# Patient Record
Sex: FEMALE | Race: WHITE | NOT HISPANIC OR LATINO | Employment: OTHER | URBAN - METROPOLITAN AREA
[De-identification: names, ages, dates, MRNs, and addresses within clinical notes are randomized per-mention and may not be internally consistent; named-entity substitution may affect disease eponyms.]

---

## 2017-02-15 ENCOUNTER — TELEPHONE (OUTPATIENT)
Dept: FAMILY MEDICINE | Facility: OTHER | Age: 57
End: 2017-02-15

## 2017-02-15 NOTE — TELEPHONE ENCOUNTER
Summary:    Patient is due/failing the following:   MAMMOGRAM    Action needed:   Schedule a mammogram     Type of outreach:    none per surgical history patient had a mastectomy     Questions for provider review:    If patient no longer needs mammograms please update surgical history with s/p double mastectomy.         History of breast cancer 10/22/2014   Overview:     stage 3, diagnosed in 2006 s/p double mastectomy, chemo and radiation                                                                                                                                  Aleena Zamora       Chart routed to Provider .        Panel Management Review      Patient has the following on her problem list:     Depression / Dysthymia review  PHQ-9 SCORE 6/2/2016 6/17/2016 12/29/2016   Total Score - - -   Total Score 1 0 3      Patient is due for:  PHQ9 and DAP      Composite cancer screening  Chart review shows that this patient is due/due soon for the following Mammogram

## 2017-06-20 ENCOUNTER — TELEPHONE (OUTPATIENT)
Dept: FAMILY MEDICINE | Facility: OTHER | Age: 57
End: 2017-06-20

## 2017-06-20 NOTE — TELEPHONE ENCOUNTER
Summary:    Patient is due/failing the following:   Depression follow up     Action needed:   Patient needs office visit for follow up.    Type of outreach:    Phone, spoke to patient.  patient scheduled OV    Questions for provider review:    Patient is still showing up due for a mammogram could the surgical history be updated with ([id number]) to exclude patient from the measure.                                                                                                                                     Aleena Zamora       Chart routed to Provider .        Panel Management Review      Patient has the following on her problem list:     Depression / Dysthymia review  PHQ-9 SCORE 6/2/2016 6/17/2016 12/29/2016   Total Score - - -   Total Score 1 0 3      Patient is due for:  PHQ9 and DAP      Composite cancer screening  Chart review shows that this patient is due/due soon for the following Mammogram

## 2017-06-29 DIAGNOSIS — G47.00 INSOMNIA, UNSPECIFIED TYPE: ICD-10-CM

## 2017-06-29 RX ORDER — ZOLPIDEM TARTRATE 5 MG/1
TABLET ORAL
Qty: 30 TABLET | Refills: 0 | Status: SHIPPED | OUTPATIENT
Start: 2017-06-29 | End: 2017-07-06

## 2017-06-29 NOTE — TELEPHONE ENCOUNTER
ambien      Last Written Prescription Date:  12/29/16  Last Fill Quantity: 30,   # refills: 5  Last Office Visit with [facility] prescribing provider: 06/23/17  Future Office visit:   MARY Gillis refill request to provider for review/approval because:  Drug not on the [facility] or [facility] refill protocol or controlled substance

## 2017-07-06 DIAGNOSIS — G47.00 INSOMNIA, UNSPECIFIED TYPE: ICD-10-CM

## 2017-07-06 NOTE — TELEPHONE ENCOUNTER
Zolpidem 5 mg    Duplicate?  Last Written Prescription Date:  6/29/2017  Last Fill Quantity: 30,   # refills: 0  Last Office Visit with [facility] or  [facility] prescribing provider: 6/23/2017  Future Office visit:       Routing refill request to provider for review/approval because:  Drug not on the [facility], [facility] or  [facility] refill protocol or controlled substance

## 2017-07-10 RX ORDER — ZOLPIDEM TARTRATE 5 MG/1
TABLET ORAL
Qty: 30 TABLET | Refills: 0 | Status: SHIPPED | OUTPATIENT
Start: 2017-07-10 | End: 2017-08-04

## 2017-07-10 NOTE — TELEPHONE ENCOUNTER
Called and spoke with patient regarding refill request.  Patient stated she would like it faxed to [facility].  Faxed at [phone].  Informed patient to call back with any other concerns or questions.  Idalia Sargent CMA ([facility])

## 2017-07-11 ENCOUNTER — OFFICE VISIT (OUTPATIENT)
Dept: NEUROLOGY | Facility: CLINIC | Age: 57
End: 2017-07-11
Attending: PSYCHIATRY & NEUROLOGY
Payer: COMMERCIAL

## 2017-07-11 VITALS
DIASTOLIC BLOOD PRESSURE: 94 MMHG | WEIGHT: 195.6 LBS | HEART RATE: 62 BPM | SYSTOLIC BLOOD PRESSURE: 145 MMHG | BODY MASS INDEX: 35.99 KG/M2 | TEMPERATURE: 97.7 F | RESPIRATION RATE: 20 BRPM | HEIGHT: 62 IN | OXYGEN SATURATION: 98 %

## 2017-07-11 DIAGNOSIS — G35 MULTIPLE SCLEROSIS (H): Primary | ICD-10-CM

## 2017-07-11 PROCEDURE — 99212 OFFICE O/P EST SF 10 MIN: CPT | Mod: ZF

## 2017-07-11 RX ORDER — TOLTERODINE 4 MG/1
4 CAPSULE, EXTENDED RELEASE ORAL DAILY
Qty: 30 CAPSULE | Refills: 3 | Status: SHIPPED | OUTPATIENT
Start: 2017-07-11 | End: 2017-08-17

## 2017-07-11 ASSESSMENT — PAIN SCALES - GENERAL: PAINLEVEL: NO PAIN (0)

## 2017-07-11 NOTE — MR AVS SNAPSHOT
After Visit Summary   7/11/2017    Shaneka Alvarez    MRN: [id number]           Patient Information     Date Of Birth          1960        Visit Information        Provider Department      7/11/2017 11:30 AM Caio Quiles MD [facility] Multiple Sclerosis        Today's Diagnoses     Multiple sclerosis (H)    -  1       Follow-ups after your visit        Follow-up notes from your care team     Return in about 1 year (around 7/11/2018), or with MRI.      Your next 10 appointments already scheduled     Jul 10, 2018 10:45 AM CDT   (Arrive by 10:30 AM)   MR BRAIN W/O & W CONTRAST with [id number]   [facility] MRI ([facility])    [address] [address] [phone]           Take your medicines as usual, unless your doctor tells you not to. Bring a list of your current medicines to your exam (including vitamins, minerals and over-the-counter drugs).  You will be given intravenous contrast for this exam. To prepare:   The day before your exam, drink extra fluids at least six 8-ounce glasses (unless your doctor tells you to restrict your fluids).   Have a blood test (creatinine test) within 30 days of your exam. Go to your clinic or Diagnostic Imaging Department for this test.  The MRI machine uses a strong magnet. Please wear clothes without metal (snaps, zippers). A sweatsuit works well, or we may give you a hospital gown.  Please remove any body piercings and hair extensions before you arrive. You will also remove watches, jewelry, hairpins, wallets, dentures, partial dental plates and hearing aids. You may wear contact lenses, and you may be able to wear your rings. We have a safe place to keep your personal items, but it is safer to leave them at home.   **IMPORTANT** THE INSTRUCTIONS BELOW ARE ONLY FOR THOSE PATIENTS WHO HAVE BEEN TOLD THEY WILL RECEIVE SEDATION OR GENERAL ANESTHESIA DURING THEIR MRI PROCEDURE:  IF YOU  WILL RECEIVE SEDATION (take medicine to help you relax during your exam):   You must get the medicine from your doctor before you arrive. Bring the medicine to the exam. Do not take it at home.   Arrive one hour early. Bring someone who can take you home after the test. Your medicine will make you sleepy. After the exam, you may not drive, take a bus or take a taxi by yourself.   No eating 8 hours before your exam. You may have clear liquids up until 4 hours before your exam. (Clear liquids include water, clear tea, black coffee and fruit juice without pulp.)  IF YOU WILL RECEIVE ANESTHESIA (be asleep for your exam):   Arrive 1 1/2 hours early. Bring someone who can take you home after the test. You may not drive, take a bus or take a taxi by yourself.   No eating 8 hours before your exam. You may have clear liquids up until 4 hours before your exam. (Clear liquids include water, clear tea, black coffee and fruit juice without pulp.)  Please call the Imaging Department at your exam site with any questions.            Jul 10, 2018 12:00 PM CDT   (Arrive by 11:45 AM)   Return Multiple Sclerosis with Caio Quiles MD   [facility] Multiple Sclerosis ([facility])    [address] [address] [phone]              Future tests that were ordered for you today     Open Future Orders        Priority Expected Expires Ordered    MR Brain w/o & w Contrast Routine 7/10/2018 7/11/2018 7/11/2017            Who to contact     If you have questions or need follow up information about today's clinic visit or your schedule please contact [facility] MULTIPLE SCLEROSIS directly at [phone].  Normal or non-critical lab and imaging results will be communicated to you by MyChart, letter or phone within 4 business days after the clinic has received the results. If you do not hear from us within 7 days, please contact the clinic through MyChart or phone. If you have a  "critical or abnormal lab result, we will notify you by phone as soon as possible.  Submit refill requests through [employer] or call your pharmacy and they will forward the refill request to us. Please allow 3 business days for your refill to be completed.          Additional Information About Your Visit        [employer] [facility] Information     [employer] gives you secure access to your electronic health record. If you see a primary care provider, you can also send messages to your care team and make appointments. If you have questions, please call your primary care clinic.  If you do not have a primary care provider, please call [phone] and they will assist you.        Care EveryWhere ID     This is your Care EveryWhere ID. This could be used by other organizations to access your [facility] medical records  [id number]        Your Vitals Were     Pulse Temperature Respirations Height Pulse Oximetry Breastfeeding?    62 97.7  F (36.5  C) (Oral) 20 1.575 m (5' 2\") 98% No    BMI (Body Mass Index)                   35.78 kg/m2            Blood Pressure from Last 3 Encounters:   07/11/17 (!) 145/94   12/29/16 124/86   08/25/16 (!) 138/100    Weight from Last 3 Encounters:   07/11/17 88.7 kg (195 lb 9.6 oz)   12/29/16 90.3 kg (199 lb)   08/25/16 87 kg (191 lb 12.8 oz)                 Today's Medication Changes          These changes are accurate as of: 7/11/17 12:07 PM.  If you have any questions, ask your nurse or doctor.               Start taking these medicines.        Dose/Directions    tolterodine 4 MG 24 hr capsule   Commonly known as:  DETROL LA   Used for:  Multiple sclerosis (H)   Started by:  Caio Quiles MD        Dose:  4 mg   Take 1 capsule (4 mg) by mouth daily   Quantity:  30 capsule   Refills:  3            Where to get your medicines      These medications were sent to [id number] - [place], [place] - [address]  AT [facility], [facility] [address]  [address], [facility] [address]     " Phone:  [phone]     tolterodine 4 MG 24 hr capsule                Primary Care Provider Office Phone # Fax #    Tennille Pena [phone]       [address] [address]        Equal Access to Services     SAJAN CARTAGENA : Najma sanford hadasho Soomaali, waaxda luqadaha, qaybta kaalmada adeegjocelinda, giselle cuenca. So [facility] [phone].    ATENCIÓN: Si habla español, tiene a jacobs disposición servicios gratuitos de asistencia lingüística. Jasson al [phone].    We comply with applicable federal civil rights laws and [place] laws. We do not discriminate on the basis of race, color, national origin, age, disability sex, sexual orientation or gender identity.            Thank you!     Thank you for choosing [facility] MULTIPLE SCLEROSIS  for your care. Our goal is always to provide you with excellent care. Hearing back from our patients is one way we can continue to improve our services. Please take a few minutes to complete the written survey that you may receive in the mail after your visit with us. Thank you!             Your Updated Medication List - Protect others around you: Learn how to safely use, store and throw away your medicines at www.disposemymeds.org.          This list is accurate as of: 7/11/17 12:07 PM.  Always use your most recent med list.                   Brand Name Dispense Instructions for use Diagnosis    busPIRone 15 MG tablet    BUSPAR    180 tablet    Take 1 tablet (15 mg) by mouth 2 times daily    Anxiety       calcium citrate-vitamin D 315-250 MG-UNIT Tabs per tablet    CITRACAL     Take by mouth daily        dihydroergotamine 1 MG/ML injection    DHE    10 mL    INJECT 1 ML INTO THE MUSCLE ONCE FOR 1 DOSE    Migraine with aura and without status migrainosus, not intractable       hydrOXYzine 25 MG tablet    ATARAX    30 tablet    Take 2-4 tablets ( mg) by mouth every 6 hours as needed for  "anxiety    Anxiety       ketorolac 60 MG/2ML Soln injection    TORADOL    12 mL    INJECT 2MLS INTO THE MUSCLE ONCE FOR 1 DOSE    Migraine without aura and without status migrainosus, not intractable, Neurogenic bladder       magnesium 250 MG tablet      Take 1 tablet by mouth daily        ONDANSETRON PO      Take 4 mg by mouth as needed    Neurogenic bladder       propranolol 60 MG 24 hr capsule    INDERAL LA    90 capsule    Take 1 capsule (60 mg) by mouth daily    Migraine without aura and without status migrainosus, not intractable       syringe/needle (disp) 23G X 1\" 3 ML Misc    B-D INTEGRA SYRINGE    25 each    1 each as needed    Migraine without aura and without status migrainosus, not intractable       tolterodine 4 MG 24 hr capsule    DETROL LA    30 capsule    Take 1 capsule (4 mg) by mouth daily    Multiple sclerosis (H)       venlafaxine 225 MG Tb24 24 hr tablet    EFFEXOR-ER    90 tablet    Take 1 tablet (225 mg) by mouth daily (with breakfast)    Anxiety       VITAMIN D3 PO      Take 2,000 Units by mouth daily        zolpidem 5 MG tablet    AMBIEN    30 tablet    TAKE 1 TABLET BY MOUTH NIGHTLY AS NEEDED FOR SLEEP    Insomnia, unspecified type         "

## 2017-07-11 NOTE — PROGRESS NOTES
"REASON FOR VISIT: Shaneka Alvarez is a 57-year-old woman with multiple sclerosis who is returning for regular followup today.     HISTORY OF PRESENT ILLNESS: Shaneka has multiple concerns today. Over the last year, she has had episodes of \"electricity\" sensations in her neck and head with associated visual changes (grey vision) and hearing changes (hearing static). These episodes last 1 minute. After she experiences nausea and occasionally vomits. She also has dizziness and unsteadiness after the episodes for approximately 10 minutes. The episodes will sometimes incite a migraine headache. She notices these episodes if she misses a dose of her Effexor, however they will infrequently happen even if she has not missed a dose. She has also had increased frequency of urinary incontinence approximately 2x per month over the last year. She says she typically gets an urge to go and will have an accident, however sometimes she has accidents without the urge. She says her thinking has been \"foggy\" and she has had 3 episodes of getting lost in familiar places over the last year. Denies new weakness, numbness, tingling, changes in vision, or changes in gait. She has been more unsteady recently but denies any falls. Has double vision when she is very tired. Has significant fatigue and decreased endurance when out in hot weather. Has muscle spasms in her lower extremities and back. She also has marked stiffness in the morning. Has infrequent episodes of stabbing pain behind her right eye over the last three years. Her migraines continue to be well managed with DHE injections.     PHYSICAL EXAMINATION:   VITALS: Blood pressure 145/94. Pulse 62. Respiratory rate 20. Weight 195 pounds.    NEUROLOGIC EXAM: She is alert and attentive. Affect is restricted. Cranial nerves are intact bilaterally. Eye movements are full with minimal pain at extremes of vision and without nystagmus. Face is symmetric and sensation is normal. Muscle bulk and " "tone are normal. Strength is diffusely reduced on right upper and lower extremities. Deep tendon reflexes are diffusely hyporeflexic. Coordination is normal to toe and finger tapping. Finger-nose-finger testing is slower on right side. Gait is stable. Marked instability with tandem gait and walking on heels and toes.     IMPRESSION: Shaneka Alvarez is a 57 year old female with multiple sclerosis, relapsing onset with benign course and no evidence of secondary progression. She has been having episodes of electric sensations in her head and neck associated visual changes, nausea, vomiting, and dizziness. There is a temporal relationship between missing doses of Effexor and these episodes. This is most likely associated with Effexor withdrawal symptoms and we discussed that she should attempt not to miss doses. We discussed Detrol, kegel exercises, and urinary schedules to manage her increased urinary incontinence. Regarding her mental \"fogginess\", we discussed the potential for neucognitive testing or OT referral if this begins to affect her ability to perform her job. We discussed the plan for continued MRI surveillance with the plan for repeat MRI in approximately 1 year. Her migraine headaches continue to be well managed with DHE injections.     PLAN:   1. Start Detrol 4mg daily for management of urinary incontinence   2. Followup in 1 year with repeat MRI at that time     I, Xochitl Robison, MS4, have scribed this note on behalf of Caio Quiles MD.      I saw and examined this patient, and have read and edited the documentation by MS4 Xochitl Robison, who acted as a scribe for me.  I agree with the findings, assessment, and plan.  Shaneka does seem to have some true right-sided weakness today, whereas before this had more of a non-physiologic quality.  I spent 25 minutes with the patient, greater than 50% in counseling and coordination of care, primarily discussing her \"head zaps\" and the likelihood that " this is related to venlafaxine, and management of neurogenic bladder.      Caio Quiles MD

## 2017-07-11 NOTE — NURSING NOTE
"Chief Complaint   Patient presents with     RECHECK     [facility]- MULTIPLE SCLEROSIS, 1 YEAR F/U       Initial BP (!) 145/94 (BP Location: Left arm, Patient Position: Sitting, Cuff Size: Adult Large)  Pulse 62  Temp 97.7  F (36.5  C) (Oral)  Resp 20  Ht 1.575 m (5' 2\")  Wt 88.7 kg (195 lb 9.6 oz)  SpO2 98%  Breastfeeding? No  BMI 35.78 kg/m2 Estimated body mass index is 35.78 kg/(m^2) as calculated from the following:    Height as of this encounter: 1.575 m (5' 2\").    Weight as of this encounter: 88.7 kg (195 lb 9.6 oz).  Medication Reconciliation: complete      Lester Lujan, CMA    "

## 2017-07-11 NOTE — LETTER
"7/11/2017      RE: Shaneka Alvarez  [address] [facility] [address]       REASON FOR VISIT: Shaneka Alvarez is a 57-year-old woman with multiple sclerosis who is returning for regular followup today.     HISTORY OF PRESENT ILLNESS: Shaneka has multiple concerns today. Over the last year, she has had episodes of \"electricity\" sensations in her neck and head with associated visual changes (grey vision) and hearing changes (hearing static). These episodes last 1 minute. After she experiences nausea and occasionally vomits. She also has dizziness and unsteadiness after the episodes for approximately 10 minutes. The episodes will sometimes incite a migraine headache. She notices these episodes if she misses a dose of her Effexor, however they will infrequently happen even if she has not missed a dose. She has also had increased frequency of urinary incontinence approximately 2x per month over the last year. She says she typically gets an urge to go and will have an accident, however sometimes she has accidents without the urge. She says her thinking has been \"foggy\" and she has had 3 episodes of getting lost in familiar places over the last year. Denies new weakness, numbness, tingling, changes in vision, or changes in gait. She has been more unsteady recently but denies any falls. Has double vision when she is very tired. Has significant fatigue and decreased endurance when out in hot weather. Has muscle spasms in her lower extremities and back. She also has marked stiffness in the morning. Has infrequent episodes of stabbing pain behind her right eye over the last three years. Her migraines continue to be well managed with DHE injections.     PHYSICAL EXAMINATION:   VITALS: Blood pressure 145/94. Pulse 62. Respiratory rate 20. Weight 195 pounds.    NEUROLOGIC EXAM: She is alert and attentive. Affect is restricted. Cranial nerves are intact bilaterally. Eye movements are full with minimal pain at extremes of vision " "and without nystagmus. Face is symmetric and sensation is normal. Muscle bulk and tone are normal. Strength is diffusely reduced on right upper and lower extremities. Deep tendon reflexes are diffusely hyporeflexic. Coordination is normal to toe and finger tapping. Finger-nose-finger testing is slower on right side. Gait is stable. Marked instability with tandem gait and walking on heels and toes.     IMPRESSION: Shaneka Alvarez is a 57 year old female with multiple sclerosis, relapsing onset with benign course and no evidence of secondary progression. She has been having episodes of electric sensations in her head and neck associated visual changes, nausea, vomiting, and dizziness. There is a temporal relationship between missing doses of Effexor and these episodes. This is most likely associated with Effexor withdrawal symptoms and we discussed that she should attempt not to miss doses. We discussed Detrol, kegel exercises, and urinary schedules to manage her increased urinary incontinence. Regarding her mental \"fogginess\", we discussed the potential for neucognitive testing or OT referral if this begins to affect her ability to perform her job. We discussed the plan for continued MRI surveillance with the plan for repeat MRI in approximately 1 year. Her migraine headaches continue to be well managed with DHE injections.     PLAN:   1. Start Detrol 4mg daily for management of urinary incontinence   2. Followup in 1 year with repeat MRI at that time     I, Xochitl Robison, MS4, have scribed this note on behalf of Caio Quiles MD.      I saw and examined this patient, and have read and edited the documentation by MS4 Xochitl Robison, who acted as a scribe for me.  I agree with the findings, assessment, and plan.  Shaneka does seem to have some true right-sided weakness today, whereas before this had more of a non-physiologic quality.  I spent 25 minutes with the patient, greater than 50% in counseling and " "coordination of care, primarily discussing her \"head zaps\" and the likelihood that this is related to venlafaxine, and management of neurogenic bladder.      Caio Quiles MD    "

## 2017-07-17 DIAGNOSIS — G43.009 MIGRAINE WITHOUT AURA AND WITHOUT STATUS MIGRAINOSUS, NOT INTRACTABLE: ICD-10-CM

## 2017-07-17 DIAGNOSIS — F41.9 ANXIETY: ICD-10-CM

## 2017-07-19 NOTE — TELEPHONE ENCOUNTER
Propranolol      Last Written Prescription Date: 6/17/16  Last Fill Quantity: 90, # refills: 3  Last Office Visit with [facility] or [facility] prescribing provider: 6/23/17       BP Readings from Last 3 Encounters:   07/11/17 (!) 145/94   12/29/16 124/86   08/25/16 (!) 138/100     Venlafaxine     Last Written Prescription Date: 6/17/16  Last Fill Quantity: 90, # refills: 3  Last Office Visit with [facility] primary care provider:  6/23/17        Last PHQ-9 score on record=   PHQ-9 SCORE 12/29/2016   Total Score -   Total Score 3

## 2017-07-20 ENCOUNTER — TELEPHONE (OUTPATIENT)
Dept: FAMILY MEDICINE | Facility: OTHER | Age: 57
End: 2017-07-20

## 2017-07-20 RX ORDER — VENLAFAXINE HYDROCHLORIDE 225 MG/1
TABLET, EXTENDED RELEASE ORAL
Qty: 30 TABLET | Refills: 0 | Status: SHIPPED | OUTPATIENT
Start: 2017-07-20 | End: 2017-09-15

## 2017-07-20 RX ORDER — PROPRANOLOL HCL 60 MG
CAPSULE, EXTENDED RELEASE 24HR ORAL
Qty: 90 CAPSULE | Refills: 1 | Status: SHIPPED | OUTPATIENT
Start: 2017-07-20 | End: 2020-12-15

## 2017-07-20 NOTE — TELEPHONE ENCOUNTER
Reason for Call:  Medication or medication refill:    Do you use a [facility]?  Name of the pharmacy and phone number for the current request:  Dont know because we got disconnected.     Name of the medication requested: propranolol (INDERAL LA) 60 MG 24 hr capsule, venlafaxine (EFFEXOR-ER) 225 MG TB24 24 hr tablet    Other request: Incoming call from Patient who is extremely upset with the process of how medication is being done, she stated that she is out of her medication and having withdrawals and she stated this happens every time she has to refill her medication. Having phone issuses and got disconnected with the patient. I was going to transfer her to a manager.   Can we leave a detailed message on this number? YES    Phone number patient can be reached at: Home number on file [phone] (home)    Best Time: Anytime     Call taken on 7/20/2017 at 11:34 AM by Fawn Edwards

## 2017-07-20 NOTE — TELEPHONE ENCOUNTER
Spoke to patient and informed her these script were sent to her pharmacy. I asked her if she still needing to talk to a manager an she said. No after talking with me and having me explain our process she didn't nee anyone to call her she understood,

## 2017-07-20 NOTE — TELEPHONE ENCOUNTER
LOV: 12/29/16    Inderal:  Prescription approved per [facility] Refill Protocol.  Effexor:  Medication is being filled for 1 time refill only due to:  Patient needs to be seen because due for mood follow up and PHQ-9.    Creatinine   Date Value Ref Range Status   12/29/2016 0.82 0.52 - 1.04 mg/dL Final     Genny Garcia, RN, BSN

## 2017-08-04 DIAGNOSIS — G47.00 INSOMNIA, UNSPECIFIED TYPE: ICD-10-CM

## 2017-08-07 RX ORDER — ZOLPIDEM TARTRATE 5 MG/1
TABLET ORAL
Qty: 30 TABLET | Refills: 0 | Status: SHIPPED | OUTPATIENT
Start: 2017-08-07 | End: 2019-07-16

## 2017-08-07 NOTE — TELEPHONE ENCOUNTER
Zolpidem 5 mg      Last Written Prescription Date:  7/10/2017  Last Fill Quantity: 30,   # refills: 0  [place] [facility] [place] [facility] or  Health prescribing provider: 6/23/2017  Future Office visit:       Routing refill request to provider for review/approval because:  Drug not on the [facility] [place] or  [facility] refill protocol or controlled substance

## 2017-08-17 DIAGNOSIS — G35 MULTIPLE SCLEROSIS (H): ICD-10-CM

## 2017-08-17 RX ORDER — TOLTERODINE 4 MG/1
4 CAPSULE, EXTENDED RELEASE ORAL DAILY
Qty: 90 CAPSULE | Refills: 3 | Status: SHIPPED | OUTPATIENT
Start: 2017-08-17 | End: 2019-07-16

## 2017-08-17 NOTE — TELEPHONE ENCOUNTER
We received a fax from [facility] stating that patient would like a 90-day supply of her tolterodine; This has been sent per [place] refill protocol.    Maggie Francois MS RN Care Coordinator

## 2017-09-15 ENCOUNTER — MYC REFILL (OUTPATIENT)
Dept: FAMILY MEDICINE | Facility: OTHER | Age: 57
End: 2017-09-15

## 2017-09-15 ENCOUNTER — TELEPHONE (OUTPATIENT)
Dept: FAMILY MEDICINE | Facility: OTHER | Age: 57
End: 2017-09-15

## 2017-09-15 DIAGNOSIS — F41.9 ANXIETY: ICD-10-CM

## 2017-09-15 DIAGNOSIS — G43.009 MIGRAINE WITHOUT AURA AND WITHOUT STATUS MIGRAINOSUS, NOT INTRACTABLE: ICD-10-CM

## 2017-09-15 DIAGNOSIS — N31.9 NEUROGENIC BLADDER: ICD-10-CM

## 2017-09-15 DIAGNOSIS — G43.109 MIGRAINE WITH AURA AND WITHOUT STATUS MIGRAINOSUS, NOT INTRACTABLE: ICD-10-CM

## 2017-09-15 DIAGNOSIS — G47.00 INSOMNIA, UNSPECIFIED TYPE: ICD-10-CM

## 2017-09-15 RX ORDER — KETOROLAC TROMETHAMINE 30 MG/ML
INJECTION, SOLUTION INTRAMUSCULAR; INTRAVENOUS
Qty: 2 ML | Refills: 0 | Status: ON HOLD | OUTPATIENT
Start: 2017-09-15 | End: 2019-09-06

## 2017-09-15 RX ORDER — VENLAFAXINE HYDROCHLORIDE 225 MG/1
225 TABLET, EXTENDED RELEASE ORAL
Qty: 30 TABLET | Refills: 0 | Status: CANCELLED | OUTPATIENT
Start: 2017-09-15

## 2017-09-15 RX ORDER — ZOLPIDEM TARTRATE 5 MG/1
TABLET ORAL
Qty: 30 TABLET | Refills: 0 | Status: CANCELLED | OUTPATIENT
Start: 2017-09-15

## 2017-09-15 RX ORDER — DIHYDROERGOTAMINE MESYLATE 1 MG/ML
1 INJECTION, SOLUTION INTRAMUSCULAR; INTRAVENOUS; SUBCUTANEOUS ONCE
Qty: 2 ML | Refills: 0 | Status: CANCELLED | OUTPATIENT
Start: 2017-09-15 | End: 2017-09-15

## 2017-09-15 RX ORDER — KETOROLAC TROMETHAMINE 30 MG/ML
INJECTION, SOLUTION INTRAMUSCULAR; INTRAVENOUS
Qty: 2 ML | Refills: 0 | Status: CANCELLED | OUTPATIENT
Start: 2017-09-15

## 2017-09-15 RX ORDER — DIHYDROERGOTAMINE MESYLATE 1 MG/ML
1 INJECTION, SOLUTION INTRAMUSCULAR; INTRAVENOUS; SUBCUTANEOUS ONCE
Qty: 2 ML | Refills: 0 | Status: SHIPPED | OUTPATIENT
Start: 2017-09-15 | End: 2017-09-15

## 2017-09-15 RX ORDER — VENLAFAXINE HYDROCHLORIDE 225 MG/1
225 TABLET, EXTENDED RELEASE ORAL
Qty: 30 TABLET | Refills: 0 | Status: SHIPPED | OUTPATIENT
Start: 2017-09-15 | End: 2017-10-16

## 2017-09-15 NOTE — TELEPHONE ENCOUNTER
Pt calling again. She is needing- Venlafaxine, Zolpidem, Ketorolac and DHE.   Thank you,  Elizabeth Chaney- Pt Rep.

## 2017-09-15 NOTE — TELEPHONE ENCOUNTER
"Patient called back. She said she needs \"all of them\" she \"cannot remember all the names\" she said we must know what they are and she never came to the appt yesterday due to a emergency ,  "

## 2017-09-15 NOTE — TELEPHONE ENCOUNTER
Left message for pt to call back to clinic.  Unsure which medication pt is needing.  Had OV with RK yesterday.  Tracy Terry CMA

## 2017-09-15 NOTE — TELEPHONE ENCOUNTER
Requested Provider:  Tiera Pena MD    PCP: Tennille Pena    Reason for visit: Is our of her medications. She said she can only only be seen today.     Duration of symptoms:     Have you been treated for this in the past? No    Additional comments: I offered patient a appt on Monday and she said if she runs out of the medication she will be throwing up all weekend.

## 2017-09-18 RX ORDER — VENLAFAXINE HYDROCHLORIDE 225 MG/1
TABLET, EXTENDED RELEASE ORAL
Qty: 90 TABLET | Refills: 0 | OUTPATIENT
Start: 2017-09-18

## 2017-09-18 NOTE — TELEPHONE ENCOUNTER
Message from MyChart:  Original authorizing provider: Tennille Pena MD    Shaneka Arrietas would like a refill of the following medications:  zolpidem (AMBIEN) 5 MG tablet [Tennille Pena MD]  ketorolac (TORADOL) 60 MG/2ML SOLN injection [Tennille Pena MD]  dihydroergotamine (DHE) 1 MG/ML injection [Tennille Pena MD]  venlafaxine (EFFEXOR-ER) 225 MG TB24 24 hr tablet [Tennille Pena MD]    Preferred pharmacy: [facility] [address] [place] [id number] [address] AT [facility] OF  & MAIN    Comment:

## 2017-09-18 NOTE — TELEPHONE ENCOUNTER
Left message for patient to return call. Please see RK message below and help patient schedule appointment.

## 2017-09-18 NOTE — TELEPHONE ENCOUNTER
venlafaxine (EFFEXOR-ER) 225 MG TB24 24 hr tablet    Last Written Prescription Date: 9/15/17  Last Fill Quantity: 30, # refills: 0  Last Office Visit with [facility], [facility] or [facility] prescribing provider: 9/14/17 Jean        BP Readings from Last 3 Encounters:   07/11/17 (!) 145/94   12/29/16 124/86   08/25/16 (!) 138/100     Pulse: (for Fetzima)  Creatinine   Date Value Ref Range Status   12/29/2016 0.82 0.52 - 1.04 mg/dL Final   ]    Last PHQ-9 score on record=   PHQ-9 SCORE 12/29/2016   Total Score -   Total Score 3

## 2017-10-16 ENCOUNTER — TELEPHONE (OUTPATIENT)
Dept: FAMILY MEDICINE | Facility: OTHER | Age: 57
End: 2017-10-16

## 2017-10-16 DIAGNOSIS — F41.9 ANXIETY: ICD-10-CM

## 2017-10-16 NOTE — TELEPHONE ENCOUNTER
venlafaxine (EFFEXOR-ER) 225 MG TB24 24 hr tablet     Last Written Prescription Date: 09/15/17  Last Fill Quantity: 30, # refills: 0  Last Office Visit with [facility] [place] [place] primary care provider:  06/23/17        Last PHQ-9 score on record=   PHQ-9 SCORE 12/29/2016   Total Score -   Total Score 3

## 2017-10-18 RX ORDER — VENLAFAXINE HYDROCHLORIDE 225 MG/1
TABLET, EXTENDED RELEASE ORAL
Qty: 15 TABLET | Refills: 0 | Status: SHIPPED | OUTPATIENT
Start: 2017-10-18 | End: 2020-12-15

## 2017-10-18 NOTE — TELEPHONE ENCOUNTER
Routing refill request to provider for review/approval because:  Monalisa given x1 and patient did not follow up, please advise    Song Hood, RN, BSN

## 2017-10-18 NOTE — TELEPHONE ENCOUNTER
Refill given for short course as she is due for ov with RK she was given rachel month last refill in September   Thank you  Delphine Marinelli CNP

## 2017-10-19 NOTE — TELEPHONE ENCOUNTER
Left message for patient to call back. Please see message below and help them schedule an appointment.  Akanksha Hernandez, CMA

## 2017-11-26 DIAGNOSIS — G43.009 MIGRAINE WITHOUT AURA AND WITHOUT STATUS MIGRAINOSUS, NOT INTRACTABLE: ICD-10-CM

## 2017-11-27 RX ORDER — PROPRANOLOL HCL 60 MG
CAPSULE, EXTENDED RELEASE 24HR ORAL
Qty: 90 CAPSULE | Refills: 0 | OUTPATIENT
Start: 2017-11-27

## 2018-01-07 ENCOUNTER — HEALTH MAINTENANCE LETTER (OUTPATIENT)
Age: 58
End: 2018-01-07

## 2018-01-31 ENCOUNTER — TELEPHONE (OUTPATIENT)
Dept: FAMILY MEDICINE | Facility: OTHER | Age: 58
End: 2018-01-31

## 2018-01-31 DIAGNOSIS — F41.9 ANXIETY: ICD-10-CM

## 2018-02-02 DIAGNOSIS — F41.9 ANXIETY: ICD-10-CM

## 2018-02-02 RX ORDER — BUSPIRONE HYDROCHLORIDE 15 MG/1
TABLET ORAL
Qty: 180 TABLET | Refills: 0 | OUTPATIENT
Start: 2018-02-02

## 2018-02-02 NOTE — TELEPHONE ENCOUNTER
Left message for patient to call back. Please see message below and help schedule appointment.  Akanksha Hernandez, CMA

## 2018-02-02 NOTE — TELEPHONE ENCOUNTER
"Requested Prescriptions   Pending Prescriptions Disp Refills     busPIRone (BUSPAR) 15 MG tablet [Pharmacy Med Name: BUSPIRONE 15MG TABLETS] 180 tablet 0     Sig: TAKE 1 TABLET(15 MG) BY MOUTH TWICE DAILY    Atypical Antidepressants Protocol Failed    1/31/2018  7:46 PM       Failed - Patient has PHQ-9 score less than 5 in past 6 months.    The PHQ-9 criteria is meant to fail. It requires a PHQ-9 score review         Failed - Recent or future visit with authorizing provider's specialty    Patient had office visit in the last year or has a visit in the next 30 days with authorizing provider.  See \"Patient Info\" tab in inbasket, or \"Choose Columns\" in Meds & Orders section of the refill encounter.            Failed - Recent (6 mo) or future visit with authorizing provider's specialty    Patient had office visit in the last 6 months or has a visit in the next 30 days with authorizing provider.  See \"Patient Info\" tab in inbasket, or \"Choose Columns\" in Meds & Orders section of the refill encounter.           Passed - Patient is age 18 or older       Passed - No active pregnancy on record       Passed - No positive pregnancy test in past 12 mos        Routing refill request to provider for review/approval because:  Patient needs to be seen because it has been more than 1 year since last office visit.    Rafia Peña RN      "

## 2018-02-06 RX ORDER — BUSPIRONE HYDROCHLORIDE 15 MG/1
TABLET ORAL
Qty: 180 TABLET | Refills: 0 | OUTPATIENT
Start: 2018-02-06

## 2018-02-06 NOTE — TELEPHONE ENCOUNTER
Buspar    PHQ-9 score:    PHQ-9 SCORE 12/29/2016   Total Score -   Total Score 3     Routing refill request to provider for review/approval because:  Labs not current:  PHQ9  A break in medication    Kathy Farias RN, BSN

## 2018-07-10 ENCOUNTER — RADIANT APPOINTMENT (OUTPATIENT)
Dept: MRI IMAGING | Facility: CLINIC | Age: 58
End: 2018-07-10
Attending: PSYCHIATRY & NEUROLOGY
Payer: COMMERCIAL

## 2018-07-10 ENCOUNTER — OFFICE VISIT (OUTPATIENT)
Dept: NEUROLOGY | Facility: CLINIC | Age: 58
End: 2018-07-10
Attending: PSYCHIATRY & NEUROLOGY
Payer: COMMERCIAL

## 2018-07-10 VITALS
DIASTOLIC BLOOD PRESSURE: 102 MMHG | RESPIRATION RATE: 20 BRPM | HEIGHT: 62 IN | WEIGHT: 173.8 LBS | SYSTOLIC BLOOD PRESSURE: 145 MMHG | HEART RATE: 98 BPM | OXYGEN SATURATION: 97 % | BODY MASS INDEX: 31.98 KG/M2

## 2018-07-10 DIAGNOSIS — G35 MULTIPLE SCLEROSIS (H): ICD-10-CM

## 2018-07-10 DIAGNOSIS — G43.109 MIGRAINE WITH AURA AND WITHOUT STATUS MIGRAINOSUS, NOT INTRACTABLE: ICD-10-CM

## 2018-07-10 DIAGNOSIS — G35 MS (MULTIPLE SCLEROSIS) (H): Primary | ICD-10-CM

## 2018-07-10 PROCEDURE — G0463 HOSPITAL OUTPT CLINIC VISIT: HCPCS | Mod: ZF

## 2018-07-10 RX ORDER — GADOBUTROL 604.72 MG/ML
10 INJECTION INTRAVENOUS ONCE
Status: COMPLETED | OUTPATIENT
Start: 2018-07-10 | End: 2018-07-10

## 2018-07-10 RX ORDER — HEPARIN SODIUM (PORCINE) LOCK FLUSH IV SOLN 100 UNIT/ML 100 UNIT/ML
500 SOLUTION INTRAVENOUS ONCE
Status: COMPLETED | OUTPATIENT
Start: 2018-07-10 | End: 2018-07-10

## 2018-07-10 RX ADMIN — GADOBUTROL 10 ML: 604.72 INJECTION INTRAVENOUS at 10:48

## 2018-07-10 RX ADMIN — HEPARIN SODIUM (PORCINE) LOCK FLUSH IV SOLN 100 UNIT/ML 500 UNITS: 100 SOLUTION at 11:51

## 2018-07-10 ASSESSMENT — PAIN SCALES - GENERAL: PAINLEVEL: MODERATE PAIN (4)

## 2018-07-10 NOTE — PROGRESS NOTES
"MS CLINIC FOLLOW-UP    Shaneka Alvarez  MRN [id number]  58 year old         Subjective:   Shaneka Alvarez is a 58-year-old woman with MS not on disease-modulating therapy. She has had increasing episodes of vertigo and \"brain zaps,\" either or both occurring about twice a week. Both typically accompanied by or result in headache. She continues to take prophylaxis (propranolol) and abortive (DHE injections, 2-3 times a week with variable effect) for migraines. The brain zaps have occurred more frequently in the past when she would miss doses of Effexor; now they occur even when she takes Effexor. They feel to her as if they start in her head and travel to her neck. Sometimes trigger vertigo and headache. Yesterday, she had vertigo all day, accompanied by nausea, double vision, headache, sensation of \"water deep in the ear,\" worsened by position. She has not yet seen a headache specialist. Concerning MS symptoms, she continues to have \"iffy\" balance, no recent falls. No change in right sided weakness or tingling in hands and feet. Urinary urgency is better with Detrol. Has cramps in feet about every other week. Energy has been ok; she is able to garden and sew (works as a , so off during summer). Mood good.          Physical Exam:   BP (!) 145/102 (BP Location: Left arm, Patient Position: Sitting, Cuff Size: Adult Large)  Pulse 98  Resp 20  Ht 1.575 m (5' 2\")  Wt 78.8 kg (173 lb 12.8 oz)  SpO2 97%  BMI 31.79 kg/m2    Mental Status: Awake, alert. Right-handed.  CN II-XII intact  Motor: Normal bulk and tone. Strong in proximal and distal muscles, right slightly weaker than left.  Coordination: Normal FTN, finger- and toe-tapping.  Reflexes: 2+ and symmetric.  Sensation: Intact for LT. Romberg negative, slight swaying.  Gait: Normal base and posture with symmetrical arm swing and one-step turn. Heel, toe walking intact, less stable with tandem walk.         Data:   MRI today stable from previous, " "5-10 lesions.         Assessment and Plan:   Shaneka Alvarez is a 58-year-old woman with MS not on disease-modulating therapy who has been having headaches and vertigo on prophylactic and abortive therapy and shock-like sensations on Effexor. Stable MS symptoms, exam, and MRI. While relatively unlikely to develop further relapses, recommend serial MRIs for 2-3 more years with annual follow-up to ensure no relapses or progression. Discussed discontinuing venlafaxine, which she will consider. Referral to Thea Mixon CNP for headache management.      CC  Copy to patient  Shaneka Alvarez    I, Giselle Gan, MS4, am acting as a scribe for Dr. Quiles.     I saw and examined this patient, and have read and edited the documentation by MS4 Giselle Gan, who acted as a scribe for me.  I agree with the findings, assessment, and plan.  I spent 25 minutes with the patient, greater than 50% in counseling and coordination of care and reviewing the MRI.  Her MS is stable.  She has ongoing \"head zaps\", likely related to venlafaxine.  We discussed that the only way to get rid of these is probably to stop the venlafaxine, and that in doing so, they would likely get worse before they got better.  Migraines remain an issue - will refer her to Ms. Mixon, as migraine management has evolved significantly in recent years and she is much more up-to-date than I in that regard.    Caio Quiles MD    "

## 2018-07-10 NOTE — DISCHARGE INSTRUCTIONS
MRI Contrast Discharge Instructions    The IV contrast you received today will pass out of your body in your  urine. This will happen in the next 24 hours. You will not feel this process.  Your urine will not change color.    Drink at least 4 extra glasses of water or juice today (unless your doctor  has restricted your fluids). This reduces the stress on your kidneys.  You may take your regular medicines.    If you are on dialysis: It is best to have dialysis today.    If you have a reaction: Most reactions happen right away. If you have  any new symptoms after leaving the hospital (such as hives or swelling),  call your hospital at the correct number below. Or call your family doctor.  If you have breathing distress or wheezing, call 911.    Special instructions: ***    I have read and understand the above information.    Signature:______________________________________ Date:___________    Staff:__________________________________________ Date:___________     Time:__________    [facility] Departments:    ___[place]: [phone]  ___[facility]: [phone]  ___[place]: [phone] ___[facility]: [phone]  ___[facility]: [phone]  ___[facility]: [phone]  ___[phone]  ___[facility]: [phone]  ___[phone]

## 2018-07-10 NOTE — MR AVS SNAPSHOT
After Visit Summary   7/10/2018    Shaneka Alvarez    MRN: [id number]           Patient Information     Date Of Birth          1960        Visit Information        Provider Department      7/10/2018 12:00 PM Caio Quiles MD [facility] Multiple Sclerosis         Follow-ups after your visit        Follow-up notes from your care team     Return in about 1 year (around 7/10/2019).      Your next 10 appointments already scheduled     Jul 11, 2018 12:00 PM CDT   [facility]    [id number] [place] [id number]   [phone]            Jul 24, 2018 10:00 AM CDT   (Arrive by 9:45 AM)   New Patient Visit with GUTIERREZ Dunn [facility] Neurology ([facility])    [address] [id number] [phone]              Who to contact     If you have questions or need follow up information about today's clinic visit or your schedule please contact [facility] MULTIPLE SCLEROSIS directly at [phone].  Normal or non-critical lab and imaging results will be communicated to you by MyChart, letter or phone within 4 business days after the clinic has received the results. If you do not hear from us within 7 days, please contact the clinic through MyChart or phone. If you have a critical or abnormal lab result, we will notify you by phone as soon as possible.  Submit refill requests through [facility] or call your pharmacy and they will forward the refill request to us. Please allow 3 business days for your refill to be completed.          Additional Information About Your Visit        MyChart Information     [facility] gives you secure access to your electronic health record. If you see a primary care provider, you can also send messages to your care team and make appointments. If you have questions, please call your primary care  "clinic.  If you do not have a primary care provider, please call [phone] and they will assist you.        Care EveryWhere ID     This is your Care EveryWhere ID. This could be used by other organizations to access your [facility] medical records  [id number]        Your Vitals Were     Pulse Respirations Height Pulse Oximetry BMI (Body Mass Index)       98 20 1.575 m (5' 2\") 97% 31.79 kg/m2        Blood Pressure from Last 3 Encounters:   07/10/18 (!) 145/102   07/11/17 (!) 145/94   12/29/16 124/86    Weight from Last 3 Encounters:   07/10/18 78.8 kg (173 lb 12.8 oz)   07/11/17 88.7 kg (195 lb 9.6 oz)   12/29/16 90.3 kg (199 lb)              Today, you had the following     No orders found for display       Primary Care Provider Fax #    Physician No Ref-Primary [phone]       No address on file        Equal Access to Services     SAJAN CARTAGENA : Hadii aad ku hadasho Soomaali, waaxda luqadaha, qaybta kaalmada adeegyada, waxay maribel vora . So [facility] [phone].    ATENCIÓN: Si habla español, tiene a jacobs disposición servicios gratuitos de asistencia lingüística. Llame al [phone].    We comply with applicable federal civil rights laws and [place] laws. We do not discriminate on the basis of race, color, national origin, age, disability, sex, sexual orientation, or gender identity.            Thank you!     Thank you for choosing [facility] MULTIPLE SCLEROSIS  for your care. Our goal is always to provide you with excellent care. Hearing back from our patients is one way we can continue to improve our services. Please take a few minutes to complete the written survey that you may receive in the mail after your visit with us. Thank you!             Your Updated Medication List - Protect others around you: Learn how to safely use, store and throw away your medicines at www.disposemymeds.org.          This list is accurate as of 7/10/18  1:07 PM.  Always use your most recent med list.       " "            Brand Name Dispense Instructions for use Diagnosis    busPIRone 15 MG tablet    BUSPAR    180 tablet    Take 1 tablet (15 mg) by mouth 2 times daily    Anxiety       calcium citrate-vitamin D 315-250 MG-UNIT Tabs per tablet    CITRACAL     Take by mouth daily        hydrOXYzine 25 MG tablet    ATARAX    30 tablet    Take 2-4 tablets ( mg) by mouth every 6 hours as needed for anxiety    Anxiety       ketorolac 60 MG/2ML Soln injection    TORADOL    2 mL    INJECT 2MLS INTO THE MUSCLE ONCE FOR 1 DOSE    Migraine without aura and without status migrainosus, not intractable, Neurogenic bladder       magnesium 250 MG tablet      Take 1 tablet by mouth daily        ONDANSETRON PO      Take 4 mg by mouth as needed    Neurogenic bladder       propranolol 60 MG 24 hr capsule    INDERAL LA    90 capsule    TAKE 1 CAPSULE(60 MG) BY MOUTH DAILY    Migraine without aura and without status migrainosus, not intractable       syringe/needle (disp) 23G X 1\" 3 ML Misc    B-D INTEGRA SYRINGE    25 each    1 each as needed    Migraine without aura and without status migrainosus, not intractable       tolterodine 4 MG 24 hr capsule    DETROL LA    90 capsule    Take 1 capsule (4 mg) by mouth daily    Multiple sclerosis (H)       venlafaxine 225 MG Tb24 24 hr tablet    EFFEXOR-ER    15 tablet    TAKE 1 TABLET(225 MG) BY MOUTH DAILY WITH BREAKFAST    Anxiety       VITAMIN D3 PO      Take 2,000 Units by mouth daily        zolpidem 5 MG tablet    AMBIEN    30 tablet    TAKE 1 TABLET BY MOUTH AT BEDTIME AS NEEDED FOR SLEEP    Insomnia, unspecified type         "

## 2018-07-10 NOTE — NURSING NOTE
"Chief Complaint   Patient presents with     RECHECK     [facility] RETURN - MULTIPLE SCLEROSIS       Initial BP (!) 145/102 (BP Location: Left arm, Patient Position: Sitting, Cuff Size: Adult Large)  Pulse 98  Resp 20  Ht 1.575 m (5' 2\")  Wt 78.8 kg (173 lb 12.8 oz)  SpO2 97%  BMI 31.79 kg/m2 Estimated body mass index is 31.79 kg/(m^2) as calculated from the following:    Height as of this encounter: 1.575 m (5' 2\").    Weight as of this encounter: 78.8 kg (173 lb 12.8 oz)..  BP completed using cuff size: large  Medications Reconciled: Yes  Daysi Frias, ROBERT  11:57 AM          "

## 2018-07-11 ENCOUNTER — ONCOLOGY VISIT (OUTPATIENT)
Dept: ONCOLOGY | Facility: CLINIC | Age: 58
End: 2018-07-11
Payer: COMMERCIAL

## 2018-07-11 VITALS
SYSTOLIC BLOOD PRESSURE: 128 MMHG | TEMPERATURE: 97.2 F | RESPIRATION RATE: 16 BRPM | HEIGHT: 62 IN | BODY MASS INDEX: 31.83 KG/M2 | WEIGHT: 173 LBS | DIASTOLIC BLOOD PRESSURE: 96 MMHG | OXYGEN SATURATION: 97 % | HEART RATE: 87 BPM

## 2018-07-11 DIAGNOSIS — Z17.0 MALIGNANT NEOPLASM OF RIGHT BREAST IN FEMALE, ESTROGEN RECEPTOR POSITIVE, UNSPECIFIED SITE OF BREAST (H): Primary | ICD-10-CM

## 2018-07-11 DIAGNOSIS — C50.911 MALIGNANT NEOPLASM OF RIGHT BREAST IN FEMALE, ESTROGEN RECEPTOR POSITIVE, UNSPECIFIED SITE OF BREAST (H): Primary | ICD-10-CM

## 2018-07-11 PROCEDURE — 99214 OFFICE O/P EST MOD 30 MIN: CPT | Performed by: INTERNAL MEDICINE

## 2018-07-11 ASSESSMENT — PAIN SCALES - GENERAL: PAINLEVEL: MILD PAIN (3)

## 2018-07-11 NOTE — LETTER
7/11/2018         RE: Shaneka Alvarez  [address] [facility] [address]        Dear Colleague,    Thank you for referring your patient, Shaneka Alvarez, to the [facility]. Please see a copy of my visit note below.    Visit Date:   07/11/2018      ONCOLOGY DIAGNOSIS:    1. January 2006:  Diagnosed with Stage IIB, T2 N1 M0 invasive lobular carcinoma of the right breast.  Final pathology showed a 4.5 x 3.8 x 2.5 cm, 01/14 lymph nodes positive.  Estrogen, progesterone receptor positive, HER-2 negative.     2.  Genetics.  BRCA1 and 2 mutations not detected. 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       THERAPY TO DATE:   1. 2006:  ECOG 2104 protocol of dose-dense Adriamycin, Cytoxan and Avastin x4 cycles followed by Taxol and Avastin x4 cycles.   2.  03/2007:  Completed 1 year Avastin.   3.  11/2006-01/2007:  Radiotherapy to the right breast of 5040 cGy.   4.  [id number]/[id number]:  Aromasin.  Stopped after moving to the [facility].   5.  01/2016:  Right modified radical mastectomy with latissimus dorsi flap reconstruction and a left prophylactic mastectomy with latissimus dorsi flap reconstruction.      INTERVAL HISTORY:  Shaneka is a 58-year-old female diagnosed with stage IIB, T2 N1 M0 invasive lobular carcinoma of the right breast in 2006 after self-palpating a right breast mass.  The patient presents to clinic for followup of her malignancies.  The patient has not been seen in our clinic since 08/2016.  When she was last seen, she had come in, reporting lumpy areas in her breast.  We had referred her to plastic surgery who she did not see; however, she did follow with dermatology.  On today's visit, the patient stated that she decided to come in since she thought it was probably about time.  Does report continued lumpiness in her breast.  States that she did not see a plastic surgeon only saw a regular surgeon 2 years ago.  At that time, her surgeon had commented why her port was still in  place and it probably should be removed.  The patient is followed by neurology for her MS.  Does report having headaches followed by primary and Neurology.  Uses Ambien to help her sleep.  No fevers, chills, chest pain, shortness of breath, cough.  No new palpable masses and the remaining comprehensive review of systems is negative.      PAST MEDICAL HISTORY: Per patient no new medical history.   1.  Breast cancer, see above.   2.  Multiple sclerosis.   3.  Depression.   4.  Migraines.   5.  Hypertension.      SOCIAL HISTORY:  The patient comes in alone.  Has 3 biologic children.  No current tobacco use.      FAMILY HISTORY:  Per my note 08/2017.  Not addressed at today's visit.      PHYSICAL EXAMINATION:   VITAL SIGNS:  Blood pressure 128/96, pulse 87, respirations 16, temperature 97.2, pulse oximetry 97% on room air, weight 173 pounds, BMI 31.64.   GENERAL:  Comfortable, in no acute distress.   HEENT:  Atraumatic, normocephalic.  Pupils equal, round, reactive.  Anicteric.  Oropharynx moist mucous membranes.  No lesions, ulcers or exudate.   NECK:  Supple, full range of motion.  Trachea midline.   HEART:  Regular rate and rhythm, normal S1, S2, no murmurs, gallop.  No edema.   LUNGS:  Clear to auscultation bilateral crackles or wheezes.  Normal respiratory effort.   ABDOMEN:  Positive bowel sounds.  Soft, nontender, nondistended.  No hepatosplenomegaly.   EXTREMITIES:  No cyanosis.   MUSCULOSKELETAL:  No flank pain or point tenderness.   LYMPHATICS:  No supraclavicular, cervical or axillary nodes palpable.   SKIN:  No petechiae or rashes.   NEUROLOGIC:  Alert and oriented.   BREASTS:  Exam in the upright supine position.  Bilateral reconstructed.  No dominant mass over the anterior chest wall.   PSYCH: Upset, agitated.     IMAGING:  CT of the chest, abdomen and pelvis from 08/24/2016, no evidence of metastatic disease within chest, abdomen or pelvis.  Nonobstructing renal stone inferior pole of the left kidney  measures up to 3 mm.  Diverticulosis without diverticulitis.      ASSESSMENT AND PLAN:   1.  History of stage IIB, T2 N1 M0 invasive lobular carcinoma of the right breast.  No evidence of recurrence by history, physical.  Patient's last CT 08/2016 did not show any evidence of recurrence and her last CA 27-29 was 08/2016, which was within normal limits.  I discussed at length with the patient regarding followup.  She is now nearly 12-1/2 years out from the time of diagnosis.  Typically we do not need to follow these patients in our clinic and they can continue to followup with their primary and obviously if their primary had any concerns can contact us.  The patient became quite upset and stated that her doctor in [place] told her that she would need to be seen in the cancer clinic with lab work and imaging for the rest of her life.  I explained to patient that current guidelines suggest that after 5 years, the patients can transfer their care back to their primary care clinic. Also explained that we do not do any particular cancer labwork of imaging. Labs and imaging are based on symptoms. The patient was not happy with this, states that she is scared of the cancer coming back and cannot understand why she is not followed in the oncology clinic, especially when her oncologist in [place] told her to do so. We talked about that almost all cancer patients are worried about the cancer returning. This is common. There are no guarantees of whether the cancer will come back or not. Also, as she gets older her chances of a second malignancy increases which may not be related to this initial cancer. However, since we do not do routine imaging or lab work per guidelines, we would not necessarily pick it up any earlier than her primary. Patient not happy with recommendations. Offered referral to high-risk clinic which patient initially was hesitant but then agreed to be seen in.  2.  Reconstructed breast.  The patient was  "refered to the plastic surgeon 2 years ago since she reported lumpiness in the reconstructed breast.  She met with a general surgeon who concurred with patient touching base with plastic surgeon. Referral placed again.  3.  Genetics.  The patient met with the genetics team and was found to have variant of uncertain significance in the MSH2 gene, [id number].  Told patient the importance of contacting the genetics clinic once a year to see whether this variant has been reclassified.  I explained that if it is benign, we would not do anything further.  However, if it is reclassified to predict anything to be harmful, then genetics may have other recommendations.   4.  Port.  The patient still has her port on the left side from 2006. When she met with Dr. Colunga back in 2016, she had recommended that the port be removed.  According to Dr. Colunga's note \"Port-A-Cath has been in a long time and they are supposed to only stay in for 5 years at most.  I strongly recommend to Shaneka that the port be removed and replaced (per her request) with a PowerPort.\"  It does not appear patient contacted anyone to have the port removed.  On today's visit, I again emphasized to the patient the importance of having the port removed and the potential risks of having it stay in.  The patient states that she would like to have another port placed.  In questioning, the patient why she needs this, she initially stated it was for her MS drugs and other infusions and lab draws.  I explained to patient that from a cancer standpoint, we no longer need the port. I do not feel comfortable ordering a port, putting the patient through the risk of a procedure that I would not need.  However, if it is felt to be needed for her neurological reasons and labs and infusion-related to this, I would recommend that she talk to her MS neurologist to place the order if MD felt appropriate.  The patient then stated that it is not necessary for her MS medications " but for other infusions related to her migraine. Stated when she spoke to her MS Neurologist, he had recommended the patient speak to us about having a new port. At which time, I explained to the patient that if the port is for migraines, her primary who is ordering her migraine medications or whoever is ordering migraine medications should place the order for the port.  The patient became extremely upset and unhappy, stated that she was told by her [place] physician to always keep the port in.  She couldn't understand why I would not reorder a port replacement. States she has poor veins and needs the port for access.  I explained to the patient that ultimately it is her decision to have the port removed or not, but I would concur with Dr. Colunga's recommendation and strongly recommend that the port be removed.  I also related to her examples of where we had found that the port has broken off within the heart and that can potentially be fatal.  I also explained in detail to the patient that I do not require a port for any treatments, in fact, we would not necessarily be seeing her in our clinic; therefore I do not feel that it is appropriate for me to place an order. I would not be able to justify my reasons for a port if she were to have a complications. I could not also justify to her insurance carrier of a port placement for cancer treatment since she currently does not require cancer treatment. This would be insurance fraud. I also explained to the patient that I would contact her neurologist and if he feels it is necessary for her MS treatment, he can place the order.  Patient stated that her neurologist, Dr. Quiles, when she asked him about the port, had told her to speak to me.  In which case, I am more than happy to talk to Dr. Quiles about our recommendations.  Also I explained to the patient that if the port is for her migraines as followed by primary, then she should reach out to her primary.  The  "patient states that she is not comfortable with this plan of going through her primary, in fact, she will likely change her primary.  When I asked the patient of whether I should place an order for a port removal, which I am happy to do, since I do strongly feel that this should be taken out, the patient stated that we can hold off on it. She is meeting with another physician in a few weeks and at that time, if they are willing to order her a port replacement, she will have the port removed.  The patient will contact our clinic if she wants an order for port removal, but I will not place an order for port placement based on her current oncology care.     5.  Psychosocial.  During our visit, patient became very agitated and angry when we discussed the fact that I would not place an order for port replacement.  She also was very upset to hear that I do not do frequent lab draws or imaging from a cancer standpoint when patients are this far out from diagnosis.  I reviewed with the patient the current NCCN guidelines, I even offered to bring up this on the computer for her to see that patient's that are 5 years out, there is no indication for routine surveillance labs or imaging unless clinically indicated.  Also, after 5 years, we typically have the patient's follow up with their primary, they do not need to follow up in our clinic.  The patient stated that her oncologist in [place] was very clear that she was supposed to followup in Cancer Clinic for the rest of her life and have a port.  The patient became very upset saying \"I am sorry for wasting your time\".  I tried to calm her down and explained to her that even if she were to come in to our clinic, we would not be drawing any labs or imaging. She will always have a risk of the cancer coming back or a second primary. However, coming to see me will not change this outcome. She is currently not on any medication for her breast cancer.  I did, however, strongly " advise the patient that she continue to have close followup with her primary.  I did also think it was reasonable for the patient to be seen in our high-risk clinic.  She does have this variant of uncertain significance and was diagnosed with breast cancer at a young age with family history of malignancies.  Therefore, I thought this would be a reasonable compromise and hopefully calm her down.  From a breast cancer standpoint, we would not be doing anything different, but there may be different recommendations from the high-risk clinic.  Initially, the patient was very skeptical but then agreed to meet with Vandana Rome in followup.  When she meets Ms. Peter, if it is felt that she does not need to be seen in the high-risk clinic, she can follow up with her primary care.      Patient has been non-compliant with her visits to us and her primary per notes. She was last seen in our clinic in 8/2016 despite recommending following up with us to review test results. Per primary clinic's notes, patient was given rachel periods of medications until she would come in but was last seen by them in 12/2016. Numerous telephone encounters of patient calling for refills but not being seen in clinic. Patient was requesting refill of anti-depressants and ambien. Primary had given a short supply until patient came back to clinic. However, patient did not follow up but continued to ask for refills. From notes, appears patient became upset she wasn't given refills and stated she was going to another clinic.     Patient reports needing a port for infusions but I see no record of IV medications being given in the past year.     Based on interaction in [facility] and with our clinic today, question whether patient's reason to coming to see us today after 2 years was for port placement order. Once she did not receive this order became upset. Also, during the visit, patient intialy stated she needed port for her MS then changed answer  to migraines. But on further questioning she stated she was changing primaries. She stated port was for labs but she has not had labs through the [facility] system since 2016. I am not sure what patient's motive is for keeping the port when we have been very clear of the dangers of keeping it in place. Raises questions of what drugs she is taking that she has not revealed to us. Also, I am not sure who has been flushing the port.      The patient is seen from 12:17 p.m. to 12:48 p.m., a total of 31 minutes with greater than 50% in counseling and discussion with the patient regarding current standard of care for followup of breast cancer patients.  I will also be reaching out to Dr. Quiles with our recommendations and will carbon copy Dr. Pena who is the past primary of record.         TANESHA AMAYA MD             D: 2018   T: 2018   MT: AV      Name:     KYLE GU   MRN:      [id number]        Account:      [id number]   :      1960           Visit Date:   2018      Document: [id number]       cc: Caio Pena MD       Again, thank you for allowing me to participate in the care of your patient.        Sincerely,        Tanesha Amaya MD

## 2018-07-11 NOTE — NURSING NOTE
"Oncology Rooming Note    July 11, 2018 12:15 PM   Shaneka Alvarez is a 58 year old female who presents for:    Chief Complaint   Patient presents with     Oncology Clinic Visit     follow up     Initial Vitals: BP (!) 128/96  Pulse 87  Temp 97.2  F (36.2  C)  Resp 16  Ht 1.575 m (5' 2\")  Wt 78.5 kg (173 lb)  SpO2 97%  BMI 31.64 kg/m2 Estimated body mass index is 31.64 kg/(m^2) as calculated from the following:    Height as of this encounter: 1.575 m (5' 2\").    Weight as of this encounter: 78.5 kg (173 lb). Body surface area is 1.85 meters squared.  Mild Pain (3) Comment: headache    No LMP recorded. Patient is postmenopausal.  Allergies reviewed: Yes  Medications reviewed: Yes    Medications: Medication refills not needed today.  Pharmacy name entered into [facility]:    [facility] [address] [place], [place] - [address] [facility] [address] [place], [place] - [address] [facility]  [address]        5 minutes for nursing intake (face to face time)     Joy Baldwin LPN              "

## 2018-07-11 NOTE — PROGRESS NOTES
Visit Date:   07/11/2018      ONCOLOGY DIAGNOSIS:    1. January 2006:  Diagnosed with Stage IIB, T2 N1 M0 invasive lobular carcinoma of the right breast.  Final pathology showed a 4.5 x 3.8 x 2.5 cm, 01/14 lymph nodes positive.  Estrogen, progesterone receptor positive, HER-2 negative.     2.  Genetics.  BRCA1 and 2 mutations not detected. 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       THERAPY TO DATE:   1. 2006:  ECOG 2104 protocol of dose-dense Adriamycin, Cytoxan and Avastin x4 cycles followed by Taxol and Avastin x4 cycles.   2.  03/2007:  Completed 1 year Avastin.   3.  11/2006-01/2007:  Radiotherapy to the right breast of 5040 cGy.   4.  03/20073696-8710:  Aromasin.  Stopped after moving to the [facility].   5. 01/2016:  Right modified radical mastectomy with latissimus dorsi flap reconstruction and a left prophylactic mastectomy with latissimus dorsi flap reconstruction.      INTERVAL HISTORY:  Shaneka is a 58-year-old female diagnosed with stage IIB, T2 N1 M0 invasive lobular carcinoma of the right breast in 2006 after self-palpating a right breast mass.  The patient presents to clinic for followup of her malignancies.  The patient has not been seen in our clinic since 08/2016.  When she was last seen, she had come in, reporting lumpy areas in her breast.  We had referred her to plastic surgery who she did not see; however, she did follow with dermatology.  On today's visit, the patient stated that she decided to come in since she thought it was probably about time.  Does report continued lumpiness in her breast.  States that she did not see a plastic surgeon only saw a regular surgeon 2 years ago.  At that time, her surgeon had commented why her port was still in place and it probably should be removed.  The patient is followed by neurology for her MS.  Does report having headaches followed by primary and Neurology.  Uses Ambien to help her sleep.  No fevers, chills, chest pain, shortness of breath,  cough.  No new palpable masses and the remaining comprehensive review of systems is negative.      PAST MEDICAL HISTORY: Per patient no new medical history.   1.  Breast cancer, see above.   2.  Multiple sclerosis.   3.  Depression.   4.  Migraines.   5.  Hypertension.      SOCIAL HISTORY:  The patient comes in alone.  Has 3 biologic children.  No current tobacco use.      FAMILY HISTORY:  Per my note 08/2017.  Not addressed at today's visit.      PHYSICAL EXAMINATION:   VITAL SIGNS:  Blood pressure 128/96, pulse 87, respirations 16, temperature 97.2, pulse oximetry 97% on room air, weight 173 pounds, BMI 31.64.   GENERAL:  Comfortable, in no acute distress.   HEENT:  Atraumatic, normocephalic.  Pupils equal, round, reactive.  Anicteric.  Oropharynx moist mucous membranes.  No lesions, ulcers or exudate.   NECK:  Supple, full range of motion.  Trachea midline.   HEART:  Regular rate and rhythm, normal S1, S2, no murmurs, gallop.  No edema.   LUNGS:  Clear to auscultation bilateral crackles or wheezes.  Normal respiratory effort.   ABDOMEN:  Positive bowel sounds.  Soft, nontender, nondistended.  No hepatosplenomegaly.   EXTREMITIES:  No cyanosis.   MUSCULOSKELETAL:  No flank pain or point tenderness.   LYMPHATICS:  No supraclavicular, cervical or axillary nodes palpable.   SKIN:  No petechiae or rashes.   NEUROLOGIC:  Alert and oriented.   BREASTS:  Exam in the upright supine position.  Bilateral reconstructed.  No dominant mass over the anterior chest wall.   PSYCH: Upset, agitated.     IMAGING:  CT of the chest, abdomen and pelvis from 08/24/2016, no evidence of metastatic disease within chest, abdomen or pelvis.  Nonobstructing renal stone inferior pole of the left kidney measures up to 3 mm.  Diverticulosis without diverticulitis.      ASSESSMENT AND PLAN:   1.  History of stage IIB, T2 N1 M0 invasive lobular carcinoma of the right breast.  No evidence of recurrence by history, physical.  Patient's last CT  08/2016 did not show any evidence of recurrence and her last CA 27-29 was 08/2016, which was within normal limits.  I discussed at length with the patient regarding followup.  She is now nearly 12-1/2 years out from the time of diagnosis.  Typically we do not need to follow these patients in our clinic and they can continue to followup with their primary and obviously if their primary had any concerns can contact us.  The patient became quite upset and stated that her doctor in [place] told her that she would need to be seen in the cancer clinic with lab work and imaging for the rest of her life.  I explained to patient that current guidelines suggest that after 5 years, the patients can transfer their care back to their primary care clinic. Also explained that we do not do any particular cancer labwork of imaging. Labs and imaging are based on symptoms. The patient was not happy with this, states that she is scared of the cancer coming back and cannot understand why she is not followed in the oncology clinic, especially when her oncologist in [place] told her to do so. We talked about that almost all cancer patients are worried about the cancer returning. This is common. There are no guarantees of whether the cancer will come back or not. Also, as she gets older her chances of a second malignancy increases which may not be related to this initial cancer. However, since we do not do routine imaging or lab work per guidelines, we would not necessarily pick it up any earlier than her primary. Patient not happy with recommendations. Offered referral to high-risk clinic which patient initially was hesitant but then agreed to be seen in.  2.  Reconstructed breast.  The patient was refered to the plastic surgeon 2 years ago since she reported lumpiness in the reconstructed breast.  She met with a general surgeon who concurred with patient touching base with plastic surgeon. Referral placed again.  3.  Genetics.  The  "patient met with the genetics team and was found to have variant of uncertain significance in the MSH2 gene, [id number].  Told patient the importance of contacting the genetics clinic once a year to see whether this variant has been reclassified.  I explained that if it is benign, we would not do anything further.  However, if it is reclassified to predict anything to be harmful, then genetics may have other recommendations.   4.  Port.  The patient still has her port on the left side from 2006. When she met with Dr. Colunga back in 2016, she had recommended that the port be removed.  According to Dr. Colunga's note \"Port-A-Cath has been in a long time and they are supposed to only stay in for 5 years at most.  I strongly recommend to Shaneka that the port be removed and replaced (per her request) with a PowerPort.\"  It does not appear patient contacted anyone to have the port removed.  On today's visit, I again emphasized to the patient the importance of having the port removed and the potential risks of having it stay in.  The patient states that she would like to have another port placed.  In questioning, the patient why she needs this, she initially stated it was for her MS drugs and other infusions and lab draws.  I explained to patient that from a cancer standpoint, we no longer need the port. I do not feel comfortable ordering a port, putting the patient through the risk of a procedure that I would not need.  However, if it is felt to be needed for her neurological reasons and labs and infusion-related to this, I would recommend that she talk to her MS neurologist to place the order if MD felt appropriate.  The patient then stated that it is not necessary for her MS medications but for other infusions related to her migraine. Stated when she spoke to her MS Neurologist, he had recommended the patient speak to us about having a new port. At which time, I explained to the patient that if the port is for migraines, " her primary who is ordering her migraine medications or whoever is ordering migraine medications should place the order for the port.  The patient became extremely upset and unhappy, stated that she was told by her [place] physician to always keep the port in.  She couldn't understand why I would not reorder a port replacement. States she has poor veins and needs the port for access.  I explained to the patient that ultimately it is her decision to have the port removed or not, but I would concur with Dr. Colunga's recommendation and strongly recommend that the port be removed.  I also related to her examples of where we had found that the port has broken off within the heart and that can potentially be fatal.  I also explained in detail to the patient that I do not require a port for any treatments, in fact, we would not necessarily be seeing her in our clinic; therefore I do not feel that it is appropriate for me to place an order. I would not be able to justify my reasons for a port if she were to have a complications. I could not also justify to her insurance carrier of a port placement for cancer treatment since she currently does not require cancer treatment. This would be insurance fraud. I also explained to the patient that I would contact her neurologist and if he feels it is necessary for her MS treatment, he can place the order.  Patient stated that her neurologist, Dr. Quiles, when she asked him about the port, had told her to speak to me.  In which case, I am more than happy to talk to Dr. Quiles about our recommendations.  Also I explained to the patient that if the port is for her migraines as followed by primary, then she should reach out to her primary.  The patient states that she is not comfortable with this plan of going through her primary, in fact, she will likely change her primary.  When I asked the patient of whether I should place an order for a port removal, which I am happy to do,  "since I do strongly feel that this should be taken out, the patient stated that we can hold off on it. She is meeting with another physician in a few weeks and at that time, if they are willing to order her a port replacement, she will have the port removed.  The patient will contact our clinic if she wants an order for port removal, but I will not place an order for port placement based on her current oncology care.     5.  Psychosocial.  During our visit, patient became very agitated and angry when we discussed the fact that I would not place an order for port replacement.  She also was very upset to hear that I do not do frequent lab draws or imaging from a cancer standpoint when patients are this far out from diagnosis.  I reviewed with the patient the current NCCN guidelines, I even offered to bring up this on the computer for her to see that patient's that are 5 years out, there is no indication for routine surveillance labs or imaging unless clinically indicated.  Also, after 5 years, we typically have the patient's follow up with their primary, they do not need to follow up in our clinic.  The patient stated that her oncologist in [place] was very clear that she was supposed to followup in Cancer Clinic for the rest of her life and have a port.  The patient became very upset saying \"I am sorry for wasting your time\".  I tried to calm her down and explained to her that even if she were to come in to our clinic, we would not be drawing any labs or imaging. She will always have a risk of the cancer coming back or a second primary. However, coming to see me will not change this outcome. She is currently not on any medication for her breast cancer.  I did, however, strongly advise the patient that she continue to have close followup with her primary.  I did also think it was reasonable for the patient to be seen in our high-risk clinic.  She does have this variant of uncertain significance and was diagnosed with " breast cancer at a young age with family history of malignancies.  Therefore, I thought this would be a reasonable compromise and hopefully calm her down.  From a breast cancer standpoint, we would not be doing anything different, but there may be different recommendations from the high-risk clinic.  Initially, the patient was very skeptical but then agreed to meet with Vandana Peter in followup.  When she meets Ms. Peter, if it is felt that she does not need to be seen in the high-risk clinic, she can follow up with her primary care.      Patient has been non-compliant with her visits to us and her primary per notes. She was last seen in our clinic in 8/2016 despite recommending following up with us to review test results. Per primary clinic's notes, patient was given rachel periods of medications until she would come in but was last seen by them in 12/2016. Numerous telephone encounters of patient calling for refills but not being seen in clinic. Patient was requesting refill of anti-depressants and ambien. Primary had given a short supply until patient came back to clinic. However, patient did not follow up but continued to ask for refills. From notes, appears patient became upset she wasn't given refills and stated she was going to another clinic.     Patient reports needing a port for infusions but I see no record of IV medications being given in the past year.     Based on interaction in [facility] and with our clinic today, question whether patient's reason to coming to see us today after 2 years was for port placement order. Once she did not receive this order became upset. Also, during the visit, patient intialy stated she needed port for her MS then changed answer to migraines. But on further questioning she stated she was changing primaries. She stated port was for labs but she has not had labs through the [facility] system since 12/2016. I am not sure what patient's motive is for keeping the port when we  have been very clear of the dangers of keeping it in place. Raises questions of what drugs she is taking that she has not revealed to us. Also, I am not sure who has been flushing the port.      The patient is seen from 12:17 p.m. to 12:48 p.m., a total of 31 minutes with greater than 50% in counseling and discussion with the patient regarding current standard of care for followup of breast cancer patients.  I will also be reaching out to Dr. Quiles with our recommendations and will carbon copy Dr. Pena who is the past primary of record.         TANESHA MATA MD             D: 2018   T: 2018   MT: SANDIE      Name:     KYLE GU   MRN:      [id number]        Account:      [id number]   :      1960           Visit Date:   2018      Document: [id number]       cc: Caio Pena MD

## 2018-07-11 NOTE — MR AVS SNAPSHOT
After Visit Summary   7/11/2018    Shaneka Alvarez    MRN: [id number]           Patient Information     Date Of Birth          1960        Visit Information        Provider Department      7/11/2018 12:00 PM Stephany Amaya MD [facility]        Today's Diagnoses     Malignant neoplasm of right breast in female, estrogen receptor positive, unspecified site of breast (H)    -  1       Follow-ups after your visit        Additional Services     CANCER RISK MGMT/CANCER GENETIC COUNSELING REFERRAL           PLASTIC SURGERY REFERRAL                 Your next 10 appointments already scheduled     Jul 24, 2018 10:00 AM CDT   (Arrive by 9:45 AM)   New Patient Visit with GUTIERREZ [facility] ([facility]    [address]  3rd Floor  [facility] [id number] [phone]            Jul 25, 2018 11:00 AM CDT   New Visit with GUTIERREZ Tovar   [facility] ([facility])    [address] [place] [id number] [phone]            Jul 31, 2018  3:15 PM CDT   New Visit with MARÍA ELENA Bowden MD   [facility] ([facility])    [address] [place] [id number] [phone]              Who to contact     If you have questions or need follow up information about today's clinic visit or your schedule please contact [facility] directly at [phone].  Normal or non-critical lab and imaging results will be communicated to you by MyChart, letter or phone within 4 business days after the clinic has received the results. If you do not hear from us within 7 days, please contact the clinic through MyChart or phone. If you have a critical or abnormal lab result, we will notify you by phone as soon as possible.  Submit refill requests through [employer] or call your pharmacy and they will forward  "the refill request to us. Please allow 3 business days for your refill to be completed.          Additional Information About Your Visit        [employer] Information     [employer] gives you secure access to your electronic health record. If you see a primary care provider, you can also send messages to your care team and make appointments. If you have questions, please call your primary care clinic.  If you do not have a primary care provider, please call [phone] and they will assist you.      [employer] is an electronic gateway that provides easy, online access to your medical records. With [employer], you can request a clinic appointment, read your test results, renew a prescription or communicate with your care team.     To access your existing account, please contact your [facility] or call [phone] for assistance.        Care EveryWhere ID     This is your Care EveryWhere ID. This could be used by other organizations to access your [facility] medical records  [id number]        Your Vitals Were     Pulse Temperature Respirations Height Pulse Oximetry BMI (Body Mass Index)    87 97.2  F (36.2  C) 16 1.575 m (5' 2\") 97% 31.64 kg/m2       Blood Pressure from Last 3 Encounters:   07/11/18 (!) 128/96   07/10/18 (!) 145/102   07/11/17 (!) 145/94    Weight from Last 3 Encounters:   07/11/18 78.5 kg (173 lb)   07/10/18 78.8 kg (173 lb 12.8 oz)   07/11/17 88.7 kg (195 lb 9.6 oz)              We Performed the Following     CANCER RISK MGMT/CANCER GENETIC COUNSELING REFERRAL     PLASTIC SURGERY REFERRAL        Primary Care Provider Fax #    Physician No Ref-Primary [phone]       No address on file        Equal Access to Services     SAJAN CARTAGENA : Hadii livia Smith, iain collins, qagiselle vincent. So [facility] [phone].    ATENCIÓN: Si habla español, tiene a jacobs disposición servicios gratuitos de asistencia lingüística. " "Jasson yang [phone].    We comply with applicable federal civil rights laws and [place] laws. We do not discriminate on the basis of race, color, national origin, age, disability, sex, sexual orientation, or gender identity.            Thank you!     Thank you for choosing [facility]  for your care. Our goal is always to provide you with excellent care. Hearing back from our patients is one way we can continue to improve our services. Please take a few minutes to complete the written survey that you may receive in the mail after your visit with us. Thank you!             Your Updated Medication List - Protect others around you: Learn how to safely use, store and throw away your medicines at www.disposemymeds.org.          This list is accurate as of 7/11/18 12:59 PM.  Always use your most recent med list.                   Brand Name Dispense Instructions for use Diagnosis    busPIRone 15 MG tablet    BUSPAR    180 tablet    Take 1 tablet (15 mg) by mouth 2 times daily    Anxiety       calcium citrate-vitamin D 315-250 MG-UNIT Tabs per tablet    CITRACAL     Take by mouth daily        hydrOXYzine 25 MG tablet    ATARAX    30 tablet    Take 2-4 tablets ( mg) by mouth every 6 hours as needed for anxiety    Anxiety       ketorolac 60 MG/2ML Soln injection    TORADOL    2 mL    INJECT 2MLS INTO THE MUSCLE ONCE FOR 1 DOSE    Migraine without aura and without status migrainosus, not intractable, Neurogenic bladder       magnesium 250 MG tablet      Take 1 tablet by mouth daily        ONDANSETRON PO      Take 4 mg by mouth as needed    Neurogenic bladder       propranolol 60 MG 24 hr capsule    INDERAL LA    90 capsule    TAKE 1 CAPSULE(60 MG) BY MOUTH DAILY    Migraine without aura and without status migrainosus, not intractable       syringe/needle (disp) 23G X 1\" 3 ML Misc    B-D INTEGRA SYRINGE    25 each    1 each as needed    Migraine without aura and without status migrainosus, not " intractable       tolterodine 4 MG 24 hr capsule    DETROL LA    90 capsule    Take 1 capsule (4 mg) by mouth daily    Multiple sclerosis (H)       venlafaxine 225 MG Tb24 24 hr tablet    EFFEXOR-ER    15 tablet    TAKE 1 TABLET(225 MG) BY MOUTH DAILY WITH BREAKFAST    Anxiety       VITAMIN D3 PO      Take 2,000 Units by mouth daily        zolpidem 5 MG tablet    AMBIEN    30 tablet    TAKE 1 TABLET BY MOUTH AT BEDTIME AS NEEDED FOR SLEEP    Insomnia, unspecified type

## 2018-07-19 NOTE — TELEPHONE ENCOUNTER
FUTURE VISIT INFORMATION      FUTURE VISIT INFORMATION:    Date: 07/24/2018    Time:     Location:   REFERRAL INFORMATION:    Referring provider:  Caio Quiles MD    Referring providers clinic:      Reason for visit/diagnosis          RECORDS STATUS      Internal Records: [facility], Care Everywhere and PAC'S.

## 2018-07-24 ENCOUNTER — PRE VISIT (OUTPATIENT)
Dept: NEUROLOGY | Facility: CLINIC | Age: 58
End: 2018-07-24

## 2018-07-24 ENCOUNTER — TELEPHONE (OUTPATIENT)
Dept: ONCOLOGY | Facility: CLINIC | Age: 58
End: 2018-07-24

## 2018-07-24 ENCOUNTER — MEDICAL CORRESPONDENCE (OUTPATIENT)
Dept: HEALTH INFORMATION MANAGEMENT | Facility: CLINIC | Age: 58
End: 2018-07-24

## 2018-07-24 ENCOUNTER — CARE COORDINATION (OUTPATIENT)
Dept: NEUROLOGY | Facility: CLINIC | Age: 58
End: 2018-07-24

## 2018-07-24 ENCOUNTER — OFFICE VISIT (OUTPATIENT)
Dept: NEUROLOGY | Facility: CLINIC | Age: 58
End: 2018-07-24
Payer: COMMERCIAL

## 2018-07-24 VITALS
DIASTOLIC BLOOD PRESSURE: 107 MMHG | BODY MASS INDEX: 31.83 KG/M2 | WEIGHT: 173 LBS | OXYGEN SATURATION: 98 % | HEART RATE: 90 BPM | SYSTOLIC BLOOD PRESSURE: 150 MMHG | RESPIRATION RATE: 18 BRPM | TEMPERATURE: 96.8 F | HEIGHT: 62 IN

## 2018-07-24 DIAGNOSIS — G43.111 INTRACTABLE MIGRAINE WITH AURA WITH STATUS MIGRAINOSUS: Primary | ICD-10-CM

## 2018-07-24 DIAGNOSIS — Z85.3 HISTORY OF BREAST CANCER: ICD-10-CM

## 2018-07-24 DIAGNOSIS — Z76.89 ESTABLISHING CARE WITH NEW DOCTOR, ENCOUNTER FOR: Primary | ICD-10-CM

## 2018-07-24 DIAGNOSIS — G35 MS (MULTIPLE SCLEROSIS) (H): ICD-10-CM

## 2018-07-24 PROBLEM — F32.A ANXIETY AND DEPRESSION: Status: ACTIVE | Noted: 2017-10-25

## 2018-07-24 PROBLEM — F41.9 ANXIETY AND DEPRESSION: Status: ACTIVE | Noted: 2017-10-25

## 2018-07-24 ASSESSMENT — ENCOUNTER SYMPTOMS
DECREASED APPETITE: 1
NECK PAIN: 0
HEMATURIA: 0
TASTE DISTURBANCE: 0
EYE IRRITATION: 0
SKIN CHANGES: 0
DYSURIA: 0
HYPERTENSION: 1
CHILLS: 0
HOARSE VOICE: 0
SEIZURES: 0
INSOMNIA: 1
EYE PAIN: 1
BLOOD IN STOOL: 0
WEIGHT LOSS: 1
FATIGUE: 1
COUGH: 0
TREMORS: 1
LOSS OF CONSCIOUSNESS: 0
COUGH DISTURBING SLEEP: 0
ABDOMINAL PAIN: 0
TINGLING: 1
NIGHT SWEATS: 0
SINUS CONGESTION: 0
POLYDIPSIA: 0
ARTHRALGIAS: 0
PARALYSIS: 0
BOWEL INCONTINENCE: 1
STIFFNESS: 1
SPUTUM PRODUCTION: 0
HEADACHES: 1
DYSPNEA ON EXERTION: 0
MYALGIAS: 1
RECTAL PAIN: 1
MUSCLE WEAKNESS: 1
SMELL DISTURBANCE: 0
TROUBLE SWALLOWING: 0
HEMOPTYSIS: 0
POSTURAL DYSPNEA: 0
ORTHOPNEA: 0
NAUSEA: 1
SHORTNESS OF BREATH: 0
ALTERED TEMPERATURE REGULATION: 1
HYPOTENSION: 0
NERVOUS/ANXIOUS: 1
DEPRESSION: 1
NECK MASS: 0
DECREASED CONCENTRATION: 1
POOR WOUND HEALING: 0
WEIGHT GAIN: 0
PALPITATIONS: 0
HEARTBURN: 1
SNORES LOUDLY: 1
DECREASED LIBIDO: 1
FLANK PAIN: 0
PANIC: 0
POLYPHAGIA: 0
SYNCOPE: 0
DIARRHEA: 1
EYE WATERING: 0
CONSTIPATION: 0
MEMORY LOSS: 1
INCREASED ENERGY: 1
SPEECH CHANGE: 0
WHEEZING: 0
HOT FLASHES: 1
NAIL CHANGES: 0
LEG PAIN: 1
NUMBNESS: 1
DIFFICULTY URINATING: 1
LIGHT-HEADEDNESS: 1
DISTURBANCES IN COORDINATION: 1
BLOATING: 0
SINUS PAIN: 0
BACK PAIN: 1
DOUBLE VISION: 1
EYE REDNESS: 0
WEAKNESS: 1
DIZZINESS: 1
JOINT SWELLING: 0
MUSCLE CRAMPS: 1
FEVER: 0
JAUNDICE: 0
EXERCISE INTOLERANCE: 1
HALLUCINATIONS: 0
VOMITING: 1
SLEEP DISTURBANCES DUE TO BREATHING: 0

## 2018-07-24 ASSESSMENT — PAIN SCALES - GENERAL: PAINLEVEL: MILD PAIN (3)

## 2018-07-24 NOTE — PROGRESS NOTES
[employer] forms filled out and signed by Emelia and the patient and faxed to [employer].  Will call the patient to schdule an appointment with myself once the medication is received.

## 2018-07-24 NOTE — PROGRESS NOTES
"Re: Shaneka Alvarez      MRN# [id number]  YOB: 1960  Date of Visit:2018     OUTPATIENT NEUROLOGY VISIT NOTE    Chief Complaint:  Headache evaluation    History of Present Illness  Shaneka Alvarez is a 58-year-old female  presents to the clinic today for headache evaluation.  S/p double mastectomy, s/p chemo and radiation therapy in [id number] and reports that saw her oncologist and will see a genetic counselor  [facility] and has been [facility]   Headache History:      Onset History:  20 years ago and even earlier than that -infancy/childhood. Motion sickness even in infancy patient states that her mother would tell her.     Current Headache Pattern:      Frequency (How many headache days per month?): at least 2-3 per week that required some type of treatment and may last several days and milder headaches about 2 per week. Headache frequency 20 headache days per month for about 10 years.      Aura: sometimes \"flashing lights\" in the peripheral vision for about 15 minutes but not always see it     Associated Symptoms:  nausea, vomiting, light sensitivity, sound sensitivity and blurred or double vision at times, slurring words (when she gets tired or overheated)       Description of Headache Pain & Location:  Top and \"inside\" one side or the other or both sides      Level of Pain as headache is startin/10      Level of Pain during usual headache:  2/10      Level of Pain during the worst headache:  5+/10 when it gets to \"5/10\" \"it keeps going.\"       Do headaches interfere with or prevent usual activities or diminish your productivity at home or work?  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      Past Headache Treatments Tried:  DHE and toradol about 2-3 times per week and partially effective  Topiramate 200 mg BID  for a while and was not effective and made patient \"forgetful\"  Protriptyline (Vivactil) " "outside provider and was not effective  Sumatriptan nasal and injections and all of the triptans (Maxalt, Relpax) were not effective  Botox injections were not effective for the cost but looks like that only 100 units were given   Was considered for Tysabri and 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  Venlafaxine helpful for anxiety/depression but not headaches  Naproxen as needed   Propranolol 60 mg ER -unsure of the effectiveness  Buspar for anxiety and headaches  Gabapentin-caused \"stomach sickness\"  Chlorpromazine -for nausea and has been helpful  Tizanidine -\"does not remember\"  bystolic  sumavel  Amitriptyline   Depakote  Verapamil  Petadolax 75 mg twice daily and was not effective  Physical therapy in the past and unsure of the effectiveness  Have not tried Cefaly  Have not done acupuncture    Have you needed to utilize the Emergency Room to treat your headache symptoms?  If so, how often and when was the last time used:  Yes - ED and hospitalizations   Per Dr Dowd note, \"  She was admitted from 11/20-24/2014. Repeat brain MRI scan did not show any acute demyelinating plaques. No evidence of old or acute plaques on spine MRI scans. Headache was treated with Decadron x 1, DHE IV infusion, Toradol, magnesium and Depacon. The right arm weakness improved to baseline, but right leg weakness persisted. It was felt that she had functional weakness as there was no compensatory effort from the left leg, and no effort in trying to lift the right leg. At hospital discharge, the headache was much better, but no entirely gone. She's been working with home PT for the right leg weakness. She feels the leg strength is much better\".       Are Headaches worsening over time?  No    What makes your headaches better?  dark room, quiet room and laying still alternating toradol and DHE (injectable DHE and toradol)    What makes your headaches worse or triggers your headaches? Tired, overheated, not enough sleep, weather or barometric " pressure, a lot of stress or sometimes unknown triggers.   Headache worse with red wine    Patient reports that she takes Zolpidem for sleeping evry night     Neurodiagnostic Testing  MRI brain reviewed  Brain MR without and with contrast      Provided History: ; Multiple sclerosis (H)  ICD-10: Multiple sclerosis (H)     Comparison: 7/1/2016     Technique: Sagittal T2 FLAIR, axial T2 FLAIR, axial diffusion  weighted, and axial T1-weighted images of the brain were obtained  without the administration of intravenous contrast. After the  administration of intravenous contrast, axial T2-weighted, axial T2*,  axial and coronal T1-weighted, and coronal T2 FLAIR images of the  brain were obtained.     Contrast: 10mL Gadavist     Findings: The images demonstrate no mass lesions, midline shift, or  abnormal extraaxial fluid collections. There are, however, 5, 5-10,   foci of T2-hyperintensity within the cerebral white matter that raise  the question of underlying demyelinating disease. Specifically, there  are lesions within the left thalamus and bilateral periventricular  white matter. Additionally there is persistent T2 hyperintensity seen  in the pontine white matter which is likely not significantly changed.  The T1-weighted images no areas of marked hypointensity, also known as  black holes. No cerebral atrophy. Following the administration of  contrast media, there is no enhancement noted. No new lesions seen.          Impression: The study demonstrates 5-10 foci of T2-hyperintensity  within the cerebral white matter consistent with the clinical  suspicion of demyelinating disease. There is no enhancement noted .  There is no interval change from the prior study.      SHERI NICOLE MD           Past Medical History reviewed and verified with the patient  Past Medical History:   Diagnosis Date     Breast cancer (H)     Age 44     Common migraine      H/O bilateral mastectomy      Mild major depression (H)      Multiple  "sclerosis (H)        Past Surgical History reviewed and verified with the patient  Past Surgical History:   Procedure Laterality Date     HC REMOVAL OF OVARY/TUBE(S)       HERNIA REPAIR, UMBILICAL       mastectomy  Bilateral 2006     MASTECTOMY, BILATERAL       Family History reviewed and verified with the patient  Unknown family history of headaches and no neurological history  Social History:  Moved from [place] in 2014 because of 's job, works as a teacher, has 3 kids-34, 24 and 18 and only one lives in the [facility]   Social History   Substance Use Topics     Smoking status: Former Smoker     Types: Cigarettes     Quit date: 12/1/2005     Smokeless tobacco: Never Used     Alcohol use 1.2 oz/week     1 Glasses of wine, 1 Cans of beer per week      Comment: TWICE A WEEK    reviewed and verified with the patient     Allergies   Allergen Reactions     Bactrim [Sulfamethoxazole W/Trimethoprim]      Internal bleeding     Copaxone [Glatiramer] Hives       Current Outpatient Prescriptions   Medication Sig Dispense Refill     busPIRone (BUSPAR) 15 MG tablet Take 1 tablet (15 mg) by mouth 2 times daily 180 tablet 1     calcium citrate-vitamin D (CITRACAL) 315-250 MG-UNIT TABS Take by mouth daily       Cholecalciferol (VITAMIN D3 PO) Take 2,000 Units by mouth daily       hydrOXYzine (ATARAX) 25 MG tablet Take 2-4 tablets ( mg) by mouth every 6 hours as needed for anxiety 30 tablet 1     ketorolac (TORADOL) 60 MG/2ML SOLN injection INJECT 2MLS INTO THE MUSCLE ONCE FOR 1 DOSE 2 mL 0     magnesium 250 MG tablet Take 1 tablet by mouth daily       ONDANSETRON PO Take 4 mg by mouth as needed        propranolol (INDERAL LA) 60 MG 24 hr capsule TAKE 1 CAPSULE(60 MG) BY MOUTH DAILY 90 capsule 1     syringe/needle, disp, (B-D INTEGRA SYRINGE) 23G X 1\" 3 ML MISC 1 each as needed 25 each 0     tolterodine (DETROL LA) 4 MG 24 hr capsule Take 1 capsule (4 mg) by mouth daily 90 capsule 3     venlafaxine (EFFEXOR-ER) 225 " "MG TB24 24 hr tablet TAKE 1 TABLET(225 MG) BY MOUTH DAILY WITH BREAKFAST 15 tablet 0     zolpidem (AMBIEN) 5 MG tablet TAKE 1 TABLET BY MOUTH AT BEDTIME AS NEEDED FOR SLEEP 30 tablet 0   reviewed and verified with the patient    Review of Systems:  A 12-point ROS including constitutional, eyes, ENT, respiratory, cardiovascular, gastroenterology, genitourinary, integumentary, musculoskeletal, neurology, hematology and psychiatric were all reviewed with the patient and completed at the Neuroscience Services Question nary and as mentioned in the HPI.     General Exam:   BP (!) 150/107  Pulse 90  Temp 96.8  F (36  C) (Oral)  Resp 18  Ht 1.575 m (5' 2\")  Wt 78.5 kg (173 lb)  SpO2 98%  Breastfeeding? No  BMI 31.64 kg/m2   PHQ-2 Score:     PHQ-2 ( 1999 [employer]) 7/10/2018 8/23/2016   Q1: Little interest or pleasure in doing things 0 0   Q2: Feeling down, depressed or hopeless 0 0   PHQ-2 Score 0 0     GEN: Awake, NAD; good eye contact, responses appropriately, reports that headache now is 5/10  HEENT: Head atraumatic/Normocephalic. Scalp normal. Pupils equally round, 4 mm, reactive to light and accommodation, sclera and conjunctiva normal. Fundoscopic examination reveals normal vessels no papilledema.   Neck: Easily moveable without resistance  Heart: S1/S2 appreciated, RRR, no m/r/g, no carotid bruits  Lungs:Lungs are clear to auscultation bilaterally, no wheezes or crackles.   Neurological Examination:  The patient is alert and oriented times four. Has good attention and concentration. Speech is fluent without dysarthria.   Cranial nerves:  CN I deferred.   CN II: Intact and full visual fields to confrontation bilaterally. CN III, IV, VI: EOM intact. There is no nystagmus. Has conjugated gaze. Intact direct and consensual pupillary light reflexes.   CN V: Intact and symmetrical to facial sensation in the V1 through V3 bilaterally.   CN VII: Intact and symmetrical eyebrow and lid raise and eyelid closure, smiles and " frown.   CN VIII: Intact to finger rub bilaterally.   CN IX and X: The palates elevates symmetrical. The uvula is midline.   CN XII: The tongue protrudes midline with no atrophy or fasciculations.   Motor exam: The patient has a normal bulk and tone throughout. There is no atrophy, fasciculations, clonus, or abnormal movements appreciated.   Strength Exam:  5/5 strength at shoulder abduction, elbow flexion or extension, wrist flexion or extension, finger abduction, , hip flexion and extension, knee flexion and extension, and dorsiflexion and plantarflexion bilaterally.   Sensation is intact to light touch  throughout. Reflexes are 2+ and symmetrical at biceps, triceps, brachioradialis, patellar, and Achilles.   Negative Babinski with downgoing toes bilaterally.   Coordination reveals finger-nose-finger with normal speed and accuracy. Normal casual gait.    DATA:  Results for KYLE GU (MRN [id number]) as of 7/24/2018 12:00   Ref. Range 12/29/2016 09:46 12/29/2016 10:08   Sodium Latest Ref Range: 133 - 144 mmol/L 142    Potassium Latest Ref Range: 3.4 - 5.3 mmol/L 4.6    Chloride Latest Ref Range: 94 - 109 mmol/L 107    Carbon Dioxide Latest Ref Range: 20 - 32 mmol/L 24    Urea Nitrogen Latest Ref Range: 7 - 30 mg/dL 11    Creatinine Latest Ref Range: 0.52 - 1.04 mg/dL 0.82    GFR Estimate Latest Ref Range: >60 mL/min/1.7m2 72    GFR Estimate If Black Latest Ref Range: >60 mL/min/1.7m2 87    Calcium Latest Ref Range: 8.5 - 10.1 mg/dL 8.8    Anion Gap Latest Ref Range: 3 - 14 mmol/L 11    Albumin Latest Ref Range: 3.4 - 5.0 g/dL 3.7    Protein Total Latest Ref Range: 6.8 - 8.8 g/dL 7.5    Bilirubin Total Latest Ref Range: 0.2 - 1.3 mg/dL 0.4    Alkaline Phosphatase Latest Ref Range: 40 - 150 U/L 103    ALT Latest Ref Range: 0 - 50 U/L 32    AST Latest Ref Range: 0 - 45 U/L 26    Hemoglobin A1C Latest Ref Range: 4.3 - 6.0 %  5.4   Cholesterol Latest Ref Range: <200 mg/dL 291 (H)    HDL Cholesterol Latest Ref  Range: >49 mg/dL 52    LDL Cholesterol Calculated Latest Ref Range: <100 mg/dL 205 (H)    Non HDL Cholesterol Latest Ref Range: <130 mg/dL 239 (H)    Triglycerides Latest Ref Range: <150 mg/dL 172 (H)    Glucose Latest Ref Range: 70 - 99 mg/dL 106 (H)    Hepatitis C Antibody Latest Ref Range: NR  Nonreactive...          Assessment and Plan:  Chronic intractable migraine not responding to preventive treatment, see HPI for headache treatment tried so far. Stable exam findings today.   Headache Treatment options discussed with the patient, including but not limited to Aimovig indication, side effects and use.   Plan:  Headache calendar for frequency, severity, acute medications use   Acupuncture trial or consider physical therapy for myofascial release  May consider alternative pain treatment approaches-cognitive behavior therapy or biofeedback or relazation/meditation programs, hypnosis or other mind-body pain relieving modalities  A trial of Aimovig for headache prevention and no apparent contraindications in patient's with history of MS per our pharmacist.   In meanwhile, may try to increase propranolol to 120 mg ER daily for 2 weeks and may consider to increase to 160-180 mg daily if tolerated.   Follow up in 3 months after starting Aimovig.     Elevated blood pressure today. Follow up with your primary MD for blood pressure recheck. May try lisinopril or candesartan a small dose which can be helpful with controlling your blood pressure and headaches if propranolol were not effective or per primary MD recommendations of other blood pressure controlling meds.  Caution with DHE use if your blood pressure is poorly controlled-may cause hypertensive crisis and/or stroke.      I discussed all my recommendation with Shaneka Alvarez. The patient verbalizes understanding and comfortable with the plan. The patient has our clinic phone number to call with any questions or concerns. All of the patient's questions were answered  from the best of my current knowledge.       Thank you for letting me be a part of the treatment team for Shaneka Alvarez      Time spent with pt answering questions, discussing findings, counseling and coordinating care was more than 50% the appointment time, 58  minutes.         GUTIERREZ Larsen, [facility]

## 2018-07-24 NOTE — MR AVS SNAPSHOT
After Visit Summary   7/24/2018    Shaneka Alvarez    MRN: [id number]           Patient Information     Date Of Birth          1960        Visit Information        Provider Department      7/24/2018 10:00 AM Emelia Mixon APRN CNP [facility]        Today's Diagnoses     Intractable migraine with aura with status migrainosus    -  1    MS (multiple sclerosis) (H)        History of breast cancer          Care Instructions    Plan:  Headache calendar for frequency, severity, acute medications use   Acupuncture trial consider physical therapy for myofascial release  May consider alternative pain treatment approaches-cognitive behavior therapy or biofeedback or relazation/meditation programs, hypnosis or other mind-body pain relieving modalities  A trial of Aimovig for headache prevention. Will need to check with the pharmacist.   In meanwhile, may try to increase propranolol to 120 mg ER daily for 2 weeks and may consider to increase to 160-180 mg daily if tolerated.   Follow up in 3 months after starting Aimovig.     Elevated blood pressure today. Follow up with your primary MD for blood pressure recheck. May try lisinopril or candesartan a small dose which can be helpful with controlling your blood pressure and headaches if propranolol were not effective or per primary MD recommendations of other blood pressure controlling meds.  Caution with DHE use if your blood pressure is poorly controlled-may cause hypertensive crisis and/or stroke.                Follow-ups after your visit        Additional Services     ACUPUNCTURE REFERRAL       Your provider has referred you to: [facility]: [facility] [phone]    Please be aware that coverage of these services is subject to the terms and limitations of your health insurance plan.  Call member services at your health plan with any benefit or coverage questions.      Please bring the following with you to your  appointment:    (1) Any X-Rays, CTs or MRIs which have been performed.  Contact the facility where they were done to arrange for  prior to your scheduled appointment.  (2) List of current medications   (3) This referral request   (4) Any documents/labs given to you for this referral  Services Requested: INTERVENTIONAL: Injection Only: Please indicate type of injection: acupuncture    Please answer the following questions:    1. How long have you been treating this patient for this pain issue?      Many years  2. Have there been any of the following problematic behaviors?      no      Psychiatric History or Current Psych/Social Issues: no but history of MS and breast cancer  3. Has the patient been to any other pain clinics and/or programs?     No                  Your next 10 appointments already scheduled     Jul 25, 2018 11:00 AM CDT   [facility]REZ [facility]ar   [facility]    [address] [place] [id number]   [phone]            Jul 31, 2018  3:15 PM CDT   New Visit with MARÍA ELENA Bowden MD   [facility]    [address] [place] [id number]   [phone]            Jul 16, 2019 10:00 AM CDT   (Arrive by 9:45 AM)   Return Multiple Sclerosis with Caio Quiles MD   [facility] ([facility])    [address] [place] [address]  [address] [place] [id number] [phone]              Who to contact     Please call your clinic at [phone] to:    Ask questions about your health    Make or cancel appointments    Discuss your medicines    Learn about your test results    Speak to your doctor            Additional Information About Your Visit        Easy [employer] Information     [employer] gives you secure access to your electronic health record. If you see a primary care provider, you can also send messages to your  "care team and make appointments. If you have questions, please call your primary care clinic.  If you do not have a primary care provider, please call [phone] and they will assist you.      [employer] is an electronic gateway that provides easy, online access to your medical records. With [employer], you can request a clinic appointment, read your test results, renew a prescription or communicate with your care team.     To access your existing account, please contact your [facility] or call [phone] for assistance.        Care EveryWhere ID     This is your Care EveryWhere ID. This could be used by other organizations to access your [facility] medical records  [id number]        Your Vitals Were     Pulse Temperature Respirations Height Pulse Oximetry Breastfeeding?    90 96.8  F (36  C) (Oral) 18 1.575 m (5' 2\") 98% No    BMI (Body Mass Index)                   31.64 kg/m2            Blood Pressure from Last 3 Encounters:   07/24/18 (!) 150/107   07/11/18 (!) 128/96   07/10/18 (!) 145/102    Weight from Last 3 Encounters:   07/24/18 78.5 kg (173 lb)   07/11/18 78.5 kg (173 lb)   07/10/18 78.8 kg (173 lb 12.8 oz)              We Performed the Following     ACUPUNCTURE REFERRAL        Primary Care Provider Fax #    Physician No Ref-Primary [phone]       No address on file        Equal Access to Services     SAJAN CARTAGENA : Hadii livia nesso Sorema, waaxda luqadaha, qaybta kaalmada susanne, giselle vora . So [facility] [phone].    ATENCIÓN: Si habla español, tiene a jacobs disposición servicios gratuitos de asistencia lingüística. Llame al [phone].    We comply with applicable federal civil rights laws and [place] laws. We do not discriminate on the basis of race, color, national origin, age, disability, sex, sexual orientation, or gender identity.            Thank you!     Thank you for choosing [facility]  for your care. Our goal is " "always to provide you with excellent care. Hearing back from our patients is one way we can continue to improve our services. Please take a few minutes to complete the written survey that you may receive in the mail after your visit with us. Thank you!             Your Updated Medication List - Protect others around you: Learn how to safely use, store and throw away your medicines at www.disposemymeds.org.          This list is accurate as of 7/24/18 12:23 PM.  Always use your most recent med list.                   Brand Name Dispense Instructions for use Diagnosis    busPIRone 15 MG tablet    BUSPAR    180 tablet    Take 1 tablet (15 mg) by mouth 2 times daily    Anxiety       calcium citrate-vitamin D 315-250 MG-UNIT Tabs per tablet    CITRACAL     Take by mouth daily        hydrOXYzine 25 MG tablet    ATARAX    30 tablet    Take 2-4 tablets ( mg) by mouth every 6 hours as needed for anxiety    Anxiety       ketorolac 60 MG/2ML Soln injection    TORADOL    2 mL    INJECT 2MLS INTO THE MUSCLE ONCE FOR 1 DOSE    Migraine without aura and without status migrainosus, not intractable, Neurogenic bladder       magnesium 250 MG tablet      Take 1 tablet by mouth daily        ONDANSETRON PO      Take 4 mg by mouth as needed    Neurogenic bladder       propranolol 60 MG 24 hr capsule    INDERAL LA    90 capsule    TAKE 1 CAPSULE(60 MG) BY MOUTH DAILY    Migraine without aura and without status migrainosus, not intractable       syringe/needle (disp) 23G X 1\" 3 ML Misc    B-D INTEGRA SYRINGE    25 each    1 each as needed    Migraine without aura and without status migrainosus, not intractable       tolterodine 4 MG 24 hr capsule    DETROL LA    90 capsule    Take 1 capsule (4 mg) by mouth daily    Multiple sclerosis (H)       venlafaxine 225 MG Tb24 24 hr tablet    EFFEXOR-ER    15 tablet    TAKE 1 TABLET(225 MG) BY MOUTH DAILY WITH BREAKFAST    Anxiety       VITAMIN D3 PO      Take 2,000 Units by mouth daily        " zolpidem 5 MG tablet    AMBIEN    30 tablet    TAKE 1 TABLET BY MOUTH AT BEDTIME AS NEEDED FOR SLEEP    Insomnia, unspecified type

## 2018-07-24 NOTE — PATIENT INSTRUCTIONS
Plan:  Headache calendar for frequency, severity, acute medications use   Acupuncture trial consider physical therapy for myofascial release  May consider alternative pain treatment approaches-cognitive behavior therapy or biofeedback or relazation/meditation programs, hypnosis or other mind-body pain relieving modalities  A trial of Aimovig for headache prevention. Will need to check with the pharmacist.   In meanwhile, may try to increase propranolol to 120 mg ER daily for 2 weeks and may consider to increase to 160-180 mg daily if tolerated.   Follow up in 3 months after starting Aimovig.     Elevated blood pressure today. Follow up with your primary MD for blood pressure recheck. May try lisinopril or candesartan a small dose which can be helpful with controlling your blood pressure and headaches if propranolol were not effective or per primary MD recommendations of other blood pressure controlling meds.  Caution with DHE use if your blood pressure is poorly controlled-may cause hypertensive crisis and/or stroke.

## 2018-07-24 NOTE — LETTER
"2018       RE: Shaneka Alvarez  [address] [facility] [address]     Dear Colleague,    Thank you for referring your patient, Shaneka Alvarez, to the [facility] NEUROLOGY at [facility]. Please see a copy of my visit note below.    Re: Shaneka Alvarez      MRN# [id number]  YOB: 1960  Date of Visit:2018     OUTPATIENT NEUROLOGY VISIT NOTE    Chief Complaint:  Headache evaluation    History of Present Illness  Shaneka Alvarez is a 58-year-old female  presents to the clinic today for headache evaluation.  S/p double mastectomy, s/p chemo and radiation therapy in [id number] and reports that saw her oncologist and will see a genetic counselor  MS and has been [facility]   Headache History:      Onset History:  20 years ago and even earlier than that -infancy/childhood. Motion sickness even in infancy patient states that her mother would tell her.     Current Headache Pattern:      Frequency (How many headache days per month?): at least 2-3 per week that required some type of treatment and may last several days and milder headaches about 2 per week. Headache frequency 20 headache days per month for about 10 years.      Aura: sometimes \"flashing lights\" in the peripheral vision for about 15 minutes but not always see it     Associated Symptoms:  nausea, vomiting, light sensitivity, sound sensitivity and blurred or double vision at times, slurring words (when she gets tired or overheated)       Description of Headache Pain & Location:  Top and \"inside\" one side or the other or both sides      Level of Pain as headache is startin/10      Level of Pain during usual headache:  2/10      Level of Pain during the worst headache:  5+/10 when it gets to \"5/10\" \"it keeps going.\"       Do headaches interfere with or prevent usual activities or diminish your productivity at home or work?  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      Past Headache Treatments Tried:  DHE and toradol about 2-3 times per week and partially effective  Topiramate 200 mg BID  for a while and was not effective and made patient \"forgetful\"  Protriptyline (Vivactil) outside provider and was not effective  Sumatriptan nasal and injections and all of the triptans (Maxalt, Relpax) were not effective  Botox injections were not effective for the cost but looks like that only 100 units were given   Was considered for Tysabri and 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  Venlafaxine helpful for anxiety/depression but not headaches  Naproxen as needed   Propranolol 60 mg ER -unsure of the effectiveness  Buspar for anxiety and headaches  Gabapentin-caused \"stomach sickness\"  Chlorpromazine -for nausea and has been helpful  Tizanidine -\"does not remember\"  bystolic  sumavel  Amitriptyline   Depakote  Verapamil  Petadolax 75 mg twice daily and was not effective  Physical therapy in the past and unsure of the effectiveness  Have not tried Cefaly  Have not done acupuncture    Have you needed to utilize the Emergency Room to treat your headache symptoms?  If so, how often and when was the last time used:  Yes - ED and hospitalizations   Per Dr Dowd note, \"  She was admitted from 11/20-24/2014. Repeat brain MRI scan did not show any acute demyelinating plaques. No evidence of old or acute plaques on spine MRI scans. Headache was treated with Decadron x 1, DHE IV infusion, Toradol, magnesium and Depacon. The right arm weakness improved to baseline, but right leg weakness persisted. It was felt that she had functional weakness as there was no compensatory effort from the left leg, and no effort in trying to lift the right leg. At hospital discharge, the headache was much better, but no entirely gone. She's been working with home PT for the right leg weakness. She feels the leg strength is much better\".       Are Headaches worsening over " time?  No    What makes your headaches better?  dark room, quiet room and laying still alternating toradol and DHE (injectable DHE and toradol)    What makes your headaches worse or triggers your headaches? Tired, overheated, not enough sleep, weather or barometric pressure, a lot of stress or sometimes unknown triggers.   Headache worse with red wine    Patient reports that she takes Zolpidem for sleeping evry night     Neurodiagnostic Testing  MRI brain reviewed  Brain MR without and with contrast      Provided History: ; Multiple sclerosis (H)  ICD-10: Multiple sclerosis (H)     Comparison: 7/1/2016     Technique: Sagittal T2 FLAIR, axial T2 FLAIR, axial diffusion  weighted, and axial T1-weighted images of the brain were obtained  without the administration of intravenous contrast. After the  administration of intravenous contrast, axial T2-weighted, axial T2*,  axial and coronal T1-weighted, and coronal T2 FLAIR images of the  brain were obtained.     Contrast: 10mL Gadavist     Findings: The images demonstrate no mass lesions, midline shift, or  abnormal extraaxial fluid collections. There are, however, 5, 5-10,   foci of T2-hyperintensity within the cerebral white matter that raise  the question of underlying demyelinating disease. Specifically, there  are lesions within the left thalamus and bilateral periventricular  white matter. Additionally there is persistent T2 hyperintensity seen  in the pontine white matter which is likely not significantly changed.  The T1-weighted images no areas of marked hypointensity, also known as  black holes. No cerebral atrophy. Following the administration of  contrast media, there is no enhancement noted. No new lesions seen.          Impression: The study demonstrates 5-10 foci of T2-hyperintensity  within the cerebral white matter consistent with the clinical  suspicion of demyelinating disease. There is no enhancement noted .  There is no interval change from the prior  study.      SHERI NICOLE MD           Past Medical History reviewed and verified with the patient  Past Medical History:   Diagnosis Date     Breast cancer (H)     Age 44     Common migraine      H/O bilateral mastectomy      Mild major depression (H)      Multiple sclerosis (H)        Past Surgical History reviewed and verified with the patient  Past Surgical History:   Procedure Laterality Date     HC REMOVAL OF OVARY/TUBE(S)       HERNIA REPAIR, UMBILICAL       mastectomy  Bilateral 2006     MASTECTOMY, BILATERAL       Family History reviewed and verified with the patient  Unknown family history of headaches and no neurological history  Social History:  Moved from [place] in 2014 because of 's job, works as a teacher, has 3 kids-34, 24 and 18 and only one lives in the [facility]   Social History   Substance Use Topics     Smoking status: Former Smoker     Types: Cigarettes     Quit date: 12/1/2005     Smokeless tobacco: Never Used     Alcohol use 1.2 oz/week     1 Glasses of wine, 1 Cans of beer per week      Comment: TWICE A WEEK    reviewed and verified with the patient     Allergies   Allergen Reactions     Bactrim [Sulfamethoxazole W/Trimethoprim]      Internal bleeding     Copaxone [Glatiramer] Hives       Current Outpatient Prescriptions   Medication Sig Dispense Refill     busPIRone (BUSPAR) 15 MG tablet Take 1 tablet (15 mg) by mouth 2 times daily 180 tablet 1     calcium citrate-vitamin D (CITRACAL) 315-250 MG-UNIT TABS Take by mouth daily       Cholecalciferol (VITAMIN D3 PO) Take 2,000 Units by mouth daily       hydrOXYzine (ATARAX) 25 MG tablet Take 2-4 tablets ( mg) by mouth every 6 hours as needed for anxiety 30 tablet 1     ketorolac (TORADOL) 60 MG/2ML SOLN injection INJECT 2MLS INTO THE MUSCLE ONCE FOR 1 DOSE 2 mL 0     magnesium 250 MG tablet Take 1 tablet by mouth daily       ONDANSETRON PO Take 4 mg by mouth as needed        propranolol (INDERAL LA) 60 MG 24 hr capsule TAKE 1  "CAPSULE(60 MG) BY MOUTH DAILY 90 capsule 1     syringe/needle, disp, (B-D [employer]TE[employer]RA SYRINGE) 23G X 1\" 3 ML MISC 1 each as needed 25 each 0     tolterodine (DETROL LA) 4 MG 24 hr capsule Take 1 capsule (4 mg) by mouth daily 90 capsule 3     venlafaxine (EFFEXOR-ER) 225 MG TB24 24 hr tablet TAKE 1 TABLET(225 MG) BY MOUTH DAILY WITH BREAKFAST 15 tablet 0     zolpidem (AMBIEN) 5 MG tablet TAKE 1 TABLET BY MOUTH AT BEDTIME AS NEEDED FOR SLEEP 30 tablet 0   reviewed and verified with the patient    Review of Systems:  A 12-point ROS including constitutional, eyes, ENT, respiratory, cardiovascular, gastroenterology, genitourinary, integumentary, musculoskeletal, neurology, hematology and psychiatric were all reviewed with the patient and completed at the Neuroscience Services Question nary and as mentioned in the HPI.     General Exam:   BP (!) 150/107  Pulse 90  Temp 96.8  F (36  C) (Oral)  Resp 18  Ht 1.575 m (5' 2\")  Wt 78.5 kg (173 lb)  SpO2 98%  Breastfeeding? No  BMI 31.64 kg/m2   PHQ-2 Score:     PHQ-2 ( 1999 [employer]) 7/10/2018 8/23/2016   Q1: Little interest or pleasure in doing things 0 0   Q2: Feeling down, depressed or hopeless 0 0   PHQ-2 Score 0 0     GEN: Awake, NAD; good eye contact, responses appropriately, reports that headache now is 5/10  HEENT: Head atraumatic/Normocephalic. Scalp normal. Pupils equally round, 4 mm, reactive to light and accommodation, sclera and conjunctiva normal. Fundoscopic examination reveals normal vessels no papilledema.   Neck: Easily moveable without resistance  Heart: S1/S2 appreciated, RRR, no m/r/g, no carotid bruits  Lungs:Lungs are clear to auscultation bilaterally, no wheezes or crackles.   Neurological Examination:  The patient is alert and oriented times four. Has good attention and concentration. Speech is fluent without dysarthria.   Cranial nerves:  CN I deferred.   CN II: Intact and full visual fields to confrontation bilaterally. CN III, IV, VI: EOM intact. " There is no nystagmus. Has conjugated gaze. Intact direct and consensual pupillary light reflexes.   CN V: Intact and symmetrical to facial sensation in the V1 through V3 bilaterally.   CN VII: Intact and symmetrical eyebrow and lid raise and eyelid closure, smiles and frown.   CN VIII: Intact to finger rub bilaterally.   CN IX and X: The palates elevates symmetrical. The uvula is midline.   CN XII: The tongue protrudes midline with no atrophy or fasciculations.   Motor exam: The patient has a normal bulk and tone throughout. There is no atrophy, fasciculations, clonus, or abnormal movements appreciated.   Strength Exam:  5/5 strength at shoulder abduction, elbow flexion or extension, wrist flexion or extension, finger abduction, , hip flexion and extension, knee flexion and extension, and dorsiflexion and plantarflexion bilaterally.   Sensation is intact to light touch  throughout. Reflexes are 2+ and symmetrical at biceps, triceps, brachioradialis, patellar, and Achilles.   Negative Babinski with downgoing toes bilaterally.   Coordination reveals finger-nose-finger with normal speed and accuracy. Normal casual gait.    DATA:  Results for KYLE GU (MRN [id number]) as of 7/24/2018 12:00   Ref. Range 12/29/2016 09:46 12/29/2016 10:08   Sodium Latest Ref Range: 133 - 144 mmol/L 142    Potassium Latest Ref Range: 3.4 - 5.3 mmol/L 4.6    Chloride Latest Ref Range: 94 - 109 mmol/L 107    Carbon Dioxide Latest Ref Range: 20 - 32 mmol/L 24    Urea Nitrogen Latest Ref Range: 7 - 30 mg/dL 11    Creatinine Latest Ref Range: 0.52 - 1.04 mg/dL 0.82    GFR Estimate Latest Ref Range: >60 mL/min/1.7m2 72    GFR Estimate If Black Latest Ref Range: >60 mL/min/1.7m2 87    Calcium Latest Ref Range: 8.5 - 10.1 mg/dL 8.8    Anion Gap Latest Ref Range: 3 - 14 mmol/L 11    Albumin Latest Ref Range: 3.4 - 5.0 g/dL 3.7    Protein Total Latest Ref Range: 6.8 - 8.8 g/dL 7.5    Bilirubin Total Latest Ref Range: 0.2 - 1.3 mg/dL 0.4     Alkaline Phosphatase Latest Ref Range: 40 - 150 U/L 103    ALT Latest Ref Range: 0 - 50 U/L 32    AST Latest Ref Range: 0 - 45 U/L 26    Hemoglobin A1C Latest Ref Range: 4.3 - 6.0 %  5.4   Cholesterol Latest Ref Range: <200 mg/dL 291 (H)    HDL Cholesterol Latest Ref Range: >49 mg/dL 52    LDL Cholesterol Calculated Latest Ref Range: <100 mg/dL 205 (H)    Non HDL Cholesterol Latest Ref Range: <130 mg/dL 239 (H)    Triglycerides Latest Ref Range: <150 mg/dL 172 (H)    Glucose Latest Ref Range: 70 - 99 mg/dL 106 (H)    Hepatitis C Antibody Latest Ref Range: NR  Nonreactive...          Assessment and Plan:  Chronic intractable migraine not responding to preventive treatment, see HPI for headache treatment tried so far. Stable exam findings today.   Headache Treatment options discussed with the patient, including but not limited to Aimovig indication, side effects and use.   Plan:  Headache calendar for frequency, severity, acute medications use   Acupuncture trial or consider physical therapy for myofascial release  May consider alternative pain treatment approaches-cognitive behavior therapy or biofeedback or relazation/meditation programs, hypnosis or other mind-body pain relieving modalities  A trial of Aimovig for headache prevention and no apparent contraindications in patient's with history of MS per our pharmacist.   In meanwhile, may try to increase propranolol to 120 mg ER daily for 2 weeks and may consider to increase to 160-180 mg daily if tolerated.   Follow up in 3 months after starting Aimovig.     Elevated blood pressure today. Follow up with your primary MD for blood pressure recheck. May try lisinopril or candesartan a small dose which can be helpful with controlling your blood pressure and headaches if propranolol were not effective or per primary MD recommendations of other blood pressure controlling meds.  Caution with DHE use if your blood pressure is poorly controlled-may cause hypertensive  crisis and/or stroke.      I discussed all my recommendation with Shaneka Alvarez. The patient verbalizes understanding and comfortable with the plan. The patient has our clinic phone number to call with any questions or concerns. All of the patient's questions were answered from the best of my current knowledge.       Thank you for letting me be a part of the treatment team for Shaneka Desirnings      Time spent with pt answering questions, discussing findings, counseling and coordinating care was more than 50% the appointment time, 58  minutes.       Again, thank you for allowing me to participate in the care of your patient.      Sincerely,    GUTIERREZ Dunn CNP

## 2018-07-25 ASSESSMENT — PATIENT HEALTH QUESTIONNAIRE - PHQ9: SUM OF ALL RESPONSES TO PHQ QUESTIONS 1-9: 6

## 2018-07-27 ENCOUNTER — TELEPHONE (OUTPATIENT)
Dept: NEUROLOGY | Facility: CLINIC | Age: 58
End: 2018-07-27

## 2018-07-27 DIAGNOSIS — G43.111 INTRACTABLE MIGRAINE WITH AURA WITH STATUS MIGRAINOSUS: ICD-10-CM

## 2018-07-27 NOTE — TELEPHONE ENCOUNTER
[facility] Prior Authorization Team Request    Medication: Aimovig  Dosing: Inject 2 ml under the skin every 30 days  NDC (required for Medicaid members): [id number]    Insurance   BIN: [id number]  PCN: [id number]  Grp:   ID: [id number]    CoverMyMeds Key (if applicable):     Additional documentation: Please contact AMINA Hewitt with any clinical questions [phone]    Filling Pharmacy: [facility]  Phone Number: [phone]  Contact: AIDEN PHARM TRESA PA ([phone]) please send all responses to this pool.  Pharmacy NPI (required for Medicaid members): [id number]

## 2018-07-31 ENCOUNTER — OFFICE VISIT (OUTPATIENT)
Dept: SURGERY | Facility: CLINIC | Age: 58
End: 2018-07-31
Attending: INTERNAL MEDICINE
Payer: COMMERCIAL

## 2018-07-31 VITALS — HEIGHT: 62 IN | BODY MASS INDEX: 31.83 KG/M2 | WEIGHT: 173 LBS

## 2018-07-31 DIAGNOSIS — Z98.890 S/P BREAST RECONSTRUCTION, BILATERAL: ICD-10-CM

## 2018-07-31 PROCEDURE — 99214 OFFICE O/P EST MOD 30 MIN: CPT | Performed by: PLASTIC SURGERY

## 2018-07-31 NOTE — PROGRESS NOTES
REFERRING PHYSICIAN:  Stephany Amaya MD, Oncology       PRESENTING COMPLAINT:  Consultation for firmness in both reconstructed breasts.      HISTORY OF PRESENTING COMPLAINT:  Ms. Alvarez is 58 years old.  Twelve years ago in [place] she was diagnosed with right-sided breast cancer, underwent bilateral nipple nonsparing mastectomy with simultaneous latissimus dorsi flap and direct implant placement followed by adjuvant chemo and radiation therapy.  The patient subsequently had a revisional surgery done where she was reshaped and the implants were placed less than a year later.  Since then, the implants have not been changed.  She feels that her implants have become firmer, slightly tight feeling, but no pain per se.  Here to have them looked at.  She also has her port still in place.      PAST MEDICAL HISTORY:  Multiple sclerosis, Raynauds, arthritis, migraines, anxiety and depression.      PAST SURGICAL HISTORY:  Open oophorectomy and laparoscopic cholecystectomy.      MEDICATIONS:  BuSpar, ondansetron, propranolol, Effexor, Ambien, hydroxyzine and Aimovig.      ALLERGIES:   Bactrim and Copaxone.       SOCIAL HISTORY:  Used to smoke 1/2 pack a day for about 4 years, quit in 2005.  Drinks socially.  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      REVIEW OF SYSTEMS:  Denies chest pain, shortness of breath, MI, CVA, DVT and PE.      PHYSICAL EXAMINATION:  On review of systems vital signs stable.  She is afebrile in no obvious distress.  She is 5 feet 2 inches, 173 pounds, BMI 31.7 kg/m2.  On examination of her chest, she has a left-sided port.  Her right breast actually has an aesthetic outcome.  No nipple.  Firm to the touch, grade 3 capsular contracture.  Left breast on the other hand has some indentations and has a teardrop shape in the lower pole and also has grade 3 capsular contracture.  She has firing latissimus muscles in the axillary area.      ASSESSMENT AND PLAN:  Based on above findings, diagnosis of bilateral  breast reconstruction for breast cancer with asymmetries was made.  The patient is not too terribly concerned about the aesthetics of the breast.  She does have grade 3 capsular contracture but no pain.  It does not interfere with the day-to-day activities.  Discussed with the patient about the fact that her implants could be ruptured, but given the fact she has no limitations in her day-to-day activities the patient is not interested in any surgery which will be the way to treat all the asymmetries as well as the potential rupture.  We, therefore, decided that the patient will follow this conservatively.  If she develops pain,  she will come back or if she changes her mind, she will come back.  I advised continued self-breast exams and, of course, if anything changes in any way to let us know.  All questions were answered.  She was happy with the visit.  I will see her back on a p.r.n. basis.  All exam done in the presence of my nurse.      Total time spent with the patient 30 minutes, more than half was counseling.      cc:   Stephany Amaya MD   Oncology

## 2018-07-31 NOTE — NURSING NOTE
"Shaneka Alvarez's goals for this visit include: consult breast reconstruction   She requests these members of her care team be copied on today's visit information:       PCP: No Ref-Primary, Physician    Referring Provider:  Stephany Amaya MD  [address]  [place] [address]    Ht 1.575 m (5' 2\")  Wt 78.5 kg (173 lb)  BMI 31.64 kg/m2    Do you need any medication refills at today's visit? N/a.Paige Tran RN      "

## 2018-07-31 NOTE — LETTER
7/31/2018         RE: Shaneka Alvarez  [address] [facility] [address]        Dear Colleague,    Thank you for referring your patient, Shaneka Alvarez, to the [facility]. Please see a copy of my visit note below.    REFERRING PHYSICIAN:  Stephany Amaya MD, Oncology       PRESENTING COMPLAINT:  Consultation for firmness in both reconstructed breasts.      HISTORY OF PRESENTING COMPLAINT:  Ms. Alvarez is 58 years old.  Twelve years ago in [place] she was diagnosed with right-sided breast cancer, underwent bilateral nipple nonsparing mastectomy with simultaneous latissimus dorsi flap and direct implant placement followed by adjuvant chemo and radiation therapy.  The patient subsequently had a revisional surgery done where she was reshaped and the implants were placed less than a year later.  Since then, the implants have not been changed.  She feels that her implants have become firmer, slightly tight feeling, but no pain per se.  Here to have them looked at.  She also has her port still in place.      PAST MEDICAL HISTORY:  Multiple sclerosis, Raynauds, arthritis, migraines, anxiety and depression.      PAST SURGICAL HISTORY:  Open oophorectomy and laparoscopic cholecystectomy.      MEDICATIONS:  BuSpar, ondansetron, propranolol, Effexor, Ambien, hydroxyzine and Aimovig.      ALLERGIES:   Bactrim and Copaxone.       SOCIAL HISTORY:  Used to smoke 1/2 pack a day for about 4 years, quit in 2005.  Drinks socially.  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      REVIEW OF SYSTEMS:  Denies chest pain, shortness of breath, MI, CVA, DVT and PE.      PHYSICAL EXAMINATION:  On review of systems vital signs stable.  She is afebrile in no obvious distress.  She is 5 feet 2 inches, 173 pounds, BMI 31.7 kg/m2.  On examination of her chest, she has a left-sided port.  Her right breast actually has an aesthetic outcome.  No nipple.  Firm to the touch, grade 3 capsular contracture.  Left breast on the other  hand has some indentations and has a teardrop shape in the lower pole and also has grade 3 capsular contracture.  She has firing latissimus muscles in the axillary area.      ASSESSMENT AND PLAN:  Based on above findings, diagnosis of bilateral breast reconstruction for breast cancer with asymmetries was made.  The patient is not too terribly concerned about the aesthetics of the breast.  She does have grade 3 capsular contracture but no pain.  It does not interfere with the day-to-day activities.  Discussed with the patient about the fact that her implants could be ruptured, but given the fact she has no limitations in her day-to-day activities the patient is not interested in any surgery which will be the way to treat all the asymmetries as well as the potential rupture.  We, therefore, decided that the patient will follow this conservatively.  If she develops pain,  she will come back or if she changes her mind, she will come back.  I advised continued self-breast exams and, of course, if anything changes in any way to let us know.  All questions were answered.  She was happy with the visit.  I will see her back on a p.r.n. basis.  All exam done in the presence of my nurse.      Total time spent with the patient 30 minutes, more than half was counseling.      cc:   Stephany Amaya MD   Oncology         Again, thank you for allowing me to participate in the care of your patient.        Sincerely,        MARÍA ELENA Bowden MD

## 2018-07-31 NOTE — MR AVS SNAPSHOT
After Visit Summary   7/31/2018    Shaneka Alvarez    MRN: [id number]           Patient Information     Date Of Birth          1960        Visit Information        Provider Department      7/31/2018 3:15 PM MARÍA ELENA Bowden MD [facility]        Care Instructions    Port removal - Dr. Cassidy Lewisasdonnysuman.  [phone].          Follow-ups after your visit        Your next 10 appointments already scheduled     Jul 16, 2019 10:00 AM CDT   (Arrive by 9:45 AM)   Return Multiple Sclerosis with Caio Quiles MD   [facility] Multiple Sclerosis ([facility])    [address]  [address]  [place] [facility] [address] [phone]              Who to contact     If you have questions or need follow up information about today's clinic visit or your schedule please contact [facility] directly at [phone].  Normal or non-critical lab and imaging results will be communicated to you by [employer], letter or phone within 4 business days after the clinic has received the results. If you do not hear from us within 7 days, please contact the clinic through [employer] or phone. If you have a critical or abnormal lab result, we will notify you by phone as soon as possible.  Submit refill requests through [employer] or call your pharmacy and they will forward the refill request to us. Please allow 3 business days for your refill to be completed.          Additional Information About Your Visit        [employer] Information     [employer] gives you secure access to your electronic health record. If you see a primary care provider, you can also send messages to your care team and make appointments. If you have questions, please call your primary care clinic.  If you do not have a primary care provider, please call [phone] and they will assist you.      [employer] is an electronic gateway that provides easy, online access to your medical records. With [employer],  "you can request a clinic appointment, read your test results, renew a prescription or communicate with your care team.     To access your existing account, please contact your [facility] or call [phone] for assistance.        Care EveryWhere ID     This is your Care EveryWhere ID. This could be used by other organizations to access your [place] medical records  [id number]        Your Vitals Were     Height BMI (Body Mass Index)                1.575 m (5' 2\") 31.64 kg/m2           Blood Pressure from Last 3 Encounters:   07/24/18 (!) 150/107   07/11/18 (!) 128/96   07/10/18 (!) 145/102    Weight from Last 3 Encounters:   07/31/18 78.5 kg (173 lb)   07/24/18 78.5 kg (173 lb)   07/11/18 78.5 kg (173 lb)              Today, you had the following     No orders found for display       Primary Care Provider Fax #    Physician No Ref-Primary [phone]       No address on file        Equal Access to Services     SAJAN CARTAGENA : Hadii aad ku hadasho Soomaali, waaxda luqadaha, qaybta kaalmada adeegyada, waxay maribel vora . So [facility] [phone].    ATENCIÓN: Si habla español, tiene a jacobs disposición servicios gratuitos de asistencia lingüística. Llame al [phone].    We comply with applicable federal civil rights laws and [place] laws. We do not discriminate on the basis of race, color, national origin, age, disability, sex, sexual orientation, or gender identity.            Thank you!     Thank you for choosing [facility]  for your care. Our goal is always to provide you with excellent care. Hearing back from our patients is one way we can continue to improve our services. Please take a few minutes to complete the written survey that you may receive in the mail after your visit with us. Thank you!             Your Updated Medication List - Protect others around you: Learn how to safely use, store and throw away your medicines at " "www.disposemymeds.org.          This list is accurate as of 7/31/18  3:58 PM.  Always use your most recent med list.                   Brand Name Dispense Instructions for use Diagnosis    busPIRone 15 MG tablet    BUSPAR    180 tablet    Take 1 tablet (15 mg) by mouth 2 times daily    Anxiety       calcium citrate-vitamin D 315-250 MG-UNIT Tabs per tablet    CITRACAL     Take by mouth daily        erenumab-aooe 70 MG/ML injection    AIMOVIG 140 DOSE    2 mL    Inject 2 mLs (140 mg) Subcutaneous every 30 days    Intractable migraine with aura with status migrainosus       hydrOXYzine 25 MG tablet    ATARAX    30 tablet    Take 2-4 tablets ( mg) by mouth every 6 hours as needed for anxiety    Anxiety       ketorolac 60 MG/2ML Soln injection    TORADOL    2 mL    INJECT 2MLS INTO THE MUSCLE ONCE FOR 1 DOSE    Migraine without aura and without status migrainosus, not intractable, Neurogenic bladder       magnesium 250 MG tablet      Take 1 tablet by mouth daily        ONDANSETRON PO      Take 4 mg by mouth as needed    Neurogenic bladder       propranolol 60 MG 24 hr capsule    INDERAL LA    90 capsule    TAKE 1 CAPSULE(60 MG) BY MOUTH DAILY    Migraine without aura and without status migrainosus, not intractable       syringe/needle (disp) 23G X 1\" 3 ML Misc    B-D INTEGRA SYRINGE    25 each    1 each as needed    Migraine without aura and without status migrainosus, not intractable       tolterodine 4 MG 24 hr capsule    DETROL LA    90 capsule    Take 1 capsule (4 mg) by mouth daily    Multiple sclerosis (H)       venlafaxine 225 MG Tb24 24 hr tablet    EFFEXOR-ER    15 tablet    TAKE 1 TABLET(225 MG) BY MOUTH DAILY WITH BREAKFAST    Anxiety       VITAMIN D3 PO      Take 2,000 Units by mouth daily        zolpidem 5 MG tablet    AMBIEN    30 tablet    TAKE 1 TABLET BY MOUTH AT BEDTIME AS NEEDED FOR SLEEP    Insomnia, unspecified type         "

## 2018-08-01 NOTE — TELEPHONE ENCOUNTER
MARÍA ELENA [facility] Prior Authorization Team   Phone: [phone]  Fax: [phone]  Prior Authorization Approval    Authorization Effective Date: 7/1/2018  Authorization Expiration Date: 1/1/2019  Medication: Aimovig-PA APPROVED  Approved Dose/Quantity: 2ML/30DS  Reference #: CASE [id number]   Insurance Company: [employer] - Phone [phone] Fax [phone]  Expected CoPay:       CoPay Card Available:      Foundation Assistance Needed:    Which Pharmacy is filling the prescription (Not needed for infusion/clinic administered): [facility] - [place], [place] - [address]  Pharmacy Notified: Yes  Patient Notified: Yes

## 2018-08-01 NOTE — TELEPHONE ENCOUNTER
Spoke with [facility] pharmacy and the prescription has a $12 copay.    Routing to GUTIERREZ Larsen and Yvette Smith RN as FYI. Yvette will likely reach out to patient to schedule injection teaching in clinic.    Deann Hewitt, Pharm.D.  Medication Therapy Management Pharmacist  Phone: [phone]

## 2018-08-01 NOTE — TELEPHONE ENCOUNTER
[facility] Prior Authorization Team   Phone: [phone]  Fax: [phone]  PA Initiation    Medication: Aimovig-PA PENDING  Insurance Company: [email or web address] - Phone [phone] Fax [phone]  Pharmacy Filling the Rx: [facility] - [place], [place] - [address]  Filling Pharmacy Phone: [phone]  Filling Pharmacy Fax: [phone]  Start Date: 8/1/2018

## 2018-08-07 NOTE — TELEPHONE ENCOUNTER
Called Shaneka to inform her that the Aimovig was approved and will be available for  at the [facility] pharmacy on the first floor and asked if she would like to schedule an appointment with myself for education and assistance with the giving the first dose of aimovig.  Shaneka verbalized understanding and is scheduled for a nurse visit with myself tomorrow 8/8/18 at 10:30

## 2018-08-08 ENCOUNTER — TELEPHONE (OUTPATIENT)
Dept: NEUROLOGY | Facility: CLINIC | Age: 58
End: 2018-08-08

## 2018-08-08 NOTE — TELEPHONE ENCOUNTER
[facility] Call Center    Phone Message    May a detailed message be left on voicemail: yes    Reason for Call: Other: Pt thought she was supposed to go to  for the injection. She also said she spoke to the  and they stated they could send her the injection directly. Please give her a call back to discuss.      Action Taken: Message routed to:  [facility] ([facility] Neurology

## 2018-08-09 NOTE — TELEPHONE ENCOUNTER
Spoke with patient and she states that she has extensive experience with administering injections.  She lives about an hour away from the [facility] so she would prefer to not come into clinic for an injection training.  I also offered the Aimovig nurse training program but she would prefer that they not come to her home.  Patient has watched the Aimovig videos and feels comfortable administering the injection herself.  She would prefer to get the medication mailed to her home through [facility] pharmacy.  Patient may also receive the free trial from the drug company as she has been in communication with them.    Discussed with [facility] pharmacy and they will reach out to the patient for delivery.     Deann Hewitt, Pharm.D.  Medication Therapy Management Pharmacist  Phone: [phone]

## 2019-07-16 ENCOUNTER — OFFICE VISIT (OUTPATIENT)
Dept: NEUROLOGY | Facility: CLINIC | Age: 59
End: 2019-07-16
Attending: PSYCHIATRY & NEUROLOGY
Payer: COMMERCIAL

## 2019-07-16 VITALS
HEIGHT: 62 IN | DIASTOLIC BLOOD PRESSURE: 79 MMHG | BODY MASS INDEX: 31.03 KG/M2 | HEART RATE: 80 BPM | SYSTOLIC BLOOD PRESSURE: 117 MMHG | WEIGHT: 168.6 LBS

## 2019-07-16 DIAGNOSIS — G35 MULTIPLE SCLEROSIS (H): ICD-10-CM

## 2019-07-16 PROCEDURE — G0463 HOSPITAL OUTPT CLINIC VISIT: HCPCS | Mod: ZF

## 2019-07-16 RX ORDER — TOLTERODINE 4 MG/1
4 CAPSULE, EXTENDED RELEASE ORAL DAILY
Qty: 90 CAPSULE | Refills: 3 | Status: SHIPPED | OUTPATIENT
Start: 2019-07-16 | End: 2020-01-14

## 2019-07-16 ASSESSMENT — MIFFLIN-ST. JEOR: SCORE: 1303.01

## 2019-07-16 ASSESSMENT — PAIN SCALES - GENERAL: PAINLEVEL: NO PAIN (0)

## 2019-07-16 NOTE — PROGRESS NOTES
"  DEPARTMENT OF NEUROLOGY  MULTIPLE SCLEROSIS CLINIC  Patient Name: Shaneka Alvarez  MRN: [id number]  : 1960  Date of Clinic Visit: 2019  Primary Care Provider: No Ref-Primary, Physician    FOLLOW-UP FOR: RRMS    Previous DMT: Copaxone (hives), Rebif  Current DMT: none  MRI Brain: 7/10/2018    INTERVAL HISTORY:  Shaneka Alvarez is a 59 year old female presenting for follow-up for RRMS.   She was last seen in clinic 1 year ago. Since then she's had some worsening of her symptoms.   Intermittently her vision will become blurry or even double. This happens at least once a week. This makes it hard to see things especially at a distance. These episodes can last hours to a day or two. Symptoms do not typically fluctuate within a day over hours. She denies issues with color vision. She typically wears bifocal glasses daily. She has not seen an ophthalmologist or optometrist for new glasses.   Her balance is \"wobbly.\" She'll sometimes \"fall into a wall\" or misjudge a door and run into the door frame. She does this about once a week. She falls sometimes but has not hurt herself significantly, attributes this to instability in the winter. She even endorses difficulty riding a bicycle, which she used to do without issue. She thinks it's just harder to control the bike and she feels like she's going to fall over.   She is still having some bladder urgency. She's up about 3 times every night. During the day, the summer is easier because of not having to work, so she urinates about once every hour; during the school year, she's limited to about 3 times a day. The Detrol helps but she's been out for months and needs a refill.   She also endorses bowel urgency. She endorses having between 4 and 6 episodes of bowel incontinence over the past year.   She has some leg cramping particularly at night, but she tries to mitigate this with yoga and stretches. The leg cramping can actually cause her R foot to monet " "painfully.   She endorses numbness and tingling on her R side, excluding her face. She also still gets \"zaps\" of electricity sensation in her occiput triggered by head turning.   Her mood is good, no thoughts of self-harm. Her HAs are better now that she's been on Aimovig, though some insurance issues have prevented her from getting it regularly which can affect her HAs.    ASSESSMENT AND PLAN:  59F w/ RRMS. Over the past year it seems like she's gotten slowly worse. This fits a clinical piture of secondary progression, for which we don't have a lot of good options. We spent time discussing the ACTH trial and she'll let us know if she's interested in that.    Plan:  - refill Detrol  - will give her information on the ACTH trial  - RTC 6 months    Patient was seen and discussed with Dr. Quiles.    Urbano Duarte MD  Neurology PGY4  [facility]    PHYSICAL EXAMINATION:  Vitals: /79 (BP Location: Left arm, Patient Position: Chair, Cuff Size: Adult Large)   Pulse 80   Ht 1.575 m (5' 2\")   Wt 76.5 kg (168 lb 9.6 oz)   BMI 30.84 kg/m    General: appears well  Neuro:    Mental status: alert; language is fluent with intact comprehension   Cranial nerves: PERRLA, no APD, no HAZEL, conjugate gaze, EOMI without nystagmus, full visual fields, no papilledema, no facial asymmetry or ptosis, facial sensation intact/symmetric, hearing intact to conversation, no dysarthria, no hypophonia   Motor: normal tone in all limbs, no abnormal movements   RIGHT LEFT    5 5   FDI 4+ 5   2nd/3rd digit extension 5 5   4th/5th digit extension 5 5   Biceps 4+ 5   Triceps 4+ 5   Deltoid 4+ 5   Hip flexion 4 5   Knee extension 4 5   Knee flexion 4 5   Dorsiflexion 5 5   Plantar flexion 5 5    Reflexes: slightly more brisk on the R side   Sensory: reduecd LT in RLE by ~25%, reduced temperature sensation in L foot; Romberg ok   Coordination: No dysmetria on finger-nose-finger, no dysdiadochokinesia   Gait: normal " "station, gait is slightly wide-based but not much, slightly reduced R arm swing while walking, tandem gait is unstable and improves with little support    INVESTIGATIONS:  No new MRI or labs to review; last MRI was done in 2018 and was personally reviewed with Dr. Quiles.    REVIEW OF SYSTEMS: 12-point RoS negative except as per HPI.    MEDICATIONS:  Current Outpatient Medications   Medication Sig Dispense Refill     busPIRone (BUSPAR) 15 MG tablet Take 1 tablet (15 mg) by mouth 2 times daily 180 tablet 1     calcium citrate-vitamin D (CITRACAL) 315-250 MG-UNIT TABS Take by mouth daily       Cholecalciferol (VITAMIN D3 PO) Take 2,000 Units by mouth daily       erenumab-aooe (AIMOVIG 140 DOSE) 70 MG/ML injection Inject 2 mLs (140 mg) Subcutaneous every 30 days (Patient not taking: Reported on 7/31/2018) 2 mL 6     hydrOXYzine (ATARAX) 25 MG tablet Take 2-4 tablets ( mg) by mouth every 6 hours as needed for anxiety (Patient not taking: Reported on 7/31/2018) 30 tablet 1     ketorolac (TORADOL) 60 MG/2ML SOLN injection INJECT 2MLS INTO THE MUSCLE ONCE FOR 1 DOSE 2 mL 0     magnesium 250 MG tablet Take 1 tablet by mouth daily       ONDANSETRON PO Take 4 mg by mouth as needed        propranolol (INDERAL LA) 60 MG 24 hr capsule TAKE 1 CAPSULE(60 MG) BY MOUTH DAILY 90 capsule 1     syringe/needle, disp, (B-D INTEGRA SYRINGE) 23G X 1\" 3 ML MISC 1 each as needed 25 each 0     tolterodine (DETROL LA) 4 MG 24 hr capsule Take 1 capsule (4 mg) by mouth daily 90 capsule 3     venlafaxine (EFFEXOR-ER) 225 MG TB24 24 hr tablet TAKE 1 TABLET(225 MG) BY MOUTH DAILY WITH BREAKFAST 15 tablet 0     zolpidem (AMBIEN) 5 MG tablet TAKE 1 TABLET BY MOUTH AT BEDTIME AS NEEDED FOR SLEEP 30 tablet 0     This note was written with the assistance of the Dragon voice-dictation technology software. The final document, although reviewed, may contain errors. For corrections, please contact the office.     I saw and examined this patient, " and have read and edited the resident's note.  I agree with the findings, assessment, and plan.  I spent 27 minutes with the patient, greater than 50% in counseling and coordination of care.  She has insidiously developed some R hemiparesis over the past few years, which combined with her complaints of gradual symptomatic worsening indicate a transition to the secondary progressive stage of MS.  We discussed that, and the lack of any proven treatment to slow or stop this gradually worsening neurologic function.  Siponimod sought FDA approval for SPMS, but was not granted it (except in the presence of ongoing relapses, which she does not have).  I discussed an available clinical trial of ACTH or placebo for SPMS, and she will consider it.    Caio Quiles MD

## 2019-08-09 ENCOUNTER — NURSE TRIAGE (OUTPATIENT)
Dept: INTERNAL MEDICINE | Facility: CLINIC | Age: 59
End: 2019-08-09

## 2019-08-09 NOTE — TELEPHONE ENCOUNTER
I suggest we get her in to see Kia Flores next week.  This does not sound like MS relapse.  I think starting her on rituximab is reasonable - Kia can go over that with her, and get baseline labs (Hep B panel, baseline immunoglobulin levels), and find out more about the leg pain.

## 2019-08-09 NOTE — TELEPHONE ENCOUNTER
"[facility]: Nurse Triage Note  SITUATION/BACKGROUND                                                      Shaneka Alvarez is a 59 year old female who calls with  worsening mobility, worsening dizziness and headaches( more frequent and constant in nature)  LVD Dr Quiles 7/16/2019 for RRMS  Symptoms:  1.Leg pain bilaterally: 1-10 is a 7 so hard to be mobile, legs are weaker, can get to standing but wobbles- if legs directly under her she manages\" like stilts\". No falls as yet since symptoms worsened. She lives at home with .  2.Visual changes: distance vision feels different- fuzziness.   3.Feels pretty crummy and worried  No recent illnesses,fever  This status represents a  significant change from one month ago  Stress, travel for  family emergency recently likely exacerbated symptoms.    Her symptoms and she is wondering about active treatment now  -She is taking no disease modifying therapy  -She isn't interested in ACTH trial she would rather get treatment.  Dr Quiles's last visit plan:  \"ASSESSMENT AND PLAN:  59F w/ RRMS. Over the past year it seems like she's gotten slowly worse. This fits a clinical piture of secondary progression, for which we don't have a lot of good options. We spent time discussing the ACTH trial and she'll let us know if she's interested in that.\"       MEDICATIONS: Detrol,Aimvig, effexor, inderal, buspar.     Allergies:   Allergies   Allergen Reactions     Bactrim [Sulfamethoxazole W/Trimethoprim]      Internal bleeding     Copaxone [Glatiramer] Hives       ASSESSMENT       patient with newly worsening mobility, leg pain at 7/10, dizziness, blurry distance vision, no field cut,headaches and concerned that her RRMS may need modifying disease therapy at this point( recent stressors and family emergency have exacerbated her symptoms since 7/16/2019 visit).  Rapid escalation of any symptoms consider ED, she understands. Writer will contact Neurology now:WILFRED on " priority line and sent this as high priority also    RECOMMENDATION/PLAN                                                      RECOMMENDED DISPOSITION:  Phone Visit  Will comply with recommendation: Yes    If further questions/concerns or if symptoms do not improve, worsen or new symptoms develop, call your PCP or [phone] to talk with the Resident on call, as soon as possible.    Guideline used: p. 420 neurologic symptoms.  Telephone Triage Protocols for Nurses, Fifth Edition, Catarina Vale RN

## 2019-08-12 NOTE — TELEPHONE ENCOUNTER
I tried calling the patient, however, she did not answer; I am unsure how detailed I can be on her voicemail; I left [facility] for her advising that Dr. Quiles would like her to make an appointment with our Nurse Practitioner, Kia Flores, for an evaluation.    Maggie Francois, MS RN Care Coordinator

## 2019-08-15 ENCOUNTER — OFFICE VISIT (OUTPATIENT)
Dept: NEUROLOGY | Facility: CLINIC | Age: 59
End: 2019-08-15
Attending: NURSE PRACTITIONER
Payer: COMMERCIAL

## 2019-08-15 VITALS
HEIGHT: 62 IN | HEART RATE: 82 BPM | OXYGEN SATURATION: 97 % | BODY MASS INDEX: 30.6 KG/M2 | SYSTOLIC BLOOD PRESSURE: 135 MMHG | DIASTOLIC BLOOD PRESSURE: 97 MMHG | RESPIRATION RATE: 16 BRPM | WEIGHT: 166.3 LBS

## 2019-08-15 DIAGNOSIS — G35 MS (MULTIPLE SCLEROSIS) (H): ICD-10-CM

## 2019-08-15 DIAGNOSIS — E55.9 VITAMIN D DEFICIENCY: ICD-10-CM

## 2019-08-15 DIAGNOSIS — E55.9 VITAMIN D DEFICIENCY: Primary | ICD-10-CM

## 2019-08-15 LAB
ALBUMIN UR-MCNC: NEGATIVE MG/DL
APPEARANCE UR: ABNORMAL
BACTERIA #/AREA URNS HPF: ABNORMAL /HPF
BILIRUB UR QL STRIP: NEGATIVE
COLOR UR AUTO: YELLOW
DEPRECATED CALCIDIOL+CALCIFEROL SERPL-MC: 62 UG/L (ref 20–75)
GLUCOSE UR STRIP-MCNC: NEGATIVE MG/DL
HGB UR QL STRIP: NEGATIVE
KETONES UR STRIP-MCNC: NEGATIVE MG/DL
LEUKOCYTE ESTERASE UR QL STRIP: NEGATIVE
NITRATE UR QL: NEGATIVE
PH UR STRIP: 9 PH (ref 5–7)
RBC #/AREA URNS AUTO: 2 /HPF (ref 0–2)
SOURCE: ABNORMAL
SP GR UR STRIP: 1.01 (ref 1–1.03)
SQUAMOUS #/AREA URNS AUTO: <1 /HPF (ref 0–1)
UROBILINOGEN UR STRIP-MCNC: 0 MG/DL (ref 0–2)
WBC #/AREA URNS AUTO: 1 /HPF (ref 0–5)

## 2019-08-15 PROCEDURE — 36415 COLL VENOUS BLD VENIPUNCTURE: CPT | Performed by: NURSE PRACTITIONER

## 2019-08-15 PROCEDURE — G0463 HOSPITAL OUTPT CLINIC VISIT: HCPCS | Mod: ZF

## 2019-08-15 PROCEDURE — 81001 URINALYSIS AUTO W/SCOPE: CPT | Performed by: NURSE PRACTITIONER

## 2019-08-15 PROCEDURE — 82306 VITAMIN D 25 HYDROXY: CPT | Performed by: NURSE PRACTITIONER

## 2019-08-15 ASSESSMENT — MIFFLIN-ST. JEOR: SCORE: 1292.58

## 2019-08-15 ASSESSMENT — PAIN SCALES - GENERAL: PAINLEVEL: MODERATE PAIN (5)

## 2019-08-15 NOTE — LETTER
"8/15/2019       RE: Shaneka Alvarez  [address] [facility] [address]     Dear Colleague,    Thank you for referring your patient, Shaneka Alvarez, to the [facility] MULTIPLE SCLEROSIS at [facility]. Please see a copy of my visit note below.    [facility] OUTPATIENT MULTIPLE SCLEROSIS CLINIC VISIT NOTE      REASON FOR VISIT: Shaneka is a 59 year old right handed female who presents to the clinic today with \"walking difficulties and blurry vision\". She was most recently seen by Dr. Quiles on 7/16/2019. She presents to the evaluation alone. Patient was very upset and frustrated at the beginning of this encounter since she had a \"hard time scheduling this visit and then had to wait for a while\" before she actually got roomed. She wanted to leave the room soon after I came to the room, but I was able to talk to her and she finally agreed to complete the visit.      HISTORY OF PRESENT ILLNESS: Initial symptoms in her 20's when she had occasional double vision and sensory symptoms that would last for a few days to weeks. She was diagnosed with Multiple Sclerosis in 2004 soon after she was diagnosed with breast cancer and was undergoing chemo and radiation treatments. She had MRI and abnormal LP consistent with demyelinating disease. Previously treated with GA and Rebif up until 2014. She established care with Dr. Quiles in 2015. Due to her radiological stability, no immunotherapy was recommended at that time. Her brain MRIs has been stable from 2015- 7/2018.     INTERVAL HISTORY SINCE LAST VISIT: Most recent visit with Dr. Quiles on 7/16/2018.  Today patient reports  \"walking difficulties and blurry vision\" for the past few weeks.  In regards to her walking, she have been using a cane on and off for the past few years, but for the past 2 weeks she uses a cane with ambulation all times due to worsening balance and right foot drop.  She fell twice during the past " week.  In regards to her blurry vision, reportedly this started a week ago.  Patient also reports some right eye pain and color desaturation.  No double vision.  No new symptoms with her speech, swallowing, hearing.    She is right-handed.  Her right-sided extremities are weaker than the left.  Also reports constant numbness and tingling in her fingertips and toes, right more than the left.  Moderate to severe issues with her balance.  2 falls within the past week.  As noted above, she currently uses a cane with ambulation.    Patient reports that she has been going through significant amount of stress lately.  She had a family emergency recently which she did not elaborate on.  She also found out that the breast implants they used after her double mastectomy is currently recalled due to a risk for lymphoma. She met with her surgeons yesterday and they are planning a surgery in September. She currently uses Effexor to 25 mg daily and BuSpar 15 mg daily.    Her fatigue is rated as moderate.  She is not on any medication.  She sleeps 8 hours at night.  She reports spasticity in her legs more noticeable at night.    Patient has neurogenic bladder with urinary urgency, intermittent incontinence.  She denies any hesitancy.  She currently takes Detrol LA 4 mg daily.  Denies any burning with micturition or lower abdominal pain.  She has not had a UTI in many years.  Some urgency with bowel movements.    ACTH trial was discussed with the patient during her last visit with Dr. Quiles, but today patient tells me that she talked to the RN care coordinator and is not interested to proceed with this trial.    PAST MEDICAL/SURGICAL HISTORY:   1.  MS  2.  Migraine  3.  Hypertension  4.  Breast cancer status post double mastectomy and reconstructive surgery  5.  Partial hysterectomy  6.  Cholecystectomy  7.  Kidney stone    FAMILY HISTORY: Negative for multiple sclerosis.    SOCIAL HISTORY: She is  with 3 adult children.   "She works full-time as a .  She denies smoking and recreational drug use.  Reports occasional alcohol use.    7/2018  MRI Brain:  Impression:   1. The study demonstrates 10 foci of T2-hyperintensity within the  cerebral white matter consistent with the clinical suspicion of  demyelinating disease. There are no foci of abnormal enhancement noted  intracranially .   2. There is no significant interval change from the prior study.          Vit D: 2000 international unit(s) daily.     PHYSICAL EXAMINATION:   VITAL SIGNS: BP (!) 135/97 (BP Location: Left arm, Patient Position: Chair, Cuff Size: Adult Regular)   Pulse 82   Resp 16   Ht 1.575 m (5' 2\")   Wt 75.4 kg (166 lb 4.8 oz)   SpO2 97%   BMI 30.42 kg/m      MENTAL STATUS: Alert,awake and oriented times four.   CRANIAL NERVES:  Visual fields are full to confrontation. The pupils are equal, round and react to light and there is no Stephen Rebeca pupil. Funduscopic examination demonstrates no optic pallor, papilledema or retinal hemorrhage/exudate. Subjective report of double vision on left lateral gaze. Also reports some color desaturation (formal testing not done). No nystagmus. Facial strength normal. Hearing is normal. Palate elevation and tongue protrusion are normal.   POWER: Strength is as follows in the following muscles/groups (Right/Left,MRC scale): Shoulder abduction 4-/5, elbow flexion 4+/5, elbow extension 4+/5, wrist extension 4-/5, digit extension 4-/5, digit flexion 4-/5, Hip flexion 4-/5, knee extension 4-/5, knee flexion 4-/5, foot dorsiflexion 4-/5.   SENSORY: Subjective report of reduced sensation in left V1 distribuition.   REFLEXES: Reflexes are normal, present and symmetric at biceps, triceps, brachioradialis, knees and ankles.   MOTOR/CEREBELLAR: There are no tremors, myoclonus or other abnormal movements. Tone is within normal limits in the limbs. There is no appendicular ataxia on finger-to-nose testing and rapid " alternating movements are normal in the extremities except slower in right foot. There is no pronator drift.   GAIT: Gait is wide based and she ambulates with a cane.     ASSESSMENT/ PLAN:   1. Multiple Sclerosis, on no immunotherapy: Initial symptoms in her 20s, officially diagnosed with MS in 2004.  Previously treated with a glatiramer acetate and Rebif up until 2014.  Established care with Dr. Quiles in 2015.  Due to ongoing radiological stability, patient was managed with surveillance MRI and no immunotherapy.  Today patient presents with worsening walking difficulties and some vision issues.  See exam findings above.    We will check her urine to rule out an infection.  Due to diplopia and color desaturation, I ordered an MRI brain and orbits with and without contrast to rule out new lesions.  I will contact the patient when I have the results available. If both negative/ stable, patient to follow up with Dr. Quiles in November as scheduled.     2.  Walking difficulty and frequent falls: Discussed physical therapy at [facility] and patient is interested.  Referral was made and contact information was provided to the patient to call and schedule this appointment.    3.  Vitamin D level: Patient currently takes 2000 international units daily.  She has a history of kidney stone.  No recent vitamin D level in epic.  We will check this level today and I will contact the patient if the dose changes recommended.    4. Please contact us with questions or concerns.     Kia Flores CNP  [facility] Neu[facility]    I spent 80 minutes with this patient today, greater than 50% of which was spent in counseling and coordination of care.     (Chart documentation was completed in part with Dragon voice-recognition software. Even though reviewed, some grammatical, spelling, and word errors may remain.)

## 2019-08-15 NOTE — PROGRESS NOTES
"[facility] OUTPATIENT MULTIPLE SCLEROSIS CLINIC VISIT NOTE      REASON FOR VISIT: Shaneka is a 59 year old right handed female who presents to the clinic today with \"walking difficulties and blurry vision\". She was most recently seen by Dr. Quiles on 7/16/2019. She presents to the evaluation alone. Patient was very upset and frustrated at the beginning of this encounter since she had a \"hard time scheduling this visit and then had to wait for a while\" before she actually got roomed. She wanted to leave the room soon after I came to the room, but I was able to talk to her and she finally agreed to complete the visit.      HISTORY OF PRESENT ILLNESS: Initial symptoms in her 20's when she had occasional double vision and sensory symptoms that would last for a few days to weeks. She was diagnosed with Multiple Sclerosis in 2004 soon after she was diagnosed with breast cancer and was undergoing chemo and radiation treatments. She had MRI and abnormal LP consistent with demyelinating disease. Previously treated with GA and Rebif up until 2014. She established care with Dr. Quiles in 2015. Due to her radiological stability, no immunotherapy was recommended at that time. Her brain MRIs has been stable from 2015- 7/2018.     INTERVAL HISTORY SINCE LAST VISIT: Most recent visit with Dr. Quiles on 7/16/2018.  Today patient reports  \"walking difficulties and blurry vision\" for the past few weeks.  In regards to her walking, she have been using a cane on and off for the past few years, but for the past 2 weeks she uses a cane with ambulation all times due to worsening balance and right foot drop.  She fell twice during the past week.  In regards to her blurry vision, reportedly this started a week ago.  Patient also reports some right eye pain and color desaturation.  No double vision.  No new symptoms with her speech, swallowing, hearing.    She is right-handed.  Her right-sided extremities are weaker than " the left.  Also reports constant numbness and tingling in her fingertips and toes, right more than the left.  Moderate to severe issues with her balance.  2 falls within the past week.  As noted above, she currently uses a cane with ambulation.    Patient reports that she has been going through significant amount of stress lately.  She had a family emergency recently which she did not elaborate on.  She also found out that the breast implants they used after her double mastectomy is currently recalled due to a risk for lymphoma. She met with her surgeons yesterday and they are planning a surgery in September. She currently uses Effexor to 25 mg daily and BuSpar 15 mg daily.    Her fatigue is rated as moderate.  She is not on any medication.  She sleeps 8 hours at night.  She reports spasticity in her legs more noticeable at night.    Patient has neurogenic bladder with urinary urgency, intermittent incontinence.  She denies any hesitancy.  She currently takes Detrol LA 4 mg daily.  Denies any burning with micturition or lower abdominal pain.  She has not had a UTI in many years.  Some urgency with bowel movements.    ACTH trial was discussed with the patient during her last visit with Dr. Quiles, but today patient tells me that she talked to the RN care coordinator and is not interested to proceed with this trial.      PAST MEDICAL/SURGICAL HISTORY:   1.  MS  2.  Migraine  3.  Hypertension  4.  Breast cancer status post double mastectomy and reconstructive surgery  5.  Partial hysterectomy  6.  Cholecystectomy  7.  Kidney stone    FAMILY HISTORY: Negative for multiple sclerosis.    SOCIAL HISTORY: She is  with 3 adult children.  She works full-time as a .  She denies smoking and recreational drug use.  Reports occasional alcohol use.    7/2018  MRI Brain:  Impression:   1. The study demonstrates 10 foci of T2-hyperintensity within the  cerebral white matter consistent with the clinical  "suspicion of  demyelinating disease. There are no foci of abnormal enhancement noted  intracranially .   2. There is no significant interval change from the prior study.          Vit D: 2000 international unit(s) daily.     PHYSICAL EXAMINATION:   VITAL SIGNS: BP (!) 135/97 (BP Location: Left arm, Patient Position: Chair, Cuff Size: Adult Regular)   Pulse 82   Resp 16   Ht 1.575 m (5' 2\")   Wt 75.4 kg (166 lb 4.8 oz)   SpO2 97%   BMI 30.42 kg/m     MENTAL STATUS: Alert,awake and oriented times four.   CRANIAL NERVES:  Visual fields are full to confrontation. The pupils are equal, round and react to light and there is no Stephen Rebeca pupil. Funduscopic examination demonstrates no optic pallor, papilledema or retinal hemorrhage/exudate. Subjective report of double vision on left lateral gaze. Also reports some color desaturation (formal testing not done). No nystagmus. Facial strength normal. Hearing is normal. Palate elevation and tongue protrusion are normal.   POWER: Strength is as follows in the following muscles/groups (Right/Left,MRC scale): Shoulder abduction 4-/5, elbow flexion 4+/5, elbow extension 4+/5, wrist extension 4-/5, digit extension 4-/5, digit flexion 4-/5, Hip flexion 4-/5, knee extension 4-/5, knee flexion 4-/5, foot dorsiflexion 4-/5.   SENSORY: Subjective report of reduced sensation in left V1 distribuition.   REFLEXES: Reflexes are normal, present and symmetric at biceps, triceps, brachioradialis, knees and ankles.   MOTOR/CEREBELLAR: There are no tremors, myoclonus or other abnormal movements. Tone is within normal limits in the limbs. There is no appendicular ataxia on finger-to-nose testing and rapid alternating movements are normal in the extremities except slower in right foot. There is no pronator drift.   GAIT: Gait is wide based and she ambulates with a cane.     ASSESSMENT/ PLAN:   1. Multiple Sclerosis, on no immunotherapy: Initial symptoms in her 20s, officially diagnosed with " MS in 2004.  Previously treated with a glatiramer acetate and Rebif up until 2014.  Established care with Dr. Quiles in 2015.  Due to ongoing radiological stability, patient was managed with surveillance MRI and no immunotherapy.  Today patient presents with worsening walking difficulties and some vision issues.  See exam findings above.    We will check her urine to rule out an infection.  Due to diplopia and color desaturation, I ordered an MRI brain and orbits with and without contrast to rule out new lesions.  I will contact the patient when I have the results available. If both negative/ stable, patient to follow up with Dr. Quiles in November as scheduled.     2.  Walking difficulty and frequent falls: Discussed physical therapy at [facility] and patient is interested.  Referral was made and contact information was provided to the patient to call and schedule this appointment.    3.  Vitamin D level: Patient currently takes 2000 international units daily.  She has a history of kidney stone.  No recent vitamin D level in epic.  We will check this level today and I will contact the patient if the dose changes recommended.    4. Please contact us with questions or concerns.     Kia Flores CNP  [facility] Neurology          I spent 80 minutes with this patient today, greater than 50% of which was spent in counseling and coordination of care.     (Chart documentation was completed in part with Dragon voice-recognition software. Even though reviewed, some grammatical, spelling, and word errors may remain.)

## 2019-08-15 NOTE — PATIENT INSTRUCTIONS
1. Check urine today to rule out an infection.     2. MRI Brain and Orbits with and without contrast in 2 weeks.     3. Start PT at [facility]. An order has been placed.      [facility]       [address], [place] [address]       Phone: [phone]     4. Check vit D level today.     5. F/u with Dr. Quiles as scheduled.

## 2019-08-21 ENCOUNTER — ANCILLARY PROCEDURE (OUTPATIENT)
Dept: MRI IMAGING | Facility: CLINIC | Age: 59
End: 2019-08-21
Attending: NURSE PRACTITIONER
Payer: COMMERCIAL

## 2019-08-21 DIAGNOSIS — G35 MS (MULTIPLE SCLEROSIS) (H): ICD-10-CM

## 2019-08-21 RX ORDER — GADOBUTROL 604.72 MG/ML
7.5 INJECTION INTRAVENOUS ONCE
Status: COMPLETED | OUTPATIENT
Start: 2019-08-21 | End: 2019-08-21

## 2019-08-21 RX ADMIN — GADOBUTROL 7.5 ML: 604.72 INJECTION INTRAVENOUS at 18:56

## 2019-08-22 ENCOUNTER — TELEPHONE (OUTPATIENT)
Dept: ONCOLOGY | Facility: CLINIC | Age: 59
End: 2019-08-22

## 2019-08-22 DIAGNOSIS — Z80.3 FAMILY HISTORY OF MALIGNANT NEOPLASM OF BREAST: Primary | ICD-10-CM

## 2019-08-22 DIAGNOSIS — R90.89 ABNORMAL BRAIN MRI: ICD-10-CM

## 2019-08-22 NOTE — DISCHARGE INSTRUCTIONS
MRI Contrast Discharge Instructions    The IV contrast you received today will pass out of your body in your  urine. This will happen in the next 24 hours. You will not feel this process.  Your urine will not change color.    Drink at least 4 extra glasses of water or juice today (unless your doctor  has restricted your fluids). This reduces the stress on your kidneys.  You may take your regular medicines.    If you are on dialysis: It is best to have dialysis today.    If you have a reaction: Most reactions happen right away. If you have  any new symptoms after leaving the hospital (such as hives or swelling),  call your hospital at the correct number below. Or call your family doctor.  If you have breathing distress or wheezing, call 911.    Special instructions: ***    I have read and understand the above information.    Signature:______________________________________ Date:___________    Staff:__________________________________________ Date:___________     Time:__________    [place] Radiology Departments:    ___[place]: [phone]  ___[facility]: [phone]  ___[place]: [phone] ___[facility]: [phone]  ___[facility]: [phone]  ___[facility]: [phone]  ___[phone]  ___[facility]: [phone]  ___[phone]

## 2019-08-22 NOTE — TELEPHONE ENCOUNTER
"----- Message from Krystian Urias RN sent at 8/22/2019  2:05 PM CDT -----  Annie would you be able to advise on this?      Also please see visit notes from 7/11/18 appt with Dr. Amaya.      Thank you,  Krystian  ----- Message -----  From: Kia Flores APRN CNP  Sent: 8/22/2019   1:31 PM  To: Stephany Amaya MD    Hi,    I saw this patient in MS clinic recently and a follow up Brain MRI was done to assess stability of her MS. From an MS perspective, she do not have any new or enhancing lesions. But she has \"Multiple enhancing calvarial lesions (a similar single lesion in C2  vertebral body), new since 7/10/2018 and consistent metastatic  Disease\".    How would you want to proceed? Should she schedule a follow up with you to discuss this finding? I can call her and let her know that the MS is stable, but I am not comfortable explaining the metastatic lesions.     Best,  Kia    "

## 2019-08-22 NOTE — TELEPHONE ENCOUNTER
This provider phoned pt just now and let her know that there are concerns on her recent scan of the brain that leads her back to oncology. I shared we would like her to come in and talk with us about the findings and she guessed that we have suspicion of cancer and would like to get in to oncology as soon as possible- preferred not to see previous oncology provider. Also stated that this it may take some time to get her in and wondered if it would be okay to set up some imaging prior to her visit here and she was open to this.   Suggest PET CT is indicated and will order for abnormal brain MRI( consistent with metastatic disease)  History of breast cancer)   We will have scheduling set up with Dr JOE or Dr Zepeda.   Annie Bailon,CNP

## 2019-08-22 NOTE — DISCHARGE INSTRUCTIONS
MRI Contrast Discharge Instructions    The IV contrast you received today will pass out of your body in your  urine. This will happen in the next 24 hours. You will not feel this process.  Your urine will not change color.    Drink at least 4 extra glasses of water or juice today (unless your doctor  has restricted your fluids). This reduces the stress on your kidneys.  You may take your regular medicines.    If you are on dialysis: It is best to have dialysis today.    If you have a reaction: Most reactions happen right away. If you have  any new symptoms after leaving the hospital (such as hives or swelling),  call your hospital at the correct number below. Or call your family doctor.  If you have breathing distress or wheezing, call 911.    Special instructions: ***    I have read and understand the above information.    Signature:______________________________________ Date:___________    Staff:__________________________________________ Date:___________     Time:__________    [place] Radiology Departments:    ___[place]: [phone]  ___[facility]: [phone]  ___[place]: [phone] ___[facility]: [phone]  ___[facility]: [phone]  ___[facility]: [phone]  ___[phone]  ___[facility]: [phone]  ___[phone]

## 2019-08-26 ENCOUNTER — TELEPHONE (OUTPATIENT)
Dept: NEUROLOGY | Facility: CLINIC | Age: 59
End: 2019-08-26

## 2019-08-26 NOTE — TELEPHONE ENCOUNTER
"I called the patient to update her about the recent MRI results, but did not get a hold of her.     Patient had a brain and orbit MRI recently for new visual symptoms. From an MS standpoint, her MRI remains stable without any new or active lesions.     Unfortunately her brain MRI shows \"Multiple enhancing calvarial lesions (a similar single lesion in C2  vertebral body), new since 7/10/2018 and consistent metastatic disease\". Patient has a h/o breast cancer which was diagnosed in 2006 and she underwent chemotherapy, radiation and bilateral mastectomy with breast reconstruction.     I reached out to the oncology provider regarding this. Patient's most recent visit with Oncology in 7/2018. GUTIERREZ Balderas called the patient about this results. Please see documentation dated 8/22/2019. Patient is scheduled for a PET scan on 8/30.   "

## 2019-08-30 ENCOUNTER — HOSPITAL ENCOUNTER (INPATIENT)
Facility: CLINIC | Age: 59
LOS: 7 days | Discharge: HOME | DRG: 478 | End: 2019-09-06
Attending: EMERGENCY MEDICINE | Admitting: INTERNAL MEDICINE
Payer: COMMERCIAL

## 2019-08-30 ENCOUNTER — TELEPHONE (OUTPATIENT)
Dept: ONCOLOGY | Facility: CLINIC | Age: 59
End: 2019-08-30

## 2019-08-30 ENCOUNTER — ANCILLARY PROCEDURE (OUTPATIENT)
Dept: PET IMAGING | Facility: CLINIC | Age: 59
End: 2019-08-30
Attending: NURSE PRACTITIONER
Payer: COMMERCIAL

## 2019-08-30 DIAGNOSIS — N31.9 NEUROGENIC BLADDER: ICD-10-CM

## 2019-08-30 DIAGNOSIS — Z80.3 FAMILY HISTORY OF MALIGNANT NEOPLASM OF BREAST: ICD-10-CM

## 2019-08-30 DIAGNOSIS — G35 MS (MULTIPLE SCLEROSIS) (H): ICD-10-CM

## 2019-08-30 DIAGNOSIS — R90.89 ABNORMAL BRAIN MRI: ICD-10-CM

## 2019-08-30 DIAGNOSIS — C50.919 CARCINOMA OF BREAST METASTATIC TO BONE, UNSPECIFIED LATERALITY (H): Primary | ICD-10-CM

## 2019-08-30 DIAGNOSIS — C79.9 METASTATIC DISEASE (H): ICD-10-CM

## 2019-08-30 DIAGNOSIS — C79.51 CARCINOMA OF BREAST METASTATIC TO BONE, UNSPECIFIED LATERALITY (H): Primary | ICD-10-CM

## 2019-08-30 DIAGNOSIS — M48.50XD PATHOLOGICAL COMPRESSION FRACTURE OF VERTEBRA WITH ROUTINE HEALING, SUBSEQUENT ENCOUNTER: ICD-10-CM

## 2019-08-30 DIAGNOSIS — M54.50 LOW BACK PAIN WITHOUT SCIATICA, UNSPECIFIED BACK PAIN LATERALITY, UNSPECIFIED CHRONICITY: ICD-10-CM

## 2019-08-30 LAB
ANION GAP SERPL CALCULATED.3IONS-SCNC: 7 MMOL/L (ref 3–14)
BASOPHILS # BLD AUTO: 0 10E9/L (ref 0–0.2)
BASOPHILS NFR BLD AUTO: 0.9 %
BUN SERPL-MCNC: 9 MG/DL (ref 7–30)
CALCIUM SERPL-MCNC: 8.8 MG/DL (ref 8.5–10.1)
CHLORIDE SERPL-SCNC: 100 MMOL/L (ref 94–109)
CO2 SERPL-SCNC: 26 MMOL/L (ref 20–32)
CREAT SERPL-MCNC: 0.79 MG/DL (ref 0.52–1.04)
DIFFERENTIAL METHOD BLD: NORMAL
EOSINOPHIL # BLD AUTO: 0.2 10E9/L (ref 0–0.7)
EOSINOPHIL NFR BLD AUTO: 3.2 %
ERYTHROCYTE [DISTWIDTH] IN BLOOD BY AUTOMATED COUNT: 12.7 % (ref 10–15)
GFR SERPL CREATININE-BSD FRML MDRD: 83 ML/MIN/{1.73_M2}
GLUCOSE SERPL-MCNC: 92 MG/DL (ref 70–99)
HCT VFR BLD AUTO: 43 % (ref 35–47)
HGB BLD-MCNC: 14 G/DL (ref 11.7–15.7)
IMM GRANULOCYTES # BLD: 0 10E9/L (ref 0–0.4)
IMM GRANULOCYTES NFR BLD: 0.4 %
LYMPHOCYTES # BLD AUTO: 1.2 10E9/L (ref 0.8–5.3)
LYMPHOCYTES NFR BLD AUTO: 26.8 %
MCH RBC QN AUTO: 28.3 PG (ref 26.5–33)
MCHC RBC AUTO-ENTMCNC: 32.6 G/DL (ref 31.5–36.5)
MCV RBC AUTO: 87 FL (ref 78–100)
MONOCYTES # BLD AUTO: 0.6 10E9/L (ref 0–1.3)
MONOCYTES NFR BLD AUTO: 12.6 %
NEUTROPHILS # BLD AUTO: 2.6 10E9/L (ref 1.6–8.3)
NEUTROPHILS NFR BLD AUTO: 56.1 %
NRBC # BLD AUTO: 0 10*3/UL
NRBC BLD AUTO-RTO: 0 /100
PLATELET # BLD AUTO: 202 10E9/L (ref 150–450)
POTASSIUM SERPL-SCNC: 3.6 MMOL/L (ref 3.4–5.3)
RBC # BLD AUTO: 4.94 10E12/L (ref 3.8–5.2)
SODIUM SERPL-SCNC: 134 MMOL/L (ref 133–144)
WBC # BLD AUTO: 4.6 10E9/L (ref 4–11)

## 2019-08-30 PROCEDURE — 78816 PET IMAGE W/CT FULL BODY: CPT | Mod: PS | Performed by: RADIOLOGY

## 2019-08-30 PROCEDURE — 99285 EMERGENCY DEPT VISIT HI MDM: CPT | Mod: Z6 | Performed by: EMERGENCY MEDICINE

## 2019-08-30 PROCEDURE — 12000001 ZZH R&B MED SURG/OB UMMC

## 2019-08-30 PROCEDURE — 71260 CT THORAX DX C+: CPT | Mod: 59

## 2019-08-30 PROCEDURE — 25000132 ZZH RX MED GY IP 250 OP 250 PS 637: Performed by: EMERGENCY MEDICINE

## 2019-08-30 PROCEDURE — 74177 CT ABD & PELVIS W/CONTRAST: CPT | Mod: 59

## 2019-08-30 PROCEDURE — 99285 EMERGENCY DEPT VISIT HI MDM: CPT | Mod: 25 | Performed by: EMERGENCY MEDICINE

## 2019-08-30 PROCEDURE — 80048 BASIC METABOLIC PNL TOTAL CA: CPT | Performed by: EMERGENCY MEDICINE

## 2019-08-30 PROCEDURE — A9552 F18 FDG: HCPCS | Performed by: NURSE PRACTITIONER

## 2019-08-30 PROCEDURE — 85025 COMPLETE CBC W/AUTO DIFF WBC: CPT | Performed by: EMERGENCY MEDICINE

## 2019-08-30 PROCEDURE — 85610 PROTHROMBIN TIME: CPT | Performed by: EMERGENCY MEDICINE

## 2019-08-30 RX ORDER — HEPARIN SODIUM,PORCINE 10 UNIT/ML
5-10 VIAL (ML) INTRAVENOUS
Status: DISCONTINUED | OUTPATIENT
Start: 2019-08-30 | End: 2019-09-06 | Stop reason: HOSPADM

## 2019-08-30 RX ORDER — HEPARIN SODIUM (PORCINE) LOCK FLUSH IV SOLN 100 UNIT/ML 100 UNIT/ML
5 SOLUTION INTRAVENOUS
Status: DISCONTINUED | OUTPATIENT
Start: 2019-08-30 | End: 2019-09-06 | Stop reason: HOSPADM

## 2019-08-30 RX ORDER — OXYCODONE HYDROCHLORIDE 5 MG/1
5 TABLET ORAL ONCE
Status: COMPLETED | OUTPATIENT
Start: 2019-08-30 | End: 2019-08-30

## 2019-08-30 RX ORDER — IOPAMIDOL 755 MG/ML
100 INJECTION, SOLUTION INTRAVASCULAR ONCE
Status: DISCONTINUED | OUTPATIENT
Start: 2019-08-30 | End: 2019-08-30 | Stop reason: CLARIF

## 2019-08-30 RX ORDER — HYDROCODONE BITARTRATE AND ACETAMINOPHEN 5; 325 MG/1; MG/1
1 TABLET ORAL ONCE
Status: COMPLETED | OUTPATIENT
Start: 2019-08-30 | End: 2019-08-30

## 2019-08-30 RX ORDER — HEPARIN SODIUM,PORCINE 10 UNIT/ML
5-10 VIAL (ML) INTRAVENOUS EVERY 24 HOURS
Status: DISCONTINUED | OUTPATIENT
Start: 2019-08-30 | End: 2019-09-06 | Stop reason: HOSPADM

## 2019-08-30 RX ADMIN — OXYCODONE HYDROCHLORIDE 5 MG: 5 TABLET ORAL at 22:02

## 2019-08-30 RX ADMIN — HYDROCODONE BITARTRATE AND ACETAMINOPHEN 1 TABLET: 5; 325 TABLET ORAL at 21:06

## 2019-08-30 ASSESSMENT — ENCOUNTER SYMPTOMS: BACK PAIN: 1

## 2019-08-30 ASSESSMENT — MIFFLIN-ST. JEOR: SCORE: 1283.96

## 2019-08-30 NOTE — TELEPHONE ENCOUNTER
I received a call from radiology about an urgent finding on today's PET/CT. There are 2 impending pathologic fractures: L1 and L4. Report not up yet. Shaneka is a patient with a history of localized ER/GA+ HER2- breast cancer diagnosed >10 years ago. Stage IIB, T2, N1 M0.     I called her. She has had back pain for a year and it has been getting progressively worse to the point it is dehabilitating. She is laying flat on her couch because movement causes 7/10 pain. She has no pain medications at home, she has been using aleve without relief. I told her that I thought the findings likely represented metastatic breast cancer. She was relieved for a reason for her pain and just wanted to move forward as quickly as possible. I advised she go to the [facility] ED for pain control, neurosurgery and/or radiation oncology consults to see if she needs something done to stabilize her spine vs radiation. I told her she will likely need an MRI and eventually a tissue biopsy to confirm diagnosis and get ER/GA/HER 2 status. She is currently in the process of switching her oncologist. She has an appt with Dr. Zepeda 9/18 at [facility]. Patient expressed understanding and will have her  drive her to the ED.     I called and gave the ED report as well as heads up to Dr. Mejia who is on oncology service.     Amee Dia PA-C

## 2019-08-30 NOTE — ED TRIAGE NOTES
"Triage Assessment & Note:    BP (!) 171/108   Pulse 79   Temp 97.5  F (36.4  C) (Oral)   Resp 18   Ht 1.575 m (5' 2\")   Wt 74.6 kg (164 lb 6.4 oz)   SpO2 100%   BMI 30.07 kg/m      Patient presents with: PT was advised to come to ED by Oncology MD due to new mets to spine. PT reports increased back pain over the last week.     Home Treatments/Remedies: none     Febrile / Afebrile? Afebrile     Duration of C/o: More than 1 year    Caio Castellano RN  August 30, 2019      "

## 2019-08-30 NOTE — LETTER
Transition Communication Hand-off for Care Transitions to Next Level of Care Provider    Name: Shaneka Alvarez  : 1960  MRN #: [id number]  Primary Care Provider: Mohit Barcenas     Primary Clinic: [address] [address]     Reason for Hospitalization:  Low back pain without sciatica, unspecified back pain laterality, unspecified chronicity [M54.5]  Admit Date/Time: 2019  7:37 PM  Discharge Date: 19  Payor Source: Payor: [employer] / Plan: [employer] OPEN ACCESS / Product Type: HMO /     Shaneka Alvarez is a 59 year old female with history of multiple sclerosis which is stable, T2 N1 M0 invasive lobular carcinoma of the right breast that was ER+/MA+ HER-2 negative s/p bilateral mastectomies, admitted after recent findings of 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    Discharge Plan: Home with OP radiation and clinic follow-up as recommended.     Discharge Needs Assessment:  Needs      Most Recent Value   Equipment Currently Used at Home  cane, straight, grab bar, tub/shower, walker, rolling        Follow-up plan:    Future Appointments   Date Time Provider Department Center   2019  6:00 AM Romario Gamble PT [facility]   2019  1:30 PM [facility]   2019 10:30 AM [facility]   9/10/2019  2:30 PM [facility]   9/10/2019  2:45 PM Desi Keller MD [facility]   2019  2:00 PM [facility]   2019  2:00 PM [facility]   2019  2:00 PM [facility]   2019  9:00 AM [facility]   2019  1:30 PM [facility]   2019  1:45 PM Desi Keller MD [facility]   2019  1:30 PM [facility]   2019  3:45 PM Dewayne Zepeda MD [facility]  [place]   12/10/2019 11:30 AM Caio Quiles MD [facility]       Any outstanding tests or procedures:        Radiology & Cardiology Orders     Future Labs/Procedures Complete By Expires    XR Spine Complete Scoliosis 2 Views  9/19/2019 (Approximate) 9/5/2020            Nannette Hernandez RN, BSN, PHN  Care Coordinator       AVS/Discharge Summary is the source of truth; this is a helpful guide for improved communication of patient story

## 2019-08-31 ENCOUNTER — APPOINTMENT (OUTPATIENT)
Dept: MRI IMAGING | Facility: CLINIC | Age: 59
DRG: 478 | End: 2019-08-31
Attending: EMERGENCY MEDICINE
Payer: COMMERCIAL

## 2019-08-31 ENCOUNTER — APPOINTMENT (OUTPATIENT)
Dept: CT IMAGING | Facility: CLINIC | Age: 59
DRG: 478 | End: 2019-08-31
Attending: STUDENT IN AN ORGANIZED HEALTH CARE EDUCATION/TRAINING PROGRAM
Payer: COMMERCIAL

## 2019-08-31 PROBLEM — C79.9 METASTATIC DISEASE (H): Status: ACTIVE | Noted: 2019-08-31

## 2019-08-31 LAB
GRAM STN SPEC: NORMAL
INR PPP: 1.09 (ref 0.86–1.14)
Lab: NORMAL
PROCALCITONIN SERPL-MCNC: <0.05 NG/ML
RADIOLOGIST FLAGS: ABNORMAL
RADIOLOGIST FLAGS: ABNORMAL
SPECIMEN SOURCE: NORMAL

## 2019-08-31 PROCEDURE — A9585 GADOBUTROL INJECTION: HCPCS | Performed by: EMERGENCY MEDICINE

## 2019-08-31 PROCEDURE — 87205 SMEAR GRAM STAIN: CPT | Performed by: STUDENT IN AN ORGANIZED HEALTH CARE EDUCATION/TRAINING PROGRAM

## 2019-08-31 PROCEDURE — 25500064 ZZH RX 255 OP 636: Performed by: EMERGENCY MEDICINE

## 2019-08-31 PROCEDURE — 72157 MRI CHEST SPINE W/O & W/DYE: CPT

## 2019-08-31 PROCEDURE — 25000132 ZZH RX MED GY IP 250 OP 250 PS 637: Performed by: INTERNAL MEDICINE

## 2019-08-31 PROCEDURE — 72131 CT LUMBAR SPINE W/O DYE: CPT

## 2019-08-31 PROCEDURE — 25000128 H RX IP 250 OP 636: Performed by: INTERNAL MEDICINE

## 2019-08-31 PROCEDURE — 99232 SBSQ HOSP IP/OBS MODERATE 35: CPT | Mod: GC | Performed by: INTERNAL MEDICINE

## 2019-08-31 PROCEDURE — L0472 TLSO RIGID FRAME HYPEREX PRE: HCPCS

## 2019-08-31 PROCEDURE — 25000128 H RX IP 250 OP 636: Performed by: STUDENT IN AN ORGANIZED HEALTH CARE EDUCATION/TRAINING PROGRAM

## 2019-08-31 PROCEDURE — 87070 CULTURE OTHR SPECIMN AEROBIC: CPT | Performed by: STUDENT IN AN ORGANIZED HEALTH CARE EDUCATION/TRAINING PROGRAM

## 2019-08-31 PROCEDURE — 85610 PROTHROMBIN TIME: CPT | Performed by: STUDENT IN AN ORGANIZED HEALTH CARE EDUCATION/TRAINING PROGRAM

## 2019-08-31 PROCEDURE — 25800030 ZZH RX IP 258 OP 636: Performed by: INTERNAL MEDICINE

## 2019-08-31 PROCEDURE — 84145 PROCALCITONIN (PCT): CPT | Performed by: STUDENT IN AN ORGANIZED HEALTH CARE EDUCATION/TRAINING PROGRAM

## 2019-08-31 PROCEDURE — 25000125 ZZHC RX 250: Performed by: INTERNAL MEDICINE

## 2019-08-31 PROCEDURE — 25000128 H RX IP 250 OP 636: Performed by: EMERGENCY MEDICINE

## 2019-08-31 PROCEDURE — 72158 MRI LUMBAR SPINE W/O & W/DYE: CPT

## 2019-08-31 PROCEDURE — 25000131 ZZH RX MED GY IP 250 OP 636 PS 637: Performed by: INTERNAL MEDICINE

## 2019-08-31 PROCEDURE — 12000001 ZZH R&B MED SURG/OB UMMC

## 2019-08-31 PROCEDURE — 25000132 ZZH RX MED GY IP 250 OP 250 PS 637: Performed by: STUDENT IN AN ORGANIZED HEALTH CARE EDUCATION/TRAINING PROGRAM

## 2019-08-31 RX ORDER — MORPHINE SULFATE 2 MG/ML
1 INJECTION, SOLUTION INTRAMUSCULAR; INTRAVENOUS
Status: DISCONTINUED | OUTPATIENT
Start: 2019-08-31 | End: 2019-08-31

## 2019-08-31 RX ORDER — POTASSIUM CHLORIDE 7.45 MG/ML
10 INJECTION INTRAVENOUS
Status: DISCONTINUED | OUTPATIENT
Start: 2019-08-31 | End: 2019-09-06 | Stop reason: HOSPADM

## 2019-08-31 RX ORDER — BISACODYL 5 MG
15 TABLET, DELAYED RELEASE (ENTERIC COATED) ORAL DAILY PRN
Status: DISCONTINUED | OUTPATIENT
Start: 2019-08-31 | End: 2019-09-06 | Stop reason: HOSPADM

## 2019-08-31 RX ORDER — ONDANSETRON 4 MG/1
4 TABLET, FILM COATED ORAL EVERY 6 HOURS PRN
Status: DISCONTINUED | OUTPATIENT
Start: 2019-08-31 | End: 2019-08-31

## 2019-08-31 RX ORDER — IBUPROFEN 600 MG/1
600 TABLET, FILM COATED ORAL EVERY 6 HOURS PRN
Status: DISCONTINUED | OUTPATIENT
Start: 2019-08-31 | End: 2019-09-06 | Stop reason: HOSPADM

## 2019-08-31 RX ORDER — DEXAMETHASONE SODIUM PHOSPHATE 4 MG/ML
10 INJECTION, SOLUTION INTRA-ARTICULAR; INTRALESIONAL; INTRAMUSCULAR; INTRAVENOUS; SOFT TISSUE ONCE
Status: COMPLETED | OUTPATIENT
Start: 2019-08-31 | End: 2019-08-31

## 2019-08-31 RX ORDER — POTASSIUM CHLORIDE 750 MG/1
20-40 TABLET, EXTENDED RELEASE ORAL
Status: DISCONTINUED | OUTPATIENT
Start: 2019-08-31 | End: 2019-09-06 | Stop reason: HOSPADM

## 2019-08-31 RX ORDER — BUSPIRONE HYDROCHLORIDE 15 MG/1
15 TABLET ORAL 2 TIMES DAILY
Status: DISCONTINUED | OUTPATIENT
Start: 2019-08-31 | End: 2019-09-06 | Stop reason: HOSPADM

## 2019-08-31 RX ORDER — POLYETHYLENE GLYCOL 3350 17 G/17G
17 POWDER, FOR SOLUTION ORAL DAILY PRN
Status: DISCONTINUED | OUTPATIENT
Start: 2019-08-31 | End: 2019-09-02

## 2019-08-31 RX ORDER — TOLTERODINE 4 MG/1
4 CAPSULE, EXTENDED RELEASE ORAL DAILY
Status: DISCONTINUED | OUTPATIENT
Start: 2019-08-31 | End: 2019-09-06 | Stop reason: HOSPADM

## 2019-08-31 RX ORDER — ONDANSETRON 2 MG/ML
8 INJECTION INTRAMUSCULAR; INTRAVENOUS EVERY 6 HOURS PRN
Status: DISCONTINUED | OUTPATIENT
Start: 2019-08-31 | End: 2019-09-06

## 2019-08-31 RX ORDER — PROCHLORPERAZINE MALEATE 10 MG
10 TABLET ORAL EVERY 6 HOURS PRN
Status: DISCONTINUED | OUTPATIENT
Start: 2019-08-31 | End: 2019-09-06

## 2019-08-31 RX ORDER — OXYCODONE HYDROCHLORIDE 5 MG/1
5-10 TABLET ORAL
Status: DISCONTINUED | OUTPATIENT
Start: 2019-08-31 | End: 2019-09-03

## 2019-08-31 RX ORDER — HYDRALAZINE HYDROCHLORIDE 20 MG/ML
10 INJECTION INTRAMUSCULAR; INTRAVENOUS EVERY 4 HOURS PRN
Status: DISCONTINUED | OUTPATIENT
Start: 2019-08-31 | End: 2019-09-06 | Stop reason: HOSPADM

## 2019-08-31 RX ORDER — MAGNESIUM SULFATE HEPTAHYDRATE 40 MG/ML
4 INJECTION, SOLUTION INTRAVENOUS EVERY 4 HOURS PRN
Status: DISCONTINUED | OUTPATIENT
Start: 2019-08-31 | End: 2019-09-06 | Stop reason: HOSPADM

## 2019-08-31 RX ORDER — SCOLOPAMINE TRANSDERMAL SYSTEM 1 MG/1
1 PATCH, EXTENDED RELEASE TRANSDERMAL
Status: DISCONTINUED | OUTPATIENT
Start: 2019-08-31 | End: 2019-09-01

## 2019-08-31 RX ORDER — POTASSIUM CHLORIDE 29.8 MG/ML
20 INJECTION INTRAVENOUS
Status: DISCONTINUED | OUTPATIENT
Start: 2019-08-31 | End: 2019-09-06 | Stop reason: HOSPADM

## 2019-08-31 RX ORDER — MORPHINE SULFATE 2 MG/ML
2-4 INJECTION, SOLUTION INTRAMUSCULAR; INTRAVENOUS
Status: DISCONTINUED | OUTPATIENT
Start: 2019-08-31 | End: 2019-09-06 | Stop reason: HOSPADM

## 2019-08-31 RX ORDER — LORAZEPAM 0.5 MG/1
0.5 TABLET ORAL EVERY 6 HOURS PRN
Status: DISCONTINUED | OUTPATIENT
Start: 2019-08-31 | End: 2019-09-06

## 2019-08-31 RX ORDER — BISACODYL 5 MG
10 TABLET, DELAYED RELEASE (ENTERIC COATED) ORAL DAILY PRN
Status: DISCONTINUED | OUTPATIENT
Start: 2019-08-31 | End: 2019-09-06 | Stop reason: HOSPADM

## 2019-08-31 RX ORDER — PROPRANOLOL HCL 60 MG
60 CAPSULE, EXTENDED RELEASE 24HR ORAL DAILY
Status: DISCONTINUED | OUTPATIENT
Start: 2019-08-31 | End: 2019-09-06 | Stop reason: HOSPADM

## 2019-08-31 RX ORDER — POTASSIUM CL/LIDO/0.9 % NACL 10MEQ/0.1L
10 INTRAVENOUS SOLUTION, PIGGYBACK (ML) INTRAVENOUS
Status: DISCONTINUED | OUTPATIENT
Start: 2019-08-31 | End: 2019-09-06 | Stop reason: HOSPADM

## 2019-08-31 RX ORDER — GADOBUTROL 604.72 MG/ML
7.5 INJECTION INTRAVENOUS ONCE
Status: COMPLETED | OUTPATIENT
Start: 2019-08-31 | End: 2019-08-31

## 2019-08-31 RX ORDER — ACETAMINOPHEN 325 MG/1
650 TABLET ORAL EVERY 4 HOURS PRN
Status: DISCONTINUED | OUTPATIENT
Start: 2019-08-31 | End: 2019-09-03

## 2019-08-31 RX ORDER — VENLAFAXINE HYDROCHLORIDE 75 MG/1
225 CAPSULE, EXTENDED RELEASE ORAL DAILY
Status: DISCONTINUED | OUTPATIENT
Start: 2019-08-31 | End: 2019-09-06 | Stop reason: HOSPADM

## 2019-08-31 RX ORDER — NALOXONE HYDROCHLORIDE 0.4 MG/ML
.1-.4 INJECTION, SOLUTION INTRAMUSCULAR; INTRAVENOUS; SUBCUTANEOUS
Status: DISCONTINUED | OUTPATIENT
Start: 2019-08-31 | End: 2019-09-06 | Stop reason: HOSPADM

## 2019-08-31 RX ORDER — ONDANSETRON 8 MG/1
8 TABLET, FILM COATED ORAL EVERY 6 HOURS PRN
Status: DISCONTINUED | OUTPATIENT
Start: 2019-08-31 | End: 2019-09-06

## 2019-08-31 RX ORDER — LORAZEPAM 2 MG/ML
.5-1 INJECTION INTRAMUSCULAR EVERY 6 HOURS PRN
Status: DISCONTINUED | OUTPATIENT
Start: 2019-08-31 | End: 2019-09-06

## 2019-08-31 RX ORDER — LIDOCAINE 40 MG/G
CREAM TOPICAL
Status: DISCONTINUED | OUTPATIENT
Start: 2019-08-31 | End: 2019-09-06 | Stop reason: HOSPADM

## 2019-08-31 RX ORDER — MAGNESIUM CARB/ALUMINUM HYDROX 105-160MG
148 TABLET,CHEWABLE ORAL
Status: DISCONTINUED | OUTPATIENT
Start: 2019-08-31 | End: 2019-09-06 | Stop reason: HOSPADM

## 2019-08-31 RX ORDER — AMOXICILLIN 250 MG
1 CAPSULE ORAL 2 TIMES DAILY
Status: DISCONTINUED | OUTPATIENT
Start: 2019-08-31 | End: 2019-09-06 | Stop reason: HOSPADM

## 2019-08-31 RX ORDER — LIDOCAINE 4 G/G
1 PATCH TOPICAL
Status: DISCONTINUED | OUTPATIENT
Start: 2019-08-31 | End: 2019-09-06 | Stop reason: HOSPADM

## 2019-08-31 RX ORDER — POTASSIUM CHLORIDE 1.5 G/1.58G
20-40 POWDER, FOR SOLUTION ORAL
Status: DISCONTINUED | OUTPATIENT
Start: 2019-08-31 | End: 2019-09-06 | Stop reason: HOSPADM

## 2019-08-31 RX ORDER — AMOXICILLIN 250 MG
2 CAPSULE ORAL 2 TIMES DAILY
Status: DISCONTINUED | OUTPATIENT
Start: 2019-08-31 | End: 2019-09-06 | Stop reason: HOSPADM

## 2019-08-31 RX ORDER — SODIUM CHLORIDE 9 MG/ML
INJECTION, SOLUTION INTRAVENOUS CONTINUOUS
Status: DISCONTINUED | OUTPATIENT
Start: 2019-08-31 | End: 2019-09-01

## 2019-08-31 RX ORDER — BISACODYL 5 MG
5 TABLET, DELAYED RELEASE (ENTERIC COATED) ORAL DAILY PRN
Status: DISCONTINUED | OUTPATIENT
Start: 2019-08-31 | End: 2019-09-06 | Stop reason: HOSPADM

## 2019-08-31 RX ADMIN — MORPHINE SULFATE 1 MG: 2 INJECTION, SOLUTION INTRAMUSCULAR; INTRAVENOUS at 02:48

## 2019-08-31 RX ADMIN — Medication 5 ML: at 02:54

## 2019-08-31 RX ADMIN — HYDROMORPHONE HYDROCHLORIDE 1 MG: 1 INJECTION, SOLUTION INTRAMUSCULAR; INTRAVENOUS; SUBCUTANEOUS at 16:50

## 2019-08-31 RX ADMIN — LORAZEPAM 0.5 MG: 2 INJECTION INTRAMUSCULAR; INTRAVENOUS at 18:31

## 2019-08-31 RX ADMIN — Medication: at 20:26

## 2019-08-31 RX ADMIN — MORPHINE SULFATE 1 MG: 2 INJECTION, SOLUTION INTRAMUSCULAR; INTRAVENOUS at 13:22

## 2019-08-31 RX ADMIN — DICLOFENAC 4 G: 10 GEL TOPICAL at 20:26

## 2019-08-31 RX ADMIN — MORPHINE SULFATE 1 MG: 2 INJECTION, SOLUTION INTRAMUSCULAR; INTRAVENOUS at 06:47

## 2019-08-31 RX ADMIN — MORPHINE SULFATE 4 MG: 2 INJECTION, SOLUTION INTRAMUSCULAR; INTRAVENOUS at 21:43

## 2019-08-31 RX ADMIN — ONDANSETRON 8 MG: 2 INJECTION INTRAMUSCULAR; INTRAVENOUS at 21:57

## 2019-08-31 RX ADMIN — DEXAMETHASONE SODIUM PHOSPHATE 10 MG: 4 INJECTION, SOLUTION INTRAMUSCULAR; INTRAVENOUS at 10:23

## 2019-08-31 RX ADMIN — TOLTERODINE 4 MG: 4 CAPSULE, EXTENDED RELEASE ORAL at 09:07

## 2019-08-31 RX ADMIN — SCOPALAMINE 1 PATCH: 1 PATCH, EXTENDED RELEASE TRANSDERMAL at 16:51

## 2019-08-31 RX ADMIN — OXYCODONE HYDROCHLORIDE 10 MG: 5 TABLET ORAL at 20:25

## 2019-08-31 RX ADMIN — BUSPIRONE HYDROCHLORIDE 15 MG: 15 TABLET ORAL at 09:07

## 2019-08-31 RX ADMIN — SENNOSIDES AND DOCUSATE SODIUM 1 TABLET: 8.6; 5 TABLET ORAL at 09:07

## 2019-08-31 RX ADMIN — GADOBUTROL 7.5 ML: 604.72 INJECTION INTRAVENOUS at 01:31

## 2019-08-31 RX ADMIN — OXYCODONE HYDROCHLORIDE 10 MG: 5 TABLET ORAL at 16:01

## 2019-08-31 RX ADMIN — RANITIDINE 150 MG: 150 TABLET ORAL at 20:25

## 2019-08-31 RX ADMIN — ONDANSETRON HYDROCHLORIDE 4 MG: 4 TABLET, FILM COATED ORAL at 13:43

## 2019-08-31 RX ADMIN — DICLOFENAC 4 G: 10 GEL TOPICAL at 14:17

## 2019-08-31 RX ADMIN — OXYCODONE HYDROCHLORIDE 10 MG: 5 TABLET ORAL at 10:17

## 2019-08-31 RX ADMIN — OXYCODONE HYDROCHLORIDE 10 MG: 5 TABLET ORAL at 01:42

## 2019-08-31 RX ADMIN — BUSPIRONE HYDROCHLORIDE 15 MG: 15 TABLET ORAL at 20:24

## 2019-08-31 RX ADMIN — SODIUM CHLORIDE: 9 INJECTION, SOLUTION INTRAVENOUS at 18:30

## 2019-08-31 RX ADMIN — DICLOFENAC 4 G: 10 GEL TOPICAL at 16:54

## 2019-08-31 RX ADMIN — LIDOCAINE 1 PATCH: 560 PATCH PERCUTANEOUS; TOPICAL; TRANSDERMAL at 10:19

## 2019-08-31 RX ADMIN — PROPRANOLOL HYDROCHLORIDE 60 MG: 60 CAPSULE, EXTENDED RELEASE ORAL at 09:07

## 2019-08-31 RX ADMIN — OXYCODONE HYDROCHLORIDE 10 MG: 5 TABLET ORAL at 05:36

## 2019-08-31 RX ADMIN — PROCHLORPERAZINE MALEATE 10 MG: 10 TABLET ORAL at 12:37

## 2019-08-31 RX ADMIN — RANITIDINE 150 MG: 150 TABLET ORAL at 10:17

## 2019-08-31 RX ADMIN — VENLAFAXINE HYDROCHLORIDE 225 MG: 75 CAPSULE, EXTENDED RELEASE ORAL at 09:07

## 2019-08-31 RX ADMIN — MORPHINE SULFATE 1 MG: 2 INJECTION, SOLUTION INTRAMUSCULAR; INTRAVENOUS at 08:59

## 2019-08-31 RX ADMIN — MORPHINE SULFATE 2 MG: 2 INJECTION, SOLUTION INTRAMUSCULAR; INTRAVENOUS at 19:07

## 2019-08-31 ASSESSMENT — PAIN DESCRIPTION - DESCRIPTORS
DESCRIPTORS: SHARP
DESCRIPTORS: CONSTANT;SHARP
DESCRIPTORS: SHARP
DESCRIPTORS: CONSTANT;SHARP
DESCRIPTORS: ACHING;SHARP
DESCRIPTORS: SHARP;CONSTANT
DESCRIPTORS: CONSTANT;SHARP
DESCRIPTORS: ACHING;SHARP

## 2019-08-31 ASSESSMENT — ENCOUNTER SYMPTOMS
CONFUSION: 0
COLOR CHANGE: 0
EYE REDNESS: 0
FEVER: 0
ARTHRALGIAS: 0
DIFFICULTY URINATING: 0
NECK STIFFNESS: 0
HEADACHES: 0
SHORTNESS OF BREATH: 0
ABDOMINAL PAIN: 0

## 2019-08-31 ASSESSMENT — ACTIVITIES OF DAILY LIVING (ADL)
ADLS_ACUITY_SCORE: 14
ADLS_ACUITY_SCORE: 18
ADLS_ACUITY_SCORE: 17
ADLS_ACUITY_SCORE: 14
ADLS_ACUITY_SCORE: 17

## 2019-08-31 NOTE — ED PROVIDER NOTES
[facility] EMERGENCY DEPARTMENT ([facility])  8/30/19 ED 23    History     Chief Complaint   Patient presents with     Back Pain     The history is provided by the patient and medical records.     Shaneka Alvarez is a 59 year old female with history of multiple sclerosis who presents for further evaluation of worsening low back pain and new finding of possible metastatic lesions at L1 and L4 seen on PET scan today. She has a history of multiple sclerosis and has been experiencing worsening of her condition over the past year with increased blurry vision or diplopia, wobbly balance with falls, difficulty riding a bicycle and right-sided tingling.  She also has a history of breast cancer diagnosed in 2006 status post treatment with chemotherapy, radiation, bilateral mastectomy with breast reconstruction.  She had follow-up with her neurology clinic and had repeat MRI brain and orbits for her new visual symptoms that showed multiple enhancing calvarial lesions which were new since July 2018 and consistent with metastatic disease.  She had PET scan scheduled for today which was performed and read by Radiology.  The final read is not yet in the system but the results were communicated to clinic PA who noted to impending pathologic fractures at L1 and L4.  Patient was contacted by clinic PA with these findings where patient noted progressively worsening back pain to the point of debilitation, is pretty much laying flat on her couch due to exacerbation of pain with movement to 7/10.  She was advised to come to the emergency department for pain control, neurosurgery and or radiation oncology consult to stabilize her spine versus radiation.  She was also told that she would likely need an MRI and eventually a tissue biopsy to confirm diagnosis and to get ER/IA/HER 2 status.  She now presents for evaluation.  She states that her low back pain has become considerably worse in the past 24 hours.  No known injuries.  No  other symptoms noted.    I have reviewed the Medications, Allergies, Past Medical and Surgical History, and Social History in the [employer] system.  PAST MEDICAL HISTORY:   Past Medical History:   Diagnosis Date     Breast cancer (H)     Age 44     Common migraine      H/O bilateral mastectomy      Mild major depression (H)      Multiple sclerosis (H)        PAST SURGICAL HISTORY:   Past Surgical History:   Procedure Laterality Date     HC REMOVAL OF OVARY/TUBE(S)       HERNIA REPAIR, UMBILICAL       mastectomy  Bilateral 2006     MASTECTOMY, BILATERAL         FAMILY HISTORY:   Family History   Problem Relation Age of Onset     Breast Cancer Maternal Aunt 50         at 85     Breast Cancer Cousin 40     Breast Cancer Mother 49        2nd primary, contralateral breast at 69;  at 86     Melanoma Mother      Breast Cancer Maternal Grandmother 40     Ovarian Cancer Maternal Grandmother      Breast Cancer Paternal Grandmother 50         at 60     Brain Cancer Cousin 20     Breast Cancer Paternal Aunt 50         at 60     Breast Cancer Cousin 45        paternal cousin       SOCIAL HISTORY:   Social History     Tobacco Use     Smoking status: Former Smoker     Types: Cigarettes     Last attempt to quit: 2005     Years since quittin.7     Smokeless tobacco: Never Used   Substance Use Topics     Alcohol use: Yes     Alcohol/week: 1.2 oz     Types: 1 Glasses of wine, 1 Cans of beer per week     Comment: TWICE A WEEK       Patient's Medications   New Prescriptions    No medications on file   Previous Medications    BUSPIRONE (BUSPAR) 15 MG TABLET    Take 1 tablet (15 mg) by mouth 2 times daily    CALCIUM CITRATE-VITAMIN D (CITRACAL) 315-250 MG-UNIT TABS    Take by mouth daily    CHOLECALCIFEROL (VITAMIN D3 PO)    Take 2,000 Units by mouth daily    ERENUMAB-AOOE (AIMOVIG 140 DOSE) 70 MG/ML INJECTION    Inject 2 mLs (140 mg) Subcutaneous every 30 days    KETOROLAC (TORADOL) 60 MG/2ML SOLN INJECTION    INJECT  "2MLS INTO THE MUSCLE ONCE FOR 1 DOSE    MAGNESIUM 250 MG TABLET    Take 1 tablet by mouth daily    ONDANSETRON PO    Take 4 mg by mouth as needed     PROPRANOLOL (INDERAL LA) 60 MG 24 HR CAPSULE    TAKE 1 CAPSULE(60 MG) BY MOUTH DAILY    SYRINGE/NEEDLE, DISP, (B-D INTEGRA SYRINGE) 23G X 1\" 3 ML MISC    1 each as needed    TOLTERODINE ER (DETROL LA) 4 MG 24 HR CAPSULE    Take 1 capsule (4 mg) by mouth daily    VENLAFAXINE (EFFEXOR-ER) 225 MG TB24 24 HR TABLET    TAKE 1 TABLET(225 MG) BY MOUTH DAILY WITH BREAKFAST   Modified Medications    No medications on file   Discontinued Medications    No medications on file          Allergies   Allergen Reactions     Bactrim [Sulfamethoxazole W/Trimethoprim]      Internal bleeding     Copaxone [Glatiramer] Hives      Review of Systems   Constitutional: Negative for fever.   HENT: Negative for congestion.    Eyes: Negative for redness.   Respiratory: Negative for shortness of breath.    Cardiovascular: Negative for chest pain.   Gastrointestinal: Negative for abdominal pain.   Genitourinary: Negative for difficulty urinating.   Musculoskeletal: Positive for back pain. Negative for arthralgias and neck stiffness.   Skin: Negative for color change.   Neurological: Negative for headaches.   Psychiatric/Behavioral: Negative for confusion.       Physical Exam   BP: (!) 171/108  Pulse: 79  Temp: 97.5  F (36.4  C)  Resp: 18  Height: 157.5 cm (5' 2\")  Weight: 74.6 kg (164 lb 6.4 oz)  SpO2: 100 %      Physical Exam   Constitutional: She is oriented to person, place, and time. She appears well-developed and well-nourished. No distress.   HENT:   Head: Normocephalic and atraumatic.   Mouth/Throat: No oropharyngeal exudate.   Eyes: Pupils are equal, round, and reactive to light. Right eye exhibits no discharge. Left eye exhibits no discharge. No scleral icterus.   Neck: Normal range of motion. Neck supple.   Cardiovascular: Normal rate, regular rhythm, normal heart sounds and intact distal " pulses. Exam reveals no gallop and no friction rub.   No murmur heard.  Pulmonary/Chest: Effort normal and breath sounds normal. No respiratory distress. She has no wheezes. She exhibits no tenderness.   Abdominal: Soft. Bowel sounds are normal. She exhibits no distension. There is no tenderness.   Musculoskeletal: Normal range of motion. She exhibits no edema, tenderness or deformity.   Neurological: She is alert and oriented to person, place, and time. No cranial nerve deficit.   Skin: Skin is warm and dry. No rash noted. She is not diaphoretic. No erythema. No pallor.   Psychiatric: She has a normal mood and affect.   Nursing note and vitals reviewed.      ED Course        Procedures             Critical Care time:  none             Labs Ordered and Resulted from Time of ED Arrival Up to the Time of Departure from the ED - No data to display         Assessments & Plan (with Medical Decision Making)   Is a 59-year-old female who is with worsening low back pain.  She was found to have possible metastatic lesions to L1 and L4 on PET scan today.  No focal neuro deficits on exam.  Her pain has become considerably worse in the past 24 hours.  Lab work shows no acute abnormalities.  I discussed case with neurosurgery who recommends MRI.  Patient was given pain control in the emergency department.  Neurology will admit to their service.    I have reviewed the nursing notes.    I have reviewed the findings, diagnosis, plan and need for follow up with the patient.    New Prescriptions    No medications on file       Final diagnoses:   None   BRENT, Ysabel Rea, am serving as a trained medical scribe to document services personally performed by Damian Parada DO based on the provider's statements to me on 8/30/19.  This document has been checked and approved by the attending provider.    Damian KENNEDY DO, was physically present and have reviewed and verified the accuracy of this note documented by Ysabel Rea,  medical scribe.        8/30/2019   [facility], [place], EMERGENCY DEPARTMENT     Damian Parada DO  08/31/19 0215

## 2019-08-31 NOTE — PLAN OF CARE
PT 7D: PT order for evaluation and treatment acknowledged. Pt place on strict bedrest this AM and awaiting neurosurgery consult. PT on hold until activity orders are updated to allow out of bed activity.

## 2019-08-31 NOTE — H&P
[facility] [place]    Hematology / Oncology  Admission History & Physical     Date of Admission:  8/30/2019  Date of Service: 08/31/19  Primary Oncologist: Dr. Stephany Amaya    Assessment & Plan   Shaneka Alvarez is a 59 year old female with history of multiple sclerosis which is stable, T2 N1 M0 invasive lobular carcinoma of the right breast that was ER+/WY+ HER-2 negative without BRCA mutations but with a VUS in the MSH2 gene, s/p bilateral mastectomies, admitted today after recent findings of metastatic lesions to to skull and to vertebrae with concern for pathologic fracture at L1 and L4 and progressive pain.    ## severe back pain, progress for 1 year  ## L1 and L4 impending pathologic fractures, verbal report MRI, without evidence of neuropathy  ## baseline right sided UE and LE weakness from MS  No need for restriction in motion at this time. Patient advised to take extra care with walking  -- PT consult  -- MRI lumbar & thoracic spine pending  -- Neurosurgery consult pending  -- pain control regimen: tylenol, advil, oxycodone 5-10 mg q3hr PRN for moderate pain, morphine 1 mg q3hr PRN for severe pain, senna-docusate scheduled, additional bowel regimen made available    ## likely metastatic lesions of skull, C2, L1, L5 with laterr lumbar vertebrae at risk for fracture  ## history of ER+WY+EKK2pik T2 N1 M0 breast cancer  Most likely this represents recurrence. May not have same receptor profile. Low possibility that it is separate cancer. Will need biopsy but would wait for final PET read to see if there is a site more amenable to biopsy. Based on CT associated with PET there is a left lung base loose mass.   -- pending PET results, consider Pulm v. IR consult    ## subacute productive cough without fevers, chills, night sweats  ## left lung mass  Not coughing at present time but will order sputum cultures in case she brings up anything. In absence of any additional symptoms,  signs, or labs concerning for infection will not do a full infection workup as of yet.  -- sputum culture & gram stain ordered    ## MS with right UE and LE weakness  ## Migraine  In particular weak in right lower leg with weakness of hip flexion, ankle dorsiflexion, and toe dorsiflexion without actual evidence of foot drop. Recent Brain imaging without evidence of new MS lesions.  -- PT ordered as noted above  -- propranolol and continued for migraine but aimovig, magnesium held for now    ## overactive bladder. Continue tolterodine  ## depression. Continue venlafaxine, buspar    FEN: NPO at midnight, no maintenance IV fluids, replete lytes PRN  Prophylaxis: mechanical and ambulate in case she needs procedure tomorrow  Code: FULL  Disposition: Pending further workup of lumbar spine lesions, if possible tissue sampling for further evaluation of presume metastatic disease        Chief Complaint   Back pain    History of Present Illness   Shaneka Alvarez is a 59 year old female with history of multiple sclerosis which is stable with right-sided weakness, T2 N1 M0 invasive lobular carcinoma of the right breast that was ER positive, FL positive, HER-2 negative, without BRCA mutations, but with a BUS in the MSH2 gene.  In 2016 she had a right modified radical mastectomy and a left prophylactic mastectomy with reconstruction.  Prior to that she underwent chemo and radiation with Adriamycin, Cytoxan, a Avastin followed by Taxol and a Avastin and finally in 2000 62,007 right breast radiation.  She was on Aromasin from 2007-20 14 but stopped after moving to the [facility].    She reports that for the last year she has had progressive worsening upper back pain that is at its worse when she tries to get up from laying down or lay down from being up.  She has managed his pain with ibuprofen and Tylenol and has been able to continue to go to work.  She works as a .  She had noticed some blurry midrange  vision, some variable weakness of the right upper and lower extremity and was seen by a neurologist who ordered a brain MRI.  The brain MRI was concerning for calvarial lesions suggestive of metastatic disease and also a C2 lesion.  She was referred back to oncology who had her get a PET scan on 8/30 with preliminary read showing near pathologic fractures of L1 and L5.  In setting of further increasing pain with patient unable to get up off the couch and L1 L5 findings she was asked to come to the ED for accelerated work-up.    She denies current blurry vision, rash, dyspnea, dyspnea with exertion, new onset weakness, new onset loss of bowel or bladder control, fevers, chills, night sweats, recent cold or flu, abdominal pain.  She endorses occasional soft stools, tingling of her fingers and toes which is at baseline, right upper extremity and lower extremity weakness which is at her baseline, and about 1 week of cough with productive sputum which she has not looked at and does not know if there is any blood in it.    Heme/Onc History (per most recent clinic visit note):  ONCOLOGY DIAGNOSIS:    1. January 2006:  Diagnosed with Stage IIB, T2 N1 M0 invasive lobular carcinoma of the right breast.  Final pathology showed a 4.5 x 3.8 x 2.5 cm, 01/14 lymph nodes positive.  Estrogen, progesterone receptor positive, HER-2 negative.   2.  Genetics.  BRCA1 and 2 mutations not detected. 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       THERAPY TO DATE:   1. 2006:  ECOG 2104 protocol of dose-dense Adriamycin, Cytoxan and Avastin x4 cycles followed by Taxol and Avastin x4 cycles.   2.  03/2007:  Completed 1 year Avastin.   3.  11/2006-01/2007:  Radiotherapy to the right breast of 5040 cGy.   4.  03/20072532-1271:  Aromasin.  Stopped after moving to the [facility].   5.  01/2016:  Right modified radical mastectomy with latissimus dorsi flap reconstruction and a left prophylactic mastectomy with latissimus dorsi flap  "reconstruction.     Past Medical History    I have reviewed this patient's medical history and updated it with pertinent information if needed.   Past Medical History:   Diagnosis Date     Breast cancer (H)     Age 44     Common migraine      H/O bilateral mastectomy      Mild major depression (H)      Multiple sclerosis (H)        Past Surgical History   I have reviewed this patient's surgical history and updated it with pertinent information if needed.  Past Surgical History:   Procedure Laterality Date     HC REMOVAL OF OVARY/TUBE(S)       HERNIA REPAIR, UMBILICAL       mastectomy  Bilateral 2006     MASTECTOMY, BILATERAL         Prior to Admission Medications   Prior to Admission Medications   Prescriptions Last Dose Informant Patient Reported? Taking?   Cholecalciferol (VITAMIN D3 PO)   Yes No   Sig: Take 2,000 Units by mouth daily   ONDANSETRON PO   Yes No   Sig: Take 4 mg by mouth as needed    busPIRone (BUSPAR) 15 MG tablet   No No   Sig: Take 1 tablet (15 mg) by mouth 2 times daily   calcium citrate-vitamin D (CITRACAL) 315-250 MG-UNIT TABS   Yes No   Sig: Take by mouth daily   erenumab-aooe (AIMOVIG 140 DOSE) 70 MG/ML injection   No No   Sig: Inject 2 mLs (140 mg) Subcutaneous every 30 days   ketorolac (TORADOL) 60 MG/2ML SOLN injection   No No   Sig: INJECT 2MLS INTO THE MUSCLE ONCE FOR 1 DOSE   magnesium 250 MG tablet   Yes No   Sig: Take 1 tablet by mouth daily   propranolol (INDERAL LA) 60 MG 24 hr capsule   No No   Sig: TAKE 1 CAPSULE(60 MG) BY MOUTH DAILY   syringe/needle, disp, (B-D INTEGRA SYRINGE) 23G X 1\" 3 ML MISC   No No   Si each as needed   tolterodine ER (DETROL LA) 4 MG 24 hr capsule   No No   Sig: Take 1 capsule (4 mg) by mouth daily   venlafaxine (EFFEXOR-ER) 225 MG TB24 24 hr tablet   No No   Sig: TAKE 1 TABLET(225 MG) BY MOUTH DAILY WITH BREAKFAST      Facility-Administered Medications: None     Allergies   Allergies   Allergen Reactions     Bactrim [Sulfamethoxazole W/Trimethoprim] "      Internal bleeding     Copaxone [Glatiramer] Hives       Social History   Social History     Socioeconomic History     Marital status:      Spouse name: Kash     Number of children: 3     Years of education: Not on file     Highest education level: Not on file   Occupational History     Employer: [employer]   Social Needs     Financial resource strain: Not on file     Food insecurity:     Worry: Not on file     Inability: Not on file     Transportation needs:     Medical: Not on file     Non-medical: Not on file   Tobacco Use     Smoking status: Former Smoker     Types: Cigarettes     Last attempt to quit: 2005     Years since quittin.7     Smokeless tobacco: Never Used   Substance and Sexual Activity     Alcohol use: Yes     Alcohol/week: 1.2 oz     Types: 1 Glasses of wine, 1 Cans of beer per week     Comment: TWICE A WEEK     Drug use: No     Sexual activity: Yes     Partners: Male     Birth control/protection: None     Comment: not needed after chemo   Lifestyle     Physical activity:     Days per week: Not on file     Minutes per session: Not on file     Stress: Not on file   Relationships     Social connections:     Talks on phone: Not on file     Gets together: Not on file     Attends Christian service: Not on file     Active member of club or organization: Not on file     Attends meetings of clubs or organizations: Not on file     Relationship status: Not on file     Intimate partner violence:     Fear of current or ex partner: Not on file     Emotionally abused: Not on file     Physically abused: Not on file     Forced sexual activity: Not on file   Other Topics Concern     Parent/sibling w/ CABG, MI or angioplasty before 65F 55M? Not Asked   Social History Narrative     Not on file       Family History   I have reviewed this patient's family history and updated it with pertinent information if needed.   Family History   Problem Relation Age of Onset     Breast Cancer Maternal Aunt  50         at 85     Breast Cancer Cousin 40     Breast Cancer Mother 49        2nd primary, contralateral breast at 69;  at 86     Melanoma Mother      Breast Cancer Maternal Grandmother 40     Ovarian Cancer Maternal Grandmother      Breast Cancer Paternal Grandmother 50         at 60     Brain Cancer Cousin 20     Breast Cancer Paternal Aunt 50         at 60     Breast Cancer Cousin 45        paternal cousin       Review of Systems   A comprehensive ROS was performed with the patient and was found to be negative or non-contributory with the exception of that noted in the HPI above.    Physical Exam   Temp:  [97.5  F (36.4  C)] 97.5  F (36.4  C)  Pulse:  [79] 79  Resp:  [18] 18  BP: (171)/(108) 171/108  SpO2:  [100 %] 100 %  CONSTITUTIONAL: Alert and interactive. Lying in bed, distressed when attempting to sit up or lay back down in bed  HEENT: NC/AT, PERRLA, EOMI, anicteric sclera, oropharynx is pink and moist without erythema/exudates/lesions/thrush.  NECK: Supple, full ROM, no appreciable thyromegaly. Port in place, not yet accessed  LYMPHATIC: No palpable mandibular/cervical/supra/infraclavicular lymph nodes.  CARDIOVASCULAR: RRR. Normal S1/S2. No murmurs, click, or rub. 2+ equal and bilateral radial and pedal pulses.   RESPIRATORY: CTAB. No wheezes appreciated. Normal respiratory effort on ambient air.  GASTROINTESTINAL: Soft, non-tender, non-distended, normoactive bowel sounds, no masses or organomegaly appreciated.  MUSCULOSKELETAL: No joint swelling or tenderness. Lump of muscle at lateral aspect of right breast  EXTREMITIES: No lower extremity edema.   SKIN: Warm and intact. No concerning lesions or rashes on exposed skin surfaces. No jaundice.  NEUROLOGIC: Alert and fully oriented, CN II-XII grossly intact, appropriately responsive during interview. 4/5 strength on hip flexion, ankle dorsiflexion, toe dorsiflexion on right. 4/5 strength for right shoulder abduction, elbow flexion, finger  squeeze.  VASCULAR ACCESS: C/D/I, non-tender port site.    Data   I have personally reviewed the following labs/imaging:  Results for orders placed or performed during the hospital encounter of 08/30/19 (from the past 24 hour(s))   CBC with platelets differential   Result Value Ref Range    WBC 4.6 4.0 - 11.0 10e9/L    RBC Count 4.94 3.8 - 5.2 10e12/L    Hemoglobin 14.0 11.7 - 15.7 g/dL    Hematocrit 43.0 35.0 - 47.0 %    MCV 87 78 - 100 fl    MCH 28.3 26.5 - 33.0 pg    MCHC 32.6 31.5 - 36.5 g/dL    RDW 12.7 10.0 - 15.0 %    Platelet Count 202 150 - 450 10e9/L    Diff Method Automated Method     % Neutrophils 56.1 %    % Lymphocytes 26.8 %    % Monocytes 12.6 %    % Eosinophils 3.2 %    % Basophils 0.9 %    % Immature Granulocytes 0.4 %    Nucleated RBCs 0 0 /100    Absolute Neutrophil 2.6 1.6 - 8.3 10e9/L    Absolute Lymphocytes 1.2 0.8 - 5.3 10e9/L    Absolute Monocytes 0.6 0.0 - 1.3 10e9/L    Absolute Eosinophils 0.2 0.0 - 0.7 10e9/L    Absolute Basophils 0.0 0.0 - 0.2 10e9/L    Abs Immature Granulocytes 0.0 0 - 0.4 10e9/L    Absolute Nucleated RBC 0.0    Basic metabolic panel   Result Value Ref Range    Sodium 134 133 - 144 mmol/L    Potassium 3.6 3.4 - 5.3 mmol/L    Chloride 100 94 - 109 mmol/L    Carbon Dioxide 26 20 - 32 mmol/L    Anion Gap 7 3 - 14 mmol/L    Glucose 92 70 - 99 mg/dL    Urea Nitrogen 9 7 - 30 mg/dL    Creatinine 0.79 0.52 - 1.04 mg/dL    GFR Estimate 83 >60 mL/min/[1.73_m2]    GFR Estimate If Black >90 >60 mL/min/[1.73_m2]    Calcium 8.8 8.5 - 10.1 mg/dL     All imaging personally reviewed.

## 2019-08-31 NOTE — PROGRESS NOTES
S: Pt. seen at the [facility]. Female. RX: Zurich Brace. DX: L1-4 Pathologic FX.   O:A: Pt. has not had surgery. Today I F/D a Zurich TLSO size Large short. Pt. was not able to sit or stand. Donning was done through log rolling. Fit was good. Pt. did not find the Orthosis comfortable and did not get out of bed. Donning doffing wear and care instructions given to Pt. and .   P: Pt. to be seen as needed.    Joseph METZGER,WENDY

## 2019-08-31 NOTE — PROVIDER NOTIFICATION
Notified Cross Cover- pt pain is intolerable and has not voided since 0230 in the morning. Unable to void on bedpan. Got order to st.cath.     New order for IV Dilaudid. Gave Dilaudid IV- pt became flush and got a HA- doesn't want to try Dilaudid again. Notified MD and switched back to IV Morphine.

## 2019-08-31 NOTE — PLAN OF CARE
Nursing Focus: Admission  D: Arrived at 0200 from ER via personal car. Patient accompanied by self. Admitted for PET scan showing progressive metastasis. Complains of lower back pain.      I: Admission process began.  Patient oriented to room, enviroment, call light.  Md. notified of patients arrival on unit.     A: Vital signs stable, afebrile. Pt c/o lower back pain - 1mg IV morphine given x2, 10mg oxy administered x1. Pt denied SOB, but throughout shift lung sounds became wheezy/course - encouraged to cough, deep breath, keep HOB upright. Pt denied n/v. IV pole ordered. Pt SBA, instructed to call when needing to get out of bed. Port has brisk blood return. Pt NPO except ice chips. Sputum culture still needs to be collected. No significant events, pt stated she did not sleep much due to pain, pt stable at this time and continue POC.    P: Implement plan of care when available. Continue to monitor patient. Nursing interventions as appropriate. Notify md with changes in pt status.      Per Mobility Level Guideline;  Bed/chair alarm No  Patient may ambulate SBA  Patient requires the following assistive equipment: Assistive person  PT/OT consult orders in place Yes

## 2019-08-31 NOTE — PLAN OF CARE
Neuro: A&Ox4.   Cardiac:  HTN- orders for PRN.    Respiratory: Sating 99% on RA.  GI/: Low urine and unable to void. Straight cath x 1.   Diet/appetite: Poor PO- nausea- IV antiemetics given.   Activity:  Bedrest. Can get out of bed with Brace on. (fitted for brace this evening). Has not gotten out of bed yet.   Pain: Back pain Not controlled on current regimen. Morphine Iv increased. New gel ordered. Started IV ativan.   Skin: No new deficits noted. Reports tingling in LE, baseline.   LDA's: PORT infusing NS.     Plan: Continue with POC. Notify primary team with changes. Will Spine MRI tomorrow (possibly need PIV placed). Declined PIV placement this evening, needs UltraSound per pt.

## 2019-08-31 NOTE — PROGRESS NOTES
Brief Oncology Note    Admitted overnight from clinic with increasing LBP, prompting imaging. Spine MRI showing L1 compression fx. MRI brain 8/21 and PET/CT 8/30, T spine MRI 8/31 also showing evidence concerning for recurrent breast cancer.     A/P  # Compression fx, pain  Neurosurgery consult regarding stability; they're recommending starting with a brace, see how she tolerates it. If no surgery, will ask Rad Onc for input. She'd need treatment here and not MG as their machine is down until Nov during update.    - dexamethasone 10 mg IV once today. If effective, can start 4 mg bid for a few days, then taper to 4 mg daily   - encourage oxycodone over IV morphine   - lidocaine patch, diclofenac gel   - bowel regimen     # Recurrent metastatic breast cancer vs new primary  Discussed need for bx. If surgical intervention for her back is done, we will get a tissue diagnosis that way. If not, then will await read from PET/CT and decide where easiest to bx. I'm not seeing much on the PET/CT on my own review, so we might end up getting a biopsy of one of the spine lesions.     # Dispo   When back stabilized and definitive recs for surgical vs non surgical management made, PT/OT. Anticipate 1-2 days    Ya Mejia MD

## 2019-08-31 NOTE — PLAN OF CARE
Vitals stable,weakness on right upper and lower extremities,pain lower back managed with prn morphine one mg x 2,prn oxycodone 10 mg x 1.Lidocaine  patch applied to lower back,Voltaren jell applied to rib cage and mid back.Had intermittent nausea and emesis, prn compazine given x1 ,med not effective,prn Zofran given at 1340,pt npo all day,now on regular diet, would get food for her per pt., pt taking sips of liquids.Patient seen by neuro surgery provider,pt was placed on strict bedrest,TLSO brace ordered,pt on spine precaution. CT Lumbar spine done this afternoon,result pending.MR of cervical spine ordered,check list sent down,waiting to be called. X-ray of spine ordered,test to be done standing,call X ray dept  for the test when TLSO brace is available and pt is fitted.Continue per plan of care.

## 2019-08-31 NOTE — CONSULTS
[facility]       NEUROSURGERY CONSULTATION NOTE    This consultation was requested by Dr. Mejia from the Heme/Onc service.    Reason for Consultation: spinal metastasis     HPI:  Shaneka Alvarez is a 58 yo female with history of breast cancer and multiple sclerosis (MS). The patient was having neurologic symptoms of blurry vision. The patient has baseline weakness of the right upper and lower extremity that is suspected to be secondary to her MS. Per the patient this has been ongoing for years.     However, due to the blurry vision, the patient underwent a MRI brain that revealed a C2 lesion concerning for metastasis. She was recommended to present for additional advanced imaging of her spine. She is concerned about pain at her low back, that has been progressive for the past year. She has also been having bowel and bladder incontinence that started about 1 year ago. She denies any use of glatiramer or immune suppressants or chemotherapy at present. She denies any spine surgery at present.     PAST MEDICAL HISTORY:   Past Medical History:   Diagnosis Date     Breast cancer (H)     Age 44     Common migraine      H/O bilateral mastectomy      Mild major depression (H)      Multiple sclerosis (H)      PAST SURGICAL HISTORY:   Past Surgical History:   Procedure Laterality Date     HC REMOVAL OF OVARY/TUBE(S)       HERNIA REPAIR, UMBILICAL       mastectomy  Bilateral 2006     MASTECTOMY, BILATERAL       FAMILY HISTORY:   Family History   Problem Relation Age of Onset     Breast Cancer Maternal Aunt 50         at 85     Breast Cancer Cousin 40     Breast Cancer Mother 49        2nd primary, contralateral breast at 69;  at 86     Melanoma Mother      Breast Cancer Maternal Grandmother 40     Ovarian Cancer Maternal Grandmother      Breast Cancer Paternal Grandmother 50         at 60     Brain Cancer Cousin 20     Breast Cancer Paternal Aunt 50         at 60  "    Breast Cancer Cousin 45        paternal cousin     SOCIAL HISTORY: works as a .   Social History     Tobacco Use     Smoking status: Former Smoker     Types: Cigarettes     Last attempt to quit: 2005     Years since quittin.7     Smokeless tobacco: Never Used   Substance Use Topics     Alcohol use: Yes     Alcohol/week: 1.2 oz     Types: 1 Glasses of wine, 1 Cans of beer per week     Comment: TWICE A WEEK     MEDICATIONS:  No current outpatient medications on file.     Allergies:  Allergies   Allergen Reactions     Bactrim [Sulfamethoxazole W/Trimethoprim]      Internal bleeding     Copaxone [Glatiramer] Hives       ROS: 10 point ROS of systems including Constitutional, Eyes, Respiratory, Cardiovascular, Gastroenterology, Genitourinary, Integumentary, Muscularskeletal, Psychiatric were all negative except for pertinent positives noted in my HPI.    Physical exam:   Blood pressure (!) 152/89, pulse 84, temperature 97.2  F (36.2  C), temperature source Oral, resp. rate 18, height 1.575 m (5' 2\"), weight 74.6 kg (164 lb 6.4 oz), SpO2 95 %, not currently breastfeeding.  General: awake and alert  HEENT: normocephalic/atraumatic   PULM: breathing comfortably on room air  NEUROLOGIC:  -- Awake; Alert; oriented x 3  -- Follows commands briskly  -- Speech fluent, spontaneous. No aphasia or dysarthria.  -- no gaze preference. No apparent hemineglect.  -- PERRL 3-2mm bilat and brisk, extraocular movements intact  -- face symmetrical, tongue midline  -- hearing grossly intact   -- Trapezii 5/5 strength bilat symmetric  -- Cerebellar: Finger nose finger without dysmetria  Motor:  No Pronator Drift     Delt Bi Tri Hand Flexion/  Extension Iliopsoas Quadriceps Hamstrings Tibialis Anterior Gastroc    C5 C6 C7 C8/T1 L2 L3 L4-S1 L4 S1   R 4+ 4+ 4+ 4+ 4 4 4 4 4   L 5 5 5 5 5 5 5 5 5   Sensory:  intact to LT x 4 extremities     IMAGING:  MRI brain: multiple skull lesions that are contrast enhancing. " "Evidence of lytic destruction of C2. No areas of parenchymal enhancement. Concern for pachy meningeal enhancement. No hydrocephalus.   MRI T spine: multiple lesions throughout thoracic spine that are contrast enhancing. Spinal canal without compressive metastatic lesion. Largest lesion is at T9.   MRI L spine: multiple lesions throughout thoracic spine that are contrast enhancing. Spinal canal without compressive metastatic lesion. At L1, there is a pathologic compression fracture with ~ 50% loss of height and right pedicle involvement of contrast enhancing lesion with some associated retropulsion.     LABS:   BMP: within normal limits  CBC: within normal limits   INR: 1.09    ASSESSMENT:  Diffuse bone metastases from primary breast cancer; likely surgical lesion at L1 based spinal instability neoplastic score of 13 -> will attempt to manage conservatively to assess for reduction of pain    RECOMMENDATIONS  No neurosurgical intervention indicated at this time   Obtain Gorge brace   Continue bedrest with head of bed < 30 deg at this time   CT of L spine  MRI of C spine  Ok for regular diet at this time  Anticipate complete spine XR after obtaining Gorge brace   Rad-onc consultation  DVT: SCDs while in bed    The patient was discussed with Dr. Jack Leschke, neurosurgery chief resident, and he agrees with the above.    Bill \"Drew\" MD Perla   Neurosurgery, PGY-3    Please contact neurosurgery resident on call with questions.    Dial * * [phone], enter [phone] when prompted.       "

## 2019-09-01 ENCOUNTER — APPOINTMENT (OUTPATIENT)
Dept: PHYSICAL THERAPY | Facility: CLINIC | Age: 59
DRG: 478 | End: 2019-09-01
Attending: STUDENT IN AN ORGANIZED HEALTH CARE EDUCATION/TRAINING PROGRAM
Payer: COMMERCIAL

## 2019-09-01 ENCOUNTER — APPOINTMENT (OUTPATIENT)
Dept: GENERAL RADIOLOGY | Facility: CLINIC | Age: 59
DRG: 478 | End: 2019-09-01
Attending: STUDENT IN AN ORGANIZED HEALTH CARE EDUCATION/TRAINING PROGRAM
Payer: COMMERCIAL

## 2019-09-01 LAB
ANION GAP SERPL CALCULATED.3IONS-SCNC: 7 MMOL/L (ref 3–14)
BASOPHILS # BLD AUTO: 0 10E9/L (ref 0–0.2)
BASOPHILS NFR BLD AUTO: 0.2 %
BUN SERPL-MCNC: 14 MG/DL (ref 7–30)
CALCIUM SERPL-MCNC: 8.3 MG/DL (ref 8.5–10.1)
CHLORIDE SERPL-SCNC: 106 MMOL/L (ref 94–109)
CO2 SERPL-SCNC: 22 MMOL/L (ref 20–32)
CREAT SERPL-MCNC: 0.72 MG/DL (ref 0.52–1.04)
DIFFERENTIAL METHOD BLD: ABNORMAL
EOSINOPHIL # BLD AUTO: 0 10E9/L (ref 0–0.7)
EOSINOPHIL NFR BLD AUTO: 0 %
ERYTHROCYTE [DISTWIDTH] IN BLOOD BY AUTOMATED COUNT: 12.5 % (ref 10–15)
GFR SERPL CREATININE-BSD FRML MDRD: >90 ML/MIN/{1.73_M2}
GLUCOSE SERPL-MCNC: 91 MG/DL (ref 70–99)
HCT VFR BLD AUTO: 43.4 % (ref 35–47)
HGB BLD-MCNC: 13.4 G/DL (ref 11.7–15.7)
IMM GRANULOCYTES # BLD: 0 10E9/L (ref 0–0.4)
IMM GRANULOCYTES NFR BLD: 0.5 %
LYMPHOCYTES # BLD AUTO: 0.6 10E9/L (ref 0.8–5.3)
LYMPHOCYTES NFR BLD AUTO: 9.9 %
MCH RBC QN AUTO: 28 PG (ref 26.5–33)
MCHC RBC AUTO-ENTMCNC: 30.9 G/DL (ref 31.5–36.5)
MCV RBC AUTO: 91 FL (ref 78–100)
MONOCYTES # BLD AUTO: 0.4 10E9/L (ref 0–1.3)
MONOCYTES NFR BLD AUTO: 7.1 %
NEUTROPHILS # BLD AUTO: 4.9 10E9/L (ref 1.6–8.3)
NEUTROPHILS NFR BLD AUTO: 82.3 %
NRBC # BLD AUTO: 0 10*3/UL
NRBC BLD AUTO-RTO: 0 /100
PLATELET # BLD AUTO: 207 10E9/L (ref 150–450)
POTASSIUM SERPL-SCNC: 4 MMOL/L (ref 3.4–5.3)
RBC # BLD AUTO: 4.79 10E12/L (ref 3.8–5.2)
SODIUM SERPL-SCNC: 135 MMOL/L (ref 133–144)
WBC # BLD AUTO: 6 10E9/L (ref 4–11)

## 2019-09-01 PROCEDURE — 12000001 ZZH R&B MED SURG/OB UMMC

## 2019-09-01 PROCEDURE — 25000132 ZZH RX MED GY IP 250 OP 250 PS 637: Performed by: INTERNAL MEDICINE

## 2019-09-01 PROCEDURE — 25000128 H RX IP 250 OP 636: Performed by: EMERGENCY MEDICINE

## 2019-09-01 PROCEDURE — 25000128 H RX IP 250 OP 636: Performed by: INTERNAL MEDICINE

## 2019-09-01 PROCEDURE — 25000131 ZZH RX MED GY IP 250 OP 636 PS 637: Performed by: INTERNAL MEDICINE

## 2019-09-01 PROCEDURE — 97161 PT EVAL LOW COMPLEX 20 MIN: CPT | Mod: GP

## 2019-09-01 PROCEDURE — 25000132 ZZH RX MED GY IP 250 OP 250 PS 637: Performed by: STUDENT IN AN ORGANIZED HEALTH CARE EDUCATION/TRAINING PROGRAM

## 2019-09-01 PROCEDURE — 80048 BASIC METABOLIC PNL TOTAL CA: CPT | Performed by: STUDENT IN AN ORGANIZED HEALTH CARE EDUCATION/TRAINING PROGRAM

## 2019-09-01 PROCEDURE — 97530 THERAPEUTIC ACTIVITIES: CPT | Mod: GP

## 2019-09-01 PROCEDURE — 72082 X-RAY EXAM ENTIRE SPI 2/3 VW: CPT

## 2019-09-01 PROCEDURE — 36592 COLLECT BLOOD FROM PICC: CPT | Performed by: STUDENT IN AN ORGANIZED HEALTH CARE EDUCATION/TRAINING PROGRAM

## 2019-09-01 PROCEDURE — 99232 SBSQ HOSP IP/OBS MODERATE 35: CPT | Mod: GC | Performed by: INTERNAL MEDICINE

## 2019-09-01 PROCEDURE — 85025 COMPLETE CBC W/AUTO DIFF WBC: CPT | Performed by: STUDENT IN AN ORGANIZED HEALTH CARE EDUCATION/TRAINING PROGRAM

## 2019-09-01 RX ORDER — ZOLPIDEM TARTRATE 5 MG/1
5 TABLET ORAL
Status: DISCONTINUED | OUTPATIENT
Start: 2019-09-01 | End: 2019-09-06 | Stop reason: HOSPADM

## 2019-09-01 RX ORDER — DEXAMETHASONE 4 MG/1
4 TABLET ORAL EVERY 12 HOURS SCHEDULED
Status: DISCONTINUED | OUTPATIENT
Start: 2019-09-01 | End: 2019-09-04

## 2019-09-01 RX ORDER — GADOBUTROL 604.72 MG/ML
0.1 INJECTION INTRAVENOUS ONCE
Status: DISCONTINUED | OUTPATIENT
Start: 2019-09-01 | End: 2019-09-04 | Stop reason: CLARIF

## 2019-09-01 RX ADMIN — MORPHINE SULFATE 4 MG: 2 INJECTION, SOLUTION INTRAMUSCULAR; INTRAVENOUS at 22:48

## 2019-09-01 RX ADMIN — BUSPIRONE HYDROCHLORIDE 15 MG: 15 TABLET ORAL at 19:57

## 2019-09-01 RX ADMIN — ONDANSETRON 8 MG: 2 INJECTION INTRAMUSCULAR; INTRAVENOUS at 09:49

## 2019-09-01 RX ADMIN — RANITIDINE 150 MG: 150 TABLET ORAL at 19:57

## 2019-09-01 RX ADMIN — DEXAMETHASONE 4 MG: 4 TABLET ORAL at 13:01

## 2019-09-01 RX ADMIN — VENLAFAXINE HYDROCHLORIDE 225 MG: 75 CAPSULE, EXTENDED RELEASE ORAL at 07:43

## 2019-09-01 RX ADMIN — MORPHINE SULFATE 2 MG: 2 INJECTION, SOLUTION INTRAMUSCULAR; INTRAVENOUS at 17:46

## 2019-09-01 RX ADMIN — ONDANSETRON HYDROCHLORIDE 8 MG: 8 TABLET, FILM COATED ORAL at 21:09

## 2019-09-01 RX ADMIN — ENOXAPARIN SODIUM 40 MG: 40 INJECTION SUBCUTANEOUS at 11:32

## 2019-09-01 RX ADMIN — Medication 5 ML: at 22:48

## 2019-09-01 RX ADMIN — Medication: at 16:00

## 2019-09-01 RX ADMIN — ZOLPIDEM TARTRATE 5 MG: 5 TABLET, FILM COATED ORAL at 22:48

## 2019-09-01 RX ADMIN — DICLOFENAC 4 G: 10 GEL TOPICAL at 23:03

## 2019-09-01 RX ADMIN — Medication: at 07:50

## 2019-09-01 RX ADMIN — LORAZEPAM 0.5 MG: 2 INJECTION INTRAMUSCULAR; INTRAVENOUS at 10:38

## 2019-09-01 RX ADMIN — OXYCODONE HYDROCHLORIDE 10 MG: 5 TABLET ORAL at 05:45

## 2019-09-01 RX ADMIN — PROPRANOLOL HYDROCHLORIDE 60 MG: 60 CAPSULE, EXTENDED RELEASE ORAL at 07:43

## 2019-09-01 RX ADMIN — RANITIDINE 150 MG: 150 TABLET ORAL at 07:44

## 2019-09-01 RX ADMIN — OXYCODONE HYDROCHLORIDE 10 MG: 5 TABLET ORAL at 21:09

## 2019-09-01 RX ADMIN — TOLTERODINE 4 MG: 4 CAPSULE, EXTENDED RELEASE ORAL at 07:44

## 2019-09-01 RX ADMIN — OXYCODONE HYDROCHLORIDE 10 MG: 5 TABLET ORAL at 11:31

## 2019-09-01 RX ADMIN — SENNOSIDES AND DOCUSATE SODIUM 2 TABLET: 8.6; 5 TABLET ORAL at 07:44

## 2019-09-01 RX ADMIN — OXYCODONE HYDROCHLORIDE 10 MG: 5 TABLET ORAL at 15:51

## 2019-09-01 RX ADMIN — MORPHINE SULFATE 4 MG: 2 INJECTION, SOLUTION INTRAMUSCULAR; INTRAVENOUS at 13:09

## 2019-09-01 RX ADMIN — MORPHINE SULFATE 4 MG: 2 INJECTION, SOLUTION INTRAMUSCULAR; INTRAVENOUS at 09:32

## 2019-09-01 RX ADMIN — PROCHLORPERAZINE EDISYLATE 10 MG: 5 INJECTION INTRAMUSCULAR; INTRAVENOUS at 13:16

## 2019-09-01 RX ADMIN — Medication 5 ML: at 09:45

## 2019-09-01 RX ADMIN — MORPHINE SULFATE 2 MG: 2 INJECTION, SOLUTION INTRAMUSCULAR; INTRAVENOUS at 19:57

## 2019-09-01 RX ADMIN — MORPHINE SULFATE 2 MG: 2 INJECTION, SOLUTION INTRAMUSCULAR; INTRAVENOUS at 06:55

## 2019-09-01 RX ADMIN — DEXAMETHASONE 4 MG: 4 TABLET ORAL at 19:57

## 2019-09-01 RX ADMIN — LIDOCAINE 1 PATCH: 560 PATCH PERCUTANEOUS; TOPICAL; TRANSDERMAL at 07:44

## 2019-09-01 RX ADMIN — Medication: at 13:01

## 2019-09-01 RX ADMIN — Medication 5 ML: at 21:09

## 2019-09-01 RX ADMIN — Medication 5 ML: at 17:46

## 2019-09-01 RX ADMIN — Medication: at 19:57

## 2019-09-01 RX ADMIN — MORPHINE SULFATE 2 MG: 2 INJECTION, SOLUTION INTRAMUSCULAR; INTRAVENOUS at 07:38

## 2019-09-01 RX ADMIN — BUSPIRONE HYDROCHLORIDE 15 MG: 15 TABLET ORAL at 07:44

## 2019-09-01 ASSESSMENT — PAIN DESCRIPTION - DESCRIPTORS
DESCRIPTORS: ACHING;DULL;CONSTANT
DESCRIPTORS: CONSTANT;SHARP
DESCRIPTORS: ACHING;CONSTANT
DESCRIPTORS: ACHING;DULL;CONSTANT
DESCRIPTORS: CONSTANT;SHARP
DESCRIPTORS: ACHING;DULL;CONSTANT

## 2019-09-01 ASSESSMENT — ACTIVITIES OF DAILY LIVING (ADL)
ADLS_ACUITY_SCORE: 18
ADLS_ACUITY_SCORE: 16
ADLS_ACUITY_SCORE: 17
ADLS_ACUITY_SCORE: 16

## 2019-09-01 NOTE — CONSULTS
"INTERVENTIONAL RADIOLOGY CONSULT      IR is consulted for biopsy of lytic lesion on recent PET scan.  This is a 59 year old female with breast cancer.  The scan likely reveals metastatic disease.      -No emergent indication for biopsy after hours.  -IR department will contact patient early next week (9/3 or 9/4) for possible biopsy.  This could be performed as an outpatient.     BP (!) 136/95 (BP Location: Left arm)   Pulse 69   Temp 96.5  F (35.8  C) (Oral)   Resp 18   Ht 1.575 m (5' 2\")   Wt 74.6 kg (164 lb 6.4 oz)   SpO2 97%   BMI 30.07 kg/m    Recent Labs   Lab Test 09/01/19  0605   WBC 6.0   HGB 13.4        Lab Results   Component Value Date    INR 1.09 08/30/2019         Discussed with IR staff, Dr. Potter.      Physician Attestation     I, Ashley Potter, agree with the findings and plan of care as documented in the note.          "

## 2019-09-01 NOTE — CONSULTS
Department of Radiation Oncology                   [facility] [address]  [address] [place]  [address]  Office:  [phone]  Fax:  [phone]   Radiation Oncology [facility]  [address] [place] [address]  Phone:  [phone]  Fax:  [phone]     RE: Shaneka Alvarez : 1960   MRN: [id number] OBIE: 2019     OUTPATIENT VISIT NOTE       PROBLEM: Pathologic fracture of L1      was seen for initial consultation in the Dept of Therapeutic Radiology on 2019 at the request of Dr. Mejia    HISTORY OF PRESENT ILLNESS:   Ms. Alvarez is a 59-year-old woman with a history of multiple sclerosis with associated right-sided weakness which has been stable for years and a history of T2 N1 M0 ER/DE positive, HER-2 negative invasive lobular carcinoma the right breast originally diagnosed in .  Per her medical record, she underwent a right modified radical mastectomy and left prophylactic mastectomy with bilateral latissimus dorsi flap reconstructions on 2006.  Final pathology showed invasive lobular carcinoma, grade 2, measuring 4.5 x 3.8 x 2.5 cm.  She was also found to have  lymph nodes involved.  She was then treated with adjuvant chemotherapy per ECOG 2104 and received dose dense AC with Avastin for 4 cycles followed by dose dense Taxol and Avastin for an additional 4 cycles.  She then continued on Avastin for 1 year, completed 3/15/2007.  She then received adjuvant radiotherapy to the right chest wall and axilla completed 2007.  Radiotherapy was at [facility] in [place], [place].  She then was started on hormonal therapy with Aromasin which was continued through  when she moved to the [facility].    She reports that for the last year she has had progressive worsening upper back pain that is at its worse when she tries to get up from laying down or lay down from being up.  She has managed his pain with ibuprofen and Tylenol and has  been able to continue to go to work.  She works as a .  She had noticed some blurry midrange vision, some variable weakness of the right upper and lower extremity and was seen by a neurologist who ordered a brain MRI.  The brain MRI was concerning for calvarial lesions suggestive of metastatic disease and also a C2 lesion.  She was referred back to oncology who ordered a PET scan on 8/30. This was concerning for a possible pathologic fracture at L1. She was asked to come to the ED for accelerated work-up givnen her increasing back pain and difficulty with mobility.    She underwent an MRI of the thoracic and lumbar spine yesterday which showed numerous enhancing lesions throughout the thoracic spine with the largest involving the posterior elements of T9.  There was no significant spinal canal stenosis and no evidence for spinal cord compression.  In addition there were numerous enhancing lesions in the lumbar and sacral spine as well as the bilateral iliac wings with the largest lesion involving the majority of the vertebral body of L1.  There was likely pathologic compression fracture of L1 with mild epidural extension but no spinal canal stenosis and no evidence for cord compression at this level.  Neurosurgery was consulted and Gorge brace was recommended.  The patient was also started on dexamethasone 10 mg loading dose and 4 mg twice daily.  She continues to report pain in the lumbar spine which is exacerbated by movement.  She has some right lower leg weakness which has been present for over a year and is associated with her MS.  She also has bowel and bladder incontinence that started about 1 year ago.  She otherwise has no other concerning neurologic symptoms.  She is scheduled for radiographs of the spine today to determine whether she has any spinal instability that requires surgical intervention.    PAST MEDICAL HISTORY:   Past Medical History:   Diagnosis Date     Breast cancer (H)      Age 44     Common migraine      H/O bilateral mastectomy      Mild major depression (H)      Multiple sclerosis (H)      Past Surgical History:   Procedure Laterality Date     HC REMOVAL OF OVARY/TUBE(S)       HERNIA REPAIR, UMBILICAL       mastectomy  Bilateral 2006     MASTECTOMY, BILATERAL          CHEMOTHERAPY HISTORY: See HPI    PAST RADIATION THERAPY HISTORY: See HPI     IMPLANTED DEVICES: None    AUTOIMMUNE/CONNECTIVE TISSUE DISORDERS: None    MEDICATIONS:   Current Facility-Administered Medications   Medication     acetaminophen (TYLENOL) tablet 650 mg     bisacodyl (DULCOLAX) EC tablet 5 mg    Or     bisacodyl (DULCOLAX) EC tablet 10 mg    Or     bisacodyl (DULCOLAX) EC tablet 15 mg     busPIRone (BUSPAR) tablet 15 mg     diclofenac (VOLTAREN) 1 % topical gel 4 g     enoxaparin (LOVENOX) injection 40 mg     gadobutrol (GADAVIST) injection 7.46 mL     heparin 100 UNIT/ML injection 5 mL     heparin lock flush 10 UNIT/ML injection 5-10 mL     heparin lock flush 10 UNIT/ML injection 5-10 mL     hydrALAZINE (APRESOLINE) injection 10 mg     ibuprofen (ADVIL/MOTRIN) tablet 600 mg     ketamine 5% gabapentin 8% lidocaine 2.5% topical PLO cream     Lidocaine (LIDOCARE) 4 % Patch 1 patch     lidocaine (LMX4) cream     lidocaine 1 % 0.1-1 mL     lidocaine patch in PLACE     lidocaine patch REMOVAL     LORazepam (ATIVAN) injection 0.5-1 mg     LORazepam (ATIVAN) tablet 0.5 mg     magnesium citrate solution 148 mL     magnesium sulfate 2 g in NS intermittent infusion ([facility]arMEDium or [facility] Cmpd)     magnesium sulfate 4 g in 100 mL sterile water (premade)     melatonin tablet 1 mg     morphine (PF) injection 2-4 mg     naloxone (NARCAN) injection 0.1-0.4 mg     ondansetron (ZOFRAN) injection 8 mg     ondansetron (ZOFRAN) tablet 8 mg     oxyCODONE (ROXICODONE) tablet 5-10 mg     polyethylene glycol (MIRALAX/GLYCOLAX) Packet 17 g     potassium chloride (KLOR-CON) Packet 20-40 mEq     potassium chloride 10 mEq in 100 mL  "intermittent infusion with 10 mg lidocaine     potassium chloride 10 mEq in 100 mL sterile water intermittent infusion (premix)     potassium chloride 20 mEq in 50 mL intermittent infusion     potassium chloride ER (K-DUR/KLOR-CON M) CR tablet 20-40 mEq     prochlorperazine (COMPAZINE) injection 10 mg     prochlorperazine (COMPAZINE) tablet 10 mg     propranolol ER (INDERAL LA) 24 hr capsule 60 mg     ranitidine (ZANTAC) tablet 150 mg     senna-docusate (SENOKOT-S/PERICOLACE) 8.6-50 MG per tablet 1 tablet    Or     senna-docusate (SENOKOT-S/PERICOLACE) 8.6-50 MG per tablet 2 tablet     sodium chloride (PF) 0.9% PF flush 10-20 mL     sodium chloride (PF) 0.9% PF flush 10-20 mL     sodium chloride (PF) 0.9% PF flush 3 mL     sodium chloride (PF) 0.9% PF flush 3 mL     tolterodine ER (DETROL LA) 24 hr capsule 4 mg     venlafaxine (EFFEXOR-XR) 24 hr capsule 225 mg       ALLERGIES:  allergic to bactrim [sulfamethoxazole w/trimethoprim] and copaxone [glatiramer].    SOCIAL HISTORY: Lives in [place], [place] with . Works as a . Former smoker, quit 2005.    FAMILY HISTORY:   family history includes Brain Cancer (age of onset: 20) in her cousin; Breast Cancer (age of onset: 40) in her cousin and maternal grandmother; Breast Cancer (age of onset: 45) in her cousin; Breast Cancer (age of onset: 49) in her mother; Breast Cancer (age of onset: 50) in her maternal aunt, paternal aunt, and paternal grandmother; Melanoma in her mother; Ovarian Cancer in her maternal grandmother.    REVIEW OF SYMPTOMS:  A full 14-point review of systems was performed.     PHYSICAL EXAMINATION:    /73 (BP Location: Left arm)   Pulse 69   Temp 96.5  F (35.8  C) (Oral)   Resp 18   Ht 1.575 m (5' 2\")   Wt 74.6 kg (164 lb 6.4 oz)   SpO2 97%   BMI 30.07 kg/m    General: Alert, oriented, NAD, laying in bed with back brace  HEENT: NCAT, EOMI  Lungs: Breathing comfortably on RA  Abd: Nondistended  Neuro: CNs " grossly intact    ECOG PS: 3 (inpatient)    ASSESSMENT: 59 year old female with pathologic compression fracture of L1 without spinal cord compression and diffuse bone lesions on imaging in context of a remote history of breast cancer    RECOMMENDATIONS:   We had a discussion with Ms. Alvarez and her  regarding the role of radiotherapy in the overall management of her low back pain.  We discussed this time, she appears to have a metastatic malignancy, however, it is not certain that this is related to her prior history of breast cancer and may represent a new malignancy.      She is currently still being evaluated for spinal stability and is scheduled to have spinal radiographs performed today.      If surgery is not indicated, we can consider radiating the lumbar spine with the goal of local control and pain management. This would likely consist of 3000 cGy in 10 fractions over 2 weeks. However, we would prefer to have a tissue diagnosis prior to beginning treatment.  In the event that she develops symptoms consistent with spinal cord compression or cauda equina syndrome, we could consider palliative radiotherapy without a tissue diagnosis, but we prefer to wait for tissue as long as she remains neurologically intact.      The patient lives in [place], [place] and would prefer to receive radiotherapy closer to her home if possible (RT in [place]).  This is a reasonable plan assuming her pain can be controlled on oral pain medications and she is otherwise appropriate for discharge.  If radiotherapy needs to begin while she remains an inpatient, she could remain at the [facility] to complete her treatments as an outpatient. In the meantime we recommend continuing steroids, aggressive pain control, and recommend she be evaluated for tissue biopsy.    Thank you for allowing us to participate in this patient's care.  Please feel free to call with any questions or concerns.    The  patient was seen and discussed with staff, Dr. Huber.     Fei Us MD  Resident, PGY-5  Department of Radiation Oncology  [facility]  [phone]    I was personally present with the resident during the history and exam.  I   discussed the case with the resident and agree with the findings and plan   of care as documented in the resident's note.    Milady Huber   [phone]

## 2019-09-01 NOTE — PROGRESS NOTES
[facility], [place]    Solid Oncology Progress Note      Date of Admission:  8/30/2019  Assessment & Plan       Shaneka Alvarez is a 59 year old female with history of multiple sclerosis which is stable, T2 N1 M0 invasive lobular carcinoma of the right breast that was ER+/VA+ HER-2 negative without BRCA mutations but with a VUS in the MSH2 gene, s/p bilateral mastectomies, admitted after recent findings of metastatic lesions to to skull and to vertebrae with concern for pathologic fracture at L1 and L4 and progressive pain.    TODAY  - Steroids, opioid, and other pain regimen  - NSG consult --> brace, no surgical intervention  - Rad/onc consult for radiation  - IR consult for tissue sample  - Bowel regimen    ## Progressive Severe back pain 2/2 L1 and L4 compression fracture  ## Metastic lesion in skull, cervical and lumbar spine  -- Neurosurgery consulted, no surgical intervention; bracing and ambulate with assistance.   - Rad/onc consult for radiation    - dexamethasone 10 mg IV once today. If effective, can start 4 mg bid for a few days, then taper to 4 mg daily   - encourage oxycodone over IV morphine   - lidocaine patch, diclofenac gel   - bowel regimen      ## Recurrent breast cancer vs. New primary  - history of ER+VA+[id number] T2 N1 M0 breast cancer s/p chemo and radiation in [id number], s/p R mod radical mastectomy and L prophylactic mastectomy in 2016.  Finished 7 years of exemastone until 2014.   - Per verbal reading from radiology, R lateral iliac wing may be a good target for biopsy.  IR consult placed.      ## subacute productive cough without fevers, chills, night sweats  ## left lung nodule with GGO  Not coughing at present time but will order sputum cultures in case she brings up anything. In absence of any additional symptoms, signs, or labs concerning for infection will not do a full infection workup as of yet.  sputum culture & gram stain ordered.  Procal is  negative.     --- CHRONIC ISSUES ---  ## MS with right UE and LE weakness  ## Migraine  In particular weak in right lower leg with weakness of hip flexion, ankle dorsiflexion, and toe dorsiflexion without actual evidence of foot drop. Recent Brain imaging without evidence of new MS lesions.  -- propranolol and continued for migraine but aimovig, magnesium held for now  -- Not on any controller med for MS.   ## overactive bladder. Continue tolterodine  ## depression. Continue venlafaxine, buspar     FEN: regular diet  Prophylaxis: enox  Code: FULL  Disposition: 1-2 days based on pain control, radiation inpatient vs. Outpatient, and biopsy in this stay vs. Outpatient.    The patient's care was discussed with the Attending Physician, Dr. Mejia.    Gabe Lewis DO, MS [facility]  [phone]  ______________________________________________________________________    Interval History   Back and rib pain is improved but not well controlled -- needing IV morphine.   No chest pain, sob, abd pain.     Data reviewed today: I reviewed all medications, new labs and imaging results over the last 24 hours.    Physical Exam   Vital Signs: Temp: 96.5  F (35.8  C) Temp src: Oral BP: 123/73 Pulse: 69 Heart Rate: 89 Resp: 18 SpO2: 97 % O2 Device: None (Room air)    Weight: 164 lbs 6.4 oz  General Appearance: Uncomfortable if moves, laying in bed with brace on  Respiratory: CTAB on RA  Cardiovascular: RRR  GI: Soft, NTND  Skin: No rash    Data   24 hour data reviewed

## 2019-09-01 NOTE — PLAN OF CARE
VS stable, afebrile. Pt c/o lower back pain - 10mg PO oxy and 2mg IV morphine administered x1. Pt denied SOB/n/v. Scopolamine patch on as ordered. Pt urine output in bedpan ~300cc - PVR bladder scan 63cc. Back brace at bedside. No significant events, pt in bed all shift. Continue POC.

## 2019-09-01 NOTE — PLAN OF CARE
Discharge Planner PT  Pt 7D  Patient plan for discharge: Home with assist  Current status: PT order for evaluation and treatment acknowledged. PT evaluation completed and treatment initiated. Reviewed donning of [place] brace with spouse and pt and this was completed appropriately. Pt rolls in bed with moderate assist of 1 without brace on. Right LE strength 4-/5 for hip flexors and knee extensors, 4/5 for ankle dorsiflexor. Pt reports constant, aching pain at rest that increases during activity. Pt transfers with moderate to minimal assist of 1. Pt ambulated with FWW and SBA of 1 ~ 20' x 2 to and from bathroom. Pt instructed in toilet transfers. Pt report dizziness when sitting/standing; dizziness and nausea occurred when ambulating back to bed (history of vertigo). RN advised that staff can transfer and ambulate pt to bathroom with assist of 1 and brace on. Strongly recommend that an OT order for evaluation and treatment be issued to assist with ADLs and needed plans for return home.  Barriers to return to prior living situation: Medical condition, assist with transfers, decreased ambulatory ability and endurance, decreased functional activity.  Recommendations for discharge: Home with assist and HH PT/OT   Rationale for recommendations: Continued rehab needed to progress transfer training, gait training with least restrictive device, functional endurance, and safety in the home.       Entered by: Romario Gamble 09/01/2019 3:36 PM

## 2019-09-01 NOTE — PLAN OF CARE
[id number]  Status: Patient admitted w/ R breast cancer w/ mets to skull and vertebrae w/ L1 compression fx.  VS: VSS on RA.  Neuros: A&Ox4. Denies N/T. No deficits noticed in strengths.  GI/: Poor appetite w/ regular diet. LBM 8/29. Bladder scanned @ 2200 for 8 ml. Encouraging PO intake. Emesis x1. Zofran given x1 w/ relief.   IV: L Port infusing w/ NS @ 100 ml/hr.  Activity: Bedrest. Pt to wear brace when OOB.  Pain: Back pain moderately controlled w/ scheduled Ketamine cream and Voltaren gel. PRN Oxy x1 and IV Morphine x1. Aqua K pad in place.   Respiratory/Trach: No issues.  Skin: Intact.  Plan of Care: Continue to manage pain and follow POC. MRI of spine scheduled for tomorrow.

## 2019-09-01 NOTE — PROGRESS NOTES
"S: tiago obtained yesterday     O:  Temp:  [96  F (35.6  C)-96.8  F (36  C)] 96.5  F (35.8  C)  Pulse:  [69-74] 69  Heart Rate:  [89] 89  Resp:  [16-18] 18  BP: (123-151)/(63-97) 123/73  SpO2:  [92 %-99 %] 97 %    Exam:  General: Awake;  Alert, In No Acute Distress  Pulm: Breathing Comfortably   Mental status: Oriented x 3  Cranial Nerves: face symmetric. No dysarthria.   Strength: 5/5 in LUE and LLE. 4/5 in RUE and RLE  Pronator Drift: Absent  Sensory: Intact to Light Touch    Assessment:   Diffuse breast cancer metastases with most notable lesion being L1 pathologic fracture.     Plan:   No neurosurgical intervention indicated at this time   Upright XR in [place] brace  Activity orders will be modified after completion of upright XR   MRI of C spine  Ok for regular diet at this time  Rad-onc consultation  DVT: SCDs while in bed    Khan \"Drew\" MD [place]   Neurosurgery, PGY-3    Please contact neurosurgery resident on call with questions.    Dial * * [phone], enter [phone] when prompted.       "

## 2019-09-01 NOTE — PROGRESS NOTES
09/01/19 1319   Quick Adds   Type of Visit Initial PT Evaluation   Living Environment   Lives With spouse   Living Arrangements house   Home Accessibility no concerns   Transportation Anticipated family or friend will provide   Living Environment Comment Deep tub/shower, suction cup grab bar, permanent seat at back of tub   Self-Care   Usual Activity Tolerance excellent   Current Activity Tolerance poor   Regular Exercise No   Equipment Currently Used at Home walker, rolling;cane, straight;grab bar, tub/shower   Activity/Exercise/Self-Care Comment Works full-time as teacher, gardens and travel in the summer   Functional Level Prior   Ambulation 0-->independent   Transferring 0-->independent   Toileting 0-->independent   Bathing 0-->independent   Communication 0-->understands/communicates without difficulty   Swallowing 2-->difficulty swallowing liquids/foods   Cognition 0 - no cognition issues reported   Fall history within last six months yes   Number of times patient has fallen within last six months 3   Which of the above functional risks had a recent onset or change? ambulation;transferring;toileting   Prior Functional Level Comment uses cane occasionally   General Information   Onset of Illness/Injury or Date of Surgery - Date 08/30/19   Referring Physician Jake Gilmore MD    Patient/Family Goals Statement Return home   Pertinent History of Current Problem (include personal factors and/or comorbidities that impact the POC) Shaneka Alvarez is a 59 year old female with history of multiple sclerosis which is stable, T2 N1 M0 invasive lobular carcinoma of the right breast that was ER+/[place]+ HER-2 negative without BRCA mutations but with a VUS in the MSH2 gene, s/p bilateral mastectomies, admitted today after recent findings of metastatic lesions to to skull and to vertebrae with concern for pathologic fracture at L1 and L4 and progressive pain. MRI results indicate compression fracture of L1 along with  numerous lesions in the thoracic spine and at C2 and in the skull suggestive of metastatic lesions..Pt has history of vertigo since childhood.   Precautions/Limitations spinal precautions;fall precautions   General Observations Pt reports low back pain at 6/10 at rest with increased pain with movement.   General Info Comments Activity: ambulate with assist; Vendor brace on when HOB > 30 degrees and when out of bed.   Cognitive Status Examination   Orientation orientation to person, place and time   Level of Consciousness alert   Follows Commands and Answers Questions 100% of the time   Personal Safety and Judgment intact   Memory impaired   Cognitive Comment Short term memory difficulty   Pain Assessment   Patient Currently in Pain Yes, see Vital Sign flowsheet   Integumentary/Edema   Integumentary/Edema no deficits were identifed   Posture    Posture Protracted shoulders   Range of Motion (ROM)   ROM Comment LE ROM is normal bilaterally   Strength   Strength Comments Right hip flexion 4-/5, knee extension 4-/5, ankle dorsiflexion 4/5   Bed Mobility   Bed Mobility Comments Rolls with assist of 1; dons [place] brace with assist of 1 by rolling in bed.   Transfer Skills   Transfer Comments Pt transferred supine to sitting and sit to stand with minimal assist of 1. Dizzy when sitting and standing.   Gait   Gait Comments Pt ambulated with FWW and SBA of 1 ~ 20' to bathroom, transferred to toilet with minimal assist of 1, and ambulated back to bed ~ 20'. During the final 10', pt reported nausea and dizziness.   Balance   Balance Comments Good balance.   Sensory Examination   Sensory Perception Comments Numbness of bottoms of both feet with tingling in toes.   General Therapy Interventions   Planned Therapy Interventions bed mobility training;gait training;orthotic fitting/training;strengthening;transfer training;risk factor education;home program guidelines;progressive activity/exercise   Clinical Impression   Criteria  "for Skilled Therapeutic Intervention yes, treatment indicated   PT Diagnosis Impaired functional mobility, difficulty walking   Influenced by the following impairments L1 compression fracture, lesion of thoracic and cervical spines, vertigo   Functional limitations due to impairments Bed mobility, transfers, ambulation   Clinical Presentation Evolving/Changing   Clinical Presentation Rationale Chart review   Clinical Decision Making (Complexity) Low complexity   Therapy Frequency Daily   Predicted Duration of Therapy Intervention (days/wks) 1 week   Anticipated Equipment Needs at Discharge front wheeled walker   Anticipated Discharge Disposition Home with Home Therapy   Risk & Benefits of therapy have been explained Yes   Patient, Family & other staff in agreement with plan of care Yes   Clinical Impression Comments Recommend OT evaluation.   [facility] TM \"6 Clicks\"   2016, Trustees of [facility], under license to [employer].  All rights reserved.   6 Clicks Short Forms Basic Mobility Inpatient Short Form   [facility]  \"6 Clicks\" V.2 Basic Mobility Inpatient Short Form   1. Turning from your back to your side while in a flat bed without using bedrails? 2 - A Lot   2. Moving from lying on your back to sitting on the side of a flat bed without using bedrails? 3 - A Little   3. Moving to and from a bed to a chair (including a wheelchair)? 3 - A Little   4. Standing up from a chair using your arms (e.g., wheelchair, or bedside chair)? 3 - A Little   5. To walk in hospital room? 3 - A Little   6. Climbing 3-5 steps with a railing? 2 - A Lot   Basic Mobility Raw Score (Score out of 24.Lower scores equate to lower levels of function) 16   Total Evaluation Time   Total Evaluation Time (Minutes) 10     "

## 2019-09-01 NOTE — PROGRESS NOTES
Pt was given 8 mg IV Zofran before leaving in bed to go to XRAY and then MRI. Xray was performed but Unable to perform MRI 2/2 to nausea. Pt sent back to unit 7D

## 2019-09-01 NOTE — PLAN OF CARE
Neuro: A&Ox4. Tingling present in feet as baseline. Vertigo. (Scopolamine patches dont work)  Cardiac:  VSS.   Respiratory: Sating 95% on RA. Lungs clear diminished/crackles. Productive cough. Instructed on IS   GI/: Adequate urine output. Using bathroom. No BM  Diet/appetite: Tolerating regular diet. Fair PO. Intermittent nausea. Zofran, Compazine and Ativan used.   Activity:  Assist of 1,walker, gait belt and Back Brace. Ambulated x 2.   Pain: Back pain continues. IV Morphine and PO Oxy given.   Skin: No new deficits noted.  LDA's:  PORT hep locked.   Spinal Xray done today. Was unable to have MRI today 2/2 to Vertigo which caused Nausea.     Plan: Continue with POC. Notify primary team with changes. Will have a biopsy of back lesion sometime this week in IR. Radiation consult done today.

## 2019-09-02 ENCOUNTER — APPOINTMENT (OUTPATIENT)
Dept: MRI IMAGING | Facility: CLINIC | Age: 59
DRG: 478 | End: 2019-09-02
Attending: STUDENT IN AN ORGANIZED HEALTH CARE EDUCATION/TRAINING PROGRAM
Payer: COMMERCIAL

## 2019-09-02 ENCOUNTER — APPOINTMENT (OUTPATIENT)
Dept: OCCUPATIONAL THERAPY | Facility: CLINIC | Age: 59
DRG: 478 | End: 2019-09-02
Payer: COMMERCIAL

## 2019-09-02 LAB
BACTERIA SPEC CULT: NORMAL
SPECIMEN SOURCE: NORMAL

## 2019-09-02 PROCEDURE — 99232 SBSQ HOSP IP/OBS MODERATE 35: CPT | Mod: GC | Performed by: INTERNAL MEDICINE

## 2019-09-02 PROCEDURE — 25000128 H RX IP 250 OP 636: Performed by: EMERGENCY MEDICINE

## 2019-09-02 PROCEDURE — 25000132 ZZH RX MED GY IP 250 OP 250 PS 637: Performed by: STUDENT IN AN ORGANIZED HEALTH CARE EDUCATION/TRAINING PROGRAM

## 2019-09-02 PROCEDURE — A9585 GADOBUTROL INJECTION: HCPCS | Performed by: INTERNAL MEDICINE

## 2019-09-02 PROCEDURE — 97165 OT EVAL LOW COMPLEX 30 MIN: CPT | Mod: GO

## 2019-09-02 PROCEDURE — 25000132 ZZH RX MED GY IP 250 OP 250 PS 637: Performed by: INTERNAL MEDICINE

## 2019-09-02 PROCEDURE — 25000128 H RX IP 250 OP 636: Performed by: INTERNAL MEDICINE

## 2019-09-02 PROCEDURE — 12000001 ZZH R&B MED SURG/OB UMMC

## 2019-09-02 PROCEDURE — 25000131 ZZH RX MED GY IP 250 OP 636 PS 637: Performed by: INTERNAL MEDICINE

## 2019-09-02 PROCEDURE — 97535 SELF CARE MNGMENT TRAINING: CPT | Mod: GO

## 2019-09-02 PROCEDURE — 25000132 ZZH RX MED GY IP 250 OP 250 PS 637: Performed by: PHYSICIAN ASSISTANT

## 2019-09-02 PROCEDURE — 25500064 ZZH RX 255 OP 636: Performed by: INTERNAL MEDICINE

## 2019-09-02 PROCEDURE — 72156 MRI NECK SPINE W/O & W/DYE: CPT

## 2019-09-02 RX ORDER — MORPHINE SULFATE 15 MG/1
15 TABLET, FILM COATED, EXTENDED RELEASE ORAL EVERY 12 HOURS SCHEDULED
Status: DISCONTINUED | OUTPATIENT
Start: 2019-09-02 | End: 2019-09-06

## 2019-09-02 RX ORDER — POLYETHYLENE GLYCOL 3350 17 G/17G
17 POWDER, FOR SOLUTION ORAL DAILY
Status: DISCONTINUED | OUTPATIENT
Start: 2019-09-02 | End: 2019-09-03

## 2019-09-02 RX ORDER — GADOBUTROL 604.72 MG/ML
7.5 INJECTION INTRAVENOUS ONCE
Status: COMPLETED | OUTPATIENT
Start: 2019-09-02 | End: 2019-09-02

## 2019-09-02 RX ADMIN — VENLAFAXINE HYDROCHLORIDE 225 MG: 75 CAPSULE, EXTENDED RELEASE ORAL at 08:24

## 2019-09-02 RX ADMIN — MORPHINE SULFATE 4 MG: 2 INJECTION, SOLUTION INTRAMUSCULAR; INTRAVENOUS at 03:42

## 2019-09-02 RX ADMIN — POLYETHYLENE GLYCOL 3350 17 G: 17 POWDER, FOR SOLUTION ORAL at 14:14

## 2019-09-02 RX ADMIN — ZOLPIDEM TARTRATE 5 MG: 5 TABLET, FILM COATED ORAL at 22:21

## 2019-09-02 RX ADMIN — MORPHINE SULFATE 15 MG: 15 TABLET, EXTENDED RELEASE ORAL at 19:59

## 2019-09-02 RX ADMIN — RANITIDINE 150 MG: 150 TABLET ORAL at 19:59

## 2019-09-02 RX ADMIN — Medication: at 20:44

## 2019-09-02 RX ADMIN — DICLOFENAC 4 G: 10 GEL TOPICAL at 22:20

## 2019-09-02 RX ADMIN — BUSPIRONE HYDROCHLORIDE 15 MG: 15 TABLET ORAL at 19:59

## 2019-09-02 RX ADMIN — LIDOCAINE 1 PATCH: 560 PATCH PERCUTANEOUS; TOPICAL; TRANSDERMAL at 08:15

## 2019-09-02 RX ADMIN — Medication 5 ML: at 12:23

## 2019-09-02 RX ADMIN — Medication: at 12:24

## 2019-09-02 RX ADMIN — DEXAMETHASONE 4 MG: 4 TABLET ORAL at 08:22

## 2019-09-02 RX ADMIN — TOLTERODINE 4 MG: 4 CAPSULE, EXTENDED RELEASE ORAL at 08:23

## 2019-09-02 RX ADMIN — ENOXAPARIN SODIUM 40 MG: 40 INJECTION SUBCUTANEOUS at 12:24

## 2019-09-02 RX ADMIN — LORAZEPAM 0.5 MG: 2 INJECTION INTRAMUSCULAR; INTRAVENOUS at 12:22

## 2019-09-02 RX ADMIN — OXYCODONE HYDROCHLORIDE 10 MG: 5 TABLET ORAL at 22:21

## 2019-09-02 RX ADMIN — RANITIDINE 150 MG: 150 TABLET ORAL at 08:23

## 2019-09-02 RX ADMIN — SENNOSIDES AND DOCUSATE SODIUM 2 TABLET: 8.6; 5 TABLET ORAL at 19:59

## 2019-09-02 RX ADMIN — Medication 5 ML: at 19:59

## 2019-09-02 RX ADMIN — Medication 5 ML: at 03:43

## 2019-09-02 RX ADMIN — SENNOSIDES AND DOCUSATE SODIUM 2 TABLET: 8.6; 5 TABLET ORAL at 08:23

## 2019-09-02 RX ADMIN — Medication: at 16:05

## 2019-09-02 RX ADMIN — MORPHINE SULFATE 4 MG: 2 INJECTION, SOLUTION INTRAMUSCULAR; INTRAVENOUS at 08:06

## 2019-09-02 RX ADMIN — DICLOFENAC 4 G: 10 GEL TOPICAL at 14:14

## 2019-09-02 RX ADMIN — OXYCODONE HYDROCHLORIDE 10 MG: 5 TABLET ORAL at 14:14

## 2019-09-02 RX ADMIN — MORPHINE SULFATE 4 MG: 2 INJECTION, SOLUTION INTRAMUSCULAR; INTRAVENOUS at 10:35

## 2019-09-02 RX ADMIN — DEXAMETHASONE 4 MG: 4 TABLET ORAL at 19:59

## 2019-09-02 RX ADMIN — MORPHINE SULFATE 4 MG: 2 INJECTION, SOLUTION INTRAMUSCULAR; INTRAVENOUS at 12:22

## 2019-09-02 RX ADMIN — MORPHINE SULFATE 4 MG: 2 INJECTION, SOLUTION INTRAMUSCULAR; INTRAVENOUS at 16:04

## 2019-09-02 RX ADMIN — PROPRANOLOL HYDROCHLORIDE 60 MG: 60 CAPSULE, EXTENDED RELEASE ORAL at 08:22

## 2019-09-02 RX ADMIN — BUSPIRONE HYDROCHLORIDE 15 MG: 15 TABLET ORAL at 08:22

## 2019-09-02 RX ADMIN — DICLOFENAC 4 G: 10 GEL TOPICAL at 10:35

## 2019-09-02 RX ADMIN — Medication: at 08:16

## 2019-09-02 RX ADMIN — MORPHINE SULFATE 4 MG: 2 INJECTION, SOLUTION INTRAMUSCULAR; INTRAVENOUS at 19:59

## 2019-09-02 RX ADMIN — GADOBUTROL 7.5 ML: 604.72 INJECTION INTRAVENOUS at 13:37

## 2019-09-02 ASSESSMENT — PAIN DESCRIPTION - DESCRIPTORS
DESCRIPTORS: ACHING;DULL;CONSTANT

## 2019-09-02 ASSESSMENT — ACTIVITIES OF DAILY LIVING (ADL)
ADLS_ACUITY_SCORE: 15
ADLS_ACUITY_SCORE: 17
ADLS_ACUITY_SCORE: 15
ADLS_ACUITY_SCORE: 17

## 2019-09-02 ASSESSMENT — MIFFLIN-ST. JEOR: SCORE: 1283.06

## 2019-09-02 NOTE — PLAN OF CARE
OT/7D:  Discharge Planner OT   Patient plan for discharge: Home with assist. Open to therapies if indicated.   Current status: OT evaluation completed and treatment initiated. Patient educated on spinal precautions/optimizing body mechanics and application to safe ADL activity. Dons brace in supine with supervision and assist from spouse. Completes log roll to EOB with Min-Mod A to upright trunk. Ambulates to bathroom with SBA-CGA and 2WW; scissoring gait noted. SBA for toileting tasks. Educated on role of OT, establishing POC, and introduced to EC/FM strategies. BP elevated 150/90's upon return from bathroom. RN aware.   Barriers to return to prior living situation: Medical Status, Pain, Precautions, Balance, Endurance, Fatigue  Recommendations for discharge: Anticipate will progress to home with assist and home therapies. Pending progress while inpatient.   Rationale for recommendations: Patient would benefit from OT to maximize ADL independence and safety within spinal precautions, educate on AE options, and promote EC/FM strategies. Currently fatigues very quickly with somewhat scissoring gait.        Entered by: Lu Diez 09/02/2019 12:42 PM

## 2019-09-02 NOTE — PROGRESS NOTES
"CLINICAL NUTRITION SERVICES - ASSESSMENT NOTE     Nutrition Prescription    RECOMMENDATIONS FOR MDs/PROVIDERS TO ORDER:  Continue with regular diet as tolerated     Malnutrition Status:    Patient does not meet two of the above criteria necessary for diagnosing malnutrition but is at risk for malnutrition    Recommendations already ordered by Registered Dietitian (RD):  Boost plus BID ( 2:00 pm an d HS)     Future/Additional Recommendations:  None      REASON FOR ASSESSMENT  Shaneka Alvarez is a/an 59 year old female assessed by the dietitian for Provider Order - Poor PO intake, education on increasing caloric intake    Chart reviewed:  --PMH: Multiple sclerosis with right UE and LE weakness,  which is stable per chart review. Also history of breast cancer [id number]. S/P bilateral mastectomies, Chemo, Radiation    --Admitted after recent findings of metastatic lesions to skull and to vertebrae along with progressive back pain.    - Plan to start radiation per MD note, bx (prob target iliac bone) and final read of PET/CT. No surgical intervention noted     Progressive severe back pain   #L1 compression fracture, brace on ( Progressive back pain started 1 year ago)  #Metastic lesion in skull, thoracic and lumbar spine    NUTRITION HISTORY  Patient reports a poor po intake PTA, time line not specified. Attributed to back pain.     CURRENT NUTRITION ORDERS  Diet: Regular  Intake/Tolerance: per RN note, patient is tolerating her regular diet. Has fair po intake with intermittent nausea. Ate 100% of her lunch tray today.     Patient reports fair po and appetite. She would like to trial of Ensure/Boost supplements. She has tried them before when appetite was low and it helped.     LABS  Labs reviewed- unremarkable BMP    MEDICATIONS  Medications reviewed    ANTHROPOMETRICS  Height: 157.5 cm (5' 2\")  Most Recent Weight: 74.5 kg (164 lb 3.2 oz)(scale #2) on 9/2 and 74.6 kg admit wt on 8/30    IBW: 50 kg ( 149% IBW)   BMI: " 30.03 kg /m2 Obesity Grade I BMI 30-34.9  Weight History: 2 kg wt loss over the past 1.5 month ( 2.6% net wt loss in about 2 month )   Wt Readings from Last 10 Encounters:   09/02/19 74.5 kg (164 lb 3.2 oz)   08/15/19 75.4 kg (166 lb 4.8 oz)   07/16/19 76.5 kg (168 lb 9.6 oz)   07/31/18 78.5 kg (173 lb)   07/24/18 78.5 kg (173 lb)   07/11/18 78.5 kg (173 lb)   07/10/18 78.8 kg (173 lb 12.8 oz)   07/11/17 88.7 kg (195 lb 9.6 oz)   12/29/16 90.3 kg (199 lb)   08/25/16 87 kg (191 lb 12.8 oz)     Dosing Weight: 56 kg adjusted wt from admission wt of 74.6 kg and IBW of 50 kg     ASSESSED NUTRITION NEEDS  Estimated Energy Needs:3929-4065 kcals/day (25 - 30 kcals/kg)  Justification: Maintenance  Estimated Protein Needs: 56-67 grams protein/day (1 - 1.2 grams of pro/kg)  Justification: Maintenance  Estimated Fluid Needs: (1 mL/kcal)   Justification: Maintenance    PHYSICAL FINDINGS  No lower extremity edema     MALNUTRITION  % Intake: </=75% for >/= 1 month (severe)  % Weight Loss: Weight loss does not meet criteria  Subcutaneous Fat Loss: None observed  Muscle Loss: None observed  Fluid Accumulation/Edema: None noted  Malnutrition Diagnosis: Patient does not meet two of the above criteria necessary for diagnosing malnutrition but is at risk for malnutrition    NUTRITION DIAGNOSIS  Predicted inadequate nutrient intake related to progressive pain and weakness, may hinder sufficient PO intake     INTERVENTIONS  Implementation  Nutrition Education:Reviewed oral supplement availabilities and encouraged intake if low po and appetite  Ordered boost plus BID     Goals  Patient to consume % of nutritionally adequate meal trays TID, or the equivalent with supplements/snacks.     Monitoring/Evaluation  Progress toward goals will be monitored and evaluated per protocol.    Mohamud Lee RD/JOANN  Pager [phone]

## 2019-09-02 NOTE — PROGRESS NOTES
[facility] acute hospital, [place]    Hematology / Oncology Progress Note    Date of Service (when I saw the patient): 09/02/2019     Assessment & Plan   Shaneka Alvarez is a 59 year old female with history of multiple sclerosis which is stable, T2 N1 M0 invasive lobular carcinoma of the right breast that was ER+/NE+ HER-2 negative s/p bilateral mastectomies, admitted after recent findings of metastatic lesions to to skull and to vertebrae along with progressive back pain.     CHANGES TODAY:  - Add MS Contin 15 mg BID  - C spine MRI (will give Morphine/Ativan prior)  - IR --> bx is non emergent, so rec outpatient. Since rad onc would prefer bx prior to XRT, will discuss with day team on 9/3 if can be done inpatient  - Rad Onc --> pending bx, will attempt to get simulation 9/3 AM  - NSG --> no surgical intervention, wear [place] brace     #Progressive severe back pain   #L1 compression fracture  #Metastic lesion in skull, thoracic and lumbar spine  MRI brain 8/21/19 with multiple enhancing calvarial lesions (a similar single lesion in C2 vertebral body), new since 7/10/2018 and consistent metastatic disease. T/L spine MRI 8/31/19 with numerous enhancing lesions of the T and L spine, largest around T9 and L1. No evidence of cord compression.  - Try to obtain MRI C spine 9/2  - Rad Onc consulted--> pending bx, will attempt to get simulation 9/3 AM. Planning 10 fractions of XRT to L spine. Pt agreeable to completing XRT at [facility] and staying locally.  - NSG consulted--> no surgical intervention, wear [place] brace when out of bed.   - Continue Dex 4 mg BID  - MS Contin 15 mg BID (x9/2). Continue IV Morphine, PO Oxycodone.      #Recurrent breast cancer vs. New primary  History of ER+NE+FPD2aff T2 N1 M0 breast cancer s/p chemo and radiation in 9152-0589, s/p R mod radical mastectomy and L prophylactic mastectomy in 2016.  Finished 7 years of exemastone until 2014.   - IR consulted --> bx is non emergent, so rec  outpatient. Since rad onc would prefer bx prior to XRT, will discuss with day team on 9/3 if can be done inpatient. Per verbal reading from radiology, R lateral iliac wing may be a good target for biopsy.  - PET scan 8/30/19, read pending     #Subacute productive cough without fevers, chills, night sweats  #Left lung nodule with GGO  Not coughing at present time but will order sputum cultures in case she brings up anything. In absence of any additional symptoms, signs, or labs concerning for infection will not do a full infection workup as of yet. Sputum culture & gram stain ordered. Procal is negative.      --- CHRONIC ISSUES ---  #MS with right UE and LE weakness  #Migraine  In particular weak in right lower leg with weakness of hip flexion, ankle dorsiflexion, and toe dorsiflexion without actual evidence of foot drop. Recent Brain imaging without evidence of new MS lesions.  - Continue Propranolol, magnesium held for now  - Not on any controller med for MS.   #Overactive bladder: Continue tolterodine  #Depression: Continue venlafaxine, buspar     FEN: regular diet  Prophylaxis: Lovenox 40 mg every day, Ranitidine 150 mg BID  Code: FULL  Disposition: 2-3 days based on pain control, radiation and biopsy coordination.     The patient's care was discussed with the Attending Physician, Dr. Mejia.    Jeannie ([facility] Center[facility] Bucio PA-C  Hematology/Oncology  Pager: [phone]     Interval History   Still in pain this morning. IV Morphine works best. Improves her pain but does not make it go away completely. Walked to the RN station yesterday with the brace on and went well, but was subsequently very fatigued. No SOB, chest pain, abdominal pain. No BM, but passing gas.    Physical Exam   Temp: 96.5  F (35.8  C) Temp src: Oral BP: (!) 142/96(Pt. having pain)   Heart Rate: 78 Resp: 18 SpO2: 95 % O2 Device: None (Room air)    Vitals:    08/30/19 1842 09/02/19 0800   Weight: 74.6 kg (164 lb 6.4 oz) 74.5 kg (164 lb 3.2 oz)      Vital Signs with Ranges  Temp:  [96.2  F (35.7  C)-96.7  F (35.9  C)] 96.5  F (35.8  C)  Heart Rate:  [71-87] 78  Resp:  [18-20] 18  BP: (120-142)/(85-96) 142/96  SpO2:  [92 %-99 %] 95 %  I/O last 3 completed shifts:  In: 520 [P.O.:520]  Out: 2850 [Urine:2850]    Constitutional: Pleasant female seen laying in bed. No apparent distress, and appears stated age.  Eyes: Lids and lashes normal, sclera clear, conjunctiva normal.  ENT: Normocephalic, oral cavity without erythema or exudates, gums normal and good dentition.   Respiratory: No increased work of breathing, good air exchange, clear to auscultation bilaterally, no crackles or wheezing.  Cardiovascular: Regular rate and rhythm, normal S1 and S2, and no murmur noted.  GI: No masses or scars. +BS. Soft. No tenderness on palpation.  Skin: Limited bruising, no bleeding, no redness, no warmth, no rashes, no lesions, no jaundice.  Extremities: There is no redness, warmth, or swelling of the joints. No lower extremity edema. No cyanosis.  Neurologic: Awake, alert, oriented to name, place and time.    Vascular access: PIV    Medications       busPIRone  15 mg Oral BID     dexamethasone  4 mg Oral Q12H TIP     diclofenac  4 g Transdermal 4x Daily     enoxaparin  40 mg Subcutaneous Q24H     gadobutrol  0.1 mL/kg Intravenous Once     heparin  5 mL Intracatheter Q28 Days     heparin lock flush  5-10 mL Intracatheter Q24H     ketamine 5% gabapentin 8% lidocaine 2.5%   Topical 4x Daily     lidocaine  1 patch Transdermal Q24H     lidocaine   Transdermal Q8H     lidocaine   Transdermal Q24h     morphine  15 mg Oral Q12H TIP     polyethylene glycol  17 g Oral Daily     propranolol ER  60 mg Oral Daily     ranitidine  150 mg Oral BID     senna-docusate  1 tablet Oral BID    Or     senna-docusate  2 tablet Oral BID     sodium chloride (PF)  3 mL Intracatheter Q8H     tolterodine ER  4 mg Oral Daily     venlafaxine  225 mg Oral Daily       Data   ROUTINE IP LABS (Last four  results)  BMP  Recent Labs   Lab 09/01/19  0605 08/30/19 2015    134   POTASSIUM 4.0 3.6   CHLORIDE 106 100   RAFAELA 8.3* 8.8   CO2 22 26   BUN 14 9   CR 0.72 0.79   GLC 91 92     CBC  Recent Labs   Lab 09/01/19 0605 08/30/19 2015   WBC 6.0 4.6   RBC 4.79 4.94   HGB 13.4 14.0   HCT 43.4 43.0   MCV 91 87   MCH 28.0 28.3   MCHC 30.9* 32.6   RDW 12.5 12.7    202     INR  Recent Labs   Lab 08/30/19 2015   INR 1.09

## 2019-09-02 NOTE — PROGRESS NOTES
"   09/02/19 1200   Quick Adds   Type of Visit Initial Occupational Therapy Evaluation   Living Environment   Lives With spouse   Living Arrangements house   Home Accessibility no concerns   Transportation Anticipated family or friend will provide   Living Environment Comment Patient lives with her  in a house. No stairs needed to be completed. Has a deep tub/shower combination with built-in seat in back. Uses suction cup grab bar.    Self-Care   Usual Activity Tolerance good   Current Activity Tolerance poor   Regular Exercise No   Equipment Currently Used at Home cane, straight;grab bar, tub/shower;walker, rolling   Activity/Exercise/Self-Care Comment Patient endorses no formal exercise program. Works full time as a . Enjoys staying active by gardening and traveling. A few weeks prior to hospitalization, was able to ambulate around community store with cart support, but endorses fatiguing quickly.   Functional Level   Ambulation 0-->independent   Transferring 0-->independent   Toileting 0-->independent   Bathing 1-->assistive equipment  (Suction cup grab bar)   Dressing 0-->independent   Eating 0-->independent   Communication 0-->understands/communicates without difficulty   Cognition 1 - attention or memory deficits   Fall history within last six months yes   Number of times patient has fallen within last six months 3   Which of the above functional risks had a recent onset or change? ambulation;transferring;toileting;bathing;dressing   Prior Functional Level Comment Patient has cane available for occasional use.    General Information   Onset of Illness/Injury or Date of Surgery - Date 08/30/19   Referring Physician Jeannie Yañez PA-C   Patient/Family Goals Statement Did not specify.    Additional Occupational Profile Info/Pertinent History of Current Problem Per chart: \"Shaneka Alvarez is a 59 year old female with history of multiple sclerosis which is stable, T2 N1 M0 invasive " "lobular carcinoma of the right breast that was ER+/WV+ HER-2 negative without BRCA mutations but with a VUS in the MSH2 gene, s/p bilateral mastectomies, admitted after recent findings of metastatic lesions to to skull and to vertebrae with concern for pathologic fracture at L1 and L4 and progressive pain.\"   Precautions/Limitations fall precautions;spinal precautions   Weight-Bearing Status - LUE partial weight-bearing (% in comments)  (10 lb. lifting restriction - spinal precautions)   Weight-Bearing Status - RUE partial weight-bearing (% in comments)  (10 lb. lifting restriction - spinal precautions)   General Observations Patient greeted supine in bed.  present. Pleasant and cooperative.   General Info Comments Activity orders: Ambulate with assist. Gorge when HOB greater than 30 degrees. Verbally OK'd with neurosurg resident OK for activity given plan for C spine MRI this PM.    Cognitive Status Examination   Orientation orientation to person, place and time   Cognitive Comment Patient appropriate in conversation. Endorses some STM deficits recently.   Visual Perception   Visual Perception Wears glasses   Visual Perception Comments Patient endorsing increased blurry vision recently.   Sensory Examination   Sensory Comments Numbness/tingling in bilateral feet/hands.   Pain Assessment   Patient Currently in Pain Yes, see Vital Sign flowsheet   Range of Motion (ROM)   ROM Comment UE appears WFL.    Strength   Strength Comments Not tested due to spinal precautions.    Transfer Skill: Bed to Chair/Chair to Bed   Level of [place]: Bed to Chair stand-by assist   Transfer Skill: Sit to Stand   Level of [place]: Sit/Stand stand-by assist  (Elevated bed height)   Transfer Skill: Toilet Transfer   Level of [place]: Toilet stand-by assist   Balance   Balance Comments CGA when ambulating. Patient with somewhat scissoring gait; cues to correct. Fatigues quickly. PT following for balance/gait.    Lower " Body Dressing   Level of [place]: Dress Lower Body stand-by assist   Toileting   Level of [place]: Toilet stand-by assist   Instrumental Activities of Daily Living (IADL)   Previous Responsibilities housekeeping;meal prep;shopping;medication management;finances;driving;work   IADL Comments Patient's  able to assist with heavy IADL tasks as needed.   Activities of Daily Living Analysis   Impairments Contributing to Impaired Activities of Daily Living balance impaired;cognition impaired;pain;sensation decreased;strength decreased   General Therapy Interventions   Planned Therapy Interventions ADL retraining;IADL retraining;strengthening;transfer training;home program guidelines;progressive activity/exercise;risk factor education;other (see comments)  (EC/FM education, Education on compensatory memory strategies)   Clinical Impression   Criteria for Skilled Therapeutic Interventions Met yes, treatment indicated   OT Diagnosis Decreased ADL/IADL independence and safety.   Influenced by the following impairments Spinal Precautions, Pain, Decreased Sensation, Fatigue, Weakness, Reported STM deficits    Assessment of Occupational Performance 3-5 Performance Deficits   Identified Performance Deficits Dressing, Bathing, Toileting, Home Management, Leisure   Clinical Decision Making (Complexity) Low complexity   Therapy Frequency 6x/week   Predicted Duration of Therapy Intervention (days/wks) 2 weeks   Anticipated Equipment Needs at Discharge other (see comments)  (TBD)   Anticipated Discharge Disposition Home with Home Therapy;Home with Assist;Home with Outpatient Therapy  (Pending progress while inpatient)   Risks and Benefits of Treatment have been explained. Yes   Patient, Family & other staff in agreement with plan of care Yes   Clinical Impression Comments Patient appropriate for skilled OT services to address deficits outlined above and instruct in safe techniques for ADL completion given new spinal  "precautions.   [facility] AM-PAC  \"6 Clicks\" Daily Activity Inpatient Short Form   1. Putting on and taking off regular lower body clothing? 3 - A Little   2. Bathing (including washing, rinsing, drying)? 2 - A Lot   3. Toileting, which includes using toilet, bedpan or urinal? 3 - A Little   4. Putting on and taking off regular upper body clothing? 3 - A Little   5. Taking care of personal grooming such as brushing teeth? 4 - None   6. Eating meals? 4 - None   Daily Activity Raw Score (Score out of 24.Lower scores equate to lower levels of function) 19   Total Evaluation Time   Total Evaluation Time (Minutes) 8     "

## 2019-09-02 NOTE — PROGRESS NOTES
"MRI C spine reviewed. No epidural metastatic tumor cord compression. Recommend that radiation oncology evaluate patient for treatment of metastatic lesions. Neurosurgery to sign off. Follow-up in 2 weeks for L1 pathologic fracture. Brace at all times when out of bed or head of bed > 30 degrees.     Discussed with Dr. Weldon.     Bill \"Drew\" MD [facility]   Neurosurgery, PGY-3    Please contact neurosurgery resident on call with questions.    Dial * * [phone], enter [phone] when prompted.     "

## 2019-09-02 NOTE — PLAN OF CARE
1900-0730: AVSS on RA. Back pain managed with 2 mg IV morphine x1, 4 mg IV morphine x2, 10 mg PO oxy x1. Encouraged use of PO pain medications more consistently, but pt prefers IV at this time and states it is more effective. Overall pain is improving, but is aggravated most with movement. Ketamine/gabapentin/lidocaine cream and voltaren gel to back and spine alternated. Zofran given x1 for nausea. Ambien given for sleep. Port patent. Up with SBA + brace + GB + walker to bathroom. Voiding adequately. Plan today for C spine MRI with premeds (ativan, morphine). Per MD note, hope for XRT simulation next 1-2 days. Continue with POC.

## 2019-09-02 NOTE — PLAN OF CARE
[id number]: DBP in the 90s. OVSS. Continues with back pain. PRN IV Morphine 4 mg given x4 and PO Oxycodone 10 mg given once. Patient to start MS Contin this evening. MRI of cervical spine done. Patient was premedicated with one of the IV Morphine doses and PRN IV Ativan 0.5 mg. MRI showed numerous enhancing lesions in cervical spine (concerning for metastatic disease) and mild multilevel cervical spondylosis. Lidocaine patch was already removed when patient was down in MRI. UOP adequate. LBM 8/29; however, patient passing gas. Miralax added daily. No appetite. Nutrition Consult placed and ordered Boost Plus between meals. OT evaluation done. Rad Onc simulation hopefully to be done tomorrow. Rad Onc would prefer BMBx to be done prior to XRT; team will discuss tomorrow. Gorge brace to be worn when OOB and when HOB is greater than 30 degrees. Brace doesn't have to be worn when HOB is less than 30 degrees. Ambulated in the halls with daughter. Continue POC.

## 2019-09-02 NOTE — PROGRESS NOTES
"Neurosurgery Progress Note    S: tolerating bracing     O:  BP (!) 142/96 (BP Location: Left arm)   Pulse 69   Temp 96.5  F (35.8  C) (Oral)   Resp 18   Ht 1.575 m (5' 2\")   Wt 74.5 kg (164 lb 3.2 oz)   SpO2 95%   BMI 30.03 kg/m    Exam:  General: Awake;  Alert, In No Acute Distress  Pulm: Breathing Comfortably   Mental status: Oriented x 3  Cranial Nerves: face symmetric. No dysarthria.   Strength: 5/5 in LUE and LLE. 4/5 in RUE and RLE  Pronator Drift: Absent  Sensory: Intact to Light Touch    Assessment:   Diffuse breast cancer metastases with most notable lesion being L1 pathologic fracture. Stable with bracing.     Plan:   No neurosurgical intervention indicated at this time   Follow-up in 2 weeks.   Please obtain MRI C spine to evaluate for cervical lesion  Ok for regular diet at this time  Neurosurgery to follow peripherally    Please notify when MRI is completed    Khan \"Drew\" MD [facility]   Neurosurgery, PGY-3    Please contact neurosurgery resident on call with questions.    Dial * * [phone], enter [phone] when prompted.     "

## 2019-09-03 ENCOUNTER — ALLIED HEALTH/NURSE VISIT (OUTPATIENT)
Dept: RADIATION ONCOLOGY | Facility: CLINIC | Age: 59
End: 2019-09-03
Attending: RADIOLOGY
Payer: COMMERCIAL

## 2019-09-03 ENCOUNTER — APPOINTMENT (OUTPATIENT)
Dept: PHYSICAL THERAPY | Facility: CLINIC | Age: 59
DRG: 478 | End: 2019-09-03
Payer: COMMERCIAL

## 2019-09-03 DIAGNOSIS — C79.9 METASTATIC DISEASE (H): Primary | ICD-10-CM

## 2019-09-03 DIAGNOSIS — C50.911 BILATERAL MALIGNANT NEOPLASM OF BREAST IN FEMALE, UNSPECIFIED ESTROGEN RECEPTOR STATUS, UNSPECIFIED SITE OF BREAST (H): ICD-10-CM

## 2019-09-03 DIAGNOSIS — C50.912 BILATERAL MALIGNANT NEOPLASM OF BREAST IN FEMALE, UNSPECIFIED ESTROGEN RECEPTOR STATUS, UNSPECIFIED SITE OF BREAST (H): ICD-10-CM

## 2019-09-03 LAB — RADIOLOGIST FLAGS: ABNORMAL

## 2019-09-03 PROCEDURE — 25000132 ZZH RX MED GY IP 250 OP 250 PS 637: Performed by: STUDENT IN AN ORGANIZED HEALTH CARE EDUCATION/TRAINING PROGRAM

## 2019-09-03 PROCEDURE — 25000128 H RX IP 250 OP 636: Performed by: PHYSICIAN ASSISTANT

## 2019-09-03 PROCEDURE — 25000128 H RX IP 250 OP 636: Performed by: INTERNAL MEDICINE

## 2019-09-03 PROCEDURE — 25000131 ZZH RX MED GY IP 250 OP 636 PS 637: Performed by: INTERNAL MEDICINE

## 2019-09-03 PROCEDURE — 25000128 H RX IP 250 OP 636: Performed by: EMERGENCY MEDICINE

## 2019-09-03 PROCEDURE — 99232 SBSQ HOSP IP/OBS MODERATE 35: CPT | Performed by: INTERNAL MEDICINE

## 2019-09-03 PROCEDURE — 25000132 ZZH RX MED GY IP 250 OP 250 PS 637: Performed by: PHYSICIAN ASSISTANT

## 2019-09-03 PROCEDURE — 25000132 ZZH RX MED GY IP 250 OP 250 PS 637: Performed by: INTERNAL MEDICINE

## 2019-09-03 PROCEDURE — 12000001 ZZH R&B MED SURG/OB UMMC

## 2019-09-03 PROCEDURE — 97110 THERAPEUTIC EXERCISES: CPT | Mod: GP

## 2019-09-03 PROCEDURE — 77290 THER RAD SIMULAJ FIELD CPLX: CPT | Performed by: RADIOLOGY

## 2019-09-03 RX ORDER — OXYCODONE HYDROCHLORIDE 10 MG/1
10-20 TABLET ORAL
Status: DISCONTINUED | OUTPATIENT
Start: 2019-09-03 | End: 2019-09-06 | Stop reason: HOSPADM

## 2019-09-03 RX ORDER — POLYETHYLENE GLYCOL 3350 17 G/17G
17 POWDER, FOR SOLUTION ORAL 2 TIMES DAILY
Status: DISCONTINUED | OUTPATIENT
Start: 2019-09-03 | End: 2019-09-03

## 2019-09-03 RX ORDER — ACETAMINOPHEN 500 MG
1000 TABLET ORAL 3 TIMES DAILY
Status: DISCONTINUED | OUTPATIENT
Start: 2019-09-03 | End: 2019-09-06 | Stop reason: HOSPADM

## 2019-09-03 RX ORDER — POLYETHYLENE GLYCOL 3350 17 G/17G
17 POWDER, FOR SOLUTION ORAL 2 TIMES DAILY
Status: DISCONTINUED | OUTPATIENT
Start: 2019-09-03 | End: 2019-09-06 | Stop reason: HOSPADM

## 2019-09-03 RX ORDER — BISACODYL 10 MG
10 SUPPOSITORY, RECTAL RECTAL DAILY PRN
Status: DISCONTINUED | OUTPATIENT
Start: 2019-09-03 | End: 2019-09-06 | Stop reason: HOSPADM

## 2019-09-03 RX ADMIN — RANITIDINE 150 MG: 150 TABLET ORAL at 20:10

## 2019-09-03 RX ADMIN — LIDOCAINE 1 PATCH: 560 PATCH PERCUTANEOUS; TOPICAL; TRANSDERMAL at 08:18

## 2019-09-03 RX ADMIN — Medication: at 11:41

## 2019-09-03 RX ADMIN — MORPHINE SULFATE 15 MG: 15 TABLET, EXTENDED RELEASE ORAL at 20:11

## 2019-09-03 RX ADMIN — PROPRANOLOL HYDROCHLORIDE 60 MG: 60 CAPSULE, EXTENDED RELEASE ORAL at 08:18

## 2019-09-03 RX ADMIN — DICLOFENAC 4 G: 10 GEL TOPICAL at 22:23

## 2019-09-03 RX ADMIN — Medication 5 ML: at 10:38

## 2019-09-03 RX ADMIN — DICLOFENAC 4 G: 10 GEL TOPICAL at 09:56

## 2019-09-03 RX ADMIN — BUSPIRONE HYDROCHLORIDE 15 MG: 15 TABLET ORAL at 08:18

## 2019-09-03 RX ADMIN — Medication: at 20:15

## 2019-09-03 RX ADMIN — TOLTERODINE 4 MG: 4 CAPSULE, EXTENDED RELEASE ORAL at 08:18

## 2019-09-03 RX ADMIN — VENLAFAXINE HYDROCHLORIDE 225 MG: 75 CAPSULE, EXTENDED RELEASE ORAL at 08:18

## 2019-09-03 RX ADMIN — ACETAMINOPHEN 1000 MG: 500 TABLET, FILM COATED ORAL at 20:11

## 2019-09-03 RX ADMIN — BUSPIRONE HYDROCHLORIDE 15 MG: 15 TABLET ORAL at 20:11

## 2019-09-03 RX ADMIN — OXYCODONE HYDROCHLORIDE 10 MG: 5 TABLET ORAL at 09:51

## 2019-09-03 RX ADMIN — OXYCODONE HYDROCHLORIDE 10 MG: 5 TABLET ORAL at 02:29

## 2019-09-03 RX ADMIN — POLYETHYLENE GLYCOL 3350 17 G: 17 POWDER, FOR SOLUTION ORAL at 08:18

## 2019-09-03 RX ADMIN — DEXAMETHASONE 4 MG: 4 TABLET ORAL at 20:11

## 2019-09-03 RX ADMIN — SENNOSIDES AND DOCUSATE SODIUM 2 TABLET: 8.6; 5 TABLET ORAL at 08:18

## 2019-09-03 RX ADMIN — Medication 5 ML: at 17:21

## 2019-09-03 RX ADMIN — ALTEPLASE 2 MG: 2.2 INJECTION, POWDER, LYOPHILIZED, FOR SOLUTION INTRAVENOUS at 15:01

## 2019-09-03 RX ADMIN — ACETAMINOPHEN 650 MG: 325 TABLET, FILM COATED ORAL at 02:29

## 2019-09-03 RX ADMIN — DEXAMETHASONE 4 MG: 4 TABLET ORAL at 08:18

## 2019-09-03 RX ADMIN — SENNOSIDES AND DOCUSATE SODIUM 2 TABLET: 8.6; 5 TABLET ORAL at 20:11

## 2019-09-03 RX ADMIN — ZOLPIDEM TARTRATE 5 MG: 5 TABLET, FILM COATED ORAL at 22:23

## 2019-09-03 RX ADMIN — OXYCODONE HYDROCHLORIDE 20 MG: 10 TABLET ORAL at 22:22

## 2019-09-03 RX ADMIN — Medication 5 ML: at 22:32

## 2019-09-03 RX ADMIN — DICLOFENAC 4 G: 10 GEL TOPICAL at 14:49

## 2019-09-03 RX ADMIN — ALTEPLASE 2 MG: 2.2 INJECTION, POWDER, LYOPHILIZED, FOR SOLUTION INTRAVENOUS at 12:35

## 2019-09-03 RX ADMIN — POLYETHYLENE GLYCOL 3350 17 G: 17 POWDER, FOR SOLUTION ORAL at 14:49

## 2019-09-03 RX ADMIN — MORPHINE SULFATE 15 MG: 15 TABLET, EXTENDED RELEASE ORAL at 08:18

## 2019-09-03 RX ADMIN — Medication: at 08:19

## 2019-09-03 RX ADMIN — HYDRALAZINE HYDROCHLORIDE 10 MG: 20 INJECTION INTRAMUSCULAR; INTRAVENOUS at 10:14

## 2019-09-03 RX ADMIN — RANITIDINE 150 MG: 150 TABLET ORAL at 08:18

## 2019-09-03 RX ADMIN — PROCHLORPERAZINE EDISYLATE 10 MG: 5 INJECTION INTRAMUSCULAR; INTRAVENOUS at 10:35

## 2019-09-03 RX ADMIN — Medication: at 16:20

## 2019-09-03 RX ADMIN — OXYCODONE HYDROCHLORIDE 20 MG: 10 TABLET ORAL at 16:20

## 2019-09-03 RX ADMIN — MORPHINE SULFATE 4 MG: 2 INJECTION, SOLUTION INTRAMUSCULAR; INTRAVENOUS at 12:35

## 2019-09-03 ASSESSMENT — ACTIVITIES OF DAILY LIVING (ADL)
ADLS_ACUITY_SCORE: 17

## 2019-09-03 ASSESSMENT — PAIN DESCRIPTION - DESCRIPTORS
DESCRIPTORS: ACHING;CONSTANT;DULL
DESCRIPTORS: ACHING;CONSTANT
DESCRIPTORS: ACHING;CONSTANT;SHARP
DESCRIPTORS: ACHING;DULL;CONSTANT

## 2019-09-03 ASSESSMENT — MIFFLIN-ST. JEOR: SCORE: 1278.25

## 2019-09-03 NOTE — PROGRESS NOTES
[facility], [place]    Hematology / Oncology Progress Note    Date of Service (when I saw the patient): 09/03/2019     Assessment & Plan   Shaneka Alvarez is a 59 year old female with history of multiple sclerosis which is stable, T2 N1 M0 invasive lobular carcinoma of the right breast that was ER+/WI+ HER-2 negative s/p bilateral mastectomies, admitted after recent findings of metastatic lesions to to skull and to vertebrae along with progressive back pain.     CHANGES TODAY:  - Rad onc to simulate  - IR CT guided bx tomorrow (9/4)  - Rad onc may not start XRT until prelim path is back  - Increase bowel regimen      #Progressive severe back pain   #L1 compression fracture  #Metastic lesion in skull, thoracic and lumbar spine  MRI brain 8/21/19 with multiple enhancing calvarial lesions (a similar single lesion in C2 vertebral body), new since 7/10/2018 and consistent metastatic disease. T/L spine MRI 8/31/19 with numerous enhancing lesions of the T and L spine, largest around T9 and L1. No evidence of cord compression. Has a L1 compression fracture and impending L4. MRI C spine 9/2/19 also with numerous enhancing lesions.  - Rad Onc consulted--> Planning 10 fractions of XRT to L spine +/- C spine. Pt agreeable to completing XRT at [facility] and staying locally. Per discussion on 9/3, may not start XRT until prelim dx is back from bone bx. Simulation scan 9/3.  - NSG consulted--> no surgical intervention, wear [place] brace when out of bed and HOB >30 degrees  - Continue Dex 4 mg BID  - MS Contin 15 mg BID (x9/2). Continue IV Morphine, PO Oxycodone.   - Miralax BID, Senna BID, Bisacodyl suppository prn     #Recurrent breast cancer vs. New primary  History of ER+WI+NVA1sjt T2 N1 M0 breast cancer s/p chemo and radiation in [id number], s/p R mod radical mastectomy and L prophylactic mastectomy in 2016.  Finished 7 years of exemastone until 2014.   - IR consulted --> will perform CT guided bone  biopsy on 9/4, much appreciated.  - Has follow-up appointment with Dr. Zepeda on 9/18/19  - PET scan 8/30/19 with numerous bone lesions (calvarium, spine, sacrum, pelvis, ribs most prominent at C7, T3, L1 and L4), hypermetabolic 3 cm lesion in LLL, and mediastinal/hilar LN. Concern for metastatic breast vs lung cancer.     #Subacute productive cough without fevers, chills, night sweats  #Left lung nodule with GGO  Not coughing at present time but will order sputum cultures in case she brings up anything. In absence of any additional symptoms, signs, or labs concerning for infection will not do a full infection workup as of yet. Sputum culture & gram stain ordered. Procal is negative.    #HTN  Possibly exacerbated by pain. Is on Propranolol PTA.   - Discussed and will hold additional antihypertensives   - PRN Hydralazine      --- CHRONIC ISSUES ---  #MS with right UE and LE weakness  #Migraine  In particular weak in right lower leg with weakness of hip flexion, ankle dorsiflexion, and toe dorsiflexion without actual evidence of foot drop. Recent Brain imaging without evidence of new MS lesions.  - Continue Propranolol, magnesium held for now  - Not on any controller med for MS.   #Overactive bladder: Continue tolterodine  #Depression: Continue venlafaxine, buspar     FEN: regular diet, NPO after midnight.  Prophylaxis: Lovenox 40 mg every day (hold for procedure), Ranitidine 150 mg BID  Code: FULL  Disposition: 1-2 days based on pain control, radiation and biopsy coordination.     The patient's care was discussed with the Attending Physician, Dr. Mejia.    Jeannie [facility] Hospitaljens Bucio PA-C  Hematology/Oncology  Pager: [phone]     Interval History   Still in pain this morning. IV Morphine works best. Improves her pain but does not make it go away completely. Had increasing nausea and vomiting yesterday. No BM, but passing gas. No SOB, chest pain, abdominal pain.    Physical Exam   Temp: 96.4  F (35.8  C) Temp src:  Oral BP: (!) 155/94   Heart Rate: 63 Resp: 18 SpO2: 98 % O2 Device: None (Room air)    Vitals:    08/30/19 1842 09/02/19 0800 09/03/19 0752   Weight: 74.6 kg (164 lb 6.4 oz) 74.5 kg (164 lb 3.2 oz) 74 kg (163 lb 2.3 oz)     Vital Signs with Ranges  Temp:  [95.7  F (35.4  C)-97.7  F (36.5  C)] 96.4  F (35.8  C)  Heart Rate:  [58-83] 63  Resp:  [18] 18  BP: (137-186)/() 155/94  SpO2:  [93 %-100 %] 98 %  I/O last 3 completed shifts:  In: 745 [P.O.:720; I.V.:25]  Out: 2575 [Urine:2575]    Constitutional: Pleasant female seen laying in bed. No apparent distress, and appears stated age.  Eyes: Lids and lashes normal, sclera clear, conjunctiva normal.  ENT: Normocephalic, oral cavity without erythema or exudates, gums normal and good dentition.   Respiratory: No increased work of breathing, good air exchange, clear to auscultation right, distant breath sounds left base, no crackles or wheezing.  Cardiovascular: Regular rate and rhythm, normal S1 and S2, and no murmur noted.  GI: No masses or scars. +BS. Soft. No tenderness on palpation.  Skin: Limited bruising, no bleeding, no redness, no warmth, no rashes, no lesions, no jaundice.  Extremities: There is no redness, warmth, or swelling of the joints. No lower extremity edema. No cyanosis.  Neurologic: Awake, alert, oriented to name, place and time.    Vascular access: PIV    Medications       alteplase  2 mg Intravenous Q2H     busPIRone  15 mg Oral BID     dexamethasone  4 mg Oral Q12H TIP     diclofenac  4 g Transdermal 4x Daily     enoxaparin  40 mg Subcutaneous Q24H     gadobutrol  0.1 mL/kg Intravenous Once     heparin  5 mL Intracatheter Q28 Days     heparin lock flush  5-10 mL Intracatheter Q24H     ketamine 5% gabapentin 8% lidocaine 2.5%   Topical 4x Daily     lidocaine  1 patch Transdermal Q24H     lidocaine   Transdermal Q8H     lidocaine   Transdermal Q24h     morphine  15 mg Oral Q12H TIP     polyethylene glycol  17 g Oral BID     propranolol ER  60 mg  Oral Daily     ranitidine  150 mg Oral BID     senna-docusate  1 tablet Oral BID    Or     senna-docusate  2 tablet Oral BID     sodium chloride (PF)  3 mL Intracatheter Q8H     tolterodine ER  4 mg Oral Daily     venlafaxine  225 mg Oral Daily       Data   ROUTINE IP LABS (Last four results)  BMP  Recent Labs   Lab 09/01/19 0605 08/30/19 2015    134   POTASSIUM 4.0 3.6   CHLORIDE 106 100   RAFAELA 8.3* 8.8   CO2 22 26   BUN 14 9   CR 0.72 0.79   GLC 91 92     CBC  Recent Labs   Lab 09/01/19 0605 08/30/19 2015   WBC 6.0 4.6   RBC 4.79 4.94   HGB 13.4 14.0   HCT 43.4 43.0   MCV 91 87   MCH 28.0 28.3   MCHC 30.9* 32.6   RDW 12.5 12.7    202     INR  Recent Labs   Lab 08/30/19 2015   INR 1.09

## 2019-09-03 NOTE — PLAN OF CARE
Discharge Planner PT  PT 7D  Patient plan for discharge: Home with assist  Current status: Back pain continues and movement ability is improving. Pt is able to roll to side independently with use of bed rail. BP at beginning of session 173/101, HR 60, SpO2 98%. RN notified. Pt instructed in supine LE exercise for isometrics and ankle AROM. Pt became nauseated and vomited. After vomiting, /110, HR 58, SpO2 98% and RN entered to provide medication. PT session was terminated due to her elevated BP.   Barriers to return to prior living situation: Medical condition, decreased ambulatory ability  Recommendations for discharge: Home with assist and HH PT/OT.  Rationale for recommendations: Continued rehab needed to progress with functional mobility, ambulation, safe transition to home.       Entered by: Romario Gamble 09/03/2019 10:40 AM

## 2019-09-03 NOTE — CONSULTS
Patient is on IR schedule 9/4/2019 for a CT guided right iliac crest lytic bone lesion biopsy.   Labs WNL for procedure.   Orders for NPO have been entered.   Medications to be held include: lovenox x1 dose  Consent will be done prior to procedure.     Please contact the IR charge RN at [phone] for estimated time of procedure.     Case discussed with Dr. Potter from IR and Jeannie Yañez PA-C. This is a 59 year old female with history of multiple sclerosis which is stable, T2 N1 M0 invasive lobular carcinoma of the right breast that was ER+/NV+ HER-2 negative s/p bilateral mastectomies, admitted after recent findings of metastatic lesions to to skull and to vertebrae along with progressive back pain. Team requesting bone biopsy prior to initiation of radiation to confirm diagnosis of metastases.     Milady Cadena DNP, APRN  Interventional Radiology  Pager: [phone]

## 2019-09-03 NOTE — PLAN OF CARE
3639-3922: Continues with lower back pain. Lidocaine patch in place on lower back. PRN PO Oxycodone 10 mg and 20 mg given. PRN IV Morphine 4 mg given before patient went down to have XRT simulation. While working with PT, BP was elevated (173/101 and 186/110); therefore meeting parameters for PRN IV Hydralazine 10 mg. Patient also had a 50 mL emesis. PRN IV Compazine 10 mg given with relief. UOP adequate. LBM 8/29. Patient in agreement to get a PRN Dulcolax suppository at 2000 if doesn't have a BM. Appetite fair. Patient is drinking Boost Plus between meals. Port-a-cath not getting a blood return. Dressing changed and alteplase administered twice without success. Needle changed and now getting good blood return. Plan for patient to be NPO at midnight for CT guided lytic lesion biopsy of right iliac crest in IR. Lovenox was held this AM. Continue with POC.

## 2019-09-03 NOTE — PLAN OF CARE
8427-0278: Hypertensive, DBPs 90s; did not meet hydralazine parameters. Other VSS on RA. Back pain managed with 4 mg IV morphine x1, 10 mg PO oxy x2, tylenol x1. Discussed again using PO pain medications more frequently as needed to work toward pain control for discharge and using IV for more severe pain as needed, pt amenable. Ambien given for sleep. Denies nausea. Port patent. Voiding adequately. LBM 08/29, continue bowel regimen. Up with SBA + walker + back brace. Awaiting radiation plan until bone biopsy performed, team will discuss doing this inpatient today. Continue with POC.

## 2019-09-03 NOTE — PLAN OF CARE
OT 7D: cancel, pt working with PT this morning then developing significantly elevated BP and nausea after, unable to check back in later in day with schedule demands, will reschedule.

## 2019-09-04 ENCOUNTER — APPOINTMENT (OUTPATIENT)
Dept: OCCUPATIONAL THERAPY | Facility: CLINIC | Age: 59
DRG: 478 | End: 2019-09-04
Payer: COMMERCIAL

## 2019-09-04 ENCOUNTER — APPOINTMENT (OUTPATIENT)
Dept: INTERVENTIONAL RADIOLOGY/VASCULAR | Facility: CLINIC | Age: 59
DRG: 478 | End: 2019-09-04
Attending: NURSE PRACTITIONER
Payer: COMMERCIAL

## 2019-09-04 ENCOUNTER — APPOINTMENT (OUTPATIENT)
Dept: GENERAL RADIOLOGY | Facility: CLINIC | Age: 59
DRG: 478 | End: 2019-09-04
Attending: INTERNAL MEDICINE
Payer: COMMERCIAL

## 2019-09-04 LAB
ANION GAP SERPL CALCULATED.3IONS-SCNC: 8 MMOL/L (ref 3–14)
BASOPHILS # BLD AUTO: 0 10E9/L (ref 0–0.2)
BASOPHILS NFR BLD AUTO: 0.2 %
BUN SERPL-MCNC: 11 MG/DL (ref 7–30)
CALCIUM SERPL-MCNC: 9 MG/DL (ref 8.5–10.1)
CANCER AG27-29 SERPL-ACNC: 415 U/ML (ref 0–39)
CEA SERPL-MCNC: 1.9 UG/L (ref 0–2.5)
CHLORIDE SERPL-SCNC: 100 MMOL/L (ref 94–109)
CO2 SERPL-SCNC: 28 MMOL/L (ref 20–32)
CREAT SERPL-MCNC: 0.82 MG/DL (ref 0.52–1.04)
DIFFERENTIAL METHOD BLD: ABNORMAL
EOSINOPHIL # BLD AUTO: 0 10E9/L (ref 0–0.7)
EOSINOPHIL NFR BLD AUTO: 0 %
ERYTHROCYTE [DISTWIDTH] IN BLOOD BY AUTOMATED COUNT: 13.1 % (ref 10–15)
GFR SERPL CREATININE-BSD FRML MDRD: 80 ML/MIN/{1.73_M2}
GLUCOSE SERPL-MCNC: 89 MG/DL (ref 70–99)
HCT VFR BLD AUTO: 47.8 % (ref 35–47)
HGB BLD-MCNC: 15.7 G/DL (ref 11.7–15.7)
IMM GRANULOCYTES # BLD: 0.1 10E9/L (ref 0–0.4)
IMM GRANULOCYTES NFR BLD: 1.1 %
INR PPP: 1.04 (ref 0.86–1.14)
LYMPHOCYTES # BLD AUTO: 1 10E9/L (ref 0.8–5.3)
LYMPHOCYTES NFR BLD AUTO: 11.1 %
MCH RBC QN AUTO: 28.8 PG (ref 26.5–33)
MCHC RBC AUTO-ENTMCNC: 32.8 G/DL (ref 31.5–36.5)
MCV RBC AUTO: 88 FL (ref 78–100)
MONOCYTES # BLD AUTO: 1.1 10E9/L (ref 0–1.3)
MONOCYTES NFR BLD AUTO: 11.6 %
NEUTROPHILS # BLD AUTO: 6.9 10E9/L (ref 1.6–8.3)
NEUTROPHILS NFR BLD AUTO: 76 %
NRBC # BLD AUTO: 0 10*3/UL
NRBC BLD AUTO-RTO: 0 /100
PLATELET # BLD AUTO: 251 10E9/L (ref 150–450)
POTASSIUM SERPL-SCNC: 3.6 MMOL/L (ref 3.4–5.3)
RBC # BLD AUTO: 5.46 10E12/L (ref 3.8–5.2)
SODIUM SERPL-SCNC: 136 MMOL/L (ref 133–144)
WBC # BLD AUTO: 9.1 10E9/L (ref 4–11)

## 2019-09-04 PROCEDURE — 0QB23ZX EXCISION OF RIGHT PELVIC BONE, PERCUTANEOUS APPROACH, DIAGNOSTIC: ICD-10-PCS | Performed by: RADIOLOGY

## 2019-09-04 PROCEDURE — 97535 SELF CARE MNGMENT TRAINING: CPT | Mod: GO | Performed by: OCCUPATIONAL THERAPIST

## 2019-09-04 PROCEDURE — 25000125 ZZHC RX 250: Performed by: RADIOLOGY

## 2019-09-04 PROCEDURE — 00000159 ZZHCL STATISTIC H-SEND OUTS PREP: Performed by: RADIOLOGY

## 2019-09-04 PROCEDURE — 88342 IMHCHEM/IMCYTCHM 1ST ANTB: CPT | Performed by: RADIOLOGY

## 2019-09-04 PROCEDURE — 25000128 H RX IP 250 OP 636: Performed by: RADIOLOGY

## 2019-09-04 PROCEDURE — 25000132 ZZH RX MED GY IP 250 OP 250 PS 637: Performed by: INTERNAL MEDICINE

## 2019-09-04 PROCEDURE — 74018 RADEX ABDOMEN 1 VIEW: CPT

## 2019-09-04 PROCEDURE — 88360 TUMOR IMMUNOHISTOCHEM/MANUAL: CPT | Performed by: INTERNAL MEDICINE

## 2019-09-04 PROCEDURE — 12000001 ZZH R&B MED SURG/OB UMMC

## 2019-09-04 PROCEDURE — 25000128 H RX IP 250 OP 636: Performed by: EMERGENCY MEDICINE

## 2019-09-04 PROCEDURE — 25000128 H RX IP 250 OP 636: Performed by: INTERNAL MEDICINE

## 2019-09-04 PROCEDURE — 27211211 ZZH NEEDLE CR9

## 2019-09-04 PROCEDURE — 88360 TUMOR IMMUNOHISTOCHEM/MANUAL: CPT | Performed by: RADIOLOGY

## 2019-09-04 PROCEDURE — 25000131 ZZH RX MED GY IP 250 OP 636 PS 637: Performed by: INTERNAL MEDICINE

## 2019-09-04 PROCEDURE — 99232 SBSQ HOSP IP/OBS MODERATE 35: CPT | Performed by: INTERNAL MEDICINE

## 2019-09-04 PROCEDURE — 84999 UNLISTED CHEMISTRY PROCEDURE: CPT | Performed by: INTERNAL MEDICINE

## 2019-09-04 PROCEDURE — 36592 COLLECT BLOOD FROM PICC: CPT | Performed by: PHYSICIAN ASSISTANT

## 2019-09-04 PROCEDURE — 86300 IMMUNOASSAY TUMOR CA 15-3: CPT | Performed by: PHYSICIAN ASSISTANT

## 2019-09-04 PROCEDURE — 80048 BASIC METABOLIC PNL TOTAL CA: CPT | Performed by: PHYSICIAN ASSISTANT

## 2019-09-04 PROCEDURE — 85025 COMPLETE CBC W/AUTO DIFF WBC: CPT | Performed by: PHYSICIAN ASSISTANT

## 2019-09-04 PROCEDURE — 77012 CT SCAN FOR NEEDLE BIOPSY: CPT

## 2019-09-04 PROCEDURE — 82378 CARCINOEMBRYONIC ANTIGEN: CPT | Performed by: PHYSICIAN ASSISTANT

## 2019-09-04 PROCEDURE — 27210732 ZZH ACCESSORY CR1

## 2019-09-04 PROCEDURE — 88305 TISSUE EXAM BY PATHOLOGIST: CPT | Performed by: RADIOLOGY

## 2019-09-04 PROCEDURE — 00000158 ZZHCL STATISTIC H-FISH PROCESS B/S: Performed by: RADIOLOGY

## 2019-09-04 PROCEDURE — 99152 MOD SED SAME PHYS/QHP 5/>YRS: CPT

## 2019-09-04 PROCEDURE — 88341 IMHCHEM/IMCYTCHM EA ADD ANTB: CPT | Performed by: RADIOLOGY

## 2019-09-04 PROCEDURE — 25000132 ZZH RX MED GY IP 250 OP 250 PS 637: Performed by: STUDENT IN AN ORGANIZED HEALTH CARE EDUCATION/TRAINING PROGRAM

## 2019-09-04 PROCEDURE — 85610 PROTHROMBIN TIME: CPT | Performed by: PHYSICIAN ASSISTANT

## 2019-09-04 PROCEDURE — 88333 PATH CONSLTJ SURG CYTO XM 1: CPT | Performed by: RADIOLOGY

## 2019-09-04 PROCEDURE — 25000132 ZZH RX MED GY IP 250 OP 250 PS 637: Performed by: PHYSICIAN ASSISTANT

## 2019-09-04 PROCEDURE — 27210909 ZZH NEEDLE CR5

## 2019-09-04 PROCEDURE — 88377 M/PHMTRC ALYS ISHQUANT/SEMIQ: CPT | Performed by: PATHOLOGY

## 2019-09-04 RX ORDER — NICOTINE POLACRILEX 4 MG
15-30 LOZENGE BUCCAL
Status: ACTIVE | OUTPATIENT
Start: 2019-09-04 | End: 2019-09-05

## 2019-09-04 RX ORDER — FLUMAZENIL 0.1 MG/ML
0.2 INJECTION, SOLUTION INTRAVENOUS
Status: DISCONTINUED | OUTPATIENT
Start: 2019-09-04 | End: 2019-09-04 | Stop reason: HOSPADM

## 2019-09-04 RX ORDER — DEXTROSE MONOHYDRATE 25 G/50ML
25-50 INJECTION, SOLUTION INTRAVENOUS
Status: ACTIVE | OUTPATIENT
Start: 2019-09-04 | End: 2019-09-05

## 2019-09-04 RX ORDER — NALOXONE HYDROCHLORIDE 0.4 MG/ML
.1-.4 INJECTION, SOLUTION INTRAMUSCULAR; INTRAVENOUS; SUBCUTANEOUS
Status: DISCONTINUED | OUTPATIENT
Start: 2019-09-04 | End: 2019-09-04 | Stop reason: HOSPADM

## 2019-09-04 RX ORDER — FENTANYL CITRATE 50 UG/ML
25-50 INJECTION, SOLUTION INTRAMUSCULAR; INTRAVENOUS EVERY 5 MIN PRN
Status: DISCONTINUED | OUTPATIENT
Start: 2019-09-04 | End: 2019-09-04 | Stop reason: HOSPADM

## 2019-09-04 RX ORDER — DEXAMETHASONE SODIUM PHOSPHATE 4 MG/ML
4 INJECTION, SOLUTION INTRA-ARTICULAR; INTRALESIONAL; INTRAMUSCULAR; INTRAVENOUS; SOFT TISSUE EVERY 12 HOURS
Status: DISCONTINUED | OUTPATIENT
Start: 2019-09-04 | End: 2019-09-06

## 2019-09-04 RX ORDER — ONDANSETRON 2 MG/ML
4 INJECTION INTRAMUSCULAR; INTRAVENOUS
Status: COMPLETED | OUTPATIENT
Start: 2019-09-04 | End: 2019-09-04

## 2019-09-04 RX ADMIN — MIDAZOLAM 1 MG: 1 INJECTION INTRAMUSCULAR; INTRAVENOUS at 13:00

## 2019-09-04 RX ADMIN — FENTANYL CITRATE 50 MCG: 50 INJECTION INTRAMUSCULAR; INTRAVENOUS at 13:09

## 2019-09-04 RX ADMIN — LORAZEPAM 0.5 MG: 0.5 TABLET ORAL at 10:27

## 2019-09-04 RX ADMIN — Medication 5 ML: at 09:11

## 2019-09-04 RX ADMIN — TOLTERODINE 4 MG: 4 CAPSULE, EXTENDED RELEASE ORAL at 08:23

## 2019-09-04 RX ADMIN — POLYETHYLENE GLYCOL 3350 17 G: 17 POWDER, FOR SOLUTION ORAL at 08:23

## 2019-09-04 RX ADMIN — RANITIDINE 150 MG: 150 TABLET ORAL at 08:23

## 2019-09-04 RX ADMIN — BISACODYL 10 MG: 5 TABLET, COATED ORAL at 18:44

## 2019-09-04 RX ADMIN — ONDANSETRON 8 MG: 2 INJECTION INTRAMUSCULAR; INTRAVENOUS at 20:08

## 2019-09-04 RX ADMIN — MORPHINE SULFATE 4 MG: 2 INJECTION, SOLUTION INTRAMUSCULAR; INTRAVENOUS at 18:35

## 2019-09-04 RX ADMIN — RANITIDINE 150 MG: 150 TABLET ORAL at 22:17

## 2019-09-04 RX ADMIN — Medication 5 ML: at 10:27

## 2019-09-04 RX ADMIN — VENLAFAXINE HYDROCHLORIDE 225 MG: 75 CAPSULE, EXTENDED RELEASE ORAL at 08:23

## 2019-09-04 RX ADMIN — BISACODYL 10 MG: 10 SUPPOSITORY RECTAL at 16:54

## 2019-09-04 RX ADMIN — MORPHINE SULFATE 15 MG: 15 TABLET, EXTENDED RELEASE ORAL at 22:16

## 2019-09-04 RX ADMIN — IBUPROFEN 600 MG: 600 TABLET ORAL at 16:45

## 2019-09-04 RX ADMIN — MORPHINE SULFATE 15 MG: 15 TABLET, EXTENDED RELEASE ORAL at 08:22

## 2019-09-04 RX ADMIN — FENTANYL CITRATE 25 MCG: 50 INJECTION INTRAMUSCULAR; INTRAVENOUS at 13:27

## 2019-09-04 RX ADMIN — HYDRALAZINE HYDROCHLORIDE 10 MG: 20 INJECTION INTRAMUSCULAR; INTRAVENOUS at 17:18

## 2019-09-04 RX ADMIN — Medication: at 08:32

## 2019-09-04 RX ADMIN — MIDAZOLAM 1 MG: 1 INJECTION INTRAMUSCULAR; INTRAVENOUS at 12:50

## 2019-09-04 RX ADMIN — ACETAMINOPHEN 1000 MG: 500 TABLET, FILM COATED ORAL at 08:23

## 2019-09-04 RX ADMIN — SENNOSIDES AND DOCUSATE SODIUM 2 TABLET: 8.6; 5 TABLET ORAL at 22:17

## 2019-09-04 RX ADMIN — ACETAMINOPHEN 1000 MG: 500 TABLET, FILM COATED ORAL at 22:17

## 2019-09-04 RX ADMIN — ONDANSETRON 4 MG: 2 INJECTION INTRAMUSCULAR; INTRAVENOUS at 11:29

## 2019-09-04 RX ADMIN — SENNOSIDES AND DOCUSATE SODIUM 2 TABLET: 8.6; 5 TABLET ORAL at 08:31

## 2019-09-04 RX ADMIN — DEXAMETHASONE 4 MG: 4 TABLET ORAL at 08:23

## 2019-09-04 RX ADMIN — DEXAMETHASONE SODIUM PHOSPHATE 4 MG: 4 INJECTION, SOLUTION INTRAMUSCULAR; INTRAVENOUS at 22:52

## 2019-09-04 RX ADMIN — Medication: at 20:45

## 2019-09-04 RX ADMIN — OXYCODONE HYDROCHLORIDE 10 MG: 10 TABLET ORAL at 16:45

## 2019-09-04 RX ADMIN — OXYCODONE HYDROCHLORIDE 10 MG: 10 TABLET ORAL at 14:14

## 2019-09-04 RX ADMIN — FENTANYL CITRATE 25 MCG: 50 INJECTION INTRAMUSCULAR; INTRAVENOUS at 12:50

## 2019-09-04 RX ADMIN — ACETAMINOPHEN 1000 MG: 500 TABLET, FILM COATED ORAL at 14:14

## 2019-09-04 RX ADMIN — LIDOCAINE HYDROCHLORIDE 8 ML: 10 INJECTION, SOLUTION EPIDURAL; INFILTRATION; INTRACAUDAL; PERINEURAL at 13:01

## 2019-09-04 RX ADMIN — DICLOFENAC 4 G: 10 GEL TOPICAL at 18:36

## 2019-09-04 RX ADMIN — DEXAMETHASONE 4 MG: 4 TABLET ORAL at 22:16

## 2019-09-04 RX ADMIN — LACTULOSE 20 G: 20 POWDER, FOR SOLUTION ORAL at 14:14

## 2019-09-04 RX ADMIN — HYDRALAZINE HYDROCHLORIDE 10 MG: 20 INJECTION INTRAMUSCULAR; INTRAVENOUS at 10:23

## 2019-09-04 RX ADMIN — OXYCODONE HYDROCHLORIDE 20 MG: 10 TABLET ORAL at 06:14

## 2019-09-04 RX ADMIN — BUSPIRONE HYDROCHLORIDE 15 MG: 15 TABLET ORAL at 08:23

## 2019-09-04 RX ADMIN — LORAZEPAM 0.5 MG: 2 INJECTION INTRAMUSCULAR; INTRAVENOUS at 20:45

## 2019-09-04 RX ADMIN — MIDAZOLAM 0.5 MG: 1 INJECTION INTRAMUSCULAR; INTRAVENOUS at 13:27

## 2019-09-04 RX ADMIN — OXYCODONE HYDROCHLORIDE 20 MG: 10 TABLET ORAL at 01:17

## 2019-09-04 RX ADMIN — Medication: at 16:45

## 2019-09-04 RX ADMIN — LIDOCAINE 1 PATCH: 560 PATCH PERCUTANEOUS; TOPICAL; TRANSDERMAL at 08:24

## 2019-09-04 RX ADMIN — BUSPIRONE HYDROCHLORIDE 15 MG: 15 TABLET ORAL at 22:16

## 2019-09-04 RX ADMIN — DICLOFENAC 4 G: 10 GEL TOPICAL at 14:22

## 2019-09-04 RX ADMIN — PROPRANOLOL HYDROCHLORIDE 60 MG: 60 CAPSULE, EXTENDED RELEASE ORAL at 08:23

## 2019-09-04 RX ADMIN — FENTANYL CITRATE 25 MCG: 50 INJECTION INTRAMUSCULAR; INTRAVENOUS at 13:00

## 2019-09-04 ASSESSMENT — PAIN DESCRIPTION - DESCRIPTORS
DESCRIPTORS: ACHING

## 2019-09-04 ASSESSMENT — ACTIVITIES OF DAILY LIVING (ADL)
ADLS_ACUITY_SCORE: 17

## 2019-09-04 NOTE — PROGRESS NOTES
[facility], [place]    Hematology / Oncology Progress Note    Date of Service (when I saw the patient): 09/04/2019     Assessment & Plan   Shaneka Alvarez is a 59 year old female with history of multiple sclerosis which is stable, T2 N1 M0 invasive lobular carcinoma of the right breast that was ER+/VA+ HER-2 negative s/p bilateral mastectomies, admitted after recent findings of metastatic lesions to to skull and to vertebrae along with progressive back pain.     CHANGES TODAY:  - CT guided bx of right iliac bone done today  - Pulmonary consult for EBUS on 9/5  - Rad onc tentatively planning to start XRT 9/5 pending prelim path  - Check CEA and C27-29   - Increase bowel regimen - add Lactulose and suppository      #Progressive severe back pain   #L1 compression fracture  #Metastic lesion in skull, thoracic and lumbar spine  MRI brain 8/21/19 with multiple enhancing calvarial lesions (a similar single lesion in C2 vertebral body), new since 7/10/2018 and consistent metastatic disease. T/L spine MRI 8/31/19 with numerous enhancing lesions of the T and L spine, largest around T9 and L1. No evidence of cord compression. Has a L1 compression fracture and impending L4. MRI C spine 9/2/19 also with numerous enhancing lesions.   - Rad Onc consulted--> Planning 10 fractions of XRT to L spine +/- C spine. Pt agreeable to completing XRT at [facility] and staying locally. Simulation scan completed 9/3. Rad onc tentatively planning to start XRT 9/5 pending prelim path.  - NSG consulted--> no surgical intervention, wear [place] brace when out of bed and HOB >30 degrees  - Continue Dex 4 mg BID  - MS Contin 15 mg BID (x9/2). Continue IV Morphine, PO Oxycodone 10-20 q3hrs prn.  - Miralax BID, Senna BID, Bisacodyl suppository and Lactulose prn     #Recurrent breast cancer vs. New primary  History of ER+VA+WTH2exm T2 N1 M0 breast cancer s/p chemo and radiation in 3412-0637, s/p R mod radical mastectomy and L  prophylactic mastectomy in 2016.  Finished 7 years of exemastone until 2014.   - PET scan 8/30/19 with numerous bone lesions (calvarium, spine, sacrum, pelvis, ribs most prominent at C7, T3, L1 and L4), hypermetabolic 3 cm lesion in LLL, and mediastinal/hilar LN. Concern for metastatic breast vs lung cancer.  - CT guided biopsy of right iliac lesion done 9/4, pathology pending  - Pulmonary consulted and kindly perform EBUS 9/5.  - CEA and C27-29 pending  - Will discuss palliative care consult tomorrow for pain/nausea assistance.  - Has follow-up appointment with Dr. Zepeda on 9/18/19     #Subacute productive cough without fevers, chills, night sweats  #Left lung nodule with GGO  Not coughing at present time but will order sputum cultures in case she brings up anything. In absence of any additional symptoms, signs, or labs concerning for infection will not do a full infection workup as of yet. Sputum culture & gram stain ordered. Procal is negative.    #HTN  Possibly exacerbated by pain. Is on Propranolol PTA.   - Discussed and will hold additional antihypertensives   - PRN Hydralazine      --- CHRONIC ISSUES ---  #MS with right UE and LE weakness  #Migraine  In particular weak in right lower leg with weakness of hip flexion, ankle dorsiflexion, and toe dorsiflexion without actual evidence of foot drop. Recent Brain imaging without evidence of new MS lesions.  - Continue Propranolol, magnesium held for now  - Not on any controller med for MS.   #Overactive bladder: Continue tolterodine  #Depression: Continue venlafaxine, buspar     FEN: regular diet, NPO after midnight.  Prophylaxis: Lovenox 40 mg every day (hold for procedure), Ranitidine 150 mg BID  Code: FULL  Disposition: 1-2 days based on pain control, radiation and biopsy coordination.     The patient's care was discussed with the Attending Physician, Dr. Arshad.    Jeannie ([facility]) YOLY Bucio  Hematology/Oncology  Pager: [phone]    Addendum:  Pt was seen  and evaluated by me independently of the SHAYNE.  Reviewed labs, imaging and clinical course.  We reviewed patient's imaging with lung and bone findings.  I discussed that given her history of tobacco use along with the imaging, this could be a primary lung cancer.  Given her breast cancer history however, it also could be breast cancer.  Will plan on biopsy of both bone and lung.  I spoke with Dr Granados in pulmonary who will do EBUS tomorrow.    Plan to begin XRT as soon as path results are available.    Check CEA and ca 2729.      Continue MS Contin 15 mg bid.  Bowel regimen.      Arelis Rangel MD     Interval History   Seen after biopsy, so still a bit tired.  at bedside and updated. Pain is well controlled during discussion. Took 90 mg Oxyocodone yesterday in addition to long acting Morphine. Still with no BM, but passing gas. No SOB, chest pain, abdominal pain.     Physical Exam   Temp: 95.5  F (35.3  C) Temp src: Axillary BP: (!) 149/95 Pulse: 84 Heart Rate: 63 Resp: 16 SpO2: 94 % O2 Device: None (Room air) Oxygen Delivery: 3 LPM  Vitals:    08/30/19 1842 09/02/19 0800 09/03/19 0752   Weight: 74.6 kg (164 lb 6.4 oz) 74.5 kg (164 lb 3.2 oz) 74 kg (163 lb 2.3 oz)     Vital Signs with Ranges  Temp:  [95.5  F (35.3  C)-97.3  F (36.3  C)] 95.5  F (35.3  C)  Pulse:  [62-84] 84  Heart Rate:  [60-81] 63  Resp:  [0-27] 16  BP: (124-164)/() 149/95  SpO2:  [91 %-100 %] 94 %  I/O last 3 completed shifts:  In: 650 [P.O.:600; I.V.:50]  Out: 2750 [Urine:2700; Emesis/NG output:50]    Constitutional: Pleasant female seen laying in bed. No apparent distress, and appears stated age.  Eyes: Lids and lashes normal, sclera clear, conjunctiva normal.  ENT: Normocephalic, oral cavity without erythema or exudates, gums normal and good dentition.   Respiratory: No increased work of breathing, good air exchange, clear to auscultation right, distant breath sounds left base, no crackles or wheezing.  Cardiovascular: Regular rate and  rhythm, normal S1 and S2, and no murmur noted.  GI: No masses or scars. +BS. Soft. No tenderness on palpation.  Skin: Limited bruising, no bleeding, no redness, no warmth, no rashes, no lesions, no jaundice.  Extremities: There is no redness, warmth, or swelling of the joints. No lower extremity edema. No cyanosis.  Neurologic: [place] brace on. Awake, alert, oriented to name, place and time.    Vascular access: PIV    Medications       acetaminophen  1,000 mg Oral TID     busPIRone  15 mg Oral BID     dexamethasone  4 mg Oral Q12H TIP     diclofenac  4 g Transdermal 4x Daily     heparin  5 mL Intracatheter Q28 Days     heparin lock flush  5-10 mL Intracatheter Q24H     ketamine 5% gabapentin 8% lidocaine 2.5%   Topical 4x Daily     lidocaine  1 patch Transdermal Q24H     lidocaine   Transdermal Q8H     lidocaine   Transdermal Q24h     morphine  15 mg Oral Q12H TIP     polyethylene glycol  17 g Oral BID     propranolol ER  60 mg Oral Daily     ranitidine  150 mg Oral BID     senna-docusate  1 tablet Oral BID    Or     senna-docusate  2 tablet Oral BID     sodium chloride (PF)  3 mL Intracatheter Q8H     tolterodine ER  4 mg Oral Daily     venlafaxine  225 mg Oral Daily       Data   ROUTINE IP LABS (Last four results)  BMP  Recent Labs   Lab 09/01/19  0605 08/30/19 2015    134   POTASSIUM 4.0 3.6   CHLORIDE 106 100   RAFAELA 8.3* 8.8   CO2 22 26   BUN 14 9   CR 0.72 0.79   GLC 91 92     CBC  Recent Labs   Lab 09/01/19 0605 08/30/19 2015   WBC 6.0 4.6   RBC 4.79 4.94   HGB 13.4 14.0   HCT 43.4 43.0   MCV 91 87   MCH 28.0 28.3   MCHC 30.9* 32.6   RDW 12.5 12.7    202     INR  Recent Labs   Lab 08/30/19 2015   INR 1.09

## 2019-09-04 NOTE — PLAN OF CARE
Care from 2354-1542  VSS, slightly hypertensive. Complaint of constant pain along spine and neck, scheduled morphine and creams given with some relief.  Oxy given x1 and ambien given for sleep. No N/V/D. Brace to be worn when HOB >30 and while ambulating. Pt up with stand by assist with use of walker. Continue with POC.     Problem: Adult Inpatient Plan of Care  Goal: Plan of Care Review  9/4/2019 0058 by Fernie Avelar, RN  Outcome: No Change     Problem: Adult Inpatient Plan of Care  Goal: Patient-Specific Goal (Individualization)  9/4/2019 0058 by Fernie Avelar, RN  Outcome: No Change     Problem: Adult Inpatient Plan of Care  Goal: Absence of Hospital-Acquired Illness or Injury  9/4/2019 0058 by Fernie Avelar, RN  Outcome: No Change     Problem: Adult Inpatient Plan of Care  Goal: Optimal Comfort and Wellbeing  9/4/2019 0058 by Fernie Avelar, RN  Outcome: No Change     Problem: Pain Acute  Goal: Optimal Pain Control  9/4/2019 0058 by Fernie Avelar, RN  Outcome: No Change

## 2019-09-04 NOTE — IR NOTE
Patient Name: Shaneka Alvarez  Medical Record Number: [id number]  Today's Date: 9/4/2019    Procedure: right iliac crest bone biopsy  Proceduralist: Dr. Campos Garrison MD; Dr. Christopher Lin    Sedation start time: 1250  Sedation end time: 1335  Sedation medications administered: 100 mcg fentanyl, 2 mg versed   Total sedation time: 45 minutes  Sedation Notes: Primary nurse reported took 0.5mg oral Ativan just prior to coming down.     Procedure start time: 1255  Puncture time: 1258  Procedure end time: 1335    Report given to: Akosua KELLOGG RN 7D  : n/a    Other Notes: Pt arrived to IR room CT2 from 7D. Consent reviewed. Patient denies questions regarding the procedure    Prior to transfer onto the CT table, RTR noted left chest port site appeared swollen. Patient reports no tenderness with previous flush and administration of zofran. Upon palpation needle did feel as if it was dislodged and no blood back was noted. Needle removed. X2 attempts to reaccess unsuccessful, as was x1 attempt at PIV. x1 attempt to access port unsuccessful by charge RN, as well as another by Dr. Garrison. Ultimately 22g IV started in LFA by DR. Garrison with ultrasound after x2 attempts. Ice pack applied to port site.    Pt positioned supine and monitored per protocol. Pathology paged at 1240. Target tissue identified. (9) tissue samples handed directly to pathology. Dressing applied. Dressing to right hip area clean, dry, flat. Upon departure- alert, oriented, calm. Respirations eupneic on room air.     Pt tolerated procedure without any noted complications. Pt transferred back to  for recovery.    Lisa Carolina RN

## 2019-09-04 NOTE — PROGRESS NOTES
Pt was simmed today.  Discussed the plan for radiation therapy to the lumbar spine and she is agreeable to starting treatment.  She stated that her pain is rated 5/10 and tolerable for her for the time being. Lying on our treatment table did not exacerbate her pain. I informed her that although there is a sense of urgency in starting her treatment if her pain can be temporarily controlled with IV/PO pain medication, my preference is to wait for preliminary path before starting treatment.  She verbalized understanding and is agreeable with this plan.  We've scheduled her to start tentatively for Thursday afternoon.  I will call Pathology Dept Wednesday afternoon for a preliminary read.    Mateo Keller

## 2019-09-04 NOTE — PLAN OF CARE
7D PT - Cancel. Pt at IR. Will attempt to see later if schedule permits otherwise will schedule her forward per POC.

## 2019-09-04 NOTE — PLAN OF CARE
00:00-07:00 am  AF -150/80's not meet requirement for Hydralazine  OVSS  Continue c/o lower back pain, requiring Oxycodone 20 mg x 2 doses with adequate relief  Continues wearing [place] back brace when HOB >30 degree Up with SBA with walker  Voiding spontaneously well , good UOP   No BM  Good active bowel sounds and passing flatus. On bowel regimen but declined Dulcolax suppository.  NPO at MN for right iliac crest lytic lesion bone bx today.  Pt resting/sleeping well between cares  Continue w/POC

## 2019-09-04 NOTE — CONSULTS
"         Interventional Pulmonology          Initial Inpatient Consultation Note                                       September 4, 2019            Shaneka Alvarez    Date of Admission: 8/30/2019  Reason for Consultation: Possible EBUS  Requesting Physician: Oncology       Assessment:   Shaneka Alvarez is a 59 year old woman admitted on 8/30/2019 with new, presumably metastatic bone lesions in the context of a history of T2N1M0 invasive lobular carcinoma of the right breast that was ER+/MA+ HER-2 negative . Interventional Pulmonology is consulted for possible biopsy of a newly identified PET-avid LLL ground glass opacity, and PET-avid hilar/mediastinal lymph nodes. A bone biopsy is scheduled for today. Concern is raised from the primary Oncology service that the process in her lungs may be a new, primary malignancy potentially separate from the gina process. I agree this is a possibility. Other considerations included non-malignant etiologies such as infection or less likely an inflammatory processes. It should be noted that she has experienced a few days of \"green\" sputum, however her cough is more chronic and in speaking to the primary Oncology service, their level of suspicion for malignancy is \"high\". With that said, the plan is as follows...    Plan:    Scheduled for EBUS and transbronchial needle/forceps biopsies 9/5/19 at 1500    NPO orders placed for 0500 tomorrow      Thank you for this consultation, we will continue to follow along. Please page [phone] with any questions or additional Interventional Pulmonology consultations.              Chief Complaint: \"I didn't know I had anything wrong in my lungs\"    History of Present Illness:   59 year old woman with history of T2N1M0 invasive lobular carcinoma of the right breast that was ER+/MA+ HER-2 negative. Admitted 8/30/19 with new, multiple bony lesions to axial skeleton consistent with metastatic disease radiographically. A bone biopsy is planned. The " last CT chest I see on file in our system is from 2016 which showed no disease within the lung parenchyma.  A PET/CT obtained on 8/30/19 demonstrated a PET-avid 3 cm ground glass nodular opacity in the left lower lobe, as well as avid hilar and mediastinal lymph nodes. Procalcitonin was negative, a sputum culture demonstrated normal thalia, and it seems that suspicion for pneumonia has been low. It seems that yesterday (in the documentation) concern was being raised that the process in her chest may be a new primary malignancy, potentially separate from the process now causing bone involvement; therefore, Pulmonology has been consulted for biopsy.                              Data:   All pertinent laboratory and imaging data reviewed.      8/30/19 PET/CT:  2. Lung - Hypermetabolic 3 cm ground glass opacity in the left lower  lobe, given patient is afebrile, highly concerning for adenocarcinoma  although infection remains in the differential.  (CT chest 8/24/2016  shows no lesion in this area).  2a. Two hypermetabolic mediastinal lymph nodes as well as one hilar  node suspicious for metastasis.                  Past Medical History:     Past Medical History:   Diagnosis Date     Breast cancer (H)     Age 44     Common migraine      H/O bilateral mastectomy      Mild major depression (H)      Multiple sclerosis (H)             Past Surgical History:     Past Surgical History:   Procedure Laterality Date     HC REMOVAL OF OVARY/TUBE(S)       HERNIA REPAIR, UMBILICAL       mastectomy  Bilateral 2006     MASTECTOMY, BILATERAL              Social History:     Social History     Socioeconomic History     Marital status:      Spouse name: Kash     Number of children: 3     Years of education: Not on file     Highest education level: Not on file   Occupational History     Employer: [employer]   Social Needs     Financial resource strain: Not on file     Food insecurity:     Worry: Not on file     Inability: Not on  file     Transportation needs:     Medical: Not on file     Non-medical: Not on file   Tobacco Use     Smoking status: Former Smoker     Types: Cigarettes     Last attempt to quit: 2005     Years since quittin.7     Smokeless tobacco: Never Used   Substance and Sexual Activity     Alcohol use: Yes     Alcohol/week: 1.2 oz     Types: 1 Glasses of wine, 1 Cans of beer per week     Comment: TWICE A WEEK     Drug use: No     Sexual activity: Yes     Partners: Male     Birth control/protection: None     Comment: not needed after chemo   Lifestyle     Physical activity:     Days per week: Not on file     Minutes per session: Not on file     Stress: Not on file   Relationships     Social connections:     Talks on phone: Not on file     Gets together: Not on file     Attends Mosque service: Not on file     Active member of club or organization: Not on file     Attends meetings of clubs or organizations: Not on file     Relationship status: Not on file     Intimate partner violence:     Fear of current or ex partner: Not on file     Emotionally abused: Not on file     Physically abused: Not on file     Forced sexual activity: Not on file   Other Topics Concern     Parent/sibling w/ CABG, MI or angioplasty before 65F 55M? Not Asked   Social History Narrative     Not on file       ETOH: n/a  Tobacco: Remove  Significant inhalational exposures: denies  PPD/TB exposure status: denies          Family History:   No known FH of lung disease or lung cancer         Allergies:   Please see allergy list which was reviewed this admission.         Medications:       acetaminophen  1,000 mg Oral TID     busPIRone  15 mg Oral BID     dexamethasone  4 mg Oral Q12H TIP     diclofenac  4 g Transdermal 4x Daily     heparin  5 mL Intracatheter Q28 Days     heparin lock flush  5-10 mL Intracatheter Q24H     ketamine 5% gabapentin 8% lidocaine 2.5%   Topical 4x Daily     lidocaine  1 patch Transdermal Q24H     lidocaine   Transdermal  Q8H     lidocaine   Transdermal Q24h     morphine  15 mg Oral Q12H TIP     polyethylene glycol  17 g Oral BID     propranolol ER  60 mg Oral Daily     ranitidine  150 mg Oral BID     senna-docusate  1 tablet Oral BID    Or     senna-docusate  2 tablet Oral BID     sodium chloride (PF)  3 mL Intracatheter Q8H     tolterodine ER  4 mg Oral Daily     venlafaxine  225 mg Oral Daily     bisacodyl **OR** bisacodyl **OR** bisacodyl, bisacodyl, heparin lock flush, HOLD MEDICATION, hydrALAZINE, ibuprofen, lactulose, lidocaine 4%, lidocaine (buffered or not buffered), LORazepam, LORazepam, magnesium citrate, magnesium sulfate, magnesium sulfate, melatonin, morphine, naloxone, ondansetron, ondansetron, oxyCODONE, potassium chloride, potassium chloride with lidocaine, potassium chloride, potassium chloride, potassium chloride, prochlorperazine, prochlorperazine, sodium chloride (PF), sodium chloride (PF), sodium chloride (PF), zolpidem         Review of Systems:   A 12 point review of systems is negative aside for as noted above         Physical Exam:   Temp:  [96.4  F (35.8  C)-97.3  F (36.3  C)] 97.3  F (36.3  C)  Pulse:  [62] 62  Heart Rate:  [60-78] 60  Resp:  [18-20] 18  BP: (125-165)/() 164/104  SpO2:  [93 %-100 %] 100 %    Intake/Output Summary (Last 24 hours) at 9/4/2019 1029  Last data filed at 9/4/2019 0801  Gross per 24 hour   Intake 530 ml   Output 2575 ml   Net -2045 ml       General: Awake, alert and in no apparent distress   EENT: mmm  Neck: Trachea supple/midline  Lungs: CTAB  Cardiovascular: Normal S1 and S2.  RRR.    Abdomen: Soft, non-tender, non-distended   MSK: No edema  Neurologic: AOx3, moving all extremities   Skin: Warm/dry         Son Zendejas MD, 9/4/2019, 10:29 AM  Interventional Pulmonology Fellow  Department of Pulmonary, Allergy, Critical Care & Sleep Medicine   Pager: [phone]

## 2019-09-04 NOTE — PROCEDURES
[facility], [place]    Procedure: IR Procedure Note  Date/Time: 9/4/2019 1:56 PM  Performed by: Christopher Lin MD  Authorized by: Christopher Lin MD     UNIVERSAL PROTOCOL   Site Marked: Yes  Prior Images Obtained and Reviewed:  Yes  Required items: Required blood products, implants, devices and special equipment available    Patient identity confirmed:  Verbally with patient  Patient was reevaluated immediately before administering moderate or deep sedation or anesthesia  Confirmation Checklist:  Patient's identity using two indicators, relevant allergies, procedure was appropriate and matched the consent or emergent situation and correct equipment/implants were available  Time out: Immediately prior to the procedure a time out was called    Preparation: Patient was prepped and draped in usual sterile fashion      SEDATION    Patient Sedated: Yes    Vital signs: Vital signs monitored during sedation    See dictated procedure note for full details.  Findings: Biopsy of right iliac bone lesion under CT guidance    Specimens: core needle biopsy specimens sent for pathological analysis    Complications: None    Condition: Stable    PROCEDURE   Patient Tolerance:  Patient tolerated the procedure well with no immediate complications    Time of Sedation in Minutes by Physician:  45

## 2019-09-04 NOTE — PROVIDER NOTIFICATION
"IR Staff Laura Ferguson called the writer of this note to inform the left chest pott cath site is swollen and removed the needle. Staff members attempted to re access without success, Now patient has a PIV. These messages relayed to  Jeannie (Otilio) Fortunato, #[id number] \"will come to assess' and wants to be notified  when patient returns form IR.  "

## 2019-09-04 NOTE — PRE-PROCEDURE
GENERAL PRE-PROCEDURE:   Procedure:  Pelvic bone lesion biopsy  Date/Time:  9/4/2019 11:22 AM    Verbal consent obtained?: Yes    Written consent obtained?: Yes    Risks and benefits: Risks, benefits and alternatives were discussed    Consent given by:  Patient  Patient states understanding of procedure being performed: Yes    Patient's understanding of procedure matches consent: Yes    Procedure consent matches procedure scheduled: Yes    Expected level of sedation:  Moderate  Appropriately NPO:  Yes  ASA Class:  Class 3- Severe systemic disease, definite functional limitations  Mallampati  :  Grade 2- soft palate, base of uvula, tonsillar pillars, and portion of posterior pharyngeal wall visible  Lungs:  Lungs clear with good breath sounds bilaterally  Heart:  Normal heart sounds and rate  History & Physical reviewed:  History and physical reviewed and no updates needed  Statement of review:  I have reviewed the lab findings, diagnostic data, medications, and the plan for sedation

## 2019-09-04 NOTE — PLAN OF CARE
Alert and oriented x 4. Vital signs stable. On room air. Back pain managed with oral and topical pain medications. Wears TLSO brace when up with 30 degree and above. Ambulates to bathroom with stand by assist, TLSO, walker. Right lower lateral abdomen biopsy site covered with Premapore, it is clean, dry and intact. PA came and saw patient. Plan:Continue care. Monitor left upper chest port site, do not access it today, per PA, NPO after midnight for bronchoscopy, Dulcolax suppository this evening.

## 2019-09-04 NOTE — PLAN OF CARE
OT/7D  Discharge Planner OT   Patient plan for discharge: home with assist  Current status: Pt mod A don brace. Pt SBA supine to sit and sit to stand. Pt ambulated 400 ft with SBA and walker. Pt tolerated well. Neurosurgery paged, clarified pt must shower with [place] brace ON at all times for shower. Rec pt shower at night to allow brace to dry.   Barriers to return to prior living situation: medical status  Recommendations for discharge: home with assist  Rationale for recommendations: pt will require increased assist due to spinal precautions, [place] brace, and decreased activity tolerance, family able to provide assist        Entered by: Melanie Mulligan 09/04/2019 11:46 AM

## 2019-09-05 ENCOUNTER — ANESTHESIA EVENT (OUTPATIENT)
Dept: SURGERY | Facility: CLINIC | Age: 59
DRG: 478 | End: 2019-09-05
Payer: COMMERCIAL

## 2019-09-05 ENCOUNTER — ANESTHESIA (OUTPATIENT)
Dept: SURGERY | Facility: CLINIC | Age: 59
DRG: 478 | End: 2019-09-05
Payer: COMMERCIAL

## 2019-09-05 LAB
APPEARANCE FLD: NORMAL
COLOR FLD: COLORLESS
GRAM STN SPEC: ABNORMAL
KOH PREP SPEC: NORMAL
LYMPHOCYTES NFR FLD MANUAL: 5 %
MONOS+MACROS NFR FLD MANUAL: 13 %
NEUTS BAND NFR FLD MANUAL: 82 %
SPECIMEN SOURCE FLD: NORMAL
SPECIMEN SOURCE: ABNORMAL
SPECIMEN SOURCE: NORMAL
WBC # FLD AUTO: 3638 /UL

## 2019-09-05 PROCEDURE — 36000064 ZZH SURGERY LEVEL 4 EA 15 ADDTL MIN - UMMC: Performed by: INTERNAL MEDICINE

## 2019-09-05 PROCEDURE — 25000132 ZZH RX MED GY IP 250 OP 250 PS 637: Performed by: STUDENT IN AN ORGANIZED HEALTH CARE EDUCATION/TRAINING PROGRAM

## 2019-09-05 PROCEDURE — 25000128 H RX IP 250 OP 636: Performed by: NURSE ANESTHETIST, CERTIFIED REGISTERED

## 2019-09-05 PROCEDURE — 87070 CULTURE OTHR SPECIMN AEROBIC: CPT | Performed by: INTERNAL MEDICINE

## 2019-09-05 PROCEDURE — 25000132 ZZH RX MED GY IP 250 OP 250 PS 637: Performed by: PHYSICIAN ASSISTANT

## 2019-09-05 PROCEDURE — 0BBF8ZX EXCISION OF RIGHT LOWER LUNG LOBE, VIA NATURAL OR ARTIFICIAL OPENING ENDOSCOPIC, DIAGNOSTIC: ICD-10-PCS | Performed by: INTERNAL MEDICINE

## 2019-09-05 PROCEDURE — 99222 1ST HOSP IP/OBS MODERATE 55: CPT | Performed by: INTERNAL MEDICINE

## 2019-09-05 PROCEDURE — 37000009 ZZH ANESTHESIA TECHNICAL FEE, EACH ADDTL 15 MIN: Performed by: INTERNAL MEDICINE

## 2019-09-05 PROCEDURE — 87633 RESP VIRUS 12-25 TARGETS: CPT | Performed by: INTERNAL MEDICINE

## 2019-09-05 PROCEDURE — 87205 SMEAR GRAM STAIN: CPT | Performed by: INTERNAL MEDICINE

## 2019-09-05 PROCEDURE — 25000125 ZZHC RX 250: Performed by: NURSE ANESTHETIST, CERTIFIED REGISTERED

## 2019-09-05 PROCEDURE — 25000128 H RX IP 250 OP 636: Performed by: INTERNAL MEDICINE

## 2019-09-05 PROCEDURE — 88173 CYTOPATH EVAL FNA REPORT: CPT | Performed by: INTERNAL MEDICINE

## 2019-09-05 PROCEDURE — 89051 BODY FLUID CELL COUNT: CPT | Performed by: INTERNAL MEDICINE

## 2019-09-05 PROCEDURE — 87102 FUNGUS ISOLATION CULTURE: CPT | Performed by: INTERNAL MEDICINE

## 2019-09-05 PROCEDURE — 87206 SMEAR FLUORESCENT/ACID STAI: CPT | Performed by: INTERNAL MEDICINE

## 2019-09-05 PROCEDURE — 25800030 ZZH RX IP 258 OP 636: Performed by: NURSE ANESTHETIST, CERTIFIED REGISTERED

## 2019-09-05 PROCEDURE — 88305 TISSUE EXAM BY PATHOLOGIST: CPT | Performed by: INTERNAL MEDICINE

## 2019-09-05 PROCEDURE — 27210794 ZZH OR GENERAL SUPPLY STERILE: Performed by: INTERNAL MEDICINE

## 2019-09-05 PROCEDURE — 88172 CYTP DX EVAL FNA 1ST EA SITE: CPT | Performed by: INTERNAL MEDICINE

## 2019-09-05 PROCEDURE — 88108 CYTOPATH CONCENTRATE TECH: CPT | Performed by: INTERNAL MEDICINE

## 2019-09-05 PROCEDURE — 87015 SPECIMEN INFECT AGNT CONCNTJ: CPT | Performed by: INTERNAL MEDICINE

## 2019-09-05 PROCEDURE — 87076 CULTURE ANAEROBE IDENT EACH: CPT | Performed by: INTERNAL MEDICINE

## 2019-09-05 PROCEDURE — 00000155 ZZHCL STATISTIC H-CELL BLOCK W/STAIN: Performed by: INTERNAL MEDICINE

## 2019-09-05 PROCEDURE — 87798 DETECT AGENT NOS DNA AMP: CPT | Performed by: INTERNAL MEDICINE

## 2019-09-05 PROCEDURE — 88341 IMHCHEM/IMCYTCHM EA ADD ANTB: CPT | Performed by: INTERNAL MEDICINE

## 2019-09-05 PROCEDURE — 25000132 ZZH RX MED GY IP 250 OP 250 PS 637: Performed by: INTERNAL MEDICINE

## 2019-09-05 PROCEDURE — 40000169 ZZH STATISTIC PRE-PROCEDURE ASSESSMENT I: Performed by: INTERNAL MEDICINE

## 2019-09-05 PROCEDURE — 87116 MYCOBACTERIA CULTURE: CPT | Performed by: INTERNAL MEDICINE

## 2019-09-05 PROCEDURE — 12000001 ZZH R&B MED SURG/OB UMMC

## 2019-09-05 PROCEDURE — 0B9J8ZX DRAINAGE OF LEFT LOWER LUNG LOBE, VIA NATURAL OR ARTIFICIAL OPENING ENDOSCOPIC, DIAGNOSTIC: ICD-10-PCS | Performed by: INTERNAL MEDICINE

## 2019-09-05 PROCEDURE — 36000066 ZZH SURGERY LEVEL 4 W FLUORO 1ST 30 MIN - UMMC: Performed by: INTERNAL MEDICINE

## 2019-09-05 PROCEDURE — 37000008 ZZH ANESTHESIA TECHNICAL FEE, 1ST 30 MIN: Performed by: INTERNAL MEDICINE

## 2019-09-05 PROCEDURE — 88342 IMHCHEM/IMCYTCHM 1ST ANTB: CPT | Performed by: INTERNAL MEDICINE

## 2019-09-05 PROCEDURE — 99232 SBSQ HOSP IP/OBS MODERATE 35: CPT | Performed by: INTERNAL MEDICINE

## 2019-09-05 PROCEDURE — 87210 SMEAR WET MOUNT SALINE/INK: CPT | Performed by: INTERNAL MEDICINE

## 2019-09-05 PROCEDURE — 82962 GLUCOSE BLOOD TEST: CPT

## 2019-09-05 PROCEDURE — 88312 SPECIAL STAINS GROUP 1: CPT | Performed by: INTERNAL MEDICINE

## 2019-09-05 PROCEDURE — 87081 CULTURE SCREEN ONLY: CPT | Performed by: INTERNAL MEDICINE

## 2019-09-05 PROCEDURE — 71000014 ZZH RECOVERY PHASE 1 LEVEL 2 FIRST HR: Performed by: INTERNAL MEDICINE

## 2019-09-05 PROCEDURE — 07B74ZX EXCISION OF THORAX LYMPHATIC, PERCUTANEOUS ENDOSCOPIC APPROACH, DIAGNOSTIC: ICD-10-PCS | Performed by: INTERNAL MEDICINE

## 2019-09-05 RX ORDER — FENTANYL CITRATE 50 UG/ML
INJECTION, SOLUTION INTRAMUSCULAR; INTRAVENOUS PRN
Status: DISCONTINUED | OUTPATIENT
Start: 2019-09-05 | End: 2019-09-05

## 2019-09-05 RX ORDER — OXYCODONE HCL 5 MG/5 ML
5 SOLUTION, ORAL ORAL EVERY 4 HOURS PRN
Status: CANCELLED | OUTPATIENT
Start: 2019-09-05

## 2019-09-05 RX ORDER — DEXAMETHASONE SODIUM PHOSPHATE 4 MG/ML
4 INJECTION, SOLUTION INTRA-ARTICULAR; INTRALESIONAL; INTRAMUSCULAR; INTRAVENOUS; SOFT TISSUE EVERY 10 MIN PRN
Status: DISCONTINUED | OUTPATIENT
Start: 2019-09-05 | End: 2019-09-05 | Stop reason: HOSPADM

## 2019-09-05 RX ORDER — SODIUM CHLORIDE, SODIUM LACTATE, POTASSIUM CHLORIDE, CALCIUM CHLORIDE 600; 310; 30; 20 MG/100ML; MG/100ML; MG/100ML; MG/100ML
INJECTION, SOLUTION INTRAVENOUS CONTINUOUS
Status: DISCONTINUED | OUTPATIENT
Start: 2019-09-05 | End: 2019-09-05 | Stop reason: HOSPADM

## 2019-09-05 RX ORDER — NALOXONE HYDROCHLORIDE 0.4 MG/ML
.1-.4 INJECTION, SOLUTION INTRAMUSCULAR; INTRAVENOUS; SUBCUTANEOUS
Status: CANCELLED | OUTPATIENT
Start: 2019-09-05 | End: 2019-09-06

## 2019-09-05 RX ORDER — FENTANYL CITRATE 50 UG/ML
25-50 INJECTION, SOLUTION INTRAMUSCULAR; INTRAVENOUS
Status: DISCONTINUED | OUTPATIENT
Start: 2019-09-05 | End: 2019-09-05

## 2019-09-05 RX ORDER — ONDANSETRON 4 MG/1
4 TABLET, ORALLY DISINTEGRATING ORAL EVERY 30 MIN PRN
Status: DISCONTINUED | OUTPATIENT
Start: 2019-09-05 | End: 2019-09-05

## 2019-09-05 RX ORDER — DEXAMETHASONE SODIUM PHOSPHATE 4 MG/ML
INJECTION, SOLUTION INTRA-ARTICULAR; INTRALESIONAL; INTRAMUSCULAR; INTRAVENOUS; SOFT TISSUE PRN
Status: DISCONTINUED | OUTPATIENT
Start: 2019-09-05 | End: 2019-09-05

## 2019-09-05 RX ORDER — LABETALOL HYDROCHLORIDE 5 MG/ML
INJECTION, SOLUTION INTRAVENOUS PRN
Status: DISCONTINUED | OUTPATIENT
Start: 2019-09-05 | End: 2019-09-05

## 2019-09-05 RX ORDER — SODIUM CHLORIDE, SODIUM LACTATE, POTASSIUM CHLORIDE, CALCIUM CHLORIDE 600; 310; 30; 20 MG/100ML; MG/100ML; MG/100ML; MG/100ML
INJECTION, SOLUTION INTRAVENOUS CONTINUOUS PRN
Status: DISCONTINUED | OUTPATIENT
Start: 2019-09-05 | End: 2019-09-05

## 2019-09-05 RX ORDER — ONDANSETRON 4 MG/1
4 TABLET, ORALLY DISINTEGRATING ORAL EVERY 30 MIN PRN
Status: DISCONTINUED | OUTPATIENT
Start: 2019-09-05 | End: 2019-09-05 | Stop reason: HOSPADM

## 2019-09-05 RX ORDER — ONDANSETRON 2 MG/ML
INJECTION INTRAMUSCULAR; INTRAVENOUS PRN
Status: DISCONTINUED | OUTPATIENT
Start: 2019-09-05 | End: 2019-09-05

## 2019-09-05 RX ORDER — HYDRALAZINE HYDROCHLORIDE 20 MG/ML
2.5-5 INJECTION INTRAMUSCULAR; INTRAVENOUS EVERY 10 MIN PRN
Status: DISCONTINUED | OUTPATIENT
Start: 2019-09-05 | End: 2019-09-05 | Stop reason: HOSPADM

## 2019-09-05 RX ORDER — LIDOCAINE HYDROCHLORIDE 10 MG/ML
INJECTION, SOLUTION INFILTRATION; PERINEURAL PRN
Status: DISCONTINUED | OUTPATIENT
Start: 2019-09-05 | End: 2019-09-05

## 2019-09-05 RX ORDER — HYDROMORPHONE HYDROCHLORIDE 1 MG/ML
.3-.5 INJECTION, SOLUTION INTRAMUSCULAR; INTRAVENOUS; SUBCUTANEOUS EVERY 5 MIN PRN
Status: DISCONTINUED | OUTPATIENT
Start: 2019-09-05 | End: 2019-09-05 | Stop reason: HOSPADM

## 2019-09-05 RX ORDER — PROPOFOL 10 MG/ML
INJECTION, EMULSION INTRAVENOUS PRN
Status: DISCONTINUED | OUTPATIENT
Start: 2019-09-05 | End: 2019-09-05

## 2019-09-05 RX ORDER — PROPOFOL 10 MG/ML
INJECTION, EMULSION INTRAVENOUS CONTINUOUS PRN
Status: DISCONTINUED | OUTPATIENT
Start: 2019-09-05 | End: 2019-09-05

## 2019-09-05 RX ORDER — METOPROLOL TARTRATE 1 MG/ML
1-2 INJECTION, SOLUTION INTRAVENOUS EVERY 5 MIN PRN
Status: DISCONTINUED | OUTPATIENT
Start: 2019-09-05 | End: 2019-09-05 | Stop reason: HOSPADM

## 2019-09-05 RX ORDER — ONDANSETRON 2 MG/ML
4 INJECTION INTRAMUSCULAR; INTRAVENOUS EVERY 30 MIN PRN
Status: DISCONTINUED | OUTPATIENT
Start: 2019-09-05 | End: 2019-09-05 | Stop reason: HOSPADM

## 2019-09-05 RX ORDER — FENTANYL CITRATE 50 UG/ML
25-50 INJECTION, SOLUTION INTRAMUSCULAR; INTRAVENOUS
Status: DISCONTINUED | OUTPATIENT
Start: 2019-09-05 | End: 2019-09-05 | Stop reason: HOSPADM

## 2019-09-05 RX ORDER — SODIUM CHLORIDE, SODIUM LACTATE, POTASSIUM CHLORIDE, CALCIUM CHLORIDE 600; 310; 30; 20 MG/100ML; MG/100ML; MG/100ML; MG/100ML
INJECTION, SOLUTION INTRAVENOUS CONTINUOUS
Status: DISCONTINUED | OUTPATIENT
Start: 2019-09-05 | End: 2019-09-05

## 2019-09-05 RX ORDER — MEPERIDINE HYDROCHLORIDE 25 MG/ML
12.5 INJECTION INTRAMUSCULAR; INTRAVENOUS; SUBCUTANEOUS
Status: DISCONTINUED | OUTPATIENT
Start: 2019-09-05 | End: 2019-09-05 | Stop reason: HOSPADM

## 2019-09-05 RX ORDER — ONDANSETRON 2 MG/ML
4 INJECTION INTRAMUSCULAR; INTRAVENOUS EVERY 30 MIN PRN
Status: DISCONTINUED | OUTPATIENT
Start: 2019-09-05 | End: 2019-09-05

## 2019-09-05 RX ORDER — NALOXONE HYDROCHLORIDE 0.4 MG/ML
.1-.4 INJECTION, SOLUTION INTRAMUSCULAR; INTRAVENOUS; SUBCUTANEOUS
Status: DISCONTINUED | OUTPATIENT
Start: 2019-09-05 | End: 2019-09-05 | Stop reason: HOSPADM

## 2019-09-05 RX ADMIN — ONDANSETRON 8 MG: 2 INJECTION INTRAMUSCULAR; INTRAVENOUS at 02:06

## 2019-09-05 RX ADMIN — PROPRANOLOL HYDROCHLORIDE 60 MG: 60 CAPSULE, EXTENDED RELEASE ORAL at 20:58

## 2019-09-05 RX ADMIN — LABETALOL HYDROCHLORIDE 5 MG: 5 INJECTION INTRAVENOUS at 16:34

## 2019-09-05 RX ADMIN — DEXAMETHASONE SODIUM PHOSPHATE 10 MG: 4 INJECTION, SOLUTION INTRA-ARTICULAR; INTRALESIONAL; INTRAMUSCULAR; INTRAVENOUS; SOFT TISSUE at 16:28

## 2019-09-05 RX ADMIN — PROPOFOL 110 MG: 10 INJECTION, EMULSION INTRAVENOUS at 16:15

## 2019-09-05 RX ADMIN — VENLAFAXINE HYDROCHLORIDE 225 MG: 75 CAPSULE, EXTENDED RELEASE ORAL at 20:58

## 2019-09-05 RX ADMIN — Medication: at 20:54

## 2019-09-05 RX ADMIN — DEXAMETHASONE SODIUM PHOSPHATE 4 MG: 4 INJECTION, SOLUTION INTRAMUSCULAR; INTRAVENOUS at 20:05

## 2019-09-05 RX ADMIN — ONDANSETRON 4 MG: 2 INJECTION INTRAMUSCULAR; INTRAVENOUS at 16:44

## 2019-09-05 RX ADMIN — PHENYLEPHRINE HYDROCHLORIDE 150 MCG: 10 INJECTION INTRAVENOUS at 16:44

## 2019-09-05 RX ADMIN — PHENYLEPHRINE HYDROCHLORIDE 150 MCG: 10 INJECTION INTRAVENOUS at 16:38

## 2019-09-05 RX ADMIN — SUGAMMADEX 200 MG: 100 INJECTION, SOLUTION INTRAVENOUS at 16:51

## 2019-09-05 RX ADMIN — LIDOCAINE 1 PATCH: 560 PATCH PERCUTANEOUS; TOPICAL; TRANSDERMAL at 09:10

## 2019-09-05 RX ADMIN — ONDANSETRON 8 MG: 2 INJECTION INTRAMUSCULAR; INTRAVENOUS at 08:03

## 2019-09-05 RX ADMIN — MIDAZOLAM 2 MG: 1 INJECTION INTRAMUSCULAR; INTRAVENOUS at 15:58

## 2019-09-05 RX ADMIN — PROPOFOL 150 MCG/KG/MIN: 10 INJECTION, EMULSION INTRAVENOUS at 16:15

## 2019-09-05 RX ADMIN — Medication: at 11:38

## 2019-09-05 RX ADMIN — PROCHLORPERAZINE EDISYLATE 10 MG: 5 INJECTION INTRAMUSCULAR; INTRAVENOUS at 06:27

## 2019-09-05 RX ADMIN — LIDOCAINE HYDROCHLORIDE 100 MG: 10 INJECTION, SOLUTION INFILTRATION; PERINEURAL at 16:15

## 2019-09-05 RX ADMIN — ROCURONIUM BROMIDE 30 MG: 10 INJECTION INTRAVENOUS at 16:15

## 2019-09-05 RX ADMIN — BUSPIRONE HYDROCHLORIDE 15 MG: 15 TABLET ORAL at 20:05

## 2019-09-05 RX ADMIN — SODIUM CHLORIDE, POTASSIUM CHLORIDE, SODIUM LACTATE AND CALCIUM CHLORIDE: 600; 310; 30; 20 INJECTION, SOLUTION INTRAVENOUS at 15:55

## 2019-09-05 RX ADMIN — Medication 186 ML: at 11:50

## 2019-09-05 RX ADMIN — TOLTERODINE 4 MG: 4 CAPSULE, EXTENDED RELEASE ORAL at 20:59

## 2019-09-05 RX ADMIN — FENTANYL CITRATE 150 MCG: 50 INJECTION, SOLUTION INTRAMUSCULAR; INTRAVENOUS at 16:15

## 2019-09-05 RX ADMIN — DICLOFENAC 4 G: 10 GEL TOPICAL at 20:59

## 2019-09-05 RX ADMIN — MORPHINE SULFATE 4 MG: 2 INJECTION, SOLUTION INTRAMUSCULAR; INTRAVENOUS at 03:05

## 2019-09-05 ASSESSMENT — ACTIVITIES OF DAILY LIVING (ADL)
ADLS_ACUITY_SCORE: 17

## 2019-09-05 NOTE — PLAN OF CARE
Interventional Pulmonology Update:  Patient seen again this AM. Doing well. Aware of plans for EBUS and transbronchial biopsies this afternoon at 1500. Discussed the procedure and she is agreeable. Labs checked, remains NPO.    Son Zendejas MD, 9/5/2019, 7:44 AM  Interventional Pulmonology Fellow  Department of Pulmonary, Allergy, Critical Care & Sleep Medicine   Pager: [phone]

## 2019-09-05 NOTE — PLAN OF CARE
Patient remains A&O reported nausea relief from IV zofran and finally good bowel movement result from pink lady edema. Patient sent to 3C for bronch left unit around 1400- overlapping with radiation appointment- coordinating with departments for patient to go straight to radiation apt after bronch.     Update- radiation will start tomorrow

## 2019-09-05 NOTE — PLAN OF CARE
OT 7D: cancel, pt declining participation in OT this AM then off floor for procedure this PM, will reschedule.

## 2019-09-05 NOTE — PLAN OF CARE
00:00-07:00 am  AF Elevated BP continues in mid 150's/90's, not meet Hydralazine requirement.  Worsening nausea since yesterday  Emesis x 1 of 100 ml  Zofran and Compazine with adequate control in between  Pain in back receiving Morphine 4 mg IV x1 with relief  Still feeling constipated despite aggressive bowel regimen intervention yesterday.  Reports very small BM but continues passing flatus with active bowel sounds  Abdominal X-ray done concerning SBO, pending results.  MD notified and viewed images, suggested to continues with NPO    NPO at midnight for bronchoscopy today at 15:00   Up with SBA using walker  Voiding well, no BM  Using TLSO brace if HOB>30 degrees   Pt endorses resting/sleeping well  Continue w/POC

## 2019-09-05 NOTE — CONSULTS
Patient returned your call. Please call again; patient stated ok to leave a message if necessary.   [facility]  Palliative Care Consultation Note    Patient: Shaneka Alvarez  Date of Admission:  8/30/2019    Requesting Clinician / Team: heme/onc  Reason for consult:   Symptom management  Patient and family support    Recommendations:    Visit and recommendations limited today due to patient request -didn't want to talk, wanted to be left alone    Will reassess symptom management tomorrow - per pt her pain and nausea are at bay currently and she has been avoiding most PO medications today in effort to avoid medication related nausea. Reviewed medications and orders appear appropriate. She may be getting better pain control now that dexamethasone is kicking in and so may not need the MS Contin (pt refused AM dose) but will reassess tomorrow.     She is clearly overwhelmed and having anxiety in setting of her medical condition. Reviewed palliative team role and offered counseling and  support. She declines additional support today but I anticipate over time she may be open to this and would really benefit. Will reassess tomorrow.     These recommendations have been discussed with heme/onc SHREYAS Bucio.      Thank you for the opportunity to participate in the care of this patient and family. Our team: will continue to follow.     During regular M-F work hours -- if you are not sure who specifically to contact -- please contact us by sending a text page to our team consult pager at [phone].    After regular work hours and on weekends/holidays, you can call our answering service at [phone]. Also, who's on call for us is available in [employer].       Total time spent was 55 minutes,  >50% of time was spent counseling and/or coordination of care regarding symptoms and support.    Shara Baker MD / Palliative Medicine / Pager [phone] / [facility] Inpatient Team Consult Pager [phone] (answered 8am-430pm M-F) - ok to text page via [employer] / After-Hours Answering  Service [phone] / Palliative Clinic in the [facility] at the [facility] [place] - [phone] (scheduling); [phone] (triage).      Assessments:  Sahneka Alvarez is a 59 year old female with hx MS (currently stable, baseline mild right leg and arm weakness) and breast cancer s/p bilateral mastectomies, and recent findings of metastatic disease on imaging, was admitted for pain control and accelerated workup after her PET scan due to increasing back pain and preliminary finding of pathologic fractures of L1, L5. S/p inguinal LN biopsy 9/4 and plan for EUBS with bx today, path pending.     Today, the patient was seen for:  Metastatic cancer, primary path pending  Back pain  Nausea  Anxiety  support    Prognosis, Goals, & Planning:      Functional Status just prior to hospitalization: 1 (Restricted in physically strenuous activity but ambulatory and able to carry out work of a light or sedentary nature)      Prognosis, Goals, and/or Advance Care Planning were addressed today: No        Summary/Comments:       Patient's decision making preferences: unable to assess          Patient has decision-making capacity today for complex decisions: Yes            I have concerns about the patient/family's health literacy today: No           Patient has a completed Health Care Directive: No.       Code status: full code    Coping, Meaning, & Spirituality:   Mood, coping, and/or meaning in the context of serious illness were addressed today: Yes  Summary/Comments: reports some Jainism based spirituality but does not attend [employer]ism and not interested in  support at this time. Says talking about her medical diagnosis and the plan is hard and makes her feel anxious. She shut down during my visit and politely asked me to leave, saying she really just needed to be alone. She seems to have a lot of anxiety but is not able to talk about it with me today.     Social:     Living situation: lives with     Key family /  "caregivers: , has 6 adult children that are scattered, her closest child that lives locally is daughter Nemesio (prefers They pronoun)    Occupational history:     History of Present Illness:  History gathered today from: patient, medical chart, medical team members    58 yo female with hx MS (stable), T2, N1M0 invasive lobular carcinoma of right breast that was ER+/SD+ HER-2 negative s/p bilateral mastectomies, admitted after recent findings of metastatic lesions to to skull and to vertebrae along with progressive back pain with L1 and L5 compression fracture and lumbar spine lesions.    yesterday had CT guided biopsy of ilac bone. Today planning EBUS and transbronchial biopsies in afternoon. Rad onc tentatively planning radiation to L spine +/- C spine pending path results.     For pain she is on dex 4mg BID, scheduled tylenol, lidocaine patch and started MS Contin 15mg q 12 2 days ago and using prn oxycodone. She has limited engagement with the interview today. Says pain is \"all over\" but does specifically mention lower back and does not want me to touch her low back. Denies any radicular pain. She really is not able to tell me what she thinks of her pain medications. Was not on PTA opioid or other pain medications per her report to me but other notes mention ibuprofen and tylenol use. Yesterday she reports having a lot of nausea and vomiting throughout day and she worries it is from the pain medications so has declines most of her medications this morning and has not taken any prn oxycodone. She says pain an nausea are at bay currently.  She has not had a BM since admission but did have an enema today, would not tell me if there were good results. When I ask her about her anxiety she just says she wants to be left alone.       Key Palliative Symptom Data:  # Pain severity the last 12 hours: low  # Dyspnea severity the last 12 hours: none  # Nausea severity the last 12 hours: low  # " Anxiety severity the last 12 hours: moderate    Patient is on opioids: assessed and bowels ok/no needed changes to plan of care today.    ROS:  Comprehensive ROS is reviewed and is negative except as here & per HPI:      Past Medical History:  Past Medical History:   Diagnosis Date     Breast cancer (H)     Age 44     Common migraine      H/O bilateral mastectomy      Mild major depression (H)      Multiple sclerosis (H)         Past Surgical History:  Past Surgical History:   Procedure Laterality Date     HC REMOVAL OF OVARY/TUBE(S)       HERNIA REPAIR, UMBILICAL       mastectomy  Bilateral 2006     MASTECTOMY, BILATERAL           Family History:  Family History   Problem Relation Age of Onset     Breast Cancer Maternal Aunt 50         at 85     Breast Cancer Cousin 40     Breast Cancer Mother 49        2nd primary, contralateral breast at 69;  at 86     Melanoma Mother      Breast Cancer Maternal Grandmother 40     Ovarian Cancer Maternal Grandmother      Breast Cancer Paternal Grandmother 50         at 60     Brain Cancer Cousin 20     Breast Cancer Paternal Aunt 50         at 60     Breast Cancer Cousin 45        paternal cousin         Allergies:  Allergies   Allergen Reactions     Bactrim [Sulfamethoxazole W/Trimethoprim]      Internal bleeding     Copaxone [Glatiramer] Hives        Medications:  I have reviewed this patient's medication profile and medications from this hospitalization.    Of note she refused her oral medications today   Noted scheduled meds are:  Tylenol 1000mg tid  Buspirone 15mg bid  Diclofenac gel  Ketamine-aurelio-lido cream qid  Lidocaine patch (using)  Ms contin 15mg q 12 started evening of  - refused thsi am  Pink lady enema   miralax bid  Senna-docusate 1-2 bid  tolterodine   Venlafaxine 225mg daily      Noted PRN meds are:  Bisacodyl  Ibuprofen 600mg q6 hours prn  Lactulose prn  Lorazepam IV and PO options - x 1 dose yetserday  Morphine 2-4mg IV q 2 hours  prn  Ondansetron IV and PO options  Oxycodone 10-20mg q 3 hours prn  Compazine  Zolpidem 5mg       Physical Exam:  Vital Signs: Temp: 97.3  F (36.3  C) Temp src: Oral BP: (!) 158/106 Pulse: 84 Heart Rate: 88 Resp: 14 SpO2: 96 % O2 Device: None (Room air) Oxygen Delivery: 3 LPM  Weight: 163 lbs 2.25 oz   Gen: sitting/lying in bed. Appears comfortable   HEENT: NCAT. Conjunctiva clear. Sclera anicteric .  CV: RRR , Peripheral perfusion intact.   Resp: unlabored work of breathing, on room air  Abd: soft, nt, nd, +BS   Msk: no gross deformity, no sarcopenia; reports pain with palpation midline lumbar spine area  Skin:  no jaundice  Ext: warm, well perfused.   Neuro: face symmetric. EOM, vision, hearing grossly intact. Speech fluent. Moves all extremities   Mental status/Psych: alert. Oriented. Asks/answers questions appropriately. Affect is flat and disengaged, closed eyes during much of visit and unable to make eye contact      Data reviewed:  Recent imaging reviewed, my comments on pertinents:   MR C spine 9/2  Impression:   1. Numerous enhancing lesions in the cervical spine as above  concerning for metastatic disease. No enhancing foci within the spinal  cord or thecal sac.  2. Mild multilevel cervical spondylosis without significant spinal  canal stenosis or neural foraminal narrowing.    8/31 lumbar C spine  Impression:  1. L1 compression fracture and vertebral body hypodensity likely  representing a pathologic fracture. No spinal canal stenosis.  2. Hypodense lesions in the all vertebral bodies and posterior  elements, sacrum and bilateral ala consistent with metastatic disease.  3. Mild spinal canal narrowing at L3-L4.  4. Nonobstructing renal stone in the inferior pole of the left kidney.    8/30/19 PET  IMPRESSION:   In this patient with history of breast cancer,  in summary: numerous  bone metastases, hypermetabolic lung mass and mediastinal lymph nodes.   Favor new lung cancer over recurrent breast  cancer.  1. Bones -   1a. Numerous round lytic non-FDG avid lesion of the calvarium  throughout, corresponding to recent brain MRI findings, compatible  with calvarium metastases.   1b. Diffuse lytic FDG avid and non-FDG avid bone lesion involving  spine, sacrum, pelvic bones and the bilateral ribs. Most prominent at  C7, T3, L1, L4. Concerning for impending collapse of L4. Multiple  right rib pathologic fractures.  1C. L1 pathologic fracture.     2. Lung - Hypermetabolic 3 cm ground glass opacity in the left lower  lobe, given patient is afebrile, highly concerning for adenocarcinoma  although infection remains in the differential.  (CT chest 8/24/2016  shows no lesion in this area).  2a. Two hypermetabolic mediastinal lymph nodes as well as one hilar  node suspicious for metastasis.           Recent lab data reviewed, my comments on pertinents:   GFR>90  CA [phone]

## 2019-09-05 NOTE — ANESTHESIA POSTPROCEDURE EVALUATION
Anesthesia POST Procedure Evaluation    Patient: Shaneka Alvarez   MRN:     [id number] Gender:   female   Age:    59 year old :      1960        Preoperative Diagnosis: Lung Mass   Procedure(s):  Flexible Bronchoscopy, Endobronchial Ultrasound, Radial Probe  Transbronchial Biopsies   Postop Comments: No value filed.       Anesthesia Type:  Not documented  General    Reportable Event: NO     PAIN: Uncomplicated   Sign Out status: Comfortable, Well controlled pain     PONV: No PONV   Sign Out status:  No Nausea or Vomiting     Neuro/Psych: Uneventful perioperative course   Sign Out Status: Preoperative baseline; Age appropriate mentation     Airway/Resp.: Uneventful perioperative course   Sign Out Status: Non labored breathing, age appropriate RR; Resp. Status within EXPECTED Parameters     CV: Uneventful perioperative course   Sign Out status: Appropriate BP and perfusion indices; Appropriate HR/Rhythm     Disposition:   Sign Out in:  PACU  Disposition:  Phase II; Home  Recovery Course: Uneventful  Follow-Up: Not required           Last Anesthesia Record Vitals:  CRNA VITALS  2019 1629 - 2019 1729      2019             SpO2:  99 %    EKG:  Sinus tachycardia          Last PACU Vitals:  Vitals Value Taken Time   /98 2019  5:30 PM   Temp     Pulse 90 2019  5:30 PM   Resp 18 2019  5:30 PM   SpO2 96 % 2019  5:38 PM   Temp src     NIBP 150/95 2019  5:03 PM   Pulse     SpO2 99 % 2019  5:04 PM   Resp     Temp     Ht Rate 98 2019  5:03 PM   Temp 2     Vitals shown include unvalidated device data.      Electronically Signed By: Marcelino Noriega MD, 2019, 5:39 PM

## 2019-09-05 NOTE — PROGRESS NOTES
Called regarding recurrent emesis. Patient was not able to take evening meds. Nursing staff raised concern regarding SBO. KUB obtained to r/o SBO/ilues and guide anti-emetic choice. PO Decadron was thrown up. With presence of intracranial met will give IV decadron until patient is able to tolerate oral. Will monitor for FND.     Tyree Conti MD on 9/4/2019 at 10:40 PM

## 2019-09-05 NOTE — PLAN OF CARE
Assumed care of Patient from [id number]. AVSS on RA. Pain managed with oral and topical medications. Patient has been nauseated most of the evening, zofran and ativan provided with some relief. Patient vomited all oral medications provided tonight and IV decadron ordered for replacement. MD aware, and will look at nausea regime for IV medications. Up wit SBA with TLSO brace. Port infiltrated today, de accessed and not to be used until cleared by the medical team in the AM. Patient is NPO at midnight for bronchoscopy tomorrow. Monitor vitals/gen status and continue with POC.

## 2019-09-05 NOTE — ANESTHESIA PREPROCEDURE EVALUATION
Anesthesia Pre-Procedure Evaluation    Patient: Shaneka Alvarez   MRN:     [id number] Gender:   female   Age:    59 year old :      1960        Preoperative Diagnosis: Lung Mass   Procedure(s):  Flexible Bronchoscopy, Endobronchial Ultrasound, Radial Probe  Transbronchial Biopsies     Past Medical History:   Diagnosis Date     Breast cancer (H)     Age 44     Common migraine      H/O bilateral mastectomy      Mild major depression (H)      Multiple sclerosis (H)       Past Surgical History:   Procedure Laterality Date     HC REMOVAL OF OVARY/TUBE(S)       HERNIA REPAIR, UMBILICAL       mastectomy  Bilateral 2006     MASTECTOMY, BILATERAL            Anesthesia Evaluation     .             ROS/MED HX    ENT/Pulmonary:       Neurologic:     (+)neuropathy - multiple sclerosis, Multiple Sclerosis limitations: wears brace,     Cardiovascular:         METS/Exercise Tolerance:     Hematologic:         Musculoskeletal:   (+)  other musculoskeletal- spine mets      GI/Hepatic:         Renal/Genitourinary:         Endo:         Psychiatric:         Infectious Disease:         Malignancy:   (+)   Metastatic breast        Other:    (+) H/O Chronic Pain,                       PHYSICAL EXAM:   Mental Status/Neuro:    Airway: Facies: Feasible  Mallampati: II  Mouth/Opening: Full   Respiratory: Auscultation: CTAB      CV: Rhythm: Regular  Heart: Normal Sounds   Comments:                      LABS:  CBC:   Lab Results   Component Value Date    WBC 9.1 2019    WBC 6.0 2019    HGB 15.7 2019    HGB 13.4 2019    HCT 47.8 (H) 2019    HCT 43.4 2019     2019     2019     BMP:   Lab Results   Component Value Date     2019     2019    POTASSIUM 3.6 2019    POTASSIUM 4.0 2019    CHLORIDE 100 2019    CHLORIDE 106 2019    CO2 28 2019    CO2 22 2019    BUN 11 2019    BUN 14 2019    CR 0.82 2019    CR  "0.72 09/01/2019    GLC 89 09/04/2019    GLC 91 09/01/2019     COAGS:   Lab Results   Component Value Date    INR 1.04 09/04/2019     POC: No results found for: BGM, HCG, HCGS  OTHER:   Lab Results   Component Value Date    A1C 5.4 12/29/2016    RAFAELA 9.0 09/04/2019    ALBUMIN 3.7 12/29/2016    PROTTOTAL 7.5 12/29/2016    ALT 32 12/29/2016    AST 26 12/29/2016    ALKPHOS 103 12/29/2016    BILITOTAL 0.4 12/29/2016    TSH 1.11 12/08/2014        Preop Vitals    BP Readings from Last 3 Encounters:   09/05/19 (!) 155/107   08/15/19 (!) 135/97   07/16/19 117/79    Pulse Readings from Last 3 Encounters:   09/04/19 84   08/15/19 82   07/16/19 80      Resp Readings from Last 3 Encounters:   09/05/19 16   08/15/19 16   07/24/18 18    SpO2 Readings from Last 3 Encounters:   09/05/19 96%   08/15/19 97%   07/24/18 98%      Temp Readings from Last 1 Encounters:   09/05/19 36.4  C (97.6  F) (Oral)    Ht Readings from Last 1 Encounters:   08/30/19 1.575 m (5' 2\")      Wt Readings from Last 1 Encounters:   09/03/19 74 kg (163 lb 2.3 oz)    Estimated body mass index is 29.84 kg/m  as calculated from the following:    Height as of this encounter: 1.575 m (5' 2\").    Weight as of this encounter: 74 kg (163 lb 2.3 oz).     LDA:  Peripheral IV 09/04/19 Left Lower forearm (Active)   Site Assessment WDL 9/5/2019  8:07 AM   Line Status Saline locked 9/5/2019  8:07 AM   Phlebitis Scale 0-->no symptoms 9/5/2019  8:07 AM   Infiltration Scale 0 9/5/2019  8:07 AM   Number of days: 1       Port A Cath Single Left Chest wall (Active)   Access Date 09/03/19 9/3/2019  8:00 PM   Access Attempts Other (Comment) 9/4/2019 12:00 PM   Gauge/Length 1.25 inch 9/4/2019 12:00 PM   Site Assessment Edematous 9/5/2019 12:45 AM   Line Status Other (Comment) 9/5/2019 12:45 AM   Extravasation? No 9/4/2019 12:00 PM   Dressing Intervention No dressing 9/5/2019 12:45 AM   Dressing change due 09/10/19 9/4/2019  1:00 AM   Needle Change Due 09/10/19 9/4/2019  1:00 AM   Line " Necessity Yes, meets criteria 9/3/2019  8:00 PM   Number of days:         Assessment:   ASA SCORE: 3      Smoking Status:  Non-Smoker/Unknown        Plan:   Anes. Type:  General   Pre-Medication: None   Induction:  IV (Standard)   Airway: ETT; Oral   Access/Monitoring: PIV   Maintenance: Balanced     Postop Plan:   Postop Pain: Opioids  Postop Sedation/Airway: Not planned     PONV Management:   Adult Risk Factors: Female, Non-Smoker, Postop Opioids   Prevention: Ondansetron, Dopamine Antagonist                   Gallo Turcios MD

## 2019-09-05 NOTE — PLAN OF CARE
"Status: Progressive severe back pain, L1 compression fracture, Metastic lesion in skull, thoracic and lumbar spine. Post-bronchoscopy with biopsy this afternoon  Precautions: standard, fall  VS: hypertensive 150's prn hydralazine parameters >160 >110  Neuro: intact, calling appropriately   Resp: WNL on room air  GI: voiding spontaneously   : pink lady enema with large result   IV: port remains de-accessed to reassess when returns from procedure, PIV  Skin: continue to monitor erythema at port site   Pain: patient reported control prior to 3C had declined pain medications includuing scheduled MS contin along with other PO medications \"my nausea is finally under control I don't want to mess with it\"  Activity: TLSO brace when OOB or HOB >90 degrees, up SBA with walker   Plan of Care: Radiation to start tomorrow d/t time conflict with bronchoscopy     "

## 2019-09-05 NOTE — ANESTHESIA CARE TRANSFER NOTE
Patient: Shaneka Alvarez    Procedure(s):  Flexible Bronchoscopy, Endobronchial Ultrasound, Radial Probe  Transbronchial Biopsies    Diagnosis: Lung Mass  Diagnosis Additional Information: No value filed.    Anesthesia Type:   General     Note:  Airway :Face Mask  Patient transferred to:PACU  Comments: To PACU, VSS, pt awake and alert, exchanging well, report to RN, care accepted.Handoff Report: Identifed the Patient, Identified the Reponsible Provider, Reviewed the pertinent medical history, Discussed the surgical course, Reviewed Intra-OP anesthesia mangement and issues during anesthesia, Set expectations for post-procedure period and Allowed opportunity for questions and acknowledgement of understanding      Vitals: (Last set prior to Anesthesia Care Transfer)    CRNA VITALS  9/5/2019 1629 - 9/5/2019 1703      9/5/2019             Resp Rate (observed):  1  (Abnormal)     Resp Rate (set):  10                Electronically Signed By: GUTIERREZ Rodriguez CRNA  September 5, 2019  5:03 PM

## 2019-09-05 NOTE — OR NURSING
Talked with dr Turcios and is ok with pt's BP where its at and to hold off on giving any hydralazine d/t parameters.

## 2019-09-05 NOTE — OR NURSING
Dr. Sanchez and Dr. Son Zendejas text paged to update patient's  Kash regarding procedure. Both Doctors called back and stated that the patient had stated the  was not around and she did not know when he would be here. Dr. Son Zendejas stated he would round on the patient in the morning and would update the patient on how the procedure went. Dr. Son Zendejas also stated that this RN could tell the patient that the procedure went as expected and the biopsy results would not be back for several days. This RN informed patient of the above. This RN attempted to call [facility] patient's  on cell phone and no one answered cell phone.

## 2019-09-05 NOTE — PROCEDURES
INTERVENTIONAL PULMONOLOGY       Procedure(s):    A flexible bronchoscopy  Airway exam  EBUS-TBNA (1 sites)  Radial EBUS Navigation (1 sites)  Transbronchial forcep biopsies (1 sites)    Procedure Date: 9/5/2019    Indication:  New FDG avid LLL nodular opacities with history of stage IV breast cancer    Attending of Record:  Erickson Sanchez MD    Interventional Pulmonary Fellow   Son Zendejas MD     Trainees Present:   None    Medications:    General Anesthesia - See anesthesia flowsheet for details    Sedation Time:   Per Anesthesia Care Provider    Time Out:  Performed    The patient's medical record has been reviewed.  The indication for the procedure was reviewed.  The necessary history and physical examination was performed and reviewed.  The risks, benefits and alternatives of the procedure were discussed with the the patient in detail and she had the opportunity to ask questions.  I discussed in particular the potential complications including risks of minor or life-threatening bleeding and/or infection, respiratory failure, vocal cord trauma / paralysis, pneumothorax, and discomfort. Sedation risks were also discussed including abnormal heart rhythms, low blood pressure, and respiratory failure. All questions were answered to the best of my ability.  Verbal and written informed consent was obtained.  The proposed procedure and the patient's identification were verified prior to the procedure by the physician and the nurse.    The patient was assessed for the adequacy for the procedure and to receive medications.   Mental Status:  Alert and oriented x 3  Airway examination:  Class II (Complete visualization of uvula)  Pulmonary:  Clear to ausculation bilaterally  CV:  RRR, no murmurs or gallops  ASA Grade:  (III)  Severe systemic disease    After clinical evaluation and reviewing the indication, risks, alternatives and benefits of the procedure the patient was deemed to be in satisfactory condition to  undergo the procedure.      A Tuberculosis risk assessment was performed:  The patient has no known RISK of Tuberculosis    The procedure was performed in a negative airflow room: The patient could not be moved to a negative airflow room because of needed OR for the procedure    Maneuvers / Procedure:      A Flexible  bronchoscope was used for the procedure. The patient was orally intubated with a # 8.5 ETT and the flexible scope was passed through.      Airway Examination: A complete airway examination was performed from the distal trachea to the subsegmental level in each lobe of both lungs.  Pertinent findings include normal anatomy and no evidence of endobronchial abnormality, specifically no evidence of endobronchial tumor in the basilar segments of the left lower lobe.         EBUS-TBNA: The EBUS scope was inserted and biopsies were obtained from station 7 with 4 passes, 4 samples obtained with HAL present. Lymphocytes were identified. EBUS samples were sent for cytology.  Of note, all mediastinal, hilar, lobar and segmental stations are evaluated during the procedure and those not listed were not biopsied due to small lymph node diameter, positive HAL on higher riu staging, alternative diagnosis or inability to continue with the procedure. Specifically, stations 11R, 4R, 4L and 11L were all </ 5 mm.     Radial EBUS navigation with transbronchial biopsies: The 1.8mm 20MHz radial probe was inserted into the bronchoscope and used to navigate to the lesion in the lateral basilar (LB9) as well as posterior basilar (LB10) segments of the left lower lobe. In these segments, the lesion was noted to be eccentric  by ultrasound. Fluoroscopy was not. Epinephrine was not used. Tissue sampling was obtained using biopsy forceps. AHL was present and atypical cells were identified concerning for malignancy. 8 biopsy specimens were submitted for path review.    Any disposable equipment was visually inspected and deemed  to be intact immediately post procedure.      Relevant Pictures      Recommendations:       Successful EBUS TBNA of station 7 (sub-dio)    Successful radial EBUS guided transbronchial biopsies of LLL nodule (LB9/LB10)    Atypical cells identified from the LLL biopsies    Await pending cytology and pathology reviews from the aforementioned biopsies     Procedure results conveyed to the Oncology fellow caring for Mrs. Alvarez        Son BESS Zendejas MD, 9/5/2019, 5:01 PM  Interventional Pulmonology Fellow  Department of Pulmonary, Allergy, Critical Care & Sleep Medicine   Pager: [phone]

## 2019-09-05 NOTE — PROGRESS NOTES
[facility], [place]    Hematology / Oncology Progress Note    Date of Service (when I saw the patient): 09/05/2019     Assessment & Plan   Shaneka Alvarez is a 59 year old female with history of multiple sclerosis which is stable, T2 N1 M0 invasive lobular carcinoma of the right breast that was ER+/TX+ HER-2 negative s/p bilateral mastectomies, admitted after recent findings of 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 T/L spine MRI 8/31/19 with numerous enhancing lesions of the T and L spine, largest around T9 and L1. No evidence of cord compression. Has a L1 compression fracture and impending L4. MRI C spine 9/2/19 also with numerous enhancing lesions.   - Rad Onc consulted--> Planning 10 fractions of XRT to L spine +/- C spine. Pt agreeable to completing XRT at [facility] and staying locally. Simulation scan completed 9/3. Rad onc tentatively planning to start XRT 9/5 pending prelim path.  - NSG consulted--> no surgical intervention, wear Henderson brace when out of bed and HOB >30 degrees  - Continue Dex 4 mg BID  - MS Contin 15 mg BID (x9/2). Continue IV Morphine, PO Oxycodone 10-20 q3hrs prn.  - Miralax BID, Senna BID, Bisacodyl suppository and Lactulose prn, Pink lady enema 9/5.     #Recurrent breast cancer vs. New primary  History of ER+TX+[id number] T2 N1 M0 breast cancer s/p chemo and radiation in [id number], s/p R mod radical mastectomy and L prophylactic mastectomy in 2016.  Finished 7 years of  exemastone until 2014.   - PET scan 8/30/19 with numerous bone lesions (calvarium, spine, sacrum, pelvis, ribs most prominent at C7, T3, L1 and L4), hypermetabolic 3 cm lesion in LLL, and mediastinal/hilar LN. Concern for metastatic breast vs lung cancer.  - CT guided biopsy of right iliac lesion done 9/4, pathology pending  - Pulmonary consulted and will perform EBUS 9/5.  - CEA wnl at 1.9 and C27-29 elevated to 415 (28 on 8/23/16)  - Palliative care consulted, recs much appreciated.  - Has appointment with Dr. Zepeda on 9/18/19     #Subacute productive cough without fevers, chills, night sweats  #Left lung nodule with GGO  Not coughing at present time but will order sputum cultures in case she brings up anything. In absence of any additional symptoms, signs, or labs concerning for infection will not do a full infection workup as of yet. Sputum culture & gram stain ordered. Procal is negative.    #HTN  Possibly exacerbated by pain and nausea. Is on Propranolol PTA.   - Discussed and will hold additional antihypertensives  - PRN Hydralazine      --- CHRONIC ISSUES ---  #MS with right UE and LE weakness  #Migraine  In particular weak in right lower leg with weakness of hip flexion, ankle dorsiflexion, and toe dorsiflexion without actual evidence of foot drop. Recent Brain imaging without evidence of new MS lesions.  - Continue Propranolol, magnesium held for now  - Not on any controller med for MS.   #Overactive bladder: Continue tolterodine  #Depression: Continue venlafaxine, buspar     FEN: regular diet, NPO after midnight.  Prophylaxis: Lovenox 40 mg every day (hold for procedure), Ranitidine 150 mg BID  Code: FULL  Disposition: 2-3 days based on pain control, radiation and biopsy coordination.     The patient's care was discussed with the Attending Physician, Dr. Arshad.    [facility] PA-C  Hematology/Oncology  Pager: [phone]    Addendum:  Pt was seen and evaluated by me independently of the  SHAYNE.  Reviewed labs, imaging and clinical course.  We reviewed patient's imaging with lung and bone findings.  I discussed that given her history of tobacco use along with the imaging, this could be a primary lung cancer.  Given her breast cancer history however, it also could be breast cancer.  Will plan on biopsy of both bone and lung. Await pathology.    Plan to begin XRT as soon as path results are available.       Continue MS Contin 15 mg bid.  Bowel regimen.      Arelis Rangel MD     Interval History   Very tired this morning. Had a lot of people coming into her room overnight. Had a small bowel movement overnight, but still feels constipated. Passing gas. Had a lot of nausea and vomiting yesterday. States that she has a history of this and it is not necessarily new. Has been afebrile.    Physical Exam   Temp: 97.3  F (36.3  C) Temp src: Oral BP: (!) 158/106 Pulse: 84 Heart Rate: 88 Resp: 14 SpO2: 96 % O2 Device: None (Room air) Oxygen Delivery: 3 LPM  Vitals:    08/30/19 1842 09/02/19 0800 09/03/19 0752   Weight: 74.6 kg (164 lb 6.4 oz) 74.5 kg (164 lb 3.2 oz) 74 kg (163 lb 2.3 oz)     Vital Signs with Ranges  Temp:  [95.5  F (35.3  C)-97.9  F (36.6  C)] 97.3  F (36.3  C)  Pulse:  [69-84] 84  Heart Rate:  [63-99] 88  Resp:  [0-27] 14  BP: (124-171)/() 158/106  SpO2:  [91 %-98 %] 96 %  I/O last 3 completed shifts:  In: -   Out: 975 [Urine:875; Emesis/NG output:100]    Constitutional: Pleasant female seen laying in bed. No apparent distress, and appears stated age.  Eyes: Lids and lashes normal, sclera clear, conjunctiva normal.  ENT: Normocephalic, oral cavity without erythema or exudates, gums normal and good dentition.   Respiratory: No increased work of breathing, good air exchange, clear to auscultation right, distant breath sounds left base, no crackles or wheezing.  Cardiovascular: Regular rate and rhythm, normal S1 and S2, and no murmur noted.  GI: No masses or scars. +BS. Soft. No tenderness on  palpation.  Skin: Limited bruising, no bleeding, no redness, no warmth, no rashes, no lesions, no jaundice.  Extremities: There is no redness, warmth, or swelling of the joints. No lower extremity edema. No cyanosis.  Neurologic: Gorge brace on. Awake, alert, oriented to name, place and time.    Vascular access: PIV    Medications     no pre procedure antibiotic needed         acetaminophen  1,000 mg Oral TID     busPIRone  15 mg Oral BID     dexamethasone  4 mg Intravenous Q12H     diclofenac  4 g Transdermal 4x Daily     heparin  5 mL Intracatheter Q28 Days     heparin lock flush  5-10 mL Intracatheter Q24H     ketamine 5% gabapentin 8% lidocaine 2.5%   Topical 4x Daily     lidocaine  1 patch Transdermal Q24H     lidocaine   Transdermal Q8H     lidocaine   Transdermal Q24h     morphine  15 mg Oral Q12H TIP     pink lady enema  186 mL Rectal Once     polyethylene glycol  17 g Oral BID     propranolol ER  60 mg Oral Daily     ranitidine  150 mg Oral BID     senna-docusate  1 tablet Oral BID    Or     senna-docusate  2 tablet Oral BID     sodium chloride (PF)  3 mL Intracatheter Q8H     tolterodine ER  4 mg Oral Daily     venlafaxine  225 mg Oral Daily       Data   ROUTINE IP LABS (Last four results)  BMP  Recent Labs   Lab 09/04/19  1501 09/01/19  0605 08/30/19 2015    135 134   POTASSIUM 3.6 4.0 3.6   CHLORIDE 100 106 100   RAFAELA 9.0 8.3* 8.8   CO2 28 22 26   BUN 11 14 9   CR 0.82 0.72 0.79   GLC 89 91 92     CBC  Recent Labs   Lab 09/04/19  1501 09/01/19  0605 08/30/19 2015   WBC 9.1 6.0 4.6   RBC 5.46* 4.79 4.94   HGB 15.7 13.4 14.0   HCT 47.8* 43.4 43.0   MCV 88 91 87   MCH 28.8 28.0 28.3   MCHC 32.8 30.9* 32.6   RDW 13.1 12.5 12.7    207 202     INR  Recent Labs   Lab 09/04/19  1501 08/30/19 2015   INR 1.04 1.09

## 2019-09-06 ENCOUNTER — DOCUMENTATION ONLY (OUTPATIENT)
Dept: PHARMACY | Facility: CLINIC | Age: 59
End: 2019-09-06

## 2019-09-06 ENCOUNTER — APPOINTMENT (OUTPATIENT)
Dept: RADIATION ONCOLOGY | Facility: CLINIC | Age: 59
End: 2019-09-06
Attending: RADIOLOGY
Payer: COMMERCIAL

## 2019-09-06 VITALS
SYSTOLIC BLOOD PRESSURE: 155 MMHG | HEART RATE: 78 BPM | OXYGEN SATURATION: 96 % | DIASTOLIC BLOOD PRESSURE: 106 MMHG | BODY MASS INDEX: 30.02 KG/M2 | HEIGHT: 62 IN | RESPIRATION RATE: 18 BRPM | WEIGHT: 163.14 LBS | TEMPERATURE: 96.2 F

## 2019-09-06 LAB
FLUAV H1 2009 PAND RNA SPEC QL NAA+PROBE: NEGATIVE
FLUAV H1 RNA SPEC QL NAA+PROBE: NEGATIVE
FLUAV H3 RNA SPEC QL NAA+PROBE: NEGATIVE
FLUAV RNA SPEC QL NAA+PROBE: NEGATIVE
FLUBV RNA SPEC QL NAA+PROBE: NEGATIVE
GLUCOSE BLDC GLUCOMTR-MCNC: 89 MG/DL (ref 70–99)
HADV DNA SPEC QL NAA+PROBE: NEGATIVE
HADV DNA SPEC QL NAA+PROBE: NEGATIVE
HMPV RNA SPEC QL NAA+PROBE: NEGATIVE
HPIV1 RNA SPEC QL NAA+PROBE: NEGATIVE
HPIV2 RNA SPEC QL NAA+PROBE: NEGATIVE
HPIV3 RNA SPEC QL NAA+PROBE: NEGATIVE
MICROBIOLOGIST REVIEW: NORMAL
RHINOVIRUS RNA SPEC QL NAA+PROBE: NEGATIVE
RSV RNA SPEC QL NAA+PROBE: NEGATIVE
RSV RNA SPEC QL NAA+PROBE: NEGATIVE
SPECIMEN SOURCE: NORMAL

## 2019-09-06 PROCEDURE — 99233 SBSQ HOSP IP/OBS HIGH 50: CPT | Performed by: INTERNAL MEDICINE

## 2019-09-06 PROCEDURE — 77280 THER RAD SIMULAJ FIELD SMPL: CPT | Performed by: RADIOLOGY

## 2019-09-06 PROCEDURE — 77412 RADIATION TX DELIVERY LVL 3: CPT | Performed by: RADIOLOGY

## 2019-09-06 PROCEDURE — 25000131 ZZH RX MED GY IP 250 OP 636 PS 637: Performed by: PHYSICIAN ASSISTANT

## 2019-09-06 PROCEDURE — 25000132 ZZH RX MED GY IP 250 OP 250 PS 637: Performed by: STUDENT IN AN ORGANIZED HEALTH CARE EDUCATION/TRAINING PROGRAM

## 2019-09-06 PROCEDURE — 99239 HOSP IP/OBS DSCHRG MGMT >30: CPT | Performed by: INTERNAL MEDICINE

## 2019-09-06 PROCEDURE — 25000132 ZZH RX MED GY IP 250 OP 250 PS 637: Performed by: INTERNAL MEDICINE

## 2019-09-06 PROCEDURE — 25000132 ZZH RX MED GY IP 250 OP 250 PS 637: Performed by: PHYSICIAN ASSISTANT

## 2019-09-06 PROCEDURE — 25000128 H RX IP 250 OP 636: Performed by: INTERNAL MEDICINE

## 2019-09-06 RX ORDER — ONDANSETRON 8 MG/1
8 TABLET, ORALLY DISINTEGRATING ORAL EVERY 8 HOURS
Status: DISCONTINUED | OUTPATIENT
Start: 2019-09-06 | End: 2019-09-06 | Stop reason: HOSPADM

## 2019-09-06 RX ORDER — DEXAMETHASONE 4 MG/1
4 TABLET ORAL EVERY 12 HOURS SCHEDULED
Status: DISCONTINUED | OUTPATIENT
Start: 2019-09-06 | End: 2019-09-06

## 2019-09-06 RX ORDER — LORAZEPAM 0.5 MG/1
0.5 TABLET ORAL EVERY 6 HOURS PRN
Status: DISCONTINUED | OUTPATIENT
Start: 2019-09-06 | End: 2019-09-06 | Stop reason: HOSPADM

## 2019-09-06 RX ORDER — DEXAMETHASONE 4 MG/1
4 TABLET ORAL EVERY 12 HOURS
Qty: 30 TABLET | Refills: 0 | Status: SHIPPED | OUTPATIENT
Start: 2019-09-06 | End: 2019-12-04

## 2019-09-06 RX ORDER — ACETAMINOPHEN 500 MG
1000 TABLET ORAL 3 TIMES DAILY
Qty: 180 TABLET | Refills: 0 | Status: SHIPPED | OUTPATIENT
Start: 2019-09-06 | End: 2023-01-01

## 2019-09-06 RX ORDER — AMOXICILLIN 250 MG
1-2 CAPSULE ORAL 2 TIMES DAILY PRN
COMMUNITY
Start: 2019-09-06 | End: 2022-11-18

## 2019-09-06 RX ORDER — OXYCODONE HYDROCHLORIDE 10 MG/1
10-20 TABLET ORAL EVERY 4 HOURS PRN
Qty: 100 TABLET | Refills: 0 | Status: SHIPPED | OUTPATIENT
Start: 2019-09-06 | End: 2019-09-06

## 2019-09-06 RX ORDER — DEXAMETHASONE 4 MG/1
4 TABLET ORAL DAILY
Status: DISCONTINUED | OUTPATIENT
Start: 2019-09-06 | End: 2019-09-06

## 2019-09-06 RX ORDER — PROCHLORPERAZINE MALEATE 10 MG
10 TABLET ORAL EVERY 6 HOURS PRN
Qty: 45 TABLET | Refills: 0 | Status: SHIPPED | OUTPATIENT
Start: 2019-09-06 | End: 2019-10-07

## 2019-09-06 RX ORDER — LIDOCAINE 4 G/G
1 PATCH TOPICAL EVERY 24 HOURS
Qty: 30 PATCH | Refills: 0 | Status: SHIPPED | OUTPATIENT
Start: 2019-09-06 | End: 2020-01-14

## 2019-09-06 RX ORDER — PROCHLORPERAZINE MALEATE 10 MG
10 TABLET ORAL EVERY 6 HOURS
Status: DISCONTINUED | OUTPATIENT
Start: 2019-09-06 | End: 2019-09-06 | Stop reason: HOSPADM

## 2019-09-06 RX ORDER — OXYCODONE HYDROCHLORIDE 5 MG/1
5-10 TABLET ORAL EVERY 4 HOURS PRN
Qty: 100 TABLET | Refills: 0 | Status: SHIPPED | OUTPATIENT
Start: 2019-09-06 | End: 2019-10-07

## 2019-09-06 RX ORDER — ONDANSETRON 8 MG/1
8 TABLET, ORALLY DISINTEGRATING ORAL EVERY 8 HOURS PRN
Qty: 45 TABLET | Refills: 0 | Status: SHIPPED | OUTPATIENT
Start: 2019-09-06 | End: 2020-04-28

## 2019-09-06 RX ORDER — DEXAMETHASONE 4 MG/1
4 TABLET ORAL EVERY 12 HOURS SCHEDULED
Status: DISCONTINUED | OUTPATIENT
Start: 2019-09-06 | End: 2019-09-06 | Stop reason: HOSPADM

## 2019-09-06 RX ORDER — POLYETHYLENE GLYCOL 3350 17 G/17G
17 POWDER, FOR SOLUTION ORAL 2 TIMES DAILY PRN
COMMUNITY
Start: 2019-09-06 | End: 2021-02-05

## 2019-09-06 RX ORDER — LORAZEPAM 0.5 MG/1
0.5 TABLET ORAL EVERY 6 HOURS PRN
Qty: 30 TABLET | Refills: 0 | Status: SHIPPED | OUTPATIENT
Start: 2019-09-06 | End: 2019-10-07

## 2019-09-06 RX ADMIN — ONDANSETRON 8 MG: 8 TABLET, ORALLY DISINTEGRATING ORAL at 09:40

## 2019-09-06 RX ADMIN — DICLOFENAC 4 G: 10 GEL TOPICAL at 09:49

## 2019-09-06 RX ADMIN — BUSPIRONE HYDROCHLORIDE 15 MG: 15 TABLET ORAL at 10:36

## 2019-09-06 RX ADMIN — PROCHLORPERAZINE MALEATE 10 MG: 10 TABLET ORAL at 11:23

## 2019-09-06 RX ADMIN — DEXAMETHASONE 4 MG: 4 TABLET ORAL at 11:24

## 2019-09-06 RX ADMIN — PROPRANOLOL HYDROCHLORIDE 60 MG: 60 CAPSULE, EXTENDED RELEASE ORAL at 10:35

## 2019-09-06 RX ADMIN — Medication: at 09:49

## 2019-09-06 RX ADMIN — LIDOCAINE 1 PATCH: 560 PATCH PERCUTANEOUS; TOPICAL; TRANSDERMAL at 09:48

## 2019-09-06 RX ADMIN — HYDRALAZINE HYDROCHLORIDE: 20 INJECTION INTRAMUSCULAR; INTRAVENOUS at 01:06

## 2019-09-06 RX ADMIN — RANITIDINE 150 MG: 150 TABLET ORAL at 10:36

## 2019-09-06 RX ADMIN — MORPHINE SULFATE 2 MG: 2 INJECTION, SOLUTION INTRAMUSCULAR; INTRAVENOUS at 05:50

## 2019-09-06 RX ADMIN — TOLTERODINE 4 MG: 4 CAPSULE, EXTENDED RELEASE ORAL at 10:36

## 2019-09-06 RX ADMIN — VENLAFAXINE HYDROCHLORIDE 225 MG: 75 CAPSULE, EXTENDED RELEASE ORAL at 10:35

## 2019-09-06 RX ADMIN — ACETAMINOPHEN 1000 MG: 500 TABLET, FILM COATED ORAL at 10:36

## 2019-09-06 RX ADMIN — LORAZEPAM 0.5 MG: 2 INJECTION INTRAMUSCULAR; INTRAVENOUS at 04:35

## 2019-09-06 RX ADMIN — PROCHLORPERAZINE EDISYLATE 10 MG: 5 INJECTION INTRAMUSCULAR; INTRAVENOUS at 01:30

## 2019-09-06 ASSESSMENT — ACTIVITIES OF DAILY LIVING (ADL)
ADLS_ACUITY_SCORE: 17

## 2019-09-06 ASSESSMENT — PAIN DESCRIPTION - DESCRIPTORS: DESCRIPTORS: ACHING

## 2019-09-06 NOTE — PLAN OF CARE
00:00-07:00  Tmax 99.0  Elevated BP continues,  167/114  Hydralazine given x 1 with rechecked, 120/66  OVSS  Denied SOB or chest pain s/p EBUS yesterday  No respiratory issues  Intermittent nausea continues and emesis x 2   Compazine and Ativan given with relief in between  Morphine 2 mg for back pain  Pt seems reluctant to take any medications as fear of escalating nausea but yet appears in pain.  Reassured pt giving antiemetics prior po medications  Up with assist of 1 using walker and wearing TLSO.  Voiding well, good UOP  Moderate amt of loose formed stool x 1  Resting/sleeping between cares  Plan to start radiation today  Continue w/POC

## 2019-09-06 NOTE — DISCHARGE SUMMARY
[facility], [place]    Discharge Summary  Hematology / Oncology    Date of Admission:  8/30/2019  Date of Discharge:  9/6/2019  Discharging Provider: Jeannie Yañez  Date of Service (when I saw the patient): 09/06/19    Discharge Diagnoses   Low back pain with sciatica  Pathological compression fracture of vertebra  Carcinoma of breast metastatic to bone, unspecified laterality    History of Present Illness   Shaneka Alvarez is a 59 year old female with history of multiple sclerosis which is stable, T2 N1 M0 invasive lobular carcinoma of the right breast that was ER+/MO+ HER-2 negative s/p bilateral mastectomies, admitted after recent findings of metastatic lesions to the skull and vertebrae along with progressive back pain.     Please see H&P for further detail of history.    Hospital Course   Shaneka Alvarez was admitted on 8/30/2019.  The following problems were addressed during her hospitalization:    Shaneka Alvarez is a 59 year old female with history of multiple sclerosis and breast cancer who was admitted after findings of metastatic lesions to the skull and vertebrae along with progressive back pain. C/T/L spine MRIs revealed numerous enhancing lesions along with L1 compression fracture and impending L4 fracture. Rad onc and neurosurgery were consulted with no surgical recommendation and ultimately arranged for 10 fractions of XRT to the L spine +/- C spine. PET scan 8/30/19 with numerous bone lesions (calvarium, spine, sacrum, pelvis, ribs), hypermetabolic 3 cm lesion in LLL, and mediastinal/hilar LN. A CT guided biopsy of the right iliac bone was obtained on 9/4/19, pathology consistent with metastatic adenocarcinoma (breast), ER/MO negative. A second biopsy was take of the LLL nodule via EBUS on 9/5/19, pathology pending. Palliative care was consulted for assistance with symptom management of pain and nausea.  - PDL1 status added on, HER2 status pending  - Final pathology  from LLL biopsy pending  - Will continue XRT x 10 fractions (9/6-9/18/19)  - Follow-up with Dr. Zepeda on 9/18/19    New medications:  - Dexamethasone 4 mg BID  - Ranitidine 150 mg BID  - Oxycodone 10-20 mg q4hrs prn  - Lidocaine patches, Diclofenac gel  - Zofran, Compazine, Ativan    Detailed hospital stay below:     #Metastatic breast cancer to bone  Previously treated by Dr. Amaya. Diagnosed in 2006 with Stage IIB, T2 N1 M0 invasive lobular carcinoma of the right breast.  Final pathology showed a 4.5 x 3.8 x 2.5 cm, 01/14 lymph nodes positive.  Estrogen, progesterone receptor positive, HER-2 negative. Treatment history:  1. 2006: Received dose-dense Adriamycin, Cytoxan and Avastin x4 cycles followed by Taxol and Avastin x4 cycles.   2.  03/2007:  Completed 1 year Avastin.   3.  11/2006-01/2007:  Radiotherapy to the right breast of 5040 cGy.   4.  [id number]:  Aromasin.  Stopped after moving to the [facility].   5.  01/2016:  Right modified radical mastectomy with latissimus dorsi flap reconstruction and a left prophylactic mastectomy with latissimus dorsi flap reconstruction.     PET scan 8/30/19 with numerous bone lesions (calvarium, spine, sacrum, pelvis, ribs most prominent at C7, T3, L1 and L4), hypermetabolic 3 cm lesion in LLL, and mediastinal/hilar LN. Concern for metastatic breast vs lung cancer. CEA wnl at 1.9 and C27-29 elevated to 415 (28 on 8/23/16). A CT guided biopsy of the right iliac bone was obtained on 9/4/19, pathology consistent with metastatic adenocarcinoma (breast), ER/MA negative. A second biopsy was take of the LLL nodule via EBUS on 9/5/19, pathology pending. Palliative care was consulted for assistance with symptom management of pain and nausea.  - PDL1 status added on, HER2 status pending  - Final pathology from LLL biopsy pending  - Will continue XRT x 10 fractions (9/6-9/18/19)  - Follow-up with Dr. Zepeda on 9/18/19    #Progressive severe back pain   #L1 compression  fracture  #Metastic lesion in skull, thoracic and lumbar spine  MRI brain 8/21/19 with multiple enhancing calvarial lesions (a similar single lesion in C2 vertebral body), new since 7/10/2018 and consistent metastatic disease. T/L spine MRI 8/31/19 with numerous enhancing lesions of the T and L spine, largest around T9 and L1. No evidence of cord compression. Has a L1 compression fracture and impending L4. MRI C spine 9/2/19 also with numerous enhancing lesions.   - Rad Onc consulted and planning 10 fractions of XRT to L spine +/- C spine from 9/6-9/18/19.  - NSG consulted and no surgical intervention, wear [place] brace when out of bed and HOB >30 degrees  - Continue Dex 4 mg BID  - Pain control with PO Oxycodone 10-20 q4hrs prn, diclofenac gel and lidocaine patches  - Bowel regimen prescribed     #HTN  Possibly exacerbated by pain and nausea. Is on Propranolol PTA. Discussed and will hold additional antihypertensives during admission and follow-up outpatient.      --- CHRONIC ISSUES ---  #MS with right UE and LE weakness  #Migraine  Recent brain imaging without evidence of new MS lesions.  - Continue Propranolol, magnesium  #Overactive bladder: Continue tolterodine  #Depression: Continue venlafaxine, buspar     Dispo: Discharged to home on 9/6. Offered [facility], but prefers home.     The patient's care was discussed with the Attending Physician, Dr. Claire.     Jeannie ([facility] [place]) YOLY Bucio  Hematology/Oncology  Pager: [phone]    Significant Results and Procedures   Results for orders placed or performed during the hospital encounter of 08/30/19   MR Thoracic Spine w/o & w Contrast     Value    Radiologist flags (Urgent)     Probable pathologic fracture at L1 with mild spinal    Narrative    Thoracic and Lumbar spine MRI without and with contrast    History: Possible mets to spine.  ICD-10:  Comparison: PET/CT dated 8/30/2019, MR studies of the spine dated  8/29/2015 and 11/21/2014.    Technique:  Axial  T2-weighted, and sagittal T1, STIR, and T2-weighted  images of the lumbar spine without intravenous contrast. Sagittal T1,  STIR, and T2-weighted images of the thoracic spine without contrast  were obtained, with axial T2-weighted images through levels of  interest in the thoracic spine. Postcontrast fat-suppressed images of  the thoracic and lumbar spine in multiple planes were obtained.  Contrast: 7.5mL Gadavist    Findings:  Thoracic Spine:    Images moderately degraded by motion artifact. There are numerous  enhancing lesions involving both the anterior and posterior elements  of the thoracic spine. The largest lesion is in the right lamina and  transverse process at T9 and measures approximately 1.3 x 1.9 x 1.3  cm.There is mild mass effect on the thecal sac but no spinal canal  stenosis and no evidence for cord compression at this level (series 9  image 39 and series 8 image 6). There is no abnormal T2 signal within  the cord. No abnormal enhancement within the thecal sac or cord.    Patchy area of hyperintensity and enhancement in the left lower lung  which may represent atelectasis versus infection.    Lumbar Spine:  There are 5 type lumbar vertebra used for the purposes of this  discussion. There are numerous enhancing lesions involving both the  anterior and posterior elements of the lumbar spine involving  essentially all levels. There is diffuse involvement of the vertebral  body at L1 with a likely pathologic compression deformity and  approximately 60% loss of vertebral body height centrally. Mild  epidural extension on the left at L1 without significant spinal canal  stenosis and no evidence for cord compression. There are also  enhancing lesions in the bilateral marin and sacrum.    The tip of the conus is at L2. No enhancing lesion within the cord,  cauda equina, or thecal sac.. There is no abnormal T2 signal seen in  the visualized cord.    Multilevel lumbar spondylosis. There is mild retropulsion  of the  superior endplate of L4 adjacent to an intravertebral disc herniation  causing mild spinal canal stenosis. Otherwise no significant spinal  canal stenosis or neural foraminal narrowing in the lumbar spine.    No acute intra-abdominal findings appreciated on this exam.      Impression    Impression:   1. Numerous enhancing lesions of the thoracic spine involving both the  anterior and posterior elements, which likely represent metastatic  disease. The largest lesion involves the posterior elements of T9.  There is no significant spinal canal stenosis and no evidence for cord  compression.  2. Numerous enhancing lesions of the lumbar and sacral spine involving  the anterior and posterior elements, which likely represent metastatic  disease. The largest lesion involving the majority of the vertebral  body of L1. Enhancing lesions in the bilateral marin.  3. Likely pathologic compression fracture of L1 with mild epidural  extension but no spinal canal stenosis and no evidence for cord  compression.  4. Patchy area of high signal intensity and enhancement in the left  lower lobe which may represent atelectasis or infection.    The on-call resident was concerned for cord compression, but there is  no evidence for significant spinal canal stenosis and no evidence for  cord compression after staff evaluation.    The following is the preliminary report from the on call resident  which is contradicted by the staff impression above.  [Urgent Result: Probable pathologic fracture at L1 with mild spinal  cord compression. Enhancing lesion in the posterior elements at T9  protruding into the spinal canal with mild spinal cord compression.  Multiple metastatic lesions of the thoracolumbar spine.    Finding was identified on 8/31/2019 2:13 AM.     Jake Pino MD was contacted by Dr. Isrrael Francois DO (Radiology [facility])  at 8/31/2019 2:59 AM and verbalized understanding of the urgent  finding.       I have personally reviewed  the examination and initial interpretation  and I agree with the findings.    SHERI NICOLE MD   MR Lumbar Spine w/o & w Contrast     Value    Radiologist flags (Urgent)     Probable pathologic fracture at L1 with mild spinal    Narrative    Thoracic and Lumbar spine MRI without and with contrast    History: Possible mets to spine.  ICD-10:  Comparison: PET/CT dated 8/30/2019, MR studies of the spine dated  8/29/2015 and 11/21/2014.    Technique:  Axial T2-weighted, and sagittal T1, STIR, and T2-weighted  images of the lumbar spine without intravenous contrast. Sagittal T1,  STIR, and T2-weighted images of the thoracic spine without contrast  were obtained, with axial T2-weighted images through levels of  interest in the thoracic spine. Postcontrast fat-suppressed images of  the thoracic and lumbar spine in multiple planes were obtained.  Contrast: 7.5mL Gadavist    Findings:  Thoracic Spine:    Images moderately degraded by motion artifact. There are numerous  enhancing lesions involving both the anterior and posterior elements  of the thoracic spine. The largest lesion is in the right lamina and  transverse process at T9 and measures approximately 1.3 x 1.9 x 1.3  cm.There is mild mass effect on the thecal sac but no spinal canal  stenosis and no evidence for cord compression at this level (series 9  image 39 and series 8 image 6). There is no abnormal T2 signal within  the cord. No abnormal enhancement within the thecal sac or cord.    Patchy area of hyperintensity and enhancement in the left lower lung  which may represent atelectasis versus infection.    Lumbar Spine:  There are 5 type lumbar vertebra used for the purposes of this  discussion. There are numerous enhancing lesions involving both the  anterior and posterior elements of the lumbar spine involving  essentially all levels. There is diffuse involvement of the vertebral  body at L1 with a likely pathologic compression deformity and  approximately 60%  loss of vertebral body height centrally. Mild  epidural extension on the left at L1 without significant spinal canal  stenosis and no evidence for cord compression. There are also  enhancing lesions in the bilateral marin and sacrum.    The tip of the conus is at L2. No enhancing lesion within the cord,  cauda equina, or thecal sac.. There is no abnormal T2 signal seen in  the visualized cord.    Multilevel lumbar spondylosis. There is mild retropulsion of the  superior endplate of L4 adjacent to an intravertebral disc herniation  causing mild spinal canal stenosis. Otherwise no significant spinal  canal stenosis or neural foraminal narrowing in the lumbar spine.    No acute intra-abdominal findings appreciated on this exam.      Impression    Impression:   1. Numerous enhancing lesions of the thoracic spine involving both the  anterior and posterior elements, which likely represent metastatic  disease. The largest lesion involves the posterior elements of T9.  There is no significant spinal canal stenosis and no evidence for cord  compression.  2. Numerous enhancing lesions of the lumbar and sacral spine involving  the anterior and posterior elements, which likely represent metastatic  disease. The largest lesion involving the majority of the vertebral  body of L1. Enhancing lesions in the bilateral marin.  3. Likely pathologic compression fracture of L1 with mild epidural  extension but no spinal canal stenosis and no evidence for cord  compression.  4. Patchy area of high signal intensity and enhancement in the left  lower lobe which may represent atelectasis or infection.    The on-call resident was concerned for cord compression, but there is  no evidence for significant spinal canal stenosis and no evidence for  cord compression after staff evaluation.    The following is the preliminary report from the on call resident  which is contradicted by the staff impression above.  [Urgent Result: Probable pathologic  fracture at L1 with mild spinal  cord compression. Enhancing lesion in the posterior elements at T9  protruding into the spinal canal with mild spinal cord compression.  Multiple metastatic lesions of the thoracolumbar spine.    Finding was identified on 8/31/2019 2:13 AM.     Jake Pino MD was contacted by Dr. Isrrael Francois DO (Radiology [facility])  at 8/31/2019 2:59 AM and verbalized understanding of the urgent  finding.       I have personally reviewed the examination and initial interpretation  and I agree with the findings.    SHERI NICOLE MD   XR Spine Complete Scoliosis 2 Views    Narrative    Exam: XR SPINE COMPLETE SCOLIOSIS 2 VW, 9/1/2019 10:38 AM    Indication: breast cancer metastasis, evaluate alignment of L1  fracture    Comparison: CT dated 8/31/2019 and PET/CT of 8/30/2000    Findings:   Bony destruction of L1. The alignment however appears intact. No  significant degenerative change throughout the spine.     No coronal or sagittal imbalance. No significant curvature of the  spine.    Right subclavian Port-A-Cath tip in the high right atrium. Surgical  clips in the upper abdomen and right axilla. Low lung volumes with  bibasilar atelectasis. Nonobstructive bowel gas pattern.      Impression    Impression:   1. Obvious bony destruction of L1. Alignment appears intact.  2. No coronal or sagittal imbalance.  3. Additional metastatic bone lesions are better evaluated on recent  PET/CT of 8/30/2019.    CRISTIAN GIRALDO MD   MR Cervical Spine w/o & w Contrast    Narrative    MR CERVICAL SPINE W/O & W CONTRAST 9/2/2019 1:50 PM    Provided History: evaluate for metastasis. Per chart review history of  invasive lobular carcinoma of the right breast with metastatic lesions  to the skull and spine.    Comparison: Cervical spine 8/25/2015    Technique: Sagittal T1-weighted, sagittal T2-weighted, sagittal  diffusion weighted, axial T2-weighted, and axial T2* gradient echo  images of the cervical spine were obtained  without intravenous  contrast. Following intravenous administration of gadolinium, axial  and sagittal T1-weighted images with fat saturation were also  obtained.    Contrast: 7.5mL Gadavist    Findings:  The cervical vertebrae appear normally aligned. Mild straightening of  the cervical spine.  There is no disc height narrowing at any level.   There is normal signal within and normal contour of the cervical  spinal cord. There is a 0.6 x 0.6 cm enhancing focus in the left C2  vertebral body. There is a 0.6 x 0.7 cm right and 0.6 x 0.4 cm left  enhancing focus in the C4 through body. There is a 1.0 x 1.1 cm  enhancing focus in the central C7 vertebral body. There is an  incompletely visualized enhancing focus at the superoposterior aspect  of the T3 vertebral body. Additionally, there is a 0.6 x 0.4 cm  enhancing focus of the left lateral mass at C3. Ill-defined enhancing  focus the left lateral mass at C5. Ill-defined enhancing focus at the  right lateral mass at C6. No abnormal enhancement within the cervical  spinal cord. Multilevel mild disc bulges without significant spinal  canal stenosis or neural foraminal narrowing at any level.      Impression    Impression:   1. Numerous enhancing lesions in the cervical spine as above  concerning for metastatic disease. No enhancing foci within the spinal  cord or thecal sac.  2. Mild multilevel cervical spondylosis without significant spinal  canal stenosis or neural foraminal narrowing.    I have personally reviewed the examination and initial interpretation  and I agree with the findings.    SHERI NICOLE MD   CT Lumbar Spine w/o Contrast    Narrative    CT LUMBAR SPINE W/O CONTRAST 8/31/2019 2:11 PM.    History: evaluate L1 fracture.  ICD-10: Vertebral fracture    Comparison: Same day MR.    Technique: Using multidetector thin collimation helical acquisition  technique, axial, coronal and sagittal CT images through the lumbar  spine were obtained without intravenous  contrast.     Findings: There are 5 lumbar type vertebrae. Regarding alignment, the  lumbar spine alignment appears preserved. Disc space narrowing at  T12-L1. L1 vertebral body compression fracture with marked osseous  hypodensity. No retropulsion of bony fragments. Superior endplate  collapse of L4. Hypodense lesions in T11, T12, L2, L3, L4, and L5  vertebral bodies and posterior vertebral elements. There are also  numerous hypodense lesions in the sacrum and bilateral ala of the  pelvis. Findings on a level by level basis are as follows:    L1-L2: No significant neural foraminal or spinal canal narrowing.    L2-L3:  No significant neural foraminal or spinal canal narrowing.    L3-L4: No significant neural foraminal narrowing. Mild spinal canal  stenosis.    L4-L5: No significant neural foraminal or spinal canal narrowing.    L5-S1: No significant neural foraminal or spinal canal narrowing.    Nonobstructing stone in the inferior pole of the left kidney.  Cholecystectomy. Nonobstructive bowel gas pattern. Atherosclerotic  calcifications of the abdominal aorta. No suspicious soft tissue  lesions.      Impression    Impression:  1. L1 compression fracture and vertebral body hypodensity likely  representing a pathologic fracture. No spinal canal stenosis.  2. Hypodense lesions in the all vertebral bodies and posterior  elements, sacrum and bilateral ala consistent with metastatic disease.  3. Mild spinal canal narrowing at L3-L4.  4. Nonobstructing renal stone in the inferior pole of the left kidney.    I have personally reviewed the examination and initial interpretation  and I agree with the findings.    SHERI NICOLE MD   CT Bone Biopsy Deep    Narrative    Procedures: CT-guided biopsy of the right iliac bone lesion  9/4/2019 4:10 PM    Clinical indication: CT guided Lytic lesion biopsy, right iliac crest    Additional history: Patient has a history of breast cancer. Lucent  lesions in the pelvis.    Comparison  studies: PET CT 8/30/2019    Interventional radiologists: Dr. Bladimir SHOEMAKER (IR staff)    Fellow: Christopher Lin I, Dr. Campos Mcintyre, was present for the critical part of the  procedure and immediately available for the entire procedure.    Consent: verbal and written informed consent obtained prior to  procedure.    Procedure details: Patient placed in supine position. Right lower  quadrant prepped and draped in standard sterile fashion. Using CT  guidance, bonopty needle was advanced to the right iliac bone. Stylet  was replaced with a drilling stylet and access was made through the  cortex. Following this, 18 Gauge tenmo needle was advanced into the  lesion through the biopsy cannula after removal of the stylet. A total  of 10 cores were obtained in view of scanty return. Pathology  indicated that adequate specimen. Limited postprocedural scan was  performed after removal of the cannula. No immediate complications.  Sterile dressing was applied.      Medications: fentanyl 100 mcg IV, midazolam 2 mg IV, 1% lidocaine  (buffered with 8.4% sodium bicarbonate) for local anesthesia.     Monitoring: The patient was placed on continuous monitoring. Vital  signs and sedation monitored by nursing staff under my supervision.  The patient remained stable throughout the procedure.    Face to face sedation time: 40 minutes    Complications: None.      Impression    IMPRESSION:     CT-guided biopsy of the right iliac bone lesion, total of 10 passes  were obtained.    PLAN:    Dressing may be removed tomorrow, patient may shower tomorrow. Bed  rest as ordered.      I have personally reviewed the examination and initial interpretation  and I agree with the findings.    CAMPOS MCINTYRE   XR Abdomen Port 1 View    Narrative    EXAM: XR ABDOMEN PORT 1 VW  9/4/2019 11:45 PM      HISTORY: R/O SBO    COMPARISON: PET CT 8/30/2019    FINDINGS:    Air-filled, nondilated loops of small and large bowel. No pneumatosis.  No portal  venous gas. Visualized portions of the lung demonstrate no  focal airspace opacities. Surgical clips project over the lung bases,  right upper quadrant, and pelvis.      Impression    IMPRESSION: Nonobstructive bowel gas pattern.         I have personally reviewed the examination and initial interpretation  and I agree with the findings.    ZEINAB MCGREGOR, DO     Pending Results   These results will be followed up in clinic by Dr. Zepeda on 9/18/19  Unresulted Labs Ordered in the Past 30 Days of this Admission     Date and Time Order Name Status Description    9/6/2019 1103 HER 2 CIRO FISH In process     9/5/2019 1701 Fine needle aspiration In process     9/5/2019 1653 Surgical pathology exam In process     9/5/2019 1630 Cytology non gyn In process     9/5/2019 1630 Bronchial Culture Aerobic Bacterial In process     9/5/2019 1630 Nocardia culture In process     9/5/2019 1630 Fungus Culture, non-blood In process     9/5/2019 1630 AFB Stain Non Blood In process     9/5/2019 1630 AFB Culture Non Blood In process     9/5/2019 1630 Actinomyces rule out Preliminary           Code Status   Full Code    Primary Care Physician   Mohit Barcenas    Physical Exam   Temp: 96.2  F (35.7  C) Temp src: Oral BP: (!) 155/106 Pulse: 78 Heart Rate: 106 Resp: 18 SpO2: 96 % O2 Device: None (Room air) Oxygen Delivery: 2 LPM  Vitals:    08/30/19 1842 09/02/19 0800 09/03/19 0752   Weight: 74.6 kg (164 lb 6.4 oz) 74.5 kg (164 lb 3.2 oz) 74 kg (163 lb 2.3 oz)     Vital Signs with Ranges  Temp:  [96.2  F (35.7  C)-99  F (37.2  C)] 96.2  F (35.7  C)  Pulse:  [] 78  Heart Rate:  [] 106  Resp:  [14-20] 18  BP: (120-187)/() 155/106  SpO2:  [95 %-98 %] 96 %  I/O last 3 completed shifts:  In: 600 [I.V.:600]  Out: 1601 [Urine:1500; Emesis/NG output:100; Blood:1]    ---    Time Spent on this Encounter   I, Jeannie Yañez PA-C, personally saw the patient today and spent greater than 30 minutes discharging this patient.    Discharge  Disposition   Discharged to home  Condition at discharge: Stable    Consultations This Hospital Stay   NEUROSURGERY ADULT IP CONSULT  PHYSICAL THERAPY ADULT IP CONSULT  ORTHOSIS BRACE IP CONSULT  ORTHOSIS BRACE IP CONSULT  ORTHOSIS BRACE IP CONSULT  VASCULAR ACCESS CARE ADULT IP CONSULT  RADIATION ONCOLOGY IP CONSULT  INTERVENTIONAL RADIOLOGY ADULT/PEDS IP CONSULT  OCCUPATIONAL THERAPY ADULT IP CONSULT  NUTRITION SERVICES ADULT IP CONSULT  VASCULAR ACCESS CARE ADULT IP CONSULT  VASCULAR ACCESS CARE ADULT IP CONSULT  PULMONARY GENERAL ADULT IP CONSULT  PALLIATIVE CARE ADULT IP CONSULT  VASCULAR ACCESS CARE ADULT IP CONSULT  VASCULAR ACCESS CARE ADULT IP CONSULT    Discharge Orders      XR Spine Complete Scoliosis 2 Views     Reason for your hospital stay    You were admitted with worsening pain and unfortunately found to have metastatic, stage 4 breast cancer. You have many upcoming appointments with radiation and oncology. Your vitals and symptoms have stabilized and you are safe to discharge home.     Adult [facility] Follow-up and recommended labs and tests    - You have radiation appointments through 9/18/19.   - You will see Dr. Zepeda (oncologist) in [place] on 9/18/19 to determine a further plan.    Appointments on [place] and/or [facility] (with [facility] or [facility] provider or service). Call [phone] if you haven't heard regarding these appointments within 7 days of discharge.    Please see follow-up appointments below:     Activity    Your activity upon discharge: Regular activity as tolerated. You would wear the Kerens brace any time you are out of bed or head of the bed is greater than 30 degrees.     When to contact your care team    Call the [facility] 24-hour triage line at [phone] or [phone] for temp >100.4, uncontrolled nausea/vomiting/diarrhea/constipation, unrelieved pain, bleeding not relieved with pressure, dizziness, chest pain, shortness of breath, loss of  consciousness, and any new or concerning symptoms.     Call [phone] and ask for the care coordinator that works with your oncologist if you have questions about scans, appointments, hospital follow-up, or other concerns.     Discharge Instructions    - You have multiple new medications, so please look at your medication list closely.   - For pain, take Tylenol, diclofenac gel, lidocaine patches and as needed Oxycodone.  - For nausea, first take Zofran. If no improvement, take Compazine. Lastly, take Ativan if no relief from Zofran or Compazine.  - Dexamethasone has been prescribed for nausea and pain relief.   - You need to take laxatives if you have not had a bowel movement every 1-2 days. Senna and Miralax are both available over the counter.     Diet    Follow this diet upon discharge: Regular diet as tolerated. I encourage you to drink 8, 8oz glasses of water a day to stay adequately hydrated.     Discharge Medications   Current Discharge Medication List      START taking these medications    Details   acetaminophen (TYLENOL) 500 MG tablet Take 2 tablets (1,000 mg) by mouth 3 times daily  Qty: 180 tablet, Refills: 0    Associated Diagnoses: Carcinoma of breast metastatic to bone, unspecified laterality (H)      dexamethasone (DECADRON) 4 MG tablet Take 1 tablet (4 mg) by mouth every 12 hours  Qty: 30 tablet, Refills: 0    Associated Diagnoses: Carcinoma of breast metastatic to bone, unspecified laterality (H)      diclofenac (VOLTAREN) 1 % topical gel Place 4 g onto the skin 4 times daily . Apply to areas of pain.  Qty: 200 g, Refills: 0    Associated Diagnoses: Carcinoma of breast metastatic to bone, unspecified laterality (H)      Lidocaine (LIDOCARE) 4 % Patch Place 1 patch onto the skin every 24 hours Apply to area of pain. To prevent lidocaine toxicity, patient should be patch free for 12 hrs daily.  Qty: 30 patch, Refills: 0    Associated Diagnoses: Carcinoma of breast metastatic to bone, unspecified  laterality (H)      LORazepam (ATIVAN) 0.5 MG tablet Take 1 tablet (0.5 mg) by mouth every 6 hours as needed for anxiety or nausea . Take 3rd (after Zofran and Compazine) for nausea.  Qty: 30 tablet, Refills: 0    Associated Diagnoses: Carcinoma of breast metastatic to bone, unspecified laterality (H)      oxyCODONE IR (ROXICODONE) 10 MG tablet Take 1-2 tablets (10-20 mg) by mouth every 4 hours as needed for moderate to severe pain  Qty: 100 tablet, Refills: 0    Associated Diagnoses: Carcinoma of breast metastatic to bone, unspecified laterality (H)      polyethylene glycol (MIRALAX/GLYCOLAX) packet Take 17 g by mouth 2 times daily as needed for constipation . Goal is to have a bowel movement every 1-2 days.    Associated Diagnoses: Carcinoma of breast metastatic to bone, unspecified laterality (H)      prochlorperazine (COMPAZINE) 10 MG tablet Take 1 tablet (10 mg) by mouth every 6 hours as needed for nausea or vomiting . Take second (after Zofran).  Qty: 45 tablet, Refills: 0    Associated Diagnoses: Carcinoma of breast metastatic to bone, unspecified laterality (H)      ranitidine (ZANTAC) 150 MG tablet Take 1 tablet (150 mg) by mouth 2 times daily  Qty: 60 tablet, Refills: 0    Associated Diagnoses: Carcinoma of breast metastatic to bone, unspecified laterality (H)      senna-docusate (SENOKOT-S/PERICOLACE) 8.6-50 MG tablet Take 1-2 tablets by mouth 2 times daily as needed for constipation . Goal is to have a bowel movement every 1-2 days.    Associated Diagnoses: Carcinoma of breast metastatic to bone, unspecified laterality (H)         CONTINUE these medications which have CHANGED    Details   ondansetron (ZOFRAN-ODT) 8 MG ODT tab Take 1 tablet (8 mg) by mouth every 8 hours as needed for nausea . Take first (before Compazine and Ativan).  Qty: 45 tablet, Refills: 0    Associated Diagnoses: Neurogenic bladder         CONTINUE these medications which have NOT CHANGED    Details   busPIRone (BUSPAR) 15 MG tablet  "Take 1 tablet (15 mg) by mouth 2 times daily  Qty: 180 tablet, Refills: 1    Associated Diagnoses: Anxiety      calcium citrate-vitamin D (CITRACAL) 315-250 MG-UNIT TABS Take by mouth daily      Cholecalciferol (VITAMIN D3 PO) Take 2,000 Units by mouth daily      erenumab-aooe (AIMOVIG 140 DOSE) 70 MG/ML injection Inject 2 mLs (140 mg) Subcutaneous every 30 days  Qty: 2 mL, Refills: 6    Associated Diagnoses: Intractable migraine with aura with status migrainosus      magnesium 250 MG tablet Take 1 tablet by mouth daily      propranolol (INDERAL LA) 60 MG 24 hr capsule TAKE 1 CAPSULE(60 MG) BY MOUTH DAILY  Qty: 90 capsule, Refills: 1    Associated Diagnoses: Migraine without aura and without status migrainosus, not intractable      syringe/needle, disp, (B-D INTEGRA SYRINGE) 23G X 1\" 3 ML MISC 1 each as needed  Qty: 25 each, Refills: 0    Comments: Verbal order per Charito Torres for 25 syringes and needles called into [facility] tonight 5/11/2016 at 530p. Patient notified of refill tonight./   Samantha SaldanaRN  Patient Care Supervisor  [facility]  Office:[phone]  Associated Diagnoses: Migraine without aura and without status migrainosus, not intractable      tolterodine ER (DETROL LA) 4 MG 24 hr capsule Take 1 capsule (4 mg) by mouth daily  Qty: 90 capsule, Refills: 3    Associated Diagnoses: Multiple sclerosis (H)      venlafaxine (EFFEXOR-ER) 225 MG TB24 24 hr tablet TAKE 1 TABLET(225 MG) BY MOUTH DAILY WITH BREAKFAST  Qty: 15 tablet, Refills: 0    Associated Diagnoses: Anxiety         STOP taking these medications       ketorolac (TORADOL) 60 MG/2ML SOLN injection Comments:   Reason for Stopping:             Allergies   Allergies   Allergen Reactions     Bactrim [Sulfamethoxazole W/Trimethoprim]      Internal bleeding     Copaxone [Glatiramer] Hives     Data   ROUTINE IP LABS (Last four results)  BMP  Recent Labs   Lab 09/04/19  1501 09/01/19  0605 08/30/19 2015   NA " 136 135 134   POTASSIUM 3.6 4.0 3.6   CHLORIDE 100 106 100   RAFAELA 9.0 8.3* 8.8   CO2 28 22 26   BUN 11 14 9   CR 0.82 0.72 0.79   GLC 89 91 92     CBC  Recent Labs   Lab 09/04/19  1501 09/01/19  0605 08/30/19 2015   WBC 9.1 6.0 4.6   RBC 5.46* 4.79 4.94   HGB 15.7 13.4 14.0   HCT 47.8* 43.4 43.0   MCV 88 91 87   MCH 28.8 28.0 28.3   MCHC 32.8 30.9* 32.6   RDW 13.1 12.5 12.7    207 202     INR  Recent Labs   Lab 09/04/19  1501 08/30/19 2015   INR 1.04 1.09

## 2019-09-06 NOTE — PLAN OF CARE
Attempted to access port, but port seemed flipped. Missed with a single attempt. Pt stated she had been told in the past that the port was twisted. Also stated that is was going to be exchanged in the next month or so. Will continue to use PIV.

## 2019-09-06 NOTE — PROGRESS NOTES
Interventional Pulmonology          Follow-up Inpatient Progress Note                                       September 6, 2019              Shaneka Alvarez  Date of Service: 9/6/2019    Date of Admission: 8/30/2019      Assessment:   Shaneka Alvarez is a 59 year old woman admitted on 8/30/2019 with new, presumably metastatic bone lesions in the context of a history of T2N1M0 invasive lobular carcinoma of the right breast that was ER+/ME+ HER-2 negative . Interventional Pulmonology is consulted for biopsy of a newly identified PET-avid LLL ground glass opacity, and PET-avid hilar/mediastinal lymph nodes. She is now POD#1 from EBUS mediastinal biopsies and radial probe directed biopsies of the LLL, the latter of which at least was positive on HAL. Final cytology pending.    Plan:    Await pending cytology    No further recommendations from IP perspective, we will sign off but please call with questions           Interval History:   Some nausea overnight, but feeling well this AM without chest pain or dyspnea.                       Data:   All pertinent laboratory and imaging data reviewed.      No new imaging in the last 24 hours.         Review of Systems:   A 12 point review of systems is negative aside for as noted above         Physical Exam:   Temp:  [96.4  F (35.8  C)-99  F (37.2  C)] 97.4  F (36.3  C)  Pulse:  [] 90  Heart Rate:  [] 106  Resp:  [14-20] 18  BP: (120-187)/() 138/101  SpO2:  [95 %-98 %] 96 %    Intake/Output Summary (Last 24 hours) at 9/6/2019 0909  Last data filed at 9/6/2019 0128  Gross per 24 hour   Intake 600 ml   Output 1601 ml   Net -1001 ml       General: Awake, alert and in no apparent distress   Remainder of exam deferred        Son Zendejas MD, 9/6/2019, 9:09 AM  Interventional Pulmonology Fellow  Department of Pulmonary, Allergy, Critical Care & Sleep Medicine   Pager: [phone]

## 2019-09-06 NOTE — PROGRESS NOTES
Pt is alert and oriented x4. B/P runs high, below parameter for PRN hydralazine. Up to bathroom with [place]orge brace, walker and SBA.Voided x2, urine was not saved.C/O nausea, relief with scheduled zofran 8 mg ODT and compazine 10 mg tab po.Fair appetite.Pt had radiation today.  Discharge  PIV removed by vascular staff. Discharge instruction was revised with pt, discharge paper work given to pt. Daughter to give pt ride home.Pt to follow up with clinic appointments and to  continue to take medication  per discharge instruction.    Pt left floor around 3:15 pm escorted by daughter and CNA.

## 2019-09-06 NOTE — PROGRESS NOTES
Prior Authorization Approval    oxycodone 5mg tabs  Date Initiated: 9/6/2019  Date Completed: 9/6/2019  Prior Auth Type: Quantity Limit                Status: Approved    Effective Date: 08/07/2019 - 09/05/2022  Copay: 8.00     [place] Filled: Yes    Insurance: [employer] - Phone [phone] Fax [phone]  ID: [id number]  Case Number: [id number]   Submitted Via: Pedro Jordan  [facility] Liaison  Ph: [phone] Page: [phone]

## 2019-09-06 NOTE — PROGRESS NOTES
Social Work Services Progress Note    Keshawnr received a request from PA to assist patient with a referral to the [facility]. Patient wants to discharge today but will need to stay locally for daily radiation.     Christiane completed referral and faxed for processing.    PAMELA Parr  7D Hematology/Oncology Social Worker  Phone: [phone]  Pager: [phone]  [email or web address]

## 2019-09-06 NOTE — PROGRESS NOTES
[facility]  Palliative Care Daily Progress Note       Recommendations & Counseling       agree with stopping MS Contin    continue prn oxycodone but would reduce dose to 5-10 mg dose range--I reordered the discharge medication and contacted discharge pharmacy to make this change    Continue tylenol 1000 mg TID    Continue with dexamethasone 4mg bid through radiation treatments    Continue lidocaine patch    Continue bowel regimen - miralax and senna-counseled pt on how she may need more or less bowel regimen depending on how much opioids she uses    Counseled her on option for outpatient palliative care physician and/or counseling services and gave her contact information. She does not feel she needs outpatient palliative support right now (which is appropriate at this time ) but is interested in having the option to see us in the future.      Total time spent was 40 minutes,  >50% of time was spent counseling and/or coordination of care regarding symptom and medication review, counseling and support.    Shara Baker MD / Palliative Medicine / Pager [phone] / [facility] Inpatient Team Consult Pager [phone] (answered 8am-430pm M-F) - ok to text page via [employer] / After-Hours Answering Service [phone] / Palliative Clinic in the [facility] at the [facility] - [phone] (scheduling); [phone] (triage).          Assessments          Shaneka Alvarez is a 59 year old female with hx MS (currently stable, baseline mild right leg and arm weakness) and breast cancer s/p bilateral mastectomies, and recent findings of metastatic disease on imaging, was admitted for pain control and accelerated workup, now confirmed metastatic breast cancer.        Today, the patient was seen for:  Metastatic breast cancer  Back pain  Nausea  Anxiety  support    Prognosis, Goals, or Advance Care Planning was addressed today with: No.  Mood, coping, and/or meaning in the context of serious illness  were addressed today: Yes.  Summary/Comments: she is feeling a little better now that she knows what she is facing and has a plan. She acknowledges importance of counseling and mental health through her treatment process and is open to seeing palliative SW in the future and has information to reach out if/when she is ready            Interval History:     Chart review/discussion with unit or clinical team members:   No acute events  Path returned confirming metastatic breast cancer    Per patient or family/caregivers today:  Her pain will well controlled, using no prn oxycodone today and declining the MS Contin as well. Had large BM with diarrhea after the pink lady enema  Feeling calmer about the plan now she has a diagnosis and feeling good about discharging  Is open to taking as needed oxycodone but needing much less than prior and wanting to use as little as needed - interested in lower dose option    Key Palliative Symptoms:  # Pain severity the last 12 hours: low  # Dyspnea severity the last 12 hours: none  # Nausea severity the last 12 hours: none  # Anxiety severity the last 12 hours: low    Patient is on opioids: assessed and bowels ok/no needed changes to plan of care today.           Review of Systems:     Besides above, an additional focused system ROS was reviewed and is unremarkable          Medications:     I have reviewed this patient's medication profile and medications during this hospitalization.    Noted meds:    Refusing MS contin  Oxycodone - none yesterday or today. Last dose was 10mg x2 9/4  Dex 4mg bid  Tylenol 1000mg tid  Lido patch  Topical diclofenac -not using consistently  Topical ketamien-aurelio-lido cream           Physical Exam:   Vitals were reviewed  Temp: 96.2  F (35.7  C) Temp src: Oral BP: (!) 155/106 Pulse: 78 Heart Rate: 106 Resp: 18 SpO2: 96 % O2 Device: None (Room air) Oxygen Delivery: 2 LPM  Gen: sitting/lying in bed. Appears comfortable   HEENT: NCAT. Conjunctiva clear.  Sclera anicteric .  CV: RRR , Peripheral perfusion intact.   Resp: unlabored work of breathing, CTAB  Abd: soft, nt, nd, +BS   Msk: no gross deformity, no sarcopenia, no pain with palpation over spine or with turning in bed  Skin:  no jaundice  Ext: warm, well perfused.   Neuro: face symmetric. EOM, vision, hearing grossly intact. Speech fluent. Moves all extremities   Mental status/Psych: alert. Oriented. Asks/answers questions appropriately. Affect is calm and smiling               Data Reviewed:     Reviewed recent pertinent imaging, comments:       Reviewed recent labs, comments:   GFR>90  Pathology --+breast cancer

## 2019-09-06 NOTE — PLAN OF CARE
Occupational Therapy Discharge Summary    Reason for therapy discharge:    Discharged to home.    Progress towards therapy goal(s). See goals on Care Plan in [facility] electronic health record for goal details.  Goals partially met.  Barriers to achieving goals:   discharge from facility.    Therapy recommendation(s):    Continue home exercise program.

## 2019-09-07 ENCOUNTER — APPOINTMENT (OUTPATIENT)
Dept: RADIATION ONCOLOGY | Facility: CLINIC | Age: 59
End: 2019-09-07
Attending: RADIOLOGY
Payer: COMMERCIAL

## 2019-09-07 LAB
BACTERIA SPEC CULT: NORMAL
SPECIMEN SOURCE: NORMAL

## 2019-09-07 PROCEDURE — 77412 RADIATION TX DELIVERY LVL 3: CPT | Performed by: RADIOLOGY

## 2019-09-07 NOTE — PLAN OF CARE
Physical Therapy Discharge Summary    Reason for therapy discharge:    Discharged to Counts include 234 beds at the [facility]     Progress towards therapy goal(s). See goals on Care Plan in [facility] electronic health record for goal details.  Goals partially met.  Barriers to achieving goals:   discharge from facility.    Therapy recommendation(s):    Continue home exercise program.

## 2019-09-08 LAB
ACID FAST STN SPEC QL: NORMAL
BACTERIA SPEC CULT: ABNORMAL
Lab: ABNORMAL
SPECIMEN SOURCE: ABNORMAL
SPECIMEN SOURCE: NORMAL

## 2019-09-09 ENCOUNTER — APPOINTMENT (OUTPATIENT)
Dept: RADIATION ONCOLOGY | Facility: CLINIC | Age: 59
End: 2019-09-09
Attending: RADIOLOGY
Payer: COMMERCIAL

## 2019-09-09 ENCOUNTER — TELEPHONE (OUTPATIENT)
Dept: FAMILY MEDICINE | Facility: OTHER | Age: 59
End: 2019-09-09

## 2019-09-09 LAB — COPATH REPORT: NORMAL

## 2019-09-09 PROCEDURE — 77417 THER RADIOLOGY PORT IMAGE(S): CPT | Performed by: RADIOLOGY

## 2019-09-09 PROCEDURE — 77412 RADIATION TX DELIVERY LVL 3: CPT | Performed by: RADIOLOGY

## 2019-09-09 NOTE — TELEPHONE ENCOUNTER
Reason for follow up call: Shaneka Alvarez appeared on our list for being seen in and recenlty discharge from the Hospital.    Chief Complaint   Patient presents with     Hospital F/U     Low Back Pain Without Sciatica, Unspecified Back Pain Laterality, Unspecified Chronicity, Carcinoma Of Breast Metastat       Encounter routed for Clinic RN to call for follow up      Kathy Farias, RN, BSN

## 2019-09-10 ENCOUNTER — OFFICE VISIT (OUTPATIENT)
Dept: RADIATION ONCOLOGY | Facility: CLINIC | Age: 59
End: 2019-09-10
Attending: RADIOLOGY
Payer: COMMERCIAL

## 2019-09-10 VITALS — DIASTOLIC BLOOD PRESSURE: 67 MMHG | HEART RATE: 70 BPM | SYSTOLIC BLOOD PRESSURE: 149 MMHG

## 2019-09-10 DIAGNOSIS — C79.9 METASTATIC DISEASE (H): Primary | ICD-10-CM

## 2019-09-10 LAB
COPATH REPORT: NORMAL
P JIROVECII DNA SPEC QL NAA+PROBE: NOT DETECTED
SPECIMEN SOURCE: NORMAL

## 2019-09-10 PROCEDURE — 77387 GUIDANCE FOR RADJ TX DLVR: CPT | Performed by: RADIOLOGY

## 2019-09-10 PROCEDURE — 77412 RADIATION TX DELIVERY LVL 3: CPT | Performed by: RADIOLOGY

## 2019-09-10 ASSESSMENT — PAIN SCALES - GENERAL: PAINLEVEL: NO PAIN (0)

## 2019-09-10 NOTE — LETTER
Date:September 17, 2019      Provider requested that no letter be sent. Do not send.       [facility] Health Information

## 2019-09-10 NOTE — LETTER
9/10/2019       RE: Shaneka Alvarez  [address] [address]     Dear Colleague,    Thank you for referring your patient, Shaneka Alvarez, to the RADIATION ONCOLOGY CLINIC. Please see a copy of my visit note below.    [facility] PHYSICIANS  SPECIALIZING IN BREAKTHROUGHS  Radiation Oncology    On Treatment Visit Note      Shaneka Alvarez      Date: 2019   MRN: [id number]   : 1960  Diagnosis: Metastatic breast cancer      Reason for Visit:  On Radiation Treatment Visit     Treatment Summary to Date  Treatment Site: T12-L5 Current Dose: 900/3000 cGy Fractions: 3/10(Seen PRE-treatment today.)           Subjective:     Nausea well controlled with antiemetic. No other complaints    Nursing ROS:   Nutrition Alteration  Diet Type: Patient's Preference  Skin  Skin Reaction: 0 - No changes        Cardiovascular  Respiratory effort: 1 - Normal - without distress  Gastrointestinal  Nausea: 0 - None  Genitourinary  Urinary Status: 0 - Normal  Psychosocial  Mood - Anxiety: 0 - Normal  Pain Assessment  0-10 Pain Scale: 4  Pain Descriptors: Aching      Objective:   BP (!) 149/67   Pulse 70   Gen: Appears well, in no acute distress  Skin: No erythema    Labs:  CBC RESULTS:   Recent Labs   Lab Test 19  1501   WBC 9.1   RBC 5.46*   HGB 15.7   HCT 47.8*   MCV 88   MCH 28.8   MCHC 32.8   RDW 13.1        ELECTROLYTES:  Recent Labs   Lab Test 19  1501      POTASSIUM 3.6   CHLORIDE 100   RAFAELA 9.0   CO2 28   BUN 11   CR 0.82   GLC 89       Assessment:    Tolerating radiation therapy well.  All questions and concerns addressed. Very happy with radiotherapy care thus far.    Toxicities:  Fatigue: Grade 0: No toxicity  Nausea: Grade 1: Loss of appetite without alteration in eating habits      Plan:   1. Continue current therapy.        Mosaiq chart and setup information reviewed  MVCT/IGRT images checked    Medication Review  Med list reviewed with patient?: Yes  Med Note: Pt  reports unchanged.    Educational Topic Discussed  Education Instructions: Reviewed        Mateo Keller MD    Please do not send letter to referring physician.        Again, thank you for allowing me to participate in the care of your patient.      Sincerely,    Mateo Keller MD

## 2019-09-11 ENCOUNTER — APPOINTMENT (OUTPATIENT)
Dept: RADIATION ONCOLOGY | Facility: CLINIC | Age: 59
End: 2019-09-11
Attending: RADIOLOGY
Payer: COMMERCIAL

## 2019-09-11 LAB
RESULT: NORMAL
SEND OUTS MISC TEST CODE: NORMAL
SEND OUTS MISC TEST SPECIMEN: NORMAL
TEST NAME: NORMAL

## 2019-09-11 PROCEDURE — 77412 RADIATION TX DELIVERY LVL 3: CPT | Performed by: RADIOLOGY

## 2019-09-11 PROCEDURE — 77336 RADIATION PHYSICS CONSULT: CPT | Performed by: RADIOLOGY

## 2019-09-11 PROCEDURE — 77387 GUIDANCE FOR RADJ TX DLVR: CPT | Performed by: RADIOLOGY

## 2019-09-12 ENCOUNTER — APPOINTMENT (OUTPATIENT)
Dept: RADIATION ONCOLOGY | Facility: CLINIC | Age: 59
End: 2019-09-12
Attending: RADIOLOGY
Payer: COMMERCIAL

## 2019-09-12 PROCEDURE — 77387 GUIDANCE FOR RADJ TX DLVR: CPT | Performed by: RADIOLOGY

## 2019-09-12 PROCEDURE — 77412 RADIATION TX DELIVERY LVL 3: CPT | Performed by: RADIOLOGY

## 2019-09-13 ENCOUNTER — APPOINTMENT (OUTPATIENT)
Dept: RADIATION ONCOLOGY | Facility: CLINIC | Age: 59
End: 2019-09-13
Attending: RADIOLOGY
Payer: COMMERCIAL

## 2019-09-13 PROCEDURE — 77412 RADIATION TX DELIVERY LVL 3: CPT | Performed by: RADIOLOGY

## 2019-09-13 PROCEDURE — 77387 GUIDANCE FOR RADJ TX DLVR: CPT | Performed by: RADIOLOGY

## 2019-09-13 NOTE — PROGRESS NOTES
[facility] PHYSICIANS  SPECIALIZING IN BREAKTHROUGHS  Radiation Oncology    On Treatment Visit Note      Shaneka Alvarez      Date: 2019   MRN: [id number]   : 1960  Diagnosis: Metastatic breast cancer      Reason for Visit:  On Radiation Treatment Visit     Treatment Summary to Date  Treatment Site: T12-L5 Current Dose: 900/3000 cGy Fractions: 3/10(Seen PRE-treatment today.)           Subjective:     Nausea well controlled with antiemetic. No other complaints    Nursing ROS:   Nutrition Alteration  Diet Type: Patient's Preference  Skin  Skin Reaction: 0 - No changes        Cardiovascular  Respiratory effort: 1 - Normal - without distress  Gastrointestinal  Nausea: 0 - None  Genitourinary  Urinary Status: 0 - Normal  Psychosocial  Mood - Anxiety: 0 - Normal  Pain Assessment  0-10 Pain Scale: 4  Pain Descriptors: Aching      Objective:   BP (!) 149/67   Pulse 70   Gen: Appears well, in no acute distress  Skin: No erythema    Labs:  CBC RESULTS:   Recent Labs   Lab Test 19  1501   WBC 9.1   RBC 5.46*   HGB 15.7   HCT 47.8*   MCV 88   MCH 28.8   MCHC 32.8   RDW 13.1        ELECTROLYTES:  Recent Labs   Lab Test 19  1501      POTASSIUM 3.6   CHLORIDE 100   RAFAELA 9.0   CO2 28   BUN 11   CR 0.82   GLC 89       Assessment:    Tolerating radiation therapy well.  All questions and concerns addressed. Very happy with radiotherapy care thus far.    Toxicities:  Fatigue: Grade 0: No toxicity  Nausea: Grade 1: Loss of appetite without alteration in eating habits      Plan:   1. Continue current therapy.        Mosaiq chart and setup information reviewed  MVCT/IGRT images checked    Medication Review  Med list reviewed with patient?: Yes  Med Note: Pt reports unchanged.    Educational Topic Discussed  Education Instructions: Reviewed        Mateo Keller MD    Please do not send letter to referring physician.

## 2019-09-16 ENCOUNTER — APPOINTMENT (OUTPATIENT)
Dept: RADIATION ONCOLOGY | Facility: CLINIC | Age: 59
End: 2019-09-16
Attending: RADIOLOGY
Payer: COMMERCIAL

## 2019-09-16 PROCEDURE — 77387 GUIDANCE FOR RADJ TX DLVR: CPT | Performed by: RADIOLOGY

## 2019-09-16 PROCEDURE — 77412 RADIATION TX DELIVERY LVL 3: CPT | Performed by: RADIOLOGY

## 2019-09-17 ENCOUNTER — APPOINTMENT (OUTPATIENT)
Dept: RADIATION ONCOLOGY | Facility: CLINIC | Age: 59
End: 2019-09-17
Attending: RADIOLOGY
Payer: COMMERCIAL

## 2019-09-17 VITALS
BODY MASS INDEX: 29.08 KG/M2 | HEART RATE: 59 BPM | DIASTOLIC BLOOD PRESSURE: 90 MMHG | WEIGHT: 159 LBS | SYSTOLIC BLOOD PRESSURE: 128 MMHG

## 2019-09-17 DIAGNOSIS — C79.9 METASTATIC DISEASE (H): Primary | ICD-10-CM

## 2019-09-17 PROCEDURE — 77412 RADIATION TX DELIVERY LVL 3: CPT | Performed by: RADIOLOGY

## 2019-09-17 PROCEDURE — 77387 GUIDANCE FOR RADJ TX DLVR: CPT | Performed by: RADIOLOGY

## 2019-09-17 ASSESSMENT — PAIN SCALES - GENERAL: PAINLEVEL: NO PAIN (0)

## 2019-09-17 NOTE — PROGRESS NOTES
[facility] PHYSICIANS  SPECIALIZING IN BREAKTHROUGHS  Radiation Oncology    On Treatment Visit Note      Shaneka Alvarez      Date: 2019   MRN: [id number]   : 1960  Diagnosis: Metastatic breast cancer      Reason for Visit:  On Radiation Treatment Visit     Treatment Summary to Date  Treatment Site: T12-L5 Current Dose: 2700/3000 cGy Fractions: 9/10(Seen PRE-treatment today.)           Subjective:     Nausea well controlled with antiemetics. Using less narcotic medication this week, only 1-2 tabs of oxycodone per day. Otherwise doing well with no complaints    Nursing ROS:   Nutrition Alteration  Diet Type: Patient's Preference  Skin  Skin Reaction: 0 - No changes        Cardiovascular  Respiratory effort: 1 - Normal - without distress  Gastrointestinal  Nausea: 0 - None  Genitourinary  Urinary Status: 0 - Normal  Psychosocial  Mood - Anxiety: 0 - Normal  Pain Assessment  0-10 Pain Scale: 2  Pain Note: Pt reports managing well.      Objective:   BP (!) 128/90   Pulse 59   Wt 72.1 kg (159 lb)   BMI 29.08 kg/m    Gen: Appears well, in no acute distress  Skin: No erythema    Labs:  CBC RESULTS:   Recent Labs   Lab Test 19  1501   WBC 9.1   RBC 5.46*   HGB 15.7   HCT 47.8*   MCV 88   MCH 28.8   MCHC 32.8   RDW 13.1        ELECTROLYTES:  Recent Labs   Lab Test 19  1501      POTASSIUM 3.6   CHLORIDE 100   RAFAELA 9.0   CO2 28   BUN 11   CR 0.82   GLC 89       Assessment:    Tolerating radiation therapy well.  All questions and concerns addressed.    Toxicities:  Nausea: Grade 1: Loss of appetite without alteration in eating habits    Plan:   1. Continue current therapy.    2. Continue antiemetics as previously prescribed  3. Continue to wean narcotics as tolerated    Mosaiq chart and setup information reviewed  MVCT/IGRT images checked    Medication Review  Med list reviewed with patient?: Yes  Med Note: Pt reports unchanged.    Educational Topic Discussed  Education  Instructions: Reviewed        Mateo Keller MD    Please do not send letter to referring physician.

## 2019-09-17 NOTE — LETTER
Date:September 19, 2019      Provider requested that no letter be sent. Do not send.       [facility] Health Information

## 2019-09-17 NOTE — LETTER
2019       RE: Shaneka Alvarez  [address] [facility] [address]     Dear Colleague,    Thank you for referring your patient, Shaneka Alvarez, to the RADIATION ONCOLOGY CLINIC. Please see a copy of my visit note below.    Falls Risk Screening Questions - Weekly OTV    1. Have you fallen in the past one week?  No.  2. Have you felt unsteady when walking or standing in the past one week?  No.      If patient answers yes to one or more of the above questions, identify as Falls Risk.    Completed By: Nory MURRAY    [facility]  SPECIALIZING IN BREAKTHROUGHS  Radiation Oncology    On Treatment Visit Note      Shaneka Alvarez      Date: 2019   MRN: [id number]   : 1960  Diagnosis: Metastatic breast cancer      Reason for Visit:  On Radiation Treatment Visit     Treatment Summary to Date  Treatment Site: T12-L5 Current Dose: 2700/3000 cGy Fractions: 9/10(Seen PRE-treatment today.)           Subjective:     Nausea well controlled with antiemetics. Using less narcotic medication this week, only 1-2 tabs of oxycodone per day. Otherwise doing well with no complaints    Nursing ROS:   Nutrition Alteration  Diet Type: Patient's Preference  Skin  Skin Reaction: 0 - No changes        Cardiovascular  Respiratory effort: 1 - Normal - without distress  Gastrointestinal  Nausea: 0 - None  Genitourinary  Urinary Status: 0 - Normal  Psychosocial  Mood - Anxiety: 0 - Normal  Pain Assessment  0-10 Pain Scale: 2  Pain Note: Pt reports managing well.      Objective:   BP (!) 128/90   Pulse 59   Wt 72.1 kg (159 lb)   BMI 29.08 kg/m     Gen: Appears well, in no acute distress  Skin: No erythema    Labs:  CBC RESULTS:   Recent Labs   Lab Test 19  1501   WBC 9.1   RBC 5.46*   HGB 15.7   HCT 47.8*   MCV 88   MCH 28.8   MCHC 32.8   RDW 13.1        ELECTROLYTES:  Recent Labs   Lab Test 19  1501      POTASSIUM 3.6   CHLORIDE 100   RAFAELA 9.0   CO2 28   BUN 11   CR 0.82   GLC 89        Assessment:    Tolerating radiation therapy well.  All questions and concerns addressed.    Toxicities:  Nausea: Grade 1: Loss of appetite without alteration in eating habits    Plan:   1. Continue current therapy.    2. Continue antiemetics as previously prescribed  3. Continue to wean narcotics as tolerated    Mosaiq chart and setup information reviewed  MVCT/IGRT images checked    Medication Review  Med list reviewed with patient?: Yes  Med Note: Pt reports unchanged.    Educational Topic Discussed  Education Instructions: Reviewed        Mateo Keller MD    Please do not send letter to referring physician.        Again, thank you for allowing me to participate in the care of your patient.      Sincerely,    Mateo Keller MD

## 2019-09-17 NOTE — PROGRESS NOTES
Falls Risk Screening Questions - Weekly OTV    1. Have you fallen in the past one week?  No.  2. Have you felt unsteady when walking or standing in the past one week?  No.      If patient answers yes to one or more of the above questions, identify as Falls Risk.    Completed By: Nory MURRAY

## 2019-09-18 ENCOUNTER — ONCOLOGY VISIT (OUTPATIENT)
Dept: ONCOLOGY | Facility: CLINIC | Age: 59
End: 2019-09-18
Payer: COMMERCIAL

## 2019-09-18 ENCOUNTER — DOCUMENTATION ONLY (OUTPATIENT)
Dept: PHARMACY | Facility: CLINIC | Age: 59
End: 2019-09-18

## 2019-09-18 ENCOUNTER — APPOINTMENT (OUTPATIENT)
Dept: RADIATION ONCOLOGY | Facility: CLINIC | Age: 59
End: 2019-09-18
Attending: RADIOLOGY
Payer: COMMERCIAL

## 2019-09-18 VITALS
BODY MASS INDEX: 29.26 KG/M2 | SYSTOLIC BLOOD PRESSURE: 135 MMHG | WEIGHT: 159 LBS | DIASTOLIC BLOOD PRESSURE: 91 MMHG | TEMPERATURE: 97.6 F | HEIGHT: 62 IN | HEART RATE: 73 BPM | RESPIRATION RATE: 16 BRPM | OXYGEN SATURATION: 97 %

## 2019-09-18 DIAGNOSIS — C50.919 METASTATIC BREAST CANCER: ICD-10-CM

## 2019-09-18 PROCEDURE — 77387 GUIDANCE FOR RADJ TX DLVR: CPT | Performed by: RADIOLOGY

## 2019-09-18 PROCEDURE — 77412 RADIATION TX DELIVERY LVL 3: CPT | Performed by: RADIOLOGY

## 2019-09-18 PROCEDURE — 99215 OFFICE O/P EST HI 40 MIN: CPT | Performed by: INTERNAL MEDICINE

## 2019-09-18 PROCEDURE — 77336 RADIATION PHYSICS CONSULT: CPT | Performed by: RADIOLOGY

## 2019-09-18 ASSESSMENT — PAIN SCALES - GENERAL: PAINLEVEL: MODERATE PAIN (4)

## 2019-09-18 ASSESSMENT — MIFFLIN-ST. JEOR: SCORE: 1259.6

## 2019-09-18 NOTE — PROGRESS NOTES
"Oral Chemotherapy Monitoring Program    Primary Oncologist: Dr. Dewayne Zepeda  Primary Oncology Clinic: [facility]  Cancer Diagnosis: breast cancer    Drug: Xeloda  Start Date: 9/30/19  Expected duration of therapy: Until disease progression or unacceptable toxicity    Drug Interaction Assessment: no clinically significant drug interactions    Lab Monitoring Plan  CMP/CBC [id number] during off week    Subjective/Objective:  Shaneka Alvarez is a 59 year old female seen in clinic for an initial visit for oral chemotherapy education. She was accompanied by her  and 2 of her children.     No flowsheet data found.    Vitals:  BP:   BP Readings from Last 1 Encounters:   09/18/19 (!) 135/91     Wt Readings from Last 1 Encounters:   09/18/19 72.1 kg (159 lb)     Estimated body surface area is 1.78 meters squared as calculated from the following:    Height as of an earlier encounter on 9/18/19: 1.575 m (5' 2.01\").    Weight as of an earlier encounter on 9/18/19: 72.1 kg (159 lb).      Labs:  _  Result Component Current Result Ref Range   Sodium 136 (9/4/2019) 133 - 144 mmol/L     _  Result Component Current Result Ref Range   Potassium 3.6 (9/4/2019) 3.4 - 5.3 mmol/L     _  Result Component Current Result Ref Range   Calcium 9.0 (9/4/2019) 8.5 - 10.1 mg/dL     No results found for Mag within last 30 days.     No results found for Phos within last 30 days.     No results found for ALBUMIN within last 30 days.     _  Result Component Current Result Ref Range   Urea Nitrogen 11 (9/4/2019) 7 - 30 mg/dL     _  Result Component Current Result Ref Range   Creatinine 0.82 (9/4/2019) 0.52 - 1.04 mg/dL       No results found for AST within last 30 days.     No results found for ALT within last 30 days.     No results found for BILITOTAL within last 30 days.       _  Result Component Current Result Ref Range   WBC 9.1 (9/4/2019) 4.0 - 11.0 10e9/L     _  Result Component Current Result Ref Range "   Hemoglobin 15.7 (9/4/2019) 11.7 - 15.7 g/dL     _  Result Component Current Result Ref Range   Platelet Count 251 (9/4/2019) 150 - 450 10e9/L     _  Result Component Current Result Ref Range   Absolute Neutrophil 6.9 (9/4/2019) 1.6 - 8.3 10e9/L       Assessment:  Patient is appropriate to start therapy.    Plan:  Basic chemotherapy teaching was reviewed with the patient and the patient's family including indication, start date of therapy, dose, administration, adverse effects, missed doses, food and drug interactions, monitoring, side effect management, office contact information, and safe handling. Written materials were provided and all questions answered.    Follow-Up:  1 week after the start of therapy     Tono Callahan, PharmD, MS  Hematology/Oncology Clinical Pharmacist  [facility]  [facility]

## 2019-09-18 NOTE — PATIENT INSTRUCTIONS
Start Xeloda twice daily once available    Refer to palliative care and spine specialist    See me back 3 weeks after start of Xeloda    Get dental clearance to start Zometa

## 2019-09-18 NOTE — PROGRESS NOTES
ONCOLOGY FOLLOW UP NOTE: 9/18/2019       I took the history from reviewing the previous notes that she was following with Dr. Amaya.  I have copied and updated from prior notes.    ONCOLOGY History:    1.  January 2006:  Diagnosed with Stage IIB, T2 N1 M0 invasive lobular carcinoma of the right breast.  Final pathology showed a 4.5 x 3.8 x 2.5 cm, 01/14 lymph nodes positive.  Estrogen, progesterone receptor positive, HER-2 negative.     2.  Genetics.  BRCA1 and 2 mutations not detected. 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       THERAPY TO DATE:   1.  2006:  ECOG 2104 protocol of dose-dense Adriamycin, Cytoxan and Avastin x4 cycles followed by Taxol and Avastin x4 cycles.   2.  03/2007:  Completed 1 year Avastin.   3.  11/2006-01/2007:  Radiotherapy to the right breast of 5040 cGy.   4.  [id number]:  Aromasin.  Stopped after moving to the [facility].   5.  01/2016:  Right modified radical mastectomy with latissimus dorsi flap reconstruction and a left prophylactic mastectomy with latissimus dorsi flap reconstruction.     She recently had MRI of the brain as a follow-up for multiple sclerosis and there were multiple calvarial lesions noted which was suspicious for metastatic disease.  There was no evidence of new multiple sclerosis lesions.  She had a PET scan on 8/30/2019 which showed widespread bone metastatic lesions (calvarium, spine, sacrum, pelvis, ribs most prominent at C7, T3, L1 and L4), hypermetabolic 3 cm lesion in LLL, and mediastinal/hilar LN. CEA wnl at 1.9 and C27-29 elevated to 415 (28 on 8/23/16). A CT guided biopsy of the right iliac bone was obtained on 9/4/19, pathology consistent with metastatic adenocarcinoma (breast), ER/VT negative, HER-2 negative PDL 1 < 1%. A second biopsy was take of the LLL nodule via EBUS on 9/5/19 did not show any malignancy.    C/T/L spine MRI 8/3/19 to 9/2/19 with numerous enhancing lesions of the C, T and L spine, largest around T9 and L1. No evidence  of cord compression. Has a L1 compression fracture and impending L4.     Received her radiation T12-L5 3000 cGy in 10 fractions from 9/6/2019 till 9/18/2019.  Neurosurgery recommended no surgical intervention but to wear Norman brace when out of bed and HOB >30 degrees    She comes in today accompanied by her  and 2 daughters.  She tells me that over the last more than a year or so she was having back pain and bilateral rib cage pain.  She went to chiropractor with some relief.  When she had the brain MRI as part of work-up for multiple sclerosis, then she was told about the new lesions and had further work-up done.  She mentions that over the last 1 year or so the pain has progressively increased.  She is also feeling more tired to the point that even a little bit of walking makes her exhausted.  She also mentions about 50 pound weight loss over the last 2 years which initially was intentional but then she noticed that it is too easy to lose weight over the last several months.  She denies any diarrhea or constipation.  Off-and-on she has been nauseous but this is not very unusual for her.  Denies new swellings.  Denies bleeding.  Denies infections.  Denies shortness of breath.  She has mild neuropathy.  She tells me multiple sclerosis has been under good control.  She usually feels fatigued due to it and also notices heat sensitivity and weakness in the legs and balance issues and some forgetfulness frontal sclerosis.   Currently she is not taking any medications for this.    ECOG 2    ROS:  A comprehensive ROS was otherwise neg         PAST MEDICAL HISTORY:     1.  Breast cancer  2.  Multiple sclerosis.   3.  Depression.   4.  Migraines.   5.  Hypertension.   6.  Cholecystectomy and umbilical hernia repair.     SOCIAL HISTORY: She smoked for 5 years but quit many years ago.  Drinks alcohol socially.  She lives with her .  She is a teacher in middle school.       FAMILY HISTORY: She mentions that her  "mother had breast cancer at 49.  Both grandmothers had breast cancer but she is not sure of the age.  A couple of cousins have cancers.  Patient has 3 children who are healthy.    Current Outpatient Medications   Medication     acetaminophen (TYLENOL) 500 MG tablet     busPIRone (BUSPAR) 15 MG tablet     calcium citrate-vitamin D (CITRACAL) 315-250 MG-UNIT TABS     Cholecalciferol (VITAMIN D3 PO)     dexamethasone (DECADRON) 4 MG tablet     diclofenac (VOLTAREN) 1 % topical gel     erenumab-aooe (AIMOVIG 140 DOSE) 70 MG/ML injection     Lidocaine (LIDOCARE) 4 % Patch     LORazepam (ATIVAN) 0.5 MG tablet     magnesium 250 MG tablet     ondansetron (ZOFRAN-ODT) 8 MG ODT tab     oxyCODONE IR (ROXICODONE) 5 MG tablet     prochlorperazine (COMPAZINE) 10 MG tablet     propranolol (INDERAL LA) 60 MG 24 hr capsule     ranitidine (ZANTAC) 150 MG tablet     syringe/needle, disp, (B-D INTE[employer]RA SYRINGE) 23G X 1\" 3 ML MISC     tolterodine ER (DETROL LA) 4 MG 24 hr capsule     venlafaxine (EFFEXOR-ER) 225 MG TB24 24 hr tablet     polyethylene glycol (MIRALAX/GLYCOLAX) packet     senna-docusate (SENOKOT-S/PERICOLACE) 8.6-50 MG tablet     No current facility-administered medications for this visit.           Allergies   Allergen Reactions     Bactrim [Sulfamethoxazole W/Trimethoprim]      Internal bleeding     Copaxone [Glatiramer] Hives          PHYSICAL EXAMINATION:   BP (!) 135/91 (BP Location: Left arm)   Pulse 73   Temp 97.6  F (36.4  C) (Oral)   Resp 16   Ht 1.575 m (5' 2.01\")   Wt 72.1 kg (159 lb)   SpO2 97%   BMI 29.07 kg/m    Wt Readings from Last 4 Encounters:   09/18/19 72.1 kg (159 lb)   09/17/19 72.1 kg (159 lb)   09/03/19 74 kg (163 lb 2.3 oz)   08/15/19 75.4 kg (166 lb 4.8 oz)     CONSTITUTIONAL: no acute distress  EYES: PERRLA, no palor or icterus.   ENT/MOUTH: no mouth lesions. Ears normal  CVS: s1s2 no m r g .   RESPIRATORY: clear to auscultation b/l apart from coarse breath sounds at the left lung " base.  GI: soft non tender no hepatosplenomegaly  NEURO: AAOX3  Grossly non focal neuro exam  INTEGUMENT: no obvious rashes  LYMPHATIC: no palpable cervical, supraclavicular, axillary or inguinal LAD  MUSCULOSKELETAL: Unremarkable. No bony tenderness.   EXTREMITIES: no edema  PSYCH: Mentation, mood and affect are normal. Decision making capacity is intact    Labs and imaging reviewed.         ASSESSMENT AND PLAN:   1.  History of stage IIB, T2 N1 M0 invasive lobular carcinoma of the right breast.  It was initially diagnosed in 2006 and at that time it was hormone receptor positive, HER-2 negative.  She was treated on ECOG 2104 protocol with dose-dense Adriamycin, Cytoxan and Avastin x4 cycles followed by Taxol and Avastin x4 cycles followed by a Avastin for 1 year.  She also received radiation to the right breast which she completed in January 2007 (5040 cGy).  She took Aromasin from 2007 to 2014.    Now she has metastatic breast cancer with extensive involvement of the bones.  There is also a left lower lobe hypermetabolic lesion and mediastinal lymph nodes which are hypermetabolic.  The biopsy from the right iliac lymph node is consistent with metastatic lobular breast cancer but it is hormone receptor and HER-2 negative and PDL 1 is also negative.    She has received 3000 cGy of palliative radiation to T12-L5 from 9/6/2019 till 9/18/2019.    We discussed the situation in detail.  We discussed that the cancer is not curable and the treatment would be palliative in nature.  The cancer involves multiple bones.  Although the left lower lobe lung biopsy was negative but at this time I am not 100% certain whether the lesion is benign or malignant and I believe we need to follow it up with repeat his scans periodically.  Because she has received Adriamycin and Taxol in the past, I recommend starting Xeloda 1000 mg/m  twice a day for 14 days and then 1 week off.  We discussed the rational schedule and potential side  effects of it in detail.  I would like her to start this as soon as possible.  We would like to continue this for as long as she is able to tolerate and as long as cancer is not progressing on this.  I would like to repeat his scans in a couple of months.    Breast implant removal.  She tells me that she was planning to do breast implant removal because there was a recall on this.  Her surgery was scheduled for 9/24/2019.  Because of this new development of metastatic breast cancer and her recent radiation, surgery has been canceled.  We discussed that at this time treatment for metastatic breast cancer would take precedence over the other surgery as the other surgery would require her to be off chemotherapy for several weeks and in that case the chances of cancer progression would be high and I would not recommend that.    For the cancer in the bones causing pain.  For now she is taking oxycodone as needed for pain.  Overall her pain is better after receiving radiation.  She will continue the pain medication.  I will also refer her to palliative care.  When she was seen by neurosurgery she was told to wear Union brace when out of bed and HOB >30 degrees.  She is asking when she can remove the brace.  I will have her see spinal specialist to address this concern.    Discussion regarding Zometa.  Because of metastatic cancer to the bones, I recommend Zometa to strengthen the bones.  We discussed the rational schedule and potential side effects of it.  I would like her to get a dental clearance prior to that.  She is taking calcium and vitamin D which she should continue to take.  I will plan to give her Zometa every 3 months.      Port-A-Cath.  She mentions that there was some problem accessing the port while she was in the hospital and it stopped working.  We will see if it will work and whether it needs any adjustment.    Multiple sclerosis.  She will continue to follow with her neurologist Dr. Quiles.   Currently multiple sclerosis is under control and currently she is not taking any medications.    Discussion regarding healthcare directives.  She tells me that she has healthcare directive and she will bring it for our records.      I would like to see her back 3 weeks after starting Xeloda.    I answered all of her and her family's questions to their satisfaction.  They are agreeable and comfortable with the plan.    Dewayne Zepeda MD

## 2019-09-18 NOTE — LETTER
9/18/2019         RE: Shaneka Alvarez  [address] [facility] [address]        Dear Colleague,    Thank you for referring your patient, Shaneka Alvarez, to the [facility]. Please see a copy of my visit note below.    ONCOLOGY FOLLOW UP NOTE: 9/18/2019       I took the history from reviewing the previous notes that she was following with Dr. Amaya.  I have copied and updated from prior notes.    ONCOLOGY History:    1.  January 2006:  Diagnosed with Stage IIB, T2 N1 M0 invasive lobular carcinoma of the right breast.  Final pathology showed a 4.5 x 3.8 x 2.5 cm, 01/14 lymph nodes positive.  Estrogen, progesterone receptor positive, HER-2 negative.     2.  Genetics.  BRCA1 and 2 mutations not detected. 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       THERAPY TO DATE:   1.  2006:  ECOG 2104 protocol of dose-dense Adriamycin, Cytoxan and Avastin x4 cycles followed by Taxol and Avastin x4 cycles.   2.  03/2007:  Completed 1 year Avastin.   3.  11/2006-01/2007:  Radiotherapy to the right breast of 5040 cGy.   4.  03/[id number]:  Aromasin.  Stopped after moving to the [facility].   5.  01/2016:  Right modified radical mastectomy with latissimus dorsi flap reconstruction and a left prophylactic mastectomy with latissimus dorsi flap reconstruction.     She recently had MRI of the brain as a follow-up for multiple sclerosis and there were multiple calvarial lesions noted which was suspicious for metastatic disease.  There was no evidence of new multiple sclerosis lesions.  She had a PET scan on 8/30/2019 which showed widespread bone metastatic lesions (calvarium, spine, sacrum, pelvis, ribs most prominent at C7, T3, L1 and L4), hypermetabolic 3 cm lesion in LLL, and mediastinal/hilar LN. CEA wnl at 1.9 and C27-29 elevated to 415 (28 on 8/23/16). A CT guided biopsy of the right iliac bone was obtained on 9/4/19, pathology consistent with metastatic adenocarcinoma (breast), ER/GA negative, HER-2  negative PDL 1 < 1%. A second biopsy was take of the LLL nodule via EBUS on 9/5/19 did not show any malignancy.    C/T/L spine MRI 8/3/19 to 9/2/19 with numerous enhancing lesions of the C, T and L spine, largest around T9 and L1. No evidence of cord compression. Has a L1 compression fracture and impending L4.     Received her radiation T12-L5 3000 cGy in 10 fractions from 9/6/2019 till 9/18/2019.  Neurosurgery recommended no surgical intervention but to wear Ely brace when out of bed and HOB >30 degrees    She comes in today accompanied by her  and 2 daughters.  She tells me that over the last more than a year or so she was having back pain and bilateral rib cage pain.  She went to chiropractor with some relief.  When she had the brain MRI as part of work-up for multiple sclerosis, then she was told about the new lesions and had further work-up done.  She mentions that over the last 1 year or so the pain has progressively increased.  She is also feeling more tired to the point that even a little bit of walking makes her exhausted.  She also mentions about 50 pound weight loss over the last 2 years which initially was intentional but then she noticed that it is too easy to lose weight over the last several months.  She denies any diarrhea or constipation.  Off-and-on she has been nauseous but this is not very unusual for her.  Denies new swellings.  Denies bleeding.  Denies infections.  Denies shortness of breath.  She has mild neuropathy.  She tells me multiple sclerosis has been under good control.  She usually feels fatigued due to it and also notices heat sensitivity and weakness in the legs and balance issues and some forgetfulness frontal sclerosis.   Currently she is not taking any medications for this.    ECOG 2    ROS:  A comprehensive ROS was otherwise neg         PAST MEDICAL HISTORY:     1.  Breast cancer  2.  Multiple sclerosis.   3.  Depression.   4.  Migraines.   5.  Hypertension.   6.   "Cholecystectomy and umbilical hernia repair.     SOCIAL HISTORY: She smoked for 5 years but quit many years ago.  Drinks alcohol socially.  She lives with her .  She is a teacher in middle school.       FAMILY HISTORY: She mentions that her mother had breast cancer at 49.  Both grandmothers had breast cancer but she is not sure of the age.  A couple of cousins have cancers.  Patient has 3 children who are healthy.    Current Outpatient Medications   Medication     acetaminophen (TYLENOL) 500 MG tablet     busPIRone (BUSPAR) 15 MG tablet     calcium citrate-vitamin D (CITRACAL) 315-250 MG-UNIT TABS     Cholecalciferol (VITAMIN D3 PO)     dexamethasone (DECADRON) 4 MG tablet     diclofenac (VOLTAREN) 1 % topical gel     erenumab-aooe (AIMOVIG 140 DOSE) 70 MG/ML injection     Lidocaine (LIDOCARE) 4 % Patch     LORazepam (ATIVAN) 0.5 MG tablet     magnesium 250 MG tablet     ondansetron (ZOFRAN-ODT) 8 MG ODT tab     oxyCODONE IR (ROXICODONE) 5 MG tablet     prochlorperazine (COMPAZINE) 10 MG tablet     propranolol (INDERAL LA) 60 MG 24 hr capsule     ranitidine (ZANTAC) 150 MG tablet     syringe/needle, disp, (B-D INTEGRA SYRINGE) 23G X 1\" 3 ML MISC     tolterodine ER (DETROL LA) 4 MG 24 hr capsule     venlafaxine (EFFEXOR-ER) 225 MG TB24 24 hr tablet     polyethylene glycol (MIRALAX/GLYCOLAX) packet     senna-docusate (SENOKOT-S/PERICOLACE) 8.6-50 MG tablet     No current facility-administered medications for this visit.           Allergies   Allergen Reactions     Bactrim [Sulfamethoxazole W/Trimethoprim]      Internal bleeding     Copaxone [Glatiramer] Hives          PHYSICAL EXAMINATION:   BP (!) 135/91 (BP Location: Left arm)   Pulse 73   Temp 97.6  F (36.4  C) (Oral)   Resp 16   Ht 1.575 m (5' 2.01\")   Wt 72.1 kg (159 lb)   SpO2 97%   BMI 29.07 kg/m     Wt Readings from Last 4 Encounters:   09/18/19 72.1 kg (159 lb)   09/17/19 72.1 kg (159 lb)   09/03/19 74 kg (163 lb 2.3 oz)   08/15/19 75.4 kg (166 " lb 4.8 oz)     CONSTITUTIONAL: no acute distress  EYES: PERRLA, no palor or icterus.   ENT/MOUTH: no mouth lesions. Ears normal  CVS: s1s2 no m r g .   RESPIRATORY: clear to auscultation b/l apart from coarse breath sounds at the left lung base.  GI: soft non tender no hepatosplenomegaly  NEURO: AAOX3  Grossly non focal neuro exam  INTEGUMENT: no obvious rashes  LYMPHATIC: no palpable cervical, supraclavicular, axillary or inguinal LAD  MUSCULOSKELETAL: Unremarkable. No bony tenderness.   EXTREMITIES: no edema  PSYCH: Mentation, mood and affect are normal. Decision making capacity is intact    Labs and imaging reviewed.         ASSESSMENT AND PLAN:   1.  History of stage IIB, T2 N1 M0 invasive lobular carcinoma of the right breast.  It was initially diagnosed in 2006 and at that time it was hormone receptor positive, HER-2 negative.  She was treated on ECOG 2104 protocol with dose-dense Adriamycin, Cytoxan and Avastin x4 cycles followed by Taxol and Avastin x4 cycles followed by a Avastin for 1 year.  She also received radiation to the right breast which she completed in January 2007 (5040 cGy).  She took Aromasin from 2007 to 2014.    Now she has metastatic breast cancer with extensive involvement of the bones.  There is also a left lower lobe hypermetabolic lesion and mediastinal lymph nodes which are hypermetabolic.  The biopsy from the right iliac lymph node is consistent with metastatic lobular breast cancer but it is hormone receptor and HER-2 negative and PDL 1 is also negative.    She has received 3000 cGy of palliative radiation to T12-L5 from 9/6/2019 till 9/18/2019.    We discussed the situation in detail.  We discussed that the cancer is not curable and the treatment would be palliative in nature.  The cancer involves multiple bones.  Although the left lower lobe lung biopsy was negative but at this time I am not 100% certain whether the lesion is benign or malignant and I believe we need to follow it up  with repeat his scans periodically.  Because she has received Adriamycin and Taxol in the past, I recommend starting Xeloda 1000 mg/m  twice a day for 14 days and then 1 week off.  We discussed the rational schedule and potential side effects of it in detail.  I would like her to start this as soon as possible.  We would like to continue this for as long as she is able to tolerate and as long as cancer is not progressing on this.  I would like to repeat his scans in a couple of months.    Breast implant removal.  She tells me that she was planning to do breast implant removal because there was a recall on this.  Her surgery was scheduled for 9/24/2019.  Because of this new development of metastatic breast cancer and her recent radiation, surgery has been canceled.  We discussed that at this time treatment for metastatic breast cancer would take precedence over the other surgery as the other surgery would require her to be off chemotherapy for several weeks and in that case the chances of cancer progression would be high and I would not recommend that.    For the cancer in the bones causing pain.  For now she is taking oxycodone as needed for pain.  Overall her pain is better after receiving radiation.  She will continue the pain medication.  I will also refer her to palliative care.  When she was seen by neurosurgery she was told to wear Com[place] brace when out of bed and HOB >30 degrees.  She is asking when she can remove the brace.  I will have her see spinal specialist to address this concern.    Discussion regarding Zometa.  Because of metastatic cancer to the bones, I recommend Zometa to strengthen the bones.  We discussed the rational schedule and potential side effects of it.  I would like her to get a dental clearance prior to that.  She is taking calcium and vitamin D which she should continue to take.  I will plan to give her Zometa every 3 months.      Port-A-Cath.  She mentions that there was some problem  accessing the port while she was in the hospital and it stopped working.  We will see if it will work and whether it needs any adjustment.    Multiple sclerosis.  She will continue to follow with her neurologist Dr. Quiles.  Currently multiple sclerosis is under control and currently she is not taking any medications.    Discussion regarding healthcare directives.  She tells me that she has healthcare directive and she will bring it for our records.      I would like to see her back 3 weeks after starting Xeloda.    I answered all of her and her family's questions to their satisfaction.  They are agreeable and comfortable with the plan.    Dewayne Zepeda MD      Again, thank you for allowing me to participate in the care of your patient.        Sincerely,        Dewayne Zepeda MD

## 2019-09-20 ENCOUNTER — TELEPHONE (OUTPATIENT)
Dept: ONCOLOGY | Facility: CLINIC | Age: 59
End: 2019-09-20

## 2019-09-20 DIAGNOSIS — C50.919 METASTATIC BREAST CANCER: ICD-10-CM

## 2019-09-20 DIAGNOSIS — C50.911 BILATERAL MALIGNANT NEOPLASM OF BREAST IN FEMALE, UNSPECIFIED ESTROGEN RECEPTOR STATUS, UNSPECIFIED SITE OF BREAST (H): ICD-10-CM

## 2019-09-20 DIAGNOSIS — C50.912 BILATERAL MALIGNANT NEOPLASM OF BREAST IN FEMALE, UNSPECIFIED ESTROGEN RECEPTOR STATUS, UNSPECIFIED SITE OF BREAST (H): ICD-10-CM

## 2019-09-20 NOTE — TELEPHONE ENCOUNTER
9/20/2019    Returned patient's call in regards to determine if her VUS in MSH2 had been reclassified and to determine if there was any updated testing she could do. We discussed that this VUS has not been reclassified at this time. We discussed that there was not any updates to the family history at this time and that would not be any other genetic testing that I would recommend at this time based on her family history. She had not further questions at this time.    Juan Odell MS [facility]  Licensed Genetic Counselor

## 2019-09-24 ENCOUNTER — TELEPHONE (OUTPATIENT)
Dept: ONCOLOGY | Facility: CLINIC | Age: 59
End: 2019-09-24

## 2019-09-24 DIAGNOSIS — C50.919 METASTATIC BREAST CANCER: Primary | ICD-10-CM

## 2019-09-24 DIAGNOSIS — C50.911 BILATERAL MALIGNANT NEOPLASM OF BREAST IN FEMALE, UNSPECIFIED ESTROGEN RECEPTOR STATUS, UNSPECIFIED SITE OF BREAST (H): ICD-10-CM

## 2019-09-24 DIAGNOSIS — C50.912 BILATERAL MALIGNANT NEOPLASM OF BREAST IN FEMALE, UNSPECIFIED ESTROGEN RECEPTOR STATUS, UNSPECIFIED SITE OF BREAST (H): ICD-10-CM

## 2019-09-24 DIAGNOSIS — C50.919 METASTATIC BREAST CANCER: ICD-10-CM

## 2019-09-24 RX ORDER — PROCHLORPERAZINE MALEATE 10 MG
10 TABLET ORAL EVERY 6 HOURS PRN
Qty: 30 TABLET | Refills: 2 | Status: SHIPPED | OUTPATIENT
Start: 2019-09-24 | End: 2019-09-24

## 2019-09-24 RX ORDER — PROCHLORPERAZINE MALEATE 10 MG
10 TABLET ORAL EVERY 6 HOURS PRN
Qty: 385 TABLET | Refills: 0 | Status: SHIPPED | OUTPATIENT
Start: 2019-09-24 | End: 2019-10-23

## 2019-09-24 RX ORDER — CAPECITABINE 500 MG/1
TABLET, FILM COATED ORAL
Qty: 98 TABLET | Refills: 0 | Status: SHIPPED | OUTPATIENT
Start: 2019-09-24 | End: 2019-10-23

## 2019-09-24 RX ORDER — LORAZEPAM 0.5 MG/1
0.5 TABLET ORAL EVERY 4 HOURS PRN
Qty: 30 TABLET | Refills: 2 | Status: SHIPPED | OUTPATIENT
Start: 2019-09-24 | End: 2019-10-07

## 2019-09-24 NOTE — TELEPHONE ENCOUNTER
9/24/2019    Presenting Information:  I spoke to Shaneka Alvarez by phone today to discuss her genetic testing results.  Her blood was originally drawn on 10/12/2016 and OvaNext testing was ordered  from [facility]. This testing was done because of Shaneka's personal and family history of breast cancer. At that time, Shaneka was found to have a variant of unknown significance (VUS) in the MSH2 gene. This variant is called p.L128V.   No mutations were found in any of the other tested genes: GABRIELE, BARD1, BRCA1, BRCA2, BRIP1, CDH1, CHEK2, EPCAM, MLH1, MRE11A, MSH2, MSH6, MUTYH, NBN, NF1, PALB2, PMS2, PTEN, RAD50, RAD51C, RAD51D, SMARCA4, STK11, and TP53.  Please see progress note from 12/6/2016 for additional result details.      Discussion:  Recently [facility] contacted me with a new report.  The VUS in the MSH2 gene has been reclassified as Variant, Likely Benign.  This means that the MSH2 variant found in Shaneka is most likely NOT associated with Faye syndrome and an increased risk for cancer.  Shaneka should continue with cancer screening based on personal medical and family history, as previously discussed.    We reviewed screening recommendations for Shaneka's close relatives given the significant family history of breast cancer. Shaneka s close female relatives remain at increased risk for breast cancer given their family history. Breast cancer screening is generally recommended to begin approximately 10 years younger than the earliest age of breast cancer diagnosis in the family, or at age 40, whichever comes first. Breast screening options should be discussed with an individual's primary care provider and a genetic counselor, to determine at what age to begin screening, what screening is appropriate, and if additional screening (such as breast MRI) is necessary based on personal/family history factors.      We do not have an explanation for Shaneka's personal and family history of breast cancer.  Because of that, it is  important that she continue with cancer screening based on her personal and family history as discussed above, and that close family members discuss their family history and appropriate screening with their care providers.      Genetic testing is rapidly advancing, and new cancer susceptibility genes will most likely be identified in the future.  Therefore, I encouraged Shaneka to contact me annually or if there are changes in her personal or family history.  This may change how we assess her cancer risk, screening, and the testing we would offer.    Plan:   1) I will send Shaneka a results letter reflecting the change in classification and screening recommendations for her family.  2) If there are any further questions or concerns, she should not hesitate to contact me at [phone].    Nisha Llanes MS, [facility]  Licensed Genetic Counselor  [phone]

## 2019-09-24 NOTE — LETTER
Cancer Risk Management  Program [facility]  [facility]  [facility]  Mailing Address  Cancer Risk Management Program  [address]  [place] [address]    New patient appointments  [phone]  September 25, 2019    Shaneka Alvarez  [address] [facility] [address]      Dear Shaneka,    It was a pleasure speaking with you on the phone on 9/24/2019.  Here is a copy of the progress note from our discussion.  If you have any additional questions, please feel free to call.    Presenting Information:  I spoke to Shaneka Alvarez by phone today to discuss her genetic testing results.  Her blood was originally drawn on 10/12/2016 and OvaNext testing was ordered  from [facility]. This testing was done because of Shaneka's personal and family history of breast cancer. At that time, Shaneka was found to have a variant of unknown significance (VUS) in the MSH2 gene. This variant is called p.L128V.   No mutations were found in any of the other tested genes: GABRIELE, BARD1, BRCA1, BRCA2, BRIP1, CDH1, CHEK2, EPCAM, MLH1, MRE11A, MSH2, MSH6, MUTYH, NBN, NF1, PALB2, PMS2, PTEN, RAD50, RAD51C, RAD51D, SMARCA4, STK11, and TP53.      Discussion:  Recently [employer] contacted me with a new report.  The VUS in the MSH2 gene has been reclassified as Variant, Likely Benign.  This means that the MSH2 variant found in Shaneka is most likely NOT associated with Faye syndrome and an increased risk for cancer.  Shaneka should continue with cancer screening based on personal medical and family history, as previously discussed.    We reviewed screening recommendations for Shaneka's close relatives given the significant family history of breast cancer. Shaneka s close female relatives remain at increased risk for breast cancer given their family  history. Breast cancer screening is generally recommended to begin approximately 10 years younger than the earliest age of breast cancer diagnosis in the family, or at age 40, whichever comes first. Breast screening options should be discussed with an individual's primary care provider and a genetic counselor, to determine at what age to begin screening, what screening is appropriate, and if additional screening (such as breast MRI) is necessary based on personal/family history factors.      We do not have an explanation for Shaneka's personal and family history of breast cancer.  Because of that, it is important that she continue with cancer screening based on her personal and family history as discussed above, and that close family members discuss their family history and appropriate screening with their care providers.      Genetic testing is rapidly advancing, and new cancer susceptibility genes will most likely be identified in the future.  Therefore, I encouraged Shaneka to contact me annually or if there are changes in her personal or family history.  This may change how we assess her cancer risk, screening, and the testing we would offer.    Plan:   1) I will send Shaneka a results letter reflecting the change in classification and screening recommendations for her family.  2) If there are any further questions or concerns, she should not hesitate to contact me at [phone].    Nisha Llanes MS, [facility]  Licensed Genetic Counselor  [phone]

## 2019-09-24 NOTE — TELEPHONE ENCOUNTER
Received fax from [facility] stating that patient is requesting 90 day supply (Qty 385 tablets).     Previously written for Qty 30 + 2rf, sent to pharmacy today.

## 2019-09-25 ENCOUNTER — PATIENT OUTREACH (OUTPATIENT)
Dept: ONCOLOGY | Facility: CLINIC | Age: 59
End: 2019-09-25

## 2019-09-25 ENCOUNTER — TRANSFERRED RECORDS (OUTPATIENT)
Dept: HEALTH INFORMATION MANAGEMENT | Facility: CLINIC | Age: 59
End: 2019-09-25

## 2019-09-25 DIAGNOSIS — B37.0 THRUSH: Primary | ICD-10-CM

## 2019-09-25 RX ORDER — NYSTATIN 100000/ML
500000 SUSPENSION, ORAL (FINAL DOSE FORM) ORAL 4 TIMES DAILY
Qty: 300 ML | Refills: 0 | Status: SHIPPED | OUTPATIENT
Start: 2019-09-25 | End: 2020-01-07

## 2019-09-25 NOTE — PROGRESS NOTES
Informed patient that her dentist noted thrush in her throat; Dr. Zepeda would like her to start Nystatin; she was also given clearance to start Zometa (no caries or periodontal concerns noted) and advised she could have this infusion on 10/23, when she returns to the clinic.  She is understanding of the plan.

## 2019-09-26 ENCOUNTER — TELEPHONE (OUTPATIENT)
Dept: ONCOLOGY | Facility: CLINIC | Age: 59
End: 2019-09-26

## 2019-09-26 NOTE — TELEPHONE ENCOUNTER
Per order Dr Zepeda referred this patient to a spine specialist. [facility]  is going to call the patient to discuss  Setting up an appointment.

## 2019-10-03 LAB
BACTERIA SPEC CULT: NORMAL
FUNGUS SPEC CULT: NORMAL
SPECIMEN SOURCE: NORMAL
SPECIMEN SOURCE: NORMAL

## 2019-10-04 ENCOUNTER — TELEPHONE (OUTPATIENT)
Dept: ONCOLOGY | Facility: CLINIC | Age: 59
End: 2019-10-04

## 2019-10-06 NOTE — PROGRESS NOTES
Oncology Follow Up Visit: October 7, 2019    Oncologist: Dr Dewayne Zepeda  PCP: Mohit Barcenas    Diagnosis: Metastatic Breast Cancer to brain and spine  Shaneka Alvarez is a 60 yo female who was diagnosed with Stage IIB, T2 N1 M0 invasive lobular carcinoma of the right breast  In January 2006.  Final pathology showed a 4.5 x 3.8 x 2.5 cm, 01/14 lymph nodes positive.  Estrogen, progesterone receptor positive, HER-2 negative.     Genetics- BRCA1 and 2 mutations not detected. 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  There was no evidence of new multiple sclerosis lesions.  PET scan on 8/30/2019 which showed widespread bone metastatic lesions (calvarium, spine, sacrum, pelvis, ribs most prominent at C7, T3, L1 and L4), hypermetabolic 3 cm lesion in LLL, and mediastinal/hilar LN. CEA wnl at 1.9 and C27-29 elevated to 415 (28 on 8/23/16). A CT guided biopsy of the right iliac bone was obtained on 9/4/19, pathology consistent with metastatic adenocarcinoma (breast), ER/ND negative, HER-2 negative PDL 1 < 1%. A second biopsy was take of the LLL nodule via EBUS on 9/5/19 did not show any malignancy.  C/T/L spine MRI 8/3/19 to 9/2/19 with numerous enhancing lesions of the C, T and L spine, largest around T9 and L1. No evidence of cord compression. Has a L1 compression fracture and impending L4. completed radiation therapy to T12-L5.-Neurosurgery recommended no surgical intervention but to wear [place] brace when out of bed and HOB >30 degrees  Treatment:   2006:  ECOG 2104 protocol of dose-dense Adriamycin, Cytoxan and Avastin x4 cycles followed by Taxol and Avastin x4 cycles.   03/2007:  Completed 1 year Avastin.   11/2006-01/2007:  Radiotherapy to the right breast of 5040 cGy.   [id number]:  Aromasin.  Stopped after moving to the [place].   01/2016:  Right  modified radical mastectomy with latissimus dorsi flap reconstruction and a left prophylactic mastectomy with latissimus dorsi flap reconstruction.   9/6/2019 till 9/18/2019 Received radiation T12-L5 3000 cGy in 10 fractions   -Neurosurgery recommended no surgical intervention but to wear [place] brace when out of bed and HOB >30 degrees  9/18/2019 completed T12-L5 radiation in 10 fractions =3000 cGy    Interval History: Ms. Alvarez comes to clinic with  for symptom review after starting on Xeloda for her metastatic breast cancer. Pt began her prescribed Xeloda at 2000 mg each morning and 1500 mg in evening with food and completed 3 doses when found nausea, red eyes,  Blurred vision, skull pain and loss of appetite with weight loss. Since she had a significant reaction previously to bactrim and was in ICU for several weeks previously she and  stopped treatment after 5 doses and is in today for review of symptoms. Pt shares that she felt better and better after getting off the Xeloda and had a great day yesterday and is back to eating with an appetite-taking fluids well and was able to get out to go to [employer]. She is still seeing red eyes with some blurring of vision yet today. Also has noted her walking ability and stamina has decreased significantly over last 1.5-2 weeks. Pt denies additional pain but continues on oxycodone and use of lorazepam for help with sleep. Has not eaten yet today but normally does not eat in mornings. Denies SOB, chest pain, bowel or bladder issues, skin changes or fevers.  Rest of comprehensive and complete ROS is reviewed and is negative.   Past Medical History:   Diagnosis Date     Breast cancer (H)     Age 44     Common migraine      H/O bilateral mastectomy      Mild major depression (H)      Multiple sclerosis (H)      Current Outpatient Medications   Medication     acetaminophen (TYLENOL) 500 MG tablet     busPIRone (BUSPAR) 15 MG tablet     calcium  "citrate-vitamin D (CITRACAL) 315-250 MG-UNIT TABS     capecitabine (XELODA) 500 MG tablet CHEMO     Cholecalciferol (VITAMIN D3 PO)     dexamethasone (DECADRON) 4 MG tablet     diclofenac (VOLTAREN) 1 % topical gel     erenumab-aooe (AIMOVIG 140 DOSE) 70 MG/ML injection     Lidocaine (LIDOCARE) 4 % Patch     LORazepam (ATIVAN) 0.5 MG tablet     LORazepam (ATIVAN) 0.5 MG tablet     magnesium 250 MG tablet     ondansetron (ZOFRAN-ODT) 8 MG ODT tab     oxyCODONE IR (ROXICODONE) 5 MG tablet     polyethylene glycol (MIRALAX/GLYCOLAX) packet     prochlorperazine (COMPAZINE) 10 MG tablet     prochlorperazine (COMPAZINE) 10 MG tablet     propranolol (INDERAL LA) 60 MG 24 hr capsule     ranitidine (ZANTAC) 150 MG tablet     senna-docusate (SENOKOT-S/PERICOLACE) 8.6-50 MG tablet     syringe/needle, disp, (B-D INTEGRA SYRINGE) 23G X 1\" 3 ML MISC     tolterodine ER (DETROL LA) 4 MG 24 hr capsule     venlafaxine (EFFEXOR-ER) 225 MG TB24 24 hr tablet     No current facility-administered medications for this visit.      Allergies   Allergen Reactions     Bactrim [Sulfamethoxazole W/Trimethoprim]      Internal bleeding     Copaxone [Glatiramer] Hives       Physical Exam:BP (!) 130/90 (BP Location: Left arm)   Pulse 96   Temp 97.6  F (36.4  C) (Oral)   Resp 16   Ht 1.575 m (5' 2.01\")   Wt 68.3 kg (150 lb 8 oz)   SpO2 97%   BMI 27.52 kg/m     ECOG PS- 1  Constitutional: Alert, moving well with brace and in no distress.   ENT: Eyes with redness through sclera and conjunctiva bilaterally, No mouth sores- no obvious thrush noted.   Neck: Supple, No adenopathy.  Cardiac: Heart rate and rhythm is regular and strong without murmur  Respiratory: Breathing easy. Lung sounds clear to auscultation  GI: Abdomen is soft, non-tender, BS normal. No masses or organomegaly  MS: Muscle tone normal- appears to be walking well in short distances without assistance and was able to get to exam table without assistance, extremities normal with no " edema- pt states she has lost weight in legs most of all.   Skin: No suspicious lesions or rashes- no changes to palms or bottoms of feet after very short drug use.  Neuro: Sensory grossly WNL, gait normal.   Lymph: Normal ant/post cervical, axillary, supraclavicular nodes  Psych: Mentation appears normal and affect normal/bright with good conversation today    Laboratory Results:    Ref. Range 10/7/2019 09:30   Sodium Latest Ref Range: 133 - 144 mmol/L 129 (L)   Potassium Latest Ref Range: 3.4 - 5.3 mmol/L 3.4   Chloride Latest Ref Range: 94 - 109 mmol/L 95   Carbon Dioxide Latest Ref Range: 20 - 32 mmol/L 28   Urea Nitrogen Latest Ref Range: 7 - 30 mg/dL 5 (L)   Creatinine Latest Ref Range: 0.52 - 1.04 mg/dL 0.69   GFR Estimate Latest Ref Range: >60 mL/min/1.73_m2 >90   GFR Estimate If Black Latest Ref Range: >60 mL/min/1.73_m2 >90   Calcium Latest Ref Range: 8.5 - 10.1 mg/dL 8.9   Anion Gap Latest Ref Range: 3 - 14 mmol/L 6   Albumin Latest Ref Range: 3.4 - 5.0 g/dL 3.1 (L)   Protein Total Latest Ref Range: 6.8 - 8.8 g/dL 7.7   Bilirubin Total Latest Ref Range: 0.2 - 1.3 mg/dL 0.6   Alkaline Phosphatase Latest Ref Range: 40 - 150 U/L 207 (H)   ALT Latest Ref Range: 0 - 50 U/L 71 (H)   AST Latest Ref Range: 0 - 45 U/L 28   Glucose Latest Ref Range: 70 - 99 mg/dL 155 (H)   WBC Latest Ref Range: 4.0 - 11.0 10e9/L 6.3   Hemoglobin Latest Ref Range: 11.7 - 15.7 g/dL 14.1   Hematocrit Latest Ref Range: 35.0 - 47.0 % 40.9   Platelet Count Latest Ref Range: 150 - 450 10e9/L 403   RBC Count Latest Ref Range: 3.8 - 5.2 10e12/L 4.82   MCV Latest Ref Range: 78 - 100 fl 85   MCH Latest Ref Range: 26.5 - 33.0 pg 29.3   MCHC Latest Ref Range: 31.5 - 36.5 g/dL 34.5   RDW Latest Ref Range: 10.0 - 15.0 % 13.7   Diff Method Unknown Automated Method   % Neutrophils Latest Units: % 68.5   % Lymphocytes Latest Units: % 5.1   % Monocytes Latest Units: % 19.7   % Eosinophils Latest Units: % 4.6   % Basophils Latest Units: % 0.5   %  Immature Granulocytes Latest Units: % 1.6   Absolute Neutrophil Latest Ref Range: 1.6 - 8.3 10e9/L 4.3   Absolute Lymphocytes Latest Ref Range: 0.8 - 5.3 10e9/L 0.3 (L)   Absolute Monocytes Latest Ref Range: 0.0 - 1.3 10e9/L 1.3   Absolute Eosinophils Latest Ref Range: 0.0 - 0.7 10e9/L 0.3   Absolute Basophils Latest Ref Range: 0.0 - 0.2 10e9/L 0.0   Abs Immature Granulocytes Latest Ref Range: 0 - 0.4 10e9/L 0.1       Assessment and Plan:   Metastatic Breast Cancer to brain and spine-Pt comes to clinic due to symptoms that started as soon as she began the oral Xeloda at 2000 mg in morning and 1500 mg in evening and had taken 5 doses of the medication with symptoms of nausea, loss of appetite, red eyes, blurred vision, skull pain with significant loss of weight. Symptoms are mostly resolved at this time with red eyes continuing but vision is much improved.   After review with pharmacy, we will restart Xeloda today with 1000 mg BID discussing to use with food and antiemetics. She will continue out 14 days from today and then have 7 days off treatment and see Dr Zepeda on week off as set on 10/23. We can raise dosing of medication in future if side effects controlled with new dose.   - will renew lorazepam for pt to use for nausea and sleep- discussed she may repeat lorazepam dosing at night if not sleeping after 1 hour.   Bone mets- Pt completed radiation therapy to T12-L5 on 9/18. She is seeing decreased stamina recently but pain is not changing and has no new radicular pain. Pt shares insurance has called her and states they are requiring her to see non surgical spine specialist- we will try to help her with this- Antonietta to help set up with orthopedic provider  - referral placed.  Hyponatremia- NA+=129- pt is taking fluids well and may be washing away sodium. Will ask to use gatorade as part of fluid intake and will improving nutrition should return to normal shortly.  Back pain- with known bone metastasis- has seen  palliative care and is currently on tylenol 3 x daily, Lidocaine gel to back and oxycodone 5-10 mg every 4 hours- will renew the oxycodone for now since increased pain and using more often.  Weight loss from recent week should improve with dose adjustment but reviewed protein sources and smaller meals more often.  MS - currently not on treatment for her MS- follows with Dr Quiles in Neurology  * pt did present Health care directives since last visit which are in chart.  This was a 30 min visit with > 50% in counseling and coordinating care including education and management of concerns.    Annie Bailon,CNP

## 2019-10-07 ENCOUNTER — ONCOLOGY VISIT (OUTPATIENT)
Dept: ONCOLOGY | Facility: CLINIC | Age: 59
End: 2019-10-07
Payer: COMMERCIAL

## 2019-10-07 ENCOUNTER — DOCUMENTATION ONLY (OUTPATIENT)
Dept: ONCOLOGY | Facility: CLINIC | Age: 59
End: 2019-10-07

## 2019-10-07 VITALS
HEART RATE: 96 BPM | TEMPERATURE: 97.6 F | OXYGEN SATURATION: 97 % | DIASTOLIC BLOOD PRESSURE: 90 MMHG | RESPIRATION RATE: 16 BRPM | BODY MASS INDEX: 27.7 KG/M2 | HEIGHT: 62 IN | WEIGHT: 150.5 LBS | SYSTOLIC BLOOD PRESSURE: 130 MMHG

## 2019-10-07 DIAGNOSIS — C79.51 CARCINOMA OF BREAST METASTATIC TO BONE, UNSPECIFIED LATERALITY (H): ICD-10-CM

## 2019-10-07 DIAGNOSIS — C79.51 METASTATIC CANCER TO SPINE (H): Primary | ICD-10-CM

## 2019-10-07 DIAGNOSIS — C50.919 METASTATIC BREAST CANCER: ICD-10-CM

## 2019-10-07 DIAGNOSIS — C50.919 CARCINOMA OF BREAST METASTATIC TO BONE, UNSPECIFIED LATERALITY (H): ICD-10-CM

## 2019-10-07 DIAGNOSIS — C50.911 BILATERAL MALIGNANT NEOPLASM OF BREAST IN FEMALE, UNSPECIFIED ESTROGEN RECEPTOR STATUS, UNSPECIFIED SITE OF BREAST (H): ICD-10-CM

## 2019-10-07 DIAGNOSIS — C50.912 BILATERAL MALIGNANT NEOPLASM OF BREAST IN FEMALE, UNSPECIFIED ESTROGEN RECEPTOR STATUS, UNSPECIFIED SITE OF BREAST (H): ICD-10-CM

## 2019-10-07 PROCEDURE — 99214 OFFICE O/P EST MOD 30 MIN: CPT | Performed by: NURSE PRACTITIONER

## 2019-10-07 RX ORDER — LORAZEPAM 0.5 MG/1
0.5 TABLET ORAL EVERY 4 HOURS PRN
Qty: 30 TABLET | Refills: 2 | Status: SHIPPED | OUTPATIENT
Start: 2019-10-07 | End: 2019-10-23

## 2019-10-07 RX ORDER — OXYCODONE HYDROCHLORIDE 5 MG/1
5-10 TABLET ORAL EVERY 4 HOURS PRN
Qty: 100 TABLET | Refills: 0 | Status: SHIPPED | OUTPATIENT
Start: 2019-10-07 | End: 2019-11-05

## 2019-10-07 ASSESSMENT — MIFFLIN-ST. JEOR: SCORE: 1221.04

## 2019-10-07 ASSESSMENT — PAIN SCALES - GENERAL: PAINLEVEL: SEVERE PAIN (7)

## 2019-10-07 NOTE — LETTER
10/7/2019         RE: Shaneka Alvarez  [address] [address] [facility] [id number]        Dear Colleague,    Thank you for referring your patient, Shaneka Alvarez, to the [facility]. Please see a copy of my visit note below.    Oncology Follow Up Visit: October 7, 2019    Oncologist: Dr Dewayne Zepeda  PCP: Mohit Barcenas    Diagnosis: Metastatic Breast Cancer to brain and spine  Shaneka Alvarez is a 60 yo female who was diagnosed with Stage IIB, T2 N1 M0 invasive lobular carcinoma of the right breast  In January 2006.  Final pathology showed a 4.5 x 3.8 x 2.5 cm, 01/14 lymph nodes positive.  Estrogen, progesterone receptor positive, HER-2 negative.     Genetics- BRCA1 and 2 mutations not detected. 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  There was no evidence of new multiple sclerosis lesions.  PET scan on 8/30/2019 which showed widespread bone metastatic lesions (calvarium, spine, sacrum, pelvis, ribs most prominent at C7, T3, L1 and L4), hypermetabolic 3 cm lesion in LLL, and mediastinal/hilar LN. CEA wnl at 1.9 and C27-29 elevated to 415 (28 on 8/23/16). A CT guided biopsy of the right iliac bone was obtained on 9/4/19, pathology consistent with metastatic adenocarcinoma (breast), ER/CT negative, HER-2 negative PDL 1 < 1%. A second biopsy was take of the LLL nodule via EBUS on 9/5/19 did not show any malignancy.  C/T/L spine MRI 8/3/19 to 9/2/19 with numerous enhancing lesions of the C, T and L spine, largest around T9 and L1. No evidence of cord compression. Has a L1 compression fracture and impending L4.  completed radiation therapy to T12-L5.-Neurosurgery recommended no surgical intervention but to wear [place]orge brace when out of bed and HOB >30 degrees  Treatment:   2006:  ECOG 2104 protocol of dose-dense Adriamycin, Cytoxan and Avastin  x4 cycles followed by Taxol and Avastin x4 cycles.   03/2007:  Completed 1 year Avastin.   11/2006-01/2007:  Radiotherapy to the right breast of 5040 cGy.   [id number]:  Aromasin.  Stopped after moving to the [facility].   01/2016:  Right modified radical mastectomy with latissimus dorsi flap reconstruction and a left prophylactic mastectomy with latissimus dorsi flap reconstruction.   9/6/2019 till 9/18/2019 Received radiation T12-L5 3000 cGy in 10 fractions   -Neurosurgery recommended no surgical intervention but to wear [place] brace when out of bed and HOB >30 degrees  9/18/2019 completed T12-L5 radiation in 10 fractions =3000 cGy    Interval History: Ms. Alvarez comes to clinic with  for symptom review after starting on Xeloda for her metastatic breast cancer. Pt began her prescribed Xeloda at 2000 mg each morning and 1500 mg in evening with food and completed 3 doses when found nausea, red eyes,  Blurred vision, skull pain and loss of appetite with weight loss. Since she had a significant reaction previously to bactrim and was in ICU for several weeks previously she and  stopped treatment after 5 doses and is in today for review of symptoms. Pt shares that she felt better and better after getting off the Xeloda and had a great day yesterday and is back to eating with an appetite-taking fluids well and was able to get out to go to [employer]. She is still seeing red eyes with some blurring of vision yet today. Also has noted her walking ability and stamina has decreased significantly over last 1.5-2 weeks. Pt denies additional pain but continues on oxycodone and use of lorazepam for help with sleep. Has not eaten yet today but normally does not eat in mornings. Denies SOB, chest pain, bowel or bladder issues, skin changes or fevers.  Rest of comprehensive and complete ROS is reviewed and is negative.   Past Medical History:   Diagnosis Date     Breast cancer (H)     Age 44     Common  "migraine      H/O bilateral mastectomy      Mild major depression (H)      Multiple sclerosis (H)      Current Outpatient Medications   Medication     acetaminophen (TYLENOL) 500 MG tablet     busPIRone (BUSPAR) 15 MG tablet     calcium citrate-vitamin D (CITRACAL) 315-250 MG-UNIT TABS     capecitabine (XELODA) 500 MG tablet CHEMO     Cholecalciferol (VITAMIN D3 PO)     dexamethasone (DECADRON) 4 MG tablet     diclofenac (VOLTAREN) 1 % topical gel     erenumab-aooe (AIMOVIG 140 DOSE) 70 MG/ML injection     Lidocaine (LIDOCARE) 4 % Patch     LORazepam (ATIVAN) 0.5 MG tablet     LORazepam (ATIVAN) 0.5 MG tablet     magnesium 250 MG tablet     ondansetron (ZOFRAN-ODT) 8 MG ODT tab     oxyCODONE IR (ROXICODONE) 5 MG tablet     polyethylene glycol (MIRALAX/GLYCOLAX) packet     prochlorperazine (COMPAZINE) 10 MG tablet     prochlorperazine (COMPAZINE) 10 MG tablet     propranolol (INDERAL LA) 60 MG 24 hr capsule     ranitidine (ZANTAC) 150 MG tablet     senna-docusate (SENOKOT-S/PERICOLACE) 8.6-50 MG tablet     syringe/needle, disp, (B-D INTEGRA SYRINGE) 23G X 1\" 3 ML MISC     tolterodine ER (DETROL LA) 4 MG 24 hr capsule     venlafaxine (EFFEXOR-ER) 225 MG TB24 24 hr tablet     No current facility-administered medications for this visit.      Allergies   Allergen Reactions     Bactrim [Sulfamethoxazole W/Trimethoprim]      Internal bleeding     Copaxone [Glatiramer] Hives       Physical Exam:BP (!) 130/90 (BP Location: Left arm)   Pulse 96   Temp 97.6  F (36.4  C) (Oral)   Resp 16   Ht 1.575 m (5' 2.01\")   Wt 68.3 kg (150 lb 8 oz)   SpO2 97%   BMI 27.52 kg/m      ECOG PS- 1  Constitutional: Alert, moving well with brace and in no distress.   ENT: Eyes with redness through sclera and conjunctiva bilaterally, No mouth sores- no obvious thrush noted.   Neck: Supple, No adenopathy.  Cardiac: Heart rate and rhythm is regular and strong without murmur  Respiratory: Breathing easy. Lung sounds clear to auscultation  GI: " Abdomen is soft, non-tender, BS normal. No masses or organomegaly  MS: Muscle tone normal- appears to be walking well in short distances without assistance and was able to get to exam table without assistance, extremities normal with no edema- pt states she has lost weight in legs most of all.   Skin: No suspicious lesions or rashes- no changes to palms or bottoms of feet after very short drug use.  Neuro: Sensory grossly WNL, gait normal.   Lymph: Normal ant/post cervical, axillary, supraclavicular nodes  Psych: Mentation appears normal and affect normal/bright with good conversation today    Laboratory Results:    Ref. Range 10/7/2019 09:30   Sodium Latest Ref Range: 133 - 144 mmol/L 129 (L)   Potassium Latest Ref Range: 3.4 - 5.3 mmol/L 3.4   Chloride Latest Ref Range: 94 - 109 mmol/L 95   Carbon Dioxide Latest Ref Range: 20 - 32 mmol/L 28   Urea Nitrogen Latest Ref Range: 7 - 30 mg/dL 5 (L)   Creatinine Latest Ref Range: 0.52 - 1.04 mg/dL 0.69   GFR Estimate Latest Ref Range: >60 mL/min/1.73_m2 >90   GFR Estimate If Black Latest Ref Range: >60 mL/min/1.73_m2 >90   Calcium Latest Ref Range: 8.5 - 10.1 mg/dL 8.9   Anion Gap Latest Ref Range: 3 - 14 mmol/L 6   Albumin Latest Ref Range: 3.4 - 5.0 g/dL 3.1 (L)   Protein Total Latest Ref Range: 6.8 - 8.8 g/dL 7.7   Bilirubin Total Latest Ref Range: 0.2 - 1.3 mg/dL 0.6   Alkaline Phosphatase Latest Ref Range: 40 - 150 U/L 207 (H)   ALT Latest Ref Range: 0 - 50 U/L 71 (H)   AST Latest Ref Range: 0 - 45 U/L 28   Glucose Latest Ref Range: 70 - 99 mg/dL 155 (H)   WBC Latest Ref Range: 4.0 - 11.0 10e9/L 6.3   Hemoglobin Latest Ref Range: 11.7 - 15.7 g/dL 14.1   Hematocrit Latest Ref Range: 35.0 - 47.0 % 40.9   Platelet Count Latest Ref Range: 150 - 450 10e9/L 403   RBC Count Latest Ref Range: 3.8 - 5.2 10e12/L 4.82   MCV Latest Ref Range: 78 - 100 fl 85   MCH Latest Ref Range: 26.5 - 33.0 pg 29.3   MCHC Latest Ref Range: 31.5 - 36.5 g/dL 34.5   RDW Latest Ref Range: 10.0 -  15.0 % 13.7   Diff Method Unknown Automated Method   % Neutrophils Latest Units: % 68.5   % Lymphocytes Latest Units: % 5.1   % Monocytes Latest Units: % 19.7   % Eosinophils Latest Units: % 4.6   % Basophils Latest Units: % 0.5   % Immature Granulocytes Latest Units: % 1.6   Absolute Neutrophil Latest Ref Range: 1.6 - 8.3 10e9/L 4.3   Absolute Lymphocytes Latest Ref Range: 0.8 - 5.3 10e9/L 0.3 (L)   Absolute Monocytes Latest Ref Range: 0.0 - 1.3 10e9/L 1.3   Absolute Eosinophils Latest Ref Range: 0.0 - 0.7 10e9/L 0.3   Absolute Basophils Latest Ref Range: 0.0 - 0.2 10e9/L 0.0   Abs Immature Granulocytes Latest Ref Range: 0 - 0.4 10e9/L 0.1       Assessment and Plan:   Metastatic Breast Cancer to brain and spine-Pt comes to clinic due to symptoms that started as soon as she began the oral Xeloda at 2000 mg in morning and 1500 mg in evening and had taken 5 doses of the medication with symptoms of nausea, loss of appetite, red eyes, blurred vision, skull pain with significant loss of weight. Symptoms are mostly resolved at this time with red eyes continuing but vision is much improved.   After review with pharmacy, we will restart Xeloda today with 1000 mg BID discussing to use with food and antiemetics. She will continue out 14 days from today and then have 7 days off treatment and see Dr Zepeda on week off as set on 10/23. We can raise dosing of medication in future if side effects controlled with new dose.   - will renew lorazepam for pt to use for nausea and sleep- discussed she may repeat lorazepam dosing at night if not sleeping after 1 hour.   Bone mets- Pt completed radiation therapy to T12-L5 on 9/18. She is seeing decreased stamina recently but pain is not changing and has no new radicular pain. Pt shares insurance has called her and states they are requiring her to see non surgical spine specialist- we will try to help her with this- Antonietta to help set up with orthopedic provider  - referral  placed.  Hyponatremia- NA+=129- pt is taking fluids well and may be washing away sodium. Will ask to use gatorade as part of fluid intake and will improving nutrition should return to normal shortly.  Back pain- with known bone metastasis- has seen palliative care and is currently on tylenol 3 x daily, Lidocaine gel to back and oxycodone 5-10 mg every 4 hours- will renew the oxycodone for now since increased pain and using more often.  Weight loss from recent week should improve with dose adjustment but reviewed protein sources and smaller meals more often.  MS - currently not on treatment for her MS- follows with Dr Quiles in Neurology  * pt did present Health care directives since last visit which are in chart.  This was a 30 min visit with > 50% in counseling and coordinating care including education and management of concerns.    Annie Bailon CNP      Again, thank you for allowing me to participate in the care of your patient.        Sincerely,        Annie Bailon, TAY, APRN CNP

## 2019-10-07 NOTE — PROGRESS NOTES
Oral Chemotherapy Monitoring Program    Saw patient and her  during visit with Annie. Shaneka started capecitabine at a dose of 2000 mg in the morning and 1500 mg in the evening on 10/1. She took a total of 5 doses and then stopped due to side effects of eye irritation, skull pain, nausea and vomiting. She takes anti-nausea medications around the clock and takes her capecitabine after a meal.     Labs were drawn today and were unremarkable with the exception of a low sodium level of 129. Annie and the patient agreed to restart the capecitabine but at a much lower dose of 1000 mg BID to see if she can tolerate it.     Will follow-up with patient in one week to see how she is tolerating the lower dose.     Idalia Gonzalez, Pharm. D., BCOP

## 2019-10-07 NOTE — NURSING NOTE
"Oncology Rooming Note    October 7, 2019 8:47 AM   Shaneka Alvarez is a 59 year old female who presents for:    Chief Complaint   Patient presents with     Oncology Clinic Visit     Follow up     Initial Vitals: BP (!) 130/90 (BP Location: Left arm)   Pulse 96   Temp 97.6  F (36.4  C) (Oral)   Resp 16   Ht 1.575 m (5' 2.01\")   Wt 68.3 kg (150 lb 8 oz)   SpO2 97%   BMI 27.52 kg/m   Estimated body mass index is 27.52 kg/m  as calculated from the following:    Height as of this encounter: 1.575 m (5' 2.01\").    Weight as of this encounter: 68.3 kg (150 lb 8 oz). Body surface area is 1.73 meters squared.  Severe Pain (7) Comment: Data Unavailable   No LMP recorded. Patient is postmenopausal.  Allergies reviewed: Yes  Medications reviewed: Yes    Medications: MEDICATION REFILLS NEEDED TODAY. Provider was notified.  Pharmacy name entered into [facility]:    [id number] - [place], [place] - [address] [id number] [place], [place] - [address] [facility] [address]  [facility] - [place], [place] - [phone]              "

## 2019-10-15 ENCOUNTER — TELEPHONE (OUTPATIENT)
Dept: ONCOLOGY | Facility: CLINIC | Age: 59
End: 2019-10-15

## 2019-10-15 NOTE — TELEPHONE ENCOUNTER
Oral Chemotherapy service    S/B: Placed phone call to patient after Xeloda dose decrease a week ago to 1000mg BID from 2000mg/1500mg. Patient stated that her symptoms of headache, and eye irritation have improved, although she still has some blurry vision. This may have other etiologies including dehydration as N/V has not improved for the patient. She admits to emesis 3-4x daily with little to no relief from Compazine/Zofran at regular intervals. She has poor oral intake/apetite stating that some days she is able to eat something and some days she does not eat. She states she does still sip water regularly throughout the day and has no issue with that. However, she also admits her first bout of diarrhea today contributing to loss. Patient states she has been fighting through taking the medication, but is miserable on a daily basis.     A/R: Encouraged use of anti-emetics, oral intake with anything able/apealing, use of Imodium prn, hydration throughout the day. Patient is starting her week off today. Discussed with the patient to continue to hold treatment until 10/23 when she sees Dr. Zepeda in clinic (1+ extra day off therapy). Recommend dose decrease to 1000mg/500mg or 500mg BID, if not a regimen change.     Matt Soriano, PharmD  October 15, 2019

## 2019-10-17 ENCOUNTER — PATIENT OUTREACH (OUTPATIENT)
Dept: ONCOLOGY | Facility: CLINIC | Age: 59
End: 2019-10-17

## 2019-10-17 NOTE — PROGRESS NOTES
FMLA form completed for daughter, Nemesio, and faxed to [employer] at [phone].  FAX confirmed as received.  Nemesio notified.

## 2019-10-22 DIAGNOSIS — C79.51 BONE METASTASES: ICD-10-CM

## 2019-10-22 DIAGNOSIS — C50.919 METASTATIC BREAST CANCER: ICD-10-CM

## 2019-10-22 RX ORDER — ZOLEDRONIC ACID 0.04 MG/ML
4 INJECTION, SOLUTION INTRAVENOUS ONCE
Status: CANCELLED | OUTPATIENT
Start: 2019-10-23

## 2019-10-23 ENCOUNTER — DOCUMENTATION ONLY (OUTPATIENT)
Dept: ONCOLOGY | Facility: CLINIC | Age: 59
End: 2019-10-23

## 2019-10-23 ENCOUNTER — ONCOLOGY VISIT (OUTPATIENT)
Dept: ONCOLOGY | Facility: CLINIC | Age: 59
End: 2019-10-23
Payer: COMMERCIAL

## 2019-10-23 ENCOUNTER — INFUSION THERAPY VISIT (OUTPATIENT)
Dept: INFUSION THERAPY | Facility: CLINIC | Age: 59
End: 2019-10-23
Payer: COMMERCIAL

## 2019-10-23 ENCOUNTER — PATIENT OUTREACH (OUTPATIENT)
Dept: ONCOLOGY | Facility: CLINIC | Age: 59
End: 2019-10-23

## 2019-10-23 VITALS
WEIGHT: 150.3 LBS | RESPIRATION RATE: 16 BRPM | HEIGHT: 62 IN | OXYGEN SATURATION: 96 % | TEMPERATURE: 98.1 F | DIASTOLIC BLOOD PRESSURE: 88 MMHG | SYSTOLIC BLOOD PRESSURE: 116 MMHG | BODY MASS INDEX: 27.66 KG/M2 | HEART RATE: 78 BPM

## 2019-10-23 DIAGNOSIS — G47.00 INSOMNIA, UNSPECIFIED TYPE: Primary | ICD-10-CM

## 2019-10-23 DIAGNOSIS — C50.912 BILATERAL MALIGNANT NEOPLASM OF BREAST IN FEMALE, UNSPECIFIED ESTROGEN RECEPTOR STATUS, UNSPECIFIED SITE OF BREAST (H): ICD-10-CM

## 2019-10-23 DIAGNOSIS — C50.919 METASTATIC BREAST CANCER: ICD-10-CM

## 2019-10-23 DIAGNOSIS — H53.8 BLURRED VISION: ICD-10-CM

## 2019-10-23 DIAGNOSIS — C79.51 BONE METASTASES: Primary | ICD-10-CM

## 2019-10-23 DIAGNOSIS — C79.51 BONE METASTASES: ICD-10-CM

## 2019-10-23 DIAGNOSIS — C50.911 BILATERAL MALIGNANT NEOPLASM OF BREAST IN FEMALE, UNSPECIFIED ESTROGEN RECEPTOR STATUS, UNSPECIFIED SITE OF BREAST (H): ICD-10-CM

## 2019-10-23 LAB
ALBUMIN SERPL-MCNC: 3.2 G/DL (ref 3.4–5)
ALP SERPL-CCNC: 151 U/L (ref 40–150)
ALT SERPL W P-5'-P-CCNC: 30 U/L (ref 0–50)
ANION GAP SERPL CALCULATED.3IONS-SCNC: 3 MMOL/L (ref 3–14)
AST SERPL W P-5'-P-CCNC: 22 U/L (ref 0–45)
BASOPHILS # BLD AUTO: 0 10E9/L (ref 0–0.2)
BASOPHILS NFR BLD AUTO: 0.8 %
BILIRUB SERPL-MCNC: 0.4 MG/DL (ref 0.2–1.3)
BUN SERPL-MCNC: 4 MG/DL (ref 7–30)
CALCIUM SERPL-MCNC: 9 MG/DL (ref 8.5–10.1)
CHLORIDE SERPL-SCNC: 101 MMOL/L (ref 94–109)
CO2 SERPL-SCNC: 29 MMOL/L (ref 20–32)
CREAT SERPL-MCNC: 0.54 MG/DL (ref 0.52–1.04)
DIFFERENTIAL METHOD BLD: ABNORMAL
EOSINOPHIL # BLD AUTO: 0.1 10E9/L (ref 0–0.7)
EOSINOPHIL NFR BLD AUTO: 2.8 %
ERYTHROCYTE [DISTWIDTH] IN BLOOD BY AUTOMATED COUNT: 15.9 % (ref 10–15)
GFR SERPL CREATININE-BSD FRML MDRD: >90 ML/MIN/{1.73_M2}
GLUCOSE SERPL-MCNC: 103 MG/DL (ref 70–99)
HCT VFR BLD AUTO: 39 % (ref 35–47)
HGB BLD-MCNC: 13 G/DL (ref 11.7–15.7)
IMM GRANULOCYTES # BLD: 0.1 10E9/L (ref 0–0.4)
IMM GRANULOCYTES NFR BLD: 1.2 %
LYMPHOCYTES # BLD AUTO: 0.3 10E9/L (ref 0.8–5.3)
LYMPHOCYTES NFR BLD AUTO: 6.4 %
MCH RBC QN AUTO: 29.5 PG (ref 26.5–33)
MCHC RBC AUTO-ENTMCNC: 33.3 G/DL (ref 31.5–36.5)
MCV RBC AUTO: 89 FL (ref 78–100)
MONOCYTES # BLD AUTO: 0.8 10E9/L (ref 0–1.3)
MONOCYTES NFR BLD AUTO: 15 %
NEUTROPHILS # BLD AUTO: 3.7 10E9/L (ref 1.6–8.3)
NEUTROPHILS NFR BLD AUTO: 73.8 %
PLATELET # BLD AUTO: 189 10E9/L (ref 150–450)
POTASSIUM SERPL-SCNC: 3.7 MMOL/L (ref 3.4–5.3)
PROT SERPL-MCNC: 7.4 G/DL (ref 6.8–8.8)
RBC # BLD AUTO: 4.4 10E12/L (ref 3.8–5.2)
SODIUM SERPL-SCNC: 133 MMOL/L (ref 133–144)
WBC # BLD AUTO: 5 10E9/L (ref 4–11)

## 2019-10-23 PROCEDURE — 99207 ZZC NO CHARGE NURSE ONLY: CPT

## 2019-10-23 PROCEDURE — 99214 OFFICE O/P EST MOD 30 MIN: CPT | Mod: 25 | Performed by: INTERNAL MEDICINE

## 2019-10-23 PROCEDURE — 80053 COMPREHEN METABOLIC PANEL: CPT | Performed by: INTERNAL MEDICINE

## 2019-10-23 PROCEDURE — 85025 COMPLETE CBC W/AUTO DIFF WBC: CPT | Performed by: INTERNAL MEDICINE

## 2019-10-23 PROCEDURE — 96365 THER/PROPH/DIAG IV INF INIT: CPT | Performed by: INTERNAL MEDICINE

## 2019-10-23 RX ORDER — HEPARIN SODIUM (PORCINE) LOCK FLUSH IV SOLN 100 UNIT/ML 100 UNIT/ML
5 SOLUTION INTRAVENOUS
Status: DISCONTINUED | OUTPATIENT
Start: 2019-10-23 | End: 2019-10-23 | Stop reason: HOSPADM

## 2019-10-23 RX ORDER — SODIUM CHLORIDE 9 MG/ML
1000 INJECTION, SOLUTION INTRAVENOUS CONTINUOUS PRN
Status: CANCELLED
Start: 2019-11-12

## 2019-10-23 RX ORDER — MEPERIDINE HYDROCHLORIDE 25 MG/ML
25 INJECTION INTRAMUSCULAR; INTRAVENOUS; SUBCUTANEOUS EVERY 30 MIN PRN
Status: CANCELLED | OUTPATIENT
Start: 2019-11-19

## 2019-10-23 RX ORDER — DIPHENHYDRAMINE HYDROCHLORIDE 50 MG/ML
50 INJECTION INTRAMUSCULAR; INTRAVENOUS
Status: CANCELLED
Start: 2019-11-05

## 2019-10-23 RX ORDER — MEPERIDINE HYDROCHLORIDE 25 MG/ML
25 INJECTION INTRAMUSCULAR; INTRAVENOUS; SUBCUTANEOUS EVERY 30 MIN PRN
Status: CANCELLED | OUTPATIENT
Start: 2019-11-12

## 2019-10-23 RX ORDER — EPINEPHRINE 1 MG/ML
0.3 INJECTION, SOLUTION INTRAMUSCULAR; SUBCUTANEOUS EVERY 5 MIN PRN
Status: CANCELLED | OUTPATIENT
Start: 2019-11-05

## 2019-10-23 RX ORDER — NALOXONE HYDROCHLORIDE 0.4 MG/ML
.1-.4 INJECTION, SOLUTION INTRAMUSCULAR; INTRAVENOUS; SUBCUTANEOUS
Status: CANCELLED | OUTPATIENT
Start: 2019-11-12

## 2019-10-23 RX ORDER — SODIUM CHLORIDE 9 MG/ML
1000 INJECTION, SOLUTION INTRAVENOUS CONTINUOUS PRN
Status: CANCELLED
Start: 2019-11-05

## 2019-10-23 RX ORDER — ALBUTEROL SULFATE 90 UG/1
1-2 AEROSOL, METERED RESPIRATORY (INHALATION)
Status: CANCELLED
Start: 2019-11-05

## 2019-10-23 RX ORDER — EPINEPHRINE 0.3 MG/.3ML
0.3 INJECTION SUBCUTANEOUS EVERY 5 MIN PRN
Status: CANCELLED | OUTPATIENT
Start: 2019-11-19

## 2019-10-23 RX ORDER — METHYLPREDNISOLONE SODIUM SUCCINATE 125 MG/2ML
125 INJECTION, POWDER, LYOPHILIZED, FOR SOLUTION INTRAMUSCULAR; INTRAVENOUS
Status: CANCELLED
Start: 2019-11-12

## 2019-10-23 RX ORDER — SODIUM CHLORIDE 9 MG/ML
1000 INJECTION, SOLUTION INTRAVENOUS CONTINUOUS PRN
Status: CANCELLED
Start: 2019-11-19

## 2019-10-23 RX ORDER — EPINEPHRINE 0.3 MG/.3ML
0.3 INJECTION SUBCUTANEOUS EVERY 5 MIN PRN
Status: CANCELLED | OUTPATIENT
Start: 2019-11-05

## 2019-10-23 RX ORDER — LORAZEPAM 2 MG/ML
0.5 INJECTION INTRAMUSCULAR EVERY 4 HOURS PRN
Status: CANCELLED
Start: 2019-11-12

## 2019-10-23 RX ORDER — NALOXONE HYDROCHLORIDE 0.4 MG/ML
.1-.4 INJECTION, SOLUTION INTRAMUSCULAR; INTRAVENOUS; SUBCUTANEOUS
Status: CANCELLED | OUTPATIENT
Start: 2019-11-19

## 2019-10-23 RX ORDER — LORAZEPAM 2 MG/ML
0.5 INJECTION INTRAMUSCULAR EVERY 4 HOURS PRN
Status: CANCELLED
Start: 2019-11-19

## 2019-10-23 RX ORDER — EPINEPHRINE 1 MG/ML
0.3 INJECTION, SOLUTION INTRAMUSCULAR; SUBCUTANEOUS EVERY 5 MIN PRN
Status: CANCELLED | OUTPATIENT
Start: 2019-11-12

## 2019-10-23 RX ORDER — TRAZODONE HYDROCHLORIDE 50 MG/1
50 TABLET, FILM COATED ORAL AT BEDTIME
Qty: 30 TABLET | Refills: 0 | Status: SHIPPED | OUTPATIENT
Start: 2019-10-23 | End: 2019-11-26

## 2019-10-23 RX ORDER — DIPHENHYDRAMINE HCL 25 MG
50 CAPSULE ORAL ONCE
Status: CANCELLED
Start: 2019-11-19

## 2019-10-23 RX ORDER — HEPARIN SODIUM (PORCINE) LOCK FLUSH IV SOLN 100 UNIT/ML 100 UNIT/ML
5 SOLUTION INTRAVENOUS
Status: DISCONTINUED | OUTPATIENT
Start: 2019-10-23 | End: 2019-10-30 | Stop reason: HOSPADM

## 2019-10-23 RX ORDER — ALBUTEROL SULFATE 0.83 MG/ML
2.5 SOLUTION RESPIRATORY (INHALATION)
Status: CANCELLED | OUTPATIENT
Start: 2019-11-19

## 2019-10-23 RX ORDER — DIPHENHYDRAMINE HYDROCHLORIDE 50 MG/ML
50 INJECTION INTRAMUSCULAR; INTRAVENOUS
Status: CANCELLED
Start: 2019-11-12

## 2019-10-23 RX ORDER — METHYLPREDNISOLONE SODIUM SUCCINATE 125 MG/2ML
125 INJECTION, POWDER, LYOPHILIZED, FOR SOLUTION INTRAMUSCULAR; INTRAVENOUS
Status: CANCELLED
Start: 2019-11-19

## 2019-10-23 RX ORDER — ALBUTEROL SULFATE 90 UG/1
1-2 AEROSOL, METERED RESPIRATORY (INHALATION)
Status: CANCELLED
Start: 2019-11-12

## 2019-10-23 RX ORDER — ZOLEDRONIC ACID 0.04 MG/ML
4 INJECTION, SOLUTION INTRAVENOUS ONCE
Status: COMPLETED | OUTPATIENT
Start: 2019-10-23 | End: 2019-10-23

## 2019-10-23 RX ORDER — ALBUTEROL SULFATE 0.83 MG/ML
2.5 SOLUTION RESPIRATORY (INHALATION)
Status: CANCELLED | OUTPATIENT
Start: 2019-11-12

## 2019-10-23 RX ORDER — ALBUTEROL SULFATE 0.83 MG/ML
2.5 SOLUTION RESPIRATORY (INHALATION)
Status: CANCELLED | OUTPATIENT
Start: 2019-11-05

## 2019-10-23 RX ORDER — DIPHENHYDRAMINE HCL 25 MG
50 CAPSULE ORAL ONCE
Status: CANCELLED
Start: 2019-11-05

## 2019-10-23 RX ORDER — DIPHENHYDRAMINE HYDROCHLORIDE 50 MG/ML
50 INJECTION INTRAMUSCULAR; INTRAVENOUS
Status: CANCELLED
Start: 2019-11-19

## 2019-10-23 RX ORDER — NALOXONE HYDROCHLORIDE 0.4 MG/ML
.1-.4 INJECTION, SOLUTION INTRAMUSCULAR; INTRAVENOUS; SUBCUTANEOUS
Status: CANCELLED | OUTPATIENT
Start: 2019-11-05

## 2019-10-23 RX ORDER — EPINEPHRINE 0.3 MG/.3ML
0.3 INJECTION SUBCUTANEOUS EVERY 5 MIN PRN
Status: CANCELLED | OUTPATIENT
Start: 2019-11-12

## 2019-10-23 RX ORDER — EPINEPHRINE 1 MG/ML
0.3 INJECTION, SOLUTION INTRAMUSCULAR; SUBCUTANEOUS EVERY 5 MIN PRN
Status: CANCELLED | OUTPATIENT
Start: 2019-11-19

## 2019-10-23 RX ORDER — MEPERIDINE HYDROCHLORIDE 25 MG/ML
25 INJECTION INTRAMUSCULAR; INTRAVENOUS; SUBCUTANEOUS EVERY 30 MIN PRN
Status: CANCELLED | OUTPATIENT
Start: 2019-11-05

## 2019-10-23 RX ORDER — ALBUTEROL SULFATE 90 UG/1
1-2 AEROSOL, METERED RESPIRATORY (INHALATION)
Status: CANCELLED
Start: 2019-11-19

## 2019-10-23 RX ORDER — DIPHENHYDRAMINE HCL 25 MG
50 CAPSULE ORAL ONCE
Status: CANCELLED
Start: 2019-11-12

## 2019-10-23 RX ORDER — METHYLPREDNISOLONE SODIUM SUCCINATE 125 MG/2ML
125 INJECTION, POWDER, LYOPHILIZED, FOR SOLUTION INTRAMUSCULAR; INTRAVENOUS
Status: CANCELLED
Start: 2019-11-05

## 2019-10-23 RX ORDER — LORAZEPAM 2 MG/ML
0.5 INJECTION INTRAMUSCULAR EVERY 4 HOURS PRN
Status: CANCELLED
Start: 2019-11-05

## 2019-10-23 RX ADMIN — HEPARIN SODIUM (PORCINE) LOCK FLUSH IV SOLN 100 UNIT/ML 5 ML: 100 SOLUTION at 10:59

## 2019-10-23 RX ADMIN — ZOLEDRONIC ACID 4 MG: 0.04 INJECTION, SOLUTION INTRAVENOUS at 12:59

## 2019-10-23 RX ADMIN — Medication 250 ML: at 12:59

## 2019-10-23 RX ADMIN — HEPARIN SODIUM (PORCINE) LOCK FLUSH IV SOLN 100 UNIT/ML 5 ML: 100 SOLUTION at 13:21

## 2019-10-23 ASSESSMENT — MIFFLIN-ST. JEOR: SCORE: 1220.14

## 2019-10-23 ASSESSMENT — PAIN SCALES - GENERAL: PAINLEVEL: MODERATE PAIN (5)

## 2019-10-23 NOTE — PROGRESS NOTES
ONCOLOGY FOLLOW UP NOTE: 10/23/2019        I took the history from reviewing the previous notes that she was following with Dr. Amaya.  I have copied and updated from prior notes.    ONCOLOGY History:    1.  January 2006:  Diagnosed with Stage IIB, T2 N1 M0 invasive lobular carcinoma of the right breast.  Final pathology showed a 4.5 x 3.8 x 2.5 cm, 01/14 lymph nodes positive.  Estrogen, progesterone receptor positive, HER-2 negative.     2.  Genetics.  BRCA1 and 2 mutations not detected. 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       THERAPY TO DATE:   1.  2006:  ECOG 2104 protocol of dose-dense Adriamycin, Cytoxan and Avastin x4 cycles followed by Taxol and Avastin x4 cycles.   2.  03/2007:  Completed 1 year Avastin.   3.  11/2006-01/2007:  Radiotherapy to the right breast of 5040 cGy.   4.  03/[id number]:  Aromasin.  Stopped after moving to the [facility].   5.  01/2016:  Right modified radical mastectomy with latissimus dorsi flap reconstruction and a left prophylactic mastectomy with latissimus dorsi flap reconstruction.     She recently had MRI of the brain as a follow-up for multiple sclerosis and there were multiple calvarial lesions noted which was suspicious for metastatic disease.  There was no evidence of new multiple sclerosis lesions.  She had a PET scan on 8/30/2019 which showed widespread bone metastatic lesions (calvarium, spine, sacrum, pelvis, ribs most prominent at C7, T3, L1 and L4), hypermetabolic 3 cm lesion in LLL, and mediastinal/hilar LN. CEA wnl at 1.9 and C27-29 elevated to 415 (28 on 8/23/16). A CT guided biopsy of the right iliac bone was obtained on 9/4/19, pathology consistent with metastatic adenocarcinoma (breast), ER/NV negative, HER-2 negative PDL 1 < 1%. A second biopsy was take of the LLL nodule via EBUS on 9/5/19 did not show any malignancy.    C/T/L spine MRI 8/3/19 to 9/2/19 with numerous enhancing lesions of the C, T and L spine, largest around T9 and L1. No evidence  of cord compression. Has a L1 compression fracture and impending L4.     Received her radiation T12-L5 3000 cGy in 10 fractions from 9/6/2019 till 9/18/2019.  Neurosurgery recommended no surgical intervention but to wear [place] brace when out of bed and HOB >30 degrees    She started palliative Xeloda 2000/1500 on 10/1/2019 but after just a few doses she noticed nausea, red eyes blurred vision, loss of appetite.  She stopped taking it after 5 doses and was seen by nurse practitioner on 10/7/2019 when she was improving.  At that point she was restarted on Xeloda at a lower dose of 1000 mg twice a day.    I had also recommended starting Zometa so she got dental clearance to start with that.    She comes in today accompanied by her  and tells me that she was not able to tolerate the lower dose of Xeloda well.  She felt extremely nauseous and was vomiting despite taking Compazine and Zofran.  She also felt very fatigued and was in bed most of the day.  She also noticed blurry vision.  Her appetite was down and she lost some weight.  She took Xeloda from 10/7/2019 till 10/15/2019 and then stopped thinking that she is only supposed to take it for 1 week.  After stopping Xeloda her nausea and vomiting has significantly improved.  Blurry vision has improved but it has not resolved.  Her appetite is slowly coming back and she is able to eat little.  She also feels less fatigued but is still is very worn down.  She denied any hand-foot syndrome.  She has chronic left hand tingling which she attributes to her multiple sclerosis.  Overall back and rib cage pain is under decent control if she takes 2 oxycodone's twice a day.  She has not been able to sleep properly at night despite using Ativan.  In the past she has tried Ambien and melatonin and Benadryl which have not improved her sleep.  She denies new swellings.  No fevers or infections.  She has chronic heat sensitivity and weakness in the legs and balance issues and  "some forgetfulness from multiple sclerosis.      ECOG 2    ROS:  Rest of the comprehensive review of the system was essentially unremarkable.    I reviewed other history in epic as below.       PAST MEDICAL HISTORY:     1.  Breast cancer  2.  Multiple sclerosis.   3.  Depression.   4.  Migraines.   5.  Hypertension.   6.  Cholecystectomy and umbilical hernia repair.     SOCIAL HISTORY: She smoked for 5 years but quit many years ago.  Drinks alcohol socially.  She lives with her .  She is a teacher in middle school.       FAMILY HISTORY: She mentions that her mother had breast cancer at 49.  Both grandmothers had breast cancer but she is not sure of the age.  A couple of cousins have cancers.  Patient has 3 children who are healthy.    Current Outpatient Medications   Medication     acetaminophen (TYLENOL) 500 MG tablet     busPIRone (BUSPAR) 15 MG tablet     calcium citrate-vitamin D (CITRACAL) 315-250 MG-UNIT TABS     Cholecalciferol (VITAMIN D3 PO)     dexamethasone (DECADRON) 4 MG tablet     diclofenac (VOLTAREN) 1 % topical gel     erenumab-aooe (AIMOVIG 140 DOSE) 70 MG/ML injection     Lidocaine (LIDOCARE) 4 % Patch     LORazepam (ATIVAN) 0.5 MG tablet     magnesium 250 MG tablet     ondansetron (ZOFRAN-ODT) 8 MG ODT tab     oxyCODONE (ROXICODONE) 5 MG tablet     prochlorperazine (COMPAZINE) 10 MG tablet     propranolol (INDERAL LA) 60 MG 24 hr capsule     ranitidine (ZANTAC) 150 MG tablet     syringe/needle, disp, (B-D INTE[employer]RA SYRINGE) 23G X 1\" 3 ML MISC     tolterodine ER (DETROL LA) 4 MG 24 hr capsule     venlafaxine (EFFEXOR-ER) 225 MG TB24 24 hr tablet     capecitabine (XELODA) 500 MG tablet CHEMO     polyethylene glycol (MIRALAX/GLYCOLAX) packet     senna-docusate (SENOKOT-S/PERICOLACE) 8.6-50 MG tablet     No current facility-administered medications for this visit.      Facility-Administered Medications Ordered in Other Visits   Medication     heparin 100 UNIT/ML injection 5 mL     sodium " "chloride (PF) 0.9% PF flush 10-20 mL          Allergies   Allergen Reactions     Bactrim [Sulfamethoxazole W/Trimethoprim]      Internal bleeding     Copaxone [Glatiramer] Hives          PHYSICAL EXAMINATION:   /88 (BP Location: Right arm)   Pulse 78   Temp 98.1  F (36.7  C) (Oral)   Resp 16   Ht 1.575 m (5' 2.01\")   Wt 68.2 kg (150 lb 4.8 oz)   SpO2 96%   BMI 27.48 kg/m    Wt Readings from Last 4 Encounters:   10/23/19 68.2 kg (150 lb 4.8 oz)   10/07/19 68.3 kg (150 lb 8 oz)   09/18/19 72.1 kg (159 lb)   09/17/19 72.1 kg (159 lb)     CONSTITUTIONAL: No apparent distress  EYES: PERRLA, without pallor or jaundice  ENT/MOUTH: Ears unremarkable. No oral lesions  CVS: s1s2 normal  RESPIRATORY: Chest is clear  GI: Abdomen is benign  NEURO: Alert and oriented ×3  INTEGUMENT: no concerning skin rashes   LYMPHATIC: no palpable lymphadenopathy  MUSCULOSKELETAL: Unremarkable. No bony tenderness.   EXTREMITIES: no pedal edema  PSYCH: Mentation, mood and affect are appropriate      Labs and imaging reviewed.    Results for KYLE GU (MRN [id number]) as of 10/23/2019 12:01   Ref. Range 10/23/2019 11:00   Sodium Latest Ref Range: 133 - 144 mmol/L 133   Potassium Latest Ref Range: 3.4 - 5.3 mmol/L 3.7   Chloride Latest Ref Range: 94 - 109 mmol/L 101   Carbon Dioxide Latest Ref Range: 20 - 32 mmol/L 29   Urea Nitrogen Latest Ref Range: 7 - 30 mg/dL 4 (L)   Creatinine Latest Ref Range: 0.52 - 1.04 mg/dL 0.54   GFR Estimate Latest Ref Range: >60 mL/min/1.73_m2 >90   GFR Estimate If Black Latest Ref Range: >60 mL/min/1.73_m2 >90   Calcium Latest Ref Range: 8.5 - 10.1 mg/dL 9.0   Anion Gap Latest Ref Range: 3 - 14 mmol/L 3   Albumin Latest Ref Range: 3.4 - 5.0 g/dL 3.2 (L)   Protein Total Latest Ref Range: 6.8 - 8.8 g/dL 7.4   Bilirubin Total Latest Ref Range: 0.2 - 1.3 mg/dL 0.4   Alkaline Phosphatase Latest Ref Range: 40 - 150 U/L 151 (H)   ALT Latest Ref Range: 0 - 50 U/L 30   AST Latest Ref Range: 0 - 45 U/L 22 "   Glucose Latest Ref Range: 70 - 99 mg/dL 103 (H)   WBC Latest Ref Range: 4.0 - 11.0 10e9/L 5.0   Hemoglobin Latest Ref Range: 11.7 - 15.7 g/dL 13.0   Hematocrit Latest Ref Range: 35.0 - 47.0 % 39.0   Platelet Count Latest Ref Range: 150 - 450 10e9/L 189   RBC Count Latest Ref Range: 3.8 - 5.2 10e12/L 4.40   MCV Latest Ref Range: 78 - 100 fl 89   MCH Latest Ref Range: 26.5 - 33.0 pg 29.5   MCHC Latest Ref Range: 31.5 - 36.5 g/dL 33.3   RDW Latest Ref Range: 10.0 - 15.0 % 15.9 (H)   Diff Method Unknown Automated Method   % Neutrophils Latest Units: % 73.8   % Lymphocytes Latest Units: % 6.4   % Monocytes Latest Units: % 15.0   % Eosinophils Latest Units: % 2.8   % Basophils Latest Units: % 0.8   % Immature Granulocytes Latest Units: % 1.2   Absolute Neutrophil Latest Ref Range: 1.6 - 8.3 10e9/L 3.7   Absolute Lymphocytes Latest Ref Range: 0.8 - 5.3 10e9/L 0.3 (L)   Absolute Monocytes Latest Ref Range: 0.0 - 1.3 10e9/L 0.8   Absolute Eosinophils Latest Ref Range: 0.0 - 0.7 10e9/L 0.1   Absolute Basophils Latest Ref Range: 0.0 - 0.2 10e9/L 0.0          ASSESSMENT AND PLAN:   1.  History of stage IIB, T2 N1 M0 invasive lobular carcinoma of the right breast.  It was initially diagnosed in 2006 and at that time it was hormone receptor positive, HER-2 negative.  She was treated on ECOG 2104 protocol with dose-dense Adriamycin, Cytoxan and Avastin x4 cycles followed by Taxol and Avastin x4 cycles followed by a Avastin for 1 year.  She also received radiation to the right breast which she completed in January 2007 (5040 cGy).  She took Aromasin from 2007 to 2014.    Now she has metastatic breast cancer with extensive involvement of the bones.  There is also a left lower lobe hypermetabolic lesion and mediastinal lymph nodes which are hypermetabolic.  The biopsy from the right iliac bone is consistent with metastatic lobular breast cancer but it is hormone receptor and HER-2 negative and PDL 1 is also negative.    She has  received 3000 cGy of palliative radiation to T12-L5 from 9/6/2019 till 9/18/2019.    She was started on palliative Xeloda but she was unable to tolerate even the dose reduced medication.    We discussed the situation in detail.    Her quality of life was really poor when she was taking Xeloda and I believe that we need to switch treatments.    I recommend trying weekly Taxol.  She has a port in place which is working well.  We discussed the rational schedule and potential side effects of Taxol.  In the past in 2006 she did receive Taxol and she tolerated it well.  I believe it would be reasonable to try it again because it has been 13 years since she last received it.  She has mild tingling of her left hand which is a chronic issue and likely due to underlying multiple sclerosis.    I would like to check another PET scan as well as a brain MRI prior to starting Taxol.  She did have blurry vision when she was taking Xeloda and although it has improved but it has not resolved so I want to make sure that there are no findings related to brain metastasis.  Her tumor is triple negative which has a high risk of brain metastasis.    Pain management.  She has widespread metastatic breast cancer in the bones.  Her main pain is in the mid back and rib cage area.  She is taking 2 oxycodone twice a day which is controlling the pain.  Previously she got palliative radiation to T12-L5 3000 cGy in 10 fractions from 9/6/2019 till 9/18/2019.  I would like her to see palliative care.  I had given her the referral but I do not see an appointment made so we will request this again.  When she was seen by neurosurgery she was told to wear [place] brace when out of bed and HOB >30 degrees.  I wanted her to follow with a spine specialist to address this as she was asking about removing it.  I see that no appointment has been made although I gave her a referral for this.  We will try to make it as soon as possible.      Bone metastasis.  She  got dental clearance to start Zometa and we will start it today and give it to her every 3 months.  Continue calcium and vitamin D.    Nausea vomiting/poor appetite.  Nausea and vomiting is better and appetite is also improving.  Continue Compazine and Zofran.  We discussed that she should eat small frequent meals and try to increase the calories which she is taking.  I recommended follow-up with a nutritionist but she is not interested in that.    Fatigue.  Due to cancer and chemotherapy.  I recommend that she tries to keep herself active and exercise regularly to help with the fatigue.  Good nutrition would also help with this.    Insomnia.  We will try trazodone 50 mg at night.  I told her not to take trazodone with Ativan.    Discussion regarding healthcare directives.  On previous visit she told me that she will bring the health care directive for our records but she forgot so I told her to bring it on next visit.        We did not address the following today.    Breast implant removal.  She tells me that she was planning to do breast implant removal because there was a recall on this.  Her surgery was scheduled for 9/24/2019.  Because of this new development of metastatic breast cancer and her recent radiation, surgery has been canceled.  We discussed that at this time treatment for metastatic breast cancer would take precedence over the other surgery as the other surgery would require her to be off chemotherapy for several weeks and in that case the chances of cancer progression would be high and I would not recommend that.    Multiple sclerosis.  She will continue to follow with her neurologist Dr. Quiles.  Currently multiple sclerosis is under control and currently she is not taking any medications.    Return to clinic 1 week after start of Taxol to see a provider.    I answered all of her and her 's questions to their satisfaction.  They are agreeable and comfortable with the plan.    Dewayne PULIDO  MD [place]

## 2019-10-23 NOTE — PROGRESS NOTES
Power port one inch needle left accessed for treatment. Tolerated port access and draw without complaint. Port site scrubbed with Chloraprep for 30 seconds. Accessed using sterile technique. [place] drawn-Red/Green/Purple tubes. Double signed by patient and RN. See documentation flowsheet. Mariah Paniagua, RN, BSN, OCN

## 2019-10-23 NOTE — PROGRESS NOTES
"Met with patient after visit with Dr. Zepeda to review Paclitaxel chemotherapy.  Patient has not been doing well on Xeloda with uncontrolled nausea and vomiting in spite of trying different antiemetics.  Dr. Zepeda is recommending to switching to weekly Paclitaxel chemotherapy.  Provided her with information on this drug, including most common side effects.  She was given a Guidebook, entitled \"My Chemotherapy Guidebook.\"  Reviewed side effects that warrant immediate attention to contact our clinic; given Triage and 24-hour on call provider phone numbers.  Gave her writer's contact information.  Will proceed and get her scheduled for Palliative Care (she has appt for 12/4), spine surgery visit and initial chemotherapy.  Provided Shaneka a Health Care Directive form.  About 25 minutes was spent with patient and spouse including coordinating care.        "

## 2019-10-23 NOTE — PATIENT INSTRUCTIONS
Zometa today  Please schedule appointments with palliative care and Spine Specialist    Schedule PET CT and MRI Brain    Schedule Taxol weekly and see a provider 1 week after start of Taxol. See me back 4 weeks after start of Taxol.    Start Trazodone 50 mg at bedtime for sleep

## 2019-10-23 NOTE — NURSING NOTE
"Oncology Rooming Note    October 23, 2019 11:33 AM   Shaneka Alvarez is a 59 year old female who presents for:    Chief Complaint   Patient presents with     Oncology Clinic Visit     Follow up     Initial Vitals: /88 (BP Location: Right arm)   Pulse 78   Temp 98.1  F (36.7  C) (Oral)   Resp 16   Ht 1.575 m (5' 2.01\")   Wt 68.2 kg (150 lb 4.8 oz)   SpO2 96%   BMI 27.48 kg/m   Estimated body mass index is 27.48 kg/m  as calculated from the following:    Height as of this encounter: 1.575 m (5' 2.01\").    Weight as of this encounter: 68.2 kg (150 lb 4.8 oz). Body surface area is 1.73 meters squared.  Moderate Pain (5) Comment: Data Unavailable   No LMP recorded. Patient is postmenopausal.  Allergies reviewed: Yes  Medications reviewed: Yes    Medications: MEDICATION REFILLS NEEDED TODAY. Provider was notified.  Pharmacy name entered into [facility]:    [id number] - [place], [place] - [address] AT [id number] [place], [place] - [address] AT [facility] [address]  [facility] - [place], [place] - [phone]              "

## 2019-10-23 NOTE — PROGRESS NOTES
"Infusion Nursing Note:  Shaneka Alvarez presents today for C1D1 Zometa.    Patient seen by provider today: Yes: Dr. Zepeda   present during visit today: Not Applicable.    Note: Informed patient of the importance of informing her dentist she is on Zometa and reporting jaw pain. States she recently received the \"all clear\" from her dentist. Confirms she is taking calcium and vitamin D.    Intravenous Access:  Implanted Port.    Treatment Conditions:  Lab Results   Component Value Date     10/23/2019                   Lab Results   Component Value Date    POTASSIUM 3.7 10/23/2019           No results found for: MAG         Lab Results   Component Value Date    CR 0.54 10/23/2019                   Lab Results   Component Value Date    RAFAELA 9.0 10/23/2019                Lab Results   Component Value Date    BILITOTAL 0.4 10/23/2019           Lab Results   Component Value Date    ALBUMIN 3.2 10/23/2019                    Lab Results   Component Value Date    ALT 30 10/23/2019           Lab Results   Component Value Date    AST 22 10/23/2019       Results reviewed, labs MET treatment parameters, ok to proceed with treatment.      Post Infusion Assessment:  Patient tolerated infusion without incident.  Blood return noted pre and post infusion.  Site patent and intact, free from redness, edema or discomfort.  No evidence of extravasations.  Access discontinued per protocol.       Discharge Plan:   Patient went to schedule next appointment.   Patient discharged in stable condition accompanied by: male.  Departure Mode: Ambulatory.    Mariah Paniagua RN                        "

## 2019-10-23 NOTE — PROGRESS NOTES
ORAL CHEMOTHERAPY DISCONTINUATION       Primary Oncologist:  Dr. Dewayne Zepeda  Primary Oncology Clinic: [facility]  Therapy History:  Drug: capecitabine (Xeloda) 2000 mg in AM and 1500 mg in PM  Start Date: 10/1/19  10/3: Stopped due to side effects (vision, N/V)  10/7: started reduced dose of 1000 mg BID    Therapy Ended On:  10/23/2019  Reason For Discontinuation: unacceptable toxicity    Additional Notes:  Patient is switching to weekly Taxol    Thank you for the opportunity to be a part in the care of this patient's oral chemotherapy. The oncology pharmacy will no longer be following this patient for oral chemotherapy. If there are any questions or the plan changes, feel free to contact us.    Idalia Gonzalez, Pharm. D., BCOP  [phone]

## 2019-10-23 NOTE — LETTER
10/23/2019         RE: Shaneka Alvarez  [address] [facility] [address]        Dear Colleague,    Thank you for referring your patient, Shaneka Alvarez, to the [facility]. Please see a copy of my visit note below.    ONCOLOGY FOLLOW UP NOTE: 10/23/2019        I took the history from reviewing the previous notes that she was following with Dr. Amaya.  I have copied and updated from prior notes.    ONCOLOGY History:    1.  January 2006:  Diagnosed with Stage IIB, T2 N1 M0 invasive lobular carcinoma of the right breast.  Final pathology showed a 4.5 x 3.8 x 2.5 cm, 01/14 lymph nodes positive.  Estrogen, progesterone receptor positive, HER-2 negative.     2.  Genetics.  BRCA1 and 2 mutations not detected. 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       THERAPY TO DATE:   1.  2006:  ECOG 2104 protocol of dose-dense Adriamycin, Cytoxan and Avastin x4 cycles followed by Taxol and Avastin x4 cycles.   2.  03/2007:  Completed 1 year Avastin.   3.  11/2006-01/2007:  Radiotherapy to the right breast of 5040 cGy.   4.  03/[id number]:  Aromasin.  Stopped after moving to the [facility].   5.  01/2016:  Right modified radical mastectomy with latissimus dorsi flap reconstruction and a left prophylactic mastectomy with latissimus dorsi flap reconstruction.     She recently had MRI of the brain as a follow-up for multiple sclerosis and there were multiple calvarial lesions noted which was suspicious for metastatic disease.  There was no evidence of new multiple sclerosis lesions.  She had a PET scan on 8/30/2019 which showed widespread bone metastatic lesions (calvarium, spine, sacrum, pelvis, ribs most prominent at C7, T3, L1 and L4), hypermetabolic 3 cm lesion in LLL, and mediastinal/hilar LN. CEA wnl at 1.9 and C27-29 elevated to 415 (28 on 8/23/16). A CT guided biopsy of the right iliac bone was obtained on 9/4/19, pathology consistent with metastatic adenocarcinoma (breast), ER/[place] negative,  HER-2 negative PDL 1 < 1%. A second biopsy was take of the LLL nodule via EBUS on 9/5/19 did not show any malignancy.    C/T/L spine MRI 8/3/19 to 9/2/19 with numerous enhancing lesions of the C, T and L spine, largest around T9 and L1. No evidence of cord compression. Has a L1 compression fracture and impending L4.     Received her radiation T12-L5 3000 cGy in 10 fractions from 9/6/2019 till 9/18/2019.  Neurosurgery recommended no surgical intervention but to wear Debord brace when out of bed and HOB >30 degrees    She started palliative Xeloda 2000/1500 on 10/1/2019 but after just a few doses she noticed nausea, red eyes blurred vision, loss of appetite.  She stopped taking it after 5 doses and was seen by nurse practitioner on 10/7/2019 when she was improving.  At that point she was restarted on Xeloda at a lower dose of 1000 mg twice a day.    I had also recommended starting Zometa so she got dental clearance to start with that.    She comes in today accompanied by her  and tells me that she was not able to tolerate the lower dose of Xeloda well.  She felt extremely nauseous and was vomiting despite taking Compazine and Zofran.  She also felt very fatigued and was in bed most of the day.  She also noticed blurry vision.  Her appetite was down and she lost some weight.  She took Xeloda from 10/7/2019 till 10/15/2019 and then stopped thinking that she is only supposed to take it for 1 week.  After stopping Xeloda her nausea and vomiting has significantly improved.  Blurry vision has improved but it has not resolved.  Her appetite is slowly coming back and she is able to eat little.  She also feels less fatigued but is still is very worn down.  She denied any hand-foot syndrome.  She has chronic left hand tingling which she attributes to her multiple sclerosis.  Overall back and rib cage pain is under decent control if she takes 2 oxycodone's twice a day.  She has not been able to sleep properly at night  "despite using Ativan.  In the past she has tried Ambien and melatonin and Benadryl which have not improved her sleep.  She denies new swellings.  No fevers or infections.  She has chronic heat sensitivity and weakness in the legs and balance issues and some forgetfulness from multiple sclerosis.      ECOG 2    ROS:  Rest of the comprehensive review of the system was essentially unremarkable.    I reviewed other history in epic as below.       PAST MEDICAL HISTORY:     1.  Breast cancer  2.  Multiple sclerosis.   3.  Depression.   4.  Migraines.   5.  Hypertension.   6.  Cholecystectomy and umbilical hernia repair.     SOCIAL HISTORY: She smoked for 5 years but quit many years ago.  Drinks alcohol socially.  She lives with her .  She is a teacher in middle school.       FAMILY HISTORY: She mentions that her mother had breast cancer at 49.  Both grandmothers had breast cancer but she is not sure of the age.  A couple of cousins have cancers.  Patient has 3 children who are healthy.    Current Outpatient Medications   Medication     acetaminophen (TYLENOL) 500 MG tablet     busPIRone (BUSPAR) 15 MG tablet     calcium citrate-vitamin D (CITRACAL) 315-250 MG-UNIT TABS     Cholecalciferol (VITAMIN D3 PO)     dexamethasone (DECADRON) 4 MG tablet     diclofenac (VOLTAREN) 1 % topical gel     erenumab-aooe (AIMOVIG 140 DOSE) 70 MG/ML injection     Lidocaine (LIDOCARE) 4 % Patch     LORazepam (ATIVAN) 0.5 MG tablet     magnesium 250 MG tablet     ondansetron (ZOFRAN-ODT) 8 MG ODT tab     oxyCODONE (ROXICODONE) 5 MG tablet     prochlorperazine (COMPAZINE) 10 MG tablet     propranolol (INDERAL LA) 60 MG 24 hr capsule     ranitidine (ZANTAC) 150 MG tablet     syringe/needle, disp, (B-D INTEGRA SYRINGE) 23G X 1\" 3 ML MISC     tolterodine ER (DETROL LA) 4 MG 24 hr capsule     venlafaxine (EFFEXOR-ER) 225 MG TB24 24 hr tablet     capecitabine (XELODA) 500 MG tablet CHEMO     polyethylene glycol (MIRALAX/GLYCOLAX) packet     " "senna-docusate (SENOKOT-S/PERICOLACE) 8.6-50 MG tablet     No current facility-administered medications for this visit.      Facility-Administered Medications Ordered in Other Visits   Medication     heparin 100 UNIT/ML injection 5 mL     sodium chloride (PF) 0.9% PF flush 10-20 mL          Allergies   Allergen Reactions     Bactrim [Sulfamethoxazole W/Trimethoprim]      Internal bleeding     Copaxone [Glatiramer] Hives          PHYSICAL EXAMINATION:   /88 (BP Location: Right arm)   Pulse 78   Temp 98.1  F (36.7  C) (Oral)   Resp 16   Ht 1.575 m (5' 2.01\")   Wt 68.2 kg (150 lb 4.8 oz)   SpO2 96%   BMI 27.48 kg/m     Wt Readings from Last 4 Encounters:   10/23/19 68.2 kg (150 lb 4.8 oz)   10/07/19 68.3 kg (150 lb 8 oz)   09/18/19 72.1 kg (159 lb)   09/17/19 72.1 kg (159 lb)     CONSTITUTIONAL: No apparent distress  EYES: PERRLA, without pallor or jaundice  ENT/MOUTH: Ears unremarkable. No oral lesions  CVS: s1s2 normal  RESPIRATORY: Chest is clear  GI: Abdomen is benign  NEURO: Alert and oriented ×3  INTEGUMENT: no concerning skin rashes   LYMPHATIC: no palpable lymphadenopathy  MUSCULOSKELETAL: Unremarkable. No bony tenderness.   EXTREMITIES: no pedal edema  PSYCH: Mentation, mood and affect are appropriate      Labs and imaging reviewed.    Results for KYLE GU (MRN [id number]) as of 10/23/2019 12:01   Ref. Range 10/23/2019 11:00   Sodium Latest Ref Range: 133 - 144 mmol/L 133   Potassium Latest Ref Range: 3.4 - 5.3 mmol/L 3.7   Chloride Latest Ref Range: 94 - 109 mmol/L 101   Carbon Dioxide Latest Ref Range: 20 - 32 mmol/L 29   Urea Nitrogen Latest Ref Range: 7 - 30 mg/dL 4 (L)   Creatinine Latest Ref Range: 0.52 - 1.04 mg/dL 0.54   GFR Estimate Latest Ref Range: >60 mL/min/1.73_m2 >90   GFR Estimate If Black Latest Ref Range: >60 mL/min/1.73_m2 >90   Calcium Latest Ref Range: 8.5 - 10.1 mg/dL 9.0   Anion Gap Latest Ref Range: 3 - 14 mmol/L 3   Albumin Latest Ref Range: 3.4 - 5.0 g/dL 3.2 (L) "   Protein Total Latest Ref Range: 6.8 - 8.8 g/dL 7.4   Bilirubin Total Latest Ref Range: 0.2 - 1.3 mg/dL 0.4   Alkaline Phosphatase Latest Ref Range: 40 - 150 U/L 151 (H)   ALT Latest Ref Range: 0 - 50 U/L 30   AST Latest Ref Range: 0 - 45 U/L 22   Glucose Latest Ref Range: 70 - 99 mg/dL 103 (H)   WBC Latest Ref Range: 4.0 - 11.0 10e9/L 5.0   Hemoglobin Latest Ref Range: 11.7 - 15.7 g/dL 13.0   Hematocrit Latest Ref Range: 35.0 - 47.0 % 39.0   Platelet Count Latest Ref Range: 150 - 450 10e9/L 189   RBC Count Latest Ref Range: 3.8 - 5.2 10e12/L 4.40   MCV Latest Ref Range: 78 - 100 fl 89   MCH Latest Ref Range: 26.5 - 33.0 pg 29.5   MCHC Latest Ref Range: 31.5 - 36.5 g/dL 33.3   RDW Latest Ref Range: 10.0 - 15.0 % 15.9 (H)   Diff Method Unknown Automated Method   % Neutrophils Latest Units: % 73.8   % Lymphocytes Latest Units: % 6.4   % Monocytes Latest Units: % 15.0   % Eosinophils Latest Units: % 2.8   % Basophils Latest Units: % 0.8   % Immature Granulocytes Latest Units: % 1.2   Absolute Neutrophil Latest Ref Range: 1.6 - 8.3 10e9/L 3.7   Absolute Lymphocytes Latest Ref Range: 0.8 - 5.3 10e9/L 0.3 (L)   Absolute Monocytes Latest Ref Range: 0.0 - 1.3 10e9/L 0.8   Absolute Eosinophils Latest Ref Range: 0.0 - 0.7 10e9/L 0.1   Absolute Basophils Latest Ref Range: 0.0 - 0.2 10e9/L 0.0          ASSESSMENT AND PLAN:   1.  History of stage IIB, T2 N1 M0 invasive lobular carcinoma of the right breast.  It was initially diagnosed in 2006 and at that time it was hormone receptor positive, HER-2 negative.  She was treated on ECOG 2104 protocol with dose-dense Adriamycin, Cytoxan and Avastin x4 cycles followed by Taxol and Avastin x4 cycles followed by a Avastin for 1 year.  She also received radiation to the right breast which she completed in January 2007 (5040 cGy).  She took Aromasin from 2007 to 2014.    Now she has metastatic breast cancer with extensive involvement of the bones.  There is also a left lower lobe  hypermetabolic lesion and mediastinal lymph nodes which are hypermetabolic.  The biopsy from the right iliac bone is consistent with metastatic lobular breast cancer but it is hormone receptor and HER-2 negative and PDL 1 is also negative.    She has received 3000 cGy of palliative radiation to T12-L5 from 9/6/2019 till 9/18/2019.    She was started on palliative Xeloda but she was unable to tolerate even the dose reduced medication.    We discussed the situation in detail.    Her quality of life was really poor when she was taking Xeloda and I believe that we need to switch treatments.    I recommend trying weekly Taxol.  She has a port in place which is working well.  We discussed the rational schedule and potential side effects of Taxol.  In the past in 2006 she did receive Taxol and she tolerated it well.  I believe it would be reasonable to try it again because it has been 13 years since she last received it.  She has mild tingling of her left hand which is a chronic issue and likely due to underlying multiple sclerosis.    I would like to check another PET scan as well as a brain MRI prior to starting Taxol.  She did have blurry vision when she was taking Xeloda and although it has improved but it has not resolved so I want to make sure that there are no findings related to brain metastasis.  Her tumor is triple negative which has a high risk of brain metastasis.    Pain management.  She has widespread metastatic breast cancer in the bones.  Her main pain is in the mid back and rib cage area.  She is taking 2 oxycodone twice a day which is controlling the pain.  Previously she got palliative radiation to T12-L5 3000 cGy in 10 fractions from 9/6/2019 till 9/18/2019.  I would like her to see palliative care.  I had given her the referral but I do not see an appointment made so we will request this again.  When she was seen by neurosurgery she was told to wear Holliday brace when out of bed and HOB >30 degrees.  I  wanted her to follow with a spine specialist to address this as she was asking about removing it.  I see that no appointment has been made although I gave her a referral for this.  We will try to make it as soon as possible.      Bone metastasis.  She got dental clearance to start Zometa and we will start it today and give it to her every 3 months.  Continue calcium and vitamin D.    Nausea vomiting/poor appetite.  Nausea and vomiting is better and appetite is also improving.  Continue Compazine and Zofran.  We discussed that she should eat small frequent meals and try to increase the calories which she is taking.  I recommended follow-up with a nutritionist but she is not interested in that.    Fatigue.  Due to cancer and chemotherapy.  I recommend that she tries to keep herself active and exercise regularly to help with the fatigue.  Good nutrition would also help with this.    Insomnia.  We will try trazodone 50 mg at night.  I told her not to take trazodone with Ativan.    Discussion regarding healthcare directives.  On previous visit she told me that she will bring the health care directive for our records but she forgot so I told her to bring it on next visit.        We did not address the following today.    Breast implant removal.  She tells me that she was planning to do breast implant removal because there was a recall on this.  Her surgery was scheduled for 9/24/2019.  Because of this new development of metastatic breast cancer and her recent radiation, surgery has been canceled.  We discussed that at this time treatment for metastatic breast cancer would take precedence over the other surgery as the other surgery would require her to be off chemotherapy for several weeks and in that case the chances of cancer progression would be high and I would not recommend that.    Multiple sclerosis.  She will continue to follow with her neurologist Dr. Quiles.  Currently multiple sclerosis is under control and  currently she is not taking any medications.    Return to clinic 1 week after start of Taxol to see a provider.    I answered all of her and her 's questions to their satisfaction.  They are agreeable and comfortable with the plan.    Dewayne Zepeda MD      Again, thank you for allowing me to participate in the care of your patient.        Sincerely,        Dewayne Zepeda MD

## 2019-10-25 ENCOUNTER — ANCILLARY PROCEDURE (OUTPATIENT)
Dept: MRI IMAGING | Facility: CLINIC | Age: 59
End: 2019-10-25
Attending: INTERNAL MEDICINE
Payer: COMMERCIAL

## 2019-10-25 DIAGNOSIS — C79.51 BONE METASTASES: ICD-10-CM

## 2019-10-25 DIAGNOSIS — H53.8 BLURRED VISION: ICD-10-CM

## 2019-10-25 DIAGNOSIS — C50.919 METASTATIC BREAST CANCER: ICD-10-CM

## 2019-10-25 PROCEDURE — 70553 MRI BRAIN STEM W/O & W/DYE: CPT | Performed by: RADIOLOGY

## 2019-10-25 PROCEDURE — A9585 GADOBUTROL INJECTION: HCPCS | Performed by: INTERNAL MEDICINE

## 2019-10-25 RX ORDER — GADOBUTROL 604.72 MG/ML
7.5 INJECTION INTRAVENOUS ONCE
Status: COMPLETED | OUTPATIENT
Start: 2019-10-25 | End: 2019-10-25

## 2019-10-25 RX ADMIN — GADOBUTROL 7 ML: 604.72 INJECTION INTRAVENOUS at 13:28

## 2019-10-26 NOTE — TELEPHONE ENCOUNTER
DIAGNOSIS: Mass on spine. Referred by Dr Munoz. Appt per pt.   APPOINTMENT DATE: 10.29.19   NOTES STATUS DETAILS   OFFICE NOTE from referring provider Internal 10.7.19 Annie Bailon   OFFICE NOTE from other specialist Internal    DISCHARGE SUMMARY from hospital N/A    DISCHARGE REPORT from the ER N/A    OPERATIVE REPORT N/A    MEDICATION LIST Internal    IMPLANT RECORD/STICKER N/A    LABS     CBC/DIFF Internal 10.23.19   CULTURES N/A    INJECTIONS DONE IN RADIOLOGY N/A    MRI Internal 10.25.19, 9.2.19, 8.31.19   CT SCAN Internal 9.4.19, 8.31.19   XRAYS (IMAGES & REPORTS) Internal 9.4.19, 9.1.19   TUMOR     PATHOLOGY  Slides & report N/A

## 2019-10-28 ENCOUNTER — PATIENT OUTREACH (OUTPATIENT)
Dept: ONCOLOGY | Facility: CLINIC | Age: 59
End: 2019-10-28

## 2019-10-28 NOTE — PROGRESS NOTES
Reached out to patient with questions about her Disability Forms.  She also stated that she didn't feel well all weekend with nausea and vomiting.  She states she is drinking; anti-nausea meds have not been effective;she has been off Xeloda and will be starting Taxol on 11/5.  Today, she was able to hold down some yogurt and a couple bottles of water.  Advised that she should try to drink about 8 glasses a day.  Will check on her tomorrow; felt she may need some support with IV fluids but the thought of coming in was not appealing to her.

## 2019-10-29 ENCOUNTER — PRE VISIT (OUTPATIENT)
Dept: ORTHOPEDICS | Facility: CLINIC | Age: 59
End: 2019-10-29

## 2019-10-30 ENCOUNTER — ONCOLOGY VISIT (OUTPATIENT)
Dept: RADIATION ONCOLOGY | Facility: CLINIC | Age: 59
End: 2019-10-30

## 2019-10-31 NOTE — PROCEDURES
Radiotherapy Treatment Summary          Date of Report: 2019     PATIENT: KYLE GU  MEDICAL RECORD NO: [id number]  : 1960     DIAGNOSIS: C79.51 Secondary malignant neoplasm of bone  INTENT OF RADIOTHERAPY: palliative  PATHOLOGY/STAGE: 59 year old female with pathologic compression fracture of L1 without spinal cord compression   and diffuse bone lesions on imaging in context of a remote history of breast cancer. She recently completed a palliative   course of radiotherapy as detailed below                                     Details of the treatments summarized below are found in records kept in the Department of Radiation Oncology at   [facility].     Treatment Summary:  Radiation Oncology - Course: 2 Protocol:   Treatment Site Current Dose Modality From To Elapsed Days Fx.  2 T12-L5   3,000 cGy 18 X  9/06/2019  2019  12  10          Dose per Fraction:        Total Dose:        300cGy    3000cGy         COMMENTS:                      (Acute Toxicity Profile by CTC v5.0)  Nausea: Grade 1: Loss of appetite without alteration in eating habits.  Nausea was well controlled with antiemetics.      PAIN MANAGEMENT:                           Towards the end of treatment she was also using less narcotic medication, only 1-2 tabs of oxycodone per day.      FOLLOW UP PLAN:                         1. Follow up with Rad Onc prn     2. Follow up with Med Onc as previously directed  3. Continue antiemetics as previously prescribed  4. Continue to wean narcotics as tolerated     Staff Physician: Mateo Keller M.D.  Physicist: Pao Mixon MS     CC: Dewayne Zepeda MD              Radiation Oncology [address] [place] [address] Phone: [phone]

## 2019-11-01 ENCOUNTER — ANCILLARY PROCEDURE (OUTPATIENT)
Dept: GENERAL RADIOLOGY | Facility: CLINIC | Age: 59
End: 2019-11-01
Attending: ORTHOPAEDIC SURGERY
Payer: COMMERCIAL

## 2019-11-01 ENCOUNTER — HOSPITAL ENCOUNTER (OUTPATIENT)
Dept: PET IMAGING | Facility: CLINIC | Age: 59
Discharge: HOME | End: 2019-11-01
Attending: INTERNAL MEDICINE | Admitting: INTERNAL MEDICINE
Payer: COMMERCIAL

## 2019-11-01 ENCOUNTER — OFFICE VISIT (OUTPATIENT)
Dept: ORTHOPEDICS | Facility: CLINIC | Age: 59
End: 2019-11-01
Payer: COMMERCIAL

## 2019-11-01 VITALS — HEIGHT: 62 IN | WEIGHT: 150 LBS | BODY MASS INDEX: 27.6 KG/M2

## 2019-11-01 DIAGNOSIS — H53.8 BLURRED VISION: ICD-10-CM

## 2019-11-01 DIAGNOSIS — M54.50 LUMBAR PAIN: Primary | ICD-10-CM

## 2019-11-01 DIAGNOSIS — C50.919 METASTATIC BREAST CANCER: ICD-10-CM

## 2019-11-01 DIAGNOSIS — C79.51 BONE METASTASES: ICD-10-CM

## 2019-11-01 PROCEDURE — 40000556 ZZH STATISTIC PERIPHERAL IV START W US GUIDANCE

## 2019-11-01 PROCEDURE — 78816 PET IMAGE W/CT FULL BODY: CPT | Mod: PS

## 2019-11-01 PROCEDURE — A9552 F18 FDG: HCPCS | Performed by: INTERNAL MEDICINE

## 2019-11-01 PROCEDURE — 71260 CT THORAX DX C+: CPT

## 2019-11-01 PROCEDURE — 25000128 H RX IP 250 OP 636: Performed by: INTERNAL MEDICINE

## 2019-11-01 PROCEDURE — 34300033 ZZH RX 343: Performed by: INTERNAL MEDICINE

## 2019-11-01 RX ORDER — IOPAMIDOL 755 MG/ML
45-135 INJECTION, SOLUTION INTRAVASCULAR ONCE
Status: COMPLETED | OUTPATIENT
Start: 2019-11-01 | End: 2019-11-01

## 2019-11-01 RX ADMIN — FLUDEOXYGLUCOSE F-18 10.24 MCI.: 500 INJECTION, SOLUTION INTRAVENOUS at 13:00

## 2019-11-01 RX ADMIN — IOPAMIDOL 92 ML: 755 INJECTION, SOLUTION INTRAVENOUS at 14:43

## 2019-11-01 ASSESSMENT — ENCOUNTER SYMPTOMS
PARALYSIS: 0
FEVER: 0
STIFFNESS: 0
SEIZURES: 0
LOSS OF CONSCIOUSNESS: 0
NERVOUS/ANXIOUS: 1
DECREASED CONCENTRATION: 1
TREMORS: 0
EYE WATERING: 0
CHILLS: 0
EYE PAIN: 0
HALLUCINATIONS: 0
POLYDIPSIA: 0
DEPRESSION: 1
ARTHRALGIAS: 0
FATIGUE: 1
ALTERED TEMPERATURE REGULATION: 0
TINGLING: 1
DOUBLE VISION: 0
DIZZINESS: 1
DISTURBANCES IN COORDINATION: 0
MYALGIAS: 0
NIGHT SWEATS: 0
WEIGHT LOSS: 0
POLYPHAGIA: 0
SPEECH CHANGE: 0
INCREASED ENERGY: 0
PANIC: 1
HEADACHES: 1
WEIGHT GAIN: 0
WEAKNESS: 1
EYE REDNESS: 1
NECK PAIN: 1
MUSCLE WEAKNESS: 0
EYE IRRITATION: 1
MUSCLE CRAMPS: 0
INSOMNIA: 1
BACK PAIN: 1
DECREASED APPETITE: 1
MEMORY LOSS: 0
JOINT SWELLING: 0
NUMBNESS: 0

## 2019-11-01 ASSESSMENT — MIFFLIN-ST. JEOR: SCORE: 1218.65

## 2019-11-01 NOTE — PROGRESS NOTES
Spine Surgery Consultation    REFERRING PHYSICIAN: Referred Self   PRIMARY CARE PHYSICIAN: Mohit Barcenas           Chief Complaint:   Consult (mass on spine mainly in thoracic spine )      History of Present Illness:  Symptom Profile Including: location of symptoms, onset, severity, exacerbating/alleviating factors, previous treatments:        Shaneka Alvarez is a 59 year old female who presents today to gain more information about the metastatic lesions in her spine.  Patient says that she has been dealing with pain in her back for about 2 years, but only found out that she had metastatic lesions in her spine about 2 months ago.  Patient says that pain is moderate, and is located throughout the back.  Sometimes worse in her neck, sometimes worse in her low back.  Today she says pain is mainly localized in her middle of her back.  Denies any radicular pain in upper and lower extremities.  She denies any new onset of weakness or numbness in limbs.  Says that she has long-standing weakness from multiple sclerosis throughout her right side.  Patient says that she has been controlling her pain with 2 oxycodone 5 mg tablets twice daily.  She is also been using a Gorge brace for support.  She says when she does not wear the brace, her back will feel achy.  She is currently on Taxol for chemotherapy treatment.  Recently went through palliative radiation from T12-L5 in September 2019.    Past medical history  Breast cancer, recently diagnosed with metastatic bone lesions  Multiple sclerosis, diagnosed 10 years ago. Stable.  Migraine HA    Social Hx:  Former , no longer working after recent metastatic diagnosis.  Lives in [place] with          Past Medical History:     Past Medical History:   Diagnosis Date     Breast cancer (H)     Age 44     Common migraine      H/O bilateral mastectomy      Mild major depression (H)      Multiple sclerosis (H)             Past Surgical History:     Past  Surgical History:   Procedure Laterality Date     ENDOBRONCHIAL ULTRASOUND FLEXIBLE N/A 2019    Procedure: Flexible Bronchoscopy, Endobronchial Ultrasound, Radial Probe;  Surgeon: Sree Sanchez MD;  Location: [facility] OR     HC REMOVAL OF OVARY/TUBE(S)       HERNIA REPAIR, UMBILICAL       mastectomy  Bilateral 2006     MASTECTOMY, BILATERAL              Social History:     Social History     Tobacco Use     Smoking status: Former Smoker     Types: Cigarettes     Last attempt to quit: 2005     Years since quittin.9     Smokeless tobacco: Never Used   Substance Use Topics     Alcohol use: Yes     Alcohol/week: 2.0 standard drinks     Types: 1 Glasses of wine, 1 Cans of beer per week     Comment: TWICE A WEEK            Family History:     Family History   Problem Relation Age of Onset     Breast Cancer Maternal Aunt 50         at 85     Breast Cancer Cousin 40     Breast Cancer Mother 49        2nd primary, contralateral breast at 69;  at 86     Melanoma Mother      Breast Cancer Maternal Grandmother 40     Ovarian Cancer Maternal Grandmother      Breast Cancer Paternal Grandmother 50         at 60     Brain Cancer Cousin 20     Breast Cancer Paternal Aunt 50         at 60     Breast Cancer Cousin 45        paternal cousin            Allergies:     Allergies   Allergen Reactions     Bactrim [Sulfamethoxazole W/Trimethoprim]      Internal bleeding     Copaxone [Glatiramer] Hives            Medications:     Current Outpatient Medications   Medication     acetaminophen (TYLENOL) 500 MG tablet     busPIRone (BUSPAR) 15 MG tablet     calcium citrate-vitamin D (CITRACAL) 315-250 MG-UNIT TABS     Cholecalciferol (VITAMIN D3 PO)     dexamethasone (DECADRON) 4 MG tablet     diclofenac (VOLTAREN) 1 % topical gel     erenumab-aooe (AIMOVIG 140 DOSE) 70 MG/ML injection     Lidocaine (LIDOCARE) 4 % Patch     magnesium 250 MG tablet     ondansetron (ZOFRAN-ODT) 8 MG ODT tab     oxyCODONE  "(ROXICODONE) 5 MG tablet     polyethylene glycol (MIRALAX/GLYCOLAX) packet     propranolol (INDERAL LA) 60 MG 24 hr capsule     ranitidine (ZANTAC) 150 MG tablet     senna-docusate (SENOKOT-S/PERICOLACE) 8.6-50 MG tablet     syringe/needle, disp, (B-D INTEGRA SYRINGE) 23G X 1\" 3 ML MISC     tolterodine ER (DETROL LA) 4 MG 24 hr capsule     traZODone (DESYREL) 50 MG tablet     venlafaxine (EFFEXOR-ER) 225 MG TB24 24 hr tablet     No current facility-administered medications for this visit.              Review of Systems:     A 10 point ROS was performed and reviewed. Specific responses to these questions are noted at the end of the document.         Physical Exam:   Vitals: Ht 1.575 m (5' 2\")   Wt 68 kg (150 lb)   BMI 27.44 kg/m    Constitutional: awake, alert, cooperative, no apparent distress, appears stated age.    Eyes: The sclera are white.  Ears, Nose, Throat: The trachea is midline.  Psychiatric: The patient has a normal affect.  Respiratory: breathing non-labored  Cardiovascular: The extremities are warm and perfused.  Skin: no obvious rashes or lesions.  Musculoskeletal, Neurologic, and Spine: Patient wearing a Gorge brace in room.  Stands with neutral coronal and sagittal balance.  Normal gait.  Unable to do Tandem gait with ease.  Negative Romberg's.        Cervical spine:    Appearance - Normal    Palpation - Non-tender to palpation midline    Motor - slightly decreased strength throughout right arm; .    UPPER EXTREMITY Left Right    strength 5/5 5/5   Finger ab/adduction 5/5 5/5   Wrist flexion 5/5 4+/5   Wrist extension 5/5 4+/5   Elbow flexion 5/5 4+/5   Elbow extension 5/5 4+/5   Shoulder flexion 5/5 4+/5               Thoracic Spine:    Appearance - Normal     Palpation - Non-tender to palpation of midline or paraspinal muscles.  No point tenderness or step-offs.    Strength/ROM - deferred            Lumbar Spine:    Appearance - Normal     Palpation - Non-tender to palpation  to palpation of " midline or paraspinal muscles.  No point tenderness or step-offs.    ROM - Full     Motor -        LOWER EXTREMITY Left Right   Hip flexion 5/5 5/5   Knee flexion 5/5 5/5   Knee extension 5/5 5/5   Ankle dorsiflexion 5/5 5/5   Ankle plantarflexion 5/5 5/5   Great toe extension 5/5 5/5        Special tests -     Straight leg raise -negative bilateral     Neurologic - Sensation intact to light touch bilaterally.      REFLEXES Left Right   Biceps 1+ 1+   Triceps 1+ 1+   Brachioradialis 1+ 1+   Patella 1+ 1+   Ankle jerk 1+ 1+   Babinski  downgoing  downgoing            Imaging:   We ordered and independently reviewed new radiographs at this clinic visit. The results were discussed with the patient.  Findings include:    Cervical MRI dated September 2, 2019 shows multiple enhancing lesions throughout the cervical spine concerning for metastatic disease.      September 1, 2019 standing thoracolumbar films show bony destruction at L1 consistent with pathologic fracture in the setting of known metastatic disease.      August 31, 2019 lumbar CT scan shows probable metastatic involvement at L4 L2 and L1 and at L1 there is anterior wedging and kyphosis consistent with pathologic fracture     lumbar MRI August 31, 2019 again shows multiple metastatic lesions with suspected pathologic fracture at L1 no severe spinal canal narrowing mild to moderate left lateral recess at the level of L1     thoracic MRI August 31, 2019 again shows multiple metastatic lesions throughout the anterior and posterior aspects of the spine    11/1/2019 for spine standing EOS x-rays with lumbar flexion extension x-rays: Demonstrate slightly increased wedging of L1 compression fracture compared to previous x-ray.  Compression fracture appears stable without dynamic instability.  Full spine x-rays show preserved global alignment.             Assessment and Plan:   Assessment:  59 year old female with multiple metastatic lesions throughout cervical,  thoracic, and lumbar spine.  Patient also with suspected pathologic fracture of L1 without spinal or foraminal stenosis.       Plan:  Patient doing well with conservative treatment including bracing and pain medications.  Discussed nonoperative and operative treatment options with patient today.  She is already utilizing many of our recommended nonoperative treatment options, and having a lot of success with this.  Showed her her x-rays from today which show that the L1 compression fracture has settled a little, which is not uncommon.  It is likely that it will continue to heal in this position.  Explained to patient that the only reason to do surgery for this type of injury is if there is intolerable pain or an unstable fracture pattern.  Would recommend she continue with conservative management.  With her brace, she can start weaning off that as tolerated over the next 6 weeks.  The utility of the brace is usually best while the fracture is healing.  She can start by only wearing the brace for increased activities; does not need to wear it when she is sitting or relaxing in the house.  After the 12-week period she can stop wearing brace altogether. In terms of activity restrictions for this fracture: Would recommend that she continue doing daily activities as tolerated, and lift max of 20 pounds.  She does have other metastases throughout the spine that have the potential to develop a pathologic fracture.  I would recommend that she avoid high impact activities.  However, we do not want her to become deconditioned, so we would recommend low impact activities such as walking and recumbent biking.  We would also recommend working on bone health, but she says that she is already on a monthly injection for this through her oncologist.  She can follow-up with us as needed, or if she develops new or worsening symptoms.        Respectfully,  Bipin Elliott MD  Spine Surgery  [facility]  Answers for  HPI/ROS submitted by the patient on 11/1/2019   General Symptoms: Yes  Skin Symptoms: No  HENT Symptoms: No  EYE SYMPTOMS: Yes  HEART SYMPTOMS: No  LUNG SYMPTOMS: No  INTESTINAL SYMPTOMS: No  URINARY SYMPTOMS: No  GYNECOLOGIC SYMPTOMS: No  BREAST SYMPTOMS: No  SKELETAL SYMPTOMS: Yes  BLOOD SYMPTOMS: No  NERVOUS SYSTEM SYMPTOMS: Yes  MENTAL HEALTH SYMPTOMS: Yes  Fever: No  Loss of appetite: Yes  Weight loss: No  Weight gain: No  Fatigue: Yes  Night sweats: No  Chills: No  Increased stress: Yes  Excessive hunger: No  Excessive thirst: No  Feeling hot or cold when others believe the temperature is normal: No  Loss of height: No  Post-operative complications: No  Surgical site pain: No  Hallucinations: No  Change in or Loss of Energy: No  Hyperactivity: No  Confusion: No  Eye pain: No  Vision loss: Yes  Dry eyes: No  Watery eyes: No  Eye bulging: No  Double vision: No  Flashing of lights: Yes  Spots: Yes  Floaters: Yes  Redness: Yes  Crossed eyes: No  Tunnel Vision: No  Yellowing of eyes: No  Eye irritation: Yes  Back pain: Yes  Muscle aches: No  Neck pain: Yes  Swollen joints: No  Joint pain: No  Bone pain: No  Muscle cramps: No  Muscle weakness: No  Joint stiffness: No  Bone fracture: Yes  Trouble with coordination: No  Dizziness or trouble with balance: Yes  Fainting or black-out spells: No  Memory loss: No  Headache: Yes  Seizures: No  Speech problems: No  Tingling: Yes  Tremor: No  Weakness: Yes  Difficulty walking: Yes  Paralysis: No  Numbness: No  Nervous or Anxious: Yes  Depression: Yes  Trouble sleeping: Yes  Trouble thinking or concentrating: Yes  Mood changes: No  Panic attacks: Yes

## 2019-11-01 NOTE — NURSING NOTE
"Reason For Visit:   Chief Complaint   Patient presents with     Consult     mass on spine mainly in thoracic spine        Primary MD: Mohit Barcenas  Ref. MD: Self     ?  No  Occupation not working right now .  Currently working? No.  Work status?  On disability.  Date of injury: awhile ago   Type of injury: chronic .  Date of surgery: none   Type of surgery: none .  Smoker: No  Request smoking cessation information: No    Ht 1.575 m (5' 2\")   Wt 68 kg (150 lb)   BMI 27.44 kg/m      Pain Assessment  Patient Currently in Pain: Yes  0-10 Pain Scale: 5(fingers numb on left hand but Pt has MS)    Oswestry (SHOBHA) Questionnaire    OSWESTRY DISABILITY INDEX 11/1/2019   Count 10   Sum 17   Oswestry Score (%) 34   Some recent data might be hidden            Neck Disability Index (NDI) Questionnaire    No flowsheet data found.                Promis 10 Assessment    No flowsheet data found.             Mohit Vale, ATC  "

## 2019-11-01 NOTE — PROGRESS NOTES
Spine Surgery Consultation    REFERRING PHYSICIAN: Referred Self   PRIMARY CARE PHYSICIAN: Mohit Barcenas           Chief Complaint:   Consult (mass on spine mainly in thoracic spine )      History of Present Illness:  Symptom Profile Including: location of symptoms, onset, severity, exacerbating/alleviating factors, previous treatments:        Shaneka Alvarez is a 59 year old female who presents today for evaluation of ***    Past treatments tried:  - Physical therapy: ***  - Injections: ***  - Medications: ***    PMH:  Breast CA  Multiple Sclerosis  Migraines ***    Social:  ***         Past Medical History:     Past Medical History:   Diagnosis Date     Breast cancer (H)     Age 44     Common migraine      H/O bilateral mastectomy      Mild major depression (H)      Multiple sclerosis (H)             Past Surgical History:     Past Surgical History:   Procedure Laterality Date     ENDOBRONCHIAL ULTRASOUND FLEXIBLE N/A 2019    Procedure: Flexible Bronchoscopy, Endobronchial Ultrasound, Radial Probe;  Surgeon: Sree Sanchez MD;  Location: [facility] OR     HC REMOVAL OF OVARY/TUBE(S)       HERNIA REPAIR, UMBILICAL       mastectomy  Bilateral 2006     MASTECTOMY, BILATERAL              Social History:     Social History     Tobacco Use     Smoking status: Former Smoker     Types: Cigarettes     Last attempt to quit: 2005     Years since quittin.9     Smokeless tobacco: Never Used   Substance Use Topics     Alcohol use: Yes     Alcohol/week: 2.0 standard drinks     Types: 1 Glasses of wine, 1 Cans of beer per week     Comment: TWICE A WEEK            Family History:     Family History   Problem Relation Age of Onset     Breast Cancer Maternal Aunt 50         at 85     Breast Cancer Cousin 40     Breast Cancer Mother 49        2nd primary, contralateral breast at 69;  at 86     Melanoma Mother      Breast Cancer Maternal Grandmother 40     Ovarian Cancer Maternal Grandmother      Breast Cancer  "Paternal Grandmother 50         at 60     Brain Cancer Cousin 20     Breast Cancer Paternal Aunt 50         at 60     Breast Cancer Cousin 45        paternal cousin            Allergies:     Allergies   Allergen Reactions     Bactrim [Sulfamethoxazole W/Trimethoprim]      Internal bleeding     Copaxone [Glatiramer] Hives            Medications:     Current Outpatient Medications   Medication     acetaminophen (TYLENOL) 500 MG tablet     busPIRone (BUSPAR) 15 MG tablet     calcium citrate-vitamin D (CITRACAL) 315-250 MG-UNIT TABS     Cholecalciferol (VITAMIN D3 PO)     dexamethasone (DECADRON) 4 MG tablet     diclofenac (VOLTAREN) 1 % topical gel     erenumab-aooe (AIMOVIG 140 DOSE) 70 MG/ML injection     Lidocaine (LIDOCARE) 4 % Patch     magnesium 250 MG tablet     ondansetron (ZOFRAN-ODT) 8 MG ODT tab     oxyCODONE (ROXICODONE) 5 MG tablet     polyethylene glycol (MIRALAX/GLYCOLAX) packet     propranolol (INDERAL LA) 60 MG 24 hr capsule     ranitidine (ZANTAC) 150 MG tablet     senna-docusate (SENOKOT-S/PERICOLACE) 8.6-50 MG tablet     syringe/needle, disp, (B-D INTEGRA SYRINGE) 23G X 1\" 3 ML MISC     tolterodine ER (DETROL LA) 4 MG 24 hr capsule     traZODone (DESYREL) 50 MG tablet     venlafaxine (EFFEXOR-ER) 225 MG TB24 24 hr tablet     No current facility-administered medications for this visit.              Review of Systems:     A 10 point ROS was performed and reviewed. Specific responses to these questions are noted at the end of the document.         Physical Exam:     PHYSICAL EXAM:   Constitutional - Patient is healthy, well-nourished and appears stated age***.    Vitals: Ht 1.575 m (5' 2\")   Wt 68 kg (150 lb)   BMI 27.44 kg/m     Respiratory - Patient is breathing normally and in no respiratory distress***.   Skin - No suspicious rashes or lesions***.   Psychiatric - Normal mood and affect***.   Cardiovascular - Extremities warm and well perfused***.   Eyes - Visual acuity is normal to the " written word***.   ENT - Hearing intact to the spoken word***.   GI - No abdominal distention***.   Musculoskeletal - Non-antalgic gait without use of assistive devices***.        Cervical spine:    Appearance - Normal (loss of normal lordosis?***)    Palpation - Non-tender to palpation     (C-spine?, paraspinals?, trapezius?***)    ROM - Full      (Flex/ext, lat flexion, lat rotation, +/- pain?***)    Motor -     UPPER EXTREMITY Left Right    strength ***/5 ***/5   Finger ab/adduction ***/5 ***/5   Wrist flexion ***/5 ***/5   Wrist extension ***/5 ***/5   Elbow flexion ***/5 ***/5   Elbow extension ***/5 ***/5   Shoulder flexion ***/5 ***/5         Special Tests -      C-spine instability - Positive/Negative***     Cervical axial load - Positive/Negative***     Lhermitte's Test - Positive/Negative***     Spurling's Test - Positive/Negative***          Thoracic Spine:    Appearance - Normal (loss of normal kyphosis?***)    Palpation - Non-tender to palpation     (T-spine?, paraspinals?***)    Strength/ROM - deferred            Lumbar Spine:    Appearance - Normal (loss of normal lordosis?***)    Palpation - Non-tender to palpation    ROM - Full      (Flex/ext, lat flexion, lat rotation, +/- pain?***)    Motor -        LOWER EXTREMITY Left Right   Hip flexion ***/5 ***/5   Knee flexion ***/5 ***/5   Knee extension ***/5 ***/5   Ankle dorsiflexion ***/5 ***/5   Ankle plantarflexion ***/5 ***/5   Great toe extension ***/5 ***/5        Special tests -     Straight leg raise - Positive/Negative***     CONSUELO - Positive/Negative***     Pain with hip ROM - Positive/Negative***     Neurologic - Sensation intact to light touch bilaterally***.      REFLEXES Left Right   Biceps ***+ ***+   Triceps ***+ ***+   Brachioradialis ***+ ***+   Patella ***+ ***+   Ankle jerk ***+ ***+   Babinski Present? Present?     Hip Exam:  No pain with hip log roll and no tenderness over the greater trochanters.    Alignment:  Patient stands  with a neutral standing sagittal balance.         Imaging:   We ordered and independently reviewed new radiographs at this clinic visit. The results were discussed with the patient.  Findings include:    ***             Assessment and Plan:   Assessment:  59 year old female with ***     Plan:  1. ***      Sindy Weber PA-C    Respectfully,  Bipin Elliott MD  Spine Surgery  [facility]        Answers for HPI/ROS submitted by the patient on 11/1/2019   General Symptoms: Yes  Skin Symptoms: No  HENT Symptoms: No  EYE SYMPTOMS: Yes  HEART SYMPTOMS: No  LUNG SYMPTOMS: No  INTESTINAL SYMPTOMS: No  URINARY SYMPTOMS: No  GYNECOLOGIC SYMPTOMS: No  BREAST SYMPTOMS: No  SKELETAL SYMPTOMS: Yes  BLOOD SYMPTOMS: No  NERVOUS SYSTEM SYMPTOMS: Yes  MENTAL HEALTH SYMPTOMS: Yes  Fever: No  Loss of appetite: Yes  Weight loss: No  Weight gain: No  Fatigue: Yes  Night sweats: No  Chills: No  Increased stress: Yes  Excessive hunger: No  Excessive thirst: No  Feeling hot or cold when others believe the temperature is normal: No  Loss of height: No  Post-operative complications: No  Surgical site pain: No  Hallucinations: No  Change in or Loss of Energy: No  Hyperactivity: No  Confusion: No  Eye pain: No  Vision loss: Yes  Dry eyes: No  Watery eyes: No  Eye bulging: No  Double vision: No  Flashing of lights: Yes  Spots: Yes  Floaters: Yes  Redness: Yes  Crossed eyes: No  Tunnel Vision: No  Yellowing of eyes: No  Eye irritation: Yes  Back pain: Yes  Muscle aches: No  Neck pain: Yes  Swollen joints: No  Joint pain: No  Bone pain: No  Muscle cramps: No  Muscle weakness: No  Joint stiffness: No  Bone fracture: Yes  Trouble with coordination: No  Dizziness or trouble with balance: Yes  Fainting or black-out spells: No  Memory loss: No  Headache: Yes  Seizures: No  Speech problems: No  Tingling: Yes  Tremor: No  Weakness: Yes  Difficulty walking: Yes  Paralysis: No  Numbness: No  Nervous or Anxious: Yes  Depression:  Yes  Trouble sleeping: Yes  Trouble thinking or concentrating: Yes  Mood changes: No  Panic attacks: Yes

## 2019-11-01 NOTE — PROGRESS NOTES
Spine Surgery Consultation    REFERRING PHYSICIAN: Referred Self   PRIMARY CARE PHYSICIAN: Mohit Barcenas           Chief Complaint:   Consult (mass on spine mainly in thoracic spine )      History of Present Illness:  Symptom Profile Including: location of symptoms, onset, severity, exacerbating/alleviating factors, previous treatments:        Shaneka Alvarez is a 59 year old female who presents today to gain more information about the metastatic lesions in her spine.  Patient says that she has been dealing with pain in her back for about 2 years, but only found out that she had metastatic lesions in her spine about 2 months ago.  Patient says that pain is moderate, and is located throughout the back.  Sometimes worse in her neck, sometimes worse in her low back.  Today she says pain is mainly localized in her middle of her back.  Denies any radicular pain in upper and lower extremities.  She denies any new onset of weakness or numbness in limbs.  Says that she has long-standing weakness from multiple sclerosis throughout her right side.  Patient says that she has been controlling her pain with 2 oxycodone 5 mg tablets twice daily.  She is also been using a Gorge brace for support.  She says when she does not wear the brace, her back will feel achy.  She is currently on Taxol for chemotherapy treatment.  Recently went through palliative radiation from T12-L5 in September 2019.    Past medical history  Breast cancer, recently diagnosed with metastatic bone lesions  Multiple sclerosis, diagnosed 10 years ago. Stable.  Migraine HA    Social Hx:  Former , no longer working after recent metastatic diagnosis.  Lives in [place] with          Past Medical History:     Past Medical History:   Diagnosis Date     Breast cancer (H)     Age 44     Common migraine      H/O bilateral mastectomy      Mild major depression (H)      Multiple sclerosis (H)             Past Surgical History:     Past  Surgical History:   Procedure Laterality Date     ENDOBRONCHIAL ULTRASOUND FLEXIBLE N/A 2019    Procedure: Flexible Bronchoscopy, Endobronchial Ultrasound, Radial Probe;  Surgeon: Sree Sanchez MD;  Location: [facility] OR     HC REMOVAL OF OVARY/TUBE(S)       HERNIA REPAIR, UMBILICAL       mastectomy  Bilateral 2006     MASTECTOMY, BILATERAL              Social History:     Social History     Tobacco Use     Smoking status: Former Smoker     Types: Cigarettes     Last attempt to quit: 2005     Years since quittin.9     Smokeless tobacco: Never Used   Substance Use Topics     Alcohol use: Yes     Alcohol/week: 2.0 standard drinks     Types: 1 Glasses of wine, 1 Cans of beer per week     Comment: TWICE A WEEK            Family History:     Family History   Problem Relation Age of Onset     Breast Cancer Maternal Aunt 50         at 85     Breast Cancer Cousin 40     Breast Cancer Mother 49        2nd primary, contralateral breast at 69;  at 86     Melanoma Mother      Breast Cancer Maternal Grandmother 40     Ovarian Cancer Maternal Grandmother      Breast Cancer Paternal Grandmother 50         at 60     Brain Cancer Cousin 20     Breast Cancer Paternal Aunt 50         at 60     Breast Cancer Cousin 45        paternal cousin            Allergies:     Allergies   Allergen Reactions     Bactrim [Sulfamethoxazole W/Trimethoprim]      Internal bleeding     Copaxone [Glatiramer] Hives            Medications:     Current Outpatient Medications   Medication     acetaminophen (TYLENOL) 500 MG tablet     busPIRone (BUSPAR) 15 MG tablet     calcium citrate-vitamin D (CITRACAL) 315-250 MG-UNIT TABS     Cholecalciferol (VITAMIN D3 PO)     dexamethasone (DECADRON) 4 MG tablet     diclofenac (VOLTAREN) 1 % topical gel     erenumab-aooe (AIMOVIG 140 DOSE) 70 MG/ML injection     Lidocaine (LIDOCARE) 4 % Patch     magnesium 250 MG tablet     ondansetron (ZOFRAN-ODT) 8 MG ODT tab     oxyCODONE  "(ROXICODONE) 5 MG tablet     polyethylene glycol (MIRALAX/GLYCOLAX) packet     propranolol (INDERAL LA) 60 MG 24 hr capsule     ranitidine (ZANTAC) 150 MG tablet     senna-docusate (SENOKOT-S/PERICOLACE) 8.6-50 MG tablet     syringe/needle, disp, (B-D INTEGRA SYRINGE) 23G X 1\" 3 ML MISC     tolterodine ER (DETROL LA) 4 MG 24 hr capsule     traZODone (DESYREL) 50 MG tablet     venlafaxine (EFFEXOR-ER) 225 MG TB24 24 hr tablet     No current facility-administered medications for this visit.              Review of Systems:     A 10 point ROS was performed and reviewed. Specific responses to these questions are noted at the end of the document.         Physical Exam:   Vitals: Ht 1.575 m (5' 2\")   Wt 68 kg (150 lb)   BMI 27.44 kg/m    Constitutional: awake, alert, cooperative, no apparent distress, appears stated age.    Eyes: The sclera are white.  Ears, Nose, Throat: The trachea is midline.  Psychiatric: The patient has a normal affect.  Respiratory: breathing non-labored  Cardiovascular: The extremities are warm and perfused.  Skin: no obvious rashes or lesions.  Musculoskeletal, Neurologic, and Spine: Patient wearing a [place]orge brace in room.  Stands with neutral coronal and sagittal balance.  Normal gait.  Unable to do Tandem gait with ease.  Negative Romberg's.        Cervical spine:    Appearance - Normal    Palpation - Non-tender to palpation midline    Motor - slightly decreased strength throughout right arm; .    UPPER EXTREMITY Left Right    strength 5/5 5/5   Finger ab/adduction 5/5 5/5   Wrist flexion 5/5 4+/5   Wrist extension 5/5 4+/5   Elbow flexion 5/5 4+/5   Elbow extension 5/5 4+/5   Shoulder flexion 5/5 4+/5               Thoracic Spine:    Appearance - Normal     Palpation - Non-tender to palpation of midline or paraspinal muscles.  No point tenderness or step-offs.    Strength/ROM - deferred            Lumbar Spine:    Appearance - Normal     Palpation - Non-tender to palpation  to palpation of " midline or paraspinal muscles.  No point tenderness or step-offs.    ROM - Full     Motor -        LOWER EXTREMITY Left Right   Hip flexion 5/5 5/5   Knee flexion 5/5 5/5   Knee extension 5/5 5/5   Ankle dorsiflexion 5/5 5/5   Ankle plantarflexion 5/5 5/5   Great toe extension 5/5 5/5        Special tests -     Straight leg raise -negative bilateral     Neurologic - Sensation intact to light touch bilaterally.      REFLEXES Left Right   Biceps 1+ 1+   Triceps 1+ 1+   Brachioradialis 1+ 1+   Patella 1+ 1+   Ankle jerk 1+ 1+   Babinski  downgoing  downgoing            Imaging:   We ordered and independently reviewed new radiographs at this clinic visit. The results were discussed with the patient.  Findings include:    Cervical MRI dated September 2, 2019 shows multiple enhancing lesions throughout the cervical spine concerning for metastatic disease.      September 1, 2019 standing thoracolumbar films show bony destruction at L1 consistent with pathologic fracture in the setting of known metastatic disease.      August 31, 2019 lumbar CT scan shows probable metastatic involvement at L4 L2 and L1 and at L1 there is anterior wedging and kyphosis consistent with pathologic fracture     lumbar MRI August 31, 2019 again shows multiple metastatic lesions with suspected pathologic fracture at L1 no severe spinal canal narrowing mild to moderate left lateral recess at the level of L1     thoracic MRI August 31, 2019 again shows multiple metastatic lesions throughout the anterior and posterior aspects of the spine    11/1/2019 for spine standing EOS x-rays with lumbar flexion extension x-rays: Demonstrate slightly increased wedging of L1 compression fracture compared to previous x-ray.  Compression fracture appears stable without dynamic instability.  Full spine x-rays show preserved global alignment.             Assessment and Plan:   Assessment:  59 year old female with multiple metastatic lesions throughout cervical,  thoracic, and lumbar spine.  Patient also with suspected pathologic fracture of L1 without spinal or foraminal stenosis.       Plan:  Patient doing well with conservative treatment including bracing and pain medications.  Discussed nonoperative and operative treatment options with patient today.  She is already utilizing many of our recommended nonoperative treatment options, and having a lot of success with this.  Showed her her x-rays from today which show that the L1 compression fracture has settled a little, which is not uncommon.  It is likely that it will continue to heal in this position.  Explained to patient that the only reason to do surgery for this type of injury is if there is intolerable pain or an unstable fracture pattern.  Would recommend she continue with conservative management.  With her brace, she can start weaning off that as tolerated over the next 6 weeks.  The utility of the brace is usually best while the fracture is healing.  She can start by only wearing the brace for increased activities; does not need to wear it when she is sitting or relaxing in the house.  After the 12-week period she can stop wearing brace altogether. In terms of activity restrictions for this fracture: Would recommend that she continue doing daily activities as tolerated, and lift max of 20 pounds.  She does have other metastases throughout the spine that have the potential to develop a pathologic fracture.  I would recommend that she avoid high impact activities.  However, we do not want her to become deconditioned, so we would recommend low impact activities such as walking and recumbent biking.  We would also recommend working on bone health, but she says that she is already on a monthly injection for this through her oncologist.  She can follow-up with us as needed, or if she develops new or worsening symptoms.        Respectfully,  Bipin Elliott MD  Spine Surgery  [facility]      Answers  for HPI/ROS submitted by the patient on 11/1/2019   General Symptoms: Yes  Skin Symptoms: No  HENT Symptoms: No  EYE SYMPTOMS: Yes  HEART SYMPTOMS: No  LUNG SYMPTOMS: No  INTESTINAL SYMPTOMS: No  URINARY SYMPTOMS: No  GYNECOLOGIC SYMPTOMS: No  BREAST SYMPTOMS: No  SKELETAL SYMPTOMS: Yes  BLOOD SYMPTOMS: No  NERVOUS SYSTEM SYMPTOMS: Yes  MENTAL HEALTH SYMPTOMS: Yes  Fever: No  Loss of appetite: Yes  Weight loss: No  Weight gain: No  Fatigue: Yes  Night sweats: No  Chills: No  Increased stress: Yes  Excessive hunger: No  Excessive thirst: No  Feeling hot or cold when others believe the temperature is normal: No  Loss of height: No  Post-operative complications: No  Surgical site pain: No  Hallucinations: No  Change in or Loss of Energy: No  Hyperactivity: No  Confusion: No  Eye pain: No  Vision loss: Yes  Dry eyes: No  Watery eyes: No  Eye bulging: No  Double vision: No  Flashing of lights: Yes  Spots: Yes  Floaters: Yes  Redness: Yes  Crossed eyes: No  Tunnel Vision: No  Yellowing of eyes: No  Eye irritation: Yes  Back pain: Yes  Muscle aches: No  Neck pain: Yes  Swollen joints: No  Joint pain: No  Bone pain: No  Muscle cramps: No  Muscle weakness: No  Joint stiffness: No  Bone fracture: Yes  Trouble with coordination: No  Dizziness or trouble with balance: Yes  Fainting or black-out spells: No  Memory loss: No  Headache: Yes  Seizures: No  Speech problems: No  Tingling: Yes  Tremor: No  Weakness: Yes  Difficulty walking: Yes  Paralysis: No  Numbness: No  Nervous or Anxious: Yes  Depression: Yes  Trouble sleeping: Yes  Trouble thinking or concentrating: Yes  Mood changes: No  Panic attacks: Yes

## 2019-11-01 NOTE — LETTER
11/1/2019       RE: Shaneka Alvarez  [address] [facility] [address]     Dear Colleague,    Thank you for referring your patient, Shaneka Alvarez, to the [facility] at [facility]. Please see a copy of my visit note below.    Spine Surgery Consultation    REFERRING PHYSICIAN: Referred Self   PRIMARY CARE PHYSICIAN: Mohit Barcenas           Chief Complaint:   Consult (mass on spine mainly in thoracic spine )      History of Present Illness:  Symptom Profile Including: location of symptoms, onset, severity, exacerbating/alleviating factors, previous treatments:        Shaneka Alvarez is a 59 year old female who presents today to gain more information about the metastatic lesions in her spine.  Patient says that she has been dealing with pain in her back for about 2 years, but only found out that she had metastatic lesions in her spine about 2 months ago.  Patient says that pain is moderate, and is located throughout the back.  Sometimes worse in her neck, sometimes worse in her low back.  Today she says pain is mainly localized in her middle of her back.  Denies any radicular pain in upper and lower extremities.  She denies any new onset of weakness or numbness in limbs.  Says that she has long-standing weakness from multiple sclerosis throughout her right side.  Patient says that she has been controlling her pain with 2 oxycodone 5 mg tablets twice daily.  She is also been using a Gorge brace for support.  She says when she does not wear the brace, her back will feel achy.  She is currently on Taxol for chemotherapy treatment.  Recently went through palliative radiation from T12-L5 in September 2019.    Past medical history  Breast cancer, recently diagnosed with metastatic bone lesions  Multiple sclerosis, diagnosed 10 years ago. Stable.  Migraine HA    Social Hx:  Former , no longer working after recent metastatic diagnosis.  L[place] in  [place] with          Past Medical History:     Past Medical History:   Diagnosis Date     Breast cancer (H)     Age 44     Common migraine      H/O bilateral mastectomy      Mild major depression (H)      Multiple sclerosis (H)             Past Surgical History:     Past Surgical History:   Procedure Laterality Date     ENDOBRONCHIAL ULTRASOUND FLEXIBLE N/A 2019    Procedure: Flexible Bronchoscopy, Endobronchial Ultrasound, Radial Probe;  Surgeon: Sree Sanchez MD;  Location: [facility] OR     HC REMOVAL OF OVARY/TUBE(S)       HERNIA REPAIR, UMBILICAL       mastectomy  Bilateral 2006     MASTECTOMY, BILATERAL              Social History:     Social History     Tobacco Use     Smoking status: Former Smoker     Types: Cigarettes     Last attempt to quit: 2005     Years since quittin.9     Smokeless tobacco: Never Used   Substance Use Topics     Alcohol use: Yes     Alcohol/week: 2.0 standard drinks     Types: 1 Glasses of wine, 1 Cans of beer per week     Comment: TWICE A WEEK            Family History:     Family History   Problem Relation Age of Onset     Breast Cancer Maternal Aunt 50         at 85     Breast Cancer Cousin 40     Breast Cancer Mother 49        2nd primary, contralateral breast at 69;  at 86     Melanoma Mother      Breast Cancer Maternal Grandmother 40     Ovarian Cancer Maternal Grandmother      Breast Cancer Paternal Grandmother 50         at 60     Brain Cancer Cousin 20     Breast Cancer Paternal Aunt 50         at 60     Breast Cancer Cousin 45        paternal cousin            Allergies:     Allergies   Allergen Reactions     Bactrim [Sulfamethoxazole W/Trimethoprim]      Internal bleeding     Copaxone [Glatiramer] Hives            Medications:     Current Outpatient Medications   Medication     acetaminophen (TYLENOL) 500 MG tablet     busPIRone (BUSPAR) 15 MG tablet     calcium citrate-vitamin D (CITRACAL) 315-250 MG-UNIT TABS     Cholecalciferol  "(VITAMIN D3 PO)     dexamethasone (DECADRON) 4 MG tablet     diclofenac (VOLTAREN) 1 % topical gel     erenumab-aooe (AIMOVIG 140 DOSE) 70 MG/ML injection     Lidocaine (LIDOCARE) 4 % Patch     magnesium 250 MG tablet     ondansetron (ZOFRAN-ODT) 8 MG ODT tab     oxyCODONE (ROXICODONE) 5 MG tablet     polyethylene glycol (MIRALAX/GLYCOLAX) packet     propranolol (INDERAL LA) 60 MG 24 hr capsule     ranitidine (ZANTAC) 150 MG tablet     senna-docusate (SENOKOT-S/PERICOLACE) 8.6-50 MG tablet     syringe/needle, disp, (B-D INTEGRA SYRINGE) 23G X 1\" 3 ML MISC     tolterodine ER (DETROL LA) 4 MG 24 hr capsule     traZODone (DESYREL) 50 MG tablet     venlafaxine (EFFEXOR-ER) 225 MG TB24 24 hr tablet     No current facility-administered medications for this visit.              Review of Systems:     A 10 point ROS was performed and reviewed. Specific responses to these questions are noted at the end of the document.         Physical Exam:   Vitals: Ht 1.575 m (5' 2\")   Wt 68 kg (150 lb)   BMI 27.44 kg/m    Constitutional: awake, alert, cooperative, no apparent distress, appears stated age.    Eyes: The sclera are white.  Ears, Nose, Throat: The trachea is midline.  Psychiatric: The patient has a normal affect.  Respiratory: breathing non-labored  Cardiovascular: The extremities are warm and perfused.  Skin: no obvious rashes or lesions.  Musculoskeletal, Neurologic, and Spine: Patient wearing a [place] brace in room.  Stands with neutral coronal and sagittal balance.  Normal gait.  Unable to do Tandem gait with ease.  Negative Romberg's.        Cervical spine:    Appearance - Normal    Palpation - Non-tender to palpation midline    Motor - slightly decreased strength throughout right arm; .    UPPER EXTREMITY Left Right    strength 5/5 5/5   Finger ab/adduction 5/5 5/5   Wrist flexion 5/5 4+/5   Wrist extension 5/5 4+/5   Elbow flexion 5/5 4+/5   Elbow extension 5/5 4+/5   Shoulder flexion 5/5 4+/5               " Thoracic Spine:    Appearance - Normal     Palpation - Non-tender to palpation of midline or paraspinal muscles.  No point tenderness or step-offs.    Strength/ROM - deferred            Lumbar Spine:    Appearance - Normal     Palpation - Non-tender to palpation  to palpation of midline or paraspinal muscles.  No point tenderness or step-offs.    ROM - Full     Motor -        LOWER EXTREMITY Left Right   Hip flexion 5/5 5/5   Knee flexion 5/5 5/5   Knee extension 5/5 5/5   Ankle dorsiflexion 5/5 5/5   Ankle plantarflexion 5/5 5/5   Great toe extension 5/5 5/5        Special tests -     Straight leg raise -negative bilateral     Neurologic - Sensation intact to light touch bilaterally.      REFLEXES Left Right   Biceps 1+ 1+   Triceps 1+ 1+   Brachioradialis 1+ 1+   Patella 1+ 1+   Ankle jerk 1+ 1+   Babinski  downgoing  downgoing            Imaging:   We ordered and independently reviewed new radiographs at this clinic visit. The results were discussed with the patient.  Findings include:    Cervical MRI dated September 2, 2019 shows multiple enhancing lesions throughout the cervical spine concerning for metastatic disease.      September 1, 2019 standing thoracolumbar films show bony destruction at L1 consistent with pathologic fracture in the setting of known metastatic disease.      August 31, 2019 lumbar CT scan shows probable metastatic involvement at L4 L2 and L1 and at L1 there is anterior wedging and kyphosis consistent with pathologic fracture     lumbar MRI August 31, 2019 again shows multiple metastatic lesions with suspected pathologic fracture at L1 no severe spinal canal narrowing mild to moderate left lateral recess at the level of L1     thoracic MRI August 31, 2019 again shows multiple metastatic lesions throughout the anterior and posterior aspects of the spine    11/1/2019 for spine standing EOS x-rays with lumbar flexion extension x-rays: Demonstrate slightly increased wedging of L1 compression  fracture compared to previous x-ray.  Compression fracture appears stable without dynamic instability.  Full spine x-rays show preserved global alignment.             Assessment and Plan:   Assessment:  59 year old female with multiple metastatic lesions throughout cervical, thoracic, and lumbar spine.  Patient also with suspected pathologic fracture of L1 without spinal or foraminal stenosis.       Plan:  Patient doing well with conservative treatment including bracing and pain medications.  Discussed nonoperative and operative treatment options with patient today.  She is already utilizing many of our recommended nonoperative treatment options, and having a lot of success with this.  Showed her her x-rays from today which show that the L1 compression fracture has settled a little, which is not uncommon.  It is likely that it will continue to heal in this position.  Explained to patient that the only reason to do surgery for this type of injury is if there is intolerable pain or an unstable fracture pattern.  Would recommend she continue with conservative management.  With her brace, she can start weaning off that as tolerated over the next 6 weeks.  The utility of the brace is usually best while the fracture is healing.  She can start by only wearing the brace for increased activities; does not need to wear it when she is sitting or relaxing in the house.  After the 12-week period she can stop wearing brace altogether. In terms of activity restrictions for this fracture: Would recommend that she continue doing daily activities as tolerated, and lift max of 20 pounds.  She does have other metastases throughout the spine that have the potential to develop a pathologic fracture.  I would recommend that she avoid high impact activities.  However, we do not want her to become deconditioned, so we would recommend low impact activities such as walking and recumbent biking.  We would also recommend working on bone health,  but she says that she is already on a monthly injection for this through her oncologist.  She can follow-up with us as needed, or if she develops new or worsening symptoms.    Respectfully,  Bipin Elliott MD  Spine Surgery  [facility]

## 2019-11-02 LAB
MYCOBACTERIUM SPEC CULT: NORMAL
MYCOBACTERIUM SPEC CULT: NORMAL
SPECIMEN SOURCE: NORMAL

## 2019-11-04 ENCOUNTER — TELEPHONE (OUTPATIENT)
Dept: PHARMACY | Facility: CLINIC | Age: 59
End: 2019-11-04

## 2019-11-04 ENCOUNTER — PATIENT OUTREACH (OUTPATIENT)
Dept: ONCOLOGY | Facility: CLINIC | Age: 59
End: 2019-11-04

## 2019-11-04 DIAGNOSIS — C50.919 METASTATIC BREAST CANCER: Primary | ICD-10-CM

## 2019-11-04 DIAGNOSIS — C50.912 BILATERAL MALIGNANT NEOPLASM OF BREAST IN FEMALE, UNSPECIFIED ESTROGEN RECEPTOR STATUS, UNSPECIFIED SITE OF BREAST (H): ICD-10-CM

## 2019-11-04 DIAGNOSIS — C50.911 BILATERAL MALIGNANT NEOPLASM OF BREAST IN FEMALE, UNSPECIFIED ESTROGEN RECEPTOR STATUS, UNSPECIFIED SITE OF BREAST (H): ICD-10-CM

## 2019-11-04 RX ORDER — LORAZEPAM 0.5 MG/1
0.5 TABLET ORAL EVERY 4 HOURS PRN
Qty: 30 TABLET | Refills: 2 | Status: SHIPPED | OUTPATIENT
Start: 2019-11-04 | End: 2020-01-28

## 2019-11-04 RX ORDER — PROCHLORPERAZINE MALEATE 10 MG
10 TABLET ORAL EVERY 6 HOURS PRN
Qty: 30 TABLET | Refills: 2 | Status: SHIPPED | OUTPATIENT
Start: 2019-11-04 | End: 2020-01-08

## 2019-11-04 NOTE — PROGRESS NOTES
Call placed to patient.  She is doing fairly well.  Had a little diarrhea but she also has had a migraine the past couple of days and she states she was late on giving herself an injection.  She saw the spine surgeon; she can wean off her brace and he had not further recommendations.  Needs Rx for Oxycodone; will be in clinic tomorrow for chemotherapy.

## 2019-11-05 ENCOUNTER — ONCOLOGY VISIT (OUTPATIENT)
Dept: ONCOLOGY | Facility: CLINIC | Age: 59
End: 2019-11-05
Payer: COMMERCIAL

## 2019-11-05 ENCOUNTER — INFUSION THERAPY VISIT (OUTPATIENT)
Dept: INFUSION THERAPY | Facility: CLINIC | Age: 59
End: 2019-11-05
Attending: INTERNAL MEDICINE
Payer: COMMERCIAL

## 2019-11-05 VITALS
TEMPERATURE: 98.2 F | DIASTOLIC BLOOD PRESSURE: 81 MMHG | OXYGEN SATURATION: 98 % | RESPIRATION RATE: 18 BRPM | SYSTOLIC BLOOD PRESSURE: 113 MMHG | HEART RATE: 92 BPM | WEIGHT: 148.4 LBS | BODY MASS INDEX: 27.14 KG/M2

## 2019-11-05 DIAGNOSIS — C50.912 BILATERAL MALIGNANT NEOPLASM OF BREAST IN FEMALE, UNSPECIFIED ESTROGEN RECEPTOR STATUS, UNSPECIFIED SITE OF BREAST (H): ICD-10-CM

## 2019-11-05 DIAGNOSIS — C79.51 CARCINOMA OF BREAST METASTATIC TO BONE, UNSPECIFIED LATERALITY (H): ICD-10-CM

## 2019-11-05 DIAGNOSIS — C50.911 BILATERAL MALIGNANT NEOPLASM OF BREAST IN FEMALE, UNSPECIFIED ESTROGEN RECEPTOR STATUS, UNSPECIFIED SITE OF BREAST (H): ICD-10-CM

## 2019-11-05 DIAGNOSIS — C50.919 METASTATIC BREAST CANCER: Primary | ICD-10-CM

## 2019-11-05 DIAGNOSIS — C50.919 CARCINOMA OF BREAST METASTATIC TO BONE, UNSPECIFIED LATERALITY (H): ICD-10-CM

## 2019-11-05 DIAGNOSIS — C50.919 METASTATIC BREAST CANCER: ICD-10-CM

## 2019-11-05 LAB
ALBUMIN SERPL-MCNC: 3.4 G/DL (ref 3.4–5)
ALP SERPL-CCNC: 130 U/L (ref 40–150)
ALT SERPL W P-5'-P-CCNC: 37 U/L (ref 0–50)
ANION GAP SERPL CALCULATED.3IONS-SCNC: 6 MMOL/L (ref 3–14)
AST SERPL W P-5'-P-CCNC: 27 U/L (ref 0–45)
BASOPHILS # BLD AUTO: 0.1 10E9/L (ref 0–0.2)
BASOPHILS NFR BLD AUTO: 1 %
BILIRUB SERPL-MCNC: 0.5 MG/DL (ref 0.2–1.3)
BUN SERPL-MCNC: 10 MG/DL (ref 7–30)
CALCIUM SERPL-MCNC: 9 MG/DL (ref 8.5–10.1)
CANCER AG27-29 SERPL-ACNC: 149 U/ML (ref 0–39)
CHLORIDE SERPL-SCNC: 100 MMOL/L (ref 94–109)
CO2 SERPL-SCNC: 27 MMOL/L (ref 20–32)
CREAT SERPL-MCNC: 0.7 MG/DL (ref 0.52–1.04)
DIFFERENTIAL METHOD BLD: ABNORMAL
EOSINOPHIL # BLD AUTO: 0.5 10E9/L (ref 0–0.7)
EOSINOPHIL NFR BLD AUTO: 9 %
ERYTHROCYTE [DISTWIDTH] IN BLOOD BY AUTOMATED COUNT: 15.1 % (ref 10–15)
GFR SERPL CREATININE-BSD FRML MDRD: >90 ML/MIN/{1.73_M2}
GLUCOSE SERPL-MCNC: 127 MG/DL (ref 70–99)
HCT VFR BLD AUTO: 41.2 % (ref 35–47)
HGB BLD-MCNC: 13.9 G/DL (ref 11.7–15.7)
IMM GRANULOCYTES # BLD: 0 10E9/L (ref 0–0.4)
IMM GRANULOCYTES NFR BLD: 0.7 %
LYMPHOCYTES # BLD AUTO: 0.6 10E9/L (ref 0.8–5.3)
LYMPHOCYTES NFR BLD AUTO: 10.1 %
MCH RBC QN AUTO: 29.8 PG (ref 26.5–33)
MCHC RBC AUTO-ENTMCNC: 33.7 G/DL (ref 31.5–36.5)
MCV RBC AUTO: 88 FL (ref 78–100)
MONOCYTES # BLD AUTO: 0.6 10E9/L (ref 0–1.3)
MONOCYTES NFR BLD AUTO: 9.9 %
NEUTROPHILS # BLD AUTO: 4 10E9/L (ref 1.6–8.3)
NEUTROPHILS NFR BLD AUTO: 69.3 %
PLATELET # BLD AUTO: 239 10E9/L (ref 150–450)
POTASSIUM SERPL-SCNC: 3.5 MMOL/L (ref 3.4–5.3)
PROT SERPL-MCNC: 7.1 G/DL (ref 6.8–8.8)
RBC # BLD AUTO: 4.67 10E12/L (ref 3.8–5.2)
SODIUM SERPL-SCNC: 133 MMOL/L (ref 133–144)
WBC # BLD AUTO: 5.8 10E9/L (ref 4–11)

## 2019-11-05 PROCEDURE — 99207 ZZC NO CHARGE NURSE ONLY: CPT

## 2019-11-05 PROCEDURE — 96375 TX/PRO/DX INJ NEW DRUG ADDON: CPT | Performed by: INTERNAL MEDICINE

## 2019-11-05 PROCEDURE — 96367 TX/PROPH/DG ADDL SEQ IV INF: CPT | Performed by: INTERNAL MEDICINE

## 2019-11-05 PROCEDURE — 86300 IMMUNOASSAY TUMOR CA 15-3: CPT | Performed by: INTERNAL MEDICINE

## 2019-11-05 PROCEDURE — 80053 COMPREHEN METABOLIC PANEL: CPT | Performed by: INTERNAL MEDICINE

## 2019-11-05 PROCEDURE — 96413 CHEMO IV INFUSION 1 HR: CPT | Performed by: INTERNAL MEDICINE

## 2019-11-05 PROCEDURE — S0028 INJECTION, FAMOTIDINE, 20 MG: HCPCS | Performed by: INTERNAL MEDICINE

## 2019-11-05 PROCEDURE — 85025 COMPLETE CBC W/AUTO DIFF WBC: CPT | Performed by: INTERNAL MEDICINE

## 2019-11-05 RX ORDER — HEPARIN SODIUM (PORCINE) LOCK FLUSH IV SOLN 100 UNIT/ML 100 UNIT/ML
5 SOLUTION INTRAVENOUS
Status: DISCONTINUED | OUTPATIENT
Start: 2019-11-05 | End: 2019-11-05 | Stop reason: HOSPADM

## 2019-11-05 RX ORDER — OXYCODONE HYDROCHLORIDE 5 MG/1
5-10 TABLET ORAL EVERY 4 HOURS PRN
Qty: 100 TABLET | Refills: 0 | Status: SHIPPED | OUTPATIENT
Start: 2019-11-05 | End: 2019-12-04

## 2019-11-05 RX ORDER — HEPARIN SODIUM (PORCINE) LOCK FLUSH IV SOLN 100 UNIT/ML 100 UNIT/ML
5 SOLUTION INTRAVENOUS ONCE
Status: COMPLETED | OUTPATIENT
Start: 2019-11-05 | End: 2019-11-05

## 2019-11-05 RX ADMIN — HEPARIN SODIUM (PORCINE) LOCK FLUSH IV SOLN 100 UNIT/ML 5 ML: 100 SOLUTION at 17:06

## 2019-11-05 RX ADMIN — Medication 250 ML: at 15:03

## 2019-11-05 RX ADMIN — HEPARIN SODIUM (PORCINE) LOCK FLUSH IV SOLN 100 UNIT/ML 5 ML: 100 SOLUTION at 13:58

## 2019-11-05 NOTE — PROGRESS NOTES
Infusion Nursing Note:  Shaneka Alvarez presents today for C1,D1 of Taxol.    Patient seen by provider today: No   present during visit today: Not Applicable.    Note: Pt has had chemotherapy before - states she does not remember having any problems with her Taxol back in 2006 - reoriented to the infusion center, plan of car and schedule.  All questions answered - pt able to talk with Antonietta Arana RN Care Coordinator and Stevan Glez from pharmacy.    Intravenous Access:  Implanted Port.    Treatment Conditions:  Lab Results   Component Value Date    HGB 13.9 11/05/2019     Lab Results   Component Value Date    WBC 5.8 11/05/2019      Lab Results   Component Value Date    ANEU 4.0 11/05/2019     Lab Results   Component Value Date     11/05/2019      Lab Results   Component Value Date     11/05/2019                   Lab Results   Component Value Date    POTASSIUM 3.5 11/05/2019           No results found for: MAG         Lab Results   Component Value Date    CR 0.70 11/05/2019                   Lab Results   Component Value Date    RAFAELA 9.0 11/05/2019                Lab Results   Component Value Date    BILITOTAL 0.5 11/05/2019           Lab Results   Component Value Date    ALBUMIN 3.4 11/05/2019                    Lab Results   Component Value Date    ALT 37 11/05/2019           Lab Results   Component Value Date    AST 27 11/05/2019       Results reviewed, labs MET treatment parameters, ok to proceed with treatment.      Post Infusion Assessment:  Patient tolerated infusion without incident.  Blood return noted pre and post infusion.  Site patent and intact, free from redness, edema or discomfort.  No evidence of extravasations.  Access discontinued per protocol.       Discharge Plan:   Return in 11/12/19 for Taxol.  Discharge instructions reviewed with: Patient and friend.  Patient and/or family verbalized understanding of discharge instructions and all questions answered.  Patient discharged  in stable condition accompanied by: self.  Departure Mode: Ambulatory.    Loren Costa RN

## 2019-11-05 NOTE — PROGRESS NOTES
"Patient's name and  were verified.  See Doc Flowsheet - IV assess for details.  IVAD accessed with 20G  3/4\" mcnair gripper plus needle  blood return positive: YES  Site without redness, tenderness or swelling: YES  flushed with 30cc NS and 5cc 100u/ml heparin  Needle: Left accessed for infusion    Comments: Labs drawn.  Patient tolerated procedure without incident.    Catarina Mendoza  RN, BSN, OCN        "

## 2019-11-12 ENCOUNTER — ONCOLOGY VISIT (OUTPATIENT)
Dept: ONCOLOGY | Facility: CLINIC | Age: 59
End: 2019-11-12
Payer: COMMERCIAL

## 2019-11-12 ENCOUNTER — INFUSION THERAPY VISIT (OUTPATIENT)
Dept: INFUSION THERAPY | Facility: CLINIC | Age: 59
End: 2019-11-12
Attending: INTERNAL MEDICINE
Payer: COMMERCIAL

## 2019-11-12 VITALS
SYSTOLIC BLOOD PRESSURE: 103 MMHG | RESPIRATION RATE: 16 BRPM | WEIGHT: 144 LBS | DIASTOLIC BLOOD PRESSURE: 72 MMHG | TEMPERATURE: 97.2 F | HEART RATE: 105 BPM | OXYGEN SATURATION: 98 % | BODY MASS INDEX: 26.34 KG/M2

## 2019-11-12 DIAGNOSIS — C50.912 BILATERAL MALIGNANT NEOPLASM OF BREAST IN FEMALE, UNSPECIFIED ESTROGEN RECEPTOR STATUS, UNSPECIFIED SITE OF BREAST (H): ICD-10-CM

## 2019-11-12 DIAGNOSIS — C50.911 BILATERAL MALIGNANT NEOPLASM OF BREAST IN FEMALE, UNSPECIFIED ESTROGEN RECEPTOR STATUS, UNSPECIFIED SITE OF BREAST (H): ICD-10-CM

## 2019-11-12 DIAGNOSIS — C50.919 METASTATIC BREAST CANCER: Primary | ICD-10-CM

## 2019-11-12 LAB
BASOPHILS # BLD AUTO: 0.1 10E9/L (ref 0–0.2)
BASOPHILS NFR BLD AUTO: 0.8 %
DIFFERENTIAL METHOD BLD: ABNORMAL
EOSINOPHIL # BLD AUTO: 0.2 10E9/L (ref 0–0.7)
EOSINOPHIL NFR BLD AUTO: 2.6 %
ERYTHROCYTE [DISTWIDTH] IN BLOOD BY AUTOMATED COUNT: 14.8 % (ref 10–15)
HCT VFR BLD AUTO: 43.3 % (ref 35–47)
HGB BLD-MCNC: 14.5 G/DL (ref 11.7–15.7)
IMM GRANULOCYTES # BLD: 0.1 10E9/L (ref 0–0.4)
IMM GRANULOCYTES NFR BLD: 0.8 %
LYMPHOCYTES # BLD AUTO: 0.4 10E9/L (ref 0.8–5.3)
LYMPHOCYTES NFR BLD AUTO: 5.7 %
MCH RBC QN AUTO: 30 PG (ref 26.5–33)
MCHC RBC AUTO-ENTMCNC: 33.5 G/DL (ref 31.5–36.5)
MCV RBC AUTO: 90 FL (ref 78–100)
MONOCYTES # BLD AUTO: 0.3 10E9/L (ref 0–1.3)
MONOCYTES NFR BLD AUTO: 4.2 %
NEUTROPHILS # BLD AUTO: 6.3 10E9/L (ref 1.6–8.3)
NEUTROPHILS NFR BLD AUTO: 85.9 %
PLATELET # BLD AUTO: 248 10E9/L (ref 150–450)
RBC # BLD AUTO: 4.84 10E12/L (ref 3.8–5.2)
WBC # BLD AUTO: 7.4 10E9/L (ref 4–11)

## 2019-11-12 PROCEDURE — 85025 COMPLETE CBC W/AUTO DIFF WBC: CPT | Performed by: INTERNAL MEDICINE

## 2019-11-12 PROCEDURE — S0028 INJECTION, FAMOTIDINE, 20 MG: HCPCS | Performed by: INTERNAL MEDICINE

## 2019-11-12 PROCEDURE — 96367 TX/PROPH/DG ADDL SEQ IV INF: CPT | Performed by: INTERNAL MEDICINE

## 2019-11-12 PROCEDURE — 99207 ZZC NO CHARGE LOS: CPT

## 2019-11-12 PROCEDURE — 96375 TX/PRO/DX INJ NEW DRUG ADDON: CPT | Performed by: INTERNAL MEDICINE

## 2019-11-12 PROCEDURE — 96413 CHEMO IV INFUSION 1 HR: CPT | Performed by: INTERNAL MEDICINE

## 2019-11-12 RX ORDER — DIPHENHYDRAMINE HCL 25 MG
50 CAPSULE ORAL ONCE
Status: COMPLETED | OUTPATIENT
Start: 2019-11-12 | End: 2019-11-12

## 2019-11-12 RX ORDER — HEPARIN SODIUM (PORCINE) LOCK FLUSH IV SOLN 100 UNIT/ML 100 UNIT/ML
5 SOLUTION INTRAVENOUS
Status: DISCONTINUED | OUTPATIENT
Start: 2019-11-12 | End: 2019-11-13 | Stop reason: HOSPADM

## 2019-11-12 RX ADMIN — Medication 50 MG: at 15:15

## 2019-11-12 RX ADMIN — Medication 250 ML: at 15:20

## 2019-11-12 RX ADMIN — HEPARIN SODIUM (PORCINE) LOCK FLUSH IV SOLN 100 UNIT/ML 5 ML: 100 SOLUTION at 14:44

## 2019-11-12 ASSESSMENT — PAIN SCALES - GENERAL: PAINLEVEL: NO PAIN (0)

## 2019-11-12 NOTE — PROGRESS NOTES
Infusion Nursing Note:  Shaneka Alvarez presents today for C1 D8 Taxol.    Patient seen by provider today: No   present during visit today: Not Applicable.    Note: N/A.    Intravenous Access:  Implanted Port.    Treatment Conditions:  Lab Results   Component Value Date    HGB 14.5 11/12/2019     Lab Results   Component Value Date    WBC 7.4 11/12/2019      Lab Results   Component Value Date    ANEU 6.3 11/12/2019     Lab Results   Component Value Date     11/12/2019      Lab Results   Component Value Date     11/05/2019                   Lab Results   Component Value Date    POTASSIUM 3.5 11/05/2019           No results found for: MAG         Lab Results   Component Value Date    CR 0.70 11/05/2019                   Lab Results   Component Value Date    RAFAELA 9.0 11/05/2019                Lab Results   Component Value Date    BILITOTAL 0.5 11/05/2019           Lab Results   Component Value Date    ALBUMIN 3.4 11/05/2019                    Lab Results   Component Value Date    ALT 37 11/05/2019           Lab Results   Component Value Date    AST 27 11/05/2019       Results reviewed, labs MET treatment parameters, ok to proceed with treatment.      Post Infusion Assessment:  Patient tolerated infusion without incident.  Site patent and intact, free from redness, edema or discomfort.  No evidence of extravasations.  Access discontinued per protocol.       Discharge Plan:   Discharge instructions reviewed with: Patient.  Patient and/or family verbalized understanding of discharge instructions and all questions answered.  Patient discharged in stable condition accompanied by: self.  Departure Mode: Ambulatory.    Yanci Kate RN

## 2019-11-12 NOTE — PROGRESS NOTES
"Patient's name and  were verified.  See Doc Flowsheet - IV assess for details.  IVAD accessed with 20G 1\" mcnair gripper plus needle  blood return positive: YES  Site without redness, tenderness or swelling: YES  flushed with 20cc NS and 5cc 100u/ml heparin  Comments: Labs drawn.  Patient tolerated procedure without incident.  Cristina Ceron RN, BSN, OCN  "

## 2019-11-19 ENCOUNTER — TELEPHONE (OUTPATIENT)
Dept: ONCOLOGY | Facility: CLINIC | Age: 59
End: 2019-11-19

## 2019-11-19 ENCOUNTER — INFUSION THERAPY VISIT (OUTPATIENT)
Dept: INFUSION THERAPY | Facility: CLINIC | Age: 59
End: 2019-11-19
Attending: INTERNAL MEDICINE
Payer: COMMERCIAL

## 2019-11-19 ENCOUNTER — ONCOLOGY VISIT (OUTPATIENT)
Dept: ONCOLOGY | Facility: CLINIC | Age: 59
End: 2019-11-19
Payer: COMMERCIAL

## 2019-11-19 VITALS
OXYGEN SATURATION: 95 % | HEART RATE: 119 BPM | TEMPERATURE: 98.1 F | WEIGHT: 140 LBS | SYSTOLIC BLOOD PRESSURE: 125 MMHG | DIASTOLIC BLOOD PRESSURE: 86 MMHG | BODY MASS INDEX: 25.61 KG/M2 | RESPIRATION RATE: 16 BRPM

## 2019-11-19 DIAGNOSIS — C50.919 METASTATIC BREAST CANCER: Primary | ICD-10-CM

## 2019-11-19 DIAGNOSIS — C50.911 BILATERAL MALIGNANT NEOPLASM OF BREAST IN FEMALE, UNSPECIFIED ESTROGEN RECEPTOR STATUS, UNSPECIFIED SITE OF BREAST (H): ICD-10-CM

## 2019-11-19 DIAGNOSIS — C50.912 BILATERAL MALIGNANT NEOPLASM OF BREAST IN FEMALE, UNSPECIFIED ESTROGEN RECEPTOR STATUS, UNSPECIFIED SITE OF BREAST (H): ICD-10-CM

## 2019-11-19 LAB
BASOPHILS # BLD AUTO: 0.1 10E9/L (ref 0–0.2)
BASOPHILS NFR BLD AUTO: 1.5 %
DIFFERENTIAL METHOD BLD: ABNORMAL
EOSINOPHIL # BLD AUTO: 0.3 10E9/L (ref 0–0.7)
EOSINOPHIL NFR BLD AUTO: 5.3 %
ERYTHROCYTE [DISTWIDTH] IN BLOOD BY AUTOMATED COUNT: 14.3 % (ref 10–15)
HCT VFR BLD AUTO: 44.3 % (ref 35–47)
HGB BLD-MCNC: 15.3 G/DL (ref 11.7–15.7)
IMM GRANULOCYTES # BLD: 0 10E9/L (ref 0–0.4)
IMM GRANULOCYTES NFR BLD: 0.8 %
LYMPHOCYTES # BLD AUTO: 0.6 10E9/L (ref 0.8–5.3)
LYMPHOCYTES NFR BLD AUTO: 12.5 %
MCH RBC QN AUTO: 30.1 PG (ref 26.5–33)
MCHC RBC AUTO-ENTMCNC: 34.5 G/DL (ref 31.5–36.5)
MCV RBC AUTO: 87 FL (ref 78–100)
MONOCYTES # BLD AUTO: 0.4 10E9/L (ref 0–1.3)
MONOCYTES NFR BLD AUTO: 9.3 %
NEUTROPHILS # BLD AUTO: 3.3 10E9/L (ref 1.6–8.3)
NEUTROPHILS NFR BLD AUTO: 70.6 %
PLATELET # BLD AUTO: 295 10E9/L (ref 150–450)
RBC # BLD AUTO: 5.08 10E12/L (ref 3.8–5.2)
WBC # BLD AUTO: 4.7 10E9/L (ref 4–11)

## 2019-11-19 PROCEDURE — 99207 ZZC NO CHARGE LOS: CPT

## 2019-11-19 PROCEDURE — S0028 INJECTION, FAMOTIDINE, 20 MG: HCPCS | Performed by: INTERNAL MEDICINE

## 2019-11-19 PROCEDURE — 96375 TX/PRO/DX INJ NEW DRUG ADDON: CPT | Performed by: INTERNAL MEDICINE

## 2019-11-19 PROCEDURE — 96367 TX/PROPH/DG ADDL SEQ IV INF: CPT | Performed by: INTERNAL MEDICINE

## 2019-11-19 PROCEDURE — 99207 ZZC NO CHARGE NURSE ONLY: CPT

## 2019-11-19 PROCEDURE — 85025 COMPLETE CBC W/AUTO DIFF WBC: CPT | Performed by: INTERNAL MEDICINE

## 2019-11-19 PROCEDURE — 96413 CHEMO IV INFUSION 1 HR: CPT | Performed by: INTERNAL MEDICINE

## 2019-11-19 RX ORDER — HEPARIN SODIUM (PORCINE) LOCK FLUSH IV SOLN 100 UNIT/ML 100 UNIT/ML
5 SOLUTION INTRAVENOUS
Status: DISCONTINUED | OUTPATIENT
Start: 2019-11-19 | End: 2019-11-19 | Stop reason: HOSPADM

## 2019-11-19 RX ORDER — DIPHENHYDRAMINE HCL 25 MG
50 CAPSULE ORAL ONCE
Status: COMPLETED | OUTPATIENT
Start: 2019-11-19 | End: 2019-11-19

## 2019-11-19 RX ADMIN — HEPARIN SODIUM (PORCINE) LOCK FLUSH IV SOLN 100 UNIT/ML 5 ML: 100 SOLUTION at 16:40

## 2019-11-19 RX ADMIN — Medication 50 MG: at 15:01

## 2019-11-19 RX ADMIN — HEPARIN SODIUM (PORCINE) LOCK FLUSH IV SOLN 100 UNIT/ML 5 ML: 100 SOLUTION at 14:17

## 2019-11-19 RX ADMIN — Medication 250 ML: at 15:06

## 2019-11-19 ASSESSMENT — PAIN SCALES - GENERAL: PAINLEVEL: MILD PAIN (3)

## 2019-11-19 NOTE — PROGRESS NOTES
Infusion Nursing Note:  Shaneka Alvarez presents today for C1D15 Taxol.    Patient seen by provider today: No   present during visit today: Not Applicable.    Note: N/A.    Intravenous Access:  Implanted Port.    Treatment Conditions:  Lab Results   Component Value Date    HGB 15.3 11/19/2019     Lab Results   Component Value Date    WBC 4.7 11/19/2019      Lab Results   Component Value Date    ANEU 3.3 11/19/2019     Lab Results   Component Value Date     11/19/2019          Post Infusion Assessment:  Patient tolerated infusion without incident.  Site patent and intact, free from redness, edema or discomfort.  No evidence of extravasations.  Access discontinued per protocol.       Discharge Plan:   Patient will return 12/4 for next appointment.   Patient discharged in stable condition accompanied by: self.  Departure Mode: Ambulatory.    Cristina Loaiza RN

## 2019-11-25 DIAGNOSIS — G47.00 INSOMNIA, UNSPECIFIED TYPE: ICD-10-CM

## 2019-11-26 RX ORDER — TRAZODONE HYDROCHLORIDE 50 MG/1
50 TABLET, FILM COATED ORAL AT BEDTIME
Qty: 30 TABLET | Refills: 0 | Status: SHIPPED | OUTPATIENT
Start: 2019-11-26 | End: 2019-12-10
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
## 2019-12-04 ENCOUNTER — INFUSION THERAPY VISIT (OUTPATIENT)
Dept: INFUSION THERAPY | Facility: CLINIC | Age: 59
End: 2019-12-04
Payer: COMMERCIAL

## 2019-12-04 ENCOUNTER — ONCOLOGY VISIT (OUTPATIENT)
Dept: ONCOLOGY | Facility: CLINIC | Age: 59
End: 2019-12-04
Payer: COMMERCIAL

## 2019-12-04 VITALS
WEIGHT: 143.5 LBS | SYSTOLIC BLOOD PRESSURE: 123 MMHG | TEMPERATURE: 98.3 F | OXYGEN SATURATION: 94 % | DIASTOLIC BLOOD PRESSURE: 85 MMHG | HEART RATE: 86 BPM | BODY MASS INDEX: 26.25 KG/M2

## 2019-12-04 VITALS
HEART RATE: 82 BPM | SYSTOLIC BLOOD PRESSURE: 99 MMHG | RESPIRATION RATE: 16 BRPM | OXYGEN SATURATION: 95 % | DIASTOLIC BLOOD PRESSURE: 72 MMHG | TEMPERATURE: 97.5 F

## 2019-12-04 DIAGNOSIS — C79.51 CARCINOMA OF BREAST METASTATIC TO BONE, UNSPECIFIED LATERALITY (H): ICD-10-CM

## 2019-12-04 DIAGNOSIS — C50.911 BILATERAL MALIGNANT NEOPLASM OF BREAST IN FEMALE, UNSPECIFIED ESTROGEN RECEPTOR STATUS, UNSPECIFIED SITE OF BREAST (H): ICD-10-CM

## 2019-12-04 DIAGNOSIS — C50.912 BILATERAL MALIGNANT NEOPLASM OF BREAST IN FEMALE, UNSPECIFIED ESTROGEN RECEPTOR STATUS, UNSPECIFIED SITE OF BREAST (H): ICD-10-CM

## 2019-12-04 DIAGNOSIS — C50.919 METASTATIC BREAST CANCER: ICD-10-CM

## 2019-12-04 DIAGNOSIS — C50.919 METASTATIC BREAST CANCER: Primary | ICD-10-CM

## 2019-12-04 DIAGNOSIS — C50.919 CARCINOMA OF BREAST METASTATIC TO BONE, UNSPECIFIED LATERALITY (H): ICD-10-CM

## 2019-12-04 DIAGNOSIS — G89.3 CANCER ASSOCIATED PAIN: Primary | ICD-10-CM

## 2019-12-04 LAB
ALBUMIN SERPL-MCNC: 3.5 G/DL (ref 3.4–5)
ALP SERPL-CCNC: 96 U/L (ref 40–150)
ALT SERPL W P-5'-P-CCNC: 34 U/L (ref 0–50)
ANION GAP SERPL CALCULATED.3IONS-SCNC: 5 MMOL/L (ref 3–14)
AST SERPL W P-5'-P-CCNC: 22 U/L (ref 0–45)
BASOPHILS # BLD AUTO: 0 10E9/L (ref 0–0.2)
BASOPHILS NFR BLD AUTO: 1 %
BILIRUB SERPL-MCNC: 0.4 MG/DL (ref 0.2–1.3)
BUN SERPL-MCNC: 6 MG/DL (ref 7–30)
CALCIUM SERPL-MCNC: 8.9 MG/DL (ref 8.5–10.1)
CANCER AG27-29 SERPL-ACNC: 153 U/ML (ref 0–39)
CHLORIDE SERPL-SCNC: 102 MMOL/L (ref 94–109)
CO2 SERPL-SCNC: 28 MMOL/L (ref 20–32)
CREAT SERPL-MCNC: 0.63 MG/DL (ref 0.52–1.04)
DIFFERENTIAL METHOD BLD: ABNORMAL
EOSINOPHIL # BLD AUTO: 0.1 10E9/L (ref 0–0.7)
EOSINOPHIL NFR BLD AUTO: 2 %
ERYTHROCYTE [DISTWIDTH] IN BLOOD BY AUTOMATED COUNT: 14.6 % (ref 10–15)
GFR SERPL CREATININE-BSD FRML MDRD: >90 ML/MIN/{1.73_M2}
GLUCOSE SERPL-MCNC: 99 MG/DL (ref 70–99)
HCT VFR BLD AUTO: 39.5 % (ref 35–47)
HGB BLD-MCNC: 13.1 G/DL (ref 11.7–15.7)
LYMPHOCYTES # BLD AUTO: 0.6 10E9/L (ref 0.8–5.3)
LYMPHOCYTES NFR BLD AUTO: 20 %
MCH RBC QN AUTO: 29.8 PG (ref 26.5–33)
MCHC RBC AUTO-ENTMCNC: 33.2 G/DL (ref 31.5–36.5)
MCV RBC AUTO: 90 FL (ref 78–100)
MONOCYTES # BLD AUTO: 0.5 10E9/L (ref 0–1.3)
MONOCYTES NFR BLD AUTO: 15 %
NEUTROPHILS # BLD AUTO: 1.8 10E9/L (ref 1.6–8.3)
NEUTROPHILS NFR BLD AUTO: 62 %
PLATELET # BLD AUTO: 198 10E9/L (ref 150–450)
PLATELET # BLD EST: ABNORMAL 10*3/UL
POTASSIUM SERPL-SCNC: 3.6 MMOL/L (ref 3.4–5.3)
PROT SERPL-MCNC: 6.6 G/DL (ref 6.8–8.8)
RBC # BLD AUTO: 4.4 10E12/L (ref 3.8–5.2)
RBC MORPH BLD: NORMAL
SODIUM SERPL-SCNC: 135 MMOL/L (ref 133–144)
WBC # BLD AUTO: 3 10E9/L (ref 4–11)

## 2019-12-04 PROCEDURE — 86300 IMMUNOASSAY TUMOR CA 15-3: CPT | Performed by: INTERNAL MEDICINE

## 2019-12-04 PROCEDURE — 99204 OFFICE O/P NEW MOD 45 MIN: CPT | Performed by: HOSPITALIST

## 2019-12-04 PROCEDURE — 96413 CHEMO IV INFUSION 1 HR: CPT | Performed by: INTERNAL MEDICINE

## 2019-12-04 PROCEDURE — 85025 COMPLETE CBC W/AUTO DIFF WBC: CPT | Performed by: INTERNAL MEDICINE

## 2019-12-04 PROCEDURE — 99207 ZZC NO CHARGE NURSE ONLY: CPT

## 2019-12-04 PROCEDURE — S0028 INJECTION, FAMOTIDINE, 20 MG: HCPCS | Performed by: INTERNAL MEDICINE

## 2019-12-04 PROCEDURE — 99207 ZZC NO CHARGE LOS: CPT

## 2019-12-04 PROCEDURE — 96375 TX/PRO/DX INJ NEW DRUG ADDON: CPT | Performed by: INTERNAL MEDICINE

## 2019-12-04 PROCEDURE — 96367 TX/PROPH/DG ADDL SEQ IV INF: CPT | Performed by: INTERNAL MEDICINE

## 2019-12-04 PROCEDURE — 99215 OFFICE O/P EST HI 40 MIN: CPT | Mod: 25 | Performed by: INTERNAL MEDICINE

## 2019-12-04 PROCEDURE — 80053 COMPREHEN METABOLIC PANEL: CPT | Performed by: INTERNAL MEDICINE

## 2019-12-04 RX ORDER — HEPARIN SODIUM (PORCINE) LOCK FLUSH IV SOLN 100 UNIT/ML 100 UNIT/ML
5 SOLUTION INTRAVENOUS
Status: DISCONTINUED | OUTPATIENT
Start: 2019-12-04 | End: 2019-12-04 | Stop reason: HOSPADM

## 2019-12-04 RX ORDER — NALOXONE HYDROCHLORIDE 0.4 MG/ML
.1-.4 INJECTION, SOLUTION INTRAMUSCULAR; INTRAVENOUS; SUBCUTANEOUS
Status: CANCELLED | OUTPATIENT
Start: 2019-12-04

## 2019-12-04 RX ORDER — DIPHENHYDRAMINE HCL 25 MG
50 CAPSULE ORAL ONCE
Status: CANCELLED
Start: 2019-12-04

## 2019-12-04 RX ORDER — SODIUM CHLORIDE 9 MG/ML
1000 INJECTION, SOLUTION INTRAVENOUS CONTINUOUS PRN
Status: CANCELLED
Start: 2019-12-04

## 2019-12-04 RX ORDER — LORAZEPAM 2 MG/ML
0.5 INJECTION INTRAMUSCULAR EVERY 4 HOURS PRN
Status: CANCELLED
Start: 2019-12-04

## 2019-12-04 RX ORDER — EPINEPHRINE 1 MG/ML
0.3 INJECTION, SOLUTION INTRAMUSCULAR; SUBCUTANEOUS EVERY 5 MIN PRN
Status: CANCELLED | OUTPATIENT
Start: 2019-12-04

## 2019-12-04 RX ORDER — MEPERIDINE HYDROCHLORIDE 25 MG/ML
25 INJECTION INTRAMUSCULAR; INTRAVENOUS; SUBCUTANEOUS EVERY 30 MIN PRN
Status: CANCELLED | OUTPATIENT
Start: 2019-12-04

## 2019-12-04 RX ORDER — DIPHENHYDRAMINE HCL 25 MG
50 CAPSULE ORAL ONCE
Status: COMPLETED | OUTPATIENT
Start: 2019-12-04 | End: 2019-12-04

## 2019-12-04 RX ORDER — METHYLPREDNISOLONE SODIUM SUCCINATE 125 MG/2ML
125 INJECTION, POWDER, LYOPHILIZED, FOR SOLUTION INTRAMUSCULAR; INTRAVENOUS
Status: CANCELLED
Start: 2019-12-04

## 2019-12-04 RX ORDER — ALBUTEROL SULFATE 90 UG/1
1-2 AEROSOL, METERED RESPIRATORY (INHALATION)
Status: CANCELLED
Start: 2019-12-04

## 2019-12-04 RX ORDER — DIPHENHYDRAMINE HYDROCHLORIDE 50 MG/ML
50 INJECTION INTRAMUSCULAR; INTRAVENOUS
Status: CANCELLED
Start: 2019-12-04

## 2019-12-04 RX ORDER — OXYCODONE HYDROCHLORIDE 5 MG/1
5-10 TABLET ORAL EVERY 4 HOURS PRN
Qty: 100 TABLET | Refills: 0 | Status: SHIPPED | OUTPATIENT
Start: 2019-12-04 | End: 2019-12-30

## 2019-12-04 RX ORDER — ALBUTEROL SULFATE 0.83 MG/ML
2.5 SOLUTION RESPIRATORY (INHALATION)
Status: CANCELLED | OUTPATIENT
Start: 2019-12-04

## 2019-12-04 RX ORDER — EPINEPHRINE 0.3 MG/.3ML
0.3 INJECTION SUBCUTANEOUS EVERY 5 MIN PRN
Status: CANCELLED | OUTPATIENT
Start: 2019-12-04

## 2019-12-04 RX ADMIN — HEPARIN SODIUM (PORCINE) LOCK FLUSH IV SOLN 100 UNIT/ML 5 ML: 100 SOLUTION at 13:20

## 2019-12-04 RX ADMIN — Medication 50 MG: at 11:12

## 2019-12-04 RX ADMIN — HEPARIN SODIUM (PORCINE) LOCK FLUSH IV SOLN 100 UNIT/ML 5 ML: 100 SOLUTION at 09:32

## 2019-12-04 RX ADMIN — Medication 250 ML: at 11:11

## 2019-12-04 ASSESSMENT — PAIN SCALES - GENERAL: PAINLEVEL: MODERATE PAIN (4)

## 2019-12-04 NOTE — PATIENT INSTRUCTIONS
Patient Instructions      Expand All Collapse All    Thank you for coming into the Palliative Care Clinic today.     1. Please take tylenol 1000mg (2 extra strengths) three times a day     2. I referred you to physical therapy    3. Please see PAMELA Freire the palliative  for support and also for mind-body techniques to support symptom management    4. I gave you a referral to nutrition    Call if your symptoms change and the medications we have prescribed are not working. Do not take your medications at any other dose or frequently than how they are prescribed.     Call us at least a week in advance if you need a medication refill    Please bring all your pill bottles to your next appointment.     Failure to follow these instructions may result in the palliative care team not being able to prescribe your medications.    Return to clinic in 1-2 months for a follow up.     You can reach the Palliative Care Team during business hours at the following number:    - [phone]    To reach the Palliative Care Provider on-call After-hours or on holidays and weekends, call: [phone].  Please note that we are not able to provide pain medication refills on evenings or weekends.

## 2019-12-04 NOTE — LETTER
12/4/2019         RE: Shaneka Alvarez  [address] [facility] [address]        Dear Colleague,    Thank you for referring your patient, Shaneka Alvarez, to the [facility]. Please see a copy of my visit note below.    ONCOLOGY FOLLOW UP NOTE: 12/4/2019        I took the history from reviewing the previous notes that she was following with Dr. Amaya.  I have copied and updated from prior notes.    ONCOLOGY History:    1.  January 2006:  Diagnosed with Stage IIB, T2 N1 M0 invasive lobular carcinoma of the right breast.  Final pathology showed a 4.5 x 3.8 x 2.5 cm, 01/14 lymph nodes positive.  Estrogen, progesterone receptor positive, HER-2 negative.     2.  Genetics.  BRCA1 and 2 mutations not detected. 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       THERAPY TO DATE:   1.  2006:  ECOG 2104 protocol of dose-dense Adriamycin, Cytoxan and Avastin x4 cycles followed by Taxol and Avastin x4 cycles.   2.  03/2007:  Completed 1 year Avastin.   3.  11/2006-01/2007:  Radiotherapy to the right breast of 5040 cGy.   4.  03/[id number]:  Aromasin.  Stopped after moving to the [facility].   5.  01/2016:  Right modified radical mastectomy with latissimus dorsi flap reconstruction and a left prophylactic mastectomy with latissimus dorsi flap reconstruction.     She recently had MRI of the brain as a follow-up for multiple sclerosis and there were multiple calvarial lesions noted which was suspicious for metastatic disease.  There was no evidence of new multiple sclerosis lesions.  She had a PET scan on 8/30/2019 which showed widespread bone metastatic lesions (calvarium, spine, sacrum, pelvis, ribs most prominent at C7, T3, L1 and L4), hypermetabolic 3 cm lesion in LLL, and mediastinal/hilar LN. CEA wnl at 1.9 and C27-29 elevated to 415 (28 on 8/23/16). A CT guided biopsy of the right iliac bone was obtained on 9/4/19, pathology consistent with metastatic adenocarcinoma (breast), ER/MS negative, HER-2  negative PDL 1 < 1%. A second biopsy was take of the LLL nodule via EBUS on 9/5/19 did not show any malignancy.    C/T/L spine MRI 8/3/19 to 9/2/19 with numerous enhancing lesions of the C, T and L spine, largest around T9 and L1. No evidence of cord compression. Has a L1 compression fracture and impending L4.     Received her radiation T12-L5 3000 cGy in 10 fractions from 9/6/2019 till 9/18/2019.  Neurosurgery recommended no surgical intervention but to wear [place] brace when out of bed and HOB >30 degrees    She started palliative Xeloda 2000/1500 on 10/1/2019 but after just a few doses she noticed nausea, red eyes blurred vision, loss of appetite.  She stopped taking it after 5 doses and was seen by nurse practitioner on 10/7/2019 when she was improving.  At that point she was restarted on Xeloda at a lower dose of 1000 mg twice a day.  As she was not able to tolerate even the lower dose with extreme nausea and vomiting and feeling extremely fatigued and blurry vision, we decided on stopping Xeloda.    We decided on repeating scans and MRI brain on 10/25/2019 showed no intracranial metastatic disease.   Multiple enhancing calvarial lesions may be increased in number and are suggestive of metastatic disease. Thinner imaging on the current study may identify the smaller lesions and may be responsible for  identified more lesions.   There is a single focus of T2 hyperintense signal within the anterior right temporal lobe may represent interval demyelination. There is no evidence for active demyelination.    PET/CT on 11/1/2019 showed favorable response to therapy and Overall FDG uptake within scattered osseous metastases is decreased since 8/30/2019, particularly within the pelvis. Increased sclerotic appearance of L1 and L4 pathologic compression deformities, likely sequela of recently completed palliative radiation therapy.    No significant FDG uptake in previously demonstrated hypermetabolic mediastinal lymph  nodes. Biopsy negative on 9/5/2019.  There is also decreased FDG uptake in the left lower lobe (biopsy negative for  malignancy), now with dense consolidation containing air bronchograms suggestive of infection versus aspiration.  There is diffuse FDG uptake within the esophagus, consistent with esophagitis.    Because of intolerance to Xeloda we decided on switching to weekly paclitaxel on 11/5/2019.      I had also recommended starting Zometa so she got dental clearance to start with that.  She received Zometa on 10/23/2019.  We plan to give it every 3 months.    She comes in today and tells me that this chemotherapy is going better than when she was on Xeloda.  She still gets nausea and vomiting in previously she was taking Zofran Compazine and Ativan but she was also getting more migraines and headaches so Zofran was stopped.  She continues to take Compazine and Ativan and she mentions that overall headaches are better.  Vision is not blurry anymore.  She feels fatigued for most of the days.  Few days ago she went to grocery shopping and had to sit down a couple of times because she felt exhausted.  Denies any significant diarrhea or constipation and she is using Senokot which is working well.  She denies any fevers or infections.  No new swellings.  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  She has chronic heat sensitivity and weakness in the legs and balance issues and some forgetfulness from multiple sclerosis.  Her back pain is better with oxycodone and she takes 2 tablets in the morning and usually 2 at night.  She was evaluated by orthopedics and now she is not wearing the brace.  She is taking calcium and vitamin D.    ECOG 2    ROS:  A comprehensive ROS was otherwise neg      I reviewed other history in epic as below.       PAST MEDICAL HISTORY:     1.  Breast cancer  2.  Multiple sclerosis.   3.  Depression.   4.  Migraines.   5.  " Hypertension.   6.  Cholecystectomy and umbilical hernia repair.     SOCIAL HISTORY: She smoked for 5 years but quit many years ago.  Drinks alcohol socially.  She lives with her .  She is a teacher in middle school.       FAMILY HISTORY: She mentions that her mother had breast cancer at 49.  Both grandmothers had breast cancer but she is not sure of the age.  A couple of cousins have cancers.  Patient has 3 children who are healthy.    Current Outpatient Medications   Medication     acetaminophen (TYLENOL) 500 MG tablet     busPIRone (BUSPAR) 15 MG tablet     calcium citrate-vitamin D (CITRACAL) 315-250 MG-UNIT TABS     Cholecalciferol (VITAMIN D3 PO)     diclofenac (VOLTAREN) 1 % topical gel     Lidocaine (LIDOCARE) 4 % Patch     LORazepam (ATIVAN) 0.5 MG tablet     magnesium 250 MG tablet     oxyCODONE (ROXICODONE) 5 MG tablet     polyethylene glycol (MIRALAX/GLYCOLAX) packet     prochlorperazine (COMPAZINE) 10 MG tablet     propranolol (INDERAL LA) 60 MG 24 hr capsule     senna-docusate (SENOKOT-S/PERICOLACE) 8.6-50 MG tablet     tolterodine ER (DETROL LA) 4 MG 24 hr capsule     traZODone (DESYREL) 50 MG tablet     venlafaxine (EFFEXOR-ER) 225 MG TB24 24 hr tablet     erenumab-aooe (AIMOVIG 140 DOSE) 70 MG/ML injection     ondansetron (ZOFRAN-ODT) 8 MG ODT tab     ranitidine (ZANTAC) 150 MG tablet     syringe/needle, disp, (B-D INTEGRA SYRINGE) 23G X 1\" 3 ML MISC     No current facility-administered medications for this visit.           Allergies   Allergen Reactions     Bactrim [Sulfamethoxazole W/Trimethoprim]      Internal bleeding     Copaxone [Glatiramer] Hives          PHYSICAL EXAMINATION:   There were no vitals taken for this visit.  Wt Readings from Last 4 Encounters:   12/04/19 65.1 kg (143 lb 8 oz)   11/19/19 63.5 kg (140 lb)   11/12/19 65.3 kg (144 lb)   11/05/19 67.3 kg (148 lb 6.4 oz)     CONSTITUTIONAL: no acute distress  EYES: PERRLA, no palor or icterus.   ENT/MOUTH: no mouth lesions. " Ears normal  CVS: s1s2 no m r g .   RESPIRATORY: clear to auscultation b/l  GI: soft non tender no hepatosplenomegaly  NEURO: AAOX3  Grossly non focal neuro exam  INTEGUMENT: no obvious rashes  LYMPHATIC: no palpable cervical, supraclavicular, axillary or inguinal LAD  MUSCULOSKELETAL: Unremarkable. No bony tenderness.   EXTREMITIES: no edema  PSYCH: Mentation, mood and affect are normal. Decision making capacity is intact        Labs and imaging reviewed.  Results for KYLE GU (MRN [id number]) as of 12/4/2019 10:52   Ref. Range 12/4/2019 09:20   Sodium Latest Ref Range: 133 - 144 mmol/L 135   Potassium Latest Ref Range: 3.4 - 5.3 mmol/L 3.6   Chloride Latest Ref Range: 94 - 109 mmol/L 102   Carbon Dioxide Latest Ref Range: 20 - 32 mmol/L 28   Urea Nitrogen Latest Ref Range: 7 - 30 mg/dL 6 (L)   Creatinine Latest Ref Range: 0.52 - 1.04 mg/dL 0.63   GFR Estimate Latest Ref Range: >60 mL/min/1.73_m2 >90   GFR Estimate If Black Latest Ref Range: >60 mL/min/1.73_m2 >90   Calcium Latest Ref Range: 8.5 - 10.1 mg/dL 8.9   Anion Gap Latest Ref Range: 3 - 14 mmol/L 5   Albumin Latest Ref Range: 3.4 - 5.0 g/dL 3.5   Protein Total Latest Ref Range: 6.8 - 8.8 g/dL 6.6 (L)   Bilirubin Total Latest Ref Range: 0.2 - 1.3 mg/dL 0.4   Alkaline Phosphatase Latest Ref Range: 40 - 150 U/L 96   ALT Latest Ref Range: 0 - 50 U/L 34   AST Latest Ref Range: 0 - 45 U/L 22   Glucose Latest Ref Range: 70 - 99 mg/dL 99   WBC Latest Ref Range: 4.0 - 11.0 10e9/L 3.0 (L)   Hemoglobin Latest Ref Range: 11.7 - 15.7 g/dL 13.1   Hematocrit Latest Ref Range: 35.0 - 47.0 % 39.5   Platelet Count Latest Ref Range: 150 - 450 10e9/L 198   RBC Count Latest Ref Range: 3.8 - 5.2 10e12/L 4.40   MCV Latest Ref Range: 78 - 100 fl 90   MCH Latest Ref Range: 26.5 - 33.0 pg 29.8   MCHC Latest Ref Range: 31.5 - 36.5 g/dL 33.2   RDW Latest Ref Range: 10.0 - 15.0 % 14.6   Diff Method Unknown Manual Differential   % Neutrophils Latest Units: % 62.0   % Lymphocytes  Latest Units: % 20.0   % Monocytes Latest Units: % 15.0   % Eosinophils Latest Units: % 2.0   % Basophils Latest Units: % 1.0   Absolute Neutrophil Latest Ref Range: 1.6 - 8.3 10e9/L 1.8   Absolute Lymphocytes Latest Ref Range: 0.8 - 5.3 10e9/L 0.6 (L)   Absolute Monocytes Latest Ref Range: 0.0 - 1.3 10e9/L 0.5   Absolute Eosinophils Latest Ref Range: 0.0 - 0.7 10e9/L 0.1        ASSESSMENT AND PLAN:   1.  History of stage IIB, T2 N1 M0 invasive lobular carcinoma of the right breast.  It was initially diagnosed in 2006 and at that time it was hormone receptor positive, HER-2 negative.  She was treated on ECOG 2104 protocol with dose-dense Adriamycin, Cytoxan and Avastin x4 cycles followed by Taxol and Avastin x4 cycles followed by a Avastin for 1 year.  She also received radiation to the right breast which she completed in January 2007 (5040 cGy).  She took Aromasin from 2007 to 2014.    Now she has metastatic breast cancer with extensive involvement of the bones.  There is also a left lower lobe hypermetabolic lesion and mediastinal lymph nodes which are hypermetabolic.  The biopsy from the right iliac bone is consistent with metastatic lobular breast cancer but it is hormone receptor and HER-2 negative and PDL 1 is also negative.    She has received 3000 cGy of palliative radiation to T12-L5 from 9/6/2019 till 9/18/2019.    She was started on palliative Xeloda but she was unable to tolerate even the dose reduced medication.        We decided on repeating scans and MRI brain on 10/25/2019 showed no intracranial metastatic disease.   Multiple enhancing calvarial lesions may be increased in number and are suggestive of metastatic disease. Thinner imaging on the current study may identify the smaller lesions and may be responsible for  identified more lesions.   There is a single focus of T2 hyperintense signal within the anterior right temporal lobe may represent interval demyelination. There is no evidence for active  demyelination.    PET/CT on 11/1/2019 showed favorable response to therapy and Overall FDG uptake within scattered osseous metastases is decreased since 8/30/2019, particularly within the pelvis. Increased sclerotic appearance of L1 and L4 pathologic compression deformities, likely sequela of recently completed palliative radiation therapy.    No significant FDG uptake in previously demonstrated hypermetabolic mediastinal lymph nodes. Biopsy negative on 9/5/2019.  There is also decreased FDG uptake in the left lower lobe (biopsy negative for  malignancy), now with dense consolidation containing air bronchograms suggestive of infection versus aspiration.  There is diffuse FDG uptake within the esophagus, consistent with esophagitis.    Because of intolerance to Xeloda we decided on switching to weekly paclitaxel on 11/5/2019.    Overall the chemotherapy is going fairly but she has some side effects which need to be addressed.  We will start cycle #2 today but I plan to decrease the dose of chemotherapy from 80 mg/m  to 70 mg/m  to help her tolerate it better.  If she continues to do well then we will repeat his scans after 3 cycles of chemotherapy.    Nausea and vomiting.  She had significant nausea and vomiting and I will add Emend to her premedication.  She will continue to use Compazine and Ativan.  She was also evaluated by palliative care and plan is to enroll her in medical marijuana program.      Pain management.  She has widespread metastatic breast cancer in the bones.  Her main pain is in the mid back and rib cage area.  She is taking 2 oxycodone twice a day which is controlling the pain.  Previously she got palliative radiation to T12-L5 3000 cGy in 10 fractions from 9/6/2019 till 9/18/2019.  She was evaluated by orthopedics Dr. Bipin Elliott on 11/1/2019 and conservative management was recommended.  She saw palliative care today.        Bone metastasis.  She got dental clearance to start Zometa and  we give it to her on 10/23/2019 and she will get it every 3 months.  She will continue to take vitamin D and calcium.       Fatigue.  Due to cancer and chemotherapy.  I recommend follow-up in cancer rehab program.      Insomnia.  She is sleeping better on trazodone 50 mg at night and she will continue to do that.        We did not address the following today.  Discussion regarding healthcare directives.  On previous visit she told me that she will bring the health care directive for our records but she forgot so I told her to bring it on next visit.      Breast implant removal.  She tells me that she was planning to do breast implant removal because there was a recall on this.  Her surgery was scheduled for 9/24/2019.  Because of this new development of metastatic breast cancer and her recent radiation, surgery has been canceled.  We discussed that at this time treatment for metastatic breast cancer would take precedence over the other surgery as the other surgery would require her to be off chemotherapy for several weeks and in that case the chances of cancer progression would be high and I would not recommend that.    Multiple sclerosis.  She will continue to follow with her neurologist Dr. Quiles.  Currently multiple sclerosis is under control and currently she is not taking any medications.    Return to clinic in 4 weeks before the start of cycle #3.  She tells me that she might go to [place] for a few days around the end of December and if she plans to do that then we will plan cycle #3 accordingly.  She might have a few days delay in getting cycle #3.    All questions answered.  She is agreeable and comfortable with the plan.    Dewayne Zepeda MD      Oncology Rooming Note    December 4, 2019 10:27 AM   Shaneka Alvarez is a 59 year old female who presents for:    Chief Complaint   Patient presents with     Oncology Clinic Visit     Return appt     Initial Vitals: There were no vitals taken for this visit.  "Estimated body mass index is 26.25 kg/m  as calculated from the following:    Height as of 11/1/19: 1.575 m (5' 2\").    Weight as of an earlier encounter on 12/4/19: 65.1 kg (143 lb 8 oz). There is no height or weight on file to calculate BSA.  Data Unavailable Comment: Data Unavailable   No LMP recorded. Patient is postmenopausal.  Allergies reviewed: Yes  Medications reviewed: Yes    Medications: Medication refills not needed today.  Pharmacy name entered into [facility]:    [id number] - [facility] [address] AT [id number] [address] [address] [facility] [address]  [facility] - [place], [place] - [address]    Clinical concerns: how often will you do scans to see progress, r side of face mole Dr. Zepeda was notified.      Javier Roland RN                Again, thank you for allowing me to participate in the care of your patient.        Sincerely,        Dewayne Zepeda MD    "

## 2019-12-04 NOTE — PROGRESS NOTES
"Oncology Rooming Note    December 4, 2019 10:27 AM   Shaneka Alvarez is a 59 year old female who presents for:    Chief Complaint   Patient presents with     Oncology Clinic Visit     Return appt     Initial Vitals: There were no vitals taken for this visit. Estimated body mass index is 26.25 kg/m  as calculated from the following:    Height as of 11/1/19: 1.575 m (5' 2\").    Weight as of an earlier encounter on 12/4/19: 65.1 kg (143 lb 8 oz). There is no height or weight on file to calculate BSA.  Data Unavailable Comment: Data Unavailable   No LMP recorded. Patient is postmenopausal.  Allergies reviewed: Yes  Medications reviewed: Yes    Medications: Medication refills not needed today.  Pharmacy name entered into [facility]:    [id number] - [place], [place] - [address] AT [id number] [place] - [address] [address] AT [facility] [address]  [facility] - [place], [place] - [address]    Clinical concerns: how often will you do scans to see progress, r side of face mole Dr. Zepeda was notified.      Javier Roland RN              "
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
## 2019-12-04 NOTE — PROGRESS NOTES
ONCOLOGY FOLLOW UP NOTE: 12/4/2019        I took the history from reviewing the previous notes that she was following with Dr. Amaya.  I have copied and updated from prior notes.    ONCOLOGY History:    1.  January 2006:  Diagnosed with Stage IIB, T2 N1 M0 invasive lobular carcinoma of the right breast.  Final pathology showed a 4.5 x 3.8 x 2.5 cm, 01/14 lymph nodes positive.  Estrogen, progesterone receptor positive, HER-2 negative.     2.  Genetics.  BRCA1 and 2 mutations not detected. 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       THERAPY TO DATE:   1.  2006:  ECOG 2104 protocol of dose-dense Adriamycin, Cytoxan and Avastin x4 cycles followed by Taxol and Avastin x4 cycles.   2.  03/2007:  Completed 1 year Avastin.   3.  11/2006-01/2007:  Radiotherapy to the right breast of 5040 cGy.   4.  03/20072824-9373:  Aromasin.  Stopped after moving to the [facility].   5.  01/2016:  Right modified radical mastectomy with latissimus dorsi flap reconstruction and a left prophylactic mastectomy with latissimus dorsi flap reconstruction.     She recently had MRI of the brain as a follow-up for multiple sclerosis and there were multiple calvarial lesions noted which was suspicious for metastatic disease.  There was no evidence of new multiple sclerosis lesions.  She had a PET scan on 8/30/2019 which showed widespread bone metastatic lesions (calvarium, spine, sacrum, pelvis, ribs most prominent at C7, T3, L1 and L4), hypermetabolic 3 cm lesion in LLL, and mediastinal/hilar LN. CEA wnl at 1.9 and C27-29 elevated to 415 (28 on 8/23/16). A CT guided biopsy of the right iliac bone was obtained on 9/4/19, pathology consistent with metastatic adenocarcinoma (breast), ER/AL negative, HER-2 negative PDL 1 < 1%. A second biopsy was take of the LLL nodule via EBUS on 9/5/19 did not show any malignancy.    C/T/L spine MRI 8/3/19 to 9/2/19 with numerous enhancing lesions of the C, T and L spine, largest around T9 and L1. No evidence  of cord compression. Has a L1 compression fracture and impending L4.     Received her radiation T12-L5 3000 cGy in 10 fractions from 9/6/2019 till 9/18/2019.  Neurosurgery recommended no surgical intervention but to wear Rowlett brace when out of bed and HOB >30 degrees    She started palliative Xeloda 2000/1500 on 10/1/2019 but after just a few doses she noticed nausea, red eyes blurred vision, loss of appetite.  She stopped taking it after 5 doses and was seen by nurse practitioner on 10/7/2019 when she was improving.  At that point she was restarted on Xeloda at a lower dose of 1000 mg twice a day.  As she was not able to tolerate even the lower dose with extreme nausea and vomiting and feeling extremely fatigued and blurry vision, we decided on stopping Xeloda.    We decided on repeating scans and MRI brain on 10/25/2019 showed no intracranial metastatic disease.   Multiple enhancing calvarial lesions may be increased in number and are suggestive of metastatic disease. Thinner imaging on the current study may identify the smaller lesions and may be responsible for  identified more lesions.   There is a single focus of T2 hyperintense signal within the anterior right temporal lobe may represent interval demyelination. There is no evidence for active demyelination.    PET/CT on 11/1/2019 showed favorable response to therapy and Overall FDG uptake within scattered osseous metastases is decreased since 8/30/2019, particularly within the pelvis. Increased sclerotic appearance of L1 and L4 pathologic compression deformities, likely sequela of recently completed palliative radiation therapy.    No significant FDG uptake in previously demonstrated hypermetabolic mediastinal lymph nodes. Biopsy negative on 9/5/2019.  There is also decreased FDG uptake in the left lower lobe (biopsy negative for  malignancy), now with dense consolidation containing air bronchograms suggestive of infection versus aspiration.  There is  diffuse FDG uptake within the esophagus, consistent with esophagitis.    Because of intolerance to Xeloda we decided on switching to weekly paclitaxel on 11/5/2019.      I had also recommended starting Zometa so she got dental clearance to start with that.  She received Zometa on 10/23/2019.  We plan to give it every 3 months.    She comes in today and tells me that this chemotherapy is going better than when she was on Xeloda.  She still gets nausea and vomiting in previously she was taking Zofran Compazine and Ativan but she was also getting more migraines and headaches so Zofran was stopped.  She continues to take Compazine and Ativan and she mentions that overall headaches are better.  Vision is not blurry anymore.  She feels fatigued for most of the days.  Few days ago she went to grocery shopping and had to sit down a couple of times because she felt exhausted.  Denies any significant diarrhea or constipation and she is using Senokot which is working well.  She denies any fevers or infections.  No new swellings.  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  She has chronic heat sensitivity and weakness in the legs and balance issues and some forgetfulness from multiple sclerosis.  Her back pain is better with oxycodone and she takes 2 tablets in the morning and usually 2 at night.  She was evaluated by orthopedics and now she is not wearing the brace.  She is taking calcium and vitamin D.    ECOG 2    ROS:  A comprehensive ROS was otherwise neg      I reviewed other history in epic as below.       PAST MEDICAL HISTORY:     1.  Breast cancer  2.  Multiple sclerosis.   3.  Depression.   4.  Migraines.   5.  Hypertension.   6.  Cholecystectomy and umbilical hernia repair.     SOCIAL HISTORY: She smoked for 5 years but quit many years ago.  Drinks alcohol socially.  She lives with her .  She is a teacher in middle school.       FAMILY  "HISTORY: She mentions that her mother had breast cancer at 49.  Both grandmothers had breast cancer but she is not sure of the age.  A couple of cousins have cancers.  Patient has 3 children who are healthy.    Current Outpatient Medications   Medication     acetaminophen (TYLENOL) 500 MG tablet     busPIRone (BUSPAR) 15 MG tablet     calcium citrate-vitamin D (CITRACAL) 315-250 MG-UNIT TABS     Cholecalciferol (VITAMIN D3 PO)     diclofenac (VOLTAREN) 1 % topical gel     Lidocaine (LIDOCARE) 4 % Patch     LORazepam (ATIVAN) 0.5 MG tablet     magnesium 250 MG tablet     oxyCODONE (ROXICODONE) 5 MG tablet     polyethylene glycol (MIRALAX/GLYCOLAX) packet     prochlorperazine (COMPAZINE) 10 MG tablet     propranolol (INDERAL LA) 60 MG 24 hr capsule     senna-docusate (SENOKOT-S/PERICOLACE) 8.6-50 MG tablet     tolterodine ER (DETROL LA) 4 MG 24 hr capsule     traZODone (DESYREL) 50 MG tablet     venlafaxine (EFFEXOR-ER) 225 MG TB24 24 hr tablet     erenumab-aooe (AIMOVIG 140 DOSE) 70 MG/ML injection     ondansetron (ZOFRAN-ODT) 8 MG ODT tab     ranitidine (ZANTAC) 150 MG tablet     syringe/needle, disp, (B-D [employer]RA SYRINGE) 23G X 1\" 3 ML MISC     No current facility-administered medications for this visit.           Allergies   Allergen Reactions     Bactrim [Sulfamethoxazole W/Trimethoprim]      Internal bleeding     Copaxone [Glatiramer] Hives          PHYSICAL EXAMINATION:   There were no vitals taken for this visit.  Wt Readings from Last 4 Encounters:   12/04/19 65.1 kg (143 lb 8 oz)   11/19/19 63.5 kg (140 lb)   11/12/19 65.3 kg (144 lb)   11/05/19 67.3 kg (148 lb 6.4 oz)     CONSTITUTIONAL: no acute distress  EYES: PERRLA, no palor or icterus.   ENT/MOUTH: no mouth lesions. Ears normal  CVS: s1s2 no m r g .   RESPIRATORY: clear to auscultation b/l  GI: soft non tender no hepatosplenomegaly  NEURO: AAOX3  Grossly non focal neuro exam  INTEGUMENT: no obvious rashes  LYMPHATIC: no palpable cervical, " supraclavicular, axillary or inguinal LAD  MUSCULOSKELETAL: Unremarkable. No bony tenderness.   EXTREMITIES: no edema  PSYCH: Mentation, mood and affect are normal. Decision making capacity is intact        Labs and imaging reviewed.  Results for KYLE GU (MRN [id number]) as of 12/4/2019 10:52   Ref. Range 12/4/2019 09:20   Sodium Latest Ref Range: 133 - 144 mmol/L 135   Potassium Latest Ref Range: 3.4 - 5.3 mmol/L 3.6   Chloride Latest Ref Range: 94 - 109 mmol/L 102   Carbon Dioxide Latest Ref Range: 20 - 32 mmol/L 28   Urea Nitrogen Latest Ref Range: 7 - 30 mg/dL 6 (L)   Creatinine Latest Ref Range: 0.52 - 1.04 mg/dL 0.63   GFR Estimate Latest Ref Range: >60 mL/min/1.73_m2 >90   GFR Estimate If Black Latest Ref Range: >60 mL/min/1.73_m2 >90   Calcium Latest Ref Range: 8.5 - 10.1 mg/dL 8.9   Anion Gap Latest Ref Range: 3 - 14 mmol/L 5   Albumin Latest Ref Range: 3.4 - 5.0 g/dL 3.5   Protein Total Latest Ref Range: 6.8 - 8.8 g/dL 6.6 (L)   Bilirubin Total Latest Ref Range: 0.2 - 1.3 mg/dL 0.4   Alkaline Phosphatase Latest Ref Range: 40 - 150 U/L 96   ALT Latest Ref Range: 0 - 50 U/L 34   AST Latest Ref Range: 0 - 45 U/L 22   Glucose Latest Ref Range: 70 - 99 mg/dL 99   WBC Latest Ref Range: 4.0 - 11.0 10e9/L 3.0 (L)   Hemoglobin Latest Ref Range: 11.7 - 15.7 g/dL 13.1   Hematocrit Latest Ref Range: 35.0 - 47.0 % 39.5   Platelet Count Latest Ref Range: 150 - 450 10e9/L 198   RBC Count Latest Ref Range: 3.8 - 5.2 10e12/L 4.40   MCV Latest Ref Range: 78 - 100 fl 90   MCH Latest Ref Range: 26.5 - 33.0 pg 29.8   MCHC Latest Ref Range: 31.5 - 36.5 g/dL 33.2   RDW Latest Ref Range: 10.0 - 15.0 % 14.6   Diff Method Unknown Manual Differential   % Neutrophils Latest Units: % 62.0   % Lymphocytes Latest Units: % 20.0   % Monocytes Latest Units: % 15.0   % Eosinophils Latest Units: % 2.0   % Basophils Latest Units: % 1.0   Absolute Neutrophil Latest Ref Range: 1.6 - 8.3 10e9/L 1.8   Absolute Lymphocytes Latest Ref  Range: 0.8 - 5.3 10e9/L 0.6 (L)   Absolute Monocytes Latest Ref Range: 0.0 - 1.3 10e9/L 0.5   Absolute Eosinophils Latest Ref Range: 0.0 - 0.7 10e9/L 0.1        ASSESSMENT AND PLAN:   1.  History of stage IIB, T2 N1 M0 invasive lobular carcinoma of the right breast.  It was initially diagnosed in 2006 and at that time it was hormone receptor positive, HER-2 negative.  She was treated on ECOG 2104 protocol with dose-dense Adriamycin, Cytoxan and Avastin x4 cycles followed by Taxol and Avastin x4 cycles followed by a Avastin for 1 year.  She also received radiation to the right breast which she completed in January 2007 (5040 cGy).  She took Aromasin from 2007 to 2014.    Now she has metastatic breast cancer with extensive involvement of the bones.  There is also a left lower lobe hypermetabolic lesion and mediastinal lymph nodes which are hypermetabolic.  The biopsy from the right iliac bone is consistent with metastatic lobular breast cancer but it is hormone receptor and HER-2 negative and PDL 1 is also negative.    She has received 3000 cGy of palliative radiation to T12-L5 from 9/6/2019 till 9/18/2019.    She was started on palliative Xeloda but she was unable to tolerate even the dose reduced medication.        We decided on repeating scans and MRI brain on 10/25/2019 showed no intracranial metastatic disease.   Multiple enhancing calvarial lesions may be increased in number and are suggestive of metastatic disease. Thinner imaging on the current study may identify the smaller lesions and may be responsible for  identified more lesions.   There is a single focus of T2 hyperintense signal within the anterior right temporal lobe may represent interval demyelination. There is no evidence for active demyelination.    PET/CT on 11/1/2019 showed favorable response to therapy and Overall FDG uptake within scattered osseous metastases is decreased since 8/30/2019, particularly within the pelvis. Increased sclerotic  appearance of L1 and L4 pathologic compression deformities, likely sequela of recently completed palliative radiation therapy.    No significant FDG uptake in previously demonstrated hypermetabolic mediastinal lymph nodes. Biopsy negative on 9/5/2019.  There is also decreased FDG uptake in the left lower lobe (biopsy negative for  malignancy), now with dense consolidation containing air bronchograms suggestive of infection versus aspiration.  There is diffuse FDG uptake within the esophagus, consistent with esophagitis.    Because of intolerance to Xeloda we decided on switching to weekly paclitaxel on 11/5/2019.    Overall the chemotherapy is going fairly but she has some side effects which need to be addressed.  We will start cycle #2 today but I plan to decrease the dose of chemotherapy from 80 mg/m  to 70 mg/m  to help her tolerate it better.  If she continues to do well then we will repeat his scans after 3 cycles of chemotherapy.    Nausea and vomiting.  She had significant nausea and vomiting and I will add Emend to her premedication.  She will continue to use Compazine and Ativan.  She was also evaluated by palliative care and plan is to enroll her in medical marijuana program.      Pain management.  She has widespread metastatic breast cancer in the bones.  Her main pain is in the mid back and rib cage area.  She is taking 2 oxycodone twice a day which is controlling the pain.  Previously she got palliative radiation to T12-L5 3000 cGy in 10 fractions from 9/6/2019 till 9/18/2019.  She was evaluated by orthopedics Dr. Bipin Elliott on 11/1/2019 and conservative management was recommended.  She saw palliative care today.        Bone metastasis.  She got dental clearance to start Zometa and we give it to her on 10/23/2019 and she will get it every 3 months.  She will continue to take vitamin D and calcium.       Fatigue.  Due to cancer and chemotherapy.  I recommend follow-up in cancer rehab program.       Insomnia.  She is sleeping better on trazodone 50 mg at night and she will continue to do that.        We did not address the following today.  Discussion regarding healthcare directives.  On previous visit she told me that she will bring the health care directive for our records but she forgot so I told her to bring it on next visit.      Breast implant removal.  She tells me that she was planning to do breast implant removal because there was a recall on this.  Her surgery was scheduled for 9/24/2019.  Because of this new development of metastatic breast cancer and her recent radiation, surgery has been canceled.  We discussed that at this time treatment for metastatic breast cancer would take precedence over the other surgery as the other surgery would require her to be off chemotherapy for several weeks and in that case the chances of cancer progression would be high and I would not recommend that.    Multiple sclerosis.  She will continue to follow with her neurologist Dr. Quiles.  Currently multiple sclerosis is under control and currently she is not taking any medications.    Return to clinic in 4 weeks before the start of cycle #3.  She tells me that she might go to [place] for a few days around the end of December and if she plans to do that then we will plan cycle #3 accordingly.  She might have a few days delay in getting cycle #3.    All questions answered.  She is agreeable and comfortable with the plan.    Dewayne Zepeda MD

## 2019-12-04 NOTE — PROGRESS NOTES
"Patient's name and  were verified.  See Doc Flowsheet - IV assess for details.  IVAD accessed with 20G 1\" mcnair gripper plus needle  blood return positive: YES  Site without redness, tenderness or swelling: YES  flushed with 30cc NS and 5cc 100u/ml heparin  Needle: left accessed for chemotherapy  Comments: Labs drawn.  Patient tolerated procedure without incident.    Javier Roland  RN, BSN      "

## 2019-12-04 NOTE — PROGRESS NOTES
Infusion Nursing Note:  Shaneka Alvarez presents today for C2D1.    Patient seen by provider today: Yes: Dr. Zepeda   present during visit today: Not Applicable.    Note: N/A.    Intravenous Access:  Implanted Port.    Treatment Conditions:  Lab Results   Component Value Date    HGB 13.1 12/04/2019     Lab Results   Component Value Date    WBC 3.0 12/04/2019      Lab Results   Component Value Date    ANEU 1.8 12/04/2019     Lab Results   Component Value Date     12/04/2019      Lab Results   Component Value Date     12/04/2019                   Lab Results   Component Value Date    POTASSIUM 3.6 12/04/2019           No results found for: MAG         Lab Results   Component Value Date    CR 0.63 12/04/2019                   Lab Results   Component Value Date    RAFAELA 8.9 12/04/2019                Lab Results   Component Value Date    BILITOTAL 0.4 12/04/2019           Lab Results   Component Value Date    ALBUMIN 3.5 12/04/2019                    Lab Results   Component Value Date    ALT 34 12/04/2019           Lab Results   Component Value Date    AST 22 12/04/2019         Post Infusion Assessment:  Patient tolerated infusion without incident.  Site patent and intact, free from redness, edema or discomfort.  No evidence of extravasations.  Access discontinued per protocol.       Discharge Plan:   Patient will return 12/9 for next appointment.   Patient discharged in stable condition accompanied by: self.  Departure Mode: Ambulatory.    Cristina Loaiza RN

## 2019-12-05 ENCOUNTER — ONCOLOGY VISIT (OUTPATIENT)
Dept: ONCOLOGY | Facility: CLINIC | Age: 59
End: 2019-12-05
Attending: HOSPITALIST
Payer: COMMERCIAL

## 2019-12-05 ENCOUNTER — HOSPITAL ENCOUNTER (OUTPATIENT)
Dept: PHYSICAL THERAPY | Facility: CLINIC | Age: 59
Setting detail: THERAPIES SERIES
End: 2019-12-05
Attending: HOSPITALIST
Payer: COMMERCIAL

## 2019-12-05 DIAGNOSIS — F43.21 GRIEF: Primary | ICD-10-CM

## 2019-12-05 DIAGNOSIS — Z51.5 ENCOUNTER FOR PALLIATIVE CARE: ICD-10-CM

## 2019-12-05 DIAGNOSIS — C50.919 METASTATIC BREAST CANCER: ICD-10-CM

## 2019-12-05 DIAGNOSIS — G89.3 CANCER ASSOCIATED PAIN: ICD-10-CM

## 2019-12-05 PROCEDURE — 97110 THERAPEUTIC EXERCISES: CPT | Mod: GP | Performed by: PHYSICAL THERAPIST

## 2019-12-05 PROCEDURE — 97162 PT EVAL MOD COMPLEX 30 MIN: CPT | Mod: GP | Performed by: PHYSICAL THERAPIST

## 2019-12-05 PROCEDURE — 90791 PSYCH DIAGNOSTIC EVALUATION: CPT | Performed by: SOCIAL WORKER

## 2019-12-05 ASSESSMENT — 6 MINUTE WALK TEST (6MWT)
TOTAL DISTANCE WALKED (FT): 846
TOTAL DISTANCE WALKED (METERS): 257
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
## 2019-12-06 DIAGNOSIS — C50.919 METASTATIC BREAST CANCER: ICD-10-CM

## 2019-12-06 DIAGNOSIS — C50.911 BILATERAL MALIGNANT NEOPLASM OF BREAST IN FEMALE, UNSPECIFIED ESTROGEN RECEPTOR STATUS, UNSPECIFIED SITE OF BREAST (H): ICD-10-CM

## 2019-12-06 DIAGNOSIS — C50.912 BILATERAL MALIGNANT NEOPLASM OF BREAST IN FEMALE, UNSPECIFIED ESTROGEN RECEPTOR STATUS, UNSPECIFIED SITE OF BREAST (H): ICD-10-CM

## 2019-12-06 RX ORDER — MEPERIDINE HYDROCHLORIDE 25 MG/ML
25 INJECTION INTRAMUSCULAR; INTRAVENOUS; SUBCUTANEOUS EVERY 30 MIN PRN
Status: CANCELLED | OUTPATIENT
Start: 2019-12-10

## 2019-12-06 RX ORDER — DIPHENHYDRAMINE HCL 25 MG
50 CAPSULE ORAL ONCE
Status: CANCELLED
Start: 2019-12-10

## 2019-12-06 RX ORDER — ALBUTEROL SULFATE 90 UG/1
1-2 AEROSOL, METERED RESPIRATORY (INHALATION)
Status: CANCELLED
Start: 2019-12-10

## 2019-12-06 RX ORDER — ALBUTEROL SULFATE 0.83 MG/ML
2.5 SOLUTION RESPIRATORY (INHALATION)
Status: CANCELLED | OUTPATIENT
Start: 2019-12-10

## 2019-12-06 RX ORDER — DIPHENHYDRAMINE HYDROCHLORIDE 50 MG/ML
50 INJECTION INTRAMUSCULAR; INTRAVENOUS
Status: CANCELLED
Start: 2019-12-10

## 2019-12-06 RX ORDER — EPINEPHRINE 1 MG/ML
0.3 INJECTION, SOLUTION INTRAMUSCULAR; SUBCUTANEOUS EVERY 5 MIN PRN
Status: CANCELLED | OUTPATIENT
Start: 2019-12-10

## 2019-12-06 RX ORDER — METHYLPREDNISOLONE SODIUM SUCCINATE 125 MG/2ML
125 INJECTION, POWDER, LYOPHILIZED, FOR SOLUTION INTRAMUSCULAR; INTRAVENOUS
Status: CANCELLED
Start: 2019-12-10

## 2019-12-06 RX ORDER — SODIUM CHLORIDE 9 MG/ML
1000 INJECTION, SOLUTION INTRAVENOUS CONTINUOUS PRN
Status: CANCELLED
Start: 2019-12-10

## 2019-12-06 RX ORDER — LORAZEPAM 2 MG/ML
0.5 INJECTION INTRAMUSCULAR EVERY 4 HOURS PRN
Status: CANCELLED
Start: 2019-12-10

## 2019-12-06 RX ORDER — EPINEPHRINE 0.3 MG/.3ML
0.3 INJECTION SUBCUTANEOUS EVERY 5 MIN PRN
Status: CANCELLED | OUTPATIENT
Start: 2019-12-10

## 2019-12-06 RX ORDER — NALOXONE HYDROCHLORIDE 0.4 MG/ML
.1-.4 INJECTION, SOLUTION INTRAMUSCULAR; INTRAVENOUS; SUBCUTANEOUS
Status: CANCELLED | OUTPATIENT
Start: 2019-12-10

## 2019-12-10 ENCOUNTER — INFUSION THERAPY VISIT (OUTPATIENT)
Dept: INFUSION THERAPY | Facility: CLINIC | Age: 59
End: 2019-12-10
Payer: COMMERCIAL

## 2019-12-10 ENCOUNTER — ONCOLOGY VISIT (OUTPATIENT)
Dept: ONCOLOGY | Facility: CLINIC | Age: 59
End: 2019-12-10
Payer: COMMERCIAL

## 2019-12-10 VITALS
RESPIRATION RATE: 16 BRPM | BODY MASS INDEX: 25.95 KG/M2 | TEMPERATURE: 97.7 F | HEART RATE: 99 BPM | OXYGEN SATURATION: 100 % | SYSTOLIC BLOOD PRESSURE: 135 MMHG | WEIGHT: 141.9 LBS | DIASTOLIC BLOOD PRESSURE: 97 MMHG

## 2019-12-10 DIAGNOSIS — C50.919 METASTATIC BREAST CANCER: Primary | ICD-10-CM

## 2019-12-10 DIAGNOSIS — G47.00 INSOMNIA, UNSPECIFIED TYPE: ICD-10-CM

## 2019-12-10 DIAGNOSIS — C50.911 BILATERAL MALIGNANT NEOPLASM OF BREAST IN FEMALE, UNSPECIFIED ESTROGEN RECEPTOR STATUS, UNSPECIFIED SITE OF BREAST (H): ICD-10-CM

## 2019-12-10 DIAGNOSIS — C50.912 BILATERAL MALIGNANT NEOPLASM OF BREAST IN FEMALE, UNSPECIFIED ESTROGEN RECEPTOR STATUS, UNSPECIFIED SITE OF BREAST (H): ICD-10-CM

## 2019-12-10 LAB
BASOPHILS # BLD AUTO: 0 10E9/L (ref 0–0.2)
BASOPHILS NFR BLD AUTO: 0.9 %
DIFFERENTIAL METHOD BLD: ABNORMAL
EOSINOPHIL # BLD AUTO: 0.1 10E9/L (ref 0–0.7)
EOSINOPHIL NFR BLD AUTO: 1.4 %
ERYTHROCYTE [DISTWIDTH] IN BLOOD BY AUTOMATED COUNT: 14 % (ref 10–15)
HCT VFR BLD AUTO: 41.7 % (ref 35–47)
HGB BLD-MCNC: 13.9 G/DL (ref 11.7–15.7)
IMM GRANULOCYTES # BLD: 0 10E9/L (ref 0–0.4)
IMM GRANULOCYTES NFR BLD: 1.2 %
LYMPHOCYTES # BLD AUTO: 0.4 10E9/L (ref 0.8–5.3)
LYMPHOCYTES NFR BLD AUTO: 11.8 %
MCH RBC QN AUTO: 30 PG (ref 26.5–33)
MCHC RBC AUTO-ENTMCNC: 33.3 G/DL (ref 31.5–36.5)
MCV RBC AUTO: 90 FL (ref 78–100)
MONOCYTES # BLD AUTO: 0.2 10E9/L (ref 0–1.3)
MONOCYTES NFR BLD AUTO: 6.6 %
NEUTROPHILS # BLD AUTO: 2.7 10E9/L (ref 1.6–8.3)
NEUTROPHILS NFR BLD AUTO: 78.1 %
PLATELET # BLD AUTO: 210 10E9/L (ref 150–450)
RBC # BLD AUTO: 4.63 10E12/L (ref 3.8–5.2)
WBC # BLD AUTO: 3.5 10E9/L (ref 4–11)

## 2019-12-10 PROCEDURE — 99207 ZZC NO CHARGE NURSE ONLY: CPT

## 2019-12-10 PROCEDURE — 85025 COMPLETE CBC W/AUTO DIFF WBC: CPT | Performed by: INTERNAL MEDICINE

## 2019-12-10 PROCEDURE — 96413 CHEMO IV INFUSION 1 HR: CPT | Performed by: INTERNAL MEDICINE

## 2019-12-10 PROCEDURE — 96367 TX/PROPH/DG ADDL SEQ IV INF: CPT | Performed by: INTERNAL MEDICINE

## 2019-12-10 PROCEDURE — 99207 ZZC NO CHARGE LOS: CPT

## 2019-12-10 PROCEDURE — S0028 INJECTION, FAMOTIDINE, 20 MG: HCPCS | Performed by: INTERNAL MEDICINE

## 2019-12-10 PROCEDURE — 96375 TX/PRO/DX INJ NEW DRUG ADDON: CPT | Performed by: INTERNAL MEDICINE

## 2019-12-10 RX ORDER — TRAZODONE HYDROCHLORIDE 50 MG/1
50 TABLET, FILM COATED ORAL AT BEDTIME
Qty: 30 TABLET | Refills: 0 | Status: SHIPPED | OUTPATIENT
Start: 2019-12-10 | End: 2020-01-24

## 2019-12-10 RX ORDER — HEPARIN SODIUM (PORCINE) LOCK FLUSH IV SOLN 100 UNIT/ML 100 UNIT/ML
5 SOLUTION INTRAVENOUS
Status: DISCONTINUED | OUTPATIENT
Start: 2019-12-10 | End: 2019-12-10 | Stop reason: HOSPADM

## 2019-12-10 RX ORDER — DIPHENHYDRAMINE HCL 25 MG
50 CAPSULE ORAL ONCE
Status: COMPLETED | OUTPATIENT
Start: 2019-12-10 | End: 2019-12-10

## 2019-12-10 RX ADMIN — Medication 250 ML: at 07:55

## 2019-12-10 RX ADMIN — HEPARIN SODIUM (PORCINE) LOCK FLUSH IV SOLN 100 UNIT/ML 5 ML: 100 SOLUTION at 07:12

## 2019-12-10 RX ADMIN — Medication 50 MG: at 07:55

## 2019-12-10 ASSESSMENT — PAIN SCALES - GENERAL: PAINLEVEL: NO PAIN (0)

## 2019-12-10 NOTE — PROGRESS NOTES
Infusion Nursing Note:  Shaneka Alvarez presents today for C2D8 Taxol.    Patient seen by provider today: No   present during visit today: Not Applicable.    Note: N/A.    Intravenous Access:  Implanted Port.    Treatment Conditions:  Lab Results   Component Value Date    HGB 13.9 12/10/2019     Lab Results   Component Value Date    WBC 3.5 12/10/2019      Lab Results   Component Value Date    ANEU 2.7 12/10/2019     Lab Results   Component Value Date     12/10/2019          Post Infusion Assessment:  Patient tolerated infusion without incident.  Blood return noted pre and post infusion.  Site patent and intact, free from redness, edema or discomfort.  No evidence of extravasations.  Access discontinued per protocol.       Discharge Plan:   Patient will return 12/16 for next appointment.   Patient discharged in stable condition accompanied by: self.  Departure Mode: Ambulatory.    Cristina Loaiza RN

## 2019-12-10 NOTE — PROGRESS NOTES
See IV flow sheet for details of IVAD access. Labs sent: cbc. IVAD needle left in place for chemotherapy today.    Keira Brooks RN

## 2019-12-12 ENCOUNTER — HOSPITAL ENCOUNTER (OUTPATIENT)
Dept: PHYSICAL THERAPY | Facility: CLINIC | Age: 59
Setting detail: THERAPIES SERIES
End: 2019-12-12
Attending: HOSPITALIST
Payer: COMMERCIAL

## 2019-12-12 ENCOUNTER — ONCOLOGY VISIT (OUTPATIENT)
Dept: ONCOLOGY | Facility: CLINIC | Age: 59
End: 2019-12-12
Payer: COMMERCIAL

## 2019-12-12 DIAGNOSIS — Z51.5 ENCOUNTER FOR PALLIATIVE CARE: ICD-10-CM

## 2019-12-12 DIAGNOSIS — F43.21 GRIEF: Primary | ICD-10-CM

## 2019-12-12 PROCEDURE — 97110 THERAPEUTIC EXERCISES: CPT | Mod: GP | Performed by: PHYSICAL THERAPIST

## 2019-12-12 PROCEDURE — 96152 C HEAL & BEHAV ASSESS,EA 15 MIN,RE-ASSESS: CPT | Performed by: SOCIAL WORKER

## 2019-12-12 PROCEDURE — 97750 PHYSICAL PERFORMANCE TEST: CPT | Mod: GP | Performed by: PHYSICAL THERAPIST

## 2019-12-12 NOTE — PROGRESS NOTES
Functional Gait Assessment (FGA): The FGA assesses postural stability during various walking tasks.   Gait assistive device used: None    Patient Score: 23/30  Scores of <22/30 have been correlated with predicting falls in community-dwelling older adults according to Antonio & Jett 2010.   Scores of <18/30 have been correlated with increased risk for falls in patients with Parkinsons Disease according to Thierry Garcia Zhou et al 2014.  Minimal Detectable Change for patients with acute/chronic stroke = 4.2 according to Thidanitza & Taoschel 2009  Minimal Detectable Change for patients with vestibular disorder = 8 according to Antonio & Jett 2010    Assessment (rationale for performing, application to patient s function & care plan): Used to assess dynamic balance during gait. Patient scored a 23/30 which indicates she is at a low risk of falls. She has a generally slow gait speed and had difficulty ambulating with eyes closed.    Minutes billed as physical performance test: 8     12/12/19 1000   Signing Clinician's Name / Credentials   Signing clinician's name / credentials PERRI Greco   Functional Gait Assessment (BESS Alvarez, Mar GArchana F., et al. (2004))   1. GAIT LEVEL SURFACE 2   2. CHANGE IN GAIT SPEED 3   3. GAIT WITH HORIZONTAL HEAD TURNS 2   4. GAIT WITH VERTICAL HEAD TURNS 3   5. GAIT AND PIVOT TURN 3   6. STEP OVER OBSTACLE 3   7. GAIT WITH NARROW BASE OF SUPPORT 3   8. GAIT WITH EYES CLOSED 0   9. AMBULATING BACKWARDS 2   10. STEPS 2   Total Functional Gait Assessment Score   TOTAL SCORE: (MAXIMUM SCORE 30) 23

## 2019-12-13 DIAGNOSIS — C50.912 BILATERAL MALIGNANT NEOPLASM OF BREAST IN FEMALE, UNSPECIFIED ESTROGEN RECEPTOR STATUS, UNSPECIFIED SITE OF BREAST (H): ICD-10-CM

## 2019-12-13 DIAGNOSIS — C50.919 METASTATIC BREAST CANCER: ICD-10-CM

## 2019-12-13 DIAGNOSIS — C50.911 BILATERAL MALIGNANT NEOPLASM OF BREAST IN FEMALE, UNSPECIFIED ESTROGEN RECEPTOR STATUS, UNSPECIFIED SITE OF BREAST (H): ICD-10-CM

## 2019-12-13 RX ORDER — ALBUTEROL SULFATE 90 UG/1
1-2 AEROSOL, METERED RESPIRATORY (INHALATION)
Status: CANCELLED
Start: 2019-12-16

## 2019-12-13 RX ORDER — ALBUTEROL SULFATE 0.83 MG/ML
2.5 SOLUTION RESPIRATORY (INHALATION)
Status: CANCELLED | OUTPATIENT
Start: 2019-12-16

## 2019-12-13 RX ORDER — METHYLPREDNISOLONE SODIUM SUCCINATE 125 MG/2ML
125 INJECTION, POWDER, LYOPHILIZED, FOR SOLUTION INTRAMUSCULAR; INTRAVENOUS
Status: CANCELLED
Start: 2019-12-16

## 2019-12-13 RX ORDER — DIPHENHYDRAMINE HYDROCHLORIDE 50 MG/ML
50 INJECTION INTRAMUSCULAR; INTRAVENOUS
Status: CANCELLED
Start: 2019-12-16

## 2019-12-13 RX ORDER — NALOXONE HYDROCHLORIDE 0.4 MG/ML
.1-.4 INJECTION, SOLUTION INTRAMUSCULAR; INTRAVENOUS; SUBCUTANEOUS
Status: CANCELLED | OUTPATIENT
Start: 2019-12-16

## 2019-12-13 RX ORDER — EPINEPHRINE 1 MG/ML
0.3 INJECTION, SOLUTION INTRAMUSCULAR; SUBCUTANEOUS EVERY 5 MIN PRN
Status: CANCELLED | OUTPATIENT
Start: 2019-12-16

## 2019-12-13 RX ORDER — LORAZEPAM 2 MG/ML
0.5 INJECTION INTRAMUSCULAR EVERY 4 HOURS PRN
Status: CANCELLED
Start: 2019-12-16

## 2019-12-13 RX ORDER — EPINEPHRINE 0.3 MG/.3ML
0.3 INJECTION SUBCUTANEOUS EVERY 5 MIN PRN
Status: CANCELLED | OUTPATIENT
Start: 2019-12-16

## 2019-12-13 RX ORDER — MEPERIDINE HYDROCHLORIDE 25 MG/ML
25 INJECTION INTRAMUSCULAR; INTRAVENOUS; SUBCUTANEOUS EVERY 30 MIN PRN
Status: CANCELLED | OUTPATIENT
Start: 2019-12-16

## 2019-12-13 RX ORDER — DIPHENHYDRAMINE HCL 25 MG
50 CAPSULE ORAL ONCE
Status: CANCELLED
Start: 2019-12-16

## 2019-12-13 RX ORDER — SODIUM CHLORIDE 9 MG/ML
1000 INJECTION, SOLUTION INTRAVENOUS CONTINUOUS PRN
Status: CANCELLED
Start: 2019-12-16

## 2019-12-13 NOTE — PROGRESS NOTES
Palliative Care Clinical Social Work Return Appointment    PLEASE NOTE:  THIS IS A MENTAL HEALTH NOTE.  OTHER PROVIDERS VIEWING THIS NOTE SHOULD USE THIS ONLY FOR UNDERSTANDING THE CONTEXT OF THE PATIENT'S EXPERIENCE.  TOPICS DESCRIBED IN THIS NOTE SHOULD NOT BE REFERENCED TO THE PATIENT BY MEDICAL PROVIDERS.    Shaneka Alvarez is a 59 year old woman with recurrent metastatic breast cancer , seen today at [facility] for a return psychotherapy appointment to address anxiety as it relates to coping with illness and treatment.    Mental Status Exam:(List all that apply)      Appearance: Appropriate      Eye Contact: Good       Orientation: Yes, x4      Mood: Normal      Affect: Appropriate      Thought Content: Clear         Thought Form: Logical      Psychomotor Behavior: Normal    Visit themes: coping, mind body teaching     Adjustment to Illness: Shaneka presented to today's visit for teaching related to mind/body coping techniques to help with sleep and relaxation    Mental Health (thoughts, feelings, actions, coping, and symptoms): difficulty relaxing leading to difficulty with sleep    Helpful activities: time with family, time with dogs, continues to focus on gratitude     Helpful cognitions:     Unhelpful and/or triggering or exacerbating factors:     Body-Mind Skills Education:   Self hypnosis training was discussed and explained.  Preferences were determined.  Patient was then led in a self hypnosis training session.  Basic eye closing was used for induction. Image of walking down a path was used for deepening.  Techniques used included ego strengthening, progressive relaxation.  .  Following the session the patient reported feeling more relaxed, she planned to go home and take a nap.  Pre-hypnosis training self reported measure of  anxiety was low.  Post training self reported measure was remained low.    Relationships: Family remains supportive    End of Life and Advance Care Planning:  not discussed today    Main therapeutic interventions provided this session include:  Provide psychoeducation and Provide behavioral intervention training    Plan:  Will return for psychotherapy with palliative care focus at her convenience     Time spent with patient/family:  50 minutes (Start 9:10, end 10:00)    CHARLOTTE Freire, [facility]   Palliative Care    Pgr:[phone]  Ph: [phone]    DO NOT SEND ANY LETTERS

## 2019-12-16 ENCOUNTER — INFUSION THERAPY VISIT (OUTPATIENT)
Dept: INFUSION THERAPY | Facility: CLINIC | Age: 59
End: 2019-12-16
Payer: COMMERCIAL

## 2019-12-16 ENCOUNTER — ONCOLOGY VISIT (OUTPATIENT)
Dept: ONCOLOGY | Facility: CLINIC | Age: 59
End: 2019-12-16
Payer: COMMERCIAL

## 2019-12-16 VITALS
OXYGEN SATURATION: 99 % | BODY MASS INDEX: 27.31 KG/M2 | WEIGHT: 149.3 LBS | RESPIRATION RATE: 18 BRPM | DIASTOLIC BLOOD PRESSURE: 84 MMHG | HEART RATE: 58 BPM | SYSTOLIC BLOOD PRESSURE: 129 MMHG | TEMPERATURE: 98 F

## 2019-12-16 DIAGNOSIS — C50.912 BILATERAL MALIGNANT NEOPLASM OF BREAST IN FEMALE, UNSPECIFIED ESTROGEN RECEPTOR STATUS, UNSPECIFIED SITE OF BREAST (H): ICD-10-CM

## 2019-12-16 DIAGNOSIS — C50.911 BILATERAL MALIGNANT NEOPLASM OF BREAST IN FEMALE, UNSPECIFIED ESTROGEN RECEPTOR STATUS, UNSPECIFIED SITE OF BREAST (H): ICD-10-CM

## 2019-12-16 DIAGNOSIS — C50.919 METASTATIC BREAST CANCER: Primary | ICD-10-CM

## 2019-12-16 LAB
BASOPHILS # BLD AUTO: 0 10E9/L (ref 0–0.2)
BASOPHILS NFR BLD AUTO: 1.2 %
DIFFERENTIAL METHOD BLD: ABNORMAL
EOSINOPHIL # BLD AUTO: 0.1 10E9/L (ref 0–0.7)
EOSINOPHIL NFR BLD AUTO: 2.3 %
ERYTHROCYTE [DISTWIDTH] IN BLOOD BY AUTOMATED COUNT: 14.3 % (ref 10–15)
HCT VFR BLD AUTO: 34.1 % (ref 35–47)
HGB BLD-MCNC: 11.3 G/DL (ref 11.7–15.7)
IMM GRANULOCYTES # BLD: 0 10E9/L (ref 0–0.4)
IMM GRANULOCYTES NFR BLD: 1.6 %
LYMPHOCYTES # BLD AUTO: 0.6 10E9/L (ref 0.8–5.3)
LYMPHOCYTES NFR BLD AUTO: 21.7 %
MCH RBC QN AUTO: 30.3 PG (ref 26.5–33)
MCHC RBC AUTO-ENTMCNC: 33.1 G/DL (ref 31.5–36.5)
MCV RBC AUTO: 91 FL (ref 78–100)
MONOCYTES # BLD AUTO: 0.2 10E9/L (ref 0–1.3)
MONOCYTES NFR BLD AUTO: 7.8 %
NEUTROPHILS # BLD AUTO: 1.7 10E9/L (ref 1.6–8.3)
NEUTROPHILS NFR BLD AUTO: 65.4 %
PLATELET # BLD AUTO: 179 10E9/L (ref 150–450)
RBC # BLD AUTO: 3.73 10E12/L (ref 3.8–5.2)
WBC # BLD AUTO: 2.6 10E9/L (ref 4–11)

## 2019-12-16 PROCEDURE — 99207 ZZC NO CHARGE NURSE ONLY: CPT

## 2019-12-16 PROCEDURE — 96375 TX/PRO/DX INJ NEW DRUG ADDON: CPT | Performed by: NURSE PRACTITIONER

## 2019-12-16 PROCEDURE — S0028 INJECTION, FAMOTIDINE, 20 MG: HCPCS | Performed by: NURSE PRACTITIONER

## 2019-12-16 PROCEDURE — 96413 CHEMO IV INFUSION 1 HR: CPT | Performed by: NURSE PRACTITIONER

## 2019-12-16 PROCEDURE — 96367 TX/PROPH/DG ADDL SEQ IV INF: CPT | Performed by: NURSE PRACTITIONER

## 2019-12-16 PROCEDURE — 99207 ZZC NO CHARGE LOS: CPT

## 2019-12-16 PROCEDURE — 85025 COMPLETE CBC W/AUTO DIFF WBC: CPT | Performed by: INTERNAL MEDICINE

## 2019-12-16 RX ORDER — HEPARIN SODIUM (PORCINE) LOCK FLUSH IV SOLN 100 UNIT/ML 100 UNIT/ML
5 SOLUTION INTRAVENOUS
Status: DISCONTINUED | OUTPATIENT
Start: 2019-12-16 | End: 2019-12-16 | Stop reason: HOSPADM

## 2019-12-16 RX ORDER — DIPHENHYDRAMINE HCL 25 MG
50 CAPSULE ORAL ONCE
Status: COMPLETED | OUTPATIENT
Start: 2019-12-16 | End: 2019-12-16

## 2019-12-16 RX ADMIN — HEPARIN SODIUM (PORCINE) LOCK FLUSH IV SOLN 100 UNIT/ML 5 ML: 100 SOLUTION at 16:50

## 2019-12-16 RX ADMIN — Medication 50 MG: at 14:52

## 2019-12-16 RX ADMIN — HEPARIN SODIUM (PORCINE) LOCK FLUSH IV SOLN 100 UNIT/ML 5 ML: 100 SOLUTION at 14:14

## 2019-12-16 RX ADMIN — Medication 250 ML: at 14:47

## 2019-12-16 ASSESSMENT — PAIN SCALES - GENERAL: PAINLEVEL: NO PAIN (0)

## 2019-12-16 NOTE — PROGRESS NOTES
Port accessed with no incidient. Blood return noted. Labs drawn, tubes labeled and double signed. Port flushed with saline and heparin. Patient tolerated port draw well.   Cristina Loaiza RN

## 2019-12-16 NOTE — PROGRESS NOTES
Infusion Nursing Note:  Shaneka Alvarez presents today for taxol.    Patient seen by provider today: No   present during visit today: Not Applicable.    Note: N/A.    Intravenous Access:  Peripheral IV placed.    Treatment Conditions:  Lab Results   Component Value Date    HGB 11.3 12/16/2019     Lab Results   Component Value Date    WBC 2.6 12/16/2019      Lab Results   Component Value Date    ANEU 1.7 12/16/2019     Lab Results   Component Value Date     12/16/2019      Lab Results   Component Value Date     12/04/2019                   Lab Results   Component Value Date    POTASSIUM 3.6 12/04/2019           No results found for: MAG         Lab Results   Component Value Date    CR 0.63 12/04/2019                   Lab Results   Component Value Date    RAFAELA 8.9 12/04/2019                Lab Results   Component Value Date    BILITOTAL 0.4 12/04/2019           Lab Results   Component Value Date    ALBUMIN 3.5 12/04/2019                    Lab Results   Component Value Date    ALT 34 12/04/2019           Lab Results   Component Value Date    AST 22 12/04/2019       Results reviewed, labs MET treatment parameters, ok to proceed with treatment.      Post Infusion Assessment:  Patient tolerated infusion without incident.  Site patent and intact, free from redness, edema or discomfort.  No evidence of extravasations.  Access discontinued per protocol.       Discharge Plan:   Patient and/or family verbalized understanding of discharge instructions and all questions answered.    Emmy Miller RN

## 2019-12-19 ENCOUNTER — TELEPHONE (OUTPATIENT)
Dept: SPIRITUAL SERVICES | Facility: CLINIC | Age: 59
End: 2019-12-19

## 2019-12-19 NOTE — TELEPHONE ENCOUNTER
"SPIRITUAL HEALTH SERVICES  SPIRITUAL ASSESSMENT Progress Note  [facility] Oncology  Care      PRIMARY FOCUS:     Emotional/spiritual/Zoroastrian distress    Support for coping    REFERRAL SOURCE: Patient requested a [facility] encounter     ILLNESS CIRCUMSTANCES:   Reviewed documentation. Reflective conversation shared with patient  which integrated elements of illness and family narratives.     Context of Serious Illness/Symptom(s) - Patient was treated for cancer 12 years ago and was \"promised\" (her words) that her cancer was gone.  Her  breast cancer has now returned and she is having chemotherapy every week for 3 weeks and than 1 week off.      Resources for Support - Patient is  to [place] and she has a daughter who lives here in the area.  She also shared that her oldest daughter lives in [place] and calls often.      DISTRESS:     Emotional/Spiritual/Existential Distress - She states that having her cancer return after so many years has been rough.     Episcopalian Distress - She was raised Syn[employer] but her family did not attend services so she does not have the support of a Quaker or .  She does have some concern about what the afterlife might bring.  She is of course most concerned about heaven and hell.     Social/Cultural/Economic Distress - Patient voiced concern about how her  has been struggling  with her cancer diagnosis.   She states that there has been some conflict between them.  She is concerned that he works all the time.  Intervention: Reflected with her about her fears and concerns about the afterlife.  Offered a re imagining of God's mercy.  Offered prayer and she voiced her appreciation.  I offered support for her  and she asked that I try to call him to check in with him sometime in the next few weeks. I plan to do an initial call to see if he has any support services.    SPIRITUAL/Bahai COPING:     Congregation/Krystina - Synagogue    Spiritual " "Practice(s) - Patient reports that she still has yvette and spends time in personal reflection, looking inward.  \"I do a lot of praying\".  She recently ordered a book about near death experiences.     Emotional/Relational/Existential Connections - Patient states that she grew up [place] farm in [place] and was raised to be \"tough\". She reports that she finds strength from within.     GOALS OF CARE:    Goals of Care - Support for her spiritual health.     Meaning/Sense-Making - Not discussed today.     PLAN: I let the patient know of my availability for  support.  I gave her my contact information and will let her decide on how much follow up she would like in the future.  I will call and reach out to her  early next month.     Gina Pineda  Staff     "

## 2019-12-24 ENCOUNTER — HOSPITAL ENCOUNTER (EMERGENCY)
Facility: CLINIC | Age: 59
Discharge: HOME | End: 2019-12-24
Attending: EMERGENCY MEDICINE | Admitting: EMERGENCY MEDICINE
Payer: COMMERCIAL

## 2019-12-24 ENCOUNTER — TELEPHONE (OUTPATIENT)
Dept: ONCOLOGY | Facility: CLINIC | Age: 59
End: 2019-12-24

## 2019-12-24 VITALS
WEIGHT: 136.6 LBS | OXYGEN SATURATION: 100 % | TEMPERATURE: 98.7 F | SYSTOLIC BLOOD PRESSURE: 128 MMHG | BODY MASS INDEX: 24.98 KG/M2 | DIASTOLIC BLOOD PRESSURE: 78 MMHG | RESPIRATION RATE: 16 BRPM

## 2019-12-24 DIAGNOSIS — E86.0 DEHYDRATION: ICD-10-CM

## 2019-12-24 LAB
ANION GAP SERPL CALCULATED.3IONS-SCNC: 10 MMOL/L (ref 3–14)
BASOPHILS # BLD AUTO: 0.1 10E9/L (ref 0–0.2)
BASOPHILS NFR BLD AUTO: 1 %
BUN SERPL-MCNC: 8 MG/DL (ref 7–30)
CALCIUM SERPL-MCNC: 8.9 MG/DL (ref 8.5–10.1)
CHLORIDE SERPL-SCNC: 103 MMOL/L (ref 94–109)
CO2 SERPL-SCNC: 24 MMOL/L (ref 20–32)
CREAT SERPL-MCNC: 0.57 MG/DL (ref 0.52–1.04)
DIFFERENTIAL METHOD BLD: ABNORMAL
EOSINOPHIL NFR BLD AUTO: 0.2 %
ERYTHROCYTE [DISTWIDTH] IN BLOOD BY AUTOMATED COUNT: 14.3 % (ref 10–15)
GFR SERPL CREATININE-BSD FRML MDRD: >90 ML/MIN/{1.73_M2}
GLUCOSE SERPL-MCNC: 99 MG/DL (ref 70–99)
HCT VFR BLD AUTO: 43.3 % (ref 35–47)
HGB BLD-MCNC: 13.9 G/DL (ref 11.7–15.7)
IMM GRANULOCYTES # BLD: 0.1 10E9/L (ref 0–0.4)
IMM GRANULOCYTES NFR BLD: 2.5 %
LYMPHOCYTES # BLD AUTO: 0.5 10E9/L (ref 0.8–5.3)
LYMPHOCYTES NFR BLD AUTO: 10.3 %
MCH RBC QN AUTO: 29.7 PG (ref 26.5–33)
MCHC RBC AUTO-ENTMCNC: 32.1 G/DL (ref 31.5–36.5)
MCV RBC AUTO: 93 FL (ref 78–100)
MONOCYTES # BLD AUTO: 0.8 10E9/L (ref 0–1.3)
MONOCYTES NFR BLD AUTO: 15.9 %
NEUTROPHILS # BLD AUTO: 3.3 10E9/L (ref 1.6–8.3)
NEUTROPHILS NFR BLD AUTO: 70.1 %
NRBC # BLD AUTO: 0 10*3/UL
NRBC BLD AUTO-RTO: 0 /100
PLATELET # BLD AUTO: 256 10E9/L (ref 150–450)
POTASSIUM SERPL-SCNC: 3.3 MMOL/L (ref 3.4–5.3)
RBC # BLD AUTO: 4.68 10E12/L (ref 3.8–5.2)
SODIUM SERPL-SCNC: 137 MMOL/L (ref 133–144)
WBC # BLD AUTO: 4.8 10E9/L (ref 4–11)

## 2019-12-24 PROCEDURE — 96361 HYDRATE IV INFUSION ADD-ON: CPT | Performed by: EMERGENCY MEDICINE

## 2019-12-24 PROCEDURE — 99284 EMERGENCY DEPT VISIT MOD MDM: CPT | Mod: Z6 | Performed by: EMERGENCY MEDICINE

## 2019-12-24 PROCEDURE — 25800030 ZZH RX IP 258 OP 636: Performed by: EMERGENCY MEDICINE

## 2019-12-24 PROCEDURE — 80048 BASIC METABOLIC PNL TOTAL CA: CPT | Performed by: EMERGENCY MEDICINE

## 2019-12-24 PROCEDURE — 99284 EMERGENCY DEPT VISIT MOD MDM: CPT | Mod: 25 | Performed by: EMERGENCY MEDICINE

## 2019-12-24 PROCEDURE — 85025 COMPLETE CBC W/AUTO DIFF WBC: CPT | Performed by: EMERGENCY MEDICINE

## 2019-12-24 PROCEDURE — 96374 THER/PROPH/DIAG INJ IV PUSH: CPT | Performed by: EMERGENCY MEDICINE

## 2019-12-24 PROCEDURE — 25000128 H RX IP 250 OP 636: Performed by: EMERGENCY MEDICINE

## 2019-12-24 PROCEDURE — 36415 COLL VENOUS BLD VENIPUNCTURE: CPT | Performed by: EMERGENCY MEDICINE

## 2019-12-24 RX ORDER — HEPARIN SODIUM (PORCINE) LOCK FLUSH IV SOLN 100 UNIT/ML 100 UNIT/ML
5 SOLUTION INTRAVENOUS
Status: DISCONTINUED | OUTPATIENT
Start: 2019-12-24 | End: 2019-12-24 | Stop reason: HOSPADM

## 2019-12-24 RX ADMIN — PROCHLORPERAZINE EDISYLATE 10 MG: 5 INJECTION INTRAMUSCULAR; INTRAVENOUS at 16:15

## 2019-12-24 RX ADMIN — SODIUM CHLORIDE 1000 ML: 9 INJECTION, SOLUTION INTRAVENOUS at 16:15

## 2019-12-24 RX ADMIN — Medication 5 ML: at 18:23

## 2019-12-24 RX ADMIN — SODIUM CHLORIDE 1000 ML: 9 INJECTION, SOLUTION INTRAVENOUS at 17:16

## 2019-12-24 NOTE — TELEPHONE ENCOUNTER
Received vm from patient. Reports she has had nausea and vomiting since her last chemo 1 week ago. Vomits sometimes immediately or 1 hour  after eating or drinking anything. Even vomits water. A little diarrhea. Has lost 8 lbs in the past week. No fevers.  Returned call to patient. Reports she is having a very small amount of diarrhea. C/o dizziness, lightheadedness. Eating very little. Drinking fluids but vomits most of them. Taking lorazepam and compazine as much as prescribed. Unable to tolerate Zofran due to migraines. Denies tachycardia or dyspnea.   Advised patient to go to ED as clinic and infusion center closing shortly for the holiday.  Patient expressed understanding and is in agreement. Will go to [facility].  Catarina Mendoza  RN, BSN, OCN

## 2019-12-24 NOTE — ED AVS SNAPSHOT
[facility] Emergency Department  [address] [place] [address]  Phone:  [phone]  Fax:  [phone]                                    Shaneka Alvarez   MRN: [id number]    Department:  [facility] Emergency Department   Date of Visit:  12/24/2019           After Visit Summary Signature Page    I have received my discharge instructions, and my questions have been answered. I have discussed any challenges I see with this plan with the nurse or doctor.    ..........................................................................................................................................  Patient/Patient Representative Signature      ..........................................................................................................................................  Patient Representative Print Name and Relationship to Patient    ..................................................               ................................................  Date                                   Time    ..........................................................................................................................................  Reviewed by Signature/Title    ...................................................              ..............................................  Date                                               Time          22EPIC Rev 08/18

## 2019-12-24 NOTE — ED NOTES
Accessed port with a #20 needle 1 inch. Got 5 ml blood return and it flushes well but was unable to get any more blood back.

## 2019-12-24 NOTE — ED TRIAGE NOTES
Here with nausea and vomiting. States that she has not felt well since her Chemo one week ago. She has had increase nausea and vomiting and was told to come in for fluids.

## 2019-12-30 ENCOUNTER — ONCOLOGY VISIT (OUTPATIENT)
Dept: ONCOLOGY | Facility: CLINIC | Age: 59
End: 2019-12-30
Payer: COMMERCIAL

## 2019-12-30 ENCOUNTER — TELEPHONE (OUTPATIENT)
Dept: ONCOLOGY | Facility: CLINIC | Age: 59
End: 2019-12-30

## 2019-12-30 ENCOUNTER — INFUSION THERAPY VISIT (OUTPATIENT)
Dept: INFUSION THERAPY | Facility: CLINIC | Age: 59
End: 2019-12-30
Payer: COMMERCIAL

## 2019-12-30 ENCOUNTER — ONCOLOGY VISIT (OUTPATIENT)
Dept: ONCOLOGY | Facility: CLINIC | Age: 59
End: 2019-12-30
Attending: INTERNAL MEDICINE
Payer: COMMERCIAL

## 2019-12-30 VITALS
HEIGHT: 62 IN | DIASTOLIC BLOOD PRESSURE: 101 MMHG | RESPIRATION RATE: 16 BRPM | TEMPERATURE: 97.9 F | OXYGEN SATURATION: 99 % | HEART RATE: 74 BPM | SYSTOLIC BLOOD PRESSURE: 148 MMHG | BODY MASS INDEX: 26.67 KG/M2 | WEIGHT: 144.9 LBS

## 2019-12-30 DIAGNOSIS — C50.912 BILATERAL MALIGNANT NEOPLASM OF BREAST IN FEMALE, UNSPECIFIED ESTROGEN RECEPTOR STATUS, UNSPECIFIED SITE OF BREAST (H): ICD-10-CM

## 2019-12-30 DIAGNOSIS — C79.51 BONE METASTASES: Primary | ICD-10-CM

## 2019-12-30 DIAGNOSIS — C50.919 METASTATIC BREAST CANCER: Primary | ICD-10-CM

## 2019-12-30 DIAGNOSIS — C50.911 BILATERAL MALIGNANT NEOPLASM OF BREAST IN FEMALE, UNSPECIFIED ESTROGEN RECEPTOR STATUS, UNSPECIFIED SITE OF BREAST (H): ICD-10-CM

## 2019-12-30 DIAGNOSIS — C50.919 METASTATIC BREAST CANCER: ICD-10-CM

## 2019-12-30 DIAGNOSIS — C50.919 CARCINOMA OF BREAST METASTATIC TO BONE, UNSPECIFIED LATERALITY (H): ICD-10-CM

## 2019-12-30 DIAGNOSIS — C79.51 CARCINOMA OF BREAST METASTATIC TO BONE, UNSPECIFIED LATERALITY (H): ICD-10-CM

## 2019-12-30 LAB
ALBUMIN SERPL-MCNC: 3 G/DL (ref 3.4–5)
ALP SERPL-CCNC: 84 U/L (ref 40–150)
ALT SERPL W P-5'-P-CCNC: 22 U/L (ref 0–50)
ANION GAP SERPL CALCULATED.3IONS-SCNC: 4 MMOL/L (ref 3–14)
AST SERPL W P-5'-P-CCNC: 18 U/L (ref 0–45)
BASOPHILS # BLD AUTO: 0 10E9/L (ref 0–0.2)
BASOPHILS NFR BLD AUTO: 1 %
BILIRUB SERPL-MCNC: 0.3 MG/DL (ref 0.2–1.3)
BUN SERPL-MCNC: 4 MG/DL (ref 7–30)
CALCIUM SERPL-MCNC: 8.3 MG/DL (ref 8.5–10.1)
CANCER AG27-29 SERPL-ACNC: 187 U/ML (ref 0–39)
CHLORIDE SERPL-SCNC: 107 MMOL/L (ref 94–109)
CO2 SERPL-SCNC: 28 MMOL/L (ref 20–32)
CREAT SERPL-MCNC: 0.51 MG/DL (ref 0.52–1.04)
DIFFERENTIAL METHOD BLD: ABNORMAL
EOSINOPHIL # BLD AUTO: 0 10E9/L (ref 0–0.7)
EOSINOPHIL NFR BLD AUTO: 1.3 %
ERYTHROCYTE [DISTWIDTH] IN BLOOD BY AUTOMATED COUNT: 14 % (ref 10–15)
GFR SERPL CREATININE-BSD FRML MDRD: >90 ML/MIN/{1.73_M2}
GLUCOSE SERPL-MCNC: 99 MG/DL (ref 70–99)
HCT VFR BLD AUTO: 35.1 % (ref 35–47)
HGB BLD-MCNC: 11.6 G/DL (ref 11.7–15.7)
IMM GRANULOCYTES # BLD: 0 10E9/L (ref 0–0.4)
IMM GRANULOCYTES NFR BLD: 0.7 %
LYMPHOCYTES # BLD AUTO: 0.4 10E9/L (ref 0.8–5.3)
LYMPHOCYTES NFR BLD AUTO: 13.6 %
MCH RBC QN AUTO: 30.3 PG (ref 26.5–33)
MCHC RBC AUTO-ENTMCNC: 33 G/DL (ref 31.5–36.5)
MCV RBC AUTO: 92 FL (ref 78–100)
MONOCYTES # BLD AUTO: 0.6 10E9/L (ref 0–1.3)
MONOCYTES NFR BLD AUTO: 18.3 %
NEUTROPHILS # BLD AUTO: 2 10E9/L (ref 1.6–8.3)
NEUTROPHILS NFR BLD AUTO: 65.1 %
PLATELET # BLD AUTO: 161 10E9/L (ref 150–450)
POTASSIUM SERPL-SCNC: 3.8 MMOL/L (ref 3.4–5.3)
PROT SERPL-MCNC: 6 G/DL (ref 6.8–8.8)
RBC # BLD AUTO: 3.83 10E12/L (ref 3.8–5.2)
SODIUM SERPL-SCNC: 139 MMOL/L (ref 133–144)
WBC # BLD AUTO: 3 10E9/L (ref 4–11)

## 2019-12-30 PROCEDURE — 96413 CHEMO IV INFUSION 1 HR: CPT | Performed by: INTERNAL MEDICINE

## 2019-12-30 PROCEDURE — 85025 COMPLETE CBC W/AUTO DIFF WBC: CPT | Performed by: INTERNAL MEDICINE

## 2019-12-30 PROCEDURE — 96367 TX/PROPH/DG ADDL SEQ IV INF: CPT | Performed by: INTERNAL MEDICINE

## 2019-12-30 PROCEDURE — 99214 OFFICE O/P EST MOD 30 MIN: CPT | Mod: 25 | Performed by: INTERNAL MEDICINE

## 2019-12-30 PROCEDURE — 86300 IMMUNOASSAY TUMOR CA 15-3: CPT | Performed by: INTERNAL MEDICINE

## 2019-12-30 PROCEDURE — 96375 TX/PRO/DX INJ NEW DRUG ADDON: CPT | Performed by: INTERNAL MEDICINE

## 2019-12-30 PROCEDURE — S0028 INJECTION, FAMOTIDINE, 20 MG: HCPCS | Performed by: INTERNAL MEDICINE

## 2019-12-30 PROCEDURE — 99207 ZZC NO CHARGE NURSE ONLY: CPT

## 2019-12-30 PROCEDURE — 80053 COMPREHEN METABOLIC PANEL: CPT | Performed by: INTERNAL MEDICINE

## 2019-12-30 RX ORDER — OXYCODONE HYDROCHLORIDE 5 MG/1
5-10 TABLET ORAL EVERY 4 HOURS PRN
Qty: 100 TABLET | Refills: 0 | Status: SHIPPED | OUTPATIENT
Start: 2019-12-30 | End: 2020-02-03

## 2019-12-30 RX ORDER — DIPHENHYDRAMINE HYDROCHLORIDE 50 MG/ML
50 INJECTION INTRAMUSCULAR; INTRAVENOUS
Status: CANCELLED
Start: 2019-12-30

## 2019-12-30 RX ORDER — HEPARIN SODIUM (PORCINE) LOCK FLUSH IV SOLN 100 UNIT/ML 100 UNIT/ML
5 SOLUTION INTRAVENOUS
Status: DISCONTINUED | OUTPATIENT
Start: 2019-12-30 | End: 2019-12-30 | Stop reason: HOSPADM

## 2019-12-30 RX ORDER — DIPHENHYDRAMINE HCL 25 MG
50 CAPSULE ORAL ONCE
Status: COMPLETED | OUTPATIENT
Start: 2019-12-30 | End: 2019-12-30

## 2019-12-30 RX ORDER — NALOXONE HYDROCHLORIDE 0.4 MG/ML
.1-.4 INJECTION, SOLUTION INTRAMUSCULAR; INTRAVENOUS; SUBCUTANEOUS
Status: CANCELLED | OUTPATIENT
Start: 2019-12-30

## 2019-12-30 RX ORDER — SODIUM CHLORIDE 9 MG/ML
1000 INJECTION, SOLUTION INTRAVENOUS CONTINUOUS PRN
Status: CANCELLED
Start: 2019-12-30

## 2019-12-30 RX ORDER — DIPHENHYDRAMINE HCL 25 MG
50 CAPSULE ORAL ONCE
Status: CANCELLED
Start: 2019-12-30

## 2019-12-30 RX ORDER — MEPERIDINE HYDROCHLORIDE 25 MG/ML
25 INJECTION INTRAMUSCULAR; INTRAVENOUS; SUBCUTANEOUS EVERY 30 MIN PRN
Status: CANCELLED | OUTPATIENT
Start: 2019-12-30

## 2019-12-30 RX ORDER — EPINEPHRINE 0.3 MG/.3ML
0.3 INJECTION SUBCUTANEOUS EVERY 5 MIN PRN
Status: CANCELLED | OUTPATIENT
Start: 2019-12-30

## 2019-12-30 RX ORDER — LORAZEPAM 2 MG/ML
0.5 INJECTION INTRAMUSCULAR EVERY 4 HOURS PRN
Status: CANCELLED
Start: 2019-12-30

## 2019-12-30 RX ORDER — METHYLPREDNISOLONE SODIUM SUCCINATE 125 MG/2ML
125 INJECTION, POWDER, LYOPHILIZED, FOR SOLUTION INTRAMUSCULAR; INTRAVENOUS
Status: CANCELLED
Start: 2019-12-30

## 2019-12-30 RX ORDER — EPINEPHRINE 1 MG/ML
0.3 INJECTION, SOLUTION INTRAMUSCULAR; SUBCUTANEOUS EVERY 5 MIN PRN
Status: CANCELLED | OUTPATIENT
Start: 2019-12-30

## 2019-12-30 RX ORDER — ALBUTEROL SULFATE 90 UG/1
1-2 AEROSOL, METERED RESPIRATORY (INHALATION)
Status: CANCELLED
Start: 2019-12-30

## 2019-12-30 RX ORDER — ALBUTEROL SULFATE 0.83 MG/ML
2.5 SOLUTION RESPIRATORY (INHALATION)
Status: CANCELLED | OUTPATIENT
Start: 2019-12-30

## 2019-12-30 RX ADMIN — HEPARIN SODIUM (PORCINE) LOCK FLUSH IV SOLN 100 UNIT/ML 5 ML: 100 SOLUTION at 10:14

## 2019-12-30 RX ADMIN — Medication 250 ML: at 11:51

## 2019-12-30 RX ADMIN — Medication 50 MG: at 11:53

## 2019-12-30 ASSESSMENT — MIFFLIN-ST. JEOR: SCORE: 1195.64

## 2019-12-30 ASSESSMENT — PAIN SCALES - GENERAL: PAINLEVEL: MODERATE PAIN (5)

## 2019-12-30 NOTE — TELEPHONE ENCOUNTER
Called patient on 12/29 to inform her NP was out of the office and she would be seeing Dr Zepeda and have a change in appt times.  Labs at 1000, appt with Dr Zepeda at 1045, infusion at 1500.  Voicemail full.  Could not leave message.  Called twice.  Schedulers called patient on 12/30 and was able to relay message.      Demi Amaro RN BSN OCN PHN  Patient Care Manager

## 2019-12-30 NOTE — LETTER
12/30/2019         RE: Shaneka Alvarez  [id number] [facility] [id number]        Dear Colleague,    Thank you for referring your patient, Shaneka Alvarez, to the [facility]. Please see a copy of my visit note below.    ONCOLOGY FOLLOW UP NOTE: 12/30/2019        I took the history from reviewing the previous notes that she was following with Dr. Amaya.  I have copied and updated from prior notes.    ONCOLOGY History:    1.  January 2006:  Diagnosed with Stage IIB, T2 N1 M0 invasive lobular carcinoma of the right breast.  Final pathology showed a 4.5 x 3.8 x 2.5 cm, 01/14 lymph nodes positive.  Estrogen, progesterone receptor positive, HER-2 negative.     2.  Genetics.  BRCA1 and 2 mutations not detected. 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       THERAPY TO DATE:   1.  2006:  ECOG 2104 protocol of dose-dense Adriamycin, Cytoxan and Avastin x4 cycles followed by Taxol and Avastin x4 cycles.   2.  03/2007:  Completed 1 year Avastin.   3.  11/2006-01/2007:  Radiotherapy to the right breast of 5040 cGy.   4.  03/[id number]:  Aromasin.  Stopped after moving to the [facility].   5.  01/2016:  Right modified radical mastectomy with latissimus dorsi flap reconstruction and a left prophylactic mastectomy with latissimus dorsi flap reconstruction.     She recently had MRI of the brain as a follow-up for multiple sclerosis and there were multiple calvarial lesions noted which was suspicious for metastatic disease.  There was no evidence of new multiple sclerosis lesions.  She had a PET scan on 8/30/2019 which showed widespread bone metastatic lesions (calvarium, spine, sacrum, pelvis, ribs most prominent at C7, T3, L1 and L4), hypermetabolic 3 cm lesion in LLL, and mediastinal/hilar LN. CEA wnl at 1.9 and C27-29 elevated to 415 (28 on 8/23/16). A CT guided biopsy of the right iliac bone was obtained on 9/4/19, pathology consistent with metastatic adenocarcinoma (breast), ER/IL negative,  HER-2 negative PDL 1 < 1%. A second biopsy was take of the LLL nodule via EBUS on 9/5/19 did not show any malignancy.    C/T/L spine MRI 8/3/19 to 9/2/19 with numerous enhancing lesions of the C, T and L spine, largest around T9 and L1. No evidence of cord compression. Has a L1 compression fracture and impending L4.     Received her radiation T12-L5 3000 cGy in 10 fractions from 9/6/2019 till 9/18/2019.  Neurosurgery recommended no surgical intervention but to wear [place] brace when out of bed and HOB >30 degrees    She started palliative Xeloda 2000/1500 on 10/1/2019 but after just a few doses she noticed nausea, red eyes blurred vision, loss of appetite.  She stopped taking it after 5 doses and was seen by nurse practitioner on 10/7/2019 when she was improving.  At that point she was restarted on Xeloda at a lower dose of 1000 mg twice a day.  As she was not able to tolerate even the lower dose with extreme nausea and vomiting and feeling extremely fatigued and blurry vision, we decided on stopping Xeloda.    We decided on repeating scans and MRI brain on 10/25/2019 showed no intracranial metastatic disease.   Multiple enhancing calvarial lesions may be increased in number and are suggestive of metastatic disease. Thinner imaging on the current study may identify the smaller lesions and may be responsible for  identified more lesions.   There is a single focus of T2 hyperintense signal within the anterior right temporal lobe may represent interval demyelination. There is no evidence for active demyelination.    PET/CT on 11/1/2019 showed favorable response to therapy and Overall FDG uptake within scattered osseous metastases is decreased since 8/30/2019, particularly within the pelvis. Increased sclerotic appearance of L1 and L4 pathologic compression deformities, likely sequela of recently completed palliative radiation therapy.    No significant FDG uptake in previously demonstrated hypermetabolic mediastinal  lymph nodes. Biopsy negative on 9/5/2019.  There is also decreased FDG uptake in the left lower lobe (biopsy negative for  malignancy), now with dense consolidation containing air bronchograms suggestive of infection versus aspiration.  There is diffuse FDG uptake within the esophagus, consistent with esophagitis.    Because of intolerance to Xeloda we decided on switching to weekly paclitaxel on 11/5/2019.    C#2 Taxol 12/4/19- started with 70mg/m2 dose reduction    C#3 Taxol 12/30/2019     I had also recommended starting Zometa so she got dental clearance to start with that.  She received Zometa on 10/23/2019.  We plan to give it every 3 months.    She tells me that overall she is tolerating chemotherapy well.  Days when she had nausea vomiting and diarrhea but she did not take her nausea medications as prescribed and also did not take Imodium so she ended up in the emergency room where she received IV Compazine and fluids which improved her symptoms and she is feeling much better now.  She continues to have back pain and also notices some pain around the right shoulder blade.  She is taking oxycodone on average 6 a day.  Neuropathy is mild and actually better and involves only the left little finger.  She continues to have issues with near vision although far vision is good.  She is wondering if she should see an optometrist.  Headaches have significantly improved.  Overall energy is the same.  Denies any fevers or chills.  No shortness of breath.  No new swellings.  She has baseline balance issues and heat sensitivity and weakness in the legs.      ECOG 2    ROS:  Rest of the comprehensive review of the system was essentially unremarkable.      I reviewed other history in epic as below.       PAST MEDICAL HISTORY:     1.  Breast cancer  2.  Multiple sclerosis.   3.  Depression.   4.  Migraines.   5.  Hypertension.   6.  Cholecystectomy and umbilical hernia repair.     SOCIAL HISTORY: She smoked for 5 years but  "quit many years ago.  Drinks alcohol socially.  She lives with her .  She is a teacher in middle school.       FAMILY HISTORY: She mentions that her mother had breast cancer at 49.  Both grandmothers had breast cancer but she is not sure of the age.  A couple of cousins have cancers.  Patient has 3 children who are healthy.    Current Outpatient Medications   Medication     acetaminophen (TYLENOL) 500 MG tablet     busPIRone (BUSPAR) 15 MG tablet     calcium citrate-vitamin D (CITRACAL) 315-250 MG-UNIT TABS     Cholecalciferol (VITAMIN D3 PO)     LORazepam (ATIVAN) 0.5 MG tablet     magnesium 250 MG tablet     ondansetron (ZOFRAN-ODT) 8 MG ODT tab     oxyCODONE (ROXICODONE) 5 MG tablet     polyethylene glycol (MIRALAX/GLYCOLAX) packet     prochlorperazine (COMPAZINE) 10 MG tablet     propranolol (INDERAL LA) 60 MG 24 hr capsule     senna-docusate (SENOKOT-S/PERICOLACE) 8.6-50 MG tablet     syringe/needle, disp, (B-D INTEGRA SYRINGE) 23G X 1\" 3 ML MISC     tolterodine ER (DETROL LA) 4 MG 24 hr capsule     traZODone (DESYREL) 50 MG tablet     venlafaxine (EFFEXOR-ER) 225 MG TB24 24 hr tablet     diclofenac (VOLTAREN) 1 % topical gel     erenumab-aooe (AIMOVIG 140 DOSE) 70 MG/ML injection     Lidocaine (LIDOCARE) 4 % Patch     naloxone (NARCAN) 1 mg/mL for intranasal kit (2 syringes with 2 mucosal atomizer device)     ranitidine (ZANTAC) 150 MG tablet     No current facility-administered medications for this visit.      Facility-Administered Medications Ordered in Other Visits   Medication     heparin 100 UNIT/ML injection 5 mL     sodium chloride (PF) 0.9% PF flush 10-20 mL          Allergies   Allergen Reactions     Bactrim [Sulfamethoxazole W/Trimethoprim]      Internal bleeding     Copaxone [Glatiramer] Hives          PHYSICAL EXAMINATION:   BP (!) 148/101 (BP Location: Left arm)   Pulse 74   Temp 97.9  F (36.6  C) (Oral)   Resp 16   Ht 1.575 m (5' 2.01\")   Wt 65.7 kg (144 lb 14.4 oz)   SpO2 99%   BMI " 26.50 kg/m     Wt Readings from Last 4 Encounters:   12/30/19 65.7 kg (144 lb 14.4 oz)   12/24/19 62 kg (136 lb 9.6 oz)   12/16/19 67.7 kg (149 lb 4.8 oz)   12/10/19 64.4 kg (141 lb 14.4 oz)     CONSTITUTIONAL: No apparent distress  EYES: PERRLA, without pallor or jaundice  ENT/MOUTH: Ears unremarkable. No oral lesions  CVS: s1s2 normal  RESPIRATORY: Chest is clear  GI: Abdomen is benign  NEURO: Alert and oriented ×3  INTEGUMENT: no concerning skin rashes   LYMPHATIC: no palpable lymphadenopathy  MUSCULOSKELETAL: Mild tenderness in the back on palpation along the right shoulder blade.  EXTREMITIES: no pedal edema  PSYCH: Mentation, mood and affect are appropriate          Labs and imaging reviewed.    Results for KYLE GU (MRN [id number]) as of 12/30/2019 11:17   Ref. Range 12/30/2019 10:15   Sodium Latest Ref Range: 133 - 144 mmol/L 139   Potassium Latest Ref Range: 3.4 - 5.3 mmol/L 3.8   Chloride Latest Ref Range: 94 - 109 mmol/L 107   Carbon Dioxide Latest Ref Range: 20 - 32 mmol/L 28   Urea Nitrogen Latest Ref Range: 7 - 30 mg/dL 4 (L)   Creatinine Latest Ref Range: 0.52 - 1.04 mg/dL 0.51 (L)   GFR Estimate Latest Ref Range: >60 mL/min/1.73_m2 >90   GFR Estimate If Black Latest Ref Range: >60 mL/min/1.73_m2 >90   Calcium Latest Ref Range: 8.5 - 10.1 mg/dL 8.3 (L)   Anion Gap Latest Ref Range: 3 - 14 mmol/L 4   Albumin Latest Ref Range: 3.4 - 5.0 g/dL 3.0 (L)   Protein Total Latest Ref Range: 6.8 - 8.8 g/dL 6.0 (L)   Bilirubin Total Latest Ref Range: 0.2 - 1.3 mg/dL 0.3   Alkaline Phosphatase Latest Ref Range: 40 - 150 U/L 84   ALT Latest Ref Range: 0 - 50 U/L 22   AST Latest Ref Range: 0 - 45 U/L 18   Glucose Latest Ref Range: 70 - 99 mg/dL 99   WBC Latest Ref Range: 4.0 - 11.0 10e9/L 3.0 (L)   Hemoglobin Latest Ref Range: 11.7 - 15.7 g/dL 11.6 (L)   Hematocrit Latest Ref Range: 35.0 - 47.0 % 35.1   Platelet Count Latest Ref Range: 150 - 450 10e9/L 161   RBC Count Latest Ref Range: 3.8 - 5.2 10e12/L 3.83    MCV Latest Ref Range: 78 - 100 fl 92   MCH Latest Ref Range: 26.5 - 33.0 pg 30.3   MCHC Latest Ref Range: 31.5 - 36.5 g/dL 33.0   RDW Latest Ref Range: 10.0 - 15.0 % 14.0   Diff Method Unknown Automated Method   % Neutrophils Latest Units: % 65.1   % Lymphocytes Latest Units: % 13.6   % Monocytes Latest Units: % 18.3   % Eosinophils Latest Units: % 1.3   % Basophils Latest Units: % 1.0   % Immature Granulocytes Latest Units: % 0.7   Absolute Neutrophil Latest Ref Range: 1.6 - 8.3 10e9/L 2.0   Absolute Lymphocytes Latest Ref Range: 0.8 - 5.3 10e9/L 0.4 (L)   Absolute Monocytes Latest Ref Range: 0.0 - 1.3 10e9/L 0.6   Absolute Eosinophils Latest Ref Range: 0.0 - 0.7 10e9/L 0.0   Absolute Basophils Latest Ref Range: 0.0 - 0.2 10e9/L 0.0   Abs Immature Granulocytes Latest Ref Range: 0 - 0.4 10e9/L 0.0      ASSESSMENT AND PLAN:   1.  History of stage IIB, T2 N1 M0 invasive lobular carcinoma of the right breast.  It was initially diagnosed in 2006 and at that time it was hormone receptor positive, HER-2 negative.  She was treated on ECOG 2104 protocol with dose-dense Adriamycin, Cytoxan and Avastin x4 cycles followed by Taxol and Avastin x4 cycles followed by a Avastin for 1 year.  She also received radiation to the right breast which she completed in January 2007 (5040 cGy).  She took Aromasin from 2007 to 2014.    Now she has metastatic breast cancer with extensive involvement of the bones.  There is also a left lower lobe hypermetabolic lesion and mediastinal lymph nodes which are hypermetabolic.  The biopsy from the right iliac bone is consistent with metastatic lobular breast cancer but it is hormone receptor and HER-2 negative and PDL 1 is also negative.    She has received 3000 cGy of palliative radiation to T12-L5 from 9/6/2019 till 9/18/2019.    She was started on palliative Xeloda but she was unable to tolerate even the dose reduced medication.        We decided on repeating scans and MRI brain on 10/25/2019  showed no intracranial metastatic disease.   Multiple enhancing calvarial lesions may be increased in number and are suggestive of metastatic disease. Thinner imaging on the current study may identify the smaller lesions and may be responsible for  identified more lesions.   There is a single focus of T2 hyperintense signal within the anterior right temporal lobe may represent interval demyelination. There is no evidence for active demyelination.    PET/CT on 11/1/2019 showed favorable response to therapy and Overall FDG uptake within scattered osseous metastases is decreased since 8/30/2019, particularly within the pelvis. Increased sclerotic appearance of L1 and L4 pathologic compression deformities, likely sequela of recently completed palliative radiation therapy.    No significant FDG uptake in previously demonstrated hypermetabolic mediastinal lymph nodes. Biopsy negative on 9/5/2019.  There is also decreased FDG uptake in the left lower lobe (biopsy negative for  malignancy), now with dense consolidation containing air bronchograms suggestive of infection versus aspiration.  There is diffuse FDG uptake within the esophagus, consistent with esophagitis.    Because of intolerance to Xeloda we decided on switching to weekly paclitaxel on 11/5/2019.    For better tolerance we decreased the dose of paclitaxel to 70 mg/m  with cycle #2.  Overall she is tolerating chemotherapy fairly well.  We will proceed with cycle #3 at the same dose.  We will repeat PET scan before cycle #4.    Nausea and vomiting.  She was not taking Compazine and Ativan as prescribed.  I strongly encouraged her to be on top of these medications first to prevent nausea and vomiting.    Diarrhea.  This is due to chemotherapy.  Take Imodium to prevent diarrhea.    Bone metastasis.  She received Zometa on 10/23/2019.  We will plan to give it every 3 months.  Continue vitamin D and calcium.    Pain management.  This is from the widely metastatic  breast cancer to the bones.  Her pain basically is in the back and in the rib cage area and around the right shoulder blade.  Continue oxycodone as needed.  Previously she got palliative radiation to T12-L5 3000 cGy in 10 fractions from 9/6/2019 till 9/18/2019.  She was evaluated by orthopedics Dr. Bipin Elliott on 11/1/2019 and conservative management was recommended.  Follow with palliative care.      Cytopenias.  Due to chemotherapy.  Continue to observe as these are mild.    Fatigue.  This is from the cancer and chemotherapy.  I again encouraged her to do regular exercise.  Continue to follow with cancer rehab.      Vision changes.  She has problems with near vision although far vision is good.  I advised her to follow-up with optometrist/ophthalmologist.    We did not address the following today.  Insomnia.  She is sleeping better on trazodone 50 mg at night and she will continue to do that.        Discussion regarding healthcare directives.  On previous visit she told me that she will bring the health care directive for our records but she forgot so I told her to bring it on next visit.      Breast implant removal.  She tells me that she was planning to do breast implant removal because there was a recall on this.  Her surgery was scheduled for 9/24/2019.  Because of this new development of metastatic breast cancer and her recent radiation, surgery has been canceled.  We discussed that at this time treatment for metastatic breast cancer would take precedence over the other surgery as the other surgery would require her to be off chemotherapy for several weeks and in that case the chances of cancer progression would be high and I would not recommend that.    Multiple sclerosis.  She will continue to follow with her neurologist Dr. Quiles.  Currently multiple sclerosis is under control and currently she is not taking any medications.      We will repeat PET scan around 1/24/2020.  I will see her back  1/28/2020.    I answered all of her questions to her satisfaction.  She is agreeable and comfortable with the plan.        Dewayne Zepeda MD      Again, thank you for allowing me to participate in the care of your patient.        Sincerely,        Dewayne Zepeda MD

## 2019-12-30 NOTE — PROGRESS NOTES
ONCOLOGY FOLLOW UP NOTE: 12/30/2019        I took the history from reviewing the previous notes that she was following with Dr. Amaya.  I have copied and updated from prior notes.    ONCOLOGY History:    1.  January 2006:  Diagnosed with Stage IIB, T2 N1 M0 invasive lobular carcinoma of the right breast.  Final pathology showed a 4.5 x 3.8 x 2.5 cm, 01/14 lymph nodes positive.  Estrogen, progesterone receptor positive, HER-2 negative.     2.  Genetics.  BRCA1 and 2 mutations not detected. 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       THERAPY TO DATE:   1.  2006:  ECOG 2104 protocol of dose-dense Adriamycin, Cytoxan and Avastin x4 cycles followed by Taxol and Avastin x4 cycles.   2.  03/2007:  Completed 1 year Avastin.   3.  11/2006-01/2007:  Radiotherapy to the right breast of 5040 cGy.   4.  [id number]:  Aromasin.  Stopped after moving to the [facility].   5.  01/2016:  Right modified radical mastectomy with latissimus dorsi flap reconstruction and a left prophylactic mastectomy with latissimus dorsi flap reconstruction.     She recently had MRI of the brain as a follow-up for multiple sclerosis and there were multiple calvarial lesions noted which was suspicious for metastatic disease.  There was no evidence of new multiple sclerosis lesions.  She had a PET scan on 8/30/2019 which showed widespread bone metastatic lesions (calvarium, spine, sacrum, pelvis, ribs most prominent at C7, T3, L1 and L4), hypermetabolic 3 cm lesion in LLL, and mediastinal/hilar LN. CEA wnl at 1.9 and C27-29 elevated to 415 (28 on 8/23/16). A CT guided biopsy of the right iliac bone was obtained on 9/4/19, pathology consistent with metastatic adenocarcinoma (breast), ER/FL negative, HER-2 negative PDL 1 < 1%. A second biopsy was take of the LLL nodule via EBUS on 9/5/19 did not show any malignancy.    C/T/L spine MRI 8/3/19 to 9/2/19 with numerous enhancing lesions of the C, T and L spine, largest around T9 and L1. No evidence  of cord compression. Has a L1 compression fracture and impending L4.     Received her radiation T12-L5 3000 cGy in 10 fractions from 9/6/2019 till 9/18/2019.  Neurosurgery recommended no surgical intervention but to wear Desdemona brace when out of bed and HOB >30 degrees    She started palliative Xeloda 2000/1500 on 10/1/2019 but after just a few doses she noticed nausea, red eyes blurred vision, loss of appetite.  She stopped taking it after 5 doses and was seen by nurse practitioner on 10/7/2019 when she was improving.  At that point she was restarted on Xeloda at a lower dose of 1000 mg twice a day.  As she was not able to tolerate even the lower dose with extreme nausea and vomiting and feeling extremely fatigued and blurry vision, we decided on stopping Xeloda.    We decided on repeating scans and MRI brain on 10/25/2019 showed no intracranial metastatic disease.   Multiple enhancing calvarial lesions may be increased in number and are suggestive of metastatic disease. Thinner imaging on the current study may identify the smaller lesions and may be responsible for  identified more lesions.   There is a single focus of T2 hyperintense signal within the anterior right temporal lobe may represent interval demyelination. There is no evidence for active demyelination.    PET/CT on 11/1/2019 showed favorable response to therapy and Overall FDG uptake within scattered osseous metastases is decreased since 8/30/2019, particularly within the pelvis. Increased sclerotic appearance of L1 and L4 pathologic compression deformities, likely sequela of recently completed palliative radiation therapy.    No significant FDG uptake in previously demonstrated hypermetabolic mediastinal lymph nodes. Biopsy negative on 9/5/2019.  There is also decreased FDG uptake in the left lower lobe (biopsy negative for  malignancy), now with dense consolidation containing air bronchograms suggestive of infection versus aspiration.  There is  diffuse FDG uptake within the esophagus, consistent with esophagitis.    Because of intolerance to Xeloda we decided on switching to weekly paclitaxel on 11/5/2019.    C#2 Taxol 12/4/19- started with 70mg/m2 dose reduction    C#3 Taxol 12/30/2019     I had also recommended starting Zometa so she got dental clearance to start with that.  She received Zometa on 10/23/2019.  We plan to give it every 3 months.    She tells me that overall she is tolerating chemotherapy well.  Days when she had nausea vomiting and diarrhea but she did not take her nausea medications as prescribed and also did not take Imodium so she ended up in the emergency room where she received IV Compazine and fluids which improved her symptoms and she is feeling much better now.  She continues to have back pain and also notices some pain around the right shoulder blade.  She is taking oxycodone on average 6 a day.  Neuropathy is mild and actually better and involves only the left little finger.  She continues to have issues with near vision although far vision is good.  She is wondering if she should see an optometrist.  Headaches have significantly improved.  Overall energy is the same.  Denies any fevers or chills.  No shortness of breath.  No new swellings.  She has baseline balance issues and heat sensitivity and weakness in the legs.      ECOG 2    ROS:  Rest of the comprehensive review of the system was essentially unremarkable.      I reviewed other history in epic as below.       PAST MEDICAL HISTORY:     1.  Breast cancer  2.  Multiple sclerosis.   3.  Depression.   4.  Migraines.   5.  Hypertension.   6.  Cholecystectomy and umbilical hernia repair.     SOCIAL HISTORY: She smoked for 5 years but quit many years ago.  Drinks alcohol socially.  She lives with her .  She is a teacher in middle school.       FAMILY HISTORY: She mentions that her mother had breast cancer at 49.  Both grandmothers had breast cancer but she is not sure of  "the age.  A couple of cousins have cancers.  Patient has 3 children who are healthy.    Current Outpatient Medications   Medication     acetaminophen (TYLENOL) 500 MG tablet     busPIRone (BUSPAR) 15 MG tablet     calcium citrate-vitamin D (CITRACAL) 315-250 MG-UNIT TABS     Cholecalciferol (VITAMIN D3 PO)     LORazepam (ATIVAN) 0.5 MG tablet     magnesium 250 MG tablet     ondansetron (ZOFRAN-ODT) 8 MG ODT tab     oxyCODONE (ROXICODONE) 5 MG tablet     polyethylene glycol (MIRALAX/GLYCOLAX) packet     prochlorperazine (COMPAZINE) 10 MG tablet     propranolol (INDERAL LA) 60 MG 24 hr capsule     senna-docusate (SENOKOT-S/PERICOLACE) 8.6-50 MG tablet     syringe/needle, disp, (B-D INTEGRA SYRINGE) 23G X 1\" 3 ML MISC     tolterodine ER (DETROL LA) 4 MG 24 hr capsule     traZODone (DESYREL) 50 MG tablet     venlafaxine (EFFEXOR-ER) 225 MG TB24 24 hr tablet     diclofenac (VOLTAREN) 1 % topical gel     erenumab-aooe (AIMOVIG 140 DOSE) 70 MG/ML injection     Lidocaine (LIDOCARE) 4 % Patch     naloxone (NARCAN) 1 mg/mL for intranasal kit (2 syringes with 2 mucosal atomizer device)     ranitidine (ZANTAC) 150 MG tablet     No current facility-administered medications for this visit.      Facility-Administered Medications Ordered in Other Visits   Medication     heparin 100 UNIT/ML injection 5 mL     sodium chloride (PF) 0.9% PF flush 10-20 mL          Allergies   Allergen Reactions     Bactrim [Sulfamethoxazole W/Trimethoprim]      Internal bleeding     Copaxone [Glatiramer] Hives          PHYSICAL EXAMINATION:   BP (!) 148/101 (BP Location: Left arm)   Pulse 74   Temp 97.9  F (36.6  C) (Oral)   Resp 16   Ht 1.575 m (5' 2.01\")   Wt 65.7 kg (144 lb 14.4 oz)   SpO2 99%   BMI 26.50 kg/m    Wt Readings from Last 4 Encounters:   12/30/19 65.7 kg (144 lb 14.4 oz)   12/24/19 62 kg (136 lb 9.6 oz)   12/16/19 67.7 kg (149 lb 4.8 oz)   12/10/19 64.4 kg (141 lb 14.4 oz)     CONSTITUTIONAL: No apparent distress  EYES: ROSA, " without pallor or jaundice  ENT/MOUTH: Ears unremarkable. No oral lesions  CVS: s1s2 normal  RESPIRATORY: Chest is clear  GI: Abdomen is benign  NEURO: Alert and oriented ×3  INTEGUMENT: no concerning skin rashes   LYMPHATIC: no palpable lymphadenopathy  MUSCULOSKELETAL: Mild tenderness in the back on palpation along the right shoulder blade.  EXTREMITIES: no pedal edema  PSYCH: Mentation, mood and affect are appropriate          Labs and imaging reviewed.    Results for KYLE GU (MRN [id number]) as of 12/30/2019 11:17   Ref. Range 12/30/2019 10:15   Sodium Latest Ref Range: 133 - 144 mmol/L 139   Potassium Latest Ref Range: 3.4 - 5.3 mmol/L 3.8   Chloride Latest Ref Range: 94 - 109 mmol/L 107   Carbon Dioxide Latest Ref Range: 20 - 32 mmol/L 28   Urea Nitrogen Latest Ref Range: 7 - 30 mg/dL 4 (L)   Creatinine Latest Ref Range: 0.52 - 1.04 mg/dL 0.51 (L)   GFR Estimate Latest Ref Range: >60 mL/min/1.73_m2 >90   GFR Estimate If Black Latest Ref Range: >60 mL/min/1.73_m2 >90   Calcium Latest Ref Range: 8.5 - 10.1 mg/dL 8.3 (L)   Anion Gap Latest Ref Range: 3 - 14 mmol/L 4   Albumin Latest Ref Range: 3.4 - 5.0 g/dL 3.0 (L)   Protein Total Latest Ref Range: 6.8 - 8.8 g/dL 6.0 (L)   Bilirubin Total Latest Ref Range: 0.2 - 1.3 mg/dL 0.3   Alkaline Phosphatase Latest Ref Range: 40 - 150 U/L 84   ALT Latest Ref Range: 0 - 50 U/L 22   AST Latest Ref Range: 0 - 45 U/L 18   Glucose Latest Ref Range: 70 - 99 mg/dL 99   WBC Latest Ref Range: 4.0 - 11.0 10e9/L 3.0 (L)   Hemoglobin Latest Ref Range: 11.7 - 15.7 g/dL 11.6 (L)   Hematocrit Latest Ref Range: 35.0 - 47.0 % 35.1   Platelet Count Latest Ref Range: 150 - 450 10e9/L 161   RBC Count Latest Ref Range: 3.8 - 5.2 10e12/L 3.83   MCV Latest Ref Range: 78 - 100 fl 92   MCH Latest Ref Range: 26.5 - 33.0 pg 30.3   MCHC Latest Ref Range: 31.5 - 36.5 g/dL 33.0   RDW Latest Ref Range: 10.0 - 15.0 % 14.0   Diff Method Unknown Automated Method   % Neutrophils Latest Units: %  65.1   % Lymphocytes Latest Units: % 13.6   % Monocytes Latest Units: % 18.3   % Eosinophils Latest Units: % 1.3   % Basophils Latest Units: % 1.0   % Immature Granulocytes Latest Units: % 0.7   Absolute Neutrophil Latest Ref Range: 1.6 - 8.3 10e9/L 2.0   Absolute Lymphocytes Latest Ref Range: 0.8 - 5.3 10e9/L 0.4 (L)   Absolute Monocytes Latest Ref Range: 0.0 - 1.3 10e9/L 0.6   Absolute Eosinophils Latest Ref Range: 0.0 - 0.7 10e9/L 0.0   Absolute Basophils Latest Ref Range: 0.0 - 0.2 10e9/L 0.0   Abs Immature Granulocytes Latest Ref Range: 0 - 0.4 10e9/L 0.0      ASSESSMENT AND PLAN:   1.  History of stage IIB, T2 N1 M0 invasive lobular carcinoma of the right breast.  It was initially diagnosed in 2006 and at that time it was hormone receptor positive, HER-2 negative.  She was treated on ECOG 2104 protocol with dose-dense Adriamycin, Cytoxan and Avastin x4 cycles followed by Taxol and Avastin x4 cycles followed by a Avastin for 1 year.  She also received radiation to the right breast which she completed in January 2007 (5040 cGy).  She took Aromasin from 2007 to 2014.    Now she has metastatic breast cancer with extensive involvement of the bones.  There is also a left lower lobe hypermetabolic lesion and mediastinal lymph nodes which are hypermetabolic.  The biopsy from the right iliac bone is consistent with metastatic lobular breast cancer but it is hormone receptor and HER-2 negative and PDL 1 is also negative.    She has received 3000 cGy of palliative radiation to T12-L5 from 9/6/2019 till 9/18/2019.    She was started on palliative Xeloda but she was unable to tolerate even the dose reduced medication.        We decided on repeating scans and MRI brain on 10/25/2019 showed no intracranial metastatic disease.   Multiple enhancing calvarial lesions may be increased in number and are suggestive of metastatic disease. Thinner imaging on the current study may identify the smaller lesions and may be responsible  for  identified more lesions.   There is a single focus of T2 hyperintense signal within the anterior right temporal lobe may represent interval demyelination. There is no evidence for active demyelination.    PET/CT on 11/1/2019 showed favorable response to therapy and Overall FDG uptake within scattered osseous metastases is decreased since 8/30/2019, particularly within the pelvis. Increased sclerotic appearance of L1 and L4 pathologic compression deformities, likely sequela of recently completed palliative radiation therapy.    No significant FDG uptake in previously demonstrated hypermetabolic mediastinal lymph nodes. Biopsy negative on 9/5/2019.  There is also decreased FDG uptake in the left lower lobe (biopsy negative for  malignancy), now with dense consolidation containing air bronchograms suggestive of infection versus aspiration.  There is diffuse FDG uptake within the esophagus, consistent with esophagitis.    Because of intolerance to Xeloda we decided on switching to weekly paclitaxel on 11/5/2019.    For better tolerance we decreased the dose of paclitaxel to 70 mg/m  with cycle #2.  Overall she is tolerating chemotherapy fairly well.  We will proceed with cycle #3 at the same dose.  We will repeat PET scan before cycle #4.    Nausea and vomiting.  She was not taking Compazine and Ativan as prescribed.  I strongly encouraged her to be on top of these medications first to prevent nausea and vomiting.    Diarrhea.  This is due to chemotherapy.  Take Imodium to prevent diarrhea.    Bone metastasis.  She received Zometa on 10/23/2019.  We will plan to give it every 3 months.  Continue vitamin D and calcium.    Pain management.  This is from the widely metastatic breast cancer to the bones.  Her pain basically is in the back and in the rib cage area and around the right shoulder blade.  Continue oxycodone as needed.  Previously she got palliative radiation to T12-L5 3000 cGy in 10 fractions from 9/6/2019  till 9/18/2019.  She was evaluated by orthopedics Dr. Bipin Elliott on 11/1/2019 and conservative management was recommended.  Follow with palliative care.      Cytopenias.  Due to chemotherapy.  Continue to observe as these are mild.    Fatigue.  This is from the cancer and chemotherapy.  I again encouraged her to do regular exercise.  Continue to follow with cancer rehab.      Vision changes.  She has problems with near vision although far vision is good.  I advised her to follow-up with optometrist/ophthalmologist.    We did not address the following today.  Insomnia.  She is sleeping better on trazodone 50 mg at night and she will continue to do that.        Discussion regarding healthcare directives.  On previous visit she told me that she will bring the health care directive for our records but she forgot so I told her to bring it on next visit.      Breast implant removal.  She tells me that she was planning to do breast implant removal because there was a recall on this.  Her surgery was scheduled for 9/24/2019.  Because of this new development of metastatic breast cancer and her recent radiation, surgery has been canceled.  We discussed that at this time treatment for metastatic breast cancer would take precedence over the other surgery as the other surgery would require her to be off chemotherapy for several weeks and in that case the chances of cancer progression would be high and I would not recommend that.    Multiple sclerosis.  She will continue to follow with her neurologist Dr. Quiles.  Currently multiple sclerosis is under control and currently she is not taking any medications.      We will repeat PET scan around 1/24/2020.  I will see her back 1/28/2020.    I answered all of her questions to her satisfaction.  She is agreeable and comfortable with the plan.        Dewayne Zepeda MD

## 2019-12-30 NOTE — PROGRESS NOTES
"Infusion Nursing Note:  Shaneka Alvarez presents today for C3,D1 of Taxol.    Patient seen by provider today: Yes: Dr Zepeda   present during visit today: Not Applicable.    Note:Pt states she is doing well, denies any problems with her last infusion, however her near sited vision has become more of a problem, \"I can read a book or watch television, I can get a headache easily if I try\". MD has requested pt go to Optometrist to get eyes checked again - pt states she will make an appointment.    Intravenous Access:  Implanted Port.    Treatment Conditions:  Lab Results   Component Value Date    HGB 11.6 12/30/2019     Lab Results   Component Value Date    WBC 3.0 12/30/2019      Lab Results   Component Value Date    ANEU 2.0 12/30/2019     Lab Results   Component Value Date     12/30/2019      Lab Results   Component Value Date     12/30/2019                   Lab Results   Component Value Date    POTASSIUM 3.8 12/30/2019           No results found for: MAG         Lab Results   Component Value Date    CR 0.51 12/30/2019                   Lab Results   Component Value Date    RAFAELA 8.3 12/30/2019                Lab Results   Component Value Date    BILITOTAL 0.3 12/30/2019           Lab Results   Component Value Date    ALBUMIN 3.0 12/30/2019                    Lab Results   Component Value Date    ALT 22 12/30/2019           Lab Results   Component Value Date    AST 18 12/30/2019       Results reviewed, labs MET treatment parameters, ok to proceed with treatment.      Post Infusion Assessment:  Patient tolerated infusion without incident.  Blood return noted pre and post infusion.  Site patent and intact, free from redness, edema or discomfort.  No evidence of extravasations.  Access discontinued per protocol.       Discharge Plan:   Return 01/07/20 for C3,D8 of Taxol.  Discharge instructions reviewed with: Patient.  Patient and/or family verbalized understanding of discharge instructions and all " questions answered.  Patient discharged in stable condition accompanied by: self.  Departure Mode: Ambulatory.    Loren Costa RN

## 2019-12-30 NOTE — TELEPHONE ENCOUNTER
Last refill: 12/04    Last office visit: 12/04 - Pain: related to bone metastases. Currently fairly well controlled with minimal oxycodone use.   - schedule tylenol 1000mg tid  - refer to PT  - she will discuss mind-body techniques with PAMELA Freire   Scheduled for follow up 1/22     Patient would like script  eprescribed    [place]  Report reviewed.

## 2019-12-30 NOTE — NURSING NOTE
"Oncology Rooming Note    December 30, 2019 10:48 AM   Shaneka Alvarez is a 59 year old female who presents for:    Chief Complaint   Patient presents with     Oncology Clinic Visit     Follow Up/Prior to Tx     Initial Vitals: BP (!) 148/101 (BP Location: Left arm)   Pulse 74   Temp 97.9  F (36.6  C) (Oral)   Resp 16   Ht 1.575 m (5' 2.01\")   Wt 65.7 kg (144 lb 14.4 oz)   SpO2 99%   BMI 26.50 kg/m   Estimated body mass index is 26.5 kg/m  as calculated from the following:    Height as of this encounter: 1.575 m (5' 2.01\").    Weight as of this encounter: 65.7 kg (144 lb 14.4 oz). Body surface area is 1.7 meters squared.  Moderate Pain (5) Comment: Data Unavailable   No LMP recorded. Patient is postmenopausal.  Allergies reviewed: Yes  Medications reviewed: Yes    Medications: Medication refills not needed today.  Pharmacy name entered into [facility]:    [id number] - [place], [place] - [address] [id number] [place], [place] - [address] [address] [facility] [address]  [facility] - [place], [place] - [address]              "

## 2019-12-30 NOTE — PROGRESS NOTES
Port implanted deep. Missed port x2. Catarina Kelly able to access port on 3rd try. Port needle left accessed for treatment. Tolerated port access and draw without complaint. Port site scrubbed with Chloraprep for 30 seconds and allowed to dry completely prior to dressing application. Accessed using sterile technique. Cook tubes drawn-Red gel/Green/Purple tubes. Double signed by patient and RN. See documentation flowsheet. Mariah Paniagua, RN, BSN, OCN

## 2020-01-06 DIAGNOSIS — C50.911 BILATERAL MALIGNANT NEOPLASM OF BREAST IN FEMALE, UNSPECIFIED ESTROGEN RECEPTOR STATUS, UNSPECIFIED SITE OF BREAST (H): ICD-10-CM

## 2020-01-06 DIAGNOSIS — C50.912 BILATERAL MALIGNANT NEOPLASM OF BREAST IN FEMALE, UNSPECIFIED ESTROGEN RECEPTOR STATUS, UNSPECIFIED SITE OF BREAST (H): ICD-10-CM

## 2020-01-06 DIAGNOSIS — C50.919 METASTATIC BREAST CANCER: ICD-10-CM

## 2020-01-06 RX ORDER — ALBUTEROL SULFATE 90 UG/1
1-2 AEROSOL, METERED RESPIRATORY (INHALATION)
Status: CANCELLED
Start: 2020-01-07

## 2020-01-06 RX ORDER — SODIUM CHLORIDE 9 MG/ML
1000 INJECTION, SOLUTION INTRAVENOUS CONTINUOUS PRN
Status: CANCELLED
Start: 2020-01-07

## 2020-01-06 RX ORDER — NALOXONE HYDROCHLORIDE 0.4 MG/ML
.1-.4 INJECTION, SOLUTION INTRAMUSCULAR; INTRAVENOUS; SUBCUTANEOUS
Status: CANCELLED | OUTPATIENT
Start: 2020-01-07

## 2020-01-06 RX ORDER — LORAZEPAM 2 MG/ML
0.5 INJECTION INTRAMUSCULAR EVERY 4 HOURS PRN
Status: CANCELLED
Start: 2020-01-07

## 2020-01-06 RX ORDER — EPINEPHRINE 0.3 MG/.3ML
0.3 INJECTION SUBCUTANEOUS EVERY 5 MIN PRN
Status: CANCELLED | OUTPATIENT
Start: 2020-01-07

## 2020-01-06 RX ORDER — EPINEPHRINE 1 MG/ML
0.3 INJECTION, SOLUTION INTRAMUSCULAR; SUBCUTANEOUS EVERY 5 MIN PRN
Status: CANCELLED | OUTPATIENT
Start: 2020-01-07

## 2020-01-06 RX ORDER — DIPHENHYDRAMINE HCL 25 MG
50 CAPSULE ORAL ONCE
Status: CANCELLED
Start: 2020-01-07

## 2020-01-06 RX ORDER — DIPHENHYDRAMINE HYDROCHLORIDE 50 MG/ML
50 INJECTION INTRAMUSCULAR; INTRAVENOUS
Status: CANCELLED
Start: 2020-01-07

## 2020-01-06 RX ORDER — MEPERIDINE HYDROCHLORIDE 25 MG/ML
25 INJECTION INTRAMUSCULAR; INTRAVENOUS; SUBCUTANEOUS EVERY 30 MIN PRN
Status: CANCELLED | OUTPATIENT
Start: 2020-01-07

## 2020-01-06 RX ORDER — ALBUTEROL SULFATE 0.83 MG/ML
2.5 SOLUTION RESPIRATORY (INHALATION)
Status: CANCELLED | OUTPATIENT
Start: 2020-01-07

## 2020-01-06 RX ORDER — METHYLPREDNISOLONE SODIUM SUCCINATE 125 MG/2ML
125 INJECTION, POWDER, LYOPHILIZED, FOR SOLUTION INTRAMUSCULAR; INTRAVENOUS
Status: CANCELLED
Start: 2020-01-07

## 2020-01-07 ENCOUNTER — INFUSION THERAPY VISIT (OUTPATIENT)
Dept: INFUSION THERAPY | Facility: CLINIC | Age: 60
End: 2020-01-07
Payer: COMMERCIAL

## 2020-01-07 ENCOUNTER — ONCOLOGY VISIT (OUTPATIENT)
Dept: ONCOLOGY | Facility: CLINIC | Age: 60
End: 2020-01-07
Payer: COMMERCIAL

## 2020-01-07 VITALS
DIASTOLIC BLOOD PRESSURE: 88 MMHG | RESPIRATION RATE: 16 BRPM | BODY MASS INDEX: 27.15 KG/M2 | WEIGHT: 148.5 LBS | HEART RATE: 72 BPM | TEMPERATURE: 97.4 F | SYSTOLIC BLOOD PRESSURE: 128 MMHG | OXYGEN SATURATION: 100 %

## 2020-01-07 DIAGNOSIS — C50.911 BILATERAL MALIGNANT NEOPLASM OF BREAST IN FEMALE, UNSPECIFIED ESTROGEN RECEPTOR STATUS, UNSPECIFIED SITE OF BREAST (H): ICD-10-CM

## 2020-01-07 DIAGNOSIS — C50.919 METASTATIC BREAST CANCER: Primary | ICD-10-CM

## 2020-01-07 DIAGNOSIS — C50.912 BILATERAL MALIGNANT NEOPLASM OF BREAST IN FEMALE, UNSPECIFIED ESTROGEN RECEPTOR STATUS, UNSPECIFIED SITE OF BREAST (H): ICD-10-CM

## 2020-01-07 LAB
BASOPHILS # BLD AUTO: 0.1 10E9/L (ref 0–0.2)
BASOPHILS NFR BLD AUTO: 1 %
DIFFERENTIAL METHOD BLD: ABNORMAL
ERYTHROCYTE [DISTWIDTH] IN BLOOD BY AUTOMATED COUNT: 14.5 % (ref 10–15)
HCT VFR BLD AUTO: 35.7 % (ref 35–47)
HGB BLD-MCNC: 11.5 G/DL (ref 11.7–15.7)
LYMPHOCYTES # BLD AUTO: 0.6 10E9/L (ref 0.8–5.3)
LYMPHOCYTES NFR BLD AUTO: 8 %
MCH RBC QN AUTO: 30.1 PG (ref 26.5–33)
MCHC RBC AUTO-ENTMCNC: 32.2 G/DL (ref 31.5–36.5)
MCV RBC AUTO: 94 FL (ref 78–100)
MONOCYTES # BLD AUTO: 0.7 10E9/L (ref 0–1.3)
MONOCYTES NFR BLD AUTO: 10 %
NEUTROPHILS # BLD AUTO: 5.7 10E9/L (ref 1.6–8.3)
NEUTROPHILS NFR BLD AUTO: 81 %
PLATELET # BLD AUTO: 194 10E9/L (ref 150–450)
RBC # BLD AUTO: 3.82 10E12/L (ref 3.8–5.2)
WBC # BLD AUTO: 7.1 10E9/L (ref 4–11)

## 2020-01-07 PROCEDURE — 99207 ZZC NO CHARGE NURSE ONLY: CPT

## 2020-01-07 PROCEDURE — 85025 COMPLETE CBC W/AUTO DIFF WBC: CPT | Performed by: INTERNAL MEDICINE

## 2020-01-07 PROCEDURE — 99207 ZZC NO CHARGE LOS: CPT

## 2020-01-07 PROCEDURE — 96413 CHEMO IV INFUSION 1 HR: CPT | Performed by: INTERNAL MEDICINE

## 2020-01-07 PROCEDURE — S0028 INJECTION, FAMOTIDINE, 20 MG: HCPCS | Performed by: INTERNAL MEDICINE

## 2020-01-07 PROCEDURE — 96367 TX/PROPH/DG ADDL SEQ IV INF: CPT | Performed by: INTERNAL MEDICINE

## 2020-01-07 PROCEDURE — 96375 TX/PRO/DX INJ NEW DRUG ADDON: CPT | Performed by: INTERNAL MEDICINE

## 2020-01-07 RX ORDER — HEPARIN SODIUM,PORCINE 10 UNIT/ML
5-10 VIAL (ML) INTRAVENOUS
Status: DISCONTINUED | OUTPATIENT
Start: 2020-01-07 | End: 2020-01-07 | Stop reason: HOSPADM

## 2020-01-07 RX ORDER — HEPARIN SODIUM (PORCINE) LOCK FLUSH IV SOLN 100 UNIT/ML 100 UNIT/ML
5 SOLUTION INTRAVENOUS
Status: DISCONTINUED | OUTPATIENT
Start: 2020-01-07 | End: 2020-01-07 | Stop reason: HOSPADM

## 2020-01-07 RX ORDER — DIPHENHYDRAMINE HCL 25 MG
50 CAPSULE ORAL ONCE
Status: COMPLETED | OUTPATIENT
Start: 2020-01-07 | End: 2020-01-07

## 2020-01-07 RX ADMIN — HEPARIN SODIUM (PORCINE) LOCK FLUSH IV SOLN 100 UNIT/ML 5 ML: 100 SOLUTION at 10:42

## 2020-01-07 RX ADMIN — Medication 50 MG: at 11:29

## 2020-01-07 RX ADMIN — Medication 250 ML: at 11:26

## 2020-01-07 RX ADMIN — HEPARIN SODIUM (PORCINE) LOCK FLUSH IV SOLN 100 UNIT/ML 5 ML: 100 SOLUTION at 13:06

## 2020-01-07 NOTE — PROGRESS NOTES
Infusion Nursing Note:  Shaneak Alvarez presents today for C3D8 Taxol.    Patient seen by provider today: No   present during visit today: Not Applicable.    Note:   Took Prednisone eye drops and taking another drop every 1-2 hours with no relief. Following up with ophthalmologist on 1/10/2020    Intravenous Access:  Implanted Port.    Treatment Conditions:  Lab Results   Component Value Date    HGB 11.5 01/07/2020     Lab Results   Component Value Date    WBC 7.1 01/07/2020      Lab Results   Component Value Date    ANEU 5.7 01/07/2020     Lab Results   Component Value Date     01/07/2020      Results reviewed, labs MET treatment parameters, ok to proceed with treatment.      Post Infusion Assessment:  Patient tolerated infusion without incident.  Blood return noted pre and post infusion.  Site patent and intact, free from redness, edema or discomfort.  No evidence of extravasations.  Access discontinued per protocol.       Discharge Plan:   atient will return 1/14/2020 for next appointment.   Patient discharged in stable condition accompanied by: self.  Departure Mode: Ambulatory.    Lauren Shaw RN

## 2020-01-08 DIAGNOSIS — C50.911 BILATERAL MALIGNANT NEOPLASM OF BREAST IN FEMALE, UNSPECIFIED ESTROGEN RECEPTOR STATUS, UNSPECIFIED SITE OF BREAST (H): ICD-10-CM

## 2020-01-08 DIAGNOSIS — C50.919 METASTATIC BREAST CANCER: ICD-10-CM

## 2020-01-08 DIAGNOSIS — C50.912 BILATERAL MALIGNANT NEOPLASM OF BREAST IN FEMALE, UNSPECIFIED ESTROGEN RECEPTOR STATUS, UNSPECIFIED SITE OF BREAST (H): ICD-10-CM

## 2020-01-08 RX ORDER — PROCHLORPERAZINE MALEATE 10 MG
10 TABLET ORAL EVERY 6 HOURS PRN
Qty: 30 TABLET | Refills: 2 | Status: SHIPPED | OUTPATIENT
Start: 2020-01-08 | End: 2020-01-28

## 2020-01-09 ENCOUNTER — HOSPITAL ENCOUNTER (OUTPATIENT)
Dept: PHYSICAL THERAPY | Facility: CLINIC | Age: 60
Setting detail: THERAPIES SERIES
End: 2020-01-09
Attending: HOSPITALIST
Payer: COMMERCIAL

## 2020-01-09 PROCEDURE — 97110 THERAPEUTIC EXERCISES: CPT | Mod: GP | Performed by: PHYSICAL THERAPIST

## 2020-01-10 NOTE — PROGRESS NOTES
"Outpatient Physical Therapy Discharge Note     Patient: Shaneka Alvarez  : 1960    Beginning/End Dates of Reporting Period:  19 to 1/10/2020    Referring Provider: Alva Whitaker MD    Therapy Diagnosis: weakness and decreased aerobic capacity.     Client Self Report: She has had energy to do things around the house-- she has felt some weakness on stairs.     Objective Measurements:  Objective Measure: sit<>stand   Details: 12 in 30 secs        Goals:  Goal Identifier 6MWT   Goal Description Shaneka will increase her 6MWT distance by 70 feet to increase her aerobic capacity in order to go to the grocery store without increased fatigue.   Target Date 20   Date Met      Progress:     Goal Identifier FGA   Goal Description Shaneka will score >24/30 on the FGA to demonstrate improved balance in order to increase her safety while ambulating in the community.   Target Date 20   Date Met      Progress:     Goal Identifier 30 second sit<>stand-- 12 reps    Goal Description Shaneka will increase her sit to stand repetitions from 9 to 13 from an 18\" chair without UE support to demonstrate increased LE strength in order to increase her ability to complete household tasks.   Target Date 20   Date Met      Progress:     Goal Identifier HEP    Goal Description Shaneka will demonstrate independence and compliance with a strengthening, balance, and endurance HEP to improve her strength and endurance in order to decrease her fatigue throughout the day.   Target Date 20   Date Met  20   Progress: MET     Progress Toward Goals:   Patient participated in three PT sessions. Focused on exercises as part of HEP in order to develop sustainable program for home. Patient feeling confident at end of PT session today and wanted to wrap up with PT in order to avoid coming in for more appointments. Did not get a chance then to re-test all of her goals but patient overall was doing well with HEP and " reports that balance has been no issue as of late. Needs to continue HEP as well as walking program in order to keep her strength and functional endurance up during CA treatments.     Plan:  Discharge from therapy.    Discharge:    Reason for Discharge: Patient has met all goals or will continue to work towards goals with continued HEP.     Equipment Issued: none    Discharge Plan: Patient to continue home program.

## 2020-01-14 ENCOUNTER — ONCOLOGY VISIT (OUTPATIENT)
Dept: ONCOLOGY | Facility: CLINIC | Age: 60
End: 2020-01-14
Payer: COMMERCIAL

## 2020-01-14 ENCOUNTER — INFUSION THERAPY VISIT (OUTPATIENT)
Dept: INFUSION THERAPY | Facility: CLINIC | Age: 60
End: 2020-01-14
Payer: COMMERCIAL

## 2020-01-14 VITALS
RESPIRATION RATE: 16 BRPM | HEART RATE: 64 BPM | DIASTOLIC BLOOD PRESSURE: 69 MMHG | WEIGHT: 143.9 LBS | OXYGEN SATURATION: 97 % | BODY MASS INDEX: 26.31 KG/M2 | TEMPERATURE: 98.5 F | SYSTOLIC BLOOD PRESSURE: 96 MMHG

## 2020-01-14 DIAGNOSIS — C79.51 BONE METASTASES: ICD-10-CM

## 2020-01-14 DIAGNOSIS — C50.912 BILATERAL MALIGNANT NEOPLASM OF BREAST IN FEMALE, UNSPECIFIED ESTROGEN RECEPTOR STATUS, UNSPECIFIED SITE OF BREAST (H): ICD-10-CM

## 2020-01-14 DIAGNOSIS — C50.911 BILATERAL MALIGNANT NEOPLASM OF BREAST IN FEMALE, UNSPECIFIED ESTROGEN RECEPTOR STATUS, UNSPECIFIED SITE OF BREAST (H): ICD-10-CM

## 2020-01-14 DIAGNOSIS — C50.919 METASTATIC BREAST CANCER: Primary | ICD-10-CM

## 2020-01-14 DIAGNOSIS — C50.919 METASTATIC BREAST CANCER: ICD-10-CM

## 2020-01-14 LAB
BASOPHILS # BLD AUTO: 0 10E9/L (ref 0–0.2)
BASOPHILS NFR BLD AUTO: 1.2 %
DIFFERENTIAL METHOD BLD: ABNORMAL
EOSINOPHIL # BLD AUTO: 0.1 10E9/L (ref 0–0.7)
EOSINOPHIL NFR BLD AUTO: 3.7 %
ERYTHROCYTE [DISTWIDTH] IN BLOOD BY AUTOMATED COUNT: 14.4 % (ref 10–15)
HCT VFR BLD AUTO: 38.9 % (ref 35–47)
HGB BLD-MCNC: 12.8 G/DL (ref 11.7–15.7)
IMM GRANULOCYTES # BLD: 0 10E9/L (ref 0–0.4)
IMM GRANULOCYTES NFR BLD: 0.9 %
LYMPHOCYTES # BLD AUTO: 0.6 10E9/L (ref 0.8–5.3)
LYMPHOCYTES NFR BLD AUTO: 18.1 %
MCH RBC QN AUTO: 30.1 PG (ref 26.5–33)
MCHC RBC AUTO-ENTMCNC: 32.9 G/DL (ref 31.5–36.5)
MCV RBC AUTO: 92 FL (ref 78–100)
MONOCYTES # BLD AUTO: 0.3 10E9/L (ref 0–1.3)
MONOCYTES NFR BLD AUTO: 10 %
NEUTROPHILS # BLD AUTO: 2.1 10E9/L (ref 1.6–8.3)
NEUTROPHILS NFR BLD AUTO: 66.1 %
PLATELET # BLD AUTO: 182 10E9/L (ref 150–450)
RBC # BLD AUTO: 4.25 10E12/L (ref 3.8–5.2)
WBC # BLD AUTO: 3.2 10E9/L (ref 4–11)

## 2020-01-14 PROCEDURE — 99207 ZZC NO CHARGE LOS: CPT

## 2020-01-14 PROCEDURE — 99207 ZZC NO CHARGE NURSE ONLY: CPT

## 2020-01-14 PROCEDURE — S0028 INJECTION, FAMOTIDINE, 20 MG: HCPCS | Performed by: INTERNAL MEDICINE

## 2020-01-14 PROCEDURE — 85025 COMPLETE CBC W/AUTO DIFF WBC: CPT | Performed by: INTERNAL MEDICINE

## 2020-01-14 PROCEDURE — 96413 CHEMO IV INFUSION 1 HR: CPT | Performed by: INTERNAL MEDICINE

## 2020-01-14 PROCEDURE — 96375 TX/PRO/DX INJ NEW DRUG ADDON: CPT | Performed by: INTERNAL MEDICINE

## 2020-01-14 PROCEDURE — 96367 TX/PROPH/DG ADDL SEQ IV INF: CPT | Performed by: INTERNAL MEDICINE

## 2020-01-14 RX ORDER — HEPARIN SODIUM (PORCINE) LOCK FLUSH IV SOLN 100 UNIT/ML 100 UNIT/ML
5 SOLUTION INTRAVENOUS
Status: DISCONTINUED | OUTPATIENT
Start: 2020-01-14 | End: 2020-01-14 | Stop reason: HOSPADM

## 2020-01-14 RX ORDER — HEPARIN SODIUM (PORCINE) LOCK FLUSH IV SOLN 100 UNIT/ML 100 UNIT/ML
5 SOLUTION INTRAVENOUS
Status: DISCONTINUED | OUTPATIENT
Start: 2020-01-14 | End: 2020-01-15 | Stop reason: HOSPADM

## 2020-01-14 RX ORDER — EPINEPHRINE 0.3 MG/.3ML
0.3 INJECTION SUBCUTANEOUS EVERY 5 MIN PRN
Status: CANCELLED | OUTPATIENT
Start: 2020-01-14

## 2020-01-14 RX ORDER — LORAZEPAM 2 MG/ML
0.5 INJECTION INTRAMUSCULAR EVERY 4 HOURS PRN
Status: CANCELLED
Start: 2020-01-14

## 2020-01-14 RX ORDER — SODIUM CHLORIDE 9 MG/ML
1000 INJECTION, SOLUTION INTRAVENOUS CONTINUOUS PRN
Status: CANCELLED
Start: 2020-01-14

## 2020-01-14 RX ORDER — METHYLPREDNISOLONE SODIUM SUCCINATE 125 MG/2ML
125 INJECTION, POWDER, LYOPHILIZED, FOR SOLUTION INTRAMUSCULAR; INTRAVENOUS
Status: CANCELLED
Start: 2020-01-14

## 2020-01-14 RX ORDER — DIPHENHYDRAMINE HCL 25 MG
50 CAPSULE ORAL ONCE
Status: DISCONTINUED | OUTPATIENT
Start: 2020-01-14 | End: 2020-01-15 | Stop reason: HOSPADM

## 2020-01-14 RX ORDER — DIPHENHYDRAMINE HYDROCHLORIDE 50 MG/ML
50 INJECTION INTRAMUSCULAR; INTRAVENOUS
Status: CANCELLED
Start: 2020-01-14

## 2020-01-14 RX ORDER — ALBUTEROL SULFATE 0.83 MG/ML
2.5 SOLUTION RESPIRATORY (INHALATION)
Status: CANCELLED | OUTPATIENT
Start: 2020-01-14

## 2020-01-14 RX ORDER — ALBUTEROL SULFATE 90 UG/1
1-2 AEROSOL, METERED RESPIRATORY (INHALATION)
Status: CANCELLED
Start: 2020-01-14

## 2020-01-14 RX ORDER — ZOLEDRONIC ACID 0.04 MG/ML
4 INJECTION, SOLUTION INTRAVENOUS ONCE
Status: COMPLETED | OUTPATIENT
Start: 2020-01-14 | End: 2020-01-14

## 2020-01-14 RX ORDER — MEPERIDINE HYDROCHLORIDE 25 MG/ML
25 INJECTION INTRAMUSCULAR; INTRAVENOUS; SUBCUTANEOUS EVERY 30 MIN PRN
Status: CANCELLED | OUTPATIENT
Start: 2020-01-14

## 2020-01-14 RX ORDER — NALOXONE HYDROCHLORIDE 0.4 MG/ML
.1-.4 INJECTION, SOLUTION INTRAMUSCULAR; INTRAVENOUS; SUBCUTANEOUS
Status: CANCELLED | OUTPATIENT
Start: 2020-01-14

## 2020-01-14 RX ORDER — EPINEPHRINE 1 MG/ML
0.3 INJECTION, SOLUTION INTRAMUSCULAR; SUBCUTANEOUS EVERY 5 MIN PRN
Status: CANCELLED | OUTPATIENT
Start: 2020-01-14

## 2020-01-14 RX ORDER — DIPHENHYDRAMINE HCL 25 MG
50 CAPSULE ORAL ONCE
Status: CANCELLED
Start: 2020-01-14

## 2020-01-14 RX ORDER — ZOLEDRONIC ACID 0.04 MG/ML
4 INJECTION, SOLUTION INTRAVENOUS ONCE
Status: CANCELLED | OUTPATIENT
Start: 2020-01-14

## 2020-01-14 RX ADMIN — HEPARIN SODIUM (PORCINE) LOCK FLUSH IV SOLN 100 UNIT/ML 5 ML: 100 SOLUTION at 15:34

## 2020-01-14 RX ADMIN — ZOLEDRONIC ACID 4 MG: 0.04 INJECTION, SOLUTION INTRAVENOUS at 13:12

## 2020-01-14 RX ADMIN — Medication 250 ML: at 13:12

## 2020-01-14 RX ADMIN — HEPARIN SODIUM (PORCINE) LOCK FLUSH IV SOLN 100 UNIT/ML 5 ML: 100 SOLUTION at 12:27

## 2020-01-14 ASSESSMENT — PAIN SCALES - GENERAL: PAINLEVEL: NO PAIN (0)

## 2020-01-14 NOTE — PROGRESS NOTES
SPIRITUAL HEALTH SERVICES Progress Note  [facility]    Visited Shaneka Alvarez for ongoing emotional/spiritual support after a telephone conversation in December.   I wanted to check in with her as I had not been able to reach out to her  as she requested.       Illness Narrative - Patient has been tolerating the chemotherapy medication and learning how to manage the side effects.        Distress - Patient is still concerned that her  does not always talk about how he is feeling so she doesn't know how this diagnosis is effecting him and if he needs support.       Coping - She states that her  took some time off around the holidays and they were able to have some quality time together.   Patient has been a  all her life and has enjoyed working with children.  She and her  would like to go to [place] over spring break to see their daughter and granddaughters.  Prayed with her for her concerns and requests today.       Meaning-Making - Maintaining her current quality of life.       Plan - I will call her  and make sure that he is aware of support services here at [facility]. I will also checked in with her periodically during her treatments for Sh support.     Gina Pineda  Staff   [phone]    Addendum: Left a message on Shaneka's  (Kash)'s cell phone with support services contact numbers if he would like to reach out for support as needed.  I let him know of my availability for telephone conversations.   Gina Pineda  Staff

## 2020-01-15 NOTE — PROGRESS NOTES
Infusion Nursing Note:  Shaneka Alvarez presents today for Taxol/Zometa.    Patient seen by provider today: no   present during visit today: Not Applicable.    Note: patient having intermittent nausea.  She intends to be more proactive with her antiemetics, as sometimes she has vomiting soon after nausea sets in, not allowing her time to take prochlorperazine (zofran produced side effects).      Intravenous Access:  Implanted Port.    Treatment Conditions:  Results reviewed, labs MET treatment parameters, ok to proceed with treatment.      Post Infusion Assessment:  Patient tolerated infusion without incident.       Discharge Plan:   RTC 1/28/20 for cycle 4 with provider 1st.    PROSPER MCCRACKEN RN

## 2020-01-21 ENCOUNTER — PATIENT OUTREACH (OUTPATIENT)
Dept: ONCOLOGY | Facility: CLINIC | Age: 60
End: 2020-01-21

## 2020-01-21 NOTE — TELEPHONE ENCOUNTER
Returned call to patient to inquire further regarding message patient left on writer's voicemail:  That she has detached retinas and she believes it is from the first chemotherapy she received with Capecitabine.  Patient is seeing a retina surgeon tomorrow for consultation.  Patient states she first noted irritation in her eyes and redness the day after she started Capecitabine.  Her eyes became very red with blurry vision; she also experienced nausea and vomiting at that time and dose was lowered and then eventually on 10/23/19 was stopped due to side effects of nausea/vomiting and blurred vision.   Patient has follow-up scheduled with Dr. Zepeda on 1/28; advised will pass information on to Dr. Zepeda.

## 2020-01-22 ENCOUNTER — TRANSFERRED RECORDS (OUTPATIENT)
Dept: HEALTH INFORMATION MANAGEMENT | Facility: CLINIC | Age: 60
End: 2020-01-22

## 2020-01-24 ENCOUNTER — ANCILLARY PROCEDURE (OUTPATIENT)
Dept: PET IMAGING | Facility: CLINIC | Age: 60
End: 2020-01-24
Attending: INTERNAL MEDICINE
Payer: COMMERCIAL

## 2020-01-24 DIAGNOSIS — C50.919 METASTATIC BREAST CANCER: ICD-10-CM

## 2020-01-24 DIAGNOSIS — G47.00 INSOMNIA, UNSPECIFIED TYPE: ICD-10-CM

## 2020-01-24 DIAGNOSIS — C79.51 BONE METASTASES: ICD-10-CM

## 2020-01-24 PROCEDURE — 78816 PET IMAGE W/CT FULL BODY: CPT | Mod: PS | Performed by: RADIOLOGY

## 2020-01-24 PROCEDURE — A9552 F18 FDG: HCPCS | Performed by: INTERNAL MEDICINE

## 2020-01-24 PROCEDURE — 74177 CT ABD & PELVIS W/CONTRAST: CPT | Mod: 59

## 2020-01-24 PROCEDURE — 71260 CT THORAX DX C+: CPT | Mod: 59

## 2020-01-24 RX ORDER — TRAZODONE HYDROCHLORIDE 50 MG/1
50 TABLET, FILM COATED ORAL AT BEDTIME
Qty: 30 TABLET | Refills: 0 | Status: SHIPPED | OUTPATIENT
Start: 2020-01-24 | End: 2020-02-26

## 2020-01-24 RX ORDER — IOPAMIDOL 755 MG/ML
85 INJECTION, SOLUTION INTRAVASCULAR ONCE
Status: COMPLETED | OUTPATIENT
Start: 2020-01-24 | End: 2020-01-24

## 2020-01-24 RX ADMIN — IOPAMIDOL 85 ML: 755 INJECTION, SOLUTION INTRAVASCULAR at 10:19

## 2020-01-28 ENCOUNTER — ONCOLOGY VISIT (OUTPATIENT)
Dept: ONCOLOGY | Facility: CLINIC | Age: 60
End: 2020-01-28
Payer: COMMERCIAL

## 2020-01-28 ENCOUNTER — PATIENT OUTREACH (OUTPATIENT)
Dept: ONCOLOGY | Facility: CLINIC | Age: 60
End: 2020-01-28

## 2020-01-28 ENCOUNTER — INFUSION THERAPY VISIT (OUTPATIENT)
Dept: INFUSION THERAPY | Facility: CLINIC | Age: 60
End: 2020-01-28
Payer: COMMERCIAL

## 2020-01-28 VITALS
SYSTOLIC BLOOD PRESSURE: 158 MMHG | HEART RATE: 80 BPM | RESPIRATION RATE: 16 BRPM | OXYGEN SATURATION: 97 % | HEIGHT: 62 IN | BODY MASS INDEX: 27.46 KG/M2 | WEIGHT: 149.2 LBS | TEMPERATURE: 97.9 F | DIASTOLIC BLOOD PRESSURE: 116 MMHG

## 2020-01-28 DIAGNOSIS — C50.919 CARCINOMA OF BREAST METASTATIC TO BONE, UNSPECIFIED LATERALITY (H): ICD-10-CM

## 2020-01-28 DIAGNOSIS — C79.51 CARCINOMA OF BREAST METASTATIC TO BONE, UNSPECIFIED LATERALITY (H): ICD-10-CM

## 2020-01-28 DIAGNOSIS — C50.912 BILATERAL MALIGNANT NEOPLASM OF BREAST IN FEMALE, UNSPECIFIED ESTROGEN RECEPTOR STATUS, UNSPECIFIED SITE OF BREAST (H): ICD-10-CM

## 2020-01-28 DIAGNOSIS — C50.911 BILATERAL MALIGNANT NEOPLASM OF BREAST IN FEMALE, UNSPECIFIED ESTROGEN RECEPTOR STATUS, UNSPECIFIED SITE OF BREAST (H): ICD-10-CM

## 2020-01-28 DIAGNOSIS — R20.2 NUMBNESS AND TINGLING IN LEFT ARM: Primary | ICD-10-CM

## 2020-01-28 DIAGNOSIS — C50.919 METASTATIC BREAST CANCER: Primary | ICD-10-CM

## 2020-01-28 DIAGNOSIS — R20.0 NUMBNESS AND TINGLING IN LEFT ARM: Primary | ICD-10-CM

## 2020-01-28 DIAGNOSIS — C50.919 METASTATIC BREAST CANCER: ICD-10-CM

## 2020-01-28 LAB
ALBUMIN SERPL-MCNC: 3.2 G/DL (ref 3.4–5)
ALP SERPL-CCNC: 83 U/L (ref 40–150)
ALT SERPL W P-5'-P-CCNC: 18 U/L (ref 0–50)
ANION GAP SERPL CALCULATED.3IONS-SCNC: 2 MMOL/L (ref 3–14)
AST SERPL W P-5'-P-CCNC: 17 U/L (ref 0–45)
BASOPHILS # BLD AUTO: 0 10E9/L (ref 0–0.2)
BASOPHILS NFR BLD AUTO: 1.1 %
BILIRUB SERPL-MCNC: 0.3 MG/DL (ref 0.2–1.3)
BUN SERPL-MCNC: 5 MG/DL (ref 7–30)
CALCIUM SERPL-MCNC: 9.1 MG/DL (ref 8.5–10.1)
CANCER AG27-29 SERPL-ACNC: 257 U/ML (ref 0–39)
CHLORIDE SERPL-SCNC: 105 MMOL/L (ref 94–109)
CO2 SERPL-SCNC: 29 MMOL/L (ref 20–32)
CREAT SERPL-MCNC: 0.56 MG/DL (ref 0.52–1.04)
DIFFERENTIAL METHOD BLD: ABNORMAL
EOSINOPHIL # BLD AUTO: 0.1 10E9/L (ref 0–0.7)
EOSINOPHIL NFR BLD AUTO: 2.2 %
ERYTHROCYTE [DISTWIDTH] IN BLOOD BY AUTOMATED COUNT: 13.9 % (ref 10–15)
GFR SERPL CREATININE-BSD FRML MDRD: >90 ML/MIN/{1.73_M2}
GLUCOSE SERPL-MCNC: 106 MG/DL (ref 70–99)
HCT VFR BLD AUTO: 37 % (ref 35–47)
HGB BLD-MCNC: 11.9 G/DL (ref 11.7–15.7)
IMM GRANULOCYTES # BLD: 0 10E9/L (ref 0–0.4)
IMM GRANULOCYTES NFR BLD: 0.7 %
LYMPHOCYTES # BLD AUTO: 0.4 10E9/L (ref 0.8–5.3)
LYMPHOCYTES NFR BLD AUTO: 13.3 %
MCH RBC QN AUTO: 29.6 PG (ref 26.5–33)
MCHC RBC AUTO-ENTMCNC: 32.2 G/DL (ref 31.5–36.5)
MCV RBC AUTO: 92 FL (ref 78–100)
MONOCYTES # BLD AUTO: 0.5 10E9/L (ref 0–1.3)
MONOCYTES NFR BLD AUTO: 16.9 %
NEUTROPHILS # BLD AUTO: 1.8 10E9/L (ref 1.6–8.3)
NEUTROPHILS NFR BLD AUTO: 65.8 %
PLATELET # BLD AUTO: 167 10E9/L (ref 150–450)
POTASSIUM SERPL-SCNC: 4.2 MMOL/L (ref 3.4–5.3)
PROT SERPL-MCNC: 6.3 G/DL (ref 6.8–8.8)
RBC # BLD AUTO: 4.02 10E12/L (ref 3.8–5.2)
SODIUM SERPL-SCNC: 136 MMOL/L (ref 133–144)
WBC # BLD AUTO: 2.8 10E9/L (ref 4–11)

## 2020-01-28 PROCEDURE — 86300 IMMUNOASSAY TUMOR CA 15-3: CPT | Performed by: INTERNAL MEDICINE

## 2020-01-28 PROCEDURE — 99207 ZZC NO CHARGE NURSE ONLY: CPT

## 2020-01-28 PROCEDURE — 80053 COMPREHEN METABOLIC PANEL: CPT | Performed by: INTERNAL MEDICINE

## 2020-01-28 PROCEDURE — 99207 ZZC NO CHARGE LOS: CPT

## 2020-01-28 PROCEDURE — 96367 TX/PROPH/DG ADDL SEQ IV INF: CPT | Performed by: INTERNAL MEDICINE

## 2020-01-28 PROCEDURE — 99214 OFFICE O/P EST MOD 30 MIN: CPT | Mod: 25 | Performed by: INTERNAL MEDICINE

## 2020-01-28 PROCEDURE — 85025 COMPLETE CBC W/AUTO DIFF WBC: CPT | Performed by: INTERNAL MEDICINE

## 2020-01-28 PROCEDURE — 96409 CHEMO IV PUSH SNGL DRUG: CPT | Performed by: INTERNAL MEDICINE

## 2020-01-28 RX ORDER — MEPERIDINE HYDROCHLORIDE 25 MG/ML
25 INJECTION INTRAMUSCULAR; INTRAVENOUS; SUBCUTANEOUS EVERY 30 MIN PRN
Status: CANCELLED | OUTPATIENT
Start: 2020-01-28

## 2020-01-28 RX ORDER — HEPARIN SODIUM (PORCINE) LOCK FLUSH IV SOLN 100 UNIT/ML 100 UNIT/ML
5 SOLUTION INTRAVENOUS
Status: DISCONTINUED | OUTPATIENT
Start: 2020-01-28 | End: 2020-01-28 | Stop reason: HOSPADM

## 2020-01-28 RX ORDER — DIPHENHYDRAMINE HYDROCHLORIDE 50 MG/ML
50 INJECTION INTRAMUSCULAR; INTRAVENOUS
Status: CANCELLED
Start: 2020-01-28

## 2020-01-28 RX ORDER — ALBUTEROL SULFATE 90 UG/1
1-2 AEROSOL, METERED RESPIRATORY (INHALATION)
Status: CANCELLED
Start: 2020-01-28

## 2020-01-28 RX ORDER — HEPARIN SODIUM,PORCINE 10 UNIT/ML
5 VIAL (ML) INTRAVENOUS
Status: CANCELLED | OUTPATIENT
Start: 2020-01-28

## 2020-01-28 RX ORDER — EPINEPHRINE 1 MG/ML
0.3 INJECTION, SOLUTION INTRAMUSCULAR; SUBCUTANEOUS EVERY 5 MIN PRN
Status: CANCELLED | OUTPATIENT
Start: 2020-01-28

## 2020-01-28 RX ORDER — EPINEPHRINE 0.3 MG/.3ML
0.3 INJECTION SUBCUTANEOUS EVERY 5 MIN PRN
Status: CANCELLED | OUTPATIENT
Start: 2020-01-28

## 2020-01-28 RX ORDER — HEPARIN SODIUM (PORCINE) LOCK FLUSH IV SOLN 100 UNIT/ML 100 UNIT/ML
5 SOLUTION INTRAVENOUS
Status: CANCELLED | OUTPATIENT
Start: 2020-01-28

## 2020-01-28 RX ORDER — PROCHLORPERAZINE MALEATE 10 MG
10 TABLET ORAL EVERY 6 HOURS PRN
Qty: 30 TABLET | Refills: 2 | Status: SHIPPED | OUTPATIENT
Start: 2020-01-28 | End: 2020-11-30

## 2020-01-28 RX ORDER — LORAZEPAM 2 MG/ML
0.5 INJECTION INTRAMUSCULAR EVERY 4 HOURS PRN
Status: CANCELLED
Start: 2020-01-28

## 2020-01-28 RX ORDER — ALBUTEROL SULFATE 0.83 MG/ML
2.5 SOLUTION RESPIRATORY (INHALATION)
Status: CANCELLED | OUTPATIENT
Start: 2020-01-28

## 2020-01-28 RX ORDER — SODIUM CHLORIDE 9 MG/ML
1000 INJECTION, SOLUTION INTRAVENOUS CONTINUOUS PRN
Status: CANCELLED
Start: 2020-01-28

## 2020-01-28 RX ORDER — LORAZEPAM 0.5 MG/1
0.5 TABLET ORAL EVERY 4 HOURS PRN
Qty: 30 TABLET | Refills: 2 | Status: SHIPPED | OUTPATIENT
Start: 2020-01-28 | End: 2020-05-06

## 2020-01-28 RX ORDER — METHYLPREDNISOLONE SODIUM SUCCINATE 125 MG/2ML
125 INJECTION, POWDER, LYOPHILIZED, FOR SOLUTION INTRAMUSCULAR; INTRAVENOUS
Status: CANCELLED
Start: 2020-01-28

## 2020-01-28 RX ORDER — NALOXONE HYDROCHLORIDE 0.4 MG/ML
.1-.4 INJECTION, SOLUTION INTRAMUSCULAR; INTRAVENOUS; SUBCUTANEOUS
Status: CANCELLED | OUTPATIENT
Start: 2020-01-28

## 2020-01-28 RX ADMIN — HEPARIN SODIUM (PORCINE) LOCK FLUSH IV SOLN 100 UNIT/ML 5 ML: 100 SOLUTION at 10:38

## 2020-01-28 RX ADMIN — HEPARIN SODIUM (PORCINE) LOCK FLUSH IV SOLN 100 UNIT/ML 5 ML: 100 SOLUTION at 08:11

## 2020-01-28 RX ADMIN — Medication 250 ML: at 09:59

## 2020-01-28 ASSESSMENT — MIFFLIN-ST. JEOR: SCORE: 1215.15

## 2020-01-28 ASSESSMENT — PAIN SCALES - GENERAL: PAINLEVEL: NO PAIN (0)

## 2020-01-28 NOTE — TELEPHONE ENCOUNTER
Met with patient after clinic visit/consultation appointment with Dr. Zepeda.  Patient has been having trouble with her Taxol in that she developed problems with her vision, - blurred vision and she has been seen by an Opthalmologist, who believes it could be related to Taxol.  Dr. Zepeda discontinued Taxol and she will start Eributin.    Provided patient with printouts on Eributn.   Reviewed administration, side effects and ongoing symptom management   Discussed that chemotherapy may be delayed due to blood counts, infusion schedule or patient's need to modify.  Reviewed most concerning symptoms that warrant and immediate call to the clinic nurse line and patient is familiar with these phone numbers, having received chemotherapy recently and in the past.  All questions answered.  Patient was advised to stop at scheduling before she leaves to schedule next chemotherapy and cervical MRI.      She is feeling well today but concerned about her blurred vision.  Her Opthalmalogist is hoping that her symptoms will reverse once she is off the Taxol.

## 2020-01-28 NOTE — PROGRESS NOTES
ONCOLOGY FOLLOW UP NOTE: 1/28/2020        I took the history from reviewing the previous notes that she was following with Dr. Amaya.  I have copied and updated from prior notes.    ONCOLOGY History:    1.  January 2006:  Diagnosed with Stage IIB, T2 N1 M0 invasive lobular carcinoma of the right breast.  Final pathology showed a 4.5 x 3.8 x 2.5 cm, 01/14 lymph nodes positive.  Estrogen, progesterone receptor positive, HER-2 negative.     2.  Genetics.  BRCA1 and 2 mutations not detected. 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       THERAPY TO DATE:   1. 2006:  ECOG 2104 protocol of dose-dense Adriamycin, Cytoxan and Avastin x4 cycles followed by Taxol and Avastin x4 cycles.   2.  03/2007:  Completed 1 year Avastin.   3.  11/2006-01/2007:  Radiotherapy to the right breast of 5040 cGy.   4.  03/[id number]:  Aromasin.  Stopped after moving to the [facility].   5.  01/2016:  Right modified radical mastectomy with latissimus dorsi flap reconstruction and a left prophylactic mastectomy with latissimus dorsi flap reconstruction.     She recently had MRI of the brain as a follow-up for multiple sclerosis and there were multiple calvarial lesions noted which was suspicious for metastatic disease.  There was no evidence of new multiple sclerosis lesions.  She had a PET scan on 8/30/2019 which showed widespread bone metastatic lesions (calvarium, spine, sacrum, pelvis, ribs most prominent at C7, T3, L1 and L4), hypermetabolic 3 cm lesion in LLL, and mediastinal/hilar LN. CEA wnl at 1.9 and C27-29 elevated to 415 (28 on 8/23/16). A CT guided biopsy of the right iliac bone was obtained on 9/4/19, pathology consistent with metastatic adenocarcinoma (breast), ER/FL negative, HER-2 negative PDL 1 < 1%. A second biopsy was take of the LLL nodule via EBUS on 9/5/19 did not show any malignancy.    C/T/L spine MRI 8/3/19 to 9/2/19 with numerous enhancing lesions of the C, T and L spine, largest around T9 and L1. No evidence  of cord compression. Has a L1 compression fracture and impending L4.     Received her radiation T12-L5 3000 cGy in 10 fractions from 9/6/2019 till 9/18/2019.  Neurosurgery recommended no surgical intervention but to wear [place] brace when out of bed and HOB >30 degrees    She started palliative Xeloda 2000/1500 on 10/1/2019 but after just a few doses she noticed nausea, red eyes blurred vision, loss of appetite.  She stopped taking it after 5 doses and was seen by nurse practitioner on 10/7/2019 when she was improving.  At that point she was restarted on Xeloda at a lower dose of 1000 mg twice a day.  As she was not able to tolerate even the lower dose with extreme nausea and vomiting and feeling extremely fatigued and blurry vision, we decided on stopping Xeloda.    We decided on repeating scans and MRI brain on 10/25/2019 showed no intracranial metastatic disease.   Multiple enhancing calvarial lesions may be increased in number and are suggestive of metastatic disease. Thinner imaging on the current study may identify the smaller lesions and may be responsible for  identified more lesions.   There is a single focus of T2 hyperintense signal within the anterior right temporal lobe may represent interval demyelination. There is no evidence for active demyelination.    PET/CT on 11/1/2019 showed favorable response to therapy and Overall FDG uptake within scattered osseous metastases is decreased since 8/30/2019, particularly within the pelvis. Increased sclerotic appearance of L1 and L4 pathologic compression deformities, likely sequela of recently completed palliative radiation therapy.    No significant FDG uptake in previously demonstrated hypermetabolic mediastinal lymph nodes. Biopsy negative on 9/5/2019.  There is also decreased FDG uptake in the left lower lobe (biopsy negative for  malignancy), now with dense consolidation containing air bronchograms suggestive of infection versus aspiration.  There is  diffuse FDG uptake within the esophagus, consistent with esophagitis.    Because of intolerance to Xeloda we decided on switching to weekly paclitaxel on 11/5/2019.    C#2 Taxol 12/4/19- started with 70mg/m2 dose reduction    C#3 Taxol 12/30/2019     I had also recommended starting Zometa so she got dental clearance to start with that.  She received Zometa on 10/23/2019 and 1/14/2020.  We plan to give it every 3 months.    She comes in today accompanied by her daughter.  Overall she has been doing fairly well.  She has off-and-on low back pain for which she takes oxycodone on average twice daily.  She continues to take calcium and vitamin D.  She is frustrated because of her vision changes and it is difficult for her to eat properly.  She was diagnosed with bilateral retinal detachment and she is going to see a retina specialist next week as well.  Denies any significant headaches or other new neurological problems apart from positional tingling and numbness in the left ring finger and left little finger.  It is not all the time and it is positional.  No neck pain.  Denies any significant diarrhea and she takes Imodium only occasionally.  She has off-and-on nausea and vomiting and thinks that Compazine and lorazepam are helping.  No new swellings.  No infections.  She has good and bad days in terms of energy.  Overall she is able to do light household chores.  She has baseline balance issues and heat sensitivity and weakness in the legs from multiple sclerosis.        ECOG 1-2    ROS:  A comprehensive ROS was otherwise neg      I reviewed other history in epic as below.       PAST MEDICAL HISTORY:     1.  Breast cancer  2.  Multiple sclerosis.   3.  Depression.   4.  Migraines.   5.  Hypertension.   6.  Cholecystectomy and umbilical hernia repair.     SOCIAL HISTORY: She smoked for 5 years but quit many years ago.  Drinks alcohol socially.  She lives with her .  She is a teacher in middle school.       FAMILY  "HISTORY: She mentions that her mother had breast cancer at 49.  Both grandmothers had breast cancer but she is not sure of the age.  A couple of cousins have cancers.  Patient has 3 children who are healthy.    Current Outpatient Medications   Medication     busPIRone (BUSPAR) 15 MG tablet     calcium citrate-vitamin D (CITRACAL) 315-250 MG-UNIT TABS     Cholecalciferol (VITAMIN D3 PO)     LORazepam (ATIVAN) 0.5 MG tablet     magnesium 250 MG tablet     oxyCODONE (ROXICODONE) 5 MG tablet     prochlorperazine (COMPAZINE) 10 MG tablet     propranolol (INDERAL LA) 60 MG 24 hr capsule     traZODone (DESYREL) 50 MG tablet     venlafaxine (EFFEXOR-ER) 225 MG TB24 24 hr tablet     acetaminophen (TYLENOL) 500 MG tablet     diclofenac (VOLTAREN) 1 % topical gel     naloxone (NARCAN) 1 mg/mL for intranasal kit (2 syringes with 2 mucosal atomizer device)     ondansetron (ZOFRAN-ODT) 8 MG ODT tab     polyethylene glycol (MIRALAX/GLYCOLAX) packet     senna-docusate (SENOKOT-S/PERICOLACE) 8.6-50 MG tablet     syringe/needle, disp, (B-D INTE[employer]RA SYRINGE) 23G X 1\" 3 ML MISC     No current facility-administered medications for this visit.      Facility-Administered Medications Ordered in Other Visits   Medication     heparin 100 UNIT/ML injection 5 mL     sodium chloride (PF) 0.9% PF flush 10-20 mL          Allergies   Allergen Reactions     Bactrim [Sulfamethoxazole W/Trimethoprim]      Internal bleeding     Copaxone [Glatiramer] Hives          PHYSICAL EXAMINATION:   BP (!) 158/116 (BP Location: Left arm)   Pulse 80   Temp 97.9  F (36.6  C) (Oral)   Resp 16   Ht 1.575 m (5' 2.01\")   Wt 67.7 kg (149 lb 3.2 oz)   SpO2 97%   BMI 27.28 kg/m    Wt Readings from Last 4 Encounters:   01/28/20 67.7 kg (149 lb 3.2 oz)   01/14/20 65.3 kg (143 lb 14.4 oz)   01/07/20 67.4 kg (148 lb 8 oz)   12/30/19 65.7 kg (144 lb 14.4 oz)     CONSTITUTIONAL: no acute distress  EYES: PERRLA, no palor or icterus.   ENT/MOUTH: no mouth lesions. Ears " normal  CVS: s1s2 no m r g .   RESPIRATORY: clear to auscultation b/l  GI: soft non tender no hepatosplenomegaly  NEURO: AAOX3  Grossly non focal neuro exam  INTEGUMENT: no obvious rashes  LYMPHATIC: no palpable cervical, supraclavicular, axillary or inguinal LAD  MUSCULOSKELETAL: Unremarkable. No bony tenderness.   EXTREMITIES: no edema  PSYCH: Mentation, mood and affect are normal. Decision making capacity is intact          Labs and imaging reviewed.    Results for KYLE GU (MRN [id number]) as of 1/28/2020 08:38   Ref. Range 1/28/2020 08:15   WBC Latest Ref Range: 4.0 - 11.0 10e9/L 2.8 (L)   Hemoglobin Latest Ref Range: 11.7 - 15.7 g/dL 11.9   Hematocrit Latest Ref Range: 35.0 - 47.0 % 37.0   Platelet Count Latest Ref Range: 150 - 450 10e9/L 167   RBC Count Latest Ref Range: 3.8 - 5.2 10e12/L 4.02   MCV Latest Ref Range: 78 - 100 fl 92   MCH Latest Ref Range: 26.5 - 33.0 pg 29.6   MCHC Latest Ref Range: 31.5 - 36.5 g/dL 32.2   RDW Latest Ref Range: 10.0 - 15.0 % 13.9   Diff Method Unknown Automated Method   % Neutrophils Latest Units: % 65.8   % Lymphocytes Latest Units: % 13.3   % Monocytes Latest Units: % 16.9   % Eosinophils Latest Units: % 2.2   % Basophils Latest Units: % 1.1   % Immature Granulocytes Latest Units: % 0.7   Absolute Neutrophil Latest Ref Range: 1.6 - 8.3 10e9/L 1.8   Absolute Lymphocytes Latest Ref Range: 0.8 - 5.3 10e9/L 0.4 (L)   Absolute Monocytes Latest Ref Range: 0.0 - 1.3 10e9/L 0.5   Absolute Eosinophils Latest Ref Range: 0.0 - 0.7 10e9/L 0.1   Absolute Basophils Latest Ref Range: 0.0 - 0.2 10e9/L 0.0     Results for KYLE GU (MRN [id number]) as of 1/28/2020 08:56   Ref. Range 1/28/2020 08:15   Sodium Latest Ref Range: 133 - 144 mmol/L 136   Potassium Latest Ref Range: 3.4 - 5.3 mmol/L 4.2   Chloride Latest Ref Range: 94 - 109 mmol/L 105   Carbon Dioxide Latest Ref Range: 20 - 32 mmol/L 29   Urea Nitrogen Latest Ref Range: 7 - 30 mg/dL 5 (L)   Creatinine Latest Ref Range:  0.52 - 1.04 mg/dL 0.56   GFR Estimate Latest Ref Range: >60 mL/min/1.73_m2 >90   GFR Estimate If Black Latest Ref Range: >60 mL/min/1.73_m2 >90   Calcium Latest Ref Range: 8.5 - 10.1 mg/dL 9.1   Anion Gap Latest Ref Range: 3 - 14 mmol/L 2 (L)   Albumin Latest Ref Range: 3.4 - 5.0 g/dL 3.2 (L)   Protein Total Latest Ref Range: 6.8 - 8.8 g/dL 6.3 (L)   Bilirubin Total Latest Ref Range: 0.2 - 1.3 mg/dL 0.3   Alkaline Phosphatase Latest Ref Range: 40 - 150 U/L 83   ALT Latest Ref Range: 0 - 50 U/L 18   AST Latest Ref Range: 0 - 45 U/L 17   Glucose Latest Ref Range: 70 - 99 mg/dL 106 (H)       Results for KYLE GU (MRN [id number]) as of 1/28/2020 08:38   Ref. Range 8/23/2016 13:00 9/4/2019 15:01 11/5/2019 14:00 12/4/2019 09:20 12/30/2019 10:15   CA 27-29 Latest Ref Range: 0 - 39 U/mL 28 415 (H) 149 (H) 153 (H) 187 (H)       Combined Report of:    PET and CT on  1/24/2020 11:40 AM :     1. PET of the neck, chest, abdomen, and pelvis.  2. PET CT Fusion for Attenuation Correction and Anatomical  Localization:    3. Diagnostic CT scan of the chest, abdomen, and pelvis with  intravenous contrast for interpretation.  3. CT of the chest, abdomen and pelvis obtained for diagnostic  interpretation.  4. 3D MIP and PET-CT fused images were processed on an independent  workstation and archived to PACS and reviewed by a radiologist.     Technique:     1. PET: The patient received 12.41 mCi of F-18-FDG; the serum glucose  was 110 prior to administration, body weight was 65.3 kg. Images were  evaluated in the axial, sagittal, and coronal planes as well as the  rotational whole body MIP. Images were acquired from the Vertex to the  Feet.     UPTAKE WAS MEASURED AT 60 MINUTES.      BACKGROUND:  Liver SUV max= 4.2,   Aorta Blood SUV Max: 3.2.      2. CT: Volumetric acquisition for clinical interpretation of the  chest, abdomen, and pelvis acquired at 3 mm sections . The chest,  abdomen, and pelvis were evaluated at 5 mm sections in  bone, soft  tissue, and lung windows.       The patient received 85 cc. Of Isovue 370 intravenously for the  examination.    --     3. 3D MIP and PET-CT fused images were processed on an independent  workstation and archived to PACS and reviewed by a radiologist.     INDICATION: follow up metastatic breast cancer to bones; Metastatic  breast cancer (H); Bone metastases (H)     ADDITIONAL INFORMATION OBTAINED FROM EMR: History of invasive lobular  carcinoma of the right breast. Diagnosed in 2006. Status post  radiation therapy to the right breast with modified radical mastectomy  and latissimus dorsi flap reconstruction. Prophylactic left mastectomy  with similar reconstruction. Brain MRI with multiple cavitary lesions  suspicious for metastatic disease. Status post radiation therapy from  T12 to L5 for metastatic lesions. Started on palliative Xeloda and  October 2019. Was intolerant of Xeloda and switched to paclitaxel on  11/5/2019     COMPARISON: PET CT 11/1/2019     FINDINGS:      HEAD/NECK:  There is no  suspicious FDG uptake in the neck.      Mucosal thickening in the right maxillary sinus.. The mastoid air  cells are clear.      The mucosal pharyngeal space, the , prevertebral and carotid  spaces are within normal limits.      No masses, mass effect or pathologically enlarged lymph nodes are  evident. The thyroid gland is normal.     CHEST:  There is no suspicious FDG uptake in the chest.      There are no pathologically enlarged mediastinal, hilar or axillary  lymph nodes. Calcified subcarinal lymph nodes.  There are no suspicious lung nodules or evidence for infection.   Resolution of previous left lower lobe consolidation.  Left-sided  Port-A-Cath with tip terminating in the superior cavoatrial junction.     There is no significant pericardial or plural effusions.     Postoperative changes of bilateral mastectomy with implant  reconstruction.     ABDOMEN AND PELVIS:  There is no suspicious FDG  uptake in the abdomen or pelvis.     There are no suspicious hepatic lesions. Focal hypoattenuation along  the falciform ligament consistent with focal fatty infiltration. There  is no splenomegaly or evidence for splenic or pancreatic mass lesion.     There are no suspicious adrenal mass lesions. Cholecystectomy clips.  There is symmetric nephrographic renal phase without hydronephrosis.  There is a 3 mm nonobstructing calyceal tip stone in the inferior pole  of the left kidney. Surgical clips in the left pelvis, stable.     There is no evidence for diverticulitis, bowel obstruction or free  fluid.     LOWER EXTREMITIES:   No abnormal masses or hypermetabolic lesions.     BONES:      Multiple lytic lesions including but not limited to:  -Lytic lesions in the right parietal bone. This is not hypermetabolic,  stable.  -Increase in size in lytic lesion within the vertebral body of C4  measuring 1.0 cm, previously 0.6 cm. There is a new central soft  tissue nodule. Maximal SUV of 4.1, previously nonhypermetabolic.  -Right proximal femur measuring 1.0 cm, previously 0.8 cm. In maximal  SUV of 3.9, unchanged.  -Right anterior iliac wing measuring 1.8 cm in length with maximal SUV  of 3.7, stable.  -Right superior iliac wing measuring 2.2 cm in length with maximal SUV  of 4.0, stable.  - Right iliac bone adjacent to the enterogastric and sacroiliac joint  max SUV 5.8, prior 3.5.  -Right lateral second rib with maximal SUV of 3.4, stable.  -Right lamina of T9 with maximal SUV of 4.7, previously 8.0  - Right 10th rib hypermetabolic unchanged.  Old bilateral rib fractures. Stable compression fracture of L1 with  approximately 50% height loss. Stable compression fracture of L4 with  approximately 10% height loss. Mild degenerative changes of the spine.                                                                         IMPRESSION: In this patient with history of breast cancer status post  multiple treatments and radiation  therapy:  1. Overall progression as evidence by:  a. Increase in size and hypermetabolic activity of lesions in C4  vertebral body, right femoral bone and right iliac bone lesion  adjacent to the sacroiliac joint.  b. Decrease in hypermetabolic activity of lesion in the right T9  lamina.  (This was likely above the radiation field T12-L5)  c. Multiple other stable metastatic osseous lesions.  d. No other findings suspicious for metastatic disease.  2. Resolution of previous left lower lobe consolidation, presumably  atelectasis or infection.  ASSESSMENT AND PLAN:   1.  History of stage IIB, T2 N1 M0 invasive lobular carcinoma of the right breast.  It was initially diagnosed in 2006 and at that time it was hormone receptor positive, HER-2 negative.  She was treated on ECOG 2104 protocol with dose-dense Adriamycin, Cytoxan and Avastin x4 cycles followed by Taxol and Avastin x4 cycles followed by a Avastin for 1 year.  She also received radiation to the right breast which she completed in January 2007 (5040 cGy).  She took Aromasin from 2007 to 2014.    Now she has metastatic breast cancer with extensive involvement of the bones.  There is also a left lower lobe hypermetabolic lesion and mediastinal lymph nodes which are hypermetabolic.  The biopsy from the right iliac bone is consistent with metastatic lobular breast cancer but it is hormone receptor and HER-2 negative and PDL 1 is also negative.    She has received 3000 cGy of palliative radiation to T12-L5 from 9/6/2019 till 9/18/2019.    She was started on palliative Xeloda but she was unable to tolerate even the dose reduced medication.        We decided on repeating scans and MRI brain on 10/25/2019 showed no intracranial metastatic disease.   Multiple enhancing calvarial lesions may be increased in number and are suggestive of metastatic disease. Thinner imaging on the current study may identify the smaller lesions and may be responsible for identified more  lesions.   There is a single focus of T2 hyperintense signal within the anterior right temporal lobe may represent interval demyelination. There is no evidence for active demyelination.    PET/CT on 11/1/2019 showed favorable response to therapy and Overall FDG uptake within scattered osseous metastases is decreased since 8/30/2019, particularly within the pelvis. Increased sclerotic appearance of L1 and L4 pathologic compression deformities, likely sequela of recently completed palliative radiation therapy.    No significant FDG uptake in previously demonstrated hypermetabolic mediastinal lymph nodes. Biopsy negative on 9/5/2019.  There is also decreased FDG uptake in the left lower lobe (biopsy negative for malignancy), now with dense consolidation containing air bronchograms suggestive of infection versus aspiration.  There is diffuse FDG uptake within the esophagus, consistent with esophagitis.    Because of intolerance to Xeloda we decided on switching to weekly paclitaxel on 11/5/2019.    For better tolerance we decreased the dose of paclitaxel to 70 mg/m  with cycle #2.  She has completed 3 cycles of Taxol and the last chemotherapy was on 1/14/2020.    She had a PET/CT on 1/24/2020 which showed progression of the disease in some of the bone lesions including increase in size and lytic lesion within the vertebral body of C4 measuring 1 cm as opposed to 0.6 cm previously and a new central soft tissue nodule with SUV max 4.1 when previously it was non-hypermetabolic.  There is also some progression noted in the right proximal femur and right iliac bone.  There is decreased metabolic uptake in the right lamina of T9 with SUV max 4.7 when previously it was 8.0.  Other lesions are stable.    There are no pathologically enlarged mediastinal, hilar or axillary lymph nodes. Calcified subcarinal lymph nodes. There are no suspicious lung nodules or evidence for infection and previous left lower lobe consolidation has  resolved.     I believe that due to progression, we should change to Eribulin. We discussed the rationale, schedule and potential side effects of it in detail.    She agrees to proceed.    Nausea and vomiting.  I will give her dexamethasone and Emend as premedication and she will use Compazine and lorazepam.        Bone metastasis.  She received Zometa on 10/23/2019 and 01/14/2020.  We will plan to give it every 3 months.  Although there has been progression in the bone metastasis but it will just after a couple of days of Zometa so at this time I am not going to change it to Xgeva.  If she continues to show progression in the bones then we will consider switching to Xgeva.  Continue vitamin D and calcium.    Vision changes.  She has retinal detachment.  She has longstanding myopia which puts people at increased risk of retinal detachment.  Most likely what happened that when she had a lot of nausea and vomiting from Xeloda she probably due to increased pressure had retinal detachment as she is noticing vision changes since then.  Follow with retina specialist.    Addendum.    1/28/2020   We got records from Ophthalmology and she has cystoid macular edema.  It was thought that this could be due to Taxol as patient reported to the ophthalmologist that she was on Taxol in September 2019 when her symptoms started.  But she was not on Taxol in September 2019 when she started noticing the visual changes so Taxol is not responsible for this.  The first dose of Taxol was on 11/5/2019.  That being said as mentioned above we will change the treatment to eribulin.    Lymphocytopenia.  Due to chemotherapy.  ANC is normal. Cont to observe.     We did not address the following today.      Pain management.  This is from the widely metastatic breast cancer to the bones.  Her pain basically is in the back and in the rib cage area and around the right shoulder blade.  Continue oxycodone as needed.  Previously she got palliative  radiation to T12-L5 3000 cGy in 10 fractions from 9/6/2019 till 9/18/2019.  She was evaluated by orthopedics Dr. Bipin Elliott on 11/1/2019 and conservative management was recommended.  Follow with palliative care.        Fatigue.  This is from the cancer and chemotherapy.  I again encouraged her to do regular exercise.  Continue to follow with cancer rehab.      Insomnia.  She is sleeping better on trazodone 50 mg at night and she will continue to do that.        Discussion regarding healthcare directives.  On previous visit she told me that she will bring the health care directive for our records but she forgot so I told her to bring it on next visit.      Breast implant removal.  She tells me that she was planning to do breast implant removal because there was a recall on this.  Her surgery was scheduled for 9/24/2019.  Because of this new development of metastatic breast cancer and her recent radiation, surgery has been canceled.  We discussed that at this time treatment for metastatic breast cancer would take precedence over the other surgery as the other surgery would require her to be off chemotherapy for several weeks and in that case the chances of cancer progression would be high and I would not recommend that.    Multiple sclerosis.  She will continue to follow with her neurologist Dr. Quiles.  Currently multiple sclerosis is under control and currently she is not taking any medications.    Return to clinic in 3 weeks.    I answered all of her questions to her satisfaction.  She is agreeable and comfortable with the plan.        Dewayne Zepeda MD

## 2020-01-28 NOTE — PROGRESS NOTES
Infusion Nursing Note:  Shaneka Alvarez presents today for C1 D1 [place].    Patient seen by provider today: Yes: Dr. Zepeda   present during visit today: Not Applicable.    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 Changing treatment today to [place]. All questions answered and patient and daughter verbalized understanding.     Intravenous Access:  Implanted Port.    Treatment Conditions:  Lab Results   Component Value Date    HGB 11.9 01/28/2020     Lab Results   Component Value Date    WBC 2.8 01/28/2020      Lab Results   Component Value Date    ANEU 1.8 01/28/2020     Lab Results   Component Value Date     01/28/2020      Lab Results   Component Value Date     01/28/2020                   Lab Results   Component Value Date    POTASSIUM 4.2 01/28/2020           No results found for: MAG         Lab Results   Component Value Date    CR 0.56 01/28/2020                   Lab Results   Component Value Date    RAFAELA 9.1 01/28/2020                Lab Results   Component Value Date    BILITOTAL 0.3 01/28/2020           Lab Results   Component Value Date    ALBUMIN 3.2 01/28/2020                    Lab Results   Component Value Date    ALT 18 01/28/2020           Lab Results   Component Value Date    AST 17 01/28/2020       Results reviewed, labs MET treatment parameters, ok to proceed with treatment.      Post Infusion Assessment:  Patient tolerated infusion without incident.  Blood return noted during administration every 5 ml during IVP.  Site patent and intact, free from redness, edema or discomfort.  No evidence of extravasations.  Access discontinued per protocol.       Discharge Plan:   Discharge instructions reviewed with: Patient and Family.  Patient and/or family verbalized understanding of discharge instructions and all questions answered.  Patient discharged in stable condition accompanied by: daughter.  Departure Mode: Ambulatory.    Yanci GODOY  TERRI Kate

## 2020-01-28 NOTE — PROGRESS NOTES
Port needle-one inch- left accessed for treatment. Tolerated port access and draw without complaint. Port site scrubbed with Chloraprep for 30 seconds and allowed to dry completely prior to Opsite sensitive skin dressing application. Accessed using sterile technique. [place] tubes drawn-Red gel/Green/Purple tubes. Double signed by patient and RN. See documentation flowsheet. Mariah Paniagua, RN, BSN, OCN

## 2020-01-28 NOTE — LETTER
1/28/2020         RE: Shaneka Alvarez  [address] [facility] [id number]        Dear Colleague,    Thank you for referring your patient, Shaneka Alvarez, to the [facility]. Please see a copy of my visit note below.    ONCOLOGY FOLLOW UP NOTE: 1/28/2020        I took the history from reviewing the previous notes that she was following with Dr. Amaya.  I have copied and updated from prior notes.    ONCOLOGY History:    1.  January 2006:  Diagnosed with Stage IIB, T2 N1 M0 invasive lobular carcinoma of the right breast.  Final pathology showed a 4.5 x 3.8 x 2.5 cm, 01/14 lymph nodes positive.  Estrogen, progesterone receptor positive, HER-2 negative.     2.  Genetics.  BRCA1 and 2 mutations not detected. 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       THERAPY TO DATE:   1. 2006:  ECOG 2104 protocol of dose-dense Adriamycin, Cytoxan and Avastin x4 cycles followed by Taxol and Avastin x4 cycles.   2.  03/2007:  Completed 1 year Avastin.   3.  11/2006-01/2007:  Radiotherapy to the right breast of 5040 cGy.   4.  [id number]:  Aromasin.  Stopped after moving to the [facility] [place].   5.  01/2016:  Right modified radical mastectomy with latissimus dorsi flap reconstruction and a left prophylactic mastectomy with latissimus dorsi flap reconstruction.     She recently had MRI of the brain as a follow-up for multiple sclerosis and there were multiple calvarial lesions noted which was suspicious for metastatic disease.  There was no evidence of new multiple sclerosis lesions.  She had a PET scan on 8/30/2019 which showed widespread bone metastatic lesions (calvarium, spine, sacrum, pelvis, ribs most prominent at C7, T3, L1 and L4), hypermetabolic 3 cm lesion in LLL, and mediastinal/hilar LN. CEA wnl at 1.9 and C27-29 elevated to 415 (28 on 8/23/16). A CT guided biopsy of the right iliac bone was obtained on 9/4/19, pathology consistent with metastatic adenocarcinoma (breast), ER/DE negative, HER-2  negative PDL 1 < 1%. A second biopsy was take of the LLL nodule via EBUS on 9/5/19 did not show any malignancy.    C/T/L spine MRI 8/3/19 to 9/2/19 with numerous enhancing lesions of the C, T and L spine, largest around T9 and L1. No evidence of cord compression. Has a L1 compression fracture and impending L4.     Received her radiation T12-L5 3000 cGy in 10 fractions from 9/6/2019 till 9/18/2019.  Neurosurgery recommended no surgical intervention but to wear [place] brace when out of bed and HOB >30 degrees    She started palliative Xeloda 2000/1500 on 10/1/2019 but after just a few doses she noticed nausea, red eyes blurred vision, loss of appetite.  She stopped taking it after 5 doses and was seen by nurse practitioner on 10/7/2019 when she was improving.  At that point she was restarted on Xeloda at a lower dose of 1000 mg twice a day.  As she was not able to tolerate even the lower dose with extreme nausea and vomiting and feeling extremely fatigued and blurry vision, we decided on stopping Xeloda.    We decided on repeating scans and MRI brain on 10/25/2019 showed no intracranial metastatic disease.   Multiple enhancing calvarial lesions may be increased in number and are suggestive of metastatic disease. Thinner imaging on the current study may identify the smaller lesions and may be responsible for  identified more lesions.   There is a single focus of T2 hyperintense signal within the anterior right temporal lobe may represent interval demyelination. There is no evidence for active demyelination.    PET/CT on 11/1/2019 showed favorable response to therapy and Overall FDG uptake within scattered osseous metastases is decreased since 8/30/2019, particularly within the pelvis. Increased sclerotic appearance of L1 and L4 pathologic compression deformities, likely sequela of recently completed palliative radiation therapy.    No significant FDG uptake in previously demonstrated hypermetabolic mediastinal lymph  nodes. Biopsy negative on 9/5/2019.  There is also decreased FDG uptake in the left lower lobe (biopsy negative for  malignancy), now with dense consolidation containing air bronchograms suggestive of infection versus aspiration.  There is diffuse FDG uptake within the esophagus, consistent with esophagitis.    Because of intolerance to Xeloda we decided on switching to weekly paclitaxel on 11/5/2019.    C#2 Taxol 12/4/19- started with 70mg/m2 dose reduction    C#3 Taxol 12/30/2019     I had also recommended starting Zometa so she got dental clearance to start with that.  She received Zometa on 10/23/2019 and 1/14/2020.  We plan to give it every 3 months.    She comes in today accompanied by her daughter.  Overall she has been doing fairly well.  She has off-and-on low back pain for which she takes oxycodone on average twice daily.  She continues to take calcium and vitamin D.  She is frustrated because of her vision changes and it is difficult for her to eat properly.  She was diagnosed with bilateral retinal detachment and she is going to see a retina specialist next week as well.  Denies any significant headaches or other new neurological problems apart from positional tingling and numbness in the left ring finger and left little finger.  It is not all the time and it is positional.  No neck pain.  Denies any significant diarrhea and she takes Imodium only occasionally.  She has off-and-on nausea and vomiting and thinks that Compazine and lorazepam are helping.  No new swellings.  No infections.  She has good and bad days in terms of energy.  Overall she is able to do light household chores.  She has baseline balance issues and heat sensitivity and weakness in the legs from multiple sclerosis.        ECOG 1-2    ROS:  A comprehensive ROS was otherwise neg      I reviewed other history in epic as below.       PAST MEDICAL HISTORY:     1.  Breast cancer  2.  Multiple sclerosis.   3.  Depression.   4.  Migraines.  "  5.  Hypertension.   6.  Cholecystectomy and umbilical hernia repair.     SOCIAL HISTORY: She smoked for 5 years but quit many years ago.  Drinks alcohol socially.  She lives with her .  She is a teacher in middle school.       FAMILY HISTORY: She mentions that her mother had breast cancer at 49.  Both grandmothers had breast cancer but she is not sure of the age.  A couple of cousins have cancers.  Patient has 3 children who are healthy.    Current Outpatient Medications   Medication     busPIRone (BUSPAR) 15 MG tablet     calcium citrate-vitamin D (CITRACAL) 315-250 MG-UNIT TABS     Cholecalciferol (VITAMIN D3 PO)     LORazepam (ATIVAN) 0.5 MG tablet     magnesium 250 MG tablet     oxyCODONE (ROXICODONE) 5 MG tablet     prochlorperazine (COMPAZINE) 10 MG tablet     propranolol (INDERAL LA) 60 MG 24 hr capsule     traZODone (DESYREL) 50 MG tablet     venlafaxine (EFFEXOR-ER) 225 MG TB24 24 hr tablet     acetaminophen (TYLENOL) 500 MG tablet     diclofenac (VOLTAREN) 1 % topical gel     naloxone (NARCAN) 1 mg/mL for intranasal kit (2 syringes with 2 mucosal atomizer device)     ondansetron (ZOFRAN-ODT) 8 MG ODT tab     polyethylene glycol (MIRALAX/GLYCOLAX) packet     senna-docusate (SENOKOT-S/PERICOLACE) 8.6-50 MG tablet     syringe/needle, disp, (B-D INTEGRA SYRINGE) 23G X 1\" 3 ML MISC     No current facility-administered medications for this visit.      Facility-Administered Medications Ordered in Other Visits   Medication     heparin 100 UNIT/ML injection 5 mL     sodium chloride (PF) 0.9% PF flush 10-20 mL          Allergies   Allergen Reactions     Bactrim [Sulfamethoxazole W/Trimethoprim]      Internal bleeding     Copaxone [Glatiramer] Hives          PHYSICAL EXAMINATION:   BP (!) 158/116 (BP Location: Left arm)   Pulse 80   Temp 97.9  F (36.6  C) (Oral)   Resp 16   Ht 1.575 m (5' 2.01\")   Wt 67.7 kg (149 lb 3.2 oz)   SpO2 97%   BMI 27.28 kg/m     Wt Readings from Last 4 Encounters:   01/28/20 " 67.7 kg (149 lb 3.2 oz)   01/14/20 65.3 kg (143 lb 14.4 oz)   01/07/20 67.4 kg (148 lb 8 oz)   12/30/19 65.7 kg (144 lb 14.4 oz)     CONSTITUTIONAL: no acute distress  EYES: PERRLA, no palor or icterus.   ENT/MOUTH: no mouth lesions. Ears normal  CVS: s1s2 no m r g .   RESPIRATORY: clear to auscultation b/l  GI: soft non tender no hepatosplenomegaly  NEURO: AAOX3  Grossly non focal neuro exam  INTEGUMENT: no obvious rashes  LYMPHATIC: no palpable cervical, supraclavicular, axillary or inguinal LAD  MUSCULOSKELETAL: Unremarkable. No bony tenderness.   EXTREMITIES: no edema  PSYCH: Mentation, mood and affect are normal. Decision making capacity is intact          Labs and imaging reviewed.    Results for KYLE GU (MRN [id number]) as of 1/28/2020 08:38   Ref. Range 1/28/2020 08:15   WBC Latest Ref Range: 4.0 - 11.0 10e9/L 2.8 (L)   Hemoglobin Latest Ref Range: 11.7 - 15.7 g/dL 11.9   Hematocrit Latest Ref Range: 35.0 - 47.0 % 37.0   Platelet Count Latest Ref Range: 150 - 450 10e9/L 167   RBC Count Latest Ref Range: 3.8 - 5.2 10e12/L 4.02   MCV Latest Ref Range: 78 - 100 fl 92   MCH Latest Ref Range: 26.5 - 33.0 pg 29.6   MCHC Latest Ref Range: 31.5 - 36.5 g/dL 32.2   RDW Latest Ref Range: 10.0 - 15.0 % 13.9   Diff Method Unknown Automated Method   % Neutrophils Latest Units: % 65.8   % Lymphocytes Latest Units: % 13.3   % Monocytes Latest Units: % 16.9   % Eosinophils Latest Units: % 2.2   % Basophils Latest Units: % 1.1   % Immature Granulocytes Latest Units: % 0.7   Absolute Neutrophil Latest Ref Range: 1.6 - 8.3 10e9/L 1.8   Absolute Lymphocytes Latest Ref Range: 0.8 - 5.3 10e9/L 0.4 (L)   Absolute Monocytes Latest Ref Range: 0.0 - 1.3 10e9/L 0.5   Absolute Eosinophils Latest Ref Range: 0.0 - 0.7 10e9/L 0.1   Absolute Basophils Latest Ref Range: 0.0 - 0.2 10e9/L 0.0     Results for KYLE GU (MRN [id number]) as of 1/28/2020 08:56   Ref. Range 1/28/2020 08:15   Sodium Latest Ref Range: 133 - 144 mmol/L  136   Potassium Latest Ref Range: 3.4 - 5.3 mmol/L 4.2   Chloride Latest Ref Range: 94 - 109 mmol/L 105   Carbon Dioxide Latest Ref Range: 20 - 32 mmol/L 29   Urea Nitrogen Latest Ref Range: 7 - 30 mg/dL 5 (L)   Creatinine Latest Ref Range: 0.52 - 1.04 mg/dL 0.56   GFR Estimate Latest Ref Range: >60 mL/min/1.73_m2 >90   GFR Estimate If Black Latest Ref Range: >60 mL/min/1.73_m2 >90   Calcium Latest Ref Range: 8.5 - 10.1 mg/dL 9.1   Anion Gap Latest Ref Range: 3 - 14 mmol/L 2 (L)   Albumin Latest Ref Range: 3.4 - 5.0 g/dL 3.2 (L)   Protein Total Latest Ref Range: 6.8 - 8.8 g/dL 6.3 (L)   Bilirubin Total Latest Ref Range: 0.2 - 1.3 mg/dL 0.3   Alkaline Phosphatase Latest Ref Range: 40 - 150 U/L 83   ALT Latest Ref Range: 0 - 50 U/L 18   AST Latest Ref Range: 0 - 45 U/L 17   Glucose Latest Ref Range: 70 - 99 mg/dL 106 (H)       Results for KYLE GU (MRN [id number]) as of 1/28/2020 08:38   Ref. Range 8/23/2016 13:00 9/4/2019 15:01 11/5/2019 14:00 12/4/2019 09:20 12/30/2019 10:15   CA 27-29 Latest Ref Range: 0 - 39 U/mL 28 415 (H) 149 (H) 153 (H) 187 (H)       Combined Report of:    PET and CT on  1/24/2020 11:40 AM :     1. PET of the neck, chest, abdomen, and pelvis.  2. PET CT Fusion for Attenuation Correction and Anatomical  Localization:    3. Diagnostic CT scan of the chest, abdomen, and pelvis with  intravenous contrast for interpretation.  3. CT of the chest, abdomen and pelvis obtained for diagnostic  interpretation.  4. 3D MIP and PET-CT fused images were processed on an independent  workstation and archived to PACS and reviewed by a radiologist.     Technique:     1. PET: The patient received 12.41 mCi of F-18-FDG; the serum glucose  was 110 prior to administration, body weight was 65.3 kg. Images were  evaluated in the axial, sagittal, and coronal planes as well as the  rotational whole body MIP. Images were acquired from the Vertex to the  Feet.     UPTAKE WAS MEASURED AT 60 MINUTES.      BACKGROUND:   Liver SUV max= 4.2,   Aorta Blood SUV Max: 3.2.      2. CT: Volumetric acquisition for clinical interpretation of the  chest, abdomen, and pelvis acquired at 3 mm sections . The chest,  abdomen, and pelvis were evaluated at 5 mm sections in bone, soft  tissue, and lung windows.       The patient received 85 cc. Of Isovue 370 intravenously for the  examination.    --     3. 3D MIP and PET-CT fused images were processed on an independent  workstation and archived to PACS and reviewed by a radiologist.     INDICATION: follow up metastatic breast cancer to bones; Metastatic  breast cancer (H); Bone metastases (H)     ADDITIONAL INFORMATION OBTAINED FROM EMR: History of invasive lobular  carcinoma of the right breast. Diagnosed in 2006. Status post  radiation therapy to the right breast with modified radical mastectomy  and latissimus dorsi flap reconstruction. Prophylactic left mastectomy  with similar reconstruction. Brain MRI with multiple cavitary lesions  suspicious for metastatic disease. Status post radiation therapy from  T12 to L5 for metastatic lesions. Started on palliative Xeloda and  October 2019. Was intolerant of Xeloda and switched to paclitaxel on  11/5/2019     COMPARISON: PET CT 11/1/2019     FINDINGS:      HEAD/NECK:  There is no  suspicious FDG uptake in the neck.      Mucosal thickening in the right maxillary sinus.. The mastoid air  cells are clear.      The mucosal pharyngeal space, the , prevertebral and carotid  spaces are within normal limits.      No masses, mass effect or pathologically enlarged lymph nodes are  evident. The thyroid gland is normal.     CHEST:  There is no suspicious FDG uptake in the chest.      There are no pathologically enlarged mediastinal, hilar or axillary  lymph nodes. Calcified subcarinal lymph nodes.  There are no suspicious lung nodules or evidence for infection.   Resolution of previous left lower lobe consolidation.  Left-sided  Port-A-Cath with tip  terminating in the superior cavoatrial junction.     There is no significant pericardial or plural effusions.     Postoperative changes of bilateral mastectomy with implant  reconstruction.     ABDOMEN AND PELVIS:  There is no suspicious FDG uptake in the abdomen or pelvis.     There are no suspicious hepatic lesions. Focal hypoattenuation along  the falciform ligament consistent with focal fatty infiltration. There  is no splenomegaly or evidence for splenic or pancreatic mass lesion.     There are no suspicious adrenal mass lesions. Cholecystectomy clips.  There is symmetric nephrographic renal phase without hydronephrosis.  There is a 3 mm nonobstructing calyceal tip stone in the inferior pole  of the left kidney. Surgical clips in the left pelvis, stable.     There is no evidence for diverticulitis, bowel obstruction or free  fluid.     LOWER EXTREMITIES:   No abnormal masses or hypermetabolic lesions.     BONES:      Multiple lytic lesions including but not limited to:  -Lytic lesions in the right parietal bone. This is not hypermetabolic,  stable.  -Increase in size in lytic lesion within the vertebral body of C4  measuring 1.0 cm, previously 0.6 cm. There is a new central soft  tissue nodule. Maximal SUV of 4.1, previously nonhypermetabolic.  -Right proximal femur measuring 1.0 cm, previously 0.8 cm. In maximal  SUV of 3.9, unchanged.  -Right anterior iliac wing measuring 1.8 cm in length with maximal SUV  of 3.7, stable.  -Right superior iliac wing measuring 2.2 cm in length with maximal SUV  of 4.0, stable.  - Right iliac bone adjacent to the enterogastric and sacroiliac joint  max SUV 5.8, prior 3.5.  -Right lateral second rib with maximal SUV of 3.4, stable.  -Right lamina of T9 with maximal SUV of 4.7, previously 8.0  - Right 10th rib hypermetabolic unchanged.  Old bilateral rib fractures. Stable compression fracture of L1 with  approximately 50% height loss. Stable compression fracture of L4  with  approximately 10% height loss. Mild degenerative changes of the spine.                                                                         IMPRESSION: In this patient with history of breast cancer status post  multiple treatments and radiation therapy:  1. Overall progression as evidence by:  a. Increase in size and hypermetabolic activity of lesions in C4  vertebral body, right femoral bone and right iliac bone lesion  adjacent to the sacroiliac joint.  b. Decrease in hypermetabolic activity of lesion in the right T9  lamina.  (This was likely above the radiation field T12-L5)  c. Multiple other stable metastatic osseous lesions.  d. No other findings suspicious for metastatic disease.  2. Resolution of previous left lower lobe consolidation, presumably  atelectasis or infection.  ASSESSMENT AND PLAN:   1.  History of stage IIB, T2 N1 M0 invasive lobular carcinoma of the right breast.  It was initially diagnosed in 2006 and at that time it was hormone receptor positive, HER-2 negative.  She was treated on ECOG 2104 protocol with dose-dense Adriamycin, Cytoxan and Avastin x4 cycles followed by Taxol and Avastin x4 cycles followed by a Avastin for 1 year.  She also received radiation to the right breast which she completed in January 2007 (5040 cGy).  She took Aromasin from 2007 to 2014.    Now she has metastatic breast cancer with extensive involvement of the bones.  There is also a left lower lobe hypermetabolic lesion and mediastinal lymph nodes which are hypermetabolic.  The biopsy from the right iliac bone is consistent with metastatic lobular breast cancer but it is hormone receptor and HER-2 negative and PDL 1 is also negative.    She has received 3000 cGy of palliative radiation to T12-L5 from 9/6/2019 till 9/18/2019.    She was started on palliative Xeloda but she was unable to tolerate even the dose reduced medication.        We decided on repeating scans and MRI brain on 10/25/2019 showed no  intracranial metastatic disease.   Multiple enhancing calvarial lesions may be increased in number and are suggestive of metastatic disease. Thinner imaging on the current study may identify the smaller lesions and may be responsible for identified more lesions.   There is a single focus of T2 hyperintense signal within the anterior right temporal lobe may represent interval demyelination. There is no evidence for active demyelination.    PET/CT on 11/1/2019 showed favorable response to therapy and Overall FDG uptake within scattered osseous metastases is decreased since 8/30/2019, particularly within the pelvis. Increased sclerotic appearance of L1 and L4 pathologic compression deformities, likely sequela of recently completed palliative radiation therapy.    No significant FDG uptake in previously demonstrated hypermetabolic mediastinal lymph nodes. Biopsy negative on 9/5/2019.  There is also decreased FDG uptake in the left lower lobe (biopsy negative for malignancy), now with dense consolidation containing air bronchograms suggestive of infection versus aspiration.  There is diffuse FDG uptake within the esophagus, consistent with esophagitis.    Because of intolerance to Xeloda we decided on switching to weekly paclitaxel on 11/5/2019.    For better tolerance we decreased the dose of paclitaxel to 70 mg/m  with cycle #2.  She has completed 3 cycles of Taxol and the last chemotherapy was on 1/14/2020.    She had a PET/CT on 1/24/2020 which showed progression of the disease in some of the bone lesions including increase in size and lytic lesion within the vertebral body of C4 measuring 1 cm as opposed to 0.6 cm previously and a new central soft tissue nodule with SUV max 4.1 when previously it was non-hypermetabolic.  There is also some progression noted in the right proximal femur and right iliac bone.  There is decreased metabolic uptake in the right lamina of T9 with SUV max 4.7 when previously it was 8.0.   Other lesions are stable.    There are no pathologically enlarged mediastinal, hilar or axillary lymph nodes. Calcified subcarinal lymph nodes. There are no suspicious lung nodules or evidence for infection and previous left lower lobe consolidation has resolved.     I believe that due to progression, we should change to Eribulin. We discussed the rationale, schedule and potential side effects of it in detail.    She agrees to proceed.    Nausea and vomiting.  I will give her dexamethasone and Emend as premedication and she will use Compazine and lorazepam.        Bone metastasis.  She received Zometa on 10/23/2019 and 01/14/2020.  We will plan to give it every 3 months.  Although there has been progression in the bone metastasis but it will just after a couple of days of Zometa so at this time I am not going to change it to Xgeva.  If she continues to show progression in the bones then we will consider switching to Xgeva.  Continue vitamin D and calcium.    Vision changes.  She has retinal detachment.  She has longstanding myopia which puts people at increased risk of retinal detachment.  Most likely what happened that when she had a lot of nausea and vomiting from Xeloda she probably due to increased pressure had retinal detachment as she is noticing vision changes since then.  Follow with retina specialist.    Lymphocytopenia.  Due to chemotherapy.  ANC is normal. Cont to observe.     We did not address the following today.      Pain management.  This is from the widely metastatic breast cancer to the bones.  Her pain basically is in the back and in the rib cage area and around the right shoulder blade.  Continue oxycodone as needed.  Previously she got palliative radiation to T12-L5 3000 cGy in 10 fractions from 9/6/2019 till 9/18/2019.  She was evaluated by orthopedics Dr. Bipin Elliott on 11/1/2019 and conservative management was recommended.  Follow with palliative care.        Fatigue.  This is from the  cancer and chemotherapy.  I again encouraged her to do regular exercise.  Continue to follow with cancer rehab.      Insomnia.  She is sleeping better on trazodone 50 mg at night and she will continue to do that.        Discussion regarding healthcare directives.  On previous visit she told me that she will bring the health care directive for our records but she forgot so I told her to bring it on next visit.      Breast implant removal.  She tells me that she was planning to do breast implant removal because there was a recall on this.  Her surgery was scheduled for 9/24/2019.  Because of this new development of metastatic breast cancer and her recent radiation, surgery has been canceled.  We discussed that at this time treatment for metastatic breast cancer would take precedence over the other surgery as the other surgery would require her to be off chemotherapy for several weeks and in that case the chances of cancer progression would be high and I would not recommend that.    Multiple sclerosis.  She will continue to follow with her neurologist Dr. Quiles.  Currently multiple sclerosis is under control and currently she is not taking any medications.    Return to clinic in 3 weeks.    I answered all of her questions to her satisfaction.  She is agreeable and comfortable with the plan.        Dewayne Zepeda MD      Again, thank you for allowing me to participate in the care of your patient.        Sincerely,        Dewayne Zepeda MD

## 2020-01-28 NOTE — NURSING NOTE
"Oncology Rooming Note    January 28, 2020 8:44 AM   Shaneka Alvarez is a 59 year old female who presents for:    Chief Complaint   Patient presents with     Oncology Clinic Visit     Follow up/prior to treatment     Initial Vitals: BP (!) 158/116 (BP Location: Left arm)   Pulse 80   Temp 97.9  F (36.6  C) (Oral)   Resp 16   Ht 1.575 m (5' 2.01\")   Wt 67.7 kg (149 lb 3.2 oz)   SpO2 97%   BMI 27.28 kg/m   Estimated body mass index is 27.28 kg/m  as calculated from the following:    Height as of this encounter: 1.575 m (5' 2.01\").    Weight as of this encounter: 67.7 kg (149 lb 3.2 oz). Body surface area is 1.72 meters squared.  No Pain (0) Comment: Data Unavailable   No LMP recorded. Patient is postmenopausal.  Allergies reviewed: Yes  Medications reviewed: Yes    Medications: MEDICATION REFILLS NEEDED TODAY. Provider was notified.  Pharmacy name entered into [facility]:    [id number] - [place], [place] - [address] AT [id number] [place], [place] - [address] AT [facility] [address]  [facility] - [place], [place] - [address]              "

## 2020-01-29 ENCOUNTER — PATIENT OUTREACH (OUTPATIENT)
Dept: ONCOLOGY | Facility: CLINIC | Age: 60
End: 2020-01-29

## 2020-01-29 NOTE — TELEPHONE ENCOUNTER
Recent office note visit with , 1/28/20 was faxed to Dr. Keegan Turner @ [facility] @ [phone].  FAX confirmed as received.

## 2020-01-31 ENCOUNTER — ANCILLARY PROCEDURE (OUTPATIENT)
Dept: MRI IMAGING | Facility: CLINIC | Age: 60
End: 2020-01-31
Attending: INTERNAL MEDICINE
Payer: COMMERCIAL

## 2020-01-31 ENCOUNTER — APPOINTMENT (OUTPATIENT)
Dept: GENERAL RADIOLOGY | Facility: CLINIC | Age: 60
End: 2020-01-31
Payer: COMMERCIAL

## 2020-01-31 DIAGNOSIS — C79.51 CARCINOMA OF BREAST METASTATIC TO BONE, UNSPECIFIED LATERALITY (H): ICD-10-CM

## 2020-01-31 DIAGNOSIS — C50.919 CARCINOMA OF BREAST METASTATIC TO BONE, UNSPECIFIED LATERALITY (H): ICD-10-CM

## 2020-01-31 DIAGNOSIS — R20.2 NUMBNESS AND TINGLING IN LEFT ARM: ICD-10-CM

## 2020-01-31 DIAGNOSIS — R20.0 NUMBNESS AND TINGLING IN LEFT ARM: ICD-10-CM

## 2020-01-31 PROCEDURE — 72156 MRI NECK SPINE W/O & W/DYE: CPT | Performed by: RADIOLOGY

## 2020-01-31 PROCEDURE — A9585 GADOBUTROL INJECTION: HCPCS | Performed by: INTERNAL MEDICINE

## 2020-01-31 RX ORDER — GADOBUTROL 604.72 MG/ML
7.5 INJECTION INTRAVENOUS ONCE
Status: COMPLETED | OUTPATIENT
Start: 2020-01-31 | End: 2020-01-31

## 2020-01-31 RX ADMIN — GADOBUTROL 7 ML: 604.72 INJECTION INTRAVENOUS at 10:59

## 2020-02-03 DIAGNOSIS — C50.919 CARCINOMA OF BREAST METASTATIC TO BONE, UNSPECIFIED LATERALITY (H): ICD-10-CM

## 2020-02-03 DIAGNOSIS — C50.919 METASTATIC BREAST CANCER: ICD-10-CM

## 2020-02-03 DIAGNOSIS — C50.912 BILATERAL MALIGNANT NEOPLASM OF BREAST IN FEMALE, UNSPECIFIED ESTROGEN RECEPTOR STATUS, UNSPECIFIED SITE OF BREAST (H): ICD-10-CM

## 2020-02-03 DIAGNOSIS — C50.911 BILATERAL MALIGNANT NEOPLASM OF BREAST IN FEMALE, UNSPECIFIED ESTROGEN RECEPTOR STATUS, UNSPECIFIED SITE OF BREAST (H): ICD-10-CM

## 2020-02-03 DIAGNOSIS — C79.51 CARCINOMA OF BREAST METASTATIC TO BONE, UNSPECIFIED LATERALITY (H): ICD-10-CM

## 2020-02-03 RX ORDER — MEPERIDINE HYDROCHLORIDE 25 MG/ML
25 INJECTION INTRAMUSCULAR; INTRAVENOUS; SUBCUTANEOUS EVERY 30 MIN PRN
Status: CANCELLED | OUTPATIENT
Start: 2020-02-04

## 2020-02-03 RX ORDER — SODIUM CHLORIDE 9 MG/ML
1000 INJECTION, SOLUTION INTRAVENOUS CONTINUOUS PRN
Status: CANCELLED
Start: 2020-02-04

## 2020-02-03 RX ORDER — ALBUTEROL SULFATE 90 UG/1
1-2 AEROSOL, METERED RESPIRATORY (INHALATION)
Status: CANCELLED
Start: 2020-02-04

## 2020-02-03 RX ORDER — NALOXONE HYDROCHLORIDE 0.4 MG/ML
.1-.4 INJECTION, SOLUTION INTRAMUSCULAR; INTRAVENOUS; SUBCUTANEOUS
Status: CANCELLED | OUTPATIENT
Start: 2020-02-04

## 2020-02-03 RX ORDER — HEPARIN SODIUM (PORCINE) LOCK FLUSH IV SOLN 100 UNIT/ML 100 UNIT/ML
5 SOLUTION INTRAVENOUS
Status: CANCELLED | OUTPATIENT
Start: 2020-02-04

## 2020-02-03 RX ORDER — ALBUTEROL SULFATE 0.83 MG/ML
2.5 SOLUTION RESPIRATORY (INHALATION)
Status: CANCELLED | OUTPATIENT
Start: 2020-02-04

## 2020-02-03 RX ORDER — EPINEPHRINE 1 MG/ML
0.3 INJECTION, SOLUTION INTRAMUSCULAR; SUBCUTANEOUS EVERY 5 MIN PRN
Status: CANCELLED | OUTPATIENT
Start: 2020-02-04

## 2020-02-03 RX ORDER — EPINEPHRINE 0.3 MG/.3ML
0.3 INJECTION SUBCUTANEOUS EVERY 5 MIN PRN
Status: CANCELLED | OUTPATIENT
Start: 2020-02-04

## 2020-02-03 RX ORDER — OXYCODONE HYDROCHLORIDE 5 MG/1
5-10 TABLET ORAL EVERY 4 HOURS PRN
Qty: 100 TABLET | Refills: 0 | Status: SHIPPED | OUTPATIENT
Start: 2020-02-03 | End: 2020-03-18

## 2020-02-03 RX ORDER — LORAZEPAM 2 MG/ML
0.5 INJECTION INTRAMUSCULAR EVERY 4 HOURS PRN
Status: CANCELLED
Start: 2020-02-04

## 2020-02-03 RX ORDER — METHYLPREDNISOLONE SODIUM SUCCINATE 125 MG/2ML
125 INJECTION, POWDER, LYOPHILIZED, FOR SOLUTION INTRAMUSCULAR; INTRAVENOUS
Status: CANCELLED
Start: 2020-02-04

## 2020-02-03 RX ORDER — HEPARIN SODIUM,PORCINE 10 UNIT/ML
5 VIAL (ML) INTRAVENOUS
Status: CANCELLED | OUTPATIENT
Start: 2020-02-04

## 2020-02-03 RX ORDER — DIPHENHYDRAMINE HYDROCHLORIDE 50 MG/ML
50 INJECTION INTRAMUSCULAR; INTRAVENOUS
Status: CANCELLED
Start: 2020-02-04

## 2020-02-03 NOTE — TELEPHONE ENCOUNTER
Last refill: 12/30  Last office visit: 12/04  Scheduled for follow up schedulers to call    Patient would like script  eprescribe to Shellie SOLIS  Report reviewed.

## 2020-02-04 ENCOUNTER — ONCOLOGY VISIT (OUTPATIENT)
Dept: ONCOLOGY | Facility: CLINIC | Age: 60
End: 2020-02-04
Payer: COMMERCIAL

## 2020-02-04 ENCOUNTER — INFUSION THERAPY VISIT (OUTPATIENT)
Dept: INFUSION THERAPY | Facility: CLINIC | Age: 60
End: 2020-02-04
Payer: COMMERCIAL

## 2020-02-04 VITALS
HEART RATE: 72 BPM | BODY MASS INDEX: 26.57 KG/M2 | DIASTOLIC BLOOD PRESSURE: 91 MMHG | OXYGEN SATURATION: 100 % | RESPIRATION RATE: 16 BRPM | WEIGHT: 145.3 LBS | SYSTOLIC BLOOD PRESSURE: 132 MMHG | TEMPERATURE: 98.1 F

## 2020-02-04 DIAGNOSIS — C50.912 BILATERAL MALIGNANT NEOPLASM OF BREAST IN FEMALE, UNSPECIFIED ESTROGEN RECEPTOR STATUS, UNSPECIFIED SITE OF BREAST (H): ICD-10-CM

## 2020-02-04 DIAGNOSIS — C50.919 METASTATIC BREAST CANCER: Primary | ICD-10-CM

## 2020-02-04 DIAGNOSIS — C50.911 BILATERAL MALIGNANT NEOPLASM OF BREAST IN FEMALE, UNSPECIFIED ESTROGEN RECEPTOR STATUS, UNSPECIFIED SITE OF BREAST (H): ICD-10-CM

## 2020-02-04 LAB
ALBUMIN SERPL-MCNC: 3.5 G/DL (ref 3.4–5)
ALP SERPL-CCNC: 78 U/L (ref 40–150)
ALT SERPL W P-5'-P-CCNC: 28 U/L (ref 0–50)
ANION GAP SERPL CALCULATED.3IONS-SCNC: 3 MMOL/L (ref 3–14)
AST SERPL W P-5'-P-CCNC: 21 U/L (ref 0–45)
BASOPHILS # BLD AUTO: 0.1 10E9/L (ref 0–0.2)
BASOPHILS NFR BLD AUTO: 1.2 %
BILIRUB SERPL-MCNC: 0.4 MG/DL (ref 0.2–1.3)
BUN SERPL-MCNC: 7 MG/DL (ref 7–30)
CALCIUM SERPL-MCNC: 8.9 MG/DL (ref 8.5–10.1)
CHLORIDE SERPL-SCNC: 104 MMOL/L (ref 94–109)
CO2 SERPL-SCNC: 30 MMOL/L (ref 20–32)
CREAT SERPL-MCNC: 0.56 MG/DL (ref 0.52–1.04)
DIFFERENTIAL METHOD BLD: ABNORMAL
EOSINOPHIL # BLD AUTO: 0 10E9/L (ref 0–0.7)
EOSINOPHIL NFR BLD AUTO: 0.5 %
ERYTHROCYTE [DISTWIDTH] IN BLOOD BY AUTOMATED COUNT: 13.6 % (ref 10–15)
GFR SERPL CREATININE-BSD FRML MDRD: >90 ML/MIN/{1.73_M2}
GLUCOSE SERPL-MCNC: 92 MG/DL (ref 70–99)
HCT VFR BLD AUTO: 36.9 % (ref 35–47)
HGB BLD-MCNC: 12.4 G/DL (ref 11.7–15.7)
IMM GRANULOCYTES # BLD: 0.1 10E9/L (ref 0–0.4)
IMM GRANULOCYTES NFR BLD: 2.2 %
LYMPHOCYTES # BLD AUTO: 0.7 10E9/L (ref 0.8–5.3)
LYMPHOCYTES NFR BLD AUTO: 16.2 %
MAGNESIUM SERPL-MCNC: 2.1 MG/DL (ref 1.6–2.3)
MCH RBC QN AUTO: 29.8 PG (ref 26.5–33)
MCHC RBC AUTO-ENTMCNC: 33.6 G/DL (ref 31.5–36.5)
MCV RBC AUTO: 89 FL (ref 78–100)
MONOCYTES # BLD AUTO: 0.2 10E9/L (ref 0–1.3)
MONOCYTES NFR BLD AUTO: 5.7 %
NEUTROPHILS # BLD AUTO: 3 10E9/L (ref 1.6–8.3)
NEUTROPHILS NFR BLD AUTO: 74.2 %
PLATELET # BLD AUTO: 187 10E9/L (ref 150–450)
POTASSIUM SERPL-SCNC: 3.3 MMOL/L (ref 3.4–5.3)
PROT SERPL-MCNC: 6.6 G/DL (ref 6.8–8.8)
RBC # BLD AUTO: 4.16 10E12/L (ref 3.8–5.2)
SODIUM SERPL-SCNC: 137 MMOL/L (ref 133–144)
WBC # BLD AUTO: 4 10E9/L (ref 4–11)

## 2020-02-04 PROCEDURE — 99207 ZZC NO CHARGE NURSE ONLY: CPT

## 2020-02-04 PROCEDURE — 96367 TX/PROPH/DG ADDL SEQ IV INF: CPT | Performed by: INTERNAL MEDICINE

## 2020-02-04 PROCEDURE — 80053 COMPREHEN METABOLIC PANEL: CPT | Performed by: INTERNAL MEDICINE

## 2020-02-04 PROCEDURE — 96409 CHEMO IV PUSH SNGL DRUG: CPT | Performed by: INTERNAL MEDICINE

## 2020-02-04 PROCEDURE — 99207 ZZC NO CHARGE LOS: CPT

## 2020-02-04 PROCEDURE — 85025 COMPLETE CBC W/AUTO DIFF WBC: CPT | Performed by: INTERNAL MEDICINE

## 2020-02-04 PROCEDURE — 83735 ASSAY OF MAGNESIUM: CPT | Performed by: INTERNAL MEDICINE

## 2020-02-04 RX ORDER — HEPARIN SODIUM (PORCINE) LOCK FLUSH IV SOLN 100 UNIT/ML 100 UNIT/ML
5 SOLUTION INTRAVENOUS
Status: DISCONTINUED | OUTPATIENT
Start: 2020-02-04 | End: 2020-02-04 | Stop reason: HOSPADM

## 2020-02-04 RX ORDER — HEPARIN SODIUM,PORCINE 10 UNIT/ML
5 VIAL (ML) INTRAVENOUS
Status: DISCONTINUED | OUTPATIENT
Start: 2020-02-04 | End: 2020-02-04 | Stop reason: HOSPADM

## 2020-02-04 RX ADMIN — HEPARIN SODIUM (PORCINE) LOCK FLUSH IV SOLN 100 UNIT/ML 5 ML: 100 SOLUTION at 13:49

## 2020-02-04 RX ADMIN — Medication 250 ML: at 13:06

## 2020-02-04 RX ADMIN — HEPARIN SODIUM (PORCINE) LOCK FLUSH IV SOLN 100 UNIT/ML 5 ML: 100 SOLUTION at 12:18

## 2020-02-04 ASSESSMENT — PAIN SCALES - GENERAL: PAINLEVEL: NO PAIN (0)

## 2020-02-04 NOTE — PROGRESS NOTES
"SPIRITUAL HEALTH SERVICES  SPIRITUAL ASSESSMENT Progress Note  [facility] Oncology Care       REFERRAL SOURCE: Follow up SH encounter     Visited briefly with Shaneka in the [facility] for spiritual support.   She is coming in for treatment 2 weeks on and 1 week off.  Her biggest concern is still her eyesight.  She continues to drive herself to treatments as she does not want to inconvenience people. She enjoys reading but is not able to do much of that now, she talked about getting \"audibles\".   Patient states that she has meet a lot of wonderful people along this cancer journey.  She voiced appreciation of prayer and SH visits.     PLAN: Patient knows that she can request a SH visit as needed.    Gina Pineda  Staff   [phone]  "

## 2020-02-04 NOTE — PROGRESS NOTES
Infusion Nursing Note:  Shaneka Alvarez presents today for C1D8 Eribulin.    Patient seen by provider today: No   present during visit today: Not Applicable.    Note:   States she tolerated the first chemo without any complications.    /97 upon arrival to the infusion department.    /91 after a minute of deep breathing and relaxation.    Wearing sunglasses today due to retinal problems.    Intravenous Access:  Implanted Port.    Treatment Conditions:  Lab Results   Component Value Date    HGB 12.4 02/04/2020     Lab Results   Component Value Date    WBC 4.0 02/04/2020      Lab Results   Component Value Date    ANEU 3.0 02/04/2020     Lab Results   Component Value Date     02/04/2020      Lab Results   Component Value Date     02/04/2020                   Lab Results   Component Value Date    POTASSIUM 3.3 02/04/2020           Lab Results   Component Value Date    MAG 2.1 02/04/2020            Lab Results   Component Value Date    CR 0.56 02/04/2020                   Lab Results   Component Value Date    RAFAELA 8.9 02/04/2020                Lab Results   Component Value Date    BILITOTAL 0.4 02/04/2020           Lab Results   Component Value Date    ALBUMIN 3.5 02/04/2020                    Lab Results   Component Value Date    ALT 28 02/04/2020           Lab Results   Component Value Date    AST 21 02/04/2020       Results reviewed, labs MET treatment parameters, ok to proceed with treatment.      Post Infusion Assessment:  Patient tolerated infusion without incident.  Blood return noted pre and post infusion.  Site patent and intact, free from redness, edema or discomfort.  No evidence of extravasations.  Access discontinued per protocol.       Discharge Plan:   Patient will return 2/18/2020 for next appointment.   Patient discharged in stable condition accompanied by: self.  Departure Mode: Ambulatory.    Lauren Shaw RN

## 2020-02-04 NOTE — PROGRESS NOTES
"Patient's name and  were verified.  See Doc Flowsheet - IV assess for details.  IVAD accessed with 20G 1\" mcnair gripper plus needle  blood return positive: YES  Site without redness, tenderness or swelling: YES  flushed with 30cc NS and 5cc 100u/ml heparin  Needle: left intact for chemotherapy  Comments: Labs drawn.  Patient tolerated procedure without incident.    Javier Roland  RN, BSN      "

## 2020-02-18 ENCOUNTER — INFUSION THERAPY VISIT (OUTPATIENT)
Dept: INFUSION THERAPY | Facility: CLINIC | Age: 60
End: 2020-02-18
Payer: COMMERCIAL

## 2020-02-18 ENCOUNTER — ONCOLOGY VISIT (OUTPATIENT)
Dept: ONCOLOGY | Facility: CLINIC | Age: 60
End: 2020-02-18
Payer: COMMERCIAL

## 2020-02-18 VITALS
BODY MASS INDEX: 26.3 KG/M2 | TEMPERATURE: 97.6 F | WEIGHT: 142.9 LBS | HEIGHT: 62 IN | OXYGEN SATURATION: 99 % | SYSTOLIC BLOOD PRESSURE: 138 MMHG | RESPIRATION RATE: 16 BRPM | DIASTOLIC BLOOD PRESSURE: 97 MMHG | HEART RATE: 72 BPM

## 2020-02-18 DIAGNOSIS — C50.912 BILATERAL MALIGNANT NEOPLASM OF BREAST IN FEMALE, UNSPECIFIED ESTROGEN RECEPTOR STATUS, UNSPECIFIED SITE OF BREAST (H): ICD-10-CM

## 2020-02-18 DIAGNOSIS — C50.919 METASTATIC BREAST CANCER: ICD-10-CM

## 2020-02-18 DIAGNOSIS — C50.919 METASTATIC BREAST CANCER: Primary | ICD-10-CM

## 2020-02-18 DIAGNOSIS — C50.911 BILATERAL MALIGNANT NEOPLASM OF BREAST IN FEMALE, UNSPECIFIED ESTROGEN RECEPTOR STATUS, UNSPECIFIED SITE OF BREAST (H): ICD-10-CM

## 2020-02-18 LAB
ALBUMIN SERPL-MCNC: 3.6 G/DL (ref 3.4–5)
ALP SERPL-CCNC: 92 U/L (ref 40–150)
ALT SERPL W P-5'-P-CCNC: 22 U/L (ref 0–50)
ANION GAP SERPL CALCULATED.3IONS-SCNC: 6 MMOL/L (ref 3–14)
AST SERPL W P-5'-P-CCNC: 24 U/L (ref 0–45)
BASOPHILS # BLD AUTO: 0.1 10E9/L (ref 0–0.2)
BASOPHILS NFR BLD AUTO: 4 %
BILIRUB SERPL-MCNC: 0.4 MG/DL (ref 0.2–1.3)
BUN SERPL-MCNC: 5 MG/DL (ref 7–30)
CALCIUM SERPL-MCNC: 9 MG/DL (ref 8.5–10.1)
CANCER AG27-29 SERPL-ACNC: 281 U/ML (ref 0–39)
CHLORIDE SERPL-SCNC: 105 MMOL/L (ref 94–109)
CO2 SERPL-SCNC: 25 MMOL/L (ref 20–32)
CREAT SERPL-MCNC: 0.51 MG/DL (ref 0.52–1.04)
DIFFERENTIAL METHOD BLD: ABNORMAL
ERYTHROCYTE [DISTWIDTH] IN BLOOD BY AUTOMATED COUNT: 14.1 % (ref 10–15)
GFR SERPL CREATININE-BSD FRML MDRD: >90 ML/MIN/{1.73_M2}
GLUCOSE SERPL-MCNC: 110 MG/DL (ref 70–99)
HCT VFR BLD AUTO: 40.2 % (ref 35–47)
HGB BLD-MCNC: 13.4 G/DL (ref 11.7–15.7)
LYMPHOCYTES # BLD AUTO: 0.7 10E9/L (ref 0.8–5.3)
LYMPHOCYTES NFR BLD AUTO: 26 %
MAGNESIUM SERPL-MCNC: 2.2 MG/DL (ref 1.6–2.3)
MCH RBC QN AUTO: 29.3 PG (ref 26.5–33)
MCHC RBC AUTO-ENTMCNC: 33.3 G/DL (ref 31.5–36.5)
MCV RBC AUTO: 88 FL (ref 78–100)
MONOCYTES # BLD AUTO: 0.6 10E9/L (ref 0–1.3)
MONOCYTES NFR BLD AUTO: 20 %
NEUTROPHILS # BLD AUTO: 1.4 10E9/L (ref 1.6–8.3)
NEUTROPHILS NFR BLD AUTO: 50 %
PLATELET # BLD AUTO: 244 10E9/L (ref 150–450)
PLATELET # BLD EST: ABNORMAL 10*3/UL
POTASSIUM SERPL-SCNC: 3.9 MMOL/L (ref 3.4–5.3)
PROT SERPL-MCNC: 7.2 G/DL (ref 6.8–8.8)
RBC # BLD AUTO: 4.58 10E12/L (ref 3.8–5.2)
RBC MORPH BLD: NORMAL
SODIUM SERPL-SCNC: 136 MMOL/L (ref 133–144)
WBC # BLD AUTO: 2.8 10E9/L (ref 4–11)

## 2020-02-18 PROCEDURE — 80053 COMPREHEN METABOLIC PANEL: CPT | Performed by: INTERNAL MEDICINE

## 2020-02-18 PROCEDURE — 99207 ZZC NO CHARGE NURSE ONLY: CPT

## 2020-02-18 PROCEDURE — 86300 IMMUNOASSAY TUMOR CA 15-3: CPT | Performed by: INTERNAL MEDICINE

## 2020-02-18 PROCEDURE — 99214 OFFICE O/P EST MOD 30 MIN: CPT | Mod: 25 | Performed by: INTERNAL MEDICINE

## 2020-02-18 PROCEDURE — 85025 COMPLETE CBC W/AUTO DIFF WBC: CPT | Performed by: INTERNAL MEDICINE

## 2020-02-18 PROCEDURE — 83735 ASSAY OF MAGNESIUM: CPT | Performed by: INTERNAL MEDICINE

## 2020-02-18 PROCEDURE — 96409 CHEMO IV PUSH SNGL DRUG: CPT | Performed by: INTERNAL MEDICINE

## 2020-02-18 PROCEDURE — 96367 TX/PROPH/DG ADDL SEQ IV INF: CPT | Performed by: INTERNAL MEDICINE

## 2020-02-18 RX ORDER — HEPARIN SODIUM (PORCINE) LOCK FLUSH IV SOLN 100 UNIT/ML 100 UNIT/ML
5 SOLUTION INTRAVENOUS
Status: CANCELLED | OUTPATIENT
Start: 2020-02-25

## 2020-02-18 RX ORDER — EPINEPHRINE 1 MG/ML
0.3 INJECTION, SOLUTION INTRAMUSCULAR; SUBCUTANEOUS EVERY 5 MIN PRN
Status: CANCELLED | OUTPATIENT
Start: 2020-02-25

## 2020-02-18 RX ORDER — DIPHENHYDRAMINE HYDROCHLORIDE 50 MG/ML
50 INJECTION INTRAMUSCULAR; INTRAVENOUS
Status: CANCELLED
Start: 2020-02-18

## 2020-02-18 RX ORDER — NALOXONE HYDROCHLORIDE 0.4 MG/ML
.1-.4 INJECTION, SOLUTION INTRAMUSCULAR; INTRAVENOUS; SUBCUTANEOUS
Status: CANCELLED | OUTPATIENT
Start: 2020-02-18

## 2020-02-18 RX ORDER — ALBUTEROL SULFATE 90 UG/1
1-2 AEROSOL, METERED RESPIRATORY (INHALATION)
Status: CANCELLED
Start: 2020-02-25

## 2020-02-18 RX ORDER — ALBUTEROL SULFATE 0.83 MG/ML
2.5 SOLUTION RESPIRATORY (INHALATION)
Status: CANCELLED | OUTPATIENT
Start: 2020-02-18

## 2020-02-18 RX ORDER — EPINEPHRINE 1 MG/ML
0.3 INJECTION, SOLUTION INTRAMUSCULAR; SUBCUTANEOUS EVERY 5 MIN PRN
Status: CANCELLED | OUTPATIENT
Start: 2020-02-18

## 2020-02-18 RX ORDER — DIPHENHYDRAMINE HYDROCHLORIDE 50 MG/ML
50 INJECTION INTRAMUSCULAR; INTRAVENOUS
Status: CANCELLED
Start: 2020-02-25

## 2020-02-18 RX ORDER — SODIUM CHLORIDE 9 MG/ML
1000 INJECTION, SOLUTION INTRAVENOUS CONTINUOUS PRN
Status: CANCELLED
Start: 2020-02-18

## 2020-02-18 RX ORDER — MEPERIDINE HYDROCHLORIDE 25 MG/ML
25 INJECTION INTRAMUSCULAR; INTRAVENOUS; SUBCUTANEOUS EVERY 30 MIN PRN
Status: CANCELLED | OUTPATIENT
Start: 2020-02-25

## 2020-02-18 RX ORDER — METHYLPREDNISOLONE SODIUM SUCCINATE 125 MG/2ML
125 INJECTION, POWDER, LYOPHILIZED, FOR SOLUTION INTRAMUSCULAR; INTRAVENOUS
Status: CANCELLED
Start: 2020-02-25

## 2020-02-18 RX ORDER — EPINEPHRINE 0.3 MG/.3ML
0.3 INJECTION SUBCUTANEOUS EVERY 5 MIN PRN
Status: CANCELLED | OUTPATIENT
Start: 2020-02-18

## 2020-02-18 RX ORDER — ALBUTEROL SULFATE 90 UG/1
1-2 AEROSOL, METERED RESPIRATORY (INHALATION)
Status: CANCELLED
Start: 2020-02-18

## 2020-02-18 RX ORDER — LORAZEPAM 2 MG/ML
0.5 INJECTION INTRAMUSCULAR EVERY 4 HOURS PRN
Status: CANCELLED
Start: 2020-02-18

## 2020-02-18 RX ORDER — HEPARIN SODIUM (PORCINE) LOCK FLUSH IV SOLN 100 UNIT/ML 100 UNIT/ML
5 SOLUTION INTRAVENOUS
Status: CANCELLED | OUTPATIENT
Start: 2020-02-18

## 2020-02-18 RX ORDER — HEPARIN SODIUM (PORCINE) LOCK FLUSH IV SOLN 100 UNIT/ML 100 UNIT/ML
5 SOLUTION INTRAVENOUS
Status: DISCONTINUED | OUTPATIENT
Start: 2020-02-18 | End: 2020-02-18 | Stop reason: HOSPADM

## 2020-02-18 RX ORDER — LORAZEPAM 2 MG/ML
0.5 INJECTION INTRAMUSCULAR EVERY 4 HOURS PRN
Status: CANCELLED
Start: 2020-02-25

## 2020-02-18 RX ORDER — NALOXONE HYDROCHLORIDE 0.4 MG/ML
.1-.4 INJECTION, SOLUTION INTRAMUSCULAR; INTRAVENOUS; SUBCUTANEOUS
Status: CANCELLED | OUTPATIENT
Start: 2020-02-25

## 2020-02-18 RX ORDER — HEPARIN SODIUM,PORCINE 10 UNIT/ML
5 VIAL (ML) INTRAVENOUS
Status: CANCELLED | OUTPATIENT
Start: 2020-02-25

## 2020-02-18 RX ORDER — HEPARIN SODIUM,PORCINE 10 UNIT/ML
5 VIAL (ML) INTRAVENOUS
Status: CANCELLED | OUTPATIENT
Start: 2020-02-18

## 2020-02-18 RX ORDER — EPINEPHRINE 0.3 MG/.3ML
0.3 INJECTION SUBCUTANEOUS EVERY 5 MIN PRN
Status: CANCELLED | OUTPATIENT
Start: 2020-02-25

## 2020-02-18 RX ORDER — SODIUM CHLORIDE 9 MG/ML
1000 INJECTION, SOLUTION INTRAVENOUS CONTINUOUS PRN
Status: CANCELLED
Start: 2020-02-25

## 2020-02-18 RX ORDER — MEPERIDINE HYDROCHLORIDE 25 MG/ML
25 INJECTION INTRAMUSCULAR; INTRAVENOUS; SUBCUTANEOUS EVERY 30 MIN PRN
Status: CANCELLED | OUTPATIENT
Start: 2020-02-18

## 2020-02-18 RX ORDER — METHYLPREDNISOLONE SODIUM SUCCINATE 125 MG/2ML
125 INJECTION, POWDER, LYOPHILIZED, FOR SOLUTION INTRAMUSCULAR; INTRAVENOUS
Status: CANCELLED
Start: 2020-02-18

## 2020-02-18 RX ORDER — ALBUTEROL SULFATE 0.83 MG/ML
2.5 SOLUTION RESPIRATORY (INHALATION)
Status: CANCELLED | OUTPATIENT
Start: 2020-02-25

## 2020-02-18 RX ADMIN — Medication 250 ML: at 10:02

## 2020-02-18 RX ADMIN — HEPARIN SODIUM (PORCINE) LOCK FLUSH IV SOLN 100 UNIT/ML 5 ML: 100 SOLUTION at 10:47

## 2020-02-18 RX ADMIN — HEPARIN SODIUM (PORCINE) LOCK FLUSH IV SOLN 100 UNIT/ML 5 ML: 100 SOLUTION at 08:50

## 2020-02-18 ASSESSMENT — PAIN SCALES - GENERAL: PAINLEVEL: NO PAIN (0)

## 2020-02-18 ASSESSMENT — MIFFLIN-ST. JEOR: SCORE: 1186.57

## 2020-02-18 NOTE — NURSING NOTE
"Oncology Rooming Note    February 18, 2020 9:06 AM   Shaneka Alvarez is a 59 year old female who presents for:    Chief Complaint   Patient presents with     Oncology Clinic Visit     Follow up/prior to infusion     Initial Vitals: BP (!) 138/97 (BP Location: Left arm)   Pulse 72   Temp 97.6  F (36.4  C) (Oral)   Resp 16   Ht 1.575 m (5' 2.01\")   Wt 64.8 kg (142 lb 14.4 oz)   SpO2 99%   BMI 26.13 kg/m   Estimated body mass index is 26.13 kg/m  as calculated from the following:    Height as of this encounter: 1.575 m (5' 2.01\").    Weight as of this encounter: 64.8 kg (142 lb 14.4 oz). Body surface area is 1.68 meters squared.  No Pain (0) Comment: Data Unavailable   No LMP recorded. Patient is postmenopausal.  Allergies reviewed: Yes  Medications reviewed: Yes    Medications: Medication refills not needed today.  Pharmacy name entered into [facility]:    [facility] #[id number] - [place], [place] - [address] AT [id number] [place], [place] - [address] AT [facility] [address] [facility] [address]  [facility] - [place], [place] - [address]              "

## 2020-02-18 NOTE — PROGRESS NOTES
ONCOLOGY FOLLOW UP NOTE: 2/18/2020        I took the history from reviewing the previous notes that she was following with Dr. Amaya.  I have copied and updated from prior notes.    ONCOLOGY History:    1.  January 2006:  Diagnosed with Stage IIB, T2 N1 M0 invasive lobular carcinoma of the right breast.  Final pathology showed a 4.5 x 3.8 x 2.5 cm, 01/14 lymph nodes positive.  Estrogen, progesterone receptor positive, HER-2 negative.     2.  Genetics.  BRCA1 and 2 mutations not detected. 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       THERAPY TO DATE:   1.  2006:  ECOG 2104 protocol of dose-dense Adriamycin, Cytoxan and Avastin x4 cycles followed by Taxol and Avastin x4 cycles.   2.  03/2007:  Completed 1 year Avastin.   3.  11/2006-01/2007:  Radiotherapy to the right breast of 5040 cGy.   4.  [id number]:  Aromasin.  Stopped after moving to the [facility].   5.  01/2016:  Right modified radical mastectomy with latissimus dorsi flap reconstruction and a left prophylactic mastectomy with latissimus dorsi flap reconstruction.     She recently had MRI of the brain as a follow-up for multiple sclerosis and there were multiple calvarial lesions noted which was suspicious for metastatic disease.  There was no evidence of new multiple sclerosis lesions.  She had a PET scan on 8/30/2019 which showed widespread bone metastatic lesions (calvarium, spine, sacrum, pelvis, ribs most prominent at C7, T3, L1 and L4), hypermetabolic 3 cm lesion in LLL, and mediastinal/hilar LN. CEA wnl at 1.9 and C27-29 elevated to 415 (28 on 8/23/16). A CT guided biopsy of the right iliac bone was obtained on 9/4/19, pathology consistent with metastatic adenocarcinoma (breast), ER/MN negative, HER-2 negative PDL 1 < 1%. A second biopsy was take of the LLL nodule via EBUS on 9/5/19 did not show any malignancy.    C/T/L spine MRI 8/3/19 to 9/2/19 with numerous enhancing lesions of the C, T and L spine, largest around T9 and L1. No evidence  of cord compression. Has a L1 compression fracture and impending L4.     Received radiation T12-L5 3000 cGy in 10 fractions from 9/6/2019 till 9/18/2019.  Neurosurgery recommended no surgical intervention but to wear Gorge brace when out of bed and HOB >30 degrees    She started palliative Xeloda 2000/1500 on 10/1/2019 but after just a few doses she noticed nausea, red eyes blurred vision, loss of appetite.  She stopped taking it after 5 doses and was seen by nurse practitioner on 10/7/2019 when she was improving.  At that point she was restarted on Xeloda at a lower dose of 1000 mg twice a day.  As she was not able to tolerate even the lower dose with extreme nausea and vomiting and feeling extremely fatigued and blurry vision, we decided on stopping Xeloda.    We decided on repeating scans and MRI brain on 10/25/2019 showed no intracranial metastatic disease.   Multiple enhancing calvarial lesions may be increased in number and are suggestive of metastatic disease. Thinner imaging on the current study may identify the smaller lesions and may be responsible for  identified more lesions.   There is a single focus of T2 hyperintense signal within the anterior right temporal lobe may represent interval demyelination. There is no evidence for active demyelination.    PET/CT on 11/1/2019 showed favorable response to therapy and Overall FDG uptake within scattered osseous metastases is decreased since 8/30/2019, particularly within the pelvis. Increased sclerotic appearance of L1 and L4 pathologic compression deformities, likely sequela of recently completed palliative radiation therapy.    No significant FDG uptake in previously demonstrated hypermetabolic mediastinal lymph nodes. Biopsy negative on 9/5/2019.  There is also decreased FDG uptake in the left lower lobe (biopsy negative for  malignancy), now with dense consolidation containing air bronchograms suggestive of infection versus aspiration.  There is diffuse  FDG uptake within the esophagus, consistent with esophagitis.    Because of intolerance to Xeloda we decided on switching to weekly paclitaxel on 11/5/2019.    C#2 Taxol 12/4/19- started with 70mg/m2 dose reduction    C#3 Taxol 12/30/2019     PET/CT on 1/24/2020 showed progression of the disease in some of the bone lesions including increase in size and lytic lesion within the vertebral body of C4 measuring 1 cm as opposed to 0.6 cm previously and a new central soft tissue nodule with SUV max 4.1 when previously it was non-hypermetabolic.  There is also some progression noted in the right proximal femur and right iliac bone.  There is decreased metabolic uptake in the right lamina of T9 with SUV max 4.7 when previously it was 8.0.  Other lesions are stable.    CA 27-29 also had increased significantly and it was 257 on 1/28/2020.    We decided on switching to eribulin on 1/28/2020.      I had also recommended starting Zometa so she got dental clearance to start with that.  She received Zometa on 10/23/2019 and 1/14/2020.  We plan to give it every 3 months.    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  It was thought that this could be due to Taxol as patient reported to the ophthalmologist that she was on Taxol in September 2019 when her symptoms started.  But she was not on Taxol in September 2019 when she started noticing the visual changes so Taxol is not responsible for this.  The first dose of Taxol was on 11/5/2019.         She comes in today accompanied by her daughter.  She tells me that she tolerated eribulin very well.  She did not have any nausea or vomiting.  Bowels are working well.  She is eating well.  Weight has remained stable.  Overall energy is fair and stable.  Denies any infections or shortness of breath.  She has mild neuropathy involving her fingers but this has not worsened with the start of this new chemotherapy.  Vision is about the same.  Currently  "denies any pain.    She has baseline balance issues and heat sensitivity and weakness in the legs from multiple sclerosis.        ECOG 1    ROS:  Rest of the comprehensive review of the system was essentially unremarkable.    I reviewed other history in epic as below.       PAST MEDICAL HISTORY:     1.  Breast cancer  2.  Multiple sclerosis.   3.  Depression.   4.  Migraines.   5.  Hypertension.   6.  Cholecystectomy and umbilical hernia repair.     SOCIAL HISTORY: She smoked for 5 years but quit many years ago.  Drinks alcohol socially.  She lives with her .  She is a teacher in middle school.       FAMILY HISTORY: She mentions that her mother had breast cancer at 49.  Both grandmothers had breast cancer but she is not sure of the age.  A couple of cousins have cancers.  Patient has 3 children who are healthy.    Current Outpatient Medications   Medication     busPIRone (BUSPAR) 15 MG tablet     calcium citrate-vitamin D (CITRACAL) 315-250 MG-UNIT TABS     Cholecalciferol (VITAMIN D3 PO)     LORazepam (ATIVAN) 0.5 MG tablet     magnesium 250 MG tablet     oxyCODONE (ROXICODONE) 5 MG tablet     prochlorperazine (COMPAZINE) 10 MG tablet     propranolol (INDERAL LA) 60 MG 24 hr capsule     syringe/needle, disp, (B-D [employer] SYRINGE) 23G X 1\" 3 ML MISC     traZODone (DESYREL) 50 MG tablet     venlafaxine (EFFEXOR-ER) 225 MG TB24 24 hr tablet     acetaminophen (TYLENOL) 500 MG tablet     diclofenac (VOLTAREN) 1 % topical gel     naloxone (NARCAN) 1 mg/mL for intranasal kit (2 syringes with 2 mucosal atomizer device)     ondansetron (ZOFRAN-ODT) 8 MG ODT tab     polyethylene glycol (MIRALAX/GLYCOLAX) packet     senna-docusate (SENOKOT-S/PERICOLACE) 8.6-50 MG tablet     No current facility-administered medications for this visit.      Facility-Administered Medications Ordered in Other Visits   Medication     heparin 100 UNIT/ML injection 5 mL     sodium chloride (PF) 0.9% PF flush 10-20 mL          Allergies " "  Allergen Reactions     Bactrim [Sulfamethoxazole W/Trimethoprim]      Internal bleeding     Copaxone [Glatiramer] Hives          PHYSICAL EXAMINATION:   BP (!) 138/97 (BP Location: Left arm)   Pulse 72   Temp 97.6  F (36.4  C) (Oral)   Resp 16   Ht 1.575 m (5' 2.01\")   Wt 64.8 kg (142 lb 14.4 oz)   SpO2 99%   BMI 26.13 kg/m    Wt Readings from Last 4 Encounters:   02/18/20 64.8 kg (142 lb 14.4 oz)   02/04/20 65.9 kg (145 lb 4.8 oz)   01/28/20 67.7 kg (149 lb 3.2 oz)   01/14/20 65.3 kg (143 lb 14.4 oz)     CONSTITUTIONAL: No apparent distress  EYES: PERRLA, without pallor or jaundice  ENT/MOUTH: Ears unremarkable. No oral lesions  CVS: s1s2 normal  RESPIRATORY: Chest is clear  GI: Abdomen is benign  NEURO: Alert and oriented ×3  INTEGUMENT: no concerning skin rashes   LYMPHATIC: no palpable lymphadenopathy  MUSCULOSKELETAL: Unremarkable. No bony tenderness.   EXTREMITIES: no pedal edema  PSYCH: Mentation, mood and affect are appropriate        Labs and imaging reviewed.    MR CERVICAL SPINE W/O & W CONTRAST 1/31/2020 11:04 AM     Provided History: left arm tingling- hx of breast cancer with spine  mets; Carcinoma of breast metastatic to bone, unspecified laterality  (H); Carcinoma of breast metastatic to bone, unspecified laterality  (H); Numbness and tingling in left arm; Numbness and tingling in left  arm  ICD-10: Carcinoma of breast metastatic to bone, unspecified laterality  (H); Carcinoma of breast metastatic to bone, unspecified laterality  (H); Numbness and tingling in left arm; Numbness and tingling in left  arm     Comparison: 19 2019     Technique: Sagittal T1-weighted, sagittal T2-weighted, sagittal  diffusion weighted, axial T2-weighted, and axial T2* gradient echo  images of the cervical spine were obtained without intravenous  contrast. Following intravenous administration of gadolinium, axial  and sagittal T1-weighted images with fat saturation were also  obtained.     Contrast: 7 ml " Gadavist     Findings:  The cervical vertebrae are normally aligned. Mild straightening of the  normal cervical lordosis. There is no disc height narrowing at any  level.  There is normal signal within and normal contour of the  cervical spinal cord.     There are multiple enhancing foci within the cervical vertebral bodies  and posterior elements which have increased in size since the prior  study. For example the lesion in C7 is mildly increased measuring 11 x  10 mm where this previously measured 10 x 9 mm. The lesion in C4 now  measures 9 x 9 mm were this previously measured 5 x 5 mm. There are  lesions in the vertebral bodies of C2, C4, C6, C7, T1, T2, T3 and the  posterior elements of C3, C5 and C6. There is no epidural or neural  foraminal involvement. The degenerative changes in the cervical spine  are stable.                                                                      Impression:   1. Mild progression of the lesions in the cervical and upper thoracic  spine compared to the prior. No epidural or neural foraminal  involvement is seen..  2. Mild cervical spondylosis, unchanged. No significant spinal canal  stenosis or neural foraminal narrowing.    Combined Report of:    PET and CT on  1/24/2020 11:40 AM :     1. PET of the neck, chest, abdomen, and pelvis.  2. PET CT Fusion for Attenuation Correction and Anatomical  Localization:    3. Diagnostic CT scan of the chest, abdomen, and pelvis with  intravenous contrast for interpretation.  3. CT of the chest, abdomen and pelvis obtained for diagnostic  interpretation.  4. 3D MIP and PET-CT fused images were processed on an independent  workstation and archived to PACS and reviewed by a radiologist.     Technique:     1. PET: The patient received 12.41 mCi of F-18-FDG; the serum glucose  was 110 prior to administration, body weight was 65.3 kg. Images were  evaluated in the axial, sagittal, and coronal planes as well as the  rotational whole body MIP.  Images were acquired from the Vertex to the  Feet.     UPTAKE WAS MEASURED AT 60 MINUTES.      BACKGROUND:  Liver SUV max= 4.2,   Aorta Blood SUV Max: 3.2.      2. CT: Volumetric acquisition for clinical interpretation of the  chest, abdomen, and pelvis acquired at 3 mm sections . The chest,  abdomen, and pelvis were evaluated at 5 mm sections in bone, soft  tissue, and lung windows.       The patient received 85 cc. Of Isovue 370 intravenously for the  examination.    --     3. 3D MIP and PET-CT fused images were processed on an independent  workstation and archived to PACS and reviewed by a radiologist.     INDICATION: follow up metastatic breast cancer to bones; Metastatic  breast cancer (H); Bone metastases (H)     ADDITIONAL INFORMATION OBTAINED FROM EMR: History of invasive lobular  carcinoma of the right breast. Diagnosed in 2006. Status post  radiation therapy to the right breast with modified radical mastectomy  and latissimus dorsi flap reconstruction. Prophylactic left mastectomy  with similar reconstruction. Brain MRI with multiple cavitary lesions  suspicious for metastatic disease. Status post radiation therapy from  T12 to L5 for metastatic lesions. Started on palliative Xeloda and  October 2019. Was intolerant of Xeloda and switched to paclitaxel on  11/5/2019     COMPARISON: PET CT 11/1/2019     FINDINGS:      HEAD/NECK:  There is no  suspicious FDG uptake in the neck.      Mucosal thickening in the right maxillary sinus.. The mastoid air  cells are clear.      The mucosal pharyngeal space, the , prevertebral and carotid  spaces are within normal limits.      No masses, mass effect or pathologically enlarged lymph nodes are  evident. The thyroid gland is normal.     CHEST:  There is no suspicious FDG uptake in the chest.      There are no pathologically enlarged mediastinal, hilar or axillary  lymph nodes. Calcified subcarinal lymph nodes.  There are no suspicious lung nodules or evidence  for infection.   Resolution of previous left lower lobe consolidation.  Left-sided  Port-A-Cath with tip terminating in the superior cavoatrial junction.     There is no significant pericardial or plural effusions.     Postoperative changes of bilateral mastectomy with implant  reconstruction.     ABDOMEN AND PELVIS:  There is no suspicious FDG uptake in the abdomen or pelvis.     There are no suspicious hepatic lesions. Focal hypoattenuation along  the falciform ligament consistent with focal fatty infiltration. There  is no splenomegaly or evidence for splenic or pancreatic mass lesion.     There are no suspicious adrenal mass lesions. Cholecystectomy clips.  There is symmetric nephrographic renal phase without hydronephrosis.  There is a 3 mm nonobstructing calyceal tip stone in the inferior pole  of the left kidney. Surgical clips in the left pelvis, stable.     There is no evidence for diverticulitis, bowel obstruction or free  fluid.     LOWER EXTREMITIES:   No abnormal masses or hypermetabolic lesions.     BONES:      Multiple lytic lesions including but not limited to:  -Lytic lesions in the right parietal bone. This is not hypermetabolic,  stable.  -Increase in size in lytic lesion within the vertebral body of C4  measuring 1.0 cm, previously 0.6 cm. There is a new central soft  tissue nodule. Maximal SUV of 4.1, previously nonhypermetabolic.  -Right proximal femur measuring 1.0 cm, previously 0.8 cm. In maximal  SUV of 3.9, unchanged.  -Right anterior iliac wing measuring 1.8 cm in length with maximal SUV  of 3.7, stable.  -Right superior iliac wing measuring 2.2 cm in length with maximal SUV  of 4.0, stable.  - Right iliac bone adjacent to the enterogastric and sacroiliac joint  max SUV 5.8, prior 3.5.  -Right lateral second rib with maximal SUV of 3.4, stable.  -Right lamina of T9 with maximal SUV of 4.7, previously 8.0  - Right 10th rib hypermetabolic unchanged.  Old bilateral rib fractures. Stable  compression fracture of L1 with  approximately 50% height loss. Stable compression fracture of L4 with  approximately 10% height loss. Mild degenerative changes of the spine.                                                                         IMPRESSION: In this patient with history of breast cancer status post  multiple treatments and radiation therapy:  1. Overall progression as evidence by:  a. Increase in size and hypermetabolic activity of lesions in C4  vertebral body, right femoral bone and right iliac bone lesion  adjacent to the sacroiliac joint.  b. Decrease in hypermetabolic activity of lesion in the right T9  lamina.  (This was likely above the radiation field T12-L5)  c. Multiple other stable metastatic osseous lesions.  d. No other findings suspicious for metastatic disease.  2. Resolution of previous left lower lobe consolidation, presumably  atelectasis or infection.      ASSESSMENT AND PLAN:   1.  History of stage IIB, T2 N1 M0 invasive lobular carcinoma of the right breast.  It was initially diagnosed in 2006 and at that time it was hormone receptor positive, HER-2 negative.  She was treated on ECOG 2104 protocol with dose-dense Adriamycin, Cytoxan and Avastin x4 cycles followed by Taxol and Avastin x4 cycles followed by a Avastin for 1 year.  She also received radiation to the right breast which she completed in January 2007 (5040 cGy).  She took Aromasin from 2007 to 2014.    Now she has metastatic breast cancer with extensive involvement of the bones.  There is also a left lower lobe hypermetabolic lesion and mediastinal lymph nodes which are hypermetabolic.  The biopsy from the right iliac bone is consistent with metastatic lobular breast cancer but it is hormone receptor and HER-2 negative and PDL 1 is also negative.    She has received 3000 cGy of palliative radiation to T12-L5 from 9/6/2019 till 9/18/2019.    She was started on palliative Xeloda but she was unable to tolerate even the  dose reduced medication.        We decided on repeating scans and MRI brain on 10/25/2019 showed no intracranial metastatic disease.   Multiple enhancing calvarial lesions may be increased in number and are suggestive of metastatic disease. Thinner imaging on the current study may identify the smaller lesions and may be responsible for identified more lesions.   There is a single focus of T2 hyperintense signal within the anterior right temporal lobe may represent interval demyelination. There is no evidence for active demyelination.    PET/CT on 11/1/2019 showed favorable response to therapy and Overall FDG uptake within scattered osseous metastases is decreased since 8/30/2019, particularly within the pelvis. Increased sclerotic appearance of L1 and L4 pathologic compression deformities, likely sequela of recently completed palliative radiation therapy.    No significant FDG uptake in previously demonstrated hypermetabolic mediastinal lymph nodes. Biopsy negative on 9/5/2019.  There is also decreased FDG uptake in the left lower lobe (biopsy negative for malignancy), now with dense consolidation containing air bronchograms suggestive of infection versus aspiration.  There is diffuse FDG uptake within the esophagus, consistent with esophagitis.    Because of intolerance to Xeloda we decided on switching to weekly paclitaxel on 11/5/2019.    For better tolerance we decreased the dose of paclitaxel to 70 mg/m  with cycle #2.  She has completed 3 cycles of Taxol and the last chemotherapy was on 1/14/2020.      PET/CT on 1/24/2020 showed progression of the disease in some of the bone lesions including increase in size and lytic lesion within the vertebral body of C4 measuring 1 cm as opposed to 0.6 cm previously and a new central soft tissue nodule with SUV max 4.1 when previously it was non-hypermetabolic.  There is also some progression noted in the right proximal femur and right iliac bone.  There is decreased  metabolic uptake in the right lamina of T9 with SUV max 4.7 when previously it was 8.0.  Other lesions are stable.    There are no pathologically enlarged mediastinal, hilar or axillary lymph nodes. Calcified subcarinal lymph nodes. There are no suspicious lung nodules or evidence for infection and previous left lower lobe consolidation has resolved.     CA 27-29 also had increased significantly and it was 257 on 1/28/2020.    Due to progression we switched to eribulin on 1/28/2020.    Overall she tolerated this well.  We will proceed with cycle #2 today.    Leukopenia.  WBC is 2.8.  Preliminary neutrophil count is 1.5.  I believe we can give chemotherapy today.  We discussed that if she develops any fevers or infections then she would need prompt medical attention.  She understands that.    Nausea and vomiting.  Dexamethasone and Emend as premedication really worked well.  She will continue to use Compazine and lorazepam as needed.        Bone metastasis.  She received Zometa on 10/23/2019 and 01/14/2020.  Continue every 3 months.  She will need it in April 2020.  Continue calcium and vitamin D.        Vision changes.    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  It was thought that this could be due to Taxol as patient reported to the ophthalmologist that she was on Taxol in September 2019 when her symptoms started.  But she was not on Taxol in September 2019 when she started noticing the visual changes so Taxol is not responsible for this.  The first dose of Taxol was on 11/5/2019.   That being said we have now switched the treatment to eribulin.  Continue to follow with ophthalmology.  She was told that the vision would take several weeks to improve.        We did not address the following today.      Pain management.  This is from the widely metastatic breast cancer to the bones.  Her pain basically is in the back and in the rib cage area and around the right shoulder  blade.  Continue oxycodone as needed.  Previously she got palliative radiation to T12-L5 3000 cGy in 10 fractions from 9/6/2019 till 9/18/2019.  She was evaluated by orthopedics Dr. Bipin Elliott on 11/1/2019 and conservative management was recommended.  Follow with palliative care.        Fatigue.  This is from the cancer and chemotherapy.  I again encouraged her to do regular exercise.  Continue to follow with cancer rehab.      Insomnia.  She is sleeping better on trazodone 50 mg at night and she will continue to do that.        Discussion regarding healthcare directives.  On previous visit she told me that she will bring the health care directive for our records but she forgot so I told her to bring it on next visit.      Breast implant removal.  She tells me that she was planning to do breast implant removal because there was a recall on this.  Her surgery was scheduled for 9/24/2019.  Because of this new development of metastatic breast cancer and her recent radiation, surgery has been canceled.  We discussed that at this time treatment for metastatic breast cancer would take precedence over the other surgery as the other surgery would require her to be off chemotherapy for several weeks and in that case the chances of cancer progression would be high and I would not recommend that.    Multiple sclerosis.  She will continue to follow with her neurologist Dr. Quiles.  Currently multiple sclerosis is under control and currently she is not taking any medications.     I will see her back in 3 weeks.    All questions answered.  She is agreeable and comfortable with the plan.        Dewayne Zepeda MD

## 2020-02-18 NOTE — LETTER
2/18/2020         RE: Shaneka Alvarez  [id number] [facility] [address]        Dear Colleague,    Thank you for referring your patient, Shaneka Alvarez, to the [facility]. Please see a copy of my visit note below.    ONCOLOGY FOLLOW UP NOTE: 2/18/2020        I took the history from reviewing the previous notes that she was following with Dr. Amaya.  I have copied and updated from prior notes.    ONCOLOGY History:    1.  January 2006:  Diagnosed with Stage IIB, T2 N1 M0 invasive lobular carcinoma of the right breast.  Final pathology showed a 4.5 x 3.8 x 2.5 cm, 01/14 lymph nodes positive.  Estrogen, progesterone receptor positive, HER-2 negative.     2.  Genetics.  BRCA1 and 2 mutations not detected. 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       THERAPY TO DATE:   1. 2006:  ECOG 2104 protocol of dose-dense Adriamycin, Cytoxan and Avastin x4 cycles followed by Taxol and Avastin x4 cycles.   2.  03/2007:  Completed 1 year Avastin.   3.  11/2006-01/2007:  Radiotherapy to the right breast of 5040 cGy.   4.  [id number]:  Aromasin.  Stopped after moving to the [facility].   5.  01/2016:  Right modified radical mastectomy with latissimus dorsi flap reconstruction and a left prophylactic mastectomy with latissimus dorsi flap reconstruction.     She recently had MRI of the brain as a follow-up for multiple sclerosis and there were multiple calvarial lesions noted which was suspicious for metastatic disease.  There was no evidence of new multiple sclerosis lesions.  She had a PET scan on 8/30/2019 which showed widespread bone metastatic lesions (calvarium, spine, sacrum, pelvis, ribs most prominent at C7, T3, L1 and L4), hypermetabolic 3 cm lesion in LLL, and mediastinal/hilar LN. CEA wnl at 1.9 and C27-29 elevated to 415 (28 on 8/23/16). A CT guided biopsy of the right iliac bone was obtained on 9/4/19, pathology consistent with metastatic adenocarcinoma (breast), ER/OH negative, HER-2  negative PDL 1 < 1%. A second biopsy was take of the LLL nodule via EBUS on 9/5/19 did not show any malignancy.    C/T/L spine MRI 8/3/19 to 9/2/19 with numerous enhancing lesions of the C, T and L spine, largest around T9 and L1. No evidence of cord compression. Has a L1 compression fracture and impending L4.     Received radiation T12-L5 3000 cGy in 10 fractions from 9/6/2019 till 9/18/2019.  Neurosurgery recommended no surgical intervention but to wear Lyman brace when out of bed and HOB >30 degrees    She started palliative Xeloda 2000/1500 on 10/1/2019 but after just a few doses she noticed nausea, red eyes blurred vision, loss of appetite.  She stopped taking it after 5 doses and was seen by nurse practitioner on 10/7/2019 when she was improving.  At that point she was restarted on Xeloda at a lower dose of 1000 mg twice a day.  As she was not able to tolerate even the lower dose with extreme nausea and vomiting and feeling extremely fatigued and blurry vision, we decided on stopping Xeloda.    We decided on repeating scans and MRI brain on 10/25/2019 showed no intracranial metastatic disease.   Multiple enhancing calvarial lesions may be increased in number and are suggestive of metastatic disease. Thinner imaging on the current study may identify the smaller lesions and may be responsible for  identified more lesions.   There is a single focus of T2 hyperintense signal within the anterior right temporal lobe may represent interval demyelination. There is no evidence for active demyelination.    PET/CT on 11/1/2019 showed favorable response to therapy and Overall FDG uptake within scattered osseous metastases is decreased since 8/30/2019, particularly within the pelvis. Increased sclerotic appearance of L1 and L4 pathologic compression deformities, likely sequela of recently completed palliative radiation therapy.    No significant FDG uptake in previously demonstrated hypermetabolic mediastinal lymph nodes.  Biopsy negative on 9/5/2019.  There is also decreased FDG uptake in the left lower lobe (biopsy negative for  malignancy), now with dense consolidation containing air bronchograms suggestive of infection versus aspiration.  There is diffuse FDG uptake within the esophagus, consistent with esophagitis.    Because of intolerance to Xeloda we decided on switching to weekly paclitaxel on 11/5/2019.    C#2 Taxol 12/4/19- started with 70mg/m2 dose reduction    C#3 Taxol 12/30/2019     PET/CT on 1/24/2020 showed progression of the disease in some of the bone lesions including increase in size and lytic lesion within the vertebral body of C4 measuring 1 cm as opposed to 0.6 cm previously and a new central soft tissue nodule with SUV max 4.1 when previously it was non-hypermetabolic.  There is also some progression noted in the right proximal femur and right iliac bone.  There is decreased metabolic uptake in the right lamina of T9 with SUV max 4.7 when previously it was 8.0.  Other lesions are stable.    CA 27-29 also had increased significantly and it was 257 on 1/28/2020.    We decided on switching to eribulin on 1/28/2020.      I had also recommended starting Zometa so she got dental clearance to start with that.  She received Zometa on 10/23/2019 and 1/14/2020.  We plan to give it every 3 months.    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  It was thought that this could be due to Taxol as patient reported to the ophthalmologist that she was on Taxol in September 2019 when her symptoms started.  But she was not on Taxol in September 2019 when she started noticing the visual changes so Taxol is not responsible for this.  The first dose of Taxol was on 11/5/2019.         She comes in today accompanied by her daughter.  She tells me that she tolerated eribulin very well.  She did not have any nausea or vomiting.  Bowels are working well.  She is eating well.  Weight has remained  "stable.  Overall energy is fair and stable.  Denies any infections or shortness of breath.  She has mild neuropathy involving her fingers but this has not worsened with the start of this new chemotherapy.  Vision is about the same.  Currently denies any pain.    She has baseline balance issues and heat sensitivity and weakness in the legs from multiple sclerosis.        ECOG 1    ROS:  Rest of the comprehensive review of the system was essentially unremarkable.    I reviewed other history in epic as below.       PAST MEDICAL HISTORY:     1.  Breast cancer  2.  Multiple sclerosis.   3.  Depression.   4.  Migraines.   5.  Hypertension.   6.  Cholecystectomy and umbilical hernia repair.     SOCIAL HISTORY: She smoked for 5 years but quit many years ago.  Drinks alcohol socially.  She lives with her .  She is a teacher in middle school.       FAMILY HISTORY: She mentions that her mother had breast cancer at 49.  Both grandmothers had breast cancer but she is not sure of the age.  A couple of cousins have cancers.  Patient has 3 children who are healthy.    Current Outpatient Medications   Medication     busPIRone (BUSPAR) 15 MG tablet     calcium citrate-vitamin D (CITRACAL) 315-250 MG-UNIT TABS     Cholecalciferol (VITAMIN D3 PO)     LORazepam (ATIVAN) 0.5 MG tablet     magnesium 250 MG tablet     oxyCODONE (ROXICODONE) 5 MG tablet     prochlorperazine (COMPAZINE) 10 MG tablet     propranolol (INDERAL LA) 60 MG 24 hr capsule     syringe/needle, disp, (B-D [employer] SYRINGE) 23G X 1\" 3 ML MISC     traZODone (DESYREL) 50 MG tablet     venlafaxine (EFFEXOR-ER) 225 MG TB24 24 hr tablet     acetaminophen (TYLENOL) 500 MG tablet     diclofenac (VOLTAREN) 1 % topical gel     naloxone (NARCAN) 1 mg/mL for intranasal kit (2 syringes with 2 mucosal atomizer device)     ondansetron (ZOFRAN-ODT) 8 MG ODT tab     polyethylene glycol (MIRALAX/GLYCOLAX) packet     senna-docusate (SENOKOT-S/PERICOLACE) 8.6-50 MG tablet     No " "current facility-administered medications for this visit.      Facility-Administered Medications Ordered in Other Visits   Medication     heparin 100 UNIT/ML injection 5 mL     sodium chloride (PF) 0.9% PF flush 10-20 mL          Allergies   Allergen Reactions     Bactrim [Sulfamethoxazole W/Trimethoprim]      Internal bleeding     Copaxone [Glatiramer] Hives          PHYSICAL EXAMINATION:   BP (!) 138/97 (BP Location: Left arm)   Pulse 72   Temp 97.6  F (36.4  C) (Oral)   Resp 16   Ht 1.575 m (5' 2.01\")   Wt 64.8 kg (142 lb 14.4 oz)   SpO2 99%   BMI 26.13 kg/m     Wt Readings from Last 4 Encounters:   02/18/20 64.8 kg (142 lb 14.4 oz)   02/04/20 65.9 kg (145 lb 4.8 oz)   01/28/20 67.7 kg (149 lb 3.2 oz)   01/14/20 65.3 kg (143 lb 14.4 oz)     CONSTITUTIONAL: No apparent distress  EYES: PERRLA, without pallor or jaundice  ENT/MOUTH: Ears unremarkable. No oral lesions  CVS: s1s2 normal  RESPIRATORY: Chest is clear  GI: Abdomen is benign  NEURO: Alert and oriented ×3  INTEGUMENT: no concerning skin rashes   LYMPHATIC: no palpable lymphadenopathy  MUSCULOSKELETAL: Unremarkable. No bony tenderness.   EXTREMITIES: no pedal edema  PSYCH: Mentation, mood and affect are appropriate        Labs and imaging reviewed.    MR CERVICAL SPINE W/O & W CONTRAST 1/31/2020 11:04 AM     Provided History: left arm tingling- hx of breast cancer with spine  mets; Carcinoma of breast metastatic to bone, unspecified laterality  (H); Carcinoma of breast metastatic to bone, unspecified laterality  (H); Numbness and tingling in left arm; Numbness and tingling in left  arm  ICD-10: Carcinoma of breast metastatic to bone, unspecified laterality  (H); Carcinoma of breast metastatic to bone, unspecified laterality  (H); Numbness and tingling in left arm; Numbness and tingling in left  arm     Comparison: 19 2019     Technique: Sagittal T1-weighted, sagittal T2-weighted, sagittal  diffusion weighted, axial T2-weighted, and axial T2* gradient " echo  images of the cervical spine were obtained without intravenous  contrast. Following intravenous administration of gadolinium, axial  and sagittal T1-weighted images with fat saturation were also  obtained.     Contrast: 7 ml Gadavist     Findings:  The cervical vertebrae are normally aligned. Mild straightening of the  normal cervical lordosis. There is no disc height narrowing at any  level.  There is normal signal within and normal contour of the  cervical spinal cord.     There are multiple enhancing foci within the cervical vertebral bodies  and posterior elements which have increased in size since the prior  study. For example the lesion in C7 is mildly increased measuring 11 x  10 mm where this previously measured 10 x 9 mm. The lesion in C4 now  measures 9 x 9 mm were this previously measured 5 x 5 mm. There are  lesions in the vertebral bodies of C2, C4, C6, C7, T1, T2, T3 and the  posterior elements of C3, C5 and C6. There is no epidural or neural  foraminal involvement. The degenerative changes in the cervical spine  are stable.                                                                      Impression:   1. Mild progression of the lesions in the cervical and upper thoracic  spine compared to the prior. No epidural or neural foraminal  involvement is seen..  2. Mild cervical spondylosis, unchanged. No significant spinal canal  stenosis or neural foraminal narrowing.    Combined Report of:    PET and CT on  1/24/2020 11:40 AM :     1. PET of the neck, chest, abdomen, and pelvis.  2. PET CT Fusion for Attenuation Correction and Anatomical  Localization:    3. Diagnostic CT scan of the chest, abdomen, and pelvis with  intravenous contrast for interpretation.  3. CT of the chest, abdomen and pelvis obtained for diagnostic  interpretation.  4. 3D MIP and PET-CT fused images were processed on an independent  workstation and archived to PACS and reviewed by a radiologist.     Technique:     1. PET: The  patient received 12.41 mCi of F-18-FDG; the serum glucose  was 110 prior to administration, body weight was 65.3 kg. Images were  evaluated in the axial, sagittal, and coronal planes as well as the  rotational whole body MIP. Images were acquired from the Vertex to the  Feet.     UPTAKE WAS MEASURED AT 60 MINUTES.      BACKGROUND:  Liver SUV max= 4.2,   Aorta Blood SUV Max: 3.2.      2. CT: Volumetric acquisition for clinical interpretation of the  chest, abdomen, and pelvis acquired at 3 mm sections . The chest,  abdomen, and pelvis were evaluated at 5 mm sections in bone, soft  tissue, and lung windows.       The patient received 85 cc. Of Isovue 370 intravenously for the  examination.    --     3. 3D MIP and PET-CT fused images were processed on an independent  workstation and archived to PACS and reviewed by a radiologist.     INDICATION: follow up metastatic breast cancer to bones; Metastatic  breast cancer (H); Bone metastases (H)     ADDITIONAL INFORMATION OBTAINED FROM EMR: History of invasive lobular  carcinoma of the right breast. Diagnosed in 2006. Status post  radiation therapy to the right breast with modified radical mastectomy  and latissimus dorsi flap reconstruction. Prophylactic left mastectomy  with similar reconstruction. Brain MRI with multiple cavitary lesions  suspicious for metastatic disease. Status post radiation therapy from  T12 to L5 for metastatic lesions. Started on palliative Xeloda and  October 2019. Was intolerant of Xeloda and switched to paclitaxel on  11/5/2019     COMPARISON: PET CT 11/1/2019     FINDINGS:      HEAD/NECK:  There is no  suspicious FDG uptake in the neck.      Mucosal thickening in the right maxillary sinus.. The mastoid air  cells are clear.      The mucosal pharyngeal space, the , prevertebral and carotid  spaces are within normal limits.      No masses, mass effect or pathologically enlarged lymph nodes are  evident. The thyroid gland is  normal.     CHEST:  There is no suspicious FDG uptake in the chest.      There are no pathologically enlarged mediastinal, hilar or axillary  lymph nodes. Calcified subcarinal lymph nodes.  There are no suspicious lung nodules or evidence for infection.   Resolution of previous left lower lobe consolidation.  Left-sided  Port-A-Cath with tip terminating in the superior cavoatrial junction.     There is no significant pericardial or plural effusions.     Postoperative changes of bilateral mastectomy with implant  reconstruction.     ABDOMEN AND PELVIS:  There is no suspicious FDG uptake in the abdomen or pelvis.     There are no suspicious hepatic lesions. Focal hypoattenuation along  the falciform ligament consistent with focal fatty infiltration. There  is no splenomegaly or evidence for splenic or pancreatic mass lesion.     There are no suspicious adrenal mass lesions. Cholecystectomy clips.  There is symmetric nephrographic renal phase without hydronephrosis.  There is a 3 mm nonobstructing calyceal tip stone in the inferior pole  of the left kidney. Surgical clips in the left pelvis, stable.     There is no evidence for diverticulitis, bowel obstruction or free  fluid.     LOWER EXTREMITIES:   No abnormal masses or hypermetabolic lesions.     BONES:      Multiple lytic lesions including but not limited to:  -Lytic lesions in the right parietal bone. This is not hypermetabolic,  stable.  -Increase in size in lytic lesion within the vertebral body of C4  measuring 1.0 cm, previously 0.6 cm. There is a new central soft  tissue nodule. Maximal SUV of 4.1, previously nonhypermetabolic.  -Right proximal femur measuring 1.0 cm, previously 0.8 cm. In maximal  SUV of 3.9, unchanged.  -Right anterior iliac wing measuring 1.8 cm in length with maximal SUV  of 3.7, stable.  -Right superior iliac wing measuring 2.2 cm in length with maximal SUV  of 4.0, stable.  - Right iliac bone adjacent to the enterogastric and  sacroiliac joint  max SUV 5.8, prior 3.5.  -Right lateral second rib with maximal SUV of 3.4, stable.  -Right lamina of T9 with maximal SUV of 4.7, previously 8.0  - Right 10th rib hypermetabolic unchanged.  Old bilateral rib fractures. Stable compression fracture of L1 with  approximately 50% height loss. Stable compression fracture of L4 with  approximately 10% height loss. Mild degenerative changes of the spine.                                                                         IMPRESSION: In this patient with history of breast cancer status post  multiple treatments and radiation therapy:  1. Overall progression as evidence by:  a. Increase in size and hypermetabolic activity of lesions in C4  vertebral body, right femoral bone and right iliac bone lesion  adjacent to the sacroiliac joint.  b. Decrease in hypermetabolic activity of lesion in the right T9  lamina.  (This was likely above the radiation field T12-L5)  c. Multiple other stable metastatic osseous lesions.  d. No other findings suspicious for metastatic disease.  2. Resolution of previous left lower lobe consolidation, presumably  atelectasis or infection.      ASSESSMENT AND PLAN:   1.  History of stage IIB, T2 N1 M0 invasive lobular carcinoma of the right breast.  It was initially diagnosed in 2006 and at that time it was hormone receptor positive, HER-2 negative.  She was treated on ECOG 2104 protocol with dose-dense Adriamycin, Cytoxan and Avastin x4 cycles followed by Taxol and Avastin x4 cycles followed by a Avastin for 1 year.  She also received radiation to the right breast which she completed in January 2007 (5040 cGy).  She took Aromasin from 2007 to 2014.    Now she has metastatic breast cancer with extensive involvement of the bones.  There is also a left lower lobe hypermetabolic lesion and mediastinal lymph nodes which are hypermetabolic.  The biopsy from the right iliac bone is consistent with metastatic lobular breast cancer but  it is hormone receptor and HER-2 negative and PDL 1 is also negative.    She has received 3000 cGy of palliative radiation to T12-L5 from 9/6/2019 till 9/18/2019.    She was started on palliative Xeloda but she was unable to tolerate even the dose reduced medication.        We decided on repeating scans and MRI brain on 10/25/2019 showed no intracranial metastatic disease.   Multiple enhancing calvarial lesions may be increased in number and are suggestive of metastatic disease. Thinner imaging on the current study may identify the smaller lesions and may be responsible for identified more lesions.   There is a single focus of T2 hyperintense signal within the anterior right temporal lobe may represent interval demyelination. There is no evidence for active demyelination.    PET/CT on 11/1/2019 showed favorable response to therapy and Overall FDG uptake within scattered osseous metastases is decreased since 8/30/2019, particularly within the pelvis. Increased sclerotic appearance of L1 and L4 pathologic compression deformities, likely sequela of recently completed palliative radiation therapy.    No significant FDG uptake in previously demonstrated hypermetabolic mediastinal lymph nodes. Biopsy negative on 9/5/2019.  There is also decreased FDG uptake in the left lower lobe (biopsy negative for malignancy), now with dense consolidation containing air bronchograms suggestive of infection versus aspiration.  There is diffuse FDG uptake within the esophagus, consistent with esophagitis.    Because of intolerance to Xeloda we decided on switching to weekly paclitaxel on 11/5/2019.    For better tolerance we decreased the dose of paclitaxel to 70 mg/m  with cycle #2.  She has completed 3 cycles of Taxol and the last chemotherapy was on 1/14/2020.      PET/CT on 1/24/2020 showed progression of the disease in some of the bone lesions including increase in size and lytic lesion within the vertebral body of C4 measuring 1 cm  as opposed to 0.6 cm previously and a new central soft tissue nodule with SUV max 4.1 when previously it was non-hypermetabolic.  There is also some progression noted in the right proximal femur and right iliac bone.  There is decreased metabolic uptake in the right lamina of T9 with SUV max 4.7 when previously it was 8.0.  Other lesions are stable.    There are no pathologically enlarged mediastinal, hilar or axillary lymph nodes. Calcified subcarinal lymph nodes. There are no suspicious lung nodules or evidence for infection and previous left lower lobe consolidation has resolved.     CA 27-29 also had increased significantly and it was 257 on 1/28/2020.    Due to progression we switched to eribulin on 1/28/2020.    Overall she tolerated this well.  We will proceed with cycle #2 today.    Leukopenia.  WBC is 2.8.  Preliminary neutrophil count is 1.5.  I believe we can give chemotherapy today.  We discussed that if she develops any fevers or infections then she would need prompt medical attention.  She understands that.    Nausea and vomiting.  Dexamethasone and Emend as premedication really worked well.  She will continue to use Compazine and lorazepam as needed.        Bone metastasis.  She received Zometa on 10/23/2019 and 01/14/2020.  Continue every 3 months.  She will need it in April 2020.  Continue calcium and vitamin D.        Vision changes.    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  It was thought that this could be due to Taxol as patient reported to the ophthalmologist that she was on Taxol in September 2019 when her symptoms started.  But she was not on Taxol in September 2019 when she started noticing the visual changes so Taxol is not responsible for this.  The first dose of Taxol was on 11/5/2019.   That being said we have now switched the treatment to eribulin.  Continue to follow with ophthalmology.  She was told that the vision would take several weeks to  improve.        We did not address the following today.      Pain management.  This is from the widely metastatic breast cancer to the bones.  Her pain basically is in the back and in the rib cage area and around the right shoulder blade.  Continue oxycodone as needed.  Previously she got palliative radiation to T12-L5 3000 cGy in 10 fractions from 9/6/2019 till 9/18/2019.  She was evaluated by orthopedics Dr. Bipin Elliott on 11/1/2019 and conservative management was recommended.  Follow with palliative care.        Fatigue.  This is from the cancer and chemotherapy.  I again encouraged her to do regular exercise.  Continue to follow with cancer rehab.      Insomnia.  She is sleeping better on trazodone 50 mg at night and she will continue to do that.        Discussion regarding healthcare directives.  On previous visit she told me that she will bring the health care directive for our records but she forgot so I told her to bring it on next visit.      Breast implant removal.  She tells me that she was planning to do breast implant removal because there was a recall on this.  Her surgery was scheduled for 9/24/2019.  Because of this new development of metastatic breast cancer and her recent radiation, surgery has been canceled.  We discussed that at this time treatment for metastatic breast cancer would take precedence over the other surgery as the other surgery would require her to be off chemotherapy for several weeks and in that case the chances of cancer progression would be high and I would not recommend that.    Multiple sclerosis.  She will continue to follow with her neurologist Dr. Quiles.  Currently multiple sclerosis is under control and currently she is not taking any medications.     I will see her back in 3 weeks.    All questions answered.  She is agreeable and comfortable with the plan.        Dewayne Zepeda MD      Again, thank you for allowing me to participate in the care of your patient.         Sincerely,        Dewayne Zepeda MD

## 2020-02-18 NOTE — PROGRESS NOTES
Infusion Nursing Note:  Shaneka Alvarez presents today for C2D1 [place] ([place]).    Patient seen by provider today: Yes: Dr. Zepeda   present during visit today: Not Applicable.    Note: N/A.    Intravenous Access:  Implanted Port.    Treatment Conditions:  Lab Results   Component Value Date    HGB 13.4 02/18/2020     Lab Results   Component Value Date    WBC 2.8 02/18/2020      Lab Results   Component Value Date    ANEU 1.4 02/18/2020     Lab Results   Component Value Date     02/18/2020      Lab Results   Component Value Date     02/18/2020                   Lab Results   Component Value Date    POTASSIUM 3.9 02/18/2020           Lab Results   Component Value Date    MAG 2.2 02/18/2020            Lab Results   Component Value Date    CR 0.51 02/18/2020                   Lab Results   Component Value Date    RAFAELA 9.0 02/18/2020                Lab Results   Component Value Date    BILITOTAL 0.4 02/18/2020           Lab Results   Component Value Date    ALBUMIN 3.6 02/18/2020                    Lab Results   Component Value Date    ALT 22 02/18/2020           Lab Results   Component Value Date    AST 24 02/18/2020       Results reviewed, labs MET treatment parameters, ok to proceed with treatment.  Give if ANC >= 1200, and plt >= 75,000    Post Infusion Assessment:  Patient tolerated infusion without incident.   Blood return noted pre and post infusion.  Blood return noted during administration every 2 cc.  Site patent and intact, free from redness, edema or discomfort.  No evidence of extravasations.  Access discontinued per protocol.       Discharge Plan:   Patient will return 2/25/2020 for next appointment.   Patient discharged in stable condition accompanied by: daughter.  Departure Mode: Ambulatory.      Mariah Paniagua RN

## 2020-02-18 NOTE — PROGRESS NOTES
Port needle (one inch, Opsite dressing) left accessed for treatment. Tolerated port access and draw without complaint. Port site scrubbed with Chloraprep for 30 seconds and allowed to dry completely prior to dressing application. Accessed using sterile technique. [place] tubes drawn-Red gel/Green/Purple tubes. Double signed by patient and RN. See documentation flowsheet. Mariah Paniagua, RN, BSN, OCN

## 2020-02-25 ENCOUNTER — INFUSION THERAPY VISIT (OUTPATIENT)
Dept: INFUSION THERAPY | Facility: CLINIC | Age: 60
End: 2020-02-25
Payer: COMMERCIAL

## 2020-02-25 ENCOUNTER — ONCOLOGY VISIT (OUTPATIENT)
Dept: ONCOLOGY | Facility: CLINIC | Age: 60
End: 2020-02-25
Payer: COMMERCIAL

## 2020-02-25 VITALS
DIASTOLIC BLOOD PRESSURE: 89 MMHG | SYSTOLIC BLOOD PRESSURE: 130 MMHG | HEART RATE: 69 BPM | WEIGHT: 144.6 LBS | OXYGEN SATURATION: 98 % | BODY MASS INDEX: 26.44 KG/M2 | TEMPERATURE: 98.1 F

## 2020-02-25 DIAGNOSIS — C50.912 BILATERAL MALIGNANT NEOPLASM OF BREAST IN FEMALE, UNSPECIFIED ESTROGEN RECEPTOR STATUS, UNSPECIFIED SITE OF BREAST (H): ICD-10-CM

## 2020-02-25 DIAGNOSIS — C50.911 BILATERAL MALIGNANT NEOPLASM OF BREAST IN FEMALE, UNSPECIFIED ESTROGEN RECEPTOR STATUS, UNSPECIFIED SITE OF BREAST (H): ICD-10-CM

## 2020-02-25 DIAGNOSIS — C50.919 METASTATIC BREAST CANCER: Primary | ICD-10-CM

## 2020-02-25 DIAGNOSIS — G47.00 INSOMNIA, UNSPECIFIED TYPE: ICD-10-CM

## 2020-02-25 LAB
ALBUMIN SERPL-MCNC: 3.4 G/DL (ref 3.4–5)
ALP SERPL-CCNC: 85 U/L (ref 40–150)
ALT SERPL W P-5'-P-CCNC: 25 U/L (ref 0–50)
ANION GAP SERPL CALCULATED.3IONS-SCNC: 7 MMOL/L (ref 3–14)
ANISOCYTOSIS BLD QL SMEAR: SLIGHT
AST SERPL W P-5'-P-CCNC: 20 U/L (ref 0–45)
BASOPHILS # BLD AUTO: 0.1 10E9/L (ref 0–0.2)
BASOPHILS NFR BLD AUTO: 2 %
BILIRUB SERPL-MCNC: 0.4 MG/DL (ref 0.2–1.3)
BUN SERPL-MCNC: 5 MG/DL (ref 7–30)
CALCIUM SERPL-MCNC: 8.3 MG/DL (ref 8.5–10.1)
CHLORIDE SERPL-SCNC: 103 MMOL/L (ref 94–109)
CO2 SERPL-SCNC: 27 MMOL/L (ref 20–32)
CREAT SERPL-MCNC: 0.52 MG/DL (ref 0.52–1.04)
DIFFERENTIAL METHOD BLD: ABNORMAL
ERYTHROCYTE [DISTWIDTH] IN BLOOD BY AUTOMATED COUNT: 13.7 % (ref 10–15)
GFR SERPL CREATININE-BSD FRML MDRD: >90 ML/MIN/{1.73_M2}
GLUCOSE SERPL-MCNC: 122 MG/DL (ref 70–99)
HCT VFR BLD AUTO: 36.1 % (ref 35–47)
HGB BLD-MCNC: 12.2 G/DL (ref 11.7–15.7)
LYMPHOCYTES # BLD AUTO: 0.7 10E9/L (ref 0.8–5.3)
LYMPHOCYTES NFR BLD AUTO: 24 %
MAGNESIUM SERPL-MCNC: 2 MG/DL (ref 1.6–2.3)
MCH RBC QN AUTO: 29 PG (ref 26.5–33)
MCHC RBC AUTO-ENTMCNC: 33.8 G/DL (ref 31.5–36.5)
MCV RBC AUTO: 86 FL (ref 78–100)
MONOCYTES # BLD AUTO: 0.1 10E9/L (ref 0–1.3)
MONOCYTES NFR BLD AUTO: 2 %
NEUTROPHILS # BLD AUTO: 1.9 10E9/L (ref 1.6–8.3)
NEUTROPHILS NFR BLD AUTO: 72 %
PLATELET # BLD AUTO: 200 10E9/L (ref 150–450)
PLATELET # BLD EST: ABNORMAL 10*3/UL
POTASSIUM SERPL-SCNC: 3.8 MMOL/L (ref 3.4–5.3)
PROT SERPL-MCNC: 6.2 G/DL (ref 6.8–8.8)
RBC # BLD AUTO: 4.21 10E12/L (ref 3.8–5.2)
SODIUM SERPL-SCNC: 137 MMOL/L (ref 133–144)
WBC # BLD AUTO: 2.8 10E9/L (ref 4–11)

## 2020-02-25 PROCEDURE — 83735 ASSAY OF MAGNESIUM: CPT | Performed by: INTERNAL MEDICINE

## 2020-02-25 PROCEDURE — 85025 COMPLETE CBC W/AUTO DIFF WBC: CPT | Performed by: INTERNAL MEDICINE

## 2020-02-25 PROCEDURE — 96409 CHEMO IV PUSH SNGL DRUG: CPT | Performed by: INTERNAL MEDICINE

## 2020-02-25 PROCEDURE — 96367 TX/PROPH/DG ADDL SEQ IV INF: CPT | Performed by: INTERNAL MEDICINE

## 2020-02-25 PROCEDURE — 80053 COMPREHEN METABOLIC PANEL: CPT | Performed by: INTERNAL MEDICINE

## 2020-02-25 PROCEDURE — 99207 ZZC NO CHARGE LOS: CPT

## 2020-02-25 PROCEDURE — 99207 ZZC NO CHARGE NURSE ONLY: CPT

## 2020-02-25 RX ORDER — HEPARIN SODIUM (PORCINE) LOCK FLUSH IV SOLN 100 UNIT/ML 100 UNIT/ML
5 SOLUTION INTRAVENOUS
Status: DISCONTINUED | OUTPATIENT
Start: 2020-02-25 | End: 2020-02-25 | Stop reason: HOSPADM

## 2020-02-25 RX ADMIN — HEPARIN SODIUM (PORCINE) LOCK FLUSH IV SOLN 100 UNIT/ML 5 ML: 100 SOLUTION at 09:41

## 2020-02-25 RX ADMIN — HEPARIN SODIUM (PORCINE) LOCK FLUSH IV SOLN 100 UNIT/ML 5 ML: 100 SOLUTION at 07:52

## 2020-02-25 RX ADMIN — Medication 250 ML: at 08:49

## 2020-02-25 ASSESSMENT — PAIN SCALES - GENERAL: PAINLEVEL: MODERATE PAIN (4)

## 2020-02-26 RX ORDER — TRAZODONE HYDROCHLORIDE 50 MG/1
50 TABLET, FILM COATED ORAL AT BEDTIME
Qty: 30 TABLET | Refills: 0 | Status: SHIPPED | OUTPATIENT
Start: 2020-02-26 | End: 2020-03-18

## 2020-03-10 ENCOUNTER — HEALTH MAINTENANCE LETTER (OUTPATIENT)
Age: 60
End: 2020-03-10

## 2020-03-18 ENCOUNTER — ONCOLOGY VISIT (OUTPATIENT)
Dept: ONCOLOGY | Facility: CLINIC | Age: 60
End: 2020-03-18
Payer: COMMERCIAL

## 2020-03-18 ENCOUNTER — INFUSION THERAPY VISIT (OUTPATIENT)
Dept: INFUSION THERAPY | Facility: CLINIC | Age: 60
End: 2020-03-18
Payer: COMMERCIAL

## 2020-03-18 VITALS
WEIGHT: 141.1 LBS | DIASTOLIC BLOOD PRESSURE: 87 MMHG | HEART RATE: 78 BPM | BODY MASS INDEX: 25.8 KG/M2 | OXYGEN SATURATION: 100 % | RESPIRATION RATE: 16 BRPM | TEMPERATURE: 98.2 F | SYSTOLIC BLOOD PRESSURE: 115 MMHG

## 2020-03-18 DIAGNOSIS — G47.00 INSOMNIA, UNSPECIFIED TYPE: ICD-10-CM

## 2020-03-18 DIAGNOSIS — C50.911 BILATERAL MALIGNANT NEOPLASM OF BREAST IN FEMALE, UNSPECIFIED ESTROGEN RECEPTOR STATUS, UNSPECIFIED SITE OF BREAST (H): ICD-10-CM

## 2020-03-18 DIAGNOSIS — C50.919 METASTATIC BREAST CANCER: ICD-10-CM

## 2020-03-18 DIAGNOSIS — C50.912 BILATERAL MALIGNANT NEOPLASM OF BREAST IN FEMALE, UNSPECIFIED ESTROGEN RECEPTOR STATUS, UNSPECIFIED SITE OF BREAST (H): ICD-10-CM

## 2020-03-18 DIAGNOSIS — C50.919 CARCINOMA OF BREAST METASTATIC TO BONE, UNSPECIFIED LATERALITY (H): Primary | ICD-10-CM

## 2020-03-18 DIAGNOSIS — C50.919 METASTATIC BREAST CANCER: Primary | ICD-10-CM

## 2020-03-18 DIAGNOSIS — M54.6 MIDLINE THORACIC BACK PAIN, UNSPECIFIED CHRONICITY: ICD-10-CM

## 2020-03-18 DIAGNOSIS — C79.51 CARCINOMA OF BREAST METASTATIC TO BONE, UNSPECIFIED LATERALITY (H): Primary | ICD-10-CM

## 2020-03-18 LAB
ALBUMIN SERPL-MCNC: 3.2 G/DL (ref 3.4–5)
ALP SERPL-CCNC: 93 U/L (ref 40–150)
ALT SERPL W P-5'-P-CCNC: 19 U/L (ref 0–50)
ANION GAP SERPL CALCULATED.3IONS-SCNC: 6 MMOL/L (ref 3–14)
AST SERPL W P-5'-P-CCNC: 20 U/L (ref 0–45)
BASOPHILS # BLD AUTO: 0 10E9/L (ref 0–0.2)
BASOPHILS NFR BLD AUTO: 0.9 %
BILIRUB SERPL-MCNC: 0.3 MG/DL (ref 0.2–1.3)
BUN SERPL-MCNC: 5 MG/DL (ref 7–30)
CALCIUM SERPL-MCNC: 8.5 MG/DL (ref 8.5–10.1)
CANCER AG27-29 SERPL-ACNC: 222 U/ML (ref 0–39)
CHLORIDE SERPL-SCNC: 107 MMOL/L (ref 94–109)
CO2 SERPL-SCNC: 25 MMOL/L (ref 20–32)
CREAT SERPL-MCNC: 0.6 MG/DL (ref 0.52–1.04)
DIFFERENTIAL METHOD BLD: ABNORMAL
EOSINOPHIL # BLD AUTO: 0 10E9/L (ref 0–0.7)
EOSINOPHIL NFR BLD AUTO: 0.6 %
ERYTHROCYTE [DISTWIDTH] IN BLOOD BY AUTOMATED COUNT: 14.7 % (ref 10–15)
GFR SERPL CREATININE-BSD FRML MDRD: >90 ML/MIN/{1.73_M2}
GLUCOSE SERPL-MCNC: 124 MG/DL (ref 70–99)
HCT VFR BLD AUTO: 40.5 % (ref 35–47)
HGB BLD-MCNC: 13 G/DL (ref 11.7–15.7)
IMM GRANULOCYTES # BLD: 0 10E9/L (ref 0–0.4)
IMM GRANULOCYTES NFR BLD: 0.9 %
LYMPHOCYTES # BLD AUTO: 0.5 10E9/L (ref 0.8–5.3)
LYMPHOCYTES NFR BLD AUTO: 14.2 %
MAGNESIUM SERPL-MCNC: 2.1 MG/DL (ref 1.6–2.3)
MCH RBC QN AUTO: 29.1 PG (ref 26.5–33)
MCHC RBC AUTO-ENTMCNC: 32.1 G/DL (ref 31.5–36.5)
MCV RBC AUTO: 91 FL (ref 78–100)
MONOCYTES # BLD AUTO: 0.4 10E9/L (ref 0–1.3)
MONOCYTES NFR BLD AUTO: 10.5 %
NEUTROPHILS # BLD AUTO: 2.6 10E9/L (ref 1.6–8.3)
NEUTROPHILS NFR BLD AUTO: 72.9 %
PLATELET # BLD AUTO: 176 10E9/L (ref 150–450)
POTASSIUM SERPL-SCNC: 3.8 MMOL/L (ref 3.4–5.3)
PROT SERPL-MCNC: 6.7 G/DL (ref 6.8–8.8)
RBC # BLD AUTO: 4.47 10E12/L (ref 3.8–5.2)
SODIUM SERPL-SCNC: 138 MMOL/L (ref 133–144)
WBC # BLD AUTO: 3.5 10E9/L (ref 4–11)

## 2020-03-18 PROCEDURE — 85025 COMPLETE CBC W/AUTO DIFF WBC: CPT | Performed by: INTERNAL MEDICINE

## 2020-03-18 PROCEDURE — 96409 CHEMO IV PUSH SNGL DRUG: CPT | Performed by: INTERNAL MEDICINE

## 2020-03-18 PROCEDURE — 83735 ASSAY OF MAGNESIUM: CPT | Performed by: INTERNAL MEDICINE

## 2020-03-18 PROCEDURE — 99207 ZZC NO CHARGE NURSE ONLY: CPT

## 2020-03-18 PROCEDURE — 80053 COMPREHEN METABOLIC PANEL: CPT | Performed by: INTERNAL MEDICINE

## 2020-03-18 PROCEDURE — 99214 OFFICE O/P EST MOD 30 MIN: CPT | Mod: 25 | Performed by: INTERNAL MEDICINE

## 2020-03-18 PROCEDURE — 96367 TX/PROPH/DG ADDL SEQ IV INF: CPT | Performed by: INTERNAL MEDICINE

## 2020-03-18 PROCEDURE — 86300 IMMUNOASSAY TUMOR CA 15-3: CPT | Performed by: INTERNAL MEDICINE

## 2020-03-18 PROCEDURE — 99207 ZZC NO CHARGE LOS: CPT

## 2020-03-18 RX ORDER — HEPARIN SODIUM (PORCINE) LOCK FLUSH IV SOLN 100 UNIT/ML 100 UNIT/ML
5 SOLUTION INTRAVENOUS
Status: CANCELLED | OUTPATIENT
Start: 2020-03-25

## 2020-03-18 RX ORDER — METHYLPREDNISOLONE SODIUM SUCCINATE 125 MG/2ML
125 INJECTION, POWDER, LYOPHILIZED, FOR SOLUTION INTRAMUSCULAR; INTRAVENOUS
Status: CANCELLED
Start: 2020-03-25

## 2020-03-18 RX ORDER — LORAZEPAM 2 MG/ML
0.5 INJECTION INTRAMUSCULAR EVERY 4 HOURS PRN
Status: CANCELLED
Start: 2020-03-25

## 2020-03-18 RX ORDER — MEPERIDINE HYDROCHLORIDE 25 MG/ML
25 INJECTION INTRAMUSCULAR; INTRAVENOUS; SUBCUTANEOUS EVERY 30 MIN PRN
Status: CANCELLED | OUTPATIENT
Start: 2020-03-25

## 2020-03-18 RX ORDER — NALOXONE HYDROCHLORIDE 0.4 MG/ML
.1-.4 INJECTION, SOLUTION INTRAMUSCULAR; INTRAVENOUS; SUBCUTANEOUS
Status: CANCELLED | OUTPATIENT
Start: 2020-03-18

## 2020-03-18 RX ORDER — MEPERIDINE HYDROCHLORIDE 25 MG/ML
25 INJECTION INTRAMUSCULAR; INTRAVENOUS; SUBCUTANEOUS EVERY 30 MIN PRN
Status: CANCELLED | OUTPATIENT
Start: 2020-03-18

## 2020-03-18 RX ORDER — NALOXONE HYDROCHLORIDE 0.4 MG/ML
.1-.4 INJECTION, SOLUTION INTRAMUSCULAR; INTRAVENOUS; SUBCUTANEOUS
Status: CANCELLED | OUTPATIENT
Start: 2020-03-25

## 2020-03-18 RX ORDER — HEPARIN SODIUM,PORCINE 10 UNIT/ML
5 VIAL (ML) INTRAVENOUS
Status: CANCELLED | OUTPATIENT
Start: 2020-03-18

## 2020-03-18 RX ORDER — ALBUTEROL SULFATE 90 UG/1
1-2 AEROSOL, METERED RESPIRATORY (INHALATION)
Status: CANCELLED
Start: 2020-03-18

## 2020-03-18 RX ORDER — SODIUM CHLORIDE 9 MG/ML
1000 INJECTION, SOLUTION INTRAVENOUS CONTINUOUS PRN
Status: CANCELLED
Start: 2020-03-25

## 2020-03-18 RX ORDER — SODIUM CHLORIDE 9 MG/ML
1000 INJECTION, SOLUTION INTRAVENOUS CONTINUOUS PRN
Status: CANCELLED
Start: 2020-03-18

## 2020-03-18 RX ORDER — EPINEPHRINE 1 MG/ML
0.3 INJECTION, SOLUTION INTRAMUSCULAR; SUBCUTANEOUS EVERY 5 MIN PRN
Status: CANCELLED | OUTPATIENT
Start: 2020-03-18

## 2020-03-18 RX ORDER — ALBUTEROL SULFATE 90 UG/1
1-2 AEROSOL, METERED RESPIRATORY (INHALATION)
Status: CANCELLED
Start: 2020-03-25

## 2020-03-18 RX ORDER — LORAZEPAM 2 MG/ML
0.5 INJECTION INTRAMUSCULAR EVERY 4 HOURS PRN
Status: CANCELLED
Start: 2020-03-18

## 2020-03-18 RX ORDER — EPINEPHRINE 0.3 MG/.3ML
0.3 INJECTION SUBCUTANEOUS EVERY 5 MIN PRN
Status: CANCELLED | OUTPATIENT
Start: 2020-03-25

## 2020-03-18 RX ORDER — ALBUTEROL SULFATE 0.83 MG/ML
2.5 SOLUTION RESPIRATORY (INHALATION)
Status: CANCELLED | OUTPATIENT
Start: 2020-03-18

## 2020-03-18 RX ORDER — EPINEPHRINE 0.3 MG/.3ML
0.3 INJECTION SUBCUTANEOUS EVERY 5 MIN PRN
Status: CANCELLED | OUTPATIENT
Start: 2020-03-18

## 2020-03-18 RX ORDER — HEPARIN SODIUM (PORCINE) LOCK FLUSH IV SOLN 100 UNIT/ML 100 UNIT/ML
5 SOLUTION INTRAVENOUS
Status: CANCELLED | OUTPATIENT
Start: 2020-03-18

## 2020-03-18 RX ORDER — EPINEPHRINE 1 MG/ML
0.3 INJECTION, SOLUTION INTRAMUSCULAR; SUBCUTANEOUS EVERY 5 MIN PRN
Status: CANCELLED | OUTPATIENT
Start: 2020-03-25

## 2020-03-18 RX ORDER — HEPARIN SODIUM,PORCINE 10 UNIT/ML
5 VIAL (ML) INTRAVENOUS
Status: CANCELLED | OUTPATIENT
Start: 2020-03-25

## 2020-03-18 RX ORDER — ALBUTEROL SULFATE 0.83 MG/ML
2.5 SOLUTION RESPIRATORY (INHALATION)
Status: CANCELLED | OUTPATIENT
Start: 2020-03-25

## 2020-03-18 RX ORDER — METHYLPREDNISOLONE SODIUM SUCCINATE 125 MG/2ML
125 INJECTION, POWDER, LYOPHILIZED, FOR SOLUTION INTRAMUSCULAR; INTRAVENOUS
Status: CANCELLED
Start: 2020-03-18

## 2020-03-18 RX ORDER — HEPARIN SODIUM (PORCINE) LOCK FLUSH IV SOLN 100 UNIT/ML 100 UNIT/ML
5 SOLUTION INTRAVENOUS
Status: DISCONTINUED | OUTPATIENT
Start: 2020-03-18 | End: 2020-03-18 | Stop reason: HOSPADM

## 2020-03-18 RX ORDER — DIPHENHYDRAMINE HYDROCHLORIDE 50 MG/ML
50 INJECTION INTRAMUSCULAR; INTRAVENOUS
Status: CANCELLED
Start: 2020-03-25

## 2020-03-18 RX ORDER — DIPHENHYDRAMINE HYDROCHLORIDE 50 MG/ML
50 INJECTION INTRAMUSCULAR; INTRAVENOUS
Status: CANCELLED
Start: 2020-03-18

## 2020-03-18 RX ORDER — OXYCODONE HYDROCHLORIDE 5 MG/1
5-10 TABLET ORAL EVERY 4 HOURS PRN
Qty: 100 TABLET | Refills: 0 | Status: SHIPPED | OUTPATIENT
Start: 2020-03-18 | End: 2020-03-26 | Stop reason: ALTCHOICE

## 2020-03-18 RX ORDER — TRAZODONE HYDROCHLORIDE 50 MG/1
50 TABLET, FILM COATED ORAL AT BEDTIME
Qty: 30 TABLET | Refills: 0 | Status: SHIPPED | OUTPATIENT
Start: 2020-03-18 | End: 2020-07-21

## 2020-03-18 RX ADMIN — HEPARIN SODIUM (PORCINE) LOCK FLUSH IV SOLN 100 UNIT/ML 5 ML: 100 SOLUTION at 09:31

## 2020-03-18 RX ADMIN — Medication 250 ML: at 08:47

## 2020-03-18 RX ADMIN — HEPARIN SODIUM (PORCINE) LOCK FLUSH IV SOLN 100 UNIT/ML 5 ML: 100 SOLUTION at 07:55

## 2020-03-18 ASSESSMENT — PAIN SCALES - GENERAL: PAINLEVEL: MILD PAIN (2)

## 2020-03-18 NOTE — NURSING NOTE
"Oncology Rooming Note    March 18, 2020 8:04 AM   Shaneka Alvarez is a 59 year old female who presents for:    Chief Complaint   Patient presents with     Oncology Clinic Visit     follow up prior to treatment     Initial Vitals: /87   Pulse 78   Temp 98.2  F (36.8  C) (Oral)   Resp 16   Wt 64 kg (141 lb 1.6 oz)   SpO2 100%   BMI 25.80 kg/m   Estimated body mass index is 25.8 kg/m  as calculated from the following:    Height as of 2/18/20: 1.575 m (5' 2.01\").    Weight as of this encounter: 64 kg (141 lb 1.6 oz). Body surface area is 1.67 meters squared.  Mild Pain (2) Comment: Data Unavailable   No LMP recorded. Patient is postmenopausal.  Allergies reviewed: Yes  Medications reviewed: Yes    Medications: MEDICATION REFILLS NEEDED TODAY. Provider was notified. Trazodone and oxycodone  Pharmacy name entered into [facility]:    [id number] - [place], [place] - [address] [id number] [place], [place] - [address] [address] [facility] [address] [place] [address] [facility] [address]  [facility] - [place], [place] - [address]    Clinical concerns: Back pain Dr Zepeda was notified.      ARIADNA KOO RN              "

## 2020-03-18 NOTE — PROGRESS NOTES
ONCOLOGY FOLLOW UP NOTE: 3/18/2020        I took the history from reviewing the previous notes that she was following with Dr. Amaya.  I have copied and updated from prior notes.    ONCOLOGY History:    1.  January 2006:  Diagnosed with Stage IIB, T2 N1 M0 invasive lobular carcinoma of the right breast.  Final pathology showed a 4.5 x 3.8 x 2.5 cm, 01/14 lymph nodes positive.  Estrogen, progesterone receptor positive, HER-2 negative.     2.  Genetics.  BRCA1 and 2 mutations not detected. 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       THERAPY TO DATE:   1.  2006:  ECOG 2104 protocol of dose-dense Adriamycin, Cytoxan and Avastin x4 cycles followed by Taxol and Avastin x4 cycles.   2.  03/2007:  Completed 1 year Avastin.   3.  11/2006-01/2007:  Radiotherapy to the right breast of 5040 cGy.   4.  03/20070103-4937:  Aromasin.  Stopped after moving to the [facility].   5.  01/2016:  Right modified radical mastectomy with latissimus dorsi flap reconstruction and a left prophylactic mastectomy with latissimus dorsi flap reconstruction.     She recently had MRI of the brain as a follow-up for multiple sclerosis and there were multiple calvarial lesions noted which was suspicious for metastatic disease.  There was no evidence of new multiple sclerosis lesions.  She had a PET scan on 8/30/2019 which showed widespread bone metastatic lesions (calvarium, spine, sacrum, pelvis, ribs most prominent at C7, T3, L1 and L4), hypermetabolic 3 cm lesion in LLL, and mediastinal/hilar LN. CEA wnl at 1.9 and C27-29 elevated to 415 (28 on 8/23/16). A CT guided biopsy of the right iliac bone was obtained on 9/4/19, pathology consistent with metastatic adenocarcinoma (breast), ER/MO negative, HER-2 negative PDL 1 < 1%. A second biopsy was take of the LLL nodule via EBUS on 9/5/19 did not show any malignancy.    C/T/L spine MRI 8/3/19 to 9/2/19 with numerous enhancing lesions of the C, T and L spine, largest around T9 and L1. No evidence  of cord compression. Has a L1 compression fracture and impending L4.     Received radiation T12-L5 3000 cGy in 10 fractions from 9/6/2019 till 9/18/2019.  Neurosurgery recommended no surgical intervention but to wear Gorge brace when out of bed and HOB >30 degrees    She started palliative Xeloda 2000/1500 on 10/1/2019 but after just a few doses she noticed nausea, red eyes blurred vision, loss of appetite.  She stopped taking it after 5 doses and was seen by nurse practitioner on 10/7/2019 when she was improving.  At that point she was restarted on Xeloda at a lower dose of 1000 mg twice a day.  As she was not able to tolerate even the lower dose with extreme nausea and vomiting and feeling extremely fatigued and blurry vision, we decided on stopping Xeloda.    We decided on repeating scans and MRI brain on 10/25/2019 showed no intracranial metastatic disease.   Multiple enhancing calvarial lesions may be increased in number and are suggestive of metastatic disease. Thinner imaging on the current study may identify the smaller lesions and may be responsible for  identified more lesions.   There is a single focus of T2 hyperintense signal within the anterior right temporal lobe may represent interval demyelination. There is no evidence for active demyelination.    PET/CT on 11/1/2019 showed favorable response to therapy and Overall FDG uptake within scattered osseous metastases is decreased since 8/30/2019, particularly within the pelvis. Increased sclerotic appearance of L1 and L4 pathologic compression deformities, likely sequela of recently completed palliative radiation therapy.    No significant FDG uptake in previously demonstrated hypermetabolic mediastinal lymph nodes. Biopsy negative on 9/5/2019.  There is also decreased FDG uptake in the left lower lobe (biopsy negative for  malignancy), now with dense consolidation containing air bronchograms suggestive of infection versus aspiration.  There is diffuse  FDG uptake within the esophagus, consistent with esophagitis.    Because of intolerance to Xeloda we decided on switching to weekly paclitaxel on 11/5/2019.    C#2 Taxol 12/4/19- started with 70mg/m2 dose reduction    C#3 Taxol 12/30/2019     PET/CT on 1/24/2020 showed progression of the disease in some of the bone lesions including increase in size and lytic lesion within the vertebral body of C4 measuring 1 cm as opposed to 0.6 cm previously and a new central soft tissue nodule with SUV max 4.1 when previously it was non-hypermetabolic.  There is also some progression noted in the right proximal femur and right iliac bone.  There is decreased metabolic uptake in the right lamina of T9 with SUV max 4.7 when previously it was 8.0.  Other lesions are stable.    CA 27-29 also had increased significantly and it was 257 on 1/28/2020.    We decided on switching to eribulin on 1/28/2020.    C#2 2/18/2020  C#2 D#8 2/25/2020    C#3 D#1 3/18/2020 -delayed by 1 week as per patient's preference because she wanted to visit her family.      I had also recommended starting Zometa so she got dental clearance to start with that.  She received Zometa on 10/23/2019 and 1/14/2020.  We plan to give it every 3 months.    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  It was thought that this could be due to Taxol as patient reported to the ophthalmologist that she was on Taxol in September 2019 when her symptoms started.  But she was not on Taxol in September 2019 when she started noticing the visual changes so Taxol is not responsible for this.  The first dose of Taxol was on 11/5/2019.         She comes in today and tells me that she has been tolerating the chemotherapy really well.  She has not noticed any significant nausea vomiting diarrhea or constipation.  Overall energy is the same.  She mentions that she ran out of oxycodone and is noticing more pain in her mid and lower back.  She mentioned  "that when she was taking the oxycodone it was only helping for a few hours and she was taking it once or twice a day.  Currently she is taking Tylenol and over-the-counter pain patch.  Denies new pain.  Overall neuropathy is the same involving her fingers and this has not worsened with the start of eribulin.  No infections.  No shortness of breath.  No new swellings.    She has baseline balance issues and heat sensitivity and weakness in the legs from multiple sclerosis.      ECOG 1    ROS:  A comprehensive ROS was otherwise neg      I reviewed other history in epic as below.       PAST MEDICAL HISTORY:     1.  Breast cancer  2.  Multiple sclerosis.   3.  Depression.   4.  Migraines.   5.  Hypertension.   6.  Cholecystectomy and umbilical hernia repair.     SOCIAL HISTORY: She smoked for 5 years but quit many years ago.  Drinks alcohol socially.  She lives with her .  She is a teacher in middle school.       FAMILY HISTORY: She mentions that her mother had breast cancer at 49.  Both grandmothers had breast cancer but she is not sure of the age.  A couple of cousins have cancers.  Patient has 3 children who are healthy.    Current Outpatient Medications   Medication     acetaminophen (TYLENOL) 500 MG tablet     busPIRone (BUSPAR) 15 MG tablet     calcium citrate-vitamin D (CITRACAL) 315-250 MG-UNIT TABS     Cholecalciferol (VITAMIN D3 PO)     diclofenac (VOLTAREN) 1 % topical gel     LORazepam (ATIVAN) 0.5 MG tablet     magnesium 250 MG tablet     naloxone (NARCAN) 1 mg/mL for intranasal kit (2 syringes with 2 mucosal atomizer device)     ondansetron (ZOFRAN-ODT) 8 MG ODT tab     oxyCODONE (ROXICODONE) 5 MG tablet     polyethylene glycol (MIRALAX/GLYCOLAX) packet     prochlorperazine (COMPAZINE) 10 MG tablet     propranolol (INDERAL LA) 60 MG 24 hr capsule     senna-docusate (SENOKOT-S/PERICOLACE) 8.6-50 MG tablet     syringe/needle, disp, (B-D INTEGRA SYRINGE) 23G X 1\" 3 ML MISC     traZODone (DESYREL) 50 MG " tablet     venlafaxine (EFFEXOR-ER) 225 MG TB24 24 hr tablet     No current facility-administered medications for this visit.      Facility-Administered Medications Ordered in Other Visits   Medication     heparin 100 UNIT/ML injection 5 mL     sodium chloride (PF) 0.9% PF flush 10-20 mL          Allergies   Allergen Reactions     Bactrim [Sulfamethoxazole W/Trimethoprim]      Internal bleeding     Copaxone [Glatiramer] Hives          PHYSICAL EXAMINATION:   /87   Pulse 78   Temp 98.2  F (36.8  C) (Oral)   Resp 16   Wt 64 kg (141 lb 1.6 oz)   SpO2 100%   BMI 25.80 kg/m    Wt Readings from Last 4 Encounters:   03/18/20 64 kg (141 lb 1.6 oz)   02/25/20 65.6 kg (144 lb 9.6 oz)   02/18/20 64.8 kg (142 lb 14.4 oz)   02/04/20 65.9 kg (145 lb 4.8 oz)     CONSTITUTIONAL: no acute distress  EYES: PERRLA, no palor or icterus.   ENT/MOUTH: no mouth lesions. Ears normal  CVS: s1s2 no m r g .   RESPIRATORY: clear to auscultation b/l  GI: soft non tender no hepatosplenomegaly  NEURO: AAOX3  Grossly non focal neuro exam  INTEGUMENT: no obvious rashes  LYMPHATIC: no palpable cervical, supraclavicular, axillary or inguinal LAD  MUSCULOSKELETAL: She has tenderness to palpation in the mid thoracic region as well as lumbar spine region.  EXTREMITIES: no edema  PSYCH: Mentation, mood and affect are normal. Decision making capacity is intact        Labs and imaging reviewed.    Results for KYLE GU (MRN [id number]) as of 3/18/2020 08:24   Ref. Range 3/18/2020 07:45   Sodium Latest Ref Range: 133 - 144 mmol/L 138   Potassium Latest Ref Range: 3.4 - 5.3 mmol/L 3.8   Chloride Latest Ref Range: 94 - 109 mmol/L 107   Carbon Dioxide Latest Ref Range: 20 - 32 mmol/L 25   Urea Nitrogen Latest Ref Range: 7 - 30 mg/dL 5 (L)   Creatinine Latest Ref Range: 0.52 - 1.04 mg/dL 0.60   GFR Estimate Latest Ref Range: >60 mL/min/1.73_m2 >90   GFR Estimate If Black Latest Ref Range: >60 mL/min/1.73_m2 >90   Calcium Latest Ref Range: 8.5 -  10.1 mg/dL 8.5   Anion Gap Latest Ref Range: 3 - 14 mmol/L 6   Magnesium Latest Ref Range: 1.6 - 2.3 mg/dL 2.1   Albumin Latest Ref Range: 3.4 - 5.0 g/dL 3.2 (L)   Protein Total Latest Ref Range: 6.8 - 8.8 g/dL 6.7 (L)   Bilirubin Total Latest Ref Range: 0.2 - 1.3 mg/dL 0.3   Alkaline Phosphatase Latest Ref Range: 40 - 150 U/L 93   ALT Latest Ref Range: 0 - 50 U/L 19   AST Latest Ref Range: 0 - 45 U/L 20   Glucose Latest Ref Range: 70 - 99 mg/dL 124 (H)   WBC Latest Ref Range: 4.0 - 11.0 10e9/L 3.5 (L)   Hemoglobin Latest Ref Range: 11.7 - 15.7 g/dL 13.0   Hematocrit Latest Ref Range: 35.0 - 47.0 % 40.5   Platelet Count Latest Ref Range: 150 - 450 10e9/L 176   RBC Count Latest Ref Range: 3.8 - 5.2 10e12/L 4.47   MCV Latest Ref Range: 78 - 100 fl 91   MCH Latest Ref Range: 26.5 - 33.0 pg 29.1   MCHC Latest Ref Range: 31.5 - 36.5 g/dL 32.1   RDW Latest Ref Range: 10.0 - 15.0 % 14.7   Diff Method Unknown Automated Method   % Neutrophils Latest Units: % 72.9   % Lymphocytes Latest Units: % 14.2   % Monocytes Latest Units: % 10.5   % Eosinophils Latest Units: % 0.6   % Basophils Latest Units: % 0.9   % Immature Granulocytes Latest Units: % 0.9   Absolute Neutrophil Latest Ref Range: 1.6 - 8.3 10e9/L 2.6   Absolute Lymphocytes Latest Ref Range: 0.8 - 5.3 10e9/L 0.5 (L)   Absolute Monocytes Latest Ref Range: 0.0 - 1.3 10e9/L 0.4   Absolute Eosinophils Latest Ref Range: 0.0 - 0.7 10e9/L 0.0   Absolute Basophils Latest Ref Range: 0.0 - 0.2 10e9/L 0.0   Abs Immature Granulocytes Latest Ref Range: 0 - 0.4 10e9/L 0.0       MR CERVICAL SPINE W/O & W CONTRAST 1/31/2020 11:04 AM     Provided History: left arm tingling- hx of breast cancer with spine  mets; Carcinoma of breast metastatic to bone, unspecified laterality  (H); Carcinoma of breast metastatic to bone, unspecified laterality  (H); Numbness and tingling in left arm; Numbness and tingling in left  arm  ICD-10: Carcinoma of breast metastatic to bone, unspecified  laterality  (H); Carcinoma of breast metastatic to bone, unspecified laterality  (H); Numbness and tingling in left arm; Numbness and tingling in left  arm     Comparison: 19 2019     Technique: Sagittal T1-weighted, sagittal T2-weighted, sagittal  diffusion weighted, axial T2-weighted, and axial T2* gradient echo  images of the cervical spine were obtained without intravenous  contrast. Following intravenous administration of gadolinium, axial  and sagittal T1-weighted images with fat saturation were also  obtained.     Contrast: 7 ml Gadavist     Findings:  The cervical vertebrae are normally aligned. Mild straightening of the  normal cervical lordosis. There is no disc height narrowing at any  level.  There is normal signal within and normal contour of the  cervical spinal cord.     There are multiple enhancing foci within the cervical vertebral bodies  and posterior elements which have increased in size since the prior  study. For example the lesion in C7 is mildly increased measuring 11 x  10 mm where this previously measured 10 x 9 mm. The lesion in C4 now  measures 9 x 9 mm were this previously measured 5 x 5 mm. There are  lesions in the vertebral bodies of C2, C4, C6, C7, T1, T2, T3 and the  posterior elements of C3, C5 and C6. There is no epidural or neural  foraminal involvement. The degenerative changes in the cervical spine  are stable.                                                                      Impression:   1. Mild progression of the lesions in the cervical and upper thoracic  spine compared to the prior. No epidural or neural foraminal  involvement is seen..  2. Mild cervical spondylosis, unchanged. No significant spinal canal  stenosis or neural foraminal narrowing.    Combined Report of:    PET and CT on  1/24/2020 11:40 AM :     1. PET of the neck, chest, abdomen, and pelvis.  2. PET CT Fusion for Attenuation Correction and Anatomical  Localization:    3. Diagnostic CT scan of the chest,  abdomen, and pelvis with  intravenous contrast for interpretation.  3. CT of the chest, abdomen and pelvis obtained for diagnostic  interpretation.  4. 3D MIP and PET-CT fused images were processed on an independent  workstation and archived to PACS and reviewed by a radiologist.     Technique:     1. PET: The patient received 12.41 mCi of F-18-FDG; the serum glucose  was 110 prior to administration, body weight was 65.3 kg. Images were  evaluated in the axial, sagittal, and coronal planes as well as the  rotational whole body MIP. Images were acquired from the Vertex to the  Feet.     UPTAKE WAS MEASURED AT 60 MINUTES.      BACKGROUND:  Liver SUV max= 4.2,   Aorta Blood SUV Max: 3.2.      2. CT: Volumetric acquisition for clinical interpretation of the  chest, abdomen, and pelvis acquired at 3 mm sections . The chest,  abdomen, and pelvis were evaluated at 5 mm sections in bone, soft  tissue, and lung windows.       The patient received 85 cc. Of Isovue 370 intravenously for the  examination.    --     3. 3D MIP and PET-CT fused images were processed on an independent  workstation and archived to PACS and reviewed by a radiologist.     INDICATION: follow up metastatic breast cancer to bones; Metastatic  breast cancer (H); Bone metastases (H)     ADDITIONAL INFORMATION OBTAINED FROM EMR: History of invasive lobular  carcinoma of the right breast. Diagnosed in 2006. Status post  radiation therapy to the right breast with modified radical mastectomy  and latissimus dorsi flap reconstruction. Prophylactic left mastectomy  with similar reconstruction. Brain MRI with multiple cavitary lesions  suspicious for metastatic disease. Status post radiation therapy from  T12 to L5 for metastatic lesions. Started on palliative Xeloda and  October 2019. Was intolerant of Xeloda and switched to paclitaxel on  11/5/2019     COMPARISON: PET CT 11/1/2019     FINDINGS:      HEAD/NECK:  There is no  suspicious FDG uptake in the neck.       Mucosal thickening in the right maxillary sinus.. The mastoid air  cells are clear.      The mucosal pharyngeal space, the , prevertebral and carotid  spaces are within normal limits.      No masses, mass effect or pathologically enlarged lymph nodes are  evident. The thyroid gland is normal.     CHEST:  There is no suspicious FDG uptake in the chest.      There are no pathologically enlarged mediastinal, hilar or axillary  lymph nodes. Calcified subcarinal lymph nodes.  There are no suspicious lung nodules or evidence for infection.   Resolution of previous left lower lobe consolidation.  Left-sided  Port-A-Cath with tip terminating in the superior cavoatrial junction.     There is no significant pericardial or plural effusions.     Postoperative changes of bilateral mastectomy with implant  reconstruction.     ABDOMEN AND PELVIS:  There is no suspicious FDG uptake in the abdomen or pelvis.     There are no suspicious hepatic lesions. Focal hypoattenuation along  the falciform ligament consistent with focal fatty infiltration. There  is no splenomegaly or evidence for splenic or pancreatic mass lesion.     There are no suspicious adrenal mass lesions. Cholecystectomy clips.  There is symmetric nephrographic renal phase without hydronephrosis.  There is a 3 mm nonobstructing calyceal tip stone in the inferior pole  of the left kidney. Surgical clips in the left pelvis, stable.     There is no evidence for diverticulitis, bowel obstruction or free  fluid.     LOWER EXTREMITIES:   No abnormal masses or hypermetabolic lesions.     BONES:      Multiple lytic lesions including but not limited to:  -Lytic lesions in the right parietal bone. This is not hypermetabolic,  stable.  -Increase in size in lytic lesion within the vertebral body of C4  measuring 1.0 cm, previously 0.6 cm. There is a new central soft  tissue nodule. Maximal SUV of 4.1, previously nonhypermetabolic.  -Right proximal femur measuring  1.0 cm, previously 0.8 cm. In maximal  SUV of 3.9, unchanged.  -Right anterior iliac wing measuring 1.8 cm in length with maximal SUV  of 3.7, stable.  -Right superior iliac wing measuring 2.2 cm in length with maximal SUV  of 4.0, stable.  - Right iliac bone adjacent to the enterogastric and sacroiliac joint  max SUV 5.8, prior 3.5.  -Right lateral second rib with maximal SUV of 3.4, stable.  -Right lamina of T9 with maximal SUV of 4.7, previously 8.0  - Right 10th rib hypermetabolic unchanged.  Old bilateral rib fractures. Stable compression fracture of L1 with  approximately 50% height loss. Stable compression fracture of L4 with  approximately 10% height loss. Mild degenerative changes of the spine.                                                                         IMPRESSION: In this patient with history of breast cancer status post  multiple treatments and radiation therapy:  1. Overall progression as evidence by:  a. Increase in size and hypermetabolic activity of lesions in C4  vertebral body, right femoral bone and right iliac bone lesion  adjacent to the sacroiliac joint.  b. Decrease in hypermetabolic activity of lesion in the right T9  lamina.  (This was likely above the radiation field T12-L5)  c. Multiple other stable metastatic osseous lesions.  d. No other findings suspicious for metastatic disease.  2. Resolution of previous left lower lobe consolidation, presumably  atelectasis or infection.      ASSESSMENT AND PLAN:   1.  History of stage IIB, T2 N1 M0 invasive lobular carcinoma of the right breast.  It was initially diagnosed in 2006 and at that time it was hormone receptor positive, HER-2 negative.  She was treated on ECOG 2104 protocol with dose-dense Adriamycin, Cytoxan and Avastin x4 cycles followed by Taxol and Avastin x4 cycles followed by a Avastin for 1 year.  She also received radiation to the right breast which she completed in January 2007 (5040 cGy).  She took Aromasin from 2007  to 2014.    Now she has metastatic breast cancer with extensive involvement of the bones.  There is also a left lower lobe hypermetabolic lesion and mediastinal lymph nodes which are hypermetabolic.  The biopsy from the right iliac bone is consistent with metastatic lobular breast cancer but it is hormone receptor and HER-2 negative and PDL 1 is also negative.    She has received 3000 cGy of palliative radiation to T12-L5 from 9/6/2019 till 9/18/2019.    She was started on palliative Xeloda but she was unable to tolerate even the dose reduced medication.        We decided on repeating scans and MRI brain on 10/25/2019 showed no intracranial metastatic disease.   Multiple enhancing calvarial lesions may be increased in number and are suggestive of metastatic disease. Thinner imaging on the current study may identify the smaller lesions and may be responsible for identified more lesions.   There is a single focus of T2 hyperintense signal within the anterior right temporal lobe may represent interval demyelination. There is no evidence for active demyelination.    PET/CT on 11/1/2019 showed favorable response to therapy and Overall FDG uptake within scattered osseous metastases is decreased since 8/30/2019, particularly within the pelvis. Increased sclerotic appearance of L1 and L4 pathologic compression deformities, likely sequela of recently completed palliative radiation therapy.    No significant FDG uptake in previously demonstrated hypermetabolic mediastinal lymph nodes. Biopsy negative on 9/5/2019.  There is also decreased FDG uptake in the left lower lobe (biopsy negative for malignancy), now with dense consolidation containing air bronchograms suggestive of infection versus aspiration.  There is diffuse FDG uptake within the esophagus, consistent with esophagitis.    Because of intolerance to Xeloda we decided on switching to weekly paclitaxel on 11/5/2019.    For better tolerance we decreased the dose of  paclitaxel to 70 mg/m  with cycle #2.  She has completed 3 cycles of Taxol and the last chemotherapy was on 1/14/2020.      PET/CT on 1/24/2020 showed progression of the disease in some of the bone lesions including increase in size and lytic lesion within the vertebral body of C4 measuring 1 cm as opposed to 0.6 cm previously and a new central soft tissue nodule with SUV max 4.1 when previously it was non-hypermetabolic.  There is also some progression noted in the right proximal femur and right iliac bone.  There is decreased metabolic uptake in the right lamina of T9 with SUV max 4.7 when previously it was 8.0.  Other lesions are stable.    There are no pathologically enlarged mediastinal, hilar or axillary lymph nodes. Calcified subcarinal lymph nodes. There are no suspicious lung nodules or evidence for infection and previous left lower lobe consolidation has resolved.     CA 27-29 also had increased significantly and it was 257 on 1/28/2020.    Due to progression we switched to eribulin on 1/28/2020.    She is tolerating the chemotherapy well.  We will proceed with cycle #3 today.  Cycle #3 was delayed by 1 week because she wanted to visit her family.  CA 27-29 was rising when last checked.  It is pending from today.  I recommend repeating scans after completing cycle #3.      Pain management.  This is from the widely metastatic breast cancer to the bones.  She is noticing more pain in the mid and lower back.  She ran out of oxycodone.  I gave her a refill of oxycodone and she will follow-up with palliative care.    Previously she got palliative radiation to T12-L5 3000 cGy in 10 fractions from 9/6/2019 till 9/18/2019.  She was evaluated by orthopedics Dr. Bipin Elliott on 11/1/2019 and conservative management was recommended.   Because currently she is also noticing more pain in the mid back/mid thoracic region, I recommend repeating MRI of the thoracic spine.     Bone metastasis.  She received Zometa on  10/23/2019 and 01/14/2020.  She will get it every 3 months.  She will continue to take vitamin D and calcium.        Leukopenia/lymphocytopenia.  WBC is 3.5.  Lymphocyte count is low.  Neutrophil count is normal.  Continue same dose of chemotherapy.  Continue to keep a watch on blood counts.     Nausea and vomiting.  Dexamethasone and Emend as premedication are working well.  She will take Compazine and lorazepam as needed.      Insomnia.  Trazodone is helping and I refilled it today.        We did not address the following today.      Vision changes.    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  It was thought that this could be due to Taxol as patient reported to the ophthalmologist that she was on Taxol in September 2019 when her symptoms started.  But she was not on Taxol in September 2019 when she started noticing the visual changes so Taxol is not responsible for this.  The first dose of Taxol was on 11/5/2019.   That being said we have now switched the treatment to eribulin.  Continue to follow with ophthalmology.  She was told that the vision would take several weeks to improve.      Fatigue.  This is from the cancer and chemotherapy.  I again encouraged her to do regular exercise.  Continue to follow with cancer rehab.        Discussion regarding healthcare directives.  On previous visit she told me that she will bring the health care directive for our records but she forgot so I told her to bring it on next visit.      Breast implant removal.  She tells me that she was planning to do breast implant removal because there was a recall on this.  Her surgery was scheduled for 9/24/2019.  Because of this new development of metastatic breast cancer and her recent radiation, surgery has been canceled.  We discussed that at this time treatment for metastatic breast cancer would take precedence over the other surgery as the other surgery would require her to be off chemotherapy  for several weeks and in that case the chances of cancer progression would be high and I would not recommend that.    Multiple sclerosis.  She will continue to follow with her neurologist Dr. Quiles.  Currently multiple sclerosis is under control and currently she is not taking any medications.     I will see her back in 3 weeks.    All questions answered.  She is agreeable and comfortable with the plan.        Dewayne Zepeda MD

## 2020-03-18 NOTE — LETTER
3/18/2020         RE: Shaneka Alvarez  [id number] [facility] [address]        Dear Colleague,    Thank you for referring your patient, Shaneka Alvarez, to the [facility]. Please see a copy of my visit note below.    ONCOLOGY FOLLOW UP NOTE: 3/18/2020        I took the history from reviewing the previous notes that she was following with Dr. Amaya.  I have copied and updated from prior notes.    ONCOLOGY History:    1.  January 2006:  Diagnosed with Stage IIB, T2 N1 M0 invasive lobular carcinoma of the right breast.  Final pathology showed a 4.5 x 3.8 x 2.5 cm, 01/14 lymph nodes positive.  Estrogen, progesterone receptor positive, HER-2 negative.     2.  Genetics.  BRCA1 and 2 mutations not detected. 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       THERAPY TO DATE:   1. 2006:  ECOG 2104 protocol of dose-dense Adriamycin, Cytoxan and Avastin x4 cycles followed by Taxol and Avastin x4 cycles.   2.  03/2007:  Completed 1 year Avastin.   3.  11/2006-01/2007:  Radiotherapy to the right breast of 5040 cGy.   4.  03/[id number]:  Aromasin.  Stopped after moving to the [facility].   5.  01/2016:  Right modified radical mastectomy with latissimus dorsi flap reconstruction and a left prophylactic mastectomy with latissimus dorsi flap reconstruction.     She recently had MRI of the brain as a follow-up for multiple sclerosis and there were multiple calvarial lesions noted which was suspicious for metastatic disease.  There was no evidence of new multiple sclerosis lesions.  She had a PET scan on 8/30/2019 which showed widespread bone metastatic lesions (calvarium, spine, sacrum, pelvis, ribs most prominent at C7, T3, L1 and L4), hypermetabolic 3 cm lesion in LLL, and mediastinal/hilar LN. CEA wnl at 1.9 and C27-29 elevated to 415 (28 on 8/23/16). A CT guided biopsy of the right iliac bone was obtained on 9/4/19, pathology consistent with metastatic adenocarcinoma (breast), ER/CO negative, HER-2  negative PDL 1 < 1%. A second biopsy was take of the LLL nodule via EBUS on 9/5/19 did not show any malignancy.    C/T/L spine MRI 8/3/19 to 9/2/19 with numerous enhancing lesions of the C, T and L spine, largest around T9 and L1. No evidence of cord compression. Has a L1 compression fracture and impending L4.     Received radiation T12-L5 3000 cGy in 10 fractions from 9/6/2019 till 9/18/2019.  Neurosurgery recommended no surgical intervention but to wear Aurora brace when out of bed and HOB >30 degrees    She started palliative Xeloda 2000/1500 on 10/1/2019 but after just a few doses she noticed nausea, red eyes blurred vision, loss of appetite.  She stopped taking it after 5 doses and was seen by nurse practitioner on 10/7/2019 when she was improving.  At that point she was restarted on Xeloda at a lower dose of 1000 mg twice a day.  As she was not able to tolerate even the lower dose with extreme nausea and vomiting and feeling extremely fatigued and blurry vision, we decided on stopping Xeloda.    We decided on repeating scans and MRI brain on 10/25/2019 showed no intracranial metastatic disease.   Multiple enhancing calvarial lesions may be increased in number and are suggestive of metastatic disease. Thinner imaging on the current study may identify the smaller lesions and may be responsible for  identified more lesions.   There is a single focus of T2 hyperintense signal within the anterior right temporal lobe may represent interval demyelination. There is no evidence for active demyelination.    PET/CT on 11/1/2019 showed favorable response to therapy and Overall FDG uptake within scattered osseous metastases is decreased since 8/30/2019, particularly within the pelvis. Increased sclerotic appearance of L1 and L4 pathologic compression deformities, likely sequela of recently completed palliative radiation therapy.    No significant FDG uptake in previously demonstrated hypermetabolic mediastinal lymph nodes.  Biopsy negative on 9/5/2019.  There is also decreased FDG uptake in the left lower lobe (biopsy negative for  malignancy), now with dense consolidation containing air bronchograms suggestive of infection versus aspiration.  There is diffuse FDG uptake within the esophagus, consistent with esophagitis.    Because of intolerance to Xeloda we decided on switching to weekly paclitaxel on 11/5/2019.    C#2 Taxol 12/4/19- started with 70mg/m2 dose reduction    C#3 Taxol 12/30/2019     PET/CT on 1/24/2020 showed progression of the disease in some of the bone lesions including increase in size and lytic lesion within the vertebral body of C4 measuring 1 cm as opposed to 0.6 cm previously and a new central soft tissue nodule with SUV max 4.1 when previously it was non-hypermetabolic.  There is also some progression noted in the right proximal femur and right iliac bone.  There is decreased metabolic uptake in the right lamina of T9 with SUV max 4.7 when previously it was 8.0.  Other lesions are stable.    CA 27-29 also had increased significantly and it was 257 on 1/28/2020.    We decided on switching to eribulin on 1/28/2020.    C#2 2/18/2020  C#2 D#8 2/25/2020    C#3 D#1 3/18/2020 -delayed by 1 week as per patient's preference because she wanted to visit her family.      I had also recommended starting Zometa so she got dental clearance to start with that.  She received Zometa on 10/23/2019 and 1/14/2020.  We plan to give it every 3 months.    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  It was thought that this could be due to Taxol as patient reported to the ophthalmologist that she was on Taxol in September 2019 when her symptoms started.  But she was not on Taxol in September 2019 when she started noticing the visual changes so Taxol is not responsible for this.  The first dose of Taxol was on 11/5/2019.         She comes in today and tells me that she has been tolerating the  chemotherapy really well.  She has not noticed any significant nausea vomiting diarrhea or constipation.  Overall energy is the same.  She mentions that she ran out of oxycodone and is noticing more pain in her mid and lower back.  She mentioned that when she was taking the oxycodone it was only helping for a few hours and she was taking it once or twice a day.  Currently she is taking Tylenol and over-the-counter pain patch.  Denies new pain.  Overall neuropathy is the same involving her fingers and this has not worsened with the start of eribulin.  No infections.  No shortness of breath.  No new swellings.    She has baseline balance issues and heat sensitivity and weakness in the legs from multiple sclerosis.      ECOG 1    ROS:  A comprehensive ROS was otherwise neg      I reviewed other history in epic as below.       PAST MEDICAL HISTORY:     1.  Breast cancer  2.  Multiple sclerosis.   3.  Depression.   4.  Migraines.   5.  Hypertension.   6.  Cholecystectomy and umbilical hernia repair.     SOCIAL HISTORY: She smoked for 5 years but quit many years ago.  Drinks alcohol socially.  She lives with her .  She is a teacher in middle school.       FAMILY HISTORY: She mentions that her mother had breast cancer at 49.  Both grandmothers had breast cancer but she is not sure of the age.  A couple of cousins have cancers.  Patient has 3 children who are healthy.    Current Outpatient Medications   Medication     acetaminophen (TYLENOL) 500 MG tablet     busPIRone (BUSPAR) 15 MG tablet     calcium citrate-vitamin D (CITRACAL) 315-250 MG-UNIT TABS     Cholecalciferol (VITAMIN D3 PO)     diclofenac (VOLTAREN) 1 % topical gel     LORazepam (ATIVAN) 0.5 MG tablet     magnesium 250 MG tablet     naloxone (NARCAN) 1 mg/mL for intranasal kit (2 syringes with 2 mucosal atomizer device)     ondansetron (ZOFRAN-ODT) 8 MG ODT tab     oxyCODONE (ROXICODONE) 5 MG tablet     polyethylene glycol (MIRALAX/GLYCOLAX) packet      "prochlorperazine (COMPAZINE) 10 MG tablet     propranolol (INDERAL LA) 60 MG 24 hr capsule     senna-docusate (SENOKOT-S/PERICOLACE) 8.6-50 MG tablet     syringe/needle, disp, (B-D INTEGRA SYRINGE) 23G X 1\" 3 ML MISC     traZODone (DESYREL) 50 MG tablet     venlafaxine (EFFEXOR-ER) 225 MG TB24 24 hr tablet     No current facility-administered medications for this visit.      Facility-Administered Medications Ordered in Other Visits   Medication     heparin 100 UNIT/ML injection 5 mL     sodium chloride (PF) 0.9% PF flush 10-20 mL          Allergies   Allergen Reactions     Bactrim [Sulfamethoxazole W/Trimethoprim]      Internal bleeding     Copaxone [Glatiramer] Hives          PHYSICAL EXAMINATION:   /87   Pulse 78   Temp 98.2  F (36.8  C) (Oral)   Resp 16   Wt 64 kg (141 lb 1.6 oz)   SpO2 100%   BMI 25.80 kg/m    Wt Readings from Last 4 Encounters:   03/18/20 64 kg (141 lb 1.6 oz)   02/25/20 65.6 kg (144 lb 9.6 oz)   02/18/20 64.8 kg (142 lb 14.4 oz)   02/04/20 65.9 kg (145 lb 4.8 oz)     CONSTITUTIONAL: no acute distress  EYES: PERRLA, no palor or icterus.   ENT/MOUTH: no mouth lesions. Ears normal  CVS: s1s2 no m r g .   RESPIRATORY: clear to auscultation b/l  GI: soft non tender no hepatosplenomegaly  NEURO: AAOX3  Grossly non focal neuro exam  INTEGUMENT: no obvious rashes  LYMPHATIC: no palpable cervical, supraclavicular, axillary or inguinal LAD  MUSCULOSKELETAL: She has tenderness to palpation in the mid thoracic region as well as lumbar spine region.  EXTREMITIES: no edema  PSYCH: Mentation, mood and affect are normal. Decision making capacity is intact        Labs and imaging reviewed.    Results for KYLE GU (MRN [id number]) as of 3/18/2020 08:24   Ref. Range 3/18/2020 07:45   Sodium Latest Ref Range: 133 - 144 mmol/L 138   Potassium Latest Ref Range: 3.4 - 5.3 mmol/L 3.8   Chloride Latest Ref Range: 94 - 109 mmol/L 107   Carbon Dioxide Latest Ref Range: 20 - 32 mmol/L 25   Urea " Nitrogen Latest Ref Range: 7 - 30 mg/dL 5 (L)   Creatinine Latest Ref Range: 0.52 - 1.04 mg/dL 0.60   GFR Estimate Latest Ref Range: >60 mL/min/1.73_m2 >90   GFR Estimate If Black Latest Ref Range: >60 mL/min/1.73_m2 >90   Calcium Latest Ref Range: 8.5 - 10.1 mg/dL 8.5   Anion Gap Latest Ref Range: 3 - 14 mmol/L 6   Magnesium Latest Ref Range: 1.6 - 2.3 mg/dL 2.1   Albumin Latest Ref Range: 3.4 - 5.0 g/dL 3.2 (L)   Protein Total Latest Ref Range: 6.8 - 8.8 g/dL 6.7 (L)   Bilirubin Total Latest Ref Range: 0.2 - 1.3 mg/dL 0.3   Alkaline Phosphatase Latest Ref Range: 40 - 150 U/L 93   ALT Latest Ref Range: 0 - 50 U/L 19   AST Latest Ref Range: 0 - 45 U/L 20   Glucose Latest Ref Range: 70 - 99 mg/dL 124 (H)   WBC Latest Ref Range: 4.0 - 11.0 10e9/L 3.5 (L)   Hemoglobin Latest Ref Range: 11.7 - 15.7 g/dL 13.0   Hematocrit Latest Ref Range: 35.0 - 47.0 % 40.5   Platelet Count Latest Ref Range: 150 - 450 10e9/L 176   RBC Count Latest Ref Range: 3.8 - 5.2 10e12/L 4.47   MCV Latest Ref Range: 78 - 100 fl 91   MCH Latest Ref Range: 26.5 - 33.0 pg 29.1   MCHC Latest Ref Range: 31.5 - 36.5 g/dL 32.1   RDW Latest Ref Range: 10.0 - 15.0 % 14.7   Diff Method Unknown Automated Method   % Neutrophils Latest Units: % 72.9   % Lymphocytes Latest Units: % 14.2   % Monocytes Latest Units: % 10.5   % Eosinophils Latest Units: % 0.6   % Basophils Latest Units: % 0.9   % Immature Granulocytes Latest Units: % 0.9   Absolute Neutrophil Latest Ref Range: 1.6 - 8.3 10e9/L 2.6   Absolute Lymphocytes Latest Ref Range: 0.8 - 5.3 10e9/L 0.5 (L)   Absolute Monocytes Latest Ref Range: 0.0 - 1.3 10e9/L 0.4   Absolute Eosinophils Latest Ref Range: 0.0 - 0.7 10e9/L 0.0   Absolute Basophils Latest Ref Range: 0.0 - 0.2 10e9/L 0.0   Abs Immature Granulocytes Latest Ref Range: 0 - 0.4 10e9/L 0.0       MR CERVICAL SPINE W/O & W CONTRAST 1/31/2020 11:04 AM     Provided History: left arm tingling- hx of breast cancer with spine  mets; Carcinoma of breast  metastatic to bone, unspecified laterality  (H); Carcinoma of breast metastatic to bone, unspecified laterality  (H); Numbness and tingling in left arm; Numbness and tingling in left  arm  ICD-10: Carcinoma of breast metastatic to bone, unspecified laterality  (H); Carcinoma of breast metastatic to bone, unspecified laterality  (H); Numbness and tingling in left arm; Numbness and tingling in left  arm     Comparison: 19 2019     Technique: Sagittal T1-weighted, sagittal T2-weighted, sagittal  diffusion weighted, axial T2-weighted, and axial T2* gradient echo  images of the cervical spine were obtained without intravenous  contrast. Following intravenous administration of gadolinium, axial  and sagittal T1-weighted images with fat saturation were also  obtained.     Contrast: 7 ml Gadavist     Findings:  The cervical vertebrae are normally aligned. Mild straightening of the  normal cervical lordosis. There is no disc height narrowing at any  level.  There is normal signal within and normal contour of the  cervical spinal cord.     There are multiple enhancing foci within the cervical vertebral bodies  and posterior elements which have increased in size since the prior  study. For example the lesion in C7 is mildly increased measuring 11 x  10 mm where this previously measured 10 x 9 mm. The lesion in C4 now  measures 9 x 9 mm were this previously measured 5 x 5 mm. There are  lesions in the vertebral bodies of C2, C4, C6, C7, T1, T2, T3 and the  posterior elements of C3, C5 and C6. There is no epidural or neural  foraminal involvement. The degenerative changes in the cervical spine  are stable.                                                                      Impression:   1. Mild progression of the lesions in the cervical and upper thoracic  spine compared to the prior. No epidural or neural foraminal  involvement is seen..  2. Mild cervical spondylosis, unchanged. No significant spinal canal  stenosis or neural  foraminal narrowing.    Combined Report of:    PET and CT on  1/24/2020 11:40 AM :     1. PET of the neck, chest, abdomen, and pelvis.  2. PET CT Fusion for Attenuation Correction and Anatomical  Localization:    3. Diagnostic CT scan of the chest, abdomen, and pelvis with  intravenous contrast for interpretation.  3. CT of the chest, abdomen and pelvis obtained for diagnostic  interpretation.  4. 3D MIP and PET-CT fused images were processed on an independent  workstation and archived to PACS and reviewed by a radiologist.     Technique:     1. PET: The patient received 12.41 mCi of F-18-FDG; the serum glucose  was 110 prior to administration, body weight was 65.3 kg. Images were  evaluated in the axial, sagittal, and coronal planes as well as the  rotational whole body MIP. Images were acquired from the Vertex to the  Feet.     UPTAKE WAS MEASURED AT 60 MINUTES.      BACKGROUND:  Liver SUV max= 4.2,   Aorta Blood SUV Max: 3.2.      2. CT: Volumetric acquisition for clinical interpretation of the  chest, abdomen, and pelvis acquired at 3 mm sections . The chest,  abdomen, and pelvis were evaluated at 5 mm sections in bone, soft  tissue, and lung windows.       The patient received 85 cc. Of Isovue 370 intravenously for the  examination.    --     3. 3D MIP and PET-CT fused images were processed on an independent  workstation and archived to PACS and reviewed by a radiologist.     INDICATION: follow up metastatic breast cancer to bones; Metastatic  breast cancer (H); Bone metastases (H)     ADDITIONAL INFORMATION OBTAINED FROM EMR: History of invasive lobular  carcinoma of the right breast. Diagnosed in 2006. Status post  radiation therapy to the right breast with modified radical mastectomy  and latissimus dorsi flap reconstruction. Prophylactic left mastectomy  with similar reconstruction. Brain MRI with multiple cavitary lesions  suspicious for metastatic disease. Status post radiation therapy from  T12 to L5 for  metastatic lesions. Started on palliative Xeloda and  October 2019. Was intolerant of Xeloda and switched to paclitaxel on  11/5/2019     COMPARISON: PET CT 11/1/2019     FINDINGS:      HEAD/NECK:  There is no  suspicious FDG uptake in the neck.      Mucosal thickening in the right maxillary sinus.. The mastoid air  cells are clear.      The mucosal pharyngeal space, the , prevertebral and carotid  spaces are within normal limits.      No masses, mass effect or pathologically enlarged lymph nodes are  evident. The thyroid gland is normal.     CHEST:  There is no suspicious FDG uptake in the chest.      There are no pathologically enlarged mediastinal, hilar or axillary  lymph nodes. Calcified subcarinal lymph nodes.  There are no suspicious lung nodules or evidence for infection.   Resolution of previous left lower lobe consolidation.  Left-sided  Port-A-Cath with tip terminating in the superior cavoatrial junction.     There is no significant pericardial or plural effusions.     Postoperative changes of bilateral mastectomy with implant  reconstruction.     ABDOMEN AND PELVIS:  There is no suspicious FDG uptake in the abdomen or pelvis.     There are no suspicious hepatic lesions. Focal hypoattenuation along  the falciform ligament consistent with focal fatty infiltration. There  is no splenomegaly or evidence for splenic or pancreatic mass lesion.     There are no suspicious adrenal mass lesions. Cholecystectomy clips.  There is symmetric nephrographic renal phase without hydronephrosis.  There is a 3 mm nonobstructing calyceal tip stone in the inferior pole  of the left kidney. Surgical clips in the left pelvis, stable.     There is no evidence for diverticulitis, bowel obstruction or free  fluid.     LOWER EXTREMITIES:   No abnormal masses or hypermetabolic lesions.     BONES:      Multiple lytic lesions including but not limited to:  -Lytic lesions in the right parietal bone. This is not  hypermetabolic,  stable.  -Increase in size in lytic lesion within the vertebral body of C4  measuring 1.0 cm, previously 0.6 cm. There is a new central soft  tissue nodule. Maximal SUV of 4.1, previously nonhypermetabolic.  -Right proximal femur measuring 1.0 cm, previously 0.8 cm. In maximal  SUV of 3.9, unchanged.  -Right anterior iliac wing measuring 1.8 cm in length with maximal SUV  of 3.7, stable.  -Right superior iliac wing measuring 2.2 cm in length with maximal SUV  of 4.0, stable.  - Right iliac bone adjacent to the enterogastric and sacroiliac joint  max SUV 5.8, prior 3.5.  -Right lateral second rib with maximal SUV of 3.4, stable.  -Right lamina of T9 with maximal SUV of 4.7, previously 8.0  - Right 10th rib hypermetabolic unchanged.  Old bilateral rib fractures. Stable compression fracture of L1 with  approximately 50% height loss. Stable compression fracture of L4 with  approximately 10% height loss. Mild degenerative changes of the spine.                                                                         IMPRESSION: In this patient with history of breast cancer status post  multiple treatments and radiation therapy:  1. Overall progression as evidence by:  a. Increase in size and hypermetabolic activity of lesions in C4  vertebral body, right femoral bone and right iliac bone lesion  adjacent to the sacroiliac joint.  b. Decrease in hypermetabolic activity of lesion in the right T9  lamina.  (This was likely above the radiation field T12-L5)  c. Multiple other stable metastatic osseous lesions.  d. No other findings suspicious for metastatic disease.  2. Resolution of previous left lower lobe consolidation, presumably  atelectasis or infection.      ASSESSMENT AND PLAN:   1.  History of stage IIB, T2 N1 M0 invasive lobular carcinoma of the right breast.  It was initially diagnosed in 2006 and at that time it was hormone receptor positive, HER-2 negative.  She was treated on ECOG 2104 protocol  with dose-dense Adriamycin, Cytoxan and Avastin x4 cycles followed by Taxol and Avastin x4 cycles followed by a Avastin for 1 year.  She also received radiation to the right breast which she completed in January 2007 (5040 cGy).  She took Aromasin from 2007 to 2014.    Now she has metastatic breast cancer with extensive involvement of the bones.  There is also a left lower lobe hypermetabolic lesion and mediastinal lymph nodes which are hypermetabolic.  The biopsy from the right iliac bone is consistent with metastatic lobular breast cancer but it is hormone receptor and HER-2 negative and PDL 1 is also negative.    She has received 3000 cGy of palliative radiation to T12-L5 from 9/6/2019 till 9/18/2019.    She was started on palliative Xeloda but she was unable to tolerate even the dose reduced medication.        We decided on repeating scans and MRI brain on 10/25/2019 showed no intracranial metastatic disease.   Multiple enhancing calvarial lesions may be increased in number and are suggestive of metastatic disease. Thinner imaging on the current study may identify the smaller lesions and may be responsible for identified more lesions.   There is a single focus of T2 hyperintense signal within the anterior right temporal lobe may represent interval demyelination. There is no evidence for active demyelination.    PET/CT on 11/1/2019 showed favorable response to therapy and Overall FDG uptake within scattered osseous metastases is decreased since 8/30/2019, particularly within the pelvis. Increased sclerotic appearance of L1 and L4 pathologic compression deformities, likely sequela of recently completed palliative radiation therapy.    No significant FDG uptake in previously demonstrated hypermetabolic mediastinal lymph nodes. Biopsy negative on 9/5/2019.  There is also decreased FDG uptake in the left lower lobe (biopsy negative for malignancy), now with dense consolidation containing air bronchograms suggestive  of infection versus aspiration.  There is diffuse FDG uptake within the esophagus, consistent with esophagitis.    Because of intolerance to Xeloda we decided on switching to weekly paclitaxel on 11/5/2019.    For better tolerance we decreased the dose of paclitaxel to 70 mg/m  with cycle #2.  She has completed 3 cycles of Taxol and the last chemotherapy was on 1/14/2020.      PET/CT on 1/24/2020 showed progression of the disease in some of the bone lesions including increase in size and lytic lesion within the vertebral body of C4 measuring 1 cm as opposed to 0.6 cm previously and a new central soft tissue nodule with SUV max 4.1 when previously it was non-hypermetabolic.  There is also some progression noted in the right proximal femur and right iliac bone.  There is decreased metabolic uptake in the right lamina of T9 with SUV max 4.7 when previously it was 8.0.  Other lesions are stable.    There are no pathologically enlarged mediastinal, hilar or axillary lymph nodes. Calcified subcarinal lymph nodes. There are no suspicious lung nodules or evidence for infection and previous left lower lobe consolidation has resolved.     CA 27-29 also had increased significantly and it was 257 on 1/28/2020.    Due to progression we switched to eribulin on 1/28/2020.    She is tolerating the chemotherapy well.  We will proceed with cycle #3 today.  Cycle #3 was delayed by 1 week because she wanted to visit her family.  CA 27-29 was rising when last checked.  It is pending from today.  I recommend repeating scans after completing cycle #3.      Pain management.  This is from the widely metastatic breast cancer to the bones.  She is noticing more pain in the mid and lower back.  She ran out of oxycodone.  I gave her a refill of oxycodone and she will follow-up with palliative care.    Previously she got palliative radiation to T12-L5 3000 cGy in 10 fractions from 9/6/2019 till 9/18/2019.  She was evaluated by orthopedics   Bipin Elliott on 11/1/2019 and conservative management was recommended.   Because currently she is also noticing more pain in the mid back/mid thoracic region, I recommend repeating MRI of the thoracic spine.     Bone metastasis.  She received Zometa on 10/23/2019 and 01/14/2020.  She will get it every 3 months.  She will continue to take vitamin D and calcium.        Leukopenia/lymphocytopenia.  WBC is 3.5.  Lymphocyte count is low.  Neutrophil count is normal.  Continue same dose of chemotherapy.  Continue to keep a watch on blood counts.     Nausea and vomiting.  Dexamethasone and Emend as premedication are working well.  She will take Compazine and lorazepam as needed.      Insomnia.  Trazodone is helping and I refilled it today.        We did not address the following today.      Vision changes.    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  It was thought that this could be due to Taxol as patient reported to the ophthalmologist that she was on Taxol in September 2019 when her symptoms started.  But she was not on Taxol in September 2019 when she started noticing the visual changes so Taxol is not responsible for this.  The first dose of Taxol was on 11/5/2019.   That being said we have now switched the treatment to eribulin.  Continue to follow with ophthalmology.  She was told that the vision would take several weeks to improve.      Fatigue.  This is from the cancer and chemotherapy.  I again encouraged her to do regular exercise.  Continue to follow with cancer rehab.        Discussion regarding healthcare directives.  On previous visit she told me that she will bring the health care directive for our records but she forgot so I told her to bring it on next visit.      Breast implant removal.  She tells me that she was planning to do breast implant removal because there was a recall on this.  Her surgery was scheduled for 9/24/2019.  Because of this new development  of metastatic breast cancer and her recent radiation, surgery has been canceled.  We discussed that at this time treatment for metastatic breast cancer would take precedence over the other surgery as the other surgery would require her to be off chemotherapy for several weeks and in that case the chances of cancer progression would be high and I would not recommend that.    Multiple sclerosis.  She will continue to follow with her neurologist Dr. Quiles.  Currently multiple sclerosis is under control and currently she is not taking any medications.     I will see her back in 3 weeks.    All questions answered.  She is agreeable and comfortable with the plan.        Dewayne Zepeda MD      Again, thank you for allowing me to participate in the care of your patient.        Sincerely,        Dewayne Zepeda MD

## 2020-03-18 NOTE — PROGRESS NOTES
Infusion Nursing Note:  Shaneka Alvarez presents today for C3D1 Eribulin.    Patient seen by provider today: Yes: Dr. Zepeda   present during visit today: Not Applicable.    Note: N/A.    Intravenous Access:  Implanted Port.    Treatment Conditions:  Lab Results   Component Value Date    HGB 13.0 03/18/2020     Lab Results   Component Value Date    WBC 3.5 03/18/2020      Lab Results   Component Value Date    ANEU 2.6 03/18/2020     Lab Results   Component Value Date     03/18/2020      Lab Results   Component Value Date     03/18/2020                   Lab Results   Component Value Date    POTASSIUM 3.8 03/18/2020           Lab Results   Component Value Date    MAG 2.1 03/18/2020            Lab Results   Component Value Date    CR 0.60 03/18/2020                   Lab Results   Component Value Date    RAFAELA 8.5 03/18/2020                Lab Results   Component Value Date    BILITOTAL 0.3 03/18/2020           Lab Results   Component Value Date    ALBUMIN 3.2 03/18/2020                    Lab Results   Component Value Date    ALT 19 03/18/2020           Lab Results   Component Value Date    AST 20 03/18/2020         Post Infusion Assessment:  Patient tolerated infusion without incident.  Blood return noted pre and post infusion.  Site patent and intact, free from redness, edema or discomfort.  No evidence of extravasations.  Access discontinued per protocol.       Discharge Plan:   Patient will return 3/25 for next appointment.   Patient discharged in stable condition accompanied by: self.  Departure Mode: Ambulatory.    Cristina Loaiza RN

## 2020-03-18 NOTE — PATIENT INSTRUCTIONS
Chemo today    Schedule MRI Spine    Schedule PET CT around 4/3/2020    Refilled oxycodone and trazodone    See me back 4/7/2020

## 2020-03-25 ENCOUNTER — INFUSION THERAPY VISIT (OUTPATIENT)
Dept: INFUSION THERAPY | Facility: CLINIC | Age: 60
End: 2020-03-25
Payer: COMMERCIAL

## 2020-03-25 ENCOUNTER — ONCOLOGY VISIT (OUTPATIENT)
Dept: ONCOLOGY | Facility: CLINIC | Age: 60
End: 2020-03-25
Payer: COMMERCIAL

## 2020-03-25 VITALS
WEIGHT: 140.9 LBS | HEART RATE: 76 BPM | OXYGEN SATURATION: 100 % | DIASTOLIC BLOOD PRESSURE: 86 MMHG | BODY MASS INDEX: 25.76 KG/M2 | SYSTOLIC BLOOD PRESSURE: 129 MMHG | RESPIRATION RATE: 16 BRPM | TEMPERATURE: 98.1 F

## 2020-03-25 DIAGNOSIS — C50.919 METASTATIC BREAST CANCER: Primary | ICD-10-CM

## 2020-03-25 DIAGNOSIS — C50.912 BILATERAL MALIGNANT NEOPLASM OF BREAST IN FEMALE, UNSPECIFIED ESTROGEN RECEPTOR STATUS, UNSPECIFIED SITE OF BREAST (H): ICD-10-CM

## 2020-03-25 DIAGNOSIS — C50.911 BILATERAL MALIGNANT NEOPLASM OF BREAST IN FEMALE, UNSPECIFIED ESTROGEN RECEPTOR STATUS, UNSPECIFIED SITE OF BREAST (H): ICD-10-CM

## 2020-03-25 LAB
ALBUMIN SERPL-MCNC: 3.6 G/DL (ref 3.4–5)
ALP SERPL-CCNC: 88 U/L (ref 40–150)
ALT SERPL W P-5'-P-CCNC: 28 U/L (ref 0–50)
ANION GAP SERPL CALCULATED.3IONS-SCNC: 7 MMOL/L (ref 3–14)
AST SERPL W P-5'-P-CCNC: 25 U/L (ref 0–45)
BASOPHILS # BLD AUTO: 0.1 10E9/L (ref 0–0.2)
BASOPHILS NFR BLD AUTO: 2.1 %
BILIRUB SERPL-MCNC: 0.5 MG/DL (ref 0.2–1.3)
BUN SERPL-MCNC: 7 MG/DL (ref 7–30)
CALCIUM SERPL-MCNC: 9.1 MG/DL (ref 8.5–10.1)
CHLORIDE SERPL-SCNC: 105 MMOL/L (ref 94–109)
CO2 SERPL-SCNC: 26 MMOL/L (ref 20–32)
CREAT SERPL-MCNC: 0.61 MG/DL (ref 0.52–1.04)
DIFFERENTIAL METHOD BLD: ABNORMAL
EOSINOPHIL # BLD AUTO: 0 10E9/L (ref 0–0.7)
EOSINOPHIL NFR BLD AUTO: 0.3 %
ERYTHROCYTE [DISTWIDTH] IN BLOOD BY AUTOMATED COUNT: 14 % (ref 10–15)
GFR SERPL CREATININE-BSD FRML MDRD: >90 ML/MIN/{1.73_M2}
GLUCOSE SERPL-MCNC: 127 MG/DL (ref 70–99)
HCT VFR BLD AUTO: 40.4 % (ref 35–47)
HGB BLD-MCNC: 13.2 G/DL (ref 11.7–15.7)
IMM GRANULOCYTES # BLD: 0.1 10E9/L (ref 0–0.4)
IMM GRANULOCYTES NFR BLD: 4.2 %
LYMPHOCYTES # BLD AUTO: 0.5 10E9/L (ref 0.8–5.3)
LYMPHOCYTES NFR BLD AUTO: 17.4 %
MAGNESIUM SERPL-MCNC: 2 MG/DL (ref 1.6–2.3)
MCH RBC QN AUTO: 28.9 PG (ref 26.5–33)
MCHC RBC AUTO-ENTMCNC: 32.7 G/DL (ref 31.5–36.5)
MCV RBC AUTO: 88 FL (ref 78–100)
MONOCYTES # BLD AUTO: 0.1 10E9/L (ref 0–1.3)
MONOCYTES NFR BLD AUTO: 3.5 %
NEUTROPHILS # BLD AUTO: 2.1 10E9/L (ref 1.6–8.3)
NEUTROPHILS NFR BLD AUTO: 72.5 %
PLATELET # BLD AUTO: 175 10E9/L (ref 150–450)
POTASSIUM SERPL-SCNC: 3.8 MMOL/L (ref 3.4–5.3)
PROT SERPL-MCNC: 7.1 G/DL (ref 6.8–8.8)
RBC # BLD AUTO: 4.57 10E12/L (ref 3.8–5.2)
SODIUM SERPL-SCNC: 138 MMOL/L (ref 133–144)
WBC # BLD AUTO: 2.9 10E9/L (ref 4–11)

## 2020-03-25 PROCEDURE — 83735 ASSAY OF MAGNESIUM: CPT | Performed by: INTERNAL MEDICINE

## 2020-03-25 PROCEDURE — 99207 ZZC NO CHARGE LOS: CPT

## 2020-03-25 PROCEDURE — 99207 ZZC NO CHARGE NURSE ONLY: CPT

## 2020-03-25 PROCEDURE — 96409 CHEMO IV PUSH SNGL DRUG: CPT | Performed by: INTERNAL MEDICINE

## 2020-03-25 PROCEDURE — 80053 COMPREHEN METABOLIC PANEL: CPT | Performed by: INTERNAL MEDICINE

## 2020-03-25 PROCEDURE — 85025 COMPLETE CBC W/AUTO DIFF WBC: CPT | Performed by: INTERNAL MEDICINE

## 2020-03-25 PROCEDURE — 96367 TX/PROPH/DG ADDL SEQ IV INF: CPT | Performed by: INTERNAL MEDICINE

## 2020-03-25 RX ORDER — HEPARIN SODIUM (PORCINE) LOCK FLUSH IV SOLN 100 UNIT/ML 100 UNIT/ML
5 SOLUTION INTRAVENOUS
Status: DISCONTINUED | OUTPATIENT
Start: 2020-03-25 | End: 2020-03-25 | Stop reason: HOSPADM

## 2020-03-25 RX ADMIN — Medication 250 ML: at 10:13

## 2020-03-25 RX ADMIN — HEPARIN SODIUM (PORCINE) LOCK FLUSH IV SOLN 100 UNIT/ML 5 ML: 100 SOLUTION at 09:45

## 2020-03-25 RX ADMIN — HEPARIN SODIUM (PORCINE) LOCK FLUSH IV SOLN 100 UNIT/ML 5 ML: 100 SOLUTION at 11:01

## 2020-03-25 ASSESSMENT — PAIN SCALES - GENERAL: PAINLEVEL: MODERATE PAIN (4)

## 2020-03-25 NOTE — PROGRESS NOTES
Infusion Nursing Note:  Shaneka Alvarez presents today for Eribulin.    Patient seen by provider today: No   present during visit today: Not Applicable.    Note: N/A.    Intravenous Access:  Implanted Port.    Treatment Conditions:  Lab Results   Component Value Date    HGB 13.2 03/25/2020     Lab Results   Component Value Date    WBC 2.9 03/25/2020      Lab Results   Component Value Date    ANEU 2.1 03/25/2020     Lab Results   Component Value Date     03/25/2020      Lab Results   Component Value Date     03/25/2020                   Lab Results   Component Value Date    POTASSIUM 3.8 03/25/2020           Lab Results   Component Value Date    MAG 2.0 03/25/2020            Lab Results   Component Value Date    CR 0.61 03/25/2020                   Lab Results   Component Value Date    RAFAELA 9.1 03/25/2020                Lab Results   Component Value Date    BILITOTAL 0.5 03/25/2020           Lab Results   Component Value Date    ALBUMIN 3.6 03/25/2020                    Lab Results   Component Value Date    ALT 28 03/25/2020           Lab Results   Component Value Date    AST 25 03/25/2020         Post Infusion Assessment:  Patient tolerated infusion without incident.  Site patent and intact, free from redness, edema or discomfort.  No evidence of extravasations.  Access discontinued per protocol.       Discharge Plan:   Patient will return 4/7 for next appointment.   Patient discharged in stable condition accompanied by: self.  Departure Mode: Ambulatory.    Cristina Loaiza RN

## 2020-03-25 NOTE — PROGRESS NOTES
Port needle (one inch) left accessed for treatment. Tolerated port access and draw without complaint. Port site scrubbed with Chloraprep for 30 seconds and allowed to dry completely prior to Opsite dressing application. Accessed using sterile technique. [place] tubes drawn-Red gel/Green/Purple tubes. Double signed by patient and RN. See documentation flowsheet. Mariah Paniagua, RN, BSN, OCN

## 2020-03-26 ENCOUNTER — VIRTUAL VISIT (OUTPATIENT)
Dept: ONCOLOGY | Facility: CLINIC | Age: 60
End: 2020-03-26
Attending: HOSPITALIST
Payer: COMMERCIAL

## 2020-03-26 DIAGNOSIS — G89.3 CANCER ASSOCIATED PAIN: Primary | ICD-10-CM

## 2020-03-26 PROCEDURE — 99214 OFFICE O/P EST MOD 30 MIN: CPT | Mod: TEL | Performed by: HOSPITALIST

## 2020-03-26 RX ORDER — IBUPROFEN 600 MG/1
600 TABLET, FILM COATED ORAL 4 TIMES DAILY PRN
Qty: 90 TABLET | Refills: 3 | Status: SHIPPED | OUTPATIENT
Start: 2020-03-26 | End: 2021-02-05

## 2020-03-26 RX ORDER — MORPHINE SULFATE 15 MG/1
15 TABLET, FILM COATED, EXTENDED RELEASE ORAL EVERY EVENING
Qty: 30 TABLET | Refills: 0 | Status: SHIPPED | OUTPATIENT
Start: 2020-03-26 | End: 2020-05-06

## 2020-03-26 RX ORDER — MORPHINE SULFATE 15 MG/1
15-30 TABLET ORAL
Qty: 180 TABLET | Refills: 0 | Status: SHIPPED | OUTPATIENT
Start: 2020-03-26 | End: 2020-07-02

## 2020-03-26 NOTE — PATIENT INSTRUCTIONS
Patient Instructions      Expand All Collapse All    Thank you for coming into the Palliative Care Clinic today.     1. Pain  - stop taking oxycodone. Please discard of all remaining tablets  - start MS Contin (long-acting morphine) 15mg every evening (take roughly at the same time each day)          - don't take Trazodone for the first 3 days of taking this medication  - take MSIR (short-acting morphine) 15-30mg (1-2 tablets) up to every 3 hours as needed for pain.          - keep at least 2 hours between taking this morphine and lorazepam  - take ibuprofen 600mg up to four times a day as needed      2. Constipation: goal is one soft, formed stool daily  - take miralax up to twice daily  - take senna 2-4 tablets up to twice daily    3. Advance Care Planning: Please talk to your family about our conversation about resuscitation. Do write down any questions that come up so we can address them when I call you early next week.     Call if your symptoms change and the medications we have prescribed are not working. Do not take your medications at any other dose or frequently than how they are prescribed.     Call us at least a week in advance if you need a medication refill    Please bring all your pill bottles to your next appointment.     Failure to follow these instructions may result in the palliative care team not being able to prescribe your medications.    Return to clinic in 2 months for a follow up.     You can reach the Palliative Care Team during business hours at the following number:    - [phone]    To reach the Palliative Care Provider on-call After-hours or on holidays and weekends, call: [phone].  Please note that we are not able to provide pain medication refills on evenings or weekends.

## 2020-03-26 NOTE — PROGRESS NOTES
"      Shaneka Alvarez is a 59 year old female who is being evaluated via a billable telephone visit.      The patient has been notified of following:     \"This telephone visit will be conducted via a call between you and your physician/provider. We have found that certain health care needs can be provided without the need for a physical exam.  This service lets us provide the care you need with a short phone conversation.  If a prescription is necessary we can send it directly to your pharmacy.  If lab work is needed we can place an order for that and you can then stop by our lab to have the test done at a later time.    If during the course of the call the physician/provider feels a telephone visit is not appropriate, you will not be charged for this service.\"     Shaneka Alvarez complains of    Chief Complaint   Patient presents with     Oncology Clinic Visit     Bone metastases (H)       I have reviewed and updated the patient's Past Medical History, Social History, Family History and Medication List.    ALLERGIES  Bactrim [sulfamethoxazole w/trimethoprim] and Copaxone [glatiramer]    Additional provider notes: patient pain level 7. Headaches/migraines and spine     Assessment/Plan:  CY    Phone call duration: 5 minutes       Cari Hanley CMA    "

## 2020-03-26 NOTE — PROGRESS NOTES
Palliative Care Outpatient Clinic Progress Note    This note was written using voice recognition. I did proof read, but might have missed some things. Please contact me for major errors.    Patient Name:  Shaneka Alvarez  Primary Provider:  Mohit Barcenas  Primary Oncologist: Dr Zepeda  Last seen by palliative care: myself, 12/4/19. We addressed pain and nausea and I referred her to [facility]     Chief Complaint: back pain    Medical History/Summary:  - breast cancer ER+/AK+, Her2 neg diagnosed in 2006              - s/p systemic treatment with adriamycin, cytoxan, avastin, aromatase inhibitor as well as b/l mastectomy              - relapsed metastatic disease found in skull, vertebrae complicated by pathological fractures L1, L5   - s/p XRT to T12-L5 Sept 2019. Didn't tolerate Xeloda, paclitaxel Nov 2019-Jan 2020, now on eribulin and zometa  - MS with mild right-sided weakness       Interim History:      Patient is on the call by herself.    She has ongoing severe pain in her back. This tends to gain intensity throughout the day. It regularly keeps her awake.   Describes it as very achy, coming from the pain. The pain is right along her spine throughout the area where she had radiation. No radiation outwards.   No change in sensation, heat/cold sensation, no numbness.     Describes that her legs get very shaky so that sometimes it's difficult for her to walk. Associated with legs and back feeling weak. This started 4-6 weeks ago. Those episodes are becoming more frequent. Onset was around the time the pain worsened as well.     She had been taking oxycodone 10mg 2-3x/day without any relief, even the one time she took 15mg. No cognitive side effect. Didn't take any in the last 2-3 days, since it wasn't doing any good anyway.   Ibuprofen 400mg used to help, but not lately.   No success with OTC menthol patches recently either. Those were helpful in the past for this pain.   She seems to be better when  sleeping on the left, no other position that is comfortable.     This morning her  noticed discoloration along her spine in one of the areas where she had surgery. She states it doesn't look like a rash, more like a bruise. The area is tender to pressure. Shaneka states it actually feels like a bruise. She cannot recall an injury, doesn't regularly check that area, but he first noticed it today. No swelling.     Coping:  She states she is doing quite well other than the pain.   She stays at home, her  goes to work during the day. They have 3 dogs which bring her much chris. Her daughter in [facility] is keeping a distance due to COVID, but they talk on the phone regularly. Similarly, she talks often to close friends.   Shaneka describes herself as not terribly Holiness, but very faithful, and her yvette has been a source of strength for her as well.     Social History:  Pertinent changes to social history/social situation since last visit: no changes.     Key support resources: , daughter in [facility]    Advance Directive Status:  Discussed at length today.   Goals: to be able to interact with her family. One daughter and 2 grandchildren live in [place], she wants to be able to spend time with them.   She loves travelling, had dreamed of travelling with her  after shelter. She realizes that this will look very different and more limited than her dreams. But she is hopeful that she can do trips within the . She has discussed this with Dr Zepeda and they plan for treatment breaks as possible to allow for that.  We discuss resuscitation. Considering that she would very unlikely be able to connect with her family in ways that she cherishes or live a life close to her current life, she favours DNR. She wants to discuss this with her family over the weekend.     Social History     Tobacco Use     Smoking status: Former Smoker     Types: Cigarettes     Last attempt to quit: 12/1/2005     Years since quitting:  14.3     Smokeless tobacco: Never Used   Substance Use Topics     Alcohol use: Yes     Alcohol/week: 2.0 standard drinks     Types: 1 Glasses of wine, 1 Cans of beer per week     Comment: TWICE A WEEK     Drug use: No       Functional Status:  2 (Ambulatory and capable of all selfcare but unable to carry out any work activities; may need help with IADLs up and about > 50% of waking hours)      Impression & Recommendations & Counseliny/o woman with recurrent metastatic breast cancer complicated by spinal bone pain    Pain: The pain is consistent with her previously experienced bone pain in location and quality, but has worsened over the past 4-6 weeks. No relief with oxycodone up to 15mg at a time or 400mg ibuprofen.   The pain interferes with her sleep.   She has shaky legs at times and feels weak. This is symmetric. No sensory changes, no incontinence. We discussed the red flags for cord compression and that she should seek immediate help should any of those develop.  She has a PET scheduled for Friday next week.   - MS Contin 15mg every evening  - MSIR 15-30mg q3h prn  - ibuprofen 600mg qid prn, take with food  - stop oxycodone  - Naloxone is available in her home  - discussed bowel regimen    Bruising: unclear what this is about. Plt wnl on yesterday's lab. No fall/injury she can recall. Asked her to monitor.     Advance care planning: she favours DNR/DNI. She'll discuss this with her family over the weekend  - call back 3/31      Return to clinic in 2 months    Data / Chart Review:    Review of Systems:   ROS: 10 point ROS neg other than the symptoms noted above in the HPI and pertinents here.         Physical Exam:   Telephone visit    Speech fluent  Affect appropriate, memory and thought process intact      Allergies   Allergen Reactions     Bactrim [Sulfamethoxazole W/Trimethoprim]      Internal bleeding     Copaxone [Glatiramer] Hives       Current pertinent medications:  Oxycodone 5-10mg q4h  prn   Naloxone prescribed  acetaminophen 1000mg tid  Diclofenac gel, lidocaine patch     Dexamethasone 4mg q12h     Venlafaxine 225mg daily  Lorazepam 0.5mg prn  Trazodone 50mg HS  Buspirone bid     Senna bid prn, miralax bid prn    : ok    Opioid safety assessment:   Expected prognosis >1 year  Risk: Low (ORT 0)  Indication for Opioid Use: Moderate or Strong  Baseline UDT performed on: not sent yet  Naloxone Script provided if patient also on benzodiazepine: 12/4/2019   Indications for Tapering reviewed: yes, pending PET results    Past Medical History:   Diagnosis Date     Breast cancer (H)     Age 44     Common migraine      H/O bilateral mastectomy      Mild major depression (H)      Multiple sclerosis (H)      Past Surgical History:   Procedure Laterality Date     ENDOBRONCHIAL ULTRASOUND FLEXIBLE N/A 9/5/2019    Procedure: Flexible Bronchoscopy, Endobronchial Ultrasound, Radial Probe;  Surgeon: Sree Sanchez MD;  Location: [facility] OR     HC REMOVAL OF OVARY/TUBE(S)       HERNIA REPAIR, UMBILICAL       mastectomy  Bilateral 2006     MASTECTOMY, BILATERAL           Lab and imaging data reviewed:  Comments:   GFR>90    Phone call duration 28 minutes    Alva Whitaker MD  Palliative Medicine  Pager [phone]

## 2020-03-27 ENCOUNTER — ANCILLARY PROCEDURE (OUTPATIENT)
Dept: PET IMAGING | Facility: CLINIC | Age: 60
End: 2020-03-27
Attending: INTERNAL MEDICINE
Payer: COMMERCIAL

## 2020-03-27 ENCOUNTER — ONCOLOGY VISIT (OUTPATIENT)
Dept: ONCOLOGY | Facility: CLINIC | Age: 60
End: 2020-03-27
Payer: COMMERCIAL

## 2020-03-27 VITALS
BODY MASS INDEX: 27.05 KG/M2 | HEIGHT: 62 IN | SYSTOLIC BLOOD PRESSURE: 126 MMHG | TEMPERATURE: 97.5 F | DIASTOLIC BLOOD PRESSURE: 87 MMHG | RESPIRATION RATE: 16 BRPM | HEART RATE: 61 BPM | OXYGEN SATURATION: 100 % | WEIGHT: 147 LBS

## 2020-03-27 DIAGNOSIS — L81.9 HYPERPIGMENTATION OF SKIN: Primary | ICD-10-CM

## 2020-03-27 DIAGNOSIS — C79.51 CARCINOMA OF BREAST METASTATIC TO BONE, UNSPECIFIED LATERALITY (H): ICD-10-CM

## 2020-03-27 DIAGNOSIS — C50.919 CARCINOMA OF BREAST METASTATIC TO BONE, UNSPECIFIED LATERALITY (H): ICD-10-CM

## 2020-03-27 PROCEDURE — 99212 OFFICE O/P EST SF 10 MIN: CPT | Performed by: PHYSICIAN ASSISTANT

## 2020-03-27 PROCEDURE — 74177 CT ABD & PELVIS W/CONTRAST: CPT | Mod: 59 | Performed by: RADIOLOGY

## 2020-03-27 PROCEDURE — 78816 PET IMAGE W/CT FULL BODY: CPT | Mod: PS | Performed by: RADIOLOGY

## 2020-03-27 PROCEDURE — 71260 CT THORAX DX C+: CPT | Mod: 59 | Performed by: RADIOLOGY

## 2020-03-27 PROCEDURE — A9552 F18 FDG: HCPCS | Performed by: RADIOLOGY

## 2020-03-27 RX ORDER — CYCLOPENTOLATE HYDROCHLORIDE 10 MG/ML
SOLUTION/ DROPS OPHTHALMIC
COMMUNITY
Start: 2020-03-04 | End: 2021-02-05

## 2020-03-27 RX ORDER — IOPAMIDOL 755 MG/ML
10-135 INJECTION, SOLUTION INTRAVASCULAR ONCE
Status: COMPLETED | OUTPATIENT
Start: 2020-03-27 | End: 2020-03-27

## 2020-03-27 RX ADMIN — IOPAMIDOL 84 ML: 755 INJECTION, SOLUTION INTRAVASCULAR at 14:27

## 2020-03-27 ASSESSMENT — MIFFLIN-ST. JEOR: SCORE: 1205.04

## 2020-03-27 ASSESSMENT — PAIN SCALES - GENERAL: PAINLEVEL: MILD PAIN (3)

## 2020-03-27 NOTE — PROGRESS NOTES
Heme/onc visit note  March 27, 2020  Oncology team: Dr. Zepeda    Reason for visit: bruising/skin changes    Cancer history: Shaneka Alvarez is a 59 year old female with triple negative metastatic breast cancer to the bone. Most recent therapy has been eribulin, last dose 2 days ago 3/25.     Interval history: Yesterday Shaneka's  noting some discoloration of her low back. No trauma. Her low back pain is worsened but same location. No bleeding, bruising or rash anywhere else. No other new concerns. She has had previous radiation to T12-L5.     Past medical history:  Patient Active Problem List   Diagnosis     Neurogenic bladder     Vision changes     Demyelinating disorder (H)     Multiple sclerosis (H)     CARDIOVASCULAR SCREENING; LDL GOAL LESS THAN 160     Raynaud's syndrome     Breast cancer (H)     Complication of anesthesia     Arthritis     Autoimmune disorder (H)     Other insomnia     Medication management contract agreement     Migraine without aura and without status migrainosus, not intractable     Obesity, Class II, BMI 35-39.9     Anxiety     Major depressive disorder, recurrent episode, moderate (H)     Anxiety and depression     GERD (gastroesophageal reflux disease)     History of breast cancer     Hx of breast cancer     Migraine     Weakness     S/P breast reconstruction, bilateral     Metastatic disease (H)     Metastatic breast cancer (H)     Bone metastases (H)       Medications:  acetaminophen (TYLENOL) 500 MG tablet, Take 2 tablets (1,000 mg) by mouth 3 times daily  busPIRone (BUSPAR) 15 MG tablet, Take 1 tablet (15 mg) by mouth 2 times daily  calcium citrate-vitamin D (CITRACAL) 315-250 MG-UNIT TABS, Take by mouth daily  Cholecalciferol (VITAMIN D3 PO), Take 2,000 Units by mouth daily  diclofenac (VOLTAREN) 1 % topical gel, Place 4 g onto the skin 4 times daily . Apply to areas of pain. (Patient not taking: Reported on 1/14/2020)  ibuprofen (ADVIL/MOTRIN) 600 MG tablet, Take 1 tablet (600  "mg) by mouth 4 times daily as needed for moderate pain  LORazepam (ATIVAN) 0.5 MG tablet, Take 1 tablet (0.5 mg) by mouth every 4 hours as needed (Anxiety, Nausea/Vomiting or Sleep)  magnesium 250 MG tablet, Take 1 tablet by mouth daily  morphine (MS CONTIN) 15 MG CR tablet, Take 1 tablet (15 mg) by mouth every evening  morphine (MSIR) 15 MG IR tablet, Take 1-2 tablets (15-30 mg) by mouth every 3 hours as needed for severe pain  naloxone (NARCAN) 1 mg/mL for intranasal kit (2 syringes with 2 mucosal atomizer device), In opioid overdose put cone in nostril and push 1/2 of contents into each nostril.  Repeat every 3 min if no response until help arrives. (Patient not taking: Reported on 12/16/2019)  ondansetron (ZOFRAN-ODT) 8 MG ODT tab, Take 1 tablet (8 mg) by mouth every 8 hours as needed for nausea . Take first (before Compazine and Ativan).  polyethylene glycol (MIRALAX/GLYCOLAX) packet, Take 17 g by mouth 2 times daily as needed for constipation . Goal is to have a bowel movement every 1-2 days. (Patient not taking: Reported on 1/14/2020)  prochlorperazine (COMPAZINE) 10 MG tablet, Take 1 tablet (10 mg) by mouth every 6 hours as needed (Nausea/Vomiting)  propranolol (INDERAL LA) 60 MG 24 hr capsule, TAKE 1 CAPSULE(60 MG) BY MOUTH DAILY  senna-docusate (SENOKOT-S/PERICOLACE) 8.6-50 MG tablet, Take 1-2 tablets by mouth 2 times daily as needed for constipation . Goal is to have a bowel movement every 1-2 days. (Patient not taking: Reported on 1/14/2020)  syringe/needle, disp, (B-D INTEGRA SYRINGE) 23G X 1\" 3 ML MISC, 1 each as needed  traZODone (DESYREL) 50 MG tablet, Take 1 tablet (50 mg) by mouth At Bedtime  venlafaxine (EFFEXOR-ER) 225 MG TB24 24 hr tablet, TAKE 1 TABLET(225 MG) BY MOUTH DAILY WITH BREAKFAST  [DISCONTINUED] capecitabine (XELODA) 500 MG tablet CHEMO, Take 4 tabs ( 2000mg ) in the morning and 3 tabs (1500mg ) in the evening. Days 1 through 14, then off for 7 days. (Patient not taking: Reported on " "10/7/2019)    No current facility-administered medications on file prior to visit.       Allergy:     Allergies   Allergen Reactions     Bactrim [Sulfamethoxazole W/Trimethoprim]      Internal bleeding     Copaxone [Glatiramer] Hives       Physical exam  /87 (BP Location: Left arm)   Pulse 61   Temp 97.5  F (36.4  C) (Oral)   Resp 16   Ht 1.575 m (5' 2\")   Wt 66.7 kg (147 lb)   SpO2 100%   BMI 26.89 kg/m    Wt Readings from Last 4 Encounters:   03/25/20 63.9 kg (140 lb 14.4 oz)   03/18/20 64 kg (141 lb 1.6 oz)   02/25/20 65.6 kg (144 lb 9.6 oz)   02/18/20 64.8 kg (142 lb 14.4 oz)     Skin: Skin hyperpigmentation in the previous radiation field. There is a picture scanned into [employer] (encounters tab).   Labs:  Results for SHANEKA GU (MRN [id number]) as of 3/27/2020 11:24   Ref. Range 3/25/2020 09:55   WBC Latest Ref Range: 4.0 - 11.0 10e9/L 2.9 (L)   Hemoglobin Latest Ref Range: 11.7 - 15.7 g/dL 13.2   Hematocrit Latest Ref Range: 35.0 - 47.0 % 40.4   Platelet Count Latest Ref Range: 150 - 450 10e9/L 175   RBC Count Latest Ref Range: 3.8 - 5.2 10e12/L 4.57   MCV Latest Ref Range: 78 - 100 fl 88   MCH Latest Ref Range: 26.5 - 33.0 pg 28.9   MCHC Latest Ref Range: 31.5 - 36.5 g/dL 32.7   RDW Latest Ref Range: 10.0 - 15.0 % 14.0   Diff Method Unknown Automated Method   % Neutrophils Latest Units: % 72.5   % Lymphocytes Latest Units: % 17.4   % Monocytes Latest Units: % 3.5   % Eosinophils Latest Units: % 0.3   % Basophils Latest Units: % 2.1   % Immature Granulocytes Latest Units: % 4.2   Absolute Neutrophil Latest Ref Range: 1.6 - 8.3 10e9/L 2.1   Absolute Lymphocytes Latest Ref Range: 0.8 - 5.3 10e9/L 0.5 (L)   Absolute Monocytes Latest Ref Range: 0.0 - 1.3 10e9/L 0.1   Absolute Eosinophils Latest Ref Range: 0.0 - 0.7 10e9/L 0.0   Absolute Basophils Latest Ref Range: 0.0 - 0.2 10e9/L 0.1   Abs Immature Granulocytes Latest Ref Range: 0 - 0.4 10e9/L 0.1       Assessment and plan:  Shaneka Gu is a " 59 year old female with triple negative breast cancer metastatic to the bone. Her  notices skin discoloration which is in the distrubution of her radiation field. This appears to be radiation related skin hyperpigmentation. It is possible her  wasn't in the right light previously to notice it.     She has a PET/CT scheduled for today. The machine is currently down but hopefully will be back up later. Her plats were checked 2 days ago and normal. She has no trauma. No further work up is needed today, other than awaiting the results of the restaging scan.     Over 50% of 10 min visit was spent counseling and coordinating care    Amee Dia PA-C

## 2020-03-27 NOTE — NURSING NOTE
"Oncology Rooming Note    March 27, 2020 11:15 AM   Shaneka Alvarez is a 59 year old female who presents for:    Chief Complaint   Patient presents with     Oncology Clinic Visit     New bruising on back     Initial Vitals: /87 (BP Location: Left arm)   Pulse 61   Temp 97.5  F (36.4  C) (Oral)   Resp 16   Ht 1.575 m (5' 2\")   Wt 66.7 kg (147 lb)   SpO2 100%   BMI 26.89 kg/m   Estimated body mass index is 26.89 kg/m  as calculated from the following:    Height as of this encounter: 1.575 m (5' 2\").    Weight as of this encounter: 66.7 kg (147 lb). Body surface area is 1.71 meters squared.  Mild Pain (3) Comment: Data Unavailable   No LMP recorded. Patient is postmenopausal.  Allergies reviewed: Yes  Medications reviewed: Yes    Medications: Medication refills not needed today.  Pharmacy name entered into [facility]:    [id number] - [place], [place] - [address] AT [id number] [place], [place] - [address] [address] AT [facility] OF [facility] [address]  [facility] - [place], [place] - [address]    Clinical concerns: New bruising on back (spine) that patient's  noticed yesterday. Not painful except with pressure.       Yuni Botello LPN              "

## 2020-03-27 NOTE — LETTER
3/27/2020         RE: Shaneka Alvarez  [address] [address] [address]        Dear Colleague,    Thank you for referring your patient, Shaneka Alvarez, to the [facility]. Please see a copy of my visit note below.    Heme/onc visit note  March 27, 2020  Oncology team: Dr. Zepeda    Reason for visit: bruising/skin changes    Cancer history: Shaneka Alvarez is a 59 year old female with triple negative metastatic breast cancer to the bone. Most recent therapy has been eribulin, last dose 2 days ago 3/25.     Interval history: Yesterday Shaneka's  noting some discoloration of her low back. No trauma. Her low back pain is worsened but same location. No bleeding, bruising or rash anywhere else. No other new concerns. She has had previous radiation to T12-L5.     Past medical history:  Patient Active Problem List   Diagnosis     Neurogenic bladder     Vision changes     Demyelinating disorder (H)     Multiple sclerosis (H)     CARDIOVASCULAR SCREENING; LDL GOAL LESS THAN 160     Raynaud's syndrome     Breast cancer (H)     Complication of anesthesia     Arthritis     Autoimmune disorder (H)     Other insomnia     Medication management contract agreement     Migraine without aura and without status migrainosus, not intractable     Obesity, Class II, BMI 35-39.9     Anxiety     Major depressive disorder, recurrent episode, moderate (H)     Anxiety and depression     GERD (gastroesophageal reflux disease)     History of breast cancer     Hx of breast cancer     Migraine     Weakness     S/P breast reconstruction, bilateral     Metastatic disease (H)     Metastatic breast cancer (H)     Bone metastases (H)       Medications:  acetaminophen (TYLENOL) 500 MG tablet, Take 2 tablets (1,000 mg) by mouth 3 times daily  busPIRone (BUSPAR) 15 MG tablet, Take 1 tablet (15 mg) by mouth 2 times daily  calcium citrate-vitamin D (CITRACAL) 315-250 MG-UNIT TABS, Take by mouth daily  Cholecalciferol (VITAMIN D3  "PO), Take 2,000 Units by mouth daily  diclofenac (VOLTAREN) 1 % topical gel, Place 4 g onto the skin 4 times daily . Apply to areas of pain. (Patient not taking: Reported on 1/14/2020)  ibuprofen (ADVIL/MOTRIN) 600 MG tablet, Take 1 tablet (600 mg) by mouth 4 times daily as needed for moderate pain  LORazepam (ATIVAN) 0.5 MG tablet, Take 1 tablet (0.5 mg) by mouth every 4 hours as needed (Anxiety, Nausea/Vomiting or Sleep)  magnesium 250 MG tablet, Take 1 tablet by mouth daily  morphine (MS CONTIN) 15 MG CR tablet, Take 1 tablet (15 mg) by mouth every evening  morphine (MSIR) 15 MG IR tablet, Take 1-2 tablets (15-30 mg) by mouth every 3 hours as needed for severe pain  naloxone (NARCAN) 1 mg/mL for intranasal kit (2 syringes with 2 mucosal atomizer device), In opioid overdose put cone in nostril and push 1/2 of contents into each nostril.  Repeat every 3 min if no response until help arrives. (Patient not taking: Reported on 12/16/2019)  ondansetron (ZOFRAN-ODT) 8 MG ODT tab, Take 1 tablet (8 mg) by mouth every 8 hours as needed for nausea . Take first (before Compazine and Ativan).  polyethylene glycol (MIRALAX/GLYCOLAX) packet, Take 17 g by mouth 2 times daily as needed for constipation . Goal is to have a bowel movement every 1-2 days. (Patient not taking: Reported on 1/14/2020)  prochlorperazine (COMPAZINE) 10 MG tablet, Take 1 tablet (10 mg) by mouth every 6 hours as needed (Nausea/Vomiting)  propranolol (INDERAL LA) 60 MG 24 hr capsule, TAKE 1 CAPSULE(60 MG) BY MOUTH DAILY  senna-docusate (SENOKOT-S/PERICOLACE) 8.6-50 MG tablet, Take 1-2 tablets by mouth 2 times daily as needed for constipation . Goal is to have a bowel movement every 1-2 days. (Patient not taking: Reported on 1/14/2020)  syringe/needle, disp, (B-D INTEGRA SYRINGE) 23G X 1\" 3 ML MISC, 1 each as needed  traZODone (DESYREL) 50 MG tablet, Take 1 tablet (50 mg) by mouth At Bedtime  venlafaxine (EFFEXOR-ER) 225 MG TB24 24 hr tablet, TAKE 1 " "TABLET(225 MG) BY MOUTH DAILY WITH BREAKFAST  [DISCONTINUED] capecitabine (XELODA) 500 MG tablet CHEMO, Take 4 tabs ( 2000mg ) in the morning and 3 tabs (1500mg ) in the evening. Days 1 through 14, then off for 7 days. (Patient not taking: Reported on 10/7/2019)    No current facility-administered medications on file prior to visit.       Allergy:     Allergies   Allergen Reactions     Bactrim [Sulfamethoxazole W/Trimethoprim]      Internal bleeding     Copaxone [Glatiramer] Hives       Physical exam  /87 (BP Location: Left arm)   Pulse 61   Temp 97.5  F (36.4  C) (Oral)   Resp 16   Ht 1.575 m (5' 2\")   Wt 66.7 kg (147 lb)   SpO2 100%   BMI 26.89 kg/m    Wt Readings from Last 4 Encounters:   03/25/20 63.9 kg (140 lb 14.4 oz)   03/18/20 64 kg (141 lb 1.6 oz)   02/25/20 65.6 kg (144 lb 9.6 oz)   02/18/20 64.8 kg (142 lb 14.4 oz)     Skin: Skin hyperpigmentation in the previous radiation field. There is a picture scanned into [facility] (encounters tab).   Labs:  Results for KYLE GU (MRN [id number]) as of 3/27/2020 11:24   Ref. Range 3/25/2020 09:55   WBC Latest Ref Range: 4.0 - 11.0 10e9/L 2.9 (L)   Hemoglobin Latest Ref Range: 11.7 - 15.7 g/dL 13.2   Hematocrit Latest Ref Range: 35.0 - 47.0 % 40.4   Platelet Count Latest Ref Range: 150 - 450 10e9/L 175   RBC Count Latest Ref Range: 3.8 - 5.2 10e12/L 4.57   MCV Latest Ref Range: 78 - 100 fl 88   MCH Latest Ref Range: 26.5 - 33.0 pg 28.9   MCHC Latest Ref Range: 31.5 - 36.5 g/dL 32.7   RDW Latest Ref Range: 10.0 - 15.0 % 14.0   Diff Method Unknown Automated Method   % Neutrophils Latest Units: % 72.5   % Lymphocytes Latest Units: % 17.4   % Monocytes Latest Units: % 3.5   % Eosinophils Latest Units: % 0.3   % Basophils Latest Units: % 2.1   % Immature Granulocytes Latest Units: % 4.2   Absolute Neutrophil Latest Ref Range: 1.6 - 8.3 10e9/L 2.1   Absolute Lymphocytes Latest Ref Range: 0.8 - 5.3 10e9/L 0.5 (L)   Absolute Monocytes Latest Ref Range: 0.0 " - 1.3 10e9/L 0.1   Absolute Eosinophils Latest Ref Range: 0.0 - 0.7 10e9/L 0.0   Absolute Basophils Latest Ref Range: 0.0 - 0.2 10e9/L 0.1   Abs Immature Granulocytes Latest Ref Range: 0 - 0.4 10e9/L 0.1       Assessment and plan:  Shaneka Alvarez is a 59 year old female with triple negative breast cancer metastatic to the bone. Her  notices skin discoloration which is in the distrubution of her radiation field. This appears to be radiation related skin hyperpigmentation. It is possible her  wasn't in the right light previously to notice it.     She has a PET/CT scheduled for today. The machine is currently down but hopefully will be back up later. Her plats were checked 2 days ago and normal. She has no trauma. No further work up is needed today, other than awaiting the results of the restaging scan.     Over 50% of 10 min visit was spent counseling and coordinating care    Amee Dia PA-C      Again, thank you for allowing me to participate in the care of your patient.        Sincerely,        Amee Dia PA-C

## 2020-03-30 ENCOUNTER — TELEPHONE (OUTPATIENT)
Dept: ONCOLOGY | Facility: CLINIC | Age: 60
End: 2020-03-30

## 2020-03-30 NOTE — TELEPHONE ENCOUNTER
Central Prior Authorization Team   Phone: [phone]      PA Initiation    Medication: MS CONTIN 15MGS  Insurance Company: [email or web address] - Phone [phone] Fax [phone]  Pharmacy Filling the Rx: [facility] #[id number] - [place], [place] - [address] [facility] [address]  Filling Pharmacy Phone: [phone]  Filling Pharmacy Fax:    Start Date: 3/30/2020

## 2020-03-30 NOTE — TELEPHONE ENCOUNTER
Central Prior Authorization Team   Phone: [phone]      PA Initiation    Medication: Morphine 15mgs IR   Insurance Company: [employer] - Phone [phone] Fax [phone]  Pharmacy Filling the Rx: [facility] #[id number] [place], [place] - [address] AT [facility] [address]  Filling Pharmacy Phone: [phone]  Filling Pharmacy Fax:    Start Date: 3/30/2020         - C/w home lipitor 20mg qd History of HTN, on Amlodipine, Coreg at home, Currently normotensive   - Continue to hold home antihypertensives in setting of severe sepsis, add on PRN  - Continue to closely monitor BPs History of HTN, on Amlodipine, Coreg at home, Currently normotensive   - Continue to hold home antihypertensives in setting of severe sepsis, add on PRN  - Continue to closely monitor BPs    #HLD  - C/w home lipitor 20mg qd

## 2020-03-31 NOTE — TELEPHONE ENCOUNTER
Prior Authorization Approval    Authorization Effective Date: 3/1/2020  Authorization Expiration Date: 3/31/2023  Medication: Morphine 15mgs IR- APPROVED  Approved Dose/Quantity:   Reference #:     Insurance Company: [employer] - Phone [phone] Fax [phone]  Expected CoPay:       CoPay Card Available:      Foundation Assistance Needed:    Which Pharmacy is filling the prescription (Not needed for infusion/clinic administered): [facility] #[id number] [address] [facility] [address]  Pharmacy Notified: Yes-Pharmacy will notify patient when ready.  Patient Notified: No

## 2020-03-31 NOTE — TELEPHONE ENCOUNTER
Prior Authorization Approval    Authorization Effective Date: 3/1/2020  Authorization Expiration Date: 3/31/2023  Medication: MS CONTIN 15MGS-APPROVED  Approved Dose/Quantity:   Reference #:     Insurance Company: [employer] - Phone [phone] Fax [phone]  Expected CoPay:       CoPay Card Available:      Foundation Assistance Needed:    Which Pharmacy is filling the prescription (Not needed for infusion/clinic administered): [facility] #[id number] [place], [place] - [address] AT [facility] [address]  Pharmacy Notified: Yes-Pharmacy will notify patient when ready.  Patient Notified: No

## 2020-04-01 ENCOUNTER — VIRTUAL VISIT (OUTPATIENT)
Dept: ONCOLOGY | Facility: CLINIC | Age: 60
End: 2020-04-01
Payer: COMMERCIAL

## 2020-04-01 DIAGNOSIS — C50.919 METASTATIC BREAST CANCER: Primary | ICD-10-CM

## 2020-04-01 PROCEDURE — 99213 OFFICE O/P EST LOW 20 MIN: CPT | Mod: TEL | Performed by: HOSPITALIST

## 2020-04-01 NOTE — PROGRESS NOTES
I called Shaneka to follow up on her pain management and our conversation about code status last week.     Her pain is managed well with the current regimen. No concerns, side effects, or questions.    She has talked to her family. We discuss in some detail that a choice for DNR/DNI would not preclude further treatment for her cancer, nor treatment of reversible illnesses. She is reassured by this and states that she would want to change her code status to DNR/DNI.     We complete a POLST on the phone. I am sending her the original by mail. A copy is being emailed for scanning.     Telephone visit 16 minutes    Alva Whitaker MD  Palliative Medicine  Pager [phone]

## 2020-04-03 ENCOUNTER — ANCILLARY PROCEDURE (OUTPATIENT)
Dept: MRI IMAGING | Facility: CLINIC | Age: 60
End: 2020-04-03
Attending: INTERNAL MEDICINE
Payer: COMMERCIAL

## 2020-04-03 DIAGNOSIS — M54.6 MIDLINE THORACIC BACK PAIN, UNSPECIFIED CHRONICITY: ICD-10-CM

## 2020-04-03 DIAGNOSIS — C79.51 CARCINOMA OF BREAST METASTATIC TO BONE, UNSPECIFIED LATERALITY (H): ICD-10-CM

## 2020-04-03 DIAGNOSIS — C50.919 CARCINOMA OF BREAST METASTATIC TO BONE, UNSPECIFIED LATERALITY (H): ICD-10-CM

## 2020-04-03 PROCEDURE — 72157 MRI CHEST SPINE W/O & W/DYE: CPT | Performed by: RADIOLOGY

## 2020-04-03 PROCEDURE — A9585 GADOBUTROL INJECTION: HCPCS | Performed by: RADIOLOGY

## 2020-04-03 RX ORDER — GADOBUTROL 604.72 MG/ML
7.5 INJECTION INTRAVENOUS ONCE
Status: COMPLETED | OUTPATIENT
Start: 2020-04-03 | End: 2020-04-03

## 2020-04-03 RX ORDER — HEPARIN SODIUM (PORCINE) LOCK FLUSH IV SOLN 100 UNIT/ML 100 UNIT/ML
5 SOLUTION INTRAVENOUS ONCE
Status: COMPLETED | OUTPATIENT
Start: 2020-04-03 | End: 2020-04-03

## 2020-04-03 RX ADMIN — GADOBUTROL 7 ML: 604.72 INJECTION INTRAVENOUS at 11:38

## 2020-04-03 RX ADMIN — HEPARIN SODIUM (PORCINE) LOCK FLUSH IV SOLN 100 UNIT/ML 5 ML: 100 SOLUTION at 12:15

## 2020-04-07 ENCOUNTER — INFUSION THERAPY VISIT (OUTPATIENT)
Dept: INFUSION THERAPY | Facility: CLINIC | Age: 60
End: 2020-04-07
Payer: COMMERCIAL

## 2020-04-07 ENCOUNTER — ONCOLOGY VISIT (OUTPATIENT)
Dept: ONCOLOGY | Facility: CLINIC | Age: 60
End: 2020-04-07
Payer: COMMERCIAL

## 2020-04-07 ENCOUNTER — VIRTUAL VISIT (OUTPATIENT)
Dept: ONCOLOGY | Facility: CLINIC | Age: 60
End: 2020-04-07
Attending: INTERNAL MEDICINE
Payer: COMMERCIAL

## 2020-04-07 VITALS
HEART RATE: 62 BPM | OXYGEN SATURATION: 97 % | BODY MASS INDEX: 25.58 KG/M2 | SYSTOLIC BLOOD PRESSURE: 102 MMHG | HEIGHT: 62 IN | DIASTOLIC BLOOD PRESSURE: 72 MMHG | WEIGHT: 139 LBS

## 2020-04-07 DIAGNOSIS — C50.919 METASTATIC BREAST CANCER: ICD-10-CM

## 2020-04-07 DIAGNOSIS — C50.911 BILATERAL MALIGNANT NEOPLASM OF BREAST IN FEMALE, UNSPECIFIED ESTROGEN RECEPTOR STATUS, UNSPECIFIED SITE OF BREAST (H): ICD-10-CM

## 2020-04-07 DIAGNOSIS — C79.51 BONE METASTASES: Primary | ICD-10-CM

## 2020-04-07 DIAGNOSIS — C50.919 METASTATIC BREAST CANCER: Primary | ICD-10-CM

## 2020-04-07 DIAGNOSIS — C79.51 BONE METASTASES: ICD-10-CM

## 2020-04-07 DIAGNOSIS — C50.912 BILATERAL MALIGNANT NEOPLASM OF BREAST IN FEMALE, UNSPECIFIED ESTROGEN RECEPTOR STATUS, UNSPECIFIED SITE OF BREAST (H): ICD-10-CM

## 2020-04-07 LAB
ALBUMIN SERPL-MCNC: 3.5 G/DL (ref 3.4–5)
ALP SERPL-CCNC: 88 U/L (ref 40–150)
ALT SERPL W P-5'-P-CCNC: 29 U/L (ref 0–50)
ANION GAP SERPL CALCULATED.3IONS-SCNC: 4 MMOL/L (ref 3–14)
AST SERPL W P-5'-P-CCNC: 22 U/L (ref 0–45)
BILIRUB SERPL-MCNC: 0.4 MG/DL (ref 0.2–1.3)
BUN SERPL-MCNC: 8 MG/DL (ref 7–30)
CALCIUM SERPL-MCNC: 8.7 MG/DL (ref 8.5–10.1)
CANCER AG27-29 SERPL-ACNC: 222 U/ML (ref 0–39)
CHLORIDE SERPL-SCNC: 103 MMOL/L (ref 94–109)
CO2 SERPL-SCNC: 29 MMOL/L (ref 20–32)
CREAT SERPL-MCNC: 0.72 MG/DL (ref 0.52–1.04)
DIFFERENTIAL METHOD BLD: ABNORMAL
EOSINOPHIL # BLD AUTO: 0 10E9/L (ref 0–0.7)
EOSINOPHIL NFR BLD AUTO: 1 %
ERYTHROCYTE [DISTWIDTH] IN BLOOD BY AUTOMATED COUNT: 14.2 % (ref 10–15)
GFR SERPL CREATININE-BSD FRML MDRD: >90 ML/MIN/{1.73_M2}
GLUCOSE SERPL-MCNC: 131 MG/DL (ref 70–99)
HCT VFR BLD AUTO: 40.3 % (ref 35–47)
HGB BLD-MCNC: 13.1 G/DL (ref 11.7–15.7)
LYMPHOCYTES # BLD AUTO: 1.3 10E9/L (ref 0.8–5.3)
LYMPHOCYTES NFR BLD AUTO: 48 %
MAGNESIUM SERPL-MCNC: 2 MG/DL (ref 1.6–2.3)
MCH RBC QN AUTO: 28.7 PG (ref 26.5–33)
MCHC RBC AUTO-ENTMCNC: 32.5 G/DL (ref 31.5–36.5)
MCV RBC AUTO: 88 FL (ref 78–100)
MONOCYTES # BLD AUTO: 0.2 10E9/L (ref 0–1.3)
MONOCYTES NFR BLD AUTO: 9 %
NEUTROPHILS # BLD AUTO: 1.1 10E9/L (ref 1.6–8.3)
NEUTROPHILS NFR BLD AUTO: 42 %
PLATELET # BLD AUTO: 210 10E9/L (ref 150–450)
PLATELET # BLD EST: ABNORMAL 10*3/UL
POTASSIUM SERPL-SCNC: 3.9 MMOL/L (ref 3.4–5.3)
PROT SERPL-MCNC: 6.7 G/DL (ref 6.8–8.8)
RBC # BLD AUTO: 4.56 10E12/L (ref 3.8–5.2)
RBC MORPH BLD: NORMAL
SODIUM SERPL-SCNC: 136 MMOL/L (ref 133–144)
WBC # BLD AUTO: 2.6 10E9/L (ref 4–11)

## 2020-04-07 PROCEDURE — 99207 ZZC NO CHARGE NURSE ONLY: CPT

## 2020-04-07 PROCEDURE — S0028 INJECTION, FAMOTIDINE, 20 MG: HCPCS | Performed by: INTERNAL MEDICINE

## 2020-04-07 PROCEDURE — 96409 CHEMO IV PUSH SNGL DRUG: CPT | Performed by: INTERNAL MEDICINE

## 2020-04-07 PROCEDURE — 99214 OFFICE O/P EST MOD 30 MIN: CPT | Mod: GT | Performed by: INTERNAL MEDICINE

## 2020-04-07 PROCEDURE — 80053 COMPREHEN METABOLIC PANEL: CPT | Performed by: INTERNAL MEDICINE

## 2020-04-07 PROCEDURE — 85025 COMPLETE CBC W/AUTO DIFF WBC: CPT | Performed by: INTERNAL MEDICINE

## 2020-04-07 PROCEDURE — 96375 TX/PRO/DX INJ NEW DRUG ADDON: CPT | Performed by: INTERNAL MEDICINE

## 2020-04-07 PROCEDURE — 83735 ASSAY OF MAGNESIUM: CPT | Performed by: INTERNAL MEDICINE

## 2020-04-07 PROCEDURE — 86300 IMMUNOASSAY TUMOR CA 15-3: CPT | Performed by: INTERNAL MEDICINE

## 2020-04-07 PROCEDURE — 96367 TX/PROPH/DG ADDL SEQ IV INF: CPT | Performed by: INTERNAL MEDICINE

## 2020-04-07 PROCEDURE — 99207 ZZC NO CHARGE LOS: CPT

## 2020-04-07 RX ORDER — SODIUM CHLORIDE 9 MG/ML
1000 INJECTION, SOLUTION INTRAVENOUS CONTINUOUS PRN
Status: CANCELLED
Start: 2020-04-07

## 2020-04-07 RX ORDER — MEPERIDINE HYDROCHLORIDE 25 MG/ML
25 INJECTION INTRAMUSCULAR; INTRAVENOUS; SUBCUTANEOUS EVERY 30 MIN PRN
Status: CANCELLED | OUTPATIENT
Start: 2020-04-07

## 2020-04-07 RX ORDER — ALBUTEROL SULFATE 0.83 MG/ML
2.5 SOLUTION RESPIRATORY (INHALATION)
Status: CANCELLED | OUTPATIENT
Start: 2020-04-07

## 2020-04-07 RX ORDER — ZOLEDRONIC ACID 0.04 MG/ML
4 INJECTION, SOLUTION INTRAVENOUS ONCE
Status: COMPLETED | OUTPATIENT
Start: 2020-04-07 | End: 2020-04-07

## 2020-04-07 RX ORDER — EPINEPHRINE 1 MG/ML
0.3 INJECTION, SOLUTION INTRAMUSCULAR; SUBCUTANEOUS EVERY 5 MIN PRN
Status: CANCELLED | OUTPATIENT
Start: 2020-04-07

## 2020-04-07 RX ORDER — NALOXONE HYDROCHLORIDE 0.4 MG/ML
.1-.4 INJECTION, SOLUTION INTRAMUSCULAR; INTRAVENOUS; SUBCUTANEOUS
Status: CANCELLED | OUTPATIENT
Start: 2020-04-14

## 2020-04-07 RX ORDER — HEPARIN SODIUM (PORCINE) LOCK FLUSH IV SOLN 100 UNIT/ML 100 UNIT/ML
5 SOLUTION INTRAVENOUS
Status: CANCELLED | OUTPATIENT
Start: 2020-04-14

## 2020-04-07 RX ORDER — MEPERIDINE HYDROCHLORIDE 25 MG/ML
25 INJECTION INTRAMUSCULAR; INTRAVENOUS; SUBCUTANEOUS EVERY 30 MIN PRN
Status: CANCELLED | OUTPATIENT
Start: 2020-04-14

## 2020-04-07 RX ORDER — ALBUTEROL SULFATE 90 UG/1
1-2 AEROSOL, METERED RESPIRATORY (INHALATION)
Status: CANCELLED
Start: 2020-04-07

## 2020-04-07 RX ORDER — ALBUTEROL SULFATE 0.83 MG/ML
2.5 SOLUTION RESPIRATORY (INHALATION)
Status: CANCELLED | OUTPATIENT
Start: 2020-04-14

## 2020-04-07 RX ORDER — METHYLPREDNISOLONE SODIUM SUCCINATE 125 MG/2ML
125 INJECTION, POWDER, LYOPHILIZED, FOR SOLUTION INTRAMUSCULAR; INTRAVENOUS
Status: CANCELLED
Start: 2020-04-07

## 2020-04-07 RX ORDER — HEPARIN SODIUM (PORCINE) LOCK FLUSH IV SOLN 100 UNIT/ML 100 UNIT/ML
5 SOLUTION INTRAVENOUS
Status: DISCONTINUED | OUTPATIENT
Start: 2020-04-07 | End: 2020-04-07 | Stop reason: HOSPADM

## 2020-04-07 RX ORDER — HEPARIN SODIUM (PORCINE) LOCK FLUSH IV SOLN 100 UNIT/ML 100 UNIT/ML
5 SOLUTION INTRAVENOUS
Status: CANCELLED | OUTPATIENT
Start: 2020-04-07

## 2020-04-07 RX ORDER — EPINEPHRINE 0.3 MG/.3ML
0.3 INJECTION SUBCUTANEOUS EVERY 5 MIN PRN
Status: CANCELLED | OUTPATIENT
Start: 2020-04-14

## 2020-04-07 RX ORDER — HEPARIN SODIUM,PORCINE 10 UNIT/ML
5 VIAL (ML) INTRAVENOUS
Status: CANCELLED | OUTPATIENT
Start: 2020-04-07

## 2020-04-07 RX ORDER — SODIUM CHLORIDE 9 MG/ML
1000 INJECTION, SOLUTION INTRAVENOUS CONTINUOUS PRN
Status: CANCELLED
Start: 2020-04-14

## 2020-04-07 RX ORDER — EPINEPHRINE 0.3 MG/.3ML
0.3 INJECTION SUBCUTANEOUS EVERY 5 MIN PRN
Status: CANCELLED | OUTPATIENT
Start: 2020-04-07

## 2020-04-07 RX ORDER — DIPHENHYDRAMINE HYDROCHLORIDE 50 MG/ML
50 INJECTION INTRAMUSCULAR; INTRAVENOUS
Status: CANCELLED
Start: 2020-04-07

## 2020-04-07 RX ORDER — DIPHENHYDRAMINE HYDROCHLORIDE 50 MG/ML
50 INJECTION INTRAMUSCULAR; INTRAVENOUS
Status: CANCELLED
Start: 2020-04-14

## 2020-04-07 RX ORDER — LORAZEPAM 2 MG/ML
0.5 INJECTION INTRAMUSCULAR EVERY 4 HOURS PRN
Status: CANCELLED
Start: 2020-04-14

## 2020-04-07 RX ORDER — LORAZEPAM 2 MG/ML
0.5 INJECTION INTRAMUSCULAR EVERY 4 HOURS PRN
Status: CANCELLED
Start: 2020-04-07

## 2020-04-07 RX ORDER — HEPARIN SODIUM,PORCINE 10 UNIT/ML
5 VIAL (ML) INTRAVENOUS
Status: CANCELLED | OUTPATIENT
Start: 2020-04-14

## 2020-04-07 RX ORDER — METHYLPREDNISOLONE SODIUM SUCCINATE 125 MG/2ML
125 INJECTION, POWDER, LYOPHILIZED, FOR SOLUTION INTRAMUSCULAR; INTRAVENOUS
Status: CANCELLED
Start: 2020-04-14

## 2020-04-07 RX ORDER — EPINEPHRINE 1 MG/ML
0.3 INJECTION, SOLUTION INTRAMUSCULAR; SUBCUTANEOUS EVERY 5 MIN PRN
Status: CANCELLED | OUTPATIENT
Start: 2020-04-14

## 2020-04-07 RX ORDER — NALOXONE HYDROCHLORIDE 0.4 MG/ML
.1-.4 INJECTION, SOLUTION INTRAMUSCULAR; INTRAVENOUS; SUBCUTANEOUS
Status: CANCELLED | OUTPATIENT
Start: 2020-04-07

## 2020-04-07 RX ORDER — ALBUTEROL SULFATE 90 UG/1
1-2 AEROSOL, METERED RESPIRATORY (INHALATION)
Status: CANCELLED
Start: 2020-04-14

## 2020-04-07 RX ADMIN — HEPARIN SODIUM (PORCINE) LOCK FLUSH IV SOLN 100 UNIT/ML 5 ML: 100 SOLUTION at 13:27

## 2020-04-07 RX ADMIN — HEPARIN SODIUM (PORCINE) LOCK FLUSH IV SOLN 100 UNIT/ML 5 ML: 100 SOLUTION at 10:48

## 2020-04-07 RX ADMIN — Medication 250 ML: at 12:15

## 2020-04-07 RX ADMIN — ZOLEDRONIC ACID 4 MG: 0.04 INJECTION, SOLUTION INTRAVENOUS at 12:48

## 2020-04-07 ASSESSMENT — PAIN SCALES - GENERAL
PAINLEVEL: MODERATE PAIN (4)
PAINLEVEL: MILD PAIN (3)

## 2020-04-07 ASSESSMENT — MIFFLIN-ST. JEOR: SCORE: 1168.75

## 2020-04-07 NOTE — PROGRESS NOTES
Port needle-one inch- left accessed for treatment. Tolerated port access and draw without complaint. Port site scrubbed with Chloraprep for 30 seconds and allowed to dry completely prior to dressing application. Accessed using sterile technique. Tracy tubes drawn-Red gel/Green/Purple tubes. Double signed by patient and RN. See documentation flowsheet. Mariah Paniagua, RN, BSN, OCN

## 2020-04-07 NOTE — PROGRESS NOTES
ONCOLOGY FOLLOW UP NOTE: 4/7/2020        I took the history from reviewing the previous notes that she was following with Dr. Amaya.  I have copied and updated from prior notes.    ONCOLOGY History:    1.  January 2006:  Diagnosed with Stage IIB, T2 N1 M0 invasive lobular carcinoma of the right breast.  Final pathology showed a 4.5 x 3.8 x 2.5 cm, 01/14 lymph nodes positive.  Estrogen, progesterone receptor positive, HER-2 negative.     2.  Genetics.  BRCA1 and 2 mutations not detected. 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       THERAPY TO DATE:   1.  2006:  ECOG 2104 protocol of dose-dense Adriamycin, Cytoxan and Avastin x4 cycles followed by Taxol and Avastin x4 cycles.   2.  03/2007:  Completed 1 year Avastin.   3.  11/2006-01/2007:  Radiotherapy to the right breast of 5040 cGy.   4.  03/[id number]:  Aromasin.  Stopped after moving to the [facility].   5.  01/2016:  Right modified radical mastectomy with latissimus dorsi flap reconstruction and a left prophylactic mastectomy with latissimus dorsi flap reconstruction.     She recently had MRI of the brain as a follow-up for multiple sclerosis and there were multiple calvarial lesions noted which was suspicious for metastatic disease.  There was no evidence of new multiple sclerosis lesions.  She had a PET scan on 8/30/2019 which showed widespread bone metastatic lesions (calvarium, spine, sacrum, pelvis, ribs most prominent at C7, T3, L1 and L4), hypermetabolic 3 cm lesion in LLL, and mediastinal/hilar LN. CEA wnl at 1.9 and C27-29 elevated to 415 (28 on 8/23/16). A CT guided biopsy of the right iliac bone was obtained on 9/4/19, pathology consistent with metastatic adenocarcinoma (breast), ER/MN negative, HER-2 negative PDL 1 < 1%. A second biopsy was take of the LLL nodule via EBUS on 9/5/19 did not show any malignancy.    C/T/L spine MRI 8/3/19 to 9/2/19 with numerous enhancing lesions of the C, T and L spine, largest around T9 and L1. No evidence  of cord compression. Has a L1 compression fracture and impending L4.     Received radiation T12-L5 3000 cGy in 10 fractions from 9/6/2019 till 9/18/2019.  Neurosurgery recommended no surgical intervention but to wear Gorge brace when out of bed and HOB >30 degrees    She started palliative Xeloda 2000/1500 on 10/1/2019 but after just a few doses she noticed nausea, red eyes blurred vision, loss of appetite.  She stopped taking it after 5 doses and was seen by nurse practitioner on 10/7/2019 when she was improving.  At that point she was restarted on Xeloda at a lower dose of 1000 mg twice a day.  As she was not able to tolerate even the lower dose with extreme nausea and vomiting and feeling extremely fatigued and blurry vision, we decided on stopping Xeloda.    We decided on repeating scans and MRI brain on 10/25/2019 showed no intracranial metastatic disease.   Multiple enhancing calvarial lesions may be increased in number and are suggestive of metastatic disease. Thinner imaging on the current study may identify the smaller lesions and may be responsible for  identified more lesions.   There is a single focus of T2 hyperintense signal within the anterior right temporal lobe may represent interval demyelination. There is no evidence for active demyelination.    PET/CT on 11/1/2019 showed favorable response to therapy and Overall FDG uptake within scattered osseous metastases is decreased since 8/30/2019, particularly within the pelvis. Increased sclerotic appearance of L1 and L4 pathologic compression deformities, likely sequela of recently completed palliative radiation therapy.    No significant FDG uptake in previously demonstrated hypermetabolic mediastinal lymph nodes. Biopsy negative on 9/5/2019.  There is also decreased FDG uptake in the left lower lobe (biopsy negative for  malignancy), now with dense consolidation containing air bronchograms suggestive of infection versus aspiration.  There is diffuse  FDG uptake within the esophagus, consistent with esophagitis.    Because of intolerance to Xeloda we decided on switching to weekly paclitaxel on 11/5/2019.    C#2 Taxol 12/4/19- started with 70mg/m2 dose reduction    C#3 Taxol 12/30/2019     PET/CT on 1/24/2020 showed progression of the disease in some of the bone lesions including increase in size and lytic lesion within the vertebral body of C4 measuring 1 cm as opposed to 0.6 cm previously and a new central soft tissue nodule with SUV max 4.1 when previously it was non-hypermetabolic.  There is also some progression noted in the right proximal femur and right iliac bone.  There is decreased metabolic uptake in the right lamina of T9 with SUV max 4.7 when previously it was 8.0.  Other lesions are stable.    CA 27-29 also had increased significantly and it was 257 on 1/28/2020.    We decided on switching to eribulin on 1/28/2020.    C#2 2/18/2020  C#2 D#8 2/25/2020    C#3 D#1 3/18/2020 -delayed by 1 week as per patient's preference because she wanted to visit her family.    She had a repeat PET/CT on 3/27/2020 which showed a stable size of the bone metastatic lesions although the FDG avidity was less, consistent with treatment response.    She had MRI of the thoracic spine on 4/3/2020 and it was compared to the one which was done in August 2019 and it showed increased size of several metastatic lesions most notably at T9 but also at T5-T7.  Other lesions at T10, T11 and T12 appeared improved.    CA 27-29 was also down to 222 on 3/18/2020.    Cycle #4 eribulin-4/7/2020      I had also recommended starting Zometa so she got dental clearance to start with that.  She received Zometa on 10/23/2019 and 1/14/2020 and 4/7/2020 on a 3 monthly schedule.      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  It was thought that this could be due to Taxol as patient reported to the ophthalmologist that she was on Taxol in September 2019 when her  symptoms started.  But she was not on Taxol in September 2019 when she started noticing the visual changes so Taxol is not responsible for this.  The first dose of Taxol was on 11/5/2019.       Interval history.  This is a video visit.  Overall she is doing better.  She tells me that she has better energy.  Overall the mid to low back pain is under fair control with MS Contin.  She is following with palliative care.  She denies any new pain.  No new swellings.  Her nausea was very mild to the point that she did not take any antinausea medications.  Bowel movements are good.  Denies any fevers or infections or shortness of breath.  She has noticed slight worsening of the neuropathy in her hands with tingling and numbness.  She has baseline balance issues and heat sensitivity and weakness in the legs from multiple sclerosis.    She has noticed some improvement in vision on the right side.  Left sided vision is the same.    ECOG 1.    ROS:  Rest of the comprehensive review of the system was essentially unremarkable.        I reviewed other history in epic as below.       PAST MEDICAL HISTORY:     1.  Breast cancer  2.  Multiple sclerosis.   3.  Depression.   4.  Migraines.   5.  Hypertension.   6.  Cholecystectomy and umbilical hernia repair.     SOCIAL HISTORY: She smoked for 5 years but quit many years ago.  Drinks alcohol socially.  She lives with her .  She is a teacher in middle school.       FAMILY HISTORY: She mentions that her mother had breast cancer at 49.  Both grandmothers had breast cancer but she is not sure of the age.  A couple of cousins have cancers.  Patient has 3 children who are healthy.    Current Outpatient Medications   Medication     acetaminophen (TYLENOL) 500 MG tablet     busPIRone (BUSPAR) 15 MG tablet     calcium citrate-vitamin D (CITRACAL) 315-250 MG-UNIT TABS     Cholecalciferol (VITAMIN D3 PO)     cyclopentolate (CYCLOGYL) 1 % ophthalmic solution     diclofenac (VOLTAREN) 1 %  "topical gel     ibuprofen (ADVIL/MOTRIN) 600 MG tablet     LORazepam (ATIVAN) 0.5 MG tablet     magnesium 250 MG tablet     morphine (MS CONTIN) 15 MG CR tablet     morphine (MSIR) 15 MG IR tablet     naloxone (NARCAN) 1 mg/mL for intranasal kit (2 syringes with 2 mucosal atomizer device)     ondansetron (ZOFRAN-ODT) 8 MG ODT tab     polyethylene glycol (MIRALAX/GLYCOLAX) packet     prochlorperazine (COMPAZINE) 10 MG tablet     propranolol (INDERAL LA) 60 MG 24 hr capsule     senna-docusate (SENOKOT-S/PERICOLACE) 8.6-50 MG tablet     syringe/needle, disp, (B-D INTE[employer]RA SYRINGE) 23G X 1\" 3 ML MISC     traZODone (DESYREL) 50 MG tablet     venlafaxine (EFFEXOR-ER) 225 MG TB24 24 hr tablet     No current facility-administered medications for this visit.           Allergies   Allergen Reactions     Bactrim [Sulfamethoxazole W/Trimethoprim]      Internal bleeding     Copaxone [Glatiramer] Hives          PHYSICAL EXAMINATION:   There were no vitals taken for this visit.  Wt Readings from Last 4 Encounters:   03/27/20 66.7 kg (147 lb)   03/25/20 63.9 kg (140 lb 14.4 oz)   03/18/20 64 kg (141 lb 1.6 oz)   02/25/20 65.6 kg (144 lb 9.6 oz)         This is a video visit so examination is very limited.    Constitutional.  She seems to be in no acute distress.    Neurologic.  She is alert and oriented x3.  Psych.  Mood and mentation seems normal.  Decision-making capacity is intact.          Labs and imaging reviewed.    MR CERVICAL SPINE W/O & W CONTRAST 1/31/2020 11:04 AM     Provided History: left arm tingling- hx of breast cancer with spine  mets; Carcinoma of breast metastatic to bone, unspecified laterality  (H); Carcinoma of breast metastatic to bone, unspecified laterality  (H); Numbness and tingling in left arm; Numbness and tingling in left  arm  ICD-10: Carcinoma of breast metastatic to bone, unspecified laterality  (H); Carcinoma of breast metastatic to bone, unspecified laterality  (H); Numbness and tingling in left " arm; Numbness and tingling in left  arm     Comparison: 19 2019     Technique: Sagittal T1-weighted, sagittal T2-weighted, sagittal  diffusion weighted, axial T2-weighted, and axial T2* gradient echo  images of the cervical spine were obtained without intravenous  contrast. Following intravenous administration of gadolinium, axial  and sagittal T1-weighted images with fat saturation were also  obtained.     Contrast: 7 ml Gadavist     Findings:  The cervical vertebrae are normally aligned. Mild straightening of the  normal cervical lordosis. There is no disc height narrowing at any  level.  There is normal signal within and normal contour of the  cervical spinal cord.     There are multiple enhancing foci within the cervical vertebral bodies  and posterior elements which have increased in size since the prior  study. For example the lesion in C7 is mildly increased measuring 11 x  10 mm where this previously measured 10 x 9 mm. The lesion in C4 now  measures 9 x 9 mm were this previously measured 5 x 5 mm. There are  lesions in the vertebral bodies of C2, C4, C6, C7, T1, T2, T3 and the  posterior elements of C3, C5 and C6. There is no epidural or neural  foraminal involvement. The degenerative changes in the cervical spine  are stable.                                                                      Impression:   1. Mild progression of the lesions in the cervical and upper thoracic  spine compared to the prior. No epidural or neural foraminal  involvement is seen..  2. Mild cervical spondylosis, unchanged. No significant spinal canal  stenosis or neural foraminal narrowing.      ASSESSMENT AND PLAN:   1.  History of stage IIB, T2 N1 M0 invasive lobular carcinoma of the right breast.  It was initially diagnosed in 2006 and at that time it was hormone receptor positive, HER-2 negative.  She was treated on ECOG 2104 protocol with dose-dense Adriamycin, Cytoxan and Avastin x4 cycles followed by Taxol and Avastin x4  cycles followed by a Avastin for 1 year.  She also received radiation to the right breast which she completed in January 2007 (5040 cGy).  She took Aromasin from 2007 to 2014.    Now she has metastatic breast cancer with extensive involvement of the bones.  There is also a left lower lobe hypermetabolic lesion and mediastinal lymph nodes which are hypermetabolic.  The biopsy from the right iliac bone is consistent with metastatic lobular breast cancer but it is hormone receptor and HER-2 negative and PDL 1 is also negative.    She has received 3000 cGy of palliative radiation to T12-L5 from 9/6/2019 till 9/18/2019.    She was started on palliative Xeloda but she was unable to tolerate even the dose reduced medication.        We decided on repeating scans and MRI brain on 10/25/2019 showed no intracranial metastatic disease.   Multiple enhancing calvarial lesions may be increased in number and are suggestive of metastatic disease. Thinner imaging on the current study may identify the smaller lesions and may be responsible for identified more lesions.   There is a single focus of T2 hyperintense signal within the anterior right temporal lobe may represent interval demyelination. There is no evidence for active demyelination.    PET/CT on 11/1/2019 showed favorable response to therapy and Overall FDG uptake within scattered osseous metastases is decreased since 8/30/2019, particularly within the pelvis. Increased sclerotic appearance of L1 and L4 pathologic compression deformities, likely sequela of recently completed palliative radiation therapy.    No significant FDG uptake in previously demonstrated hypermetabolic mediastinal lymph nodes. Biopsy negative on 9/5/2019.  There is also decreased FDG uptake in the left lower lobe (biopsy negative for malignancy), now with dense consolidation containing air bronchograms suggestive of infection versus aspiration.  There is diffuse FDG uptake within the esophagus,  consistent with esophagitis.    Because of intolerance to Xeloda we decided on switching to weekly paclitaxel on 11/5/2019.    For better tolerance we decreased the dose of paclitaxel to 70 mg/m  with cycle #2.  She has completed 3 cycles of Taxol and the last chemotherapy was on 1/14/2020.      PET/CT on 1/24/2020 showed progression of the disease in some of the bone lesions including increase in size and lytic lesion within the vertebral body of C4 measuring 1 cm as opposed to 0.6 cm previously and a new central soft tissue nodule with SUV max 4.1 when previously it was non-hypermetabolic.  There is also some progression noted in the right proximal femur and right iliac bone.  There is decreased metabolic uptake in the right lamina of T9 with SUV max 4.7 when previously it was 8.0.  Other lesions are stable.    There are no pathologically enlarged mediastinal, hilar or axillary lymph nodes. Calcified subcarinal lymph nodes. There are no suspicious lung nodules or evidence for infection and previous left lower lobe consolidation has resolved.     CA 27-29 also had increased significantly and it was 257 on 1/28/2020.    Due to progression we switched to eribulin on 1/28/2020.      She has received 3 cycles of eribulin up until now.    She had a repeat PET/CT on 3/27/2020 which showed a stable size of the bone metastatic lesions although the FDG avidity was less, consistent with treatment response.    She had MRI of the thoracic spine on 4/3/2020 and it was compared to the one which was done in August 2019 and it showed increased size of several metastatic lesions most notably at T9 but also at T5-T7.  Other lesions at T10, T11 and T12 appeared improved.    CA 27-29 was also down to 222 on 3/18/2020.    I believe overall this is consistent with a partial response to the treatment.  Overall she is tolerating eribulin well with some worsening of neuropathy and cytopenias.    We discussed the situation in detail.  I  would like to continue with chemotherapy with some modifications.  We will give cycle #4 today but I would decrease the dose of eribulin to 1.2 mg/m .    Neuropathy.  She has some worsening of neuropathy and I hope with decreasing the dose of neuropathy this would make the chemotherapy more tolerable.    Neutropenia.  ANC is 1.1.  Hopefully with the reduction in the chemotherapy dose, cytopenias would be better.      Pain management.  This is from the widely metastatic breast cancer to the bones.  She is following with palliative care.  Currently she is on MS Contin.  Recent scans show overall some improvement in FDG activity of the spine lesions but the MRI of the thoracic spine which was compared from August 2019 shows some worsening.  Considering that the PET CT shows decreased FDG avidity, I believe this is consistent with response to therapy rather than actually worsening as the MRI of the thoracic spine was compared to the MRI which was done several months ago.     Previously she got palliative radiation to T12-L5 3000 cGy in 10 fractions from 9/6/2019 till 9/18/2019.  She was evaluated by orthopedics Dr. Bipin Elliott on 11/1/2019 and conservative management was recommended.      Bone metastasis.  She received Zometa on 10/23/2019 and 01/14/2020.  She will again get it today on 4/7/2020.  Continue calcium and vitamin D.      Nausea and vomiting.  Dexamethasone and Emend as premedication are working well.  She had minimal nausea at this time when she did not even take Compazine.  She has Compazine and lorazepam if need be.     Increased FDG ability in the colon.  She had FDG uptake in the cecum and ascending colon but no corresponding lesion was seen on the CT scan.  She is completely asymptomatic so at this time we will continue to observe.    We did not address the following today.  Insomnia.  Trazodone is helping and I refilled it today.        Vision changes.    She was evaluated by ophthalmology and  was noted to have cystoid macular edema.  It was thought that this could be due to Taxol as patient reported to the ophthalmologist that she was on Taxol in September 2019 when her symptoms started.  But she was not on Taxol in September 2019 when she started noticing the visual changes so Taxol is not responsible for this.  The first dose of Taxol was on 11/5/2019.   That being said we have now switched the treatment to eribulin.  Continue to follow with ophthalmology.  She was told that the vision would take several weeks to improve.      Fatigue.  This is from the cancer and chemotherapy.  I again encouraged her to do regular exercise.  Continue to follow with cancer rehab.        Discussion regarding healthcare directives.  On previous visit she told me that she will bring the health care directive for our records but she forgot so I told her to bring it on next visit.      Breast implant removal.  She tells me that she was planning to do breast implant removal because there was a recall on this.  Her surgery was scheduled for 9/24/2019.  Because of this new development of metastatic breast cancer and her recent radiation, surgery has been canceled.  We discussed that at this time treatment for metastatic breast cancer would take precedence over the other surgery as the other surgery would require her to be off chemotherapy for several weeks and in that case the chances of cancer progression would be high and I would not recommend that.    Multiple sclerosis.  She will continue to follow with her neurologist Dr. Quiles.  Currently multiple sclerosis is under control and currently she is not taking any medications.    RTC in 3 weeks.    All questions answered.  She is agreeable and comfortable with the plan.        Dewayne Zepeda MD    Video start time. 1143 AM  Video Stop time. 1155 AM

## 2020-04-07 NOTE — PROGRESS NOTES
Infusion Nursing Note:  Shaneka GARCÍA Alvarez presents today for C4D1 Eribulin/Zometa.    Patient seen by provider today: Yes:    present during visit today: Not Applicable.    Note: N/A.    Intravenous Access:  Implanted Port.    Treatment Conditions:  Lab Results   Component Value Date    HGB 13.1 04/07/2020     Lab Results   Component Value Date    WBC 2.6 04/07/2020      Lab Results   Component Value Date    ANEU 1.1 04/07/2020     Lab Results   Component Value Date     04/07/2020      Lab Results   Component Value Date     04/07/2020                   Lab Results   Component Value Date    POTASSIUM 3.9 04/07/2020           Lab Results   Component Value Date    MAG 2.0 04/07/2020            Lab Results   Component Value Date    CR 0.72 04/07/2020                   Lab Results   Component Value Date    RAFAELA 8.7 04/07/2020                Lab Results   Component Value Date    BILITOTAL 0.4 04/07/2020           Lab Results   Component Value Date    ALBUMIN 3.5 04/07/2020                    Lab Results   Component Value Date    ALT 29 04/07/2020           Lab Results   Component Value Date    AST 22 04/07/2020       Results reviewed, labs MET treatment parameters, ok to proceed with treatment.  Mag-2.0.      Post Infusion Assessment:  Patient tolerated infusion without incident.  Blood return noted pre and post infusion.  Blood return noted during administration every 3 cc.  Site patent and intact, free from redness, edema or discomfort.  No evidence of extravasations.  Access discontinued per protocol.       Discharge Plan:   Discharge instructions reviewed with: Patient.  Patient and/or family verbalized understanding of discharge instructions and all questions answered.  Patient discharged in stable condition accompanied by: self.  Departure Mode: Ambulatory.  Patient will stop by  for future appts.    Keira Mixon RN

## 2020-04-14 ENCOUNTER — INFUSION THERAPY VISIT (OUTPATIENT)
Dept: INFUSION THERAPY | Facility: CLINIC | Age: 60
End: 2020-04-14
Payer: COMMERCIAL

## 2020-04-14 ENCOUNTER — ONCOLOGY VISIT (OUTPATIENT)
Dept: ONCOLOGY | Facility: CLINIC | Age: 60
End: 2020-04-14
Payer: COMMERCIAL

## 2020-04-14 VITALS
HEART RATE: 78 BPM | OXYGEN SATURATION: 98 % | BODY MASS INDEX: 25.77 KG/M2 | TEMPERATURE: 98.4 F | WEIGHT: 140.9 LBS | DIASTOLIC BLOOD PRESSURE: 91 MMHG | SYSTOLIC BLOOD PRESSURE: 129 MMHG | RESPIRATION RATE: 16 BRPM

## 2020-04-14 DIAGNOSIS — C50.919 METASTATIC BREAST CANCER: Primary | ICD-10-CM

## 2020-04-14 DIAGNOSIS — C50.911 BILATERAL MALIGNANT NEOPLASM OF BREAST IN FEMALE, UNSPECIFIED ESTROGEN RECEPTOR STATUS, UNSPECIFIED SITE OF BREAST (H): ICD-10-CM

## 2020-04-14 DIAGNOSIS — C50.912 BILATERAL MALIGNANT NEOPLASM OF BREAST IN FEMALE, UNSPECIFIED ESTROGEN RECEPTOR STATUS, UNSPECIFIED SITE OF BREAST (H): ICD-10-CM

## 2020-04-14 LAB
ALBUMIN SERPL-MCNC: 3.5 G/DL (ref 3.4–5)
ALP SERPL-CCNC: 79 U/L (ref 40–150)
ALT SERPL W P-5'-P-CCNC: 25 U/L (ref 0–50)
ANION GAP SERPL CALCULATED.3IONS-SCNC: 4 MMOL/L (ref 3–14)
AST SERPL W P-5'-P-CCNC: 23 U/L (ref 0–45)
BASOPHILS # BLD AUTO: 0.1 10E9/L (ref 0–0.2)
BASOPHILS NFR BLD AUTO: 1.1 %
BILIRUB SERPL-MCNC: 0.3 MG/DL (ref 0.2–1.3)
BUN SERPL-MCNC: 7 MG/DL (ref 7–30)
CALCIUM SERPL-MCNC: 8.7 MG/DL (ref 8.5–10.1)
CHLORIDE SERPL-SCNC: 105 MMOL/L (ref 94–109)
CO2 SERPL-SCNC: 30 MMOL/L (ref 20–32)
CREAT SERPL-MCNC: 0.65 MG/DL (ref 0.52–1.04)
DIFFERENTIAL METHOD BLD: ABNORMAL
EOSINOPHIL # BLD AUTO: 0 10E9/L (ref 0–0.7)
EOSINOPHIL NFR BLD AUTO: 0.2 %
ERYTHROCYTE [DISTWIDTH] IN BLOOD BY AUTOMATED COUNT: 14.1 % (ref 10–15)
GFR SERPL CREATININE-BSD FRML MDRD: >90 ML/MIN/{1.73_M2}
GLUCOSE SERPL-MCNC: 118 MG/DL (ref 70–99)
HCT VFR BLD AUTO: 37.7 % (ref 35–47)
HGB BLD-MCNC: 12.5 G/DL (ref 11.7–15.7)
IMM GRANULOCYTES # BLD: 0.1 10E9/L (ref 0–0.4)
IMM GRANULOCYTES NFR BLD: 1.9 %
LYMPHOCYTES # BLD AUTO: 0.7 10E9/L (ref 0.8–5.3)
LYMPHOCYTES NFR BLD AUTO: 14.4 %
MAGNESIUM SERPL-MCNC: 2.1 MG/DL (ref 1.6–2.3)
MCH RBC QN AUTO: 29.1 PG (ref 26.5–33)
MCHC RBC AUTO-ENTMCNC: 33.2 G/DL (ref 31.5–36.5)
MCV RBC AUTO: 88 FL (ref 78–100)
MONOCYTES # BLD AUTO: 0.2 10E9/L (ref 0–1.3)
MONOCYTES NFR BLD AUTO: 4.4 %
NEUTROPHILS # BLD AUTO: 3.7 10E9/L (ref 1.6–8.3)
NEUTROPHILS NFR BLD AUTO: 78 %
PLATELET # BLD AUTO: 182 10E9/L (ref 150–450)
POTASSIUM SERPL-SCNC: 3.6 MMOL/L (ref 3.4–5.3)
PROT SERPL-MCNC: 6.5 G/DL (ref 6.8–8.8)
RBC # BLD AUTO: 4.29 10E12/L (ref 3.8–5.2)
SODIUM SERPL-SCNC: 139 MMOL/L (ref 133–144)
WBC # BLD AUTO: 4.7 10E9/L (ref 4–11)

## 2020-04-14 PROCEDURE — 85025 COMPLETE CBC W/AUTO DIFF WBC: CPT | Performed by: INTERNAL MEDICINE

## 2020-04-14 PROCEDURE — 99207 ZZC NO CHARGE NURSE ONLY: CPT

## 2020-04-14 PROCEDURE — 80053 COMPREHEN METABOLIC PANEL: CPT | Performed by: INTERNAL MEDICINE

## 2020-04-14 PROCEDURE — 99207 ZZC NO CHARGE LOS: CPT

## 2020-04-14 PROCEDURE — 96367 TX/PROPH/DG ADDL SEQ IV INF: CPT | Performed by: INTERNAL MEDICINE

## 2020-04-14 PROCEDURE — 83735 ASSAY OF MAGNESIUM: CPT | Performed by: INTERNAL MEDICINE

## 2020-04-14 PROCEDURE — 96409 CHEMO IV PUSH SNGL DRUG: CPT | Performed by: INTERNAL MEDICINE

## 2020-04-14 RX ORDER — HEPARIN SODIUM (PORCINE) LOCK FLUSH IV SOLN 100 UNIT/ML 100 UNIT/ML
5 SOLUTION INTRAVENOUS
Status: DISCONTINUED | OUTPATIENT
Start: 2020-04-14 | End: 2020-04-14 | Stop reason: HOSPADM

## 2020-04-14 RX ADMIN — HEPARIN SODIUM (PORCINE) LOCK FLUSH IV SOLN 100 UNIT/ML 5 ML: 100 SOLUTION at 15:04

## 2020-04-14 RX ADMIN — Medication 250 ML: at 15:44

## 2020-04-14 RX ADMIN — HEPARIN SODIUM (PORCINE) LOCK FLUSH IV SOLN 100 UNIT/ML 5 ML: 100 SOLUTION at 16:17

## 2020-04-14 ASSESSMENT — PAIN SCALES - GENERAL: PAINLEVEL: NO PAIN (0)

## 2020-04-14 NOTE — PROGRESS NOTES
Infusion Nursing Note:  Shaneka Alvarez presents today for C4D1 [place]    Patient seen by provider today: No   present during visit today: Not Applicable.    Note: N/A.    Intravenous Access:  Implanted Port.    Treatment Conditions:  Lab Results   Component Value Date    HGB 12.5 04/14/2020     Lab Results   Component Value Date    WBC 4.7 04/14/2020      Lab Results   Component Value Date    ANEU 3.7 04/14/2020     Lab Results   Component Value Date     04/14/2020      Lab Results   Component Value Date     04/14/2020                   Lab Results   Component Value Date    POTASSIUM 3.6 04/14/2020           Lab Results   Component Value Date    MAG 2.1 04/14/2020            Lab Results   Component Value Date    CR 0.65 04/14/2020                   Lab Results   Component Value Date    RAFAELA 8.7 04/14/2020                Lab Results   Component Value Date    BILITOTAL 0.3 04/14/2020           Lab Results   Component Value Date    ALBUMIN 3.5 04/14/2020                    Lab Results   Component Value Date    ALT 25 04/14/2020           Lab Results   Component Value Date    AST 23 04/14/2020       Results reviewed, labs MET treatment parameters, ok to proceed with treatment.      Post Infusion Assessment:  Patient tolerated infusion without incident.  Blood return noted pre and post infusion.  Site patent and intact, free from redness, edema or discomfort.  Access discontinued per protocol.       Discharge Plan:   [facility]  Patient will return 4/28/2020 for next appointment.   Patient discharged in stable condition accompanied by: self.  Departure Mode: Ambulatory.    Bobbi Contreras RN

## 2020-04-15 ENCOUNTER — DOCUMENTATION ONLY (OUTPATIENT)
Dept: OTHER | Facility: CLINIC | Age: 60
End: 2020-04-15

## 2020-04-28 ENCOUNTER — VIRTUAL VISIT (OUTPATIENT)
Dept: ONCOLOGY | Facility: CLINIC | Age: 60
End: 2020-04-28
Payer: COMMERCIAL

## 2020-04-28 ENCOUNTER — ONCOLOGY VISIT (OUTPATIENT)
Dept: ONCOLOGY | Facility: CLINIC | Age: 60
End: 2020-04-28
Payer: COMMERCIAL

## 2020-04-28 ENCOUNTER — INFUSION THERAPY VISIT (OUTPATIENT)
Dept: INFUSION THERAPY | Facility: CLINIC | Age: 60
End: 2020-04-28
Attending: INTERNAL MEDICINE
Payer: COMMERCIAL

## 2020-04-28 VITALS
SYSTOLIC BLOOD PRESSURE: 114 MMHG | OXYGEN SATURATION: 98 % | BODY MASS INDEX: 25.39 KG/M2 | TEMPERATURE: 98.1 F | DIASTOLIC BLOOD PRESSURE: 80 MMHG | WEIGHT: 138.8 LBS

## 2020-04-28 VITALS — HEART RATE: 65 BPM | DIASTOLIC BLOOD PRESSURE: 68 MMHG | SYSTOLIC BLOOD PRESSURE: 105 MMHG | OXYGEN SATURATION: 99 %

## 2020-04-28 DIAGNOSIS — H30.23 POSTERIOR CYCLITIS OF BOTH EYES: ICD-10-CM

## 2020-04-28 DIAGNOSIS — C50.911 BILATERAL MALIGNANT NEOPLASM OF BREAST IN FEMALE, UNSPECIFIED ESTROGEN RECEPTOR STATUS, UNSPECIFIED SITE OF BREAST (H): ICD-10-CM

## 2020-04-28 DIAGNOSIS — C50.912 BILATERAL MALIGNANT NEOPLASM OF BREAST IN FEMALE, UNSPECIFIED ESTROGEN RECEPTOR STATUS, UNSPECIFIED SITE OF BREAST (H): ICD-10-CM

## 2020-04-28 DIAGNOSIS — C50.919 METASTATIC BREAST CANCER: Primary | ICD-10-CM

## 2020-04-28 DIAGNOSIS — H30.23 POSTERIOR CYCLITIS, BILATERAL: ICD-10-CM

## 2020-04-28 LAB
ALBUMIN SERPL-MCNC: 3.4 G/DL (ref 3.4–5)
ALP SERPL-CCNC: 78 U/L (ref 40–150)
ALT SERPL W P-5'-P-CCNC: 17 U/L (ref 0–50)
ANION GAP SERPL CALCULATED.3IONS-SCNC: 5 MMOL/L (ref 3–14)
AST SERPL W P-5'-P-CCNC: 19 U/L (ref 0–45)
BASOPHILS # BLD AUTO: 0 10E9/L (ref 0–0.2)
BASOPHILS NFR BLD AUTO: 0.7 %
BILIRUB SERPL-MCNC: 0.4 MG/DL (ref 0.2–1.3)
BUN SERPL-MCNC: 4 MG/DL (ref 7–30)
CALCIUM SERPL-MCNC: 8.7 MG/DL (ref 8.5–10.1)
CANCER AG27-29 SERPL-ACNC: 239 U/ML (ref 0–39)
CHLORIDE SERPL-SCNC: 103 MMOL/L (ref 94–109)
CO2 SERPL-SCNC: 29 MMOL/L (ref 20–32)
CREAT SERPL-MCNC: 0.64 MG/DL (ref 0.52–1.04)
DIFFERENTIAL METHOD BLD: ABNORMAL
EOSINOPHIL # BLD AUTO: 0 10E9/L (ref 0–0.7)
EOSINOPHIL NFR BLD AUTO: 0.4 %
ERYTHROCYTE [DISTWIDTH] IN BLOOD BY AUTOMATED COUNT: 14.2 % (ref 10–15)
GFR SERPL CREATININE-BSD FRML MDRD: >90 ML/MIN/{1.73_M2}
GLUCOSE SERPL-MCNC: 137 MG/DL (ref 70–99)
HCT VFR BLD AUTO: 39 % (ref 35–47)
HGB BLD-MCNC: 12.6 G/DL (ref 11.7–15.7)
IMM GRANULOCYTES # BLD: 0 10E9/L (ref 0–0.4)
IMM GRANULOCYTES NFR BLD: 0.4 %
LYMPHOCYTES # BLD AUTO: 0.4 10E9/L (ref 0.8–5.3)
LYMPHOCYTES NFR BLD AUTO: 14.4 %
MAGNESIUM SERPL-MCNC: 2.2 MG/DL (ref 1.6–2.3)
MCH RBC QN AUTO: 28.3 PG (ref 26.5–33)
MCHC RBC AUTO-ENTMCNC: 32.3 G/DL (ref 31.5–36.5)
MCV RBC AUTO: 87 FL (ref 78–100)
MONOCYTES # BLD AUTO: 0.5 10E9/L (ref 0–1.3)
MONOCYTES NFR BLD AUTO: 17.9 %
NEUTROPHILS # BLD AUTO: 1.9 10E9/L (ref 1.6–8.3)
NEUTROPHILS NFR BLD AUTO: 66.2 %
PLATELET # BLD AUTO: 180 10E9/L (ref 150–450)
POTASSIUM SERPL-SCNC: 3.5 MMOL/L (ref 3.4–5.3)
PROT SERPL-MCNC: 6.6 G/DL (ref 6.8–8.8)
RBC # BLD AUTO: 4.46 10E12/L (ref 3.8–5.2)
SODIUM SERPL-SCNC: 137 MMOL/L (ref 133–144)
WBC # BLD AUTO: 2.9 10E9/L (ref 4–11)

## 2020-04-28 PROCEDURE — 86481 TB AG RESPONSE T-CELL SUSP: CPT | Performed by: INTERNAL MEDICINE

## 2020-04-28 PROCEDURE — 86300 IMMUNOASSAY TUMOR CA 15-3: CPT | Performed by: INTERNAL MEDICINE

## 2020-04-28 PROCEDURE — 96367 TX/PROPH/DG ADDL SEQ IV INF: CPT | Performed by: INTERNAL MEDICINE

## 2020-04-28 PROCEDURE — 99215 OFFICE O/P EST HI 40 MIN: CPT | Mod: GT | Performed by: INTERNAL MEDICINE

## 2020-04-28 PROCEDURE — 99000 SPECIMEN HANDLING OFFICE-LAB: CPT | Performed by: INTERNAL MEDICINE

## 2020-04-28 PROCEDURE — 86255 FLUORESCENT ANTIBODY SCREEN: CPT | Performed by: INTERNAL MEDICINE

## 2020-04-28 PROCEDURE — 86778 TOXOPLASMA ANTIBODY IGM: CPT | Mod: 90 | Performed by: INTERNAL MEDICINE

## 2020-04-28 PROCEDURE — 82164 ANGIOTENSIN I ENZYME TEST: CPT | Mod: 90 | Performed by: INTERNAL MEDICINE

## 2020-04-28 PROCEDURE — 86777 TOXOPLASMA ANTIBODY: CPT | Mod: 90 | Performed by: INTERNAL MEDICINE

## 2020-04-28 PROCEDURE — 99207 ZZC NO CHARGE LOS: CPT

## 2020-04-28 PROCEDURE — 85549 MURAMIDASE: CPT | Mod: 90 | Performed by: INTERNAL MEDICINE

## 2020-04-28 PROCEDURE — 80053 COMPREHEN METABOLIC PANEL: CPT | Performed by: INTERNAL MEDICINE

## 2020-04-28 PROCEDURE — 86611 BARTONELLA ANTIBODY: CPT | Mod: 90 | Performed by: INTERNAL MEDICINE

## 2020-04-28 PROCEDURE — 86618 LYME DISEASE ANTIBODY: CPT | Performed by: INTERNAL MEDICINE

## 2020-04-28 PROCEDURE — 81373 HLA I TYPING 1 LOCUS LR: CPT | Performed by: INTERNAL MEDICINE

## 2020-04-28 PROCEDURE — 96409 CHEMO IV PUSH SNGL DRUG: CPT | Performed by: INTERNAL MEDICINE

## 2020-04-28 PROCEDURE — 85025 COMPLETE CBC W/AUTO DIFF WBC: CPT | Performed by: INTERNAL MEDICINE

## 2020-04-28 PROCEDURE — 99207 ZZC NO CHARGE NURSE ONLY: CPT

## 2020-04-28 PROCEDURE — 83735 ASSAY OF MAGNESIUM: CPT | Performed by: INTERNAL MEDICINE

## 2020-04-28 PROCEDURE — 86431 RHEUMATOID FACTOR QUANT: CPT | Performed by: INTERNAL MEDICINE

## 2020-04-28 PROCEDURE — 86780 TREPONEMA PALLIDUM: CPT | Performed by: INTERNAL MEDICINE

## 2020-04-28 PROCEDURE — 86038 ANTINUCLEAR ANTIBODIES: CPT | Performed by: INTERNAL MEDICINE

## 2020-04-28 RX ORDER — METHYLPREDNISOLONE SODIUM SUCCINATE 125 MG/2ML
125 INJECTION, POWDER, LYOPHILIZED, FOR SOLUTION INTRAMUSCULAR; INTRAVENOUS
Status: CANCELLED
Start: 2020-04-28

## 2020-04-28 RX ORDER — HEPARIN SODIUM (PORCINE) LOCK FLUSH IV SOLN 100 UNIT/ML 100 UNIT/ML
5 SOLUTION INTRAVENOUS
Status: CANCELLED | OUTPATIENT
Start: 2020-04-28

## 2020-04-28 RX ORDER — SODIUM CHLORIDE 9 MG/ML
1000 INJECTION, SOLUTION INTRAVENOUS CONTINUOUS PRN
Status: CANCELLED
Start: 2020-04-28

## 2020-04-28 RX ORDER — EPINEPHRINE 0.3 MG/.3ML
0.3 INJECTION SUBCUTANEOUS EVERY 5 MIN PRN
Status: CANCELLED | OUTPATIENT
Start: 2020-05-05

## 2020-04-28 RX ORDER — LORAZEPAM 2 MG/ML
0.5 INJECTION INTRAMUSCULAR EVERY 4 HOURS PRN
Status: CANCELLED
Start: 2020-05-05

## 2020-04-28 RX ORDER — HEPARIN SODIUM (PORCINE) LOCK FLUSH IV SOLN 100 UNIT/ML 100 UNIT/ML
5 SOLUTION INTRAVENOUS
Status: DISCONTINUED | OUTPATIENT
Start: 2020-04-28 | End: 2020-04-28 | Stop reason: HOSPADM

## 2020-04-28 RX ORDER — MEPERIDINE HYDROCHLORIDE 25 MG/ML
25 INJECTION INTRAMUSCULAR; INTRAVENOUS; SUBCUTANEOUS EVERY 30 MIN PRN
Status: CANCELLED | OUTPATIENT
Start: 2020-04-28

## 2020-04-28 RX ORDER — ALBUTEROL SULFATE 90 UG/1
1-2 AEROSOL, METERED RESPIRATORY (INHALATION)
Status: CANCELLED
Start: 2020-05-05

## 2020-04-28 RX ORDER — DIPHENHYDRAMINE HYDROCHLORIDE 50 MG/ML
50 INJECTION INTRAMUSCULAR; INTRAVENOUS
Status: CANCELLED
Start: 2020-04-28

## 2020-04-28 RX ORDER — ALBUTEROL SULFATE 0.83 MG/ML
2.5 SOLUTION RESPIRATORY (INHALATION)
Status: CANCELLED | OUTPATIENT
Start: 2020-05-05

## 2020-04-28 RX ORDER — HEPARIN SODIUM,PORCINE 10 UNIT/ML
5 VIAL (ML) INTRAVENOUS
Status: CANCELLED | OUTPATIENT
Start: 2020-04-28

## 2020-04-28 RX ORDER — ALBUTEROL SULFATE 0.83 MG/ML
2.5 SOLUTION RESPIRATORY (INHALATION)
Status: CANCELLED | OUTPATIENT
Start: 2020-04-28

## 2020-04-28 RX ORDER — HEPARIN SODIUM (PORCINE) LOCK FLUSH IV SOLN 100 UNIT/ML 100 UNIT/ML
5 SOLUTION INTRAVENOUS
Status: CANCELLED | OUTPATIENT
Start: 2020-05-05

## 2020-04-28 RX ORDER — NALOXONE HYDROCHLORIDE 0.4 MG/ML
.1-.4 INJECTION, SOLUTION INTRAMUSCULAR; INTRAVENOUS; SUBCUTANEOUS
Status: CANCELLED | OUTPATIENT
Start: 2020-05-05

## 2020-04-28 RX ORDER — EPINEPHRINE 1 MG/ML
0.3 INJECTION, SOLUTION INTRAMUSCULAR; SUBCUTANEOUS EVERY 5 MIN PRN
Status: CANCELLED | OUTPATIENT
Start: 2020-05-05

## 2020-04-28 RX ORDER — METHYLPREDNISOLONE SODIUM SUCCINATE 125 MG/2ML
125 INJECTION, POWDER, LYOPHILIZED, FOR SOLUTION INTRAMUSCULAR; INTRAVENOUS
Status: CANCELLED
Start: 2020-05-05

## 2020-04-28 RX ORDER — EPINEPHRINE 0.3 MG/.3ML
0.3 INJECTION SUBCUTANEOUS EVERY 5 MIN PRN
Status: CANCELLED | OUTPATIENT
Start: 2020-04-28

## 2020-04-28 RX ORDER — ALBUTEROL SULFATE 90 UG/1
1-2 AEROSOL, METERED RESPIRATORY (INHALATION)
Status: CANCELLED
Start: 2020-04-28

## 2020-04-28 RX ORDER — NALOXONE HYDROCHLORIDE 0.4 MG/ML
.1-.4 INJECTION, SOLUTION INTRAMUSCULAR; INTRAVENOUS; SUBCUTANEOUS
Status: CANCELLED | OUTPATIENT
Start: 2020-04-28

## 2020-04-28 RX ORDER — DIPHENHYDRAMINE HYDROCHLORIDE 50 MG/ML
50 INJECTION INTRAMUSCULAR; INTRAVENOUS
Status: CANCELLED
Start: 2020-05-05

## 2020-04-28 RX ORDER — HEPARIN SODIUM,PORCINE 10 UNIT/ML
5 VIAL (ML) INTRAVENOUS
Status: CANCELLED | OUTPATIENT
Start: 2020-05-05

## 2020-04-28 RX ORDER — LORAZEPAM 2 MG/ML
0.5 INJECTION INTRAMUSCULAR EVERY 4 HOURS PRN
Status: CANCELLED
Start: 2020-04-28

## 2020-04-28 RX ORDER — SODIUM CHLORIDE 9 MG/ML
1000 INJECTION, SOLUTION INTRAVENOUS CONTINUOUS PRN
Status: CANCELLED
Start: 2020-05-05

## 2020-04-28 RX ORDER — MEPERIDINE HYDROCHLORIDE 25 MG/ML
25 INJECTION INTRAMUSCULAR; INTRAVENOUS; SUBCUTANEOUS EVERY 30 MIN PRN
Status: CANCELLED | OUTPATIENT
Start: 2020-05-05

## 2020-04-28 RX ORDER — EPINEPHRINE 1 MG/ML
0.3 INJECTION, SOLUTION INTRAMUSCULAR; SUBCUTANEOUS EVERY 5 MIN PRN
Status: CANCELLED | OUTPATIENT
Start: 2020-04-28

## 2020-04-28 RX ADMIN — HEPARIN SODIUM (PORCINE) LOCK FLUSH IV SOLN 100 UNIT/ML 5 ML: 100 SOLUTION at 14:12

## 2020-04-28 RX ADMIN — Medication 250 ML: at 13:22

## 2020-04-28 RX ADMIN — HEPARIN SODIUM (PORCINE) LOCK FLUSH IV SOLN 100 UNIT/ML 5 ML: 100 SOLUTION at 12:08

## 2020-04-28 ASSESSMENT — PAIN SCALES - GENERAL
PAINLEVEL: NO PAIN (0)
PAINLEVEL: MODERATE PAIN (4)

## 2020-04-28 NOTE — PROGRESS NOTES
Infusion Nursing Note:  Shaneka GARCÍA Alvarez presents today for C5D1 Erubulin.    Patient seen by provider today: Yes: DR. Zepeda   present during visit today: Not Applicable.    Note: N/A.    Intravenous Access:  Implanted Port.    Treatment Conditions:  Lab Results   Component Value Date    HGB 12.6 04/28/2020     Lab Results   Component Value Date    WBC 2.9 04/28/2020      Lab Results   Component Value Date    ANEU 1.9 04/28/2020     Lab Results   Component Value Date     04/28/2020      Results reviewed, labs MET treatment parameters, ok to proceed with treatment.      Post Infusion Assessment:  Patient tolerated infusion without incident.  Blood return noted pre and post infusion.  Site patent and intact, free from redness, edema or discomfort.  No evidence of extravasations.  Access discontinued per protocol.       Discharge Plan:   Discharge instructions reviewed with: Patient.  Patient and/or family verbalized understanding of discharge instructions and all questions answered.  Patient discharged in stable condition accompanied by: self.  Departure Mode: Ambulatory.    Keira Mixon RN

## 2020-04-28 NOTE — PROGRESS NOTES
Port needle left accessed for treatment. Tolerated port access and draw without complaint. Port site scrubbed with Chloraprep for 30 seconds and allowed to dry completely prior to dressing application. Accessed using sterile technique. Burns tubes drawn-Red gel/Green/Purple tubes and tubes for outside eye doctor (Dr. Turner)  Double signed by patient and RN. See documentation flowsheet. Mariah Paniagua, RN, BSN, OCN

## 2020-04-28 NOTE — PROGRESS NOTES
ONCOLOGY FOLLOW UP NOTE: 4/28/2020        I took the history from reviewing the previous notes that she was following with Dr. Amaya.  I have copied and updated from prior notes.    ONCOLOGY History:    1.  January 2006:  Diagnosed with Stage IIB, T2 N1 M0 invasive lobular carcinoma of the right breast.  Final pathology showed a 4.5 x 3.8 x 2.5 cm, 01/14 lymph nodes positive.  Estrogen, progesterone receptor positive, HER-2 negative.     2.  Genetics.  BRCA1 and 2 mutations not detected. 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       THERAPY TO DATE:   1.  2006:  ECOG 2104 protocol of dose-dense Adriamycin, Cytoxan and Avastin x4 cycles followed by Taxol and Avastin x4 cycles.   2.  03/2007:  Completed 1 year Avastin.   3.  11/2006-01/2007:  Radiotherapy to the right breast of 5040 cGy.   4.  [id number]:  Aromasin.  Stopped after moving to the [facility].   5.  01/2016:  Right modified radical mastectomy with latissimus dorsi flap reconstruction and a left prophylactic mastectomy with latissimus dorsi flap reconstruction.     She recently had MRI of the brain as a follow-up for multiple sclerosis and there were multiple calvarial lesions noted which was suspicious for metastatic disease.  There was no evidence of new multiple sclerosis lesions.  She had a PET scan on 8/30/2019 which showed widespread bone metastatic lesions (calvarium, spine, sacrum, pelvis, ribs most prominent at C7, T3, L1 and L4), hypermetabolic 3 cm lesion in LLL, and mediastinal/hilar LN. CEA wnl at 1.9 and C27-29 elevated to 415 (28 on 8/23/16). A CT guided biopsy of the right iliac bone was obtained on 9/4/19, pathology consistent with metastatic adenocarcinoma (breast), ER/AL negative, HER-2 negative PDL 1 < 1%. A second biopsy was take of the LLL nodule via EBUS on 9/5/19 did not show any malignancy.    C/T/L spine MRI 8/3/19 to 9/2/19 with numerous enhancing lesions of the C, T and L spine, largest around T9 and L1. No  evidence of cord compression. Has a L1 compression fracture and impending L4.     Received radiation T12-L5 3000 cGy in 10 fractions from 9/6/2019 till 9/18/2019.  Neurosurgery recommended no surgical intervention but to wear Boykins brace when out of bed and HOB >30 degrees    She started palliative Xeloda 2000/1500 on 10/1/2019 but after just a few doses she noticed nausea, red eyes blurred vision, loss of appetite.  She stopped taking it after 5 doses and was seen by nurse practitioner on 10/7/2019 when she was improving.  At that point she was restarted on Xeloda at a lower dose of 1000 mg twice a day.  As she was not able to tolerate even the lower dose with extreme nausea and vomiting and feeling extremely fatigued and blurry vision, we decided on stopping Xeloda.    We decided on repeating scans and MRI brain on 10/25/2019 showed no intracranial metastatic disease.   Multiple enhancing calvarial lesions may be increased in number and are suggestive of metastatic disease. Thinner imaging on the current study may identify the smaller lesions and may be responsible for  identified more lesions.   There is a single focus of T2 hyperintense signal within the anterior right temporal lobe may represent interval demyelination. There is no evidence for active demyelination.    PET/CT on 11/1/2019 showed favorable response to therapy and Overall FDG uptake within scattered osseous metastases is decreased since 8/30/2019, particularly within the pelvis. Increased sclerotic appearance of L1 and L4 pathologic compression deformities, likely sequela of recently completed palliative radiation therapy.    No significant FDG uptake in previously demonstrated hypermetabolic mediastinal lymph nodes. Biopsy negative on 9/5/2019.  There is also decreased FDG uptake in the left lower lobe (biopsy negative for  malignancy), now with dense consolidation containing air bronchograms suggestive of infection versus aspiration.  There is  diffuse FDG uptake within the esophagus, consistent with esophagitis.    Because of intolerance to Xeloda we decided on switching to weekly paclitaxel on 11/5/2019.    C#2 Taxol 12/4/19- started with 70mg/m2 dose reduction    C#3 Taxol 12/30/2019     PET/CT on 1/24/2020 showed progression of the disease in some of the bone lesions including increase in size and lytic lesion within the vertebral body of C4 measuring 1 cm as opposed to 0.6 cm previously and a new central soft tissue nodule with SUV max 4.1 when previously it was non-hypermetabolic.  There is also some progression noted in the right proximal femur and right iliac bone.  There is decreased metabolic uptake in the right lamina of T9 with SUV max 4.7 when previously it was 8.0.  Other lesions are stable.    CA 27-29 also had increased significantly and it was 257 on 1/28/2020.    We decided on switching to eribulin on 1/28/2020.    C#2 2/18/2020  C#2 D#8 2/25/2020    C#3 D#1 3/18/2020 -delayed by 1 week as per patient's preference because she wanted to visit her family.    She had a repeat PET/CT on 3/27/2020 which showed stable size of the bone metastatic lesions although the FDG avidity was less, consistent with treatment response.    She had MRI of the thoracic spine on 4/3/2020 and it was compared to the one which was done in August 2019 and it showed increased size of several metastatic lesions most notably at T9 but also at T5-T7.  Other lesions at T10, T11 and T12 appeared improved.    CA 27-29 was also down to 222 on 3/18/2020.    Cycle #4 eribulin ( dose reduced to 1.2 mg/m2 )-4/7/2020-   Cycle #5 Eribulin ( 1.2 mg/m2 )- 4/28/2020       I had also recommended starting Zometa so she got dental clearance to start with that.  She received Zometa on 10/23/2019 and 1/14/2020 and 4/7/2020 on a 3 monthly schedule.      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  It was thought that this could be due to Taxol as  patient reported to the ophthalmologist that she was on Taxol in September 2019 when her symptoms started.  But she was not on Taxol in September 2019 when she started noticing the visual changes so Taxol is not responsible for this.  The first dose of Taxol was on 11/5/2019.       Interval history.  This is a video visit.  Amwell did not work so we tried Doximity.  This chemo went well. Had little nausea and compazine helped.   Pain is under fair control and she takes MS Contin at night and morphine immediate release in the evening.  No new pain.  Bowel movements are fine.  Neuropathy is mild and the same.  Overall energy is decent.  He denies any infections.  She continues to have issues with vision and she is following with ophthalmologist.  She is using steroid eyedrops.    She has baseline balance issues and heat sensitivity and weakness in the legs from multiple sclerosis.        ECOG 1    ROS:  A comprehensive ROS was otherwise neg      I reviewed other history in epic as below.       PAST MEDICAL HISTORY:     1.  Breast cancer  2.  Multiple sclerosis.   3.  Depression.   4.  Migraines.   5.  Hypertension.   6.  Cholecystectomy and umbilical hernia repair.     SOCIAL HISTORY: She smoked for 5 years but quit many years ago.  Drinks alcohol socially.  She lives with her .  She is a teacher in middle school.       FAMILY HISTORY: She mentions that her mother had breast cancer at 49.  Both grandmothers had breast cancer but she is not sure of the age.  A couple of cousins have cancers.  Patient has 3 children who are healthy.    Current Outpatient Medications   Medication     acetaminophen (TYLENOL) 500 MG tablet     busPIRone (BUSPAR) 15 MG tablet     calcium citrate-vitamin D (CITRACAL) 315-250 MG-UNIT TABS     Cholecalciferol (VITAMIN D3 PO)     cyclopentolate (CYCLOGYL) 1 % ophthalmic solution     diclofenac (VOLTAREN) 1 % topical gel     ibuprofen (ADVIL/MOTRIN) 600 MG tablet     LORazepam (ATIVAN)  "0.5 MG tablet     magnesium 250 MG tablet     morphine (MS CONTIN) 15 MG CR tablet     morphine (MSIR) 15 MG IR tablet     naloxone (NARCAN) 1 mg/mL for intranasal kit (2 syringes with 2 mucosal atomizer device)     polyethylene glycol (MIRALAX/GLYCOLAX) packet     prochlorperazine (COMPAZINE) 10 MG tablet     propranolol (INDERAL LA) 60 MG 24 hr capsule     senna-docusate (SENOKOT-S/PERICOLACE) 8.6-50 MG tablet     syringe/needle, disp, (B-D INTEGRA SYRINGE) 23G X 1\" 3 ML MISC     traZODone (DESYREL) 50 MG tablet     venlafaxine (EFFEXOR-ER) 225 MG TB24 24 hr tablet     No current facility-administered medications for this visit.      Facility-Administered Medications Ordered in Other Visits   Medication     heparin 100 UNIT/ML injection 5 mL     sodium chloride (PF) 0.9% PF flush 10-20 mL        Allergies   Allergen Reactions     Bactrim [Sulfamethoxazole W/Trimethoprim]      Internal bleeding     Copaxone [Glatiramer] Hives          PHYSICAL EXAMINATION:     /80   Temp 98.1  F (36.7  C)   Wt 63 kg (138 lb 12.8 oz)   SpO2 98%   BMI 25.39 kg/m    Wt Readings from Last 4 Encounters:   04/28/20 63 kg (138 lb 12.8 oz)   04/14/20 63.9 kg (140 lb 14.4 oz)   04/07/20 63 kg (139 lb)   03/27/20 66.7 kg (147 lb)           Constitutional.  Does not seem to be in any acute distress.  Eyes.  Mild conjunctival redness noted on the left side.  Respiratory.  Speaking in full sentences.  Breathing seems comfortable without any accessory use of muscles.    Skin.  Visualized his skin does not show any obvious rashes.  Neurological.  Alert and oriented x3.  Psychiatric.  Mood, mentation and affect are normal.  Decision making capacity is intact.      The rest of a comprehensive physical examination is deferred due to Public Health Emergency video visit restrictions.        Labs and Imaging.    Reviewed    Results for RICCARDO KYLE N (MRN [id number]) as of 4/28/2020 12:58   Ref. Range 4/28/2020 12:12   Sodium Latest Ref " Range: 133 - 144 mmol/L 137   Potassium Latest Ref Range: 3.4 - 5.3 mmol/L 3.5   Chloride Latest Ref Range: 94 - 109 mmol/L 103   Carbon Dioxide Latest Ref Range: 20 - 32 mmol/L 29   Urea Nitrogen Latest Ref Range: 7 - 30 mg/dL 4 (L)   Creatinine Latest Ref Range: 0.52 - 1.04 mg/dL 0.64   GFR Estimate Latest Ref Range: >60 mL/min/1.73_m2 >90   GFR Estimate If Black Latest Ref Range: >60 mL/min/1.73_m2 >90   Calcium Latest Ref Range: 8.5 - 10.1 mg/dL 8.7   Anion Gap Latest Ref Range: 3 - 14 mmol/L 5   Magnesium Latest Ref Range: 1.6 - 2.3 mg/dL 2.2   Albumin Latest Ref Range: 3.4 - 5.0 g/dL 3.4   Protein Total Latest Ref Range: 6.8 - 8.8 g/dL 6.6 (L)   Bilirubin Total Latest Ref Range: 0.2 - 1.3 mg/dL 0.4   Alkaline Phosphatase Latest Ref Range: 40 - 150 U/L 78   ALT Latest Ref Range: 0 - 50 U/L 17   AST Latest Ref Range: 0 - 45 U/L 19   Glucose Latest Ref Range: 70 - 99 mg/dL 137 (H)   WBC Latest Ref Range: 4.0 - 11.0 10e9/L 2.9 (L)   Hemoglobin Latest Ref Range: 11.7 - 15.7 g/dL 12.6   Hematocrit Latest Ref Range: 35.0 - 47.0 % 39.0   Platelet Count Latest Ref Range: 150 - 450 10e9/L 180   RBC Count Latest Ref Range: 3.8 - 5.2 10e12/L 4.46   MCV Latest Ref Range: 78 - 100 fl 87   MCH Latest Ref Range: 26.5 - 33.0 pg 28.3   MCHC Latest Ref Range: 31.5 - 36.5 g/dL 32.3   RDW Latest Ref Range: 10.0 - 15.0 % 14.2   Diff Method Unknown Automated Method   % Neutrophils Latest Units: % 66.2   % Lymphocytes Latest Units: % 14.4   % Monocytes Latest Units: % 17.9   % Eosinophils Latest Units: % 0.4   % Basophils Latest Units: % 0.7   % Immature Granulocytes Latest Units: % 0.4   Absolute Neutrophil Latest Ref Range: 1.6 - 8.3 10e9/L 1.9   Absolute Lymphocytes Latest Ref Range: 0.8 - 5.3 10e9/L 0.4 (L)   Absolute Monocytes Latest Ref Range: 0.0 - 1.3 10e9/L 0.5   Absolute Eosinophils Latest Ref Range: 0.0 - 0.7 10e9/L 0.0   Absolute Basophils Latest Ref Range: 0.0 - 0.2 10e9/L 0.0         MR CERVICAL SPINE W/O & W CONTRAST  1/31/2020 11:04 AM     Provided History: left arm tingling- hx of breast cancer with spine  mets; Carcinoma of breast metastatic to bone, unspecified laterality  (H); Carcinoma of breast metastatic to bone, unspecified laterality  (H); Numbness and tingling in left arm; Numbness and tingling in left  arm  ICD-10: Carcinoma of breast metastatic to bone, unspecified laterality  (H); Carcinoma of breast metastatic to bone, unspecified laterality  (H); Numbness and tingling in left arm; Numbness and tingling in left  arm     Comparison: 19 2019     Technique: Sagittal T1-weighted, sagittal T2-weighted, sagittal  diffusion weighted, axial T2-weighted, and axial T2* gradient echo  images of the cervical spine were obtained without intravenous  contrast. Following intravenous administration of gadolinium, axial  and sagittal T1-weighted images with fat saturation were also  obtained.     Contrast: 7 ml Gadavist     Findings:  The cervical vertebrae are normally aligned. Mild straightening of the  normal cervical lordosis. There is no disc height narrowing at any  level.  There is normal signal within and normal contour of the  cervical spinal cord.     There are multiple enhancing foci within the cervical vertebral bodies  and posterior elements which have increased in size since the prior  study. For example the lesion in C7 is mildly increased measuring 11 x  10 mm where this previously measured 10 x 9 mm. The lesion in C4 now  measures 9 x 9 mm were this previously measured 5 x 5 mm. There are  lesions in the vertebral bodies of C2, C4, C6, C7, T1, T2, T3 and the  posterior elements of C3, C5 and C6. There is no epidural or neural  foraminal involvement. The degenerative changes in the cervical spine  are stable.                                                                      Impression:   1. Mild progression of the lesions in the cervical and upper thoracic  spine compared to the prior. No epidural or neural  foraminal  involvement is seen..  2. Mild cervical spondylosis, unchanged. No significant spinal canal  stenosis or neural foraminal narrowing.      ASSESSMENT AND PLAN:   1.  History of stage IIB, T2 N1 M0 invasive lobular carcinoma of the right breast.  It was initially diagnosed in 2006 and at that time it was hormone receptor positive, HER-2 negative.  She was treated on ECOG 2104 protocol with dose-dense Adriamycin, Cytoxan and Avastin x4 cycles followed by Taxol and Avastin x4 cycles followed by a Avastin for 1 year.  She also received radiation to the right breast which she completed in January 2007 (5040 cGy).  She took Aromasin from 2007 to 2014.    Now she has metastatic breast cancer with extensive involvement of the bones.  There is also a left lower lobe hypermetabolic lesion and mediastinal lymph nodes which are hypermetabolic.  The biopsy from the right iliac bone is consistent with metastatic lobular breast cancer but it is hormone receptor and HER-2 negative and PDL 1 is also negative.    She has received 3000 cGy of palliative radiation to T12-L5 from 9/6/2019 till 9/18/2019.    She was started on palliative Xeloda but she was unable to tolerate even the dose reduced medication.        We decided on repeating scans and MRI brain on 10/25/2019 showed no intracranial metastatic disease.   Multiple enhancing calvarial lesions may be increased in number and are suggestive of metastatic disease. Thinner imaging on the current study may identify the smaller lesions and may be responsible for identified more lesions.   There is a single focus of T2 hyperintense signal within the anterior right temporal lobe may represent interval demyelination. There is no evidence for active demyelination.    PET/CT on 11/1/2019 showed favorable response to therapy and Overall FDG uptake within scattered osseous metastases is decreased since 8/30/2019, particularly within the pelvis. Increased sclerotic appearance of L1  and L4 pathologic compression deformities, likely sequela of recently completed palliative radiation therapy.    No significant FDG uptake in previously demonstrated hypermetabolic mediastinal lymph nodes. Biopsy negative on 9/5/2019.  There is also decreased FDG uptake in the left lower lobe (biopsy negative for malignancy), now with dense consolidation containing air bronchograms suggestive of infection versus aspiration.  There is diffuse FDG uptake within the esophagus, consistent with esophagitis.    Because of intolerance to Xeloda we decided on switching to weekly paclitaxel on 11/5/2019.    For better tolerance we decreased the dose of paclitaxel to 70 mg/m  with cycle #2.  She has completed 3 cycles of Taxol and the last chemotherapy was on 1/14/2020.      PET/CT on 1/24/2020 showed progression of the disease in some of the bone lesions including increase in size and lytic lesion within the vertebral body of C4 measuring 1 cm as opposed to 0.6 cm previously and a new central soft tissue nodule with SUV max 4.1 when previously it was non-hypermetabolic.  There is also some progression noted in the right proximal femur and right iliac bone.  There is decreased metabolic uptake in the right lamina of T9 with SUV max 4.7 when previously it was 8.0.  Other lesions are stable.    There are no pathologically enlarged mediastinal, hilar or axillary lymph nodes. Calcified subcarinal lymph nodes. There are no suspicious lung nodules or evidence for infection and previous left lower lobe consolidation has resolved.     CA 27-29 also had increased significantly and it was 257 on 1/28/2020.    Due to progression we switched to eribulin on 1/28/2020.        After 3 cycles, she had a repeat PET/CT on 3/27/2020 which showed a stable size of the bone metastatic lesions although the FDG avidity was less, consistent with treatment response.    She had MRI of the thoracic spine on 4/3/2020 and it was compared to the one which  was done in August 2019 and it showed increased size of several metastatic lesions most notably at T9 but also at T5-T7.  Other lesions at T10, T11 and T12 appeared improved.    CA 27-29 was also down to 222 on 3/18/2020.    I believe overall this is consistent with a partial response to the treatment.  Overall she is tolerating eribulin well with some worsening of neuropathy and cytopenias.    We discussed the situation in detail.  We decided on continuing the chemotherapy with some modifications and she got cycle #4 with slightly decreased dose of eribulin to 1.2 mg/m2.      She tolerated the fourth cycle well.    She will get C#5 today.  We will keep the same dose of eribulin.    Recently FDA approved sacituzumab govitecan-hziy (Trodelvy) for TNBC, based on the results of the phase II IMMU-132-01 clinical trial. So this might become an option for her in the future.     Leukopenia.  Neutrophil count is normal.  She basically has lymphocytopenia.  Continue the same dose of chemotherapy.      Nausea.  This is under very good control with as needed Compazine.      Neuropathy.  Her neuropathy is mild and she has tingling in her fingers.  It has not worsened over the last 3 weeks.  Continue to observe.  She has baseline balance issues and heat/cold sensitivity from multiple sclerosis.          Pain management.  This is from the widely metastatic breast cancer to the bones.  She is following with palliative care.  Currently she is on MS Contin at night and she takes short acting morphine in the evening.      Previously she got palliative radiation to T12-L5 3000 cGy in 10 fractions from 9/6/2019 till 9/18/2019.  She was evaluated by orthopedics Dr. Bipin Elliott on 11/1/2019 and conservative management was recommended.      Bone metastasis.  She received Zometa on 10/23/2019 and 01/14/2020 and 4/7/2020.  Continue vitamin D and calcium.        We did not address the following today.  Insomnia.  Trazodone is helping  and I refilled it today.        Vision changes.    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  It was thought that this could be due to Taxol as patient reported to the ophthalmologist that she was on Taxol in September 2019 when her symptoms started.  But she was not on Taxol in September 2019 when she started noticing the visual changes so Taxol is not responsible for this.  The first dose of Taxol was on 11/5/2019.   That being said we have now switched the treatment to eribulin.  Continue to follow with ophthalmology.  She was told that the vision would take several weeks to improve.  Increased FDG ability in the colon.  She had FDG uptake in the cecum and ascending colon but no corresponding lesion was seen on the CT scan.  She is completely asymptomatic so at this time we will continue to observe.    Fatigue.  This is from the cancer and chemotherapy.  I again encouraged her to do regular exercise.  Continue to follow with cancer rehab.        Discussion regarding healthcare directives.  On previous visit she told me that she will bring the health care directive for our records but she forgot so I told her to bring it on next visit.      Breast implant removal.  She tells me that she was planning to do breast implant removal because there was a recall on this.  Her surgery was scheduled for 9/24/2019.  Because of this new development of metastatic breast cancer and her recent radiation, surgery has been canceled.  We discussed that at this time treatment for metastatic breast cancer would take precedence over the other surgery as the other surgery would require her to be off chemotherapy for several weeks and in that case the chances of cancer progression would be high and I would not recommend that.    Multiple sclerosis.  She will continue to follow with her neurologist Dr. Quiles.  Currently multiple sclerosis is under control and currently she is not taking any  medications.    RTC in 3 weeks.    All questions answered.  She is agreeable and comfortable with the plan.        Dewayne Zepeda MD    Video start time. 1245 PM  Video Stop time.  1253 PM

## 2020-04-28 NOTE — NURSING NOTE
"Shaneka Alvarez is a 59 year old female who is being evaluated via a billable video visit.      The patient has been notified of following:     \"This video visit will be conducted via a call between you and your physician/provider. We have found that certain health care needs can be provided without the need for an in-person physical exam.  This service lets us provide the care you need with a video conversation.  If a prescription is necessary we can send it directly to your pharmacy.  If lab work is needed we can place an order for that and you can then stop by our lab to have the test done at a later time.    Video visits are billed at different rates depending on your insurance coverage.  Please reach out to your insurance provider with any questions.    If during the course of the call the physician/provider feels a video visit is not appropriate, you will not be charged for this service.\"    Patient has given verbal consent for Video visit? Yes    How would you like to obtain your AVS? Susan    Patient would like the video invitation sent by: Text to cell phone: [phone]    Will anyone else be joining your video visit? No        Video-Visit Details    Type of service:  Video Visit    Originating Location (pt. Location): Other In clinic prior to tx    Distant Location (provider location):  [facility]     Mode of Communication:  Video Conference via Sabrina Thakur CMA        "

## 2020-04-29 LAB
ACE SERPL-CCNC: 40 U/L (ref 9–67)
B BURGDOR IGG+IGM SER QL: <0.01 (ref 0–0.89)
RHEUMATOID FACT SER NEPH-ACNC: <7 IU/ML (ref 0–20)
T GONDII IGG SER-ACNC: <3 IU/ML
T GONDII IGM SER-ACNC: <3 AU/ML
T PALLIDUM AB SER QL: NONREACTIVE

## 2020-04-30 LAB
ANA SER QL IF: NEGATIVE
ANCA AB PATTERN SER IF-IMP: NORMAL
C-ANCA TITR SER IF: NORMAL {TITER}
GAMMA INTERFERON BACKGROUND BLD IA-ACNC: 0.04 IU/ML
M TB IFN-G BLD-IMP: NEGATIVE
M TB IFN-G CD4+ BCKGRND COR BLD-ACNC: >10 IU/ML
MITOGEN IGNF BCKGRD COR BLD-ACNC: 0 IU/ML
MITOGEN IGNF BCKGRD COR BLD-ACNC: 0 IU/ML

## 2020-05-01 LAB
A* LOCUS: NORMAL
A*: NORMAL
ABSSOP COMMENTS: NORMAL
ABTEST METHOD: NORMAL
B QUINTANA IGG TITR SER: NORMAL {TITER}
B QUINTANA IGM TITR SER: NORMAL {TITER}

## 2020-05-02 LAB — LYSOZYME SERPL-MCNC: 1.14 UG/ML

## 2020-05-04 ENCOUNTER — PATIENT OUTREACH (OUTPATIENT)
Dept: ONCOLOGY | Facility: CLINIC | Age: 60
End: 2020-05-04

## 2020-05-04 NOTE — TELEPHONE ENCOUNTER
FMAL forms completed for daughter, Nemesio Romero and faxed to [employer] at [phone] and was confirmed as received.

## 2020-05-05 ENCOUNTER — ONCOLOGY VISIT (OUTPATIENT)
Dept: ONCOLOGY | Facility: CLINIC | Age: 60
End: 2020-05-05
Payer: COMMERCIAL

## 2020-05-05 ENCOUNTER — INFUSION THERAPY VISIT (OUTPATIENT)
Dept: INFUSION THERAPY | Facility: CLINIC | Age: 60
End: 2020-05-05
Payer: COMMERCIAL

## 2020-05-05 VITALS
BODY MASS INDEX: 25.24 KG/M2 | TEMPERATURE: 98.5 F | RESPIRATION RATE: 16 BRPM | WEIGHT: 138 LBS | SYSTOLIC BLOOD PRESSURE: 127 MMHG | DIASTOLIC BLOOD PRESSURE: 89 MMHG | OXYGEN SATURATION: 99 % | HEART RATE: 77 BPM

## 2020-05-05 DIAGNOSIS — C50.912 BILATERAL MALIGNANT NEOPLASM OF BREAST IN FEMALE, UNSPECIFIED ESTROGEN RECEPTOR STATUS, UNSPECIFIED SITE OF BREAST (H): ICD-10-CM

## 2020-05-05 DIAGNOSIS — C50.919 METASTATIC BREAST CANCER: Primary | ICD-10-CM

## 2020-05-05 DIAGNOSIS — C50.911 BILATERAL MALIGNANT NEOPLASM OF BREAST IN FEMALE, UNSPECIFIED ESTROGEN RECEPTOR STATUS, UNSPECIFIED SITE OF BREAST (H): ICD-10-CM

## 2020-05-05 LAB
ALBUMIN SERPL-MCNC: 3.4 G/DL (ref 3.4–5)
ALP SERPL-CCNC: 76 U/L (ref 40–150)
ALT SERPL W P-5'-P-CCNC: 22 U/L (ref 0–50)
ANION GAP SERPL CALCULATED.3IONS-SCNC: 3 MMOL/L (ref 3–14)
AST SERPL W P-5'-P-CCNC: 19 U/L (ref 0–45)
BASOPHILS # BLD AUTO: 0 10E9/L (ref 0–0.2)
BASOPHILS NFR BLD AUTO: 1 %
BILIRUB SERPL-MCNC: 0.4 MG/DL (ref 0.2–1.3)
BUN SERPL-MCNC: 7 MG/DL (ref 7–30)
CALCIUM SERPL-MCNC: 8.6 MG/DL (ref 8.5–10.1)
CHLORIDE SERPL-SCNC: 105 MMOL/L (ref 94–109)
CO2 SERPL-SCNC: 29 MMOL/L (ref 20–32)
CREAT SERPL-MCNC: 0.66 MG/DL (ref 0.52–1.04)
DIFFERENTIAL METHOD BLD: ABNORMAL
ERYTHROCYTE [DISTWIDTH] IN BLOOD BY AUTOMATED COUNT: 13.9 % (ref 10–15)
GFR SERPL CREATININE-BSD FRML MDRD: >90 ML/MIN/{1.73_M2}
GLUCOSE SERPL-MCNC: 101 MG/DL (ref 70–99)
HCT VFR BLD AUTO: 38.9 % (ref 35–47)
HGB BLD-MCNC: 13.1 G/DL (ref 11.7–15.7)
LYMPHOCYTES # BLD AUTO: 0.6 10E9/L (ref 0.8–5.3)
LYMPHOCYTES NFR BLD AUTO: 14 %
MAGNESIUM SERPL-MCNC: 1.9 MG/DL (ref 1.6–2.3)
MCH RBC QN AUTO: 29.1 PG (ref 26.5–33)
MCHC RBC AUTO-ENTMCNC: 33.7 G/DL (ref 31.5–36.5)
MCV RBC AUTO: 86 FL (ref 78–100)
MONOCYTES # BLD AUTO: 0.1 10E9/L (ref 0–1.3)
MONOCYTES NFR BLD AUTO: 3 %
NEUTROPHILS # BLD AUTO: 3.6 10E9/L (ref 1.6–8.3)
NEUTROPHILS NFR BLD AUTO: 82 %
PLATELET # BLD AUTO: 176 10E9/L (ref 150–450)
PLATELET # BLD EST: ABNORMAL 10*3/UL
POTASSIUM SERPL-SCNC: 3.7 MMOL/L (ref 3.4–5.3)
PROT SERPL-MCNC: 6.7 G/DL (ref 6.8–8.8)
RBC # BLD AUTO: 4.5 10E12/L (ref 3.8–5.2)
RBC MORPH BLD: ABNORMAL
SODIUM SERPL-SCNC: 137 MMOL/L (ref 133–144)
WBC # BLD AUTO: 4.3 10E9/L (ref 4–11)

## 2020-05-05 PROCEDURE — 85025 COMPLETE CBC W/AUTO DIFF WBC: CPT | Performed by: INTERNAL MEDICINE

## 2020-05-05 PROCEDURE — 99207 ZZC NO CHARGE LOS: CPT

## 2020-05-05 PROCEDURE — 83735 ASSAY OF MAGNESIUM: CPT | Performed by: INTERNAL MEDICINE

## 2020-05-05 PROCEDURE — 80053 COMPREHEN METABOLIC PANEL: CPT | Performed by: INTERNAL MEDICINE

## 2020-05-05 PROCEDURE — 99207 ZZC NO CHARGE NURSE ONLY: CPT

## 2020-05-05 PROCEDURE — 96401 CHEMO ANTI-NEOPL SQ/IM: CPT | Performed by: INTERNAL MEDICINE

## 2020-05-05 PROCEDURE — 96365 THER/PROPH/DIAG IV INF INIT: CPT | Performed by: INTERNAL MEDICINE

## 2020-05-05 RX ORDER — HEPARIN SODIUM (PORCINE) LOCK FLUSH IV SOLN 100 UNIT/ML 100 UNIT/ML
5 SOLUTION INTRAVENOUS
Status: DISCONTINUED | OUTPATIENT
Start: 2020-05-05 | End: 2020-05-05 | Stop reason: HOSPADM

## 2020-05-05 RX ADMIN — HEPARIN SODIUM (PORCINE) LOCK FLUSH IV SOLN 100 UNIT/ML 5 ML: 100 SOLUTION at 10:38

## 2020-05-05 RX ADMIN — Medication 250 ML: at 11:28

## 2020-05-05 RX ADMIN — HEPARIN SODIUM (PORCINE) LOCK FLUSH IV SOLN 100 UNIT/ML 5 ML: 100 SOLUTION at 12:08

## 2020-05-05 ASSESSMENT — PAIN SCALES - GENERAL: PAINLEVEL: MODERATE PAIN (4)

## 2020-05-05 NOTE — PROGRESS NOTES
Infusion Nursing Note:  Shaneka Alvarez presents today for C5D8 Eribulin.    Patient seen by provider today: No   present during visit today: Not Applicable.    Note: N/A.    Intravenous Access:  Implanted Port.    Treatment Conditions:  Lab Results   Component Value Date    HGB 13.1 05/05/2020     Lab Results   Component Value Date    WBC 4.3 05/05/2020      Lab Results   Component Value Date    ANEU 1.9 04/28/2020     Lab Results   Component Value Date     05/05/2020      Lab Results   Component Value Date     05/05/2020                   Lab Results   Component Value Date    POTASSIUM 3.7 05/05/2020           Lab Results   Component Value Date    MAG 1.9 05/05/2020            Lab Results   Component Value Date    CR 0.66 05/05/2020                   Lab Results   Component Value Date    RAFAELA 8.6 05/05/2020                Lab Results   Component Value Date    BILITOTAL 0.4 05/05/2020           Lab Results   Component Value Date    ALBUMIN 3.4 05/05/2020                    Lab Results   Component Value Date    ALT 22 05/05/2020           Lab Results   Component Value Date    AST 19 05/05/2020       Post Infusion Assessment:  Patient tolerated infusion without incident.  Site patent and intact, free from redness, edema or discomfort.  No evidence of extravasations.  Access discontinued per protocol.       Discharge Plan:   Patient will return 5/19/20 for next appointment.   Patient discharged in stable condition accompanied by: self.  Departure Mode: Ambulatory.    Cristina Loaiza RN

## 2020-05-06 DIAGNOSIS — G89.3 CANCER ASSOCIATED PAIN: ICD-10-CM

## 2020-05-06 DIAGNOSIS — C50.919 METASTATIC BREAST CANCER: ICD-10-CM

## 2020-05-06 DIAGNOSIS — C50.911 BILATERAL MALIGNANT NEOPLASM OF BREAST IN FEMALE, UNSPECIFIED ESTROGEN RECEPTOR STATUS, UNSPECIFIED SITE OF BREAST (H): ICD-10-CM

## 2020-05-06 DIAGNOSIS — C50.912 BILATERAL MALIGNANT NEOPLASM OF BREAST IN FEMALE, UNSPECIFIED ESTROGEN RECEPTOR STATUS, UNSPECIFIED SITE OF BREAST (H): ICD-10-CM

## 2020-05-06 RX ORDER — LORAZEPAM 0.5 MG/1
0.5 TABLET ORAL EVERY 4 HOURS PRN
Qty: 30 TABLET | Refills: 2 | Status: SHIPPED | OUTPATIENT
Start: 2020-05-06 | End: 2020-12-15

## 2020-05-06 RX ORDER — MORPHINE SULFATE 15 MG/1
15 TABLET, FILM COATED, EXTENDED RELEASE ORAL EVERY EVENING
Qty: 30 TABLET | Refills: 0 | Status: SHIPPED | OUTPATIENT
Start: 2020-05-06 | End: 2020-06-05

## 2020-05-06 NOTE — TELEPHONE ENCOUNTER
Requested medications MS Contin     Last refill: 03/26   Last office visit: 04/01   Scheduled for follow up 05/13    Patient would like script  E prescribed     [place]  Report reviewed.

## 2020-05-11 NOTE — PROGRESS NOTES
"Palliative Care Outpatient Clinic Progress Note    Patient Name: Shaneka Alvarez is being evaluated via a billable video visit.    Primary Provider:  Mohit Barcenas  Primary Oncologist: Dr Zepeda  Last seen by palliative care: myself, 20      Chief Complaint: sadness    Medical History/Summary:  - breast cancer ER+/NV+, Her2 neg diagnosed in               - s/p systemic treatment with adriamycin, cytoxan, avastin, aromatase inhibitor as well as b/l mastectomy              - relapsed metastatic disease found in skull, vertebrae complicated by pathological fractures L1, L5              - s/p XRT to T12-L5 2019. Didn't tolerate Xeloda, paclitaxel 2019-2020, now on eribulin and zometa  - MS with mild right-sided weakness       Interim History:  On her last visit we discussed her pain (added MS Contin qPM, rotated from oxycodone to MSIR) and completed a POLST (DNR/selective treatments)    Patient is on the call by herself.     She shares that she is not feeling right. She has much sadness thinking about her future and a sense that she \"isn't doing it right\". We talk about this more today and I commend her for tackling the big question of what her future holds and with that of her mortality. She states that she would appreciate some guidance and support in this work. She identifies Gina Pineda, the clinic  as someone she feels she could connect with over this.     Physically she has been feeling quite well.     Social History:  Pertinent changes to social history/social situation since last visit: Her stepchildren's grandfather recently  of COVID. This has been hard for the family, especially in light of the restrictions around visiting.     She is looking forward to her son and his girlfriend are planning to visit in summer.    She is tentatively planning to go on a vacation with her daughter and son-in-law in late summer.     Key support resources: family    Advance Directive " Status:  POLST completed 20: DNR/selective treatments    Social History     Tobacco Use     Smoking status: Former Smoker     Types: Cigarettes     Last attempt to quit: 2005     Years since quittin.4     Smokeless tobacco: Never Used   Substance Use Topics     Alcohol use: Yes     Alcohol/week: 2.0 standard drinks     Types: 1 Glasses of wine, 1 Cans of beer per week     Comment: TWICE A WEEK     Drug use: No       Functional Status:  0 (Fully active, able to carry on all activities without restriction)      Impression & Recommendations & Counseliny/o woman with metastatic breast cancer    Existential sadness: she is doing much work around processing her illness, its progressive nature, and what this means for her future. She feels understandably overwhelmed by this and would like some guidance and support in this work.   - I will reach out to the clinic  Gina Pineda to connect with Shaneka   - Shaneka also identified her urge to live and enjoy life. She has much to look forward to in the near future      Return to clinic in 2 months    Data / Chart Review:    Review of Systems:   ROS: 10 point ROS neg other than the symptoms noted above in the HPI and pertinents here.         Physical Exam:   Physical Exam:  Vital Signs not checked, virtual visit    CONSTIT: awake, appears comfortable  EENT: MMM, EOMI, no icterus  RESP: reg, nl effort, no cough  SKIN: no rash, no obvious lesions  NEURO: alert, oriented x3  PSYCH: appropriate affect, memory and thought process intact    The rest of a comprehensive physical examination is deferred  due to public health emergency video visit restrictions.      Allergies   Allergen Reactions     Bactrim [Sulfamethoxazole W/Trimethoprim]      Internal bleeding     Copaxone [Glatiramer] Hives       Current pertinent medications:  MS Contin 15mg HS  MSIR 15-30mg q3h prn              Naloxone prescribed  acetaminophen 1000mg tid  Ibuprofen 600mg qid  prn  Diclofenac gel, lidocaine patch     Dexamethasone 4mg q12h     Venlafaxine 225mg daily  Lorazepam 0.5mg prn  Trazodone 50mg HS  Buspirone bid     Senna bid prn, miralax bid prn    : ok    Opioid safety assessment:   Expected prognosis >1 year  Risk: Low (ORT 0)  Indication for Opioid Use: Moderate or Strong  Baseline UDT performed on: not sent yet  Naloxone Script provided if patient also on benzodiazepine: 12/4/2019   Indications for Tapering reviewed: yes,3/26/20    Past Medical History:   Diagnosis Date     Breast cancer (H)     Age 44     Common migraine      H/O bilateral mastectomy      Mild major depression (H)      Multiple sclerosis (H)      Past Surgical History:   Procedure Laterality Date     ENDOBRONCHIAL ULTRASOUND FLEXIBLE N/A 9/5/2019    Procedure: Flexible Bronchoscopy, Endobronchial Ultrasound, Radial Probe;  Surgeon: Sree Sanchez MD;  Location: [facility] OR      REMOVAL OF OVARY/TUBE(S)       HERNIA REPAIR, UMBILICAL       mastectomy  Bilateral 2006     MASTECTOMY, BILATERAL           Lab and imaging data reviewed:  Comments:       Video-Visit Details    Type of service:  Video Visit    Physician has received verbal consent for a Video Visit from the patient? Yes    Video Start Time: 1126    Video End Time (time video stopped): 1158    Originating Location (pt. Location): Home    Distant Location (provider location):  [facility]     Mode of Communication:  Video Conference via [facility]    Alva Whitaker MD  Palliative Medicine  Pager [phone]

## 2020-05-13 ENCOUNTER — VIRTUAL VISIT (OUTPATIENT)
Dept: ONCOLOGY | Facility: CLINIC | Age: 60
End: 2020-05-13
Attending: HOSPITALIST
Payer: COMMERCIAL

## 2020-05-13 VITALS — BODY MASS INDEX: 23.41 KG/M2 | WEIGHT: 128 LBS

## 2020-05-13 DIAGNOSIS — C50.919 METASTATIC BREAST CANCER: Primary | ICD-10-CM

## 2020-05-13 PROCEDURE — 99214 OFFICE O/P EST MOD 30 MIN: CPT | Mod: GT | Performed by: HOSPITALIST

## 2020-05-13 NOTE — PROGRESS NOTES
"Shaneka Alvarez is a 59 year old female who is being evaluated via a billable video visit.      The patient has been notified of following:     \"This video visit will be conducted via a call between you and your physician/provider. We have found that certain health care needs can be provided without the need for an in-person physical exam.  This service lets us provide the care you need with a video conversation.  If a prescription is necessary we can send it directly to your pharmacy.  If lab work is needed we can place an order for that and you can then stop by our lab to have the test done at a later time.    Video visits are billed at different rates depending on your insurance coverage.  Please reach out to your insurance provider with any questions.    If during the course of the call the physician/provider feels a video visit is not appropriate, you will not be charged for this service.\"    Patient has given verbal consent for Video visit? Yes    How would you like to obtain your AVS? Susan    Patient would like the video invitation sent by: Text to cell phone: [phone]    Will anyone else be joining your video visit? No          "

## 2020-05-19 ENCOUNTER — INFUSION THERAPY VISIT (OUTPATIENT)
Dept: INFUSION THERAPY | Facility: CLINIC | Age: 60
End: 2020-05-19
Payer: COMMERCIAL

## 2020-05-19 ENCOUNTER — VIRTUAL VISIT (OUTPATIENT)
Dept: ONCOLOGY | Facility: CLINIC | Age: 60
End: 2020-05-19
Attending: INTERNAL MEDICINE
Payer: COMMERCIAL

## 2020-05-19 ENCOUNTER — ONCOLOGY VISIT (OUTPATIENT)
Dept: ONCOLOGY | Facility: CLINIC | Age: 60
End: 2020-05-19
Payer: COMMERCIAL

## 2020-05-19 VITALS
OXYGEN SATURATION: 99 % | DIASTOLIC BLOOD PRESSURE: 95 MMHG | BODY MASS INDEX: 23.91 KG/M2 | WEIGHT: 130.7 LBS | SYSTOLIC BLOOD PRESSURE: 126 MMHG | TEMPERATURE: 98.2 F | HEART RATE: 78 BPM

## 2020-05-19 VITALS
TEMPERATURE: 98.2 F | DIASTOLIC BLOOD PRESSURE: 86 MMHG | RESPIRATION RATE: 16 BRPM | SYSTOLIC BLOOD PRESSURE: 128 MMHG | OXYGEN SATURATION: 99 % | HEART RATE: 67 BPM

## 2020-05-19 DIAGNOSIS — C50.911 BILATERAL MALIGNANT NEOPLASM OF BREAST IN FEMALE, UNSPECIFIED ESTROGEN RECEPTOR STATUS, UNSPECIFIED SITE OF BREAST (H): ICD-10-CM

## 2020-05-19 DIAGNOSIS — C50.919 METASTATIC BREAST CANCER: Primary | ICD-10-CM

## 2020-05-19 DIAGNOSIS — C50.912 BILATERAL MALIGNANT NEOPLASM OF BREAST IN FEMALE, UNSPECIFIED ESTROGEN RECEPTOR STATUS, UNSPECIFIED SITE OF BREAST (H): ICD-10-CM

## 2020-05-19 LAB
ALBUMIN SERPL-MCNC: 3.7 G/DL (ref 3.4–5)
ALP SERPL-CCNC: 88 U/L (ref 40–150)
ALT SERPL W P-5'-P-CCNC: 23 U/L (ref 0–50)
ANION GAP SERPL CALCULATED.3IONS-SCNC: 8 MMOL/L (ref 3–14)
AST SERPL W P-5'-P-CCNC: 24 U/L (ref 0–45)
BASOPHILS # BLD AUTO: 0 10E9/L (ref 0–0.2)
BASOPHILS NFR BLD AUTO: 0.4 %
BILIRUB SERPL-MCNC: 0.6 MG/DL (ref 0.2–1.3)
BUN SERPL-MCNC: 12 MG/DL (ref 7–30)
CALCIUM SERPL-MCNC: 9.1 MG/DL (ref 8.5–10.1)
CANCER AG27-29 SERPL-ACNC: 286 U/ML (ref 0–39)
CHLORIDE SERPL-SCNC: 100 MMOL/L (ref 94–109)
CO2 SERPL-SCNC: 26 MMOL/L (ref 20–32)
CREAT SERPL-MCNC: 0.62 MG/DL (ref 0.52–1.04)
DIFFERENTIAL METHOD BLD: ABNORMAL
EOSINOPHIL # BLD AUTO: 0 10E9/L (ref 0–0.7)
EOSINOPHIL NFR BLD AUTO: 0 %
ERYTHROCYTE [DISTWIDTH] IN BLOOD BY AUTOMATED COUNT: 14.6 % (ref 10–15)
GFR SERPL CREATININE-BSD FRML MDRD: >90 ML/MIN/{1.73_M2}
GLUCOSE SERPL-MCNC: 92 MG/DL (ref 70–99)
HCT VFR BLD AUTO: 41 % (ref 35–47)
HGB BLD-MCNC: 13.6 G/DL (ref 11.7–15.7)
IMM GRANULOCYTES # BLD: 0 10E9/L (ref 0–0.4)
IMM GRANULOCYTES NFR BLD: 0.6 %
LYMPHOCYTES # BLD AUTO: 0.5 10E9/L (ref 0.8–5.3)
LYMPHOCYTES NFR BLD AUTO: 7.3 %
MAGNESIUM SERPL-MCNC: 2 MG/DL (ref 1.6–2.3)
MCH RBC QN AUTO: 28.2 PG (ref 26.5–33)
MCHC RBC AUTO-ENTMCNC: 33.2 G/DL (ref 31.5–36.5)
MCV RBC AUTO: 85 FL (ref 78–100)
MONOCYTES # BLD AUTO: 1.1 10E9/L (ref 0–1.3)
MONOCYTES NFR BLD AUTO: 15.9 %
NEUTROPHILS # BLD AUTO: 5.2 10E9/L (ref 1.6–8.3)
NEUTROPHILS NFR BLD AUTO: 75.8 %
PLATELET # BLD AUTO: 237 10E9/L (ref 150–450)
POTASSIUM SERPL-SCNC: 3.7 MMOL/L (ref 3.4–5.3)
PROT SERPL-MCNC: 7.2 G/DL (ref 6.8–8.8)
RBC # BLD AUTO: 4.82 10E12/L (ref 3.8–5.2)
SODIUM SERPL-SCNC: 134 MMOL/L (ref 133–144)
WBC # BLD AUTO: 6.9 10E9/L (ref 4–11)

## 2020-05-19 PROCEDURE — 80053 COMPREHEN METABOLIC PANEL: CPT | Performed by: INTERNAL MEDICINE

## 2020-05-19 PROCEDURE — 99213 OFFICE O/P EST LOW 20 MIN: CPT | Mod: GT | Performed by: INTERNAL MEDICINE

## 2020-05-19 PROCEDURE — 85025 COMPLETE CBC W/AUTO DIFF WBC: CPT | Performed by: INTERNAL MEDICINE

## 2020-05-19 PROCEDURE — 83735 ASSAY OF MAGNESIUM: CPT | Performed by: INTERNAL MEDICINE

## 2020-05-19 PROCEDURE — 99207 ZZC NO CHARGE LOS: CPT

## 2020-05-19 PROCEDURE — 99207 ZZC NO CHARGE NURSE ONLY: CPT

## 2020-05-19 PROCEDURE — 96409 CHEMO IV PUSH SNGL DRUG: CPT | Performed by: NURSE PRACTITIONER

## 2020-05-19 PROCEDURE — 86300 IMMUNOASSAY TUMOR CA 15-3: CPT | Performed by: INTERNAL MEDICINE

## 2020-05-19 PROCEDURE — 96367 TX/PROPH/DG ADDL SEQ IV INF: CPT | Performed by: NURSE PRACTITIONER

## 2020-05-19 RX ORDER — MEPERIDINE HYDROCHLORIDE 25 MG/ML
25 INJECTION INTRAMUSCULAR; INTRAVENOUS; SUBCUTANEOUS EVERY 30 MIN PRN
Status: CANCELLED | OUTPATIENT
Start: 2020-05-26

## 2020-05-19 RX ORDER — ALBUTEROL SULFATE 0.83 MG/ML
2.5 SOLUTION RESPIRATORY (INHALATION)
Status: CANCELLED | OUTPATIENT
Start: 2020-05-19

## 2020-05-19 RX ORDER — HEPARIN SODIUM,PORCINE 10 UNIT/ML
5 VIAL (ML) INTRAVENOUS
Status: CANCELLED | OUTPATIENT
Start: 2020-05-26

## 2020-05-19 RX ORDER — SODIUM CHLORIDE 9 MG/ML
1000 INJECTION, SOLUTION INTRAVENOUS CONTINUOUS PRN
Status: CANCELLED
Start: 2020-05-19

## 2020-05-19 RX ORDER — EPINEPHRINE 0.3 MG/.3ML
0.3 INJECTION SUBCUTANEOUS EVERY 5 MIN PRN
Status: CANCELLED | OUTPATIENT
Start: 2020-05-26

## 2020-05-19 RX ORDER — EPINEPHRINE 1 MG/ML
0.3 INJECTION, SOLUTION INTRAMUSCULAR; SUBCUTANEOUS EVERY 5 MIN PRN
Status: CANCELLED | OUTPATIENT
Start: 2020-05-26

## 2020-05-19 RX ORDER — ALBUTEROL SULFATE 90 UG/1
1-2 AEROSOL, METERED RESPIRATORY (INHALATION)
Status: CANCELLED
Start: 2020-05-26

## 2020-05-19 RX ORDER — MEPERIDINE HYDROCHLORIDE 25 MG/ML
25 INJECTION INTRAMUSCULAR; INTRAVENOUS; SUBCUTANEOUS EVERY 30 MIN PRN
Status: CANCELLED | OUTPATIENT
Start: 2020-05-19

## 2020-05-19 RX ORDER — ALBUTEROL SULFATE 0.83 MG/ML
2.5 SOLUTION RESPIRATORY (INHALATION)
Status: CANCELLED | OUTPATIENT
Start: 2020-05-26

## 2020-05-19 RX ORDER — LORAZEPAM 2 MG/ML
0.5 INJECTION INTRAMUSCULAR EVERY 4 HOURS PRN
Status: CANCELLED
Start: 2020-05-19

## 2020-05-19 RX ORDER — DIPHENHYDRAMINE HYDROCHLORIDE 50 MG/ML
50 INJECTION INTRAMUSCULAR; INTRAVENOUS
Status: CANCELLED
Start: 2020-05-26

## 2020-05-19 RX ORDER — EPINEPHRINE 1 MG/ML
0.3 INJECTION, SOLUTION INTRAMUSCULAR; SUBCUTANEOUS EVERY 5 MIN PRN
Status: CANCELLED | OUTPATIENT
Start: 2020-05-19

## 2020-05-19 RX ORDER — HEPARIN SODIUM (PORCINE) LOCK FLUSH IV SOLN 100 UNIT/ML 100 UNIT/ML
5 SOLUTION INTRAVENOUS
Status: DISCONTINUED | OUTPATIENT
Start: 2020-05-19 | End: 2020-05-19 | Stop reason: HOSPADM

## 2020-05-19 RX ORDER — HEPARIN SODIUM,PORCINE 10 UNIT/ML
5 VIAL (ML) INTRAVENOUS
Status: CANCELLED | OUTPATIENT
Start: 2020-05-19

## 2020-05-19 RX ORDER — METHYLPREDNISOLONE SODIUM SUCCINATE 125 MG/2ML
125 INJECTION, POWDER, LYOPHILIZED, FOR SOLUTION INTRAMUSCULAR; INTRAVENOUS
Status: CANCELLED
Start: 2020-05-19

## 2020-05-19 RX ORDER — HEPARIN SODIUM (PORCINE) LOCK FLUSH IV SOLN 100 UNIT/ML 100 UNIT/ML
5 SOLUTION INTRAVENOUS
Status: CANCELLED | OUTPATIENT
Start: 2020-05-19

## 2020-05-19 RX ORDER — EPINEPHRINE 0.3 MG/.3ML
0.3 INJECTION SUBCUTANEOUS EVERY 5 MIN PRN
Status: CANCELLED | OUTPATIENT
Start: 2020-05-19

## 2020-05-19 RX ORDER — SODIUM CHLORIDE 9 MG/ML
1000 INJECTION, SOLUTION INTRAVENOUS CONTINUOUS PRN
Status: CANCELLED
Start: 2020-05-26

## 2020-05-19 RX ORDER — DIPHENHYDRAMINE HYDROCHLORIDE 50 MG/ML
50 INJECTION INTRAMUSCULAR; INTRAVENOUS
Status: CANCELLED
Start: 2020-05-19

## 2020-05-19 RX ORDER — HEPARIN SODIUM (PORCINE) LOCK FLUSH IV SOLN 100 UNIT/ML 100 UNIT/ML
5 SOLUTION INTRAVENOUS
Status: CANCELLED | OUTPATIENT
Start: 2020-05-26

## 2020-05-19 RX ORDER — LORAZEPAM 2 MG/ML
0.5 INJECTION INTRAMUSCULAR EVERY 4 HOURS PRN
Status: CANCELLED
Start: 2020-05-26

## 2020-05-19 RX ORDER — NALOXONE HYDROCHLORIDE 0.4 MG/ML
.1-.4 INJECTION, SOLUTION INTRAMUSCULAR; INTRAVENOUS; SUBCUTANEOUS
Status: CANCELLED | OUTPATIENT
Start: 2020-05-26

## 2020-05-19 RX ORDER — NALOXONE HYDROCHLORIDE 0.4 MG/ML
.1-.4 INJECTION, SOLUTION INTRAMUSCULAR; INTRAVENOUS; SUBCUTANEOUS
Status: CANCELLED | OUTPATIENT
Start: 2020-05-19

## 2020-05-19 RX ORDER — METHYLPREDNISOLONE SODIUM SUCCINATE 125 MG/2ML
125 INJECTION, POWDER, LYOPHILIZED, FOR SOLUTION INTRAMUSCULAR; INTRAVENOUS
Status: CANCELLED
Start: 2020-05-26

## 2020-05-19 RX ORDER — ALBUTEROL SULFATE 90 UG/1
1-2 AEROSOL, METERED RESPIRATORY (INHALATION)
Status: CANCELLED
Start: 2020-05-19

## 2020-05-19 RX ADMIN — HEPARIN SODIUM (PORCINE) LOCK FLUSH IV SOLN 100 UNIT/ML 5 ML: 100 SOLUTION at 08:05

## 2020-05-19 RX ADMIN — HEPARIN SODIUM (PORCINE) LOCK FLUSH IV SOLN 100 UNIT/ML 5 ML: 100 SOLUTION at 10:26

## 2020-05-19 RX ADMIN — Medication 250 ML: at 09:21

## 2020-05-19 ASSESSMENT — PAIN SCALES - GENERAL
PAINLEVEL: MODERATE PAIN (5)
PAINLEVEL: MODERATE PAIN (5)

## 2020-05-19 NOTE — PATIENT INSTRUCTIONS
Pt to return on 05/26/20 for C6 D8 Eribulin. Copies of medication list and upcoming appointments given prior to discharge.

## 2020-05-19 NOTE — PROGRESS NOTES
"SPIRITUAL HEALTH SERVICES Progress Note  [facility]    Visited Shaneka RICKY Alvarez   in the infusion center at request of Palliative care team for ongoing emotional/spiritual support.  Offered reflective conversation, support and affirmation as she shared her journey and concerns.       Illness Narrative - Patient states that she is doing well physically but is having some challenges emotionally.       Distress - Patient shared that she see others with cancer going about their life and doing well and she compares herself to them but does not feel that way at times.  \"Some times I have tears and I don't know why.\"  Patient also shared that her  does not like to see her cry.       Coping - Patient is reaching out for support, I affirmed that her emotions are normal and that this journey is her own.  I let her know about the availability of support from social workers and our psychologist here at [place] and suggested that she make an appointment with one of them.       Meaning-Making - Not discussed today.       Plan - Patient  knows that she can request a Spiritual Health encounter as needed and she is also OK with me checking in with her on regular basis during her treatments here.     Gina Pineda  Staff   [phone]    "

## 2020-05-19 NOTE — PROGRESS NOTES
Infusion Nursing Note:  Shaneka Alvarez presents today for C6 D1 Eribulin.    Patient seen by provider today: Yes: Dr. Zepeda   present during visit today: Not Applicable.    Note: Patient has no new complaints today.    Intravenous Access:  Implanted Port.    Treatment Conditions:  Lab Results   Component Value Date    HGB 13.6 05/19/2020     Lab Results   Component Value Date    WBC 6.9 05/19/2020      Lab Results   Component Value Date    ANEU 5.2 05/19/2020     Lab Results   Component Value Date     05/19/2020      Lab Results   Component Value Date     05/19/2020                   Lab Results   Component Value Date    POTASSIUM 3.7 05/19/2020           Lab Results   Component Value Date    MAG 2.0 05/19/2020            Lab Results   Component Value Date    CR 0.62 05/19/2020                   Lab Results   Component Value Date    RAFAELA 9.1 05/19/2020                Lab Results   Component Value Date    BILITOTAL 0.6 05/19/2020           Lab Results   Component Value Date    ALBUMIN 3.7 05/19/2020                    Lab Results   Component Value Date    ALT 23 05/19/2020           Lab Results   Component Value Date    AST 24 05/19/2020       Results reviewed, labs MET treatment parameters, ok to proceed with treatment.      Post Infusion Assessment:  Patient tolerated infusion without incident.  Patient observed for 10 minutes post infusion per protocol.  Blood return noted pre and post infusion.  Blood return noted during administration every 3-4 cc.  Site patent and intact, free from redness, edema or discomfort.  No evidence of extravasations.  Access discontinued per protocol.       Discharge Plan:   Discharge instructions reviewed with: Patient.  Patient and/or family verbalized understanding of discharge instructions and all questions answered.  Patient discharged in stable condition accompanied by: self.  Departure Mode: Ambulatory.    Jennifer Calderon RN

## 2020-05-19 NOTE — NURSING NOTE
"Shaneka Alvarez is a 59 year old female who is being evaluated via a billable video visit.      The patient has been notified of following:     \"This video visit will be conducted via a call between you and your physician/provider. We have found that certain health care needs can be provided without the need for an in-person physical exam.  This service lets us provide the care you need with a video conversation.  If a prescription is necessary we can send it directly to your pharmacy.  If lab work is needed we can place an order for that and you can then stop by our lab to have the test done at a later time.    Video visits are billed at different rates depending on your insurance coverage.  Please reach out to your insurance provider with any questions.    If during the course of the call the physician/provider feels a video visit is not appropriate, you will not be charged for this service.\"    Patient has given verbal consent for Video visit? Yes    How would you like to obtain your AVS? Susan    Patient would like the video invitation sent by: Text to cell phone: [phone]    Will anyone else be joining your video visit? No        Video-Visit Details    Type of service:  Video Visit    Originating Location (pt. Location): Other in clinic prior to tx    Distant Location (provider location):  [facility]     Platform used for Video Visit: [employer]     Patient has a headache today.  She states that she rates it at a 5.  She had an injection in her right eye yesterday.  She is having trouble with her vision.  She has had some nausea and vomiting in the last 24 hours.  She has lost 8 pounds since 5/5/20.  She states that she has not had much of an appetite in the last couple of weeks.  No trouble chewing or swallowing.  She does have anxiety and depression but takes Effexor.  She has some trouble sleeping but takes lorazepam as needed.    NO concerns    Eliza Thakur CMA        "

## 2020-05-19 NOTE — PROGRESS NOTES
ONCOLOGY FOLLOW UP NOTE: 5/19/2020        I took the history from reviewing the previous notes that she was following with Dr. Amaya.  I have copied and updated from prior notes.    ONCOLOGY History:    1.  January 2006:  Diagnosed with Stage IIB, T2 N1 M0 invasive lobular carcinoma of the right breast.  Final pathology showed a 4.5 x 3.8 x 2.5 cm, 01/14 lymph nodes positive.  Estrogen, progesterone receptor positive, HER-2 negative.     2.  Genetics.  BRCA1 and 2 mutations not detected. 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       THERAPY TO DATE:   1.  2006:  ECOG 2104 protocol of dose-dense Adriamycin, Cytoxan and Avastin x4 cycles followed by Taxol and Avastin x4 cycles.   2.  03/2007:  Completed 1 year Avastin.   3.  11/2006-01/2007:  Radiotherapy to the right breast of 5040 cGy.   4.  03/[id number]:  Aromasin.  Stopped after moving to the [facility].   5.  01/2016:  Right modified radical mastectomy with latissimus dorsi flap reconstruction and a left prophylactic mastectomy with latissimus dorsi flap reconstruction.     She recently had MRI of the brain as a follow-up for multiple sclerosis and there were multiple calvarial lesions noted which was suspicious for metastatic disease.  There was no evidence of new multiple sclerosis lesions.  She had a PET scan on 8/30/2019 which showed widespread bone metastatic lesions (calvarium, spine, sacrum, pelvis, ribs most prominent at C7, T3, L1 and L4), hypermetabolic 3 cm lesion in LLL, and mediastinal/hilar LN. CEA wnl at 1.9 and C27-29 elevated to 415 (28 on 8/23/16). A CT guided biopsy of the right iliac bone was obtained on 9/4/19, pathology consistent with metastatic adenocarcinoma (breast), ER/IA negative, HER-2 negative PDL 1 < 1%. A second biopsy was take of the LLL nodule via EBUS on 9/5/19 did not show any malignancy.    C/T/L spine MRI 8/3/19 to 9/2/19 with numerous enhancing lesions of the C, T and L spine, largest around T9 and L1. No  evidence of cord compression. Has a L1 compression fracture and impending L4.     Received radiation T12-L5 3000 cGy in 10 fractions from 9/6/2019 till 9/18/2019.  Neurosurgery recommended no surgical intervention but to wear [place] brace when out of bed and HOB >30 degrees    She started palliative Xeloda 2000/1500 on 10/1/2019 but after just a few doses she noticed nausea, red eyes blurred vision, loss of appetite.  She stopped taking it after 5 doses and was seen by nurse practitioner on 10/7/2019 when she was improving.  At that point she was restarted on Xeloda at a lower dose of 1000 mg twice a day.  As she was not able to tolerate even the lower dose with extreme nausea and vomiting and feeling extremely fatigued and blurry vision, we decided on stopping Xeloda.    We decided on repeating scans and MRI brain on 10/25/2019 showed no intracranial metastatic disease.   Multiple enhancing calvarial lesions may be increased in number and are suggestive of metastatic disease. Thinner imaging on the current study may identify the smaller lesions and may be responsible for  identified more lesions.   There is a single focus of T2 hyperintense signal within the anterior right temporal lobe may represent interval demyelination. There is no evidence for active demyelination.    PET/CT on 11/1/2019 showed favorable response to therapy and Overall FDG uptake within scattered osseous metastases is decreased since 8/30/2019, particularly within the pelvis. Increased sclerotic appearance of L1 and L4 pathologic compression deformities, likely sequela of recently completed palliative radiation therapy.    No significant FDG uptake in previously demonstrated hypermetabolic mediastinal lymph nodes. Biopsy negative on 9/5/2019.  There is also decreased FDG uptake in the left lower lobe (biopsy negative for  malignancy), now with dense consolidation containing air bronchograms suggestive of infection versus aspiration.  There is  diffuse FDG uptake within the esophagus, consistent with esophagitis.    Because of intolerance to Xeloda we decided on switching to weekly paclitaxel on 11/5/2019.    C#2 Taxol 12/4/19- started with 70mg/m2 dose reduction    C#3 Taxol 12/30/2019     PET/CT on 1/24/2020 showed progression of the disease in some of the bone lesions including increase in size and lytic lesion within the vertebral body of C4 measuring 1 cm as opposed to 0.6 cm previously and a new central soft tissue nodule with SUV max 4.1 when previously it was non-hypermetabolic.  There is also some progression noted in the right proximal femur and right iliac bone.  There is decreased metabolic uptake in the right lamina of T9 with SUV max 4.7 when previously it was 8.0.  Other lesions are stable.    CA 27-29 also had increased significantly and it was 257 on 1/28/2020.    We decided on switching to eribulin on 1/28/2020.    C#2 2/18/2020  C#2 D#8 2/25/2020    C#3 D#1 3/18/2020 -delayed by 1 week as per patient's preference because she wanted to visit her family.    She had a repeat PET/CT on 3/27/2020 which showed stable size of the bone metastatic lesions although the FDG avidity was less, consistent with treatment response.    She had MRI of the thoracic spine on 4/3/2020 and it was compared to the one which was done in August 2019 and it showed increased size of several metastatic lesions most notably at T9 but also at T5-T7.  Other lesions at T10, T11 and T12 appeared improved.    CA 27-29 was also down to 222 on 3/18/2020.    Cycle #4 eribulin ( dose reduced to 1.2 mg/m2 )-4/7/2020-   Cycle #5 Eribulin ( 1.2 mg/m2 )- 4/28/2020   Cycle #6 Eribulin ( 1.2 mg/m2 )- 5/19/2020     I had also recommended starting Zometa so she got dental clearance to start with that.  She received Zometa on 10/23/2019 and 1/14/2020 and 4/7/2020 on a 3 monthly schedule.      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  It was  thought that this could be due to Taxol as patient reported to the ophthalmologist that she was on Taxol in September 2019 when her symptoms started.  But she was not on Taxol in September 2019 when she started noticing the visual changes so Taxol is not responsible for this.  The first dose of Taxol was on 11/5/2019.       Interval history.  This is a video visit.  [employer] did not work so we did video visit using [employer].  Overall she is doing well.  She thinks that she tolerated the chemotherapy well with mild nausea but she did not take any antinausea medication.  Bowel movements have been fine.  She thinks neuropathy is the same and it has not worsened.  She did not have great appetite and initially she lost about 10 pounds but she has gained back a couple of pounds.  She has well-controlled pain in her back with occasional morphine use.  She had injection of steroid to the right eye yesterday and noticed some headaches after that but now the headache is very mild.  No other neurological problems apart from baseline issues with balance and heat and cold sensitivity and weakness in the legs from underlying multiple sclerosis.  No new swellings.  Denies infections.  No shortness of breath.  Overall energy is a little better.      ECOG 1    ROS:  Rest of the comprehensive review of the system was essentially unremarkable.        I reviewed other history in epic as below.       PAST MEDICAL HISTORY:     1.  Breast cancer  2.  Multiple sclerosis.   3.  Depression.   4.  Migraines.   5.  Hypertension.   6.  Cholecystectomy and umbilical hernia repair.     SOCIAL HISTORY: She smoked for 5 years but quit many years ago.  Drinks alcohol socially.  She lives with her .  She is a teacher in middle school.       FAMILY HISTORY: She mentions that her mother had breast cancer at 49.  Both grandmothers had breast cancer but she is not sure of the age.  A couple of cousins have cancers.  Patient has 3 children who are  "healthy.    Current Outpatient Medications   Medication     acetaminophen (TYLENOL) 500 MG tablet     busPIRone (BUSPAR) 15 MG tablet     calcium citrate-vitamin D (CITRACAL) 315-250 MG-UNIT TABS     Cholecalciferol (VITAMIN D3 PO)     cyclopentolate (CYCLOGYL) 1 % ophthalmic solution     diclofenac (VOLTAREN) 1 % topical gel     ibuprofen (ADVIL/MOTRIN) 600 MG tablet     LORazepam (ATIVAN) 0.5 MG tablet     magnesium 250 MG tablet     morphine (MS CONTIN) 15 MG CR tablet     morphine (MSIR) 15 MG IR tablet     naloxone (NARCAN) 1 mg/mL for intranasal kit (2 syringes with 2 mucosal atomizer device)     polyethylene glycol (MIRALAX/GLYCOLAX) packet     prochlorperazine (COMPAZINE) 10 MG tablet     propranolol (INDERAL LA) 60 MG 24 hr capsule     senna-docusate (SENOKOT-S/PERICOLACE) 8.6-50 MG tablet     syringe/needle, disp, (B-D INTEGRA SYRINGE) 23G X 1\" 3 ML MISC     traZODone (DESYREL) 50 MG tablet     venlafaxine (EFFEXOR-ER) 225 MG TB24 24 hr tablet     No current facility-administered medications for this visit.      Facility-Administered Medications Ordered in Other Visits   Medication     heparin 100 UNIT/ML injection 5 mL     sodium chloride (PF) 0.9% PF flush 10-20 mL        Allergies   Allergen Reactions     Bactrim [Sulfamethoxazole W/Trimethoprim]      Internal bleeding     Copaxone [Glatiramer] Hives          PHYSICAL EXAMINATION:     BP (!) 126/95   Pulse 78   Temp 98.2  F (36.8  C)   Wt 59.3 kg (130 lb 11.2 oz)   SpO2 99%   BMI 23.91 kg/m    Wt Readings from Last 4 Encounters:   05/19/20 59.3 kg (130 lb 11.2 oz)   05/13/20 58.1 kg (128 lb)   05/05/20 62.6 kg (138 lb)   04/28/20 63 kg (138 lb 12.8 oz)         Constitutional.  Looks well and in no apparent distress.   Eyes.  Without eye redness or apparent jaundice.   Respiratory.  Non labored breathing. Speaking in full sentences.   Skin.  No concerning skin rashes on the skin visualized.   Neurological.  Is alert and oriented.  Psychiatric.  Mood " and affect seem appropriate.      The rest of a comprehensive physical examination is deferred due to Public Health Emergency video visit restrictions.        Labs and Imaging.    Reviewed    Results for KYLE GU (MRN [id number]) as of 5/19/2020 08:45   Ref. Range 5/19/2020 08:11   Sodium Latest Ref Range: 133 - 144 mmol/L 134   Potassium Latest Ref Range: 3.4 - 5.3 mmol/L 3.7   Chloride Latest Ref Range: 94 - 109 mmol/L 100   Carbon Dioxide Latest Ref Range: 20 - 32 mmol/L 26   Urea Nitrogen Latest Ref Range: 7 - 30 mg/dL 12   Creatinine Latest Ref Range: 0.52 - 1.04 mg/dL 0.62   GFR Estimate Latest Ref Range: >60 mL/min/1.73_m2 >90   GFR Estimate If Black Latest Ref Range: >60 mL/min/1.73_m2 >90   Calcium Latest Ref Range: 8.5 - 10.1 mg/dL 9.1   Anion Gap Latest Ref Range: 3 - 14 mmol/L 8   Magnesium Latest Ref Range: 1.6 - 2.3 mg/dL 2.0   Albumin Latest Ref Range: 3.4 - 5.0 g/dL 3.7   Protein Total Latest Ref Range: 6.8 - 8.8 g/dL 7.2   Bilirubin Total Latest Ref Range: 0.2 - 1.3 mg/dL 0.6   Alkaline Phosphatase Latest Ref Range: 40 - 150 U/L 88   ALT Latest Ref Range: 0 - 50 U/L 23   AST Latest Ref Range: 0 - 45 U/L 24   Glucose Latest Ref Range: 70 - 99 mg/dL 92   WBC Latest Ref Range: 4.0 - 11.0 10e9/L 6.9   Hemoglobin Latest Ref Range: 11.7 - 15.7 g/dL 13.6   Hematocrit Latest Ref Range: 35.0 - 47.0 % 41.0   Platelet Count Latest Ref Range: 150 - 450 10e9/L 237   RBC Count Latest Ref Range: 3.8 - 5.2 10e12/L 4.82   MCV Latest Ref Range: 78 - 100 fl 85   MCH Latest Ref Range: 26.5 - 33.0 pg 28.2   MCHC Latest Ref Range: 31.5 - 36.5 g/dL 33.2   RDW Latest Ref Range: 10.0 - 15.0 % 14.6   Diff Method Unknown Automated Method   % Neutrophils Latest Units: % 75.8   % Lymphocytes Latest Units: % 7.3   % Monocytes Latest Units: % 15.9   % Eosinophils Latest Units: % 0.0   % Basophils Latest Units: % 0.4   % Immature Granulocytes Latest Units: % 0.6   Absolute Neutrophil Latest Ref Range: 1.6 - 8.3 10e9/L 5.2    Absolute Lymphocytes Latest Ref Range: 0.8 - 5.3 10e9/L 0.5 (L)   Absolute Monocytes Latest Ref Range: 0.0 - 1.3 10e9/L 1.1   Absolute Eosinophils Latest Ref Range: 0.0 - 0.7 10e9/L 0.0   Absolute Basophils Latest Ref Range: 0.0 - 0.2 10e9/L 0.0   Abs Immature Granulocytes Latest Ref Range: 0 - 0.4 10e9/L 0.0       Results for KYLE GU (MRN [id number]) as of 5/19/2020 08:27   Ref. Range 11/5/2019 14:00 12/4/2019 09:20 12/30/2019 10:15 1/28/2020 08:15 2/18/2020 08:50 3/18/2020 07:45 4/7/2020 10:55 4/28/2020 12:12   CA 27-29 Latest Ref Range: 0 - 39 U/mL 149 (H) 153 (H) 187 (H) 257 (H) 281 (H) 222 (H) 222 (H) 239 (H)       MR CERVICAL SPINE W/O & W CONTRAST 1/31/2020 11:04 AM     Provided History: left arm tingling- hx of breast cancer with spine  mets; Carcinoma of breast metastatic to bone, unspecified laterality  (H); Carcinoma of breast metastatic to bone, unspecified laterality  (H); Numbness and tingling in left arm; Numbness and tingling in left  arm  ICD-10: Carcinoma of breast metastatic to bone, unspecified laterality  (H); Carcinoma of breast metastatic to bone, unspecified laterality  (H); Numbness and tingling in left arm; Numbness and tingling in left  arm     Comparison: 19 2019     Technique: Sagittal T1-weighted, sagittal T2-weighted, sagittal  diffusion weighted, axial T2-weighted, and axial T2* gradient echo  images of the cervical spine were obtained without intravenous  contrast. Following intravenous administration of gadolinium, axial  and sagittal T1-weighted images with fat saturation were also  obtained.     Contrast: 7 ml Gadavist     Findings:  The cervical vertebrae are normally aligned. Mild straightening of the  normal cervical lordosis. There is no disc height narrowing at any  level.  There is normal signal within and normal contour of the  cervical spinal cord.     There are multiple enhancing foci within the cervical vertebral bodies  and posterior elements which have  increased in size since the prior  study. For example the lesion in C7 is mildly increased measuring 11 x  10 mm where this previously measured 10 x 9 mm. The lesion in C4 now  measures 9 x 9 mm were this previously measured 5 x 5 mm. There are  lesions in the vertebral bodies of C2, C4, C6, C7, T1, T2, T3 and the  posterior elements of C3, C5 and C6. There is no epidural or neural  foraminal involvement. The degenerative changes in the cervical spine  are stable.                                                                      Impression:   1. Mild progression of the lesions in the cervical and upper thoracic  spine compared to the prior. No epidural or neural foraminal  involvement is seen..  2. Mild cervical spondylosis, unchanged. No significant spinal canal  stenosis or neural foraminal narrowing.      ASSESSMENT AND PLAN:   1.  History of stage IIB, T2 N1 M0 invasive lobular carcinoma of the right breast.  It was initially diagnosed in 2006 and at that time it was hormone receptor positive, HER-2 negative.  She was treated on ECOG 2104 protocol with dose-dense Adriamycin, Cytoxan and Avastin x4 cycles followed by Taxol and Avastin x4 cycles followed by a Avastin for 1 year.  She also received radiation to the right breast which she completed in January 2007 (5040 cGy).  She took Aromasin from 2007 to 2014.    Now she has metastatic breast cancer with extensive involvement of the bones.  There is also a left lower lobe hypermetabolic lesion and mediastinal lymph nodes which are hypermetabolic.  The biopsy from the right iliac bone is consistent with metastatic lobular breast cancer but it is hormone receptor and HER-2 negative and PDL 1 is also negative.    She has received 3000 cGy of palliative radiation to T12-L5 from 9/6/2019 till 9/18/2019.    She was started on palliative Xeloda but she was unable to tolerate even the dose reduced medication.        We decided on repeating scans and MRI brain on  10/25/2019 showed no intracranial metastatic disease.   Multiple enhancing calvarial lesions may be increased in number and are suggestive of metastatic disease. Thinner imaging on the current study may identify the smaller lesions and may be responsible for identified more lesions.   There is a single focus of T2 hyperintense signal within the anterior right temporal lobe may represent interval demyelination. There is no evidence for active demyelination.    PET/CT on 11/1/2019 showed favorable response to therapy and Overall FDG uptake within scattered osseous metastases is decreased since 8/30/2019, particularly within the pelvis. Increased sclerotic appearance of L1 and L4 pathologic compression deformities, likely sequela of recently completed palliative radiation therapy.    No significant FDG uptake in previously demonstrated hypermetabolic mediastinal lymph nodes. Biopsy negative on 9/5/2019.  There is also decreased FDG uptake in the left lower lobe (biopsy negative for malignancy), now with dense consolidation containing air bronchograms suggestive of infection versus aspiration.  There is diffuse FDG uptake within the esophagus, consistent with esophagitis.    Because of intolerance to Xeloda we decided on switching to weekly paclitaxel on 11/5/2019.    For better tolerance we decreased the dose of paclitaxel to 70 mg/m  with cycle #2.  She has completed 3 cycles of Taxol and the last chemotherapy was on 1/14/2020.      PET/CT on 1/24/2020 showed progression of the disease in some of the bone lesions including increase in size and lytic lesion within the vertebral body of C4 measuring 1 cm as opposed to 0.6 cm previously and a new central soft tissue nodule with SUV max 4.1 when previously it was non-hypermetabolic.  There is also some progression noted in the right proximal femur and right iliac bone.  There is decreased metabolic uptake in the right lamina of T9 with SUV max 4.7 when previously it was  8.0.  Other lesions are stable.    There are no pathologically enlarged mediastinal, hilar or axillary lymph nodes. Calcified subcarinal lymph nodes. There are no suspicious lung nodules or evidence for infection and previous left lower lobe consolidation has resolved.     CA 27-29 also had increased significantly and it was 257 on 1/28/2020.    Due to progression we switched to eribulin on 1/28/2020.        After 3 cycles, she had a repeat PET/CT on 3/27/2020 which showed a stable size of the bone metastatic lesions although the FDG avidity was less, consistent with treatment response.    She had MRI of the thoracic spine on 4/3/2020 and it was compared to the one which was done in August 2019 and it showed increased size of several metastatic lesions most notably at T9 but also at T5-T7.  Other lesions at T10, T11 and T12 appeared improved.    CA 27-29 was also down to 222 on 3/18/2020.    I believe overall this is consistent with a partial response to the treatment.  Overall she is tolerating eribulin well with some worsening of neuropathy and cytopenias.    We discussed the situation in detail.  We decided on continuing the chemotherapy with some modifications and she got cycle #4 with slightly decreased dose of eribulin to 1.2 mg/m2.      She tolerated the 4th and 5th cycle well.    She will start cycle #6 today.  We will plan on repeating his scans after cycle #7 or 8.      Recently FDA approved sacituzumab govitecan-hziy (Trodelvy) for TNBC, based on the results of the phase II IMMU-132-01 clinical trial. So this might become an option for her in the future.     Nausea.  This was mild and she did not take any antinausea medication at this time.  Previously she is used Compazine which helped.  She can take it as needed.      Neuropathy.  Neuropathy was getting worse so we decreased the dose of eribulin and it has stabilized.  She does have underlying multiple sclerosis so has baseline balance issues and heat  and cold sensitivity and weakness in the legs which is a chronic issue.  Continue to observe.      Nutrition.  She had a poor appetite.  She lost about 10 pounds but then gained back a few pounds.  I advised her to eat a small frequent meals throughout the day and also increase protein intake in her diet.  I encouraged her to use Ensure/boost to get additional proteins and calories.    Pain management.  This is from the widely metastatic breast cancer to the bones.  She is using morphine as needed.  She will continue to follow with palliative care.  Overall pain is under decent control.        Previously she got palliative radiation to T12-L5 3000 cGy in 10 fractions from 9/6/2019 till 9/18/2019.  She was evaluated by orthopedics Dr. Bipin Elliott on 11/1/2019 and conservative management was recommended.      Bone metastasis.  She received Zometa on 10/23/2019 and 01/14/2020 and 4/7/2020.  She is getting it every 3 months.  Continue calcium and vitamin D.          We did not address the following today.  Insomnia.  Trazodone is helping and I refilled it today.        Vision changes.    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  It was thought that this could be due to Taxol as patient reported to the ophthalmologist that she was on Taxol in September 2019 when her symptoms started.  But she was not on Taxol in September 2019 when she started noticing the visual changes so Taxol is not responsible for this.  The first dose of Taxol was on 11/5/2019.   That being said we have now switched the treatment to eribulin.  Continue to follow with ophthalmology.  She was told that the vision would take several weeks to improve.  Increased FDG ability in the colon.  She had FDG uptake in the cecum and ascending colon but no corresponding lesion was seen on the CT scan.  She is completely asymptomatic so at this time we will continue to observe.    Fatigue.  This is from the cancer and  chemotherapy.  I again encouraged her to do regular exercise.  Continue to follow with cancer rehab.        Discussion regarding healthcare directives.  On previous visit she told me that she will bring the health care directive for our records but she forgot so I told her to bring it on next visit.      Breast implant removal.  She tells me that she was planning to do breast implant removal because there was a recall on this.  Her surgery was scheduled for 9/24/2019.  Because of this new development of metastatic breast cancer and her recent radiation, surgery has been canceled.  We discussed that at this time treatment for metastatic breast cancer would take precedence over the other surgery as the other surgery would require her to be off chemotherapy for several weeks and in that case the chances of cancer progression would be high and I would not recommend that.    Multiple sclerosis.  She will continue to follow with her neurologist Dr. Quiles.  Currently multiple sclerosis is under control and currently she is not taking any medications.    RTC in 3 weeks to see nurse practitioner.  See me in 6 weeks.    All questions answered.  She is agreeable and comfortable with the plan.        Dewayne Zepeda MD    Video start time. 8:31 AM  Video Stop time.  8:41 AM

## 2020-05-24 ENCOUNTER — NURSE TRIAGE (OUTPATIENT)
Dept: NURSING | Facility: CLINIC | Age: 60
End: 2020-05-24

## 2020-05-24 NOTE — TELEPHONE ENCOUNTER
Caller is spouse; reports patient is getting chemo; today she is quite ill ; currently in bed, has shaking chills. Is weak and skin is cold and clammy. Has rené vomiting.  Triage protocol reviewed   advised to call EMS now for assessment of shock.    Caller understands and will comply      Reason for Disposition    Shock suspected (e.g., cold/pale/clammy skin, too weak to stand, low BP, rapid pulse)    Protocols used: CANCER - FEVER-A-AH

## 2020-05-26 ENCOUNTER — ONCOLOGY VISIT (OUTPATIENT)
Dept: ONCOLOGY | Facility: CLINIC | Age: 60
End: 2020-05-26
Payer: COMMERCIAL

## 2020-05-26 ENCOUNTER — INFUSION THERAPY VISIT (OUTPATIENT)
Dept: INFUSION THERAPY | Facility: CLINIC | Age: 60
End: 2020-05-26
Payer: COMMERCIAL

## 2020-05-26 VITALS
DIASTOLIC BLOOD PRESSURE: 84 MMHG | SYSTOLIC BLOOD PRESSURE: 120 MMHG | TEMPERATURE: 97.5 F | BODY MASS INDEX: 23.78 KG/M2 | WEIGHT: 130 LBS | RESPIRATION RATE: 16 BRPM | HEART RATE: 66 BPM | OXYGEN SATURATION: 100 %

## 2020-05-26 DIAGNOSIS — C50.912 BILATERAL MALIGNANT NEOPLASM OF BREAST IN FEMALE, UNSPECIFIED ESTROGEN RECEPTOR STATUS, UNSPECIFIED SITE OF BREAST (H): ICD-10-CM

## 2020-05-26 DIAGNOSIS — C50.911 BILATERAL MALIGNANT NEOPLASM OF BREAST IN FEMALE, UNSPECIFIED ESTROGEN RECEPTOR STATUS, UNSPECIFIED SITE OF BREAST (H): ICD-10-CM

## 2020-05-26 DIAGNOSIS — E87.5 HYPERKALEMIA: ICD-10-CM

## 2020-05-26 DIAGNOSIS — C50.919 METASTATIC BREAST CANCER: Primary | ICD-10-CM

## 2020-05-26 LAB
ALBUMIN SERPL-MCNC: 3.9 G/DL (ref 3.4–5)
ALP SERPL-CCNC: 71 U/L (ref 40–150)
ALT SERPL W P-5'-P-CCNC: 28 U/L (ref 0–50)
ANION GAP SERPL CALCULATED.3IONS-SCNC: 7 MMOL/L (ref 3–14)
AST SERPL W P-5'-P-CCNC: 28 U/L (ref 0–45)
BASOPHILS # BLD AUTO: 0.1 10E9/L (ref 0–0.2)
BASOPHILS NFR BLD AUTO: 1.3 %
BILIRUB SERPL-MCNC: 0.6 MG/DL (ref 0.2–1.3)
BUN SERPL-MCNC: 13 MG/DL (ref 7–30)
CALCIUM SERPL-MCNC: 9.4 MG/DL (ref 8.5–10.1)
CHLORIDE SERPL-SCNC: 94 MMOL/L (ref 94–109)
CO2 SERPL-SCNC: 31 MMOL/L (ref 20–32)
CREAT SERPL-MCNC: 0.96 MG/DL (ref 0.52–1.04)
DIFFERENTIAL METHOD BLD: NORMAL
EOSINOPHIL # BLD AUTO: 0 10E9/L (ref 0–0.7)
EOSINOPHIL NFR BLD AUTO: 0.2 %
ERYTHROCYTE [DISTWIDTH] IN BLOOD BY AUTOMATED COUNT: 14.9 % (ref 10–15)
GFR SERPL CREATININE-BSD FRML MDRD: 66 ML/MIN/{1.73_M2}
GLUCOSE SERPL-MCNC: 93 MG/DL (ref 70–99)
HCT VFR BLD AUTO: 39.4 % (ref 35–47)
HGB BLD-MCNC: 12.9 G/DL (ref 11.7–15.7)
IMM GRANULOCYTES # BLD: 0.1 10E9/L (ref 0–0.4)
IMM GRANULOCYTES NFR BLD: 2.2 %
LYMPHOCYTES # BLD AUTO: 0.8 10E9/L (ref 0.8–5.3)
LYMPHOCYTES NFR BLD AUTO: 17.8 %
MAGNESIUM SERPL-MCNC: 2.3 MG/DL (ref 1.6–2.3)
MCH RBC QN AUTO: 27.9 PG (ref 26.5–33)
MCHC RBC AUTO-ENTMCNC: 32.7 G/DL (ref 31.5–36.5)
MCV RBC AUTO: 85 FL (ref 78–100)
MONOCYTES # BLD AUTO: 0.4 10E9/L (ref 0–1.3)
MONOCYTES NFR BLD AUTO: 8.3 %
NEUTROPHILS # BLD AUTO: 3.2 10E9/L (ref 1.6–8.3)
NEUTROPHILS NFR BLD AUTO: 70.2 %
PLATELET # BLD AUTO: 192 10E9/L (ref 150–450)
POTASSIUM SERPL-SCNC: 2.9 MMOL/L (ref 3.4–5.3)
PROT SERPL-MCNC: 7.1 G/DL (ref 6.8–8.8)
RBC # BLD AUTO: 4.63 10E12/L (ref 3.8–5.2)
SODIUM SERPL-SCNC: 132 MMOL/L (ref 133–144)
WBC # BLD AUTO: 4.6 10E9/L (ref 4–11)

## 2020-05-26 PROCEDURE — 80053 COMPREHEN METABOLIC PANEL: CPT | Performed by: INTERNAL MEDICINE

## 2020-05-26 PROCEDURE — 96366 THER/PROPH/DIAG IV INF ADDON: CPT | Performed by: INTERNAL MEDICINE

## 2020-05-26 PROCEDURE — 96367 TX/PROPH/DG ADDL SEQ IV INF: CPT | Performed by: INTERNAL MEDICINE

## 2020-05-26 PROCEDURE — 99207 ZZC NO CHARGE NURSE ONLY: CPT

## 2020-05-26 PROCEDURE — 85025 COMPLETE CBC W/AUTO DIFF WBC: CPT | Performed by: INTERNAL MEDICINE

## 2020-05-26 PROCEDURE — 83735 ASSAY OF MAGNESIUM: CPT | Performed by: INTERNAL MEDICINE

## 2020-05-26 PROCEDURE — 96409 CHEMO IV PUSH SNGL DRUG: CPT | Performed by: INTERNAL MEDICINE

## 2020-05-26 PROCEDURE — 99207 ZZC NO CHARGE LOS: CPT

## 2020-05-26 RX ORDER — POTASSIUM CHLORIDE 1500 MG/1
20-40 TABLET, EXTENDED RELEASE ORAL
Status: DISCONTINUED | OUTPATIENT
Start: 2020-05-26 | End: 2020-05-26

## 2020-05-26 RX ORDER — POTASSIUM CL/LIDO/0.9 % NACL 10MEQ/0.1L
10 INTRAVENOUS SOLUTION, PIGGYBACK (ML) INTRAVENOUS
Status: DISCONTINUED | OUTPATIENT
Start: 2020-05-26 | End: 2020-05-26

## 2020-05-26 RX ORDER — POTASSIUM CHLORIDE 29.8 MG/ML
20 INJECTION INTRAVENOUS
Status: DISCONTINUED | OUTPATIENT
Start: 2020-05-26 | End: 2020-05-26

## 2020-05-26 RX ORDER — POTASSIUM CHLORIDE 750 MG/1
40 TABLET, EXTENDED RELEASE ORAL ONCE
Status: COMPLETED | OUTPATIENT
Start: 2020-05-26 | End: 2020-05-26

## 2020-05-26 RX ORDER — POTASSIUM CHLORIDE 7.45 MG/ML
10 INJECTION INTRAVENOUS
Status: DISCONTINUED | OUTPATIENT
Start: 2020-05-26 | End: 2020-05-26

## 2020-05-26 RX ORDER — HEPARIN SODIUM (PORCINE) LOCK FLUSH IV SOLN 100 UNIT/ML 100 UNIT/ML
5 SOLUTION INTRAVENOUS
Status: DISCONTINUED | OUTPATIENT
Start: 2020-05-26 | End: 2020-05-26 | Stop reason: HOSPADM

## 2020-05-26 RX ORDER — POTASSIUM CHLORIDE 1.5 G/1.58G
20-40 POWDER, FOR SOLUTION ORAL
Status: DISCONTINUED | OUTPATIENT
Start: 2020-05-26 | End: 2020-05-26

## 2020-05-26 RX ADMIN — POTASSIUM CHLORIDE 40 MEQ: 750 TABLET, EXTENDED RELEASE ORAL at 12:28

## 2020-05-26 RX ADMIN — HEPARIN SODIUM (PORCINE) LOCK FLUSH IV SOLN 100 UNIT/ML 5 ML: 100 SOLUTION at 11:29

## 2020-05-26 RX ADMIN — Medication 250 ML: at 11:59

## 2020-05-26 RX ADMIN — HEPARIN SODIUM (PORCINE) LOCK FLUSH IV SOLN 100 UNIT/ML 5 ML: 100 SOLUTION at 14:28

## 2020-05-26 NOTE — PROGRESS NOTES
SPIRITUAL HEALTH SERVICES  SPIRITUAL ASSESSMENT Progress Note  [facility] Oncology Care      REFERRAL SOURCE: Follow up Spiritual Health encounter.    Visited with Shaneka Alvarez  in the [facility] center for ongoing emotional/spiritual support. Patient states that she had more nausea this past weekend. Her  was concerned and called in but they decided to ride it out and not go the the ER.  Patient shared that her numbers have always been good but today she is low on potassium.  Offered support and shared reflective conversation and I affirmed that this is a difficult and uncertain journey at times.     PLAN:  Patient knows that she can request a Spiritual Health encounter as needed and I will check in with her periodically for spiritual support.     Gina Pineda  Staff

## 2020-05-26 NOTE — PROGRESS NOTES
Infusion Nursing Note:  Shaneka Alvarez presents today for C6D8 Eribulin.    Patient seen by provider today: No   present during visit today: Not Applicable.    Note: Potassium replaced per electrolyte replacement protocol.    Intravenous Access:  Implanted Port.    Treatment Conditions:  Lab Results   Component Value Date    HGB 12.9 05/26/2020     Lab Results   Component Value Date    WBC 4.6 05/26/2020      Lab Results   Component Value Date    ANEU 3.2 05/26/2020     Lab Results   Component Value Date     05/26/2020      Lab Results   Component Value Date     05/26/2020                   Lab Results   Component Value Date    POTASSIUM 2.9 05/26/2020           Lab Results   Component Value Date    MAG 2.3 05/26/2020            Lab Results   Component Value Date    CR 0.96 05/26/2020                   Lab Results   Component Value Date    RAFAELA 9.4 05/26/2020                Lab Results   Component Value Date    BILITOTAL 0.6 05/26/2020           Lab Results   Component Value Date    ALBUMIN 3.9 05/26/2020                    Lab Results   Component Value Date    ALT 28 05/26/2020           Lab Results   Component Value Date    AST 28 05/26/2020           Post Infusion Assessment:  Patient tolerated infusion without incident.  Site patent and intact, free from redness, edema or discomfort.  No evidence of extravasations.  Access discontinued per protocol.       Discharge Plan:   Patient will return 6/9 for next appointment.   Patient discharged in stable condition accompanied by: self.  Departure Mode: Ambulatory.    Cristina Loaiza RN

## 2020-06-05 ENCOUNTER — TELEPHONE (OUTPATIENT)
Dept: ONCOLOGY | Facility: CLINIC | Age: 60
End: 2020-06-05

## 2020-06-05 DIAGNOSIS — G89.3 CANCER ASSOCIATED PAIN: ICD-10-CM

## 2020-06-05 RX ORDER — MORPHINE SULFATE 15 MG/1
15 TABLET, FILM COATED, EXTENDED RELEASE ORAL EVERY EVENING
Qty: 30 TABLET | Refills: 0 | Status: SHIPPED | OUTPATIENT
Start: 2020-06-05 | End: 2020-07-02

## 2020-06-05 NOTE — TELEPHONE ENCOUNTER
[facility] Call Center    Phone Message    May a detailed message be left on voicemail: yes     Reason for Call: Medication Refill Request    Has the patient contacted the pharmacy for the refill? Yes   Name of medication being requested: Morphine Extended Release 15 MG tablets, and Trazodone 50mg tablets   Provider who prescribed the medication: Dr. Zepeda prescribed the trazodone, and Dr. Ryan prescribed the the Morphine.  Pharmacy: [facility] in [place] [facility]  Date medication is needed: ASAP, Patient has one tablet of each left and there are no refills on file. The pharmacy will not do the refill and needs us to send the rx over again.          Action Taken: Other: Messaged routed to RN pool     Travel Screening: Not Applicable

## 2020-06-05 NOTE — TELEPHONE ENCOUNTER
Requested medications MS Contin   Last refill: 05/06   Last office visit: 05/13  Scheduled for follow up     Patient would like script  eprescribed    [place]  Report reviewed.

## 2020-06-08 ENCOUNTER — VIRTUAL VISIT (OUTPATIENT)
Dept: ONCOLOGY | Facility: CLINIC | Age: 60
End: 2020-06-08
Payer: COMMERCIAL

## 2020-06-08 DIAGNOSIS — C50.919 METASTATIC BREAST CANCER: ICD-10-CM

## 2020-06-08 DIAGNOSIS — C50.911 BILATERAL MALIGNANT NEOPLASM OF BREAST IN FEMALE, UNSPECIFIED ESTROGEN RECEPTOR STATUS, UNSPECIFIED SITE OF BREAST (H): ICD-10-CM

## 2020-06-08 DIAGNOSIS — C50.912 BILATERAL MALIGNANT NEOPLASM OF BREAST IN FEMALE, UNSPECIFIED ESTROGEN RECEPTOR STATUS, UNSPECIFIED SITE OF BREAST (H): ICD-10-CM

## 2020-06-08 DIAGNOSIS — G47.00 INSOMNIA, UNSPECIFIED TYPE: Primary | ICD-10-CM

## 2020-06-08 PROCEDURE — 99213 OFFICE O/P EST LOW 20 MIN: CPT | Mod: GT | Performed by: NURSE PRACTITIONER

## 2020-06-08 RX ORDER — METHYLPREDNISOLONE SODIUM SUCCINATE 125 MG/2ML
125 INJECTION, POWDER, LYOPHILIZED, FOR SOLUTION INTRAMUSCULAR; INTRAVENOUS
Status: CANCELLED
Start: 2020-06-16

## 2020-06-08 RX ORDER — HEPARIN SODIUM,PORCINE 10 UNIT/ML
5 VIAL (ML) INTRAVENOUS
Status: CANCELLED | OUTPATIENT
Start: 2020-06-09

## 2020-06-08 RX ORDER — ALBUTEROL SULFATE 90 UG/1
1-2 AEROSOL, METERED RESPIRATORY (INHALATION)
Status: CANCELLED
Start: 2020-06-09

## 2020-06-08 RX ORDER — SODIUM CHLORIDE 9 MG/ML
1000 INJECTION, SOLUTION INTRAVENOUS CONTINUOUS PRN
Status: CANCELLED
Start: 2020-06-16

## 2020-06-08 RX ORDER — METHYLPREDNISOLONE SODIUM SUCCINATE 125 MG/2ML
125 INJECTION, POWDER, LYOPHILIZED, FOR SOLUTION INTRAMUSCULAR; INTRAVENOUS
Status: CANCELLED
Start: 2020-06-09

## 2020-06-08 RX ORDER — MEPERIDINE HYDROCHLORIDE 25 MG/ML
25 INJECTION INTRAMUSCULAR; INTRAVENOUS; SUBCUTANEOUS EVERY 30 MIN PRN
Status: CANCELLED | OUTPATIENT
Start: 2020-06-09

## 2020-06-08 RX ORDER — EPINEPHRINE 1 MG/ML
0.3 INJECTION, SOLUTION INTRAMUSCULAR; SUBCUTANEOUS EVERY 5 MIN PRN
Status: CANCELLED | OUTPATIENT
Start: 2020-06-09

## 2020-06-08 RX ORDER — SODIUM CHLORIDE 9 MG/ML
1000 INJECTION, SOLUTION INTRAVENOUS CONTINUOUS PRN
Status: CANCELLED
Start: 2020-06-09

## 2020-06-08 RX ORDER — NALOXONE HYDROCHLORIDE 0.4 MG/ML
.1-.4 INJECTION, SOLUTION INTRAMUSCULAR; INTRAVENOUS; SUBCUTANEOUS
Status: CANCELLED | OUTPATIENT
Start: 2020-06-09

## 2020-06-08 RX ORDER — ALBUTEROL SULFATE 0.83 MG/ML
2.5 SOLUTION RESPIRATORY (INHALATION)
Status: CANCELLED | OUTPATIENT
Start: 2020-06-09

## 2020-06-08 RX ORDER — HEPARIN SODIUM,PORCINE 10 UNIT/ML
5 VIAL (ML) INTRAVENOUS
Status: CANCELLED | OUTPATIENT
Start: 2020-06-16

## 2020-06-08 RX ORDER — HEPARIN SODIUM (PORCINE) LOCK FLUSH IV SOLN 100 UNIT/ML 100 UNIT/ML
5 SOLUTION INTRAVENOUS
Status: CANCELLED | OUTPATIENT
Start: 2020-06-09

## 2020-06-08 RX ORDER — ALBUTEROL SULFATE 90 UG/1
1-2 AEROSOL, METERED RESPIRATORY (INHALATION)
Status: CANCELLED
Start: 2020-06-16

## 2020-06-08 RX ORDER — LORAZEPAM 2 MG/ML
0.5 INJECTION INTRAMUSCULAR EVERY 4 HOURS PRN
Status: CANCELLED
Start: 2020-06-16

## 2020-06-08 RX ORDER — DIPHENHYDRAMINE HYDROCHLORIDE 50 MG/ML
50 INJECTION INTRAMUSCULAR; INTRAVENOUS
Status: CANCELLED
Start: 2020-06-16

## 2020-06-08 RX ORDER — EPINEPHRINE 0.3 MG/.3ML
0.3 INJECTION SUBCUTANEOUS EVERY 5 MIN PRN
Status: CANCELLED | OUTPATIENT
Start: 2020-06-09

## 2020-06-08 RX ORDER — LORAZEPAM 2 MG/ML
0.5 INJECTION INTRAMUSCULAR EVERY 4 HOURS PRN
Status: CANCELLED
Start: 2020-06-09

## 2020-06-08 RX ORDER — NALOXONE HYDROCHLORIDE 0.4 MG/ML
.1-.4 INJECTION, SOLUTION INTRAMUSCULAR; INTRAVENOUS; SUBCUTANEOUS
Status: CANCELLED | OUTPATIENT
Start: 2020-06-16

## 2020-06-08 RX ORDER — EPINEPHRINE 1 MG/ML
0.3 INJECTION, SOLUTION INTRAMUSCULAR; SUBCUTANEOUS EVERY 5 MIN PRN
Status: CANCELLED | OUTPATIENT
Start: 2020-06-16

## 2020-06-08 RX ORDER — MEPERIDINE HYDROCHLORIDE 25 MG/ML
25 INJECTION INTRAMUSCULAR; INTRAVENOUS; SUBCUTANEOUS EVERY 30 MIN PRN
Status: CANCELLED | OUTPATIENT
Start: 2020-06-16

## 2020-06-08 RX ORDER — ALBUTEROL SULFATE 0.83 MG/ML
2.5 SOLUTION RESPIRATORY (INHALATION)
Status: CANCELLED | OUTPATIENT
Start: 2020-06-16

## 2020-06-08 RX ORDER — DIPHENHYDRAMINE HYDROCHLORIDE 50 MG/ML
50 INJECTION INTRAMUSCULAR; INTRAVENOUS
Status: CANCELLED
Start: 2020-06-09

## 2020-06-08 RX ORDER — HEPARIN SODIUM (PORCINE) LOCK FLUSH IV SOLN 100 UNIT/ML 100 UNIT/ML
5 SOLUTION INTRAVENOUS
Status: CANCELLED | OUTPATIENT
Start: 2020-06-16

## 2020-06-08 RX ORDER — EPINEPHRINE 0.3 MG/.3ML
0.3 INJECTION SUBCUTANEOUS EVERY 5 MIN PRN
Status: CANCELLED | OUTPATIENT
Start: 2020-06-16

## 2020-06-08 NOTE — LETTER
6/8/2020         RE: Shaneka Alvarez  [id number] [address] [id number]        Dear Colleague,    Thank you for referring your patient, Shaneka Alvarez, to the [facility]. Please see a copy of my visit note below.    Oncology Follow Up Visit: June 8, 2020     Oncologist: Dr Dewayne Zepeda  PCP: Mohit Barcenas    Diagnosis: Metastatic Breast Cancer to brain and spine  Shaneka Alvarez is a 59 yo female who was diagnosed with Stage IIB, T2 N1 M0 invasive lobular carcinoma of the right breast  In January 2006.  Final pathology showed a 4.5 x 3.8 x 2.5 cm, 01/14 lymph nodes positive.  Estrogen, progesterone receptor positive, HER-2 negative.     Genetics- BRCA1 and 2 mutations not detected. 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  There was no evidence of new multiple sclerosis lesions.  PET scan on 8/30/2019 which showed widespread bone metastatic lesions (calvarium, spine, sacrum, pelvis, ribs most prominent at C7, T3, L1 and L4), hypermetabolic 3 cm lesion in LLL, and mediastinal/hilar LN. CEA wnl at 1.9 and C27-29 elevated to 415 (28 on 8/23/16). A CT guided biopsy of the right iliac bone was obtained on 9/4/19, pathology consistent with metastatic adenocarcinoma (breast), ER/PA negative, HER-2 negative PDL 1 < 1%. A second biopsy was take of the LLL nodule via EBUS on 9/5/19 did not show any malignancy.  C/T/L spine MRI 8/3/19 to 9/2/19 with numerous enhancing lesions of the C, T and L spine, largest around T9 and L1. No evidence of cord compression. Has a L1 compression fracture and impending L4. completed radiation therapy to T12-L5.-Neurosurgery recommended no surgical intervention but to wear Gorge brace when out of bed and HOB >30 degrees  Treatment:   2006:  ECOG 2104 protocol of dose-dense Adriamycin, Cytoxan and Avastin  x4 cycles followed by Taxol and Avastin x4 cycles.   03/2007:  Completed 1 year Avastin.   11/2006-01/2007:  Radiotherapy to the right breast of 5040 cGy.   03/[id number]:  Aromasin.  Stopped after moving to the [facility].   01/2016:  Right modified radical mastectomy with latissimus dorsi flap reconstruction and a left prophylactic mastectomy with latissimus dorsi flap reconstruction.   9/6/2019 till 9/18/2019 Received radiation T12-L5 3000 cGy in 10 fractions   -Neurosurgery recommended no surgical intervention but to wear [place] brace when out of bed and HOB >30 degrees  9/18/2019 completed T12-L5 radiation in 10 fractions =3000 cGy  10/1/2019 Xeloda x 2 trials lowering dose to 1000 mg bid  10/23/2019 Began every 3 month Zometa  11/5/2019- 1/14/2020  weekly paclitaxel x 3 cycles  1/28/2020 Eribulin    Interval History: Ms. Alvarez is contacted for review prior to continuation of use of Eribulin for her TNBC. Pt shares she is doing better since finding ways to control pain and long term recurrent headaches. She is also reporting better control of nausea noted with plan - using before and every night before bed to help with nausea and sleep- mostly using compazine since working well. Reports pain to back as 5/10 and using MS Contin 15 mg every evening and MSIR for intermittent pain by day but also using ibuprofen if pain is headache pain. She feels she is now eating well with freedom to eat her favorite foods and feels this is helping maintain her weight.  She has fallen Saturday in her garden but did not have any injuries feels this is related to her weakness and just stepping into a poorly supported area of the garden but likes getting outside because it does help with her depression as well as for strengthening.  States he has no mouth sores, shortness of breath, fevers or chills,, neuropathies skin issues, or memory changes.  Reports she continues on Effexor for depression with history of trazodone and  "wonders if she should be back on this.  States she is able to sleep with use of the pain medication and Compazine.  Reports right shin pain and being tender enough that it can make her cry with pressure, this is been over time with no increase in edema or change in color to the extremity.  Rest of comprehensive and complete ROS is reviewed and is negative.   Past Medical History:   Diagnosis Date     Breast cancer (H)     Age 44     Common migraine      H/O bilateral mastectomy      Mild major depression (H)      Multiple sclerosis (H)      Current Outpatient Medications   Medication     acetaminophen (TYLENOL) 500 MG tablet     busPIRone (BUSPAR) 15 MG tablet     calcium citrate-vitamin D (CITRACAL) 315-250 MG-UNIT TABS     Cholecalciferol (VITAMIN D3 PO)     cyclopentolate (CYCLOGYL) 1 % ophthalmic solution     diclofenac (VOLTAREN) 1 % topical gel     ibuprofen (ADVIL/MOTRIN) 600 MG tablet     LORazepam (ATIVAN) 0.5 MG tablet     magnesium 250 MG tablet     morphine (MS CONTIN) 15 MG CR tablet     morphine (MSIR) 15 MG IR tablet     naloxone (NARCAN) 1 mg/mL for intranasal kit (2 syringes with 2 mucosal atomizer device)     polyethylene glycol (MIRALAX/GLYCOLAX) packet     prochlorperazine (COMPAZINE) 10 MG tablet     propranolol (INDERAL LA) 60 MG 24 hr capsule     senna-docusate (SENOKOT-S/PERICOLACE) 8.6-50 MG tablet     syringe/needle, disp, (B-D INTEGRA SYRINGE) 23G X 1\" 3 ML MISC     traZODone (DESYREL) 50 MG tablet     venlafaxine (EFFEXOR-ER) 225 MG TB24 24 hr tablet     No current facility-administered medications for this visit.      Allergies   Allergen Reactions     Bactrim [Sulfamethoxazole W/Trimethoprim]      Internal bleeding     Copaxone [Glatiramer] Hives       Physical Exam:There were no vitals taken for this visit.   No cough and breathing easy with full sentences  Oriented and showing good spirits with excellent conversation.    Laboratory Results: Will be completed tomorrow prior to " infusion    Assessment and Plan:   Metastatic Breast Cancer to brain and spine-Pt shares today she feels she is doing well with the Eribulin though does have some nausea weakness and continues with back pain and long-term headaches.  If meeting goals with labs will continue with plan tomorrow for cycle 7 of her regularly no changes necessary.  Patient will return in 3 weeks with review prior to infusion with Dr. Zepeda.  Bone mets- Pt completed radiation therapy to T12-L5 on 9/18/2019. She continues with back pain feels this is stable and is using Zometa every 3 months which is due after 6/30/2020.  She is reminded to continue to take the calcium plus vitamin D daily for support.   Hyponatremia-recurrent intermittently- pt is taking fluids well and uing gatorade as part of fluid intake.  Hypokalemia-potassium = 2.3 with previous visit 3 weeks ago.  She is not on a regular potassium supplement suggested that she should be pushing oral food sources such as bananas tomatoes potatoes if she remains low we will need to put her on a daily potassium supplement depending on tomorrow's labs.  Back pain and recurrent headaches- with known bone metastasis- has seen palliative care and is currently on MS Contin 15mg nightly with ibuprofen and MSIR as needed noting ibuprofen may work best for headaches.Pt reporting that the headaches are not new.   Pt did have recent fall in garden and reports weakness but is getting out to garden and stay active as well as improving dietary intake which should improve strength- given praise for effort.   Weight loss- now feels she is eating more with smaller meals more often of the foods she loves and hopes to turn around the weight this week.   Depression- admits effexor is helpful. Asked about trazodone but cannot take with effexor so suggested she could use lorazepam at night instead of compazine for nausae and sleep.   MS - currently not on treatment for her MS- follows with Dr Quiles in  "Neurology  Right shin pain- will see in clinic during infusion to review for injury vs. Clot possibility.   Based upon CDC guidance and to observe social distancing in the setting of the COVID-19 pandemic, the patient was seen virtually via phone visit after failed virtual visit.   Location of pt- home  Location of provider- office in clinic.   Start of visit - 12:55pm  End of visit- 1:10  Total Time 15 min visit  Jonathan Balderas CNP      Shaneka Alvarez is a 60 year old female who is being evaluated via a billable video visit.      The patient has been notified of following:     \"This video visit will be conducted via a call between you and your physician/provider. We have found that certain health care needs can be provided without the need for an in-person physical exam.  This service lets us provide the care you need with a video conversation.  If a prescription is necessary we can send it directly to your pharmacy.  If lab work is needed we can place an order for that and you can then stop by our lab to have the test done at a later time.    Video visits are billed at different rates depending on your insurance coverage.  Please reach out to your insurance provider with any questions.    If during the course of the call the physician/provider feels a video visit is not appropriate, you will not be charged for this service.\"    Patient has given verbal consent for Video visit? Yes    How would you [facility] to [facility] your [facility]? [facility]    Patient would like the video invitation sent by: Text to cell phone: [phone]    Will anyone else be joining your video visit? No        Video-Visit Details  See provider notes- JONATHAN Arguelles          Again, thank you for allowing me to participate in the care of your patient.        Sincerely,        Annie Bailon NP, APRN CNP    "

## 2020-06-08 NOTE — PROGRESS NOTES
Oncology Follow Up Visit: June 8, 2020     Oncologist: Dr Dewayne Zepeda  PCP: Mohit Barcenas    Diagnosis: Metastatic Breast Cancer to brain and spine  Shaneka lAvarez is a 61 yo female who was diagnosed with Stage IIB, T2 N1 M0 invasive lobular carcinoma of the right breast  In January 2006.  Final pathology showed a 4.5 x 3.8 x 2.5 cm, 01/14 lymph nodes positive.  Estrogen, progesterone receptor positive, HER-2 negative.     Genetics- BRCA1 and 2 mutations not detected. 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  There was no evidence of new multiple sclerosis lesions.  PET scan on 8/30/2019 which showed widespread bone metastatic lesions (calvarium, spine, sacrum, pelvis, ribs most prominent at C7, T3, L1 and L4), hypermetabolic 3 cm lesion in LLL, and mediastinal/hilar LN. CEA wnl at 1.9 and C27-29 elevated to 415 (28 on 8/23/16). A CT guided biopsy of the right iliac bone was obtained on 9/4/19, pathology consistent with metastatic adenocarcinoma (breast), ER/TX negative, HER-2 negative PDL 1 < 1%. A second biopsy was take of the LLL nodule via EBUS on 9/5/19 did not show any malignancy.  C/T/L spine MRI 8/3/19 to 9/2/19 with numerous enhancing lesions of the C, T and L spine, largest around T9 and L1. No evidence of cord compression. Has a L1 compression fracture and impending L4. completed radiation therapy to T12-L5.-Neurosurgery recommended no surgical intervention but to wear [place] brace when out of bed and HOB >30 degrees  Treatment:   2006:  ECOG 2104 protocol of dose-dense Adriamycin, Cytoxan and Avastin x4 cycles followed by Taxol and Avastin x4 cycles.   03/2007:  Completed 1 year Avastin.   11/2006-01/2007:  Radiotherapy to the right breast of 5040 cGy.   03/[id number]:  Aromasin.  Stopped after moving to the [employer] [facility].   01/2016:  Right  modified radical mastectomy with latissimus dorsi flap reconstruction and a left prophylactic mastectomy with latissimus dorsi flap reconstruction.   9/6/2019 till 9/18/2019 Received radiation T12-L5 3000 cGy in 10 fractions   -Neurosurgery recommended no surgical intervention but to wear [place] brace when out of bed and HOB >30 degrees  9/18/2019 completed T12-L5 radiation in 10 fractions =3000 cGy  10/1/2019 Xeloda x 2 trials lowering dose to 1000 mg bid  10/23/2019 Began every 3 month Zometa  11/5/2019- 1/14/2020  weekly paclitaxel x 3 cycles  1/28/2020 Eribulin    Interval History: Ms. Alvarez is contacted for review prior to continuation of use of Eribulin for her TNBC. Pt shares she is doing better since finding ways to control pain and long term recurrent headaches. She is also reporting better control of nausea noted with plan - using before and every night before bed to help with nausea and sleep- mostly using compazine since working well. Reports pain to back as 5/10 and using MS Contin 15 mg every evening and MSIR for intermittent pain by day but also using ibuprofen if pain is headache pain. She feels she is now eating well with freedom to eat her favorite foods and feels this is helping maintain her weight.  She has fallen Saturday in her garden but did not have any injuries feels this is related to her weakness and just stepping into a poorly supported area of the garden but likes getting outside because it does help with her depression as well as for strengthening.  States he has no mouth sores, shortness of breath, fevers or chills,, neuropathies skin issues, or memory changes.  Reports she continues on Effexor for depression with history of trazodone and wonders if she should be back on this.  States she is able to sleep with use of the pain medication and Compazine.  Reports right shin pain and being tender enough that it can make her cry with pressure, this is been over time with no increase in  "edema or change in color to the extremity.  Rest of comprehensive and complete ROS is reviewed and is negative.   Past Medical History:   Diagnosis Date     Breast cancer (H)     Age 44     Common migraine      H/O bilateral mastectomy      Mild major depression (H)      Multiple sclerosis (H)      Current Outpatient Medications   Medication     acetaminophen (TYLENOL) 500 MG tablet     busPIRone (BUSPAR) 15 MG tablet     calcium citrate-vitamin D (CITRACAL) 315-250 MG-UNIT TABS     Cholecalciferol (VITAMIN D3 PO)     cyclopentolate (CYCLOGYL) 1 % ophthalmic solution     diclofenac (VOLTAREN) 1 % topical gel     ibuprofen (ADVIL/MOTRIN) 600 MG tablet     LORazepam (ATIVAN) 0.5 MG tablet     magnesium 250 MG tablet     morphine (MS CONTIN) 15 MG CR tablet     morphine (MSIR) 15 MG IR tablet     naloxone (NARCAN) 1 mg/mL for intranasal kit (2 syringes with 2 mucosal atomizer device)     polyethylene glycol (MIRALAX/GLYCOLAX) packet     prochlorperazine (COMPAZINE) 10 MG tablet     propranolol (INDERAL LA) 60 MG 24 hr capsule     senna-docusate (SENOKOT-S/PERICOLACE) 8.6-50 MG tablet     syringe/needle, disp, (B-D INTEGRA SYRINGE) 23G X 1\" 3 ML MISC     traZODone (DESYREL) 50 MG tablet     venlafaxine (EFFEXOR-ER) 225 MG TB24 24 hr tablet     No current facility-administered medications for this visit.      Allergies   Allergen Reactions     Bactrim [Sulfamethoxazole W/Trimethoprim]      Internal bleeding     Copaxone [Glatiramer] Hives       Physical Exam:There were no vitals taken for this visit.   No cough and breathing easy with full sentences  Oriented and showing good spirits with excellent conversation.    Laboratory Results: Will be completed tomorrow prior to infusion    Assessment and Plan:   Metastatic Breast Cancer to brain and spine-Pt shares today she feels she is doing well with the Eribulin though does have some nausea weakness and continues with back pain and long-term headaches.  If meeting goals with " labs will continue with plan tomorrow for cycle 7 of her regularly no changes necessary.  Patient will return in 3 weeks with review prior to infusion with Dr. Zepeda.  Bone mets- Pt completed radiation therapy to T12-L5 on 9/18/2019. She continues with back pain feels this is stable and is using Zometa every 3 months which is due after 6/30/2020.  She is reminded to continue to take the calcium plus vitamin D daily for support.   Hyponatremia-recurrent intermittently- pt is taking fluids well and uing gatorade as part of fluid intake.  Hypokalemia-potassium = 2.3 with previous visit 3 weeks ago.  She is not on a regular potassium supplement suggested that she should be pushing oral food sources such as bananas tomatoes potatoes if she remains low we will need to put her on a daily potassium supplement depending on tomorrow's labs.  Back pain and recurrent headaches- with known bone metastasis- has seen palliative care and is currently on MS Contin 15mg nightly with ibuprofen and MSIR as needed noting ibuprofen may work best for headaches.Pt reporting that the headaches are not new.   Pt did have recent fall in garden and reports weakness but is getting out to garden and stay active as well as improving dietary intake which should improve strength- given praise for effort.   Weight loss- now feels she is eating more with smaller meals more often of the foods she loves and hopes to turn around the weight this week.   Depression- admits effexor is helpful. Asked about trazodone but cannot take with effexor so suggested she could use lorazepam at night instead of compazine for nausae and sleep.   MS - currently not on treatment for her MS- follows with Dr Quiles in Neurology  Right shin pain- will see in clinic during infusion to review for injury vs. Clot possibility.   Based upon CDC guidance and to observe social distancing in the setting of the COVID-19 pandemic, the patient was seen virtually via phone visit  after failed virtual visit.   Location of pt- home  Location of provider- office in clinic.   Start of visit - 12:55pm  End of visit- 1:10  Total Time 15 min visit  Maximo Balderas CNP

## 2020-06-08 NOTE — PROGRESS NOTES
"Shaneka Alvarez is a 60 year old female who is being evaluated via a billable video visit.      The patient has been notified of following:     \"This video visit will be conducted via a call between you and your physician/provider. We have found that certain health care needs can be provided without the need for an in-person physical exam.  This service lets us provide the care you need with a video conversation.  If a prescription is necessary we can send it directly to your pharmacy.  If lab work is needed we can place an order for that and you can then stop by our lab to have the test done at a later time.    Video visits are billed at different rates depending on your insurance coverage.  Please reach out to your insurance provider with any questions.    If during the course of the call the physician/provider feels a video visit is not appropriate, you will not be charged for this service.\"    Patient has given verbal consent for Video visit? Yes    How would you like to obtain your AVS? Susan    Patient would like the video invitation sent by: Text to cell phone: [phone]    Will anyone else be joining your video visit? No        Video-Visit Details  See provider notes- JONATHAN Arguelles        "

## 2020-06-09 ENCOUNTER — INFUSION THERAPY VISIT (OUTPATIENT)
Dept: INFUSION THERAPY | Facility: CLINIC | Age: 60
End: 2020-06-09
Payer: COMMERCIAL

## 2020-06-09 ENCOUNTER — ONCOLOGY VISIT (OUTPATIENT)
Dept: ONCOLOGY | Facility: CLINIC | Age: 60
End: 2020-06-09
Payer: COMMERCIAL

## 2020-06-09 VITALS
RESPIRATION RATE: 14 BRPM | SYSTOLIC BLOOD PRESSURE: 120 MMHG | BODY MASS INDEX: 24.87 KG/M2 | OXYGEN SATURATION: 100 % | DIASTOLIC BLOOD PRESSURE: 79 MMHG | TEMPERATURE: 98.1 F | HEART RATE: 66 BPM | WEIGHT: 136 LBS

## 2020-06-09 DIAGNOSIS — C50.911 BILATERAL MALIGNANT NEOPLASM OF BREAST IN FEMALE, UNSPECIFIED ESTROGEN RECEPTOR STATUS, UNSPECIFIED SITE OF BREAST (H): ICD-10-CM

## 2020-06-09 DIAGNOSIS — C50.919 METASTATIC BREAST CANCER: Primary | ICD-10-CM

## 2020-06-09 DIAGNOSIS — C50.912 BILATERAL MALIGNANT NEOPLASM OF BREAST IN FEMALE, UNSPECIFIED ESTROGEN RECEPTOR STATUS, UNSPECIFIED SITE OF BREAST (H): ICD-10-CM

## 2020-06-09 LAB
ALBUMIN SERPL-MCNC: 3.3 G/DL (ref 3.4–5)
ALP SERPL-CCNC: 74 U/L (ref 40–150)
ALT SERPL W P-5'-P-CCNC: 21 U/L (ref 0–50)
ANION GAP SERPL CALCULATED.3IONS-SCNC: 3 MMOL/L (ref 3–14)
AST SERPL W P-5'-P-CCNC: 21 U/L (ref 0–45)
BASOPHILS # BLD AUTO: 0.1 10E9/L (ref 0–0.2)
BASOPHILS NFR BLD AUTO: 2 %
BILIRUB SERPL-MCNC: 0.2 MG/DL (ref 0.2–1.3)
BUN SERPL-MCNC: 8 MG/DL (ref 7–30)
CALCIUM SERPL-MCNC: 9 MG/DL (ref 8.5–10.1)
CANCER AG27-29 SERPL-ACNC: 205 U/ML (ref 0–39)
CHLORIDE SERPL-SCNC: 104 MMOL/L (ref 94–109)
CO2 SERPL-SCNC: 31 MMOL/L (ref 20–32)
CREAT SERPL-MCNC: 0.7 MG/DL (ref 0.52–1.04)
DIFFERENTIAL METHOD BLD: ABNORMAL
ERYTHROCYTE [DISTWIDTH] IN BLOOD BY AUTOMATED COUNT: 15.4 % (ref 10–15)
GFR SERPL CREATININE-BSD FRML MDRD: >90 ML/MIN/{1.73_M2}
GLUCOSE SERPL-MCNC: 108 MG/DL (ref 70–99)
HCT VFR BLD AUTO: 35.1 % (ref 35–47)
HGB BLD-MCNC: 11.2 G/DL (ref 11.7–15.7)
LYMPHOCYTES # BLD AUTO: 0.6 10E9/L (ref 0.8–5.3)
LYMPHOCYTES NFR BLD AUTO: 24 %
MAGNESIUM SERPL-MCNC: 2.2 MG/DL (ref 1.6–2.3)
MCH RBC QN AUTO: 28 PG (ref 26.5–33)
MCHC RBC AUTO-ENTMCNC: 31.9 G/DL (ref 31.5–36.5)
MCV RBC AUTO: 88 FL (ref 78–100)
MONOCYTES # BLD AUTO: 0.5 10E9/L (ref 0–1.3)
MONOCYTES NFR BLD AUTO: 18 %
NEUTROPHILS # BLD AUTO: 1.3 10E9/L (ref 1.6–8.3)
NEUTROPHILS NFR BLD AUTO: 56 %
PLATELET # BLD AUTO: 171 10E9/L (ref 150–450)
PLATELET # BLD EST: ABNORMAL 10*3/UL
POTASSIUM SERPL-SCNC: 4.2 MMOL/L (ref 3.4–5.3)
PROT SERPL-MCNC: 6.3 G/DL (ref 6.8–8.8)
RBC # BLD AUTO: 4 10E12/L (ref 3.8–5.2)
SODIUM SERPL-SCNC: 138 MMOL/L (ref 133–144)
WBC # BLD AUTO: 2.5 10E9/L (ref 4–11)

## 2020-06-09 PROCEDURE — 83735 ASSAY OF MAGNESIUM: CPT | Performed by: INTERNAL MEDICINE

## 2020-06-09 PROCEDURE — 96367 TX/PROPH/DG ADDL SEQ IV INF: CPT | Performed by: NURSE PRACTITIONER

## 2020-06-09 PROCEDURE — 96409 CHEMO IV PUSH SNGL DRUG: CPT | Performed by: NURSE PRACTITIONER

## 2020-06-09 PROCEDURE — 86300 IMMUNOASSAY TUMOR CA 15-3: CPT | Performed by: INTERNAL MEDICINE

## 2020-06-09 PROCEDURE — 99207 ZZC NO CHARGE LOS: CPT

## 2020-06-09 PROCEDURE — 99207 ZZC NO CHARGE NURSE ONLY: CPT

## 2020-06-09 PROCEDURE — 85025 COMPLETE CBC W/AUTO DIFF WBC: CPT | Performed by: INTERNAL MEDICINE

## 2020-06-09 PROCEDURE — 80053 COMPREHEN METABOLIC PANEL: CPT | Performed by: INTERNAL MEDICINE

## 2020-06-09 RX ORDER — HEPARIN SODIUM (PORCINE) LOCK FLUSH IV SOLN 100 UNIT/ML 100 UNIT/ML
5 SOLUTION INTRAVENOUS
Status: DISCONTINUED | OUTPATIENT
Start: 2020-06-09 | End: 2020-06-09 | Stop reason: HOSPADM

## 2020-06-09 RX ADMIN — HEPARIN SODIUM (PORCINE) LOCK FLUSH IV SOLN 100 UNIT/ML 5 ML: 100 SOLUTION at 11:21

## 2020-06-09 RX ADMIN — HEPARIN SODIUM (PORCINE) LOCK FLUSH IV SOLN 100 UNIT/ML 5 ML: 100 SOLUTION at 13:08

## 2020-06-09 RX ADMIN — Medication 250 ML: at 12:08

## 2020-06-09 ASSESSMENT — PAIN SCALES - GENERAL: PAINLEVEL: NO PAIN (0)

## 2020-06-09 NOTE — PROGRESS NOTES
Port needle-one inch- left accessed for treatment. Tolerated port access and draw without complaint. Port site scrubbed with Chloraprep for 30 seconds and allowed to dry completely prior to dressing application. Accessed using sterile technique. Hurley tubes drawn-Red gel/Green/Purple tubes. Double signed by patient and RN. See documentation flowsheet. Mariah Paniagua, RN, BSN, OCN

## 2020-06-09 NOTE — PROGRESS NOTES
Infusion Nursing Note:  Shaneka Alvarez presents today for Eribulin.    Patient seen by provider today: No   present during visit today: Not Applicable.    Note: N/A.    Intravenous Access:  Labs drawn without difficulty.  Implanted Port.    Treatment Conditions:  Lab Results   Component Value Date    HGB 11.2 06/09/2020     Lab Results   Component Value Date    WBC 2.5 06/09/2020      Lab Results   Component Value Date    ANEU 1.3 06/09/2020     Lab Results   Component Value Date     06/09/2020      Lab Results   Component Value Date     06/09/2020                   Lab Results   Component Value Date    POTASSIUM 4.2 06/09/2020           Lab Results   Component Value Date    MAG 2.2 06/09/2020            Lab Results   Component Value Date    CR 0.70 06/09/2020                   Lab Results   Component Value Date    RAFAELA 9.0 06/09/2020                Lab Results   Component Value Date    BILITOTAL 0.2 06/09/2020           Lab Results   Component Value Date    ALBUMIN 3.3 06/09/2020                    Lab Results   Component Value Date    ALT 21 06/09/2020           Lab Results   Component Value Date    AST 21 06/09/2020       Results reviewed, labs MET treatment parameters, ok to proceed with treatment.      Post Infusion Assessment:  Patient tolerated infusion without incident.  Patient observed for 15 minutes post Eribulin per protocol.  Blood return noted pre and post infusion.  Site patent and intact, free from redness, edema or discomfort.  No evidence of extravasations.  Access discontinued per protocol.       Discharge Plan:   Discharge instructions reviewed with: Patient.  Patient discharged in stable condition accompanied by: self.  Departure Mode: Ambulatory.    Paige Damian RN

## 2020-06-16 ENCOUNTER — ONCOLOGY VISIT (OUTPATIENT)
Dept: ONCOLOGY | Facility: CLINIC | Age: 60
End: 2020-06-16
Payer: COMMERCIAL

## 2020-06-16 ENCOUNTER — INFUSION THERAPY VISIT (OUTPATIENT)
Dept: INFUSION THERAPY | Facility: CLINIC | Age: 60
End: 2020-06-16
Payer: COMMERCIAL

## 2020-06-16 VITALS
DIASTOLIC BLOOD PRESSURE: 85 MMHG | TEMPERATURE: 98.3 F | OXYGEN SATURATION: 98 % | RESPIRATION RATE: 16 BRPM | WEIGHT: 131.6 LBS | HEART RATE: 69 BPM | BODY MASS INDEX: 24.07 KG/M2 | SYSTOLIC BLOOD PRESSURE: 122 MMHG

## 2020-06-16 DIAGNOSIS — C50.912 BILATERAL MALIGNANT NEOPLASM OF BREAST IN FEMALE, UNSPECIFIED ESTROGEN RECEPTOR STATUS, UNSPECIFIED SITE OF BREAST (H): ICD-10-CM

## 2020-06-16 DIAGNOSIS — C50.919 METASTATIC BREAST CANCER: Primary | ICD-10-CM

## 2020-06-16 DIAGNOSIS — C50.911 BILATERAL MALIGNANT NEOPLASM OF BREAST IN FEMALE, UNSPECIFIED ESTROGEN RECEPTOR STATUS, UNSPECIFIED SITE OF BREAST (H): ICD-10-CM

## 2020-06-16 LAB
ALBUMIN SERPL-MCNC: 3.5 G/DL (ref 3.4–5)
ALP SERPL-CCNC: 71 U/L (ref 40–150)
ALT SERPL W P-5'-P-CCNC: 24 U/L (ref 0–50)
ANION GAP SERPL CALCULATED.3IONS-SCNC: 6 MMOL/L (ref 3–14)
AST SERPL W P-5'-P-CCNC: 18 U/L (ref 0–45)
BASOPHILS # BLD AUTO: 0.1 10E9/L (ref 0–0.2)
BASOPHILS NFR BLD AUTO: 1 %
BILIRUB SERPL-MCNC: 0.3 MG/DL (ref 0.2–1.3)
BUN SERPL-MCNC: 7 MG/DL (ref 7–30)
CALCIUM SERPL-MCNC: 8.6 MG/DL (ref 8.5–10.1)
CHLORIDE SERPL-SCNC: 105 MMOL/L (ref 94–109)
CO2 SERPL-SCNC: 27 MMOL/L (ref 20–32)
CREAT SERPL-MCNC: 0.66 MG/DL (ref 0.52–1.04)
DIFFERENTIAL METHOD BLD: ABNORMAL
EOSINOPHIL # BLD AUTO: 0 10E9/L (ref 0–0.7)
EOSINOPHIL NFR BLD AUTO: 0.4 %
ERYTHROCYTE [DISTWIDTH] IN BLOOD BY AUTOMATED COUNT: 15.2 % (ref 10–15)
GFR SERPL CREATININE-BSD FRML MDRD: >90 ML/MIN/{1.73_M2}
GLUCOSE SERPL-MCNC: 123 MG/DL (ref 70–99)
HCT VFR BLD AUTO: 39.4 % (ref 35–47)
HGB BLD-MCNC: 12.6 G/DL (ref 11.7–15.7)
IMM GRANULOCYTES # BLD: 0.1 10E9/L (ref 0–0.4)
IMM GRANULOCYTES NFR BLD: 1.6 %
LYMPHOCYTES # BLD AUTO: 0.7 10E9/L (ref 0.8–5.3)
LYMPHOCYTES NFR BLD AUTO: 13.3 %
MAGNESIUM SERPL-MCNC: 2.2 MG/DL (ref 1.6–2.3)
MCH RBC QN AUTO: 27.9 PG (ref 26.5–33)
MCHC RBC AUTO-ENTMCNC: 32 G/DL (ref 31.5–36.5)
MCV RBC AUTO: 87 FL (ref 78–100)
MONOCYTES # BLD AUTO: 0.2 10E9/L (ref 0–1.3)
MONOCYTES NFR BLD AUTO: 4.2 %
NEUTROPHILS # BLD AUTO: 4 10E9/L (ref 1.6–8.3)
NEUTROPHILS NFR BLD AUTO: 79.5 %
PLATELET # BLD AUTO: 203 10E9/L (ref 150–450)
POTASSIUM SERPL-SCNC: 3.5 MMOL/L (ref 3.4–5.3)
PROT SERPL-MCNC: 6.6 G/DL (ref 6.8–8.8)
RBC # BLD AUTO: 4.52 10E12/L (ref 3.8–5.2)
SODIUM SERPL-SCNC: 138 MMOL/L (ref 133–144)
WBC # BLD AUTO: 5.1 10E9/L (ref 4–11)

## 2020-06-16 PROCEDURE — 99207 ZZC NO CHARGE LOS: CPT

## 2020-06-16 PROCEDURE — 85025 COMPLETE CBC W/AUTO DIFF WBC: CPT | Performed by: INTERNAL MEDICINE

## 2020-06-16 PROCEDURE — 99207 ZZC NO CHARGE NURSE ONLY: CPT

## 2020-06-16 PROCEDURE — 96367 TX/PROPH/DG ADDL SEQ IV INF: CPT | Performed by: NURSE PRACTITIONER

## 2020-06-16 PROCEDURE — 96409 CHEMO IV PUSH SNGL DRUG: CPT | Performed by: NURSE PRACTITIONER

## 2020-06-16 PROCEDURE — 83735 ASSAY OF MAGNESIUM: CPT | Performed by: INTERNAL MEDICINE

## 2020-06-16 PROCEDURE — 80053 COMPREHEN METABOLIC PANEL: CPT | Performed by: INTERNAL MEDICINE

## 2020-06-16 RX ORDER — HEPARIN SODIUM (PORCINE) LOCK FLUSH IV SOLN 100 UNIT/ML 100 UNIT/ML
5 SOLUTION INTRAVENOUS
Status: DISCONTINUED | OUTPATIENT
Start: 2020-06-16 | End: 2020-06-16 | Stop reason: HOSPADM

## 2020-06-16 RX ADMIN — HEPARIN SODIUM (PORCINE) LOCK FLUSH IV SOLN 100 UNIT/ML 5 ML: 100 SOLUTION at 13:32

## 2020-06-16 RX ADMIN — HEPARIN SODIUM (PORCINE) LOCK FLUSH IV SOLN 100 UNIT/ML 5 ML: 100 SOLUTION at 12:03

## 2020-06-16 RX ADMIN — Medication 250 ML: at 12:44

## 2020-06-16 ASSESSMENT — PAIN SCALES - GENERAL: PAINLEVEL: MILD PAIN (2)

## 2020-06-16 NOTE — PROGRESS NOTES
SPIRITUAL HEALTH SERVICES  SPIRITUAL ASSESSMENT Progress Note  [facility]       REFERRAL SOURCE: Follow up Spiritual Health encounter     Visited with Shaneka Alvarez  in the [facility] center  for ongoing emotional/spiritual support.  Patient states that overall she is doing well.  Her  is working less and they are planning a trip in early July to [place] to see her daughter and 11 year old granddaughter and 7 year old grandson.  She is feeling good about the return of her eyesight and has been spending time outside looking at all the beautiful colors/flowers and finding a new appreciation for sight. Offered her a blessing for her upcoming trip.    PLAN:  Patient knows that she can request a Spiritual Health encounter as needed and I will check in with her periodically for spiritual support.     Gina Pineda  Staff

## 2020-06-16 NOTE — PROGRESS NOTES
Port gave blood return and then stopped. Resumed after multiple flushes. Port needle left accessed for treatment. Tolerated port access and draw without complaint. Port site scrubbed with Chloraprep for 30 seconds and allowed to dry completely prior to dressing application. Accessed using sterile technique. [place] tubes drawn-Red gel/Green/Purple tubes. Double signed by patient and RN. See documentation flowsheet. Mariah Paniagua, RN, BSN, OCN

## 2020-06-16 NOTE — PROGRESS NOTES
"Infusion Nursing Note:  Shaneka Alvarez presents today for C7D8 Eribulin.    Patient seen by provider today: No   present during visit today: Not Applicable.    Note: Patient states she chronic spine pain is at a \"3\"/10 today. Morphine dose works well to control pain at nighttime. Some nausea on occasion and Ativan helps with that. VSS. No new concerns.     Intravenous Access:  Implanted Port.    Treatment Conditions:  Lab Results   Component Value Date    HGB 12.6 06/16/2020     Lab Results   Component Value Date    WBC 5.1 06/16/2020      Lab Results   Component Value Date    ANEU 4.0 06/16/2020     Lab Results   Component Value Date     06/16/2020      Results reviewed, labs MET treatment parameters, ok to proceed with treatment.      Post Infusion Assessment:  Patient tolerated infusion without incident.  Blood return noted pre infusion, every 2-3 mls with Eribulin administration and post infusion.  Site patent and intact, free from redness, edema or discomfort.  No evidence of extravasations.  Access discontinued per protocol.       Discharge Plan:   Copy of AVS reviewed with patient and/or family.  Patient will return 6/30 for next appointment.  Patient discharged in stable condition accompanied by: self.  Departure Mode: Ambulatory.    Hafsa Bazan RN                      "

## 2020-06-24 DIAGNOSIS — C50.912 BILATERAL MALIGNANT NEOPLASM OF BREAST IN FEMALE, UNSPECIFIED ESTROGEN RECEPTOR STATUS, UNSPECIFIED SITE OF BREAST (H): ICD-10-CM

## 2020-06-24 DIAGNOSIS — C50.919 METASTATIC BREAST CANCER: Primary | ICD-10-CM

## 2020-06-24 DIAGNOSIS — C50.911 BILATERAL MALIGNANT NEOPLASM OF BREAST IN FEMALE, UNSPECIFIED ESTROGEN RECEPTOR STATUS, UNSPECIFIED SITE OF BREAST (H): ICD-10-CM

## 2020-06-24 RX ORDER — HEPARIN SODIUM,PORCINE 10 UNIT/ML
5 VIAL (ML) INTRAVENOUS
Status: CANCELLED | OUTPATIENT
Start: 2020-06-30

## 2020-06-24 RX ORDER — ALBUTEROL SULFATE 90 UG/1
1-2 AEROSOL, METERED RESPIRATORY (INHALATION)
Status: CANCELLED
Start: 2020-07-07

## 2020-06-24 RX ORDER — METHYLPREDNISOLONE SODIUM SUCCINATE 125 MG/2ML
125 INJECTION, POWDER, LYOPHILIZED, FOR SOLUTION INTRAMUSCULAR; INTRAVENOUS
Status: CANCELLED
Start: 2020-06-30

## 2020-06-24 RX ORDER — HEPARIN SODIUM (PORCINE) LOCK FLUSH IV SOLN 100 UNIT/ML 100 UNIT/ML
5 SOLUTION INTRAVENOUS
Status: CANCELLED | OUTPATIENT
Start: 2020-06-30

## 2020-06-24 RX ORDER — EPINEPHRINE 1 MG/ML
0.3 INJECTION, SOLUTION INTRAMUSCULAR; SUBCUTANEOUS EVERY 5 MIN PRN
Status: CANCELLED | OUTPATIENT
Start: 2020-07-07

## 2020-06-24 RX ORDER — MEPERIDINE HYDROCHLORIDE 25 MG/ML
25 INJECTION INTRAMUSCULAR; INTRAVENOUS; SUBCUTANEOUS EVERY 30 MIN PRN
Status: CANCELLED | OUTPATIENT
Start: 2020-07-07

## 2020-06-24 RX ORDER — HEPARIN SODIUM (PORCINE) LOCK FLUSH IV SOLN 100 UNIT/ML 100 UNIT/ML
5 SOLUTION INTRAVENOUS
Status: CANCELLED | OUTPATIENT
Start: 2020-07-07

## 2020-06-24 RX ORDER — EPINEPHRINE 0.3 MG/.3ML
0.3 INJECTION SUBCUTANEOUS EVERY 5 MIN PRN
Status: CANCELLED | OUTPATIENT
Start: 2020-07-07

## 2020-06-24 RX ORDER — EPINEPHRINE 0.3 MG/.3ML
0.3 INJECTION SUBCUTANEOUS EVERY 5 MIN PRN
Status: CANCELLED | OUTPATIENT
Start: 2020-06-30

## 2020-06-24 RX ORDER — NALOXONE HYDROCHLORIDE 0.4 MG/ML
.1-.4 INJECTION, SOLUTION INTRAMUSCULAR; INTRAVENOUS; SUBCUTANEOUS
Status: CANCELLED | OUTPATIENT
Start: 2020-06-30

## 2020-06-24 RX ORDER — MEPERIDINE HYDROCHLORIDE 25 MG/ML
25 INJECTION INTRAMUSCULAR; INTRAVENOUS; SUBCUTANEOUS EVERY 30 MIN PRN
Status: CANCELLED | OUTPATIENT
Start: 2020-06-30

## 2020-06-24 RX ORDER — NALOXONE HYDROCHLORIDE 0.4 MG/ML
.1-.4 INJECTION, SOLUTION INTRAMUSCULAR; INTRAVENOUS; SUBCUTANEOUS
Status: CANCELLED | OUTPATIENT
Start: 2020-07-07

## 2020-06-24 RX ORDER — EPINEPHRINE 1 MG/ML
0.3 INJECTION, SOLUTION INTRAMUSCULAR; SUBCUTANEOUS EVERY 5 MIN PRN
Status: CANCELLED | OUTPATIENT
Start: 2020-06-30

## 2020-06-24 RX ORDER — LORAZEPAM 2 MG/ML
0.5 INJECTION INTRAMUSCULAR EVERY 4 HOURS PRN
Status: CANCELLED
Start: 2020-06-30

## 2020-06-24 RX ORDER — SODIUM CHLORIDE 9 MG/ML
1000 INJECTION, SOLUTION INTRAVENOUS CONTINUOUS PRN
Status: CANCELLED
Start: 2020-07-07

## 2020-06-24 RX ORDER — DIPHENHYDRAMINE HYDROCHLORIDE 50 MG/ML
50 INJECTION INTRAMUSCULAR; INTRAVENOUS
Status: CANCELLED
Start: 2020-07-07

## 2020-06-24 RX ORDER — DIPHENHYDRAMINE HYDROCHLORIDE 50 MG/ML
50 INJECTION INTRAMUSCULAR; INTRAVENOUS
Status: CANCELLED
Start: 2020-06-30

## 2020-06-24 RX ORDER — ALBUTEROL SULFATE 90 UG/1
1-2 AEROSOL, METERED RESPIRATORY (INHALATION)
Status: CANCELLED
Start: 2020-06-30

## 2020-06-24 RX ORDER — HEPARIN SODIUM,PORCINE 10 UNIT/ML
5 VIAL (ML) INTRAVENOUS
Status: CANCELLED | OUTPATIENT
Start: 2020-07-07

## 2020-06-24 RX ORDER — ALBUTEROL SULFATE 0.83 MG/ML
2.5 SOLUTION RESPIRATORY (INHALATION)
Status: CANCELLED | OUTPATIENT
Start: 2020-06-30

## 2020-06-24 RX ORDER — LORAZEPAM 2 MG/ML
0.5 INJECTION INTRAMUSCULAR EVERY 4 HOURS PRN
Status: CANCELLED
Start: 2020-07-07

## 2020-06-24 RX ORDER — ALBUTEROL SULFATE 0.83 MG/ML
2.5 SOLUTION RESPIRATORY (INHALATION)
Status: CANCELLED | OUTPATIENT
Start: 2020-07-07

## 2020-06-24 RX ORDER — METHYLPREDNISOLONE SODIUM SUCCINATE 125 MG/2ML
125 INJECTION, POWDER, LYOPHILIZED, FOR SOLUTION INTRAMUSCULAR; INTRAVENOUS
Status: CANCELLED
Start: 2020-07-07

## 2020-06-24 RX ORDER — SODIUM CHLORIDE 9 MG/ML
1000 INJECTION, SOLUTION INTRAVENOUS CONTINUOUS PRN
Status: CANCELLED
Start: 2020-06-30

## 2020-06-29 DIAGNOSIS — C50.919 METASTATIC BREAST CANCER: ICD-10-CM

## 2020-06-29 DIAGNOSIS — C79.51 BONE METASTASES: Primary | ICD-10-CM

## 2020-06-29 RX ORDER — ZOLEDRONIC ACID 0.04 MG/ML
4 INJECTION, SOLUTION INTRAVENOUS ONCE
Status: CANCELLED | OUTPATIENT
Start: 2020-06-30

## 2020-06-30 ENCOUNTER — VIRTUAL VISIT (OUTPATIENT)
Dept: ONCOLOGY | Facility: CLINIC | Age: 60
End: 2020-06-30
Payer: COMMERCIAL

## 2020-06-30 ENCOUNTER — ONCOLOGY VISIT (OUTPATIENT)
Dept: ONCOLOGY | Facility: CLINIC | Age: 60
End: 2020-06-30
Payer: COMMERCIAL

## 2020-06-30 ENCOUNTER — INFUSION THERAPY VISIT (OUTPATIENT)
Dept: INFUSION THERAPY | Facility: CLINIC | Age: 60
End: 2020-06-30
Payer: COMMERCIAL

## 2020-06-30 VITALS
HEART RATE: 78 BPM | TEMPERATURE: 97.6 F | WEIGHT: 129 LBS | OXYGEN SATURATION: 96 % | SYSTOLIC BLOOD PRESSURE: 116 MMHG | DIASTOLIC BLOOD PRESSURE: 81 MMHG | BODY MASS INDEX: 23.59 KG/M2

## 2020-06-30 DIAGNOSIS — C50.912 BILATERAL MALIGNANT NEOPLASM OF BREAST IN FEMALE, UNSPECIFIED ESTROGEN RECEPTOR STATUS, UNSPECIFIED SITE OF BREAST (H): ICD-10-CM

## 2020-06-30 DIAGNOSIS — C50.911 BILATERAL MALIGNANT NEOPLASM OF BREAST IN FEMALE, UNSPECIFIED ESTROGEN RECEPTOR STATUS, UNSPECIFIED SITE OF BREAST (H): ICD-10-CM

## 2020-06-30 DIAGNOSIS — C50.919 METASTATIC BREAST CANCER: Primary | ICD-10-CM

## 2020-06-30 DIAGNOSIS — C79.51 BONE METASTASES: ICD-10-CM

## 2020-06-30 DIAGNOSIS — C50.919 METASTATIC BREAST CANCER: ICD-10-CM

## 2020-06-30 DIAGNOSIS — C79.51 BONE METASTASES: Primary | ICD-10-CM

## 2020-06-30 LAB
ALBUMIN SERPL-MCNC: 3.6 G/DL (ref 3.4–5)
ALP SERPL-CCNC: 84 U/L (ref 40–150)
ALT SERPL W P-5'-P-CCNC: 28 U/L (ref 0–50)
ANION GAP SERPL CALCULATED.3IONS-SCNC: 5 MMOL/L (ref 3–14)
AST SERPL W P-5'-P-CCNC: 21 U/L (ref 0–45)
BASOPHILS # BLD AUTO: 0 10E9/L (ref 0–0.2)
BASOPHILS NFR BLD AUTO: 1.1 %
BILIRUB SERPL-MCNC: 0.4 MG/DL (ref 0.2–1.3)
BUN SERPL-MCNC: 9 MG/DL (ref 7–30)
CALCIUM SERPL-MCNC: 9.1 MG/DL (ref 8.5–10.1)
CANCER AG27-29 SERPL-ACNC: 308 U/ML (ref 0–39)
CHLORIDE SERPL-SCNC: 104 MMOL/L (ref 94–109)
CO2 SERPL-SCNC: 28 MMOL/L (ref 20–32)
CREAT SERPL-MCNC: 0.67 MG/DL (ref 0.52–1.04)
DIFFERENTIAL METHOD BLD: ABNORMAL
EOSINOPHIL # BLD AUTO: 0 10E9/L (ref 0–0.7)
EOSINOPHIL NFR BLD AUTO: 0.3 %
ERYTHROCYTE [DISTWIDTH] IN BLOOD BY AUTOMATED COUNT: 15.6 % (ref 10–15)
GFR SERPL CREATININE-BSD FRML MDRD: >90 ML/MIN/{1.73_M2}
GLUCOSE SERPL-MCNC: 103 MG/DL (ref 70–99)
HCT VFR BLD AUTO: 40.4 % (ref 35–47)
HGB BLD-MCNC: 13 G/DL (ref 11.7–15.7)
IMM GRANULOCYTES # BLD: 0 10E9/L (ref 0–0.4)
IMM GRANULOCYTES NFR BLD: 0.9 %
LYMPHOCYTES # BLD AUTO: 0.6 10E9/L (ref 0.8–5.3)
LYMPHOCYTES NFR BLD AUTO: 17.4 %
MAGNESIUM SERPL-MCNC: 2.1 MG/DL (ref 1.6–2.3)
MCH RBC QN AUTO: 28 PG (ref 26.5–33)
MCHC RBC AUTO-ENTMCNC: 32.2 G/DL (ref 31.5–36.5)
MCV RBC AUTO: 87 FL (ref 78–100)
MONOCYTES # BLD AUTO: 0.6 10E9/L (ref 0–1.3)
MONOCYTES NFR BLD AUTO: 18 %
NEUTROPHILS # BLD AUTO: 2.2 10E9/L (ref 1.6–8.3)
NEUTROPHILS NFR BLD AUTO: 62.3 %
PLATELET # BLD AUTO: 188 10E9/L (ref 150–450)
POTASSIUM SERPL-SCNC: 4 MMOL/L (ref 3.4–5.3)
PROT SERPL-MCNC: 6.8 G/DL (ref 6.8–8.8)
RBC # BLD AUTO: 4.65 10E12/L (ref 3.8–5.2)
SODIUM SERPL-SCNC: 137 MMOL/L (ref 133–144)
WBC # BLD AUTO: 3.5 10E9/L (ref 4–11)

## 2020-06-30 PROCEDURE — 86300 IMMUNOASSAY TUMOR CA 15-3: CPT | Performed by: INTERNAL MEDICINE

## 2020-06-30 PROCEDURE — 80053 COMPREHEN METABOLIC PANEL: CPT | Performed by: INTERNAL MEDICINE

## 2020-06-30 PROCEDURE — 96367 TX/PROPH/DG ADDL SEQ IV INF: CPT | Performed by: NURSE PRACTITIONER

## 2020-06-30 PROCEDURE — 96409 CHEMO IV PUSH SNGL DRUG: CPT | Performed by: NURSE PRACTITIONER

## 2020-06-30 PROCEDURE — 83735 ASSAY OF MAGNESIUM: CPT | Performed by: INTERNAL MEDICINE

## 2020-06-30 PROCEDURE — 99207 ZZC NO CHARGE NURSE ONLY: CPT

## 2020-06-30 PROCEDURE — 99207 ZZC NO CHARGE LOS: CPT

## 2020-06-30 PROCEDURE — 99214 OFFICE O/P EST MOD 30 MIN: CPT | Mod: GT | Performed by: INTERNAL MEDICINE

## 2020-06-30 PROCEDURE — 85025 COMPLETE CBC W/AUTO DIFF WBC: CPT | Performed by: INTERNAL MEDICINE

## 2020-06-30 RX ORDER — HEPARIN SODIUM (PORCINE) LOCK FLUSH IV SOLN 100 UNIT/ML 100 UNIT/ML
5 SOLUTION INTRAVENOUS
Status: DISCONTINUED | OUTPATIENT
Start: 2020-06-30 | End: 2020-06-30 | Stop reason: HOSPADM

## 2020-06-30 RX ORDER — ZOLEDRONIC ACID 0.04 MG/ML
4 INJECTION, SOLUTION INTRAVENOUS ONCE
Status: COMPLETED | OUTPATIENT
Start: 2020-06-30 | End: 2020-06-30

## 2020-06-30 RX ADMIN — HEPARIN SODIUM (PORCINE) LOCK FLUSH IV SOLN 100 UNIT/ML 5 ML: 100 SOLUTION at 14:48

## 2020-06-30 RX ADMIN — Medication 250 ML: at 13:49

## 2020-06-30 RX ADMIN — ZOLEDRONIC ACID 4 MG: 0.04 INJECTION, SOLUTION INTRAVENOUS at 13:51

## 2020-06-30 RX ADMIN — HEPARIN SODIUM (PORCINE) LOCK FLUSH IV SOLN 100 UNIT/ML 5 ML: 100 SOLUTION at 12:07

## 2020-06-30 ASSESSMENT — PAIN SCALES - GENERAL: PAINLEVEL: NO PAIN (0)

## 2020-06-30 NOTE — PROGRESS NOTES
Infusion Nursing Note:  Shaneka Alvarez presents today for Eribulin.    Patient seen by provider today: No   present during visit today: Not Applicable.    Note: n/a.    Intravenous Access:  Implanted Port.    Treatment Conditions:  Results reviewed, labs MET treatment parameters, ok to proceed with treatment.      Post Infusion Assessment:  Patient tolerated infusion without incident.       Discharge Plan:   RTC 7/7 for D8  .    PROSPER MCCRACKEN RN

## 2020-06-30 NOTE — PROGRESS NOTES
Port needle-one inch- left accessed for treatment. Tolerated port access and draw without complaint. Port site scrubbed with Chloraprep for 30 seconds and allowed to dry completely prior to Opsite  dressing application. Accessed using sterile technique. [place] tubes drawn-Red gel/Green/Purple tubes. Double signed by patient and RN. See documentation flowsheet. Mariah Paniagua, RN, BSN, OCN

## 2020-06-30 NOTE — NURSING NOTE
"Shaneka Alvarez is a 60 year old female who is being evaluated via a billable video visit.      The patient has been notified of following:     \"This video visit will be conducted via a call between you and your physician/provider. We have found that certain health care needs can be provided without the need for an in-person physical exam.  This service lets us provide the care you need with a video conversation.  If a prescription is necessary we can send it directly to your pharmacy.  If lab work is needed we can place an order for that and you can then stop by our lab to have the test done at a later time.    Video visits are billed at different rates depending on your insurance coverage.  Please reach out to your insurance provider with any questions.    If during the course of the call the physician/provider feels a video visit is not appropriate, you will not be charged for this service.\"    Patient has given verbal consent for Video visit? Yes  How would you like to obtain your AVS? [employer]  Patient would like the video invitation sent by: [employer]  Will anyone else be joining your video visit? No        Video-Visit Details    Type of service:  Video Visit    Originating Location (pt. Location): Other In clinic prior to tx    Distant Location (provider location):  [facility]     Platform used for Video Visit: Imtiaz Thakur CMA        "

## 2020-06-30 NOTE — PROGRESS NOTES
ONCOLOGY FOLLOW UP NOTE: 6/30/2020        I took the history from reviewing the previous notes that she was following with Dr. Amaya.  I have copied and updated from prior notes.    ONCOLOGY History:    1.  January 2006:  Diagnosed with Stage IIB, T2 N1 M0 invasive lobular carcinoma of the right breast.  Final pathology showed a 4.5 x 3.8 x 2.5 cm, 01/14 lymph nodes positive.  Estrogen, progesterone receptor positive, HER-2 negative.     2.  Genetics.  BRCA1 and 2 mutations not detected. 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       THERAPY TO DATE:   1.  2006:  ECOG 2104 protocol of dose-dense Adriamycin, Cytoxan and Avastin x4 cycles followed by Taxol and Avastin x4 cycles.   2.  03/2007:  Completed 1 year Avastin.   3.  11/2006-01/2007:  Radiotherapy to the right breast of 5040 cGy.   4.  03/[id number]:  Aromasin.  Stopped after moving to the [facility].   5.  01/2016:  Right modified radical mastectomy with latissimus dorsi flap reconstruction and a left prophylactic mastectomy with latissimus dorsi flap reconstruction.     She recently had MRI of the brain as a follow-up for multiple sclerosis and there were multiple calvarial lesions noted which was suspicious for metastatic disease.  There was no evidence of new multiple sclerosis lesions.  She had a PET scan on 8/30/2019 which showed widespread bone metastatic lesions (calvarium, spine, sacrum, pelvis, ribs most prominent at C7, T3, L1 and L4), hypermetabolic 3 cm lesion in LLL, and mediastinal/hilar LN. CEA wnl at 1.9 and C27-29 elevated to 415 (28 on 8/23/16). A CT guided biopsy of the right iliac bone was obtained on 9/4/19, pathology consistent with metastatic adenocarcinoma (breast), ER/NC negative, HER-2 negative PDL 1 < 1%. A second biopsy was take of the LLL nodule via EBUS on 9/5/19 did not show any malignancy.    C/T/L spine MRI 8/3/19 to 9/2/19 with numerous enhancing lesions of the C, T and L spine, largest around T9 and L1. No  evidence of cord compression. Has a L1 compression fracture and impending L4.     Received radiation T12-L5 3000 cGy in 10 fractions from 9/6/2019 till 9/18/2019.  Neurosurgery recommended no surgical intervention but to wear [place] brace when out of bed and HOB >30 degrees    She started palliative Xeloda 2000/1500 on 10/1/2019 but after just a few doses she noticed nausea, red eyes blurred vision, loss of appetite.  She stopped taking it after 5 doses and was seen by nurse practitioner on 10/7/2019 when she was improving.  At that point she was restarted on Xeloda at a lower dose of 1000 mg twice a day.  As she was not able to tolerate even the lower dose with extreme nausea and vomiting and feeling extremely fatigued and blurry vision, we decided on stopping Xeloda.    We decided on repeating scans and MRI brain on 10/25/2019 showed no intracranial metastatic disease.   Multiple enhancing calvarial lesions may be increased in number and are suggestive of metastatic disease. Thinner imaging on the current study may identify the smaller lesions and may be responsible for  identified more lesions.   There is a single focus of T2 hyperintense signal within the anterior right temporal lobe may represent interval demyelination. There is no evidence for active demyelination.    PET/CT on 11/1/2019 showed favorable response to therapy and Overall FDG uptake within scattered osseous metastases is decreased since 8/30/2019, particularly within the pelvis. Increased sclerotic appearance of L1 and L4 pathologic compression deformities, likely sequela of recently completed palliative radiation therapy.    No significant FDG uptake in previously demonstrated hypermetabolic mediastinal lymph nodes. Biopsy negative on 9/5/2019.  There is also decreased FDG uptake in the left lower lobe (biopsy negative for  malignancy), now with dense consolidation containing air bronchograms suggestive of infection versus aspiration.  There is  diffuse FDG uptake within the esophagus, consistent with esophagitis.    Because of intolerance to Xeloda we decided on switching to weekly paclitaxel on 11/5/2019.    C#2 Taxol 12/4/19- started with 70mg/m2 dose reduction    C#3 Taxol 12/30/2019     PET/CT on 1/24/2020 showed progression of the disease in some of the bone lesions including increase in size and lytic lesion within the vertebral body of C4 measuring 1 cm as opposed to 0.6 cm previously and a new central soft tissue nodule with SUV max 4.1 when previously it was non-hypermetabolic.  There is also some progression noted in the right proximal femur and right iliac bone.  There is decreased metabolic uptake in the right lamina of T9 with SUV max 4.7 when previously it was 8.0.  Other lesions are stable.    CA 27-29 also had increased significantly and it was 257 on 1/28/2020.    We decided on switching to eribulin on 1/28/2020.    C#2 2/18/2020  C#2 D#8 2/25/2020    C#3 D#1 3/18/2020 -delayed by 1 week as per patient's preference because she wanted to visit her family.    She had a repeat PET/CT on 3/27/2020 which showed stable size of the bone metastatic lesions although the FDG avidity was less, consistent with treatment response.    She had MRI of the thoracic spine on 4/3/2020 and it was compared to the one which was done in August 2019 and it showed increased size of several metastatic lesions most notably at T9 but also at T5-T7.  Other lesions at T10, T11 and T12 appeared improved.    CA 27-29 was also down to 222 on 3/18/2020.    Cycle #4 eribulin ( dose reduced to 1.2 mg/m2 )-4/7/2020-   Cycle #5 Eribulin ( 1.2 mg/m2 )- 4/28/2020   Cycle #6 Eribulin ( 1.2 mg/m2 )- 5/19/2020     Cycle #7 Eribulin ( 1.2 mg/m2 )- 6/9/2020    Cycle #8 Eribulin ( 1.2 mg/m2 )- 6/30/2020     I had also recommended starting Zometa so she got dental clearance to start with that.  She received Zometa on 10/23/2019 and 1/14/2020 and 4/7/2020 on a 3 monthly schedule.       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  It was thought that this could be due to Taxol as patient reported to the ophthalmologist that she was on Taxol in September 2019 when her symptoms started.  But she was not on Taxol in September 2019 when she started noticing the visual changes so Taxol is not responsible for this.  The first dose of Taxol was on 11/5/2019.       Interval history.  This is a video visit.  She tells me that she is tolerating the chemotherapy well.  Denies any significant nausea vomiting diarrhea or constipation.  Headaches are much better now.  Overall pain is better controlled with morphine.  She also tells me that her vision is better because she got procedures on both eyes and she is happy about that.  Overall energy is fair.  Denies fevers or infections or shortness of breath.  Denies any new neuropathy.  No new swellings.        ECOG 1    ROS:  Rest of the comprehensive review of the system was essentially unremarkable.        I reviewed other history in epic as below.       PAST MEDICAL HISTORY:     1.  Breast cancer  2.  Multiple sclerosis.   3.  Depression.   4.  Migraines.   5.  Hypertension.   6.  Cholecystectomy and umbilical hernia repair.     SOCIAL HISTORY: She smoked for 5 years but quit many years ago.  Drinks alcohol socially.  She lives with her .  She is a teacher in middle school.       FAMILY HISTORY: She mentions that her mother had breast cancer at 49.  Both grandmothers had breast cancer but she is not sure of the age.  A couple of cousins have cancers.  Patient has 3 children who are healthy.    Current Outpatient Medications   Medication     acetaminophen (TYLENOL) 500 MG tablet     busPIRone (BUSPAR) 15 MG tablet     calcium citrate-vitamin D (CITRACAL) 315-250 MG-UNIT TABS     Cholecalciferol (VITAMIN D3 PO)     cyclopentolate (CYCLOGYL) 1 % ophthalmic solution     diclofenac (VOLTAREN) 1 % topical gel      "ibuprofen (ADVIL/MOTRIN) 600 MG tablet     LORazepam (ATIVAN) 0.5 MG tablet     magnesium 250 MG tablet     morphine (MS CONTIN) 15 MG CR tablet     morphine (MSIR) 15 MG IR tablet     naloxone (NARCAN) 1 mg/mL for intranasal kit (2 syringes with 2 mucosal atomizer device)     polyethylene glycol (MIRALAX/GLYCOLAX) packet     prochlorperazine (COMPAZINE) 10 MG tablet     propranolol (INDERAL LA) 60 MG 24 hr capsule     senna-docusate (SENOKOT-S/PERICOLACE) 8.6-50 MG tablet     syringe/needle, disp, (B-D INTE[employer]RA SYRINGE) 23G X 1\" 3 ML MISC     traZODone (DESYREL) 50 MG tablet     venlafaxine (EFFEXOR-ER) 225 MG TB24 24 hr tablet     No current facility-administered medications for this visit.         Allergies   Allergen Reactions     Bactrim [Sulfamethoxazole W/Trimethoprim]      Internal bleeding     Copaxone [Glatiramer] Hives          PHYSICAL EXAMINATION:     /81   Pulse 78   Temp 97.6  F (36.4  C)   Wt 58.5 kg (129 lb)   SpO2 96%   BMI 23.59 kg/m    Wt Readings from Last 4 Encounters:   06/30/20 58.5 kg (129 lb)   06/16/20 59.7 kg (131 lb 9.6 oz)   06/09/20 61.7 kg (136 lb)   05/26/20 59 kg (130 lb)             Constitutional.  Does not seem to be in any acute distress.  Eyes.  No redness or discharge noted.  Respiratory.  Speaking in full sentences.  Breathing seems comfortable without any accessory use of muscles.    Skin.  Visualized his skin does not show any obvious rashes.  Musculoskeletal.  Range of motion for visualized areas is intact.  Neurological.  Alert and oriented x3.  Psychiatric.  Mood, mentation and affect are normal.  Decision making capacity is intact.      The rest of a comprehensive physical examination is deferred due to Public Health Emergency video visit restrictions.      Labs and Imaging.    Reviewed      ASSESSMENT AND PLAN:   1.  History of stage IIB, T2 N1 M0 invasive lobular carcinoma of the right breast.  It was initially diagnosed in 2006 and at that time it was " hormone receptor positive, HER-2 negative.  She was treated on ECOG 2104 protocol with dose-dense Adriamycin, Cytoxan and Avastin x4 cycles followed by Taxol and Avastin x4 cycles followed by a Avastin for 1 year.  She also received radiation to the right breast which she completed in January 2007 (5040 cGy).  She took Aromasin from 2007 to 2014.    Now she has metastatic breast cancer with extensive involvement of the bones.  There is also a left lower lobe hypermetabolic lesion and mediastinal lymph nodes which are hypermetabolic.  The biopsy from the right iliac bone is consistent with metastatic lobular breast cancer but it is hormone receptor and HER-2 negative and PDL 1 is also negative.    She has received 3000 cGy of palliative radiation to T12-L5 from 9/6/2019 till 9/18/2019.    She was started on palliative Xeloda but she was unable to tolerate even the dose reduced medication.        We decided on repeating scans and MRI brain on 10/25/2019 showed no intracranial metastatic disease.   Multiple enhancing calvarial lesions may be increased in number and are suggestive of metastatic disease. Thinner imaging on the current study may identify the smaller lesions and may be responsible for identified more lesions.   There is a single focus of T2 hyperintense signal within the anterior right temporal lobe may represent interval demyelination. There is no evidence for active demyelination.    PET/CT on 11/1/2019 showed favorable response to therapy and Overall FDG uptake within scattered osseous metastases is decreased since 8/30/2019, particularly within the pelvis. Increased sclerotic appearance of L1 and L4 pathologic compression deformities, likely sequela of recently completed palliative radiation therapy.    No significant FDG uptake in previously demonstrated hypermetabolic mediastinal lymph nodes. Biopsy negative on 9/5/2019.  There is also decreased FDG uptake in the left lower lobe (biopsy negative for  malignancy), now with dense consolidation containing air bronchograms suggestive of infection versus aspiration.  There is diffuse FDG uptake within the esophagus, consistent with esophagitis.    Because of intolerance to Xeloda we decided on switching to weekly paclitaxel on 11/5/2019.    For better tolerance we decreased the dose of paclitaxel to 70 mg/m  with cycle #2.  She has completed 3 cycles of Taxol and the last chemotherapy was on 1/14/2020.      PET/CT on 1/24/2020 showed progression of the disease in some of the bone lesions including increase in size and lytic lesion within the vertebral body of C4 measuring 1 cm as opposed to 0.6 cm previously and a new central soft tissue nodule with SUV max 4.1 when previously it was non-hypermetabolic.  There is also some progression noted in the right proximal femur and right iliac bone.  There is decreased metabolic uptake in the right lamina of T9 with SUV max 4.7 when previously it was 8.0.  Other lesions are stable.    There are no pathologically enlarged mediastinal, hilar or axillary lymph nodes. Calcified subcarinal lymph nodes. There are no suspicious lung nodules or evidence for infection and previous left lower lobe consolidation has resolved.     CA 27-29 also had increased significantly and it was 257 on 1/28/2020.    Due to progression we switched to eribulin on 1/28/2020.        After 3 cycles, she had a repeat PET/CT on 3/27/2020 which showed a stable size of the bone metastatic lesions although the FDG avidity was less, consistent with treatment response.    She had MRI of the thoracic spine on 4/3/2020 and it was compared to the one which was done in August 2019 and it showed increased size of several metastatic lesions most notably at T9 but also at T5-T7.  Other lesions at T10, T11 and T12 appeared improved.    CA 27-29 was also down to 222 on 3/18/2020.    I believe overall this is consistent with a partial response to the treatment.  Overall  she is tolerating eribulin well with some worsening of neuropathy and cytopenias.    We discussed the situation in detail.  We decided on continuing the chemotherapy with some modifications and she got cycle #4 with slightly decreased dose of eribulin to 1.2 mg/m2.      She continues to tolerate chemotherapy well and she will start cycle #8 today and we will repeat PET scan after this cycle.  CA 27-29 was better last time.  It is pending from today.        Recently FDA approved sacituzumab govitecan-hziy (Trodelvy) for TNBC, based on the results of the phase II IMMU-132-01 clinical trial. So this might become an option for her in the future.     Nausea.  Continue as needed antinausea medications.        Neuropathy.  Neuropathy was getting worse so we decreased the dose of eribulin and now it has stabilized and she denies any worsening.  From the underlying multiple sclerosis she has chronic balance issues and heat and cold sensitivity.        Pain management.  This is under decent control with morphine and she is following with palliative care.      Previously she got palliative radiation to T12-L5 3000 cGy in 10 fractions from 9/6/2019 till 9/18/2019.  She was evaluated by orthopedics Dr. Bipin Elliott on 11/1/2019 and conservative management was recommended.      Bone metastasis.  She received Zometa on 10/23/2019 and 01/14/2020 and 4/7/2020 and she will get it today 6/30/2020.  She is getting it every 3 months.  Continue calcium and vitamin D.       Vision changes.    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  It was thought that this could be due to Taxol as patient reported to the ophthalmologist that she was on Taxol in September 2019 when her symptoms started.  But she was not on Taxol in September 2019 when she started noticing the visual changes so Taxol is not responsible for this.  The first dose of Taxol was on 11/5/2019.   Her vision is much better now.  She  will continue to follow closely with ophthalmology.       We did not address the following today.  Insomnia.  Continue trazodone.      Increased FDG ability in the colon.  She had FDG uptake in the cecum and ascending colon but no corresponding lesion was seen on the CT scan.  She is completely asymptomatic so at this time we will continue to observe.    Fatigue.  This is from the cancer and chemotherapy.  I again encouraged her to do regular exercise.  Continue to follow with cancer rehab.        Discussion regarding healthcare directives.  On previous visit she told me that she will bring the health care directive for our records but she forgot so I told her to bring it on next visit.      Breast implant removal.  She tells me that she was planning to do breast implant removal because there was a recall on this.  Her surgery was scheduled for 9/24/2019.  Because of this new development of metastatic breast cancer and her recent radiation, surgery has been canceled.  We discussed that at this time treatment for metastatic breast cancer would take precedence over the other surgery as the other surgery would require her to be off chemotherapy for several weeks and in that case the chances of cancer progression would be high and I would not recommend that.    Multiple sclerosis.  She will continue to follow with her neurologist Dr. Quiles.  Currently multiple sclerosis is under control and currently she is not taking any medications.    RTC in 3 weeks    All questions answered.  She is agreeable and comfortable with the plan.        Dewayne Zepeda MD    Video start time. 1:15 PM  Video stop time. 1:22 PM

## 2020-06-30 NOTE — LETTER
6/30/2020         RE: Shaneka Alvarez  [address] [facility] [address]        Dear Colleague,    Thank you for referring your patient, Shaneka Alvarez, to the [facility]. Please see a copy of my visit note below.      ONCOLOGY FOLLOW UP NOTE: 6/30/2020        I took the history from reviewing the previous notes that she was following with Dr. Amaya.  I have copied and updated from prior notes.    ONCOLOGY History:    1.  January 2006:  Diagnosed with Stage IIB, T2 N1 M0 invasive lobular carcinoma of the right breast.  Final pathology showed a 4.5 x 3.8 x 2.5 cm, 01/14 lymph nodes positive.  Estrogen, progesterone receptor positive, HER-2 negative.     2.  Genetics.  BRCA1 and 2 mutations not detected. 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       THERAPY TO DATE:   1. 2006:  ECOG 2104 protocol of dose-dense Adriamycin, Cytoxan and Avastin x4 cycles followed by Taxol and Avastin x4 cycles.   2.  03/2007:  Completed 1 year Avastin.   3.  11/2006-01/2007:  Radiotherapy to the right breast of 5040 cGy.   4.  03/[id number]:  Aromasin.  Stopped after moving to the [facility].   5.  01/2016:  Right modified radical mastectomy with latissimus dorsi flap reconstruction and a left prophylactic mastectomy with latissimus dorsi flap reconstruction.     She recently had MRI of the brain as a follow-up for multiple sclerosis and there were multiple calvarial lesions noted which was suspicious for metastatic disease.  There was no evidence of new multiple sclerosis lesions.  She had a PET scan on 8/30/2019 which showed widespread bone metastatic lesions (calvarium, spine, sacrum, pelvis, ribs most prominent at C7, T3, L1 and L4), hypermetabolic 3 cm lesion in LLL, and mediastinal/hilar LN. CEA wnl at 1.9 and C27-29 elevated to 415 (28 on 8/23/16). A CT guided biopsy of the right iliac bone was obtained on 9/4/19, pathology consistent with metastatic adenocarcinoma (breast), ER/DE negative,  HER-2 negative PDL 1 < 1%. A second biopsy was take of the LLL nodule via EBUS on 9/5/19 did not show any malignancy.    C/T/L spine MRI 8/3/19 to 9/2/19 with numerous enhancing lesions of the C, T and L spine, largest around T9 and L1. No evidence of cord compression. Has a L1 compression fracture and impending L4.     Received radiation T12-L5 3000 cGy in 10 fractions from 9/6/2019 till 9/18/2019.  Neurosurgery recommended no surgical intervention but to wear [place] brace when out of bed and HOB >30 degrees    She started palliative Xeloda 2000/1500 on 10/1/2019 but after just a few doses she noticed nausea, red eyes blurred vision, loss of appetite.  She stopped taking it after 5 doses and was seen by nurse practitioner on 10/7/2019 when she was improving.  At that point she was restarted on Xeloda at a lower dose of 1000 mg twice a day.  As she was not able to tolerate even the lower dose with extreme nausea and vomiting and feeling extremely fatigued and blurry vision, we decided on stopping Xeloda.    We decided on repeating scans and MRI brain on 10/25/2019 showed no intracranial metastatic disease.   Multiple enhancing calvarial lesions may be increased in number and are suggestive of metastatic disease. Thinner imaging on the current study may identify the smaller lesions and may be responsible for  identified more lesions.   There is a single focus of T2 hyperintense signal within the anterior right temporal lobe may represent interval demyelination. There is no evidence for active demyelination.    PET/CT on 11/1/2019 showed favorable response to therapy and Overall FDG uptake within scattered osseous metastases is decreased since 8/30/2019, particularly within the pelvis. Increased sclerotic appearance of L1 and L4 pathologic compression deformities, likely sequela of recently completed palliative radiation therapy.    No significant FDG uptake in previously demonstrated hypermetabolic mediastinal lymph  nodes. Biopsy negative on 9/5/2019.  There is also decreased FDG uptake in the left lower lobe (biopsy negative for  malignancy), now with dense consolidation containing air bronchograms suggestive of infection versus aspiration.  There is diffuse FDG uptake within the esophagus, consistent with esophagitis.    Because of intolerance to Xeloda we decided on switching to weekly paclitaxel on 11/5/2019.    C#2 Taxol 12/4/19- started with 70mg/m2 dose reduction    C#3 Taxol 12/30/2019     PET/CT on 1/24/2020 showed progression of the disease in some of the bone lesions including increase in size and lytic lesion within the vertebral body of C4 measuring 1 cm as opposed to 0.6 cm previously and a new central soft tissue nodule with SUV max 4.1 when previously it was non-hypermetabolic.  There is also some progression noted in the right proximal femur and right iliac bone.  There is decreased metabolic uptake in the right lamina of T9 with SUV max 4.7 when previously it was 8.0.  Other lesions are stable.    CA 27-29 also had increased significantly and it was 257 on 1/28/2020.    We decided on switching to eribulin on 1/28/2020.    C#2 2/18/2020  C#2 D#8 2/25/2020    C#3 D#1 3/18/2020 -delayed by 1 week as per patient's preference because she wanted to visit her family.    She had a repeat PET/CT on 3/27/2020 which showed stable size of the bone metastatic lesions although the FDG avidity was less, consistent with treatment response.    She had MRI of the thoracic spine on 4/3/2020 and it was compared to the one which was done in August 2019 and it showed increased size of several metastatic lesions most notably at T9 but also at T5-T7.  Other lesions at T10, T11 and T12 appeared improved.    CA 27-29 was also down to 222 on 3/18/2020.    Cycle #4 eribulin ( dose reduced to 1.2 mg/m2 )-4/7/2020-   Cycle #5 Eribulin ( 1.2 mg/m2 )- 4/28/2020   Cycle #6 Eribulin ( 1.2 mg/m2 )- 5/19/2020     Cycle #7 Eribulin ( 1.2 mg/m2 )-  6/9/2020    Cycle #8 Eribulin ( 1.2 mg/m2 )- 6/30/2020     I had also recommended starting Zometa so she got dental clearance to start with that.  She received Zometa on 10/23/2019 and 1/14/2020 and 4/7/2020 on a 3 monthly schedule.      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  It was thought that this could be due to Taxol as patient reported to the ophthalmologist that she was on Taxol in September 2019 when her symptoms started.  But she was not on Taxol in September 2019 when she started noticing the visual changes so Taxol is not responsible for this.  The first dose of Taxol was on 11/5/2019.       Interval history.  This is a video visit.  She tells me that she is tolerating the chemotherapy well.  Denies any significant nausea vomiting diarrhea or constipation.  Headaches are much better now.  Overall pain is better controlled with morphine.  She also tells me that her vision is better because she got procedures on both eyes and she is happy about that.  Overall energy is fair.  Denies fevers or infections or shortness of breath.  Denies any new neuropathy.  No new swellings.        ECOG 1    ROS:  Rest of the comprehensive review of the system was essentially unremarkable.        I reviewed other history in epic as below.       PAST MEDICAL HISTORY:     1.  Breast cancer  2.  Multiple sclerosis.   3.  Depression.   4.  Migraines.   5.  Hypertension.   6.  Cholecystectomy and umbilical hernia repair.     SOCIAL HISTORY: She smoked for 5 years but quit many years ago.  Drinks alcohol socially.  She lives with her .  She is a teacher in middle school.       FAMILY HISTORY: She mentions that her mother had breast cancer at 49.  Both grandmothers had breast cancer but she is not sure of the age.  A couple of cousins have cancers.  Patient has 3 children who are healthy.    Current Outpatient Medications   Medication     acetaminophen (TYLENOL) 500 MG tablet      "busPIRone (BUSPAR) 15 MG tablet     calcium citrate-vitamin D (CITRACAL) 315-250 MG-UNIT TABS     Cholecalciferol (VITAMIN D3 PO)     cyclopentolate (CYCLOGYL) 1 % ophthalmic solution     diclofenac (VOLTAREN) 1 % topical gel     ibuprofen (ADVIL/MOTRIN) 600 MG tablet     LORazepam (ATIVAN) 0.5 MG tablet     magnesium 250 MG tablet     morphine (MS CONTIN) 15 MG CR tablet     morphine (MSIR) 15 MG IR tablet     naloxone (NARCAN) 1 mg/mL for intranasal kit (2 syringes with 2 mucosal atomizer device)     polyethylene glycol (MIRALAX/GLYCOLAX) packet     prochlorperazine (COMPAZINE) 10 MG tablet     propranolol (INDERAL LA) 60 MG 24 hr capsule     senna-docusate (SENOKOT-S/PERICOLACE) 8.6-50 MG tablet     syringe/needle, disp, (B-D INTE[employer]RA SYRINGE) 23G X 1\" 3 ML MISC     traZODone (DESYREL) 50 MG tablet     venlafaxine (EFFEXOR-ER) 225 MG TB24 24 hr tablet     No current facility-administered medications for this visit.         Allergies   Allergen Reactions     Bactrim [Sulfamethoxazole W/Trimethoprim]      Internal bleeding     Copaxone [Glatiramer] Hives          PHYSICAL EXAMINATION:     /81   Pulse 78   Temp 97.6  F (36.4  C)   Wt 58.5 kg (129 lb)   SpO2 96%   BMI 23.59 kg/m    Wt Readings from Last 4 Encounters:   06/30/20 58.5 kg (129 lb)   06/16/20 59.7 kg (131 lb 9.6 oz)   06/09/20 61.7 kg (136 lb)   05/26/20 59 kg (130 lb)             Constitutional.  Does not seem to be in any acute distress.  Eyes.  No redness or discharge noted.  Respiratory.  Speaking in full sentences.  Breathing seems comfortable without any accessory use of muscles.    Skin.  Visualized his skin does not show any obvious rashes.  Musculoskeletal.  Range of motion for visualized areas is intact.  Neurological.  Alert and oriented x3.  Psychiatric.  Mood, mentation and affect are normal.  Decision making capacity is intact.      The rest of a comprehensive physical examination is deferred due to Public Health Emergency video " visit restrictions.      Labs and Imaging.    Reviewed      ASSESSMENT AND PLAN:   1.  History of stage IIB, T2 N1 M0 invasive lobular carcinoma of the right breast.  It was initially diagnosed in 2006 and at that time it was hormone receptor positive, HER-2 negative.  She was treated on ECOG 2104 protocol with dose-dense Adriamycin, Cytoxan and Avastin x4 cycles followed by Taxol and Avastin x4 cycles followed by a Avastin for 1 year.  She also received radiation to the right breast which she completed in January 2007 (5040 cGy).  She took Aromasin from 2007 to 2014.    Now she has metastatic breast cancer with extensive involvement of the bones.  There is also a left lower lobe hypermetabolic lesion and mediastinal lymph nodes which are hypermetabolic.  The biopsy from the right iliac bone is consistent with metastatic lobular breast cancer but it is hormone receptor and HER-2 negative and PDL 1 is also negative.    She has received 3000 cGy of palliative radiation to T12-L5 from 9/6/2019 till 9/18/2019.    She was started on palliative Xeloda but she was unable to tolerate even the dose reduced medication.        We decided on repeating scans and MRI brain on 10/25/2019 showed no intracranial metastatic disease.   Multiple enhancing calvarial lesions may be increased in number and are suggestive of metastatic disease. Thinner imaging on the current study may identify the smaller lesions and may be responsible for identified more lesions.   There is a single focus of T2 hyperintense signal within the anterior right temporal lobe may represent interval demyelination. There is no evidence for active demyelination.    PET/CT on 11/1/2019 showed favorable response to therapy and Overall FDG uptake within scattered osseous metastases is decreased since 8/30/2019, particularly within the pelvis. Increased sclerotic appearance of L1 and L4 pathologic compression deformities, likely sequela of recently completed  palliative radiation therapy.    No significant FDG uptake in previously demonstrated hypermetabolic mediastinal lymph nodes. Biopsy negative on 9/5/2019.  There is also decreased FDG uptake in the left lower lobe (biopsy negative for malignancy), now with dense consolidation containing air bronchograms suggestive of infection versus aspiration.  There is diffuse FDG uptake within the esophagus, consistent with esophagitis.    Because of intolerance to Xeloda we decided on switching to weekly paclitaxel on 11/5/2019.    For better tolerance we decreased the dose of paclitaxel to 70 mg/m  with cycle #2.  She has completed 3 cycles of Taxol and the last chemotherapy was on 1/14/2020.      PET/CT on 1/24/2020 showed progression of the disease in some of the bone lesions including increase in size and lytic lesion within the vertebral body of C4 measuring 1 cm as opposed to 0.6 cm previously and a new central soft tissue nodule with SUV max 4.1 when previously it was non-hypermetabolic.  There is also some progression noted in the right proximal femur and right iliac bone.  There is decreased metabolic uptake in the right lamina of T9 with SUV max 4.7 when previously it was 8.0.  Other lesions are stable.    There are no pathologically enlarged mediastinal, hilar or axillary lymph nodes. Calcified subcarinal lymph nodes. There are no suspicious lung nodules or evidence for infection and previous left lower lobe consolidation has resolved.     CA 27-29 also had increased significantly and it was 257 on 1/28/2020.    Due to progression we switched to eribulin on 1/28/2020.        After 3 cycles, she had a repeat PET/CT on 3/27/2020 which showed a stable size of the bone metastatic lesions although the FDG avidity was less, consistent with treatment response.    She had MRI of the thoracic spine on 4/3/2020 and it was compared to the one which was done in August 2019 and it showed increased size of several metastatic  lesions most notably at T9 but also at T5-T7.  Other lesions at T10, T11 and T12 appeared improved.    CA 27-29 was also down to 222 on 3/18/2020.    I believe overall this is consistent with a partial response to the treatment.  Overall she is tolerating eribulin well with some worsening of neuropathy and cytopenias.    We discussed the situation in detail.  We decided on continuing the chemotherapy with some modifications and she got cycle #4 with slightly decreased dose of eribulin to 1.2 mg/m2.      She continues to tolerate chemotherapy well and she will start cycle #8 today and we will repeat PET scan after this cycle.  CA 27-29 was better last time.  It is pending from today.        Recently FDA approved sacituzumab govitecan-hziy (Trodelvy) for TNBC, based on the results of the phase II IMMU-132-01 clinical trial. So this might become an option for her in the future.     Nausea.  Continue as needed antinausea medications.        Neuropathy.  Neuropathy was getting worse so we decreased the dose of eribulin and now it has stabilized and she denies any worsening.  From the underlying multiple sclerosis she has chronic balance issues and heat and cold sensitivity.        Pain management.  This is under decent control with morphine and she is following with palliative care.      Previously she got palliative radiation to T12-L5 3000 cGy in 10 fractions from 9/6/2019 till 9/18/2019.  She was evaluated by orthopedics Dr. Bipin Elliott on 11/1/2019 and conservative management was recommended.      Bone metastasis.  She received Zometa on 10/23/2019 and 01/14/2020 and 4/7/2020 and she will get it today 6/30/2020.  She is getting it every 3 months.  Continue calcium and vitamin D.       Vision changes.    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  It was thought that this could be due to Taxol as patient reported to the ophthalmologist that she was on Taxol in September 2019  when her symptoms started.  But she was not on Taxol in September 2019 when she started noticing the visual changes so Taxol is not responsible for this.  The first dose of Taxol was on 11/5/2019.   Her vision is much better now.  She will continue to follow closely with ophthalmology.       We did not address the following today.  Insomnia.  Continue trazodone.      Increased FDG ability in the colon.  She had FDG uptake in the cecum and ascending colon but no corresponding lesion was seen on the CT scan.  She is completely asymptomatic so at this time we will continue to observe.    Fatigue.  This is from the cancer and chemotherapy.  I again encouraged her to do regular exercise.  Continue to follow with cancer rehab.        Discussion regarding healthcare directives.  On previous visit she told me that she will bring the health care directive for our records but she forgot so I told her to bring it on next visit.      Breast implant removal.  She tells me that she was planning to do breast implant removal because there was a recall on this.  Her surgery was scheduled for 9/24/2019.  Because of this new development of metastatic breast cancer and her recent radiation, surgery has been canceled.  We discussed that at this time treatment for metastatic breast cancer would take precedence over the other surgery as the other surgery would require her to be off chemotherapy for several weeks and in that case the chances of cancer progression would be high and I would not recommend that.    Multiple sclerosis.  She will continue to follow with her neurologist Dr. Quiles.  Currently multiple sclerosis is under control and currently she is not taking any medications.    RTC in 3 weeks    All questions answered.  She is agreeable and comfortable with the plan.        Dewayne Zepeda MD    Video start time. 1:15 PM  Video stop time. 1:22 PM      Again, thank you for allowing me to participate in the care of your patient.         Sincerely,        Dewayne Zepeda MD

## 2020-07-02 DIAGNOSIS — G89.3 CANCER ASSOCIATED PAIN: ICD-10-CM

## 2020-07-02 RX ORDER — MORPHINE SULFATE 15 MG/1
15-30 TABLET ORAL
Qty: 180 TABLET | Refills: 0 | Status: SHIPPED | OUTPATIENT
Start: 2020-07-02 | End: 2020-10-06

## 2020-07-02 RX ORDER — MORPHINE SULFATE 15 MG/1
15 TABLET, FILM COATED, EXTENDED RELEASE ORAL EVERY EVENING
Qty: 30 TABLET | Refills: 0 | Status: SHIPPED | OUTPATIENT
Start: 2020-07-02 | End: 2020-08-11

## 2020-07-02 NOTE — TELEPHONE ENCOUNTER
----- Message from Xochitl Olivares sent at 7/1/2020  9:35 AM CDT -----  Hi Shaneka Pedraza needs refills on Morphine (long acting and quick acting). She needs them both refilled. She can be contacted at [phone].     Xochitl

## 2020-07-02 NOTE — TELEPHONE ENCOUNTER
Received message patient requesting refills of morphine ER and IR.     Last refill MS Contin: 6/5/2020  Last refill MSIR: 3/26/2020  Last office visit: 5/13/2020       Will route request to MD for review.     Reviewed [place]  Report.

## 2020-07-07 ENCOUNTER — INFUSION THERAPY VISIT (OUTPATIENT)
Dept: INFUSION THERAPY | Facility: CLINIC | Age: 60
End: 2020-07-07
Payer: COMMERCIAL

## 2020-07-07 ENCOUNTER — ONCOLOGY VISIT (OUTPATIENT)
Dept: ONCOLOGY | Facility: CLINIC | Age: 60
End: 2020-07-07
Payer: COMMERCIAL

## 2020-07-07 VITALS
TEMPERATURE: 98 F | RESPIRATION RATE: 16 BRPM | DIASTOLIC BLOOD PRESSURE: 71 MMHG | HEART RATE: 71 BPM | OXYGEN SATURATION: 98 % | BODY MASS INDEX: 23.78 KG/M2 | SYSTOLIC BLOOD PRESSURE: 103 MMHG | WEIGHT: 130 LBS

## 2020-07-07 DIAGNOSIS — C50.919 METASTATIC BREAST CANCER: Primary | ICD-10-CM

## 2020-07-07 DIAGNOSIS — C50.912 BILATERAL MALIGNANT NEOPLASM OF BREAST IN FEMALE, UNSPECIFIED ESTROGEN RECEPTOR STATUS, UNSPECIFIED SITE OF BREAST (H): ICD-10-CM

## 2020-07-07 DIAGNOSIS — C50.911 BILATERAL MALIGNANT NEOPLASM OF BREAST IN FEMALE, UNSPECIFIED ESTROGEN RECEPTOR STATUS, UNSPECIFIED SITE OF BREAST (H): ICD-10-CM

## 2020-07-07 LAB
ALBUMIN SERPL-MCNC: 3.4 G/DL (ref 3.4–5)
ALP SERPL-CCNC: 85 U/L (ref 40–150)
ALT SERPL W P-5'-P-CCNC: 30 U/L (ref 0–50)
ANION GAP SERPL CALCULATED.3IONS-SCNC: 7 MMOL/L (ref 3–14)
AST SERPL W P-5'-P-CCNC: 19 U/L (ref 0–45)
BASOPHILS # BLD AUTO: 0.1 10E9/L (ref 0–0.2)
BASOPHILS NFR BLD AUTO: 1.4 %
BILIRUB SERPL-MCNC: 0.3 MG/DL (ref 0.2–1.3)
BUN SERPL-MCNC: 10 MG/DL (ref 7–30)
CALCIUM SERPL-MCNC: 8.6 MG/DL (ref 8.5–10.1)
CHLORIDE SERPL-SCNC: 100 MMOL/L (ref 94–109)
CO2 SERPL-SCNC: 30 MMOL/L (ref 20–32)
CREAT SERPL-MCNC: 0.66 MG/DL (ref 0.52–1.04)
DIFFERENTIAL METHOD BLD: ABNORMAL
EOSINOPHIL # BLD AUTO: 0 10E9/L (ref 0–0.7)
EOSINOPHIL NFR BLD AUTO: 0.2 %
ERYTHROCYTE [DISTWIDTH] IN BLOOD BY AUTOMATED COUNT: 14.8 % (ref 10–15)
GFR SERPL CREATININE-BSD FRML MDRD: >90 ML/MIN/{1.73_M2}
GLUCOSE SERPL-MCNC: 125 MG/DL (ref 70–99)
HCT VFR BLD AUTO: 35.2 % (ref 35–47)
HGB BLD-MCNC: 11.6 G/DL (ref 11.7–15.7)
IMM GRANULOCYTES # BLD: 0.1 10E9/L (ref 0–0.4)
IMM GRANULOCYTES NFR BLD: 1.6 %
LYMPHOCYTES # BLD AUTO: 0.8 10E9/L (ref 0.8–5.3)
LYMPHOCYTES NFR BLD AUTO: 17.3 %
MAGNESIUM SERPL-MCNC: 2.1 MG/DL (ref 1.6–2.3)
MCH RBC QN AUTO: 28.3 PG (ref 26.5–33)
MCHC RBC AUTO-ENTMCNC: 33 G/DL (ref 31.5–36.5)
MCV RBC AUTO: 86 FL (ref 78–100)
MONOCYTES # BLD AUTO: 0.2 10E9/L (ref 0–1.3)
MONOCYTES NFR BLD AUTO: 5.5 %
NEUTROPHILS # BLD AUTO: 3.3 10E9/L (ref 1.6–8.3)
NEUTROPHILS NFR BLD AUTO: 74 %
PLATELET # BLD AUTO: 188 10E9/L (ref 150–450)
POTASSIUM SERPL-SCNC: 4 MMOL/L (ref 3.4–5.3)
PROT SERPL-MCNC: 6.2 G/DL (ref 6.8–8.8)
RBC # BLD AUTO: 4.1 10E12/L (ref 3.8–5.2)
SODIUM SERPL-SCNC: 137 MMOL/L (ref 133–144)
WBC # BLD AUTO: 4.4 10E9/L (ref 4–11)

## 2020-07-07 PROCEDURE — 96367 TX/PROPH/DG ADDL SEQ IV INF: CPT | Performed by: INTERNAL MEDICINE

## 2020-07-07 PROCEDURE — 80053 COMPREHEN METABOLIC PANEL: CPT | Performed by: INTERNAL MEDICINE

## 2020-07-07 PROCEDURE — 96409 CHEMO IV PUSH SNGL DRUG: CPT | Performed by: INTERNAL MEDICINE

## 2020-07-07 PROCEDURE — 99207 ZZC NO CHARGE LOS: CPT

## 2020-07-07 PROCEDURE — 99207 ZZC NO CHARGE NURSE ONLY: CPT

## 2020-07-07 PROCEDURE — 85025 COMPLETE CBC W/AUTO DIFF WBC: CPT | Performed by: INTERNAL MEDICINE

## 2020-07-07 PROCEDURE — 83735 ASSAY OF MAGNESIUM: CPT | Performed by: INTERNAL MEDICINE

## 2020-07-07 RX ORDER — HEPARIN SODIUM (PORCINE) LOCK FLUSH IV SOLN 100 UNIT/ML 100 UNIT/ML
5 SOLUTION INTRAVENOUS
Status: DISCONTINUED | OUTPATIENT
Start: 2020-07-07 | End: 2020-07-07 | Stop reason: HOSPADM

## 2020-07-07 RX ADMIN — HEPARIN SODIUM (PORCINE) LOCK FLUSH IV SOLN 100 UNIT/ML 5 ML: 100 SOLUTION at 07:47

## 2020-07-07 RX ADMIN — HEPARIN SODIUM (PORCINE) LOCK FLUSH IV SOLN 100 UNIT/ML 5 ML: 100 SOLUTION at 09:09

## 2020-07-07 RX ADMIN — Medication 250 ML: at 08:15

## 2020-07-07 ASSESSMENT — PAIN SCALES - GENERAL: PAINLEVEL: NO PAIN (0)

## 2020-07-07 NOTE — PROGRESS NOTES
Infusion Nursing Note:  Shaneka Alvarez presents today for C8D8 Eribulin.    Patient seen by provider today: No   present during visit today: Not Applicable.    Note: N/A.    Intravenous Access:  Implanted Port.    Treatment Conditions:  Lab Results   Component Value Date    HGB 11.6 07/07/2020     Lab Results   Component Value Date    WBC 4.4 07/07/2020      Lab Results   Component Value Date    ANEU 3.3 07/07/2020     Lab Results   Component Value Date     07/07/2020      Lab Results   Component Value Date     07/07/2020                   Lab Results   Component Value Date    POTASSIUM 4.0 07/07/2020           Lab Results   Component Value Date    MAG 2.1 07/07/2020            Lab Results   Component Value Date    CR 0.66 07/07/2020                   Lab Results   Component Value Date    RAFAELA 8.6 07/07/2020                Lab Results   Component Value Date    BILITOTAL 0.3 07/07/2020           Lab Results   Component Value Date    ALBUMIN 3.4 07/07/2020                    Lab Results   Component Value Date    ALT 30 07/07/2020           Lab Results   Component Value Date    AST 19 07/07/2020       Post Infusion Assessment:  Patient tolerated infusion without incident.  Site patent and intact, free from redness, edema or discomfort.  No evidence of extravasations.  Access discontinued per protocol.       Discharge Plan:   Patient will return 7/21 for next appointment.   Patient discharged in stable condition accompanied by: self.  Departure Mode: Ambulatory.    Cristina Laoiza RN

## 2020-07-07 NOTE — PROGRESS NOTES
"Patient's name and  were verified.  See Doc Flowsheet - IV assess for details.  IVAD accessed with 20G 1\" mcnair gripper plus needle  blood return positive: YES  Site without redness, tenderness or swelling: YES  flushed with 20cc NS and 5cc 100u/ml heparin  Needle: left accessed for chemotherapy  Comments: Labs drawn.  Patient tolerated procedure without incident.    Javier Roland  RN, BSN      "

## 2020-07-13 ENCOUNTER — PATIENT OUTREACH (OUTPATIENT)
Dept: ONCOLOGY | Facility: CLINIC | Age: 60
End: 2020-07-13

## 2020-07-13 NOTE — PROGRESS NOTES
Received call from patient stating she hasn't been able to eat since Saturday due to vomiting. Patient anxious and near tears. She vomited up the Ativan. Instructed patient to try crushing the ativan and putting it under her tongue and to sip on some regular pop. Will follow up with patient in 1-2 hours.

## 2020-07-13 NOTE — PROGRESS NOTES
Call placed to patient about 2 hours later. Patient states she was able to sleep for a while. She is sipping the pop. States she feels much better. Instructed patient to take the compazine every 6 hours for the next 1 to 2 days. Patient has some nutritional supplement drinks that she will try. Patient should try eating small amounts food like toast/soup and increase diet as tolerated. Patient will call back if the vomiting continues.

## 2020-07-17 ENCOUNTER — ANCILLARY PROCEDURE (OUTPATIENT)
Dept: PET IMAGING | Facility: CLINIC | Age: 60
End: 2020-07-17
Attending: INTERNAL MEDICINE
Payer: COMMERCIAL

## 2020-07-17 ENCOUNTER — HOSPITAL ENCOUNTER (EMERGENCY)
Facility: CLINIC | Age: 60
Discharge: HOME | End: 2020-07-17
Attending: EMERGENCY MEDICINE | Admitting: EMERGENCY MEDICINE
Payer: COMMERCIAL

## 2020-07-17 VITALS
HEART RATE: 96 BPM | SYSTOLIC BLOOD PRESSURE: 112 MMHG | RESPIRATION RATE: 17 BRPM | BODY MASS INDEX: 24.14 KG/M2 | TEMPERATURE: 97.8 F | DIASTOLIC BLOOD PRESSURE: 85 MMHG | OXYGEN SATURATION: 100 % | WEIGHT: 132 LBS

## 2020-07-17 DIAGNOSIS — C79.51 BONE METASTASES: ICD-10-CM

## 2020-07-17 DIAGNOSIS — C50.919 METASTATIC BREAST CANCER: ICD-10-CM

## 2020-07-17 DIAGNOSIS — I26.99 ACUTE PULMONARY EMBOLISM WITHOUT ACUTE COR PULMONALE, UNSPECIFIED PULMONARY EMBOLISM TYPE (H): ICD-10-CM

## 2020-07-17 LAB
BASOPHILS # BLD AUTO: 0 10E9/L (ref 0–0.2)
BASOPHILS NFR BLD AUTO: 0 %
DIFFERENTIAL METHOD BLD: ABNORMAL
EOSINOPHIL # BLD AUTO: 0 10E9/L (ref 0–0.7)
EOSINOPHIL NFR BLD AUTO: 0 %
ERYTHROCYTE [DISTWIDTH] IN BLOOD BY AUTOMATED COUNT: 16.5 % (ref 10–15)
HCT VFR BLD AUTO: 40.1 % (ref 35–47)
HGB BLD-MCNC: 13.2 G/DL (ref 11.7–15.7)
LYMPHOCYTES # BLD AUTO: 0.7 10E9/L (ref 0.8–5.3)
LYMPHOCYTES NFR BLD AUTO: 19 %
MCH RBC QN AUTO: 28.6 PG (ref 26.5–33)
MCHC RBC AUTO-ENTMCNC: 32.9 G/DL (ref 31.5–36.5)
MCV RBC AUTO: 87 FL (ref 78–100)
MONOCYTES # BLD AUTO: 0.9 10E9/L (ref 0–1.3)
MONOCYTES NFR BLD AUTO: 26 %
NEUTROPHILS # BLD AUTO: 1.9 10E9/L (ref 1.6–8.3)
NEUTROPHILS NFR BLD AUTO: 55 %
PLATELET # BLD AUTO: 208 10E9/L (ref 150–450)
RBC # BLD AUTO: 4.62 10E12/L (ref 3.8–5.2)
WBC # BLD AUTO: 3.5 10E9/L (ref 4–11)

## 2020-07-17 PROCEDURE — 99285 EMERGENCY DEPT VISIT HI MDM: CPT | Mod: Z6 | Performed by: EMERGENCY MEDICINE

## 2020-07-17 PROCEDURE — 25000128 H RX IP 250 OP 636

## 2020-07-17 PROCEDURE — 71260 CT THORAX DX C+: CPT | Mod: 59 | Performed by: RADIOLOGY

## 2020-07-17 PROCEDURE — 36415 COLL VENOUS BLD VENIPUNCTURE: CPT | Performed by: EMERGENCY MEDICINE

## 2020-07-17 PROCEDURE — 78816 PET IMAGE W/CT FULL BODY: CPT | Mod: PS | Performed by: RADIOLOGY

## 2020-07-17 PROCEDURE — 85025 COMPLETE CBC W/AUTO DIFF WBC: CPT | Performed by: EMERGENCY MEDICINE

## 2020-07-17 PROCEDURE — 99283 EMERGENCY DEPT VISIT LOW MDM: CPT | Performed by: EMERGENCY MEDICINE

## 2020-07-17 PROCEDURE — A9552 F18 FDG: HCPCS | Performed by: RADIOLOGY

## 2020-07-17 PROCEDURE — 74177 CT ABD & PELVIS W/CONTRAST: CPT | Mod: 59 | Performed by: RADIOLOGY

## 2020-07-17 RX ORDER — IOPAMIDOL 755 MG/ML
50-135 INJECTION, SOLUTION INTRAVASCULAR ONCE
Status: COMPLETED | OUTPATIENT
Start: 2020-07-17 | End: 2020-07-17

## 2020-07-17 RX ADMIN — IOPAMIDOL 80 ML: 755 INJECTION, SOLUTION INTRAVASCULAR at 14:18

## 2020-07-17 NOTE — ED TRIAGE NOTES
Presents to ED after being notified of positive PE study. Denies any chest pain or shortness of breath. Has a port but it is NOT a power port.

## 2020-07-17 NOTE — ED AVS SNAPSHOT
[facility]  [address] [place] [address]  Phone:  [phone]  Fax:  [phone]                                    Shaneka Alvarez   MRN: [id number]    Department:  [facility] Emergency Department   Date of Visit:  7/17/2020           After Visit Summary Signature Page    I have received my discharge instructions, and my questions have been answered. I have discussed any challenges I see with this plan with the nurse or doctor.    ..........................................................................................................................................  Patient/Patient Representative Signature      ..........................................................................................................................................  Patient Representative Print Name and Relationship to Patient    ..................................................               ................................................  Date                                   Time    ..........................................................................................................................................  Reviewed by Signature/Title    ...................................................              ..............................................  Date                                               Time          22EPIC Rev 08/18

## 2020-07-18 LAB — RADIOLOGIST FLAGS: ABNORMAL

## 2020-07-18 NOTE — ED NOTES
Patient education done on Lovenox, including side effect, when to return to the ED and she demonstrated giving herself an injection.

## 2020-07-18 NOTE — DISCHARGE INSTRUCTIONS
Based on your PET scan today you had blood clots in your lungs, called pulmonary embolism    The Lovenox is a blood thinner that will help to dissolve these blood clots slowly and safely    You will need a shot twice a day.  A prescription was filled for the next 2 weeks, but you may need more medication.  You should follow closely with your oncologist and your primary provider in case they need to extend the therapy.    Return immediately to the ER if you have any shortness of breath, are coughing up blood, chest pain, or have any new or concerning symptoms

## 2020-07-18 NOTE — ED PROVIDER NOTES
History     Chief Complaint   Patient presents with     Abnormal Labs     HPI  Shaneka Alvarez is a 60 year old female who resents to the ER after an abnormal imaging results.  She had undergone a PET scan earlier today, was ordered by her oncologist, since the patient has a history of breast cancer and has undergone chemotherapy, radiation, bilateral mastectomy.  She is currently undergoing chemotherapy on Eribulin.  Her last chemotherapy was on 7/7/2020.  After she had her imaging done today, she was called by radiology at home, informing her of incidental finding of pulmonary embolism.  She was instructed to immediately to the ER and that she would likely be admitted to the hospital.  She initially told the caller that she was between [place] and the [facility], and so she could easily get to either hospital.  Her son who is at the bedside says that they are much closer to [place] which is why she came here.  See on the documentation that they instructed her to go to the [place].  She denies experiencing any shortness of breath, no chest pain, has not had any swelling in her lower extremities.  She has not had blood clots before.      Combined Report of:    PET and CT on  7/17/2020 3:52 PM :     1. PET of the neck, chest, abdomen, and pelvis.  2. PET CT Fusion for Attenuation Correction and Anatomical  Localization:    3. Diagnostic CT scan of the chest, abdomen, and pelvis with  intravenous contrast for interpretation.  3. CT of the chest, abdomen and pelvis obtained for diagnostic  interpretation.  4. 3D MIP and PET-CT fused images were processed on an independent  workstation and archived to PACS and reviewed by a radiologist.     Technique:     1. PET: The patient received 13.72 mCi of F-18-FDG; the serum glucose  was 97 prior to administration, body weight was 59 kg. Images were  evaluated in the axial, sagittal, and coronal planes as well as the  rotational whole body MIP. Images were  acquired from the Vertex to the  Feet.     UPTAKE WAS MEASURED AT 60 MINUTES.      BACKGROUND:  Liver SUV max= 3.5,   Aorta Blood SUV Max: 2.6.      2. CT: Volumetric acquisition for clinical interpretation of the  chest, abdomen, and pelvis acquired at 3 mm sections after the  uneventful administration of intravenous contrast. The chest, abdomen,  and pelvis were evaluated at 5 mm sections in bone, soft tissue, and  lung windows.       The patient received 80 cc. Of Isovue 370 intravenously and 500 mL  oral Breeza contrast for the examination.       3. 3D MIP and PET-CT fused images were processed on an independent  workstation and archived to PACS and reviewed by a radiologist.     INDICATION: Metastatic breast cancer     ADDITIONAL INFORMATION OBTAINED FROM EMR: 60-year-old woman with  history of invasive lobular carcinoma of the right breast diagnosed in  2006 treated with chemoradiation and hormonal therapy. Underwent  bilateral mastectomy 2016. Radiotherapy to the lumbar spine T12-L5 in  September 2019. Currently undergoing chemotherapy with Eribulin.     COMPARISON: MRI thoracic spine 4/3/2020, PET-CT 3/27/2020, MRI  cervical spine 1/31/2020, PET-CT 1/24/2020     FINDINGS:      HEAD/NECK:  There is no suspicious FDG uptake in the neck. Symmetric FDG uptake to  the aryepiglottic folds is favored to be inflammatory in etiology.     Trace mucosal thickening in the right maxillary sinus. The remaining  paranasal sinuses are relatively clear. The mastoid air cells are  clear. No masses, mass effect or pathologically enlarged lymph nodes  are evident. Small calcifications in or adjacent to the right palatine  tonsil are similar to prior and suggest sequela of prior inflammation,  versus less likely sialoliths. No submandibular ductal ectasia. The  thyroid gland is unremarkable.     CHEST:  There is no suspicious FDG uptake in the chest. Generalized FDG uptake  throughout the esophagus is similar to prior, for  example in the the  distal esophagus/small hiatal hernia with SUV max of 5.7 (series 5,  image 202).     The central tracheobronchial tree is patent. No pleural effusion or  pneumothorax. No focal consolidation. Heart size is within normal  limits. No pericardial effusion. Filling defects within the distal  main left pulmonary artery extending into the left lower lobe  segmental and branch arteries, the proximal left upper lobe segmental  arteries, as well as additional filling defects within the right lower  lobe segmental branch arteries. These are new since 3/27/2020, and are  consistent with pulmonary emboli. No enlarged or hypermetabolic  mediastinal, hilar, or axillary lymph nodes. Postsurgical changes of  right axillary lymph node dissection. Post surgical changes of  bilateral mastectomy with implant reconstruction.     ABDOMEN AND PELVIS:  There is no suspicious FDG uptake in the abdomen or pelvis.     The liver, pancreas, spleen, and adrenal glands are unremarkable. The  gallbladder is surgically absent. No intrahepatic or extra hepatic  biliary ductal dilatation. Symmetric nephrographic enhancement. No  hydronephrosis. Bladder and uterus are unremarkable. Normal caliber of  the small and large bowel. No fluid collection or free fluid. No  enlarged or hypermetabolic lymph nodes in the abdomen or pelvis.     LOWER EXTREMITIES:   No abnormal masses or hypermetabolic soft tissue lesions.     BONES:   Increased FDG uptake to the lytic lesion in the T9 lamina and T10  right pedicle, with SUV max 5.3 (series 5, image 206), previously 3.9.        Increased FDG uptake to the left anterior fifth rib at the  costochondral junction, SUV max 3.9 (series 5, image 211), previously  2.5. Additional chronic bilateral rib fractures with mild FDG uptake  are similar to prior.     Additional scattered lytic lesions in the calvarium, spine, pelvis,  and right femoral neck are similar to prior and demonstrate only the  levels  of FDG uptake. For example (series 5):  - Image 110: Left side of C4 vertebral body, SUV max 2.7, previously  2.7.  - Image 328: Right iliac crest SUV max 2.7, previously 2.6.  - Image 341: RIght iliac crest SUV max 2.1, previously 2.2.  - Image 379: Right femoral neck SUV max 2.2, previously 2.9.     Multilevel degenerative changes in the spine. Unchanged severe  compression deformity of the L1 vertebral body with greater than 75%  central height loss. Relative photopenia to the T12-L5 vertebral  bodies on PET images corresponds with history of radiotherapy.                                                                         IMPRESSION: In this patient with a history of metastatic breast cancer  status post chemotherapy, radiotherapy, and bilateral mastectomies:     1. Increased FDG uptake to the lytic lesion in the T9-10 posterior  elements concerning for progression of viable tumor.     2. Increased FDG uptake to the anterior left 5th rib at the  costochondral junction, indeterminant. Differential diagnosis includes  degenerative uptake, posttraumatic changes, and progressive  metastasis. Trauma is felt less likely given absence of traumatic  findings on CT, although multiple additional chronic rib fractures are  seen.     3. Additional lytic lesions with low levels of FDG uptake are similar  to 3/27/2020 and most consistent with treated disease.     4. New acute bilateral pulmonary emboli in the distal left main  pulmonary artery extending in the left upper and lower lobes, and in  the segmental and branch arteries in the right lower lobe.     5. Generalized diffuse FDG uptake to the esophagus and small hiatal  hernia, most consistent with inflammation/esophagitis.        [Urgent Result: Acute pulmonary emboli.]     Finding was identified on 7/17/2020 3:34 PM.      Multiple attempts were made to reach oncology service as soon as  finding was identified without success.  Dr. Norton called the  patient  directly at 4:20 PM and communicated the result of acute  pulmonary emboli, and recommended that patient be seen in the  emergency department for treatment.  Patient expressed her  understanding and plan to go to the [place] emergency department.  Dr. Norton called the emergency department at 4:30 PM to let them  know patient was on her way to expedite care.      Allergies:  Allergies   Allergen Reactions     Bactrim [Sulfamethoxazole W/Trimethoprim]      Internal bleeding     Copaxone [Glatiramer] Hives       Problem List:    Patient Active Problem List    Diagnosis Date Noted     Bone metastases (H) 10/22/2019     Priority: Medium     Metastatic breast cancer (H) 09/18/2019     Priority: Medium     Metastatic disease (H) 08/31/2019     Priority: Medium     S/P breast reconstruction, bilateral 07/31/2018     Priority: Medium     Anxiety and depression 10/25/2017     Priority: Medium     Hx of breast cancer 10/25/2017     Priority: Medium     Major depressive disorder, recurrent episode, moderate (H) 04/07/2016     Priority: Medium     Anxiety 03/12/2016     Priority: Medium     Migraine without aura and without status migrainosus, not intractable 10/23/2015     Priority: Medium     Obesity, Class II, BMI 35-39.9 10/23/2015     Priority: Medium     Multiple sclerosis (H) 08/11/2015     Priority: Medium     CARDIOVASCULAR SCREENING; LDL GOAL LESS THAN 160 08/11/2015     Priority: Medium     Raynaud's syndrome 08/11/2015     Priority: Medium     Breast cancer (H) 08/11/2015     Priority: Medium     Age 44       Complication of anesthesia 08/11/2015     Priority: Medium     Arthritis 08/11/2015     Priority: Medium     Autoimmune disorder (H) 08/11/2015     Priority: Medium     Other insomnia 08/11/2015     Priority: Medium     Medication management contract agreement 08/11/2015     Priority: Medium     Ambien 12.5 mg, dispense 30 per month, follow up every 6 months        Neurogenic bladder 12/22/2014      Priority: Medium     Vision changes 2014     Priority: Medium     Demyelinating disorder (H) 2014     Priority: Medium     Weakness 2014     Priority: Medium     GERD (gastroesophageal reflux disease) 10/22/2014     Priority: Medium     History of breast cancer 10/22/2014     Priority: Medium     Overview:   stage 3, diagnosed in  s/p double mastectomy, chemo and radiation.        Migraine 10/22/2014     Priority: Medium        Past Medical History:    Past Medical History:   Diagnosis Date     Breast cancer (H)      Common migraine      H/O bilateral mastectomy      Mild major depression (H)      Multiple sclerosis (H)        Past Surgical History:    Past Surgical History:   Procedure Laterality Date     ENDOBRONCHIAL ULTRASOUND FLEXIBLE N/A 2019    Procedure: Flexible Bronchoscopy, Endobronchial Ultrasound, Radial Probe;  Surgeon: Sree Sanchez MD;  Location: [facility] OR     HC REMOVAL OF OVARY/TUBE(S)       HERNIA REPAIR, UMBILICAL       mastectomy  Bilateral 2006     MASTECTOMY, BILATERAL         Family History:    Family History   Problem Relation Age of Onset     Breast Cancer Maternal Aunt 50         at 85     Breast Cancer Cousin 40     Breast Cancer Mother 49        2nd primary, contralateral breast at 69;  at 86     Melanoma Mother      Breast Cancer Maternal Grandmother 40     Ovarian Cancer Maternal Grandmother      Breast Cancer Paternal Grandmother 50         at 60     Brain Cancer Cousin 20     Breast Cancer Paternal Aunt 50         at 60     Breast Cancer Cousin 45        paternal cousin       Social History:  Marital Status:   [2]  Social History     Tobacco Use     Smoking status: Former Smoker     Types: Cigarettes     Last attempt to quit: 2005     Years since quittin.6     Smokeless tobacco: Never Used   Substance Use Topics     Alcohol use: Yes     Alcohol/week: 2.0 standard drinks     Types: 1 Glasses of wine, 1 Cans of beer per  "week     Comment: TWICE A WEEK     Drug use: No        Medications:    acetaminophen (TYLENOL) 500 MG tablet  busPIRone (BUSPAR) 15 MG tablet  calcium citrate-vitamin D (CITRACAL) 315-250 MG-UNIT TABS  Cholecalciferol (VITAMIN D3 PO)  cyclopentolate (CYCLOGYL) 1 % ophthalmic solution  enoxaparin ANTICOAGULANT (LOVENOX) 60 MG/0.6ML syringe  ibuprofen (ADVIL/MOTRIN) 600 MG tablet  LORazepam (ATIVAN) 0.5 MG tablet  magnesium 250 MG tablet  morphine (MS CONTIN) 15 MG CR tablet  prochlorperazine (COMPAZINE) 10 MG tablet  propranolol (INDERAL LA) 60 MG 24 hr capsule  traZODone (DESYREL) 50 MG tablet  venlafaxine (EFFEXOR-ER) 225 MG TB24 24 hr tablet  diclofenac (VOLTAREN) 1 % topical gel  morphine (MSIR) 15 MG IR tablet  naloxone (NARCAN) 1 mg/mL for intranasal kit (2 syringes with 2 mucosal atomizer device)  polyethylene glycol (MIRALAX/GLYCOLAX) packet  senna-docusate (SENOKOT-S/PERICOLACE) 8.6-50 MG tablet  syringe/needle, disp, (B-D INTE[employer]RA SYRINGE) 23G X 1\" 3 ML MISC          Review of Systems   All other systems reviewed and are negative.      Physical Exam   BP: (!) 148/104  Pulse: 91  Temp: 97.8  F (36.6  C)  Resp: 17  Weight: 59.9 kg (132 lb)  SpO2: 100 %      Physical Exam  Vitals signs and nursing note reviewed.   Constitutional:       General: She is not in acute distress.     Appearance: Normal appearance. She is not diaphoretic.   HENT:      Head: Atraumatic.      Mouth/Throat:      Mouth: Mucous membranes are moist.      Pharynx: No oropharyngeal exudate.   Eyes:      General: No scleral icterus.     Pupils: Pupils are equal, round, and reactive to light.   Cardiovascular:      Rate and Rhythm: Normal rate and regular rhythm.      Heart sounds: Normal heart sounds.   Pulmonary:      Effort: Pulmonary effort is normal. No respiratory distress.      Breath sounds: Normal breath sounds. No wheezing or rhonchi.   Abdominal:      General: Bowel sounds are normal.      Palpations: Abdomen is soft.      " Tenderness: There is no abdominal tenderness.   Musculoskeletal:         General: No tenderness.      Right lower leg: No edema.      Left lower leg: No edema.   Skin:     General: Skin is warm.      Findings: No rash.   Neurological:      Mental Status: She is alert.   Psychiatric:         Mood and Affect: Mood normal.         Behavior: Behavior normal.         ED Course        Procedures               Critical Care time:  none               Results for orders placed or performed during the hospital encounter of 07/17/20 (from the past 24 hour(s))   CBC with platelets differential   Result Value Ref Range    WBC 3.5 (L) 4.0 - 11.0 10e9/L    RBC Count 4.62 3.8 - 5.2 10e12/L    Hemoglobin 13.2 11.7 - 15.7 g/dL    Hematocrit 40.1 35.0 - 47.0 %    MCV 87 78 - 100 fl    MCH 28.6 26.5 - 33.0 pg    MCHC 32.9 31.5 - 36.5 g/dL    RDW 16.5 (H) 10.0 - 15.0 %    Platelet Count 208 150 - 450 10e9/L    Diff Method Manual Differential     % Neutrophils 55.0 %    % Lymphocytes 19.0 %    % Monocytes 26.0 %    % Eosinophils 0.0 %    % Basophils 0.0 %    Absolute Neutrophil 1.9 1.6 - 8.3 10e9/L    Absolute Lymphocytes 0.7 (L) 0.8 - 5.3 10e9/L    Absolute Monocytes 0.9 0.0 - 1.3 10e9/L    Absolute Eosinophils 0.0 0.0 - 0.7 10e9/L    Absolute Basophils 0.0 0.0 - 0.2 10e9/L       Medications   enoxaparin ANTICOAGULANT (LOVENOX) injection 60 mg (has no administration in time range)       Assessments & Plan (with Medical Decision Making)  Shaneka is a 60-year-old female with past medical history of breast cancer status post mastectomy, chemotherapy, and radiation, is currently receiving chemotherapy, who presents to the ER after abnormal imaging study.  She had a PET scan performed today on the order of her oncologist.  This was done on an outpatient basis.  When she had gotten back home, she received a call from radiology who told her that she had PE.  She was instructed to go immediately to the ER.  She denies any symptoms such as  shortness of breath, chest pain, or lower extremity swelling.  See history and focused physical exam as above  PET scan result reviewed, see report copied above.  Since the patient is completely asymptomatic, has normal vital signs, and has normal oxygen saturation on room air, I think this patient could be safely discharged home on anticoagulant.  She would very much like to go home and not spend any additional time in the hospital, especially since she is immune compromised currently on chemotherapy for breast cancer.  I said that I would prefer to speak with her oncologist prior to making any decisions for anticoagulant treatment, but I will update her after have spoken with the oncologist  Called and spoke with Dr. Ruelas, on-call for hematology/oncology.  Patient last had lab work drawn on 7/7/2020 before her chemotherapy infusion.  Her platelet count was normal at 188 at that time.  Dr. Ruelas recommends repeating CBC to evaluate platelets, and if they are within normal limits and the patient should be ideally started on Lovenox.  This can be done on an outpatient basis, she does not need to be hospitalized to receive Lovenox injections.  Formed Shaneka of this consultation and the recommendations of the oncologist.  We will check a CBC and then give her a Lovenox injection here in the ER.  Will send a prescription home with her to perform at home.  She says she feels very comfortable giving herself injections at home as she is done in the past.  Initially insisted that labs be drawn off her port, but staff explained that the risk of infection for single blood draw is too high, and a peripheral draw would be ideal.  Patient continued to argue with staff, but was eventually agreeable to having a fingerstick collection for CBC.  Phlebotomist then called back and said that fingerstick did not give an accurate result of the platelets, there was some clumping.  A peripheral draw would be much better.  When RN went  "to explain this to the patient, she began getting very upset and was refusing to have this done.  I then went and spoke with her myself and explained the reasoning behind a peripheral draw and not using her port.  She was agreeable but was still very upset, she said that \"you have 30 minutes and if you do not get it done then I am walking out\".  She is very frustrated saying that she has been here for 5 hours, even though she has only been here for just under 4 hours, and she is frustrated with how long this is taking.  I again apologized and said that we will be working as quickly as we possibly can  Phlebotomy returned and was able to get a peripheral draw.  Were able to get onto the 6 Lovenox injections from the inpatient pharmacy, there are none at outpatient pharmacy.  We will give 1 to the patient tonight since her platelet count returned within normal limits, and will send the remainder of the injections home with her.  And additional prescription for continued course of Lovenox was sent to her [facility] pharmacy, since they may have more in stock, and informed her that she did not need to get them until Monday, 7/20/2020.  Instructed her on administration, patient again felt very comfortable with doing this in the ER.  Discussed reasons to return to the ER, including shortness of breath, chest pain, or any excessive bruising or bleeding.  She understands and agrees and is discharged in no acute distress.  She will follow-up with her oncologist and her primary provider     I have reviewed the nursing notes.    I have reviewed the findings, diagnosis, plan and need for follow up with the patient.       Discharge Medication List as of 7/17/2020 10:02 PM          Final diagnoses:   Acute pulmonary embolism without acute cor pulmonale, unspecified pulmonary embolism type (H)       7/17/2020   [facility] EMERGENCY DEPARTMENT     Arlette Keller,   07/17/20 2241    "

## 2020-07-21 ENCOUNTER — INFUSION THERAPY VISIT (OUTPATIENT)
Dept: INFUSION THERAPY | Facility: CLINIC | Age: 60
End: 2020-07-21
Payer: COMMERCIAL

## 2020-07-21 ENCOUNTER — VIRTUAL VISIT (OUTPATIENT)
Dept: ONCOLOGY | Facility: CLINIC | Age: 60
End: 2020-07-21
Attending: INTERNAL MEDICINE
Payer: COMMERCIAL

## 2020-07-21 ENCOUNTER — ONCOLOGY VISIT (OUTPATIENT)
Dept: ONCOLOGY | Facility: CLINIC | Age: 60
End: 2020-07-21
Payer: COMMERCIAL

## 2020-07-21 VITALS
WEIGHT: 130.4 LBS | BODY MASS INDEX: 23.85 KG/M2 | TEMPERATURE: 98.2 F | HEART RATE: 76 BPM | OXYGEN SATURATION: 99 % | DIASTOLIC BLOOD PRESSURE: 90 MMHG | SYSTOLIC BLOOD PRESSURE: 127 MMHG

## 2020-07-21 DIAGNOSIS — C50.912 BILATERAL MALIGNANT NEOPLASM OF BREAST IN FEMALE, UNSPECIFIED ESTROGEN RECEPTOR STATUS, UNSPECIFIED SITE OF BREAST (H): ICD-10-CM

## 2020-07-21 DIAGNOSIS — I26.99 ACUTE PULMONARY EMBOLISM WITHOUT ACUTE COR PULMONALE, UNSPECIFIED PULMONARY EMBOLISM TYPE (H): Primary | ICD-10-CM

## 2020-07-21 DIAGNOSIS — C50.911 BILATERAL MALIGNANT NEOPLASM OF BREAST IN FEMALE, UNSPECIFIED ESTROGEN RECEPTOR STATUS, UNSPECIFIED SITE OF BREAST (H): ICD-10-CM

## 2020-07-21 DIAGNOSIS — G47.00 INSOMNIA, UNSPECIFIED TYPE: ICD-10-CM

## 2020-07-21 DIAGNOSIS — C50.919 METASTATIC BREAST CANCER: ICD-10-CM

## 2020-07-21 DIAGNOSIS — C50.919 METASTATIC BREAST CANCER: Primary | ICD-10-CM

## 2020-07-21 LAB
ALBUMIN SERPL-MCNC: 3.2 G/DL (ref 3.4–5)
ALP SERPL-CCNC: 86 U/L (ref 40–150)
ALT SERPL W P-5'-P-CCNC: 49 U/L (ref 0–50)
ANION GAP SERPL CALCULATED.3IONS-SCNC: 4 MMOL/L (ref 3–14)
AST SERPL W P-5'-P-CCNC: 31 U/L (ref 0–45)
BASOPHILS # BLD AUTO: 0 10E9/L (ref 0–0.2)
BASOPHILS NFR BLD AUTO: 1.2 %
BILIRUB SERPL-MCNC: 0.2 MG/DL (ref 0.2–1.3)
BUN SERPL-MCNC: 6 MG/DL (ref 7–30)
CALCIUM SERPL-MCNC: 8.4 MG/DL (ref 8.5–10.1)
CANCER AG27-29 SERPL-ACNC: 238 U/ML (ref 0–39)
CHLORIDE SERPL-SCNC: 106 MMOL/L (ref 94–109)
CO2 SERPL-SCNC: 29 MMOL/L (ref 20–32)
CREAT SERPL-MCNC: 0.59 MG/DL (ref 0.52–1.04)
DIFFERENTIAL METHOD BLD: ABNORMAL
EOSINOPHIL # BLD AUTO: 0 10E9/L (ref 0–0.7)
EOSINOPHIL NFR BLD AUTO: 0.3 %
ERYTHROCYTE [DISTWIDTH] IN BLOOD BY AUTOMATED COUNT: 16.3 % (ref 10–15)
GFR SERPL CREATININE-BSD FRML MDRD: >90 ML/MIN/{1.73_M2}
GLUCOSE SERPL-MCNC: 99 MG/DL (ref 70–99)
HCT VFR BLD AUTO: 36.7 % (ref 35–47)
HGB BLD-MCNC: 11.9 G/DL (ref 11.7–15.7)
IMM GRANULOCYTES # BLD: 0.1 10E9/L (ref 0–0.4)
IMM GRANULOCYTES NFR BLD: 3.7 %
LYMPHOCYTES # BLD AUTO: 0.5 10E9/L (ref 0.8–5.3)
LYMPHOCYTES NFR BLD AUTO: 15.6 %
MAGNESIUM SERPL-MCNC: 2 MG/DL (ref 1.6–2.3)
MCH RBC QN AUTO: 28.3 PG (ref 26.5–33)
MCHC RBC AUTO-ENTMCNC: 32.4 G/DL (ref 31.5–36.5)
MCV RBC AUTO: 87 FL (ref 78–100)
MONOCYTES # BLD AUTO: 0.6 10E9/L (ref 0–1.3)
MONOCYTES NFR BLD AUTO: 16.1 %
NEUTROPHILS # BLD AUTO: 2.2 10E9/L (ref 1.6–8.3)
NEUTROPHILS NFR BLD AUTO: 63.1 %
PLATELET # BLD AUTO: 203 10E9/L (ref 150–450)
POTASSIUM SERPL-SCNC: 4 MMOL/L (ref 3.4–5.3)
PROT SERPL-MCNC: 6.2 G/DL (ref 6.8–8.8)
RBC # BLD AUTO: 4.2 10E12/L (ref 3.8–5.2)
SODIUM SERPL-SCNC: 139 MMOL/L (ref 133–144)
WBC # BLD AUTO: 3.5 10E9/L (ref 4–11)

## 2020-07-21 PROCEDURE — 99214 OFFICE O/P EST MOD 30 MIN: CPT | Mod: 95 | Performed by: INTERNAL MEDICINE

## 2020-07-21 PROCEDURE — 99207 ZZC NO CHARGE NURSE ONLY: CPT

## 2020-07-21 PROCEDURE — 96367 TX/PROPH/DG ADDL SEQ IV INF: CPT | Performed by: NURSE PRACTITIONER

## 2020-07-21 PROCEDURE — 85025 COMPLETE CBC W/AUTO DIFF WBC: CPT | Performed by: INTERNAL MEDICINE

## 2020-07-21 PROCEDURE — 83735 ASSAY OF MAGNESIUM: CPT | Performed by: INTERNAL MEDICINE

## 2020-07-21 PROCEDURE — 80053 COMPREHEN METABOLIC PANEL: CPT | Performed by: INTERNAL MEDICINE

## 2020-07-21 PROCEDURE — 86300 IMMUNOASSAY TUMOR CA 15-3: CPT | Performed by: INTERNAL MEDICINE

## 2020-07-21 PROCEDURE — 96409 CHEMO IV PUSH SNGL DRUG: CPT | Performed by: NURSE PRACTITIONER

## 2020-07-21 RX ORDER — LORAZEPAM 2 MG/ML
0.5 INJECTION INTRAMUSCULAR EVERY 4 HOURS PRN
Status: CANCELLED
Start: 2020-07-21

## 2020-07-21 RX ORDER — METHYLPREDNISOLONE SODIUM SUCCINATE 125 MG/2ML
125 INJECTION, POWDER, LYOPHILIZED, FOR SOLUTION INTRAMUSCULAR; INTRAVENOUS
Status: CANCELLED
Start: 2020-07-28

## 2020-07-21 RX ORDER — DIPHENHYDRAMINE HYDROCHLORIDE 50 MG/ML
50 INJECTION INTRAMUSCULAR; INTRAVENOUS
Status: CANCELLED
Start: 2020-07-28

## 2020-07-21 RX ORDER — DIPHENHYDRAMINE HYDROCHLORIDE 50 MG/ML
50 INJECTION INTRAMUSCULAR; INTRAVENOUS
Status: CANCELLED
Start: 2020-07-21

## 2020-07-21 RX ORDER — HEPARIN SODIUM (PORCINE) LOCK FLUSH IV SOLN 100 UNIT/ML 100 UNIT/ML
5 SOLUTION INTRAVENOUS
Status: CANCELLED | OUTPATIENT
Start: 2020-07-21

## 2020-07-21 RX ORDER — NALOXONE HYDROCHLORIDE 0.4 MG/ML
.1-.4 INJECTION, SOLUTION INTRAMUSCULAR; INTRAVENOUS; SUBCUTANEOUS
Status: CANCELLED | OUTPATIENT
Start: 2020-07-21

## 2020-07-21 RX ORDER — HEPARIN SODIUM (PORCINE) LOCK FLUSH IV SOLN 100 UNIT/ML 100 UNIT/ML
5 SOLUTION INTRAVENOUS
Status: DISCONTINUED | OUTPATIENT
Start: 2020-07-21 | End: 2020-07-21 | Stop reason: HOSPADM

## 2020-07-21 RX ORDER — SODIUM CHLORIDE 9 MG/ML
1000 INJECTION, SOLUTION INTRAVENOUS CONTINUOUS PRN
Status: CANCELLED
Start: 2020-07-28

## 2020-07-21 RX ORDER — MEPERIDINE HYDROCHLORIDE 25 MG/ML
25 INJECTION INTRAMUSCULAR; INTRAVENOUS; SUBCUTANEOUS EVERY 30 MIN PRN
Status: CANCELLED | OUTPATIENT
Start: 2020-07-28

## 2020-07-21 RX ORDER — MEPERIDINE HYDROCHLORIDE 25 MG/ML
25 INJECTION INTRAMUSCULAR; INTRAVENOUS; SUBCUTANEOUS EVERY 30 MIN PRN
Status: CANCELLED | OUTPATIENT
Start: 2020-07-21

## 2020-07-21 RX ORDER — HEPARIN SODIUM,PORCINE 10 UNIT/ML
5 VIAL (ML) INTRAVENOUS
Status: CANCELLED | OUTPATIENT
Start: 2020-07-21

## 2020-07-21 RX ORDER — TRAZODONE HYDROCHLORIDE 50 MG/1
50 TABLET, FILM COATED ORAL AT BEDTIME
Qty: 30 TABLET | Refills: 1 | Status: SHIPPED | OUTPATIENT
Start: 2020-07-21 | End: 2020-08-11

## 2020-07-21 RX ORDER — EPINEPHRINE 1 MG/ML
0.3 INJECTION, SOLUTION INTRAMUSCULAR; SUBCUTANEOUS EVERY 5 MIN PRN
Status: CANCELLED | OUTPATIENT
Start: 2020-07-28

## 2020-07-21 RX ORDER — HEPARIN SODIUM (PORCINE) LOCK FLUSH IV SOLN 100 UNIT/ML 100 UNIT/ML
5 SOLUTION INTRAVENOUS
Status: CANCELLED | OUTPATIENT
Start: 2020-07-28

## 2020-07-21 RX ORDER — ALBUTEROL SULFATE 90 UG/1
1-2 AEROSOL, METERED RESPIRATORY (INHALATION)
Status: CANCELLED
Start: 2020-07-28

## 2020-07-21 RX ORDER — EPINEPHRINE 1 MG/ML
0.3 INJECTION, SOLUTION INTRAMUSCULAR; SUBCUTANEOUS EVERY 5 MIN PRN
Status: CANCELLED | OUTPATIENT
Start: 2020-07-21

## 2020-07-21 RX ORDER — SODIUM CHLORIDE 9 MG/ML
1000 INJECTION, SOLUTION INTRAVENOUS CONTINUOUS PRN
Status: CANCELLED
Start: 2020-07-21

## 2020-07-21 RX ORDER — NALOXONE HYDROCHLORIDE 0.4 MG/ML
.1-.4 INJECTION, SOLUTION INTRAMUSCULAR; INTRAVENOUS; SUBCUTANEOUS
Status: CANCELLED | OUTPATIENT
Start: 2020-07-28

## 2020-07-21 RX ORDER — ALBUTEROL SULFATE 0.83 MG/ML
2.5 SOLUTION RESPIRATORY (INHALATION)
Status: CANCELLED | OUTPATIENT
Start: 2020-07-21

## 2020-07-21 RX ORDER — LORAZEPAM 2 MG/ML
0.5 INJECTION INTRAMUSCULAR EVERY 4 HOURS PRN
Status: CANCELLED
Start: 2020-07-28

## 2020-07-21 RX ORDER — ALBUTEROL SULFATE 90 UG/1
1-2 AEROSOL, METERED RESPIRATORY (INHALATION)
Status: CANCELLED
Start: 2020-07-21

## 2020-07-21 RX ORDER — EPINEPHRINE 0.3 MG/.3ML
0.3 INJECTION SUBCUTANEOUS EVERY 5 MIN PRN
Status: CANCELLED | OUTPATIENT
Start: 2020-07-21

## 2020-07-21 RX ORDER — EPINEPHRINE 0.3 MG/.3ML
0.3 INJECTION SUBCUTANEOUS EVERY 5 MIN PRN
Status: CANCELLED | OUTPATIENT
Start: 2020-07-28

## 2020-07-21 RX ORDER — ALBUTEROL SULFATE 0.83 MG/ML
2.5 SOLUTION RESPIRATORY (INHALATION)
Status: CANCELLED | OUTPATIENT
Start: 2020-07-28

## 2020-07-21 RX ORDER — HEPARIN SODIUM,PORCINE 10 UNIT/ML
5 VIAL (ML) INTRAVENOUS
Status: CANCELLED | OUTPATIENT
Start: 2020-07-28

## 2020-07-21 RX ORDER — METHYLPREDNISOLONE SODIUM SUCCINATE 125 MG/2ML
125 INJECTION, POWDER, LYOPHILIZED, FOR SOLUTION INTRAMUSCULAR; INTRAVENOUS
Status: CANCELLED
Start: 2020-07-21

## 2020-07-21 RX ADMIN — Medication 250 ML: at 12:25

## 2020-07-21 RX ADMIN — HEPARIN SODIUM (PORCINE) LOCK FLUSH IV SOLN 100 UNIT/ML 5 ML: 100 SOLUTION at 13:09

## 2020-07-21 RX ADMIN — HEPARIN SODIUM (PORCINE) LOCK FLUSH IV SOLN 100 UNIT/ML 5 ML: 100 SOLUTION at 10:38

## 2020-07-21 ASSESSMENT — PAIN SCALES - GENERAL: PAINLEVEL: NO PAIN (0)

## 2020-07-21 NOTE — PROGRESS NOTES
ONCOLOGY FOLLOW UP NOTE: 7/21/2020        I took the history from reviewing the previous notes that she was following with Dr. Amaya.  I have copied and updated from prior notes.    ONCOLOGY History:    1.  January 2006:  Diagnosed with Stage IIB, T2 N1 M0 invasive lobular carcinoma of the right breast.  Final pathology showed a 4.5 x 3.8 x 2.5 cm, 01/14 lymph nodes positive.  Estrogen, progesterone receptor positive, HER-2 negative.     2.  Genetics.  BRCA1 and 2 mutations not detected. 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       THERAPY TO DATE:   1.  2006:  ECOG 2104 protocol of dose-dense Adriamycin, Cytoxan and Avastin x4 cycles followed by Taxol and Avastin x4 cycles.   2.  03/2007:  Completed 1 year Avastin.   3.  11/2006-01/2007:  Radiotherapy to the right breast of 5040 cGy.   4.  [id number]:  Aromasin.  Stopped after moving to the [facility].   5.  01/2016:  Right modified radical mastectomy with latissimus dorsi flap reconstruction and a left prophylactic mastectomy with latissimus dorsi flap reconstruction.     She recently had MRI of the brain as a follow-up for multiple sclerosis and there were multiple calvarial lesions noted which was suspicious for metastatic disease.  There was no evidence of new multiple sclerosis lesions.  She had a PET scan on 8/30/2019 which showed widespread bone metastatic lesions (calvarium, spine, sacrum, pelvis, ribs most prominent at C7, T3, L1 and L4), hypermetabolic 3 cm lesion in LLL, and mediastinal/hilar LN. CEA wnl at 1.9 and C27-29 elevated to 415 (28 on 8/23/16). A CT guided biopsy of the right iliac bone was obtained on 9/4/19, pathology consistent with metastatic adenocarcinoma (breast), ER/AR negative, HER-2 negative PDL 1 < 1%. A second biopsy was take of the LLL nodule via EBUS on 9/5/19 did not show any malignancy.    C/T/L spine MRI 8/3/19 to 9/2/19 with numerous enhancing lesions of the C, T and L spine, largest around T9 and L1. No  evidence of cord compression. Has a L1 compression fracture and impending L4.     Received radiation T12-L5 3000 cGy in 10 fractions from 9/6/2019 till 9/18/2019.  Neurosurgery recommended no surgical intervention but to wear Lucas brace when out of bed and HOB >30 degrees    She started palliative Xeloda 2000/1500 on 10/1/2019 but after just a few doses she noticed nausea, red eyes blurred vision, loss of appetite.  She stopped taking it after 5 doses and was seen by nurse practitioner on 10/7/2019 when she was improving.  At that point she was restarted on Xeloda at a lower dose of 1000 mg twice a day.  As she was not able to tolerate even the lower dose with extreme nausea and vomiting and feeling extremely fatigued and blurry vision, we decided on stopping Xeloda.    We decided on repeating scans and MRI brain on 10/25/2019 showed no intracranial metastatic disease.   Multiple enhancing calvarial lesions may be increased in number and are suggestive of metastatic disease. Thinner imaging on the current study may identify the smaller lesions and may be responsible for  identified more lesions.   There is a single focus of T2 hyperintense signal within the anterior right temporal lobe may represent interval demyelination. There is no evidence for active demyelination.    PET/CT on 11/1/2019 showed favorable response to therapy and Overall FDG uptake within scattered osseous metastases is decreased since 8/30/2019, particularly within the pelvis. Increased sclerotic appearance of L1 and L4 pathologic compression deformities, likely sequela of recently completed palliative radiation therapy.    No significant FDG uptake in previously demonstrated hypermetabolic mediastinal lymph nodes. Biopsy negative on 9/5/2019.  There is also decreased FDG uptake in the left lower lobe (biopsy negative for  malignancy), now with dense consolidation containing air bronchograms suggestive of infection versus aspiration.  There is  diffuse FDG uptake within the esophagus, consistent with esophagitis.    Because of intolerance to Xeloda we decided on switching to weekly paclitaxel on 11/5/2019.    C#2 Taxol 12/4/19- started with 70mg/m2 dose reduction    C#3 Taxol 12/30/2019     PET/CT on 1/24/2020 showed progression of the disease in some of the bone lesions including increase in size and lytic lesion within the vertebral body of C4 measuring 1 cm as opposed to 0.6 cm previously and a new central soft tissue nodule with SUV max 4.1 when previously it was non-hypermetabolic.  There is also some progression noted in the right proximal femur and right iliac bone.  There is decreased metabolic uptake in the right lamina of T9 with SUV max 4.7 when previously it was 8.0.  Other lesions are stable.    CA 27-29 also had increased significantly and it was 257 on 1/28/2020.    We decided on switching to eribulin on 1/28/2020.    C#2 2/18/2020  C#2 D#8 2/25/2020    C#3 D#1 3/18/2020 -delayed by 1 week as per patient's preference because she wanted to visit her family.    She had a repeat PET/CT on 3/27/2020 which showed stable size of the bone metastatic lesions although the FDG avidity was less, consistent with treatment response.    She had MRI of the thoracic spine on 4/3/2020 and it was compared to the one which was done in August 2019 and it showed increased size of several metastatic lesions most notably at T9 but also at T5-T7.  Other lesions at T10, T11 and T12 appeared improved.    CA 27-29 was also down to 222 on 3/18/2020.    Cycle #4 eribulin ( dose reduced to 1.2 mg/m2 )-4/7/2020-   Cycle #5 Eribulin ( 1.2 mg/m2 )- 4/28/2020   Cycle #6 Eribulin ( 1.2 mg/m2 )- 5/19/2020     Cycle #7 Eribulin ( 1.2 mg/m2 )- 6/9/2020    Cycle #8 Eribulin ( 1.2 mg/m2 )- 6/30/2020     She had a repeat PET scan on 7/17/2020 which showed incidental finding of new acute bilateral pulmonary embolism in the distal left main pulmonary artery extending in the left  upper and lower lobes and in the segmental and branch arteries in the right lower lobe.    It also showed mildly increased FDG avidity in the lytic lesion in the T9 lamina and right pedicle with SUV max 5.3, previously 3.9.  That is also slightly increased FDG uptake to the left anterior fifth rib at the costochondral junction SUV max 3.9, previously 2.5.  There is slightly decreased FDG uptake in the right femoral neck lesion SUV max 2.2, previously 2.9.  FDG uptake in other bone lesions is fairly stable.  No new metastasis are seen.    CA 27-29 was 308 on 6/30/2020.  It was 286 on 5/19/2020.    Overall this is consistent with only slight progression versus stable disease.    She was started on Lovenox.    Cycle #9 eribulin 7/21/2020.        I had also recommended starting Zometa so she got dental clearance to start with that.  She received Zometa on 10/23/2019 and 1/14/2020 and 4/7/2020 on a 3 monthly schedule.      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  It was thought that this could be due to Taxol as patient reported to the ophthalmologist that she was on Taxol in September 2019 when her symptoms started.  But she was not on Taxol in September 2019 when she started noticing the visual changes so Taxol is not responsible for this.  The first dose of Taxol was on 11/5/2019.       Interval history.  This is a video visit.  She feels well.  She was seen in the emergency room for acute pulmonary embolism although she was asymptomatic and she has been taking Lovenox injections.  She denies any shortness of breath or chest pain.  Overall her pain from the cancer is under good control with morphine and she takes it once 2 times a day.  Denies any significant nausea or vomiting.  No diarrhea or constipation.  She mentions that the neuropathy is the same.  It has not worsened.  Denies new swellings.  Energy is fair.  She thinks vision is improving.  Overall she is very pleased  "with how treatments are going.  She has problem falling asleep and she uses lorazepam at night but it is not helping.  In the past she has used trazodone which has helped.  She only is using lorazepam for sleeping otherwise she does not need it as per her.        ECOG 1    ROS:  A comprehensive ROS was otherwise neg        I reviewed other history in epic as below.       PAST MEDICAL HISTORY:     1.  Breast cancer  2.  Multiple sclerosis.   3.  Depression.   4.  Migraines.   5.  Hypertension.   6.  Cholecystectomy and umbilical hernia repair.     SOCIAL HISTORY: She smoked for 5 years but quit many years ago.  Drinks alcohol socially.  She lives with her .  She is a teacher in middle school.       FAMILY HISTORY: She mentions that her mother had breast cancer at 49.  Both grandmothers had breast cancer but she is not sure of the age.  A couple of cousins have cancers.  Patient has 3 children who are healthy.    Current Outpatient Medications   Medication     acetaminophen (TYLENOL) 500 MG tablet     busPIRone (BUSPAR) 15 MG tablet     calcium citrate-vitamin D (CITRACAL) 315-250 MG-UNIT TABS     Cholecalciferol (VITAMIN D3 PO)     cyclopentolate (CYCLOGYL) 1 % ophthalmic solution     diclofenac (VOLTAREN) 1 % topical gel     enoxaparin ANTICOAGULANT (LOVENOX) 60 MG/0.6ML syringe     ibuprofen (ADVIL/MOTRIN) 600 MG tablet     LORazepam (ATIVAN) 0.5 MG tablet     magnesium 250 MG tablet     morphine (MS CONTIN) 15 MG CR tablet     morphine (MSIR) 15 MG IR tablet     naloxone (NARCAN) 1 mg/mL for intranasal kit (2 syringes with 2 mucosal atomizer device)     polyethylene glycol (MIRALAX/GLYCOLAX) packet     prochlorperazine (COMPAZINE) 10 MG tablet     propranolol (INDERAL LA) 60 MG 24 hr capsule     senna-docusate (SENOKOT-S/PERICOLACE) 8.6-50 MG tablet     syringe/needle, disp, (B-D INTEGRA SYRINGE) 23G X 1\" 3 ML MISC     traZODone (DESYREL) 50 MG tablet     venlafaxine (EFFEXOR-ER) 225 MG TB24 24 hr tablet "     No current facility-administered medications for this visit.      Facility-Administered Medications Ordered in Other Visits   Medication     heparin 100 UNIT/ML injection 5 mL     sodium chloride (PF) 0.9% PF flush 10-20 mL        Allergies   Allergen Reactions     Bactrim [Sulfamethoxazole W/Trimethoprim]      Internal bleeding     Copaxone [Glatiramer] Hives          PHYSICAL EXAMINATION:     There were no vitals taken for this visit.  Wt Readings from Last 4 Encounters:   07/17/20 59.9 kg (132 lb)   07/07/20 59 kg (130 lb)   06/30/20 58.5 kg (129 lb)   06/16/20 59.7 kg (131 lb 9.6 oz)         Constitutional.  Looks well and in no apparent distress.   Eyes.  Without eye redness or apparent jaundice.   Respiratory.  Non labored breathing. Speaking in full sentences.    Skin.  No concerning skin rashes on the skin visualized.   Neurological.  Is alert and oriented.  Psychiatric.  Mood and affect seem appropriate.      The rest of a comprehensive physical examination is deferred due to [facility] video visit restrictions.    Labs and Imaging.    Reviewed    Combined Report of:    PET and CT on  7/17/2020 3:52 PM :     1. PET of the neck, chest, abdomen, and pelvis.  2. PET CT Fusion for Attenuation Correction and Anatomical  Localization:    3. Diagnostic CT scan of the chest, abdomen, and pelvis with  intravenous contrast for interpretation.  3. CT of the chest, abdomen and pelvis obtained for diagnostic  interpretation.  4. 3D MIP and PET-CT fused images were processed on an independent  workstation and archived to PACS and reviewed by a radiologist.     Technique:     1. PET: The patient received 13.72 mCi of F-18-FDG; the serum glucose  was 97 prior to administration, body weight was 59 kg. Images were  evaluated in the axial, sagittal, and coronal planes as well as the  rotational whole body MIP. Images were acquired from the Vertex to the  Feet.     UPTAKE WAS MEASURED AT 60 MINUTES.       BACKGROUND:  Liver SUV max= 3.5,   Aorta Blood SUV Max: 2.6.      2. CT: Volumetric acquisition for clinical interpretation of the  chest, abdomen, and pelvis acquired at 3 mm sections after the  uneventful administration of intravenous contrast. The chest, abdomen,  and pelvis were evaluated at 5 mm sections in bone, soft tissue, and  lung windows.       The patient received 80 cc. Of Isovue 370 intravenously and 500 mL  oral Breeza contrast for the examination.       3. 3D MIP and PET-CT fused images were processed on an independent  workstation and archived to PACS and reviewed by a radiologist.     INDICATION: Metastatic breast cancer     ADDITIONAL INFORMATION OBTAINED FROM EMR: 60-year-old woman with  history of invasive lobular carcinoma of the right breast diagnosed in  2006 treated with chemoradiation and hormonal therapy. Underwent  bilateral mastectomy 2016. Radiotherapy to the lumbar spine T12-L5 in  September 2019. Currently undergoing chemotherapy with Eribulin.     COMPARISON: MRI thoracic spine 4/3/2020, PET-CT 3/27/2020, MRI  cervical spine 1/31/2020, PET-CT 1/24/2020     FINDINGS:      HEAD/NECK:  There is no suspicious FDG uptake in the neck. Symmetric FDG uptake to  the aryepiglottic folds is favored to be inflammatory in etiology.     Trace mucosal thickening in the right maxillary sinus. The remaining  paranasal sinuses are relatively clear. The mastoid air cells are  clear. No masses, mass effect or pathologically enlarged lymph nodes  are evident. Small calcifications in or adjacent to the right palatine  tonsil are similar to prior and suggest sequela of prior inflammation,  versus less likely sialoliths. No submandibular ductal ectasia. The  thyroid gland is unremarkable.     CHEST:  There is no suspicious FDG uptake in the chest. Generalized FDG uptake  throughout the esophagus is similar to prior, for example in the the  distal esophagus/small hiatal hernia with SUV max of 5.7  (series 5,  image 202).     The central tracheobronchial tree is patent. No pleural effusion or  pneumothorax. No focal consolidation. Heart size is within normal  limits. No pericardial effusion. Filling defects within the distal  main left pulmonary artery extending into the left lower lobe  segmental and branch arteries, the proximal left upper lobe segmental  arteries, as well as additional filling defects within the right lower  lobe segmental branch arteries. These are new since 3/27/2020, and are  consistent with pulmonary emboli. No enlarged or hypermetabolic  mediastinal, hilar, or axillary lymph nodes. Postsurgical changes of  right axillary lymph node dissection. Post surgical changes of  bilateral mastectomy with implant reconstruction.     ABDOMEN AND PELVIS:  There is no suspicious FDG uptake in the abdomen or pelvis.     The liver, pancreas, spleen, and adrenal glands are unremarkable. The  gallbladder is surgically absent. No intrahepatic or extra hepatic  biliary ductal dilatation. Symmetric nephrographic enhancement. No  hydronephrosis. Bladder and uterus are unremarkable. Normal caliber of  the small and large bowel. No fluid collection or free fluid. No  enlarged or hypermetabolic lymph nodes in the abdomen or pelvis.     LOWER EXTREMITIES:   No abnormal masses or hypermetabolic soft tissue lesions.     BONES:   Increased FDG uptake to the lytic lesion in the T9 lamina and T10  right pedicle, with SUV max 5.3 (series 5, image 206), previously 3.9.        Increased FDG uptake to the left anterior fifth rib at the  costochondral junction, SUV max 3.9 (series 5, image 211), previously  2.5. Additional chronic bilateral rib fractures with mild FDG uptake  are similar to prior.     Additional scattered lytic lesions in the calvarium, spine, pelvis,  and right femoral neck are similar to prior and demonstrate only the  levels of FDG uptake. For example (series 5):  - Image 110: Left side of C4  vertebral body, SUV max 2.7, previously  2.7.  - Image 328: Right iliac crest SUV max 2.7, previously 2.6.  - Image 341: RIght iliac crest SUV max 2.1, previously 2.2.  - Image 379: Right femoral neck SUV max 2.2, previously 2.9.     Multilevel degenerative changes in the spine. Unchanged severe  compression deformity of the L1 vertebral body with greater than 75%  central height loss. Relative photopenia to the T12-L5 vertebral  bodies on PET images corresponds with history of radiotherapy.                                                                         IMPRESSION: In this patient with a history of metastatic breast cancer  status post chemotherapy, radiotherapy, and bilateral mastectomies:     1. New acute bilateral pulmonary emboli in the distal left main  pulmonary artery extending in the left upper and lower lobes, and in  the segmental and branch arteries in the right lower lobe.     2. Increased FDG uptake to the lytic lesion in the T9-10 posterior  elements concerning for progression of viable tumor.     3. Increased FDG uptake to the anterior left 5th rib at the  costochondral junction, indeterminant. Differential diagnosis includes  degenerative uptake, posttraumatic changes, and progressive  metastasis. Trauma is felt less likely given absence of traumatic  findings on CT, although multiple additional chronic rib fractures are  seen.     4. Additional lytic lesions with low levels of FDG uptake are similar  to 3/27/2020 and most consistent with treated disease.     5. Generalized diffuse FDG uptake to the esophagus and small hiatal  hernia, most consistent with inflammation/esophagitis.      ASSESSMENT AND PLAN:   1.  History of stage IIB, T2 N1 M0 invasive lobular carcinoma of the right breast.  It was initially diagnosed in 2006 and at that time it was hormone receptor positive, HER-2 negative.  She was treated on ECOG 2104 protocol with dose-dense Adriamycin, Cytoxan and Avastin x4 cycles  followed by Taxol and Avastin x4 cycles followed by a Avastin for 1 year.  She also received radiation to the right breast which she completed in January 2007 (5040 cGy).  She took Aromasin from 2007 to 2014.    Now she has metastatic breast cancer with extensive involvement of the bones.  There is also a left lower lobe hypermetabolic lesion and mediastinal lymph nodes which are hypermetabolic.  The biopsy from the right iliac bone is consistent with metastatic lobular breast cancer but it is hormone receptor and HER-2 negative and PDL 1 is also negative.    She has received 3000 cGy of palliative radiation to T12-L5 from 9/6/2019 till 9/18/2019.    She was started on palliative Xeloda but she was unable to tolerate even the dose reduced medication.        We decided on repeating scans and MRI brain on 10/25/2019 showed no intracranial metastatic disease.   Multiple enhancing calvarial lesions may be increased in number and are suggestive of metastatic disease. Thinner imaging on the current study may identify the smaller lesions and may be responsible for identified more lesions.   There is a single focus of T2 hyperintense signal within the anterior right temporal lobe may represent interval demyelination. There is no evidence for active demyelination.    PET/CT on 11/1/2019 showed favorable response to therapy and Overall FDG uptake within scattered osseous metastases is decreased since 8/30/2019, particularly within the pelvis. Increased sclerotic appearance of L1 and L4 pathologic compression deformities, likely sequela of recently completed palliative radiation therapy.    No significant FDG uptake in previously demonstrated hypermetabolic mediastinal lymph nodes. Biopsy negative on 9/5/2019.  There is also decreased FDG uptake in the left lower lobe (biopsy negative for malignancy), now with dense consolidation containing air bronchograms suggestive of infection versus aspiration.  There is diffuse FDG  uptake within the esophagus, consistent with esophagitis.    Because of intolerance to Xeloda we decided on switching to weekly paclitaxel on 11/5/2019.    For better tolerance we decreased the dose of paclitaxel to 70 mg/m  with cycle #2.  She has completed 3 cycles of Taxol and the last chemotherapy was on 1/14/2020.      PET/CT on 1/24/2020 showed progression of the disease in some of the bone lesions including increase in size and lytic lesion within the vertebral body of C4 measuring 1 cm as opposed to 0.6 cm previously and a new central soft tissue nodule with SUV max 4.1 when previously it was non-hypermetabolic.  There is also some progression noted in the right proximal femur and right iliac bone.  There is decreased metabolic uptake in the right lamina of T9 with SUV max 4.7 when previously it was 8.0.  Other lesions are stable.    There are no pathologically enlarged mediastinal, hilar or axillary lymph nodes. Calcified subcarinal lymph nodes. There are no suspicious lung nodules or evidence for infection and previous left lower lobe consolidation has resolved.     CA 27-29 also had increased significantly and it was 257 on 1/28/2020.    Due to progression we switched to eribulin on 1/28/2020.        After 3 cycles, she had a repeat PET/CT on 3/27/2020 which showed a stable size of the bone metastatic lesions although the FDG avidity was less, consistent with treatment response.    She had MRI of the thoracic spine on 4/3/2020 and it was compared to the one which was done in August 2019 and it showed increased size of several metastatic lesions most notably at T9 but also at T5-T7.  Other lesions at T10, T11 and T12 appeared improved.    CA 27-29 was also down to 222 on 3/18/2020.    I believe overall this is consistent with a partial response to the treatment.  Overall she is tolerating eribulin well with some worsening of neuropathy and cytopenias.     We decided on continuing the chemotherapy with some  modifications and she got cycle #4 with slightly decreased dose of eribulin to 1.2 mg/m2.      She has received 8 cycles of eribulin up until now and repeat PET CT on 7/17/2020 showed only minimally increased FDG uptake in T9 and T10 and in the left anterior fifth rib near the costochondral junction.  That is slightly decreased FDG avidity in the right femoral neck lesion.  Otherwise bone disease is a stable.  No other evidence of metastatic disease is found.    CA 27-29 on 6/30/2020 was 308.    Overall she is doing well and is tolerating treatments well.  Overall her quality of life is decent.  We discussed the situation in detail.  I believe that she has is stable to only minimally progressive disease and it would be reasonable to continue the same treatment for now as overall it seems to be keeping cancer in check while maintaining decent quality of life.  I would recommend starting cycle #9 today.        Recently FDA approved sacituzumab govitecan-hziy (Trodelvy) for TNBC, based on the results of the phase II IMMU-132-01 clinical trial. So this might become an option for her in the future.       Bilateral pulmonary emboli.  These were incidentally found on 7/17/2020.  She has been asymptomatic.  She has been started on Lovenox.  I recommend switching to Eliquis.  She will finish of the current supply of Lovenox which is for about 10 more days and then switch to Eliquis.  I will give her 5 mg twice a day of Eliquis she will likely need indefinite treatment with systemic anticoagulation.      Neuropathy.  Overall neuropathy has stabilized after decreasing the dose of eribulin.  Continue to observe.  From the underlying multiple sclerosis she has chronic balance issues and heat and cold sensitivity.        Pain management.  This is under decent control with morphine and she is following with palliative care.      Previously she got palliative radiation to T12-L5 3000 cGy in 10 fractions from 9/6/2019 till  9/18/2019.  She was evaluated by orthopedics Dr. Bipin Elliott on 11/1/2019 and conservative management was recommended.      Bone metastasis.  She received Zometa on 10/23/2019 and 01/14/2020 and 4/7/2020 and 6/30/2020.  She is getting it every 3 months.  Continue calcium and vitamin D.     Insomnia.  I advised starting trazodone 50 mg at night.  I told her not to take lorazepam with it.    We did not address the following today.      Vision changes.    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  It was thought that this could be due to Taxol as patient reported to the ophthalmologist that she was on Taxol in September 2019 when her symptoms started.  But she was not on Taxol in September 2019 when she started noticing the visual changes so Taxol is not responsible for this.  The first dose of Taxol was on 11/5/2019.   Her vision is much better now.  She will continue to follow closely with ophthalmology.       Increased FDG ability in the colon.  She had FDG uptake in the cecum and ascending colon but no corresponding lesion was seen on the CT scan.  She is completely asymptomatic so at this time we will continue to observe.    Fatigue.  This is from the cancer and chemotherapy.  I again encouraged her to do regular exercise.  Continue to follow with cancer rehab.        Discussion regarding healthcare directives.  On previous visit she told me that she will bring the health care directive for our records but she forgot so I told her to bring it on next visit.      Breast implant removal.  She tells me that she was planning to do breast implant removal because there was a recall on this.  Her surgery was scheduled for 9/24/2019.  Because of this new development of metastatic breast cancer and her recent radiation, surgery has been canceled.  We discussed that at this time treatment for metastatic breast cancer would take precedence over the other surgery as the other  surgery would require her to be off chemotherapy for several weeks and in that case the chances of cancer progression would be high and I would not recommend that.    Multiple sclerosis.  She will continue to follow with her neurologist Dr. Quiles.  Currently multiple sclerosis is under control and currently she is not taking any medications.    RTC in 3 weeks    All questions answered.  She is agreeable and comfortable with the plan.        Dewayne Zepeda MD    Video start time. 11:52 AM  Video stop time. 12:07 PM

## 2020-07-21 NOTE — NURSING NOTE
"Shaneka Alvarez is a 60 year old female who is being evaluated via a billable video visit.      The patient has been notified of following:     \"This video visit will be conducted via a call between you and your physician/provider. We have found that certain health care needs can be provided without the need for an in-person physical exam.  This service lets us provide the care you need with a video conversation.  If a prescription is necessary we can send it directly to your pharmacy.  If lab work is needed we can place an order for that and you can then stop by our lab to have the test done at a later time.    Video visits are billed at different rates depending on your insurance coverage.  Please reach out to your insurance provider with any questions.    If during the course of the call the physician/provider feels a video visit is not appropriate, you will not be charged for this service.\"    Patient has given verbal consent for Video visit? Yes  How would you like to obtain your AVS? MyChart  If you are dropped from the video visit, the video invite should be resent to: MyChart  Will anyone else be joining your video visit? No        Video-Visit Details    Type of service:  Video Visit    Originating Location (pt. Location): Other in clinic prior to tx    Distant Location (provider location):  [facility]     [facility]      Patient has had trouble falling asleep - has tried the lorazepam and morphine with no relief    Eliza Thakur CMA    ;  "

## 2020-07-21 NOTE — PROGRESS NOTES
Port needle-one inch- left accessed for treatment. Tolerated port access and draw without complaint. Port site scrubbed with Chloraprep for 30 seconds and allowed to dry completely prior to Opsite dressing application. Accessed using sterile technique. Bixby tubes drawn-Red gel/Green/Purple tubes. Double signed by patient and RN. See documentation flowsheet. Mariah Paniagua, RN, BSN, OCN

## 2020-07-21 NOTE — PROGRESS NOTES
Infusion Nursing Note:  Shaneka Alvarez presents today for C9D1.    Patient seen by provider today: Yes: Dr. Zepeda   present during visit today: Not Applicable.    Note: Pt admits to tolerating chemotherapy without many difficulties although recently diagnosed with blood clots in her lungs, placed on blood thinners last weekend.  See flow sheet for assessment.    Intravenous Access:  Implanted Port.    Treatment Conditions:  Lab Results   Component Value Date    HGB 11.9 07/21/2020     Lab Results   Component Value Date    WBC 3.5 07/21/2020      Lab Results   Component Value Date    ANEU 2.2 07/21/2020     Lab Results   Component Value Date     07/21/2020      Results reviewed, labs MET treatment parameters, ok to proceed with treatment.      Post Infusion Assessment:  Patient tolerated infusion without incident.  Blood return noted pre and post infusion.  Site patent and intact, free from redness, edema or discomfort.  No evidence of extravasations.  Access discontinued per protocol.       Discharge Plan:   Patient discharged in stable condition accompanied by: self.  Departure Mode: Ambulatory.  Pt will RTC 7/28/20 for C9D8 Eribulin.    Javier Roland RN

## 2020-07-21 NOTE — PATIENT INSTRUCTIONS
Chemo today and next week    Cont lovenox twice daily.  Once lovenox is finished then start Eliquis 5 mg twice daily    Start Trazodone 50 mg at night as needed for sleep but do not take Lorazepam at night with it    See me back in 3 weeks- chemo to follow

## 2020-07-21 NOTE — LETTER
7/21/2020         RE: Shaneka Alvarez  [address] [facility] [address]        Dear Colleague,    Thank you for referring your patient, Shaneka Alvarez, to the [facility]. Please see a copy of my visit note below.      ONCOLOGY FOLLOW UP NOTE: 7/21/2020        I took the history from reviewing the previous notes that she was following with Dr. Amaya.  I have copied and updated from prior notes.    ONCOLOGY History:    1.  January 2006:  Diagnosed with Stage IIB, T2 N1 M0 invasive lobular carcinoma of the right breast.  Final pathology showed a 4.5 x 3.8 x 2.5 cm, 01/14 lymph nodes positive.  Estrogen, progesterone receptor positive, HER-2 negative.     2.  Genetics.  BRCA1 and 2 mutations not detected. 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       THERAPY TO DATE:   1. 2006:  ECOG 2104 protocol of dose-dense Adriamycin, Cytoxan and Avastin x4 cycles followed by Taxol and Avastin x4 cycles.   2.  03/2007:  Completed 1 year Avastin.   3.  11/2006-01/2007:  Radiotherapy to the right breast of 5040 cGy.   4.  [id number]:  Aromasin.  Stopped after moving to the [facility].   5.  01/2016:  Right modified radical mastectomy with latissimus dorsi flap reconstruction and a left prophylactic mastectomy with latissimus dorsi flap reconstruction.     She recently had MRI of the brain as a follow-up for multiple sclerosis and there were multiple calvarial lesions noted which was suspicious for metastatic disease.  There was no evidence of new multiple sclerosis lesions.  She had a PET scan on 8/30/2019 which showed widespread bone metastatic lesions (calvarium, spine, sacrum, pelvis, ribs most prominent at C7, T3, L1 and L4), hypermetabolic 3 cm lesion in LLL, and mediastinal/hilar LN. CEA wnl at 1.9 and C27-29 elevated to 415 (28 on 8/23/16). A CT guided biopsy of the right iliac bone was obtained on 9/4/19, pathology consistent with metastatic adenocarcinoma (breast), ER/NJ negative,  HER-2 negative PDL 1 < 1%. A second biopsy was take of the LLL nodule via EBUS on 9/5/19 did not show any malignancy.    C/T/L spine MRI 8/3/19 to 9/2/19 with numerous enhancing lesions of the C, T and L spine, largest around T9 and L1. No evidence of cord compression. Has a L1 compression fracture and impending L4.     Received radiation T12-L5 3000 cGy in 10 fractions from 9/6/2019 till 9/18/2019.  Neurosurgery recommended no surgical intervention but to wear Alger brace when out of bed and HOB >30 degrees    She started palliative Xeloda 2000/1500 on 10/1/2019 but after just a few doses she noticed nausea, red eyes blurred vision, loss of appetite.  She stopped taking it after 5 doses and was seen by nurse practitioner on 10/7/2019 when she was improving.  At that point she was restarted on Xeloda at a lower dose of 1000 mg twice a day.  As she was not able to tolerate even the lower dose with extreme nausea and vomiting and feeling extremely fatigued and blurry vision, we decided on stopping Xeloda.    We decided on repeating scans and MRI brain on 10/25/2019 showed no intracranial metastatic disease.   Multiple enhancing calvarial lesions may be increased in number and are suggestive of metastatic disease. Thinner imaging on the current study may identify the smaller lesions and may be responsible for  identified more lesions.   There is a single focus of T2 hyperintense signal within the anterior right temporal lobe may represent interval demyelination. There is no evidence for active demyelination.    PET/CT on 11/1/2019 showed favorable response to therapy and Overall FDG uptake within scattered osseous metastases is decreased since 8/30/2019, particularly within the pelvis. Increased sclerotic appearance of L1 and L4 pathologic compression deformities, likely sequela of recently completed palliative radiation therapy.    No significant FDG uptake in previously demonstrated hypermetabolic mediastinal lymph  nodes. Biopsy negative on 9/5/2019.  There is also decreased FDG uptake in the left lower lobe (biopsy negative for  malignancy), now with dense consolidation containing air bronchograms suggestive of infection versus aspiration.  There is diffuse FDG uptake within the esophagus, consistent with esophagitis.    Because of intolerance to Xeloda we decided on switching to weekly paclitaxel on 11/5/2019.    C#2 Taxol 12/4/19- started with 70mg/m2 dose reduction    C#3 Taxol 12/30/2019     PET/CT on 1/24/2020 showed progression of the disease in some of the bone lesions including increase in size and lytic lesion within the vertebral body of C4 measuring 1 cm as opposed to 0.6 cm previously and a new central soft tissue nodule with SUV max 4.1 when previously it was non-hypermetabolic.  There is also some progression noted in the right proximal femur and right iliac bone.  There is decreased metabolic uptake in the right lamina of T9 with SUV max 4.7 when previously it was 8.0.  Other lesions are stable.    CA 27-29 also had increased significantly and it was 257 on 1/28/2020.    We decided on switching to eribulin on 1/28/2020.    C#2 2/18/2020  C#2 D#8 2/25/2020    C#3 D#1 3/18/2020 -delayed by 1 week as per patient's preference because she wanted to visit her family.    She had a repeat PET/CT on 3/27/2020 which showed stable size of the bone metastatic lesions although the FDG avidity was less, consistent with treatment response.    She had MRI of the thoracic spine on 4/3/2020 and it was compared to the one which was done in August 2019 and it showed increased size of several metastatic lesions most notably at T9 but also at T5-T7.  Other lesions at T10, T11 and T12 appeared improved.    CA 27-29 was also down to 222 on 3/18/2020.    Cycle #4 eribulin ( dose reduced to 1.2 mg/m2 )-4/7/2020-   Cycle #5 Eribulin ( 1.2 mg/m2 )- 4/28/2020   Cycle #6 Eribulin ( 1.2 mg/m2 )- 5/19/2020     Cycle #7 Eribulin ( 1.2 mg/m2 )-  6/9/2020    Cycle #8 Eribulin ( 1.2 mg/m2 )- 6/30/2020     She had a repeat PET scan on 7/17/2020 which showed incidental finding of new acute bilateral pulmonary embolism in the distal left main pulmonary artery extending in the left upper and lower lobes and in the segmental and branch arteries in the right lower lobe.    It also showed mildly increased FDG avidity in the lytic lesion in the T9 lamina and right pedicle with SUV max 5.3, previously 3.9.  That is also slightly increased FDG uptake to the left anterior fifth rib at the costochondral junction SUV max 3.9, previously 2.5.  There is slightly decreased FDG uptake in the right femoral neck lesion SUV max 2.2, previously 2.9.  FDG uptake in other bone lesions is fairly stable.  No new metastasis are seen.    CA 27-29 was 308 on 6/30/2020.  It was 286 on 5/19/2020.    Overall this is consistent with only slight progression versus stable disease.    She was started on Lovenox.    Cycle #9 eribulin 7/21/2020.        I had also recommended starting Zometa so she got dental clearance to start with that.  She received Zometa on 10/23/2019 and 1/14/2020 and 4/7/2020 on a 3 monthly schedule.      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  It was thought that this could be due to Taxol as patient reported to the ophthalmologist that she was on Taxol in September 2019 when her symptoms started.  But she was not on Taxol in September 2019 when she started noticing the visual changes so Taxol is not responsible for this.  The first dose of Taxol was on 11/5/2019.       Interval history.  This is a video visit.  She feels well.  She was seen in the emergency room for acute pulmonary embolism although she was asymptomatic and she has been taking Lovenox injections.  She denies any shortness of breath or chest pain.  Overall her pain from the cancer is under good control with morphine and she takes it once 2 times a day.  Denies any  significant nausea or vomiting.  No diarrhea or constipation.  She mentions that the neuropathy is the same.  It has not worsened.  Denies new swellings.  Energy is fair.  She thinks vision is improving.  Overall she is very pleased with how treatments are going.  She has problem falling asleep and she uses lorazepam at night but it is not helping.  In the past she has used trazodone which has helped.  She only is using lorazepam for sleeping otherwise she does not need it as per her.        ECOG 1    ROS:  A comprehensive ROS was otherwise neg        I reviewed other history in epic as below.       PAST MEDICAL HISTORY:     1.  Breast cancer  2.  Multiple sclerosis.   3.  Depression.   4.  Migraines.   5.  Hypertension.   6.  Cholecystectomy and umbilical hernia repair.     SOCIAL HISTORY: She smoked for 5 years but quit many years ago.  Drinks alcohol socially.  She lives with her .  She is a teacher in middle school.       FAMILY HISTORY: She mentions that her mother had breast cancer at 49.  Both grandmothers had breast cancer but she is not sure of the age.  A couple of cousins have cancers.  Patient has 3 children who are healthy.    Current Outpatient Medications   Medication     acetaminophen (TYLENOL) 500 MG tablet     busPIRone (BUSPAR) 15 MG tablet     calcium citrate-vitamin D (CITRACAL) 315-250 MG-UNIT TABS     Cholecalciferol (VITAMIN D3 PO)     cyclopentolate (CYCLOGYL) 1 % ophthalmic solution     diclofenac (VOLTAREN) 1 % topical gel     enoxaparin ANTICOAGULANT (LOVENOX) 60 MG/0.6ML syringe     ibuprofen (ADVIL/MOTRIN) 600 MG tablet     LORazepam (ATIVAN) 0.5 MG tablet     magnesium 250 MG tablet     morphine (MS CONTIN) 15 MG CR tablet     morphine (MSIR) 15 MG IR tablet     naloxone (NARCAN) 1 mg/mL for intranasal kit (2 syringes with 2 mucosal atomizer device)     polyethylene glycol (MIRALAX/GLYCOLAX) packet     prochlorperazine (COMPAZINE) 10 MG tablet     propranolol (INDERAL LA) 60 MG  "24 hr capsule     senna-docusate (SENOKOT-S/PERICOLACE) 8.6-50 MG tablet     syringe/needle, disp, (B-D INTEGRA SYRINGE) 23G X 1\" 3 ML MISC     traZODone (DESYREL) 50 MG tablet     venlafaxine (EFFEXOR-ER) 225 MG TB24 24 hr tablet     No current facility-administered medications for this visit.      Facility-Administered Medications Ordered in Other Visits   Medication     heparin 100 UNIT/ML injection 5 mL     sodium chloride (PF) 0.9% PF flush 10-20 mL        Allergies   Allergen Reactions     Bactrim [Sulfamethoxazole W/Trimethoprim]      Internal bleeding     Copaxone [Glatiramer] Hives          PHYSICAL EXAMINATION:     There were no vitals taken for this visit.  Wt Readings from Last 4 Encounters:   07/17/20 59.9 kg (132 lb)   07/07/20 59 kg (130 lb)   06/30/20 58.5 kg (129 lb)   06/16/20 59.7 kg (131 lb 9.6 oz)         Constitutional.  Looks well and in no apparent distress.   Eyes.  Without eye redness or apparent jaundice.   Respiratory.  Non labored breathing. Speaking in full sentences.    Skin.  No concerning skin rashes on the skin visualized.   Neurological.  Is alert and oriented.  Psychiatric.  Mood and affect seem appropriate.      The rest of a comprehensive physical examination is deferred due to Public Health Emergency video visit restrictions.    Labs and Imaging.    Reviewed    Combined Report of:    PET and CT on  7/17/2020 3:52 PM :     1. PET of the neck, chest, abdomen, and pelvis.  2. PET CT Fusion for Attenuation Correction and Anatomical  Localization:    3. Diagnostic CT scan of the chest, abdomen, and pelvis with  intravenous contrast for interpretation.  3. CT of the chest, abdomen and pelvis obtained for diagnostic  interpretation.  4. 3D MIP and PET-CT fused images were processed on an independent  workstation and archived to PACS and reviewed by a radiologist.     Technique:     1. PET: The patient received 13.72 mCi of F-18-FDG; the serum glucose  was 97 prior to administration, " body weight was 59 kg. Images were  evaluated in the axial, sagittal, and coronal planes as well as the  rotational whole body MIP. Images were acquired from the Vertex to the  Feet.     UPTAKE WAS MEASURED AT 60 MINUTES.      BACKGROUND:  Liver SUV max= 3.5,   Aorta Blood SUV Max: 2.6.      2. CT: Volumetric acquisition for clinical interpretation of the  chest, abdomen, and pelvis acquired at 3 mm sections after the  uneventful administration of intravenous contrast. The chest, abdomen,  and pelvis were evaluated at 5 mm sections in bone, soft tissue, and  lung windows.       The patient received 80 cc. Of Isovue 370 intravenously and 500 mL  oral Breeza contrast for the examination.       3. 3D MIP and PET-CT fused images were processed on an independent  workstation and archived to PACS and reviewed by a radiologist.     INDICATION: Metastatic breast cancer     ADDITIONAL INFORMATION OBTAINED FROM EMR: 60-year-old woman with  history of invasive lobular carcinoma of the right breast diagnosed in  2006 treated with chemoradiation and hormonal therapy. Underwent  bilateral mastectomy 2016. Radiotherapy to the lumbar spine T12-L5 in  September 2019. Currently undergoing chemotherapy with Eribulin.     COMPARISON: MRI thoracic spine 4/3/2020, PET-CT 3/27/2020, MRI  cervical spine 1/31/2020, PET-CT 1/24/2020     FINDINGS:      HEAD/NECK:  There is no suspicious FDG uptake in the neck. Symmetric FDG uptake to  the aryepiglottic folds is favored to be inflammatory in etiology.     Trace mucosal thickening in the right maxillary sinus. The remaining  paranasal sinuses are relatively clear. The mastoid air cells are  clear. No masses, mass effect or pathologically enlarged lymph nodes  are evident. Small calcifications in or adjacent to the right palatine  tonsil are similar to prior and suggest sequela of prior inflammation,  versus less likely sialoliths. No submandibular ductal ectasia. The  thyroid gland is  unremarkable.     CHEST:  There is no suspicious FDG uptake in the chest. Generalized FDG uptake  throughout the esophagus is similar to prior, for example in the the  distal esophagus/small hiatal hernia with SUV max of 5.7 (series 5,  image 202).     The central tracheobronchial tree is patent. No pleural effusion or  pneumothorax. No focal consolidation. Heart size is within normal  limits. No pericardial effusion. Filling defects within the distal  main left pulmonary artery extending into the left lower lobe  segmental and branch arteries, the proximal left upper lobe segmental  arteries, as well as additional filling defects within the right lower  lobe segmental branch arteries. These are new since 3/27/2020, and are  consistent with pulmonary emboli. No enlarged or hypermetabolic  mediastinal, hilar, or axillary lymph nodes. Postsurgical changes of  right axillary lymph node dissection. Post surgical changes of  bilateral mastectomy with implant reconstruction.     ABDOMEN AND PELVIS:  There is no suspicious FDG uptake in the abdomen or pelvis.     The liver, pancreas, spleen, and adrenal glands are unremarkable. The  gallbladder is surgically absent. No intrahepatic or extra hepatic  biliary ductal dilatation. Symmetric nephrographic enhancement. No  hydronephrosis. Bladder and uterus are unremarkable. Normal caliber of  the small and large bowel. No fluid collection or free fluid. No  enlarged or hypermetabolic lymph nodes in the abdomen or pelvis.     LOWER EXTREMITIES:   No abnormal masses or hypermetabolic soft tissue lesions.     BONES:   Increased FDG uptake to the lytic lesion in the T9 lamina and T10  right pedicle, with SUV max 5.3 (series 5, image 206), previously 3.9.        Increased FDG uptake to the left anterior fifth rib at the  costochondral junction, SUV max 3.9 (series 5, image 211), previously  2.5. Additional chronic bilateral rib fractures with mild FDG uptake  are similar to  prior.     Additional scattered lytic lesions in the calvarium, spine, pelvis,  and right femoral neck are similar to prior and demonstrate only the  levels of FDG uptake. For example (series 5):  - Image 110: Left side of C4 vertebral body, SUV max 2.7, previously  2.7.  - Image 328: Right iliac crest SUV max 2.7, previously 2.6.  - Image 341: RIght iliac crest SUV max 2.1, previously 2.2.  - Image 379: Right femoral neck SUV max 2.2, previously 2.9.     Multilevel degenerative changes in the spine. Unchanged severe  compression deformity of the L1 vertebral body with greater than 75%  central height loss. Relative photopenia to the T12-L5 vertebral  bodies on PET images corresponds with history of radiotherapy.                                                                         IMPRESSION: In this patient with a history of metastatic breast cancer  status post chemotherapy, radiotherapy, and bilateral mastectomies:     1. New acute bilateral pulmonary emboli in the distal left main  pulmonary artery extending in the left upper and lower lobes, and in  the segmental and branch arteries in the right lower lobe.     2. Increased FDG uptake to the lytic lesion in the T9-10 posterior  elements concerning for progression of viable tumor.     3. Increased FDG uptake to the anterior left 5th rib at the  costochondral junction, indeterminant. Differential diagnosis includes  degenerative uptake, posttraumatic changes, and progressive  metastasis. Trauma is felt less likely given absence of traumatic  findings on CT, although multiple additional chronic rib fractures are  seen.     4. Additional lytic lesions with low levels of FDG uptake are similar  to 3/27/2020 and most consistent with treated disease.     5. Generalized diffuse FDG uptake to the esophagus and small hiatal  hernia, most consistent with inflammation/esophagitis.      ASSESSMENT AND PLAN:   1.  History of stage IIB, T2 N1 M0 invasive lobular carcinoma  of the right breast.  It was initially diagnosed in 2006 and at that time it was hormone receptor positive, HER-2 negative.  She was treated on ECOG 2104 protocol with dose-dense Adriamycin, Cytoxan and Avastin x4 cycles followed by Taxol and Avastin x4 cycles followed by a Avastin for 1 year.  She also received radiation to the right breast which she completed in January 2007 (5040 cGy).  She took Aromasin from 2007 to 2014.    Now she has metastatic breast cancer with extensive involvement of the bones.  There is also a left lower lobe hypermetabolic lesion and mediastinal lymph nodes which are hypermetabolic.  The biopsy from the right iliac bone is consistent with metastatic lobular breast cancer but it is hormone receptor and HER-2 negative and PDL 1 is also negative.    She has received 3000 cGy of palliative radiation to T12-L5 from 9/6/2019 till 9/18/2019.    She was started on palliative Xeloda but she was unable to tolerate even the dose reduced medication.        We decided on repeating scans and MRI brain on 10/25/2019 showed no intracranial metastatic disease.   Multiple enhancing calvarial lesions may be increased in number and are suggestive of metastatic disease. Thinner imaging on the current study may identify the smaller lesions and may be responsible for identified more lesions.   There is a single focus of T2 hyperintense signal within the anterior right temporal lobe may represent interval demyelination. There is no evidence for active demyelination.    PET/CT on 11/1/2019 showed favorable response to therapy and Overall FDG uptake within scattered osseous metastases is decreased since 8/30/2019, particularly within the pelvis. Increased sclerotic appearance of L1 and L4 pathologic compression deformities, likely sequela of recently completed palliative radiation therapy.    No significant FDG uptake in previously demonstrated hypermetabolic mediastinal lymph nodes. Biopsy negative on 9/5/2019.   There is also decreased FDG uptake in the left lower lobe (biopsy negative for malignancy), now with dense consolidation containing air bronchograms suggestive of infection versus aspiration.  There is diffuse FDG uptake within the esophagus, consistent with esophagitis.    Because of intolerance to Xeloda we decided on switching to weekly paclitaxel on 11/5/2019.    For better tolerance we decreased the dose of paclitaxel to 70 mg/m  with cycle #2.  She has completed 3 cycles of Taxol and the last chemotherapy was on 1/14/2020.      PET/CT on 1/24/2020 showed progression of the disease in some of the bone lesions including increase in size and lytic lesion within the vertebral body of C4 measuring 1 cm as opposed to 0.6 cm previously and a new central soft tissue nodule with SUV max 4.1 when previously it was non-hypermetabolic.  There is also some progression noted in the right proximal femur and right iliac bone.  There is decreased metabolic uptake in the right lamina of T9 with SUV max 4.7 when previously it was 8.0.  Other lesions are stable.    There are no pathologically enlarged mediastinal, hilar or axillary lymph nodes. Calcified subcarinal lymph nodes. There are no suspicious lung nodules or evidence for infection and previous left lower lobe consolidation has resolved.     CA 27-29 also had increased significantly and it was 257 on 1/28/2020.    Due to progression we switched to eribulin on 1/28/2020.        After 3 cycles, she had a repeat PET/CT on 3/27/2020 which showed a stable size of the bone metastatic lesions although the FDG avidity was less, consistent with treatment response.    She had MRI of the thoracic spine on 4/3/2020 and it was compared to the one which was done in August 2019 and it showed increased size of several metastatic lesions most notably at T9 but also at T5-T7.  Other lesions at T10, T11 and T12 appeared improved.    CA 27-29 was also down to 222 on 3/18/2020.    I believe  overall this is consistent with a partial response to the treatment.  Overall she is tolerating eribulin well with some worsening of neuropathy and cytopenias.     We decided on continuing the chemotherapy with some modifications and she got cycle #4 with slightly decreased dose of eribulin to 1.2 mg/m2.      She has received 8 cycles of eribulin up until now and repeat PET CT on 7/17/2020 showed only minimally increased FDG uptake in T9 and T10 and in the left anterior fifth rib near the costochondral junction.  That is slightly decreased FDG avidity in the right femoral neck lesion.  Otherwise bone disease is a stable.  No other evidence of metastatic disease is found.    CA 27-29 on 6/30/2020 was 308.    Overall she is doing well and is tolerating treatments well.  Overall her quality of life is decent.  We discussed the situation in detail.  I believe that she has is stable to only minimally progressive disease and it would be reasonable to continue the same treatment for now as overall it seems to be keeping cancer in check while maintaining decent quality of life.  I would recommend starting cycle #9 today.        Recently FDA approved sacituzumab govitecan-hziy (Trodelvy) for TNBC, based on the results of the phase II IMMU-132-01 clinical trial. So this might become an option for her in the future.       Bilateral pulmonary emboli.  These were incidentally found on 7/17/2020.  She has been asymptomatic.  She has been started on Lovenox.  I recommend switching to Eliquis.  She will finish of the current supply of Lovenox which is for about 10 more days and then switch to Eliquis.  I will give her 5 mg twice a day of Eliquis she will likely need indefinite treatment with systemic anticoagulation.      Neuropathy.  Overall neuropathy has stabilized after decreasing the dose of eribulin.  Continue to observe.  From the underlying multiple sclerosis she has chronic balance issues and heat and cold sensitivity.         Pain management.  This is under decent control with morphine and she is following with palliative care.      Previously she got palliative radiation to T12-L5 3000 cGy in 10 fractions from 9/6/2019 till 9/18/2019.  She was evaluated by orthopedics Dr. Bipin Elliott on 11/1/2019 and conservative management was recommended.      Bone metastasis.  She received Zometa on 10/23/2019 and 01/14/2020 and 4/7/2020 and 6/30/2020.  She is getting it every 3 months.  Continue calcium and vitamin D.     Insomnia.  I advised starting trazodone 50 mg at night.  I told her not to take lorazepam with it.    We did not address the following today.      Vision changes.    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  It was thought that this could be due to Taxol as patient reported to the ophthalmologist that she was on Taxol in September 2019 when her symptoms started.  But she was not on Taxol in September 2019 when she started noticing the visual changes so Taxol is not responsible for this.  The first dose of Taxol was on 11/5/2019.   Her vision is much better now.  She will continue to follow closely with ophthalmology.       Increased FDG ability in the colon.  She had FDG uptake in the cecum and ascending colon but no corresponding lesion was seen on the CT scan.  She is completely asymptomatic so at this time we will continue to observe.    Fatigue.  This is from the cancer and chemotherapy.  I again encouraged her to do regular exercise.  Continue to follow with cancer rehab.        Discussion regarding healthcare directives.  On previous visit she told me that she will bring the health care directive for our records but she forgot so I told her to bring it on next visit.      Breast implant removal.  She tells me that she was planning to do breast implant removal because there was a recall on this.  Her surgery was scheduled for 9/24/2019.  Because of this new development of  metastatic breast cancer and her recent radiation, surgery has been canceled.  We discussed that at this time treatment for metastatic breast cancer would take precedence over the other surgery as the other surgery would require her to be off chemotherapy for several weeks and in that case the chances of cancer progression would be high and I would not recommend that.    Multiple sclerosis.  She will continue to follow with her neurologist Dr. Quiles.  Currently multiple sclerosis is under control and currently she is not taking any medications.    RTC in 3 weeks    All questions answered.  She is agreeable and comfortable with the plan.        Dewayne Zepeda MD    Video start time. 11:52 AM  Video stop time. 12:07 PM      Again, thank you for allowing me to participate in the care of your patient.        Sincerely,        Dewayne Zepeda MD

## 2020-07-27 ENCOUNTER — PATIENT OUTREACH (OUTPATIENT)
Dept: ONCOLOGY | Facility: CLINIC | Age: 60
End: 2020-07-27

## 2020-07-27 NOTE — PROGRESS NOTES
Patient called stating she has been having nausea and vomiting since yesterday afternoon.  She had cycle #9 of Eibulin on 7/21 and returning for day 8 on 7/28.  She states she only felt nauseated one time before while on this regimen, but at that she wasn't sure.  She took Compazine,which she thought helped her somewhat.  She states she felt slightly better when I spoke with her an hour after she left writer a message.  Advised to use Compazine every 6 hours and to try and take small amounts of fluid.  Scheduled to check in with Annie tomorrow before her appointment.

## 2020-07-28 ENCOUNTER — ONCOLOGY VISIT (OUTPATIENT)
Dept: ONCOLOGY | Facility: CLINIC | Age: 60
End: 2020-07-28
Payer: COMMERCIAL

## 2020-07-28 ENCOUNTER — INFUSION THERAPY VISIT (OUTPATIENT)
Dept: INFUSION THERAPY | Facility: CLINIC | Age: 60
End: 2020-07-28
Payer: COMMERCIAL

## 2020-07-28 ENCOUNTER — ALLIED HEALTH/NURSE VISIT (OUTPATIENT)
Dept: ONCOLOGY | Facility: CLINIC | Age: 60
End: 2020-07-28
Payer: COMMERCIAL

## 2020-07-28 VITALS
TEMPERATURE: 98.6 F | OXYGEN SATURATION: 98 % | DIASTOLIC BLOOD PRESSURE: 72 MMHG | HEART RATE: 110 BPM | SYSTOLIC BLOOD PRESSURE: 102 MMHG | WEIGHT: 122.5 LBS | BODY MASS INDEX: 22.41 KG/M2

## 2020-07-28 DIAGNOSIS — C50.912 BILATERAL MALIGNANT NEOPLASM OF BREAST IN FEMALE, UNSPECIFIED ESTROGEN RECEPTOR STATUS, UNSPECIFIED SITE OF BREAST (H): ICD-10-CM

## 2020-07-28 DIAGNOSIS — C50.919 METASTATIC BREAST CANCER: ICD-10-CM

## 2020-07-28 DIAGNOSIS — F41.9 ANXIETY AND DEPRESSION: ICD-10-CM

## 2020-07-28 DIAGNOSIS — E87.6 HYPOKALEMIA: ICD-10-CM

## 2020-07-28 DIAGNOSIS — R63.0 LOSS OF APPETITE: ICD-10-CM

## 2020-07-28 DIAGNOSIS — C50.919 METASTATIC BREAST CANCER: Primary | ICD-10-CM

## 2020-07-28 DIAGNOSIS — G35 MULTIPLE SCLEROSIS (H): ICD-10-CM

## 2020-07-28 DIAGNOSIS — C79.51 BONE METASTASIS: ICD-10-CM

## 2020-07-28 DIAGNOSIS — C50.911 BILATERAL MALIGNANT NEOPLASM OF BREAST IN FEMALE, UNSPECIFIED ESTROGEN RECEPTOR STATUS, UNSPECIFIED SITE OF BREAST (H): ICD-10-CM

## 2020-07-28 DIAGNOSIS — E87.6 HYPOKALEMIA: Primary | ICD-10-CM

## 2020-07-28 DIAGNOSIS — R11.2 NAUSEA AND VOMITING, INTRACTABILITY OF VOMITING NOT SPECIFIED, UNSPECIFIED VOMITING TYPE: ICD-10-CM

## 2020-07-28 DIAGNOSIS — F32.A ANXIETY AND DEPRESSION: ICD-10-CM

## 2020-07-28 DIAGNOSIS — G89.3 CANCER RELATED PAIN: ICD-10-CM

## 2020-07-28 LAB
ALBUMIN SERPL-MCNC: 4.2 G/DL (ref 3.4–5)
ALP SERPL-CCNC: 84 U/L (ref 40–150)
ALT SERPL W P-5'-P-CCNC: 92 U/L (ref 0–50)
ANION GAP SERPL CALCULATED.3IONS-SCNC: 9 MMOL/L (ref 3–14)
AST SERPL W P-5'-P-CCNC: 50 U/L (ref 0–45)
BASOPHILS # BLD AUTO: 0 10E9/L (ref 0–0.2)
BASOPHILS NFR BLD AUTO: 0.5 %
BILIRUB SERPL-MCNC: 0.9 MG/DL (ref 0.2–1.3)
BUN SERPL-MCNC: 15 MG/DL (ref 7–30)
CALCIUM SERPL-MCNC: 9.5 MG/DL (ref 8.5–10.1)
CHLORIDE SERPL-SCNC: 92 MMOL/L (ref 94–109)
CO2 SERPL-SCNC: 33 MMOL/L (ref 20–32)
CREAT SERPL-MCNC: 0.96 MG/DL (ref 0.52–1.04)
DIFFERENTIAL METHOD BLD: ABNORMAL
EOSINOPHIL # BLD AUTO: 0 10E9/L (ref 0–0.7)
EOSINOPHIL NFR BLD AUTO: 0.1 %
ERYTHROCYTE [DISTWIDTH] IN BLOOD BY AUTOMATED COUNT: 16.6 % (ref 10–15)
GFR SERPL CREATININE-BSD FRML MDRD: 65 ML/MIN/{1.73_M2}
GLUCOSE SERPL-MCNC: 73 MG/DL (ref 70–99)
HCT VFR BLD AUTO: 43 % (ref 35–47)
HGB BLD-MCNC: 14.3 G/DL (ref 11.7–15.7)
IMM GRANULOCYTES # BLD: 0.1 10E9/L (ref 0–0.4)
IMM GRANULOCYTES NFR BLD: 0.9 %
LYMPHOCYTES # BLD AUTO: 0.9 10E9/L (ref 0.8–5.3)
LYMPHOCYTES NFR BLD AUTO: 11.8 %
MAGNESIUM SERPL-MCNC: 2.1 MG/DL (ref 1.6–2.3)
MCH RBC QN AUTO: 28.4 PG (ref 26.5–33)
MCHC RBC AUTO-ENTMCNC: 33.3 G/DL (ref 31.5–36.5)
MCV RBC AUTO: 86 FL (ref 78–100)
MONOCYTES # BLD AUTO: 0.6 10E9/L (ref 0–1.3)
MONOCYTES NFR BLD AUTO: 7.5 %
NEUTROPHILS # BLD AUTO: 6.2 10E9/L (ref 1.6–8.3)
NEUTROPHILS NFR BLD AUTO: 79.2 %
PLATELET # BLD AUTO: 288 10E9/L (ref 150–450)
POTASSIUM SERPL-SCNC: 2.8 MMOL/L (ref 3.4–5.3)
PROT SERPL-MCNC: 7.6 G/DL (ref 6.8–8.8)
RBC # BLD AUTO: 5.03 10E12/L (ref 3.8–5.2)
SODIUM SERPL-SCNC: 134 MMOL/L (ref 133–144)
WBC # BLD AUTO: 7.8 10E9/L (ref 4–11)

## 2020-07-28 PROCEDURE — 36593 DECLOT VASCULAR DEVICE: CPT

## 2020-07-28 PROCEDURE — 99214 OFFICE O/P EST MOD 30 MIN: CPT | Mod: 25 | Performed by: NURSE PRACTITIONER

## 2020-07-28 PROCEDURE — 96366 THER/PROPH/DIAG IV INF ADDON: CPT | Performed by: NURSE PRACTITIONER

## 2020-07-28 PROCEDURE — 83735 ASSAY OF MAGNESIUM: CPT | Performed by: INTERNAL MEDICINE

## 2020-07-28 PROCEDURE — 96367 TX/PROPH/DG ADDL SEQ IV INF: CPT | Performed by: NURSE PRACTITIONER

## 2020-07-28 PROCEDURE — 96409 CHEMO IV PUSH SNGL DRUG: CPT | Performed by: NURSE PRACTITIONER

## 2020-07-28 PROCEDURE — 80053 COMPREHEN METABOLIC PANEL: CPT | Performed by: INTERNAL MEDICINE

## 2020-07-28 PROCEDURE — 99207 ZZC NO CHARGE LOS: CPT

## 2020-07-28 PROCEDURE — 85025 COMPLETE CBC W/AUTO DIFF WBC: CPT | Performed by: INTERNAL MEDICINE

## 2020-07-28 RX ORDER — POTASSIUM CHLORIDE 750 MG/1
40 TABLET, EXTENDED RELEASE ORAL ONCE
Status: COMPLETED | OUTPATIENT
Start: 2020-07-28 | End: 2020-07-28

## 2020-07-28 RX ORDER — HEPARIN SODIUM (PORCINE) LOCK FLUSH IV SOLN 100 UNIT/ML 100 UNIT/ML
5 SOLUTION INTRAVENOUS
Status: DISCONTINUED | OUTPATIENT
Start: 2020-07-28 | End: 2020-07-28 | Stop reason: HOSPADM

## 2020-07-28 RX ADMIN — POTASSIUM CHLORIDE 40 MEQ: 750 TABLET, EXTENDED RELEASE ORAL at 12:30

## 2020-07-28 RX ADMIN — Medication 250 ML: at 12:27

## 2020-07-28 RX ADMIN — HEPARIN SODIUM (PORCINE) LOCK FLUSH IV SOLN 100 UNIT/ML 5 ML: 100 SOLUTION at 14:34

## 2020-07-28 RX ADMIN — Medication 2 MG: at 11:01

## 2020-07-28 RX ADMIN — HEPARIN SODIUM (PORCINE) LOCK FLUSH IV SOLN 100 UNIT/ML 5 ML: 100 SOLUTION at 10:40

## 2020-07-28 ASSESSMENT — PAIN SCALES - GENERAL: PAINLEVEL: NO PAIN (0)

## 2020-07-28 NOTE — PROGRESS NOTES
Oncology Follow Up Visit: July 28, 2020     Oncologist: Dr Dewayne Zepeda  PCP: Mohit Barcenas    Diagnosis: Metastatic Breast Cancer to brain and spine  Shaneka Alvarez is a 61 yo female who was diagnosed with Stage IIB, T2 N1 M0 invasive lobular carcinoma of the right breast  In January 2006.  Final pathology showed a 4.5 x 3.8 x 2.5 cm, 01/14 lymph nodes positive.  Estrogen, progesterone receptor positive, HER-2 negative.     Genetics- BRCA1 and 2 mutations not detected. 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  There was no evidence of new multiple sclerosis lesions.  PET scan on 8/30/2019 which showed widespread bone metastatic lesions (calvarium, spine, sacrum, pelvis, ribs most prominent at C7, T3, L1 and L4), hypermetabolic 3 cm lesion in LLL, and mediastinal/hilar LN. CEA wnl at 1.9 and C27-29 elevated to 415 (28 on 8/23/16). A CT guided biopsy of the right iliac bone was obtained on 9/4/19, pathology consistent with metastatic adenocarcinoma (breast), ER/TN negative, HER-2 negative PDL 1 < 1%. A second biopsy was take of the LLL nodule via EBUS on 9/5/19 did not show any malignancy.  C/T/L spine MRI 8/3/19 to 9/2/19 with numerous enhancing lesions of the C, T and L spine, largest around T9 and L1. No evidence of cord compression. Has a L1 compression fracture and impending L4. completed radiation therapy to T12-L5.-Neurosurgery recommended no surgical intervention but to wear Bruno brace when out of bed and HOB >30 degrees  Treatment:   2006:  ECOG 2104 protocol of dose-dense Adriamycin, Cytoxan and Avastin x4 cycles followed by Taxol and Avastin x4 cycles.   03/2007:  Completed 1 year Avastin.   11/2006-01/2007:  Radiotherapy to the right breast of 5040 cGy.   [id number]/[id number]:  Aromasin.  Stopped after moving to the [place].   01/2016:  Right  modified radical mastectomy with latissimus dorsi flap reconstruction and a left prophylactic mastectomy with latissimus dorsi flap reconstruction.   9/6/2019 till 9/18/2019 Received radiation T12-L5 3000 cGy in 10 fractions   -Neurosurgery recommended no surgical intervention but to wear Gorge brace when out of bed and HOB >30 degrees  9/18/2019 completed T12-L5 radiation in 10 fractions =3000 cGy  10/1/2019 Xeloda x 2 trials lowering dose to 1000 mg bid  10/23/2019 Began every 3 month Zometa  11/5/2019- 1/14/2020  weekly paclitaxel x 3 cycles  1/28/2020 Eribulin    Interval History: Ms. Alvarez is seen today for new nausea noted late in cycle 9 of Eribulin. Pt has not had this level of nausea and rare vomiting with this plan through first 8 cycles but on this 9th cycle has noted more the nausea vomiting, and decreased appetite with weight loss - more symptoms toward of end treatment. She has tried compazine which usually works for her but does have ativan as well. Currently feels better without nausea and denies need for IV antiemetic. She has had no fevers and no others ill around her. She is very cautious when out of the home with use of mask and regular hand washing. She also reports thought have not been as clear as in the past and sleep has been interrupted. She did tweak back last night but lists no pain. Denies mouth sores, skin issues. Also reports last night she did feel more short of breath but this was the first time since finding of PE on 7/17 with PET scan- has been using Lovenox yet and will go to [facility]. Continues on pain medication for back pain and state it is covering well with no constipation- using MS Contin before bed and usually using 2 short acting MSIR in day.   Rest of comprehensive and complete ROS is reviewed and is negative.   Past Medical History:   Diagnosis Date     Breast cancer (H)     Age 44     Common migraine      H/O bilateral mastectomy      Mild major depression (H)       "Multiple sclerosis (H)      Current Outpatient Medications   Medication     acetaminophen (TYLENOL) 500 MG tablet     apixaban ANTICOAGULANT (ELIQUIS) 5 MG tablet     busPIRone (BUSPAR) 15 MG tablet     calcium citrate-vitamin D (CITRACAL) 315-250 MG-UNIT TABS     Cholecalciferol (VITAMIN D3 PO)     cyclopentolate (CYCLOGYL) 1 % ophthalmic solution     diclofenac (VOLTAREN) 1 % topical gel     enoxaparin ANTICOAGULANT (LOVENOX) 60 MG/0.6ML syringe     ibuprofen (ADVIL/MOTRIN) 600 MG tablet     LORazepam (ATIVAN) 0.5 MG tablet     magnesium 250 MG tablet     morphine (MS CONTIN) 15 MG CR tablet     morphine (MSIR) 15 MG IR tablet     naloxone (NARCAN) 1 mg/mL for intranasal kit (2 syringes with 2 mucosal atomizer device)     polyethylene glycol (MIRALAX/GLYCOLAX) packet     prochlorperazine (COMPAZINE) 10 MG tablet     propranolol (INDERAL LA) 60 MG 24 hr capsule     senna-docusate (SENOKOT-S/PERICOLACE) 8.6-50 MG tablet     syringe/needle, disp, (B-D INTEGRA SYRINGE) 23G X 1\" 3 ML MISC     traZODone (DESYREL) 50 MG tablet     venlafaxine (EFFEXOR-ER) 225 MG TB24 24 hr tablet     No current facility-administered medications for this visit.      Allergies   Allergen Reactions     Bactrim [Sulfamethoxazole W/Trimethoprim]      Internal bleeding     Copaxone [Glatiramer] Hives       Physical Exam:/72 (BP Location: Left arm, Patient Position: Chair, Cuff Size: Adult Regular)   Pulse 110   Temp 98.6  F (37  C)   Wt 55.6 kg (122 lb 8 oz)   SpO2 98%   BMI 22.41 kg/m     Constitutional: Alert, healthy, and in no distress as seated.   ENT: Eyes bright , No mouth sores- has mask on  Neck: Supple, No adenopathy.Thyroid symmetric  Cardiac: Heart rate and rhythm is regular and strong without murmur- rate mildly elevated  Respiratory: Breathing easy. Lung sounds clear to auscultation- no sign of SOB as seated  Abdomen: Soft, non-tender, BS normal. No masses or organomegaly  MS: Muscle tone normal, extremities normal " with no edema.   Skin: No suspicious lesions or rashes  Neuro: Sensory grossly WNL, gait normal.   Lymph: Normal ant/post cervical, axillary, supraclavicular nodes  Psych: Mentation appears normal and affect normal/bright and smiling    Laboratory Results:   Results for orders placed or performed in visit on 07/28/20   CBC with platelets differential     Status: Abnormal   Result Value Ref Range    WBC 7.8 4.0 - 11.0 10e9/L    RBC Count 5.03 3.8 - 5.2 10e12/L    Hemoglobin 14.3 11.7 - 15.7 g/dL    Hematocrit 43.0 35.0 - 47.0 %    MCV 86 78 - 100 fl    MCH 28.4 26.5 - 33.0 pg    MCHC 33.3 31.5 - 36.5 g/dL    RDW 16.6 (H) 10.0 - 15.0 %    Platelet Count 288 150 - 450 10e9/L    Diff Method Automated Method     % Neutrophils 79.2 %    % Lymphocytes 11.8 %    % Monocytes 7.5 %    % Eosinophils 0.1 %    % Basophils 0.5 %    % Immature Granulocytes 0.9 %    Absolute Neutrophil 6.2 1.6 - 8.3 10e9/L    Absolute Lymphocytes 0.9 0.8 - 5.3 10e9/L    Absolute Monocytes 0.6 0.0 - 1.3 10e9/L    Absolute Eosinophils 0.0 0.0 - 0.7 10e9/L    Absolute Basophils 0.0 0.0 - 0.2 10e9/L    Abs Immature Granulocytes 0.1 0 - 0.4 10e9/L   Comprehensive metabolic panel     Status: Abnormal   Result Value Ref Range    Sodium 134 133 - 144 mmol/L    Potassium 2.8 (L) 3.4 - 5.3 mmol/L    Chloride 92 (L) 94 - 109 mmol/L    Carbon Dioxide 33 (H) 20 - 32 mmol/L    Anion Gap 9 3 - 14 mmol/L    Glucose 73 70 - 99 mg/dL    Urea Nitrogen 15 7 - 30 mg/dL    Creatinine 0.96 0.52 - 1.04 mg/dL    GFR Estimate 65 >60 mL/min/[1.73_m2]    GFR Estimate If Black 76 >60 mL/min/[1.73_m2]    Calcium 9.5 8.5 - 10.1 mg/dL    Bilirubin Total 0.9 0.2 - 1.3 mg/dL    Albumin 4.2 3.4 - 5.0 g/dL    Protein Total 7.6 6.8 - 8.8 g/dL    Alkaline Phosphatase 84 40 - 150 U/L    ALT 92 (H) 0 - 50 U/L    AST 50 (H) 0 - 45 U/L   Magnesium     Status: None   Result Value Ref Range    Magnesium 2.1 1.6 - 2.3 mg/dL     Assessment and Plan:   Metastatic Breast Cancer to brain and  spine-Pt has new symptoms with end of this cycle including much more nausea and vomiting and loss of appetite- suspicious for other viral source not related to chemotherapy due to rarity but no fevers or other symptoms. She does meet goals to continue with treatment today and is motivated to continue with plan but will support symptoms with additional fluids and electrolyte replacement.   Patient will return in 3 weeks with review prior to infusion with Dr. Zepeda.she is to call if symptoms do not improve.   Nausea and vomiting- pt tried compazine but was not effective- educated on stacking antiemetics if needed and will try using compazine and then stacking ativan if needed. If this is not working she will notify us and will provide zofran- possible in zydis form for the nausea/vomiting.   Loss of appetite- encouraged supplements and calorie laden fluids which are tolerated well. Offered dietician.   Bone mets- Pt completed radiation therapy to T12-L5 on 9/18/2019. She continues with back pain feels this is stable and is using Zometa every 3 months which is due after 9/30/2020.  She continues to take the calcium plus vitamin D daily for support.   Hypokalemia-potassium = 2.8  We will replace with electrolyte replacement plan today. Encouraged use of gatorade.   Back pain/bone metastasis- has palliative care oversight and is currently on MS Contin 15mg nightly with ibuprofen and MSIR as needed with good coverage she feels.  Anxiety/Depression- using  Effexor and Trazodone- trazodone is also helpful for sleep.   MS - currently not on treatment for her MS- follows with Dr Quiles in Neurology  Pt was seen face to face due to symptom issues   This was a 30 min visit with >50% in education and management of concerns.   Annie Bailon,CNP

## 2020-07-28 NOTE — PROGRESS NOTES
Infusion Nursing Note:  Shaneka Alvarez presents today for D8 Eribulin.    Patient seen by provider today: Yes: NP - for recent GI symptoms   present during visit today: Not Applicable.    Note: blood return obtained after 35 min of TPA instillation.  Patient has NP visit added, as new symptoms of GI nausea/vomiting.  Patient reports much improvement today.  K+ replaced both IV and oral.      Intravenous Access:  Implanted Port.    Treatment Conditions:  Results reviewed, labs MET treatment parameters, ok to proceed with treatment.      Post Infusion Assessment:   brisk blood return pre/during and post eribulin      Discharge Plan:   Reviewed upcoming appts with patient.  She will call if any continued GI symptoms.    PROSPER MCCRACKEN RN

## 2020-07-28 NOTE — PROGRESS NOTES
SPIRITUAL HEALTH SERVICES Progress Note  [facility]    Visited Shaneka Alvarez   in the infusion center for ongoing emotional/spiritual support.  Offered reflective conversation, support and affirmation as she shared her journey.       Illness Narrative - Patient has been very nauseous the past few days, which has been unusual. Otherwise she has been doing well.       Distress - Not able to visit her daughter in [place], had to cancel due to Covid.        Coping - Her son and his girlfriend from [place] hope to visit in August.  She is also heading to the [email or web address] with friends later this week. Continues to read spiritual books about the afterlife.       Meaning-Making - Not discussed today.       Plan - Patient knows that she can request a Spiritual Health encounter as needed. She voiced appreciation of  support.     Gina Pineda  Staff

## 2020-07-28 NOTE — LETTER
7/28/2020         RE: Shaneka Alvarez  [address] [facility] [address]        Dear Colleague,    Thank you for referring your patient, Shaneka Alvarez, to the [facility]. Please see a copy of my visit note below.    Oncology Follow Up Visit: July 28, 2020     Oncologist: Dr Dewayne Zepeda  PCP: Mohit Barcenas    Diagnosis: Metastatic Breast Cancer to brain and spine  Shaneka Alvarez is a 61 yo female who was diagnosed with Stage IIB, T2 N1 M0 invasive lobular carcinoma of the right breast  In January 2006.  Final pathology showed a 4.5 x 3.8 x 2.5 cm, 01/14 lymph nodes positive.  Estrogen, progesterone receptor positive, HER-2 negative.     Genetics- BRCA1 and 2 mutations not detected. 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  There was no evidence of new multiple sclerosis lesions.  PET scan on 8/30/2019 which showed widespread bone metastatic lesions (calvarium, spine, sacrum, pelvis, ribs most prominent at C7, T3, L1 and L4), hypermetabolic 3 cm lesion in LLL, and mediastinal/hilar LN. CEA wnl at 1.9 and C27-29 elevated to 415 (28 on 8/23/16). A CT guided biopsy of the right iliac bone was obtained on 9/4/19, pathology consistent with metastatic adenocarcinoma (breast), ER/AL negative, HER-2 negative PDL 1 < 1%. A second biopsy was take of the LLL nodule via EBUS on 9/5/19 did not show any malignancy.  C/T/L spine MRI 8/3/19 to 9/2/19 with numerous enhancing lesions of the C, T and L spine, largest around T9 and L1. No evidence of cord compression. Has a L1 compression fracture and impending L4. completed radiation therapy to T12-L5.-Neurosurgery recommended no surgical intervention but to wear [place] brace when out of bed and HOB >30 degrees  Treatment:   2006:  ECOG 2104 protocol of dose-dense Adriamycin, Cytoxan and  Avastin x4 cycles followed by Taxol and Avastin x4 cycles.   03/2007:  Completed 1 year Avastin.   11/2006-01/2007:  Radiotherapy to the right breast of 5040 cGy.   03/20077613-5026:  Aromasin.  Stopped after moving to the [facility].   01/2016:  Right modified radical mastectomy with latissimus dorsi flap reconstruction and a left prophylactic mastectomy with latissimus dorsi flap reconstruction.   9/6/2019 till 9/18/2019 Received radiation T12-L5 3000 cGy in 10 fractions   -Neurosurgery recommended no surgical intervention but to wear [place] brace when out of bed and HOB >30 degrees  9/18/2019 completed T12-L5 radiation in 10 fractions =3000 cGy  10/1/2019 Xeloda x 2 trials lowering dose to 1000 mg bid  10/23/2019 Began every 3 month Zometa  11/5/2019- 1/14/2020  weekly paclitaxel x 3 cycles  1/28/2020 Eribulin    Interval History: Ms. Alvarez is seen today for new nausea noted late in cycle 9 of Eribulin. Pt has not had this level of nausea and rare vomiting with this plan through first 8 cycles but on this 9th cycle has noted more the nausea vomiting, and decreased appetite with weight loss - more symptoms toward of end treatment. She has tried compazine which usually works for her but does have ativan as well. Currently feels better without nausea and denies need for IV antiemetic. She has had no fevers and no others ill around her. She is very cautious when out of the home with use of mask and regular hand washing. She also reports thought have not been as clear as in the past and sleep has been interrupted. She did tweak back last night but lists no pain. Denies mouth sores, skin issues. Also reports last night she did feel more short of breath but this was the first time since finding of PE on 7/17 with PET scan- has been using Lovenox yet and will go to [facility]. Continues on pain medication for back pain and state it is covering well with no constipation- using MS Contin before bed and usually using 2 short  "acting MSIR in day.   Rest of comprehensive and complete ROS is reviewed and is negative.   Past Medical History:   Diagnosis Date     Breast cancer (H)     Age 44     Common migraine      H/O bilateral mastectomy      Mild major depression (H)      Multiple sclerosis (H)      Current Outpatient Medications   Medication     acetaminophen (TYLENOL) 500 MG tablet     apixaban ANTICOAGULANT (ELIQUIS) 5 MG tablet     busPIRone (BUSPAR) 15 MG tablet     calcium citrate-vitamin D (CITRACAL) 315-250 MG-UNIT TABS     Cholecalciferol (VITAMIN D3 PO)     cyclopentolate (CYCLOGYL) 1 % ophthalmic solution     diclofenac (VOLTAREN) 1 % topical gel     enoxaparin ANTICOAGULANT (LOVENOX) 60 MG/0.6ML syringe     ibuprofen (ADVIL/MOTRIN) 600 MG tablet     LORazepam (ATIVAN) 0.5 MG tablet     magnesium 250 MG tablet     morphine (MS CONTIN) 15 MG CR tablet     morphine (MSIR) 15 MG IR tablet     naloxone (NARCAN) 1 mg/mL for intranasal kit (2 syringes with 2 mucosal atomizer device)     polyethylene glycol (MIRALAX/GLYCOLAX) packet     prochlorperazine (COMPAZINE) 10 MG tablet     propranolol (INDERAL LA) 60 MG 24 hr capsule     senna-docusate (SENOKOT-S/PERICOLACE) 8.6-50 MG tablet     syringe/needle, disp, (B-D INTEGRA SYRINGE) 23G X 1\" 3 ML MISC     traZODone (DESYREL) 50 MG tablet     venlafaxine (EFFEXOR-ER) 225 MG TB24 24 hr tablet     No current facility-administered medications for this visit.      Allergies   Allergen Reactions     Bactrim [Sulfamethoxazole W/Trimethoprim]      Internal bleeding     Copaxone [Glatiramer] Hives       Physical Exam:/72 (BP Location: Left arm, Patient Position: Chair, Cuff Size: Adult Regular)   Pulse 110   Temp 98.6  F (37  C)   Wt 55.6 kg (122 lb 8 oz)   SpO2 98%   BMI 22.41 kg/m     Constitutional: Alert, healthy, and in no distress as seated.   ENT: Eyes bright , No mouth sores- has mask on  Neck: Supple, No adenopathy.Thyroid symmetric  Cardiac: Heart rate and rhythm is regular " and strong without murmur- rate mildly elevated  Respiratory: Breathing easy. Lung sounds clear to auscultation- no sign of SOB as seated  Abdomen: Soft, non-tender, BS normal. No masses or organomegaly  MS: Muscle tone normal, extremities normal with no edema.   Skin: No suspicious lesions or rashes  Neuro: Sensory grossly WNL, gait normal.   Lymph: Normal ant/post cervical, axillary, supraclavicular nodes  Psych: Mentation appears normal and affect normal/bright and smiling    Laboratory Results:   Results for orders placed or performed in visit on 07/28/20   CBC with platelets differential     Status: Abnormal   Result Value Ref Range    WBC 7.8 4.0 - 11.0 10e9/L    RBC Count 5.03 3.8 - 5.2 10e12/L    Hemoglobin 14.3 11.7 - 15.7 g/dL    Hematocrit 43.0 35.0 - 47.0 %    MCV 86 78 - 100 fl    MCH 28.4 26.5 - 33.0 pg    MCHC 33.3 31.5 - 36.5 g/dL    RDW 16.6 (H) 10.0 - 15.0 %    Platelet Count 288 150 - 450 10e9/L    Diff Method Automated Method     % Neutrophils 79.2 %    % Lymphocytes 11.8 %    % Monocytes 7.5 %    % Eosinophils 0.1 %    % Basophils 0.5 %    % Immature Granulocytes 0.9 %    Absolute Neutrophil 6.2 1.6 - 8.3 10e9/L    Absolute Lymphocytes 0.9 0.8 - 5.3 10e9/L    Absolute Monocytes 0.6 0.0 - 1.3 10e9/L    Absolute Eosinophils 0.0 0.0 - 0.7 10e9/L    Absolute Basophils 0.0 0.0 - 0.2 10e9/L    Abs Immature Granulocytes 0.1 0 - 0.4 10e9/L   Comprehensive metabolic panel     Status: Abnormal   Result Value Ref Range    Sodium 134 133 - 144 mmol/L    Potassium 2.8 (L) 3.4 - 5.3 mmol/L    Chloride 92 (L) 94 - 109 mmol/L    Carbon Dioxide 33 (H) 20 - 32 mmol/L    Anion Gap 9 3 - 14 mmol/L    Glucose 73 70 - 99 mg/dL    Urea Nitrogen 15 7 - 30 mg/dL    Creatinine 0.96 0.52 - 1.04 mg/dL    GFR Estimate 65 >60 mL/min/[1.73_m2]    GFR Estimate If Black 76 >60 mL/min/[1.73_m2]    Calcium 9.5 8.5 - 10.1 mg/dL    Bilirubin Total 0.9 0.2 - 1.3 mg/dL    Albumin 4.2 3.4 - 5.0 g/dL    Protein Total 7.6 6.8 - 8.8 g/dL     Alkaline Phosphatase 84 40 - 150 U/L    ALT 92 (H) 0 - 50 U/L    AST 50 (H) 0 - 45 U/L   Magnesium     Status: None   Result Value Ref Range    Magnesium 2.1 1.6 - 2.3 mg/dL     Assessment and Plan:   Metastatic Breast Cancer to brain and spine-Pt has new symptoms with end of this cycle including much more nausea and vomiting and loss of appetite- suspicious for other viral source not related to chemotherapy due to rarity but no fevers or other symptoms. She does meet goals to continue with treatment today and is motivated to continue with plan but will support symptoms with additional fluids and electrolyte replacement.   Patient will return in 3 weeks with review prior to infusion with Dr. Zepeda.she is to call if symptoms do not improve.   Nausea and vomiting- pt tried compazine but was not effective- educated on stacking antiemetics if needed and will try using compazine and then stacking ativan if needed. If this is not working she will notify us and will provide zofran- possible in zydis form for the nausea/vomiting.   Loss of appetite- encouraged supplements and calorie laden fluids which are tolerated well. Offered dietician.   Bone mets- Pt completed radiation therapy to T12-L5 on 9/18/2019. She continues with back pain feels this is stable and is using Zometa every 3 months which is due after 9/30/2020.  She continues to take the calcium plus vitamin D daily for support.   Hypokalemia-potassium = 2.8  We will replace with electrolyte replacement plan today. Encouraged use of gatorade.   Back pain/bone metastasis- has palliative care oversight and is currently on MS Contin 15mg nightly with ibuprofen and MSIR as needed with good coverage she feels.  Anxiety/Depression- using  Effexor and Trazodone- trazodone is also helpful for sleep.   MS - currently not on treatment for her MS- follows with Dr Quiles in Neurology  Pt was seen face to face due to symptom issues   This was a 30 min visit with >50% in  education and management of concerns.   Annie Bailon CNP      Again, thank you for allowing me to participate in the care of your patient.        Sincerely,        Annie Bailon, NP, APRN CNP

## 2020-07-28 NOTE — PROGRESS NOTES
Unable to obtain blood return from port, tried laying patient flat. One inch needle used. Denied discomfort with flushing saline. Alteplase ordered and instilled. Port needle left accessed for treatment. Tolerated port access without complaint. Port site scrubbed with Chloraprep for 30 seconds and allowed to dry completely prior to dressing application. Accessed using sterile technique. See documentation flowsheet. Mariah Paniagua, RN, BSN, OCN

## 2020-08-03 ENCOUNTER — PATIENT OUTREACH (OUTPATIENT)
Dept: ONCOLOGY | Facility: CLINIC | Age: 60
End: 2020-08-03

## 2020-08-03 ENCOUNTER — TELEPHONE (OUTPATIENT)
Dept: ONCOLOGY | Facility: CLINIC | Age: 60
End: 2020-08-03

## 2020-08-03 NOTE — PROGRESS NOTES
Return call made to patient to follow up on question about oral blood thinner. Patient has completed the Lovenox injections. Informed patient that Dr. Zepeda ordered Eliquis on 7/21/20. The Rx was sent to [facility]. Called pharmacy and found out that patient already picked up the Eliquis. Patient doesn't remember picking the Rx up. She will look for the medication and call back if she can't find it.

## 2020-08-03 NOTE — TELEPHONE ENCOUNTER
Patient called, Has finished the infusions for blood thinners and was told to start a pill form now. Patient is unsure where she would get those pills from and then would want that prescription sent as well. Please call patient at [phone].   Thank you   Mercedes Martines

## 2020-08-11 ENCOUNTER — ONCOLOGY VISIT (OUTPATIENT)
Dept: ONCOLOGY | Facility: CLINIC | Age: 60
End: 2020-08-11
Payer: COMMERCIAL

## 2020-08-11 ENCOUNTER — VIRTUAL VISIT (OUTPATIENT)
Dept: ONCOLOGY | Facility: CLINIC | Age: 60
End: 2020-08-11
Attending: INTERNAL MEDICINE
Payer: COMMERCIAL

## 2020-08-11 ENCOUNTER — INFUSION THERAPY VISIT (OUTPATIENT)
Dept: INFUSION THERAPY | Facility: CLINIC | Age: 60
End: 2020-08-11
Payer: COMMERCIAL

## 2020-08-11 DIAGNOSIS — C50.919 METASTATIC BREAST CANCER: ICD-10-CM

## 2020-08-11 DIAGNOSIS — E87.6 HYPOKALEMIA: ICD-10-CM

## 2020-08-11 DIAGNOSIS — C50.912 BILATERAL MALIGNANT NEOPLASM OF BREAST IN FEMALE, UNSPECIFIED ESTROGEN RECEPTOR STATUS, UNSPECIFIED SITE OF BREAST (H): ICD-10-CM

## 2020-08-11 DIAGNOSIS — C50.911 BILATERAL MALIGNANT NEOPLASM OF BREAST IN FEMALE, UNSPECIFIED ESTROGEN RECEPTOR STATUS, UNSPECIFIED SITE OF BREAST (H): ICD-10-CM

## 2020-08-11 DIAGNOSIS — G89.3 CANCER ASSOCIATED PAIN: ICD-10-CM

## 2020-08-11 DIAGNOSIS — G89.3 CANCER ASSOCIATED PAIN: Primary | ICD-10-CM

## 2020-08-11 DIAGNOSIS — G47.00 INSOMNIA, UNSPECIFIED TYPE: ICD-10-CM

## 2020-08-11 DIAGNOSIS — C50.919 METASTATIC BREAST CANCER: Primary | ICD-10-CM

## 2020-08-11 DIAGNOSIS — E87.6 HYPOKALEMIA: Primary | ICD-10-CM

## 2020-08-11 LAB
ALBUMIN SERPL-MCNC: 3.3 G/DL (ref 3.4–5)
ALP SERPL-CCNC: 76 U/L (ref 40–150)
ALT SERPL W P-5'-P-CCNC: 29 U/L (ref 0–50)
ANION GAP SERPL CALCULATED.3IONS-SCNC: 6 MMOL/L (ref 3–14)
AST SERPL W P-5'-P-CCNC: 19 U/L (ref 0–45)
BASOPHILS # BLD AUTO: 0.1 10E9/L (ref 0–0.2)
BASOPHILS NFR BLD AUTO: 2 %
BILIRUB SERPL-MCNC: 0.3 MG/DL (ref 0.2–1.3)
BUN SERPL-MCNC: 8 MG/DL (ref 7–30)
CALCIUM SERPL-MCNC: 8.8 MG/DL (ref 8.5–10.1)
CANCER AG27-29 SERPL-ACNC: 290 U/ML (ref 0–39)
CHLORIDE SERPL-SCNC: 102 MMOL/L (ref 94–109)
CO2 SERPL-SCNC: 29 MMOL/L (ref 20–32)
CREAT SERPL-MCNC: 0.67 MG/DL (ref 0.52–1.04)
DIFFERENTIAL METHOD BLD: ABNORMAL
ERYTHROCYTE [DISTWIDTH] IN BLOOD BY AUTOMATED COUNT: 15.7 % (ref 10–15)
GFR SERPL CREATININE-BSD FRML MDRD: >90 ML/MIN/{1.73_M2}
GLUCOSE SERPL-MCNC: 83 MG/DL (ref 70–99)
HCT VFR BLD AUTO: 35.9 % (ref 35–47)
HGB BLD-MCNC: 11.6 G/DL (ref 11.7–15.7)
LYMPHOCYTES # BLD AUTO: 1 10E9/L (ref 0.8–5.3)
LYMPHOCYTES NFR BLD AUTO: 38 %
MAGNESIUM SERPL-MCNC: 2 MG/DL (ref 1.6–2.3)
MCH RBC QN AUTO: 28.8 PG (ref 26.5–33)
MCHC RBC AUTO-ENTMCNC: 32.3 G/DL (ref 31.5–36.5)
MCV RBC AUTO: 89 FL (ref 78–100)
MONOCYTES # BLD AUTO: 0.5 10E9/L (ref 0–1.3)
MONOCYTES NFR BLD AUTO: 18 %
NEUTROPHILS # BLD AUTO: 0.9 10E9/L (ref 1.6–8.3)
NEUTROPHILS NFR BLD AUTO: 42 %
PLATELET # BLD AUTO: 201 10E9/L (ref 150–450)
PLATELET # BLD EST: ABNORMAL 10*3/UL
POTASSIUM SERPL-SCNC: 4.2 MMOL/L (ref 3.4–5.3)
PROT SERPL-MCNC: 6.1 G/DL (ref 6.8–8.8)
RBC # BLD AUTO: 4.03 10E12/L (ref 3.8–5.2)
RBC MORPH BLD: NORMAL
SODIUM SERPL-SCNC: 137 MMOL/L (ref 133–144)
WBC # BLD AUTO: 2.5 10E9/L (ref 4–11)

## 2020-08-11 PROCEDURE — 99214 OFFICE O/P EST MOD 30 MIN: CPT | Mod: GT | Performed by: INTERNAL MEDICINE

## 2020-08-11 PROCEDURE — 80053 COMPREHEN METABOLIC PANEL: CPT | Performed by: INTERNAL MEDICINE

## 2020-08-11 PROCEDURE — 85025 COMPLETE CBC W/AUTO DIFF WBC: CPT | Performed by: INTERNAL MEDICINE

## 2020-08-11 PROCEDURE — 99207 ZZC NO CHARGE NURSE ONLY: CPT

## 2020-08-11 PROCEDURE — 86300 IMMUNOASSAY TUMOR CA 15-3: CPT | Performed by: INTERNAL MEDICINE

## 2020-08-11 PROCEDURE — 83735 ASSAY OF MAGNESIUM: CPT | Performed by: INTERNAL MEDICINE

## 2020-08-11 RX ORDER — HEPARIN SODIUM (PORCINE) LOCK FLUSH IV SOLN 100 UNIT/ML 100 UNIT/ML
5 SOLUTION INTRAVENOUS
Status: CANCELLED | OUTPATIENT
Start: 2020-08-11

## 2020-08-11 RX ORDER — LORAZEPAM 2 MG/ML
0.5 INJECTION INTRAMUSCULAR EVERY 4 HOURS PRN
Status: CANCELLED
Start: 2020-08-25

## 2020-08-11 RX ORDER — EPINEPHRINE 0.3 MG/.3ML
0.3 INJECTION SUBCUTANEOUS EVERY 5 MIN PRN
Status: CANCELLED | OUTPATIENT
Start: 2020-08-25

## 2020-08-11 RX ORDER — ALBUTEROL SULFATE 90 UG/1
1-2 AEROSOL, METERED RESPIRATORY (INHALATION)
Status: CANCELLED
Start: 2020-08-11

## 2020-08-11 RX ORDER — DIPHENHYDRAMINE HYDROCHLORIDE 50 MG/ML
50 INJECTION INTRAMUSCULAR; INTRAVENOUS
Status: CANCELLED
Start: 2020-08-25

## 2020-08-11 RX ORDER — HEPARIN SODIUM,PORCINE 10 UNIT/ML
5 VIAL (ML) INTRAVENOUS
Status: CANCELLED | OUTPATIENT
Start: 2020-08-25

## 2020-08-11 RX ORDER — HEPARIN SODIUM,PORCINE 10 UNIT/ML
5 VIAL (ML) INTRAVENOUS
Status: CANCELLED | OUTPATIENT
Start: 2020-08-11

## 2020-08-11 RX ORDER — ALBUTEROL SULFATE 90 UG/1
1-2 AEROSOL, METERED RESPIRATORY (INHALATION)
Status: CANCELLED
Start: 2020-08-25

## 2020-08-11 RX ORDER — SODIUM CHLORIDE 9 MG/ML
1000 INJECTION, SOLUTION INTRAVENOUS CONTINUOUS PRN
Status: CANCELLED
Start: 2020-08-25

## 2020-08-11 RX ORDER — TRAZODONE HYDROCHLORIDE 50 MG/1
50 TABLET, FILM COATED ORAL AT BEDTIME
Qty: 30 TABLET | Refills: 1 | Status: SHIPPED | OUTPATIENT
Start: 2020-08-11 | End: 2020-09-08

## 2020-08-11 RX ORDER — MORPHINE SULFATE 15 MG/1
15 TABLET, FILM COATED, EXTENDED RELEASE ORAL EVERY EVENING
Qty: 30 TABLET | Refills: 0 | Status: SHIPPED | OUTPATIENT
Start: 2020-08-11 | End: 2020-08-11

## 2020-08-11 RX ORDER — SODIUM CHLORIDE 9 MG/ML
1000 INJECTION, SOLUTION INTRAVENOUS CONTINUOUS PRN
Status: CANCELLED
Start: 2020-08-11

## 2020-08-11 RX ORDER — EPINEPHRINE 1 MG/ML
0.3 INJECTION, SOLUTION INTRAMUSCULAR; SUBCUTANEOUS EVERY 5 MIN PRN
Status: CANCELLED | OUTPATIENT
Start: 2020-08-11

## 2020-08-11 RX ORDER — ALBUTEROL SULFATE 0.83 MG/ML
2.5 SOLUTION RESPIRATORY (INHALATION)
Status: CANCELLED | OUTPATIENT
Start: 2020-08-25

## 2020-08-11 RX ORDER — NALOXONE HYDROCHLORIDE 0.4 MG/ML
.1-.4 INJECTION, SOLUTION INTRAMUSCULAR; INTRAVENOUS; SUBCUTANEOUS
Status: CANCELLED | OUTPATIENT
Start: 2020-08-25

## 2020-08-11 RX ORDER — METHYLPREDNISOLONE SODIUM SUCCINATE 125 MG/2ML
125 INJECTION, POWDER, LYOPHILIZED, FOR SOLUTION INTRAMUSCULAR; INTRAVENOUS
Status: CANCELLED
Start: 2020-08-11

## 2020-08-11 RX ORDER — EPINEPHRINE 1 MG/ML
0.3 INJECTION, SOLUTION INTRAMUSCULAR; SUBCUTANEOUS EVERY 5 MIN PRN
Status: CANCELLED | OUTPATIENT
Start: 2020-08-25

## 2020-08-11 RX ORDER — EPINEPHRINE 0.3 MG/.3ML
0.3 INJECTION SUBCUTANEOUS EVERY 5 MIN PRN
Status: CANCELLED | OUTPATIENT
Start: 2020-08-11

## 2020-08-11 RX ORDER — NALOXONE HYDROCHLORIDE 0.4 MG/ML
.1-.4 INJECTION, SOLUTION INTRAMUSCULAR; INTRAVENOUS; SUBCUTANEOUS
Status: CANCELLED | OUTPATIENT
Start: 2020-08-11

## 2020-08-11 RX ORDER — DIPHENHYDRAMINE HYDROCHLORIDE 50 MG/ML
50 INJECTION INTRAMUSCULAR; INTRAVENOUS
Status: CANCELLED
Start: 2020-08-11

## 2020-08-11 RX ORDER — HEPARIN SODIUM (PORCINE) LOCK FLUSH IV SOLN 100 UNIT/ML 100 UNIT/ML
5 SOLUTION INTRAVENOUS
Status: CANCELLED | OUTPATIENT
Start: 2020-08-25

## 2020-08-11 RX ORDER — MEPERIDINE HYDROCHLORIDE 25 MG/ML
25 INJECTION INTRAMUSCULAR; INTRAVENOUS; SUBCUTANEOUS EVERY 30 MIN PRN
Status: CANCELLED | OUTPATIENT
Start: 2020-08-11

## 2020-08-11 RX ORDER — MEPERIDINE HYDROCHLORIDE 25 MG/ML
25 INJECTION INTRAMUSCULAR; INTRAVENOUS; SUBCUTANEOUS EVERY 30 MIN PRN
Status: CANCELLED | OUTPATIENT
Start: 2020-08-25

## 2020-08-11 RX ORDER — LORAZEPAM 2 MG/ML
0.5 INJECTION INTRAMUSCULAR EVERY 4 HOURS PRN
Status: CANCELLED
Start: 2020-08-11

## 2020-08-11 RX ORDER — METHYLPREDNISOLONE SODIUM SUCCINATE 125 MG/2ML
125 INJECTION, POWDER, LYOPHILIZED, FOR SOLUTION INTRAMUSCULAR; INTRAVENOUS
Status: CANCELLED
Start: 2020-08-25

## 2020-08-11 RX ORDER — MORPHINE SULFATE 15 MG/1
15 TABLET, FILM COATED, EXTENDED RELEASE ORAL EVERY EVENING
Qty: 30 TABLET | Refills: 0 | Status: SHIPPED | OUTPATIENT
Start: 2020-08-11 | End: 2020-09-08

## 2020-08-11 RX ORDER — ALBUTEROL SULFATE 0.83 MG/ML
2.5 SOLUTION RESPIRATORY (INHALATION)
Status: CANCELLED | OUTPATIENT
Start: 2020-08-11

## 2020-08-11 RX ORDER — HEPARIN SODIUM (PORCINE) LOCK FLUSH IV SOLN 100 UNIT/ML 100 UNIT/ML
5 SOLUTION INTRAVENOUS
Status: DISCONTINUED | OUTPATIENT
Start: 2020-08-11 | End: 2020-08-11 | Stop reason: HOSPADM

## 2020-08-11 RX ADMIN — HEPARIN SODIUM (PORCINE) LOCK FLUSH IV SOLN 100 UNIT/ML 5 ML: 100 SOLUTION at 07:37

## 2020-08-11 NOTE — PATIENT INSTRUCTIONS
Chemo today IF labs are OK    See Annie in 3 weeks    See me in 6 weeks    Refill morphine and trazodone today.

## 2020-08-11 NOTE — PROGRESS NOTES
Port needle-one inch- left accessed for treatment. Tolerated port access and draw without complaint. Port site scrubbed with Chloraprep for 30 seconds and allowed to dry completely prior to dressing application. Accessed using sterile technique. [place] tubes drawn-Red gel/Green/Purple tubes. Double signed by patient and RN. See documentation flowsheet. Mariah Paniagua, RN, BSN, OCN

## 2020-08-11 NOTE — LETTER
8/11/2020         RE: Shaneka Alvarez  [id number] [facility] [id number]        Dear Colleague,    Thank you for referring your patient, Shaneka Alvarez, to the [facility]. Please see a copy of my visit note below.      ONCOLOGY FOLLOW UP NOTE: 8/11/2020        I took the history from reviewing the previous notes that she was following with Dr. Amaya.  I have copied and updated from prior notes.    ONCOLOGY History:    1.  January 2006:  Diagnosed with Stage IIB, T2 N1 M0 invasive lobular carcinoma of the right breast.  Final pathology showed a 4.5 x 3.8 x 2.5 cm, 01/14 lymph nodes positive.  Estrogen, progesterone receptor positive, HER-2 negative.     2.  Genetics.  BRCA1 and 2 mutations not detected. 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       THERAPY TO DATE:   1. 2006:  ECOG 2104 protocol of dose-dense Adriamycin, Cytoxan and Avastin x4 cycles followed by Taxol and Avastin x4 cycles.   2.  03/2007:  Completed 1 year Avastin.   3.  11/2006-01/2007:  Radiotherapy to the right breast of 5040 cGy.   4.  03/[id number]:  Aromasin.  Stopped after moving to the [facility].   5.  01/2016:  Right modified radical mastectomy with latissimus dorsi flap reconstruction and a left prophylactic mastectomy with latissimus dorsi flap reconstruction.     She recently had MRI of the brain as a follow-up for multiple sclerosis and there were multiple calvarial lesions noted which was suspicious for metastatic disease.  There was no evidence of new multiple sclerosis lesions.  She had a PET scan on 8/30/2019 which showed widespread bone metastatic lesions (calvarium, spine, sacrum, pelvis, ribs most prominent at C7, T3, L1 and L4), hypermetabolic 3 cm lesion in LLL, and mediastinal/hilar LN. CEA wnl at 1.9 and C27-29 elevated to 415 (28 on 8/23/16). A CT guided biopsy of the right iliac bone was obtained on 9/4/19, pathology consistent with metastatic adenocarcinoma (breast), ER/KY negative,  HER-2 negative PDL 1 < 1%. A second biopsy was take of the LLL nodule via EBUS on 9/5/19 did not show any malignancy.    C/T/L spine MRI 8/3/19 to 9/2/19 with numerous enhancing lesions of the C, T and L spine, largest around T9 and L1. No evidence of cord compression. Has a L1 compression fracture and impending L4.     Received radiation T12-L5 3000 cGy in 10 fractions from 9/6/2019 till 9/18/2019.  Neurosurgery recommended no surgical intervention but to wear Bristol brace when out of bed and HOB >30 degrees    She started palliative Xeloda 2000/1500 on 10/1/2019 but after just a few doses she noticed nausea, red eyes blurred vision, loss of appetite.  She stopped taking it after 5 doses and was seen by nurse practitioner on 10/7/2019 when she was improving.  At that point she was restarted on Xeloda at a lower dose of 1000 mg twice a day.  As she was not able to tolerate even the lower dose with extreme nausea and vomiting and feeling extremely fatigued and blurry vision, we decided on stopping Xeloda.    We decided on repeating scans and MRI brain on 10/25/2019 showed no intracranial metastatic disease.   Multiple enhancing calvarial lesions may be increased in number and are suggestive of metastatic disease. Thinner imaging on the current study may identify the smaller lesions and may be responsible for  identified more lesions.   There is a single focus of T2 hyperintense signal within the anterior right temporal lobe may represent interval demyelination. There is no evidence for active demyelination.    PET/CT on 11/1/2019 showed favorable response to therapy and Overall FDG uptake within scattered osseous metastases is decreased since 8/30/2019, particularly within the pelvis. Increased sclerotic appearance of L1 and L4 pathologic compression deformities, likely sequela of recently completed palliative radiation therapy.    No significant FDG uptake in previously demonstrated hypermetabolic mediastinal lymph  nodes. Biopsy negative on 9/5/2019.  There is also decreased FDG uptake in the left lower lobe (biopsy negative for  malignancy), now with dense consolidation containing air bronchograms suggestive of infection versus aspiration.  There is diffuse FDG uptake within the esophagus, consistent with esophagitis.    Because of intolerance to Xeloda we decided on switching to weekly paclitaxel on 11/5/2019.    C#2 Taxol 12/4/19- started with 70mg/m2 dose reduction    C#3 Taxol 12/30/2019     PET/CT on 1/24/2020 showed progression of the disease in some of the bone lesions including increase in size and lytic lesion within the vertebral body of C4 measuring 1 cm as opposed to 0.6 cm previously and a new central soft tissue nodule with SUV max 4.1 when previously it was non-hypermetabolic.  There is also some progression noted in the right proximal femur and right iliac bone.  There is decreased metabolic uptake in the right lamina of T9 with SUV max 4.7 when previously it was 8.0.  Other lesions are stable.    CA 27-29 also had increased significantly and it was 257 on 1/28/2020.    We decided on switching to eribulin on 1/28/2020.    C#2 2/18/2020  C#2 D#8 2/25/2020    C#3 D#1 3/18/2020 -delayed by 1 week as per patient's preference because she wanted to visit her family.    She had a repeat PET/CT on 3/27/2020 which showed stable size of the bone metastatic lesions although the FDG avidity was less, consistent with treatment response.    She had MRI of the thoracic spine on 4/3/2020 and it was compared to the one which was done in August 2019 and it showed increased size of several metastatic lesions most notably at T9 but also at T5-T7.  Other lesions at T10, T11 and T12 appeared improved.    CA 27-29 was also down to 222 on 3/18/2020.    Cycle #4 eribulin ( dose reduced to 1.2 mg/m2 )-4/7/2020-   Cycle #5 Eribulin ( 1.2 mg/m2 )- 4/28/2020   Cycle #6 Eribulin ( 1.2 mg/m2 )- 5/19/2020     Cycle #7 Eribulin ( 1.2 mg/m2 )-  6/9/2020    Cycle #8 Eribulin ( 1.2 mg/m2 )- 6/30/2020     She had a repeat PET scan on 7/17/2020 which showed incidental finding of new acute bilateral pulmonary embolism in the distal left main pulmonary artery extending in the left upper and lower lobes and in the segmental and branch arteries in the right lower lobe.    It also showed mildly increased FDG avidity in the lytic lesion in the T9 lamina and right pedicle with SUV max 5.3, previously 3.9.  That is also slightly increased FDG uptake to the left anterior fifth rib at the costochondral junction SUV max 3.9, previously 2.5.  There is slightly decreased FDG uptake in the right femoral neck lesion SUV max 2.2, previously 2.9.  FDG uptake in other bone lesions is fairly stable.  No new metastasis are seen.    CA 27-29 was 308 on 6/30/2020.  It was 286 on 5/19/2020.    Overall this is consistent with only slight progression versus stable disease.    She was started on Lovenox.    Cycle #9 eribulin 7/21/2020. CA 27-29 was 238    C#10 eribulin 8/11/2020.        I had also recommended starting Zometa so she got dental clearance to start with that.  She received Zometa on 10/23/2019 and 1/14/2020 and 4/7/2020 and 6/30/2020 on a 3 monthly schedule.      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  It was thought that this could be due to Taxol as patient reported to the ophthalmologist that she was on Taxol in September 2019 when her symptoms started.  But she was not on Taxol in September 2019 when she started noticing the visual changes so Taxol is not responsible for this.  The first dose of Taxol was on 11/5/2019.       Interval history.  This is a video visit.    She is feeling well.  She had some nausea and vomiting after cycle number 9-day #1 but she mentioned that day #8 went well and she took Compazine and occasional Ativan for 3 days and did not have any nausea vomiting.  She had previously lost weight but regained  that weight back and today weight 131 pounds.  She is now on Eliquis.  Denies any shortness of breath.  Energy is good.  Back pain is under decent control with MS Contin and a couple of immediate release morphine daily.  She is now on trazodone for sleep which is helping some.  Bowel movements are fine.  Denies any worsening neuropathy.  No fevers or infections.  No new swellings.  Denies bleeding issues.        ECOG 1    ROS:  Rest of the comprehensive review of the system was essentially unremarkable.          I reviewed other history in epic as below.       PAST MEDICAL HISTORY:     1.  Breast cancer  2.  Multiple sclerosis.   3.  Depression.   4.  Migraines.   5.  Hypertension.   6.  Cholecystectomy and umbilical hernia repair.     SOCIAL HISTORY: She smoked for 5 years but quit many years ago.  Drinks alcohol socially.  She lives with her .  She is a teacher in middle school.       FAMILY HISTORY: She mentions that her mother had breast cancer at 49.  Both grandmothers had breast cancer but she is not sure of the age.  A couple of cousins have cancers.  Patient has 3 children who are healthy.    Current Outpatient Medications   Medication     acetaminophen (TYLENOL) 500 MG tablet     apixaban ANTICOAGULANT (ELIQUIS) 5 MG tablet     busPIRone (BUSPAR) 15 MG tablet     calcium citrate-vitamin D (CITRACAL) 315-250 MG-UNIT TABS     Cholecalciferol (VITAMIN D3 PO)     cyclopentolate (CYCLOGYL) 1 % ophthalmic solution     diclofenac (VOLTAREN) 1 % topical gel     ibuprofen (ADVIL/MOTRIN) 600 MG tablet     LORazepam (ATIVAN) 0.5 MG tablet     magnesium 250 MG tablet     morphine (MS CONTIN) 15 MG CR tablet     morphine (MSIR) 15 MG IR tablet     naloxone (NARCAN) 1 mg/mL for intranasal kit (2 syringes with 2 mucosal atomizer device)     polyethylene glycol (MIRALAX/GLYCOLAX) packet     prochlorperazine (COMPAZINE) 10 MG tablet     propranolol (INDERAL LA) 60 MG 24 hr capsule     senna-docusate  "(SENOKOT-S/PERICOLACE) 8.6-50 MG tablet     syringe/needle, disp, (B-D INTEGRA SYRINGE) 23G X 1\" 3 ML MISC     traZODone (DESYREL) 50 MG tablet     venlafaxine (EFFEXOR-ER) 225 MG TB24 24 hr tablet     No current facility-administered medications for this visit.      Facility-Administered Medications Ordered in Other Visits   Medication     heparin 100 UNIT/ML injection 5 mL     sodium chloride (PF) 0.9% PF flush 10-20 mL        Allergies   Allergen Reactions     Bactrim [Sulfamethoxazole W/Trimethoprim]      Internal bleeding     Copaxone [Glatiramer] Hives          PHYSICAL EXAMINATION:     There were no vitals taken for this visit.  Wt Readings from Last 4 Encounters:   07/28/20 55.6 kg (122 lb 8 oz)   07/21/20 59.1 kg (130 lb 6.4 oz)   07/17/20 59.9 kg (132 lb)   07/07/20 59 kg (130 lb)             Constitutional.  Does not seem to be in any acute distress.  Eyes.  No redness or discharge noted.  Respiratory.  Speaking in full sentences.  Breathing seems comfortable without any accessory use of muscles.    Skin.  Visualized his skin does not show any obvious rashes.  Musculoskeletal.  Range of motion for visualized areas is intact.  Neurological.  Alert and oriented x3.  Psychiatric.  Mood, mentation and affect are normal.  Decision making capacity is intact.      The rest of a comprehensive physical examination is deferred due to [facility] video visit restrictions.      Labs and Imaging.    Reviewed    Combined Report of:    PET and CT on  7/17/2020 3:52 PM :     1. PET of the neck, chest, abdomen, and pelvis.  2. PET CT Fusion for Attenuation Correction and Anatomical  Localization:    3. Diagnostic CT scan of the chest, abdomen, and pelvis with  intravenous contrast for interpretation.  3. CT of the chest, abdomen and pelvis obtained for diagnostic  interpretation.  4. 3D MIP and PET-CT fused images were processed on an independent  workstation and archived to PACS and reviewed by cosme" radiologist.     Technique:     1. PET: The patient received 13.72 mCi of F-18-FDG; the serum glucose  was 97 prior to administration, body weight was 59 kg. Images were  evaluated in the axial, sagittal, and coronal planes as well as the  rotational whole body MIP. Images were acquired from the Vertex to the  Feet.     UPTAKE WAS MEASURED AT 60 MINUTES.      BACKGROUND:  Liver SUV max= 3.5,   Aorta Blood SUV Max: 2.6.      2. CT: Volumetric acquisition for clinical interpretation of the  chest, abdomen, and pelvis acquired at 3 mm sections after the  uneventful administration of intravenous contrast. The chest, abdomen,  and pelvis were evaluated at 5 mm sections in bone, soft tissue, and  lung windows.       The patient received 80 cc. Of Isovue 370 intravenously and 500 mL  oral Breeza contrast for the examination.       3. 3D MIP and PET-CT fused images were processed on an independent  workstation and archived to PACS and reviewed by a radiologist.     INDICATION: Metastatic breast cancer     ADDITIONAL INFORMATION OBTAINED FROM EMR: 60-year-old woman with  history of invasive lobular carcinoma of the right breast diagnosed in  2006 treated with chemoradiation and hormonal therapy. Underwent  bilateral mastectomy 2016. Radiotherapy to the lumbar spine T12-L5 in  September 2019. Currently undergoing chemotherapy with Eribulin.     COMPARISON: MRI thoracic spine 4/3/2020, PET-CT 3/27/2020, MRI  cervical spine 1/31/2020, PET-CT 1/24/2020     FINDINGS:      HEAD/NECK:  There is no suspicious FDG uptake in the neck. Symmetric FDG uptake to  the aryepiglottic folds is favored to be inflammatory in etiology.     Trace mucosal thickening in the right maxillary sinus. The remaining  paranasal sinuses are relatively clear. The mastoid air cells are  clear. No masses, mass effect or pathologically enlarged lymph nodes  are evident. Small calcifications in or adjacent to the right palatine  tonsil are similar to prior and  suggest sequela of prior inflammation,  versus less likely sialoliths. No submandibular ductal ectasia. The  thyroid gland is unremarkable.     CHEST:  There is no suspicious FDG uptake in the chest. Generalized FDG uptake  throughout the esophagus is similar to prior, for example in the the  distal esophagus/small hiatal hernia with SUV max of 5.7 (series 5,  image 202).     The central tracheobronchial tree is patent. No pleural effusion or  pneumothorax. No focal consolidation. Heart size is within normal  limits. No pericardial effusion. Filling defects within the distal  main left pulmonary artery extending into the left lower lobe  segmental and branch arteries, the proximal left upper lobe segmental  arteries, as well as additional filling defects within the right lower  lobe segmental branch arteries. These are new since 3/27/2020, and are  consistent with pulmonary emboli. No enlarged or hypermetabolic  mediastinal, hilar, or axillary lymph nodes. Postsurgical changes of  right axillary lymph node dissection. Post surgical changes of  bilateral mastectomy with implant reconstruction.     ABDOMEN AND PELVIS:  There is no suspicious FDG uptake in the abdomen or pelvis.     The liver, pancreas, spleen, and adrenal glands are unremarkable. The  gallbladder is surgically absent. No intrahepatic or extra hepatic  biliary ductal dilatation. Symmetric nephrographic enhancement. No  hydronephrosis. Bladder and uterus are unremarkable. Normal caliber of  the small and large bowel. No fluid collection or free fluid. No  enlarged or hypermetabolic lymph nodes in the abdomen or pelvis.     LOWER EXTREMITIES:   No abnormal masses or hypermetabolic soft tissue lesions.     BONES:   Increased FDG uptake to the lytic lesion in the T9 lamina and T10  right pedicle, with SUV max 5.3 (series 5, image 206), previously 3.9.        Increased FDG uptake to the left anterior fifth rib at the  costochondral junction, SUV max 3.9  (series 5, image 211), previously  2.5. Additional chronic bilateral rib fractures with mild FDG uptake  are similar to prior.     Additional scattered lytic lesions in the calvarium, spine, pelvis,  and right femoral neck are similar to prior and demonstrate only the  levels of FDG uptake. For example (series 5):  - Image 110: Left side of C4 vertebral body, SUV max 2.7, previously  2.7.  - Image 328: Right iliac crest SUV max 2.7, previously 2.6.  - Image 341: RIght iliac crest SUV max 2.1, previously 2.2.  - Image 379: Right femoral neck SUV max 2.2, previously 2.9.     Multilevel degenerative changes in the spine. Unchanged severe  compression deformity of the L1 vertebral body with greater than 75%  central height loss. Relative photopenia to the T12-L5 vertebral  bodies on PET images corresponds with history of radiotherapy.                                                                         IMPRESSION: In this patient with a history of metastatic breast cancer  status post chemotherapy, radiotherapy, and bilateral mastectomies:     1. New acute bilateral pulmonary emboli in the distal left main  pulmonary artery extending in the left upper and lower lobes, and in  the segmental and branch arteries in the right lower lobe.     2. Increased FDG uptake to the lytic lesion in the T9-10 posterior  elements concerning for progression of viable tumor.     3. Increased FDG uptake to the anterior left 5th rib at the  costochondral junction, indeterminant. Differential diagnosis includes  degenerative uptake, posttraumatic changes, and progressive  metastasis. Trauma is felt less likely given absence of traumatic  findings on CT, although multiple additional chronic rib fractures are  seen.     4. Additional lytic lesions with low levels of FDG uptake are similar  to 3/27/2020 and most consistent with treated disease.     5. Generalized diffuse FDG uptake to the esophagus and small hiatal  hernia, most consistent  with inflammation/esophagitis.      ASSESSMENT AND PLAN:   1.  History of stage IIB, T2 N1 M0 invasive lobular carcinoma of the right breast.  It was initially diagnosed in 2006 and at that time it was hormone receptor positive, HER-2 negative.  She was treated on ECOG 2104 protocol with dose-dense Adriamycin, Cytoxan and Avastin x4 cycles followed by Taxol and Avastin x4 cycles followed by a Avastin for 1 year.  She also received radiation to the right breast which she completed in January 2007 (5040 cGy).  She took Aromasin from 2007 to 2014.    Now she has metastatic breast cancer with extensive involvement of the bones.  There is also a left lower lobe hypermetabolic lesion and mediastinal lymph nodes which are hypermetabolic.  The biopsy from the right iliac bone is consistent with metastatic lobular breast cancer but it is hormone receptor and HER-2 negative and PDL 1 is also negative.    She has received 3000 cGy of palliative radiation to T12-L5 from 9/6/2019 till 9/18/2019.    She was started on palliative Xeloda but she was unable to tolerate even the dose reduced medication.        We decided on repeating scans and MRI brain on 10/25/2019 showed no intracranial metastatic disease.   Multiple enhancing calvarial lesions may be increased in number and are suggestive of metastatic disease. Thinner imaging on the current study may identify the smaller lesions and may be responsible for identified more lesions.   There is a single focus of T2 hyperintense signal within the anterior right temporal lobe may represent interval demyelination. There is no evidence for active demyelination.    PET/CT on 11/1/2019 showed favorable response to therapy and Overall FDG uptake within scattered osseous metastases is decreased since 8/30/2019, particularly within the pelvis. Increased sclerotic appearance of L1 and L4 pathologic compression deformities, likely sequela of recently completed palliative radiation  therapy.    No significant FDG uptake in previously demonstrated hypermetabolic mediastinal lymph nodes. Biopsy negative on 9/5/2019.  There is also decreased FDG uptake in the left lower lobe (biopsy negative for malignancy), now with dense consolidation containing air bronchograms suggestive of infection versus aspiration.  There is diffuse FDG uptake within the esophagus, consistent with esophagitis.    Because of intolerance to Xeloda we decided on switching to weekly paclitaxel on 11/5/2019.    For better tolerance we decreased the dose of paclitaxel to 70 mg/m  with cycle #2.  She has completed 3 cycles of Taxol and the last chemotherapy was on 1/14/2020.      PET/CT on 1/24/2020 showed progression of the disease in some of the bone lesions including increase in size and lytic lesion within the vertebral body of C4 measuring 1 cm as opposed to 0.6 cm previously and a new central soft tissue nodule with SUV max 4.1 when previously it was non-hypermetabolic.  There is also some progression noted in the right proximal femur and right iliac bone.  There is decreased metabolic uptake in the right lamina of T9 with SUV max 4.7 when previously it was 8.0.  Other lesions are stable.    There are no pathologically enlarged mediastinal, hilar or axillary lymph nodes. Calcified subcarinal lymph nodes. There are no suspicious lung nodules or evidence for infection and previous left lower lobe consolidation has resolved.     CA 27-29 also had increased significantly and it was 257 on 1/28/2020.    Due to progression we switched to eribulin on 1/28/2020.        After 3 cycles, she had a repeat PET/CT on 3/27/2020 which showed a stable size of the bone metastatic lesions although the FDG avidity was less, consistent with treatment response.    She had MRI of the thoracic spine on 4/3/2020 and it was compared to the one which was done in August 2019 and it showed increased size of several metastatic lesions most notably at T9  but also at T5-T7.  Other lesions at T10, T11 and T12 appeared improved.    CA 27-29 was also down to 222 on 3/18/2020.    I believe overall this is consistent with a partial response to the treatment.  Overall she is tolerating eribulin well with some worsening of neuropathy and cytopenias.     We decided on continuing the chemotherapy with some modifications and she got cycle #4 with slightly decreased dose of eribulin to 1.2 mg/m2.      After 8 cycles of eribulin repeat PET CT on 7/17/2020 showed only minimally increased FDG uptake in T9 and T10 and in the left anterior fifth rib near the costochondral junction.  That is slightly decreased FDG avidity in the right femoral neck lesion.  Otherwise bone disease is a stable.  No other evidence of metastatic disease is found.    CA 27-29 on 6/30/2020 was 308.    We decided on continuing the same chemotherapy because overall clinical picture was consistent with stable to only minimally progressive disease and she was tolerating treatments well with decent quality of life.    She has received 9 cycles up until now.    She is due to start cycle #10 today.  We will give it to her if her neutrophil count is adequate.  Preliminary neutrophil count is 1.0.      Recently FDA approved sacituzumab govitecan-hziy (Trodelvy) for TNBC, based on the results of the phase II IMMU-132-01 clinical trial. So this might become an option for her in the future.     Leukopenia/neutropenia.  WBC count is 2.5.  Preliminary neutrophil count is 1.0.  We will follow the final report and if ANC is 1.0 or above, then we will give her the chemotherapy today otherwise we will delay by 1 week.    Anemia.  Hemoglobin is 11.6.  This is likely from chemotherapy.  She denies any issues with bleeding.  She feels well.  Continue to observe.    Nausea.  She had significant nausea with cycle number 9-day #1 chemotherapy.  She took Compazine and Ativan for 3 days with the last chemotherapy and did very well.   Continue the same.    Weight loss.  Previously she had lost significant weight but because this time her nausea was under much better control, she was eating very well and has regained the weight which she had lost.    Bilateral pulmonary emboli.  These were incidentally found on 7/17/2020.  She has remained asymptomatic.  Previously she was started on Lovenox but now she has switched to Eliquis.  She will require indefinite anticoagulation.        Neuropathy.  Overall neuropathy has stabilized after decreasing the dose of eribulin.  Continue to observe.  From the underlying multiple sclerosis she has chronic balance issues and heat and cold sensitivity.        Pain management.  Pain is well controlled.  I refilled her morphine today.  Continue to follow with palliative care.       Previously she got palliative radiation to T12-L5 3000 cGy in 10 fractions from 9/6/2019 till 9/18/2019.  She was evaluated by orthopedics Dr. Bipin Elliott on 11/1/2019 and conservative management was recommended.      Bone metastasis.  Continue Zometa every 3 months.  She last received on 6/30/2020.  Continue vitamin D and calcium.      Sleeping problems.  I refilled trazodone.      We did not address the following today.      Vision changes.    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  It was thought that this could be due to Taxol as patient reported to the ophthalmologist that she was on Taxol in September 2019 when her symptoms started.  But she was not on Taxol in September 2019 when she started noticing the visual changes so Taxol is not responsible for this.  The first dose of Taxol was on 11/5/2019.   Her vision is much better now.  She will continue to follow closely with ophthalmology.       Increased FDG ability in the colon.  She had FDG uptake in the cecum and ascending colon but no corresponding lesion was seen on the CT scan.  She is completely asymptomatic so at this time we  will continue to observe.    Fatigue.  This is from the cancer and chemotherapy.  I again encouraged her to do regular exercise.  Continue to follow with cancer rehab.        Discussion regarding healthcare directives.  On previous visit she told me that she will bring the health care directive for our records but she forgot so I told her to bring it on next visit.      Breast implant removal.  She tells me that she was planning to do breast implant removal because there was a recall on this.  Her surgery was scheduled for 9/24/2019.  Because of this new development of metastatic breast cancer and her recent radiation, surgery has been canceled.  We discussed that at this time treatment for metastatic breast cancer would take precedence over the other surgery as the other surgery would require her to be off chemotherapy for several weeks and in that case the chances of cancer progression would be high and I would not recommend that.    Multiple sclerosis.  She will continue to follow with her neurologist Dr. Quiles.  Currently multiple sclerosis is under control and currently she is not taking any medications.    RTC in 3 weeks to see nurse practitioner.  I will see her back in 6 weeks.        All questions answered.  She is agreeable and comfortable with the plan.        Dewayne Zepeda MD    Video start time. 8:07 AM  Video stop time. 8:18 AM      Again, thank you for allowing me to participate in the care of your patient.        Sincerely,        Dewayne Zepeda MD

## 2020-08-11 NOTE — PROGRESS NOTES
Infusion Nursing Note:  Shaneka Alvarez presents today for HOLD chemotherapy today.    Patient seen by provider today: Yes: Dr Zepeda   present during visit today: Not Applicable.    Intravenous Access:  Implanted Port.    Treatment Conditions:  Lab Results   Component Value Date    HGB 11.6 08/11/2020     Lab Results   Component Value Date    WBC 2.5 08/11/2020      Lab Results   Component Value Date    ANEU 0.9 08/11/2020     Lab Results   Component Value Date     08/11/2020      Lab Results   Component Value Date     08/11/2020                   Lab Results   Component Value Date    POTASSIUM 4.2 08/11/2020           Lab Results   Component Value Date    MAG 2.0 08/11/2020            Lab Results   Component Value Date    CR 0.67 08/11/2020                   Lab Results   Component Value Date    RAFAELA 8.8 08/11/2020                Lab Results   Component Value Date    BILITOTAL 0.3 08/11/2020           Lab Results   Component Value Date    ALBUMIN 3.3 08/11/2020                    Lab Results   Component Value Date    ALT 29 08/11/2020           Lab Results   Component Value Date    AST 19 08/11/2020       Magnesium 2.0    Results reviewed with Dr Zepeda.    GAYE Zepeda MD/ ROSALVA Brooks RN Delay chemotherapy for one week.    Post Infusion Assessment:  Reviewed neutropenic precautions with Shaneka. Also, discussed that there was nothing she personally could do to improve her neutrophil counts. Shaneka verbalized understanding of this teaching.      Discharge Plan:   Copy of AVS reviewed with patient and/or family.  Patient will return 8/18/20 for next appointment.  Patient discharged in stable condition accompanied by: self.  Departure Mode: Ambulatory.    Keira Brooks, RN

## 2020-08-11 NOTE — PROGRESS NOTES
ONCOLOGY FOLLOW UP NOTE: 8/11/2020        I took the history from reviewing the previous notes that she was following with Dr. Amaya.  I have copied and updated from prior notes.    ONCOLOGY History:    1.  January 2006:  Diagnosed with Stage IIB, T2 N1 M0 invasive lobular carcinoma of the right breast.  Final pathology showed a 4.5 x 3.8 x 2.5 cm, 01/14 lymph nodes positive.  Estrogen, progesterone receptor positive, HER-2 negative.     2.  Genetics.  BRCA1 and 2 mutations not detected. 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       THERAPY TO DATE:   1.  2006:  ECOG 2104 protocol of dose-dense Adriamycin, Cytoxan and Avastin x4 cycles followed by Taxol and Avastin x4 cycles.   2.  03/2007:  Completed 1 year Avastin.   3.  11/2006-01/2007:  Radiotherapy to the right breast of 5040 cGy.   4.  [id number]/[id number]:  Aromasin.  Stopped after moving to the [facility].   5.  01/2016:  Right modified radical mastectomy with latissimus dorsi flap reconstruction and a left prophylactic mastectomy with latissimus dorsi flap reconstruction.     She recently had MRI of the brain as a follow-up for multiple sclerosis and there were multiple calvarial lesions noted which was suspicious for metastatic disease.  There was no evidence of new multiple sclerosis lesions.  She had a PET scan on 8/30/2019 which showed widespread bone metastatic lesions (calvarium, spine, sacrum, pelvis, ribs most prominent at C7, T3, L1 and L4), hypermetabolic 3 cm lesion in LLL, and mediastinal/hilar LN. CEA wnl at 1.9 and C27-29 elevated to 415 (28 on 8/23/16). A CT guided biopsy of the right iliac bone was obtained on 9/4/19, pathology consistent with metastatic adenocarcinoma (breast), ER/OR negative, HER-2 negative PDL 1 < 1%. A second biopsy was take of the LLL nodule via EBUS on 9/5/19 did not show any malignancy.    C/T/L spine MRI 8/3/19 to 9/2/19 with numerous enhancing lesions of the C, T and L spine, largest around T9 and L1. No  evidence of cord compression. Has a L1 compression fracture and impending L4.     Received radiation T12-L5 3000 cGy in 10 fractions from 9/6/2019 till 9/18/2019.  Neurosurgery recommended no surgical intervention but to wear [place] brace when out of bed and HOB >30 degrees    She started palliative Xeloda 2000/1500 on 10/1/2019 but after just a few doses she noticed nausea, red eyes blurred vision, loss of appetite.  She stopped taking it after 5 doses and was seen by nurse practitioner on 10/7/2019 when she was improving.  At that point she was restarted on Xeloda at a lower dose of 1000 mg twice a day.  As she was not able to tolerate even the lower dose with extreme nausea and vomiting and feeling extremely fatigued and blurry vision, we decided on stopping Xeloda.    We decided on repeating scans and MRI brain on 10/25/2019 showed no intracranial metastatic disease.   Multiple enhancing calvarial lesions may be increased in number and are suggestive of metastatic disease. Thinner imaging on the current study may identify the smaller lesions and may be responsible for  identified more lesions.   There is a single focus of T2 hyperintense signal within the anterior right temporal lobe may represent interval demyelination. There is no evidence for active demyelination.    PET/CT on 11/1/2019 showed favorable response to therapy and Overall FDG uptake within scattered osseous metastases is decreased since 8/30/2019, particularly within the pelvis. Increased sclerotic appearance of L1 and L4 pathologic compression deformities, likely sequela of recently completed palliative radiation therapy.    No significant FDG uptake in previously demonstrated hypermetabolic mediastinal lymph nodes. Biopsy negative on 9/5/2019.  There is also decreased FDG uptake in the left lower lobe (biopsy negative for  malignancy), now with dense consolidation containing air bronchograms suggestive of infection versus aspiration.  There is  diffuse FDG uptake within the esophagus, consistent with esophagitis.    Because of intolerance to Xeloda we decided on switching to weekly paclitaxel on 11/5/2019.    C#2 Taxol 12/4/19- started with 70mg/m2 dose reduction    C#3 Taxol 12/30/2019     PET/CT on 1/24/2020 showed progression of the disease in some of the bone lesions including increase in size and lytic lesion within the vertebral body of C4 measuring 1 cm as opposed to 0.6 cm previously and a new central soft tissue nodule with SUV max 4.1 when previously it was non-hypermetabolic.  There is also some progression noted in the right proximal femur and right iliac bone.  There is decreased metabolic uptake in the right lamina of T9 with SUV max 4.7 when previously it was 8.0.  Other lesions are stable.    CA 27-29 also had increased significantly and it was 257 on 1/28/2020.    We decided on switching to eribulin on 1/28/2020.    C#2 2/18/2020  C#2 D#8 2/25/2020    C#3 D#1 3/18/2020 -delayed by 1 week as per patient's preference because she wanted to visit her family.    She had a repeat PET/CT on 3/27/2020 which showed stable size of the bone metastatic lesions although the FDG avidity was less, consistent with treatment response.    She had MRI of the thoracic spine on 4/3/2020 and it was compared to the one which was done in August 2019 and it showed increased size of several metastatic lesions most notably at T9 but also at T5-T7.  Other lesions at T10, T11 and T12 appeared improved.    CA 27-29 was also down to 222 on 3/18/2020.    Cycle #4 eribulin ( dose reduced to 1.2 mg/m2 )-4/7/2020-   Cycle #5 Eribulin ( 1.2 mg/m2 )- 4/28/2020   Cycle #6 Eribulin ( 1.2 mg/m2 )- 5/19/2020     Cycle #7 Eribulin ( 1.2 mg/m2 )- 6/9/2020    Cycle #8 Eribulin ( 1.2 mg/m2 )- 6/30/2020     She had a repeat PET scan on 7/17/2020 which showed incidental finding of new acute bilateral pulmonary embolism in the distal left main pulmonary artery extending in the left  upper and lower lobes and in the segmental and branch arteries in the right lower lobe.    It also showed mildly increased FDG avidity in the lytic lesion in the T9 lamina and right pedicle with SUV max 5.3, previously 3.9.  That is also slightly increased FDG uptake to the left anterior fifth rib at the costochondral junction SUV max 3.9, previously 2.5.  There is slightly decreased FDG uptake in the right femoral neck lesion SUV max 2.2, previously 2.9.  FDG uptake in other bone lesions is fairly stable.  No new metastasis are seen.    CA 27-29 was 308 on 6/30/2020.  It was 286 on 5/19/2020.    Overall this is consistent with only slight progression versus stable disease.    She was started on Lovenox.    Cycle #9 eribulin 7/21/2020. CA 27-29 was 238    C#10 eribulin 8/11/2020.        I had also recommended starting Zometa so she got dental clearance to start with that.  She received Zometa on 10/23/2019 and 1/14/2020 and 4/7/2020 and 6/30/2020 on a 3 monthly schedule.      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  It was thought that this could be due to Taxol as patient reported to the ophthalmologist that she was on Taxol in September 2019 when her symptoms started.  But she was not on Taxol in September 2019 when she started noticing the visual changes so Taxol is not responsible for this.  The first dose of Taxol was on 11/5/2019.       Interval history.  This is a video visit.    She is feeling well.  She had some nausea and vomiting after cycle number 9-day #1 but she mentioned that day #8 went well and she took Compazine and occasional Ativan for 3 days and did not have any nausea vomiting.  She had previously lost weight but regained that weight back and today weight 131 pounds.  She is now on Eliquis.  Denies any shortness of breath.  Energy is good.  Back pain is under decent control with MS Contin and a couple of immediate release morphine daily.  She is now on  "trazodone for sleep which is helping some.  Bowel movements are fine.  Denies any worsening neuropathy.  No fevers or infections.  No new swellings.  Denies bleeding issues.        ECOG 1    ROS:  Rest of the comprehensive review of the system was essentially unremarkable.          I reviewed other history in epic as below.       PAST MEDICAL HISTORY:     1.  Breast cancer  2.  Multiple sclerosis.   3.  Depression.   4.  Migraines.   5.  Hypertension.   6.  Cholecystectomy and umbilical hernia repair.     SOCIAL HISTORY: She smoked for 5 years but quit many years ago.  Drinks alcohol socially.  She lives with her .  She is a teacher in middle school.       FAMILY HISTORY: She mentions that her mother had breast cancer at 49.  Both grandmothers had breast cancer but she is not sure of the age.  A couple of cousins have cancers.  Patient has 3 children who are healthy.    Current Outpatient Medications   Medication     acetaminophen (TYLENOL) 500 MG tablet     apixaban ANTICOAGULANT (ELIQUIS) 5 MG tablet     busPIRone (BUSPAR) 15 MG tablet     calcium citrate-vitamin D (CITRACAL) 315-250 MG-UNIT TABS     Cholecalciferol (VITAMIN D3 PO)     cyclopentolate (CYCLOGYL) 1 % ophthalmic solution     diclofenac (VOLTAREN) 1 % topical gel     ibuprofen (ADVIL/MOTRIN) 600 MG tablet     LORazepam (ATIVAN) 0.5 MG tablet     magnesium 250 MG tablet     morphine (MS CONTIN) 15 MG CR tablet     morphine (MSIR) 15 MG IR tablet     naloxone (NARCAN) 1 mg/mL for intranasal kit (2 syringes with 2 mucosal atomizer device)     polyethylene glycol (MIRALAX/GLYCOLAX) packet     prochlorperazine (COMPAZINE) 10 MG tablet     propranolol (INDERAL LA) 60 MG 24 hr capsule     senna-docusate (SENOKOT-S/PERICOLACE) 8.6-50 MG tablet     syringe/needle, disp, (B-D INTEGRA SYRINGE) 23G X 1\" 3 ML MISC     traZODone (DESYREL) 50 MG tablet     venlafaxine (EFFEXOR-ER) 225 MG TB24 24 hr tablet     No current facility-administered medications for " this visit.      Facility-Administered Medications Ordered in Other Visits   Medication     heparin 100 UNIT/ML injection 5 mL     sodium chloride (PF) 0.9% PF flush 10-20 mL        Allergies   Allergen Reactions     Bactrim [Sulfamethoxazole W/Trimethoprim]      Internal bleeding     Copaxone [Glatiramer] Hives          PHYSICAL EXAMINATION:     There were no vitals taken for this visit.  Wt Readings from Last 4 Encounters:   07/28/20 55.6 kg (122 lb 8 oz)   07/21/20 59.1 kg (130 lb 6.4 oz)   07/17/20 59.9 kg (132 lb)   07/07/20 59 kg (130 lb)             Constitutional.  Does not seem to be in any acute distress.  Eyes.  No redness or discharge noted.  Respiratory.  Speaking in full sentences.  Breathing seems comfortable without any accessory use of muscles.    Skin.  Visualized his skin does not show any obvious rashes.  Musculoskeletal.  Range of motion for visualized areas is intact.  Neurological.  Alert and oriented x3.  Psychiatric.  Mood, mentation and affect are normal.  Decision making capacity is intact.      The rest of a comprehensive physical examination is deferred due to Public Health Emergency video visit restrictions.      Labs and Imaging.    Reviewed    Combined Report of:    PET and CT on  7/17/2020 3:52 PM :     1. PET of the neck, chest, abdomen, and pelvis.  2. PET CT Fusion for Attenuation Correction and Anatomical  Localization:    3. Diagnostic CT scan of the chest, abdomen, and pelvis with  intravenous contrast for interpretation.  3. CT of the chest, abdomen and pelvis obtained for diagnostic  interpretation.  4. 3D MIP and PET-CT fused images were processed on an independent  workstation and archived to PACS and reviewed by a radiologist.     Technique:     1. PET: The patient received 13.72 mCi of F-18-FDG; the serum glucose  was 97 prior to administration, body weight was 59 kg. Images were  evaluated in the axial, sagittal, and coronal planes as well as the  rotational whole body  MIP. Images were acquired from the Vertex to the  Feet.     UPTAKE WAS MEASURED AT 60 MINUTES.      BACKGROUND:  Liver SUV max= 3.5,   Aorta Blood SUV Max: 2.6.      2. CT: Volumetric acquisition for clinical interpretation of the  chest, abdomen, and pelvis acquired at 3 mm sections after the  uneventful administration of intravenous contrast. The chest, abdomen,  and pelvis were evaluated at 5 mm sections in bone, soft tissue, and  lung windows.       The patient received 80 cc. Of Isovue 370 intravenously and 500 mL  oral Breeza contrast for the examination.       3. 3D MIP and PET-CT fused images were processed on an independent  workstation and archived to PACS and reviewed by a radiologist.     INDICATION: Metastatic breast cancer     ADDITIONAL INFORMATION OBTAINED FROM EMR: 60-year-old woman with  history of invasive lobular carcinoma of the right breast diagnosed in  2006 treated with chemoradiation and hormonal therapy. Underwent  bilateral mastectomy 2016. Radiotherapy to the lumbar spine T12-L5 in  September 2019. Currently undergoing chemotherapy with Eribulin.     COMPARISON: MRI thoracic spine 4/3/2020, PET-CT 3/27/2020, MRI  cervical spine 1/31/2020, PET-CT 1/24/2020     FINDINGS:      HEAD/NECK:  There is no suspicious FDG uptake in the neck. Symmetric FDG uptake to  the aryepiglottic folds is favored to be inflammatory in etiology.     Trace mucosal thickening in the right maxillary sinus. The remaining  paranasal sinuses are relatively clear. The mastoid air cells are  clear. No masses, mass effect or pathologically enlarged lymph nodes  are evident. Small calcifications in or adjacent to the right palatine  tonsil are similar to prior and suggest sequela of prior inflammation,  versus less likely sialoliths. No submandibular ductal ectasia. The  thyroid gland is unremarkable.     CHEST:  There is no suspicious FDG uptake in the chest. Generalized FDG uptake  throughout the esophagus is similar to  prior, for example in the the  distal esophagus/small hiatal hernia with SUV max of 5.7 (series 5,  image 202).     The central tracheobronchial tree is patent. No pleural effusion or  pneumothorax. No focal consolidation. Heart size is within normal  limits. No pericardial effusion. Filling defects within the distal  main left pulmonary artery extending into the left lower lobe  segmental and branch arteries, the proximal left upper lobe segmental  arteries, as well as additional filling defects within the right lower  lobe segmental branch arteries. These are new since 3/27/2020, and are  consistent with pulmonary emboli. No enlarged or hypermetabolic  mediastinal, hilar, or axillary lymph nodes. Postsurgical changes of  right axillary lymph node dissection. Post surgical changes of  bilateral mastectomy with implant reconstruction.     ABDOMEN AND PELVIS:  There is no suspicious FDG uptake in the abdomen or pelvis.     The liver, pancreas, spleen, and adrenal glands are unremarkable. The  gallbladder is surgically absent. No intrahepatic or extra hepatic  biliary ductal dilatation. Symmetric nephrographic enhancement. No  hydronephrosis. Bladder and uterus are unremarkable. Normal caliber of  the small and large bowel. No fluid collection or free fluid. No  enlarged or hypermetabolic lymph nodes in the abdomen or pelvis.     LOWER EXTREMITIES:   No abnormal masses or hypermetabolic soft tissue lesions.     BONES:   Increased FDG uptake to the lytic lesion in the T9 lamina and T10  right pedicle, with SUV max 5.3 (series 5, image 206), previously 3.9.        Increased FDG uptake to the left anterior fifth rib at the  costochondral junction, SUV max 3.9 (series 5, image 211), previously  2.5. Additional chronic bilateral rib fractures with mild FDG uptake  are similar to prior.     Additional scattered lytic lesions in the calvarium, spine, pelvis,  and right femoral neck are similar to prior and demonstrate only  the  levels of FDG uptake. For example (series 5):  - Image 110: Left side of C4 vertebral body, SUV max 2.7, previously  2.7.  - Image 328: Right iliac crest SUV max 2.7, previously 2.6.  - Image 341: RIght iliac crest SUV max 2.1, previously 2.2.  - Image 379: Right femoral neck SUV max 2.2, previously 2.9.     Multilevel degenerative changes in the spine. Unchanged severe  compression deformity of the L1 vertebral body with greater than 75%  central height loss. Relative photopenia to the T12-L5 vertebral  bodies on PET images corresponds with history of radiotherapy.                                                                         IMPRESSION: In this patient with a history of metastatic breast cancer  status post chemotherapy, radiotherapy, and bilateral mastectomies:     1. New acute bilateral pulmonary emboli in the distal left main  pulmonary artery extending in the left upper and lower lobes, and in  the segmental and branch arteries in the right lower lobe.     2. Increased FDG uptake to the lytic lesion in the T9-10 posterior  elements concerning for progression of viable tumor.     3. Increased FDG uptake to the anterior left 5th rib at the  costochondral junction, indeterminant. Differential diagnosis includes  degenerative uptake, posttraumatic changes, and progressive  metastasis. Trauma is felt less likely given absence of traumatic  findings on CT, although multiple additional chronic rib fractures are  seen.     4. Additional lytic lesions with low levels of FDG uptake are similar  to 3/27/2020 and most consistent with treated disease.     5. Generalized diffuse FDG uptake to the esophagus and small hiatal  hernia, most consistent with inflammation/esophagitis.      ASSESSMENT AND PLAN:   1.  History of stage IIB, T2 N1 M0 invasive lobular carcinoma of the right breast.  It was initially diagnosed in 2006 and at that time it was hormone receptor positive, HER-2 negative.  She was treated on  ECOG 2104 protocol with dose-dense Adriamycin, Cytoxan and Avastin x4 cycles followed by Taxol and Avastin x4 cycles followed by a Avastin for 1 year.  She also received radiation to the right breast which she completed in January 2007 (5040 cGy).  She took Aromasin from 2007 to 2014.    Now she has metastatic breast cancer with extensive involvement of the bones.  There is also a left lower lobe hypermetabolic lesion and mediastinal lymph nodes which are hypermetabolic.  The biopsy from the right iliac bone is consistent with metastatic lobular breast cancer but it is hormone receptor and HER-2 negative and PDL 1 is also negative.    She has received 3000 cGy of palliative radiation to T12-L5 from 9/6/2019 till 9/18/2019.    She was started on palliative Xeloda but she was unable to tolerate even the dose reduced medication.        We decided on repeating scans and MRI brain on 10/25/2019 showed no intracranial metastatic disease.   Multiple enhancing calvarial lesions may be increased in number and are suggestive of metastatic disease. Thinner imaging on the current study may identify the smaller lesions and may be responsible for identified more lesions.   There is a single focus of T2 hyperintense signal within the anterior right temporal lobe may represent interval demyelination. There is no evidence for active demyelination.    PET/CT on 11/1/2019 showed favorable response to therapy and Overall FDG uptake within scattered osseous metastases is decreased since 8/30/2019, particularly within the pelvis. Increased sclerotic appearance of L1 and L4 pathologic compression deformities, likely sequela of recently completed palliative radiation therapy.    No significant FDG uptake in previously demonstrated hypermetabolic mediastinal lymph nodes. Biopsy negative on 9/5/2019.  There is also decreased FDG uptake in the left lower lobe (biopsy negative for malignancy), now with dense consolidation containing air  bronchograms suggestive of infection versus aspiration.  There is diffuse FDG uptake within the esophagus, consistent with esophagitis.    Because of intolerance to Xeloda we decided on switching to weekly paclitaxel on 11/5/2019.    For better tolerance we decreased the dose of paclitaxel to 70 mg/m  with cycle #2.  She has completed 3 cycles of Taxol and the last chemotherapy was on 1/14/2020.      PET/CT on 1/24/2020 showed progression of the disease in some of the bone lesions including increase in size and lytic lesion within the vertebral body of C4 measuring 1 cm as opposed to 0.6 cm previously and a new central soft tissue nodule with SUV max 4.1 when previously it was non-hypermetabolic.  There is also some progression noted in the right proximal femur and right iliac bone.  There is decreased metabolic uptake in the right lamina of T9 with SUV max 4.7 when previously it was 8.0.  Other lesions are stable.    There are no pathologically enlarged mediastinal, hilar or axillary lymph nodes. Calcified subcarinal lymph nodes. There are no suspicious lung nodules or evidence for infection and previous left lower lobe consolidation has resolved.     CA 27-29 also had increased significantly and it was 257 on 1/28/2020.    Due to progression we switched to eribulin on 1/28/2020.        After 3 cycles, she had a repeat PET/CT on 3/27/2020 which showed a stable size of the bone metastatic lesions although the FDG avidity was less, consistent with treatment response.    She had MRI of the thoracic spine on 4/3/2020 and it was compared to the one which was done in August 2019 and it showed increased size of several metastatic lesions most notably at T9 but also at T5-T7.  Other lesions at T10, T11 and T12 appeared improved.    CA 27-29 was also down to 222 on 3/18/2020.    I believe overall this is consistent with a partial response to the treatment.  Overall she is tolerating eribulin well with some worsening of  neuropathy and cytopenias.     We decided on continuing the chemotherapy with some modifications and she got cycle #4 with slightly decreased dose of eribulin to 1.2 mg/m2.      After 8 cycles of eribulin repeat PET CT on 7/17/2020 showed only minimally increased FDG uptake in T9 and T10 and in the left anterior fifth rib near the costochondral junction.  That is slightly decreased FDG avidity in the right femoral neck lesion.  Otherwise bone disease is a stable.  No other evidence of metastatic disease is found.    CA 27-29 on 6/30/2020 was 308.    We decided on continuing the same chemotherapy because overall clinical picture was consistent with stable to only minimally progressive disease and she was tolerating treatments well with decent quality of life.    She has received 9 cycles up until now.    She is due to start cycle #10 today.  We will give it to her if her neutrophil count is adequate.  Preliminary neutrophil count is 1.0.      Recently FDA approved sacituzumab govitecan-hziy (Trodelvy) for TNBC, based on the results of the phase II IMMU-132-01 clinical trial. So this might become an option for her in the future.     Leukopenia/neutropenia.  WBC count is 2.5.  Preliminary neutrophil count is 1.0.  We will follow the final report and if ANC is 1.0 or above, then we will give her the chemotherapy today otherwise we will delay by 1 week.    Anemia.  Hemoglobin is 11.6.  This is likely from chemotherapy.  She denies any issues with bleeding.  She feels well.  Continue to observe.    Nausea.  She had significant nausea with cycle number 9-day #1 chemotherapy.  She took Compazine and Ativan for 3 days with the last chemotherapy and did very well.  Continue the same.    Weight loss.  Previously she had lost significant weight but because this time her nausea was under much better control, she was eating very well and has regained the weight which she had lost.    Bilateral pulmonary emboli.  These were  incidentally found on 7/17/2020.  She has remained asymptomatic.  Previously she was started on Lovenox but now she has switched to Eliquis.  She will require indefinite anticoagulation.        Neuropathy.  Overall neuropathy has stabilized after decreasing the dose of eribulin.  Continue to observe.  From the underlying multiple sclerosis she has chronic balance issues and heat and cold sensitivity.        Pain management.  Pain is well controlled.  I refilled her morphine today.  Continue to follow with palliative care.       Previously she got palliative radiation to T12-L5 3000 cGy in 10 fractions from 9/6/2019 till 9/18/2019.  She was evaluated by orthopedics Dr. Bipin Elliott on 11/1/2019 and conservative management was recommended.      Bone metastasis.  Continue Zometa every 3 months.  She last received on 6/30/2020.  Continue vitamin D and calcium.      Sleeping problems.  I refilled trazodone.      We did not address the following today.      Vision changes.    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  It was thought that this could be due to Taxol as patient reported to the ophthalmologist that she was on Taxol in September 2019 when her symptoms started.  But she was not on Taxol in September 2019 when she started noticing the visual changes so Taxol is not responsible for this.  The first dose of Taxol was on 11/5/2019.   Her vision is much better now.  She will continue to follow closely with ophthalmology.       Increased FDG ability in the colon.  She had FDG uptake in the cecum and ascending colon but no corresponding lesion was seen on the CT scan.  She is completely asymptomatic so at this time we will continue to observe.    Fatigue.  This is from the cancer and chemotherapy.  I again encouraged her to do regular exercise.  Continue to follow with cancer rehab.        Discussion regarding healthcare directives.  On previous visit she told me that she  will bring the health care directive for our records but she forgot so I told her to bring it on next visit.      Breast implant removal.  She tells me that she was planning to do breast implant removal because there was a recall on this.  Her surgery was scheduled for 9/24/2019.  Because of this new development of metastatic breast cancer and her recent radiation, surgery has been canceled.  We discussed that at this time treatment for metastatic breast cancer would take precedence over the other surgery as the other surgery would require her to be off chemotherapy for several weeks and in that case the chances of cancer progression would be high and I would not recommend that.    Multiple sclerosis.  She will continue to follow with her neurologist Dr. Quiles.  Currently multiple sclerosis is under control and currently she is not taking any medications.    RTC in 3 weeks to see nurse practitioner.  I will see her back in 6 weeks.        All questions answered.  She is agreeable and comfortable with the plan.        Dewayne Zepeda MD    Video start time. 8:07 AM  Video stop time. 8:18 AM    Addendum:  ANC is 0.9. we will delay chemo by 1 week.  Dewayne Zepeda MD

## 2020-08-11 NOTE — NURSING NOTE
"Shaneka Alvarez is a 60 year old female who is being evaluated via a billable video visit.      The patient has been notified of following:     \"This video visit will be conducted via a call between you and your physician/provider. We have found that certain health care needs can be provided without the need for an in-person physical exam.  This service lets us provide the care you need with a video conversation.  If a prescription is necessary we can send it directly to your pharmacy.  If lab work is needed we can place an order for that and you can then stop by our lab to have the test done at a later time.    Video visits are billed at different rates depending on your insurance coverage.  Please reach out to your insurance provider with any questions.    If during the course of the call the physician/provider feels a video visit is not appropriate, you will not be charged for this service.\"    Patient has given verbal consent for Video visit? Yes  How would you like to obtain your AVS? MyChart  If you are dropped from the video visit, the video invite should be resent to: MyChart  Will anyone else be joining your video visit? No        Video-Visit Details    Type of service:  Video Visit    Originating Location (pt. Location): Other In clinic prior to tx    Distant Location (provider location):  [facility]     Platform used for Video Visit: Imtiaz     Checklist Positives   Trouble sleeping - takes trazodone   Little bir of weakness    NEEDS REFILLS   Morphine ER   Trazodone    NO Concerns    Eliza Thakur CMA        "

## 2020-08-17 ENCOUNTER — VIRTUAL VISIT (OUTPATIENT)
Dept: ONCOLOGY | Facility: CLINIC | Age: 60
End: 2020-08-17
Payer: COMMERCIAL

## 2020-08-17 DIAGNOSIS — F41.9 ANXIETY: ICD-10-CM

## 2020-08-17 DIAGNOSIS — C50.911 BILATERAL MALIGNANT NEOPLASM OF BREAST IN FEMALE, UNSPECIFIED ESTROGEN RECEPTOR STATUS, UNSPECIFIED SITE OF BREAST (H): ICD-10-CM

## 2020-08-17 DIAGNOSIS — G47.09 OTHER INSOMNIA: ICD-10-CM

## 2020-08-17 DIAGNOSIS — I26.99 PULMONARY EMBOLISM WITHOUT ACUTE COR PULMONALE, UNSPECIFIED CHRONICITY, UNSPECIFIED PULMONARY EMBOLISM TYPE (H): ICD-10-CM

## 2020-08-17 DIAGNOSIS — R11.0 NAUSEA: ICD-10-CM

## 2020-08-17 DIAGNOSIS — C50.912 BILATERAL MALIGNANT NEOPLASM OF BREAST IN FEMALE, UNSPECIFIED ESTROGEN RECEPTOR STATUS, UNSPECIFIED SITE OF BREAST (H): ICD-10-CM

## 2020-08-17 DIAGNOSIS — C79.51 BONE METASTASES: ICD-10-CM

## 2020-08-17 DIAGNOSIS — C50.919 METASTATIC BREAST CANCER: Primary | ICD-10-CM

## 2020-08-17 DIAGNOSIS — E87.6 HYPOKALEMIA: ICD-10-CM

## 2020-08-17 PROCEDURE — 99213 OFFICE O/P EST LOW 20 MIN: CPT | Mod: GT | Performed by: NURSE PRACTITIONER

## 2020-08-17 RX ORDER — DIPHENHYDRAMINE HYDROCHLORIDE 50 MG/ML
50 INJECTION INTRAMUSCULAR; INTRAVENOUS
Status: CANCELLED
Start: 2020-08-18

## 2020-08-17 RX ORDER — EPINEPHRINE 1 MG/ML
0.3 INJECTION, SOLUTION INTRAMUSCULAR; SUBCUTANEOUS EVERY 5 MIN PRN
Status: CANCELLED | OUTPATIENT
Start: 2020-08-18

## 2020-08-17 RX ORDER — ALBUTEROL SULFATE 90 UG/1
1-2 AEROSOL, METERED RESPIRATORY (INHALATION)
Status: CANCELLED
Start: 2020-08-18

## 2020-08-17 RX ORDER — HEPARIN SODIUM (PORCINE) LOCK FLUSH IV SOLN 100 UNIT/ML 100 UNIT/ML
5 SOLUTION INTRAVENOUS
Status: CANCELLED | OUTPATIENT
Start: 2020-08-18

## 2020-08-17 RX ORDER — MEPERIDINE HYDROCHLORIDE 25 MG/ML
25 INJECTION INTRAMUSCULAR; INTRAVENOUS; SUBCUTANEOUS EVERY 30 MIN PRN
Status: CANCELLED | OUTPATIENT
Start: 2020-08-18

## 2020-08-17 RX ORDER — NALOXONE HYDROCHLORIDE 0.4 MG/ML
.1-.4 INJECTION, SOLUTION INTRAMUSCULAR; INTRAVENOUS; SUBCUTANEOUS
Status: CANCELLED | OUTPATIENT
Start: 2020-08-18

## 2020-08-17 RX ORDER — ALBUTEROL SULFATE 0.83 MG/ML
2.5 SOLUTION RESPIRATORY (INHALATION)
Status: CANCELLED | OUTPATIENT
Start: 2020-08-18

## 2020-08-17 RX ORDER — HEPARIN SODIUM,PORCINE 10 UNIT/ML
5 VIAL (ML) INTRAVENOUS
Status: CANCELLED | OUTPATIENT
Start: 2020-08-18

## 2020-08-17 RX ORDER — METHYLPREDNISOLONE SODIUM SUCCINATE 125 MG/2ML
125 INJECTION, POWDER, LYOPHILIZED, FOR SOLUTION INTRAMUSCULAR; INTRAVENOUS
Status: CANCELLED
Start: 2020-08-18

## 2020-08-17 RX ORDER — SODIUM CHLORIDE 9 MG/ML
1000 INJECTION, SOLUTION INTRAVENOUS CONTINUOUS PRN
Status: CANCELLED
Start: 2020-08-18

## 2020-08-17 RX ORDER — EPINEPHRINE 0.3 MG/.3ML
0.3 INJECTION SUBCUTANEOUS EVERY 5 MIN PRN
Status: CANCELLED | OUTPATIENT
Start: 2020-08-18

## 2020-08-17 RX ORDER — LORAZEPAM 2 MG/ML
0.5 INJECTION INTRAMUSCULAR EVERY 4 HOURS PRN
Status: CANCELLED
Start: 2020-08-18

## 2020-08-17 NOTE — PROGRESS NOTES
Oncology Follow Up Visit: August 17, 2020      Oncologist: Dr Dewayne Zepeda  PCP: Mohit Barcenas    Diagnosis: Metastatic Breast Cancer to brain and spine  Shaneka Alvarez is a 61 yo female who was diagnosed with Stage IIB, T2 N1 M0 invasive lobular carcinoma of the right breast  In January 2006.  Final pathology showed a 4.5 x 3.8 x 2.5 cm, 01/14 lymph nodes positive.  Estrogen, progesterone receptor positive, HER-2 negative.     Genetics- BRCA1 and 2 mutations not detected. 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  There was no evidence of new multiple sclerosis lesions.  PET scan on 8/30/2019 which showed widespread bone metastatic lesions (calvarium, spine, sacrum, pelvis, ribs most prominent at C7, T3, L1 and L4), hypermetabolic 3 cm lesion in LLL, and mediastinal/hilar LN. CEA wnl at 1.9 and C27-29 elevated to 415 (28 on 8/23/16). A CT guided biopsy of the right iliac bone was obtained on 9/4/19, pathology consistent with metastatic adenocarcinoma (breast), ER/SD negative, HER-2 negative PDL 1 < 1%. A second biopsy was take of the LLL nodule via EBUS on 9/5/19 did not show any malignancy.  C/T/L spine MRI 8/3/19 to 9/2/19 with numerous enhancing lesions of the C, T and L spine, largest around T9 and L1. No evidence of cord compression. Has a L1 compression fracture and impending L4. completed radiation therapy to T12-L5.-Neurosurgery recommended no surgical intervention but to wear Gorge brace when out of bed and HOB >30 degrees  Treatment:   2006:  ECOG 2104 protocol of dose-dense Adriamycin, Cytoxan and Avastin x4 cycles followed by Taxol and Avastin x4 cycles.   03/2007:  Completed 1 year Avastin.   11/2006-01/2007:  Radiotherapy to the right breast of 5040 cGy.   03/[id number]:  Aromasin.  Stopped after moving to the [place].   01/2016:  Right  modified radical mastectomy with latissimus dorsi flap reconstruction and a left prophylactic mastectomy with latissimus dorsi flap reconstruction.   9/6/2019 till 9/18/2019 Received radiation T12-L5 3000 cGy in 10 fractions   -Neurosurgery recommended no surgical intervention but to wear Gorge brace when out of bed and HOB >30 degrees  9/18/2019 completed T12-L5 radiation in 10 fractions =3000 cGy  10/1/2019 Xeloda x 2 trials lowering dose to 1000 mg bid  10/23/2019 Began every 3 month Zometa  11/5/2019- 1/14/2020  weekly paclitaxel x 3 cycles  1/28/2020 Eribulin    Interval History: Ms. Alvarez is seen today for review prior to delayed cycle 10 of Eribulin due to neutropenia. Pt shares she was feeling great this week and eating well with no adverse effects to the delayed treatment. She denies fevers, increased fatigue or weakness. She was eating well and has no nausea or bowel/bladder changes. She continues to use her treadmill 10 min 2 x daily and play with dogs for her daily exercise. Back pain still noted but controlled with Volteran gel and patches and is able to seep well with trazodone. Anxiety treated with effexor. Feels she had a great week.     Rest of comprehensive and complete ROS is reviewed and is negative.   Past Medical History:   Diagnosis Date     Breast cancer (H)     Age 44     Common migraine      H/O bilateral mastectomy      Mild major depression (H)      Multiple sclerosis (H)      Current Outpatient Medications   Medication     acetaminophen (TYLENOL) 500 MG tablet     apixaban ANTICOAGULANT (ELIQUIS) 5 MG tablet     busPIRone (BUSPAR) 15 MG tablet     calcium citrate-vitamin D (CITRACAL) 315-250 MG-UNIT TABS     Cholecalciferol (VITAMIN D3 PO)     cyclopentolate (CYCLOGYL) 1 % ophthalmic solution     diclofenac (VOLTAREN) 1 % topical gel     ibuprofen (ADVIL/MOTRIN) 600 MG tablet     LORazepam (ATIVAN) 0.5 MG tablet     magnesium 250 MG tablet     morphine (MS CONTIN) 15 MG CR tablet      "morphine (MSIR) 15 MG IR tablet     naloxone (NARCAN) 1 mg/mL for intranasal kit (2 syringes with 2 mucosal atomizer device)     polyethylene glycol (MIRALAX/GLYCOLAX) packet     prochlorperazine (COMPAZINE) 10 MG tablet     propranolol (INDERAL LA) 60 MG 24 hr capsule     senna-docusate (SENOKOT-S/PERICOLACE) 8.6-50 MG tablet     syringe/needle, disp, (B-D INTEGRA SYRINGE) 23G X 1\" 3 ML MISC     traZODone (DESYREL) 50 MG tablet     venlafaxine (EFFEXOR-ER) 225 MG TB24 24 hr tablet     No current facility-administered medications for this visit.      Allergies   Allergen Reactions     Bactrim [Sulfamethoxazole W/Trimethoprim]      Internal bleeding     Copaxone [Glatiramer] Hives       Physical Exam:There were no vitals taken for this visit.   Constitutional: Alert, healthy, and in no distress as seated.   ENT: Eyes bright , No mouth sores- has mask on  Neck: Supple, No adenopathy.Thyroid symmetric  Cardiac: Heart rate and rhythm is regular and strong without murmur- rate mildly elevated  Respiratory: Breathing easy. Lung sounds clear to auscultation- no sign of SOB as seated  Abdomen: Soft, non-tender, BS normal. No masses or organomegaly  MS: Muscle tone normal, extremities normal with no edema.   Skin: No suspicious lesions or rashes  Neuro: Sensory grossly WNL, gait normal.   Lymph: Normal ant/post cervical, axillary, supraclavicular nodes  Psych: Mentation appears normal and affect normal/bright and smiling    Laboratory Results:   No results found for any visits on 08/17/20.  Assessment and Plan:   Metastatic Breast Cancer to brain and spine-Pt continues on with use of Eribulin and is now due for cycle 10- this is delayed from last week due to neutropenia. She had a great additional week off without signs of infection or additional weakness. No other concerns that would limit treatment or show need for change in treatment plan unless not able to meet lab goals prior to treatment tomorrow.     Patient will " return in 3 weeks with review prior to infusion with Dr. Zepeda.  Nausea and vomiting- pt using compazine and then stacking ativan if needed and feels doing well with nausea noted on first couple days only now.   Bone mets- Pt completed radiation therapy to T12-L5 on 9/18/2019. Using Zometa every 3 months which is due after 9/30/2020.  She confirms using calcium plus vitamin D daily for support.   Back pain/bone metastasis- has palliative care oversight and is currently on MS Contin 15mg nightly with ibuprofen and MSIR as needed with Voltaren gel and pain patches if breakthrough pain.   Bilateral pulmonary embolism-  Found with 7/2020 imaging. Continues on Eliquis- plan is for ongoing anticoagulation- no additional bruising.  Anxiety/Depression- using  Effexor and Trazodone- trazodone also used nightly for sleep. Given praise for her daily exercise.  MS - currently not on treatment for her MS but feels it is not a problem at this time- has not seen neurology for > 1 year.   Based upon CDC guidance and to observe social distancing in the setting of the COVID-19 pandemic, the patient was seen virtually via video visit.   Location of pt- home  Location of provider- office at clinic  Start of visit - 9:50 am  End of visit- 10:01 am  Total Time 11 min visit  Annie Bailon CNp

## 2020-08-17 NOTE — NURSING NOTE
"Shaneka Alvarez is a 60 year old female who is being evaluated via a billable video visit.      The patient has been notified of following:     \"This video visit will be conducted via a call between you and your physician/provider. We have found that certain health care needs can be provided without the need for an in-person physical exam.  This service lets us provide the care you need with a video conversation.  If a prescription is necessary we can send it directly to your pharmacy.  If lab work is needed we can place an order for that and you can then stop by our lab to have the test done at a later time.    Video visits are billed at different rates depending on your insurance coverage.  Please reach out to your insurance provider with any questions.    If during the course of the call the physician/provider feels a video visit is not appropriate, you will not be charged for this service.\"    Patient has given verbal consent for Video visit? Yes  How would you like to obtain your AVS? MyChart  If you are dropped from the video visit, the video invite should be resent to: MyCHart  Will anyone else be joining your video visit? No      Video-Visit Details    Type of service:  Video Visit    Originating Location (pt. Location): Home    Distant Location (provider location):  [facility]     Platform used for Video Visit: Imtiaz Thakur CMA        "

## 2020-08-17 NOTE — LETTER
8/17/2020         RE: Shaneka Alvarez  [address] [facility] [address]        Dear Colleague,    Thank you for referring your patient, Shaneka Alvarez, to the [facility]. Please see a copy of my visit note below.    Oncology Follow Up Visit: August 17, 2020      Oncologist: Dr Dewayne Zepeda  PCP: Mohit Barcenas    Diagnosis: Metastatic Breast Cancer to brain and spine  Shaneka Alvarez is a 59 yo female who was diagnosed with Stage IIB, T2 N1 M0 invasive lobular carcinoma of the right breast  In January 2006.  Final pathology showed a 4.5 x 3.8 x 2.5 cm, 01/14 lymph nodes positive.  Estrogen, progesterone receptor positive, HER-2 negative.     Genetics- BRCA1 and 2 mutations not detected. 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  There was no evidence of new multiple sclerosis lesions.  PET scan on 8/30/2019 which showed widespread bone metastatic lesions (calvarium, spine, sacrum, pelvis, ribs most prominent at C7, T3, L1 and L4), hypermetabolic 3 cm lesion in LLL, and mediastinal/hilar LN. CEA wnl at 1.9 and C27-29 elevated to 415 (28 on 8/23/16). A CT guided biopsy of the right iliac bone was obtained on 9/4/19, pathology consistent with metastatic adenocarcinoma (breast), ER/MD negative, HER-2 negative PDL 1 < 1%. A second biopsy was take of the LLL nodule via EBUS on 9/5/19 did not show any malignancy.  C/T/L spine MRI 8/3/19 to 9/2/19 with numerous enhancing lesions of the C, T and L spine, largest around T9 and L1. No evidence of cord compression. Has a L1 compression fracture and impending L4. completed radiation therapy to T12-L5.-Neurosurgery recommended no surgical intervention but to wear Gorge brace when out of bed and HOB >30 degrees  Treatment:   2006:  ECOG 2104 protocol of dose-dense Adriamycin, Cytoxan and  Avastin x4 cycles followed by Taxol and Avastin x4 cycles.   03/2007:  Completed 1 year Avastin.   11/2006-01/2007:  Radiotherapy to the right breast of 5040 cGy.   03/[id number]:  Aromasin.  Stopped after moving to the [facility].   01/2016:  Right modified radical mastectomy with latissimus dorsi flap reconstruction and a left prophylactic mastectomy with latissimus dorsi flap reconstruction.   9/6/2019 till 9/18/2019 Received radiation T12-L5 3000 cGy in 10 fractions   -Neurosurgery recommended no surgical intervention but to wear [place] brace when out of bed and HOB >30 degrees  9/18/2019 completed T12-L5 radiation in 10 fractions =3000 cGy  10/1/2019 Xeloda x 2 trials lowering dose to 1000 mg bid  10/23/2019 Began every 3 month Zometa  11/5/2019- 1/14/2020  weekly paclitaxel x 3 cycles  1/28/2020 Eribulin    Interval History: Ms. Alvarez is seen today for review prior to delayed cycle 10 of Eribulin due to neutropenia. Pt shares she was feeling great this week and eating well with no adverse effects to the delayed treatment. She denies fevers, increased fatigue or weakness. She was eating well and has no nausea or bowel/bladder changes. She continues to use her treadmill 10 min 2 x daily and play with dogs for her daily exercise. Back pain still noted but controlled with Volteran gel and patches and is able to seep well with trazodone. Anxiety treated with effexor. Feels she had a great week.     Rest of comprehensive and complete ROS is reviewed and is negative.   Past Medical History:   Diagnosis Date     Breast cancer (H)     Age 44     Common migraine      H/O bilateral mastectomy      Mild major depression (H)      Multiple sclerosis (H)      Current Outpatient Medications   Medication     acetaminophen (TYLENOL) 500 MG tablet     apixaban ANTICOAGULANT (ELIQUIS) 5 MG tablet     busPIRone (BUSPAR) 15 MG tablet     calcium citrate-vitamin D (CITRACAL) 315-250 MG-UNIT TABS     Cholecalciferol (VITAMIN D3  "PO)     cyclopentolate (CYCLOGYL) 1 % ophthalmic solution     diclofenac (VOLTAREN) 1 % topical gel     ibuprofen (ADVIL/MOTRIN) 600 MG tablet     LORazepam (ATIVAN) 0.5 MG tablet     magnesium 250 MG tablet     morphine (MS CONTIN) 15 MG CR tablet     morphine (MSIR) 15 MG IR tablet     naloxone (NARCAN) 1 mg/mL for intranasal kit (2 syringes with 2 mucosal atomizer device)     polyethylene glycol (MIRALAX/GLYCOLAX) packet     prochlorperazine (COMPAZINE) 10 MG tablet     propranolol (INDERAL LA) 60 MG 24 hr capsule     senna-docusate (SENOKOT-S/PERICOLACE) 8.6-50 MG tablet     syringe/needle, disp, (B-D INTEGRA SYRINGE) 23G X 1\" 3 ML MISC     traZODone (DESYREL) 50 MG tablet     venlafaxine (EFFEXOR-ER) 225 MG TB24 24 hr tablet     No current facility-administered medications for this visit.      Allergies   Allergen Reactions     Bactrim [Sulfamethoxazole W/Trimethoprim]      Internal bleeding     Copaxone [Glatiramer] Hives       Physical Exam:There were no vitals taken for this visit.   Constitutional: Alert, healthy, and in no distress as seated.   ENT: Eyes bright , No mouth sores- has mask on  Neck: Supple, No adenopathy.Thyroid symmetric  Cardiac: Heart rate and rhythm is regular and strong without murmur- rate mildly elevated  Respiratory: Breathing easy. Lung sounds clear to auscultation- no sign of SOB as seated  Abdomen: Soft, non-tender, BS normal. No masses or organomegaly  MS: Muscle tone normal, extremities normal with no edema.   Skin: No suspicious lesions or rashes  Neuro: Sensory grossly WNL, gait normal.   Lymph: Normal ant/post cervical, axillary, supraclavicular nodes  Psych: Mentation appears normal and affect normal/bright and smiling    Laboratory Results:   No results found for any visits on 08/17/20.  Assessment and Plan:   Metastatic Breast Cancer to brain and spine-Pt continues on with use of Eribulin and is now due for cycle 10- this is delayed from last week due to neutropenia. She " had a great additional week off without signs of infection or additional weakness. No other concerns that would limit treatment or show need for change in treatment plan unless not able to meet lab goals prior to treatment tomorrow.     Patient will return in 3 weeks with review prior to infusion with Dr. Zepeda.  Nausea and vomiting- pt using compazine and then stacking ativan if needed and feels doing well with nausea noted on first couple days only now.   Bone mets- Pt completed radiation therapy to T12-L5 on 9/18/2019. Using Zometa every 3 months which is due after 9/30/2020.  She confirms using calcium plus vitamin D daily for support.   Back pain/bone metastasis- has palliative care oversight and is currently on MS Contin 15mg nightly with ibuprofen and MSIR as needed with Voltaren gel and pain patches if breakthrough pain.   Bilateral pulmonary embolism-  Found with 7/2020 imaging. Continues on Eliquis- plan is for ongoing anticoagulation- no additional bruising.  Anxiety/Depression- using  Effexor and Trazodone- trazodone also used nightly for sleep. Given praise for her daily exercise.  MS - currently not on treatment for her MS but feels it is not a problem at this time- has not seen neurology for > 1 year.   Based upon CDC guidance and to observe social distancing in the setting of the COVID-19 pandemic, the patient was seen virtually via video visit.   Location of pt- home  Location of provider- office at clinic  Start of visit - 9:50 am  End of visit- 10:01 am  Total Time 11 min visit  Annie Bailon CNp        Again, thank you for allowing me to participate in the care of your patient.        Sincerely,        Annie Bailon, TAY, APRN CNP

## 2020-08-18 ENCOUNTER — INFUSION THERAPY VISIT (OUTPATIENT)
Dept: INFUSION THERAPY | Facility: CLINIC | Age: 60
End: 2020-08-18
Payer: COMMERCIAL

## 2020-08-18 ENCOUNTER — ONCOLOGY VISIT (OUTPATIENT)
Dept: ONCOLOGY | Facility: CLINIC | Age: 60
End: 2020-08-18
Payer: COMMERCIAL

## 2020-08-18 VITALS
OXYGEN SATURATION: 99 % | BODY MASS INDEX: 23.96 KG/M2 | RESPIRATION RATE: 16 BRPM | WEIGHT: 131 LBS | SYSTOLIC BLOOD PRESSURE: 110 MMHG | HEART RATE: 62 BPM | TEMPERATURE: 97.7 F | DIASTOLIC BLOOD PRESSURE: 72 MMHG

## 2020-08-18 DIAGNOSIS — E87.6 HYPOKALEMIA: Primary | ICD-10-CM

## 2020-08-18 DIAGNOSIS — C50.919 METASTATIC BREAST CANCER: ICD-10-CM

## 2020-08-18 DIAGNOSIS — C50.911 BILATERAL MALIGNANT NEOPLASM OF BREAST IN FEMALE, UNSPECIFIED ESTROGEN RECEPTOR STATUS, UNSPECIFIED SITE OF BREAST (H): ICD-10-CM

## 2020-08-18 DIAGNOSIS — C50.912 BILATERAL MALIGNANT NEOPLASM OF BREAST IN FEMALE, UNSPECIFIED ESTROGEN RECEPTOR STATUS, UNSPECIFIED SITE OF BREAST (H): ICD-10-CM

## 2020-08-18 LAB
ALBUMIN SERPL-MCNC: 3.4 G/DL (ref 3.4–5)
ALP SERPL-CCNC: 69 U/L (ref 40–150)
ALT SERPL W P-5'-P-CCNC: 26 U/L (ref 0–50)
ANION GAP SERPL CALCULATED.3IONS-SCNC: 4 MMOL/L (ref 3–14)
AST SERPL W P-5'-P-CCNC: 23 U/L (ref 0–45)
BASOPHILS # BLD AUTO: 0 10E9/L (ref 0–0.2)
BASOPHILS NFR BLD AUTO: 0.6 %
BILIRUB SERPL-MCNC: 0.4 MG/DL (ref 0.2–1.3)
BUN SERPL-MCNC: 8 MG/DL (ref 7–30)
CALCIUM SERPL-MCNC: 9 MG/DL (ref 8.5–10.1)
CANCER AG27-29 SERPL-ACNC: 335 U/ML (ref 0–39)
CHLORIDE SERPL-SCNC: 104 MMOL/L (ref 94–109)
CO2 SERPL-SCNC: 30 MMOL/L (ref 20–32)
CREAT SERPL-MCNC: 0.6 MG/DL (ref 0.52–1.04)
DIFFERENTIAL METHOD BLD: ABNORMAL
EOSINOPHIL # BLD AUTO: 0 10E9/L (ref 0–0.7)
EOSINOPHIL NFR BLD AUTO: 0.4 %
ERYTHROCYTE [DISTWIDTH] IN BLOOD BY AUTOMATED COUNT: 15.6 % (ref 10–15)
GFR SERPL CREATININE-BSD FRML MDRD: >90 ML/MIN/{1.73_M2}
GLUCOSE SERPL-MCNC: 105 MG/DL (ref 70–99)
HCT VFR BLD AUTO: 38 % (ref 35–47)
HGB BLD-MCNC: 12.1 G/DL (ref 11.7–15.7)
IMM GRANULOCYTES # BLD: 0 10E9/L (ref 0–0.4)
IMM GRANULOCYTES NFR BLD: 0.8 %
LYMPHOCYTES # BLD AUTO: 0.5 10E9/L (ref 0.8–5.3)
LYMPHOCYTES NFR BLD AUTO: 9.9 %
MAGNESIUM SERPL-MCNC: 1.9 MG/DL (ref 1.6–2.3)
MCH RBC QN AUTO: 28.3 PG (ref 26.5–33)
MCHC RBC AUTO-ENTMCNC: 31.8 G/DL (ref 31.5–36.5)
MCV RBC AUTO: 89 FL (ref 78–100)
MONOCYTES # BLD AUTO: 0.5 10E9/L (ref 0–1.3)
MONOCYTES NFR BLD AUTO: 9.9 %
NEUTROPHILS # BLD AUTO: 4.1 10E9/L (ref 1.6–8.3)
NEUTROPHILS NFR BLD AUTO: 78.4 %
PLATELET # BLD AUTO: 164 10E9/L (ref 150–450)
POTASSIUM SERPL-SCNC: 3.9 MMOL/L (ref 3.4–5.3)
PROT SERPL-MCNC: 6.5 G/DL (ref 6.8–8.8)
RBC # BLD AUTO: 4.28 10E12/L (ref 3.8–5.2)
SODIUM SERPL-SCNC: 138 MMOL/L (ref 133–144)
WBC # BLD AUTO: 5.3 10E9/L (ref 4–11)

## 2020-08-18 PROCEDURE — 86300 IMMUNOASSAY TUMOR CA 15-3: CPT | Performed by: NURSE PRACTITIONER

## 2020-08-18 PROCEDURE — 85025 COMPLETE CBC W/AUTO DIFF WBC: CPT | Performed by: INTERNAL MEDICINE

## 2020-08-18 PROCEDURE — 99207 ZZC NO CHARGE LOS: CPT

## 2020-08-18 PROCEDURE — 96367 TX/PROPH/DG ADDL SEQ IV INF: CPT | Performed by: NURSE PRACTITIONER

## 2020-08-18 PROCEDURE — 80053 COMPREHEN METABOLIC PANEL: CPT | Performed by: INTERNAL MEDICINE

## 2020-08-18 PROCEDURE — 83735 ASSAY OF MAGNESIUM: CPT | Performed by: INTERNAL MEDICINE

## 2020-08-18 PROCEDURE — 99207 ZZC NO CHARGE NURSE ONLY: CPT

## 2020-08-18 PROCEDURE — 96409 CHEMO IV PUSH SNGL DRUG: CPT | Performed by: NURSE PRACTITIONER

## 2020-08-18 RX ORDER — HEPARIN SODIUM (PORCINE) LOCK FLUSH IV SOLN 100 UNIT/ML 100 UNIT/ML
5 SOLUTION INTRAVENOUS
Status: DISCONTINUED | OUTPATIENT
Start: 2020-08-18 | End: 2020-08-18 | Stop reason: HOSPADM

## 2020-08-18 RX ADMIN — Medication 250 ML: at 08:59

## 2020-08-18 RX ADMIN — HEPARIN SODIUM (PORCINE) LOCK FLUSH IV SOLN 100 UNIT/ML 5 ML: 100 SOLUTION at 09:32

## 2020-08-18 RX ADMIN — HEPARIN SODIUM (PORCINE) LOCK FLUSH IV SOLN 100 UNIT/ML 5 ML: 100 SOLUTION at 08:27

## 2020-08-18 ASSESSMENT — PAIN SCALES - GENERAL: PAINLEVEL: MILD PAIN (2)

## 2020-08-18 NOTE — PROGRESS NOTES
Port needle-one inch- left accessed for treatment. Tolerated port access and draw without complaint. Port site scrubbed with Chloraprep for 30 seconds and allowed to dry completely prior to Opsite dressing application. Accessed using sterile technique. [place] tubes drawn-Red gel/Green/Purple tubes. Double signed by patient and RN. See documentation flowsheet. Mariah Paniagua, RN, BSN, OCN

## 2020-08-18 NOTE — PROGRESS NOTES
Infusion Nursing Note:  Shaneka Alvarez presents today for C10D1 Eribulin.    Patient seen by provider yesterday: Yes: TAY Valencia   present during visit today: Not Applicable.    Note: N/A.    Intravenous Access:  Implanted Port.    Treatment Conditions:  Lab Results   Component Value Date    HGB 12.1 08/18/2020     Lab Results   Component Value Date    WBC 5.3 08/18/2020      Lab Results   Component Value Date    ANEU 4.1 08/18/2020     Lab Results   Component Value Date     08/18/2020      Lab Results   Component Value Date     08/18/2020                   Lab Results   Component Value Date    POTASSIUM 3.9 08/18/2020           Lab Results   Component Value Date    MAG 1.9 08/18/2020            Lab Results   Component Value Date    CR 0.60 08/18/2020                   Lab Results   Component Value Date    RAFAELA 9.0 08/18/2020                Lab Results   Component Value Date    BILITOTAL 0.4 08/18/2020           Lab Results   Component Value Date    ALBUMIN 3.4 08/18/2020                    Lab Results   Component Value Date    ALT 26 08/18/2020           Lab Results   Component Value Date    AST 23 08/18/2020       Results reviewed, labs MET treatment parameters, ok to proceed with treatment.      Post Infusion Assessment:  Patient tolerated infusion without incident.  Site patent and intact, free from redness, edema or discomfort.  No evidence of extravasations.  Access discontinued per protocol.       Discharge Plan:   Patient will return 8/25 for next appointment.   Patient discharged in stable condition accompanied by: self.  Departure Mode: Ambulatory.    Cristina Loaiza RN

## 2020-08-25 ENCOUNTER — INFUSION THERAPY VISIT (OUTPATIENT)
Dept: INFUSION THERAPY | Facility: CLINIC | Age: 60
End: 2020-08-25
Payer: COMMERCIAL

## 2020-08-25 ENCOUNTER — ONCOLOGY VISIT (OUTPATIENT)
Dept: ONCOLOGY | Facility: CLINIC | Age: 60
End: 2020-08-25
Payer: COMMERCIAL

## 2020-08-25 VITALS
DIASTOLIC BLOOD PRESSURE: 68 MMHG | TEMPERATURE: 98.1 F | WEIGHT: 131.9 LBS | HEART RATE: 71 BPM | SYSTOLIC BLOOD PRESSURE: 100 MMHG | OXYGEN SATURATION: 100 % | RESPIRATION RATE: 18 BRPM | BODY MASS INDEX: 24.12 KG/M2

## 2020-08-25 DIAGNOSIS — E87.6 HYPOKALEMIA: Primary | ICD-10-CM

## 2020-08-25 DIAGNOSIS — C50.919 METASTATIC BREAST CANCER: ICD-10-CM

## 2020-08-25 DIAGNOSIS — C50.912 BILATERAL MALIGNANT NEOPLASM OF BREAST IN FEMALE, UNSPECIFIED ESTROGEN RECEPTOR STATUS, UNSPECIFIED SITE OF BREAST (H): ICD-10-CM

## 2020-08-25 DIAGNOSIS — C50.911 BILATERAL MALIGNANT NEOPLASM OF BREAST IN FEMALE, UNSPECIFIED ESTROGEN RECEPTOR STATUS, UNSPECIFIED SITE OF BREAST (H): ICD-10-CM

## 2020-08-25 LAB
ALBUMIN SERPL-MCNC: 3.3 G/DL (ref 3.4–5)
ALP SERPL-CCNC: 71 U/L (ref 40–150)
ALT SERPL W P-5'-P-CCNC: 29 U/L (ref 0–50)
ANION GAP SERPL CALCULATED.3IONS-SCNC: 3 MMOL/L (ref 3–14)
AST SERPL W P-5'-P-CCNC: 20 U/L (ref 0–45)
BASOPHILS # BLD AUTO: 0.1 10E9/L (ref 0–0.2)
BASOPHILS NFR BLD AUTO: 1.6 %
BILIRUB SERPL-MCNC: 0.3 MG/DL (ref 0.2–1.3)
BUN SERPL-MCNC: 11 MG/DL (ref 7–30)
CALCIUM SERPL-MCNC: 8.4 MG/DL (ref 8.5–10.1)
CHLORIDE SERPL-SCNC: 103 MMOL/L (ref 94–109)
CO2 SERPL-SCNC: 31 MMOL/L (ref 20–32)
CREAT SERPL-MCNC: 0.62 MG/DL (ref 0.52–1.04)
DIFFERENTIAL METHOD BLD: ABNORMAL
EOSINOPHIL # BLD AUTO: 0 10E9/L (ref 0–0.7)
EOSINOPHIL NFR BLD AUTO: 0.8 %
ERYTHROCYTE [DISTWIDTH] IN BLOOD BY AUTOMATED COUNT: 14.8 % (ref 10–15)
GFR SERPL CREATININE-BSD FRML MDRD: >90 ML/MIN/{1.73_M2}
GLUCOSE SERPL-MCNC: 121 MG/DL (ref 70–99)
HCT VFR BLD AUTO: 34.9 % (ref 35–47)
HGB BLD-MCNC: 11.4 G/DL (ref 11.7–15.7)
IMM GRANULOCYTES # BLD: 0.1 10E9/L (ref 0–0.4)
IMM GRANULOCYTES NFR BLD: 1.6 %
LYMPHOCYTES # BLD AUTO: 0.7 10E9/L (ref 0.8–5.3)
LYMPHOCYTES NFR BLD AUTO: 20.3 %
MAGNESIUM SERPL-MCNC: 2.1 MG/DL (ref 1.6–2.3)
MCH RBC QN AUTO: 28.9 PG (ref 26.5–33)
MCHC RBC AUTO-ENTMCNC: 32.7 G/DL (ref 31.5–36.5)
MCV RBC AUTO: 88 FL (ref 78–100)
MONOCYTES # BLD AUTO: 0.2 10E9/L (ref 0–1.3)
MONOCYTES NFR BLD AUTO: 4.7 %
NEUTROPHILS # BLD AUTO: 2.6 10E9/L (ref 1.6–8.3)
NEUTROPHILS NFR BLD AUTO: 71 %
PLATELET # BLD AUTO: 163 10E9/L (ref 150–450)
POTASSIUM SERPL-SCNC: 3.8 MMOL/L (ref 3.4–5.3)
PROT SERPL-MCNC: 6.2 G/DL (ref 6.8–8.8)
RBC # BLD AUTO: 3.95 10E12/L (ref 3.8–5.2)
SODIUM SERPL-SCNC: 137 MMOL/L (ref 133–144)
WBC # BLD AUTO: 3.7 10E9/L (ref 4–11)

## 2020-08-25 PROCEDURE — 96409 CHEMO IV PUSH SNGL DRUG: CPT | Performed by: NURSE PRACTITIONER

## 2020-08-25 PROCEDURE — 80053 COMPREHEN METABOLIC PANEL: CPT | Performed by: INTERNAL MEDICINE

## 2020-08-25 PROCEDURE — 83735 ASSAY OF MAGNESIUM: CPT | Performed by: INTERNAL MEDICINE

## 2020-08-25 PROCEDURE — 99207 ZZC NO CHARGE NURSE ONLY: CPT

## 2020-08-25 PROCEDURE — 96367 TX/PROPH/DG ADDL SEQ IV INF: CPT | Performed by: NURSE PRACTITIONER

## 2020-08-25 PROCEDURE — 85025 COMPLETE CBC W/AUTO DIFF WBC: CPT | Performed by: INTERNAL MEDICINE

## 2020-08-25 RX ORDER — HEPARIN SODIUM (PORCINE) LOCK FLUSH IV SOLN 100 UNIT/ML 100 UNIT/ML
5 SOLUTION INTRAVENOUS EVERY 8 HOURS
Status: DISCONTINUED | OUTPATIENT
Start: 2020-08-25 | End: 2020-08-25 | Stop reason: HOSPADM

## 2020-08-25 RX ORDER — HEPARIN SODIUM (PORCINE) LOCK FLUSH IV SOLN 100 UNIT/ML 100 UNIT/ML
5 SOLUTION INTRAVENOUS
Status: DISCONTINUED | OUTPATIENT
Start: 2020-08-25 | End: 2020-08-25 | Stop reason: HOSPADM

## 2020-08-25 RX ADMIN — HEPARIN SODIUM (PORCINE) LOCK FLUSH IV SOLN 100 UNIT/ML 5 ML: 100 SOLUTION at 09:39

## 2020-08-25 RX ADMIN — Medication 250 ML: at 08:48

## 2020-08-25 RX ADMIN — HEPARIN SODIUM (PORCINE) LOCK FLUSH IV SOLN 100 UNIT/ML 5 ML: 100 SOLUTION at 07:53

## 2020-08-25 ASSESSMENT — PAIN SCALES - GENERAL: PAINLEVEL: MODERATE PAIN (5)

## 2020-08-25 NOTE — PROGRESS NOTES
"SPIRITUAL HEALTH SERVICES  SPIRITUAL ASSESSMENT Progress Note  [facility] Oncology Care       REFERRAL SOURCE: Patient request for  visit.     Visited with Shaneka Alvarez  in the infusion center for ongoing emotional/spiritual support.  Patient had a book she wanted to share with me, \" Proof of Heaven\".   She was just leaving the clinic and we made agreement to talk about the book together at her next visit.   Overall patient states she is tolerating the treatments well.     PLAN:  I will plan to meet with the patient at a future infusion treatment to discuss the book.     Gina Pineda  Staff   [phone]  "

## 2020-08-25 NOTE — PROGRESS NOTES
Infusion Nursing Note:  Shaneka Alvarez presents today for C10,D8 of Eribulin..    Patient seen by provider today: No   present during visit today: Not Applicable.    Note: Pt states she is doing well, denies any problems with her treatment - will give chemotherapy as ordered.    Intravenous Access:  Implanted Port.    Treatment Conditions:  Lab Results   Component Value Date    HGB 11.4 08/25/2020     Lab Results   Component Value Date    WBC 3.7 08/25/2020      Lab Results   Component Value Date    ANEU 2.6 08/25/2020     Lab Results   Component Value Date     08/25/2020      Lab Results   Component Value Date     08/25/2020                   Lab Results   Component Value Date    POTASSIUM 3.8 08/25/2020           Lab Results   Component Value Date    MAG 2.1 08/25/2020            Lab Results   Component Value Date    CR 0.62 08/25/2020                   Lab Results   Component Value Date    RAFAELA 8.4 08/25/2020                Lab Results   Component Value Date    BILITOTAL 0.3 08/25/2020           Lab Results   Component Value Date    ALBUMIN 3.3 08/25/2020                    Lab Results   Component Value Date    ALT 29 08/25/2020           Lab Results   Component Value Date    AST 20 08/25/2020       Results reviewed, labs MET treatment parameters, ok to proceed with treatment.      Post Infusion Assessment:  Patient tolerated infusion without incident.  Blood return noted pre and post infusion.  Site patent and intact, free from redness, edema or discomfort.  No evidence of extravasations.  Access discontinued per protocol.       Discharge Plan:   Return 09/01/2020 and 09/08/2020 with Annie WRIGHT NP and infusion.  Discharge instructions reviewed with: Patient.  Patient and/or family verbalized understanding of discharge instructions and all questions answered.  Patient discharged in stable condition accompanied by: self.  Departure Mode: Ambulatory.    Loren Costa,  RN

## 2020-08-25 NOTE — PROGRESS NOTES
Port needle-one inch- left accessed for treatment. Tolerated port access and draw without complaint. Port site scrubbed with Chloraprep for 30 seconds and allowed to dry completely prior to Opsite dressing application. Accessed using sterile technique. [place] tubes drawn-Red gel/Green/Purple tubes. Double signed by patient and RN. See documentation flowsheet. Mariah Paniagua, RN, BSN, OCN

## 2020-09-08 ENCOUNTER — INFUSION THERAPY VISIT (OUTPATIENT)
Dept: INFUSION THERAPY | Facility: CLINIC | Age: 60
End: 2020-09-08
Payer: COMMERCIAL

## 2020-09-08 ENCOUNTER — APPOINTMENT (OUTPATIENT)
Dept: ONCOLOGY | Facility: CLINIC | Age: 60
End: 2020-09-08
Payer: COMMERCIAL

## 2020-09-08 ENCOUNTER — VIRTUAL VISIT (OUTPATIENT)
Dept: ONCOLOGY | Facility: CLINIC | Age: 60
End: 2020-09-08
Payer: COMMERCIAL

## 2020-09-08 VITALS — BODY MASS INDEX: 23.78 KG/M2 | WEIGHT: 130 LBS

## 2020-09-08 VITALS
RESPIRATION RATE: 16 BRPM | HEART RATE: 79 BPM | DIASTOLIC BLOOD PRESSURE: 89 MMHG | BODY MASS INDEX: 23.94 KG/M2 | TEMPERATURE: 98.4 F | OXYGEN SATURATION: 100 % | SYSTOLIC BLOOD PRESSURE: 131 MMHG | WEIGHT: 130.9 LBS

## 2020-09-08 DIAGNOSIS — C50.912 BILATERAL MALIGNANT NEOPLASM OF BREAST IN FEMALE, UNSPECIFIED ESTROGEN RECEPTOR STATUS, UNSPECIFIED SITE OF BREAST (H): ICD-10-CM

## 2020-09-08 DIAGNOSIS — F41.9 ANXIETY: ICD-10-CM

## 2020-09-08 DIAGNOSIS — C79.51 BONE METASTASES: ICD-10-CM

## 2020-09-08 DIAGNOSIS — C50.911 BILATERAL MALIGNANT NEOPLASM OF BREAST IN FEMALE, UNSPECIFIED ESTROGEN RECEPTOR STATUS, UNSPECIFIED SITE OF BREAST (H): ICD-10-CM

## 2020-09-08 DIAGNOSIS — C50.919 METASTATIC BREAST CANCER: ICD-10-CM

## 2020-09-08 DIAGNOSIS — G89.3 CANCER ASSOCIATED PAIN: Primary | ICD-10-CM

## 2020-09-08 DIAGNOSIS — E87.6 HYPOKALEMIA: Primary | ICD-10-CM

## 2020-09-08 DIAGNOSIS — G47.00 INSOMNIA, UNSPECIFIED TYPE: ICD-10-CM

## 2020-09-08 DIAGNOSIS — E87.6 HYPOKALEMIA: ICD-10-CM

## 2020-09-08 DIAGNOSIS — I26.99 ACUTE PULMONARY EMBOLISM WITHOUT ACUTE COR PULMONALE, UNSPECIFIED PULMONARY EMBOLISM TYPE (H): ICD-10-CM

## 2020-09-08 LAB
ALBUMIN SERPL-MCNC: 3.4 G/DL (ref 3.4–5)
ALP SERPL-CCNC: 90 U/L (ref 40–150)
ALT SERPL W P-5'-P-CCNC: 32 U/L (ref 0–50)
ANION GAP SERPL CALCULATED.3IONS-SCNC: 6 MMOL/L (ref 3–14)
AST SERPL W P-5'-P-CCNC: 26 U/L (ref 0–45)
BASOPHILS # BLD AUTO: 0 10E9/L (ref 0–0.2)
BASOPHILS NFR BLD AUTO: 0.5 %
BILIRUB SERPL-MCNC: 0.4 MG/DL (ref 0.2–1.3)
BUN SERPL-MCNC: 7 MG/DL (ref 7–30)
CALCIUM SERPL-MCNC: 8.9 MG/DL (ref 8.5–10.1)
CHLORIDE SERPL-SCNC: 105 MMOL/L (ref 94–109)
CO2 SERPL-SCNC: 27 MMOL/L (ref 20–32)
CREAT SERPL-MCNC: 0.55 MG/DL (ref 0.52–1.04)
DIFFERENTIAL METHOD BLD: ABNORMAL
EOSINOPHIL # BLD AUTO: 0.2 10E9/L (ref 0–0.7)
EOSINOPHIL NFR BLD AUTO: 3.2 %
ERYTHROCYTE [DISTWIDTH] IN BLOOD BY AUTOMATED COUNT: 14.6 % (ref 10–15)
GFR SERPL CREATININE-BSD FRML MDRD: >90 ML/MIN/{1.73_M2}
GLUCOSE SERPL-MCNC: 129 MG/DL (ref 70–99)
HCT VFR BLD AUTO: 38.3 % (ref 35–47)
HGB BLD-MCNC: 12.5 G/DL (ref 11.7–15.7)
IMM GRANULOCYTES # BLD: 0.1 10E9/L (ref 0–0.4)
IMM GRANULOCYTES NFR BLD: 1.1 %
LYMPHOCYTES # BLD AUTO: 0.4 10E9/L (ref 0.8–5.3)
LYMPHOCYTES NFR BLD AUTO: 6.3 %
MAGNESIUM SERPL-MCNC: 2.1 MG/DL (ref 1.6–2.3)
MCH RBC QN AUTO: 28.3 PG (ref 26.5–33)
MCHC RBC AUTO-ENTMCNC: 32.6 G/DL (ref 31.5–36.5)
MCV RBC AUTO: 87 FL (ref 78–100)
MONOCYTES # BLD AUTO: 0.5 10E9/L (ref 0–1.3)
MONOCYTES NFR BLD AUTO: 9.3 %
NEUTROPHILS # BLD AUTO: 4.5 10E9/L (ref 1.6–8.3)
NEUTROPHILS NFR BLD AUTO: 79.6 %
PLATELET # BLD AUTO: 232 10E9/L (ref 150–450)
POTASSIUM SERPL-SCNC: 3.4 MMOL/L (ref 3.4–5.3)
PROT SERPL-MCNC: 6.8 G/DL (ref 6.8–8.8)
RBC # BLD AUTO: 4.41 10E12/L (ref 3.8–5.2)
SODIUM SERPL-SCNC: 138 MMOL/L (ref 133–144)
WBC # BLD AUTO: 5.6 10E9/L (ref 4–11)

## 2020-09-08 PROCEDURE — 96409 CHEMO IV PUSH SNGL DRUG: CPT | Performed by: NURSE PRACTITIONER

## 2020-09-08 PROCEDURE — 96367 TX/PROPH/DG ADDL SEQ IV INF: CPT | Performed by: NURSE PRACTITIONER

## 2020-09-08 PROCEDURE — 99207 ZZC NO CHARGE LOS: CPT

## 2020-09-08 PROCEDURE — 80053 COMPREHEN METABOLIC PANEL: CPT | Performed by: NURSE PRACTITIONER

## 2020-09-08 PROCEDURE — 83735 ASSAY OF MAGNESIUM: CPT | Performed by: NURSE PRACTITIONER

## 2020-09-08 PROCEDURE — 85025 COMPLETE CBC W/AUTO DIFF WBC: CPT | Performed by: NURSE PRACTITIONER

## 2020-09-08 PROCEDURE — 99214 OFFICE O/P EST MOD 30 MIN: CPT | Mod: GT | Performed by: NURSE PRACTITIONER

## 2020-09-08 RX ORDER — LORAZEPAM 2 MG/ML
0.5 INJECTION INTRAMUSCULAR EVERY 4 HOURS PRN
Status: CANCELLED
Start: 2020-09-08

## 2020-09-08 RX ORDER — EPINEPHRINE 0.3 MG/.3ML
0.3 INJECTION SUBCUTANEOUS EVERY 5 MIN PRN
Status: CANCELLED | OUTPATIENT
Start: 2020-09-08

## 2020-09-08 RX ORDER — MORPHINE SULFATE 15 MG/1
15 TABLET, FILM COATED, EXTENDED RELEASE ORAL EVERY EVENING
Qty: 30 TABLET | Refills: 0 | Status: SHIPPED | OUTPATIENT
Start: 2020-09-08 | End: 2020-10-06

## 2020-09-08 RX ORDER — ALBUTEROL SULFATE 0.83 MG/ML
2.5 SOLUTION RESPIRATORY (INHALATION)
Status: CANCELLED | OUTPATIENT
Start: 2020-09-08

## 2020-09-08 RX ORDER — HEPARIN SODIUM (PORCINE) LOCK FLUSH IV SOLN 100 UNIT/ML 100 UNIT/ML
5 SOLUTION INTRAVENOUS
Status: DISCONTINUED | OUTPATIENT
Start: 2020-09-08 | End: 2020-09-08 | Stop reason: HOSPADM

## 2020-09-08 RX ORDER — MEPERIDINE HYDROCHLORIDE 25 MG/ML
25 INJECTION INTRAMUSCULAR; INTRAVENOUS; SUBCUTANEOUS EVERY 30 MIN PRN
Status: CANCELLED | OUTPATIENT
Start: 2020-09-08

## 2020-09-08 RX ORDER — DIPHENHYDRAMINE HYDROCHLORIDE 50 MG/ML
50 INJECTION INTRAMUSCULAR; INTRAVENOUS
Status: CANCELLED
Start: 2020-09-08

## 2020-09-08 RX ORDER — HEPARIN SODIUM (PORCINE) LOCK FLUSH IV SOLN 100 UNIT/ML 100 UNIT/ML
5 SOLUTION INTRAVENOUS
Status: CANCELLED | OUTPATIENT
Start: 2020-09-08

## 2020-09-08 RX ORDER — MEPERIDINE HYDROCHLORIDE 25 MG/ML
25 INJECTION INTRAMUSCULAR; INTRAVENOUS; SUBCUTANEOUS EVERY 30 MIN PRN
Status: CANCELLED | OUTPATIENT
Start: 2020-09-15

## 2020-09-08 RX ORDER — EPINEPHRINE 1 MG/ML
0.3 INJECTION, SOLUTION INTRAMUSCULAR; SUBCUTANEOUS EVERY 5 MIN PRN
Status: CANCELLED | OUTPATIENT
Start: 2020-09-08

## 2020-09-08 RX ORDER — SODIUM CHLORIDE 9 MG/ML
1000 INJECTION, SOLUTION INTRAVENOUS CONTINUOUS PRN
Status: CANCELLED
Start: 2020-09-08

## 2020-09-08 RX ORDER — ALBUTEROL SULFATE 0.83 MG/ML
2.5 SOLUTION RESPIRATORY (INHALATION)
Status: CANCELLED | OUTPATIENT
Start: 2020-09-15

## 2020-09-08 RX ORDER — METHYLPREDNISOLONE SODIUM SUCCINATE 125 MG/2ML
125 INJECTION, POWDER, LYOPHILIZED, FOR SOLUTION INTRAMUSCULAR; INTRAVENOUS
Status: CANCELLED
Start: 2020-09-08

## 2020-09-08 RX ORDER — ZOLEDRONIC ACID 0.04 MG/ML
4 INJECTION, SOLUTION INTRAVENOUS ONCE
Status: CANCELLED | OUTPATIENT
Start: 2020-09-29

## 2020-09-08 RX ORDER — EPINEPHRINE 0.3 MG/.3ML
0.3 INJECTION SUBCUTANEOUS EVERY 5 MIN PRN
Status: CANCELLED | OUTPATIENT
Start: 2020-09-15

## 2020-09-08 RX ORDER — METHYLPREDNISOLONE SODIUM SUCCINATE 125 MG/2ML
125 INJECTION, POWDER, LYOPHILIZED, FOR SOLUTION INTRAMUSCULAR; INTRAVENOUS
Status: CANCELLED
Start: 2020-09-15

## 2020-09-08 RX ORDER — NALOXONE HYDROCHLORIDE 0.4 MG/ML
.1-.4 INJECTION, SOLUTION INTRAMUSCULAR; INTRAVENOUS; SUBCUTANEOUS
Status: CANCELLED | OUTPATIENT
Start: 2020-09-08

## 2020-09-08 RX ORDER — HEPARIN SODIUM,PORCINE 10 UNIT/ML
5 VIAL (ML) INTRAVENOUS
Status: CANCELLED | OUTPATIENT
Start: 2020-09-08

## 2020-09-08 RX ORDER — DIPHENHYDRAMINE HYDROCHLORIDE 50 MG/ML
50 INJECTION INTRAMUSCULAR; INTRAVENOUS
Status: CANCELLED
Start: 2020-09-15

## 2020-09-08 RX ORDER — LORAZEPAM 2 MG/ML
0.5 INJECTION INTRAMUSCULAR EVERY 4 HOURS PRN
Status: CANCELLED
Start: 2020-09-15

## 2020-09-08 RX ORDER — SODIUM CHLORIDE 9 MG/ML
1000 INJECTION, SOLUTION INTRAVENOUS CONTINUOUS PRN
Status: CANCELLED
Start: 2020-09-15

## 2020-09-08 RX ORDER — TRAZODONE HYDROCHLORIDE 50 MG/1
50 TABLET, FILM COATED ORAL AT BEDTIME
Qty: 30 TABLET | Refills: 1 | Status: SHIPPED | OUTPATIENT
Start: 2020-09-08 | End: 2020-11-09

## 2020-09-08 RX ORDER — HEPARIN SODIUM,PORCINE 10 UNIT/ML
5 VIAL (ML) INTRAVENOUS
Status: CANCELLED | OUTPATIENT
Start: 2020-09-15

## 2020-09-08 RX ORDER — ALBUTEROL SULFATE 90 UG/1
1-2 AEROSOL, METERED RESPIRATORY (INHALATION)
Status: CANCELLED
Start: 2020-09-08

## 2020-09-08 RX ORDER — HEPARIN SODIUM (PORCINE) LOCK FLUSH IV SOLN 100 UNIT/ML 100 UNIT/ML
5 SOLUTION INTRAVENOUS
Status: CANCELLED | OUTPATIENT
Start: 2020-09-15

## 2020-09-08 RX ORDER — ALBUTEROL SULFATE 90 UG/1
1-2 AEROSOL, METERED RESPIRATORY (INHALATION)
Status: CANCELLED
Start: 2020-09-15

## 2020-09-08 RX ORDER — EPINEPHRINE 1 MG/ML
0.3 INJECTION, SOLUTION INTRAMUSCULAR; SUBCUTANEOUS EVERY 5 MIN PRN
Status: CANCELLED | OUTPATIENT
Start: 2020-09-15

## 2020-09-08 RX ORDER — NALOXONE HYDROCHLORIDE 0.4 MG/ML
.1-.4 INJECTION, SOLUTION INTRAMUSCULAR; INTRAVENOUS; SUBCUTANEOUS
Status: CANCELLED | OUTPATIENT
Start: 2020-09-15

## 2020-09-08 RX ADMIN — HEPARIN SODIUM (PORCINE) LOCK FLUSH IV SOLN 100 UNIT/ML 5 ML: 100 SOLUTION at 13:29

## 2020-09-08 RX ADMIN — Medication 250 ML: at 12:42

## 2020-09-08 ASSESSMENT — PAIN SCALES - GENERAL: PAINLEVEL: MILD PAIN (2)

## 2020-09-08 NOTE — PROGRESS NOTES
Oncology Follow Up Visit: September 8, 2020     Oncologist: Dr Dewayne Zepeda  PCP: Mohit Barcenas    Diagnosis: Metastatic Breast Cancer to brain and spine  Shaneka Alvarez is a 59 yo female who was diagnosed with Stage IIB, T2 N1 M0 invasive lobular carcinoma of the right breast  In January 2006.  Final pathology showed a 4.5 x 3.8 x 2.5 cm, 01/14 lymph nodes positive.  Estrogen, progesterone receptor positive, HER-2 negative.     Genetics- BRCA1 and 2 mutations not detected. 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  There was no evidence of new multiple sclerosis lesions.  PET scan on 8/30/2019 which showed widespread bone metastatic lesions (calvarium, spine, sacrum, pelvis, ribs most prominent at C7, T3, L1 and L4), hypermetabolic 3 cm lesion in LLL, and mediastinal/hilar LN. CEA wnl at 1.9 and C27-29 elevated to 415 (28 on 8/23/16). A CT guided biopsy of the right iliac bone was obtained on 9/4/19, pathology consistent with metastatic adenocarcinoma (breast), ER/ND negative, HER-2 negative PDL 1 < 1%. A second biopsy was take of the LLL nodule via EBUS on 9/5/19 did not show any malignancy.  C/T/L spine MRI 8/3/19 to 9/2/19 with numerous enhancing lesions of the C, T and L spine, largest around T9 and L1. No evidence of cord compression. Has a L1 compression fracture and impending L4. completed radiation therapy to T12-L5.-Neurosurgery recommended no surgical intervention but to wear [place] brace when out of bed and HOB >30 degrees  Treatment:   2006:  ECOG 2104 protocol of dose-dense Adriamycin, Cytoxan and Avastin x4 cycles followed by Taxol and Avastin x4 cycles.   03/2007:  Completed 1 year Avastin.   11/2006-01/2007:  Radiotherapy to the right breast of 5040 cGy.   [id number]:  Aromasin.  Stopped after moving to the [employer] [facility].   01/2016:  Right  modified radical mastectomy with latissimus dorsi flap reconstruction and a left prophylactic mastectomy with latissimus dorsi flap reconstruction.   9/6/2019 till 9/18/2019 Received radiation T12-L5 3000 cGy in 10 fractions   -Neurosurgery recommended no surgical intervention but to wear [place] brace when out of bed and HOB >30 degrees  9/18/2019 completed T12-L5 radiation in 10 fractions =3000 cGy  10/1/2019 Xeloda x 2 trials lowering dose to 1000 mg bid  10/23/2019 Began every 3 month Zometa  11/5/2019- 1/14/2020  weekly paclitaxel x 3 cycles  1/28/2020 Eribulin    Interval History: Ms. Alvarez is contacted today prior to cycle 11 of Eribulin. Pt states she is doing well with current plan.  She has noted some new left intra-chest pain ranked 2-3 but it is intermittent.  Patient reports its may be muscular but has not tried anything more than her morphine routine to address the area and denies any other side effects that may point to heart issues-denies dyspnea increasing fatigue or edema.  She states she is eating well having no nausea and stools are normal.  She has not noted any new neuropathies rashes trouble sleeping.  She is walking 1 mile on her treadmill daily.  She feels she is doing very well with this plan.  Rest of comprehensive and complete ROS is reviewed and is negative.   Past Medical History:   Diagnosis Date     Breast cancer (H)     Age 44     Common migraine      H/O bilateral mastectomy      Mild major depression (H)      Multiple sclerosis (H)      Current Outpatient Medications   Medication     acetaminophen (TYLENOL) 500 MG tablet     apixaban ANTICOAGULANT (ELIQUIS) 5 MG tablet     busPIRone (BUSPAR) 15 MG tablet     calcium citrate-vitamin D (CITRACAL) 315-250 MG-UNIT TABS     Cholecalciferol (VITAMIN D3 PO)     cyclopentolate (CYCLOGYL) 1 % ophthalmic solution     diclofenac (VOLTAREN) 1 % topical gel     ibuprofen (ADVIL/MOTRIN) 600 MG tablet     LORazepam (ATIVAN) 0.5 MG tablet      "magnesium 250 MG tablet     morphine (MS CONTIN) 15 MG CR tablet     morphine (MSIR) 15 MG IR tablet     naloxone (NARCAN) 1 mg/mL for intranasal kit (2 syringes with 2 mucosal atomizer device)     polyethylene glycol (MIRALAX/GLYCOLAX) packet     prochlorperazine (COMPAZINE) 10 MG tablet     propranolol (INDERAL LA) 60 MG 24 hr capsule     senna-docusate (SENOKOT-S/PERICOLACE) 8.6-50 MG tablet     syringe/needle, disp, (B-D INTEGRA SYRINGE) 23G X 1\" 3 ML MISC     traZODone (DESYREL) 50 MG tablet     venlafaxine (EFFEXOR-ER) 225 MG TB24 24 hr tablet     No current facility-administered medications for this visit.      Allergies   Allergen Reactions     Bactrim [Sulfamethoxazole W/Trimethoprim]      Internal bleeding     Copaxone [Glatiramer] Hives       Physical Exam:Wt 59 kg (130 lb)   BMI 23.78 kg/m     GENERAL: Healthy, alert and no distress  EYES: Eyes grossly normal to inspection.  No discharge or erythema, or obvious scleral/conjunctival abnormalities.  RESP: No audible wheeze, cough, or visible cyanosis.  No visible retractions or increased work of breathing.    SKIN: Visible skin clear. No significant rash, abnormal pigmentation or lesions.  Moving upper body easily and does not show pain or restrictions due to the mild chest pain.   NEURO: Cranial nerves grossly intact.  Mentation and speech appropriate for age.  PSYCH: Mentation appears normal, affect normal/bright, judgement and insight intact, normal speech and appearance well-groomed.  The rest of a comprehensive physical examination is deferred due to PHE (public health emergency) video visit restrictions     Laboratory Results:   No results found for any visits on 09/08/20.- will be completed with infusion visit.     Assessment and Plan:   Metastatic Breast Cancer to brain and spine-Pt is due to continue with eribulin cycle 11 today and after review is meeting goals however will wait for lab review with her visit.  Patient will return in 3 weeks with " review prior to infusion with Dr. Zepeda.  Chest pain-new low-grade chest pain to the anterior chest noted by patient.  After review does not appear to be related to cardiac concerns but did educate patient that she needs to share with us if this pain is changing in any way and at this point if it is muscular she could try some warmth to the area.  Patient does not feel this is related to cardiac issues after reviewing symptoms.  Back pain/bone metastasis- has palliative care oversight and is currently on MS Contin 15mg nightly with ibuprofen and MSIR available as needed and patient feels this is doing well for her with good rest.  Bone mets- Pt previously completed radiation therapy to T12-L5 1 year ago.  She continues using Zometa every 3 months which is due with next cycle.  She did confirm use of calcium plus vitamin D for support.  Also shared that her daily 1 mile walks can be helpful for her bone health.  Bilateral pulmonary embolism-  Found 7/2020 with imaging. Continues on Eliquis-we will renew for patient today and again made her aware of ongoing treatment for this concern.  Anxiety/Depression-patient now using BuSpar for help with the anxiety and has trazodone for help with depression and sleep.  MS - currently not on treatment for her MS - no new or recurrent symptoms- has not seen neurology for > 1 year.   Based upon CDC guidance and to observe social distancing in the setting of the COVID-19 pandemic, the patient was seen virtually via video visit.   Location of pt- home  Location of provider- office at clinic  Start of visit -8:37 am  End of visit- 8:49 am  Total Time 12 min visit  Annie Bailon,Maximo

## 2020-09-08 NOTE — NURSING NOTE
SYMPTOM QUESTIONNAIRE    Pain: 2-3/10, L chest using morphine    Nausea/Vomiting: no    Mouth Sores: no    Shortness of Breath: no    Smoking: no    Fever or Chills: no    Hard Stools: no    Soft Stools: no    Weight Loss: no    Weakness: no    Burning, numbness or tingling in hands or feet: no    Problems with skin or swelling: no    Memory Loss: no    Anxiety or Depression: on    Trouble Sleeping: no    Yuni Botello LPN on 9/8/2020 at 8:41 AM

## 2020-09-08 NOTE — LETTER
9/8/2020         RE: Shaneka Alvarez  [address] [facility] [address]        Dear Colleague,    Thank you for referring your patient, Shaneka Alvarez, to the [facility]. Please see a copy of my visit note below.    Oncology Follow Up Visit: September 8, 2020     Oncologist: Dr Dewayne Zepeda  PCP: Mohit Barcenas    Diagnosis: Metastatic Breast Cancer to brain and spine  Shaneka Alvarez is a 61 yo female who was diagnosed with Stage IIB, T2 N1 M0 invasive lobular carcinoma of the right breast  In January 2006.  Final pathology showed a 4.5 x 3.8 x 2.5 cm, 01/14 lymph nodes positive.  Estrogen, progesterone receptor positive, HER-2 negative.     Genetics- BRCA1 and 2 mutations not detected. 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  There was no evidence of new multiple sclerosis lesions.  PET scan on 8/30/2019 which showed widespread bone metastatic lesions (calvarium, spine, sacrum, pelvis, ribs most prominent at C7, T3, L1 and L4), hypermetabolic 3 cm lesion in LLL, and mediastinal/hilar LN. CEA wnl at 1.9 and C27-29 elevated to 415 (28 on 8/23/16). A CT guided biopsy of the right iliac bone was obtained on 9/4/19, pathology consistent with metastatic adenocarcinoma (breast), ER/MT negative, HER-2 negative PDL 1 < 1%. A second biopsy was take of the LLL nodule via EBUS on 9/5/19 did not show any malignancy.  C/T/L spine MRI 8/3/19 to 9/2/19 with numerous enhancing lesions of the C, T and L spine, largest around T9 and L1. No evidence of cord compression. Has a L1 compression fracture and impending L4. completed radiation therapy to T12-L5.-Neurosurgery recommended no surgical intervention but to wear Gorge brace when out of bed and HOB >30 degrees  Treatment:   2006:  ECOG 2104 protocol of dose-dense Adriamycin, Cytoxan and  Avastin x4 cycles followed by Taxol and Avastin x4 cycles.   03/2007:  Completed 1 year Avastin.   11/2006-01/2007:  Radiotherapy to the right breast of 5040 cGy.   03/[id number]:  Aromasin.  Stopped after moving to the [facility].   01/2016:  Right modified radical mastectomy with latissimus dorsi flap reconstruction and a left prophylactic mastectomy with latissimus dorsi flap reconstruction.   9/6/2019 till 9/18/2019 Received radiation T12-L5 3000 cGy in 10 fractions   -Neurosurgery recommended no surgical intervention but to wear [place] brace when out of bed and HOB >30 degrees  9/18/2019 completed T12-L5 radiation in 10 fractions =3000 cGy  10/1/2019 Xeloda x 2 trials lowering dose to 1000 mg bid  10/23/2019 Began every 3 month Zometa  11/5/2019- 1/14/2020  weekly paclitaxel x 3 cycles  1/28/2020 Eribulin    Interval History: Ms. Alvarez is contacted today prior to cycle 11 of Eribulin. Pt states she is doing well with current plan.  She has noted some new left intra-chest pain ranked 2-3 but it is intermittent.  Patient reports its may be muscular but has not tried anything more than her morphine routine to address the area and denies any other side effects that may point to heart issues-denies dyspnea increasing fatigue or edema.  She states she is eating well having no nausea and stools are normal.  She has not noted any new neuropathies rashes trouble sleeping.  She is walking 1 mile on her treadmill daily.  She feels she is doing very well with this plan.  Rest of comprehensive and complete ROS is reviewed and is negative.   Past Medical History:   Diagnosis Date     Breast cancer (H)     Age 44     Common migraine      H/O bilateral mastectomy      Mild major depression (H)      Multiple sclerosis (H)      Current Outpatient Medications   Medication     acetaminophen (TYLENOL) 500 MG tablet     apixaban ANTICOAGULANT (ELIQUIS) 5 MG tablet     busPIRone (BUSPAR) 15 MG tablet     calcium citrate-vitamin D  "(CITRACAL) 315-250 MG-UNIT TABS     Cholecalciferol (VITAMIN D3 PO)     cyclopentolate (CYCLOGYL) 1 % ophthalmic solution     diclofenac (VOLTAREN) 1 % topical gel     ibuprofen (ADVIL/MOTRIN) 600 MG tablet     LORazepam (ATIVAN) 0.5 MG tablet     magnesium 250 MG tablet     morphine (MS CONTIN) 15 MG CR tablet     morphine (MSIR) 15 MG IR tablet     naloxone (NARCAN) 1 mg/mL for intranasal kit (2 syringes with 2 mucosal atomizer device)     polyethylene glycol (MIRALAX/GLYCOLAX) packet     prochlorperazine (COMPAZINE) 10 MG tablet     propranolol (INDERAL LA) 60 MG 24 hr capsule     senna-docusate (SENOKOT-S/PERICOLACE) 8.6-50 MG tablet     syringe/needle, disp, (B-D INTEGRA SYRINGE) 23G X 1\" 3 ML MISC     traZODone (DESYREL) 50 MG tablet     venlafaxine (EFFEXOR-ER) 225 MG TB24 24 hr tablet     No current facility-administered medications for this visit.      Allergies   Allergen Reactions     Bactrim [Sulfamethoxazole W/Trimethoprim]      Internal bleeding     Copaxone [Glatiramer] Hives       Physical Exam:Wt 59 kg (130 lb)   BMI 23.78 kg/m     GENERAL: Healthy, alert and no distress  EYES: Eyes grossly normal to inspection.  No discharge or erythema, or obvious scleral/conjunctival abnormalities.  RESP: No audible wheeze, cough, or visible cyanosis.  No visible retractions or increased work of breathing.    SKIN: Visible skin clear. No significant rash, abnormal pigmentation or lesions.  Moving upper body easily and does not show pain or restrictions due to the mild chest pain.   NEURO: Cranial nerves grossly intact.  Mentation and speech appropriate for age.  PSYCH: Mentation appears normal, affect normal/bright, judgement and insight intact, normal speech and appearance well-groomed.  The rest of a comprehensive physical examination is deferred due to PHE (public health emergency) video visit restrictions     Laboratory Results:   No results found for any visits on 09/08/20.- will be completed with infusion " visit.     Assessment and Plan:   Metastatic Breast Cancer to brain and spine-Pt is due to continue with eribulin cycle 11 today and after review is meeting goals however will wait for lab review with her visit.  Patient will return in 3 weeks with review prior to infusion with Dr. Zepeda.  Chest pain-new low-grade chest pain to the anterior chest noted by patient.  After review does not appear to be related to cardiac concerns but did educate patient that she needs to share with us if this pain is changing in any way and at this point if it is muscular she could try some warmth to the area.  Patient does not feel this is related to cardiac issues after reviewing symptoms.  Back pain/bone metastasis- has palliative care oversight and is currently on MS Contin 15mg nightly with ibuprofen and MSIR available as needed and patient feels this is doing well for her with good rest.  Bone mets- Pt previously completed radiation therapy to T12-L5 1 year ago.  She continues using Zometa every 3 months which is due with next cycle.  She did confirm use of calcium plus vitamin D for support.  Also shared that her daily 1 mile walks can be helpful for her bone health.  Bilateral pulmonary embolism-  Found 7/2020 with imaging. Continues on Eliquis-we will renew for patient today and again made her aware of ongoing treatment for this concern.  Anxiety/Depression-patient now using BuSpar for help with the anxiety and has trazodone for help with depression and sleep.  MS - currently not on treatment for her MS - no new or recurrent symptoms- has not seen neurology for > 1 year.   Based upon CDC guidance and to observe social distancing in the setting of the COVID-19 pandemic, the patient was seen virtually via video visit.   Location of pt- home  Location of provider- office at clinic  Start of visit -8:37 am  End of visit- 8:49 am  Total Time 12 min visit  Annie Bailon,Maximo        Shaneka Alvarez is a 60 year old female who is  "being evaluated via a billable video visit.      The patient has been notified of following:     \"This video visit will be conducted via a call between you and your physician/provider. We have found that certain health care needs can be provided without the need for an in-person physical exam.  This service lets us provide the care you need with a video conversation.  If a prescription is necessary we can send it directly to your pharmacy.  If lab work is needed we can place an order for that and you can then stop by our lab to have the test done at a later time.    Video visits are billed at different rates depending on your insurance coverage.  Please reach out to your insurance provider with any questions.    If during the course of the call the physician/provider feels a video visit is not appropriate, you will not be charged for this service.\"    Patient has given verbal consent for Video visit? Yes  How would you like to obtain your AVS? MyChart    Video-Visit Details    Type of service:  Video Visit    Originating Location (pt. Location): Home    Distant Location (provider location):  [facility]     Platform used for Video Visit: Imtiaz              Again, thank you for allowing me to participate in the care of your patient.        Sincerely,        Annie Bailon NP, APRN CNP    "

## 2020-09-08 NOTE — PROGRESS NOTES
"Shaneka Alvarez is a 60 year old female who is being evaluated via a billable video visit.      The patient has been notified of following:     \"This video visit will be conducted via a call between you and your physician/provider. We have found that certain health care needs can be provided without the need for an in-person physical exam.  This service lets us provide the care you need with a video conversation.  If a prescription is necessary we can send it directly to your pharmacy.  If lab work is needed we can place an order for that and you can then stop by our lab to have the test done at a later time.    Video visits are billed at different rates depending on your insurance coverage.  Please reach out to your insurance provider with any questions.    If during the course of the call the physician/provider feels a video visit is not appropriate, you will not be charged for this service.\"    Patient has given verbal consent for Video visit? Yes  How would you like to obtain your AVS? MyChart    Video-Visit Details    Type of service:  Video Visit    Originating Location (pt. Location): Home    Distant Location (provider location):  [facility]     Platform used for Video Visit: Imtiaz            "

## 2020-09-08 NOTE — PROGRESS NOTES
"SPIRITUAL HEALTH SERVICES Progress Note  [facility]    Visited Shaneka Alvarez  in the infusion center for ongoing emotional/spiritual support.  Offered reflective conversation today about a book she shared with me called \"Proof of Heaven\".       Illness Narrative - During the infusion treatment we discussed a book that she had given to me to read.       Distress - We talked of her father's sudden death when she was 14.  We talked about a nephew who challenges her spirituality in discussions with him. How science needs proof but spirituality does not function at that same level.       Coping - Patient is not afraid to look at and discuss big issues.   Thoughts shared from the books NDE: we are loved unconditionally, there is nothing to fear, we can do no wrong.       Meaning-Making - Her spirituality is very important to her.       Plan - Patient knows that she can request a Spiritual Health encounter as needed. I will check in with her for spiritual support periodically.     Gina Anguiano  Staff       "

## 2020-09-08 NOTE — PROGRESS NOTES
Infusion Nursing Note:  Shaneka Alvarez presents today for C11D1 Eribulin.    Patient seen by provider today: Yes: Annie Bailon NP   present during visit today: Not Applicable.    Note: N/A.    Intravenous Access:  Implanted Port.    Treatment Conditions:  Lab Results   Component Value Date    HGB 12.5 09/08/2020     Lab Results   Component Value Date    WBC 5.6 09/08/2020      Lab Results   Component Value Date    ANEU 4.5 09/08/2020     Lab Results   Component Value Date     09/08/2020      Lab Results   Component Value Date     09/08/2020                   Lab Results   Component Value Date    POTASSIUM 3.4 09/08/2020           Lab Results   Component Value Date    MAG 2.1 09/08/2020            Lab Results   Component Value Date    CR 0.55 09/08/2020                   Lab Results   Component Value Date    RAFAELA 8.9 09/08/2020                Lab Results   Component Value Date    BILITOTAL 0.4 09/08/2020           Lab Results   Component Value Date    ALBUMIN 3.4 09/08/2020                    Lab Results   Component Value Date    ALT 32 09/08/2020           Lab Results   Component Value Date    AST 26 09/08/2020       Results reviewed, labs MET treatment parameters, ok to proceed with treatment.      Post Infusion Assessment:  Patient tolerated infusion without incident.  Blood return noted pre and post infusion.  Site patent and intact, free from redness, edema or discomfort.  No evidence of extravasations.  Access discontinued per protocol.       Discharge Plan:   Patient will return 9/15/2020 for next appointment.   Patient discharged in stable condition accompanied by: self.  Departure Mode: Ambulatory.    Lauren Shaw RN

## 2020-09-15 ENCOUNTER — INFUSION THERAPY VISIT (OUTPATIENT)
Dept: INFUSION THERAPY | Facility: CLINIC | Age: 60
End: 2020-09-15
Payer: COMMERCIAL

## 2020-09-15 ENCOUNTER — ONCOLOGY VISIT (OUTPATIENT)
Dept: ONCOLOGY | Facility: CLINIC | Age: 60
End: 2020-09-15
Payer: COMMERCIAL

## 2020-09-15 VITALS
DIASTOLIC BLOOD PRESSURE: 64 MMHG | HEART RATE: 67 BPM | SYSTOLIC BLOOD PRESSURE: 103 MMHG | RESPIRATION RATE: 16 BRPM | WEIGHT: 134.1 LBS | TEMPERATURE: 97.7 F | OXYGEN SATURATION: 98 % | BODY MASS INDEX: 24.53 KG/M2

## 2020-09-15 DIAGNOSIS — C50.911 BILATERAL MALIGNANT NEOPLASM OF BREAST IN FEMALE, UNSPECIFIED ESTROGEN RECEPTOR STATUS, UNSPECIFIED SITE OF BREAST (H): ICD-10-CM

## 2020-09-15 DIAGNOSIS — E87.6 HYPOKALEMIA: Primary | ICD-10-CM

## 2020-09-15 DIAGNOSIS — C50.912 BILATERAL MALIGNANT NEOPLASM OF BREAST IN FEMALE, UNSPECIFIED ESTROGEN RECEPTOR STATUS, UNSPECIFIED SITE OF BREAST (H): ICD-10-CM

## 2020-09-15 DIAGNOSIS — C50.919 METASTATIC BREAST CANCER: ICD-10-CM

## 2020-09-15 LAB
ALBUMIN SERPL-MCNC: 3.1 G/DL (ref 3.4–5)
ALP SERPL-CCNC: 91 U/L (ref 40–150)
ALT SERPL W P-5'-P-CCNC: 26 U/L (ref 0–50)
ANION GAP SERPL CALCULATED.3IONS-SCNC: 6 MMOL/L (ref 3–14)
AST SERPL W P-5'-P-CCNC: 15 U/L (ref 0–45)
BASOPHILS # BLD AUTO: 0 10E9/L (ref 0–0.2)
BASOPHILS NFR BLD AUTO: 1 %
BILIRUB SERPL-MCNC: 0.3 MG/DL (ref 0.2–1.3)
BUN SERPL-MCNC: 5 MG/DL (ref 7–30)
CALCIUM SERPL-MCNC: 8.5 MG/DL (ref 8.5–10.1)
CHLORIDE SERPL-SCNC: 102 MMOL/L (ref 94–109)
CO2 SERPL-SCNC: 30 MMOL/L (ref 20–32)
CREAT SERPL-MCNC: 0.59 MG/DL (ref 0.52–1.04)
DIFFERENTIAL METHOD BLD: ABNORMAL
EOSINOPHIL # BLD AUTO: 0 10E9/L (ref 0–0.7)
EOSINOPHIL NFR BLD AUTO: 0.5 %
ERYTHROCYTE [DISTWIDTH] IN BLOOD BY AUTOMATED COUNT: 14.6 % (ref 10–15)
GFR SERPL CREATININE-BSD FRML MDRD: >90 ML/MIN/{1.73_M2}
GLUCOSE SERPL-MCNC: 103 MG/DL (ref 70–99)
HCT VFR BLD AUTO: 33.1 % (ref 35–47)
HGB BLD-MCNC: 10.8 G/DL (ref 11.7–15.7)
IMM GRANULOCYTES # BLD: 0.1 10E9/L (ref 0–0.4)
IMM GRANULOCYTES NFR BLD: 2.7 %
LYMPHOCYTES # BLD AUTO: 0.6 10E9/L (ref 0.8–5.3)
LYMPHOCYTES NFR BLD AUTO: 15.4 %
MAGNESIUM SERPL-MCNC: 2.3 MG/DL (ref 1.6–2.3)
MCH RBC QN AUTO: 28.8 PG (ref 26.5–33)
MCHC RBC AUTO-ENTMCNC: 32.6 G/DL (ref 31.5–36.5)
MCV RBC AUTO: 88 FL (ref 78–100)
MONOCYTES # BLD AUTO: 0.3 10E9/L (ref 0–1.3)
MONOCYTES NFR BLD AUTO: 6.5 %
NEUTROPHILS # BLD AUTO: 3.1 10E9/L (ref 1.6–8.3)
NEUTROPHILS NFR BLD AUTO: 73.9 %
PLATELET # BLD AUTO: 192 10E9/L (ref 150–450)
POTASSIUM SERPL-SCNC: 3.7 MMOL/L (ref 3.4–5.3)
PROT SERPL-MCNC: 6.2 G/DL (ref 6.8–8.8)
RBC # BLD AUTO: 3.75 10E12/L (ref 3.8–5.2)
SODIUM SERPL-SCNC: 138 MMOL/L (ref 133–144)
WBC # BLD AUTO: 4.2 10E9/L (ref 4–11)

## 2020-09-15 PROCEDURE — 99207 ZZC NO CHARGE LOS: CPT

## 2020-09-15 PROCEDURE — 83735 ASSAY OF MAGNESIUM: CPT | Performed by: INTERNAL MEDICINE

## 2020-09-15 PROCEDURE — 96367 TX/PROPH/DG ADDL SEQ IV INF: CPT | Performed by: NURSE PRACTITIONER

## 2020-09-15 PROCEDURE — 96409 CHEMO IV PUSH SNGL DRUG: CPT | Performed by: NURSE PRACTITIONER

## 2020-09-15 PROCEDURE — 99207 ZZC NO CHARGE NURSE ONLY: CPT

## 2020-09-15 PROCEDURE — 85025 COMPLETE CBC W/AUTO DIFF WBC: CPT | Performed by: INTERNAL MEDICINE

## 2020-09-15 PROCEDURE — 80053 COMPREHEN METABOLIC PANEL: CPT | Performed by: INTERNAL MEDICINE

## 2020-09-15 RX ORDER — HEPARIN SODIUM (PORCINE) LOCK FLUSH IV SOLN 100 UNIT/ML 100 UNIT/ML
5 SOLUTION INTRAVENOUS
Status: DISCONTINUED | OUTPATIENT
Start: 2020-09-15 | End: 2020-09-15 | Stop reason: HOSPADM

## 2020-09-15 RX ADMIN — HEPARIN SODIUM (PORCINE) LOCK FLUSH IV SOLN 100 UNIT/ML 5 ML: 100 SOLUTION at 09:50

## 2020-09-15 RX ADMIN — HEPARIN SODIUM (PORCINE) LOCK FLUSH IV SOLN 100 UNIT/ML 5 ML: 100 SOLUTION at 11:39

## 2020-09-15 RX ADMIN — Medication 250 ML: at 10:59

## 2020-09-15 ASSESSMENT — PAIN SCALES - GENERAL: PAINLEVEL: MILD PAIN (2)

## 2020-09-15 NOTE — PROGRESS NOTES
Infusion Nursing Note:  Shaneka Alvarez presents today for C11D8 Eribulin.    Patient seen by provider today: No   present during visit today: Not Applicable.    Note: N/A.    Intravenous Access:  Implanted Port.    Treatment Conditions:  Lab Results   Component Value Date    HGB 10.8 09/15/2020     Lab Results   Component Value Date    WBC 4.2 09/15/2020      Lab Results   Component Value Date    ANEU 3.1 09/15/2020     Lab Results   Component Value Date     09/15/2020      Lab Results   Component Value Date     09/15/2020                   Lab Results   Component Value Date    POTASSIUM 3.7 09/15/2020           Lab Results   Component Value Date    MAG 2.3 09/15/2020            Lab Results   Component Value Date    CR 0.59 09/15/2020                   Lab Results   Component Value Date    RAFAELA 8.5 09/15/2020                Lab Results   Component Value Date    BILITOTAL 0.3 09/15/2020           Lab Results   Component Value Date    ALBUMIN 3.1 09/15/2020                    Lab Results   Component Value Date    ALT 26 09/15/2020           Lab Results   Component Value Date    AST 15 09/15/2020       Results reviewed, labs MET treatment parameters, ok to proceed with treatment.      Post Infusion Assessment:  Patient tolerated infusion without incident.  Blood return noted pre and post infusion.  Site patent and intact, free from redness, edema or discomfort.  No evidence of extravasations.  Access discontinued per protocol.       Discharge Plan:   Patient will return 9/29/2020 for next appointment.   Patient discharged in stable condition accompanied by: self.  Departure Mode: Ambulatory.    Lauren Shaw RN

## 2020-09-15 NOTE — PROGRESS NOTES
Port needle-one inch- left accessed for treatment. Tolerated port access and draw without complaint. Port site scrubbed with Chloraprep for 30 seconds and allowed to dry completely prior to Opsite dressing application. Accessed using sterile technique. [place] tubes drawn-Red gel/Green/Purple tubes. Double signed by patient and RN. See documentation flowsheet. Mariah Paniagua, RN, BSN, OCN

## 2020-09-22 ENCOUNTER — PATIENT OUTREACH (OUTPATIENT)
Dept: ONCOLOGY | Facility: CLINIC | Age: 60
End: 2020-09-22

## 2020-09-22 NOTE — PROGRESS NOTES
Completed FMLA form for [employer] and faxed to [employer] @ [phone]. FAX confirmed as received.

## 2020-09-24 ENCOUNTER — HOSPITAL ENCOUNTER (EMERGENCY)
Facility: CLINIC | Age: 60
Discharge: HOME | End: 2020-09-24
Attending: EMERGENCY MEDICINE | Admitting: EMERGENCY MEDICINE
Payer: COMMERCIAL

## 2020-09-24 VITALS
RESPIRATION RATE: 20 BRPM | HEART RATE: 103 BPM | BODY MASS INDEX: 22.9 KG/M2 | DIASTOLIC BLOOD PRESSURE: 98 MMHG | SYSTOLIC BLOOD PRESSURE: 140 MMHG | TEMPERATURE: 98.5 F | OXYGEN SATURATION: 100 % | WEIGHT: 125.2 LBS

## 2020-09-24 DIAGNOSIS — E87.6 LOW SERUM POTASSIUM: ICD-10-CM

## 2020-09-24 DIAGNOSIS — C50.919 METASTATIC BREAST CANCER: ICD-10-CM

## 2020-09-24 DIAGNOSIS — R11.2 NON-INTRACTABLE VOMITING WITH NAUSEA, UNSPECIFIED VOMITING TYPE: ICD-10-CM

## 2020-09-24 LAB
ALBUMIN SERPL-MCNC: 4.1 G/DL (ref 3.4–5)
ALP SERPL-CCNC: 110 U/L (ref 40–150)
ALT SERPL W P-5'-P-CCNC: 31 U/L (ref 0–50)
ANION GAP SERPL CALCULATED.3IONS-SCNC: 12 MMOL/L (ref 3–14)
AST SERPL W P-5'-P-CCNC: 25 U/L (ref 0–45)
BASOPHILS # BLD AUTO: 0 10E9/L (ref 0–0.2)
BASOPHILS NFR BLD AUTO: 0.2 %
BILIRUB SERPL-MCNC: 0.8 MG/DL (ref 0.2–1.3)
BUN SERPL-MCNC: 12 MG/DL (ref 7–30)
CALCIUM SERPL-MCNC: 9.9 MG/DL (ref 8.5–10.1)
CHLORIDE SERPL-SCNC: 99 MMOL/L (ref 94–109)
CO2 SERPL-SCNC: 24 MMOL/L (ref 20–32)
CREAT SERPL-MCNC: 0.62 MG/DL (ref 0.52–1.04)
DIFFERENTIAL METHOD BLD: ABNORMAL
EOSINOPHIL NFR BLD AUTO: 0 %
ERYTHROCYTE [DISTWIDTH] IN BLOOD BY AUTOMATED COUNT: 15 % (ref 10–15)
GFR SERPL CREATININE-BSD FRML MDRD: >90 ML/MIN/{1.73_M2}
GLUCOSE SERPL-MCNC: 122 MG/DL (ref 70–99)
HCT VFR BLD AUTO: 38.6 % (ref 35–47)
HGB BLD-MCNC: 13.4 G/DL (ref 11.7–15.7)
IMM GRANULOCYTES # BLD: 0.1 10E9/L (ref 0–0.4)
IMM GRANULOCYTES NFR BLD: 1.2 %
INR PPP: 1.05 (ref 0.86–1.14)
LACTATE BLD-SCNC: 2.6 MMOL/L (ref 0.7–2)
LIPASE SERPL-CCNC: 46 U/L (ref 73–393)
LYMPHOCYTES # BLD AUTO: 0.5 10E9/L (ref 0.8–5.3)
LYMPHOCYTES NFR BLD AUTO: 8.4 %
MAGNESIUM SERPL-MCNC: 1.8 MG/DL (ref 1.6–2.3)
MCH RBC QN AUTO: 29 PG (ref 26.5–33)
MCHC RBC AUTO-ENTMCNC: 34.7 G/DL (ref 31.5–36.5)
MCV RBC AUTO: 84 FL (ref 78–100)
MONOCYTES # BLD AUTO: 0.8 10E9/L (ref 0–1.3)
MONOCYTES NFR BLD AUTO: 12.6 %
NEUTROPHILS # BLD AUTO: 5 10E9/L (ref 1.6–8.3)
NEUTROPHILS NFR BLD AUTO: 77.6 %
NRBC # BLD AUTO: 0 10*3/UL
NRBC BLD AUTO-RTO: 0 /100
PLATELET # BLD AUTO: 291 10E9/L (ref 150–450)
POTASSIUM SERPL-SCNC: 2.8 MMOL/L (ref 3.4–5.3)
PROT SERPL-MCNC: 7.8 G/DL (ref 6.8–8.8)
RBC # BLD AUTO: 4.62 10E12/L (ref 3.8–5.2)
SODIUM SERPL-SCNC: 135 MMOL/L (ref 133–144)
WBC # BLD AUTO: 6.4 10E9/L (ref 4–11)

## 2020-09-24 PROCEDURE — 96376 TX/PRO/DX INJ SAME DRUG ADON: CPT | Performed by: EMERGENCY MEDICINE

## 2020-09-24 PROCEDURE — 85610 PROTHROMBIN TIME: CPT | Performed by: EMERGENCY MEDICINE

## 2020-09-24 PROCEDURE — 99285 EMERGENCY DEPT VISIT HI MDM: CPT | Mod: Z6 | Performed by: EMERGENCY MEDICINE

## 2020-09-24 PROCEDURE — 99285 EMERGENCY DEPT VISIT HI MDM: CPT | Mod: 25 | Performed by: EMERGENCY MEDICINE

## 2020-09-24 PROCEDURE — 25000128 H RX IP 250 OP 636: Performed by: EMERGENCY MEDICINE

## 2020-09-24 PROCEDURE — 96361 HYDRATE IV INFUSION ADD-ON: CPT | Performed by: EMERGENCY MEDICINE

## 2020-09-24 PROCEDURE — 83735 ASSAY OF MAGNESIUM: CPT | Performed by: EMERGENCY MEDICINE

## 2020-09-24 PROCEDURE — 80053 COMPREHEN METABOLIC PANEL: CPT | Performed by: EMERGENCY MEDICINE

## 2020-09-24 PROCEDURE — 96365 THER/PROPH/DIAG IV INF INIT: CPT | Performed by: EMERGENCY MEDICINE

## 2020-09-24 PROCEDURE — 85025 COMPLETE CBC W/AUTO DIFF WBC: CPT | Performed by: EMERGENCY MEDICINE

## 2020-09-24 PROCEDURE — 83690 ASSAY OF LIPASE: CPT | Performed by: EMERGENCY MEDICINE

## 2020-09-24 PROCEDURE — 25800030 ZZH RX IP 258 OP 636: Performed by: EMERGENCY MEDICINE

## 2020-09-24 PROCEDURE — 83605 ASSAY OF LACTIC ACID: CPT | Performed by: EMERGENCY MEDICINE

## 2020-09-24 PROCEDURE — 96366 THER/PROPH/DIAG IV INF ADDON: CPT | Performed by: EMERGENCY MEDICINE

## 2020-09-24 PROCEDURE — 25000132 ZZH RX MED GY IP 250 OP 250 PS 637: Performed by: EMERGENCY MEDICINE

## 2020-09-24 PROCEDURE — 96375 TX/PRO/DX INJ NEW DRUG ADDON: CPT | Performed by: EMERGENCY MEDICINE

## 2020-09-24 RX ORDER — HYDROMORPHONE HYDROCHLORIDE 1 MG/ML
0.5 INJECTION, SOLUTION INTRAMUSCULAR; INTRAVENOUS; SUBCUTANEOUS
Status: COMPLETED | OUTPATIENT
Start: 2020-09-24 | End: 2020-09-24

## 2020-09-24 RX ORDER — LIDOCAINE 40 MG/G
CREAM TOPICAL
Status: DISCONTINUED | OUTPATIENT
Start: 2020-09-24 | End: 2020-09-24 | Stop reason: HOSPADM

## 2020-09-24 RX ORDER — POTASSIUM CL/LIDO/0.9 % NACL 10MEQ/0.1L
10 INTRAVENOUS SOLUTION, PIGGYBACK (ML) INTRAVENOUS
Status: DISCONTINUED | OUTPATIENT
Start: 2020-09-24 | End: 2020-09-24 | Stop reason: HOSPADM

## 2020-09-24 RX ORDER — HEPARIN SODIUM,PORCINE 10 UNIT/ML
5-10 VIAL (ML) INTRAVENOUS EVERY 24 HOURS
Status: DISCONTINUED | OUTPATIENT
Start: 2020-09-24 | End: 2020-09-24 | Stop reason: HOSPADM

## 2020-09-24 RX ORDER — ONDANSETRON 2 MG/ML
4 INJECTION INTRAMUSCULAR; INTRAVENOUS ONCE
Status: COMPLETED | OUTPATIENT
Start: 2020-09-24 | End: 2020-09-24

## 2020-09-24 RX ORDER — POTASSIUM CHLORIDE 1500 MG/1
20 TABLET, EXTENDED RELEASE ORAL DAILY
Qty: 3 TABLET | Refills: 0 | Status: SHIPPED | OUTPATIENT
Start: 2020-09-24 | End: 2020-09-27

## 2020-09-24 RX ORDER — IOPAMIDOL 755 MG/ML
500 INJECTION, SOLUTION INTRAVASCULAR ONCE
Status: DISCONTINUED | OUTPATIENT
Start: 2020-09-24 | End: 2020-09-24 | Stop reason: HOSPADM

## 2020-09-24 RX ORDER — HEPARIN SODIUM,PORCINE 10 UNIT/ML
5-10 VIAL (ML) INTRAVENOUS
Status: DISCONTINUED | OUTPATIENT
Start: 2020-09-24 | End: 2020-09-24 | Stop reason: HOSPADM

## 2020-09-24 RX ORDER — HEPARIN SODIUM (PORCINE) LOCK FLUSH IV SOLN 100 UNIT/ML 100 UNIT/ML
5 SOLUTION INTRAVENOUS
Status: DISCONTINUED | OUTPATIENT
Start: 2020-09-24 | End: 2020-09-24 | Stop reason: HOSPADM

## 2020-09-24 RX ORDER — POTASSIUM CHLORIDE 1500 MG/1
20 TABLET, EXTENDED RELEASE ORAL ONCE
Status: COMPLETED | OUTPATIENT
Start: 2020-09-24 | End: 2020-09-24

## 2020-09-24 RX ORDER — SODIUM CHLORIDE 9 MG/ML
INJECTION, SOLUTION INTRAVENOUS CONTINUOUS
Status: DISCONTINUED | OUTPATIENT
Start: 2020-09-24 | End: 2020-09-24 | Stop reason: HOSPADM

## 2020-09-24 RX ORDER — ONDANSETRON 4 MG/1
4 TABLET, ORALLY DISINTEGRATING ORAL EVERY 8 HOURS PRN
Qty: 3 TABLET | Refills: 0 | Status: SHIPPED | OUTPATIENT
Start: 2020-09-24 | End: 2020-09-27

## 2020-09-24 RX ADMIN — ONDANSETRON 4 MG: 2 INJECTION INTRAMUSCULAR; INTRAVENOUS at 16:12

## 2020-09-24 RX ADMIN — POTASSIUM CHLORIDE 20 MEQ: 1500 TABLET, EXTENDED RELEASE ORAL at 17:18

## 2020-09-24 RX ADMIN — SODIUM CHLORIDE 1000 ML: 9 INJECTION, SOLUTION INTRAVENOUS at 16:11

## 2020-09-24 RX ADMIN — HEPARIN, PORCINE (PF) 10 UNIT/ML INTRAVENOUS SYRINGE 5 ML: at 19:03

## 2020-09-24 RX ADMIN — HYDROMORPHONE HYDROCHLORIDE 0.5 MG: 1 INJECTION, SOLUTION INTRAMUSCULAR; INTRAVENOUS; SUBCUTANEOUS at 16:18

## 2020-09-24 RX ADMIN — SODIUM CHLORIDE: 9 INJECTION, SOLUTION INTRAVENOUS at 17:18

## 2020-09-24 RX ADMIN — Medication 10 MEQ: at 17:19

## 2020-09-24 RX ADMIN — HYDROMORPHONE HYDROCHLORIDE 0.5 MG: 1 INJECTION, SOLUTION INTRAMUSCULAR; INTRAVENOUS; SUBCUTANEOUS at 17:13

## 2020-09-24 RX ADMIN — HYDROMORPHONE HYDROCHLORIDE 0.5 MG: 1 INJECTION, SOLUTION INTRAMUSCULAR; INTRAVENOUS; SUBCUTANEOUS at 16:38

## 2020-09-24 NOTE — ED TRIAGE NOTES
Has been having nausea, vomiting and diarrhea since yesterday afternoon. Hx breast CA with mets to the bone, last treatment last about 1 1/2 weeks ago.  Patient's airway, breathing, circulation, and disability/mental status (ABCDs) intact/WDL during triage..

## 2020-09-24 NOTE — ED AVS SNAPSHOT
[facility] Emergency Department  [address] [place] [address]  Phone:  [phone]  Fax:  [phone]                                    Shaneka Alvarez   MRN: [id number]    Department:  [facility] Emergency Department   Date of Visit:  9/24/2020           After Visit Summary Signature Page    I have received my discharge instructions, and my questions have been answered. I have discussed any challenges I see with this plan with the nurse or doctor.    ..........................................................................................................................................  Patient/Patient Representative Signature      ..........................................................................................................................................  Patient Representative Print Name and Relationship to Patient    ..................................................               ................................................  Date                                   Time    ..........................................................................................................................................  Reviewed by Signature/Title    ...................................................              ..............................................  Date                                               Time          22EPIC Rev 08/18

## 2020-09-24 NOTE — ED PROVIDER NOTES
History     Chief Complaint   Patient presents with     Nausea, Vomiting, & Diarrhea     HPI  Shaneka Alvarez is a 60 year old female who presents with 2 days of nausea, vomiting, and diarrhea.  The vomiting and diarrhea have been nonbloody.  The diarrhea has pretty much resolved but she persists with nausea and vomiting.  No significant abdominal pain.  Denies fever but felt chilled.  She has had no headache, runny nose or sore throat.  Denies chest pain or shortness of breath.  Has not had a cough.  She does have known breast cancer with metastases to the bone.  She also has had PEs and is on blood thinners for this.  Denies urinary symptoms.  Denies exposure to infectious illness.  No recent travel.  She has MS which is stable.  She has had a cholecystectomy and bilateral oophorectomy.  She has had previous endoscopy and colonoscopy and states those were normal.  She is a former smoker.  Most recent blood testing on 8/20 her CA 27/29 was elevated to 335.    Allergies:  Allergies   Allergen Reactions     Bactrim [Sulfamethoxazole W/Trimethoprim]      Internal bleeding     Copaxone [Glatiramer] Hives       Problem List:    Patient Active Problem List    Diagnosis Date Noted     Pulmonary embolism without acute cor pulmonale, unspecified chronicity, unspecified pulmonary embolism type (H) 08/17/2020     Priority: Medium     Hypokalemia 07/28/2020     Priority: Medium     Bone metastases (H) 10/22/2019     Priority: Medium     Metastatic breast cancer (H) 09/18/2019     Priority: Medium     Metastatic disease (H) 08/31/2019     Priority: Medium     S/P breast reconstruction, bilateral 07/31/2018     Priority: Medium     Anxiety and depression 10/25/2017     Priority: Medium     Hx of breast cancer 10/25/2017     Priority: Medium     Major depressive disorder, recurrent episode, moderate (H) 04/07/2016     Priority: Medium     Anxiety 03/12/2016     Priority: Medium     Migraine without aura and without status  migrainosus, not intractable 10/23/2015     Priority: Medium     Obesity, Class II, BMI 35-39.9 10/23/2015     Priority: Medium     Multiple sclerosis (H) 08/11/2015     Priority: Medium     CARDIOVASCULAR SCREENING; LDL GOAL LESS THAN 160 08/11/2015     Priority: Medium     Raynaud's syndrome 08/11/2015     Priority: Medium     Breast cancer (H) 08/11/2015     Priority: Medium     Age 44       Complication of anesthesia 08/11/2015     Priority: Medium     Arthritis 08/11/2015     Priority: Medium     Autoimmune disorder (H) 08/11/2015     Priority: Medium     Other insomnia 08/11/2015     Priority: Medium     Medication management contract agreement 08/11/2015     Priority: Medium     Ambien 12.5 mg, dispense 30 per month, follow up every 6 months        Neurogenic bladder 12/22/2014     Priority: Medium     Vision changes 12/22/2014     Priority: Medium     Demyelinating disorder (H) 12/22/2014     Priority: Medium     Weakness 11/20/2014     Priority: Medium     GERD (gastroesophageal reflux disease) 10/22/2014     Priority: Medium     History of breast cancer 10/22/2014     Priority: Medium     Overview:   stage 3, diagnosed in 2006 s/p double mastectomy, chemo and radiation.        Migraine 10/22/2014     Priority: Medium        Past Medical History:    Past Medical History:   Diagnosis Date     Breast cancer (H)      Common migraine      H/O bilateral mastectomy      Mild major depression (H)      Multiple sclerosis (H)        Past Surgical History:    Past Surgical History:   Procedure Laterality Date     ENDOBRONCHIAL ULTRASOUND FLEXIBLE N/A 9/5/2019    Procedure: Flexible Bronchoscopy, Endobronchial Ultrasound, Radial Probe;  Surgeon: Sree Sanchez MD;  Location: [facility] OR      REMOVAL OF OVARY/TUBE(S)       HERNIA REPAIR, UMBILICAL       mastectomy  Bilateral 2006     MASTECTOMY, BILATERAL         Family History:    Family History   Problem Relation Age of Onset     Breast Cancer Maternal Aunt 50     "     at 85     Breast Cancer Cousin 40     Breast Cancer Mother 49        2nd primary, contralateral breast at 69;  at 86     Melanoma Mother      Breast Cancer Maternal Grandmother 40     Ovarian Cancer Maternal Grandmother      Breast Cancer Paternal Grandmother 50         at 60     Brain Cancer Cousin 20     Breast Cancer Paternal Aunt 50         at 60     Breast Cancer Cousin 45        paternal cousin       Social History:  Marital Status:   [2]  Social History     Tobacco Use     Smoking status: Former Smoker     Types: Cigarettes     Last attempt to quit: 2005     Years since quittin.8     Smokeless tobacco: Never Used   Substance Use Topics     Alcohol use: Yes     Alcohol/week: 2.0 standard drinks     Types: 1 Glasses of wine, 1 Cans of beer per week     Comment: TWICE A WEEK     Drug use: No        Medications:    ondansetron (ZOFRAN ODT) 4 MG ODT tab  potassium chloride ER (KLOR-CON M) 20 MEQ CR tablet  acetaminophen (TYLENOL) 500 MG tablet  apixaban ANTICOAGULANT (ELIQUIS) 5 MG tablet  busPIRone (BUSPAR) 15 MG tablet  calcium citrate-vitamin D (CITRACAL) 315-250 MG-UNIT TABS  Cholecalciferol (VITAMIN D3 PO)  cyclopentolate (CYCLOGYL) 1 % ophthalmic solution  diclofenac (VOLTAREN) 1 % topical gel  ibuprofen (ADVIL/MOTRIN) 600 MG tablet  LORazepam (ATIVAN) 0.5 MG tablet  magnesium 250 MG tablet  morphine (MS CONTIN) 15 MG CR tablet  morphine (MSIR) 15 MG IR tablet  naloxone (NARCAN) 1 mg/mL for intranasal kit (2 syringes with 2 mucosal atomizer device)  polyethylene glycol (MIRALAX/GLYCOLAX) packet  prochlorperazine (COMPAZINE) 10 MG tablet  propranolol (INDERAL LA) 60 MG 24 hr capsule  senna-docusate (SENOKOT-S/PERICOLACE) 8.6-50 MG tablet  syringe/needle, disp, (B-D INTE[employer]RA SYRINGE) 23G X 1\" 3 ML MISC  traZODone (DESYREL) 50 MG tablet  venlafaxine (EFFEXOR-ER) 225 MG TB24 24 hr tablet          Review of Systems all other systems are reviewed and are negative.    Physical " Exam   BP: (!) 147/106  Pulse: 115  Temp: 98.5  F (36.9  C)  Resp: 20  Weight: 56.8 kg (125 lb 3.2 oz)  SpO2: 100 %      Physical Exam general alert cooperative female in mild to moderate stress.  HEENT shows no scleral icterus.  She is able to speak complete sentences.  Mucosa looks moist.  Neck is supple.  Lungs reveal basilar crackles but no other adventitious lung sounds.  Cardiac auscultation was normal without murmur.  She is tachycardic.  Back reveals no CVA tenderness.  Abdomen feels active bowel sounds she has mild epigastric and left upper quadrant tenderness.  No guarding or rebound.  No leg pain or swelling.  No skin rashes are appreciated.    ED Course        Procedures               Critical Care time:  none               Results for orders placed or performed during the hospital encounter of 09/24/20 (from the past 24 hour(s))   CBC with platelets differential   Result Value Ref Range    WBC 6.4 4.0 - 11.0 10e9/L    RBC Count 4.62 3.8 - 5.2 10e12/L    Hemoglobin 13.4 11.7 - 15.7 g/dL    Hematocrit 38.6 35.0 - 47.0 %    MCV 84 78 - 100 fl    MCH 29.0 26.5 - 33.0 pg    MCHC 34.7 31.5 - 36.5 g/dL    RDW 15.0 10.0 - 15.0 %    Platelet Count 291 150 - 450 10e9/L    Diff Method Automated Method     % Neutrophils 77.6 %    % Lymphocytes 8.4 %    % Monocytes 12.6 %    % Eosinophils 0.0 %    % Basophils 0.2 %    % Immature Granulocytes 1.2 %    Nucleated RBCs 0 0 /100    Absolute Neutrophil 5.0 1.6 - 8.3 10e9/L    Absolute Lymphocytes 0.5 (L) 0.8 - 5.3 10e9/L    Absolute Monocytes 0.8 0.0 - 1.3 10e9/L    Absolute Basophils 0.0 0.0 - 0.2 10e9/L    Abs Immature Granulocytes 0.1 0 - 0.4 10e9/L    Absolute Nucleated RBC 0.0    INR   Result Value Ref Range    INR 1.05 0.86 - 1.14   Comprehensive metabolic panel   Result Value Ref Range    Sodium 135 133 - 144 mmol/L    Potassium 2.8 (L) 3.4 - 5.3 mmol/L    Chloride 99 94 - 109 mmol/L    Carbon Dioxide 24 20 - 32 mmol/L    Anion Gap 12 3 - 14 mmol/L    Glucose 122  (H) 70 - 99 mg/dL    Urea Nitrogen 12 7 - 30 mg/dL    Creatinine 0.62 0.52 - 1.04 mg/dL    GFR Estimate >90 >60 mL/min/[1.73_m2]    GFR Estimate If Black >90 >60 mL/min/[1.73_m2]    Calcium 9.9 8.5 - 10.1 mg/dL    Bilirubin Total 0.8 0.2 - 1.3 mg/dL    Albumin 4.1 3.4 - 5.0 g/dL    Protein Total 7.8 6.8 - 8.8 g/dL    Alkaline Phosphatase 110 40 - 150 U/L    ALT 31 0 - 50 U/L    AST 25 0 - 45 U/L   Lipase   Result Value Ref Range    Lipase 46 (L) 73 - 393 U/L   Lactic acid whole blood   Result Value Ref Range    Lactic Acid 2.6 (H) 0.7 - 2.0 mmol/L   Magnesium   Result Value Ref Range    Magnesium 1.8 1.6 - 2.3 mg/dL       Medications   0.9% sodium chloride BOLUS (0 mLs Intravenous Stopped 9/24/20 1717)     Followed by   sodium chloride 0.9% infusion ( Intravenous New Bag 9/24/20 1718)   potassium chloride 10 mEq in 100 mL intermittent infusion with 10 mg lidocaine (10 mEq Intravenous New Bag 9/24/20 1719)   Saline 100mL Bag (has no administration in time range)   iopamidol (ISOVUE-370) solution 500 mL (has no administration in time range)   sodium chloride (PF) 0.9% PF flush 3 mL (has no administration in time range)   ondansetron (ZOFRAN) injection 4 mg (4 mg Intravenous Given 9/24/20 1612)   HYDROmorphone (PF) (DILAUDID) injection 0.5 mg (0.5 mg Intravenous Given 9/24/20 1713)   potassium chloride ER (KLOR-CON M) CR tablet 20 mEq (20 mEq Oral Given 9/24/20 1718)     IV was established and fluid bolus was provided.  Patient was given Zofran for nausea.  Blood work and urine are ordered.  On recheck patient's nausea was improved but she requested something for her pain.  Dilaudid is ordered.  Awaiting blood results for imaging.  Her lactic was mild elevated 2.6.  Doubt sepsis but patient is given fluids nonetheless.  On recheck at 5:40 PM patient is improved.  History of metastatic cancer we will CT her abdomen to look for causes for her persistent vomiting.  However patient is a hard stick and has a PowerPort so  did not want to proceed with the CT scan.  She states she has one coming in October.  She feels near to baseline and does not feel it is necessary.  Assessments & Plan (with Medical Decision Making)   Shaneka Alvarez is a 60 year old female who presents with 2 days of nausea, vomiting, and diarrhea.  The vomiting and diarrhea have been nonbloody.  The diarrhea has pretty much resolved but she persists with nausea and vomiting.  No significant abdominal pain.  Denies fever but felt chilled.  She has had no headache, runny nose or sore throat.  Denies chest pain or shortness of breath.  Has not had a cough.  She does have known breast cancer with metastases to the bone.  She also has had PEs and is on blood thinners for this.  Denies urinary symptoms.  Denies exposure to infectious illness.  No recent travel.  She has MS which is stable.  She has had a cholecystectomy and bilateral oophorectomy.  She has had previous endoscopy and colonoscopy and states those were normal.  She is a former smoker.  On presentation patient was in moderate distress.  She was tachycardic but not hypotensive.  She is not hypoxic.  Did not have a fever.  She had basilar crackles on lung exam but no other adventitious sounds.  Cardiac auscultation other than tachycardia was normal.  Abdominal exam she had diffuse upper abdominal tenderness without guarding or rebound.  No CVA tenderness.  No leg pain or swelling.  Patient was given fluids and Zofran for nausea.  Her blood work was unremarkable set low potassium.  This was supplemented both orally and IV here in the department.  We will give her additional potassium for home over the next 3 days.  Diet rich in potassium is provided.  Zofran for recurrent vomiting.  Maintain hydration.  If symptoms persist or she has new worsening symptoms she can return for reassessment and a CT can be considered at that time.  I have reviewed the nursing notes.    I have reviewed the findings, diagnosis,  plan and need for follow up with the patient.       New Prescriptions    ONDANSETRON (ZOFRAN ODT) 4 MG ODT TAB    Take 1 tablet (4 mg) by mouth every 8 hours as needed    POTASSIUM CHLORIDE ER (KLOR-CON M) 20 MEQ CR TABLET    Take 1 tablet (20 mEq) by mouth daily for 3 days       Final diagnoses:   Metastatic breast cancer (H)   Non-intractable vomiting with nausea, unspecified vomiting type   Low serum potassium       9/24/2020   [facility] EMERGENCY DEPARTMENT     Felipe Bond MD  09/24/20 2062

## 2020-09-29 ENCOUNTER — VIRTUAL VISIT (OUTPATIENT)
Dept: ONCOLOGY | Facility: CLINIC | Age: 60
End: 2020-09-29
Payer: COMMERCIAL

## 2020-09-29 ENCOUNTER — ONCOLOGY VISIT (OUTPATIENT)
Dept: ONCOLOGY | Facility: CLINIC | Age: 60
End: 2020-09-29
Payer: COMMERCIAL

## 2020-09-29 ENCOUNTER — INFUSION THERAPY VISIT (OUTPATIENT)
Dept: INFUSION THERAPY | Facility: CLINIC | Age: 60
End: 2020-09-29
Payer: COMMERCIAL

## 2020-09-29 VITALS
BODY MASS INDEX: 24.09 KG/M2 | SYSTOLIC BLOOD PRESSURE: 108 MMHG | HEART RATE: 66 BPM | RESPIRATION RATE: 16 BRPM | TEMPERATURE: 98.2 F | WEIGHT: 131.7 LBS | DIASTOLIC BLOOD PRESSURE: 74 MMHG | OXYGEN SATURATION: 100 %

## 2020-09-29 DIAGNOSIS — C79.51 BONE METASTASES: Primary | ICD-10-CM

## 2020-09-29 DIAGNOSIS — C79.51 BONE METASTASES: ICD-10-CM

## 2020-09-29 DIAGNOSIS — C50.912 BILATERAL MALIGNANT NEOPLASM OF BREAST IN FEMALE, UNSPECIFIED ESTROGEN RECEPTOR STATUS, UNSPECIFIED SITE OF BREAST (H): ICD-10-CM

## 2020-09-29 DIAGNOSIS — C50.911 BILATERAL MALIGNANT NEOPLASM OF BREAST IN FEMALE, UNSPECIFIED ESTROGEN RECEPTOR STATUS, UNSPECIFIED SITE OF BREAST (H): ICD-10-CM

## 2020-09-29 DIAGNOSIS — C50.919 METASTATIC BREAST CANCER: ICD-10-CM

## 2020-09-29 DIAGNOSIS — E87.6 HYPOKALEMIA: ICD-10-CM

## 2020-09-29 DIAGNOSIS — E87.6 HYPOKALEMIA: Primary | ICD-10-CM

## 2020-09-29 LAB
ALBUMIN SERPL-MCNC: 3.3 G/DL (ref 3.4–5)
ALP SERPL-CCNC: 98 U/L (ref 40–150)
ALT SERPL W P-5'-P-CCNC: 27 U/L (ref 0–50)
ANION GAP SERPL CALCULATED.3IONS-SCNC: 5 MMOL/L (ref 3–14)
AST SERPL W P-5'-P-CCNC: 24 U/L (ref 0–45)
BASOPHILS # BLD AUTO: 0 10E9/L (ref 0–0.2)
BASOPHILS NFR BLD AUTO: 0.9 %
BILIRUB SERPL-MCNC: 0.3 MG/DL (ref 0.2–1.3)
BUN SERPL-MCNC: 2 MG/DL (ref 7–30)
CALCIUM SERPL-MCNC: 8.4 MG/DL (ref 8.5–10.1)
CANCER AG27-29 SERPL-ACNC: 341 U/ML (ref 0–39)
CHLORIDE SERPL-SCNC: 107 MMOL/L (ref 94–109)
CO2 SERPL-SCNC: 30 MMOL/L (ref 20–32)
CREAT SERPL-MCNC: 0.56 MG/DL (ref 0.52–1.04)
DIFFERENTIAL METHOD BLD: ABNORMAL
EOSINOPHIL # BLD AUTO: 0 10E9/L (ref 0–0.7)
EOSINOPHIL NFR BLD AUTO: 0.9 %
ERYTHROCYTE [DISTWIDTH] IN BLOOD BY AUTOMATED COUNT: 15 % (ref 10–15)
GFR SERPL CREATININE-BSD FRML MDRD: >90 ML/MIN/{1.73_M2}
GLUCOSE SERPL-MCNC: 110 MG/DL (ref 70–99)
HCT VFR BLD AUTO: 35.9 % (ref 35–47)
HGB BLD-MCNC: 11.3 G/DL (ref 11.7–15.7)
IMM GRANULOCYTES # BLD: 0 10E9/L (ref 0–0.4)
IMM GRANULOCYTES NFR BLD: 0.9 %
LYMPHOCYTES # BLD AUTO: 0.6 10E9/L (ref 0.8–5.3)
LYMPHOCYTES NFR BLD AUTO: 19.5 %
MAGNESIUM SERPL-MCNC: 2.3 MG/DL (ref 1.6–2.3)
MCH RBC QN AUTO: 28.1 PG (ref 26.5–33)
MCHC RBC AUTO-ENTMCNC: 31.5 G/DL (ref 31.5–36.5)
MCV RBC AUTO: 89 FL (ref 78–100)
MONOCYTES # BLD AUTO: 0.6 10E9/L (ref 0–1.3)
MONOCYTES NFR BLD AUTO: 16.7 %
NEUTROPHILS # BLD AUTO: 2 10E9/L (ref 1.6–8.3)
NEUTROPHILS NFR BLD AUTO: 61.1 %
PLATELET # BLD AUTO: 206 10E9/L (ref 150–450)
POTASSIUM SERPL-SCNC: 4.1 MMOL/L (ref 3.4–5.3)
PROT SERPL-MCNC: 6.4 G/DL (ref 6.8–8.8)
RBC # BLD AUTO: 4.02 10E12/L (ref 3.8–5.2)
SODIUM SERPL-SCNC: 142 MMOL/L (ref 133–144)
WBC # BLD AUTO: 3.3 10E9/L (ref 4–11)

## 2020-09-29 PROCEDURE — 86300 IMMUNOASSAY TUMOR CA 15-3: CPT | Performed by: INTERNAL MEDICINE

## 2020-09-29 PROCEDURE — 99207 ZZC NO CHARGE NURSE ONLY: CPT

## 2020-09-29 PROCEDURE — 96409 CHEMO IV PUSH SNGL DRUG: CPT | Performed by: NURSE PRACTITIONER

## 2020-09-29 PROCEDURE — 96367 TX/PROPH/DG ADDL SEQ IV INF: CPT | Performed by: NURSE PRACTITIONER

## 2020-09-29 PROCEDURE — 99207 ZZC NO CHARGE LOS: CPT

## 2020-09-29 PROCEDURE — 99214 OFFICE O/P EST MOD 30 MIN: CPT | Mod: GT | Performed by: INTERNAL MEDICINE

## 2020-09-29 PROCEDURE — 83735 ASSAY OF MAGNESIUM: CPT | Performed by: INTERNAL MEDICINE

## 2020-09-29 PROCEDURE — 85025 COMPLETE CBC W/AUTO DIFF WBC: CPT | Performed by: INTERNAL MEDICINE

## 2020-09-29 PROCEDURE — 80053 COMPREHEN METABOLIC PANEL: CPT | Performed by: INTERNAL MEDICINE

## 2020-09-29 RX ORDER — MEPERIDINE HYDROCHLORIDE 25 MG/ML
25 INJECTION INTRAMUSCULAR; INTRAVENOUS; SUBCUTANEOUS EVERY 30 MIN PRN
Status: CANCELLED | OUTPATIENT
Start: 2020-09-29

## 2020-09-29 RX ORDER — EPINEPHRINE 1 MG/ML
0.3 INJECTION, SOLUTION INTRAMUSCULAR; SUBCUTANEOUS EVERY 5 MIN PRN
Status: CANCELLED | OUTPATIENT
Start: 2020-10-06

## 2020-09-29 RX ORDER — ZOLEDRONIC ACID 0.04 MG/ML
4 INJECTION, SOLUTION INTRAVENOUS ONCE
Status: COMPLETED | OUTPATIENT
Start: 2020-09-29 | End: 2020-09-29

## 2020-09-29 RX ORDER — DIPHENHYDRAMINE HYDROCHLORIDE 50 MG/ML
50 INJECTION INTRAMUSCULAR; INTRAVENOUS
Status: CANCELLED
Start: 2020-09-29

## 2020-09-29 RX ORDER — HEPARIN SODIUM (PORCINE) LOCK FLUSH IV SOLN 100 UNIT/ML 100 UNIT/ML
5 SOLUTION INTRAVENOUS
Status: CANCELLED | OUTPATIENT
Start: 2020-09-29

## 2020-09-29 RX ORDER — EPINEPHRINE 0.3 MG/.3ML
0.3 INJECTION SUBCUTANEOUS EVERY 5 MIN PRN
Status: CANCELLED | OUTPATIENT
Start: 2020-09-29

## 2020-09-29 RX ORDER — DIPHENHYDRAMINE HYDROCHLORIDE 50 MG/ML
50 INJECTION INTRAMUSCULAR; INTRAVENOUS
Status: CANCELLED
Start: 2020-10-06

## 2020-09-29 RX ORDER — HEPARIN SODIUM,PORCINE 10 UNIT/ML
5 VIAL (ML) INTRAVENOUS
Status: CANCELLED | OUTPATIENT
Start: 2020-09-29

## 2020-09-29 RX ORDER — ALBUTEROL SULFATE 0.83 MG/ML
2.5 SOLUTION RESPIRATORY (INHALATION)
Status: CANCELLED | OUTPATIENT
Start: 2020-10-06

## 2020-09-29 RX ORDER — METHYLPREDNISOLONE SODIUM SUCCINATE 125 MG/2ML
125 INJECTION, POWDER, LYOPHILIZED, FOR SOLUTION INTRAMUSCULAR; INTRAVENOUS
Status: CANCELLED
Start: 2020-09-29

## 2020-09-29 RX ORDER — NALOXONE HYDROCHLORIDE 0.4 MG/ML
.1-.4 INJECTION, SOLUTION INTRAMUSCULAR; INTRAVENOUS; SUBCUTANEOUS
Status: CANCELLED | OUTPATIENT
Start: 2020-09-29

## 2020-09-29 RX ORDER — METHYLPREDNISOLONE SODIUM SUCCINATE 125 MG/2ML
125 INJECTION, POWDER, LYOPHILIZED, FOR SOLUTION INTRAMUSCULAR; INTRAVENOUS
Status: CANCELLED
Start: 2020-10-06

## 2020-09-29 RX ORDER — NALOXONE HYDROCHLORIDE 0.4 MG/ML
.1-.4 INJECTION, SOLUTION INTRAMUSCULAR; INTRAVENOUS; SUBCUTANEOUS
Status: CANCELLED | OUTPATIENT
Start: 2020-10-06

## 2020-09-29 RX ORDER — LORAZEPAM 2 MG/ML
0.5 INJECTION INTRAMUSCULAR EVERY 4 HOURS PRN
Status: CANCELLED
Start: 2020-09-29

## 2020-09-29 RX ORDER — HEPARIN SODIUM (PORCINE) LOCK FLUSH IV SOLN 100 UNIT/ML 100 UNIT/ML
5 SOLUTION INTRAVENOUS
Status: CANCELLED | OUTPATIENT
Start: 2020-10-06

## 2020-09-29 RX ORDER — SODIUM CHLORIDE 9 MG/ML
1000 INJECTION, SOLUTION INTRAVENOUS CONTINUOUS PRN
Status: CANCELLED
Start: 2020-09-29

## 2020-09-29 RX ORDER — LORAZEPAM 2 MG/ML
0.5 INJECTION INTRAMUSCULAR EVERY 4 HOURS PRN
Status: CANCELLED
Start: 2020-10-06

## 2020-09-29 RX ORDER — SODIUM CHLORIDE 9 MG/ML
1000 INJECTION, SOLUTION INTRAVENOUS CONTINUOUS PRN
Status: CANCELLED
Start: 2020-10-06

## 2020-09-29 RX ORDER — HEPARIN SODIUM (PORCINE) LOCK FLUSH IV SOLN 100 UNIT/ML 100 UNIT/ML
5 SOLUTION INTRAVENOUS
Status: DISCONTINUED | OUTPATIENT
Start: 2020-09-29 | End: 2020-09-29 | Stop reason: HOSPADM

## 2020-09-29 RX ORDER — ALBUTEROL SULFATE 90 UG/1
1-2 AEROSOL, METERED RESPIRATORY (INHALATION)
Status: CANCELLED
Start: 2020-10-06

## 2020-09-29 RX ORDER — EPINEPHRINE 0.3 MG/.3ML
0.3 INJECTION SUBCUTANEOUS EVERY 5 MIN PRN
Status: CANCELLED | OUTPATIENT
Start: 2020-10-06

## 2020-09-29 RX ORDER — HEPARIN SODIUM,PORCINE 10 UNIT/ML
5 VIAL (ML) INTRAVENOUS
Status: CANCELLED | OUTPATIENT
Start: 2020-10-06

## 2020-09-29 RX ORDER — EPINEPHRINE 1 MG/ML
0.3 INJECTION, SOLUTION INTRAMUSCULAR; SUBCUTANEOUS EVERY 5 MIN PRN
Status: CANCELLED | OUTPATIENT
Start: 2020-09-29

## 2020-09-29 RX ORDER — ALBUTEROL SULFATE 90 UG/1
1-2 AEROSOL, METERED RESPIRATORY (INHALATION)
Status: CANCELLED
Start: 2020-09-29

## 2020-09-29 RX ORDER — MEPERIDINE HYDROCHLORIDE 25 MG/ML
25 INJECTION INTRAMUSCULAR; INTRAVENOUS; SUBCUTANEOUS EVERY 30 MIN PRN
Status: CANCELLED | OUTPATIENT
Start: 2020-10-06

## 2020-09-29 RX ORDER — ALBUTEROL SULFATE 0.83 MG/ML
2.5 SOLUTION RESPIRATORY (INHALATION)
Status: CANCELLED | OUTPATIENT
Start: 2020-09-29

## 2020-09-29 RX ADMIN — HEPARIN SODIUM (PORCINE) LOCK FLUSH IV SOLN 100 UNIT/ML 5 ML: 100 SOLUTION at 09:50

## 2020-09-29 RX ADMIN — ZOLEDRONIC ACID 4 MG: 0.04 INJECTION, SOLUTION INTRAVENOUS at 11:18

## 2020-09-29 RX ADMIN — Medication 250 ML: at 11:16

## 2020-09-29 RX ADMIN — HEPARIN SODIUM (PORCINE) LOCK FLUSH IV SOLN 100 UNIT/ML 5 ML: 100 SOLUTION at 12:14

## 2020-09-29 ASSESSMENT — PAIN SCALES - GENERAL: PAINLEVEL: NO PAIN (0)

## 2020-09-29 NOTE — NURSING NOTE
"Shaneka Alvarez is a 60 year old female who is being evaluated via a billable video visit.      The patient has been notified of following:     \"This video visit will be conducted via a call between you and your physician/provider. We have found that certain health care needs can be provided without the need for an in-person physical exam.  This service lets us provide the care you need with a video conversation.  If a prescription is necessary we can send it directly to your pharmacy.  If lab work is needed we can place an order for that and you can then stop by our lab to have the test done at a later time.    Video visits are billed at different rates depending on your insurance coverage.  Please reach out to your insurance provider with any questions.    If during the course of the call the physician/provider feels a video visit is not appropriate, you will not be charged for this service.\"    Patient has given verbal consent for Video visit? Yes  How would you like to obtain your AVS? MyChart    Video-Visit Details    Type of service:  Video Visit    Originating Location (pt. Location): Other in clinic    Distant Location (provider location):  [facility]     Platform used for Video Visit: [facility]      SYMPTOM QUESTIONNAIRE    Pain: no    Nausea/Vomiting: no    Mouth Sores: no    Shortness of Breath: no    Smoking: no    Fever or Chills: no    Hard Stools: no    Soft Stools: no    Weight Loss: no    Weakness: no    Burning, numbness or tingling in hands or feet: unchanged    Problems with skin or swelling: no    Memory Loss: no    Anxiety or Depression: no    Trouble Sleeping: no    Wondering if she needs to continue potassium.    Yuni Botello LPN on 9/29/2020 at 10:20 AM              "

## 2020-09-29 NOTE — PROGRESS NOTES
ONCOLOGY FOLLOW UP NOTE: 9/29/2020        I took the history from reviewing the previous notes that she was following with Dr. Amaya.  I have copied and updated from prior notes.    ONCOLOGY History:    1.  January 2006:  Diagnosed with Stage IIB, T2 N1 M0 invasive lobular carcinoma of the right breast.  Final pathology showed a 4.5 x 3.8 x 2.5 cm, 01/14 lymph nodes positive.  Estrogen, progesterone receptor positive, HER-2 negative.     2.  Genetics.  BRCA1 and 2 mutations not detected. 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       THERAPY TO DATE:   1.  2006:  ECOG 2104 protocol of dose-dense Adriamycin, Cytoxan and Avastin x4 cycles followed by Taxol and Avastin x4 cycles.   2.  03/2007:  Completed 1 year Avastin.   3.  11/2006-01/2007:  Radiotherapy to the right breast of 5040 cGy.   4.  03/[id number]:  Aromasin.  Stopped after moving to the [facility].   5.  01/2016:  Right modified radical mastectomy with latissimus dorsi flap reconstruction and a left prophylactic mastectomy with latissimus dorsi flap reconstruction.     She recently had MRI of the brain as a follow-up for multiple sclerosis and there were multiple calvarial lesions noted which was suspicious for metastatic disease.  There was no evidence of new multiple sclerosis lesions.  She had a PET scan on 8/30/2019 which showed widespread bone metastatic lesions (calvarium, spine, sacrum, pelvis, ribs most prominent at C7, T3, L1 and L4), hypermetabolic 3 cm lesion in LLL, and mediastinal/hilar LN. CEA wnl at 1.9 and C27-29 elevated to 415 (28 on 8/23/16). A CT guided biopsy of the right iliac bone was obtained on 9/4/19, pathology consistent with metastatic adenocarcinoma (breast), ER/NV negative, HER-2 negative PDL 1 < 1%. A second biopsy was take of the LLL nodule via EBUS on 9/5/19 did not show any malignancy.    C/T/L spine MRI 8/3/19 to 9/2/19 with numerous enhancing lesions of the C, T and L spine, largest around T9 and L1. No  evidence of cord compression. Has a L1 compression fracture and impending L4.     Received radiation T12-L5 3000 cGy in 10 fractions from 9/6/2019 till 9/18/2019.  Neurosurgery recommended no surgical intervention but to wear Eaton brace when out of bed and HOB >30 degrees    She started palliative Xeloda 2000/1500 on 10/1/2019 but after just a few doses she noticed nausea, red eyes blurred vision, loss of appetite.  She stopped taking it after 5 doses and was seen by nurse practitioner on 10/7/2019 when she was improving.  At that point she was restarted on Xeloda at a lower dose of 1000 mg twice a day.  As she was not able to tolerate even the lower dose with extreme nausea and vomiting and feeling extremely fatigued and blurry vision, we decided on stopping Xeloda.    We decided on repeating scans and MRI brain on 10/25/2019 showed no intracranial metastatic disease.   Multiple enhancing calvarial lesions may be increased in number and are suggestive of metastatic disease. Thinner imaging on the current study may identify the smaller lesions and may be responsible for  identified more lesions.   There is a single focus of T2 hyperintense signal within the anterior right temporal lobe may represent interval demyelination. There is no evidence for active demyelination.    PET/CT on 11/1/2019 showed favorable response to therapy and Overall FDG uptake within scattered osseous metastases is decreased since 8/30/2019, particularly within the pelvis. Increased sclerotic appearance of L1 and L4 pathologic compression deformities, likely sequela of recently completed palliative radiation therapy.    No significant FDG uptake in previously demonstrated hypermetabolic mediastinal lymph nodes. Biopsy negative on 9/5/2019.  There is also decreased FDG uptake in the left lower lobe (biopsy negative for  malignancy), now with dense consolidation containing air bronchograms suggestive of infection versus aspiration.  There is  diffuse FDG uptake within the esophagus, consistent with esophagitis.    Because of intolerance to Xeloda we decided on switching to weekly paclitaxel on 11/5/2019.    C#2 Taxol 12/4/19- started with 70mg/m2 dose reduction    C#3 Taxol 12/30/2019     PET/CT on 1/24/2020 showed progression of the disease in some of the bone lesions including increase in size and lytic lesion within the vertebral body of C4 measuring 1 cm as opposed to 0.6 cm previously and a new central soft tissue nodule with SUV max 4.1 when previously it was non-hypermetabolic.  There is also some progression noted in the right proximal femur and right iliac bone.  There is decreased metabolic uptake in the right lamina of T9 with SUV max 4.7 when previously it was 8.0.  Other lesions are stable.    CA 27-29 also had increased significantly and it was 257 on 1/28/2020.    We decided on switching to eribulin on 1/28/2020.    C#2 2/18/2020  C#2 D#8 2/25/2020    C#3 D#1 3/18/2020 -delayed by 1 week as per patient's preference because she wanted to visit her family.    She had a repeat PET/CT on 3/27/2020 which showed stable size of the bone metastatic lesions although the FDG avidity was less, consistent with treatment response.    She had MRI of the thoracic spine on 4/3/2020 and it was compared to the one which was done in August 2019 and it showed increased size of several metastatic lesions most notably at T9 but also at T5-T7.  Other lesions at T10, T11 and T12 appeared improved.    CA 27-29 was also down to 222 on 3/18/2020.    Cycle #4 eribulin ( dose reduced to 1.2 mg/m2 )-4/7/2020-   Cycle #5 Eribulin ( 1.2 mg/m2 )- 4/28/2020   Cycle #6 Eribulin ( 1.2 mg/m2 )- 5/19/2020     Cycle #7 Eribulin ( 1.2 mg/m2 )- 6/9/2020    Cycle #8 Eribulin ( 1.2 mg/m2 )- 6/30/2020     She had a repeat PET scan on 7/17/2020 which showed incidental finding of new acute bilateral pulmonary embolism in the distal left main pulmonary artery extending in the left  upper and lower lobes and in the segmental and branch arteries in the right lower lobe.    It also showed mildly increased FDG avidity in the lytic lesion in the T9 lamina and right pedicle with SUV max 5.3, previously 3.9.  That is also slightly increased FDG uptake to the left anterior fifth rib at the costochondral junction SUV max 3.9, previously 2.5.  There is slightly decreased FDG uptake in the right femoral neck lesion SUV max 2.2, previously 2.9.  FDG uptake in other bone lesions is fairly stable.  No new metastasis are seen.    CA 27-29 was 308 on 6/30/2020.  It was 286 on 5/19/2020.    Overall this is consistent with only slight progression versus stable disease.    She was started on Lovenox.    Cycle #9 eribulin 7/21/2020. CA 27-29 was 238    C#10 eribulin 8/18/2020. ( delayed by one week for ANC 0.9 )    C#11 Eribulin 9/8/2020    C#12 Eribulin 9/29/2020       I had also recommended starting Zometa so she got dental clearance to start with that.  She received Zometa on 10/23/2019 and 1/14/2020 and 4/7/2020 and 6/30/2020 on a 3 monthly schedule.      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  It was thought that this could be due to Taxol as patient reported to the ophthalmologist that she was on Taxol in September 2019 when her symptoms started.  But she was not on Taxol in September 2019 when she started noticing the visual changes so Taxol is not responsible for this.  The first dose of Taxol was on 11/5/2019.       Interval history.  This is a video visit.   She mentions that she is doing well.  Last Wednesday she had nausea and vomiting and a little bit of diarrhea so she went to the emergency room.  By that time her diarrhea had resolved but she was still very nauseous so she was given Zofran IV fluids and she was found to have hypokalemia which was repleted.  She felt well right after receiving the fluids and since then she has felt well.  Otherwise  chemotherapy has been going well.  She denies any worsening of the pain or neuropathy.  She takes MS Contin and immediate release morphine at night for the back pain and she does not believe that it has significantly changed.  Overall energy is good.  Denies any fevers or infections.  No new swellings.  Her vision overall has significantly improved although lately it has been a stable.      ECOG 1    ROS:  A comprehensive ROS was otherwise neg            I reviewed other history in epic as below.       PAST MEDICAL HISTORY:     1.  Breast cancer  2.  Multiple sclerosis.   3.  Depression.   4.  Migraines.   5.  Hypertension.   6.  Cholecystectomy and umbilical hernia repair.     SOCIAL HISTORY: She smoked for 5 years but quit many years ago.  Drinks alcohol socially.  She lives with her .  She is a teacher in middle school.       FAMILY HISTORY: She mentions that her mother had breast cancer at 49.  Both grandmothers had breast cancer but she is not sure of the age.  A couple of cousins have cancers.  Patient has 3 children who are healthy.    Current Outpatient Medications   Medication     acetaminophen (TYLENOL) 500 MG tablet     apixaban ANTICOAGULANT (ELIQUIS) 5 MG tablet     busPIRone (BUSPAR) 15 MG tablet     calcium citrate-vitamin D (CITRACAL) 315-250 MG-UNIT TABS     Cholecalciferol (VITAMIN D3 PO)     cyclopentolate (CYCLOGYL) 1 % ophthalmic solution     diclofenac (VOLTAREN) 1 % topical gel     ibuprofen (ADVIL/MOTRIN) 600 MG tablet     LORazepam (ATIVAN) 0.5 MG tablet     magnesium 250 MG tablet     morphine (MS CONTIN) 15 MG CR tablet     morphine (MSIR) 15 MG IR tablet     naloxone (NARCAN) 1 mg/mL for intranasal kit (2 syringes with 2 mucosal atomizer device)     polyethylene glycol (MIRALAX/GLYCOLAX) packet     prochlorperazine (COMPAZINE) 10 MG tablet     propranolol (INDERAL LA) 60 MG 24 hr capsule     senna-docusate (SENOKOT-S/PERICOLACE) 8.6-50 MG tablet     syringe/needle, disp, (B-D  "INTEGRA SYRINGE) 23G X 1\" 3 ML MISC     traZODone (DESYREL) 50 MG tablet     venlafaxine (EFFEXOR-ER) 225 MG TB24 24 hr tablet     No current facility-administered medications for this visit.      Facility-Administered Medications Ordered in Other Visits   Medication     heparin 100 UNIT/ML injection 5 mL     sodium chloride (PF) 0.9% PF flush 10-20 mL        Allergies   Allergen Reactions     Bactrim [Sulfamethoxazole W/Trimethoprim]      Internal bleeding     Copaxone [Glatiramer] Hives          PHYSICAL EXAMINATION:     /74 (BP Location: Left arm)   Pulse 66   Temp 98.2  F (36.8  C) (Oral)   Resp 16   Wt 59.7 kg (131 lb 11.2 oz)   SpO2 100%   BMI 24.09 kg/m    Wt Readings from Last 4 Encounters:   09/29/20 59.7 kg (131 lb 11.2 oz)   09/24/20 56.8 kg (125 lb 3.2 oz)   09/15/20 60.8 kg (134 lb 1.6 oz)   09/08/20 59.4 kg (130 lb 14.4 oz)       Constitutional.  Looks well and in no apparent distress.   Eyes.  Without eye redness or apparent jaundice.   Respiratory.  Non labored breathing. Speaking in full sentences.    Skin.  No concerning skin rashes on the skin visualized.   Neurological.  Is alert and oriented.  Psychiatric.  Mood and affect seem appropriate.      The rest of a comprehensive physical examination is deferred due to [facility] video visit restrictions.        Labs and Imaging.    Reviewed    Combined Report of:    PET and CT on  7/17/2020 3:52 PM :     1. PET of the neck, chest, abdomen, and pelvis.  2. PET CT Fusion for Attenuation Correction and Anatomical  Localization:    3. Diagnostic CT scan of the chest, abdomen, and pelvis with  intravenous contrast for interpretation.  3. CT of the chest, abdomen and pelvis obtained for diagnostic  interpretation.  4. 3D MIP and PET-CT fused images were processed on an independent  workstation and archived to PACS and reviewed by a radiologist.     Technique:     1. PET: The patient received 13.72 mCi of F-18-FDG; the serum " glucose  was 97 prior to administration, body weight was 59 kg. Images were  evaluated in the axial, sagittal, and coronal planes as well as the  rotational whole body MIP. Images were acquired from the Vertex to the  Feet.     UPTAKE WAS MEASURED AT 60 MINUTES.      BACKGROUND:  Liver SUV max= 3.5,   Aorta Blood SUV Max: 2.6.      2. CT: Volumetric acquisition for clinical interpretation of the  chest, abdomen, and pelvis acquired at 3 mm sections after the  uneventful administration of intravenous contrast. The chest, abdomen,  and pelvis were evaluated at 5 mm sections in bone, soft tissue, and  lung windows.       The patient received 80 cc. Of Isovue 370 intravenously and 500 mL  oral Breeza contrast for the examination.       3. 3D MIP and PET-CT fused images were processed on an independent  workstation and archived to PACS and reviewed by a radiologist.     INDICATION: Metastatic breast cancer     ADDITIONAL INFORMATION OBTAINED FROM EMR: 60-year-old woman with  history of invasive lobular carcinoma of the right breast diagnosed in  2006 treated with chemoradiation and hormonal therapy. Underwent  bilateral mastectomy 2016. Radiotherapy to the lumbar spine T12-L5 in  September 2019. Currently undergoing chemotherapy with Eribulin.     COMPARISON: MRI thoracic spine 4/3/2020, PET-CT 3/27/2020, MRI  cervical spine 1/31/2020, PET-CT 1/24/2020     FINDINGS:      HEAD/NECK:  There is no suspicious FDG uptake in the neck. Symmetric FDG uptake to  the aryepiglottic folds is favored to be inflammatory in etiology.     Trace mucosal thickening in the right maxillary sinus. The remaining  paranasal sinuses are relatively clear. The mastoid air cells are  clear. No masses, mass effect or pathologically enlarged lymph nodes  are evident. Small calcifications in or adjacent to the right palatine  tonsil are similar to prior and suggest sequela of prior inflammation,  versus less likely sialoliths. No submandibular ductal  ectasia. The  thyroid gland is unremarkable.     CHEST:  There is no suspicious FDG uptake in the chest. Generalized FDG uptake  throughout the esophagus is similar to prior, for example in the the  distal esophagus/small hiatal hernia with SUV max of 5.7 (series 5,  image 202).     The central tracheobronchial tree is patent. No pleural effusion or  pneumothorax. No focal consolidation. Heart size is within normal  limits. No pericardial effusion. Filling defects within the distal  main left pulmonary artery extending into the left lower lobe  segmental and branch arteries, the proximal left upper lobe segmental  arteries, as well as additional filling defects within the right lower  lobe segmental branch arteries. These are new since 3/27/2020, and are  consistent with pulmonary emboli. No enlarged or hypermetabolic  mediastinal, hilar, or axillary lymph nodes. Postsurgical changes of  right axillary lymph node dissection. Post surgical changes of  bilateral mastectomy with implant reconstruction.     ABDOMEN AND PELVIS:  There is no suspicious FDG uptake in the abdomen or pelvis.     The liver, pancreas, spleen, and adrenal glands are unremarkable. The  gallbladder is surgically absent. No intrahepatic or extra hepatic  biliary ductal dilatation. Symmetric nephrographic enhancement. No  hydronephrosis. Bladder and uterus are unremarkable. Normal caliber of  the small and large bowel. No fluid collection or free fluid. No  enlarged or hypermetabolic lymph nodes in the abdomen or pelvis.     LOWER EXTREMITIES:   No abnormal masses or hypermetabolic soft tissue lesions.     BONES:   Increased FDG uptake to the lytic lesion in the T9 lamina and T10  right pedicle, with SUV max 5.3 (series 5, image 206), previously 3.9.        Increased FDG uptake to the left anterior fifth rib at the  costochondral junction, SUV max 3.9 (series 5, image 211), previously  2.5. Additional chronic bilateral rib fractures with mild FDG  uptake  are similar to prior.     Additional scattered lytic lesions in the calvarium, spine, pelvis,  and right femoral neck are similar to prior and demonstrate only the  levels of FDG uptake. For example (series 5):  - Image 110: Left side of C4 vertebral body, SUV max 2.7, previously  2.7.  - Image 328: Right iliac crest SUV max 2.7, previously 2.6.  - Image 341: RIght iliac crest SUV max 2.1, previously 2.2.  - Image 379: Right femoral neck SUV max 2.2, previously 2.9.     Multilevel degenerative changes in the spine. Unchanged severe  compression deformity of the L1 vertebral body with greater than 75%  central height loss. Relative photopenia to the T12-L5 vertebral  bodies on PET images corresponds with history of radiotherapy.                                                                         IMPRESSION: In this patient with a history of metastatic breast cancer  status post chemotherapy, radiotherapy, and bilateral mastectomies:     1. New acute bilateral pulmonary emboli in the distal left main  pulmonary artery extending in the left upper and lower lobes, and in  the segmental and branch arteries in the right lower lobe.     2. Increased FDG uptake to the lytic lesion in the T9-10 posterior  elements concerning for progression of viable tumor.     3. Increased FDG uptake to the anterior left 5th rib at the  costochondral junction, indeterminant. Differential diagnosis includes  degenerative uptake, posttraumatic changes, and progressive  metastasis. Trauma is felt less likely given absence of traumatic  findings on CT, although multiple additional chronic rib fractures are  seen.     4. Additional lytic lesions with low levels of FDG uptake are similar  to 3/27/2020 and most consistent with treated disease.     5. Generalized diffuse FDG uptake to the esophagus and small hiatal  hernia, most consistent with inflammation/esophagitis.      ASSESSMENT AND PLAN:   1.  History of stage IIB, T2 N1 M0  invasive lobular carcinoma of the right breast.  It was initially diagnosed in 2006 and at that time it was hormone receptor positive, HER-2 negative.  She was treated on ECOG 2104 protocol with dose-dense Adriamycin, Cytoxan and Avastin x4 cycles followed by Taxol and Avastin x4 cycles followed by a Avastin for 1 year.  She also received radiation to the right breast which she completed in January 2007 (5040 cGy).  She took Aromasin from 2007 to 2014.    Now she has metastatic breast cancer with extensive involvement of the bones.  There is also a left lower lobe hypermetabolic lesion and mediastinal lymph nodes which are hypermetabolic.  The biopsy from the right iliac bone is consistent with metastatic lobular breast cancer but it is hormone receptor and HER-2 negative and PDL 1 is also negative.    She has received 3000 cGy of palliative radiation to T12-L5 from 9/6/2019 till 9/18/2019.    She was started on palliative Xeloda but she was unable to tolerate even the dose reduced medication.        We decided on repeating scans and MRI brain on 10/25/2019 showed no intracranial metastatic disease.   Multiple enhancing calvarial lesions may be increased in number and are suggestive of metastatic disease. Thinner imaging on the current study may identify the smaller lesions and may be responsible for identified more lesions.   There is a single focus of T2 hyperintense signal within the anterior right temporal lobe may represent interval demyelination. There is no evidence for active demyelination.    PET/CT on 11/1/2019 showed favorable response to therapy and Overall FDG uptake within scattered osseous metastases is decreased since 8/30/2019, particularly within the pelvis. Increased sclerotic appearance of L1 and L4 pathologic compression deformities, likely sequela of recently completed palliative radiation therapy.    No significant FDG uptake in previously demonstrated hypermetabolic mediastinal lymph nodes.  Biopsy negative on 9/5/2019.  There is also decreased FDG uptake in the left lower lobe (biopsy negative for malignancy), now with dense consolidation containing air bronchograms suggestive of infection versus aspiration.  There is diffuse FDG uptake within the esophagus, consistent with esophagitis.    Because of intolerance to Xeloda we decided on switching to weekly paclitaxel on 11/5/2019.    For better tolerance we decreased the dose of paclitaxel to 70 mg/m  with cycle #2.  She has completed 3 cycles of Taxol and the last chemotherapy was on 1/14/2020.      PET/CT on 1/24/2020 showed progression of the disease in some of the bone lesions including increase in size and lytic lesion within the vertebral body of C4 measuring 1 cm as opposed to 0.6 cm previously and a new central soft tissue nodule with SUV max 4.1 when previously it was non-hypermetabolic.  There is also some progression noted in the right proximal femur and right iliac bone.  There is decreased metabolic uptake in the right lamina of T9 with SUV max 4.7 when previously it was 8.0.  Other lesions are stable.    There are no pathologically enlarged mediastinal, hilar or axillary lymph nodes. Calcified subcarinal lymph nodes. There are no suspicious lung nodules or evidence for infection and previous left lower lobe consolidation has resolved.     CA 27-29 also had increased significantly and it was 257 on 1/28/2020.    Due to progression we switched to eribulin on 1/28/2020.        After 3 cycles, she had a repeat PET/CT on 3/27/2020 which showed a stable size of the bone metastatic lesions although the FDG avidity was less, consistent with treatment response.    She had MRI of the thoracic spine on 4/3/2020 and it was compared to the one which was done in August 2019 and it showed increased size of several metastatic lesions most notably at T9 but also at T5-T7.  Other lesions at T10, T11 and T12 appeared improved.    CA 27-29 was also down to  222 on 3/18/2020.    I believe overall this is consistent with a partial response to the treatment.  Overall she is tolerating eribulin well with some worsening of neuropathy and cytopenias.     We decided on continuing the chemotherapy with some modifications and she got cycle #4 with slightly decreased dose of eribulin to 1.2 mg/m2.      After 8 cycles of eribulin repeat PET CT on 7/17/2020 showed only minimally increased FDG uptake in T9 and T10 and in the left anterior fifth rib near the costochondral junction.  That is slightly decreased FDG avidity in the right femoral neck lesion.  Otherwise bone disease is a stable.  No other evidence of metastatic disease is found.    CA 27-29 on 6/30/2020 was 308.    We decided on continuing the same chemotherapy because overall clinical picture was consistent with stable to only minimally progressive disease and she was tolerating treatments well with decent quality of life.    Cycle #10 was delayed by 1 week because ANC was 0.9.  She has received 11 cycles up until now and overall she is tolerating it well.  We will proceed with cycle #12 today.  If she continues to do well and CA 19-9 does not significantly rise, then I will repeat scans after cycle #13.      Recently FDA approved sacituzumab govitecan-hziy (Trodelvy) for TNBC, based on the results of the phase II IMMU-132-01 clinical trial. So this might become an option for her in the future.       Nausea and vomiting.  Currently asymptomatic.  I told her that she should keep a close watch on this and as soon as she starts to develop nausea she should start taking antinausea medications because she usually does not take these regularly as she typically does not get significant nausea and vomiting.    Hypokalemia.  From the nausea vomiting and diarrhea.  This has resolved and potassium is normal today.      Leukopenia/anemia.  Mild from chemotherapy.  Continue to observe.      Bilateral pulmonary emboli.  These were  incidentally found on 7/17/2020.  Continue Eliquis.     Neuropathy.  Overall neuropathy has stabilized after decreasing the dose of eribulin.  She has baseline balance issues and heat and cold sensitivity from multiple sclerosis.       Pain management.  Pain is well controlled on morphine sulfate and immediate release morphine as needed.  She follows with palliative care..  I refilled her morphine today.  Continue to follow with palliative care.       Previously she got palliative radiation to T12-L5 3000 cGy in 10 fractions from 9/6/2019 till 9/18/2019.  She was evaluated by orthopedics Dr. Bipin Elliott on 11/1/2019 and conservative management was recommended.      Bone metastasis.  Continue Zometa every 3 months.  She last received on 6/30/2020.  She will get it today 9/30/2020.  Continue vitamin D and calcium.      We did not address the following today.    Sleeping problems.  I refilled trazodone.    Vision changes.    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  It was thought that this could be due to Taxol as patient reported to the ophthalmologist that she was on Taxol in September 2019 when her symptoms started.  But she was not on Taxol in September 2019 when she started noticing the visual changes so Taxol is not responsible for this.  The first dose of Taxol was on 11/5/2019.   Her vision is much better now.  She will continue to follow closely with ophthalmology.       Increased FDG ability in the colon.  She had FDG uptake in the cecum and ascending colon but no corresponding lesion was seen on the CT scan.  She is completely asymptomatic so at this time we will continue to observe.    Fatigue.  This is from the cancer and chemotherapy.  I again encouraged her to do regular exercise.  Continue to follow with cancer rehab.        Discussion regarding healthcare directives.  On previous visit she told me that she will bring the health care directive for our  records but she forgot so I told her to bring it on next visit.      Breast implant removal.  She tells me that she was planning to do breast implant removal because there was a recall on this.  Her surgery was scheduled for 9/24/2019.  Because of this new development of metastatic breast cancer and her recent radiation, surgery has been canceled.  We discussed that at this time treatment for metastatic breast cancer would take precedence over the other surgery as the other surgery would require her to be off chemotherapy for several weeks and in that case the chances of cancer progression would be high and I would not recommend that.    Multiple sclerosis.  She will continue to follow with her neurologist Dr. Quiles.  Currently multiple sclerosis is under control and currently she is not taking any medications.    See me back in 3 weeks.    I answered all of her questions to her satisfaction and she agrees with this plan.        Dewayne Zepeda MD    Video start time. 10:45 AM  Video stop time. 10:58 AM

## 2020-09-29 NOTE — PROGRESS NOTES
Infusion Nursing Note:  Shaneka Alvarez presents today for C12D1 Eribulin and Zometa.    Patient seen by provider today: Yes: Dr Zepeda   present during visit today: Not applicable.     Note:   States she is feeling better after her ER visit on 9/24/20  For vomiting.    Patient states she is taking Vitamin D.  Is not taking Calcium.  Recommended patient take 500-600 mg of elemental Calcium twice a day.    Patient denies dental issues at this time. Patient will inform dentist that she got Zometa infusion, prior to any invasive dental work in the future.    Reminded patient to drink 6-8 glasses of water today, and tomorrow, to protect kidneys.      Intravenous Access:  Implanted Port.    Treatment Conditions:  Lab Results   Component Value Date    HGB 11.3 09/29/2020     Lab Results   Component Value Date    WBC 3.3 09/29/2020      Lab Results   Component Value Date    ANEU 2.0 09/29/2020     Lab Results   Component Value Date     09/29/2020      Lab Results   Component Value Date     09/29/2020                   Lab Results   Component Value Date    POTASSIUM 4.1 09/29/2020           Lab Results   Component Value Date    MAG 2.3 09/29/2020            Lab Results   Component Value Date    CR 0.56 09/29/2020                   Lab Results   Component Value Date    RAFAELA 8.4 09/29/2020                Lab Results   Component Value Date    BILITOTAL 0.3 09/29/2020           Lab Results   Component Value Date    ALBUMIN 3.3 09/29/2020                    Lab Results   Component Value Date    ALT 27 09/29/2020           Lab Results   Component Value Date    AST 24 09/29/2020       Results reviewed, labs MET treatment parameters, ok to proceed with treatment.      Post Infusion Assessment:  Patient tolerated infusion without incident.  Blood return noted pre and post infusion.  Site patent and intact, free from redness, edema or discomfort.  No evidence of extravasations.  Access discontinued per protocol.        Discharge Plan:   Patient will return 10/6/2020 for next appointment.   Patient discharged in stable condition accompanied by: self.  Departure Mode: Ambulatory.    Lauren Shaw RN

## 2020-09-29 NOTE — LETTER
9/29/2020         RE: Shaneka Alvarez  [id number] [facility] [id number]        Dear Colleague,    Thank you for referring your patient, Shaneka Alvarez, to the [facility]. Please see a copy of my visit note below.      ONCOLOGY FOLLOW UP NOTE: 9/29/2020        I took the history from reviewing the previous notes that she was following with Dr. Amaya.  I have copied and updated from prior notes.    ONCOLOGY History:    1.  January 2006:  Diagnosed with Stage IIB, T2 N1 M0 invasive lobular carcinoma of the right breast.  Final pathology showed a 4.5 x 3.8 x 2.5 cm, 01/14 lymph nodes positive.  Estrogen, progesterone receptor positive, HER-2 negative.     2.  Genetics.  BRCA1 and 2 mutations not detected. 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       THERAPY TO DATE:   1. 2006:  ECOG 2104 protocol of dose-dense Adriamycin, Cytoxan and Avastin x4 cycles followed by Taxol and Avastin x4 cycles.   2.  03/2007:  Completed 1 year Avastin.   3.  11/2006-01/2007:  Radiotherapy to the right breast of 5040 cGy.   4.  [id number]:  Aromasin.  Stopped after moving to the [facility].   5.  01/2016:  Right modified radical mastectomy with latissimus dorsi flap reconstruction and a left prophylactic mastectomy with latissimus dorsi flap reconstruction.     She recently had MRI of the brain as a follow-up for multiple sclerosis and there were multiple calvarial lesions noted which was suspicious for metastatic disease.  There was no evidence of new multiple sclerosis lesions.  She had a PET scan on 8/30/2019 which showed widespread bone metastatic lesions (calvarium, spine, sacrum, pelvis, ribs most prominent at C7, T3, L1 and L4), hypermetabolic 3 cm lesion in LLL, and mediastinal/hilar LN. CEA wnl at 1.9 and C27-29 elevated to 415 (28 on 8/23/16). A CT guided biopsy of the right iliac bone was obtained on 9/4/19, pathology consistent with metastatic adenocarcinoma (breast), ER/VA negative,  HER-2 negative PDL 1 < 1%. A second biopsy was take of the LLL nodule via EBUS on 9/5/19 did not show any malignancy.    C/T/L spine MRI 8/3/19 to 9/2/19 with numerous enhancing lesions of the C, T and L spine, largest around T9 and L1. No evidence of cord compression. Has a L1 compression fracture and impending L4.     Received radiation T12-L5 3000 cGy in 10 fractions from 9/6/2019 till 9/18/2019.  Neurosurgery recommended no surgical intervention but to wear Romance brace when out of bed and HOB >30 degrees    She started palliative Xeloda 2000/1500 on 10/1/2019 but after just a few doses she noticed nausea, red eyes blurred vision, loss of appetite.  She stopped taking it after 5 doses and was seen by nurse practitioner on 10/7/2019 when she was improving.  At that point she was restarted on Xeloda at a lower dose of 1000 mg twice a day.  As she was not able to tolerate even the lower dose with extreme nausea and vomiting and feeling extremely fatigued and blurry vision, we decided on stopping Xeloda.    We decided on repeating scans and MRI brain on 10/25/2019 showed no intracranial metastatic disease.   Multiple enhancing calvarial lesions may be increased in number and are suggestive of metastatic disease. Thinner imaging on the current study may identify the smaller lesions and may be responsible for  identified more lesions.   There is a single focus of T2 hyperintense signal within the anterior right temporal lobe may represent interval demyelination. There is no evidence for active demyelination.    PET/CT on 11/1/2019 showed favorable response to therapy and Overall FDG uptake within scattered osseous metastases is decreased since 8/30/2019, particularly within the pelvis. Increased sclerotic appearance of L1 and L4 pathologic compression deformities, likely sequela of recently completed palliative radiation therapy.    No significant FDG uptake in previously demonstrated hypermetabolic mediastinal lymph  nodes. Biopsy negative on 9/5/2019.  There is also decreased FDG uptake in the left lower lobe (biopsy negative for  malignancy), now with dense consolidation containing air bronchograms suggestive of infection versus aspiration.  There is diffuse FDG uptake within the esophagus, consistent with esophagitis.    Because of intolerance to Xeloda we decided on switching to weekly paclitaxel on 11/5/2019.    C#2 Taxol 12/4/19- started with 70mg/m2 dose reduction    C#3 Taxol 12/30/2019     PET/CT on 1/24/2020 showed progression of the disease in some of the bone lesions including increase in size and lytic lesion within the vertebral body of C4 measuring 1 cm as opposed to 0.6 cm previously and a new central soft tissue nodule with SUV max 4.1 when previously it was non-hypermetabolic.  There is also some progression noted in the right proximal femur and right iliac bone.  There is decreased metabolic uptake in the right lamina of T9 with SUV max 4.7 when previously it was 8.0.  Other lesions are stable.    CA 27-29 also had increased significantly and it was 257 on 1/28/2020.    We decided on switching to eribulin on 1/28/2020.    C#2 2/18/2020  C#2 D#8 2/25/2020    C#3 D#1 3/18/2020 -delayed by 1 week as per patient's preference because she wanted to visit her family.    She had a repeat PET/CT on 3/27/2020 which showed stable size of the bone metastatic lesions although the FDG avidity was less, consistent with treatment response.    She had MRI of the thoracic spine on 4/3/2020 and it was compared to the one which was done in August 2019 and it showed increased size of several metastatic lesions most notably at T9 but also at T5-T7.  Other lesions at T10, T11 and T12 appeared improved.    CA 27-29 was also down to 222 on 3/18/2020.    Cycle #4 eribulin ( dose reduced to 1.2 mg/m2 )-4/7/2020-   Cycle #5 Eribulin ( 1.2 mg/m2 )- 4/28/2020   Cycle #6 Eribulin ( 1.2 mg/m2 )- 5/19/2020     Cycle #7 Eribulin ( 1.2 mg/m2 )-  6/9/2020    Cycle #8 Eribulin ( 1.2 mg/m2 )- 6/30/2020     She had a repeat PET scan on 7/17/2020 which showed incidental finding of new acute bilateral pulmonary embolism in the distal left main pulmonary artery extending in the left upper and lower lobes and in the segmental and branch arteries in the right lower lobe.    It also showed mildly increased FDG avidity in the lytic lesion in the T9 lamina and right pedicle with SUV max 5.3, previously 3.9.  That is also slightly increased FDG uptake to the left anterior fifth rib at the costochondral junction SUV max 3.9, previously 2.5.  There is slightly decreased FDG uptake in the right femoral neck lesion SUV max 2.2, previously 2.9.  FDG uptake in other bone lesions is fairly stable.  No new metastasis are seen.    CA 27-29 was 308 on 6/30/2020.  It was 286 on 5/19/2020.    Overall this is consistent with only slight progression versus stable disease.    She was started on Lovenox.    Cycle #9 eribulin 7/21/2020. CA 27-29 was 238    C#10 eribulin 8/18/2020. ( delayed by one week for ANC 0.9 )    C#11 Eribulin 9/8/2020    C#12 Eribulin 9/29/2020       I had also recommended starting Zometa so she got dental clearance to start with that.  She received Zometa on 10/23/2019 and 1/14/2020 and 4/7/2020 and 6/30/2020 on a 3 monthly schedule.      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  It was thought that this could be due to Taxol as patient reported to the ophthalmologist that she was on Taxol in September 2019 when her symptoms started.  But she was not on Taxol in September 2019 when she started noticing the visual changes so Taxol is not responsible for this.  The first dose of Taxol was on 11/5/2019.       Interval history.  This is a video visit.   She mentions that she is doing well.  Last Wednesday she had nausea and vomiting and a little bit of diarrhea so she went to the emergency room.  By that time her diarrhea had  resolved but she was still very nauseous so she was given Zofran IV fluids and she was found to have hypokalemia which was repleted.  She felt well right after receiving the fluids and since then she has felt well.  Otherwise chemotherapy has been going well.  She denies any worsening of the pain or neuropathy.  She takes MS Contin and immediate release morphine at night for the back pain and she does not believe that it has significantly changed.  Overall energy is good.  Denies any fevers or infections.  No new swellings.  Her vision overall has significantly improved although lately it has been a stable.      ECOG 1    ROS:  A comprehensive ROS was otherwise neg            I reviewed other history in epic as below.       PAST MEDICAL HISTORY:     1.  Breast cancer  2.  Multiple sclerosis.   3.  Depression.   4.  Migraines.   5.  Hypertension.   6.  Cholecystectomy and umbilical hernia repair.     SOCIAL HISTORY: She smoked for 5 years but quit many years ago.  Drinks alcohol socially.  She lives with her .  She is a teacher in middle school.       FAMILY HISTORY: She mentions that her mother had breast cancer at 49.  Both grandmothers had breast cancer but she is not sure of the age.  A couple of cousins have cancers.  Patient has 3 children who are healthy.    Current Outpatient Medications   Medication     acetaminophen (TYLENOL) 500 MG tablet     apixaban ANTICOAGULANT (ELIQUIS) 5 MG tablet     busPIRone (BUSPAR) 15 MG tablet     calcium citrate-vitamin D (CITRACAL) 315-250 MG-UNIT TABS     Cholecalciferol (VITAMIN D3 PO)     cyclopentolate (CYCLOGYL) 1 % ophthalmic solution     diclofenac (VOLTAREN) 1 % topical gel     ibuprofen (ADVIL/MOTRIN) 600 MG tablet     LORazepam (ATIVAN) 0.5 MG tablet     magnesium 250 MG tablet     morphine (MS CONTIN) 15 MG CR tablet     morphine (MSIR) 15 MG IR tablet     naloxone (NARCAN) 1 mg/mL for intranasal kit (2 syringes with 2 mucosal atomizer device)      "polyethylene glycol (MIRALAX/GLYCOLAX) packet     prochlorperazine (COMPAZINE) 10 MG tablet     propranolol (INDERAL LA) 60 MG 24 hr capsule     senna-docusate (SENOKOT-S/PERICOLACE) 8.6-50 MG tablet     syringe/needle, disp, (B-D INTEGRA SYRINGE) 23G X 1\" 3 ML MISC     traZODone (DESYREL) 50 MG tablet     venlafaxine (EFFEXOR-ER) 225 MG TB24 24 hr tablet     No current facility-administered medications for this visit.      Facility-Administered Medications Ordered in Other Visits   Medication     heparin 100 UNIT/ML injection 5 mL     sodium chloride (PF) 0.9% PF flush 10-20 mL        Allergies   Allergen Reactions     Bactrim [Sulfamethoxazole W/Trimethoprim]      Internal bleeding     Copaxone [Glatiramer] Hives          PHYSICAL EXAMINATION:     /74 (BP Location: Left arm)   Pulse 66   Temp 98.2  F (36.8  C) (Oral)   Resp 16   Wt 59.7 kg (131 lb 11.2 oz)   SpO2 100%   BMI 24.09 kg/m    Wt Readings from Last 4 Encounters:   09/29/20 59.7 kg (131 lb 11.2 oz)   09/24/20 56.8 kg (125 lb 3.2 oz)   09/15/20 60.8 kg (134 lb 1.6 oz)   09/08/20 59.4 kg (130 lb 14.4 oz)       Constitutional.  Looks well and in no apparent distress.   Eyes.  Without eye redness or apparent jaundice.   Respiratory.  Non labored breathing. Speaking in full sentences.    Skin.  No concerning skin rashes on the skin visualized.   Neurological.  Is alert and oriented.  Psychiatric.  Mood and affect seem appropriate.      The rest of a comprehensive physical examination is deferred due to Public Health Emergency video visit restrictions.        Labs and Imaging.    Reviewed    Combined Report of:    PET and CT on  7/17/2020 3:52 PM :     1. PET of the neck, chest, abdomen, and pelvis.  2. PET CT Fusion for Attenuation Correction and Anatomical  Localization:    3. Diagnostic CT scan of the chest, abdomen, and pelvis with  intravenous contrast for interpretation.  3. CT of the chest, abdomen and pelvis obtained for " diagnostic  interpretation.  4. 3D MIP and PET-CT fused images were processed on an independent  workstation and archived to PACS and reviewed by a radiologist.     Technique:     1. PET: The patient received 13.72 mCi of F-18-FDG; the serum glucose  was 97 prior to administration, body weight was 59 kg. Images were  evaluated in the axial, sagittal, and coronal planes as well as the  rotational whole body MIP. Images were acquired from the Vertex to the  Feet.     UPTAKE WAS MEASURED AT 60 MINUTES.      BACKGROUND:  Liver SUV max= 3.5,   Aorta Blood SUV Max: 2.6.      2. CT: Volumetric acquisition for clinical interpretation of the  chest, abdomen, and pelvis acquired at 3 mm sections after the  uneventful administration of intravenous contrast. The chest, abdomen,  and pelvis were evaluated at 5 mm sections in bone, soft tissue, and  lung windows.       The patient received 80 cc. Of Isovue 370 intravenously and 500 mL  oral Breeza contrast for the examination.       3. 3D MIP and PET-CT fused images were processed on an independent  workstation and archived to PACS and reviewed by a radiologist.     INDICATION: Metastatic breast cancer     ADDITIONAL INFORMATION OBTAINED FROM EMR: 60-year-old woman with  history of invasive lobular carcinoma of the right breast diagnosed in  2006 treated with chemoradiation and hormonal therapy. Underwent  bilateral mastectomy 2016. Radiotherapy to the lumbar spine T12-L5 in  September 2019. Currently undergoing chemotherapy with Eribulin.     COMPARISON: MRI thoracic spine 4/3/2020, PET-CT 3/27/2020, MRI  cervical spine 1/31/2020, PET-CT 1/24/2020     FINDINGS:      HEAD/NECK:  There is no suspicious FDG uptake in the neck. Symmetric FDG uptake to  the aryepiglottic folds is favored to be inflammatory in etiology.     Trace mucosal thickening in the right maxillary sinus. The remaining  paranasal sinuses are relatively clear. The mastoid air cells are  clear. No masses, mass  effect or pathologically enlarged lymph nodes  are evident. Small calcifications in or adjacent to the right palatine  tonsil are similar to prior and suggest sequela of prior inflammation,  versus less likely sialoliths. No submandibular ductal ectasia. The  thyroid gland is unremarkable.     CHEST:  There is no suspicious FDG uptake in the chest. Generalized FDG uptake  throughout the esophagus is similar to prior, for example in the the  distal esophagus/small hiatal hernia with SUV max of 5.7 (series 5,  image 202).     The central tracheobronchial tree is patent. No pleural effusion or  pneumothorax. No focal consolidation. Heart size is within normal  limits. No pericardial effusion. Filling defects within the distal  main left pulmonary artery extending into the left lower lobe  segmental and branch arteries, the proximal left upper lobe segmental  arteries, as well as additional filling defects within the right lower  lobe segmental branch arteries. These are new since 3/27/2020, and are  consistent with pulmonary emboli. No enlarged or hypermetabolic  mediastinal, hilar, or axillary lymph nodes. Postsurgical changes of  right axillary lymph node dissection. Post surgical changes of  bilateral mastectomy with implant reconstruction.     ABDOMEN AND PELVIS:  There is no suspicious FDG uptake in the abdomen or pelvis.     The liver, pancreas, spleen, and adrenal glands are unremarkable. The  gallbladder is surgically absent. No intrahepatic or extra hepatic  biliary ductal dilatation. Symmetric nephrographic enhancement. No  hydronephrosis. Bladder and uterus are unremarkable. Normal caliber of  the small and large bowel. No fluid collection or free fluid. No  enlarged or hypermetabolic lymph nodes in the abdomen or pelvis.     LOWER EXTREMITIES:   No abnormal masses or hypermetabolic soft tissue lesions.     BONES:   Increased FDG uptake to the lytic lesion in the T9 lamina and T10  right pedicle, with SUV  max 5.3 (series 5, image 206), previously 3.9.        Increased FDG uptake to the left anterior fifth rib at the  costochondral junction, SUV max 3.9 (series 5, image 211), previously  2.5. Additional chronic bilateral rib fractures with mild FDG uptake  are similar to prior.     Additional scattered lytic lesions in the calvarium, spine, pelvis,  and right femoral neck are similar to prior and demonstrate only the  levels of FDG uptake. For example (series 5):  - Image 110: Left side of C4 vertebral body, SUV max 2.7, previously  2.7.  - Image 328: Right iliac crest SUV max 2.7, previously 2.6.  - Image 341: RIght iliac crest SUV max 2.1, previously 2.2.  - Image 379: Right femoral neck SUV max 2.2, previously 2.9.     Multilevel degenerative changes in the spine. Unchanged severe  compression deformity of the L1 vertebral body with greater than 75%  central height loss. Relative photopenia to the T12-L5 vertebral  bodies on PET images corresponds with history of radiotherapy.                                                                         IMPRESSION: In this patient with a history of metastatic breast cancer  status post chemotherapy, radiotherapy, and bilateral mastectomies:     1. New acute bilateral pulmonary emboli in the distal left main  pulmonary artery extending in the left upper and lower lobes, and in  the segmental and branch arteries in the right lower lobe.     2. Increased FDG uptake to the lytic lesion in the T9-10 posterior  elements concerning for progression of viable tumor.     3. Increased FDG uptake to the anterior left 5th rib at the  costochondral junction, indeterminant. Differential diagnosis includes  degenerative uptake, posttraumatic changes, and progressive  metastasis. Trauma is felt less likely given absence of traumatic  findings on CT, although multiple additional chronic rib fractures are  seen.     4. Additional lytic lesions with low levels of FDG uptake are similar  to  3/27/2020 and most consistent with treated disease.     5. Generalized diffuse FDG uptake to the esophagus and small hiatal  hernia, most consistent with inflammation/esophagitis.      ASSESSMENT AND PLAN:   1.  History of stage IIB, T2 N1 M0 invasive lobular carcinoma of the right breast.  It was initially diagnosed in 2006 and at that time it was hormone receptor positive, HER-2 negative.  She was treated on ECOG 2104 protocol with dose-dense Adriamycin, Cytoxan and Avastin x4 cycles followed by Taxol and Avastin x4 cycles followed by a Avastin for 1 year.  She also received radiation to the right breast which she completed in January 2007 (5040 cGy).  She took Aromasin from 2007 to 2014.    Now she has metastatic breast cancer with extensive involvement of the bones.  There is also a left lower lobe hypermetabolic lesion and mediastinal lymph nodes which are hypermetabolic.  The biopsy from the right iliac bone is consistent with metastatic lobular breast cancer but it is hormone receptor and HER-2 negative and PDL 1 is also negative.    She has received 3000 cGy of palliative radiation to T12-L5 from 9/6/2019 till 9/18/2019.    She was started on palliative Xeloda but she was unable to tolerate even the dose reduced medication.        We decided on repeating scans and MRI brain on 10/25/2019 showed no intracranial metastatic disease.   Multiple enhancing calvarial lesions may be increased in number and are suggestive of metastatic disease. Thinner imaging on the current study may identify the smaller lesions and may be responsible for identified more lesions.   There is a single focus of T2 hyperintense signal within the anterior right temporal lobe may represent interval demyelination. There is no evidence for active demyelination.    PET/CT on 11/1/2019 showed favorable response to therapy and Overall FDG uptake within scattered osseous metastases is decreased since 8/30/2019, particularly within the pelvis.  Increased sclerotic appearance of L1 and L4 pathologic compression deformities, likely sequela of recently completed palliative radiation therapy.    No significant FDG uptake in previously demonstrated hypermetabolic mediastinal lymph nodes. Biopsy negative on 9/5/2019.  There is also decreased FDG uptake in the left lower lobe (biopsy negative for malignancy), now with dense consolidation containing air bronchograms suggestive of infection versus aspiration.  There is diffuse FDG uptake within the esophagus, consistent with esophagitis.    Because of intolerance to Xeloda we decided on switching to weekly paclitaxel on 11/5/2019.    For better tolerance we decreased the dose of paclitaxel to 70 mg/m  with cycle #2.  She has completed 3 cycles of Taxol and the last chemotherapy was on 1/14/2020.      PET/CT on 1/24/2020 showed progression of the disease in some of the bone lesions including increase in size and lytic lesion within the vertebral body of C4 measuring 1 cm as opposed to 0.6 cm previously and a new central soft tissue nodule with SUV max 4.1 when previously it was non-hypermetabolic.  There is also some progression noted in the right proximal femur and right iliac bone.  There is decreased metabolic uptake in the right lamina of T9 with SUV max 4.7 when previously it was 8.0.  Other lesions are stable.    There are no pathologically enlarged mediastinal, hilar or axillary lymph nodes. Calcified subcarinal lymph nodes. There are no suspicious lung nodules or evidence for infection and previous left lower lobe consolidation has resolved.     CA 27-29 also had increased significantly and it was 257 on 1/28/2020.    Due to progression we switched to eribulin on 1/28/2020.        After 3 cycles, she had a repeat PET/CT on 3/27/2020 which showed a stable size of the bone metastatic lesions although the FDG avidity was less, consistent with treatment response.    She had MRI of the thoracic spine on 4/3/2020  and it was compared to the one which was done in August 2019 and it showed increased size of several metastatic lesions most notably at T9 but also at T5-T7.  Other lesions at T10, T11 and T12 appeared improved.    CA 27-29 was also down to 222 on 3/18/2020.    I believe overall this is consistent with a partial response to the treatment.  Overall she is tolerating eribulin well with some worsening of neuropathy and cytopenias.     We decided on continuing the chemotherapy with some modifications and she got cycle #4 with slightly decreased dose of eribulin to 1.2 mg/m2.      After 8 cycles of eribulin repeat PET CT on 7/17/2020 showed only minimally increased FDG uptake in T9 and T10 and in the left anterior fifth rib near the costochondral junction.  That is slightly decreased FDG avidity in the right femoral neck lesion.  Otherwise bone disease is a stable.  No other evidence of metastatic disease is found.    CA 27-29 on 6/30/2020 was 308.    We decided on continuing the same chemotherapy because overall clinical picture was consistent with stable to only minimally progressive disease and she was tolerating treatments well with decent quality of life.    Cycle #10 was delayed by 1 week because ANC was 0.9.  She has received 11 cycles up until now and overall she is tolerating it well.  We will proceed with cycle #12 today.  If she continues to do well and CA 19-9 does not significantly rise, then I will repeat scans after cycle #13.      Recently FDA approved sacituzumab govitecan-hziy (Trodelvy) for TNBC, based on the results of the phase II IMMU-132-01 clinical trial. So this might become an option for her in the future.       Nausea and vomiting.  Currently asymptomatic.  I told her that she should keep a close watch on this and as soon as she starts to develop nausea she should start taking antinausea medications because she usually does not take these regularly as she typically does not get significant  nausea and vomiting.    Hypokalemia.  From the nausea vomiting and diarrhea.  This has resolved and potassium is normal today.      Leukopenia/anemia.  Mild from chemotherapy.  Continue to observe.      Bilateral pulmonary emboli.  These were incidentally found on 7/17/2020.  Continue Eliquis.     Neuropathy.  Overall neuropathy has stabilized after decreasing the dose of eribulin.  She has baseline balance issues and heat and cold sensitivity from multiple sclerosis.       Pain management.  Pain is well controlled on morphine sulfate and immediate release morphine as needed.  She follows with palliative care..  I refilled her morphine today.  Continue to follow with palliative care.       Previously she got palliative radiation to T12-L5 3000 cGy in 10 fractions from 9/6/2019 till 9/18/2019.  She was evaluated by orthopedics Dr. Bipin Elliott on 11/1/2019 and conservative management was recommended.      Bone metastasis.  Continue Zometa every 3 months.  She last received on 6/30/2020.  She will get it today 9/30/2020.  Continue vitamin D and calcium.      We did not address the following today.    Sleeping problems.  I refilled trazodone.    Vision changes.    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  It was thought that this could be due to Taxol as patient reported to the ophthalmologist that she was on Taxol in September 2019 when her symptoms started.  But she was not on Taxol in September 2019 when she started noticing the visual changes so Taxol is not responsible for this.  The first dose of Taxol was on 11/5/2019.   Her vision is much better now.  She will continue to follow closely with ophthalmology.       Increased FDG ability in the colon.  She had FDG uptake in the cecum and ascending colon but no corresponding lesion was seen on the CT scan.  She is completely asymptomatic so at this time we will continue to observe.    Fatigue.  This is from the cancer and  chemotherapy.  I again encouraged her to do regular exercise.  Continue to follow with cancer rehab.        Discussion regarding healthcare directives.  On previous visit she told me that she will bring the health care directive for our records but she forgot so I told her to bring it on next visit.      Breast implant removal.  She tells me that she was planning to do breast implant removal because there was a recall on this.  Her surgery was scheduled for 9/24/2019.  Because of this new development of metastatic breast cancer and her recent radiation, surgery has been canceled.  We discussed that at this time treatment for metastatic breast cancer would take precedence over the other surgery as the other surgery would require her to be off chemotherapy for several weeks and in that case the chances of cancer progression would be high and I would not recommend that.    Multiple sclerosis.  She will continue to follow with her neurologist Dr. Quiles.  Currently multiple sclerosis is under control and currently she is not taking any medications.    See me back in 3 weeks.    I answered all of her questions to her satisfaction and she agrees with this plan.        Dewayne Zepeda MD    Video start time. 10:45 AM  Video stop time. 10:58 AM            Again, thank you for allowing me to participate in the care of your patient.        Sincerely,        Dewayne Zepeda MD

## 2020-09-29 NOTE — PROGRESS NOTES
Patient states she was in the ER on 9/24 and tegaderm with another tape was applied to her port. Noted scabs in corners surrounding port in the shape of a square. Port needle left accessed for treatment. Tolerated port access and draw without complaint. Port site scrubbed with Chloraprep for 30 seconds and allowed to dry completely prior to Opsite dressing application. Accessed using sterile technique. [place] tubes drawn-Red gel/Green/Purple tubes. Double signed by patient and RN. See documentation flowsheet. Mariah Paniagua, RN, BSN, OCN

## 2020-10-06 ENCOUNTER — INFUSION THERAPY VISIT (OUTPATIENT)
Dept: INFUSION THERAPY | Facility: CLINIC | Age: 60
End: 2020-10-06
Payer: COMMERCIAL

## 2020-10-06 ENCOUNTER — ONCOLOGY VISIT (OUTPATIENT)
Dept: ONCOLOGY | Facility: CLINIC | Age: 60
End: 2020-10-06
Payer: COMMERCIAL

## 2020-10-06 ENCOUNTER — MYC REFILL (OUTPATIENT)
Dept: PALLIATIVE CARE | Facility: CLINIC | Age: 60
End: 2020-10-06

## 2020-10-06 VITALS
BODY MASS INDEX: 23.63 KG/M2 | WEIGHT: 129.2 LBS | RESPIRATION RATE: 16 BRPM | OXYGEN SATURATION: 99 % | DIASTOLIC BLOOD PRESSURE: 66 MMHG | TEMPERATURE: 98.1 F | SYSTOLIC BLOOD PRESSURE: 113 MMHG | HEART RATE: 74 BPM

## 2020-10-06 DIAGNOSIS — E87.6 HYPOKALEMIA: Primary | ICD-10-CM

## 2020-10-06 DIAGNOSIS — C50.912 BILATERAL MALIGNANT NEOPLASM OF BREAST IN FEMALE, UNSPECIFIED ESTROGEN RECEPTOR STATUS, UNSPECIFIED SITE OF BREAST (H): ICD-10-CM

## 2020-10-06 DIAGNOSIS — C50.919 METASTATIC BREAST CANCER: ICD-10-CM

## 2020-10-06 DIAGNOSIS — C50.911 BILATERAL MALIGNANT NEOPLASM OF BREAST IN FEMALE, UNSPECIFIED ESTROGEN RECEPTOR STATUS, UNSPECIFIED SITE OF BREAST (H): ICD-10-CM

## 2020-10-06 DIAGNOSIS — G89.3 CANCER ASSOCIATED PAIN: ICD-10-CM

## 2020-10-06 LAB
ALBUMIN SERPL-MCNC: 3.4 G/DL (ref 3.4–5)
ALP SERPL-CCNC: 98 U/L (ref 40–150)
ALT SERPL W P-5'-P-CCNC: 23 U/L (ref 0–50)
ANION GAP SERPL CALCULATED.3IONS-SCNC: 7 MMOL/L (ref 3–14)
AST SERPL W P-5'-P-CCNC: 16 U/L (ref 0–45)
BASOPHILS # BLD AUTO: 0.1 10E9/L (ref 0–0.2)
BASOPHILS NFR BLD AUTO: 1.1 %
BILIRUB SERPL-MCNC: 0.3 MG/DL (ref 0.2–1.3)
BUN SERPL-MCNC: 13 MG/DL (ref 7–30)
CALCIUM SERPL-MCNC: 8.6 MG/DL (ref 8.5–10.1)
CHLORIDE SERPL-SCNC: 102 MMOL/L (ref 94–109)
CO2 SERPL-SCNC: 27 MMOL/L (ref 20–32)
CREAT SERPL-MCNC: 0.53 MG/DL (ref 0.52–1.04)
DIFFERENTIAL METHOD BLD: ABNORMAL
EOSINOPHIL # BLD AUTO: 0 10E9/L (ref 0–0.7)
EOSINOPHIL NFR BLD AUTO: 0.2 %
ERYTHROCYTE [DISTWIDTH] IN BLOOD BY AUTOMATED COUNT: 14.5 % (ref 10–15)
GFR SERPL CREATININE-BSD FRML MDRD: >90 ML/MIN/{1.73_M2}
GLUCOSE SERPL-MCNC: 117 MG/DL (ref 70–99)
HCT VFR BLD AUTO: 37.6 % (ref 35–47)
HGB BLD-MCNC: 12.1 G/DL (ref 11.7–15.7)
IMM GRANULOCYTES # BLD: 0.1 10E9/L (ref 0–0.4)
IMM GRANULOCYTES NFR BLD: 2 %
LYMPHOCYTES # BLD AUTO: 0.7 10E9/L (ref 0.8–5.3)
LYMPHOCYTES NFR BLD AUTO: 15 %
MAGNESIUM SERPL-MCNC: 2.2 MG/DL (ref 1.6–2.3)
MCH RBC QN AUTO: 28.3 PG (ref 26.5–33)
MCHC RBC AUTO-ENTMCNC: 32.2 G/DL (ref 31.5–36.5)
MCV RBC AUTO: 88 FL (ref 78–100)
MONOCYTES # BLD AUTO: 0.2 10E9/L (ref 0–1.3)
MONOCYTES NFR BLD AUTO: 4.3 %
NEUTROPHILS # BLD AUTO: 3.5 10E9/L (ref 1.6–8.3)
NEUTROPHILS NFR BLD AUTO: 77.4 %
PLATELET # BLD AUTO: 216 10E9/L (ref 150–450)
POTASSIUM SERPL-SCNC: 3.6 MMOL/L (ref 3.4–5.3)
PROT SERPL-MCNC: 6.6 G/DL (ref 6.8–8.8)
RBC # BLD AUTO: 4.27 10E12/L (ref 3.8–5.2)
SODIUM SERPL-SCNC: 136 MMOL/L (ref 133–144)
WBC # BLD AUTO: 4.5 10E9/L (ref 4–11)

## 2020-10-06 PROCEDURE — 96367 TX/PROPH/DG ADDL SEQ IV INF: CPT | Performed by: NURSE PRACTITIONER

## 2020-10-06 PROCEDURE — 99207 PR NO CHARGE LOS: CPT

## 2020-10-06 PROCEDURE — 85025 COMPLETE CBC W/AUTO DIFF WBC: CPT | Performed by: INTERNAL MEDICINE

## 2020-10-06 PROCEDURE — 99207 PR NO CHARGE NURSE ONLY: CPT

## 2020-10-06 PROCEDURE — 96409 CHEMO IV PUSH SNGL DRUG: CPT | Performed by: NURSE PRACTITIONER

## 2020-10-06 PROCEDURE — 83735 ASSAY OF MAGNESIUM: CPT | Performed by: INTERNAL MEDICINE

## 2020-10-06 PROCEDURE — 80053 COMPREHEN METABOLIC PANEL: CPT | Performed by: INTERNAL MEDICINE

## 2020-10-06 RX ORDER — HEPARIN SODIUM (PORCINE) LOCK FLUSH IV SOLN 100 UNIT/ML 100 UNIT/ML
5 SOLUTION INTRAVENOUS
Status: DISCONTINUED | OUTPATIENT
Start: 2020-10-06 | End: 2020-10-06 | Stop reason: HOSPADM

## 2020-10-06 RX ORDER — MORPHINE SULFATE 15 MG/1
15 TABLET, FILM COATED, EXTENDED RELEASE ORAL EVERY EVENING
Qty: 30 TABLET | Refills: 0 | Status: SHIPPED | OUTPATIENT
Start: 2020-10-06 | End: 2020-11-30

## 2020-10-06 RX ORDER — MORPHINE SULFATE 15 MG/1
15-30 TABLET ORAL
Qty: 180 TABLET | Refills: 0 | Status: SHIPPED | OUTPATIENT
Start: 2020-10-06 | End: 2020-11-30

## 2020-10-06 RX ADMIN — HEPARIN SODIUM (PORCINE) LOCK FLUSH IV SOLN 100 UNIT/ML 5 ML: 100 SOLUTION at 11:30

## 2020-10-06 RX ADMIN — HEPARIN SODIUM (PORCINE) LOCK FLUSH IV SOLN 100 UNIT/ML 5 ML: 100 SOLUTION at 10:13

## 2020-10-06 RX ADMIN — Medication 250 ML: at 11:01

## 2020-10-06 ASSESSMENT — PAIN SCALES - GENERAL: PAINLEVEL: NO PAIN (0)

## 2020-10-06 NOTE — PROGRESS NOTES
Infusion Nursing Note:  Shaneka Alvarez presents today for C12D8 Eribulin.    Patient seen by provider today: No   present during visit today: Not Applicable.    Note: N/A.    Intravenous Access:  Implanted Port.    Treatment Conditions:  Lab Results   Component Value Date    HGB 12.1 10/06/2020     Lab Results   Component Value Date    WBC 4.5 10/06/2020      Lab Results   Component Value Date    ANEU 3.5 10/06/2020     Lab Results   Component Value Date     10/06/2020      Results reviewed, labs MET treatment parameters, ok to proceed with treatment.    Post Infusion Assessment:  Patient tolerated infusion without incident.  Blood return noted pre and post infusion.  Blood return noted during administration every 5 cc.  Site patent and intact, free from redness, edema or discomfort.  No evidence of extravasations.  Access discontinued per protocol.       Discharge Plan:   Patient will return 10/20/2020 for next appointment.   Patient discharged in stable condition accompanied by: self.  Departure Mode: Ambulatory.    Joanne Ludwig RN-BSN, PHN, OCN  [facility]

## 2020-10-06 NOTE — TELEPHONE ENCOUNTER
Requested medications MS IR   Last refill:MS IR  07/02 MS Contin 09/08  Last office visit: 05/13   Scheduled for follow up 10/26     Patient would like script  eprescribed    [place]  Report reviewed.

## 2020-10-08 ENCOUNTER — TELEPHONE (OUTPATIENT)
Dept: ONCOLOGY | Facility: CLINIC | Age: 60
End: 2020-10-08
Payer: COMMERCIAL

## 2020-10-08 NOTE — TELEPHONE ENCOUNTER
Writer spoke with the patient. She has questions about her treatment plan and why she is only receiving two infusions in each cycle instead of 3 infusion with each cycle.    Patient is concerned and would like to know why this has changed and what is the meaning behind it.    Please call the patient at home. Thank you.    Omayra RING [facility]  Cancer Center   [phone]

## 2020-10-09 ENCOUNTER — PATIENT OUTREACH (OUTPATIENT)
Dept: ONCOLOGY | Facility: CLINIC | Age: 60
End: 2020-10-09

## 2020-10-09 NOTE — PROGRESS NOTES
Writer spoke with patient advised as message noted below - receiving treatment per standard protocol.    Patient was appreciative of call back.

## 2020-10-09 NOTE — PROGRESS NOTES
Call back to patient (mailbox full and no answer)  regarding her question from 10/8 - see note - re:  Questions about treatment plan and why she is only receiving two infusions with each cycle.  Reviewed with Dr. Zepeda.  He advised that she is getting the standard protocol dosing (apart from lowering the dose after a few cycles)with Eribulin on days 1 and 8 of a 21 day treatment cycle.      Writer will attempt to call back.

## 2020-10-12 NOTE — TELEPHONE ENCOUNTER
Patient called in to Clinic. She stated that she did speak with Antonietta last week about the regimen change however is asking if it was supposed to be changed from 3 to 2 infusions per cycle or was it by accident?    Patient seems confused, even after speaking Antonietta.    Patient would like a call back to discuss further, she can be reached at home.    Thank you.    Omayra Enrique  [facility]  Cancer Center   [phone]

## 2020-10-20 ENCOUNTER — VIRTUAL VISIT (OUTPATIENT)
Dept: ONCOLOGY | Facility: CLINIC | Age: 60
End: 2020-10-20
Payer: COMMERCIAL

## 2020-10-20 ENCOUNTER — INFUSION THERAPY VISIT (OUTPATIENT)
Dept: INFUSION THERAPY | Facility: CLINIC | Age: 60
End: 2020-10-20
Payer: COMMERCIAL

## 2020-10-20 ENCOUNTER — ONCOLOGY VISIT (OUTPATIENT)
Dept: ONCOLOGY | Facility: CLINIC | Age: 60
End: 2020-10-20
Payer: COMMERCIAL

## 2020-10-20 VITALS
OXYGEN SATURATION: 98 % | TEMPERATURE: 98.3 F | DIASTOLIC BLOOD PRESSURE: 80 MMHG | WEIGHT: 129.7 LBS | HEART RATE: 89 BPM | SYSTOLIC BLOOD PRESSURE: 114 MMHG | BODY MASS INDEX: 23.72 KG/M2 | RESPIRATION RATE: 14 BRPM

## 2020-10-20 DIAGNOSIS — E87.6 HYPOKALEMIA: ICD-10-CM

## 2020-10-20 DIAGNOSIS — C50.911 BILATERAL MALIGNANT NEOPLASM OF BREAST IN FEMALE, UNSPECIFIED ESTROGEN RECEPTOR STATUS, UNSPECIFIED SITE OF BREAST (H): ICD-10-CM

## 2020-10-20 DIAGNOSIS — C79.51 BONE METASTASES: ICD-10-CM

## 2020-10-20 DIAGNOSIS — E87.6 HYPOKALEMIA: Primary | ICD-10-CM

## 2020-10-20 DIAGNOSIS — C50.919 METASTATIC BREAST CANCER: ICD-10-CM

## 2020-10-20 DIAGNOSIS — C50.919 METASTATIC BREAST CANCER: Primary | ICD-10-CM

## 2020-10-20 DIAGNOSIS — C50.912 BILATERAL MALIGNANT NEOPLASM OF BREAST IN FEMALE, UNSPECIFIED ESTROGEN RECEPTOR STATUS, UNSPECIFIED SITE OF BREAST (H): ICD-10-CM

## 2020-10-20 LAB
ALBUMIN SERPL-MCNC: 3.8 G/DL (ref 3.4–5)
ALP SERPL-CCNC: 91 U/L (ref 40–150)
ALT SERPL W P-5'-P-CCNC: 39 U/L (ref 0–50)
ANION GAP SERPL CALCULATED.3IONS-SCNC: 6 MMOL/L (ref 3–14)
AST SERPL W P-5'-P-CCNC: 35 U/L (ref 0–45)
BASOPHILS # BLD AUTO: 0 10E9/L (ref 0–0.2)
BASOPHILS NFR BLD AUTO: 0.9 %
BILIRUB SERPL-MCNC: 0.5 MG/DL (ref 0.2–1.3)
BUN SERPL-MCNC: 12 MG/DL (ref 7–30)
CALCIUM SERPL-MCNC: 9.4 MG/DL (ref 8.5–10.1)
CANCER AG27-29 SERPL-ACNC: 489 U/ML (ref 0–39)
CHLORIDE SERPL-SCNC: 105 MMOL/L (ref 94–109)
CO2 SERPL-SCNC: 26 MMOL/L (ref 20–32)
CREAT SERPL-MCNC: 0.58 MG/DL (ref 0.52–1.04)
DIFFERENTIAL METHOD BLD: ABNORMAL
EOSINOPHIL # BLD AUTO: 0 10E9/L (ref 0–0.7)
EOSINOPHIL NFR BLD AUTO: 0.3 %
ERYTHROCYTE [DISTWIDTH] IN BLOOD BY AUTOMATED COUNT: 14.6 % (ref 10–15)
GFR SERPL CREATININE-BSD FRML MDRD: >90 ML/MIN/{1.73_M2}
GLUCOSE SERPL-MCNC: 130 MG/DL (ref 70–99)
HCT VFR BLD AUTO: 42.3 % (ref 35–47)
HGB BLD-MCNC: 13.7 G/DL (ref 11.7–15.7)
IMM GRANULOCYTES # BLD: 0 10E9/L (ref 0–0.4)
IMM GRANULOCYTES NFR BLD: 0.9 %
LYMPHOCYTES # BLD AUTO: 0.6 10E9/L (ref 0.8–5.3)
LYMPHOCYTES NFR BLD AUTO: 17.3 %
MAGNESIUM SERPL-MCNC: 2.1 MG/DL (ref 1.6–2.3)
MCH RBC QN AUTO: 28.1 PG (ref 26.5–33)
MCHC RBC AUTO-ENTMCNC: 32.4 G/DL (ref 31.5–36.5)
MCV RBC AUTO: 87 FL (ref 78–100)
MONOCYTES # BLD AUTO: 0.7 10E9/L (ref 0–1.3)
MONOCYTES NFR BLD AUTO: 19 %
NEUTROPHILS # BLD AUTO: 2.2 10E9/L (ref 1.6–8.3)
NEUTROPHILS NFR BLD AUTO: 61.6 %
PLATELET # BLD AUTO: 227 10E9/L (ref 150–450)
POTASSIUM SERPL-SCNC: 4 MMOL/L (ref 3.4–5.3)
PROT SERPL-MCNC: 7.3 G/DL (ref 6.8–8.8)
RBC # BLD AUTO: 4.87 10E12/L (ref 3.8–5.2)
SODIUM SERPL-SCNC: 137 MMOL/L (ref 133–144)
WBC # BLD AUTO: 3.5 10E9/L (ref 4–11)

## 2020-10-20 PROCEDURE — 86300 IMMUNOASSAY TUMOR CA 15-3: CPT | Performed by: INTERNAL MEDICINE

## 2020-10-20 PROCEDURE — 96367 TX/PROPH/DG ADDL SEQ IV INF: CPT | Performed by: INTERNAL MEDICINE

## 2020-10-20 PROCEDURE — 80053 COMPREHEN METABOLIC PANEL: CPT | Performed by: INTERNAL MEDICINE

## 2020-10-20 PROCEDURE — 83735 ASSAY OF MAGNESIUM: CPT | Performed by: INTERNAL MEDICINE

## 2020-10-20 PROCEDURE — 85025 COMPLETE CBC W/AUTO DIFF WBC: CPT | Performed by: INTERNAL MEDICINE

## 2020-10-20 PROCEDURE — 99207 PR NO CHARGE LOS: CPT

## 2020-10-20 PROCEDURE — 99207 PR NO CHARGE NURSE ONLY: CPT

## 2020-10-20 PROCEDURE — 96409 CHEMO IV PUSH SNGL DRUG: CPT | Performed by: INTERNAL MEDICINE

## 2020-10-20 PROCEDURE — 99214 OFFICE O/P EST MOD 30 MIN: CPT | Mod: GT | Performed by: INTERNAL MEDICINE

## 2020-10-20 RX ORDER — HEPARIN SODIUM (PORCINE) LOCK FLUSH IV SOLN 100 UNIT/ML 100 UNIT/ML
5 SOLUTION INTRAVENOUS
Status: CANCELLED | OUTPATIENT
Start: 2020-10-27

## 2020-10-20 RX ORDER — METHYLPREDNISOLONE SODIUM SUCCINATE 125 MG/2ML
125 INJECTION, POWDER, LYOPHILIZED, FOR SOLUTION INTRAMUSCULAR; INTRAVENOUS
Status: CANCELLED
Start: 2020-10-20

## 2020-10-20 RX ORDER — HEPARIN SODIUM (PORCINE) LOCK FLUSH IV SOLN 100 UNIT/ML 100 UNIT/ML
5 SOLUTION INTRAVENOUS
Status: CANCELLED | OUTPATIENT
Start: 2020-10-20

## 2020-10-20 RX ORDER — DIPHENHYDRAMINE HYDROCHLORIDE 50 MG/ML
50 INJECTION INTRAMUSCULAR; INTRAVENOUS
Status: CANCELLED
Start: 2020-10-27

## 2020-10-20 RX ORDER — DIPHENHYDRAMINE HYDROCHLORIDE 50 MG/ML
50 INJECTION INTRAMUSCULAR; INTRAVENOUS
Status: CANCELLED
Start: 2020-10-20

## 2020-10-20 RX ORDER — METHYLPREDNISOLONE SODIUM SUCCINATE 125 MG/2ML
125 INJECTION, POWDER, LYOPHILIZED, FOR SOLUTION INTRAMUSCULAR; INTRAVENOUS
Status: CANCELLED
Start: 2020-10-27

## 2020-10-20 RX ORDER — MEPERIDINE HYDROCHLORIDE 25 MG/ML
25 INJECTION INTRAMUSCULAR; INTRAVENOUS; SUBCUTANEOUS EVERY 30 MIN PRN
Status: CANCELLED | OUTPATIENT
Start: 2020-10-20

## 2020-10-20 RX ORDER — HEPARIN SODIUM,PORCINE 10 UNIT/ML
5 VIAL (ML) INTRAVENOUS
Status: CANCELLED | OUTPATIENT
Start: 2020-10-20

## 2020-10-20 RX ORDER — ALBUTEROL SULFATE 0.83 MG/ML
2.5 SOLUTION RESPIRATORY (INHALATION)
Status: CANCELLED | OUTPATIENT
Start: 2020-10-20

## 2020-10-20 RX ORDER — NALOXONE HYDROCHLORIDE 0.4 MG/ML
.1-.4 INJECTION, SOLUTION INTRAMUSCULAR; INTRAVENOUS; SUBCUTANEOUS
Status: CANCELLED | OUTPATIENT
Start: 2020-10-20

## 2020-10-20 RX ORDER — LORAZEPAM 2 MG/ML
0.5 INJECTION INTRAMUSCULAR EVERY 4 HOURS PRN
Status: CANCELLED
Start: 2020-10-20

## 2020-10-20 RX ORDER — EPINEPHRINE 0.3 MG/.3ML
0.3 INJECTION SUBCUTANEOUS EVERY 5 MIN PRN
Status: CANCELLED | OUTPATIENT
Start: 2020-10-20

## 2020-10-20 RX ORDER — ALBUTEROL SULFATE 90 UG/1
1-2 AEROSOL, METERED RESPIRATORY (INHALATION)
Status: CANCELLED
Start: 2020-10-27

## 2020-10-20 RX ORDER — LORAZEPAM 2 MG/ML
0.5 INJECTION INTRAMUSCULAR EVERY 4 HOURS PRN
Status: CANCELLED
Start: 2020-10-27

## 2020-10-20 RX ORDER — HEPARIN SODIUM (PORCINE) LOCK FLUSH IV SOLN 100 UNIT/ML 100 UNIT/ML
5 SOLUTION INTRAVENOUS
Status: DISCONTINUED | OUTPATIENT
Start: 2020-10-20 | End: 2020-10-20 | Stop reason: HOSPADM

## 2020-10-20 RX ORDER — SODIUM CHLORIDE 9 MG/ML
1000 INJECTION, SOLUTION INTRAVENOUS CONTINUOUS PRN
Status: CANCELLED
Start: 2020-10-27

## 2020-10-20 RX ORDER — ALBUTEROL SULFATE 90 UG/1
1-2 AEROSOL, METERED RESPIRATORY (INHALATION)
Status: CANCELLED
Start: 2020-10-20

## 2020-10-20 RX ORDER — NALOXONE HYDROCHLORIDE 0.4 MG/ML
.1-.4 INJECTION, SOLUTION INTRAMUSCULAR; INTRAVENOUS; SUBCUTANEOUS
Status: CANCELLED | OUTPATIENT
Start: 2020-10-27

## 2020-10-20 RX ORDER — MEPERIDINE HYDROCHLORIDE 25 MG/ML
25 INJECTION INTRAMUSCULAR; INTRAVENOUS; SUBCUTANEOUS EVERY 30 MIN PRN
Status: CANCELLED | OUTPATIENT
Start: 2020-10-27

## 2020-10-20 RX ORDER — EPINEPHRINE 1 MG/ML
0.3 INJECTION, SOLUTION INTRAMUSCULAR; SUBCUTANEOUS EVERY 5 MIN PRN
Status: CANCELLED | OUTPATIENT
Start: 2020-10-20

## 2020-10-20 RX ORDER — EPINEPHRINE 1 MG/ML
0.3 INJECTION, SOLUTION INTRAMUSCULAR; SUBCUTANEOUS EVERY 5 MIN PRN
Status: CANCELLED | OUTPATIENT
Start: 2020-10-27

## 2020-10-20 RX ORDER — EPINEPHRINE 0.3 MG/.3ML
0.3 INJECTION SUBCUTANEOUS EVERY 5 MIN PRN
Status: CANCELLED | OUTPATIENT
Start: 2020-10-27

## 2020-10-20 RX ORDER — SODIUM CHLORIDE 9 MG/ML
1000 INJECTION, SOLUTION INTRAVENOUS CONTINUOUS PRN
Status: CANCELLED
Start: 2020-10-20

## 2020-10-20 RX ORDER — HEPARIN SODIUM,PORCINE 10 UNIT/ML
5 VIAL (ML) INTRAVENOUS
Status: CANCELLED | OUTPATIENT
Start: 2020-10-27

## 2020-10-20 RX ORDER — ALBUTEROL SULFATE 0.83 MG/ML
2.5 SOLUTION RESPIRATORY (INHALATION)
Status: CANCELLED | OUTPATIENT
Start: 2020-10-27

## 2020-10-20 RX ADMIN — Medication 250 ML: at 12:07

## 2020-10-20 RX ADMIN — HEPARIN SODIUM (PORCINE) LOCK FLUSH IV SOLN 100 UNIT/ML 5 ML: 100 SOLUTION at 13:04

## 2020-10-20 RX ADMIN — HEPARIN SODIUM (PORCINE) LOCK FLUSH IV SOLN 100 UNIT/ML 5 ML: 100 SOLUTION at 11:36

## 2020-10-20 ASSESSMENT — PAIN SCALES - GENERAL: PAINLEVEL: NO PAIN (0)

## 2020-10-20 NOTE — NURSING NOTE
"Shaneka Alvarez is a 60 year old female who is being evaluated via a billable video visit.      The patient has been notified of following:     \"This video visit will be conducted via a call between you and your physician/provider. We have found that certain health care needs can be provided without the need for an in-person physical exam.  This service lets us provide the care you need with a video conversation.  If a prescription is necessary we can send it directly to your pharmacy.  If lab work is needed we can place an order for that and you can then stop by our lab to have the test done at a later time.    Video visits are billed at different rates depending on your insurance coverage.  Please reach out to your insurance provider with any questions.    If during the course of the call the physician/provider feels a video visit is not appropriate, you will not be charged for this service.\"    Patient has given verbal consent for Video visit? Yes  How would you like to obtain your AVS? MyChart  If you are dropped from the video visit, the video invite should be resent to: Text to cell phone: [phone]  Will anyone else be joining your video visit? No      Video-Visit Details    Type of service:  Video Visit    Originating Location (pt. Location): Home    Distant Location (provider location):  [facility]     Platform used for Video Visit: Imtiaz Thakur CMA        "

## 2020-10-20 NOTE — PATIENT INSTRUCTIONS
Chemo today IF labs are ok    Schedule PET scan around 11/6/2020    See me back 11/10/2020 and chemo to follow

## 2020-10-20 NOTE — PROGRESS NOTES
Port needle-one inch- left accessed for treatment. Tolerated port access and draw without complaint. Port site scrubbed with Chloraprep for 30 seconds and allowed to dry completely prior to Opsite dressing application. Accessed using sterile technique. [place] tubes drawn-Red gel/Green/Purple tubes. Double signed by patient and RN. See documentation flowsheet. Mariah Paniagua, RN, BSN, OCN

## 2020-10-20 NOTE — PROGRESS NOTES
Infusion Nursing Note:  Shaneka Alvarez presents today for C13D1 Eribulin.    Patient seen by provider today: Yes: Dr Zepeda   present during visit today: Not Applicable.    Note: N/A.    Intravenous Access:  Implanted Port.    Treatment Conditions:  Lab Results   Component Value Date    HGB 13.7 10/20/2020     Lab Results   Component Value Date    WBC 3.5 10/20/2020      Lab Results   Component Value Date    ANEU 2.2 10/20/2020     Lab Results   Component Value Date     10/20/2020      Results reviewed, labs MET treatment parameters, ok to proceed with treatment.      Post Infusion Assessment:  Patient tolerated infusion without incident.  Blood return noted pre and post infusion.  Site patent and intact, free from redness, edema or discomfort.  No evidence of extravasations.  Access discontinued per protocol.       Discharge Plan:   [facility]  Patient will return 10/27/2020 for next appointment.   Patient discharged in stable condition accompanied by: self.  Departure Mode: Ambulatory.    Lauren Shaw RN

## 2020-10-20 NOTE — PROGRESS NOTES
SPIRITUAL HEALTH SERVICES  SPIRITUAL ASSESSMENT Progress Note  [facility]       REFERRAL SOURCE: Follow up spiritual health encounter.     Visited with Shaneka Alvarez  in the [facility] center for ongoing emotional/spiritual support.  She shared that her entire family had enjoyed a vacation at a cabin together recently. She is also planning a visit to her daughters in [place] during the holidays.  She feels that she has been tolerating the treatments well.  No concerns or distress discussed or noted.     PLAN:  The patient  knows that she can request a Spiritual Health encounter as needed and she appreciates the visits so I will check in with her periodically for support as well.     iGna Pineda  Staff

## 2020-10-20 NOTE — LETTER
10/20/2020         RE: Shaneka Alvarez  [id number] [facility] [id number]        Dear Colleague,    Thank you for referring your patient, Shaneka Alvarez, to the [facility]. Please see a copy of my visit note below.      ONCOLOGY FOLLOW UP NOTE: 10/20/2020        I took the history from reviewing the previous notes that she was following with Dr. Amaya.  I have copied and updated from prior notes.    ONCOLOGY History:    1.  January 2006:  Diagnosed with Stage IIB, T2 N1 M0 invasive lobular carcinoma of the right breast.  Final pathology showed a 4.5 x 3.8 x 2.5 cm, 01/14 lymph nodes positive.  Estrogen, progesterone receptor positive, HER-2 negative.     2.  Genetics.  BRCA1 and 2 mutations not detected. 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       THERAPY TO DATE:   1. 2006:  ECOG 2104 protocol of dose-dense Adriamycin, Cytoxan and Avastin x4 cycles followed by Taxol and Avastin x4 cycles.   2.  03/2007:  Completed 1 year Avastin.   3.  11/2006-01/2007:  Radiotherapy to the right breast of 5040 cGy.   4.  03/[id number]:  Aromasin.  Stopped after moving to the [facility].   5.  01/2016:  Right modified radical mastectomy with latissimus dorsi flap reconstruction and a left prophylactic mastectomy with latissimus dorsi flap reconstruction.     She recently had MRI of the brain as a follow-up for multiple sclerosis and there were multiple calvarial lesions noted which was suspicious for metastatic disease.  There was no evidence of new multiple sclerosis lesions.  She had a PET scan on 8/30/2019 which showed widespread bone metastatic lesions (calvarium, spine, sacrum, pelvis, ribs most prominent at C7, T3, L1 and L4), hypermetabolic 3 cm lesion in LLL, and mediastinal/hilar LN. CEA wnl at 1.9 and C27-29 elevated to 415 (28 on 8/23/16). A CT guided biopsy of the right iliac bone was obtained on 9/4/19, pathology consistent with metastatic adenocarcinoma  (breast), ER/UT negative, HER-2 negative PDL 1 < 1%. A second biopsy was take of the LLL nodule via EBUS on 9/5/19 did not show any malignancy.    C/T/L spine MRI 8/3/19 to 9/2/19 with numerous enhancing lesions of the C, T and L spine, largest around T9 and L1. No evidence of cord compression. Has a L1 compression fracture and impending L4.     Received radiation T12-L5 3000 cGy in 10 fractions from 9/6/2019 till 9/18/2019.  Neurosurgery recommended no surgical intervention but to wear Gorge brace when out of bed and HOB >30 degrees    She started palliative Xeloda 2000/1500 on 10/1/2019 but after just a few doses she noticed nausea, red eyes blurred vision, loss of appetite.  She stopped taking it after 5 doses and was seen by nurse practitioner on 10/7/2019 when she was improving.  At that point she was restarted on Xeloda at a lower dose of 1000 mg twice a day.  As she was not able to tolerate even the lower dose with extreme nausea and vomiting and feeling extremely fatigued and blurry vision, we decided on stopping Xeloda.    We decided on repeating scans and MRI brain on 10/25/2019 showed no intracranial metastatic disease.   Multiple enhancing calvarial lesions may be increased in number and are suggestive of metastatic disease. Thinner imaging on the current study may identify the smaller lesions and may be responsible for  identified more lesions.   There is a single focus of T2 hyperintense signal within the anterior right temporal lobe may represent interval demyelination. There is no evidence for active demyelination.    PET/CT on 11/1/2019 showed favorable response to therapy and Overall FDG uptake within scattered osseous metastases is decreased since 8/30/2019, particularly within the pelvis. Increased sclerotic appearance of L1 and L4 pathologic compression deformities, likely sequela of recently completed palliative radiation therapy.    No significant FDG uptake in previously demonstrated  hypermetabolic mediastinal lymph nodes. Biopsy negative on 9/5/2019.  There is also decreased FDG uptake in the left lower lobe (biopsy negative for  malignancy), now with dense consolidation containing air bronchograms suggestive of infection versus aspiration.  There is diffuse FDG uptake within the esophagus, consistent with esophagitis.    Because of intolerance to Xeloda we decided on switching to weekly paclitaxel on 11/5/2019.    C#2 Taxol 12/4/19- started with 70mg/m2 dose reduction    C#3 Taxol 12/30/2019     PET/CT on 1/24/2020 showed progression of the disease in some of the bone lesions including increase in size and lytic lesion within the vertebral body of C4 measuring 1 cm as opposed to 0.6 cm previously and a new central soft tissue nodule with SUV max 4.1 when previously it was non-hypermetabolic.  There is also some progression noted in the right proximal femur and right iliac bone.  There is decreased metabolic uptake in the right lamina of T9 with SUV max 4.7 when previously it was 8.0.  Other lesions are stable.    CA 27-29 also had increased significantly and it was 257 on 1/28/2020.    We decided on switching to eribulin on 1/28/2020.    C#2 2/18/2020  C#2 D#8 2/25/2020    C#3 D#1 3/18/2020 -delayed by 1 week as per patient's preference because she wanted to visit her family.    She had a repeat PET/CT on 3/27/2020 which showed stable size of the bone metastatic lesions although the FDG avidity was less, consistent with treatment response.    She had MRI of the thoracic spine on 4/3/2020 and it was compared to the one which was done in August 2019 and it showed increased size of several metastatic lesions most notably at T9 but also at T5-T7.  Other lesions at T10, T11 and T12 appeared improved.    CA 27-29 was also down to 222 on 3/18/2020.    Cycle #4 eribulin ( dose reduced to 1.2 mg/m2 )-4/7/2020-   Cycle #5 Eribulin ( 1.2 mg/m2 )- 4/28/2020   Cycle #6 Eribulin ( 1.2 mg/m2 )- 5/19/2020      Cycle #7 Eribulin ( 1.2 mg/m2 )- 6/9/2020    Cycle #8 Eribulin ( 1.2 mg/m2 )- 6/30/2020     She had a repeat PET scan on 7/17/2020 which showed incidental finding of new acute bilateral pulmonary embolism in the distal left main pulmonary artery extending in the left upper and lower lobes and in the segmental and branch arteries in the right lower lobe.    It also showed mildly increased FDG avidity in the lytic lesion in the T9 lamina and right pedicle with SUV max 5.3, previously 3.9.  That is also slightly increased FDG uptake to the left anterior fifth rib at the costochondral junction SUV max 3.9, previously 2.5.  There is slightly decreased FDG uptake in the right femoral neck lesion SUV max 2.2, previously 2.9.  FDG uptake in other bone lesions is fairly stable.  No new metastasis are seen.    CA 27-29 was 308 on 6/30/2020.  It was 286 on 5/19/2020.    Overall this is consistent with only slight progression versus stable disease.    She was started on Lovenox.    Cycle #9 eribulin 7/21/2020. CA 27-29 was 238    C#10 eribulin 8/18/2020. ( delayed by one week for ANC 0.9 )    C#11 Eribulin 9/8/2020    C#12 Eribulin 9/29/2020     C#13 Eribulin 10/20/2020 ( planned )      I had also recommended starting Zometa so she got dental clearance to start with that.  She received Zometa on 10/23/2019 and 1/14/2020 and 4/7/2020 and 6/30/2020 and 9/29/2020 on a 3 monthly schedule.      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  It was thought that this could be due to Taxol as patient reported to the ophthalmologist that she was on Taxol in September 2019 when her symptoms started.  But she was not on Taxol in September 2019 when she started noticing the visual changes so Taxol is not responsible for this.  The first dose of Taxol was on 11/5/2019.       Interval history.  This is a video visit.   Last chemotherapy went very well and she did not have any significant nausea or  vomiting.  She was able to eat and drink well and denies any weight loss.  No diarrhea.  She continues to have issues with back pain and she mentions that usually the mornings are good but by the nighttime she has more pain so she takes MS Contin and immediate release morphine at night.  She has not increased the usage of pain medications.  Neuropathy is very mild and stable.  No new swellings.  Overall energy is good.  Denies fevers infections or shortness of breath.        ECOG 1    ROS:  Rest of the comprehensive review of the system was essentially unremarkable.      I reviewed other history in epic as below.       PAST MEDICAL HISTORY:     1.  Breast cancer  2.  Multiple sclerosis.   3.  Depression.   4.  Migraines.   5.  Hypertension.   6.  Cholecystectomy and umbilical hernia repair.     SOCIAL HISTORY: She smoked for 5 years but quit many years ago.  Drinks alcohol socially.  She lives with her .  She is a teacher in middle school.       FAMILY HISTORY: She mentions that her mother had breast cancer at 49.  Both grandmothers had breast cancer but she is not sure of the age.  A couple of cousins have cancers.  Patient has 3 children who are healthy.    Current Outpatient Medications   Medication     acetaminophen (TYLENOL) 500 MG tablet     apixaban ANTICOAGULANT (ELIQUIS) 5 MG tablet     busPIRone (BUSPAR) 15 MG tablet     calcium citrate-vitamin D (CITRACAL) 315-250 MG-UNIT TABS     Cholecalciferol (VITAMIN D3 PO)     cyclopentolate (CYCLOGYL) 1 % ophthalmic solution     diclofenac (VOLTAREN) 1 % topical gel     ibuprofen (ADVIL/MOTRIN) 600 MG tablet     LORazepam (ATIVAN) 0.5 MG tablet     magnesium 250 MG tablet     morphine (MS CONTIN) 15 MG CR tablet     morphine (MSIR) 15 MG IR tablet     naloxone (NARCAN) 1 mg/mL for intranasal kit (2 syringes with 2 mucosal atomizer device)     polyethylene glycol (MIRALAX/GLYCOLAX) packet     prochlorperazine (COMPAZINE) 10 MG tablet     propranolol (INDERAL  "LA) 60 MG 24 hr capsule     senna-docusate (SENOKOT-S/PERICOLACE) 8.6-50 MG tablet     syringe/needle, disp, (B-D INTEGRA SYRINGE) 23G X 1\" 3 ML MISC     traZODone (DESYREL) 50 MG tablet     venlafaxine (EFFEXOR-ER) 225 MG TB24 24 hr tablet     No current facility-administered medications for this visit.      Facility-Administered Medications Ordered in Other Visits   Medication     heparin 100 UNIT/ML injection 5 mL     sodium chloride (PF) 0.9% PF flush 10-20 mL        Allergies   Allergen Reactions     Bactrim [Sulfamethoxazole W/Trimethoprim]      Internal bleeding     Copaxone [Glatiramer] Hives          PHYSICAL EXAMINATION:     There were no vitals taken for this visit.  Wt Readings from Last 4 Encounters:   10/06/20 58.6 kg (129 lb 3.2 oz)   09/29/20 59.7 kg (131 lb 11.2 oz)   09/24/20 56.8 kg (125 lb 3.2 oz)   09/15/20 60.8 kg (134 lb 1.6 oz)         Constitutional.  Does not seem to be in any acute distress.  Eyes.  No redness or discharge noted.  Respiratory.  Speaking in full sentences.  Breathing seems comfortable without any accessory use of muscles.    Skin.  Visualized his skin does not show any obvious rashes.  Musculoskeletal.  Range of motion for visualized areas is intact.  Neurological.  Alert and oriented x3.  Psychiatric.  Mood, mentation and affect are normal.  Decision making capacity is intact.      The rest of a comprehensive physical examination is deferred due to Public Health Emergency video visit restrictions.          Labs and Imaging.    Previous labs reviewed.  They are pending from today.    Combined Report of:    PET and CT on  7/17/2020 3:52 PM :     1. PET of the neck, chest, abdomen, and pelvis.  2. PET CT Fusion for Attenuation Correction and Anatomical  Localization:    3. Diagnostic CT scan of the chest, abdomen, and pelvis with  intravenous contrast for interpretation.  3. CT of the chest, abdomen and pelvis obtained for diagnostic  interpretation.  4. 3D MIP and PET-CT " fused images were processed on an independent  workstation and archived to PACS and reviewed by a radiologist.     Technique:     1. PET: The patient received 13.72 mCi of F-18-FDG; the serum glucose  was 97 prior to administration, body weight was 59 kg. Images were  evaluated in the axial, sagittal, and coronal planes as well as the  rotational whole body MIP. Images were acquired from the Vertex to the  Feet.     UPTAKE WAS MEASURED AT 60 MINUTES.      BACKGROUND:  Liver SUV max= 3.5,   Aorta Blood SUV Max: 2.6.      2. CT: Volumetric acquisition for clinical interpretation of the  chest, abdomen, and pelvis acquired at 3 mm sections after the  uneventful administration of intravenous contrast. The chest, abdomen,  and pelvis were evaluated at 5 mm sections in bone, soft tissue, and  lung windows.       The patient received 80 cc. Of Isovue 370 intravenously and 500 mL  oral Breeza contrast for the examination.       3. 3D MIP and PET-CT fused images were processed on an independent  workstation and archived to PACS and reviewed by a radiologist.     INDICATION: Metastatic breast cancer     ADDITIONAL INFORMATION OBTAINED FROM EMR: 60-year-old woman with  history of invasive lobular carcinoma of the right breast diagnosed in  2006 treated with chemoradiation and hormonal therapy. Underwent  bilateral mastectomy 2016. Radiotherapy to the lumbar spine T12-L5 in  September 2019. Currently undergoing chemotherapy with Eribulin.     COMPARISON: MRI thoracic spine 4/3/2020, PET-CT 3/27/2020, MRI  cervical spine 1/31/2020, PET-CT 1/24/2020     FINDINGS:      HEAD/NECK:  There is no suspicious FDG uptake in the neck. Symmetric FDG uptake to  the aryepiglottic folds is favored to be inflammatory in etiology.     Trace mucosal thickening in the right maxillary sinus. The remaining  paranasal sinuses are relatively clear. The mastoid air cells are  clear. No masses, mass effect or pathologically enlarged lymph nodes  are  evident. Small calcifications in or adjacent to the right palatine  tonsil are similar to prior and suggest sequela of prior inflammation,  versus less likely sialoliths. No submandibular ductal ectasia. The  thyroid gland is unremarkable.     CHEST:  There is no suspicious FDG uptake in the chest. Generalized FDG uptake  throughout the esophagus is similar to prior, for example in the the  distal esophagus/small hiatal hernia with SUV max of 5.7 (series 5,  image 202).     The central tracheobronchial tree is patent. No pleural effusion or  pneumothorax. No focal consolidation. Heart size is within normal  limits. No pericardial effusion. Filling defects within the distal  main left pulmonary artery extending into the left lower lobe  segmental and branch arteries, the proximal left upper lobe segmental  arteries, as well as additional filling defects within the right lower  lobe segmental branch arteries. These are new since 3/27/2020, and are  consistent with pulmonary emboli. No enlarged or hypermetabolic  mediastinal, hilar, or axillary lymph nodes. Postsurgical changes of  right axillary lymph node dissection. Post surgical changes of  bilateral mastectomy with implant reconstruction.     ABDOMEN AND PELVIS:  There is no suspicious FDG uptake in the abdomen or pelvis.     The liver, pancreas, spleen, and adrenal glands are unremarkable. The  gallbladder is surgically absent. No intrahepatic or extra hepatic  biliary ductal dilatation. Symmetric nephrographic enhancement. No  hydronephrosis. Bladder and uterus are unremarkable. Normal caliber of  the small and large bowel. No fluid collection or free fluid. No  enlarged or hypermetabolic lymph nodes in the abdomen or pelvis.     LOWER EXTREMITIES:   No abnormal masses or hypermetabolic soft tissue lesions.     BONES:   Increased FDG uptake to the lytic lesion in the T9 lamina and T10  right pedicle, with SUV max 5.3 (series 5, image 206), previously  3.9.        Increased FDG uptake to the left anterior fifth rib at the  costochondral junction, SUV max 3.9 (series 5, image 211), previously  2.5. Additional chronic bilateral rib fractures with mild FDG uptake  are similar to prior.     Additional scattered lytic lesions in the calvarium, spine, pelvis,  and right femoral neck are similar to prior and demonstrate only the  levels of FDG uptake. For example (series 5):  - Image 110: Left side of C4 vertebral body, SUV max 2.7, previously  2.7.  - Image 328: Right iliac crest SUV max 2.7, previously 2.6.  - Image 341: RIght iliac crest SUV max 2.1, previously 2.2.  - Image 379: Right femoral neck SUV max 2.2, previously 2.9.     Multilevel degenerative changes in the spine. Unchanged severe  compression deformity of the L1 vertebral body with greater than 75%  central height loss. Relative photopenia to the T12-L5 vertebral  bodies on PET images corresponds with history of radiotherapy.                                                                         IMPRESSION: In this patient with a history of metastatic breast cancer  status post chemotherapy, radiotherapy, and bilateral mastectomies:     1. New acute bilateral pulmonary emboli in the distal left main  pulmonary artery extending in the left upper and lower lobes, and in  the segmental and branch arteries in the right lower lobe.     2. Increased FDG uptake to the lytic lesion in the T9-10 posterior  elements concerning for progression of viable tumor.     3. Increased FDG uptake to the anterior left 5th rib at the  costochondral junction, indeterminant. Differential diagnosis includes  degenerative uptake, posttraumatic changes, and progressive  metastasis. Trauma is felt less likely given absence of traumatic  findings on CT, although multiple additional chronic rib fractures are  seen.     4. Additional lytic lesions with low levels of FDG uptake are similar  to 3/27/2020 and most consistent with treated  disease.     5. Generalized diffuse FDG uptake to the esophagus and small hiatal  hernia, most consistent with inflammation/esophagitis.      ASSESSMENT AND PLAN:   1.  History of stage IIB, T2 N1 M0 invasive lobular carcinoma of the right breast.  It was initially diagnosed in 2006 and at that time it was hormone receptor positive, HER-2 negative.  She was treated on ECOG 2104 protocol with dose-dense Adriamycin, Cytoxan and Avastin x4 cycles followed by Taxol and Avastin x4 cycles followed by a Avastin for 1 year.  She also received radiation to the right breast which she completed in January 2007 (5040 cGy).  She took Aromasin from 2007 to 2014.    Now she has metastatic breast cancer with extensive involvement of the bones.  There is also a left lower lobe hypermetabolic lesion and mediastinal lymph nodes which are hypermetabolic.  The biopsy from the right iliac bone is consistent with metastatic lobular breast cancer but it is hormone receptor and HER-2 negative and PDL 1 is also negative.    She has received 3000 cGy of palliative radiation to T12-L5 from 9/6/2019 till 9/18/2019.    She was started on palliative Xeloda but she was unable to tolerate even the dose reduced medication.        We decided on repeating scans and MRI brain on 10/25/2019 showed no intracranial metastatic disease.   Multiple enhancing calvarial lesions may be increased in number and are suggestive of metastatic disease. Thinner imaging on the current study may identify the smaller lesions and may be responsible for identified more lesions.   There is a single focus of T2 hyperintense signal within the anterior right temporal lobe may represent interval demyelination. There is no evidence for active demyelination.    PET/CT on 11/1/2019 showed favorable response to therapy and Overall FDG uptake within scattered osseous metastases is decreased since 8/30/2019, particularly within the pelvis. Increased sclerotic appearance of L1 and L4  pathologic compression deformities, likely sequela of recently completed palliative radiation therapy.    No significant FDG uptake in previously demonstrated hypermetabolic mediastinal lymph nodes. Biopsy negative on 9/5/2019.  There is also decreased FDG uptake in the left lower lobe (biopsy negative for malignancy), now with dense consolidation containing air bronchograms suggestive of infection versus aspiration.  There is diffuse FDG uptake within the esophagus, consistent with esophagitis.    Because of intolerance to Xeloda we decided on switching to weekly paclitaxel on 11/5/2019.    For better tolerance we decreased the dose of paclitaxel to 70 mg/m  with cycle #2.  She has completed 3 cycles of Taxol and the last chemotherapy was on 1/14/2020.      PET/CT on 1/24/2020 showed progression of the disease in some of the bone lesions including increase in size and lytic lesion within the vertebral body of C4 measuring 1 cm as opposed to 0.6 cm previously and a new central soft tissue nodule with SUV max 4.1 when previously it was non-hypermetabolic.  There is also some progression noted in the right proximal femur and right iliac bone.  There is decreased metabolic uptake in the right lamina of T9 with SUV max 4.7 when previously it was 8.0.  Other lesions are stable.    There are no pathologically enlarged mediastinal, hilar or axillary lymph nodes. Calcified subcarinal lymph nodes. There are no suspicious lung nodules or evidence for infection and previous left lower lobe consolidation has resolved.     CA 27-29 also had increased significantly and it was 257 on 1/28/2020.    Due to progression we switched to eribulin on 1/28/2020.        After 3 cycles, she had a repeat PET/CT on 3/27/2020 which showed a stable size of the bone metastatic lesions although the FDG avidity was less, consistent with treatment response.    She had MRI of the thoracic spine on 4/3/2020 and it was compared to the one which was  done in August 2019 and it showed increased size of several metastatic lesions most notably at T9 but also at T5-T7.  Other lesions at T10, T11 and T12 appeared improved.    CA 27-29 was also down to 222 on 3/18/2020.    I believe overall this is consistent with a partial response to the treatment.  Overall she is tolerating eribulin well with some worsening of neuropathy and cytopenias.     We decided on continuing the chemotherapy with some modifications and she got cycle #4 with slightly decreased dose of eribulin to 1.2 mg/m2.      After 8 cycles of eribulin repeat PET CT on 7/17/2020 showed only minimally increased FDG uptake in T9 and T10 and in the left anterior fifth rib near the costochondral junction.  That is slightly decreased FDG avidity in the right femoral neck lesion.  Otherwise bone disease is a stable.  No other evidence of metastatic disease is found.    CA 27-29 on 6/30/2020 was 308.    We decided on continuing the same chemotherapy because overall clinical picture was consistent with stable to only minimally progressive disease and she was tolerating treatments well with decent quality of life.    Cycle #10 was delayed by 1 week because ANC was 0.9.      She is doing well and we will plan to proceed with cycle #13 today if labs are okay.  We will plan to repeat PET scan before cycle #14.  Last CA 27-29 was 341 on 9/29/2020.  We will repeat it today.        Recently FDA approved sacituzumab govitecan-hziy (Trodelvy) for TNBC, based on the results of the phase II IMMU-132-01 clinical trial. So this might become an option for her in the future.       Nausea and vomiting.  This was much better with this chemotherapy.  Continue antiemetics as needed.      Cytopenias.  Off-and-on she has had mild leukopenia/neutropenia/anemia from chemotherapy but the last one on 10/6/2020 was normal.  Repeat labs today.    Bilateral pulmonary emboli.  She has been asymptomatic and these were incidentally found on  7/17/2020.  She is on Eliquis and is tolerating it well.  No bleeding.  Continue that.      Neuropathy.  Currently she notices only minimal neuropathy.  Previously we decreased the dose of eribulin starting with cycle #4 because neuropathy was worsening but lately it has not been bothering her.  She has baseline balance issues and heat and cold sensitivity from multiple sclerosis.       Pain management.  She notices more pain during the night and mornings are better.  She is following closely with palliative care and continues to be on morphine sulfate and immediate release morphine which she usually takes only at night.       Bone disease.  She has metastatic disease to the bone.  Previously she got palliative radiation to T12-L5 3000 cGy in 10 fractions from 9/6/2019 till 9/18/2019.  She was evaluated by orthopedics Dr. Bipin Elliott on 11/1/2019 and conservative management was recommended.    We are giving her little better every 3 months. She last received on 9/29/2020.  She will continue calcium and vitamin D.      We did not address the following today.    Sleeping problems.  Continue trazodone.    Vision changes.    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  It was thought that this could be due to Taxol as patient reported to the ophthalmologist that she was on Taxol in September 2019 when her symptoms started.  But she was not on Taxol in September 2019 when she started noticing the visual changes so Taxol is not responsible for this.  The first dose of Taxol was on 11/5/2019.   Her vision is much better now.  She will continue to follow closely with ophthalmology.       Increased FDG ability in the colon.  She had FDG uptake in the cecum and ascending colon but no corresponding lesion was seen on the CT scan.  She is completely asymptomatic so at this time we will continue to observe.    Fatigue.  This is from the cancer and chemotherapy.  I again encouraged her  to do regular exercise.  Continue to follow with cancer rehab.        Discussion regarding healthcare directives.  On previous visit she told me that she will bring the health care directive for our records but she forgot so I told her to bring it on next visit.      Breast implant removal.  She tells me that she was planning to do breast implant removal because there was a recall on this.  Her surgery was scheduled for 9/24/2019.  Because of this new development of metastatic breast cancer and her recent radiation, surgery has been canceled.  We discussed that at this time treatment for metastatic breast cancer would take precedence over the other surgery as the other surgery would require her to be off chemotherapy for several weeks and in that case the chances of cancer progression would be high and I would not recommend that.    Multiple sclerosis.  She will continue to follow with her neurologist Dr. Quiles.  Currently multiple sclerosis is under control and currently she is not taking any medications.    Return to clinic in 3 weeks.    I answered all of her questions to her satisfaction and she agrees with this plan.        Dewayne Zepeda MD    Video start time. 9:44 AM  Video stop time. 9:53 AM              Again, thank you for allowing me to participate in the care of your patient.        Sincerely,        Dewayne Zepeda MD

## 2020-10-20 NOTE — PROGRESS NOTES
ONCOLOGY FOLLOW UP NOTE: 10/20/2020        I took the history from reviewing the previous notes that she was following with Dr. Amaya.  I have copied and updated from prior notes.    ONCOLOGY History:    1.  January 2006:  Diagnosed with Stage IIB, T2 N1 M0 invasive lobular carcinoma of the right breast.  Final pathology showed a 4.5 x 3.8 x 2.5 cm, 01/14 lymph nodes positive.  Estrogen, progesterone receptor positive, HER-2 negative.     2.  Genetics.  BRCA1 and 2 mutations not detected. 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       THERAPY TO DATE:   1.  2006:  ECOG 2104 protocol of dose-dense Adriamycin, Cytoxan and Avastin x4 cycles followed by Taxol and Avastin x4 cycles.   2.  03/2007:  Completed 1 year Avastin.   3.  11/2006-01/2007:  Radiotherapy to the right breast of 5040 cGy.   4.  03/[id number]:  Aromasin.  Stopped after moving to the [facility].   5.  01/2016:  Right modified radical mastectomy with latissimus dorsi flap reconstruction and a left prophylactic mastectomy with latissimus dorsi flap reconstruction.     She recently had MRI of the brain as a follow-up for multiple sclerosis and there were multiple calvarial lesions noted which was suspicious for metastatic disease.  There was no evidence of new multiple sclerosis lesions.  She had a PET scan on 8/30/2019 which showed widespread bone metastatic lesions (calvarium, spine, sacrum, pelvis, ribs most prominent at C7, T3, L1 and L4), hypermetabolic 3 cm lesion in LLL, and mediastinal/hilar LN. CEA wnl at 1.9 and C27-29 elevated to 415 (28 on 8/23/16). A CT guided biopsy of the right iliac bone was obtained on 9/4/19, pathology consistent with metastatic adenocarcinoma (breast), ER/LA negative, HER-2 negative PDL 1 < 1%. A second biopsy was take of the LLL nodule via EBUS on 9/5/19 did not show any malignancy.    C/T/L spine MRI 8/3/19 to 9/2/19 with numerous enhancing lesions of the C, T and L spine, largest around T9 and L1. No  evidence of cord compression. Has a L1 compression fracture and impending L4.     Received radiation T12-L5 3000 cGy in 10 fractions from 9/6/2019 till 9/18/2019.  Neurosurgery recommended no surgical intervention but to wear [place] brace when out of bed and HOB >30 degrees    She started palliative Xeloda 2000/1500 on 10/1/2019 but after just a few doses she noticed nausea, red eyes blurred vision, loss of appetite.  She stopped taking it after 5 doses and was seen by nurse practitioner on 10/7/2019 when she was improving.  At that point she was restarted on Xeloda at a lower dose of 1000 mg twice a day.  As she was not able to tolerate even the lower dose with extreme nausea and vomiting and feeling extremely fatigued and blurry vision, we decided on stopping Xeloda.    We decided on repeating scans and MRI brain on 10/25/2019 showed no intracranial metastatic disease.   Multiple enhancing calvarial lesions may be increased in number and are suggestive of metastatic disease. Thinner imaging on the current study may identify the smaller lesions and may be responsible for  identified more lesions.   There is a single focus of T2 hyperintense signal within the anterior right temporal lobe may represent interval demyelination. There is no evidence for active demyelination.    PET/CT on 11/1/2019 showed favorable response to therapy and Overall FDG uptake within scattered osseous metastases is decreased since 8/30/2019, particularly within the pelvis. Increased sclerotic appearance of L1 and L4 pathologic compression deformities, likely sequela of recently completed palliative radiation therapy.    No significant FDG uptake in previously demonstrated hypermetabolic mediastinal lymph nodes. Biopsy negative on 9/5/2019.  There is also decreased FDG uptake in the left lower lobe (biopsy negative for  malignancy), now with dense consolidation containing air bronchograms suggestive of infection versus aspiration.  There is  diffuse FDG uptake within the esophagus, consistent with esophagitis.    Because of intolerance to Xeloda we decided on switching to weekly paclitaxel on 11/5/2019.    C#2 Taxol 12/4/19- started with 70mg/m2 dose reduction    C#3 Taxol 12/30/2019     PET/CT on 1/24/2020 showed progression of the disease in some of the bone lesions including increase in size and lytic lesion within the vertebral body of C4 measuring 1 cm as opposed to 0.6 cm previously and a new central soft tissue nodule with SUV max 4.1 when previously it was non-hypermetabolic.  There is also some progression noted in the right proximal femur and right iliac bone.  There is decreased metabolic uptake in the right lamina of T9 with SUV max 4.7 when previously it was 8.0.  Other lesions are stable.    CA 27-29 also had increased significantly and it was 257 on 1/28/2020.    We decided on switching to eribulin on 1/28/2020.    C#2 2/18/2020  C#2 D#8 2/25/2020    C#3 D#1 3/18/2020 -delayed by 1 week as per patient's preference because she wanted to visit her family.    She had a repeat PET/CT on 3/27/2020 which showed stable size of the bone metastatic lesions although the FDG avidity was less, consistent with treatment response.    She had MRI of the thoracic spine on 4/3/2020 and it was compared to the one which was done in August 2019 and it showed increased size of several metastatic lesions most notably at T9 but also at T5-T7.  Other lesions at T10, T11 and T12 appeared improved.    CA 27-29 was also down to 222 on 3/18/2020.    Cycle #4 eribulin ( dose reduced to 1.2 mg/m2 )-4/7/2020-   Cycle #5 Eribulin ( 1.2 mg/m2 )- 4/28/2020   Cycle #6 Eribulin ( 1.2 mg/m2 )- 5/19/2020     Cycle #7 Eribulin ( 1.2 mg/m2 )- 6/9/2020    Cycle #8 Eribulin ( 1.2 mg/m2 )- 6/30/2020     She had a repeat PET scan on 7/17/2020 which showed incidental finding of new acute bilateral pulmonary embolism in the distal left main pulmonary artery extending in the left  upper and lower lobes and in the segmental and branch arteries in the right lower lobe.    It also showed mildly increased FDG avidity in the lytic lesion in the T9 lamina and right pedicle with SUV max 5.3, previously 3.9.  That is also slightly increased FDG uptake to the left anterior fifth rib at the costochondral junction SUV max 3.9, previously 2.5.  There is slightly decreased FDG uptake in the right femoral neck lesion SUV max 2.2, previously 2.9.  FDG uptake in other bone lesions is fairly stable.  No new metastasis are seen.    CA 27-29 was 308 on 6/30/2020.  It was 286 on 5/19/2020.    Overall this is consistent with only slight progression versus stable disease.    She was started on Lovenox.    Cycle #9 eribulin 7/21/2020. CA 27-29 was 238    C#10 eribulin 8/18/2020. ( delayed by one week for ANC 0.9 )    C#11 Eribulin 9/8/2020    C#12 Eribulin 9/29/2020     C#13 Eribulin 10/20/2020 ( planned )      I had also recommended starting Zometa so she got dental clearance to start with that.  She received Zometa on 10/23/2019 and 1/14/2020 and 4/7/2020 and 6/30/2020 and 9/29/2020 on a 3 monthly schedule.      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  It was thought that this could be due to Taxol as patient reported to the ophthalmologist that she was on Taxol in September 2019 when her symptoms started.  But she was not on Taxol in September 2019 when she started noticing the visual changes so Taxol is not responsible for this.  The first dose of Taxol was on 11/5/2019.       Interval history.  This is a video visit.   Last chemotherapy went very well and she did not have any significant nausea or vomiting.  She was able to eat and drink well and denies any weight loss.  No diarrhea.  She continues to have issues with back pain and she mentions that usually the mornings are good but by the nighttime she has more pain so she takes MS Contin and immediate release morphine  "at night.  She has not increased the usage of pain medications.  Neuropathy is very mild and stable.  No new swellings.  Overall energy is good.  Denies fevers infections or shortness of breath.        ECOG 1    ROS:  Rest of the comprehensive review of the system was essentially unremarkable.      I reviewed other history in epic as below.       PAST MEDICAL HISTORY:     1.  Breast cancer  2.  Multiple sclerosis.   3.  Depression.   4.  Migraines.   5.  Hypertension.   6.  Cholecystectomy and umbilical hernia repair.     SOCIAL HISTORY: She smoked for 5 years but quit many years ago.  Drinks alcohol socially.  She lives with her .  She is a teacher in middle school.       FAMILY HISTORY: She mentions that her mother had breast cancer at 49.  Both grandmothers had breast cancer but she is not sure of the age.  A couple of cousins have cancers.  Patient has 3 children who are healthy.    Current Outpatient Medications   Medication     acetaminophen (TYLENOL) 500 MG tablet     apixaban ANTICOAGULANT (ELIQUIS) 5 MG tablet     busPIRone (BUSPAR) 15 MG tablet     calcium citrate-vitamin D (CITRACAL) 315-250 MG-UNIT TABS     Cholecalciferol (VITAMIN D3 PO)     cyclopentolate (CYCLOGYL) 1 % ophthalmic solution     diclofenac (VOLTAREN) 1 % topical gel     ibuprofen (ADVIL/MOTRIN) 600 MG tablet     LORazepam (ATIVAN) 0.5 MG tablet     magnesium 250 MG tablet     morphine (MS CONTIN) 15 MG CR tablet     morphine (MSIR) 15 MG IR tablet     naloxone (NARCAN) 1 mg/mL for intranasal kit (2 syringes with 2 mucosal atomizer device)     polyethylene glycol (MIRALAX/GLYCOLAX) packet     prochlorperazine (COMPAZINE) 10 MG tablet     propranolol (INDERAL LA) 60 MG 24 hr capsule     senna-docusate (SENOKOT-S/PERICOLACE) 8.6-50 MG tablet     syringe/needle, disp, (B-D INTEGRA SYRINGE) 23G X 1\" 3 ML MISC     traZODone (DESYREL) 50 MG tablet     venlafaxine (EFFEXOR-ER) 225 MG TB24 24 hr tablet     No current facility-administered " medications for this visit.      Facility-Administered Medications Ordered in Other Visits   Medication     heparin 100 UNIT/ML injection 5 mL     sodium chloride (PF) 0.9% PF flush 10-20 mL        Allergies   Allergen Reactions     Bactrim [Sulfamethoxazole W/Trimethoprim]      Internal bleeding     Copaxone [Glatiramer] Hives          PHYSICAL EXAMINATION:     There were no vitals taken for this visit.  Wt Readings from Last 4 Encounters:   10/06/20 58.6 kg (129 lb 3.2 oz)   09/29/20 59.7 kg (131 lb 11.2 oz)   09/24/20 56.8 kg (125 lb 3.2 oz)   09/15/20 60.8 kg (134 lb 1.6 oz)         Constitutional.  Does not seem to be in any acute distress.  Eyes.  No redness or discharge noted.  Respiratory.  Speaking in full sentences.  Breathing seems comfortable without any accessory use of muscles.    Skin.  Visualized his skin does not show any obvious rashes.  Musculoskeletal.  Range of motion for visualized areas is intact.  Neurological.  Alert and oriented x3.  Psychiatric.  Mood, mentation and affect are normal.  Decision making capacity is intact.      The rest of a comprehensive physical examination is deferred due to [facility] video visit restrictions.          Labs and Imaging.    Previous labs reviewed.  They are pending from today.    Combined Report of:    PET and CT on  7/17/2020 3:52 PM :     1. PET of the neck, chest, abdomen, and pelvis.  2. PET CT Fusion for Attenuation Correction and Anatomical  Localization:    3. Diagnostic CT scan of the chest, abdomen, and pelvis with  intravenous contrast for interpretation.  3. CT of the chest, abdomen and pelvis obtained for diagnostic  interpretation.  4. 3D MIP and PET-CT fused images were processed on an independent  workstation and archived to PACS and reviewed by a radiologist.     Technique:     1. PET: The patient received 13.72 mCi of F-18-FDG; the serum glucose  was 97 prior to administration, body weight was 59 kg. Images were  evaluated in  the axial, sagittal, and coronal planes as well as the  rotational whole body MIP. Images were acquired from the Vertex to the  Feet.     UPTAKE WAS MEASURED AT 60 MINUTES.      BACKGROUND:  Liver SUV max= 3.5,   Aorta Blood SUV Max: 2.6.      2. CT: Volumetric acquisition for clinical interpretation of the  chest, abdomen, and pelvis acquired at 3 mm sections after the  uneventful administration of intravenous contrast. The chest, abdomen,  and pelvis were evaluated at 5 mm sections in bone, soft tissue, and  lung windows.       The patient received 80 cc. Of Isovue 370 intravenously and 500 mL  oral Breeza contrast for the examination.       3. 3D MIP and PET-CT fused images were processed on an independent  workstation and archived to PACS and reviewed by a radiologist.     INDICATION: Metastatic breast cancer     ADDITIONAL INFORMATION OBTAINED FROM EMR: 60-year-old woman with  history of invasive lobular carcinoma of the right breast diagnosed in  2006 treated with chemoradiation and hormonal therapy. Underwent  bilateral mastectomy 2016. Radiotherapy to the lumbar spine T12-L5 in  September 2019. Currently undergoing chemotherapy with Eribulin.     COMPARISON: MRI thoracic spine 4/3/2020, PET-CT 3/27/2020, MRI  cervical spine 1/31/2020, PET-CT 1/24/2020     FINDINGS:      HEAD/NECK:  There is no suspicious FDG uptake in the neck. Symmetric FDG uptake to  the aryepiglottic folds is favored to be inflammatory in etiology.     Trace mucosal thickening in the right maxillary sinus. The remaining  paranasal sinuses are relatively clear. The mastoid air cells are  clear. No masses, mass effect or pathologically enlarged lymph nodes  are evident. Small calcifications in or adjacent to the right palatine  tonsil are similar to prior and suggest sequela of prior inflammation,  versus less likely sialoliths. No submandibular ductal ectasia. The  thyroid gland is unremarkable.     CHEST:  There is no suspicious FDG  uptake in the chest. Generalized FDG uptake  throughout the esophagus is similar to prior, for example in the the  distal esophagus/small hiatal hernia with SUV max of 5.7 (series 5,  image 202).     The central tracheobronchial tree is patent. No pleural effusion or  pneumothorax. No focal consolidation. Heart size is within normal  limits. No pericardial effusion. Filling defects within the distal  main left pulmonary artery extending into the left lower lobe  segmental and branch arteries, the proximal left upper lobe segmental  arteries, as well as additional filling defects within the right lower  lobe segmental branch arteries. These are new since 3/27/2020, and are  consistent with pulmonary emboli. No enlarged or hypermetabolic  mediastinal, hilar, or axillary lymph nodes. Postsurgical changes of  right axillary lymph node dissection. Post surgical changes of  bilateral mastectomy with implant reconstruction.     ABDOMEN AND PELVIS:  There is no suspicious FDG uptake in the abdomen or pelvis.     The liver, pancreas, spleen, and adrenal glands are unremarkable. The  gallbladder is surgically absent. No intrahepatic or extra hepatic  biliary ductal dilatation. Symmetric nephrographic enhancement. No  hydronephrosis. Bladder and uterus are unremarkable. Normal caliber of  the small and large bowel. No fluid collection or free fluid. No  enlarged or hypermetabolic lymph nodes in the abdomen or pelvis.     LOWER EXTREMITIES:   No abnormal masses or hypermetabolic soft tissue lesions.     BONES:   Increased FDG uptake to the lytic lesion in the T9 lamina and T10  right pedicle, with SUV max 5.3 (series 5, image 206), previously 3.9.        Increased FDG uptake to the left anterior fifth rib at the  costochondral junction, SUV max 3.9 (series 5, image 211), previously  2.5. Additional chronic bilateral rib fractures with mild FDG uptake  are similar to prior.     Additional scattered lytic lesions in the  calvarium, spine, pelvis,  and right femoral neck are similar to prior and demonstrate only the  levels of FDG uptake. For example (series 5):  - Image 110: Left side of C4 vertebral body, SUV max 2.7, previously  2.7.  - Image 328: Right iliac crest SUV max 2.7, previously 2.6.  - Image 341: RIght iliac crest SUV max 2.1, previously 2.2.  - Image 379: Right femoral neck SUV max 2.2, previously 2.9.     Multilevel degenerative changes in the spine. Unchanged severe  compression deformity of the L1 vertebral body with greater than 75%  central height loss. Relative photopenia to the T12-L5 vertebral  bodies on PET images corresponds with history of radiotherapy.                                                                         IMPRESSION: In this patient with a history of metastatic breast cancer  status post chemotherapy, radiotherapy, and bilateral mastectomies:     1. New acute bilateral pulmonary emboli in the distal left main  pulmonary artery extending in the left upper and lower lobes, and in  the segmental and branch arteries in the right lower lobe.     2. Increased FDG uptake to the lytic lesion in the T9-10 posterior  elements concerning for progression of viable tumor.     3. Increased FDG uptake to the anterior left 5th rib at the  costochondral junction, indeterminant. Differential diagnosis includes  degenerative uptake, posttraumatic changes, and progressive  metastasis. Trauma is felt less likely given absence of traumatic  findings on CT, although multiple additional chronic rib fractures are  seen.     4. Additional lytic lesions with low levels of FDG uptake are similar  to 3/27/2020 and most consistent with treated disease.     5. Generalized diffuse FDG uptake to the esophagus and small hiatal  hernia, most consistent with inflammation/esophagitis.      ASSESSMENT AND PLAN:   1.  History of stage IIB, T2 N1 M0 invasive lobular carcinoma of the right breast.  It was initially diagnosed in  2006 and at that time it was hormone receptor positive, HER-2 negative.  She was treated on ECOG 2104 protocol with dose-dense Adriamycin, Cytoxan and Avastin x4 cycles followed by Taxol and Avastin x4 cycles followed by a Avastin for 1 year.  She also received radiation to the right breast which she completed in January 2007 (5040 cGy).  She took Aromasin from 2007 to 2014.    Now she has metastatic breast cancer with extensive involvement of the bones.  There is also a left lower lobe hypermetabolic lesion and mediastinal lymph nodes which are hypermetabolic.  The biopsy from the right iliac bone is consistent with metastatic lobular breast cancer but it is hormone receptor and HER-2 negative and PDL 1 is also negative.    She has received 3000 cGy of palliative radiation to T12-L5 from 9/6/2019 till 9/18/2019.    She was started on palliative Xeloda but she was unable to tolerate even the dose reduced medication.        We decided on repeating scans and MRI brain on 10/25/2019 showed no intracranial metastatic disease.   Multiple enhancing calvarial lesions may be increased in number and are suggestive of metastatic disease. Thinner imaging on the current study may identify the smaller lesions and may be responsible for identified more lesions.   There is a single focus of T2 hyperintense signal within the anterior right temporal lobe may represent interval demyelination. There is no evidence for active demyelination.    PET/CT on 11/1/2019 showed favorable response to therapy and Overall FDG uptake within scattered osseous metastases is decreased since 8/30/2019, particularly within the pelvis. Increased sclerotic appearance of L1 and L4 pathologic compression deformities, likely sequela of recently completed palliative radiation therapy.    No significant FDG uptake in previously demonstrated hypermetabolic mediastinal lymph nodes. Biopsy negative on 9/5/2019.  There is also decreased FDG uptake in the left  lower lobe (biopsy negative for malignancy), now with dense consolidation containing air bronchograms suggestive of infection versus aspiration.  There is diffuse FDG uptake within the esophagus, consistent with esophagitis.    Because of intolerance to Xeloda we decided on switching to weekly paclitaxel on 11/5/2019.    For better tolerance we decreased the dose of paclitaxel to 70 mg/m  with cycle #2.  She has completed 3 cycles of Taxol and the last chemotherapy was on 1/14/2020.      PET/CT on 1/24/2020 showed progression of the disease in some of the bone lesions including increase in size and lytic lesion within the vertebral body of C4 measuring 1 cm as opposed to 0.6 cm previously and a new central soft tissue nodule with SUV max 4.1 when previously it was non-hypermetabolic.  There is also some progression noted in the right proximal femur and right iliac bone.  There is decreased metabolic uptake in the right lamina of T9 with SUV max 4.7 when previously it was 8.0.  Other lesions are stable.    There are no pathologically enlarged mediastinal, hilar or axillary lymph nodes. Calcified subcarinal lymph nodes. There are no suspicious lung nodules or evidence for infection and previous left lower lobe consolidation has resolved.     CA 27-29 also had increased significantly and it was 257 on 1/28/2020.    Due to progression we switched to eribulin on 1/28/2020.        After 3 cycles, she had a repeat PET/CT on 3/27/2020 which showed a stable size of the bone metastatic lesions although the FDG avidity was less, consistent with treatment response.    She had MRI of the thoracic spine on 4/3/2020 and it was compared to the one which was done in August 2019 and it showed increased size of several metastatic lesions most notably at T9 but also at T5-T7.  Other lesions at T10, T11 and T12 appeared improved.    CA 27-29 was also down to 222 on 3/18/2020.    I believe overall this is consistent with a partial  response to the treatment.  Overall she is tolerating eribulin well with some worsening of neuropathy and cytopenias.     We decided on continuing the chemotherapy with some modifications and she got cycle #4 with slightly decreased dose of eribulin to 1.2 mg/m2.      After 8 cycles of eribulin repeat PET CT on 7/17/2020 showed only minimally increased FDG uptake in T9 and T10 and in the left anterior fifth rib near the costochondral junction.  That is slightly decreased FDG avidity in the right femoral neck lesion.  Otherwise bone disease is a stable.  No other evidence of metastatic disease is found.    CA 27-29 on 6/30/2020 was 308.    We decided on continuing the same chemotherapy because overall clinical picture was consistent with stable to only minimally progressive disease and she was tolerating treatments well with decent quality of life.    Cycle #10 was delayed by 1 week because ANC was 0.9.      She is doing well and we will plan to proceed with cycle #13 today if labs are okay.  We will plan to repeat PET scan before cycle #14.  Last CA 27-29 was 341 on 9/29/2020.  We will repeat it today.        Recently FDA approved sacituzumab govitecan-hziy (Trodelvy) for TNBC, based on the results of the phase II IMMU-132-01 clinical trial. So this might become an option for her in the future.       Nausea and vomiting.  This was much better with this chemotherapy.  Continue antiemetics as needed.      Cytopenias.  Off-and-on she has had mild leukopenia/neutropenia/anemia from chemotherapy but the last one on 10/6/2020 was normal.  Repeat labs today.  Addendum.  She has leukopenia with mild lymphocytopenia but neutrophil count is normal.  Continue to observe.  Dewayne Zepeda MD  10/20/2020      Bilateral pulmonary emboli.  She has been asymptomatic and these were incidentally found on 7/17/2020.  She is on Eliquis and is tolerating it well.  No bleeding.  Continue that.      Neuropathy.  Currently she notices only  minimal neuropathy.  Previously we decreased the dose of eribulin starting with cycle #4 because neuropathy was worsening but lately it has not been bothering her.  She has baseline balance issues and heat and cold sensitivity from multiple sclerosis.       Pain management.  She notices more pain during the night and mornings are better.  She is following closely with palliative care and continues to be on morphine sulfate and immediate release morphine which she usually takes only at night.       Bone disease.  She has metastatic disease to the bone.  Previously she got palliative radiation to T12-L5 3000 cGy in 10 fractions from 9/6/2019 till 9/18/2019.  She was evaluated by orthopedics Dr. Bipin Elliott on 11/1/2019 and conservative management was recommended.    We are giving her little better every 3 months. She last received on 9/29/2020.  She will continue calcium and vitamin D.      We did not address the following today.    Sleeping problems.  Continue trazodone.    Vision changes.    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  It was thought that this could be due to Taxol as patient reported to the ophthalmologist that she was on Taxol in September 2019 when her symptoms started.  But she was not on Taxol in September 2019 when she started noticing the visual changes so Taxol is not responsible for this.  The first dose of Taxol was on 11/5/2019.   Her vision is much better now.  She will continue to follow closely with ophthalmology.       Increased FDG ability in the colon.  She had FDG uptake in the cecum and ascending colon but no corresponding lesion was seen on the CT scan.  She is completely asymptomatic so at this time we will continue to observe.    Fatigue.  This is from the cancer and chemotherapy.  I again encouraged her to do regular exercise.  Continue to follow with cancer rehab.        Discussion regarding healthcare directives.  On previous visit  she told me that she will bring the health care directive for our records but she forgot so I told her to bring it on next visit.      Breast implant removal.  She tells me that she was planning to do breast implant removal because there was a recall on this.  Her surgery was scheduled for 9/24/2019.  Because of this new development of metastatic breast cancer and her recent radiation, surgery has been canceled.  We discussed that at this time treatment for metastatic breast cancer would take precedence over the other surgery as the other surgery would require her to be off chemotherapy for several weeks and in that case the chances of cancer progression would be high and I would not recommend that.    Multiple sclerosis.  She will continue to follow with her neurologist Dr. Quiles.  Currently multiple sclerosis is under control and currently she is not taking any medications.    Return to clinic in 3 weeks.    I answered all of her questions to her satisfaction and she agrees with this plan.        Dewayne Zepeda MD    Video start time. 9:44 AM  Video stop time. 9:53 AM

## 2020-10-23 NOTE — PROGRESS NOTES
Palliative Care Outpatient Clinic Progress Note    Patient Name: Shaneka Alvarez is being evaluated via a billable video visit.    Primary Provider:  Mohit Barcenas  Primary Oncologist: Dr plunkett  Last seen by palliative care: myself, 5/13/20    Chief Complaint: pain    Medical History/Summary:  - breast cancer ER+/OR+, Her2 neg diagnosed in 2006              - s/p systemic treatment with adriamycin, cytoxan, avastin, aromatase inhibitor as well as b/l mastectomy              - relapsed metastatic disease found in skull, vertebrae complicated by pathological fractures L1, L5              - s/p XRT to T12-L5 Sept 2019. Didn't tolerate Xeloda, paclitaxel Nov 2019-Jan 2020, now on eribulin and zometa. Slight progression/stable disease on PET July 2020  - b/l PE found on PET July 2020, on anticoagulation  - MS with mild right-sided weakness     Interim History:  On her last visit we discussed her mood and the feelings of deep sadness she was experiencing. She was going to connect with the clinic  for guidance and support.    Patient is on the call by herself    She has lower back pain, which progressively worsens throughout the day. She takes MS Contin at bedtime with one MSIR. During the day she occasionally takes an additional 1 or 2 MSIR. This helps, takes about 30 minutes to show effect.   She is overall content with the effectiveness of this regimen. We discuss indications for adjustments and I encourage her to call should anything come up.  No side effects. No constipation.        Coping:  She has a scan coming up next week. She states that she is worried this time, since she has the increasing pain and increase in her tumor marker.   She enjoys her dog's company and is making daniel presents for her family, which is keeping her busy.    Social History:  Pertinent changes to social history/social situation since last visit: her children visited.   She is home alone with the dogs during the day, since  her  is working.   Key support resources:   Advance Directive Status:  POLST completed 20: DNR/selective treatments    Social History     Tobacco Use     Smoking status: Former Smoker     Types: Cigarettes     Quit date: 2005     Years since quittin.9     Smokeless tobacco: Never Used   Substance Use Topics     Alcohol use: Yes     Alcohol/week: 2.0 standard drinks     Types: 1 Glasses of wine, 1 Cans of beer per week     Comment: TWICE A WEEK     Drug use: No       Functional Status:  1 (Restricted in physically strenuous activity but ambulatory and able to carry out work of a light or sedentary nature)      Impression & Recommendations & Counseliny/o woman with metastatic breast cancer. Recent increase in tumor marker and pain, scan is scheduled for next week.     Pain: no change in regimen today    Coping: she is doing better than at our last visit. She is understandably concerned about the upcoming scans.      Return to clinic in 4-6 weeks    Data / Chart Review:    Review of Systems:   ROS: 10 point ROS neg other than the symptoms noted above in the HPI and pertinents here.         Physical Exam:   Physical Exam:  Vital Signs not checked, virtual visit    CONSTIT: awake, appears comfortable  EENT: MMM, EOMI, no icterus  RESP: reg, nl effort, no cough  MSK: moves x4, SABINA  SKIN: no rash, no obvious lesions  NEURO: alert, oriented x3  PSYCH: appropriate affect, memory and thought process intact    The rest of a comprehensive physical examination is deferred  due to public health emergency video visit restrictions.      Allergies   Allergen Reactions     Bactrim [Sulfamethoxazole W/Trimethoprim]      Internal bleeding     Copaxone [Glatiramer] Hives       Current pertinent medications:  MS Contin 15mg HS  MSIR 15-30mg q3h prn              Naloxone prescribed  acetaminophen 1000mg tid  Ibuprofen 600mg qid prn  Diclofenac gel, lidocaine patch     Dexamethasone 4mg  q12h     Venlafaxine 225mg daily  Lorazepam 0.5mg prn  Trazodone 50mg HS  Buspirone 15mg bid     Senna bid prn, miralax bid prn     : ok     Opioid safety assessment:   Expected prognosis >1 year  Risk: Low (ORT 0)  Indication for Opioid Use: Moderate or Strong  Baseline UDT performed on: not sent yet  Naloxone Script provided if patient also on benzodiazepine: 12/4/2019   Indications for Tapering reviewed: yes,3/26/20    Past Medical History:   Diagnosis Date     Breast cancer (H)     Age 44     Common migraine      H/O bilateral mastectomy      Mild major depression (H)      Multiple sclerosis (H)      Past Surgical History:   Procedure Laterality Date     ENDOBRONCHIAL ULTRASOUND FLEXIBLE N/A 9/5/2019    Procedure: Flexible Bronchoscopy, Endobronchial Ultrasound, Radial Probe;  Surgeon: Sree Sanchez MD;  Location: [facility] OR      REMOVAL OF OVARY/TUBE(S)       HERNIA REPAIR, UMBILICAL       mastectomy  Bilateral 2006     MASTECTOMY, BILATERAL           Lab and imaging data reviewed:  Comments:   CA 27-29: 490 (from 340 in 1 month)          Video-Visit Details    Type of service:  Video Visit    Physician has received verbal consent for a Video Visit from the patient? Yes    Video Start Time: 925    Video End Time (time video stopped): 9:48    Originating Location (pt. Location): Home    Distant Location (provider location):  [facility]     Mode of Communication:  Video Conference via Sabrina Whitaker MD  Palliative Medicine  Pager [phone]

## 2020-10-26 ENCOUNTER — VIRTUAL VISIT (OUTPATIENT)
Dept: ONCOLOGY | Facility: CLINIC | Age: 60
End: 2020-10-26
Payer: COMMERCIAL

## 2020-10-26 DIAGNOSIS — C50.919 METASTATIC BREAST CANCER: Primary | ICD-10-CM

## 2020-10-26 PROCEDURE — 99214 OFFICE O/P EST MOD 30 MIN: CPT | Mod: GT | Performed by: HOSPITALIST

## 2020-10-26 NOTE — PATIENT INSTRUCTIONS
Patient Instructions      Expand All Collapse All    Thank you for attending the Palliative Care Clinic appointment today.     1. Pain:  - continue the current regimen   - please don't hesitate to call if you'd like to discuss any changes (increase to two short-acting morphine per dose, adding a long-acting in the morning, etc)    Call if your symptoms change and the medications we have prescribed are not working.   Do not take your medications at any other dose or frequently than how they are prescribed.   I have prescribed naloxone as a rescue medication for the opioids (pain pills), which I do for all of my patients who have these medications at home.    Call us at least 3 workdays in advance if you need a medication refill.    Please have all your pill bottles ready for your next appointment.     Failure to follow these instructions may result in the palliative care team not being able to prescribe your medications.    Return to clinic in 4-6 weeks for a follow up.     You can reach the Palliative Care Team during business hours at the following number:    - [phone]    To reach the Palliative Care Provider on-call After-hours or on holidays and weekends, call: [phone].  Please note that we are not able to provide pain medication refills on evenings or weekends.

## 2020-10-26 NOTE — LETTER
10/26/2020         RE: Shaneka Alvarez  [address] [address] [address]        Dear Colleague,    Thank you for referring your patient, Shaneka Alvarez, to the [facility]. Please see a copy of my visit note below.    Palliative Care Outpatient Clinic Progress Note    Patient Name: Shaneka Alvarez is being evaluated via a billable video visit.    Primary Provider:  Mohit Barcenas  Primary Oncologist: Dr plunkett  Last seen by palliative care: myself, 5/13/20    Chief Complaint: pain    Medical History/Summary:  - breast cancer ER+/CT+, Her2 neg diagnosed in 2006              - s/p systemic treatment with adriamycin, cytoxan, avastin, aromatase inhibitor as well as b/l mastectomy              - relapsed metastatic disease found in skull, vertebrae complicated by pathological fractures L1, L5              - s/p XRT to T12-L5 Sept 2019. Didn't tolerate Xeloda, paclitaxel Nov 2019-Jan 2020, now on eribulin and zometa. Slight progression/stable disease on PET July 2020  - b/l PE found on PET July 2020, on anticoagulation  - MS with mild right-sided weakness     Interim History:  On her last visit we discussed her mood and the feelings of deep sadness she was experiencing. She was going to connect with the clinic  for guidance and support.    Patient is on the call by herself    She has lower back pain, which progressively worsens throughout the day. She takes MS Contin at bedtime with one MSIR. During the day she occasionally takes an additional 1 or 2 MSIR. This helps, takes about 30 minutes to show effect.   She is overall content with the effectiveness of this regimen. We discuss indications for adjustments and I encourage her to call should anything come up.  No side effects. No constipation.        Coping:  She has a scan coming up next week. She states that she is worried this time, since she has the increasing pain and increase in her tumor marker.   She enjoys  her dog's company and is making daniel presents for her family, which is keeping her busy.    Social History:  Pertinent changes to social history/social situation since last visit: her children visited.   She is home alone with the dogs during the day, since her  is working.   Key support resources:   Advance Directive Status:  POLST completed 20: DNR/selective treatments    Social History     Tobacco Use     Smoking status: Former Smoker     Types: Cigarettes     Quit date: 2005     Years since quittin.9     Smokeless tobacco: Never Used   Substance Use Topics     Alcohol use: Yes     Alcohol/week: 2.0 standard drinks     Types: 1 Glasses of wine, 1 Cans of beer per week     Comment: TWICE A WEEK     Drug use: No       Functional Status:  1 (Restricted in physically strenuous activity but ambulatory and able to carry out work of a light or sedentary nature)      Impression & Recommendations & Counseliny/o woman with metastatic breast cancer. Recent increase in tumor marker and pain, scan is scheduled for next week.     Pain: no change in regimen today    Coping: she is doing better than at our last visit. She is understandably concerned about the upcoming scans.      Return to clinic in 4-6 weeks    Data / Chart Review:    Review of Systems:   ROS: 10 point ROS neg other than the symptoms noted above in the HPI and pertinents here.         Physical Exam:   Physical Exam:  Vital Signs not checked, virtual visit    CONSTIT: awake, appears comfortable  EENT: MMM, EOMI, no icterus  RESP: reg, nl effort, no cough  MSK: moves x4, SABINA  SKIN: no rash, no obvious lesions  NEURO: alert, oriented x3  PSYCH: appropriate affect, memory and thought process intact    The rest of a comprehensive physical examination is deferred  due to public health emergency video visit restrictions.      Allergies   Allergen Reactions     Bactrim [Sulfamethoxazole W/Trimethoprim]      Internal bleeding      Copaxone [Glatiramer] Hives       Current pertinent medications:  MS Contin 15mg HS  MSIR 15-30mg q3h prn              Naloxone prescribed  acetaminophen 1000mg tid  Ibuprofen 600mg qid prn  Diclofenac gel, lidocaine patch     Dexamethasone 4mg q12h     Venlafaxine 225mg daily  Lorazepam 0.5mg prn  Trazodone 50mg HS  Buspirone 15mg bid     Senna bid prn, miralax bid prn     : ok     Opioid safety assessment:   Expected prognosis >1 year  Risk: Low (ORT 0)  Indication for Opioid Use: Moderate or Strong  Baseline UDT performed on: not sent yet  Naloxone Script provided if patient also on benzodiazepine: 12/4/2019   Indications for Tapering reviewed: yes,3/26/20    Past Medical History:   Diagnosis Date     Breast cancer (H)     Age 44     Common migraine      H/O bilateral mastectomy      Mild major depression (H)      Multiple sclerosis (H)      Past Surgical History:   Procedure Laterality Date     ENDOBRONCHIAL ULTRASOUND FLEXIBLE N/A 9/5/2019    Procedure: Flexible Bronchoscopy, Endobronchial Ultrasound, Radial Probe;  Surgeon: Sree Sanchez MD;  Location: [facility] OR      REMOVAL OF OVARY/TUBE(S)       HERNIA REPAIR, UMBILICAL       mastectomy  Bilateral 2006     MASTECTOMY, BILATERAL           Lab and imaging data reviewed:  Comments:   CA 27-29: 490 (from 340 in 1 month)          Video-Visit Details    Type of service:  Video Visit    Physician has received verbal consent for a Video Visit from the patient? Yes    Video Start Time: 925    Video End Time (time video stopped): 9:48    Originating Location (pt. Location): Home    Distant Location (provider location):  [facility]     Mode of Communication:  Video Conference via [employer]    Alva Whitaker MD  Palliative Medicine  Pager [phone]      Again, thank you for allowing me to participate in the care of your patient.        Sincerely,        Alva Whitaker MD

## 2020-10-26 NOTE — NURSING NOTE
"Shaneka Alvarez is a 60 year old female who is being evaluated via a billable video visit.      The patient has been notified of following:     \"This video visit will be conducted via a call between you and your physician/provider. We have found that certain health care needs can be provided without the need for an in-person physical exam.  This service lets us provide the care you need with a video conversation.  If a prescription is necessary we can send it directly to your pharmacy.  If lab work is needed we can place an order for that and you can then stop by our lab to have the test done at a later time.    Video visits are billed at different rates depending on your insurance coverage.  Please reach out to your insurance provider with any questions.    If during the course of the call the physician/provider feels a video visit is not appropriate, you will not be charged for this service.\"    Patient has given verbal consent for Video visit? Yes  How would you like to obtain your AVS? MyChart  If you are dropped from the video visit, the video invite should be resent to: Text to cell phone: [phone]  Will anyone else be joining your video visit? No          "

## 2020-10-27 ENCOUNTER — INFUSION THERAPY VISIT (OUTPATIENT)
Dept: INFUSION THERAPY | Facility: CLINIC | Age: 60
End: 2020-10-27
Payer: COMMERCIAL

## 2020-10-27 ENCOUNTER — ONCOLOGY VISIT (OUTPATIENT)
Dept: ONCOLOGY | Facility: CLINIC | Age: 60
End: 2020-10-27
Payer: COMMERCIAL

## 2020-10-27 VITALS
WEIGHT: 130.6 LBS | BODY MASS INDEX: 23.89 KG/M2 | DIASTOLIC BLOOD PRESSURE: 81 MMHG | HEART RATE: 71 BPM | TEMPERATURE: 98 F | OXYGEN SATURATION: 100 % | SYSTOLIC BLOOD PRESSURE: 115 MMHG | RESPIRATION RATE: 18 BRPM

## 2020-10-27 DIAGNOSIS — E87.6 HYPOKALEMIA: Primary | ICD-10-CM

## 2020-10-27 DIAGNOSIS — C50.919 METASTATIC BREAST CANCER: ICD-10-CM

## 2020-10-27 DIAGNOSIS — C50.911 BILATERAL MALIGNANT NEOPLASM OF BREAST IN FEMALE, UNSPECIFIED ESTROGEN RECEPTOR STATUS, UNSPECIFIED SITE OF BREAST (H): ICD-10-CM

## 2020-10-27 DIAGNOSIS — C50.912 BILATERAL MALIGNANT NEOPLASM OF BREAST IN FEMALE, UNSPECIFIED ESTROGEN RECEPTOR STATUS, UNSPECIFIED SITE OF BREAST (H): ICD-10-CM

## 2020-10-27 LAB
ALBUMIN SERPL-MCNC: 3.5 G/DL (ref 3.4–5)
ALP SERPL-CCNC: 84 U/L (ref 40–150)
ALT SERPL W P-5'-P-CCNC: 28 U/L (ref 0–50)
ANION GAP SERPL CALCULATED.3IONS-SCNC: 2 MMOL/L (ref 3–14)
AST SERPL W P-5'-P-CCNC: 23 U/L (ref 0–45)
BASOPHILS # BLD AUTO: 0.1 10E9/L (ref 0–0.2)
BASOPHILS NFR BLD AUTO: 1.4 %
BILIRUB SERPL-MCNC: 0.4 MG/DL (ref 0.2–1.3)
BUN SERPL-MCNC: 10 MG/DL (ref 7–30)
CALCIUM SERPL-MCNC: 8.9 MG/DL (ref 8.5–10.1)
CHLORIDE SERPL-SCNC: 104 MMOL/L (ref 94–109)
CO2 SERPL-SCNC: 30 MMOL/L (ref 20–32)
CREAT SERPL-MCNC: 0.47 MG/DL (ref 0.52–1.04)
DIFFERENTIAL METHOD BLD: ABNORMAL
EOSINOPHIL # BLD AUTO: 0 10E9/L (ref 0–0.7)
EOSINOPHIL NFR BLD AUTO: 0.2 %
ERYTHROCYTE [DISTWIDTH] IN BLOOD BY AUTOMATED COUNT: 13.9 % (ref 10–15)
GFR SERPL CREATININE-BSD FRML MDRD: >90 ML/MIN/{1.73_M2}
GLUCOSE SERPL-MCNC: 106 MG/DL (ref 70–99)
HCT VFR BLD AUTO: 37.4 % (ref 35–47)
HGB BLD-MCNC: 12.2 G/DL (ref 11.7–15.7)
IMM GRANULOCYTES # BLD: 0.1 10E9/L (ref 0–0.4)
IMM GRANULOCYTES NFR BLD: 2.7 %
LYMPHOCYTES # BLD AUTO: 0.5 10E9/L (ref 0.8–5.3)
LYMPHOCYTES NFR BLD AUTO: 12.4 %
MAGNESIUM SERPL-MCNC: 2.1 MG/DL (ref 1.6–2.3)
MCH RBC QN AUTO: 28.1 PG (ref 26.5–33)
MCHC RBC AUTO-ENTMCNC: 32.6 G/DL (ref 31.5–36.5)
MCV RBC AUTO: 86 FL (ref 78–100)
MONOCYTES # BLD AUTO: 0.2 10E9/L (ref 0–1.3)
MONOCYTES NFR BLD AUTO: 3.7 %
NEUTROPHILS # BLD AUTO: 3.5 10E9/L (ref 1.6–8.3)
NEUTROPHILS NFR BLD AUTO: 79.6 %
PLATELET # BLD AUTO: 196 10E9/L (ref 150–450)
POTASSIUM SERPL-SCNC: 4 MMOL/L (ref 3.4–5.3)
PROT SERPL-MCNC: 6.6 G/DL (ref 6.8–8.8)
RBC # BLD AUTO: 4.34 10E12/L (ref 3.8–5.2)
SODIUM SERPL-SCNC: 136 MMOL/L (ref 133–144)
WBC # BLD AUTO: 4.4 10E9/L (ref 4–11)

## 2020-10-27 PROCEDURE — 85025 COMPLETE CBC W/AUTO DIFF WBC: CPT | Performed by: INTERNAL MEDICINE

## 2020-10-27 PROCEDURE — 99207 PR NO CHARGE NURSE ONLY: CPT

## 2020-10-27 PROCEDURE — 80053 COMPREHEN METABOLIC PANEL: CPT | Performed by: INTERNAL MEDICINE

## 2020-10-27 PROCEDURE — 96367 TX/PROPH/DG ADDL SEQ IV INF: CPT | Performed by: NURSE PRACTITIONER

## 2020-10-27 PROCEDURE — 83735 ASSAY OF MAGNESIUM: CPT | Performed by: INTERNAL MEDICINE

## 2020-10-27 PROCEDURE — 96409 CHEMO IV PUSH SNGL DRUG: CPT | Performed by: NURSE PRACTITIONER

## 2020-10-27 PROCEDURE — 99207 PR NO CHARGE LOS: CPT

## 2020-10-27 RX ORDER — HEPARIN SODIUM (PORCINE) LOCK FLUSH IV SOLN 100 UNIT/ML 100 UNIT/ML
5 SOLUTION INTRAVENOUS
Status: DISCONTINUED | OUTPATIENT
Start: 2020-10-27 | End: 2020-10-27 | Stop reason: HOSPADM

## 2020-10-27 RX ADMIN — HEPARIN SODIUM (PORCINE) LOCK FLUSH IV SOLN 100 UNIT/ML 5 ML: 100 SOLUTION at 11:08

## 2020-10-27 RX ADMIN — Medication 250 ML: at 11:56

## 2020-10-27 RX ADMIN — HEPARIN SODIUM (PORCINE) LOCK FLUSH IV SOLN 100 UNIT/ML 5 ML: 100 SOLUTION at 12:36

## 2020-10-27 ASSESSMENT — PAIN SCALES - GENERAL: PAINLEVEL: MODERATE PAIN (4)

## 2020-10-27 NOTE — PROGRESS NOTES
"Infusion Nursing Note:  Shaneka Alvarez presents today for C13,D8 of Halaven..    Patient seen by provider today: No   present during visit today: Not Applicable.    Note: Pt states she is doing well, denies any problems with her Halaven injections, states, \"I think this is going to be my last one because my CEA level is up\".  Pt going to have a PET scan on 11/06/2020 and see MD on 11/10/2020 to discuss further treatment options.    Intravenous Access:  Implanted Port.    Treatment Conditions:  Lab Results   Component Value Date    HGB 12.2 10/27/2020     Lab Results   Component Value Date    WBC 4.4 10/27/2020      Lab Results   Component Value Date    ANEU 3.5 10/27/2020     Lab Results   Component Value Date     10/27/2020      Lab Results   Component Value Date     10/27/2020                   Lab Results   Component Value Date    POTASSIUM 4.0 10/27/2020           Lab Results   Component Value Date    MAG 2.1 10/27/2020            Lab Results   Component Value Date    CR 0.47 10/27/2020                   Lab Results   Component Value Date    RAFAELA 8.9 10/27/2020                Lab Results   Component Value Date    BILITOTAL 0.4 10/27/2020           Lab Results   Component Value Date    ALBUMIN 3.5 10/27/2020                    Lab Results   Component Value Date    ALT 28 10/27/2020           Lab Results   Component Value Date    AST 23 10/27/2020       Results reviewed, labs MET treatment parameters, ok to proceed with treatment.      Post Infusion Assessment:  Patient tolerated infusion without incident.  Blood return noted pre and post infusion.  Site patent and intact, free from redness, edema or discomfort.  No evidence of extravasations.  Access discontinued per protocol.       Discharge Plan:   Return with PET scan on 11/06/2020 and follow up with Dr Zepeda 11/10/2020  Discharge instructions reviewed with: Patient.  Patient and/or family verbalized understanding of discharge " instructions and all questions answered.  Patient discharged in stable condition accompanied by: self.  Departure Mode: Ambulatory.    Loren Costa RN

## 2020-10-27 NOTE — PROGRESS NOTES
SPIRITUAL HEALTH SERVICES Progress Note  [facility]    Visited Shaneka Alvarez  in the infusion center for ongoing emotional/spiritual support.  Offered reflective conversation, support and affirmation as she shared her concerns.       Illness Narrative - Patient is concerned that her numbers have risen to the level of when she first had cancer reoccurance.        Concerns - She will have a scan on 11/6/20 but feels that she already knows that the current treatment is not working.  She shared her apprehension about the upcoming scan.       Coping - Patient appreciates prayer and SH support. She hopes that there will be another option for treatment that she can tolerate.       Meaning-Making - Not discussed today.       Plan -  Patient  knows that she can request a Spiritual Health encounter as needed. I will check in with her periodically for spiritual support     Gina Pineda  Staff   [phone]

## 2020-10-27 NOTE — PROGRESS NOTES
Port needle-one inch- left accessed for treatment. Tolerated port access and draw without complaint. Port site scrubbed with Chloraprep for 30 seconds and allowed to dry completely prior to Opsite dressing application. Accessed using sterile technique. [place] tubes drawn-Red gel/Green/Purple tubes. Double signed by patient and RN. See documentation flowsheet. Mariah Paniagua, RN, BSN, OCN

## 2020-11-02 ENCOUNTER — TRANSFERRED RECORDS (OUTPATIENT)
Dept: HEALTH INFORMATION MANAGEMENT | Facility: CLINIC | Age: 60
End: 2020-11-02

## 2020-11-05 ENCOUNTER — MYC MEDICAL ADVICE (OUTPATIENT)
Dept: ONCOLOGY | Facility: CLINIC | Age: 60
End: 2020-11-05

## 2020-11-06 ENCOUNTER — ANCILLARY PROCEDURE (OUTPATIENT)
Dept: PET IMAGING | Facility: CLINIC | Age: 60
End: 2020-11-06
Attending: INTERNAL MEDICINE
Payer: COMMERCIAL

## 2020-11-06 DIAGNOSIS — C50.919 METASTATIC BREAST CANCER: ICD-10-CM

## 2020-11-06 DIAGNOSIS — C79.51 BONE METASTASES: ICD-10-CM

## 2020-11-06 PROCEDURE — 71260 CT THORAX DX C+: CPT | Mod: 59

## 2020-11-06 PROCEDURE — A9552 F18 FDG: HCPCS | Performed by: INTERNAL MEDICINE

## 2020-11-06 PROCEDURE — 74177 CT ABD & PELVIS W/CONTRAST: CPT | Mod: 59

## 2020-11-06 PROCEDURE — 78816 PET IMAGE W/CT FULL BODY: CPT | Mod: GC

## 2020-11-06 RX ORDER — IOPAMIDOL 755 MG/ML
50-135 INJECTION, SOLUTION INTRAVASCULAR ONCE
Status: COMPLETED | OUTPATIENT
Start: 2020-11-06 | End: 2020-11-06

## 2020-11-06 RX ADMIN — IOPAMIDOL 80 ML: 755 INJECTION, SOLUTION INTRAVASCULAR at 09:24

## 2020-11-09 DIAGNOSIS — G47.00 INSOMNIA, UNSPECIFIED TYPE: ICD-10-CM

## 2020-11-09 RX ORDER — TRAZODONE HYDROCHLORIDE 50 MG/1
50 TABLET, FILM COATED ORAL AT BEDTIME
Qty: 30 TABLET | Refills: 1 | Status: SHIPPED | OUTPATIENT
Start: 2020-11-09 | End: 2021-01-06

## 2020-11-09 NOTE — TELEPHONE ENCOUNTER
11-6-20 - Patient came into the clinic and we got all of her questions answered about her appts.    Eliza Thakur, CMA

## 2020-11-09 NOTE — TELEPHONE ENCOUNTER
SUBJECTIVE/OBJECTIVE:                                                    Refill request receive for:  Trazodone 50mg    Last refill per fax: 10/6/2020  Date of LOV r/t Medication: 10/27/20  Future appt scheduled? Yes: 11/11/2020   Date faxed to clinic: 11/9/2020    RECENT LABS/VITALS                                                      Recent Labs   Lab Test 12/29/16  0946   CHOL 291*   HDL 52   *   TRIG 172*     Lab Results   Component Value Date    AST 23 10/27/2020     Lab Results   Component Value Date    ALT 28 10/27/2020     Lab Results   Component Value Date    A1C 5.4 12/29/2016     Creatinine   Date Value Ref Range Status   10/27/2020 0.47 (L) 0.52 - 1.04 mg/dL Final   ]  Potassium   Date Value Ref Range Status   10/27/2020 4.0 3.4 - 5.3 mmol/L Final   ]  TSH   Date Value Ref Range Status   12/08/2014 1.11 mcU/mL Final     BP Readings from Last 4 Encounters:   10/27/20 115/81   10/20/20 114/80   10/06/20 113/66   09/29/20 108/74       PLAN:                                                      Sent to provider to advise.    Eliza Thakur, CMA

## 2020-11-10 ENCOUNTER — PATIENT OUTREACH (OUTPATIENT)
Dept: ONCOLOGY | Facility: CLINIC | Age: 60
End: 2020-11-10

## 2020-11-10 ENCOUNTER — VIRTUAL VISIT (OUTPATIENT)
Dept: ONCOLOGY | Facility: CLINIC | Age: 60
End: 2020-11-10
Attending: INTERNAL MEDICINE
Payer: COMMERCIAL

## 2020-11-10 DIAGNOSIS — C50.912 BILATERAL MALIGNANT NEOPLASM OF BREAST IN FEMALE, UNSPECIFIED ESTROGEN RECEPTOR STATUS, UNSPECIFIED SITE OF BREAST (H): ICD-10-CM

## 2020-11-10 DIAGNOSIS — C50.911 BILATERAL MALIGNANT NEOPLASM OF BREAST IN FEMALE, UNSPECIFIED ESTROGEN RECEPTOR STATUS, UNSPECIFIED SITE OF BREAST (H): ICD-10-CM

## 2020-11-10 DIAGNOSIS — T45.1X5A CHEMOTHERAPY-INDUCED NEUTROPENIA (H): Primary | ICD-10-CM

## 2020-11-10 DIAGNOSIS — D70.1 CHEMOTHERAPY-INDUCED NEUTROPENIA (H): Primary | ICD-10-CM

## 2020-11-10 DIAGNOSIS — C50.919 METASTATIC BREAST CANCER: ICD-10-CM

## 2020-11-10 DIAGNOSIS — C79.51 BONE METASTASES: ICD-10-CM

## 2020-11-10 DIAGNOSIS — E87.6 HYPOKALEMIA: ICD-10-CM

## 2020-11-10 PROCEDURE — 99214 OFFICE O/P EST MOD 30 MIN: CPT | Mod: GT | Performed by: INTERNAL MEDICINE

## 2020-11-10 RX ORDER — ATROPINE SULFATE 0.4 MG/ML
0.4 AMPUL (ML) INJECTION
Status: CANCELLED | OUTPATIENT
Start: 2020-11-11

## 2020-11-10 RX ORDER — ALBUTEROL SULFATE 90 UG/1
1-2 AEROSOL, METERED RESPIRATORY (INHALATION)
Status: CANCELLED
Start: 2020-11-18

## 2020-11-10 RX ORDER — LORAZEPAM 0.5 MG/1
0.5 TABLET ORAL EVERY 4 HOURS PRN
Qty: 30 TABLET | Refills: 2 | Status: ON HOLD | OUTPATIENT
Start: 2020-11-10 | End: 2020-12-16

## 2020-11-10 RX ORDER — LORAZEPAM 2 MG/ML
0.5 INJECTION INTRAMUSCULAR EVERY 4 HOURS PRN
Status: CANCELLED
Start: 2020-11-11

## 2020-11-10 RX ORDER — HEPARIN SODIUM,PORCINE 10 UNIT/ML
5 VIAL (ML) INTRAVENOUS
Status: CANCELLED | OUTPATIENT
Start: 2020-11-11

## 2020-11-10 RX ORDER — METHYLPREDNISOLONE SODIUM SUCCINATE 125 MG/2ML
125 INJECTION, POWDER, LYOPHILIZED, FOR SOLUTION INTRAMUSCULAR; INTRAVENOUS
Status: CANCELLED
Start: 2020-11-11

## 2020-11-10 RX ORDER — LOPERAMIDE HCL 2 MG
CAPSULE ORAL
Qty: 30 CAPSULE | Refills: 0 | Status: SHIPPED | OUTPATIENT
Start: 2020-11-10 | End: 2021-04-14

## 2020-11-10 RX ORDER — EPINEPHRINE 1 MG/ML
0.3 INJECTION, SOLUTION INTRAMUSCULAR; SUBCUTANEOUS EVERY 5 MIN PRN
Status: CANCELLED | OUTPATIENT
Start: 2020-11-18

## 2020-11-10 RX ORDER — EPINEPHRINE 1 MG/ML
0.3 INJECTION, SOLUTION INTRAMUSCULAR; SUBCUTANEOUS EVERY 5 MIN PRN
Status: CANCELLED | OUTPATIENT
Start: 2020-11-11

## 2020-11-10 RX ORDER — HEPARIN SODIUM,PORCINE 10 UNIT/ML
5 VIAL (ML) INTRAVENOUS
Status: CANCELLED | OUTPATIENT
Start: 2020-11-18

## 2020-11-10 RX ORDER — LORAZEPAM 2 MG/ML
0.5 INJECTION INTRAMUSCULAR EVERY 4 HOURS PRN
Status: CANCELLED
Start: 2020-11-18

## 2020-11-10 RX ORDER — ATROPINE SULFATE 0.4 MG/ML
0.4 AMPUL (ML) INJECTION
Status: CANCELLED | OUTPATIENT
Start: 2020-11-18

## 2020-11-10 RX ORDER — HEPARIN SODIUM (PORCINE) LOCK FLUSH IV SOLN 100 UNIT/ML 100 UNIT/ML
5 SOLUTION INTRAVENOUS
Status: CANCELLED | OUTPATIENT
Start: 2020-11-18

## 2020-11-10 RX ORDER — METHYLPREDNISOLONE SODIUM SUCCINATE 125 MG/2ML
125 INJECTION, POWDER, LYOPHILIZED, FOR SOLUTION INTRAMUSCULAR; INTRAVENOUS
Status: CANCELLED
Start: 2020-11-18

## 2020-11-10 RX ORDER — DIPHENHYDRAMINE HYDROCHLORIDE 50 MG/ML
50 INJECTION INTRAMUSCULAR; INTRAVENOUS
Status: CANCELLED
Start: 2020-11-18

## 2020-11-10 RX ORDER — PROCHLORPERAZINE MALEATE 10 MG
10 TABLET ORAL EVERY 6 HOURS PRN
Qty: 30 TABLET | Refills: 2 | Status: SHIPPED | OUTPATIENT
Start: 2020-11-10 | End: 2021-02-18

## 2020-11-10 RX ORDER — ACETAMINOPHEN 325 MG/1
650 TABLET ORAL ONCE
Status: CANCELLED | OUTPATIENT
Start: 2020-11-11

## 2020-11-10 RX ORDER — DIPHENHYDRAMINE HCL 25 MG
50 CAPSULE ORAL ONCE
Status: CANCELLED | OUTPATIENT
Start: 2020-11-11

## 2020-11-10 RX ORDER — ALBUTEROL SULFATE 0.83 MG/ML
2.5 SOLUTION RESPIRATORY (INHALATION)
Status: CANCELLED | OUTPATIENT
Start: 2020-11-11

## 2020-11-10 RX ORDER — ALBUTEROL SULFATE 0.83 MG/ML
2.5 SOLUTION RESPIRATORY (INHALATION)
Status: CANCELLED | OUTPATIENT
Start: 2020-11-18

## 2020-11-10 RX ORDER — ONDANSETRON 8 MG/1
8 TABLET, FILM COATED ORAL EVERY 8 HOURS PRN
Qty: 10 TABLET | Refills: 2 | Status: SHIPPED | OUTPATIENT
Start: 2020-11-10 | End: 2020-12-15

## 2020-11-10 RX ORDER — MEPERIDINE HYDROCHLORIDE 25 MG/ML
25 INJECTION INTRAMUSCULAR; INTRAVENOUS; SUBCUTANEOUS EVERY 30 MIN PRN
Status: CANCELLED | OUTPATIENT
Start: 2020-11-11

## 2020-11-10 RX ORDER — NALOXONE HYDROCHLORIDE 0.4 MG/ML
.1-.4 INJECTION, SOLUTION INTRAMUSCULAR; INTRAVENOUS; SUBCUTANEOUS
Status: CANCELLED | OUTPATIENT
Start: 2020-11-11

## 2020-11-10 RX ORDER — ACETAMINOPHEN 325 MG/1
650 TABLET ORAL ONCE
Status: CANCELLED | OUTPATIENT
Start: 2020-11-18

## 2020-11-10 RX ORDER — ALBUTEROL SULFATE 90 UG/1
1-2 AEROSOL, METERED RESPIRATORY (INHALATION)
Status: CANCELLED
Start: 2020-11-11

## 2020-11-10 RX ORDER — DIPHENHYDRAMINE HCL 25 MG
50 CAPSULE ORAL ONCE
Status: CANCELLED | OUTPATIENT
Start: 2020-11-18

## 2020-11-10 RX ORDER — MEPERIDINE HYDROCHLORIDE 25 MG/ML
25 INJECTION INTRAMUSCULAR; INTRAVENOUS; SUBCUTANEOUS EVERY 30 MIN PRN
Status: CANCELLED | OUTPATIENT
Start: 2020-11-18

## 2020-11-10 RX ORDER — SODIUM CHLORIDE 9 MG/ML
1000 INJECTION, SOLUTION INTRAVENOUS CONTINUOUS PRN
Status: CANCELLED
Start: 2020-11-11

## 2020-11-10 RX ORDER — DIPHENHYDRAMINE HYDROCHLORIDE 50 MG/ML
50 INJECTION INTRAMUSCULAR; INTRAVENOUS
Status: CANCELLED
Start: 2020-11-11

## 2020-11-10 RX ORDER — HEPARIN SODIUM (PORCINE) LOCK FLUSH IV SOLN 100 UNIT/ML 100 UNIT/ML
5 SOLUTION INTRAVENOUS
Status: CANCELLED | OUTPATIENT
Start: 2020-11-11

## 2020-11-10 RX ORDER — SODIUM CHLORIDE 9 MG/ML
1000 INJECTION, SOLUTION INTRAVENOUS CONTINUOUS PRN
Status: CANCELLED
Start: 2020-11-18

## 2020-11-10 RX ORDER — NALOXONE HYDROCHLORIDE 0.4 MG/ML
.1-.4 INJECTION, SOLUTION INTRAMUSCULAR; INTRAVENOUS; SUBCUTANEOUS
Status: CANCELLED | OUTPATIENT
Start: 2020-11-18

## 2020-11-10 NOTE — PATIENT INSTRUCTIONS
We will switch to Trodelvy tomorrow  Xgeva tomorrow    See me back 12/2/2020 @ 9 AM ( Ok to book ) labs before and chemo to follow

## 2020-11-10 NOTE — LETTER
11/10/2020         RE: Shaneka Alvarez  [id number] [facility] [address]        Dear Colleague,    Thank you for referring your patient, Shaneka Alvarez, to the [facility]. Please see a copy of my visit note below.      ONCOLOGY FOLLOW UP NOTE: 11/10/2020        I took the history from reviewing the previous notes that she was following with Dr. Amaya.  I have copied and updated from prior notes.    ONCOLOGY History:    1.  January 2006:  Diagnosed with Stage IIB, T2 N1 M0 invasive lobular carcinoma of the right breast.  Final pathology showed a 4.5 x 3.8 x 2.5 cm, 01/14 lymph nodes positive.  Estrogen, progesterone receptor positive, HER-2 negative.     2.  Genetics.  BRCA1 and 2 mutations not detected. 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       THERAPY TO DATE:   1. 2006:  ECOG 2104 protocol of dose-dense Adriamycin, Cytoxan and Avastin x4 cycles followed by Taxol and Avastin x4 cycles.   2.  03/2007:  Completed 1 year Avastin.   3.  11/2006-01/2007:  Radiotherapy to the right breast of 5040 cGy.   4.  [id number]:  Aromasin.  Stopped after moving to the [facility].   5.  01/2016:  Right modified radical mastectomy with latissimus dorsi flap reconstruction and a left prophylactic mastectomy with latissimus dorsi flap reconstruction.     She recently had MRI of the brain as a follow-up for multiple sclerosis and there were multiple calvarial lesions noted which was suspicious for metastatic disease.  There was no evidence of new multiple sclerosis lesions.  She had a PET scan on 8/30/2019 which showed widespread bone metastatic lesions (calvarium, spine, sacrum, pelvis, ribs most prominent at C7, T3, L1 and L4), hypermetabolic 3 cm lesion in LLL, and mediastinal/hilar LN. CEA wnl at 1.9 and C27-29 elevated to 415 (28 on 8/23/16). A CT guided biopsy of the right iliac bone was obtained on 9/4/19, pathology consistent with metastatic adenocarcinoma  (breast), ER/[place] negative, HER-2 negative PDL 1 < 1%. A second biopsy was take of the LLL nodule via EBUS on 9/5/19 did not show any malignancy.    C/T/L spine MRI 8/3/19 to 9/2/19 with numerous enhancing lesions of the C, T and L spine, largest around T9 and L1. No evidence of cord compression. Has a L1 compression fracture and impending L4.     Received radiation T12-L5 3000 cGy in 10 fractions from 9/6/2019 till 9/18/2019.  Neurosurgery recommended no surgical intervention but to wear Gorge brace when out of bed and HOB >30 degrees    She started palliative Xeloda 2000/1500 on 10/1/2019 but after just a few doses she noticed nausea, red eyes blurred vision, loss of appetite.  She stopped taking it after 5 doses and was seen by nurse practitioner on 10/7/2019 when she was improving.  At that point she was restarted on Xeloda at a lower dose of 1000 mg twice a day.  As she was not able to tolerate even the lower dose with extreme nausea and vomiting and feeling extremely fatigued and blurry vision, we decided on stopping Xeloda.    We decided on repeating scans and MRI brain on 10/25/2019 showed no intracranial metastatic disease.   Multiple enhancing calvarial lesions may be increased in number and are suggestive of metastatic disease. Thinner imaging on the current study may identify the smaller lesions and may be responsible for  identified more lesions.   There is a single focus of T2 hyperintense signal within the anterior right temporal lobe may represent interval demyelination. There is no evidence for active demyelination.    PET/CT on 11/1/2019 showed favorable response to therapy and Overall FDG uptake within scattered osseous metastases is decreased since 8/30/2019, particularly within the pelvis. Increased sclerotic appearance of L1 and L4 pathologic compression deformities, likely sequela of recently completed palliative radiation therapy.    No significant FDG uptake in previously demonstrated  hypermetabolic mediastinal lymph nodes. Biopsy negative on 9/5/2019.  There is also decreased FDG uptake in the left lower lobe (biopsy negative for  malignancy), now with dense consolidation containing air bronchograms suggestive of infection versus aspiration.  There is diffuse FDG uptake within the esophagus, consistent with esophagitis.    Because of intolerance to Xeloda we decided on switching to weekly paclitaxel on 11/5/2019.    C#2 Taxol 12/4/19- started with 70mg/m2 dose reduction    C#3 Taxol 12/30/2019     PET/CT on 1/24/2020 showed progression of the disease in some of the bone lesions including increase in size and lytic lesion within the vertebral body of C4 measuring 1 cm as opposed to 0.6 cm previously and a new central soft tissue nodule with SUV max 4.1 when previously it was non-hypermetabolic.  There is also some progression noted in the right proximal femur and right iliac bone.  There is decreased metabolic uptake in the right lamina of T9 with SUV max 4.7 when previously it was 8.0.  Other lesions are stable.    CA 27-29 also had increased significantly and it was 257 on 1/28/2020.    We decided on switching to eribulin on 1/28/2020.    C#2 2/18/2020  C#2 D#8 2/25/2020    C#3 D#1 3/18/2020 -delayed by 1 week as per patient's preference because she wanted to visit her family.    She had a repeat PET/CT on 3/27/2020 which showed stable size of the bone metastatic lesions although the FDG avidity was less, consistent with treatment response.    She had MRI of the thoracic spine on 4/3/2020 and it was compared to the one which was done in August 2019 and it showed increased size of several metastatic lesions most notably at T9 but also at T5-T7.  Other lesions at T10, T11 and T12 appeared improved.    CA 27-29 was also down to 222 on 3/18/2020.    Cycle #4 eribulin ( dose reduced to 1.2 mg/m2 )-4/7/2020-   Cycle #5 Eribulin ( 1.2 mg/m2 )- 4/28/2020   Cycle #6 Eribulin ( 1.2 mg/m2 )- 5/19/2020      Cycle #7 Eribulin ( 1.2 mg/m2 )- 6/9/2020    Cycle #8 Eribulin ( 1.2 mg/m2 )- 6/30/2020     She had a repeat PET scan on 7/17/2020 which showed incidental finding of new acute bilateral pulmonary embolism in the distal left main pulmonary artery extending in the left upper and lower lobes and in the segmental and branch arteries in the right lower lobe.    It also showed mildly increased FDG avidity in the lytic lesion in the T9 lamina and right pedicle with SUV max 5.3, previously 3.9.  That is also slightly increased FDG uptake to the left anterior fifth rib at the costochondral junction SUV max 3.9, previously 2.5.  There is slightly decreased FDG uptake in the right femoral neck lesion SUV max 2.2, previously 2.9.  FDG uptake in other bone lesions is fairly stable.  No new metastasis are seen.    CA 27-29 was 308 on 6/30/2020.  It was 286 on 5/19/2020.    Overall this is consistent with only slight progression versus stable disease.    She was started on Lovenox.    Cycle #9 eribulin 7/21/2020. CA 27-29 was 238    C#10 eribulin 8/18/2020. ( delayed by one week for ANC 0.9 )    C#11 Eribulin 9/8/2020    C#12 Eribulin 9/29/2020     C#13 Eribulin 10/20/2020     PET scan on 11/6/2020 shows progression of the disease with increased FDG uptake in the lytic lesions in the T9/T10 and right posterolateral elements as well as increased FDG uptake in the previously known hypermetabolic right iliac bone lesion suggesting recurrence of previously treated metastasis.  There is new subtle lesion in the right manubrium.  There is also a new fracture in the anterolateral left fourth rib with associated uptake and this could be a pathologic fracture.  Healing fracture in the left anterior fifth rib lesion.  There is resolution of the left pulmonary emboli.  Decreased FDG uptake of the esophagus consistent with improving inflammation.    CA 27-29 on 1020/2020 was also elevated at 489.    Plan to switch to Trodelvy on  11/11/2020.      After receiving dental clearance, she got Zometa on 10/23/2019 and she is receiving it every 3 months.  Last one was on 9/29/2020.  Plan to switch to Xgeva on 11/11/2020 because of progression of bone metastasis.      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  It was thought that this could be due to Taxol as patient reported to the ophthalmologist that she was on Taxol in September 2019 when her symptoms started.  But she was not on Taxol in September 2019 when she started noticing the visual changes so Taxol is not responsible for this.  The first dose of Taxol was on 11/5/2019.       Interval history.  This is a video visit.   Overall she is doing well and tolerating the chemotherapy well.  She did not have significant nausea vomiting or diarrhea.  She tells me that the pain is under decent control with morphine sulfate and immediate release morphine at night.  She has not noticed any new infections or new swellings or shortness of breath.  Energy is a stable.  Neuropathy is mild and is stable.           ECOG 1    ROS:  A comprehensive ROS was otherwise neg      I reviewed other history in epic as below.       PAST MEDICAL HISTORY:     1.  Breast cancer  2.  Multiple sclerosis.   3.  Depression.   4.  Migraines.   5.  Hypertension.   6.  Cholecystectomy and umbilical hernia repair.     SOCIAL HISTORY: She smoked for 5 years but quit many years ago.  Drinks alcohol socially.  She lives with her .  She is a teacher in middle school.       FAMILY HISTORY: She mentions that her mother had breast cancer at 49.  Both grandmothers had breast cancer but she is not sure of the age.  A couple of cousins have cancers.  Patient has 3 children who are healthy.    Current Outpatient Medications   Medication     acetaminophen (TYLENOL) 500 MG tablet     apixaban ANTICOAGULANT (ELIQUIS) 5 MG tablet     busPIRone (BUSPAR) 15 MG tablet     calcium citrate-vitamin D  "(CITRACAL) 315-250 MG-UNIT TABS     Cholecalciferol (VITAMIN D3 PO)     cyclopentolate (CYCLOGYL) 1 % ophthalmic solution     diclofenac (VOLTAREN) 1 % topical gel     ibuprofen (ADVIL/MOTRIN) 600 MG tablet     LORazepam (ATIVAN) 0.5 MG tablet     magnesium 250 MG tablet     morphine (MS CONTIN) 15 MG CR tablet     morphine (MSIR) 15 MG IR tablet     naloxone (NARCAN) 1 mg/mL for intranasal kit (2 syringes with 2 mucosal atomizer device)     polyethylene glycol (MIRALAX/GLYCOLAX) packet     prochlorperazine (COMPAZINE) 10 MG tablet     propranolol (INDERAL LA) 60 MG 24 hr capsule     senna-docusate (SENOKOT-S/PERICOLACE) 8.6-50 MG tablet     syringe/needle, disp, (B-D INTEGRA SYRINGE) 23G X 1\" 3 ML MISC     traZODone (DESYREL) 50 MG tablet     venlafaxine (EFFEXOR-ER) 225 MG TB24 24 hr tablet     No current facility-administered medications for this visit.         Allergies   Allergen Reactions     Bactrim [Sulfamethoxazole W/Trimethoprim]      Internal bleeding     Copaxone [Glatiramer] Hives          PHYSICAL EXAMINATION:     There were no vitals taken for this visit.  Wt Readings from Last 4 Encounters:   10/27/20 59.2 kg (130 lb 9.6 oz)   10/20/20 58.8 kg (129 lb 11.2 oz)   10/06/20 58.6 kg (129 lb 3.2 oz)   09/29/20 59.7 kg (131 lb 11.2 oz)           Constitutional.  Looks well and in no apparent distress.   Eyes.  Without eye redness or apparent jaundice.   Respiratory.  Non labored breathing. Speaking in full sentences.    Skin.  No concerning skin rashes on the skin visualized.   Neurological.  Is alert and oriented.  Psychiatric.  Mood and affect seem appropriate.      The rest of a comprehensive physical examination is deferred due to Public Health Emergency video visit restrictions.      Labs and Imaging.    Previous labs reviewed.  These will be done tomorrow.    Combined Report of:    PET and CT on  11/6/2020 10:44 AM :     1. PET of the neck, chest, abdomen, and pelvis.  2. PET CT Fusion for " Attenuation Correction and Anatomical  Localization:    3. Diagnostic CT scan of the chest, abdomen, and pelvis with  intravenous contrast for interpretation.  3. CT of the chest, abdomen and pelvis obtained for diagnostic  interpretation.  4. 3D MIP and PET-CT fused images were processed on an independent  workstation and archived to PACS and reviewed by a radiologist.     Technique:     1. PET: The patient received 12.41 mCi of F-18-FDG; the serum glucose  was 100 and prior to administration, body weight was 59.2 kg. Images  were evaluated in the axial, sagittal, and coronal planes as well as  the rotational whole body MIP. Images were acquired from the Vertex to  the Feet.     UPTAKE WAS MEASURED AT 60 MINUTES.      BACKGROUND:  Liver SUV max= 3.4,   Aorta Blood SUV Max: 2.0.      2. CT: Volumetric acquisition for clinical interpretation of the  chest, abdomen, and pelvis acquired at 3 mm sections after the  uneventful administration of intravenous contrast. The chest, abdomen,  and pelvis were evaluated at 5 mm sections in bone, soft tissue, and  lung windows.       The patient received 80 cc. Of Isovue 370 intravenously and 500 mL  oral present contrast for the examination.       3. 3D MIP and PET-CT fused images were processed on an independent  workstation and archived to PACS and reviewed by a radiologist.     INDICATION: Invasive breast cancer     ADDITIONAL INFORMATION OBTAINED FROM EMR: 60-year-old woman with a  history of invasive lobular carcinoma of the right breast diagnosed in  2006. With chemoradiation and hormonal therapy. Underwent bilateral  mastectomy 2016. Radiotherapy to the lumbar spine T12-L5 in September 2019. Currently on therapy with Eribulin.     COMPARISON: PET-CT 7/17/2020     FINDINGS:      HEAD/NECK:  There is no suspicious FDG uptake in the neck.      The paranasal sinuses are clear. The mastoid air cells are clear. The  mucosal pharyngeal space, the , prevertebral and  carotid  spaces are within normal limits. No masses, mass effect or  pathologically enlarged lymph nodes are evident. The thyroid gland is  unremarkable.     CHEST:  There is no suspicious FDG uptake in the chest.      Postsurgical changes of bilateral mastectomy. No abnormal FDG uptake  within the soft tissues adjacent to the breast implants. No enlarged  or hypermetabolic lymph nodes in the axillae or elsewhere in the  chest.     Heart size is within normal limits. Normal caliber of the thoracic  aorta and main pulmonary artery. Resolution of the previously seen  left-sided pulmonary emboli. No new pulmonary emboli demonstrated.  Left chest wall Port-A-Cath catheter tip terminates at the superior  cavoatrial junction. Decreased FDG uptake to the esophagus, for  example distally with SUV max 4.6 (series 4, image 201), previously  5.7, suggesting decreased inflammation. There is a small sliding-type  hiatal hernia.     The central tracheobronchial tree is patent. No pleural effusion or  pneumothorax. No focal consolidation. Mild dependent atelectasis.  Linear attenuation within the lingula also favoring atelectasis. No  suspicious pulmonary nodule.      ABDOMEN AND PELVIS:  There is no suspicious FDG uptake in the abdomen or pelvis.     The liver, pancreas, spleen, and adrenal glands are unremarkable. The  gallbladder surgically absent. No intrahepatic or extrahepatic ductal  or ductal dilatation. Symmetric nephrographic enhancement of the  kidneys. No hydronephrosis. Bladder is decompressed. Prominence of the  bladder wall may be secondary to the degree of nondistention. Atrophic  appearance of the uterus. Normal caliber of the small large bowel. No  free fluid or fluid collection. No enlarged or hypermetabolic lymph  nodes in the abdomen or pelvis. Normal caliber of the abdominal aorta.     LOWER EXTREMITIES:   No abnormal masses or hypermetabolic soft tissue lesions.     BONES:   Again demonstrated are the  multiple mixed lytic and sclerotic lesions  throughout the axial skeleton including in the calvarium, spine, ribs,  sternum, and pelvis, as well as in the right femoral neck. Majority of  these lesions demonstrate low levels of FDG uptake are similar to  prior study. However, there are multiple lesions demonstrating mildly  increased FDG uptake since 7/17/2020. Examples include (series 4):   - Image 215: Increased uptake to the expansile lytic lesion involving  the T9 lamina and T10 right pedicle with SUV max 6.2, previously SUV  max 5.3.  - Image 312: Increased uptake to mixed sclerotic/lytic lesion in the  right iliac crest with SUV max for 0.8, previously 3.0.  - Image 184: Increased uptake to a subacute nondisplaced fracture in  the anterior left 4th rib, SUV max 3.3, previously no fracture with  SUV max 2.1.  - Image 187: Increased uptake to a lesion in the lateral right 7th  rib, which is located just anterior to an old fracture, SUV max 3.7,  previously 2.7.   - Image 339: Increased uptake to a mixed sclerotic/lytic lesion in the  left sacral ala near the sacroiliac joint, SUV max 4.3, previously  3.8.     New uptake to subtle lesion in the right manubrium with mild FDG  uptake, SUV max 2.8 (series 4, image 151)     The lesion in the anterior left fifth rib previously demonstrating  increased uptake demonstrates slightly decreased uptake on the study  with SUV max 3.5 (series 4, image 210), previously 3.9. Appearances  suggestive of healing fracture. There are additional multiple chronic  rib fractures bilaterally demonstrating low levels of uptake similar  to minimally increased from prior study.     Multilevel degenerative changes in the spine. Unchanged severe  compression deformity of L1 with greater than 75% height loss.  Unchanged relative photopenia of the T12-L5 vertebral bodies, in  keeping with history of radiotherapy.                                                                      IMPRESSION:  In this patient with a history of metastatic breast cancer  status post bilateral mastectomies, chemoradiation, and currently  undergoing therapy with Eribulin, there is disease progression since  7/17/2020:     1. Worsening metastatic disease to the bones:  1a. Increased FDG uptake to the lytic lesions in the T9/T10 right  posterolateral elements.  1b. Increased FDG uptake to a previously non-hypermetabolic mixed  sclerotic/lytic lesion in the right iliac bone, suggesting recurrence  of a previously treated metastasis.  1c. New subtle lesion in the right manubrium.  1d.  New fracture in the anterolateral left 4th rib with associated  uptake. This may be a pathologic fracture.  1e. Decreased uptake to the previously increased left anterior 5th rib  lesion, which appears to be a healing fracture.     2. Resolution of the previously new left pulmonary emboli.     3. Decreased FDG uptake to the esophagus, consistent with decreased  inflammation.    ASSESSMENT AND PLAN:   1.  History of stage IIB, T2 N1 M0 invasive lobular carcinoma of the right breast.  It was initially diagnosed in 2006 and at that time it was hormone receptor positive, HER-2 negative.  She was treated on ECOG 2104 protocol with dose-dense Adriamycin, Cytoxan and Avastin x4 cycles followed by Taxol and Avastin x4 cycles followed by a Avastin for 1 year.  She also received radiation to the right breast which she completed in January 2007 (5040 cGy).  She took Aromasin from 2007 to 2014.    Now she has metastatic breast cancer with extensive involvement of the bones.  There is also a left lower lobe hypermetabolic lesion and mediastinal lymph nodes which are hypermetabolic.  The biopsy from the right iliac bone is consistent with metastatic lobular breast cancer but it is hormone receptor and HER-2 negative and PDL 1 is also negative.    She has received 3000 cGy of palliative radiation to T12-L5 from 9/6/2019 till 9/18/2019.    She was started on  palliative Xeloda but she was unable to tolerate even the dose reduced medication.        We decided on repeating scans and MRI brain on 10/25/2019 showed no intracranial metastatic disease.   Multiple enhancing calvarial lesions may be increased in number and are suggestive of metastatic disease. Thinner imaging on the current study may identify the smaller lesions and may be responsible for identified more lesions.   There is a single focus of T2 hyperintense signal within the anterior right temporal lobe may represent interval demyelination. There is no evidence for active demyelination.    PET/CT on 11/1/2019 showed favorable response to therapy and Overall FDG uptake within scattered osseous metastases is decreased since 8/30/2019, particularly within the pelvis. Increased sclerotic appearance of L1 and L4 pathologic compression deformities, likely sequela of recently completed palliative radiation therapy.    No significant FDG uptake in previously demonstrated hypermetabolic mediastinal lymph nodes. Biopsy negative on 9/5/2019.  There is also decreased FDG uptake in the left lower lobe (biopsy negative for malignancy), now with dense consolidation containing air bronchograms suggestive of infection versus aspiration.  There is diffuse FDG uptake within the esophagus, consistent with esophagitis.    Because of intolerance to Xeloda we decided on switching to weekly paclitaxel on 11/5/2019.    For better tolerance we decreased the dose of paclitaxel to 70 mg/m  with cycle #2.  She has completed 3 cycles of Taxol and the last chemotherapy was on 1/14/2020.      PET/CT on 1/24/2020 showed progression of the disease in some of the bone lesions including increase in size and lytic lesion within the vertebral body of C4 measuring 1 cm as opposed to 0.6 cm previously and a new central soft tissue nodule with SUV max 4.1 when previously it was non-hypermetabolic.  There is also some progression noted in the right  proximal femur and right iliac bone.  There is decreased metabolic uptake in the right lamina of T9 with SUV max 4.7 when previously it was 8.0.  Other lesions are stable.    There are no pathologically enlarged mediastinal, hilar or axillary lymph nodes. Calcified subcarinal lymph nodes. There are no suspicious lung nodules or evidence for infection and previous left lower lobe consolidation has resolved.     CA 27-29 also had increased significantly and it was 257 on 1/28/2020.    Due to progression we switched to eribulin on 1/28/2020.        After 3 cycles, she had a repeat PET/CT on 3/27/2020 which showed a stable size of the bone metastatic lesions although the FDG avidity was less, consistent with treatment response.    She had MRI of the thoracic spine on 4/3/2020 and it was compared to the one which was done in August 2019 and it showed increased size of several metastatic lesions most notably at T9 but also at T5-T7.  Other lesions at T10, T11 and T12 appeared improved.    CA 27-29 was also down to 222 on 3/18/2020.    I believe overall this is consistent with a partial response to the treatment.  Overall she is tolerating eribulin well with some worsening of neuropathy and cytopenias.     We decided on continuing the chemotherapy with some modifications and she got cycle #4 with slightly decreased dose of eribulin to 1.2 mg/m2.      After 8 cycles of eribulin repeat PET CT on 7/17/2020 showed only minimally increased FDG uptake in T9 and T10 and in the left anterior fifth rib near the costochondral junction.  That is slightly decreased FDG avidity in the right femoral neck lesion.  Otherwise bone disease is a stable.  No other evidence of metastatic disease is found.    CA 27-29 on 6/30/2020 was 308.    We decided on continuing the same chemotherapy because overall clinical picture was consistent with stable to only minimally progressive disease and she was tolerating treatments well with decent quality  of life.    Cycle #10 was delayed by 1 week because ANC was 0.9.        After 13 cycles of eribulin, PET scan on 11/6/2020 shows progression of the disease with increased FDG uptake in the lytic lesions in the T9/T10 and right posterolateral elements as well as increased FDG uptake in the previously known hypermetabolic right iliac bone lesion suggesting recurrence of previously treated metastasis.  There is new subtle lesion in the right manubrium.  There is also a new fracture in the anterolateral left fourth rib with associated uptake and this could be a pathologic fracture.  Healing fracture in the left anterior fifth rib lesion.  There is resolution of the left pulmonary emboli.  Decreased FDG uptake of the esophagus consistent with improving inflammation.    She is doing pretty well and her performance status is ECOG 1.    Because of progression of the disease, I recommend switching to recently FDA approved sacituzumab govitecan-hziy (Trodelvy) for TNBC, based on the results of the phase II IMMU-132-01 clinical trial.   We discussed the rational schedule and potential side effects of it in detail.    We will plan to give it to her on 11/11/2020.    Bone disease.  She has metastatic disease to the bone.  Previously she got palliative radiation to T12-L5 3000 cGy in 10 fractions from 9/6/2019 till 9/18/2019.  She was evaluated by orthopedics Dr. Bipin Elliott on 11/1/2019 and conservative management was recommended.    We are giving her Zometa every 3 months. She last received on 9/29/2020.  Because of progression of the disease in the bones, I recommend switching to Xgeva.  We will plan to start with this tomorrow.  Continue vitamin D and calcium.      Nausea and vomiting.  Use antiemetics as needed.      Cytopenias.  In the past she had chemotherapy-induced neutropenia.  With the start of Trodelvy, we will keep a close watch on her blood counts.    Bilateral pulmonary emboli.  She has been asymptomatic  and these were incidentally found on 7/17/2020.  Continue Eliquis.        Neuropathy.  She has mild neuropathy.  We will continue to observe closely when she is switched to Trodelvy. She has baseline balance issues and heat and cold sensitivity from multiple sclerosis.       Pain management.  She is following with palliative care and continues to be on morphine sulfate immediate release as well as MS Contin which she usually takes at night.     We did not address the following today.    Sleeping problems.  Continue trazodone.    Vision changes.    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  It was thought that this could be due to Taxol as patient reported to the ophthalmologist that she was on Taxol in September 2019 when her symptoms started.  But she was not on Taxol in September 2019 when she started noticing the visual changes so Taxol is not responsible for this.  The first dose of Taxol was on 11/5/2019.   Her vision is much better now.  She will continue to follow closely with ophthalmology.       Increased FDG ability in the colon.  She had FDG uptake in the cecum and ascending colon but no corresponding lesion was seen on the CT scan.  She is completely asymptomatic so at this time we will continue to observe.    Fatigue.  This is from the cancer and chemotherapy.  I again encouraged her to do regular exercise.  Continue to follow with cancer rehab.        Discussion regarding healthcare directives.  On previous visit she told me that she will bring the health care directive for our records but she forgot so I told her to bring it on next visit.      Breast implant removal.  She tells me that she was planning to do breast implant removal because there was a recall on this.  Her surgery was scheduled for 9/24/2019.  Because of this new development of metastatic breast cancer and her recent radiation, surgery has been canceled.  We discussed that at this time treatment  for metastatic breast cancer would take precedence over the other surgery as the other surgery would require her to be off chemotherapy for several weeks and in that case the chances of cancer progression would be high and I would not recommend that.    Multiple sclerosis.  She will continue to follow with her neurologist Dr. Quiles.  Currently multiple sclerosis is under control and currently she is not taking any medications.    Return to clinic in 3 weeks.    I answered all of her questions to her satisfaction and she agrees with this plan.        Dewayne Zepeda MD    Video start time. 9:14 AM  Video stop time. 9:29 AM              Again, thank you for allowing me to participate in the care of your patient.        Sincerely,        Dewayne Zepeda MD

## 2020-11-10 NOTE — PROGRESS NOTES
ONCOLOGY FOLLOW UP NOTE: 11/10/2020        I took the history from reviewing the previous notes that she was following with Dr. Amaya.  I have copied and updated from prior notes.    ONCOLOGY History:    1.  January 2006:  Diagnosed with Stage IIB, T2 N1 M0 invasive lobular carcinoma of the right breast.  Final pathology showed a 4.5 x 3.8 x 2.5 cm, 01/14 lymph nodes positive.  Estrogen, progesterone receptor positive, HER-2 negative.     2.  Genetics.  BRCA1 and 2 mutations not detected. 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       THERAPY TO DATE:   1.  2006:  ECOG 2104 protocol of dose-dense Adriamycin, Cytoxan and Avastin x4 cycles followed by Taxol and Avastin x4 cycles.   2.  03/2007:  Completed 1 year Avastin.   3.  11/2006-01/2007:  Radiotherapy to the right breast of 5040 cGy.   4.  03/[id number]:  Aromasin.  Stopped after moving to the [facility].   5.  01/2016:  Right modified radical mastectomy with latissimus dorsi flap reconstruction and a left prophylactic mastectomy with latissimus dorsi flap reconstruction.     She recently had MRI of the brain as a follow-up for multiple sclerosis and there were multiple calvarial lesions noted which was suspicious for metastatic disease.  There was no evidence of new multiple sclerosis lesions.  She had a PET scan on 8/30/2019 which showed widespread bone metastatic lesions (calvarium, spine, sacrum, pelvis, ribs most prominent at C7, T3, L1 and L4), hypermetabolic 3 cm lesion in LLL, and mediastinal/hilar LN. CEA wnl at 1.9 and C27-29 elevated to 415 (28 on 8/23/16). A CT guided biopsy of the right iliac bone was obtained on 9/4/19, pathology consistent with metastatic adenocarcinoma (breast), ER/ME negative, HER-2 negative PDL 1 < 1%. A second biopsy was take of the LLL nodule via EBUS on 9/5/19 did not show any malignancy.    C/T/L spine MRI 8/3/19 to 9/2/19 with numerous enhancing lesions of the C, T and L spine, largest around T9 and L1. No  evidence of cord compression. Has a L1 compression fracture and impending L4.     Received radiation T12-L5 3000 cGy in 10 fractions from 9/6/2019 till 9/18/2019.  Neurosurgery recommended no surgical intervention but to wear Langley brace when out of bed and HOB >30 degrees    She started palliative Xeloda 2000/1500 on 10/1/2019 but after just a few doses she noticed nausea, red eyes blurred vision, loss of appetite.  She stopped taking it after 5 doses and was seen by nurse practitioner on 10/7/2019 when she was improving.  At that point she was restarted on Xeloda at a lower dose of 1000 mg twice a day.  As she was not able to tolerate even the lower dose with extreme nausea and vomiting and feeling extremely fatigued and blurry vision, we decided on stopping Xeloda.    We decided on repeating scans and MRI brain on 10/25/2019 showed no intracranial metastatic disease.   Multiple enhancing calvarial lesions may be increased in number and are suggestive of metastatic disease. Thinner imaging on the current study may identify the smaller lesions and may be responsible for  identified more lesions.   There is a single focus of T2 hyperintense signal within the anterior right temporal lobe may represent interval demyelination. There is no evidence for active demyelination.    PET/CT on 11/1/2019 showed favorable response to therapy and Overall FDG uptake within scattered osseous metastases is decreased since 8/30/2019, particularly within the pelvis. Increased sclerotic appearance of L1 and L4 pathologic compression deformities, likely sequela of recently completed palliative radiation therapy.    No significant FDG uptake in previously demonstrated hypermetabolic mediastinal lymph nodes. Biopsy negative on 9/5/2019.  There is also decreased FDG uptake in the left lower lobe (biopsy negative for  malignancy), now with dense consolidation containing air bronchograms suggestive of infection versus aspiration.  There is  diffuse FDG uptake within the esophagus, consistent with esophagitis.    Because of intolerance to Xeloda we decided on switching to weekly paclitaxel on 11/5/2019.    C#2 Taxol 12/4/19- started with 70mg/m2 dose reduction    C#3 Taxol 12/30/2019     PET/CT on 1/24/2020 showed progression of the disease in some of the bone lesions including increase in size and lytic lesion within the vertebral body of C4 measuring 1 cm as opposed to 0.6 cm previously and a new central soft tissue nodule with SUV max 4.1 when previously it was non-hypermetabolic.  There is also some progression noted in the right proximal femur and right iliac bone.  There is decreased metabolic uptake in the right lamina of T9 with SUV max 4.7 when previously it was 8.0.  Other lesions are stable.    CA 27-29 also had increased significantly and it was 257 on 1/28/2020.    We decided on switching to eribulin on 1/28/2020.    C#2 2/18/2020  C#2 D#8 2/25/2020    C#3 D#1 3/18/2020 -delayed by 1 week as per patient's preference because she wanted to visit her family.    She had a repeat PET/CT on 3/27/2020 which showed stable size of the bone metastatic lesions although the FDG avidity was less, consistent with treatment response.    She had MRI of the thoracic spine on 4/3/2020 and it was compared to the one which was done in August 2019 and it showed increased size of several metastatic lesions most notably at T9 but also at T5-T7.  Other lesions at T10, T11 and T12 appeared improved.    CA 27-29 was also down to 222 on 3/18/2020.    Cycle #4 eribulin ( dose reduced to 1.2 mg/m2 )-4/7/2020-   Cycle #5 Eribulin ( 1.2 mg/m2 )- 4/28/2020   Cycle #6 Eribulin ( 1.2 mg/m2 )- 5/19/2020     Cycle #7 Eribulin ( 1.2 mg/m2 )- 6/9/2020    Cycle #8 Eribulin ( 1.2 mg/m2 )- 6/30/2020     She had a repeat PET scan on 7/17/2020 which showed incidental finding of new acute bilateral pulmonary embolism in the distal left main pulmonary artery extending in the left  upper and lower lobes and in the segmental and branch arteries in the right lower lobe.    It also showed mildly increased FDG avidity in the lytic lesion in the T9 lamina and right pedicle with SUV max 5.3, previously 3.9.  That is also slightly increased FDG uptake to the left anterior fifth rib at the costochondral junction SUV max 3.9, previously 2.5.  There is slightly decreased FDG uptake in the right femoral neck lesion SUV max 2.2, previously 2.9.  FDG uptake in other bone lesions is fairly stable.  No new metastasis are seen.    CA 27-29 was 308 on 6/30/2020.  It was 286 on 5/19/2020.    Overall this is consistent with only slight progression versus stable disease.    She was started on Lovenox.    Cycle #9 eribulin 7/21/2020. CA 27-29 was 238    C#10 eribulin 8/18/2020. ( delayed by one week for ANC 0.9 )    C#11 Eribulin 9/8/2020    C#12 Eribulin 9/29/2020     C#13 Eribulin 10/20/2020     PET scan on 11/6/2020 shows progression of the disease with increased FDG uptake in the lytic lesions in the T9/T10 and right posterolateral elements as well as increased FDG uptake in the previously known hypermetabolic right iliac bone lesion suggesting recurrence of previously treated metastasis.  There is new subtle lesion in the right manubrium.  There is also a new fracture in the anterolateral left fourth rib with associated uptake and this could be a pathologic fracture.  Healing fracture in the left anterior fifth rib lesion.  There is resolution of the left pulmonary emboli.  Decreased FDG uptake of the esophagus consistent with improving inflammation.    CA 27-29 on 1020/2020 was also elevated at 489.    Plan to switch to Trodelvy on 11/11/2020.      After receiving dental clearance, she got Zometa on 10/23/2019 and she is receiving it every 3 months.  Last one was on 9/29/2020.  Plan to switch to Xgeva on 11/11/2020 because of progression of bone metastasis.      She was evaluated by ophthalmology and was  noted to have cystoid macular edema.  It was thought that this could be due to Taxol as patient reported to the ophthalmologist that she was on Taxol in September 2019 when her symptoms started.  But she was not on Taxol in September 2019 when she started noticing the visual changes so Taxol is not responsible for this.  The first dose of Taxol was on 11/5/2019.       Interval history.  This is a video visit.   Overall she is doing well and tolerating the chemotherapy well.  She did not have significant nausea vomiting or diarrhea.  She tells me that the pain is under decent control with morphine sulfate and immediate release morphine at night.  She has not noticed any new infections or new swellings or shortness of breath.  Energy is a stable.  Neuropathy is mild and is stable.           ECOG 1    ROS:  A comprehensive ROS was otherwise neg      I reviewed other history in epic as below.       PAST MEDICAL HISTORY:     1.  Breast cancer  2.  Multiple sclerosis.   3.  Depression.   4.  Migraines.   5.  Hypertension.   6.  Cholecystectomy and umbilical hernia repair.     SOCIAL HISTORY: She smoked for 5 years but quit many years ago.  Drinks alcohol socially.  She lives with her .  She is a teacher in middle school.       FAMILY HISTORY: She mentions that her mother had breast cancer at 49.  Both grandmothers had breast cancer but she is not sure of the age.  A couple of cousins have cancers.  Patient has 3 children who are healthy.    Current Outpatient Medications   Medication     acetaminophen (TYLENOL) 500 MG tablet     apixaban ANTICOAGULANT (ELIQUIS) 5 MG tablet     busPIRone (BUSPAR) 15 MG tablet     calcium citrate-vitamin D (CITRACAL) 315-250 MG-UNIT TABS     Cholecalciferol (VITAMIN D3 PO)     cyclopentolate (CYCLOGYL) 1 % ophthalmic solution     diclofenac (VOLTAREN) 1 % topical gel     ibuprofen (ADVIL/MOTRIN) 600 MG tablet     LORazepam (ATIVAN) 0.5 MG tablet     magnesium 250 MG tablet      "morphine (MS CONTIN) 15 MG CR tablet     morphine (MSIR) 15 MG IR tablet     naloxone (NARCAN) 1 mg/mL for intranasal kit (2 syringes with 2 mucosal atomizer device)     polyethylene glycol (MIRALAX/GLYCOLAX) packet     prochlorperazine (COMPAZINE) 10 MG tablet     propranolol (INDERAL LA) 60 MG 24 hr capsule     senna-docusate (SENOKOT-S/PERICOLACE) 8.6-50 MG tablet     syringe/needle, disp, (B-D INTEGRA SYRINGE) 23G X 1\" 3 ML MISC     traZODone (DESYREL) 50 MG tablet     venlafaxine (EFFEXOR-ER) 225 MG TB24 24 hr tablet     No current facility-administered medications for this visit.         Allergies   Allergen Reactions     Bactrim [Sulfamethoxazole W/Trimethoprim]      Internal bleeding     Copaxone [Glatiramer] Hives          PHYSICAL EXAMINATION:     There were no vitals taken for this visit.  Wt Readings from Last 4 Encounters:   10/27/20 59.2 kg (130 lb 9.6 oz)   10/20/20 58.8 kg (129 lb 11.2 oz)   10/06/20 58.6 kg (129 lb 3.2 oz)   09/29/20 59.7 kg (131 lb 11.2 oz)           Constitutional.  Looks well and in no apparent distress.   Eyes.  Without eye redness or apparent jaundice.   Respiratory.  Non labored breathing. Speaking in full sentences.    Skin.  No concerning skin rashes on the skin visualized.   Neurological.  Is alert and oriented.  Psychiatric.  Mood and affect seem appropriate.      The rest of a comprehensive physical examination is deferred due to Public Health Emergency video visit restrictions.      Labs and Imaging.    Previous labs reviewed.  These will be done tomorrow.    Combined Report of:    PET and CT on  11/6/2020 10:44 AM :     1. PET of the neck, chest, abdomen, and pelvis.  2. PET CT Fusion for Attenuation Correction and Anatomical  Localization:    3. Diagnostic CT scan of the chest, abdomen, and pelvis with  intravenous contrast for interpretation.  3. CT of the chest, abdomen and pelvis obtained for diagnostic  interpretation.  4. 3D MIP and PET-CT fused images were " processed on an independent  workstation and archived to PACS and reviewed by a radiologist.     Technique:     1. PET: The patient received 12.41 mCi of F-18-FDG; the serum glucose  was 100 and prior to administration, body weight was 59.2 kg. Images  were evaluated in the axial, sagittal, and coronal planes as well as  the rotational whole body MIP. Images were acquired from the Vertex to  the Feet.     UPTAKE WAS MEASURED AT 60 MINUTES.      BACKGROUND:  Liver SUV max= 3.4,   Aorta Blood SUV Max: 2.0.      2. CT: Volumetric acquisition for clinical interpretation of the  chest, abdomen, and pelvis acquired at 3 mm sections after the  uneventful administration of intravenous contrast. The chest, abdomen,  and pelvis were evaluated at 5 mm sections in bone, soft tissue, and  lung windows.       The patient received 80 cc. Of Isovue 370 intravenously and 500 mL  oral present contrast for the examination.       3. 3D MIP and PET-CT fused images were processed on an independent  workstation and archived to PACS and reviewed by a radiologist.     INDICATION: Invasive breast cancer     ADDITIONAL INFORMATION OBTAINED FROM EMR: 60-year-old woman with a  history of invasive lobular carcinoma of the right breast diagnosed in  2006. With chemoradiation and hormonal therapy. Underwent bilateral  mastectomy 2016. Radiotherapy to the lumbar spine T12-L5 in September 2019. Currently on therapy with Eribulin.     COMPARISON: PET-CT 7/17/2020     FINDINGS:      HEAD/NECK:  There is no suspicious FDG uptake in the neck.      The paranasal sinuses are clear. The mastoid air cells are clear. The  mucosal pharyngeal space, the , prevertebral and carotid  spaces are within normal limits. No masses, mass effect or  pathologically enlarged lymph nodes are evident. The thyroid gland is  unremarkable.     CHEST:  There is no suspicious FDG uptake in the chest.      Postsurgical changes of bilateral mastectomy. No abnormal FDG  uptake  within the soft tissues adjacent to the breast implants. No enlarged  or hypermetabolic lymph nodes in the axillae or elsewhere in the  chest.     Heart size is within normal limits. Normal caliber of the thoracic  aorta and main pulmonary artery. Resolution of the previously seen  left-sided pulmonary emboli. No new pulmonary emboli demonstrated.  Left chest wall Port-A-Cath catheter tip terminates at the superior  cavoatrial junction. Decreased FDG uptake to the esophagus, for  example distally with SUV max 4.6 (series 4, image 201), previously  5.7, suggesting decreased inflammation. There is a small sliding-type  hiatal hernia.     The central tracheobronchial tree is patent. No pleural effusion or  pneumothorax. No focal consolidation. Mild dependent atelectasis.  Linear attenuation within the lingula also favoring atelectasis. No  suspicious pulmonary nodule.      ABDOMEN AND PELVIS:  There is no suspicious FDG uptake in the abdomen or pelvis.     The liver, pancreas, spleen, and adrenal glands are unremarkable. The  gallbladder surgically absent. No intrahepatic or extrahepatic ductal  or ductal dilatation. Symmetric nephrographic enhancement of the  kidneys. No hydronephrosis. Bladder is decompressed. Prominence of the  bladder wall may be secondary to the degree of nondistention. Atrophic  appearance of the uterus. Normal caliber of the small large bowel. No  free fluid or fluid collection. No enlarged or hypermetabolic lymph  nodes in the abdomen or pelvis. Normal caliber of the abdominal aorta.     LOWER EXTREMITIES:   No abnormal masses or hypermetabolic soft tissue lesions.     BONES:   Again demonstrated are the multiple mixed lytic and sclerotic lesions  throughout the axial skeleton including in the calvarium, spine, ribs,  sternum, and pelvis, as well as in the right femoral neck. Majority of  these lesions demonstrate low levels of FDG uptake are similar to  prior study. However, there are  multiple lesions demonstrating mildly  increased FDG uptake since 7/17/2020. Examples include (series 4):   - Image 215: Increased uptake to the expansile lytic lesion involving  the T9 lamina and T10 right pedicle with SUV max 6.2, previously SUV  max 5.3.  - Image 312: Increased uptake to mixed sclerotic/lytic lesion in the  right iliac crest with SUV max for 0.8, previously 3.0.  - Image 184: Increased uptake to a subacute nondisplaced fracture in  the anterior left 4th rib, SUV max 3.3, previously no fracture with  SUV max 2.1.  - Image 187: Increased uptake to a lesion in the lateral right 7th  rib, which is located just anterior to an old fracture, SUV max 3.7,  previously 2.7.   - Image 339: Increased uptake to a mixed sclerotic/lytic lesion in the  left sacral ala near the sacroiliac joint, SUV max 4.3, previously  3.8.     New uptake to subtle lesion in the right manubrium with mild FDG  uptake, SUV max 2.8 (series 4, image 151)     The lesion in the anterior left fifth rib previously demonstrating  increased uptake demonstrates slightly decreased uptake on the study  with SUV max 3.5 (series 4, image 210), previously 3.9. Appearances  suggestive of healing fracture. There are additional multiple chronic  rib fractures bilaterally demonstrating low levels of uptake similar  to minimally increased from prior study.     Multilevel degenerative changes in the spine. Unchanged severe  compression deformity of L1 with greater than 75% height loss.  Unchanged relative photopenia of the T12-L5 vertebral bodies, in  keeping with history of radiotherapy.                                                                      IMPRESSION: In this patient with a history of metastatic breast cancer  status post bilateral mastectomies, chemoradiation, and currently  undergoing therapy with Eribulin, there is disease progression since  7/17/2020:     1. Worsening metastatic disease to the bones:  1a. Increased FDG uptake to  the lytic lesions in the T9/T10 right  posterolateral elements.  1b. Increased FDG uptake to a previously non-hypermetabolic mixed  sclerotic/lytic lesion in the right iliac bone, suggesting recurrence  of a previously treated metastasis.  1c. New subtle lesion in the right manubrium.  1d.  New fracture in the anterolateral left 4th rib with associated  uptake. This may be a pathologic fracture.  1e. Decreased uptake to the previously increased left anterior 5th rib  lesion, which appears to be a healing fracture.     2. Resolution of the previously new left pulmonary emboli.     3. Decreased FDG uptake to the esophagus, consistent with decreased  inflammation.    ASSESSMENT AND PLAN:   1.  History of stage IIB, T2 N1 M0 invasive lobular carcinoma of the right breast.  It was initially diagnosed in 2006 and at that time it was hormone receptor positive, HER-2 negative.  She was treated on ECOG 2104 protocol with dose-dense Adriamycin, Cytoxan and Avastin x4 cycles followed by Taxol and Avastin x4 cycles followed by a Avastin for 1 year.  She also received radiation to the right breast which she completed in January 2007 (5040 cGy).  She took Aromasin from 2007 to 2014.    Now she has metastatic breast cancer with extensive involvement of the bones.  There is also a left lower lobe hypermetabolic lesion and mediastinal lymph nodes which are hypermetabolic.  The biopsy from the right iliac bone is consistent with metastatic lobular breast cancer but it is hormone receptor and HER-2 negative and PDL 1 is also negative.    She has received 3000 cGy of palliative radiation to T12-L5 from 9/6/2019 till 9/18/2019.    She was started on palliative Xeloda but she was unable to tolerate even the dose reduced medication.        We decided on repeating scans and MRI brain on 10/25/2019 showed no intracranial metastatic disease.   Multiple enhancing calvarial lesions may be increased in number and are suggestive of metastatic  disease. Thinner imaging on the current study may identify the smaller lesions and may be responsible for identified more lesions.   There is a single focus of T2 hyperintense signal within the anterior right temporal lobe may represent interval demyelination. There is no evidence for active demyelination.    PET/CT on 11/1/2019 showed favorable response to therapy and Overall FDG uptake within scattered osseous metastases is decreased since 8/30/2019, particularly within the pelvis. Increased sclerotic appearance of L1 and L4 pathologic compression deformities, likely sequela of recently completed palliative radiation therapy.    No significant FDG uptake in previously demonstrated hypermetabolic mediastinal lymph nodes. Biopsy negative on 9/5/2019.  There is also decreased FDG uptake in the left lower lobe (biopsy negative for malignancy), now with dense consolidation containing air bronchograms suggestive of infection versus aspiration.  There is diffuse FDG uptake within the esophagus, consistent with esophagitis.    Because of intolerance to Xeloda we decided on switching to weekly paclitaxel on 11/5/2019.    For better tolerance we decreased the dose of paclitaxel to 70 mg/m  with cycle #2.  She has completed 3 cycles of Taxol and the last chemotherapy was on 1/14/2020.      PET/CT on 1/24/2020 showed progression of the disease in some of the bone lesions including increase in size and lytic lesion within the vertebral body of C4 measuring 1 cm as opposed to 0.6 cm previously and a new central soft tissue nodule with SUV max 4.1 when previously it was non-hypermetabolic.  There is also some progression noted in the right proximal femur and right iliac bone.  There is decreased metabolic uptake in the right lamina of T9 with SUV max 4.7 when previously it was 8.0.  Other lesions are stable.    There are no pathologically enlarged mediastinal, hilar or axillary lymph nodes. Calcified subcarinal lymph nodes.  There are no suspicious lung nodules or evidence for infection and previous left lower lobe consolidation has resolved.     CA 27-29 also had increased significantly and it was 257 on 1/28/2020.    Due to progression we switched to eribulin on 1/28/2020.        After 3 cycles, she had a repeat PET/CT on 3/27/2020 which showed a stable size of the bone metastatic lesions although the FDG avidity was less, consistent with treatment response.    She had MRI of the thoracic spine on 4/3/2020 and it was compared to the one which was done in August 2019 and it showed increased size of several metastatic lesions most notably at T9 but also at T5-T7.  Other lesions at T10, T11 and T12 appeared improved.    CA 27-29 was also down to 222 on 3/18/2020.    I believe overall this is consistent with a partial response to the treatment.  Overall she is tolerating eribulin well with some worsening of neuropathy and cytopenias.     We decided on continuing the chemotherapy with some modifications and she got cycle #4 with slightly decreased dose of eribulin to 1.2 mg/m2.      After 8 cycles of eribulin repeat PET CT on 7/17/2020 showed only minimally increased FDG uptake in T9 and T10 and in the left anterior fifth rib near the costochondral junction.  That is slightly decreased FDG avidity in the right femoral neck lesion.  Otherwise bone disease is a stable.  No other evidence of metastatic disease is found.    CA 27-29 on 6/30/2020 was 308.    We decided on continuing the same chemotherapy because overall clinical picture was consistent with stable to only minimally progressive disease and she was tolerating treatments well with decent quality of life.    Cycle #10 was delayed by 1 week because ANC was 0.9.        After 13 cycles of eribulin, PET scan on 11/6/2020 shows progression of the disease with increased FDG uptake in the lytic lesions in the T9/T10 and right posterolateral elements as well as increased FDG uptake in the  previously known hypermetabolic right iliac bone lesion suggesting recurrence of previously treated metastasis.  There is new subtle lesion in the right manubrium.  There is also a new fracture in the anterolateral left fourth rib with associated uptake and this could be a pathologic fracture.  Healing fracture in the left anterior fifth rib lesion.  There is resolution of the left pulmonary emboli.  Decreased FDG uptake of the esophagus consistent with improving inflammation.    She is doing pretty well and her performance status is ECOG 1.    Because of progression of the disease, I recommend switching to recently FDA approved sacituzumab govitecan-hziy (Trodelvy) for TNBC, based on the results of the phase II IMMU-132-01 clinical trial.   We discussed the rational schedule and potential side effects of it in detail.    We will plan to give it to her on 11/11/2020.    Bone disease.  She has metastatic disease to the bone.  Previously she got palliative radiation to T12-L5 3000 cGy in 10 fractions from 9/6/2019 till 9/18/2019.  She was evaluated by orthopedics Dr. Bipin Elliott on 11/1/2019 and conservative management was recommended.    We are giving her Zometa every 3 months. She last received on 9/29/2020.  Because of progression of the disease in the bones, I recommend switching to Xgeva.  We will plan to start with this tomorrow.  Continue vitamin D and calcium.      Nausea and vomiting.  Use antiemetics as needed.      Cytopenias.  In the past she had chemotherapy-induced neutropenia.  With the start of Trodelvy, we will keep a close watch on her blood counts.    Bilateral pulmonary emboli.  She has been asymptomatic and these were incidentally found on 7/17/2020.  Continue Eliquis.        Neuropathy.  She has mild neuropathy.  We will continue to observe closely when she is switched to Trodelvy. She has baseline balance issues and heat and cold sensitivity from multiple sclerosis.       Pain management.   She is following with palliative care and continues to be on morphine sulfate immediate release as well as MS Contin which she usually takes at night.     We did not address the following today.    Sleeping problems.  Continue trazodone.    Vision changes.    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  It was thought that this could be due to Taxol as patient reported to the ophthalmologist that she was on Taxol in September 2019 when her symptoms started.  But she was not on Taxol in September 2019 when she started noticing the visual changes so Taxol is not responsible for this.  The first dose of Taxol was on 11/5/2019.   Her vision is much better now.  She will continue to follow closely with ophthalmology.       Increased FDG ability in the colon.  She had FDG uptake in the cecum and ascending colon but no corresponding lesion was seen on the CT scan.  She is completely asymptomatic so at this time we will continue to observe.    Fatigue.  This is from the cancer and chemotherapy.  I again encouraged her to do regular exercise.  Continue to follow with cancer rehab.        Discussion regarding healthcare directives.  On previous visit she told me that she will bring the health care directive for our records but she forgot so I told her to bring it on next visit.      Breast implant removal.  She tells me that she was planning to do breast implant removal because there was a recall on this.  Her surgery was scheduled for 9/24/2019.  Because of this new development of metastatic breast cancer and her recent radiation, surgery has been canceled.  We discussed that at this time treatment for metastatic breast cancer would take precedence over the other surgery as the other surgery would require her to be off chemotherapy for several weeks and in that case the chances of cancer progression would be high and I would not recommend that.    Multiple sclerosis.  She will continue to  follow with her neurologist Dr. Quiles.  Currently multiple sclerosis is under control and currently she is not taking any medications.    Return to clinic in 3 weeks.    I answered all of her questions to her satisfaction and she agrees with this plan.        Dewayne Zepeda MD    Video start time. 9:14 AM  Video stop time. 9:29 AM

## 2020-11-10 NOTE — PROGRESS NOTES
Reached out to patient to review new chemotherapy drug, Trodelvy (Sacituzumab) in place of old regimen as she has some progression of disease on recent scan.  Shaneka is feeling well and actually was able to go out this weekend and do some yard work.    Reviewed potential side effects, including nausea, neutropenia, diarrhea, hair loss and decrease in appetite.  Reviewed amount of time needed for first infusion ( about 4 hours) and subsequent infusions would be less.  Shaneka is familiar with side effects that would warrant call to clinic and she has phone numbers.  Also discussed that she will be switched to Xgeva from Xometa to protect her bones.  Potential side effects reviewed also.  Answered her questions.  Advised patient to make sure she has next infusion scheduled prior to leaving clinic tomorrow.  Will provide her with written information on these drugs tomorrow.

## 2020-11-10 NOTE — NURSING NOTE
"Shaneka Alvarez is a 60 year old female who is being evaluated via a billable video visit.      The patient has been notified of following:     \"This video visit will be conducted via a call between you and your physician/provider. We have found that certain health care needs can be provided without the need for an in-person physical exam.  This service lets us provide the care you need with a video conversation.  If a prescription is necessary we can send it directly to your pharmacy.  If lab work is needed we can place an order for that and you can then stop by our lab to have the test done at a later time.    Video visits are billed at different rates depending on your insurance coverage.  Please reach out to your insurance provider with any questions.    If during the course of the call the physician/provider feels a video visit is not appropriate, you will not be charged for this service.\"    Patient has given verbal consent for Video visit? Yes  How would you like to obtain your AVS? MyChart  If you are dropped from the video visit, the video invite should be resent to: Text to cell phone: [phone]  Will anyone else be joining your video visit? No      Video-Visit Details    Type of service:  Video Visit    Originating Location (pt. Location): Home    Distant Location (provider location):  [facility]     Platform used for Video Visit: Imtiaz Nieto Checklist    Discuss PET scan and next steps - chemo scheduled for 11/11.    Eliza Thakur CMA        "

## 2020-11-11 ENCOUNTER — INFUSION THERAPY VISIT (OUTPATIENT)
Dept: INFUSION THERAPY | Facility: CLINIC | Age: 60
End: 2020-11-11
Payer: COMMERCIAL

## 2020-11-11 ENCOUNTER — PATIENT OUTREACH (OUTPATIENT)
Dept: CARE COORDINATION | Facility: CLINIC | Age: 60
End: 2020-11-11

## 2020-11-11 ENCOUNTER — ONCOLOGY VISIT (OUTPATIENT)
Dept: ONCOLOGY | Facility: CLINIC | Age: 60
End: 2020-11-11
Payer: COMMERCIAL

## 2020-11-11 VITALS
SYSTOLIC BLOOD PRESSURE: 137 MMHG | TEMPERATURE: 97.9 F | HEART RATE: 72 BPM | DIASTOLIC BLOOD PRESSURE: 86 MMHG | WEIGHT: 136.4 LBS | OXYGEN SATURATION: 98 % | RESPIRATION RATE: 18 BRPM | BODY MASS INDEX: 24.95 KG/M2

## 2020-11-11 DIAGNOSIS — E87.6 HYPOKALEMIA: Primary | ICD-10-CM

## 2020-11-11 DIAGNOSIS — D70.1 CHEMOTHERAPY-INDUCED NEUTROPENIA (H): Primary | ICD-10-CM

## 2020-11-11 DIAGNOSIS — E87.6 HYPOKALEMIA: ICD-10-CM

## 2020-11-11 DIAGNOSIS — T45.1X5A CHEMOTHERAPY-INDUCED NEUTROPENIA (H): ICD-10-CM

## 2020-11-11 DIAGNOSIS — C50.911 BILATERAL MALIGNANT NEOPLASM OF BREAST IN FEMALE, UNSPECIFIED ESTROGEN RECEPTOR STATUS, UNSPECIFIED SITE OF BREAST (H): ICD-10-CM

## 2020-11-11 DIAGNOSIS — C50.912 BILATERAL MALIGNANT NEOPLASM OF BREAST IN FEMALE, UNSPECIFIED ESTROGEN RECEPTOR STATUS, UNSPECIFIED SITE OF BREAST (H): ICD-10-CM

## 2020-11-11 DIAGNOSIS — T45.1X5A CHEMOTHERAPY-INDUCED NEUTROPENIA (H): Primary | ICD-10-CM

## 2020-11-11 DIAGNOSIS — C50.919 METASTATIC BREAST CANCER: ICD-10-CM

## 2020-11-11 DIAGNOSIS — C79.51 BONE METASTASES: ICD-10-CM

## 2020-11-11 DIAGNOSIS — D70.1 CHEMOTHERAPY-INDUCED NEUTROPENIA (H): ICD-10-CM

## 2020-11-11 LAB
ALBUMIN SERPL-MCNC: 3.4 G/DL (ref 3.4–5)
ALP SERPL-CCNC: 92 U/L (ref 40–150)
ALT SERPL W P-5'-P-CCNC: 24 U/L (ref 0–50)
ANION GAP SERPL CALCULATED.3IONS-SCNC: 3 MMOL/L (ref 3–14)
AST SERPL W P-5'-P-CCNC: 20 U/L (ref 0–45)
BASOPHILS # BLD AUTO: 0 10E9/L (ref 0–0.2)
BASOPHILS NFR BLD AUTO: 1.1 %
BILIRUB SERPL-MCNC: 0.3 MG/DL (ref 0.2–1.3)
BUN SERPL-MCNC: 11 MG/DL (ref 7–30)
CALCIUM SERPL-MCNC: 8.9 MG/DL (ref 8.5–10.1)
CANCER AG27-29 SERPL-ACNC: 434 U/ML (ref 0–39)
CHLORIDE SERPL-SCNC: 105 MMOL/L (ref 94–109)
CO2 SERPL-SCNC: 30 MMOL/L (ref 20–32)
CREAT SERPL-MCNC: 0.62 MG/DL (ref 0.52–1.04)
DIFFERENTIAL METHOD BLD: ABNORMAL
EOSINOPHIL # BLD AUTO: 0 10E9/L (ref 0–0.7)
EOSINOPHIL NFR BLD AUTO: 0.6 %
ERYTHROCYTE [DISTWIDTH] IN BLOOD BY AUTOMATED COUNT: 15.3 % (ref 10–15)
GFR SERPL CREATININE-BSD FRML MDRD: >90 ML/MIN/{1.73_M2}
GLUCOSE SERPL-MCNC: 96 MG/DL (ref 70–99)
HCT VFR BLD AUTO: 36.1 % (ref 35–47)
HGB BLD-MCNC: 11.6 G/DL (ref 11.7–15.7)
IMM GRANULOCYTES # BLD: 0.1 10E9/L (ref 0–0.4)
IMM GRANULOCYTES NFR BLD: 1.7 %
LYMPHOCYTES # BLD AUTO: 0.5 10E9/L (ref 0.8–5.3)
LYMPHOCYTES NFR BLD AUTO: 14.7 %
MAGNESIUM SERPL-MCNC: 2.1 MG/DL (ref 1.6–2.3)
MCH RBC QN AUTO: 28.3 PG (ref 26.5–33)
MCHC RBC AUTO-ENTMCNC: 32.1 G/DL (ref 31.5–36.5)
MCV RBC AUTO: 88 FL (ref 78–100)
MONOCYTES # BLD AUTO: 0.6 10E9/L (ref 0–1.3)
MONOCYTES NFR BLD AUTO: 17.2 %
NEUTROPHILS # BLD AUTO: 2.3 10E9/L (ref 1.6–8.3)
NEUTROPHILS NFR BLD AUTO: 64.7 %
PHOSPHATE SERPL-MCNC: 2.9 MG/DL (ref 2.5–4.5)
PLATELET # BLD AUTO: 218 10E9/L (ref 150–450)
POTASSIUM SERPL-SCNC: 4 MMOL/L (ref 3.4–5.3)
PROT SERPL-MCNC: 6.5 G/DL (ref 6.8–8.8)
RBC # BLD AUTO: 4.1 10E12/L (ref 3.8–5.2)
SODIUM SERPL-SCNC: 138 MMOL/L (ref 133–144)
WBC # BLD AUTO: 3.5 10E9/L (ref 4–11)

## 2020-11-11 PROCEDURE — 83735 ASSAY OF MAGNESIUM: CPT | Performed by: INTERNAL MEDICINE

## 2020-11-11 PROCEDURE — 96372 THER/PROPH/DIAG INJ SC/IM: CPT | Mod: 59 | Performed by: NURSE PRACTITIONER

## 2020-11-11 PROCEDURE — 96367 TX/PROPH/DG ADDL SEQ IV INF: CPT | Performed by: NURSE PRACTITIONER

## 2020-11-11 PROCEDURE — 85025 COMPLETE CBC W/AUTO DIFF WBC: CPT | Performed by: INTERNAL MEDICINE

## 2020-11-11 PROCEDURE — 99207 PR NO CHARGE LOS: CPT

## 2020-11-11 PROCEDURE — S0028 INJECTION, FAMOTIDINE, 20 MG: HCPCS | Performed by: NURSE PRACTITIONER

## 2020-11-11 PROCEDURE — 80053 COMPREHEN METABOLIC PANEL: CPT | Performed by: INTERNAL MEDICINE

## 2020-11-11 PROCEDURE — 96415 CHEMO IV INFUSION ADDL HR: CPT | Performed by: NURSE PRACTITIONER

## 2020-11-11 PROCEDURE — 86300 IMMUNOASSAY TUMOR CA 15-3: CPT | Performed by: INTERNAL MEDICINE

## 2020-11-11 PROCEDURE — 96413 CHEMO IV INFUSION 1 HR: CPT | Performed by: NURSE PRACTITIONER

## 2020-11-11 PROCEDURE — 96375 TX/PRO/DX INJ NEW DRUG ADDON: CPT | Performed by: NURSE PRACTITIONER

## 2020-11-11 PROCEDURE — 84100 ASSAY OF PHOSPHORUS: CPT | Performed by: INTERNAL MEDICINE

## 2020-11-11 RX ORDER — ACETAMINOPHEN 325 MG/1
650 TABLET ORAL ONCE
Status: COMPLETED | OUTPATIENT
Start: 2020-11-11 | End: 2020-11-11

## 2020-11-11 RX ORDER — HEPARIN SODIUM (PORCINE) LOCK FLUSH IV SOLN 100 UNIT/ML 100 UNIT/ML
5 SOLUTION INTRAVENOUS
Status: DISCONTINUED | OUTPATIENT
Start: 2020-11-11 | End: 2020-11-11 | Stop reason: HOSPADM

## 2020-11-11 RX ADMIN — HEPARIN SODIUM (PORCINE) LOCK FLUSH IV SOLN 100 UNIT/ML 5 ML: 100 SOLUTION at 18:19

## 2020-11-11 RX ADMIN — Medication 250 ML: at 13:17

## 2020-11-11 RX ADMIN — ACETAMINOPHEN 650 MG: 325 TABLET ORAL at 13:15

## 2020-11-11 RX ADMIN — HEPARIN SODIUM (PORCINE) LOCK FLUSH IV SOLN 100 UNIT/ML 5 ML: 100 SOLUTION at 12:19

## 2020-11-11 ASSESSMENT — PAIN SCALES - GENERAL: PAINLEVEL: NO PAIN (0)

## 2020-11-11 NOTE — PROGRESS NOTES
Social Work Intervention  [facility]    Data/Intervention:    Patient Name:  Shaneka Alvarez  /Age:  1960 (60 year old)    Visit Type: in person  Referral Source: N/A  Reason for Referral:  Psychosocial support    Collaborated With:    Shaneka    Patient Concerns/Issues:   C1D1 of Trudelvy and Xgeva      Intervention/Education/Resources Provided:  SW met with Shaneka in infusion center this afternoon. Shaneka was pleasant and friendly. SW introduced self and reason for visit. Shaneka is not new to treatment, but this is a new regimen for her. She acknowledged some anxiety about the new treatment, but states that so far she is feeling well. Shaneka states that she is concerned about her insurance as she will need to resign from her job due to Covid. SW and Shaneka spoke about the process of purchasing insurance through [employer]. She feels fairly comfortable with the process, but sw encouraged her to reach out when that time comes if she needs additional support. 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     Shaneka lives with her , they have one daughter in the area and two children in [place]. She is hopeful she will be able to see them for Thanksgiving.     Supportive counseling provided. SW encouraged Shaneka to reach out with any needs.     Assessment/Plan:  SW will continue to remain available as needed and appropriate.     Provided patient/family with contact information and availability.    CHARLOTTE Johnson, [facility]  Clinical , Adult Oncology  Phone: [phone]

## 2020-11-11 NOTE — PROGRESS NOTES
Infusion Nursing Note:  Shaneka Alvarez presents today for C1,D1 of Trodelvy and Xgeva    Patient seen by provider today: No   present during visit today: Not Applicable.    Note: Pt oriented to NEW regimen of Trodelvy. Pt states she is a bit anxious to start a new chemotherapy.  Pt asked appropriate questions, able to discuss therapy with Antonietta Arana,RN -  Care Coordinator today.  Premeds given and wait time of 30 minutes observed before starting infusion for 3 hours.  Pt has a 30 minute observation post infusion.     Intravenous Access:  Implanted Port.    Treatment Conditions:  Lab Results   Component Value Date    HGB 11.6 11/11/2020     Lab Results   Component Value Date    WBC 3.5 11/11/2020      Lab Results   Component Value Date    ANEU 2.3 11/11/2020     Lab Results   Component Value Date     11/11/2020      Lab Results   Component Value Date     11/11/2020                   Lab Results   Component Value Date    POTASSIUM 4.0 11/11/2020           Lab Results   Component Value Date    MAG 2.1 11/11/2020            Lab Results   Component Value Date    CR 0.62 11/11/2020                   Lab Results   Component Value Date    RAFAELA 8.9 11/11/2020                Lab Results   Component Value Date    BILITOTAL 0.3 11/11/2020           Lab Results   Component Value Date    ALBUMIN 3.4 11/11/2020                    Lab Results   Component Value Date    ALT 24 11/11/2020           Lab Results   Component Value Date    AST 20 11/11/2020       Results reviewed, labs MET treatment parameters, ok to proceed with treatment.      Post Infusion Assessment:  Patient tolerated infusion without incident.  Patient tolerated injection without incident.  Patient observed for 30 minutes post Trodelvy per protocol.  Blood return noted pre and post infusion.  Site patent and intact, free from redness, edema or discomfort.  No evidence of extravasations.  Access discontinued per protocol.       Discharge  Plan:   Pt to make more appointments - Due for C1,D8 of therapy.  Discharge instructions reviewed with: Patient.  Patient and/or family verbalized understanding of discharge instructions and all questions answered.  Patient discharged in stable condition accompanied by: self.  Departure Mode: Ambulatory.    Loren Costa RN

## 2020-11-18 ENCOUNTER — INFUSION THERAPY VISIT (OUTPATIENT)
Dept: INFUSION THERAPY | Facility: CLINIC | Age: 60
End: 2020-11-18
Attending: INTERNAL MEDICINE
Payer: COMMERCIAL

## 2020-11-18 ENCOUNTER — OFFICE VISIT (OUTPATIENT)
Dept: ONCOLOGY | Facility: CLINIC | Age: 60
End: 2020-11-18
Attending: INTERNAL MEDICINE
Payer: COMMERCIAL

## 2020-11-18 VITALS
TEMPERATURE: 98.5 F | BODY MASS INDEX: 24.42 KG/M2 | DIASTOLIC BLOOD PRESSURE: 75 MMHG | RESPIRATION RATE: 16 BRPM | HEART RATE: 72 BPM | OXYGEN SATURATION: 100 % | SYSTOLIC BLOOD PRESSURE: 114 MMHG | WEIGHT: 133.5 LBS

## 2020-11-18 DIAGNOSIS — E87.6 HYPOKALEMIA: Primary | ICD-10-CM

## 2020-11-18 DIAGNOSIS — C50.911 BILATERAL MALIGNANT NEOPLASM OF BREAST IN FEMALE, UNSPECIFIED ESTROGEN RECEPTOR STATUS, UNSPECIFIED SITE OF BREAST (H): ICD-10-CM

## 2020-11-18 DIAGNOSIS — C50.919 METASTATIC BREAST CANCER: ICD-10-CM

## 2020-11-18 DIAGNOSIS — C50.912 BILATERAL MALIGNANT NEOPLASM OF BREAST IN FEMALE, UNSPECIFIED ESTROGEN RECEPTOR STATUS, UNSPECIFIED SITE OF BREAST (H): ICD-10-CM

## 2020-11-18 DIAGNOSIS — D70.1 CHEMOTHERAPY-INDUCED NEUTROPENIA (H): ICD-10-CM

## 2020-11-18 DIAGNOSIS — T45.1X5A CHEMOTHERAPY-INDUCED NEUTROPENIA (H): ICD-10-CM

## 2020-11-18 LAB
ALBUMIN SERPL-MCNC: 3.4 G/DL (ref 3.4–5)
ALP SERPL-CCNC: 107 U/L (ref 40–150)
ALT SERPL W P-5'-P-CCNC: 24 U/L (ref 0–50)
ANION GAP SERPL CALCULATED.3IONS-SCNC: 4 MMOL/L (ref 3–14)
AST SERPL W P-5'-P-CCNC: 17 U/L (ref 0–45)
BASOPHILS # BLD AUTO: 0 10E9/L (ref 0–0.2)
BASOPHILS NFR BLD AUTO: 0.5 %
BILIRUB SERPL-MCNC: 0.3 MG/DL (ref 0.2–1.3)
BUN SERPL-MCNC: 13 MG/DL (ref 7–30)
CALCIUM SERPL-MCNC: 8.7 MG/DL (ref 8.5–10.1)
CHLORIDE SERPL-SCNC: 105 MMOL/L (ref 94–109)
CO2 SERPL-SCNC: 28 MMOL/L (ref 20–32)
CREAT SERPL-MCNC: 0.55 MG/DL (ref 0.52–1.04)
DIFFERENTIAL METHOD BLD: ABNORMAL
EOSINOPHIL # BLD AUTO: 0 10E9/L (ref 0–0.7)
EOSINOPHIL NFR BLD AUTO: 1 %
ERYTHROCYTE [DISTWIDTH] IN BLOOD BY AUTOMATED COUNT: 14.8 % (ref 10–15)
GFR SERPL CREATININE-BSD FRML MDRD: >90 ML/MIN/{1.73_M2}
GLUCOSE SERPL-MCNC: 99 MG/DL (ref 70–99)
HCT VFR BLD AUTO: 36.1 % (ref 35–47)
HGB BLD-MCNC: 11.7 G/DL (ref 11.7–15.7)
IMM GRANULOCYTES # BLD: 0 10E9/L (ref 0–0.4)
IMM GRANULOCYTES NFR BLD: 1 %
LYMPHOCYTES # BLD AUTO: 0.7 10E9/L (ref 0.8–5.3)
LYMPHOCYTES NFR BLD AUTO: 17.7 %
MAGNESIUM SERPL-MCNC: 2.1 MG/DL (ref 1.6–2.3)
MCH RBC QN AUTO: 28.5 PG (ref 26.5–33)
MCHC RBC AUTO-ENTMCNC: 32.4 G/DL (ref 31.5–36.5)
MCV RBC AUTO: 88 FL (ref 78–100)
MONOCYTES # BLD AUTO: 0.5 10E9/L (ref 0–1.3)
MONOCYTES NFR BLD AUTO: 11.5 %
NEUTROPHILS # BLD AUTO: 2.7 10E9/L (ref 1.6–8.3)
NEUTROPHILS NFR BLD AUTO: 68.3 %
PHOSPHATE SERPL-MCNC: 3.4 MG/DL (ref 2.5–4.5)
PLATELET # BLD AUTO: 210 10E9/L (ref 150–450)
POTASSIUM SERPL-SCNC: 4 MMOL/L (ref 3.4–5.3)
PROT SERPL-MCNC: 6.6 G/DL (ref 6.8–8.8)
RBC # BLD AUTO: 4.1 10E12/L (ref 3.8–5.2)
SODIUM SERPL-SCNC: 137 MMOL/L (ref 133–144)
WBC # BLD AUTO: 3.9 10E9/L (ref 4–11)

## 2020-11-18 PROCEDURE — 80053 COMPREHEN METABOLIC PANEL: CPT | Performed by: INTERNAL MEDICINE

## 2020-11-18 PROCEDURE — 99207 PR NO CHARGE LOS: CPT

## 2020-11-18 PROCEDURE — 99207 PR NO CHARGE NURSE ONLY: CPT

## 2020-11-18 PROCEDURE — 96413 CHEMO IV INFUSION 1 HR: CPT | Performed by: INTERNAL MEDICINE

## 2020-11-18 PROCEDURE — 85025 COMPLETE CBC W/AUTO DIFF WBC: CPT | Performed by: INTERNAL MEDICINE

## 2020-11-18 PROCEDURE — 83735 ASSAY OF MAGNESIUM: CPT | Performed by: INTERNAL MEDICINE

## 2020-11-18 PROCEDURE — 84100 ASSAY OF PHOSPHORUS: CPT | Performed by: INTERNAL MEDICINE

## 2020-11-18 PROCEDURE — S0028 INJECTION, FAMOTIDINE, 20 MG: HCPCS | Performed by: INTERNAL MEDICINE

## 2020-11-18 PROCEDURE — 96375 TX/PRO/DX INJ NEW DRUG ADDON: CPT | Performed by: INTERNAL MEDICINE

## 2020-11-18 RX ORDER — HEPARIN SODIUM (PORCINE) LOCK FLUSH IV SOLN 100 UNIT/ML 100 UNIT/ML
5 SOLUTION INTRAVENOUS
Status: DISCONTINUED | OUTPATIENT
Start: 2020-11-18 | End: 2020-11-18 | Stop reason: HOSPADM

## 2020-11-18 RX ORDER — ACETAMINOPHEN 325 MG/1
650 TABLET ORAL ONCE
Status: COMPLETED | OUTPATIENT
Start: 2020-11-18 | End: 2020-11-18

## 2020-11-18 RX ORDER — DIPHENHYDRAMINE HCL 25 MG
50 CAPSULE ORAL ONCE
Status: COMPLETED | OUTPATIENT
Start: 2020-11-18 | End: 2020-11-18

## 2020-11-18 RX ADMIN — HEPARIN SODIUM (PORCINE) LOCK FLUSH IV SOLN 100 UNIT/ML 5 ML: 100 SOLUTION at 08:04

## 2020-11-18 RX ADMIN — ACETAMINOPHEN 650 MG: 325 TABLET ORAL at 08:42

## 2020-11-18 RX ADMIN — HEPARIN SODIUM (PORCINE) LOCK FLUSH IV SOLN 100 UNIT/ML 5 ML: 100 SOLUTION at 11:38

## 2020-11-18 RX ADMIN — Medication 250 ML: at 08:46

## 2020-11-18 RX ADMIN — Medication 50 MG: at 08:42

## 2020-11-18 ASSESSMENT — PAIN SCALES - GENERAL: PAINLEVEL: MODERATE PAIN (5)

## 2020-11-18 NOTE — PROGRESS NOTES
Port needle-one inch left accessed for treatment. Tolerated port access and draw without complaint. Port site scrubbed with Chloraprep for 30 seconds and allowed to dry completely prior to Opsite dressing application. Accessed using sterile technique. [place] tubes drawn-Red gel/Green/Purple tubes. Double signed by patient and RN. See documentation flowsheet. Mariah Paniagua RN, BSN, OCN

## 2020-11-18 NOTE — LETTER
11/18/2020         RE: Shaneka Alvarez  [id number] [address] [address]        Dear Colleague,    Thank you for referring your patient, Shaneka Alvarez, to the [facility]. Please see a copy of my visit note below.    Port needle-one inch left accessed for treatment. Tolerated port access and draw without complaint. Port site scrubbed with Chloraprep for 30 seconds and allowed to dry completely prior to Opsite dressing application. Accessed using sterile technique. [place] tubes drawn-Red gel/Green/Purple tubes. Double signed by patient and RN. See documentation flowsheet. Mariah Paniagua, RN, BSN, OCN          Again, thank you for allowing me to participate in the care of your patient.        Sincerely,        Only Cancer Nurse

## 2020-11-18 NOTE — PROGRESS NOTES
Infusion Nursing Note:  Shaneka Alvarez presents today for C1 D8 Trodelvy.    Patient seen by provider today: No   present during visit today: Not Applicable.    Note: Patient stated that she started to feel awful about 2 days post chemo. Stated she had a horrible headache, ongoing nausea and even vomiting.   Used her compazine which helped, but did not start taking it soon enough of. Going to pickup zofran on her way out today and use that this week.     Trodevly infused over 1 hour today after discussion with patient about 1 hr vs 2 hours. Oral benadryl given today as well.         Intravenous Access:  Implanted Port.    Treatment Conditions:  Lab Results   Component Value Date    HGB 11.7 11/18/2020     Lab Results   Component Value Date    WBC 3.9 11/18/2020      Lab Results   Component Value Date    ANEU 2.7 11/18/2020     Lab Results   Component Value Date     11/18/2020      Lab Results   Component Value Date     11/18/2020                   Lab Results   Component Value Date    POTASSIUM 4.0 11/18/2020           Lab Results   Component Value Date    MAG 2.1 11/18/2020            Lab Results   Component Value Date    CR PENDING 11/18/2020                   Lab Results   Component Value Date    RAFAELA PENDING 11/18/2020                Lab Results   Component Value Date    BILITOTAL PENDING 11/18/2020           Lab Results   Component Value Date    ALBUMIN PENDING 11/18/2020                    Lab Results   Component Value Date    ALT PENDING 11/18/2020           Lab Results   Component Value Date    AST PENDING 11/18/2020       Results reviewed, labs MET treatment parameters, ok to proceed with treatment.      Post Infusion Assessment:  Patient tolerated infusion without incident.  Patient observed for 30 minutes post Trodelvy per protocol.  Blood return noted pre and post infusion.  Site patent and intact, free from redness, edema or discomfort.  No evidence of extravasations.  Access  discontinued per protocol.       Discharge Plan:   Discharge instructions reviewed with: Patient.  Patient and/or family verbalized understanding of discharge instructions and all questions answered.  Patient discharged in stable condition accompanied by: self.  Departure Mode: Ambulatory.    Yanci Kate RN

## 2020-11-27 NOTE — PROGRESS NOTES
Palliative Care Outpatient Clinic Progress Note    Patient Name: Shaneka Alvarez is being evaluated via a billable video visit.    Primary Provider:  Mohit Barcenas  Primary Oncologist: Dr Zepeda  Last seen by palliative care: myself, 10/26/20      Chief Complaint: more pain    Medical History/Summary:  - breast cancer ER+/MI+, Her2 neg diagnosed in 2006              - s/p systemic treatment with adriamycin, cytoxan, avastin, aromatase inhibitor as well as b/l mastectomy              - relapsed metastatic disease found in skull, vertebrae complicated by pathological fractures L1, L5              - s/p XRT to T12-L5 Sept 2019. Didn't tolerate Xeloda, paclitaxel Nov 2019-Jan 2020, treated with eribulin and zometa. PD seen on PET Nov 2020 as well as a new L 4th rib concerning for a pathologic fracture. Started Trodelvy 11/11/20  - b/l PE found on PET July 2020, on anticoagulation  - MS with mild right-sided weakness       Interim History:  On her last visit we discussed her back pain (no adjustments)    Patient is on the call by herself    She had increased pain when she initially switched her chemotherapy, but has since seen some improvement in her pain.     She has been taking 30mg MSIR, once per day over the weekend. She hasn't taken any long-acting for about 2 weeks. In the past few days she had periods when she toughed things out, since she tried cutting down on the morphine. She guesses that about half of the days she doesn't need any MSIR in the morning.     No issues with constipation.    No concern for cognitive opioid side-effects.    She has significant nausea and vomiting the Thursday and Friday after her chemo. She has been taking zofran, compazine, and lorazepam. She used to take zofran ODT with good success, but hasn't had an effect of the pill at this time.     Social History:  Pertinent changes to social history/social situation since last visit: no changes    Key support resources:     Advance  Verbal permission granted by patient to leave a detailed message with medical information at phone number listed in [phone]. yes    Patient is male    Pt is here today with Self    HISTORY OF PRESENT ILLNESS: Jazmin Berry is a 66year old White male who is here for a follow up for Follow-up (psoriasis). Patient presents today to follow up in regards to Psoriasis. He is doing UVB treatment twice weekly. Patient reports that he has noticed significant improvement. Camille Quach MA  1/31/2019 10:00 AM  Signed  Jazmin Berry is a 66year old male presenting for   Chief Complaint   Patient presents with   â¢ Follow-up     psoriasis     Reports Latex allergy or sensitivity. Medication verified and med list updated  Refills needed today: No    Medical Assistant/Nurse notes reviewed and accepted. PHYSICAL EXAM:    General:  Patient is a well developed, well nourished and healthy appearing, slightly overweight,  male in no acute distress  and appears of stated age. Skin:   Examination of the scalp/body hair, face, neck, ears, eyelids/conjunctivae, lips, chest, back, abdomen, right upper extremity, and left upper extremity was performed and findings were within normal limits unless specified below. There are residual, flatter, pink plaques with minimal scaling involving the extensor elbows bilaterally and many patches of PIH involving the dorsal hands and fingers, suprapubic abdomen, the lower back, and intergluteal crease. ASSESSMENT:  1. Psoriasis, nearly completely resolved and under excellent control with UVB treatment. PLAN:  1. Continue UVB twice weekly for one month. Treatment given today. We discussed plan for reducing treatments to maintenance once optimal clearing is achieved (max 880 mjs, stay at 880 mjs for one week, then start diminishing treatments to once weekly and reducing the mjs by 20). 2. Follow up 1 month.     Contact office sooner if condition is worsening or with Directive Status:   POLST completed 20: DNR/selective treatments    Social History     Tobacco Use     Smoking status: Former Smoker     Types: Cigarettes     Quit date: 2005     Years since quitting: 15.0     Smokeless tobacco: Never Used   Substance Use Topics     Alcohol use: Yes     Alcohol/week: 2.0 standard drinks     Types: 1 Glasses of wine, 1 Cans of beer per week     Comment: TWICE A WEEK     Drug use: No           Impression & Recommendations & Counseliny/o woman with metastatic breast cancer, recently started Trodelvy    Pain: remains in lower back, likely cancer-related due to metastatic disease  - MS Contin 15mg HS  - MSIR 15-30mg q3h prn  - await effect of Trodelvy    Nausea: chemo-induced  - zofran ODT, discussed recommendation to schedule this on the days she has significant nausea  - continue compazine prn and lorazepam prn anticipatory nausea    Return to clinic in 2-3 months    Data / Chart Review:    Review of Systems:   ROS: 10 point ROS neg other than the symptoms noted above in the HPI and pertinents here.         Physical Exam:   Physical Exam:  Vital Signs not checked, virtual visit    CONSTIT: awake, appears comfortable  EENT: MMM, EOMI, no icterus  RESP: reg, nl effort, no cough  SKIN: no rash, no obvious lesions  NEURO: alert, oriented x3  PSYCH: appropriate affect, memory and thought process intact    The rest of a comprehensive physical examination is deferred  due to public health emergency video visit restrictions.      Allergies   Allergen Reactions     Bactrim [Sulfamethoxazole W/Trimethoprim]      Internal bleeding     Copaxone [Glatiramer] Hives       Current pertinent medications:  MS Contin 15mg HS  MSIR 15-30mg q3h prn              Naloxone prescribed  acetaminophen 1000mg tid  Ibuprofen 600mg qid prn  Diclofenac gel, lidocaine patch     Dexamethasone 4mg q12h     Venlafaxine 225mg daily  Lorazepam 0.5mg prn  Trazodone 50mg HS  Buspirone 15mg bid     Senna bid  new concerns. On 1/31/2019, Lupe SHELTON MA scribed the services personally performed by Griselda Acevedo MD     The documentation recorded by the scribe accurately and completely reflects the service(s) Griselda SHELTON MD, personally performed and the decisions made by me. prn, miralax bid prn     : ok     Opioid safety assessment:   Expected prognosis >1 year  Risk: Low (ORT 0)  Indication for Opioid Use: Moderate or Strong  Baseline UDT performed on: not sent yet  Naloxone Script provided if patient also on benzodiazepine: 12/4/2019   Indications for Tapering reviewed: yes,3/26/20    Past Medical History:   Diagnosis Date     Breast cancer (H)     Age 44     Common migraine      H/O bilateral mastectomy      Mild major depression (H)      Multiple sclerosis (H)      Past Surgical History:   Procedure Laterality Date     ENDOBRONCHIAL ULTRASOUND FLEXIBLE N/A 9/5/2019    Procedure: Flexible Bronchoscopy, Endobronchial Ultrasound, Radial Probe;  Surgeon: Sree Sanchez MD;  Location: [facility] OR      REMOVAL OF OVARY/TUBE(S)       HERNIA REPAIR, UMBILICAL       mastectomy  Bilateral 2006     MASTECTOMY, BILATERAL           Lab and imaging data reviewed:  Comments:           Video-Visit Details    Type of service:  Video Visit    Physician has received verbal consent for a Video Visit from the patient? Yes    Video Start Time: 1015    Video End Time (time video stopped): 10:34 AM      Originating Location (pt. Location): Home    Distant Location (provider location):  [facility]     Mode of Communication:  Video Conference via [facility]    Alva Whitaker MD  Palliative Medicine  Pager [phone]

## 2020-11-30 ENCOUNTER — VIRTUAL VISIT (OUTPATIENT)
Dept: ONCOLOGY | Facility: CLINIC | Age: 60
End: 2020-11-30
Payer: COMMERCIAL

## 2020-11-30 VITALS — BODY MASS INDEX: 22.86 KG/M2 | WEIGHT: 125 LBS

## 2020-11-30 DIAGNOSIS — T50.905A DRUG-INDUCED NAUSEA AND VOMITING: Primary | ICD-10-CM

## 2020-11-30 DIAGNOSIS — G89.3 CANCER ASSOCIATED PAIN: ICD-10-CM

## 2020-11-30 DIAGNOSIS — R11.2 DRUG-INDUCED NAUSEA AND VOMITING: Primary | ICD-10-CM

## 2020-11-30 PROCEDURE — 99214 OFFICE O/P EST MOD 30 MIN: CPT | Mod: GT | Performed by: HOSPITALIST

## 2020-11-30 RX ORDER — MORPHINE SULFATE 15 MG/1
15 TABLET, FILM COATED, EXTENDED RELEASE ORAL EVERY EVENING
Qty: 30 TABLET | Refills: 0 | Status: SHIPPED | OUTPATIENT
Start: 2020-11-30 | End: 2020-12-22

## 2020-11-30 RX ORDER — ONDANSETRON 8 MG/1
8 TABLET, ORALLY DISINTEGRATING ORAL EVERY 8 HOURS PRN
Qty: 70 TABLET | Refills: 3 | Status: SHIPPED | OUTPATIENT
Start: 2020-11-30 | End: 2021-01-05

## 2020-11-30 RX ORDER — MORPHINE SULFATE 15 MG/1
15-30 TABLET ORAL
Qty: 180 TABLET | Refills: 0 | Status: SHIPPED | OUTPATIENT
Start: 2020-11-30 | End: 2021-02-05

## 2020-11-30 ASSESSMENT — PAIN SCALES - GENERAL: PAINLEVEL: SEVERE PAIN (6)

## 2020-11-30 NOTE — LETTER
11/30/2020         RE: Shaneka Alvarez  [id number] [facility] [address]        Dear Colleague,    Thank you for referring your patient, Shaneka Alvarez, to the [facility]. Please see a copy of my visit note below.    Palliative Care Outpatient Clinic Progress Note    Patient Name: Shaneka Alvarez is being evaluated via a billable video visit.    Primary Provider:  Mohit Barcenas  Primary Oncologist: Dr Zepeda  Last seen by palliative care: myself, 10/26/20      Chief Complaint: more pain    Medical History/Summary:  - breast cancer ER+/MA+, Her2 neg diagnosed in 2006              - s/p systemic treatment with adriamycin, cytoxan, avastin, aromatase inhibitor as well as b/l mastectomy              - relapsed metastatic disease found in skull, vertebrae complicated by pathological fractures L1, L5              - s/p XRT to T12-L5 Sept 2019. Didn't tolerate Xeloda, paclitaxel Nov 2019-Jan 2020, treated with eribulin and zometa. PD seen on PET Nov 2020 as well as a new L 4th rib concerning for a pathologic fracture. Started Trodelvy 11/11/20  - b/l PE found on PET July 2020, on anticoagulation  - MS with mild right-sided weakness       Interim History:  On her last visit we discussed her back pain (no adjustments)    Patient is on the call by herself    She had increased pain when she initially switched her chemotherapy, but has since seen some improvement in her pain.     She has been taking 30mg MSIR, once per day over the weekend. She hasn't taken any long-acting for about 2 weeks. In the past few days she had periods when she toughed things out, since she tried cutting down on the morphine. She guesses that about half of the days she doesn't need any MSIR in the morning.     No issues with constipation.    No concern for cognitive opioid side-effects.    She has significant nausea and vomiting the Thursday and Friday after her chemo. She has been taking zofran,  compazine, and lorazepam. She used to take zofran ODT with good success, but hasn't had an effect of the pill at this time.     Social History:  Pertinent changes to social history/social situation since last visit: no changes    Key support resources:     Advance Directive Status:   POLST completed 20: DNR/selective treatments    Social History     Tobacco Use     Smoking status: Former Smoker     Types: Cigarettes     Quit date: 2005     Years since quitting: 15.0     Smokeless tobacco: Never Used   Substance Use Topics     Alcohol use: Yes     Alcohol/week: 2.0 standard drinks     Types: 1 Glasses of wine, 1 Cans of beer per week     Comment: TWICE A WEEK     Drug use: No           Impression & Recommendations & Counseliny/o woman with metastatic breast cancer, recently started Trodelvy    Pain: remains in lower back, likely cancer-related due to metastatic disease  - MS Contin 15mg HS  - MSIR 15-30mg q3h prn  - await effect of Trodelvy    Nausea: chemo-induced  - zofran ODT, discussed recommendation to schedule this on the days she has significant nausea  - continue compazine prn and lorazepam prn anticipatory nausea    Return to clinic in 2-3 months    Data / Chart Review:    Review of Systems:   ROS: 10 point ROS neg other than the symptoms noted above in the HPI and pertinents here.         Physical Exam:   Physical Exam:  Vital Signs not checked, virtual visit    CONSTIT: awake, appears comfortable  EENT: MMM, EOMI, no icterus  RESP: reg, nl effort, no cough  SKIN: no rash, no obvious lesions  NEURO: alert, oriented x3  PSYCH: appropriate affect, memory and thought process intact    The rest of a comprehensive physical examination is deferred  due to public health emergency video visit restrictions.      Allergies   Allergen Reactions     Bactrim [Sulfamethoxazole W/Trimethoprim]      Internal bleeding     Copaxone [Glatiramer] Hives       Current pertinent medications:  MS Contin 15mg  HS  MSIR 15-30mg q3h prn              Naloxone prescribed  acetaminophen 1000mg tid  Ibuprofen 600mg qid prn  Diclofenac gel, lidocaine patch     Dexamethasone 4mg q12h     Venlafaxine 225mg daily  Lorazepam 0.5mg prn  Trazodone 50mg HS  Buspirone 15mg bid     Senna bid prn, miralax bid prn     : ok     Opioid safety assessment:   Expected prognosis >1 year  Risk: Low (ORT 0)  Indication for Opioid Use: Moderate or Strong  Baseline UDT performed on: not sent yet  Naloxone Script provided if patient also on benzodiazepine: 12/4/2019   Indications for Tapering reviewed: yes,3/26/20    Past Medical History:   Diagnosis Date     Breast cancer (H)     Age 44     Common migraine      H/O bilateral mastectomy      Mild major depression (H)      Multiple sclerosis (H)      Past Surgical History:   Procedure Laterality Date     ENDOBRONCHIAL ULTRASOUND FLEXIBLE N/A 9/5/2019    Procedure: Flexible Bronchoscopy, Endobronchial Ultrasound, Radial Probe;  Surgeon: Sree Sanchez MD;  Location: [facility] OR      REMOVAL OF OVARY/TUBE(S)       HERNIA REPAIR, UMBILICAL       mastectomy  Bilateral 2006     MASTECTOMY, BILATERAL           Lab and imaging data reviewed:  Comments:           Video-Visit Details    Type of service:  Video Visit    Physician has received verbal consent for a Video Visit from the patient? Yes    Video Start Time: 1015    Video End Time (time video stopped): 10:34 AM      Originating Location (pt. Location): Home    Distant Location (provider location):  [facility]     Mode of Communication:  Video Conference via Mobile365 (fka In[employer])    Alva Whitaker MD  Palliative Medicine  Pager [phone]      Again, thank you for allowing me to participate in the care of your patient.        Sincerely,        Alva Whitaker MD

## 2020-11-30 NOTE — PATIENT INSTRUCTIONS
Patient Instructions      Expand All Collapse All    Thank you for attending the Palliative Care Clinic appointment today.     1. Pain:  - take MS Contin 15mg at night  - take short-acting morphine 15-30mg up to every 3 hours as needed    2. Nausea:  - take Zofran (ondansetron) 8mg up to three times a day.          - Take one on Wednesday evening, then three times a day on Thursday as well as on Friday. On Saturday you can go back to taking it as needed.  - take Compazine 10mg up to every 6 hours as needed  - take lorazepam as needed. This is only helpful for so-called anticipatory nausea    Call if your symptoms change and the medications we have prescribed are not working.   Do not take your medications at any other dose or frequently than how they are prescribed.   I have prescribed naloxone as a rescue medication for the opioids (pain pills), which I do for all of my patients who have these medications at home.    Call us at least 3 workdays in advance if you need a medication refill.    Please have all your pill bottles ready for your next appointment.     Failure to follow these instructions may result in the palliative care team not being able to prescribe your medications.    Return to clinic in 2-3 months for a follow up.     You can reach the Palliative Care Team during business hours at the following number:    - [phone]    To reach the Palliative Care Provider on-call After-hours or on holidays and weekends, call: [phone].  Please note that we are not able to provide pain medication refills on evenings or weekends.

## 2020-11-30 NOTE — NURSING NOTE
"Shaneka Alvarez is a 60 year old female who is being evaluated via a billable video visit.      The patient has been notified of following:     \"This video visit will be conducted via a call between you and your physician/provider. We have found that certain health care needs can be provided without the need for an in-person physical exam.  This service lets us provide the care you need with a video conversation.  If a prescription is necessary we can send it directly to your pharmacy.  If lab work is needed we can place an order for that and you can then stop by our lab to have the test done at a later time.    Video visits are billed at different rates depending on your insurance coverage.  Please reach out to your insurance provider with any questions.    If during the course of the call the physician/provider feels a video visit is not appropriate, you will not be charged for this service.\"    Patient has given verbal consent for Video visit? Yes  How would you like to obtain your AVS? MyChart  If you are dropped from the video visit, the video invite should be resent to: Text to cell phone: [phone]  Will anyone else be joining your video visit? No          "

## 2020-12-02 ENCOUNTER — ONCOLOGY VISIT (OUTPATIENT)
Dept: ONCOLOGY | Facility: CLINIC | Age: 60
End: 2020-12-02
Payer: COMMERCIAL

## 2020-12-02 ENCOUNTER — INFUSION THERAPY VISIT (OUTPATIENT)
Dept: INFUSION THERAPY | Facility: CLINIC | Age: 60
End: 2020-12-02
Payer: COMMERCIAL

## 2020-12-02 ENCOUNTER — VIRTUAL VISIT (OUTPATIENT)
Dept: ONCOLOGY | Facility: CLINIC | Age: 60
End: 2020-12-02
Attending: INTERNAL MEDICINE
Payer: COMMERCIAL

## 2020-12-02 VITALS
HEART RATE: 75 BPM | WEIGHT: 137.1 LBS | SYSTOLIC BLOOD PRESSURE: 113 MMHG | OXYGEN SATURATION: 99 % | DIASTOLIC BLOOD PRESSURE: 78 MMHG | TEMPERATURE: 98.2 F | BODY MASS INDEX: 25.08 KG/M2

## 2020-12-02 DIAGNOSIS — D70.1 CHEMOTHERAPY-INDUCED NEUTROPENIA (H): ICD-10-CM

## 2020-12-02 DIAGNOSIS — C50.911 BILATERAL MALIGNANT NEOPLASM OF BREAST IN FEMALE, UNSPECIFIED ESTROGEN RECEPTOR STATUS, UNSPECIFIED SITE OF BREAST (H): ICD-10-CM

## 2020-12-02 DIAGNOSIS — E87.6 HYPOKALEMIA: ICD-10-CM

## 2020-12-02 DIAGNOSIS — C50.919 METASTATIC BREAST CANCER: ICD-10-CM

## 2020-12-02 DIAGNOSIS — C50.912 BILATERAL MALIGNANT NEOPLASM OF BREAST IN FEMALE, UNSPECIFIED ESTROGEN RECEPTOR STATUS, UNSPECIFIED SITE OF BREAST (H): ICD-10-CM

## 2020-12-02 DIAGNOSIS — D70.1 CHEMOTHERAPY-INDUCED NEUTROPENIA (H): Primary | ICD-10-CM

## 2020-12-02 DIAGNOSIS — T45.1X5A CHEMOTHERAPY-INDUCED NEUTROPENIA (H): Primary | ICD-10-CM

## 2020-12-02 DIAGNOSIS — E87.6 HYPOKALEMIA: Primary | ICD-10-CM

## 2020-12-02 DIAGNOSIS — T45.1X5A CHEMOTHERAPY-INDUCED NEUTROPENIA (H): ICD-10-CM

## 2020-12-02 LAB
ALBUMIN SERPL-MCNC: 3.2 G/DL (ref 3.4–5)
ALP SERPL-CCNC: 97 U/L (ref 40–150)
ALT SERPL W P-5'-P-CCNC: 40 U/L (ref 0–50)
ANION GAP SERPL CALCULATED.3IONS-SCNC: 3 MMOL/L (ref 3–14)
AST SERPL W P-5'-P-CCNC: 28 U/L (ref 0–45)
BASOPHILS # BLD AUTO: 0 10E9/L (ref 0–0.2)
BASOPHILS NFR BLD AUTO: 0.6 %
BILIRUB SERPL-MCNC: 0.4 MG/DL (ref 0.2–1.3)
BUN SERPL-MCNC: 9 MG/DL (ref 7–30)
CALCIUM SERPL-MCNC: 8.2 MG/DL (ref 8.5–10.1)
CANCER AG27-29 SERPL-ACNC: 396 U/ML (ref 0–39)
CHLORIDE SERPL-SCNC: 106 MMOL/L (ref 94–109)
CO2 SERPL-SCNC: 28 MMOL/L (ref 20–32)
CREAT SERPL-MCNC: 0.51 MG/DL (ref 0.52–1.04)
DIFFERENTIAL METHOD BLD: ABNORMAL
EOSINOPHIL # BLD AUTO: 0.1 10E9/L (ref 0–0.7)
EOSINOPHIL NFR BLD AUTO: 3.5 %
ERYTHROCYTE [DISTWIDTH] IN BLOOD BY AUTOMATED COUNT: 15.4 % (ref 10–15)
GFR SERPL CREATININE-BSD FRML MDRD: >90 ML/MIN/{1.73_M2}
GLUCOSE SERPL-MCNC: 99 MG/DL (ref 70–99)
HCT VFR BLD AUTO: 36.3 % (ref 35–47)
HGB BLD-MCNC: 11.4 G/DL (ref 11.7–15.7)
IMM GRANULOCYTES # BLD: 0 10E9/L (ref 0–0.4)
IMM GRANULOCYTES NFR BLD: 0.6 %
LYMPHOCYTES # BLD AUTO: 0.4 10E9/L (ref 0.8–5.3)
LYMPHOCYTES NFR BLD AUTO: 12.3 %
MAGNESIUM SERPL-MCNC: 2.1 MG/DL (ref 1.6–2.3)
MCH RBC QN AUTO: 27.7 PG (ref 26.5–33)
MCHC RBC AUTO-ENTMCNC: 31.4 G/DL (ref 31.5–36.5)
MCV RBC AUTO: 88 FL (ref 78–100)
MONOCYTES # BLD AUTO: 0.5 10E9/L (ref 0–1.3)
MONOCYTES NFR BLD AUTO: 16.1 %
NEUTROPHILS # BLD AUTO: 2.1 10E9/L (ref 1.6–8.3)
NEUTROPHILS NFR BLD AUTO: 66.9 %
PHOSPHATE SERPL-MCNC: 2.7 MG/DL (ref 2.5–4.5)
PLATELET # BLD AUTO: 168 10E9/L (ref 150–450)
POTASSIUM SERPL-SCNC: 3.9 MMOL/L (ref 3.4–5.3)
PROT SERPL-MCNC: 6.6 G/DL (ref 6.8–8.8)
RBC # BLD AUTO: 4.12 10E12/L (ref 3.8–5.2)
SODIUM SERPL-SCNC: 137 MMOL/L (ref 133–144)
WBC # BLD AUTO: 3.1 10E9/L (ref 4–11)

## 2020-12-02 PROCEDURE — 86300 IMMUNOASSAY TUMOR CA 15-3: CPT | Performed by: INTERNAL MEDICINE

## 2020-12-02 PROCEDURE — 99207 PR NO CHARGE LOS: CPT

## 2020-12-02 PROCEDURE — 80053 COMPREHEN METABOLIC PANEL: CPT | Performed by: INTERNAL MEDICINE

## 2020-12-02 PROCEDURE — 84100 ASSAY OF PHOSPHORUS: CPT | Performed by: INTERNAL MEDICINE

## 2020-12-02 PROCEDURE — 99214 OFFICE O/P EST MOD 30 MIN: CPT | Mod: GT | Performed by: INTERNAL MEDICINE

## 2020-12-02 PROCEDURE — 96413 CHEMO IV INFUSION 1 HR: CPT | Performed by: NURSE PRACTITIONER

## 2020-12-02 PROCEDURE — 96367 TX/PROPH/DG ADDL SEQ IV INF: CPT | Performed by: NURSE PRACTITIONER

## 2020-12-02 PROCEDURE — 85025 COMPLETE CBC W/AUTO DIFF WBC: CPT | Performed by: INTERNAL MEDICINE

## 2020-12-02 PROCEDURE — 96375 TX/PRO/DX INJ NEW DRUG ADDON: CPT | Performed by: NURSE PRACTITIONER

## 2020-12-02 PROCEDURE — S0028 INJECTION, FAMOTIDINE, 20 MG: HCPCS | Performed by: NURSE PRACTITIONER

## 2020-12-02 PROCEDURE — 83735 ASSAY OF MAGNESIUM: CPT | Performed by: INTERNAL MEDICINE

## 2020-12-02 RX ORDER — ACETAMINOPHEN 325 MG/1
650 TABLET ORAL ONCE
Status: CANCELLED | OUTPATIENT
Start: 2020-12-02

## 2020-12-02 RX ORDER — HEPARIN SODIUM (PORCINE) LOCK FLUSH IV SOLN 100 UNIT/ML 100 UNIT/ML
5 SOLUTION INTRAVENOUS
Status: CANCELLED | OUTPATIENT
Start: 2020-12-02

## 2020-12-02 RX ORDER — METHYLPREDNISOLONE SODIUM SUCCINATE 125 MG/2ML
125 INJECTION, POWDER, LYOPHILIZED, FOR SOLUTION INTRAMUSCULAR; INTRAVENOUS
Status: CANCELLED
Start: 2020-12-08

## 2020-12-02 RX ORDER — DIPHENHYDRAMINE HCL 25 MG
50 CAPSULE ORAL ONCE
Status: CANCELLED | OUTPATIENT
Start: 2020-12-08

## 2020-12-02 RX ORDER — ACETAMINOPHEN 325 MG/1
650 TABLET ORAL ONCE
Status: CANCELLED | OUTPATIENT
Start: 2020-12-08

## 2020-12-02 RX ORDER — HEPARIN SODIUM (PORCINE) LOCK FLUSH IV SOLN 100 UNIT/ML 100 UNIT/ML
5 SOLUTION INTRAVENOUS
Status: DISCONTINUED | OUTPATIENT
Start: 2020-12-02 | End: 2020-12-02 | Stop reason: HOSPADM

## 2020-12-02 RX ORDER — ALBUTEROL SULFATE 0.83 MG/ML
2.5 SOLUTION RESPIRATORY (INHALATION)
Status: CANCELLED | OUTPATIENT
Start: 2020-12-02

## 2020-12-02 RX ORDER — DIPHENHYDRAMINE HYDROCHLORIDE 50 MG/ML
50 INJECTION INTRAMUSCULAR; INTRAVENOUS
Status: CANCELLED
Start: 2020-12-08

## 2020-12-02 RX ORDER — ACETAMINOPHEN 325 MG/1
650 TABLET ORAL ONCE
Status: COMPLETED | OUTPATIENT
Start: 2020-12-02 | End: 2020-12-02

## 2020-12-02 RX ORDER — LORAZEPAM 2 MG/ML
0.5 INJECTION INTRAMUSCULAR EVERY 4 HOURS PRN
Status: CANCELLED
Start: 2020-12-08

## 2020-12-02 RX ORDER — METHYLPREDNISOLONE SODIUM SUCCINATE 125 MG/2ML
125 INJECTION, POWDER, LYOPHILIZED, FOR SOLUTION INTRAMUSCULAR; INTRAVENOUS
Status: CANCELLED
Start: 2020-12-02

## 2020-12-02 RX ORDER — MEPERIDINE HYDROCHLORIDE 25 MG/ML
25 INJECTION INTRAMUSCULAR; INTRAVENOUS; SUBCUTANEOUS EVERY 30 MIN PRN
Status: CANCELLED | OUTPATIENT
Start: 2020-12-02

## 2020-12-02 RX ORDER — EPINEPHRINE 1 MG/ML
0.3 INJECTION, SOLUTION INTRAMUSCULAR; SUBCUTANEOUS EVERY 5 MIN PRN
Status: CANCELLED | OUTPATIENT
Start: 2020-12-02

## 2020-12-02 RX ORDER — DIPHENHYDRAMINE HCL 25 MG
50 CAPSULE ORAL ONCE
Status: COMPLETED | OUTPATIENT
Start: 2020-12-02 | End: 2020-12-02

## 2020-12-02 RX ORDER — DIPHENHYDRAMINE HYDROCHLORIDE 50 MG/ML
50 INJECTION INTRAMUSCULAR; INTRAVENOUS
Status: CANCELLED
Start: 2020-12-02

## 2020-12-02 RX ORDER — ATROPINE SULFATE 0.4 MG/ML
0.4 AMPUL (ML) INJECTION
Status: CANCELLED | OUTPATIENT
Start: 2020-12-02

## 2020-12-02 RX ORDER — ALBUTEROL SULFATE 0.83 MG/ML
2.5 SOLUTION RESPIRATORY (INHALATION)
Status: CANCELLED | OUTPATIENT
Start: 2020-12-08

## 2020-12-02 RX ORDER — ATROPINE SULFATE 0.4 MG/ML
0.4 AMPUL (ML) INJECTION
Status: CANCELLED | OUTPATIENT
Start: 2020-12-08

## 2020-12-02 RX ORDER — HEPARIN SODIUM,PORCINE 10 UNIT/ML
5 VIAL (ML) INTRAVENOUS
Status: CANCELLED | OUTPATIENT
Start: 2020-12-08

## 2020-12-02 RX ORDER — ALBUTEROL SULFATE 90 UG/1
1-2 AEROSOL, METERED RESPIRATORY (INHALATION)
Status: CANCELLED
Start: 2020-12-08

## 2020-12-02 RX ORDER — NALOXONE HYDROCHLORIDE 0.4 MG/ML
.1-.4 INJECTION, SOLUTION INTRAMUSCULAR; INTRAVENOUS; SUBCUTANEOUS
Status: CANCELLED | OUTPATIENT
Start: 2020-12-02

## 2020-12-02 RX ORDER — SODIUM CHLORIDE 9 MG/ML
1000 INJECTION, SOLUTION INTRAVENOUS CONTINUOUS PRN
Status: CANCELLED
Start: 2020-12-02

## 2020-12-02 RX ORDER — SODIUM CHLORIDE 9 MG/ML
1000 INJECTION, SOLUTION INTRAVENOUS CONTINUOUS PRN
Status: CANCELLED
Start: 2020-12-08

## 2020-12-02 RX ORDER — HEPARIN SODIUM,PORCINE 10 UNIT/ML
5 VIAL (ML) INTRAVENOUS
Status: CANCELLED | OUTPATIENT
Start: 2020-12-02

## 2020-12-02 RX ORDER — MEPERIDINE HYDROCHLORIDE 25 MG/ML
25 INJECTION INTRAMUSCULAR; INTRAVENOUS; SUBCUTANEOUS EVERY 30 MIN PRN
Status: CANCELLED | OUTPATIENT
Start: 2020-12-08

## 2020-12-02 RX ORDER — LORAZEPAM 2 MG/ML
0.5 INJECTION INTRAMUSCULAR EVERY 4 HOURS PRN
Status: CANCELLED
Start: 2020-12-02

## 2020-12-02 RX ORDER — HEPARIN SODIUM (PORCINE) LOCK FLUSH IV SOLN 100 UNIT/ML 100 UNIT/ML
5 SOLUTION INTRAVENOUS
Status: CANCELLED | OUTPATIENT
Start: 2020-12-08

## 2020-12-02 RX ORDER — DIPHENHYDRAMINE HCL 25 MG
50 CAPSULE ORAL ONCE
Status: CANCELLED | OUTPATIENT
Start: 2020-12-02

## 2020-12-02 RX ORDER — ALBUTEROL SULFATE 90 UG/1
1-2 AEROSOL, METERED RESPIRATORY (INHALATION)
Status: CANCELLED
Start: 2020-12-02

## 2020-12-02 RX ORDER — EPINEPHRINE 1 MG/ML
0.3 INJECTION, SOLUTION INTRAMUSCULAR; SUBCUTANEOUS EVERY 5 MIN PRN
Status: CANCELLED | OUTPATIENT
Start: 2020-12-08

## 2020-12-02 RX ORDER — NALOXONE HYDROCHLORIDE 0.4 MG/ML
.1-.4 INJECTION, SOLUTION INTRAMUSCULAR; INTRAVENOUS; SUBCUTANEOUS
Status: CANCELLED | OUTPATIENT
Start: 2020-12-08

## 2020-12-02 RX ADMIN — Medication 50 MG: at 10:04

## 2020-12-02 RX ADMIN — Medication 250 ML: at 10:09

## 2020-12-02 RX ADMIN — HEPARIN SODIUM (PORCINE) LOCK FLUSH IV SOLN 100 UNIT/ML 5 ML: 100 SOLUTION at 09:16

## 2020-12-02 RX ADMIN — ACETAMINOPHEN 650 MG: 325 TABLET ORAL at 10:04

## 2020-12-02 RX ADMIN — HEPARIN SODIUM (PORCINE) LOCK FLUSH IV SOLN 100 UNIT/ML 5 ML: 100 SOLUTION at 12:42

## 2020-12-02 ASSESSMENT — PAIN SCALES - GENERAL: PAINLEVEL: NO PAIN (0)

## 2020-12-02 NOTE — PROGRESS NOTES
"Oncology Rooming Note    December 2, 2020 9:21 AM   Shaneka Alvarez is a 60 year old female who presents for:    Chief Complaint   Patient presents with     Oncology Clinic Visit     Initial Vitals: There were no vitals taken for this visit. Estimated body mass index is 22.86 kg/m  as calculated from the following:    Height as of 4/7/20: 1.575 m (5' 2\").    Weight as of 11/30/20: 56.7 kg (125 lb). There is no height or weight on file to calculate BSA.  Data Unavailable Comment: Data Unavailable   No LMP recorded. Patient is postmenopausal.  Allergies reviewed: Yes  Medications reviewed: Yes    Medications: Medication refills not needed today.  Pharmacy name entered into [facility]:    [id number] - [facility] [address] [id number] - [place], [place] - [address] [facility] [address] [facility] [address]  [facility] - [place], [place] - [address] [facility] - [facility] [address] [place] [address]    Clinical concerns: wants to change appts to Monday or Tuesday Dr Zepeda was notified.      Javier Roland RN              "

## 2020-12-02 NOTE — PROGRESS NOTES
"Patient's name and  were verified.  See Doc Flowsheet - IV assess for details.  IVAD accessed with 20G 3/4\" mcnair gripper plus needle  blood return positive: YES  Site without redness, tenderness or swelling: YES  flushed with 30cc NS and 5cc 100u/ml heparin  Needle: left accessed for chemotherapy  Comments: Labs drawn.  Patient tolerated procedure without incident.    Javier Roland  RN, BSN      "

## 2020-12-02 NOTE — PROGRESS NOTES
ONCOLOGY FOLLOW UP NOTE: 12/2/2020        I took the history from reviewing the previous notes that she was following with Dr. Amaya.  I have copied and updated from prior notes.    ONCOLOGY History:    1.  January 2006:  Diagnosed with Stage IIB, T2 N1 M0 invasive lobular carcinoma of the right breast.  Final pathology showed a 4.5 x 3.8 x 2.5 cm, 01/14 lymph nodes positive.  Estrogen, progesterone receptor positive, HER-2 negative.     2.  Genetics.  BRCA1 and 2 mutations not detected. 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       THERAPY TO DATE:   1.  2006:  ECOG 2104 protocol of dose-dense Adriamycin, Cytoxan and Avastin x4 cycles followed by Taxol and Avastin x4 cycles.   2.  03/2007:  Completed 1 year Avastin.   3.  11/2006-01/2007:  Radiotherapy to the right breast of 5040 cGy.   4.  03/[id number]:  Aromasin.  Stopped after moving to the [facility].   5.  01/2016:  Right modified radical mastectomy with latissimus dorsi flap reconstruction and a left prophylactic mastectomy with latissimus dorsi flap reconstruction.     She recently had MRI of the brain as a follow-up for multiple sclerosis and there were multiple calvarial lesions noted which was suspicious for metastatic disease.  There was no evidence of new multiple sclerosis lesions.  She had a PET scan on 8/30/2019 which showed widespread bone metastatic lesions (calvarium, spine, sacrum, pelvis, ribs most prominent at C7, T3, L1 and L4), hypermetabolic 3 cm lesion in LLL, and mediastinal/hilar LN. CEA wnl at 1.9 and C27-29 elevated to 415 (28 on 8/23/16). A CT guided biopsy of the right iliac bone was obtained on 9/4/19, pathology consistent with metastatic adenocarcinoma (breast), ER/ID negative, HER-2 negative PDL 1 < 1%. A second biopsy was take of the LLL nodule via EBUS on 9/5/19 did not show any malignancy.    C/T/L spine MRI 8/3/19 to 9/2/19 with numerous enhancing lesions of the C, T and L spine, largest around T9 and L1. No  evidence of cord compression. Has a L1 compression fracture and impending L4.     Received radiation T12-L5 3000 cGy in 10 fractions from 9/6/2019 till 9/18/2019.  Neurosurgery recommended no surgical intervention but to wear [place] brace when out of bed and HOB >30 degrees    She started palliative Xeloda 2000/1500 on 10/1/2019 but after just a few doses she noticed nausea, red eyes blurred vision, loss of appetite.  She stopped taking it after 5 doses and was seen by nurse practitioner on 10/7/2019 when she was improving.  At that point she was restarted on Xeloda at a lower dose of 1000 mg twice a day.  As she was not able to tolerate even the lower dose with extreme nausea and vomiting and feeling extremely fatigued and blurry vision, we decided on stopping Xeloda.    We decided on repeating scans and MRI brain on 10/25/2019 showed no intracranial metastatic disease.   Multiple enhancing calvarial lesions may be increased in number and are suggestive of metastatic disease. Thinner imaging on the current study may identify the smaller lesions and may be responsible for  identified more lesions.   There is a single focus of T2 hyperintense signal within the anterior right temporal lobe may represent interval demyelination. There is no evidence for active demyelination.    PET/CT on 11/1/2019 showed favorable response to therapy and Overall FDG uptake within scattered osseous metastases is decreased since 8/30/2019, particularly within the pelvis. Increased sclerotic appearance of L1 and L4 pathologic compression deformities, likely sequela of recently completed palliative radiation therapy.    No significant FDG uptake in previously demonstrated hypermetabolic mediastinal lymph nodes. Biopsy negative on 9/5/2019.  There is also decreased FDG uptake in the left lower lobe (biopsy negative for  malignancy), now with dense consolidation containing air bronchograms suggestive of infection versus aspiration.  There is  diffuse FDG uptake within the esophagus, consistent with esophagitis.    Because of intolerance to Xeloda we decided on switching to weekly paclitaxel on 11/5/2019.    C#2 Taxol 12/4/19- started with 70mg/m2 dose reduction    C#3 Taxol 12/30/2019     PET/CT on 1/24/2020 showed progression of the disease in some of the bone lesions including increase in size and lytic lesion within the vertebral body of C4 measuring 1 cm as opposed to 0.6 cm previously and a new central soft tissue nodule with SUV max 4.1 when previously it was non-hypermetabolic.  There is also some progression noted in the right proximal femur and right iliac bone.  There is decreased metabolic uptake in the right lamina of T9 with SUV max 4.7 when previously it was 8.0.  Other lesions are stable.    CA 27-29 also had increased significantly and it was 257 on 1/28/2020.    We decided on switching to eribulin on 1/28/2020.    C#2 2/18/2020  C#2 D#8 2/25/2020    C#3 D#1 3/18/2020 -delayed by 1 week as per patient's preference because she wanted to visit her family.    She had a repeat PET/CT on 3/27/2020 which showed stable size of the bone metastatic lesions although the FDG avidity was less, consistent with treatment response.    She had MRI of the thoracic spine on 4/3/2020 and it was compared to the one which was done in August 2019 and it showed increased size of several metastatic lesions most notably at T9 but also at T5-T7.  Other lesions at T10, T11 and T12 appeared improved.    CA 27-29 was also down to 222 on 3/18/2020.    Cycle #4 eribulin ( dose reduced to 1.2 mg/m2 )-4/7/2020-   Cycle #5 Eribulin ( 1.2 mg/m2 )- 4/28/2020   Cycle #6 Eribulin ( 1.2 mg/m2 )- 5/19/2020     Cycle #7 Eribulin ( 1.2 mg/m2 )- 6/9/2020    Cycle #8 Eribulin ( 1.2 mg/m2 )- 6/30/2020     She had a repeat PET scan on 7/17/2020 which showed incidental finding of new acute bilateral pulmonary embolism in the distal left main pulmonary artery extending in the left  upper and lower lobes and in the segmental and branch arteries in the right lower lobe.    It also showed mildly increased FDG avidity in the lytic lesion in the T9 lamina and right pedicle with SUV max 5.3, previously 3.9.  That is also slightly increased FDG uptake to the left anterior fifth rib at the costochondral junction SUV max 3.9, previously 2.5.  There is slightly decreased FDG uptake in the right femoral neck lesion SUV max 2.2, previously 2.9.  FDG uptake in other bone lesions is fairly stable.  No new metastasis are seen.    CA 27-29 was 308 on 6/30/2020.  It was 286 on 5/19/2020.    Overall this is consistent with only slight progression versus stable disease.    She was started on Lovenox.    Cycle #9 eribulin 7/21/2020. CA 27-29 was 238    C#10 eribulin 8/18/2020. ( delayed by one week for ANC 0.9 )    C#11 Eribulin 9/8/2020    C#12 Eribulin 9/29/2020     C#13 Eribulin 10/20/2020     PET scan on 11/6/2020 shows progression of the disease with increased FDG uptake in the lytic lesions in the T9/T10 and right posterolateral elements as well as increased FDG uptake in the previously known hypermetabolic right iliac bone lesion suggesting recurrence of previously treated metastasis.  There is new subtle lesion in the right manubrium.  There is also a new fracture in the anterolateral left fourth rib with associated uptake and this could be a pathologic fracture.  Healing fracture in the left anterior fifth rib lesion.  There is resolution of the left pulmonary emboli.  Decreased FDG uptake of the esophagus consistent with improving inflammation.    CA 27-29 on 1020/2020 was also elevated at 489.    C#1 Trodelvy on 11/11/2020.  Cycle #2 Trodelvy 12/2/2020      After receiving dental clearance, she got Zometa on 10/23/2019 and she is receiving it every 3 months.  Last one was on 9/29/2020.      Switch to Xgeva on 11/11/2020 because of progression of bone metastasis.      She was evaluated by ophthalmology  and was noted to have cystoid macular edema.  It was thought that this could be due to Taxol as patient reported to the ophthalmologist that she was on Taxol in September 2019 when her symptoms started.  But she was not on Taxol in September 2019 when she started noticing the visual changes so Taxol is not responsible for this.  The first dose of Taxol was on 11/5/2019.       Interval history.  This is a video visit.  At the end of the video visit I also went into her room to examine the back of her scalp.  She mentioned that on day 3 and day 4 she had nausea and vomiting with each dose of Trodelvy.  She was taking Compazine every 6 hours.  She recently got sublingual Zofran but she has not tried that.  Otherwise she did not have any diarrhea or constipation.  No fevers or infections.  Overall energy is good.  She has not lost weight.  She denies any difficulty breathing.  Denies any worsening of the neuropathy.  Pain is under decent control and she takes morphine sulfate as needed during the day and MS Contin at night.    She has noticed that there is swelling at the back of the scalp which is probably slightly getting worse and she first noticed it a few weeks ago.  It does not hurt.      ECOG 1    ROS:  Rest of the comprehensive review of the system was essentially unremarkable.        I reviewed other history in epic as below.       PAST MEDICAL HISTORY:     1.  Breast cancer  2.  Multiple sclerosis.   3.  Depression.   4.  Migraines.   5.  Hypertension.   6.  Cholecystectomy and umbilical hernia repair.     SOCIAL HISTORY: She smoked for 5 years but quit many years ago.  Drinks alcohol socially.  She lives with her .  She is a teacher in middle school.       FAMILY HISTORY: She mentions that her mother had breast cancer at 49.  Both grandmothers had breast cancer but she is not sure of the age.  A couple of cousins have cancers.  Patient has 3 children who are healthy.    Current Outpatient Medications  "  Medication     acetaminophen (TYLENOL) 500 MG tablet     apixaban ANTICOAGULANT (ELIQUIS) 5 MG tablet     busPIRone (BUSPAR) 15 MG tablet     calcium citrate-vitamin D (CITRACAL) 315-250 MG-UNIT TABS     Cholecalciferol (VITAMIN D3 PO)     cyclopentolate (CYCLOGYL) 1 % ophthalmic solution     diclofenac (VOLTAREN) 1 % topical gel     ibuprofen (ADVIL/MOTRIN) 600 MG tablet     loperamide (IMODIUM) 2 MG capsule     LORazepam (ATIVAN) 0.5 MG tablet     LORazepam (ATIVAN) 0.5 MG tablet     magnesium 250 MG tablet     morphine (MS CONTIN) 15 MG CR tablet     morphine (MSIR) 15 MG IR tablet     naloxone (NARCAN) 1 mg/mL for intranasal kit (2 syringes with 2 mucosal atomizer device)     ondansetron (ZOFRAN) 8 MG tablet     ondansetron (ZOFRAN-ODT) 8 MG ODT tab     polyethylene glycol (MIRALAX/GLYCOLAX) packet     prochlorperazine (COMPAZINE) 10 MG tablet     propranolol (INDERAL LA) 60 MG 24 hr capsule     senna-docusate (SENOKOT-S/PERICOLACE) 8.6-50 MG tablet     syringe/needle, disp, (B-D INTEGRA SYRINGE) 23G X 1\" 3 ML MISC     traZODone (DESYREL) 50 MG tablet     venlafaxine (EFFEXOR-ER) 225 MG TB24 24 hr tablet     No current facility-administered medications for this visit.      Facility-Administered Medications Ordered in Other Visits   Medication     heparin 100 UNIT/ML injection 5 mL     sodium chloride (PF) 0.9% PF flush 10-20 mL        Allergies   Allergen Reactions     Bactrim [Sulfamethoxazole W/Trimethoprim]      Internal bleeding     Copaxone [Glatiramer] Hives          PHYSICAL EXAMINATION:     /78   Pulse 75   Temp 98.2  F (36.8  C) (Oral)   Wt 62.2 kg (137 lb 1.6 oz)   SpO2 99%   BMI 25.08 kg/m    Wt Readings from Last 4 Encounters:   12/02/20 62.2 kg (137 lb 1.6 oz)   11/30/20 56.7 kg (125 lb)   11/18/20 60.6 kg (133 lb 8 oz)   11/11/20 61.9 kg (136 lb 6.4 oz)           Constitutional.  Does not seem to be in any acute distress.  Eyes.  No redness or discharge noted.  Scalp: There is a " subcutaneous nodule around the occipital region and it feels like a cyst.  A subcutaneous metastatic deposit cannot be excluded.  Respiratory.  Speaking in full sentences.  Breathing seems comfortable without any accessory use of muscles.    Skin.  Visualized his skin does not show any obvious rashes.  Musculoskeletal.  Range of motion for visualized areas is intact.  Neurological.  Alert and oriented x3.  Psychiatric.  Mood, mentation and affect are normal.  Decision making capacity is intact.      The rest of a comprehensive physical examination is deferred due to Public Health Emergency video visit restrictions.        Labs and Imaging.    Reviewed.    Combined Report of:    PET and CT on  11/6/2020 10:44 AM :     1. PET of the neck, chest, abdomen, and pelvis.  2. PET CT Fusion for Attenuation Correction and Anatomical  Localization:    3. Diagnostic CT scan of the chest, abdomen, and pelvis with  intravenous contrast for interpretation.  3. CT of the chest, abdomen and pelvis obtained for diagnostic  interpretation.  4. 3D MIP and PET-CT fused images were processed on an independent  workstation and archived to PACS and reviewed by a radiologist.     Technique:     1. PET: The patient received 12.41 mCi of F-18-FDG; the serum glucose  was 100 and prior to administration, body weight was 59.2 kg. Images  were evaluated in the axial, sagittal, and coronal planes as well as  the rotational whole body MIP. Images were acquired from the Vertex to  the Feet.     UPTAKE WAS MEASURED AT 60 MINUTES.      BACKGROUND:  Liver SUV max= 3.4,   Aorta Blood SUV Max: 2.0.      2. CT: Volumetric acquisition for clinical interpretation of the  chest, abdomen, and pelvis acquired at 3 mm sections after the  uneventful administration of intravenous contrast. The chest, abdomen,  and pelvis were evaluated at 5 mm sections in bone, soft tissue, and  lung windows.       The patient received 80 cc. Of Isovue 370 intravenously and 500  mL  oral present contrast for the examination.       3. 3D MIP and PET-CT fused images were processed on an independent  workstation and archived to PACS and reviewed by a radiologist.     INDICATION: Invasive breast cancer     ADDITIONAL INFORMATION OBTAINED FROM EMR: 60-year-old woman with a  history of invasive lobular carcinoma of the right breast diagnosed in  2006. With chemoradiation and hormonal therapy. Underwent bilateral  mastectomy 2016. Radiotherapy to the lumbar spine T12-L5 in September 2019. Currently on therapy with Eribulin.     COMPARISON: PET-CT 7/17/2020     FINDINGS:      HEAD/NECK:  There is no suspicious FDG uptake in the neck.      The paranasal sinuses are clear. The mastoid air cells are clear. The  mucosal pharyngeal space, the , prevertebral and carotid  spaces are within normal limits. No masses, mass effect or  pathologically enlarged lymph nodes are evident. The thyroid gland is  unremarkable.     CHEST:  There is no suspicious FDG uptake in the chest.      Postsurgical changes of bilateral mastectomy. No abnormal FDG uptake  within the soft tissues adjacent to the breast implants. No enlarged  or hypermetabolic lymph nodes in the axillae or elsewhere in the  chest.     Heart size is within normal limits. Normal caliber of the thoracic  aorta and main pulmonary artery. Resolution of the previously seen  left-sided pulmonary emboli. No new pulmonary emboli demonstrated.  Left chest wall Port-A-Cath catheter tip terminates at the superior  cavoatrial junction. Decreased FDG uptake to the esophagus, for  example distally with SUV max 4.6 (series 4, image 201), previously  5.7, suggesting decreased inflammation. There is a small sliding-type  hiatal hernia.     The central tracheobronchial tree is patent. No pleural effusion or  pneumothorax. No focal consolidation. Mild dependent atelectasis.  Linear attenuation within the lingula also favoring atelectasis. No  suspicious  pulmonary nodule.      ABDOMEN AND PELVIS:  There is no suspicious FDG uptake in the abdomen or pelvis.     The liver, pancreas, spleen, and adrenal glands are unremarkable. The  gallbladder surgically absent. No intrahepatic or extrahepatic ductal  or ductal dilatation. Symmetric nephrographic enhancement of the  kidneys. No hydronephrosis. Bladder is decompressed. Prominence of the  bladder wall may be secondary to the degree of nondistention. Atrophic  appearance of the uterus. Normal caliber of the small large bowel. No  free fluid or fluid collection. No enlarged or hypermetabolic lymph  nodes in the abdomen or pelvis. Normal caliber of the abdominal aorta.     LOWER EXTREMITIES:   No abnormal masses or hypermetabolic soft tissue lesions.     BONES:   Again demonstrated are the multiple mixed lytic and sclerotic lesions  throughout the axial skeleton including in the calvarium, spine, ribs,  sternum, and pelvis, as well as in the right femoral neck. Majority of  these lesions demonstrate low levels of FDG uptake are similar to  prior study. However, there are multiple lesions demonstrating mildly  increased FDG uptake since 7/17/2020. Examples include (series 4):   - Image 215: Increased uptake to the expansile lytic lesion involving  the T9 lamina and T10 right pedicle with SUV max 6.2, previously SUV  max 5.3.  - Image 312: Increased uptake to mixed sclerotic/lytic lesion in the  right iliac crest with SUV max for 0.8, previously 3.0.  - Image 184: Increased uptake to a subacute nondisplaced fracture in  the anterior left 4th rib, SUV max 3.3, previously no fracture with  SUV max 2.1.  - Image 187: Increased uptake to a lesion in the lateral right 7th  rib, which is located just anterior to an old fracture, SUV max 3.7,  previously 2.7.   - Image 339: Increased uptake to a mixed sclerotic/lytic lesion in the  left sacral ala near the sacroiliac joint, SUV max 4.3, previously  3.8.     New uptake to subtle  lesion in the right manubrium with mild FDG  uptake, SUV max 2.8 (series 4, image 151)     The lesion in the anterior left fifth rib previously demonstrating  increased uptake demonstrates slightly decreased uptake on the study  with SUV max 3.5 (series 4, image 210), previously 3.9. Appearances  suggestive of healing fracture. There are additional multiple chronic  rib fractures bilaterally demonstrating low levels of uptake similar  to minimally increased from prior study.     Multilevel degenerative changes in the spine. Unchanged severe  compression deformity of L1 with greater than 75% height loss.  Unchanged relative photopenia of the T12-L5 vertebral bodies, in  keeping with history of radiotherapy.                                                                      IMPRESSION: In this patient with a history of metastatic breast cancer  status post bilateral mastectomies, chemoradiation, and currently  undergoing therapy with Eribulin, there is disease progression since  7/17/2020:     1. Worsening metastatic disease to the bones:  1a. Increased FDG uptake to the lytic lesions in the T9/T10 right  posterolateral elements.  1b. Increased FDG uptake to a previously non-hypermetabolic mixed  sclerotic/lytic lesion in the right iliac bone, suggesting recurrence  of a previously treated metastasis.  1c. New subtle lesion in the right manubrium.  1d.  New fracture in the anterolateral left 4th rib with associated  uptake. This may be a pathologic fracture.  1e. Decreased uptake to the previously increased left anterior 5th rib  lesion, which appears to be a healing fracture.     2. Resolution of the previously new left pulmonary emboli.     3. Decreased FDG uptake to the esophagus, consistent with decreased  inflammation.    ASSESSMENT AND PLAN:   1.  History of stage IIB, T2 N1 M0 invasive lobular carcinoma of the right breast.  It was initially diagnosed in 2006 and at that time it was hormone receptor positive,  HER-2 negative.  She was treated on ECOG 2104 protocol with dose-dense Adriamycin, Cytoxan and Avastin x4 cycles followed by Taxol and Avastin x4 cycles followed by a Avastin for 1 year.  She also received radiation to the right breast which she completed in January 2007 (5040 cGy).  She took Aromasin from 2007 to 2014.    Now she has metastatic breast cancer with extensive involvement of the bones.  There is also a left lower lobe hypermetabolic lesion and mediastinal lymph nodes which are hypermetabolic.  The biopsy from the right iliac bone is consistent with metastatic lobular breast cancer but it is hormone receptor and HER-2 negative and PDL 1 is also negative.    She has received 3000 cGy of palliative radiation to T12-L5 from 9/6/2019 till 9/18/2019.    She was started on palliative Xeloda but she was unable to tolerate even the dose reduced medication.        We decided on repeating scans and MRI brain on 10/25/2019 showed no intracranial metastatic disease.   Multiple enhancing calvarial lesions may be increased in number and are suggestive of metastatic disease. Thinner imaging on the current study may identify the smaller lesions and may be responsible for identified more lesions.   There is a single focus of T2 hyperintense signal within the anterior right temporal lobe may represent interval demyelination. There is no evidence for active demyelination.    PET/CT on 11/1/2019 showed favorable response to therapy and Overall FDG uptake within scattered osseous metastases is decreased since 8/30/2019, particularly within the pelvis. Increased sclerotic appearance of L1 and L4 pathologic compression deformities, likely sequela of recently completed palliative radiation therapy.    No significant FDG uptake in previously demonstrated hypermetabolic mediastinal lymph nodes. Biopsy negative on 9/5/2019.  There is also decreased FDG uptake in the left lower lobe (biopsy negative for malignancy), now with dense  consolidation containing air bronchograms suggestive of infection versus aspiration.  There is diffuse FDG uptake within the esophagus, consistent with esophagitis.    Because of intolerance to Xeloda we decided on switching to weekly paclitaxel on 11/5/2019.    For better tolerance we decreased the dose of paclitaxel to 70 mg/m  with cycle #2.  She has completed 3 cycles of Taxol and the last chemotherapy was on 1/14/2020.      PET/CT on 1/24/2020 showed progression of the disease in some of the bone lesions including increase in size and lytic lesion within the vertebral body of C4 measuring 1 cm as opposed to 0.6 cm previously and a new central soft tissue nodule with SUV max 4.1 when previously it was non-hypermetabolic.  There is also some progression noted in the right proximal femur and right iliac bone.  There is decreased metabolic uptake in the right lamina of T9 with SUV max 4.7 when previously it was 8.0.  Other lesions are stable.    There are no pathologically enlarged mediastinal, hilar or axillary lymph nodes. Calcified subcarinal lymph nodes. There are no suspicious lung nodules or evidence for infection and previous left lower lobe consolidation has resolved.     CA 27-29 also had increased significantly and it was 257 on 1/28/2020.    Due to progression we switched to eribulin on 1/28/2020.        After 3 cycles, she had a repeat PET/CT on 3/27/2020 which showed a stable size of the bone metastatic lesions although the FDG avidity was less, consistent with treatment response.    She had MRI of the thoracic spine on 4/3/2020 and it was compared to the one which was done in August 2019 and it showed increased size of several metastatic lesions most notably at T9 but also at T5-T7.  Other lesions at T10, T11 and T12 appeared improved.    CA 27-29 was also down to 222 on 3/18/2020.    I believe overall this is consistent with a partial response to the treatment.  Overall she is tolerating eribulin  well with some worsening of neuropathy and cytopenias.     We decided on continuing the chemotherapy with some modifications and she got cycle #4 with slightly decreased dose of eribulin to 1.2 mg/m2.      After 8 cycles of eribulin repeat PET CT on 7/17/2020 showed only minimally increased FDG uptake in T9 and T10 and in the left anterior fifth rib near the costochondral junction.  That is slightly decreased FDG avidity in the right femoral neck lesion.  Otherwise bone disease is a stable.  No other evidence of metastatic disease is found.    CA 27-29 on 6/30/2020 was 308.    We decided on continuing the same chemotherapy because overall clinical picture was consistent with stable to only minimally progressive disease and she was tolerating treatments well with decent quality of life.    Cycle #10 was delayed by 1 week because ANC was 0.9.        After 13 cycles of eribulin, PET scan on 11/6/2020 shows progression of the disease with increased FDG uptake in the lytic lesions in the T9/T10 and right posterolateral elements as well as increased FDG uptake in the previously known hypermetabolic right iliac bone lesion suggesting recurrence of previously treated metastasis.  There is new subtle lesion in the right manubrium.  There is also a new fracture in the anterolateral left fourth rib with associated uptake and this could be a pathologic fracture.  Healing fracture in the left anterior fifth rib lesion.  There is resolution of the left pulmonary emboli.  Decreased FDG uptake of the esophagus consistent with improving inflammation.    She is doing pretty well and her performance status is ECOG 1.    Because of progression of the disease, I recommend switching to recently FDA approved sacituzumab govitecan-hziy (Trodelvy) for TNBC, based on the results of the phase II IMMU-132-01 clinical trial.   We discussed the rational schedule and potential side effects of it in detail.    We gave it to her on  11/11/2020.    She had significant nausea and vomiting with it but otherwise tolerated it well.  She will get cycle #2 of Trodelvy today.    Nausea and vomiting.  I will add Emend and premedications.  She will continue to take Compazine and now she has sublingual Zofran as needed as well.    Bone disease.  She has metastatic disease to the bone.  Previously she got palliative radiation to T12-L5 3000 cGy in 10 fractions from 9/6/2019 till 9/18/2019.  She was evaluated by orthopedics Dr. Bipin Elliott on 11/1/2019 and conservative management was recommended.    We are giving her Zometa every 3 months. She last received on 9/29/2020.  Because of progression of the disease in the bones, I recommended switching to Xgeva which she received on 11/11/2020.  She will get this every 4 weeks.  Continue calcium and vitamin D.     Subcutaneous nodule in the scalp.  I am not certain whether it is a cyst or a metastatic deposit.  Currently it is not bothering her.  We will keep a close watch on this for now.    Leukopenia/anemia.  From the chemotherapy.  Overall mild.  ANC is normal.  Continue to observe.    Bilateral pulmonary emboli.  She has been asymptomatic and these were incidentally found on 7/17/2020.  Continue Eliquis.        Neuropathy.  She has mild neuropathy.  We will continue to observe closely when she is switched to Trodelvy. She has baseline balance issues and heat and cold sensitivity from multiple sclerosis.       Pain management.  She is following with palliative care and continues to be on morphine sulfate immediate release as well as MS Contin which she usually takes at night.     We did not address the following today.    Sleeping problems.  Continue trazodone.    Vision changes.    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  It was thought that this could be due to Taxol as patient reported to the ophthalmologist that she was on Taxol in September 2019 when her  symptoms started.  But she was not on Taxol in September 2019 when she started noticing the visual changes so Taxol is not responsible for this.  The first dose of Taxol was on 11/5/2019.   Her vision is much better now.  She will continue to follow closely with ophthalmology.       Increased FDG ability in the colon.  She had FDG uptake in the cecum and ascending colon but no corresponding lesion was seen on the CT scan.  She is completely asymptomatic so at this time we will continue to observe.    Fatigue.  This is from the cancer and chemotherapy.  I again encouraged her to do regular exercise.  Continue to follow with cancer rehab.        Discussion regarding healthcare directives.  On previous visit she told me that she will bring the health care directive for our records but she forgot so I told her to bring it on next visit.      Breast implant removal.  She tells me that she was planning to do breast implant removal because there was a recall on this.  Her surgery was scheduled for 9/24/2019.  Because of this new development of metastatic breast cancer and her recent radiation, surgery has been canceled.  We discussed that at this time treatment for metastatic breast cancer would take precedence over the other surgery as the other surgery would require her to be off chemotherapy for several weeks and in that case the chances of cancer progression would be high and I would not recommend that.    Multiple sclerosis.  She will continue to follow with her neurologist Dr. Quiles.  Currently multiple sclerosis is under control and currently she is not taking any medications.    Return to clinic in 3 weeks.    I answered all of her questions to her satisfaction and she agrees with this plan.        Dewayne Zepeda MD    Video start time. 9:39 AM  Video stop time. 9:50 AM

## 2020-12-02 NOTE — LETTER
"    12/2/2020         RE: Shaneka Alvarez  [address] [facility] [place] [address]        Dear Colleague,    Thank you for referring your patient, Shaneka Alvarez, to the [facility]. Please see a copy of my visit note below.    Oncology Rooming Note    December 2, 2020 9:21 AM   Shaneka Alvarez is a 60 year old female who presents for:    Chief Complaint   Patient presents with     Oncology Clinic Visit     Initial Vitals: There were no vitals taken for this visit. Estimated body mass index is 22.86 kg/m  as calculated from the following:    Height as of 4/7/20: 1.575 m (5' 2\").    Weight as of 11/30/20: 56.7 kg (125 lb). There is no height or weight on file to calculate BSA.  Data Unavailable Comment: Data Unavailable   No LMP recorded. Patient is postmenopausal.  Allergies reviewed: Yes  Medications reviewed: Yes    Medications: Medication refills not needed today.  Pharmacy name entered into [facility]:    [id number] - [place], [place] - [address] [facility][id number] - [place], [place] - [address] [facility] [address]  [facility] - [place], [place] - [address] [facility] INPATIENT RX - [facility] [address] [place] [address]    Clinical concerns: wants to change appts to Monday or Tuesday Dr Zepeda was notified.      Javier Roland RN                  ONCOLOGY FOLLOW UP NOTE: 12/2/2020        I took the history from reviewing the previous notes that she was following with Dr. Amaya.  I have copied and updated from prior notes.    ONCOLOGY History:    1.  January 2006:  Diagnosed with Stage IIB, T2 N1 M0 invasive lobular carcinoma of the right breast.  Final pathology showed a 4.5 x 3.8 x 2.5 cm, 01/14 lymph nodes positive.  Estrogen, progesterone receptor positive, HER-2 negative.     2.  Genetics.  BRCA1 and 2 mutations not detected. 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       THERAPY TO DATE: "   1.  2006:  ECOG 2104 protocol of dose-dense Adriamycin, Cytoxan and Avastin x4 cycles followed by Taxol and Avastin x4 cycles.   2.  03/2007:  Completed 1 year Avastin.   3.  11/2006-01/2007:  Radiotherapy to the right breast of 5040 cGy.   4.  [id number]/[id number]:  Aromasin.  Stopped after moving to the [facility].   5.  01/2016:  Right modified radical mastectomy with latissimus dorsi flap reconstruction and a left prophylactic mastectomy with latissimus dorsi flap reconstruction.     She recently had MRI of the brain as a follow-up for multiple sclerosis and there were multiple calvarial lesions noted which was suspicious for metastatic disease.  There was no evidence of new multiple sclerosis lesions.  She had a PET scan on 8/30/2019 which showed widespread bone metastatic lesions (calvarium, spine, sacrum, pelvis, ribs most prominent at C7, T3, L1 and L4), hypermetabolic 3 cm lesion in LLL, and mediastinal/hilar LN. CEA wnl at 1.9 and C27-29 elevated to 415 (28 on 8/23/16). A CT guided biopsy of the right iliac bone was obtained on 9/4/19, pathology consistent with metastatic adenocarcinoma (breast), ER/NE negative, HER-2 negative PDL 1 < 1%. A second biopsy was take of the LLL nodule via EBUS on 9/5/19 did not show any malignancy.    C/T/L spine MRI 8/3/19 to 9/2/19 with numerous enhancing lesions of the C, T and L spine, largest around T9 and L1. No evidence of cord compression. Has a L1 compression fracture and impending L4.     Received radiation T12-L5 3000 cGy in 10 fractions from 9/6/2019 till 9/18/2019.  Neurosurgery recommended no surgical intervention but to wear Gorge brace when out of bed and HOB >30 degrees    She started palliative Xeloda 2000/1500 on 10/1/2019 but after just a few doses she noticed nausea, red eyes blurred vision, loss of appetite.  She stopped taking it after 5 doses and was seen by nurse practitioner on 10/7/2019 when she was improving.  At that point she was restarted on Xeloda  at a lower dose of 1000 mg twice a day.  As she was not able to tolerate even the lower dose with extreme nausea and vomiting and feeling extremely fatigued and blurry vision, we decided on stopping Xeloda.    We decided on repeating scans and MRI brain on 10/25/2019 showed no intracranial metastatic disease.   Multiple enhancing calvarial lesions may be increased in number and are suggestive of metastatic disease. Thinner imaging on the current study may identify the smaller lesions and may be responsible for  identified more lesions.   There is a single focus of T2 hyperintense signal within the anterior right temporal lobe may represent interval demyelination. There is no evidence for active demyelination.    PET/CT on 11/1/2019 showed favorable response to therapy and Overall FDG uptake within scattered osseous metastases is decreased since 8/30/2019, particularly within the pelvis. Increased sclerotic appearance of L1 and L4 pathologic compression deformities, likely sequela of recently completed palliative radiation therapy.    No significant FDG uptake in previously demonstrated hypermetabolic mediastinal lymph nodes. Biopsy negative on 9/5/2019.  There is also decreased FDG uptake in the left lower lobe (biopsy negative for  malignancy), now with dense consolidation containing air bronchograms suggestive of infection versus aspiration.  There is diffuse FDG uptake within the esophagus, consistent with esophagitis.    Because of intolerance to Xeloda we decided on switching to weekly paclitaxel on 11/5/2019.    C#2 Taxol 12/4/19- started with 70mg/m2 dose reduction    C#3 Taxol 12/30/2019     PET/CT on 1/24/2020 showed progression of the disease in some of the bone lesions including increase in size and lytic lesion within the vertebral body of C4 measuring 1 cm as opposed to 0.6 cm previously and a new central soft tissue nodule with SUV max 4.1 when previously it was non-hypermetabolic.  There is also some  progression noted in the right proximal femur and right iliac bone.  There is decreased metabolic uptake in the right lamina of T9 with SUV max 4.7 when previously it was 8.0.  Other lesions are stable.    CA 27-29 also had increased significantly and it was 257 on 1/28/2020.    We decided on switching to eribulin on 1/28/2020.    C#2 2/18/2020  C#2 D#8 2/25/2020    C#3 D#1 3/18/2020 -delayed by 1 week as per patient's preference because she wanted to visit her family.    She had a repeat PET/CT on 3/27/2020 which showed stable size of the bone metastatic lesions although the FDG avidity was less, consistent with treatment response.    She had MRI of the thoracic spine on 4/3/2020 and it was compared to the one which was done in August 2019 and it showed increased size of several metastatic lesions most notably at T9 but also at T5-T7.  Other lesions at T10, T11 and T12 appeared improved.    CA 27-29 was also down to 222 on 3/18/2020.    Cycle #4 eribulin ( dose reduced to 1.2 mg/m2 )-4/7/2020-   Cycle #5 Eribulin ( 1.2 mg/m2 )- 4/28/2020   Cycle #6 Eribulin ( 1.2 mg/m2 )- 5/19/2020     Cycle #7 Eribulin ( 1.2 mg/m2 )- 6/9/2020    Cycle #8 Eribulin ( 1.2 mg/m2 )- 6/30/2020     She had a repeat PET scan on 7/17/2020 which showed incidental finding of new acute bilateral pulmonary embolism in the distal left main pulmonary artery extending in the left upper and lower lobes and in the segmental and branch arteries in the right lower lobe.    It also showed mildly increased FDG avidity in the lytic lesion in the T9 lamina and right pedicle with SUV max 5.3, previously 3.9.  That is also slightly increased FDG uptake to the left anterior fifth rib at the costochondral junction SUV max 3.9, previously 2.5.  There is slightly decreased FDG uptake in the right femoral neck lesion SUV max 2.2, previously 2.9.  FDG uptake in other bone lesions is fairly stable.  No new metastasis are seen.    CA 27-29 was 308 on 6/30/2020.   It was 286 on 5/19/2020.    Overall this is consistent with only slight progression versus stable disease.    She was started on Lovenox.    Cycle #9 eribulin 7/21/2020. CA 27-29 was 238    C#10 eribulin 8/18/2020. ( delayed by one week for ANC 0.9 )    C#11 Eribulin 9/8/2020    C#12 Eribulin 9/29/2020     C#13 Eribulin 10/20/2020     PET scan on 11/6/2020 shows progression of the disease with increased FDG uptake in the lytic lesions in the T9/T10 and right posterolateral elements as well as increased FDG uptake in the previously known hypermetabolic right iliac bone lesion suggesting recurrence of previously treated metastasis.  There is new subtle lesion in the right manubrium.  There is also a new fracture in the anterolateral left fourth rib with associated uptake and this could be a pathologic fracture.  Healing fracture in the left anterior fifth rib lesion.  There is resolution of the left pulmonary emboli.  Decreased FDG uptake of the esophagus consistent with improving inflammation.    CA 27-29 on 1020/2020 was also elevated at 489.    C#1 Trodelvy on 11/11/2020.  Cycle #2 Trodelvy 12/2/2020      After receiving dental clearance, she got Zometa on 10/23/2019 and she is receiving it every 3 months.  Last one was on 9/29/2020.      Switch to Xgeva on 11/11/2020 because of progression of bone metastasis.      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  It was thought that this could be due to Taxol as patient reported to the ophthalmologist that she was on Taxol in September 2019 when her symptoms started.  But she was not on Taxol in September 2019 when she started noticing the visual changes so Taxol is not responsible for this.  The first dose of Taxol was on 11/5/2019.       Interval history.  This is a video visit.  At the end of the video visit I also went into her room to examine the back of her scalp.  She mentioned that on day 3 and day 4 she had nausea and  vomiting with each dose of Trodelvy.  She was taking Compazine every 6 hours.  She recently got sublingual Zofran but she has not tried that.  Otherwise she did not have any diarrhea or constipation.  No fevers or infections.  Overall energy is good.  She has not lost weight.  She denies any difficulty breathing.  Denies any worsening of the neuropathy.  Pain is under decent control and she takes morphine sulfate as needed during the day and MS Contin at night.    She has noticed that there is swelling at the back of the scalp which is probably slightly getting worse and she first noticed it a few weeks ago.  It does not hurt.      ECOG 1    ROS:  Rest of the comprehensive review of the system was essentially unremarkable.        I reviewed other history in epic as below.       PAST MEDICAL HISTORY:     1.  Breast cancer  2.  Multiple sclerosis.   3.  Depression.   4.  Migraines.   5.  Hypertension.   6.  Cholecystectomy and umbilical hernia repair.     SOCIAL HISTORY: She smoked for 5 years but quit many years ago.  Drinks alcohol socially.  She lives with her .  She is a teacher in middle school.       FAMILY HISTORY: She mentions that her mother had breast cancer at 49.  Both grandmothers had breast cancer but she is not sure of the age.  A couple of cousins have cancers.  Patient has 3 children who are healthy.    Current Outpatient Medications   Medication     acetaminophen (TYLENOL) 500 MG tablet     apixaban ANTICOAGULANT (ELIQUIS) 5 MG tablet     busPIRone (BUSPAR) 15 MG tablet     calcium citrate-vitamin D (CITRACAL) 315-250 MG-UNIT TABS     Cholecalciferol (VITAMIN D3 PO)     cyclopentolate (CYCLOGYL) 1 % ophthalmic solution     diclofenac (VOLTAREN) 1 % topical gel     ibuprofen (ADVIL/MOTRIN) 600 MG tablet     loperamide (IMODIUM) 2 MG capsule     LORazepam (ATIVAN) 0.5 MG tablet     LORazepam (ATIVAN) 0.5 MG tablet     magnesium 250 MG tablet     morphine (MS CONTIN) 15 MG CR tablet     morphine  "(MSIR) 15 MG IR tablet     naloxone (NARCAN) 1 mg/mL for intranasal kit (2 syringes with 2 mucosal atomizer device)     ondansetron (ZOFRAN) 8 MG tablet     ondansetron (ZOFRAN-ODT) 8 MG ODT tab     polyethylene glycol (MIRALAX/GLYCOLAX) packet     prochlorperazine (COMPAZINE) 10 MG tablet     propranolol (INDERAL LA) 60 MG 24 hr capsule     senna-docusate (SENOKOT-S/PERICOLACE) 8.6-50 MG tablet     syringe/needle, disp, (B-D INTEGRA SYRINGE) 23G X 1\" 3 ML MISC     traZODone (DESYREL) 50 MG tablet     venlafaxine (EFFEXOR-ER) 225 MG TB24 24 hr tablet     No current facility-administered medications for this visit.      Facility-Administered Medications Ordered in Other Visits   Medication     heparin 100 UNIT/ML injection 5 mL     sodium chloride (PF) 0.9% PF flush 10-20 mL        Allergies   Allergen Reactions     Bactrim [Sulfamethoxazole W/Trimethoprim]      Internal bleeding     Copaxone [Glatiramer] Hives          PHYSICAL EXAMINATION:     /78   Pulse 75   Temp 98.2  F (36.8  C) (Oral)   Wt 62.2 kg (137 lb 1.6 oz)   SpO2 99%   BMI 25.08 kg/m    Wt Readings from Last 4 Encounters:   12/02/20 62.2 kg (137 lb 1.6 oz)   11/30/20 56.7 kg (125 lb)   11/18/20 60.6 kg (133 lb 8 oz)   11/11/20 61.9 kg (136 lb 6.4 oz)           Constitutional.  Does not seem to be in any acute distress.  Eyes.  No redness or discharge noted.  Scalp: There is a subcutaneous nodule around the occipital region and it feels like a cyst.  A subcutaneous metastatic deposit cannot be excluded.  Respiratory.  Speaking in full sentences.  Breathing seems comfortable without any accessory use of muscles.    Skin.  Visualized his skin does not show any obvious rashes.  Musculoskeletal.  Range of motion for visualized areas is intact.  Neurological.  Alert and oriented x3.  Psychiatric.  Mood, mentation and affect are normal.  Decision making capacity is intact.      The rest of a comprehensive physical examination is deferred due to " [facility] video visit restrictions.        Labs and Imaging.    Reviewed.    Combined Report of:    PET and CT on  11/6/2020 10:44 AM :     1. PET of the neck, chest, abdomen, and pelvis.  2. PET CT Fusion for Attenuation Correction and Anatomical  Localization:    3. Diagnostic CT scan of the chest, abdomen, and pelvis with  intravenous contrast for interpretation.  3. CT of the chest, abdomen and pelvis obtained for diagnostic  interpretation.  4. 3D MIP and PET-CT fused images were processed on an independent  workstation and archived to PACS and reviewed by a radiologist.     Technique:     1. PET: The patient received 12.41 mCi of F-18-FDG; the serum glucose  was 100 and prior to administration, body weight was 59.2 kg. Images  were evaluated in the axial, sagittal, and coronal planes as well as  the rotational whole body MIP. Images were acquired from the Vertex to  the Feet.     UPTAKE WAS MEASURED AT 60 MINUTES.      BACKGROUND:  Liver SUV max= 3.4,   Aorta Blood SUV Max: 2.0.      2. CT: Volumetric acquisition for clinical interpretation of the  chest, abdomen, and pelvis acquired at 3 mm sections after the  uneventful administration of intravenous contrast. The chest, abdomen,  and pelvis were evaluated at 5 mm sections in bone, soft tissue, and  lung windows.       The patient received 80 cc. Of Isovue 370 intravenously and 500 mL  oral present contrast for the examination.       3. 3D MIP and PET-CT fused images were processed on an independent  workstation and archived to PACS and reviewed by a radiologist.     INDICATION: Invasive breast cancer     ADDITIONAL INFORMATION OBTAINED FROM EMR: 60-year-old woman with a  history of invasive lobular carcinoma of the right breast diagnosed in  2006. With chemoradiation and hormonal therapy. Underwent bilateral  mastectomy 2016. Radiotherapy to the lumbar spine T12-L5 in September 2019. Currently on therapy with Eribulin.     COMPARISON: PET-CT  7/17/2020     FINDINGS:      HEAD/NECK:  There is no suspicious FDG uptake in the neck.      The paranasal sinuses are clear. The mastoid air cells are clear. The  mucosal pharyngeal space, the , prevertebral and carotid  spaces are within normal limits. No masses, mass effect or  pathologically enlarged lymph nodes are evident. The thyroid gland is  unremarkable.     CHEST:  There is no suspicious FDG uptake in the chest.      Postsurgical changes of bilateral mastectomy. No abnormal FDG uptake  within the soft tissues adjacent to the breast implants. No enlarged  or hypermetabolic lymph nodes in the axillae or elsewhere in the  chest.     Heart size is within normal limits. Normal caliber of the thoracic  aorta and main pulmonary artery. Resolution of the previously seen  left-sided pulmonary emboli. No new pulmonary emboli demonstrated.  Left chest wall Port-A-Cath catheter tip terminates at the superior  cavoatrial junction. Decreased FDG uptake to the esophagus, for  example distally with SUV max 4.6 (series 4, image 201), previously  5.7, suggesting decreased inflammation. There is a small sliding-type  hiatal hernia.     The central tracheobronchial tree is patent. No pleural effusion or  pneumothorax. No focal consolidation. Mild dependent atelectasis.  Linear attenuation within the lingula also favoring atelectasis. No  suspicious pulmonary nodule.      ABDOMEN AND PELVIS:  There is no suspicious FDG uptake in the abdomen or pelvis.     The liver, pancreas, spleen, and adrenal glands are unremarkable. The  gallbladder surgically absent. No intrahepatic or extrahepatic ductal  or ductal dilatation. Symmetric nephrographic enhancement of the  kidneys. No hydronephrosis. Bladder is decompressed. Prominence of the  bladder wall may be secondary to the degree of nondistention. Atrophic  appearance of the uterus. Normal caliber of the small large bowel. No  free fluid or fluid collection. No enlarged or  hypermetabolic lymph  nodes in the abdomen or pelvis. Normal caliber of the abdominal aorta.     LOWER EXTREMITIES:   No abnormal masses or hypermetabolic soft tissue lesions.     BONES:   Again demonstrated are the multiple mixed lytic and sclerotic lesions  throughout the axial skeleton including in the calvarium, spine, ribs,  sternum, and pelvis, as well as in the right femoral neck. Majority of  these lesions demonstrate low levels of FDG uptake are similar to  prior study. However, there are multiple lesions demonstrating mildly  increased FDG uptake since 7/17/2020. Examples include (series 4):   - Image 215: Increased uptake to the expansile lytic lesion involving  the T9 lamina and T10 right pedicle with SUV max 6.2, previously SUV  max 5.3.  - Image 312: Increased uptake to mixed sclerotic/lytic lesion in the  right iliac crest with SUV max for 0.8, previously 3.0.  - Image 184: Increased uptake to a subacute nondisplaced fracture in  the anterior left 4th rib, SUV max 3.3, previously no fracture with  SUV max 2.1.  - Image 187: Increased uptake to a lesion in the lateral right 7th  rib, which is located just anterior to an old fracture, SUV max 3.7,  previously 2.7.   - Image 339: Increased uptake to a mixed sclerotic/lytic lesion in the  left sacral ala near the sacroiliac joint, SUV max 4.3, previously  3.8.     New uptake to subtle lesion in the right manubrium with mild FDG  uptake, SUV max 2.8 (series 4, image 151)     The lesion in the anterior left fifth rib previously demonstrating  increased uptake demonstrates slightly decreased uptake on the study  with SUV max 3.5 (series 4, image 210), previously 3.9. Appearances  suggestive of healing fracture. There are additional multiple chronic  rib fractures bilaterally demonstrating low levels of uptake similar  to minimally increased from prior study.     Multilevel degenerative changes in the spine. Unchanged severe  compression deformity of L1 with  greater than 75% height loss.  Unchanged relative photopenia of the T12-L5 vertebral bodies, in  keeping with history of radiotherapy.                                                                      IMPRESSION: In this patient with a history of metastatic breast cancer  status post bilateral mastectomies, chemoradiation, and currently  undergoing therapy with Eribulin, there is disease progression since  7/17/2020:     1. Worsening metastatic disease to the bones:  1a. Increased FDG uptake to the lytic lesions in the T9/T10 right  posterolateral elements.  1b. Increased FDG uptake to a previously non-hypermetabolic mixed  sclerotic/lytic lesion in the right iliac bone, suggesting recurrence  of a previously treated metastasis.  1c. New subtle lesion in the right manubrium.  1d.  New fracture in the anterolateral left 4th rib with associated  uptake. This may be a pathologic fracture.  1e. Decreased uptake to the previously increased left anterior 5th rib  lesion, which appears to be a healing fracture.     2. Resolution of the previously new left pulmonary emboli.     3. Decreased FDG uptake to the esophagus, consistent with decreased  inflammation.    ASSESSMENT AND PLAN:   1.  History of stage IIB, T2 N1 M0 invasive lobular carcinoma of the right breast.  It was initially diagnosed in 2006 and at that time it was hormone receptor positive, HER-2 negative.  She was treated on ECOG 2104 protocol with dose-dense Adriamycin, Cytoxan and Avastin x4 cycles followed by Taxol and Avastin x4 cycles followed by a Avastin for 1 year.  She also received radiation to the right breast which she completed in January 2007 (5040 cGy).  She took Aromasin from 2007 to 2014.    Now she has metastatic breast cancer with extensive involvement of the bones.  There is also a left lower lobe hypermetabolic lesion and mediastinal lymph nodes which are hypermetabolic.  The biopsy from the right iliac bone is consistent with metastatic  lobular breast cancer but it is hormone receptor and HER-2 negative and PDL 1 is also negative.    She has received 3000 cGy of palliative radiation to T12-L5 from 9/6/2019 till 9/18/2019.    She was started on palliative Xeloda but she was unable to tolerate even the dose reduced medication.        We decided on repeating scans and MRI brain on 10/25/2019 showed no intracranial metastatic disease.   Multiple enhancing calvarial lesions may be increased in number and are suggestive of metastatic disease. Thinner imaging on the current study may identify the smaller lesions and may be responsible for identified more lesions.   There is a single focus of T2 hyperintense signal within the anterior right temporal lobe may represent interval demyelination. There is no evidence for active demyelination.    PET/CT on 11/1/2019 showed favorable response to therapy and Overall FDG uptake within scattered osseous metastases is decreased since 8/30/2019, particularly within the pelvis. Increased sclerotic appearance of L1 and L4 pathologic compression deformities, likely sequela of recently completed palliative radiation therapy.    No significant FDG uptake in previously demonstrated hypermetabolic mediastinal lymph nodes. Biopsy negative on 9/5/2019.  There is also decreased FDG uptake in the left lower lobe (biopsy negative for malignancy), now with dense consolidation containing air bronchograms suggestive of infection versus aspiration.  There is diffuse FDG uptake within the esophagus, consistent with esophagitis.    Because of intolerance to Xeloda we decided on switching to weekly paclitaxel on 11/5/2019.    For better tolerance we decreased the dose of paclitaxel to 70 mg/m  with cycle #2.  She has completed 3 cycles of Taxol and the last chemotherapy was on 1/14/2020.      PET/CT on 1/24/2020 showed progression of the disease in some of the bone lesions including increase in size and lytic lesion within the vertebral  body of C4 measuring 1 cm as opposed to 0.6 cm previously and a new central soft tissue nodule with SUV max 4.1 when previously it was non-hypermetabolic.  There is also some progression noted in the right proximal femur and right iliac bone.  There is decreased metabolic uptake in the right lamina of T9 with SUV max 4.7 when previously it was 8.0.  Other lesions are stable.    There are no pathologically enlarged mediastinal, hilar or axillary lymph nodes. Calcified subcarinal lymph nodes. There are no suspicious lung nodules or evidence for infection and previous left lower lobe consolidation has resolved.     CA 27-29 also had increased significantly and it was 257 on 1/28/2020.    Due to progression we switched to eribulin on 1/28/2020.        After 3 cycles, she had a repeat PET/CT on 3/27/2020 which showed a stable size of the bone metastatic lesions although the FDG avidity was less, consistent with treatment response.    She had MRI of the thoracic spine on 4/3/2020 and it was compared to the one which was done in August 2019 and it showed increased size of several metastatic lesions most notably at T9 but also at T5-T7.  Other lesions at T10, T11 and T12 appeared improved.    CA 27-29 was also down to 222 on 3/18/2020.    I believe overall this is consistent with a partial response to the treatment.  Overall she is tolerating eribulin well with some worsening of neuropathy and cytopenias.     We decided on continuing the chemotherapy with some modifications and she got cycle #4 with slightly decreased dose of eribulin to 1.2 mg/m2.      After 8 cycles of eribulin repeat PET CT on 7/17/2020 showed only minimally increased FDG uptake in T9 and T10 and in the left anterior fifth rib near the costochondral junction.  That is slightly decreased FDG avidity in the right femoral neck lesion.  Otherwise bone disease is a stable.  No other evidence of metastatic disease is found.    CA 27-29 on 6/30/2020 was  308.    We decided on continuing the same chemotherapy because overall clinical picture was consistent with stable to only minimally progressive disease and she was tolerating treatments well with decent quality of life.    Cycle #10 was delayed by 1 week because ANC was 0.9.        After 13 cycles of eribulin, PET scan on 11/6/2020 shows progression of the disease with increased FDG uptake in the lytic lesions in the T9/T10 and right posterolateral elements as well as increased FDG uptake in the previously known hypermetabolic right iliac bone lesion suggesting recurrence of previously treated metastasis.  There is new subtle lesion in the right manubrium.  There is also a new fracture in the anterolateral left fourth rib with associated uptake and this could be a pathologic fracture.  Healing fracture in the left anterior fifth rib lesion.  There is resolution of the left pulmonary emboli.  Decreased FDG uptake of the esophagus consistent with improving inflammation.    She is doing pretty well and her performance status is ECOG 1.    Because of progression of the disease, I recommend switching to recently FDA approved sacituzumab govitecan-hziy (Trodelvy) for TNBC, based on the results of the phase II IMMU-132-01 clinical trial.   We discussed the rational schedule and potential side effects of it in detail.    We gave it to her on 11/11/2020.    She had significant nausea and vomiting with it but otherwise tolerated it well.  She will get cycle #2 of Trodelvy today.    Nausea and vomiting.  I will add Emend and premedications.  She will continue to take Compazine and now she has sublingual Zofran as needed as well.    Bone disease.  She has metastatic disease to the bone.  Previously she got palliative radiation to T12-L5 3000 cGy in 10 fractions from 9/6/2019 till 9/18/2019.  She was evaluated by orthopedics Dr. Bipin Elliott on 11/1/2019 and conservative management was recommended.    We are giving her  Zometa every 3 months. She last received on 9/29/2020.  Because of progression of the disease in the bones, I recommended switching to Xgeva which she received on 11/11/2020.  She will get this every 4 weeks.  Continue calcium and vitamin D.     Subcutaneous nodule in the scalp.  I am not certain whether it is a cyst or a metastatic deposit.  Currently it is not bothering her.  We will keep a close watch on this for now.    Leukopenia/anemia.  From the chemotherapy.  Overall mild.  ANC is normal.  Continue to observe.    Bilateral pulmonary emboli.  She has been asymptomatic and these were incidentally found on 7/17/2020.  Continue Eliquis.        Neuropathy.  She has mild neuropathy.  We will continue to observe closely when she is switched to Trodelvy. She has baseline balance issues and heat and cold sensitivity from multiple sclerosis.       Pain management.  She is following with palliative care and continues to be on morphine sulfate immediate release as well as MS Contin which she usually takes at night.     We did not address the following today.    Sleeping problems.  Continue trazodone.    Vision changes.    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  It was thought that this could be due to Taxol as patient reported to the ophthalmologist that she was on Taxol in September 2019 when her symptoms started.  But she was not on Taxol in September 2019 when she started noticing the visual changes so Taxol is not responsible for this.  The first dose of Taxol was on 11/5/2019.   Her vision is much better now.  She will continue to follow closely with ophthalmology.       Increased FDG ability in the colon.  She had FDG uptake in the cecum and ascending colon but no corresponding lesion was seen on the CT scan.  She is completely asymptomatic so at this time we will continue to observe.    Fatigue.  This is from the cancer and chemotherapy.  I again encouraged her to do  regular exercise.  Continue to follow with cancer rehab.        Discussion regarding healthcare directives.  On previous visit she told me that she will bring the health care directive for our records but she forgot so I told her to bring it on next visit.      Breast implant removal.  She tells me that she was planning to do breast implant removal because there was a recall on this.  Her surgery was scheduled for 9/24/2019.  Because of this new development of metastatic breast cancer and her recent radiation, surgery has been canceled.  We discussed that at this time treatment for metastatic breast cancer would take precedence over the other surgery as the other surgery would require her to be off chemotherapy for several weeks and in that case the chances of cancer progression would be high and I would not recommend that.    Multiple sclerosis.  She will continue to follow with her neurologist Dr. Quiles.  Currently multiple sclerosis is under control and currently she is not taking any medications.    Return to clinic in 3 weeks.    I answered all of her questions to her satisfaction and she agrees with this plan.        Dewayne Zepeda MD    Video start time. 9:39 AM  Video stop time. 9:50 AM              Again, thank you for allowing me to participate in the care of your patient.        Sincerely,        Dewayne Zepeda MD

## 2020-12-02 NOTE — PROGRESS NOTES
Infusion Nursing Note:  Shaneka Alvarez presents today for C2 D1 Trodelvy.    Patient seen by provider today: Yes: Dr. Zepeda   present during visit today: Not Applicable.    Note: N/A.    Intravenous Access:  Implanted Port.    Treatment Conditions:  Lab Results   Component Value Date    HGB 11.4 12/02/2020     Lab Results   Component Value Date    WBC 3.1 12/02/2020      Lab Results   Component Value Date    ANEU 2.1 12/02/2020     Lab Results   Component Value Date     12/02/2020      Lab Results   Component Value Date     12/02/2020                   Lab Results   Component Value Date    POTASSIUM 3.9 12/02/2020           Lab Results   Component Value Date    MAG 2.1 12/02/2020            Lab Results   Component Value Date    CR 0.51 12/02/2020                   Lab Results   Component Value Date    RAFAELA 8.2 12/02/2020                Lab Results   Component Value Date    BILITOTAL 0.4 12/02/2020           Lab Results   Component Value Date    ALBUMIN 3.2 12/02/2020                    Lab Results   Component Value Date    ALT 40 12/02/2020           Lab Results   Component Value Date    AST 28 12/02/2020       Results reviewed, labs MET treatment parameters, ok to proceed with treatment.      Post Infusion Assessment:  Patient tolerated infusion without incident. Patient observed for 30 minutes post infusion.  Blood return noted pre and post infusion.  Site patent and intact, free from redness, edema or discomfort.  No evidence of extravasations.  Access discontinued per protocol.       Discharge Plan:   Discharge instructions reviewed with: Patient.  Patient and/or family verbalized understanding of discharge instructions and all questions answered.  Patient to stop and schedule upcoming appointments upon departure.  Patient discharged in stable condition accompanied by: self.  Departure Mode: Ambulatory.    Yanci Kate RN

## 2020-12-08 ENCOUNTER — INFUSION THERAPY VISIT (OUTPATIENT)
Dept: INFUSION THERAPY | Facility: CLINIC | Age: 60
End: 2020-12-08
Payer: COMMERCIAL

## 2020-12-08 ENCOUNTER — ONCOLOGY VISIT (OUTPATIENT)
Dept: ONCOLOGY | Facility: CLINIC | Age: 60
End: 2020-12-08
Payer: COMMERCIAL

## 2020-12-08 VITALS
OXYGEN SATURATION: 99 % | TEMPERATURE: 98.9 F | DIASTOLIC BLOOD PRESSURE: 92 MMHG | SYSTOLIC BLOOD PRESSURE: 138 MMHG | HEART RATE: 86 BPM | RESPIRATION RATE: 16 BRPM

## 2020-12-08 DIAGNOSIS — D70.1 CHEMOTHERAPY-INDUCED NEUTROPENIA (H): ICD-10-CM

## 2020-12-08 DIAGNOSIS — C50.911 BILATERAL MALIGNANT NEOPLASM OF BREAST IN FEMALE, UNSPECIFIED ESTROGEN RECEPTOR STATUS, UNSPECIFIED SITE OF BREAST (H): ICD-10-CM

## 2020-12-08 DIAGNOSIS — C50.919 METASTATIC BREAST CANCER: ICD-10-CM

## 2020-12-08 DIAGNOSIS — D70.1 CHEMOTHERAPY-INDUCED NEUTROPENIA (H): Primary | ICD-10-CM

## 2020-12-08 DIAGNOSIS — C50.912 BILATERAL MALIGNANT NEOPLASM OF BREAST IN FEMALE, UNSPECIFIED ESTROGEN RECEPTOR STATUS, UNSPECIFIED SITE OF BREAST (H): ICD-10-CM

## 2020-12-08 DIAGNOSIS — E83.39 HYPOPHOSPHATEMIA: Primary | ICD-10-CM

## 2020-12-08 DIAGNOSIS — E87.6 HYPOKALEMIA: Primary | ICD-10-CM

## 2020-12-08 DIAGNOSIS — E87.6 HYPOKALEMIA: ICD-10-CM

## 2020-12-08 DIAGNOSIS — T45.1X5A CHEMOTHERAPY-INDUCED NEUTROPENIA (H): ICD-10-CM

## 2020-12-08 DIAGNOSIS — T45.1X5A CHEMOTHERAPY-INDUCED NEUTROPENIA (H): Primary | ICD-10-CM

## 2020-12-08 LAB
ALBUMIN SERPL-MCNC: 4 G/DL (ref 3.4–5)
ALP SERPL-CCNC: 104 U/L (ref 40–150)
ALT SERPL W P-5'-P-CCNC: 32 U/L (ref 0–50)
ANION GAP SERPL CALCULATED.3IONS-SCNC: 11 MMOL/L (ref 3–14)
AST SERPL W P-5'-P-CCNC: 23 U/L (ref 0–45)
BASOPHILS # BLD AUTO: 0 10E9/L (ref 0–0.2)
BASOPHILS NFR BLD AUTO: 0.7 %
BILIRUB SERPL-MCNC: 0.6 MG/DL (ref 0.2–1.3)
BUN SERPL-MCNC: 17 MG/DL (ref 7–30)
CALCIUM SERPL-MCNC: 9.3 MG/DL (ref 8.5–10.1)
CHLORIDE SERPL-SCNC: 100 MMOL/L (ref 94–109)
CO2 SERPL-SCNC: 25 MMOL/L (ref 20–32)
CREAT SERPL-MCNC: 0.65 MG/DL (ref 0.52–1.04)
DIFFERENTIAL METHOD BLD: ABNORMAL
EOSINOPHIL # BLD AUTO: 0 10E9/L (ref 0–0.7)
EOSINOPHIL NFR BLD AUTO: 0.2 %
ERYTHROCYTE [DISTWIDTH] IN BLOOD BY AUTOMATED COUNT: 14.6 % (ref 10–15)
GFR SERPL CREATININE-BSD FRML MDRD: >90 ML/MIN/{1.73_M2}
GLUCOSE SERPL-MCNC: 113 MG/DL (ref 70–99)
HCT VFR BLD AUTO: 45.2 % (ref 35–47)
HGB BLD-MCNC: 15 G/DL (ref 11.7–15.7)
IMM GRANULOCYTES # BLD: 0 10E9/L (ref 0–0.4)
IMM GRANULOCYTES NFR BLD: 1 %
LYMPHOCYTES # BLD AUTO: 0.5 10E9/L (ref 0.8–5.3)
LYMPHOCYTES NFR BLD AUTO: 11.9 %
MAGNESIUM SERPL-MCNC: 2.5 MG/DL (ref 1.6–2.3)
MCH RBC QN AUTO: 27.7 PG (ref 26.5–33)
MCHC RBC AUTO-ENTMCNC: 33.2 G/DL (ref 31.5–36.5)
MCV RBC AUTO: 83 FL (ref 78–100)
MONOCYTES # BLD AUTO: 0.6 10E9/L (ref 0–1.3)
MONOCYTES NFR BLD AUTO: 14.3 %
NEUTROPHILS # BLD AUTO: 2.9 10E9/L (ref 1.6–8.3)
NEUTROPHILS NFR BLD AUTO: 71.9 %
PHOSPHATE SERPL-MCNC: 2 MG/DL (ref 2.5–4.5)
PLATELET # BLD AUTO: 300 10E9/L (ref 150–450)
POTASSIUM SERPL-SCNC: 3.4 MMOL/L (ref 3.4–5.3)
PROT SERPL-MCNC: 8.1 G/DL (ref 6.8–8.8)
RBC # BLD AUTO: 5.42 10E12/L (ref 3.8–5.2)
SODIUM SERPL-SCNC: 136 MMOL/L (ref 133–144)
WBC # BLD AUTO: 4.1 10E9/L (ref 4–11)

## 2020-12-08 PROCEDURE — S0028 INJECTION, FAMOTIDINE, 20 MG: HCPCS | Performed by: NURSE PRACTITIONER

## 2020-12-08 PROCEDURE — 96413 CHEMO IV INFUSION 1 HR: CPT | Performed by: NURSE PRACTITIONER

## 2020-12-08 PROCEDURE — 80053 COMPREHEN METABOLIC PANEL: CPT | Performed by: INTERNAL MEDICINE

## 2020-12-08 PROCEDURE — 36593 DECLOT VASCULAR DEVICE: CPT | Performed by: NURSE PRACTITIONER

## 2020-12-08 PROCEDURE — 85025 COMPLETE CBC W/AUTO DIFF WBC: CPT | Performed by: INTERNAL MEDICINE

## 2020-12-08 PROCEDURE — 84100 ASSAY OF PHOSPHORUS: CPT | Performed by: INTERNAL MEDICINE

## 2020-12-08 PROCEDURE — 99207 PR NO CHARGE LOS: CPT

## 2020-12-08 PROCEDURE — 96375 TX/PRO/DX INJ NEW DRUG ADDON: CPT | Performed by: NURSE PRACTITIONER

## 2020-12-08 PROCEDURE — 96367 TX/PROPH/DG ADDL SEQ IV INF: CPT | Performed by: NURSE PRACTITIONER

## 2020-12-08 PROCEDURE — 83735 ASSAY OF MAGNESIUM: CPT | Performed by: INTERNAL MEDICINE

## 2020-12-08 RX ORDER — ACETAMINOPHEN 325 MG/1
650 TABLET ORAL ONCE
Status: COMPLETED | OUTPATIENT
Start: 2020-12-08 | End: 2020-12-08

## 2020-12-08 RX ORDER — HEPARIN SODIUM (PORCINE) LOCK FLUSH IV SOLN 100 UNIT/ML 100 UNIT/ML
5 SOLUTION INTRAVENOUS
Status: DISCONTINUED | OUTPATIENT
Start: 2020-12-08 | End: 2020-12-10 | Stop reason: HOSPADM

## 2020-12-08 RX ORDER — HEPARIN SODIUM (PORCINE) LOCK FLUSH IV SOLN 100 UNIT/ML 100 UNIT/ML
5 SOLUTION INTRAVENOUS
Status: DISCONTINUED | OUTPATIENT
Start: 2020-12-08 | End: 2020-12-08 | Stop reason: HOSPADM

## 2020-12-08 RX ORDER — DIPHENHYDRAMINE HCL 25 MG
50 CAPSULE ORAL ONCE
Status: COMPLETED | OUTPATIENT
Start: 2020-12-08 | End: 2020-12-08

## 2020-12-08 RX ADMIN — Medication 2 MG: at 14:28

## 2020-12-08 RX ADMIN — ACETAMINOPHEN 650 MG: 325 TABLET ORAL at 14:59

## 2020-12-08 RX ADMIN — HEPARIN SODIUM (PORCINE) LOCK FLUSH IV SOLN 100 UNIT/ML 5 ML: 100 SOLUTION at 17:41

## 2020-12-08 RX ADMIN — Medication 500 ML: at 15:16

## 2020-12-08 RX ADMIN — Medication 50 MG: at 14:59

## 2020-12-08 RX ADMIN — HEPARIN SODIUM (PORCINE) LOCK FLUSH IV SOLN 100 UNIT/ML 5 ML: 100 SOLUTION at 14:12

## 2020-12-08 ASSESSMENT — PAIN SCALES - GENERAL: PAINLEVEL: EXTREME PAIN (8)

## 2020-12-08 NOTE — PROGRESS NOTES
"Patient's name and  were verified.  See Doc Flowsheet - IV assess for details.  IVAD accessed with 20G  1\" mcnair gripper plus needle  blood return positive: TPA instilled  Site without redness, tenderness or swelling: YES  flushed with 30cc NS and 5cc 100u/ml heparin  Needle: Left accessed for infusion    Comments: Labs drawn.  Patient tolerated procedure without incident.    Catarina Mendoza  RN, BSN, OCN        "

## 2020-12-08 NOTE — PROGRESS NOTES
Infusion Nursing Note:  Shaneka Alvarez presents today for C2D8 Trodelvy.    Patient seen by provider today: No   present during visit today: Not Applicable.    Note: Patient reports on-going nausea and vomiting over the last week.  Has been taking Zofran and Compazine with acceptable results.  Little oral intake the last few days.  Patient appeared pale and ill-looking upon first arriving to infusion area.  Discussed with Annie Bailon, NP- [facility] ok for 500 mL fluids today.  After pre-meds infused, pt reported feeling a bit better.      No blood return noted when port accessed.  TPA instilled for 40 minutes, at which time brisk blood return noted.     Intravenous Access:  Implanted Port.    Treatment Conditions:  Lab Results   Component Value Date    HGB 15.0 12/08/2020     Lab Results   Component Value Date    WBC 4.1 12/08/2020      Lab Results   Component Value Date    ANEU 2.9 12/08/2020     Lab Results   Component Value Date     12/08/2020      Lab Results   Component Value Date     12/08/2020                   Lab Results   Component Value Date    POTASSIUM 3.4 12/08/2020           Lab Results   Component Value Date    MAG 2.5 12/08/2020            Lab Results   Component Value Date    CR 0.65 12/08/2020                   Lab Results   Component Value Date    RAFAELA 9.3 12/08/2020                Lab Results   Component Value Date    BILITOTAL 0.6 12/08/2020           Lab Results   Component Value Date    ALBUMIN 4.0 12/08/2020                    Lab Results   Component Value Date    ALT 32 12/08/2020           Lab Results   Component Value Date    AST 23 12/08/2020       Results reviewed, labs MET treatment parameters, ok to proceed with treatment.    Post Infusion Assessment:  Patient tolerated infusion without incident.  Patient observed for 30 minutes post Trodelvy per protocol.  Blood return noted pre and post infusion.  Site patent and intact, free from redness, edema or  discomfort.  No evidence of extravasations.  Access discontinued per protocol.       Discharge Plan:   Patient will return 12/22/2020 for next appointment.   Patient discharged in stable condition accompanied by: self.  Departure Mode: Ambulatory.    Joanne Ludwig RN-BSN, PHN, OCN  [facility]

## 2020-12-15 ENCOUNTER — HOSPITAL ENCOUNTER (OUTPATIENT)
Facility: CLINIC | Age: 60
Setting detail: OBSERVATION
Discharge: HOME | End: 2020-12-16
Attending: EMERGENCY MEDICINE | Admitting: FAMILY MEDICINE
Payer: COMMERCIAL

## 2020-12-15 ENCOUNTER — APPOINTMENT (OUTPATIENT)
Dept: CT IMAGING | Facility: CLINIC | Age: 60
End: 2020-12-15
Attending: EMERGENCY MEDICINE
Payer: COMMERCIAL

## 2020-12-15 DIAGNOSIS — E87.6 HYPOKALEMIA: ICD-10-CM

## 2020-12-15 DIAGNOSIS — T45.1X5A CHEMOTHERAPY INDUCED NAUSEA AND VOMITING: ICD-10-CM

## 2020-12-15 DIAGNOSIS — R11.2 CHEMOTHERAPY INDUCED NAUSEA AND VOMITING: ICD-10-CM

## 2020-12-15 DIAGNOSIS — Z20.828 VIRAL DISEASE EXPOSURE: ICD-10-CM

## 2020-12-15 DIAGNOSIS — R11.2 NON-INTRACTABLE VOMITING WITH NAUSEA, UNSPECIFIED VOMITING TYPE: ICD-10-CM

## 2020-12-15 DIAGNOSIS — R00.0 SINUS TACHYCARDIA: ICD-10-CM

## 2020-12-15 PROBLEM — E86.0 DEHYDRATION: Status: ACTIVE | Noted: 2020-12-15

## 2020-12-15 LAB
ALBUMIN SERPL-MCNC: 4.2 G/DL (ref 3.4–5)
ALBUMIN UR-MCNC: NEGATIVE MG/DL
ALP SERPL-CCNC: 105 U/L (ref 40–150)
ALT SERPL W P-5'-P-CCNC: 23 U/L (ref 0–50)
ANION GAP SERPL CALCULATED.3IONS-SCNC: 19 MMOL/L (ref 3–14)
APPEARANCE UR: CLEAR
AST SERPL W P-5'-P-CCNC: 17 U/L (ref 0–45)
BASOPHILS # BLD AUTO: 0 10E9/L (ref 0–0.2)
BASOPHILS NFR BLD AUTO: 0.5 %
BILIRUB SERPL-MCNC: 0.7 MG/DL (ref 0.2–1.3)
BILIRUB UR QL STRIP: NEGATIVE
BUN SERPL-MCNC: 23 MG/DL (ref 7–30)
CALCIUM SERPL-MCNC: 8.9 MG/DL (ref 8.5–10.1)
CHLORIDE SERPL-SCNC: 93 MMOL/L (ref 94–109)
CO2 SERPL-SCNC: 21 MMOL/L (ref 20–32)
COLOR UR AUTO: YELLOW
CREAT SERPL-MCNC: 0.81 MG/DL (ref 0.52–1.04)
CRP SERPL-MCNC: 5.3 MG/L (ref 0–8)
DIFFERENTIAL METHOD BLD: ABNORMAL
EOSINOPHIL NFR BLD AUTO: 0 %
ERYTHROCYTE [DISTWIDTH] IN BLOOD BY AUTOMATED COUNT: 14.1 % (ref 10–15)
GFR SERPL CREATININE-BSD FRML MDRD: 80 ML/MIN/{1.73_M2}
GLUCOSE SERPL-MCNC: 223 MG/DL (ref 70–99)
GLUCOSE UR STRIP-MCNC: NEGATIVE MG/DL
HCT VFR BLD AUTO: 39.4 % (ref 35–47)
HGB BLD-MCNC: 13.8 G/DL (ref 11.7–15.7)
HGB UR QL STRIP: NEGATIVE
IMM GRANULOCYTES # BLD: 0 10E9/L (ref 0–0.4)
IMM GRANULOCYTES NFR BLD: 0.5 %
KETONES UR STRIP-MCNC: 20 MG/DL
LACTATE BLD-SCNC: 1.4 MMOL/L (ref 0.7–2)
LACTATE BLD-SCNC: 5 MMOL/L (ref 0.7–2)
LEUKOCYTE ESTERASE UR QL STRIP: NEGATIVE
LYMPHOCYTES # BLD AUTO: 0.4 10E9/L (ref 0.8–5.3)
LYMPHOCYTES NFR BLD AUTO: 18 %
MCH RBC QN AUTO: 28.6 PG (ref 26.5–33)
MCHC RBC AUTO-ENTMCNC: 35 G/DL (ref 31.5–36.5)
MCV RBC AUTO: 82 FL (ref 78–100)
MONOCYTES # BLD AUTO: 0.5 10E9/L (ref 0–1.3)
MONOCYTES NFR BLD AUTO: 21.7 %
NEUTROPHILS # BLD AUTO: 1.3 10E9/L (ref 1.6–8.3)
NEUTROPHILS NFR BLD AUTO: 59.3 %
NITRATE UR QL: NEGATIVE
NRBC # BLD AUTO: 0 10*3/UL
NRBC BLD AUTO-RTO: 0 /100
PH UR STRIP: 7 PH (ref 5–7)
PLATELET # BLD AUTO: 301 10E9/L (ref 150–450)
PLATELET # BLD EST: ABNORMAL 10*3/UL
POTASSIUM SERPL-SCNC: 2.8 MMOL/L (ref 3.4–5.3)
POTASSIUM SERPL-SCNC: 3.2 MMOL/L (ref 3.4–5.3)
POTASSIUM SERPL-SCNC: 3.3 MMOL/L (ref 3.4–5.3)
PROCALCITONIN SERPL-MCNC: <0.05 NG/ML
PROT SERPL-MCNC: 8.4 G/DL (ref 6.8–8.8)
RBC # BLD AUTO: 4.83 10E12/L (ref 3.8–5.2)
RBC #/AREA URNS AUTO: 3 /HPF
RBC MORPH BLD: ABNORMAL
SARS-COV-2 RNA SPEC QL NAA+PROBE: NOT DETECTED
SODIUM SERPL-SCNC: 133 MMOL/L (ref 133–144)
SOURCE: ABNORMAL
SP GR UR STRIP: 1.04 (ref 1–1.03)
SPECIMEN SOURCE: NORMAL
UROBILINOGEN UR STRIP-MCNC: 0 MG/DL (ref 0–2)
WBC # BLD AUTO: 2.2 10E9/L (ref 4–11)
WBC #/AREA URNS AUTO: 2 /HPF

## 2020-12-15 PROCEDURE — 80053 COMPREHEN METABOLIC PANEL: CPT | Performed by: EMERGENCY MEDICINE

## 2020-12-15 PROCEDURE — 36415 COLL VENOUS BLD VENIPUNCTURE: CPT | Performed by: EMERGENCY MEDICINE

## 2020-12-15 PROCEDURE — 84145 PROCALCITONIN (PCT): CPT | Performed by: NURSE PRACTITIONER

## 2020-12-15 PROCEDURE — 96366 THER/PROPH/DIAG IV INF ADDON: CPT | Performed by: EMERGENCY MEDICINE

## 2020-12-15 PROCEDURE — 84132 ASSAY OF SERUM POTASSIUM: CPT | Mod: 91 | Performed by: FAMILY MEDICINE

## 2020-12-15 PROCEDURE — C9803 HOPD COVID-19 SPEC COLLECT: HCPCS | Performed by: EMERGENCY MEDICINE

## 2020-12-15 PROCEDURE — 258N000003 HC RX IP 258 OP 636: Performed by: EMERGENCY MEDICINE

## 2020-12-15 PROCEDURE — 96365 THER/PROPH/DIAG IV INF INIT: CPT | Mod: 59 | Performed by: EMERGENCY MEDICINE

## 2020-12-15 PROCEDURE — 86140 C-REACTIVE PROTEIN: CPT | Performed by: EMERGENCY MEDICINE

## 2020-12-15 PROCEDURE — 84132 ASSAY OF SERUM POTASSIUM: CPT | Mod: 91 | Performed by: EMERGENCY MEDICINE

## 2020-12-15 PROCEDURE — 96375 TX/PRO/DX INJ NEW DRUG ADDON: CPT | Performed by: EMERGENCY MEDICINE

## 2020-12-15 PROCEDURE — 93010 ELECTROCARDIOGRAM REPORT: CPT | Performed by: EMERGENCY MEDICINE

## 2020-12-15 PROCEDURE — 87040 BLOOD CULTURE FOR BACTERIA: CPT | Performed by: EMERGENCY MEDICINE

## 2020-12-15 PROCEDURE — 71275 CT ANGIOGRAPHY CHEST: CPT

## 2020-12-15 PROCEDURE — 99285 EMERGENCY DEPT VISIT HI MDM: CPT | Mod: 25 | Performed by: EMERGENCY MEDICINE

## 2020-12-15 PROCEDURE — 96361 HYDRATE IV INFUSION ADD-ON: CPT

## 2020-12-15 PROCEDURE — U0003 INFECTIOUS AGENT DETECTION BY NUCLEIC ACID (DNA OR RNA); SEVERE ACUTE RESPIRATORY SYNDROME CORONAVIRUS 2 (SARS-COV-2) (CORONAVIRUS DISEASE [COVID-19]), AMPLIFIED PROBE TECHNIQUE, MAKING USE OF HIGH THROUGHPUT TECHNOLOGIES AS DESCRIBED BY CMS-2020-01-R: HCPCS | Performed by: EMERGENCY MEDICINE

## 2020-12-15 PROCEDURE — G0378 HOSPITAL OBSERVATION PER HR: HCPCS

## 2020-12-15 PROCEDURE — 250N000013 HC RX MED GY IP 250 OP 250 PS 637: Performed by: FAMILY MEDICINE

## 2020-12-15 PROCEDURE — 81001 URINALYSIS AUTO W/SCOPE: CPT | Performed by: NURSE PRACTITIONER

## 2020-12-15 PROCEDURE — 250N000011 HC RX IP 250 OP 636: Performed by: FAMILY MEDICINE

## 2020-12-15 PROCEDURE — 96361 HYDRATE IV INFUSION ADD-ON: CPT | Performed by: EMERGENCY MEDICINE

## 2020-12-15 PROCEDURE — 258N000003 HC RX IP 258 OP 636: Performed by: NURSE PRACTITIONER

## 2020-12-15 PROCEDURE — 96376 TX/PRO/DX INJ SAME DRUG ADON: CPT | Performed by: EMERGENCY MEDICINE

## 2020-12-15 PROCEDURE — 83605 ASSAY OF LACTIC ACID: CPT | Performed by: EMERGENCY MEDICINE

## 2020-12-15 PROCEDURE — 250N000011 HC RX IP 250 OP 636: Performed by: EMERGENCY MEDICINE

## 2020-12-15 PROCEDURE — 96376 TX/PRO/DX INJ SAME DRUG ADON: CPT | Mod: XU

## 2020-12-15 PROCEDURE — 99207 PR CDG-HISTORY COMPONENT: MEETS DETAILED - DOWN CODED LACK OF ROS: CPT | Performed by: NURSE PRACTITIONER

## 2020-12-15 PROCEDURE — 93005 ELECTROCARDIOGRAM TRACING: CPT | Performed by: EMERGENCY MEDICINE

## 2020-12-15 PROCEDURE — 99218 PR INITIAL OBSERVATION CARE,LEVEL I: CPT | Performed by: NURSE PRACTITIONER

## 2020-12-15 PROCEDURE — 85025 COMPLETE CBC W/AUTO DIFF WBC: CPT | Performed by: EMERGENCY MEDICINE

## 2020-12-15 PROCEDURE — 250N000011 HC RX IP 250 OP 636: Performed by: NURSE PRACTITIONER

## 2020-12-15 PROCEDURE — 250N000013 HC RX MED GY IP 250 OP 250 PS 637: Performed by: NURSE PRACTITIONER

## 2020-12-15 PROCEDURE — 250N000009 HC RX 250: Performed by: EMERGENCY MEDICINE

## 2020-12-15 RX ORDER — HEPARIN SODIUM (PORCINE) LOCK FLUSH IV SOLN 100 UNIT/ML 100 UNIT/ML
5 SOLUTION INTRAVENOUS
Status: DISCONTINUED | OUTPATIENT
Start: 2020-12-15 | End: 2020-12-16 | Stop reason: HOSPADM

## 2020-12-15 RX ORDER — POLYETHYLENE GLYCOL 3350 17 G/17G
17 POWDER, FOR SOLUTION ORAL DAILY PRN
Status: DISCONTINUED | OUTPATIENT
Start: 2020-12-15 | End: 2020-12-16 | Stop reason: HOSPADM

## 2020-12-15 RX ORDER — SODIUM CHLORIDE 9 MG/ML
INJECTION, SOLUTION INTRAVENOUS ONCE
Status: COMPLETED | OUTPATIENT
Start: 2020-12-15 | End: 2020-12-15

## 2020-12-15 RX ORDER — POTASSIUM CHLORIDE 1500 MG/1
20 TABLET, EXTENDED RELEASE ORAL ONCE
Status: COMPLETED | OUTPATIENT
Start: 2020-12-15 | End: 2020-12-15

## 2020-12-15 RX ORDER — HEPARIN SODIUM,PORCINE 10 UNIT/ML
5-10 VIAL (ML) INTRAVENOUS EVERY 24 HOURS
Status: DISCONTINUED | OUTPATIENT
Start: 2020-12-15 | End: 2020-12-16 | Stop reason: HOSPADM

## 2020-12-15 RX ORDER — IOPAMIDOL 755 MG/ML
500 INJECTION, SOLUTION INTRAVASCULAR ONCE
Status: COMPLETED | OUTPATIENT
Start: 2020-12-15 | End: 2020-12-15

## 2020-12-15 RX ORDER — POTASSIUM CHLORIDE 7.45 MG/ML
10 INJECTION INTRAVENOUS
Status: COMPLETED | OUTPATIENT
Start: 2020-12-15 | End: 2020-12-15

## 2020-12-15 RX ORDER — LIDOCAINE 40 MG/G
CREAM TOPICAL
Status: DISCONTINUED | OUTPATIENT
Start: 2020-12-15 | End: 2020-12-16 | Stop reason: HOSPADM

## 2020-12-15 RX ORDER — POTASSIUM CHLORIDE 7.45 MG/ML
10 INJECTION INTRAVENOUS
Status: DISPENSED | OUTPATIENT
Start: 2020-12-15 | End: 2020-12-15

## 2020-12-15 RX ORDER — MECLIZINE HCL 12.5 MG 12.5 MG/1
25 TABLET ORAL 3 TIMES DAILY PRN
Status: DISCONTINUED | OUTPATIENT
Start: 2020-12-15 | End: 2020-12-16 | Stop reason: HOSPADM

## 2020-12-15 RX ORDER — SODIUM CHLORIDE 9 MG/ML
INJECTION, SOLUTION INTRAVENOUS CONTINUOUS
Status: DISCONTINUED | OUTPATIENT
Start: 2020-12-15 | End: 2020-12-16 | Stop reason: HOSPADM

## 2020-12-15 RX ORDER — HYDROMORPHONE HYDROCHLORIDE 1 MG/ML
0.5 INJECTION, SOLUTION INTRAMUSCULAR; INTRAVENOUS; SUBCUTANEOUS
Status: COMPLETED | OUTPATIENT
Start: 2020-12-15 | End: 2020-12-15

## 2020-12-15 RX ORDER — POTASSIUM CHLORIDE 7.45 MG/ML
10 INJECTION INTRAVENOUS CONTINUOUS
Status: DISCONTINUED | OUTPATIENT
Start: 2020-12-15 | End: 2020-12-15

## 2020-12-15 RX ORDER — SODIUM CHLORIDE 9 MG/ML
INJECTION, SOLUTION INTRAVENOUS CONTINUOUS
Status: DISCONTINUED | OUTPATIENT
Start: 2020-12-15 | End: 2020-12-15

## 2020-12-15 RX ORDER — LORAZEPAM 2 MG/ML
1 INJECTION INTRAMUSCULAR EVERY 4 HOURS PRN
Status: DISCONTINUED | OUTPATIENT
Start: 2020-12-15 | End: 2020-12-16 | Stop reason: HOSPADM

## 2020-12-15 RX ORDER — HYDROMORPHONE HYDROCHLORIDE 1 MG/ML
0.5 INJECTION, SOLUTION INTRAMUSCULAR; INTRAVENOUS; SUBCUTANEOUS
Status: DISCONTINUED | OUTPATIENT
Start: 2020-12-15 | End: 2020-12-16 | Stop reason: HOSPADM

## 2020-12-15 RX ORDER — ACETAMINOPHEN 650 MG/1
650 SUPPOSITORY RECTAL EVERY 4 HOURS PRN
Status: DISCONTINUED | OUTPATIENT
Start: 2020-12-15 | End: 2020-12-16 | Stop reason: HOSPADM

## 2020-12-15 RX ORDER — LORAZEPAM 2 MG/ML
1 INJECTION INTRAMUSCULAR
Status: COMPLETED | OUTPATIENT
Start: 2020-12-15 | End: 2020-12-15

## 2020-12-15 RX ORDER — HEPARIN SODIUM,PORCINE 10 UNIT/ML
5-10 VIAL (ML) INTRAVENOUS
Status: DISCONTINUED | OUTPATIENT
Start: 2020-12-15 | End: 2020-12-16 | Stop reason: HOSPADM

## 2020-12-15 RX ORDER — PROPRANOLOL HYDROCHLORIDE 80 MG/1
80 CAPSULE, EXTENDED RELEASE ORAL EVERY MORNING
COMMUNITY
Start: 2020-10-22 | End: 2021-02-18

## 2020-12-15 RX ORDER — ACETAMINOPHEN 325 MG/1
650 TABLET ORAL EVERY 4 HOURS PRN
Status: DISCONTINUED | OUTPATIENT
Start: 2020-12-15 | End: 2020-12-16 | Stop reason: HOSPADM

## 2020-12-15 RX ORDER — TRAZODONE HYDROCHLORIDE 50 MG/1
50 TABLET, FILM COATED ORAL AT BEDTIME
Status: DISCONTINUED | OUTPATIENT
Start: 2020-12-15 | End: 2020-12-16 | Stop reason: HOSPADM

## 2020-12-15 RX ORDER — LORAZEPAM 2 MG/ML
1 INJECTION INTRAMUSCULAR ONCE
Status: COMPLETED | OUTPATIENT
Start: 2020-12-15 | End: 2020-12-15

## 2020-12-15 RX ORDER — MORPHINE SULFATE 15 MG/1
15 TABLET, FILM COATED, EXTENDED RELEASE ORAL EVERY EVENING
Status: DISCONTINUED | OUTPATIENT
Start: 2020-12-15 | End: 2020-12-16 | Stop reason: HOSPADM

## 2020-12-15 RX ORDER — PROPRANOLOL HYDROCHLORIDE 80 MG/1
80 CAPSULE, EXTENDED RELEASE ORAL DAILY
Status: DISCONTINUED | OUTPATIENT
Start: 2020-12-15 | End: 2020-12-16 | Stop reason: HOSPADM

## 2020-12-15 RX ORDER — BUSPIRONE HYDROCHLORIDE 5 MG/1
15 TABLET ORAL 2 TIMES DAILY
Status: DISCONTINUED | OUTPATIENT
Start: 2020-12-15 | End: 2020-12-16 | Stop reason: HOSPADM

## 2020-12-15 RX ORDER — VENLAFAXINE HYDROCHLORIDE 75 MG/1
225 CAPSULE, EXTENDED RELEASE ORAL EVERY MORNING
COMMUNITY
Start: 2020-10-22 | End: 2021-02-18

## 2020-12-15 RX ADMIN — SODIUM CHLORIDE: 9 INJECTION, SOLUTION INTRAVENOUS at 11:43

## 2020-12-15 RX ADMIN — HYDROMORPHONE HYDROCHLORIDE 0.5 MG: 1 INJECTION, SOLUTION INTRAMUSCULAR; INTRAVENOUS; SUBCUTANEOUS at 09:30

## 2020-12-15 RX ADMIN — POTASSIUM CHLORIDE 10 MEQ: 7.46 INJECTION, SOLUTION INTRAVENOUS at 14:12

## 2020-12-15 RX ADMIN — LORAZEPAM 1 MG: 2 INJECTION INTRAMUSCULAR; INTRAVENOUS at 10:30

## 2020-12-15 RX ADMIN — HYDROMORPHONE HYDROCHLORIDE 0.5 MG: 1 INJECTION, SOLUTION INTRAMUSCULAR; INTRAVENOUS; SUBCUTANEOUS at 10:31

## 2020-12-15 RX ADMIN — POTASSIUM CHLORIDE 20 MEQ: 1500 TABLET, EXTENDED RELEASE ORAL at 20:20

## 2020-12-15 RX ADMIN — LORAZEPAM 1 MG: 2 INJECTION INTRAMUSCULAR; INTRAVENOUS at 09:29

## 2020-12-15 RX ADMIN — VENLAFAXINE HYDROCHLORIDE 225 MG: 150 CAPSULE, EXTENDED RELEASE ORAL at 16:45

## 2020-12-15 RX ADMIN — PROPRANOLOL HYDROCHLORIDE 80 MG: 80 CAPSULE, EXTENDED RELEASE ORAL at 17:01

## 2020-12-15 RX ADMIN — MECLIZINE HYDROCHLORIDE 25 MG: 12.5 TABLET, FILM COATED ORAL at 20:02

## 2020-12-15 RX ADMIN — LORAZEPAM 1 MG: 2 INJECTION INTRAMUSCULAR; INTRAVENOUS at 09:50

## 2020-12-15 RX ADMIN — POTASSIUM CHLORIDE 10 MEQ: 7.46 INJECTION, SOLUTION INTRAVENOUS at 17:01

## 2020-12-15 RX ADMIN — BUSPIRONE HYDROCHLORIDE 15 MG: 5 TABLET ORAL at 20:02

## 2020-12-15 RX ADMIN — SODIUM CHLORIDE: 9 INJECTION, SOLUTION INTRAVENOUS at 21:37

## 2020-12-15 RX ADMIN — SODIUM CHLORIDE 1000 ML: 9 INJECTION, SOLUTION INTRAVENOUS at 09:31

## 2020-12-15 RX ADMIN — TRAZODONE HYDROCHLORIDE 50 MG: 50 TABLET ORAL at 20:20

## 2020-12-15 RX ADMIN — SODIUM CHLORIDE: 9 INJECTION, SOLUTION INTRAVENOUS at 10:27

## 2020-12-15 RX ADMIN — APIXABAN 5 MG: 5 TABLET, FILM COATED ORAL at 20:02

## 2020-12-15 RX ADMIN — SODIUM CHLORIDE 70 ML: 9 INJECTION, SOLUTION INTRAVENOUS at 15:30

## 2020-12-15 RX ADMIN — MORPHINE SULFATE 15 MG: 15 TABLET, EXTENDED RELEASE ORAL at 20:02

## 2020-12-15 RX ADMIN — HYDROMORPHONE HYDROCHLORIDE 0.5 MG: 1 INJECTION, SOLUTION INTRAMUSCULAR; INTRAVENOUS; SUBCUTANEOUS at 09:51

## 2020-12-15 RX ADMIN — LORAZEPAM 1 MG: 2 INJECTION INTRAMUSCULAR; INTRAVENOUS at 22:14

## 2020-12-15 RX ADMIN — POTASSIUM CHLORIDE 10 MEQ: 7.46 INJECTION, SOLUTION INTRAVENOUS at 10:27

## 2020-12-15 RX ADMIN — IOPAMIDOL 70 ML: 755 INJECTION, SOLUTION INTRAVENOUS at 15:32

## 2020-12-15 RX ADMIN — POTASSIUM CHLORIDE 10 MEQ: 7.46 INJECTION, SOLUTION INTRAVENOUS at 11:44

## 2020-12-15 ASSESSMENT — ACTIVITIES OF DAILY LIVING (ADL)
FALL_HISTORY_WITHIN_LAST_SIX_MONTHS: NO
WEAR_GLASSES_OR_BLIND: YES
DRESSING/BATHING_DIFFICULTY: NO
DIFFICULTY_COMMUNICATING: NO
WALKING_OR_CLIMBING_STAIRS_DIFFICULTY: YES
PATIENT_/_FAMILY_COMMUNICATION_STYLE: SPOKEN LANGUAGE (ENGLISH OR BILINGUAL)
DIFFICULTY_EATING/SWALLOWING: NO
DOING_ERRANDS_INDEPENDENTLY_DIFFICULTY: YES
CONCENTRATING,_REMEMBERING_OR_MAKING_DECISIONS_DIFFICULTY: YES
TOILETING_ISSUES: NO
HEARING_DIFFICULTY_OR_DEAF: NO

## 2020-12-15 NOTE — ED NOTES
CT noted pt was having pain in her L arm IV. RN assessed site, had blood return, and flushed with a 10 mL syringe. Pt states the flush didn't hurt, but underneath the IV did. Site is WDL. No swelling or redness noted. New tegaderm and tape placed. Notified provider and M/S primary RN.

## 2020-12-15 NOTE — ED PROVIDER NOTES
History     Chief Complaint   Patient presents with     Nausea & Vomiting     HPI  Shaneka Alvarez is a 60 year old female who presents to the ER for nausea and vomiting.  She has metastatic breast cancer, and is undergoing chemotherapy infusions.  Her last infusion was 1 week ago.  For the last few days she has had persistent nausea and vomiting, and cannot keep anything down.  She feels dehydrated.  She has not been running a fever, does not have any abdominal pain, no cough, no chest pain.  She has not been able to take any medications at home for the last few days due to the persistent vomiting.  Patient is anxious and crying, continues to try and drink water despite it causing her to vomit.      Allergies:  Allergies   Allergen Reactions     Bactrim [Sulfamethoxazole W/Trimethoprim]      Internal bleeding     Copaxone [Glatiramer] Hives       Problem List:    Patient Active Problem List    Diagnosis Date Noted     Chemotherapy-induced neutropenia (H) 11/10/2020     Priority: Medium     Pulmonary embolism without acute cor pulmonale, unspecified chronicity, unspecified pulmonary embolism type (H) 08/17/2020     Priority: Medium     Hypokalemia 07/28/2020     Priority: Medium     Bone metastases (H) 10/22/2019     Priority: Medium     Metastatic breast cancer (H) 09/18/2019     Priority: Medium     Metastatic disease (H) 08/31/2019     Priority: Medium     S/P breast reconstruction, bilateral 07/31/2018     Priority: Medium     Anxiety and depression 10/25/2017     Priority: Medium     Hx of breast cancer 10/25/2017     Priority: Medium     Major depressive disorder, recurrent episode, moderate (H) 04/07/2016     Priority: Medium     Anxiety 03/12/2016     Priority: Medium     Migraine without aura and without status migrainosus, not intractable 10/23/2015     Priority: Medium     Obesity, Class II, BMI 35-39.9 10/23/2015     Priority: Medium     Multiple sclerosis (H) 08/11/2015     Priority: Medium      CARDIOVASCULAR SCREENING; LDL GOAL LESS THAN 160 2015     Priority: Medium     Raynaud's syndrome 2015     Priority: Medium     Breast cancer (H) 2015     Priority: Medium     Age 44       Complication of anesthesia 2015     Priority: Medium     Arthritis 2015     Priority: Medium     Autoimmune disorder (H) 2015     Priority: Medium     Other insomnia 2015     Priority: Medium     Medication management contract agreement 2015     Priority: Medium     Ambien 12.5 mg, dispense 30 per month, follow up every 6 months        Neurogenic bladder 2014     Priority: Medium     Vision changes 2014     Priority: Medium     Demyelinating disorder (H) 2014     Priority: Medium     Weakness 2014     Priority: Medium     GERD (gastroesophageal reflux disease) 10/22/2014     Priority: Medium     History of breast cancer 10/22/2014     Priority: Medium     Overview:   stage 3, diagnosed in  s/p double mastectomy, chemo and radiation.        Migraine 10/22/2014     Priority: Medium        Past Medical History:    Past Medical History:   Diagnosis Date     Breast cancer (H)      Common migraine      H/O bilateral mastectomy      Mild major depression (H)      Multiple sclerosis (H)        Past Surgical History:    Past Surgical History:   Procedure Laterality Date     ENDOBRONCHIAL ULTRASOUND FLEXIBLE N/A 2019    Procedure: Flexible Bronchoscopy, Endobronchial Ultrasound, Radial Probe;  Surgeon: Sree Sanchez MD;  Location: [facility] OR      REMOVAL OF OVARY/TUBE(S)       HERNIA REPAIR, UMBILICAL       mastectomy  Bilateral 2006     MASTECTOMY, BILATERAL         Family History:    Family History   Problem Relation Age of Onset     Breast Cancer Maternal Aunt 50         at 85     Breast Cancer Cousin 40     Breast Cancer Mother 49        2nd primary, contralateral breast at 69;  at 86     Melanoma Mother      Breast Cancer Maternal Grandmother  "40     Ovarian Cancer Maternal Grandmother      Breast Cancer Paternal Grandmother 50         at 60     Brain Cancer Cousin 20     Breast Cancer Paternal Aunt 50         at 60     Breast Cancer Cousin 45        paternal cousin       Social History:  Marital Status:   [2]  Social History     Tobacco Use     Smoking status: Former Smoker     Types: Cigarettes     Quit date: 2005     Years since quitting: 15.0     Smokeless tobacco: Never Used   Substance Use Topics     Alcohol use: Yes     Alcohol/week: 2.0 standard drinks     Types: 1 Glasses of wine, 1 Cans of beer per week     Comment: TWICE A WEEK     Drug use: No        Medications:         acetaminophen (TYLENOL) 500 MG tablet       apixaban ANTICOAGULANT (ELIQUIS) 5 MG tablet       busPIRone (BUSPAR) 15 MG tablet       calcium citrate-vitamin D (CITRACAL) 315-250 MG-UNIT TABS       Cholecalciferol (VITAMIN D3 PO)       cyclopentolate (CYCLOGYL) 1 % ophthalmic solution       diclofenac (VOLTAREN) 1 % topical gel       ibuprofen (ADVIL/MOTRIN) 600 MG tablet       loperamide (IMODIUM) 2 MG capsule       LORazepam (ATIVAN) 0.5 MG tablet       LORazepam (ATIVAN) 0.5 MG tablet       magnesium 250 MG tablet       morphine (MS CONTIN) 15 MG CR tablet       morphine (MSIR) 15 MG IR tablet       naloxone (NARCAN) 1 mg/mL for intranasal kit (2 syringes with 2 mucosal atomizer device)       ondansetron (ZOFRAN) 8 MG tablet       ondansetron (ZOFRAN-ODT) 8 MG ODT tab       polyethylene glycol (MIRALAX/GLYCOLAX) packet       potassium & sodium phosphates (NEUTRA-PHOS) 280-160-250 MG Packet       prochlorperazine (COMPAZINE) 10 MG tablet       propranolol (INDERAL LA) 60 MG 24 hr capsule       senna-docusate (SENOKOT-S/PERICOLACE) 8.6-50 MG tablet       syringe/needle, disp, (B-D [employer] SYRINGE) 23G X 1\" 3 ML MISC       traZODone (DESYREL) 50 MG tablet       venlafaxine (EFFEXOR-ER) 225 MG TB24 24 hr tablet          Review of Systems   All other systems " reviewed and are negative.      Physical Exam   Pulse: 118  Resp: 20  Weight: 55.3 kg (122 lb)  SpO2: 97 %      Physical Exam  Vitals signs and nursing note reviewed.   Constitutional:       Appearance: She is ill-appearing.   HENT:      Head: Normocephalic and atraumatic.      Mouth/Throat:      Mouth: Mucous membranes are moist.   Neck:      Musculoskeletal: Normal range of motion and neck supple.   Cardiovascular:      Rate and Rhythm: Tachycardia present.   Pulmonary:      Effort: Pulmonary effort is normal.      Breath sounds: Normal breath sounds.   Abdominal:      Palpations: Abdomen is soft.      Tenderness: There is no abdominal tenderness. There is no guarding or rebound.   Musculoskeletal: Normal range of motion.   Skin:     General: Skin is warm and dry.   Neurological:      Mental Status: She is alert.   Psychiatric:         Mood and Affect: Mood is anxious. Affect is tearful.         ED Course        Procedures               EKG Interpretation:      Interpreted by Arlette Keller DO  Time reviewed: 1400  Symptoms at time of EKG: vomiting   Rhythm: sinus tachycardia  Rate: Tachycardia and 130 bpm  Ectopy: none  ST Segments/ T Waves: No acute ischemic changes    Clinical Impression: sinus tachycardia                Critical Care time:  none     The Lactic acid level is elevated due to dehydration and vomiting, at this time there is no sign of severe sepsis or septic shock.         Results for orders placed or performed during the hospital encounter of 12/15/20 (from the past 24 hour(s))   CBC with platelets differential   Result Value Ref Range    WBC 2.2 (L) 4.0 - 11.0 10e9/L    RBC Count 4.83 3.8 - 5.2 10e12/L    Hemoglobin 13.8 11.7 - 15.7 g/dL    Hematocrit 39.4 35.0 - 47.0 %    MCV 82 78 - 100 fl    MCH 28.6 26.5 - 33.0 pg    MCHC 35.0 31.5 - 36.5 g/dL    RDW 14.1 10.0 - 15.0 %    Platelet Count 301 150 - 450 10e9/L    Diff Method Automated Method     % Neutrophils 59.3 %    % Lymphocytes 18.0  %    % Monocytes 21.7 %    % Eosinophils 0.0 %    % Basophils 0.5 %    % Immature Granulocytes 0.5 %    Nucleated RBCs 0 0 /100    Absolute Neutrophil 1.3 (L) 1.6 - 8.3 10e9/L    Absolute Lymphocytes 0.4 (L) 0.8 - 5.3 10e9/L    Absolute Monocytes 0.5 0.0 - 1.3 10e9/L    Absolute Basophils 0.0 0.0 - 0.2 10e9/L    Abs Immature Granulocytes 0.0 0 - 0.4 10e9/L    Absolute Nucleated RBC 0.0     RBC Morphology Consistent with reported results     Platelet Estimate Confirming automated cell count    Comprehensive metabolic panel   Result Value Ref Range    Sodium 133 133 - 144 mmol/L    Potassium 2.8 (L) 3.4 - 5.3 mmol/L    Chloride 93 (L) 94 - 109 mmol/L    Carbon Dioxide 21 20 - 32 mmol/L    Anion Gap 19 (H) 3 - 14 mmol/L    Glucose 223 (H) 70 - 99 mg/dL    Urea Nitrogen 23 7 - 30 mg/dL    Creatinine 0.81 0.52 - 1.04 mg/dL    GFR Estimate 80 >60 mL/min/[1.73_m2]    GFR Estimate If Black >90 >60 mL/min/[1.73_m2]    Calcium 8.9 8.5 - 10.1 mg/dL    Bilirubin Total 0.7 0.2 - 1.3 mg/dL    Albumin 4.2 3.4 - 5.0 g/dL    Protein Total 8.4 6.8 - 8.8 g/dL    Alkaline Phosphatase 105 40 - 150 U/L    ALT 23 0 - 50 U/L    AST 17 0 - 45 U/L   Lactic acid whole blood   Result Value Ref Range    Lactic Acid 5.0 (HH) 0.7 - 2.0 mmol/L   CRP inflammation   Result Value Ref Range    CRP Inflammation 5.3 0.0 - 8.0 mg/L   Blood culture    Specimen: Blood    Port   Result Value Ref Range    Specimen Description Blood Port     Culture Micro No growth after 3 hours    Lactic acid whole blood   Result Value Ref Range    Lactic Acid 1.4 0.7 - 2.0 mmol/L   Potassium   Result Value Ref Range    Potassium 3.3 (L) 3.4 - 5.3 mmol/L   CT Chest Pulmonary Embolism w Contrast    Narrative    CT CHEST PULMONARY EMBOLISM WITH CONTRAST 12/15/2020 3:33 PM    CLINICAL HISTORY: Dyspnea. Nausea and vomiting. History of breast  cancer with bilateral mastectomies and reconstruction.    TECHNIQUE: CT angiogram chest during arterial phase injection IV  contrast. 2D  and 3D MIP reconstructions were performed by the CT  technologist. Dose reduction techniques were used.     CONTRAST: 70 mL Isovue 370     COMPARISON: PET/CT 11/6/2020.    FINDINGS:  ANGIOGRAM CHEST: Pulmonary arteries are normal caliber and negative  for pulmonary emboli. Thoracic aorta is negative for dissection. No CT  evidence of right heart strain.    LUNGS AND PLEURA: Lungs are clear. No infiltrate or consolidation. No  pleural fluid.    MEDIASTINUM/AXILLAE: Heart is normal in size. No pericardial fluid.  Esophagus is unremarkable. Thyroid gland is unremarkable. Left chest  port appears in good position. No enlarged lymph nodes. Calcified  subcarinal nodes consistent with old granulomatous disease. Bilateral  mastectomy changes with implant reconstruction. Implants appear  intact.    UPPER ABDOMEN: Cholecystectomy changes.    MUSCULOSKELETAL: Sclerotic bone lesions are present consistent with  known bone metastases. Old healed probable pathologic fractures  involving the ribs and spine are noted. L1 vertebral body compression  deformity is unchanged.      Impression    IMPRESSION:  1.  No evidence of pulmonary embolism. Thoracic aorta is unremarkable.    2.  Lungs are clear. No enlarged lymph nodes. Evidence of old  granulomatous disease.    3.  Metastatic bone changes stable since recent PET/CT.    NASH SCHMIDT MD   UA with Microscopic reflex to Culture    Specimen: Unspecified Urine   Result Value Ref Range    Color Urine Yellow     Appearance Urine Clear     Glucose Urine Negative NEG^Negative mg/dL    Bilirubin Urine Negative NEG^Negative    Ketones Urine 20 (A) NEG^Negative mg/dL    Specific Gravity Urine 1.041 (H) 1.003 - 1.035    Blood Urine Negative NEG^Negative    pH Urine 7.0 5.0 - 7.0 pH    Protein Albumin Urine Negative NEG^Negative mg/dL    Urobilinogen mg/dL 0.0 0.0 - 2.0 mg/dL    Nitrite Urine Negative NEG^Negative    Leukocyte Esterase Urine Negative NEG^Negative    Source Unspecified Urine      WBC Urine PENDING OTO5^0 - 5 /HPF    RBC Urine PENDING OTO2^O - 2 /HPF       Medications   lidocaine 1 % 0.1-1 mL (has no administration in time range)   lidocaine (LMX4) cream (has no administration in time range)   sodium chloride (PF) 0.9% PF flush 10-20 mL (has no administration in time range)   heparin lock flush 10 UNIT/ML injection 5-10 mL (has no administration in time range)   heparin lock flush 10 UNIT/ML injection 5-10 mL (has no administration in time range)   heparin 100 UNIT/ML injection 5 mL (has no administration in time range)   sodium chloride (PF) 0.9% PF flush 10-20 mL (has no administration in time range)   0.9% sodium chloride BOLUS (has no administration in time range)     Followed by   sodium chloride 0.9% infusion (has no administration in time range)   HYDROmorphone (PF) (DILAUDID) injection 0.5 mg (has no administration in time range)   LORazepam (ATIVAN) injection 1 mg (has no administration in time range)   LORazepam (ATIVAN) injection 1 mg (has no administration in time range)       Assessments & Plan (with Medical Decision Making)  Shaneka is a 60-year-old female with metastatic breast cancer currently undergoing chemotherapy who presents to the ER with persistent nausea and vomiting.  See history and focused physical exam as above  Patient is anxious and tearful on arrival, tachycardic, and retching and vomiting.  Port was accessed to give IV fluids and medication.  She had good response to Ativan and calmed down significantly.  RN did note that when triaging and rooming the patient, she kept asking for water and kept trying to drink water that she brought with her, which would just cause her to vomit.  Kept her n.p.o. to start  Lab results as above.  Lactic acid level is elevated at 5.0, but this is likely due to significant dehydration.  Potassium was low at 2.8, and IV replacement was initiated.  1100 Improved symptoms, remains tachycardic. Will try ice chips and reassess. Pt would  like to be discharged.  Patient does have a leukopenia, and CRP is within normal limits.  Blood cultures were obtained.  Despite being hydrated with 2 L of normal saline, and several doses of IV Ativan, the patient did calm down and was no longer vomiting and said that her nausea was controlled, but remained tachycardic with a heart rate in the 130s.  A CT scan was then performed to evaluate for possible underlying PE, since the patient does have a history of PE and was on Eliquis but unable to take her medication for the last few days due to the vomiting.  CT scan was negative for PE.  EKG showed sinus tachycardia, no arrhythmia.  Called and spoke with Dr. Shara Keller, hospitalist on-call, who accepted the patient for observation status.  We will continue hydration and monitoring as well as electrolyte replacement and antiemetics as necessary.  Plan would be to discharge when patient is tolerating p.o. and vitals stabilized.  Patient is agreeable to this plan.  Did try to call personally and updated the patient's  Kash, but was unreachable on his cell phone.  RN was able to establish contact with , who was updated on the plan and is agreeable.     I have reviewed the nursing notes.    I have reviewed the findings, diagnosis, plan and need for follow up with the patient.       ED to Inpatient Handoff:    Discussed with Dr. Shara Keller at 1400  Patient accepted for Observation Stay  Pending studies include repeat lactic, repeat potassium  Code Status: Not Addressed           Current Discharge Medication List          Final diagnoses:   Non-intractable vomiting with nausea, unspecified vomiting type   Chemotherapy induced nausea and vomiting   Hypokalemia   Sinus tachycardia       12/15/2020   [facility] EMERGENCY DEPT     Arlette Keller DO  12/16/20 1008

## 2020-12-15 NOTE — ED TRIAGE NOTES
Patient presents with severe nausea, vomiting since yesterday or Sunday.She last had chemo at [facility] 1 week ago. She is currently being treated for metastatic breast CA. Idalia Polanco RN

## 2020-12-15 NOTE — ED NOTES
Called pts , Kash, per pts request. Updated him on the plan. Questions answered. No further issues.

## 2020-12-15 NOTE — H&P
[facility]    History and Physical - Hospitalist Service       Date of Admission:  12/15/2020    Assessment & Plan   Shaneka Alvarez is a 60 year old female admitted on 12/15/2020. She presented to the ER with nausea, vomiting and not able to take her medications for a week. Patient states Zofran and Compazine are not working well. She recieved her dose of chemotherapy, C2D8 Trodelvy, on 12/8. Patient has been afebrile, hemodynamically stable. Patient HR has been elevated 130-150 with initial Lactic acid 5.0. Decreased to 1.4 after 2 Liters NS.  Potassium low, 2.8, chronic concern. WBC 2.2, ANC 1.3. CT chest pending. Urine pending. Blood cultures X 2 done. No antibiotics were started. Patient with ongoing nausea, relived with Ativan. Patient was a hard IV stick, she has a PORT,  but it is not a POWER PORT.      Principal Problem:    Dehydration  Active Problems:    Anxiety and depression    Hypokalemia    Pulmonary embolism without acute cor pulmonale, unspecified chronicity, unspecified pulmonary embolism type (H)    Chemotherapy-induced neutropenia (H)    Chemotherapy induced nausea and vomiting    Sinus tachycardia    Non-intractable vomiting with nausea, unspecified vomiting type    Breast cancer (H)    Plan:   Continue IVF  Ativan and Meclizine PRN for Nausea  Morphine and IV Dilaudid for pain  Recheck labs  Add on Procalcitonin  Await CT and urine results  Patient would need to tolerate oral medications and maintain oral hydration for discharge   Restart Buspar, Propranolol, Effexor  Monitor on telemetry         Diet:   Regular as able  DVT Prophylaxis: Eliquis - Lovenox if not able   Huntley Catheter: not present  Code Status:   Full Code  Rule Out COVID-19 Handoff:  Shaneka is a LOW SUSPICION PUI.  Follow these instructions:    If COVID test positive -> continue isolation precautions    If COVID test negative -> discontinue COVID-specific isolation precautions       Disposition  Plan   Expected discharge: 1-2 days, recommended to prior living arrangement once adequate pain management/ tolerating PO medications and nausea improved, tolerating oral intake to maintain hydration.  Entered: Tina Howell CNP 12/15/2020, 2:21 PM     The patient's care was discussed with the Attending Physician, Dr. Keller, Bedside Nurse, Care Coordinator/ and Patient.    Tina Howell CNP  [facility]  Contact information available via [facility] Paging/Directory      ______________________________________________________________________    Chief Complaint   Nausea, Vomiting    History is obtained from the patient, electronic health record and emergency department physician    History of Present Illness   Shaneka Alvarez is a 60 year old female who presented to the ER with nausea, vomiting and not able to take her medications for a week. Patient states Zofran and Compazine are not working well. She recieved her dose of chemotherapy, C2D8 Trodelvy, on 12/8. Patient has been afebrile, hemodynamically stable. Patient HR has been elevated 130-150 with initial Lactic acid 5.0. Decreased to 1.4 after 2 Liters NS.  Potassium low, 2.8, chronic concern. WBC 2.2, ANC 1.3. CT chest pending. Urine pending. Blood cultures X 2 done. No antibiotics were started. Patient with ongoing nausea, relived with Ativan. Patient was a hard IV stick, she has a PORT. EKG shows sinus tachycardia. Possible related to rebound with the stopping of her home medications in combination with dehydration.     Review of Systems    CONSTITUTIONAL: NEGATIVE for fever, chills, change in weight  ENT/MOUTH: NEGATIVE for ear, mouth and throat problems  RESP: NEGATIVE for significant cough or SOB  CV: NEGATIVE for chest pain, palpitations or peripheral edema  GI: POSITIVE for nausea, poor appetite and vomiting  MUSCULOSKELETAL: NEGATIVE for significant arthralgias or myalgia  NEURO: NEGATIVE for  weakness, dizziness or paresthesias  PSYCHIATRIC: NEGATIVE for changes in mood or affect, positive for anxiety/depression    Past Medical History    I have reviewed this patient's medical history and updated it with pertinent information if needed.   Past Medical History:   Diagnosis Date     Breast cancer (H)     Age 44     Chemotherapy induced nausea and vomiting 12/15/2020     Common migraine      H/O bilateral mastectomy      Mild major depression (H)      Multiple sclerosis (H)        Past Surgical History   I have reviewed this patient's surgical history and updated it with pertinent information if needed.  Past Surgical History:   Procedure Laterality Date     ENDOBRONCHIAL ULTRASOUND FLEXIBLE N/A 2019    Procedure: Flexible Bronchoscopy, Endobronchial Ultrasound, Radial Probe;  Surgeon: Sree Sanchez MD;  Location: [facility] OR     HC REMOVAL OF OVARY/TUBE(S)       HERNIA REPAIR, UMBILICAL       mastectomy  Bilateral 2006     MASTECTOMY, BILATERAL         Social History   I have reviewed this patient's social history and updated it with pertinent information if needed.  Social History     Tobacco Use     Smoking status: Former Smoker     Types: Cigarettes     Quit date: 2005     Years since quitting: 15.0     Smokeless tobacco: Never Used   Substance Use Topics     Alcohol use: Yes     Alcohol/week: 2.0 standard drinks     Types: 1 Glasses of wine, 1 Cans of beer per week     Comment: TWICE A WEEK     Drug use: No       Family History   I have reviewed this patient's family history and updated it with pertinent information if needed.  Family History   Problem Relation Age of Onset     Breast Cancer Maternal Aunt 50         at 85     Breast Cancer Cousin 40     Breast Cancer Mother 49        2nd primary, contralateral breast at 69;  at 86     Melanoma Mother      Breast Cancer Maternal Grandmother 40     Ovarian Cancer Maternal Grandmother      Breast Cancer Paternal Grandmother 50          at 60     Brain Cancer Cousin 20     Breast Cancer Paternal Aunt 50         at 60     Breast Cancer Cousin 45        paternal cousin       Prior to Admission Medications   Prior to Admission Medications   Prescriptions Last Dose Informant Patient Reported? Taking?   Cholecalciferol (VITAMIN D3 PO) Past Week at Unknown time Spouse/Significant Other Yes Yes   Sig: Take 2,000 Units by mouth daily   LORazepam (ATIVAN) 0.5 MG tablet Past Month at Unknown time Spouse/Significant Other No Yes   Sig: Take 1 tablet (0.5 mg) by mouth every 4 hours as needed (Anxiety, Nausea/Vomiting or Sleep)   acetaminophen (TYLENOL) 500 MG tablet Unknown at Unknown time Spouse/Significant Other No No   Sig: Take 2 tablets (1,000 mg) by mouth 3 times daily   apixaban ANTICOAGULANT (ELIQUIS) 5 MG tablet Past Week at Unknown time Spouse/Significant Other No Yes   Sig: Take 1 tablet (5 mg) by mouth 2 times daily   busPIRone (BUSPAR) 15 MG tablet Past Week at Unknown time Spouse/Significant Other No Yes   Sig: Take 1 tablet (15 mg) by mouth 2 times daily   calcium citrate-vitamin D (CITRACAL) 315-250 MG-UNIT TABS Past Week at Unknown time Spouse/Significant Other Yes Yes   Sig: Take by mouth daily   cyclopentolate (CYCLOGYL) 1 % ophthalmic solution Past Week at Unknown time Spouse/Significant Other Yes Yes   Sig: INSTILL 1 DROP INTO OU TID UTD   diclofenac (VOLTAREN) 1 % topical gel Past Month at Unknown time Spouse/Significant Other No Yes   Sig: Place 4 g onto the skin 4 times daily . Apply to areas of pain.   ibuprofen (ADVIL/MOTRIN) 600 MG tablet Past Month at Unknown time Spouse/Significant Other No Yes   Sig: Take 1 tablet (600 mg) by mouth 4 times daily as needed for moderate pain   loperamide (IMODIUM) 2 MG capsule Past Month at Unknown time Spouse/Significant Other No Yes   Sig: Start with 2 caps (4 mg), then take one cap (2 mg) after each diarrheal stool as needed. Do not use more than 8 caps (16 mg) per day.   magnesium 250  "MG tablet Past Week at Unknown time Spouse/Significant Other Yes Yes   Sig: Take 1 tablet by mouth daily   morphine (MS CONTIN) 15 MG CR tablet 2020 at hs vomited Spouse/Significant Other No Yes   Sig: Take 1 tablet (15 mg) by mouth every evening   morphine (MSIR) 15 MG IR tablet Past Month at Unknown time Spouse/Significant Other No Yes   Sig: Take 1-2 tablets (15-30 mg) by mouth every 3 hours as needed for severe pain   naloxone (NARCAN) 1 mg/mL for intranasal kit (2 syringes with 2 mucosal atomizer device)  at has it Spouse/Significant Other No Yes   Sig: In opioid overdose put cone in nostril and push 1/2 of contents into each nostril.  Repeat every 3 min if no response until help arrives.   ondansetron (ZOFRAN-ODT) 8 MG ODT tab  Spouse/Significant Other No No   Sig: Take 1 tablet (8 mg) by mouth every 8 hours as needed for nausea   polyethylene glycol (MIRALAX/GLYCOLAX) packet Past Month at Unknown time Spouse/Significant Other No Yes   Sig: Take 17 g by mouth 2 times daily as needed for constipation . Goal is to have a bowel movement every 1-2 days.   potassium & sodium phosphates (NEUTRA-PHOS) 280-160-250 MG Packet Past Month at Unknown time Spouse/Significant Other No Yes   Sig: Take 1 packet by mouth 4 times daily for 28 days   prochlorperazine (COMPAZINE) 10 MG tablet 2020 at pm Spouse/Significant Other No Yes   Sig: Take 1 tablet (10 mg) by mouth every 6 hours as needed (Nausea/Vomiting)   propranolol ER (INDERAL LA) 80 MG 24 hr capsule Past Week at Unknown time Spouse/Significant Other Yes Yes   Sig: Take 80 mg by mouth daily   senna-docusate (SENOKOT-S/PERICOLACE) 8.6-50 MG tablet Past Month at Unknown time Spouse/Significant Other No Yes   Sig: Take 1-2 tablets by mouth 2 times daily as needed for constipation . Goal is to have a bowel movement every 1-2 days.   syringe/needle, disp, (B-D [employer] SYRINGE) 23G X 1\" 3 ML MISC  Spouse/Significant Other No No   Si each as needed   traZODone " (DESYREL) 50 MG tablet Past Week at Unknown time Spouse/Significant Other No Yes   Sig: Take 1 tablet (50 mg) by mouth At Bedtime   venlafaxine (EFFEXOR-XR) 75 MG 24 hr capsule Past Week at Unknown time Spouse/Significant Other Yes Yes   Sig: Take 225 mg by mouth daily      Facility-Administered Medications: None     Allergies   Allergies   Allergen Reactions     Bactrim [Sulfamethoxazole W/Trimethoprim]      Internal bleeding     Copaxone [Glatiramer] Hives       Physical Exam   Vital Signs: Temp: 98.2  F (36.8  C) Temp src: Temporal BP: 117/84 Pulse: 138   Resp: 17 SpO2: 98 % O2 Device: None (Room air)    Weight: 122 lbs 0 oz      Constitutional: awake, alert, cooperative, appears ill, anxious.   Eyes: Lids and lashes normal, pupils equal, round and reactive to light, extra ocular muscles intact, sclera clear, conjunctiva normal  NOSE: Normal without discharge.  ENT: Normocephalic, without obvious abnormality, atraumatic, oral pharynx with dry mucous membranes   Respiratory:  Mild respiratory distress, lungs clear  Cardiovascular: Normal apical impulse,regular rate and rhythm , Tachycardia noted.   GI: Normal bowel sounds, soft, non-distended, non-tender   Skin: normal skin color, texture, turgor  Musculoskeletal: There is no redness, warmth, or swelling of the joints.    Neurologic: Awake, alert, oriented to name, place and time.  Cranial nerves II-XII are grossly intact. No focal findings.        Data   Data reviewed today: I reviewed all medications, new labs and imaging results over the last 24 hours.      Recent Labs   Lab 12/15/20  1425 12/15/20  0920   WBC  --  2.2*   HGB  --  13.8   MCV  --  82   PLT  --  301   NA  --  133   POTASSIUM 3.3* 2.8*   CHLORIDE  --  93*   CO2  --  21   BUN  --  23   CR  --  0.81   ANIONGAP  --  19*   RAFAELA  --  8.9   GLC  --  223*   ALBUMIN  --  4.2   PROTTOTAL  --  8.4   BILITOTAL  --  0.7   ALKPHOS  --  105   ALT  --  23   AST  --  17

## 2020-12-15 NOTE — ED NOTES
ED Nursing criteria listed below was addressed during verbal handoff:     Abnormal vitals: Yes  Abnormal results: Yes  Med Reconciliation completed: Yes  Meds given in ED: Yes  Any Overdue Meds: No  Core Measures: N/A  Isolation: Yes  Special needs: No  Skin assessment: Yes- pt notes no skin issues    Observation Patient  Education provided: Yes

## 2020-12-16 ENCOUNTER — PATIENT OUTREACH (OUTPATIENT)
Dept: ONCOLOGY | Facility: CLINIC | Age: 60
End: 2020-12-16

## 2020-12-16 VITALS
SYSTOLIC BLOOD PRESSURE: 151 MMHG | RESPIRATION RATE: 18 BRPM | HEART RATE: 75 BPM | OXYGEN SATURATION: 100 % | WEIGHT: 127.2 LBS | BODY MASS INDEX: 23.27 KG/M2 | TEMPERATURE: 97.6 F | DIASTOLIC BLOOD PRESSURE: 92 MMHG

## 2020-12-16 LAB
ALBUMIN SERPL-MCNC: 3 G/DL (ref 3.4–5)
ALBUMIN SERPL-MCNC: NORMAL G/DL (ref 3.4–5)
ALP SERPL-CCNC: 70 U/L (ref 40–150)
ALP SERPL-CCNC: NORMAL U/L (ref 40–150)
ALT SERPL W P-5'-P-CCNC: 18 U/L (ref 0–50)
ALT SERPL W P-5'-P-CCNC: NORMAL U/L (ref 0–50)
ANION GAP SERPL CALCULATED.3IONS-SCNC: 5 MMOL/L (ref 3–14)
ANION GAP SERPL CALCULATED.3IONS-SCNC: NORMAL MMOL/L (ref 6–17)
AST SERPL W P-5'-P-CCNC: 17 U/L (ref 0–45)
AST SERPL W P-5'-P-CCNC: NORMAL U/L (ref 0–45)
BASOPHILS NFR BLD AUTO: 2 %
BILIRUB SERPL-MCNC: 0.4 MG/DL (ref 0.2–1.3)
BILIRUB SERPL-MCNC: NORMAL MG/DL (ref 0.2–1.3)
BUN SERPL-MCNC: 6 MG/DL (ref 7–30)
BUN SERPL-MCNC: NORMAL MG/DL (ref 7–30)
CALCIUM SERPL-MCNC: 7 MG/DL (ref 8.5–10.1)
CALCIUM SERPL-MCNC: NORMAL MG/DL (ref 8.5–10.1)
CHLORIDE SERPL-SCNC: 111 MMOL/L (ref 94–109)
CHLORIDE SERPL-SCNC: NORMAL MMOL/L (ref 94–109)
CO2 SERPL-SCNC: 25 MMOL/L (ref 20–32)
CO2 SERPL-SCNC: NORMAL MMOL/L (ref 20–32)
CREAT SERPL-MCNC: 0.41 MG/DL (ref 0.52–1.04)
CREAT SERPL-MCNC: NORMAL MG/DL (ref 0.52–1.04)
DIFFERENTIAL METHOD BLD: ABNORMAL
DIFFERENTIAL METHOD BLD: NORMAL
ERYTHROCYTE [DISTWIDTH] IN BLOOD BY AUTOMATED COUNT: 14.6 % (ref 10–15)
ERYTHROCYTE [DISTWIDTH] IN BLOOD BY AUTOMATED COUNT: NORMAL % (ref 10–15)
GFR SERPL CREATININE-BSD FRML MDRD: >90 ML/MIN/{1.73_M2}
GFR SERPL CREATININE-BSD FRML MDRD: NORMAL ML/MIN/{1.73_M2}
GLUCOSE SERPL-MCNC: 86 MG/DL (ref 70–99)
GLUCOSE SERPL-MCNC: NORMAL MG/DL (ref 70–99)
HCT VFR BLD AUTO: 28 % (ref 35–47)
HCT VFR BLD AUTO: NORMAL % (ref 35–47)
HGB BLD-MCNC: 9.3 G/DL (ref 11.7–15.7)
HGB BLD-MCNC: NORMAL G/DL (ref 11.7–15.7)
LYMPHOCYTES NFR BLD AUTO: 24 %
MCH RBC QN AUTO: 28.3 PG (ref 26.5–33)
MCH RBC QN AUTO: NORMAL PG (ref 26.5–33)
MCHC RBC AUTO-ENTMCNC: 33.2 G/DL (ref 31.5–36.5)
MCHC RBC AUTO-ENTMCNC: NORMAL G/DL (ref 31.5–36.5)
MCV RBC AUTO: 85 FL (ref 78–100)
MCV RBC AUTO: NORMAL FL (ref 78–100)
MONOCYTES NFR BLD AUTO: 28 %
NEUTROPHILS NFR BLD AUTO: 46 %
NRBC BLD AUTO-RTO: 3 /100
PLATELET # BLD AUTO: 176 10E9/L (ref 150–450)
PLATELET # BLD AUTO: NORMAL 10E9/L (ref 150–450)
PLATELET # BLD EST: ABNORMAL 10*3/UL
POTASSIUM SERPL-SCNC: 3.2 MMOL/L (ref 3.4–5.3)
POTASSIUM SERPL-SCNC: 3.3 MMOL/L (ref 3.4–5.3)
POTASSIUM SERPL-SCNC: NORMAL MMOL/L (ref 3.4–5.3)
PROT SERPL-MCNC: 5.7 G/DL (ref 6.8–8.8)
PROT SERPL-MCNC: NORMAL G/DL (ref 6.8–8.8)
RBC # BLD AUTO: 3.29 10E12/L (ref 3.8–5.2)
RBC # BLD AUTO: NORMAL 10E12/L (ref 3.8–5.2)
RBC MORPH BLD: ABNORMAL
SODIUM SERPL-SCNC: 141 MMOL/L (ref 133–144)
SODIUM SERPL-SCNC: NORMAL MMOL/L (ref 133–144)
WBC # BLD AUTO: 1.6 10E9/L (ref 4–11)
WBC # BLD AUTO: NORMAL 10E9/L (ref 4–11)

## 2020-12-16 PROCEDURE — 250N000013 HC RX MED GY IP 250 OP 250 PS 637: Performed by: NURSE PRACTITIONER

## 2020-12-16 PROCEDURE — 96361 HYDRATE IV INFUSION ADD-ON: CPT

## 2020-12-16 PROCEDURE — 85025 COMPLETE CBC W/AUTO DIFF WBC: CPT | Performed by: FAMILY MEDICINE

## 2020-12-16 PROCEDURE — 258N000003 HC RX IP 258 OP 636: Performed by: NURSE PRACTITIONER

## 2020-12-16 PROCEDURE — 80053 COMPREHEN METABOLIC PANEL: CPT | Performed by: FAMILY MEDICINE

## 2020-12-16 PROCEDURE — 250N000013 HC RX MED GY IP 250 OP 250 PS 637: Performed by: FAMILY MEDICINE

## 2020-12-16 PROCEDURE — 250N000011 HC RX IP 250 OP 636: Performed by: NURSE PRACTITIONER

## 2020-12-16 PROCEDURE — 999N000156 HC STATISTIC RCP CONSULT EA 30 MIN

## 2020-12-16 PROCEDURE — 99217 PR OBSERVATION CARE DISCHARGE: CPT | Performed by: NURSE PRACTITIONER

## 2020-12-16 PROCEDURE — 84132 ASSAY OF SERUM POTASSIUM: CPT | Performed by: FAMILY MEDICINE

## 2020-12-16 PROCEDURE — G0378 HOSPITAL OBSERVATION PER HR: HCPCS

## 2020-12-16 RX ORDER — LORAZEPAM 1 MG/1
1 TABLET ORAL EVERY 4 HOURS PRN
Status: DISCONTINUED | OUTPATIENT
Start: 2020-12-16 | End: 2020-12-16 | Stop reason: HOSPADM

## 2020-12-16 RX ORDER — MORPHINE SULFATE 15 MG/1
15-30 TABLET ORAL
Status: DISCONTINUED | OUTPATIENT
Start: 2020-12-16 | End: 2020-12-16 | Stop reason: HOSPADM

## 2020-12-16 RX ORDER — HEPARIN SODIUM (PORCINE) LOCK FLUSH IV SOLN 100 UNIT/ML 100 UNIT/ML
5 SOLUTION INTRAVENOUS
Status: DISCONTINUED | OUTPATIENT
Start: 2020-12-16 | End: 2020-12-16 | Stop reason: HOSPADM

## 2020-12-16 RX ORDER — POTASSIUM CHLORIDE 750 MG/1
10 TABLET, EXTENDED RELEASE ORAL 3 TIMES DAILY
Qty: 90 TABLET | Refills: 0 | Status: SHIPPED | OUTPATIENT
Start: 2020-12-16 | End: 2021-04-14

## 2020-12-16 RX ORDER — POTASSIUM CHLORIDE 1500 MG/1
20 TABLET, EXTENDED RELEASE ORAL ONCE
Status: COMPLETED | OUTPATIENT
Start: 2020-12-16 | End: 2020-12-16

## 2020-12-16 RX ORDER — POTASSIUM CHLORIDE 750 MG/1
10 TABLET, EXTENDED RELEASE ORAL 3 TIMES DAILY
Status: DISCONTINUED | OUTPATIENT
Start: 2020-12-16 | End: 2020-12-16 | Stop reason: HOSPADM

## 2020-12-16 RX ORDER — MECLIZINE HYDROCHLORIDE 25 MG/1
25 TABLET ORAL 3 TIMES DAILY PRN
Qty: 30 TABLET | Refills: 0 | Status: SHIPPED | OUTPATIENT
Start: 2020-12-16 | End: 2021-02-05

## 2020-12-16 RX ORDER — LORAZEPAM 1 MG/1
1 TABLET ORAL EVERY 4 HOURS PRN
Qty: 30 TABLET | Refills: 0 | Status: SHIPPED | OUTPATIENT
Start: 2020-12-16 | End: 2023-01-01

## 2020-12-16 RX ADMIN — POTASSIUM CHLORIDE 20 MEQ: 1500 TABLET, EXTENDED RELEASE ORAL at 08:51

## 2020-12-16 RX ADMIN — VENLAFAXINE HYDROCHLORIDE 225 MG: 150 CAPSULE, EXTENDED RELEASE ORAL at 08:52

## 2020-12-16 RX ADMIN — BUSPIRONE HYDROCHLORIDE 15 MG: 5 TABLET ORAL at 08:52

## 2020-12-16 RX ADMIN — APIXABAN 5 MG: 5 TABLET, FILM COATED ORAL at 08:52

## 2020-12-16 RX ADMIN — POTASSIUM CHLORIDE 10 MEQ: 750 TABLET, EXTENDED RELEASE ORAL at 10:58

## 2020-12-16 RX ADMIN — Medication 5 ML: at 13:30

## 2020-12-16 RX ADMIN — POTASSIUM CHLORIDE 20 MEQ: 1500 TABLET, EXTENDED RELEASE ORAL at 01:08

## 2020-12-16 RX ADMIN — POTASSIUM CHLORIDE 10 MEQ: 750 TABLET, EXTENDED RELEASE ORAL at 13:24

## 2020-12-16 RX ADMIN — PROPRANOLOL HYDROCHLORIDE 80 MG: 80 CAPSULE, EXTENDED RELEASE ORAL at 09:06

## 2020-12-16 RX ADMIN — SODIUM CHLORIDE: 9 INJECTION, SOLUTION INTRAVENOUS at 05:55

## 2020-12-16 NOTE — DISCHARGE INSTRUCTIONS
Hospital Follow Up Appointment:  Dr. Jabier Sultana  Thursday December 24th at 10am  [facility]   [phone]

## 2020-12-16 NOTE — PLAN OF CARE
S-(situation): Patient registered to Observation. Patient arrived to room 252 via cart from ED.    B-(background): Nausea/vomiting since 12/13/20 following most recent chemo treatment 1 week ago    A-(assessment): A&O x4, VSS. Lung sounds clear, on room air. Bowel sounds active, voiding adequately. Intermittent nausea reported, utilized PRN meclizine, 1 occurrence of emesis. On high K+ replacement protocol, last dose given 2015 with next recheck due 0015. Ambulates to bathroom as SBA. Skin intact.        R-(recommendations): Orders and observation goals reviewed with patient. Goals include vitals at baseline and tolerating oral intake with adequate hydration.     Nursing Observation criteria listed below was met:    Skin issues/needs documented:NA  Isolation needs addressed and Signage up: Yes  Fall Prevention: Education given and documented: Yes  Education Assessment documented:Yes  Education Documented: Yes  OBS video/handout Reviewed & Documented: Yes  Allergies Reviewed: Yes  Medication Reconciliation Complete: Yes  New medication patient education completed and documented (Possible Side Effects of Common Medications handout): Yes  Home medications if not able to send immediately home with family stored here: NA  Reminder note placed in discharge instructions: NA  Patient has discharge needs (If yes, please explain): No

## 2020-12-16 NOTE — PROGRESS NOTES
Call received from rell Hewitt.  Shaneka admitted to [facility] yesterday.  He states he is not sure if she is communicated well with her providers about how she is doing.  He states, she slept for three days last weekend, her quality of life is very poor on this last chemotherapy.  Having increased pain and current regimen of Morphine not working effectively - building up tolerance.  He feels that a discussion is needed regarding either stopping treatment and comfort measures if there are no other options.  He is very concerned and welcomes phone call from MD if more information needed.  Writer advised will discuss with Dr. Zepeda and Annie.  She is scheduled for next Tuesday, 12/22 with MD and potential infusion.

## 2020-12-16 NOTE — PROGRESS NOTES
S-(situation): shift note    B-(background): n/v    A-(assessment): vss, BP improved from last evening. Pt had intermittent nausea last evening, had 500mL emesis around 2230, given IV ativan and relieved. Pt was able to sleep between cares. Denies pain. Tele is NSR. Pt given oral potassium replacement, most recent potassium lab= 3.2.     R-(recommendations): continue to monitor potassium, treat per protocol. Continue to monitor I and O, n/v, vs.

## 2020-12-16 NOTE — PLAN OF CARE
S-(situation): Patient discharged to ED entrance via W/C with nursing assistant    B-(background): Observation goals met     A-(assessment): Pt is A & O x 4, VSS, slightly elevated BP, on room air, afebrile, good urine output. Denies pain, denies any nausea/vomiting today. Tolerated food and liquids well. Up to the bathroom via stand-by assist. Potassium level of 3.3 this AM, replacement given.    R-(recommendations): Discharge instructions reviewed with patient. Listed belongings gathered and returned to patient.  Patient Education resolved: Yes  New medications-Pt. Has been educated about reason of use and side effects Yes  Home and hospital acquired medications returned to patient NA  Medication Bin checked and emptied on discharge Yes

## 2020-12-16 NOTE — DISCHARGE SUMMARY
Formerly [facility]  Hospitalist Discharge Summary      Date of Admission:  12/15/2020  Date of Discharge:  12/16/2020  Discharging Provider: Tina Howell CNP      Discharge Diagnoses   Principal Problem:    Dehydration  Active Problems:    Anxiety and depression    Hypokalemia    Pulmonary embolism without acute cor pulmonale, unspecified chronicity, unspecified pulmonary embolism type (H)    Chemotherapy-induced neutropenia (H)    Chemotherapy induced nausea and vomiting    Sinus tachycardia    Non-intractable vomiting with nausea, unspecified vomiting type    Breast cancer (H)      Follow-ups Needed After Discharge   Follow-up Appointments     Follow-up and recommended labs and tests       Follow up with Oncology as scheduled.             Unresulted Labs Ordered in the Past 30 Days of this Admission     Date and Time Order Name Status Description    12/15/2020 1122 Blood culture Preliminary     12/15/2020 0922 Blood culture Preliminary       These results will be followed up by PCP    Discharge Disposition   Discharged to home  Condition at discharge: Stable      Hospital Course     Shaneka Alvarez is a 60 year old female admitted on 12/15/2020. She presented to the ER with nausea, vomiting and not able to take her medications for a week. Patient states Zofran and Compazine are not working well. She recieved her dose of chemotherapy, C2D8 Trodelvy, on 12/8. Patient has been afebrile, hemodynamically stable. Patient HR has been elevated 130-150 with initial Lactic acid 5.0. Decreased to 1.4 after 2 Liters NS.  Potassium low, 2.8, chronic concern. WBC 2.2, ANC 1.3.   Blood cultures X 2 done.  No signs of bacterial infection noted. Patient was admitted to observation and hydrated with IVF overnight. Pro calcitonin was negative.  CT was negative for PE, no pneumonia. Patient was able to restart Buspar, Propranolol, Effexor. Nausea was treated with Ativan with benefit. Patient has been  able to tolerate oral intake.  Patient tachycardia was resolved, HR 80-90 after restarting home meds and hydration. Potassium was replaced and patient was discharged on oral supplements.  Patient was feeling much better and was discharged in stable condition.     Consultations This Hospital Stay   None    Code Status   Full Code    Time Spent on this Encounter   I, Tina Howell CNP, personally saw the patient today and spent less than or equal to 30 minutes discharging this patient.       Tina Howell CNP  [address] SURGICAL  [address] [facility]   [place] [address]  Phone: [phone]  ______________________________________________________________________    Physical Exam   Vital Signs: Temp: 99  F (37.2  C) Temp src: Oral BP: 128/74 Pulse: 88   Resp: 16 SpO2: 97 % O2 Device: None (Room air)    Weight: 127 lbs 3.2 oz  Constitutional: awake, alert, cooperative, appears ill, anxious.   Eyes: Lids and lashes normal, pupils equal, round and reactive to light, extra ocular muscles intact, sclera clear, conjunctiva normal  NOSE: Normal without discharge.  ENT: Normocephalic, without obvious abnormality, atraumatic, oral pharynx with dry mucous membranes   Respiratory:     Cardiovascular: Normal apical impulse,regular rate and rhythm , Tachycardia noted.   GI: Normal bowel sounds, soft, non-distended, non-tender   Skin: normal skin color, texture, turgor  Musculoskeletal: There is no redness, warmth, or swelling of the joints.    Neurologic: Awake, alert, oriented to name, place and time.  Cranial nerves II-XII are grossly intact. No focal findings.            Primary Care Physician   Mohit Barcenas    Discharge Orders      Reason for your hospital stay    You were in the hospital for nausea and vomiting, likely related to chemotherapy.     Follow-up and recommended labs and tests     Follow up with Oncology as scheduled.     Activity    Your activity upon discharge: activity  as tolerated     Diet    Follow this diet upon discharge: Orders Placed This Encounter      Room Service      Regular Diet Adult       Significant Results and Procedures   Most Recent 3 CBC's:  Recent Labs   Lab Test 12/16/20  0630 12/16/20  0545 12/15/20  0920   WBC 1.6* Canceled, Test credited 2.2*   HGB 9.3* Canceled, Test credited 13.8   MCV 85 Canceled, Test credited 82    Canceled, Test credited 301     Most Recent Urinalysis:  Recent Labs   Lab Test 12/15/20  1725   COLOR Yellow   APPEARANCE Clear   URINEGLC Negative   URINEBILI Negative   URINEKETONE 20*   SG 1.041*   UBLD Negative   URINEPH 7.0   PROTEIN Negative   NITRITE Negative   LEUKEST Negative   RBCU 3*   WBCU 2*   ,   Results for orders placed or performed during the hospital encounter of 12/15/20   CT Chest Pulmonary Embolism w Contrast    Narrative    CT CHEST PULMONARY EMBOLISM WITH CONTRAST 12/15/2020 3:33 PM    CLINICAL HISTORY: Dyspnea. Nausea and vomiting. History of breast  cancer with bilateral mastectomies and reconstruction.    TECHNIQUE: CT angiogram chest during arterial phase injection IV  contrast. 2D and 3D MIP reconstructions were performed by the CT  technologist. Dose reduction techniques were used.     CONTRAST: 70 mL Isovue 370     COMPARISON: PET/CT 11/6/2020.    FINDINGS:  ANGIOGRAM CHEST: Pulmonary arteries are normal caliber and negative  for pulmonary emboli. Thoracic aorta is negative for dissection. No CT  evidence of right heart strain.    LUNGS AND PLEURA: Lungs are clear. No infiltrate or consolidation. No  pleural fluid.    MEDIASTINUM/AXILLAE: Heart is normal in size. No pericardial fluid.  Esophagus is unremarkable. Thyroid gland is unremarkable. Left chest  port appears in good position. No enlarged lymph nodes. Calcified  subcarinal nodes consistent with old granulomatous disease. Bilateral  mastectomy changes with implant reconstruction. Implants appear  intact.    UPPER ABDOMEN: Cholecystectomy  changes.    MUSCULOSKELETAL: Sclerotic bone lesions are present consistent with  known bone metastases. Old healed probable pathologic fractures  involving the ribs and spine are noted. L1 vertebral body compression  deformity is unchanged.      Impression    IMPRESSION:  1.  No evidence of pulmonary embolism. Thoracic aorta is unremarkable.    2.  Lungs are clear. No enlarged lymph nodes. Evidence of old  granulomatous disease.    3.  Metastatic bone changes stable since recent PET/CT.    NASH SCHMIDT MD       Discharge Medications   Current Discharge Medication List      START taking these medications    Details   meclizine (ANTIVERT) 25 MG tablet Take 1 tablet (25 mg) by mouth 3 times daily as needed for nausea  Qty: 30 tablet, Refills: 0    Associated Diagnoses: Chemotherapy induced nausea and vomiting      potassium chloride ER (KLOR-CON M) 10 MEQ CR tablet Take 1 tablet (10 mEq) by mouth 3 times daily  Qty: 90 tablet, Refills: 0    Associated Diagnoses: Hypokalemia         CONTINUE these medications which have CHANGED    Details   LORazepam (ATIVAN) 1 MG tablet Take 1 tablet (1 mg) by mouth every 4 hours as needed for anxiety or nausea  Qty: 30 tablet, Refills: 0    Associated Diagnoses: Chemotherapy induced nausea and vomiting         CONTINUE these medications which have NOT CHANGED    Details   apixaban ANTICOAGULANT (ELIQUIS) 5 MG tablet Take 1 tablet (5 mg) by mouth 2 times daily  Qty: 60 tablet, Refills: 3    Associated Diagnoses: Acute pulmonary embolism without acute cor pulmonale, unspecified pulmonary embolism type (H)      busPIRone (BUSPAR) 15 MG tablet Take 1 tablet (15 mg) by mouth 2 times daily  Qty: 180 tablet, Refills: 1    Associated Diagnoses: Anxiety      calcium citrate-vitamin D (CITRACAL) 315-250 MG-UNIT TABS Take by mouth daily      Cholecalciferol (VITAMIN D3 PO) Take 2,000 Units by mouth daily      cyclopentolate (CYCLOGYL) 1 % ophthalmic solution INSTILL 1 DROP INTO OU TID UTD       diclofenac (VOLTAREN) 1 % topical gel Place 4 g onto the skin 4 times daily . Apply to areas of pain.  Qty: 200 g, Refills: 0    Associated Diagnoses: Carcinoma of breast metastatic to bone, unspecified laterality (H)      ibuprofen (ADVIL/MOTRIN) 600 MG tablet Take 1 tablet (600 mg) by mouth 4 times daily as needed for moderate pain  Qty: 90 tablet, Refills: 3    Associated Diagnoses: Cancer associated pain      loperamide (IMODIUM) 2 MG capsule Start with 2 caps (4 mg), then take one cap (2 mg) after each diarrheal stool as needed. Do not use more than 8 caps (16 mg) per day.  Qty: 30 capsule, Refills: 0    Associated Diagnoses: Chemotherapy-induced neutropenia (H); Hypokalemia; Metastatic breast cancer (H); Bilateral malignant neoplasm of breast in female, unspecified estrogen receptor status, unspecified site of breast (H)      magnesium 250 MG tablet Take 1 tablet by mouth daily      morphine (MS CONTIN) 15 MG CR tablet Take 1 tablet (15 mg) by mouth every evening  Qty: 30 tablet, Refills: 0    Associated Diagnoses: Cancer associated pain      morphine (MSIR) 15 MG IR tablet Take 1-2 tablets (15-30 mg) by mouth every 3 hours as needed for severe pain  Qty: 180 tablet, Refills: 0    Associated Diagnoses: Cancer associated pain      naloxone (NARCAN) 1 mg/mL for intranasal kit (2 syringes with 2 mucosal atomizer device) In opioid overdose put cone in nostril and push 1/2 of contents into each nostril.  Repeat every 3 min if no response until help arrives.  Qty: 1 Syringe, Refills: 1    Associated Diagnoses: Cancer associated pain      polyethylene glycol (MIRALAX/GLYCOLAX) packet Take 17 g by mouth 2 times daily as needed for constipation . Goal is to have a bowel movement every 1-2 days.    Associated Diagnoses: Carcinoma of breast metastatic to bone, unspecified laterality (H)      potassium & sodium phosphates (NEUTRA-PHOS) 280-160-250 MG Packet Take 1 packet by mouth 4 times daily for 28 days  Qty: 112  "packet, Refills: 0    Associated Diagnoses: Hypophosphatemia      prochlorperazine (COMPAZINE) 10 MG tablet Take 1 tablet (10 mg) by mouth every 6 hours as needed (Nausea/Vomiting)  Qty: 30 tablet, Refills: 2    Associated Diagnoses: Chemotherapy-induced neutropenia (H); Hypokalemia; Metastatic breast cancer (H); Bilateral malignant neoplasm of breast in female, unspecified estrogen receptor status, unspecified site of breast (H)      propranolol ER (INDERAL LA) 80 MG 24 hr capsule Take 80 mg by mouth daily      senna-docusate (SENOKOT-S/PERICOLACE) 8.6-50 MG tablet Take 1-2 tablets by mouth 2 times daily as needed for constipation . Goal is to have a bowel movement every 1-2 days.    Associated Diagnoses: Carcinoma of breast metastatic to bone, unspecified laterality (H)      traZODone (DESYREL) 50 MG tablet Take 1 tablet (50 mg) by mouth At Bedtime  Qty: 30 tablet, Refills: 1    Associated Diagnoses: Insomnia, unspecified type      venlafaxine (EFFEXOR-XR) 75 MG 24 hr capsule Take 225 mg by mouth daily      acetaminophen (TYLENOL) 500 MG tablet Take 2 tablets (1,000 mg) by mouth 3 times daily  Qty: 180 tablet, Refills: 0    Associated Diagnoses: Carcinoma of breast metastatic to bone, unspecified laterality (H)      ondansetron (ZOFRAN-ODT) 8 MG ODT tab Take 1 tablet (8 mg) by mouth every 8 hours as needed for nausea  Qty: 70 tablet, Refills: 3    Associated Diagnoses: Drug-induced nausea and vomiting      syringe/needle, disp, (B-D [employer] SYRINGE) 23G X 1\" 3 ML MISC 1 each as needed  Qty: 25 each, Refills: 0    Comments: Verbal order per Charito Torres for 25 syringes and needles called into [facility] tonight 5/11/2016 at 530p. Patient notified of refill tonight./   Samantha Saldana RN  Patient Care Supervisor  [facility]  Office:[phone]  Associated Diagnoses: Migraine without aura and without status migrainosus, not intractable           Allergies   Allergies   Allergen " Reactions     Bactrim [Sulfamethoxazole W/Trimethoprim]      Internal bleeding     Copaxone [Glatiramer] Hives

## 2020-12-16 NOTE — PROGRESS NOTES
Covid test negative. H&P states low suspicion for false negative.     MD notified. Isolation precautions discontinued.

## 2020-12-17 ENCOUNTER — PATIENT OUTREACH (OUTPATIENT)
Dept: ONCOLOGY | Facility: CLINIC | Age: 60
End: 2020-12-17

## 2020-12-17 NOTE — PROGRESS NOTES
Attempted to call patient regarding recent ER visit stay for patient due to nausea, vomiting.  Unable to leave voicemail message as mailbox is full.    Connected with spouse, Kash, and Shaneka was next to him.  Shaneka feels better, no nausea today; she was supported with IV fluids, Lorazepam at [facility].  She states this therapy has been rough and it may be time for a new one.  She is fine to wait for a video visit with Dr. Zepeda on Tues., 12/22; Kash would like to be in on the phone call and I advised she can call his cell phone and he can listen on the video visit.  Advised to call if she needs to report any new symptoms.

## 2020-12-20 ENCOUNTER — HEALTH MAINTENANCE LETTER (OUTPATIENT)
Age: 60
End: 2020-12-20

## 2020-12-21 LAB
BACTERIA SPEC CULT: NO GROWTH
BACTERIA SPEC CULT: NO GROWTH
Lab: NORMAL
SPECIMEN SOURCE: NORMAL
SPECIMEN SOURCE: NORMAL

## 2020-12-22 ENCOUNTER — OFFICE VISIT (OUTPATIENT)
Dept: ONCOLOGY | Facility: CLINIC | Age: 60
End: 2020-12-22
Payer: COMMERCIAL

## 2020-12-22 ENCOUNTER — VIRTUAL VISIT (OUTPATIENT)
Dept: ONCOLOGY | Facility: CLINIC | Age: 60
End: 2020-12-22
Payer: COMMERCIAL

## 2020-12-22 ENCOUNTER — INFUSION THERAPY VISIT (OUTPATIENT)
Dept: INFUSION THERAPY | Facility: CLINIC | Age: 60
End: 2020-12-22
Payer: COMMERCIAL

## 2020-12-22 VITALS
DIASTOLIC BLOOD PRESSURE: 78 MMHG | SYSTOLIC BLOOD PRESSURE: 114 MMHG | WEIGHT: 129.3 LBS | BODY MASS INDEX: 23.65 KG/M2 | TEMPERATURE: 98.6 F | HEART RATE: 79 BPM | OXYGEN SATURATION: 100 %

## 2020-12-22 DIAGNOSIS — C50.912 BILATERAL MALIGNANT NEOPLASM OF BREAST IN FEMALE, UNSPECIFIED ESTROGEN RECEPTOR STATUS, UNSPECIFIED SITE OF BREAST (H): ICD-10-CM

## 2020-12-22 DIAGNOSIS — E87.6 HYPOKALEMIA: Primary | ICD-10-CM

## 2020-12-22 DIAGNOSIS — C79.51 BONE METASTASES: Primary | ICD-10-CM

## 2020-12-22 DIAGNOSIS — C50.911 BILATERAL MALIGNANT NEOPLASM OF BREAST IN FEMALE, UNSPECIFIED ESTROGEN RECEPTOR STATUS, UNSPECIFIED SITE OF BREAST (H): ICD-10-CM

## 2020-12-22 DIAGNOSIS — D70.1 CHEMOTHERAPY-INDUCED NEUTROPENIA (H): ICD-10-CM

## 2020-12-22 DIAGNOSIS — C50.919 METASTATIC BREAST CANCER: ICD-10-CM

## 2020-12-22 DIAGNOSIS — G89.3 CANCER ASSOCIATED PAIN: Primary | ICD-10-CM

## 2020-12-22 DIAGNOSIS — T45.1X5A CHEMOTHERAPY-INDUCED NEUTROPENIA (H): ICD-10-CM

## 2020-12-22 DIAGNOSIS — C79.51 BONE METASTASES: ICD-10-CM

## 2020-12-22 LAB
ALBUMIN SERPL-MCNC: 3.2 G/DL (ref 3.4–5)
ALP SERPL-CCNC: 84 U/L (ref 40–150)
ALT SERPL W P-5'-P-CCNC: 35 U/L (ref 0–50)
ANION GAP SERPL CALCULATED.3IONS-SCNC: 4 MMOL/L (ref 3–14)
AST SERPL W P-5'-P-CCNC: 20 U/L (ref 0–45)
BILIRUB SERPL-MCNC: 0.3 MG/DL (ref 0.2–1.3)
BUN SERPL-MCNC: 6 MG/DL (ref 7–30)
CALCIUM SERPL-MCNC: 8.5 MG/DL (ref 8.5–10.1)
CANCER AG27-29 SERPL-ACNC: 330 U/ML (ref 0–39)
CHLORIDE SERPL-SCNC: 104 MMOL/L (ref 94–109)
CO2 SERPL-SCNC: 28 MMOL/L (ref 20–32)
CREAT SERPL-MCNC: 0.56 MG/DL (ref 0.52–1.04)
DIFFERENTIAL METHOD BLD: ABNORMAL
EOSINOPHIL # BLD AUTO: 0.1 10E9/L (ref 0–0.7)
EOSINOPHIL NFR BLD AUTO: 5 %
ERYTHROCYTE [DISTWIDTH] IN BLOOD BY AUTOMATED COUNT: 15.2 % (ref 10–15)
GFR SERPL CREATININE-BSD FRML MDRD: >90 ML/MIN/{1.73_M2}
GLUCOSE SERPL-MCNC: 107 MG/DL (ref 70–99)
HCT VFR BLD AUTO: 35.1 % (ref 35–47)
HGB BLD-MCNC: 11.1 G/DL (ref 11.7–15.7)
LYMPHOCYTES # BLD AUTO: 0.6 10E9/L (ref 0.8–5.3)
LYMPHOCYTES NFR BLD AUTO: 24 %
MAGNESIUM SERPL-MCNC: 2.2 MG/DL (ref 1.6–2.3)
MCH RBC QN AUTO: 27.5 PG (ref 26.5–33)
MCHC RBC AUTO-ENTMCNC: 31.6 G/DL (ref 31.5–36.5)
MCV RBC AUTO: 87 FL (ref 78–100)
MONOCYTES # BLD AUTO: 0.7 10E9/L (ref 0–1.3)
MONOCYTES NFR BLD AUTO: 29 %
NEUTROPHILS # BLD AUTO: 1 10E9/L (ref 1.6–8.3)
NEUTROPHILS NFR BLD AUTO: 42 %
PHOSPHATE SERPL-MCNC: 2.2 MG/DL (ref 2.5–4.5)
PLATELET # BLD AUTO: 210 10E9/L (ref 150–450)
PLATELET # BLD EST: ABNORMAL 10*3/UL
POTASSIUM SERPL-SCNC: 4 MMOL/L (ref 3.4–5.3)
PROT SERPL-MCNC: 6.5 G/DL (ref 6.8–8.8)
RBC # BLD AUTO: 4.04 10E12/L (ref 3.8–5.2)
RBC MORPH BLD: NORMAL
SODIUM SERPL-SCNC: 136 MMOL/L (ref 133–144)
WBC # BLD AUTO: 2.4 10E9/L (ref 4–11)

## 2020-12-22 PROCEDURE — 99207 PR NO CHARGE LOS: CPT

## 2020-12-22 PROCEDURE — 80053 COMPREHEN METABOLIC PANEL: CPT | Performed by: INTERNAL MEDICINE

## 2020-12-22 PROCEDURE — 84100 ASSAY OF PHOSPHORUS: CPT | Performed by: INTERNAL MEDICINE

## 2020-12-22 PROCEDURE — 83735 ASSAY OF MAGNESIUM: CPT | Performed by: INTERNAL MEDICINE

## 2020-12-22 PROCEDURE — 99214 OFFICE O/P EST MOD 30 MIN: CPT | Mod: GT | Performed by: INTERNAL MEDICINE

## 2020-12-22 PROCEDURE — 85025 COMPLETE CBC W/AUTO DIFF WBC: CPT | Performed by: INTERNAL MEDICINE

## 2020-12-22 PROCEDURE — 86300 IMMUNOASSAY TUMOR CA 15-3: CPT | Performed by: INTERNAL MEDICINE

## 2020-12-22 PROCEDURE — 96372 THER/PROPH/DIAG INJ SC/IM: CPT | Performed by: NURSE PRACTITIONER

## 2020-12-22 RX ORDER — MORPHINE SULFATE 15 MG/1
15 TABLET, FILM COATED, EXTENDED RELEASE ORAL EVERY EVENING
Qty: 30 TABLET | Refills: 0 | Status: SHIPPED | OUTPATIENT
Start: 2020-12-22 | End: 2021-02-15

## 2020-12-22 RX ORDER — HEPARIN SODIUM (PORCINE) LOCK FLUSH IV SOLN 100 UNIT/ML 100 UNIT/ML
5 SOLUTION INTRAVENOUS
Status: DISCONTINUED | OUTPATIENT
Start: 2020-12-22 | End: 2020-12-22 | Stop reason: HOSPADM

## 2020-12-22 RX ADMIN — HEPARIN SODIUM (PORCINE) LOCK FLUSH IV SOLN 100 UNIT/ML 5 ML: 100 SOLUTION at 14:24

## 2020-12-22 ASSESSMENT — PAIN SCALES - GENERAL: PAINLEVEL: SEVERE PAIN (7)

## 2020-12-22 NOTE — PATIENT INSTRUCTIONS
No chemo today    Schedule 1 week later ( no need to see a provider then ) and 1 week after that    Then see me 1/19/2021 with chemo

## 2020-12-22 NOTE — PROGRESS NOTES
"Patient's name and  were verified.  See Doc Flowsheet - IV assess for details.  IVAD accessed with 20G  1\" mcnair gripper plus needle  blood return positive: YES  Site without redness, tenderness or swelling: YES  flushed with 30cc NS and 5cc 100u/ml heparin  Needle: Left accessed for infusion  Comments: Labs drawn.  Patient tolerated procedure without incident.    Catarina Mendoza  RN, BSN, OCN        "

## 2020-12-22 NOTE — PROGRESS NOTES
ONCOLOGY FOLLOW UP NOTE: 12/22/2020        I took the history from reviewing the previous notes that she was following with Dr. Amaya.  I have copied and updated from prior notes.    ONCOLOGY History:    1.  January 2006:  Diagnosed with Stage IIB, T2 N1 M0 invasive lobular carcinoma of the right breast.  Final pathology showed a 4.5 x 3.8 x 2.5 cm, 01/14 lymph nodes positive.  Estrogen, progesterone receptor positive, HER-2 negative.     2.  Genetics.  BRCA1 and 2 mutations not detected. 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       THERAPY TO DATE:   1.  2006:  ECOG 2104 protocol of dose-dense Adriamycin, Cytoxan and Avastin x4 cycles followed by Taxol and Avastin x4 cycles.   2.  03/2007:  Completed 1 year Avastin.   3.  11/2006-01/2007:  Radiotherapy to the right breast of 5040 cGy.   4.  03/[id number]:  Aromasin.  Stopped after moving to the [facility].   5.  01/2016:  Right modified radical mastectomy with latissimus dorsi flap reconstruction and a left prophylactic mastectomy with latissimus dorsi flap reconstruction.     She recently had MRI of the brain as a follow-up for multiple sclerosis and there were multiple calvarial lesions noted which was suspicious for metastatic disease.  There was no evidence of new multiple sclerosis lesions.  She had a PET scan on 8/30/2019 which showed widespread bone metastatic lesions (calvarium, spine, sacrum, pelvis, ribs most prominent at C7, T3, L1 and L4), hypermetabolic 3 cm lesion in LLL, and mediastinal/hilar LN. CEA wnl at 1.9 and C27-29 elevated to 415 (28 on 8/23/16). A CT guided biopsy of the right iliac bone was obtained on 9/4/19, pathology consistent with metastatic adenocarcinoma (breast), ER/MI negative, HER-2 negative PDL 1 < 1%. A second biopsy was take of the LLL nodule via EBUS on 9/5/19 did not show any malignancy.    C/T/L spine MRI 8/3/19 to 9/2/19 with numerous enhancing lesions of the C, T and L spine, largest around T9 and L1. No  evidence of cord compression. Has a L1 compression fracture and impending L4.     Received radiation T12-L5 3000 cGy in 10 fractions from 9/6/2019 till 9/18/2019.  Neurosurgery recommended no surgical intervention but to wear Vienna brace when out of bed and HOB >30 degrees    She started palliative Xeloda 2000/1500 on 10/1/2019 but after just a few doses she noticed nausea, red eyes blurred vision, loss of appetite.  She stopped taking it after 5 doses and was seen by nurse practitioner on 10/7/2019 when she was improving.  At that point she was restarted on Xeloda at a lower dose of 1000 mg twice a day.  As she was not able to tolerate even the lower dose with extreme nausea and vomiting and feeling extremely fatigued and blurry vision, we decided on stopping Xeloda.    We decided on repeating scans and MRI brain on 10/25/2019 showed no intracranial metastatic disease.   Multiple enhancing calvarial lesions may be increased in number and are suggestive of metastatic disease. Thinner imaging on the current study may identify the smaller lesions and may be responsible for  identified more lesions.   There is a single focus of T2 hyperintense signal within the anterior right temporal lobe may represent interval demyelination. There is no evidence for active demyelination.    PET/CT on 11/1/2019 showed favorable response to therapy and Overall FDG uptake within scattered osseous metastases is decreased since 8/30/2019, particularly within the pelvis. Increased sclerotic appearance of L1 and L4 pathologic compression deformities, likely sequela of recently completed palliative radiation therapy.    No significant FDG uptake in previously demonstrated hypermetabolic mediastinal lymph nodes. Biopsy negative on 9/5/2019.  There is also decreased FDG uptake in the left lower lobe (biopsy negative for  malignancy), now with dense consolidation containing air bronchograms suggestive of infection versus aspiration.  There is  diffuse FDG uptake within the esophagus, consistent with esophagitis.    Because of intolerance to Xeloda we decided on switching to weekly paclitaxel on 11/5/2019.    C#2 Taxol 12/4/19- started with 70mg/m2 dose reduction    C#3 Taxol 12/30/2019     PET/CT on 1/24/2020 showed progression of the disease in some of the bone lesions including increase in size and lytic lesion within the vertebral body of C4 measuring 1 cm as opposed to 0.6 cm previously and a new central soft tissue nodule with SUV max 4.1 when previously it was non-hypermetabolic.  There is also some progression noted in the right proximal femur and right iliac bone.  There is decreased metabolic uptake in the right lamina of T9 with SUV max 4.7 when previously it was 8.0.  Other lesions are stable.    CA 27-29 also had increased significantly and it was 257 on 1/28/2020.    We decided on switching to eribulin on 1/28/2020.    C#2 2/18/2020  C#2 D#8 2/25/2020    C#3 D#1 3/18/2020 -delayed by 1 week as per patient's preference because she wanted to visit her family.    She had a repeat PET/CT on 3/27/2020 which showed stable size of the bone metastatic lesions although the FDG avidity was less, consistent with treatment response.    She had MRI of the thoracic spine on 4/3/2020 and it was compared to the one which was done in August 2019 and it showed increased size of several metastatic lesions most notably at T9 but also at T5-T7.  Other lesions at T10, T11 and T12 appeared improved.    CA 27-29 was also down to 222 on 3/18/2020.    Cycle #4 eribulin ( dose reduced to 1.2 mg/m2 )-4/7/2020-   Cycle #5 Eribulin ( 1.2 mg/m2 )- 4/28/2020   Cycle #6 Eribulin ( 1.2 mg/m2 )- 5/19/2020     Cycle #7 Eribulin ( 1.2 mg/m2 )- 6/9/2020    Cycle #8 Eribulin ( 1.2 mg/m2 )- 6/30/2020     She had a repeat PET scan on 7/17/2020 which showed incidental finding of new acute bilateral pulmonary embolism in the distal left main pulmonary artery extending in the left  upper and lower lobes and in the segmental and branch arteries in the right lower lobe.    It also showed mildly increased FDG avidity in the lytic lesion in the T9 lamina and right pedicle with SUV max 5.3, previously 3.9.  That is also slightly increased FDG uptake to the left anterior fifth rib at the costochondral junction SUV max 3.9, previously 2.5.  There is slightly decreased FDG uptake in the right femoral neck lesion SUV max 2.2, previously 2.9.  FDG uptake in other bone lesions is fairly stable.  No new metastasis are seen.    CA 27-29 was 308 on 6/30/2020.  It was 286 on 5/19/2020.    Overall this is consistent with only slight progression versus stable disease.    She was started on Lovenox.    Cycle #9 eribulin 7/21/2020. CA 27-29 was 238    C#10 eribulin 8/18/2020. ( delayed by one week for ANC 0.9 )    C#11 Eribulin 9/8/2020    C#12 Eribulin 9/29/2020     C#13 Eribulin 10/20/2020     PET scan on 11/6/2020 shows progression of the disease with increased FDG uptake in the lytic lesions in the T9/T10 and right posterolateral elements as well as increased FDG uptake in the previously known hypermetabolic right iliac bone lesion suggesting recurrence of previously treated metastasis.  There is new subtle lesion in the right manubrium.  There is also a new fracture in the anterolateral left fourth rib with associated uptake and this could be a pathologic fracture.  Healing fracture in the left anterior fifth rib lesion.  There is resolution of the left pulmonary emboli.  Decreased FDG uptake of the esophagus consistent with improving inflammation.    CA 27-29 on 1020/2020 was also elevated at 489.    C#1 Trodelvy on 11/11/2020.  Cycle #2 Trodelvy 12/2/2020  Cycle number 2-day #8 Trodelvy 12/8/2020.    12/15/2020-12/16/2020.  She was admitted to the hospital with nausea vomiting and dehydration.  She had tachycardia and initial lactic acid of 5.  It decreased to 1.4 after 2 L of normal saline.  She also had  hypokalemia and ANC was 1.3.  She was treated with IV fluids.  Procalcitonin was negative.  CTA of the chest was negative for pulmonary embolism or pneumonia.  No bacterial infection was documented.  Her medication which she was initially unable to tolerate at home, but restarted and she was discharged home once started to feel better.      Interval history.  This is a video visit.  Her  is also available during the visit.  She has been feeling more tired with this chemotherapy.  Her nausea and vomiting was also worse.  She had some diarrhea for a couple of days.  She also tells me that over the last few weeks she ran out off MS Contin so has been taking 2-4 immediate release morphine every day to control the pain.  She been having some more pain in the back.  No new pain.  No new swellings.  The swelling at the back of the scalp is about the same.  No fevers or infections or shortness of breath.  No worsening neuropathy.  She has lost about 3 pounds.  Last few days have been better.      ECOG 1    ROS:  A comprehensive ROS was otherwise neg          I reviewed other history in epic as below.       PAST MEDICAL HISTORY:     1.  Breast cancer  2.  Multiple sclerosis.   3.  Depression.   4.  Migraines.   5.  Hypertension.   6.  Cholecystectomy and umbilical hernia repair.     SOCIAL HISTORY: She smoked for 5 years but quit many years ago.  Drinks alcohol socially.  She lives with her .  She is a teacher in middle school.       FAMILY HISTORY: She mentions that her mother had breast cancer at 49.  Both grandmothers had breast cancer but she is not sure of the age.  A couple of cousins have cancers.  Patient has 3 children who are healthy.    Current Outpatient Medications   Medication     acetaminophen (TYLENOL) 500 MG tablet     apixaban ANTICOAGULANT (ELIQUIS) 5 MG tablet     busPIRone (BUSPAR) 15 MG tablet     calcium citrate-vitamin D (CITRACAL) 315-250 MG-UNIT TABS     Cholecalciferol (VITAMIN D3 PO)  "    cyclopentolate (CYCLOGYL) 1 % ophthalmic solution     diclofenac (VOLTAREN) 1 % topical gel     ibuprofen (ADVIL/MOTRIN) 600 MG tablet     loperamide (IMODIUM) 2 MG capsule     LORazepam (ATIVAN) 1 MG tablet     magnesium 250 MG tablet     meclizine (ANTIVERT) 25 MG tablet     morphine (MS CONTIN) 15 MG CR tablet     morphine (MSIR) 15 MG IR tablet     naloxone (NARCAN) 1 mg/mL for intranasal kit (2 syringes with 2 mucosal atomizer device)     ondansetron (ZOFRAN-ODT) 8 MG ODT tab     polyethylene glycol (MIRALAX/GLYCOLAX) packet     potassium & sodium phosphates (NEUTRA-PHOS) 280-160-250 MG Packet     potassium chloride ER (KLOR-CON M) 10 MEQ CR tablet     prochlorperazine (COMPAZINE) 10 MG tablet     propranolol ER (INDERAL LA) 80 MG 24 hr capsule     senna-docusate (SENOKOT-S/PERICOLACE) 8.6-50 MG tablet     syringe/needle, disp, (B-D INTEGRA SYRINGE) 23G X 1\" 3 ML MISC     traZODone (DESYREL) 50 MG tablet     venlafaxine (EFFEXOR-XR) 75 MG 24 hr capsule     No current facility-administered medications for this visit.         Allergies   Allergen Reactions     Bactrim [Sulfamethoxazole W/Trimethoprim]      Internal bleeding     Copaxone [Glatiramer] Hives          PHYSICAL EXAMINATION:     There were no vitals taken for this visit.  Wt Readings from Last 4 Encounters:   12/16/20 57.7 kg (127 lb 3.2 oz)   12/02/20 62.2 kg (137 lb 1.6 oz)   11/30/20 56.7 kg (125 lb)   11/18/20 60.6 kg (133 lb 8 oz)         Constitutional.  Looks well and in no apparent distress.   Eyes.  Without eye redness or apparent jaundice.   Respiratory.  Non labored breathing. Speaking in full sentences.    Skin.  No concerning skin rashes on the skin visualized.   Neurological.  Is alert and oriented.  Psychiatric.  Mood and affect seem appropriate.      The rest of a comprehensive physical examination is deferred due to Public Health Emergency video visit restrictions.        Labs and Imaging.    Reviewed.  Results for KYLE GU " N (MRN [id number]) as of 12/22/2020 14:24   Ref. Range 12/22/2020 14:25   Sodium Latest Ref Range: 133 - 144 mmol/L 136   Potassium Latest Ref Range: 3.4 - 5.3 mmol/L 4.0   Chloride Latest Ref Range: 94 - 109 mmol/L 104   Carbon Dioxide Latest Ref Range: 20 - 32 mmol/L 28   Urea Nitrogen Latest Ref Range: 7 - 30 mg/dL 6 (L)   Creatinine Latest Ref Range: 0.52 - 1.04 mg/dL 0.56   GFR Estimate Latest Ref Range: >60 mL/min/1.73_m2 >90   GFR Estimate If Black Latest Ref Range: >60 mL/min/1.73_m2 >90   Calcium Latest Ref Range: 8.5 - 10.1 mg/dL 8.5   Anion Gap Latest Ref Range: 3 - 14 mmol/L 4   Magnesium Latest Ref Range: 1.6 - 2.3 mg/dL 2.2   Phosphorus Latest Ref Range: 2.5 - 4.5 mg/dL 2.2 (L)   Albumin Latest Ref Range: 3.4 - 5.0 g/dL 3.2 (L)   Protein Total Latest Ref Range: 6.8 - 8.8 g/dL 6.5 (L)   Bilirubin Total Latest Ref Range: 0.2 - 1.3 mg/dL 0.3   Alkaline Phosphatase Latest Ref Range: 40 - 150 U/L 84   ALT Latest Ref Range: 0 - 50 U/L 35   AST Latest Ref Range: 0 - 45 U/L 20   Glucose Latest Ref Range: 70 - 99 mg/dL 107 (H)   WBC Latest Ref Range: 4.0 - 11.0 10e9/L 2.4 (L)   Hemoglobin Latest Ref Range: 11.7 - 15.7 g/dL 11.1 (L)   Hematocrit Latest Ref Range: 35.0 - 47.0 % 35.1   Platelet Count Latest Ref Range: 150 - 450 10e9/L 210   RBC Count Latest Ref Range: 3.8 - 5.2 10e12/L 4.04   MCV Latest Ref Range: 78 - 100 fl 87   MCH Latest Ref Range: 26.5 - 33.0 pg 27.5   MCHC Latest Ref Range: 31.5 - 36.5 g/dL 31.6   RDW Latest Ref Range: 10.0 - 15.0 % 15.2 (H)   Diff Method Unknown Manual Differential   % Neutrophils Latest Units: % 42.0   % Lymphocytes Latest Units: % 24.0   % Monocytes Latest Units: % 29.0   % Eosinophils Latest Units: % 5.0   Absolute Neutrophil Latest Ref Range: 1.6 - 8.3 10e9/L 1.0 (L)   Absolute Lymphocytes Latest Ref Range: 0.8 - 5.3 10e9/L 0.6 (L)   Absolute Monocytes Latest Ref Range: 0.0 - 1.3 10e9/L 0.7   Absolute Eosinophils Latest Ref Range: 0.0 - 0.7 10e9/L 0.1   RBC Morphology  Unknown Normal   Platelet Estimate Unknown Automated count confirmed.  Platelet morphology is normal.     Results for KYLE GU (MRN [id number]) as of 12/22/2020 14:24   Ref. Range 6/30/2020 12:05 7/21/2020 10:40 8/11/2020 07:40 8/18/2020 08:30 9/29/2020 09:50 10/20/2020 11:40 11/11/2020 12:20 12/2/2020 09:10   CA 27-29 Latest Ref Range: 0 - 39 U/mL 308 (H) 238 (H) 290 (H) 335 (H) 341 (H) 489 (H) 434 (H) 396 (H)         Combined Report of:    PET and CT on  11/6/2020 10:44 AM :     1. PET of the neck, chest, abdomen, and pelvis.  2. PET CT Fusion for Attenuation Correction and Anatomical  Localization:    3. Diagnostic CT scan of the chest, abdomen, and pelvis with  intravenous contrast for interpretation.  3. CT of the chest, abdomen and pelvis obtained for diagnostic  interpretation.  4. 3D MIP and PET-CT fused images were processed on an independent  workstation and archived to PACS and reviewed by a radiologist.     Technique:     1. PET: The patient received 12.41 mCi of F-18-FDG; the serum glucose  was 100 and prior to administration, body weight was 59.2 kg. Images  were evaluated in the axial, sagittal, and coronal planes as well as  the rotational whole body MIP. Images were acquired from the Vertex to  the Feet.     UPTAKE WAS MEASURED AT 60 MINUTES.      BACKGROUND:  Liver SUV max= 3.4,   Aorta Blood SUV Max: 2.0.      2. CT: Volumetric acquisition for clinical interpretation of the  chest, abdomen, and pelvis acquired at 3 mm sections after the  uneventful administration of intravenous contrast. The chest, abdomen,  and pelvis were evaluated at 5 mm sections in bone, soft tissue, and  lung windows.       The patient received 80 cc. Of Isovue 370 intravenously and 500 mL  oral present contrast for the examination.       3. 3D MIP and PET-CT fused images were processed on an independent  workstation and archived to PACS and reviewed by a radiologist.     INDICATION: Invasive breast  cancer     ADDITIONAL INFORMATION OBTAINED FROM EMR: 60-year-old woman with a  history of invasive lobular carcinoma of the right breast diagnosed in  2006. With chemoradiation and hormonal therapy. Underwent bilateral  mastectomy 2016. Radiotherapy to the lumbar spine T12-L5 in September 2019. Currently on therapy with Eribulin.     COMPARISON: PET-CT 7/17/2020     FINDINGS:      HEAD/NECK:  There is no suspicious FDG uptake in the neck.      The paranasal sinuses are clear. The mastoid air cells are clear. The  mucosal pharyngeal space, the , prevertebral and carotid  spaces are within normal limits. No masses, mass effect or  pathologically enlarged lymph nodes are evident. The thyroid gland is  unremarkable.     CHEST:  There is no suspicious FDG uptake in the chest.      Postsurgical changes of bilateral mastectomy. No abnormal FDG uptake  within the soft tissues adjacent to the breast implants. No enlarged  or hypermetabolic lymph nodes in the axillae or elsewhere in the  chest.     Heart size is within normal limits. Normal caliber of the thoracic  aorta and main pulmonary artery. Resolution of the previously seen  left-sided pulmonary emboli. No new pulmonary emboli demonstrated.  Left chest wall Port-A-Cath catheter tip terminates at the superior  cavoatrial junction. Decreased FDG uptake to the esophagus, for  example distally with SUV max 4.6 (series 4, image 201), previously  5.7, suggesting decreased inflammation. There is a small sliding-type  hiatal hernia.     The central tracheobronchial tree is patent. No pleural effusion or  pneumothorax. No focal consolidation. Mild dependent atelectasis.  Linear attenuation within the lingula also favoring atelectasis. No  suspicious pulmonary nodule.      ABDOMEN AND PELVIS:  There is no suspicious FDG uptake in the abdomen or pelvis.     The liver, pancreas, spleen, and adrenal glands are unremarkable. The  gallbladder surgically absent. No  intrahepatic or extrahepatic ductal  or ductal dilatation. Symmetric nephrographic enhancement of the  kidneys. No hydronephrosis. Bladder is decompressed. Prominence of the  bladder wall may be secondary to the degree of nondistention. Atrophic  appearance of the uterus. Normal caliber of the small large bowel. No  free fluid or fluid collection. No enlarged or hypermetabolic lymph  nodes in the abdomen or pelvis. Normal caliber of the abdominal aorta.     LOWER EXTREMITIES:   No abnormal masses or hypermetabolic soft tissue lesions.     BONES:   Again demonstrated are the multiple mixed lytic and sclerotic lesions  throughout the axial skeleton including in the calvarium, spine, ribs,  sternum, and pelvis, as well as in the right femoral neck. Majority of  these lesions demonstrate low levels of FDG uptake are similar to  prior study. However, there are multiple lesions demonstrating mildly  increased FDG uptake since 7/17/2020. Examples include (series 4):   - Image 215: Increased uptake to the expansile lytic lesion involving  the T9 lamina and T10 right pedicle with SUV max 6.2, previously SUV  max 5.3.  - Image 312: Increased uptake to mixed sclerotic/lytic lesion in the  right iliac crest with SUV max for 0.8, previously 3.0.  - Image 184: Increased uptake to a subacute nondisplaced fracture in  the anterior left 4th rib, SUV max 3.3, previously no fracture with  SUV max 2.1.  - Image 187: Increased uptake to a lesion in the lateral right 7th  rib, which is located just anterior to an old fracture, SUV max 3.7,  previously 2.7.   - Image 339: Increased uptake to a mixed sclerotic/lytic lesion in the  left sacral ala near the sacroiliac joint, SUV max 4.3, previously  3.8.     New uptake to subtle lesion in the right manubrium with mild FDG  uptake, SUV max 2.8 (series 4, image 151)     The lesion in the anterior left fifth rib previously demonstrating  increased uptake demonstrates slightly decreased uptake  on the study  with SUV max 3.5 (series 4, image 210), previously 3.9. Appearances  suggestive of healing fracture. There are additional multiple chronic  rib fractures bilaterally demonstrating low levels of uptake similar  to minimally increased from prior study.     Multilevel degenerative changes in the spine. Unchanged severe  compression deformity of L1 with greater than 75% height loss.  Unchanged relative photopenia of the T12-L5 vertebral bodies, in  keeping with history of radiotherapy.                                                                      IMPRESSION: In this patient with a history of metastatic breast cancer  status post bilateral mastectomies, chemoradiation, and currently  undergoing therapy with Eribulin, there is disease progression since  7/17/2020:     1. Worsening metastatic disease to the bones:  1a. Increased FDG uptake to the lytic lesions in the T9/T10 right  posterolateral elements.  1b. Increased FDG uptake to a previously non-hypermetabolic mixed  sclerotic/lytic lesion in the right iliac bone, suggesting recurrence  of a previously treated metastasis.  1c. New subtle lesion in the right manubrium.  1d.  New fracture in the anterolateral left 4th rib with associated  uptake. This may be a pathologic fracture.  1e. Decreased uptake to the previously increased left anterior 5th rib  lesion, which appears to be a healing fracture.     2. Resolution of the previously new left pulmonary emboli.     3. Decreased FDG uptake to the esophagus, consistent with decreased  inflammation.    ASSESSMENT AND PLAN:   1.  History of stage IIB, T2 N1 M0 invasive lobular carcinoma of the right breast.  It was initially diagnosed in 2006 and at that time it was hormone receptor positive, HER-2 negative.  She was treated on ECOG 2104 protocol with dose-dense Adriamycin, Cytoxan and Avastin x4 cycles followed by Taxol and Avastin x4 cycles followed by a Avastin for 1 year.  She also received  radiation to the right breast which she completed in January 2007 (5040 cGy).  She took Aromasin from 2007 to 2014.    Now she has metastatic breast cancer with extensive involvement of the bones.  There is also a left lower lobe hypermetabolic lesion and mediastinal lymph nodes which are hypermetabolic.  The biopsy from the right iliac bone is consistent with metastatic lobular breast cancer but it is hormone receptor and HER-2 negative and PDL 1 is also negative.    She has received 3000 cGy of palliative radiation to T12-L5 from 9/6/2019 till 9/18/2019.    She was started on palliative Xeloda but she was unable to tolerate even the dose reduced medication.        We decided on repeating scans and MRI brain on 10/25/2019 showed no intracranial metastatic disease.   Multiple enhancing calvarial lesions may be increased in number and are suggestive of metastatic disease. Thinner imaging on the current study may identify the smaller lesions and may be responsible for identified more lesions.   There is a single focus of T2 hyperintense signal within the anterior right temporal lobe may represent interval demyelination. There is no evidence for active demyelination.    PET/CT on 11/1/2019 showed favorable response to therapy and Overall FDG uptake within scattered osseous metastases is decreased since 8/30/2019, particularly within the pelvis. Increased sclerotic appearance of L1 and L4 pathologic compression deformities, likely sequela of recently completed palliative radiation therapy.    No significant FDG uptake in previously demonstrated hypermetabolic mediastinal lymph nodes. Biopsy negative on 9/5/2019.  There is also decreased FDG uptake in the left lower lobe (biopsy negative for malignancy), now with dense consolidation containing air bronchograms suggestive of infection versus aspiration.  There is diffuse FDG uptake within the esophagus, consistent with esophagitis.    Because of intolerance to Xeloda we  decided on switching to weekly paclitaxel on 11/5/2019.    For better tolerance we decreased the dose of paclitaxel to 70 mg/m  with cycle #2.  She has completed 3 cycles of Taxol and the last chemotherapy was on 1/14/2020.      PET/CT on 1/24/2020 showed progression of the disease in some of the bone lesions including increase in size and lytic lesion within the vertebral body of C4 measuring 1 cm as opposed to 0.6 cm previously and a new central soft tissue nodule with SUV max 4.1 when previously it was non-hypermetabolic.  There is also some progression noted in the right proximal femur and right iliac bone.  There is decreased metabolic uptake in the right lamina of T9 with SUV max 4.7 when previously it was 8.0.  Other lesions are stable.    There are no pathologically enlarged mediastinal, hilar or axillary lymph nodes. Calcified subcarinal lymph nodes. There are no suspicious lung nodules or evidence for infection and previous left lower lobe consolidation has resolved.     CA 27-29 also had increased significantly and it was 257 on 1/28/2020.    Due to progression we switched to eribulin on 1/28/2020.        After 3 cycles, she had a repeat PET/CT on 3/27/2020 which showed a stable size of the bone metastatic lesions although the FDG avidity was less, consistent with treatment response.    She had MRI of the thoracic spine on 4/3/2020 and it was compared to the one which was done in August 2019 and it showed increased size of several metastatic lesions most notably at T9 but also at T5-T7.  Other lesions at T10, T11 and T12 appeared improved.    CA 27-29 was also down to 222 on 3/18/2020.    I believe overall this is consistent with a partial response to the treatment.  Overall she is tolerating eribulin well with some worsening of neuropathy and cytopenias.     We decided on continuing the chemotherapy with some modifications and she got cycle #4 with slightly decreased dose of eribulin to 1.2 mg/m2.       After 8 cycles of eribulin repeat PET CT on 7/17/2020 showed only minimally increased FDG uptake in T9 and T10 and in the left anterior fifth rib near the costochondral junction.  That is slightly decreased FDG avidity in the right femoral neck lesion.  Otherwise bone disease is a stable.  No other evidence of metastatic disease is found.    CA 27-29 on 6/30/2020 was 308.    We decided on continuing the same chemotherapy because overall clinical picture was consistent with stable to only minimally progressive disease and she was tolerating treatments well with decent quality of life.    Cycle #10 was delayed by 1 week because ANC was 0.9.        After 13 cycles of eribulin, PET scan on 11/6/2020 shows progression of the disease with increased FDG uptake in the lytic lesions in the T9/T10 and right posterolateral elements as well as increased FDG uptake in the previously known hypermetabolic right iliac bone lesion suggesting recurrence of previously treated metastasis.  There is new subtle lesion in the right manubrium.  There is also a new fracture in the anterolateral left fourth rib with associated uptake and this could be a pathologic fracture.  Healing fracture in the left anterior fifth rib lesion.  There is resolution of the left pulmonary emboli.  Decreased FDG uptake of the esophagus consistent with improving inflammation.    She is doing pretty well and her performance status is ECOG 1.    Because of progression of the disease, I recommend switching to recently FDA approved sacituzumab govitecan-hziy (Trodelvy) for TNBC, based on the results of the phase II IMMU-132-01 clinical trial.   We discussed the rational schedule and potential side effects of it in detail.    We gave it to her on 11/11/2020.    She had significant nausea and vomiting with it but otherwise tolerated it well.   She started cycle #2 on 12/2/2020 and received day 8 on 12/8/2020.  She then got admitted for nausea vomiting  dehydration and weakness.     She is doing a little better today.  She has had a very rough time with this chemotherapy so we discussed that we will defer the chemotherapy today especially because she also has neutropenia and going forward I will decrease the dose of chemotherapy by 25%.       Nausea and vomiting.  Continue Emend Decadron and Zofran.  Continue Compazine as needed.  She also has sublingual Zofran if need be.  She does get headaches with Zofran but she can try if the nausea is not under control even after reducing the dose of chemotherapy and even after taking Compazine.      Diarrhea.  Use Imodium as needed.    Bone disease.  She has metastatic disease to the bone.  Previously she got palliative radiation to T12-L5 3000 cGy in 10 fractions from 9/6/2019 till 9/18/2019.  She was evaluated by orthopedics Dr. Bipin Elliott on 11/1/2019 and conservative management was recommended.    We are giving her Zometa every 3 months. She last received on 9/29/2020.  Because of progression of the disease in the bones, I recommended switching to Xgeva which she received on 11/11/2020.  She will get Xgeva today 12/22/2020. Cont D and Ca.     Leukopenia/Neutropenia.  ANC is 1.0.  No infections.  Hold chemotherapy today and delayed by 1 week.  Going forward reduce the dose of chemotherapy to 75%.    Anemia.  She has mild anemia from chemotherapy.  Continue to observe.    Pain management.  She somehow has not been taking MS Contin as she ran out.  I refilled it today.  Continue morphine immediate release as needed and follow-up with palliative care.        Subcutaneous nodule in the scalp.  I am not certain whether it is a cyst or a metastatic deposit.  Currently it is not bothering her.  For now she tells me that it is stable in size.  Continue to observe.     Bilateral pulmonary emboli.  Continue Eliquis for incidentally found PE on 7/17/2020.       Neuropathy.  She has mild neuropathy.  Up until now it has not  worsened on Trodelvy.  Continue to observe.  She has chronic baseline balance issues and heat and cold sensitivity from underlying multiple sclerosis.        We did not address the following today.    Sleeping problems.  Continue trazodone.    Vision changes.    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  It was thought that this could be due to Taxol as patient reported to the ophthalmologist that she was on Taxol in September 2019 when her symptoms started.  But she was not on Taxol in September 2019 when she started noticing the visual changes so Taxol is not responsible for this.  The first dose of Taxol was on 11/5/2019.   Her vision is much better now.  She will continue to follow closely with ophthalmology.       Increased FDG ability in the colon.  She had FDG uptake in the cecum and ascending colon but no corresponding lesion was seen on the CT scan.  She is completely asymptomatic so at this time we will continue to observe.    Discussion regarding healthcare directives.  On previous visit she told me that she will bring the health care directive for our records but she forgot so I told her to bring it on next visit.      Breast implant removal.  She tells me that she was planning to do breast implant removal because there was a recall on this.  Her surgery was scheduled for 9/24/2019.  Because of this new development of metastatic breast cancer and her recent radiation, surgery has been canceled.  We discussed that at this time treatment for metastatic breast cancer would take precedence over the other surgery as the other surgery would require her to be off chemotherapy for several weeks and in that case the chances of cancer progression would be high and I would not recommend that.    Multiple sclerosis.  She will continue to follow with her neurologist Dr. Quiles.  Currently multiple sclerosis is under control and currently she is not taking any  medications.    Return to clinic next week for delayed cycle number 3-day #1 chemotherapy.  See me back 1/19/2020 before cycle #4.     All of her and her 's questions were answered to their satisfaction.  They are agreeable and comfortable with the plan.      Dewayne Zepeda MD    Video start time. 3:07 PM  Video stop time. 3:27 PM

## 2020-12-22 NOTE — NURSING NOTE
"Shaneka Alvarez is a 60 year old female who is being evaluated via a billable video visit.      The patient has been notified of following:     \"This video visit will be conducted via a call between you and your physician/provider. We have found that certain health care needs can be provided without the need for an in-person physical exam.  This service lets us provide the care you need with a video conversation.  If a prescription is necessary we can send it directly to your pharmacy.  If lab work is needed we can place an order for that and you can then stop by our lab to have the test done at a later time.    Video visits are billed at different rates depending on your insurance coverage.  Please reach out to your insurance provider with any questions.    If during the course of the call the physician/provider feels a video visit is not appropriate, you will not be charged for this service.\"    Patient has given verbal consent for Video visit? Yes  How would you like to obtain your AVS? MyChart  If you are dropped from the video visit, the video invite should be resent to: Text to cell phone: [phone]  Will anyone else be joining your video visit? Yes: . How would they like to receive their invitation? Text to cell phone: [phone]      Video-Visit Details    Type of service:  Video Visit    Originating Location (pt. Location): Other in clinic    Distant Location (provider location):  [facility]     Platform used for Video Visit: Imtiaz Thakur CMA        "

## 2020-12-22 NOTE — LETTER
12/22/2020         RE: Shaneka Alvarez  [address] [facility] [id number]        Dear Colleague,    Thank you for referring your patient, Shaneka Alvarez, to the [facility]. Please see a copy of my visit note below.      ONCOLOGY FOLLOW UP NOTE: 12/22/2020        I took the history from reviewing the previous notes that she was following with Dr. Amaya.  I have copied and updated from prior notes.    ONCOLOGY History:    1.  January 2006:  Diagnosed with Stage IIB, T2 N1 M0 invasive lobular carcinoma of the right breast.  Final pathology showed a 4.5 x 3.8 x 2.5 cm, 01/14 lymph nodes positive.  Estrogen, progesterone receptor positive, HER-2 negative.     2.  Genetics.  BRCA1 and 2 mutations not detected. 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       THERAPY TO DATE:   1. 2006:  ECOG 2104 protocol of dose-dense Adriamycin, Cytoxan and Avastin x4 cycles followed by Taxol and Avastin x4 cycles.   2.  03/2007:  Completed 1 year Avastin.   3.  11/2006-01/2007:  Radiotherapy to the right breast of 5040 cGy.   4.  [id number]:  Aromasin.  Stopped after moving to the [facility].   5.  01/2016:  Right modified radical mastectomy with latissimus dorsi flap reconstruction and a left prophylactic mastectomy with latissimus dorsi flap reconstruction.     She recently had MRI of the brain as a follow-up for multiple sclerosis and there were multiple calvarial lesions noted which was suspicious for metastatic disease.  There was no evidence of new multiple sclerosis lesions.  She had a PET scan on 8/30/2019 which showed widespread bone metastatic lesions (calvarium, spine, sacrum, pelvis, ribs most prominent at C7, T3, L1 and L4), hypermetabolic 3 cm lesion in LLL, and mediastinal/hilar LN. CEA wnl at 1.9 and C27-29 elevated to 415 (28 on 8/23/16). A CT guided biopsy of the right iliac bone was obtained on 9/4/19, pathology consistent with metastatic adenocarcinoma  (breast), ER/MA negative, HER-2 negative PDL 1 < 1%. A second biopsy was take of the LLL nodule via EBUS on 9/5/19 did not show any malignancy.    C/T/L spine MRI 8/3/19 to 9/2/19 with numerous enhancing lesions of the C, T and L spine, largest around T9 and L1. No evidence of cord compression. Has a L1 compression fracture and impending L4.     Received radiation T12-L5 3000 cGy in 10 fractions from 9/6/2019 till 9/18/2019.  Neurosurgery recommended no surgical intervention but to wear Gorge brace when out of bed and HOB >30 degrees    She started palliative Xeloda 2000/1500 on 10/1/2019 but after just a few doses she noticed nausea, red eyes blurred vision, loss of appetite.  She stopped taking it after 5 doses and was seen by nurse practitioner on 10/7/2019 when she was improving.  At that point she was restarted on Xeloda at a lower dose of 1000 mg twice a day.  As she was not able to tolerate even the lower dose with extreme nausea and vomiting and feeling extremely fatigued and blurry vision, we decided on stopping Xeloda.    We decided on repeating scans and MRI brain on 10/25/2019 showed no intracranial metastatic disease.   Multiple enhancing calvarial lesions may be increased in number and are suggestive of metastatic disease. Thinner imaging on the current study may identify the smaller lesions and may be responsible for  identified more lesions.   There is a single focus of T2 hyperintense signal within the anterior right temporal lobe may represent interval demyelination. There is no evidence for active demyelination.    PET/CT on 11/1/2019 showed favorable response to therapy and Overall FDG uptake within scattered osseous metastases is decreased since 8/30/2019, particularly within the pelvis. Increased sclerotic appearance of L1 and L4 pathologic compression deformities, likely sequela of recently completed palliative radiation therapy.    No significant FDG uptake in previously demonstrated  hypermetabolic mediastinal lymph nodes. Biopsy negative on 9/5/2019.  There is also decreased FDG uptake in the left lower lobe (biopsy negative for  malignancy), now with dense consolidation containing air bronchograms suggestive of infection versus aspiration.  There is diffuse FDG uptake within the esophagus, consistent with esophagitis.    Because of intolerance to Xeloda we decided on switching to weekly paclitaxel on 11/5/2019.    C#2 Taxol 12/4/19- started with 70mg/m2 dose reduction    C#3 Taxol 12/30/2019     PET/CT on 1/24/2020 showed progression of the disease in some of the bone lesions including increase in size and lytic lesion within the vertebral body of C4 measuring 1 cm as opposed to 0.6 cm previously and a new central soft tissue nodule with SUV max 4.1 when previously it was non-hypermetabolic.  There is also some progression noted in the right proximal femur and right iliac bone.  There is decreased metabolic uptake in the right lamina of T9 with SUV max 4.7 when previously it was 8.0.  Other lesions are stable.    CA 27-29 also had increased significantly and it was 257 on 1/28/2020.    We decided on switching to eribulin on 1/28/2020.    C#2 2/18/2020  C#2 D#8 2/25/2020    C#3 D#1 3/18/2020 -delayed by 1 week as per patient's preference because she wanted to visit her family.    She had a repeat PET/CT on 3/27/2020 which showed stable size of the bone metastatic lesions although the FDG avidity was less, consistent with treatment response.    She had MRI of the thoracic spine on 4/3/2020 and it was compared to the one which was done in August 2019 and it showed increased size of several metastatic lesions most notably at T9 but also at T5-T7.  Other lesions at T10, T11 and T12 appeared improved.    CA 27-29 was also down to 222 on 3/18/2020.    Cycle #4 eribulin ( dose reduced to 1.2 mg/m2 )-4/7/2020-   Cycle #5 Eribulin ( 1.2 mg/m2 )- 4/28/2020   Cycle #6 Eribulin ( 1.2 mg/m2 )- 5/19/2020      Cycle #7 Eribulin ( 1.2 mg/m2 )- 6/9/2020    Cycle #8 Eribulin ( 1.2 mg/m2 )- 6/30/2020     She had a repeat PET scan on 7/17/2020 which showed incidental finding of new acute bilateral pulmonary embolism in the distal left main pulmonary artery extending in the left upper and lower lobes and in the segmental and branch arteries in the right lower lobe.    It also showed mildly increased FDG avidity in the lytic lesion in the T9 lamina and right pedicle with SUV max 5.3, previously 3.9.  That is also slightly increased FDG uptake to the left anterior fifth rib at the costochondral junction SUV max 3.9, previously 2.5.  There is slightly decreased FDG uptake in the right femoral neck lesion SUV max 2.2, previously 2.9.  FDG uptake in other bone lesions is fairly stable.  No new metastasis are seen.    CA 27-29 was 308 on 6/30/2020.  It was 286 on 5/19/2020.    Overall this is consistent with only slight progression versus stable disease.    She was started on Lovenox.    Cycle #9 eribulin 7/21/2020. CA 27-29 was 238    C#10 eribulin 8/18/2020. ( delayed by one week for ANC 0.9 )    C#11 Eribulin 9/8/2020    C#12 Eribulin 9/29/2020     C#13 Eribulin 10/20/2020     PET scan on 11/6/2020 shows progression of the disease with increased FDG uptake in the lytic lesions in the T9/T10 and right posterolateral elements as well as increased FDG uptake in the previously known hypermetabolic right iliac bone lesion suggesting recurrence of previously treated metastasis.  There is new subtle lesion in the right manubrium.  There is also a new fracture in the anterolateral left fourth rib with associated uptake and this could be a pathologic fracture.  Healing fracture in the left anterior fifth rib lesion.  There is resolution of the left pulmonary emboli.  Decreased FDG uptake of the esophagus consistent with improving inflammation.    CA 27-29 on 1020/2020 was also elevated at 489.    C#1 Trodelvy on 11/11/2020.  Cycle #2  Trodelvy 12/2/2020  Cycle number 2-day #8 Trodelvy 12/8/2020.    12/15/2020-12/16/2020.  She was admitted to the hospital with nausea vomiting and dehydration.  She had tachycardia and initial lactic acid of 5.  It decreased to 1.4 after 2 L of normal saline.  She also had hypokalemia and ANC was 1.3.  She was treated with IV fluids.  Procalcitonin was negative.  CTA of the chest was negative for pulmonary embolism or pneumonia.  No bacterial infection was documented.  Her medication which she was initially unable to tolerate at home, but restarted and she was discharged home once started to feel better.      Interval history.  This is a video visit.  Her  is also available during the visit.  She has been feeling more tired with this chemotherapy.  Her nausea and vomiting was also worse.  She had some diarrhea for a couple of days.  She also tells me that over the last few weeks she ran out off MS Contin so has been taking 2-4 immediate release morphine every day to control the pain.  She been having some more pain in the back.  No new pain.  No new swellings.  The swelling at the back of the scalp is about the same.  No fevers or infections or shortness of breath.  No worsening neuropathy.  She has lost about 3 pounds.  Last few days have been better.      ECOG 1    ROS:  A comprehensive ROS was otherwise neg          I reviewed other history in epic as below.       PAST MEDICAL HISTORY:     1.  Breast cancer  2.  Multiple sclerosis.   3.  Depression.   4.  Migraines.   5.  Hypertension.   6.  Cholecystectomy and umbilical hernia repair.     SOCIAL HISTORY: She smoked for 5 years but quit many years ago.  Drinks alcohol socially.  She lives with her .  She is a teacher in middle school.       FAMILY HISTORY: She mentions that her mother had breast cancer at 49.  Both grandmothers had breast cancer but she is not sure of the age.  A couple of cousins have cancers.  Patient has 3 children who are  "healthy.    Current Outpatient Medications   Medication     acetaminophen (TYLENOL) 500 MG tablet     apixaban ANTICOAGULANT (ELIQUIS) 5 MG tablet     busPIRone (BUSPAR) 15 MG tablet     calcium citrate-vitamin D (CITRACAL) 315-250 MG-UNIT TABS     Cholecalciferol (VITAMIN D3 PO)     cyclopentolate (CYCLOGYL) 1 % ophthalmic solution     diclofenac (VOLTAREN) 1 % topical gel     ibuprofen (ADVIL/MOTRIN) 600 MG tablet     loperamide (IMODIUM) 2 MG capsule     LORazepam (ATIVAN) 1 MG tablet     magnesium 250 MG tablet     meclizine (ANTIVERT) 25 MG tablet     morphine (MS CONTIN) 15 MG CR tablet     morphine (MSIR) 15 MG IR tablet     naloxone (NARCAN) 1 mg/mL for intranasal kit (2 syringes with 2 mucosal atomizer device)     ondansetron (ZOFRAN-ODT) 8 MG ODT tab     polyethylene glycol (MIRALAX/GLYCOLAX) packet     potassium & sodium phosphates (NEUTRA-PHOS) 280-160-250 MG Packet     potassium chloride ER (KLOR-CON M) 10 MEQ CR tablet     prochlorperazine (COMPAZINE) 10 MG tablet     propranolol ER (INDERAL LA) 80 MG 24 hr capsule     senna-docusate (SENOKOT-S/PERICOLACE) 8.6-50 MG tablet     syringe/needle, disp, (B-D INTEGRA SYRINGE) 23G X 1\" 3 ML MISC     traZODone (DESYREL) 50 MG tablet     venlafaxine (EFFEXOR-XR) 75 MG 24 hr capsule     No current facility-administered medications for this visit.         Allergies   Allergen Reactions     Bactrim [Sulfamethoxazole W/Trimethoprim]      Internal bleeding     Copaxone [Glatiramer] Hives          PHYSICAL EXAMINATION:     There were no vitals taken for this visit.  Wt Readings from Last 4 Encounters:   12/16/20 57.7 kg (127 lb 3.2 oz)   12/02/20 62.2 kg (137 lb 1.6 oz)   11/30/20 56.7 kg (125 lb)   11/18/20 60.6 kg (133 lb 8 oz)         Constitutional.  Looks well and in no apparent distress.   Eyes.  Without eye redness or apparent jaundice.   Respiratory.  Non labored breathing. Speaking in full sentences.    Skin.  No concerning skin rashes on the skin " visualized.   Neurological.  Is alert and oriented.  Psychiatric.  Mood and affect seem appropriate.      The rest of a comprehensive physical examination is deferred due to Public Health Emergency video visit restrictions.        Labs and Imaging.    Reviewed.  Results for KYLE GU (MRN [id number]) as of 12/22/2020 14:24   Ref. Range 12/22/2020 14:25   Sodium Latest Ref Range: 133 - 144 mmol/L 136   Potassium Latest Ref Range: 3.4 - 5.3 mmol/L 4.0   Chloride Latest Ref Range: 94 - 109 mmol/L 104   Carbon Dioxide Latest Ref Range: 20 - 32 mmol/L 28   Urea Nitrogen Latest Ref Range: 7 - 30 mg/dL 6 (L)   Creatinine Latest Ref Range: 0.52 - 1.04 mg/dL 0.56   GFR Estimate Latest Ref Range: >60 mL/min/1.73_m2 >90   GFR Estimate If Black Latest Ref Range: >60 mL/min/1.73_m2 >90   Calcium Latest Ref Range: 8.5 - 10.1 mg/dL 8.5   Anion Gap Latest Ref Range: 3 - 14 mmol/L 4   Magnesium Latest Ref Range: 1.6 - 2.3 mg/dL 2.2   Phosphorus Latest Ref Range: 2.5 - 4.5 mg/dL 2.2 (L)   Albumin Latest Ref Range: 3.4 - 5.0 g/dL 3.2 (L)   Protein Total Latest Ref Range: 6.8 - 8.8 g/dL 6.5 (L)   Bilirubin Total Latest Ref Range: 0.2 - 1.3 mg/dL 0.3   Alkaline Phosphatase Latest Ref Range: 40 - 150 U/L 84   ALT Latest Ref Range: 0 - 50 U/L 35   AST Latest Ref Range: 0 - 45 U/L 20   Glucose Latest Ref Range: 70 - 99 mg/dL 107 (H)   WBC Latest Ref Range: 4.0 - 11.0 10e9/L 2.4 (L)   Hemoglobin Latest Ref Range: 11.7 - 15.7 g/dL 11.1 (L)   Hematocrit Latest Ref Range: 35.0 - 47.0 % 35.1   Platelet Count Latest Ref Range: 150 - 450 10e9/L 210   RBC Count Latest Ref Range: 3.8 - 5.2 10e12/L 4.04   MCV Latest Ref Range: 78 - 100 fl 87   MCH Latest Ref Range: 26.5 - 33.0 pg 27.5   MCHC Latest Ref Range: 31.5 - 36.5 g/dL 31.6   RDW Latest Ref Range: 10.0 - 15.0 % 15.2 (H)   Diff Method Unknown Manual Differential   % Neutrophils Latest Units: % 42.0   % Lymphocytes Latest Units: % 24.0   % Monocytes Latest Units: % 29.0   % Eosinophils  Latest Units: % 5.0   Absolute Neutrophil Latest Ref Range: 1.6 - 8.3 10e9/L 1.0 (L)   Absolute Lymphocytes Latest Ref Range: 0.8 - 5.3 10e9/L 0.6 (L)   Absolute Monocytes Latest Ref Range: 0.0 - 1.3 10e9/L 0.7   Absolute Eosinophils Latest Ref Range: 0.0 - 0.7 10e9/L 0.1   RBC Morphology Unknown Normal   Platelet Estimate Unknown Automated count confirmed.  Platelet morphology is normal.     Results for KYLE GU (MRN [id number]) as of 12/22/2020 14:24   Ref. Range 6/30/2020 12:05 7/21/2020 10:40 8/11/2020 07:40 8/18/2020 08:30 9/29/2020 09:50 10/20/2020 11:40 11/11/2020 12:20 12/2/2020 09:10   CA 27-29 Latest Ref Range: 0 - 39 U/mL 308 (H) 238 (H) 290 (H) 335 (H) 341 (H) 489 (H) 434 (H) 396 (H)         Combined Report of:    PET and CT on  11/6/2020 10:44 AM :     1. PET of the neck, chest, abdomen, and pelvis.  2. PET CT Fusion for Attenuation Correction and Anatomical  Localization:    3. Diagnostic CT scan of the chest, abdomen, and pelvis with  intravenous contrast for interpretation.  3. CT of the chest, abdomen and pelvis obtained for diagnostic  interpretation.  4. 3D MIP and PET-CT fused images were processed on an independent  workstation and archived to PACS and reviewed by a radiologist.     Technique:     1. PET: The patient received 12.41 mCi of F-18-FDG; the serum glucose  was 100 and prior to administration, body weight was 59.2 kg. Images  were evaluated in the axial, sagittal, and coronal planes as well as  the rotational whole body MIP. Images were acquired from the Vertex to  the Feet.     UPTAKE WAS MEASURED AT 60 MINUTES.      BACKGROUND:  Liver SUV max= 3.4,   Aorta Blood SUV Max: 2.0.      2. CT: Volumetric acquisition for clinical interpretation of the  chest, abdomen, and pelvis acquired at 3 mm sections after the  uneventful administration of intravenous contrast. The chest, abdomen,  and pelvis were evaluated at 5 mm sections in bone, soft tissue, and  lung windows.       The  patient received 80 cc. Of Isovue 370 intravenously and 500 mL  oral present contrast for the examination.       3. 3D MIP and PET-CT fused images were processed on an independent  workstation and archived to PACS and reviewed by a radiologist.     INDICATION: Invasive breast cancer     ADDITIONAL INFORMATION OBTAINED FROM EMR: 60-year-old woman with a  history of invasive lobular carcinoma of the right breast diagnosed in  2006. With chemoradiation and hormonal therapy. Underwent bilateral  mastectomy 2016. Radiotherapy to the lumbar spine T12-L5 in September 2019. Currently on therapy with Eribulin.     COMPARISON: PET-CT 7/17/2020     FINDINGS:      HEAD/NECK:  There is no suspicious FDG uptake in the neck.      The paranasal sinuses are clear. The mastoid air cells are clear. The  mucosal pharyngeal space, the , prevertebral and carotid  spaces are within normal limits. No masses, mass effect or  pathologically enlarged lymph nodes are evident. The thyroid gland is  unremarkable.     CHEST:  There is no suspicious FDG uptake in the chest.      Postsurgical changes of bilateral mastectomy. No abnormal FDG uptake  within the soft tissues adjacent to the breast implants. No enlarged  or hypermetabolic lymph nodes in the axillae or elsewhere in the  chest.     Heart size is within normal limits. Normal caliber of the thoracic  aorta and main pulmonary artery. Resolution of the previously seen  left-sided pulmonary emboli. No new pulmonary emboli demonstrated.  Left chest wall Port-A-Cath catheter tip terminates at the superior  cavoatrial junction. Decreased FDG uptake to the esophagus, for  example distally with SUV max 4.6 (series 4, image 201), previously  5.7, suggesting decreased inflammation. There is a small sliding-type  hiatal hernia.     The central tracheobronchial tree is patent. No pleural effusion or  pneumothorax. No focal consolidation. Mild dependent atelectasis.  Linear attenuation  within the lingula also favoring atelectasis. No  suspicious pulmonary nodule.      ABDOMEN AND PELVIS:  There is no suspicious FDG uptake in the abdomen or pelvis.     The liver, pancreas, spleen, and adrenal glands are unremarkable. The  gallbladder surgically absent. No intrahepatic or extrahepatic ductal  or ductal dilatation. Symmetric nephrographic enhancement of the  kidneys. No hydronephrosis. Bladder is decompressed. Prominence of the  bladder wall may be secondary to the degree of nondistention. Atrophic  appearance of the uterus. Normal caliber of the small large bowel. No  free fluid or fluid collection. No enlarged or hypermetabolic lymph  nodes in the abdomen or pelvis. Normal caliber of the abdominal aorta.     LOWER EXTREMITIES:   No abnormal masses or hypermetabolic soft tissue lesions.     BONES:   Again demonstrated are the multiple mixed lytic and sclerotic lesions  throughout the axial skeleton including in the calvarium, spine, ribs,  sternum, and pelvis, as well as in the right femoral neck. Majority of  these lesions demonstrate low levels of FDG uptake are similar to  prior study. However, there are multiple lesions demonstrating mildly  increased FDG uptake since 7/17/2020. Examples include (series 4):   - Image 215: Increased uptake to the expansile lytic lesion involving  the T9 lamina and T10 right pedicle with SUV max 6.2, previously SUV  max 5.3.  - Image 312: Increased uptake to mixed sclerotic/lytic lesion in the  right iliac crest with SUV max for 0.8, previously 3.0.  - Image 184: Increased uptake to a subacute nondisplaced fracture in  the anterior left 4th rib, SUV max 3.3, previously no fracture with  SUV max 2.1.  - Image 187: Increased uptake to a lesion in the lateral right 7th  rib, which is located just anterior to an old fracture, SUV max 3.7,  previously 2.7.   - Image 339: Increased uptake to a mixed sclerotic/lytic lesion in the  left sacral ala near the sacroiliac  joint, SUV max 4.3, previously  3.8.     New uptake to subtle lesion in the right manubrium with mild FDG  uptake, SUV max 2.8 (series 4, image 151)     The lesion in the anterior left fifth rib previously demonstrating  increased uptake demonstrates slightly decreased uptake on the study  with SUV max 3.5 (series 4, image 210), previously 3.9. Appearances  suggestive of healing fracture. There are additional multiple chronic  rib fractures bilaterally demonstrating low levels of uptake similar  to minimally increased from prior study.     Multilevel degenerative changes in the spine. Unchanged severe  compression deformity of L1 with greater than 75% height loss.  Unchanged relative photopenia of the T12-L5 vertebral bodies, in  keeping with history of radiotherapy.                                                                      IMPRESSION: In this patient with a history of metastatic breast cancer  status post bilateral mastectomies, chemoradiation, and currently  undergoing therapy with Eribulin, there is disease progression since  7/17/2020:     1. Worsening metastatic disease to the bones:  1a. Increased FDG uptake to the lytic lesions in the T9/T10 right  posterolateral elements.  1b. Increased FDG uptake to a previously non-hypermetabolic mixed  sclerotic/lytic lesion in the right iliac bone, suggesting recurrence  of a previously treated metastasis.  1c. New subtle lesion in the right manubrium.  1d.  New fracture in the anterolateral left 4th rib with associated  uptake. This may be a pathologic fracture.  1e. Decreased uptake to the previously increased left anterior 5th rib  lesion, which appears to be a healing fracture.     2. Resolution of the previously new left pulmonary emboli.     3. Decreased FDG uptake to the esophagus, consistent with decreased  inflammation.    ASSESSMENT AND PLAN:   1.  History of stage IIB, T2 N1 M0 invasive lobular carcinoma of the right breast.  It was initially  diagnosed in 2006 and at that time it was hormone receptor positive, HER-2 negative.  She was treated on ECOG 2104 protocol with dose-dense Adriamycin, Cytoxan and Avastin x4 cycles followed by Taxol and Avastin x4 cycles followed by a Avastin for 1 year.  She also received radiation to the right breast which she completed in January 2007 (5040 cGy).  She took Aromasin from 2007 to 2014.    Now she has metastatic breast cancer with extensive involvement of the bones.  There is also a left lower lobe hypermetabolic lesion and mediastinal lymph nodes which are hypermetabolic.  The biopsy from the right iliac bone is consistent with metastatic lobular breast cancer but it is hormone receptor and HER-2 negative and PDL 1 is also negative.    She has received 3000 cGy of palliative radiation to T12-L5 from 9/6/2019 till 9/18/2019.    She was started on palliative Xeloda but she was unable to tolerate even the dose reduced medication.        We decided on repeating scans and MRI brain on 10/25/2019 showed no intracranial metastatic disease.   Multiple enhancing calvarial lesions may be increased in number and are suggestive of metastatic disease. Thinner imaging on the current study may identify the smaller lesions and may be responsible for identified more lesions.   There is a single focus of T2 hyperintense signal within the anterior right temporal lobe may represent interval demyelination. There is no evidence for active demyelination.    PET/CT on 11/1/2019 showed favorable response to therapy and Overall FDG uptake within scattered osseous metastases is decreased since 8/30/2019, particularly within the pelvis. Increased sclerotic appearance of L1 and L4 pathologic compression deformities, likely sequela of recently completed palliative radiation therapy.    No significant FDG uptake in previously demonstrated hypermetabolic mediastinal lymph nodes. Biopsy negative on 9/5/2019.  There is also decreased FDG uptake in  the left lower lobe (biopsy negative for malignancy), now with dense consolidation containing air bronchograms suggestive of infection versus aspiration.  There is diffuse FDG uptake within the esophagus, consistent with esophagitis.    Because of intolerance to Xeloda we decided on switching to weekly paclitaxel on 11/5/2019.    For better tolerance we decreased the dose of paclitaxel to 70 mg/m  with cycle #2.  She has completed 3 cycles of Taxol and the last chemotherapy was on 1/14/2020.      PET/CT on 1/24/2020 showed progression of the disease in some of the bone lesions including increase in size and lytic lesion within the vertebral body of C4 measuring 1 cm as opposed to 0.6 cm previously and a new central soft tissue nodule with SUV max 4.1 when previously it was non-hypermetabolic.  There is also some progression noted in the right proximal femur and right iliac bone.  There is decreased metabolic uptake in the right lamina of T9 with SUV max 4.7 when previously it was 8.0.  Other lesions are stable.    There are no pathologically enlarged mediastinal, hilar or axillary lymph nodes. Calcified subcarinal lymph nodes. There are no suspicious lung nodules or evidence for infection and previous left lower lobe consolidation has resolved.     CA 27-29 also had increased significantly and it was 257 on 1/28/2020.    Due to progression we switched to eribulin on 1/28/2020.        After 3 cycles, she had a repeat PET/CT on 3/27/2020 which showed a stable size of the bone metastatic lesions although the FDG avidity was less, consistent with treatment response.    She had MRI of the thoracic spine on 4/3/2020 and it was compared to the one which was done in August 2019 and it showed increased size of several metastatic lesions most notably at T9 but also at T5-T7.  Other lesions at T10, T11 and T12 appeared improved.    CA 27-29 was also down to 222 on 3/18/2020.    I believe overall this is consistent with a  partial response to the treatment.  Overall she is tolerating eribulin well with some worsening of neuropathy and cytopenias.     We decided on continuing the chemotherapy with some modifications and she got cycle #4 with slightly decreased dose of eribulin to 1.2 mg/m2.      After 8 cycles of eribulin repeat PET CT on 7/17/2020 showed only minimally increased FDG uptake in T9 and T10 and in the left anterior fifth rib near the costochondral junction.  That is slightly decreased FDG avidity in the right femoral neck lesion.  Otherwise bone disease is a stable.  No other evidence of metastatic disease is found.    CA 27-29 on 6/30/2020 was 308.    We decided on continuing the same chemotherapy because overall clinical picture was consistent with stable to only minimally progressive disease and she was tolerating treatments well with decent quality of life.    Cycle #10 was delayed by 1 week because ANC was 0.9.        After 13 cycles of eribulin, PET scan on 11/6/2020 shows progression of the disease with increased FDG uptake in the lytic lesions in the T9/T10 and right posterolateral elements as well as increased FDG uptake in the previously known hypermetabolic right iliac bone lesion suggesting recurrence of previously treated metastasis.  There is new subtle lesion in the right manubrium.  There is also a new fracture in the anterolateral left fourth rib with associated uptake and this could be a pathologic fracture.  Healing fracture in the left anterior fifth rib lesion.  There is resolution of the left pulmonary emboli.  Decreased FDG uptake of the esophagus consistent with improving inflammation.    She is doing pretty well and her performance status is ECOG 1.    Because of progression of the disease, I recommend switching to recently FDA approved sacituzumab govitecan-hziy (Trodelvy) for TNBC, based on the results of the phase II IMMU-132-01 clinical trial.   We discussed the rational schedule and potential  side effects of it in detail.    We gave it to her on 11/11/2020.    She had significant nausea and vomiting with it but otherwise tolerated it well.   She started cycle #2 on 12/2/2020 and received day 8 on 12/8/2020.  She then got admitted for nausea vomiting dehydration and weakness.     She is doing a little better today.  She has had a very rough time with this chemotherapy so we discussed that we will defer the chemotherapy today especially because she also has neutropenia and going forward I will decrease the dose of chemotherapy by 25%.       Nausea and vomiting.  Continue Emend Decadron and Zofran.  Continue Compazine as needed.  She also has sublingual Zofran if need be.  She does get headaches with Zofran but she can try if the nausea is not under control even after reducing the dose of chemotherapy and even after taking Compazine.      Diarrhea.  Use Imodium as needed.    Bone disease.  She has metastatic disease to the bone.  Previously she got palliative radiation to T12-L5 3000 cGy in 10 fractions from 9/6/2019 till 9/18/2019.  She was evaluated by orthopedics Dr. Bipin Elliott on 11/1/2019 and conservative management was recommended.    We are giving her Zometa every 3 months. She last received on 9/29/2020.  Because of progression of the disease in the bones, I recommended switching to Xgeva which she received on 11/11/2020.  She will get Xgeva today 12/22/2020. Cont D and Ca.     Leukopenia/Neutropenia.  ANC is 1.0.  No infections.  Hold chemotherapy today and delayed by 1 week.  Going forward reduce the dose of chemotherapy to 75%.    Anemia.  She has mild anemia from chemotherapy.  Continue to observe.    Pain management.  She somehow has not been taking MS Contin as she ran out.  I refilled it today.  Continue morphine immediate release as needed and follow-up with palliative care.        Subcutaneous nodule in the scalp.  I am not certain whether it is a cyst or a metastatic deposit.   Currently it is not bothering her.  For now she tells me that it is stable in size.  Continue to observe.     Bilateral pulmonary emboli.  Continue Eliquis for incidentally found PE on 7/17/2020.       Neuropathy.  She has mild neuropathy.  Up until now it has not worsened on Trodelvy.  Continue to observe.  She has chronic baseline balance issues and heat and cold sensitivity from underlying multiple sclerosis.        We did not address the following today.    Sleeping problems.  Continue trazodone.    Vision changes.    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  It was thought that this could be due to Taxol as patient reported to the ophthalmologist that she was on Taxol in September 2019 when her symptoms started.  But she was not on Taxol in September 2019 when she started noticing the visual changes so Taxol is not responsible for this.  The first dose of Taxol was on 11/5/2019.   Her vision is much better now.  She will continue to follow closely with ophthalmology.       Increased FDG ability in the colon.  She had FDG uptake in the cecum and ascending colon but no corresponding lesion was seen on the CT scan.  She is completely asymptomatic so at this time we will continue to observe.    Discussion regarding healthcare directives.  On previous visit she told me that she will bring the health care directive for our records but she forgot so I told her to bring it on next visit.      Breast implant removal.  She tells me that she was planning to do breast implant removal because there was a recall on this.  Her surgery was scheduled for 9/24/2019.  Because of this new development of metastatic breast cancer and her recent radiation, surgery has been canceled.  We discussed that at this time treatment for metastatic breast cancer would take precedence over the other surgery as the other surgery would require her to be off chemotherapy for several weeks and in that case the  chances of cancer progression would be high and I would not recommend that.    Multiple sclerosis.  She will continue to follow with her neurologist Dr. Quiles.  Currently multiple sclerosis is under control and currently she is not taking any medications.    Return to clinic next week for delayed cycle number 3-day #1 chemotherapy.  See me back 1/19/2020 before cycle #4.     All of her and her 's questions were answered to their satisfaction.  They are agreeable and comfortable with the plan.      Dewayne Zepeda MD    Video start time. 3:07 PM  Video stop time. 3:27 PM              Again, thank you for allowing me to participate in the care of your patient.        Sincerely,        Dewayne Zepeda MD

## 2020-12-22 NOTE — PROGRESS NOTES
Infusion Nursing Note:  Shaneka Alvarez presents today for Xgeva injection - Chemo Held - return in 1 week..    Patient seen by provider today: Yes: Dr Zepeda   present during visit today: Not Applicable.    Note: Per Dr Zepeda - No chemo today, just Xgeva. Pt instructed on Neutropenic precautions, verbalized understanding.    Intravenous Access:  Implanted Port.    Treatment Conditions:  Lab Results   Component Value Date    HGB 11.1 12/22/2020     Lab Results   Component Value Date    WBC 2.4 12/22/2020      Lab Results   Component Value Date    ANEU 1.0 12/22/2020     Lab Results   Component Value Date     12/22/2020      Lab Results   Component Value Date     12/22/2020                   Lab Results   Component Value Date    POTASSIUM 4.0 12/22/2020           Lab Results   Component Value Date    MAG 2.2 12/22/2020            Lab Results   Component Value Date    CR 0.56 12/22/2020                   Lab Results   Component Value Date    RAFAELA 8.5 12/22/2020                Lab Results   Component Value Date    BILITOTAL 0.3 12/22/2020           Lab Results   Component Value Date    ALBUMIN 3.2 12/22/2020                    Lab Results   Component Value Date    ALT 35 12/22/2020           Lab Results   Component Value Date    AST 20 12/22/2020       Results reviewed, labs MET treatment parameters, ok to proceed with treatment Calcium 8.5  Results reviewed, labs did NOT meet treatment parameters: ANC 1.0 .      Post Infusion Assessment:  Patient tolerated injection without incident.  Site patent and intact, free from redness, edema or discomfort.  No evidence of extravasations.  Access discontinued per protocol.       Discharge Plan:   Return in 1 week - 12/29/2020  Discharge instructions reviewed with: Patient.  Patient and/or family verbalized understanding of discharge instructions and all questions answered.  Patient discharged in stable condition accompanied by: self.  Departure Mode:  Ambulatory.    Loren Costa, RN

## 2020-12-29 ENCOUNTER — INFUSION THERAPY VISIT (OUTPATIENT)
Dept: INFUSION THERAPY | Facility: CLINIC | Age: 60
End: 2020-12-29
Payer: COMMERCIAL

## 2020-12-29 ENCOUNTER — OFFICE VISIT (OUTPATIENT)
Dept: ONCOLOGY | Facility: CLINIC | Age: 60
End: 2020-12-29
Payer: COMMERCIAL

## 2020-12-29 VITALS
DIASTOLIC BLOOD PRESSURE: 79 MMHG | WEIGHT: 130.5 LBS | RESPIRATION RATE: 18 BRPM | OXYGEN SATURATION: 99 % | BODY MASS INDEX: 23.87 KG/M2 | SYSTOLIC BLOOD PRESSURE: 115 MMHG | TEMPERATURE: 98.3 F | HEART RATE: 55 BPM

## 2020-12-29 DIAGNOSIS — C50.911 BILATERAL MALIGNANT NEOPLASM OF BREAST IN FEMALE, UNSPECIFIED ESTROGEN RECEPTOR STATUS, UNSPECIFIED SITE OF BREAST (H): ICD-10-CM

## 2020-12-29 DIAGNOSIS — C50.912 BILATERAL MALIGNANT NEOPLASM OF BREAST IN FEMALE, UNSPECIFIED ESTROGEN RECEPTOR STATUS, UNSPECIFIED SITE OF BREAST (H): ICD-10-CM

## 2020-12-29 DIAGNOSIS — D70.1 CHEMOTHERAPY-INDUCED NEUTROPENIA (H): Primary | ICD-10-CM

## 2020-12-29 DIAGNOSIS — C79.51 BONE METASTASES: Primary | ICD-10-CM

## 2020-12-29 DIAGNOSIS — D70.1 CHEMOTHERAPY-INDUCED NEUTROPENIA (H): ICD-10-CM

## 2020-12-29 DIAGNOSIS — C50.919 METASTATIC BREAST CANCER: ICD-10-CM

## 2020-12-29 DIAGNOSIS — T45.1X5A CHEMOTHERAPY-INDUCED NEUTROPENIA (H): Primary | ICD-10-CM

## 2020-12-29 DIAGNOSIS — E87.6 HYPOKALEMIA: ICD-10-CM

## 2020-12-29 DIAGNOSIS — T45.1X5A CHEMOTHERAPY-INDUCED NEUTROPENIA (H): ICD-10-CM

## 2020-12-29 LAB
ALBUMIN SERPL-MCNC: 3.4 G/DL (ref 3.4–5)
ALP SERPL-CCNC: 89 U/L (ref 40–150)
ALT SERPL W P-5'-P-CCNC: 34 U/L (ref 0–50)
ANION GAP SERPL CALCULATED.3IONS-SCNC: 1 MMOL/L (ref 3–14)
AST SERPL W P-5'-P-CCNC: 21 U/L (ref 0–45)
BASOPHILS # BLD AUTO: 0.1 10E9/L (ref 0–0.2)
BASOPHILS NFR BLD AUTO: 1.4 %
BILIRUB SERPL-MCNC: 0.3 MG/DL (ref 0.2–1.3)
BUN SERPL-MCNC: 8 MG/DL (ref 7–30)
CALCIUM SERPL-MCNC: 9 MG/DL (ref 8.5–10.1)
CANCER AG27-29 SERPL-ACNC: 392 U/ML (ref 0–39)
CHLORIDE SERPL-SCNC: 105 MMOL/L (ref 94–109)
CO2 SERPL-SCNC: 31 MMOL/L (ref 20–32)
CREAT SERPL-MCNC: 0.65 MG/DL (ref 0.52–1.04)
DIFFERENTIAL METHOD BLD: ABNORMAL
EOSINOPHIL # BLD AUTO: 0.2 10E9/L (ref 0–0.7)
EOSINOPHIL NFR BLD AUTO: 4.2 %
ERYTHROCYTE [DISTWIDTH] IN BLOOD BY AUTOMATED COUNT: 15.5 % (ref 10–15)
GFR SERPL CREATININE-BSD FRML MDRD: >90 ML/MIN/{1.73_M2}
GLUCOSE SERPL-MCNC: 103 MG/DL (ref 70–99)
HCT VFR BLD AUTO: 36.6 % (ref 35–47)
HGB BLD-MCNC: 11.7 G/DL (ref 11.7–15.7)
IMM GRANULOCYTES # BLD: 0.1 10E9/L (ref 0–0.4)
IMM GRANULOCYTES NFR BLD: 2.8 %
LYMPHOCYTES # BLD AUTO: 0.5 10E9/L (ref 0.8–5.3)
LYMPHOCYTES NFR BLD AUTO: 11.9 %
MAGNESIUM SERPL-MCNC: 2.2 MG/DL (ref 1.6–2.3)
MCH RBC QN AUTO: 27.7 PG (ref 26.5–33)
MCHC RBC AUTO-ENTMCNC: 32 G/DL (ref 31.5–36.5)
MCV RBC AUTO: 87 FL (ref 78–100)
MONOCYTES # BLD AUTO: 0.8 10E9/L (ref 0–1.3)
MONOCYTES NFR BLD AUTO: 18 %
NEUTROPHILS # BLD AUTO: 2.6 10E9/L (ref 1.6–8.3)
NEUTROPHILS NFR BLD AUTO: 61.7 %
PHOSPHATE SERPL-MCNC: 3.8 MG/DL (ref 2.5–4.5)
PLATELET # BLD AUTO: 287 10E9/L (ref 150–450)
POTASSIUM SERPL-SCNC: 4.6 MMOL/L (ref 3.4–5.3)
PROT SERPL-MCNC: 6.5 G/DL (ref 6.8–8.8)
RBC # BLD AUTO: 4.23 10E12/L (ref 3.8–5.2)
SODIUM SERPL-SCNC: 137 MMOL/L (ref 133–144)
WBC # BLD AUTO: 4.3 10E9/L (ref 4–11)

## 2020-12-29 PROCEDURE — 83735 ASSAY OF MAGNESIUM: CPT | Performed by: INTERNAL MEDICINE

## 2020-12-29 PROCEDURE — 86300 IMMUNOASSAY TUMOR CA 15-3: CPT | Performed by: INTERNAL MEDICINE

## 2020-12-29 PROCEDURE — 84100 ASSAY OF PHOSPHORUS: CPT | Performed by: INTERNAL MEDICINE

## 2020-12-29 PROCEDURE — 96367 TX/PROPH/DG ADDL SEQ IV INF: CPT | Performed by: INTERNAL MEDICINE

## 2020-12-29 PROCEDURE — 85025 COMPLETE CBC W/AUTO DIFF WBC: CPT | Performed by: INTERNAL MEDICINE

## 2020-12-29 PROCEDURE — 96375 TX/PRO/DX INJ NEW DRUG ADDON: CPT | Performed by: INTERNAL MEDICINE

## 2020-12-29 PROCEDURE — 80053 COMPREHEN METABOLIC PANEL: CPT | Performed by: INTERNAL MEDICINE

## 2020-12-29 PROCEDURE — 99207 PR NO CHARGE LOS: CPT

## 2020-12-29 PROCEDURE — 96413 CHEMO IV INFUSION 1 HR: CPT | Performed by: INTERNAL MEDICINE

## 2020-12-29 PROCEDURE — S0028 INJECTION, FAMOTIDINE, 20 MG: HCPCS | Performed by: INTERNAL MEDICINE

## 2020-12-29 RX ORDER — SODIUM CHLORIDE 9 MG/ML
1000 INJECTION, SOLUTION INTRAVENOUS CONTINUOUS PRN
Status: CANCELLED
Start: 2020-12-29

## 2020-12-29 RX ORDER — DIPHENHYDRAMINE HCL 25 MG
50 CAPSULE ORAL ONCE
Status: COMPLETED | OUTPATIENT
Start: 2020-12-29 | End: 2020-12-29

## 2020-12-29 RX ORDER — ATROPINE SULFATE 0.4 MG/ML
0.4 AMPUL (ML) INJECTION
Status: CANCELLED | OUTPATIENT
Start: 2020-12-29

## 2020-12-29 RX ORDER — HEPARIN SODIUM (PORCINE) LOCK FLUSH IV SOLN 100 UNIT/ML 100 UNIT/ML
5 SOLUTION INTRAVENOUS
Status: CANCELLED | OUTPATIENT
Start: 2020-12-29

## 2020-12-29 RX ORDER — ALBUTEROL SULFATE 0.83 MG/ML
2.5 SOLUTION RESPIRATORY (INHALATION)
Status: CANCELLED | OUTPATIENT
Start: 2020-12-29

## 2020-12-29 RX ORDER — ALBUTEROL SULFATE 90 UG/1
1-2 AEROSOL, METERED RESPIRATORY (INHALATION)
Status: CANCELLED
Start: 2020-12-29

## 2020-12-29 RX ORDER — HEPARIN SODIUM (PORCINE) LOCK FLUSH IV SOLN 100 UNIT/ML 100 UNIT/ML
5 SOLUTION INTRAVENOUS EVERY 8 HOURS
Status: DISCONTINUED | OUTPATIENT
Start: 2020-12-29 | End: 2020-12-29 | Stop reason: HOSPADM

## 2020-12-29 RX ORDER — METHYLPREDNISOLONE SODIUM SUCCINATE 125 MG/2ML
125 INJECTION, POWDER, LYOPHILIZED, FOR SOLUTION INTRAMUSCULAR; INTRAVENOUS
Status: CANCELLED
Start: 2020-12-29

## 2020-12-29 RX ORDER — LORAZEPAM 2 MG/ML
0.5 INJECTION INTRAMUSCULAR EVERY 4 HOURS PRN
Status: CANCELLED
Start: 2020-12-29

## 2020-12-29 RX ORDER — NALOXONE HYDROCHLORIDE 0.4 MG/ML
.1-.4 INJECTION, SOLUTION INTRAMUSCULAR; INTRAVENOUS; SUBCUTANEOUS
Status: CANCELLED | OUTPATIENT
Start: 2020-12-29

## 2020-12-29 RX ORDER — ACETAMINOPHEN 325 MG/1
650 TABLET ORAL ONCE
Status: CANCELLED | OUTPATIENT
Start: 2020-12-29

## 2020-12-29 RX ORDER — DIPHENHYDRAMINE HCL 25 MG
50 CAPSULE ORAL ONCE
Status: CANCELLED | OUTPATIENT
Start: 2020-12-29

## 2020-12-29 RX ORDER — MEPERIDINE HYDROCHLORIDE 25 MG/ML
25 INJECTION INTRAMUSCULAR; INTRAVENOUS; SUBCUTANEOUS EVERY 30 MIN PRN
Status: CANCELLED | OUTPATIENT
Start: 2020-12-29

## 2020-12-29 RX ORDER — HEPARIN SODIUM,PORCINE 10 UNIT/ML
5 VIAL (ML) INTRAVENOUS
Status: CANCELLED | OUTPATIENT
Start: 2020-12-29

## 2020-12-29 RX ORDER — EPINEPHRINE 1 MG/ML
0.3 INJECTION, SOLUTION INTRAMUSCULAR; SUBCUTANEOUS EVERY 5 MIN PRN
Status: CANCELLED | OUTPATIENT
Start: 2020-12-29

## 2020-12-29 RX ORDER — HEPARIN SODIUM (PORCINE) LOCK FLUSH IV SOLN 100 UNIT/ML 100 UNIT/ML
5 SOLUTION INTRAVENOUS
Status: DISCONTINUED | OUTPATIENT
Start: 2020-12-29 | End: 2020-12-30 | Stop reason: HOSPADM

## 2020-12-29 RX ORDER — DIPHENHYDRAMINE HYDROCHLORIDE 50 MG/ML
50 INJECTION INTRAMUSCULAR; INTRAVENOUS
Status: CANCELLED
Start: 2020-12-29

## 2020-12-29 RX ORDER — ACETAMINOPHEN 325 MG/1
650 TABLET ORAL ONCE
Status: COMPLETED | OUTPATIENT
Start: 2020-12-29 | End: 2020-12-29

## 2020-12-29 RX ADMIN — Medication 50 MG: at 13:57

## 2020-12-29 RX ADMIN — ACETAMINOPHEN 650 MG: 325 TABLET ORAL at 13:57

## 2020-12-29 RX ADMIN — HEPARIN SODIUM (PORCINE) LOCK FLUSH IV SOLN 100 UNIT/ML 5 ML: 100 SOLUTION at 13:05

## 2020-12-29 RX ADMIN — Medication 500 ML: at 14:00

## 2020-12-29 RX ADMIN — HEPARIN SODIUM (PORCINE) LOCK FLUSH IV SOLN 100 UNIT/ML 5 ML: 100 SOLUTION at 16:43

## 2020-12-29 ASSESSMENT — PAIN SCALES - GENERAL: PAINLEVEL: MILD PAIN (3)

## 2020-12-29 NOTE — PROGRESS NOTES
Port needle left for access for treatment, Sterile technique performed and maintained. Patient tolerated procedure well. Tubes drawn in rainbow order: red, green, purple. Transparent dressing placed with use of tegaderm.    Iveth Durham RN  [facility]

## 2020-12-29 NOTE — PROGRESS NOTES
Infusion Nursing Note:  Shaneka RICKY Alvarez presents today for: delayed C3D1 Trodelvy.    Patient seen by provider today: No   present during visit today: Not Applicable.    Note: patient has felt well in terms of side effects.  Her appetite is less, but she does supplement with boost or ensure    Intravenous Access:  Implanted Port.    Treatment Conditions:  Results reviewed, labs MET treatment parameters, ok to proceed with treatment.      Post Infusion Assessment:  Patient tolerated infusion without incident.       Discharge Plan:   RTC 1/5 as scheduled.    PROSPER MCCRACKEN RN

## 2021-01-01 NOTE — NURSING NOTE
"Oncology Rooming Note    September 18, 2019 3:46 PM   Shaneka Alvarez is a 59 year old female who presents for:    Chief Complaint   Patient presents with     Oncology Clinic Visit     Follow Up     Initial Vitals: BP (!) 135/91 (BP Location: Left arm)   Pulse 73   Temp 97.6  F (36.4  C) (Oral)   Resp 16   Ht 1.575 m (5' 2.01\")   Wt 72.1 kg (159 lb)   SpO2 97%   BMI 29.07 kg/m   Estimated body mass index is 29.07 kg/m  as calculated from the following:    Height as of this encounter: 1.575 m (5' 2.01\").    Weight as of this encounter: 72.1 kg (159 lb). Body surface area is 1.78 meters squared.  Moderate Pain (4) Comment: Data Unavailable   No LMP recorded. Patient is postmenopausal.  Allergies reviewed: Yes  Medications reviewed: Yes    Medications: Medication refills not needed today.  Pharmacy name entered into [facility]:    [id number] [place], [place] - [address] [id number] - [place] [address] [facility] [place] [place] [address] [facility] [address]      Delfina Vasquez LPN              "
Acceptable shape position of pinnae/No pits or tags/External auditory canal size and shape acceptable

## 2021-01-04 DIAGNOSIS — T45.1X5A CHEMOTHERAPY-INDUCED NEUTROPENIA (H): Primary | ICD-10-CM

## 2021-01-04 DIAGNOSIS — C50.919 METASTATIC BREAST CANCER: ICD-10-CM

## 2021-01-04 DIAGNOSIS — D70.1 CHEMOTHERAPY-INDUCED NEUTROPENIA (H): Primary | ICD-10-CM

## 2021-01-04 DIAGNOSIS — C50.912 BILATERAL MALIGNANT NEOPLASM OF BREAST IN FEMALE, UNSPECIFIED ESTROGEN RECEPTOR STATUS, UNSPECIFIED SITE OF BREAST (H): ICD-10-CM

## 2021-01-04 DIAGNOSIS — C50.911 BILATERAL MALIGNANT NEOPLASM OF BREAST IN FEMALE, UNSPECIFIED ESTROGEN RECEPTOR STATUS, UNSPECIFIED SITE OF BREAST (H): ICD-10-CM

## 2021-01-04 DIAGNOSIS — E87.6 HYPOKALEMIA: ICD-10-CM

## 2021-01-04 RX ORDER — ATROPINE SULFATE 0.4 MG/ML
0.4 AMPUL (ML) INJECTION
Status: CANCELLED | OUTPATIENT
Start: 2021-01-05

## 2021-01-04 RX ORDER — SODIUM CHLORIDE 9 MG/ML
1000 INJECTION, SOLUTION INTRAVENOUS CONTINUOUS PRN
Status: CANCELLED
Start: 2021-01-05

## 2021-01-04 RX ORDER — ACETAMINOPHEN 325 MG/1
650 TABLET ORAL ONCE
Status: CANCELLED | OUTPATIENT
Start: 2021-01-05

## 2021-01-04 RX ORDER — ALBUTEROL SULFATE 0.83 MG/ML
2.5 SOLUTION RESPIRATORY (INHALATION)
Status: CANCELLED | OUTPATIENT
Start: 2021-01-05

## 2021-01-04 RX ORDER — ALBUTEROL SULFATE 90 UG/1
1-2 AEROSOL, METERED RESPIRATORY (INHALATION)
Status: CANCELLED
Start: 2021-01-05

## 2021-01-04 RX ORDER — LORAZEPAM 2 MG/ML
0.5 INJECTION INTRAMUSCULAR EVERY 4 HOURS PRN
Status: CANCELLED
Start: 2021-01-05

## 2021-01-04 RX ORDER — HEPARIN SODIUM (PORCINE) LOCK FLUSH IV SOLN 100 UNIT/ML 100 UNIT/ML
5 SOLUTION INTRAVENOUS
Status: CANCELLED | OUTPATIENT
Start: 2021-01-05

## 2021-01-04 RX ORDER — DIPHENHYDRAMINE HYDROCHLORIDE 50 MG/ML
50 INJECTION INTRAMUSCULAR; INTRAVENOUS
Status: CANCELLED
Start: 2021-01-05

## 2021-01-04 RX ORDER — MEPERIDINE HYDROCHLORIDE 25 MG/ML
25 INJECTION INTRAMUSCULAR; INTRAVENOUS; SUBCUTANEOUS EVERY 30 MIN PRN
Status: CANCELLED | OUTPATIENT
Start: 2021-01-05

## 2021-01-04 RX ORDER — EPINEPHRINE 1 MG/ML
0.3 INJECTION, SOLUTION INTRAMUSCULAR; SUBCUTANEOUS EVERY 5 MIN PRN
Status: CANCELLED | OUTPATIENT
Start: 2021-01-05

## 2021-01-04 RX ORDER — NALOXONE HYDROCHLORIDE 0.4 MG/ML
.1-.4 INJECTION, SOLUTION INTRAMUSCULAR; INTRAVENOUS; SUBCUTANEOUS
Status: CANCELLED | OUTPATIENT
Start: 2021-01-05

## 2021-01-04 RX ORDER — METHYLPREDNISOLONE SODIUM SUCCINATE 125 MG/2ML
125 INJECTION, POWDER, LYOPHILIZED, FOR SOLUTION INTRAMUSCULAR; INTRAVENOUS
Status: CANCELLED
Start: 2021-01-05

## 2021-01-04 RX ORDER — HEPARIN SODIUM,PORCINE 10 UNIT/ML
5 VIAL (ML) INTRAVENOUS
Status: CANCELLED | OUTPATIENT
Start: 2021-01-05

## 2021-01-04 RX ORDER — DIPHENHYDRAMINE HCL 25 MG
50 CAPSULE ORAL ONCE
Status: CANCELLED | OUTPATIENT
Start: 2021-01-05

## 2021-01-05 ENCOUNTER — INFUSION THERAPY VISIT (OUTPATIENT)
Dept: INFUSION THERAPY | Facility: CLINIC | Age: 61
End: 2021-01-05
Payer: COMMERCIAL

## 2021-01-05 ENCOUNTER — OFFICE VISIT (OUTPATIENT)
Dept: ONCOLOGY | Facility: CLINIC | Age: 61
End: 2021-01-05
Payer: COMMERCIAL

## 2021-01-05 VITALS
TEMPERATURE: 98.3 F | BODY MASS INDEX: 23.67 KG/M2 | OXYGEN SATURATION: 100 % | DIASTOLIC BLOOD PRESSURE: 79 MMHG | SYSTOLIC BLOOD PRESSURE: 118 MMHG | HEART RATE: 108 BPM | RESPIRATION RATE: 18 BRPM | WEIGHT: 129.4 LBS

## 2021-01-05 DIAGNOSIS — E87.6 HYPOKALEMIA: ICD-10-CM

## 2021-01-05 DIAGNOSIS — C50.912 BILATERAL MALIGNANT NEOPLASM OF BREAST IN FEMALE, UNSPECIFIED ESTROGEN RECEPTOR STATUS, UNSPECIFIED SITE OF BREAST (H): ICD-10-CM

## 2021-01-05 DIAGNOSIS — C50.919 METASTATIC BREAST CANCER: ICD-10-CM

## 2021-01-05 DIAGNOSIS — D70.1 CHEMOTHERAPY-INDUCED NEUTROPENIA (H): Primary | ICD-10-CM

## 2021-01-05 DIAGNOSIS — C50.911 BILATERAL MALIGNANT NEOPLASM OF BREAST IN FEMALE, UNSPECIFIED ESTROGEN RECEPTOR STATUS, UNSPECIFIED SITE OF BREAST (H): ICD-10-CM

## 2021-01-05 DIAGNOSIS — T45.1X5A CHEMOTHERAPY-INDUCED NEUTROPENIA (H): ICD-10-CM

## 2021-01-05 DIAGNOSIS — D70.1 CHEMOTHERAPY-INDUCED NEUTROPENIA (H): ICD-10-CM

## 2021-01-05 DIAGNOSIS — E87.6 HYPOKALEMIA: Primary | ICD-10-CM

## 2021-01-05 DIAGNOSIS — T45.1X5A CHEMOTHERAPY-INDUCED NEUTROPENIA (H): Primary | ICD-10-CM

## 2021-01-05 LAB
ALBUMIN SERPL-MCNC: 3.8 G/DL (ref 3.4–5)
ALP SERPL-CCNC: 81 U/L (ref 40–150)
ALT SERPL W P-5'-P-CCNC: 36 U/L (ref 0–50)
ANION GAP SERPL CALCULATED.3IONS-SCNC: 4 MMOL/L (ref 3–14)
AST SERPL W P-5'-P-CCNC: 22 U/L (ref 0–45)
BASOPHILS # BLD AUTO: 0.1 10E9/L (ref 0–0.2)
BASOPHILS NFR BLD AUTO: 0.9 %
BILIRUB SERPL-MCNC: 0.3 MG/DL (ref 0.2–1.3)
BUN SERPL-MCNC: 16 MG/DL (ref 7–30)
CALCIUM SERPL-MCNC: 8.8 MG/DL (ref 8.5–10.1)
CHLORIDE SERPL-SCNC: 103 MMOL/L (ref 94–109)
CO2 SERPL-SCNC: 28 MMOL/L (ref 20–32)
CREAT SERPL-MCNC: 0.71 MG/DL (ref 0.52–1.04)
DIFFERENTIAL METHOD BLD: ABNORMAL
EOSINOPHIL # BLD AUTO: 0.1 10E9/L (ref 0–0.7)
EOSINOPHIL NFR BLD AUTO: 2.4 %
ERYTHROCYTE [DISTWIDTH] IN BLOOD BY AUTOMATED COUNT: 15.3 % (ref 10–15)
GFR SERPL CREATININE-BSD FRML MDRD: >90 ML/MIN/{1.73_M2}
GLUCOSE SERPL-MCNC: 117 MG/DL (ref 70–99)
HCT VFR BLD AUTO: 39.7 % (ref 35–47)
HGB BLD-MCNC: 12.9 G/DL (ref 11.7–15.7)
IMM GRANULOCYTES # BLD: 0.1 10E9/L (ref 0–0.4)
IMM GRANULOCYTES NFR BLD: 1.7 %
LYMPHOCYTES # BLD AUTO: 0.6 10E9/L (ref 0.8–5.3)
LYMPHOCYTES NFR BLD AUTO: 10.8 %
MAGNESIUM SERPL-MCNC: 2.1 MG/DL (ref 1.6–2.3)
MCH RBC QN AUTO: 27.8 PG (ref 26.5–33)
MCHC RBC AUTO-ENTMCNC: 32.5 G/DL (ref 31.5–36.5)
MCV RBC AUTO: 86 FL (ref 78–100)
MONOCYTES # BLD AUTO: 0.6 10E9/L (ref 0–1.3)
MONOCYTES NFR BLD AUTO: 10.2 %
NEUTROPHILS # BLD AUTO: 4 10E9/L (ref 1.6–8.3)
NEUTROPHILS NFR BLD AUTO: 74 %
PHOSPHATE SERPL-MCNC: 3.5 MG/DL (ref 2.5–4.5)
PLATELET # BLD AUTO: 216 10E9/L (ref 150–450)
POTASSIUM SERPL-SCNC: 3.7 MMOL/L (ref 3.4–5.3)
PROT SERPL-MCNC: 7.1 G/DL (ref 6.8–8.8)
RBC # BLD AUTO: 4.64 10E12/L (ref 3.8–5.2)
SODIUM SERPL-SCNC: 135 MMOL/L (ref 133–144)
WBC # BLD AUTO: 5.4 10E9/L (ref 4–11)

## 2021-01-05 PROCEDURE — 85025 COMPLETE CBC W/AUTO DIFF WBC: CPT | Performed by: INTERNAL MEDICINE

## 2021-01-05 PROCEDURE — 84100 ASSAY OF PHOSPHORUS: CPT | Performed by: INTERNAL MEDICINE

## 2021-01-05 PROCEDURE — 96413 CHEMO IV INFUSION 1 HR: CPT | Performed by: NURSE PRACTITIONER

## 2021-01-05 PROCEDURE — 80053 COMPREHEN METABOLIC PANEL: CPT | Performed by: INTERNAL MEDICINE

## 2021-01-05 PROCEDURE — 99207 PR NO CHARGE LOS: CPT

## 2021-01-05 PROCEDURE — 96367 TX/PROPH/DG ADDL SEQ IV INF: CPT | Performed by: NURSE PRACTITIONER

## 2021-01-05 PROCEDURE — 96375 TX/PRO/DX INJ NEW DRUG ADDON: CPT | Performed by: NURSE PRACTITIONER

## 2021-01-05 PROCEDURE — 83735 ASSAY OF MAGNESIUM: CPT | Performed by: INTERNAL MEDICINE

## 2021-01-05 PROCEDURE — S0028 INJECTION, FAMOTIDINE, 20 MG: HCPCS | Performed by: NURSE PRACTITIONER

## 2021-01-05 RX ORDER — ACETAMINOPHEN 325 MG/1
650 TABLET ORAL ONCE
Status: COMPLETED | OUTPATIENT
Start: 2021-01-05 | End: 2021-01-05

## 2021-01-05 RX ORDER — HEPARIN SODIUM (PORCINE) LOCK FLUSH IV SOLN 100 UNIT/ML 100 UNIT/ML
5 SOLUTION INTRAVENOUS
Status: DISCONTINUED | OUTPATIENT
Start: 2021-01-05 | End: 2021-01-05 | Stop reason: HOSPADM

## 2021-01-05 RX ORDER — DIPHENHYDRAMINE HCL 25 MG
50 CAPSULE ORAL ONCE
Status: COMPLETED | OUTPATIENT
Start: 2021-01-05 | End: 2021-01-05

## 2021-01-05 RX ORDER — HEPARIN SODIUM (PORCINE) LOCK FLUSH IV SOLN 100 UNIT/ML 100 UNIT/ML
5 SOLUTION INTRAVENOUS EVERY 8 HOURS
Status: DISCONTINUED | OUTPATIENT
Start: 2021-01-05 | End: 2021-01-05 | Stop reason: HOSPADM

## 2021-01-05 RX ADMIN — ACETAMINOPHEN 650 MG: 325 TABLET ORAL at 16:03

## 2021-01-05 RX ADMIN — HEPARIN SODIUM (PORCINE) LOCK FLUSH IV SOLN 100 UNIT/ML 5 ML: 100 SOLUTION at 14:32

## 2021-01-05 RX ADMIN — HEPARIN SODIUM (PORCINE) LOCK FLUSH IV SOLN 100 UNIT/ML 5 ML: 100 SOLUTION at 17:58

## 2021-01-05 RX ADMIN — Medication 500 ML: at 15:35

## 2021-01-05 RX ADMIN — Medication 50 MG: at 16:03

## 2021-01-05 ASSESSMENT — PAIN SCALES - GENERAL: PAINLEVEL: MODERATE PAIN (4)

## 2021-01-05 NOTE — PROGRESS NOTES
Infusion Nursing Note:  Shaneka Alvarez presents today for: Karen.    Patient seen by provider today: No   present during visit today: Not Applicable.    Note: patient tolerating regimen well.    Intravenous Access:  Implanted Port.    Treatment Conditions:  Results reviewed, labs MET treatment parameters, ok to proceed with treatment.      Post Infusion Assessment:  Patient tolerated infusion without incident.       Discharge Plan:   RTC as scheduled.  Reviewed upcoming appts with patient.    PROSPER MCCRACKEN RN

## 2021-01-05 NOTE — PROGRESS NOTES
Port needle left for access for treatment, Sterile technique performed and maintained. Patient tolerated procedure well. Tubes drawn in rainbow order: red, green, purple. Transparent dressing placed with use of tegaderm.    Iveth Durham RN  [facility]fusion [place]

## 2021-01-06 DIAGNOSIS — G47.00 INSOMNIA, UNSPECIFIED TYPE: ICD-10-CM

## 2021-01-06 DIAGNOSIS — I26.99 ACUTE PULMONARY EMBOLISM WITHOUT ACUTE COR PULMONALE, UNSPECIFIED PULMONARY EMBOLISM TYPE (H): ICD-10-CM

## 2021-01-06 RX ORDER — TRAZODONE HYDROCHLORIDE 50 MG/1
50 TABLET, FILM COATED ORAL AT BEDTIME
Qty: 30 TABLET | Refills: 1 | Status: SHIPPED | OUTPATIENT
Start: 2021-01-06 | End: 2021-03-02

## 2021-01-06 NOTE — TELEPHONE ENCOUNTER
SUBJECTIVE/OBJECTIVE:                                                    Refill request receive for:  Eliquis    Last refill per fax: 12/9/2020  Date of LOV r/t Medication: 12/22/2020 Dr. Zepeda  Future appt scheduled? Yes: 1/19/2021 Dr. Zepeda   Date faxed to clinic: 1/6/2021    RECENT LABS/VITALS                                                      Recent Labs   Lab Test 12/29/16  0946   CHOL 291*   HDL 52   *   TRIG 172*     Lab Results   Component Value Date    AST 22 01/05/2021     Lab Results   Component Value Date    ALT 36 01/05/2021     Lab Results   Component Value Date    A1C 5.4 12/29/2016     Creatinine   Date Value Ref Range Status   01/05/2021 0.71 0.52 - 1.04 mg/dL Final   ]  Potassium   Date Value Ref Range Status   01/05/2021 3.7 3.4 - 5.3 mmol/L Final   ]  TSH   Date Value Ref Range Status   12/08/2014 1.11 mcU/mL Final     BP Readings from Last 4 Encounters:   01/05/21 118/79   12/29/20 115/79   12/22/20 114/78   12/16/20 (!) 151/92       PLAN:                                                      Sent to provider to advise.

## 2021-01-08 ENCOUNTER — TELEPHONE (OUTPATIENT)
Dept: ONCOLOGY | Facility: CLINIC | Age: 61
End: 2021-01-08
Payer: MEDICARE

## 2021-01-08 NOTE — TELEPHONE ENCOUNTER
Writer spoke with the patient. She is requesting a new referral to a Retina Specialist within [facility].    The Provider she currently sees is leaving and she would like to see a new Specialist in the near future as her vision is worsening.    Please call the patient at home with any questions.     Thank you.    Omayra Enrique  [facility]  Cancer Center   [phone]

## 2021-01-12 DIAGNOSIS — H35.353 CYSTOID MACULAR EDEMA OF BOTH EYES: Primary | ICD-10-CM

## 2021-01-19 ENCOUNTER — VIRTUAL VISIT (OUTPATIENT)
Dept: ONCOLOGY | Facility: CLINIC | Age: 61
End: 2021-01-19
Payer: COMMERCIAL

## 2021-01-19 VITALS — BODY MASS INDEX: 23.59 KG/M2 | WEIGHT: 129 LBS

## 2021-01-19 DIAGNOSIS — T45.1X5A CHEMOTHERAPY-INDUCED NEUTROPENIA (H): Primary | ICD-10-CM

## 2021-01-19 DIAGNOSIS — C50.912 BILATERAL MALIGNANT NEOPLASM OF BREAST IN FEMALE, UNSPECIFIED ESTROGEN RECEPTOR STATUS, UNSPECIFIED SITE OF BREAST (H): ICD-10-CM

## 2021-01-19 DIAGNOSIS — D70.1 CHEMOTHERAPY-INDUCED NEUTROPENIA (H): Primary | ICD-10-CM

## 2021-01-19 DIAGNOSIS — C50.911 BILATERAL MALIGNANT NEOPLASM OF BREAST IN FEMALE, UNSPECIFIED ESTROGEN RECEPTOR STATUS, UNSPECIFIED SITE OF BREAST (H): ICD-10-CM

## 2021-01-19 DIAGNOSIS — E87.6 HYPOKALEMIA: ICD-10-CM

## 2021-01-19 DIAGNOSIS — C50.919 METASTATIC BREAST CANCER: ICD-10-CM

## 2021-01-19 PROCEDURE — 99214 OFFICE O/P EST MOD 30 MIN: CPT | Mod: GT | Performed by: INTERNAL MEDICINE

## 2021-01-19 RX ORDER — MEPERIDINE HYDROCHLORIDE 25 MG/ML
25 INJECTION INTRAMUSCULAR; INTRAVENOUS; SUBCUTANEOUS EVERY 30 MIN PRN
Status: CANCELLED | OUTPATIENT
Start: 2021-01-20

## 2021-01-19 RX ORDER — ALBUTEROL SULFATE 0.83 MG/ML
2.5 SOLUTION RESPIRATORY (INHALATION)
Status: CANCELLED | OUTPATIENT
Start: 2021-01-26

## 2021-01-19 RX ORDER — SODIUM CHLORIDE 9 MG/ML
1000 INJECTION, SOLUTION INTRAVENOUS CONTINUOUS PRN
Status: CANCELLED
Start: 2021-01-20

## 2021-01-19 RX ORDER — ATROPINE SULFATE 0.4 MG/ML
0.4 AMPUL (ML) INJECTION
Status: CANCELLED | OUTPATIENT
Start: 2021-01-20

## 2021-01-19 RX ORDER — DIPHENHYDRAMINE HYDROCHLORIDE 50 MG/ML
50 INJECTION INTRAMUSCULAR; INTRAVENOUS
Status: CANCELLED
Start: 2021-01-20

## 2021-01-19 RX ORDER — ACETAMINOPHEN 325 MG/1
650 TABLET ORAL ONCE
Status: CANCELLED | OUTPATIENT
Start: 2021-01-26

## 2021-01-19 RX ORDER — ALBUTEROL SULFATE 90 UG/1
1-2 AEROSOL, METERED RESPIRATORY (INHALATION)
Status: CANCELLED
Start: 2021-01-20

## 2021-01-19 RX ORDER — ALBUTEROL SULFATE 90 UG/1
1-2 AEROSOL, METERED RESPIRATORY (INHALATION)
Status: CANCELLED
Start: 2021-01-26

## 2021-01-19 RX ORDER — DIPHENHYDRAMINE HCL 25 MG
50 CAPSULE ORAL ONCE
Status: CANCELLED | OUTPATIENT
Start: 2021-01-26

## 2021-01-19 RX ORDER — ATROPINE SULFATE 0.4 MG/ML
0.4 AMPUL (ML) INJECTION
Status: CANCELLED | OUTPATIENT
Start: 2021-01-26

## 2021-01-19 RX ORDER — EPINEPHRINE 1 MG/ML
0.3 INJECTION, SOLUTION INTRAMUSCULAR; SUBCUTANEOUS EVERY 5 MIN PRN
Status: CANCELLED | OUTPATIENT
Start: 2021-01-20

## 2021-01-19 RX ORDER — EPINEPHRINE 1 MG/ML
0.3 INJECTION, SOLUTION INTRAMUSCULAR; SUBCUTANEOUS EVERY 5 MIN PRN
Status: CANCELLED | OUTPATIENT
Start: 2021-01-26

## 2021-01-19 RX ORDER — METHYLPREDNISOLONE SODIUM SUCCINATE 125 MG/2ML
125 INJECTION, POWDER, LYOPHILIZED, FOR SOLUTION INTRAMUSCULAR; INTRAVENOUS
Status: CANCELLED
Start: 2021-01-20

## 2021-01-19 RX ORDER — METHYLPREDNISOLONE SODIUM SUCCINATE 125 MG/2ML
125 INJECTION, POWDER, LYOPHILIZED, FOR SOLUTION INTRAMUSCULAR; INTRAVENOUS
Status: CANCELLED
Start: 2021-01-26

## 2021-01-19 RX ORDER — LORAZEPAM 2 MG/ML
0.5 INJECTION INTRAMUSCULAR EVERY 4 HOURS PRN
Status: CANCELLED
Start: 2021-01-20

## 2021-01-19 RX ORDER — NALOXONE HYDROCHLORIDE 0.4 MG/ML
.1-.4 INJECTION, SOLUTION INTRAMUSCULAR; INTRAVENOUS; SUBCUTANEOUS
Status: CANCELLED | OUTPATIENT
Start: 2021-01-20

## 2021-01-19 RX ORDER — DIPHENHYDRAMINE HCL 25 MG
50 CAPSULE ORAL ONCE
Status: CANCELLED | OUTPATIENT
Start: 2021-01-20

## 2021-01-19 RX ORDER — HEPARIN SODIUM (PORCINE) LOCK FLUSH IV SOLN 100 UNIT/ML 100 UNIT/ML
5 SOLUTION INTRAVENOUS
Status: CANCELLED | OUTPATIENT
Start: 2021-01-26

## 2021-01-19 RX ORDER — MEPERIDINE HYDROCHLORIDE 25 MG/ML
25 INJECTION INTRAMUSCULAR; INTRAVENOUS; SUBCUTANEOUS EVERY 30 MIN PRN
Status: CANCELLED | OUTPATIENT
Start: 2021-01-26

## 2021-01-19 RX ORDER — ACETAMINOPHEN 325 MG/1
650 TABLET ORAL ONCE
Status: CANCELLED | OUTPATIENT
Start: 2021-01-20

## 2021-01-19 RX ORDER — HEPARIN SODIUM,PORCINE 10 UNIT/ML
5 VIAL (ML) INTRAVENOUS
Status: CANCELLED | OUTPATIENT
Start: 2021-01-26

## 2021-01-19 RX ORDER — ALBUTEROL SULFATE 0.83 MG/ML
2.5 SOLUTION RESPIRATORY (INHALATION)
Status: CANCELLED | OUTPATIENT
Start: 2021-01-20

## 2021-01-19 RX ORDER — HEPARIN SODIUM (PORCINE) LOCK FLUSH IV SOLN 100 UNIT/ML 100 UNIT/ML
5 SOLUTION INTRAVENOUS
Status: CANCELLED | OUTPATIENT
Start: 2021-01-20

## 2021-01-19 RX ORDER — LORAZEPAM 2 MG/ML
0.5 INJECTION INTRAMUSCULAR EVERY 4 HOURS PRN
Status: CANCELLED
Start: 2021-01-26

## 2021-01-19 RX ORDER — SODIUM CHLORIDE 9 MG/ML
1000 INJECTION, SOLUTION INTRAVENOUS CONTINUOUS PRN
Status: CANCELLED
Start: 2021-01-26

## 2021-01-19 RX ORDER — NALOXONE HYDROCHLORIDE 0.4 MG/ML
.1-.4 INJECTION, SOLUTION INTRAMUSCULAR; INTRAVENOUS; SUBCUTANEOUS
Status: CANCELLED | OUTPATIENT
Start: 2021-01-26

## 2021-01-19 RX ORDER — HEPARIN SODIUM,PORCINE 10 UNIT/ML
5 VIAL (ML) INTRAVENOUS
Status: CANCELLED | OUTPATIENT
Start: 2021-01-20

## 2021-01-19 RX ORDER — DIPHENHYDRAMINE HYDROCHLORIDE 50 MG/ML
50 INJECTION INTRAMUSCULAR; INTRAVENOUS
Status: CANCELLED
Start: 2021-01-26

## 2021-01-19 ASSESSMENT — PAIN SCALES - GENERAL: PAINLEVEL: NO PAIN (0)

## 2021-01-19 NOTE — PROGRESS NOTES
ONCOLOGY FOLLOW UP NOTE: 1/19/2021        I took the history from reviewing the previous notes that she was following with Dr. Amaya.  I have copied and updated from prior notes.    ONCOLOGY History:    1.  January 2006:  Diagnosed with Stage IIB, T2 N1 M0 invasive lobular carcinoma of the right breast.  Final pathology showed a 4.5 x 3.8 x 2.5 cm, 01/14 lymph nodes positive.  Estrogen, progesterone receptor positive, HER-2 negative.     2.  Genetics.  BRCA1 and 2 mutations not detected. 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       THERAPY TO DATE:   1.  2006:  ECOG 2104 protocol of dose-dense Adriamycin, Cytoxan and Avastin x4 cycles followed by Taxol and Avastin x4 cycles.   2.  03/2007:  Completed 1 year Avastin.   3.  11/2006-01/2007:  Radiotherapy to the right breast of 5040 cGy.   4.  03/[id number]:  Aromasin.  Stopped after moving to the [facility].   5.  01/2016:  Right modified radical mastectomy with latissimus dorsi flap reconstruction and a left prophylactic mastectomy with latissimus dorsi flap reconstruction.     She recently had MRI of the brain as a follow-up for multiple sclerosis and there were multiple calvarial lesions noted which was suspicious for metastatic disease.  There was no evidence of new multiple sclerosis lesions.  She had a PET scan on 8/30/2019 which showed widespread bone metastatic lesions (calvarium, spine, sacrum, pelvis, ribs most prominent at C7, T3, L1 and L4), hypermetabolic 3 cm lesion in LLL, and mediastinal/hilar LN. CEA wnl at 1.9 and C27-29 elevated to 415 (28 on 8/23/16). A CT guided biopsy of the right iliac bone was obtained on 9/4/19, pathology consistent with metastatic adenocarcinoma (breast), ER/UT negative, HER-2 negative PDL 1 < 1%. A second biopsy was take of the LLL nodule via EBUS on 9/5/19 did not show any malignancy.    C/T/L spine MRI 8/3/19 to 9/2/19 with numerous enhancing lesions of the C, T and L spine, largest around T9 and L1. No  evidence of cord compression. Has a L1 compression fracture and impending L4.     Received radiation T12-L5 3000 cGy in 10 fractions from 9/6/2019 till 9/18/2019.  Neurosurgery recommended no surgical intervention but to wear Oran brace when out of bed and HOB >30 degrees    She started palliative Xeloda 2000/1500 on 10/1/2019 but after just a few doses she noticed nausea, red eyes blurred vision, loss of appetite.  She stopped taking it after 5 doses and was seen by nurse practitioner on 10/7/2019 when she was improving.  At that point she was restarted on Xeloda at a lower dose of 1000 mg twice a day.  As she was not able to tolerate even the lower dose with extreme nausea and vomiting and feeling extremely fatigued and blurry vision, we decided on stopping Xeloda.    We decided on repeating scans and MRI brain on 10/25/2019 showed no intracranial metastatic disease.   Multiple enhancing calvarial lesions may be increased in number and are suggestive of metastatic disease. Thinner imaging on the current study may identify the smaller lesions and may be responsible for  identified more lesions.   There is a single focus of T2 hyperintense signal within the anterior right temporal lobe may represent interval demyelination. There is no evidence for active demyelination.    PET/CT on 11/1/2019 showed favorable response to therapy and Overall FDG uptake within scattered osseous metastases is decreased since 8/30/2019, particularly within the pelvis. Increased sclerotic appearance of L1 and L4 pathologic compression deformities, likely sequela of recently completed palliative radiation therapy.    No significant FDG uptake in previously demonstrated hypermetabolic mediastinal lymph nodes. Biopsy negative on 9/5/2019.  There is also decreased FDG uptake in the left lower lobe (biopsy negative for  malignancy), now with dense consolidation containing air bronchograms suggestive of infection versus aspiration.  There is  diffuse FDG uptake within the esophagus, consistent with esophagitis.    Because of intolerance to Xeloda we decided on switching to weekly paclitaxel on 11/5/2019.    C#2 Taxol 12/4/19- started with 70mg/m2 dose reduction    C#3 Taxol 12/30/2019     PET/CT on 1/24/2020 showed progression of the disease in some of the bone lesions including increase in size and lytic lesion within the vertebral body of C4 measuring 1 cm as opposed to 0.6 cm previously and a new central soft tissue nodule with SUV max 4.1 when previously it was non-hypermetabolic.  There is also some progression noted in the right proximal femur and right iliac bone.  There is decreased metabolic uptake in the right lamina of T9 with SUV max 4.7 when previously it was 8.0.  Other lesions are stable.    CA 27-29 also had increased significantly and it was 257 on 1/28/2020.    We decided on switching to eribulin on 1/28/2020.    C#2 2/18/2020  C#2 D#8 2/25/2020    C#3 D#1 3/18/2020 -delayed by 1 week as per patient's preference because she wanted to visit her family.    She had a repeat PET/CT on 3/27/2020 which showed stable size of the bone metastatic lesions although the FDG avidity was less, consistent with treatment response.    She had MRI of the thoracic spine on 4/3/2020 and it was compared to the one which was done in August 2019 and it showed increased size of several metastatic lesions most notably at T9 but also at T5-T7.  Other lesions at T10, T11 and T12 appeared improved.    CA 27-29 was also down to 222 on 3/18/2020.    Cycle #4 eribulin ( dose reduced to 1.2 mg/m2 )-4/7/2020-   Cycle #5 Eribulin ( 1.2 mg/m2 )- 4/28/2020   Cycle #6 Eribulin ( 1.2 mg/m2 )- 5/19/2020     Cycle #7 Eribulin ( 1.2 mg/m2 )- 6/9/2020    Cycle #8 Eribulin ( 1.2 mg/m2 )- 6/30/2020     She had a repeat PET scan on 7/17/2020 which showed incidental finding of new acute bilateral pulmonary embolism in the distal left main pulmonary artery extending in the left  upper and lower lobes and in the segmental and branch arteries in the right lower lobe.    It also showed mildly increased FDG avidity in the lytic lesion in the T9 lamina and right pedicle with SUV max 5.3, previously 3.9.  That is also slightly increased FDG uptake to the left anterior fifth rib at the costochondral junction SUV max 3.9, previously 2.5.  There is slightly decreased FDG uptake in the right femoral neck lesion SUV max 2.2, previously 2.9.  FDG uptake in other bone lesions is fairly stable.  No new metastasis are seen.    CA 27-29 was 308 on 6/30/2020.  It was 286 on 5/19/2020.    Overall this is consistent with only slight progression versus stable disease.    She was started on Lovenox.    Cycle #9 eribulin 7/21/2020. CA 27-29 was 238    C#10 eribulin 8/18/2020. ( delayed by one week for ANC 0.9 )    C#11 Eribulin 9/8/2020    C#12 Eribulin 9/29/2020     C#13 Eribulin 10/20/2020     PET scan on 11/6/2020 shows progression of the disease with increased FDG uptake in the lytic lesions in the T9/T10 and right posterolateral elements as well as increased FDG uptake in the previously known hypermetabolic right iliac bone lesion suggesting recurrence of previously treated metastasis.  There is new subtle lesion in the right manubrium.  There is also a new fracture in the anterolateral left fourth rib with associated uptake and this could be a pathologic fracture.  Healing fracture in the left anterior fifth rib lesion.  There is resolution of the left pulmonary emboli.  Decreased FDG uptake of the esophagus consistent with improving inflammation.    CA 27-29 on 1020/2020 was also elevated at 489.    C#1 Trodelvy on 11/11/2020.  Cycle #2 Trodelvy 12/2/2020  Cycle number 2-day #8 Trodelvy 12/8/2020.    12/15/2020-12/16/2020.  She was admitted to the hospital with nausea vomiting and dehydration.  She had tachycardia and initial lactic acid of 5.  It decreased to 1.4 after 2 L of normal saline.  She also had  hypokalemia and ANC was 1.3.  She was treated with IV fluids.  Procalcitonin was negative.  CTA of the chest was negative for pulmonary embolism or pneumonia.  No bacterial infection was documented.  Her medication which she was initially unable to tolerate at home, were restarted and she was discharged home once started to feel better.      We delayed the start of cycle number 3 x 1 week and decrease the dose of chemotherapy by 25%.  She also had neutropenia with ANC of 1.0.      She started cycle number 3-day #1 on 12/29/2020.  CA 27-29 was 392.    Cycle number 3-day #8 1/5/2021.    C#4 Trodelvy planned for 1/202/2021- 75% dose        Interval history.  This is a video visit.    She has been doing better. She thinks that the dose reduction helped her in tolerating the chemo well. Nausea was not a big issue and she controlled it well with compazine. No diarrhea. No infections. Neuropathy has been mild and no change.  Energy is far. No dyspnea.   Pain is also better controlled and she uses MS contin at night and a couple of MSIR in a day, especially at night.       ECOG 1    ROS:  Rest of the comprehensive review of the system was essentially unremarkable.            I reviewed other history in epic as below.       PAST MEDICAL HISTORY:     1.  Breast cancer  2.  Multiple sclerosis.   3.  Depression.   4.  Migraines.   5.  Hypertension.   6.  Cholecystectomy and umbilical hernia repair.     SOCIAL HISTORY: She smoked for 5 years but quit many years ago.  Drinks alcohol socially.  She lives with her .  She is a teacher in middle school.       FAMILY HISTORY: She mentions that her mother had breast cancer at 49.  Both grandmothers had breast cancer but she is not sure of the age.  A couple of cousins have cancers.  Patient has 3 children who are healthy.    Current Outpatient Medications   Medication     acetaminophen (TYLENOL) 500 MG tablet     apixaban ANTICOAGULANT (ELIQUIS) 5 MG tablet     busPIRone  "(BUSPAR) 15 MG tablet     calcium citrate-vitamin D (CITRACAL) 315-250 MG-UNIT TABS     Cholecalciferol (VITAMIN D3 PO)     cyclopentolate (CYCLOGYL) 1 % ophthalmic solution     diclofenac (VOLTAREN) 1 % topical gel     ibuprofen (ADVIL/MOTRIN) 600 MG tablet     LORazepam (ATIVAN) 1 MG tablet     magnesium 250 MG tablet     meclizine (ANTIVERT) 25 MG tablet     morphine (MS CONTIN) 15 MG CR tablet     morphine (MSIR) 15 MG IR tablet     naloxone (NARCAN) 1 mg/mL for intranasal kit (2 syringes with 2 mucosal atomizer device)     polyethylene glycol (MIRALAX/GLYCOLAX) packet     potassium chloride ER (KLOR-CON M) 10 MEQ CR tablet     prochlorperazine (COMPAZINE) 10 MG tablet     propranolol ER (INDERAL LA) 80 MG 24 hr capsule     senna-docusate (SENOKOT-S/PERICOLACE) 8.6-50 MG tablet     syringe/needle, disp, (B-D INTEGRA SYRINGE) 23G X 1\" 3 ML MISC     traZODone (DESYREL) 50 MG tablet     venlafaxine (EFFEXOR-XR) 75 MG 24 hr capsule     loperamide (IMODIUM) 2 MG capsule     No current facility-administered medications for this visit.         Allergies   Allergen Reactions     Bactrim [Sulfamethoxazole W/Trimethoprim]      Internal bleeding     Copaxone [Glatiramer] Hives          PHYSICAL EXAMINATION:     Wt 58.5 kg (129 lb)   BMI 23.59 kg/m    Wt Readings from Last 4 Encounters:   01/19/21 58.5 kg (129 lb)   01/05/21 58.7 kg (129 lb 6.4 oz)   12/29/20 59.2 kg (130 lb 8 oz)   12/22/20 58.7 kg (129 lb 4.8 oz)           Constitutional.  Does not seem to be in any acute distress.  Eyes.  No redness or discharge noted.  Respiratory.  Speaking in full sentences.  Breathing seems comfortable without any accessory use of muscles.    Skin.  Visualized his skin does not show any obvious rashes.  Musculoskeletal.  Range of motion for visualized areas is intact.  Neurological.  Alert and oriented x3.  Psychiatric.  Mood, mentation and affect are normal.  Decision making capacity is intact.      The rest of a comprehensive " physical examination is deferred due to [facility] Emergency video visit restrictions.          Labs and Imaging.    Reviewed.  On 1/5/2021  CBC was unremarkable.  CMP unremarkable.   CA 27-29 on 12/29/2021 was 392          Combined Report of:    PET and CT on  11/6/2020 10:44 AM :     1. PET of the neck, chest, abdomen, and pelvis.  2. PET CT Fusion for Attenuation Correction and Anatomical  Localization:    3. Diagnostic CT scan of the chest, abdomen, and pelvis with  intravenous contrast for interpretation.  3. CT of the chest, abdomen and pelvis obtained for diagnostic  interpretation.  4. 3D MIP and PET-CT fused images were processed on an independent  workstation and archived to PACS and reviewed by a radiologist.     Technique:     1. PET: The patient received 12.41 mCi of F-18-FDG; the serum glucose  was 100 and prior to administration, body weight was 59.2 kg. Images  were evaluated in the axial, sagittal, and coronal planes as well as  the rotational whole body MIP. Images were acquired from the Vertex to  the Feet.     UPTAKE WAS MEASURED AT 60 MINUTES.      BACKGROUND:  Liver SUV max= 3.4,   Aorta Blood SUV Max: 2.0.      2. CT: Volumetric acquisition for clinical interpretation of the  chest, abdomen, and pelvis acquired at 3 mm sections after the  uneventful administration of intravenous contrast. The chest, abdomen,  and pelvis were evaluated at 5 mm sections in bone, soft tissue, and  lung windows.       The patient received 80 cc. Of Isovue 370 intravenously and 500 mL  oral present contrast for the examination.       3. 3D MIP and PET-CT fused images were processed on an independent  workstation and archived to PACS and reviewed by a radiologist.     INDICATION: Invasive breast cancer     ADDITIONAL INFORMATION OBTAINED FROM EMR: 60-year-old woman with a  history of invasive lobular carcinoma of the right breast diagnosed in  2006. With chemoradiation and hormonal therapy. Underwent  bilateral  mastectomy 2016. Radiotherapy to the lumbar spine T12-L5 in September 2019. Currently on therapy with Eribulin.     COMPARISON: PET-CT 7/17/2020     FINDINGS:      HEAD/NECK:  There is no suspicious FDG uptake in the neck.      The paranasal sinuses are clear. The mastoid air cells are clear. The  mucosal pharyngeal space, the , prevertebral and carotid  spaces are within normal limits. No masses, mass effect or  pathologically enlarged lymph nodes are evident. The thyroid gland is  unremarkable.     CHEST:  There is no suspicious FDG uptake in the chest.      Postsurgical changes of bilateral mastectomy. No abnormal FDG uptake  within the soft tissues adjacent to the breast implants. No enlarged  or hypermetabolic lymph nodes in the axillae or elsewhere in the  chest.     Heart size is within normal limits. Normal caliber of the thoracic  aorta and main pulmonary artery. Resolution of the previously seen  left-sided pulmonary emboli. No new pulmonary emboli demonstrated.  Left chest wall Port-A-Cath catheter tip terminates at the superior  cavoatrial junction. Decreased FDG uptake to the esophagus, for  example distally with SUV max 4.6 (series 4, image 201), previously  5.7, suggesting decreased inflammation. There is a small sliding-type  hiatal hernia.     The central tracheobronchial tree is patent. No pleural effusion or  pneumothorax. No focal consolidation. Mild dependent atelectasis.  Linear attenuation within the lingula also favoring atelectasis. No  suspicious pulmonary nodule.      ABDOMEN AND PELVIS:  There is no suspicious FDG uptake in the abdomen or pelvis.     The liver, pancreas, spleen, and adrenal glands are unremarkable. The  gallbladder surgically absent. No intrahepatic or extrahepatic ductal  or ductal dilatation. Symmetric nephrographic enhancement of the  kidneys. No hydronephrosis. Bladder is decompressed. Prominence of the  bladder wall may be secondary to the  degree of nondistention. Atrophic  appearance of the uterus. Normal caliber of the small large bowel. No  free fluid or fluid collection. No enlarged or hypermetabolic lymph  nodes in the abdomen or pelvis. Normal caliber of the abdominal aorta.     LOWER EXTREMITIES:   No abnormal masses or hypermetabolic soft tissue lesions.     BONES:   Again demonstrated are the multiple mixed lytic and sclerotic lesions  throughout the axial skeleton including in the calvarium, spine, ribs,  sternum, and pelvis, as well as in the right femoral neck. Majority of  these lesions demonstrate low levels of FDG uptake are similar to  prior study. However, there are multiple lesions demonstrating mildly  increased FDG uptake since 7/17/2020. Examples include (series 4):   - Image 215: Increased uptake to the expansile lytic lesion involving  the T9 lamina and T10 right pedicle with SUV max 6.2, previously SUV  max 5.3.  - Image 312: Increased uptake to mixed sclerotic/lytic lesion in the  right iliac crest with SUV max for 0.8, previously 3.0.  - Image 184: Increased uptake to a subacute nondisplaced fracture in  the anterior left 4th rib, SUV max 3.3, previously no fracture with  SUV max 2.1.  - Image 187: Increased uptake to a lesion in the lateral right 7th  rib, which is located just anterior to an old fracture, SUV max 3.7,  previously 2.7.   - Image 339: Increased uptake to a mixed sclerotic/lytic lesion in the  left sacral ala near the sacroiliac joint, SUV max 4.3, previously  3.8.     New uptake to subtle lesion in the right manubrium with mild FDG  uptake, SUV max 2.8 (series 4, image 151)     The lesion in the anterior left fifth rib previously demonstrating  increased uptake demonstrates slightly decreased uptake on the study  with SUV max 3.5 (series 4, image 210), previously 3.9. Appearances  suggestive of healing fracture. There are additional multiple chronic  rib fractures bilaterally demonstrating low levels of  uptake similar  to minimally increased from prior study.     Multilevel degenerative changes in the spine. Unchanged severe  compression deformity of L1 with greater than 75% height loss.  Unchanged relative photopenia of the T12-L5 vertebral bodies, in  keeping with history of radiotherapy.                                                                      IMPRESSION: In this patient with a history of metastatic breast cancer  status post bilateral mastectomies, chemoradiation, and currently  undergoing therapy with Eribulin, there is disease progression since  7/17/2020:     1. Worsening metastatic disease to the bones:  1a. Increased FDG uptake to the lytic lesions in the T9/T10 right  posterolateral elements.  1b. Increased FDG uptake to a previously non-hypermetabolic mixed  sclerotic/lytic lesion in the right iliac bone, suggesting recurrence  of a previously treated metastasis.  1c. New subtle lesion in the right manubrium.  1d.  New fracture in the anterolateral left 4th rib with associated  uptake. This may be a pathologic fracture.  1e. Decreased uptake to the previously increased left anterior 5th rib  lesion, which appears to be a healing fracture.     2. Resolution of the previously new left pulmonary emboli.     3. Decreased FDG uptake to the esophagus, consistent with decreased  inflammation.    ASSESSMENT AND PLAN:   1.  History of stage IIB, T2 N1 M0 invasive lobular carcinoma of the right breast.  It was initially diagnosed in 2006 and at that time it was hormone receptor positive, HER-2 negative.  She was treated on ECOG 2104 protocol with dose-dense Adriamycin, Cytoxan and Avastin x4 cycles followed by Taxol and Avastin x4 cycles followed by a Avastin for 1 year.  She also received radiation to the right breast which she completed in January 2007 (5040 cGy).  She took Aromasin from 2007 to 2014.    Now she has metastatic breast cancer with extensive involvement of the bones.  There is also a  left lower lobe hypermetabolic lesion and mediastinal lymph nodes which are hypermetabolic.  The biopsy from the right iliac bone is consistent with metastatic lobular breast cancer but it is hormone receptor and HER-2 negative and PDL 1 is also negative.    She has received 3000 cGy of palliative radiation to T12-L5 from 9/6/2019 till 9/18/2019.    She was started on palliative Xeloda but she was unable to tolerate even the dose reduced medication.        We decided on repeating scans and MRI brain on 10/25/2019 showed no intracranial metastatic disease.   Multiple enhancing calvarial lesions may be increased in number and are suggestive of metastatic disease. Thinner imaging on the current study may identify the smaller lesions and may be responsible for identified more lesions.   There is a single focus of T2 hyperintense signal within the anterior right temporal lobe may represent interval demyelination. There is no evidence for active demyelination.    PET/CT on 11/1/2019 showed favorable response to therapy and Overall FDG uptake within scattered osseous metastases is decreased since 8/30/2019, particularly within the pelvis. Increased sclerotic appearance of L1 and L4 pathologic compression deformities, likely sequela of recently completed palliative radiation therapy.    No significant FDG uptake in previously demonstrated hypermetabolic mediastinal lymph nodes. Biopsy negative on 9/5/2019.  There is also decreased FDG uptake in the left lower lobe (biopsy negative for malignancy), now with dense consolidation containing air bronchograms suggestive of infection versus aspiration.  There is diffuse FDG uptake within the esophagus, consistent with esophagitis.    Because of intolerance to Xeloda we decided on switching to weekly paclitaxel on 11/5/2019.    For better tolerance we decreased the dose of paclitaxel to 70 mg/m  with cycle #2.  She has completed 3 cycles of Taxol and the last chemotherapy was on  1/14/2020.      PET/CT on 1/24/2020 showed progression of the disease in some of the bone lesions including increase in size and lytic lesion within the vertebral body of C4 measuring 1 cm as opposed to 0.6 cm previously and a new central soft tissue nodule with SUV max 4.1 when previously it was non-hypermetabolic.  There is also some progression noted in the right proximal femur and right iliac bone.  There is decreased metabolic uptake in the right lamina of T9 with SUV max 4.7 when previously it was 8.0.  Other lesions are stable.    There are no pathologically enlarged mediastinal, hilar or axillary lymph nodes. Calcified subcarinal lymph nodes. There are no suspicious lung nodules or evidence for infection and previous left lower lobe consolidation has resolved.     CA 27-29 also had increased significantly and it was 257 on 1/28/2020.    Due to progression we switched to eribulin on 1/28/2020.        After 3 cycles, she had a repeat PET/CT on 3/27/2020 which showed a stable size of the bone metastatic lesions although the FDG avidity was less, consistent with treatment response.    She had MRI of the thoracic spine on 4/3/2020 and it was compared to the one which was done in August 2019 and it showed increased size of several metastatic lesions most notably at T9 but also at T5-T7.  Other lesions at T10, T11 and T12 appeared improved.    CA 27-29 was also down to 222 on 3/18/2020.    I believe overall this is consistent with a partial response to the treatment.  Overall she is tolerating eribulin well with some worsening of neuropathy and cytopenias.     We decided on continuing the chemotherapy with some modifications and she got cycle #4 with slightly decreased dose of eribulin to 1.2 mg/m2.      After 8 cycles of eribulin repeat PET CT on 7/17/2020 showed only minimally increased FDG uptake in T9 and T10 and in the left anterior fifth rib near the costochondral junction.  That is slightly decreased FDG  avidity in the right femoral neck lesion.  Otherwise bone disease is a stable.  No other evidence of metastatic disease is found.    CA 27-29 on 6/30/2020 was 308.    We decided on continuing the same chemotherapy because overall clinical picture was consistent with stable to only minimally progressive disease and she was tolerating treatments well with decent quality of life.    Cycle #10 was delayed by 1 week because ANC was 0.9.        After 13 cycles of eribulin, PET scan on 11/6/2020 shows progression of the disease with increased FDG uptake in the lytic lesions in the T9/T10 and right posterolateral elements as well as increased FDG uptake in the previously known hypermetabolic right iliac bone lesion suggesting recurrence of previously treated metastasis.  There is new subtle lesion in the right manubrium.  There is also a new fracture in the anterolateral left fourth rib with associated uptake and this could be a pathologic fracture.  Healing fracture in the left anterior fifth rib lesion.  There is resolution of the left pulmonary emboli.  Decreased FDG uptake of the esophagus consistent with improving inflammation.    Because of progression of the disease, I recommend switching to recently FDA approved sacituzumab govitecan-hziy (Trodelvy) for TNBC, based on the results of the phase II IMMU-132-01 clinical trial.   We discussed the rational schedule and potential side effects of it in detail.    We gave it to her on 11/11/2020.    She had significant nausea and vomiting with it but otherwise tolerated it well.   She started cycle #2 on 12/2/2020 and received day 8 on 12/8/2020.  She then got admitted for nausea vomiting dehydration and weakness.     We delayed the start of cycle number 3 x 1 week and decrease the dose of chemotherapy by 25% and she also has neutropenia with ANC of 1.  She started cycle number 3-day #1 on 12/29/2020.    CA 27-29 was 392.    Cycle number 3-day #8 1/5/2021.  She tolerated the  Lovenox chemotherapy very well.  We will proceed with cycle #4 tomorrow.  We will continue to follow CA 27-29.  I will likely repeat scans after cycle #5.  If CA 27-29 continues to significantly increase then potentially we can do the scan after cycle #4.        Nausea and vomiting.  This is under much better control.  Continue Emend Decadron and Zofran.  Continue as needed Compazine.     Diarrhea.  This has not been an issue recently.  She can use as needed Imodium.        Bone disease.  She has metastatic disease to the bone.  Previously she got palliative radiation to T12-L5 3000 cGy in 10 fractions from 9/6/2019 till 9/18/2019.  She was evaluated by orthopedics Dr. Bipin Elliott on 11/1/2019 and conservative management was recommended.    We are giving her Zometa every 3 months. She last received on 9/29/2020.  Because of progression of the disease in the bones, I recommended switching to Xgeva which she received on 11/11/2020.  She then got Xgeva on 12/22/2020.  She will again get it tomorrow 1/20/2021.  Continue calcium and vitamin D.      Leukopenia/Neutropenia.  We delayed chemotherapy by 1 week and also dose reduced it by 25% starting cycle #3.    ANC is now normal.  Repeat labs tomorrow.        Pain management.  This is under decent control.  Continue MS Contin and immediate release morphine sulfate.  She has follow-up appointment with palliative care in a couple of weeks.        Subcutaneous nodule in the scalp.  I am not certain whether it is a cyst or a metastatic deposit.  Currently it is not bothering her.  She again tells me that it is a stable.  We will continue to monitor.       Bilateral pulmonary emboli.  She is a stable on Eliquis.  PE was incidentally found on 7/17/2020.        Neuropathy.  She has mild neuropathy which has not worsened on Trodelvy.  Continue to monitor.  Because of her underlying multiple sclerosis she has chronic balance issues and heat and cold sensitivity.    We did  not address the following today.    Sleeping problems.  Continue trazodone.    Vision changes.    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  It was thought that this could be due to Taxol as patient reported to the ophthalmologist that she was on Taxol in September 2019 when her symptoms started.  But she was not on Taxol in September 2019 when she started noticing the visual changes so Taxol is not responsible for this.  The first dose of Taxol was on 11/5/2019.   Her vision is much better now.  She will continue to follow closely with ophthalmology.       Increased FDG ability in the colon.  She had FDG uptake in the cecum and ascending colon but no corresponding lesion was seen on the CT scan.  She is completely asymptomatic so at this time we will continue to observe.    Discussion regarding healthcare directives.  On previous visit she told me that she will bring the health care directive for our records but she forgot so I told her to bring it on next visit.      Breast implant removal.  She tells me that she was planning to do breast implant removal because there was a recall on this.  Her surgery was scheduled for 9/24/2019.  Because of this new development of metastatic breast cancer and her recent radiation, surgery has been canceled.  We discussed that at this time treatment for metastatic breast cancer would take precedence over the other surgery as the other surgery would require her to be off chemotherapy for several weeks and in that case the chances of cancer progression would be high and I would not recommend that.    Multiple sclerosis.  She will continue to follow with her neurologist Dr. Quiles.  Currently multiple sclerosis is under control and currently she is not taking any medications.      I will see her back in 3 weeks.    All of her questions were answered to her satisfaction.  She is agreeable and comfortable with the plan.  Dewayne Zepeda,  MD    Video start time. 2:53 PM  Video stop time. 3:00 PM    I spent 30 minutes on this visit including the video time, reviewing records and labs and imaging and placing new orders as well as documentation.

## 2021-01-19 NOTE — LETTER
1/19/2021         RE: Shaneka Alvarez  [id number] [facility] [id number]        Dear Colleague,    Thank you for referring your patient, Shaneka Alvarez, to the [facility]. Please see a copy of my visit note below.      ONCOLOGY FOLLOW UP NOTE: 1/19/2021        I took the history from reviewing the previous notes that she was following with Dr. Amaya.  I have copied and updated from prior notes.    ONCOLOGY History:    1.  January 2006:  Diagnosed with Stage IIB, T2 N1 M0 invasive lobular carcinoma of the right breast.  Final pathology showed a 4.5 x 3.8 x 2.5 cm, 01/14 lymph nodes positive.  Estrogen, progesterone receptor positive, HER-2 negative.     2.  Genetics.  BRCA1 and 2 mutations not detected. 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       THERAPY TO DATE:   1. 2006:  ECOG 2104 protocol of dose-dense Adriamycin, Cytoxan and Avastin x4 cycles followed by Taxol and Avastin x4 cycles.   2.  03/2007:  Completed 1 year Avastin.   3.  11/2006-01/2007:  Radiotherapy to the right breast of 5040 cGy.   4.  03/[id number]:  Aromasin.  Stopped after moving to the [facility].   5.  01/2016:  Right modified radical mastectomy with latissimus dorsi flap reconstruction and a left prophylactic mastectomy with latissimus dorsi flap reconstruction.     She recently had MRI of the brain as a follow-up for multiple sclerosis and there were multiple calvarial lesions noted which was suspicious for metastatic disease.  There was no evidence of new multiple sclerosis lesions.  She had a PET scan on 8/30/2019 which showed widespread bone metastatic lesions (calvarium, spine, sacrum, pelvis, ribs most prominent at C7, T3, L1 and L4), hypermetabolic 3 cm lesion in LLL, and mediastinal/hilar LN. CEA wnl at 1.9 and C27-29 elevated to 415 (28 on 8/23/16). A CT guided biopsy of the right iliac bone was obtained on 9/4/19, pathology consistent with metastatic adenocarcinoma (breast),  ER/[place] negative, HER-2 negative PDL 1 < 1%. A second biopsy was take of the LLL nodule via EBUS on 9/5/19 did not show any malignancy.    C/T/L spine MRI 8/3/19 to 9/2/19 with numerous enhancing lesions of the C, T and L spine, largest around T9 and L1. No evidence of cord compression. Has a L1 compression fracture and impending L4.     Received radiation T12-L5 3000 cGy in 10 fractions from 9/6/2019 till 9/18/2019.  Neurosurgery recommended no surgical intervention but to wear [place] brace when out of bed and HOB >30 degrees    She started palliative Xeloda 2000/1500 on 10/1/2019 but after just a few doses she noticed nausea, red eyes blurred vision, loss of appetite.  She stopped taking it after 5 doses and was seen by nurse practitioner on 10/7/2019 when she was improving.  At that point she was restarted on Xeloda at a lower dose of 1000 mg twice a day.  As she was not able to tolerate even the lower dose with extreme nausea and vomiting and feeling extremely fatigued and blurry vision, we decided on stopping Xeloda.    We decided on repeating scans and MRI brain on 10/25/2019 showed no intracranial metastatic disease.   Multiple enhancing calvarial lesions may be increased in number and are suggestive of metastatic disease. Thinner imaging on the current study may identify the smaller lesions and may be responsible for  identified more lesions.   There is a single focus of T2 hyperintense signal within the anterior right temporal lobe may represent interval demyelination. There is no evidence for active demyelination.    PET/CT on 11/1/2019 showed favorable response to therapy and Overall FDG uptake within scattered osseous metastases is decreased since 8/30/2019, particularly within the pelvis. Increased sclerotic appearance of L1 and L4 pathologic compression deformities, likely sequela of recently completed palliative radiation therapy.    No significant FDG uptake in previously demonstrated hypermetabolic  mediastinal lymph nodes. Biopsy negative on 9/5/2019.  There is also decreased FDG uptake in the left lower lobe (biopsy negative for  malignancy), now with dense consolidation containing air bronchograms suggestive of infection versus aspiration.  There is diffuse FDG uptake within the esophagus, consistent with esophagitis.    Because of intolerance to Xeloda we decided on switching to weekly paclitaxel on 11/5/2019.    C#2 Taxol 12/4/19- started with 70mg/m2 dose reduction    C#3 Taxol 12/30/2019     PET/CT on 1/24/2020 showed progression of the disease in some of the bone lesions including increase in size and lytic lesion within the vertebral body of C4 measuring 1 cm as opposed to 0.6 cm previously and a new central soft tissue nodule with SUV max 4.1 when previously it was non-hypermetabolic.  There is also some progression noted in the right proximal femur and right iliac bone.  There is decreased metabolic uptake in the right lamina of T9 with SUV max 4.7 when previously it was 8.0.  Other lesions are stable.    CA 27-29 also had increased significantly and it was 257 on 1/28/2020.    We decided on switching to eribulin on 1/28/2020.    C#2 2/18/2020  C#2 D#8 2/25/2020    C#3 D#1 3/18/2020 -delayed by 1 week as per patient's preference because she wanted to visit her family.    She had a repeat PET/CT on 3/27/2020 which showed stable size of the bone metastatic lesions although the FDG avidity was less, consistent with treatment response.    She had MRI of the thoracic spine on 4/3/2020 and it was compared to the one which was done in August 2019 and it showed increased size of several metastatic lesions most notably at T9 but also at T5-T7.  Other lesions at T10, T11 and T12 appeared improved.    CA 27-29 was also down to 222 on 3/18/2020.    Cycle #4 eribulin ( dose reduced to 1.2 mg/m2 )-4/7/2020-   Cycle #5 Eribulin ( 1.2 mg/m2 )- 4/28/2020   Cycle #6 Eribulin ( 1.2 mg/m2 )- 5/19/2020     Cycle #7  Eribulin ( 1.2 mg/m2 )- 6/9/2020    Cycle #8 Eribulin ( 1.2 mg/m2 )- 6/30/2020     She had a repeat PET scan on 7/17/2020 which showed incidental finding of new acute bilateral pulmonary embolism in the distal left main pulmonary artery extending in the left upper and lower lobes and in the segmental and branch arteries in the right lower lobe.    It also showed mildly increased FDG avidity in the lytic lesion in the T9 lamina and right pedicle with SUV max 5.3, previously 3.9.  That is also slightly increased FDG uptake to the left anterior fifth rib at the costochondral junction SUV max 3.9, previously 2.5.  There is slightly decreased FDG uptake in the right femoral neck lesion SUV max 2.2, previously 2.9.  FDG uptake in other bone lesions is fairly stable.  No new metastasis are seen.    CA 27-29 was 308 on 6/30/2020.  It was 286 on 5/19/2020.    Overall this is consistent with only slight progression versus stable disease.    She was started on Lovenox.    Cycle #9 eribulin 7/21/2020. CA 27-29 was 238    C#10 eribulin 8/18/2020. ( delayed by one week for ANC 0.9 )    C#11 Eribulin 9/8/2020    C#12 Eribulin 9/29/2020     C#13 Eribulin 10/20/2020     PET scan on 11/6/2020 shows progression of the disease with increased FDG uptake in the lytic lesions in the T9/T10 and right posterolateral elements as well as increased FDG uptake in the previously known hypermetabolic right iliac bone lesion suggesting recurrence of previously treated metastasis.  There is new subtle lesion in the right manubrium.  There is also a new fracture in the anterolateral left fourth rib with associated uptake and this could be a pathologic fracture.  Healing fracture in the left anterior fifth rib lesion.  There is resolution of the left pulmonary emboli.  Decreased FDG uptake of the esophagus consistent with improving inflammation.    CA 27-29 on 1020/2020 was also elevated at 489.    C#1 Trodelvy on 11/11/2020.  Cycle #2 Trodelvy  12/2/2020  Cycle number 2-day #8 Trodelvy 12/8/2020.    12/15/2020-12/16/2020.  She was admitted to the hospital with nausea vomiting and dehydration.  She had tachycardia and initial lactic acid of 5.  It decreased to 1.4 after 2 L of normal saline.  She also had hypokalemia and ANC was 1.3.  She was treated with IV fluids.  Procalcitonin was negative.  CTA of the chest was negative for pulmonary embolism or pneumonia.  No bacterial infection was documented.  Her medication which she was initially unable to tolerate at home, were restarted and she was discharged home once started to feel better.      We delayed the start of cycle number 3 x 1 week and decrease the dose of chemotherapy by 25%.  She also had neutropenia with ANC of 1.0.      She started cycle number 3-day #1 on 12/29/2020.  CA 27-29 was 392.    Cycle number 3-day #8 1/5/2021.    C#4 Trodelvy planned for 1/202/2021- 75% dose        Interval history.  This is a video visit.    She has been doing better. She thinks that the dose reduction helped her in tolerating the chemo well. Nausea was not a big issue and she controlled it well with compazine. No diarrhea. No infections. Neuropathy has been mild and no change.  Energy is far. No dyspnea.   Pain is also better controlled and she uses MS contin at night and a couple of MSIR in a day, especially at night.       ECOG 1    ROS:  Rest of the comprehensive review of the system was essentially unremarkable.            I reviewed other history in epic as below.       PAST MEDICAL HISTORY:     1.  Breast cancer  2.  Multiple sclerosis.   3.  Depression.   4.  Migraines.   5.  Hypertension.   6.  Cholecystectomy and umbilical hernia repair.     SOCIAL HISTORY: She smoked for 5 years but quit many years ago.  Drinks alcohol socially.  She lives with her .  She is a teacher in middle school.       FAMILY HISTORY: She mentions that her mother had breast cancer at 49.  Both grandmothers had breast cancer  "but she is not sure of the age.  A couple of cousins have cancers.  Patient has 3 children who are healthy.    Current Outpatient Medications   Medication     acetaminophen (TYLENOL) 500 MG tablet     apixaban ANTICOAGULANT (ELIQUIS) 5 MG tablet     busPIRone (BUSPAR) 15 MG tablet     calcium citrate-vitamin D (CITRACAL) 315-250 MG-UNIT TABS     Cholecalciferol (VITAMIN D3 PO)     cyclopentolate (CYCLOGYL) 1 % ophthalmic solution     diclofenac (VOLTAREN) 1 % topical gel     ibuprofen (ADVIL/MOTRIN) 600 MG tablet     LORazepam (ATIVAN) 1 MG tablet     magnesium 250 MG tablet     meclizine (ANTIVERT) 25 MG tablet     morphine (MS CONTIN) 15 MG CR tablet     morphine (MSIR) 15 MG IR tablet     naloxone (NARCAN) 1 mg/mL for intranasal kit (2 syringes with 2 mucosal atomizer device)     polyethylene glycol (MIRALAX/GLYCOLAX) packet     potassium chloride ER (KLOR-CON M) 10 MEQ CR tablet     prochlorperazine (COMPAZINE) 10 MG tablet     propranolol ER (INDERAL LA) 80 MG 24 hr capsule     senna-docusate (SENOKOT-S/PERICOLACE) 8.6-50 MG tablet     syringe/needle, disp, (B-D INTE[employer]RA SYRINGE) 23G X 1\" 3 ML MISC     traZODone (DESYREL) 50 MG tablet     venlafaxine (EFFEXOR-XR) 75 MG 24 hr capsule     loperamide (IMODIUM) 2 MG capsule     No current facility-administered medications for this visit.         Allergies   Allergen Reactions     Bactrim [Sulfamethoxazole W/Trimethoprim]      Internal bleeding     Copaxone [Glatiramer] Hives          PHYSICAL EXAMINATION:     Wt 58.5 kg (129 lb)   BMI 23.59 kg/m    Wt Readings from Last 4 Encounters:   01/19/21 58.5 kg (129 lb)   01/05/21 58.7 kg (129 lb 6.4 oz)   12/29/20 59.2 kg (130 lb 8 oz)   12/22/20 58.7 kg (129 lb 4.8 oz)           Constitutional.  Does not seem to be in any acute distress.  Eyes.  No redness or discharge noted.  Respiratory.  Speaking in full sentences.  Breathing seems comfortable without any accessory use of muscles.    Skin.  Visualized his skin does not " show any obvious rashes.  Musculoskeletal.  Range of motion for visualized areas is intact.  Neurological.  Alert and oriented x3.  Psychiatric.  Mood, mentation and affect are normal.  Decision making capacity is intact.      The rest of a comprehensive physical examination is deferred due to Public Health Emergency video visit restrictions.          Labs and Imaging.    Reviewed.  On 1/5/2021  CBC was unremarkable.  CMP unremarkable.   CA 27-29 on 12/29/2021 was 392          Combined Report of:    PET and CT on  11/6/2020 10:44 AM :     1. PET of the neck, chest, abdomen, and pelvis.  2. PET CT Fusion for Attenuation Correction and Anatomical  Localization:    3. Diagnostic CT scan of the chest, abdomen, and pelvis with  intravenous contrast for interpretation.  3. CT of the chest, abdomen and pelvis obtained for diagnostic  interpretation.  4. 3D MIP and PET-CT fused images were processed on an independent  workstation and archived to PACS and reviewed by a radiologist.     Technique:     1. PET: The patient received 12.41 mCi of F-18-FDG; the serum glucose  was 100 and prior to administration, body weight was 59.2 kg. Images  were evaluated in the axial, sagittal, and coronal planes as well as  the rotational whole body MIP. Images were acquired from the Vertex to  the Feet.     UPTAKE WAS MEASURED AT 60 MINUTES.      BACKGROUND:  Liver SUV max= 3.4,   Aorta Blood SUV Max: 2.0.      2. CT: Volumetric acquisition for clinical interpretation of the  chest, abdomen, and pelvis acquired at 3 mm sections after the  uneventful administration of intravenous contrast. The chest, abdomen,  and pelvis were evaluated at 5 mm sections in bone, soft tissue, and  lung windows.       The patient received 80 cc. Of Isovue 370 intravenously and 500 mL  oral present contrast for the examination.       3. 3D MIP and PET-CT fused images were processed on an independent  workstation and archived to PACS and reviewed by a  radiologist.     INDICATION: Invasive breast cancer     ADDITIONAL INFORMATION OBTAINED FROM EMR: 60-year-old woman with a  history of invasive lobular carcinoma of the right breast diagnosed in  2006. With chemoradiation and hormonal therapy. Underwent bilateral  mastectomy 2016. Radiotherapy to the lumbar spine T12-L5 in September 2019. Currently on therapy with Eribulin.     COMPARISON: PET-CT 7/17/2020     FINDINGS:      HEAD/NECK:  There is no suspicious FDG uptake in the neck.      The paranasal sinuses are clear. The mastoid air cells are clear. The  mucosal pharyngeal space, the , prevertebral and carotid  spaces are within normal limits. No masses, mass effect or  pathologically enlarged lymph nodes are evident. The thyroid gland is  unremarkable.     CHEST:  There is no suspicious FDG uptake in the chest.      Postsurgical changes of bilateral mastectomy. No abnormal FDG uptake  within the soft tissues adjacent to the breast implants. No enlarged  or hypermetabolic lymph nodes in the axillae or elsewhere in the  chest.     Heart size is within normal limits. Normal caliber of the thoracic  aorta and main pulmonary artery. Resolution of the previously seen  left-sided pulmonary emboli. No new pulmonary emboli demonstrated.  Left chest wall Port-A-Cath catheter tip terminates at the superior  cavoatrial junction. Decreased FDG uptake to the esophagus, for  example distally with SUV max 4.6 (series 4, image 201), previously  5.7, suggesting decreased inflammation. There is a small sliding-type  hiatal hernia.     The central tracheobronchial tree is patent. No pleural effusion or  pneumothorax. No focal consolidation. Mild dependent atelectasis.  Linear attenuation within the lingula also favoring atelectasis. No  suspicious pulmonary nodule.      ABDOMEN AND PELVIS:  There is no suspicious FDG uptake in the abdomen or pelvis.     The liver, pancreas, spleen, and adrenal glands are unremarkable.  The  gallbladder surgically absent. No intrahepatic or extrahepatic ductal  or ductal dilatation. Symmetric nephrographic enhancement of the  kidneys. No hydronephrosis. Bladder is decompressed. Prominence of the  bladder wall may be secondary to the degree of nondistention. Atrophic  appearance of the uterus. Normal caliber of the small large bowel. No  free fluid or fluid collection. No enlarged or hypermetabolic lymph  nodes in the abdomen or pelvis. Normal caliber of the abdominal aorta.     LOWER EXTREMITIES:   No abnormal masses or hypermetabolic soft tissue lesions.     BONES:   Again demonstrated are the multiple mixed lytic and sclerotic lesions  throughout the axial skeleton including in the calvarium, spine, ribs,  sternum, and pelvis, as well as in the right femoral neck. Majority of  these lesions demonstrate low levels of FDG uptake are similar to  prior study. However, there are multiple lesions demonstrating mildly  increased FDG uptake since 7/17/2020. Examples include (series 4):   - Image 215: Increased uptake to the expansile lytic lesion involving  the T9 lamina and T10 right pedicle with SUV max 6.2, previously SUV  max 5.3.  - Image 312: Increased uptake to mixed sclerotic/lytic lesion in the  right iliac crest with SUV max for 0.8, previously 3.0.  - Image 184: Increased uptake to a subacute nondisplaced fracture in  the anterior left 4th rib, SUV max 3.3, previously no fracture with  SUV max 2.1.  - Image 187: Increased uptake to a lesion in the lateral right 7th  rib, which is located just anterior to an old fracture, SUV max 3.7,  previously 2.7.   - Image 339: Increased uptake to a mixed sclerotic/lytic lesion in the  left sacral ala near the sacroiliac joint, SUV max 4.3, previously  3.8.     New uptake to subtle lesion in the right manubrium with mild FDG  uptake, SUV max 2.8 (series 4, image 151)     The lesion in the anterior left fifth rib previously demonstrating  increased uptake  demonstrates slightly decreased uptake on the study  with SUV max 3.5 (series 4, image 210), previously 3.9. Appearances  suggestive of healing fracture. There are additional multiple chronic  rib fractures bilaterally demonstrating low levels of uptake similar  to minimally increased from prior study.     Multilevel degenerative changes in the spine. Unchanged severe  compression deformity of L1 with greater than 75% height loss.  Unchanged relative photopenia of the T12-L5 vertebral bodies, in  keeping with history of radiotherapy.                                                                      IMPRESSION: In this patient with a history of metastatic breast cancer  status post bilateral mastectomies, chemoradiation, and currently  undergoing therapy with Eribulin, there is disease progression since  7/17/2020:     1. Worsening metastatic disease to the bones:  1a. Increased FDG uptake to the lytic lesions in the T9/T10 right  posterolateral elements.  1b. Increased FDG uptake to a previously non-hypermetabolic mixed  sclerotic/lytic lesion in the right iliac bone, suggesting recurrence  of a previously treated metastasis.  1c. New subtle lesion in the right manubrium.  1d.  New fracture in the anterolateral left 4th rib with associated  uptake. This may be a pathologic fracture.  1e. Decreased uptake to the previously increased left anterior 5th rib  lesion, which appears to be a healing fracture.     2. Resolution of the previously new left pulmonary emboli.     3. Decreased FDG uptake to the esophagus, consistent with decreased  inflammation.    ASSESSMENT AND PLAN:   1.  History of stage IIB, T2 N1 M0 invasive lobular carcinoma of the right breast.  It was initially diagnosed in 2006 and at that time it was hormone receptor positive, HER-2 negative.  She was treated on ECOG 2104 protocol with dose-dense Adriamycin, Cytoxan and Avastin x4 cycles followed by Taxol and Avastin x4 cycles followed by a Avastin  for 1 year.  She also received radiation to the right breast which she completed in January 2007 (5040 cGy).  She took Aromasin from 2007 to 2014.    Now she has metastatic breast cancer with extensive involvement of the bones.  There is also a left lower lobe hypermetabolic lesion and mediastinal lymph nodes which are hypermetabolic.  The biopsy from the right iliac bone is consistent with metastatic lobular breast cancer but it is hormone receptor and HER-2 negative and PDL 1 is also negative.    She has received 3000 cGy of palliative radiation to T12-L5 from 9/6/2019 till 9/18/2019.    She was started on palliative Xeloda but she was unable to tolerate even the dose reduced medication.        We decided on repeating scans and MRI brain on 10/25/2019 showed no intracranial metastatic disease.   Multiple enhancing calvarial lesions may be increased in number and are suggestive of metastatic disease. Thinner imaging on the current study may identify the smaller lesions and may be responsible for identified more lesions.   There is a single focus of T2 hyperintense signal within the anterior right temporal lobe may represent interval demyelination. There is no evidence for active demyelination.    PET/CT on 11/1/2019 showed favorable response to therapy and Overall FDG uptake within scattered osseous metastases is decreased since 8/30/2019, particularly within the pelvis. Increased sclerotic appearance of L1 and L4 pathologic compression deformities, likely sequela of recently completed palliative radiation therapy.    No significant FDG uptake in previously demonstrated hypermetabolic mediastinal lymph nodes. Biopsy negative on 9/5/2019.  There is also decreased FDG uptake in the left lower lobe (biopsy negative for malignancy), now with dense consolidation containing air bronchograms suggestive of infection versus aspiration.  There is diffuse FDG uptake within the esophagus, consistent with  esophagitis.    Because of intolerance to Xeloda we decided on switching to weekly paclitaxel on 11/5/2019.    For better tolerance we decreased the dose of paclitaxel to 70 mg/m  with cycle #2.  She has completed 3 cycles of Taxol and the last chemotherapy was on 1/14/2020.      PET/CT on 1/24/2020 showed progression of the disease in some of the bone lesions including increase in size and lytic lesion within the vertebral body of C4 measuring 1 cm as opposed to 0.6 cm previously and a new central soft tissue nodule with SUV max 4.1 when previously it was non-hypermetabolic.  There is also some progression noted in the right proximal femur and right iliac bone.  There is decreased metabolic uptake in the right lamina of T9 with SUV max 4.7 when previously it was 8.0.  Other lesions are stable.    There are no pathologically enlarged mediastinal, hilar or axillary lymph nodes. Calcified subcarinal lymph nodes. There are no suspicious lung nodules or evidence for infection and previous left lower lobe consolidation has resolved.     CA 27-29 also had increased significantly and it was 257 on 1/28/2020.    Due to progression we switched to eribulin on 1/28/2020.        After 3 cycles, she had a repeat PET/CT on 3/27/2020 which showed a stable size of the bone metastatic lesions although the FDG avidity was less, consistent with treatment response.    She had MRI of the thoracic spine on 4/3/2020 and it was compared to the one which was done in August 2019 and it showed increased size of several metastatic lesions most notably at T9 but also at T5-T7.  Other lesions at T10, T11 and T12 appeared improved.    CA 27-29 was also down to 222 on 3/18/2020.    I believe overall this is consistent with a partial response to the treatment.  Overall she is tolerating eribulin well with some worsening of neuropathy and cytopenias.     We decided on continuing the chemotherapy with some modifications and she got cycle #4 with  slightly decreased dose of eribulin to 1.2 mg/m2.      After 8 cycles of eribulin repeat PET CT on 7/17/2020 showed only minimally increased FDG uptake in T9 and T10 and in the left anterior fifth rib near the costochondral junction.  That is slightly decreased FDG avidity in the right femoral neck lesion.  Otherwise bone disease is a stable.  No other evidence of metastatic disease is found.    CA 27-29 on 6/30/2020 was 308.    We decided on continuing the same chemotherapy because overall clinical picture was consistent with stable to only minimally progressive disease and she was tolerating treatments well with decent quality of life.    Cycle #10 was delayed by 1 week because ANC was 0.9.        After 13 cycles of eribulin, PET scan on 11/6/2020 shows progression of the disease with increased FDG uptake in the lytic lesions in the T9/T10 and right posterolateral elements as well as increased FDG uptake in the previously known hypermetabolic right iliac bone lesion suggesting recurrence of previously treated metastasis.  There is new subtle lesion in the right manubrium.  There is also a new fracture in the anterolateral left fourth rib with associated uptake and this could be a pathologic fracture.  Healing fracture in the left anterior fifth rib lesion.  There is resolution of the left pulmonary emboli.  Decreased FDG uptake of the esophagus consistent with improving inflammation.    Because of progression of the disease, I recommend switching to recently FDA approved sacituzumab govitecan-hziy (Trodelvy) for TNBC, based on the results of the phase II IMMU-132-01 clinical trial.   We discussed the rational schedule and potential side effects of it in detail.    We gave it to her on 11/11/2020.    She had significant nausea and vomiting with it but otherwise tolerated it well.   She started cycle #2 on 12/2/2020 and received day 8 on 12/8/2020.  She then got admitted for nausea vomiting dehydration and weakness.      We delayed the start of cycle number 3 x 1 week and decrease the dose of chemotherapy by 25% and she also has neutropenia with ANC of 1.  She started cycle number 3-day #1 on 12/29/2020.    CA 27-29 was 392.    Cycle number 3-day #8 1/5/2021.  She tolerated the Lovenox chemotherapy very well.  We will proceed with cycle #4 tomorrow.  We will continue to follow CA 27-29.  I will likely repeat scans after cycle #5.  If CA 27-29 continues to significantly increase then potentially we can do the scan after cycle #4.        Nausea and vomiting.  This is under much better control.  Continue Emend Decadron and Zofran.  Continue as needed Compazine.     Diarrhea.  This has not been an issue recently.  She can use as needed Imodium.        Bone disease.  She has metastatic disease to the bone.  Previously she got palliative radiation to T12-L5 3000 cGy in 10 fractions from 9/6/2019 till 9/18/2019.  She was evaluated by orthopedics Dr. Bipin Elliott on 11/1/2019 and conservative management was recommended.    We are giving her Zometa every 3 months. She last received on 9/29/2020.  Because of progression of the disease in the bones, I recommended switching to Xgeva which she received on 11/11/2020.  She then got Xgeva on 12/22/2020.  She will again get it tomorrow 1/20/2021.  Continue calcium and vitamin D.      Leukopenia/Neutropenia.  We delayed chemotherapy by 1 week and also dose reduced it by 25% starting cycle #3.    ANC is now normal.  Repeat labs tomorrow.        Pain management.  This is under decent control.  Continue MS Contin and immediate release morphine sulfate.  She has follow-up appointment with palliative care in a couple of weeks.        Subcutaneous nodule in the scalp.  I am not certain whether it is a cyst or a metastatic deposit.  Currently it is not bothering her.  She again tells me that it is a stable.  We will continue to monitor.       Bilateral pulmonary emboli.  She is a stable on  Eliquis.  PE was incidentally found on 7/17/2020.        Neuropathy.  She has mild neuropathy which has not worsened on Trodelvy.  Continue to monitor.  Because of her underlying multiple sclerosis she has chronic balance issues and heat and cold sensitivity.    We did not address the following today.    Sleeping problems.  Continue trazodone.    Vision changes.    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  It was thought that this could be due to Taxol as patient reported to the ophthalmologist that she was on Taxol in September 2019 when her symptoms started.  But she was not on Taxol in September 2019 when she started noticing the visual changes so Taxol is not responsible for this.  The first dose of Taxol was on 11/5/2019.   Her vision is much better now.  She will continue to follow closely with ophthalmology.       Increased FDG ability in the colon.  She had FDG uptake in the cecum and ascending colon but no corresponding lesion was seen on the CT scan.  She is completely asymptomatic so at this time we will continue to observe.    Discussion regarding healthcare directives.  On previous visit she told me that she will bring the health care directive for our records but she forgot so I told her to bring it on next visit.      Breast implant removal.  She tells me that she was planning to do breast implant removal because there was a recall on this.  Her surgery was scheduled for 9/24/2019.  Because of this new development of metastatic breast cancer and her recent radiation, surgery has been canceled.  We discussed that at this time treatment for metastatic breast cancer would take precedence over the other surgery as the other surgery would require her to be off chemotherapy for several weeks and in that case the chances of cancer progression would be high and I would not recommend that.    Multiple sclerosis.  She will continue to follow with her neurologist   Quiles.  Currently multiple sclerosis is under control and currently she is not taking any medications.      I will see her back in 3 weeks.    All of her questions were answered to her satisfaction.  She is agreeable and comfortable with the plan.  Dewayne Zepeda MD    Video start time. 2:53 PM  Video stop time. 3:00 PM    I spent 30 minutes on this visit including the video time, reviewing records and labs and imaging and placing new orders as well as documentation.            Again, thank you for allowing me to participate in the care of your patient.        Sincerely,        Dewayne Zepeda MD

## 2021-01-19 NOTE — NURSING NOTE
"The patient has been notified of following:      \"This video visit will be conducted via a call between you and your physician/provider. We have found that certain health care needs can be provided without the need for an in-person physical exam.  This service lets us provide the care you need with a video conversation.  If a prescription is necessary we can send it directly to your pharmacy.  If lab work is needed we can place an order for that and you can then stop by our lab to have the test done at a later time.     Video visits are billed at different rates depending on your insurance coverage.  Please reach out to your insurance provider with any questions.     If during the course of the call the physician/provider feels a video visit is not appropriate, you will not be charged for this service.\"     Patient has given verbal consent for Video visit? Yes  How would you like to obtain your AVS? mychart  If you are dropped from the video visit, the video invite should be resent to: Text to cell phone: [phone]  Will anyone else be joining your video visit? no        Video-Visit Details     Type of service:  Video Visit    Originating Location (pt. Location): Home     Distant Location (provider location):  [facility]      Platform used for Video Visit: Amwell    SYMPTOM QUESTIONNAIRE    Pain: no    Nausea/Vomiting: nausea    Mouth Sores: no    Shortness of Breath: no    Smoking: no    Fever or Chills: no    Hard Stools: no    Soft Stools: no    Weight Loss: no    Weakness: yes- unchanged     Burning, numbness or tingling in hands or feet: mild    Problems with skin or swelling: no    Memory Loss: no    Anxiety or Depression: stable    Trouble Sleeping: no    Yuni Botello LPN on 1/19/2021 at 2:39 PM      "

## 2021-01-20 ENCOUNTER — INFUSION THERAPY VISIT (OUTPATIENT)
Dept: INFUSION THERAPY | Facility: CLINIC | Age: 61
End: 2021-01-20
Payer: COMMERCIAL

## 2021-01-20 ENCOUNTER — OFFICE VISIT (OUTPATIENT)
Dept: ONCOLOGY | Facility: CLINIC | Age: 61
End: 2021-01-20
Payer: COMMERCIAL

## 2021-01-20 VITALS
HEART RATE: 76 BPM | DIASTOLIC BLOOD PRESSURE: 83 MMHG | BODY MASS INDEX: 23.74 KG/M2 | WEIGHT: 129.8 LBS | SYSTOLIC BLOOD PRESSURE: 118 MMHG | RESPIRATION RATE: 16 BRPM | OXYGEN SATURATION: 100 % | TEMPERATURE: 98.2 F

## 2021-01-20 DIAGNOSIS — D70.1 CHEMOTHERAPY-INDUCED NEUTROPENIA (H): ICD-10-CM

## 2021-01-20 DIAGNOSIS — C79.51 BONE METASTASES: ICD-10-CM

## 2021-01-20 DIAGNOSIS — C50.919 METASTATIC BREAST CANCER: ICD-10-CM

## 2021-01-20 DIAGNOSIS — C50.912 BILATERAL MALIGNANT NEOPLASM OF BREAST IN FEMALE, UNSPECIFIED ESTROGEN RECEPTOR STATUS, UNSPECIFIED SITE OF BREAST (H): ICD-10-CM

## 2021-01-20 DIAGNOSIS — T45.1X5A CHEMOTHERAPY-INDUCED NEUTROPENIA (H): ICD-10-CM

## 2021-01-20 DIAGNOSIS — C50.911 BILATERAL MALIGNANT NEOPLASM OF BREAST IN FEMALE, UNSPECIFIED ESTROGEN RECEPTOR STATUS, UNSPECIFIED SITE OF BREAST (H): ICD-10-CM

## 2021-01-20 DIAGNOSIS — E87.6 HYPOKALEMIA: Primary | ICD-10-CM

## 2021-01-20 DIAGNOSIS — C79.51 BONE METASTASES: Primary | ICD-10-CM

## 2021-01-20 LAB
ALBUMIN SERPL-MCNC: 3.5 G/DL (ref 3.4–5)
ALP SERPL-CCNC: 66 U/L (ref 40–150)
ALT SERPL W P-5'-P-CCNC: 29 U/L (ref 0–50)
ANION GAP SERPL CALCULATED.3IONS-SCNC: 5 MMOL/L (ref 3–14)
AST SERPL W P-5'-P-CCNC: 19 U/L (ref 0–45)
BASOPHILS # BLD AUTO: 0 10E9/L (ref 0–0.2)
BASOPHILS NFR BLD AUTO: 0.6 %
BILIRUB SERPL-MCNC: 0.3 MG/DL (ref 0.2–1.3)
BUN SERPL-MCNC: 11 MG/DL (ref 7–30)
CALCIUM SERPL-MCNC: 8.9 MG/DL (ref 8.5–10.1)
CANCER AG27-29 SERPL-ACNC: 454 U/ML (ref 0–39)
CHLORIDE SERPL-SCNC: 107 MMOL/L (ref 94–109)
CO2 SERPL-SCNC: 28 MMOL/L (ref 20–32)
CREAT SERPL-MCNC: 0.6 MG/DL (ref 0.52–1.04)
DIFFERENTIAL METHOD BLD: ABNORMAL
EOSINOPHIL # BLD AUTO: 0.1 10E9/L (ref 0–0.7)
EOSINOPHIL NFR BLD AUTO: 2.9 %
ERYTHROCYTE [DISTWIDTH] IN BLOOD BY AUTOMATED COUNT: 15.4 % (ref 10–15)
GFR SERPL CREATININE-BSD FRML MDRD: >90 ML/MIN/{1.73_M2}
GLUCOSE SERPL-MCNC: 107 MG/DL (ref 70–99)
HCT VFR BLD AUTO: 40.1 % (ref 35–47)
HGB BLD-MCNC: 12.5 G/DL (ref 11.7–15.7)
IMM GRANULOCYTES # BLD: 0 10E9/L (ref 0–0.4)
IMM GRANULOCYTES NFR BLD: 0.6 %
LYMPHOCYTES # BLD AUTO: 0.6 10E9/L (ref 0.8–5.3)
LYMPHOCYTES NFR BLD AUTO: 17.5 %
MAGNESIUM SERPL-MCNC: 2.1 MG/DL (ref 1.6–2.3)
MCH RBC QN AUTO: 27.4 PG (ref 26.5–33)
MCHC RBC AUTO-ENTMCNC: 31.2 G/DL (ref 31.5–36.5)
MCV RBC AUTO: 88 FL (ref 78–100)
MONOCYTES # BLD AUTO: 0.4 10E9/L (ref 0–1.3)
MONOCYTES NFR BLD AUTO: 12.7 %
NEUTROPHILS # BLD AUTO: 2.1 10E9/L (ref 1.6–8.3)
NEUTROPHILS NFR BLD AUTO: 65.7 %
PHOSPHATE SERPL-MCNC: 2.4 MG/DL (ref 2.5–4.5)
PLATELET # BLD AUTO: 176 10E9/L (ref 150–450)
POTASSIUM SERPL-SCNC: 4.1 MMOL/L (ref 3.4–5.3)
PROT SERPL-MCNC: 6.7 G/DL (ref 6.8–8.8)
RBC # BLD AUTO: 4.57 10E12/L (ref 3.8–5.2)
SODIUM SERPL-SCNC: 140 MMOL/L (ref 133–144)
WBC # BLD AUTO: 3.1 10E9/L (ref 4–11)

## 2021-01-20 PROCEDURE — 80053 COMPREHEN METABOLIC PANEL: CPT | Performed by: INTERNAL MEDICINE

## 2021-01-20 PROCEDURE — 96375 TX/PRO/DX INJ NEW DRUG ADDON: CPT | Performed by: INTERNAL MEDICINE

## 2021-01-20 PROCEDURE — 96372 THER/PROPH/DIAG INJ SC/IM: CPT | Mod: 59 | Performed by: INTERNAL MEDICINE

## 2021-01-20 PROCEDURE — 96413 CHEMO IV INFUSION 1 HR: CPT | Performed by: INTERNAL MEDICINE

## 2021-01-20 PROCEDURE — 99207 PR NO CHARGE LOS: CPT

## 2021-01-20 PROCEDURE — 86300 IMMUNOASSAY TUMOR CA 15-3: CPT | Performed by: INTERNAL MEDICINE

## 2021-01-20 PROCEDURE — 85025 COMPLETE CBC W/AUTO DIFF WBC: CPT | Performed by: INTERNAL MEDICINE

## 2021-01-20 PROCEDURE — 83735 ASSAY OF MAGNESIUM: CPT | Performed by: INTERNAL MEDICINE

## 2021-01-20 PROCEDURE — S0028 INJECTION, FAMOTIDINE, 20 MG: HCPCS | Performed by: INTERNAL MEDICINE

## 2021-01-20 PROCEDURE — 96367 TX/PROPH/DG ADDL SEQ IV INF: CPT | Performed by: INTERNAL MEDICINE

## 2021-01-20 PROCEDURE — 84100 ASSAY OF PHOSPHORUS: CPT | Performed by: INTERNAL MEDICINE

## 2021-01-20 RX ORDER — HEPARIN SODIUM (PORCINE) LOCK FLUSH IV SOLN 100 UNIT/ML 100 UNIT/ML
500 SOLUTION INTRAVENOUS DAILY PRN
Status: DISCONTINUED | OUTPATIENT
Start: 2021-01-20 | End: 2021-01-20 | Stop reason: HOSPADM

## 2021-01-20 RX ORDER — DIPHENHYDRAMINE HCL 25 MG
50 CAPSULE ORAL ONCE
Status: COMPLETED | OUTPATIENT
Start: 2021-01-20 | End: 2021-01-20

## 2021-01-20 RX ORDER — HEPARIN SODIUM (PORCINE) LOCK FLUSH IV SOLN 100 UNIT/ML 100 UNIT/ML
5 SOLUTION INTRAVENOUS
Status: DISCONTINUED | OUTPATIENT
Start: 2021-01-20 | End: 2021-01-20 | Stop reason: HOSPADM

## 2021-01-20 RX ORDER — ACETAMINOPHEN 325 MG/1
650 TABLET ORAL ONCE
Status: COMPLETED | OUTPATIENT
Start: 2021-01-20 | End: 2021-01-20

## 2021-01-20 RX ADMIN — Medication 500 ML: at 13:23

## 2021-01-20 RX ADMIN — ACETAMINOPHEN 650 MG: 325 TABLET ORAL at 13:22

## 2021-01-20 RX ADMIN — HEPARIN SODIUM (PORCINE) LOCK FLUSH IV SOLN 100 UNIT/ML 5 ML: 100 SOLUTION at 16:30

## 2021-01-20 RX ADMIN — Medication 50 MG: at 13:22

## 2021-01-20 RX ADMIN — HEPARIN SODIUM (PORCINE) LOCK FLUSH IV SOLN 100 UNIT/ML 500 UNITS: 100 SOLUTION at 12:43

## 2021-01-20 ASSESSMENT — PAIN SCALES - GENERAL: PAINLEVEL: NO PAIN (0)

## 2021-01-20 NOTE — PROGRESS NOTES
Infusion Nursing Note:  Shaneka Alvarez presents today for C4 D1 Trodelvy + xgeva.    Patient seen by provider today: No   present during visit today: Not Applicable.    Note:    .  Patient  Did meet criteria for an asymptomatic covid-19 PCR test in infusion today. Patient declined the covid-19 test.    Intravenous Access:  Implanted Port.    Treatment Conditions:  Lab Results   Component Value Date    HGB 12.5 01/20/2021     Lab Results   Component Value Date    WBC 3.1 01/20/2021      Lab Results   Component Value Date    ANEU 2.1 01/20/2021     Lab Results   Component Value Date     01/20/2021      Lab Results   Component Value Date     01/20/2021                   Lab Results   Component Value Date    POTASSIUM 4.1 01/20/2021           Lab Results   Component Value Date    MAG 2.1 01/20/2021            Lab Results   Component Value Date    CR 0.60 01/20/2021                   Lab Results   Component Value Date    RAFAELA 8.9 01/20/2021                Lab Results   Component Value Date    BILITOTAL 0.3 01/20/2021           Lab Results   Component Value Date    ALBUMIN 3.5 01/20/2021                    Lab Results   Component Value Date    ALT 29 01/20/2021           Lab Results   Component Value Date    AST 19 01/20/2021       Results reviewed, labs MET treatment parameters, ok to proceed with treatment.      Post Infusion Assessment:  Patient tolerated infusion without incident.  Patient tolerated injection without incident.  Patient observed for 30 minutes post Trodelvy per protocol.  Site patent and intact, free from redness, edema or discomfort.  No evidence of extravasations.  Access discontinued per protocol.       Discharge Plan:   Discharge instructions reviewed with: Patient.  Patient and/or family verbalized understanding of discharge instructions and all questions answered.  Patient discharged in stable condition accompanied by: self.  Departure Mode: Ambulatory.    Yanci GODOY  TERRI Kate

## 2021-01-20 NOTE — PROGRESS NOTES
Port accessed using 20 G 3/4 inch needle.  Labs collected from port.  Line flushed with NS and Heparin.  Pt tolerated procedure.  Port left accessed for infusion.    Joanne Ludwig RN-BSN, PHN, OCN  [facility]

## 2021-01-21 ENCOUNTER — TELEPHONE (OUTPATIENT)
Dept: OPHTHALMOLOGY | Facility: CLINIC | Age: 61
End: 2021-01-21

## 2021-01-25 ENCOUNTER — OFFICE VISIT (OUTPATIENT)
Dept: OPHTHALMOLOGY | Facility: CLINIC | Age: 61
End: 2021-01-25
Attending: INTERNAL MEDICINE
Payer: COMMERCIAL

## 2021-01-25 DIAGNOSIS — H35.353 CYSTOID MACULAR EDEMA OF BOTH EYES: Primary | ICD-10-CM

## 2021-01-25 PROCEDURE — 99203 OFFICE O/P NEW LOW 30 MIN: CPT | Mod: GC | Performed by: OPHTHALMOLOGY

## 2021-01-25 PROCEDURE — 92134 CPTRZ OPH DX IMG PST SGM RTA: CPT | Performed by: OPHTHALMOLOGY

## 2021-01-25 PROCEDURE — G0463 HOSPITAL OUTPT CLINIC VISIT: HCPCS

## 2021-01-25 ASSESSMENT — REFRACTION_WEARINGRX
OD_CYLINDER: +2.50
OS_AXIS: 098
OS_CYLINDER: +1.25
OS_SPHERE: -2.25
OD_SPHERE: -3.25
OD_AXIS: 066

## 2021-01-25 ASSESSMENT — SLIT LAMP EXAM - LIDS
COMMENTS: NORMAL
COMMENTS: NORMAL

## 2021-01-25 ASSESSMENT — TONOMETRY
IOP_METHOD: TONOPEN
OS_IOP_MMHG: 16
OD_IOP_MMHG: 16

## 2021-01-25 ASSESSMENT — PATIENT HEALTH QUESTIONNAIRE - PHQ9: SUM OF ALL RESPONSES TO PHQ QUESTIONS 1-9: 0

## 2021-01-25 ASSESSMENT — CONF VISUAL FIELD
OD_NORMAL: 1
METHOD: COUNTING FINGERS

## 2021-01-25 ASSESSMENT — VISUAL ACUITY
OD_CC+: -1
OD_CC: 20/100
METHOD: SNELLEN - LINEAR
OD_PH_CC: 20/70
OS_CC: 20/80

## 2021-01-25 NOTE — PROGRESS NOTES
CC -   Initial visit, history of CME    INTERVAL HISTORY - Initial visit    PMH -   Shaneka Alvarez is a  60 year old year-old patient with history of breast cancer on chemotherapy and history of CME s/p receiving   with decreased vision OU   Diagnosed with breast cancer 2005, metastasis 9/2020, started on chemotherapy Xeloda and experienced decreased in vision OU. Seen by Clara at [facility] and was receiving intravitreal injections [id number].   Received radiation T12-L5 3000 cGy in 10 fractions from 9/6/2019 till 9/18/2019.      PAST OCULAR SURGERY      RETINAL IMAGING:  OCT   OD - hazy, ERM, irregular foveal contour, no CME  OS - hazy, ERM, normal foveal contour, no CME      ASSESSMENT & PLAN    #History of CME  - per patient, CME s/p Xeloda    #Cataracts OU   - Dense PSC with posterior synechaie   - Refer to Dr. Barkley for cataract surgery soonest available  - RTC 3-4 months s/p cataract surgery           return to clinic: 304 months for Exam/OCT both eyes     Boaz Grady PGY3  [facility]       ATTESTATION:    I have seen and examined the patient with Dr. Grady and agree with the findings in this note,as well as the interpretations of the diagnostic tests.    Ralph Sexton MD PhD.  Professor & Chair      ATTESTATION     Attending Attestation:

## 2021-01-25 NOTE — NURSING NOTE
Chief Complaints and History of Present Illnesses   Patient presents with     Cystoid Macular Edema Evaluation     Chief Complaint(s) and History of Present Illness(es)     Cystoid Macular Edema Evaluation     Laterality: both eyes    Associated symptoms: floaters, dryness, tearing and itching.  Negative for eye pain, flashes and discharge    Pain scale: 0/10              Comments     Shaneka is here, referred by Dr Dewayne Zepeda for CME.Dr Zepeda is Shaneka's Oncologist. She says this started about a year ago. She has seen another Ophthalogist but he left his practice, so she came here.     Romario Barba COT 3:32 PM January 25, 2021

## 2021-01-26 ENCOUNTER — INFUSION THERAPY VISIT (OUTPATIENT)
Dept: INFUSION THERAPY | Facility: CLINIC | Age: 61
End: 2021-01-26
Payer: COMMERCIAL

## 2021-01-26 ENCOUNTER — OFFICE VISIT (OUTPATIENT)
Dept: ONCOLOGY | Facility: CLINIC | Age: 61
End: 2021-01-26
Payer: COMMERCIAL

## 2021-01-26 VITALS
DIASTOLIC BLOOD PRESSURE: 78 MMHG | OXYGEN SATURATION: 99 % | HEART RATE: 82 BPM | BODY MASS INDEX: 23.41 KG/M2 | SYSTOLIC BLOOD PRESSURE: 112 MMHG | RESPIRATION RATE: 16 BRPM | TEMPERATURE: 97.2 F | WEIGHT: 128 LBS

## 2021-01-26 DIAGNOSIS — C79.51 BONE METASTASES: ICD-10-CM

## 2021-01-26 DIAGNOSIS — C50.912 BILATERAL MALIGNANT NEOPLASM OF BREAST IN FEMALE, UNSPECIFIED ESTROGEN RECEPTOR STATUS, UNSPECIFIED SITE OF BREAST (H): ICD-10-CM

## 2021-01-26 DIAGNOSIS — E87.6 HYPOKALEMIA: Primary | ICD-10-CM

## 2021-01-26 DIAGNOSIS — C50.919 METASTATIC BREAST CANCER: ICD-10-CM

## 2021-01-26 DIAGNOSIS — C50.919 METASTATIC BREAST CANCER: Primary | ICD-10-CM

## 2021-01-26 DIAGNOSIS — T45.1X5A CHEMOTHERAPY-INDUCED NEUTROPENIA (H): ICD-10-CM

## 2021-01-26 DIAGNOSIS — C50.911 BILATERAL MALIGNANT NEOPLASM OF BREAST IN FEMALE, UNSPECIFIED ESTROGEN RECEPTOR STATUS, UNSPECIFIED SITE OF BREAST (H): ICD-10-CM

## 2021-01-26 DIAGNOSIS — D70.1 CHEMOTHERAPY-INDUCED NEUTROPENIA (H): ICD-10-CM

## 2021-01-26 LAB
ALBUMIN SERPL-MCNC: 3.7 G/DL (ref 3.4–5)
ALP SERPL-CCNC: 60 U/L (ref 40–150)
ALT SERPL W P-5'-P-CCNC: 31 U/L (ref 0–50)
ANION GAP SERPL CALCULATED.3IONS-SCNC: 6 MMOL/L (ref 3–14)
AST SERPL W P-5'-P-CCNC: 14 U/L (ref 0–45)
BASOPHILS # BLD AUTO: 0 10E9/L (ref 0–0.2)
BASOPHILS NFR BLD AUTO: 1.2 %
BILIRUB SERPL-MCNC: 0.3 MG/DL (ref 0.2–1.3)
BUN SERPL-MCNC: 23 MG/DL (ref 7–30)
CALCIUM SERPL-MCNC: 9.4 MG/DL (ref 8.5–10.1)
CHLORIDE SERPL-SCNC: 102 MMOL/L (ref 94–109)
CO2 SERPL-SCNC: 30 MMOL/L (ref 20–32)
CREAT SERPL-MCNC: 0.62 MG/DL (ref 0.52–1.04)
DIFFERENTIAL METHOD BLD: ABNORMAL
EOSINOPHIL # BLD AUTO: 0.1 10E9/L (ref 0–0.7)
EOSINOPHIL NFR BLD AUTO: 2.1 %
ERYTHROCYTE [DISTWIDTH] IN BLOOD BY AUTOMATED COUNT: 14.9 % (ref 10–15)
GFR SERPL CREATININE-BSD FRML MDRD: >90 ML/MIN/{1.73_M2}
GLUCOSE SERPL-MCNC: 112 MG/DL (ref 70–99)
HCT VFR BLD AUTO: 37.4 % (ref 35–47)
HGB BLD-MCNC: 12.1 G/DL (ref 11.7–15.7)
IMM GRANULOCYTES # BLD: 0 10E9/L (ref 0–0.4)
IMM GRANULOCYTES NFR BLD: 1.2 %
LYMPHOCYTES # BLD AUTO: 0.7 10E9/L (ref 0.8–5.3)
LYMPHOCYTES NFR BLD AUTO: 19.1 %
MAGNESIUM SERPL-MCNC: 2.2 MG/DL (ref 1.6–2.3)
MCH RBC QN AUTO: 27.6 PG (ref 26.5–33)
MCHC RBC AUTO-ENTMCNC: 32.4 G/DL (ref 31.5–36.5)
MCV RBC AUTO: 85 FL (ref 78–100)
MONOCYTES # BLD AUTO: 0.3 10E9/L (ref 0–1.3)
MONOCYTES NFR BLD AUTO: 10 %
NEUTROPHILS # BLD AUTO: 2.3 10E9/L (ref 1.6–8.3)
NEUTROPHILS NFR BLD AUTO: 66.4 %
PHOSPHATE SERPL-MCNC: 4.5 MG/DL (ref 2.5–4.5)
PLATELET # BLD AUTO: 213 10E9/L (ref 150–450)
POTASSIUM SERPL-SCNC: 4.3 MMOL/L (ref 3.4–5.3)
PROT SERPL-MCNC: 6.7 G/DL (ref 6.8–8.8)
RBC # BLD AUTO: 4.38 10E12/L (ref 3.8–5.2)
SODIUM SERPL-SCNC: 138 MMOL/L (ref 133–144)
WBC # BLD AUTO: 3.4 10E9/L (ref 4–11)

## 2021-01-26 PROCEDURE — 96413 CHEMO IV INFUSION 1 HR: CPT | Performed by: NURSE PRACTITIONER

## 2021-01-26 PROCEDURE — 80053 COMPREHEN METABOLIC PANEL: CPT | Performed by: INTERNAL MEDICINE

## 2021-01-26 PROCEDURE — 85025 COMPLETE CBC W/AUTO DIFF WBC: CPT | Performed by: INTERNAL MEDICINE

## 2021-01-26 PROCEDURE — 83735 ASSAY OF MAGNESIUM: CPT | Performed by: INTERNAL MEDICINE

## 2021-01-26 PROCEDURE — 99207 PR NO CHARGE NURSE ONLY: CPT

## 2021-01-26 PROCEDURE — S0028 INJECTION, FAMOTIDINE, 20 MG: HCPCS | Performed by: NURSE PRACTITIONER

## 2021-01-26 PROCEDURE — 96367 TX/PROPH/DG ADDL SEQ IV INF: CPT | Performed by: NURSE PRACTITIONER

## 2021-01-26 PROCEDURE — 84100 ASSAY OF PHOSPHORUS: CPT | Performed by: INTERNAL MEDICINE

## 2021-01-26 PROCEDURE — 96375 TX/PRO/DX INJ NEW DRUG ADDON: CPT | Performed by: NURSE PRACTITIONER

## 2021-01-26 PROCEDURE — 99207 PR NO CHARGE LOS: CPT

## 2021-01-26 RX ORDER — HEPARIN SODIUM (PORCINE) LOCK FLUSH IV SOLN 100 UNIT/ML 100 UNIT/ML
5 SOLUTION INTRAVENOUS EVERY 8 HOURS
Status: DISCONTINUED | OUTPATIENT
Start: 2021-01-26 | End: 2021-01-28 | Stop reason: HOSPADM

## 2021-01-26 RX ORDER — ACETAMINOPHEN 325 MG/1
650 TABLET ORAL ONCE
Status: COMPLETED | OUTPATIENT
Start: 2021-01-26 | End: 2021-01-26

## 2021-01-26 RX ORDER — DIPHENHYDRAMINE HCL 25 MG
50 CAPSULE ORAL ONCE
Status: COMPLETED | OUTPATIENT
Start: 2021-01-26 | End: 2021-01-26

## 2021-01-26 RX ORDER — HEPARIN SODIUM (PORCINE) LOCK FLUSH IV SOLN 100 UNIT/ML 100 UNIT/ML
5 SOLUTION INTRAVENOUS
Status: DISCONTINUED | OUTPATIENT
Start: 2021-01-26 | End: 2021-01-26 | Stop reason: HOSPADM

## 2021-01-26 RX ADMIN — ACETAMINOPHEN 650 MG: 325 TABLET ORAL at 12:30

## 2021-01-26 RX ADMIN — Medication 500 ML: at 12:37

## 2021-01-26 RX ADMIN — Medication 50 MG: at 12:30

## 2021-01-26 ASSESSMENT — PAIN SCALES - GENERAL: PAINLEVEL: NO PAIN (0)

## 2021-01-26 NOTE — PROGRESS NOTES
Infusion Nursing Note:  Shaneka Alvarez presents today for C4D8 Trodelvy.    Patient seen by provider today: No   present during visit today: Not Applicable.    Note: N/A.    Intravenous Access:  Implanted Port.    Treatment Conditions:  Lab Results   Component Value Date    HGB 12.1 01/26/2021     Lab Results   Component Value Date    WBC 3.4 01/26/2021      Lab Results   Component Value Date    ANEU 2.3 01/26/2021     Lab Results   Component Value Date     01/26/2021      Lab Results   Component Value Date     01/26/2021                   Lab Results   Component Value Date    POTASSIUM 4.3 01/26/2021           Lab Results   Component Value Date    MAG 2.2 01/26/2021            Lab Results   Component Value Date    CR 0.62 01/26/2021                   Lab Results   Component Value Date    RAFAELA 9.4 01/26/2021                Lab Results   Component Value Date    BILITOTAL 0.3 01/26/2021           Lab Results   Component Value Date    ALBUMIN 3.7 01/26/2021                    Lab Results   Component Value Date    ALT 31 01/26/2021           Lab Results   Component Value Date    AST 14 01/26/2021       Results reviewed, labs MET treatment parameters, ok to proceed with treatment.    Post Infusion Assessment:  Patient tolerated infusion without incident.  Patient observed for 30 minutes post Trodelvy per protocol.  Blood return noted pre and post infusion.  Site patent and intact, free from redness, edema or discomfort.  No evidence of extravasations.  Access discontinued per protocol.       Discharge Plan:   Patient will return 2/10/2021 for next appointment.   Patient discharged in stable condition accompanied by: self.  Departure Mode: Ambulatory.    Joanne Ludwig RN-BSN, PHN, OCN  [facility]

## 2021-01-26 NOTE — PROGRESS NOTES
SPIRITUAL HEALTH SERVICES Progress Note  [facility]    Visited Shaneka RICKY Alvarez in the infusion center for ongoing emotional/spiritual support.  Offered reflective conversation, support and affirmation as she shared her journey.       Illness Narrative - Patient continues to receive treatment for metastatic breast cancer.  She states that her cancer had progressed as she expected last fall and she was started on a new regiment but had to be hospitalized at times with side effects of nausea.  She states that now the medication has been adjusted and she is tolerating this better.        Concerns - Patient discussed with me the possibility of seeking a second opinion in [place] where her daughter and son live.  She is also thinking that she may want to spend more time there with her grandkids. Still working the idea in her own mind. Family is supportive.        Coping - Patient is looking forward to a trip to [place] this week.       Meaning-Making - Family is important to her.       Plan - Patient  knows that she can request a Spiritual Health encounter as needed.     Gina Pineda  Staff

## 2021-01-28 ENCOUNTER — OFFICE VISIT (OUTPATIENT)
Dept: OPHTHALMOLOGY | Facility: CLINIC | Age: 61
End: 2021-01-28
Attending: OPHTHALMOLOGY
Payer: COMMERCIAL

## 2021-01-28 DIAGNOSIS — H25.043 POSTERIOR SUBCAPSULAR POLAR AGE-RELATED CATARACT OF BOTH EYES: Primary | ICD-10-CM

## 2021-01-28 DIAGNOSIS — H30.23 INTERMEDIATE UVEITIS OF BOTH EYES: ICD-10-CM

## 2021-01-28 DIAGNOSIS — H35.353 CYSTOID MACULAR EDEMA OF BOTH EYES: ICD-10-CM

## 2021-01-28 DIAGNOSIS — C50.919 METASTATIC BREAST CANCER: ICD-10-CM

## 2021-01-28 DIAGNOSIS — H21.503: ICD-10-CM

## 2021-01-28 DIAGNOSIS — H35.373 EPIRETINAL MEMBRANE (ERM) OF BOTH EYES: ICD-10-CM

## 2021-01-28 PROCEDURE — 99214 OFFICE O/P EST MOD 30 MIN: CPT | Mod: GC | Performed by: OPHTHALMOLOGY

## 2021-01-28 PROCEDURE — 92015 DETERMINE REFRACTIVE STATE: CPT

## 2021-01-28 PROCEDURE — 76519 ECHO EXAM OF EYE: CPT | Performed by: OPHTHALMOLOGY

## 2021-01-28 PROCEDURE — 92025 CPTRIZED CORNEAL TOPOGRAPHY: CPT | Performed by: OPHTHALMOLOGY

## 2021-01-28 PROCEDURE — G0463 HOSPITAL OUTPT CLINIC VISIT: HCPCS

## 2021-01-28 ASSESSMENT — REFRACTION_WEARINGRX
OS_AXIS: 100
OS_CYLINDER: +1.25
OD_CYLINDER: +2.50
OD_AXIS: 065
OD_SPHERE: -3.25
OS_SPHERE: -2.25

## 2021-01-28 ASSESSMENT — TONOMETRY
IOP_METHOD: TONOPEN
OD_IOP_MMHG: 19
OS_IOP_MMHG: 22

## 2021-01-28 ASSESSMENT — VISUAL ACUITY
METHOD: SNELLEN - LINEAR
OS_PH_CC: 20/70
CORRECTION_TYPE: GLASSES
OS_CC: 20/100
OD_CC: 20/80

## 2021-01-28 ASSESSMENT — REFRACTION_MANIFEST
OD_AXIS: 075
OS_SPHERE: -2.50
OD_ADD: +2.50
OS_AXIS: 100
OS_CYLINDER: +1.25
OD_SPHERE: -3.00
OS_ADD: +2.50
OD_CYLINDER: +2.25

## 2021-01-28 ASSESSMENT — CONF VISUAL FIELD
METHOD: COUNTING FINGERS
OD_NORMAL: 1
OS_NORMAL: 1

## 2021-01-28 ASSESSMENT — CUP TO DISC RATIO
OS_RATIO: 0.4
OD_RATIO: 0.4

## 2021-01-28 NOTE — PROGRESS NOTES
Chief Complaint(s) and History of Present Illness(es)     Cataract     Laterality: both eyes    Associated symptoms: blurred vision, glare and a need for brighter lights      Severity: moderate    Duration: months          Comments     +watery  +float  Yusra Mancia COT 8:26 AM January 28, 2021      HPI  Ms. Alvarez is a 60 year old woman here on referral from Dr. Hu for cataract evaluation.     Patient has been on  Chemotherapy with recurrence of disease for breast cancer this pasta year. Started back on chemotherapy.   Since then had progressive decrease in vision in both eyes.  Experiences bilateral eye redness, pain, decrease vision. Had CME and was followed at [facility] and was receiving intravitreal injections. Now here at [facility],improvement the CME.      [facility] -   Shaneka Alvarez is a  60 year old year-old patient with history of breast cancer on chemotherapy and history of CME s/p receiving   with decreased vision OU   Diagnosed with breast cancer 2005, metastasis 9/2020, started on chemotherapy Xeloda and experienced decreased in vision OU. Seen by Clara at [facility] and was receiving intravitreal injections [id number].   Received radiation T12-L5 3000 cGy in 10 fractions from 9/6/2019 till 9/18/201     Review of systems for the eyes was negative other than the pertinent positives/negatives listed in the HPI.      Assessment & Plan      Shaneka Alvarez is a 60 year old female with the following diagnoses:   1. Posterior subcapsular polar age-related cataract of both eyes    2. Cystoid macular edema of both eyes    3. Epiretinal membrane (ERM) of both eyes    4. Iris synechiae, bilateral    5. Intermediate uveitis of both eyes    6. Metastatic breast cancer (H)       Uveitis + CME s/p Xeloda   S/P PTSK in Nov in both eyes (VRS) - reviewed outside records   Reviewed records from oncology today    Dense PSC with posterior synechia in both eyes   Cataract, eye LEFT > RIGHT eye   Visually significant both eyes  BOTH  Risks, benefits and alternatives to cataract extraction/IOL implantation discussed; consent obtained.  Will schedule surgery today     Special equipment/needs:     Anesthesia:Topical  Dilation:Good  Iris expansion: May be needed, after synechiolysis   Pseudoexfoliation: No pseudoexfoliation  Trypan Blue: Yes, possible   LEFT eye first  Visually significant astigmatism   Discussed surgical corrective options  Reviewed elective nature of surgical correction and patient responsible fee associated  The patient wishes to proceed with toric Intraocular lens (resident)  Goal -1.50 left eye; possible mini-mono right eye pending outcome  Dex  Discussed possible limitations in final visual acuity due to uveitis and macular sequale, expressed understanding    Kellie Morales MD PGY2  Department of Ophthalmology        Attending Physician Attestation:  Complete documentation of historical and exam elements from today's encounter can be found in the full encounter summary report (not reduplicated in this progress note).  I personally obtained the chief complaint(s) and history of present illness.  I confirmed and edited as necessary the review of systems, past medical/surgical history, family history, social history, and examination findings as documented by others; and I examined the patient myself.  I personally reviewed the relevant tests, images, and reports as documented above.  I formulated and edited as necessary the assessment and plan and discussed the findings and management plan with the patient and family. Attending Physician Image/Tesing Attestation: I personally reviewed the ophthalmic test(s) associated with this encounter, agree with the interpretation(s) as documented by the resident/fellow, and have edited the corresponding report(s) as necessary.  . - Luis Barkley MD

## 2021-01-28 NOTE — LETTER
1/28/2021       RE: Shaneka Alvarez  [id number] [address] [facility] [id number]     Dear Colleague,    Thank you for referring your patient, Shaneka Alvarez, to the [facility] at [facility]. Please see a copy of my visit note below.    Chief Complaint(s) and History of Present Illness(es)     Cataract     Laterality: both eyes    Associated symptoms: blurred vision, glare and a need for brighter lights      Severity: moderate    Duration: months          Comments     +watery  +float  Yusra Bishnuon COT 8:26 AM January 28, 2021      HPI  Ms. Alvarez is a 60 year old woman here on referral from Dr. Hu for cataract evaluation.     Patient has been on  Chemotherapy with recurrence of disease for breast cancer this pasta year. Started back on chemotherapy.   Since then had progressive decrease in vision in both eyes.  Experiences bilateral eye redness, pain, decrease vision. Had CME and was followed at [facility] and was receiving intravitreal injections. Now here at [facility],improvement the CME.      PMH -   Shaneka Alvarez is a  60 year old year-old patient with history of breast cancer on chemotherapy and history of CME s/p receiving   with decreased vision OU   Diagnosed with breast cancer 2005, metastasis 9/2020, started on chemotherapy Xeloda and experienced decreased in vision OU. Seen by Clara at [facility] and was receiving intravitreal injections [id number].   Received radiation T12-L5 3000 cGy in 10 fractions from 9/6/2019 till 9/18/201     Review of systems for the eyes was negative other than the pertinent positives/negatives listed in the HPI.      Assessment & Plan      Shaneka Alvarez is a 60 year old female with the following diagnoses:   1. Posterior subcapsular polar age-related cataract of both eyes    2. Cystoid macular edema of both eyes    3. Epiretinal membrane (ERM) of both eyes    4. Iris synechiae, bilateral    5. Intermediate uveitis of both  eyes    6. Metastatic breast cancer (H)       Uveitis + CME s/p Xeloda   S/P PTSK in Nov in both eyes (VRS) - reviewed outside records   Reviewed records from oncology today    Dense PSC with posterior synechia in both eyes   Cataract, eye LEFT > RIGHT eye   Visually significant both eyes BOTH  Risks, benefits and alternatives to cataract extraction/IOL implantation discussed; consent obtained.  Will schedule surgery today     Special equipment/needs:     Anesthesia:Topical  Dilation:Good  Iris expansion: May be needed, after synechiolysis   Pseudoexfoliation: No pseudoexfoliation  Trypan Blue: Yes, possible   LEFT eye first  Visually significant astigmatism   Discussed surgical corrective options  Reviewed elective nature of surgical correction and patient responsible fee associated  The patient wishes to proceed with toric Intraocular lens (resident)  Goal -1.50 left eye; possible mini-mono right eye pending outcome  Dex  Discussed possible limitations in final visual acuity due to uveitis and macular sequale, expressed understanding    Kellie Morales MD PGY2  Department of Ophthalmology        Attending Physician Attestation:  Complete documentation of historical and exam elements from today's encounter can be found in the full encounter summary report (not reduplicated in this progress note).  I personally obtained the chief complaint(s) and history of present illness.  I confirmed and edited as necessary the review of systems, past medical/surgical history, family history, social history, and examination findings as documented by others; and I examined the patient myself.  I personally reviewed the relevant tests, images, and reports as documented above.  I formulated and edited as necessary the assessment and plan and discussed the findings and management plan with the patient and family. Attending Physician Image/Tesing Attestation: I personally reviewed the ophthalmic test(s) associated with this  encounter, agree with the interpretation(s) as documented by the resident/fellow, and have edited the corresponding report(s) as necessary.  . - Luis Barkley MD         Again, thank you for allowing me to participate in the care of your patient.      Sincerely,    Luis Barkley MD

## 2021-01-29 ENCOUNTER — TELEPHONE (OUTPATIENT)
Dept: OPHTHALMOLOGY | Facility: CLINIC | Age: 61
End: 2021-01-29

## 2021-01-29 DIAGNOSIS — Z11.59 ENCOUNTER FOR SCREENING FOR OTHER VIRAL DISEASES: Primary | ICD-10-CM

## 2021-01-29 PROBLEM — H21.503: Status: ACTIVE | Noted: 2021-01-29

## 2021-01-29 PROBLEM — H25.043 POSTERIOR SUBCAPSULAR POLAR AGE-RELATED CATARACT OF BOTH EYES: Status: ACTIVE | Noted: 2021-01-29

## 2021-01-29 NOTE — TELEPHONE ENCOUNTER
FUTURE VISIT INFORMATION      SURGERY INFORMATION:    Date: 3.8.21    Location: [facility]     Surgeon:  Luis Barkley     Anesthesia Type:  Combined MAC with Topical     Procedure: PHACOEMULSIFICATION, CATARACT, WITH INTRAOCULAR LENS IMPLANT, TORIC LENS RIGHT    Consult: 21    RECORDS REQUESTED FROM:       Primary Care Provider: Mohit Barcenas     Most recent EKG+ Tracin.15.21    Most recent ECHO: 3.13.07

## 2021-01-29 NOTE — TELEPHONE ENCOUNTER
Spoke with patient to schedule left eye surgery with Dr. Barkley    Surgery was scheduled on 2/8 at [facility]  Patient will have H&P at [facility]     Patient is aware a COVID-19 test is needed before their procedure. The test should be with-in 4 days of their procedure.   Test Details: Date 2/5 Location [facility] lab    Post-Op visit was scheduled on 2/25  Patient was advised a / is needed day of surgery. As well as, for 24 hours after their surgery procedure.  Surgery packet was sent via [employer], patient has my direct contact information for any further questions [phone].

## 2021-01-29 NOTE — TELEPHONE ENCOUNTER
Spoke with patient to schedule right eye surgery with Dr. Barkley    Surgery was scheduled on 3/8 at [facility]  Patient will have H&P at [facility]     Patient is aware a COVID-19 test is needed before their procedure. The test should be with-in 4 days of their procedure.   Test Details: Date 3/4 Location [facility] LAB    Post-Op visit was scheduled on 3/25  Patient was advised a / is needed day of surgery. As well as, for 24 hours after their surgery procedure.  Surgery packet was sent via [employer] patient has my direct contact information for any further questions [phone].

## 2021-02-04 NOTE — PHARMACY - PREOPERATIVE ASSESSMENT CENTER
Anticoagulation Note - Preoperative Assessment Center (PAC) Pharmacist     The purpose of this note is to document the perioperative anticoagulation plan outlined by the providers caring for Shaneka Alvarez.     Current Regimen  Anticoagulation Regimen as of February 4, 2021: Apixaban (Eliquis) 5 mg by mouth twice a day  Indication: Pulmonary embolism bilateral, incidentally found on scan from 7/17/2020  Prescriber:  Annie Bailon NP ([facility])    Creatinine   Date Value Ref Range Status   01/26/2021 0.62 0.52 - 1.04 mg/dL Final        Perioperative plan  Shaneka Alvarez is scheduled for cataract surgery on the left eye on 2/8/2021 and on the right eye on 3/8/2021 with Dr. Barkley and the perioperative anticoagulation plan outlined by Dr. Barkley is continue on apixaban uninterrupted.    Fadi Moe RPH  February 4, 2021  11:00 AM            Luis Barkley MD [place], Corey J [facility]; Carmen Dela Cruz PA-C             Yes, ok to continue for the upcoming cataract surgeries.  Thanks    Previous Messages    ----- Message -----   From: Fadi Moe RPH   Sent: 2/1/2021   9:27 AM CST   To: Carmen Dela Cruz PA-C, *   Subject: another quick anticoag question                   Hi Dr. Barkley,   This pt is a bit more straightforward.  She is on eliquis for BL PE that were incidentally found in July 2020. Can she remain on this for her upcoming cataract surgeries on 2/8 and 3/8?   Regards,   Fadi

## 2021-02-05 ENCOUNTER — VIRTUAL VISIT (OUTPATIENT)
Dept: SURGERY | Facility: CLINIC | Age: 61
End: 2021-02-05
Payer: COMMERCIAL

## 2021-02-05 ENCOUNTER — ANCILLARY PROCEDURE (OUTPATIENT)
Dept: PET IMAGING | Facility: CLINIC | Age: 61
End: 2021-02-05
Attending: INTERNAL MEDICINE
Payer: COMMERCIAL

## 2021-02-05 ENCOUNTER — PRE VISIT (OUTPATIENT)
Dept: SURGERY | Facility: CLINIC | Age: 61
End: 2021-02-05

## 2021-02-05 ENCOUNTER — ANESTHESIA EVENT (OUTPATIENT)
Dept: SURGERY | Facility: AMBULATORY SURGERY CENTER | Age: 61
End: 2021-02-05

## 2021-02-05 DIAGNOSIS — C79.51 BONE METASTASES: ICD-10-CM

## 2021-02-05 DIAGNOSIS — Z11.59 ENCOUNTER FOR SCREENING FOR OTHER VIRAL DISEASES: ICD-10-CM

## 2021-02-05 DIAGNOSIS — C50.919 METASTATIC BREAST CANCER: ICD-10-CM

## 2021-02-05 DIAGNOSIS — H25.043 POSTERIOR SUBCAPSULAR POLAR AGE-RELATED CATARACT OF BOTH EYES: ICD-10-CM

## 2021-02-05 DIAGNOSIS — Z01.818 PRE-OP EVALUATION: Primary | ICD-10-CM

## 2021-02-05 LAB
SARS-COV-2 RNA RESP QL NAA+PROBE: NORMAL
SPECIMEN SOURCE: NORMAL

## 2021-02-05 PROCEDURE — 74177 CT ABD & PELVIS W/CONTRAST: CPT | Mod: 59 | Performed by: RADIOLOGY

## 2021-02-05 PROCEDURE — U0005 INFEC AGEN DETEC AMPLI PROBE: HCPCS | Performed by: OPHTHALMOLOGY

## 2021-02-05 PROCEDURE — 98967 PH1 ASSMT&MGMT NQHP 11-20: CPT | Performed by: PHYSICIAN ASSISTANT

## 2021-02-05 PROCEDURE — A9552 F18 FDG: HCPCS | Performed by: RADIOLOGY

## 2021-02-05 PROCEDURE — 78816 PET IMAGE W/CT FULL BODY: CPT | Mod: PS | Performed by: RADIOLOGY

## 2021-02-05 PROCEDURE — 71260 CT THORAX DX C+: CPT | Mod: 59 | Performed by: RADIOLOGY

## 2021-02-05 PROCEDURE — U0003 INFECTIOUS AGENT DETECTION BY NUCLEIC ACID (DNA OR RNA); SEVERE ACUTE RESPIRATORY SYNDROME CORONAVIRUS 2 (SARS-COV-2) (CORONAVIRUS DISEASE [COVID-19]), AMPLIFIED PROBE TECHNIQUE, MAKING USE OF HIGH THROUGHPUT TECHNOLOGIES AS DESCRIBED BY CMS-2020-01-R: HCPCS | Performed by: OPHTHALMOLOGY

## 2021-02-05 RX ORDER — IOPAMIDOL 755 MG/ML
100 INJECTION, SOLUTION INTRAVASCULAR ONCE
Status: COMPLETED | OUTPATIENT
Start: 2021-02-05 | End: 2021-02-05

## 2021-02-05 RX ADMIN — IOPAMIDOL 78 ML: 755 INJECTION, SOLUTION INTRAVASCULAR at 11:02

## 2021-02-05 SDOH — HEALTH STABILITY: MENTAL HEALTH: HOW OFTEN DO YOU HAVE 6 OR MORE DRINKS ON ONE OCCASION?: NOT ASKED

## 2021-02-05 SDOH — HEALTH STABILITY: MENTAL HEALTH: HOW MANY STANDARD DRINKS CONTAINING ALCOHOL DO YOU HAVE ON A TYPICAL DAY?: NOT ASKED

## 2021-02-05 SDOH — HEALTH STABILITY: MENTAL HEALTH: HOW OFTEN DO YOU HAVE A DRINK CONTAINING ALCOHOL?: NOT ASKED

## 2021-02-05 ASSESSMENT — LIFESTYLE VARIABLES: TOBACCO_USE: 0

## 2021-02-05 ASSESSMENT — PAIN SCALES - GENERAL: PAINLEVEL: MODERATE PAIN (4)

## 2021-02-05 NOTE — H&P
Pre-Operative H & P         Video-Visit Details    Type of service:  Video Visit- converted to telephone visit due to technical difficulty    Patient verbally consented to video service today: YES      Video Start Time: 1300  Video End Time (time video stopped): 1311    Originating Location (pt. Location): Home    Distant Location (provider location):  home    Mode of Communication:  doximity telephone visit      CC:  Preoperative exam to assess for increased cardiopulmonary risk while undergoing surgery and anesthesia.    Date of Encounter: 2/5/2021  Primary Care Physician:  Mohit Barcenas  Associated diagnosis: Posterior subcapsular polar age-related cataract of both eyes    HPI  Shaneka Alvarez is a 60 year old female who presents for pre-operative H & P in preparation for PHACOEMULSIFICATION, CATARACT, WITH INTRAOCULAR LENS IMPLANT, TORIC LENS LEFT with Dr. Barkley on 2/8/21 at [facility]. Patient is being evaluated for comorbid conditions of breast cancer, PE, MS, GERD, migraines, depression, anxiety      Ms. Alvarez was recently diagnosed with cataracts. She has been on chemotherapy for recurrent breast cancer. Since restarting chemotherapy, she has been experiencing decreased bilateral vision. She was seen by Dr. Barkley for further evaluation. She is now scheduled for the above procedure.     History is obtained from the patient and chart review.      Past Medical History  Past Medical History:   Diagnosis Date     Breast cancer (H)     Age 44     Chemotherapy induced nausea and vomiting 12/15/2020     Common migraine      Esophageal reflux      H/O bilateral mastectomy      Mild major depression (H)      Multiple sclerosis (H)      Pulmonary embolism (H)        Past Surgical History  Past Surgical History:   Procedure Laterality Date     ENDOBRONCHIAL ULTRASOUND FLEXIBLE N/A 9/5/2019    Procedure: Flexible Bronchoscopy, Endobronchial Ultrasound, Radial Probe;  Surgeon: Laura  Sree Almnedarez MD;  Location: [facility] OR      REMOVAL OF OVARY/TUBE(S)       HERNIA REPAIR, UMBILICAL       mastectomy  Bilateral 2006     MASTECTOMY, BILATERAL         Hx of Blood transfusions/reactions: denies     Hx of abnormal bleeding or anti-platelet use: Eliquis    Menstrual history: No LMP recorded. Patient is postmenopausal.    Steroid use in the last year: chito    Personal or FH with difficulty with Anesthesia:  denies    Prior to Admission Medications  Current Outpatient Medications   Medication Sig Dispense Refill     acetaminophen (TYLENOL) 500 MG tablet Take 2 tablets (1,000 mg) by mouth 3 times daily 180 tablet 0     apixaban ANTICOAGULANT (ELIQUIS) 5 MG tablet Take 1 tablet (5 mg) by mouth 2 times daily 60 tablet 3     busPIRone (BUSPAR) 15 MG tablet Take 1 tablet (15 mg) by mouth 2 times daily 180 tablet 1     calcium citrate-vitamin D (CITRACAL) 315-250 MG-UNIT TABS Take by mouth daily       Cholecalciferol (VITAMIN D3 PO) Take 2,000 Units by mouth daily       cyclopentolate (CYCLOGYL) 1 % ophthalmic solution INSTILL 1 DROP INTO OU TID UTD       diclofenac (VOLTAREN) 1 % topical gel Place 4 g onto the skin 4 times daily . Apply to areas of pain. 200 g 0     ibuprofen (ADVIL/MOTRIN) 600 MG tablet Take 1 tablet (600 mg) by mouth 4 times daily as needed for moderate pain 90 tablet 3     loperamide (IMODIUM) 2 MG capsule Start with 2 caps (4 mg), then take one cap (2 mg) after each diarrheal stool as needed. Do not use more than 8 caps (16 mg) per day. 30 capsule 0     LORazepam (ATIVAN) 1 MG tablet Take 1 tablet (1 mg) by mouth every 4 hours as needed for anxiety or nausea 30 tablet 0     magnesium 250 MG tablet Take 1 tablet by mouth daily       meclizine (ANTIVERT) 25 MG tablet Take 1 tablet (25 mg) by mouth 3 times daily as needed for nausea 30 tablet 0     morphine (MS CONTIN) 15 MG CR tablet Take 1 tablet (15 mg) by mouth every evening 30 tablet 0     morphine (MSIR) 15 MG IR tablet Take 1-2  "tablets (15-30 mg) by mouth every 3 hours as needed for severe pain 180 tablet 0     naloxone (NARCAN) 1 mg/mL for intranasal kit (2 syringes with 2 mucosal atomizer device) In opioid overdose put cone in nostril and push 1/2 of contents into each nostril.  Repeat every 3 min if no response until help arrives. 1 Syringe 1     polyethylene glycol (MIRALAX/GLYCOLAX) packet Take 17 g by mouth 2 times daily as needed for constipation . Goal is to have a bowel movement every 1-2 days.       potassium chloride ER (KLOR-CON M) 10 MEQ CR tablet Take 1 tablet (10 mEq) by mouth 3 times daily 90 tablet 0     prochlorperazine (COMPAZINE) 10 MG tablet Take 1 tablet (10 mg) by mouth every 6 hours as needed (Nausea/Vomiting) 30 tablet 2     propranolol ER (INDERAL LA) 80 MG 24 hr capsule Take 80 mg by mouth daily       senna-docusate (SENOKOT-S/PERICOLACE) 8.6-50 MG tablet Take 1-2 tablets by mouth 2 times daily as needed for constipation . Goal is to have a bowel movement every 1-2 days.       syringe/needle, disp, (B-D INTEGRA SYRINGE) 23G X 1\" 3 ML MISC 1 each as needed 25 each 0     traZODone (DESYREL) 50 MG tablet Take 1 tablet (50 mg) by mouth At Bedtime 30 tablet 1     venlafaxine (EFFEXOR-XR) 75 MG 24 hr capsule Take 225 mg by mouth daily         Allergies  Allergies   Allergen Reactions     Bactrim [Sulfamethoxazole W/Trimethoprim]      Internal bleeding     Copaxone [Glatiramer] Hives       Social History  Social History     Socioeconomic History     Marital status:      Spouse name: Kash     Number of children: 3     Years of education: Not on file     Highest education level: Not on file   Occupational History     Employer: [employer]   Social Needs     Financial resource strain: Not on file     Food insecurity     Worry: Not on file     Inability: Not on file     Transportation needs     Medical: Not on file     Non-medical: Not on file   Tobacco Use     Smoking status: Former Smoker     Types: Cigarettes "     Quit date: 2005     Years since quitting: 15.1     Smokeless tobacco: Never Used   Substance and Sexual Activity     Alcohol use: Yes     Alcohol/week: 2.0 standard drinks     Types: 1 Glasses of wine, 1 Cans of beer per week     Comment: TWICE A WEEK     Drug use: No     Sexual activity: Yes     Partners: Male     Birth control/protection: None     Comment: not needed after chemo   Lifestyle     Physical activity     Days per week: Not on file     Minutes per session: Not on file     Stress: Not on file   Relationships     Social connections     Talks on phone: Not on file     Gets together: Not on file     Attends [employer] service: Not on file     Active member of club or organization: Not on file     Attends meetings of clubs or organizations: Not on file     Relationship status: Not on file     Intimate partner violence     Fear of current or ex partner: Not on file     Emotionally abused: Not on file     Physically abused: Not on file     Forced sexual activity: Not on file   Other Topics Concern     Parent/sibling w/ CABG, MI or angioplasty before 65F 55M? Not Asked   Social History Narrative     Not on file       Family History  Family History   Problem Relation Age of Onset     Breast Cancer Maternal Aunt 50         at 85     Breast Cancer Cousin 40     Breast Cancer Mother 49        2nd primary, contralateral breast at 69;  at 86     Melanoma Mother      Breast Cancer Maternal Grandmother 40     Ovarian Cancer Maternal Grandmother      Breast Cancer Paternal Grandmother 50         at 60     Brain Cancer Cousin 20     Breast Cancer Paternal Aunt 50         at 60     Breast Cancer Cousin 45        paternal cousin         ROS/MED HX    ENT/Pulmonary:    (-) tobacco use   Neurologic:     (+) migraines, Multiple Sclerosis,     Cardiovascular:     (+) -----Taking blood thinners Previous cardiac testing   Echo: Date:  Results:  EF normal, no significant valve abnormalities  Stress  Test: Date: Results:    ECG Reviewed: Date: 12/15/20 Results:    Cath: Date: Results:        METS/Exercise Tolerance:     Hematologic:  - neg hematologic  ROS    (-) History of Transfusion   Musculoskeletal:  - neg musculoskeletal ROS       GI/Hepatic:     (+) GERD,       Renal/Genitourinary:  - neg Renal ROS       Endo:  - neg endo ROS       Psychiatric:     (+) psychiatric history anxiety and depression       Infectious Disease:  - neg infectious disease ROS       Malignancy: (+) Malignancy, History of Breast.Breast CA Active status post Surgery, Chemo and Radiation.          Other:           The complete review of systems is negative other than noted in the HPI or here.    0 lbs 0 oz  Data Unavailable   There is no height or weight on file to calculate BMI.       Physical Exam-  No exam today due to telephone visit    Please refer to the physical examination documented by the anesthesiologist in the anesthesia record on the day of surgery    Labs: (personally reviewed)  Component      Latest Ref Rng & Units 1/26/2021   Absolute Lymphocytes      0.8 - 5.3 10e9/L 0.7 (L)   Absolute Monocytes      0.0 - 1.3 10e9/L 0.3   Absolute Eosinophils      0.0 - 0.7 10e9/L 0.1   Absolute Basophils      0.0 - 0.2 10e9/L 0.0   Abs Immature Granulocytes      0 - 0.4 10e9/L 0.0   Sodium      133 - 144 mmol/L 138   Potassium      3.4 - 5.3 mmol/L 4.3   Chloride      94 - 109 mmol/L 102   Carbon Dioxide      20 - 32 mmol/L 30   Anion Gap      3 - 14 mmol/L 6   Glucose      70 - 99 mg/dL 112 (H)   Urea Nitrogen      7 - 30 mg/dL 23   Creatinine      0.52 - 1.04 mg/dL 0.62   GFR Estimate      >60 mL/min/1.73:m2 >90   GFR Estimate If Black      >60 mL/min/1.73:m2 >90   Calcium      8.5 - 10.1 mg/dL 9.4   Bilirubin Total      0.2 - 1.3 mg/dL 0.3   Albumin      3.4 - 5.0 g/dL 3.7   Protein Total      6.8 - 8.8 g/dL 6.7 (L)   Alkaline Phosphatase      40 - 150 U/L 60   ALT      0 - 50 U/L 31   AST      0 - 45 U/L 14     EKG 12/2020  Sinus   Tachycardia  -Short CA syndrome  Aurelio = 104  BORDERLINE RHYTHM    ECHO 2006  EF normal, no significant valve abnormalities    Outside records reviewed from: care everywhere     ASSESSMENT and PLAN  Shaneka Alvarez is a 60 year old female scheduled for PHACOEMULSIFICATION, CATARACT, WITH INTRAOCULAR LENS IMPLANT, TORIC LENS LEFT on 2/8/21 by Dr. Barkley in treatment of Posterior subcapsular polar age-related cataract of both eyes.  PAC referral for risk assessment and optimization for anesthesia with comorbid conditions of breast cancer, PE, MS, GERD, migraines, depression, anxiety:      Pre-operative considerations:   1.  Cardiac:  Functional status- METS >4. No reported cardiac history. denies cardiac symptoms. previous cardiac testing as above. low risk surgery with 0.4% (RCRI #) risk of major adverse cardiac event.   2.  Pulm: JUANY risk: low. non smoker. denies pulmonary symptoms. COVID testing per surgery team.   3.  GI:  Risk of PONV score = 3.  If > 2, anti-emetic intervention recommended. GERD- intermittent and not using medication.   4.  Neuro: MS- stable. Denies recently symptoms and is not currently using medication.  Follows with Dr. Quiles.   5. heme: h/o PE diagnosed about 3 months ago. Patient can continue Eliquis perioperatively per surgery team.   6. psych: anxiety and depression- stable. can continue medications.   7. onc: breast cancer in 2006 s/p chemotherapy, radiation and surgery. Now with recurrence and bone mets. Followed by Dr. Zepeda. She continues with chemotherapy.   8. Access: Difficult IV access. patient has left chest port in place.      VTE risk: 1.8%     Patient is optimized and is acceptable candidate for the proposed procedure.  No further diagnostic evaluation is needed.     Telephone visit due to technical difficulty. 11 minutes on telephone, 15 minutes spent review chart and completing documentation today.      **Physical exam and vital signs not completed today as this visit was  scheduled as a virtual visit during Covid 19 pandemic. Physical exam should be completed the DOS in pre-op**    Carmen Dela Cruz PA-C  Preoperative Assessment Center  [facility]  Phone: [phone]  Fax: [phone]

## 2021-02-05 NOTE — PATIENT INSTRUCTIONS
Preparing for Your Surgery      Name:  Shaneka Alvarez   MRN:  [id number]   :  1960   Today's Date:  2021         Arriving for surgery:  Surgery date:  21  Arrival time:  8:15 am    Restrictions due to COVID 19:  One consistent visitor is allowed per patient  No ill visitors  All visitors must wear face mask     parking is available for anyone with mobility limitations or disabilities. (Monday- Friday 7 am- 5 pm)    Please come to:    [facility]  [address] [place] [phone]    Please check in on the 5th floor at the [facility]ulatory Surgery Center       What can I eat or drink?    -  You may eat and drink normally until 8 hours before surgery. (Until 1:15 am)  -  You may have clear liquids up to 4 hours before surgery. (Until 5:15 am)  Examples of clear liquids:  Water  Clear broth  Juices (apple, white grape, white cranberry  and cider) without pulp  Noncarbonated, powder based beverages  (lemonade and Nithin-Aid)  Sodas (Sprite, 7-Up, ginger ale and seltzer)  Coffee or tea (without milk or cream)  Gatorade    --No alcohol for at least 24 hours before surgery    Which medicines can I take?    Hold Aspirin for 7 days before surgery.   Hold Multivitamins for 7 days before surgery.  Hold Supplements for 7 days before surgery.  Hold Ibuprofen (Advil, Motrin) for 1 day before surgery.  Hold Naproxen (Aleve) for 4 days before surgery.    **Hold Diclofenac (Voltaren) gel 24 hours before surgery.**    -  DO NOT take the following medications the day of surgery:  Polyethylene Glycol (Miralax)  Potassium Chloride (Klor-Con)  Senna-Docusate (Senokot)      -  PLEASE TAKE the following medications the day of surgery:  Acetaminophen (Tylenol)  Apixaban (Eliquis)  Buspirone (Buspar)  Lorazepam (Ativan)  Meclizine (Antivert)  Morphine (MSIR)  Prochlorperazine (Compazine)  Propranolol (Inderal)  Venlafaxine (Effexor)   Loperamide (Imodium)    How do I prepare myself?  - Please  take 2 showers before surgery using Scrubcare or Hibiclens soap.    Use this soap only from the neck to your toes.     Leave the soap on your skin for one minute--then rinse thoroughly.      You may use your own shampoo and conditioner; no other hair products.   - Please remove all jewelry and body piercings.  - No lotions, deodorants or fragrance.  - No makeup or fingernail polish.   - Bring your ID and insurance card.        - All patients are required to have a Covid-19 test within 4 days of surgery/procedure.      -Patients will be contacted by the [facility] scheduling team within 1 week of surgery to make an appointment.      - Patients may call the Scheduling team at [phone] if they have not been scheduled within 4 days of  surgery.      ALL PATIENTS ARE REQUIRED TO HAVE A RESPONSIBLE ADULT TO DRIVE AND BE IN ATTENDANCE WITH THEM FOR 24 HOURS FOLLOWING SURGERY       Questions or Concerns:    -For questions regarding the day of surgery please contact the Ambulatory Surgery Center at [phone].    -If you have health changes between today and your surgery please contact your surgeon.     For questions after surgery please call your surgeons office.

## 2021-02-05 NOTE — ANESTHESIA PREPROCEDURE EVALUATION
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]   Procedure(s):  PHACOEMULSIFICATION, CATARACT, WITH INTRAOCULAR LENS IMPLANT, TORIC LENS LEFT     LABS:  CBC:   Lab Results   Component Value Date    WBC 3.4 (L) 2021    WBC 3.1 (L) 2021    HGB 12.1 2021    HGB 12.5 2021    HCT 37.4 2021    HCT 40.1 2021     2021     2021     BMP:   Lab Results   Component Value Date     2021     2021    POTASSIUM 4.3 2021    POTASSIUM 4.1 2021    CHLORIDE 102 2021    CHLORIDE 107 2021    CO2 30 2021    CO2 28 2021    BUN 23 2021    BUN 11 2021    CR 0.62 2021    CR 0.60 2021     (H) 2021     (H) 2021     COAGS:   Lab Results   Component Value Date    INR 1.05 2020     POC:   Lab Results   Component Value Date    BGM 89 2019     OTHER:   Lab Results   Component Value Date    LACT 1.4 12/15/2020    A1C 5.4 2016    RAFAELA 9.4 2021    PHOS 4.5 2021    MAG 2.2 2021    ALBUMIN 3.7 2021    PROTTOTAL 6.7 (L) 2021    ALT 31 2021    AST 14 2021    ALKPHOS 60 2021    BILITOTAL 0.3 2021    LIPASE 46 (L) 2020    TSH 1.11 2014    CRP 5.3 12/15/2020        Preop Vitals    BP Readings from Last 3 Encounters:   21 112/78   21 118/83   21 118/79    Pulse Readings from Last 3 Encounters:   21 82   21 76   21 108      Resp Readings from Last 3 Encounters:   21 16   21 16   21 18    SpO2 Readings from Last 3 Encounters:   21 99%   21 100%   21 100%      Temp Readings from Last 1 Encounters:   21  "97.2  F (36.2  C) (Oral)    Ht Readings from Last 1 Encounters:   04/07/20 1.575 m (5' 2\")      Wt Readings from Last 1 Encounters:   01/26/21 58.1 kg (128 lb)    Estimated body mass index is 23.41 kg/m  as calculated from the following:    Height as of 4/7/20: 1.575 m (5' 2\").    Weight as of 1/26/21: 58.1 kg (128 lb).     LDA:  Port A Cath Single Left Chest wall (Active)   Site Assessment WDL 01/20/21 1230   Dressing Status clean;dry;intact 01/20/21 1230   Dressing Intervention Transparent 01/20/21 1230   Line Status Heparin locked 01/20/21 1700   Access Date 01/20/21 01/20/21 1230   Access Attempts 1 01/20/21 1230   Gauge Noncoring 90 degree bend;20 gauge;3/4 inch 01/20/21 1230   De-Access Date 01/20/21 01/20/21 1700   Number of days:         Past Medical History:   Diagnosis Date     Breast cancer (H)     Age 44     Chemotherapy induced nausea and vomiting 12/15/2020     Common migraine      Esophageal reflux      H/O bilateral mastectomy      Mild major depression (H)      Multiple sclerosis (H)      Pulmonary embolism (H)       Past Surgical History:   Procedure Laterality Date     ENDOBRONCHIAL ULTRASOUND FLEXIBLE N/A 9/5/2019    Procedure: Flexible Bronchoscopy, Endobronchial Ultrasound, Radial Probe;  Surgeon: Sree Sanchez MD;  Location: [facility] OR      REMOVAL OF OVARY/TUBE(S)       HERNIA REPAIR, UMBILICAL       mastectomy  Bilateral 2006     MASTECTOMY, BILATERAL        Allergies   Allergen Reactions     Bactrim [Sulfamethoxazole W/Trimethoprim]      Internal bleeding     Copaxone [Glatiramer] Hives        Anesthesia Evaluation     . Pt has had prior anesthetic. Type: General.    No history of anesthetic complications          ROS/MED HX    ENT/Pulmonary:    (-) tobacco use   Neurologic:     (+) migraines, Multiple Sclerosis,     Cardiovascular:     (+) -----Taking blood thinners Pt has received instructions: Previous cardiac testing   Echo: Date: 2006 Results:  EF normal, no significant valve " abnormalities  Stress Test: Date: Results:    ECG Reviewed: Date: 12/15/20 Results:    Cath: Date: Results:        METS/Exercise Tolerance: Comment: Walk on treadmill >4 METS   Hematologic:     (+) History of blood clots, pt is anticoagulated,    (-) History of Transfusion   Musculoskeletal: Comment: Spine mets from breast CA with pathologic fracture of cervical vertebrae        GI/Hepatic:     (+) GERD (Diet controlled),       Renal/Genitourinary:  - neg Renal ROS       Endo:  - neg endo ROS       Psychiatric:     (+) psychiatric history anxiety and depression H/O chronic opiod use .       Infectious Disease:  - neg infectious disease ROS       Malignancy: (+) Malignancy, History of Breast.Breast CA Active status post Chemo, Radiation and Surgery.          Other:    (+) , H/O Chronic Pain,                       PHYSICAL EXAM:   Mental Status/Neuro: A/A/O   Airway: Facies: Feasible  Mallampati: II  Mouth/Opening: Full  TM distance: > 6 cm  Neck ROM: Full   Respiratory: Auscultation: CTAB     Resp. Rate: Normal     Resp. Effort: Normal      CV: Rhythm: Regular  Rate: Age appropriate  Heart: Normal Sounds  Edema: None   Comments:      Dental: Normal Dentition                Assessment:   ASA SCORE: 3    H&P: History and physical reviewed and following examination; no interval change.   Smoking Status:  Non-Smoker/Unknown   NPO Status: NPO Appropriate     Plan:   Anes. Type:  MAC   Pre-Medication: Acetaminophen   Induction:  N/a   Airway: Native Airway   Access/Monitoring: PIV   Maintenance: N/a     Postop Plan:   Postop Pain: None  Postop Sedation/Airway: Not planned  Disposition: Outpatient     PONV Management:   Adult Risk Factors: Female, Non-Smoker   Prevention: Ondansetron     CONSENT: Direct conversation   Plan and risks discussed with: Patient   Blood Products: Consent Deferred (Minimal Blood Loss)             [unfilled]  PAC Discussion and Assessment    ASA Classification: 3  Case is suitable for:  ASC  Anesthetic techniques and relevant risks discussed: MAC with GA as backup                  PAC Resident/NP Anesthesia Assessment: Shaneka Alvarez is a 60 year old female scheduled for PHACOEMULSIFICATION, CATARACT, WITH INTRAOCULAR LENS IMPLANT, TORIC LENS LEFT on 2/8/21 by Dr. Barkley in treatment of Posterior subcapsular polar age-related cataract of both eyes.  PAC referral for risk assessment and optimization for anesthesia with comorbid conditions of breast cancer, PE, MS, GERD, migraines, depression, anxiety:      Pre-operative considerations:   1.  Cardiac:  Functional status- METS >4. No reported cardiac history. denies cardiac symptoms. previous cardiac testing as above. low risk surgery with 0.4% (RCRI #) risk of major adverse cardiac event.   2.  Pulm: JUANY risk: low. non smoker. denies pulmonary symptoms. COVID testing per surgery team.   3.  GI:  Risk of PONV score = 3.  If > 2, anti-emetic intervention recommended. GERD- intermittent and not using medication.   4.  Neuro: MS- stable. Denies recently symptoms and is not currently using medication.  Follows with Dr. Quiles.   5. heme: h/o PE diagnosed about 3 months ago. Patient can continue Eliquis perioperatively per surgery team.   6. psych: anxiety and depression- stable. can continue medications.   7. onc: breast cancer in 2006 s/p chemotherapy, radiation and surgery. Now with recurrence and bone mets. Followed by Dr. Zepeda. She continues with chemotherapy.   8. Access: Difficult IV access. patient has left chest port in place.      VTE risk: 1.8%     Patient is optimized and is acceptable candidate for the proposed procedure.  No further diagnostic evaluation is needed.      **Physical exam and vital signs not completed today as this visit was scheduled as a virtual visit during Covid 19 pandemic. Physical exam should be completed the DOS in pre-op**     **For further details of assessment and testing please see H and P completed on same date.          YOLY Morillo PA-C

## 2021-02-05 NOTE — PROGRESS NOTES
Shaneka is a 60 year old who is being evaluated via a billable video visit.      How would you like to obtain your AVS? MyChart  If the video visit is dropped, the invitation should be resent by: Text to cell phone: [phone]  Will anyone else be joining your video visit? No      HPI           Review of Systems         Physical Exam     RICKY Soria LPN

## 2021-02-05 NOTE — PROGRESS NOTES
Shaneka is a 60 year old who is being evaluated via a billable video visit.      How would you like to obtain your AVS? MyChart    Will anyone else be joining your video visit? No    HPI           Review of Systems         Physical Exam       RICKY Soria LPN

## 2021-02-06 LAB
LABORATORY COMMENT REPORT: NORMAL
SARS-COV-2 RNA RESP QL NAA+PROBE: NEGATIVE
SPECIMEN SOURCE: NORMAL

## 2021-02-08 ENCOUNTER — HOSPITAL ENCOUNTER (OUTPATIENT)
Facility: AMBULATORY SURGERY CENTER | Age: 61
Discharge: HOME | End: 2021-02-08
Attending: OPHTHALMOLOGY | Admitting: OPHTHALMOLOGY
Payer: COMMERCIAL

## 2021-02-08 ENCOUNTER — ANESTHESIA (OUTPATIENT)
Dept: SURGERY | Facility: AMBULATORY SURGERY CENTER | Age: 61
End: 2021-02-08

## 2021-02-08 ENCOUNTER — OFFICE VISIT (OUTPATIENT)
Dept: OPHTHALMOLOGY | Facility: CLINIC | Age: 61
End: 2021-02-08
Payer: COMMERCIAL

## 2021-02-08 VITALS
HEART RATE: 68 BPM | OXYGEN SATURATION: 99 % | BODY MASS INDEX: 23 KG/M2 | RESPIRATION RATE: 17 BRPM | WEIGHT: 125 LBS | HEIGHT: 62 IN | SYSTOLIC BLOOD PRESSURE: 115 MMHG | TEMPERATURE: 97.2 F | DIASTOLIC BLOOD PRESSURE: 75 MMHG

## 2021-02-08 DIAGNOSIS — H21.503: ICD-10-CM

## 2021-02-08 DIAGNOSIS — Z98.890 POSTOPERATIVE EYE STATE: ICD-10-CM

## 2021-02-08 DIAGNOSIS — Z96.1 PSEUDOPHAKIA, LEFT EYE: Primary | ICD-10-CM

## 2021-02-08 DIAGNOSIS — H25.043 POSTERIOR SUBCAPSULAR POLAR AGE-RELATED CATARACT OF BOTH EYES: ICD-10-CM

## 2021-02-08 PROCEDURE — 99207 PR SERVICE NOT STAFFED W/SUPERV PROV: CPT | Mod: GC | Performed by: OPHTHALMOLOGY

## 2021-02-08 PROCEDURE — 66982 XCAPSL CTRC RMVL CPLX WO ECP: CPT | Mod: LT

## 2021-02-08 PROCEDURE — V2599 CONTACT LENS/ES OTHER TYPE: HCPCS | Mod: LT

## 2021-02-08 DEVICE — IMPLANTABLE DEVICE: Type: IMPLANTABLE DEVICE | Site: EYE | Status: FUNCTIONAL

## 2021-02-08 RX ORDER — CYCLOPENTOLAT/TROPIC/PHENYLEPH 1%-1%-2.5%
1 DROPS (EA) OPHTHALMIC (EYE)
Status: COMPLETED | OUTPATIENT
Start: 2021-02-08 | End: 2021-02-08

## 2021-02-08 RX ORDER — PREDNISOLONE ACETATE 10 MG/ML
1 SUSPENSION/ DROPS OPHTHALMIC 4 TIMES DAILY
Qty: 10 ML | Refills: 1 | Status: SHIPPED | OUTPATIENT
Start: 2021-02-08 | End: 2021-04-14

## 2021-02-08 RX ORDER — MOXIFLOXACIN 5 MG/ML
1 SOLUTION/ DROPS OPHTHALMIC 3 TIMES DAILY
Qty: 3 ML | Refills: 1 | Status: SHIPPED | OUTPATIENT
Start: 2021-02-08 | End: 2021-02-18

## 2021-02-08 RX ORDER — NALOXONE HYDROCHLORIDE 0.4 MG/ML
0.4 INJECTION, SOLUTION INTRAMUSCULAR; INTRAVENOUS; SUBCUTANEOUS
Status: DISCONTINUED | OUTPATIENT
Start: 2021-02-08 | End: 2021-02-09 | Stop reason: HOSPADM

## 2021-02-08 RX ORDER — TIMOLOL 5 MG/ML
SOLUTION/ DROPS OPHTHALMIC PRN
Status: DISCONTINUED | OUTPATIENT
Start: 2021-02-08 | End: 2021-02-08 | Stop reason: HOSPADM

## 2021-02-08 RX ORDER — NALOXONE HYDROCHLORIDE 0.4 MG/ML
0.2 INJECTION, SOLUTION INTRAMUSCULAR; INTRAVENOUS; SUBCUTANEOUS
Status: DISCONTINUED | OUTPATIENT
Start: 2021-02-08 | End: 2021-02-09 | Stop reason: HOSPADM

## 2021-02-08 RX ORDER — MOXIFLOXACIN IN NACL,ISO-OS/PF 0.3MG/0.3
SYRINGE (ML) INTRAOCULAR PRN
Status: DISCONTINUED | OUTPATIENT
Start: 2021-02-08 | End: 2021-02-08 | Stop reason: HOSPADM

## 2021-02-08 RX ORDER — BALANCED SALT SOLUTION 6.4; .75; .48; .3; 3.9; 1.7 MG/ML; MG/ML; MG/ML; MG/ML; MG/ML; MG/ML
SOLUTION OPHTHALMIC PRN
Status: DISCONTINUED | OUTPATIENT
Start: 2021-02-08 | End: 2021-02-08 | Stop reason: HOSPADM

## 2021-02-08 RX ORDER — ONDANSETRON 2 MG/ML
4 INJECTION INTRAMUSCULAR; INTRAVENOUS EVERY 30 MIN PRN
Status: DISCONTINUED | OUTPATIENT
Start: 2021-02-08 | End: 2021-02-09 | Stop reason: HOSPADM

## 2021-02-08 RX ORDER — ONDANSETRON 4 MG/1
4 TABLET, ORALLY DISINTEGRATING ORAL EVERY 30 MIN PRN
Status: DISCONTINUED | OUTPATIENT
Start: 2021-02-08 | End: 2021-02-09 | Stop reason: HOSPADM

## 2021-02-08 RX ORDER — SODIUM CHLORIDE, SODIUM LACTATE, POTASSIUM CHLORIDE, CALCIUM CHLORIDE 600; 310; 30; 20 MG/100ML; MG/100ML; MG/100ML; MG/100ML
INJECTION, SOLUTION INTRAVENOUS CONTINUOUS
Status: DISCONTINUED | OUTPATIENT
Start: 2021-02-08 | End: 2021-02-09 | Stop reason: HOSPADM

## 2021-02-08 RX ORDER — ALBUTEROL SULFATE 0.83 MG/ML
2.5 SOLUTION RESPIRATORY (INHALATION) EVERY 4 HOURS PRN
Status: DISCONTINUED | OUTPATIENT
Start: 2021-02-08 | End: 2021-02-09 | Stop reason: HOSPADM

## 2021-02-08 RX ORDER — HEPARIN SODIUM (PORCINE) LOCK FLUSH IV SOLN 100 UNIT/ML 100 UNIT/ML
5 SOLUTION INTRAVENOUS
Status: DISCONTINUED | OUTPATIENT
Start: 2021-02-08 | End: 2021-02-09 | Stop reason: HOSPADM

## 2021-02-08 RX ORDER — ACETAMINOPHEN 325 MG/1
975 TABLET ORAL ONCE
Status: COMPLETED | OUTPATIENT
Start: 2021-02-08 | End: 2021-02-08

## 2021-02-08 RX ORDER — FENTANYL CITRATE 50 UG/ML
INJECTION, SOLUTION INTRAMUSCULAR; INTRAVENOUS PRN
Status: DISCONTINUED | OUTPATIENT
Start: 2021-02-08 | End: 2021-02-08

## 2021-02-08 RX ORDER — PROPARACAINE HYDROCHLORIDE 5 MG/ML
1 SOLUTION/ DROPS OPHTHALMIC ONCE
Status: COMPLETED | OUTPATIENT
Start: 2021-02-08 | End: 2021-02-08

## 2021-02-08 RX ORDER — LIDOCAINE HYDROCHLORIDE 10 MG/ML
INJECTION, SOLUTION EPIDURAL; INFILTRATION; INTRACAUDAL; PERINEURAL PRN
Status: DISCONTINUED | OUTPATIENT
Start: 2021-02-08 | End: 2021-02-08 | Stop reason: HOSPADM

## 2021-02-08 RX ORDER — SODIUM CHLORIDE, SODIUM LACTATE, POTASSIUM CHLORIDE, CALCIUM CHLORIDE 600; 310; 30; 20 MG/100ML; MG/100ML; MG/100ML; MG/100ML
500 INJECTION, SOLUTION INTRAVENOUS CONTINUOUS
Status: DISCONTINUED | OUTPATIENT
Start: 2021-02-08 | End: 2021-02-08 | Stop reason: HOSPADM

## 2021-02-08 RX ORDER — LIDOCAINE 40 MG/G
CREAM TOPICAL
Status: DISCONTINUED | OUTPATIENT
Start: 2021-02-08 | End: 2021-02-08 | Stop reason: HOSPADM

## 2021-02-08 RX ORDER — MEPERIDINE HYDROCHLORIDE 25 MG/ML
12.5 INJECTION INTRAMUSCULAR; INTRAVENOUS; SUBCUTANEOUS
Status: DISCONTINUED | OUTPATIENT
Start: 2021-02-08 | End: 2021-02-09 | Stop reason: HOSPADM

## 2021-02-08 RX ORDER — TETRACAINE HYDROCHLORIDE 5 MG/ML
SOLUTION OPHTHALMIC PRN
Status: DISCONTINUED | OUTPATIENT
Start: 2021-02-08 | End: 2021-02-08 | Stop reason: HOSPADM

## 2021-02-08 RX ADMIN — FENTANYL CITRATE 50 MCG: 50 INJECTION, SOLUTION INTRAMUSCULAR; INTRAVENOUS at 09:39

## 2021-02-08 RX ADMIN — PROPARACAINE HYDROCHLORIDE 1 DROP: 5 SOLUTION/ DROPS OPHTHALMIC at 09:04

## 2021-02-08 RX ADMIN — ACETAMINOPHEN 975 MG: 325 TABLET ORAL at 09:20

## 2021-02-08 RX ADMIN — Medication 1 DROP: at 09:11

## 2021-02-08 RX ADMIN — SODIUM CHLORIDE, SODIUM LACTATE, POTASSIUM CHLORIDE, CALCIUM CHLORIDE 500 ML: 600; 310; 30; 20 INJECTION, SOLUTION INTRAVENOUS at 09:21

## 2021-02-08 RX ADMIN — HEPARIN SODIUM (PORCINE) LOCK FLUSH IV SOLN 100 UNIT/ML 5 ML: 100 SOLUTION at 11:03

## 2021-02-08 RX ADMIN — Medication 1 DROP: at 09:04

## 2021-02-08 RX ADMIN — Medication 1 DROP: at 09:18

## 2021-02-08 ASSESSMENT — EXTERNAL EXAM - LEFT EYE: OS_EXAM: NORMAL

## 2021-02-08 ASSESSMENT — TONOMETRY
IOP_METHOD: TONOPEN
OS_IOP_MMHG: 15

## 2021-02-08 ASSESSMENT — SLIT LAMP EXAM - LIDS: COMMENTS: NORMAL

## 2021-02-08 ASSESSMENT — VISUAL ACUITY: METHOD: SNELLEN - LINEAR

## 2021-02-08 ASSESSMENT — MIFFLIN-ST. JEOR: SCORE: 1100.25

## 2021-02-08 NOTE — OP NOTE
PREOPERATIVE DIAGNOSIS: Visually significant cataract, Left eye   POSTOPERATIVE DIAGNOSIS: Same   PROCEDURES:   1. Complex cataract extraction with toric intraocular lens implant Left eye.  2. Synechiolysis, left eye  SURGEON: Luis Barkley M.D.  RESIDENT SURGEON: Fadi Gifford M.D.  INDICATIONS: The patient Shaneka Alvarez presented to the eye clinic with decreased vision secondary to cataract in the Left eye. The risks, benefits and alternatives to cataract extraction were discussed. The patient elected to proceed. All questions were answered to the patient's satisfaction.   DESCRIPTION OF PROCEDURE:   Prior to the procedure, appropriate cardiac and respiratory monitors were applied to the patient.  In the pre-operative holding area, a drop of topical tetracaine followed by lidocaine gel followed by povidone iodine.  Pre-operative toric markings were placed at the 90-degree axis using a gentian violet marker while the patient was seated upright.  The patient was brought to the operating room where a surgical pause was carried out to identify with all members of the surgical team the correct surgical site.  With adequate anesthesia, the Left eye was prepped and draped in the usual sterile fashion. A lid speculum was placed, and the operating microscope was rotated into position.  Toric axis was reviewed and marked according to pre-operative calculations and the 90-degree marking that had previously been placed.  A paracentesis was created inferotemporal and superior.  Through this limbal paracentesis, the anterior chamber was filled with preservative-free lidocaine followed by viscoelastic. The viscoelastic cannula was used to sweep and lyse the iris-lenticular adhesions inferiorly and viscoat injected under the edge of the iris. A temporal wound was created at the limbus using a 2.6 mm blade. Due to poor mydriasis, a Malyugin (6.25 mm) capsular ring was inserted in to the eye to provide for pupillary  expansion. A capsulorrhexis was initiated using a bent 25-gauge needle and was completed in continuous and circular fashion using the capsulorrhexis forceps. The lens nucleus was hydrodissected using balanced salt solution.  The lens nucleus was rotated and removed using phacoemulsification in a stop and chop technique.  Residual cortical material was removed using irrigation-aspiration.  The capsular bag was reinflated to its maximal extent with cohesive viscoelastic.  A [id number] +22.5 diopter intraocular lens was inserted into the capsular bag.  The lens power selected was reviewed using the intraocular lens power measurements that were obtained preoperatively to confirm that the correct lens was selected for the desired post-operative refractive state. The Malyugin ring was then disinserted and removed from the eye. The residual viscoelastic was removed in its entirety, the wound were hydrated and found to be self-sealing.  Intracameral moxifloxacin was administered. Subconjunctival Dexamathasone was administered inferiorly. Tactile pressure was confirmed to be in a normal range.  The lid speculum was removed and a shield was applied.   The patient tolerated the procedure well, and there were no complications.    PLAN: The patient will be discharged to home and will follow up tomorrow morning in the eye clinic.  EBL:  None  Complications:  None  Implant Name Type Inv. Item Serial No.  Lot No. LRB No. Used Action   EYE IMP IOL TECNIS TORIC II 1-PC 22.5D CYL3.75 [id number] Lens/Eye Implant EYE IMP IOL TECNIS TORIC II 1-PC 22.5D CYL3.75 [id number]  Left 1 Implanted       Attending Physician Procedure Attestation: I was present for the entire procedure       Luis Barkley MD  , Comprehensive Ophthalmology  Department of Ophthalmology and Visual Neurosciences  [facility]

## 2021-02-08 NOTE — ANESTHESIA POSTPROCEDURE EVALUATION
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    Anesthesia Type:  MAC    Note:  Disposition: Outpatient   Postop Pain Control: Uneventful            Sign Out: Well controlled pain   PONV: No   Neuro/Psych: Uneventful            Sign Out: Acceptable/Baseline neuro status   Airway/Respiratory: Uneventful            Sign Out: Acceptable/Baseline resp. status   CV/Hemodynamics: Uneventful            Sign Out: Acceptable CV status   Other NRE: NONE   DID A NON-ROUTINE EVENT OCCUR? No         Last vitals:  Vitals:    02/08/21 1030 02/08/21 1055 02/08/21 1100   BP: 116/76 (P) 115/73 115/75   Pulse: 62 (P) 65 68   Resp: 16 (P) 18 17   Temp: 36.2  C (97.1  F)  36.2  C (97.2  F)   SpO2: 100% (P) 100% 99%       Last vitals prior to Anesthesia Care Transfer:  CRNA VITALS  2/8/2021 0955 - 2/8/2021 1055      2/8/2021             NIBP:  119/76    Pulse:  70    NIBP Mean:  95    Ht Rate:  70    SpO2:  96 %    Resp Rate (set):  10          Electronically Signed By: Mike Rodriguez MD  February 8, 2021  1:39 PM

## 2021-02-08 NOTE — DISCHARGE INSTRUCTIONS
[facility]  Home Care Following Anesthesia  For 24 hours after surgery:  1. Get plenty of rest.  A responsible adult must stay with you for at least 24 hours after you leave the surgery center.  2. Do not drive or use heavy equipment.  If you have weakness or tingling, don't drive or use heavy equipment until this feeling goes away.   3. Do not drink alcohol.   4. Avoid strenuous or risky activities.  Ask for help when climbing stairs.  5. You may feel lightheaded.  IF so, sit for a few minutes before standing.  Have someone help you get up.   6. If you have nausea (feel sick to your stomach): Drink only clear liquids such as apple juice, ginger ale, broth or 7-Up.  Rest may also help.  Be sure to drink enough fluids.  Move to a regular diet as you feel able.   7. You may have a slight fever.  Call the doctor if your fever is over 100 F (37.7 C) (taken under the tongue) or lasts longer than 24 hours.  8. You may have a dry mouth, a sore throat, muscle aches or trouble sleeping. These should go away after 24 hours.  9. Do not make important or legal decisions.               Tips for taking pain medications  To get the best pain relief possible, remember these points:    Take pain medications as directed, before pain becomes severe.    Pain medication can upset your stomach: taking it with food may help.    Constipation is a common side effect of pain medication. Drink plenty of  fluids.    Eat foods high in fiber. Take a stool softener if recommended by your doctor or pharmacist.    Do not drink alcohol, drive or operate machinery while taking pain medications.    Ask about other ways to control pain, such as with heat, ice or relaxation.    Tylenol/Acetaminophen Consumption  To help encourage the safe use of acetaminophen, the makers of TYLENOL  have lowered the maximum daily dose for single-ingredient Extra Strength TYLENOL  (acetaminophen) products sold in the [place] from 8  pills per day (4,000 mg) to 6 pills per day (3,000 mg). The dosing interval has also changed from 2 pills every 4-6 hours to 2 pills every 6 hours.    If you feel your pain relief is insufficient, you may take Tylenol/Acetaminophen in addition to your narcotic pain medication.     Be careful not to exceed 3,000 mg of Tylenol/Acetaminophen in a 24 hour period from all sources.    If you are taking extra strength Tylenol/acetaminophen (500 mg), the maximum dose is 6 tablets in 24 hours.    If you are taking regular strength acetaminophen (325 mg), the maximum dose is 9 tablets in 24 hours.    Call a doctor for any of the followin. Signs of infection (fever, growing tenderness at the surgery site, a large amount of drainage or bleeding, severe pain, foul-smelling drainage, redness, swelling).  2. It has been over 8 to 10 hours since surgery and you are still not able to urinate (pass water).  3. Headache for over 24 hours.  4. Signs of Covid-19 infection (temperature over 100 degrees, shortness of breath, cough, loss of taste/smell, generalized body aches, persistent headache, chills, sore throat, nausea/vomiting/diarrhea)  Your doctor is:       Dr. Luis Barkley, Ophthalmology: [phone]               Or dial [phone] and ask for the resident on call for:  Ophthalmology  For emergency care, call the:  [facility]:  [phone] (TTY for hearing impaired: [phone])

## 2021-02-08 NOTE — ANESTHESIA CARE TRANSFER NOTE
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    Anesthesia Type:   MAC     Note:      Level of Consciousness: awake  Oxygen Supplementation: room air    Independent Airway: airway patency satisfactory and stable  Dentition: dentition unchanged  Vital Signs Stable: post-procedure vital signs reviewed and stable  Report to RN Given: handoff report given  Patient transferred to: Phase II    Handoff Report: Identifed the Patient, Identified the Reponsible Provider, Reviewed the pertinent medical history, Discussed the surgical course, Reviewed Intra-OP anesthesia mangement and issues during anesthesia, Set expectations for post-procedure period and Allowed opportunity for questions and acknowledgement of understanding      Vitals: (Last set prior to Anesthesia Care Transfer)  CRNA VITALS  2/8/2021 0955 - 2/8/2021 1025      2/8/2021             NIBP:  119/76    Pulse:  70    NIBP Mean:  95    Ht Rate:  70    SpO2:  96 %    Resp Rate (set):  10        Electronically Signed By: GUTIERREZ Schaefer CRNA  February 8, 2021  10:25 AM

## 2021-02-08 NOTE — ANESTHESIA POSTPROCEDURE EVALUATION
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    Anesthesia Type:  MAC    Note:  Disposition: Outpatient   Postop Pain Control: Uneventful            Sign Out: Well controlled pain   PONV: No   Neuro/Psych: Uneventful            Sign Out: Acceptable/Baseline neuro status   Airway/Respiratory: Uneventful            Sign Out: Acceptable/Baseline resp. status   CV/Hemodynamics: Uneventful            Sign Out: Acceptable CV status   Other NRE: NONE   DID A NON-ROUTINE EVENT OCCUR? No         Last vitals:  Vitals:    02/08/21 1030 02/08/21 1055 02/08/21 1100   BP: 116/76 (P) 115/73 115/75   Pulse: 62 (P) 65 68   Resp: 16 (P) 18 17   Temp: 36.2  C (97.1  F)  36.2  C (97.2  F)   SpO2: 100% (P) 100% 99%       Last vitals prior to Anesthesia Care Transfer:  CRNA VITALS  2/8/2021 0955 - 2/8/2021 1055      2/8/2021             NIBP:  119/76    Pulse:  70    NIBP Mean:  95    Ht Rate:  70    SpO2:  96 %    Resp Rate (set):  10          Electronically Signed By: Mike Rodriguez MD  February 8, 2021  1:33 PM

## 2021-02-08 NOTE — PROGRESS NOTES
PostOp, left eye, day 0    Doing well  Keep patch in place at night for 5 days  Start post-operative drops and taper according to instructions  Post-operative do's and don'ts reviewed, questions answered    Recheck 2-3 weeks with refraction    Johny Leyva MD  Ophthalmology Resident, PGY-3    .Not seen by staff during this visit, available should need have arisen.  Plan appropriate as above.    Luis Barkley MD  , Comprehensive Ophthalmology  Department of Ophthalmology and Visual Neurosciences  [facility]

## 2021-02-09 ENCOUNTER — VIRTUAL VISIT (OUTPATIENT)
Dept: ONCOLOGY | Facility: CLINIC | Age: 61
End: 2021-02-09
Attending: INTERNAL MEDICINE
Payer: COMMERCIAL

## 2021-02-09 VITALS — BODY MASS INDEX: 23 KG/M2 | HEIGHT: 62 IN | WEIGHT: 125 LBS

## 2021-02-09 DIAGNOSIS — T45.1X5A CHEMOTHERAPY-INDUCED NEUTROPENIA (H): ICD-10-CM

## 2021-02-09 DIAGNOSIS — D70.1 CHEMOTHERAPY-INDUCED NEUTROPENIA (H): ICD-10-CM

## 2021-02-09 DIAGNOSIS — C50.912 BILATERAL MALIGNANT NEOPLASM OF BREAST IN FEMALE, UNSPECIFIED ESTROGEN RECEPTOR STATUS, UNSPECIFIED SITE OF BREAST (H): ICD-10-CM

## 2021-02-09 DIAGNOSIS — C50.911 BILATERAL MALIGNANT NEOPLASM OF BREAST IN FEMALE, UNSPECIFIED ESTROGEN RECEPTOR STATUS, UNSPECIFIED SITE OF BREAST (H): ICD-10-CM

## 2021-02-09 DIAGNOSIS — E87.6 HYPOKALEMIA: ICD-10-CM

## 2021-02-09 DIAGNOSIS — C79.51 BONE METASTASES: ICD-10-CM

## 2021-02-09 DIAGNOSIS — C50.919 METASTATIC BREAST CANCER: Primary | ICD-10-CM

## 2021-02-09 PROCEDURE — 99214 OFFICE O/P EST MOD 30 MIN: CPT | Mod: GT | Performed by: INTERNAL MEDICINE

## 2021-02-09 RX ORDER — ALBUTEROL SULFATE 90 UG/1
1-2 AEROSOL, METERED RESPIRATORY (INHALATION)
Status: CANCELLED
Start: 2021-02-17

## 2021-02-09 RX ORDER — ACETAMINOPHEN 325 MG/1
650 TABLET ORAL ONCE
Status: CANCELLED | OUTPATIENT
Start: 2021-02-17

## 2021-02-09 RX ORDER — NALOXONE HYDROCHLORIDE 0.4 MG/ML
.1-.4 INJECTION, SOLUTION INTRAMUSCULAR; INTRAVENOUS; SUBCUTANEOUS
Status: CANCELLED | OUTPATIENT
Start: 2021-02-17

## 2021-02-09 RX ORDER — METHYLPREDNISOLONE SODIUM SUCCINATE 125 MG/2ML
125 INJECTION, POWDER, LYOPHILIZED, FOR SOLUTION INTRAMUSCULAR; INTRAVENOUS
Status: CANCELLED
Start: 2021-02-17

## 2021-02-09 RX ORDER — DIPHENHYDRAMINE HCL 25 MG
50 CAPSULE ORAL ONCE
Status: CANCELLED | OUTPATIENT
Start: 2021-02-10

## 2021-02-09 RX ORDER — MEPERIDINE HYDROCHLORIDE 25 MG/ML
25 INJECTION INTRAMUSCULAR; INTRAVENOUS; SUBCUTANEOUS EVERY 30 MIN PRN
Status: CANCELLED | OUTPATIENT
Start: 2021-02-10

## 2021-02-09 RX ORDER — HEPARIN SODIUM,PORCINE 10 UNIT/ML
5 VIAL (ML) INTRAVENOUS
Status: CANCELLED | OUTPATIENT
Start: 2021-02-17

## 2021-02-09 RX ORDER — HEPARIN SODIUM (PORCINE) LOCK FLUSH IV SOLN 100 UNIT/ML 100 UNIT/ML
5 SOLUTION INTRAVENOUS
Status: CANCELLED | OUTPATIENT
Start: 2021-02-17

## 2021-02-09 RX ORDER — ATROPINE SULFATE 0.4 MG/ML
0.4 AMPUL (ML) INJECTION
Status: CANCELLED | OUTPATIENT
Start: 2021-02-17

## 2021-02-09 RX ORDER — HEPARIN SODIUM,PORCINE 10 UNIT/ML
5 VIAL (ML) INTRAVENOUS
Status: CANCELLED | OUTPATIENT
Start: 2021-02-10

## 2021-02-09 RX ORDER — ATROPINE SULFATE 0.4 MG/ML
0.4 AMPUL (ML) INJECTION
Status: CANCELLED | OUTPATIENT
Start: 2021-02-10

## 2021-02-09 RX ORDER — ALBUTEROL SULFATE 90 UG/1
1-2 AEROSOL, METERED RESPIRATORY (INHALATION)
Status: CANCELLED
Start: 2021-02-10

## 2021-02-09 RX ORDER — NALOXONE HYDROCHLORIDE 0.4 MG/ML
.1-.4 INJECTION, SOLUTION INTRAMUSCULAR; INTRAVENOUS; SUBCUTANEOUS
Status: CANCELLED | OUTPATIENT
Start: 2021-02-10

## 2021-02-09 RX ORDER — ALBUTEROL SULFATE 0.83 MG/ML
2.5 SOLUTION RESPIRATORY (INHALATION)
Status: CANCELLED | OUTPATIENT
Start: 2021-02-10

## 2021-02-09 RX ORDER — SODIUM CHLORIDE 9 MG/ML
1000 INJECTION, SOLUTION INTRAVENOUS CONTINUOUS PRN
Status: CANCELLED
Start: 2021-02-17

## 2021-02-09 RX ORDER — LORAZEPAM 2 MG/ML
0.5 INJECTION INTRAMUSCULAR EVERY 4 HOURS PRN
Status: CANCELLED
Start: 2021-02-17

## 2021-02-09 RX ORDER — DIPHENHYDRAMINE HCL 25 MG
50 CAPSULE ORAL ONCE
Status: CANCELLED | OUTPATIENT
Start: 2021-02-17

## 2021-02-09 RX ORDER — METHYLPREDNISOLONE SODIUM SUCCINATE 125 MG/2ML
125 INJECTION, POWDER, LYOPHILIZED, FOR SOLUTION INTRAMUSCULAR; INTRAVENOUS
Status: CANCELLED
Start: 2021-02-10

## 2021-02-09 RX ORDER — LORAZEPAM 2 MG/ML
0.5 INJECTION INTRAMUSCULAR EVERY 4 HOURS PRN
Status: CANCELLED
Start: 2021-02-10

## 2021-02-09 RX ORDER — HEPARIN SODIUM (PORCINE) LOCK FLUSH IV SOLN 100 UNIT/ML 100 UNIT/ML
5 SOLUTION INTRAVENOUS
Status: CANCELLED | OUTPATIENT
Start: 2021-02-10

## 2021-02-09 RX ORDER — SODIUM CHLORIDE 9 MG/ML
1000 INJECTION, SOLUTION INTRAVENOUS CONTINUOUS PRN
Status: CANCELLED
Start: 2021-02-10

## 2021-02-09 RX ORDER — DIPHENHYDRAMINE HYDROCHLORIDE 50 MG/ML
50 INJECTION INTRAMUSCULAR; INTRAVENOUS
Status: CANCELLED
Start: 2021-02-10

## 2021-02-09 RX ORDER — EPINEPHRINE 1 MG/ML
0.3 INJECTION, SOLUTION INTRAMUSCULAR; SUBCUTANEOUS EVERY 5 MIN PRN
Status: CANCELLED | OUTPATIENT
Start: 2021-02-10

## 2021-02-09 RX ORDER — MEPERIDINE HYDROCHLORIDE 25 MG/ML
25 INJECTION INTRAMUSCULAR; INTRAVENOUS; SUBCUTANEOUS EVERY 30 MIN PRN
Status: CANCELLED | OUTPATIENT
Start: 2021-02-17

## 2021-02-09 RX ORDER — OLANZAPINE 5 MG/1
TABLET ORAL
COMMUNITY
Start: 2021-02-04 | End: 2022-03-17

## 2021-02-09 RX ORDER — ALBUTEROL SULFATE 0.83 MG/ML
2.5 SOLUTION RESPIRATORY (INHALATION)
Status: CANCELLED | OUTPATIENT
Start: 2021-02-17

## 2021-02-09 RX ORDER — EPINEPHRINE 1 MG/ML
0.3 INJECTION, SOLUTION INTRAMUSCULAR; SUBCUTANEOUS EVERY 5 MIN PRN
Status: CANCELLED | OUTPATIENT
Start: 2021-02-17

## 2021-02-09 RX ORDER — ACETAMINOPHEN 325 MG/1
650 TABLET ORAL ONCE
Status: CANCELLED | OUTPATIENT
Start: 2021-02-10

## 2021-02-09 RX ORDER — METOCLOPRAMIDE 10 MG/1
TABLET ORAL
COMMUNITY
Start: 2021-02-01 | End: 2021-02-18

## 2021-02-09 RX ORDER — DIPHENHYDRAMINE HYDROCHLORIDE 50 MG/ML
50 INJECTION INTRAMUSCULAR; INTRAVENOUS
Status: CANCELLED
Start: 2021-02-17

## 2021-02-09 ASSESSMENT — PAIN SCALES - GENERAL: PAINLEVEL: NO PAIN (0)

## 2021-02-09 ASSESSMENT — MIFFLIN-ST. JEOR: SCORE: 1100.25

## 2021-02-09 NOTE — PATIENT INSTRUCTIONS
Chemo tomorrow    Will arrange for bone biopsy in the next few days    See me back in 3 weeks  xgeva monthly    Cont calcium and D

## 2021-02-09 NOTE — PROGRESS NOTES
ONCOLOGY FOLLOW UP NOTE: 2/9/2021        I took the history from reviewing the previous notes that she was following with Dr. Amaya.  I have copied and updated from prior notes.    ONCOLOGY History:    1.  January 2006:  Diagnosed with Stage IIB, T2 N1 M0 invasive lobular carcinoma of the right breast.  Final pathology showed a 4.5 x 3.8 x 2.5 cm, 01/14 lymph nodes positive.  Estrogen, progesterone receptor positive, HER-2 negative.     2.  Genetics.  BRCA1 and 2 mutations not detected. 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       THERAPY TO DATE:   1.  2006:  ECOG 2104 protocol of dose-dense Adriamycin, Cytoxan and Avastin x4 cycles followed by Taxol and Avastin x4 cycles.   2.  03/2007:  Completed 1 year Avastin.   3.  11/2006-01/2007:  Radiotherapy to the right breast of 5040 cGy.   4.  03/[id number]:  Aromasin.  Stopped after moving to the [facility].   5.  01/2016:  Right modified radical mastectomy with latissimus dorsi flap reconstruction and a left prophylactic mastectomy with latissimus dorsi flap reconstruction.     She recently had MRI of the brain as a follow-up for multiple sclerosis and there were multiple calvarial lesions noted which was suspicious for metastatic disease.  There was no evidence of new multiple sclerosis lesions.  She had a PET scan on 8/30/2019 which showed widespread bone metastatic lesions (calvarium, spine, sacrum, pelvis, ribs most prominent at C7, T3, L1 and L4), hypermetabolic 3 cm lesion in LLL, and mediastinal/hilar LN. CEA wnl at 1.9 and C27-29 elevated to 415 (28 on 8/23/16). A CT guided biopsy of the right iliac bone was obtained on 9/4/19, pathology consistent with metastatic adenocarcinoma (breast), ER/MI negative, HER-2 negative PDL 1 < 1%. A second biopsy was take of the LLL nodule via EBUS on 9/5/19 did not show any malignancy.    C/T/L spine MRI 8/3/19 to 9/2/19 with numerous enhancing lesions of the C, T and L spine, largest around T9 and L1. No evidence  of cord compression. Has a L1 compression fracture and impending L4.     Received radiation T12-L5 3000 cGy in 10 fractions from 9/6/2019 till 9/18/2019.  Neurosurgery recommended no surgical intervention but to wear Gorge brace when out of bed and HOB >30 degrees    She started palliative Xeloda 2000/1500 on 10/1/2019 but after just a few doses she noticed nausea, red eyes blurred vision, loss of appetite.  She stopped taking it after 5 doses and was seen by nurse practitioner on 10/7/2019 when she was improving.  At that point she was restarted on Xeloda at a lower dose of 1000 mg twice a day.  As she was not able to tolerate even the lower dose with extreme nausea and vomiting and feeling extremely fatigued and blurry vision, we decided on stopping Xeloda.    We decided on repeating scans and MRI brain on 10/25/2019 showed no intracranial metastatic disease.   Multiple enhancing calvarial lesions may be increased in number and are suggestive of metastatic disease. Thinner imaging on the current study may identify the smaller lesions and may be responsible for  identified more lesions.   There is a single focus of T2 hyperintense signal within the anterior right temporal lobe may represent interval demyelination. There is no evidence for active demyelination.    PET/CT on 11/1/2019 showed favorable response to therapy and Overall FDG uptake within scattered osseous metastases is decreased since 8/30/2019, particularly within the pelvis. Increased sclerotic appearance of L1 and L4 pathologic compression deformities, likely sequela of recently completed palliative radiation therapy.    No significant FDG uptake in previously demonstrated hypermetabolic mediastinal lymph nodes. Biopsy negative on 9/5/2019.  There is also decreased FDG uptake in the left lower lobe (biopsy negative for  malignancy), now with dense consolidation containing air bronchograms suggestive of infection versus aspiration.  There is diffuse  FDG uptake within the esophagus, consistent with esophagitis.    Because of intolerance to Xeloda we decided on switching to weekly paclitaxel on 11/5/2019.    C#2 Taxol 12/4/19- started with 70mg/m2 dose reduction    C#3 Taxol 12/30/2019     PET/CT on 1/24/2020 showed progression of the disease in some of the bone lesions including increase in size and lytic lesion within the vertebral body of C4 measuring 1 cm as opposed to 0.6 cm previously and a new central soft tissue nodule with SUV max 4.1 when previously it was non-hypermetabolic.  There is also some progression noted in the right proximal femur and right iliac bone.  There is decreased metabolic uptake in the right lamina of T9 with SUV max 4.7 when previously it was 8.0.  Other lesions are stable.    CA 27-29 also had increased significantly and it was 257 on 1/28/2020.    We decided on switching to eribulin on 1/28/2020.    C#2 2/18/2020  C#2 D#8 2/25/2020    C#3 D#1 3/18/2020 -delayed by 1 week as per patient's preference because she wanted to visit her family.    She had a repeat PET/CT on 3/27/2020 which showed stable size of the bone metastatic lesions although the FDG avidity was less, consistent with treatment response.    She had MRI of the thoracic spine on 4/3/2020 and it was compared to the one which was done in August 2019 and it showed increased size of several metastatic lesions most notably at T9 but also at T5-T7.  Other lesions at T10, T11 and T12 appeared improved.    CA 27-29 was also down to 222 on 3/18/2020.    Cycle #4 eribulin ( dose reduced to 1.2 mg/m2 )-4/7/2020-   Cycle #5 Eribulin ( 1.2 mg/m2 )- 4/28/2020   Cycle #6 Eribulin ( 1.2 mg/m2 )- 5/19/2020     Cycle #7 Eribulin ( 1.2 mg/m2 )- 6/9/2020    Cycle #8 Eribulin ( 1.2 mg/m2 )- 6/30/2020     She had a repeat PET scan on 7/17/2020 which showed incidental finding of new acute bilateral pulmonary embolism in the distal left main pulmonary artery extending in the left upper and  lower lobes and in the segmental and branch arteries in the right lower lobe.    It also showed mildly increased FDG avidity in the lytic lesion in the T9 lamina and right pedicle with SUV max 5.3, previously 3.9.  That is also slightly increased FDG uptake to the left anterior fifth rib at the costochondral junction SUV max 3.9, previously 2.5.  There is slightly decreased FDG uptake in the right femoral neck lesion SUV max 2.2, previously 2.9.  FDG uptake in other bone lesions is fairly stable.  No new metastasis are seen.    CA 27-29 was 308 on 6/30/2020.  It was 286 on 5/19/2020.    Overall this is consistent with only slight progression versus stable disease.    She was started on Lovenox.    Cycle #9 eribulin 7/21/2020. CA 27-29 was 238    C#10 eribulin 8/18/2020. ( delayed by one week for ANC 0.9 )    C#11 Eribulin 9/8/2020    C#12 Eribulin 9/29/2020     C#13 Eribulin 10/20/2020     PET scan on 11/6/2020 shows progression of the disease with increased FDG uptake in the lytic lesions in the T9/T10 and right posterolateral elements as well as increased FDG uptake in the previously known hypermetabolic right iliac bone lesion suggesting recurrence of previously treated metastasis.  There is new subtle lesion in the right manubrium.  There is also a new fracture in the anterolateral left fourth rib with associated uptake and this could be a pathologic fracture.  Healing fracture in the left anterior fifth rib lesion.  There is resolution of the left pulmonary emboli.  Decreased FDG uptake of the esophagus consistent with improving inflammation.    CA 27-29 on 10/20/2020 was also elevated at 489.    C#1 Trodelvy on 11/11/2020.  Cycle #2 Trodelvy 12/2/2020  Cycle number 2-day #8 Trodelvy 12/8/2020.    12/15/2020-12/16/2020.  She was admitted to the hospital with nausea vomiting and dehydration.  She had tachycardia and initial lactic acid of 5.  It decreased to 1.4 after 2 L of normal saline.  She also had  hypokalemia and ANC was 1.3.  She was treated with IV fluids.  Procalcitonin was negative.  CTA of the chest was negative for pulmonary embolism or pneumonia.  No bacterial infection was documented.  Her medication which she was initially unable to tolerate at home, were restarted and she was discharged home once started to feel better.      We delayed the start of cycle number 3 x 1 week and decrease the dose of chemotherapy by 25%.  She also had neutropenia with ANC of 1.0.      She started cycle number 3-day #1 on 12/29/2020.  CA 27-29 was 392.    Cycle number 3-day #8 1/5/2021.    C#4 Trodelvy 1/20/2021- 75% dose    2/5/2021.  PET/CT overall shows a good response to treatment with improvement of previously hypermetabolic lesions in the spine and pelvis and stability of other bone meta stasis.  There is a single lytic lesion in the right iliac bone which demonstrates slight Yimi increased FDG uptake and has SUV max 4.7 while previously it was SUV max 3.4.  There was diffuse wall thickening of the esophagus with uptake in the esophagus in the fundus of the stomach and this could be seen with inflammation.    Trodelvy cycle #5 planned for 2/10/2021.  75% of the dose.        Interval history.  This is a video visit.    I also reviewed outside records from Dr. Castillo, the oncologist she saw [facility].  Overall patient is doing well and tells me that the dose reduce chemotherapy is working better and she is tolerating it well.  She did have some nausea and vomiting 1 week ago when she was in [place] but has now recovered well.  Overall pain is under decent control with MS Contin and immediate release morphine sulfate.  She denies new swellings.  No diarrhea or constipation.  No change in the mild neuropathy.  Energy is the same.  No trouble breathing.  No fevers or infections.         ECOG 1    ROS:  Otherwise a comprehensive review of the system was negative.        I reviewed other history in  "epic as below.       PAST MEDICAL HISTORY:     1.  Breast cancer  2.  Multiple sclerosis.   3.  Depression.   4.  Migraines.   5.  Hypertension.   6.  Cholecystectomy and umbilical hernia repair.     SOCIAL HISTORY: She smoked for 5 years but quit many years ago.  Drinks alcohol socially.  She lives with her .  She is a teacher in middle school.       FAMILY HISTORY: She mentions that her mother had breast cancer at 49.  Both grandmothers had breast cancer but she is not sure of the age.  A couple of cousins have cancers.  Patient has 3 children who are healthy.    Current Outpatient Medications   Medication     acetaminophen (TYLENOL) 500 MG tablet     apixaban ANTICOAGULANT (ELIQUIS) 5 MG tablet     busPIRone (BUSPAR) 15 MG tablet     calcium citrate-vitamin D (CITRACAL) 315-250 MG-UNIT TABS     Cholecalciferol (VITAMIN D3 PO)     diclofenac (VOLTAREN) 1 % topical gel     loperamide (IMODIUM) 2 MG capsule     LORazepam (ATIVAN) 1 MG tablet     magnesium 250 MG tablet     metoclopramide (REGLAN) 10 MG tablet     morphine (MS CONTIN) 15 MG CR tablet     moxifloxacin (VIGAMOX) 0.5 % ophthalmic solution     naloxone (NARCAN) 1 mg/mL for intranasal kit (2 syringes with 2 mucosal atomizer device)     OLANZapine (ZYPREXA) 5 MG tablet     potassium chloride ER (KLOR-CON M) 10 MEQ CR tablet     prednisoLONE acetate (PRED FORTE) 1 % ophthalmic suspension     prochlorperazine (COMPAZINE) 10 MG tablet     propranolol ER (INDERAL LA) 80 MG 24 hr capsule     senna-docusate (SENOKOT-S/PERICOLACE) 8.6-50 MG tablet     syringe/needle, disp, (B-D INTEGRA SYRINGE) 23G X 1\" 3 ML MISC     traZODone (DESYREL) 50 MG tablet     venlafaxine (EFFEXOR-XR) 75 MG 24 hr capsule     No current facility-administered medications for this visit.         Allergies   Allergen Reactions     Bactrim [Sulfamethoxazole W/Trimethoprim]      Internal bleeding     Copaxone [Glatiramer] Hives          PHYSICAL EXAMINATION:     Ht 1.575 m (5' 2\")   " Wt 56.7 kg (125 lb)   BMI 22.86 kg/m    Wt Readings from Last 4 Encounters:   02/09/21 56.7 kg (125 lb)   02/08/21 56.7 kg (125 lb)   01/26/21 58.1 kg (128 lb)   01/20/21 58.9 kg (129 lb 12.8 oz)             Constitutional.  Does not seem to be in any acute distress.  Eyes.  No redness or discharge noted.  Respiratory.  Speaking in full sentences.  Breathing seems comfortable without any accessory use of muscles.    Skin.  Visualized his skin does not show any obvious rashes.  Musculoskeletal.  Range of motion for visualized areas is intact.  Neurological.  Alert and oriented x3.  Psychiatric.  Mood, mentation and affect are normal.  Decision making capacity is intact.      The rest of a comprehensive physical examination is deferred due to Public Health Emergency video visit restrictions.      Labs and Imaging.    Reviewed.  On 1/26/2021  CBC showed WBC 3.4 ANC 2.3.  Rest is unremarkable.   CMP unremarkable.  CA 27-29 was 454 on 1/20/2021.      2/5/2021.  PET/CT overall shows a good response to treatment with improvement of previously hypermetabolic lesions in the spine and pelvis and stability of other bone meta stasis.  There is a single lytic lesion in the right iliac bone which demonstrates slight Yimi increased FDG uptake and has SUV max 4.7 while previously it was SUV max 3.4.  There was diffuse wall thickening of the esophagus with uptake in the esophagus in the fundus of the stomach and this could be seen with inflammation.      ASSESSMENT AND PLAN:   1.  History of stage IIB, T2 N1 M0 invasive lobular carcinoma of the right breast.  It was initially diagnosed in 2006 and at that time it was hormone receptor positive, HER-2 negative.  She was treated on ECOG 2104 protocol with dose-dense Adriamycin, Cytoxan and Avastin x4 cycles followed by Taxol and Avastin x4 cycles followed by a Avastin for 1 year.  She also received radiation to the right breast which she completed in January 2007 (5040 cGy).  She took  Aromasin from 2007 to 2014.    Now she has metastatic breast cancer with extensive involvement of the bones.  There is also a left lower lobe hypermetabolic lesion and mediastinal lymph nodes which are hypermetabolic.  The biopsy from the right iliac bone is consistent with metastatic lobular breast cancer but it is hormone receptor and HER-2 negative and PDL 1 is also negative.    She has received 3000 cGy of palliative radiation to T12-L5 from 9/6/2019 till 9/18/2019.    She was started on palliative Xeloda but she was unable to tolerate even the dose reduced medication.        We decided on repeating scans and MRI brain on 10/25/2019 showed no intracranial metastatic disease.   Multiple enhancing calvarial lesions may be increased in number and are suggestive of metastatic disease. Thinner imaging on the current study may identify the smaller lesions and may be responsible for identified more lesions.   There is a single focus of T2 hyperintense signal within the anterior right temporal lobe may represent interval demyelination. There is no evidence for active demyelination.    PET/CT on 11/1/2019 showed favorable response to therapy and Overall FDG uptake within scattered osseous metastases is decreased since 8/30/2019, particularly within the pelvis. Increased sclerotic appearance of L1 and L4 pathologic compression deformities, likely sequela of recently completed palliative radiation therapy.    No significant FDG uptake in previously demonstrated hypermetabolic mediastinal lymph nodes. Biopsy negative on 9/5/2019.  There is also decreased FDG uptake in the left lower lobe (biopsy negative for malignancy), now with dense consolidation containing air bronchograms suggestive of infection versus aspiration.  There is diffuse FDG uptake within the esophagus, consistent with esophagitis.    Because of intolerance to Xeloda we decided on switching to weekly paclitaxel on 11/5/2019.    For better tolerance we  decreased the dose of paclitaxel to 70 mg/m  with cycle #2.  She has completed 3 cycles of Taxol and the last chemotherapy was on 1/14/2020.      PET/CT on 1/24/2020 showed progression of the disease in some of the bone lesions including increase in size and lytic lesion within the vertebral body of C4 measuring 1 cm as opposed to 0.6 cm previously and a new central soft tissue nodule with SUV max 4.1 when previously it was non-hypermetabolic.  There is also some progression noted in the right proximal femur and right iliac bone.  There is decreased metabolic uptake in the right lamina of T9 with SUV max 4.7 when previously it was 8.0.  Other lesions are stable.    There are no pathologically enlarged mediastinal, hilar or axillary lymph nodes. Calcified subcarinal lymph nodes. There are no suspicious lung nodules or evidence for infection and previous left lower lobe consolidation has resolved.     CA 27-29 also had increased significantly and it was 257 on 1/28/2020.    Due to progression we switched to eribulin on 1/28/2020.        After 3 cycles, she had a repeat PET/CT on 3/27/2020 which showed a stable size of the bone metastatic lesions although the FDG avidity was less, consistent with treatment response.    She had MRI of the thoracic spine on 4/3/2020 and it was compared to the one which was done in August 2019 and it showed increased size of several metastatic lesions most notably at T9 but also at T5-T7.  Other lesions at T10, T11 and T12 appeared improved.    CA 27-29 was also down to 222 on 3/18/2020.    I believe overall this is consistent with a partial response to the treatment.  Overall she is tolerating eribulin well with some worsening of neuropathy and cytopenias.     We decided on continuing the chemotherapy with some modifications and she got cycle #4 with slightly decreased dose of eribulin to 1.2 mg/m2.      After 8 cycles of eribulin repeat PET CT on 7/17/2020 showed only minimally  increased FDG uptake in T9 and T10 and in the left anterior fifth rib near the costochondral junction.  That is slightly decreased FDG avidity in the right femoral neck lesion.  Otherwise bone disease is a stable.  No other evidence of metastatic disease is found.    CA 27-29 on 6/30/2020 was 308.    We decided on continuing the same chemotherapy because overall clinical picture was consistent with stable to only minimally progressive disease and she was tolerating treatments well with decent quality of life.    Cycle #10 was delayed by 1 week because ANC was 0.9.        After 13 cycles of eribulin, PET scan on 11/6/2020 shows progression of the disease with increased FDG uptake in the lytic lesions in the T9/T10 and right posterolateral elements as well as increased FDG uptake in the previously known hypermetabolic right iliac bone lesion suggesting recurrence of previously treated metastasis.  There is new subtle lesion in the right manubrium.  There is also a new fracture in the anterolateral left fourth rib with associated uptake and this could be a pathologic fracture.  Healing fracture in the left anterior fifth rib lesion.  There is resolution of the left pulmonary emboli.  Decreased FDG uptake of the esophagus consistent with improving inflammation.    Because of progression of the disease, I recommend switching to recently FDA approved sacituzumab govitecan-hziy (Trodelvy) for TNBC, based on the results of the phase II IMMU-132-01 clinical trial.   We discussed the rational schedule and potential side effects of it in detail.    We gave it to her on 11/11/2020.    She had significant nausea and vomiting with it but otherwise tolerated it well.   She started cycle #2 on 12/2/2020 and received day 8 on 12/8/2020.  She then got admitted for nausea vomiting dehydration and weakness.     We delayed the start of cycle number 3 x 1 week and decrease the dose of chemotherapy by 25% and she also has neutropenia with  ANC of 1.  She started cycle number 3-day #1 on 12/29/2020.    CA 27-29 was 392.    Cycle number 3-day #8 1/5/2021.  She has been tolerating the reduced dose chemotherapy well.  After 4 cycles of chemotherapy, overall the PET scan shows positive response to treatment apart from mildly increased FDG avidity in one of the right iliac bone lesions.  CA 27-29 was 454 slightly elevated from before.    Overall she is doing well so we will plan to continue the same chemotherapy.    She obtained a second opinion from Dr. Castillo from [facility] who recommended a repeat biopsy to be done to make sure that she really has triple negative breast cancer.  I think it is reasonable and we will arrange for the bone biopsies on that lesion which has shown FDG avidity.  We will try to repeat hormone receptor studies, HER-2/sheldon and also test for foundation 1 or Caris for mutation analysis.  If she is found to have hormone receptor positive disease then potentially she could be eligible for a lot of other treatments.    Nausea vomiting.  This has been better after decreasing the dose of the chemotherapy.  She did have nausea vomiting 1 week ago when she was in [place] but now she has recovered.  For now we will continue Emend Decadron and Zofran and she will continue to take Compazine as needed      Bone disease.  She has metastatic disease to the bone.  Previously she got palliative radiation to T12-L5 3000 cGy in 10 fractions from 9/6/2019 till 9/18/2019.  She was evaluated by orthopedics Dr. Bipin Elliott on 11/1/2019 and conservative management was recommended.    She was on Zometa every 3 months. She last received on 9/29/2020.  Because of progression of the disease in the bones, I recommended switching to Xgeva which she received on 11/11/2020.  She then got Xgeva on 12/22/2020 and 1/20/2021.  Continue to receive every month.  Continue vitamin D and calcium.    As mentioned above we will try to do a bone  biopsy in the next few days of the right eyelid bone lesion which has shown some increased FDG avidity as compared to before.    Leukopenia/Neutropenia.  We delayed chemotherapy by 1 week and also dose reduced it by 25% starting cycle #3.    ANC is now normal.  Continue to serially monitor.      Pain management.  Continue to follow with palliative care.  Continue MS Contin and morphine sulfate immediate release.      Wall thickening of esophagus and FDG uptake of fundus of the stomach.  It could be in the setting of inflammation from recent nausea and vomiting.  Symptoms have resolved.  Continue to monitor clinically.      Subcutaneous nodule in the scalp.  I am not certain whether it is a cyst or a metastatic deposit.  PET scan does not cause this to light up.  Continue to observe.        Bilateral pulmonary emboli.  Continue Eliquis for incidentally found PE on 7/17/2020.         Neuropathy.  She has mild neuropathy which has not worsened on Trodelvy.  Continue to monitor.  Because of her underlying multiple sclerosis she has chronic balance issues and heat and cold sensitivity.    Vision changes.    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  It was thought that this could be due to Taxol as patient reported to the ophthalmologist that she was on Taxol in September 2019 when her symptoms started.  But she was not on Taxol in September 2019 when she started noticing the visual changes so Taxol is not responsible for this.  The first dose of Taxol was on 11/5/2019.   She had left cataract extraction on 2/8/2021 and she has noticed significant improvement in her vision.  She plans to do cataract extraction of the right eye in couple of weeks.          We did not address the following today.    Sleeping problems.  Continue trazodone.    Increased FDG ability in the colon.  She had FDG uptake in the cecum and ascending colon but no corresponding lesion was seen on the CT scan.  She  is completely asymptomatic so at this time we will continue to observe.    Discussion regarding healthcare directives.  On previous visit she told me that she will bring the health care directive for our records but she forgot so I told her to bring it on next visit.      Breast implant removal.  She tells me that she was planning to do breast implant removal because there was a recall on this.  Her surgery was scheduled for 9/24/2019.  Because of this new development of metastatic breast cancer and her recent radiation, surgery has been canceled.  We discussed that at this time treatment for metastatic breast cancer would take precedence over the other surgery as the other surgery would require her to be off chemotherapy for several weeks and in that case the chances of cancer progression would be high and I would not recommend that.    Multiple sclerosis.  She will continue to follow with her neurologist Dr. Quiles.  Currently multiple sclerosis is under control and currently she is not taking any medications.      I will see her back in 3 weeks.    All of her questions were answered to her satisfaction.  She is agreeable and comfortable with the plan.  Dewayne Zepeda MD    Video start time. 3:33 PM  Video stop time. 3:49 PM

## 2021-02-09 NOTE — NURSING NOTE
Shaneka is a 60 year old who is being evaluated via a billable video visit.      How would you like to obtain your AVS? MyChart  If the video visit is dropped, the invitation should be resent by: Text to cell phone: [phone]  Will anyone else be joining your video visit? No      Video-Visit Details    Type of service:  Video Visit      Originating Location (pt. Location): Home    Distant Location (provider location):  [facility]     Platform used for Video Visit: Well     SYMPTOM QUESTIONNAIRE    Pain: NO    Nausea/Vomiting: NO    Mouth Sores: NO    Shortness of Breath: NO    Smoking: NO    Fever or Chills: NO    Hard Stools: YES, TAKING SENNA    Soft Stools: NO    Weight Loss: NO    Weakness: NO    Burning, numbness or tingling in hands or feet: NO    Problems with skin or swelling: NO    Memory Loss: YES, CHEMO BRAIN    Anxiety or Depression: YES, CONTROLLED    Trouble Sleeping: NO    Rocío James CMA

## 2021-02-09 NOTE — LETTER
2/9/2021         RE: Shaneka Alvarez  [address] [facility] [address]        Dear Colleague,    Thank you for referring your patient, Shaneka Alvarez, to the [facility]. Please see a copy of my visit note below.      ONCOLOGY FOLLOW UP NOTE: 2/9/2021        I took the history from reviewing the previous notes that she was following with Dr. Amaya.  I have copied and updated from prior notes.    ONCOLOGY History:    1.  January 2006:  Diagnosed with Stage IIB, T2 N1 M0 invasive lobular carcinoma of the right breast.  Final pathology showed a 4.5 x 3.8 x 2.5 cm, 01/14 lymph nodes positive.  Estrogen, progesterone receptor positive, HER-2 negative.     2.  Genetics.  BRCA1 and 2 mutations not detected. 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       THERAPY TO DATE:   1. 2006:  ECOG 2104 protocol of dose-dense Adriamycin, Cytoxan and Avastin x4 cycles followed by Taxol and Avastin x4 cycles.   2.  03/2007:  Completed 1 year Avastin.   3.  11/2006-01/2007:  Radiotherapy to the right breast of 5040 cGy.   4.  [id number]:  Aromasin.  Stopped after moving to the [facility].   5.  01/2016:  Right modified radical mastectomy with latissimus dorsi flap reconstruction and a left prophylactic mastectomy with latissimus dorsi flap reconstruction.     She recently had MRI of the brain as a follow-up for multiple sclerosis and there were multiple calvarial lesions noted which was suspicious for metastatic disease.  There was no evidence of new multiple sclerosis lesions.  She had a PET scan on 8/30/2019 which showed widespread bone metastatic lesions (calvarium, spine, sacrum, pelvis, ribs most prominent at C7, T3, L1 and L4), hypermetabolic 3 cm lesion in LLL, and mediastinal/hilar LN. CEA wnl at 1.9 and C27-29 elevated to 415 (28 on 8/23/16). A CT guided biopsy of the right iliac bone was obtained on 9/4/19, pathology consistent with metastatic adenocarcinoma (breast),  ER/VA negative, HER-2 negative PDL 1 < 1%. A second biopsy was take of the LLL nodule via EBUS on 9/5/19 did not show any malignancy.    C/T/L spine MRI 8/3/19 to 9/2/19 with numerous enhancing lesions of the C, T and L spine, largest around T9 and L1. No evidence of cord compression. Has a L1 compression fracture and impending L4.     Received radiation T12-L5 3000 cGy in 10 fractions from 9/6/2019 till 9/18/2019.  Neurosurgery recommended no surgical intervention but to wear [place] brace when out of bed and HOB >30 degrees    She started palliative Xeloda 2000/1500 on 10/1/2019 but after just a few doses she noticed nausea, red eyes blurred vision, loss of appetite.  She stopped taking it after 5 doses and was seen by nurse practitioner on 10/7/2019 when she was improving.  At that point she was restarted on Xeloda at a lower dose of 1000 mg twice a day.  As she was not able to tolerate even the lower dose with extreme nausea and vomiting and feeling extremely fatigued and blurry vision, we decided on stopping Xeloda.    We decided on repeating scans and MRI brain on 10/25/2019 showed no intracranial metastatic disease.   Multiple enhancing calvarial lesions may be increased in number and are suggestive of metastatic disease. Thinner imaging on the current study may identify the smaller lesions and may be responsible for  identified more lesions.   There is a single focus of T2 hyperintense signal within the anterior right temporal lobe may represent interval demyelination. There is no evidence for active demyelination.    PET/CT on 11/1/2019 showed favorable response to therapy and Overall FDG uptake within scattered osseous metastases is decreased since 8/30/2019, particularly within the pelvis. Increased sclerotic appearance of L1 and L4 pathologic compression deformities, likely sequela of recently completed palliative radiation therapy.    No significant FDG uptake in previously demonstrated hypermetabolic  mediastinal lymph nodes. Biopsy negative on 9/5/2019.  There is also decreased FDG uptake in the left lower lobe (biopsy negative for  malignancy), now with dense consolidation containing air bronchograms suggestive of infection versus aspiration.  There is diffuse FDG uptake within the esophagus, consistent with esophagitis.    Because of intolerance to Xeloda we decided on switching to weekly paclitaxel on 11/5/2019.    C#2 Taxol 12/4/19- started with 70mg/m2 dose reduction    C#3 Taxol 12/30/2019     PET/CT on 1/24/2020 showed progression of the disease in some of the bone lesions including increase in size and lytic lesion within the vertebral body of C4 measuring 1 cm as opposed to 0.6 cm previously and a new central soft tissue nodule with SUV max 4.1 when previously it was non-hypermetabolic.  There is also some progression noted in the right proximal femur and right iliac bone.  There is decreased metabolic uptake in the right lamina of T9 with SUV max 4.7 when previously it was 8.0.  Other lesions are stable.    CA 27-29 also had increased significantly and it was 257 on 1/28/2020.    We decided on switching to eribulin on 1/28/2020.    C#2 2/18/2020  C#2 D#8 2/25/2020    C#3 D#1 3/18/2020 -delayed by 1 week as per patient's preference because she wanted to visit her family.    She had a repeat PET/CT on 3/27/2020 which showed stable size of the bone metastatic lesions although the FDG avidity was less, consistent with treatment response.    She had MRI of the thoracic spine on 4/3/2020 and it was compared to the one which was done in August 2019 and it showed increased size of several metastatic lesions most notably at T9 but also at T5-T7.  Other lesions at T10, T11 and T12 appeared improved.    CA 27-29 was also down to 222 on 3/18/2020.    Cycle #4 eribulin ( dose reduced to 1.2 mg/m2 )-4/7/2020-   Cycle #5 Eribulin ( 1.2 mg/m2 )- 4/28/2020   Cycle #6 Eribulin ( 1.2 mg/m2 )- 5/19/2020     Cycle #7  Eribulin ( 1.2 mg/m2 )- 6/9/2020    Cycle #8 Eribulin ( 1.2 mg/m2 )- 6/30/2020     She had a repeat PET scan on 7/17/2020 which showed incidental finding of new acute bilateral pulmonary embolism in the distal left main pulmonary artery extending in the left upper and lower lobes and in the segmental and branch arteries in the right lower lobe.    It also showed mildly increased FDG avidity in the lytic lesion in the T9 lamina and right pedicle with SUV max 5.3, previously 3.9.  That is also slightly increased FDG uptake to the left anterior fifth rib at the costochondral junction SUV max 3.9, previously 2.5.  There is slightly decreased FDG uptake in the right femoral neck lesion SUV max 2.2, previously 2.9.  FDG uptake in other bone lesions is fairly stable.  No new metastasis are seen.    CA 27-29 was 308 on 6/30/2020.  It was 286 on 5/19/2020.    Overall this is consistent with only slight progression versus stable disease.    She was started on Lovenox.    Cycle #9 eribulin 7/21/2020. CA 27-29 was 238    C#10 eribulin 8/18/2020. ( delayed by one week for ANC 0.9 )    C#11 Eribulin 9/8/2020    C#12 Eribulin 9/29/2020     C#13 Eribulin 10/20/2020     PET scan on 11/6/2020 shows progression of the disease with increased FDG uptake in the lytic lesions in the T9/T10 and right posterolateral elements as well as increased FDG uptake in the previously known hypermetabolic right iliac bone lesion suggesting recurrence of previously treated metastasis.  There is new subtle lesion in the right manubrium.  There is also a new fracture in the anterolateral left fourth rib with associated uptake and this could be a pathologic fracture.  Healing fracture in the left anterior fifth rib lesion.  There is resolution of the left pulmonary emboli.  Decreased FDG uptake of the esophagus consistent with improving inflammation.    CA 27-29 on 10/20/2020 was also elevated at 489.    C#1 Trodelvy on 11/11/2020.  Cycle #2 Trodelvy  12/2/2020  Cycle number 2-day #8 Trodelvy 12/8/2020.    12/15/2020-12/16/2020.  She was admitted to the hospital with nausea vomiting and dehydration.  She had tachycardia and initial lactic acid of 5.  It decreased to 1.4 after 2 L of normal saline.  She also had hypokalemia and ANC was 1.3.  She was treated with IV fluids.  Procalcitonin was negative.  CTA of the chest was negative for pulmonary embolism or pneumonia.  No bacterial infection was documented.  Her medication which she was initially unable to tolerate at home, were restarted and she was discharged home once started to feel better.      We delayed the start of cycle number 3 x 1 week and decrease the dose of chemotherapy by 25%.  She also had neutropenia with ANC of 1.0.      She started cycle number 3-day #1 on 12/29/2020.  CA 27-29 was 392.    Cycle number 3-day #8 1/5/2021.    C#4 Trodelvy 1/20/2021- 75% dose    2/5/2021.  PET/CT overall shows a good response to treatment with improvement of previously hypermetabolic lesions in the spine and pelvis and stability of other bone meta stasis.  There is a single lytic lesion in the right iliac bone which demonstrates slight Yimi increased FDG uptake and has SUV max 4.7 while previously it was SUV max 3.4.  There was diffuse wall thickening of the esophagus with uptake in the esophagus in the fundus of the stomach and this could be seen with inflammation.    Trodelvy cycle #5 planned for 2/10/2021.  75% of the dose.        Interval history.  This is a video visit.    I also reviewed outside records from Dr. Castillo, the oncologist she saw at [facility].  Overall patient is doing well and tells me that the dose reduce chemotherapy is working better and she is tolerating it well.  She did have some nausea and vomiting 1 week ago when she was in [place] but has now recovered well.  Overall pain is under decent control with MS Contin and immediate release morphine sulfate.  She denies new  "swellings.  No diarrhea or constipation.  No change in the mild neuropathy.  Energy is the same.  No trouble breathing.  No fevers or infections.         ECOG 1    ROS:  Otherwise a comprehensive review of the system was negative.        I reviewed other history in epic as below.       PAST MEDICAL HISTORY:     1.  Breast cancer  2.  Multiple sclerosis.   3.  Depression.   4.  Migraines.   5.  Hypertension.   6.  Cholecystectomy and umbilical hernia repair.     SOCIAL HISTORY: She smoked for 5 years but quit many years ago.  Drinks alcohol socially.  She lives with her .  She is a teacher in middle school.       FAMILY HISTORY: She mentions that her mother had breast cancer at 49.  Both grandmothers had breast cancer but she is not sure of the age.  A couple of cousins have cancers.  Patient has 3 children who are healthy.    Current Outpatient Medications   Medication     acetaminophen (TYLENOL) 500 MG tablet     apixaban ANTICOAGULANT (ELIQUIS) 5 MG tablet     busPIRone (BUSPAR) 15 MG tablet     calcium citrate-vitamin D (CITRACAL) 315-250 MG-UNIT TABS     Cholecalciferol (VITAMIN D3 PO)     diclofenac (VOLTAREN) 1 % topical gel     loperamide (IMODIUM) 2 MG capsule     LORazepam (ATIVAN) 1 MG tablet     magnesium 250 MG tablet     metoclopramide (REGLAN) 10 MG tablet     morphine (MS CONTIN) 15 MG CR tablet     moxifloxacin (VIGAMOX) 0.5 % ophthalmic solution     naloxone (NARCAN) 1 mg/mL for intranasal kit (2 syringes with 2 mucosal atomizer device)     OLANZapine (ZYPREXA) 5 MG tablet     potassium chloride ER (KLOR-CON M) 10 MEQ CR tablet     prednisoLONE acetate (PRED FORTE) 1 % ophthalmic suspension     prochlorperazine (COMPAZINE) 10 MG tablet     propranolol ER (INDERAL LA) 80 MG 24 hr capsule     senna-docusate (SENOKOT-S/PERICOLACE) 8.6-50 MG tablet     syringe/needle, disp, (B-D INTEGRA SYRINGE) 23G X 1\" 3 ML MISC     traZODone (DESYREL) 50 MG tablet     venlafaxine (EFFEXOR-XR) 75 MG 24 hr " "capsule     No current facility-administered medications for this visit.         Allergies   Allergen Reactions     Bactrim [Sulfamethoxazole W/Trimethoprim]      Internal bleeding     Copaxone [Glatiramer] Hives          PHYSICAL EXAMINATION:     Ht 1.575 m (5' 2\")   Wt 56.7 kg (125 lb)   BMI 22.86 kg/m    Wt Readings from Last 4 Encounters:   02/09/21 56.7 kg (125 lb)   02/08/21 56.7 kg (125 lb)   01/26/21 58.1 kg (128 lb)   01/20/21 58.9 kg (129 lb 12.8 oz)             Constitutional.  Does not seem to be in any acute distress.  Eyes.  No redness or discharge noted.  Respiratory.  Speaking in full sentences.  Breathing seems comfortable without any accessory use of muscles.    Skin.  Visualized his skin does not show any obvious rashes.  Musculoskeletal.  Range of motion for visualized areas is intact.  Neurological.  Alert and oriented x3.  Psychiatric.  Mood, mentation and affect are normal.  Decision making capacity is intact.      The rest of a comprehensive physical examination is deferred due to Public Health Emergency video visit restrictions.      Labs and Imaging.    Reviewed.  On 1/26/2021  CBC showed WBC 3.4 ANC 2.3.  Rest is unremarkable.   CMP unremarkable.  CA 27-29 was 454 on 1/20/2021.      2/5/2021.  PET/CT overall shows a good response to treatment with improvement of previously hypermetabolic lesions in the spine and pelvis and stability of other bone meta stasis.  There is a single lytic lesion in the right iliac bone which demonstrates slight Yimi increased FDG uptake and has SUV max 4.7 while previously it was SUV max 3.4.  There was diffuse wall thickening of the esophagus with uptake in the esophagus in the fundus of the stomach and this could be seen with inflammation.      ASSESSMENT AND PLAN:   1.  History of stage IIB, T2 N1 M0 invasive lobular carcinoma of the right breast.  It was initially diagnosed in 2006 and at that time it was hormone receptor positive, HER-2 negative.  She was " treated on ECOG 2104 protocol with dose-dense Adriamycin, Cytoxan and Avastin x4 cycles followed by Taxol and Avastin x4 cycles followed by a Avastin for 1 year.  She also received radiation to the right breast which she completed in January 2007 (5040 cGy).  She took Aromasin from 2007 to 2014.    Now she has metastatic breast cancer with extensive involvement of the bones.  There is also a left lower lobe hypermetabolic lesion and mediastinal lymph nodes which are hypermetabolic.  The biopsy from the right iliac bone is consistent with metastatic lobular breast cancer but it is hormone receptor and HER-2 negative and PDL 1 is also negative.    She has received 3000 cGy of palliative radiation to T12-L5 from 9/6/2019 till 9/18/2019.    She was started on palliative Xeloda but she was unable to tolerate even the dose reduced medication.        We decided on repeating scans and MRI brain on 10/25/2019 showed no intracranial metastatic disease.   Multiple enhancing calvarial lesions may be increased in number and are suggestive of metastatic disease. Thinner imaging on the current study may identify the smaller lesions and may be responsible for identified more lesions.   There is a single focus of T2 hyperintense signal within the anterior right temporal lobe may represent interval demyelination. There is no evidence for active demyelination.    PET/CT on 11/1/2019 showed favorable response to therapy and Overall FDG uptake within scattered osseous metastases is decreased since 8/30/2019, particularly within the pelvis. Increased sclerotic appearance of L1 and L4 pathologic compression deformities, likely sequela of recently completed palliative radiation therapy.    No significant FDG uptake in previously demonstrated hypermetabolic mediastinal lymph nodes. Biopsy negative on 9/5/2019.  There is also decreased FDG uptake in the left lower lobe (biopsy negative for malignancy), now with dense consolidation containing  air bronchograms suggestive of infection versus aspiration.  There is diffuse FDG uptake within the esophagus, consistent with esophagitis.    Because of intolerance to Xeloda we decided on switching to weekly paclitaxel on 11/5/2019.    For better tolerance we decreased the dose of paclitaxel to 70 mg/m  with cycle #2.  She has completed 3 cycles of Taxol and the last chemotherapy was on 1/14/2020.      PET/CT on 1/24/2020 showed progression of the disease in some of the bone lesions including increase in size and lytic lesion within the vertebral body of C4 measuring 1 cm as opposed to 0.6 cm previously and a new central soft tissue nodule with SUV max 4.1 when previously it was non-hypermetabolic.  There is also some progression noted in the right proximal femur and right iliac bone.  There is decreased metabolic uptake in the right lamina of T9 with SUV max 4.7 when previously it was 8.0.  Other lesions are stable.    There are no pathologically enlarged mediastinal, hilar or axillary lymph nodes. Calcified subcarinal lymph nodes. There are no suspicious lung nodules or evidence for infection and previous left lower lobe consolidation has resolved.     CA 27-29 also had increased significantly and it was 257 on 1/28/2020.    Due to progression we switched to eribulin on 1/28/2020.        After 3 cycles, she had a repeat PET/CT on 3/27/2020 which showed a stable size of the bone metastatic lesions although the FDG avidity was less, consistent with treatment response.    She had MRI of the thoracic spine on 4/3/2020 and it was compared to the one which was done in August 2019 and it showed increased size of several metastatic lesions most notably at T9 but also at T5-T7.  Other lesions at T10, T11 and T12 appeared improved.    CA 27-29 was also down to 222 on 3/18/2020.    I believe overall this is consistent with a partial response to the treatment.  Overall she is tolerating eribulin well with some worsening of  neuropathy and cytopenias.     We decided on continuing the chemotherapy with some modifications and she got cycle #4 with slightly decreased dose of eribulin to 1.2 mg/m2.      After 8 cycles of eribulin repeat PET CT on 7/17/2020 showed only minimally increased FDG uptake in T9 and T10 and in the left anterior fifth rib near the costochondral junction.  That is slightly decreased FDG avidity in the right femoral neck lesion.  Otherwise bone disease is a stable.  No other evidence of metastatic disease is found.    CA 27-29 on 6/30/2020 was 308.    We decided on continuing the same chemotherapy because overall clinical picture was consistent with stable to only minimally progressive disease and she was tolerating treatments well with decent quality of life.    Cycle #10 was delayed by 1 week because ANC was 0.9.        After 13 cycles of eribulin, PET scan on 11/6/2020 shows progression of the disease with increased FDG uptake in the lytic lesions in the T9/T10 and right posterolateral elements as well as increased FDG uptake in the previously known hypermetabolic right iliac bone lesion suggesting recurrence of previously treated metastasis.  There is new subtle lesion in the right manubrium.  There is also a new fracture in the anterolateral left fourth rib with associated uptake and this could be a pathologic fracture.  Healing fracture in the left anterior fifth rib lesion.  There is resolution of the left pulmonary emboli.  Decreased FDG uptake of the esophagus consistent with improving inflammation.    Because of progression of the disease, I recommend switching to recently FDA approved sacituzumab govitecan-hziy (Trodelvy) for TNBC, based on the results of the phase II IMMU-132-01 clinical trial.   We discussed the rational schedule and potential side effects of it in detail.    We gave it to her on 11/11/2020.    She had significant nausea and vomiting with it but otherwise tolerated it well.   She started  cycle #2 on 12/2/2020 and received day 8 on 12/8/2020.  She then got admitted for nausea vomiting dehydration and weakness.     We delayed the start of cycle number 3 x 1 week and decrease the dose of chemotherapy by 25% and she also has neutropenia with ANC of 1.  She started cycle number 3-day #1 on 12/29/2020.    CA 27-29 was 392.    Cycle number 3-day #8 1/5/2021.  She has been tolerating the reduced dose chemotherapy well.  After 4 cycles of chemotherapy, overall the PET scan shows positive response to treatment apart from mildly increased FDG avidity in one of the right iliac bone lesions.  CA 27-29 was 454 slightly elevated from before.    Overall she is doing well so we will plan to continue the same chemotherapy.    She obtained a second opinion from Dr. Castillo from [facility] who recommended a repeat biopsy to be done to make sure that she really has triple negative breast cancer.  I think it is reasonable and we will arrange for the bone biopsies on that lesion which has shown FDG avidity.  We will try to repeat hormone receptor studies, HER-2/sheldon and also test for foundation 1 or Caris for mutation analysis.  If she is found to have hormone receptor positive disease then potentially she could be eligible for a lot of other treatments.    Nausea vomiting.  This has been better after decreasing the dose of the chemotherapy.  She did have nausea vomiting 1 week ago when she was in [place] but now she has recovered.  For now we will continue Emend Decadron and Zofran and she will continue to take Compazine as needed      Bone disease.  She has metastatic disease to the bone.  Previously she got palliative radiation to T12-L5 3000 cGy in 10 fractions from 9/6/2019 till 9/18/2019.  She was evaluated by orthopedics Dr. Bipin Elliott on 11/1/2019 and conservative management was recommended.    She was on Zometa every 3 months. She last received on 9/29/2020.  Because of progression of the  disease in the bones, I recommended switching to Xgeva which she received on 11/11/2020.  She then got Xgeva on 12/22/2020 and 1/20/2021.  Continue to receive every month.  Continue vitamin D and calcium.    As mentioned above we will try to do a bone biopsy in the next few days of the right eyelid bone lesion which has shown some increased FDG avidity as compared to before.    Leukopenia/Neutropenia.  We delayed chemotherapy by 1 week and also dose reduced it by 25% starting cycle #3.    ANC is now normal.  Continue to serially monitor.      Pain management.  Continue to follow with palliative care.  Continue MS Contin and morphine sulfate immediate release.      Wall thickening of esophagus and FDG uptake of fundus of the stomach.  It could be in the setting of inflammation from recent nausea and vomiting.  Symptoms have resolved.  Continue to monitor clinically.      Subcutaneous nodule in the scalp.  I am not certain whether it is a cyst or a metastatic deposit.  PET scan does not cause this to light up.  Continue to observe.        Bilateral pulmonary emboli.  Continue Eliquis for incidentally found PE on 7/17/2020.         Neuropathy.  She has mild neuropathy which has not worsened on Trodelvy.  Continue to monitor.  Because of her underlying multiple sclerosis she has chronic balance issues and heat and cold sensitivity.    Vision changes.    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  It was thought that this could be due to Taxol as patient reported to the ophthalmologist that she was on Taxol in September 2019 when her symptoms started.  But she was not on Taxol in September 2019 when she started noticing the visual changes so Taxol is not responsible for this.  The first dose of Taxol was on 11/5/2019.   She had left cataract extraction on 2/8/2021 and she has noticed significant improvement in her vision.  She plans to do cataract extraction of the right eye in couple  of weeks.          We did not address the following today.    Sleeping problems.  Continue trazodone.    Increased FDG ability in the colon.  She had FDG uptake in the cecum and ascending colon but no corresponding lesion was seen on the CT scan.  She is completely asymptomatic so at this time we will continue to observe.    Discussion regarding healthcare directives.  On previous visit she told me that she will bring the health care directive for our records but she forgot so I told her to bring it on next visit.      Breast implant removal.  She tells me that she was planning to do breast implant removal because there was a recall on this.  Her surgery was scheduled for 9/24/2019.  Because of this new development of metastatic breast cancer and her recent radiation, surgery has been canceled.  We discussed that at this time treatment for metastatic breast cancer would take precedence over the other surgery as the other surgery would require her to be off chemotherapy for several weeks and in that case the chances of cancer progression would be high and I would not recommend that.    Multiple sclerosis.  She will continue to follow with her neurologist Dr. Quiles.  Currently multiple sclerosis is under control and currently she is not taking any medications.      I will see her back in 3 weeks.    All of her questions were answered to her satisfaction.  She is agreeable and comfortable with the plan.  Dewayne Zepeda MD    Video start time. 3:33 PM  Video stop time. 3:49 PM              Again, thank you for allowing me to participate in the care of your patient.        Sincerely,        Dewayne Zepeda MD

## 2021-02-10 ENCOUNTER — OFFICE VISIT (OUTPATIENT)
Dept: ONCOLOGY | Facility: CLINIC | Age: 61
End: 2021-02-10
Payer: COMMERCIAL

## 2021-02-10 ENCOUNTER — INFUSION THERAPY VISIT (OUTPATIENT)
Dept: INFUSION THERAPY | Facility: CLINIC | Age: 61
End: 2021-02-10
Payer: COMMERCIAL

## 2021-02-10 ENCOUNTER — TELEPHONE (OUTPATIENT)
Dept: ONCOLOGY | Facility: CLINIC | Age: 61
End: 2021-02-10

## 2021-02-10 VITALS
SYSTOLIC BLOOD PRESSURE: 115 MMHG | DIASTOLIC BLOOD PRESSURE: 74 MMHG | WEIGHT: 132.5 LBS | HEART RATE: 81 BPM | TEMPERATURE: 98.3 F | BODY MASS INDEX: 24.23 KG/M2 | RESPIRATION RATE: 18 BRPM | OXYGEN SATURATION: 100 %

## 2021-02-10 DIAGNOSIS — C50.919 METASTATIC BREAST CANCER: ICD-10-CM

## 2021-02-10 DIAGNOSIS — C50.912 BILATERAL MALIGNANT NEOPLASM OF BREAST IN FEMALE, UNSPECIFIED ESTROGEN RECEPTOR STATUS, UNSPECIFIED SITE OF BREAST (H): ICD-10-CM

## 2021-02-10 DIAGNOSIS — C50.911 BILATERAL MALIGNANT NEOPLASM OF BREAST IN FEMALE, UNSPECIFIED ESTROGEN RECEPTOR STATUS, UNSPECIFIED SITE OF BREAST (H): ICD-10-CM

## 2021-02-10 DIAGNOSIS — T45.1X5A CHEMOTHERAPY-INDUCED NEUTROPENIA (H): ICD-10-CM

## 2021-02-10 DIAGNOSIS — E87.6 HYPOKALEMIA: Primary | ICD-10-CM

## 2021-02-10 DIAGNOSIS — D70.1 CHEMOTHERAPY-INDUCED NEUTROPENIA (H): ICD-10-CM

## 2021-02-10 DIAGNOSIS — C50.919 METASTATIC BREAST CANCER: Primary | ICD-10-CM

## 2021-02-10 LAB
ALBUMIN SERPL-MCNC: 3.3 G/DL (ref 3.4–5)
ALP SERPL-CCNC: 50 U/L (ref 40–150)
ALT SERPL W P-5'-P-CCNC: 27 U/L (ref 0–50)
ANION GAP SERPL CALCULATED.3IONS-SCNC: 3 MMOL/L (ref 3–14)
AST SERPL W P-5'-P-CCNC: 13 U/L (ref 0–45)
BASOPHILS # BLD AUTO: 0 10E9/L (ref 0–0.2)
BASOPHILS NFR BLD AUTO: 0.9 %
BILIRUB SERPL-MCNC: 0.2 MG/DL (ref 0.2–1.3)
BUN SERPL-MCNC: 10 MG/DL (ref 7–30)
CALCIUM SERPL-MCNC: 8.8 MG/DL (ref 8.5–10.1)
CHLORIDE SERPL-SCNC: 110 MMOL/L (ref 94–109)
CO2 SERPL-SCNC: 27 MMOL/L (ref 20–32)
CREAT SERPL-MCNC: 0.51 MG/DL (ref 0.52–1.04)
DIFFERENTIAL METHOD BLD: ABNORMAL
EOSINOPHIL # BLD AUTO: 0.1 10E9/L (ref 0–0.7)
EOSINOPHIL NFR BLD AUTO: 1.7 %
ERYTHROCYTE [DISTWIDTH] IN BLOOD BY AUTOMATED COUNT: 16.6 % (ref 10–15)
GFR SERPL CREATININE-BSD FRML MDRD: >90 ML/MIN/{1.73_M2}
GLUCOSE SERPL-MCNC: 144 MG/DL (ref 70–99)
HCT VFR BLD AUTO: 35.8 % (ref 35–47)
HGB BLD-MCNC: 11.5 G/DL (ref 11.7–15.7)
IMM GRANULOCYTES # BLD: 0.1 10E9/L (ref 0–0.4)
IMM GRANULOCYTES NFR BLD: 1.5 %
LYMPHOCYTES # BLD AUTO: 0.5 10E9/L (ref 0.8–5.3)
LYMPHOCYTES NFR BLD AUTO: 15.5 %
MAGNESIUM SERPL-MCNC: 1.8 MG/DL (ref 1.6–2.3)
MCH RBC QN AUTO: 28.5 PG (ref 26.5–33)
MCHC RBC AUTO-ENTMCNC: 32.1 G/DL (ref 31.5–36.5)
MCV RBC AUTO: 89 FL (ref 78–100)
MONOCYTES # BLD AUTO: 0.4 10E9/L (ref 0–1.3)
MONOCYTES NFR BLD AUTO: 10.2 %
NEUTROPHILS # BLD AUTO: 2.4 10E9/L (ref 1.6–8.3)
NEUTROPHILS NFR BLD AUTO: 70.2 %
PHOSPHATE SERPL-MCNC: 3.2 MG/DL (ref 2.5–4.5)
PLATELET # BLD AUTO: 161 10E9/L (ref 150–450)
POTASSIUM SERPL-SCNC: 3.4 MMOL/L (ref 3.4–5.3)
PROT SERPL-MCNC: 6.1 G/DL (ref 6.8–8.8)
RBC # BLD AUTO: 4.04 10E12/L (ref 3.8–5.2)
SODIUM SERPL-SCNC: 140 MMOL/L (ref 133–144)
WBC # BLD AUTO: 3.4 10E9/L (ref 4–11)

## 2021-02-10 PROCEDURE — 80053 COMPREHEN METABOLIC PANEL: CPT | Performed by: INTERNAL MEDICINE

## 2021-02-10 PROCEDURE — 99207 PR NO CHARGE LOS: CPT

## 2021-02-10 PROCEDURE — 96413 CHEMO IV INFUSION 1 HR: CPT | Performed by: NURSE PRACTITIONER

## 2021-02-10 PROCEDURE — 85025 COMPLETE CBC W/AUTO DIFF WBC: CPT | Performed by: INTERNAL MEDICINE

## 2021-02-10 PROCEDURE — 96367 TX/PROPH/DG ADDL SEQ IV INF: CPT | Performed by: NURSE PRACTITIONER

## 2021-02-10 PROCEDURE — 96375 TX/PRO/DX INJ NEW DRUG ADDON: CPT | Performed by: NURSE PRACTITIONER

## 2021-02-10 PROCEDURE — 84100 ASSAY OF PHOSPHORUS: CPT | Performed by: INTERNAL MEDICINE

## 2021-02-10 PROCEDURE — 86300 IMMUNOASSAY TUMOR CA 15-3: CPT | Performed by: INTERNAL MEDICINE

## 2021-02-10 PROCEDURE — 83735 ASSAY OF MAGNESIUM: CPT | Performed by: INTERNAL MEDICINE

## 2021-02-10 PROCEDURE — S0028 INJECTION, FAMOTIDINE, 20 MG: HCPCS | Performed by: NURSE PRACTITIONER

## 2021-02-10 RX ORDER — HEPARIN SODIUM (PORCINE) LOCK FLUSH IV SOLN 100 UNIT/ML 100 UNIT/ML
5 SOLUTION INTRAVENOUS
Status: DISCONTINUED | OUTPATIENT
Start: 2021-02-10 | End: 2021-02-10 | Stop reason: HOSPADM

## 2021-02-10 RX ORDER — ACETAMINOPHEN 325 MG/1
650 TABLET ORAL ONCE
Status: COMPLETED | OUTPATIENT
Start: 2021-02-10 | End: 2021-02-10

## 2021-02-10 RX ORDER — DIPHENHYDRAMINE HCL 25 MG
50 CAPSULE ORAL ONCE
Status: COMPLETED | OUTPATIENT
Start: 2021-02-10 | End: 2021-02-10

## 2021-02-10 RX ORDER — HEPARIN SODIUM (PORCINE) LOCK FLUSH IV SOLN 100 UNIT/ML 100 UNIT/ML
5 SOLUTION INTRAVENOUS
Status: DISCONTINUED | OUTPATIENT
Start: 2021-02-10 | End: 2021-02-15 | Stop reason: HOSPADM

## 2021-02-10 RX ADMIN — HEPARIN SODIUM (PORCINE) LOCK FLUSH IV SOLN 100 UNIT/ML 5 ML: 100 SOLUTION at 13:24

## 2021-02-10 RX ADMIN — ACETAMINOPHEN 650 MG: 325 TABLET ORAL at 10:39

## 2021-02-10 RX ADMIN — HEPARIN SODIUM (PORCINE) LOCK FLUSH IV SOLN 100 UNIT/ML 5 ML: 100 SOLUTION at 10:05

## 2021-02-10 RX ADMIN — Medication 500 ML: at 10:50

## 2021-02-10 RX ADMIN — Medication 50 MG: at 10:40

## 2021-02-10 NOTE — PROGRESS NOTES
Outpatient IR Biopsy Referral    This is a 60 year old female with histroy of metastatic breast cancer. She had a prior IR biopsy of the right iliac bone lesion 2019 which came back HER2 PDL1 negative. She had a repeat PET CT 2/5/21 which showed increased FDG avid lesion in the right iliac bone. Request for IR biopsy of this lesion to test for HER 2, ER/VA, Foundation 1 to assess for triple negative breast cancer. Other bone lesions do not appear to be more FDG avid on current treatment.    Reviewed with Dr. Pierre. Lesion for target on series 5 image 361 and series 604 image 351 in the acetabulum.        Diagnostics ordered per request of Dr. Zepeda. Surg Path, Her 2/ ER/VA, Foundation one or Caris.     Message to IR schedulers to coordinate.    Kerry Hernandez PA-C  Interventional Radiology   IR on-call pager: [phone]

## 2021-02-10 NOTE — TELEPHONE ENCOUNTER
Per order this patient needs a Bone Biopsy scheduled. I called IR and they have placed this in Review and they will call us back to schedule once they review the patients chart.-cap-02/10/2021

## 2021-02-10 NOTE — PROGRESS NOTES
Infusion Nursing Note:  Shanekavi Alvarez presents today for C5D1 Trodelvy.    Patient seen by provider today: No, had Dr Zepeda visit on 2/9/2021   present during visit today: Not Applicable.    Note: N/A.  Patient declined the covid-19 test.    Intravenous Access:  Implanted Port.    Treatment Conditions:  Lab Results   Component Value Date    HGB 11.5 02/10/2021     Lab Results   Component Value Date    WBC 3.4 02/10/2021      Lab Results   Component Value Date    ANEU 2.4 02/10/2021     Lab Results   Component Value Date     02/10/2021      Lab Results   Component Value Date     02/10/2021                   Lab Results   Component Value Date    POTASSIUM 3.4 02/10/2021           Lab Results   Component Value Date    MAG 1.8 02/10/2021            Lab Results   Component Value Date    CR 0.51 02/10/2021                   Lab Results   Component Value Date    RAFAELA 8.8 02/10/2021                Lab Results   Component Value Date    BILITOTAL 0.2 02/10/2021           Lab Results   Component Value Date    ALBUMIN 3.3 02/10/2021                    Lab Results   Component Value Date    ALT 27 02/10/2021           Lab Results   Component Value Date    AST 13 02/10/2021       Results reviewed, labs MET treatment parameters, ok to proceed with treatment.      Post Infusion Assessment:  Patient tolerated infusion without incident.  Patient observed for 30 minutes post infusion per protocol.       Discharge Plan:   Patient discharged in stable condition accompanied by: self.    Bobbi Contreras RN

## 2021-02-10 NOTE — PROGRESS NOTES
See IV flow sheet for details of port access. Labs sent cbc, cmp, magnesium, phosphorus, ca27.29. IVAD needle left in place for chemotherapy to follow.    Keira Brooks RN

## 2021-02-11 DIAGNOSIS — C79.51 BONE METASTASES: Primary | ICD-10-CM

## 2021-02-11 DIAGNOSIS — C50.919 METASTATIC BREAST CANCER: ICD-10-CM

## 2021-02-11 DIAGNOSIS — Z11.59 ENCOUNTER FOR SCREENING FOR OTHER VIRAL DISEASES: Primary | ICD-10-CM

## 2021-02-11 LAB — CANCER AG27-29 SERPL-ACNC: 337 U/ML (ref 0–39)

## 2021-02-15 ENCOUNTER — PATIENT OUTREACH (OUTPATIENT)
Dept: ONCOLOGY | Facility: CLINIC | Age: 61
End: 2021-02-15

## 2021-02-15 DIAGNOSIS — G89.3 CANCER ASSOCIATED PAIN: ICD-10-CM

## 2021-02-15 RX ORDER — MORPHINE SULFATE 15 MG/1
15 TABLET, FILM COATED, EXTENDED RELEASE ORAL EVERY EVENING
Qty: 30 TABLET | Refills: 0 | Status: SHIPPED | OUTPATIENT
Start: 2021-02-15 | End: 2021-03-16

## 2021-02-15 NOTE — PROGRESS NOTES
Shaneka called to request refill on MS Contin 15 mg; she takes this once a day and states that along with Morphine IR, she can get a good night's sleep.  She states she missed recent follow-up visit with Dr. Whitaker and will reschedule.  She is also requesting refill for Buspirone, she states has been on this medication for years and has been filled by Dr. Rueda, who will not be practicing at [facility].  Advised writer will discuss with Dr. Whitaker about refilling this medication for her.

## 2021-02-16 NOTE — PROGRESS NOTES
Called back to patient and left message.  Dr. Whitaker has agreed to refill MS Contin, but defer to Dr. Rueda's office or PCP for Buspirone, since she has not filled that medication for patient.

## 2021-02-17 ENCOUNTER — OFFICE VISIT (OUTPATIENT)
Dept: ONCOLOGY | Facility: CLINIC | Age: 61
End: 2021-02-17
Payer: COMMERCIAL

## 2021-02-17 ENCOUNTER — INFUSION THERAPY VISIT (OUTPATIENT)
Dept: INFUSION THERAPY | Facility: CLINIC | Age: 61
End: 2021-02-17
Attending: INTERNAL MEDICINE
Payer: COMMERCIAL

## 2021-02-17 VITALS
DIASTOLIC BLOOD PRESSURE: 82 MMHG | TEMPERATURE: 99 F | OXYGEN SATURATION: 100 % | SYSTOLIC BLOOD PRESSURE: 119 MMHG | WEIGHT: 137.8 LBS | BODY MASS INDEX: 25.2 KG/M2 | HEART RATE: 84 BPM

## 2021-02-17 DIAGNOSIS — T45.1X5A CHEMOTHERAPY-INDUCED NEUTROPENIA (H): ICD-10-CM

## 2021-02-17 DIAGNOSIS — C50.919 METASTATIC BREAST CANCER: ICD-10-CM

## 2021-02-17 DIAGNOSIS — E87.6 HYPOKALEMIA: Primary | ICD-10-CM

## 2021-02-17 DIAGNOSIS — C79.51 BONE METASTASES: ICD-10-CM

## 2021-02-17 DIAGNOSIS — D70.1 CHEMOTHERAPY-INDUCED NEUTROPENIA (H): ICD-10-CM

## 2021-02-17 DIAGNOSIS — C50.912 BILATERAL MALIGNANT NEOPLASM OF BREAST IN FEMALE, UNSPECIFIED ESTROGEN RECEPTOR STATUS, UNSPECIFIED SITE OF BREAST (H): ICD-10-CM

## 2021-02-17 DIAGNOSIS — C50.911 BILATERAL MALIGNANT NEOPLASM OF BREAST IN FEMALE, UNSPECIFIED ESTROGEN RECEPTOR STATUS, UNSPECIFIED SITE OF BREAST (H): ICD-10-CM

## 2021-02-17 LAB
ALBUMIN SERPL-MCNC: 3.3 G/DL (ref 3.4–5)
ALP SERPL-CCNC: 65 U/L (ref 40–150)
ALT SERPL W P-5'-P-CCNC: 56 U/L (ref 0–50)
ANION GAP SERPL CALCULATED.3IONS-SCNC: 3 MMOL/L (ref 3–14)
AST SERPL W P-5'-P-CCNC: 31 U/L (ref 0–45)
BASOPHILS # BLD AUTO: 0 10E9/L (ref 0–0.2)
BASOPHILS NFR BLD AUTO: 0.8 %
BILIRUB SERPL-MCNC: 0.2 MG/DL (ref 0.2–1.3)
BUN SERPL-MCNC: 8 MG/DL (ref 7–30)
CALCIUM SERPL-MCNC: 9.4 MG/DL (ref 8.5–10.1)
CHLORIDE SERPL-SCNC: 105 MMOL/L (ref 94–109)
CO2 SERPL-SCNC: 32 MMOL/L (ref 20–32)
CREAT SERPL-MCNC: 0.63 MG/DL (ref 0.52–1.04)
DIFFERENTIAL METHOD BLD: ABNORMAL
EOSINOPHIL # BLD AUTO: 0.1 10E9/L (ref 0–0.7)
EOSINOPHIL NFR BLD AUTO: 3.4 %
ERYTHROCYTE [DISTWIDTH] IN BLOOD BY AUTOMATED COUNT: 17.2 % (ref 10–15)
GFR SERPL CREATININE-BSD FRML MDRD: >90 ML/MIN/{1.73_M2}
GLUCOSE SERPL-MCNC: 93 MG/DL (ref 70–99)
HCT VFR BLD AUTO: 35 % (ref 35–47)
HGB BLD-MCNC: 11.1 G/DL (ref 11.7–15.7)
IMM GRANULOCYTES # BLD: 0.1 10E9/L (ref 0–0.4)
IMM GRANULOCYTES NFR BLD: 3.6 %
LYMPHOCYTES # BLD AUTO: 0.7 10E9/L (ref 0.8–5.3)
LYMPHOCYTES NFR BLD AUTO: 19.3 %
MAGNESIUM SERPL-MCNC: 2 MG/DL (ref 1.6–2.3)
MCH RBC QN AUTO: 28.6 PG (ref 26.5–33)
MCHC RBC AUTO-ENTMCNC: 31.7 G/DL (ref 31.5–36.5)
MCV RBC AUTO: 90 FL (ref 78–100)
MONOCYTES # BLD AUTO: 0.5 10E9/L (ref 0–1.3)
MONOCYTES NFR BLD AUTO: 14.8 %
NEUTROPHILS # BLD AUTO: 2.1 10E9/L (ref 1.6–8.3)
NEUTROPHILS NFR BLD AUTO: 58.1 %
PHOSPHATE SERPL-MCNC: 4.7 MG/DL (ref 2.5–4.5)
PLATELET # BLD AUTO: 214 10E9/L (ref 150–450)
POTASSIUM SERPL-SCNC: 4.6 MMOL/L (ref 3.4–5.3)
PROT SERPL-MCNC: 6.4 G/DL (ref 6.8–8.8)
RBC # BLD AUTO: 3.88 10E12/L (ref 3.8–5.2)
SODIUM SERPL-SCNC: 140 MMOL/L (ref 133–144)
WBC # BLD AUTO: 3.6 10E9/L (ref 4–11)

## 2021-02-17 PROCEDURE — 96413 CHEMO IV INFUSION 1 HR: CPT | Performed by: INTERNAL MEDICINE

## 2021-02-17 PROCEDURE — 80053 COMPREHEN METABOLIC PANEL: CPT | Performed by: INTERNAL MEDICINE

## 2021-02-17 PROCEDURE — 99207 PR NO CHARGE LOS: CPT

## 2021-02-17 PROCEDURE — 96367 TX/PROPH/DG ADDL SEQ IV INF: CPT | Performed by: INTERNAL MEDICINE

## 2021-02-17 PROCEDURE — S0028 INJECTION, FAMOTIDINE, 20 MG: HCPCS | Performed by: INTERNAL MEDICINE

## 2021-02-17 PROCEDURE — 83735 ASSAY OF MAGNESIUM: CPT | Performed by: INTERNAL MEDICINE

## 2021-02-17 PROCEDURE — 96372 THER/PROPH/DIAG INJ SC/IM: CPT | Mod: 59 | Performed by: INTERNAL MEDICINE

## 2021-02-17 PROCEDURE — 84100 ASSAY OF PHOSPHORUS: CPT | Performed by: INTERNAL MEDICINE

## 2021-02-17 PROCEDURE — 96375 TX/PRO/DX INJ NEW DRUG ADDON: CPT | Performed by: INTERNAL MEDICINE

## 2021-02-17 PROCEDURE — 85025 COMPLETE CBC W/AUTO DIFF WBC: CPT | Performed by: INTERNAL MEDICINE

## 2021-02-17 RX ORDER — HEPARIN SODIUM (PORCINE) LOCK FLUSH IV SOLN 100 UNIT/ML 100 UNIT/ML
5 SOLUTION INTRAVENOUS ONCE
Status: COMPLETED | OUTPATIENT
Start: 2021-02-17 | End: 2021-02-17

## 2021-02-17 RX ORDER — ACETAMINOPHEN 325 MG/1
650 TABLET ORAL ONCE
Status: COMPLETED | OUTPATIENT
Start: 2021-02-17 | End: 2021-02-17

## 2021-02-17 RX ORDER — DIPHENHYDRAMINE HCL 25 MG
50 CAPSULE ORAL ONCE
Status: COMPLETED | OUTPATIENT
Start: 2021-02-17 | End: 2021-02-17

## 2021-02-17 RX ORDER — HEPARIN SODIUM (PORCINE) LOCK FLUSH IV SOLN 100 UNIT/ML 100 UNIT/ML
5 SOLUTION INTRAVENOUS
Status: DISCONTINUED | OUTPATIENT
Start: 2021-02-17 | End: 2021-02-17 | Stop reason: HOSPADM

## 2021-02-17 RX ADMIN — Medication 50 MG: at 12:05

## 2021-02-17 RX ADMIN — Medication 500 ML: at 12:10

## 2021-02-17 RX ADMIN — ACETAMINOPHEN 650 MG: 325 TABLET ORAL at 12:06

## 2021-02-17 RX ADMIN — HEPARIN SODIUM (PORCINE) LOCK FLUSH IV SOLN 100 UNIT/ML 5 ML: 100 SOLUTION at 11:24

## 2021-02-17 RX ADMIN — HEPARIN SODIUM (PORCINE) LOCK FLUSH IV SOLN 100 UNIT/ML 5 ML: 100 SOLUTION at 14:44

## 2021-02-17 ASSESSMENT — PAIN SCALES - GENERAL: PAINLEVEL: MODERATE PAIN (5)

## 2021-02-17 NOTE — PROGRESS NOTES
Port was accessed, labs drawn and heparin flushed per protocol.  Port was left accessed for infusion.     Bobbi Contreras RN OCN

## 2021-02-17 NOTE — PROGRESS NOTES
"    Assessment & Plan     Anxiety  Refilled the required medication since they seem to be working well for her with no new concerns.  Encouraged ongoing counseling as needed.  She declines referral today.  - busPIRone (BUSPAR) 15 MG tablet; Take 1 tablet (15 mg) by mouth 2 times daily  - venlafaxine (EFFEXOR-XR) 75 MG 24 hr capsule; Take 3 capsules (225 mg) by mouth every morning    Drug-induced polyneuropathy (H)  Multiple sclerosis (H)  Pulmonary embolism without acute cor pulmonale, unspecified chronicity, unspecified pulmonary embolism type (H) - historical  Historically noted no new concerns she states she is doing well.    Major depressive disorder, recurrent episode, moderate (H)  - venlafaxine (EFFEXOR-XR) 75 MG 24 hr capsule; Take 3 capsules (225 mg) by mouth every morning    Migraine without aura and without status migrainosus, not intractable  - propranolol ER (INDERAL LA) 80 MG 24 hr capsule; Take 1 capsule (80 mg) by mouth every morning    History of breast cancer  Metastatic breast cancer (H)  Doing well all things considered she continues to see her oncology professionals on a regular basis.    Screening for HIV (human immunodeficiency virus)  Need for prophylactic vaccination and inoculation against influenza  Screening for malignant neoplasm of cervix  Advised full female physical with her primary care provider of her choosing in the near future.           BMI:   Estimated body mass index is 25.46 kg/m  as calculated from the following:    Height as of this encounter: 1.565 m (5' 1.63\").    Weight as of this encounter: 62.4 kg (137 lb 8 oz).       Regular exercise  Return in about 6 months (around 8/18/2021) for Mental Health, Medication Recheck.    Spenser Thomas PA-C  [facility]    Ajit Munroe is a 60 year old who presents for the following health issues.    History of Present Illness       Mental Health Follow-up:  Patient presents to follow-up on Depression & " "Anxiety.Patient's depression since last visit has been:  Good  The patient is not having other symptoms associated with depression.  Patient's anxiety since last visit has been:  Good  The patient is not having other symptoms associated with anxiety.  Any significant life events: health concerns  Patient is not feeling anxious or having panic attacks.  Patient has no concerns about alcohol or drug use.     Social History  Tobacco Use    Smoking status: Former Smoker      Types: Cigarettes      Quit date: 12/1/2005      Years since quitting: 15.2    Smokeless tobacco: Never Used  Alcohol use: Not Currently    Alcohol/week: 2.0 standard drinks    Types: 1 Glasses of wine, 1 Cans of beer per week  Drug use: No      Today's PHQ-9         PHQ-9 Total Score:     (P) 2   PHQ-9 Q9 Thoughts of better off dead/self-harm past 2 weeks :   (P) Not at all   Thoughts of suicide or self harm:      Self-harm Plan:        Self-harm Action:          Safety concerns for self or others:           Migraines:   Since the patient's last clinic visit, headaches are: no change  The patient is getting headaches:  1-2 week  She is not able to do normal daily activities when she has a migraine.  The patient is taking the following rescue/relief medications:  Tylenol   Patient states \"I get total relief\" from the rescue/relief medications.   The patient is taking the following medications to prevent migraines:  Other  In the past 4 weeks, the patient has gone to an Urgent Care or Emergency Room 0 times times due to headaches.    She eats 2-3 servings of fruits and vegetables daily.She consumes 0 sweetened beverage(s) daily.She exercises with enough effort to increase her heart rate 20 to 29 minutes per day.  She exercises with enough effort to increase her heart rate 3 or less days per week.   She is taking medications regularly.       Answers for HPI/ROS submitted by the patient on 2/18/2021   Chronic problems general questions HPI Form  If you " "checked off any problems, how difficult have these problems made it for you to do your work, take care of things at home, or get along with other people?: Not difficult at all  PHQ9 TOTAL SCORE: 2  BING 7 TOTAL SCORE: 1    Review of Systems   Constitutional, HEENT, cardiovascular, pulmonary, GI, , musculoskeletal, neuro, skin, endocrine and psych systems are negative, except as otherwise noted.      Objective    /84   Pulse 74   Temp 98.6  F (37  C) (Temporal)   Resp 16   Ht 1.565 m (5' 1.63\")   Wt 62.4 kg (137 lb 8 oz)   SpO2 100%   BMI 25.46 kg/m    Body mass index is 25.46 kg/m .  Physical Exam   GENERAL: healthy, alert and no distress  NECK: no adenopathy, no asymmetry, masses, or scars and thyroid normal to palpation  RESP: lungs clear to auscultation - no rales, rhonchi or wheezes  CV: regular rate and rhythm, normal S1 S2, no S3 or S4, no murmur, click or rub, no peripheral edema and peripheral pulses strong  ABDOMEN: soft, nontender, no hepatosplenomegaly, no masses and bowel sounds normal  MS: no gross musculoskeletal defects noted, no edema    "

## 2021-02-18 ENCOUNTER — OFFICE VISIT (OUTPATIENT)
Dept: FAMILY MEDICINE | Facility: OTHER | Age: 61
End: 2021-02-18
Payer: COMMERCIAL

## 2021-02-18 VITALS
OXYGEN SATURATION: 100 % | WEIGHT: 137.5 LBS | HEIGHT: 62 IN | DIASTOLIC BLOOD PRESSURE: 84 MMHG | RESPIRATION RATE: 16 BRPM | BODY MASS INDEX: 25.3 KG/M2 | TEMPERATURE: 98.6 F | SYSTOLIC BLOOD PRESSURE: 118 MMHG | HEART RATE: 74 BPM

## 2021-02-18 DIAGNOSIS — G35 MULTIPLE SCLEROSIS (H): ICD-10-CM

## 2021-02-18 DIAGNOSIS — C50.919 METASTATIC BREAST CANCER: ICD-10-CM

## 2021-02-18 DIAGNOSIS — Z85.3 HISTORY OF BREAST CANCER: ICD-10-CM

## 2021-02-18 DIAGNOSIS — G43.009 MIGRAINE WITHOUT AURA AND WITHOUT STATUS MIGRAINOSUS, NOT INTRACTABLE: Primary | ICD-10-CM

## 2021-02-18 DIAGNOSIS — F41.9 ANXIETY: ICD-10-CM

## 2021-02-18 DIAGNOSIS — Z11.4 SCREENING FOR HIV (HUMAN IMMUNODEFICIENCY VIRUS): ICD-10-CM

## 2021-02-18 DIAGNOSIS — Z23 NEED FOR PROPHYLACTIC VACCINATION AND INOCULATION AGAINST INFLUENZA: ICD-10-CM

## 2021-02-18 DIAGNOSIS — Z12.4 SCREENING FOR MALIGNANT NEOPLASM OF CERVIX: ICD-10-CM

## 2021-02-18 DIAGNOSIS — I26.99 PULMONARY EMBOLISM WITHOUT ACUTE COR PULMONALE, UNSPECIFIED CHRONICITY, UNSPECIFIED PULMONARY EMBOLISM TYPE (H): ICD-10-CM

## 2021-02-18 DIAGNOSIS — F33.1 MAJOR DEPRESSIVE DISORDER, RECURRENT EPISODE, MODERATE (H): ICD-10-CM

## 2021-02-18 DIAGNOSIS — G62.0 DRUG-INDUCED POLYNEUROPATHY (H): ICD-10-CM

## 2021-02-18 PROCEDURE — 99214 OFFICE O/P EST MOD 30 MIN: CPT | Performed by: PHYSICIAN ASSISTANT

## 2021-02-18 RX ORDER — MORPHINE SULFATE 15 MG/1
15-30 TABLET ORAL
COMMUNITY
Start: 2020-11-30 | End: 2021-04-12

## 2021-02-18 RX ORDER — SODIUM,POTASSIUM PHOSPHATES 280-250MG
1 POWDER IN PACKET (EA) ORAL 4 TIMES DAILY
COMMUNITY
Start: 2020-12-09 | End: 2021-04-14

## 2021-02-18 RX ORDER — PROMETHAZINE HYDROCHLORIDE 25 MG/1
25 TABLET ORAL EVERY 6 HOURS PRN
COMMUNITY
Start: 2021-02-03 | End: 2022-04-14

## 2021-02-18 RX ORDER — PROPRANOLOL HYDROCHLORIDE 80 MG/1
80 CAPSULE, EXTENDED RELEASE ORAL EVERY MORNING
Qty: 90 CAPSULE | Refills: 1 | Status: SHIPPED | OUTPATIENT
Start: 2021-02-18 | End: 2021-08-16

## 2021-02-18 RX ORDER — BUSPIRONE HYDROCHLORIDE 15 MG/1
15 TABLET ORAL 2 TIMES DAILY
Qty: 180 TABLET | Refills: 1 | Status: SHIPPED | OUTPATIENT
Start: 2021-02-18 | End: 2021-08-16

## 2021-02-18 RX ORDER — VENLAFAXINE HYDROCHLORIDE 75 MG/1
225 CAPSULE, EXTENDED RELEASE ORAL EVERY MORNING
Qty: 270 CAPSULE | Refills: 1 | Status: SHIPPED | OUTPATIENT
Start: 2021-02-18 | End: 2021-08-18

## 2021-02-18 ASSESSMENT — ANXIETY QUESTIONNAIRES
7. FEELING AFRAID AS IF SOMETHING AWFUL MIGHT HAPPEN: NOT AT ALL
GAD7 TOTAL SCORE: 1
GAD7 TOTAL SCORE: 1
1. FEELING NERVOUS, ANXIOUS, OR ON EDGE: NOT AT ALL
5. BEING SO RESTLESS THAT IT IS HARD TO SIT STILL: NOT AT ALL
4. TROUBLE RELAXING: SEVERAL DAYS
7. FEELING AFRAID AS IF SOMETHING AWFUL MIGHT HAPPEN: NOT AT ALL
3. WORRYING TOO MUCH ABOUT DIFFERENT THINGS: NOT AT ALL
2. NOT BEING ABLE TO STOP OR CONTROL WORRYING: NOT AT ALL
GAD7 TOTAL SCORE: 1
6. BECOMING EASILY ANNOYED OR IRRITABLE: NOT AT ALL

## 2021-02-18 ASSESSMENT — MIFFLIN-ST. JEOR: SCORE: 1151.01

## 2021-02-18 ASSESSMENT — PATIENT HEALTH QUESTIONNAIRE - PHQ9
SUM OF ALL RESPONSES TO PHQ QUESTIONS 1-9: 2
SUM OF ALL RESPONSES TO PHQ QUESTIONS 1-9: 2
10. IF YOU CHECKED OFF ANY PROBLEMS, HOW DIFFICULT HAVE THESE PROBLEMS MADE IT FOR YOU TO DO YOUR WORK, TAKE CARE OF THINGS AT HOME, OR GET ALONG WITH OTHER PEOPLE: NOT DIFFICULT AT ALL

## 2021-02-18 ASSESSMENT — PAIN SCALES - GENERAL: PAINLEVEL: NO PAIN (0)

## 2021-02-19 ASSESSMENT — PATIENT HEALTH QUESTIONNAIRE - PHQ9: SUM OF ALL RESPONSES TO PHQ QUESTIONS 1-9: 2

## 2021-02-19 ASSESSMENT — ANXIETY QUESTIONNAIRES: GAD7 TOTAL SCORE: 1

## 2021-02-22 DIAGNOSIS — Z11.59 ENCOUNTER FOR SCREENING FOR OTHER VIRAL DISEASES: ICD-10-CM

## 2021-02-22 LAB
SARS-COV-2 RNA RESP QL NAA+PROBE: NORMAL
SPECIMEN SOURCE: NORMAL

## 2021-02-22 PROCEDURE — U0005 INFEC AGEN DETEC AMPLI PROBE: HCPCS | Performed by: PHYSICIAN ASSISTANT

## 2021-02-22 PROCEDURE — U0003 INFECTIOUS AGENT DETECTION BY NUCLEIC ACID (DNA OR RNA); SEVERE ACUTE RESPIRATORY SYNDROME CORONAVIRUS 2 (SARS-COV-2) (CORONAVIRUS DISEASE [COVID-19]), AMPLIFIED PROBE TECHNIQUE, MAKING USE OF HIGH THROUGHPUT TECHNOLOGIES AS DESCRIBED BY CMS-2020-01-R: HCPCS | Performed by: PHYSICIAN ASSISTANT

## 2021-02-23 ENCOUNTER — TELEPHONE (OUTPATIENT)
Dept: INTERVENTIONAL RADIOLOGY/VASCULAR | Facility: CLINIC | Age: 61
End: 2021-02-23

## 2021-02-25 ENCOUNTER — OFFICE VISIT (OUTPATIENT)
Dept: OPHTHALMOLOGY | Facility: CLINIC | Age: 61
End: 2021-02-25
Attending: OPHTHALMOLOGY
Payer: COMMERCIAL

## 2021-02-25 ENCOUNTER — APPOINTMENT (OUTPATIENT)
Dept: INTERVENTIONAL RADIOLOGY/VASCULAR | Facility: CLINIC | Age: 61
End: 2021-02-25
Attending: PHYSICIAN ASSISTANT
Payer: COMMERCIAL

## 2021-02-25 ENCOUNTER — TRANSFERRED RECORDS (OUTPATIENT)
Dept: HEALTH INFORMATION MANAGEMENT | Facility: CLINIC | Age: 61
End: 2021-02-25

## 2021-02-25 ENCOUNTER — APPOINTMENT (OUTPATIENT)
Dept: MEDSURG UNIT | Facility: CLINIC | Age: 61
End: 2021-02-25
Attending: OPHTHALMOLOGY
Payer: COMMERCIAL

## 2021-02-25 ENCOUNTER — HOSPITAL ENCOUNTER (OUTPATIENT)
Facility: CLINIC | Age: 61
Discharge: HOME | End: 2021-02-25
Attending: OPHTHALMOLOGY | Admitting: INTERNAL MEDICINE
Payer: COMMERCIAL

## 2021-02-25 VITALS
HEART RATE: 88 BPM | RESPIRATION RATE: 18 BRPM | SYSTOLIC BLOOD PRESSURE: 132 MMHG | DIASTOLIC BLOOD PRESSURE: 70 MMHG | TEMPERATURE: 98 F | OXYGEN SATURATION: 99 %

## 2021-02-25 DIAGNOSIS — Z96.1 PSEUDOPHAKIA, LEFT EYE: Primary | ICD-10-CM

## 2021-02-25 DIAGNOSIS — Z98.890 POSTOPERATIVE EYE STATE: ICD-10-CM

## 2021-02-25 DIAGNOSIS — C50.919 METASTATIC BREAST CANCER: ICD-10-CM

## 2021-02-25 DIAGNOSIS — C79.51 BONE METASTASES: ICD-10-CM

## 2021-02-25 LAB — B-HCG FREE SERPL-ACNC: 1.1 [IU]/L (ref 0.86–1.14)

## 2021-02-25 PROCEDURE — 20225 BONE BIOPSY TROCAR/NDL DEEP: CPT | Mod: GC | Performed by: RADIOLOGY

## 2021-02-25 PROCEDURE — 999N001020 HC STATISTIC H-SEND OUTS PREP: Performed by: INTERNAL MEDICINE

## 2021-02-25 PROCEDURE — 88360 TUMOR IMMUNOHISTOCHEM/MANUAL: CPT | Mod: 26 | Performed by: PATHOLOGY

## 2021-02-25 PROCEDURE — 96374 THER/PROPH/DIAG INJ IV PUSH: CPT | Mod: 59

## 2021-02-25 PROCEDURE — 999N001117 HC STATISTIC FOUNDATION ONE GENE PANEL: Performed by: INTERNAL MEDICINE

## 2021-02-25 PROCEDURE — 88377 M/PHMTRC ALYS ISHQUANT/SEMIQ: CPT | Mod: 26 | Performed by: MEDICAL GENETICS

## 2021-02-25 PROCEDURE — G0463 HOSPITAL OUTPT CLINIC VISIT: HCPCS | Mod: 25

## 2021-02-25 PROCEDURE — 88342 IMHCHEM/IMCYTCHM 1ST ANTB: CPT | Mod: TC | Performed by: INTERNAL MEDICINE

## 2021-02-25 PROCEDURE — 77012 CT SCAN FOR NEEDLE BIOPSY: CPT

## 2021-02-25 PROCEDURE — 272N000509 HC NEEDLE CR9

## 2021-02-25 PROCEDURE — 250N000011 HC RX IP 250 OP 636: Performed by: STUDENT IN AN ORGANIZED HEALTH CARE EDUCATION/TRAINING PROGRAM

## 2021-02-25 PROCEDURE — 99153 MOD SED SAME PHYS/QHP EA: CPT

## 2021-02-25 PROCEDURE — 99024 POSTOP FOLLOW-UP VISIT: CPT | Mod: GC | Performed by: OPHTHALMOLOGY

## 2021-02-25 PROCEDURE — 88307 TISSUE EXAM BY PATHOLOGIST: CPT | Mod: TC | Performed by: INTERNAL MEDICINE

## 2021-02-25 PROCEDURE — 99152 MOD SED SAME PHYS/QHP 5/>YRS: CPT | Mod: GC | Performed by: RADIOLOGY

## 2021-02-25 PROCEDURE — 999N000132 HC STATISTIC PP CARE STAGE 1

## 2021-02-25 PROCEDURE — 88360 TUMOR IMMUNOHISTOCHEM/MANUAL: CPT | Mod: TC | Performed by: INTERNAL MEDICINE

## 2021-02-25 PROCEDURE — 88377 M/PHMTRC ALYS ISHQUANT/SEMIQ: CPT | Mod: TC | Performed by: PATHOLOGY

## 2021-02-25 PROCEDURE — 88333 PATH CONSLTJ SURG CYTO XM 1: CPT | Mod: TC | Performed by: INTERNAL MEDICINE

## 2021-02-25 PROCEDURE — 999N001019 HC STATISTIC H-FISH PROCESS B/S: Performed by: INTERNAL MEDICINE

## 2021-02-25 PROCEDURE — 85610 PROTHROMBIN TIME: CPT

## 2021-02-25 PROCEDURE — 250N000009 HC RX 250: Performed by: STUDENT IN AN ORGANIZED HEALTH CARE EDUCATION/TRAINING PROGRAM

## 2021-02-25 PROCEDURE — 88307 TISSUE EXAM BY PATHOLOGIST: CPT | Mod: 26 | Performed by: PATHOLOGY

## 2021-02-25 PROCEDURE — 99152 MOD SED SAME PHYS/QHP 5/>YRS: CPT

## 2021-02-25 PROCEDURE — 258N000003 HC RX IP 258 OP 636: Performed by: STUDENT IN AN ORGANIZED HEALTH CARE EDUCATION/TRAINING PROGRAM

## 2021-02-25 PROCEDURE — 88342 IMHCHEM/IMCYTCHM 1ST ANTB: CPT | Mod: 26 | Performed by: PATHOLOGY

## 2021-02-25 PROCEDURE — 77012 CT SCAN FOR NEEDLE BIOPSY: CPT | Mod: 26 | Performed by: RADIOLOGY

## 2021-02-25 PROCEDURE — 272N000505 HC NEEDLE CR5

## 2021-02-25 RX ORDER — FLUMAZENIL 0.1 MG/ML
0.2 INJECTION, SOLUTION INTRAVENOUS
Status: DISCONTINUED | OUTPATIENT
Start: 2021-02-25 | End: 2021-02-25 | Stop reason: HOSPADM

## 2021-02-25 RX ORDER — SODIUM CHLORIDE 9 MG/ML
INJECTION, SOLUTION INTRAVENOUS CONTINUOUS
Status: DISCONTINUED | OUTPATIENT
Start: 2021-02-25 | End: 2021-02-25 | Stop reason: HOSPADM

## 2021-02-25 RX ORDER — NALOXONE HYDROCHLORIDE 0.4 MG/ML
0.2 INJECTION, SOLUTION INTRAMUSCULAR; INTRAVENOUS; SUBCUTANEOUS
Status: DISCONTINUED | OUTPATIENT
Start: 2021-02-25 | End: 2021-02-25 | Stop reason: HOSPADM

## 2021-02-25 RX ORDER — LIDOCAINE 40 MG/G
CREAM TOPICAL
Status: DISCONTINUED | OUTPATIENT
Start: 2021-02-25 | End: 2021-02-25 | Stop reason: HOSPADM

## 2021-02-25 RX ORDER — FENTANYL CITRATE 50 UG/ML
25-50 INJECTION, SOLUTION INTRAMUSCULAR; INTRAVENOUS EVERY 5 MIN PRN
Status: DISCONTINUED | OUTPATIENT
Start: 2021-02-25 | End: 2021-02-25 | Stop reason: HOSPADM

## 2021-02-25 RX ORDER — KETOROLAC TROMETHAMINE 10 MG/1
10 TABLET, FILM COATED ORAL EVERY 4 HOURS PRN
Status: CANCELLED | OUTPATIENT
Start: 2021-02-25 | End: 2021-03-02

## 2021-02-25 RX ORDER — NALOXONE HYDROCHLORIDE 0.4 MG/ML
0.4 INJECTION, SOLUTION INTRAMUSCULAR; INTRAVENOUS; SUBCUTANEOUS
Status: DISCONTINUED | OUTPATIENT
Start: 2021-02-25 | End: 2021-02-25 | Stop reason: HOSPADM

## 2021-02-25 RX ORDER — KETOROLAC TROMETHAMINE 30 MG/ML
15 INJECTION, SOLUTION INTRAMUSCULAR; INTRAVENOUS EVERY 6 HOURS PRN
Status: DISCONTINUED | OUTPATIENT
Start: 2021-02-25 | End: 2021-02-25 | Stop reason: HOSPADM

## 2021-02-25 RX ADMIN — FENTANYL CITRATE 50 MCG: 50 INJECTION, SOLUTION INTRAMUSCULAR; INTRAVENOUS at 11:46

## 2021-02-25 RX ADMIN — FENTANYL CITRATE 25 MCG: 50 INJECTION, SOLUTION INTRAMUSCULAR; INTRAVENOUS at 11:58

## 2021-02-25 RX ADMIN — MIDAZOLAM 0.5 MG: 1 INJECTION INTRAMUSCULAR; INTRAVENOUS at 11:59

## 2021-02-25 RX ADMIN — SODIUM CHLORIDE: 9 INJECTION, SOLUTION INTRAVENOUS at 10:49

## 2021-02-25 RX ADMIN — LIDOCAINE HYDROCHLORIDE 8 ML: 10 INJECTION, SOLUTION EPIDURAL; INFILTRATION; INTRACAUDAL; PERINEURAL at 12:05

## 2021-02-25 RX ADMIN — MIDAZOLAM 1 MG: 1 INJECTION INTRAMUSCULAR; INTRAVENOUS at 11:46

## 2021-02-25 RX ADMIN — PROCHLORPERAZINE EDISYLATE 5 MG: 5 INJECTION INTRAMUSCULAR; INTRAVENOUS at 11:18

## 2021-02-25 RX ADMIN — FENTANYL CITRATE 25 MCG: 50 INJECTION, SOLUTION INTRAMUSCULAR; INTRAVENOUS at 12:04

## 2021-02-25 RX ADMIN — MIDAZOLAM 0.5 MG: 1 INJECTION INTRAMUSCULAR; INTRAVENOUS at 12:03

## 2021-02-25 ASSESSMENT — TONOMETRY
IOP_METHOD: TONOPEN
OD_IOP_MMHG: 13
OS_IOP_MMHG: 13

## 2021-02-25 ASSESSMENT — CUP TO DISC RATIO: OS_RATIO: 0.4

## 2021-02-25 ASSESSMENT — VISUAL ACUITY
OS_PH_SC: 20/80
METHOD: SNELLEN - LINEAR
OS_SC: 20/150
CORRECTION_TYPE: GLASSES
OD_CC: 20/150

## 2021-02-25 ASSESSMENT — EXTERNAL EXAM - LEFT EYE: OS_EXAM: NORMAL

## 2021-02-25 ASSESSMENT — SLIT LAMP EXAM - LIDS: COMMENTS: NORMAL

## 2021-02-25 NOTE — PRE-PROCEDURE
GENERAL PRE-PROCEDURE:   Procedure:  CT guided right acetabular biopsy.   Date/Time:  2/25/2021 10:56 AM    Verbal consent obtained?: Yes    Written consent obtained?: Yes    Risks and benefits: Risks, benefits and alternatives were discussed    Consent given by:  Patient  Patient states understanding of procedure being performed: Yes    Patient's understanding of procedure matches consent: Yes    Procedure consent matches procedure scheduled: Yes    Expected level of sedation:  Moderate  Appropriately NPO:  Yes  ASA Class:  Class 2- mild systemic disease, no acute problems, no functional limitations  Mallampati  :  Grade 2- soft palate, base of uvula, tonsillar pillars, and portion of posterior pharyngeal wall visible  Lungs:  Lungs clear with good breath sounds bilaterally  Heart:  Normal heart sounds and rate  History & Physical reviewed:  History and physical reviewed and no updates needed  Statement of review:  I have reviewed the lab findings, diagnostic data, medications, and the plan for sedation

## 2021-02-25 NOTE — NURSING NOTE
Chief Complaint(s) and History of Present Illness(es)     Post Op (Ophthalmology) Left Eye     In left eye.  Associated symptoms include Negative for eye pain, dryness, redness and tearing.  Pain was noted as 0/10.              Comments     2.5 week post op LE CE/IOL/TORIC (02/08/21). Pt notes the LE vision is doing good. Pt notes she has a really bad migraine right now that has been going on for 4 days. Pt also has surgery today at the hospital.       --pt upset today due to not told that she was going to be dilated today in her LE. I informed pt that she will be dilated at the 2-3 week rosario for her RE (surgery date 03/08/21).     Ocular meds:  Pred BID KALINA Davila Mai 8:31 AM February 25, 2021

## 2021-02-25 NOTE — PROGRESS NOTES
2A prep for Bone Biopsy- VSS. C/O Migraine HA. Requests Toradol (Dr. Tomas aware). Ice pack and dark room helping the HA. PORT accessed on right chest. Prep Complete. Consent signed.

## 2021-02-25 NOTE — PROCEDURES
[facility]    Procedure: IR Procedure Note    Date/Time: 2/25/2021 12:25 PM  Performed by: Reji Paige DO  Authorized by: Reji Paige DO   IR Fellow Physician: Royal    UNIVERSAL PROTOCOL   Site Marked: NA  Prior Images Obtained and Reviewed:  Yes  Required items: Required blood products, implants, devices and special equipment available    Patient identity confirmed:  Verbally with patient, arm band, provided demographic data and hospital-assigned identification number  Patient was reevaluated immediately before administering moderate or deep sedation or anesthesia  Confirmation Checklist:  Patient's identity using two indicators, relevant allergies, procedure was appropriate and matched the consent or emergent situation and correct equipment/implants were available  Time out: Immediately prior to the procedure a time out was called    Universal Protocol: the Joint Commission Universal Protocol was followed    Preparation: Patient was prepped and draped in usual sterile fashion           ANESTHESIA    Anesthesia: Local infiltration  Local Anesthetic:  Lidocaine 1% without epinephrine  Anesthetic Total (mL):  10      SEDATION    Patient Sedated: Yes    Sedation Type:  Moderate (conscious) sedation  Sedation:  Fentanyl and midazolam  Vital signs: Vital signs monitored during sedation    See dictated procedure note for full details.  Findings: Previously mildly FDG avid lytic lesion in the superior aspect of the right acetabulum biopsied with a total of 4 18-gauge core biopsies obtained.  No significant tissue return.    Specimens: core needle biopsy specimens sent for pathological analysis    Complications: None    Condition: Stable    Plan: Bedrest for 1 hour.  Patient can be discharged when meeting appropriate discharge criteria.    PROCEDURE   Patient Tolerance:  Patient tolerated the procedure well with no immediate complications    Length of  time physician/provider present for 1:1 monitoring during sedation: 30

## 2021-02-25 NOTE — PROGRESS NOTES
S/p bone biopsy- Right hip site intact. VSS. C/o slight pain at right hip. Denies migraine. Eating crackers and drinking water.  at bedside.

## 2021-02-25 NOTE — IP AVS SNAPSHOT
[facility] [address] [address]                                    After Visit Summary   2/25/2021    Shaneka Alvarez    MRN: [id number]           After Visit Summary Signature Page    I have received my discharge instructions, and my questions have been answered. I have discussed any challenges I see with this plan with the nurse or doctor.    ..........................................................................................................................................  Patient/Patient Representative Signature      ..........................................................................................................................................  Patient Representative Print Name and Relationship to Patient    ..................................................               ................................................  Date                                   Time    ..........................................................................................................................................  Reviewed by Signature/Title    ...................................................              ..............................................  Date                                               Time          22EPIC Rev 08/18

## 2021-02-25 NOTE — IR NOTE
Patient Name: Shaneka Alvarez  Medical Record Number: [id number]  Today's Date: 2/25/2021    Procedure: image guided right acetabulum biopsy  Proceduralist: Dr AIDEN Garrison, Dr ALLAN Paige    Sedation start time: 1146  Sedation end time: 1223  Sedation medications administered: 2 mg versed, 100 mcg fentanyl  Total sedation time: 37 min  Sedation Notes: sedation started prior to TO with MD approval due to anxiety    Procedure start time: 1159  Procedure end time: 1223      Report given to: Graciela MURRAY   : none    Other Notes: Pt arrived to IR room CT2 from . Consent reviewed, pt confirmed. Pt denies any questions or concerns regarding procedure. Pt positioned supine and monitored per protocol. Pathology present for procedure. Specimens sent as ordered. Site cleansed and dressed per protocol. Pt tolerated procedure without any noted complications. Pt transferred back to .

## 2021-02-25 NOTE — DISCHARGE INSTRUCTIONS
[facility]    Interventional Radiology  Patient Instructions Following Biopsy    AFTER YOU GO HOME  ? If you were given sedation DO NOT drive or operate machinery at home or at work for at least 24 hours  ? DO relax and take it easy for 48 hours, no strenuous activity for 24 hours  ? DO drink plenty of fluids  ? DO resume your regular diet, unless otherwise instructed by your Primary Physician  ? Keep the dressing dry and in place for 24 hours.  ? DO NOT SMOKE FOR AT LEAST 24 HOURS, if you have been given any medications that were to help you relax or sedate you during your procedure  ? DO NOT drink alcoholic beverages the day of your procedure  ? DO NOT do any strenuous exercise or lifting (> 10 lbs) for at least 7 days following your procedure  ? DO NOT take a bath or shower for at least 12 hours following your procedure  ? Remove dressing after shower the next day. Replace with Band aid for 2 days.  Never leave a wet dressing in place.  ? DO NOT make any important or legal decisions for 24 hours following your procedure  ? There should be minimum drainage from the biopsy site    CALL THE PHYSICIAN IF:  ? You start bleeding from the procedure site.  If you do start to bleed from that site, lie down flat and hold pressure on the site for a minimum of 10 minutes.  Your physician will tell you if you need to return to the hospital  ? You develop nausea or vomiting  ? You have excessive swelling, redness, or tenderness at the site  ? You have drainage that looks like it is infected.  ? You experience severe pain  ? You develop hives or a rash or unexplained itching  ? You develop shortness of breath  ? You develop a temperature of 101 degrees F or greater  ? You develop bloody clots or red urine after you are discharged  ? You develop chest pain or cough up blood, lightheadedness or fainting  .    [facility] INTERVENTIONAL RADIOLOGY DEPARTMENT  Procedure Physician:         Dr. Paige and Dr. Garrison     Date of procedure: February 25, 2021  Telephone Numbers: [phone] Monday-Friday 8:00 am to 4:30 pm  [phone] After 4:30 pm Monday-Friday, Weekends & Holidays.   Ask for the Interventional Radiologist on call.  Someone is on call 24 hrs/day  [facility] toll free number: [phone] Monday-Friday 8:00 am to 4:30 pm  [facility] Emergency Dept: [phone]

## 2021-02-25 NOTE — IP AVS SNAPSHOT
MRN:[id number]                      After Visit Summary   2/25/2021    Shaneka Alvarez    MRN: [id number]           Visit Information        Department      2/25/2021  9:48 AM [facility] [address] [place]          Review of your medicines      UNREVIEWED medicines. Ask your doctor about these medicines       Dose / Directions   acetaminophen 500 MG tablet  Commonly known as: TYLENOL  Used for: Carcinoma of breast metastatic to bone, unspecified laterality (H)      Dose: 1,000 mg  Take 2 tablets (1,000 mg) by mouth 3 times daily  Quantity: 180 tablet  Refills: 0     apixaban ANTICOAGULANT 5 MG tablet  Commonly known as: ELIQUIS  Used for: Acute pulmonary embolism without acute cor pulmonale, unspecified pulmonary embolism type (H)      Dose: 5 mg  Take 1 tablet (5 mg) by mouth 2 times daily  Quantity: 60 tablet  Refills: 3     busPIRone 15 MG tablet  Commonly known as: BUSPAR  Used for: Anxiety      Dose: 15 mg  Take 1 tablet (15 mg) by mouth 2 times daily  Quantity: 180 tablet  Refills: 1     calcium citrate-vitamin D 315-250 MG-UNIT Tabs per tablet  Commonly known as: CITRACAL      Take by mouth every morning  Refills: 0     HEMP OIL OR EXTRACT OR OTHER CBD CANNABINOID (NOT MEDICAL CANNABIS)      CBD cream  Refills: 0     loperamide 2 MG capsule  Commonly known as: IMODIUM  Used for: Chemotherapy-induced neutropenia (H), Hypokalemia, Metastatic breast cancer (H), Bilateral malignant neoplasm of breast in female, unspecified estrogen receptor status, unspecified site of breast (H)      Start with 2 caps (4 mg), then take one cap (2 mg) after each diarrheal stool as needed. Do not use more than 8 caps (16 mg) per day.  Quantity: 30 capsule  Refills: 0     LORazepam 1 MG tablet  Commonly known as: ATIVAN  Used for: Chemotherapy induced nausea and vomiting      Dose: 1 mg  Take 1 tablet (1 mg) by mouth every 4 hours as needed for anxiety or nausea  Quantity: 30 tablet  Refills: 0      magnesium 250 MG tablet      Dose: 1 tablet  Take 1 tablet by mouth every morning  Refills: 0     * morphine 15 MG IR tablet  Commonly known as: MSIR      Dose: 15-30 mg  Take 15-30 mg by mouth as needed  Refills: 0     * morphine 15 MG CR tablet  Commonly known as: MS CONTIN  Used for: Cancer associated pain      Dose: 15 mg  Take 1 tablet (15 mg) by mouth every evening  Quantity: 30 tablet  Refills: 0     OLANZapine 5 MG tablet  Commonly known as: zyPREXA      Take one tablet, starting the first day after chemotherapy for 5 days, then stop. Repeat for each chemotherapy cycle for nausea, vomiting.  Refills: 0     potassium & sodium phosphates 280-160-250 MG Packet  Commonly known as: NEUTRA-PHOS      Dose: 1 packet  Take 1 packet by mouth 4 times daily  Refills: 0     potassium chloride ER 10 MEQ CR tablet  Commonly known as: KLOR-CON M  Used for: Hypokalemia      Dose: 10 mEq  Take 1 tablet (10 mEq) by mouth 3 times daily  Quantity: 90 tablet  Refills: 0     prednisoLONE acetate 1 % ophthalmic suspension  Commonly known as: PRED FORTE  Used for: Posterior subcapsular polar age-related cataract of both eyes      Dose: 1 drop  Apply 1 drop to eye 4 times daily To operative eye  Quantity: 10 mL  Refills: 1     promethazine 25 MG tablet  Commonly known as: PHENERGAN      Dose: 25 mg  Take 25 mg by mouth every 6 hours as needed  Refills: 0     propranolol ER 80 MG 24 hr capsule  Commonly known as: INDERAL LA  Used for: Migraine without aura and without status migrainosus, not intractable      Dose: 80 mg  Take 1 capsule (80 mg) by mouth every morning  Quantity: 90 capsule  Refills: 1     senna-docusate 8.6-50 MG tablet  Commonly known as: SENOKOT-S/PERICOLACE  Used for: Carcinoma of breast metastatic to bone, unspecified laterality (H)      Dose: 1-2 tablet  Take 1-2 tablets by mouth 2 times daily as needed for constipation . Goal is to have a bowel movement every 1-2 days.  Refills: 0     traZODone 50 MG  tablet  Commonly known as: DESYREL  Used for: Insomnia, unspecified type      Dose: 50 mg  Take 1 tablet (50 mg) by mouth At Bedtime  Quantity: 30 tablet  Refills: 1     venlafaxine 75 MG 24 hr capsule  Commonly known as: EFFEXOR-XR  Used for: Anxiety, Major depressive disorder, recurrent episode, moderate (H)      Dose: 225 mg  Take 3 capsules (225 mg) by mouth every morning  Quantity: 270 capsule  Refills: 1     VITAMIN D3 PO      Dose: 2,000 Units  Take 2,000 Units by mouth every morning  Refills: 0         * This list has 2 medication(s) that are the same as other medications prescribed for you. Read the directions carefully, and ask your doctor or other care provider to review them with you.                  Protect others around you: Learn how to safely use, store and throw away your medicines at www.disposemymeds.org.       Follow-ups after your visit       Your next 10 appointments already scheduled    Mar 02, 2021  8:00 AM  Video Visit with Dewayne Zepeda MD  [facility] ([facility]) [address] [place] [address]  [phone]   Please Note: This is a virtual visit; there is no need to come to the facility.       Mar 03, 2021  9:30 AM  Lab Central with NURSE ONLY CANCER CENTER  [facility] ([facility]) [address] [place] [id number]  [phone]      Mar 03, 2021 10:00 AM  Level 2 with BAY 6 [facility] ([facility]) [address] [place] [id number]  [phone]      Mar 04, 2021 10:00 AM  Pre-procedure Covid with ER COVID LAB  [facility] ([facility] ) [address] [facility] [id number] [phone]      Mar 04, 2021 11:30 AM  (Arrive by 11:15 AM)  Video Visit with Idalia So PA-C  [facility]  Preoperative Assessment Center [place] ([facility] [place] ) [address] [place] [address]  5th Floor  [facility] [id number] [phone]   Please Note: This is a virtual visit; there is no need to come to the facility.       Mar 08, 2021  Procedure with Luis Barkley MD  [facility] OR [place] ([facility] ) [address] [facility]  5th Floor  [facility] [id number] [phone]   Located in the Clinics and Surgery Center at [address] [place] [address], [facility] [id number].   parking is very convenient and highly recommended.  is a flat rate fee.  Both  and self parkers should enter the main arrival plaza from [facility]; parking attendants will direct you based on your parking preference. Visit [email or web address] for more details.   Mar 10, 2021 11:00 AM  Lab Central with NURSE ONLY CANCER CENTER  [facility] ([facility]) [address] [place] [id number] [phone]      Mar 10, 2021 11:30 AM  Level 2 with BAY 9 INFUSION  [facility] ([facility]) [address] [place] [id number] [phone]         Care Instructions       Further instructions from your care team       [facility]    Interventional Radiology  Patient Instructions Following Biopsy    AFTER YOU GO HOME  ? If you were given sedation DO NOT drive or operate machinery at home or at work for at least 24 hours  ? DO relax and take it easy for 48 hours, no strenuous activity for 24 hours  ? DO drink plenty of fluids  ? DO resume your regular diet, unless otherwise instructed by your Primary Physician  ? Keep the dressing dry and in place for 24 hours.  ? DO NOT SMOKE FOR AT LEAST 24 HOURS, if you have been given any medications that were to help you relax or sedate you during your procedure  ? DO NOT  drink alcoholic beverages the day of your procedure  ? DO NOT do any strenuous exercise or lifting (> 10 lbs) for at least 7 days following your procedure  ? DO NOT take a bath or shower for at least 12 hours following your procedure  ? Remove dressing after shower the next day. Replace with Band aid for 2 days.  Never leave a wet dressing in place.  ? DO NOT make any important or legal decisions for 24 hours following your procedure  ? There should be minimum drainage from the biopsy site    CALL THE PHYSICIAN IF:  ? You start bleeding from the procedure site.  If you do start to bleed from that site, lie down flat and hold pressure on the site for a minimum of 10 minutes.  Your physician will tell you if you need to return to the hospital  ? You develop nausea or vomiting  ? You have excessive swelling, redness, or tenderness at the site  ? You have drainage that looks like it is infected.  ? You experience severe pain  ? You develop hives or a rash or unexplained itching  ? You develop shortness of breath  ? You develop a temperature of 101 degrees F or greater  ? You develop bloody clots or red urine after you are discharged  ? You develop chest pain or cough up blood, lightheadedness or fainting  .    [facility] INTERVENTIONAL RADIOLOGY DEPARTMENT  Procedure Physician:         Dr. Paige and Dr. Garrison    Date of procedure: February 25, 2021  Telephone Numbers: [phone] Monday-Friday 8:00 am to 4:30 pm  [phone] After 4:30 pm Monday-Friday, Weekends & Holidays.   Ask for the Interventional Radiologist on call.  Someone is on call 24 hrs/day  [facility] toll free number: [phone] Monday-Friday 8:00 am to 4:30 pm  [facility] Emergency Dept: [phone]          Additional Information About Your Visit       [email or web address] Information    [employer] gives you secure access to your electronic health record. If you see a primary care provider, you can also send messages to your care team and make appointments. If you have  questions, please call your primary care clinic.  If you do not have a primary care provider, please call [phone] and they will assist you.       Care EveryWhere ID    This is your Care EveryWhere ID. This could be used by other organizations to access your [facility] medical records  [id number]       Your Vitals Were  Most recent update: 2/25/2021  1:09 PM    Blood Pressure   126/89    Pulse   90    Temperature   98  F (36.7  C)    Respirations   19    Pulse Oximetry   100%          Primary Care Provider Office Phone # Fax #    [facility] [phone]      Equal Access to Services    [facility]: Hadii aad ku hadasho Soomaali, waaxda luqadaha, qaybta kaalmada adeegyada, waxbing lópez hayaan adeeg anurag vora . So [facility] [phone].    ATENCIÓN: Si habla español, tiene a jacobs disposición servicios gratuitos de asistencia lingüística. Llame al [phone].    We comply with applicable federal and state civil rights laws, including the [place] Human Rights Act. We do not discriminate on the basis of race, color, creed, Sikhism, national origin, marital status, age, disability, sex, sexual orientation, or gender identity.    If you would like an itemization of your charges they will now be available in [employer] 30 days after discharge. To access the itemized statements in [employer] go to billing/billing summary. From there select view account. There will be multiple tabs showing an overview of your account, detail, payments, and communications. From the communications tab you can see your monthly statements, your itemized statements, and any billing letters generated for your account. If you do not have a [employer] account and need help getting access please contact [employer] support at [phone].  If you would prefer to have your itemized statements mailed please contact our automated itemized bill request line at [phone] option  2.       Thank you!    Thank you for choosing  [facility] for your care. Our goal is always to provide you with excellent care. Hearing back from our patients is one way we can continue to improve our services. Please take a few minutes to complete the written survey that you may receive in the mail after you visit with us. Thank you!            Medication List      ASK your doctor about these medications          Morning Afternoon Evening Bedtime As Needed    acetaminophen 500 MG tablet  Also known as: TYLENOL  INSTRUCTIONS: Take 2 tablets (1,000 mg) by mouth 3 times daily                     apixaban ANTICOAGULANT 5 MG tablet  Also known as: ELIQUIS  INSTRUCTIONS: Take 1 tablet (5 mg) by mouth 2 times daily                     busPIRone 15 MG tablet  Also known as: BUSPAR  INSTRUCTIONS: Take 1 tablet (15 mg) by mouth 2 times daily                     calcium citrate-vitamin D 315-250 MG-UNIT Tabs per tablet  Also known as: CITRACAL  INSTRUCTIONS: Take by mouth every morning                     HEMP OIL OR EXTRACT OR OTHER CBD CANNABINOID (NOT MEDICAL CANNABIS)  INSTRUCTIONS: CBD cream                     loperamide 2 MG capsule  Also known as: IMODIUM  INSTRUCTIONS: Start with 2 caps (4 mg), then take one cap (2 mg) after each diarrheal stool as needed. Do not use more than 8 caps (16 mg) per day.                     LORazepam 1 MG tablet  Also known as: ATIVAN  INSTRUCTIONS: Take 1 tablet (1 mg) by mouth every 4 hours as needed for anxiety or nausea                     magnesium 250 MG tablet  INSTRUCTIONS: Take 1 tablet by mouth every morning                     * morphine 15 MG IR tablet  Also known as: MSIR  INSTRUCTIONS: Take 15-30 mg by mouth as needed                     * morphine 15 MG CR tablet  Also known as: MS CONTIN  INSTRUCTIONS: Take 1 tablet (15 mg) by mouth every evening                     OLANZapine 5 MG tablet  Also known as: zyPREXA  INSTRUCTIONS: Take one tablet, starting the first day after chemotherapy for 5 days, then stop. Repeat for  each chemotherapy cycle for nausea, vomiting.                     potassium & sodium phosphates 280-160-250 MG Packet  Also known as: NEUTRA-PHOS  INSTRUCTIONS: Take 1 packet by mouth 4 times daily                     potassium chloride ER 10 MEQ CR tablet  Also known as: KLOR-CON M  INSTRUCTIONS: Take 1 tablet (10 mEq) by mouth 3 times daily                     prednisoLONE acetate 1 % ophthalmic suspension  Also known as: PRED FORTE  INSTRUCTIONS: Apply 1 drop to eye 4 times daily To operative eye                     promethazine 25 MG tablet  Also known as: PHENERGAN  INSTRUCTIONS: Take 25 mg by mouth every 6 hours as needed                     propranolol ER 80 MG 24 hr capsule  Also known as: INDERAL LA  INSTRUCTIONS: Take 1 capsule (80 mg) by mouth every morning                     senna-docusate 8.6-50 MG tablet  Also known as: SENOKOT-S/PERICOLACE  INSTRUCTIONS: Take 1-2 tablets by mouth 2 times daily as needed for constipation . Goal is to have a bowel movement every 1-2 days.                     traZODone 50 MG tablet  Also known as: DESYREL  INSTRUCTIONS: Take 1 tablet (50 mg) by mouth At Bedtime                     venlafaxine 75 MG 24 hr capsule  Also known as: EFFEXOR-XR  INSTRUCTIONS: Take 3 capsules (225 mg) by mouth every morning                     VITAMIN D3 PO  INSTRUCTIONS: Take 2,000 Units by mouth every morning                        * This list has 2 medication(s) that are the same as other medications prescribed for you. Read the directions carefully, and ask your doctor or other care provider to review them with you.

## 2021-02-25 NOTE — PROGRESS NOTES
Chief Complaint(s) and History of Present Illness(es)     Post Op (Ophthalmology) Left Eye     Laterality: left eye    Associated symptoms: Negative for eye pain, dryness, redness and tearing    Pain scale: 0/10              Comments     2.5 week post op LE CE/IOL/TORIC (02/08/21). Pt notes the LE vision is   doing good. Pt notes she has a really bad migraine right now that has been   going on for 4 days. Pt also has surgery today at the hospital.       --pt upset today due to not told that she was going to be dilated today in   her LE. I informed pt that she will be dilated at the 2-3 week rosario for   her RE (surgery date 03/08/21).     Ocular meds:  Pred BID LE    Candice Blue, COMT 8:31 AM February 25, 2021         Patient doing well. Vision stable since surgery. No eye pain or discomfort. No flashes/floaters. Patient has had a migraine for the last four days and is very sensitive to lights at this time. Experiences nausea with light exposure.    Currently on PF BID at this time, tapering as directed.     Review of systems for the eyes was negative other than the pertinent positives/negatives listed in the HPI.      Assessment & Plan      Shaneka Alvarez is a 60 year old female with the following diagnoses:   1. Pseudophakia, left eye    2. Postoperative eye state      Exam limited secondary to patient difficulty tolerating slit lamp light given migraine and nausea. However, minimal inflammation, no major posterior changes noted.  Unable to get refraction today    Doing well  Ok to resume normal activities  Taper Predforte as directed  Artificial tears as needed     CEIOL toric implant right eye anticipated 3/8/21    Cherelle Ashton MD  Ophthalmology Resident, PGY-2    Attending Physician Attestation:  Complete documentation of historical and exam elements from today's encounter can be found in the full encounter summary report (not reduplicated in this progress note).  I personally obtained the chief complaint(s)  and history of present illness.  I confirmed and edited as necessary the review of systems, past medical/surgical history, family history, social history, and examination findings as documented by others; and I examined the patient myself.  I personally reviewed the relevant tests, images, and reports as documented above.  I formulated and edited as necessary the assessment and plan and discussed the findings and management plan with the patient and family. . - Luis Barkley MD

## 2021-03-02 ENCOUNTER — VIRTUAL VISIT (OUTPATIENT)
Dept: ONCOLOGY | Facility: CLINIC | Age: 61
End: 2021-03-02
Payer: COMMERCIAL

## 2021-03-02 ENCOUNTER — PATIENT OUTREACH (OUTPATIENT)
Dept: ONCOLOGY | Facility: CLINIC | Age: 61
End: 2021-03-02

## 2021-03-02 DIAGNOSIS — G47.00 INSOMNIA, UNSPECIFIED TYPE: ICD-10-CM

## 2021-03-02 DIAGNOSIS — D70.1 CHEMOTHERAPY-INDUCED NEUTROPENIA (H): Primary | ICD-10-CM

## 2021-03-02 DIAGNOSIS — T45.1X5A CHEMOTHERAPY-INDUCED NEUTROPENIA (H): Primary | ICD-10-CM

## 2021-03-02 DIAGNOSIS — C50.919 METASTATIC BREAST CANCER: ICD-10-CM

## 2021-03-02 DIAGNOSIS — C50.912 BILATERAL MALIGNANT NEOPLASM OF BREAST IN FEMALE, UNSPECIFIED ESTROGEN RECEPTOR STATUS, UNSPECIFIED SITE OF BREAST (H): ICD-10-CM

## 2021-03-02 DIAGNOSIS — C50.911 BILATERAL MALIGNANT NEOPLASM OF BREAST IN FEMALE, UNSPECIFIED ESTROGEN RECEPTOR STATUS, UNSPECIFIED SITE OF BREAST (H): ICD-10-CM

## 2021-03-02 DIAGNOSIS — E87.6 HYPOKALEMIA: ICD-10-CM

## 2021-03-02 LAB — COPATH REPORT: NORMAL

## 2021-03-02 PROCEDURE — 99214 OFFICE O/P EST MOD 30 MIN: CPT | Mod: GT | Performed by: INTERNAL MEDICINE

## 2021-03-02 RX ORDER — HEPARIN SODIUM (PORCINE) LOCK FLUSH IV SOLN 100 UNIT/ML 100 UNIT/ML
5 SOLUTION INTRAVENOUS
Status: CANCELLED | OUTPATIENT
Start: 2021-03-10

## 2021-03-02 RX ORDER — LORAZEPAM 2 MG/ML
0.5 INJECTION INTRAMUSCULAR EVERY 4 HOURS PRN
Status: CANCELLED
Start: 2021-03-03

## 2021-03-02 RX ORDER — EPINEPHRINE 1 MG/ML
0.3 INJECTION, SOLUTION INTRAMUSCULAR; SUBCUTANEOUS EVERY 5 MIN PRN
Status: CANCELLED | OUTPATIENT
Start: 2021-03-10

## 2021-03-02 RX ORDER — SODIUM CHLORIDE 9 MG/ML
1000 INJECTION, SOLUTION INTRAVENOUS CONTINUOUS PRN
Status: CANCELLED
Start: 2021-03-03

## 2021-03-02 RX ORDER — ATROPINE SULFATE 0.4 MG/ML
0.4 AMPUL (ML) INJECTION
Status: CANCELLED | OUTPATIENT
Start: 2021-03-03

## 2021-03-02 RX ORDER — ALBUTEROL SULFATE 90 UG/1
1-2 AEROSOL, METERED RESPIRATORY (INHALATION)
Status: CANCELLED
Start: 2021-03-03

## 2021-03-02 RX ORDER — EPINEPHRINE 1 MG/ML
0.3 INJECTION, SOLUTION INTRAMUSCULAR; SUBCUTANEOUS EVERY 5 MIN PRN
Status: CANCELLED | OUTPATIENT
Start: 2021-03-03

## 2021-03-02 RX ORDER — DIPHENHYDRAMINE HCL 25 MG
50 CAPSULE ORAL ONCE
Status: CANCELLED | OUTPATIENT
Start: 2021-03-03

## 2021-03-02 RX ORDER — ATROPINE SULFATE 0.4 MG/ML
0.4 AMPUL (ML) INJECTION
Status: CANCELLED | OUTPATIENT
Start: 2021-03-10

## 2021-03-02 RX ORDER — ALBUTEROL SULFATE 90 UG/1
1-2 AEROSOL, METERED RESPIRATORY (INHALATION)
Status: CANCELLED
Start: 2021-03-10

## 2021-03-02 RX ORDER — NALOXONE HYDROCHLORIDE 0.4 MG/ML
.1-.4 INJECTION, SOLUTION INTRAMUSCULAR; INTRAVENOUS; SUBCUTANEOUS
Status: CANCELLED | OUTPATIENT
Start: 2021-03-03

## 2021-03-02 RX ORDER — NALOXONE HYDROCHLORIDE 0.4 MG/ML
.1-.4 INJECTION, SOLUTION INTRAMUSCULAR; INTRAVENOUS; SUBCUTANEOUS
Status: CANCELLED | OUTPATIENT
Start: 2021-03-10

## 2021-03-02 RX ORDER — ALBUTEROL SULFATE 0.83 MG/ML
2.5 SOLUTION RESPIRATORY (INHALATION)
Status: CANCELLED | OUTPATIENT
Start: 2021-03-03

## 2021-03-02 RX ORDER — HEPARIN SODIUM,PORCINE 10 UNIT/ML
5 VIAL (ML) INTRAVENOUS
Status: CANCELLED | OUTPATIENT
Start: 2021-03-03

## 2021-03-02 RX ORDER — LORAZEPAM 2 MG/ML
0.5 INJECTION INTRAMUSCULAR EVERY 4 HOURS PRN
Status: CANCELLED
Start: 2021-03-10

## 2021-03-02 RX ORDER — DIPHENHYDRAMINE HYDROCHLORIDE 50 MG/ML
50 INJECTION INTRAMUSCULAR; INTRAVENOUS
Status: CANCELLED
Start: 2021-03-03

## 2021-03-02 RX ORDER — MEPERIDINE HYDROCHLORIDE 25 MG/ML
25 INJECTION INTRAMUSCULAR; INTRAVENOUS; SUBCUTANEOUS EVERY 30 MIN PRN
Status: CANCELLED | OUTPATIENT
Start: 2021-03-03

## 2021-03-02 RX ORDER — TRAZODONE HYDROCHLORIDE 50 MG/1
50 TABLET, FILM COATED ORAL AT BEDTIME
Qty: 30 TABLET | Refills: 1 | Status: SHIPPED | OUTPATIENT
Start: 2021-03-02 | End: 2021-04-12

## 2021-03-02 RX ORDER — METHYLPREDNISOLONE SODIUM SUCCINATE 125 MG/2ML
125 INJECTION, POWDER, LYOPHILIZED, FOR SOLUTION INTRAMUSCULAR; INTRAVENOUS
Status: CANCELLED
Start: 2021-03-10

## 2021-03-02 RX ORDER — DIPHENHYDRAMINE HYDROCHLORIDE 50 MG/ML
50 INJECTION INTRAMUSCULAR; INTRAVENOUS
Status: CANCELLED
Start: 2021-03-10

## 2021-03-02 RX ORDER — SODIUM CHLORIDE 9 MG/ML
1000 INJECTION, SOLUTION INTRAVENOUS CONTINUOUS PRN
Status: CANCELLED
Start: 2021-03-10

## 2021-03-02 RX ORDER — DIPHENHYDRAMINE HCL 25 MG
50 CAPSULE ORAL ONCE
Status: CANCELLED | OUTPATIENT
Start: 2021-03-10

## 2021-03-02 RX ORDER — ACETAMINOPHEN 325 MG/1
650 TABLET ORAL ONCE
Status: CANCELLED | OUTPATIENT
Start: 2021-03-03

## 2021-03-02 RX ORDER — METHYLPREDNISOLONE SODIUM SUCCINATE 125 MG/2ML
125 INJECTION, POWDER, LYOPHILIZED, FOR SOLUTION INTRAMUSCULAR; INTRAVENOUS
Status: CANCELLED
Start: 2021-03-03

## 2021-03-02 RX ORDER — HEPARIN SODIUM,PORCINE 10 UNIT/ML
5 VIAL (ML) INTRAVENOUS
Status: CANCELLED | OUTPATIENT
Start: 2021-03-10

## 2021-03-02 RX ORDER — HEPARIN SODIUM (PORCINE) LOCK FLUSH IV SOLN 100 UNIT/ML 100 UNIT/ML
5 SOLUTION INTRAVENOUS
Status: CANCELLED | OUTPATIENT
Start: 2021-03-03

## 2021-03-02 RX ORDER — ACETAMINOPHEN 325 MG/1
650 TABLET ORAL ONCE
Status: CANCELLED | OUTPATIENT
Start: 2021-03-10

## 2021-03-02 RX ORDER — ALBUTEROL SULFATE 0.83 MG/ML
2.5 SOLUTION RESPIRATORY (INHALATION)
Status: CANCELLED | OUTPATIENT
Start: 2021-03-10

## 2021-03-02 RX ORDER — MEPERIDINE HYDROCHLORIDE 25 MG/ML
25 INJECTION INTRAMUSCULAR; INTRAVENOUS; SUBCUTANEOUS EVERY 30 MIN PRN
Status: CANCELLED | OUTPATIENT
Start: 2021-03-10

## 2021-03-02 NOTE — PROGRESS NOTES
ONCOLOGY FOLLOW UP NOTE: 3/2/2021        I took the history from reviewing the previous notes that she was following with Dr. Amaya.  I have copied and updated from prior notes.    ONCOLOGY History:    1.  January 2006:  Diagnosed with Stage IIB, T2 N1 M0 invasive lobular carcinoma of the right breast.  Final pathology showed a 4.5 x 3.8 x 2.5 cm, 01/14 lymph nodes positive.  Estrogen, progesterone receptor positive, HER-2 negative.     2.  Genetics.  BRCA1 and 2 mutations not detected. 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       THERAPY TO DATE:   1.  2006:  ECOG 2104 protocol of dose-dense Adriamycin, Cytoxan and Avastin x4 cycles followed by Taxol and Avastin x4 cycles.   2.  03/2007:  Completed 1 year Avastin.   3.  11/2006-01/2007:  Radiotherapy to the right breast of 5040 cGy.   4.  03/[id number]:  Aromasin.  Stopped after moving to the [facility].   5.  01/2016:  Right modified radical mastectomy with latissimus dorsi flap reconstruction and a left prophylactic mastectomy with latissimus dorsi flap reconstruction.     She recently had MRI of the brain as a follow-up for multiple sclerosis and there were multiple calvarial lesions noted which was suspicious for metastatic disease.  There was no evidence of new multiple sclerosis lesions.  She had a PET scan on 8/30/2019 which showed widespread bone metastatic lesions (calvarium, spine, sacrum, pelvis, ribs most prominent at C7, T3, L1 and L4), hypermetabolic 3 cm lesion in LLL, and mediastinal/hilar LN. CEA wnl at 1.9 and C27-29 elevated to 415 (28 on 8/23/16). A CT guided biopsy of the right iliac bone was obtained on 9/4/19, pathology consistent with metastatic adenocarcinoma (breast), ER/MO negative, HER-2 negative PDL 1 < 1%. A second biopsy was take of the LLL nodule via EBUS on 9/5/19 did not show any malignancy.    C/T/L spine MRI 8/3/19 to 9/2/19 with numerous enhancing lesions of the C, T and L spine, largest around T9 and L1. No evidence  of cord compression. Has a L1 compression fracture and impending L4.     Received radiation T12-L5 3000 cGy in 10 fractions from 9/6/2019 till 9/18/2019.  Neurosurgery recommended no surgical intervention but to wear Gorge brace when out of bed and HOB >30 degrees    She started palliative Xeloda 2000/1500 on 10/1/2019 but after just a few doses she noticed nausea, red eyes blurred vision, loss of appetite.  She stopped taking it after 5 doses and was seen by nurse practitioner on 10/7/2019 when she was improving.  At that point she was restarted on Xeloda at a lower dose of 1000 mg twice a day.  As she was not able to tolerate even the lower dose with extreme nausea and vomiting and feeling extremely fatigued and blurry vision, we decided on stopping Xeloda.    We decided on repeating scans and MRI brain on 10/25/2019 showed no intracranial metastatic disease.   Multiple enhancing calvarial lesions may be increased in number and are suggestive of metastatic disease. Thinner imaging on the current study may identify the smaller lesions and may be responsible for  identified more lesions.   There is a single focus of T2 hyperintense signal within the anterior right temporal lobe may represent interval demyelination. There is no evidence for active demyelination.    PET/CT on 11/1/2019 showed favorable response to therapy and Overall FDG uptake within scattered osseous metastases is decreased since 8/30/2019, particularly within the pelvis. Increased sclerotic appearance of L1 and L4 pathologic compression deformities, likely sequela of recently completed palliative radiation therapy.    No significant FDG uptake in previously demonstrated hypermetabolic mediastinal lymph nodes. Biopsy negative on 9/5/2019.  There is also decreased FDG uptake in the left lower lobe (biopsy negative for  malignancy), now with dense consolidation containing air bronchograms suggestive of infection versus aspiration.  There is diffuse  FDG uptake within the esophagus, consistent with esophagitis.    Because of intolerance to Xeloda we decided on switching to weekly paclitaxel on 11/5/2019.    C#2 Taxol 12/4/19- started with 70mg/m2 dose reduction    C#3 Taxol 12/30/2019     PET/CT on 1/24/2020 showed progression of the disease in some of the bone lesions including increase in size and lytic lesion within the vertebral body of C4 measuring 1 cm as opposed to 0.6 cm previously and a new central soft tissue nodule with SUV max 4.1 when previously it was non-hypermetabolic.  There is also some progression noted in the right proximal femur and right iliac bone.  There is decreased metabolic uptake in the right lamina of T9 with SUV max 4.7 when previously it was 8.0.  Other lesions are stable.    CA 27-29 also had increased significantly and it was 257 on 1/28/2020.    We decided on switching to eribulin on 1/28/2020.    C#2 2/18/2020  C#2 D#8 2/25/2020    C#3 D#1 3/18/2020 -delayed by 1 week as per patient's preference because she wanted to visit her family.    She had a repeat PET/CT on 3/27/2020 which showed stable size of the bone metastatic lesions although the FDG avidity was less, consistent with treatment response.    She had MRI of the thoracic spine on 4/3/2020 and it was compared to the one which was done in August 2019 and it showed increased size of several metastatic lesions most notably at T9 but also at T5-T7.  Other lesions at T10, T11 and T12 appeared improved.    CA 27-29 was also down to 222 on 3/18/2020.    Cycle #4 eribulin ( dose reduced to 1.2 mg/m2 )-4/7/2020-   Cycle #5 Eribulin ( 1.2 mg/m2 )- 4/28/2020   Cycle #6 Eribulin ( 1.2 mg/m2 )- 5/19/2020     Cycle #7 Eribulin ( 1.2 mg/m2 )- 6/9/2020    Cycle #8 Eribulin ( 1.2 mg/m2 )- 6/30/2020     She had a repeat PET scan on 7/17/2020 which showed incidental finding of new acute bilateral pulmonary embolism in the distal left main pulmonary artery extending in the left upper and  lower lobes and in the segmental and branch arteries in the right lower lobe.    It also showed mildly increased FDG avidity in the lytic lesion in the T9 lamina and right pedicle with SUV max 5.3, previously 3.9.  That is also slightly increased FDG uptake to the left anterior fifth rib at the costochondral junction SUV max 3.9, previously 2.5.  There is slightly decreased FDG uptake in the right femoral neck lesion SUV max 2.2, previously 2.9.  FDG uptake in other bone lesions is fairly stable.  No new metastasis are seen.    CA 27-29 was 308 on 6/30/2020.  It was 286 on 5/19/2020.    Overall this is consistent with only slight progression versus stable disease.    She was started on Lovenox.    Cycle #9 eribulin 7/21/2020. CA 27-29 was 238    C#10 eribulin 8/18/2020. ( delayed by one week for ANC 0.9 )    C#11 Eribulin 9/8/2020    C#12 Eribulin 9/29/2020     C#13 Eribulin 10/20/2020     PET scan on 11/6/2020 shows progression of the disease with increased FDG uptake in the lytic lesions in the T9/T10 and right posterolateral elements as well as increased FDG uptake in the previously known hypermetabolic right iliac bone lesion suggesting recurrence of previously treated metastasis.  There is new subtle lesion in the right manubrium.  There is also a new fracture in the anterolateral left fourth rib with associated uptake and this could be a pathologic fracture.  Healing fracture in the left anterior fifth rib lesion.  There is resolution of the left pulmonary emboli.  Decreased FDG uptake of the esophagus consistent with improving inflammation.    CA 27-29 on 10/20/2020 was also elevated at 489.    C#1 Trodelvy on 11/11/2020.  Cycle #2 Trodelvy 12/2/2020  Cycle number 2-day #8 Trodelvy 12/8/2020.    12/15/2020-12/16/2020.  She was admitted to the hospital with nausea vomiting and dehydration.  She had tachycardia and initial lactic acid of 5.  It decreased to 1.4 after 2 L of normal saline.  She also had  hypokalemia and ANC was 1.3.  She was treated with IV fluids.  Procalcitonin was negative.  CTA of the chest was negative for pulmonary embolism or pneumonia.  No bacterial infection was documented.  Her medication which she was initially unable to tolerate at home, were restarted and she was discharged home once started to feel better.      We delayed the start of cycle number 3 x 1 week and decrease the dose of chemotherapy by 25%.  She also had neutropenia with ANC of 1.0.      She started cycle number 3-day #1 on 12/29/2020.  CA 27-29 was 392.    Cycle number 3-day #8 1/5/2021.    C#4 Trodelvy 1/20/2021- 75% dose    2/5/2021.  PET/CT overall shows a good response to treatment with improvement of previously hypermetabolic lesions in the spine and pelvis and stability of other bone meta stasis.  There is a single lytic lesion in the right iliac bone which demonstrates slight Yimi increased FDG uptake and has SUV max 4.7 while previously it was SUV max 3.4.  There was diffuse wall thickening of the esophagus with uptake in the esophagus in the fundus of the stomach and this could be seen with inflammation.    Trodelvy cycle #5 - 2/10/2021.  75% of the dose.  CA 27-29 was 337.    Trodelvy cycle #6 - 3/3/2021.  75% of the dose.        Interval history.  This is a video visit.    On 2/25/2020 when she had biopsy of the right acetabulum and it was consistent with metastatic lobular carcinoma.  Receptor studies and HER-2/sheldon FISH is pending.   She tells me that the last chemotherapy went pretty well.  Nausea was under good control.  She had some constipation but after that she had an episode of diarrhea but after that bowel movements were fine.  Pain is under fair control and she continues to be on morphine.  Continues to take calcium and vitamin D.  She denies any fevers infections or shortness of breath.  She has noticed that tingling and numbness in the fingertips is slightly worse.  No new swellings.  Overall energy  is decent.      ECOG 1    ROS:  A comprehensive ROS was otherwise neg          I reviewed other history in epic as below.       PAST MEDICAL HISTORY:     1.  Breast cancer  2.  Multiple sclerosis.   3.  Depression.   4.  Migraines.   5.  Hypertension.   6.  Cholecystectomy and umbilical hernia repair.     SOCIAL HISTORY: She smoked for 5 years but quit many years ago.  Drinks alcohol socially.  She lives with her .  She is a teacher in middle school.       FAMILY HISTORY: She mentions that her mother had breast cancer at 49.  Both grandmothers had breast cancer but she is not sure of the age.  A couple of cousins have cancers.  Patient has 3 children who are healthy.    Current Outpatient Medications   Medication     acetaminophen (TYLENOL) 500 MG tablet     apixaban ANTICOAGULANT (ELIQUIS) 5 MG tablet     busPIRone (BUSPAR) 15 MG tablet     calcium citrate-vitamin D (CITRACAL) 315-250 MG-UNIT TABS     Cholecalciferol (VITAMIN D3 PO)     HEMP OIL OR EXTRACT OR OTHER CBD CANNABINOID, NOT MEDICAL CANNABIS,     loperamide (IMODIUM) 2 MG capsule     LORazepam (ATIVAN) 1 MG tablet     magnesium 250 MG tablet     morphine (MS CONTIN) 15 MG CR tablet     morphine (MSIR) 15 MG IR tablet     OLANZapine (ZYPREXA) 5 MG tablet     potassium & sodium phosphates (POTASSIUM & SODIUM PHOSPHATES) 280-160-250 MG Packet     potassium chloride ER (KLOR-CON M) 10 MEQ CR tablet     prednisoLONE acetate (PRED FORTE) 1 % ophthalmic suspension     promethazine (PHENERGAN) 25 MG tablet     propranolol ER (INDERAL LA) 80 MG 24 hr capsule     senna-docusate (SENOKOT-S/PERICOLACE) 8.6-50 MG tablet     traZODone (DESYREL) 50 MG tablet     venlafaxine (EFFEXOR-XR) 75 MG 24 hr capsule     No current facility-administered medications for this visit.         Allergies   Allergen Reactions     Bactrim [Sulfamethoxazole W/Trimethoprim]      Internal bleeding     Copaxone [Glatiramer] Hives          PHYSICAL EXAMINATION:     There were no vitals  taken for this visit.  Wt Readings from Last 4 Encounters:   02/18/21 62.4 kg (137 lb 8 oz)   02/17/21 62.5 kg (137 lb 12.8 oz)   02/10/21 60.1 kg (132 lb 8 oz)   02/09/21 56.7 kg (125 lb)           Constitutional.  Looks well and in no apparent distress.   Eyes.  Without eye redness or apparent jaundice.   Respiratory.  Non labored breathing. Speaking in full sentences.    Skin.  No concerning skin rashes on the skin visualized.   Neurological.  Is alert and oriented.  Psychiatric.  Mood and affect seem appropriate.      The rest of a comprehensive physical examination is deferred due to [facility] video visit restrictions.      Labs and Imaging.    Reviewed.  2/17/2021.  CBC shows WBC 3.6, hemoglobin 11.1, platelets 214.  ANC 2.1.    CMP shows phosphorus 4.7, albumin 3.3, ALT 56.  Otherwise it was unremarkable.    CA 27-29 was 337.      Biopsy from the right acetabulum shows metastatic lobular carcinoma.  Hormone receptor studies and HER-2 FISH is pending.    2/5/2021.  PET/CT overall shows a good response to treatment with improvement of previously hypermetabolic lesions in the spine and pelvis and stability of other bone meta stasis.  There is a single lytic lesion in the right iliac bone which demonstrates slight Yimi increased FDG uptake and has SUV max 4.7 while previously it was SUV max 3.4.  There was diffuse wall thickening of the esophagus with uptake in the esophagus in the fundus of the stomach and this could be seen with inflammation.      ASSESSMENT AND PLAN:   1.  History of stage IIB, T2 N1 M0 invasive lobular carcinoma of the right breast.  It was initially diagnosed in 2006 and at that time it was hormone receptor positive, HER-2 negative.  She was treated on ECOG 2104 protocol with dose-dense Adriamycin, Cytoxan and Avastin x4 cycles followed by Taxol and Avastin x4 cycles followed by a Avastin for 1 year.  She also received radiation to the right breast which she completed in January  2007 (5040 cGy).  She took Aromasin from 2007 to 2014.    Now she has metastatic breast cancer with extensive involvement of the bones.  There is also a left lower lobe hypermetabolic lesion and mediastinal lymph nodes which are hypermetabolic.  The biopsy from the right iliac bone is consistent with metastatic lobular breast cancer but it is hormone receptor and HER-2 negative and PDL 1 is also negative.    She has received 3000 cGy of palliative radiation to T12-L5 from 9/6/2019 till 9/18/2019.    She was started on palliative Xeloda but she was unable to tolerate even the dose reduced medication.        We decided on repeating scans and MRI brain on 10/25/2019 showed no intracranial metastatic disease.   Multiple enhancing calvarial lesions may be increased in number and are suggestive of metastatic disease. Thinner imaging on the current study may identify the smaller lesions and may be responsible for identified more lesions.   There is a single focus of T2 hyperintense signal within the anterior right temporal lobe may represent interval demyelination. There is no evidence for active demyelination.    PET/CT on 11/1/2019 showed favorable response to therapy and Overall FDG uptake within scattered osseous metastases is decreased since 8/30/2019, particularly within the pelvis. Increased sclerotic appearance of L1 and L4 pathologic compression deformities, likely sequela of recently completed palliative radiation therapy.    No significant FDG uptake in previously demonstrated hypermetabolic mediastinal lymph nodes. Biopsy negative on 9/5/2019.  There is also decreased FDG uptake in the left lower lobe (biopsy negative for malignancy), now with dense consolidation containing air bronchograms suggestive of infection versus aspiration.  There is diffuse FDG uptake within the esophagus, consistent with esophagitis.    Because of intolerance to Xeloda we decided on switching to weekly paclitaxel on 11/5/2019.    For  better tolerance we decreased the dose of paclitaxel to 70 mg/m  with cycle #2.  She has completed 3 cycles of Taxol and the last chemotherapy was on 1/14/2020.      PET/CT on 1/24/2020 showed progression of the disease in some of the bone lesions including increase in size and lytic lesion within the vertebral body of C4 measuring 1 cm as opposed to 0.6 cm previously and a new central soft tissue nodule with SUV max 4.1 when previously it was non-hypermetabolic.  There is also some progression noted in the right proximal femur and right iliac bone.  There is decreased metabolic uptake in the right lamina of T9 with SUV max 4.7 when previously it was 8.0.  Other lesions are stable.    There are no pathologically enlarged mediastinal, hilar or axillary lymph nodes. Calcified subcarinal lymph nodes. There are no suspicious lung nodules or evidence for infection and previous left lower lobe consolidation has resolved.     CA 27-29 also had increased significantly and it was 257 on 1/28/2020.    Due to progression we switched to eribulin on 1/28/2020.        After 3 cycles, she had a repeat PET/CT on 3/27/2020 which showed a stable size of the bone metastatic lesions although the FDG avidity was less, consistent with treatment response.    She had MRI of the thoracic spine on 4/3/2020 and it was compared to the one which was done in August 2019 and it showed increased size of several metastatic lesions most notably at T9 but also at T5-T7.  Other lesions at T10, T11 and T12 appeared improved.    CA 27-29 was also down to 222 on 3/18/2020.    I believe overall this is consistent with a partial response to the treatment.  Overall she is tolerating eribulin well with some worsening of neuropathy and cytopenias.     We decided on continuing the chemotherapy with some modifications and she got cycle #4 with slightly decreased dose of eribulin to 1.2 mg/m2.      After 8 cycles of eribulin repeat PET CT on 7/17/2020 showed  only minimally increased FDG uptake in T9 and T10 and in the left anterior fifth rib near the costochondral junction.  That is slightly decreased FDG avidity in the right femoral neck lesion.  Otherwise bone disease is a stable.  No other evidence of metastatic disease is found.    CA 27-29 on 6/30/2020 was 308.    We decided on continuing the same chemotherapy because overall clinical picture was consistent with stable to only minimally progressive disease and she was tolerating treatments well with decent quality of life.    Cycle #10 was delayed by 1 week because ANC was 0.9.        After 13 cycles of eribulin, PET scan on 11/6/2020 shows progression of the disease with increased FDG uptake in the lytic lesions in the T9/T10 and right posterolateral elements as well as increased FDG uptake in the previously known hypermetabolic right iliac bone lesion suggesting recurrence of previously treated metastasis.  There is new subtle lesion in the right manubrium.  There is also a new fracture in the anterolateral left fourth rib with associated uptake and this could be a pathologic fracture.  Healing fracture in the left anterior fifth rib lesion.  There is resolution of the left pulmonary emboli.  Decreased FDG uptake of the esophagus consistent with improving inflammation.    Because of progression of the disease, I recommend switching to recently FDA approved sacituzumab govitecan-hziy (Trodelvy) for TNBC, based on the results of the phase II IMMU-132-01 clinical trial.   We discussed the rational schedule and potential side effects of it in detail.    We gave it to her on 11/11/2020.    She had significant nausea and vomiting with it but otherwise tolerated it well.   She started cycle #2 on 12/2/2020 and received day 8 on 12/8/2020.  She then got admitted for nausea vomiting dehydration and weakness.     We delayed the start of cycle number 3 x 1 week and decrease the dose of chemotherapy by 25% and she also has  neutropenia with ANC of 1.  She started cycle number 3-day #1 on 12/29/2020.    CA 27-29 was 392.    Cycle number 3-day #8 1/5/2021.  She has been tolerating the reduced dose chemotherapy well.  After 4 cycles of chemotherapy, overall the PET scan shows positive response to treatment apart from mildly increased FDG avidity in one of the right iliac bone lesions.  CA 27-29 was 454 slightly elevated from before.    We decided on continuing the same chemotherapy.      She obtained a second opinion from Dr. Castillo from [facility] who recommended a repeat biopsy to be done to make sure that she really has triple negative breast cancer.    She had repeat biopsy done from the right acetabulum on 2/25/2021 which showed metastatic lobular breast cancer.  At this time hormone receptor studies and HER-2 FISH is pending.  We will follow that.  I would also like to send the tumor for foundation 1 or [facility] mutation analysis.    Tomorrow she will start with her next cycle of Trodelvy.    Nausea vomiting.  This was under good control this time and we will continue with Emend Decadron and Zofran.  She will continue takes Compazine.        Bone disease.  She has metastatic disease to the bone.  Previously she got palliative radiation to T12-L5 3000 cGy in 10 fractions from 9/6/2019 till 9/18/2019.  She was evaluated by orthopedics Dr. Bipin Elliott on 11/1/2019 and conservative management was recommended.    She was on Zometa every 3 months. She last received on 9/29/2020.  Because of progression of the disease in the bones, I recommended switching to Xgeva which she received on 11/11/2020.  She last got Xgeva on 2/17/2021.  Continue calcium and vitamin D.  Continue Xgeva monthly.      Pain management.  She is following with palliative care.  Continue morphine sulfate and MS Contin.       Leukopenia/Neutropenia.  We delayed chemotherapy by 1 week and also dose reduced it by 25% starting cycle #3.    She continues  to have mild leukopenia but neutrophil count is normal.  Repeat labs tomorrow.      Anemia.  She has mild anemia from chemotherapy.  Continue to observe.      Bilateral pulmonary emboli.  Continue Eliquis for incidentally found PE on 7/17/2020.       Neuropathy.  She has noticed slightly worse neuropathy in the hands.  Continue to observe closely.  She has underlying chronic balance issues and heat and cold sensitivity from multiple sclerosis.      Insomnia.  Refill trazodone today.    We did not address the following today.  Wall thickening of esophagus and FDG uptake of fundus of the stomach.  It could be in the setting of inflammation from recent nausea and vomiting.  Symptoms have resolved.  Continue to monitor clinically.      Subcutaneous nodule in the scalp.  I am not certain whether it is a cyst or a metastatic deposit.  PET scan does not cause this to light up.  Continue to observe.      Vision changes.    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  It was thought that this could be due to Taxol as patient reported to the ophthalmologist that she was on Taxol in September 2019 when her symptoms started.  But she was not on Taxol in September 2019 when she started noticing the visual changes so Taxol is not responsible for this.  The first dose of Taxol was on 11/5/2019.   She had left cataract extraction on 2/8/2021 and she has noticed significant improvement in her vision.  She plans to do cataract extraction of the right eye in couple of weeks.          Increased FDG ability in the colon.  She had FDG uptake in the cecum and ascending colon but no corresponding lesion was seen on the CT scan.  She is completely asymptomatic so at this time we will continue to observe.    Discussion regarding healthcare directives.  On previous visit she told me that she will bring the health care directive for our records but she forgot so I told her to bring it on next visit.       Breast implant removal.  She tells me that she was planning to do breast implant removal because there was a recall on this.  Her surgery was scheduled for 9/24/2019.  Because of this new development of metastatic breast cancer and her recent radiation, surgery has been canceled.  We discussed that at this time treatment for metastatic breast cancer would take precedence over the other surgery as the other surgery would require her to be off chemotherapy for several weeks and in that case the chances of cancer progression would be high and I would not recommend that.    Multiple sclerosis.  She will continue to follow with her neurologist Dr. Quiles.  Currently multiple sclerosis is under control and currently she is not taking any medications.      Return to clinic in 3 weeks.    All of her questions were answered to her satisfaction.  She is agreeable and comfortable with the plan.  Dewayne Zepeda MD    Video start time. 8:13 AM  Video stop time. 8:21 AM

## 2021-03-02 NOTE — LETTER
3/2/2021         RE: Shaneka Alvarez  [address] [facility] [id number]        Dear Colleague,    Thank you for referring your patient, Shaneka Alvarez, to the [facility]. Please see a copy of my visit note below.      ONCOLOGY FOLLOW UP NOTE: 3/2/2021        I took the history from reviewing the previous notes that she was following with Dr. Amaya.  I have copied and updated from prior notes.    ONCOLOGY History:    1.  January 2006:  Diagnosed with Stage IIB, T2 N1 M0 invasive lobular carcinoma of the right breast.  Final pathology showed a 4.5 x 3.8 x 2.5 cm, 01/14 lymph nodes positive.  Estrogen, progesterone receptor positive, HER-2 negative.     2.  Genetics.  BRCA1 and 2 mutations not detected. 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       THERAPY TO DATE:   1. 2006:  ECOG 2104 protocol of dose-dense Adriamycin, Cytoxan and Avastin x4 cycles followed by Taxol and Avastin x4 cycles.   2.  03/2007:  Completed 1 year Avastin.   3.  11/2006-01/2007:  Radiotherapy to the right breast of 5040 cGy.   4.  [id number]/[id number]:  Aromasin.  Stopped after moving to the [facility].   5.  01/2016:  Right modified radical mastectomy with latissimus dorsi flap reconstruction and a left prophylactic mastectomy with latissimus dorsi flap reconstruction.     She recently had MRI of the brain as a follow-up for multiple sclerosis and there were multiple calvarial lesions noted which was suspicious for metastatic disease.  There was no evidence of new multiple sclerosis lesions.  She had a PET scan on 8/30/2019 which showed widespread bone metastatic lesions (calvarium, spine, sacrum, pelvis, ribs most prominent at C7, T3, L1 and L4), hypermetabolic 3 cm lesion in LLL, and mediastinal/hilar LN. CEA wnl at 1.9 and C27-29 elevated to 415 (28 on 8/23/16). A CT guided biopsy of the right iliac bone was obtained on 9/4/19, pathology consistent with metastatic adenocarcinoma (breast),  ER/MD negative, HER-2 negative PDL 1 < 1%. A second biopsy was take of the LLL nodule via EBUS on 9/5/19 did not show any malignancy.    C/T/L spine MRI 8/3/19 to 9/2/19 with numerous enhancing lesions of the C, T and L spine, largest around T9 and L1. No evidence of cord compression. Has a L1 compression fracture and impending L4.     Received radiation T12-L5 3000 cGy in 10 fractions from 9/6/2019 till 9/18/2019.  Neurosurgery recommended no surgical intervention but to wear [place] brace when out of bed and HOB >30 degrees    She started palliative Xeloda 2000/1500 on 10/1/2019 but after just a few doses she noticed nausea, red eyes blurred vision, loss of appetite.  She stopped taking it after 5 doses and was seen by nurse practitioner on 10/7/2019 when she was improving.  At that point she was restarted on Xeloda at a lower dose of 1000 mg twice a day.  As she was not able to tolerate even the lower dose with extreme nausea and vomiting and feeling extremely fatigued and blurry vision, we decided on stopping Xeloda.    We decided on repeating scans and MRI brain on 10/25/2019 showed no intracranial metastatic disease.   Multiple enhancing calvarial lesions may be increased in number and are suggestive of metastatic disease. Thinner imaging on the current study may identify the smaller lesions and may be responsible for  identified more lesions.   There is a single focus of T2 hyperintense signal within the anterior right temporal lobe may represent interval demyelination. There is no evidence for active demyelination.    PET/CT on 11/1/2019 showed favorable response to therapy and Overall FDG uptake within scattered osseous metastases is decreased since 8/30/2019, particularly within the pelvis. Increased sclerotic appearance of L1 and L4 pathologic compression deformities, likely sequela of recently completed palliative radiation therapy.    No significant FDG uptake in previously demonstrated hypermetabolic  mediastinal lymph nodes. Biopsy negative on 9/5/2019.  There is also decreased FDG uptake in the left lower lobe (biopsy negative for  malignancy), now with dense consolidation containing air bronchograms suggestive of infection versus aspiration.  There is diffuse FDG uptake within the esophagus, consistent with esophagitis.    Because of intolerance to Xeloda we decided on switching to weekly paclitaxel on 11/5/2019.    C#2 Taxol 12/4/19- started with 70mg/m2 dose reduction    C#3 Taxol 12/30/2019     PET/CT on 1/24/2020 showed progression of the disease in some of the bone lesions including increase in size and lytic lesion within the vertebral body of C4 measuring 1 cm as opposed to 0.6 cm previously and a new central soft tissue nodule with SUV max 4.1 when previously it was non-hypermetabolic.  There is also some progression noted in the right proximal femur and right iliac bone.  There is decreased metabolic uptake in the right lamina of T9 with SUV max 4.7 when previously it was 8.0.  Other lesions are stable.    CA 27-29 also had increased significantly and it was 257 on 1/28/2020.    We decided on switching to eribulin on 1/28/2020.    C#2 2/18/2020  C#2 D#8 2/25/2020    C#3 D#1 3/18/2020 -delayed by 1 week as per patient's preference because she wanted to visit her family.    She had a repeat PET/CT on 3/27/2020 which showed stable size of the bone metastatic lesions although the FDG avidity was less, consistent with treatment response.    She had MRI of the thoracic spine on 4/3/2020 and it was compared to the one which was done in August 2019 and it showed increased size of several metastatic lesions most notably at T9 but also at T5-T7.  Other lesions at T10, T11 and T12 appeared improved.    CA 27-29 was also down to 222 on 3/18/2020.    Cycle #4 eribulin ( dose reduced to 1.2 mg/m2 )-4/7/2020-   Cycle #5 Eribulin ( 1.2 mg/m2 )- 4/28/2020   Cycle #6 Eribulin ( 1.2 mg/m2 )- 5/19/2020     Cycle #7  Eribulin ( 1.2 mg/m2 )- 6/9/2020    Cycle #8 Eribulin ( 1.2 mg/m2 )- 6/30/2020     She had a repeat PET scan on 7/17/2020 which showed incidental finding of new acute bilateral pulmonary embolism in the distal left main pulmonary artery extending in the left upper and lower lobes and in the segmental and branch arteries in the right lower lobe.    It also showed mildly increased FDG avidity in the lytic lesion in the T9 lamina and right pedicle with SUV max 5.3, previously 3.9.  That is also slightly increased FDG uptake to the left anterior fifth rib at the costochondral junction SUV max 3.9, previously 2.5.  There is slightly decreased FDG uptake in the right femoral neck lesion SUV max 2.2, previously 2.9.  FDG uptake in other bone lesions is fairly stable.  No new metastasis are seen.    CA 27-29 was 308 on 6/30/2020.  It was 286 on 5/19/2020.    Overall this is consistent with only slight progression versus stable disease.    She was started on Lovenox.    Cycle #9 eribulin 7/21/2020. CA 27-29 was 238    C#10 eribulin 8/18/2020. ( delayed by one week for ANC 0.9 )    C#11 Eribulin 9/8/2020    C#12 Eribulin 9/29/2020     C#13 Eribulin 10/20/2020     PET scan on 11/6/2020 shows progression of the disease with increased FDG uptake in the lytic lesions in the T9/T10 and right posterolateral elements as well as increased FDG uptake in the previously known hypermetabolic right iliac bone lesion suggesting recurrence of previously treated metastasis.  There is new subtle lesion in the right manubrium.  There is also a new fracture in the anterolateral left fourth rib with associated uptake and this could be a pathologic fracture.  Healing fracture in the left anterior fifth rib lesion.  There is resolution of the left pulmonary emboli.  Decreased FDG uptake of the esophagus consistent with improving inflammation.    CA 27-29 on 10/20/2020 was also elevated at 489.    C#1 Trodelvy on 11/11/2020.  Cycle #2 Trodelvy  12/2/2020  Cycle number 2-day #8 Trodelvy 12/8/2020.    12/15/2020-12/16/2020.  She was admitted to the hospital with nausea vomiting and dehydration.  She had tachycardia and initial lactic acid of 5.  It decreased to 1.4 after 2 L of normal saline.  She also had hypokalemia and ANC was 1.3.  She was treated with IV fluids.  Procalcitonin was negative.  CTA of the chest was negative for pulmonary embolism or pneumonia.  No bacterial infection was documented.  Her medication which she was initially unable to tolerate at home, were restarted and she was discharged home once started to feel better.      We delayed the start of cycle number 3 x 1 week and decrease the dose of chemotherapy by 25%.  She also had neutropenia with ANC of 1.0.      She started cycle number 3-day #1 on 12/29/2020.  CA 27-29 was 392.    Cycle number 3-day #8 1/5/2021.    C#4 Trodelvy 1/20/2021- 75% dose    2/5/2021.  PET/CT overall shows a good response to treatment with improvement of previously hypermetabolic lesions in the spine and pelvis and stability of other bone meta stasis.  There is a single lytic lesion in the right iliac bone which demonstrates slight Yimi increased FDG uptake and has SUV max 4.7 while previously it was SUV max 3.4.  There was diffuse wall thickening of the esophagus with uptake in the esophagus in the fundus of the stomach and this could be seen with inflammation.    Trodelvy cycle #5 - 2/10/2021.  75% of the dose.  CA 27-29 was 337.    Trodelvy cycle #6 - 3/3/2021.  75% of the dose.        Interval history.  This is a video visit.    On 2/25/2020 when she had biopsy of the right acetabulum and it was consistent with metastatic lobular carcinoma.  Receptor studies and HER-2/sheldon FISH is pending.   She tells me that the last chemotherapy went pretty well.  Nausea was under good control.  She had some constipation but after that she had an episode of diarrhea but after that bowel movements were fine.  Pain is under  fair control and she continues to be on morphine.  Continues to take calcium and vitamin D.  She denies any fevers infections or shortness of breath.  She has noticed that tingling and numbness in the fingertips is slightly worse.  No new swellings.  Overall energy is decent.      ECOG 1    ROS:  A comprehensive ROS was otherwise neg          I reviewed other history in epic as below.       PAST MEDICAL HISTORY:     1.  Breast cancer  2.  Multiple sclerosis.   3.  Depression.   4.  Migraines.   5.  Hypertension.   6.  Cholecystectomy and umbilical hernia repair.     SOCIAL HISTORY: She smoked for 5 years but quit many years ago.  Drinks alcohol socially.  She lives with her .  She is a teacher in middle school.       FAMILY HISTORY: She mentions that her mother had breast cancer at 49.  Both grandmothers had breast cancer but she is not sure of the age.  A couple of cousins have cancers.  Patient has 3 children who are healthy.    Current Outpatient Medications   Medication     acetaminophen (TYLENOL) 500 MG tablet     apixaban ANTICOAGULANT (ELIQUIS) 5 MG tablet     busPIRone (BUSPAR) 15 MG tablet     calcium citrate-vitamin D (CITRACAL) 315-250 MG-UNIT TABS     Cholecalciferol (VITAMIN D3 PO)     HEMP OIL OR EXTRACT OR OTHER CBD CANNABINOID, NOT MEDICAL CANNABIS,     loperamide (IMODIUM) 2 MG capsule     LORazepam (ATIVAN) 1 MG tablet     magnesium 250 MG tablet     morphine (MS CONTIN) 15 MG CR tablet     morphine (MSIR) 15 MG IR tablet     OLANZapine (ZYPREXA) 5 MG tablet     potassium & sodium phosphates (POTASSIUM & SODIUM PHOSPHATES) 280-160-250 MG Packet     potassium chloride ER (KLOR-CON M) 10 MEQ CR tablet     prednisoLONE acetate (PRED FORTE) 1 % ophthalmic suspension     promethazine (PHENERGAN) 25 MG tablet     propranolol ER (INDERAL LA) 80 MG 24 hr capsule     senna-docusate (SENOKOT-S/PERICOLACE) 8.6-50 MG tablet     traZODone (DESYREL) 50 MG tablet     venlafaxine (EFFEXOR-XR) 75 MG 24 hr  capsule     No current facility-administered medications for this visit.         Allergies   Allergen Reactions     Bactrim [Sulfamethoxazole W/Trimethoprim]      Internal bleeding     Copaxone [Glatiramer] Hives          PHYSICAL EXAMINATION:     There were no vitals taken for this visit.  Wt Readings from Last 4 Encounters:   02/18/21 62.4 kg (137 lb 8 oz)   02/17/21 62.5 kg (137 lb 12.8 oz)   02/10/21 60.1 kg (132 lb 8 oz)   02/09/21 56.7 kg (125 lb)           Constitutional.  Looks well and in no apparent distress.   Eyes.  Without eye redness or apparent jaundice.   Respiratory.  Non labored breathing. Speaking in full sentences.    Skin.  No concerning skin rashes on the skin visualized.   Neurological.  Is alert and oriented.  Psychiatric.  Mood and affect seem appropriate.      The rest of a comprehensive physical examination is deferred due to [facility] video visit restrictions.      Labs and Imaging.    Reviewed.  2/17/2021.  CBC shows WBC 3.6, hemoglobin 11.1, platelets 214.  ANC 2.1.    CMP shows phosphorus 4.7, albumin 3.3, ALT 56.  Otherwise it was unremarkable.    CA 27-29 was 337.      Biopsy from the right acetabulum shows metastatic lobular carcinoma.  Hormone receptor studies and HER-2 FISH is pending.    2/5/2021.  PET/CT overall shows a good response to treatment with improvement of previously hypermetabolic lesions in the spine and pelvis and stability of other bone meta stasis.  There is a single lytic lesion in the right iliac bone which demonstrates slight Yimi increased FDG uptake and has SUV max 4.7 while previously it was SUV max 3.4.  There was diffuse wall thickening of the esophagus with uptake in the esophagus in the fundus of the stomach and this could be seen with inflammation.      ASSESSMENT AND PLAN:   1.  History of stage IIB, T2 N1 M0 invasive lobular carcinoma of the right breast.  It was initially diagnosed in 2006 and at that time it was hormone receptor  positive, HER-2 negative.  She was treated on ECOG 2104 protocol with dose-dense Adriamycin, Cytoxan and Avastin x4 cycles followed by Taxol and Avastin x4 cycles followed by a Avastin for 1 year.  She also received radiation to the right breast which she completed in January 2007 (5040 cGy).  She took Aromasin from 2007 to 2014.    Now she has metastatic breast cancer with extensive involvement of the bones.  There is also a left lower lobe hypermetabolic lesion and mediastinal lymph nodes which are hypermetabolic.  The biopsy from the right iliac bone is consistent with metastatic lobular breast cancer but it is hormone receptor and HER-2 negative and PDL 1 is also negative.    She has received 3000 cGy of palliative radiation to T12-L5 from 9/6/2019 till 9/18/2019.    She was started on palliative Xeloda but she was unable to tolerate even the dose reduced medication.        We decided on repeating scans and MRI brain on 10/25/2019 showed no intracranial metastatic disease.   Multiple enhancing calvarial lesions may be increased in number and are suggestive of metastatic disease. Thinner imaging on the current study may identify the smaller lesions and may be responsible for identified more lesions.   There is a single focus of T2 hyperintense signal within the anterior right temporal lobe may represent interval demyelination. There is no evidence for active demyelination.    PET/CT on 11/1/2019 showed favorable response to therapy and Overall FDG uptake within scattered osseous metastases is decreased since 8/30/2019, particularly within the pelvis. Increased sclerotic appearance of L1 and L4 pathologic compression deformities, likely sequela of recently completed palliative radiation therapy.    No significant FDG uptake in previously demonstrated hypermetabolic mediastinal lymph nodes. Biopsy negative on 9/5/2019.  There is also decreased FDG uptake in the left lower lobe (biopsy negative for malignancy), now  with dense consolidation containing air bronchograms suggestive of infection versus aspiration.  There is diffuse FDG uptake within the esophagus, consistent with esophagitis.    Because of intolerance to Xeloda we decided on switching to weekly paclitaxel on 11/5/2019.    For better tolerance we decreased the dose of paclitaxel to 70 mg/m  with cycle #2.  She has completed 3 cycles of Taxol and the last chemotherapy was on 1/14/2020.      PET/CT on 1/24/2020 showed progression of the disease in some of the bone lesions including increase in size and lytic lesion within the vertebral body of C4 measuring 1 cm as opposed to 0.6 cm previously and a new central soft tissue nodule with SUV max 4.1 when previously it was non-hypermetabolic.  There is also some progression noted in the right proximal femur and right iliac bone.  There is decreased metabolic uptake in the right lamina of T9 with SUV max 4.7 when previously it was 8.0.  Other lesions are stable.    There are no pathologically enlarged mediastinal, hilar or axillary lymph nodes. Calcified subcarinal lymph nodes. There are no suspicious lung nodules or evidence for infection and previous left lower lobe consolidation has resolved.     CA 27-29 also had increased significantly and it was 257 on 1/28/2020.    Due to progression we switched to eribulin on 1/28/2020.        After 3 cycles, she had a repeat PET/CT on 3/27/2020 which showed a stable size of the bone metastatic lesions although the FDG avidity was less, consistent with treatment response.    She had MRI of the thoracic spine on 4/3/2020 and it was compared to the one which was done in August 2019 and it showed increased size of several metastatic lesions most notably at T9 but also at T5-T7.  Other lesions at T10, T11 and T12 appeared improved.    CA 27-29 was also down to 222 on 3/18/2020.    I believe overall this is consistent with a partial response to the treatment.  Overall she is tolerating  eribulin well with some worsening of neuropathy and cytopenias.     We decided on continuing the chemotherapy with some modifications and she got cycle #4 with slightly decreased dose of eribulin to 1.2 mg/m2.      After 8 cycles of eribulin repeat PET CT on 7/17/2020 showed only minimally increased FDG uptake in T9 and T10 and in the left anterior fifth rib near the costochondral junction.  That is slightly decreased FDG avidity in the right femoral neck lesion.  Otherwise bone disease is a stable.  No other evidence of metastatic disease is found.    CA 27-29 on 6/30/2020 was 308.    We decided on continuing the same chemotherapy because overall clinical picture was consistent with stable to only minimally progressive disease and she was tolerating treatments well with decent quality of life.    Cycle #10 was delayed by 1 week because ANC was 0.9.        After 13 cycles of eribulin, PET scan on 11/6/2020 shows progression of the disease with increased FDG uptake in the lytic lesions in the T9/T10 and right posterolateral elements as well as increased FDG uptake in the previously known hypermetabolic right iliac bone lesion suggesting recurrence of previously treated metastasis.  There is new subtle lesion in the right manubrium.  There is also a new fracture in the anterolateral left fourth rib with associated uptake and this could be a pathologic fracture.  Healing fracture in the left anterior fifth rib lesion.  There is resolution of the left pulmonary emboli.  Decreased FDG uptake of the esophagus consistent with improving inflammation.    Because of progression of the disease, I recommend switching to recently FDA approved sacituzumab govitecan-hziy (Trodelvy) for TNBC, based on the results of the phase II IMMU-132-01 clinical trial.   We discussed the rational schedule and potential side effects of it in detail.    We gave it to her on 11/11/2020.    She had significant nausea and vomiting with it but  otherwise tolerated it well.   She started cycle #2 on 12/2/2020 and received day 8 on 12/8/2020.  She then got admitted for nausea vomiting dehydration and weakness.     We delayed the start of cycle number 3 x 1 week and decrease the dose of chemotherapy by 25% and she also has neutropenia with ANC of 1.  She started cycle number 3-day #1 on 12/29/2020.    CA 27-29 was 392.    Cycle number 3-day #8 1/5/2021.  She has been tolerating the reduced dose chemotherapy well.  After 4 cycles of chemotherapy, overall the PET scan shows positive response to treatment apart from mildly increased FDG avidity in one of the right iliac bone lesions.  CA 27-29 was 454 slightly elevated from before.    We decided on continuing the same chemotherapy.      She [facility] recommended a repeat biopsy to be done to make sure that she really has triple negative breast cancer.    She had repeat biopsy done from the right acetabulum on 2/25/2021 which showed metastatic lobular breast cancer.  At this time hormone receptor studies and HER-2 FISH is pending.  We will follow that.  I would also like to send the tumor for foundation 1 or [employer] mutation analysis.    Tomorrow she will start with her next cycle of Trodelvy.    Nausea vomiting.  This was under good control this time and we will continue with Emend Decadron and Zofran.  She will continue takes Compazine.        Bone disease.  She has metastatic disease to the bone.  Previously she got palliative radiation to T12-L5 3000 cGy in 10 fractions from 9/6/2019 till 9/18/2019.  She was evaluated by orthopedics Dr. Bipin Elliott on 11/1/2019 and conservative management was recommended.    She was on Zometa every 3 months. She last received on 9/29/2020.  Because of progression of the disease in the bones, I recommended switching to Xgeva which she received on 11/11/2020.  She last got Xgeva on 2/17/2021.  Continue  calcium and vitamin D.  Continue Xgeva monthly.      Pain management.  She is following with palliative care.  Continue morphine sulfate and MS Contin.       Leukopenia/Neutropenia.  We delayed chemotherapy by 1 week and also dose reduced it by 25% starting cycle #3.    She continues to have mild leukopenia but neutrophil count is normal.  Repeat labs tomorrow.      Anemia.  She has mild anemia from chemotherapy.  Continue to observe.      Bilateral pulmonary emboli.  Continue Eliquis for incidentally found PE on 7/17/2020.       Neuropathy.  She has noticed slightly worse neuropathy in the hands.  Continue to observe closely.  She has underlying chronic balance issues and heat and cold sensitivity from multiple sclerosis.      Insomnia.  Refill trazodone today.    We did not address the following today.  Wall thickening of esophagus and FDG uptake of fundus of the stomach.  It could be in the setting of inflammation from recent nausea and vomiting.  Symptoms have resolved.  Continue to monitor clinically.      Subcutaneous nodule in the scalp.  I am not certain whether it is a cyst or a metastatic deposit.  PET scan does not cause this to light up.  Continue to observe.      Vision changes.    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  It was thought that this could be due to Taxol as patient reported to the ophthalmologist that she was on Taxol in September 2019 when her symptoms started.  But she was not on Taxol in September 2019 when she started noticing the visual changes so Taxol is not responsible for this.  The first dose of Taxol was on 11/5/2019.   She had left cataract extraction on 2/8/2021 and she has noticed significant improvement in her vision.  She plans to do cataract extraction of the right eye in couple of weeks.          Increased FDG ability in the colon.  She had FDG uptake in the cecum and ascending colon but no corresponding lesion was seen on the CT  scan.  She is completely asymptomatic so at this time we will continue to observe.    Discussion regarding healthcare directives.  On previous visit she told me that she will bring the health care directive for our records but she forgot so I told her to bring it on next visit.      Breast implant removal.  She tells me that she was planning to do breast implant removal because there was a recall on this.  Her surgery was scheduled for 9/24/2019.  Because of this new development of metastatic breast cancer and her recent radiation, surgery has been canceled.  We discussed that at this time treatment for metastatic breast cancer would take precedence over the other surgery as the other surgery would require her to be off chemotherapy for several weeks and in that case the chances of cancer progression would be high and I would not recommend that.    Multiple sclerosis.  She will continue to follow with her neurologist Dr. Quiles.  Currently multiple sclerosis is under control and currently she is not taking any medications.      Return to clinic in 3 weeks.    All of her questions were answered to her satisfaction.  She is agreeable and comfortable with the plan.  Dewayne Zepeda MD    Video start time. 8:13 AM  Video stop time. 8:21 AM              Again, thank you for allowing me to participate in the care of your patient.        Sincerely,        Dewayne Zepeda MD

## 2021-03-02 NOTE — PROGRESS NOTES
Completed requisition order form to [facility].  Pathology from 2/25/21, face sheet was faxed to [phone].  FAX confirmed as received.

## 2021-03-02 NOTE — NURSING NOTE
Shaneka is a 60 year old who is being evaluated via a billable video visit.      How would you like to obtain your AVS? MyChart  If the video visit is dropped, the invitation should be resent by: Text to cell phone: [phone]  Will anyone else be joining your video visit? No    Video-Visit Details    Type of service:  Video Visit    Originating Location (pt. Location): Home    Distant Location (provider location):  [facility]     Platform used for Video Visit: Imtiaz Thakur [facility]

## 2021-03-03 ENCOUNTER — OFFICE VISIT (OUTPATIENT)
Dept: ONCOLOGY | Facility: CLINIC | Age: 61
End: 2021-03-03
Payer: COMMERCIAL

## 2021-03-03 ENCOUNTER — INFUSION THERAPY VISIT (OUTPATIENT)
Dept: INFUSION THERAPY | Facility: CLINIC | Age: 61
End: 2021-03-03
Payer: COMMERCIAL

## 2021-03-03 VITALS
HEART RATE: 68 BPM | RESPIRATION RATE: 18 BRPM | OXYGEN SATURATION: 100 % | TEMPERATURE: 97.9 F | WEIGHT: 134.3 LBS | SYSTOLIC BLOOD PRESSURE: 107 MMHG | BODY MASS INDEX: 24.86 KG/M2 | DIASTOLIC BLOOD PRESSURE: 73 MMHG

## 2021-03-03 DIAGNOSIS — T45.1X5A CHEMOTHERAPY-INDUCED NEUTROPENIA (H): ICD-10-CM

## 2021-03-03 DIAGNOSIS — D70.1 CHEMOTHERAPY-INDUCED NEUTROPENIA (H): ICD-10-CM

## 2021-03-03 DIAGNOSIS — C50.911 BILATERAL MALIGNANT NEOPLASM OF BREAST IN FEMALE, UNSPECIFIED ESTROGEN RECEPTOR STATUS, UNSPECIFIED SITE OF BREAST (H): ICD-10-CM

## 2021-03-03 DIAGNOSIS — E87.6 HYPOKALEMIA: Primary | ICD-10-CM

## 2021-03-03 DIAGNOSIS — C50.919 METASTATIC BREAST CANCER: ICD-10-CM

## 2021-03-03 DIAGNOSIS — C50.912 BILATERAL MALIGNANT NEOPLASM OF BREAST IN FEMALE, UNSPECIFIED ESTROGEN RECEPTOR STATUS, UNSPECIFIED SITE OF BREAST (H): ICD-10-CM

## 2021-03-03 LAB
ALBUMIN SERPL-MCNC: 3.4 G/DL (ref 3.4–5)
ALP SERPL-CCNC: 60 U/L (ref 40–150)
ALT SERPL W P-5'-P-CCNC: 28 U/L (ref 0–50)
ANION GAP SERPL CALCULATED.3IONS-SCNC: 3 MMOL/L (ref 3–14)
AST SERPL W P-5'-P-CCNC: 15 U/L (ref 0–45)
BASOPHILS # BLD AUTO: 0 10E9/L (ref 0–0.2)
BASOPHILS NFR BLD AUTO: 0.8 %
BILIRUB SERPL-MCNC: 0.3 MG/DL (ref 0.2–1.3)
BUN SERPL-MCNC: 13 MG/DL (ref 7–30)
CALCIUM SERPL-MCNC: 8.3 MG/DL (ref 8.5–10.1)
CANCER AG27-29 SERPL-ACNC: 422 U/ML (ref 0–39)
CHLORIDE SERPL-SCNC: 103 MMOL/L (ref 94–109)
CO2 SERPL-SCNC: 29 MMOL/L (ref 20–32)
CREAT SERPL-MCNC: 0.72 MG/DL (ref 0.52–1.04)
DIFFERENTIAL METHOD BLD: ABNORMAL
EOSINOPHIL # BLD AUTO: 0.1 10E9/L (ref 0–0.7)
EOSINOPHIL NFR BLD AUTO: 2.4 %
ERYTHROCYTE [DISTWIDTH] IN BLOOD BY AUTOMATED COUNT: 15.7 % (ref 10–15)
GFR SERPL CREATININE-BSD FRML MDRD: >90 ML/MIN/{1.73_M2}
GLUCOSE SERPL-MCNC: 95 MG/DL (ref 70–99)
HCT VFR BLD AUTO: 34.8 % (ref 35–47)
HGB BLD-MCNC: 11 G/DL (ref 11.7–15.7)
IMM GRANULOCYTES # BLD: 0 10E9/L (ref 0–0.4)
IMM GRANULOCYTES NFR BLD: 1.2 %
LYMPHOCYTES # BLD AUTO: 0.5 10E9/L (ref 0.8–5.3)
LYMPHOCYTES NFR BLD AUTO: 20.5 %
MAGNESIUM SERPL-MCNC: 2 MG/DL (ref 1.6–2.3)
MCH RBC QN AUTO: 28 PG (ref 26.5–33)
MCHC RBC AUTO-ENTMCNC: 31.6 G/DL (ref 31.5–36.5)
MCV RBC AUTO: 89 FL (ref 78–100)
MONOCYTES # BLD AUTO: 0.5 10E9/L (ref 0–1.3)
MONOCYTES NFR BLD AUTO: 19.7 %
NEUTROPHILS # BLD AUTO: 1.4 10E9/L (ref 1.6–8.3)
NEUTROPHILS NFR BLD AUTO: 55.4 %
PHOSPHATE SERPL-MCNC: 3.7 MG/DL (ref 2.5–4.5)
PLATELET # BLD AUTO: 181 10E9/L (ref 150–450)
POTASSIUM SERPL-SCNC: 3.9 MMOL/L (ref 3.4–5.3)
PROT SERPL-MCNC: 6.4 G/DL (ref 6.8–8.8)
RBC # BLD AUTO: 3.93 10E12/L (ref 3.8–5.2)
SODIUM SERPL-SCNC: 135 MMOL/L (ref 133–144)
WBC # BLD AUTO: 2.5 10E9/L (ref 4–11)

## 2021-03-03 PROCEDURE — 99207 PR NO CHARGE LOS: CPT

## 2021-03-03 PROCEDURE — 96375 TX/PRO/DX INJ NEW DRUG ADDON: CPT | Performed by: NURSE PRACTITIONER

## 2021-03-03 PROCEDURE — 96413 CHEMO IV INFUSION 1 HR: CPT | Performed by: NURSE PRACTITIONER

## 2021-03-03 PROCEDURE — 96367 TX/PROPH/DG ADDL SEQ IV INF: CPT | Performed by: NURSE PRACTITIONER

## 2021-03-03 PROCEDURE — S0028 INJECTION, FAMOTIDINE, 20 MG: HCPCS | Performed by: NURSE PRACTITIONER

## 2021-03-03 PROCEDURE — 85025 COMPLETE CBC W/AUTO DIFF WBC: CPT | Performed by: INTERNAL MEDICINE

## 2021-03-03 PROCEDURE — 84100 ASSAY OF PHOSPHORUS: CPT | Performed by: INTERNAL MEDICINE

## 2021-03-03 PROCEDURE — 83735 ASSAY OF MAGNESIUM: CPT | Performed by: INTERNAL MEDICINE

## 2021-03-03 PROCEDURE — 80053 COMPREHEN METABOLIC PANEL: CPT | Performed by: INTERNAL MEDICINE

## 2021-03-03 PROCEDURE — 86300 IMMUNOASSAY TUMOR CA 15-3: CPT | Performed by: INTERNAL MEDICINE

## 2021-03-03 RX ORDER — ACETAMINOPHEN 325 MG/1
650 TABLET ORAL ONCE
Status: COMPLETED | OUTPATIENT
Start: 2021-03-03 | End: 2021-03-03

## 2021-03-03 RX ORDER — DIPHENHYDRAMINE HCL 25 MG
50 CAPSULE ORAL ONCE
Status: COMPLETED | OUTPATIENT
Start: 2021-03-03 | End: 2021-03-03

## 2021-03-03 RX ORDER — HEPARIN SODIUM (PORCINE) LOCK FLUSH IV SOLN 100 UNIT/ML 100 UNIT/ML
5 SOLUTION INTRAVENOUS
Status: DISCONTINUED | OUTPATIENT
Start: 2021-03-03 | End: 2021-03-03 | Stop reason: HOSPADM

## 2021-03-03 RX ORDER — HEPARIN SODIUM (PORCINE) LOCK FLUSH IV SOLN 100 UNIT/ML 100 UNIT/ML
5 SOLUTION INTRAVENOUS ONCE
Status: COMPLETED | OUTPATIENT
Start: 2021-03-03 | End: 2021-03-03

## 2021-03-03 RX ADMIN — HEPARIN SODIUM (PORCINE) LOCK FLUSH IV SOLN 100 UNIT/ML 5 ML: 100 SOLUTION at 13:40

## 2021-03-03 RX ADMIN — Medication 50 MG: at 10:37

## 2021-03-03 RX ADMIN — ACETAMINOPHEN 650 MG: 325 TABLET ORAL at 10:37

## 2021-03-03 RX ADMIN — Medication 500 ML: at 10:45

## 2021-03-03 RX ADMIN — HEPARIN SODIUM (PORCINE) LOCK FLUSH IV SOLN 100 UNIT/ML 5 ML: 100 SOLUTION at 09:34

## 2021-03-03 ASSESSMENT — PAIN SCALES - GENERAL: PAINLEVEL: NO PAIN (0)

## 2021-03-03 NOTE — PROGRESS NOTES
Infusion Nursing Note:  Shaneka Alvarez presents today for Trodelvey.    Patient seen by provider today: Yes: Dr Zepeda yesterday - video visit   present during visit today: Not Applicable.    Note: Per Dr Zepeda, ok to treat patient with an ANC of 0.4 today - Pt states she is doing well, denies any problems with her last infusion,, no s/s of infection, fever etc.  Will treat as ordered, pt is tolerating Trodelvey at the 1 hour rate.  Patient did meet criteria for an asymptomatic covid-19 PCR test in infusion today. Patient declined the covid-19 test. Getting Covid vaccine tomorrow. Last tested 02/22/2021    Intravenous Access:  Implanted Port.    Treatment Conditions:  Lab Results   Component Value Date    HGB 11.0 03/03/2021     Lab Results   Component Value Date    WBC 2.5 03/03/2021      Lab Results   Component Value Date    ANEU 1.4 03/03/2021     Lab Results   Component Value Date     03/03/2021      Lab Results   Component Value Date     03/03/2021                   Lab Results   Component Value Date    POTASSIUM 3.9 03/03/2021           Lab Results   Component Value Date    MAG 2.0 03/03/2021            Lab Results   Component Value Date    CR 0.72 03/03/2021                   Lab Results   Component Value Date    RAFAELA 8.3 03/03/2021                Lab Results   Component Value Date    BILITOTAL 0.3 03/03/2021           Lab Results   Component Value Date    ALBUMIN 3.4 03/03/2021                    Lab Results   Component Value Date    ALT 28 03/03/2021           Lab Results   Component Value Date    AST 15 03/03/2021       Results reviewed, labs MET treatment parameters, ok to proceed with treatment.      Post Infusion Assessment:  Patient tolerated infusion without incident.  Blood return noted pre and post infusion.  Site patent and intact, free from redness, edema or discomfort.  No evidence of extravasations.  Access discontinued per protocol.       Discharge Plan:   Return for C6,D8  on 03/10/2021  Discharge instructions reviewed with: Patient.  Patient and/or family verbalized understanding of discharge instructions and all questions answered.  Patient discharged in stable condition accompanied by: self.  Departure Mode: Ambulatory.    Loren Costa RN

## 2021-03-04 ENCOUNTER — PRE VISIT (OUTPATIENT)
Dept: SURGERY | Facility: CLINIC | Age: 61
End: 2021-03-04

## 2021-03-04 ENCOUNTER — ANESTHESIA EVENT (OUTPATIENT)
Dept: SURGERY | Facility: AMBULATORY SURGERY CENTER | Age: 61
End: 2021-03-04

## 2021-03-04 ENCOUNTER — VIRTUAL VISIT (OUTPATIENT)
Dept: SURGERY | Facility: CLINIC | Age: 61
End: 2021-03-04
Payer: COMMERCIAL

## 2021-03-04 DIAGNOSIS — Z11.59 ENCOUNTER FOR SCREENING FOR OTHER VIRAL DISEASES: ICD-10-CM

## 2021-03-04 DIAGNOSIS — Z01.818 PREOP EXAMINATION: Primary | ICD-10-CM

## 2021-03-04 LAB
LABORATORY COMMENT REPORT: NORMAL
SARS-COV-2 RNA RESP QL NAA+PROBE: NEGATIVE
SARS-COV-2 RNA RESP QL NAA+PROBE: NORMAL
SPECIMEN SOURCE: NORMAL
SPECIMEN SOURCE: NORMAL

## 2021-03-04 PROCEDURE — 87635 SARS-COV-2 COVID-19 AMP PRB: CPT | Performed by: OPHTHALMOLOGY

## 2021-03-04 PROCEDURE — 99203 OFFICE O/P NEW LOW 30 MIN: CPT | Mod: GT | Performed by: PHYSICIAN ASSISTANT

## 2021-03-04 ASSESSMENT — LIFESTYLE VARIABLES: TOBACCO_USE: 1

## 2021-03-04 ASSESSMENT — PAIN SCALES - GENERAL: PAINLEVEL: NO PAIN (0)

## 2021-03-04 NOTE — PATIENT INSTRUCTIONS
Preparing for Your Surgery      Name:  Shaneka Alvarez   MRN:  [id number]   :  1960   Today's Date:  3/4/2021       Arriving for surgery:  Surgery date:  3/8/21  Arrival time:  08:45 am    Restrictions due to COVID 19:  One consistent visitor per patient is allowed  No ill visitors  All visitors must wear face mask     parking is available for anyone with mobility limitations or disabilities.  ([facility]  24 hours/ 7 days a week; [facility]  7 am- 3:30 pm, Mon- Fri)    Please come to:   [facility] [address] [address]  -  Proceed to the 5th floor to check into the Ambulatory Surgery Center.              >> There will be patient concierges on the 1st and 5th floor, for assistance or an escort, if you would like.              >> Please call [phone] with any questions.    What can I eat or drink?  -  You may eat and drink normally for up to 8 hours before your surgery.  -  You may have clear liquids until 4 hours before surgery.     Examples of clear liquids:  Water  Clear broth  Juices (apple, white grape, white cranberry  and cider) without pulp  Noncarbonated, powder based beverages  (lemonade and Nithin-Aid)  Sodas (Sprite, 7-Up, ginger ale and seltzer)  Coffee or tea (without milk or cream)  Gatorade    -  No Alcohol for at least 24 hours before surgery     Which medicines can I take?    Hold Aspirin for 7 days before surgery.   Hold Multivitamins for 7 days before surgery.  Hold Supplements for 7 days before surgery.  Hold Ibuprofen (Advil, Motrin) for 1 day before surgery--unless otherwise directed by surgeon.  Hold Naproxen (Aleve) for 4 days before surgery.  -  PLEASE TAKE these medications the day of surgery:  Tylenol if needed; take all morning medications.    How do I prepare myself?  - Please take 2 showers before surgery using Scrubcare or Hibiclens soap.    Use this soap only from the neck to your toes.     Leave the  soap on your skin for one minute--then rinse thoroughly.      You may use your own shampoo and conditioner; no other hair products.   - Please remove all jewelry and body piercings.  - No lotions, deodorants or fragrance.  - No makeup or fingernail polish.   - Bring your ID and insurance card.    - All patients are required to have a Covid-19 test within 4 days of surgery/procedure.      -Patients will be contacted by the [facility] scheduling team within 1 week of surgery to make an appointment.      - Patients may call the Scheduling team at [phone] if they have not been scheduled within 4 days of  surgery.      ALL PATIENTS GOING HOME THE SAME DAY OF SURGERY ARE REQUIRED TO HAVE A RESPONSIBLE ADULT TO DRIVE AND BE IN ATTENDANCE WITH THEM FOR 24 HOURS FOLLOWING SURGERY     Questions or Concerns:    - For any questions regarding the day of surgery or your hospital stay, please contact the Pre Admission Nursing Office at [phone].       - If you have health changes between today and your surgery please call your surgeon.       For questions after surgery please call your surgeons office.

## 2021-03-04 NOTE — H&P
Pre-Operative H & P     CC:  Preoperative exam to assess for increased cardiopulmonary risk while undergoing surgery and anesthesia.    Date of Encounter: 3/4/2021  Primary Care Physician:  [facility]  Associated Diagnosis: Posterior subscapular polar age-related cataract of both eyes        Video-Visit Details    Type of service:  Video Visit    Patient verbally consented to video service today: YES      Video Start Time: 1120  Video End Time (time video stopped): 1128    Originating Location (pt. Location): Home    Distant Location (provider location):  home    Mode of Communication:  Video Conference via [employer]        [facility]  Shaneka Alvarez is a 60 year old female who presents for pre-operative H & P in preparation for phacoemulsification, cataract, with intraocular lens implant, toric lens, right with Dr. Barkley on 3/8/2021 at [facility].  MAC with topical anesthesia.    Ms. Alvarez was recently diagnosed with cataracts. She has been on chemotherapy for recurrent breast cancer. Since restarting chemotherapy, she has been experiencing decreased bilateral vision. She is s/p left eye cataract surgery 2/8/21 and is happy with her result.  She is ready to proceed with the right eye.      History is obtained from the patient and chart review.      Past Medical History  Past Medical History:   Diagnosis Date     Breast cancer (H)     Age 44     Cataract     left eye     Chemotherapy induced nausea and vomiting 12/15/2020     Common migraine      Esophageal reflux      H/O bilateral mastectomy      Mild major depression (H)      Multiple sclerosis (H)      Pulmonary embolism (H)        Past Surgical History  Past Surgical History:   Procedure Laterality Date     CHOLECYSTECTOMY       ENDOBRONCHIAL ULTRASOUND FLEXIBLE N/A 09/05/2019    Procedure: Flexible Bronchoscopy, Endobronchial Ultrasound, Radial Probe;  Surgeon: Sree Sanchez MD;  Location:  OR       REMOVAL OF OVARY/TUBE(S)       HERNIA REPAIR, UMBILICAL       mastectomy  Bilateral 2006     MASTECTOMY, BILATERAL       PHACOEMULSIFICATION CLEAR CORNEA WITH TORIC INTRAOCULAR LENS IMPLANT Left 2/8/2021    Procedure: PHACOEMULSIFICATION, COMPLEX CATARACT, WITH INTRAOCULAR LENS IMPLANT, RESIDENT TORIC LENS LEFT;  Surgeon: Luis Barkley MD;  Location: [facility] OR       Hx of Blood transfusions/reactions: denies     Hx of abnormal bleeding or anti-platelet use: Eliquis    Menstrual history: No LMP recorded. Patient is postmenopausal.:     Steroid use in the last year: denies    Personal or FH with difficulty with Anesthesia:  denies    Prior to Admission Medications  Current Outpatient Medications   Medication Sig Dispense Refill     acetaminophen (TYLENOL) 500 MG tablet Take 2 tablets (1,000 mg) by mouth 3 times daily (Patient taking differently: Take 1,000 mg by mouth every 8 hours as needed ) 180 tablet 0     apixaban ANTICOAGULANT (ELIQUIS) 5 MG tablet Take 1 tablet (5 mg) by mouth 2 times daily 60 tablet 3     busPIRone (BUSPAR) 15 MG tablet Take 1 tablet (15 mg) by mouth 2 times daily 180 tablet 1     calcium citrate-vitamin D (CITRACAL) 315-250 MG-UNIT TABS Take 1 tablet by mouth every morning        Cholecalciferol (VITAMIN D3 PO) Take 2,000 Units by mouth every morning        HEMP OIL OR EXTRACT OR OTHER CBD CANNABINOID, NOT MEDICAL CANNABIS, CBD cream       loperamide (IMODIUM) 2 MG capsule Start with 2 caps (4 mg), then take one cap (2 mg) after each diarrheal stool as needed. Do not use more than 8 caps (16 mg) per day. (Patient taking differently: as needed Start with 2 caps (4 mg), then take one cap (2 mg) after each diarrheal stool as needed. Do not use more than 8 caps (16 mg) per day.) 30 capsule 0     LORazepam (ATIVAN) 1 MG tablet Take 1 tablet (1 mg) by mouth every 4 hours as needed for anxiety or nausea 30 tablet 0     magnesium 250 MG tablet Take 1 tablet by mouth every morning         morphine (MS CONTIN) 15 MG CR tablet Take 1 tablet (15 mg) by mouth every evening 30 tablet 0     morphine (MSIR) 15 MG IR tablet Take 15-30 mg by mouth every 3 hours as needed        OLANZapine (ZYPREXA) 5 MG tablet Take one tablet, starting the first day after chemotherapy for 5 days, then stop. Repeat for each chemotherapy cycle for nausea, vomiting.       potassium & sodium phosphates (POTASSIUM & SODIUM PHOSPHATES) 280-160-250 MG Packet Take 1 packet by mouth 4 times daily       potassium chloride ER (KLOR-CON M) 10 MEQ CR tablet Take 1 tablet (10 mEq) by mouth 3 times daily (Patient taking differently: Take 10 mEq by mouth 2 times daily ) 90 tablet 0     promethazine (PHENERGAN) 25 MG tablet Take 25 mg by mouth every 6 hours as needed       propranolol ER (INDERAL LA) 80 MG 24 hr capsule Take 1 capsule (80 mg) by mouth every morning 90 capsule 1     senna-docusate (SENOKOT-S/PERICOLACE) 8.6-50 MG tablet Take 1-2 tablets by mouth 2 times daily as needed for constipation . Goal is to have a bowel movement every 1-2 days. (Patient taking differently: Take 1-2 tablets by mouth as needed for constipation . Goal is to have a bowel movement every 1-2 days.)       traZODone (DESYREL) 50 MG tablet Take 1 tablet (50 mg) by mouth At Bedtime 30 tablet 1     venlafaxine (EFFEXOR-XR) 75 MG 24 hr capsule Take 3 capsules (225 mg) by mouth every morning 270 capsule 1     prednisoLONE acetate (PRED FORTE) 1 % ophthalmic suspension Apply 1 drop to eye 4 times daily To operative eye (Patient not taking: Reported on 3/3/2021) 10 mL 1       Allergies  Allergies   Allergen Reactions     Bactrim [Sulfamethoxazole W/Trimethoprim]      Internal bleeding     Copaxone [Glatiramer] Hives       Social History  Social History     Socioeconomic History     Marital status:      Spouse name: Kash     Number of children: 3     Years of education: Not on file     Highest education level: Not on file   Occupational History     Employer:  [employer]   Social Needs     Financial resource strain: Not on file     Food insecurity     Worry: Not on file     Inability: Not on file     Transportation needs     Medical: Not on file     Non-medical: Not on file   Tobacco Use     Smoking status: Former Smoker     Types: Cigarettes     Quit date: 2005     Years since quitting: 15.2     Smokeless tobacco: Never Used   Substance and Sexual Activity     Alcohol use: Not Currently     Alcohol/week: 2.0 standard drinks     Types: 1 Glasses of wine, 1 Cans of beer per week     Drug use: No     Sexual activity: Yes     Partners: Male     Birth control/protection: None     Comment: not needed after chemo   Lifestyle     Physical activity     Days per week: Not on file     Minutes per session: Not on file     Stress: Not on file   Relationships     Social connections     Talks on phone: Not on file     Gets together: Not on file     Attends Yarsanism service: Not on file     Active member of club or organization: Not on file     Attends meetings of clubs or organizations: Not on file     Relationship status: Not on file     Intimate partner violence     Fear of current or ex partner: Not on file     Emotionally abused: Not on file     Physically abused: Not on file     Forced sexual activity: Not on file   Other Topics Concern     Parent/sibling w/ CABG, MI or angioplasty before 65F 55M? Not Asked   Social History Narrative     Not on file       Family History  Family History   Problem Relation Age of Onset     Breast Cancer Maternal Aunt 50         at 85     Breast Cancer Cousin 40     Breast Cancer Mother 49        2nd primary, contralateral breast at 69;  at 86     Melanoma Mother      Breast Cancer Maternal Grandmother 40     Ovarian Cancer Maternal Grandmother      Breast Cancer Paternal Grandmother 50         at 60     Brain Cancer Cousin 20     Breast Cancer Paternal Aunt 50         at 60     Breast Cancer Cousin 45        paternal  cousin     Anesthesia Reaction No family hx of      Deep Vein Thrombosis No family hx of            Anesthesia Evaluation   Pt has had prior anesthetic.     No history of anesthetic complications       ROS/MED HX  ENT/Pulmonary:     (+) JUANY risk factors, hypertension, tobacco use, Past use,     Neurologic:     (+) peripheral neuropathy, - chemotherapy induced. migraines, Multiple Sclerosis, limitations: in remission right now.     Cardiovascular:     (+) hypertension-----Previous cardiac testing   Echo: Date: Results:    Stress Test: Date: Results:    ECG Reviewed: Date: 12/15/20 Results:  Sinus Tachycardia -Short CO syndrome   Aurelio = 104  BORDERLINE RHYTHM  Cath: Date: Results:      METS/Exercise Tolerance:     Hematologic:     (+) History of blood clots, pt is anticoagulated,  (-) history of blood transfusion   Musculoskeletal: Comment: Bone mets from breast cancer  H/o radiation to lumbar spine      GI/Hepatic:     (+) GERD,     Renal/Genitourinary:  - neg Renal ROS     Endo:  - neg endo ROS     Psychiatric/Substance Use:     (+) psychiatric history anxiety and depression     Infectious Disease:  - neg infectious disease ROS     Malignancy:   (+) Malignancy, History of Breast.Breast CA Active status post Surgery, Chemo and Radiation.        Other:          The complete review of systems is negative other than noted in the HPI or here.        Physical Exam    Please refer to the physical examination documented by the anesthesiologist in the anesthesia record on the day of surgery    Constitutional: Awake, alert, cooperative, no apparent distress, and appears stated age.  Respiratory: non labored breathing  Neurologic: Awake, alert, oriented to name, place and time.   Neuropsychiatric: Calm, cooperative. Normal affect.     PRIOR LABS/DIAGNOSTIC STUDIES:  All labs and imaging personally reviewed    Component      Latest Ref Rng & Units 3/3/2021   Sodium      133 - 144 mmol/L 135   Potassium      3.4 - 5.3 mmol/L 3.9    Chloride      94 - 109 mmol/L 103   Carbon Dioxide      20 - 32 mmol/L 29   Anion Gap      3 - 14 mmol/L 3   Glucose      70 - 99 mg/dL 95   Urea Nitrogen      7 - 30 mg/dL 13   Creatinine      0.52 - 1.04 mg/dL 0.72   GFR Estimate      >60 mL/min/1.73:m2 >90   GFR Estimate If Black      >60 mL/min/1.73:m2 >90   Calcium      8.5 - 10.1 mg/dL 8.3 (L)   Bilirubin Total      0.2 - 1.3 mg/dL 0.3   Albumin      3.4 - 5.0 g/dL 3.4   Protein Total      6.8 - 8.8 g/dL 6.4 (L)   Alkaline Phosphatase      40 - 150 U/L 60   ALT      0 - 50 U/L 28   AST      0 - 45 U/L 15   Magnesium      1.6 - 2.3 mg/dL 2.0   Phosphorus      2.5 - 4.5 mg/dL 3.7     Component      Latest Ref Rng & Units 3/3/2021   WBC      4.0 - 11.0 10e9/L 2.5 (L)   RBC Count      3.8 - 5.2 10e12/L 3.93   Hemoglobin      11.7 - 15.7 g/dL 11.0 (L)   Hematocrit      35.0 - 47.0 % 34.8 (L)   MCV      78 - 100 fl 89   MCH      26.5 - 33.0 pg 28.0   MCHC      31.5 - 36.5 g/dL 31.6   RDW      10.0 - 15.0 % 15.7 (H)   Platelet Count      150 - 450 10e9/L 181   Diff Method       Automated Method   % Neutrophils      % 55.4   % Lymphocytes      % 20.5   % Monocytes      % 19.7   % Eosinophils      % 2.4   % Basophils      % 0.8   % Immature Granulocytes      % 1.2   Absolute Neutrophil      1.6 - 8.3 10e9/L 1.4 (L)   Absolute Lymphocytes      0.8 - 5.3 10e9/L 0.5 (L)   Absolute Monocytes      0.0 - 1.3 10e9/L 0.5   Absolute Eosinophils      0.0 - 0.7 10e9/L 0.1   Absolute Basophils      0.0 - 0.2 10e9/L 0.0   Abs Immature Granulocytes      0 - 0.4 10e9/L 0.0     EKG 12/2020  Sinus  Tachycardia  -Short OK syndrome  Aurelio = 104  BORDERLINE RHYTHM     ECHO 2006  EF normal, no significant valve abnormalities        Outside records reviewed from: care everywhere         ASSESSMENT and PLAN  Shaneka Alvarez is a 60 year old female scheduled for PHACOEMULSIFICATION, CATARACT, WITH INTRAOCULAR LENS IMPLANT, TORIC LENS RIGHT on 3/8/21 by Dr. Barkley in treatment of Posterior  subcapsular polar age-related cataract of both eyes.  PAC referral for risk assessment and optimization for anesthesia with comorbid conditions of breast cancer, PE, MS, GERD, migraines, depression, anxiety:      Pre-operative considerations:   1.  Cardiac:  Functional status- METS >4. No reported cardiac history. denies cardiac symptoms. previous cardiac testing as above. low risk surgery with 0.4% (RCRI #) risk of major adverse cardiac event.   2.  Pulm: JUANY risk: low. non smoker. denies pulmonary symptoms.   3.  GI:  Risk of PONV score = 3.  If > 2, anti-emetic intervention recommended. GERD- intermittent and not using medication.   4.  Neuro: MS- stable. Denies recent symptoms and is not currently using medication.  Follows with Dr. Quiles.   --h/o migraine headaches that seem a bit worse lately which she attributes to straining her right eye.  Using Tylenol.  5.  Heme: h/o PE diagnosed about 3 months ago. Patient can continue Eliquis perioperatively per surgery team.   6.  Psych: anxiety and depression- stable. can continue medications.   7.  Onc: breast cancer in 2006 s/p chemotherapy, radiation and surgery. Now with recurrence and bone mets. Followed by Dr. Zepeda.  Underwent biopsy of the right acetabulum 2/25/2020, consistent with metastatic lobular carcinoma.  She continues with chemotherapy.   8.  Access: Difficult IV access. patient has left chest port in place.      VTE risk: 1.8%     Patient is optimized and is acceptable candidate for the proposed procedure.  No further diagnostic evaluation is needed.       Idalia So PA-C  Preoperative Assessment Center  [facility]  Phone: [phone]  Fax: [phone]

## 2021-03-04 NOTE — TELEPHONE ENCOUNTER
FUTURE VISIT INFORMATION      SURGERY INFORMATION:    Date: 3.8.21    [place] [facility]    Surgeon:  Filippo    Anesthesia Type:  combined MAC with topical    Procedure: PHACOEMULSIFICATION, CATARACT, WITH INTRAOCULAR LENS IMPLANT, TORIC LENS RIGHT    Consult: 21    RECORDS REQUESTED FROM:         Most recent EKG+ Tracin.15.20    Most recent ECHO: 06

## 2021-03-04 NOTE — ANESTHESIA PREPROCEDURE EVALUATION
Anesthesia Pre-Procedure Evaluation    Patient: Shaneka Alvarez   MRN: [id number] : 1960        Preoperative Diagnosis: * No surgery found *   Procedure :      Past Medical History:   Diagnosis Date     Breast cancer (H)     Age 44     Cataract     left eye     Chemotherapy induced nausea and vomiting 12/15/2020     Common migraine      Esophageal reflux      H/O bilateral mastectomy      Mild major depression (H)      Multiple sclerosis (H)      Pulmonary embolism (H)       Past Surgical History:   Procedure Laterality Date     CHOLECYSTECTOMY       ENDOBRONCHIAL ULTRASOUND FLEXIBLE N/A 2019    Procedure: Flexible Bronchoscopy, Endobronchial Ultrasound, Radial Probe;  Surgeon: Sree Sanchez MD;  Location: [facility] OR     HC REMOVAL OF OVARY/TUBE(S)       HERNIA REPAIR, UMBILICAL       mastectomy  Bilateral 2006     MASTECTOMY, BILATERAL       PHACOEMULSIFICATION CLEAR CORNEA WITH TORIC INTRAOCULAR LENS IMPLANT Left 2021    Procedure: PHACOEMULSIFICATION, COMPLEX CATARACT, WITH INTRAOCULAR LENS IMPLANT, RESIDENT TORIC LENS LEFT;  Surgeon: Luis Barkley MD;  Location: [facility] OR      Allergies   Allergen Reactions     Bactrim [Sulfamethoxazole W/Trimethoprim]      Internal bleeding     Copaxone [Glatiramer] Hives      Social History     Tobacco Use     Smoking status: Former Smoker     Types: Cigarettes     Quit date: 2005     Years since quitting: 15.2     Smokeless tobacco: Never Used   Substance Use Topics     Alcohol use: Not Currently     Alcohol/week: 2.0 standard drinks     Types: 1 Glasses of wine, 1 Cans of beer per week      Wt Readings from Last 1 Encounters:   21 60.9 kg (134 lb 4.8 oz)        Anesthesia Evaluation   Pt has had prior anesthetic.     No history of anesthetic complications       ROS/MED HX  ENT/Pulmonary:     (+) JUANY risk factors, hypertension, tobacco use, Past use,     Neurologic:     (+) peripheral neuropathy, - chemotherapy induced. migraines,  Multiple Sclerosis, limitations: in remission right now.     Cardiovascular:     (+) hypertension-----Previous cardiac testing   Echo: Date: Results:    Stress Test: Date: Results:    ECG Reviewed: Date: 12/15/20 Results:  Sinus Tachycardia -Short LA syndrome   Aurelio = 104  BORDERLINE RHYTHM  Cath: Date: Results:      METS/Exercise Tolerance:     Hematologic:     (+) History of blood clots, pt is anticoagulated,  (-) history of blood transfusion   Musculoskeletal: Comment: Bone mets from breast cancer  H/o radiation to lumbar spine      GI/Hepatic:     (+) GERD,     Renal/Genitourinary:  - neg Renal ROS     Endo:  - neg endo ROS     Psychiatric/Substance Use:     (+) psychiatric history anxiety and depression     Infectious Disease:  - neg infectious disease ROS     Malignancy:   (+) Malignancy, History of Breast.Breast CA Active status post Surgery, Chemo and Radiation.        Other:               OUTSIDE LABS:  CBC:   Lab Results   Component Value Date    WBC 2.5 (L) 03/03/2021    WBC 3.6 (L) 02/17/2021    HGB 11.0 (L) 03/03/2021    HGB 11.1 (L) 02/17/2021    HCT 34.8 (L) 03/03/2021    HCT 35.0 02/17/2021     03/03/2021     02/17/2021     BMP:   Lab Results   Component Value Date     03/03/2021     02/17/2021    POTASSIUM 3.9 03/03/2021    POTASSIUM 4.6 02/17/2021    CHLORIDE 103 03/03/2021    CHLORIDE 105 02/17/2021    CO2 29 03/03/2021    CO2 32 02/17/2021    BUN 13 03/03/2021    BUN 8 02/17/2021    CR 0.72 03/03/2021    CR 0.63 02/17/2021    GLC 95 03/03/2021    GLC 93 02/17/2021     COAGS:   Lab Results   Component Value Date    INR 1.05 09/24/2020     POC:   Lab Results   Component Value Date    BGM 89 09/05/2019     HEPATIC:   Lab Results   Component Value Date    ALBUMIN 3.4 03/03/2021    PROTTOTAL 6.4 (L) 03/03/2021    ALT 28 03/03/2021    AST 15 03/03/2021    ALKPHOS 60 03/03/2021    BILITOTAL 0.3 03/03/2021     OTHER:   Lab Results   Component Value Date    LACT 1.4 12/15/2020     A1C 5.4 12/29/2016    RAFAELA 8.3 (L) 03/03/2021    PHOS 3.7 03/03/2021    MAG 2.0 03/03/2021    LIPASE 46 (L) 09/24/2020    TSH 1.11 12/08/2014    CRP 5.3 12/15/2020       Anesthesia Plan    ASA Status:  3      Anesthesia Type: MAC.   Induction: Intravenous.   Maintenance: TIVA.        Consents    Anesthesia Plan(s) and associated risks, benefits, and realistic alternatives discussed. Questions answered and patient/representative(s) expressed understanding.     - Discussed with:  Patient         Postoperative Care       PONV prophylaxis: Ondansetron (or other 5HT-3), Background Propofol Infusion     Comments:              PAC Discussion and Assessment    ASA Classification: 3  Case is suitable for: ASC  Anesthetic techniques and relevant risks discussed: MAC with GA as backup  Invasive monitoring and risk discussed: No    Possibility and Risk of blood transfusion discussed: No            PAC Resident/NP Anesthesia Assessment: Shaneka Alvarez is a 60 year old female scheduled for PHACOEMULSIFICATION, CATARACT, WITH INTRAOCULAR LENS IMPLANT, TORIC LENS RIGHT on 3/8/21 by Dr. Barkley in treatment of Posterior subcapsular polar age-related cataract of both eyes.  PAC referral for risk assessment and optimization for anesthesia with comorbid conditions of breast cancer, PE, MS, GERD, migraines, depression, anxiety:      Pre-operative considerations:   1.  Cardiac:  Functional status- METS >4. No reported cardiac history. denies cardiac symptoms. previous cardiac testing as above. low risk surgery with 0.4% (RCRI #) risk of major adverse cardiac event.   2.  Pulm: JUANY risk: low. non smoker. denies pulmonary symptoms.   3.  GI:  Risk of PONV score = 3.  If > 2, anti-emetic intervention recommended. GERD- intermittent and not using medication.   4.  Neuro: MS- stable. Denies recent symptoms and is not currently using medication.  Follows with Dr. Quiles.   --h/o migraine headaches that seem a bit worse lately which she  attributes to straining her right eye.  Using Tylenol.  5.  Heme: h/o PE diagnosed about 3 months ago. Patient can continue Eliquis perioperatively per surgery team.   6.  Psych: anxiety and depression- stable. can continue medications.   7.  Onc: breast cancer in 2006 s/p chemotherapy, radiation and surgery. Now with recurrence and bone mets. Followed by Dr. Zepeda.  Underwent biopsy of the right acetabulum 2/25/2020, consistent with metastatic lobular carcinoma.  She continues with chemotherapy.   8.  Access: Difficult IV access. patient has left chest port in place.      VTE risk: 1.8%     Patient is optimized and is acceptable candidate for the proposed procedure.  No further diagnostic evaluation is needed.                                        Idalia So PA-C

## 2021-03-08 ENCOUNTER — HOSPITAL ENCOUNTER (OUTPATIENT)
Facility: AMBULATORY SURGERY CENTER | Age: 61
Discharge: HOME | End: 2021-03-08
Attending: OPHTHALMOLOGY | Admitting: OPHTHALMOLOGY
Payer: COMMERCIAL

## 2021-03-08 ENCOUNTER — OFFICE VISIT (OUTPATIENT)
Dept: OPHTHALMOLOGY | Facility: CLINIC | Age: 61
End: 2021-03-08
Payer: COMMERCIAL

## 2021-03-08 ENCOUNTER — ANESTHESIA (OUTPATIENT)
Dept: SURGERY | Facility: AMBULATORY SURGERY CENTER | Age: 61
End: 2021-03-08

## 2021-03-08 VITALS
WEIGHT: 130 LBS | TEMPERATURE: 97.7 F | DIASTOLIC BLOOD PRESSURE: 90 MMHG | HEART RATE: 69 BPM | RESPIRATION RATE: 16 BRPM | HEIGHT: 62 IN | BODY MASS INDEX: 23.92 KG/M2 | SYSTOLIC BLOOD PRESSURE: 129 MMHG | OXYGEN SATURATION: 100 %

## 2021-03-08 DIAGNOSIS — H25.043 POSTERIOR SUBCAPSULAR POLAR AGE-RELATED CATARACT OF BOTH EYES: Primary | ICD-10-CM

## 2021-03-08 DIAGNOSIS — Z96.1 PSEUDOPHAKIA OF BOTH EYES: ICD-10-CM

## 2021-03-08 DIAGNOSIS — Z98.890 POSTOPERATIVE EYE STATE: Primary | ICD-10-CM

## 2021-03-08 PROCEDURE — 66984 XCAPSL CTRC RMVL W/O ECP: CPT | Mod: RT

## 2021-03-08 PROCEDURE — V2599 CONTACT LENS/ES OTHER TYPE: HCPCS | Mod: RT

## 2021-03-08 PROCEDURE — 99207 PR SERVICE NOT STAFFED W/SUPERV PROV: CPT | Mod: GC | Performed by: OPHTHALMOLOGY

## 2021-03-08 DEVICE — IMPLANTABLE DEVICE: Type: IMPLANTABLE DEVICE | Site: EYE | Status: FUNCTIONAL

## 2021-03-08 RX ORDER — LIDOCAINE 40 MG/G
CREAM TOPICAL
Status: DISCONTINUED | OUTPATIENT
Start: 2021-03-08 | End: 2021-03-08 | Stop reason: HOSPADM

## 2021-03-08 RX ORDER — NALOXONE HYDROCHLORIDE 0.4 MG/ML
0.2 INJECTION, SOLUTION INTRAMUSCULAR; INTRAVENOUS; SUBCUTANEOUS
Status: DISCONTINUED | OUTPATIENT
Start: 2021-03-08 | End: 2021-03-09 | Stop reason: HOSPADM

## 2021-03-08 RX ORDER — CYCLOPENTOLAT/TROPIC/PHENYLEPH 1%-1%-2.5%
1 DROPS (EA) OPHTHALMIC (EYE)
Status: COMPLETED | OUTPATIENT
Start: 2021-03-08 | End: 2021-03-08

## 2021-03-08 RX ORDER — PROPARACAINE HYDROCHLORIDE 5 MG/ML
1 SOLUTION/ DROPS OPHTHALMIC ONCE
Status: COMPLETED | OUTPATIENT
Start: 2021-03-08 | End: 2021-03-08

## 2021-03-08 RX ORDER — TETRACAINE HYDROCHLORIDE 5 MG/ML
SOLUTION OPHTHALMIC PRN
Status: DISCONTINUED | OUTPATIENT
Start: 2021-03-08 | End: 2021-03-08 | Stop reason: HOSPADM

## 2021-03-08 RX ORDER — SODIUM CHLORIDE, SODIUM LACTATE, POTASSIUM CHLORIDE, CALCIUM CHLORIDE 600; 310; 30; 20 MG/100ML; MG/100ML; MG/100ML; MG/100ML
500 INJECTION, SOLUTION INTRAVENOUS CONTINUOUS
Status: DISCONTINUED | OUTPATIENT
Start: 2021-03-08 | End: 2021-03-08 | Stop reason: HOSPADM

## 2021-03-08 RX ORDER — ACETAMINOPHEN 325 MG/1
975 TABLET ORAL ONCE
Status: COMPLETED | OUTPATIENT
Start: 2021-03-08 | End: 2021-03-08

## 2021-03-08 RX ORDER — LIDOCAINE 40 MG/G
CREAM TOPICAL
Status: DISCONTINUED | OUTPATIENT
Start: 2021-03-08 | End: 2021-03-09 | Stop reason: HOSPADM

## 2021-03-08 RX ORDER — PREDNISOLONE ACETATE 10 MG/ML
1 SUSPENSION/ DROPS OPHTHALMIC 4 TIMES DAILY
Qty: 10 ML | Refills: 1 | Status: SHIPPED | OUTPATIENT
Start: 2021-03-08 | End: 2021-04-14

## 2021-03-08 RX ORDER — MOXIFLOXACIN IN NACL,ISO-OS/PF 0.3MG/0.3
SYRINGE (ML) INTRAOCULAR PRN
Status: DISCONTINUED | OUTPATIENT
Start: 2021-03-08 | End: 2021-03-08 | Stop reason: HOSPADM

## 2021-03-08 RX ORDER — HEPARIN SODIUM (PORCINE) LOCK FLUSH IV SOLN 100 UNIT/ML 100 UNIT/ML
5 SOLUTION INTRAVENOUS
Status: DISCONTINUED | OUTPATIENT
Start: 2021-03-08 | End: 2021-03-09 | Stop reason: HOSPADM

## 2021-03-08 RX ORDER — FENTANYL CITRATE 50 UG/ML
INJECTION, SOLUTION INTRAMUSCULAR; INTRAVENOUS PRN
Status: DISCONTINUED | OUTPATIENT
Start: 2021-03-08 | End: 2021-03-08

## 2021-03-08 RX ORDER — ONDANSETRON 2 MG/ML
4 INJECTION INTRAMUSCULAR; INTRAVENOUS EVERY 30 MIN PRN
Status: DISCONTINUED | OUTPATIENT
Start: 2021-03-08 | End: 2021-03-09 | Stop reason: HOSPADM

## 2021-03-08 RX ORDER — LIDOCAINE HYDROCHLORIDE 10 MG/ML
INJECTION, SOLUTION EPIDURAL; INFILTRATION; INTRACAUDAL; PERINEURAL PRN
Status: DISCONTINUED | OUTPATIENT
Start: 2021-03-08 | End: 2021-03-08 | Stop reason: HOSPADM

## 2021-03-08 RX ORDER — HEPARIN SODIUM,PORCINE 10 UNIT/ML
5-10 VIAL (ML) INTRAVENOUS EVERY 24 HOURS
Status: DISCONTINUED | OUTPATIENT
Start: 2021-03-08 | End: 2021-03-09 | Stop reason: HOSPADM

## 2021-03-08 RX ORDER — NALOXONE HYDROCHLORIDE 0.4 MG/ML
0.4 INJECTION, SOLUTION INTRAMUSCULAR; INTRAVENOUS; SUBCUTANEOUS
Status: DISCONTINUED | OUTPATIENT
Start: 2021-03-08 | End: 2021-03-09 | Stop reason: HOSPADM

## 2021-03-08 RX ORDER — HEPARIN SODIUM,PORCINE 10 UNIT/ML
5-10 VIAL (ML) INTRAVENOUS
Status: DISCONTINUED | OUTPATIENT
Start: 2021-03-08 | End: 2021-03-09 | Stop reason: HOSPADM

## 2021-03-08 RX ORDER — TIMOLOL 5 MG/ML
SOLUTION/ DROPS OPHTHALMIC PRN
Status: DISCONTINUED | OUTPATIENT
Start: 2021-03-08 | End: 2021-03-08 | Stop reason: HOSPADM

## 2021-03-08 RX ORDER — SODIUM CHLORIDE, SODIUM LACTATE, POTASSIUM CHLORIDE, CALCIUM CHLORIDE 600; 310; 30; 20 MG/100ML; MG/100ML; MG/100ML; MG/100ML
INJECTION, SOLUTION INTRAVENOUS CONTINUOUS
Status: DISCONTINUED | OUTPATIENT
Start: 2021-03-08 | End: 2021-03-09 | Stop reason: HOSPADM

## 2021-03-08 RX ORDER — MOXIFLOXACIN 5 MG/ML
1 SOLUTION/ DROPS OPHTHALMIC 3 TIMES DAILY
Qty: 3 ML | Refills: 1 | Status: SHIPPED | OUTPATIENT
Start: 2021-03-08 | End: 2021-04-14

## 2021-03-08 RX ORDER — ONDANSETRON 4 MG/1
4 TABLET, ORALLY DISINTEGRATING ORAL EVERY 30 MIN PRN
Status: DISCONTINUED | OUTPATIENT
Start: 2021-03-08 | End: 2021-03-09 | Stop reason: HOSPADM

## 2021-03-08 RX ORDER — BALANCED SALT SOLUTION 6.4; .75; .48; .3; 3.9; 1.7 MG/ML; MG/ML; MG/ML; MG/ML; MG/ML; MG/ML
SOLUTION OPHTHALMIC PRN
Status: DISCONTINUED | OUTPATIENT
Start: 2021-03-08 | End: 2021-03-08 | Stop reason: HOSPADM

## 2021-03-08 RX ORDER — MEPERIDINE HYDROCHLORIDE 25 MG/ML
12.5 INJECTION INTRAMUSCULAR; INTRAVENOUS; SUBCUTANEOUS
Status: DISCONTINUED | OUTPATIENT
Start: 2021-03-08 | End: 2021-03-09 | Stop reason: HOSPADM

## 2021-03-08 RX ORDER — ALBUTEROL SULFATE 0.83 MG/ML
2.5 SOLUTION RESPIRATORY (INHALATION) EVERY 4 HOURS PRN
Status: DISCONTINUED | OUTPATIENT
Start: 2021-03-08 | End: 2021-03-09 | Stop reason: HOSPADM

## 2021-03-08 RX ADMIN — HEPARIN SODIUM (PORCINE) LOCK FLUSH IV SOLN 100 UNIT/ML 5 ML: 100 SOLUTION at 10:44

## 2021-03-08 RX ADMIN — ACETAMINOPHEN 975 MG: 325 TABLET ORAL at 09:23

## 2021-03-08 RX ADMIN — SODIUM CHLORIDE, SODIUM LACTATE, POTASSIUM CHLORIDE, CALCIUM CHLORIDE: 600; 310; 30; 20 INJECTION, SOLUTION INTRAVENOUS at 09:36

## 2021-03-08 RX ADMIN — Medication 1 DROP: at 09:06

## 2021-03-08 RX ADMIN — Medication 1 DROP: at 09:23

## 2021-03-08 RX ADMIN — Medication 1 DROP: at 09:12

## 2021-03-08 RX ADMIN — PROPARACAINE HYDROCHLORIDE 1 DROP: 5 SOLUTION/ DROPS OPHTHALMIC at 09:06

## 2021-03-08 RX ADMIN — FENTANYL CITRATE 50 MCG: 50 INJECTION, SOLUTION INTRAMUSCULAR; INTRAVENOUS at 09:39

## 2021-03-08 ASSESSMENT — EXTERNAL EXAM - RIGHT EYE: OD_EXAM: NORMAL

## 2021-03-08 ASSESSMENT — VISUAL ACUITY
OD_SC: 20/25-2
METHOD: SNELLEN - LINEAR

## 2021-03-08 ASSESSMENT — TONOMETRY
OD_IOP_MMHG: 11
IOP_METHOD: TONOPEN

## 2021-03-08 ASSESSMENT — MIFFLIN-ST. JEOR: SCORE: 1122.93

## 2021-03-08 ASSESSMENT — SLIT LAMP EXAM - LIDS: COMMENTS: NORMAL

## 2021-03-08 NOTE — OP NOTE
PREOPERATIVE DIAGNOSIS:   1. Posterior subcapsular polar age-related cataract right eye       POSTOPERATIVE DIAGNOSIS: Same   PROCEDURES:   1. Cataract extraction with toric intraocular lens implant Right eye.  2. Synechiolysis of adhesions, Right eye  SURGEON: Luis Barkley M.D.  ASSISTANT: Fadi Gifford M.D.  INDICATIONS: The patient Shaneka Alvarez presented to the eye clinic with decreased vision secondary to cataract in the Right eye. The risks, benefits and alternatives to cataract extraction were discussed. The patient elected to proceed. All questions were answered to the patient's satisfaction.   DESCRIPTION OF PROCEDURE:   Prior to the procedure, appropriate cardiac and respiratory monitors were applied to the patient.  In the pre-operative holding area, a drop of topical tetracaine followed by lidocaine gel followed by povidone iodine.  The patient was brought to the operating room where a surgical pause was carried out to identify with all members of the surgical team the correct surgical site.  With adequate anesthesia, the Right eye was prepped and draped in the usual sterile fashion. A lid speculum was placed, and the operating microscope was rotated into position. A paracentesis was created.  Through this limbal paracentesis, the anterior chamber was filled with preservative-free lidocaine followed by viscoelastic. The viscoelastic cannula was then used to sweep and break synechial adhesions between the inferior iris and anterior lens capsule. A temporal wound was created at the limbus using a 2.6 mm blade. A capsulorrhexis was initiated using a bent 25-gauge needle and was completed in continuous and circular fashion using the capsulorrhexis forceps. The lens nucleus was hydrodissected using balanced salt solution.  The lens nucleus was rotated and removed using phacoemulsification in a stop and chop technique.  Residual cortical material was removed using irrigation-aspiration.  The capsular  bag was reinflated to its maximal extent with cohesive viscoelastic.  A 20.5 diopter [id number] inserted into the capsular bag and rotated to the appropriate axis.  The lens power selected was reviewed using the intraocular lens power measurements that were obtained preoperatively to confirm that the correct lens was selected for the desired post-operative refractive state. The residual viscoelastic was removed in its entirety, the wound were hydrated and found to be self-sealing.  Intracameral moxifloxacin was administered. Subconjunctival Dexamethasone was then administered inferiorly. Tactile pressure was confirmed to be in a normal range.  The lid speculum was removed and  shield was applied.  The patient tolerated the procedure well, and there were no complications.    PLAN: The patient will be discharged to home and will follow up tomorrow morning in the eye clinic.  EBL:  None  Complications:  None  Implant Name Type Inv. Item Serial No.  Lot No. LRB No. Used Action   EYE IMP IOL TECNIS TORIC II 1-PC 20.5D CYL3.00 [id number] Lens/Eye Implant EYE IMP IOL TECNIS TORIC II 1-PC 20.5D CYL3.00 [id number]   Right 1 Implanted       Attending Physician Procedure Attestation: I was present for the entire procedure       Luis Barkley MD  , Comprehensive Ophthalmology  Department of Ophthalmology and Visual Neurosciences  [facility]

## 2021-03-08 NOTE — DISCHARGE INSTRUCTIONS
[facility]  Home Care Following Anesthesia  For 24 hours after surgery:  1. Get plenty of rest.  A responsible adult must stay with you for at least 24 hours after you leave the surgery center.  2. Do not drive or use heavy equipment.  If you have weakness or tingling, don't drive or use heavy equipment until this feeling goes away.   3. Do not drink alcohol.   4. Avoid strenuous or risky activities.  Ask for help when climbing stairs.  5. You may feel lightheaded.  IF so, sit for a few minutes before standing.  Have someone help you get up.   6. If you have nausea (feel sick to your stomach): Drink only clear liquids such as apple juice, ginger ale, broth or 7-Up.  Rest may also help.  Be sure to drink enough fluids.  Move to a regular diet as you feel able.   7. You may have a slight fever.  Call the doctor if your fever is over 100 F (37.7 C) (taken under the tongue) or lasts longer than 24 hours.  8. You may have a dry mouth, a sore throat, muscle aches or trouble sleeping. These should go away after 24 hours.  9. Do not make important or legal decisions.               Tips for taking pain medications  To get the best pain relief possible, remember these points:    Take pain medications as directed, before pain becomes severe.    Pain medication can upset your stomach: taking it with food may help.    Constipation is a common side effect of pain medication. Drink plenty of  fluids.    Eat foods high in fiber. Take a stool softener if recommended by your doctor or pharmacist.    Do not drink alcohol, drive or operate machinery while taking pain medications.    Ask about other ways to control pain, such as with heat, ice or relaxation.    Tylenol/Acetaminophen Consumption - You received 975 mg at 9:30 am.  Your next available dose is at 3:30 pm  To help encourage the safe use of acetaminophen, the makers of TYLENOL  have lowered the maximum daily dose for single-ingredient  Extra Strength TYLENOL  (acetaminophen) products sold in the [place] from 8 pills per day (4,000 mg) to 6 pills per day (3,000 mg). The dosing interval has also changed from 2 pills every 4-6 hours to 2 pills every 6 hours.    If you feel your pain relief is insufficient, you may take Tylenol/Acetaminophen in addition to your narcotic pain medication.     Be careful not to exceed 3,000 mg of Tylenol/Acetaminophen in a 24 hour period from all sources.    If you are taking extra strength Tylenol/acetaminophen (500 mg), the maximum dose is 6 tablets in 24 hours.    If you are taking regular strength acetaminophen (325 mg), the maximum dose is 9 tablets in 24 hours.    Call a doctor for any of the followin. Signs of infection (fever, growing tenderness at the surgery site, a large amount of drainage or bleeding, severe pain, foul-smelling drainage, redness, swelling).  2. It has been over 8 to 10 hours since surgery and you are still not able to urinate (pass water).  3. Headache for over 24 hours.  4. Signs of Covid-19 infection (temperature over 100 degrees, shortness of breath, cough, loss of taste/smell, generalized body aches, persistent headache, chills, sore throat, nausea/vomiting/diarrhea)    Your doctor is:       Dr. Luis Barkley, Ophthalmology: [phone]               After Hours and Weekends dial [phone] and ask for the resident on call for:  Ophthalmology  For emergency care, call the:  [facility]:  [phone] (TTY for hearing impaired: [phone])

## 2021-03-08 NOTE — ANESTHESIA CARE TRANSFER NOTE
Patient: Shaneka Alvarez    Procedure(s):  PHACOEMULSIFICATION, CATARACT, WITH INTRAOCULAR LENS IMPLANT, TORIC LENS RIGHT    Diagnosis: Posterior subcapsular polar age-related cataract of both eyes [H25.043]  Diagnosis Additional Information: No value filed.    Anesthesia Type:   MAC     Note:      Level of Consciousness: awake  Oxygen Supplementation: room air    Independent Airway: airway patency satisfactory and stable  Dentition: dentition unchanged  Vital Signs Stable: post-procedure vital signs reviewed and stable  Report to RN Given: handoff report given  Patient transferred to: Phase II    Handoff Report: Identifed the Patient, Identified the Reponsible Provider, Reviewed the pertinent medical history, Discussed the surgical course, Reviewed Intra-OP anesthesia mangement and issues during anesthesia, Set expectations for post-procedure period and Allowed opportunity for questions and acknowledgement of understanding      Vitals: (Last set prior to Anesthesia Care Transfer)  CRNA VITALS  3/8/2021 0941 - 3/8/2021 1012      3/8/2021             Pulse:  75    SpO2:  99 %    Resp Rate (set):  10        Electronically Signed By: GUTIERREZ Schaefer CRNA  March 8, 2021  10:12 AM

## 2021-03-08 NOTE — PROGRESS NOTES
POD#0, status post cataract surgery, RIGHT eye    No complaints.  Denies eye pain.    Va sc 20/25-1    IOP 11 mm Hg by applanation    Impression/Plan:  Pseudophakia, OD: POD0, good post-operative appearance. IOP reasonable.    Start Pred Forte 4/3/2/1 weekly taper, then stop  Start Vigamox TID x7 days, then stop    Eye protection at all times and eye shield at night for 1 week.    Limited activities with no exercise or heavy lifting for 1 week.    Instructed patient to contact us for decreasing vision, eye pain, new floaters or flashes of light or other concerning symptoms.    Written instructions given    Return to clinic at 10:30 AM on 3/25/2021, as scheduled, with Dr. Barkley.      Ramon Abarca MD  Ophthalmology PGY-3  [facility]     Not seen by staff during this visit, available should need have arisen.  Plan appropriate as above.    Luis Barkley MD  , Comprehensive Ophthalmology  Department of Ophthalmology and Visual Neurosciences  [facility]

## 2021-03-10 ENCOUNTER — INFUSION THERAPY VISIT (OUTPATIENT)
Dept: INFUSION THERAPY | Facility: CLINIC | Age: 61
End: 2021-03-10
Attending: INTERNAL MEDICINE
Payer: COMMERCIAL

## 2021-03-10 VITALS
WEIGHT: 138.9 LBS | TEMPERATURE: 98.2 F | BODY MASS INDEX: 25.41 KG/M2 | DIASTOLIC BLOOD PRESSURE: 71 MMHG | RESPIRATION RATE: 14 BRPM | OXYGEN SATURATION: 100 % | HEART RATE: 69 BPM | SYSTOLIC BLOOD PRESSURE: 110 MMHG

## 2021-03-10 DIAGNOSIS — C50.919 METASTATIC BREAST CANCER: ICD-10-CM

## 2021-03-10 DIAGNOSIS — C50.911 BILATERAL MALIGNANT NEOPLASM OF BREAST IN FEMALE, UNSPECIFIED ESTROGEN RECEPTOR STATUS, UNSPECIFIED SITE OF BREAST (H): ICD-10-CM

## 2021-03-10 DIAGNOSIS — D70.1 CHEMOTHERAPY-INDUCED NEUTROPENIA (H): ICD-10-CM

## 2021-03-10 DIAGNOSIS — C50.912 BILATERAL MALIGNANT NEOPLASM OF BREAST IN FEMALE, UNSPECIFIED ESTROGEN RECEPTOR STATUS, UNSPECIFIED SITE OF BREAST (H): ICD-10-CM

## 2021-03-10 DIAGNOSIS — T45.1X5A CHEMOTHERAPY-INDUCED NEUTROPENIA (H): ICD-10-CM

## 2021-03-10 DIAGNOSIS — E87.6 HYPOKALEMIA: Primary | ICD-10-CM

## 2021-03-10 LAB
ALBUMIN SERPL-MCNC: 3.3 G/DL (ref 3.4–5)
ALP SERPL-CCNC: 65 U/L (ref 40–150)
ALT SERPL W P-5'-P-CCNC: 28 U/L (ref 0–50)
ANION GAP SERPL CALCULATED.3IONS-SCNC: 5 MMOL/L (ref 3–14)
AST SERPL W P-5'-P-CCNC: 17 U/L (ref 0–45)
BASOPHILS # BLD AUTO: 0 10E9/L (ref 0–0.2)
BASOPHILS NFR BLD AUTO: 0.9 %
BILIRUB SERPL-MCNC: 0.2 MG/DL (ref 0.2–1.3)
BUN SERPL-MCNC: 14 MG/DL (ref 7–30)
CALCIUM SERPL-MCNC: 8.7 MG/DL (ref 8.5–10.1)
CHLORIDE SERPL-SCNC: 106 MMOL/L (ref 94–109)
CO2 SERPL-SCNC: 29 MMOL/L (ref 20–32)
CREAT SERPL-MCNC: 0.55 MG/DL (ref 0.52–1.04)
DIFFERENTIAL METHOD BLD: ABNORMAL
EOSINOPHIL # BLD AUTO: 0.1 10E9/L (ref 0–0.7)
EOSINOPHIL NFR BLD AUTO: 3.2 %
ERYTHROCYTE [DISTWIDTH] IN BLOOD BY AUTOMATED COUNT: 16.4 % (ref 10–15)
GFR SERPL CREATININE-BSD FRML MDRD: >90 ML/MIN/{1.73_M2}
GLUCOSE SERPL-MCNC: 109 MG/DL (ref 70–99)
HCT VFR BLD AUTO: 34.7 % (ref 35–47)
HGB BLD-MCNC: 11.2 G/DL (ref 11.7–15.7)
IMM GRANULOCYTES # BLD: 0.1 10E9/L (ref 0–0.4)
IMM GRANULOCYTES NFR BLD: 3.2 %
LYMPHOCYTES # BLD AUTO: 0.6 10E9/L (ref 0.8–5.3)
LYMPHOCYTES NFR BLD AUTO: 18.2 %
MAGNESIUM SERPL-MCNC: 1.9 MG/DL (ref 1.6–2.3)
MCH RBC QN AUTO: 29.1 PG (ref 26.5–33)
MCHC RBC AUTO-ENTMCNC: 32.3 G/DL (ref 31.5–36.5)
MCV RBC AUTO: 90 FL (ref 78–100)
MONOCYTES # BLD AUTO: 0.5 10E9/L (ref 0–1.3)
MONOCYTES NFR BLD AUTO: 13.8 %
NEUTROPHILS # BLD AUTO: 2.1 10E9/L (ref 1.6–8.3)
NEUTROPHILS NFR BLD AUTO: 60.7 %
PHOSPHATE SERPL-MCNC: 4.2 MG/DL (ref 2.5–4.5)
PLATELET # BLD AUTO: 194 10E9/L (ref 150–450)
POTASSIUM SERPL-SCNC: 4 MMOL/L (ref 3.4–5.3)
PROT SERPL-MCNC: 6.1 G/DL (ref 6.8–8.8)
RBC # BLD AUTO: 3.85 10E12/L (ref 3.8–5.2)
SODIUM SERPL-SCNC: 140 MMOL/L (ref 133–144)
WBC # BLD AUTO: 3.4 10E9/L (ref 4–11)

## 2021-03-10 PROCEDURE — 99207 PR NO CHARGE NURSE ONLY: CPT

## 2021-03-10 PROCEDURE — 84100 ASSAY OF PHOSPHORUS: CPT | Performed by: INTERNAL MEDICINE

## 2021-03-10 PROCEDURE — 80053 COMPREHEN METABOLIC PANEL: CPT | Performed by: INTERNAL MEDICINE

## 2021-03-10 PROCEDURE — 85025 COMPLETE CBC W/AUTO DIFF WBC: CPT | Performed by: INTERNAL MEDICINE

## 2021-03-10 PROCEDURE — 83735 ASSAY OF MAGNESIUM: CPT | Performed by: INTERNAL MEDICINE

## 2021-03-10 PROCEDURE — 96413 CHEMO IV INFUSION 1 HR: CPT | Performed by: NURSE PRACTITIONER

## 2021-03-10 PROCEDURE — S0028 INJECTION, FAMOTIDINE, 20 MG: HCPCS | Performed by: NURSE PRACTITIONER

## 2021-03-10 PROCEDURE — 96367 TX/PROPH/DG ADDL SEQ IV INF: CPT | Performed by: NURSE PRACTITIONER

## 2021-03-10 PROCEDURE — 99207 PR NO CHARGE LOS: CPT

## 2021-03-10 PROCEDURE — 96375 TX/PRO/DX INJ NEW DRUG ADDON: CPT | Performed by: NURSE PRACTITIONER

## 2021-03-10 RX ORDER — DIPHENHYDRAMINE HCL 25 MG
50 CAPSULE ORAL ONCE
Status: COMPLETED | OUTPATIENT
Start: 2021-03-10 | End: 2021-03-10

## 2021-03-10 RX ORDER — HEPARIN SODIUM (PORCINE) LOCK FLUSH IV SOLN 100 UNIT/ML 100 UNIT/ML
5 SOLUTION INTRAVENOUS
Status: DISCONTINUED | OUTPATIENT
Start: 2021-03-10 | End: 2021-03-10 | Stop reason: HOSPADM

## 2021-03-10 RX ORDER — ACETAMINOPHEN 325 MG/1
650 TABLET ORAL ONCE
Status: COMPLETED | OUTPATIENT
Start: 2021-03-10 | End: 2021-03-10

## 2021-03-10 RX ORDER — HEPARIN SODIUM (PORCINE) LOCK FLUSH IV SOLN 100 UNIT/ML 100 UNIT/ML
500 SOLUTION INTRAVENOUS ONCE
Status: COMPLETED | OUTPATIENT
Start: 2021-03-10 | End: 2021-03-10

## 2021-03-10 RX ADMIN — Medication 50 MG: at 11:47

## 2021-03-10 RX ADMIN — Medication 500 ML: at 11:53

## 2021-03-10 RX ADMIN — HEPARIN SODIUM (PORCINE) LOCK FLUSH IV SOLN 100 UNIT/ML 500 UNITS: 100 SOLUTION at 11:16

## 2021-03-10 RX ADMIN — ACETAMINOPHEN 650 MG: 325 TABLET ORAL at 11:47

## 2021-03-10 RX ADMIN — HEPARIN SODIUM (PORCINE) LOCK FLUSH IV SOLN 100 UNIT/ML 5 ML: 100 SOLUTION at 14:44

## 2021-03-10 ASSESSMENT — PAIN SCALES - GENERAL: PAINLEVEL: NO PAIN (0)

## 2021-03-10 NOTE — PROGRESS NOTES
Infusion Nursing Note:  Shaneka Alvarez presents today for C6D8 Trodelvy.    Patient seen by provider today: No   present during visit today: Not Applicable.    Note:   Patient did not meet criteria for an asymptomatic covid-19 PCR test in infusion today. Patient was tested last week prior to cataract surgery.    Patient grateful that she is scheduled for a Covid vaccine on 3/15/21.    Intravenous Access:  Implanted Port.    Treatment Conditions:  Lab Results   Component Value Date    HGB 11.2 03/10/2021     Lab Results   Component Value Date    WBC 3.4 03/10/2021      Lab Results   Component Value Date    ANEU 2.1 03/10/2021     Lab Results   Component Value Date     03/10/2021      Results reviewed, labs MET treatment parameters, ok to proceed with treatment.      Post Infusion Assessment:  Patient tolerated infusion without incident.  Patient observed for 30 minutes post Trodelvy per protocol.  Site patent and intact, free from redness, edema or discomfort.  No evidence of extravasations.  Access discontinued per protocol.       Discharge Plan:   AVS to patient via MYCHART.  Patient will return 3/24/21 for next appointment.   Patient discharged in stable condition accompanied by: self.  Departure Mode: Ambulatory.    Lauren Shaw RN

## 2021-03-10 NOTE — PROGRESS NOTES
See systems review flow sheet for details of port access. Labs sent: cbc, cmp, mag, phosphorus. IVAD needle left in place for chemotherapy to follow.    Keira Brooks RN

## 2021-03-12 ENCOUNTER — MYC MEDICAL ADVICE (OUTPATIENT)
Dept: ONCOLOGY | Facility: CLINIC | Age: 61
End: 2021-03-12

## 2021-03-12 LAB — LAB SCANNED RESULT: NORMAL

## 2021-03-15 ENCOUNTER — IMMUNIZATION (OUTPATIENT)
Dept: PEDIATRICS | Facility: CLINIC | Age: 61
End: 2021-03-15
Payer: COMMERCIAL

## 2021-03-15 LAB — LAB SCANNED RESULT: NORMAL

## 2021-03-15 PROCEDURE — 0001A PR COVID VAC PFIZER DIL RECON 30 MCG/0.3 ML IM: CPT

## 2021-03-15 PROCEDURE — 91300 PR COVID VAC PFIZER DIL RECON 30 MCG/0.3 ML IM: CPT

## 2021-03-16 DIAGNOSIS — G89.3 CANCER ASSOCIATED PAIN: ICD-10-CM

## 2021-03-16 RX ORDER — MORPHINE SULFATE 15 MG/1
15 TABLET, FILM COATED, EXTENDED RELEASE ORAL EVERY EVENING
Qty: 30 TABLET | Refills: 0 | Status: SHIPPED | OUTPATIENT
Start: 2021-03-16 | End: 2021-04-12

## 2021-03-16 NOTE — TELEPHONE ENCOUNTER
Received call from patient requesting refill of MS contin 15mg.   Last refill: 2/15/21  Last office visit: 11/30/20  Scheduled for follow up 3/29/21   Will route request to MD for review. Dr Alva Whitaker

## 2021-03-18 ENCOUNTER — VIRTUAL VISIT (OUTPATIENT)
Dept: ONCOLOGY | Facility: CLINIC | Age: 61
End: 2021-03-18
Attending: INTERNAL MEDICINE
Payer: COMMERCIAL

## 2021-03-18 DIAGNOSIS — C50.912 BILATERAL MALIGNANT NEOPLASM OF BREAST IN FEMALE, UNSPECIFIED ESTROGEN RECEPTOR STATUS, UNSPECIFIED SITE OF BREAST (H): Primary | ICD-10-CM

## 2021-03-18 DIAGNOSIS — C50.911 BILATERAL MALIGNANT NEOPLASM OF BREAST IN FEMALE, UNSPECIFIED ESTROGEN RECEPTOR STATUS, UNSPECIFIED SITE OF BREAST (H): Primary | ICD-10-CM

## 2021-03-18 PROCEDURE — 99215 OFFICE O/P EST HI 40 MIN: CPT | Mod: GT | Performed by: INTERNAL MEDICINE

## 2021-03-18 PROCEDURE — 999N001193 HC VIDEO/TELEPHONE VISIT; NO CHARGE

## 2021-03-18 NOTE — PATIENT INSTRUCTIONS
Shaneka,    It was a pleasure to meet you today.       Here are some of the resources we had discussed:    - Thrive series    - Taking Charge of your Survivorship ([email or web address])    - Annual Cancer Survivorship Conference    The info regarding the conference and the Thrive series is available at [email or web address]        We will plan to ask pathology to send your tumor off for androgen receptor testing.    I will review the potential future clinical trial options with Dr Zepeda to keep them on his radar should you need newer thearpies in the future.    It was a pleasure to meet you; let me know if I can do anything else for you.    Arelis Torres

## 2021-03-18 NOTE — LETTER
3/18/2021         RE: Shaneka Alvarez  [address] [facility] [address]        Dear Colleague,    Thank you for referring your patient, Shaneka Alvarez, to the [facility]. Please see a copy of my visit note below.    Shaneka is a 60 year old who is being evaluated via a billable video visit.      How would you like to obtain your AVS? MyChart  If the video visit is dropped, the invitation should be resent by: Text to cell phone: [phone]  Will anyone else be joining your video visit? No      Video Start Time: 800AM  Video-Visit Details    Type of service:  Video Visit    Video End Time:900AM    Originating Location (pt. Location): Home    Distant Location (provider location):  [facility]     Platform used for Video Visit: [email or web address]    ONCOLOGY FOLLOW UP NOTE: 3/2/2021        I took the history from reviewing the previous notes that she was following with Dr. Amaya.  I have copied and updated from prior notes.    ONCOLOGY History:    1. January 2006:  Diagnosed with Stage IIB, T2 N1 M0 invasive lobular carcinoma of the right breast.  Final pathology showed a 4.5 x 3.8 x 2.5 cm, 01/14 lymph nodes positive.  Estrogen, progesterone receptor positive, HER-2 negative.     2.  Genetics.  BRCA1 and 2 mutations not detected. 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       THERAPY TO DATE:   1. 2006:  ECOG 2104 protocol of dose-dense Adriamycin, Cytoxan and Avastin x4 cycles followed by Taxol and Avastin x4 cycles.   2.  03/2007:  Completed 1 year Avastin.   3.  11/2006-01/2007:  Radiotherapy to the right breast of 5040 cGy.   4.  03/[id number]:  Aromasin.  Stopped after moving to the [facility].   5.  01/2016:  Right modified radical mastectomy with latissimus dorsi flap reconstruction and a left prophylactic mastectomy with latissimus dorsi flap reconstruction.     She recently had MRI of the brain as a follow-up for multiple sclerosis and there  were multiple calvarial lesions noted which was suspicious for metastatic disease.  There was no evidence of new multiple sclerosis lesions.  She had a PET scan on 8/30/2019 which showed widespread bone metastatic lesions (calvarium, spine, sacrum, pelvis, ribs most prominent at C7, T3, L1 and L4), hypermetabolic 3 cm lesion in LLL, and mediastinal/hilar LN. CEA wnl at 1.9 and C27-29 elevated to 415 (28 on 8/23/16). A CT guided biopsy of the right iliac bone was obtained on 9/4/19, pathology consistent with metastatic adenocarcinoma (breast), ER/HI negative, HER-2 negative PDL 1 < 1%. A second biopsy was take of the LLL nodule via EBUS on 9/5/19 did not show any malignancy.    C/T/L spine MRI 8/3/19 to 9/2/19 with numerous enhancing lesions of the C, T and L spine, largest around T9 and L1. No evidence of cord compression. Has a L1 compression fracture and impending L4.     Received radiation T12-L5 3000 cGy in 10 fractions from 9/6/2019 till 9/18/2019.  Neurosurgery recommended no surgical intervention but to wear [place]ore brace when out of bed and HOB >30 degrees    She started palliative Xeloda 2000/1500 on 10/1/2019 but after just a few doses she noticed nausea, red eyes blurred vision, loss of appetite.  She stopped taking it after 5 doses and was seen by nurse practitioner on 10/7/2019 when she was improving.  At that point she was restarted on Xeloda at a lower dose of 1000 mg twice a day.  As she was not able to tolerate even the lower dose with extreme nausea and vomiting and feeling extremely fatigued and blurry vision, we decided on stopping Xeloda.    We decided on repeating scans and MRI brain on 10/25/2019 showed no intracranial metastatic disease.   Multiple enhancing calvarial lesions may be increased in number and are suggestive of metastatic disease. Thinner imaging on the current study may identify the smaller lesions and may be responsible for  identified more lesions.   There is a single focus of  T2 hyperintense signal within the anterior right temporal lobe may represent interval demyelination. There is no evidence for active demyelination.    PET/CT on 11/1/2019 showed favorable response to therapy and Overall FDG uptake within scattered osseous metastases is decreased since 8/30/2019, particularly within the pelvis. Increased sclerotic appearance of L1 and L4 pathologic compression deformities, likely sequela of recently completed palliative radiation therapy.    No significant FDG uptake in previously demonstrated hypermetabolic mediastinal lymph nodes. Biopsy negative on 9/5/2019.  There is also decreased FDG uptake in the left lower lobe (biopsy negative for  malignancy), now with dense consolidation containing air bronchograms suggestive of infection versus aspiration.  There is diffuse FDG uptake within the esophagus, consistent with esophagitis.    Because of intolerance to Xeloda we decided on switching to weekly paclitaxel on 11/5/2019.    C#2 Taxol 12/4/19- started with 70mg/m2 dose reduction    C#3 Taxol 12/30/2019     PET/CT on 1/24/2020 showed progression of the disease in some of the bone lesions including increase in size and lytic lesion within the vertebral body of C4 measuring 1 cm as opposed to 0.6 cm previously and a new central soft tissue nodule with SUV max 4.1 when previously it was non-hypermetabolic.  There is also some progression noted in the right proximal femur and right iliac bone.  There is decreased metabolic uptake in the right lamina of T9 with SUV max 4.7 when previously it was 8.0.  Other lesions are stable.    CA 27-29 also had increased significantly and it was 257 on 1/28/2020.    We decided on switching to eribulin on 1/28/2020.    C#2 2/18/2020  C#2 D#8 2/25/2020    C#3 D#1 3/18/2020 -delayed by 1 week as per patient's preference because she wanted to visit her family.    She had a repeat PET/CT on 3/27/2020 which showed stable size of the bone metastatic lesions  although the FDG avidity was less, consistent with treatment response.    She had MRI of the thoracic spine on 4/3/2020 and it was compared to the one which was done in August 2019 and it showed increased size of several metastatic lesions most notably at T9 but also at T5-T7.  Other lesions at T10, T11 and T12 appeared improved.    CA 27-29 was also down to 222 on 3/18/2020.    Cycle #4 eribulin ( dose reduced to 1.2 mg/m2 )-4/7/2020-   Cycle #5 Eribulin ( 1.2 mg/m2 )- 4/28/2020   Cycle #6 Eribulin ( 1.2 mg/m2 )- 5/19/2020     Cycle #7 Eribulin ( 1.2 mg/m2 )- 6/9/2020    Cycle #8 Eribulin ( 1.2 mg/m2 )- 6/30/2020     She had a repeat PET scan on 7/17/2020 which showed incidental finding of new acute bilateral pulmonary embolism in the distal left main pulmonary artery extending in the left upper and lower lobes and in the segmental and branch arteries in the right lower lobe.    It also showed mildly increased FDG avidity in the lytic lesion in the T9 lamina and right pedicle with SUV max 5.3, previously 3.9.  That is also slightly increased FDG uptake to the left anterior fifth rib at the costochondral junction SUV max 3.9, previously 2.5.  There is slightly decreased FDG uptake in the right femoral neck lesion SUV max 2.2, previously 2.9.  FDG uptake in other bone lesions is fairly stable.  No new metastasis are seen.    CA 27-29 was 308 on 6/30/2020.  It was 286 on 5/19/2020.    Overall this is consistent with only slight progression versus stable disease.    She was started on Lovenox.    Cycle #9 eribulin 7/21/2020. CA 27-29 was 238    C#10 eribulin 8/18/2020. ( delayed by one week for ANC 0.9 )    C#11 Eribulin 9/8/2020    C#12 Eribulin 9/29/2020     C#13 Eribulin 10/20/2020     PET scan on 11/6/2020 shows progression of the disease with increased FDG uptake in the lytic lesions in the T9/T10 and right posterolateral elements as well as increased FDG uptake in the previously known hypermetabolic right iliac  bone lesion suggesting recurrence of previously treated metastasis.  There is new subtle lesion in the right manubrium.  There is also a new fracture in the anterolateral left fourth rib with associated uptake and this could be a pathologic fracture.  Healing fracture in the left anterior fifth rib lesion.  There is resolution of the left pulmonary emboli.  Decreased FDG uptake of the esophagus consistent with improving inflammation.    CA 27-29 on 10/20/2020 was also elevated at 489.    C#1 Trodelvy on 11/11/2020.  Cycle #2 Trodelvy 12/2/2020  Cycle number 2-day #8 Trodelvy 12/8/2020.    12/15/2020-12/16/2020.  She was admitted to the hospital with nausea vomiting and dehydration.  She had tachycardia and initial lactic acid of 5.  It decreased to 1.4 after 2 L of normal saline.  She also had hypokalemia and ANC was 1.3.  She was treated with IV fluids.  Procalcitonin was negative.  CTA of the chest was negative for pulmonary embolism or pneumonia.  No bacterial infection was documented.  Her medication which she was initially unable to tolerate at home, were restarted and she was discharged home once started to feel better.      We delayed the start of cycle number 3 x 1 week and decrease the dose of chemotherapy by 25%.  She also had neutropenia with ANC of 1.0.      She started cycle number 3-day #1 on 12/29/2020.  CA 27-29 was 392.    Cycle number 3-day #8 1/5/2021.    C#4 Trodelvy 1/20/2021- 75% dose    2/5/2021.  PET/CT overall shows a good response to treatment with improvement of previously hypermetabolic lesions in the spine and pelvis and stability of other bone meta stasis.  There is a single lytic lesion in the right iliac bone which demonstrates slight Yimi increased FDG uptake and has SUV max 4.7 while previously it was SUV max 3.4.  There was diffuse wall thickening of the esophagus with uptake in the esophagus in the fundus of the stomach and this could be seen with inflammation.    Trodelvy cycle #5  - 2/10/2021.  75% of the dose.  CA 27-29 was 337.    Trodelvy cycle #6 - 3/3/2021.  75% of the dose.        Interval history.  This is a video visit.    On 2/25/2020 when she had biopsy of the right acetabulum and it was consistent with metastatic lobular carcinoma.  Receptor studies and HER-2/sheldon FISH is pending.   She tells me that the last chemotherapy went pretty well.  Nausea was under good control.  She had some constipation but after that she had an episode of diarrhea but after that bowel movements were fine.  Pain is under fair control and she continues to be on morphine.  Continues to take calcium and vitamin D.  She denies any fevers infections or shortness of breath.  She has noticed that tingling and numbness in the fingertips is slightly worse.  No new swellings.  Overall energy is decent.      ECOG 1    ROS:  A 10 point comprehensive ROS was otherwise neg          I reviewed other history in epic as below.       PAST MEDICAL HISTORY:     1.  Breast cancer  2.  Multiple sclerosis.   3.  Depression.   4.  Migraines.   5.  Hypertension.   6.  Cholecystectomy and umbilical hernia repair.     SOCIAL HISTORY: She smoked for 5 years but quit many years ago.  Drinks alcohol socially.  She lives with her .  She is a teacher in middle school.       FAMILY HISTORY: She mentions that her mother had breast cancer at 49.  Both grandmothers had breast cancer but she is not sure of the age.  A couple of cousins have cancers.  Patient has 3 children who are healthy.    Current Outpatient Medications   Medication     acetaminophen (TYLENOL) 500 MG tablet     apixaban ANTICOAGULANT (ELIQUIS) 5 MG tablet     busPIRone (BUSPAR) 15 MG tablet     calcium citrate-vitamin D (CITRACAL) 315-250 MG-UNIT TABS     Cholecalciferol (VITAMIN D3 PO)     HEMP OIL OR EXTRACT OR OTHER CBD CANNABINOID, NOT MEDICAL CANNABIS,     loperamide (IMODIUM) 2 MG capsule     LORazepam (ATIVAN) 1 MG tablet     magnesium 250 MG tablet      morphine (MS CONTIN) 15 MG CR tablet     morphine (MSIR) 15 MG IR tablet     moxifloxacin (VIGAMOX) 0.5 % ophthalmic solution     OLANZapine (ZYPREXA) 5 MG tablet     potassium & sodium phosphates (POTASSIUM & SODIUM PHOSPHATES) 280-160-250 MG Packet     potassium chloride ER (KLOR-CON M) 10 MEQ CR tablet     prednisoLONE acetate (PRED FORTE) 1 % ophthalmic suspension     prednisoLONE acetate (PRED FORTE) 1 % ophthalmic suspension     promethazine (PHENERGAN) 25 MG tablet     propranolol ER (INDERAL LA) 80 MG 24 hr capsule     senna-docusate (SENOKOT-S/PERICOLACE) 8.6-50 MG tablet     traZODone (DESYREL) 50 MG tablet     venlafaxine (EFFEXOR-XR) 75 MG 24 hr capsule     No current facility-administered medications for this visit.         Allergies   Allergen Reactions     Bactrim [Sulfamethoxazole W/Trimethoprim]      Internal bleeding     Copaxone [Glatiramer] Hives          PHYSICAL EXAMINATION:     There were no vitals taken for this visit.  Wt Readings from Last 4 Encounters:   03/10/21 63 kg (138 lb 14.4 oz)   03/08/21 59 kg (130 lb)   03/03/21 60.9 kg (134 lb 4.8 oz)   02/18/21 62.4 kg (137 lb 8 oz)           Constitutional.  Looks well and in no apparent distress.   Eyes.  Without eye redness or apparent jaundice.   Respiratory.  Non labored breathing. Speaking in full sentences.    Skin.  No concerning skin rashes on the skin visualized.   Neurological.  Is alert and oriented.  Psychiatric.  Mood and affect seem appropriate.      The rest of a comprehensive physical examination is deferred due to Public Health Emergency video visit restrictions.      Labs and Imaging.    Reviewed.  2/17/2021.  CBC shows WBC 3.6, hemoglobin 11.1, platelets 214.  ANC 2.1.    CMP shows phosphorus 4.7, albumin 3.3, ALT 56.  Otherwise it was unremarkable.    CA 27-29 was 337.      Biopsy from the right acetabulum shows metastatic lobular carcinoma.  Hormone receptor studies and HER-2 FISH is pending.    2/5/2021.  PET/CT overall  shows a good response to treatment with improvement of previously hypermetabolic lesions in the spine and pelvis and stability of other bone meta stasis.  There is a single lytic lesion in the right iliac bone which demonstrates slight Yimi increased FDG uptake and has SUV max 4.7 while previously it was SUV max 3.4.  There was diffuse wall thickening of the esophagus with uptake in the esophagus in the fundus of the stomach and this could be seen with inflammation.      ASSESSMENT AND PLAN:   1.  History of stage IIB, T2 N1 M0 invasive lobular carcinoma of the right breast.  It was initially diagnosed in 2006 and at that time it was hormone receptor positive, HER-2 negative.  She was treated on ECOG 2104 protocol with dose-dense Adriamycin, Cytoxan and Avastin x4 cycles followed by Taxol and Avastin x4 cycles followed by a Avastin for 1 year.  She also received radiation to the right breast which she completed in January 2007 (5040 cGy).  She took Aromasin from 2007 to 2014.    Now she has metastatic breast cancer with extensive involvement of the bones.  There is also a left lower lobe hypermetabolic lesion and mediastinal lymph nodes which are hypermetabolic.  The biopsy from the right iliac bone is consistent with metastatic lobular breast cancer but it is hormone receptor and HER-2 negative and PDL 1 is also negative.    She has received 3000 cGy of palliative radiation to T12-L5 from 9/6/2019 till 9/18/2019.  She has been on Xeloda, taxol, eribulin, and now on Trodelvy, and is tolerating this reasonably well with a good response to the treatment.                She obtained a second opinion from Dr. Castillo from [facility] who recommended a repeat biopsy to be done to make sure that she really has triple negative breast cancer.  She had repeat biopsy done from the right acetabulum on 2/25/2021 which showed metastatic lobular breast cancer which confirmed triple negative breast cancer, without PDL1  expression.     Foundation one showed CDH1 mutation (initially lobular cancer), CCND1 amplification. FGF3, FGF4, FGF19 amplification which all are potentially targetable. Most promising is CCND1 amplification with abemaciclib showing response in 4/10 patients. FGF3,FGF4 and FGF19 are targetable with pan-FGFR inhibitors.     Will add AR staining to slides, may predict response to AR blockers.  May have some clinical trial options here in future as outlined by Dr Arshad  Will contact Dr Addison about role of additional CARIS testing.   Ultimately we discussed continuing sacituzumab.and Xgeva. If one or two spots keep growing while others are responding well, may consider RT to those area.     Nausea vomiting.  This was under good control this time and we will continue with Emend Decadron and Zofran.  She will continue takes Compazine.        Bone disease.  She has metastatic disease to the bone.  Previously she got palliative radiation to T12-L5 3000 cGy in 10 fractions from 9/6/2019 till 9/18/2019.  She was evaluated by orthopedics Dr. Bipin Elilott on 11/1/2019 and conservative management was recommended.  She was on Zometa every 3 months and is now on Xgeva which she received   on 2/17/2021.  Continue calcium and vitamin D.  Continue Xgeva monthly.      Pain management.  She is following with palliative care.  Continue morphine sulfate and MS Contin.       Leukopenia/Neutropenia.  We delayed chemotherapy by 1 week and also dose reduced it by 25% starting cycle #3.    She continues to have mild leukopenia but neutrophil count is normal.  Repeat labs tomorrow.      Anemia.  She has mild anemia from chemotherapy.  Continue to observe.      Bilateral pulmonary emboli.  Continue Eliquis for incidentally found PE on 7/17/2020.       Neuropathy.  She has noticed slightly worse neuropathy in the hands.  Continue to observe closely.  She has underlying chronic balance issues and heat and cold sensitivity from multiple  sclerosis.      Insomnia. trazodone     Vision changes.  Notable for complications during her chemotherapy.  We did not address this today.  L cataract extraction 2/8/21 with improvement in her vision.  R is being planned for in a few weeks.     Increased FDG ability in the colon.  She had FDG uptake in the cecum and ascending colon but no corresponding lesion was seen on the CT scan.  She is completely asymptomatic so at this time we will continue to observe.     Multiple sclerosis.  She will continue to follow with her neurologist Dr. Quiles.  Currently multiple sclerosis is under control and currently she is not taking any medications.      Return to clinic in 3 weeks.      Dr Oh Yuan    Heme/onc fellow        Addendum:         Patient Shaneka Alvarez was seen and evaluated by me with Dr. Dwayne Yuan as outlined in the above note.  I was present for the video visit for the key portions of this evaluation.  We reviewed her clinical history as well as her recent [facility]One testing suggesting a triple-negative breast cancer that is PD-L1 less than 1% overexpression.  She is currently on sacituzumab govitecan with overall stable disease based on a recent PET/CT scan in 02/2021.  We reviewed her FoundationOne testing and had conversations with the patient about her testing overall.  Given her stable disease, we would recommend continuation of sacituzumab govitecan in conjunction with Xgeva.      I discussed with her that there are 2 potential clinical trials that are in the pipeline here at the [facility], which may be options for her in the future.  One is using a CDK4/6 inhibitor trilaziclib sponsored by [employer] per [employer].  This study is not currently open, but would be something that could be considered for her in the future.  The second is a trial using olaparib plus durvalumab in patients with metastatic triple-negative breast cancer.  I have to review the eligibility criteria for both  of these trials, but these would be potential considerations for her in the future.      I did review with her that I would like her tumor tested for androgen receptor.  A number of estrogen-positive tumors that transition to estrogen negativity have overall overexpression of the androgen receptor.  I do not believe that this is tested as part of FoundationOne, and I will ask our pathologist to run this on her previous biopsies.  This would make her potentially eligible for androgen suppression and would also be a potential option for her in the future.      We reviewed with her that she has several other standard-of-care therapies that would be potential options for her, including the usage of Doxil as well as gemcitabine, vinorelbine as well as metronomic types of therapies with Cytoxan and methotrexate.  At this time, her disease overall appears stable and would recommend continuing her on sacituzumab govitecan.      I am happy to see Ms. Alvarez back at any time.  She is going to continue to see Dr. Zepeda as well as Jade Ruvalcaba [facility] in [place].  I will be in contact with both of them.      She did inquire about whether to have Caris testing performed.  I reviewed with her that Caris testing and FoundationOne are very similar platforms.  I will reach out to Dr. Ruvalcaba to determine whether there is any increased benefit for Caris testing.  At this time, I do not believe that a lot of additional information will be obtained from this panel and expressed this to the patient.      We also will provide her with additional Survivorship resources and educational materials about living well with her current cancer.      It was a pleasure to see her today.     Arelis Arshad MD

## 2021-03-19 LAB — COPATH REPORT: NORMAL

## 2021-03-22 ENCOUNTER — VIRTUAL VISIT (OUTPATIENT)
Dept: ONCOLOGY | Facility: CLINIC | Age: 61
End: 2021-03-22
Payer: COMMERCIAL

## 2021-03-22 VITALS — BODY MASS INDEX: 23.92 KG/M2 | HEIGHT: 62 IN | WEIGHT: 130 LBS

## 2021-03-22 DIAGNOSIS — C79.51 BONE METASTASES: ICD-10-CM

## 2021-03-22 DIAGNOSIS — D70.1 CHEMOTHERAPY-INDUCED NEUTROPENIA (H): Primary | ICD-10-CM

## 2021-03-22 DIAGNOSIS — T45.1X5A CHEMOTHERAPY-INDUCED NEUTROPENIA (H): Primary | ICD-10-CM

## 2021-03-22 DIAGNOSIS — C50.919 METASTATIC BREAST CANCER: ICD-10-CM

## 2021-03-22 DIAGNOSIS — C50.912 BILATERAL MALIGNANT NEOPLASM OF BREAST IN FEMALE, UNSPECIFIED ESTROGEN RECEPTOR STATUS, UNSPECIFIED SITE OF BREAST (H): ICD-10-CM

## 2021-03-22 DIAGNOSIS — E87.6 HYPOKALEMIA: ICD-10-CM

## 2021-03-22 DIAGNOSIS — C50.911 BILATERAL MALIGNANT NEOPLASM OF BREAST IN FEMALE, UNSPECIFIED ESTROGEN RECEPTOR STATUS, UNSPECIFIED SITE OF BREAST (H): ICD-10-CM

## 2021-03-22 PROCEDURE — 99214 OFFICE O/P EST MOD 30 MIN: CPT | Mod: GT | Performed by: INTERNAL MEDICINE

## 2021-03-22 RX ORDER — DIPHENHYDRAMINE HYDROCHLORIDE 50 MG/ML
50 INJECTION INTRAMUSCULAR; INTRAVENOUS
Status: CANCELLED
Start: 2021-03-31

## 2021-03-22 RX ORDER — ACETAMINOPHEN 325 MG/1
650 TABLET ORAL ONCE
Status: CANCELLED | OUTPATIENT
Start: 2021-03-31

## 2021-03-22 RX ORDER — NALOXONE HYDROCHLORIDE 0.4 MG/ML
.1-.4 INJECTION, SOLUTION INTRAMUSCULAR; INTRAVENOUS; SUBCUTANEOUS
Status: CANCELLED | OUTPATIENT
Start: 2021-03-24

## 2021-03-22 RX ORDER — SODIUM CHLORIDE 9 MG/ML
1000 INJECTION, SOLUTION INTRAVENOUS CONTINUOUS PRN
Status: CANCELLED
Start: 2021-03-31

## 2021-03-22 RX ORDER — DIPHENHYDRAMINE HCL 25 MG
50 CAPSULE ORAL ONCE
Status: CANCELLED | OUTPATIENT
Start: 2021-03-24

## 2021-03-22 RX ORDER — ALBUTEROL SULFATE 90 UG/1
1-2 AEROSOL, METERED RESPIRATORY (INHALATION)
Status: CANCELLED
Start: 2021-03-31

## 2021-03-22 RX ORDER — ACETAMINOPHEN 325 MG/1
650 TABLET ORAL ONCE
Status: CANCELLED | OUTPATIENT
Start: 2021-03-24

## 2021-03-22 RX ORDER — HEPARIN SODIUM (PORCINE) LOCK FLUSH IV SOLN 100 UNIT/ML 100 UNIT/ML
5 SOLUTION INTRAVENOUS
Status: CANCELLED | OUTPATIENT
Start: 2021-03-31

## 2021-03-22 RX ORDER — SODIUM CHLORIDE 9 MG/ML
1000 INJECTION, SOLUTION INTRAVENOUS CONTINUOUS PRN
Status: CANCELLED
Start: 2021-03-24

## 2021-03-22 RX ORDER — NALOXONE HYDROCHLORIDE 0.4 MG/ML
.1-.4 INJECTION, SOLUTION INTRAMUSCULAR; INTRAVENOUS; SUBCUTANEOUS
Status: CANCELLED | OUTPATIENT
Start: 2021-03-31

## 2021-03-22 RX ORDER — HEPARIN SODIUM (PORCINE) LOCK FLUSH IV SOLN 100 UNIT/ML 100 UNIT/ML
5 SOLUTION INTRAVENOUS
Status: CANCELLED | OUTPATIENT
Start: 2021-03-24

## 2021-03-22 RX ORDER — METHYLPREDNISOLONE SODIUM SUCCINATE 125 MG/2ML
125 INJECTION, POWDER, LYOPHILIZED, FOR SOLUTION INTRAMUSCULAR; INTRAVENOUS
Status: CANCELLED
Start: 2021-03-31

## 2021-03-22 RX ORDER — ALBUTEROL SULFATE 0.83 MG/ML
2.5 SOLUTION RESPIRATORY (INHALATION)
Status: CANCELLED | OUTPATIENT
Start: 2021-03-31

## 2021-03-22 RX ORDER — HEPARIN SODIUM,PORCINE 10 UNIT/ML
5 VIAL (ML) INTRAVENOUS
Status: CANCELLED | OUTPATIENT
Start: 2021-03-24

## 2021-03-22 RX ORDER — MEPERIDINE HYDROCHLORIDE 25 MG/ML
25 INJECTION INTRAMUSCULAR; INTRAVENOUS; SUBCUTANEOUS EVERY 30 MIN PRN
Status: CANCELLED | OUTPATIENT
Start: 2021-03-31

## 2021-03-22 RX ORDER — LORAZEPAM 2 MG/ML
0.5 INJECTION INTRAMUSCULAR EVERY 4 HOURS PRN
Status: CANCELLED
Start: 2021-03-24

## 2021-03-22 RX ORDER — METHYLPREDNISOLONE SODIUM SUCCINATE 125 MG/2ML
125 INJECTION, POWDER, LYOPHILIZED, FOR SOLUTION INTRAMUSCULAR; INTRAVENOUS
Status: CANCELLED
Start: 2021-03-24

## 2021-03-22 RX ORDER — LORAZEPAM 2 MG/ML
0.5 INJECTION INTRAMUSCULAR EVERY 4 HOURS PRN
Status: CANCELLED
Start: 2021-03-31

## 2021-03-22 RX ORDER — DIPHENHYDRAMINE HCL 25 MG
50 CAPSULE ORAL ONCE
Status: CANCELLED | OUTPATIENT
Start: 2021-03-31

## 2021-03-22 RX ORDER — MEPERIDINE HYDROCHLORIDE 25 MG/ML
25 INJECTION INTRAMUSCULAR; INTRAVENOUS; SUBCUTANEOUS EVERY 30 MIN PRN
Status: CANCELLED | OUTPATIENT
Start: 2021-03-24

## 2021-03-22 RX ORDER — ATROPINE SULFATE 0.4 MG/ML
0.4 AMPUL (ML) INJECTION
Status: CANCELLED | OUTPATIENT
Start: 2021-03-24

## 2021-03-22 RX ORDER — HEPARIN SODIUM,PORCINE 10 UNIT/ML
5 VIAL (ML) INTRAVENOUS
Status: CANCELLED | OUTPATIENT
Start: 2021-03-31

## 2021-03-22 RX ORDER — DIPHENHYDRAMINE HYDROCHLORIDE 50 MG/ML
50 INJECTION INTRAMUSCULAR; INTRAVENOUS
Status: CANCELLED
Start: 2021-03-24

## 2021-03-22 RX ORDER — ALBUTEROL SULFATE 90 UG/1
1-2 AEROSOL, METERED RESPIRATORY (INHALATION)
Status: CANCELLED
Start: 2021-03-24

## 2021-03-22 RX ORDER — ATROPINE SULFATE 0.4 MG/ML
0.4 AMPUL (ML) INJECTION
Status: CANCELLED | OUTPATIENT
Start: 2021-03-31

## 2021-03-22 RX ORDER — EPINEPHRINE 1 MG/ML
0.3 INJECTION, SOLUTION INTRAMUSCULAR; SUBCUTANEOUS EVERY 5 MIN PRN
Status: CANCELLED | OUTPATIENT
Start: 2021-03-31

## 2021-03-22 RX ORDER — EPINEPHRINE 1 MG/ML
0.3 INJECTION, SOLUTION INTRAMUSCULAR; SUBCUTANEOUS EVERY 5 MIN PRN
Status: CANCELLED | OUTPATIENT
Start: 2021-03-24

## 2021-03-22 RX ORDER — ALBUTEROL SULFATE 0.83 MG/ML
2.5 SOLUTION RESPIRATORY (INHALATION)
Status: CANCELLED | OUTPATIENT
Start: 2021-03-24

## 2021-03-22 ASSESSMENT — PAIN SCALES - GENERAL: PAINLEVEL: NO PAIN (0)

## 2021-03-22 ASSESSMENT — MIFFLIN-ST. JEOR: SCORE: 1122.93

## 2021-03-22 NOTE — PROGRESS NOTES
ONCOLOGY FOLLOW UP NOTE: 3/22/2021        I took the history from reviewing the previous notes that she was following with Dr. Amaya.  I have copied and updated from prior notes.    ONCOLOGY History:    1.  January 2006:  Diagnosed with Stage IIB, T2 N1 M0 invasive lobular carcinoma of the right breast.  Final pathology showed a 4.5 x 3.8 x 2.5 cm, 01/14 lymph nodes positive.  Estrogen, progesterone receptor positive, HER-2 negative.     2.  Genetics.  BRCA1 and 2 mutations not detected. 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       THERAPY TO DATE:   1.  2006:  ECOG 2104 protocol of dose-dense Adriamycin, Cytoxan and Avastin x4 cycles followed by Taxol and Avastin x4 cycles.   2.  03/2007:  Completed 1 year Avastin.   3.  11/2006-01/2007:  Radiotherapy to the right breast of 5040 cGy.   4.  03/20071520-1609:  Aromasin.  Stopped after moving to the [facility].   5.  01/2016:  Right modified radical mastectomy with latissimus dorsi flap reconstruction and a left prophylactic mastectomy with latissimus dorsi flap reconstruction.     She recently had MRI of the brain as a follow-up for multiple sclerosis and there were multiple calvarial lesions noted which was suspicious for metastatic disease.  There was no evidence of new multiple sclerosis lesions.  She had a PET scan on 8/30/2019 which showed widespread bone metastatic lesions (calvarium, spine, sacrum, pelvis, ribs most prominent at C7, T3, L1 and L4), hypermetabolic 3 cm lesion in LLL, and mediastinal/hilar LN. CEA wnl at 1.9 and C27-29 elevated to 415 (28 on 8/23/16). A CT guided biopsy of the right iliac bone was obtained on 9/4/19, pathology consistent with metastatic adenocarcinoma (breast), ER/ID negative, HER-2 negative PDL 1 < 1%. A second biopsy was take of the LLL nodule via EBUS on 9/5/19 did not show any malignancy.    C/T/L spine MRI 8/3/19 to 9/2/19 with numerous enhancing lesions of the C, T and L spine, largest around T9 and L1. No  evidence of cord compression. Has a L1 compression fracture and impending L4.     Received radiation T12-L5 3000 cGy in 10 fractions from 9/6/2019 till 9/18/2019.  Neurosurgery recommended no surgical intervention but to wear [place] brace when out of bed and HOB >30 degrees    She started palliative Xeloda 2000/1500 on 10/1/2019 but after just a few doses she noticed nausea, red eyes blurred vision, loss of appetite.  She stopped taking it after 5 doses and was seen by nurse practitioner on 10/7/2019 when she was improving.  At that point she was restarted on Xeloda at a lower dose of 1000 mg twice a day.  As she was not able to tolerate even the lower dose with extreme nausea and vomiting and feeling extremely fatigued and blurry vision, we decided on stopping Xeloda.    We decided on repeating scans and MRI brain on 10/25/2019 showed no intracranial metastatic disease.   Multiple enhancing calvarial lesions may be increased in number and are suggestive of metastatic disease. Thinner imaging on the current study may identify the smaller lesions and may be responsible for  identified more lesions.   There is a single focus of T2 hyperintense signal within the anterior right temporal lobe may represent interval demyelination. There is no evidence for active demyelination.    PET/CT on 11/1/2019 showed favorable response to therapy and Overall FDG uptake within scattered osseous metastases is decreased since 8/30/2019, particularly within the pelvis. Increased sclerotic appearance of L1 and L4 pathologic compression deformities, likely sequela of recently completed palliative radiation therapy.    No significant FDG uptake in previously demonstrated hypermetabolic mediastinal lymph nodes. Biopsy negative on 9/5/2019.  There is also decreased FDG uptake in the left lower lobe (biopsy negative for  malignancy), now with dense consolidation containing air bronchograms suggestive of infection versus aspiration.  There is  diffuse FDG uptake within the esophagus, consistent with esophagitis.    Because of intolerance to Xeloda we decided on switching to weekly paclitaxel on 11/5/2019.    C#2 Taxol 12/4/19- started with 70mg/m2 dose reduction    C#3 Taxol 12/30/2019     PET/CT on 1/24/2020 showed progression of the disease in some of the bone lesions including increase in size and lytic lesion within the vertebral body of C4 measuring 1 cm as opposed to 0.6 cm previously and a new central soft tissue nodule with SUV max 4.1 when previously it was non-hypermetabolic.  There is also some progression noted in the right proximal femur and right iliac bone.  There is decreased metabolic uptake in the right lamina of T9 with SUV max 4.7 when previously it was 8.0.  Other lesions are stable.    CA 27-29 also had increased significantly and it was 257 on 1/28/2020.    We decided on switching to eribulin on 1/28/2020.    C#2 2/18/2020  C#2 D#8 2/25/2020    C#3 D#1 3/18/2020 -delayed by 1 week as per patient's preference because she wanted to visit her family.    She had a repeat PET/CT on 3/27/2020 which showed stable size of the bone metastatic lesions although the FDG avidity was less, consistent with treatment response.    She had MRI of the thoracic spine on 4/3/2020 and it was compared to the one which was done in August 2019 and it showed increased size of several metastatic lesions most notably at T9 but also at T5-T7.  Other lesions at T10, T11 and T12 appeared improved.    CA 27-29 was also down to 222 on 3/18/2020.    Cycle #4 eribulin ( dose reduced to 1.2 mg/m2 )-4/7/2020-   Cycle #5 Eribulin ( 1.2 mg/m2 )- 4/28/2020   Cycle #6 Eribulin ( 1.2 mg/m2 )- 5/19/2020     Cycle #7 Eribulin ( 1.2 mg/m2 )- 6/9/2020    Cycle #8 Eribulin ( 1.2 mg/m2 )- 6/30/2020     She had a repeat PET scan on 7/17/2020 which showed incidental finding of new acute bilateral pulmonary embolism in the distal left main pulmonary artery extending in the left  upper and lower lobes and in the segmental and branch arteries in the right lower lobe.    It also showed mildly increased FDG avidity in the lytic lesion in the T9 lamina and right pedicle with SUV max 5.3, previously 3.9.  That is also slightly increased FDG uptake to the left anterior fifth rib at the costochondral junction SUV max 3.9, previously 2.5.  There is slightly decreased FDG uptake in the right femoral neck lesion SUV max 2.2, previously 2.9.  FDG uptake in other bone lesions is fairly stable.  No new metastasis are seen.    CA 27-29 was 308 on 6/30/2020.  It was 286 on 5/19/2020.    Overall this is consistent with only slight progression versus stable disease.    She was started on Lovenox.    Cycle #9 eribulin 7/21/2020. CA 27-29 was 238    C#10 eribulin 8/18/2020. ( delayed by one week for ANC 0.9 )    C#11 Eribulin 9/8/2020    C#12 Eribulin 9/29/2020     C#13 Eribulin 10/20/2020     PET scan on 11/6/2020 shows progression of the disease with increased FDG uptake in the lytic lesions in the T9/T10 and right posterolateral elements as well as increased FDG uptake in the previously known hypermetabolic right iliac bone lesion suggesting recurrence of previously treated metastasis.  There is new subtle lesion in the right manubrium.  There is also a new fracture in the anterolateral left fourth rib with associated uptake and this could be a pathologic fracture.  Healing fracture in the left anterior fifth rib lesion.  There is resolution of the left pulmonary emboli.  Decreased FDG uptake of the esophagus consistent with improving inflammation.    CA 27-29 on 10/20/2020 was also elevated at 489.    C#1 Trodelvy on 11/11/2020.  Cycle #2 Trodelvy 12/2/2020  Cycle number 2-day #8 Trodelvy 12/8/2020.    12/15/2020-12/16/2020.  She was admitted to the hospital with nausea vomiting and dehydration.  She had tachycardia and initial lactic acid of 5.  It decreased to 1.4 after 2 L of normal saline.  She also  had hypokalemia and ANC was 1.3.  She was treated with IV fluids.  Procalcitonin was negative.  CTA of the chest was negative for pulmonary embolism or pneumonia.  No bacterial infection was documented.  Her medication which she was initially unable to tolerate at home, were restarted and she was discharged home once started to feel better.      We delayed the start of cycle number 3 x 1 week and decrease the dose of chemotherapy by 25%.  She also had neutropenia with ANC of 1.0.      She started cycle number 3-day #1 on 12/29/2020.  CA 27-29 was 392.    Cycle number 3-day #8 1/5/2021.    C#4 Trodelvy 1/20/2021- 75% dose    2/5/2021.  PET/CT overall shows a good response to treatment with improvement of previously hypermetabolic lesions in the spine and pelvis and stability of other bone meta stasis.  There is a single lytic lesion in the right iliac bone which demonstrates slight Yimi increased FDG uptake and has SUV max 4.7 while previously it was SUV max 3.4.  There was diffuse wall thickening of the esophagus with uptake in the esophagus in the fundus of the stomach and this could be seen with inflammation.    Trodelvy cycle #5 - 2/10/2021.  75% of the dose.  CA 27-29 was 337.    Trodelvy cycle #6 - 3/3/2021.  75% of the dose.  Trodelvy cycle #6, D#8 - 3/10/2021.  75% of the dose.    On 2/25/2020 when she had biopsy of the right acetabulum and it was consistent with metastatic lobular carcinoma.  Again it was Triple Negative.    Interval history.  This is a video visit.    On 3/18/2020 when she also met with Dr. Arelis Arshad who also advised testing for androgen receptor studies on the biopsy specimen.  Tumor was diffusely androgen receptor positive.    She tolerated chemo well. No nausea or vomiting, diarrhea or constipation. Pain is under control.  No infections. She got 1st covid shot a week ago. No new swellings. She thinks that the lump in the scalp might be slightly larger. It does not bother her. She thinks  neuropathy is very mild.   She is taking calcium and D. Energy is the same.     ECOG 1    ROS:  A comprehensive ROS was otherwise neg          I reviewed other history in epic as below.       PAST MEDICAL HISTORY:     1.  Breast cancer  2.  Multiple sclerosis.   3.  Depression.   4.  Migraines.   5.  Hypertension.   6.  Cholecystectomy and umbilical hernia repair.     SOCIAL HISTORY: She smoked for 5 years but quit many years ago.  Drinks alcohol socially.  She lives with her .  She is a teacher in middle school.       FAMILY HISTORY: She mentions that her mother had breast cancer at 49.  Both grandmothers had breast cancer but she is not sure of the age.  A couple of cousins have cancers.  Patient has 3 children who are healthy.    Current Outpatient Medications   Medication     acetaminophen (TYLENOL) 500 MG tablet     apixaban ANTICOAGULANT (ELIQUIS) 5 MG tablet     busPIRone (BUSPAR) 15 MG tablet     calcium citrate-vitamin D (CITRACAL) 315-250 MG-UNIT TABS     Cholecalciferol (VITAMIN D3 PO)     HEMP OIL OR EXTRACT OR OTHER CBD CANNABINOID, NOT MEDICAL CANNABIS,     loperamide (IMODIUM) 2 MG capsule     LORazepam (ATIVAN) 1 MG tablet     magnesium 250 MG tablet     morphine (MS CONTIN) 15 MG CR tablet     morphine (MSIR) 15 MG IR tablet     moxifloxacin (VIGAMOX) 0.5 % ophthalmic solution     OLANZapine (ZYPREXA) 5 MG tablet     potassium & sodium phosphates (POTASSIUM & SODIUM PHOSPHATES) 280-160-250 MG Packet     potassium chloride ER (KLOR-CON M) 10 MEQ CR tablet     prednisoLONE acetate (PRED FORTE) 1 % ophthalmic suspension     prednisoLONE acetate (PRED FORTE) 1 % ophthalmic suspension     promethazine (PHENERGAN) 25 MG tablet     propranolol ER (INDERAL LA) 80 MG 24 hr capsule     senna-docusate (SENOKOT-S/PERICOLACE) 8.6-50 MG tablet     traZODone (DESYREL) 50 MG tablet     venlafaxine (EFFEXOR-XR) 75 MG 24 hr capsule     No current facility-administered medications for this visit.          Allergies   Allergen Reactions     Bactrim [Sulfamethoxazole W/Trimethoprim]      Internal bleeding     Copaxone [Glatiramer] Hives          PHYSICAL EXAMINATION:     There were no vitals taken for this visit.  Wt Readings from Last 4 Encounters:   03/10/21 63 kg (138 lb 14.4 oz)   03/08/21 59 kg (130 lb)   03/03/21 60.9 kg (134 lb 4.8 oz)   02/18/21 62.4 kg (137 lb 8 oz)           Constitutional.  Does not seem to be in any acute distress.  Eyes.  No redness or discharge noted.  Respiratory.  Speaking in full sentences.  Breathing seems comfortable without any accessory use of muscles.    Skin.  Visualized his skin does not show any obvious rashes.  Musculoskeletal.  Range of motion for visualized areas is intact.  Neurological.  Alert and oriented x3.  Psychiatric.  Mood, mentation and affect are normal.  Decision making capacity is intact.      The rest of a comprehensive physical examination is deferred due to Public Health Emergency video visit restrictions.      Labs and Imaging.    Reviewed.  3/10/2021.  CBC shows WBC 3.4.  Absolute neutrophil count 2.1.  Hemoglobin 11.2.  Platelets 194.    CMP shows normal renal functions and calcium levels.  Albumin 3.3.  Total protein 6.1.  Rest is unremarkable.    CA 27-29 on 3/3/2021 was 422.      Biopsy from the right acetabulum shows metastatic lobular carcinoma.  It is triple negative.  Androgen receptor was diffusely positive (90%, moderate   intensity) by immunohistochemistry. )  PD-L1 negative.      Foundation 1 testing did not reveal any actionable mutations.          2/5/2021.  PET/CT overall shows a good response to treatment with improvement of previously hypermetabolic lesions in the spine and pelvis and stability of other bone meta stasis.  There is a single lytic lesion in the right iliac bone which demonstrates slight Yimi increased FDG uptake and has SUV max 4.7 while previously it was SUV max 3.4.  There was diffuse wall thickening of the esophagus with  uptake in the esophagus in the fundus of the stomach and this could be seen with inflammation.      ASSESSMENT AND PLAN:   1.  History of stage IIB, T2 N1 M0 invasive lobular carcinoma of the right breast.  It was initially diagnosed in 2006 and at that time it was hormone receptor positive, HER-2 negative.  She was treated on ECOG 2104 protocol with dose-dense Adriamycin, Cytoxan and Avastin x4 cycles followed by Taxol and Avastin x4 cycles followed by a Avastin for 1 year.  She also received radiation to the right breast which she completed in January 2007 (5040 cGy).  She took Aromasin from 2007 to 2014.    Now she has metastatic breast cancer with extensive involvement of the bones.  There is also a left lower lobe hypermetabolic lesion and mediastinal lymph nodes which are hypermetabolic.  The biopsy from the right iliac bone is consistent with metastatic lobular breast cancer but it is hormone receptor and HER-2 negative and PDL 1 is also negative.    She has received 3000 cGy of palliative radiation to T12-L5 from 9/6/2019 till 9/18/2019.    She was started on palliative Xeloda but she was unable to tolerate even the dose reduced medication.        We decided on repeating scans and MRI brain on 10/25/2019 showed no intracranial metastatic disease.   Multiple enhancing calvarial lesions may be increased in number and are suggestive of metastatic disease. Thinner imaging on the current study may identify the smaller lesions and may be responsible for identified more lesions.   There is a single focus of T2 hyperintense signal within the anterior right temporal lobe may represent interval demyelination. There is no evidence for active demyelination.    PET/CT on 11/1/2019 showed favorable response to therapy and Overall FDG uptake within scattered osseous metastases is decreased since 8/30/2019, particularly within the pelvis. Increased sclerotic appearance of L1 and L4 pathologic compression deformities, likely  sequela of recently completed palliative radiation therapy.    No significant FDG uptake in previously demonstrated hypermetabolic mediastinal lymph nodes. Biopsy negative on 9/5/2019.  There is also decreased FDG uptake in the left lower lobe (biopsy negative for malignancy), now with dense consolidation containing air bronchograms suggestive of infection versus aspiration.  There is diffuse FDG uptake within the esophagus, consistent with esophagitis.    Because of intolerance to Xeloda we decided on switching to weekly paclitaxel on 11/5/2019.    For better tolerance we decreased the dose of paclitaxel to 70 mg/m  with cycle #2.  She has completed 3 cycles of Taxol and the last chemotherapy was on 1/14/2020.      PET/CT on 1/24/2020 showed progression of the disease in some of the bone lesions including increase in size and lytic lesion within the vertebral body of C4 measuring 1 cm as opposed to 0.6 cm previously and a new central soft tissue nodule with SUV max 4.1 when previously it was non-hypermetabolic.  There is also some progression noted in the right proximal femur and right iliac bone.  There is decreased metabolic uptake in the right lamina of T9 with SUV max 4.7 when previously it was 8.0.  Other lesions are stable.    There are no pathologically enlarged mediastinal, hilar or axillary lymph nodes. Calcified subcarinal lymph nodes. There are no suspicious lung nodules or evidence for infection and previous left lower lobe consolidation has resolved.     CA 27-29 also had increased significantly and it was 257 on 1/28/2020.    Due to progression we switched to eribulin on 1/28/2020.        After 3 cycles, she had a repeat PET/CT on 3/27/2020 which showed a stable size of the bone metastatic lesions although the FDG avidity was less, consistent with treatment response.    She had MRI of the thoracic spine on 4/3/2020 and it was compared to the one which was done in August 2019 and it showed increased  size of several metastatic lesions most notably at T9 but also at T5-T7.  Other lesions at T10, T11 and T12 appeared improved.    CA 27-29 was also down to 222 on 3/18/2020.    I believe overall this is consistent with a partial response to the treatment.  Overall she is tolerating eribulin well with some worsening of neuropathy and cytopenias.     We decided on continuing the chemotherapy with some modifications and she got cycle #4 with slightly decreased dose of eribulin to 1.2 mg/m2.      After 8 cycles of eribulin repeat PET CT on 7/17/2020 showed only minimally increased FDG uptake in T9 and T10 and in the left anterior fifth rib near the costochondral junction.  That is slightly decreased FDG avidity in the right femoral neck lesion.  Otherwise bone disease is a stable.  No other evidence of metastatic disease is found.    CA 27-29 on 6/30/2020 was 308.    We decided on continuing the same chemotherapy because overall clinical picture was consistent with stable to only minimally progressive disease and she was tolerating treatments well with decent quality of life.    Cycle #10 was delayed by 1 week because ANC was 0.9.        After 13 cycles of eribulin, PET scan on 11/6/2020 shows progression of the disease with increased FDG uptake in the lytic lesions in the T9/T10 and right posterolateral elements as well as increased FDG uptake in the previously known hypermetabolic right iliac bone lesion suggesting recurrence of previously treated metastasis.  There is new subtle lesion in the right manubrium.  There is also a new fracture in the anterolateral left fourth rib with associated uptake and this could be a pathologic fracture.  Healing fracture in the left anterior fifth rib lesion.  There is resolution of the left pulmonary emboli.  Decreased FDG uptake of the esophagus consistent with improving inflammation.    Because of progression of the disease, I recommend switching to recently FDA approved  sacituzumab govitecan-hziy (Trodelvy) for TNBC, based on the results of the phase II IMMU-132-01 clinical trial.   We discussed the rational schedule and potential side effects of it in detail.    We gave it to her on 11/11/2020.    She had significant nausea and vomiting with it but otherwise tolerated it well.   She started cycle #2 on 12/2/2020 and received day 8 on 12/8/2020.  She then got admitted for nausea vomiting dehydration and weakness.     We delayed the start of cycle number 3 x 1 week and decrease the dose of chemotherapy by 25% and she also has neutropenia with ANC of 1.  She started cycle number 3-day #1 on 12/29/2020.    CA 27-29 was 392.    Cycle number 3-day #8 1/5/2021.  She has been tolerating the reduced dose chemotherapy well.  After 4 cycles of chemotherapy, overall the PET scan shows positive response to treatment apart from mildly increased FDG avidity in one of the right iliac bone lesions.  CA 27-29 was 454 slightly elevated from before.    We decided on continuing the same chemotherapy.      She obtained a second opinion from Dr. Castillo from [facility] who recommended a repeat biopsy to be done to make sure that she really has triple negative breast cancer.      She also met with Dr. Arelis Arshad on 3/18/2021.    She had repeat biopsy done from the right acetabulum on 2/25/2021 which showed metastatic lobular breast cancer.  Hormonal studies, HER-2 FISH again showed that it is triple negative.  Androgen receptor is diffusely positive.    PD-L1 negative.  Foundation 1 testing did not reveal any actionable mutations.    Overall she is tolerating treatments well and she will proceed with next cycle of Trodelvy on 3/24/2021.    We discussed that potentially in the future we can use androgen receptor blockers like Casodex or enzalutamide because she has androgen receptor positive tumor.    Nausea vomiting.  Continue with Emend Decadron and Zofran.  Nausea was under good control.   She takes Compazine as needed.       Bone disease.  She has metastatic disease to the bone.  Previously she got palliative radiation to T12-L5 3000 cGy in 10 fractions from 9/6/2019 till 9/18/2019.  She was evaluated by orthopedics Dr. Bipin Elliott on 11/1/2019 and conservative management was recommended.    She was on Zometa every 3 months. She last received on 9/29/2020.  Because of progression of the disease in the bones, I recommended switching to Xgeva which she received on 11/11/2020.  She last got Xgeva on 2/17/2021.  She will again get it on 3/24/2021.  Continue vitamin D and calcium.        Pain management.  Pain is under decent control with morphine sulfate immediate release and MS Contin.  She is following with palliative care.    Leukopenia/Neutropenia.  We delayed chemotherapy by 1 week and also dose reduced it by 25% starting cycle #3.    She has mild leukopenia with a normal neutrophil count.  Continue to observe.      Anemia.  Related to chemotherapy.  Hemoglobin 11.2.  Continue to serially monitor.      Bilateral pulmonary emboli.  Continue Eliquis for incidentally found PE on 7/17/2020.       Neuropathy.  She mentions that she has mild neuropathy in the hands.  She has baseline chronic balance issues and heat and cold sensitivity from underlying multiple sclerosis.  Continue to observe.     Insomnia.  Continue trazodone.      We did not address the following today.  Wall thickening of esophagus and FDG uptake of fundus of the stomach.  It could be in the setting of inflammation from recent nausea and vomiting.  Symptoms have resolved.  Continue to monitor clinically.      Subcutaneous nodule in the scalp.  I am not certain whether it is a cyst or a metastatic deposit.  PET scan does not cause this to light up.  Continue to observe.      Vision changes.    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  It was thought that this could be due to Taxol as patient  reported to the ophthalmologist that she was on Taxol in September 2019 when her symptoms started.  But she was not on Taxol in September 2019 when she started noticing the visual changes so Taxol is not responsible for this.  The first dose of Taxol was on 11/5/2019.   She had left cataract extraction on 2/8/2021 and she has noticed significant improvement in her vision.  She plans to do cataract extraction of the right eye in couple of weeks.          Increased FDG ability in the colon.  She had FDG uptake in the cecum and ascending colon but no corresponding lesion was seen on the CT scan.  She is completely asymptomatic so at this time we will continue to observe.    Discussion regarding healthcare directives.  On previous visit she told me that she will bring the health care directive for our records but she forgot so I told her to bring it on next visit.      Breast implant removal.  She tells me that she was planning to do breast implant removal because there was a recall on this.  Her surgery was scheduled for 9/24/2019.  Because of this new development of metastatic breast cancer and her recent radiation, surgery has been canceled.  We discussed that at this time treatment for metastatic breast cancer would take precedence over the other surgery as the other surgery would require her to be off chemotherapy for several weeks and in that case the chances of cancer progression would be high and I would not recommend that.    Multiple sclerosis.  She will continue to follow with her neurologist Dr. Quiles.  Currently multiple sclerosis is under control and currently she is not taking any medications.      Return to clinic in 3 weeks to see nurse practitioner.  I will see her back in 6 weeks.    All of her questions were answered to her satisfaction.  She is agreeable and comfortable with the plan.  Dewayne Zepeda MD    Video start time. 11:40 AM  Video stop time. 11:50 AM

## 2021-03-22 NOTE — NURSING NOTE
Shaneka is a 60 year old who is being evaluated via a billable video visit.      How would you like to obtain your AVS? MyChart  If the video visit is dropped, the invitation should be resent by: Text to cell phone: [phone]  Will anyone else be joining your video visit? No      Video-Visit Details    Type of service:  Video Vis    Originating Location (pt. Location): Home    Distant Location (provider location):  [facility]     Platform used for Video Visit: Well     SYMPTOM QUESTIONNAIRE    Pain: no    Nausea/Vomiting: no    Mouth Sores: no    Shortness of Breath: no    Smoking: no    Fever or Chills: no    Hard Stools: no    Loose Stools: no    Weight Loss: no    Weakness: no    Burning, numbness or tingling in hands or feet: YES, fingers and toes     Problems with skin or swelling: no    Memory Loss: no    Anxiety or Depression: YES, but manageable     Trouble Sleeping: YES, occasionally, taking trazodone     No concerns    Rocío James CMA

## 2021-03-22 NOTE — LETTER
3/22/2021         RE: Shaneka Alvarez  [address] [place] [address]        Dear Colleague,    Thank you for referring your patient, Shaneka Alvarez, to the [facility]. Please see a copy of my visit note below.      ONCOLOGY FOLLOW UP NOTE: 3/22/2021        I took the history from reviewing the previous notes that she was following with Dr. Amaya.  I have copied and updated from prior notes.    ONCOLOGY History:    1.  January 2006:  Diagnosed with Stage IIB, T2 N1 M0 invasive lobular carcinoma of the right breast.  Final pathology showed a 4.5 x 3.8 x 2.5 cm, 01/14 lymph nodes positive.  Estrogen, progesterone receptor positive, HER-2 negative.     2.  Genetics.  BRCA1 and 2 mutations not detected. 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       THERAPY TO DATE:   1. 2006:  ECOG 2104 protocol of dose-dense Adriamycin, Cytoxan and Avastin x4 cycles followed by Taxol and Avastin x4 cycles.   2.  03/2007:  Completed 1 year Avastin.   3.  11/2006-01/2007:  Radiotherapy to the right breast of 5040 cGy.   4.  03/[id number]:  Aromasin.  Stopped after moving to the [facility].   5.  01/2016:  Right modified radical mastectomy with latissimus dorsi flap reconstruction and a left prophylactic mastectomy with latissimus dorsi flap reconstruction.     She recently had MRI of the brain as a follow-up for multiple sclerosis and there were multiple calvarial lesions noted which was suspicious for metastatic disease.  There was no evidence of new multiple sclerosis lesions.  She had a PET scan on 8/30/2019 which showed widespread bone metastatic lesions (calvarium, spine, sacrum, pelvis, ribs most prominent at C7, T3, L1 and L4), hypermetabolic 3 cm lesion in LLL, and mediastinal/hilar LN. CEA wnl at 1.9 and C27-29 elevated to 415 (28 on 8/23/16). A CT guided biopsy of the right iliac bone was obtained on 9/4/19, pathology consistent with metastatic adenocarcinoma (breast),  ER/MI negative, HER-2 negative PDL 1 < 1%. A second biopsy was take of the LLL nodule via EBUS on 9/5/19 did not show any malignancy.    C/T/L spine MRI 8/3/19 to 9/2/19 with numerous enhancing lesions of the C, T and L spine, largest around T9 and L1. No evidence of cord compression. Has a L1 compression fracture and impending L4.     Received radiation T12-L5 3000 cGy in 10 fractions from 9/6/2019 till 9/18/2019.  Neurosurgery recommended no surgical intervention but to wear [place] brace when out of bed and HOB >30 degrees    She started palliative Xeloda 2000/1500 on 10/1/2019 but after just a few doses she noticed nausea, red eyes blurred vision, loss of appetite.  She stopped taking it after 5 doses and was seen by nurse practitioner on 10/7/2019 when she was improving.  At that point she was restarted on Xeloda at a lower dose of 1000 mg twice a day.  As she was not able to tolerate even the lower dose with extreme nausea and vomiting and feeling extremely fatigued and blurry vision, we decided on stopping Xeloda.    We decided on repeating scans and MRI brain on 10/25/2019 showed no intracranial metastatic disease.   Multiple enhancing calvarial lesions may be increased in number and are suggestive of metastatic disease. Thinner imaging on the current study may identify the smaller lesions and may be responsible for  identified more lesions.   There is a single focus of T2 hyperintense signal within the anterior right temporal lobe may represent interval demyelination. There is no evidence for active demyelination.    PET/CT on 11/1/2019 showed favorable response to therapy and Overall FDG uptake within scattered osseous metastases is decreased since 8/30/2019, particularly within the pelvis. Increased sclerotic appearance of L1 and L4 pathologic compression deformities, likely sequela of recently completed palliative radiation therapy.    No significant FDG uptake in previously demonstrated hypermetabolic  mediastinal lymph nodes. Biopsy negative on 9/5/2019.  There is also decreased FDG uptake in the left lower lobe (biopsy negative for  malignancy), now with dense consolidation containing air bronchograms suggestive of infection versus aspiration.  There is diffuse FDG uptake within the esophagus, consistent with esophagitis.    Because of intolerance to Xeloda we decided on switching to weekly paclitaxel on 11/5/2019.    C#2 Taxol 12/4/19- started with 70mg/m2 dose reduction    C#3 Taxol 12/30/2019     PET/CT on 1/24/2020 showed progression of the disease in some of the bone lesions including increase in size and lytic lesion within the vertebral body of C4 measuring 1 cm as opposed to 0.6 cm previously and a new central soft tissue nodule with SUV max 4.1 when previously it was non-hypermetabolic.  There is also some progression noted in the right proximal femur and right iliac bone.  There is decreased metabolic uptake in the right lamina of T9 with SUV max 4.7 when previously it was 8.0.  Other lesions are stable.    CA 27-29 also had increased significantly and it was 257 on 1/28/2020.    We decided on switching to eribulin on 1/28/2020.    C#2 2/18/2020  C#2 D#8 2/25/2020    C#3 D#1 3/18/2020 -delayed by 1 week as per patient's preference because she wanted to visit her family.    She had a repeat PET/CT on 3/27/2020 which showed stable size of the bone metastatic lesions although the FDG avidity was less, consistent with treatment response.    She had MRI of the thoracic spine on 4/3/2020 and it was compared to the one which was done in August 2019 and it showed increased size of several metastatic lesions most notably at T9 but also at T5-T7.  Other lesions at T10, T11 and T12 appeared improved.    CA 27-29 was also down to 222 on 3/18/2020.    Cycle #4 eribulin ( dose reduced to 1.2 mg/m2 )-4/7/2020-   Cycle #5 Eribulin ( 1.2 mg/m2 )- 4/28/2020   Cycle #6 Eribulin ( 1.2 mg/m2 )- 5/19/2020     Cycle #7  Eribulin ( 1.2 mg/m2 )- 6/9/2020    Cycle #8 Eribulin ( 1.2 mg/m2 )- 6/30/2020     She had a repeat PET scan on 7/17/2020 which showed incidental finding of new acute bilateral pulmonary embolism in the distal left main pulmonary artery extending in the left upper and lower lobes and in the segmental and branch arteries in the right lower lobe.    It also showed mildly increased FDG avidity in the lytic lesion in the T9 lamina and right pedicle with SUV max 5.3, previously 3.9.  That is also slightly increased FDG uptake to the left anterior fifth rib at the costochondral junction SUV max 3.9, previously 2.5.  There is slightly decreased FDG uptake in the right femoral neck lesion SUV max 2.2, previously 2.9.  FDG uptake in other bone lesions is fairly stable.  No new metastasis are seen.    CA 27-29 was 308 on 6/30/2020.  It was 286 on 5/19/2020.    Overall this is consistent with only slight progression versus stable disease.    She was started on Lovenox.    Cycle #9 eribulin 7/21/2020. CA 27-29 was 238    C#10 eribulin 8/18/2020. ( delayed by one week for ANC 0.9 )    C#11 Eribulin 9/8/2020    C#12 Eribulin 9/29/2020     C#13 Eribulin 10/20/2020     PET scan on 11/6/2020 shows progression of the disease with increased FDG uptake in the lytic lesions in the T9/T10 and right posterolateral elements as well as increased FDG uptake in the previously known hypermetabolic right iliac bone lesion suggesting recurrence of previously treated metastasis.  There is new subtle lesion in the right manubrium.  There is also a new fracture in the anterolateral left fourth rib with associated uptake and this could be a pathologic fracture.  Healing fracture in the left anterior fifth rib lesion.  There is resolution of the left pulmonary emboli.  Decreased FDG uptake of the esophagus consistent with improving inflammation.    CA 27-29 on 10/20/2020 was also elevated at 489.    C#1 Trodelvy on 11/11/2020.  Cycle #2 Trodelvy  12/2/2020  Cycle number 2-day #8 Trodelvy 12/8/2020.    12/15/2020-12/16/2020.  She was admitted to the hospital with nausea vomiting and dehydration.  She had tachycardia and initial lactic acid of 5.  It decreased to 1.4 after 2 L of normal saline.  She also had hypokalemia and ANC was 1.3.  She was treated with IV fluids.  Procalcitonin was negative.  CTA of the chest was negative for pulmonary embolism or pneumonia.  No bacterial infection was documented.  Her medication which she was initially unable to tolerate at home, were restarted and she was discharged home once started to feel better.      We delayed the start of cycle number 3 x 1 week and decrease the dose of chemotherapy by 25%.  She also had neutropenia with ANC of 1.0.      She started cycle number 3-day #1 on 12/29/2020.  CA 27-29 was 392.    Cycle number 3-day #8 1/5/2021.    C#4 Trodelvy 1/20/2021- 75% dose    2/5/2021.  PET/CT overall shows a good response to treatment with improvement of previously hypermetabolic lesions in the spine and pelvis and stability of other bone meta stasis.  There is a single lytic lesion in the right iliac bone which demonstrates slight Yimi increased FDG uptake and has SUV max 4.7 while previously it was SUV max 3.4.  There was diffuse wall thickening of the esophagus with uptake in the esophagus in the fundus of the stomach and this could be seen with inflammation.    Trodelvy cycle #5 - 2/10/2021.  75% of the dose.  CA 27-29 was 337.    Trodelvy cycle #6 - 3/3/2021.  75% of the dose.  Trodelvy cycle #6, D#8 - 3/10/2021.  75% of the dose.    On 2/25/2020 when she had biopsy of the right acetabulum and it was consistent with metastatic lobular carcinoma.  Again it was Triple Negative.    Interval history.  This is a video visit.    On 3/18/2020 when she also met with Dr. Arelis Arshad who also advised testing for androgen receptor studies on the biopsy specimen.  Tumor was diffusely androgen receptor positive.    She  tolerated chemo well. No nausea or vomiting, diarrhea or constipation. Pain is under control.  No infections. She got 1st covid shot a week ago. No new swellings. She thinks that the lump in the scalp might be slightly larger. It does not bother her. She thinks neuropathy is very mild.   She is taking calcium and D. Energy is the same.     ECOG 1    ROS:  A comprehensive ROS was otherwise neg          I reviewed other history in epic as below.       PAST MEDICAL HISTORY:     1.  Breast cancer  2.  Multiple sclerosis.   3.  Depression.   4.  Migraines.   5.  Hypertension.   6.  Cholecystectomy and umbilical hernia repair.     SOCIAL HISTORY: She smoked for 5 years but quit many years ago.  Drinks alcohol socially.  She lives with her .  She is a teacher in middle school.       FAMILY HISTORY: She mentions that her mother had breast cancer at 49.  Both grandmothers had breast cancer but she is not sure of the age.  A couple of cousins have cancers.  Patient has 3 children who are healthy.    Current Outpatient Medications   Medication     acetaminophen (TYLENOL) 500 MG tablet     apixaban ANTICOAGULANT (ELIQUIS) 5 MG tablet     busPIRone (BUSPAR) 15 MG tablet     calcium citrate-vitamin D (CITRACAL) 315-250 MG-UNIT TABS     Cholecalciferol (VITAMIN D3 PO)     HEMP OIL OR EXTRACT OR OTHER CBD CANNABINOID, NOT MEDICAL CANNABIS,     loperamide (IMODIUM) 2 MG capsule     LORazepam (ATIVAN) 1 MG tablet     magnesium 250 MG tablet     morphine (MS CONTIN) 15 MG CR tablet     morphine (MSIR) 15 MG IR tablet     moxifloxacin (VIGAMOX) 0.5 % ophthalmic solution     OLANZapine (ZYPREXA) 5 MG tablet     potassium & sodium phosphates (POTASSIUM & SODIUM PHOSPHATES) 280-160-250 MG Packet     potassium chloride ER (KLOR-CON M) 10 MEQ CR tablet     prednisoLONE acetate (PRED FORTE) 1 % ophthalmic suspension     prednisoLONE acetate (PRED FORTE) 1 % ophthalmic suspension     promethazine (PHENERGAN) 25 MG tablet      propranolol ER (INDERAL LA) 80 MG 24 hr capsule     senna-docusate (SENOKOT-S/PERICOLACE) 8.6-50 MG tablet     traZODone (DESYREL) 50 MG tablet     venlafaxine (EFFEXOR-XR) 75 MG 24 hr capsule     No current facility-administered medications for this visit.         Allergies   Allergen Reactions     Bactrim [Sulfamethoxazole W/Trimethoprim]      Internal bleeding     Copaxone [Glatiramer] Hives          PHYSICAL EXAMINATION:     There were no vitals taken for this visit.  Wt Readings from Last 4 Encounters:   03/10/21 63 kg (138 lb 14.4 oz)   03/08/21 59 kg (130 lb)   03/03/21 60.9 kg (134 lb 4.8 oz)   02/18/21 62.4 kg (137 lb 8 oz)           Constitutional.  Does not seem to be in any acute distress.  Eyes.  No redness or discharge noted.  Respiratory.  Speaking in full sentences.  Breathing seems comfortable without any accessory use of muscles.    Skin.  Visualized his skin does not show any obvious rashes.  Musculoskeletal.  Range of motion for visualized areas is intact.  Neurological.  Alert and oriented x3.  Psychiatric.  Mood, mentation and affect are normal.  Decision making capacity is intact.      The rest of a comprehensive physical examination is deferred due to Public Health Emergency video visit restrictions.      Labs and Imaging.    Reviewed.  3/10/2021.  CBC shows WBC 3.4.  Absolute neutrophil count 2.1.  Hemoglobin 11.2.  Platelets 194.    CMP shows normal renal functions and calcium levels.  Albumin 3.3.  Total protein 6.1.  Rest is unremarkable.    CA 27-29 on 3/3/2021 was 422.      Biopsy from the right acetabulum shows metastatic lobular carcinoma.  It is triple negative.  Androgen receptor was diffusely positive (90%, moderate   intensity) by immunohistochemistry. )  PD-L1 negative.      Foundation 1 testing did not reveal any actionable mutations.          2/5/2021.  PET/CT overall shows a good response to treatment with improvement of previously hypermetabolic lesions in the spine and  pelvis and stability of other bone meta stasis.  There is a single lytic lesion in the right iliac bone which demonstrates slight Yimi increased FDG uptake and has SUV max 4.7 while previously it was SUV max 3.4.  There was diffuse wall thickening of the esophagus with uptake in the esophagus in the fundus of the stomach and this could be seen with inflammation.      ASSESSMENT AND PLAN:   1.  History of stage IIB, T2 N1 M0 invasive lobular carcinoma of the right breast.  It was initially diagnosed in 2006 and at that time it was hormone receptor positive, HER-2 negative.  She was treated on ECOG 2104 protocol with dose-dense Adriamycin, Cytoxan and Avastin x4 cycles followed by Taxol and Avastin x4 cycles followed by a Avastin for 1 year.  She also received radiation to the right breast which she completed in January 2007 (5040 cGy).  She took Aromasin from 2007 to 2014.    Now she has metastatic breast cancer with extensive involvement of the bones.  There is also a left lower lobe hypermetabolic lesion and mediastinal lymph nodes which are hypermetabolic.  The biopsy from the right iliac bone is consistent with metastatic lobular breast cancer but it is hormone receptor and HER-2 negative and PDL 1 is also negative.    She has received 3000 cGy of palliative radiation to T12-L5 from 9/6/2019 till 9/18/2019.    She was started on palliative Xeloda but she was unable to tolerate even the dose reduced medication.        We decided on repeating scans and MRI brain on 10/25/2019 showed no intracranial metastatic disease.   Multiple enhancing calvarial lesions may be increased in number and are suggestive of metastatic disease. Thinner imaging on the current study may identify the smaller lesions and may be responsible for identified more lesions.   There is a single focus of T2 hyperintense signal within the anterior right temporal lobe may represent interval demyelination. There is no evidence for active  demyelination.    PET/CT on 11/1/2019 showed favorable response to therapy and Overall FDG uptake within scattered osseous metastases is decreased since 8/30/2019, particularly within the pelvis. Increased sclerotic appearance of L1 and L4 pathologic compression deformities, likely sequela of recently completed palliative radiation therapy.    No significant FDG uptake in previously demonstrated hypermetabolic mediastinal lymph nodes. Biopsy negative on 9/5/2019.  There is also decreased FDG uptake in the left lower lobe (biopsy negative for malignancy), now with dense consolidation containing air bronchograms suggestive of infection versus aspiration.  There is diffuse FDG uptake within the esophagus, consistent with esophagitis.    Because of intolerance to Xeloda we decided on switching to weekly paclitaxel on 11/5/2019.    For better tolerance we decreased the dose of paclitaxel to 70 mg/m  with cycle #2.  She has completed 3 cycles of Taxol and the last chemotherapy was on 1/14/2020.      PET/CT on 1/24/2020 showed progression of the disease in some of the bone lesions including increase in size and lytic lesion within the vertebral body of C4 measuring 1 cm as opposed to 0.6 cm previously and a new central soft tissue nodule with SUV max 4.1 when previously it was non-hypermetabolic.  There is also some progression noted in the right proximal femur and right iliac bone.  There is decreased metabolic uptake in the right lamina of T9 with SUV max 4.7 when previously it was 8.0.  Other lesions are stable.    There are no pathologically enlarged mediastinal, hilar or axillary lymph nodes. Calcified subcarinal lymph nodes. There are no suspicious lung nodules or evidence for infection and previous left lower lobe consolidation has resolved.     CA 27-29 also had increased significantly and it was 257 on 1/28/2020.    Due to progression we switched to eribulin on 1/28/2020.        After 3 cycles, she had a repeat  PET/CT on 3/27/2020 which showed a stable size of the bone metastatic lesions although the FDG avidity was less, consistent with treatment response.    She had MRI of the thoracic spine on 4/3/2020 and it was compared to the one which was done in August 2019 and it showed increased size of several metastatic lesions most notably at T9 but also at T5-T7.  Other lesions at T10, T11 and T12 appeared improved.    CA 27-29 was also down to 222 on 3/18/2020.    I believe overall this is consistent with a partial response to the treatment.  Overall she is tolerating eribulin well with some worsening of neuropathy and cytopenias.     We decided on continuing the chemotherapy with some modifications and she got cycle #4 with slightly decreased dose of eribulin to 1.2 mg/m2.      After 8 cycles of eribulin repeat PET CT on 7/17/2020 showed only minimally increased FDG uptake in T9 and T10 and in the left anterior fifth rib near the costochondral junction.  That is slightly decreased FDG avidity in the right femoral neck lesion.  Otherwise bone disease is a stable.  No other evidence of metastatic disease is found.    CA 27-29 on 6/30/2020 was 308.    We decided on continuing the same chemotherapy because overall clinical picture was consistent with stable to only minimally progressive disease and she was tolerating treatments well with decent quality of life.    Cycle #10 was delayed by 1 week because ANC was 0.9.        After 13 cycles of eribulin, PET scan on 11/6/2020 shows progression of the disease with increased FDG uptake in the lytic lesions in the T9/T10 and right posterolateral elements as well as increased FDG uptake in the previously known hypermetabolic right iliac bone lesion suggesting recurrence of previously treated metastasis.  There is new subtle lesion in the right manubrium.  There is also a new fracture in the anterolateral left fourth rib with associated uptake and this could be a pathologic  fracture.  Healing fracture in the left anterior fifth rib lesion.  There is resolution of the left pulmonary emboli.  Decreased FDG uptake of the esophagus consistent with improving inflammation.    Because of progression of the disease, I recommend switching to recently FDA approved sacituzumab govitecan-hziy (Trodelvy) for TNBC, based on the results of the phase II IMMU-132-01 clinical trial.   We discussed the rational schedule and potential side effects of it in detail.    We gave it to her on 11/11/2020.    She had significant nausea and vomiting with it but otherwise tolerated it well.   She started cycle #2 on 12/2/2020 and received day 8 on 12/8/2020.  She then got admitted for nausea vomiting dehydration and weakness.     We delayed the start of cycle number 3 x 1 week and decrease the dose of chemotherapy by 25% and she also has neutropenia with ANC of 1.  She started cycle number 3-day #1 on 12/29/2020.    CA 27-29 was 392.    Cycle number 3-day #8 1/5/2021.  She has been tolerating the reduced dose chemotherapy well.  After 4 cycles of chemotherapy, overall the PET scan shows positive response to treatment apart from mildly increased FDG avidity in one of the right iliac bone lesions.  CA 27-29 was 454 slightly elevated from before.    We decided on continuing the same chemotherapy.      She obtained a second opinion from Dr. Castillo from [facility] who recommended a repeat biopsy to be done to make sure that she really has triple negative breast cancer.      She also met with Dr. Arelis Arshad on 3/18/2021.    She had repeat biopsy done from the right acetabulum on 2/25/2021 which showed metastatic lobular breast cancer.  Hormonal studies, HER-2 FISH again showed that it is triple negative.  Androgen receptor is diffusely positive.    PD-L1 negative.  Foundation 1 testing did not reveal any actionable mutations.    Overall she is tolerating treatments well and she will proceed with next cycle  of Trodelvy on 3/24/2021.    We discussed that potentially in the future we can use androgen receptor blockers like Casodex or enzalutamide because she has androgen receptor positive tumor.    Nausea vomiting.  Continue with Emend Decadron and Zofran.  Nausea was under good control.  She takes Compazine as needed.       Bone disease.  She has metastatic disease to the bone.  Previously she got palliative radiation to T12-L5 3000 cGy in 10 fractions from 9/6/2019 till 9/18/2019.  She was evaluated by orthopedics Dr. Bipin Elliott on 11/1/2019 and conservative management was recommended.    She was on Zometa every 3 months. She last received on 9/29/2020.  Because of progression of the disease in the bones, I recommended switching to Xgeva which she received on 11/11/2020.  She last got Xgeva on 2/17/2021.  She will again get it on 3/24/2021.  Continue vitamin D and calcium.        Pain management.  Pain is under decent control with morphine sulfate immediate release and MS Contin.  She is following with palliative care.    Leukopenia/Neutropenia.  We delayed chemotherapy by 1 week and also dose reduced it by 25% starting cycle #3.    She has mild leukopenia with a normal neutrophil count.  Continue to observe.      Anemia.  Related to chemotherapy.  Hemoglobin 11.2.  Continue to serially monitor.      Bilateral pulmonary emboli.  Continue Eliquis for incidentally found PE on 7/17/2020.       Neuropathy.  She mentions that she has mild neuropathy in the hands.  She has baseline chronic balance issues and heat and cold sensitivity from underlying multiple sclerosis.  Continue to observe.     Insomnia.  Continue trazodone.      We did not address the following today.  Wall thickening of esophagus and FDG uptake of fundus of the stomach.  It could be in the setting of inflammation from recent nausea and vomiting.  Symptoms have resolved.  Continue to monitor clinically.      Subcutaneous nodule in the scalp.  I am  not certain whether it is a cyst or a metastatic deposit.  PET scan does not cause this to light up.  Continue to observe.      Vision changes.    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  It was thought that this could be due to Taxol as patient reported to the ophthalmologist that she was on Taxol in September 2019 when her symptoms started.  But she was not on Taxol in September 2019 when she started noticing the visual changes so Taxol is not responsible for this.  The first dose of Taxol was on 11/5/2019.   She had left cataract extraction on 2/8/2021 and she has noticed significant improvement in her vision.  She plans to do cataract extraction of the right eye in couple of weeks.          Increased FDG ability in the colon.  She had FDG uptake in the cecum and ascending colon but no corresponding lesion was seen on the CT scan.  She is completely asymptomatic so at this time we will continue to observe.    Discussion regarding healthcare directives.  On previous visit she told me that she will bring the health care directive for our records but she forgot so I told her to bring it on next visit.      Breast implant removal.  She tells me that she was planning to do breast implant removal because there was a recall on this.  Her surgery was scheduled for 9/24/2019.  Because of this new development of metastatic breast cancer and her recent radiation, surgery has been canceled.  We discussed that at this time treatment for metastatic breast cancer would take precedence over the other surgery as the other surgery would require her to be off chemotherapy for several weeks and in that case the chances of cancer progression would be high and I would not recommend that.    Multiple sclerosis.  She will continue to follow with her neurologist Dr. Quiles.  Currently multiple sclerosis is under control and currently she is not taking any medications.      Return to clinic in 3  weeks to see nurse practitioner.  I will see her back in 6 weeks.    All of her questions were answered to her satisfaction.  She is agreeable and comfortable with the plan.  Dewayne Zepeda MD    Video start time. 11:40 AM  Video stop time. 11:50 AM              Again, thank you for allowing me to participate in the care of your patient.        Sincerely,        Dewayne Zepeda MD

## 2021-03-23 ENCOUNTER — PATIENT OUTREACH (OUTPATIENT)
Dept: ONCOLOGY | Facility: CLINIC | Age: 61
End: 2021-03-23

## 2021-03-23 NOTE — PROGRESS NOTES
[facility] paperwork completed for daughter, Nemesio, and faxed to [employer] @ [phone].  FAX confirmed as received.  Nemesio notified via voicemail message.

## 2021-03-24 ENCOUNTER — INFUSION THERAPY VISIT (OUTPATIENT)
Dept: INFUSION THERAPY | Facility: CLINIC | Age: 61
End: 2021-03-24
Payer: COMMERCIAL

## 2021-03-24 VITALS
OXYGEN SATURATION: 100 % | TEMPERATURE: 97.5 F | DIASTOLIC BLOOD PRESSURE: 69 MMHG | BODY MASS INDEX: 24.44 KG/M2 | HEART RATE: 68 BPM | WEIGHT: 133.6 LBS | SYSTOLIC BLOOD PRESSURE: 117 MMHG

## 2021-03-24 DIAGNOSIS — C50.912 BILATERAL MALIGNANT NEOPLASM OF BREAST IN FEMALE, UNSPECIFIED ESTROGEN RECEPTOR STATUS, UNSPECIFIED SITE OF BREAST (H): ICD-10-CM

## 2021-03-24 DIAGNOSIS — D70.1 CHEMOTHERAPY-INDUCED NEUTROPENIA (H): ICD-10-CM

## 2021-03-24 DIAGNOSIS — E87.6 HYPOKALEMIA: Primary | ICD-10-CM

## 2021-03-24 DIAGNOSIS — C50.911 BILATERAL MALIGNANT NEOPLASM OF BREAST IN FEMALE, UNSPECIFIED ESTROGEN RECEPTOR STATUS, UNSPECIFIED SITE OF BREAST (H): ICD-10-CM

## 2021-03-24 DIAGNOSIS — C50.919 METASTATIC BREAST CANCER: ICD-10-CM

## 2021-03-24 DIAGNOSIS — T45.1X5A CHEMOTHERAPY-INDUCED NEUTROPENIA (H): ICD-10-CM

## 2021-03-24 DIAGNOSIS — C79.51 BONE METASTASES: ICD-10-CM

## 2021-03-24 LAB
ALBUMIN SERPL-MCNC: 3.3 G/DL (ref 3.4–5)
ALP SERPL-CCNC: 63 U/L (ref 40–150)
ALT SERPL W P-5'-P-CCNC: 23 U/L (ref 0–50)
ANION GAP SERPL CALCULATED.3IONS-SCNC: 4 MMOL/L (ref 3–14)
AST SERPL W P-5'-P-CCNC: 16 U/L (ref 0–45)
BASOPHILS # BLD AUTO: 0 10E9/L (ref 0–0.2)
BASOPHILS NFR BLD AUTO: 0.9 %
BILIRUB SERPL-MCNC: 0.2 MG/DL (ref 0.2–1.3)
BUN SERPL-MCNC: 11 MG/DL (ref 7–30)
CALCIUM SERPL-MCNC: 8.6 MG/DL (ref 8.5–10.1)
CANCER AG27-29 SERPL-ACNC: 346 U/ML (ref 0–39)
CHLORIDE SERPL-SCNC: 107 MMOL/L (ref 94–109)
CO2 SERPL-SCNC: 30 MMOL/L (ref 20–32)
CREAT SERPL-MCNC: 0.66 MG/DL (ref 0.52–1.04)
DIFFERENTIAL METHOD BLD: ABNORMAL
EOSINOPHIL # BLD AUTO: 0.1 10E9/L (ref 0–0.7)
EOSINOPHIL NFR BLD AUTO: 2.5 %
ERYTHROCYTE [DISTWIDTH] IN BLOOD BY AUTOMATED COUNT: 15.3 % (ref 10–15)
GFR SERPL CREATININE-BSD FRML MDRD: >90 ML/MIN/{1.73_M2}
GLUCOSE SERPL-MCNC: 89 MG/DL (ref 70–99)
HCT VFR BLD AUTO: 34.9 % (ref 35–47)
HGB BLD-MCNC: 11.1 G/DL (ref 11.7–15.7)
IMM GRANULOCYTES # BLD: 0 10E9/L (ref 0–0.4)
IMM GRANULOCYTES NFR BLD: 0.6 %
LYMPHOCYTES # BLD AUTO: 0.6 10E9/L (ref 0.8–5.3)
LYMPHOCYTES NFR BLD AUTO: 19.3 %
MAGNESIUM SERPL-MCNC: 2.2 MG/DL (ref 1.6–2.3)
MCH RBC QN AUTO: 28.3 PG (ref 26.5–33)
MCHC RBC AUTO-ENTMCNC: 31.8 G/DL (ref 31.5–36.5)
MCV RBC AUTO: 89 FL (ref 78–100)
MONOCYTES # BLD AUTO: 0.4 10E9/L (ref 0–1.3)
MONOCYTES NFR BLD AUTO: 12.5 %
NEUTROPHILS # BLD AUTO: 2.1 10E9/L (ref 1.6–8.3)
NEUTROPHILS NFR BLD AUTO: 64.2 %
PHOSPHATE SERPL-MCNC: 3.1 MG/DL (ref 2.5–4.5)
PLATELET # BLD AUTO: 188 10E9/L (ref 150–450)
POTASSIUM SERPL-SCNC: 4.2 MMOL/L (ref 3.4–5.3)
PROT SERPL-MCNC: 6.2 G/DL (ref 6.8–8.8)
RBC # BLD AUTO: 3.92 10E12/L (ref 3.8–5.2)
SODIUM SERPL-SCNC: 141 MMOL/L (ref 133–144)
WBC # BLD AUTO: 3.2 10E9/L (ref 4–11)

## 2021-03-24 PROCEDURE — 96375 TX/PRO/DX INJ NEW DRUG ADDON: CPT | Performed by: INTERNAL MEDICINE

## 2021-03-24 PROCEDURE — 99207 PR NO CHARGE NURSE ONLY: CPT

## 2021-03-24 PROCEDURE — 85025 COMPLETE CBC W/AUTO DIFF WBC: CPT | Performed by: INTERNAL MEDICINE

## 2021-03-24 PROCEDURE — 96367 TX/PROPH/DG ADDL SEQ IV INF: CPT | Performed by: INTERNAL MEDICINE

## 2021-03-24 PROCEDURE — 96413 CHEMO IV INFUSION 1 HR: CPT | Performed by: INTERNAL MEDICINE

## 2021-03-24 PROCEDURE — 84100 ASSAY OF PHOSPHORUS: CPT | Performed by: INTERNAL MEDICINE

## 2021-03-24 PROCEDURE — 86300 IMMUNOASSAY TUMOR CA 15-3: CPT | Performed by: INTERNAL MEDICINE

## 2021-03-24 PROCEDURE — 83735 ASSAY OF MAGNESIUM: CPT | Performed by: INTERNAL MEDICINE

## 2021-03-24 PROCEDURE — 80053 COMPREHEN METABOLIC PANEL: CPT | Performed by: INTERNAL MEDICINE

## 2021-03-24 PROCEDURE — S0028 INJECTION, FAMOTIDINE, 20 MG: HCPCS | Performed by: INTERNAL MEDICINE

## 2021-03-24 PROCEDURE — 99207 PR NO CHARGE LOS: CPT

## 2021-03-24 RX ORDER — HEPARIN SODIUM (PORCINE) LOCK FLUSH IV SOLN 100 UNIT/ML 100 UNIT/ML
500 SOLUTION INTRAVENOUS ONCE
Status: COMPLETED | OUTPATIENT
Start: 2021-03-24 | End: 2021-03-24

## 2021-03-24 RX ORDER — DIPHENHYDRAMINE HCL 25 MG
50 CAPSULE ORAL ONCE
Status: COMPLETED | OUTPATIENT
Start: 2021-03-24 | End: 2021-03-24

## 2021-03-24 RX ORDER — HEPARIN SODIUM (PORCINE) LOCK FLUSH IV SOLN 100 UNIT/ML 100 UNIT/ML
5 SOLUTION INTRAVENOUS
Status: DISCONTINUED | OUTPATIENT
Start: 2021-03-24 | End: 2021-03-24 | Stop reason: HOSPADM

## 2021-03-24 RX ORDER — ACETAMINOPHEN 325 MG/1
650 TABLET ORAL ONCE
Status: COMPLETED | OUTPATIENT
Start: 2021-03-24 | End: 2021-03-24

## 2021-03-24 RX ADMIN — Medication 50 MG: at 11:52

## 2021-03-24 RX ADMIN — HEPARIN SODIUM (PORCINE) LOCK FLUSH IV SOLN 100 UNIT/ML 500 UNITS: 100 SOLUTION at 11:10

## 2021-03-24 RX ADMIN — Medication 500 ML: at 11:58

## 2021-03-24 RX ADMIN — ACETAMINOPHEN 650 MG: 325 TABLET ORAL at 11:51

## 2021-03-24 RX ADMIN — HEPARIN SODIUM (PORCINE) LOCK FLUSH IV SOLN 100 UNIT/ML 5 ML: 100 SOLUTION at 14:18

## 2021-03-24 ASSESSMENT — PAIN SCALES - GENERAL: PAINLEVEL: NO PAIN (0)

## 2021-03-24 NOTE — PROGRESS NOTES
Infusion Nursing Note:  Shaneka Alvarez presents today for C7 D1 trodelvy.    Patient seen by provider today: No   present during visit today: Not Applicable.    Intravenous Access:  Implanted Port.    Treatment Conditions:  Lab Results   Component Value Date    HGB 11.1 03/24/2021     Lab Results   Component Value Date    WBC 3.2 03/24/2021      Lab Results   Component Value Date    ANEU 2.1 03/24/2021     Lab Results   Component Value Date     03/24/2021      Lab Results   Component Value Date     03/24/2021                   Lab Results   Component Value Date    POTASSIUM 4.2 03/24/2021           Lab Results   Component Value Date    MAG 2.2 03/24/2021            Lab Results   Component Value Date    CR 0.66 03/24/2021                   Lab Results   Component Value Date    RAFAELA 8.6 03/24/2021                Lab Results   Component Value Date    BILITOTAL 0.2 03/24/2021           Lab Results   Component Value Date    ALBUMIN 3.3 03/24/2021                    Lab Results   Component Value Date    ALT 23 03/24/2021           Lab Results   Component Value Date    AST 16 03/24/2021       Magnesium 2.2 and phosphorus 3.1 today.      Post Infusion Assessment:  Patient tolerated infusion without incident.  Patient observed for 30 minutes post trodelvy per protocol.  Blood return noted pre and post infusion.  Site patent and intact, free from redness, edema or discomfort.  No evidence of extravasations.  Access discontinued per protocol.       Discharge Plan:   Patient discharged in stable condition accompanied by: self.  Departure Mode: Ambulatory.  Verbally reviewed next infusion on 3/31/21.    Keira Brooks RN

## 2021-03-25 ENCOUNTER — OFFICE VISIT (OUTPATIENT)
Dept: OPHTHALMOLOGY | Facility: CLINIC | Age: 61
End: 2021-03-25
Attending: OPHTHALMOLOGY
Payer: COMMERCIAL

## 2021-03-25 DIAGNOSIS — H30.23 INTERMEDIATE UVEITIS OF BOTH EYES: ICD-10-CM

## 2021-03-25 DIAGNOSIS — Z96.1 PSEUDOPHAKIA OF BOTH EYES: Primary | ICD-10-CM

## 2021-03-25 DIAGNOSIS — C50.919 METASTATIC BREAST CANCER: ICD-10-CM

## 2021-03-25 DIAGNOSIS — H35.373 EPIRETINAL MEMBRANE (ERM) OF BOTH EYES: ICD-10-CM

## 2021-03-25 PROCEDURE — G0463 HOSPITAL OUTPT CLINIC VISIT: HCPCS

## 2021-03-25 PROCEDURE — 92134 CPTRZ OPH DX IMG PST SGM RTA: CPT | Performed by: OPHTHALMOLOGY

## 2021-03-25 PROCEDURE — 99024 POSTOP FOLLOW-UP VISIT: CPT | Mod: GC | Performed by: OPHTHALMOLOGY

## 2021-03-25 PROCEDURE — 92015 DETERMINE REFRACTIVE STATE: CPT

## 2021-03-25 ASSESSMENT — VISUAL ACUITY
OS_SC: 20/80
METHOD: SNELLEN - LINEAR
OS_PH_SC: 20/40
OD_SC: 20/40

## 2021-03-25 ASSESSMENT — EXTERNAL EXAM - RIGHT EYE: OD_EXAM: NORMAL

## 2021-03-25 ASSESSMENT — CUP TO DISC RATIO
OD_RATIO: 0.4
OS_RATIO: 0.4

## 2021-03-25 ASSESSMENT — SLIT LAMP EXAM - LIDS
COMMENTS: NORMAL
COMMENTS: NORMAL

## 2021-03-25 ASSESSMENT — REFRACTION_MANIFEST
OS_SPHERE: -2.00
OD_ADD: +2.50
OS_CYLINDER: +0.25
OS_AXIS: 170
OD_CYLINDER: +1.00
OS_ADD: +2.50
OD_AXIS: 175
OD_SPHERE: -0.50

## 2021-03-25 ASSESSMENT — TONOMETRY
IOP_METHOD: TONOPEN
OD_IOP_MMHG: 20
OS_IOP_MMHG: 17

## 2021-03-25 ASSESSMENT — EXTERNAL EXAM - LEFT EYE: OS_EXAM: NORMAL

## 2021-03-25 NOTE — PROGRESS NOTES
Chief Complaint(s) and History of Present Illness(es)     Post Op (Ophthalmology) Left Eye     Laterality: both eyes    Onset: gradual    Onset: months ago    Quality: States the va is doing great    Associated symptoms: floaters.  Negative for dryness, redness and tearing      Pain scale: 0/10              Comments     CE/IOL/TORIC  Yusra Mancia COT 10:49 AM March 25, 2021         The patient notes improving vision since her cataract surgery. No eye pain, flashes, change in floaters. Tapering PF as directed.     Review of systems for the eyes was negative other than the pertinent positives/negatives listed in the HPI.      Assessment & Plan      Shaneka Alvarez is a 60 year old female with the following diagnoses:   1. Pseudophakia of both eyes    2. Epiretinal membrane (ERM) of both eyes    3. Intermediate uveitis of both eyes    4. Metastatic breast cancer (H)       S/p CEIOL right eye (3/8/21)  S/p CEIOL left eye (2/8/21)    Doing well, very happy with vision   OCT mac today without edema.  Limited acuity likely due to previous cystoid macular edema.  Epiretinal membranes look minimally significant at this time  Ok to resume normal activities  Taper Predforte as directed  Glasses prescription updated  Artificial tears as needed          Patient disposition:   Return in about 6 months (around 9/25/2021) for Recheck with Dr. Sexton; Filippo kyle .    Cherelle Ashton MD  Ophthalmology Resident, PGY-2    Attending Physician Attestation:  Complete documentation of historical and exam elements from today's encounter can be found in the full encounter summary report (not reduplicated in this progress note).  I personally obtained the chief complaint(s) and history of present illness.  I confirmed and edited as necessary the review of systems, past medical/surgical history, family history, social history, and examination findings as documented by others; and I examined the patient myself.  I personally reviewed the  relevant tests, images, and reports as documented above.  I formulated and edited as necessary the assessment and plan and discussed the findings and management plan with the patient and family. . - Luis Barkley MD

## 2021-03-25 NOTE — LETTER
3/25/2021       RE: Shaneka Alvarez  [address] [address] [facility] [address] [facility] [address]     Dear Colleague,    Thank you for referring your patient, Shaneka Alvarez, to the [facility] at [facility]. Please see a copy of my visit note below.    Chief Complaint(s) and History of Present Illness(es)     Post Op (Ophthalmology) Left Eye     Laterality: both eyes    Onset: gradual    Onset: months ago    Quality: States the va is doing great    Associated symptoms: floaters.  Negative for dryness, redness and tearing      Pain scale: 0/10              Comments     CE/IOL/TORIC  Yusra Mckibbon COT 10:49 AM March 25, 2021               Review of systems for the eyes was negative other than the pertinent positives/negatives listed in the HPI.      Assessment & Plan      Shaneka Alvarez is a 60 year old female with the following diagnoses:   1. Pseudophakia of both eyes               Patient disposition:   No follow-ups on file.    Cherelle Ashton MD  Ophthalmology Resident, PGY-2      Chief Complaint(s) and History of Present Illness(es)     Post Op (Ophthalmology) Left Eye     Laterality: both eyes    Onset: gradual    Onset: months ago    Quality: States the va is doing great    Associated symptoms: floaters.  Negative for dryness, redness and tearing      Pain scale: 0/10              Comments     CE/IOL/TORIC  Yusra Mckibbon COT 10:49 AM March 25, 2021         The patient notes improving vision since her cataract surgery. No eye pain, flashes, change in floaters. Tapering PF as directed.     Review of systems for the eyes was negative other than the pertinent positives/negatives listed in the HPI.      Assessment & Plan      Shaneka Alvarez is a 60 year old female with the following diagnoses:   1. Pseudophakia of both eyes    2. Epiretinal membrane (ERM) of both eyes    3. Intermediate uveitis of both eyes    4. Metastatic breast cancer (H)       S/p CEIOL  right eye (3/8/21)  S/p CEIOL left eye (2/8/21)    Doing well, very happy with vision   OCT mac today without edema.  Limited acuity likely due to previous cystoid macular edema.  Epiretinal membranes look minimally significant at this time  Ok to resume normal activities  Taper Predforte as directed  Glasses prescription updated  Artificial tears as needed          Patient disposition:   Return in about 6 months (around 9/25/2021) for Recheck with Dr. Sexton; Filippo kyle .    Cherelle Ashton MD  Ophthalmology Resident, PGY-2    Attending Physician Attestation:  Complete documentation of historical and exam elements from today's encounter can be found in the full encounter summary report (not reduplicated in this progress note).  I personally obtained the chief complaint(s) and history of present illness.  I confirmed and edited as necessary the review of systems, past medical/surgical history, family history, social history, and examination findings as documented by others; and I examined the patient myself.  I personally reviewed the relevant tests, images, and reports as documented above.  I formulated and edited as necessary the assessment and plan and discussed the findings and management plan with the patient and family. . - Luis Barkley MD         Again, thank you for allowing me to participate in the care of your patient.      Sincerely,    Luis Barkley MD

## 2021-03-25 NOTE — NURSING NOTE
Chief Complaints and History of Present Illnesses   Patient presents with     Post Op (Ophthalmology) Left Eye     Chief Complaint(s) and History of Present Illness(es)     Post Op (Ophthalmology) Left Eye     Laterality: both eyes    Onset: gradual    Onset: months ago    Quality: States the va is doing great    Associated symptoms: floaters.  Negative for dryness, redness and tearing    Pain scale: 0/10              Comments     CE/IOL/TORIC  Yusra Mancia COT 10:49 AM March 25, 2021

## 2021-03-27 NOTE — PROGRESS NOTES
Palliative Care Outpatient Clinic Progress Note    Patient Name: Shaneka Alvarez is being evaluated via a billable video visit.    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 Didn't tolerate Xeloda, paclitaxel Nov 2019-Jan 2020, treated with eribulin and zometa. PD seen on PET Nov 2020 as well as a new L 4th rib concerning for a pathologic fracture. Started Trodelvy 11/11/20   - admission Dec 2020 for presumed treatment side effects, continued with Trodelvy at reduced dose. Good response on PET Feb 2021  - b/l PE found on PET July 2020, on anticoagulation  - MS with mild right-sided weakness     Interim History:  At the last visit we discussed pain (cont morphine) nausea (scheduled zofran, cont prn compazin,lorazepam)    Patient is on the call by herself    She has been doing well, is tolerating the Trodelvy without significant issues.    She continues to have pain in her lower back, very recently noticed additional neck pain. She worked with PT with minor improvement in pain/function.  She takes 2 MSIR 15mg most afternoons, still is taking MS Contin 15mg at bedtime.     For the past few days she has noticed pain in her lower neck with tenderness over the one bony prominence. The pain doesn't radiate at baseline or when she applies pressure.   In the past few months she has developed mild, non-painful tingling in her fingertips and toes. The timing of this doesn't coincide with her neck pain. No change in the tingling with onset of  the neck pain. No UE weakness or other sensory changes.   She has imaging planned for early May. We discuss that she should immediately contact Dr Zepeda's team or us should she notice a sudden increase or change in the pain, weakness or sensory changes in her arms. She realizes that these would be concerning signs that there might be metastatic disease in her neck.     Coping:  She is in good spirits.     Social History:  Pertinent changes to social history/social situation since last visit: no changes    Key support resources:     Advance Directive Status:    POLST completed 20: DNR/selective treatments    Functional Status:  1 (Restricted in physically strenuous activity but ambulatory and able to carry out work of a light or sedentary nature)      Impression & Recommendations & Counseliny/o woman with metastatic breast cancer    Pain: adequately controlled with OME of 45. Depending on nature of her neck pain, will discuss buprenorphine at her next visit.     Return to clinic in 3 months    Data / Chart Review:    Review of Systems:   ROS: 10 point ROS neg other than the symptoms noted above in the HPI  and pertinents here.         Physical Exam:   Physical Exam:  Vital Signs not checked, virtual visit    CONSTIT: awake, appears comfortable  EENT: MMM, EOMI, no icterus  RESP: reg, nl effort, no cough  MSK: moves x4, SABINA  SKIN: no rash, no obvious lesions  NEURO: alert, oriented x3  PSYCH: appropriate affect, memory and thought process intact    The rest of a comprehensive physical examination is deferred  due to public health emergency video visit restrictions.        Current pertinent medications:  MS Contin 15mg HS  MSIR 15-30mg q3h prn              Naloxone prescribed  acetaminophen 1000mg tid  CBD cream     Dexamethasone 4mg q12h     Venlafaxine 225mg daily  Lorazepam 1mg prn  Trazodone 50mg HS  Buspirone 15mg bid     Olanzapine prn after chemo  Promethazine q6h prn  Senna bid prn, miralax bid  prn     : ok     Opioid safety assessment:   Expected prognosis >1 year  Risk: Low (ORT 0)  Indication for Opioid Use: Moderate or Strong  Baseline UDT performed on: not sent yet  Naloxone Script provided if patient also on benzodiazepine: 12/4/2019   Indications for Tapering reviewed: yes,3/26/20    : ok    Opioid safety assessment:   Expected prognosis >1 year  Risk: Low (ORT 0)  Indication for Opioid Use: Moderate or Strong  Baseline UDT performed on: not sent yet  Naloxone Script provided if patient also on benzodiazepine: 12/4/2019   Indications for Tapering reviewed: yes,3/26/20     No pertinent allergies      Lab and imaging data reviewed:  Comments:       Video-Visit Details    Type of service:  Video Visit    Physician has received verbal consent for a Video Visit from the patient? Yes    Video Start Time: 1000    Video End Time (time video stopped): 1021    Originating Location (pt. Location): Home    Distant Location (provider location):  [facility]     Mode of Communication:  Video Conference via [facility]    Alva Whitaker MD  Palliative Medicine  Pager [phone]

## 2021-03-29 ENCOUNTER — VIRTUAL VISIT (OUTPATIENT)
Dept: ONCOLOGY | Facility: CLINIC | Age: 61
End: 2021-03-29
Payer: COMMERCIAL

## 2021-03-29 VITALS — BODY MASS INDEX: 24.48 KG/M2 | WEIGHT: 133 LBS | HEIGHT: 62 IN

## 2021-03-29 DIAGNOSIS — C50.911 BILATERAL MALIGNANT NEOPLASM OF BREAST IN FEMALE, UNSPECIFIED ESTROGEN RECEPTOR STATUS, UNSPECIFIED SITE OF BREAST (H): Primary | ICD-10-CM

## 2021-03-29 DIAGNOSIS — C50.912 BILATERAL MALIGNANT NEOPLASM OF BREAST IN FEMALE, UNSPECIFIED ESTROGEN RECEPTOR STATUS, UNSPECIFIED SITE OF BREAST (H): Primary | ICD-10-CM

## 2021-03-29 PROCEDURE — 99214 OFFICE O/P EST MOD 30 MIN: CPT | Mod: GT | Performed by: HOSPITALIST

## 2021-03-29 ASSESSMENT — MIFFLIN-ST. JEOR: SCORE: 1136.53

## 2021-03-29 NOTE — NURSING NOTE
Shaneka is a 60 year old who is being evaluated via a billable video visit.      How would you like to obtain your AVS? MyChart  If the video visit is dropped, the invitation should be resent by: Text to cell phone: [phone]  Will anyone else be joining your video visit? No     Video-Visit Details    Type of service:  Video Visit    Originating Location (pt. Location): Home    Distant Location (provider location):  [facility]     Platform used for Video Visit: [employer]     SYMPTOM QUESTIONNAIRE    Pain: YES, 3/10 neck pain    Nausea/Vomiting: no    Mouth Sores: no    Shortness of Breath: no    Smoking: no    Fever or Chills: no    Hard Stools: no    Loose Stools: no    Weight Loss: no    Weakness: no    Burning, numbness or tingling in hands or feet: YES, fingers and toes    Problems with skin or swelling: no    Memory Loss: no    Anxiety or Depression: no    Trouble Sleeping: no    No Concerns    Rocío James CMA

## 2021-03-29 NOTE — LETTER
3/29/2021         RE: Shaneka Alvarez  [address] [address] [facility] [address]        Dear Colleague,    Thank you for referring your patient, Shaneka Alvarez, to the [facility]CER [facility]. Please see a copy of my visit note below.    Palliative Care Outpatient Clinic Progress Note    Patient Name: Shaneka Alvarez is being evaluated via a billable video visit.    Primary Provider:  Clinic, [facility]  Primary Oncologist: Dr Zepeda  Last seen by palliative care: myself, 11/30/20, missed 2/1/21      Chief Complaint: mild new neck pain    Medical History/Summary:  - breast cancer ER+/PA+, Her2 neg diagnosed in 2006              - s/p systemic treatment with adriamycin, cytoxan, avastin, aromatase inhibitor as well as b/l mastectomy              - relapsed metastatic disease found in skull, vertebrae complicated by pathological fractures L1, L5              - s/p XRT to T12-L5 Sept 2019. Didn't tolerate Xeloda, paclitaxel Nov 2019-Jan 2020, treated with eribulin and zometa. PD seen on PET Nov 2020 as well as a new L 4th rib concerning for a pathologic fracture. Started Trodelvy 11/11/20   - admission Dec 2020 for presumed treatment side effects, continued with Trodelvy at reduced dose. Good response on PET Feb 2021  - b/l PE found on PET July 2020, on anticoagulation  - MS with mild right-sided weakness     Interim History:  At the last visit we discussed pain (cont morphine) nausea (scheduled zofran, cont prn compazin,lorazepam)    Patient is on the call by herself    She has been doing well, is tolerating the Trodelvy without significant issues.    She continues to have pain in her lower back, very recently noticed additional neck pain. She worked with PT with minor improvement in pain/function.  She takes 2 MSIR 15mg most afternoons, still is taking MS Contin 15mg at bedtime.     For the past few days she has noticed pain in her lower neck with tenderness over the one bony  prominence. The pain doesn't radiate at baseline or when she applies pressure.   In the past few months she has developed mild, non-painful tingling in her fingertips and toes. The timing of this doesn't coincide with her neck pain. No change in the tingling with onset of the neck pain. No UE weakness or other sensory changes.   She has imaging planned for early May. We discuss that she should immediately contact Dr Zepeda's team or us should she notice a sudden increase or change in the pain, weakness or sensory changes in her arms. She realizes that these would be concerning signs that there might be metastatic disease in her neck.     Coping:  She is in good spirits.     Social History:  Pertinent changes to social history/social situation since last visit: no changes    Key support resources:     Advance Directive Status:    POLST completed 20: DNR/selective treatments    Functional Status:  1 (Restricted in physically strenuous activity but ambulatory and able to carry out work of a light or sedentary nature)      Impression & Recommendations & Counseliny/o woman with metastatic breast cancer    Pain: adequately controlled with OME of 45. Depending on nature of her neck pain, will discuss buprenorphine at her next visit.     Return to clinic in 3 months    Data / Chart Review:    Review of Systems:   ROS: 10 point ROS neg other than the symptoms noted above in the HPI  and pertinents here.         Physical Exam:   Physical Exam:  Vital Signs not checked, virtual visit    CONSTIT: awake, appears comfortable  EENT: MMM, EOMI, no icterus  RESP: reg, nl effort, no cough  MSK: moves x4, SABINA  SKIN: no rash, no obvious lesions  NEURO: alert, oriented x3  PSYCH: appropriate affect, memory and thought process intact    The rest of a comprehensive physical examination is deferred  due to public health emergency video visit restrictions.        Current pertinent medications:  MS Contin 15mg HS  MSIR  15-30mg q3h prn              Naloxone prescribed  acetaminophen 1000mg tid  CBD cream     Dexamethasone 4mg q12h     Venlafaxine 225mg daily  Lorazepam 1mg prn  Trazodone 50mg HS  Buspirone 15mg bid     Olanzapine prn after chemo  Promethazine q6h prn  Senna bid prn, miralax bid prn     : ok     Opioid safety assessment:   Expected prognosis >1 year  Risk: Low (ORT 0)  Indication for Opioid Use: Moderate or Strong  Baseline UDT performed on: not sent yet  Naloxone Script provided if patient also on benzodiazepine: 12/4/2019   Indications for Tapering reviewed: yes,3/26/20    : ok    Opioid safety assessment:   Expected prognosis >1 year  Risk: Low (ORT 0)  Indication for Opioid Use: Moderate or Strong  Baseline UDT performed on: not sent yet  Naloxone Script provided if patient also on benzodiazepine: 12/4/2019   Indications for Tapering reviewed: yes,3/26/20     No pertinent allergies      Lab and imaging data reviewed:  Comments:       Video-Visit Details    Type of service:  Video Visit    Physician has received verbal consent for a Video Visit from the patient? Yes    Video Start Time: 1000    Video End Time (time video stopped): 1021    Originating Location (pt. Location): Home    Distant Location (provider location):  [facility]     Mode of Communication:  Video Conference via [employer]    Alva Whitaker MD  Palliative Medicine  Pager [phone]        Again, thank you for allowing me to participate in the care of your patient.        Sincerely,        Alva Whitaker MD

## 2021-03-31 ENCOUNTER — INFUSION THERAPY VISIT (OUTPATIENT)
Dept: INFUSION THERAPY | Facility: CLINIC | Age: 61
End: 2021-03-31
Payer: COMMERCIAL

## 2021-03-31 VITALS
SYSTOLIC BLOOD PRESSURE: 110 MMHG | OXYGEN SATURATION: 99 % | WEIGHT: 137.3 LBS | RESPIRATION RATE: 18 BRPM | DIASTOLIC BLOOD PRESSURE: 71 MMHG | BODY MASS INDEX: 25.11 KG/M2 | TEMPERATURE: 98.3 F | HEART RATE: 78 BPM

## 2021-03-31 DIAGNOSIS — D70.1 CHEMOTHERAPY-INDUCED NEUTROPENIA (H): ICD-10-CM

## 2021-03-31 DIAGNOSIS — C50.911 BILATERAL MALIGNANT NEOPLASM OF BREAST IN FEMALE, UNSPECIFIED ESTROGEN RECEPTOR STATUS, UNSPECIFIED SITE OF BREAST (H): ICD-10-CM

## 2021-03-31 DIAGNOSIS — C50.912 BILATERAL MALIGNANT NEOPLASM OF BREAST IN FEMALE, UNSPECIFIED ESTROGEN RECEPTOR STATUS, UNSPECIFIED SITE OF BREAST (H): ICD-10-CM

## 2021-03-31 DIAGNOSIS — T45.1X5A CHEMOTHERAPY-INDUCED NEUTROPENIA (H): ICD-10-CM

## 2021-03-31 DIAGNOSIS — C50.919 METASTATIC BREAST CANCER: ICD-10-CM

## 2021-03-31 DIAGNOSIS — E87.6 HYPOKALEMIA: Primary | ICD-10-CM

## 2021-03-31 LAB
ALBUMIN SERPL-MCNC: 3.2 G/DL (ref 3.4–5)
ALP SERPL-CCNC: 71 U/L (ref 40–150)
ALT SERPL W P-5'-P-CCNC: 26 U/L (ref 0–50)
ANION GAP SERPL CALCULATED.3IONS-SCNC: 4 MMOL/L (ref 3–14)
AST SERPL W P-5'-P-CCNC: 18 U/L (ref 0–45)
BASOPHILS # BLD AUTO: 0 10E9/L (ref 0–0.2)
BASOPHILS NFR BLD AUTO: 1.4 %
BILIRUB SERPL-MCNC: 0.3 MG/DL (ref 0.2–1.3)
BUN SERPL-MCNC: 10 MG/DL (ref 7–30)
CALCIUM SERPL-MCNC: 8.2 MG/DL (ref 8.5–10.1)
CHLORIDE SERPL-SCNC: 106 MMOL/L (ref 94–109)
CO2 SERPL-SCNC: 28 MMOL/L (ref 20–32)
CREAT SERPL-MCNC: 0.72 MG/DL (ref 0.52–1.04)
DIFFERENTIAL METHOD BLD: ABNORMAL
EOSINOPHIL # BLD AUTO: 0.1 10E9/L (ref 0–0.7)
EOSINOPHIL NFR BLD AUTO: 3.9 %
ERYTHROCYTE [DISTWIDTH] IN BLOOD BY AUTOMATED COUNT: 15.2 % (ref 10–15)
GFR SERPL CREATININE-BSD FRML MDRD: >90 ML/MIN/{1.73_M2}
GLUCOSE SERPL-MCNC: 101 MG/DL (ref 70–99)
HCT VFR BLD AUTO: 34.7 % (ref 35–47)
HGB BLD-MCNC: 11.2 G/DL (ref 11.7–15.7)
IMM GRANULOCYTES # BLD: 0 10E9/L (ref 0–0.4)
IMM GRANULOCYTES NFR BLD: 1.4 %
LYMPHOCYTES # BLD AUTO: 0.7 10E9/L (ref 0.8–5.3)
LYMPHOCYTES NFR BLD AUTO: 26 %
MAGNESIUM SERPL-MCNC: 1.8 MG/DL (ref 1.6–2.3)
MCH RBC QN AUTO: 28.9 PG (ref 26.5–33)
MCHC RBC AUTO-ENTMCNC: 32.3 G/DL (ref 31.5–36.5)
MCV RBC AUTO: 90 FL (ref 78–100)
MONOCYTES # BLD AUTO: 0.4 10E9/L (ref 0–1.3)
MONOCYTES NFR BLD AUTO: 12.5 %
NEUTROPHILS # BLD AUTO: 1.5 10E9/L (ref 1.6–8.3)
NEUTROPHILS NFR BLD AUTO: 54.8 %
PHOSPHATE SERPL-MCNC: 2.7 MG/DL (ref 2.5–4.5)
PLATELET # BLD AUTO: 190 10E9/L (ref 150–450)
POTASSIUM SERPL-SCNC: 3.8 MMOL/L (ref 3.4–5.3)
PROT SERPL-MCNC: 6.2 G/DL (ref 6.8–8.8)
RBC # BLD AUTO: 3.87 10E12/L (ref 3.8–5.2)
SODIUM SERPL-SCNC: 138 MMOL/L (ref 133–144)
WBC # BLD AUTO: 2.8 10E9/L (ref 4–11)

## 2021-03-31 PROCEDURE — 85025 COMPLETE CBC W/AUTO DIFF WBC: CPT | Performed by: INTERNAL MEDICINE

## 2021-03-31 PROCEDURE — 96375 TX/PRO/DX INJ NEW DRUG ADDON: CPT | Performed by: NURSE PRACTITIONER

## 2021-03-31 PROCEDURE — 83735 ASSAY OF MAGNESIUM: CPT | Performed by: INTERNAL MEDICINE

## 2021-03-31 PROCEDURE — 99207 PR NO CHARGE NURSE ONLY: CPT

## 2021-03-31 PROCEDURE — 84100 ASSAY OF PHOSPHORUS: CPT | Performed by: INTERNAL MEDICINE

## 2021-03-31 PROCEDURE — 96367 TX/PROPH/DG ADDL SEQ IV INF: CPT | Performed by: NURSE PRACTITIONER

## 2021-03-31 PROCEDURE — 99207 PR NO CHARGE LOS: CPT

## 2021-03-31 PROCEDURE — S0028 INJECTION, FAMOTIDINE, 20 MG: HCPCS | Performed by: NURSE PRACTITIONER

## 2021-03-31 PROCEDURE — 96413 CHEMO IV INFUSION 1 HR: CPT | Performed by: NURSE PRACTITIONER

## 2021-03-31 PROCEDURE — 80053 COMPREHEN METABOLIC PANEL: CPT | Performed by: INTERNAL MEDICINE

## 2021-03-31 RX ORDER — HEPARIN SODIUM (PORCINE) LOCK FLUSH IV SOLN 100 UNIT/ML 100 UNIT/ML
500 SOLUTION INTRAVENOUS ONCE
Status: COMPLETED | OUTPATIENT
Start: 2021-03-31 | End: 2021-03-31

## 2021-03-31 RX ORDER — HEPARIN SODIUM (PORCINE) LOCK FLUSH IV SOLN 100 UNIT/ML 100 UNIT/ML
5 SOLUTION INTRAVENOUS
Status: DISCONTINUED | OUTPATIENT
Start: 2021-03-31 | End: 2021-03-31 | Stop reason: HOSPADM

## 2021-03-31 RX ORDER — DIPHENHYDRAMINE HCL 25 MG
50 CAPSULE ORAL ONCE
Status: COMPLETED | OUTPATIENT
Start: 2021-03-31 | End: 2021-03-31

## 2021-03-31 RX ORDER — ACETAMINOPHEN 325 MG/1
650 TABLET ORAL ONCE
Status: COMPLETED | OUTPATIENT
Start: 2021-03-31 | End: 2021-03-31

## 2021-03-31 RX ADMIN — Medication 50 MG: at 13:17

## 2021-03-31 RX ADMIN — HEPARIN SODIUM (PORCINE) LOCK FLUSH IV SOLN 100 UNIT/ML 5 ML: 100 SOLUTION at 15:40

## 2021-03-31 RX ADMIN — Medication 500 ML: at 13:23

## 2021-03-31 RX ADMIN — ACETAMINOPHEN 650 MG: 325 TABLET ORAL at 13:17

## 2021-03-31 RX ADMIN — HEPARIN SODIUM (PORCINE) LOCK FLUSH IV SOLN 100 UNIT/ML 500 UNITS: 100 SOLUTION at 12:35

## 2021-03-31 ASSESSMENT — PAIN SCALES - GENERAL: PAINLEVEL: NO PAIN (0)

## 2021-03-31 NOTE — PROGRESS NOTES
Infusion Nursing Note:  Shaneka Alvarez presents today for C7,D8 of Trodelvy    Patient seen by provider today: No   present during visit today: Not Applicable.    Note: Pt states she is doing well, denies any problems with her last infusion - states she would like to oral benadryl.  Patient did not meet criteria for an asymptomatic covid-19 PCR test in infusion today. Patient declined the covid-19 test. Pt has had her first COVID vaccine on 03/15/2021 and will be 04/15/2021 for her 2nd dose.    Intravenous Access:  Implanted Port.    Treatment Conditions:  Lab Results   Component Value Date    HGB 11.2 03/31/2021     Lab Results   Component Value Date    WBC 2.8 03/31/2021      Lab Results   Component Value Date    ANEU 1.5 03/31/2021     Lab Results   Component Value Date     03/31/2021      Lab Results   Component Value Date     03/31/2021                   Lab Results   Component Value Date    POTASSIUM 3.8 03/31/2021           Lab Results   Component Value Date    MAG 1.8 03/31/2021            Lab Results   Component Value Date    CR 0.72 03/31/2021                   Lab Results   Component Value Date    RAFAELA 8.2 03/31/2021                Lab Results   Component Value Date    BILITOTAL 0.3 03/31/2021           Lab Results   Component Value Date    ALBUMIN 3.2 03/31/2021                    Lab Results   Component Value Date    ALT 26 03/31/2021           Lab Results   Component Value Date    AST 18 03/31/2021       Results reviewed, labs MET treatment parameters, ok to proceed with treatment.      Post Infusion Assessment:  Patient tolerated infusion without incident.  Blood return noted pre and post infusion.  Site patent and intact, free from redness, edema or discomfort.  No evidence of extravasations.  Access discontinued per protocol.       Discharge Plan:   Return 04/12/2021 video visit with Amee Dia NP and 04/14/2021 for C8 of Todelvy.  Discharge instructions reviewed with:  Patient.  Patient and/or family verbalized understanding of discharge instructions and all questions answered.  Patient discharged in stable condition accompanied by: self.  Departure Mode: Ambulatory.    Loren Costa RN

## 2021-04-05 ENCOUNTER — IMMUNIZATION (OUTPATIENT)
Dept: PEDIATRICS | Facility: CLINIC | Age: 61
End: 2021-04-05
Attending: INTERNAL MEDICINE
Payer: COMMERCIAL

## 2021-04-05 PROCEDURE — 91300 PR COVID VAC PFIZER DIL RECON 30 MCG/0.3 ML IM: CPT

## 2021-04-05 PROCEDURE — 0002A PR COVID VAC PFIZER DIL RECON 30 MCG/0.3 ML IM: CPT

## 2021-04-11 NOTE — PROGRESS NOTES
Heme/onc Video visit note  April 12, 2021  Oncology team: Dr. Zepeda    Due to the concerns around COVID-19 and adhering to social distancing we conducted this visit via video visit.     Reason for visit: follow-up metastatic breast cancer, assessment prior to cycle 8 day 1 on 4/14    Cancer history: Shaneka Alvarez is a 60 year old female with triple negative metastatic breast cancer to the bone. Please see Dr. Zepeda's note for previous treatment history. Most recently she is on Trodelvy (sacituzumab govitecan- an antibody-drug conjugate) since November 2020. The dose has been reduced 25% due to neutropenia.  Last scan was 2/5/2021 and showed mainly stable disease (a single lesion in right iliac bone had slightly increased uptake).     Interval history: Doing overall well.     No issues with neutropenia or recent fevers, chills or infection.     Occasional nausea. Takes Zyprexa prn with relief. No d/c.     Fatigue is mild and stable.  She has anorexia and takes boost/ensure, weight stable. + alopecia    Pain is well controlled with current regimen. Needs refills.     12 pt ROS otherwise negative.    Past medical history:  Patient Active Problem List   Diagnosis     Neurogenic bladder     Vision changes     Demyelinating disorder (H)     Multiple sclerosis (H)     CARDIOVASCULAR SCREENING; LDL GOAL LESS THAN 160     Raynaud's syndrome     Breast cancer (H)     Complication of anesthesia     Arthritis     Autoimmune disorder (H)     Other insomnia     Medication management contract agreement     Migraine without aura and without status migrainosus, not intractable     Obesity, Class II, BMI 35-39.9     Anxiety     Major depressive disorder, recurrent episode, moderate (H)     Anxiety and depression     GERD (gastroesophageal reflux disease)     History of breast cancer     Hx of breast cancer     Migraine     Weakness     S/P breast reconstruction, bilateral     Metastatic disease (H)     Metastatic breast cancer (H)      Bone metastases (H)     Hypokalemia     Pulmonary embolism without acute cor pulmonale, unspecified chronicity, unspecified pulmonary embolism type (H)     Chemotherapy-induced neutropenia (H)     Dehydration     Chemotherapy induced nausea and vomiting     Sinus tachycardia     Non-intractable vomiting with nausea, unspecified vomiting type     Posterior subcapsular polar age-related cataract of both eyes     Iris synechiae, bilateral     Drug-induced polyneuropathy (H)       Medications:  acetaminophen (TYLENOL) 500 MG tablet, Take 2 tablets (1,000 mg) by mouth 3 times daily (Patient taking differently: Take 1,000 mg by mouth every 8 hours as needed )  apixaban ANTICOAGULANT (ELIQUIS) 5 MG tablet, Take 1 tablet (5 mg) by mouth 2 times daily  busPIRone (BUSPAR) 15 MG tablet, Take 1 tablet (15 mg) by mouth 2 times daily  calcium citrate-vitamin D (CITRACAL) 315-250 MG-UNIT TABS, Take 1 tablet by mouth every morning   Cholecalciferol (VITAMIN D3 PO), Take 2,000 Units by mouth every morning   HEMP OIL OR EXTRACT OR OTHER CBD CANNABINOID, NOT MEDICAL CANNABIS,, CBD cream  loperamide (IMODIUM) 2 MG capsule, Start with 2 caps (4 mg), then take one cap (2 mg) after each diarrheal stool as needed. Do not use more than 8 caps (16 mg) per day. (Patient taking differently: as needed Start with 2 caps (4 mg), then take one cap (2 mg) after each diarrheal stool as needed. Do not use more than 8 caps (16 mg) per day.)  LORazepam (ATIVAN) 1 MG tablet, Take 1 tablet (1 mg) by mouth every 4 hours as needed for anxiety or nausea  magnesium 250 MG tablet, Take 1 tablet by mouth every morning   morphine (MS CONTIN) 15 MG CR tablet, Take 1 tablet (15 mg) by mouth every evening  morphine (MSIR) 15 MG IR tablet, Take 15-30 mg by mouth every 3 hours as needed   moxifloxacin (VIGAMOX) 0.5 % ophthalmic solution, Apply 1 drop to eye 3 times daily To operative eye  OLANZapine (ZYPREXA) 5 MG tablet, Take one tablet, starting the first day  after chemotherapy for 5 days, then stop. Repeat for each chemotherapy cycle for nausea, vomiting.  potassium & sodium phosphates (POTASSIUM & SODIUM PHOSPHATES) 280-160-250 MG Packet, Take 1 packet by mouth 4 times daily  potassium chloride ER (KLOR-CON M) 10 MEQ CR tablet, Take 1 tablet (10 mEq) by mouth 3 times daily (Patient taking differently: Take 10 mEq by mouth 2 times daily )  prednisoLONE acetate (PRED FORTE) 1 % ophthalmic suspension, Apply 1 drop to eye 4 times daily To operative eye  prednisoLONE acetate (PRED FORTE) 1 % ophthalmic suspension, Apply 1 drop to eye 4 times daily To operative eye  promethazine (PHENERGAN) 25 MG tablet, Take 25 mg by mouth every 6 hours as needed  propranolol ER (INDERAL LA) 80 MG 24 hr capsule, Take 1 capsule (80 mg) by mouth every morning  senna-docusate (SENOKOT-S/PERICOLACE) 8.6-50 MG tablet, Take 1-2 tablets by mouth 2 times daily as needed for constipation . Goal is to have a bowel movement every 1-2 days. (Patient taking differently: Take 1-2 tablets by mouth as needed for constipation . Goal is to have a bowel movement every 1-2 days.)  traZODone (DESYREL) 50 MG tablet, Take 1 tablet (50 mg) by mouth At Bedtime  venlafaxine (EFFEXOR-XR) 75 MG 24 hr capsule, Take 3 capsules (225 mg) by mouth every morning  [DISCONTINUED] capecitabine (XELODA) 500 MG tablet CHEMO, Take 4 tabs ( 2000mg ) in the morning and 3 tabs (1500mg ) in the evening. Days 1 through 14, then off for 7 days. (Patient not taking: Reported on 10/7/2019)    No current facility-administered medications on file prior to visit.       Allergy:     Allergies   Allergen Reactions     Bactrim [Sulfamethoxazole W/Trimethoprim]      Internal bleeding     Copaxone [Glatiramer] Hives       Video physical exam  General: Patient appears well in no acute distress. Normal body habitus.  Skin: No visualized rash or lesions on visualized skin  Eyes: EOMI, no erythema, sclera icterus or discharge noted  Resp: Appears to  be breathing comfortably without accessory muscle usage, speaking in full sentences, no cough  MSK: Appears to have normal range of motion based on visualized movements  Neurologic: No apparent tremors, facial movements symmetric  Psych: affect versatile, alert and oriented    The rest of a comprehensive physical examination is deferred due to PHE (public health emergency) video restrictions    Labs:  none    Imaging:    Assessment and plan:  Shaneka Alvarez is a 60 year old female with metastatic triple negative breast cancer who has received several lines of therapy. Disease is in the bone. She is currently on Trodelvy, d1 and d8 in 21 day cycle, since November 2020. The dose has been reduced 25% for neutropenia.     1. Breast cancer with bone metastasis  -Next cycle due 4/14  -Monthly Xgeva, due 4/14  -Continue calcium and vitamin D    2. Nausea/emesis  -Continue with Emend Decadron and Zofran.  Nausea is under good control.  She takes Compazine or Zyprexa as needed. Gets migraines with oral Zofran.     3. Cancer related pain, controlled  -Pain is under decent control with morphine sulfate immediate release (0-1 a day) and MS Contin 15 mg in PM.  She is following with palliative care. Refilled today    4. Leukopenia/Neutropenia.  Trodelvy dose reduction of 25% starting cycle #3.    She has mild leukopenia with a normal to slightly low neutrophil count.  Continue to observe.       5. Anemia.  Related to chemotherapy.  Last Hemoglobin 11.2 g/dl.  Continue to serially monitor.       6. Bilateral pulmonary emboli.  Continue Eliquis for incidentally found PE on 7/17/2020.      7. Neuropathy.  Stable mild neuropathy in the hands. Continue to observe.      8. Insomnia.  Continue trazodone. Refilled today    The total time of this encounter amounted to 30 minutes.This time included face to face time spent with the patient, prep work, ordering tests, and performing post visit documentation.    Amee Dia PA-C    Video  start time: 10:00 am Stop time 10:15am  [employer] via [employer]

## 2021-04-12 ENCOUNTER — VIRTUAL VISIT (OUTPATIENT)
Dept: ONCOLOGY | Facility: CLINIC | Age: 61
End: 2021-04-12
Payer: COMMERCIAL

## 2021-04-12 VITALS — WEIGHT: 137 LBS | BODY MASS INDEX: 25.21 KG/M2 | HEIGHT: 62 IN

## 2021-04-12 DIAGNOSIS — C50.912 BILATERAL MALIGNANT NEOPLASM OF BREAST IN FEMALE, UNSPECIFIED ESTROGEN RECEPTOR STATUS, UNSPECIFIED SITE OF BREAST (H): ICD-10-CM

## 2021-04-12 DIAGNOSIS — E87.6 HYPOKALEMIA: ICD-10-CM

## 2021-04-12 DIAGNOSIS — G47.00 INSOMNIA, UNSPECIFIED TYPE: ICD-10-CM

## 2021-04-12 DIAGNOSIS — G89.3 CANCER ASSOCIATED PAIN: ICD-10-CM

## 2021-04-12 DIAGNOSIS — D70.1 CHEMOTHERAPY-INDUCED NEUTROPENIA (H): Primary | ICD-10-CM

## 2021-04-12 DIAGNOSIS — C50.919 METASTATIC BREAST CANCER: ICD-10-CM

## 2021-04-12 DIAGNOSIS — T45.1X5A CHEMOTHERAPY-INDUCED NEUTROPENIA (H): Primary | ICD-10-CM

## 2021-04-12 DIAGNOSIS — C50.911 BILATERAL MALIGNANT NEOPLASM OF BREAST IN FEMALE, UNSPECIFIED ESTROGEN RECEPTOR STATUS, UNSPECIFIED SITE OF BREAST (H): ICD-10-CM

## 2021-04-12 PROCEDURE — 99214 OFFICE O/P EST MOD 30 MIN: CPT | Mod: GT | Performed by: PHYSICIAN ASSISTANT

## 2021-04-12 RX ORDER — DIPHENHYDRAMINE HCL 25 MG
50 CAPSULE ORAL ONCE
Status: CANCELLED | OUTPATIENT
Start: 2021-04-21

## 2021-04-12 RX ORDER — LORAZEPAM 2 MG/ML
0.5 INJECTION INTRAMUSCULAR EVERY 4 HOURS PRN
Status: CANCELLED
Start: 2021-04-21

## 2021-04-12 RX ORDER — DIPHENHYDRAMINE HYDROCHLORIDE 50 MG/ML
50 INJECTION INTRAMUSCULAR; INTRAVENOUS
Status: CANCELLED
Start: 2021-04-21

## 2021-04-12 RX ORDER — MORPHINE SULFATE 15 MG/1
15-30 TABLET ORAL
Qty: 60 TABLET | Refills: 0 | Status: SHIPPED | OUTPATIENT
Start: 2021-04-12 | End: 2021-05-11

## 2021-04-12 RX ORDER — ACETAMINOPHEN 325 MG/1
650 TABLET ORAL ONCE
Status: CANCELLED | OUTPATIENT
Start: 2021-04-21

## 2021-04-12 RX ORDER — SODIUM CHLORIDE 9 MG/ML
1000 INJECTION, SOLUTION INTRAVENOUS CONTINUOUS PRN
Status: CANCELLED
Start: 2021-04-21

## 2021-04-12 RX ORDER — HEPARIN SODIUM,PORCINE 10 UNIT/ML
5 VIAL (ML) INTRAVENOUS
Status: CANCELLED | OUTPATIENT
Start: 2021-04-21

## 2021-04-12 RX ORDER — MORPHINE SULFATE 15 MG/1
15 TABLET, FILM COATED, EXTENDED RELEASE ORAL EVERY EVENING
Qty: 30 TABLET | Refills: 0 | Status: SHIPPED | OUTPATIENT
Start: 2021-04-12 | End: 2021-05-19

## 2021-04-12 RX ORDER — OLANZAPINE 5 MG/1
5 TABLET ORAL
COMMUNITY
Start: 2021-04-12 | End: 2021-04-14

## 2021-04-12 RX ORDER — ALBUTEROL SULFATE 0.83 MG/ML
2.5 SOLUTION RESPIRATORY (INHALATION)
Status: CANCELLED | OUTPATIENT
Start: 2021-04-21

## 2021-04-12 RX ORDER — ATROPINE SULFATE 0.4 MG/ML
0.4 AMPUL (ML) INJECTION
Status: CANCELLED | OUTPATIENT
Start: 2021-04-21

## 2021-04-12 RX ORDER — NALOXONE HYDROCHLORIDE 0.4 MG/ML
.1-.4 INJECTION, SOLUTION INTRAMUSCULAR; INTRAVENOUS; SUBCUTANEOUS
Status: CANCELLED | OUTPATIENT
Start: 2021-04-21

## 2021-04-12 RX ORDER — TRAZODONE HYDROCHLORIDE 50 MG/1
50 TABLET, FILM COATED ORAL AT BEDTIME
Qty: 30 TABLET | Refills: 1 | Status: SHIPPED | OUTPATIENT
Start: 2021-04-12 | End: 2021-07-01

## 2021-04-12 RX ORDER — HEPARIN SODIUM (PORCINE) LOCK FLUSH IV SOLN 100 UNIT/ML 100 UNIT/ML
5 SOLUTION INTRAVENOUS
Status: CANCELLED | OUTPATIENT
Start: 2021-04-21

## 2021-04-12 RX ORDER — EPINEPHRINE 1 MG/ML
0.3 INJECTION, SOLUTION INTRAMUSCULAR; SUBCUTANEOUS EVERY 5 MIN PRN
Status: CANCELLED | OUTPATIENT
Start: 2021-04-21

## 2021-04-12 RX ORDER — METHYLPREDNISOLONE SODIUM SUCCINATE 125 MG/2ML
125 INJECTION, POWDER, LYOPHILIZED, FOR SOLUTION INTRAMUSCULAR; INTRAVENOUS
Status: CANCELLED
Start: 2021-04-21

## 2021-04-12 RX ORDER — MEPERIDINE HYDROCHLORIDE 25 MG/ML
25 INJECTION INTRAMUSCULAR; INTRAVENOUS; SUBCUTANEOUS EVERY 30 MIN PRN
Status: CANCELLED | OUTPATIENT
Start: 2021-04-21

## 2021-04-12 RX ORDER — ALBUTEROL SULFATE 90 UG/1
1-2 AEROSOL, METERED RESPIRATORY (INHALATION)
Status: CANCELLED
Start: 2021-04-21

## 2021-04-12 ASSESSMENT — MIFFLIN-ST. JEOR: SCORE: 1154.68

## 2021-04-12 ASSESSMENT — PAIN SCALES - GENERAL: PAINLEVEL: NO PAIN (0)

## 2021-04-12 NOTE — LETTER
4/12/2021         RE: Shaneka Alvarez  [address] [facility] [address]      Heme/onc Video visit note  April 12, 2021  Oncology team: Dr. Zepeda    Due to the concerns around COVID-19 and adhering to social distancing we conducted this visit via video visit.     Reason for visit: follow-up metastatic breast cancer, assessment prior to cycle 8 day 1 on 4/14    Cancer history: Shaneka Alvarez is a 60 year old female with triple negative metastatic breast cancer to the bone. Please see Dr. Zepeda's note for previous treatment history. Most recently she is on Trodelvy (sacituzumab govitecan- an antibody-drug conjugate) since November 2020. The dose has been reduced 25% due to neutropenia.  Last scan was 2/5/2021 and showed mainly stable disease (a single lesion in right iliac bone had slightly increased uptake).     Interval history: Doing overall well.     No issues with neutropenia or recent fevers, chills or infection.     Occasional nausea. Takes Zyprexa prn with relief. No d/c.     Fatigue is mild and stable.  She has anorexia and takes boost/ensure, weight stable. + alopecia    Pain is well controlled with current regimen. Needs refills.     12 pt ROS otherwise negative.    Past medical history:  Patient Active Problem List   Diagnosis     Neurogenic bladder     Vision changes     Demyelinating disorder (H)     Multiple sclerosis (H)     CARDIOVASCULAR SCREENING; LDL GOAL LESS THAN 160     Raynaud's syndrome     Breast cancer (H)     Complication of anesthesia     Arthritis     Autoimmune disorder (H)     Other insomnia     Medication management contract agreement     Migraine without aura and without status migrainosus, not intractable     Obesity, Class II, BMI 35-39.9     Anxiety     Major depressive disorder, recurrent episode, moderate (H)     Anxiety and depression     GERD (gastroesophageal reflux disease)     History of breast cancer     Hx of breast cancer     Migraine     Weakness     S/P  breast reconstruction, bilateral     Metastatic disease (H)     Metastatic breast cancer (H)     Bone metastases (H)     Hypokalemia     Pulmonary embolism without acute cor pulmonale, unspecified chronicity, unspecified pulmonary embolism type (H)     Chemotherapy-induced neutropenia (H)     Dehydration     Chemotherapy induced nausea and vomiting     Sinus tachycardia     Non-intractable vomiting with nausea, unspecified vomiting type     Posterior subcapsular polar age-related cataract of both eyes     Iris synechiae, bilateral     Drug-induced polyneuropathy (H)       Medications:  acetaminophen (TYLENOL) 500 MG tablet, Take 2 tablets (1,000 mg) by mouth 3 times daily (Patient taking differently: Take 1,000 mg by mouth every 8 hours as needed )  apixaban ANTICOAGULANT (ELIQUIS) 5 MG tablet, Take 1 tablet (5 mg) by mouth 2 times daily  busPIRone (BUSPAR) 15 MG tablet, Take 1 tablet (15 mg) by mouth 2 times daily  calcium citrate-vitamin D (CITRACAL) 315-250 MG-UNIT TABS, Take 1 tablet by mouth every morning   Cholecalciferol (VITAMIN D3 PO), Take 2,000 Units by mouth every morning   HEMP OIL OR EXTRACT OR OTHER CBD CANNABINOID, NOT MEDICAL CANNABIS,, CBD cream  loperamide (IMODIUM) 2 MG capsule, Start with 2 caps (4 mg), then take one cap (2 mg) after each diarrheal stool as needed. Do not use more than 8 caps (16 mg) per day. (Patient taking differently: as needed Start with 2 caps (4 mg), then take one cap (2 mg) after each diarrheal stool as needed. Do not use more than 8 caps (16 mg) per day.)  LORazepam (ATIVAN) 1 MG tablet, Take 1 tablet (1 mg) by mouth every 4 hours as needed for anxiety or nausea  magnesium 250 MG tablet, Take 1 tablet by mouth every morning   morphine (MS CONTIN) 15 MG CR tablet, Take 1 tablet (15 mg) by mouth every evening  morphine (MSIR) 15 MG IR tablet, Take 15-30 mg by mouth every 3 hours as needed   moxifloxacin (VIGAMOX) 0.5 % ophthalmic solution, Apply 1 drop to eye 3 times daily  To operative eye  OLANZapine (ZYPREXA) 5 MG tablet, Take one tablet, starting the first day after chemotherapy for 5 days, then stop. Repeat for each chemotherapy cycle for nausea, vomiting.  potassium & sodium phosphates (POTASSIUM & SODIUM PHOSPHATES) 280-160-250 MG Packet, Take 1 packet by mouth 4 times daily  potassium chloride ER (KLOR-CON M) 10 MEQ CR tablet, Take 1 tablet (10 mEq) by mouth 3 times daily (Patient taking differently: Take 10 mEq by mouth 2 times daily )  prednisoLONE acetate (PRED FORTE) 1 % ophthalmic suspension, Apply 1 drop to eye 4 times daily To operative eye  prednisoLONE acetate (PRED FORTE) 1 % ophthalmic suspension, Apply 1 drop to eye 4 times daily To operative eye  promethazine (PHENERGAN) 25 MG tablet, Take 25 mg by mouth every 6 hours as needed  propranolol ER (INDERAL LA) 80 MG 24 hr capsule, Take 1 capsule (80 mg) by mouth every morning  senna-docusate (SENOKOT-S/PERICOLACE) 8.6-50 MG tablet, Take 1-2 tablets by mouth 2 times daily as needed for constipation . Goal is to have a bowel movement every 1-2 days. (Patient taking differently: Take 1-2 tablets by mouth as needed for constipation . Goal is to have a bowel movement every 1-2 days.)  traZODone (DESYREL) 50 MG tablet, Take 1 tablet (50 mg) by mouth At Bedtime  venlafaxine (EFFEXOR-XR) 75 MG 24 hr capsule, Take 3 capsules (225 mg) by mouth every morning  [DISCONTINUED] capecitabine (XELODA) 500 MG tablet CHEMO, Take 4 tabs ( 2000mg ) in the morning and 3 tabs (1500mg ) in the evening. Days 1 through 14, then off for 7 days. (Patient not taking: Reported on 10/7/2019)    No current facility-administered medications on file prior to visit.       Allergy:     Allergies   Allergen Reactions     Bactrim [Sulfamethoxazole W/Trimethoprim]      Internal bleeding     Copaxone [Glatiramer] Hives       Video physical exam  General: Patient appears well in no acute distress. Normal body habitus.  Skin: No visualized rash or lesions on  visualized skin  Eyes: EOMI, no erythema, sclera icterus or discharge noted  Resp: Appears to be breathing comfortably without accessory muscle usage, speaking in full sentences, no cough  MSK: Appears to have normal range of motion based on visualized movements  Neurologic: No apparent tremors, facial movements symmetric  Psych: affect versatile, alert and oriented    The rest of a comprehensive physical examination is deferred due to PHE (public health emergency) video restrictions    Labs:  none    Imaging:    Assessment and plan:  Shaneka Alvarez is a 60 year old female with metastatic triple negative breast cancer who has received several lines of therapy. Disease is in the bone. She is currently on Trodelvy, d1 and d8 in 21 day cycle, since November 2020. The dose has been reduced 25% for neutropenia.     1. Breast cancer with bone metastasis  -Next cycle due 4/14  -Monthly Xgeva, due 4/14  -Continue calcium and vitamin D    2. Nausea/emesis  -Continue with Emend Decadron and Zofran.  Nausea is under good control.  She takes Compazine or Zyprexa as needed. Gets migraines with oral Zofran.     3. Cancer related pain, controlled  -Pain is under decent control with morphine sulfate immediate release (0-1 a day) and MS Contin 15 mg in PM.  She is following with palliative care. Refilled today    4. Leukopenia/Neutropenia.  Trodelvy dose reduction of 25% starting cycle #3.    She has mild leukopenia with a normal to slightly low neutrophil count.  Continue to observe.       5. Anemia.  Related to chemotherapy.  Last Hemoglobin 11.2 g/dl.  Continue to serially monitor.       6. Bilateral pulmonary emboli.  Continue Eliquis for incidentally found PE on 7/17/2020.      7. Neuropathy.  Stable mild neuropathy in the hands. Continue to observe.      8. Insomnia.  Continue trazodone. Refilled today    The total time of this encounter amounted to 30 minutes.This time included face to face time spent with the patient, prep  work, ordering tests, and performing post visit documentation.    Amee Dia PA-C    Video start time: 10:00 am Stop time 10:15am  [facility] via [employer]                         Amee Dia PA-C

## 2021-04-12 NOTE — NURSING NOTE
Shaneka is a 60 year old who is being evaluated via a billable video visit.      How would you like to obtain your AVS? MyChart  If the video visit is dropped, the invitation should be resent by: Text to cell phone: [phone]  Will anyone else be joining your video visit? No        Video-Visit Details    Type of service:  Video Visit    Originating Location (pt. Location): Home in [place]    Distant Location (provider location):  [facility]     Platform used for Video Visit: [employer]

## 2021-04-14 ENCOUNTER — INFUSION THERAPY VISIT (OUTPATIENT)
Dept: INFUSION THERAPY | Facility: CLINIC | Age: 61
End: 2021-04-14
Attending: PHYSICIAN ASSISTANT
Payer: COMMERCIAL

## 2021-04-14 ENCOUNTER — OFFICE VISIT (OUTPATIENT)
Dept: ONCOLOGY | Facility: CLINIC | Age: 61
End: 2021-04-14
Payer: COMMERCIAL

## 2021-04-14 VITALS
BODY MASS INDEX: 25.17 KG/M2 | RESPIRATION RATE: 14 BRPM | SYSTOLIC BLOOD PRESSURE: 117 MMHG | DIASTOLIC BLOOD PRESSURE: 70 MMHG | WEIGHT: 137.6 LBS | HEART RATE: 73 BPM | OXYGEN SATURATION: 99 % | TEMPERATURE: 98.2 F

## 2021-04-14 DIAGNOSIS — C50.912 BILATERAL MALIGNANT NEOPLASM OF BREAST IN FEMALE, UNSPECIFIED ESTROGEN RECEPTOR STATUS, UNSPECIFIED SITE OF BREAST (H): ICD-10-CM

## 2021-04-14 DIAGNOSIS — E87.6 HYPOKALEMIA: Primary | ICD-10-CM

## 2021-04-14 DIAGNOSIS — C50.919 METASTATIC BREAST CANCER: ICD-10-CM

## 2021-04-14 DIAGNOSIS — T45.1X5A CHEMOTHERAPY-INDUCED NEUTROPENIA (H): ICD-10-CM

## 2021-04-14 DIAGNOSIS — C50.911 BILATERAL MALIGNANT NEOPLASM OF BREAST IN FEMALE, UNSPECIFIED ESTROGEN RECEPTOR STATUS, UNSPECIFIED SITE OF BREAST (H): ICD-10-CM

## 2021-04-14 DIAGNOSIS — D70.1 CHEMOTHERAPY-INDUCED NEUTROPENIA (H): ICD-10-CM

## 2021-04-14 DIAGNOSIS — C79.51 BONE METASTASES: ICD-10-CM

## 2021-04-14 LAB
ALBUMIN SERPL-MCNC: 3.6 G/DL (ref 3.4–5)
ALP SERPL-CCNC: 55 U/L (ref 40–150)
ALT SERPL W P-5'-P-CCNC: 30 U/L (ref 0–50)
ANION GAP SERPL CALCULATED.3IONS-SCNC: 5 MMOL/L (ref 3–14)
AST SERPL W P-5'-P-CCNC: 19 U/L (ref 0–45)
BASOPHILS # BLD AUTO: 0 10E9/L (ref 0–0.2)
BASOPHILS NFR BLD AUTO: 0.8 %
BILIRUB SERPL-MCNC: 0.4 MG/DL (ref 0.2–1.3)
BUN SERPL-MCNC: 8 MG/DL (ref 7–30)
CALCIUM SERPL-MCNC: 8.8 MG/DL (ref 8.5–10.1)
CANCER AG27-29 SERPL-ACNC: 371 U/ML (ref 0–39)
CHLORIDE SERPL-SCNC: 106 MMOL/L (ref 94–109)
CO2 SERPL-SCNC: 28 MMOL/L (ref 20–32)
CREAT SERPL-MCNC: 0.72 MG/DL (ref 0.52–1.04)
DIFFERENTIAL METHOD BLD: ABNORMAL
EOSINOPHIL # BLD AUTO: 0.1 10E9/L (ref 0–0.7)
EOSINOPHIL NFR BLD AUTO: 2.1 %
ERYTHROCYTE [DISTWIDTH] IN BLOOD BY AUTOMATED COUNT: 15.1 % (ref 10–15)
GFR SERPL CREATININE-BSD FRML MDRD: >90 ML/MIN/{1.73_M2}
GLUCOSE SERPL-MCNC: 145 MG/DL (ref 70–99)
HCT VFR BLD AUTO: 36.2 % (ref 35–47)
HGB BLD-MCNC: 11.4 G/DL (ref 11.7–15.7)
IMM GRANULOCYTES # BLD: 0 10E9/L (ref 0–0.4)
IMM GRANULOCYTES NFR BLD: 0.4 %
LYMPHOCYTES # BLD AUTO: 0.7 10E9/L (ref 0.8–5.3)
LYMPHOCYTES NFR BLD AUTO: 12.9 %
MAGNESIUM SERPL-MCNC: 2.1 MG/DL (ref 1.6–2.3)
MCH RBC QN AUTO: 28.3 PG (ref 26.5–33)
MCHC RBC AUTO-ENTMCNC: 31.5 G/DL (ref 31.5–36.5)
MCV RBC AUTO: 90 FL (ref 78–100)
MONOCYTES # BLD AUTO: 0.6 10E9/L (ref 0–1.3)
MONOCYTES NFR BLD AUTO: 10.7 %
NEUTROPHILS # BLD AUTO: 3.8 10E9/L (ref 1.6–8.3)
NEUTROPHILS NFR BLD AUTO: 73.1 %
PHOSPHATE SERPL-MCNC: 3.6 MG/DL (ref 2.5–4.5)
PLATELET # BLD AUTO: 200 10E9/L (ref 150–450)
POTASSIUM SERPL-SCNC: 3.5 MMOL/L (ref 3.4–5.3)
PROT SERPL-MCNC: 6.5 G/DL (ref 6.8–8.8)
RBC # BLD AUTO: 4.03 10E12/L (ref 3.8–5.2)
SODIUM SERPL-SCNC: 139 MMOL/L (ref 133–144)
WBC # BLD AUTO: 5.2 10E9/L (ref 4–11)

## 2021-04-14 PROCEDURE — 96375 TX/PRO/DX INJ NEW DRUG ADDON: CPT | Performed by: INTERNAL MEDICINE

## 2021-04-14 PROCEDURE — 83735 ASSAY OF MAGNESIUM: CPT | Performed by: NURSE PRACTITIONER

## 2021-04-14 PROCEDURE — 96413 CHEMO IV INFUSION 1 HR: CPT | Performed by: INTERNAL MEDICINE

## 2021-04-14 PROCEDURE — S0028 INJECTION, FAMOTIDINE, 20 MG: HCPCS | Performed by: INTERNAL MEDICINE

## 2021-04-14 PROCEDURE — 80053 COMPREHEN METABOLIC PANEL: CPT | Performed by: NURSE PRACTITIONER

## 2021-04-14 PROCEDURE — 96372 THER/PROPH/DIAG INJ SC/IM: CPT | Mod: 59 | Performed by: INTERNAL MEDICINE

## 2021-04-14 PROCEDURE — 84100 ASSAY OF PHOSPHORUS: CPT | Performed by: NURSE PRACTITIONER

## 2021-04-14 PROCEDURE — 99207 PR NO CHARGE NURSE ONLY: CPT

## 2021-04-14 PROCEDURE — 99207 PR NO CHARGE LOS: CPT

## 2021-04-14 PROCEDURE — 85025 COMPLETE CBC W/AUTO DIFF WBC: CPT | Performed by: NURSE PRACTITIONER

## 2021-04-14 PROCEDURE — 86300 IMMUNOASSAY TUMOR CA 15-3: CPT | Performed by: NURSE PRACTITIONER

## 2021-04-14 PROCEDURE — 96367 TX/PROPH/DG ADDL SEQ IV INF: CPT | Performed by: INTERNAL MEDICINE

## 2021-04-14 RX ORDER — HEPARIN SODIUM (PORCINE) LOCK FLUSH IV SOLN 100 UNIT/ML 100 UNIT/ML
5 SOLUTION INTRAVENOUS
Status: DISCONTINUED | OUTPATIENT
Start: 2021-04-14 | End: 2021-04-14 | Stop reason: HOSPADM

## 2021-04-14 RX ORDER — ACETAMINOPHEN 325 MG/1
650 TABLET ORAL ONCE
Status: COMPLETED | OUTPATIENT
Start: 2021-04-14 | End: 2021-04-14

## 2021-04-14 RX ORDER — DIPHENHYDRAMINE HCL 25 MG
50 CAPSULE ORAL ONCE
Status: COMPLETED | OUTPATIENT
Start: 2021-04-14 | End: 2021-04-14

## 2021-04-14 RX ORDER — HEPARIN SODIUM (PORCINE) LOCK FLUSH IV SOLN 100 UNIT/ML 100 UNIT/ML
500 SOLUTION INTRAVENOUS ONCE
Status: COMPLETED | OUTPATIENT
Start: 2021-04-14 | End: 2021-04-14

## 2021-04-14 RX ADMIN — Medication 50 MG: at 13:55

## 2021-04-14 RX ADMIN — Medication 500 ML: at 13:53

## 2021-04-14 RX ADMIN — HEPARIN SODIUM (PORCINE) LOCK FLUSH IV SOLN 100 UNIT/ML 500 UNITS: 100 SOLUTION at 13:12

## 2021-04-14 RX ADMIN — ACETAMINOPHEN 650 MG: 325 TABLET ORAL at 13:55

## 2021-04-14 NOTE — PROGRESS NOTES
"Infusion Nursing Note:  Shaneka Alvarez presents today for C8D1 Sacituzumab govitecan-hziy and Xgeva.    Patient seen by provider today: No   present during visit today: Not Applicable.    Note:  Patient states she is taking Calcium and Vitamin D.    Five minutes before the end of patient's post Trodelvy observation, patient put call light on stating she had discomfort at port site. Normal saline was infusing during observation. Patient states 13 minutes prior, she leaned forward and felt her port \"pull.\"  Had discomfort for 13 minutes before putting call light on.  Left chest port site is flesh colored, cool to the touch and swollen.  Diameter of swollen area is  9.5 cm x 9.5 cm.  Shirt was damp at left chest.  Unable to palpate port.  Discussed with charge nurse. Deaccessed port.  Did not attempt to reaccess, as site was swollen and tender, and thus Heparin was not given.  Dane margins around port.  Placed sterile gauze and Tegaderm on port site. Patient will monitor port site at home, and call if swelling increases, area becomes pink/red, or are becomes more painful. Patient will return to clinic for her appointment next week and will access port then.    Intravenous Access:  Implanted Port.    Treatment Conditions:  Lab Results   Component Value Date    HGB 11.4 04/14/2021     Lab Results   Component Value Date    WBC 5.2 04/14/2021      Lab Results   Component Value Date    ANEU 3.8 04/14/2021     Lab Results   Component Value Date     04/14/2021      Lab Results   Component Value Date     04/14/2021                   Lab Results   Component Value Date    POTASSIUM 3.5 04/14/2021           Lab Results   Component Value Date    MAG 2.1 04/14/2021            Lab Results   Component Value Date    CR 0.72 04/14/2021                   Lab Results   Component Value Date    RAFAELA 8.8 04/14/2021                Lab Results   Component Value Date    BILITOTAL 0.4 04/14/2021           Lab Results "   Component Value Date    ALBUMIN 3.6 04/14/2021                    Lab Results   Component Value Date    ALT 30 04/14/2021           Lab Results   Component Value Date    AST 19 04/14/2021       Results reviewed, labs MET treatment parameters, ok to proceed with treatment.      Post Infusion Assessment:  Patient tolerated infusion without incident.  Patient observed for 30 minutes post Trodelvy per protocol.  Patient tolerated injection without incident.  Blood return noted pre and post infusion.  Site patent and intact, free from redness, edema or discomfort.  No evidence of extravasations.  Access discontinued per protocol.       Discharge Plan:   Sent inOnline Deale[employer] message to care coordinator in regards to port infiltration and possible follow up call.  AVS to patient via [employer].  Patient will return 4/21/21 for next appointment.   Patient discharged in stable condition accompanied by: self.  Departure Mode: Ambulatory.    Lauren Shaw RN

## 2021-04-14 NOTE — PROGRESS NOTES
Patient's port accessed without incident. Blood return noted. Tubes drawn, labeled and sent to lab. Port flushed with saline and heparin. Patient tolerated lab draw well.     Alycia Judge RN

## 2021-04-14 NOTE — LETTER
"    4/14/2021         RE: Shaneka Alvarez  [address] [facility] [address]        Dear Colleague,    Thank you for referring your patient, Shaneka Alvarez, to the [facility]. Please see a copy of my visit note below.    Infusion Nursing Note:  Shaneka Alvarez presents today for C8D1 Sacituzumab govitecan-hziy and Xgeva.    Patient seen by provider today: No   present during visit today: Not Applicable.    Note:  Patient states she is taking Calcium and Vitamin D.    Five minutes before the end of patient's post Trodelvy observation, patient put call light on stating she had discomfort at port site. Normal saline was infusing during observation. Patient states 13 minutes prior, she leaned forward and felt her port \"pull.\"  Had discomfort for 13 minutes before putting call light on.  Left chest port site is flesh colored, cool to the touch and swollen.  Diameter of swollen area is  9.5 cm x 9.5 cm.  Shirt was damp at left chest.  Unable to palpate port.  Discussed with charge nurse. Deaccessed port.  Did not attempt to reaccess, as site was swollen and tender, and thus Heparin was not given.  Dane margins around port.  Placed sterile gauze and Tegaderm on port site. Patient will monitor port site at home, and call if swelling increases, area becomes pink/red, or are becomes more painful. Patient will return to clinic for her appointment next week and will access port then.    Intravenous Access:  Implanted Port.    Treatment Conditions:  Lab Results   Component Value Date    HGB 11.4 04/14/2021     Lab Results   Component Value Date    WBC 5.2 04/14/2021      Lab Results   Component Value Date    ANEU 3.8 04/14/2021     Lab Results   Component Value Date     04/14/2021      Lab Results   Component Value Date     04/14/2021                   Lab Results   Component Value Date    POTASSIUM 3.5 04/14/2021           Lab Results   Component Value Date    " MAG 2.1 04/14/2021            Lab Results   Component Value Date    CR 0.72 04/14/2021                   Lab Results   Component Value Date    RAFAELA 8.8 04/14/2021                Lab Results   Component Value Date    BILITOTAL 0.4 04/14/2021           Lab Results   Component Value Date    ALBUMIN 3.6 04/14/2021                    Lab Results   Component Value Date    ALT 30 04/14/2021           Lab Results   Component Value Date    AST 19 04/14/2021       Results reviewed, labs MET treatment parameters, ok to proceed with treatment.      Post Infusion Assessment:  Patient tolerated infusion without incident.  Patient observed for 30 minutes post Trodelvy per protocol.  Patient tolerated injection without incident.  Blood return noted pre and post infusion.  Site patent and intact, free from redness, edema or discomfort.  No evidence of extravasations.  Access discontinued per protocol.       Discharge Plan:   Sent inbasket message to care coordinator in regards to port infiltration and possible follow up call.  AVS to patient via [employer].  Patient will return 4/21/21 for next appointment.   Patient discharged in stable condition accompanied by: self.  Departure Mode: Ambulatory.    Lauren Shaw RN                          Again, thank you for allowing me to participate in the care of your patient.        Sincerely,        [facility]

## 2021-04-20 PROBLEM — G89.3 CANCER RELATED PAIN: Status: ACTIVE | Noted: 2021-02-03

## 2021-04-21 ENCOUNTER — INFUSION THERAPY VISIT (OUTPATIENT)
Dept: INFUSION THERAPY | Facility: CLINIC | Age: 61
End: 2021-04-21
Attending: PHYSICIAN ASSISTANT
Payer: COMMERCIAL

## 2021-04-21 VITALS
TEMPERATURE: 98.5 F | HEART RATE: 65 BPM | DIASTOLIC BLOOD PRESSURE: 94 MMHG | WEIGHT: 141.3 LBS | SYSTOLIC BLOOD PRESSURE: 138 MMHG | BODY MASS INDEX: 25.84 KG/M2 | OXYGEN SATURATION: 100 % | RESPIRATION RATE: 16 BRPM

## 2021-04-21 DIAGNOSIS — D70.1 CHEMOTHERAPY-INDUCED NEUTROPENIA (H): ICD-10-CM

## 2021-04-21 DIAGNOSIS — C50.912 BILATERAL MALIGNANT NEOPLASM OF BREAST IN FEMALE, UNSPECIFIED ESTROGEN RECEPTOR STATUS, UNSPECIFIED SITE OF BREAST (H): ICD-10-CM

## 2021-04-21 DIAGNOSIS — T45.1X5A CHEMOTHERAPY-INDUCED NEUTROPENIA (H): ICD-10-CM

## 2021-04-21 DIAGNOSIS — C50.911 BILATERAL MALIGNANT NEOPLASM OF BREAST IN FEMALE, UNSPECIFIED ESTROGEN RECEPTOR STATUS, UNSPECIFIED SITE OF BREAST (H): ICD-10-CM

## 2021-04-21 DIAGNOSIS — C50.919 METASTATIC BREAST CANCER: ICD-10-CM

## 2021-04-21 DIAGNOSIS — E87.6 HYPOKALEMIA: Primary | ICD-10-CM

## 2021-04-21 LAB
ALBUMIN SERPL-MCNC: 3.2 G/DL (ref 3.4–5)
ALP SERPL-CCNC: 65 U/L (ref 40–150)
ALT SERPL W P-5'-P-CCNC: 25 U/L (ref 0–50)
ANION GAP SERPL CALCULATED.3IONS-SCNC: 3 MMOL/L (ref 3–14)
AST SERPL W P-5'-P-CCNC: 19 U/L (ref 0–45)
BASOPHILS # BLD AUTO: 0 10E9/L (ref 0–0.2)
BASOPHILS NFR BLD AUTO: 1.3 %
BILIRUB SERPL-MCNC: 0.2 MG/DL (ref 0.2–1.3)
BUN SERPL-MCNC: 11 MG/DL (ref 7–30)
CALCIUM SERPL-MCNC: 8.8 MG/DL (ref 8.5–10.1)
CHLORIDE SERPL-SCNC: 108 MMOL/L (ref 94–109)
CO2 SERPL-SCNC: 29 MMOL/L (ref 20–32)
CREAT SERPL-MCNC: 0.67 MG/DL (ref 0.52–1.04)
DIFFERENTIAL METHOD BLD: ABNORMAL
EOSINOPHIL # BLD AUTO: 0.2 10E9/L (ref 0–0.7)
EOSINOPHIL NFR BLD AUTO: 4.7 %
ERYTHROCYTE [DISTWIDTH] IN BLOOD BY AUTOMATED COUNT: 15.2 % (ref 10–15)
GFR SERPL CREATININE-BSD FRML MDRD: >90 ML/MIN/{1.73_M2}
GLUCOSE SERPL-MCNC: 92 MG/DL (ref 70–99)
HCT VFR BLD AUTO: 35.2 % (ref 35–47)
HGB BLD-MCNC: 11.4 G/DL (ref 11.7–15.7)
IMM GRANULOCYTES # BLD: 0.1 10E9/L (ref 0–0.4)
IMM GRANULOCYTES NFR BLD: 2.5 %
LYMPHOCYTES # BLD AUTO: 0.5 10E9/L (ref 0.8–5.3)
LYMPHOCYTES NFR BLD AUTO: 15.7 %
MAGNESIUM SERPL-MCNC: 2.1 MG/DL (ref 1.6–2.3)
MCH RBC QN AUTO: 29.1 PG (ref 26.5–33)
MCHC RBC AUTO-ENTMCNC: 32.4 G/DL (ref 31.5–36.5)
MCV RBC AUTO: 90 FL (ref 78–100)
MONOCYTES # BLD AUTO: 0.4 10E9/L (ref 0–1.3)
MONOCYTES NFR BLD AUTO: 11.9 %
NEUTROPHILS # BLD AUTO: 2 10E9/L (ref 1.6–8.3)
NEUTROPHILS NFR BLD AUTO: 63.9 %
PHOSPHATE SERPL-MCNC: 3.3 MG/DL (ref 2.5–4.5)
PLATELET # BLD AUTO: 175 10E9/L (ref 150–450)
POTASSIUM SERPL-SCNC: 3.9 MMOL/L (ref 3.4–5.3)
PROT SERPL-MCNC: 6.2 G/DL (ref 6.8–8.8)
RBC # BLD AUTO: 3.92 10E12/L (ref 3.8–5.2)
SODIUM SERPL-SCNC: 140 MMOL/L (ref 133–144)
WBC # BLD AUTO: 3.2 10E9/L (ref 4–11)

## 2021-04-21 PROCEDURE — 99207 PR NO CHARGE LOS: CPT

## 2021-04-21 PROCEDURE — 96367 TX/PROPH/DG ADDL SEQ IV INF: CPT | Performed by: NURSE PRACTITIONER

## 2021-04-21 PROCEDURE — 96413 CHEMO IV INFUSION 1 HR: CPT | Performed by: NURSE PRACTITIONER

## 2021-04-21 PROCEDURE — S0028 INJECTION, FAMOTIDINE, 20 MG: HCPCS | Performed by: NURSE PRACTITIONER

## 2021-04-21 PROCEDURE — 83735 ASSAY OF MAGNESIUM: CPT | Performed by: PHYSICIAN ASSISTANT

## 2021-04-21 PROCEDURE — 96375 TX/PRO/DX INJ NEW DRUG ADDON: CPT | Performed by: NURSE PRACTITIONER

## 2021-04-21 PROCEDURE — 80053 COMPREHEN METABOLIC PANEL: CPT | Performed by: PHYSICIAN ASSISTANT

## 2021-04-21 PROCEDURE — 84100 ASSAY OF PHOSPHORUS: CPT | Performed by: PHYSICIAN ASSISTANT

## 2021-04-21 PROCEDURE — 99207 PR NO CHARGE NURSE ONLY: CPT

## 2021-04-21 PROCEDURE — 85025 COMPLETE CBC W/AUTO DIFF WBC: CPT | Performed by: PHYSICIAN ASSISTANT

## 2021-04-21 RX ORDER — HEPARIN SODIUM (PORCINE) LOCK FLUSH IV SOLN 100 UNIT/ML 100 UNIT/ML
5 SOLUTION INTRAVENOUS
Status: DISCONTINUED | OUTPATIENT
Start: 2021-04-21 | End: 2021-04-29 | Stop reason: HOSPADM

## 2021-04-21 RX ORDER — DIPHENHYDRAMINE HCL 25 MG
50 CAPSULE ORAL ONCE
Status: COMPLETED | OUTPATIENT
Start: 2021-04-21 | End: 2021-04-21

## 2021-04-21 RX ORDER — HEPARIN SODIUM (PORCINE) LOCK FLUSH IV SOLN 100 UNIT/ML 100 UNIT/ML
500 SOLUTION INTRAVENOUS ONCE
Status: COMPLETED | OUTPATIENT
Start: 2021-04-21 | End: 2021-04-21

## 2021-04-21 RX ORDER — ACETAMINOPHEN 325 MG/1
650 TABLET ORAL ONCE
Status: COMPLETED | OUTPATIENT
Start: 2021-04-21 | End: 2021-04-21

## 2021-04-21 RX ADMIN — Medication 50 MG: at 14:16

## 2021-04-21 RX ADMIN — Medication 500 ML: at 14:34

## 2021-04-21 RX ADMIN — ACETAMINOPHEN 650 MG: 325 TABLET ORAL at 14:16

## 2021-04-21 RX ADMIN — HEPARIN SODIUM (PORCINE) LOCK FLUSH IV SOLN 100 UNIT/ML 500 UNITS: 100 SOLUTION at 13:14

## 2021-04-21 RX ADMIN — HEPARIN SODIUM (PORCINE) LOCK FLUSH IV SOLN 100 UNIT/ML 5 ML: 100 SOLUTION at 17:06

## 2021-04-21 ASSESSMENT — PAIN SCALES - GENERAL: PAINLEVEL: NO PAIN (0)

## 2021-04-21 NOTE — PROGRESS NOTES
Infusion Nursing Note:  Shaneka Alvarez presents today for: Karen.    Patient seen by provider today: No   present during visit today: Not Applicable.    Note: patient feeling well today.  No new symptoms or concerns.    Intravenous Access:  Implanted Port.    Treatment Conditions:  Results reviewed, labs MET treatment parameters, ok to proceed with treatment.      Post Infusion Assessment:  Patient tolerated infusion without incident.       Discharge Plan:   RTC 5/5/21 as scheduled for MD/ next chemo cycle .    PROSPER MCCRACKEN RN

## 2021-04-24 ENCOUNTER — HEALTH MAINTENANCE LETTER (OUTPATIENT)
Age: 61
End: 2021-04-24

## 2021-05-05 ENCOUNTER — INFUSION THERAPY VISIT (OUTPATIENT)
Dept: INFUSION THERAPY | Facility: CLINIC | Age: 61
End: 2021-05-05
Payer: COMMERCIAL

## 2021-05-05 ENCOUNTER — ONCOLOGY VISIT (OUTPATIENT)
Dept: ONCOLOGY | Facility: CLINIC | Age: 61
End: 2021-05-05
Payer: COMMERCIAL

## 2021-05-05 VITALS
HEIGHT: 62 IN | SYSTOLIC BLOOD PRESSURE: 116 MMHG | DIASTOLIC BLOOD PRESSURE: 89 MMHG | TEMPERATURE: 98 F | OXYGEN SATURATION: 100 % | RESPIRATION RATE: 14 BRPM | WEIGHT: 135.5 LBS | BODY MASS INDEX: 24.93 KG/M2 | HEART RATE: 90 BPM

## 2021-05-05 DIAGNOSIS — C50.912 BILATERAL MALIGNANT NEOPLASM OF BREAST IN FEMALE, UNSPECIFIED ESTROGEN RECEPTOR STATUS, UNSPECIFIED SITE OF BREAST (H): ICD-10-CM

## 2021-05-05 DIAGNOSIS — T45.1X5A CHEMOTHERAPY-INDUCED NEUTROPENIA (H): ICD-10-CM

## 2021-05-05 DIAGNOSIS — C50.911 BILATERAL MALIGNANT NEOPLASM OF BREAST IN FEMALE, UNSPECIFIED ESTROGEN RECEPTOR STATUS, UNSPECIFIED SITE OF BREAST (H): ICD-10-CM

## 2021-05-05 DIAGNOSIS — E87.6 HYPOKALEMIA: Primary | ICD-10-CM

## 2021-05-05 DIAGNOSIS — D70.1 CHEMOTHERAPY-INDUCED NEUTROPENIA (H): ICD-10-CM

## 2021-05-05 DIAGNOSIS — C79.51 BONE METASTASES: ICD-10-CM

## 2021-05-05 DIAGNOSIS — C50.919 METASTATIC BREAST CANCER: Primary | ICD-10-CM

## 2021-05-05 DIAGNOSIS — E87.6 HYPOKALEMIA: ICD-10-CM

## 2021-05-05 DIAGNOSIS — C50.919 METASTATIC BREAST CANCER: ICD-10-CM

## 2021-05-05 LAB
ALBUMIN SERPL-MCNC: 3.6 G/DL (ref 3.4–5)
ALP SERPL-CCNC: 61 U/L (ref 40–150)
ALT SERPL W P-5'-P-CCNC: 29 U/L (ref 0–50)
ANION GAP SERPL CALCULATED.3IONS-SCNC: 5 MMOL/L (ref 3–14)
AST SERPL W P-5'-P-CCNC: 22 U/L (ref 0–45)
BASOPHILS # BLD AUTO: 0.1 10E9/L (ref 0–0.2)
BASOPHILS NFR BLD AUTO: 1.4 %
BILIRUB SERPL-MCNC: 0.4 MG/DL (ref 0.2–1.3)
BUN SERPL-MCNC: 8 MG/DL (ref 7–30)
CALCIUM SERPL-MCNC: 8.7 MG/DL (ref 8.5–10.1)
CANCER AG27-29 SERPL-ACNC: 422 U/ML (ref 0–39)
CHLORIDE SERPL-SCNC: 107 MMOL/L (ref 94–109)
CO2 SERPL-SCNC: 27 MMOL/L (ref 20–32)
CREAT SERPL-MCNC: 0.66 MG/DL (ref 0.52–1.04)
DIFFERENTIAL METHOD BLD: ABNORMAL
EOSINOPHIL # BLD AUTO: 0.1 10E9/L (ref 0–0.7)
EOSINOPHIL NFR BLD AUTO: 2.2 %
ERYTHROCYTE [DISTWIDTH] IN BLOOD BY AUTOMATED COUNT: 14.1 % (ref 10–15)
GFR SERPL CREATININE-BSD FRML MDRD: >90 ML/MIN/{1.73_M2}
GLUCOSE SERPL-MCNC: 106 MG/DL (ref 70–99)
HCT VFR BLD AUTO: 37.8 % (ref 35–47)
HGB BLD-MCNC: 12.6 G/DL (ref 11.7–15.7)
IMM GRANULOCYTES # BLD: 0 10E9/L (ref 0–0.4)
IMM GRANULOCYTES NFR BLD: 0.5 %
LYMPHOCYTES # BLD AUTO: 0.6 10E9/L (ref 0.8–5.3)
LYMPHOCYTES NFR BLD AUTO: 17 %
MAGNESIUM SERPL-MCNC: 1.9 MG/DL (ref 1.6–2.3)
MCH RBC QN AUTO: 29.2 PG (ref 26.5–33)
MCHC RBC AUTO-ENTMCNC: 33.3 G/DL (ref 31.5–36.5)
MCV RBC AUTO: 88 FL (ref 78–100)
MONOCYTES # BLD AUTO: 0.5 10E9/L (ref 0–1.3)
MONOCYTES NFR BLD AUTO: 14 %
NEUTROPHILS # BLD AUTO: 2.4 10E9/L (ref 1.6–8.3)
NEUTROPHILS NFR BLD AUTO: 64.9 %
PHOSPHATE SERPL-MCNC: 3.2 MG/DL (ref 2.5–4.5)
PLATELET # BLD AUTO: 213 10E9/L (ref 150–450)
POTASSIUM SERPL-SCNC: 3.6 MMOL/L (ref 3.4–5.3)
PROT SERPL-MCNC: 6.9 G/DL (ref 6.8–8.8)
RBC # BLD AUTO: 4.32 10E12/L (ref 3.8–5.2)
SODIUM SERPL-SCNC: 139 MMOL/L (ref 133–144)
WBC # BLD AUTO: 3.6 10E9/L (ref 4–11)

## 2021-05-05 PROCEDURE — 80053 COMPREHEN METABOLIC PANEL: CPT | Performed by: INTERNAL MEDICINE

## 2021-05-05 PROCEDURE — 85025 COMPLETE CBC W/AUTO DIFF WBC: CPT | Performed by: INTERNAL MEDICINE

## 2021-05-05 PROCEDURE — 96367 TX/PROPH/DG ADDL SEQ IV INF: CPT | Performed by: NURSE PRACTITIONER

## 2021-05-05 PROCEDURE — 96375 TX/PRO/DX INJ NEW DRUG ADDON: CPT | Performed by: NURSE PRACTITIONER

## 2021-05-05 PROCEDURE — 83735 ASSAY OF MAGNESIUM: CPT | Performed by: INTERNAL MEDICINE

## 2021-05-05 PROCEDURE — 96413 CHEMO IV INFUSION 1 HR: CPT | Performed by: NURSE PRACTITIONER

## 2021-05-05 PROCEDURE — 99214 OFFICE O/P EST MOD 30 MIN: CPT | Mod: GT | Performed by: INTERNAL MEDICINE

## 2021-05-05 PROCEDURE — S0028 INJECTION, FAMOTIDINE, 20 MG: HCPCS | Performed by: NURSE PRACTITIONER

## 2021-05-05 PROCEDURE — 84100 ASSAY OF PHOSPHORUS: CPT | Performed by: INTERNAL MEDICINE

## 2021-05-05 PROCEDURE — 86300 IMMUNOASSAY TUMOR CA 15-3: CPT | Performed by: INTERNAL MEDICINE

## 2021-05-05 PROCEDURE — 99207 PR NO CHARGE LOS: CPT

## 2021-05-05 PROCEDURE — 99207 PR NO CHARGE NURSE ONLY: CPT

## 2021-05-05 RX ORDER — HEPARIN SODIUM,PORCINE 10 UNIT/ML
5 VIAL (ML) INTRAVENOUS
Status: CANCELLED | OUTPATIENT
Start: 2021-05-05

## 2021-05-05 RX ORDER — EPINEPHRINE 1 MG/ML
0.3 INJECTION, SOLUTION INTRAMUSCULAR; SUBCUTANEOUS EVERY 5 MIN PRN
Status: CANCELLED | OUTPATIENT
Start: 2021-05-05

## 2021-05-05 RX ORDER — MEPERIDINE HYDROCHLORIDE 25 MG/ML
25 INJECTION INTRAMUSCULAR; INTRAVENOUS; SUBCUTANEOUS EVERY 30 MIN PRN
Status: CANCELLED | OUTPATIENT
Start: 2021-05-12

## 2021-05-05 RX ORDER — DIPHENHYDRAMINE HCL 25 MG
50 CAPSULE ORAL ONCE
Status: CANCELLED | OUTPATIENT
Start: 2021-05-05

## 2021-05-05 RX ORDER — SODIUM CHLORIDE 9 MG/ML
1000 INJECTION, SOLUTION INTRAVENOUS CONTINUOUS PRN
Status: CANCELLED
Start: 2021-05-12

## 2021-05-05 RX ORDER — DIPHENHYDRAMINE HCL 25 MG
50 CAPSULE ORAL ONCE
Status: CANCELLED | OUTPATIENT
Start: 2021-05-12

## 2021-05-05 RX ORDER — HEPARIN SODIUM (PORCINE) LOCK FLUSH IV SOLN 100 UNIT/ML 100 UNIT/ML
5 SOLUTION INTRAVENOUS
Status: DISCONTINUED | OUTPATIENT
Start: 2021-05-05 | End: 2021-05-05 | Stop reason: HOSPADM

## 2021-05-05 RX ORDER — ALBUTEROL SULFATE 90 UG/1
1-2 AEROSOL, METERED RESPIRATORY (INHALATION)
Status: CANCELLED
Start: 2021-05-05

## 2021-05-05 RX ORDER — DIPHENHYDRAMINE HCL 25 MG
50 CAPSULE ORAL ONCE
Status: COMPLETED | OUTPATIENT
Start: 2021-05-05 | End: 2021-05-05

## 2021-05-05 RX ORDER — NALOXONE HYDROCHLORIDE 0.4 MG/ML
.1-.4 INJECTION, SOLUTION INTRAMUSCULAR; INTRAVENOUS; SUBCUTANEOUS
Status: CANCELLED | OUTPATIENT
Start: 2021-05-05

## 2021-05-05 RX ORDER — EPINEPHRINE 1 MG/ML
0.3 INJECTION, SOLUTION INTRAMUSCULAR; SUBCUTANEOUS EVERY 5 MIN PRN
Status: CANCELLED | OUTPATIENT
Start: 2021-05-12

## 2021-05-05 RX ORDER — HEPARIN SODIUM (PORCINE) LOCK FLUSH IV SOLN 100 UNIT/ML 100 UNIT/ML
500 SOLUTION INTRAVENOUS ONCE
Status: COMPLETED | OUTPATIENT
Start: 2021-05-05 | End: 2021-05-05

## 2021-05-05 RX ORDER — ACETAMINOPHEN 325 MG/1
650 TABLET ORAL ONCE
Status: CANCELLED | OUTPATIENT
Start: 2021-05-05

## 2021-05-05 RX ORDER — LORAZEPAM 2 MG/ML
0.5 INJECTION INTRAMUSCULAR EVERY 4 HOURS PRN
Status: CANCELLED
Start: 2021-05-12

## 2021-05-05 RX ORDER — ATROPINE SULFATE 0.4 MG/ML
0.4 AMPUL (ML) INJECTION
Status: CANCELLED | OUTPATIENT
Start: 2021-05-05

## 2021-05-05 RX ORDER — ATROPINE SULFATE 0.4 MG/ML
0.4 AMPUL (ML) INJECTION
Status: CANCELLED | OUTPATIENT
Start: 2021-05-12

## 2021-05-05 RX ORDER — ALBUTEROL SULFATE 0.83 MG/ML
2.5 SOLUTION RESPIRATORY (INHALATION)
Status: CANCELLED | OUTPATIENT
Start: 2021-05-12

## 2021-05-05 RX ORDER — NALOXONE HYDROCHLORIDE 0.4 MG/ML
.1-.4 INJECTION, SOLUTION INTRAMUSCULAR; INTRAVENOUS; SUBCUTANEOUS
Status: CANCELLED | OUTPATIENT
Start: 2021-05-12

## 2021-05-05 RX ORDER — METHYLPREDNISOLONE SODIUM SUCCINATE 125 MG/2ML
125 INJECTION, POWDER, LYOPHILIZED, FOR SOLUTION INTRAMUSCULAR; INTRAVENOUS
Status: CANCELLED
Start: 2021-05-05

## 2021-05-05 RX ORDER — MEPERIDINE HYDROCHLORIDE 25 MG/ML
25 INJECTION INTRAMUSCULAR; INTRAVENOUS; SUBCUTANEOUS EVERY 30 MIN PRN
Status: CANCELLED | OUTPATIENT
Start: 2021-05-05

## 2021-05-05 RX ORDER — DIPHENHYDRAMINE HYDROCHLORIDE 50 MG/ML
50 INJECTION INTRAMUSCULAR; INTRAVENOUS
Status: CANCELLED
Start: 2021-05-12

## 2021-05-05 RX ORDER — ACETAMINOPHEN 325 MG/1
650 TABLET ORAL ONCE
Status: COMPLETED | OUTPATIENT
Start: 2021-05-05 | End: 2021-05-05

## 2021-05-05 RX ORDER — ACETAMINOPHEN 325 MG/1
650 TABLET ORAL ONCE
Status: CANCELLED | OUTPATIENT
Start: 2021-05-12

## 2021-05-05 RX ORDER — HEPARIN SODIUM (PORCINE) LOCK FLUSH IV SOLN 100 UNIT/ML 100 UNIT/ML
5 SOLUTION INTRAVENOUS
Status: CANCELLED | OUTPATIENT
Start: 2021-05-05

## 2021-05-05 RX ORDER — ALBUTEROL SULFATE 0.83 MG/ML
2.5 SOLUTION RESPIRATORY (INHALATION)
Status: CANCELLED | OUTPATIENT
Start: 2021-05-05

## 2021-05-05 RX ORDER — LORAZEPAM 2 MG/ML
0.5 INJECTION INTRAMUSCULAR EVERY 4 HOURS PRN
Status: CANCELLED
Start: 2021-05-05

## 2021-05-05 RX ORDER — SODIUM CHLORIDE 9 MG/ML
1000 INJECTION, SOLUTION INTRAVENOUS CONTINUOUS PRN
Status: CANCELLED
Start: 2021-05-05

## 2021-05-05 RX ORDER — METHYLPREDNISOLONE SODIUM SUCCINATE 125 MG/2ML
125 INJECTION, POWDER, LYOPHILIZED, FOR SOLUTION INTRAMUSCULAR; INTRAVENOUS
Status: CANCELLED
Start: 2021-05-12

## 2021-05-05 RX ORDER — HEPARIN SODIUM,PORCINE 10 UNIT/ML
5 VIAL (ML) INTRAVENOUS
Status: CANCELLED | OUTPATIENT
Start: 2021-05-12

## 2021-05-05 RX ORDER — ALBUTEROL SULFATE 90 UG/1
1-2 AEROSOL, METERED RESPIRATORY (INHALATION)
Status: CANCELLED
Start: 2021-05-12

## 2021-05-05 RX ORDER — HEPARIN SODIUM (PORCINE) LOCK FLUSH IV SOLN 100 UNIT/ML 100 UNIT/ML
5 SOLUTION INTRAVENOUS
Status: CANCELLED | OUTPATIENT
Start: 2021-05-12

## 2021-05-05 RX ORDER — DIPHENHYDRAMINE HYDROCHLORIDE 50 MG/ML
50 INJECTION INTRAMUSCULAR; INTRAVENOUS
Status: CANCELLED
Start: 2021-05-05

## 2021-05-05 RX ADMIN — HEPARIN SODIUM (PORCINE) LOCK FLUSH IV SOLN 100 UNIT/ML 5 ML: 100 SOLUTION at 15:33

## 2021-05-05 RX ADMIN — Medication 50 MG: at 12:57

## 2021-05-05 RX ADMIN — HEPARIN SODIUM (PORCINE) LOCK FLUSH IV SOLN 100 UNIT/ML 500 UNITS: 100 SOLUTION at 11:16

## 2021-05-05 RX ADMIN — ACETAMINOPHEN 650 MG: 325 TABLET ORAL at 12:56

## 2021-05-05 RX ADMIN — Medication 500 ML: at 13:06

## 2021-05-05 ASSESSMENT — PAIN SCALES - GENERAL: PAINLEVEL: NO PAIN (0)

## 2021-05-05 ASSESSMENT — MIFFLIN-ST. JEOR: SCORE: 1147.87

## 2021-05-05 NOTE — PATIENT INSTRUCTIONS
Chemo today    Xgeva next week    See Annie in 3 weeks    Schedule PET around 6/11/2021    See me 6/16/2021 @930- and chemo and Xgeva to follow

## 2021-05-05 NOTE — PROGRESS NOTES
Infusion Nursing Note:  Shaneka Alvarez presents today for C9 D1 Trodelvy.    Patient seen by provider today: Yes:    present during visit today: Not Applicable.    Note:    Patient declined the covid-19 test.    Intravenous Access:  Implanted Port.    Treatment Conditions:  Lab Results   Component Value Date    HGB 12.6 05/05/2021     Lab Results   Component Value Date    WBC 3.6 05/05/2021      Lab Results   Component Value Date    ANEU 2.4 05/05/2021     Lab Results   Component Value Date     05/05/2021      Lab Results   Component Value Date     05/05/2021                   Lab Results   Component Value Date    POTASSIUM 3.6 05/05/2021           Lab Results   Component Value Date    MAG 1.9 05/05/2021            Lab Results   Component Value Date    CR 0.66 05/05/2021                   Lab Results   Component Value Date    RAFAELA 8.7 05/05/2021                Lab Results   Component Value Date    BILITOTAL 0.4 05/05/2021           Lab Results   Component Value Date    ALBUMIN 3.6 05/05/2021                    Lab Results   Component Value Date    ALT 29 05/05/2021           Lab Results   Component Value Date    AST 22 05/05/2021       Results reviewed, labs MET treatment parameters, ok to proceed with treatment.      Post Infusion Assessment:  Patient tolerated infusion without incident.  Patient observed for 30 minutes post Trodelvy per protocol.  Blood return noted pre and post infusion.  Site patent and intact, free from redness, edema or discomfort.  No evidence of extravasations.  Access discontinued per protocol.    Discharge Plan:   Discharge instructions reviewed with: Patient.  Patient and/or family verbalized understanding of discharge instructions and all questions answered.  Patient discharged in stable condition accompanied by: self.  Departure Mode: Ambulatory.    Yanci Kate RN

## 2021-05-05 NOTE — PROGRESS NOTES
ONCOLOGY FOLLOW UP NOTE: 5/5/2021        I took the history from reviewing the previous notes that she was following with Dr. Amaya.  I have copied and updated from prior notes.    ONCOLOGY History:    1.  January 2006:  Diagnosed with Stage IIB, T2 N1 M0 invasive lobular carcinoma of the right breast.  Final pathology showed a 4.5 x 3.8 x 2.5 cm, 01/14 lymph nodes positive.  Estrogen, progesterone receptor positive, HER-2 negative.     2.  Genetics.  BRCA1 and 2 mutations not detected. 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       THERAPY TO DATE:   1.  2006:  ECOG 2104 protocol of dose-dense Adriamycin, Cytoxan and Avastin x4 cycles followed by Taxol and Avastin x4 cycles.   2.  03/2007:  Completed 1 year Avastin.   3.  11/2006-01/2007:  Radiotherapy to the right breast of 5040 cGy.   4.  [id number]:  Aromasin.  Stopped after moving to the [facility].   5.  01/2016:  Right modified radical mastectomy with latissimus dorsi flap reconstruction and a left prophylactic mastectomy with latissimus dorsi flap reconstruction.     She recently had MRI of the brain as a follow-up for multiple sclerosis and there were multiple calvarial lesions noted which was suspicious for metastatic disease.  There was no evidence of new multiple sclerosis lesions.  She had a PET scan on 8/30/2019 which showed widespread bone metastatic lesions (calvarium, spine, sacrum, pelvis, ribs most prominent at C7, T3, L1 and L4), hypermetabolic 3 cm lesion in LLL, and mediastinal/hilar LN. CEA wnl at 1.9 and C27-29 elevated to 415 (28 on 8/23/16). A CT guided biopsy of the right iliac bone was obtained on 9/4/19, pathology consistent with metastatic adenocarcinoma (breast), ER/SC negative, HER-2 negative PDL 1 < 1%. A second biopsy was take of the LLL nodule via EBUS on 9/5/19 did not show any malignancy.    C/T/L spine MRI 8/3/19 to 9/2/19 with numerous enhancing lesions of the C, T and L spine, largest around T9 and L1. No evidence  of cord compression. Has a L1 compression fracture and impending L4.     Received radiation T12-L5 3000 cGy in 10 fractions from 9/6/2019 till 9/18/2019.  Neurosurgery recommended no surgical intervention but to wear Gorge brace when out of bed and HOB >30 degrees    She started palliative Xeloda 2000/1500 on 10/1/2019 but after just a few doses she noticed nausea, red eyes blurred vision, loss of appetite.  She stopped taking it after 5 doses and was seen by nurse practitioner on 10/7/2019 when she was improving.  At that point she was restarted on Xeloda at a lower dose of 1000 mg twice a day.  As she was not able to tolerate even the lower dose with extreme nausea and vomiting and feeling extremely fatigued and blurry vision, we decided on stopping Xeloda.    We decided on repeating scans and MRI brain on 10/25/2019 showed no intracranial metastatic disease.   Multiple enhancing calvarial lesions may be increased in number and are suggestive of metastatic disease. Thinner imaging on the current study may identify the smaller lesions and may be responsible for  identified more lesions.   There is a single focus of T2 hyperintense signal within the anterior right temporal lobe may represent interval demyelination. There is no evidence for active demyelination.    PET/CT on 11/1/2019 showed favorable response to therapy and Overall FDG uptake within scattered osseous metastases is decreased since 8/30/2019, particularly within the pelvis. Increased sclerotic appearance of L1 and L4 pathologic compression deformities, likely sequela of recently completed palliative radiation therapy.    No significant FDG uptake in previously demonstrated hypermetabolic mediastinal lymph nodes. Biopsy negative on 9/5/2019.  There is also decreased FDG uptake in the left lower lobe (biopsy negative for  malignancy), now with dense consolidation containing air bronchograms suggestive of infection versus aspiration.  There is diffuse  FDG uptake within the esophagus, consistent with esophagitis.    Because of intolerance to Xeloda we decided on switching to weekly paclitaxel on 11/5/2019.    C#2 Taxol 12/4/19- started with 70mg/m2 dose reduction    C#3 Taxol 12/30/2019     PET/CT on 1/24/2020 showed progression of the disease in some of the bone lesions including increase in size and lytic lesion within the vertebral body of C4 measuring 1 cm as opposed to 0.6 cm previously and a new central soft tissue nodule with SUV max 4.1 when previously it was non-hypermetabolic.  There is also some progression noted in the right proximal femur and right iliac bone.  There is decreased metabolic uptake in the right lamina of T9 with SUV max 4.7 when previously it was 8.0.  Other lesions are stable.    CA 27-29 also had increased significantly and it was 257 on 1/28/2020.    We decided on switching to eribulin on 1/28/2020.    C#2 2/18/2020  C#2 D#8 2/25/2020    C#3 D#1 3/18/2020 -delayed by 1 week as per patient's preference because she wanted to visit her family.    She had a repeat PET/CT on 3/27/2020 which showed stable size of the bone metastatic lesions although the FDG avidity was less, consistent with treatment response.    She had MRI of the thoracic spine on 4/3/2020 and it was compared to the one which was done in August 2019 and it showed increased size of several metastatic lesions most notably at T9 but also at T5-T7.  Other lesions at T10, T11 and T12 appeared improved.    CA 27-29 was also down to 222 on 3/18/2020.    Cycle #4 eribulin ( dose reduced to 1.2 mg/m2 )-4/7/2020-   Cycle #5 Eribulin ( 1.2 mg/m2 )- 4/28/2020   Cycle #6 Eribulin ( 1.2 mg/m2 )- 5/19/2020     Cycle #7 Eribulin ( 1.2 mg/m2 )- 6/9/2020    Cycle #8 Eribulin ( 1.2 mg/m2 )- 6/30/2020     She had a repeat PET scan on 7/17/2020 which showed incidental finding of new acute bilateral pulmonary embolism in the distal left main pulmonary artery extending in the left upper and  lower lobes and in the segmental and branch arteries in the right lower lobe.    It also showed mildly increased FDG avidity in the lytic lesion in the T9 lamina and right pedicle with SUV max 5.3, previously 3.9.  That is also slightly increased FDG uptake to the left anterior fifth rib at the costochondral junction SUV max 3.9, previously 2.5.  There is slightly decreased FDG uptake in the right femoral neck lesion SUV max 2.2, previously 2.9.  FDG uptake in other bone lesions is fairly stable.  No new metastasis are seen.    CA 27-29 was 308 on 6/30/2020.  It was 286 on 5/19/2020.    Overall this is consistent with only slight progression versus stable disease.    She was started on Lovenox.    Cycle #9 eribulin 7/21/2020. CA 27-29 was 238    C#10 eribulin 8/18/2020. ( delayed by one week for ANC 0.9 )    C#11 Eribulin 9/8/2020    C#12 Eribulin 9/29/2020     C#13 Eribulin 10/20/2020     PET scan on 11/6/2020 shows progression of the disease with increased FDG uptake in the lytic lesions in the T9/T10 and right posterolateral elements as well as increased FDG uptake in the previously known hypermetabolic right iliac bone lesion suggesting recurrence of previously treated metastasis.  There is new subtle lesion in the right manubrium.  There is also a new fracture in the anterolateral left fourth rib with associated uptake and this could be a pathologic fracture.  Healing fracture in the left anterior fifth rib lesion.  There is resolution of the left pulmonary emboli.  Decreased FDG uptake of the esophagus consistent with improving inflammation.    CA 27-29 on 10/20/2020 was also elevated at 489.    C#1 Trodelvy on 11/11/2020.  Cycle #2 Trodelvy 12/2/2020  Cycle number 2-day #8 Trodelvy 12/8/2020.    12/15/2020-12/16/2020.  She was admitted to the hospital with nausea vomiting and dehydration.  She had tachycardia and initial lactic acid of 5.  It decreased to 1.4 after 2 L of normal saline.  She also had  hypokalemia and ANC was 1.3.  She was treated with IV fluids.  Procalcitonin was negative.  CTA of the chest was negative for pulmonary embolism or pneumonia.  No bacterial infection was documented.  Her medication which she was initially unable to tolerate at home, were restarted and she was discharged home once started to feel better.      We delayed the start of cycle number 3 x 1 week and decrease the dose of chemotherapy by 25%.  She also had neutropenia with ANC of 1.0.      She started cycle number 3-day #1 on 12/29/2020.  CA 27-29 was 392.    Cycle number 3-day #8 1/5/2021.    C#4 Trodelvy 1/20/2021- 75% dose    2/5/2021.  PET/CT overall shows a good response to treatment with improvement of previously hypermetabolic lesions in the spine and pelvis and stability of other bone meta stasis.  There is a single lytic lesion in the right iliac bone which demonstrates slight Yimi increased FDG uptake and has SUV max 4.7 while previously it was SUV max 3.4.  There was diffuse wall thickening of the esophagus with uptake in the esophagus in the fundus of the stomach and this could be seen with inflammation.    Trodelvy cycle #5 - 2/10/2021.  75% of the dose.  CA 27-29 was 337.    Trodelvy cycle #6 - 3/3/2021.  75% of the dose.  Trodelvy cycle #6, D#8 - 3/10/2021.  75% of the dose.    Trodelvy C#8, D#8 on 4/21/2021  CA 27-29 stable at 371 on 4/14/2021    Trodelvy, cycle #9- 5/5/2021    On 2/25/2020 when she had biopsy of the right acetabulum and it was consistent with metastatic lobular carcinoma.  Again it was Triple Negative.     On 3/18/2020 when she also met with Dr. Arelis Arshad who also advised testing for androgen receptor studies on the biopsy specimen.  Tumor was diffusely androgen receptor positive.    Interval history.  She comes into the clinic and tells me overall she is tolerating chemotherapy well.  Nausea is under good control.  Pain is well controlled with the current regimen.  No new swellings.  Scalp  swelling is is stable to slightly bigger.  Neuropathy is a stable.  Energy is decent.  Bowel movements are good.  She lost a few pounds and she mentioned that she had migraines for a couple of days when she did not eat well but overall she has been eating well.    ECOG 0-1    ROS:  Rest of the comprehensive review of the system was essentially unremarkable.        I reviewed other history in epic as below.       PAST MEDICAL HISTORY:     1.  Breast cancer  2.  Multiple sclerosis.   3.  Depression.   4.  Migraines.   5.  Hypertension.   6.  Cholecystectomy and umbilical hernia repair.     SOCIAL HISTORY: She smoked for 5 years but quit many years ago.  Drinks alcohol socially.  She lives with her .  She is a teacher in middle school.       FAMILY HISTORY: She mentions that her mother had breast cancer at 49.  Both grandmothers had breast cancer but she is not sure of the age.  A couple of cousins have cancers.  Patient has 3 children who are healthy.    Current Outpatient Medications   Medication     acetaminophen (TYLENOL) 500 MG tablet     apixaban ANTICOAGULANT (ELIQUIS) 5 MG tablet     busPIRone (BUSPAR) 15 MG tablet     calcium citrate-vitamin D (CITRACAL) 315-250 MG-UNIT TABS     Cholecalciferol (VITAMIN D3 PO)     HEMP OIL OR EXTRACT OR OTHER CBD CANNABINOID, NOT MEDICAL CANNABIS,     LORazepam (ATIVAN) 1 MG tablet     magnesium 250 MG tablet     morphine (MS CONTIN) 15 MG CR tablet     morphine (MSIR) 15 MG IR tablet     OLANZapine (ZYPREXA) 5 MG tablet     promethazine (PHENERGAN) 25 MG tablet     propranolol ER (INDERAL LA) 80 MG 24 hr capsule     senna-docusate (SENOKOT-S/PERICOLACE) 8.6-50 MG tablet     traZODone (DESYREL) 50 MG tablet     venlafaxine (EFFEXOR-XR) 75 MG 24 hr capsule     No current facility-administered medications for this visit.         Allergies   Allergen Reactions     Bactrim [Sulfamethoxazole W/Trimethoprim]      Internal bleeding     Copaxone [Glatiramer] Hives           PHYSICAL EXAMINATION:     There were no vitals taken for this visit.  Wt Readings from Last 4 Encounters:   04/21/21 64.1 kg (141 lb 4.8 oz)   04/14/21 62.4 kg (137 lb 9.6 oz)   04/12/21 62.1 kg (137 lb)   03/31/21 62.3 kg (137 lb 4.8 oz)       CONSTITUTIONAL: No apparent distress  EYES: PERRLA, without pallor or jaundice  ENT/MOUTH: Ears unremarkable. No oral lesions  CVS: s1s2 normal  RESPIRATORY: Chest is clear  GI: Abdomen is benign  NEURO: Alert and oriented ×3  INTEGUMENT: no concerning skin rashes.  I believe the swelling at the back of the scalp looks pretty similar to previously.  LYMPHATIC: no palpable lymphadenopathy  MUSCULOSKELETAL: Unremarkable. No bony tenderness.   EXTREMITIES: no pedal edema  PSYCH: Mentation, mood and affect are appropriate          Labs and Imaging.    Reviewed.    5/5/2021   CBC-WBC 3.6.  Platelets.  ANC 2.4.  CMP is unremarkable      CA 27-29 was stable at 371 on 4/14/2021          Biopsy from the right acetabulum shows metastatic lobular carcinoma.  It is triple negative.  Androgen receptor was diffusely positive (90%, moderate intensity) by immunohistochemistry. )  PD-L1 negative.      Foundation 1 testing did not reveal any actionable mutations.      2/5/2021.  PET/CT overall shows a good response to treatment with improvement of previously hypermetabolic lesions in the spine and pelvis and stability of other bone meta stasis.  There is a single lytic lesion in the right iliac bone which demonstrates slight Yimi increased FDG uptake and has SUV max 4.7 while previously it was SUV max 3.4.  There was diffuse wall thickening of the esophagus with uptake in the esophagus in the fundus of the stomach and this could be seen with inflammation.      ASSESSMENT AND PLAN:   1.  History of stage IIB, T2 N1 M0 invasive lobular carcinoma of the right breast.  It was initially diagnosed in 2006 and at that time it was hormone receptor positive, HER-2 negative.  She was treated on ECOG  2104 protocol with dose-dense Adriamycin, Cytoxan and Avastin x4 cycles followed by Taxol and Avastin x4 cycles followed by a Avastin for 1 year.  She also received radiation to the right breast which she completed in January 2007 (5040 cGy).  She took Aromasin from 2007 to 2014.    Now she has metastatic breast cancer with extensive involvement of the bones.  There is also a left lower lobe hypermetabolic lesion and mediastinal lymph nodes which are hypermetabolic.  The biopsy from the right iliac bone is consistent with metastatic lobular breast cancer but it is hormone receptor and HER-2 negative and PDL 1 is also negative.    She has received 3000 cGy of palliative radiation to T12-L5 from 9/6/2019 till 9/18/2019.    She was started on palliative Xeloda but she was unable to tolerate even the dose reduced medication.        We decided on repeating scans and MRI brain on 10/25/2019 showed no intracranial metastatic disease.   Multiple enhancing calvarial lesions may be increased in number and are suggestive of metastatic disease. Thinner imaging on the current study may identify the smaller lesions and may be responsible for identified more lesions.   There is a single focus of T2 hyperintense signal within the anterior right temporal lobe may represent interval demyelination. There is no evidence for active demyelination.    PET/CT on 11/1/2019 showed favorable response to therapy and Overall FDG uptake within scattered osseous metastases is decreased since 8/30/2019, particularly within the pelvis. Increased sclerotic appearance of L1 and L4 pathologic compression deformities, likely sequela of recently completed palliative radiation therapy.    No significant FDG uptake in previously demonstrated hypermetabolic mediastinal lymph nodes. Biopsy negative on 9/5/2019.  There is also decreased FDG uptake in the left lower lobe (biopsy negative for malignancy), now with dense consolidation containing air  bronchograms suggestive of infection versus aspiration.  There is diffuse FDG uptake within the esophagus, consistent with esophagitis.    Because of intolerance to Xeloda we decided on switching to weekly paclitaxel on 11/5/2019.    For better tolerance we decreased the dose of paclitaxel to 70 mg/m  with cycle #2.  She has completed 3 cycles of Taxol and the last chemotherapy was on 1/14/2020.      PET/CT on 1/24/2020 showed progression of the disease in some of the bone lesions including increase in size and lytic lesion within the vertebral body of C4 measuring 1 cm as opposed to 0.6 cm previously and a new central soft tissue nodule with SUV max 4.1 when previously it was non-hypermetabolic.  There is also some progression noted in the right proximal femur and right iliac bone.  There is decreased metabolic uptake in the right lamina of T9 with SUV max 4.7 when previously it was 8.0.  Other lesions are stable.    There are no pathologically enlarged mediastinal, hilar or axillary lymph nodes. Calcified subcarinal lymph nodes. There are no suspicious lung nodules or evidence for infection and previous left lower lobe consolidation has resolved.     CA 27-29 also had increased significantly and it was 257 on 1/28/2020.    Due to progression we switched to eribulin on 1/28/2020.        After 3 cycles, she had a repeat PET/CT on 3/27/2020 which showed a stable size of the bone metastatic lesions although the FDG avidity was less, consistent with treatment response.    She had MRI of the thoracic spine on 4/3/2020 and it was compared to the one which was done in August 2019 and it showed increased size of several metastatic lesions most notably at T9 but also at T5-T7.  Other lesions at T10, T11 and T12 appeared improved.    CA 27-29 was also down to 222 on 3/18/2020.    I believe overall this is consistent with a partial response to the treatment.  Overall she is tolerating eribulin well with some worsening of  neuropathy and cytopenias.     We decided on continuing the chemotherapy with some modifications and she got cycle #4 with slightly decreased dose of eribulin to 1.2 mg/m2.      After 8 cycles of eribulin repeat PET CT on 7/17/2020 showed only minimally increased FDG uptake in T9 and T10 and in the left anterior fifth rib near the costochondral junction.  That is slightly decreased FDG avidity in the right femoral neck lesion.  Otherwise bone disease is a stable.  No other evidence of metastatic disease is found.    CA 27-29 on 6/30/2020 was 308.    We decided on continuing the same chemotherapy because overall clinical picture was consistent with stable to only minimally progressive disease and she was tolerating treatments well with decent quality of life.    Cycle #10 was delayed by 1 week because ANC was 0.9.        After 13 cycles of eribulin, PET scan on 11/6/2020 shows progression of the disease with increased FDG uptake in the lytic lesions in the T9/T10 and right posterolateral elements as well as increased FDG uptake in the previously known hypermetabolic right iliac bone lesion suggesting recurrence of previously treated metastasis.  There is new subtle lesion in the right manubrium.  There is also a new fracture in the anterolateral left fourth rib with associated uptake and this could be a pathologic fracture.  Healing fracture in the left anterior fifth rib lesion.  There is resolution of the left pulmonary emboli.  Decreased FDG uptake of the esophagus consistent with improving inflammation.    Because of progression of the disease, I recommend switching to recently FDA approved sacituzumab govitecan-hziy (Trodelvy) for TNBC, based on the results of the phase II IMMU-132-01 clinical trial.   We discussed the rational schedule and potential side effects of it in detail.    We gave it to her on 11/11/2020.    She had significant nausea and vomiting with it but otherwise tolerated it well.   She started  cycle #2 on 12/2/2020 and received day 8 on 12/8/2020.  She then got admitted for nausea vomiting dehydration and weakness.     We delayed the start of cycle number 3 x 1 week and decrease the dose of chemotherapy by 25% and she also has neutropenia with ANC of 1.  She started cycle number 3-day #1 on 12/29/2020.    CA 27-29 was 392.    Cycle number 3-day #8 1/5/2021.  She has been tolerating the reduced dose chemotherapy well.  After 4 cycles of chemotherapy, overall the PET scan shows positive response to treatment apart from mildly increased FDG avidity in one of the right iliac bone lesions.  CA 27-29 was 454 slightly elevated from before.    We decided on continuing the same chemotherapy.      She obtained a second opinion from Dr. Castillo from [facility] who recommended a repeat biopsy to be done to make sure that she really has triple negative breast cancer.      She also met with Dr. Arelis Arshad on 3/18/2021.    She had repeat biopsy done from the right acetabulum on 2/25/2021 which showed metastatic lobular breast cancer.  Hormonal studies, HER-2 FISH again showed that it is triple negative.  Androgen receptor is diffusely positive.    PD-L1 negative.  Foundation 1 testing did not reveal any actionable mutations.    Trodelvy is going well.  Last CA 27-29 was a stable at 371 on 4/14/2021.  Symptomatically she is doing well.  Plan to proceed with cycle #9 today.  I will repeat PET scan after cycle #10.      As the tumor is diffusely positive for androgen receptor, we will consider giving her androgen receptor blockers like Casodex or enzalutamide in the future.      Nausea/ vomiting.  Well controlled with Emend Decadron and Zofran.  She takes Compazine as needed but she has not required this recently.       Bone disease.  She has metastatic disease to the bone.  Previously she got palliative radiation to T12-L5 3000 cGy in 10 fractions from 9/6/2019 till 9/18/2019.  She was evaluated by  orthopedics Dr. Bipin Elliott on 11/1/2019 and conservative management was recommended.    She was on Zometa every 3 months. She last received on 9/29/2020.  Because of progression of the disease in the bones, we switched to Xgeva which she received on 11/11/2020.  She last got Xgeva on 4/14/2021.  She will receive it next week.  Continue calcium and vitamin D.  Plan to repeat PET/CT after cycle #10.        Pain management.  Pain is under decent control with morphine sulfate immediate release and MS Contin.  She is following with palliative care.    Leukopenia/Neutropenia.  We delayed chemotherapy by 1 week and also dose reduced it by 25% starting cycle #3.    Since then neutropenia has not been an issue.  She does have mild leukopenia with mild lymphocytopenia.  Continue to monitor.     Weight loss.  Advised her to increase protein in her diet.    Bilateral pulmonary emboli.  Continue Eliquis for incidentally found PE on 7/17/2020.       Neuropathy.  She has mild neuropathy in the hands.  It is same as before.  She does have chronic balance issues and heat and cold sensitivity from underlying multiple sclerosis and this has been a stable.          We did not address the following today.    Insomnia.  Continue trazodone.      Wall thickening of esophagus and FDG uptake of fundus of the stomach.  It could be in the setting of inflammation from recent nausea and vomiting.  Symptoms have resolved.  Continue to monitor clinically.      Subcutaneous nodule in the scalp.  I am not certain whether it is a cyst or a metastatic deposit.  PET scan does not cause this to light up.  Continue to observe.      Vision changes.    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  It was thought that this could be due to Taxol as patient reported to the ophthalmologist that she was on Taxol in September 2019 when her symptoms started.  But she was not on Taxol in September 2019 when she started  noticing the visual changes so Taxol is not responsible for this.  The first dose of Taxol was on 11/5/2019.   She had left cataract extraction on 2/8/2021 and she has noticed significant improvement in her vision.  She plans to do cataract extraction of the right eye in couple of weeks.          Increased FDG ability in the colon.  She had FDG uptake in the cecum and ascending colon but no corresponding lesion was seen on the CT scan.  She is completely asymptomatic so at this time we will continue to observe.    Discussion regarding healthcare directives.  On previous visit she told me that she will bring the health care directive for our records but she forgot so I told her to bring it on next visit.      Breast implant removal.  She tells me that she was planning to do breast implant removal because there was a recall on this.  Her surgery was scheduled for 9/24/2019.  Because of this new development of metastatic breast cancer and her recent radiation, surgery has been canceled.  We discussed that at this time treatment for metastatic breast cancer would take precedence over the other surgery as the other surgery would require her to be off chemotherapy for several weeks and in that case the chances of cancer progression would be high and I would not recommend that.    Multiple sclerosis.  She will continue to follow with her neurologist Dr. Quiles.  Currently multiple sclerosis is under control and currently she is not taking any medications.      Return to clinic in 3 weeks to see nurse practitioner.  I will see her back in 6 weeks.    All of her questions were answered to her satisfaction.  She is agreeable and comfortable with the plan.  Dewayne Zepeda MD

## 2021-05-05 NOTE — NURSING NOTE
"Oncology Rooming Note    May 5, 2021 12:12 PM   Shaneka Alvarez is a 60 year old female who presents for:    Chief Complaint   Patient presents with     Oncology Clinic Visit     Follow up     Initial Vitals: /89 (BP Location: Left arm)   Pulse 90   Temp 98  F (36.7  C) (Oral)   Resp 14   Ht 1.575 m (5' 2\")   Wt 61.5 kg (135 lb 8 oz)   SpO2 100%   BMI 24.78 kg/m   Estimated body mass index is 24.78 kg/m  as calculated from the following:    Height as of this encounter: 1.575 m (5' 2\").    Weight as of this encounter: 61.5 kg (135 lb 8 oz). Body surface area is 1.64 meters squared.  No Pain (0) Comment: Data Unavailable   No LMP recorded. Patient is postmenopausal.  Allergies reviewed: Yes  Medications reviewed: Yes    Medications: MEDICATION REFILLS NEEDED TODAY. Provider was notified.  Pharmacy name entered into [facility]:    [id number] - [place], [place] - [address] [id number] - [place], [place] - [address] [facility] [address] [facility] [address]  [facility] - [place] [address] [facility] - [place], [place] - [address]  [facility] - [place], [place] - [address] [address] [address] [facility]    Clinical concerns: Refill Morphine IR Dr. Zepeda was notified.      Rocío James CMA            "

## 2021-05-05 NOTE — LETTER
5/5/2021         RE: Shaneka Alvarez  [id number] [facility] [id number]        Dear Colleague,    Thank you for referring your patient, Shaneka Alvarez, to the [facility]. Please see a copy of my visit note below.      ONCOLOGY FOLLOW UP NOTE: 5/5/2021        I took the history from reviewing the previous notes that she was following with Dr. Amaya.  I have copied and updated from prior notes.    ONCOLOGY History:    1.  January 2006:  Diagnosed with Stage IIB, T2 N1 M0 invasive lobular carcinoma of the right breast.  Final pathology showed a 4.5 x 3.8 x 2.5 cm, 01/14 lymph nodes positive.  Estrogen, progesterone receptor positive, HER-2 negative.     2.  Genetics.  BRCA1 and 2 mutations not detected. 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       THERAPY TO DATE:   1. 2006:  ECOG 2104 protocol of dose-dense Adriamycin, Cytoxan and Avastin x4 cycles followed by Taxol and Avastin x4 cycles.   2.  03/2007:  Completed 1 year Avastin.   3.  11/2006-01/2007:  Radiotherapy to the right breast of 5040 cGy.   4.  03/[id number]:  Aromasin.  Stopped after moving to the [facility].   5.  01/2016:  Right modified radical mastectomy with latissimus dorsi flap reconstruction and a left prophylactic mastectomy with latissimus dorsi flap reconstruction.     She recently had MRI of the brain as a follow-up for multiple sclerosis and there were multiple calvarial lesions noted which was suspicious for metastatic disease.  There was no evidence of new multiple sclerosis lesions.  She had a PET scan on 8/30/2019 which showed widespread bone metastatic lesions (calvarium, spine, sacrum, pelvis, ribs most prominent at C7, T3, L1 and L4), hypermetabolic 3 cm lesion in LLL, and mediastinal/hilar LN. CEA wnl at 1.9 and C27-29 elevated to 415 (28 on 8/23/16). A CT guided biopsy of the right iliac bone was obtained on 9/4/19, pathology consistent with metastatic adenocarcinoma (breast),  ER/MI negative, HER-2 negative PDL 1 < 1%. A second biopsy was take of the LLL nodule via EBUS on 9/5/19 did not show any malignancy.    C/T/L spine MRI 8/3/19 to 9/2/19 with numerous enhancing lesions of the C, T and L spine, largest around T9 and L1. No evidence of cord compression. Has a L1 compression fracture and impending L4.     Received radiation T12-L5 3000 cGy in 10 fractions from 9/6/2019 till 9/18/2019.  Neurosurgery recommended no surgical intervention but to wear Orting brace when out of bed and HOB >30 degrees    She started palliative Xeloda 2000/1500 on 10/1/2019 but after just a few doses she noticed nausea, red eyes blurred vision, loss of appetite.  She stopped taking it after 5 doses and was seen by nurse practitioner on 10/7/2019 when she was improving.  At that point she was restarted on Xeloda at a lower dose of 1000 mg twice a day.  As she was not able to tolerate even the lower dose with extreme nausea and vomiting and feeling extremely fatigued and blurry vision, we decided on stopping Xeloda.    We decided on repeating scans and MRI brain on 10/25/2019 showed no intracranial metastatic disease.   Multiple enhancing calvarial lesions may be increased in number and are suggestive of metastatic disease. Thinner imaging on the current study may identify the smaller lesions and may be responsible for  identified more lesions.   There is a single focus of T2 hyperintense signal within the anterior right temporal lobe may represent interval demyelination. There is no evidence for active demyelination.    PET/CT on 11/1/2019 showed favorable response to therapy and Overall FDG uptake within scattered osseous metastases is decreased since 8/30/2019, particularly within the pelvis. Increased sclerotic appearance of L1 and L4 pathologic compression deformities, likely sequela of recently completed palliative radiation therapy.    No significant FDG uptake in previously demonstrated hypermetabolic  mediastinal lymph nodes. Biopsy negative on 9/5/2019.  There is also decreased FDG uptake in the left lower lobe (biopsy negative for  malignancy), now with dense consolidation containing air bronchograms suggestive of infection versus aspiration.  There is diffuse FDG uptake within the esophagus, consistent with esophagitis.    Because of intolerance to Xeloda we decided on switching to weekly paclitaxel on 11/5/2019.    C#2 Taxol 12/4/19- started with 70mg/m2 dose reduction    C#3 Taxol 12/30/2019     PET/CT on 1/24/2020 showed progression of the disease in some of the bone lesions including increase in size and lytic lesion within the vertebral body of C4 measuring 1 cm as opposed to 0.6 cm previously and a new central soft tissue nodule with SUV max 4.1 when previously it was non-hypermetabolic.  There is also some progression noted in the right proximal femur and right iliac bone.  There is decreased metabolic uptake in the right lamina of T9 with SUV max 4.7 when previously it was 8.0.  Other lesions are stable.    CA 27-29 also had increased significantly and it was 257 on 1/28/2020.    We decided on switching to eribulin on 1/28/2020.    C#2 2/18/2020  C#2 D#8 2/25/2020    C#3 D#1 3/18/2020 -delayed by 1 week as per patient's preference because she wanted to visit her family.    She had a repeat PET/CT on 3/27/2020 which showed stable size of the bone metastatic lesions although the FDG avidity was less, consistent with treatment response.    She had MRI of the thoracic spine on 4/3/2020 and it was compared to the one which was done in August 2019 and it showed increased size of several metastatic lesions most notably at T9 but also at T5-T7.  Other lesions at T10, T11 and T12 appeared improved.    CA 27-29 was also down to 222 on 3/18/2020.    Cycle #4 eribulin ( dose reduced to 1.2 mg/m2 )-4/7/2020-   Cycle #5 Eribulin ( 1.2 mg/m2 )- 4/28/2020   Cycle #6 Eribulin ( 1.2 mg/m2 )- 5/19/2020     Cycle #7  Eribulin ( 1.2 mg/m2 )- 6/9/2020    Cycle #8 Eribulin ( 1.2 mg/m2 )- 6/30/2020     She had a repeat PET scan on 7/17/2020 which showed incidental finding of new acute bilateral pulmonary embolism in the distal left main pulmonary artery extending in the left upper and lower lobes and in the segmental and branch arteries in the right lower lobe.    It also showed mildly increased FDG avidity in the lytic lesion in the T9 lamina and right pedicle with SUV max 5.3, previously 3.9.  That is also slightly increased FDG uptake to the left anterior fifth rib at the costochondral junction SUV max 3.9, previously 2.5.  There is slightly decreased FDG uptake in the right femoral neck lesion SUV max 2.2, previously 2.9.  FDG uptake in other bone lesions is fairly stable.  No new metastasis are seen.    CA 27-29 was 308 on 6/30/2020.  It was 286 on 5/19/2020.    Overall this is consistent with only slight progression versus stable disease.    She was started on Lovenox.    Cycle #9 eribulin 7/21/2020. CA 27-29 was 238    C#10 eribulin 8/18/2020. ( delayed by one week for ANC 0.9 )    C#11 Eribulin 9/8/2020    C#12 Eribulin 9/29/2020     C#13 Eribulin 10/20/2020     PET scan on 11/6/2020 shows progression of the disease with increased FDG uptake in the lytic lesions in the T9/T10 and right posterolateral elements as well as increased FDG uptake in the previously known hypermetabolic right iliac bone lesion suggesting recurrence of previously treated metastasis.  There is new subtle lesion in the right manubrium.  There is also a new fracture in the anterolateral left fourth rib with associated uptake and this could be a pathologic fracture.  Healing fracture in the left anterior fifth rib lesion.  There is resolution of the left pulmonary emboli.  Decreased FDG uptake of the esophagus consistent with improving inflammation.    CA 27-29 on 10/20/2020 was also elevated at 489.    C#1 Trodelvy on 11/11/2020.  Cycle #2 Trodelvy  12/2/2020  Cycle number 2-day #8 Trodelvy 12/8/2020.    12/15/2020-12/16/2020.  She was admitted to the hospital with nausea vomiting and dehydration.  She had tachycardia and initial lactic acid of 5.  It decreased to 1.4 after 2 L of normal saline.  She also had hypokalemia and ANC was 1.3.  She was treated with IV fluids.  Procalcitonin was negative.  CTA of the chest was negative for pulmonary embolism or pneumonia.  No bacterial infection was documented.  Her medication which she was initially unable to tolerate at home, were restarted and she was discharged home once started to feel better.      We delayed the start of cycle number 3 x 1 week and decrease the dose of chemotherapy by 25%.  She also had neutropenia with ANC of 1.0.      She started cycle number 3-day #1 on 12/29/2020.  CA 27-29 was 392.    Cycle number 3-day #8 1/5/2021.    C#4 Trodelvy 1/20/2021- 75% dose    2/5/2021.  PET/CT overall shows a good response to treatment with improvement of previously hypermetabolic lesions in the spine and pelvis and stability of other bone meta stasis.  There is a single lytic lesion in the right iliac bone which demonstrates slight Yimi increased FDG uptake and has SUV max 4.7 while previously it was SUV max 3.4.  There was diffuse wall thickening of the esophagus with uptake in the esophagus in the fundus of the stomach and this could be seen with inflammation.    Trodelvy cycle #5 - 2/10/2021.  75% of the dose.  CA 27-29 was 337.    Trodelvy cycle #6 - 3/3/2021.  75% of the dose.  Trodelvy cycle #6, D#8 - 3/10/2021.  75% of the dose.    Trodelvy C#8, D#8 on 4/21/2021  CA 27-29 stable at 371 on 4/14/2021    Trodelvy, cycle #9- 5/5/2021    On 2/25/2020 when she had biopsy of the right acetabulum and it was consistent with metastatic lobular carcinoma.  Again it was Triple Negative.     On 3/18/2020 when she also met with Dr. Arelis Arshad who also advised testing for androgen receptor studies on the biopsy  specimen.  Tumor was diffusely androgen receptor positive.    Interval history.  She comes into the clinic and tells me overall she is tolerating chemotherapy well.  Nausea is under good control.  Pain is well controlled with the current regimen.  No new swellings.  Scalp swelling is is stable to slightly bigger.  Neuropathy is a stable.  Energy is decent.  Bowel movements are good.  She lost a few pounds and she mentioned that she had migraines for a couple of days when she did not eat well but overall she has been eating well.    ECOG 0-1    ROS:  Rest of the comprehensive review of the system was essentially unremarkable.        I reviewed other history in epic as below.       PAST MEDICAL HISTORY:     1.  Breast cancer  2.  Multiple sclerosis.   3.  Depression.   4.  Migraines.   5.  Hypertension.   6.  Cholecystectomy and umbilical hernia repair.     SOCIAL HISTORY: She smoked for 5 years but quit many years ago.  Drinks alcohol socially.  She lives with her .  She is a teacher in middle school.       FAMILY HISTORY: She mentions that her mother had breast cancer at 49.  Both grandmothers had breast cancer but she is not sure of the age.  A couple of cousins have cancers.  Patient has 3 children who are healthy.    Current Outpatient Medications   Medication     acetaminophen (TYLENOL) 500 MG tablet     apixaban ANTICOAGULANT (ELIQUIS) 5 MG tablet     busPIRone (BUSPAR) 15 MG tablet     calcium citrate-vitamin D (CITRACAL) 315-250 MG-UNIT TABS     Cholecalciferol (VITAMIN D3 PO)     HEMP OIL OR EXTRACT OR OTHER CBD CANNABINOID, NOT MEDICAL CANNABIS,     LORazepam (ATIVAN) 1 MG tablet     magnesium 250 MG tablet     morphine (MS CONTIN) 15 MG CR tablet     morphine (MSIR) 15 MG IR tablet     OLANZapine (ZYPREXA) 5 MG tablet     promethazine (PHENERGAN) 25 MG tablet     propranolol ER (INDERAL LA) 80 MG 24 hr capsule     senna-docusate (SENOKOT-S/PERICOLACE) 8.6-50 MG tablet     traZODone (DESYREL) 50 MG  tablet     venlafaxine (EFFEXOR-XR) 75 MG 24 hr capsule     No current facility-administered medications for this visit.         Allergies   Allergen Reactions     Bactrim [Sulfamethoxazole W/Trimethoprim]      Internal bleeding     Copaxone [Glatiramer] Hives          PHYSICAL EXAMINATION:     There were no vitals taken for this visit.  Wt Readings from Last 4 Encounters:   04/21/21 64.1 kg (141 lb 4.8 oz)   04/14/21 62.4 kg (137 lb 9.6 oz)   04/12/21 62.1 kg (137 lb)   03/31/21 62.3 kg (137 lb 4.8 oz)       CONSTITUTIONAL: No apparent distress  EYES: PERRLA, without pallor or jaundice  ENT/MOUTH: Ears unremarkable. No oral lesions  CVS: s1s2 normal  RESPIRATORY: Chest is clear  GI: Abdomen is benign  NEURO: Alert and oriented ×3  INTEGUMENT: no concerning skin rashes.  I believe the swelling at the back of the scalp looks pretty similar to previously.  LYMPHATIC: no palpable lymphadenopathy  MUSCULOSKELETAL: Unremarkable. No bony tenderness.   EXTREMITIES: no pedal edema  PSYCH: Mentation, mood and affect are appropriate          Labs and Imaging.    Reviewed.    5/5/2021   CBC-WBC 3.6.  Platelets.  ANC 2.4.  CMP is unremarkable      CA 27-29 was stable at 371 on 4/14/2021          Biopsy from the right acetabulum shows metastatic lobular carcinoma.  It is triple negative.  Androgen receptor was diffusely positive (90%, moderate intensity) by immunohistochemistry. )  PD-L1 negative.      Foundation 1 testing did not reveal any actionable mutations.      2/5/2021.  PET/CT overall shows a good response to treatment with improvement of previously hypermetabolic lesions in the spine and pelvis and stability of other bone meta stasis.  There is a single lytic lesion in the right iliac bone which demonstrates slight Yimi increased FDG uptake and has SUV max 4.7 while previously it was SUV max 3.4.  There was diffuse wall thickening of the esophagus with uptake in the esophagus in the fundus of the stomach and this could  be seen with inflammation.      ASSESSMENT AND PLAN:   1.  History of stage IIB, T2 N1 M0 invasive lobular carcinoma of the right breast.  It was initially diagnosed in 2006 and at that time it was hormone receptor positive, HER-2 negative.  She was treated on ECOG 2104 protocol with dose-dense Adriamycin, Cytoxan and Avastin x4 cycles followed by Taxol and Avastin x4 cycles followed by a Avastin for 1 year.  She also received radiation to the right breast which she completed in January 2007 (5040 cGy).  She took Aromasin from 2007 to 2014.    Now she has metastatic breast cancer with extensive involvement of the bones.  There is also a left lower lobe hypermetabolic lesion and mediastinal lymph nodes which are hypermetabolic.  The biopsy from the right iliac bone is consistent with metastatic lobular breast cancer but it is hormone receptor and HER-2 negative and PDL 1 is also negative.    She has received 3000 cGy of palliative radiation to T12-L5 from 9/6/2019 till 9/18/2019.    She was started on palliative Xeloda but she was unable to tolerate even the dose reduced medication.        We decided on repeating scans and MRI brain on 10/25/2019 showed no intracranial metastatic disease.   Multiple enhancing calvarial lesions may be increased in number and are suggestive of metastatic disease. Thinner imaging on the current study may identify the smaller lesions and may be responsible for identified more lesions.   There is a single focus of T2 hyperintense signal within the anterior right temporal lobe may represent interval demyelination. There is no evidence for active demyelination.    PET/CT on 11/1/2019 showed favorable response to therapy and Overall FDG uptake within scattered osseous metastases is decreased since 8/30/2019, particularly within the pelvis. Increased sclerotic appearance of L1 and L4 pathologic compression deformities, likely sequela of recently completed palliative radiation therapy.    No  significant FDG uptake in previously demonstrated hypermetabolic mediastinal lymph nodes. Biopsy negative on 9/5/2019.  There is also decreased FDG uptake in the left lower lobe (biopsy negative for malignancy), now with dense consolidation containing air bronchograms suggestive of infection versus aspiration.  There is diffuse FDG uptake within the esophagus, consistent with esophagitis.    Because of intolerance to Xeloda we decided on switching to weekly paclitaxel on 11/5/2019.    For better tolerance we decreased the dose of paclitaxel to 70 mg/m  with cycle #2.  She has completed 3 cycles of Taxol and the last chemotherapy was on 1/14/2020.      PET/CT on 1/24/2020 showed progression of the disease in some of the bone lesions including increase in size and lytic lesion within the vertebral body of C4 measuring 1 cm as opposed to 0.6 cm previously and a new central soft tissue nodule with SUV max 4.1 when previously it was non-hypermetabolic.  There is also some progression noted in the right proximal femur and right iliac bone.  There is decreased metabolic uptake in the right lamina of T9 with SUV max 4.7 when previously it was 8.0.  Other lesions are stable.    There are no pathologically enlarged mediastinal, hilar or axillary lymph nodes. Calcified subcarinal lymph nodes. There are no suspicious lung nodules or evidence for infection and previous left lower lobe consolidation has resolved.     CA 27-29 also had increased significantly and it was 257 on 1/28/2020.    Due to progression we switched to eribulin on 1/28/2020.        After 3 cycles, she had a repeat PET/CT on 3/27/2020 which showed a stable size of the bone metastatic lesions although the FDG avidity was less, consistent with treatment response.    She had MRI of the thoracic spine on 4/3/2020 and it was compared to the one which was done in August 2019 and it showed increased size of several metastatic lesions most notably at T9 but also at  T5-T7.  Other lesions at T10, T11 and T12 appeared improved.    CA 27-29 was also down to 222 on 3/18/2020.    I believe overall this is consistent with a partial response to the treatment.  Overall she is tolerating eribulin well with some worsening of neuropathy and cytopenias.     We decided on continuing the chemotherapy with some modifications and she got cycle #4 with slightly decreased dose of eribulin to 1.2 mg/m2.      After 8 cycles of eribulin repeat PET CT on 7/17/2020 showed only minimally increased FDG uptake in T9 and T10 and in the left anterior fifth rib near the costochondral junction.  That is slightly decreased FDG avidity in the right femoral neck lesion.  Otherwise bone disease is a stable.  No other evidence of metastatic disease is found.    CA 27-29 on 6/30/2020 was 308.    We decided on continuing the same chemotherapy because overall clinical picture was consistent with stable to only minimally progressive disease and she was tolerating treatments well with decent quality of life.    Cycle #10 was delayed by 1 week because ANC was 0.9.        After 13 cycles of eribulin, PET scan on 11/6/2020 shows progression of the disease with increased FDG uptake in the lytic lesions in the T9/T10 and right posterolateral elements as well as increased FDG uptake in the previously known hypermetabolic right iliac bone lesion suggesting recurrence of previously treated metastasis.  There is new subtle lesion in the right manubrium.  There is also a new fracture in the anterolateral left fourth rib with associated uptake and this could be a pathologic fracture.  Healing fracture in the left anterior fifth rib lesion.  There is resolution of the left pulmonary emboli.  Decreased FDG uptake of the esophagus consistent with improving inflammation.    Because of progression of the disease, I recommend switching to recently FDA approved sacituzumab govitecan-hziy (Trodelvy) for TNBC, based on the results of  the phase II IMMU-132-01 clinical trial.   We discussed the rational schedule and potential side effects of it in detail.    We gave it to her on 11/11/2020.    She had significant nausea and vomiting with it but otherwise tolerated it well.   She started cycle #2 on 12/2/2020 and received day 8 on 12/8/2020.  She then got admitted for nausea vomiting dehydration and weakness.     We delayed the start of cycle number 3 x 1 week and decrease the dose of chemotherapy by 25% and she also has neutropenia with ANC of 1.  She started cycle number 3-day #1 on 12/29/2020.    CA 27-29 was 392.    Cycle number 3-day #8 1/5/2021.  She has been tolerating the reduced dose chemotherapy well.  After 4 cycles of chemotherapy, overall the PET scan shows positive response to treatment apart from mildly increased FDG avidity in one of the right iliac bone lesions.  CA 27-29 was 454 slightly elevated from before.    We decided on continuing the same chemotherapy.      She obtained a second opinion from Dr. Castillo from [facility] who recommended a repeat biopsy to be done to make sure that she really has triple negative breast cancer.      She also met with Dr. Arelis Arshad on 3/18/2021.    She had repeat biopsy done from the right acetabulum on 2/25/2021 which showed metastatic lobular breast cancer.  Hormonal studies, HER-2 FISH again showed that it is triple negative.  Androgen receptor is diffusely positive.    PD-L1 negative.  Foundation 1 testing did not reveal any actionable mutations.    Trodelvy is going well.  Last CA 27-29 was a stable at 371 on 4/14/2021.  Symptomatically she is doing well.  Plan to proceed with cycle #9 today.  I will repeat PET scan after cycle #10.      As the tumor is diffusely positive for androgen receptor, we will consider giving her androgen receptor blockers like Casodex or enzalutamide in the future.      Nausea/ vomiting.  Well controlled with Emend Decadron and Zofran.  She takes  Compazine as needed but she has not required this recently.       Bone disease.  She has metastatic disease to the bone.  Previously she got palliative radiation to T12-L5 3000 cGy in 10 fractions from 9/6/2019 till 9/18/2019.  She was evaluated by orthopedics Dr. Bipin Elliott on 11/1/2019 and conservative management was recommended.    She was on Zometa every 3 months. She last received on 9/29/2020.  Because of progression of the disease in the bones, we switched to Xgeva which she received on 11/11/2020.  She last got Xgeva on 4/14/2021.  She will receive it next week.  Continue calcium and vitamin D.  Plan to repeat PET/CT after cycle #10.        Pain management.  Pain is under decent control with morphine sulfate immediate release and MS Contin.  She is following with palliative care.    Leukopenia/Neutropenia.  We delayed chemotherapy by 1 week and also dose reduced it by 25% starting cycle #3.    Since then neutropenia has not been an issue.  She does have mild leukopenia with mild lymphocytopenia.  Continue to monitor.     Weight loss.  Advised her to increase protein in her diet.    Bilateral pulmonary emboli.  Continue Eliquis for incidentally found PE on 7/17/2020.       Neuropathy.  She has mild neuropathy in the hands.  It is same as before.  She does have chronic balance issues and heat and cold sensitivity from underlying multiple sclerosis and this has been a stable.          We did not address the following today.    Insomnia.  Continue trazodone.      Wall thickening of esophagus and FDG uptake of fundus of the stomach.  It could be in the setting of inflammation from recent nausea and vomiting.  Symptoms have resolved.  Continue to monitor clinically.      Subcutaneous nodule in the scalp.  I am not certain whether it is a cyst or a metastatic deposit.  PET scan does not cause this to light up.  Continue to observe.      Vision changes.    She was evaluated by ophthalmology and was noted to  have cystoid macular edema.  It was thought that this could be due to Taxol as patient reported to the ophthalmologist that she was on Taxol in September 2019 when her symptoms started.  But she was not on Taxol in September 2019 when she started noticing the visual changes so Taxol is not responsible for this.  The first dose of Taxol was on 11/5/2019.   She had left cataract extraction on 2/8/2021 and she has noticed significant improvement in her vision.  She plans to do cataract extraction of the right eye in couple of weeks.          Increased FDG ability in the colon.  She had FDG uptake in the cecum and ascending colon but no corresponding lesion was seen on the CT scan.  She is completely asymptomatic so at this time we will continue to observe.    Discussion regarding healthcare directives.  On previous visit she told me that she will bring the health care directive for our records but she forgot so I told her to bring it on next visit.      Breast implant removal.  She tells me that she was planning to do breast implant removal because there was a recall on this.  Her surgery was scheduled for 9/24/2019.  Because of this new development of metastatic breast cancer and her recent radiation, surgery has been canceled.  We discussed that at this time treatment for metastatic breast cancer would take precedence over the other surgery as the other surgery would require her to be off chemotherapy for several weeks and in that case the chances of cancer progression would be high and I would not recommend that.    Multiple sclerosis.  She will continue to follow with her neurologist Dr. Quiles.  Currently multiple sclerosis is under control and currently she is not taking any medications.      Return to clinic in 3 weeks to see nurse practitioner.  I will see her back in 6 weeks.    All of her questions were answered to her satisfaction.  She is agreeable and comfortable with the plan.  Dewayne Zepeda,  MD              Again, thank you for allowing me to participate in the care of your patient.        Sincerely,        Dewayne Zepeda MD

## 2021-05-10 DIAGNOSIS — C50.919 METASTATIC BREAST CANCER: ICD-10-CM

## 2021-05-10 DIAGNOSIS — C79.51 BONE METASTASES: Primary | ICD-10-CM

## 2021-05-10 DIAGNOSIS — I26.99 ACUTE PULMONARY EMBOLISM WITHOUT ACUTE COR PULMONALE, UNSPECIFIED PULMONARY EMBOLISM TYPE (H): ICD-10-CM

## 2021-05-10 NOTE — TELEPHONE ENCOUNTER
SUBJECTIVE/OBJECTIVE:                                                    Refill request receive for:  apixaban ANTICOAGULANT (ELIQUIS) 5 MG tablet    Last refill per fax: 02/03/21  Date of LOV r/t Medication: 05/05/21 with Dr. Zepeda  Future appt scheduled? Yes: 05/26/21 with Annie Bailon   Date faxed to clinic: 05/08/21    RECENT LABS/VITALS                                                      Recent Labs   Lab Test 12/29/16  0946   CHOL 291*   HDL 52   *   TRIG 172*     Lab Results   Component Value Date    AST 22 05/05/2021     Lab Results   Component Value Date    ALT 29 05/05/2021     Lab Results   Component Value Date    A1C 5.4 12/29/2016     Creatinine   Date Value Ref Range Status   05/05/2021 0.66 0.52 - 1.04 mg/dL Final   ]  Potassium   Date Value Ref Range Status   05/05/2021 3.6 3.4 - 5.3 mmol/L Final   ]  TSH   Date Value Ref Range Status   12/08/2014 1.11 mcU/mL Final     BP Readings from Last 4 Encounters:   05/05/21 116/89   04/21/21 (!) 138/94   04/14/21 117/70   03/31/21 110/71       PLAN:                                                      Sent to provider to advise.    Rocío James CMA

## 2021-05-11 DIAGNOSIS — G89.3 CANCER ASSOCIATED PAIN: ICD-10-CM

## 2021-05-11 DIAGNOSIS — C50.919 METASTATIC BREAST CANCER: ICD-10-CM

## 2021-05-11 RX ORDER — MORPHINE SULFATE 15 MG/1
15-30 TABLET ORAL
Qty: 60 TABLET | Refills: 0 | Status: SHIPPED | OUTPATIENT
Start: 2021-05-11 | End: 2021-05-19

## 2021-05-12 ENCOUNTER — INFUSION THERAPY VISIT (OUTPATIENT)
Dept: INFUSION THERAPY | Facility: CLINIC | Age: 61
End: 2021-05-12
Payer: COMMERCIAL

## 2021-05-12 VITALS
DIASTOLIC BLOOD PRESSURE: 96 MMHG | BODY MASS INDEX: 25.81 KG/M2 | HEART RATE: 86 BPM | RESPIRATION RATE: 14 BRPM | SYSTOLIC BLOOD PRESSURE: 141 MMHG | OXYGEN SATURATION: 100 % | TEMPERATURE: 98.2 F | WEIGHT: 141.1 LBS

## 2021-05-12 DIAGNOSIS — C50.912 BILATERAL MALIGNANT NEOPLASM OF BREAST IN FEMALE, UNSPECIFIED ESTROGEN RECEPTOR STATUS, UNSPECIFIED SITE OF BREAST (H): ICD-10-CM

## 2021-05-12 DIAGNOSIS — C50.911 BILATERAL MALIGNANT NEOPLASM OF BREAST IN FEMALE, UNSPECIFIED ESTROGEN RECEPTOR STATUS, UNSPECIFIED SITE OF BREAST (H): ICD-10-CM

## 2021-05-12 DIAGNOSIS — E87.6 HYPOKALEMIA: Primary | ICD-10-CM

## 2021-05-12 DIAGNOSIS — C50.919 METASTATIC BREAST CANCER: ICD-10-CM

## 2021-05-12 DIAGNOSIS — C79.51 BONE METASTASES: ICD-10-CM

## 2021-05-12 DIAGNOSIS — D70.1 CHEMOTHERAPY-INDUCED NEUTROPENIA (H): ICD-10-CM

## 2021-05-12 DIAGNOSIS — T45.1X5A CHEMOTHERAPY-INDUCED NEUTROPENIA (H): ICD-10-CM

## 2021-05-12 LAB
ALBUMIN SERPL-MCNC: 3.4 G/DL (ref 3.4–5)
ALP SERPL-CCNC: 54 U/L (ref 40–150)
ALT SERPL W P-5'-P-CCNC: 28 U/L (ref 0–50)
ANION GAP SERPL CALCULATED.3IONS-SCNC: 7 MMOL/L (ref 3–14)
AST SERPL W P-5'-P-CCNC: 23 U/L (ref 0–45)
BASOPHILS # BLD AUTO: 0 10E9/L (ref 0–0.2)
BASOPHILS NFR BLD AUTO: 0.7 %
BILIRUB SERPL-MCNC: 0.3 MG/DL (ref 0.2–1.3)
BUN SERPL-MCNC: 15 MG/DL (ref 7–30)
CALCIUM SERPL-MCNC: 9.2 MG/DL (ref 8.5–10.1)
CHLORIDE SERPL-SCNC: 106 MMOL/L (ref 94–109)
CO2 SERPL-SCNC: 25 MMOL/L (ref 20–32)
CREAT SERPL-MCNC: 0.71 MG/DL (ref 0.52–1.04)
DIFFERENTIAL METHOD BLD: ABNORMAL
EOSINOPHIL # BLD AUTO: 0.1 10E9/L (ref 0–0.7)
EOSINOPHIL NFR BLD AUTO: 1 %
ERYTHROCYTE [DISTWIDTH] IN BLOOD BY AUTOMATED COUNT: 14.4 % (ref 10–15)
GFR SERPL CREATININE-BSD FRML MDRD: >90 ML/MIN/{1.73_M2}
GLUCOSE SERPL-MCNC: 170 MG/DL (ref 70–99)
HCT VFR BLD AUTO: 38.8 % (ref 35–47)
HGB BLD-MCNC: 12.5 G/DL (ref 11.7–15.7)
IMM GRANULOCYTES # BLD: 0.1 10E9/L (ref 0–0.4)
IMM GRANULOCYTES NFR BLD: 1.3 %
LYMPHOCYTES # BLD AUTO: 0.5 10E9/L (ref 0.8–5.3)
LYMPHOCYTES NFR BLD AUTO: 8.8 %
MAGNESIUM SERPL-MCNC: 2 MG/DL (ref 1.6–2.3)
MCH RBC QN AUTO: 28.9 PG (ref 26.5–33)
MCHC RBC AUTO-ENTMCNC: 32.2 G/DL (ref 31.5–36.5)
MCV RBC AUTO: 90 FL (ref 78–100)
MONOCYTES # BLD AUTO: 0.5 10E9/L (ref 0–1.3)
MONOCYTES NFR BLD AUTO: 8.3 %
NEUTROPHILS # BLD AUTO: 4.8 10E9/L (ref 1.6–8.3)
NEUTROPHILS NFR BLD AUTO: 79.9 %
PHOSPHATE SERPL-MCNC: 3.5 MG/DL (ref 2.5–4.5)
PLATELET # BLD AUTO: 225 10E9/L (ref 150–450)
POTASSIUM SERPL-SCNC: 3.6 MMOL/L (ref 3.4–5.3)
PROT SERPL-MCNC: 6.6 G/DL (ref 6.8–8.8)
RBC # BLD AUTO: 4.33 10E12/L (ref 3.8–5.2)
SODIUM SERPL-SCNC: 138 MMOL/L (ref 133–144)
WBC # BLD AUTO: 6 10E9/L (ref 4–11)

## 2021-05-12 PROCEDURE — 99207 PR NO CHARGE LOS: CPT

## 2021-05-12 PROCEDURE — 96375 TX/PRO/DX INJ NEW DRUG ADDON: CPT | Performed by: NURSE PRACTITIONER

## 2021-05-12 PROCEDURE — 96372 THER/PROPH/DIAG INJ SC/IM: CPT | Mod: 59 | Performed by: NURSE PRACTITIONER

## 2021-05-12 PROCEDURE — 80053 COMPREHEN METABOLIC PANEL: CPT | Performed by: INTERNAL MEDICINE

## 2021-05-12 PROCEDURE — 96367 TX/PROPH/DG ADDL SEQ IV INF: CPT | Performed by: NURSE PRACTITIONER

## 2021-05-12 PROCEDURE — S0028 INJECTION, FAMOTIDINE, 20 MG: HCPCS | Performed by: NURSE PRACTITIONER

## 2021-05-12 PROCEDURE — 84100 ASSAY OF PHOSPHORUS: CPT | Performed by: INTERNAL MEDICINE

## 2021-05-12 PROCEDURE — 83735 ASSAY OF MAGNESIUM: CPT | Performed by: INTERNAL MEDICINE

## 2021-05-12 PROCEDURE — 99207 PR NO CHARGE NURSE ONLY: CPT

## 2021-05-12 PROCEDURE — 96413 CHEMO IV INFUSION 1 HR: CPT | Performed by: NURSE PRACTITIONER

## 2021-05-12 PROCEDURE — 85025 COMPLETE CBC W/AUTO DIFF WBC: CPT | Performed by: INTERNAL MEDICINE

## 2021-05-12 RX ORDER — HEPARIN SODIUM (PORCINE) LOCK FLUSH IV SOLN 100 UNIT/ML 100 UNIT/ML
5 SOLUTION INTRAVENOUS
Status: DISCONTINUED | OUTPATIENT
Start: 2021-05-12 | End: 2021-05-12 | Stop reason: HOSPADM

## 2021-05-12 RX ORDER — ACETAMINOPHEN 325 MG/1
650 TABLET ORAL ONCE
Status: COMPLETED | OUTPATIENT
Start: 2021-05-12 | End: 2021-05-12

## 2021-05-12 RX ORDER — HEPARIN SODIUM (PORCINE) LOCK FLUSH IV SOLN 100 UNIT/ML 100 UNIT/ML
500 SOLUTION INTRAVENOUS ONCE
Status: COMPLETED | OUTPATIENT
Start: 2021-05-12 | End: 2021-05-12

## 2021-05-12 RX ORDER — DIPHENHYDRAMINE HCL 25 MG
50 CAPSULE ORAL ONCE
Status: COMPLETED | OUTPATIENT
Start: 2021-05-12 | End: 2021-05-12

## 2021-05-12 RX ADMIN — HEPARIN SODIUM (PORCINE) LOCK FLUSH IV SOLN 100 UNIT/ML 500 UNITS: 100 SOLUTION at 12:14

## 2021-05-12 RX ADMIN — HEPARIN SODIUM (PORCINE) LOCK FLUSH IV SOLN 100 UNIT/ML 5 ML: 100 SOLUTION at 15:32

## 2021-05-12 RX ADMIN — Medication 50 MG: at 13:02

## 2021-05-12 RX ADMIN — Medication 500 ML: at 12:58

## 2021-05-12 RX ADMIN — ACETAMINOPHEN 650 MG: 325 TABLET ORAL at 13:02

## 2021-05-12 ASSESSMENT — PAIN SCALES - GENERAL: PAINLEVEL: NO PAIN (0)

## 2021-05-12 NOTE — PROGRESS NOTES
"Infusion Nursing Note:  Shaneka Alvarez presents today for C9D8 Trodelvy infusion and Xgeva injection.    Patient seen by provider today: No   present during visit today: Not Applicable.    Note:   Patient states she is doing \"wonderful, just wonderful.\" Feeling well and looking forward to getting together with family.    Intravenous Access:  Implanted Port.    Treatment Conditions:  Lab Results   Component Value Date    HGB 12.5 05/12/2021     Lab Results   Component Value Date    WBC 6.0 05/12/2021      Lab Results   Component Value Date    ANEU 4.8 05/12/2021     Lab Results   Component Value Date     05/12/2021      Lab Results   Component Value Date     05/12/2021                   Lab Results   Component Value Date    POTASSIUM 3.6 05/12/2021           Lab Results   Component Value Date    MAG 2.0 05/12/2021            Lab Results   Component Value Date    CR 0.71 05/12/2021                   Lab Results   Component Value Date    RAFAELA 9.2 05/12/2021                Lab Results   Component Value Date    BILITOTAL 0.3 05/12/2021           Lab Results   Component Value Date    ALBUMIN 3.4 05/12/2021                    Lab Results   Component Value Date    ALT 28 05/12/2021           Lab Results   Component Value Date    AST 23 05/12/2021       Results reviewed, labs MET treatment parameters, ok to proceed with treatment.      Post Infusion Assessment:  Patient tolerated infusion without incident.  Patient observed for 30 minutes post Trodelvy per protocol.  Blood return noted pre and post infusion.  Site patent and intact, free from redness, edema or discomfort.  No evidence of extravasations.  Access discontinued per protocol.       Discharge Plan:   AVS to patient via MYCHART.  Patient will return 5/26/21 for next appointment.   Patient discharged in stable condition accompanied by: self.  Departure Mode: Ambulatory.    Lauren Shaw RN                      "

## 2021-05-19 DIAGNOSIS — G89.3 CANCER ASSOCIATED PAIN: ICD-10-CM

## 2021-05-19 DIAGNOSIS — C50.919 METASTATIC BREAST CANCER: ICD-10-CM

## 2021-05-19 RX ORDER — MORPHINE SULFATE 15 MG/1
15-30 TABLET ORAL
Qty: 60 TABLET | Refills: 0 | Status: SHIPPED | OUTPATIENT
Start: 2021-05-19 | End: 2021-06-09

## 2021-05-19 RX ORDER — MORPHINE SULFATE 15 MG/1
15 TABLET, FILM COATED, EXTENDED RELEASE ORAL EVERY EVENING
Qty: 30 TABLET | Refills: 0 | Status: SHIPPED | OUTPATIENT
Start: 2021-05-19 | End: 2021-06-09

## 2021-05-19 NOTE — TELEPHONE ENCOUNTER
Received telephone call from patient requesting refill of morphine IR and morphine ER.     Last refill:   Morphine IR: 5/12  Morphine ER: 4/20  Last office visit: 3/29/21  Scheduled for follow up 6/28/21     Will route request to MD for review.     Reviewed [place]  Report.

## 2021-05-25 NOTE — PROGRESS NOTES
Oncology Follow Up Visit: May 26, 2021     Oncologist: Dr Dewayne Zepeda  PCP: Mohit Barcenas    Diagnosis: Metastatic Breast Cancer to brain and spine  Shaneka Alvarez is a 59 yo female who was diagnosed with Stage IIB, T2 N1 M0 invasive lobular carcinoma of the right breast  In January 2006.  Final pathology showed a 4.5 x 3.8 x 2.5 cm, 01/14 lymph nodes positive.  Estrogen, progesterone receptor positive, HER-2 negative.     Genetics- BRCA1 and 2 mutations not detected. 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  There was no evidence of new multiple sclerosis lesions.  PET scan on 8/30/2019 which showed widespread bone metastatic lesions (calvarium, spine, sacrum, pelvis, ribs most prominent at C7, T3, L1 and L4), hypermetabolic 3 cm lesion in LLL, and mediastinal/hilar LN. CEA wnl at 1.9 and C27-29 elevated to 415 (28 on 8/23/16). A CT guided biopsy of the right iliac bone was obtained on 9/4/19, pathology consistent with metastatic adenocarcinoma (breast), ER/SC negative, HER-2 negative PDL 1 < 1%. A second biopsy was take of the LLL nodule via EBUS on 9/5/19 did not show any malignancy.  C/T/L spine MRI 8/3/19 to 9/2/19 with numerous enhancing lesions of the C, T and L spine, largest around T9 and L1. No evidence of cord compression. Has a L1 compression fracture and impending L4. completed radiation therapy to T12-L5.-Neurosurgery recommended no surgical intervention but to wear [place] brace when out of bed and HOB >30 degrees  Treatment:   2006:  ECOG 2104 protocol of dose-dense Adriamycin, Cytoxan and Avastin x4 cycles followed by Taxol and Avastin x4 cycles.   03/2007:  Completed 1 year Avastin.   11/2006-01/2007:  Radiotherapy to the right breast of 5040 cGy.   [id number]:  Aromasin.  Stopped after moving to the [place].   01/2016:  Right  modified radical mastectomy with latissimus dorsi flap reconstruction and a left prophylactic mastectomy with latissimus dorsi flap reconstruction.   9/6/2019 till 9/18/2019 Received radiation T12-L5 3000 cGy in 10 fractions   -Neurosurgery recommended no surgical intervention but to wear [place] brace when out of bed and HOB >30 degrees  9/18/2019 completed T12-L5 radiation in 10 fractions =3000 cGy  10/1/2019 Xeloda x 2 trials lowering dose to 1000 mg bid  10/23/2019 Began every 3 month Zometa  11/5/2019- 1/14/2020  weekly paclitaxel x 3 cycles  1/28-10/2020 Eribulin  11/11/2020 Trodelvy  Repeat biopsy done from the right acetabulum on 2/25/2021 which showed metastatic lobular breast cancer.  Hormonal studies, HER-2 FISH again showed that it is triple negative.  Androgen receptor is diffusely positive.    PD-L1 negative.  Foundation 1 testing did not reveal any actionable mutations    Interval History: Ms. Alvarez is contacted today prior to cycle 10 of Trodelvy. Pt is reporting she is tolerating the medication well with side effects of some constipation with use of senna S. She is otherwise doing well with no new issues today. Back pain being covered by MS Contin with good support. She is exercising regularly and states she has just gotten a new bike. She is maintaining weight and no new complaints. Has had covid 19 vaccinations. .   Rest of comprehensive and complete ROS is reviewed and is negative.   Past Medical History:   Diagnosis Date     Breast cancer (H)     Age 44     Cataract     left eye     Chemotherapy induced nausea and vomiting 12/15/2020     Common migraine      Esophageal reflux      H/O bilateral mastectomy      Mild major depression (H)      Multiple sclerosis (H)      Pulmonary embolism (H)      Current Outpatient Medications   Medication     acetaminophen (TYLENOL) 500 MG tablet     apixaban ANTICOAGULANT (ELIQUIS) 5 MG tablet     busPIRone (BUSPAR) 15 MG tablet     calcium citrate-vitamin D  (CITRACAL) 315-250 MG-UNIT TABS     Cholecalciferol (VITAMIN D3 PO)     HEMP OIL OR EXTRACT OR OTHER CBD CANNABINOID, NOT MEDICAL CANNABIS,     LORazepam (ATIVAN) 1 MG tablet     magnesium 250 MG tablet     morphine (MS CONTIN) 15 MG CR tablet     morphine (MSIR) 15 MG IR tablet     OLANZapine (ZYPREXA) 5 MG tablet     promethazine (PHENERGAN) 25 MG tablet     propranolol ER (INDERAL LA) 80 MG 24 hr capsule     senna-docusate (SENOKOT-S/PERICOLACE) 8.6-50 MG tablet     traZODone (DESYREL) 50 MG tablet     venlafaxine (EFFEXOR-XR) 75 MG 24 hr capsule     No current facility-administered medications for this visit.      Allergies   Allergen Reactions     Bactrim [Sulfamethoxazole W/Trimethoprim]      Internal bleeding     Copaxone [Glatiramer] Hives       Physical Exam:BP (!) 125/91   Pulse 108   Temp 98.6  F (37  C) (Oral)   Resp 14   Wt 62.6 kg (138 lb)   SpO2 100%   BMI 25.24 kg/m     Constitutional: Alert, healthy, and in no distress.   ENT: Eyes bright, No mouth sores  Neck: Supple, No adenopathy.Thyroid symmetric  Cardiac: Heart rate and rhythm is regular and strong without murmur  Respiratory: Breathing easy. Lung sounds clear to auscultation  Abdomen: Soft, non-tender, BS normal. No masses or organomegaly  MS: Muscle tone normal, extremities normal with no edema. Walking  Skin: No suspicious lesions or rashes  Neuro: Sensory grossly WNL, gait normal.   Lymph: Normal ant/post cervical, axillary, supraclavicular nodes  Psych: Mentation appears normal and affect normal/bright and smiling    Laboratory Results:   No results found for any visits on 05/26/21.- will be completed with infusion visit.     Assessment and Plan:   Metastatic Breast Cancer to brain and spine-Pt is due to continue with Trudelvy cycle 10. Pt is tolerating well with review, labs and exam. She will continue with plan as set.   We will have her return in 3 weeks for review prior to continuation of next cycle. .  Back pain/bone metastasis-   Pt previously completed radiation therapy to T12-L5 1 year ago. Continues with palliative care involvement and oversite with use of MS Contin 15mg nightly with ibuprofen and MSIR available as needed . She is able to get good rest and stay active by day with this plan.   Bone mets-. She continues using Zometa every 3 months which is due with next cycle.  She confirmed use of calcium plus vitamin D for support.  Given praise for effort for her effort to get into biking.   Bilateral pulmonary embolism-  Found 7/2020 with imaging. Continues on Eliquis-no new symptoms of concern.   Constipation- using Senna S for support and reminded of fluids and exercise to help with this problem.   Anxiety/Depression-continues using BuSpar for help with the anxiety and has trazodone  MS - currently not on treatment for her MS - no new or recurrent symptoms- has not seen neurology for > 1 year.   The total time of this encounter amounted to 30 minutes. This time included face to face time spent with the patient, prep work, ordering tests, and performing post visit documentation.  Annie Bailon,Cnp

## 2021-05-26 ENCOUNTER — INFUSION THERAPY VISIT (OUTPATIENT)
Dept: INFUSION THERAPY | Facility: CLINIC | Age: 61
End: 2021-05-26
Attending: INTERNAL MEDICINE
Payer: COMMERCIAL

## 2021-05-26 ENCOUNTER — VIRTUAL VISIT (OUTPATIENT)
Dept: ONCOLOGY | Facility: CLINIC | Age: 61
End: 2021-05-26
Payer: COMMERCIAL

## 2021-05-26 ENCOUNTER — HOSPITAL ENCOUNTER (OUTPATIENT)
Facility: CLINIC | Age: 61
Setting detail: SPECIMEN
Discharge: HOME | End: 2021-05-26
Attending: INTERNAL MEDICINE | Admitting: INTERNAL MEDICINE
Payer: COMMERCIAL

## 2021-05-26 ENCOUNTER — INFUSION THERAPY VISIT (OUTPATIENT)
Dept: INFUSION THERAPY | Facility: CLINIC | Age: 61
End: 2021-05-26
Payer: COMMERCIAL

## 2021-05-26 VITALS
HEART RATE: 108 BPM | SYSTOLIC BLOOD PRESSURE: 125 MMHG | DIASTOLIC BLOOD PRESSURE: 91 MMHG | BODY MASS INDEX: 25.24 KG/M2 | TEMPERATURE: 98.6 F | OXYGEN SATURATION: 100 % | RESPIRATION RATE: 14 BRPM | WEIGHT: 138 LBS

## 2021-05-26 VITALS
TEMPERATURE: 98.4 F | SYSTOLIC BLOOD PRESSURE: 119 MMHG | HEART RATE: 92 BPM | RESPIRATION RATE: 16 BRPM | DIASTOLIC BLOOD PRESSURE: 79 MMHG

## 2021-05-26 DIAGNOSIS — E87.6 HYPOKALEMIA: ICD-10-CM

## 2021-05-26 DIAGNOSIS — K59.03 DRUG-INDUCED CONSTIPATION: ICD-10-CM

## 2021-05-26 DIAGNOSIS — D70.1 CHEMOTHERAPY-INDUCED NEUTROPENIA (H): ICD-10-CM

## 2021-05-26 DIAGNOSIS — C50.919 METASTATIC BREAST CANCER: ICD-10-CM

## 2021-05-26 DIAGNOSIS — C50.911 BILATERAL MALIGNANT NEOPLASM OF BREAST IN FEMALE, UNSPECIFIED ESTROGEN RECEPTOR STATUS, UNSPECIFIED SITE OF BREAST (H): ICD-10-CM

## 2021-05-26 DIAGNOSIS — D70.1 CHEMOTHERAPY-INDUCED NEUTROPENIA (H): Primary | ICD-10-CM

## 2021-05-26 DIAGNOSIS — C50.912 BILATERAL MALIGNANT NEOPLASM OF BREAST IN FEMALE, UNSPECIFIED ESTROGEN RECEPTOR STATUS, UNSPECIFIED SITE OF BREAST (H): ICD-10-CM

## 2021-05-26 DIAGNOSIS — T45.1X5A CHEMOTHERAPY-INDUCED NEUTROPENIA (H): ICD-10-CM

## 2021-05-26 DIAGNOSIS — I26.99 PULMONARY EMBOLISM WITHOUT ACUTE COR PULMONALE, UNSPECIFIED CHRONICITY, UNSPECIFIED PULMONARY EMBOLISM TYPE (H): Primary | ICD-10-CM

## 2021-05-26 DIAGNOSIS — T45.1X5A CHEMOTHERAPY-INDUCED NEUTROPENIA (H): Primary | ICD-10-CM

## 2021-05-26 DIAGNOSIS — C79.51 BONE METASTASES: ICD-10-CM

## 2021-05-26 LAB
ALBUMIN SERPL-MCNC: 3.8 G/DL (ref 3.4–5)
ALP SERPL-CCNC: 66 U/L (ref 40–150)
ALT SERPL W P-5'-P-CCNC: 24 U/L (ref 0–50)
ANION GAP SERPL CALCULATED.3IONS-SCNC: 5 MMOL/L (ref 3–14)
AST SERPL W P-5'-P-CCNC: 21 U/L (ref 0–45)
BASOPHILS # BLD AUTO: 0 10E9/L (ref 0–0.2)
BASOPHILS NFR BLD AUTO: 0.7 %
BILIRUB SERPL-MCNC: 0.5 MG/DL (ref 0.2–1.3)
BUN SERPL-MCNC: 6 MG/DL (ref 7–30)
CALCIUM SERPL-MCNC: 9.2 MG/DL (ref 8.5–10.1)
CANCER AG27-29 SERPL-ACNC: 545 U/ML (ref 0–39)
CHLORIDE SERPL-SCNC: 111 MMOL/L (ref 94–109)
CO2 SERPL-SCNC: 23 MMOL/L (ref 20–32)
CREAT SERPL-MCNC: 0.61 MG/DL (ref 0.52–1.04)
DIFFERENTIAL METHOD BLD: ABNORMAL
EOSINOPHIL # BLD AUTO: 0 10E9/L (ref 0–0.7)
EOSINOPHIL NFR BLD AUTO: 0.9 %
ERYTHROCYTE [DISTWIDTH] IN BLOOD BY AUTOMATED COUNT: 14.2 % (ref 10–15)
GFR SERPL CREATININE-BSD FRML MDRD: >90 ML/MIN/{1.73_M2}
GLUCOSE SERPL-MCNC: 113 MG/DL (ref 70–99)
HCT VFR BLD AUTO: 41.8 % (ref 35–47)
HGB BLD-MCNC: 13.3 G/DL (ref 11.7–15.7)
IMM GRANULOCYTES # BLD: 0 10E9/L (ref 0–0.4)
IMM GRANULOCYTES NFR BLD: 0.2 %
LYMPHOCYTES # BLD AUTO: 0.5 10E9/L (ref 0.8–5.3)
LYMPHOCYTES NFR BLD AUTO: 11.2 %
MAGNESIUM SERPL-MCNC: 2 MG/DL (ref 1.6–2.3)
MCH RBC QN AUTO: 28.3 PG (ref 26.5–33)
MCHC RBC AUTO-ENTMCNC: 31.8 G/DL (ref 31.5–36.5)
MCV RBC AUTO: 89 FL (ref 78–100)
MONOCYTES # BLD AUTO: 0.5 10E9/L (ref 0–1.3)
MONOCYTES NFR BLD AUTO: 11.9 %
NEUTROPHILS # BLD AUTO: 3.3 10E9/L (ref 1.6–8.3)
NEUTROPHILS NFR BLD AUTO: 75.1 %
NRBC # BLD AUTO: 0 10*3/UL
NRBC BLD AUTO-RTO: 0 /100
PHOSPHATE SERPL-MCNC: 2.4 MG/DL (ref 2.5–4.5)
PLATELET # BLD AUTO: 205 10E9/L (ref 150–450)
POTASSIUM SERPL-SCNC: 3.5 MMOL/L (ref 3.4–5.3)
PROT SERPL-MCNC: 7.4 G/DL (ref 6.8–8.8)
RBC # BLD AUTO: 4.7 10E12/L (ref 3.8–5.2)
SODIUM SERPL-SCNC: 139 MMOL/L (ref 133–144)
WBC # BLD AUTO: 4.4 10E9/L (ref 4–11)

## 2021-05-26 PROCEDURE — 96367 TX/PROPH/DG ADDL SEQ IV INF: CPT

## 2021-05-26 PROCEDURE — 96413 CHEMO IV INFUSION 1 HR: CPT

## 2021-05-26 PROCEDURE — G0463 HOSPITAL OUTPT CLINIC VISIT: HCPCS | Mod: 25

## 2021-05-26 PROCEDURE — 258N000003 HC RX IP 258 OP 636: Performed by: NURSE PRACTITIONER

## 2021-05-26 PROCEDURE — 83735 ASSAY OF MAGNESIUM: CPT

## 2021-05-26 PROCEDURE — 96375 TX/PRO/DX INJ NEW DRUG ADDON: CPT

## 2021-05-26 PROCEDURE — 99215 OFFICE O/P EST HI 40 MIN: CPT | Performed by: NURSE PRACTITIONER

## 2021-05-26 PROCEDURE — 250N000011 HC RX IP 250 OP 636: Performed by: NURSE PRACTITIONER

## 2021-05-26 PROCEDURE — 250N000013 HC RX MED GY IP 250 OP 250 PS 637: Performed by: NURSE PRACTITIONER

## 2021-05-26 PROCEDURE — 80053 COMPREHEN METABOLIC PANEL: CPT

## 2021-05-26 PROCEDURE — 85025 COMPLETE CBC W/AUTO DIFF WBC: CPT

## 2021-05-26 PROCEDURE — 36593 DECLOT VASCULAR DEVICE: CPT

## 2021-05-26 PROCEDURE — 84100 ASSAY OF PHOSPHORUS: CPT

## 2021-05-26 PROCEDURE — 86300 IMMUNOASSAY TUMOR CA 15-3: CPT | Performed by: NURSE PRACTITIONER

## 2021-05-26 RX ORDER — LORAZEPAM 2 MG/ML
0.5 INJECTION INTRAMUSCULAR EVERY 4 HOURS PRN
Status: CANCELLED
Start: 2021-05-26

## 2021-05-26 RX ORDER — DIPHENHYDRAMINE HCL 25 MG
50 CAPSULE ORAL ONCE
Status: CANCELLED | OUTPATIENT
Start: 2021-05-26

## 2021-05-26 RX ORDER — ACETAMINOPHEN 325 MG/1
650 TABLET ORAL ONCE
Status: CANCELLED | OUTPATIENT
Start: 2021-05-26

## 2021-05-26 RX ORDER — SODIUM CHLORIDE 9 MG/ML
1000 INJECTION, SOLUTION INTRAVENOUS CONTINUOUS PRN
Status: CANCELLED
Start: 2021-05-26

## 2021-05-26 RX ORDER — ATROPINE SULFATE 0.4 MG/ML
0.4 AMPUL (ML) INJECTION
Status: CANCELLED | OUTPATIENT
Start: 2021-06-02

## 2021-05-26 RX ORDER — NALOXONE HYDROCHLORIDE 0.4 MG/ML
.1-.4 INJECTION, SOLUTION INTRAMUSCULAR; INTRAVENOUS; SUBCUTANEOUS
Status: CANCELLED | OUTPATIENT
Start: 2021-05-26

## 2021-05-26 RX ORDER — ATROPINE SULFATE 0.4 MG/ML
0.4 AMPUL (ML) INJECTION
Status: CANCELLED | OUTPATIENT
Start: 2021-05-26

## 2021-05-26 RX ORDER — ALBUTEROL SULFATE 90 UG/1
1-2 AEROSOL, METERED RESPIRATORY (INHALATION)
Status: CANCELLED
Start: 2021-06-02

## 2021-05-26 RX ORDER — DIPHENHYDRAMINE HYDROCHLORIDE 50 MG/ML
50 INJECTION INTRAMUSCULAR; INTRAVENOUS
Status: CANCELLED
Start: 2021-06-02

## 2021-05-26 RX ORDER — ALBUTEROL SULFATE 0.83 MG/ML
2.5 SOLUTION RESPIRATORY (INHALATION)
Status: CANCELLED | OUTPATIENT
Start: 2021-06-02

## 2021-05-26 RX ORDER — DIPHENHYDRAMINE HCL 25 MG
50 CAPSULE ORAL ONCE
Status: CANCELLED | OUTPATIENT
Start: 2021-06-02

## 2021-05-26 RX ORDER — ACETAMINOPHEN 325 MG/1
650 TABLET ORAL ONCE
Status: CANCELLED | OUTPATIENT
Start: 2021-06-02

## 2021-05-26 RX ORDER — LORAZEPAM 2 MG/ML
0.5 INJECTION INTRAMUSCULAR EVERY 4 HOURS PRN
Status: CANCELLED
Start: 2021-06-02

## 2021-05-26 RX ORDER — NALOXONE HYDROCHLORIDE 0.4 MG/ML
.1-.4 INJECTION, SOLUTION INTRAMUSCULAR; INTRAVENOUS; SUBCUTANEOUS
Status: CANCELLED | OUTPATIENT
Start: 2021-06-02

## 2021-05-26 RX ORDER — HEPARIN SODIUM,PORCINE 10 UNIT/ML
5 VIAL (ML) INTRAVENOUS
Status: CANCELLED | OUTPATIENT
Start: 2021-06-02

## 2021-05-26 RX ORDER — HEPARIN SODIUM (PORCINE) LOCK FLUSH IV SOLN 100 UNIT/ML 100 UNIT/ML
5 SOLUTION INTRAVENOUS
Status: CANCELLED | OUTPATIENT
Start: 2021-06-02

## 2021-05-26 RX ORDER — ALBUTEROL SULFATE 90 UG/1
1-2 AEROSOL, METERED RESPIRATORY (INHALATION)
Status: CANCELLED
Start: 2021-05-26

## 2021-05-26 RX ORDER — HEPARIN SODIUM (PORCINE) LOCK FLUSH IV SOLN 100 UNIT/ML 100 UNIT/ML
5 SOLUTION INTRAVENOUS
Status: CANCELLED | OUTPATIENT
Start: 2021-05-26

## 2021-05-26 RX ORDER — MEPERIDINE HYDROCHLORIDE 25 MG/ML
25 INJECTION INTRAMUSCULAR; INTRAVENOUS; SUBCUTANEOUS EVERY 30 MIN PRN
Status: CANCELLED | OUTPATIENT
Start: 2021-06-02

## 2021-05-26 RX ORDER — DIPHENHYDRAMINE HCL 25 MG
50 CAPSULE ORAL ONCE
Status: COMPLETED | OUTPATIENT
Start: 2021-05-26 | End: 2021-05-26

## 2021-05-26 RX ORDER — HEPARIN SODIUM,PORCINE 10 UNIT/ML
5 VIAL (ML) INTRAVENOUS
Status: CANCELLED | OUTPATIENT
Start: 2021-05-26

## 2021-05-26 RX ORDER — DIPHENHYDRAMINE HYDROCHLORIDE 50 MG/ML
50 INJECTION INTRAMUSCULAR; INTRAVENOUS
Status: CANCELLED
Start: 2021-05-26

## 2021-05-26 RX ORDER — ACETAMINOPHEN 325 MG/1
650 TABLET ORAL ONCE
Status: COMPLETED | OUTPATIENT
Start: 2021-05-26 | End: 2021-05-26

## 2021-05-26 RX ORDER — ALBUTEROL SULFATE 0.83 MG/ML
2.5 SOLUTION RESPIRATORY (INHALATION)
Status: CANCELLED | OUTPATIENT
Start: 2021-05-26

## 2021-05-26 RX ORDER — SODIUM CHLORIDE 9 MG/ML
1000 INJECTION, SOLUTION INTRAVENOUS CONTINUOUS PRN
Status: CANCELLED
Start: 2021-06-02

## 2021-05-26 RX ORDER — METHYLPREDNISOLONE SODIUM SUCCINATE 125 MG/2ML
125 INJECTION, POWDER, LYOPHILIZED, FOR SOLUTION INTRAMUSCULAR; INTRAVENOUS
Status: CANCELLED
Start: 2021-05-26

## 2021-05-26 RX ORDER — EPINEPHRINE 1 MG/ML
0.3 INJECTION, SOLUTION INTRAMUSCULAR; SUBCUTANEOUS EVERY 5 MIN PRN
Status: CANCELLED | OUTPATIENT
Start: 2021-06-02

## 2021-05-26 RX ORDER — HEPARIN SODIUM (PORCINE) LOCK FLUSH IV SOLN 100 UNIT/ML 100 UNIT/ML
5 SOLUTION INTRAVENOUS
Status: DISCONTINUED | OUTPATIENT
Start: 2021-05-26 | End: 2021-05-26 | Stop reason: HOSPADM

## 2021-05-26 RX ORDER — METHYLPREDNISOLONE SODIUM SUCCINATE 125 MG/2ML
125 INJECTION, POWDER, LYOPHILIZED, FOR SOLUTION INTRAMUSCULAR; INTRAVENOUS
Status: CANCELLED
Start: 2021-06-02

## 2021-05-26 RX ORDER — MEPERIDINE HYDROCHLORIDE 25 MG/ML
25 INJECTION INTRAMUSCULAR; INTRAVENOUS; SUBCUTANEOUS EVERY 30 MIN PRN
Status: CANCELLED | OUTPATIENT
Start: 2021-05-26

## 2021-05-26 RX ORDER — EPINEPHRINE 1 MG/ML
0.3 INJECTION, SOLUTION INTRAMUSCULAR; SUBCUTANEOUS EVERY 5 MIN PRN
Status: CANCELLED | OUTPATIENT
Start: 2021-05-26

## 2021-05-26 RX ADMIN — SODIUM CHLORIDE 500 ML: 9 INJECTION, SOLUTION INTRAVENOUS at 09:29

## 2021-05-26 RX ADMIN — Medication 5 ML: at 12:27

## 2021-05-26 RX ADMIN — FOSAPREPITANT DIMEGLUMINE 150 MG: 150 INJECTION, POWDER, LYOPHILIZED, FOR SOLUTION INTRAVENOUS at 09:50

## 2021-05-26 RX ADMIN — DEXAMETHASONE SODIUM PHOSPHATE: 10 INJECTION, SOLUTION INTRAMUSCULAR; INTRAVENOUS at 09:32

## 2021-05-26 RX ADMIN — ALTEPLASE 2 MG: 2.2 INJECTION, POWDER, LYOPHILIZED, FOR SOLUTION INTRAVENOUS at 08:18

## 2021-05-26 RX ADMIN — SACITUZUMAB GOVITECAN 444 MG: 180 POWDER, FOR SOLUTION INTRAVENOUS at 10:48

## 2021-05-26 RX ADMIN — DIPHENHYDRAMINE HYDROCHLORIDE 50 MG: 25 CAPSULE ORAL at 09:22

## 2021-05-26 RX ADMIN — FAMOTIDINE 20 MG: 20 INJECTION, SOLUTION INTRAVENOUS at 10:26

## 2021-05-26 RX ADMIN — ACETAMINOPHEN 650 MG: 325 TABLET, FILM COATED ORAL at 09:22

## 2021-05-26 ASSESSMENT — PAIN SCALES - GENERAL: PAINLEVEL: NO PAIN (0)

## 2021-05-26 NOTE — LETTER
5/26/2021         RE: Shaneka Alvarez  [id number] [facility] [address]        Dear Colleague,    Thank you for referring your patient, Shaneka Alvarez, to the [facility]. Please see a copy of my visit note below.    Oncology Follow Up Visit: May 26, 2021     Oncologist: Dr Dewayne Zepeda  PCP: Mohit Barcenas    Diagnosis: Metastatic Breast Cancer to brain and spine  Shaneka Alvarez is a 59 yo female who was diagnosed with Stage IIB, T2 N1 M0 invasive lobular carcinoma of the right breast  In January 2006.  Final pathology showed a 4.5 x 3.8 x 2.5 cm, 01/14 lymph nodes positive.  Estrogen, progesterone receptor positive, HER-2 negative.     Genetics- BRCA1 and 2 mutations not detected. 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  There was no evidence of new multiple sclerosis lesions.  PET scan on 8/30/2019 which showed widespread bone metastatic lesions (calvarium, spine, sacrum, pelvis, ribs most prominent at C7, T3, L1 and L4), hypermetabolic 3 cm lesion in LLL, and mediastinal/hilar LN. CEA wnl at 1.9 and C27-29 elevated to 415 (28 on 8/23/16). A CT guided biopsy of the right iliac bone was obtained on 9/4/19, pathology consistent with metastatic adenocarcinoma (breast), ER/SD negative, HER-2 negative PDL 1 < 1%. A second biopsy was take of the LLL nodule via EBUS on 9/5/19 did not show any malignancy.  C/T/L spine MRI 8/3/19 to 9/2/19 with numerous enhancing lesions of the C, T and L spine, largest around T9 and L1. No evidence of cord compression. Has a L1 compression fracture and impending L4. completed radiation therapy to T12-L5.-Neurosurgery recommended no surgical intervention but to wear [place] brace when out of bed and HOB >30 degrees  Treatment:   2006:  ECOG 2104 protocol of dose-dense Adriamycin, Cytoxan  and Avastin x4 cycles followed by Taxol and Avastin x4 cycles.   03/2007:  Completed 1 year Avastin.   11/2006-01/2007:  Radiotherapy to the right breast of 5040 cGy.   [id number]:  Aromasin.  Stopped after moving to the [facility].   01/2016:  Right modified radical mastectomy with latissimus dorsi flap reconstruction and a left prophylactic mastectomy with latissimus dorsi flap reconstruction.   9/6/2019 till 9/18/2019 Received radiation T12-L5 3000 cGy in 10 fractions   -Neurosurgery recommended no surgical intervention but to wear [place] brace when out of bed and HOB >30 degrees  9/18/2019 completed T12-L5 radiation in 10 fractions =3000 cGy  10/1/2019 Xeloda x 2 trials lowering dose to 1000 mg bid  10/23/2019 Began every 3 month Zometa  11/5/2019- 1/14/2020  weekly paclitaxel x 3 cycles  1/28-10/2020 Eribulin  11/11/2020 Trodelvy  Repeat biopsy done from the right acetabulum on 2/25/2021 which showed metastatic lobular breast cancer.  Hormonal studies, HER-2 FISH again showed that it is triple negative.  Androgen receptor is diffusely positive.    PD-L1 negative.  Foundation 1 testing did not reveal any actionable mutations    Interval History: Ms. Alvarez is contacted today prior to cycle 10 of Trodelvy. Pt is reporting she is tolerating the medication well with side effects of some constipation with use of senna S. She is otherwise doing well with no new issues today. Back pain being covered by MS Contin with good support. She is exercising regularly and states she has just gotten a new bike. She is maintaining weight and no new complaints. Has had covid 19 vaccinations. .   Rest of comprehensive and complete ROS is reviewed and is negative.   Past Medical History:   Diagnosis Date     Breast cancer (H)     Age 44     Cataract     left eye     Chemotherapy induced nausea and vomiting 12/15/2020     Common migraine      Esophageal reflux      H/O bilateral mastectomy      Mild major depression (H)       Multiple sclerosis (H)      Pulmonary embolism (H)      Current Outpatient Medications   Medication     acetaminophen (TYLENOL) 500 MG tablet     apixaban ANTICOAGULANT (ELIQUIS) 5 MG tablet     busPIRone (BUSPAR) 15 MG tablet     calcium citrate-vitamin D (CITRACAL) 315-250 MG-UNIT TABS     Cholecalciferol (VITAMIN D3 PO)     HEMP OIL OR EXTRACT OR OTHER CBD CANNABINOID, NOT MEDICAL CANNABIS,     LORazepam (ATIVAN) 1 MG tablet     magnesium 250 MG tablet     morphine (MS CONTIN) 15 MG CR tablet     morphine (MSIR) 15 MG IR tablet     OLANZapine (ZYPREXA) 5 MG tablet     promethazine (PHENERGAN) 25 MG tablet     propranolol ER (INDERAL LA) 80 MG 24 hr capsule     senna-docusate (SENOKOT-S/PERICOLACE) 8.6-50 MG tablet     traZODone (DESYREL) 50 MG tablet     venlafaxine (EFFEXOR-XR) 75 MG 24 hr capsule     No current facility-administered medications for this visit.      Allergies   Allergen Reactions     Bactrim [Sulfamethoxazole W/Trimethoprim]      Internal bleeding     Copaxone [Glatiramer] Hives       Physical Exam:BP (!) 125/91   Pulse 108   Temp 98.6  F (37  C) (Oral)   Resp 14   Wt 62.6 kg (138 lb)   SpO2 100%   BMI 25.24 kg/m     Constitutional: Alert, healthy, and in no distress.   ENT: Eyes bright, No mouth sores  Neck: Supple, No adenopathy.Thyroid symmetric  Cardiac: Heart rate and rhythm is regular and strong without murmur  Respiratory: Breathing easy. Lung sounds clear to auscultation  Abdomen: Soft, non-tender, BS normal. No masses or organomegaly  MS: Muscle tone normal, extremities normal with no edema. Walking  Skin: No suspicious lesions or rashes  Neuro: Sensory grossly WNL, gait normal.   Lymph: Normal ant/post cervical, axillary, supraclavicular nodes  Psych: Mentation appears normal and affect normal/bright and smiling    Laboratory Results:   No results found for any visits on 05/26/21.- will be completed with infusion visit.     Assessment and Plan:   Metastatic Breast Cancer to brain  "and spine-Pt is due to continue with Trudelvy cycle 10. Pt is tolerating well with review, labs and exam. She will continue with plan as set.   We will have her return in 3 weeks for review prior to continuation of next cycle. .  Back pain/bone metastasis-  Pt previously completed radiation therapy to T12-L5 1 year ago. Continues with palliative care involvement and oversite with use of MS Contin 15mg nightly with ibuprofen and MSIR available as needed . She is able to get good rest and stay active by day with this plan.   Bone mets-. She continues using Zometa every 3 months which is due with next cycle.  She confirmed use of calcium plus vitamin D for support.  Given praise for effort for her effort to get into biking.   Bilateral pulmonary embolism-  Found 7/2020 with imaging. Continues on Eliquis-no new symptoms of concern.   Constipation- using Senna S for support and reminded of fluids and exercise to help with this problem.   Anxiety/Depression-continues using BuSpar for help with the anxiety and has trazodone  MS - currently not on treatment for her MS - no new or recurrent symptoms- has not seen neurology for > 1 year.   The total time of this encounter amounted to 30 minutes. This time included face to face time spent with the patient, prep work, ordering tests, and performing post visit documentation.  Annie Bailon Cnp                  Oncology Rooming Note    May 26, 2021 8:22 AM   Shaneka Alvarez is a 60 year old female who presents for:    Chief Complaint   Patient presents with     Video Visit     Bone metastases (H)     Initial Vitals: Wt 59 kg (130 lb)   BMI 23.78 kg/m   Estimated body mass index is 23.78 kg/m  as calculated from the following:    Height as of 5/5/21: 1.575 m (5' 2\").    Weight as of this encounter: 59 kg (130 lb). Body surface area is 1.61 meters squared.  No Pain (0) Comment: Data Unavailable   No LMP recorded. Patient is postmenopausal.  Allergies reviewed: Yes  Medications " reviewed: Yes    Medications: Medication refills not needed today.  Pharmacy name entered into [facility]:    [facility][id number] - [place], [place] - [address] [facility][id number] - [place], [place] - [address] [facility] [address]  [facility] - [place], [place] - [address] [facility] [place], [place] - [address] [facility]  [facility] - [place], [place] - [address] [facility]    Clinical concerns: Runny nose, what can the pt do for that? SG was notified.      Shari J. Schoenberger, CMA                Again, thank you for allowing me to participate in the care of your patient.        Sincerely,        Annie Bailon NP, APRN CNP

## 2021-05-26 NOTE — PROGRESS NOTES
Nursing Note:  Shaneka Alvarez presents today for port labs for tx today.    Patient seen by provider today: Yes: Alyson   present during visit today: Not Applicable.    Note: N/A.    Intravenous Access:  Lab draw site left hand, Needle type BF, Gauge 23.  Implanted Port.  NO blood return noted to port.  TPA instilled.    Discharge Plan:   Patient was sent to clinic for MD appointment.    Juventino Guzman RN

## 2021-05-26 NOTE — PROGRESS NOTES
"Oncology Rooming Note    May 26, 2021 8:22 AM   Shaneka Alvarez is a 60 year old female who presents for:    Chief Complaint   Patient presents with     Video Visit     Bone metastases (H)     Initial Vitals: Wt 59 kg (130 lb)   BMI 23.78 kg/m   Estimated body mass index is 23.78 kg/m  as calculated from the following:    Height as of 5/5/21: 1.575 m (5' 2\").    Weight as of this encounter: 59 kg (130 lb). Body surface area is 1.61 meters squared.  No Pain (0) Comment: Data Unavailable   No LMP recorded. Patient is postmenopausal.  Allergies reviewed: Yes  Medications reviewed: Yes    Medications: Medication refills not needed today.  Pharmacy name entered into [facility]:    [facility] #[id number] - [place], [place] - [address] [facility] #[id number] - [place], [place] - [address] AT [facility] OF [facility] [address]  [place] - [place], [place] - [address] [place] [address] INPATIENT RX - [place], [place] - [address] [place] [address]  [facility] - [place], [place] - [address] [facility]    Clinical concerns: Runny nose, what can the pt do for that? SG was notified.      Shari J. Schoenberger, [facility]            "

## 2021-05-26 NOTE — PROGRESS NOTES
Infusion Nursing Note:  Shaneka Alvarez presents today for C10D1 Trodelvy.    Patient seen by provider today: Yes: MOAN Bailon NP   present during visit today: Not Applicable.    Note: Cathflo placed in port earlier this morning in lab.  Patient's port able to draw blood without issue after Cathflo in place for 50 minutes.  Patient tolerated today's infusion without issue.    Intravenous Access:  Implanted Port.    Treatment Conditions:  Lab Results   Component Value Date    HGB 13.3 05/26/2021     Lab Results   Component Value Date    WBC 4.4 05/26/2021      Lab Results   Component Value Date    ANEU 3.3 05/26/2021     Lab Results   Component Value Date     05/26/2021      Lab Results   Component Value Date     05/26/2021                   Lab Results   Component Value Date    POTASSIUM 3.5 05/26/2021           Lab Results   Component Value Date    MAG 2.0 05/26/2021            Lab Results   Component Value Date    CR 0.61 05/26/2021                   Lab Results   Component Value Date    RAFAELA 9.2 05/26/2021                Lab Results   Component Value Date    BILITOTAL 0.5 05/26/2021           Lab Results   Component Value Date    ALBUMIN 3.8 05/26/2021                    Lab Results   Component Value Date    ALT 24 05/26/2021           Lab Results   Component Value Date    AST 21 05/26/2021       Results reviewed, labs MET treatment parameters, ok to proceed with treatment.      Post Infusion Assessment:  Patient tolerated infusion without incident.  Patient observed for 30 minutes post Trodelvy per protocol.  Blood return noted pre and post infusion.  Site patent and intact, free from redness, edema or discomfort.  No evidence of extravasations.  Access discontinued per protocol.       Discharge Plan:   Patient declined prescription refills.  Discharge instructions reviewed with: Patient.  Patient and/or family verbalized understanding of discharge instructions and all questions  answered.  AVS to patient via [employer].  Patient will return 6/2/21 for [address] for next appointment.   Patient discharged in stable condition accompanied by: self.  Departure Mode: Ambulatory.      Elizabeth Bocanegra RN

## 2021-06-02 ENCOUNTER — INFUSION THERAPY VISIT (OUTPATIENT)
Dept: INFUSION THERAPY | Facility: CLINIC | Age: 61
End: 2021-06-02
Payer: COMMERCIAL

## 2021-06-02 VITALS
OXYGEN SATURATION: 98 % | BODY MASS INDEX: 26.52 KG/M2 | RESPIRATION RATE: 14 BRPM | HEART RATE: 86 BPM | SYSTOLIC BLOOD PRESSURE: 132 MMHG | DIASTOLIC BLOOD PRESSURE: 86 MMHG | TEMPERATURE: 98.3 F | WEIGHT: 145 LBS

## 2021-06-02 DIAGNOSIS — E87.6 HYPOKALEMIA: Primary | ICD-10-CM

## 2021-06-02 DIAGNOSIS — C79.51 BONE METASTASES: Primary | ICD-10-CM

## 2021-06-02 DIAGNOSIS — C50.912 BILATERAL MALIGNANT NEOPLASM OF BREAST IN FEMALE, UNSPECIFIED ESTROGEN RECEPTOR STATUS, UNSPECIFIED SITE OF BREAST (H): ICD-10-CM

## 2021-06-02 DIAGNOSIS — C50.919 METASTATIC BREAST CANCER: ICD-10-CM

## 2021-06-02 DIAGNOSIS — C50.911 BILATERAL MALIGNANT NEOPLASM OF BREAST IN FEMALE, UNSPECIFIED ESTROGEN RECEPTOR STATUS, UNSPECIFIED SITE OF BREAST (H): ICD-10-CM

## 2021-06-02 DIAGNOSIS — T45.1X5A CHEMOTHERAPY-INDUCED NEUTROPENIA (H): ICD-10-CM

## 2021-06-02 DIAGNOSIS — D70.1 CHEMOTHERAPY-INDUCED NEUTROPENIA (H): ICD-10-CM

## 2021-06-02 DIAGNOSIS — E87.6 HYPOKALEMIA: ICD-10-CM

## 2021-06-02 LAB
ALBUMIN SERPL-MCNC: 3.2 G/DL (ref 3.4–5)
ALP SERPL-CCNC: 75 U/L (ref 40–150)
ALT SERPL W P-5'-P-CCNC: 29 U/L (ref 0–50)
ANION GAP SERPL CALCULATED.3IONS-SCNC: 2 MMOL/L (ref 3–14)
AST SERPL W P-5'-P-CCNC: 23 U/L (ref 0–45)
BASOPHILS # BLD AUTO: 0 10E9/L (ref 0–0.2)
BASOPHILS NFR BLD AUTO: 1 %
BILIRUB SERPL-MCNC: 0.2 MG/DL (ref 0.2–1.3)
BUN SERPL-MCNC: 13 MG/DL (ref 7–30)
CALCIUM SERPL-MCNC: 8.7 MG/DL (ref 8.5–10.1)
CHLORIDE SERPL-SCNC: 102 MMOL/L (ref 94–109)
CO2 SERPL-SCNC: 33 MMOL/L (ref 20–32)
CREAT SERPL-MCNC: 0.72 MG/DL (ref 0.52–1.04)
DIFFERENTIAL METHOD BLD: ABNORMAL
EOSINOPHIL # BLD AUTO: 0.1 10E9/L (ref 0–0.7)
EOSINOPHIL NFR BLD AUTO: 4 %
ERYTHROCYTE [DISTWIDTH] IN BLOOD BY AUTOMATED COUNT: 14.4 % (ref 10–15)
GFR SERPL CREATININE-BSD FRML MDRD: >90 ML/MIN/{1.73_M2}
GLUCOSE SERPL-MCNC: 101 MG/DL (ref 70–99)
HCT VFR BLD AUTO: 34.8 % (ref 35–47)
HGB BLD-MCNC: 11.4 G/DL (ref 11.7–15.7)
LYMPHOCYTES # BLD AUTO: 0.6 10E9/L (ref 0.8–5.3)
LYMPHOCYTES NFR BLD AUTO: 18 %
MAGNESIUM SERPL-MCNC: 1.9 MG/DL (ref 1.6–2.3)
MCH RBC QN AUTO: 28.8 PG (ref 26.5–33)
MCHC RBC AUTO-ENTMCNC: 32.8 G/DL (ref 31.5–36.5)
MCV RBC AUTO: 88 FL (ref 78–100)
MONOCYTES # BLD AUTO: 0.5 10E9/L (ref 0–1.3)
MONOCYTES NFR BLD AUTO: 14 %
NEUTROPHILS # BLD AUTO: 2.1 10E9/L (ref 1.6–8.3)
NEUTROPHILS NFR BLD AUTO: 63 %
PHOSPHATE SERPL-MCNC: 3.9 MG/DL (ref 2.5–4.5)
PLATELET # BLD AUTO: 197 10E9/L (ref 150–450)
POTASSIUM SERPL-SCNC: 3.8 MMOL/L (ref 3.4–5.3)
PROT SERPL-MCNC: 6.2 G/DL (ref 6.8–8.8)
RBC # BLD AUTO: 3.96 10E12/L (ref 3.8–5.2)
SODIUM SERPL-SCNC: 137 MMOL/L (ref 133–144)
WBC # BLD AUTO: 3.3 10E9/L (ref 4–11)

## 2021-06-02 PROCEDURE — 96375 TX/PRO/DX INJ NEW DRUG ADDON: CPT | Performed by: NURSE PRACTITIONER

## 2021-06-02 PROCEDURE — 83735 ASSAY OF MAGNESIUM: CPT | Performed by: NURSE PRACTITIONER

## 2021-06-02 PROCEDURE — 84100 ASSAY OF PHOSPHORUS: CPT | Performed by: NURSE PRACTITIONER

## 2021-06-02 PROCEDURE — 99207 PR NO CHARGE LOS: CPT

## 2021-06-02 PROCEDURE — 96413 CHEMO IV INFUSION 1 HR: CPT | Performed by: NURSE PRACTITIONER

## 2021-06-02 PROCEDURE — 80053 COMPREHEN METABOLIC PANEL: CPT | Performed by: NURSE PRACTITIONER

## 2021-06-02 PROCEDURE — 85025 COMPLETE CBC W/AUTO DIFF WBC: CPT | Performed by: NURSE PRACTITIONER

## 2021-06-02 PROCEDURE — S0028 INJECTION, FAMOTIDINE, 20 MG: HCPCS | Performed by: NURSE PRACTITIONER

## 2021-06-02 PROCEDURE — 96367 TX/PROPH/DG ADDL SEQ IV INF: CPT | Performed by: NURSE PRACTITIONER

## 2021-06-02 PROCEDURE — 99207 PR NO CHARGE NURSE ONLY: CPT

## 2021-06-02 RX ORDER — ACETAMINOPHEN 325 MG/1
650 TABLET ORAL ONCE
Status: COMPLETED | OUTPATIENT
Start: 2021-06-02 | End: 2021-06-02

## 2021-06-02 RX ORDER — HEPARIN SODIUM (PORCINE) LOCK FLUSH IV SOLN 100 UNIT/ML 100 UNIT/ML
5 SOLUTION INTRAVENOUS
Status: CANCELLED | OUTPATIENT
Start: 2021-06-02

## 2021-06-02 RX ORDER — HEPARIN SODIUM (PORCINE) LOCK FLUSH IV SOLN 100 UNIT/ML 100 UNIT/ML
5 SOLUTION INTRAVENOUS
Status: DISCONTINUED | OUTPATIENT
Start: 2021-06-02 | End: 2021-06-02 | Stop reason: HOSPADM

## 2021-06-02 RX ORDER — DIPHENHYDRAMINE HCL 25 MG
50 CAPSULE ORAL ONCE
Status: COMPLETED | OUTPATIENT
Start: 2021-06-02 | End: 2021-06-02

## 2021-06-02 RX ORDER — HEPARIN SODIUM,PORCINE 10 UNIT/ML
5 VIAL (ML) INTRAVENOUS
Status: CANCELLED | OUTPATIENT
Start: 2021-06-02

## 2021-06-02 RX ORDER — LORATADINE 10 MG/1
10 TABLET ORAL DAILY
COMMUNITY
End: 2022-11-18

## 2021-06-02 RX ADMIN — ACETAMINOPHEN 650 MG: 325 TABLET ORAL at 09:23

## 2021-06-02 RX ADMIN — Medication 500 ML: at 09:30

## 2021-06-02 RX ADMIN — HEPARIN SODIUM (PORCINE) LOCK FLUSH IV SOLN 100 UNIT/ML 5 ML: 100 SOLUTION at 08:47

## 2021-06-02 RX ADMIN — HEPARIN SODIUM (PORCINE) LOCK FLUSH IV SOLN 100 UNIT/ML 5 ML: 100 SOLUTION at 12:01

## 2021-06-02 RX ADMIN — Medication 50 MG: at 09:23

## 2021-06-02 NOTE — PROGRESS NOTES
"Patient's name and  were verified.  See Doc Flowsheet - IV assess for details.  IVAD accessed with 20G 3/4\" mcnair gripper plus needle  blood return positive: YES  Site without redness, tenderness or swelling: YES  flushed with 20cc NS and 5cc 100u/ml heparin  Needle: left in place for treatment  Comments: Labs drawn per treatment orders.  Patient tolerated procedure without incident.      "

## 2021-06-09 ENCOUNTER — MYC REFILL (OUTPATIENT)
Dept: PALLIATIVE CARE | Facility: CLINIC | Age: 61
End: 2021-06-09

## 2021-06-09 DIAGNOSIS — C50.919 METASTATIC BREAST CANCER: ICD-10-CM

## 2021-06-09 DIAGNOSIS — G89.3 CANCER ASSOCIATED PAIN: ICD-10-CM

## 2021-06-09 NOTE — TELEPHONE ENCOUNTER
Received MyChart message from pharmacy requesting refill of Morphine IR and Morphine ER.     Last refill: 5/20/21  Last office visit: 3/29/21  Scheduled for follow up 6/28/21     Will route request to MD for review.     Reviewed [place]  Report.

## 2021-06-10 RX ORDER — MORPHINE SULFATE 15 MG/1
15-30 TABLET ORAL
Qty: 60 TABLET | Refills: 0 | Status: SHIPPED | OUTPATIENT
Start: 2021-06-10 | End: 2021-07-28

## 2021-06-10 RX ORDER — MORPHINE SULFATE 15 MG/1
15 TABLET, FILM COATED, EXTENDED RELEASE ORAL EVERY EVENING
Qty: 30 TABLET | Refills: 0 | Status: SHIPPED | OUTPATIENT
Start: 2021-06-17 | End: 2021-07-28

## 2021-06-11 ENCOUNTER — HOSPITAL ENCOUNTER (OUTPATIENT)
Dept: PET IMAGING | Facility: CLINIC | Age: 61
Discharge: HOME | End: 2021-06-11
Attending: INTERNAL MEDICINE | Admitting: INTERNAL MEDICINE
Payer: COMMERCIAL

## 2021-06-11 DIAGNOSIS — C79.51 BONE METASTASES: ICD-10-CM

## 2021-06-11 DIAGNOSIS — C50.919 METASTATIC BREAST CANCER: ICD-10-CM

## 2021-06-11 PROCEDURE — 78816 PET IMAGE W/CT FULL BODY: CPT

## 2021-06-11 PROCEDURE — A9552 F18 FDG: HCPCS | Performed by: INTERNAL MEDICINE

## 2021-06-11 PROCEDURE — 78816 PET IMAGE W/CT FULL BODY: CPT | Mod: 26

## 2021-06-11 PROCEDURE — 71260 CT THORAX DX C+: CPT

## 2021-06-11 PROCEDURE — 74177 CT ABD & PELVIS W/CONTRAST: CPT | Mod: 26

## 2021-06-11 PROCEDURE — 250N000011 HC RX IP 250 OP 636: Performed by: INTERNAL MEDICINE

## 2021-06-11 PROCEDURE — 71260 CT THORAX DX C+: CPT | Mod: 26

## 2021-06-11 PROCEDURE — 343N000001 HC RX 343: Performed by: INTERNAL MEDICINE

## 2021-06-11 RX ORDER — IOPAMIDOL 755 MG/ML
10-135 INJECTION, SOLUTION INTRAVASCULAR ONCE
Status: COMPLETED | OUTPATIENT
Start: 2021-06-11 | End: 2021-06-11

## 2021-06-11 RX ADMIN — IOPAMIDOL 89 ML: 755 INJECTION, SOLUTION INTRAVENOUS at 08:33

## 2021-06-11 RX ADMIN — FLUDEOXYGLUCOSE F-18 12.5 MCI.: 500 INJECTION, SOLUTION INTRAVENOUS at 08:33

## 2021-06-16 ENCOUNTER — INFUSION THERAPY VISIT (OUTPATIENT)
Dept: INFUSION THERAPY | Facility: CLINIC | Age: 61
End: 2021-06-16
Payer: COMMERCIAL

## 2021-06-16 ENCOUNTER — VIRTUAL VISIT (OUTPATIENT)
Dept: ONCOLOGY | Facility: CLINIC | Age: 61
End: 2021-06-16
Attending: INTERNAL MEDICINE
Payer: COMMERCIAL

## 2021-06-16 VITALS
TEMPERATURE: 98.2 F | OXYGEN SATURATION: 99 % | RESPIRATION RATE: 16 BRPM | HEART RATE: 58 BPM | BODY MASS INDEX: 26 KG/M2 | WEIGHT: 141.3 LBS | DIASTOLIC BLOOD PRESSURE: 86 MMHG | SYSTOLIC BLOOD PRESSURE: 137 MMHG | HEIGHT: 62 IN

## 2021-06-16 DIAGNOSIS — C50.912 BILATERAL MALIGNANT NEOPLASM OF BREAST IN FEMALE, UNSPECIFIED ESTROGEN RECEPTOR STATUS, UNSPECIFIED SITE OF BREAST (H): ICD-10-CM

## 2021-06-16 DIAGNOSIS — C79.51 BONE METASTASES: Primary | ICD-10-CM

## 2021-06-16 DIAGNOSIS — C50.919 METASTATIC BREAST CANCER: ICD-10-CM

## 2021-06-16 DIAGNOSIS — T45.1X5A CHEMOTHERAPY-INDUCED NEUTROPENIA (H): ICD-10-CM

## 2021-06-16 DIAGNOSIS — D70.1 CHEMOTHERAPY-INDUCED NEUTROPENIA (H): ICD-10-CM

## 2021-06-16 DIAGNOSIS — E87.6 HYPOKALEMIA: ICD-10-CM

## 2021-06-16 DIAGNOSIS — C50.911 BILATERAL MALIGNANT NEOPLASM OF BREAST IN FEMALE, UNSPECIFIED ESTROGEN RECEPTOR STATUS, UNSPECIFIED SITE OF BREAST (H): ICD-10-CM

## 2021-06-16 DIAGNOSIS — T45.1X5A CHEMOTHERAPY-INDUCED NEUTROPENIA (H): Primary | ICD-10-CM

## 2021-06-16 DIAGNOSIS — C79.51 BONE METASTASES: ICD-10-CM

## 2021-06-16 DIAGNOSIS — D70.1 CHEMOTHERAPY-INDUCED NEUTROPENIA (H): Primary | ICD-10-CM

## 2021-06-16 LAB
ALBUMIN SERPL-MCNC: 3.1 G/DL (ref 3.4–5)
ALP SERPL-CCNC: 65 U/L (ref 40–150)
ALT SERPL W P-5'-P-CCNC: 25 U/L (ref 0–50)
ANION GAP SERPL CALCULATED.3IONS-SCNC: 3 MMOL/L (ref 3–14)
AST SERPL W P-5'-P-CCNC: 19 U/L (ref 0–45)
BASOPHILS # BLD AUTO: 0 10E9/L (ref 0–0.2)
BASOPHILS NFR BLD AUTO: 1 %
BILIRUB SERPL-MCNC: 0.2 MG/DL (ref 0.2–1.3)
BUN SERPL-MCNC: 11 MG/DL (ref 7–30)
CALCIUM SERPL-MCNC: 8.7 MG/DL (ref 8.5–10.1)
CANCER AG27-29 SERPL-ACNC: 465 U/ML (ref 0–39)
CHLORIDE SERPL-SCNC: 101 MMOL/L (ref 94–109)
CO2 SERPL-SCNC: 30 MMOL/L (ref 20–32)
CREAT SERPL-MCNC: 0.6 MG/DL (ref 0.52–1.04)
DIFFERENTIAL METHOD BLD: ABNORMAL
EOSINOPHIL # BLD AUTO: 0.2 10E9/L (ref 0–0.7)
EOSINOPHIL NFR BLD AUTO: 3.9 %
ERYTHROCYTE [DISTWIDTH] IN BLOOD BY AUTOMATED COUNT: 14.6 % (ref 10–15)
GFR SERPL CREATININE-BSD FRML MDRD: >90 ML/MIN/{1.73_M2}
GLUCOSE SERPL-MCNC: 106 MG/DL (ref 70–99)
HCT VFR BLD AUTO: 35 % (ref 35–47)
HGB BLD-MCNC: 11.5 G/DL (ref 11.7–15.7)
IMM GRANULOCYTES # BLD: 0.1 10E9/L (ref 0–0.4)
IMM GRANULOCYTES NFR BLD: 1.2 %
LYMPHOCYTES # BLD AUTO: 0.5 10E9/L (ref 0.8–5.3)
LYMPHOCYTES NFR BLD AUTO: 13.1 %
MAGNESIUM SERPL-MCNC: 2.1 MG/DL (ref 1.6–2.3)
MCH RBC QN AUTO: 29.1 PG (ref 26.5–33)
MCHC RBC AUTO-ENTMCNC: 32.9 G/DL (ref 31.5–36.5)
MCV RBC AUTO: 89 FL (ref 78–100)
MONOCYTES # BLD AUTO: 0.6 10E9/L (ref 0–1.3)
MONOCYTES NFR BLD AUTO: 15.6 %
NEUTROPHILS # BLD AUTO: 2.7 10E9/L (ref 1.6–8.3)
NEUTROPHILS NFR BLD AUTO: 65.2 %
PHOSPHATE SERPL-MCNC: 4 MG/DL (ref 2.5–4.5)
PLATELET # BLD AUTO: 216 10E9/L (ref 150–450)
POTASSIUM SERPL-SCNC: 3.3 MMOL/L (ref 3.4–5.3)
PROT SERPL-MCNC: 6.1 G/DL (ref 6.8–8.8)
RBC # BLD AUTO: 3.95 10E12/L (ref 3.8–5.2)
SODIUM SERPL-SCNC: 134 MMOL/L (ref 133–144)
WBC # BLD AUTO: 4.1 10E9/L (ref 4–11)

## 2021-06-16 PROCEDURE — 96372 THER/PROPH/DIAG INJ SC/IM: CPT | Mod: 59 | Performed by: NURSE PRACTITIONER

## 2021-06-16 PROCEDURE — 96413 CHEMO IV INFUSION 1 HR: CPT | Performed by: NURSE PRACTITIONER

## 2021-06-16 PROCEDURE — 99207 PR NO CHARGE NURSE ONLY: CPT

## 2021-06-16 PROCEDURE — 80053 COMPREHEN METABOLIC PANEL: CPT | Performed by: NURSE PRACTITIONER

## 2021-06-16 PROCEDURE — 99207 PR NO CHARGE LOS: CPT

## 2021-06-16 PROCEDURE — S0028 INJECTION, FAMOTIDINE, 20 MG: HCPCS | Performed by: NURSE PRACTITIONER

## 2021-06-16 PROCEDURE — 99215 OFFICE O/P EST HI 40 MIN: CPT | Mod: GT | Performed by: INTERNAL MEDICINE

## 2021-06-16 PROCEDURE — 96375 TX/PRO/DX INJ NEW DRUG ADDON: CPT | Performed by: NURSE PRACTITIONER

## 2021-06-16 PROCEDURE — 96367 TX/PROPH/DG ADDL SEQ IV INF: CPT | Performed by: NURSE PRACTITIONER

## 2021-06-16 PROCEDURE — 85025 COMPLETE CBC W/AUTO DIFF WBC: CPT | Performed by: NURSE PRACTITIONER

## 2021-06-16 PROCEDURE — 83735 ASSAY OF MAGNESIUM: CPT | Performed by: NURSE PRACTITIONER

## 2021-06-16 PROCEDURE — 84100 ASSAY OF PHOSPHORUS: CPT | Performed by: NURSE PRACTITIONER

## 2021-06-16 PROCEDURE — 86300 IMMUNOASSAY TUMOR CA 15-3: CPT | Performed by: NURSE PRACTITIONER

## 2021-06-16 RX ORDER — DIPHENHYDRAMINE HCL 25 MG
50 CAPSULE ORAL ONCE
Status: CANCELLED | OUTPATIENT
Start: 2021-06-23

## 2021-06-16 RX ORDER — POTASSIUM CHLORIDE 750 MG/1
40 TABLET, EXTENDED RELEASE ORAL ONCE
Status: COMPLETED | OUTPATIENT
Start: 2021-06-16 | End: 2021-06-16

## 2021-06-16 RX ORDER — HEPARIN SODIUM,PORCINE 10 UNIT/ML
5 VIAL (ML) INTRAVENOUS
Status: CANCELLED | OUTPATIENT
Start: 2021-06-16

## 2021-06-16 RX ORDER — EPINEPHRINE 1 MG/ML
0.3 INJECTION, SOLUTION INTRAMUSCULAR; SUBCUTANEOUS EVERY 5 MIN PRN
Status: CANCELLED | OUTPATIENT
Start: 2021-06-16

## 2021-06-16 RX ORDER — SODIUM CHLORIDE 9 MG/ML
1000 INJECTION, SOLUTION INTRAVENOUS CONTINUOUS PRN
Status: CANCELLED
Start: 2021-06-23

## 2021-06-16 RX ORDER — METHYLPREDNISOLONE SODIUM SUCCINATE 125 MG/2ML
125 INJECTION, POWDER, LYOPHILIZED, FOR SOLUTION INTRAMUSCULAR; INTRAVENOUS
Status: CANCELLED
Start: 2021-06-23

## 2021-06-16 RX ORDER — ALBUTEROL SULFATE 0.83 MG/ML
2.5 SOLUTION RESPIRATORY (INHALATION)
Status: CANCELLED | OUTPATIENT
Start: 2021-06-23

## 2021-06-16 RX ORDER — LORAZEPAM 2 MG/ML
0.5 INJECTION INTRAMUSCULAR EVERY 4 HOURS PRN
Status: CANCELLED
Start: 2021-06-16

## 2021-06-16 RX ORDER — HEPARIN SODIUM (PORCINE) LOCK FLUSH IV SOLN 100 UNIT/ML 100 UNIT/ML
5 SOLUTION INTRAVENOUS
Status: CANCELLED | OUTPATIENT
Start: 2021-06-23

## 2021-06-16 RX ORDER — EPINEPHRINE 1 MG/ML
0.3 INJECTION, SOLUTION INTRAMUSCULAR; SUBCUTANEOUS EVERY 5 MIN PRN
Status: CANCELLED | OUTPATIENT
Start: 2021-06-23

## 2021-06-16 RX ORDER — METHYLPREDNISOLONE SODIUM SUCCINATE 125 MG/2ML
125 INJECTION, POWDER, LYOPHILIZED, FOR SOLUTION INTRAMUSCULAR; INTRAVENOUS
Status: CANCELLED
Start: 2021-06-16

## 2021-06-16 RX ORDER — ATROPINE SULFATE 0.4 MG/ML
0.4 AMPUL (ML) INJECTION
Status: CANCELLED | OUTPATIENT
Start: 2021-06-23

## 2021-06-16 RX ORDER — HEPARIN SODIUM (PORCINE) LOCK FLUSH IV SOLN 100 UNIT/ML 100 UNIT/ML
5 SOLUTION INTRAVENOUS
Status: DISCONTINUED | OUTPATIENT
Start: 2021-06-16 | End: 2021-06-16 | Stop reason: HOSPADM

## 2021-06-16 RX ORDER — ACETAMINOPHEN 325 MG/1
650 TABLET ORAL ONCE
Status: COMPLETED | OUTPATIENT
Start: 2021-06-16 | End: 2021-06-16

## 2021-06-16 RX ORDER — MEPERIDINE HYDROCHLORIDE 25 MG/ML
25 INJECTION INTRAMUSCULAR; INTRAVENOUS; SUBCUTANEOUS EVERY 30 MIN PRN
Status: CANCELLED | OUTPATIENT
Start: 2021-06-23

## 2021-06-16 RX ORDER — LORAZEPAM 2 MG/ML
0.5 INJECTION INTRAMUSCULAR EVERY 4 HOURS PRN
Status: CANCELLED
Start: 2021-06-23

## 2021-06-16 RX ORDER — DIPHENHYDRAMINE HCL 25 MG
50 CAPSULE ORAL ONCE
Status: COMPLETED | OUTPATIENT
Start: 2021-06-16 | End: 2021-06-16

## 2021-06-16 RX ORDER — NALOXONE HYDROCHLORIDE 0.4 MG/ML
.1-.4 INJECTION, SOLUTION INTRAMUSCULAR; INTRAVENOUS; SUBCUTANEOUS
Status: CANCELLED | OUTPATIENT
Start: 2021-06-23

## 2021-06-16 RX ORDER — SODIUM CHLORIDE 9 MG/ML
1000 INJECTION, SOLUTION INTRAVENOUS CONTINUOUS PRN
Status: CANCELLED
Start: 2021-06-16

## 2021-06-16 RX ORDER — MEPERIDINE HYDROCHLORIDE 25 MG/ML
25 INJECTION INTRAMUSCULAR; INTRAVENOUS; SUBCUTANEOUS EVERY 30 MIN PRN
Status: CANCELLED | OUTPATIENT
Start: 2021-06-16

## 2021-06-16 RX ORDER — ATROPINE SULFATE 0.4 MG/ML
0.4 AMPUL (ML) INJECTION
Status: CANCELLED | OUTPATIENT
Start: 2021-06-16

## 2021-06-16 RX ORDER — ALBUTEROL SULFATE 0.83 MG/ML
2.5 SOLUTION RESPIRATORY (INHALATION)
Status: CANCELLED | OUTPATIENT
Start: 2021-06-16

## 2021-06-16 RX ORDER — DIPHENHYDRAMINE HYDROCHLORIDE 50 MG/ML
50 INJECTION INTRAMUSCULAR; INTRAVENOUS
Status: CANCELLED
Start: 2021-06-16

## 2021-06-16 RX ORDER — ACETAMINOPHEN 325 MG/1
650 TABLET ORAL ONCE
Status: CANCELLED | OUTPATIENT
Start: 2021-06-23

## 2021-06-16 RX ORDER — NALOXONE HYDROCHLORIDE 0.4 MG/ML
.1-.4 INJECTION, SOLUTION INTRAMUSCULAR; INTRAVENOUS; SUBCUTANEOUS
Status: CANCELLED | OUTPATIENT
Start: 2021-06-16

## 2021-06-16 RX ORDER — ACETAMINOPHEN 325 MG/1
650 TABLET ORAL ONCE
Status: CANCELLED | OUTPATIENT
Start: 2021-06-16

## 2021-06-16 RX ORDER — ALBUTEROL SULFATE 90 UG/1
1-2 AEROSOL, METERED RESPIRATORY (INHALATION)
Status: CANCELLED
Start: 2021-06-16

## 2021-06-16 RX ORDER — ALBUTEROL SULFATE 90 UG/1
1-2 AEROSOL, METERED RESPIRATORY (INHALATION)
Status: CANCELLED
Start: 2021-06-23

## 2021-06-16 RX ORDER — HEPARIN SODIUM (PORCINE) LOCK FLUSH IV SOLN 100 UNIT/ML 100 UNIT/ML
5 SOLUTION INTRAVENOUS
Status: CANCELLED | OUTPATIENT
Start: 2021-06-16

## 2021-06-16 RX ORDER — DIPHENHYDRAMINE HCL 25 MG
50 CAPSULE ORAL ONCE
Status: CANCELLED | OUTPATIENT
Start: 2021-06-16

## 2021-06-16 RX ORDER — HEPARIN SODIUM,PORCINE 10 UNIT/ML
5 VIAL (ML) INTRAVENOUS
Status: CANCELLED | OUTPATIENT
Start: 2021-06-23

## 2021-06-16 RX ORDER — DIPHENHYDRAMINE HYDROCHLORIDE 50 MG/ML
50 INJECTION INTRAMUSCULAR; INTRAVENOUS
Status: CANCELLED
Start: 2021-06-23

## 2021-06-16 RX ADMIN — Medication 500 ML: at 10:21

## 2021-06-16 RX ADMIN — Medication 50 MG: at 10:17

## 2021-06-16 RX ADMIN — ACETAMINOPHEN 650 MG: 325 TABLET ORAL at 10:18

## 2021-06-16 RX ADMIN — HEPARIN SODIUM (PORCINE) LOCK FLUSH IV SOLN 100 UNIT/ML 5 ML: 100 SOLUTION at 13:06

## 2021-06-16 RX ADMIN — POTASSIUM CHLORIDE 40 MEQ: 750 TABLET, EXTENDED RELEASE ORAL at 10:26

## 2021-06-16 RX ADMIN — HEPARIN SODIUM (PORCINE) LOCK FLUSH IV SOLN 100 UNIT/ML 5 ML: 100 SOLUTION at 08:57

## 2021-06-16 ASSESSMENT — MIFFLIN-ST. JEOR: SCORE: 1169.18

## 2021-06-16 ASSESSMENT — PAIN SCALES - GENERAL: PAINLEVEL: NO PAIN (0)

## 2021-06-16 NOTE — PATIENT INSTRUCTIONS
Chemo and xgeva today    See me back 7/7/2021 @ 10 AM ( ok to book ) and chemo to follow    Please also make appointment for 7/28/2021  and 8/18/2021 to see a provider and chemo

## 2021-06-16 NOTE — PROGRESS NOTES
Infusion Nursing Note:  Shaneka Alvarez presents today for C11 D1 trodelvy.    Patient seen by provider today: Yes: Dr Zepeda   present during visit today: Not Applicable.    Note: Potassium 40 mEq given PO today    Intravenous Access:  Implanted Port.    Treatment Conditions:  Lab Results   Component Value Date    HGB 11.5 06/16/2021     Lab Results   Component Value Date    WBC 4.1 06/16/2021      Lab Results   Component Value Date    ANEU 2.7 06/16/2021     Lab Results   Component Value Date     06/16/2021      Lab Results   Component Value Date     06/16/2021                   Lab Results   Component Value Date    POTASSIUM 3.3 06/16/2021           Lab Results   Component Value Date    MAG 2.1 06/16/2021            Lab Results   Component Value Date    CR 0.60 06/16/2021                   Lab Results   Component Value Date    RAFAELA 8.7 06/16/2021                Lab Results   Component Value Date    BILITOTAL 0.2 06/16/2021           Lab Results   Component Value Date    ALBUMIN 3.1 06/16/2021                    Lab Results   Component Value Date    ALT 25 06/16/2021           Lab Results   Component Value Date    AST 19 06/16/2021       Results reviewed, labs MET treatment parameters, ok to proceed with treatment.  Phos 4.0  Magnesium 2.1  Potassium 3.3 - oral replacement given      Post Infusion Assessment:  Patient tolerated infusion without incident.  Patient observed for 30 minutes post trodelvy per protocol.  Site patent and intact, free from redness, edema or discomfort.  No evidence of extravasations.  Access discontinued per protocol.       Discharge Plan:   Patient discharged in stable condition accompanied by: self.  Departure Mode: Ambulatory.  Next trodelvy dose scheduled for 6/23/21.      Keira Brooks RN

## 2021-06-16 NOTE — PROGRESS NOTES
ONCOLOGY FOLLOW UP NOTE: 6/16/21        I took the history from reviewing the previous notes that she was following with Dr. Amaya.  I have copied and updated from prior notes.    ONCOLOGY History:    1.  January 2006:  Diagnosed with Stage IIB, T2 N1 M0 invasive lobular carcinoma of the right breast.  Final pathology showed a 4.5 x 3.8 x 2.5 cm, 01/14 lymph nodes positive.  Estrogen, progesterone receptor positive, HER-2 negative.     2.  Genetics.  BRCA1 and 2 mutations not detected. 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       THERAPY TO DATE:   1.  2006:  ECOG 2104 protocol of dose-dense Adriamycin, Cytoxan and Avastin x4 cycles followed by Taxol and Avastin x4 cycles.   2.  03/2007:  Completed 1 year Avastin.   3.  11/2006-01/2007:  Radiotherapy to the right breast of 5040 cGy.   4.  [id number]:  Aromasin.  Stopped after moving to the [facility].   5.  01/2016:  Right modified radical mastectomy with latissimus dorsi flap reconstruction and a left prophylactic mastectomy with latissimus dorsi flap reconstruction.     She recently had MRI of the brain as a follow-up for multiple sclerosis and there were multiple calvarial lesions noted which was suspicious for metastatic disease.  There was no evidence of new multiple sclerosis lesions.  She had a PET scan on 8/30/2019 which showed widespread bone metastatic lesions (calvarium, spine, sacrum, pelvis, ribs most prominent at C7, T3, L1 and L4), hypermetabolic 3 cm lesion in LLL, and mediastinal/hilar LN. CEA wnl at 1.9 and C27-29 elevated to 415 (28 on 8/23/16). A CT guided biopsy of the right iliac bone was obtained on 9/4/19, pathology consistent with metastatic adenocarcinoma (breast), ER/OH negative, HER-2 negative PDL 1 < 1%. A second biopsy was take of the LLL nodule via EBUS on 9/5/19 did not show any malignancy.    C/T/L spine MRI 8/3/19 to 9/2/19 with numerous enhancing lesions of the C, T and L spine, largest around T9 and L1. No evidence  of cord compression. Has a L1 compression fracture and impending L4.     Received radiation T12-L5 3000 cGy in 10 fractions from 9/6/2019 till 9/18/2019.  Neurosurgery recommended no surgical intervention but to wear Gorge brace when out of bed and HOB >30 degrees    She started palliative Xeloda 2000/1500 on 10/1/2019 but after just a few doses she noticed nausea, red eyes blurred vision, loss of appetite.  She stopped taking it after 5 doses and was seen by nurse practitioner on 10/7/2019 when she was improving.  At that point she was restarted on Xeloda at a lower dose of 1000 mg twice a day.  As she was not able to tolerate even the lower dose with extreme nausea and vomiting and feeling extremely fatigued and blurry vision, we decided on stopping Xeloda.    We decided on repeating scans and MRI brain on 10/25/2019 showed no intracranial metastatic disease.   Multiple enhancing calvarial lesions may be increased in number and are suggestive of metastatic disease. Thinner imaging on the current study may identify the smaller lesions and may be responsible for  identified more lesions.   There is a single focus of T2 hyperintense signal within the anterior right temporal lobe may represent interval demyelination. There is no evidence for active demyelination.    PET/CT on 11/1/2019 showed favorable response to therapy and Overall FDG uptake within scattered osseous metastases is decreased since 8/30/2019, particularly within the pelvis. Increased sclerotic appearance of L1 and L4 pathologic compression deformities, likely sequela of recently completed palliative radiation therapy.    No significant FDG uptake in previously demonstrated hypermetabolic mediastinal lymph nodes. Biopsy negative on 9/5/2019.  There is also decreased FDG uptake in the left lower lobe (biopsy negative for  malignancy), now with dense consolidation containing air bronchograms suggestive of infection versus aspiration.  There is diffuse  FDG uptake within the esophagus, consistent with esophagitis.    Because of intolerance to Xeloda we decided on switching to weekly paclitaxel on 11/5/2019.    C#2 Taxol 12/4/19- started with 70mg/m2 dose reduction    C#3 Taxol 12/30/2019     PET/CT on 1/24/2020 showed progression of the disease in some of the bone lesions including increase in size and lytic lesion within the vertebral body of C4 measuring 1 cm as opposed to 0.6 cm previously and a new central soft tissue nodule with SUV max 4.1 when previously it was non-hypermetabolic.  There is also some progression noted in the right proximal femur and right iliac bone.  There is decreased metabolic uptake in the right lamina of T9 with SUV max 4.7 when previously it was 8.0.  Other lesions are stable.    CA 27-29 also had increased significantly and it was 257 on 1/28/2020.    We decided on switching to eribulin on 1/28/2020.    C#2 2/18/2020  C#2 D#8 2/25/2020    C#3 D#1 3/18/2020 -delayed by 1 week as per patient's preference because she wanted to visit her family.    She had a repeat PET/CT on 3/27/2020 which showed stable size of the bone metastatic lesions although the FDG avidity was less, consistent with treatment response.    She had MRI of the thoracic spine on 4/3/2020 and it was compared to the one which was done in August 2019 and it showed increased size of several metastatic lesions most notably at T9 but also at T5-T7.  Other lesions at T10, T11 and T12 appeared improved.    CA 27-29 was also down to 222 on 3/18/2020.    Cycle #4 eribulin ( dose reduced to 1.2 mg/m2 )-4/7/2020-   Cycle #5 Eribulin ( 1.2 mg/m2 )- 4/28/2020   Cycle #6 Eribulin ( 1.2 mg/m2 )- 5/19/2020     Cycle #7 Eribulin ( 1.2 mg/m2 )- 6/9/2020    Cycle #8 Eribulin ( 1.2 mg/m2 )- 6/30/2020     She had a repeat PET scan on 7/17/2020 which showed incidental finding of new acute bilateral pulmonary embolism in the distal left main pulmonary artery extending in the left upper and  lower lobes and in the segmental and branch arteries in the right lower lobe.    It also showed mildly increased FDG avidity in the lytic lesion in the T9 lamina and right pedicle with SUV max 5.3, previously 3.9.  That is also slightly increased FDG uptake to the left anterior fifth rib at the costochondral junction SUV max 3.9, previously 2.5.  There is slightly decreased FDG uptake in the right femoral neck lesion SUV max 2.2, previously 2.9.  FDG uptake in other bone lesions is fairly stable.  No new metastasis are seen.    CA 27-29 was 308 on 6/30/2020.  It was 286 on 5/19/2020.    Overall this is consistent with only slight progression versus stable disease.    She was started on Lovenox.    Cycle #9 eribulin 7/21/2020. CA 27-29 was 238    C#10 eribulin 8/18/2020. ( delayed by one week for ANC 0.9 )    C#11 Eribulin 9/8/2020    C#12 Eribulin 9/29/2020     C#13 Eribulin 10/20/2020     PET scan on 11/6/2020 shows progression of the disease with increased FDG uptake in the lytic lesions in the T9/T10 and right posterolateral elements as well as increased FDG uptake in the previously known hypermetabolic right iliac bone lesion suggesting recurrence of previously treated metastasis.  There is new subtle lesion in the right manubrium.  There is also a new fracture in the anterolateral left fourth rib with associated uptake and this could be a pathologic fracture.  Healing fracture in the left anterior fifth rib lesion.  There is resolution of the left pulmonary emboli.  Decreased FDG uptake of the esophagus consistent with improving inflammation.    CA 27-29 on 10/20/2020 was also elevated at 489.    C#1 Trodelvy on 11/11/2020.  Cycle #2 Trodelvy 12/2/2020  Cycle number 2-day #8 Trodelvy 12/8/2020.    12/15/2020-12/16/2020.  She was admitted to the hospital with nausea vomiting and dehydration.  She had tachycardia and initial lactic acid of 5.  It decreased to 1.4 after 2 L of normal saline.  She also had  hypokalemia and ANC was 1.3.  She was treated with IV fluids.  Procalcitonin was negative.  CTA of the chest was negative for pulmonary embolism or pneumonia.  No bacterial infection was documented.  Her medication which she was initially unable to tolerate at home, were restarted and she was discharged home once started to feel better.      We delayed the start of cycle number 3 x 1 week and decrease the dose of chemotherapy by 25%.  She also had neutropenia with ANC of 1.0.      She started cycle number 3-day #1 on 12/29/2020.  CA 27-29 was 392.    Cycle number 3-day #8 1/5/2021.    C#4 Trodelvy 1/20/2021- 75% dose    2/5/2021.  PET/CT overall shows a good response to treatment with improvement of previously hypermetabolic lesions in the spine and pelvis and stability of other bone meta stasis.  There is a single lytic lesion in the right iliac bone which demonstrates slight Yimi increased FDG uptake and has SUV max 4.7 while previously it was SUV max 3.4.  There was diffuse wall thickening of the esophagus with uptake in the esophagus in the fundus of the stomach and this could be seen with inflammation.    Trodelvy cycle #5 - 2/10/2021.  75% of the dose.  CA 27-29 was 337.    Trodelvy cycle #6 - 3/3/2021.  75% of the dose.  Trodelvy cycle #6, D#8 - 3/10/2021.  75% of the dose.    Trodelvy C#8, D#8 on 4/21/2021  CA 27-29 stable at 371 on 4/14/2021    Trodelvy, cycle #9- 5/5/2021        On 2/25/2020 when she had biopsy of the right acetabulum and it was consistent with metastatic lobular carcinoma.  Again it was Triple Negative.     On 3/18/2020 when she also met with Dr. Arelis Arshad who also advised testing for androgen receptor studies on the biopsy specimen.  Tumor was diffusely androgen receptor positive.      5/26/2021-cycle #10 Trodelvy started    CA 27-29 was 545.    6/11/2021.  PET/CT shows mildly increased uptake in several bone lesions for example in the left anterior eye iliac crest, mid right iliac crest  and left ischium.  Uptake in other bone lesions are similar to previously.    Because of minimal progression of the disease and she is tolerating chemotherapy very well, we decided on continuing the same chemotherapy.    6/16/2021-Trodelvy cycle #11    Interval history.  This is a video visit.  She tolerated last chemotherapy nicely.  Denies any significant nausea or vomiting or diarrhea or constipation.  She mentions that pain is under fair control and she takes MS Contin and morphine sulfate only at night.  Continues to take calcium and vitamin D.  She has not noticed any new swellings.  Swelling at the back of the scalp is a stable.  She denies infections shortness of breath or any worsening neuropathy.  Overall she feels really well and is stable.    She does not believe that she has lost weight.    ECOG 0-1    ROS:  Otherwise a comprehensive review of the system was negative.        I reviewed other history in epic as below.       PAST MEDICAL HISTORY:     1.  Breast cancer  2.  Multiple sclerosis.   3.  Depression.   4.  Migraines.   5.  Hypertension.   6.  Cholecystectomy and umbilical hernia repair.     SOCIAL HISTORY: She smoked for 5 years but quit many years ago.  Drinks alcohol socially.  She lives with her .  She is a teacher in middle school.       FAMILY HISTORY: She mentions that her mother had breast cancer at 49.  Both grandmothers had breast cancer but she is not sure of the age.  A couple of cousins have cancers.  Patient has 3 children who are healthy.    Current Outpatient Medications   Medication     acetaminophen (TYLENOL) 500 MG tablet     apixaban ANTICOAGULANT (ELIQUIS) 5 MG tablet     busPIRone (BUSPAR) 15 MG tablet     calcium citrate-vitamin D (CITRACAL) 315-250 MG-UNIT TABS     Cholecalciferol (VITAMIN D3 PO)     HEMP OIL OR EXTRACT OR OTHER CBD CANNABINOID, NOT MEDICAL CANNABIS,     loratadine (CLARITIN) 10 MG tablet     LORazepam (ATIVAN) 1 MG tablet     magnesium 250 MG  tablet     [START ON 6/17/2021] morphine (MS CONTIN) 15 MG CR tablet     morphine (MSIR) 15 MG IR tablet     OLANZapine (ZYPREXA) 5 MG tablet     promethazine (PHENERGAN) 25 MG tablet     propranolol ER (INDERAL LA) 80 MG 24 hr capsule     senna-docusate (SENOKOT-S/PERICOLACE) 8.6-50 MG tablet     traZODone (DESYREL) 50 MG tablet     venlafaxine (EFFEXOR-XR) 75 MG 24 hr capsule     No current facility-administered medications for this visit.      Facility-Administered Medications Ordered in Other Visits   Medication     heparin 100 UNIT/ML injection 5 mL     sodium chloride (PF) 0.9% PF flush 3-20 mL        Allergies   Allergen Reactions     Bactrim [Sulfamethoxazole W/Trimethoprim]      Internal bleeding     Copaxone [Glatiramer] Hives          PHYSICAL EXAMINATION:     There were no vitals taken for this visit.  Wt Readings from Last 4 Encounters:   06/02/21 65.8 kg (145 lb)   05/26/21 62.6 kg (138 lb)   05/12/21 64 kg (141 lb 1.6 oz)   05/05/21 61.5 kg (135 lb 8 oz)             Constitutional.  Does not seem to be in any acute distress.  Eyes.  No redness or discharge noted.  Respiratory.  Speaking in full sentences.  Breathing seems comfortable without any accessory use of muscles.    Skin.  Visualized his skin does not show any obvious rashes.  Musculoskeletal.  Range of motion for visualized areas is intact.  Neurological.  Alert and oriented x3.  Psychiatric.  Mood, mentation and affect are normal.  Decision making capacity is intact.      The rest of a comprehensive physical examination is deferred due to Public Health Emergency video visit restrictions.        Labs and Imaging.    Reviewed.    6/16/2021  CBC-WBC 4.1 with a normal ANC.  Hemoglobin 11.5.  Platelets 216.      CMP shows potassium 3.3.  Albumin 3.1.  Rest is unremarkable.    CA 27-29 was 545.    6/11/2021.  PET/CT shows mildly increased uptake in several bone lesions for example in the left anterior iliac crest, mid right iliac crest and left  ischium.  Uptake in other bone lesions are similar to previously.  The right eye iliac crest lesion which on PET CT in February 2021 showed mildly increased uptake as compared to previously, now seems stable.      Biopsy from the right acetabulum shows metastatic lobular carcinoma.  It is triple negative.  Androgen receptor was diffusely positive (90%, moderate intensity) by immunohistochemistry. )  PD-L1 negative.      Foundation 1 testing did not reveal any actionable mutations.          ASSESSMENT AND PLAN:     Now she has metastatic breast cancer negative breast cancer (androgen receptor positive ) with extensive involvement of the bones.  There is also a left lower lobe hypermetabolic lesion and mediastinal lymph nodes which are hypermetabolic.  The biopsy from the right iliac bone is consistent with metastatic lobular breast cancer but it is hormone receptor and HER-2 negative and PDL 1 is also negative.    Foundation 1 testing did not reveal any actionable mutations.    She was intolerant to Xeloda and progressed on Taxol and eribulin.      We then switched to recently FDA approved sacituzumab govitecan-hziy (Trodelvy) for TNBC, based on the results of the phase II IMMU-132-01 clinical trial.   She started this on 11/11/2020.      We delayed the start of cycle number 3 x 1 week and decrease the dose of chemotherapy by 25% and she also has neutropenia with ANC of 1.  She started cycle number 3-day #1 on 12/29/2020.      After 4 cycles of chemotherapy, overall the PET scan shows positive response to treatment apart from mildly increased FDG avidity in one of the right iliac bone lesions.  CA 27-29 was 454 slightly elevated from before.    We decided on continuing the same chemotherapy.      She obtained a second opinion from Dr. Castillo from [facility] who recommended a repeat biopsy to be done to make sure that she really has triple negative breast cancer.      She also met with Dr. Arelis Arshad on  3/18/2021.      She has completed 10 cycles of Trodelvy.  Last CA 27-29 was elevated from previously.      PET/CT on 6/11/2021 shows mildly increased uptake in some of the bone lesions while a stabilityin other bone lesions.      We discussed the situation in detail.  We have a couple of options going forward.  One option is that because of mild progression of the disease, we change treatments to something else.  In that scenario I would recommend changing to Doxil every 4 weeks.  We discussed the rational schedule and potential side effects of it in detail.  The other option would be to continue with the same chemotherapy with the rationale that the progression is mild and very slow and she is tolerating the treatments nicely and her quality of life is pretty good.  In the big picture we are achieving our goals of slowing down the cancer and maintaining her good quality of life.    She agrees with the second approach and she wants to continue with Trodelvy and I believe it is very reasonable.  We will continue to monitor CA 27-29 regularly and if there is continuously steady increase, then I will repeat scans in a short interval.    As the tumor is diffusely positive for androgen receptor, we will consider giving her androgen receptor blockers like Casodex or enzalutamide in the future.      Nausea/ vomiting.  Doing well with Emend Decadron and Zofran.  She has Compazine as needed.      Bone disease.  She has metastatic disease to the bone.  Previously she got palliative radiation to T12-L5 3000 cGy in 10 fractions from 9/6/2019 till 9/18/2019.  She was evaluated by orthopedics Dr. Bipin Elliott on 11/1/2019 and conservative management was recommended.    She was on Zometa every 3 months. She last received on 9/29/2020.  Because of progression of the disease in the bones, we switched to Xgeva which she received on 11/11/2020.  We are noticing mild progression in some of the bone lesions.  Continue Xgeva for  now.  Continue vitamin D and calcium.      Pain management.  Pain is under fair control with morphine sulfate immediate release and MS Contin.  Continue to follow with palliative care.    Anemia.  She has mild anemia from chemotherapy.  Continue to observe.    Bilateral pulmonary emboli.  Continue Eliquis for incidentally found PE on 7/17/2020.       Neuropathy.  She has mild neuropathy in the hands.  It is a stable and has not worsened.  Continue to monitor.   She does have chronic balance issues and heat and cold sensitivity from underlying multiple sclerosis and this has been a stable.      We did not address the following today.    Insomnia.  Continue trazodone.      Wall thickening of esophagus and FDG uptake of fundus of the stomach.  It could be in the setting of inflammation from recent nausea and vomiting.  Symptoms have resolved.  Continue to monitor clinically.      Subcutaneous nodule in the scalp.  I am not certain whether it is a cyst or a metastatic deposit.  PET scan does not cause this to light up.  Continue to observe.      Vision changes.    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  It was thought that this could be due to Taxol as patient reported to the ophthalmologist that she was on Taxol in September 2019 when her symptoms started.  But she was not on Taxol in September 2019 when she started noticing the visual changes so Taxol is not responsible for this.  The first dose of Taxol was on 11/5/2019.   She had left cataract extraction on 2/8/2021 and she has noticed significant improvement in her vision.  She plans to do cataract extraction of the right eye in couple of weeks.          Increased FDG ability in the colon.  She had FDG uptake in the cecum and ascending colon but no corresponding lesion was seen on the CT scan.  She is completely asymptomatic so at this time we will continue to observe.    Discussion regarding healthcare directives.  On  previous visit she told me that she will bring the health care directive for our records but she forgot so I told her to bring it on next visit.      Breast implant removal.  She tells me that she was planning to do breast implant removal because there was a recall on this.  Her surgery was scheduled for 9/24/2019.  Because of this new development of metastatic breast cancer and her recent radiation, surgery has been canceled.  We discussed that at this time treatment for metastatic breast cancer would take precedence over the other surgery as the other surgery would require her to be off chemotherapy for several weeks and in that case the chances of cancer progression would be high and I would not recommend that.    Multiple sclerosis.  She will continue to follow with her neurologist Dr. Quiles.  Currently multiple sclerosis is under control and currently she is not taking any medications.      Return to clinic in 3 weeks       All of her questions were answered to her satisfaction.  She is agreeable and comfortable with the plan.    Dewayne Zepeda MD    Video start time. 9:41 AM  Video stop time.9:59 AM

## 2021-06-16 NOTE — LETTER
6/16/2021         RE: Shaneka Alvarez  [id number] [facility] [id number]        Dear Colleague,    Thank you for referring your patient, Shaneka Alvarez, to the [facility]. Please see a copy of my visit note below.      ONCOLOGY FOLLOW UP NOTE: 6/16/21        I took the history from reviewing the previous notes that she was following with Dr. Amaya.  I have copied and updated from prior notes.    ONCOLOGY History:    1.  January 2006:  Diagnosed with Stage IIB, T2 N1 M0 invasive lobular carcinoma of the right breast.  Final pathology showed a 4.5 x 3.8 x 2.5 cm, 01/14 lymph nodes positive.  Estrogen, progesterone receptor positive, HER-2 negative.     2.  Genetics.  BRCA1 and 2 mutations not detected. 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       THERAPY TO DATE:   1. 2006:  ECOG 2104 protocol of dose-dense Adriamycin, Cytoxan and Avastin x4 cycles followed by Taxol and Avastin x4 cycles.   2.  03/2007:  Completed 1 year Avastin.   3.  11/2006-01/2007:  Radiotherapy to the right breast of 5040 cGy.   4.  [id number]:  Aromasin.  Stopped after moving to the [facility].   5.  01/2016:  Right modified radical mastectomy with latissimus dorsi flap reconstruction and a left prophylactic mastectomy with latissimus dorsi flap reconstruction.     She recently had MRI of the brain as a follow-up for multiple sclerosis and there were multiple calvarial lesions noted which was suspicious for metastatic disease.  There was no evidence of new multiple sclerosis lesions.  She had a PET scan on 8/30/2019 which showed widespread bone metastatic lesions (calvarium, spine, sacrum, pelvis, ribs most prominent at C7, T3, L1 and L4), hypermetabolic 3 cm lesion in LLL, and mediastinal/hilar LN. CEA wnl at 1.9 and C27-29 elevated to 415 (28 on 8/23/16). A CT guided biopsy of the right iliac bone was obtained on 9/4/19, pathology consistent with metastatic adenocarcinoma (breast),  ER/FL negative, HER-2 negative PDL 1 < 1%. A second biopsy was take of the LLL nodule via EBUS on 9/5/19 did not show any malignancy.    C/T/L spine MRI 8/3/19 to 9/2/19 with numerous enhancing lesions of the C, T and L spine, largest around T9 and L1. No evidence of cord compression. Has a L1 compression fracture and impending L4.     Received radiation T12-L5 3000 cGy in 10 fractions from 9/6/2019 till 9/18/2019.  Neurosurgery recommended no surgical intervention but to wear Mantee brace when out of bed and HOB >30 degrees    She started palliative Xeloda 2000/1500 on 10/1/2019 but after just a few doses she noticed nausea, red eyes blurred vision, loss of appetite.  She stopped taking it after 5 doses and was seen by nurse practitioner on 10/7/2019 when she was improving.  At that point she was restarted on Xeloda at a lower dose of 1000 mg twice a day.  As she was not able to tolerate even the lower dose with extreme nausea and vomiting and feeling extremely fatigued and blurry vision, we decided on stopping Xeloda.    We decided on repeating scans and MRI brain on 10/25/2019 showed no intracranial metastatic disease.   Multiple enhancing calvarial lesions may be increased in number and are suggestive of metastatic disease. Thinner imaging on the current study may identify the smaller lesions and may be responsible for  identified more lesions.   There is a single focus of T2 hyperintense signal within the anterior right temporal lobe may represent interval demyelination. There is no evidence for active demyelination.    PET/CT on 11/1/2019 showed favorable response to therapy and Overall FDG uptake within scattered osseous metastases is decreased since 8/30/2019, particularly within the pelvis. Increased sclerotic appearance of L1 and L4 pathologic compression deformities, likely sequela of recently completed palliative radiation therapy.    No significant FDG uptake in previously demonstrated hypermetabolic  mediastinal lymph nodes. Biopsy negative on 9/5/2019.  There is also decreased FDG uptake in the left lower lobe (biopsy negative for  malignancy), now with dense consolidation containing air bronchograms suggestive of infection versus aspiration.  There is diffuse FDG uptake within the esophagus, consistent with esophagitis.    Because of intolerance to Xeloda we decided on switching to weekly paclitaxel on 11/5/2019.    C#2 Taxol 12/4/19- started with 70mg/m2 dose reduction    C#3 Taxol 12/30/2019     PET/CT on 1/24/2020 showed progression of the disease in some of the bone lesions including increase in size and lytic lesion within the vertebral body of C4 measuring 1 cm as opposed to 0.6 cm previously and a new central soft tissue nodule with SUV max 4.1 when previously it was non-hypermetabolic.  There is also some progression noted in the right proximal femur and right iliac bone.  There is decreased metabolic uptake in the right lamina of T9 with SUV max 4.7 when previously it was 8.0.  Other lesions are stable.    CA 27-29 also had increased significantly and it was 257 on 1/28/2020.    We decided on switching to eribulin on 1/28/2020.    C#2 2/18/2020  C#2 D#8 2/25/2020    C#3 D#1 3/18/2020 -delayed by 1 week as per patient's preference because she wanted to visit her family.    She had a repeat PET/CT on 3/27/2020 which showed stable size of the bone metastatic lesions although the FDG avidity was less, consistent with treatment response.    She had MRI of the thoracic spine on 4/3/2020 and it was compared to the one which was done in August 2019 and it showed increased size of several metastatic lesions most notably at T9 but also at T5-T7.  Other lesions at T10, T11 and T12 appeared improved.    CA 27-29 was also down to 222 on 3/18/2020.    Cycle #4 eribulin ( dose reduced to 1.2 mg/m2 )-4/7/2020-   Cycle #5 Eribulin ( 1.2 mg/m2 )- 4/28/2020   Cycle #6 Eribulin ( 1.2 mg/m2 )- 5/19/2020     Cycle #7  Eribulin ( 1.2 mg/m2 )- 6/9/2020    Cycle #8 Eribulin ( 1.2 mg/m2 )- 6/30/2020     She had a repeat PET scan on 7/17/2020 which showed incidental finding of new acute bilateral pulmonary embolism in the distal left main pulmonary artery extending in the left upper and lower lobes and in the segmental and branch arteries in the right lower lobe.    It also showed mildly increased FDG avidity in the lytic lesion in the T9 lamina and right pedicle with SUV max 5.3, previously 3.9.  That is also slightly increased FDG uptake to the left anterior fifth rib at the costochondral junction SUV max 3.9, previously 2.5.  There is slightly decreased FDG uptake in the right femoral neck lesion SUV max 2.2, previously 2.9.  FDG uptake in other bone lesions is fairly stable.  No new metastasis are seen.    CA 27-29 was 308 on 6/30/2020.  It was 286 on 5/19/2020.    Overall this is consistent with only slight progression versus stable disease.    She was started on Lovenox.    Cycle #9 eribulin 7/21/2020. CA 27-29 was 238    C#10 eribulin 8/18/2020. ( delayed by one week for ANC 0.9 )    C#11 Eribulin 9/8/2020    C#12 Eribulin 9/29/2020     C#13 Eribulin 10/20/2020     PET scan on 11/6/2020 shows progression of the disease with increased FDG uptake in the lytic lesions in the T9/T10 and right posterolateral elements as well as increased FDG uptake in the previously known hypermetabolic right iliac bone lesion suggesting recurrence of previously treated metastasis.  There is new subtle lesion in the right manubrium.  There is also a new fracture in the anterolateral left fourth rib with associated uptake and this could be a pathologic fracture.  Healing fracture in the left anterior fifth rib lesion.  There is resolution of the left pulmonary emboli.  Decreased FDG uptake of the esophagus consistent with improving inflammation.    CA 27-29 on 10/20/2020 was also elevated at 489.    C#1 Trodelvy on 11/11/2020.  Cycle #2 Trodelvy  12/2/2020  Cycle number 2-day #8 Trodelvy 12/8/2020.    12/15/2020-12/16/2020.  She was admitted to the hospital with nausea vomiting and dehydration.  She had tachycardia and initial lactic acid of 5.  It decreased to 1.4 after 2 L of normal saline.  She also had hypokalemia and ANC was 1.3.  She was treated with IV fluids.  Procalcitonin was negative.  CTA of the chest was negative for pulmonary embolism or pneumonia.  No bacterial infection was documented.  Her medication which she was initially unable to tolerate at home, were restarted and she was discharged home once started to feel better.      We delayed the start of cycle number 3 x 1 week and decrease the dose of chemotherapy by 25%.  She also had neutropenia with ANC of 1.0.      She started cycle number 3-day #1 on 12/29/2020.  CA 27-29 was 392.    Cycle number 3-day #8 1/5/2021.    C#4 Trodelvy 1/20/2021- 75% dose    2/5/2021.  PET/CT overall shows a good response to treatment with improvement of previously hypermetabolic lesions in the spine and pelvis and stability of other bone meta stasis.  There is a single lytic lesion in the right iliac bone which demonstrates slight Yimi increased FDG uptake and has SUV max 4.7 while previously it was SUV max 3.4.  There was diffuse wall thickening of the esophagus with uptake in the esophagus in the fundus of the stomach and this could be seen with inflammation.    Trodelvy cycle #5 - 2/10/2021.  75% of the dose.  CA 27-29 was 337.    Trodelvy cycle #6 - 3/3/2021.  75% of the dose.  Trodelvy cycle #6, D#8 - 3/10/2021.  75% of the dose.    Trodelvy C#8, D#8 on 4/21/2021  CA 27-29 stable at 371 on 4/14/2021    Trodelvy, cycle #9- 5/5/2021        On 2/25/2020 when she had biopsy of the right acetabulum and it was consistent with metastatic lobular carcinoma.  Again it was Triple Negative.     On 3/18/2020 when she also met with Dr. Arelis Arshad who also advised testing for androgen receptor studies on the biopsy  specimen.  Tumor was diffusely androgen receptor positive.      5/26/2021-cycle #10 Trodelvy started    CA 27-29 was 545.    6/11/2021.  PET/CT shows mildly increased uptake in several bone lesions for example in the left anterior eye iliac crest, mid right iliac crest and left ischium.  Uptake in other bone lesions are similar to previously.    Because of minimal progression of the disease and she is tolerating chemotherapy very well, we decided on continuing the same chemotherapy.    6/16/2021-Trodelvy cycle #11    Interval history.  This is a video visit.  She tolerated last chemotherapy nicely.  Denies any significant nausea or vomiting or diarrhea or constipation.  She mentions that pain is under fair control and she takes MS Contin and morphine sulfate only at night.  Continues to take calcium and vitamin D.  She has not noticed any new swellings.  Swelling at the back of the scalp is a stable.  She denies infections shortness of breath or any worsening neuropathy.  Overall she feels really well and is stable.    She does not believe that she has lost weight.    ECOG 0-1    ROS:  Otherwise a comprehensive review of the system was negative.        I reviewed other history in epic as below.       PAST MEDICAL HISTORY:     1.  Breast cancer  2.  Multiple sclerosis.   3.  Depression.   4.  Migraines.   5.  Hypertension.   6.  Cholecystectomy and umbilical hernia repair.     SOCIAL HISTORY: She smoked for 5 years but quit many years ago.  Drinks alcohol socially.  She lives with her .  She is a teacher in middle school.       FAMILY HISTORY: She mentions that her mother had breast cancer at 49.  Both grandmothers had breast cancer but she is not sure of the age.  A couple of cousins have cancers.  Patient has 3 children who are healthy.    Current Outpatient Medications   Medication     acetaminophen (TYLENOL) 500 MG tablet     apixaban ANTICOAGULANT (ELIQUIS) 5 MG tablet     busPIRone (BUSPAR) 15 MG tablet      calcium citrate-vitamin D (CITRACAL) 315-250 MG-UNIT TABS     Cholecalciferol (VITAMIN D3 PO)     HEMP OIL OR EXTRACT OR OTHER CBD CANNABINOID, NOT MEDICAL CANNABIS,     loratadine (CLARITIN) 10 MG tablet     LORazepam (ATIVAN) 1 MG tablet     magnesium 250 MG tablet     [START ON 6/17/2021] morphine (MS CONTIN) 15 MG CR tablet     morphine (MSIR) 15 MG IR tablet     OLANZapine (ZYPREXA) 5 MG tablet     promethazine (PHENERGAN) 25 MG tablet     propranolol ER (INDERAL LA) 80 MG 24 hr capsule     senna-docusate (SENOKOT-S/PERICOLACE) 8.6-50 MG tablet     traZODone (DESYREL) 50 MG tablet     venlafaxine (EFFEXOR-XR) 75 MG 24 hr capsule     No current facility-administered medications for this visit.      Facility-Administered Medications Ordered in Other Visits   Medication     heparin 100 UNIT/ML injection 5 mL     sodium chloride (PF) 0.9% PF flush 3-20 mL        Allergies   Allergen Reactions     Bactrim [Sulfamethoxazole W/Trimethoprim]      Internal bleeding     Copaxone [Glatiramer] Hives          PHYSICAL EXAMINATION:     There were no vitals taken for this visit.  Wt Readings from Last 4 Encounters:   06/02/21 65.8 kg (145 lb)   05/26/21 62.6 kg (138 lb)   05/12/21 64 kg (141 lb 1.6 oz)   05/05/21 61.5 kg (135 lb 8 oz)             Constitutional.  Does not seem to be in any acute distress.  Eyes.  No redness or discharge noted.  Respiratory.  Speaking in full sentences.  Breathing seems comfortable without any accessory use of muscles.    Skin.  Visualized his skin does not show any obvious rashes.  Musculoskeletal.  Range of motion for visualized areas is intact.  Neurological.  Alert and oriented x3.  Psychiatric.  Mood, mentation and affect are normal.  Decision making capacity is intact.      The rest of a comprehensive physical examination is deferred due to Public Health Emergency video visit restrictions.        Labs and Imaging.    Reviewed.    6/16/2021  CBC-WBC 4.1 with a normal ANC.  Hemoglobin  11.5.  Platelets 216.      CMP shows potassium 3.3.  Albumin 3.1.  Rest is unremarkable.    CA 27-29 was 545.    6/11/2021.  PET/CT shows mildly increased uptake in several bone lesions for example in the left anterior iliac crest, mid right iliac crest and left ischium.  Uptake in other bone lesions are similar to previously.  The right eye iliac crest lesion which on PET CT in February 2021 showed mildly increased uptake as compared to previously, now seems stable.      Biopsy from the right acetabulum shows metastatic lobular carcinoma.  It is triple negative.  Androgen receptor was diffusely positive (90%, moderate intensity) by immunohistochemistry. )  PD-L1 negative.      Foundation 1 testing did not reveal any actionable mutations.          ASSESSMENT AND PLAN:     Now she has metastatic breast cancer negative breast cancer (androgen receptor positive ) with extensive involvement of the bones.  There is also a left lower lobe hypermetabolic lesion and mediastinal lymph nodes which are hypermetabolic.  The biopsy from the right iliac bone is consistent with metastatic lobular breast cancer but it is hormone receptor and HER-2 negative and PDL 1 is also negative.    Foundation 1 testing did not reveal any actionable mutations.    She was intolerant to Xeloda and progressed on Taxol and eribulin.      We then switched to recently FDA approved sacituzumab govitecan-hziy (Trodelvy) for TNBC, based on the results of the phase II IMMU-132-01 clinical trial.   She started this on 11/11/2020.      We delayed the start of cycle number 3 x 1 week and decrease the dose of chemotherapy by 25% and she also has neutropenia with ANC of 1.  She started cycle number 3-day #1 on 12/29/2020.      After 4 cycles of chemotherapy, overall the PET scan shows positive response to treatment apart from mildly increased FDG avidity in one of the right iliac bone lesions.  CA 27-29 was 454 slightly elevated from before.    We decided on  continuing the same chemotherapy.      She obtained a second opinion from Dr. Castillo from [facility] who recommended a repeat biopsy to be done to make sure that she really has triple negative breast cancer.      She also met with Dr. Arelis Arshad on 3/18/2021.      She has completed 10 cycles of Trodelvy.  Last CA 27-29 was elevated from previously.      PET/CT on 6/11/2021 shows mildly increased uptake in some of the bone lesions while a stabilityin other bone lesions.      We discussed the situation in detail.  We have a couple of options going forward.  One option is that because of mild progression of the disease, we change treatments to something else.  In that scenario I would recommend changing to Doxil every 4 weeks.  We discussed the rational schedule and potential side effects of it in detail.  The other option would be to continue with the same chemotherapy with the rationale that the progression is mild and very slow and she is tolerating the treatments nicely and her quality of life is pretty good.  In the big picture we are achieving our goals of slowing down the cancer and maintaining her good quality of life.    She agrees with the second approach and she wants to continue with Trodelvy and I believe it is very reasonable.  We will continue to monitor CA 27-29 regularly and if there is continuously steady increase, then I will repeat scans in a short interval.    As the tumor is diffusely positive for androgen receptor, we will consider giving her androgen receptor blockers like Casodex or enzalutamide in the future.      Nausea/ vomiting.  Doing well with Emend Decadron and Zofran.  She has Compazine as needed.      Bone disease.  She has metastatic disease to the bone.  Previously she got palliative radiation to T12-L5 3000 cGy in 10 fractions from 9/6/2019 till 9/18/2019.  She was evaluated by orthopedics Dr. Bipin Elliott on 11/1/2019 and conservative management was recommended.     She was on Zometa every 3 months. She last received on 9/29/2020.  Because of progression of the disease in the bones, we switched to Xgeva which she received on 11/11/2020.  We are noticing mild progression in some of the bone lesions.  Continue Xgeva for now.  Continue vitamin D and calcium.      Pain management.  Pain is under fair control with morphine sulfate immediate release and MS Contin.  Continue to follow with palliative care.    Anemia.  She has mild anemia from chemotherapy.  Continue to observe.    Bilateral pulmonary emboli.  Continue Eliquis for incidentally found PE on 7/17/2020.       Neuropathy.  She has mild neuropathy in the hands.  It is a stable and has not worsened.  Continue to monitor.   She does have chronic balance issues and heat and cold sensitivity from underlying multiple sclerosis and this has been a stable.      We did not address the following today.    Insomnia.  Continue trazodone.      Wall thickening of esophagus and FDG uptake of fundus of the stomach.  It could be in the setting of inflammation from recent nausea and vomiting.  Symptoms have resolved.  Continue to monitor clinically.      Subcutaneous nodule in the scalp.  I am not certain whether it is a cyst or a metastatic deposit.  PET scan does not cause this to light up.  Continue to observe.      Vision changes.    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  It was thought that this could be due to Taxol as patient reported to the ophthalmologist that she was on Taxol in September 2019 when her symptoms started.  But she was not on Taxol in September 2019 when she started noticing the visual changes so Taxol is not responsible for this.  The first dose of Taxol was on 11/5/2019.   She had left cataract extraction on 2/8/2021 and she has noticed significant improvement in her vision.  She plans to do cataract extraction of the right eye in couple of weeks.          Increased FDG  ability in the colon.  She had FDG uptake in the cecum and ascending colon but no corresponding lesion was seen on the CT scan.  She is completely asymptomatic so at this time we will continue to observe.    Discussion regarding healthcare directives.  On previous visit she told me that she will bring the health care directive for our records but she forgot so I told her to bring it on next visit.      Breast implant removal.  She tells me that she was planning to do breast implant removal because there was a recall on this.  Her surgery was scheduled for 9/24/2019.  Because of this new development of metastatic breast cancer and her recent radiation, surgery has been canceled.  We discussed that at this time treatment for metastatic breast cancer would take precedence over the other surgery as the other surgery would require her to be off chemotherapy for several weeks and in that case the chances of cancer progression would be high and I would not recommend that.    Multiple sclerosis.  She will continue to follow with her neurologist Dr. Quiles.  Currently multiple sclerosis is under control and currently she is not taking any medications.      Return to clinic in 3 weeks       All of her questions were answered to her satisfaction.  She is agreeable and comfortable with the plan.    Dewayne Zepeda MD    Video start time. 9:41 AM  Video stop time.9:59 AM          Again, thank you for allowing me to participate in the care of your patient.        Sincerely,        Dewayne Zepeda MD

## 2021-06-23 ENCOUNTER — INFUSION THERAPY VISIT (OUTPATIENT)
Dept: INFUSION THERAPY | Facility: CLINIC | Age: 61
End: 2021-06-23
Payer: COMMERCIAL

## 2021-06-23 VITALS
TEMPERATURE: 97.8 F | WEIGHT: 147.3 LBS | BODY MASS INDEX: 26.94 KG/M2 | SYSTOLIC BLOOD PRESSURE: 120 MMHG | DIASTOLIC BLOOD PRESSURE: 82 MMHG | OXYGEN SATURATION: 100 % | HEART RATE: 71 BPM

## 2021-06-23 DIAGNOSIS — D70.1 CHEMOTHERAPY-INDUCED NEUTROPENIA (H): ICD-10-CM

## 2021-06-23 DIAGNOSIS — T45.1X5A CHEMOTHERAPY-INDUCED NEUTROPENIA (H): ICD-10-CM

## 2021-06-23 DIAGNOSIS — C50.911 BILATERAL MALIGNANT NEOPLASM OF BREAST IN FEMALE, UNSPECIFIED ESTROGEN RECEPTOR STATUS, UNSPECIFIED SITE OF BREAST (H): ICD-10-CM

## 2021-06-23 DIAGNOSIS — C50.912 BILATERAL MALIGNANT NEOPLASM OF BREAST IN FEMALE, UNSPECIFIED ESTROGEN RECEPTOR STATUS, UNSPECIFIED SITE OF BREAST (H): ICD-10-CM

## 2021-06-23 DIAGNOSIS — C50.919 METASTATIC BREAST CANCER: ICD-10-CM

## 2021-06-23 DIAGNOSIS — E87.6 HYPOKALEMIA: Primary | ICD-10-CM

## 2021-06-23 LAB
ALBUMIN SERPL-MCNC: 2.9 G/DL (ref 3.4–5)
ALP SERPL-CCNC: 64 U/L (ref 40–150)
ALT SERPL W P-5'-P-CCNC: 26 U/L (ref 0–50)
ANION GAP SERPL CALCULATED.3IONS-SCNC: 3 MMOL/L (ref 3–14)
AST SERPL W P-5'-P-CCNC: 19 U/L (ref 0–45)
BASOPHILS # BLD AUTO: 0.1 10E9/L (ref 0–0.2)
BASOPHILS NFR BLD AUTO: 1.4 %
BILIRUB SERPL-MCNC: 0.2 MG/DL (ref 0.2–1.3)
BUN SERPL-MCNC: 8 MG/DL (ref 7–30)
CALCIUM SERPL-MCNC: 8.1 MG/DL (ref 8.5–10.1)
CHLORIDE SERPL-SCNC: 107 MMOL/L (ref 94–109)
CO2 SERPL-SCNC: 28 MMOL/L (ref 20–32)
CREAT SERPL-MCNC: 0.6 MG/DL (ref 0.52–1.04)
DIFFERENTIAL METHOD BLD: ABNORMAL
EOSINOPHIL # BLD AUTO: 0.2 10E9/L (ref 0–0.7)
EOSINOPHIL NFR BLD AUTO: 5.6 %
ERYTHROCYTE [DISTWIDTH] IN BLOOD BY AUTOMATED COUNT: 15 % (ref 10–15)
GFR SERPL CREATININE-BSD FRML MDRD: >90 ML/MIN/{1.73_M2}
GLUCOSE SERPL-MCNC: 97 MG/DL (ref 70–99)
HCT VFR BLD AUTO: 33.3 % (ref 35–47)
HGB BLD-MCNC: 10.9 G/DL (ref 11.7–15.7)
IMM GRANULOCYTES # BLD: 0.1 10E9/L (ref 0–0.4)
IMM GRANULOCYTES NFR BLD: 1.4 %
LYMPHOCYTES # BLD AUTO: 0.5 10E9/L (ref 0.8–5.3)
LYMPHOCYTES NFR BLD AUTO: 15.2 %
MAGNESIUM SERPL-MCNC: 2 MG/DL (ref 1.6–2.3)
MCH RBC QN AUTO: 29.7 PG (ref 26.5–33)
MCHC RBC AUTO-ENTMCNC: 32.7 G/DL (ref 31.5–36.5)
MCV RBC AUTO: 91 FL (ref 78–100)
MONOCYTES # BLD AUTO: 0.5 10E9/L (ref 0–1.3)
MONOCYTES NFR BLD AUTO: 12.6 %
NEUTROPHILS # BLD AUTO: 2.3 10E9/L (ref 1.6–8.3)
NEUTROPHILS NFR BLD AUTO: 63.8 %
PHOSPHATE SERPL-MCNC: 3.3 MG/DL (ref 2.5–4.5)
PLATELET # BLD AUTO: 181 10E9/L (ref 150–450)
POTASSIUM SERPL-SCNC: 4 MMOL/L (ref 3.4–5.3)
PROT SERPL-MCNC: 5.8 G/DL (ref 6.8–8.8)
RBC # BLD AUTO: 3.67 10E12/L (ref 3.8–5.2)
SODIUM SERPL-SCNC: 138 MMOL/L (ref 133–144)
WBC # BLD AUTO: 3.6 10E9/L (ref 4–11)

## 2021-06-23 PROCEDURE — S0028 INJECTION, FAMOTIDINE, 20 MG: HCPCS | Performed by: NURSE PRACTITIONER

## 2021-06-23 PROCEDURE — 84100 ASSAY OF PHOSPHORUS: CPT | Performed by: INTERNAL MEDICINE

## 2021-06-23 PROCEDURE — 80053 COMPREHEN METABOLIC PANEL: CPT | Performed by: INTERNAL MEDICINE

## 2021-06-23 PROCEDURE — 99207 PR NO CHARGE NURSE ONLY: CPT

## 2021-06-23 PROCEDURE — 99207 PR NO CHARGE LOS: CPT | Performed by: NURSE PRACTITIONER

## 2021-06-23 PROCEDURE — 85025 COMPLETE CBC W/AUTO DIFF WBC: CPT | Performed by: INTERNAL MEDICINE

## 2021-06-23 PROCEDURE — 96413 CHEMO IV INFUSION 1 HR: CPT | Performed by: NURSE PRACTITIONER

## 2021-06-23 PROCEDURE — 83735 ASSAY OF MAGNESIUM: CPT | Performed by: INTERNAL MEDICINE

## 2021-06-23 PROCEDURE — 96367 TX/PROPH/DG ADDL SEQ IV INF: CPT | Performed by: NURSE PRACTITIONER

## 2021-06-23 PROCEDURE — 96375 TX/PRO/DX INJ NEW DRUG ADDON: CPT | Performed by: NURSE PRACTITIONER

## 2021-06-23 RX ORDER — HEPARIN SODIUM (PORCINE) LOCK FLUSH IV SOLN 100 UNIT/ML 100 UNIT/ML
5 SOLUTION INTRAVENOUS ONCE
Status: COMPLETED | OUTPATIENT
Start: 2021-06-23 | End: 2021-06-23

## 2021-06-23 RX ORDER — HEPARIN SODIUM (PORCINE) LOCK FLUSH IV SOLN 100 UNIT/ML 100 UNIT/ML
5 SOLUTION INTRAVENOUS
Status: DISCONTINUED | OUTPATIENT
Start: 2021-06-23 | End: 2021-06-23 | Stop reason: HOSPADM

## 2021-06-23 RX ORDER — DIPHENHYDRAMINE HCL 25 MG
50 CAPSULE ORAL ONCE
Status: COMPLETED | OUTPATIENT
Start: 2021-06-23 | End: 2021-06-23

## 2021-06-23 RX ORDER — ACETAMINOPHEN 325 MG/1
650 TABLET ORAL ONCE
Status: COMPLETED | OUTPATIENT
Start: 2021-06-23 | End: 2021-06-23

## 2021-06-23 RX ADMIN — Medication 500 ML: at 09:21

## 2021-06-23 RX ADMIN — HEPARIN SODIUM (PORCINE) LOCK FLUSH IV SOLN 100 UNIT/ML 5 ML: 100 SOLUTION at 12:08

## 2021-06-23 RX ADMIN — ACETAMINOPHEN 650 MG: 325 TABLET ORAL at 09:50

## 2021-06-23 RX ADMIN — Medication 50 MG: at 09:51

## 2021-06-23 RX ADMIN — HEPARIN SODIUM (PORCINE) LOCK FLUSH IV SOLN 100 UNIT/ML 5 ML: 100 SOLUTION at 08:58

## 2021-06-23 ASSESSMENT — PAIN SCALES - GENERAL: PAINLEVEL: NO PAIN (0)

## 2021-06-23 NOTE — PROGRESS NOTES
Pt was Infusion Nursing Note:  Shaneka Alvarez presents today for C11D8 Trodelvy.    Patient seen by provider today: No   present during visit today: Not Applicable.    Note: Pt states she is tolerating chemotherapy very well, denies any complaints/concerns today.      Intravenous Access:  Implanted Port.    Treatment Conditions:  Lab Results   Component Value Date    HGB 10.9 06/23/2021     Lab Results   Component Value Date    WBC 3.6 06/23/2021      Lab Results   Component Value Date    ANEU 2.3 06/23/2021     Lab Results   Component Value Date     06/23/2021      Lab Results   Component Value Date     06/23/2021                   Lab Results   Component Value Date    POTASSIUM 4.0 06/23/2021           Lab Results   Component Value Date    MAG 2.0 06/23/2021            Lab Results   Component Value Date    CR 0.60 06/23/2021                   Lab Results   Component Value Date    RAFAELA 8.1 06/23/2021                Lab Results   Component Value Date    BILITOTAL 0.2 06/23/2021           Lab Results   Component Value Date    ALBUMIN 2.9 06/23/2021                    Lab Results   Component Value Date    ALT 26 06/23/2021           Lab Results   Component Value Date    AST 19 06/23/2021       Results reviewed, labs MET treatment parameters, ok to proceed with treatment.      Post Infusion Assessment:  Patient tolerated infusion without incident.  Patient observed for 30 minutes post Trodelvy per protocol.  Blood return noted pre and post infusion.  Site patent and intact, free from redness, edema or discomfort.  No evidence of extravasations.  Access discontinued per protocol.       Discharge Plan:   Patient discharged in stable condition accompanied by: self.  Departure Mode: Ambulatory.  Pt will RTC 7/7/21 for C12D1 Trodelvy.      Javier Roland RN

## 2021-06-23 NOTE — PROGRESS NOTES
Port was accessed. Labs drawn, heparin locked and left accessed for infusion.    Bobbi Contreras RN, OCN

## 2021-06-28 ENCOUNTER — VIRTUAL VISIT (OUTPATIENT)
Dept: ONCOLOGY | Facility: CLINIC | Age: 61
End: 2021-06-28
Payer: COMMERCIAL

## 2021-06-28 VITALS — WEIGHT: 147 LBS | BODY MASS INDEX: 27.05 KG/M2 | HEIGHT: 62 IN

## 2021-06-28 DIAGNOSIS — C50.911 BILATERAL MALIGNANT NEOPLASM OF BREAST IN FEMALE, UNSPECIFIED ESTROGEN RECEPTOR STATUS, UNSPECIFIED SITE OF BREAST (H): Primary | ICD-10-CM

## 2021-06-28 DIAGNOSIS — C50.912 BILATERAL MALIGNANT NEOPLASM OF BREAST IN FEMALE, UNSPECIFIED ESTROGEN RECEPTOR STATUS, UNSPECIFIED SITE OF BREAST (H): Primary | ICD-10-CM

## 2021-06-28 PROCEDURE — 99214 OFFICE O/P EST MOD 30 MIN: CPT | Mod: GT | Performed by: HOSPITALIST

## 2021-06-28 ASSESSMENT — MIFFLIN-ST. JEOR: SCORE: 1195.04

## 2021-06-28 NOTE — PROGRESS NOTES
Palliative Care Outpatient Clinic Progress Note    Patient Name: Shaneka Alvarez is being evaluated via a billable video visit.    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 Didn't tolerate Xeloda, paclitaxel Nov 2019-Jan 2020, treated with eribulin and zometa. PD seen on PET Nov 2020 as well as a new L 4th rib concerning for a pathologic fracture. Started Trodelvy 11/11/20              - admission Dec 2020 for presumed treatment side effects, continued with Trodelvy at reduced dose. Minimal progression on PET June 2021, decision to continue with Trodelvy  - b/l PE found on PET July 2020, on anticoagulation  - MS with mild right-sided weakness          Interim History:  At the last visit we discussed her pain, other than a new mild neck pain it had been well controlled with OME of 45. Further workup was negative for a new, significant bony met at the affected location.     Patient is on the call by herself.     The neck pain she developed around the time of our last visit has improved. Her pain is now mostly in her lower back again. Most days she takes 2 tablets MSIR, occasionally she takes 4 tablets. She also takes MS Contin 15mg at night.     We discuss her experience with morphine. She doesn't feel her mood or engagement with activities, experiences, and people around her has changed since she was started on opioids.    She is aware of the slight progression on her last imaging. In a conversation with Dr Zepeda they decided to continue with the  current treatment plan.     Coping:  She has been feeling well and is enjoying that.     Social History:  Pertinent changes to social history/social situation since last visit: no major changes.   She got an e-Trike for her birthday and enjoys being out and about with that    Key support resources:     Advance Directive Status:  POLST completed 20: DNR/selective treatments    Functional Status:  1 (Restricted in physically strenuous activity but ambulatory and able to carry out work of a light or sedentary nature)        Impression & Recommendations & Counseliny/o woman with metastatic breast cancer currently on Trodelvy.     Pain: currently adequate control with MS Contin 15mg HS plus 2, occasionally 4x 15mg MSIR daily. She remains active. No change in type or location of her pain.   Discussed buprenorphine. Considering her lack of side effects, stable dose, and good pain control, will hold off a rotation at this time.     Return in 2-3 months    Data / Chart Review:    Review of Systems:   ROS: 10 point ROS neg other than the symptoms noted above in the HPI and pertinents here.         Physical Exam:   Physical Exam:  Vital Signs not checked, virtual visit    CONSTIT: awake, appears comfortable  EENT: MMM, EOMI, no icterus  RESP: reg, nl effort, no cough  MSK: moves x4, SABINA  SKIN: no rash, no obvious lesions  NEURO: alert, oriented x3  PSYCH: appropriate affect, memory and thought process intact    The rest of a comprehensive physical examination is deferred  due to public health emergency video visit restrictions.        Current pertinent medications:  MS Contin 15mg HS  MSIR 15-30mg q3h prn              Naloxone prescribed  acetaminophen 1000mg tid  CBD cream     Dexamethasone 4mg q12h     Venlafaxine 225mg daily  Lorazepam 1mg prn  Trazodone 50mg HS  Buspirone 15mg bid     Olanzapine prn after chemo  Promethazine q6h prn  Senna bid prn, miralax bid prn    : ok, slightly early fill 6/10  (after 5/19)    Opioid safety assessment:   Expected prognosis >1 year  Risk: Low (ORT 0)  Indication for Opioid Use: Moderate or Strong  Baseline UDT performed on: not sent yet  Naloxone Script provided if patient also on benzodiazepine: 12/4/2019   Indications for Tapering reviewed: yes,3/26/20     No pertinent allergies      Lab and imaging data reviewed:  Comments:         Video-Visit Details    Type of service:  Video Visit    Physician has received verbal consent for a Video Visit from the patient? Yes    Video Start Time: 1108    Video End Time (time video stopped): 1149    Originating Location (pt. Location): Home    Distant Location (provider location):  [facility]     Mode of Communication:  Video Conference via [facility]    Alva Whitaker MD  Palliative Medicine  Pager [phone]

## 2021-06-28 NOTE — NURSING NOTE
Shaneka is a 61 year old who is being evaluated via a billable video visit.      How would you like to obtain your AVS? MyChart  If the video visit is dropped, the invitation should be resent by: Text to cell phone: [phone]  Will anyone else be joining your video visit? No      Video-Visit Details    Type of service:  Video Visit    Originating Location (pt. Location): Home in [place]    Distant Location (provider location):  [facility]     Platform used for Video Visit: Imtiaz     Concerns:  No concerns, patient did say that she has a migraine today, pain 7/10.    Rocío James CMA

## 2021-06-28 NOTE — PATIENT INSTRUCTIONS
Patient Instructions      Expand All Collapse All    Thank you for attending the Palliative Care Clinic appointment today.     We didn't make any changes in your treatment plan today.     Enjoy your e-Bike!     Call if your symptoms change and the medications we have prescribed are not working.   Do not take your medications at any other dose or frequently than how they are prescribed. Call if you think we should make any changes  I have prescribed naloxone as a rescue medication for the opioids (pain pills), which I do for all of my patients who have these medications at home.    Call us at least 3 workdays in advance if you need a medication refill.    Please have all your pill bottles ready for your next appointment.     Return to clinic in 2-3 months for a follow up.    You can reach the Palliative Care Team during business hours at the following number:    - [phone]  - or send me a [employer] message    To reach the Palliative Care Provider on-call After-hours or on holidays and weekends, call: [phone].  Please note that we are not able to provide pain medication refills on evenings or weekends.

## 2021-06-28 NOTE — LETTER
6/28/2021         RE: Shaneka Alvarez  [id number] [facility] [address]        Dear Colleague,    Thank you for referring your patient, Shaneka Alvarez, to the [facility]. Please see a copy of my visit note below.    Palliative Care Outpatient Clinic Progress Note    Patient Name: Shaneka Alvarez is being evaluated via a billable video visit.    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 Didn't tolerate Xeloda, paclitaxel Nov 2019-Jan 2020, treated with eribulin and zometa. PD seen on PET Nov 2020 as well as a new L 4th rib concerning for a pathologic fracture. Started Trodelvy 11/11/20              - admission Dec 2020 for presumed treatment side effects, continued with Trodelvy at reduced dose. Minimal progression on PET June 2021, decision to continue with Trodelvy  - b/l PE found on PET July 2020, on anticoagulation  - MS with mild right-sided weakness          Interim History:  At the last visit we discussed her pain, other than a new mild neck pain it had been well controlled with OME of 45. Further workup was negative for a new, significant bony met at the affected location.     Patient is on the call by herself.     The neck pain she developed around the time of our last visit has improved. Her pain is now mostly in her lower back again. Most days she takes 2 tablets MSIR, occasionally she takes 4 tablets. She also takes MS Contin 15mg at night.     We discuss her experience with morphine. She  doesn't feel her mood or engagement with activities, experiences, and people around her has changed since she was started on opioids.    She is aware of the slight progression on her last imaging. In a conversation with Dr Zepeda they decided to continue with the current treatment plan.     Coping:  She has been feeling well and is enjoying that.     Social History:  Pertinent changes to social history/social situation since last visit: no major changes.   She got an e-Trike for her birthday and enjoys being out and about with that    Key support resources:     Advance Directive Status:  POLST completed 20: DNR/selective treatments    Functional Status:  1 (Restricted in physically strenuous activity but ambulatory and able to carry out work of a light or sedentary nature)        Impression & Recommendations & Counseliny/o woman with metastatic breast cancer currently on Trodelvy.     Pain: currently adequate control with MS Contin 15mg HS plus 2, occasionally 4x 15mg MSIR daily. She remains active. No change in type or location of her pain.   Discussed buprenorphine. Considering her lack of side effects, stable dose, and good pain control, will hold off a rotation at this time.     Return in 2-3 months    Data / Chart Review:    Review of Systems:   ROS: 10 point ROS neg other than the symptoms noted above in the HPI and pertinents here.         Physical Exam:   Physical Exam:  Vital Signs not checked, virtual visit    CONSTIT: awake, appears comfortable  EENT: MMM, EOMI, no icterus  RESP: reg, nl effort, no cough  MSK: moves x4, SABINA  SKIN: no rash, no obvious lesions  NEURO: alert, oriented x3  PSYCH: appropriate affect, memory and thought process intact    The rest of a comprehensive physical examination is deferred  due to public health emergency video visit restrictions.        Current pertinent medications:  MS Contin 15mg HS  MSIR 15-30mg q3h prn              Naloxone  prescribed  acetaminophen 1000mg tid  CBD cream     Dexamethasone 4mg q12h     Venlafaxine 225mg daily  Lorazepam 1mg prn  Trazodone 50mg HS  Buspirone 15mg bid     Olanzapine prn after chemo  Promethazine q6h prn  Senna bid prn, miralax bid prn    : ok, slightly early fill 6/10 (after 5/19)    Opioid safety assessment:   Expected prognosis >1 year  Risk: Low (ORT 0)  Indication for Opioid Use: Moderate or Strong  Baseline UDT performed on: not sent yet  Naloxone Script provided if patient also on benzodiazepine: 12/4/2019   Indications for Tapering reviewed: yes,3/26/20     No pertinent allergies      Lab and imaging data reviewed:  Comments:         Video-Visit Details    Type of service:  Video Visit    Physician has received verbal consent for a Video Visit from the patient? Yes    Video Start Time: 1108    Video End Time (time video stopped): 1149    Originating Location (pt. Location): Home    Distant Location (provider location):  [facility]     Mode of Communication:  Video Conference [facility]    Alva Whitaker MD  Palliative Medicine  Pager [phone]        Again, thank you for allowing me to participate in the care of your patient.        Sincerely,        Alva Whitaker MD

## 2021-06-29 ENCOUNTER — TELEPHONE (OUTPATIENT)
Dept: FAMILY MEDICINE | Facility: OTHER | Age: 61
End: 2021-06-29

## 2021-06-29 NOTE — TELEPHONE ENCOUNTER
I found the MyChart mention of brain metastasis that I mentioned to Dr. Zepeda during today's video visit. The note was written on 5/26/21 by Annie WRIGHT It mistakenly mentions brain metastasis.

## 2021-07-01 DIAGNOSIS — G47.00 INSOMNIA, UNSPECIFIED TYPE: ICD-10-CM

## 2021-07-01 RX ORDER — TRAZODONE HYDROCHLORIDE 50 MG/1
TABLET, FILM COATED ORAL
Qty: 30 TABLET | Refills: 1 | Status: SHIPPED | OUTPATIENT
Start: 2021-07-01 | End: 2021-09-21

## 2021-07-07 ENCOUNTER — VIRTUAL VISIT (OUTPATIENT)
Dept: ONCOLOGY | Facility: CLINIC | Age: 61
End: 2021-07-07
Payer: COMMERCIAL

## 2021-07-07 ENCOUNTER — INFUSION THERAPY VISIT (OUTPATIENT)
Dept: INFUSION THERAPY | Facility: CLINIC | Age: 61
End: 2021-07-07
Payer: COMMERCIAL

## 2021-07-07 VITALS
SYSTOLIC BLOOD PRESSURE: 113 MMHG | BODY MASS INDEX: 26.43 KG/M2 | HEART RATE: 71 BPM | DIASTOLIC BLOOD PRESSURE: 81 MMHG | WEIGHT: 143.6 LBS | HEIGHT: 62 IN | OXYGEN SATURATION: 100 %

## 2021-07-07 VITALS — WEIGHT: 143 LBS | BODY MASS INDEX: 26.16 KG/M2

## 2021-07-07 DIAGNOSIS — C50.912 BILATERAL MALIGNANT NEOPLASM OF BREAST IN FEMALE, UNSPECIFIED ESTROGEN RECEPTOR STATUS, UNSPECIFIED SITE OF BREAST (H): ICD-10-CM

## 2021-07-07 DIAGNOSIS — T45.1X5A CHEMOTHERAPY-INDUCED NEUTROPENIA (H): ICD-10-CM

## 2021-07-07 DIAGNOSIS — C50.919 METASTATIC BREAST CANCER: Primary | ICD-10-CM

## 2021-07-07 DIAGNOSIS — D70.1 CHEMOTHERAPY-INDUCED NEUTROPENIA (H): ICD-10-CM

## 2021-07-07 DIAGNOSIS — C50.911 BILATERAL MALIGNANT NEOPLASM OF BREAST IN FEMALE, UNSPECIFIED ESTROGEN RECEPTOR STATUS, UNSPECIFIED SITE OF BREAST (H): ICD-10-CM

## 2021-07-07 DIAGNOSIS — C50.919 METASTATIC BREAST CANCER: ICD-10-CM

## 2021-07-07 DIAGNOSIS — E87.6 HYPOKALEMIA: Primary | ICD-10-CM

## 2021-07-07 LAB
ALBUMIN SERPL-MCNC: 3.3 G/DL (ref 3.4–5)
ALP SERPL-CCNC: 63 U/L (ref 40–150)
ALT SERPL W P-5'-P-CCNC: 33 U/L (ref 0–50)
ANION GAP SERPL CALCULATED.3IONS-SCNC: 3 MMOL/L (ref 3–14)
AST SERPL W P-5'-P-CCNC: 21 U/L (ref 0–45)
BASOPHILS # BLD AUTO: 0 10E9/L (ref 0–0.2)
BASOPHILS NFR BLD AUTO: 0.9 %
BILIRUB SERPL-MCNC: 0.4 MG/DL (ref 0.2–1.3)
BUN SERPL-MCNC: 15 MG/DL (ref 7–30)
CALCIUM SERPL-MCNC: 9.9 MG/DL (ref 8.5–10.1)
CANCER AG27-29 SERPL-ACNC: 729 U/ML (ref 0–39)
CHLORIDE SERPL-SCNC: 104 MMOL/L (ref 94–109)
CO2 SERPL-SCNC: 31 MMOL/L (ref 20–32)
CREAT SERPL-MCNC: 0.69 MG/DL (ref 0.52–1.04)
DIFFERENTIAL METHOD BLD: ABNORMAL
EOSINOPHIL # BLD AUTO: 0.1 10E9/L (ref 0–0.7)
EOSINOPHIL NFR BLD AUTO: 3.1 %
ERYTHROCYTE [DISTWIDTH] IN BLOOD BY AUTOMATED COUNT: 14.8 % (ref 10–15)
GFR SERPL CREATININE-BSD FRML MDRD: >90 ML/MIN/{1.73_M2}
GLUCOSE SERPL-MCNC: 103 MG/DL (ref 70–99)
HCT VFR BLD AUTO: 37.6 % (ref 35–47)
HGB BLD-MCNC: 12.4 G/DL (ref 11.7–15.7)
IMM GRANULOCYTES # BLD: 0 10E9/L (ref 0–0.4)
IMM GRANULOCYTES NFR BLD: 0.4 %
LYMPHOCYTES # BLD AUTO: 0.6 10E9/L (ref 0.8–5.3)
LYMPHOCYTES NFR BLD AUTO: 13.7 %
MAGNESIUM SERPL-MCNC: 2.1 MG/DL (ref 1.6–2.3)
MCH RBC QN AUTO: 28.9 PG (ref 26.5–33)
MCHC RBC AUTO-ENTMCNC: 33 G/DL (ref 31.5–36.5)
MCV RBC AUTO: 88 FL (ref 78–100)
MONOCYTES # BLD AUTO: 0.7 10E9/L (ref 0–1.3)
MONOCYTES NFR BLD AUTO: 15.5 %
NEUTROPHILS # BLD AUTO: 3 10E9/L (ref 1.6–8.3)
NEUTROPHILS NFR BLD AUTO: 66.4 %
PHOSPHATE SERPL-MCNC: 5.2 MG/DL (ref 2.5–4.5)
PLATELET # BLD AUTO: 173 10E9/L (ref 150–450)
POTASSIUM SERPL-SCNC: 3.9 MMOL/L (ref 3.4–5.3)
PROT SERPL-MCNC: 6.8 G/DL (ref 6.8–8.8)
RBC # BLD AUTO: 4.29 10E12/L (ref 3.8–5.2)
SODIUM SERPL-SCNC: 138 MMOL/L (ref 133–144)
WBC # BLD AUTO: 4.5 10E9/L (ref 4–11)

## 2021-07-07 PROCEDURE — 80053 COMPREHEN METABOLIC PANEL: CPT | Performed by: INTERNAL MEDICINE

## 2021-07-07 PROCEDURE — 99207 PR NO CHARGE NURSE ONLY: CPT

## 2021-07-07 PROCEDURE — 99214 OFFICE O/P EST MOD 30 MIN: CPT | Mod: GT | Performed by: INTERNAL MEDICINE

## 2021-07-07 PROCEDURE — 86300 IMMUNOASSAY TUMOR CA 15-3: CPT | Performed by: INTERNAL MEDICINE

## 2021-07-07 PROCEDURE — 84100 ASSAY OF PHOSPHORUS: CPT | Performed by: INTERNAL MEDICINE

## 2021-07-07 PROCEDURE — S0028 INJECTION, FAMOTIDINE, 20 MG: HCPCS | Performed by: NURSE PRACTITIONER

## 2021-07-07 PROCEDURE — 96367 TX/PROPH/DG ADDL SEQ IV INF: CPT | Performed by: NURSE PRACTITIONER

## 2021-07-07 PROCEDURE — 83735 ASSAY OF MAGNESIUM: CPT | Performed by: INTERNAL MEDICINE

## 2021-07-07 PROCEDURE — 99207 PR NO CHARGE LOS: CPT

## 2021-07-07 PROCEDURE — 96413 CHEMO IV INFUSION 1 HR: CPT | Performed by: NURSE PRACTITIONER

## 2021-07-07 PROCEDURE — 96375 TX/PRO/DX INJ NEW DRUG ADDON: CPT | Performed by: NURSE PRACTITIONER

## 2021-07-07 PROCEDURE — 85025 COMPLETE CBC W/AUTO DIFF WBC: CPT | Performed by: INTERNAL MEDICINE

## 2021-07-07 RX ORDER — SODIUM CHLORIDE 9 MG/ML
1000 INJECTION, SOLUTION INTRAVENOUS CONTINUOUS PRN
Status: CANCELLED
Start: 2021-07-14

## 2021-07-07 RX ORDER — EPINEPHRINE 1 MG/ML
0.3 INJECTION, SOLUTION INTRAMUSCULAR; SUBCUTANEOUS EVERY 5 MIN PRN
Status: CANCELLED | OUTPATIENT
Start: 2021-07-14

## 2021-07-07 RX ORDER — METHYLPREDNISOLONE SODIUM SUCCINATE 125 MG/2ML
125 INJECTION, POWDER, LYOPHILIZED, FOR SOLUTION INTRAMUSCULAR; INTRAVENOUS
Status: CANCELLED
Start: 2021-07-14

## 2021-07-07 RX ORDER — ACETAMINOPHEN 325 MG/1
650 TABLET ORAL ONCE
Status: CANCELLED | OUTPATIENT
Start: 2021-07-14

## 2021-07-07 RX ORDER — LORAZEPAM 2 MG/ML
0.5 INJECTION INTRAMUSCULAR EVERY 4 HOURS PRN
Status: CANCELLED
Start: 2021-07-14

## 2021-07-07 RX ORDER — ATROPINE SULFATE 0.4 MG/ML
0.4 AMPUL (ML) INJECTION
Status: CANCELLED | OUTPATIENT
Start: 2021-07-14

## 2021-07-07 RX ORDER — DIPHENHYDRAMINE HCL 25 MG
50 CAPSULE ORAL ONCE
Status: CANCELLED | OUTPATIENT
Start: 2021-07-14

## 2021-07-07 RX ORDER — HEPARIN SODIUM (PORCINE) LOCK FLUSH IV SOLN 100 UNIT/ML 100 UNIT/ML
5 SOLUTION INTRAVENOUS
Status: DISCONTINUED | OUTPATIENT
Start: 2021-07-07 | End: 2021-07-07 | Stop reason: HOSPADM

## 2021-07-07 RX ORDER — ALBUTEROL SULFATE 0.83 MG/ML
2.5 SOLUTION RESPIRATORY (INHALATION)
Status: CANCELLED | OUTPATIENT
Start: 2021-07-14

## 2021-07-07 RX ORDER — MEPERIDINE HYDROCHLORIDE 25 MG/ML
25 INJECTION INTRAMUSCULAR; INTRAVENOUS; SUBCUTANEOUS EVERY 30 MIN PRN
Status: CANCELLED | OUTPATIENT
Start: 2021-07-14

## 2021-07-07 RX ORDER — DIPHENHYDRAMINE HYDROCHLORIDE 50 MG/ML
50 INJECTION INTRAMUSCULAR; INTRAVENOUS
Status: CANCELLED
Start: 2021-07-14

## 2021-07-07 RX ORDER — NALOXONE HYDROCHLORIDE 0.4 MG/ML
.1-.4 INJECTION, SOLUTION INTRAMUSCULAR; INTRAVENOUS; SUBCUTANEOUS
Status: CANCELLED | OUTPATIENT
Start: 2021-07-14

## 2021-07-07 RX ORDER — HEPARIN SODIUM,PORCINE 10 UNIT/ML
5 VIAL (ML) INTRAVENOUS
Status: CANCELLED | OUTPATIENT
Start: 2021-07-14

## 2021-07-07 RX ORDER — ALBUTEROL SULFATE 90 UG/1
1-2 AEROSOL, METERED RESPIRATORY (INHALATION)
Status: CANCELLED
Start: 2021-07-14

## 2021-07-07 RX ORDER — HEPARIN SODIUM (PORCINE) LOCK FLUSH IV SOLN 100 UNIT/ML 100 UNIT/ML
5 SOLUTION INTRAVENOUS
Status: CANCELLED | OUTPATIENT
Start: 2021-07-14

## 2021-07-07 RX ORDER — HEPARIN SODIUM (PORCINE) LOCK FLUSH IV SOLN 100 UNIT/ML 100 UNIT/ML
500 SOLUTION INTRAVENOUS ONCE
Status: COMPLETED | OUTPATIENT
Start: 2021-07-07 | End: 2021-07-07

## 2021-07-07 RX ORDER — ACETAMINOPHEN 325 MG/1
650 TABLET ORAL ONCE
Status: COMPLETED | OUTPATIENT
Start: 2021-07-07 | End: 2021-07-07

## 2021-07-07 RX ORDER — DIPHENHYDRAMINE HCL 25 MG
50 CAPSULE ORAL ONCE
Status: COMPLETED | OUTPATIENT
Start: 2021-07-07 | End: 2021-07-07

## 2021-07-07 RX ADMIN — HEPARIN SODIUM (PORCINE) LOCK FLUSH IV SOLN 100 UNIT/ML 5 ML: 100 SOLUTION at 13:26

## 2021-07-07 RX ADMIN — Medication 500 ML: at 10:58

## 2021-07-07 RX ADMIN — Medication 50 MG: at 11:00

## 2021-07-07 RX ADMIN — ACETAMINOPHEN 650 MG: 325 TABLET ORAL at 11:00

## 2021-07-07 RX ADMIN — HEPARIN SODIUM (PORCINE) LOCK FLUSH IV SOLN 100 UNIT/ML 500 UNITS: 100 SOLUTION at 09:04

## 2021-07-07 ASSESSMENT — MIFFLIN-ST. JEOR: SCORE: 1179.62

## 2021-07-07 ASSESSMENT — PAIN SCALES - GENERAL
PAINLEVEL: NO PAIN (0)
PAINLEVEL: NO PAIN (0)

## 2021-07-07 NOTE — PROGRESS NOTES
ONCOLOGY FOLLOW UP NOTE: 7/07/21        I took the history from reviewing the previous notes that she was following with Dr. Amaya.  I have copied and updated from prior notes.    ONCOLOGY History:    1.  January 2006:  Diagnosed with Stage IIB, T2 N1 M0 invasive lobular carcinoma of the right breast.  Final pathology showed a 4.5 x 3.8 x 2.5 cm, 01/14 lymph nodes positive.  Estrogen, progesterone receptor positive, HER-2 negative.     2.  Genetics.  BRCA1 and 2 mutations not detected. 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       THERAPY TO DATE:   1.  2006:  ECOG 2104 protocol of dose-dense Adriamycin, Cytoxan and Avastin x4 cycles followed by Taxol and Avastin x4 cycles.   2.  03/2007:  Completed 1 year Avastin.   3.  11/2006-01/2007:  Radiotherapy to the right breast of 5040 cGy.   4.  03/20071913-7745:  Aromasin.  Stopped after moving to the [facility].   5.  01/2016:  Right modified radical mastectomy with latissimus dorsi flap reconstruction and a left prophylactic mastectomy with latissimus dorsi flap reconstruction.     She recently had MRI of the brain as a follow-up for multiple sclerosis and there were multiple calvarial lesions noted which was suspicious for metastatic disease.  There was no evidence of new multiple sclerosis lesions.  She had a PET scan on 8/30/2019 which showed widespread bone metastatic lesions (calvarium, spine, sacrum, pelvis, ribs most prominent at C7, T3, L1 and L4), hypermetabolic 3 cm lesion in LLL, and mediastinal/hilar LN. CEA wnl at 1.9 and C27-29 elevated to 415 (28 on 8/23/16). A CT guided biopsy of the right iliac bone was obtained on 9/4/19, pathology consistent with metastatic adenocarcinoma (breast), ER/SC negative, HER-2 negative PDL 1 < 1%. A second biopsy was take of the LLL nodule via EBUS on 9/5/19 did not show any malignancy.    C/T/L spine MRI 8/3/19 to 9/2/19 with numerous enhancing lesions of the C, T and L spine, largest around T9 and L1. No evidence  of cord compression. Has a L1 compression fracture and impending L4.     Received radiation T12-L5 3000 cGy in 10 fractions from 9/6/2019 till 9/18/2019.  Neurosurgery recommended no surgical intervention but to wear Gorge brace when out of bed and HOB >30 degrees    She started palliative Xeloda 2000/1500 on 10/1/2019 but after just a few doses she noticed nausea, red eyes blurred vision, loss of appetite.  She stopped taking it after 5 doses and was seen by nurse practitioner on 10/7/2019 when she was improving.  At that point she was restarted on Xeloda at a lower dose of 1000 mg twice a day.  As she was not able to tolerate even the lower dose with extreme nausea and vomiting and feeling extremely fatigued and blurry vision, we decided on stopping Xeloda.    We decided on repeating scans and MRI brain on 10/25/2019 showed no intracranial metastatic disease.   Multiple enhancing calvarial lesions may be increased in number and are suggestive of metastatic disease. Thinner imaging on the current study may identify the smaller lesions and may be responsible for  identified more lesions.   There is a single focus of T2 hyperintense signal within the anterior right temporal lobe may represent interval demyelination. There is no evidence for active demyelination.    PET/CT on 11/1/2019 showed favorable response to therapy and Overall FDG uptake within scattered osseous metastases is decreased since 8/30/2019, particularly within the pelvis. Increased sclerotic appearance of L1 and L4 pathologic compression deformities, likely sequela of recently completed palliative radiation therapy.    No significant FDG uptake in previously demonstrated hypermetabolic mediastinal lymph nodes. Biopsy negative on 9/5/2019.  There is also decreased FDG uptake in the left lower lobe (biopsy negative for  malignancy), now with dense consolidation containing air bronchograms suggestive of infection versus aspiration.  There is diffuse  FDG uptake within the esophagus, consistent with esophagitis.    Because of intolerance to Xeloda we decided on switching to weekly paclitaxel on 11/5/2019.    C#2 Taxol 12/4/19- started with 70mg/m2 dose reduction    C#3 Taxol 12/30/2019     PET/CT on 1/24/2020 showed progression of the disease in some of the bone lesions including increase in size and lytic lesion within the vertebral body of C4 measuring 1 cm as opposed to 0.6 cm previously and a new central soft tissue nodule with SUV max 4.1 when previously it was non-hypermetabolic.  There is also some progression noted in the right proximal femur and right iliac bone.  There is decreased metabolic uptake in the right lamina of T9 with SUV max 4.7 when previously it was 8.0.  Other lesions are stable.    CA 27-29 also had increased significantly and it was 257 on 1/28/2020.    We decided on switching to eribulin on 1/28/2020.    C#2 2/18/2020  C#2 D#8 2/25/2020    C#3 D#1 3/18/2020 -delayed by 1 week as per patient's preference because she wanted to visit her family.    She had a repeat PET/CT on 3/27/2020 which showed stable size of the bone metastatic lesions although the FDG avidity was less, consistent with treatment response.    She had MRI of the thoracic spine on 4/3/2020 and it was compared to the one which was done in August 2019 and it showed increased size of several metastatic lesions most notably at T9 but also at T5-T7.  Other lesions at T10, T11 and T12 appeared improved.    CA 27-29 was also down to 222 on 3/18/2020.    Cycle #4 eribulin ( dose reduced to 1.2 mg/m2 )-4/7/2020-   Cycle #5 Eribulin ( 1.2 mg/m2 )- 4/28/2020   Cycle #6 Eribulin ( 1.2 mg/m2 )- 5/19/2020     Cycle #7 Eribulin ( 1.2 mg/m2 )- 6/9/2020    Cycle #8 Eribulin ( 1.2 mg/m2 )- 6/30/2020     She had a repeat PET scan on 7/17/2020 which showed incidental finding of new acute bilateral pulmonary embolism in the distal left main pulmonary artery extending in the left upper and  lower lobes and in the segmental and branch arteries in the right lower lobe.    It also showed mildly increased FDG avidity in the lytic lesion in the T9 lamina and right pedicle with SUV max 5.3, previously 3.9.  That is also slightly increased FDG uptake to the left anterior fifth rib at the costochondral junction SUV max 3.9, previously 2.5.  There is slightly decreased FDG uptake in the right femoral neck lesion SUV max 2.2, previously 2.9.  FDG uptake in other bone lesions is fairly stable.  No new metastasis are seen.    CA 27-29 was 308 on 6/30/2020.  It was 286 on 5/19/2020.    Overall this is consistent with only slight progression versus stable disease.    She was started on Lovenox.    Cycle #9 eribulin 7/21/2020. CA 27-29 was 238    C#10 eribulin 8/18/2020. ( delayed by one week for ANC 0.9 )    C#11 Eribulin 9/8/2020    C#12 Eribulin 9/29/2020     C#13 Eribulin 10/20/2020     PET scan on 11/6/2020 shows progression of the disease with increased FDG uptake in the lytic lesions in the T9/T10 and right posterolateral elements as well as increased FDG uptake in the previously known hypermetabolic right iliac bone lesion suggesting recurrence of previously treated metastasis.  There is new subtle lesion in the right manubrium.  There is also a new fracture in the anterolateral left fourth rib with associated uptake and this could be a pathologic fracture.  Healing fracture in the left anterior fifth rib lesion.  There is resolution of the left pulmonary emboli.  Decreased FDG uptake of the esophagus consistent with improving inflammation.    CA 27-29 on 10/20/2020 was also elevated at 489.    C#1 Trodelvy on 11/11/2020.  Cycle #2 Trodelvy 12/2/2020  Cycle number 2-day #8 Trodelvy 12/8/2020.    12/15/2020-12/16/2020.  She was admitted to the hospital with nausea vomiting and dehydration.  She had tachycardia and initial lactic acid of 5.  It decreased to 1.4 after 2 L of normal saline.  She also had  hypokalemia and ANC was 1.3.  She was treated with IV fluids.  Procalcitonin was negative.  CTA of the chest was negative for pulmonary embolism or pneumonia.  No bacterial infection was documented.  Her medication which she was initially unable to tolerate at home, were restarted and she was discharged home once started to feel better.      We delayed the start of cycle number 3 x 1 week and decrease the dose of chemotherapy by 25%.  She also had neutropenia with ANC of 1.0.      She started cycle number 3-day #1 on 12/29/2020.  CA 27-29 was 392.    Cycle number 3-day #8 1/5/2021.    C#4 Trodelvy 1/20/2021- 75% dose    2/5/2021.  PET/CT overall shows a good response to treatment with improvement of previously hypermetabolic lesions in the spine and pelvis and stability of other bone meta stasis.  There is a single lytic lesion in the right iliac bone which demonstrates slight Yimi increased FDG uptake and has SUV max 4.7 while previously it was SUV max 3.4.  There was diffuse wall thickening of the esophagus with uptake in the esophagus in the fundus of the stomach and this could be seen with inflammation.    Trodelvy cycle #5 - 2/10/2021.  75% of the dose.  CA 27-29 was 337.    Trodelvy cycle #6 - 3/3/2021.  75% of the dose.  Trodelvy cycle #6, D#8 - 3/10/2021.  75% of the dose.    Trodelvy C#8, D#8 on 4/21/2021  CA 27-29 stable at 371 on 4/14/2021    Trodelvy, cycle #9- 5/5/2021        On 2/25/2020 when she had biopsy of the right acetabulum and it was consistent with metastatic lobular carcinoma.  Again it was Triple Negative.     On 3/18/2020 when she also met with Dr. Arelis Arshad who also advised testing for androgen receptor studies on the biopsy specimen.  Tumor was diffusely androgen receptor positive.      5/26/2021-cycle #10 Trodelvy started    CA 27-29 was 545.    6/11/2021.  PET/CT shows mildly increased uptake in several bone lesions for example in the left anterior iliac crest, mid right iliac crest and  left ischium.  Uptake in other bone lesions are similar to previously.    Because of minimal progression of the disease and she is tolerating chemotherapy very well, we decided on continuing the same chemotherapy.    6/16/2021-Trodelvy cycle #11    7/7/2021 -Trodelvy cycle #12    Interval history.  This is a video visit.  She tolerated last chemotherapy well.  She did not have nausea. BMs were fine as long as she takes senna.  No rash and no worsening neuropathy. No new swellings. No dyspnea. No infections.  Pain is under fair control and she takes MS Contin and morphine sulfate only at night.        ECOG 0-1    ROS:  Rest of the comprehensive review of the system was unremarkable.        I reviewed other history in epic as below.       PAST MEDICAL HISTORY:     1.  Breast cancer  2.  Multiple sclerosis.   3.  Depression.   4.  Migraines.   5.  Hypertension.   6.  Cholecystectomy and umbilical hernia repair.     SOCIAL HISTORY: She smoked for 5 years but quit many years ago.  Drinks alcohol socially.  She lives with her .  She is a teacher in middle school.       FAMILY HISTORY: She mentions that her mother had breast cancer at 49.  Both grandmothers had breast cancer but she is not sure of the age.  A couple of cousins have cancers.  Patient has 3 children who are healthy.    Current Outpatient Medications   Medication     acetaminophen (TYLENOL) 500 MG tablet     apixaban ANTICOAGULANT (ELIQUIS) 5 MG tablet     busPIRone (BUSPAR) 15 MG tablet     calcium citrate-vitamin D (CITRACAL) 315-250 MG-UNIT TABS     Cholecalciferol (VITAMIN D3 PO)     HEMP OIL OR EXTRACT OR OTHER CBD CANNABINOID, NOT MEDICAL CANNABIS,     loratadine (CLARITIN) 10 MG tablet     LORazepam (ATIVAN) 1 MG tablet     magnesium 250 MG tablet     morphine (MS CONTIN) 15 MG CR tablet     morphine (MSIR) 15 MG IR tablet     OLANZapine (ZYPREXA) 5 MG tablet     promethazine (PHENERGAN) 25 MG tablet     propranolol ER (INDERAL LA) 80 MG 24 hr  "capsule     senna-docusate (SENOKOT-S/PERICOLACE) 8.6-50 MG tablet     traZODone (DESYREL) 50 MG tablet     venlafaxine (EFFEXOR-XR) 75 MG 24 hr capsule     No current facility-administered medications for this visit.         Allergies   Allergen Reactions     Bactrim [Sulfamethoxazole W/Trimethoprim]      Internal bleeding     Copaxone [Glatiramer] Hives          PHYSICAL EXAMINATION:     /81 (BP Location: Left arm, Patient Position: Chair, Cuff Size: Adult Regular)   Pulse 71   Ht 1.575 m (5' 2\")   Wt 65.1 kg (143 lb 9.6 oz)   SpO2 100%   BMI 26.26 kg/m    Wt Readings from Last 4 Encounters:   07/07/21 65.1 kg (143 lb 9.6 oz)   06/28/21 66.7 kg (147 lb)   06/23/21 66.8 kg (147 lb 4.8 oz)   06/16/21 64.1 kg (141 lb 4.8 oz)         Constitutional.  Looks well and in no apparent distress.   Eyes.  Without eye redness or apparent jaundice.   Respiratory.  Non labored breathing. Speaking in full sentences.    Skin.  No concerning skin rashes on the skin visualized.   Neurological.  Is alert and oriented.  Psychiatric.  Mood and affect seem appropriate.      The rest of a comprehensive physical examination is deferred due to Public Health Emergency video visit restrictions.      Labs and Imaging.    Reviewed.    7/7/2021  CBC-unremarkable.      CMP shows normal kidney functions.  Phosphorus 5.2.  Albumin 3.3.  Normal LFTs.    CA 27-29 was 465 on 6/16/2021.  It was 545 on 5/26/2021 6/11/2021.  PET/CT shows mildly increased uptake in several bone lesions for example in the left anterior iliac crest, mid right iliac crest and left ischium.  Uptake in other bone lesions are similar to previously.  The right eye iliac crest lesion which on PET CT in February 2021 showed mildly increased uptake as compared to previously, now seems stable.      Biopsy from the right acetabulum shows metastatic lobular carcinoma.  It is triple negative.  Androgen receptor was diffusely positive (90%, moderate intensity) by " immunohistochemistry. )  PD-L1 negative.      Foundation 1 testing did not reveal any actionable mutations.          ASSESSMENT AND PLAN:     Now she has metastatic breast cancer negative breast cancer (androgen receptor positive ) with extensive involvement of the bones.  There is also a left lower lobe hypermetabolic lesion and mediastinal lymph nodes which are hypermetabolic.  The biopsy from the right iliac bone is consistent with metastatic lobular breast cancer but it is hormone receptor and HER-2 negative and PDL 1 is also negative.    Foundation 1 testing did not reveal any actionable mutations.    She was intolerant to Xeloda and progressed on Taxol and eribulin.      We then switched to recently FDA approved sacituzumab govitecan-hziy (Trodelvy) for TNBC, based on the results of the phase II IMMU-132-01 clinical trial.   She started this on 11/11/2020.      We delayed the start of cycle number 3 x 1 week and decrease the dose of chemotherapy by 25% and she also has neutropenia with ANC of 1.  She started cycle number 3-day #1 on 12/29/2020.      After 4 cycles of chemotherapy, overall the PET scan shows positive response to treatment apart from mildly increased FDG avidity in one of the right iliac bone lesions.  CA 27-29 was 454 slightly elevated from before.    We decided on continuing the same chemotherapy.      She obtained a second opinion from Dr. Castillo from [facility] who recommended a repeat biopsy to be done to make sure that she really has triple negative breast cancer.      She also met with Dr. Arelis Arshad on 3/18/2021.      She has completed 10 cycles of Trodelvy.  Last CA 27-29 was elevated from previously.      PET/CT on 6/11/2021 shows mildly increased uptake in some of the bone lesions while a stabilityin other bone lesions.      We discussed the situation in detail.      Because of minimal progression of the disease plus the fact that she has been tolerating treatments well,  we decided on continuing the same chemotherapy as in the big picture it seems to be meeting its goal of slowing down the growth of the cancer while keeping the treatments tolerable and maintaining good quality of life.     She has received 11 cycles of Trodelvy up until now and is tolerating it well.  Today we will proceed with cycle #12.  Last CA 27-29 was also down a little bit.    Upon progression I can consider Doxil every 4 weeks.     As the tumor is diffusely positive for androgen receptor, we will consider giving her androgen receptor blockers like Casodex or enzalutamide in the future.      Bone disease.  She has metastatic disease to the bone.  Previously she got palliative radiation to T12-L5 3000 cGy in 10 fractions from 9/6/2019 till 9/18/2019.  She was evaluated by orthopedics Dr. Bipin Elliott on 11/1/2019 and conservative management was recommended.    She was on Zometa every 3 months. She last received on 9/29/2020.  Because of progression of the disease in the bones, we switched to Xgeva which she received on 11/11/2020.  Continue Xgeva.  She last received on 6/16/2021.  Continue calcium and vitamin D.        Pain management.  Pain is under fair control with morphine sulfate immediate release and MS Contin.  Continue to follow with palliative care.      Bilateral pulmonary emboli.  She remains on  Eliquis for incidentally found PE on 7/17/2020.     Elevated Phosphorus. She is not on supplemental phosphorus. We will cont to monitor      Neuropathy.  Mild stable neuropathy in her hands.  This is in addition to the chronic balance issues and heat and cold sensitivity from underlying multiple sclerosis which is also stable for years.  Continue to observe.     Sensitivity to the scalp.  She does not report any skin rashes or swellings.  Overall this is mild.  Last PET scan did not show any worsening of osseous meta stasis to that area.  We will continue to observe for now.    We did not address the  following today.    Insomnia.  Continue trazodone.      Wall thickening of esophagus and FDG uptake of fundus of the stomach.  It could be in the setting of inflammation from recent nausea and vomiting.  Symptoms have resolved.  Continue to monitor clinically.      Subcutaneous nodule in the scalp.  I am not certain whether it is a cyst or a metastatic deposit.  PET scan does not cause this to light up.  Continue to observe.      Vision changes.    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  It was thought that this could be due to Taxol as patient reported to the ophthalmologist that she was on Taxol in September 2019 when her symptoms started.  But she was not on Taxol in September 2019 when she started noticing the visual changes so Taxol is not responsible for this.  The first dose of Taxol was on 11/5/2019.   She had left cataract extraction on 2/8/2021 and she has noticed significant improvement in her vision.  She plans to do cataract extraction of the right eye in couple of weeks.          Increased FDG ability in the colon.  She had FDG uptake in the cecum and ascending colon but no corresponding lesion was seen on the CT scan.  She is completely asymptomatic so at this time we will continue to observe.    Discussion regarding healthcare directives.  On previous visit she told me that she will bring the health care directive for our records but she forgot so I told her to bring it on next visit.      Breast implant removal.  She tells me that she was planning to do breast implant removal because there was a recall on this.  Her surgery was scheduled for 9/24/2019.  Because of this new development of metastatic breast cancer and her recent radiation, surgery has been canceled.  We discussed that at this time treatment for metastatic breast cancer would take precedence over the other surgery as the other surgery would require her to be off chemotherapy for several weeks  and in that case the chances of cancer progression would be high and I would not recommend that.    Multiple sclerosis.  She will continue to follow with her neurologist Dr. Quiles.  Currently multiple sclerosis is under control and currently she is not taking any medications.      Return to clinic in 3 weeks       All of her questions were answered to her satisfaction.  She is agreeable and comfortable with the plan.    Dewayen Zepeda MD    Video start time. 10:06 AM  Video stop time.10:16 AM

## 2021-07-07 NOTE — PROGRESS NOTES
Patient's port accessed using sterile procedure. Blood return noted. Gold, green and purple tube drawn, labeled and sent to lab. Port flushed with saline and heparin. Patient tolerated lab draw well.       Alycia Judge RN

## 2021-07-07 NOTE — NURSING NOTE
Shaneka is a 61 year old who is being evaluated via a billable video visit.      How would you like to obtain your AVS? MyChart  If the video visit is dropped, the invitation should be resent by: Text to cell phone: [phone]  Will anyone else be joining your video visit? No      Video-Visit Details    Type of service:  Video Visit    Originating Location (pt. Location): Other In clinic prior to tx    Distant Location (provider location):  [facility]     Platform used for Video Visit: Imtiaz       Has noticed that her skull is tender, started about 2 weeks ago    Eliza Thakur, CMA

## 2021-07-07 NOTE — PROGRESS NOTES
Infusion Nursing Note:  Shaneka Alvarez presents today for Trodelvy.    Patient seen by provider today: Yes: Dr. Zepeda video visit   present during visit today: Not Applicable.    Note: At end of infusion, RN noticed swelling around port site prior to deaccessing port. Pt had not noticed this prior and reported no pain or tenderness at site. No blood return noted from port. RN consulted with Charge nurse and pharmacy. Per pharmacy apply heat, no further interventions needed at this time. The site was swollen and cool to the touch. No redness noted. TAY Valencia came to assess further and recommend pt come back to clinic in 2 days to have nurse reassess site. Pt agreed with plan. Pt will call if any changes occur prior to visit.    Port was accessed again and heparin locked.    Intravenous Access:  Implanted Port.    Treatment Conditions:  Lab Results   Component Value Date    HGB 12.4 07/07/2021     Lab Results   Component Value Date    WBC 4.5 07/07/2021      Lab Results   Component Value Date    ANEU 3.0 07/07/2021     Lab Results   Component Value Date     07/07/2021      Lab Results   Component Value Date     07/07/2021                   Lab Results   Component Value Date    POTASSIUM 3.9 07/07/2021           Lab Results   Component Value Date    MAG 2.1 07/07/2021            Lab Results   Component Value Date    CR 0.69 07/07/2021                   Lab Results   Component Value Date    RAFAELA 9.9 07/07/2021                Lab Results   Component Value Date    BILITOTAL 0.4 07/07/2021           Lab Results   Component Value Date    ALBUMIN 3.3 07/07/2021                    Lab Results   Component Value Date    ALT 33 07/07/2021           Lab Results   Component Value Date    AST 21 07/07/2021       Post Infusion Assessment:  Patient tolerated infusion without incident.  See above noted.  Access discontinued per protocol.       Discharge Plan:   Patient will return 7/9 for next appointment.    Patient discharged in stable condition accompanied by: self.  Departure Mode: Ambulatory.      Cristina Loaiza RN

## 2021-07-09 ENCOUNTER — ONCOLOGY VISIT (OUTPATIENT)
Dept: ONCOLOGY | Facility: CLINIC | Age: 61
End: 2021-07-09
Payer: COMMERCIAL

## 2021-07-09 DIAGNOSIS — C50.919 METASTATIC BREAST CANCER: Primary | ICD-10-CM

## 2021-07-09 PROCEDURE — 99207 PR NO CHARGE LOS: CPT

## 2021-07-09 NOTE — PROGRESS NOTES
Patient here to have port assessed d/t infiltration on 7/6. Port site looks clean and intact. No swelling or redness noted. Patient denies any pain or tenderness at site. Pt agrees that the swelling has gone down and it looks like it is back to normal.    Cristina Loaiza RN

## 2021-07-13 DIAGNOSIS — C50.919 METASTATIC BREAST CANCER: ICD-10-CM

## 2021-07-13 DIAGNOSIS — C79.51 BONE METASTASES: Primary | ICD-10-CM

## 2021-07-14 ENCOUNTER — LAB (OUTPATIENT)
Dept: ONCOLOGY | Facility: CLINIC | Age: 61
End: 2021-07-14
Payer: COMMERCIAL

## 2021-07-14 ENCOUNTER — INFUSION THERAPY VISIT (OUTPATIENT)
Dept: INFUSION THERAPY | Facility: CLINIC | Age: 61
End: 2021-07-14
Payer: COMMERCIAL

## 2021-07-14 VITALS
WEIGHT: 144.8 LBS | HEART RATE: 71 BPM | SYSTOLIC BLOOD PRESSURE: 121 MMHG | BODY MASS INDEX: 26.48 KG/M2 | RESPIRATION RATE: 16 BRPM | TEMPERATURE: 98.3 F | OXYGEN SATURATION: 98 % | DIASTOLIC BLOOD PRESSURE: 86 MMHG

## 2021-07-14 DIAGNOSIS — D70.1 CHEMOTHERAPY-INDUCED NEUTROPENIA (H): ICD-10-CM

## 2021-07-14 DIAGNOSIS — E87.6 HYPOKALEMIA: Primary | ICD-10-CM

## 2021-07-14 DIAGNOSIS — C50.919 METASTATIC BREAST CANCER: ICD-10-CM

## 2021-07-14 DIAGNOSIS — C50.912 BILATERAL MALIGNANT NEOPLASM OF BREAST IN FEMALE, UNSPECIFIED ESTROGEN RECEPTOR STATUS, UNSPECIFIED SITE OF BREAST (H): ICD-10-CM

## 2021-07-14 DIAGNOSIS — T45.1X5A CHEMOTHERAPY-INDUCED NEUTROPENIA (H): Primary | ICD-10-CM

## 2021-07-14 DIAGNOSIS — T45.1X5A CHEMOTHERAPY-INDUCED NEUTROPENIA (H): ICD-10-CM

## 2021-07-14 DIAGNOSIS — C50.911 BILATERAL MALIGNANT NEOPLASM OF BREAST IN FEMALE, UNSPECIFIED ESTROGEN RECEPTOR STATUS, UNSPECIFIED SITE OF BREAST (H): ICD-10-CM

## 2021-07-14 DIAGNOSIS — C79.51 BONE METASTASES: ICD-10-CM

## 2021-07-14 DIAGNOSIS — E87.6 HYPOKALEMIA: ICD-10-CM

## 2021-07-14 DIAGNOSIS — D70.1 CHEMOTHERAPY-INDUCED NEUTROPENIA (H): Primary | ICD-10-CM

## 2021-07-14 LAB
ALBUMIN SERPL-MCNC: 3.1 G/DL (ref 3.4–5)
ALP SERPL-CCNC: 58 U/L (ref 40–150)
ALT SERPL W P-5'-P-CCNC: 25 U/L
ANION GAP SERPL CALCULATED.3IONS-SCNC: 3 MMOL/L (ref 3–14)
AST SERPL W P-5'-P-CCNC: 18 U/L (ref 0–45)
BASOPHILS # BLD AUTO: 0 10E3/UL (ref 0–0.2)
BASOPHILS # BLD MANUAL: 0 10E3/UL (ref 0–0.2)
BASOPHILS NFR BLD AUTO: 1 %
BASOPHILS NFR BLD MANUAL: 1 %
BILIRUB SERPL-MCNC: 0.3 MG/DL (ref 0.2–1.3)
BUN SERPL-MCNC: 8 MG/DL (ref 7–30)
CALCIUM SERPL-MCNC: 9.1 MG/DL (ref 8.5–10.1)
CHLORIDE BLD-SCNC: 106 MMOL/L
CO2 SERPL-SCNC: 30 MMOL/L (ref 20–32)
CREAT SERPL-MCNC: 0.65 MG/DL
EOSINOPHIL # BLD AUTO: 0.2 10E3/UL (ref 0–0.7)
EOSINOPHIL # BLD MANUAL: 0.3 10E3/UL (ref 0–0.7)
EOSINOPHIL NFR BLD AUTO: 4 %
EOSINOPHIL NFR BLD MANUAL: 9 %
ERYTHROCYTE [DISTWIDTH] IN BLOOD BY AUTOMATED COUNT: 14.5 % (ref 10–15)
GFR SERPL CREATININE-BSD FRML MDRD: >90 ML/MIN/1.73M2
GLUCOSE BLD-MCNC: 106 MG/DL (ref 70–99)
HCT VFR BLD AUTO: 36.8 % (ref 35–47)
HGB BLD-MCNC: 12.1 G/DL (ref 11.7–15.7)
HOLD SPECIMEN: NORMAL
LYMPHOCYTES # BLD AUTO: 0.5 10E3/UL (ref 0.8–5.3)
LYMPHOCYTES # BLD MANUAL: 0.8 10E3/UL (ref 0.8–5.3)
LYMPHOCYTES NFR BLD AUTO: 15 %
LYMPHOCYTES NFR BLD MANUAL: 23 %
MAGNESIUM SERPL-MCNC: 2.1 MG/DL (ref 1.6–2.3)
MCH RBC QN AUTO: 29.3 PG (ref 26.5–33)
MCHC RBC AUTO-ENTMCNC: 32.9 G/DL (ref 31.5–36.5)
MCV RBC AUTO: 89 FL (ref 78–100)
MONOCYTES # BLD AUTO: 0.4 10E3/UL (ref 0–1.3)
MONOCYTES # BLD MANUAL: 0.2 10E3/UL (ref 0–1.3)
MONOCYTES NFR BLD AUTO: 11 %
MONOCYTES NFR BLD MANUAL: 7 %
NEUTROPHILS # BLD AUTO: 2.4 10E3/UL (ref 1.6–8.3)
NEUTROPHILS # BLD MANUAL: 2.1 10E3/UL (ref 1.6–8.3)
NEUTROPHILS NFR BLD AUTO: 69 %
NEUTROPHILS NFR BLD MANUAL: 60 %
PATH REV: ABNORMAL
PHOSPHATE SERPL-MCNC: 4 MG/DL (ref 2.5–4.5)
PLAT MORPH BLD: ABNORMAL
PLATELET # BLD AUTO: 193 10E3/UL (ref 150–450)
POLYCHROMASIA BLD QL SMEAR: SLIGHT
POTASSIUM BLD-SCNC: 3.9 MMOL/L (ref 3.4–5.3)
PROT SERPL-MCNC: 6.2 G/DL (ref 6.8–8.8)
RBC # BLD AUTO: 4.13 10E6/UL (ref 3.8–5.2)
RBC MORPH BLD: ABNORMAL
SODIUM SERPL-SCNC: 139 MMOL/L (ref 133–144)
WBC # BLD AUTO: 3.5 10E3/UL (ref 4–11)

## 2021-07-14 PROCEDURE — 83735 ASSAY OF MAGNESIUM: CPT | Performed by: INTERNAL MEDICINE

## 2021-07-14 PROCEDURE — 80053 COMPREHEN METABOLIC PANEL: CPT | Performed by: INTERNAL MEDICINE

## 2021-07-14 PROCEDURE — 96372 THER/PROPH/DIAG INJ SC/IM: CPT | Mod: 59 | Performed by: NURSE PRACTITIONER

## 2021-07-14 PROCEDURE — 99207 PR NO CHARGE NURSE ONLY: CPT

## 2021-07-14 PROCEDURE — 84100 ASSAY OF PHOSPHORUS: CPT | Performed by: INTERNAL MEDICINE

## 2021-07-14 PROCEDURE — 96367 TX/PROPH/DG ADDL SEQ IV INF: CPT | Performed by: NURSE PRACTITIONER

## 2021-07-14 PROCEDURE — 85025 COMPLETE CBC W/AUTO DIFF WBC: CPT | Performed by: INTERNAL MEDICINE

## 2021-07-14 PROCEDURE — 96413 CHEMO IV INFUSION 1 HR: CPT | Performed by: NURSE PRACTITIONER

## 2021-07-14 PROCEDURE — S0028 INJECTION, FAMOTIDINE, 20 MG: HCPCS | Performed by: NURSE PRACTITIONER

## 2021-07-14 PROCEDURE — 99207 PR NO CHARGE LOS: CPT

## 2021-07-14 PROCEDURE — 96375 TX/PRO/DX INJ NEW DRUG ADDON: CPT | Performed by: NURSE PRACTITIONER

## 2021-07-14 RX ORDER — HEPARIN SODIUM (PORCINE) LOCK FLUSH IV SOLN 100 UNIT/ML 100 UNIT/ML
500 SOLUTION INTRAVENOUS ONCE
Status: COMPLETED | OUTPATIENT
Start: 2021-07-14 | End: 2021-07-14

## 2021-07-14 RX ORDER — ACETAMINOPHEN 325 MG/1
650 TABLET ORAL ONCE
Status: COMPLETED | OUTPATIENT
Start: 2021-07-14 | End: 2021-07-14

## 2021-07-14 RX ORDER — HEPARIN SODIUM (PORCINE) LOCK FLUSH IV SOLN 100 UNIT/ML 100 UNIT/ML
5 SOLUTION INTRAVENOUS
Status: DISCONTINUED | OUTPATIENT
Start: 2021-07-14 | End: 2021-07-14 | Stop reason: HOSPADM

## 2021-07-14 RX ORDER — DIPHENHYDRAMINE HCL 25 MG
50 CAPSULE ORAL ONCE
Status: COMPLETED | OUTPATIENT
Start: 2021-07-14 | End: 2021-07-14

## 2021-07-14 RX ADMIN — HEPARIN SODIUM (PORCINE) LOCK FLUSH IV SOLN 100 UNIT/ML 5 ML: 100 SOLUTION at 16:40

## 2021-07-14 RX ADMIN — Medication 500 ML: at 14:15

## 2021-07-14 RX ADMIN — HEPARIN SODIUM (PORCINE) LOCK FLUSH IV SOLN 100 UNIT/ML 500 UNITS: 100 SOLUTION at 12:32

## 2021-07-14 RX ADMIN — Medication 50 MG: at 14:09

## 2021-07-14 RX ADMIN — ACETAMINOPHEN 650 MG: 325 TABLET ORAL at 14:09

## 2021-07-14 ASSESSMENT — PAIN SCALES - GENERAL: PAINLEVEL: NO PAIN (0)

## 2021-07-14 NOTE — PROGRESS NOTES
Infusion Nursing Note:  Shaneka Alvarez presents today for: Trodelvy/Xgeva.    Patient seen by provider today: No   present during visit today: Not Applicable.    Note: patient tolerating regimen well.    No new symptoms or concerns    Intravenous Access:  Implanted Port.    Treatment Conditions:  Results reviewed, labs MET treatment parameters, ok to proceed with treatment.      Post Infusion Assessment:  Patient tolerated infusion without incident.       Discharge Plan:   RTC as scheduled.      PROSPER MCCRACKEN RN

## 2021-07-28 ENCOUNTER — MYC REFILL (OUTPATIENT)
Dept: PALLIATIVE CARE | Facility: CLINIC | Age: 61
End: 2021-07-28

## 2021-07-28 ENCOUNTER — INFUSION THERAPY VISIT (OUTPATIENT)
Dept: INFUSION THERAPY | Facility: CLINIC | Age: 61
End: 2021-07-28
Payer: COMMERCIAL

## 2021-07-28 ENCOUNTER — LAB (OUTPATIENT)
Dept: ONCOLOGY | Facility: CLINIC | Age: 61
End: 2021-07-28
Payer: COMMERCIAL

## 2021-07-28 ENCOUNTER — ONCOLOGY VISIT (OUTPATIENT)
Dept: ONCOLOGY | Facility: CLINIC | Age: 61
End: 2021-07-28
Payer: COMMERCIAL

## 2021-07-28 VITALS
SYSTOLIC BLOOD PRESSURE: 102 MMHG | OXYGEN SATURATION: 95 % | DIASTOLIC BLOOD PRESSURE: 71 MMHG | HEART RATE: 85 BPM | HEIGHT: 62 IN | RESPIRATION RATE: 14 BRPM | WEIGHT: 148.5 LBS | TEMPERATURE: 97.5 F | BODY MASS INDEX: 27.33 KG/M2

## 2021-07-28 DIAGNOSIS — R11.2 DRUG-INDUCED NAUSEA AND VOMITING: ICD-10-CM

## 2021-07-28 DIAGNOSIS — C79.51 BONE METASTASES: ICD-10-CM

## 2021-07-28 DIAGNOSIS — C50.912 BILATERAL MALIGNANT NEOPLASM OF BREAST IN FEMALE, UNSPECIFIED ESTROGEN RECEPTOR STATUS, UNSPECIFIED SITE OF BREAST (H): ICD-10-CM

## 2021-07-28 DIAGNOSIS — T45.1X5A CHEMOTHERAPY-INDUCED NEUTROPENIA (H): Primary | ICD-10-CM

## 2021-07-28 DIAGNOSIS — E83.39 HYPERPHOSPHATEMIA: ICD-10-CM

## 2021-07-28 DIAGNOSIS — D70.1 CHEMOTHERAPY-INDUCED NEUTROPENIA (H): Primary | ICD-10-CM

## 2021-07-28 DIAGNOSIS — T45.1X5A CHEMOTHERAPY-INDUCED NEUTROPENIA (H): ICD-10-CM

## 2021-07-28 DIAGNOSIS — T50.905A DRUG-INDUCED NAUSEA AND VOMITING: ICD-10-CM

## 2021-07-28 DIAGNOSIS — D70.1 CHEMOTHERAPY-INDUCED NEUTROPENIA (H): ICD-10-CM

## 2021-07-28 DIAGNOSIS — C50.919 METASTATIC BREAST CANCER: Primary | ICD-10-CM

## 2021-07-28 DIAGNOSIS — C50.911 BILATERAL MALIGNANT NEOPLASM OF BREAST IN FEMALE, UNSPECIFIED ESTROGEN RECEPTOR STATUS, UNSPECIFIED SITE OF BREAST (H): ICD-10-CM

## 2021-07-28 DIAGNOSIS — G35 MULTIPLE SCLEROSIS (H): ICD-10-CM

## 2021-07-28 DIAGNOSIS — E87.6 HYPOKALEMIA: ICD-10-CM

## 2021-07-28 DIAGNOSIS — I26.99 PULMONARY EMBOLISM WITHOUT ACUTE COR PULMONALE, UNSPECIFIED CHRONICITY, UNSPECIFIED PULMONARY EMBOLISM TYPE (H): ICD-10-CM

## 2021-07-28 DIAGNOSIS — E87.6 HYPOKALEMIA: Primary | ICD-10-CM

## 2021-07-28 DIAGNOSIS — C50.919 METASTATIC BREAST CANCER: ICD-10-CM

## 2021-07-28 DIAGNOSIS — G89.3 CANCER ASSOCIATED PAIN: ICD-10-CM

## 2021-07-28 LAB
ALBUMIN SERPL-MCNC: 3.3 G/DL (ref 3.4–5)
ALP SERPL-CCNC: 72 U/L (ref 40–150)
ALT SERPL W P-5'-P-CCNC: 22 U/L (ref 0–50)
ANION GAP SERPL CALCULATED.3IONS-SCNC: 7 MMOL/L (ref 3–14)
AST SERPL W P-5'-P-CCNC: 20 U/L (ref 0–45)
BASOPHILS # BLD AUTO: 0 10E3/UL (ref 0–0.2)
BASOPHILS NFR BLD AUTO: 1 %
BILIRUB SERPL-MCNC: 0.3 MG/DL (ref 0.2–1.3)
BUN SERPL-MCNC: 18 MG/DL (ref 7–30)
CALCIUM SERPL-MCNC: 8.5 MG/DL (ref 8.5–10.1)
CANCER AG27-29 SERPL-ACNC: 801 U/ML (ref 0–39)
CHLORIDE BLD-SCNC: 102 MMOL/L (ref 94–109)
CO2 SERPL-SCNC: 28 MMOL/L (ref 20–32)
CREAT SERPL-MCNC: 0.75 MG/DL (ref 0.52–1.04)
EOSINOPHIL # BLD AUTO: 0.2 10E3/UL (ref 0–0.7)
EOSINOPHIL NFR BLD AUTO: 4 %
ERYTHROCYTE [DISTWIDTH] IN BLOOD BY AUTOMATED COUNT: 14.6 % (ref 10–15)
GFR SERPL CREATININE-BSD FRML MDRD: 88 ML/MIN/1.73M2
GLUCOSE BLD-MCNC: 112 MG/DL (ref 70–99)
HCT VFR BLD AUTO: 34 % (ref 35–47)
HGB BLD-MCNC: 11 G/DL (ref 11.7–15.7)
IMM GRANULOCYTES # BLD: 0 10E3/UL
IMM GRANULOCYTES NFR BLD: 0 %
LYMPHOCYTES # BLD AUTO: 0.6 10E3/UL (ref 0.8–5.3)
LYMPHOCYTES NFR BLD AUTO: 13 %
MAGNESIUM SERPL-MCNC: 2 MG/DL (ref 1.6–2.3)
MCH RBC QN AUTO: 28.4 PG (ref 26.5–33)
MCHC RBC AUTO-ENTMCNC: 32.4 G/DL (ref 31.5–36.5)
MCV RBC AUTO: 88 FL (ref 78–100)
MONOCYTES # BLD AUTO: 0.7 10E3/UL (ref 0–1.3)
MONOCYTES NFR BLD AUTO: 15 %
NEUTROPHILS # BLD AUTO: 3.1 10E3/UL (ref 1.6–8.3)
NEUTROPHILS NFR BLD AUTO: 67 %
NRBC # BLD AUTO: 0 10E3/UL
NRBC BLD AUTO-RTO: 0 /100
PHOSPHATE SERPL-MCNC: 4.9 MG/DL (ref 2.5–4.5)
PLATELET # BLD AUTO: 173 10E3/UL (ref 150–450)
POTASSIUM BLD-SCNC: 3.8 MMOL/L (ref 3.4–5.3)
PROT SERPL-MCNC: 6.3 G/DL (ref 6.8–8.8)
RBC # BLD AUTO: 3.87 10E6/UL (ref 3.8–5.2)
SODIUM SERPL-SCNC: 137 MMOL/L (ref 133–144)
WBC # BLD AUTO: 4.7 10E3/UL (ref 4–11)

## 2021-07-28 PROCEDURE — 99207 PR NO CHARGE LOS: CPT

## 2021-07-28 PROCEDURE — S0028 INJECTION, FAMOTIDINE, 20 MG: HCPCS | Performed by: NURSE PRACTITIONER

## 2021-07-28 PROCEDURE — 99214 OFFICE O/P EST MOD 30 MIN: CPT | Mod: 25 | Performed by: NURSE PRACTITIONER

## 2021-07-28 PROCEDURE — 96367 TX/PROPH/DG ADDL SEQ IV INF: CPT | Performed by: NURSE PRACTITIONER

## 2021-07-28 PROCEDURE — 86300 IMMUNOASSAY TUMOR CA 15-3: CPT | Performed by: INTERNAL MEDICINE

## 2021-07-28 PROCEDURE — 80053 COMPREHEN METABOLIC PANEL: CPT | Performed by: INTERNAL MEDICINE

## 2021-07-28 PROCEDURE — 84100 ASSAY OF PHOSPHORUS: CPT | Performed by: INTERNAL MEDICINE

## 2021-07-28 PROCEDURE — 83735 ASSAY OF MAGNESIUM: CPT | Performed by: INTERNAL MEDICINE

## 2021-07-28 PROCEDURE — 96375 TX/PRO/DX INJ NEW DRUG ADDON: CPT | Performed by: NURSE PRACTITIONER

## 2021-07-28 PROCEDURE — 85025 COMPLETE CBC W/AUTO DIFF WBC: CPT | Performed by: INTERNAL MEDICINE

## 2021-07-28 PROCEDURE — 96413 CHEMO IV INFUSION 1 HR: CPT | Performed by: NURSE PRACTITIONER

## 2021-07-28 RX ORDER — HEPARIN SODIUM,PORCINE 10 UNIT/ML
5 VIAL (ML) INTRAVENOUS
Status: CANCELLED | OUTPATIENT
Start: 2021-08-04

## 2021-07-28 RX ORDER — MORPHINE SULFATE 15 MG/1
15-30 TABLET ORAL
Qty: 60 TABLET | Refills: 0 | Status: SHIPPED | OUTPATIENT
Start: 2021-07-28 | End: 2021-08-12

## 2021-07-28 RX ORDER — HEPARIN SODIUM (PORCINE) LOCK FLUSH IV SOLN 100 UNIT/ML 100 UNIT/ML
500 SOLUTION INTRAVENOUS ONCE
Status: COMPLETED | OUTPATIENT
Start: 2021-07-28 | End: 2021-07-28

## 2021-07-28 RX ORDER — MEPERIDINE HYDROCHLORIDE 25 MG/ML
25 INJECTION INTRAMUSCULAR; INTRAVENOUS; SUBCUTANEOUS EVERY 30 MIN PRN
Status: CANCELLED | OUTPATIENT
Start: 2021-07-28

## 2021-07-28 RX ORDER — ATROPINE SULFATE 0.4 MG/ML
0.4 AMPUL (ML) INJECTION
Status: CANCELLED | OUTPATIENT
Start: 2021-07-28

## 2021-07-28 RX ORDER — ACETAMINOPHEN 325 MG/1
650 TABLET ORAL ONCE
Status: CANCELLED | OUTPATIENT
Start: 2021-08-04

## 2021-07-28 RX ORDER — SODIUM CHLORIDE 9 MG/ML
1000 INJECTION, SOLUTION INTRAVENOUS CONTINUOUS PRN
Status: CANCELLED
Start: 2021-07-28

## 2021-07-28 RX ORDER — ALBUTEROL SULFATE 0.83 MG/ML
2.5 SOLUTION RESPIRATORY (INHALATION)
Status: CANCELLED | OUTPATIENT
Start: 2021-07-28

## 2021-07-28 RX ORDER — DIPHENHYDRAMINE HCL 25 MG
50 CAPSULE ORAL ONCE
Status: CANCELLED | OUTPATIENT
Start: 2021-07-28

## 2021-07-28 RX ORDER — HEPARIN SODIUM,PORCINE 10 UNIT/ML
5 VIAL (ML) INTRAVENOUS
Status: CANCELLED | OUTPATIENT
Start: 2021-07-28

## 2021-07-28 RX ORDER — METHYLPREDNISOLONE SODIUM SUCCINATE 125 MG/2ML
125 INJECTION, POWDER, LYOPHILIZED, FOR SOLUTION INTRAMUSCULAR; INTRAVENOUS
Status: CANCELLED
Start: 2021-08-04

## 2021-07-28 RX ORDER — DIPHENHYDRAMINE HYDROCHLORIDE 50 MG/ML
50 INJECTION INTRAMUSCULAR; INTRAVENOUS
Status: CANCELLED
Start: 2021-07-28

## 2021-07-28 RX ORDER — HEPARIN SODIUM (PORCINE) LOCK FLUSH IV SOLN 100 UNIT/ML 100 UNIT/ML
5 SOLUTION INTRAVENOUS
Status: CANCELLED | OUTPATIENT
Start: 2021-07-28

## 2021-07-28 RX ORDER — HEPARIN SODIUM (PORCINE) LOCK FLUSH IV SOLN 100 UNIT/ML 100 UNIT/ML
5 SOLUTION INTRAVENOUS ONCE
Status: COMPLETED | OUTPATIENT
Start: 2021-07-28 | End: 2021-07-28

## 2021-07-28 RX ORDER — ALBUTEROL SULFATE 90 UG/1
1-2 AEROSOL, METERED RESPIRATORY (INHALATION)
Status: CANCELLED
Start: 2021-08-04

## 2021-07-28 RX ORDER — ALBUTEROL SULFATE 0.83 MG/ML
2.5 SOLUTION RESPIRATORY (INHALATION)
Status: CANCELLED | OUTPATIENT
Start: 2021-08-04

## 2021-07-28 RX ORDER — ATROPINE SULFATE 0.4 MG/ML
0.4 AMPUL (ML) INJECTION
Status: CANCELLED | OUTPATIENT
Start: 2021-08-04

## 2021-07-28 RX ORDER — MEPERIDINE HYDROCHLORIDE 25 MG/ML
25 INJECTION INTRAMUSCULAR; INTRAVENOUS; SUBCUTANEOUS EVERY 30 MIN PRN
Status: CANCELLED | OUTPATIENT
Start: 2021-08-04

## 2021-07-28 RX ORDER — MORPHINE SULFATE 15 MG/1
15 TABLET, FILM COATED, EXTENDED RELEASE ORAL EVERY EVENING
Qty: 30 TABLET | Refills: 0 | Status: SHIPPED | OUTPATIENT
Start: 2021-07-28 | End: 2021-08-12

## 2021-07-28 RX ORDER — DIPHENHYDRAMINE HYDROCHLORIDE 50 MG/ML
50 INJECTION INTRAMUSCULAR; INTRAVENOUS
Status: CANCELLED
Start: 2021-08-04

## 2021-07-28 RX ORDER — LORAZEPAM 2 MG/ML
0.5 INJECTION INTRAMUSCULAR EVERY 4 HOURS PRN
Status: CANCELLED
Start: 2021-08-04

## 2021-07-28 RX ORDER — METHYLPREDNISOLONE SODIUM SUCCINATE 125 MG/2ML
125 INJECTION, POWDER, LYOPHILIZED, FOR SOLUTION INTRAMUSCULAR; INTRAVENOUS
Status: CANCELLED
Start: 2021-07-28

## 2021-07-28 RX ORDER — DIPHENHYDRAMINE HCL 25 MG
50 CAPSULE ORAL ONCE
Status: COMPLETED | OUTPATIENT
Start: 2021-07-28 | End: 2021-07-28

## 2021-07-28 RX ORDER — EPINEPHRINE 1 MG/ML
0.3 INJECTION, SOLUTION INTRAMUSCULAR; SUBCUTANEOUS EVERY 5 MIN PRN
Status: CANCELLED | OUTPATIENT
Start: 2021-07-28

## 2021-07-28 RX ORDER — ACETAMINOPHEN 325 MG/1
650 TABLET ORAL ONCE
Status: COMPLETED | OUTPATIENT
Start: 2021-07-28 | End: 2021-07-28

## 2021-07-28 RX ORDER — NALOXONE HYDROCHLORIDE 0.4 MG/ML
.1-.4 INJECTION, SOLUTION INTRAMUSCULAR; INTRAVENOUS; SUBCUTANEOUS
Status: CANCELLED | OUTPATIENT
Start: 2021-07-28

## 2021-07-28 RX ORDER — EPINEPHRINE 1 MG/ML
0.3 INJECTION, SOLUTION INTRAMUSCULAR; SUBCUTANEOUS EVERY 5 MIN PRN
Status: CANCELLED | OUTPATIENT
Start: 2021-08-04

## 2021-07-28 RX ORDER — SODIUM CHLORIDE 9 MG/ML
1000 INJECTION, SOLUTION INTRAVENOUS CONTINUOUS PRN
Status: CANCELLED
Start: 2021-08-04

## 2021-07-28 RX ORDER — DIPHENHYDRAMINE HCL 25 MG
50 CAPSULE ORAL ONCE
Status: CANCELLED | OUTPATIENT
Start: 2021-08-04

## 2021-07-28 RX ORDER — NALOXONE HYDROCHLORIDE 0.4 MG/ML
.1-.4 INJECTION, SOLUTION INTRAMUSCULAR; INTRAVENOUS; SUBCUTANEOUS
Status: CANCELLED | OUTPATIENT
Start: 2021-08-04

## 2021-07-28 RX ORDER — ALBUTEROL SULFATE 90 UG/1
1-2 AEROSOL, METERED RESPIRATORY (INHALATION)
Status: CANCELLED
Start: 2021-07-28

## 2021-07-28 RX ORDER — ACETAMINOPHEN 325 MG/1
650 TABLET ORAL ONCE
Status: CANCELLED | OUTPATIENT
Start: 2021-07-28

## 2021-07-28 RX ORDER — LORAZEPAM 2 MG/ML
0.5 INJECTION INTRAMUSCULAR EVERY 4 HOURS PRN
Status: CANCELLED
Start: 2021-07-28

## 2021-07-28 RX ORDER — HEPARIN SODIUM (PORCINE) LOCK FLUSH IV SOLN 100 UNIT/ML 100 UNIT/ML
5 SOLUTION INTRAVENOUS
Status: CANCELLED | OUTPATIENT
Start: 2021-08-04

## 2021-07-28 RX ADMIN — HEPARIN SODIUM (PORCINE) LOCK FLUSH IV SOLN 100 UNIT/ML 5 ML: 100 SOLUTION at 08:47

## 2021-07-28 RX ADMIN — Medication 500 ML: at 10:02

## 2021-07-28 RX ADMIN — Medication 50 MG: at 09:52

## 2021-07-28 RX ADMIN — ACETAMINOPHEN 650 MG: 325 TABLET ORAL at 09:52

## 2021-07-28 RX ADMIN — HEPARIN SODIUM (PORCINE) LOCK FLUSH IV SOLN 100 UNIT/ML 500 UNITS: 100 SOLUTION at 12:33

## 2021-07-28 ASSESSMENT — MIFFLIN-ST. JEOR: SCORE: 1201.84

## 2021-07-28 ASSESSMENT — PAIN SCALES - GENERAL: PAINLEVEL: NO PAIN (0)

## 2021-07-28 NOTE — LETTER
7/28/2021         RE: Shaneka Alvarez  [address] [facility] [address]        Dear Colleague,    Thank you for referring your patient, Shaneka Alvarez, to the [facility]. Please see a copy of my visit note below.    Hematology/Oncology Visit    Care Team:  - Oncologist: Dr. Zepeda  - PCP: [facility]    Reason for visit: exam prior to C13D1 sacituzumab (Trodelvy)    Diagnosis: metastatic (brain and spine) triple negative breast cancer    Cancer and Treatment Hx:  INITIAL DIAGNOSIS  Jan 2006: diagnosed with Stage IIB, T2 N1 M0 invasive lobular carcinoma of the right breast  In January 2006.  Final pathology showed a 4.5 x 3.8 x 2.5 cm, 01/14 lymph nodes positive.  ER/MO+ and HER-2 negative. Genetics- BRCA1 and 2 mutations not detected. 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   Jan 2007: Completed radiotherapy to the right breast of 5040 cGy.   March 2007-2014: Aromasin, stopped after moving [facility] the [facility] [place]  Jan 2016: bilateral mastectomy with flap reconstruction    RELAPSE  Aug 2019: MRI brain as follow-up for multiple sclerosis found multiple calvarial lesions noted suspicious for metastatic disease. No evidence of new MS lesions. PET scan showed widespread bone metastatic lesions (calvarium, spine, sacrum, pelvis, ribs most prominent at C7, T3, L1 and L4), hypermetabolic 3 cm lesion in LLL, and mediastinal/hilar LN. CEA at 1.9 and C27-29 at 415 (28 on 8/23/16). A CT guided biopsy of the right iliac bone was consistent with metastatic adenocarcinoma (breast), ER/MO negative, HER-2 negative PDL 1 < 1%. A second biopsy of the LLL nodule via EBUS on 9/5/19 was negative for malignancy.  Sept 2019: MRI C/T/L spine with numerous enhancing lesions of the spine, largest around T9 and L1. No evidence of cord compression. L1 compression fracture and  impending L4. Neurosurgery recommended no surgical intervention but to wear [place] brace when out of bed and HOB >30 degrees.  Sept 18, 2019: completed T12-L5 radiation in 10 fractions =3000 cGy  Oct 2019: Xeloda x 2 trials lowering dose to 1000 mg bid and every 3 month Zometa  Nov 2019- Jan 2020: weekly paclitaxel x 3 cycles  Jan-Oct 2020: Eribulin  Nov 2020- current: Trodelvy  Feb 25, 2021: repeat biopsy done from the right acetabulum showed metastatic lobular breast cancer. ER/MT negative, HER-2 FISH negative. Androgen receptor is diffusely positive. PD-L1 negative. Foundation 1 testing did not reveal any actionable mutations    Interval History:  Shaneka has been feeling well since her last oncology appt and has no acute symptomology today. She recently checked in with her Oncologist in [place] during a visit to see her daughter and was happy to hear their recommendations were in line with what Dr. Zepeda had suggested. They did recommend looking into clinical trials also and she is wondering about that. Eating and drinking well. Regular BMs with use of stool softener. Pain is stable and well managed with current regimen. No questions or concerns about planned treatment today.    Medications:  Current Outpatient Medications   Medication     acetaminophen (TYLENOL) 500 MG tablet     apixaban ANTICOAGULANT (ELIQUIS) 5 MG tablet     busPIRone (BUSPAR) 15 MG tablet     calcium citrate-vitamin D (CITRACAL) 315-250 MG-UNIT TABS     Cholecalciferol (VITAMIN D3 PO)     HEMP OIL OR EXTRACT OR OTHER CBD CANNABINOID, NOT MEDICAL CANNABIS,     loratadine (CLARITIN) 10 MG tablet     magnesium 250 MG tablet     OLANZapine (ZYPREXA) 5 MG tablet     promethazine (PHENERGAN) 25 MG tablet     propranolol ER (INDERAL LA) 80 MG 24 hr capsule     senna-docusate (SENOKOT-S/PERICOLACE) 8.6-50 MG tablet     traZODone (DESYREL) 50 MG tablet     venlafaxine (EFFEXOR-XR) 75 MG 24 hr capsule     LORazepam (ATIVAN) 1 MG tablet     morphine  "(MS CONTIN) 15 MG CR tablet     morphine (MSIR) 15 MG IR tablet     No current facility-administered medications for this visit.     Facility-Administered Medications Ordered in Other Visits   Medication     sacituzumab govitecan-hziy (TRODELVY) 444 mg in sodium chloride 0.9 % 319 mL infusion     Physical Exam:  GEN: pleasantly conversant female no acute distress  SKIN: generally intact, no visible rash, bruising, or sores, IVAD left chest  ENT: eyes non-icteric, no notable oral sores  LYMPH: no notable cervical or supraclavicular lymphadenopathy  RESP: clear to auscultation bilaterally, on room air  CARDS: regular rate and rhythm, no notable murmurs   GI: abd soft without notable hepatosplenomegaly, non-tender to palpation  MSK: no notable lower extremity edema  NEURO: alert and oriented without obvious focal deficit    /71 (BP Location: Left arm, Patient Position: Sitting, Cuff Size: Adult Regular)   Pulse 85   Temp 97.5  F (36.4  C) (Oral)   Resp 14   Ht 1.575 m (5' 2\")   Wt 67.4 kg (148 lb 8 oz)   SpO2 95%   BMI 27.16 kg/m       Wt Readings from Last 4 Encounters:   07/28/21 67.4 kg (148 lb 8 oz)   07/14/21 65.7 kg (144 lb 12.8 oz)   07/07/21 64.9 kg (143 lb)   07/07/21 65.1 kg (143 lb 9.6 oz)     Labs:  Results for orders placed or performed in visit on 07/28/21   Comprehensive metabolic panel     Status: Abnormal   Result Value Ref Range    Sodium 137 133 - 144 mmol/L    Potassium 3.8 3.4 - 5.3 mmol/L    Chloride 102 94 - 109 mmol/L    Carbon Dioxide (CO2) 28 20 - 32 mmol/L    Anion Gap 7 3 - 14 mmol/L    Urea Nitrogen 18 7 - 30 mg/dL    Creatinine 0.75 0.52 - 1.04 mg/dL    Calcium 8.5 8.5 - 10.1 mg/dL    Glucose 112 (H) 70 - 99 mg/dL    Alkaline Phosphatase 72 40 - 150 U/L    AST 20 0 - 45 U/L    ALT 22 0 - 50 U/L    Protein Total 6.3 (L) 6.8 - 8.8 g/dL    Albumin 3.3 (L) 3.4 - 5.0 g/dL    Bilirubin Total 0.3 0.2 - 1.3 mg/dL    GFR Estimate 88 >60 mL/min/1.73m2   Magnesium     Status: Normal "   Result Value Ref Range    Magnesium 2.0 1.6 - 2.3 mg/dL   Phosphorus     Status: Abnormal   Result Value Ref Range    Phosphorus 4.9 (H) 2.5 - 4.5 mg/dL   CBC with platelets and differential     Status: Abnormal   Result Value Ref Range    WBC Count 4.7 4.0 - 11.0 10e3/uL    RBC Count 3.87 3.80 - 5.20 10e6/uL    Hemoglobin 11.0 (L) 11.7 - 15.7 g/dL    Hematocrit 34.0 (L) 35.0 - 47.0 %    MCV 88 78 - 100 fL    MCH 28.4 26.5 - 33.0 pg    MCHC 32.4 31.5 - 36.5 g/dL    RDW 14.6 10.0 - 15.0 %    NRBCs per 100 WBC 0 <1 /100    Absolute Neutrophils 3.1 1.6 - 8.3 10e3/uL    Absolute Lymphocytes 0.6 (L) 0.8 - 5.3 10e3/uL    Absolute Monocytes 0.7 0.0 - 1.3 10e3/uL    Absolute Eosinophils 0.2 0.0 - 0.7 10e3/uL    Absolute Basophils 0.0 0.0 - 0.2 10e3/uL    Absolute Immature Granulocytes 0.0 <=0.0 10e3/uL    Absolute NRBCs 0.0 10e3/uL     Imaging:  Reviewed PET/CT whole body from 6/11/21:  IMPRESSION: In this patient with a history of metastatic breast cancer status post bilateral mastectomies and chemoradiation:  1. Mildly increased FDG uptake to multiple osseous lesions which were previously either nonavid or decreasing in uptake on 2/5/2021, concerning for reactivated metastases, less likely flare phenomenon.  2. Unchanged uptake to the right iliac/acetabular lesion which was previously increasing, concerning for ongoing active metastasis.  3. Small hiatal hernia. Ongoing uptake throughout the esophagus most consistent with esophagitis.    Assessment and Plan:  Metastatic breast cancer  Anemia secondary to chemotherapy  Metastasis to brain  - PET/CT on 6/11/2021 showed mildly increased uptake in some bone lesions with stability in other bone lesions  - CA 27-29 pending from today but has been uptrending, last 729 on 7/7  - Given current good quality of life and general slow progression of metastatic disease, decision was made to continue with Trodelvy  - Recently saw Dr. Castillo in [place] who agreed with going  forward with Todelvy and recommended a possible clinical trial once the decision is made to switch from Todelvy (after 20% growth noted on imaging) or androgen receptor blockers  - Requested research nurse look in to available clinical trials today  - Currently meeting goals and labs appropriate for cycle 13 later today    Metastasis to bone  - S/P radiation to T12-L5 in Sept 2019, at that time Ortho recommended conservative management  - Receives monthly Xgeva, next 8/11  - Continues on calcium and vitamin D supplementation    Nausea secondary to chemotherapy  - Improved significantly with use of Emend and decadron with zofran and compazine as needed     Cancer related pain  - Stable with use of morphine sulfate immediate release and MS Contin  - Followed by Palliative Medicine     Bilateral pulmonary emboli  - Incidentally found July 2020  - Continues on Eliquis     Hyperphosphatemia  - Phos mildly elevated at 4.9, no current indication for medication management, continue to monitor     Neuropathy, fingertips  - Mild, not effecting ADLs, continue to monitor.     Multiple sclerosis  - Follows with Dr. Quiles in Neurology  - Reports good control, not currently on any mediation management       Next visit: 8/17 with Dr. Zepeda  Next imaging: will be determined based on CA27-29 per Dr. Zepeda  Future Appointments   Date Time Provider Department Center   7/28/2021  8:30 AM [facility] [place]   7/28/2021  9:00 AM [facility] [place]   7/28/2021 10:00 AM BAY [facility]   8/4/2021 10:30 [facility]   8/4/2021 11:00 AM BAY 9 [facility]   8/17/2021  9:00 AM NURSE ONLY [facility]   8/17/2021  9:30 AM [facility] [place]   8/17/2021 10:00 AM BAY 11 [facility]   8/25/2021 11:00 AM NURSE ONLY [facility]   8/25/2021 11:30 AM BAY 3  INFUSION [facility] [place]   9/29/2021 11:20 AM Avla Whitaker MD [facility] [place]     The total time of this encounter amounted to 30 minutes today. This time includes face-to-face time spent with the patient, prep work, ordering tests, and performing post-visit documentation.    Shannan Schilling CNP    July 28, 2021 I have examined the patient and agree with written evaluation. Assessment and plan presented by this provider. Annie Bailon Cnp          Again, thank you for allowing me to participate in the care of your patient.        Sincerely,        Annie Bailon, TAY, APRN CNP

## 2021-07-28 NOTE — NURSING NOTE
"Oncology Rooming Note    July 28, 2021 8:56 AM   Shaneka Alvarez is a 61 year old female who presents for:    Chief Complaint   Patient presents with     Oncology Clinic Visit     Follow up     Initial Vitals: Ht 1.575 m (5' 2\")   Wt 67.4 kg (148 lb 8 oz)   BMI 27.16 kg/m   Estimated body mass index is 27.16 kg/m  as calculated from the following:    Height as of this encounter: 1.575 m (5' 2\").    Weight as of this encounter: 67.4 kg (148 lb 8 oz). Body surface area is 1.72 meters squared.  No Pain (0) Comment: Data Unavailable   No LMP recorded. Patient is postmenopausal.  Allergies reviewed: Yes  Medications reviewed: Yes    Medications: Medication refills not needed today.  Pharmacy name entered into [facility]:    [id number] - [place], [place] - [address] [id number] - [place], [place] - [address] [facility] [place] [address] [place] [address] [address]  [facility] - [place], [place] - [address] [facility] - [place], [place] - [address] [facility] [facility]  [facility] - [place], [place] - [address]    Clinical concerns: None       Rocío James CMA            "

## 2021-07-28 NOTE — PROGRESS NOTES
Hematology/Oncology Visit    Care Team:  - Oncologist: Dr. Zepeda  - PCP: [facility]    Reason for visit: exam prior to C13D1 sacituzumab (Trodelvy)    Diagnosis: metastatic (brain and spine) triple negative breast cancer    Cancer and Treatment Hx:  INITIAL DIAGNOSIS  Jan 2006: diagnosed with Stage IIB, T2 N1 M0 invasive lobular carcinoma of the right breast  In January 2006.  Final pathology showed a 4.5 x 3.8 x 2.5 cm, 01/14 lymph nodes positive.  ER/OH+ and HER-2 negative. Genetics- BRCA1 and 2 mutations not detected. 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   Jan 2007: Completed radiotherapy to the right breast of 5040 cGy.   March 2007-2014: Aromasin, stopped after moving to the [facility]  Jan 2016: bilateral mastectomy with flap reconstruction    RELAPSE  Aug 2019: MRI brain as follow-up for multiple sclerosis found multiple calvarial lesions noted suspicious for metastatic disease. No evidence of new MS lesions. PET scan showed widespread bone metastatic lesions (calvarium, spine, sacrum, pelvis, ribs most prominent at C7, T3, L1 and L4), hypermetabolic 3 cm lesion in LLL, and mediastinal/hilar LN. CEA at 1.9 and C27-29 at 415 (28 on 8/23/16). A CT guided biopsy of the right iliac bone was consistent with metastatic adenocarcinoma (breast), ER/OH negative, HER-2 negative PDL 1 < 1%. A second biopsy of the LLL nodule via EBUS on 9/5/19 was negative for malignancy.  Sept 2019: MRI C/T/L spine with numerous enhancing lesions of the spine, largest around T9 and L1. No evidence of cord compression. L1 compression fracture and impending L4. Neurosurgery recommended no surgical intervention but to wear [place] brace when out of bed and HOB >30 degrees.  Sept 18, 2019: completed T12-L5 radiation in 10 fractions =3000 cGy  Oct 2019: Xeloda x 2 trials lowering dose to 1000 mg bid and every 3  month Zometa  Nov 2019- Jan 2020: weekly paclitaxel x 3 cycles  Jan-Oct 2020: Eribulin  Nov 2020- current: Trodelvy  Feb 25, 2021: repeat biopsy done from the right acetabulum showed metastatic lobular breast cancer. ER/MA negative, HER-2 FISH negative. Androgen receptor is diffusely positive. PD-L1 negative. Foundation 1 testing did not reveal any actionable mutations    Interval History:  Shaneka has been feeling well since her last oncology appt and has no acute symptomology today. She recently checked in with her Oncologist in [place] during a visit to see her daughter and was happy to hear their recommendations were in line with what Dr. Zepeda had suggested. They did recommend looking into clinical trials also and she is wondering about that. Eating and drinking well. Regular BMs with use of stool softener. Pain is stable and well managed with current regimen. No questions or concerns about planned treatment today.    Medications:  Current Outpatient Medications   Medication     acetaminophen (TYLENOL) 500 MG tablet     apixaban ANTICOAGULANT (ELIQUIS) 5 MG tablet     busPIRone (BUSPAR) 15 MG tablet     calcium citrate-vitamin D (CITRACAL) 315-250 MG-UNIT TABS     Cholecalciferol (VITAMIN D3 PO)     HEMP OIL OR EXTRACT OR OTHER CBD CANNABINOID, NOT MEDICAL CANNABIS,     loratadine (CLARITIN) 10 MG tablet     magnesium 250 MG tablet     OLANZapine (ZYPREXA) 5 MG tablet     promethazine (PHENERGAN) 25 MG tablet     propranolol ER (INDERAL LA) 80 MG 24 hr capsule     senna-docusate (SENOKOT-S/PERICOLACE) 8.6-50 MG tablet     traZODone (DESYREL) 50 MG tablet     venlafaxine (EFFEXOR-XR) 75 MG 24 hr capsule     LORazepam (ATIVAN) 1 MG tablet     morphine (MS CONTIN) 15 MG CR tablet     morphine (MSIR) 15 MG IR tablet     No current facility-administered medications for this visit.     Facility-Administered Medications Ordered in Other Visits   Medication     sacituzumab govitecan-hziy (TRODELVY) 444 mg in sodium  "chloride 0.9 % 319 mL infusion     Physical Exam:  GEN: pleasantly conversant female no acute distress  SKIN: generally intact, no visible rash, bruising, or sores, IVAD left chest  ENT: eyes non-icteric, no notable oral sores  LYMPH: no notable cervical or supraclavicular lymphadenopathy  RESP: clear to auscultation bilaterally, on room air  CARDS: regular rate and rhythm, no notable murmurs   GI: abd soft without notable hepatosplenomegaly, non-tender to palpation  MSK: no notable lower extremity edema  NEURO: alert and oriented without obvious focal deficit    /71 (BP Location: Left arm, Patient Position: Sitting, Cuff Size: Adult Regular)   Pulse 85   Temp 97.5  F (36.4  C) (Oral)   Resp 14   Ht 1.575 m (5' 2\")   Wt 67.4 kg (148 lb 8 oz)   SpO2 95%   BMI 27.16 kg/m       Wt Readings from Last 4 Encounters:   07/28/21 67.4 kg (148 lb 8 oz)   07/14/21 65.7 kg (144 lb 12.8 oz)   07/07/21 64.9 kg (143 lb)   07/07/21 65.1 kg (143 lb 9.6 oz)     Labs:  Results for orders placed or performed in visit on 07/28/21   Comprehensive metabolic panel     Status: Abnormal   Result Value Ref Range    Sodium 137 133 - 144 mmol/L    Potassium 3.8 3.4 - 5.3 mmol/L    Chloride 102 94 - 109 mmol/L    Carbon Dioxide (CO2) 28 20 - 32 mmol/L    Anion Gap 7 3 - 14 mmol/L    Urea Nitrogen 18 7 - 30 mg/dL    Creatinine 0.75 0.52 - 1.04 mg/dL    Calcium 8.5 8.5 - 10.1 mg/dL    Glucose 112 (H) 70 - 99 mg/dL    Alkaline Phosphatase 72 40 - 150 U/L    AST 20 0 - 45 U/L    ALT 22 0 - 50 U/L    Protein Total 6.3 (L) 6.8 - 8.8 g/dL    Albumin 3.3 (L) 3.4 - 5.0 g/dL    Bilirubin Total 0.3 0.2 - 1.3 mg/dL    GFR Estimate 88 >60 mL/min/1.73m2   Magnesium     Status: Normal   Result Value Ref Range    Magnesium 2.0 1.6 - 2.3 mg/dL   Phosphorus     Status: Abnormal   Result Value Ref Range    Phosphorus 4.9 (H) 2.5 - 4.5 mg/dL   CBC with platelets and differential     Status: Abnormal   Result Value Ref Range    WBC Count 4.7 4.0 - 11.0 " 10e3/uL    RBC Count 3.87 3.80 - 5.20 10e6/uL    Hemoglobin 11.0 (L) 11.7 - 15.7 g/dL    Hematocrit 34.0 (L) 35.0 - 47.0 %    MCV 88 78 - 100 fL    MCH 28.4 26.5 - 33.0 pg    MCHC 32.4 31.5 - 36.5 g/dL    RDW 14.6 10.0 - 15.0 %    NRBCs per 100 WBC 0 <1 /100    Absolute Neutrophils 3.1 1.6 - 8.3 10e3/uL    Absolute Lymphocytes 0.6 (L) 0.8 - 5.3 10e3/uL    Absolute Monocytes 0.7 0.0 - 1.3 10e3/uL    Absolute Eosinophils 0.2 0.0 - 0.7 10e3/uL    Absolute Basophils 0.0 0.0 - 0.2 10e3/uL    Absolute Immature Granulocytes 0.0 <=0.0 10e3/uL    Absolute NRBCs 0.0 10e3/uL     Imaging:  Reviewed PET/CT whole body from 6/11/21:  IMPRESSION: In this patient with a history of metastatic breast cancer status post bilateral mastectomies and chemoradiation:  1. Mildly increased FDG uptake to multiple osseous lesions which were previously either nonavid or decreasing in uptake on 2/5/2021, concerning for reactivated metastases, less likely flare phenomenon.  2. Unchanged uptake to the right iliac/acetabular lesion which was previously increasing, concerning for ongoing active metastasis.  3. Small hiatal hernia. Ongoing uptake throughout the esophagus most consistent with esophagitis.    Assessment and Plan:  Metastatic breast cancer  Anemia secondary to chemotherapy  Metastasis to brain  - PET/CT on 6/11/2021 showed mildly increased uptake in some bone lesions with stability in other bone lesions  - CA 27-29 pending from today but has been uptrending, last 729 on 7/7  - Given current good quality of life and general slow progression of metastatic disease, decision was made to continue with Trodelvy  - Recently saw Dr. Castillo in [place] who agreed with going forward with Todelvy and recommended a possible clinical trial once the decision is made to switch from Todelvy (after 20% growth noted on imaging) or androgen receptor blockers  - Requested research nurse look in to available clinical trials today   **ADDENDUM: Per  research nurse, no trials available as of 8/2  - Currently meeting goals and labs appropriate for cycle 13 later today    Metastasis to bone  - S/P radiation to T12-L5 in Sept 2019, at that time Ortho recommended conservative management  - Receives monthly Xgeva, next 8/11  - Continues on calcium and vitamin D supplementation    Nausea secondary to chemotherapy  - Improved significantly with use of Emend and decadron with zofran and compazine as needed     Cancer related pain  - Stable with use of morphine sulfate immediate release and MS Contin  - Followed by Palliative Medicine     Bilateral pulmonary emboli  - Incidentally found July 2020  - Continues on Eliquis     Hyperphosphatemia  - Phos mildly elevated at 4.9, no current indication for medication management, continue to monitor     Neuropathy, fingertips  - Mild, not effecting ADLs, continue to monitor.     Multiple sclerosis  - Follows with Dr. Quiles in Neurology  - Reports good control, not currently on any mediation management       Next visit: 8/17 with Dr. Zepeda  Next imaging: will be determined based on CA27-29 per Dr. Zepeda  Future Appointments   Date Time Provider Department Center   7/28/2021  8:30 [facility] [place]   7/28/2021  9:00 AM Annie Bailon APRN [facility] [place]   7/28/2021 10:00 AM BAY 11 INFUSION [facility] [place]   8/4/2021 10:30 [facility] [place]   8/4/2021 11:00 AM BAY 9 INFUSION [facility] [place]   8/17/2021  9:00 [facility] [place]   8/17/2021  9:30 AM Dewayne Zepeda MD [facility] [place]   8/17/2021 10:00 AM BAY 11 INFUSION [facility] [place]   8/25/2021 11:00 AM [facility] [place]   8/25/2021 11:30 AM BAY 3 INFUSION [facility] [place]   9/29/2021 11:20 AM [facility]     The total time of this encounter amounted to 30 minutes today. This time includes face-to-face time  spent with the patient, prep work, ordering tests, and performing post-visit documentation.    Shannan Schilling CNP    July 28, 2021 I have examined the patient and agree with written evaluation. Assessment and plan presented by this provider. Annie Bailon,Cnp

## 2021-07-28 NOTE — TELEPHONE ENCOUNTER
Received [employer] message from patient requesting refill of MSER and MSIR.     Last refill: 6/18/21  Last office visit: 6/28/21  Scheduled for follow up 9/29/21     Will route request to MD for review.     Reviewed [place]  Report.

## 2021-07-28 NOTE — PROGRESS NOTES
Infusion Nursing Note:  Shaneka Alvarez presents today for C13D1 sacituzumab govitecan-hziy.    Patient seen by provider today: Yes: Annie Bailon NP   present during visit today: Not Applicable.    Note: Patient in a good mood.  States she is grateful she was able to visit her kids/grandkids in [place] for a week.      Intravenous Access:  Implanted Port.    Treatment Conditions:  Lab Results   Component Value Date    HGB 11.0 07/28/2021    HGB 12.4 07/07/2021     Lab Results   Component Value Date    WBC 4.7 07/28/2021    WBC 4.5 07/07/2021      Lab Results   Component Value Date    ANEU 2.1 07/14/2021    ANEU 3.0 07/07/2021     Lab Results   Component Value Date     07/28/2021     07/07/2021      Lab Results   Component Value Date     07/28/2021     07/07/2021                   Lab Results   Component Value Date    POTASSIUM 3.8 07/28/2021    POTASSIUM 3.9 07/07/2021           Lab Results   Component Value Date    MAG 2.0 07/28/2021    MAG 2.1 07/07/2021            Lab Results   Component Value Date    CR 0.75 07/28/2021    CR 0.69 07/07/2021                   Lab Results   Component Value Date    RAFAELA 8.5 07/28/2021    RAFAELA 9.9 07/07/2021                Lab Results   Component Value Date    BILITOTAL 0.3 07/28/2021    BILITOTAL 0.4 07/07/2021           Lab Results   Component Value Date    ALBUMIN 3.3 07/28/2021    ALBUMIN 3.3 07/07/2021                    Lab Results   Component Value Date    ALT 22 07/28/2021    ALT 33 07/07/2021           Lab Results   Component Value Date    AST 20 07/28/2021    AST 21 07/07/2021       Results reviewed, labs MET treatment parameters, ok to proceed with treatment.      Post Infusion Assessment:  Patient tolerated infusion without incident.  Patient observed for 30 minutes post Trodelvy per protocol.  Site patent and intact, free from redness, edema or discomfort.  No evidence of extravasations.  Access discontinued per protocol.        Discharge Plan:   AVS to patient via MYCHART.  Patient will return 8/4/21 for next appointment.   Patient discharged in stable condition accompanied by: self.  Departure Mode: Ambulatory.      Lauren Shaw RN

## 2021-07-30 ENCOUNTER — NURSE TRIAGE (OUTPATIENT)
Dept: FAMILY MEDICINE | Facility: OTHER | Age: 61
End: 2021-07-30

## 2021-07-30 NOTE — TELEPHONE ENCOUNTER
Sx started yesterday.   Patient has a sore throat, coughed up phlegm, and a sinus headache. Denies fever, difficult breathing, or chest pain.   Pain is rated at a 6/10. Does not feel like her nasal passage is blocked.   Denies earache.     PLAN:   Advised homecare. Also advise, due to being on chemo to go to  to have an evaluation. Patient has not tried anything at home, therefore will try homecare remedies listed below and in disposition. If symptoms do not improve will call back next week to schedule.       MAGALIS Gaffney, RN  [facility]  July 30, 2021    Cough Home Remedies:       Drink six to eight glasses of water daily.     Warm mist may help improve conditions. Breath through a warm wet washcloth placed over the mouth and nose or sit in a steam-filled bathroom for twenty to thirty minutes.     You could give 1/2 tsp mixed with 1/2 tsp of honey or corn syrup.     Drink warm lemonade, apple cider, or tea to help soothe the cough.     Avoid irritants such as being around smoke, smog, or chemicals.     Turn down the heat, open the windows, or go into cooler air to help suppress cough.     If congested avoid milk products.     Take cough suppressant (ask pharmacists for product suggestions) if cough is interfering with activity, causing chest pain, vomiting, or interrupting night sleep.     Take OTC medication as needed, being sure to follow instructions on the label.   o For a wet cough, use a decongestant; for a dry cough, use an expectorant during the day and a suppressant at night; for an allergy, use an antihistamine or decongestant.       Guideline used: Cough  Telephone Triage Protocols for Nurses, Fifth Edition, Catarina Guadalupe        Reason for Disposition    [1] Sinus congestion as part of a cold AND [2] present < 10 days    Additional Information    Negative: Severe difficulty breathing (e.g., struggling for each breath, speaks in single words)    Negative: Sounds like a  life-threatening emergency to the triager    Negative: [1] Sinus infection AND [2] taking an antibiotic AND [3] symptoms continue    Negative: [1] Difficulty breathing AND [2] not from stuffy nose (e.g., not relieved by cleaning out the nose)    Negative: [1] SEVERE headache AND [2] fever    Negative: [1] Redness or swelling on the cheek, forehead or around the eye AND [2] fever    Negative: Fever > 104 F (40 C)    Negative: Patient sounds very sick or weak to the triager    Negative: [1] SEVERE pain AND [2] not improved 2 hours after pain medicine    Negative: [1] Redness or swelling on the cheek, forehead or around the eye AND [2] no fever    Negative: [1] Fever > 101 F (38.3 C) AND [2] age > 60    Negative: [1] Fever > 100.0 F (37.8 C) AND [2] bedridden (e.g., nursing home patient, CVA, chronic illness, recovering from surgery)    Negative: [1] Fever > 100.0 F (37.8 C) AND [2] diabetes mellitus or weak immune system (e.g., HIV positive, cancer chemo, splenectomy, organ transplant, chronic steroids)    Negative: Fever present > 3 days (72 hours)    Negative: [1] Fever returns after gone for over 24 hours AND [2] symptoms worse or not improved    Negative: [1] Sinus pain (not just congestion) AND [2] fever    Negative: Earache    Negative: [1] Sinus congestion (pressure, fullness) AND [2] present > 10 days    Negative: [1] Nasal discharge AND [2] present > 10 days    Negative: [1] Using nasal washes and pain medicine > 24 hours AND [2] sinus pain (around cheekbone or eye) persists    Negative: Lots of coughing    Protocols used: SINUS PAIN OR CONGESTION-A-AH

## 2021-08-04 ENCOUNTER — LAB (OUTPATIENT)
Dept: ONCOLOGY | Facility: CLINIC | Age: 61
End: 2021-08-04
Payer: COMMERCIAL

## 2021-08-04 ENCOUNTER — INFUSION THERAPY VISIT (OUTPATIENT)
Dept: INFUSION THERAPY | Facility: CLINIC | Age: 61
End: 2021-08-04
Payer: COMMERCIAL

## 2021-08-04 VITALS
TEMPERATURE: 98.1 F | BODY MASS INDEX: 27.64 KG/M2 | HEART RATE: 72 BPM | RESPIRATION RATE: 12 BRPM | OXYGEN SATURATION: 97 % | SYSTOLIC BLOOD PRESSURE: 115 MMHG | WEIGHT: 151.1 LBS | DIASTOLIC BLOOD PRESSURE: 78 MMHG

## 2021-08-04 DIAGNOSIS — C50.911 BILATERAL MALIGNANT NEOPLASM OF BREAST IN FEMALE, UNSPECIFIED ESTROGEN RECEPTOR STATUS, UNSPECIFIED SITE OF BREAST (H): ICD-10-CM

## 2021-08-04 DIAGNOSIS — C50.919 METASTATIC BREAST CANCER: ICD-10-CM

## 2021-08-04 DIAGNOSIS — C79.51 BONE METASTASES: Primary | ICD-10-CM

## 2021-08-04 DIAGNOSIS — C50.912 BILATERAL MALIGNANT NEOPLASM OF BREAST IN FEMALE, UNSPECIFIED ESTROGEN RECEPTOR STATUS, UNSPECIFIED SITE OF BREAST (H): ICD-10-CM

## 2021-08-04 DIAGNOSIS — E87.6 HYPOKALEMIA: ICD-10-CM

## 2021-08-04 DIAGNOSIS — D70.1 CHEMOTHERAPY-INDUCED NEUTROPENIA (H): ICD-10-CM

## 2021-08-04 DIAGNOSIS — E87.6 HYPOKALEMIA: Primary | ICD-10-CM

## 2021-08-04 DIAGNOSIS — T45.1X5A CHEMOTHERAPY-INDUCED NEUTROPENIA (H): ICD-10-CM

## 2021-08-04 LAB
ALBUMIN SERPL-MCNC: 2.9 G/DL (ref 3.4–5)
ALP SERPL-CCNC: 64 U/L (ref 40–150)
ALT SERPL W P-5'-P-CCNC: 23 U/L (ref 0–50)
ANION GAP SERPL CALCULATED.3IONS-SCNC: 5 MMOL/L (ref 3–14)
AST SERPL W P-5'-P-CCNC: 19 U/L (ref 0–45)
BASOPHILS # BLD MANUAL: 0 10E3/UL (ref 0–0.2)
BASOPHILS NFR BLD MANUAL: 0 %
BILIRUB SERPL-MCNC: 0.3 MG/DL (ref 0.2–1.3)
BUN SERPL-MCNC: 14 MG/DL (ref 7–30)
CALCIUM SERPL-MCNC: 8 MG/DL (ref 8.5–10.1)
CHLORIDE BLD-SCNC: 102 MMOL/L (ref 94–109)
CO2 SERPL-SCNC: 29 MMOL/L (ref 20–32)
CREAT SERPL-MCNC: 0.7 MG/DL (ref 0.52–1.04)
EOSINOPHIL # BLD MANUAL: 0.2 10E3/UL (ref 0–0.7)
EOSINOPHIL NFR BLD MANUAL: 6 %
ERYTHROCYTE [DISTWIDTH] IN BLOOD BY AUTOMATED COUNT: 14.8 % (ref 10–15)
GFR SERPL CREATININE-BSD FRML MDRD: >90 ML/MIN/1.73M2
GLUCOSE BLD-MCNC: 108 MG/DL (ref 70–99)
HCT VFR BLD AUTO: 31.8 % (ref 35–47)
HGB BLD-MCNC: 10.8 G/DL (ref 11.7–15.7)
LYMPHOCYTES # BLD MANUAL: 0.9 10E3/UL (ref 0.8–5.3)
LYMPHOCYTES NFR BLD MANUAL: 27 %
MAGNESIUM SERPL-MCNC: 2.1 MG/DL (ref 1.6–2.3)
MCH RBC QN AUTO: 29.5 PG (ref 26.5–33)
MCHC RBC AUTO-ENTMCNC: 34 G/DL (ref 31.5–36.5)
MCV RBC AUTO: 87 FL (ref 78–100)
MONOCYTES # BLD MANUAL: 0.6 10E3/UL (ref 0–1.3)
MONOCYTES NFR BLD MANUAL: 16 %
NEUTROPHILS # BLD MANUAL: 1.8 10E3/UL (ref 1.6–8.3)
NEUTROPHILS NFR BLD MANUAL: 51 %
NRBC # BLD AUTO: 0 10E3/UL
NRBC BLD MANUAL-RTO: 1 %
PHOSPHATE SERPL-MCNC: 3.8 MG/DL (ref 2.5–4.5)
PLAT MORPH BLD: ABNORMAL
PLATELET # BLD AUTO: 157 10E3/UL (ref 150–450)
POTASSIUM BLD-SCNC: 3.3 MMOL/L (ref 3.4–5.3)
PROT SERPL-MCNC: 5.9 G/DL (ref 6.8–8.8)
RBC # BLD AUTO: 3.66 10E6/UL (ref 3.8–5.2)
RBC MORPH BLD: ABNORMAL
SODIUM SERPL-SCNC: 136 MMOL/L (ref 133–144)
VARIANT LYMPHS BLD QL SMEAR: PRESENT
WBC # BLD AUTO: 3.5 10E3/UL (ref 4–11)

## 2021-08-04 PROCEDURE — 80053 COMPREHEN METABOLIC PANEL: CPT | Performed by: INTERNAL MEDICINE

## 2021-08-04 PROCEDURE — 96367 TX/PROPH/DG ADDL SEQ IV INF: CPT | Performed by: NURSE PRACTITIONER

## 2021-08-04 PROCEDURE — 96375 TX/PRO/DX INJ NEW DRUG ADDON: CPT | Performed by: NURSE PRACTITIONER

## 2021-08-04 PROCEDURE — 99207 PR NO CHARGE NURSE ONLY: CPT

## 2021-08-04 PROCEDURE — 83735 ASSAY OF MAGNESIUM: CPT | Performed by: INTERNAL MEDICINE

## 2021-08-04 PROCEDURE — 96413 CHEMO IV INFUSION 1 HR: CPT | Performed by: NURSE PRACTITIONER

## 2021-08-04 PROCEDURE — S0028 INJECTION, FAMOTIDINE, 20 MG: HCPCS | Performed by: NURSE PRACTITIONER

## 2021-08-04 PROCEDURE — 84100 ASSAY OF PHOSPHORUS: CPT | Performed by: INTERNAL MEDICINE

## 2021-08-04 PROCEDURE — 85027 COMPLETE CBC AUTOMATED: CPT | Performed by: INTERNAL MEDICINE

## 2021-08-04 PROCEDURE — 99207 PR NO CHARGE LOS: CPT

## 2021-08-04 RX ORDER — DIPHENHYDRAMINE HCL 25 MG
50 CAPSULE ORAL ONCE
Status: COMPLETED | OUTPATIENT
Start: 2021-08-04 | End: 2021-08-04

## 2021-08-04 RX ORDER — HEPARIN SODIUM,PORCINE 10 UNIT/ML
5 VIAL (ML) INTRAVENOUS
Status: CANCELLED | OUTPATIENT
Start: 2021-08-04

## 2021-08-04 RX ORDER — HEPARIN SODIUM (PORCINE) LOCK FLUSH IV SOLN 100 UNIT/ML 100 UNIT/ML
5 SOLUTION INTRAVENOUS
Status: DISCONTINUED | OUTPATIENT
Start: 2021-08-04 | End: 2021-08-04 | Stop reason: HOSPADM

## 2021-08-04 RX ORDER — ACETAMINOPHEN 325 MG/1
650 TABLET ORAL ONCE
Status: COMPLETED | OUTPATIENT
Start: 2021-08-04 | End: 2021-08-04

## 2021-08-04 RX ORDER — HEPARIN SODIUM (PORCINE) LOCK FLUSH IV SOLN 100 UNIT/ML 100 UNIT/ML
5 SOLUTION INTRAVENOUS
Status: CANCELLED | OUTPATIENT
Start: 2021-08-04

## 2021-08-04 RX ADMIN — ACETAMINOPHEN 650 MG: 325 TABLET ORAL at 11:45

## 2021-08-04 RX ADMIN — Medication 50 MG: at 11:45

## 2021-08-04 RX ADMIN — Medication 500 ML: at 11:52

## 2021-08-04 RX ADMIN — HEPARIN SODIUM (PORCINE) LOCK FLUSH IV SOLN 100 UNIT/ML 5 ML: 100 SOLUTION at 14:39

## 2021-08-04 RX ADMIN — HEPARIN SODIUM (PORCINE) LOCK FLUSH IV SOLN 100 UNIT/ML 5 ML: 100 SOLUTION at 10:42

## 2021-08-04 NOTE — PROGRESS NOTES
Infusion Nursing Note:  Shaneka Alvarez presents today for C13D8 Dennyvy.    Patient seen by provider today: No   present during visit today: Not Applicable.    Note:   Patient states she is recovering from sinus infection, with wheezing.  Symptoms started 7/31/21. Seen in Urgent Care yesterday.  Covid test negative.  Using an inhaler with relief. Symptoms are improving.    Intravenous Access:  Implanted Port.    Treatment Conditions:  Lab Results   Component Value Date    HGB 10.8 08/04/2021    HGB 12.4 07/07/2021     Lab Results   Component Value Date    WBC 3.5 08/04/2021    WBC 4.5 07/07/2021      Lab Results   Component Value Date    ANEU 1.8 08/04/2021    ANEU 3.0 07/07/2021     Lab Results   Component Value Date     08/04/2021     07/07/2021      Lab Results   Component Value Date     08/04/2021     07/07/2021                   Lab Results   Component Value Date    POTASSIUM 3.3 08/04/2021    POTASSIUM 3.9 07/07/2021           Lab Results   Component Value Date    MAG 2.1 08/04/2021    MAG 2.1 07/07/2021            Lab Results   Component Value Date    CR 0.70 08/04/2021    CR 0.69 07/07/2021                   Lab Results   Component Value Date    RAFAELA 8.0 08/04/2021    RAFAELA 9.9 07/07/2021                Lab Results   Component Value Date    BILITOTAL 0.3 08/04/2021    BILITOTAL 0.4 07/07/2021           Lab Results   Component Value Date    ALBUMIN 2.9 08/04/2021    ALBUMIN 3.3 07/07/2021                    Lab Results   Component Value Date    ALT 23 08/04/2021    ALT 33 07/07/2021           Lab Results   Component Value Date    AST 19 08/04/2021    AST 21 07/07/2021       Results reviewed, labs MET treatment parameters, ok to proceed with treatment.      Post Infusion Assessment:  Patient tolerated infusion without incident.  Blood return noted pre and post infusion.  Site patent and intact, free from redness, edema or discomfort.  No evidence of extravasations.  Access  discontinued per protocol.       Discharge Plan:   AVS to patient via MYCHART.  Patient will return 8/17/21 for next appointment.   Patient discharged in stable condition accompanied by: self.  Departure Mode: Ambulatory.      Lauren Shaw RN

## 2021-08-12 ENCOUNTER — TELEPHONE (OUTPATIENT)
Dept: PALLIATIVE CARE | Facility: CLINIC | Age: 61
End: 2021-08-12

## 2021-08-12 DIAGNOSIS — C50.919 METASTATIC BREAST CANCER: ICD-10-CM

## 2021-08-12 DIAGNOSIS — G89.3 CANCER ASSOCIATED PAIN: ICD-10-CM

## 2021-08-12 NOTE — TELEPHONE ENCOUNTER
Pt called reporting a recent increase in pain. Pain has been well managed with once daily dosing of MS Contin and 2 doses of MSIR daily. Pt has noted over the past few days she has needed to take MSIR 30 mg every 3 hours with some but not adequate relief. She has a follow up with oncology planned next week. Discussed with Dr. Whitaker. MS Contin increased to 30 mg BID, MSIR dose to remain the same. Will plan to call pt Monday to discuss effectiveness of new pain medication regimen.    HAKAN NorthN, RN  Palliative Care Nurse Clinician  [phone] (Direct)  [phone] (Appointment Scheduling)

## 2021-08-12 NOTE — TELEPHONE ENCOUNTER
Received telephone call from patient requesting refill of MS Contin and MSIR.     Last refill: 7/28/21  Last office visit: 6/28/21  Scheduled for follow up 9/29/21     Will route request to MD for review.     Reviewed [place]  Report.

## 2021-08-13 RX ORDER — MORPHINE SULFATE 15 MG/1
30 TABLET, FILM COATED, EXTENDED RELEASE ORAL EVERY 12 HOURS
Qty: 120 TABLET | Refills: 0 | Status: SHIPPED | OUTPATIENT
Start: 2021-08-13 | End: 2021-09-20

## 2021-08-13 RX ORDER — MORPHINE SULFATE 15 MG/1
15-30 TABLET ORAL
Qty: 120 TABLET | Refills: 0 | Status: SHIPPED | OUTPATIENT
Start: 2021-08-13 | End: 2021-09-20

## 2021-08-15 DIAGNOSIS — F41.9 ANXIETY: ICD-10-CM

## 2021-08-15 DIAGNOSIS — G43.009 MIGRAINE WITHOUT AURA AND WITHOUT STATUS MIGRAINOSUS, NOT INTRACTABLE: ICD-10-CM

## 2021-08-16 ENCOUNTER — TELEPHONE (OUTPATIENT)
Dept: PALLIATIVE CARE | Facility: CLINIC | Age: 61
End: 2021-08-16

## 2021-08-16 RX ORDER — PROPRANOLOL HYDROCHLORIDE 80 MG/1
CAPSULE, EXTENDED RELEASE ORAL
Qty: 90 CAPSULE | Refills: 1 | Status: SHIPPED | OUTPATIENT
Start: 2021-08-16 | End: 2022-01-03

## 2021-08-16 RX ORDER — BUSPIRONE HYDROCHLORIDE 15 MG/1
TABLET ORAL
Qty: 180 TABLET | Refills: 1 | Status: SHIPPED | OUTPATIENT
Start: 2021-08-16 | End: 2022-04-18

## 2021-08-16 NOTE — PROGRESS NOTES
How would you like to obtain your AVS? [employer]  If the video visit is dropped, the invitation should be resent by: Text to cell phone: [phone]  Will anyone else be joining your video visit? No    Hematology/Oncology Video Visit  - Patient is at home and provider at [facility]  - Visit lasted 27 minutes    Care Team:  - Oncologist: Dr. Zepeda  - PCP: [facility]    Reason for visit: exam prior to C14D1 sacituzumab (Trodelvy)    Diagnosis: metastatic (bone) triple negative breast cancer    Cancer and Treatment Hx:  INITIAL DIAGNOSIS  Jan 2006: diagnosed with Stage IIB, T2 N1 M0 invasive lobular carcinoma of the right breast  In January 2006.  Final pathology showed a 4.5 x 3.8 x 2.5 cm, 01/14 lymph nodes positive.  ER/TX+ and HER-2 negative. Genetics- BRCA1 and 2 mutations not detected. 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   Jan 2007: Completed radiotherapy to the right breast of 5040 cGy.   March 2007-2014: Aromasin, stopped after moving [facility] the [facility]  Jan 2016: bilateral mastectomy with flap reconstruction    RELAPSE  Aug 2019: MRI brain as follow-up for multiple sclerosis found multiple calvarial lesions noted suspicious for metastatic disease. No evidence of new MS lesions. PET scan showed widespread bone metastatic lesions (calvarium, spine, sacrum, pelvis, ribs most prominent at C7, T3, L1 and L4), hypermetabolic 3 cm lesion in LLL, and mediastinal/hilar LN. CEA at 1.9 and C27-29 at 415 (28 on 8/23/16). A CT guided biopsy of the right iliac bone was consistent with metastatic adenocarcinoma (breast), ER/TX negative, HER-2 negative PDL 1 < 1%. A second biopsy of the LLL nodule via EBUS on 9/5/19 was negative for malignancy.  Sept 2019: MRI C/T/L spine with numerous enhancing lesions of the spine, largest around T9 and L1. No evidence of cord compression. L1  compression fracture and impending L4. Neurosurgery recommended no surgical intervention but to wear Gorge brace when out of bed and HOB >30 degrees.  Sept 18, 2019: completed T12-L5 radiation in 10 fractions =3000 cGy  Oct 2019: Xeloda x 2 trials lowering dose to 1000 mg bid and every 3 month Zometa  Nov 2019- Jan 2020: weekly paclitaxel x 3 cycles  Jan-Oct 2020: Eribulin  Nov 2020- current: Trodelvy  Feb 25, 2021: repeat biopsy done from the right acetabulum showed metastatic lobular breast cancer. ER/LA negative, HER-2 FISH negative. Androgen receptor is diffusely positive. PD-L1 negative. Foundation 1 testing did not reveal any actionable mutations    Interval History:  Shaneka has noticed an increase in her ongoing low back/hip pain over the past several weeks. No change in type of pain just worsening severity. She reached out to Palliative Med who recommended increasing MS contin to twice daily. Since that time she has experienced improvement in pain level and is using morphine 3x daily for breakthrough pain. She has still been able to perform all ADLs and is staying active keeping up with her 3 acre yard and gardens. She is eating and drinking well without nausea and takes MiraLAX to maintain daily BMs. No acute complaints. No questions about planned treatment today. Discussed getting CT in the next month or so to evaluate disease burden. She reports good quality of life but concerns about what the treatment future holds.    Medications:  Current Outpatient Medications   Medication     acetaminophen (TYLENOL) 500 MG tablet     apixaban ANTICOAGULANT (ELIQUIS) 5 MG tablet     busPIRone (BUSPAR) 15 MG tablet     calcium citrate-vitamin D (CITRACAL) 315-250 MG-UNIT TABS     Cholecalciferol (VITAMIN D3 PO)     HEMP OIL OR EXTRACT OR OTHER CBD CANNABINOID, NOT MEDICAL CANNABIS,     loratadine (CLARITIN) 10 MG tablet     LORazepam (ATIVAN) 1 MG tablet     magnesium 250 MG tablet     morphine (MS CONTIN) 15 MG CR  tablet     morphine (MSIR) 15 MG IR tablet     OLANZapine (ZYPREXA) 5 MG tablet     promethazine (PHENERGAN) 25 MG tablet     propranolol ER (INDERAL LA) 80 MG 24 hr capsule     senna-docusate (SENOKOT-S/PERICOLACE) 8.6-50 MG tablet     traZODone (DESYREL) 50 MG tablet     venlafaxine (EFFEXOR-XR) 75 MG 24 hr capsule     No current facility-administered medications for this visit.     Physical Exam:  GEN: pleasantly conversant female no acute distress  SKIN: generally intact, no visible rash, bruising, or sores   ENT: eyes non-icteric, EOMI  RESP: breathing easily on room air, no cough  MSK: appears to have normal range of motion based on visualized movements  NEURO:  no notable tremors, facial movements symmetric, alert and oriented without obvious focal deficit    The rest of a comprehensive physical examination is deferred due to PHE (public health emergency) video restrictions  There were no vitals taken for this visit.     Wt Readings from Last 4 Encounters:   08/04/21 68.5 kg (151 lb 1.6 oz)   07/28/21 67.4 kg (148 lb 8 oz)   07/14/21 65.7 kg (144 lb 12.8 oz)   07/07/21 64.9 kg (143 lb)     Labs:  No results found for any visits on 08/17/21. Will be drawn prior to infusion.    Imaging:  Reviewed PET/CT whole body from 6/11/21:  IMPRESSION: In this patient with a history of metastatic breast cancer status post bilateral mastectomies and chemoradiation:  1. Mildly increased FDG uptake to multiple osseous lesions which were previously either nonavid or decreasing in uptake on 2/5/2021, concerning for reactivated metastases, less likely flare phenomenon.  2. Unchanged uptake to the right iliac/acetabular lesion which was previously increasing, concerning for ongoing active metastasis.  3. Small hiatal hernia. Ongoing uptake throughout the esophagus most consistent with esophagitis.    Assessment and Plan:  Metastatic breast cancer  Anemia secondary to chemotherapy  - PET/CT on 6/11/2021 showed mildly increased  uptake in some bone lesions with stability in other bone lesions  - CA 27-29 pending from today but has been steadily uptrending   **ADDENDUM: CA27-29 came back decreased at 606 (from 801)  - Given current good quality of life and general slow progression of metastatic disease, decision was made to continue with Trodelvy  - Recently saw Dr. Castillo in [place] who agreed with going forward with Todelvy and recommended a possible clinical trial once the decision is made to switch from Todelvy (after 20% growth noted on imaging) or androgen receptor blockers  - No clinical trials available here as of 8/2 per research nurse  - Currently meeting goals, if labs appropriate then ok for cycle 14 later today  - Follow up prior to each cycle  - Will plan repeat PET/CT prior to cycle 16 with Dr. Zepeda follow up afterwards    Metastasis to bone  - S/P radiation to T12-L5 in Sept 2019, at that time Ortho recommended conservative management  - Receives monthly Xgeva, will receive today (last 7/14)  - Continues on calcium and vitamin D supplementation    Nausea secondary to chemotherapy  - Improved significantly with use of Emend and decadron with zofran premed and compazine as needed     Cancer related pain  - Stable with use of morphine sulfate immediate release and MS Contin  - Followed by Palliative Medicine, next appt 9/29     Bilateral pulmonary emboli  - Incidentally found July 2020, continues on Eliquis     Hyperphosphatemia  - Phos intermittently elevated but fluctuates without medication management, continue to monitor     Neuropathy, fingertips  - Mild, not effecting ADLs, continue to monitor    Multiple sclerosis  - Follows with Dr. Quiles in Neurology  - Reports good control, not currently on any mediation management       Future Appointments   Date Time Provider Department Center   8/17/2021  8:00 AM Shannan Schilling APRN CNP [facility]   8/17/2021  9:30 AM NURSE ONLY CANCER CENTER [facility] [place]    8/17/2021 10:00 AM BAY 11 INFUSION [facility] [place]   8/25/2021 11:00 AM NURSE ONLY CANCER CENTER [facility]   8/25/2021 11:30 [facility]   9/3/2021  3:00 PM Shannan Schilling APRN CNP [facility]   9/8/2021 10:45 [facility]   9/8/2021 11:30 AM [facility]AY [facility]   9/14/2021  9:30 AM NURSE ONLY CANCER [facility]   9/14/2021 10:00 AM BAY [facility] [place]   9/29/2021 11:20 AM Alva Whitaker MD [facility]     The total time of this encounter amounted to 30 minutes today. This time includes video time spent with the patient, prep work, ordering tests, and performing post-visit documentation.    Shannan Schilling, JONATHAN

## 2021-08-16 NOTE — TELEPHONE ENCOUNTER
Phone call placed to pt to follow up on effectiveness of adding in increased dose of MS Contin. She reports she is doing so much better, feels pain is well controlled. Discussed recommendation for bowel meds; senna and miralax. She will purchase miralax today.    Next visit with Dr. Whitaker planned 9/29, pt will call or send a "[employer] message before then with any questions/concerns.    MAGALIS North, RN  Palliative Care Nurse Clinician  [phone] (Direct)  [phone] (Appointment Scheduling)

## 2021-08-17 ENCOUNTER — LAB (OUTPATIENT)
Dept: ONCOLOGY | Facility: CLINIC | Age: 61
End: 2021-08-17
Payer: COMMERCIAL

## 2021-08-17 ENCOUNTER — VIRTUAL VISIT (OUTPATIENT)
Dept: ONCOLOGY | Facility: CLINIC | Age: 61
End: 2021-08-17
Attending: INTERNAL MEDICINE
Payer: COMMERCIAL

## 2021-08-17 ENCOUNTER — INFUSION THERAPY VISIT (OUTPATIENT)
Dept: INFUSION THERAPY | Facility: CLINIC | Age: 61
End: 2021-08-17
Payer: COMMERCIAL

## 2021-08-17 VITALS
BODY MASS INDEX: 26.7 KG/M2 | HEART RATE: 83 BPM | TEMPERATURE: 99 F | WEIGHT: 146 LBS | OXYGEN SATURATION: 100 % | DIASTOLIC BLOOD PRESSURE: 84 MMHG | SYSTOLIC BLOOD PRESSURE: 122 MMHG | RESPIRATION RATE: 16 BRPM

## 2021-08-17 DIAGNOSIS — C50.911 BILATERAL MALIGNANT NEOPLASM OF BREAST IN FEMALE, UNSPECIFIED ESTROGEN RECEPTOR STATUS, UNSPECIFIED SITE OF BREAST (H): ICD-10-CM

## 2021-08-17 DIAGNOSIS — D70.1 CHEMOTHERAPY-INDUCED NEUTROPENIA (H): ICD-10-CM

## 2021-08-17 DIAGNOSIS — C50.919 METASTATIC BREAST CANCER: ICD-10-CM

## 2021-08-17 DIAGNOSIS — C50.912 BILATERAL MALIGNANT NEOPLASM OF BREAST IN FEMALE, UNSPECIFIED ESTROGEN RECEPTOR STATUS, UNSPECIFIED SITE OF BREAST (H): Primary | ICD-10-CM

## 2021-08-17 DIAGNOSIS — C79.51 BONE METASTASES: ICD-10-CM

## 2021-08-17 DIAGNOSIS — C50.912 BILATERAL MALIGNANT NEOPLASM OF BREAST IN FEMALE, UNSPECIFIED ESTROGEN RECEPTOR STATUS, UNSPECIFIED SITE OF BREAST (H): ICD-10-CM

## 2021-08-17 DIAGNOSIS — T45.1X5A CHEMOTHERAPY-INDUCED NEUTROPENIA (H): ICD-10-CM

## 2021-08-17 DIAGNOSIS — C50.911 BILATERAL MALIGNANT NEOPLASM OF BREAST IN FEMALE, UNSPECIFIED ESTROGEN RECEPTOR STATUS, UNSPECIFIED SITE OF BREAST (H): Primary | ICD-10-CM

## 2021-08-17 DIAGNOSIS — I26.99 PULMONARY EMBOLISM WITHOUT ACUTE COR PULMONALE, UNSPECIFIED CHRONICITY, UNSPECIFIED PULMONARY EMBOLISM TYPE (H): ICD-10-CM

## 2021-08-17 DIAGNOSIS — T45.1X5A CHEMOTHERAPY-INDUCED NEUTROPENIA (H): Primary | ICD-10-CM

## 2021-08-17 DIAGNOSIS — G89.3 CANCER ASSOCIATED PAIN: ICD-10-CM

## 2021-08-17 DIAGNOSIS — E87.6 HYPOKALEMIA: ICD-10-CM

## 2021-08-17 DIAGNOSIS — D70.1 CHEMOTHERAPY-INDUCED NEUTROPENIA (H): Primary | ICD-10-CM

## 2021-08-17 DIAGNOSIS — G35 MULTIPLE SCLEROSIS (H): ICD-10-CM

## 2021-08-17 LAB
ALBUMIN SERPL-MCNC: 3.1 G/DL (ref 3.4–5)
ALP SERPL-CCNC: 64 U/L (ref 40–150)
ALT SERPL W P-5'-P-CCNC: 20 U/L (ref 0–50)
ANION GAP SERPL CALCULATED.3IONS-SCNC: 4 MMOL/L (ref 3–14)
AST SERPL W P-5'-P-CCNC: 16 U/L (ref 0–45)
BASOPHILS # BLD MANUAL: 0 10E3/UL (ref 0–0.2)
BASOPHILS NFR BLD MANUAL: 0 %
BILIRUB SERPL-MCNC: 0.3 MG/DL (ref 0.2–1.3)
BUN SERPL-MCNC: 6 MG/DL (ref 7–30)
CALCIUM SERPL-MCNC: 8.2 MG/DL (ref 8.5–10.1)
CANCER AG27-29 SERPL-ACNC: 606 U/ML (ref 0–39)
CHLORIDE BLD-SCNC: 106 MMOL/L (ref 94–109)
CO2 SERPL-SCNC: 28 MMOL/L (ref 20–32)
CREAT SERPL-MCNC: 0.68 MG/DL (ref 0.52–1.04)
EOSINOPHIL # BLD MANUAL: 0.1 10E3/UL (ref 0–0.7)
EOSINOPHIL NFR BLD MANUAL: 3 %
ERYTHROCYTE [DISTWIDTH] IN BLOOD BY AUTOMATED COUNT: 14.6 % (ref 10–15)
GFR SERPL CREATININE-BSD FRML MDRD: >90 ML/MIN/1.73M2
GLUCOSE BLD-MCNC: 110 MG/DL (ref 70–99)
HCT VFR BLD AUTO: 33.3 % (ref 35–47)
HGB BLD-MCNC: 10.9 G/DL (ref 11.7–15.7)
LYMPHOCYTES # BLD MANUAL: 0.5 10E3/UL (ref 0.8–5.3)
LYMPHOCYTES NFR BLD MANUAL: 15 %
MAGNESIUM SERPL-MCNC: 2.3 MG/DL (ref 1.6–2.3)
MCH RBC QN AUTO: 28.7 PG (ref 26.5–33)
MCHC RBC AUTO-ENTMCNC: 32.7 G/DL (ref 31.5–36.5)
MCV RBC AUTO: 88 FL (ref 78–100)
METAMYELOCYTES # BLD MANUAL: 0 10E3/UL
METAMYELOCYTES NFR BLD MANUAL: 1 %
MONOCYTES # BLD MANUAL: 0.4 10E3/UL (ref 0–1.3)
MONOCYTES NFR BLD MANUAL: 11 %
NEUTROPHILS # BLD MANUAL: 2.5 10E3/UL (ref 1.6–8.3)
NEUTROPHILS NFR BLD MANUAL: 70 %
PHOSPHATE SERPL-MCNC: 2.4 MG/DL (ref 2.5–4.5)
PLAT MORPH BLD: ABNORMAL
PLATELET # BLD AUTO: 226 10E3/UL (ref 150–450)
POTASSIUM BLD-SCNC: 3.9 MMOL/L (ref 3.4–5.3)
PROT SERPL-MCNC: 6.3 G/DL (ref 6.8–8.8)
RBC # BLD AUTO: 3.8 10E6/UL (ref 3.8–5.2)
RBC MORPH BLD: ABNORMAL
SODIUM SERPL-SCNC: 138 MMOL/L (ref 133–144)
WBC # BLD AUTO: 3.5 10E3/UL (ref 4–11)

## 2021-08-17 PROCEDURE — 99214 OFFICE O/P EST MOD 30 MIN: CPT | Mod: GT | Performed by: NURSE PRACTITIONER

## 2021-08-17 PROCEDURE — 96413 CHEMO IV INFUSION 1 HR: CPT | Performed by: NURSE PRACTITIONER

## 2021-08-17 PROCEDURE — 96375 TX/PRO/DX INJ NEW DRUG ADDON: CPT | Performed by: NURSE PRACTITIONER

## 2021-08-17 PROCEDURE — 96367 TX/PROPH/DG ADDL SEQ IV INF: CPT | Performed by: NURSE PRACTITIONER

## 2021-08-17 PROCEDURE — 83735 ASSAY OF MAGNESIUM: CPT | Performed by: INTERNAL MEDICINE

## 2021-08-17 PROCEDURE — 96372 THER/PROPH/DIAG INJ SC/IM: CPT | Mod: 59 | Performed by: NURSE PRACTITIONER

## 2021-08-17 PROCEDURE — 86300 IMMUNOASSAY TUMOR CA 15-3: CPT | Performed by: INTERNAL MEDICINE

## 2021-08-17 PROCEDURE — 99207 PR NO CHARGE LOS: CPT

## 2021-08-17 PROCEDURE — 84100 ASSAY OF PHOSPHORUS: CPT | Performed by: INTERNAL MEDICINE

## 2021-08-17 PROCEDURE — 85027 COMPLETE CBC AUTOMATED: CPT | Performed by: INTERNAL MEDICINE

## 2021-08-17 PROCEDURE — S0028 INJECTION, FAMOTIDINE, 20 MG: HCPCS | Performed by: NURSE PRACTITIONER

## 2021-08-17 PROCEDURE — 80053 COMPREHEN METABOLIC PANEL: CPT | Performed by: INTERNAL MEDICINE

## 2021-08-17 RX ORDER — DIPHENHYDRAMINE HYDROCHLORIDE 50 MG/ML
50 INJECTION INTRAMUSCULAR; INTRAVENOUS
Status: CANCELLED
Start: 2021-08-25

## 2021-08-17 RX ORDER — ATROPINE SULFATE 0.4 MG/ML
0.4 AMPUL (ML) INJECTION
Status: CANCELLED | OUTPATIENT
Start: 2021-08-17

## 2021-08-17 RX ORDER — DIPHENHYDRAMINE HCL 25 MG
50 CAPSULE ORAL ONCE
Status: CANCELLED | OUTPATIENT
Start: 2021-08-17

## 2021-08-17 RX ORDER — DIPHENHYDRAMINE HCL 25 MG
50 CAPSULE ORAL ONCE
Status: COMPLETED | OUTPATIENT
Start: 2021-08-17 | End: 2021-08-17

## 2021-08-17 RX ORDER — ALBUTEROL SULFATE 0.83 MG/ML
2.5 SOLUTION RESPIRATORY (INHALATION)
Status: CANCELLED | OUTPATIENT
Start: 2021-08-17

## 2021-08-17 RX ORDER — ATROPINE SULFATE 0.4 MG/ML
0.4 AMPUL (ML) INJECTION
Status: CANCELLED | OUTPATIENT
Start: 2021-08-25

## 2021-08-17 RX ORDER — EPINEPHRINE 1 MG/ML
0.3 INJECTION, SOLUTION, CONCENTRATE INTRAVENOUS EVERY 5 MIN PRN
Status: CANCELLED | OUTPATIENT
Start: 2021-08-25

## 2021-08-17 RX ORDER — ALBUTEROL SULFATE 90 UG/1
1-2 AEROSOL, METERED RESPIRATORY (INHALATION)
Status: CANCELLED
Start: 2021-08-17

## 2021-08-17 RX ORDER — HEPARIN SODIUM (PORCINE) LOCK FLUSH IV SOLN 100 UNIT/ML 100 UNIT/ML
5 SOLUTION INTRAVENOUS EVERY 8 HOURS
Status: DISCONTINUED | OUTPATIENT
Start: 2021-08-17 | End: 2021-08-17 | Stop reason: HOSPADM

## 2021-08-17 RX ORDER — LORAZEPAM 2 MG/ML
0.5 INJECTION INTRAMUSCULAR EVERY 4 HOURS PRN
Status: CANCELLED
Start: 2021-08-17

## 2021-08-17 RX ORDER — HEPARIN SODIUM (PORCINE) LOCK FLUSH IV SOLN 100 UNIT/ML 100 UNIT/ML
5 SOLUTION INTRAVENOUS
Status: CANCELLED | OUTPATIENT
Start: 2021-08-17

## 2021-08-17 RX ORDER — MEPERIDINE HYDROCHLORIDE 25 MG/ML
25 INJECTION INTRAMUSCULAR; INTRAVENOUS; SUBCUTANEOUS EVERY 30 MIN PRN
Status: CANCELLED | OUTPATIENT
Start: 2021-08-25

## 2021-08-17 RX ORDER — EPINEPHRINE 1 MG/ML
0.3 INJECTION, SOLUTION, CONCENTRATE INTRAVENOUS EVERY 5 MIN PRN
Status: CANCELLED | OUTPATIENT
Start: 2021-08-17

## 2021-08-17 RX ORDER — HEPARIN SODIUM,PORCINE 10 UNIT/ML
5 VIAL (ML) INTRAVENOUS
Status: CANCELLED | OUTPATIENT
Start: 2021-08-17

## 2021-08-17 RX ORDER — ALBUTEROL SULFATE 90 UG/1
1-2 AEROSOL, METERED RESPIRATORY (INHALATION)
Status: CANCELLED
Start: 2021-08-25

## 2021-08-17 RX ORDER — ACETAMINOPHEN 325 MG/1
650 TABLET ORAL ONCE
Status: CANCELLED | OUTPATIENT
Start: 2021-08-25

## 2021-08-17 RX ORDER — METHYLPREDNISOLONE SODIUM SUCCINATE 125 MG/2ML
125 INJECTION, POWDER, LYOPHILIZED, FOR SOLUTION INTRAMUSCULAR; INTRAVENOUS
Status: CANCELLED
Start: 2021-08-17

## 2021-08-17 RX ORDER — HEPARIN SODIUM (PORCINE) LOCK FLUSH IV SOLN 100 UNIT/ML 100 UNIT/ML
5 SOLUTION INTRAVENOUS
Status: DISCONTINUED | OUTPATIENT
Start: 2021-08-17 | End: 2021-08-17 | Stop reason: HOSPADM

## 2021-08-17 RX ORDER — HEPARIN SODIUM (PORCINE) LOCK FLUSH IV SOLN 100 UNIT/ML 100 UNIT/ML
5 SOLUTION INTRAVENOUS
Status: CANCELLED | OUTPATIENT
Start: 2021-08-25

## 2021-08-17 RX ORDER — ALBUTEROL SULFATE 0.83 MG/ML
2.5 SOLUTION RESPIRATORY (INHALATION)
Status: CANCELLED | OUTPATIENT
Start: 2021-08-25

## 2021-08-17 RX ORDER — ACETAMINOPHEN 325 MG/1
650 TABLET ORAL ONCE
Status: COMPLETED | OUTPATIENT
Start: 2021-08-17 | End: 2021-08-17

## 2021-08-17 RX ORDER — ACETAMINOPHEN 325 MG/1
650 TABLET ORAL ONCE
Status: CANCELLED | OUTPATIENT
Start: 2021-08-17

## 2021-08-17 RX ORDER — HEPARIN SODIUM,PORCINE 10 UNIT/ML
5 VIAL (ML) INTRAVENOUS
Status: CANCELLED | OUTPATIENT
Start: 2021-08-25

## 2021-08-17 RX ORDER — METHYLPREDNISOLONE SODIUM SUCCINATE 125 MG/2ML
125 INJECTION, POWDER, LYOPHILIZED, FOR SOLUTION INTRAMUSCULAR; INTRAVENOUS
Status: CANCELLED
Start: 2021-08-25

## 2021-08-17 RX ORDER — NALOXONE HYDROCHLORIDE 0.4 MG/ML
.1-.4 INJECTION, SOLUTION INTRAMUSCULAR; INTRAVENOUS; SUBCUTANEOUS
Status: CANCELLED | OUTPATIENT
Start: 2021-08-17

## 2021-08-17 RX ORDER — MEPERIDINE HYDROCHLORIDE 25 MG/ML
25 INJECTION INTRAMUSCULAR; INTRAVENOUS; SUBCUTANEOUS EVERY 30 MIN PRN
Status: CANCELLED | OUTPATIENT
Start: 2021-08-17

## 2021-08-17 RX ORDER — ALBUTEROL SULFATE 90 UG/1
2 AEROSOL, METERED RESPIRATORY (INHALATION)
COMMUNITY
Start: 2021-08-03 | End: 2022-04-14

## 2021-08-17 RX ORDER — SODIUM CHLORIDE 9 MG/ML
1000 INJECTION, SOLUTION INTRAVENOUS CONTINUOUS PRN
Status: CANCELLED
Start: 2021-08-25

## 2021-08-17 RX ORDER — SODIUM CHLORIDE 9 MG/ML
1000 INJECTION, SOLUTION INTRAVENOUS CONTINUOUS PRN
Status: CANCELLED
Start: 2021-08-17

## 2021-08-17 RX ORDER — NALOXONE HYDROCHLORIDE 0.4 MG/ML
.1-.4 INJECTION, SOLUTION INTRAMUSCULAR; INTRAVENOUS; SUBCUTANEOUS
Status: CANCELLED | OUTPATIENT
Start: 2021-08-25

## 2021-08-17 RX ORDER — DIPHENHYDRAMINE HYDROCHLORIDE 50 MG/ML
50 INJECTION INTRAMUSCULAR; INTRAVENOUS
Status: CANCELLED
Start: 2021-08-17

## 2021-08-17 RX ORDER — LORAZEPAM 2 MG/ML
0.5 INJECTION INTRAMUSCULAR EVERY 4 HOURS PRN
Status: CANCELLED
Start: 2021-08-25

## 2021-08-17 RX ORDER — DIPHENHYDRAMINE HCL 25 MG
50 CAPSULE ORAL ONCE
Status: CANCELLED | OUTPATIENT
Start: 2021-08-25

## 2021-08-17 RX ADMIN — HEPARIN SODIUM (PORCINE) LOCK FLUSH IV SOLN 100 UNIT/ML 5 ML: 100 SOLUTION at 13:09

## 2021-08-17 RX ADMIN — Medication 500 ML: at 10:45

## 2021-08-17 RX ADMIN — Medication 50 MG: at 10:38

## 2021-08-17 RX ADMIN — HEPARIN SODIUM (PORCINE) LOCK FLUSH IV SOLN 100 UNIT/ML 5 ML: 100 SOLUTION at 09:52

## 2021-08-17 RX ADMIN — ACETAMINOPHEN 650 MG: 325 TABLET ORAL at 10:38

## 2021-08-17 ASSESSMENT — PAIN SCALES - GENERAL: PAINLEVEL: NO PAIN (1)

## 2021-08-17 NOTE — PROGRESS NOTES
Infusion Nursing Note:  Shaneka Alvarez presents today for Trodelvy/ Xgeva.    Patient seen by provider today: No   present during visit today: Not Applicable.    Note: N/A.    Intravenous Access:  Implanted Port.    Treatment Conditions:  Lab Results   Component Value Date    HGB 10.9 08/17/2021    HGB 12.4 07/07/2021     Lab Results   Component Value Date    WBC 3.5 08/17/2021    WBC 4.5 07/07/2021      Lab Results   Component Value Date    ANEU 2.5 08/17/2021    ANEU 3.0 07/07/2021     Lab Results   Component Value Date     08/17/2021     07/07/2021      Lab Results   Component Value Date     08/17/2021     07/07/2021                   Lab Results   Component Value Date    POTASSIUM 3.9 08/17/2021    POTASSIUM 3.9 07/07/2021           Lab Results   Component Value Date    MAG 2.3 08/17/2021    MAG 2.1 07/07/2021            Lab Results   Component Value Date    CR 0.68 08/17/2021    CR 0.69 07/07/2021                   Lab Results   Component Value Date    RAFAELA 8.2 08/17/2021    RAFAELA 9.9 07/07/2021                Lab Results   Component Value Date    BILITOTAL 0.3 08/17/2021    BILITOTAL 0.4 07/07/2021           Lab Results   Component Value Date    ALBUMIN 3.1 08/17/2021    ALBUMIN 3.3 07/07/2021                    Lab Results   Component Value Date    ALT 20 08/17/2021    ALT 33 07/07/2021           Lab Results   Component Value Date    AST 16 08/17/2021    AST 21 07/07/2021           Post Infusion Assessment:  Patient tolerated infusion without incident.  Patient tolerated injection without incident.  Site patent and intact, free from redness, edema or discomfort.  No evidence of extravasations.  Access discontinued per protocol.       Discharge Plan:   Patient will return 8/25 for next appointment.   Patient discharged in stable condition accompanied by: self.  Departure Mode: Ambulatory.      Cristina Loaiza RN

## 2021-08-17 NOTE — LETTER
8/17/2021         RE: Shaneka Alvarez  [address] [facility] [address]      How would you like to obtain your AVS? MyChart  If the video visit is dropped, the invitation should be resent by: Text to cell phone: [phone]  Will anyone else be joining your video visit? No    Hematology/Oncology Video Visit  - Patient is at home and provider at [facility]  - Visit lasted 27 minutes    Care Team:  - Oncologist: Dr. Zepeda  - PCP: [facility]    Reason for visit: exam prior to C14D1 sacituzumab (Trodelvy)    Diagnosis: metastatic (brain and spine) triple negative breast cancer    Cancer and Treatment Hx:  INITIAL DIAGNOSIS  Jan 2006: diagnosed with Stage IIB, T2 N1 M0 invasive lobular carcinoma of the right breast  In January 2006.  Final pathology showed a 4.5 x 3.8 x 2.5 cm, 01/14 lymph nodes positive.  ER/OR+ and HER-2 negative. Genetics- BRCA1 and 2 mutations not detected. 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   Jan 2007: Completed radiotherapy to the right breast of 5040 cGy.   March 2007-2014: Aromasin, stopped after moving to the [facility]  Jan 2016: bilateral mastectomy with flap reconstruction    RELAPSE  Aug 2019: MRI brain as follow-up for multiple sclerosis found multiple calvarial lesions noted suspicious for metastatic disease. No evidence of new MS lesions. PET scan showed widespread bone metastatic lesions (calvarium, spine, sacrum, pelvis, ribs most prominent at C7, T3, L1 and L4), hypermetabolic 3 cm lesion in LLL, and mediastinal/hilar LN. CEA at 1.9 and C27-29 at 415 (28 on 8/23/16). A CT guided biopsy of the right iliac bone was consistent with metastatic adenocarcinoma (breast), ER/OR negative, HER-2 negative PDL 1 < 1%. A second biopsy of the LLL nodule via EBUS on 9/5/19 was negative for malignancy.  Sept 2019: MRI C/T/L spine with numerous  enhancing lesions of the spine, largest around T9 and L1. No evidence of cord compression. L1 compression fracture and impending L4. Neurosurgery recommended no surgical intervention but to wear [place] brace when out of bed and HOB >30 degrees.  Sept 18, 2019: completed T12-L5 radiation in 10 fractions =3000 cGy  Oct 2019: Xeloda x 2 trials lowering dose to 1000 mg bid and every 3 month Zometa  Nov 2019- Jan 2020: weekly paclitaxel x 3 cycles  Jan-Oct 2020: Eribulin  Nov 2020- current: Trodelvy  Feb 25, 2021: repeat biopsy done from the right acetabulum showed metastatic lobular breast cancer. ER/VT negative, HER-2 FISH negative. Androgen receptor is diffusely positive. PD-L1 negative. Foundation 1 testing did not reveal any actionable mutations    Interval History:  Shaneka has noticed an increase in her ongoing low back/hip pain over the past several weeks. No change in type of pain just worsening severity. She reached out to Palliative Med who recommended increasing MS contin to twice daily. Since that time she has experienced improvement in pain level and is using morphine 3x daily for breakthrough pain. She has still been able to perform all ADLs and is staying active keeping up with her 3 acre yard and gardens. She is eating and drinking well without nausea and takes MiraLAX to maintain daily BMs. No acute complaints. No questions about planned treatment today. Discussed getting CT in the next month or so to evaluate disease burden. She reports good quality of life but concerns about what the treatment future holds.    Medications:  Current Outpatient Medications   Medication     acetaminophen (TYLENOL) 500 MG tablet     apixaban ANTICOAGULANT (ELIQUIS) 5 MG tablet     busPIRone (BUSPAR) 15 MG tablet     calcium citrate-vitamin D (CITRACAL) 315-250 MG-UNIT TABS     Cholecalciferol (VITAMIN D3 PO)     HEMP OIL OR EXTRACT OR OTHER CBD CANNABINOID, NOT MEDICAL CANNABIS,     loratadine (CLARITIN) 10 MG tablet      LORazepam (ATIVAN) 1 MG tablet     magnesium 250 MG tablet     morphine (MS CONTIN) 15 MG CR tablet     morphine (MSIR) 15 MG IR tablet     OLANZapine (ZYPREXA) 5 MG tablet     promethazine (PHENERGAN) 25 MG tablet     propranolol ER (INDERAL LA) 80 MG 24 hr capsule     senna-docusate (SENOKOT-S/PERICOLACE) 8.6-50 MG tablet     traZODone (DESYREL) 50 MG tablet     venlafaxine (EFFEXOR-XR) 75 MG 24 hr capsule     No current facility-administered medications for this visit.     Physical Exam:  GEN: pleasantly conversant female no acute distress  SKIN: generally intact, no visible rash, bruising, or sores   ENT: eyes non-icteric, EOMI  RESP: breathing easily on room air, no cough  MSK: appears to have normal range of motion based on visualized movements  NEURO:  no notable tremors, facial movements symmetric, alert and oriented without obvious focal deficit    The rest of a comprehensive physical examination is deferred due to PHE (public health emergency) video restrictions  There were no vitals taken for this visit.     Wt Readings from Last 4 Encounters:   08/04/21 68.5 kg (151 lb 1.6 oz)   07/28/21 67.4 kg (148 lb 8 oz)   07/14/21 65.7 kg (144 lb 12.8 oz)   07/07/21 64.9 kg (143 lb)     Labs:  No results found for any visits on 08/17/21. Will be drawn prior to infusion.    Imaging:  Reviewed PET/CT whole body from 6/11/21:  IMPRESSION: In this patient with a history of metastatic breast cancer status post bilateral mastectomies and chemoradiation:  1. Mildly increased FDG uptake to multiple osseous lesions which were previously either nonavid or decreasing in uptake on 2/5/2021, concerning for reactivated metastases, less likely flare phenomenon.  2. Unchanged uptake to the right iliac/acetabular lesion which was previously increasing, concerning for ongoing active metastasis.  3. Small hiatal hernia. Ongoing uptake throughout the esophagus most consistent with esophagitis.    Assessment and Plan:  Metastatic  breast cancer  Anemia secondary to chemotherapy  Metastasis to brain  - PET/CT on 6/11/2021 showed mildly increased uptake in some bone lesions with stability in other bone lesions  - CA 27-29 pending from today but has been steadily uptrending  - Given current good quality of life and general slow progression of metastatic disease, decision was made to continue with Trodelvy  - Recently saw Dr. Castillo in [place] who agreed with going forward with Todelvy and recommended a possible clinical trial once the decision is made to switch from Todelvy (after 20% growth noted on imaging) or androgen receptor blockers  - No clinical trials available here as of 8/2 per research nurse  - Currently meeting goals, if labs appropriate then ok for cycle 14 later today  - Follow up prior to each cycle, repeat PET/CT prior to next cycle given worsening pain and continuous rise in tumor marker    Metastasis to bone  - S/P radiation to T12-L5 in Sept 2019, at that time Ortho recommended conservative management  - Receives monthly Xgeva, will receive today (last 7/14)  - Continues on calcium and vitamin D supplementation    Nausea secondary to chemotherapy  - Improved significantly with use of Emend and decadron with zofran premed and compazine as needed     Cancer related pain  - Stable with use of morphine sulfate immediate release and MS Contin  - Followed by Palliative Medicine, next appt 9/29     Bilateral pulmonary emboli  - Incidentally found July 2020, continues on Eliquis     Hyperphosphatemia  - Phos intermittently elevated but fluctuates without medication management, continue to monitor     Neuropathy, fingertips  - Mild, not effecting ADLs, continue to monitor    Multiple sclerosis  - Follows with Dr. Quiles in Neurology  - Reports good control, not currently on any mediation management       Future Appointments   Date Time Provider Department Center   8/17/2021  8:00 AM Shannan Schilling APRN CNP [facility] [place]  Me   8/17/2021  9:30 AM [facility] [place]   8/17/2021 10:00 AM BAY [facility] [place]   8/25/2021 11:00 AM NURSE ONLY [facility]   8/25/2021 11:30 AM BAY 3 [facility] [place]   9/3/2021  3:00 PM Shannan Schilling APRN CNP [facility]   9/8/2021 10:45 AM NURSE ONLY [facility]   9/8/2021 11:30 AM BAY 8 INFUSION MGINF [place]   9/14/2021  9:30 AM NURSE ONLY CANCER [facility]   9/14/2021 10:00 AM BAY 10 INFUSION MGINF [place]   9/29/2021 11:20 AM Alva Whitaker MD [facility]     The total time of this encounter amounted to 30 minutes today. This time includes video time spent with the patient, prep work, ordering tests, and performing post-visit documentation.    Shannan Schilling, GUTIERREZ Bee CNP

## 2021-08-17 NOTE — PROGRESS NOTES
Port needle left for access for treatment, Sterile technique performed and maintained. Patient tolerated procedure well. Tubes drawn in rainbow order: red, green, purple. Transparent dressing placed with use of tegaderm.    Iveth Durham RN  [facility] Oncology/Infusion [place]

## 2021-08-17 NOTE — PROGRESS NOTES
SPIRITUAL HEALTH SERVICES Progress Note  [facility]    Visited Shaneka GARCÍA Alvarez in the infusion center for ongoing emotional/spiritual support. The last spiritual health encounter was in January 2021. Offered reflective conversation, support and affirmation as she shared her journey.       Illness Narrative - Patient reports that she did get a second opinion in [place] and  they agreed with her current treatment plan and suggested that she be mindful of any research studies that may come up in the future.        Concerns - Patient feels that her cancer may be progressing, she has had more pain of late.       Coping - Patient states that she plans to stay on the current treatment as she has had good quality of life and that is very important to her.  She also was clear about what it would take to alter her plan going forward.  This gives her some feeling of agency and control in the moment.       Meaning-Making - Not discussed today.       Plan -  Patient  knows that she can request a Spiritual Health encounter as needed. I will continue to offer spiritual support going forward.     Gina Pineda  Staff

## 2021-08-18 DIAGNOSIS — F41.9 ANXIETY: ICD-10-CM

## 2021-08-18 DIAGNOSIS — F33.1 MAJOR DEPRESSIVE DISORDER, RECURRENT EPISODE, MODERATE (H): ICD-10-CM

## 2021-08-18 RX ORDER — VENLAFAXINE HYDROCHLORIDE 75 MG/1
225 CAPSULE, EXTENDED RELEASE ORAL DAILY
Qty: 90 CAPSULE | Refills: 0 | Status: SHIPPED | OUTPATIENT
Start: 2021-08-18 | End: 2021-09-10

## 2021-08-18 NOTE — TELEPHONE ENCOUNTER
Pending Prescriptions:                       Disp   Refills    venlafaxine (EFFEXOR-XR) 75 MG 24 hr capsu*270 ca*1        Sig: TAKE 3 CAPSULES(225 MG) BY MOUTH EVERY MORNING    Routing refill request to provider for review/approval because:  Labs out of range:  PHQ-9 score:    PHQ 2/18/2021   PHQ-9 Total Score 2   Q9: Thoughts of better off dead/self-harm past 2 weeks Not at all       Patient needs to be seen because:  due for visit

## 2021-08-20 NOTE — TELEPHONE ENCOUNTER
LM for patient to return phone call to clinic about message below.  Idalia Sargent CMA ([facility])

## 2021-08-22 DIAGNOSIS — I26.99 ACUTE PULMONARY EMBOLISM WITHOUT ACUTE COR PULMONALE, UNSPECIFIED PULMONARY EMBOLISM TYPE (H): ICD-10-CM

## 2021-08-22 DIAGNOSIS — F41.9 ANXIETY: ICD-10-CM

## 2021-08-22 DIAGNOSIS — F33.1 MAJOR DEPRESSIVE DISORDER, RECURRENT EPISODE, MODERATE (H): ICD-10-CM

## 2021-08-23 RX ORDER — APIXABAN 5 MG/1
TABLET, FILM COATED ORAL
Qty: 60 TABLET | Refills: 3 | Status: SHIPPED | OUTPATIENT
Start: 2021-08-23 | End: 2022-01-04

## 2021-08-23 RX ORDER — VENLAFAXINE HYDROCHLORIDE 75 MG/1
CAPSULE, EXTENDED RELEASE ORAL
Qty: 90 CAPSULE | Refills: 0 | OUTPATIENT
Start: 2021-08-23

## 2021-08-23 NOTE — TELEPHONE ENCOUNTER
Requested Prescriptions   Pending Prescriptions Disp Refills     ELIQUIS ANTICOAGULANT 5 MG tablet [Pharmacy Med Name: ELIQUIS 5MG TABLETS] 60 tablet 3     Sig: TAKE 1 TABLET(5 MG) BY MOUTH TWICE DAILY       Direct Oral Anticoagulant Agents Failed - 8/22/2021  8:08 AM        Failed - Weight is greater than 60 kg for the past year     Wt Readings from Last 3 Encounters:   08/17/21 66.2 kg (146 lb)   08/04/21 68.5 kg (151 lb 1.6 oz)   07/28/21 67.4 kg (148 lb 8 oz)             Passed - Normal Platelets on file in past 12 months     Recent Labs   Lab Test 08/17/21  0940                  Passed - Medication is active on med list        Passed - Patient is 18-79 years of age        Passed - Serum creatinine less than or equal to 1.4 on file in past 12 mos     Recent Labs   Lab Test 08/17/21  0940 12/29/16  0946 08/24/16  1206   CR 0.68   < >  --    CREAT  --   --  0.8    < > = values in this interval not displayed.       Ok to refill medication if creatinine is low          Passed - No active pregnancy on record        Passed - No positive pregnancy test within past 12 months        Passed - Recent (6 mo) or future (30 days) visit within the authorizing provider's specialty               Ivana MCCLENDONN, RN

## 2021-08-25 ENCOUNTER — INFUSION THERAPY VISIT (OUTPATIENT)
Dept: INFUSION THERAPY | Facility: CLINIC | Age: 61
End: 2021-08-25
Payer: COMMERCIAL

## 2021-08-25 ENCOUNTER — LAB (OUTPATIENT)
Dept: ONCOLOGY | Facility: CLINIC | Age: 61
End: 2021-08-25
Payer: COMMERCIAL

## 2021-08-25 VITALS — WEIGHT: 150 LBS | BODY MASS INDEX: 27.44 KG/M2 | RESPIRATION RATE: 16 BRPM

## 2021-08-25 DIAGNOSIS — D70.1 CHEMOTHERAPY-INDUCED NEUTROPENIA (H): ICD-10-CM

## 2021-08-25 DIAGNOSIS — C50.911 BILATERAL MALIGNANT NEOPLASM OF BREAST IN FEMALE, UNSPECIFIED ESTROGEN RECEPTOR STATUS, UNSPECIFIED SITE OF BREAST (H): ICD-10-CM

## 2021-08-25 DIAGNOSIS — C50.912 BILATERAL MALIGNANT NEOPLASM OF BREAST IN FEMALE, UNSPECIFIED ESTROGEN RECEPTOR STATUS, UNSPECIFIED SITE OF BREAST (H): ICD-10-CM

## 2021-08-25 DIAGNOSIS — T45.1X5A CHEMOTHERAPY-INDUCED NEUTROPENIA (H): ICD-10-CM

## 2021-08-25 DIAGNOSIS — C50.919 METASTATIC BREAST CANCER: ICD-10-CM

## 2021-08-25 DIAGNOSIS — E87.6 HYPOKALEMIA: ICD-10-CM

## 2021-08-25 DIAGNOSIS — C50.919 METASTATIC BREAST CANCER: Primary | ICD-10-CM

## 2021-08-25 DIAGNOSIS — E87.6 HYPOKALEMIA: Primary | ICD-10-CM

## 2021-08-25 LAB
ALBUMIN SERPL-MCNC: 3 G/DL (ref 3.4–5)
ALP SERPL-CCNC: 61 U/L (ref 40–150)
ALT SERPL W P-5'-P-CCNC: 22 U/L (ref 0–50)
ANION GAP SERPL CALCULATED.3IONS-SCNC: 5 MMOL/L (ref 3–14)
AST SERPL W P-5'-P-CCNC: 18 U/L (ref 0–45)
BASOPHILS # BLD MANUAL: 0 10E3/UL (ref 0–0.2)
BASOPHILS NFR BLD MANUAL: 0 %
BILIRUB SERPL-MCNC: 0.2 MG/DL (ref 0.2–1.3)
BUN SERPL-MCNC: 9 MG/DL (ref 7–30)
CALCIUM SERPL-MCNC: 8.1 MG/DL (ref 8.5–10.1)
CHLORIDE BLD-SCNC: 106 MMOL/L (ref 94–109)
CO2 SERPL-SCNC: 27 MMOL/L (ref 20–32)
CREAT SERPL-MCNC: 0.64 MG/DL (ref 0.52–1.04)
EOSINOPHIL # BLD MANUAL: 0.4 10E3/UL (ref 0–0.7)
EOSINOPHIL NFR BLD MANUAL: 11 %
ERYTHROCYTE [DISTWIDTH] IN BLOOD BY AUTOMATED COUNT: 15.1 % (ref 10–15)
GFR SERPL CREATININE-BSD FRML MDRD: >90 ML/MIN/1.73M2
GLUCOSE BLD-MCNC: 112 MG/DL (ref 70–99)
HCT VFR BLD AUTO: 35.1 % (ref 35–47)
HGB BLD-MCNC: 11.1 G/DL (ref 11.7–15.7)
LYMPHOCYTES # BLD MANUAL: 0.7 10E3/UL (ref 0.8–5.3)
LYMPHOCYTES NFR BLD MANUAL: 18 %
MAGNESIUM SERPL-MCNC: 2 MG/DL (ref 1.6–2.3)
MCH RBC QN AUTO: 28.4 PG (ref 26.5–33)
MCHC RBC AUTO-ENTMCNC: 31.6 G/DL (ref 31.5–36.5)
MCV RBC AUTO: 90 FL (ref 78–100)
MONOCYTES # BLD MANUAL: 0.3 10E3/UL (ref 0–1.3)
MONOCYTES NFR BLD MANUAL: 9 %
NEUTROPHILS # BLD MANUAL: 2.4 10E3/UL (ref 1.6–8.3)
NEUTROPHILS NFR BLD MANUAL: 62 %
PHOSPHATE SERPL-MCNC: 3.4 MG/DL (ref 2.5–4.5)
PLAT MORPH BLD: ABNORMAL
PLATELET # BLD AUTO: 177 10E3/UL (ref 150–450)
POTASSIUM BLD-SCNC: 3.8 MMOL/L (ref 3.4–5.3)
PROT SERPL-MCNC: 6.2 G/DL (ref 6.8–8.8)
RBC # BLD AUTO: 3.91 10E6/UL (ref 3.8–5.2)
RBC MORPH BLD: ABNORMAL
SODIUM SERPL-SCNC: 138 MMOL/L (ref 133–144)
WBC # BLD AUTO: 3.8 10E3/UL (ref 4–11)

## 2021-08-25 PROCEDURE — 84100 ASSAY OF PHOSPHORUS: CPT | Performed by: NURSE PRACTITIONER

## 2021-08-25 PROCEDURE — 85027 COMPLETE CBC AUTOMATED: CPT | Performed by: NURSE PRACTITIONER

## 2021-08-25 PROCEDURE — 80053 COMPREHEN METABOLIC PANEL: CPT | Performed by: NURSE PRACTITIONER

## 2021-08-25 PROCEDURE — 96375 TX/PRO/DX INJ NEW DRUG ADDON: CPT | Performed by: NURSE PRACTITIONER

## 2021-08-25 PROCEDURE — 96367 TX/PROPH/DG ADDL SEQ IV INF: CPT | Performed by: NURSE PRACTITIONER

## 2021-08-25 PROCEDURE — S0028 INJECTION, FAMOTIDINE, 20 MG: HCPCS | Performed by: NURSE PRACTITIONER

## 2021-08-25 PROCEDURE — 99207 PR NO CHARGE LOS: CPT

## 2021-08-25 PROCEDURE — 83735 ASSAY OF MAGNESIUM: CPT | Performed by: NURSE PRACTITIONER

## 2021-08-25 PROCEDURE — 96413 CHEMO IV INFUSION 1 HR: CPT | Performed by: NURSE PRACTITIONER

## 2021-08-25 RX ORDER — ACETAMINOPHEN 325 MG/1
650 TABLET ORAL ONCE
Status: COMPLETED | OUTPATIENT
Start: 2021-08-25 | End: 2021-08-25

## 2021-08-25 RX ORDER — HEPARIN SODIUM (PORCINE) LOCK FLUSH IV SOLN 100 UNIT/ML 100 UNIT/ML
5 SOLUTION INTRAVENOUS
Status: DISCONTINUED | OUTPATIENT
Start: 2021-08-25 | End: 2021-08-25 | Stop reason: HOSPADM

## 2021-08-25 RX ORDER — HEPARIN SODIUM (PORCINE) LOCK FLUSH IV SOLN 100 UNIT/ML 100 UNIT/ML
500 SOLUTION INTRAVENOUS ONCE
Status: COMPLETED | OUTPATIENT
Start: 2021-08-25 | End: 2021-08-25

## 2021-08-25 RX ORDER — DIPHENHYDRAMINE HCL 25 MG
50 CAPSULE ORAL ONCE
Status: COMPLETED | OUTPATIENT
Start: 2021-08-25 | End: 2021-08-25

## 2021-08-25 RX ADMIN — ACETAMINOPHEN 650 MG: 325 TABLET ORAL at 11:57

## 2021-08-25 RX ADMIN — HEPARIN SODIUM (PORCINE) LOCK FLUSH IV SOLN 100 UNIT/ML 500 UNITS: 100 SOLUTION at 11:18

## 2021-08-25 RX ADMIN — Medication 50 MG: at 11:56

## 2021-08-25 RX ADMIN — Medication 500 ML: at 12:01

## 2021-08-25 RX ADMIN — HEPARIN SODIUM (PORCINE) LOCK FLUSH IV SOLN 100 UNIT/ML 5 ML: 100 SOLUTION at 14:30

## 2021-08-25 ASSESSMENT — PAIN SCALES - GENERAL: PAINLEVEL: NO PAIN (0)

## 2021-08-25 NOTE — PROGRESS NOTES
See IV flow sheet for details of port access. Labs sent: cbc, cmp, magnesium, phosphorus. Port needle left in place for chemotherapy to follow.    Keira Brooks RN

## 2021-08-25 NOTE — PROGRESS NOTES
Infusion Nursing Note:  Shaneka Alvarez presents today for Trodelvy.    Patient seen by provider today: No   present during visit today: Not Applicable.    Note: Patient reports no changes since last week.    Intravenous Access:  Implanted Port.    Treatment Conditions:  Lab Results   Component Value Date    HGB 11.1 08/25/2021    HGB 12.4 07/07/2021     Lab Results   Component Value Date    WBC 3.8 08/25/2021    WBC 4.5 07/07/2021      Lab Results   Component Value Date    ANEU 2.4 08/25/2021    ANEU 3.0 07/07/2021     Lab Results   Component Value Date     08/25/2021     07/07/2021      Lab Results   Component Value Date     08/25/2021     07/07/2021                   Lab Results   Component Value Date    POTASSIUM 3.8 08/25/2021    POTASSIUM 3.9 07/07/2021           Lab Results   Component Value Date    MAG 2.0 08/25/2021    MAG 2.1 07/07/2021            Lab Results   Component Value Date    CR 0.64 08/25/2021    CR 0.69 07/07/2021                   Lab Results   Component Value Date    RAFAELA 8.1 08/25/2021    RAFAELA 9.9 07/07/2021                Lab Results   Component Value Date    BILITOTAL 0.2 08/25/2021    BILITOTAL 0.4 07/07/2021           Lab Results   Component Value Date    ALBUMIN 3.0 08/25/2021    ALBUMIN 3.3 07/07/2021                    Lab Results   Component Value Date    ALT 22 08/25/2021    ALT 33 07/07/2021           Lab Results   Component Value Date    AST 18 08/25/2021    AST 21 07/07/2021       Post Infusion Assessment:  Patient tolerated infusion without incident.  Patient observed for 30 minutes post Trodelvy per protocol.  Blood return noted pre and post infusion.  Site patent and intact, free from redness, edema or discomfort.  No evidence of extravasations.  Access discontinued per protocol.       Discharge Plan:   Patient will return 9/8 for next appointment.   Patient discharged in stable condition accompanied by: self.  Departure Mode:  Ambulatory.      Cristina Loaiza RN

## 2021-09-01 RX ORDER — HEPARIN SODIUM (PORCINE) LOCK FLUSH IV SOLN 100 UNIT/ML 100 UNIT/ML
5 SOLUTION INTRAVENOUS
Status: CANCELLED | OUTPATIENT
Start: 2021-09-08

## 2021-09-01 RX ORDER — ALBUTEROL SULFATE 90 UG/1
1-2 AEROSOL, METERED RESPIRATORY (INHALATION)
Status: CANCELLED
Start: 2021-09-15

## 2021-09-01 RX ORDER — ALBUTEROL SULFATE 0.83 MG/ML
2.5 SOLUTION RESPIRATORY (INHALATION)
Status: CANCELLED | OUTPATIENT
Start: 2021-09-08

## 2021-09-01 RX ORDER — LORAZEPAM 2 MG/ML
0.5 INJECTION INTRAMUSCULAR EVERY 4 HOURS PRN
Status: CANCELLED
Start: 2021-09-08

## 2021-09-01 RX ORDER — SODIUM CHLORIDE 9 MG/ML
1000 INJECTION, SOLUTION INTRAVENOUS CONTINUOUS PRN
Status: CANCELLED
Start: 2021-09-08

## 2021-09-01 RX ORDER — NALOXONE HYDROCHLORIDE 0.4 MG/ML
.1-.4 INJECTION, SOLUTION INTRAMUSCULAR; INTRAVENOUS; SUBCUTANEOUS
Status: CANCELLED | OUTPATIENT
Start: 2021-09-15

## 2021-09-01 RX ORDER — MEPERIDINE HYDROCHLORIDE 25 MG/ML
25 INJECTION INTRAMUSCULAR; INTRAVENOUS; SUBCUTANEOUS EVERY 30 MIN PRN
Status: CANCELLED | OUTPATIENT
Start: 2021-09-15

## 2021-09-01 RX ORDER — DIPHENHYDRAMINE HCL 25 MG
50 CAPSULE ORAL ONCE
Status: CANCELLED | OUTPATIENT
Start: 2021-09-08

## 2021-09-01 RX ORDER — LORAZEPAM 2 MG/ML
0.5 INJECTION INTRAMUSCULAR EVERY 4 HOURS PRN
Status: CANCELLED
Start: 2021-09-15

## 2021-09-01 RX ORDER — DIPHENHYDRAMINE HYDROCHLORIDE 50 MG/ML
50 INJECTION INTRAMUSCULAR; INTRAVENOUS
Status: CANCELLED
Start: 2021-09-08

## 2021-09-01 RX ORDER — ALBUTEROL SULFATE 90 UG/1
1-2 AEROSOL, METERED RESPIRATORY (INHALATION)
Status: CANCELLED
Start: 2021-09-08

## 2021-09-01 RX ORDER — ATROPINE SULFATE 0.4 MG/ML
0.4 AMPUL (ML) INJECTION
Status: CANCELLED | OUTPATIENT
Start: 2021-09-08

## 2021-09-01 RX ORDER — HEPARIN SODIUM,PORCINE 10 UNIT/ML
5 VIAL (ML) INTRAVENOUS
Status: CANCELLED | OUTPATIENT
Start: 2021-09-15

## 2021-09-01 RX ORDER — SODIUM CHLORIDE 9 MG/ML
1000 INJECTION, SOLUTION INTRAVENOUS CONTINUOUS PRN
Status: CANCELLED
Start: 2021-09-15

## 2021-09-01 RX ORDER — ATROPINE SULFATE 0.4 MG/ML
0.4 AMPUL (ML) INJECTION
Status: CANCELLED | OUTPATIENT
Start: 2021-09-15

## 2021-09-01 RX ORDER — ALBUTEROL SULFATE 0.83 MG/ML
2.5 SOLUTION RESPIRATORY (INHALATION)
Status: CANCELLED | OUTPATIENT
Start: 2021-09-15

## 2021-09-01 RX ORDER — EPINEPHRINE 1 MG/ML
0.3 INJECTION, SOLUTION INTRAMUSCULAR; SUBCUTANEOUS EVERY 5 MIN PRN
Status: CANCELLED | OUTPATIENT
Start: 2021-09-08

## 2021-09-01 RX ORDER — METHYLPREDNISOLONE SODIUM SUCCINATE 125 MG/2ML
125 INJECTION, POWDER, LYOPHILIZED, FOR SOLUTION INTRAMUSCULAR; INTRAVENOUS
Status: CANCELLED
Start: 2021-09-15

## 2021-09-01 RX ORDER — METHYLPREDNISOLONE SODIUM SUCCINATE 125 MG/2ML
125 INJECTION, POWDER, LYOPHILIZED, FOR SOLUTION INTRAMUSCULAR; INTRAVENOUS
Status: CANCELLED
Start: 2021-09-08

## 2021-09-01 RX ORDER — EPINEPHRINE 1 MG/ML
0.3 INJECTION, SOLUTION INTRAMUSCULAR; SUBCUTANEOUS EVERY 5 MIN PRN
Status: CANCELLED | OUTPATIENT
Start: 2021-09-15

## 2021-09-01 RX ORDER — MEPERIDINE HYDROCHLORIDE 25 MG/ML
25 INJECTION INTRAMUSCULAR; INTRAVENOUS; SUBCUTANEOUS EVERY 30 MIN PRN
Status: CANCELLED | OUTPATIENT
Start: 2021-09-08

## 2021-09-01 RX ORDER — ACETAMINOPHEN 325 MG/1
650 TABLET ORAL ONCE
Status: CANCELLED | OUTPATIENT
Start: 2021-09-08

## 2021-09-01 RX ORDER — HEPARIN SODIUM (PORCINE) LOCK FLUSH IV SOLN 100 UNIT/ML 100 UNIT/ML
5 SOLUTION INTRAVENOUS
Status: CANCELLED | OUTPATIENT
Start: 2021-09-15

## 2021-09-01 RX ORDER — HEPARIN SODIUM,PORCINE 10 UNIT/ML
5 VIAL (ML) INTRAVENOUS
Status: CANCELLED | OUTPATIENT
Start: 2021-09-08

## 2021-09-01 RX ORDER — ACETAMINOPHEN 325 MG/1
650 TABLET ORAL ONCE
Status: CANCELLED | OUTPATIENT
Start: 2021-09-15

## 2021-09-01 RX ORDER — DIPHENHYDRAMINE HYDROCHLORIDE 50 MG/ML
50 INJECTION INTRAMUSCULAR; INTRAVENOUS
Status: CANCELLED
Start: 2021-09-15

## 2021-09-01 RX ORDER — DIPHENHYDRAMINE HCL 25 MG
50 CAPSULE ORAL ONCE
Status: CANCELLED | OUTPATIENT
Start: 2021-09-15

## 2021-09-01 RX ORDER — NALOXONE HYDROCHLORIDE 0.4 MG/ML
.1-.4 INJECTION, SOLUTION INTRAMUSCULAR; INTRAVENOUS; SUBCUTANEOUS
Status: CANCELLED | OUTPATIENT
Start: 2021-09-08

## 2021-09-03 ENCOUNTER — VIRTUAL VISIT (OUTPATIENT)
Dept: ONCOLOGY | Facility: CLINIC | Age: 61
End: 2021-09-03
Payer: COMMERCIAL

## 2021-09-03 VITALS — BODY MASS INDEX: 27.6 KG/M2 | WEIGHT: 150 LBS | HEIGHT: 62 IN

## 2021-09-03 DIAGNOSIS — G89.3 CANCER ASSOCIATED PAIN: ICD-10-CM

## 2021-09-03 DIAGNOSIS — I26.99 PULMONARY EMBOLISM WITHOUT ACUTE COR PULMONALE, UNSPECIFIED CHRONICITY, UNSPECIFIED PULMONARY EMBOLISM TYPE (H): ICD-10-CM

## 2021-09-03 DIAGNOSIS — C50.919 METASTATIC BREAST CANCER: Primary | ICD-10-CM

## 2021-09-03 DIAGNOSIS — C79.51 BONE METASTASES: ICD-10-CM

## 2021-09-03 DIAGNOSIS — G89.3 CANCER ASSOCIATED PAIN: Primary | ICD-10-CM

## 2021-09-03 DIAGNOSIS — R11.2 NON-INTRACTABLE VOMITING WITH NAUSEA, UNSPECIFIED VOMITING TYPE: ICD-10-CM

## 2021-09-03 DIAGNOSIS — G35 MULTIPLE SCLEROSIS (H): ICD-10-CM

## 2021-09-03 PROCEDURE — 99214 OFFICE O/P EST MOD 30 MIN: CPT | Mod: GT | Performed by: NURSE PRACTITIONER

## 2021-09-03 RX ORDER — IBUPROFEN 600 MG/1
600 TABLET, FILM COATED ORAL EVERY 6 HOURS PRN
Qty: 90 TABLET | Refills: 3 | Status: SHIPPED | OUTPATIENT
Start: 2021-09-03 | End: 2021-09-03

## 2021-09-03 RX ORDER — IBUPROFEN 600 MG/1
600 TABLET, FILM COATED ORAL EVERY 6 HOURS PRN
Qty: 90 TABLET | Refills: 3 | Status: SHIPPED | OUTPATIENT
Start: 2021-09-03 | End: 2023-03-12

## 2021-09-03 ASSESSMENT — MIFFLIN-ST. JEOR: SCORE: 1208.65

## 2021-09-03 NOTE — NURSING NOTE
Shaneka is a 61 year old who is being evaluated via a billable video visit.      How would you like to obtain your AVS? MyChart  If the video visit is dropped, the invitation should be resent by: Text to cell phone: [phone]  Will anyone else be joining your video visit? No      Video-Visit Details    Type of service:  Video Visit      Originating Location (pt. Location): Home    Distant Location (provider location):  [facility]     Platform used for Video Visit: Imtiaz     Concerns:  Patient has been sick for the last week with bad headaches, vomiting and nausea.  Thinks she might have broken a rib on her left side/pain 7/10.  Would like a refill for ibuprofen.    Rocío James CMA

## 2021-09-03 NOTE — LETTER
9/3/2021         RE: Shaneka Alvarez  [address] [facility] [address]      Hematology/Oncology Video Visit  - Patient was at home and provider was at [facility]  - Visit lasted 22 minutes    Care Team:  - Oncologist: Dr. Zepeda  - PCP: [facility]     Reason for visit: exam prior to C15D1 sacituzumab (Trodelvy)     Diagnosis: metastatic (bone) triple negative breast cancer     Cancer and Treatment Hx:  INITIAL DIAGNOSIS  Jan 2006: diagnosed with Stage IIB, T2 N1 M0 invasive lobular carcinoma of the right breast  In January 2006.  Final pathology showed a 4.5 x 3.8 x 2.5 cm, 01/14 lymph nodes positive.  ER/UT+ and HER-2 negative. Genetics- BRCA1 and 2 mutations not detected. 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   Jan 2007: Completed radiotherapy to the right breast of 5040 cGy.   March 2007-2014: Aromasin, stopped after moving to the [facility]  Jan 2016: bilateral mastectomy with flap reconstruction     RELAPSE  Aug 2019: MRI brain as follow-up for multiple sclerosis found multiple calvarial lesions noted suspicious for metastatic disease. No evidence of new MS lesions. PET scan showed widespread bone metastatic lesions (calvarium, spine, sacrum, pelvis, ribs most prominent at C7, T3, L1 and L4), hypermetabolic 3 cm lesion in LLL, and mediastinal/hilar LN. CEA at 1.9 and C27-29 at 415 (28 on 8/23/16). A CT guided biopsy of the right iliac bone was consistent with metastatic adenocarcinoma (breast), ER/UT negative, HER-2 negative PDL 1 < 1%. A second biopsy of the LLL nodule via EBUS on 9/5/19 was negative for malignancy.  Sept 2019: MRI C/T/L spine with numerous enhancing lesions of the spine, largest around T9 and L1. No evidence of cord compression. L1 compression fracture and impending L4. Neurosurgery recommended no surgical intervention but to wear Gorge  brace when out of bed and HOB >30 degrees.  Sept 18, 2019: completed T12-L5 radiation in 10 fractions =3000 cGy  Oct 2019: Xeloda x 2 trials lowering dose to 1000 mg bid and every 3 month Zometa  Nov 2019- Jan 2020: weekly paclitaxel x 3 cycles  Jan-Oct 2020: Eribulin  Nov 2020- current: Trodelvy  Feb 25, 2021: repeat biopsy done from the right acetabulum showed metastatic lobular breast cancer. ER/WI negative, HER-2 FISH negative. Androgen receptor is diffusely positive. PD-L1 negative. Foundation 1 testing did not reveal any actionable mutations    Interval History:  Shaneka notes that she was really feeling unwell starting last Thursday and just started feeling better a few days ago. She had nausea with vomiting and headache. She has never had these symptoms with treatment before. She is not sure if she caught something but is reassured that she is feeling better each day. She notes that headache was right-sided and is currently much better but still intermittently occurs. She is taking ibuprofen about twice daily and is not sure if it is helpful or not. She is vomiting about once per day without nausea beforehand, typically after activity. She noticed left rib pain after vomiting and it still bothers her when she moves but does not bother her with each breath. She has started to increase her food intake and is currently drinking lots of water and taking in protein shakes and bland foods like pasta. Feeling like constipation is more of an issue and utilizing stool softeners as needed. Chronic pain currently well managed with OxyContin BID and morphine as needed for breakthrough pain. No fevers, weakness, change in balance, vision changes, abdominal pain, or other acute symptomology. We discussed whether or not proceeding with treatment is a good idea and she feels that if she continues to improve at the rate that she has been then she feels ready. She knows there is a low-threshold to delay to allow her to recover  further.    Medications:  Current Outpatient Medications   Medication     acetaminophen (TYLENOL) 500 MG tablet     albuterol (PROAIR HFA/PROVENTIL HFA/VENTOLIN HFA) 108 (90 Base) MCG/ACT inhaler     busPIRone (BUSPAR) 15 MG tablet     calcium citrate-vitamin D (CITRACAL) 315-250 MG-UNIT TABS     Cholecalciferol (VITAMIN D3 PO)     ELIQUIS ANTICOAGULANT 5 MG tablet     HEMP OIL OR EXTRACT OR OTHER CBD CANNABINOID, NOT MEDICAL CANNABIS,     loratadine (CLARITIN) 10 MG tablet     LORazepam (ATIVAN) 1 MG tablet     magnesium 250 MG tablet     morphine (MS CONTIN) 15 MG CR tablet     morphine (MSIR) 15 MG IR tablet     OLANZapine (ZYPREXA) 5 MG tablet     promethazine (PHENERGAN) 25 MG tablet     propranolol ER (INDERAL LA) 80 MG 24 hr capsule     senna-docusate (SENOKOT-S/PERICOLACE) 8.6-50 MG tablet     traZODone (DESYREL) 50 MG tablet     venlafaxine (EFFEXOR-XR) 75 MG 24 hr capsule     No current facility-administered medications for this visit.     Physical Exam:   GEN: pleasantly conversant female no acute distress  SKIN: generally intact, no visible rash, bruising, or sores   ENT: eyes non-icteric, EOMI  RESP: breathing easily on room air, no cough  MSK: appears to have normal range of motion based on visualized movements  NEURO:  no notable tremors, facial movements symmetric, alert and oriented without obvious focal deficit    The rest of a comprehensive physical examination is deferred due to PHE (public health emergency) video restrictions  There were no vitals taken for this visit.    Wt Readings from Last 4 Encounters:   08/25/21 68 kg (150 lb)   08/17/21 66.2 kg (146 lb)   08/04/21 68.5 kg (151 lb 1.6 oz)   07/28/21 67.4 kg (148 lb 8 oz)     Labs:  Labs will be drawn prior to infusion on 9/8    Imaging:  Reviewed PET/CT whole body from 6/11/21:  IMPRESSION: In this patient with a history of metastatic breast cancer status post bilateral mastectomies and chemoradiation:  1. Mildly increased FDG uptake to  multiple osseous lesions which were previously either nonavid or decreasing in uptake on 2/5/2021, concerning for reactivated metastases, less likely flare phenomenon.  2. Unchanged uptake to the right iliac/acetabular lesion which was previously increasing, concerning for ongoing active metastasis.  3. Small hiatal hernia. Ongoing uptake throughout the esophagus most consistent with esophagitis    Assessment and Plan:  Metastatic breast cancer  Anemia secondary to chemotherapy  - PET/CT on 6/11/2021 showed mildly increased uptake in some bone lesions with stability in other bone lesions  - Given current good quality of life and general slow progression of metastatic disease, decision was made to continue with Trodelvy  - Sees Dr. Castillo in [place] who agreed with going forward with Todelvy and recommended a possible clinical trial once the decision is made to switch from Todelvy (after 20% growth noted on imaging) or androgen receptor blockers  - No clinical trials available here as of 8/2 per research nurse  - Currently meeting goals, if labs appropriate then ok for C15D1 on 9/8  - Follow up prior to each cycle  - Will plan repeat PET/CT prior to cycle 16 with Dr. Zepeda follow up afterwards     Headache  Vomiting  - Experienced over past week but has been steadily improving  - Unlikely secondary to chemotherapy as she has not experienced similar symptoms up until this point  - Recommended Tylenol for residual headache and compazine prior to planned activity  - Continue to monitor and consider delaying chemotherapy if not resolved prior to planned next cycle    Metastasis to bone  - S/P radiation to T12-L5 in Sept 2019, at that time Ortho recommended conservative management  - Receives monthly Xgeva   - Continues on calcium and vitamin D supplementation     Nausea secondary to chemotherapy  - Improved significantly with use of Emend and decadron with zofran premed and compazine as needed     Cancer related  pain  - Stable with use of morphine sulfate immediate release and MS Contin  - Followed by Palliative Medicine, next appt 9/29     Bilateral pulmonary emboli  - Incidentally found July 2020, continues on Eliquis      Multiple sclerosis  - Follows with Dr. Quiles in Neurology  - Reports good control, not currently on any mediation management        Future Appointments   Date Time Provider Department Center   9/3/2021  3:00 PM Shannan Schilling APRN CNP [facility]   9/8/2021 10:45 AM NURSE ONLY [facility]   9/8/2021 11:30 AM BAY 8 [facility] [place]   9/10/2021  8:20 AM Spenser Judd PA-C [facility] [place]   9/14/2021  9:30 AM NURSE ONLY [facility]   9/14/2021 10:00 AM BAY 10 [facility] [place]   9/28/2021  7:15 AM NURSE ONLY [facility]   9/28/2021  7:30 AM Dewayne Zepeda MD [facility]   9/28/2021  8:00 AM BAY 11 [facility] [place]   9/29/2021 11:20 AM Alva Whitaker MD [facility]   10/5/2021  9:00 AM BAY 10 [facility]ION [facility] [place]   The total time of this encounter amounted to 30 minutes today. This time includes video time spent with the patient, prep work, ordering tests, and performing post-visit documentation.    Shannan Schilling, GUTIERREZ Bee CNP

## 2021-09-03 NOTE — PROGRESS NOTES
Hematology/Oncology Video Visit  - Patient was at home and provider was at [facility]  - Visit lasted 22 minutes    Care Team:  - Oncologist: Dr. Zepeda  - PCP: [facility]     Reason for visit: exam prior to C15D1 sacituzumab (Trodelvy)     Diagnosis: metastatic (bone) triple negative breast cancer     Cancer and Treatment Hx:  INITIAL DIAGNOSIS  Jan 2006: diagnosed with Stage IIB, T2 N1 M0 invasive lobular carcinoma of the right breast  In January 2006.  Final pathology showed a 4.5 x 3.8 x 2.5 cm, 01/14 lymph nodes positive.  ER/CT+ and HER-2 negative. Genetics- BRCA1 and 2 mutations not detected. 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   Jan 2007: Completed radiotherapy to the right breast of 5040 cGy.   March 2007-2014: Aromasin, stopped after moving to the [facility]  Jan 2016: bilateral mastectomy with flap reconstruction     RELAPSE  Aug 2019: MRI brain as follow-up for multiple sclerosis found multiple calvarial lesions noted suspicious for metastatic disease. No evidence of new MS lesions. PET scan showed widespread bone metastatic lesions (calvarium, spine, sacrum, pelvis, ribs most prominent at C7, T3, L1 and L4), hypermetabolic 3 cm lesion in LLL, and mediastinal/hilar LN. CEA at 1.9 and C27-29 at 415 (28 on 8/23/16). A CT guided biopsy of the right iliac bone was consistent with metastatic adenocarcinoma (breast), ER/CT negative, HER-2 negative PDL 1 < 1%. A second biopsy of the LLL nodule via EBUS on 9/5/19 was negative for malignancy.  Sept 2019: MRI C/T/L spine with numerous enhancing lesions of the spine, largest around T9 and L1. No evidence of cord compression. L1 compression fracture and impending L4. Neurosurgery recommended no surgical intervention but to wear Gorge brace when out of bed and HOB >30 degrees.  Sept 18, 2019: completed T12-L5 radiation in 10  fractions =3000 cGy  Oct 2019: Xeloda x 2 trials lowering dose to 1000 mg bid and every 3 month Zometa  Nov 2019- Jan 2020: weekly paclitaxel x 3 cycles  Jan-Oct 2020: Eribulin  Nov 2020- current: Trodelvy  Feb 25, 2021: repeat biopsy done from the right acetabulum showed metastatic lobular breast cancer. ER/MT negative, HER-2 FISH negative. Androgen receptor is diffusely positive. PD-L1 negative. Foundation 1 testing did not reveal any actionable mutations    Interval History:  Shaneka notes that she was really feeling unwell starting last Thursday and just started feeling better a few days ago. She had nausea with vomiting and headache. She has never had these symptoms with treatment before. She is not sure if she caught something but is reassured that she is feeling better each day. She notes that headache was right-sided and is currently much better but still intermittently occurs. She is taking ibuprofen about twice daily and is not sure if it is helpful or not. She is vomiting about once per day without nausea beforehand, typically after activity. She noticed left rib pain after vomiting and it still bothers her when she moves but does not bother her with each breath. She has started to increase her food intake and is currently drinking lots of water and taking in protein shakes and bland foods like pasta. Feeling like constipation is more of an issue and utilizing stool softeners as needed. Chronic pain currently well managed with OxyContin BID and morphine as needed for breakthrough pain. No fevers, weakness, change in balance, vision changes, abdominal pain, or other acute symptomology. We discussed whether or not proceeding with treatment is a good idea and she feels that if she continues to improve at the rate that she has been then she feels ready. She knows there is a low-threshold to delay to allow her to recover further.    Medications:  Current Outpatient Medications   Medication     acetaminophen  (TYLENOL) 500 MG tablet     albuterol (PROAIR HFA/PROVENTIL HFA/VENTOLIN HFA) 108 (90 Base) MCG/ACT inhaler     busPIRone (BUSPAR) 15 MG tablet     calcium citrate-vitamin D (CITRACAL) 315-250 MG-UNIT TABS     Cholecalciferol (VITAMIN D3 PO)     ELIQUIS ANTICOAGULANT 5 MG tablet     HEMP OIL OR EXTRACT OR OTHER CBD CANNABINOID, NOT MEDICAL CANNABIS,     loratadine (CLARITIN) 10 MG tablet     LORazepam (ATIVAN) 1 MG tablet     magnesium 250 MG tablet     morphine (MS CONTIN) 15 MG CR tablet     morphine (MSIR) 15 MG IR tablet     OLANZapine (ZYPREXA) 5 MG tablet     promethazine (PHENERGAN) 25 MG tablet     propranolol ER (INDERAL LA) 80 MG 24 hr capsule     senna-docusate (SENOKOT-S/PERICOLACE) 8.6-50 MG tablet     traZODone (DESYREL) 50 MG tablet     venlafaxine (EFFEXOR-XR) 75 MG 24 hr capsule     No current facility-administered medications for this visit.     Physical Exam:   GEN: pleasantly conversant female no acute distress  SKIN: generally intact, no visible rash, bruising, or sores   ENT: eyes non-icteric, EOMI  RESP: breathing easily on room air, no cough  MSK: appears to have normal range of motion based on visualized movements  NEURO:  no notable tremors, facial movements symmetric, alert and oriented without obvious focal deficit    The rest of a comprehensive physical examination is deferred due to PHE (public health emergency) video restrictions  There were no vitals taken for this visit.    Wt Readings from Last 4 Encounters:   08/25/21 68 kg (150 lb)   08/17/21 66.2 kg (146 lb)   08/04/21 68.5 kg (151 lb 1.6 oz)   07/28/21 67.4 kg (148 lb 8 oz)     Labs:  Labs will be drawn prior to infusion on 9/8    Imaging:  Reviewed PET/CT whole body from 6/11/21:  IMPRESSION: In this patient with a history of metastatic breast cancer status post bilateral mastectomies and chemoradiation:  1. Mildly increased FDG uptake to multiple osseous lesions which were previously either nonavid or decreasing in uptake on  2/5/2021, concerning for reactivated metastases, less likely flare phenomenon.  2. Unchanged uptake to the right iliac/acetabular lesion which was previously increasing, concerning for ongoing active metastasis.  3. Small hiatal hernia. Ongoing uptake throughout the esophagus most consistent with esophagitis    Assessment and Plan:  Metastatic breast cancer  Anemia secondary to chemotherapy  - PET/CT on 6/11/2021 showed mildly increased uptake in some bone lesions with stability in other bone lesions  - Given current good quality of life and general slow progression of metastatic disease, decision was made to continue with Trodelvy  - Sees Dr. Castillo in [place] who agreed with going forward with Todelvy and recommended a possible clinical trial once the decision is made to switch from Todelvy (after 20% growth noted on imaging) or androgen receptor blockers  - No clinical trials available here as of 8/2 per research nurse  - Currently meeting goals, if labs appropriate then ok for C15D1 on 9/8  - Follow up prior to each cycle  - Will plan repeat PET/CT prior to cycle 16 with Dr. Zepeda follow up afterwards     Headache  Vomiting  - Experienced over past week but has been steadily improving  - Unlikely secondary to chemotherapy as she has not experienced similar symptoms up until this point  - Recommended Tylenol for residual headache and compazine prior to planned activity  - Continue to monitor and consider delaying chemotherapy if not resolved prior to planned next cycle    Metastasis to bone  - S/P radiation to T12-L5 in Sept 2019, at that time Ortho recommended conservative management  - Receives monthly Xgeva   - Continues on calcium and vitamin D supplementation     Nausea secondary to chemotherapy  - Improved significantly with use of Emend and decadron with zofran premed and compazine as needed     Cancer related pain  - Stable with use of morphine sulfate immediate release and MS Contin  - Followed by  Palliative Medicine, next appt 9/29     Bilateral pulmonary emboli  - Incidentally found July 2020, continues on Eliquis      Multiple sclerosis  - Follows with Dr. Quiles in Neurology  - Reports good control, not currently on any mediation management        Future Appointments   Date Time Provider Department Center   9/3/2021  3:00 PM Shannan Schilling APRN CNP [facility]   9/8/2021 10:45 AM NURSE ONLY [facility]   9/8/2021 11:30 AM BAY [facility] [place]   9/10/2021  8:20 AM Spenser Judd PA-C [facility] [place]   9/14/2021  9:30 AM NURSE ONLY [facility]   9/14/2021 10:00 AM BAY 10 [facility] [place]   9/28/2021  7:15 AM NURSE ONLY [facility]   9/28/2021  7:30 AM Dewayne Zepeda MD [facility]   9/28/2021  8:00 AM BAY 11 [facility]ION [facility] [place]   9/29/2021 11:20 AM Alva Whitaker MD [facility]   10/5/2021  9:00 AM BAY 10 [facility]ION [facility] [place]   The total time of this encounter amounted to 30 minutes today. This time includes video time spent with the patient, prep work, ordering tests, and performing post-visit documentation.    Shannan Schilling CNP

## 2021-09-03 NOTE — TELEPHONE ENCOUNTER
Received fax from pharmacy requesting refill of ibuprofen.     Last refill: 3/3/21  Last office visit: 6/28/21  Scheduled for follow up 9/29/21     Will route request to MD for review.     Reviewed [place]  Report.

## 2021-09-07 ENCOUNTER — MYC MEDICAL ADVICE (OUTPATIENT)
Dept: ONCOLOGY | Facility: CLINIC | Age: 61
End: 2021-09-07

## 2021-09-08 ENCOUNTER — INFUSION THERAPY VISIT (OUTPATIENT)
Dept: INFUSION THERAPY | Facility: CLINIC | Age: 61
End: 2021-09-08
Payer: COMMERCIAL

## 2021-09-08 ENCOUNTER — LAB (OUTPATIENT)
Dept: ONCOLOGY | Facility: CLINIC | Age: 61
End: 2021-09-08
Payer: COMMERCIAL

## 2021-09-08 VITALS
SYSTOLIC BLOOD PRESSURE: 125 MMHG | OXYGEN SATURATION: 100 % | HEART RATE: 74 BPM | TEMPERATURE: 97.8 F | WEIGHT: 139.6 LBS | BODY MASS INDEX: 25.53 KG/M2 | DIASTOLIC BLOOD PRESSURE: 87 MMHG | RESPIRATION RATE: 18 BRPM

## 2021-09-08 DIAGNOSIS — C50.912 BILATERAL MALIGNANT NEOPLASM OF BREAST IN FEMALE, UNSPECIFIED ESTROGEN RECEPTOR STATUS, UNSPECIFIED SITE OF BREAST (H): ICD-10-CM

## 2021-09-08 DIAGNOSIS — C50.911 BILATERAL MALIGNANT NEOPLASM OF BREAST IN FEMALE, UNSPECIFIED ESTROGEN RECEPTOR STATUS, UNSPECIFIED SITE OF BREAST (H): ICD-10-CM

## 2021-09-08 DIAGNOSIS — C50.919 METASTATIC BREAST CANCER: Primary | ICD-10-CM

## 2021-09-08 DIAGNOSIS — C79.51 BONE METASTASES: Primary | ICD-10-CM

## 2021-09-08 DIAGNOSIS — C50.919 METASTATIC BREAST CANCER: ICD-10-CM

## 2021-09-08 DIAGNOSIS — E87.6 HYPOKALEMIA: Primary | ICD-10-CM

## 2021-09-08 DIAGNOSIS — E87.6 HYPOKALEMIA: ICD-10-CM

## 2021-09-08 DIAGNOSIS — T45.1X5A CHEMOTHERAPY-INDUCED NEUTROPENIA (H): ICD-10-CM

## 2021-09-08 DIAGNOSIS — D70.1 CHEMOTHERAPY-INDUCED NEUTROPENIA (H): ICD-10-CM

## 2021-09-08 LAB
ALBUMIN SERPL-MCNC: 3.7 G/DL (ref 3.4–5)
ALP SERPL-CCNC: 62 U/L (ref 40–150)
ALT SERPL W P-5'-P-CCNC: 23 U/L (ref 0–50)
ANION GAP SERPL CALCULATED.3IONS-SCNC: 7 MMOL/L (ref 3–14)
AST SERPL W P-5'-P-CCNC: 25 U/L (ref 0–45)
BASOPHILS # BLD AUTO: 0.1 10E3/UL (ref 0–0.2)
BASOPHILS NFR BLD AUTO: 1 %
BILIRUB SERPL-MCNC: 0.6 MG/DL (ref 0.2–1.3)
BUN SERPL-MCNC: 13 MG/DL (ref 7–30)
CALCIUM SERPL-MCNC: 8.9 MG/DL (ref 8.5–10.1)
CHLORIDE BLD-SCNC: 96 MMOL/L (ref 94–109)
CO2 SERPL-SCNC: 28 MMOL/L (ref 20–32)
CREAT SERPL-MCNC: 0.62 MG/DL (ref 0.52–1.04)
EOSINOPHIL # BLD AUTO: 0.2 10E3/UL (ref 0–0.7)
EOSINOPHIL NFR BLD AUTO: 3 %
ERYTHROCYTE [DISTWIDTH] IN BLOOD BY AUTOMATED COUNT: 14.1 % (ref 10–15)
GFR SERPL CREATININE-BSD FRML MDRD: >90 ML/MIN/1.73M2
GLUCOSE BLD-MCNC: 115 MG/DL (ref 70–99)
HCT VFR BLD AUTO: 39.2 % (ref 35–47)
HGB BLD-MCNC: 13.3 G/DL (ref 11.7–15.7)
IMM GRANULOCYTES # BLD: 0.1 10E3/UL
IMM GRANULOCYTES NFR BLD: 1 %
LYMPHOCYTES # BLD AUTO: 0.6 10E3/UL (ref 0.8–5.3)
LYMPHOCYTES NFR BLD AUTO: 10 %
MAGNESIUM SERPL-MCNC: 1.9 MG/DL (ref 1.6–2.3)
MCH RBC QN AUTO: 28.4 PG (ref 26.5–33)
MCHC RBC AUTO-ENTMCNC: 33.9 G/DL (ref 31.5–36.5)
MCV RBC AUTO: 84 FL (ref 78–100)
MONOCYTES # BLD AUTO: 0.9 10E3/UL (ref 0–1.3)
MONOCYTES NFR BLD AUTO: 14 %
NEUTROPHILS # BLD AUTO: 4.6 10E3/UL (ref 1.6–8.3)
NEUTROPHILS NFR BLD AUTO: 71 %
NRBC # BLD AUTO: 0 10E3/UL
NRBC BLD AUTO-RTO: 0 /100
PHOSPHATE SERPL-MCNC: 2.8 MG/DL (ref 2.5–4.5)
PLATELET # BLD AUTO: 271 10E3/UL (ref 150–450)
POTASSIUM BLD-SCNC: 3.2 MMOL/L (ref 3.4–5.3)
PROT SERPL-MCNC: 7.1 G/DL (ref 6.8–8.8)
RBC # BLD AUTO: 4.68 10E6/UL (ref 3.8–5.2)
SODIUM SERPL-SCNC: 131 MMOL/L (ref 133–144)
WBC # BLD AUTO: 6.4 10E3/UL (ref 4–11)

## 2021-09-08 PROCEDURE — 96413 CHEMO IV INFUSION 1 HR: CPT | Performed by: NURSE PRACTITIONER

## 2021-09-08 PROCEDURE — 99207 PR NO CHARGE LOS: CPT

## 2021-09-08 PROCEDURE — 83735 ASSAY OF MAGNESIUM: CPT | Performed by: NURSE PRACTITIONER

## 2021-09-08 PROCEDURE — 86300 IMMUNOASSAY TUMOR CA 15-3: CPT | Performed by: NURSE PRACTITIONER

## 2021-09-08 PROCEDURE — 80053 COMPREHEN METABOLIC PANEL: CPT | Performed by: NURSE PRACTITIONER

## 2021-09-08 PROCEDURE — 84100 ASSAY OF PHOSPHORUS: CPT | Performed by: NURSE PRACTITIONER

## 2021-09-08 PROCEDURE — S0028 INJECTION, FAMOTIDINE, 20 MG: HCPCS | Performed by: NURSE PRACTITIONER

## 2021-09-08 PROCEDURE — 96367 TX/PROPH/DG ADDL SEQ IV INF: CPT | Performed by: NURSE PRACTITIONER

## 2021-09-08 PROCEDURE — 96375 TX/PRO/DX INJ NEW DRUG ADDON: CPT | Performed by: NURSE PRACTITIONER

## 2021-09-08 PROCEDURE — 85025 COMPLETE CBC W/AUTO DIFF WBC: CPT | Performed by: NURSE PRACTITIONER

## 2021-09-08 RX ORDER — ACETAMINOPHEN 325 MG/1
650 TABLET ORAL ONCE
Status: COMPLETED | OUTPATIENT
Start: 2021-09-08 | End: 2021-09-08

## 2021-09-08 RX ORDER — POTASSIUM CHLORIDE 1500 MG/1
40 TABLET, EXTENDED RELEASE ORAL ONCE
Status: DISCONTINUED | OUTPATIENT
Start: 2021-09-08 | End: 2021-09-08

## 2021-09-08 RX ORDER — DIPHENHYDRAMINE HCL 25 MG
50 CAPSULE ORAL ONCE
Status: COMPLETED | OUTPATIENT
Start: 2021-09-08 | End: 2021-09-08

## 2021-09-08 RX ORDER — HEPARIN SODIUM (PORCINE) LOCK FLUSH IV SOLN 100 UNIT/ML 100 UNIT/ML
5 SOLUTION INTRAVENOUS
Status: DISCONTINUED | OUTPATIENT
Start: 2021-09-08 | End: 2021-09-08 | Stop reason: HOSPADM

## 2021-09-08 RX ORDER — HEPARIN SODIUM (PORCINE) LOCK FLUSH IV SOLN 100 UNIT/ML 100 UNIT/ML
500 SOLUTION INTRAVENOUS ONCE
Status: COMPLETED | OUTPATIENT
Start: 2021-09-08 | End: 2021-09-08

## 2021-09-08 RX ORDER — POTASSIUM CHLORIDE 750 MG/1
40 TABLET, EXTENDED RELEASE ORAL ONCE
Status: COMPLETED | OUTPATIENT
Start: 2021-09-08 | End: 2021-09-08

## 2021-09-08 RX ADMIN — Medication 500 ML: at 11:43

## 2021-09-08 RX ADMIN — HEPARIN SODIUM (PORCINE) LOCK FLUSH IV SOLN 100 UNIT/ML 5 ML: 100 SOLUTION at 14:42

## 2021-09-08 RX ADMIN — ACETAMINOPHEN 650 MG: 325 TABLET ORAL at 11:43

## 2021-09-08 RX ADMIN — POTASSIUM CHLORIDE 40 MEQ: 750 TABLET, EXTENDED RELEASE ORAL at 11:46

## 2021-09-08 RX ADMIN — HEPARIN SODIUM (PORCINE) LOCK FLUSH IV SOLN 100 UNIT/ML 500 UNITS: 100 SOLUTION at 10:50

## 2021-09-08 RX ADMIN — Medication 50 MG: at 11:43

## 2021-09-08 NOTE — PROGRESS NOTES
"Infusion Nursing Note:  Shaneka Alvarez presents today for C15,D1 of Trodelvy.    Patient seen by provider today: No   present during visit today: Not Applicable.    Note: Pt states she is feeling better today after having nausea/vomiting and a headache last week - \"I think I must have caught something\", denies any problems today , feels comfortable having her infusion, will treat as ordered  .  Potassium is 3.2 today, Magnesium is 1.9 - pt states she is able to swallow pills for electrolyte replacement - will give 40 meq of potassium today.      Intravenous Access:  Implanted Port.    Treatment Conditions:  Lab Results   Component Value Date    HGB 13.3 09/08/2021    WBC 6.4 09/08/2021    ANEU 2.4 08/25/2021    ANEUTAUTO 4.6 09/08/2021     09/08/2021      Lab Results   Component Value Date     (L) 09/08/2021    POTASSIUM 3.2 (L) 09/08/2021    MAG 1.9 09/08/2021    CR 0.62 09/08/2021    RAFAELA 8.9 09/08/2021    BILITOTAL 0.6 09/08/2021    ALBUMIN 3.7 09/08/2021    ALT 23 09/08/2021    AST 25 09/08/2021     Results reviewed, labs MET treatment parameters, ok to proceed with treatment.      Post Infusion Assessment:  Patient tolerated infusion without incident.  Patient observed for 30 minutes post Trodelvy per protocol.  Site patent and intact, free from redness, edema or discomfort.  No evidence of extravasations.  Access discontinued per protocol.       Discharge Plan:   Return on 09/14/2021 for labs and C15,D8 of Trodelvy.  Discharge instructions reviewed with: Patient.  Patient and/or family verbalized understanding of discharge instructions and all questions answered.  Patient discharged in stable condition accompanied by: self.  Departure Mode: Ambulatory.      Loren Costa RN                    "

## 2021-09-08 NOTE — TELEPHONE ENCOUNTER
Shaneka reports feeling back to baseline, headache and vomiting resolved. Pain well controlled currently. She feels comfortable going forward with treatment today and I concur as long as labs are appropriate.

## 2021-09-08 NOTE — PROGRESS NOTES
See IV flow sheet for details of port access. Labs sent: cbc, cmp, magnesium, phosphorus, ca27.29. Port needle left in place for chemotherapy to follow.    Keira Brooks RN

## 2021-09-09 LAB — CANCER AG27-29 SERPL-ACNC: 1176 U/ML (ref 0–39)

## 2021-09-09 NOTE — PROGRESS NOTES
"Shaneka is a 61 year old who is being evaluated via a billable video visit.      How would you like to obtain your AVS? MyChart  If the video visit is dropped, the invitation should be resent by: Text to cell phone: [phone]  Will anyone else be joining your video visit? No    Telephone start Time: 0732    Assessment & Plan     Anxiety  Patient is doing very well all things considered and this needs a refill of her medications.  - venlafaxine (EFFEXOR-XR) 75 MG 24 hr capsule; Take 3 capsules (225 mg) by mouth daily    Major depressive disorder, recurrent episode, moderate (H)  She states that even in the presence of her metastatic breast cancer she is doing well and has no concerns with respect to the medications.  She follows closely with oncology.  - venlafaxine (EFFEXOR-XR) 75 MG 24 hr capsule; Take 3 capsules (225 mg) by mouth daily             BMI:   Estimated body mass index is 25.53 kg/m  as calculated from the following:    Height as of 9/3/21: 1.575 m (5' 2\").    Weight as of 9/8/21: 63.3 kg (139 lb 9.6 oz).           No follow-ups on file.    [facility]    Ajit Munroe is a 61 year old who presents for the following health issues     HPI     Depression and Anxiety Follow-Up    How are you doing with your depression since your last visit? No change    How are you doing with your anxiety since your last visit?  No change    Are you having other symptoms that might be associated with depression or anxiety? No    Have you had a significant life event? Health Concerns     Do you have any concerns with your use of alcohol or other drugs? No    Social History     Tobacco Use     Smoking status: Former Smoker     Types: Cigarettes     Quit date: 12/1/2005     Years since quitting: 15.7     Smokeless tobacco: Never Used   Substance Use Topics     Alcohol use: Not Currently     Alcohol/week: 2.0 standard drinks     Types: 1 Glasses of wine, 1 Cans of beer per week "     Drug use: No     PHQ 7/24/2018 1/25/2021 2/18/2021   PHQ-9 Total Score 6 0 2   Q9: Thoughts of better off dead/self-harm past 2 weeks Not at all Not at all Not at all     BING-7 SCORE 6/17/2016 12/29/2016 2/18/2021   Total Score - - -   Total Score - - 1 (minimal anxiety)   Total Score 0 0 1     Last PHQ-9 9/10/2021   1.  Little interest or pleasure in doing things 1   2.  Feeling down, depressed, or hopeless 0   3.  Trouble falling or staying asleep, or sleeping too much 0   4.  Feeling tired or having little energy 0   5.  Poor appetite or overeating 0   6.  Feeling bad about yourself 0   7.  Trouble concentrating 0   8.  Moving slowly or restless 0   Q9: Thoughts of better off dead/self-harm past 2 weeks 0   PHQ-9 Total Score 1   Difficulty at work, home, or with people Not difficult at all     BING-7  9/10/2021   1. Feeling nervous, anxious, or on edge 0   2. Not being able to stop or control worrying 0   3. Worrying too much about different things 0   4. Trouble relaxing 0   5. Being so restless that it is hard to sit still 0   6. Becoming easily annoyed or irritable 0   7. Feeling afraid, as if something awful might happen 0   BING-7 Total Score 0   If you checked any problems, how difficult have they made it for you to do your work, take care of things at home, or get along with other people? Not difficult at all       Suicide Assessment Five-step Evaluation and Treatment (SAFE-T)      How many servings of fruits and vegetables do you eat daily?  2-3    On average, how many sweetened beverages do you drink each day (Examples: soda, juice, sweet tea, etc.  Do NOT count diet or artificially sweetened beverages)?   0    How many days per week do you exercise enough to make your heart beat faster? 3 or less    How many minutes a day do you exercise enough to make your heart beat faster? 30 - 60    How many days per week do you miss taking your medication? 0      Review of Systems   Constitutional, HEENT,  cardiovascular, pulmonary, GI, , musculoskeletal, neuro, skin, endocrine and psych systems are negative, except as otherwise noted.      Objective             Physical Exam   Telephone visit since video would not work for her.         Telephone-Visit Details    Type of service:  Telephone visit    Telephone end Time:0738    Originating Location (pt. Location): Home    Distant Location (provider location):  [facility]     Platform used for Video Visit: [employer] [employer]   caffeine/street drug/inhalant/medication abuse

## 2021-09-10 ENCOUNTER — VIRTUAL VISIT (OUTPATIENT)
Dept: FAMILY MEDICINE | Facility: OTHER | Age: 61
End: 2021-09-10
Payer: COMMERCIAL

## 2021-09-10 DIAGNOSIS — F41.9 ANXIETY: ICD-10-CM

## 2021-09-10 DIAGNOSIS — F33.1 MAJOR DEPRESSIVE DISORDER, RECURRENT EPISODE, MODERATE (H): ICD-10-CM

## 2021-09-10 PROCEDURE — 96127 BRIEF EMOTIONAL/BEHAV ASSMT: CPT | Mod: 59 | Performed by: PHYSICIAN ASSISTANT

## 2021-09-10 PROCEDURE — 99214 OFFICE O/P EST MOD 30 MIN: CPT | Mod: TEL | Performed by: PHYSICIAN ASSISTANT

## 2021-09-10 RX ORDER — VENLAFAXINE HYDROCHLORIDE 75 MG/1
225 CAPSULE, EXTENDED RELEASE ORAL DAILY
Qty: 270 CAPSULE | Refills: 3 | Status: SHIPPED | OUTPATIENT
Start: 2021-09-10 | End: 2022-11-04

## 2021-09-10 ASSESSMENT — ANXIETY QUESTIONNAIRES
IF YOU CHECKED OFF ANY PROBLEMS ON THIS QUESTIONNAIRE, HOW DIFFICULT HAVE THESE PROBLEMS MADE IT FOR YOU TO DO YOUR WORK, TAKE CARE OF THINGS AT HOME, OR GET ALONG WITH OTHER PEOPLE: NOT DIFFICULT AT ALL
GAD7 TOTAL SCORE: 0
7. FEELING AFRAID AS IF SOMETHING AWFUL MIGHT HAPPEN: NOT AT ALL
5. BEING SO RESTLESS THAT IT IS HARD TO SIT STILL: NOT AT ALL
2. NOT BEING ABLE TO STOP OR CONTROL WORRYING: NOT AT ALL
3. WORRYING TOO MUCH ABOUT DIFFERENT THINGS: NOT AT ALL
1. FEELING NERVOUS, ANXIOUS, OR ON EDGE: NOT AT ALL
6. BECOMING EASILY ANNOYED OR IRRITABLE: NOT AT ALL

## 2021-09-10 ASSESSMENT — PATIENT HEALTH QUESTIONNAIRE - PHQ9
5. POOR APPETITE OR OVEREATING: NOT AT ALL
SUM OF ALL RESPONSES TO PHQ QUESTIONS 1-9: 1

## 2021-09-11 DIAGNOSIS — G47.00 INSOMNIA, UNSPECIFIED TYPE: ICD-10-CM

## 2021-09-11 ASSESSMENT — ANXIETY QUESTIONNAIRES: GAD7 TOTAL SCORE: 0

## 2021-09-13 ENCOUNTER — TELEPHONE (OUTPATIENT)
Dept: ONCOLOGY | Facility: CLINIC | Age: 61
End: 2021-09-13

## 2021-09-13 NOTE — TELEPHONE ENCOUNTER
Patient called complaining of migraine x 3-4 days. She is eating very little- 1/2 cup mac and cheese, 1 protein shake, 1 tbsp rice over the past 2 days. She feels nauseous when standing/walking for short periods of time. She denies vomiting. She has been drinking at least 5- 16 oz bottles of water each day. Has been taking 2 ES tylenol 4 times per day. She continues to take MS Contin and Morphine MSIR twice daily. She denies any vision or hearing changes. She will occasionally  become dizzy when she stands up. Patient inquires if there is something else that she can try to help resolve migraine.    Consulted with Annie Bailon CNP- Recommend alternating tylenol with ibuprofen (2 tabs per dose)). Increase nutritional intake (more protein shakes). Continue with at least 64 oz water daily. May increase morphine MSIR to every 3 hours (as prescribed) to see if this may help with migraine. Plan to have PET scan as scheduled on 9/23, mentions she may also need brain MRI pending PET results.     Writer informed patient of above recommendations. Patient verbalized understanding.

## 2021-09-14 ENCOUNTER — INFUSION THERAPY VISIT (OUTPATIENT)
Dept: INFUSION THERAPY | Facility: CLINIC | Age: 61
End: 2021-09-14
Payer: COMMERCIAL

## 2021-09-14 ENCOUNTER — LAB (OUTPATIENT)
Dept: ONCOLOGY | Facility: CLINIC | Age: 61
End: 2021-09-14
Payer: COMMERCIAL

## 2021-09-14 VITALS
OXYGEN SATURATION: 98 % | RESPIRATION RATE: 18 BRPM | TEMPERATURE: 98.4 F | BODY MASS INDEX: 25 KG/M2 | DIASTOLIC BLOOD PRESSURE: 88 MMHG | WEIGHT: 136.7 LBS | HEART RATE: 124 BPM | SYSTOLIC BLOOD PRESSURE: 125 MMHG

## 2021-09-14 DIAGNOSIS — E87.6 HYPOKALEMIA: ICD-10-CM

## 2021-09-14 DIAGNOSIS — C50.912 BILATERAL MALIGNANT NEOPLASM OF BREAST IN FEMALE, UNSPECIFIED ESTROGEN RECEPTOR STATUS, UNSPECIFIED SITE OF BREAST (H): ICD-10-CM

## 2021-09-14 DIAGNOSIS — T45.1X5A CHEMOTHERAPY-INDUCED NEUTROPENIA (H): ICD-10-CM

## 2021-09-14 DIAGNOSIS — E87.6 HYPOKALEMIA: Primary | ICD-10-CM

## 2021-09-14 DIAGNOSIS — C50.911 BILATERAL MALIGNANT NEOPLASM OF BREAST IN FEMALE, UNSPECIFIED ESTROGEN RECEPTOR STATUS, UNSPECIFIED SITE OF BREAST (H): ICD-10-CM

## 2021-09-14 DIAGNOSIS — C79.51 BONE METASTASES: ICD-10-CM

## 2021-09-14 DIAGNOSIS — C79.51 BONE METASTASES: Primary | ICD-10-CM

## 2021-09-14 DIAGNOSIS — D70.1 CHEMOTHERAPY-INDUCED NEUTROPENIA (H): ICD-10-CM

## 2021-09-14 DIAGNOSIS — C50.919 METASTATIC BREAST CANCER: ICD-10-CM

## 2021-09-14 LAB
ALBUMIN SERPL-MCNC: 3.8 G/DL (ref 3.4–5)
ALP SERPL-CCNC: 57 U/L (ref 40–150)
ALT SERPL W P-5'-P-CCNC: 24 U/L (ref 0–50)
ANION GAP SERPL CALCULATED.3IONS-SCNC: 8 MMOL/L (ref 3–14)
AST SERPL W P-5'-P-CCNC: 14 U/L (ref 0–45)
BASOPHILS # BLD AUTO: 0 10E3/UL (ref 0–0.2)
BASOPHILS NFR BLD AUTO: 1 %
BILIRUB SERPL-MCNC: 0.3 MG/DL (ref 0.2–1.3)
BUN SERPL-MCNC: 18 MG/DL (ref 7–30)
CALCIUM SERPL-MCNC: 9 MG/DL (ref 8.5–10.1)
CHLORIDE BLD-SCNC: 100 MMOL/L (ref 94–109)
CO2 SERPL-SCNC: 27 MMOL/L (ref 20–32)
CREAT SERPL-MCNC: 0.96 MG/DL (ref 0.52–1.04)
EOSINOPHIL # BLD AUTO: 0.1 10E3/UL (ref 0–0.7)
EOSINOPHIL NFR BLD AUTO: 3 %
ERYTHROCYTE [DISTWIDTH] IN BLOOD BY AUTOMATED COUNT: 14 % (ref 10–15)
GFR SERPL CREATININE-BSD FRML MDRD: 65 ML/MIN/1.73M2
GLUCOSE BLD-MCNC: 117 MG/DL (ref 70–99)
HCT VFR BLD AUTO: 39.4 % (ref 35–47)
HGB BLD-MCNC: 13.5 G/DL (ref 11.7–15.7)
IMM GRANULOCYTES # BLD: 0 10E3/UL
IMM GRANULOCYTES NFR BLD: 1 %
LYMPHOCYTES # BLD AUTO: 0.8 10E3/UL (ref 0.8–5.3)
LYMPHOCYTES NFR BLD AUTO: 20 %
MAGNESIUM SERPL-MCNC: 2.1 MG/DL (ref 1.6–2.3)
MCH RBC QN AUTO: 28.3 PG (ref 26.5–33)
MCHC RBC AUTO-ENTMCNC: 34.3 G/DL (ref 31.5–36.5)
MCV RBC AUTO: 83 FL (ref 78–100)
MONOCYTES # BLD AUTO: 0.4 10E3/UL (ref 0–1.3)
MONOCYTES NFR BLD AUTO: 10 %
NEUTROPHILS # BLD AUTO: 2.4 10E3/UL (ref 1.6–8.3)
NEUTROPHILS NFR BLD AUTO: 65 %
NRBC # BLD AUTO: 0 10E3/UL
NRBC BLD AUTO-RTO: 0 /100
PHOSPHATE SERPL-MCNC: 4 MG/DL (ref 2.5–4.5)
PLATELET # BLD AUTO: 273 10E3/UL (ref 150–450)
POTASSIUM BLD-SCNC: 3.2 MMOL/L (ref 3.4–5.3)
PROT SERPL-MCNC: 6.9 G/DL (ref 6.8–8.8)
RBC # BLD AUTO: 4.77 10E6/UL (ref 3.8–5.2)
SODIUM SERPL-SCNC: 135 MMOL/L (ref 133–144)
WBC # BLD AUTO: 3.7 10E3/UL (ref 4–11)

## 2021-09-14 PROCEDURE — 99207 PR NO CHARGE LOS: CPT

## 2021-09-14 PROCEDURE — 84100 ASSAY OF PHOSPHORUS: CPT | Performed by: INTERNAL MEDICINE

## 2021-09-14 PROCEDURE — 85025 COMPLETE CBC W/AUTO DIFF WBC: CPT | Performed by: INTERNAL MEDICINE

## 2021-09-14 PROCEDURE — 96375 TX/PRO/DX INJ NEW DRUG ADDON: CPT | Performed by: NURSE PRACTITIONER

## 2021-09-14 PROCEDURE — 96413 CHEMO IV INFUSION 1 HR: CPT | Performed by: NURSE PRACTITIONER

## 2021-09-14 PROCEDURE — S0028 INJECTION, FAMOTIDINE, 20 MG: HCPCS | Performed by: NURSE PRACTITIONER

## 2021-09-14 PROCEDURE — 83735 ASSAY OF MAGNESIUM: CPT | Performed by: INTERNAL MEDICINE

## 2021-09-14 PROCEDURE — 80053 COMPREHEN METABOLIC PANEL: CPT | Performed by: INTERNAL MEDICINE

## 2021-09-14 PROCEDURE — 96367 TX/PROPH/DG ADDL SEQ IV INF: CPT | Performed by: NURSE PRACTITIONER

## 2021-09-14 PROCEDURE — 96372 THER/PROPH/DIAG INJ SC/IM: CPT | Mod: 59 | Performed by: NURSE PRACTITIONER

## 2021-09-14 RX ORDER — POTASSIUM CHLORIDE 750 MG/1
10 TABLET, EXTENDED RELEASE ORAL DAILY
Qty: 14 TABLET | Refills: 0 | Status: SHIPPED | OUTPATIENT
Start: 2021-09-14 | End: 2021-09-27

## 2021-09-14 RX ORDER — HEPARIN SODIUM (PORCINE) LOCK FLUSH IV SOLN 100 UNIT/ML 100 UNIT/ML
5 SOLUTION INTRAVENOUS
Status: DISCONTINUED | OUTPATIENT
Start: 2021-09-14 | End: 2021-09-17 | Stop reason: HOSPADM

## 2021-09-14 RX ORDER — ACETAMINOPHEN 325 MG/1
650 TABLET ORAL ONCE
Status: COMPLETED | OUTPATIENT
Start: 2021-09-14 | End: 2021-09-14

## 2021-09-14 RX ORDER — DIPHENHYDRAMINE HCL 25 MG
50 CAPSULE ORAL ONCE
Status: COMPLETED | OUTPATIENT
Start: 2021-09-14 | End: 2021-09-14

## 2021-09-14 RX ORDER — HEPARIN SODIUM (PORCINE) LOCK FLUSH IV SOLN 100 UNIT/ML 100 UNIT/ML
5 SOLUTION INTRAVENOUS EVERY 8 HOURS
Status: DISCONTINUED | OUTPATIENT
Start: 2021-09-14 | End: 2021-09-14 | Stop reason: HOSPADM

## 2021-09-14 RX ADMIN — Medication 500 ML: at 10:57

## 2021-09-14 RX ADMIN — HEPARIN SODIUM (PORCINE) LOCK FLUSH IV SOLN 100 UNIT/ML 5 ML: 100 SOLUTION at 10:05

## 2021-09-14 RX ADMIN — HEPARIN SODIUM (PORCINE) LOCK FLUSH IV SOLN 100 UNIT/ML 5 ML: 100 SOLUTION at 13:00

## 2021-09-14 RX ADMIN — ACETAMINOPHEN 650 MG: 325 TABLET ORAL at 10:48

## 2021-09-14 RX ADMIN — Medication 50 MG: at 10:48

## 2021-09-14 ASSESSMENT — PAIN SCALES - GENERAL: PAINLEVEL: NO PAIN (1)

## 2021-09-14 NOTE — PROGRESS NOTES
Port needle left for access for treatment, Sterile technique performed and maintained. Patient tolerated procedure well. Tubes drawn in rainbow order: red, green, purple. Transparent dressing placed with use of tegaderm.    Iveth Durham RN  [facility] [place]

## 2021-09-17 NOTE — PROGRESS NOTES
Infusion Nursing Note:  Shaneka Alvarez presents today for C15D8 Trodelvy/Xgeva.    Patient seen by provider today: No   present during visit today: Not Applicable.    Note: N/A.      Intravenous Access:  Implanted Port.    Treatment Conditions:  Lab Results   Component Value Date    HGB 13.5 09/14/2021    WBC 3.7 (L) 09/14/2021    ANEU 2.4 08/25/2021    ANEUTAUTO 2.4 09/14/2021     09/14/2021      Lab Results   Component Value Date     09/14/2021    POTASSIUM 3.2 (L) 09/14/2021    MAG 2.1 09/14/2021    CR 0.96 09/14/2021    RAFAELA 9.0 09/14/2021    BILITOTAL 0.3 09/14/2021    ALBUMIN 3.8 09/14/2021    ALT 24 09/14/2021    AST 14 09/14/2021     Results reviewed, labs MET treatment parameters, ok to proceed with treatment.      Post Infusion Assessment:  Patient tolerated infusion and injection without incident.       Discharge Plan:    Patient will return 9/28/2021 for next appointment.   Patient discharged in stable condition accompanied by: amita.      Bobbi Contreras RN

## 2021-09-20 DIAGNOSIS — G89.3 CANCER ASSOCIATED PAIN: ICD-10-CM

## 2021-09-20 DIAGNOSIS — C50.919 METASTATIC BREAST CANCER: ICD-10-CM

## 2021-09-20 RX ORDER — MORPHINE SULFATE 15 MG/1
30 TABLET, FILM COATED, EXTENDED RELEASE ORAL EVERY 12 HOURS
Qty: 120 TABLET | Refills: 0 | Status: SHIPPED | OUTPATIENT
Start: 2021-09-20 | End: 2021-11-10

## 2021-09-20 RX ORDER — MORPHINE SULFATE 15 MG/1
15-30 TABLET ORAL
Qty: 120 TABLET | Refills: 0 | Status: SHIPPED | OUTPATIENT
Start: 2021-09-20 | End: 2021-11-10

## 2021-09-20 RX ORDER — TRAZODONE HYDROCHLORIDE 50 MG/1
TABLET, FILM COATED ORAL
Qty: 30 TABLET | Refills: 1 | OUTPATIENT
Start: 2021-09-20

## 2021-09-20 NOTE — TELEPHONE ENCOUNTER
Received "[employer]t message from patient requesting refill of MS IR and MS ER.     Last refill: 8/15/21  Last office visit: 6/28/21  Scheduled for follow up 9/29/21     Will route request to MD for review.     Reviewed [place]  Report.

## 2021-09-21 ENCOUNTER — IMMUNIZATION (OUTPATIENT)
Dept: NURSING | Facility: CLINIC | Age: 61
End: 2021-09-21
Payer: COMMERCIAL

## 2021-09-21 DIAGNOSIS — G47.00 INSOMNIA, UNSPECIFIED TYPE: ICD-10-CM

## 2021-09-21 PROCEDURE — 91300 PR COVID VAC PFIZER DIL RECON 30 MCG/0.3 ML IM: CPT

## 2021-09-21 PROCEDURE — 0003A PR COVID VAC PFIZER DIL RECON 30 MCG/0.3 ML IM: CPT

## 2021-09-21 RX ORDER — TRAZODONE HYDROCHLORIDE 50 MG/1
TABLET, FILM COATED ORAL
Qty: 30 TABLET | Refills: 1 | Status: SHIPPED | OUTPATIENT
Start: 2021-09-21 | End: 2021-12-14

## 2021-09-21 NOTE — TELEPHONE ENCOUNTER
SUBJECTIVE/OBJECTIVE:                                                    Refill request receive for:  Trazodone    Last refill per fax: 7/1/21  Date of LOV r/t Medication: 9/3/21  Future appt scheduled? Yes: 9/28/21   Date faxed to clinic: n/a    RECENT LABS/VITALS                                                      Recent Labs   Lab Test 12/29/16  0946   CHOL 291*   HDL 52   *   TRIG 172*     Lab Results   Component Value Date    AST 14 09/14/2021    AST 21 07/07/2021     Lab Results   Component Value Date    ALT 24 09/14/2021    ALT 33 07/07/2021     Lab Results   Component Value Date    A1C 5.4 12/29/2016     Creatinine   Date Value Ref Range Status   09/14/2021 0.96 0.52 - 1.04 mg/dL Final   07/07/2021 0.69 0.52 - 1.04 mg/dL Final   ]  Potassium   Date Value Ref Range Status   09/14/2021 3.2 (L) 3.4 - 5.3 mmol/L Final   07/07/2021 3.9 3.4 - 5.3 mmol/L Final   ]  TSH   Date Value Ref Range Status   12/08/2014 1.11 mcU/mL Final     BP Readings from Last 4 Encounters:   09/14/21 125/88   09/08/21 125/87   08/17/21 122/84   08/04/21 115/78       PLAN:                                                      Sent to provider to advise.

## 2021-09-24 ENCOUNTER — ANCILLARY PROCEDURE (OUTPATIENT)
Dept: PET IMAGING | Facility: CLINIC | Age: 61
End: 2021-09-24
Attending: NURSE PRACTITIONER
Payer: COMMERCIAL

## 2021-09-24 DIAGNOSIS — C50.919 METASTATIC BREAST CANCER: ICD-10-CM

## 2021-09-24 PROCEDURE — 71260 CT THORAX DX C+: CPT | Mod: 59

## 2021-09-24 PROCEDURE — A9552 F18 FDG: HCPCS

## 2021-09-24 PROCEDURE — 74177 CT ABD & PELVIS W/CONTRAST: CPT | Mod: 59

## 2021-09-24 PROCEDURE — 78816 PET IMAGE W/CT FULL BODY: CPT | Mod: PS

## 2021-09-24 RX ORDER — IOPAMIDOL 755 MG/ML
50-135 INJECTION, SOLUTION INTRAVASCULAR ONCE
Status: COMPLETED | OUTPATIENT
Start: 2021-09-24 | End: 2021-09-24

## 2021-09-24 RX ADMIN — IOPAMIDOL 84 ML: 755 INJECTION, SOLUTION INTRAVASCULAR at 14:28

## 2021-09-26 DIAGNOSIS — E87.6 HYPOKALEMIA: ICD-10-CM

## 2021-09-27 ENCOUNTER — VIRTUAL VISIT (OUTPATIENT)
Dept: ONCOLOGY | Facility: CLINIC | Age: 61
End: 2021-09-27
Attending: INTERNAL MEDICINE
Payer: COMMERCIAL

## 2021-09-27 DIAGNOSIS — G35 MULTIPLE SCLEROSIS (H): ICD-10-CM

## 2021-09-27 DIAGNOSIS — C50.912 BILATERAL MALIGNANT NEOPLASM OF BREAST IN FEMALE, UNSPECIFIED ESTROGEN RECEPTOR STATUS, UNSPECIFIED SITE OF BREAST (H): ICD-10-CM

## 2021-09-27 DIAGNOSIS — C50.911 BILATERAL MALIGNANT NEOPLASM OF BREAST IN FEMALE, UNSPECIFIED ESTROGEN RECEPTOR STATUS, UNSPECIFIED SITE OF BREAST (H): ICD-10-CM

## 2021-09-27 DIAGNOSIS — C50.919 METASTATIC BREAST CANCER: ICD-10-CM

## 2021-09-27 DIAGNOSIS — C79.51 BONE METASTASES: Primary | ICD-10-CM

## 2021-09-27 PROCEDURE — 999N001193 HC VIDEO/TELEPHONE VISIT; NO CHARGE

## 2021-09-27 PROCEDURE — 99214 OFFICE O/P EST MOD 30 MIN: CPT | Mod: GT | Performed by: INTERNAL MEDICINE

## 2021-09-27 RX ORDER — POTASSIUM CHLORIDE 750 MG/1
TABLET, EXTENDED RELEASE ORAL
Qty: 14 TABLET | Refills: 0 | Status: SHIPPED | OUTPATIENT
Start: 2021-09-27 | End: 2021-10-19

## 2021-09-27 NOTE — PROGRESS NOTES
Shaneka is a 61 year old who is being evaluated via a billable video visit.      How would you like to obtain your AVS? Extend [employer]  If the video visit is dropped, the invitation should be resent by: Text to cell phone: [phone]  Will anyone else be joining your video visit? No      Video Time:  25 min    Video-Visit Details    Type of service:  Video Visit     Originating Location (pt. Location): Home    Distant Location (provider location):  [facility]     Platform used for Video Visit: [facility]      ONCOLOGY FOLLOW UP NOTE: September 27, 2021    ONCOLOGY History:    1.  January 2006:  Diagnosed with Stage IIB, T2 N1 M0 invasive lobular carcinoma of the right breast.  Final pathology showed a 4.5 x 3.8 x 2.5 cm, 01/14 lymph nodes positive.  Estrogen, progesterone receptor positive, HER-2 negative.     2.  Genetics.  BRCA1 and 2 mutations not detected. 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       THERAPY TO DATE:   1.  2006:  ECOG 2104 protocol of dose-dense Adriamycin, Cytoxan and Avastin x4 cycles followed by Taxol and Avastin x4 cycles.   2.  03/2007:  Completed 1 year Avastin.   3.  11/2006-01/2007:  Radiotherapy to the right breast of 5040 cGy.   4.  03/[id number]:  Aromasin.  Stopped after moving to the [facility].   5.  01/2016:  Right modified radical mastectomy with latissimus dorsi flap reconstruction and a left prophylactic mastectomy with latissimus dorsi flap reconstruction.   6.She recently had MRI of the brain as a follow-up for multiple sclerosis and there were multiple calvarial lesions noted which was suspicious for metastatic disease.  There was no evidence of new multiple sclerosis lesions.  She had a PET scan on 8/30/2019 which showed widespread bone metastatic lesions (calvarium, spine, sacrum, pelvis, ribs most prominent at C7, T3, L1 and L4), hypermetabolic 3 cm lesion in LLL, and mediastinal/hilar LN. CEA wnl at 1.9 and C27-29 elevated to 415 (28 on 8/23/16).  A CT guided biopsy of the right iliac bone was obtained on 9/4/19, pathology consistent with metastatic adenocarcinoma (breast), ER/UT negative, HER-2 negative PDL 1 < 1%. A second biopsy was take of the LLL nodule via EBUS on 9/5/19 did not show any malignancy.    C/T/L spine MRI 8/3/19 to 9/2/19 with numerous enhancing lesions of the C, T and L spine, largest around T9 and L1. No evidence of cord compression. Has a L1 compression fracture and impending L4.     7.Received radiation T12-L5 3000 cGy in 10 fractions from 9/6/2019 till 9/18/2019.  Neurosurgery recommended no surgical intervention but to wear Gorge brace when out of bed and HOB >30 degrees    8.She started palliative Xeloda October 2019 - November  Because of intolerance to Xeloda we decided on switching to weekly paclitaxel on 11/5/2019.    9. Taxol 12/4/19- started with 70mg/m2 dose reduction     10.We decided on switching to eribulin on 1/28/2020.  11.C#1 Trodelvy on 11/11/2020 through current      In discussion with Shaneka today, she comes in.  She has been having a rising CA 27-29.  She has concerns about progressive disease.    She reports overall that her pain is reasonably well controlled on her current regimen.  She has been seeing Palliative Care.  She is currently on MS Contin 30 mg every 12 hours with breakthrough 15-30 mg every 3 hours as needed.  She reports no nausea, vomiting, diarrhea or constipation.  In discussion with her  today, her biggest complaint is fatigue.  She typically will have 3 good days of every cycle.  Otherwise, is resting in bed and notices significant sort of fatigue.  She does have some nausea.  She is using Zyprexa with her chemotherapy.    No fevers, chills, or infectious symptoms.    She has questions today about next steps regarding her therapy.    She would like to get out and about.  She has had more fatigue recently but continues to remain active.        ECOG 1    ROS:  A 10 point comprehensive ROS was  otherwise neg          I reviewed other history in epic as below.       PAST MEDICAL HISTORY:     1.  Breast cancer  2.  Multiple sclerosis.   3.  Depression.   4.  Migraines.   5.  Hypertension.   6.  Cholecystectomy and umbilical hernia repair.     SOCIAL HISTORY: She smoked for 5 years but quit many years ago.  Drinks alcohol socially.  She lives with her .  She is a teacher in middle school.       FAMILY HISTORY: She mentions that her mother had breast cancer at 49.  Both grandmothers had breast cancer but she is not sure of the age.  A couple of cousins have cancers.  Patient has 3 children who are healthy.    Current Outpatient Medications   Medication     acetaminophen (TYLENOL) 500 MG tablet     albuterol (PROAIR HFA/PROVENTIL HFA/VENTOLIN HFA) 108 (90 Base) MCG/ACT inhaler     busPIRone (BUSPAR) 15 MG tablet     calcium citrate-vitamin D (CITRACAL) 315-250 MG-UNIT TABS     Cholecalciferol (VITAMIN D3 PO)     ELIQUIS ANTICOAGULANT 5 MG tablet     HEMP OIL OR EXTRACT OR OTHER CBD CANNABINOID, NOT MEDICAL CANNABIS,     ibuprofen (ADVIL/MOTRIN) 600 MG tablet     loratadine (CLARITIN) 10 MG tablet     LORazepam (ATIVAN) 1 MG tablet     magnesium 250 MG tablet     morphine (MS CONTIN) 15 MG CR tablet     morphine (MSIR) 15 MG IR tablet     OLANZapine (ZYPREXA) 5 MG tablet     potassium chloride ER (KLOR-CON M) 10 MEQ CR tablet     promethazine (PHENERGAN) 25 MG tablet     propranolol ER (INDERAL LA) 80 MG 24 hr capsule     senna-docusate (SENOKOT-S/PERICOLACE) 8.6-50 MG tablet     traZODone (DESYREL) 50 MG tablet     venlafaxine (EFFEXOR-XR) 75 MG 24 hr capsule     No current facility-administered medications for this visit.        Allergies   Allergen Reactions     Bactrim [Sulfamethoxazole W/Trimethoprim]      Internal bleeding     Copaxone [Glatiramer] Hives          PHYSICAL EXAMINATION:     There were no vitals taken for this visit.  Wt Readings from Last 4 Encounters:   09/14/21 62 kg (136 lb 11.2  oz)   09/08/21 63.3 kg (139 lb 9.6 oz)   09/03/21 68 kg (150 lb)   08/25/21 68 kg (150 lb)     Constitutional.  Looks well and in no apparent distress.   Eyes.  Without eye redness or apparent jaundice.   Respiratory.  Non labored breathing. Speaking in full sentences.    Skin.  No concerning skin rashes on the skin visualized.   Neurological.  Is alert and oriented.  Psychiatric.  Mood and affect seem appropriate.      The rest of a comprehensive physical examination is deferred due to Public Health Emergency video visit restrictions.      Labs and Imaging.    We reviewed her recent imaging demonstrating stable disease.  CA 2729 rising to 1100    ASSESSMENT AND PLAN:   1.  History of stage IIB, T2 N1 M0 invasive lobular carcinoma of the right breast.  It was initially diagnosed in 2006 and at that time it was hormone receptor positive, HER-2 negative.  She was treated on ECOG 2104 protocol with dose-dense Adriamycin, Cytoxan and Avastin x4 cycles followed by Taxol and Avastin x4 cycles followed by a Avastin for 1 year.  She also received radiation to the right breast which she completed in January 2007 (5040 cGy).  She took Aromasin from 2007 to 2014.    Now she has metastatic breast cancer with extensive involvement of the bones.  There is also a left lower lobe hypermetabolic lesion and mediastinal lymph nodes which are hypermetabolic.  The biopsy from the right iliac bone is consistent with metastatic lobular breast cancer but it is hormone receptor and HER-2 negative and PDL 1 is also negative.    She has received 3000 cGy of palliative radiation to T12-L5 from 9/6/2019 till 9/18/2019.  She has been on Xeloda, taxol, eribulin, and now on Trodelvy, and is tolerating this reasonably well with a good response to the treatment.                She obtained a second opinion from Dr. Castillo from [facility] who recommended a repeat biopsy to be done to make sure that she really has triple negative breast  cancer.  She had repeat biopsy done from the right acetabulum on 2/25/2021 which showed metastatic lobular breast cancer which confirmed triple negative breast cancer, without PDL1 expression.     Foundation one showed CDH1 mutation (initially lobular cancer), CCND1 amplification. FGF3, FGF4, FGF19 amplification which all are potentially targetable. Most promising is CCND1 amplification with abemaciclib showing response in 4/10 patients. FGF3,FGF4 and FGF19 are targetable with pan-FGFR inhibitors.      We discussed she is androgen receptor positive.  Bicalutaide 150 mg daily can be considered as per PMID [id number].      She will have tumor markers checked tomorrow.  If these are rising, we discussed considering other therapies including clinical trials with FGFR inhibitors.  Dr Mejia can see the patient this week in the [facility].  If her markers are rising and her fatigue has increased that much, it would be reasonable to switch her treatment.  Alternatively, we could continue sacituzumab.and Xgeva.        Nausea vomiting.  This was under good control this time and we will continue with Emend Decadron and Zofran.  She will continue takes Compazine.      Bone disease.  She has metastatic disease to the bone.  Previously she got palliative radiation to T12-L5 3000 cGy in 10 fractions from 9/6/2019 till 9/18/2019.  She was evaluated by orthopedics Dr. Bipin Elliott on 11/1/2019 and conservative management was recommended.  She was on Zometa every 3 months and is now on Xgeva which she received   on 2/17/2021.  Continue calcium and vitamin D.  Continue Xgeva monthly.    Pain management.  She is following with palliative care.  Continue morphine sulfate and MS Contin.     Bilateral pulmonary emboli.  Continue Eliquis for incidentally found PE on 7/17/2020.     Neuropathy.  She has noticed slightly worse neuropathy in the hands.  Continue to observe closely.  She has underlying chronic balance issues and heat and cold  sensitivity from multiple sclerosis.         Arelis Arshad MD

## 2021-09-27 NOTE — LETTER
9/27/2021         RE: Shaneka Alvarez  [id number] [facility] [address]        Dear Colleague,    Thank you for referring your patient, Shaneka Alvarez, to the [facility]. Please see a copy of my visit note below.    Shaneka is a 61 year old who is being evaluated via a billable video visit.      How would you like to obtain your AVS? MyChart  If the video visit is dropped, the invitation should be resent by: Text to cell phone: [phone]  Will anyone else be joining your video visit? No      Video Time:  25 min    Video-Visit Details    Type of service:  Video Visit     Originating Location (pt. Location): Home    Distant Location (provider location):  [facility]     Platform used for Video Visit: [email or web address]      ONCOLOGY FOLLOW UP NOTE: September 27, 2021    ONCOLOGY History:    1.  January 2006:  Diagnosed with Stage IIB, T2 N1 M0 invasive lobular carcinoma of the right breast.  Final pathology showed a 4.5 x 3.8 x 2.5 cm, 01/14 lymph nodes positive.  Estrogen, progesterone receptor positive, HER-2 negative.     2.  Genetics.  BRCA1 and 2 mutations not detected. 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       THERAPY TO DATE:   1. 2006:  ECOG 2104 protocol of dose-dense Adriamycin, Cytoxan and Avastin x4 cycles followed by Taxol and Avastin x4 cycles.   2.  03/2007:  Completed 1 year Avastin.   3.  11/2006-01/2007:  Radiotherapy to the right breast of 5040 cGy.   4.  03/[id number]:  Aromasin.  Stopped after moving to the [facility].   5.  01/2016:  Right modified radical mastectomy with latissimus dorsi flap reconstruction and a left prophylactic mastectomy with latissimus dorsi flap reconstruction.   6.She recently had MRI of the brain as a follow-up for multiple sclerosis and there were multiple calvarial lesions noted which was suspicious for metastatic disease.  There was no evidence of new multiple sclerosis lesions.  She had a PET scan  on 8/30/2019 which showed widespread bone metastatic lesions (calvarium, spine, sacrum, pelvis, ribs most prominent at C7, T3, L1 and L4), hypermetabolic 3 cm lesion in LLL, and mediastinal/hilar LN. CEA wnl at 1.9 and C27-29 elevated to 415 (28 on 8/23/16). A CT guided biopsy of the right iliac bone was obtained on 9/4/19, pathology consistent with metastatic adenocarcinoma (breast), ER/IL negative, HER-2 negative PDL 1 < 1%. A second biopsy was take of the LLL nodule via EBUS on 9/5/19 did not show any malignancy.    C/T/L spine MRI 8/3/19 to 9/2/19 with numerous enhancing lesions of the C, T and L spine, largest around T9 and L1. No evidence of cord compression. Has a L1 compression fracture and impending L4.     7.Received radiation T12-L5 3000 cGy in 10 fractions from 9/6/2019 till 9/18/2019.  Neurosurgery recommended no surgical intervention but to wear Gorge brace when out of bed and HOB >30 degrees    8.She started palliative Xeloda October 2019 - November  Because of intolerance to Xeloda we decided on switching to weekly paclitaxel on 11/5/2019.    9. Taxol 12/4/19- started with 70mg/m2 dose reduction     10.We decided on switching to eribulin on 1/28/2020.  11.C#1 Trodelvy on 11/11/2020 through current      In discussion with Shaneka today, she comes in.  She has been having a rising CA 27-29.  She has concerns about progressive disease.    She reports overall that her pain is reasonably well controlled on her current regimen.  She has been seeing Palliative Care.  She is currently on MS Contin 30 mg every 12 hours with breakthrough 15-30 mg every 3 hours as needed.  She reports no nausea, vomiting, diarrhea or constipation.  In discussion with her  today, her biggest complaint is fatigue.  She typically will have 3 good days of every cycle.  Otherwise, is resting in bed and notices significant sort of fatigue.  She does have some nausea.  She is using Zyprexa with her chemotherapy.    No fevers,  chills, or infectious symptoms.    She has questions today about next steps regarding her therapy.    She would like to get out and about.  She has had more fatigue recently but continues to remain active.        ECOG 1    ROS:  A 10 point comprehensive ROS was otherwise neg          I reviewed other history in epic as below.       PAST MEDICAL HISTORY:     1.  Breast cancer  2.  Multiple sclerosis.   3.  Depression.   4.  Migraines.   5.  Hypertension.   6.  Cholecystectomy and umbilical hernia repair.     SOCIAL HISTORY: She smoked for 5 years but quit many years ago.  Drinks alcohol socially.  She lives with her .  She is a teacher in middle school.       FAMILY HISTORY: She mentions that her mother had breast cancer at 49.  Both grandmothers had breast cancer but she is not sure of the age.  A couple of cousins have cancers.  Patient has 3 children who are healthy.    Current Outpatient Medications   Medication     acetaminophen (TYLENOL) 500 MG tablet     albuterol (PROAIR HFA/PROVENTIL HFA/VENTOLIN HFA) 108 (90 Base) MCG/ACT inhaler     busPIRone (BUSPAR) 15 MG tablet     calcium citrate-vitamin D (CITRACAL) 315-250 MG-UNIT TABS     Cholecalciferol (VITAMIN D3 PO)     ELIQUIS ANTICOAGULANT 5 MG tablet     HEMP OIL OR EXTRACT OR OTHER CBD CANNABINOID, NOT MEDICAL CANNABIS,     ibuprofen (ADVIL/MOTRIN) 600 MG tablet     loratadine (CLARITIN) 10 MG tablet     LORazepam (ATIVAN) 1 MG tablet     magnesium 250 MG tablet     morphine (MS CONTIN) 15 MG CR tablet     morphine (MSIR) 15 MG IR tablet     OLANZapine (ZYPREXA) 5 MG tablet     potassium chloride ER (KLOR-CON M) 10 MEQ CR tablet     promethazine (PHENERGAN) 25 MG tablet     propranolol ER (INDERAL LA) 80 MG 24 hr capsule     senna-docusate (SENOKOT-S/PERICOLACE) 8.6-50 MG tablet     traZODone (DESYREL) 50 MG tablet     venlafaxine (EFFEXOR-XR) 75 MG 24 hr capsule     No current facility-administered medications for this visit.        Allergies    Allergen Reactions     Bactrim [Sulfamethoxazole W/Trimethoprim]      Internal bleeding     Copaxone [Glatiramer] Hives          PHYSICAL EXAMINATION:     There were no vitals taken for this visit.  Wt Readings from Last 4 Encounters:   09/14/21 62 kg (136 lb 11.2 oz)   09/08/21 63.3 kg (139 lb 9.6 oz)   09/03/21 68 kg (150 lb)   08/25/21 68 kg (150 lb)     Constitutional.  Looks well and in no apparent distress.   Eyes.  Without eye redness or apparent jaundice.   Respiratory.  Non labored breathing. Speaking in full sentences.    Skin.  No concerning skin rashes on the skin visualized.   Neurological.  Is alert and oriented.  Psychiatric.  Mood and affect seem appropriate.      The rest of a comprehensive physical examination is deferred due to Public Health Emergency video visit restrictions.      Labs and Imaging.    We reviewed her recent imaging demonstrating stable disease.  CA 2729 rising to 1100    ASSESSMENT AND PLAN:   1.  History of stage IIB, T2 N1 M0 invasive lobular carcinoma of the right breast.  It was initially diagnosed in 2006 and at that time it was hormone receptor positive, HER-2 negative.  She was treated on ECOG 2104 protocol with dose-dense Adriamycin, Cytoxan and Avastin x4 cycles followed by Taxol and Avastin x4 cycles followed by a Avastin for 1 year.  She also received radiation to the right breast which she completed in January 2007 (5040 cGy).  She took Aromasin from 2007 to 2014.    Now she has metastatic breast cancer with extensive involvement of the bones.  There is also a left lower lobe hypermetabolic lesion and mediastinal lymph nodes which are hypermetabolic.  The biopsy from the right iliac bone is consistent with metastatic lobular breast cancer but it is hormone receptor and HER-2 negative and PDL 1 is also negative.    She has received 3000 cGy of palliative radiation to T12-L5 from 9/6/2019 till 9/18/2019.  She has been on Xeloda, taxol, eribulin, and now on Trodelvy,  and is tolerating this reasonably well with a good response to the treatment.                She obtained a second opinion from Dr. Castillo from [facility] who recommended a repeat biopsy to be done to make sure that she really has triple negative breast cancer.  She had repeat biopsy done from the right acetabulum on 2/25/2021 which showed metastatic lobular breast cancer which confirmed triple negative breast cancer, without PDL1 expression.     Foundation one showed CDH1 mutation (initially lobular cancer), CCND1 amplification. FGF3, FGF4, FGF19 amplification which all are potentially targetable. Most promising is CCND1 amplification with abemaciclib showing response in 4/10 patients. FGF3,FGF4 and FGF19 are targetable with pan-FGFR inhibitors.      We discussed she is androgen receptor positive.  Bicalutaide 150 mg daily can be considered as per PMID [id number].      She will have tumor markers checked tomorrow.  If these are rising, we discussed considering other therapies including clinical trials with FGFR inhibitors.  Dr Mejia can see the patient this week in the [facility].  If her markers are rising and her fatigue has increased that much, it would be reasonable to switch her treatment.  Alternatively, we could continue sacituzumab.and Xgeva.        Nausea vomiting.  This was under good control this time and we will continue with Emend Decadron and Zofran.  She will continue takes Compazine.      Bone disease.  She has metastatic disease to the bone.  Previously she got palliative radiation to T12-L5 3000 cGy in 10 fractions from 9/6/2019 till 9/18/2019.  She was evaluated by orthopedics Dr. Bipin Elliott on 11/1/2019 and conservative management was recommended.  She was on Zometa every 3 months and is now on Xgeva which she received   on 2/17/2021.  Continue calcium and vitamin D.  Continue Xgeva monthly.    Pain management.  She is following with palliative care.  Continue morphine sulfate and  MS Contin.     Bilateral pulmonary emboli.  Continue Eliquis for incidentally found PE on 7/17/2020.     Neuropathy.  She has noticed slightly worse neuropathy in the hands.  Continue to observe closely.  She has underlying chronic balance issues and heat and cold sensitivity from multiple sclerosis.         Arelis Arshad MD

## 2021-09-28 ENCOUNTER — INFUSION THERAPY VISIT (OUTPATIENT)
Dept: INFUSION THERAPY | Facility: CLINIC | Age: 61
End: 2021-09-28
Payer: COMMERCIAL

## 2021-09-28 ENCOUNTER — VIRTUAL VISIT (OUTPATIENT)
Dept: ONCOLOGY | Facility: CLINIC | Age: 61
End: 2021-09-28
Attending: NURSE PRACTITIONER
Payer: COMMERCIAL

## 2021-09-28 ENCOUNTER — LAB (OUTPATIENT)
Dept: ONCOLOGY | Facility: CLINIC | Age: 61
End: 2021-09-28
Payer: COMMERCIAL

## 2021-09-28 VITALS
WEIGHT: 144.4 LBS | HEART RATE: 75 BPM | SYSTOLIC BLOOD PRESSURE: 114 MMHG | HEIGHT: 62 IN | BODY MASS INDEX: 26.57 KG/M2 | TEMPERATURE: 98 F | DIASTOLIC BLOOD PRESSURE: 79 MMHG | OXYGEN SATURATION: 100 %

## 2021-09-28 DIAGNOSIS — C50.919 METASTATIC BREAST CANCER: ICD-10-CM

## 2021-09-28 DIAGNOSIS — C50.912 BILATERAL MALIGNANT NEOPLASM OF BREAST IN FEMALE, UNSPECIFIED ESTROGEN RECEPTOR STATUS, UNSPECIFIED SITE OF BREAST (H): ICD-10-CM

## 2021-09-28 DIAGNOSIS — E87.6 HYPOKALEMIA: Primary | ICD-10-CM

## 2021-09-28 DIAGNOSIS — D70.1 CHEMOTHERAPY-INDUCED NEUTROPENIA (H): ICD-10-CM

## 2021-09-28 DIAGNOSIS — C50.911 BILATERAL MALIGNANT NEOPLASM OF BREAST IN FEMALE, UNSPECIFIED ESTROGEN RECEPTOR STATUS, UNSPECIFIED SITE OF BREAST (H): ICD-10-CM

## 2021-09-28 DIAGNOSIS — D64.9 ANEMIA, UNSPECIFIED TYPE: ICD-10-CM

## 2021-09-28 DIAGNOSIS — T45.1X5A CHEMOTHERAPY-INDUCED NEUTROPENIA (H): ICD-10-CM

## 2021-09-28 DIAGNOSIS — D64.9 ANEMIA, UNSPECIFIED TYPE: Primary | ICD-10-CM

## 2021-09-28 DIAGNOSIS — G35 MULTIPLE SCLEROSIS (H): ICD-10-CM

## 2021-09-28 DIAGNOSIS — C79.51 BONE METASTASES: ICD-10-CM

## 2021-09-28 DIAGNOSIS — R11.2 NON-INTRACTABLE VOMITING WITH NAUSEA, UNSPECIFIED VOMITING TYPE: ICD-10-CM

## 2021-09-28 DIAGNOSIS — I26.99 PULMONARY EMBOLISM WITHOUT ACUTE COR PULMONALE, UNSPECIFIED CHRONICITY, UNSPECIFIED PULMONARY EMBOLISM TYPE (H): ICD-10-CM

## 2021-09-28 DIAGNOSIS — G89.3 CANCER ASSOCIATED PAIN: ICD-10-CM

## 2021-09-28 LAB
ALBUMIN SERPL-MCNC: 3.1 G/DL (ref 3.4–5)
ALP SERPL-CCNC: 77 U/L (ref 40–150)
ALT SERPL W P-5'-P-CCNC: 42 U/L (ref 0–50)
ANION GAP SERPL CALCULATED.3IONS-SCNC: 7 MMOL/L (ref 3–14)
AST SERPL W P-5'-P-CCNC: 20 U/L (ref 0–45)
BASOPHILS # BLD MANUAL: 0 10E3/UL (ref 0–0.2)
BASOPHILS NFR BLD MANUAL: 1 %
BILIRUB SERPL-MCNC: 0.2 MG/DL (ref 0.2–1.3)
BUN SERPL-MCNC: 9 MG/DL (ref 7–30)
CALCIUM SERPL-MCNC: 8.6 MG/DL (ref 8.5–10.1)
CHLORIDE BLD-SCNC: 105 MMOL/L (ref 94–109)
CO2 SERPL-SCNC: 28 MMOL/L (ref 20–32)
CREAT SERPL-MCNC: 0.68 MG/DL (ref 0.52–1.04)
EOSINOPHIL # BLD MANUAL: 0.1 10E3/UL (ref 0–0.7)
EOSINOPHIL NFR BLD MANUAL: 4 %
ERYTHROCYTE [DISTWIDTH] IN BLOOD BY AUTOMATED COUNT: 14.9 % (ref 10–15)
FERRITIN SERPL-MCNC: 101 NG/ML (ref 8–252)
FOLATE SERPL-MCNC: 9.1 NG/ML
GFR SERPL CREATININE-BSD FRML MDRD: >90 ML/MIN/1.73M2
GLUCOSE BLD-MCNC: 129 MG/DL (ref 70–99)
HCT VFR BLD AUTO: 31.8 % (ref 35–47)
HGB BLD-MCNC: 10.5 G/DL (ref 11.7–15.7)
IRON SATN MFR SERPL: 19 % (ref 15–46)
IRON SERPL-MCNC: 51 UG/DL (ref 35–180)
LYMPHOCYTES # BLD MANUAL: 1 10E3/UL (ref 0.8–5.3)
LYMPHOCYTES NFR BLD MANUAL: 32 %
MAGNESIUM SERPL-MCNC: 1.9 MG/DL (ref 1.6–2.3)
MCH RBC QN AUTO: 28.8 PG (ref 26.5–33)
MCHC RBC AUTO-ENTMCNC: 33 G/DL (ref 31.5–36.5)
MCV RBC AUTO: 87 FL (ref 78–100)
MONOCYTES # BLD MANUAL: 0.2 10E3/UL (ref 0–1.3)
MONOCYTES NFR BLD MANUAL: 7 %
NEUTROPHILS # BLD MANUAL: 1.7 10E3/UL (ref 1.6–8.3)
NEUTROPHILS NFR BLD MANUAL: 56 %
PHOSPHATE SERPL-MCNC: 4 MG/DL (ref 2.5–4.5)
PLAT MORPH BLD: ABNORMAL
PLATELET # BLD AUTO: 210 10E3/UL (ref 150–450)
POTASSIUM BLD-SCNC: 3.7 MMOL/L (ref 3.4–5.3)
PROT SERPL-MCNC: 6.1 G/DL (ref 6.8–8.8)
RBC # BLD AUTO: 3.64 10E6/UL (ref 3.8–5.2)
RBC MORPH BLD: ABNORMAL
SODIUM SERPL-SCNC: 140 MMOL/L (ref 133–144)
TIBC SERPL-MCNC: 268 UG/DL (ref 240–430)
VARIANT LYMPHS BLD QL SMEAR: PRESENT
WBC # BLD AUTO: 3 10E3/UL (ref 4–11)

## 2021-09-28 PROCEDURE — 85027 COMPLETE CBC AUTOMATED: CPT | Performed by: INTERNAL MEDICINE

## 2021-09-28 PROCEDURE — 86300 IMMUNOASSAY TUMOR CA 15-3: CPT | Performed by: INTERNAL MEDICINE

## 2021-09-28 PROCEDURE — 99000 SPECIMEN HANDLING OFFICE-LAB: CPT | Performed by: INTERNAL MEDICINE

## 2021-09-28 PROCEDURE — 82746 ASSAY OF FOLIC ACID SERUM: CPT | Mod: 90 | Performed by: INTERNAL MEDICINE

## 2021-09-28 PROCEDURE — S0028 INJECTION, FAMOTIDINE, 20 MG: HCPCS | Performed by: NURSE PRACTITIONER

## 2021-09-28 PROCEDURE — 99207 PR NO CHARGE LOS: CPT

## 2021-09-28 PROCEDURE — 82668 ASSAY OF ERYTHROPOIETIN: CPT | Mod: 90 | Performed by: INTERNAL MEDICINE

## 2021-09-28 PROCEDURE — 84100 ASSAY OF PHOSPHORUS: CPT | Performed by: INTERNAL MEDICINE

## 2021-09-28 PROCEDURE — 96413 CHEMO IV INFUSION 1 HR: CPT | Performed by: NURSE PRACTITIONER

## 2021-09-28 PROCEDURE — 99214 OFFICE O/P EST MOD 30 MIN: CPT | Mod: GT | Performed by: INTERNAL MEDICINE

## 2021-09-28 PROCEDURE — 82728 ASSAY OF FERRITIN: CPT | Performed by: INTERNAL MEDICINE

## 2021-09-28 PROCEDURE — 83550 IRON BINDING TEST: CPT | Performed by: INTERNAL MEDICINE

## 2021-09-28 PROCEDURE — 96375 TX/PRO/DX INJ NEW DRUG ADDON: CPT | Performed by: NURSE PRACTITIONER

## 2021-09-28 PROCEDURE — 80053 COMPREHEN METABOLIC PANEL: CPT | Performed by: INTERNAL MEDICINE

## 2021-09-28 PROCEDURE — 83735 ASSAY OF MAGNESIUM: CPT | Performed by: INTERNAL MEDICINE

## 2021-09-28 PROCEDURE — 82607 VITAMIN B-12: CPT | Performed by: INTERNAL MEDICINE

## 2021-09-28 PROCEDURE — 96367 TX/PROPH/DG ADDL SEQ IV INF: CPT | Performed by: NURSE PRACTITIONER

## 2021-09-28 RX ORDER — DIPHENHYDRAMINE HCL 25 MG
50 CAPSULE ORAL ONCE
Status: COMPLETED | OUTPATIENT
Start: 2021-09-28 | End: 2021-09-28

## 2021-09-28 RX ORDER — ALBUTEROL SULFATE 0.83 MG/ML
2.5 SOLUTION RESPIRATORY (INHALATION)
Status: CANCELLED | OUTPATIENT
Start: 2021-09-28

## 2021-09-28 RX ORDER — DIPHENHYDRAMINE HYDROCHLORIDE 50 MG/ML
50 INJECTION INTRAMUSCULAR; INTRAVENOUS
Status: CANCELLED
Start: 2021-10-05

## 2021-09-28 RX ORDER — HEPARIN SODIUM (PORCINE) LOCK FLUSH IV SOLN 100 UNIT/ML 100 UNIT/ML
5 SOLUTION INTRAVENOUS
Status: CANCELLED | OUTPATIENT
Start: 2021-10-05

## 2021-09-28 RX ORDER — ALBUTEROL SULFATE 90 UG/1
1-2 AEROSOL, METERED RESPIRATORY (INHALATION)
Status: CANCELLED
Start: 2021-10-05

## 2021-09-28 RX ORDER — SODIUM CHLORIDE 9 MG/ML
1000 INJECTION, SOLUTION INTRAVENOUS CONTINUOUS PRN
Status: CANCELLED
Start: 2021-10-05

## 2021-09-28 RX ORDER — LORAZEPAM 2 MG/ML
0.5 INJECTION INTRAMUSCULAR EVERY 4 HOURS PRN
Status: CANCELLED
Start: 2021-09-28

## 2021-09-28 RX ORDER — HEPARIN SODIUM (PORCINE) LOCK FLUSH IV SOLN 100 UNIT/ML 100 UNIT/ML
5 SOLUTION INTRAVENOUS
Status: CANCELLED | OUTPATIENT
Start: 2021-09-28

## 2021-09-28 RX ORDER — ALBUTEROL SULFATE 90 UG/1
1-2 AEROSOL, METERED RESPIRATORY (INHALATION)
Status: CANCELLED
Start: 2021-09-28

## 2021-09-28 RX ORDER — DIPHENHYDRAMINE HCL 25 MG
50 CAPSULE ORAL ONCE
Status: CANCELLED | OUTPATIENT
Start: 2021-10-05

## 2021-09-28 RX ORDER — ACETAMINOPHEN 325 MG/1
650 TABLET ORAL ONCE
Status: COMPLETED | OUTPATIENT
Start: 2021-09-28 | End: 2021-09-28

## 2021-09-28 RX ORDER — METHYLPREDNISOLONE SODIUM SUCCINATE 125 MG/2ML
125 INJECTION, POWDER, LYOPHILIZED, FOR SOLUTION INTRAMUSCULAR; INTRAVENOUS
Status: CANCELLED
Start: 2021-10-05

## 2021-09-28 RX ORDER — HEPARIN SODIUM,PORCINE 10 UNIT/ML
5 VIAL (ML) INTRAVENOUS
Status: CANCELLED | OUTPATIENT
Start: 2021-09-28

## 2021-09-28 RX ORDER — HEPARIN SODIUM (PORCINE) LOCK FLUSH IV SOLN 100 UNIT/ML 100 UNIT/ML
500 SOLUTION INTRAVENOUS ONCE
Status: COMPLETED | OUTPATIENT
Start: 2021-09-28 | End: 2021-09-28

## 2021-09-28 RX ORDER — ACETAMINOPHEN 325 MG/1
650 TABLET ORAL ONCE
Status: CANCELLED | OUTPATIENT
Start: 2021-10-05

## 2021-09-28 RX ORDER — ALBUTEROL SULFATE 0.83 MG/ML
2.5 SOLUTION RESPIRATORY (INHALATION)
Status: CANCELLED | OUTPATIENT
Start: 2021-10-05

## 2021-09-28 RX ORDER — EPINEPHRINE 1 MG/ML
0.3 INJECTION, SOLUTION INTRAMUSCULAR; SUBCUTANEOUS EVERY 5 MIN PRN
Status: CANCELLED | OUTPATIENT
Start: 2021-09-28

## 2021-09-28 RX ORDER — MEPERIDINE HYDROCHLORIDE 25 MG/ML
25 INJECTION INTRAMUSCULAR; INTRAVENOUS; SUBCUTANEOUS EVERY 30 MIN PRN
Status: CANCELLED | OUTPATIENT
Start: 2021-09-28

## 2021-09-28 RX ORDER — ATROPINE SULFATE 0.4 MG/ML
0.4 AMPUL (ML) INJECTION
Status: CANCELLED | OUTPATIENT
Start: 2021-10-05

## 2021-09-28 RX ORDER — NALOXONE HYDROCHLORIDE 0.4 MG/ML
.1-.4 INJECTION, SOLUTION INTRAMUSCULAR; INTRAVENOUS; SUBCUTANEOUS
Status: CANCELLED | OUTPATIENT
Start: 2021-10-05

## 2021-09-28 RX ORDER — ACETAMINOPHEN 325 MG/1
650 TABLET ORAL ONCE
Status: CANCELLED | OUTPATIENT
Start: 2021-09-28

## 2021-09-28 RX ORDER — MEPERIDINE HYDROCHLORIDE 25 MG/ML
25 INJECTION INTRAMUSCULAR; INTRAVENOUS; SUBCUTANEOUS EVERY 30 MIN PRN
Status: CANCELLED | OUTPATIENT
Start: 2021-10-05

## 2021-09-28 RX ORDER — ATROPINE SULFATE 0.4 MG/ML
0.4 AMPUL (ML) INJECTION
Status: CANCELLED | OUTPATIENT
Start: 2021-09-28

## 2021-09-28 RX ORDER — DIPHENHYDRAMINE HYDROCHLORIDE 50 MG/ML
50 INJECTION INTRAMUSCULAR; INTRAVENOUS
Status: CANCELLED
Start: 2021-09-28

## 2021-09-28 RX ORDER — HEPARIN SODIUM (PORCINE) LOCK FLUSH IV SOLN 100 UNIT/ML 100 UNIT/ML
5 SOLUTION INTRAVENOUS
Status: DISCONTINUED | OUTPATIENT
Start: 2021-09-28 | End: 2021-09-28 | Stop reason: HOSPADM

## 2021-09-28 RX ORDER — DIPHENHYDRAMINE HCL 25 MG
50 CAPSULE ORAL ONCE
Status: CANCELLED | OUTPATIENT
Start: 2021-09-28

## 2021-09-28 RX ORDER — METHYLPREDNISOLONE SODIUM SUCCINATE 125 MG/2ML
125 INJECTION, POWDER, LYOPHILIZED, FOR SOLUTION INTRAMUSCULAR; INTRAVENOUS
Status: CANCELLED
Start: 2021-09-28

## 2021-09-28 RX ORDER — SODIUM CHLORIDE 9 MG/ML
1000 INJECTION, SOLUTION INTRAVENOUS CONTINUOUS PRN
Status: CANCELLED
Start: 2021-09-28

## 2021-09-28 RX ORDER — LORAZEPAM 2 MG/ML
0.5 INJECTION INTRAMUSCULAR EVERY 4 HOURS PRN
Status: CANCELLED
Start: 2021-10-05

## 2021-09-28 RX ORDER — HEPARIN SODIUM,PORCINE 10 UNIT/ML
5 VIAL (ML) INTRAVENOUS
Status: CANCELLED | OUTPATIENT
Start: 2021-10-05

## 2021-09-28 RX ORDER — NALOXONE HYDROCHLORIDE 0.4 MG/ML
.1-.4 INJECTION, SOLUTION INTRAMUSCULAR; INTRAVENOUS; SUBCUTANEOUS
Status: CANCELLED | OUTPATIENT
Start: 2021-09-28

## 2021-09-28 RX ORDER — EPINEPHRINE 1 MG/ML
0.3 INJECTION, SOLUTION INTRAMUSCULAR; SUBCUTANEOUS EVERY 5 MIN PRN
Status: CANCELLED | OUTPATIENT
Start: 2021-10-05

## 2021-09-28 RX ADMIN — Medication 500 ML: at 08:34

## 2021-09-28 RX ADMIN — HEPARIN SODIUM (PORCINE) LOCK FLUSH IV SOLN 100 UNIT/ML 5 ML: 100 SOLUTION at 11:36

## 2021-09-28 RX ADMIN — HEPARIN SODIUM (PORCINE) LOCK FLUSH IV SOLN 100 UNIT/ML 500 UNITS: 100 SOLUTION at 07:25

## 2021-09-28 RX ADMIN — ACETAMINOPHEN 650 MG: 325 TABLET ORAL at 08:33

## 2021-09-28 RX ADMIN — Medication 50 MG: at 08:33

## 2021-09-28 ASSESSMENT — PAIN SCALES - GENERAL: PAINLEVEL: NO PAIN (0)

## 2021-09-28 ASSESSMENT — MIFFLIN-ST. JEOR: SCORE: 1183.24

## 2021-09-28 NOTE — LETTER
9/28/2021         RE: Shaneka Alvarez  [address] [facility] [address]        Dear Colleague,    Thank you for referring your patient, Shaneka Alvarez, to the [facility]. Please see a copy of my visit note below.      ONCOLOGY FOLLOW UP NOTE: 9/28/21        I took the history from reviewing the previous notes that she was following with Dr. Amaya.  I have copied and updated from prior notes.    ONCOLOGY History:    1.  January 2006:  Diagnosed with Stage IIB, T2 N1 M0 invasive lobular carcinoma of the right breast.  Final pathology showed a 4.5 x 3.8 x 2.5 cm, 01/14 lymph nodes positive.  Estrogen, progesterone receptor positive, HER-2 negative.     2.  Genetics.  BRCA1 and 2 mutations not detected. 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       THERAPY TO DATE:   1. 2006:  ECOG 2104 protocol of dose-dense Adriamycin, Cytoxan and Avastin x4 cycles followed by Taxol and Avastin x4 cycles.   2.  03/2007:  Completed 1 year Avastin.   3.  11/2006-01/2007:  Radiotherapy to the right breast of 5040 cGy.   4.  03/[id number]:  Aromasin.  Stopped after moving to the [facility].   5.  01/2016:  Right modified radical mastectomy with latissimus dorsi flap reconstruction and a left prophylactic mastectomy with latissimus dorsi flap reconstruction.     She recently had MRI of the brain as a follow-up for multiple sclerosis and there were multiple calvarial lesions noted which was suspicious for metastatic disease.  There was no evidence of new multiple sclerosis lesions.  She had a PET scan on 8/30/2019 which showed widespread bone metastatic lesions (calvarium, spine, sacrum, pelvis, ribs most prominent at C7, T3, L1 and L4), hypermetabolic 3 cm lesion in LLL, and mediastinal/hilar LN. CEA wnl at 1.9 and C27-29 elevated to 415 (28 on 8/23/16). A CT guided biopsy of the right iliac bone was obtained on 9/4/19, pathology consistent with metastatic adenocarcinoma (breast),  ER/SD negative, HER-2 negative PDL 1 < 1%. A second biopsy was take of the LLL nodule via EBUS on 9/5/19 did not show any malignancy.    C/T/L spine MRI 8/3/19 to 9/2/19 with numerous enhancing lesions of the C, T and L spine, largest around T9 and L1. No evidence of cord compression. Has a L1 compression fracture and impending L4.     Received radiation T12-L5 3000 cGy in 10 fractions from 9/6/2019 till 9/18/2019.  Neurosurgery recommended no surgical intervention but to wear Descanso brace when out of bed and HOB >30 degrees    She started palliative Xeloda 2000/1500 on 10/1/2019 but after just a few doses she noticed nausea, red eyes blurred vision, loss of appetite.  She stopped taking it after 5 doses and was seen by nurse practitioner on 10/7/2019 when she was improving.  At that point she was restarted on Xeloda at a lower dose of 1000 mg twice a day.  As she was not able to tolerate even the lower dose with extreme nausea and vomiting and feeling extremely fatigued and blurry vision, we decided on stopping Xeloda.    We decided on repeating scans and MRI brain on 10/25/2019 showed no intracranial metastatic disease.   Multiple enhancing calvarial lesions may be increased in number and are suggestive of metastatic disease. Thinner imaging on the current study may identify the smaller lesions and may be responsible for  identified more lesions.   There is a single focus of T2 hyperintense signal within the anterior right temporal lobe may represent interval demyelination. There is no evidence for active demyelination.    PET/CT on 11/1/2019 showed favorable response to therapy and Overall FDG uptake within scattered osseous metastases is decreased since 8/30/2019, particularly within the pelvis. Increased sclerotic appearance of L1 and L4 pathologic compression deformities, likely sequela of recently completed palliative radiation therapy.    No significant FDG uptake in previously demonstrated hypermetabolic  mediastinal lymph nodes. Biopsy negative on 9/5/2019.  There is also decreased FDG uptake in the left lower lobe (biopsy negative for  malignancy), now with dense consolidation containing air bronchograms suggestive of infection versus aspiration.  There is diffuse FDG uptake within the esophagus, consistent with esophagitis.    Because of intolerance to Xeloda we decided on switching to weekly paclitaxel on 11/5/2019.    C#2 Taxol 12/4/19- started with 70mg/m2 dose reduction    C#3 Taxol 12/30/2019     PET/CT on 1/24/2020 showed progression of the disease in some of the bone lesions including increase in size and lytic lesion within the vertebral body of C4 measuring 1 cm as opposed to 0.6 cm previously and a new central soft tissue nodule with SUV max 4.1 when previously it was non-hypermetabolic.  There is also some progression noted in the right proximal femur and right iliac bone.  There is decreased metabolic uptake in the right lamina of T9 with SUV max 4.7 when previously it was 8.0.  Other lesions are stable.    CA 27-29 also had increased significantly and it was 257 on 1/28/2020.    We decided on switching to eribulin on 1/28/2020.    C#2 2/18/2020  C#2 D#8 2/25/2020    C#3 D#1 3/18/2020 -delayed by 1 week as per patient's preference because she wanted to visit her family.    She had a repeat PET/CT on 3/27/2020 which showed stable size of the bone metastatic lesions although the FDG avidity was less, consistent with treatment response.    She had MRI of the thoracic spine on 4/3/2020 and it was compared to the one which was done in August 2019 and it showed increased size of several metastatic lesions most notably at T9 but also at T5-T7.  Other lesions at T10, T11 and T12 appeared improved.    CA 27-29 was also down to 222 on 3/18/2020.    Cycle #4 eribulin ( dose reduced to 1.2 mg/m2 )-4/7/2020-   Cycle #5 Eribulin ( 1.2 mg/m2 )- 4/28/2020   Cycle #6 Eribulin ( 1.2 mg/m2 )- 5/19/2020     Cycle #7  Eribulin ( 1.2 mg/m2 )- 6/9/2020    Cycle #8 Eribulin ( 1.2 mg/m2 )- 6/30/2020     She had a repeat PET scan on 7/17/2020 which showed incidental finding of new acute bilateral pulmonary embolism in the distal left main pulmonary artery extending in the left upper and lower lobes and in the segmental and branch arteries in the right lower lobe.    It also showed mildly increased FDG avidity in the lytic lesion in the T9 lamina and right pedicle with SUV max 5.3, previously 3.9.  That is also slightly increased FDG uptake to the left anterior fifth rib at the costochondral junction SUV max 3.9, previously 2.5.  There is slightly decreased FDG uptake in the right femoral neck lesion SUV max 2.2, previously 2.9.  FDG uptake in other bone lesions is fairly stable.  No new metastasis are seen.    CA 27-29 was 308 on 6/30/2020.  It was 286 on 5/19/2020.    Overall this is consistent with only slight progression versus stable disease.    She was started on Lovenox.    Cycle #9 eribulin 7/21/2020. CA 27-29 was 238    C#10 eribulin 8/18/2020. ( delayed by one week for ANC 0.9 )    C#11 Eribulin 9/8/2020    C#12 Eribulin 9/29/2020     C#13 Eribulin 10/20/2020     PET scan on 11/6/2020 shows progression of the disease with increased FDG uptake in the lytic lesions in the T9/T10 and right posterolateral elements as well as increased FDG uptake in the previously known hypermetabolic right iliac bone lesion suggesting recurrence of previously treated metastasis.  There is new subtle lesion in the right manubrium.  There is also a new fracture in the anterolateral left fourth rib with associated uptake and this could be a pathologic fracture.  Healing fracture in the left anterior fifth rib lesion.  There is resolution of the left pulmonary emboli.  Decreased FDG uptake of the esophagus consistent with improving inflammation.    CA 27-29 on 10/20/2020 was also elevated at 489.    C#1 Trodelvy on 11/11/2020.  Cycle #2 Trodelvy  12/2/2020  Cycle number 2-day #8 Trodelvy 12/8/2020.    12/15/2020-12/16/2020.  She was admitted to the hospital with nausea vomiting and dehydration.  She had tachycardia and initial lactic acid of 5.  It decreased to 1.4 after 2 L of normal saline.  She also had hypokalemia and ANC was 1.3.  She was treated with IV fluids.  Procalcitonin was negative.  CTA of the chest was negative for pulmonary embolism or pneumonia.  No bacterial infection was documented.  Her medication which she was initially unable to tolerate at home, were restarted and she was discharged home once started to feel better.      We delayed the start of cycle number 3 x 1 week and decrease the dose of chemotherapy by 25%.  She also had neutropenia with ANC of 1.0.      She started cycle number 3-day #1 on 12/29/2020.  CA 27-29 was 392.    Cycle number 3-day #8 1/5/2021.    C#4 Trodelvy 1/20/2021- 75% dose    2/5/2021.  PET/CT overall shows a good response to treatment with improvement of previously hypermetabolic lesions in the spine and pelvis and stability of other bone meta stasis.  There is a single lytic lesion in the right iliac bone which demonstrates slight Yimi increased FDG uptake and has SUV max 4.7 while previously it was SUV max 3.4.  There was diffuse wall thickening of the esophagus with uptake in the esophagus in the fundus of the stomach and this could be seen with inflammation.    Trodelvy cycle #5 - 2/10/2021.  75% of the dose.  CA 27-29 was 337.    Trodelvy cycle #6 - 3/3/2021.  75% of the dose.  Trodelvy cycle #6, D#8 - 3/10/2021.  75% of the dose.    Trodelvy C#8, D#8 on 4/21/2021  CA 27-29 stable at 371 on 4/14/2021    Trodelvy, cycle #9- 5/5/2021        On 2/25/2020 when she had biopsy of the right acetabulum and it was consistent with metastatic lobular carcinoma.  Again it was Triple Negative.     On 3/18/2020 when she also met with Dr. Arelis Arshad who also advised testing for androgen receptor studies on the biopsy  specimen.  Tumor was diffusely androgen receptor positive.      5/26/2021-cycle #10 Trodelvy started    CA 27-29 was 545.    6/11/2021.  PET/CT shows mildly increased uptake in several bone lesions for example in the left anterior iliac crest, mid right iliac crest and left ischium.  Uptake in other bone lesions are similar to previously.    Because of minimal progression of the disease and she is tolerating chemotherapy very well, we decided on continuing the same chemotherapy.    6/16/2021-Trodelvy cycle #11    7/7/2021 -Trodelvy cycle #12    9/14/2021-Trodelvy-cycle #15, day #8.    9/8/2021-CA 27-29 was more elevated and it was 1176.    PET/CT on 9/24/2021 showed stable findings with no evidence of FDG avid metastatic disease within the body.  Left axillary lymph nodes are prominent and hypermetabolic and likely from recent vaccinations in the left deltoid muscle.  Healed bony metastasis seems stable.      Cycle #16, Trodelvy 9/28/2021.      Interval history.  This is a video visit.  I also reviewed notes from Dr. Arshad which she saw yesterday.     Patient tells me that she has been doing well and overall tolerating chemotherapy well.  She mentions that she feels somewhat fatigued for the first couple of days after chemotherapy and then starts to feel better.  She denies that the fatigue is debilitating or hampering her activities too much.  Overall she feels that her quality of life is pretty good and she is handling the chemotherapy very well.  She also mentioned that her pain is under good control with MS Contin and morphine sulfate twice a day.  Denies nausea vomiting diarrhea constipation.  No infections or shortness of breath.  Neuropathy is mild and stable.          ECOG 1    ROS:  Otherwise a comprehensive review of the system was unremarkable.        I reviewed other history in epic as below.       PAST MEDICAL HISTORY:     1.  Breast cancer  2.  Multiple sclerosis.   3.  Depression.   4.  Migraines.    5.  Hypertension.   6.  Cholecystectomy and umbilical hernia repair.     SOCIAL HISTORY: She smoked for 5 years but quit many years ago.  Drinks alcohol socially.  She lives with her .  She is a teacher in middle school.       FAMILY HISTORY: She mentions that her mother had breast cancer at 49.  Both grandmothers had breast cancer but she is not sure of the age.  A couple of cousins have cancers.  Patient has 3 children who are healthy.    Current Outpatient Medications   Medication     acetaminophen (TYLENOL) 500 MG tablet     albuterol (PROAIR HFA/PROVENTIL HFA/VENTOLIN HFA) 108 (90 Base) MCG/ACT inhaler     busPIRone (BUSPAR) 15 MG tablet     calcium citrate-vitamin D (CITRACAL) 315-250 MG-UNIT TABS     Cholecalciferol (VITAMIN D3 PO)     ELIQUIS ANTICOAGULANT 5 MG tablet     HEMP OIL OR EXTRACT OR OTHER CBD CANNABINOID, NOT MEDICAL CANNABIS,     ibuprofen (ADVIL/MOTRIN) 600 MG tablet     loratadine (CLARITIN) 10 MG tablet     LORazepam (ATIVAN) 1 MG tablet     magnesium 250 MG tablet     morphine (MS CONTIN) 15 MG CR tablet     morphine (MSIR) 15 MG IR tablet     OLANZapine (ZYPREXA) 5 MG tablet     potassium chloride ER (KLOR-CON M) 10 MEQ CR tablet     promethazine (PHENERGAN) 25 MG tablet     propranolol ER (INDERAL LA) 80 MG 24 hr capsule     senna-docusate (SENOKOT-S/PERICOLACE) 8.6-50 MG tablet     traZODone (DESYREL) 50 MG tablet     venlafaxine (EFFEXOR-XR) 75 MG 24 hr capsule     No current facility-administered medications for this visit.        Allergies   Allergen Reactions     Bactrim [Sulfamethoxazole W/Trimethoprim]      Internal bleeding     Copaxone [Glatiramer] Hives          PHYSICAL EXAMINATION:     There were no vitals taken for this visit.  Wt Readings from Last 4 Encounters:   09/14/21 62 kg (136 lb 11.2 oz)   09/08/21 63.3 kg (139 lb 9.6 oz)   09/03/21 68 kg (150 lb)   08/25/21 68 kg (150 lb)         Constitutional.  Does not seem to be in any acute distress.  Eyes.  No redness  or discharge noted.  Respiratory.  Speaking in full sentences.  Breathing seems comfortable without any accessory use of muscles.    Skin.  Visualized his skin does not show any obvious rashes.  Musculoskeletal.  Range of motion for visualized areas is intact.  Neurological.  Alert and oriented x3.  Psychiatric.  Mood, mentation and affect are normal.  Decision making capacity is intact.      The rest of a comprehensive physical examination is deferred due to [facility] video visit restrictions.    Labs and Imaging.    Reviewed.    9/28/2021.    CBC shows hemoglobin 10.5.  WBC 3.  Platelets 210.  ANC is pending.  Chemistry unremarkable except albumin 3.1.    Iron studies, ferritin is pending, iron 51, TIBC 268 and iron saturation index 19%.    CA 27-29 was 1176 on 9/8/2021.  It is pending from today.  CA 27-29 was 465 on 6/16/2021.  It was 545 on 5/26/2021      PET/CT on 9/24/2021 showed stable findings with no evidence of FDG avid metastatic disease within the body.  Left axillary lymph nodes are prominent and hypermetabolic and likely from recent vaccinations in the left deltoid muscle.  Healed bony metastasis seems stable.      Biopsy from the right acetabulum shows metastatic lobular carcinoma.  It is triple negative.  Androgen receptor was diffusely positive (90%, moderate intensity) by immunohistochemistry. )  PD-L1 negative.    Foundation one showed CDH1 mutation (initially lobular cancer), CCND1 amplification ( equivocal ). FGF3, FGF4, FGF19 amplification ( Equivocal ). Most promising is CCND1 amplification with abemaciclib showing response in 4/10 patients. FGF3,FGF4 and FGF19 are targetable with pan-FGFR inhibitors.           ASSESSMENT AND PLAN:     Now she has metastatic breast cancer negative breast cancer (androgen receptor positive ) with extensive involvement of the bones.  There is also a left lower lobe hypermetabolic lesion and mediastinal lymph nodes which are hypermetabolic.  The  biopsy from the right iliac bone is consistent with metastatic lobular breast cancer but it is hormone receptor and HER-2 negative and PDL 1 is also negative.    Foundation 1 testing did not reveal any actionable mutations.    She was intolerant to Xeloda and progressed on Taxol and eribulin.      We then switched to recently FDA approved sacituzumab govitecan-hziy (Trodelvy) for TNBC, based on the results of the phase II IMMU-132-01 clinical trial.   She started this on 11/11/2020.      We delayed the start of cycle number 3 x 1 week and decrease the dose of chemotherapy by 25% and she also has neutropenia with ANC of 1.  She started cycle number 3-day #1 on 12/29/2020.      After 4 cycles of chemotherapy, overall the PET scan shows positive response to treatment apart from mildly increased FDG avidity in one of the right iliac bone lesions.  CA 27-29 was 454 slightly elevated from before.    We decided on continuing the same chemotherapy.      She obtained a second opinion from Dr. Castillo from [facility] who recommended a repeat biopsy to be done to make sure that she really has triple negative breast cancer.      She also met with Dr. Arleis Arshad on 3/18/2021.      After 10 cycles of Trodelvy, she had PET/CT on 6/11/2021 which showed mildly increased uptake in some of the bone lesions while a stability in other bone lesions.      We discussed the situation in detail.      Because of minimal progression of the disease plus the fact that she had been tolerating treatments well, we decided on continuing the same chemotherapy.     After 15 cycles, repeat PET scan showed stable findings.  CA 27-29 has been increasing and it is 1176.  It is pending from today.     We discussed the situation in detail.  She is telling me that overall she is tolerating the chemotherapy well and her quality of life is pretty decent on the current regiment and she is very hesitant to change treatments as the scan is stable  although tumor marker is rising.    I would agree that at this time it is reasonable to continue with Trodelvy as we are meeting her goal of significantly slowing down the cancer burden while keeping her quality of life maintained.    Foundation one showed CDH1 mutation (initially lobular cancer), CCND1 amplification ( equivocal ). FGF3, FGF4, FGF19 amplification ( Equivocal ). Most promising is CCND1 amplification with abemaciclib showing response in 4/10 patients. FGF3,FGF4 and FGF19 are targetable with pan-FGFR inhibitor    As the tumor is diffusely positive for androgen receptor, we will consider giving her androgen receptor blockers like Casodex 150 mg daily.    We can consider Doxil every 4 weeks.     She will also meet with Dr. Mejia to discuss about possibility of other trials although at this time she is not very enthusiastic about changing treatments.      Bone disease.  She has metastatic disease to the bone.  Previously she got palliative radiation to T12-L5 3000 cGy in 10 fractions from 9/6/2019 till 9/18/2019.  She was evaluated by orthopedics Dr. Bipin Elliott on 11/1/2019 and conservative management was recommended.    She was on Zometa every 3 months. She last received on 9/29/2020.  Because of progression of the disease in the bones, we switched to Xgeva which she received on 11/11/2020.  PET scan is stable.  Bone lesions show treated meta stasis.  Continue Xgeva every month.  She last received on 9/14/2021.  Continue vitamin D and calcium.       Pain management.  Currently it is under good control with MS Contin and morphine sulfate twice a day.  She is following with palliative care.        Bilateral pulmonary emboli.  She remains on  Eliquis for incidentally found PE on 7/17/2020.     Anemia.  Related to chemotherapy.  Check anemia work-up today.    Neuropathy.  Mild stable neuropathy in her hands.  This is in addition to the chronic balance issues and heat and cold sensitivity from  underlying multiple sclerosis which is also stable for years.  Continue to observe.       Subcutaneous nodule in the scalp.  This looks like an epidermoid cyst.  She thinks it is a stable.  Continue to monitor.     We did not address the following today.    Insomnia.  Continue trazodone.      Wall thickening of esophagus and FDG uptake of fundus of the stomach.  It could be in the setting of inflammation from recent nausea and vomiting.  Symptoms have resolved.  Continue to monitor clinically.          Vision changes.    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  It was thought that this could be due to Taxol as patient reported to the ophthalmologist that she was on Taxol in September 2019 when her symptoms started.  But she was not on Taxol in September 2019 when she started noticing the visual changes so Taxol is not responsible for this.  The first dose of Taxol was on 11/5/2019.   She had left cataract extraction on 2/8/2021 and she has noticed significant improvement in her vision.  She plans to do cataract extraction of the right eye in couple of weeks.          Increased FDG ability in the colon.  She had FDG uptake in the cecum and ascending colon but no corresponding lesion was seen on the CT scan.  She is completely asymptomatic so at this time we will continue to observe.    Discussion regarding healthcare directives.  On previous visit she told me that she will bring the health care directive for our records but she forgot so I told her to bring it on next visit.      Breast implant removal.  She tells me that she was planning to do breast implant removal because there was a recall on this.  Her surgery was scheduled for 9/24/2019.  Because of this new development of metastatic breast cancer and her recent radiation, surgery has been canceled.  We discussed that at this time treatment for metastatic breast cancer would take precedence over the other surgery as the  other surgery would require her to be off chemotherapy for several weeks and in that case the chances of cancer progression would be high and I would not recommend that.    Multiple sclerosis.  She will continue to follow with her neurologist Dr. Quiles.  Currently multiple sclerosis is under control and currently she is not taking any medications.      Return to clinic in 3 weeks       All of her questions were answered to her satisfaction.  She is agreeable and comfortable with the plan.    Dewayne Zepeda MD    Video start time. 7:58 AM  Video stop time.8:18 AM          Again, thank you for allowing me to participate in the care of your patient.        Sincerely,        Dewayne Zepeda MD

## 2021-09-28 NOTE — PATIENT INSTRUCTIONS
trodelvy today and as scheduled    In 3 weeks see NP and Trodelvy and in 6 weeks see me and Karen  See me in 9 and 12 weeks- chemo as per schedule

## 2021-09-28 NOTE — NURSING NOTE
"Oncology Rooming Note    September 28, 2021 7:39 AM   Shaneka Alvarez is a 61 year old female who presents for:    Chief Complaint   Patient presents with     Oncology Clinic Visit     follow up     Initial Vitals: /79 (BP Location: Left arm, Patient Position: Chair, Cuff Size: Adult Regular)   Pulse 75   Temp 98  F (36.7  C) (Oral)   Ht 1.575 m (5' 2\")   Wt 65.5 kg (144 lb 6.4 oz)   SpO2 100%   BMI 26.41 kg/m   Estimated body mass index is 26.41 kg/m  as calculated from the following:    Height as of this encounter: 1.575 m (5' 2\").    Weight as of this encounter: 65.5 kg (144 lb 6.4 oz). Body surface area is 1.69 meters squared.  No Pain (0) Comment: Data Unavailable   No LMP recorded. Patient is postmenopausal.  Allergies reviewed: Yes  Medications reviewed: Yes    Medications: Medication refills not needed today.  Pharmacy name entered into [facility]:    [id number] - [place], [place] - [address] [facility][id number] - [place] [address] [facility] [address]  [facility] - [place], [place] - [address] [facility] - [place], [place] - [address] [facility] - [place], [place] - [address] [facility]    Clinical concerns: NO Dr. Zepeda was notified.      Eliza Thakur CMA              "

## 2021-09-28 NOTE — PROGRESS NOTES
Palliative Care Outpatient Clinic Progress Note    Patient Name: Shaneka Alvarez is being evaluated via a billable video visit.    Primary Provider:  Kenzie, 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 Didn't tolerate Xeloda, paclitaxel Nov 2019-Jan 2020, treated with eribulin and zometa. PD seen on PET Nov 2020 as well as a new L 4th rib concerning for a pathologic fracture. Started Trodelvy 11/11/20              - admission Dec 2020 for presumed treatment side effects, continued with Trodelvy at reduced dose. Stable disease on PET Sept 2021. Her tumor marker has risen markedly, though, which might signal that the immunotherapy is becoming less effective  - b/l PE found on PET July 2020, on anticoagulation  - MS with mild right-sided weakness    - long-standing history of migraines     Interim History:  At the last visit we discussed pain, including a possible rotation to buprenorphine. She opted to hold off for now considering how well she has been doing on low doses of opioids.    Patient is on the call by herself    She describes that she recently developed migraines again. She didn't have issues with that since her recurrence, but had several prolonged and severe ones in the past two weeks.   In the past she took a broad range of medications without too much success. She has heard of a new FDA approved treatment with a nerve stimulator that you wear as a band around your upper arm.     Coping:  She is in good spirits.   She understands that the discrepancy between a  stable scan and rising tumor marker might mean that her tumor is ceasing to respond to the trodelvy as effectively. She is grateful that Dr Arshad is looking into options for her should that be the case, but doesn't want to switch treatments at this time.     Social History:  Pertinent changes to social history/social situation since last visit: no changes    Advance Directive Status:   POLST completed 20: DNR/selective treatments    Functional Status:  0 (Fully active, able to carry on all activities without restriction)      Impression & Recommendations & Counselinyo woman with relapsed metastatic breast cancer on Trodelvy.     Migraines: long history, never identified an obvious trigger. Had some quite severe episodes recently. Considering the limited benefit of previous pharmacological treatments, will try nerve stimulator Nerivio        Return to clinic in 3 months    Data / Chart Review:    Review of Systems:   ROS: 10 point ROS neg other than the symptoms noted above in the HPI and pertinents here.         Physical Exam:   Physical Exam:  Vital Signs not checked, virtual visit    CONSTIT: awake, appears comfortable  EENT: MMM, EOMI, no icterus  RESP: reg, nl effort, no cough  MSK: moves x4, SABINA, nl gait  SKIN: no rash, no obvious lesions  NEURO: alert, oriented x3  PSYCH: appropriate affect, memory and thought process intact    The rest of a comprehensive physical examination is deferred  due to public health emergency video visit restrictions.        Current pertinent medications:  MS Contin 15mg HS  MSIR 15-30mg q3h prn              Naloxone prescribed  acetaminophen 1000mg tid  CBD cream     Dexamethasone 4mg q12h     Venlafaxine 225mg daily  Lorazepam 1mg prn  Trazodone 50mg HS  Buspirone 15mg bid     Olanzapine prn after chemo  Promethazine q6h prn  Senna bid prn, miralax bid prn    : ok    Opioid safety assessment:   Expected prognosis >1 year  Risk: Low (ORT 0)  Indication for Opioid Use:  Moderate or Strong  Baseline UDT performed on: not sent yet  Naloxone Script provided if patient also on benzodiazepine: 12/4/2019   Indications for Tapering reviewed: yes, at last visit   No pertinent allergies      Lab and imaging data reviewed:  Comments:         Video-Visit Details    Type of service:  Video Visit    Physician has received verbal consent for a Video Visit from the patient? Yes    Video Start Time: 1128    Video End Time (time video stopped): 1149    Originating Location (pt. Location): Home    Distant Location (provider location):  [facility]     Mode of Communication:  Video Conference via [facility]    Alva Whitaker MD  Palliative Medicine  Pager [phone]

## 2021-09-28 NOTE — PROGRESS NOTES
Infusion Nursing Note:  Shaneka Alvarez presents today for Trodelvy.    Patient seen by provider today: Yes: Dr. Zepeda   present during visit today: Not Applicable.    Note: 10 minutes into Trodelvy infusion, the IV pump stated downstream occlusion. When assessing line patient reported moving her arm and the pump started to alarm. IV line was disconnect and when checking for blood return none was noted. Port was re-accessed with 1 inch needle and blood return noted. Patient denied any pain or tenderness at site. No redness or swelling noted.     Infusion restarted and patient tolerated it well with no concerns.      Intravenous Access:  Implanted Port.    Treatment Conditions:  Lab Results   Component Value Date    HGB 10.5 (L) 09/28/2021    WBC 3.0 (L) 09/28/2021    ANEU 1.7 09/28/2021    ANEUTAUTO 2.4 09/14/2021     09/28/2021      Lab Results   Component Value Date     09/28/2021    POTASSIUM 3.7 09/28/2021    MAG 1.9 09/28/2021    CR 0.68 09/28/2021    RAFAELA 8.6 09/28/2021    BILITOTAL 0.2 09/28/2021    ALBUMIN 3.1 (L) 09/28/2021    ALT 42 09/28/2021    AST 20 09/28/2021     Results reviewed, labs MET treatment parameters, ok to proceed with treatment.      Post Infusion Assessment:  Patient tolerated infusion without incident.  Patient observed for 30 minutes post Trodelvy per protocol.  Blood return noted pre and post infusion.  Site patent and intact, free from redness, edema or discomfort.  No evidence of extravasations.  Access discontinued per protocol.       Discharge Plan:   Patient will return 10/5 for next appointment.   Patient discharged in stable condition accompanied by: self.  Departure Mode: Ambulatory.      Cristina Loaiza RN

## 2021-09-28 NOTE — PROGRESS NOTES
SPIRITUAL HEALTH SERVICES Progress Note  [facility]    Visited Shaneka Alvarez in the infusion center for ongoing emotional/spiritual support.  Offered reflective conversation, support and affirmation as she shared her journey.       Illness Narrative - Patient has been feeling well over all and feels that other than her tumor markers being elevated she is doing well.       Concerns - Patient had a second opinion with an MD at the [facility] and found out that she may be eligible for a clinical trial but she is uncertain if she should make the change in medication now or stick with what she knows.       Coping -  She has decided to seek out more information about the clinical trial. Her daughter from [place] is coming for a visit in October. Patient is hoping to take her e-bike [place] out for a ride today.       Meaning-Making - Family and quality of life are very important to her.       Plan - Patient  knows that she can request a Spiritual Health encounter as needed.     Gina Pineda  Staff

## 2021-09-28 NOTE — PROGRESS NOTES
ONCOLOGY FOLLOW UP NOTE: 9/28/21        I took the history from reviewing the previous notes that she was following with Dr. Amaya.  I have copied and updated from prior notes.    ONCOLOGY History:    1.  January 2006:  Diagnosed with Stage IIB, T2 N1 M0 invasive lobular carcinoma of the right breast.  Final pathology showed a 4.5 x 3.8 x 2.5 cm, 01/14 lymph nodes positive.  Estrogen, progesterone receptor positive, HER-2 negative.     2.  Genetics.  BRCA1 and 2 mutations not detected. 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       THERAPY TO DATE:   1.  2006:  ECOG 2104 protocol of dose-dense Adriamycin, Cytoxan and Avastin x4 cycles followed by Taxol and Avastin x4 cycles.   2.  03/2007:  Completed 1 year Avastin.   3.  11/2006-01/2007:  Radiotherapy to the right breast of 5040 cGy.   4.  03/[id number]:  Aromasin.  Stopped after moving to the [facility].   5.  01/2016:  Right modified radical mastectomy with latissimus dorsi flap reconstruction and a left prophylactic mastectomy with latissimus dorsi flap reconstruction.     She recently had MRI of the brain as a follow-up for multiple sclerosis and there were multiple calvarial lesions noted which was suspicious for metastatic disease.  There was no evidence of new multiple sclerosis lesions.  She had a PET scan on 8/30/2019 which showed widespread bone metastatic lesions (calvarium, spine, sacrum, pelvis, ribs most prominent at C7, T3, L1 and L4), hypermetabolic 3 cm lesion in LLL, and mediastinal/hilar LN. CEA wnl at 1.9 and C27-29 elevated to 415 (28 on 8/23/16). A CT guided biopsy of the right iliac bone was obtained on 9/4/19, pathology consistent with metastatic adenocarcinoma (breast), ER/WI negative, HER-2 negative PDL 1 < 1%. A second biopsy was take of the LLL nodule via EBUS on 9/5/19 did not show any malignancy.    C/T/L spine MRI 8/3/19 to 9/2/19 with numerous enhancing lesions of the C, T and L spine, largest around T9 and L1. No evidence  of cord compression. Has a L1 compression fracture and impending L4.     Received radiation T12-L5 3000 cGy in 10 fractions from 9/6/2019 till 9/18/2019.  Neurosurgery recommended no surgical intervention but to wear Gorge brace when out of bed and HOB >30 degrees    She started palliative Xeloda 2000/1500 on 10/1/2019 but after just a few doses she noticed nausea, red eyes blurred vision, loss of appetite.  She stopped taking it after 5 doses and was seen by nurse practitioner on 10/7/2019 when she was improving.  At that point she was restarted on Xeloda at a lower dose of 1000 mg twice a day.  As she was not able to tolerate even the lower dose with extreme nausea and vomiting and feeling extremely fatigued and blurry vision, we decided on stopping Xeloda.    We decided on repeating scans and MRI brain on 10/25/2019 showed no intracranial metastatic disease.   Multiple enhancing calvarial lesions may be increased in number and are suggestive of metastatic disease. Thinner imaging on the current study may identify the smaller lesions and may be responsible for  identified more lesions.   There is a single focus of T2 hyperintense signal within the anterior right temporal lobe may represent interval demyelination. There is no evidence for active demyelination.    PET/CT on 11/1/2019 showed favorable response to therapy and Overall FDG uptake within scattered osseous metastases is decreased since 8/30/2019, particularly within the pelvis. Increased sclerotic appearance of L1 and L4 pathologic compression deformities, likely sequela of recently completed palliative radiation therapy.    No significant FDG uptake in previously demonstrated hypermetabolic mediastinal lymph nodes. Biopsy negative on 9/5/2019.  There is also decreased FDG uptake in the left lower lobe (biopsy negative for  malignancy), now with dense consolidation containing air bronchograms suggestive of infection versus aspiration.  There is diffuse  FDG uptake within the esophagus, consistent with esophagitis.    Because of intolerance to Xeloda we decided on switching to weekly paclitaxel on 11/5/2019.    C#2 Taxol 12/4/19- started with 70mg/m2 dose reduction    C#3 Taxol 12/30/2019     PET/CT on 1/24/2020 showed progression of the disease in some of the bone lesions including increase in size and lytic lesion within the vertebral body of C4 measuring 1 cm as opposed to 0.6 cm previously and a new central soft tissue nodule with SUV max 4.1 when previously it was non-hypermetabolic.  There is also some progression noted in the right proximal femur and right iliac bone.  There is decreased metabolic uptake in the right lamina of T9 with SUV max 4.7 when previously it was 8.0.  Other lesions are stable.    CA 27-29 also had increased significantly and it was 257 on 1/28/2020.    We decided on switching to eribulin on 1/28/2020.    C#2 2/18/2020  C#2 D#8 2/25/2020    C#3 D#1 3/18/2020 -delayed by 1 week as per patient's preference because she wanted to visit her family.    She had a repeat PET/CT on 3/27/2020 which showed stable size of the bone metastatic lesions although the FDG avidity was less, consistent with treatment response.    She had MRI of the thoracic spine on 4/3/2020 and it was compared to the one which was done in August 2019 and it showed increased size of several metastatic lesions most notably at T9 but also at T5-T7.  Other lesions at T10, T11 and T12 appeared improved.    CA 27-29 was also down to 222 on 3/18/2020.    Cycle #4 eribulin ( dose reduced to 1.2 mg/m2 )-4/7/2020-   Cycle #5 Eribulin ( 1.2 mg/m2 )- 4/28/2020   Cycle #6 Eribulin ( 1.2 mg/m2 )- 5/19/2020     Cycle #7 Eribulin ( 1.2 mg/m2 )- 6/9/2020    Cycle #8 Eribulin ( 1.2 mg/m2 )- 6/30/2020     She had a repeat PET scan on 7/17/2020 which showed incidental finding of new acute bilateral pulmonary embolism in the distal left main pulmonary artery extending in the left upper and  lower lobes and in the segmental and branch arteries in the right lower lobe.    It also showed mildly increased FDG avidity in the lytic lesion in the T9 lamina and right pedicle with SUV max 5.3, previously 3.9.  That is also slightly increased FDG uptake to the left anterior fifth rib at the costochondral junction SUV max 3.9, previously 2.5.  There is slightly decreased FDG uptake in the right femoral neck lesion SUV max 2.2, previously 2.9.  FDG uptake in other bone lesions is fairly stable.  No new metastasis are seen.    CA 27-29 was 308 on 6/30/2020.  It was 286 on 5/19/2020.    Overall this is consistent with only slight progression versus stable disease.    She was started on Lovenox.    Cycle #9 eribulin 7/21/2020. CA 27-29 was 238    C#10 eribulin 8/18/2020. ( delayed by one week for ANC 0.9 )    C#11 Eribulin 9/8/2020    C#12 Eribulin 9/29/2020     C#13 Eribulin 10/20/2020     PET scan on 11/6/2020 shows progression of the disease with increased FDG uptake in the lytic lesions in the T9/T10 and right posterolateral elements as well as increased FDG uptake in the previously known hypermetabolic right iliac bone lesion suggesting recurrence of previously treated metastasis.  There is new subtle lesion in the right manubrium.  There is also a new fracture in the anterolateral left fourth rib with associated uptake and this could be a pathologic fracture.  Healing fracture in the left anterior fifth rib lesion.  There is resolution of the left pulmonary emboli.  Decreased FDG uptake of the esophagus consistent with improving inflammation.    CA 27-29 on 10/20/2020 was also elevated at 489.    C#1 Trodelvy on 11/11/2020.  Cycle #2 Trodelvy 12/2/2020  Cycle number 2-day #8 Trodelvy 12/8/2020.    12/15/2020-12/16/2020.  She was admitted to the hospital with nausea vomiting and dehydration.  She had tachycardia and initial lactic acid of 5.  It decreased to 1.4 after 2 L of normal saline.  She also had  hypokalemia and ANC was 1.3.  She was treated with IV fluids.  Procalcitonin was negative.  CTA of the chest was negative for pulmonary embolism or pneumonia.  No bacterial infection was documented.  Her medication which she was initially unable to tolerate at home, were restarted and she was discharged home once started to feel better.      We delayed the start of cycle number 3 x 1 week and decrease the dose of chemotherapy by 25%.  She also had neutropenia with ANC of 1.0.      She started cycle number 3-day #1 on 12/29/2020.  CA 27-29 was 392.    Cycle number 3-day #8 1/5/2021.    C#4 Trodelvy 1/20/2021- 75% dose    2/5/2021.  PET/CT overall shows a good response to treatment with improvement of previously hypermetabolic lesions in the spine and pelvis and stability of other bone meta stasis.  There is a single lytic lesion in the right iliac bone which demonstrates slight Yimi increased FDG uptake and has SUV max 4.7 while previously it was SUV max 3.4.  There was diffuse wall thickening of the esophagus with uptake in the esophagus in the fundus of the stomach and this could be seen with inflammation.    Trodelvy cycle #5 - 2/10/2021.  75% of the dose.  CA 27-29 was 337.    Trodelvy cycle #6 - 3/3/2021.  75% of the dose.  Trodelvy cycle #6, D#8 - 3/10/2021.  75% of the dose.    Trodelvy C#8, D#8 on 4/21/2021  CA 27-29 stable at 371 on 4/14/2021    Trodelvy, cycle #9- 5/5/2021        On 2/25/2020 when she had biopsy of the right acetabulum and it was consistent with metastatic lobular carcinoma.  Again it was Triple Negative.     On 3/18/2020 when she also met with Dr. Arelis Arshad who also advised testing for androgen receptor studies on the biopsy specimen.  Tumor was diffusely androgen receptor positive.      5/26/2021-cycle #10 Trodelvy started    CA 27-29 was 545.    6/11/2021.  PET/CT shows mildly increased uptake in several bone lesions for example in the left anterior iliac crest, mid right iliac crest and  left ischium.  Uptake in other bone lesions are similar to previously.    Because of minimal progression of the disease and she is tolerating chemotherapy very well, we decided on continuing the same chemotherapy.    6/16/2021-Trodelvy cycle #11    7/7/2021 -Trodelvy cycle #12    9/14/2021-Trodelvy-cycle #15, day #8.    9/8/2021-CA 27-29 was more elevated and it was 1176.    PET/CT on 9/24/2021 showed stable findings with no evidence of FDG avid metastatic disease within the body.  Left axillary lymph nodes are prominent and hypermetabolic and likely from recent vaccinations in the left deltoid muscle.  Healed bony metastasis seems stable.      Cycle #16, Trodelvy 9/28/2021.      Interval history.  This is a video visit.  I also reviewed notes from Dr. Arshad which she saw yesterday.     Patient tells me that she has been doing well and overall tolerating chemotherapy well.  She mentions that she feels somewhat fatigued for the first couple of days after chemotherapy and then starts to feel better.  She denies that the fatigue is debilitating or hampering her activities too much.  Overall she feels that her quality of life is pretty good and she is handling the chemotherapy very well.  She also mentioned that her pain is under good control with MS Contin and morphine sulfate twice a day.  Denies nausea vomiting diarrhea constipation.  No infections or shortness of breath.  Neuropathy is mild and stable.          ECOG 1    ROS:  Otherwise a comprehensive review of the system was unremarkable.        I reviewed other history in epic as below.       PAST MEDICAL HISTORY:     1.  Breast cancer  2.  Multiple sclerosis.   3.  Depression.   4.  Migraines.   5.  Hypertension.   6.  Cholecystectomy and umbilical hernia repair.     SOCIAL HISTORY: She smoked for 5 years but quit many years ago.  Drinks alcohol socially.  She lives with her .  She is a teacher in middle school.       FAMILY HISTORY: She mentions that her  mother had breast cancer at 49.  Both grandmothers had breast cancer but she is not sure of the age.  A couple of cousins have cancers.  Patient has 3 children who are healthy.    Current Outpatient Medications   Medication     acetaminophen (TYLENOL) 500 MG tablet     albuterol (PROAIR HFA/PROVENTIL HFA/VENTOLIN HFA) 108 (90 Base) MCG/ACT inhaler     busPIRone (BUSPAR) 15 MG tablet     calcium citrate-vitamin D (CITRACAL) 315-250 MG-UNIT TABS     Cholecalciferol (VITAMIN D3 PO)     ELIQUIS ANTICOAGULANT 5 MG tablet     HEMP OIL OR EXTRACT OR OTHER CBD CANNABINOID, NOT MEDICAL CANNABIS,     ibuprofen (ADVIL/MOTRIN) 600 MG tablet     loratadine (CLARITIN) 10 MG tablet     LORazepam (ATIVAN) 1 MG tablet     magnesium 250 MG tablet     morphine (MS CONTIN) 15 MG CR tablet     morphine (MSIR) 15 MG IR tablet     OLANZapine (ZYPREXA) 5 MG tablet     potassium chloride ER (KLOR-CON M) 10 MEQ CR tablet     promethazine (PHENERGAN) 25 MG tablet     propranolol ER (INDERAL LA) 80 MG 24 hr capsule     senna-docusate (SENOKOT-S/PERICOLACE) 8.6-50 MG tablet     traZODone (DESYREL) 50 MG tablet     venlafaxine (EFFEXOR-XR) 75 MG 24 hr capsule     No current facility-administered medications for this visit.        Allergies   Allergen Reactions     Bactrim [Sulfamethoxazole W/Trimethoprim]      Internal bleeding     Copaxone [Glatiramer] Hives          PHYSICAL EXAMINATION:     There were no vitals taken for this visit.  Wt Readings from Last 4 Encounters:   09/14/21 62 kg (136 lb 11.2 oz)   09/08/21 63.3 kg (139 lb 9.6 oz)   09/03/21 68 kg (150 lb)   08/25/21 68 kg (150 lb)         Constitutional.  Does not seem to be in any acute distress.  Eyes.  No redness or discharge noted.  Respiratory.  Speaking in full sentences.  Breathing seems comfortable without any accessory use of muscles.    Skin.  Visualized his skin does not show any obvious rashes.  Musculoskeletal.  Range of motion for visualized areas is  intact.  Neurological.  Alert and oriented x3.  Psychiatric.  Mood, mentation and affect are normal.  Decision making capacity is intact.      The rest of a comprehensive physical examination is deferred due to Public Health Emergency video visit restrictions.    Labs and Imaging.    Reviewed.    9/28/2021.    CBC shows hemoglobin 10.5.  WBC 3.  Platelets 210.  ANC is pending.  Chemistry unremarkable except albumin 3.1.    Iron studies, ferritin is pending, iron 51, TIBC 268 and iron saturation index 19%.    CA 27-29 was 1176 on 9/8/2021.  It is pending from today.  CA 27-29 was 465 on 6/16/2021.  It was 545 on 5/26/2021      PET/CT on 9/24/2021 showed stable findings with no evidence of FDG avid metastatic disease within the body.  Left axillary lymph nodes are prominent and hypermetabolic and likely from recent vaccinations in the left deltoid muscle.  Healed bony metastasis seems stable.      Biopsy from the right acetabulum shows metastatic lobular carcinoma.  It is triple negative.  Androgen receptor was diffusely positive (90%, moderate intensity) by immunohistochemistry. )  PD-L1 negative.    Foundation one showed CDH1 mutation (initially lobular cancer), CCND1 amplification ( equivocal ). FGF3, FGF4, FGF19 amplification ( Equivocal ). Most promising is CCND1 amplification with abemaciclib showing response in 4/10 patients. FGF3,FGF4 and FGF19 are targetable with pan-FGFR inhibitors.           ASSESSMENT AND PLAN:     Now she has metastatic breast cancer negative breast cancer (androgen receptor positive ) with extensive involvement of the bones.  There is also a left lower lobe hypermetabolic lesion and mediastinal lymph nodes which are hypermetabolic.  The biopsy from the right iliac bone is consistent with metastatic lobular breast cancer but it is hormone receptor and HER-2 negative and PDL 1 is also negative.    Foundation 1 testing did not reveal any actionable mutations.    She was intolerant to Xeloda  and progressed on Taxol and eribulin.      We then switched to recently FDA approved sacituzumab govitecan-hziy (Trodelvy) for TNBC, based on the results of the phase II IMMU-132-01 clinical trial.   She started this on 11/11/2020.      We delayed the start of cycle number 3 x 1 week and decrease the dose of chemotherapy by 25% and she also has neutropenia with ANC of 1.  She started cycle number 3-day #1 on 12/29/2020.      After 4 cycles of chemotherapy, overall the PET scan shows positive response to treatment apart from mildly increased FDG avidity in one of the right iliac bone lesions.  CA 27-29 was 454 slightly elevated from before.    We decided on continuing the same chemotherapy.      She obtained a second opinion from Dr. Castillo from [facility] who recommended a repeat biopsy to be done to make sure that she really has triple negative breast cancer.      She also met with Dr. Arelis Arshad on 3/18/2021.      After 10 cycles of Trodelvy, she had PET/CT on 6/11/2021 which showed mildly increased uptake in some of the bone lesions while a stability in other bone lesions.      We discussed the situation in detail.      Because of minimal progression of the disease plus the fact that she had been tolerating treatments well, we decided on continuing the same chemotherapy.     After 15 cycles, repeat PET scan showed stable findings.  CA 27-29 has been increasing and it is 1176.  It is pending from today.     We discussed the situation in detail.  She is telling me that overall she is tolerating the chemotherapy well and her quality of life is pretty decent on the current regiment and she is very hesitant to change treatments as the scan is stable although tumor marker is rising.    I would agree that at this time it is reasonable to continue with Trodelvy as we are meeting her goal of significantly slowing down the cancer burden while keeping her quality of life maintained.    Foundation one showed  CDH1 mutation (initially lobular cancer), CCND1 amplification ( equivocal ). FGF3, FGF4, FGF19 amplification ( Equivocal ). Most promising is CCND1 amplification with abemaciclib showing response in 4/10 patients. FGF3,FGF4 and FGF19 are targetable with pan-FGFR inhibitor    As the tumor is diffusely positive for androgen receptor, we will consider giving her androgen receptor blockers like Casodex 150 mg daily.    We can consider Doxil every 4 weeks.     She will also meet with Dr. Mejia to discuss about possibility of other trials although at this time she is not very enthusiastic about changing treatments.      Bone disease.  She has metastatic disease to the bone.  Previously she got palliative radiation to T12-L5 3000 cGy in 10 fractions from 9/6/2019 till 9/18/2019.  She was evaluated by orthopedics Dr. Bipin Elliott on 11/1/2019 and conservative management was recommended.    She was on Zometa every 3 months. She last received on 9/29/2020.  Because of progression of the disease in the bones, we switched to Xgeva which she received on 11/11/2020.  PET scan is stable.  Bone lesions show treated meta stasis.  Continue Xgeva every month.  She last received on 9/14/2021.  Continue vitamin D and calcium.       Pain management.  Currently it is under good control with MS Contin and morphine sulfate twice a day.  She is following with palliative care.        Bilateral pulmonary emboli.  She remains on  Eliquis for incidentally found PE on 7/17/2020.     Anemia.  Related to chemotherapy.  Check anemia work-up today.    Neuropathy.  Mild stable neuropathy in her hands.  This is in addition to the chronic balance issues and heat and cold sensitivity from underlying multiple sclerosis which is also stable for years.  Continue to observe.       Subcutaneous nodule in the scalp.  This looks like an epidermoid cyst.  She thinks it is a stable.  Continue to monitor.     We did not address the following  today.    Insomnia.  Continue trazodone.      Wall thickening of esophagus and FDG uptake of fundus of the stomach.  It could be in the setting of inflammation from recent nausea and vomiting.  Symptoms have resolved.  Continue to monitor clinically.          Vision changes.    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  It was thought that this could be due to Taxol as patient reported to the ophthalmologist that she was on Taxol in September 2019 when her symptoms started.  But she was not on Taxol in September 2019 when she started noticing the visual changes so Taxol is not responsible for this.  The first dose of Taxol was on 11/5/2019.   She had left cataract extraction on 2/8/2021 and she has noticed significant improvement in her vision.  She plans to do cataract extraction of the right eye in couple of weeks.          Increased FDG ability in the colon.  She had FDG uptake in the cecum and ascending colon but no corresponding lesion was seen on the CT scan.  She is completely asymptomatic so at this time we will continue to observe.    Discussion regarding healthcare directives.  On previous visit she told me that she will bring the health care directive for our records but she forgot so I told her to bring it on next visit.      Breast implant removal.  She tells me that she was planning to do breast implant removal because there was a recall on this.  Her surgery was scheduled for 9/24/2019.  Because of this new development of metastatic breast cancer and her recent radiation, surgery has been canceled.  We discussed that at this time treatment for metastatic breast cancer would take precedence over the other surgery as the other surgery would require her to be off chemotherapy for several weeks and in that case the chances of cancer progression would be high and I would not recommend that.    Multiple sclerosis.  She will continue to follow with her neurologist   Etta.  Currently multiple sclerosis is under control and currently she is not taking any medications.      Return to clinic in 3 weeks       All of her questions were answered to her satisfaction.  She is agreeable and comfortable with the plan.    Dewayne Zepeda MD    Video start time. 7:58 AM  Video stop time.8:18 AM

## 2021-09-29 ENCOUNTER — VIRTUAL VISIT (OUTPATIENT)
Dept: ONCOLOGY | Facility: CLINIC | Age: 61
End: 2021-09-29
Attending: HOSPITALIST
Payer: COMMERCIAL

## 2021-09-29 VITALS — WEIGHT: 140 LBS | HEIGHT: 62 IN | BODY MASS INDEX: 25.76 KG/M2

## 2021-09-29 DIAGNOSIS — G43.009 MIGRAINE WITHOUT AURA AND WITHOUT STATUS MIGRAINOSUS, NOT INTRACTABLE: Primary | ICD-10-CM

## 2021-09-29 LAB
CANCER AG27-29 SERPL-ACNC: 824 U/ML (ref 0–39)
EPO SERPL-ACNC: 36 MU/ML
VIT B12 SERPL-MCNC: 323 PG/ML (ref 193–986)

## 2021-09-29 PROCEDURE — 99214 OFFICE O/P EST MOD 30 MIN: CPT | Mod: GT | Performed by: HOSPITALIST

## 2021-09-29 ASSESSMENT — PAIN SCALES - GENERAL: PAINLEVEL: NO PAIN (0)

## 2021-09-29 ASSESSMENT — MIFFLIN-ST. JEOR: SCORE: 1163.29

## 2021-09-29 NOTE — LETTER
9/29/2021         RE: Shaneka Alvarez  [address] [facility] [address]        Dear Colleague,    Thank you for referring your patient, Shaneka Alvarez, to the [facility]. Please see a copy of my visit note below.    Palliative Care Outpatient Clinic Progress Note    Patient Name: Shaneka Alvarez is being evaluated via a billable video visit.    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 Didn't tolerate Xeloda, paclitaxel Nov 2019-Jan 2020, treated with eribulin and zometa. PD seen on PET Nov 2020 as well as a new L 4th rib concerning for a pathologic fracture. Started Trodelvy 11/11/20              - admission Dec 2020 for presumed treatment side effects, continued with Trodelvy at reduced dose. Stable disease on PET Sept 2021. Her tumor marker has risen markedly, though, which might signal that the immunotherapy is becoming less effective  - b/l PE found on PET July 2020, on anticoagulation  - MS with mild right-sided weakness    - long-standing history of migraines     Interim History:  At the last visit we discussed pain, including a possible rotation to buprenorphine. She opted to hold off for now considering how well she has been doing on low doses of opioids.    Patient is on the call by herself    She describes that she recently developed migraines again. She didn't have issues with that since her recurrence, but had several prolonged and severe ones in the past two weeks.   In the past she  took a broad range of medications without too much success. She has heard of a new FDA approved treatment with a nerve stimulator that you wear as a band around your upper arm.     Coping:  She is in good spirits.   She understands that the discrepancy between a stable scan and rising tumor marker might mean that her tumor is ceasing to respond to the trodelvy as effectively. She is grateful that Dr Arshad is looking into options for her should that be the case, but doesn't want to switch treatments at this time.     Social History:  Pertinent changes to social history/social situation since last visit: no changes    Advance Directive Status:   POLST completed 20: DNR/selective treatments    Functional Status:  0 (Fully active, able to carry on all activities without restriction)      Impression & Recommendations & Counselinyo woman with relapsed metastatic breast cancer on Trodelvy.     Migraines: long history, never identified an obvious trigger. Had some quite severe episodes recently. Considering the limited benefit of previous pharmacological treatments, will try nerve stimulator Nerivio        Return to clinic in 3 months    Data / Chart Review:    Review of Systems:   ROS: 10 point ROS neg other than the symptoms noted above in the HPI and pertinents here.         Physical Exam:   Physical Exam:  Vital Signs not checked, virtual visit    CONSTIT: awake, appears comfortable  EENT: MMM, EOMI, no icterus  RESP: reg, nl effort, no cough  MSK: moves x4, SABINA, nl gait  SKIN: no rash, no obvious lesions  NEURO: alert, oriented x3  PSYCH: appropriate affect, memory and thought process intact    The rest of a comprehensive physical examination is deferred  due to public health emergency video visit restrictions.        Current pertinent medications:  MS Contin 15mg HS  MSIR 15-30mg q3h prn              Naloxone prescribed  acetaminophen 1000mg tid  CBD cream     Dexamethasone 4mg q12h     Venlafaxine 225mg  daily  Lorazepam 1mg prn  Trazodone 50mg HS  Buspirone 15mg bid     Olanzapine prn after chemo  Promethazine q6h prn  Senna bid prn, miralax bid prn    : ok    Opioid safety assessment:   Expected prognosis >1 year  Risk: Low (ORT 0)  Indication for Opioid Use: Moderate or Strong  Baseline UDT performed on: not sent yet  Naloxone Script provided if patient also on benzodiazepine: 12/4/2019   Indications for Tapering reviewed: yes, at last visit   No pertinent allergies      Lab and imaging data reviewed:  Comments:         Video-Visit Details    Type of service:  Video Visit    Physician has received verbal consent for a Video Visit from the patient? Yes    Video Start Time: 1128    Video End Time (time video stopped): 1149    Originating Location (pt. Location): Home    Distant Location (provider location):  [facility]     Mode of Communication:  Video Conference via [facility]    Alva Whitaker MD  Palliative Medicine  Pager [phone]        Again, thank you for allowing me to participate in the care of your patient.        Sincerely,        Alva Whitaker MD

## 2021-09-29 NOTE — NURSING NOTE
Shaneka is a 61 year old who is being evaluated via a billable video visit.      How would you like to obtain your AVS? MyChart  If the video visit is dropped, the invitation should be resent by: Text to cell phone: [phone]  Will anyone else be joining your video visit? No      Video-Visit Details    Type of service:  Video Visit    Originating Location (pt. Location): Home    Distant Location (provider location):  [facility]     Platform used for Video Visit: Imtiaz      Would like to discuss use of non-medicated arm band for migraines- would like Dr. Whitaker's opinion on this.

## 2021-10-03 ENCOUNTER — HEALTH MAINTENANCE LETTER (OUTPATIENT)
Age: 61
End: 2021-10-03

## 2021-10-05 ENCOUNTER — LAB (OUTPATIENT)
Dept: ONCOLOGY | Facility: CLINIC | Age: 61
End: 2021-10-05
Payer: COMMERCIAL

## 2021-10-05 ENCOUNTER — INFUSION THERAPY VISIT (OUTPATIENT)
Dept: INFUSION THERAPY | Facility: CLINIC | Age: 61
End: 2021-10-05
Payer: COMMERCIAL

## 2021-10-05 VITALS
HEART RATE: 71 BPM | TEMPERATURE: 97.7 F | RESPIRATION RATE: 18 BRPM | DIASTOLIC BLOOD PRESSURE: 85 MMHG | SYSTOLIC BLOOD PRESSURE: 126 MMHG | BODY MASS INDEX: 26.72 KG/M2 | WEIGHT: 146.1 LBS | OXYGEN SATURATION: 100 %

## 2021-10-05 DIAGNOSIS — E87.6 HYPOKALEMIA: ICD-10-CM

## 2021-10-05 DIAGNOSIS — C50.912 BILATERAL MALIGNANT NEOPLASM OF BREAST IN FEMALE, UNSPECIFIED ESTROGEN RECEPTOR STATUS, UNSPECIFIED SITE OF BREAST (H): ICD-10-CM

## 2021-10-05 DIAGNOSIS — T45.1X5A CHEMOTHERAPY-INDUCED NEUTROPENIA (H): Primary | ICD-10-CM

## 2021-10-05 DIAGNOSIS — D70.1 CHEMOTHERAPY-INDUCED NEUTROPENIA (H): Primary | ICD-10-CM

## 2021-10-05 DIAGNOSIS — D70.1 CHEMOTHERAPY-INDUCED NEUTROPENIA (H): ICD-10-CM

## 2021-10-05 DIAGNOSIS — C50.919 METASTATIC BREAST CANCER: ICD-10-CM

## 2021-10-05 DIAGNOSIS — C50.911 BILATERAL MALIGNANT NEOPLASM OF BREAST IN FEMALE, UNSPECIFIED ESTROGEN RECEPTOR STATUS, UNSPECIFIED SITE OF BREAST (H): ICD-10-CM

## 2021-10-05 DIAGNOSIS — T45.1X5A CHEMOTHERAPY-INDUCED NEUTROPENIA (H): ICD-10-CM

## 2021-10-05 DIAGNOSIS — E87.6 HYPOKALEMIA: Primary | ICD-10-CM

## 2021-10-05 LAB
ALBUMIN SERPL-MCNC: 3.1 G/DL (ref 3.4–5)
ALP SERPL-CCNC: 63 U/L (ref 40–150)
ALT SERPL W P-5'-P-CCNC: 33 U/L (ref 0–50)
ANION GAP SERPL CALCULATED.3IONS-SCNC: 2 MMOL/L (ref 3–14)
AST SERPL W P-5'-P-CCNC: 18 U/L (ref 0–45)
BASOPHILS # BLD MANUAL: 0 10E3/UL (ref 0–0.2)
BASOPHILS NFR BLD MANUAL: 1 %
BILIRUB SERPL-MCNC: 0.3 MG/DL (ref 0.2–1.3)
BUN SERPL-MCNC: 10 MG/DL (ref 7–30)
CALCIUM SERPL-MCNC: 8.6 MG/DL (ref 8.5–10.1)
CHLORIDE BLD-SCNC: 103 MMOL/L (ref 94–109)
CO2 SERPL-SCNC: 32 MMOL/L (ref 20–32)
CREAT SERPL-MCNC: 0.71 MG/DL (ref 0.52–1.04)
EOSINOPHIL # BLD MANUAL: 0.2 10E3/UL (ref 0–0.7)
EOSINOPHIL NFR BLD MANUAL: 7 %
ERYTHROCYTE [DISTWIDTH] IN BLOOD BY AUTOMATED COUNT: 15.2 % (ref 10–15)
GFR SERPL CREATININE-BSD FRML MDRD: >90 ML/MIN/1.73M2
GLUCOSE BLD-MCNC: 109 MG/DL (ref 70–99)
HCT VFR BLD AUTO: 31.7 % (ref 35–47)
HGB BLD-MCNC: 10.3 G/DL (ref 11.7–15.7)
LYMPHOCYTES # BLD MANUAL: 0.6 10E3/UL (ref 0.8–5.3)
LYMPHOCYTES NFR BLD MANUAL: 24 %
MAGNESIUM SERPL-MCNC: 2 MG/DL (ref 1.6–2.3)
MCH RBC QN AUTO: 28.4 PG (ref 26.5–33)
MCHC RBC AUTO-ENTMCNC: 32.5 G/DL (ref 31.5–36.5)
MCV RBC AUTO: 87 FL (ref 78–100)
MONOCYTES # BLD MANUAL: 0.3 10E3/UL (ref 0–1.3)
MONOCYTES NFR BLD MANUAL: 10 %
MYELOCYTES # BLD MANUAL: 0 10E3/UL
MYELOCYTES NFR BLD MANUAL: 1 %
NEUTROPHILS # BLD MANUAL: 1.4 10E3/UL (ref 1.6–8.3)
NEUTROPHILS NFR BLD MANUAL: 57 %
PHOSPHATE SERPL-MCNC: 4.7 MG/DL (ref 2.5–4.5)
PLAT MORPH BLD: ABNORMAL
PLATELET # BLD AUTO: 207 10E3/UL (ref 150–450)
POTASSIUM BLD-SCNC: 3.4 MMOL/L (ref 3.4–5.3)
PROT SERPL-MCNC: 6.3 G/DL (ref 6.8–8.8)
RBC # BLD AUTO: 3.63 10E6/UL (ref 3.8–5.2)
RBC MORPH BLD: ABNORMAL
SODIUM SERPL-SCNC: 137 MMOL/L (ref 133–144)
WBC # BLD AUTO: 2.5 10E3/UL (ref 4–11)

## 2021-10-05 PROCEDURE — 99207 PR NO CHARGE LOS: CPT

## 2021-10-05 PROCEDURE — 96375 TX/PRO/DX INJ NEW DRUG ADDON: CPT | Performed by: NURSE PRACTITIONER

## 2021-10-05 PROCEDURE — 85027 COMPLETE CBC AUTOMATED: CPT | Performed by: INTERNAL MEDICINE

## 2021-10-05 PROCEDURE — 83735 ASSAY OF MAGNESIUM: CPT | Performed by: INTERNAL MEDICINE

## 2021-10-05 PROCEDURE — 84100 ASSAY OF PHOSPHORUS: CPT | Performed by: INTERNAL MEDICINE

## 2021-10-05 PROCEDURE — 96413 CHEMO IV INFUSION 1 HR: CPT | Performed by: NURSE PRACTITIONER

## 2021-10-05 PROCEDURE — 96367 TX/PROPH/DG ADDL SEQ IV INF: CPT | Performed by: NURSE PRACTITIONER

## 2021-10-05 PROCEDURE — 80053 COMPREHEN METABOLIC PANEL: CPT | Performed by: INTERNAL MEDICINE

## 2021-10-05 PROCEDURE — S0028 INJECTION, FAMOTIDINE, 20 MG: HCPCS | Performed by: NURSE PRACTITIONER

## 2021-10-05 RX ORDER — ACETAMINOPHEN 325 MG/1
650 TABLET ORAL ONCE
Status: COMPLETED | OUTPATIENT
Start: 2021-10-05 | End: 2021-10-05

## 2021-10-05 RX ORDER — HEPARIN SODIUM (PORCINE) LOCK FLUSH IV SOLN 100 UNIT/ML 100 UNIT/ML
5 SOLUTION INTRAVENOUS
Status: DISCONTINUED | OUTPATIENT
Start: 2021-10-05 | End: 2021-10-05 | Stop reason: HOSPADM

## 2021-10-05 RX ORDER — DIPHENHYDRAMINE HCL 25 MG
50 CAPSULE ORAL ONCE
Status: COMPLETED | OUTPATIENT
Start: 2021-10-05 | End: 2021-10-05

## 2021-10-05 RX ORDER — HEPARIN SODIUM,PORCINE 10 UNIT/ML
5 VIAL (ML) INTRAVENOUS
Status: DISCONTINUED | OUTPATIENT
Start: 2021-10-05 | End: 2021-10-05 | Stop reason: HOSPADM

## 2021-10-05 RX ADMIN — Medication 50 MG: at 09:39

## 2021-10-05 RX ADMIN — HEPARIN SODIUM (PORCINE) LOCK FLUSH IV SOLN 100 UNIT/ML 5 ML: 100 SOLUTION at 08:51

## 2021-10-05 RX ADMIN — Medication 500 ML: at 09:41

## 2021-10-05 RX ADMIN — ACETAMINOPHEN 650 MG: 325 TABLET ORAL at 09:39

## 2021-10-05 RX ADMIN — HEPARIN SODIUM (PORCINE) LOCK FLUSH IV SOLN 100 UNIT/ML 5 ML: 100 SOLUTION at 12:04

## 2021-10-05 ASSESSMENT — PAIN SCALES - GENERAL: PAINLEVEL: NO PAIN (0)

## 2021-10-05 NOTE — PROGRESS NOTES
Infusion Nursing Note:  Shaneka Alvarez presents today for C16,D8 of Trodelvy.    Patient seen by provider today: No   present during visit today: Not Applicable.    Note: Pt states she is doing well, denies any problems with her infusions, labs within range for treatment today, states she is tolerating the 1 hour infusion.      Intravenous Access:  Implanted Port.    Treatment Conditions:  Lab Results   Component Value Date    HGB 10.3 (L) 10/05/2021    WBC 2.5 (L) 10/05/2021    ANEU 1.4 (L) 10/05/2021    ANEUTAUTO 2.4 09/14/2021     10/05/2021      Lab Results   Component Value Date     10/05/2021    POTASSIUM 3.4 10/05/2021    MAG 2.0 10/05/2021    CR 0.71 10/05/2021    RAFAELA 8.6 10/05/2021    BILITOTAL 0.3 10/05/2021    ALBUMIN 3.1 (L) 10/05/2021    ALT 33 10/05/2021    AST 18 10/05/2021     Results reviewed, labs MET treatment parameters, ok to proceed with treatment.      Post Infusion Assessment:  Patient tolerated infusion without incident.  Patient observed for 30 minutes post Trodelvy per protocol.  Blood return noted pre and post infusion.  Site patent and intact, free from redness, edema or discomfort.  No evidence of extravasations.  Access discontinued per protocol.       Discharge Plan:   Return 10/18/2021 with Shannan Schilling NP before infusion scheduled for C17 on 10/19/2021.  Discharge instructions reviewed with: Patient.  Patient and/or family verbalized understanding of discharge instructions and all questions answered.  Patient discharged in stable condition accompanied by: self.  Departure Mode: Ambulatory.      Loren Costa RN

## 2021-10-06 ENCOUNTER — OFFICE VISIT (OUTPATIENT)
Dept: ONCOLOGY | Facility: CLINIC | Age: 61
End: 2021-10-06
Attending: INTERNAL MEDICINE
Payer: COMMERCIAL

## 2021-10-06 VITALS
WEIGHT: 147.1 LBS | RESPIRATION RATE: 16 BRPM | SYSTOLIC BLOOD PRESSURE: 154 MMHG | BODY MASS INDEX: 26.9 KG/M2 | HEART RATE: 67 BPM | DIASTOLIC BLOOD PRESSURE: 92 MMHG | TEMPERATURE: 98 F | OXYGEN SATURATION: 98 %

## 2021-10-06 DIAGNOSIS — C50.919 METASTATIC BREAST CANCER: Primary | ICD-10-CM

## 2021-10-06 PROCEDURE — G0463 HOSPITAL OUTPT CLINIC VISIT: HCPCS

## 2021-10-06 PROCEDURE — 99215 OFFICE O/P EST HI 40 MIN: CPT | Performed by: INTERNAL MEDICINE

## 2021-10-06 ASSESSMENT — PAIN SCALES - GENERAL: PAINLEVEL: NO PAIN (0)

## 2021-10-06 NOTE — PROGRESS NOTES
[facility] CANCER [facility]  DEVELOPMENTAL THERAPEUTICS CLINIC  NEW PATIENT VISIT NOTE    PATIENT NAME: Shaneka Alvarez  MRN # [id number]   DATE OF VISIT: October 5, 2021  YOB: 1960     Referring Provider: Dr. Arelis Arshad, Medical Oncology  Oncologist: Dr. Dewayne Zepeda, [place]  Neurology: Dr. Quiles    CANCER TYPE: Breast adenocarcinoma, ER/DC negative, HER-2 negative  STAGE: [id number], metastatic recurrence  ECOG PS: 0    PD-L1:  NGS: [facility] on [id number] 2/2021. MSI stable, TMB 3 Mut/Mb, CCND1 amplification, CBFB splice site 165+2_165+3insT, FGF3,4, 19 amplification, CDH1 N181fs*34    Apptio 10/12/16  BRCA 1/2 mutation negative  MSH2 VUS    SUMMARY  1/2006 Dx.   2006 AC + angie x 4 cycles --> paclitaxel angie x 4 cycles. Completed 1 year of bevacizumab 3/2007  11/2006~1/2007 RT to R breast, 5040 cGy  9094-4257 Exemestane  1/2016 R modified radical mastectomy, latissimus dorsi flap reconstruction, prophylactic L mastectomy with reconstruction    8/21/19 Orbit/brain MRI done for MS. Abreu mets - calvarial and L2  8/30/19 PET/CT. Bone, 3 cm LLL nodule, mediastinal/hilar LN  9/4/19 CT bx R iliac bone. Path: lobular breast adeno, ER/DC negative, HER-2 negative by FISH, PD-L1  <1% (TPS), PD-L1 TIL <1%,  TPS 0%, TIL 0%  9/5/19 EBUS, LLL nodule bx. Path: LLL negative for malignancy, showed chronic inflammation. Station 7 node scattered inflammatory cells, no malignancy  9/6~9/18/19 RT to T12-L5, 3000 cGy, 10 fractions for compression fracture and impending fracture at L4  10/1/19 C1 capecitabine (cytotoxic chemo #1) 2000/1500 mg, stopped after 5 doses due to nausea, anorexia, substantial vision changes. Resumed 1000 mg BID, but stopped due to recurrence of side effects  11/5/19~12/30/19 Weekly paclitaxel (cytotoxic chemo #2) --> PD  1/28~10/20/20 Eribulin (cytotoxic chemo #3) --> SD  7/17/20 PET/CT. PD. New PE, increased T9-T10, possibly rib  11/6/20 PET/CT. PD in  bones  11/11/20~current Sacituzumab govitecan (ADC). Dose reduced C4 25% due to cytopenia --> UT  2/25/21 CT R acetabular bx. Path: lobular carcinoma, ER/UT negative, AR 90% positive, moderate intensity by IHC, HER-2 negative by FISH  9/8/21 CA 27-29 increasing to 1176  9/24/21 PET/CT. Healed bone lesions, o/w no metastatic disease    Results for KYLE GU (MRN [id number]) as of 10/5/2021 20:58   5/26/2021 09:15 6/16/2021 09:00 7/7/2021 09:00 7/28/2021 08:31 8/17/2021 09:40 9/8/2021 10:48 9/28/2021 07:20   CA 27-29 545 (H) 465 (H) 729 (H) 801 (H) 606 (H) 1,176 (H) 824 (H)       ASSESSMENT AND PLAN   TNBC, ER/UT negative, HER-2 negative, PD-L1 <1%. AR positive: Here to discuss the enfortumab trial. Unfortunately, she's not eligible as she's had 3 lines of prior cytotoxic therapy, and doesn't have measureable disease by RECIST and doesn't have disease progression on the most recent regimen (which is great!). We don't have other trials for her right now but will keep her on our list and let her, Dr. Arshad and Dr. Zepeda know if an opportunity arises. We reviewed that it's useful to meet early on, even if there isn't necessarily a trial open at the moment, since spots can come and go on some trials and we have to be prepared to move quickly. I discussed how the MS, while quiet and with very few flares, could be affect eligibility for immunotherapy trials. We also talked about what clinical trials are, how they operate, etc. For now, she will continue following up with Dr. Zepeda and Dr. Arshad. I asked her to let me know if any new questions arise.     PE: On apixaban.    Review of the result(s) of each unique test - CMP, CBC Pd, CA27.29  Independent interpretation of a test performed by another physician/other qualified health care professional (not separately reported) - PET/CT 9/24/21    60 minutes spent on the date of the encounter doing chart review, history and exam, documentation and further activities per the  note     Ya Mejia MD  Associate Professor of Medicine  Hematology, Oncology and Transplantation      SUBJECTIVE  Shaneka is a 60 yo female who presents today to DTC clinic to discuss our enfortumab trial. She's been doing ok recently, fortunately, feeling fairly good. Had issues with increasing fatigue before but is doing ok, able to keep active. No MS flares while on chemo. Eating ok. No weight loss. No bleeding. Breathing ok. No F/C, N/V. Just a little bit of residual neuropathy from prior treatments. Of all of the prior treatments she's been through, she's thankful that the Sacituzumab govitecan has been pretty tolerable.     PAST MEDICAL HISTORY  Breast cancer as above  Multiple sclerosis. Last treatment several years ago. Was on copaxone (then became allergic). Manifest as trouble walking, lots of migraines.  PE 7/2020, incidentally identified on routine PET. 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, G1  Insomnia  Cystoid macular edema secondary to capecitabine  Cataract removal L & R 2/8/2021    CURRENT OUTPATIENT MEDICATIONS  Current Outpatient Medications   Medication Sig Dispense Refill     acetaminophen (TYLENOL) 500 MG tablet Take 2 tablets (1,000 mg) by mouth 3 times daily (Patient taking differently: Take 1,000 mg by mouth every 8 hours as needed ) 180 tablet 0     albuterol (PROAIR HFA/PROVENTIL HFA/VENTOLIN HFA) 108 (90 Base) MCG/ACT inhaler Inhale 2 puffs into the lungs       busPIRone (BUSPAR) 15 MG tablet TAKE 1 TABLET(15 MG) BY MOUTH TWICE DAILY 180 tablet 1     calcium citrate-vitamin D (CITRACAL) 315-250 MG-UNIT TABS Take 1 tablet by mouth every morning        Cholecalciferol (VITAMIN D3 PO) Take 2,000 Units by mouth every morning        ELIQUIS ANTICOAGULANT 5 MG tablet TAKE 1 TABLET(5 MG) BY MOUTH TWICE DAILY 60 tablet 3     HEMP OIL OR EXTRACT OR OTHER CBD CANNABINOID, NOT MEDICAL CANNABIS, CBD  cream       ibuprofen (ADVIL/MOTRIN) 600 MG tablet Take 1 tablet (600 mg) by mouth every 6 hours as needed for moderate pain 90 tablet 3     loratadine (CLARITIN) 10 MG tablet Take 10 mg by mouth daily       LORazepam (ATIVAN) 1 MG tablet Take 1 tablet (1 mg) by mouth every 4 hours as needed for anxiety or nausea 30 tablet 0     magnesium 250 MG tablet Take 1 tablet by mouth every morning        morphine (MS CONTIN) 15 MG CR tablet Take 2 tablets (30 mg) by mouth every 12 hours 120 tablet 0     morphine (MSIR) 15 MG IR tablet Take 1-2 tablets (15-30 mg) by mouth every 3 hours as needed for pain 120 tablet 0     Nerve Stimulator (NERIVIO) CRISTOPHER 1 each daily as needed 1 each 3     OLANZapine (ZYPREXA) 5 MG tablet Take one tablet, starting the first day after chemotherapy for 5 days, then stop. Repeat for each chemotherapy cycle for nausea, vomiting.       potassium chloride ER (KLOR-CON M) 10 MEQ CR tablet TAKE 1 TABLET(10 MEQ) BY MOUTH DAILY 14 tablet 0     promethazine (PHENERGAN) 25 MG tablet Take 25 mg by mouth every 6 hours as needed       propranolol ER (INDERAL LA) 80 MG 24 hr capsule TAKE 1 CAPSULE(80 MG) BY MOUTH EVERY MORNING 90 capsule 1     senna-docusate (SENOKOT-S/PERICOLACE) 8.6-50 MG tablet Take 1-2 tablets by mouth 2 times daily as needed for constipation . Goal is to have a bowel movement every 1-2 days. (Patient taking differently: Take 1-2 tablets by mouth as needed for constipation . Goal is to have a bowel movement every 1-2 days.)       traZODone (DESYREL) 50 MG tablet TAKE 1 TABLET(50 MG) BY MOUTH AT BEDTIME 30 tablet 1     venlafaxine (EFFEXOR-XR) 75 MG 24 hr capsule Take 3 capsules (225 mg) by mouth daily 270 capsule 3     ALLERGIES  Allergies   Allergen Reactions     Bactrim [Sulfamethoxazole W/Trimethoprim]      Internal bleeding     Copaxone [Glatiramer] Hives      SOCIAL HISTORY: . Smoked, but only minimally ~ 5 years. . Three children.      FAMILY HISTORY:    Family History   Problem Relation Age of Onset     Breast Cancer Maternal Aunt 50         at 85     Breast Cancer Cousin 40     Breast Cancer Mother 49        2nd primary, contralateral breast at 69;  at 86     Melanoma Mother      Breast Cancer Maternal Grandmother 40     Ovarian Cancer Maternal Grandmother      Breast Cancer Paternal Grandmother 50         at 60     Brain Cancer Cousin 20     Breast Cancer Paternal Aunt 50         at 60     Breast Cancer Cousin 45        paternal cousin     Anesthesia Reaction No family hx of      Deep Vein Thrombosis No family hx of       REVIEW OF SYSTEMS  As above in the HPI, o/w complete 12-point ROS was negative.    PHYSICAL EXAM  BP (!) 154/92 (BP Location: Left arm, Patient Position: Sitting, Cuff Size: Adult Regular)   Pulse 67   Temp 98  F (36.7  C) (Oral)   Resp 16   Wt 66.7 kg (147 lb 1.6 oz)   SpO2 98%   BMI 26.90 kg/m    Wt Readings from Last 3 Encounters:   21 75 kg (165 lb 6.4 oz)   21 74.4 kg (164 lb)   21 74.8 kg (164 lb 14.4 oz)     GEN: NAD  EXT: no edema  NEURO: alert    LABORATORY AND IMAGING STUDIES  Results for KYLE GU (MRN [id number]) as of 10/5/2021 20:58   10/5/2021 08:50   Sodium 137   Potassium 3.4   Chloride 103   Carbon Dioxide 32   Urea Nitrogen 10   Creatinine 0.71   GFR Estimate >90   Calcium 8.6   Anion Gap 2 (L)   Magnesium 2.0   Phosphorus 4.7 (H)   Albumin 3.1 (L)   Protein Total 6.3 (L)   Bilirubin Total 0.3   Alkaline Phosphatase 63   ALT 33   AST 18   Glucose 109 (H)   WBC 2.5 (L)   Hemoglobin 10.3 (L)   Hematocrit 31.7 (L)   Platelet Count 207   RBC Count 3.63 (L)   MCV 87   MCH 28.4   MCHC 32.5   RDW 15.2 (H)   % Neutrophils 57   % Lymphocytes 24   % Monocytes 10   % Eosinophils 7   Absolute Basophils 0.0   % Basophils 1   % Myelocytes 1   Absolute Neutrophil 1.4 (L)   Absolute Lymphocytes 0.6 (L)   Absolute Monocytes 0.3   Absolute Eosinophils 0.2   Absolute Myelocytes 0.0   RBC  Morphology Confirmed RBC Indices   Platelet Morphology Automated Count Confirmed. Platelet morphology is normal.     Labs were independently reviewed and interpreted by me    CT Chest/Abdomen/Pelvis w Contrast  Narrative: Combined Report of:    PET and CT on  9/24/2021 3:50 PM :    1. PET of the neck, chest, abdomen, and pelvis.  2. PET CT Fusion for Attenuation Correction and Anatomical  Localization:    3. CT of the chest, abdomen and pelvis obtained for diagnostic  interpretation.  4. 3D MIP and PET-CT fused images were processed on an independent  workstation and archived to PACS and reviewed by a radiologist.    Technique:    1. PET: The patient received 10.8 mCi of F-18-FDG; the serum glucose  was 92 prior to administration, body weight was 62 kg. Images were  evaluated in the axial, sagittal, and coronal planes as well as the  rotational whole body MIP. Images were acquired from the Vertex to the  Feet.    UPTAKE WAS MEASURED AT 65 MINUTES.     BACKGROUND:  Liver SUV max= 3.7,   Aorta Blood SUV Max: 2.7.     2. CT: Volumetric acquisition for clinical interpretation of the  chest, abdomen, and pelvis acquired at 3 mm sections . The chest,  abdomen, and pelvis were evaluated at 5 mm sections in bone, soft  tissue, and lung windows.      The patient received 84 cc of Isovue 370 intravenously for the  examination.  Oral contrast: 500 mL Breeza.     3. 3D MIP and PET-CT fused images were processed on an independent  workstation and archived to PACS and reviewed by a radiologist.    INDICATION: Invasive breast cancer, stage IV, follow up; Metastatic  breast cancer (H)    ADDITIONAL INFORMATION OBTAINED FROM EMR: 60-year-old female with a  history of invasive lobular carcinoma of the right breast, stage IIB,  T2 N1 M0. ER and CA positive, HER-2 negative. Status post chemotherapy  and radiation, right modified radical mastectomy and flap  reconstruction and left prophylactic mastectomy and flap  reconstruction.  Underlying radiation therapy to T12-L5 with 3000 cGy  fractions from 9/6/2019 to 9/18/2019. Subsequently treated with  Eribulin started on 1/28/2020 and completed 8 cycles as of 6/30/2020.  PET/CT on 11/6/2020 with progression of osseous metastatic disease,  patient was subsequently started on Trodelvy .  COVID vaccine  3/15/2021, 4/5/2021, 9/21/2021 , left deltoid.    COMPARISON: Head CT 6/11/2021, 2/5/2021, 11/6/2020.    FINDINGS:   Multiple tubes: Left chest Port-A-Cath with tip terminating at the  cavoatrial junction.    HEAD/NECK:  There is no  suspicious FDG uptake in the neck.     The paranasal sinuses are clear. The mastoid air cells  are relatively  clear.      The mucosal pharyngeal space, the , prevertebral and carotid  spaces are within normal limits.     No masses, mass effect or pathologically enlarged lymph nodes are  evident. The thyroid gland is within normal limits.    Subcutaneous hyperattenuating lesion along the right aspect of the  occiput, non-FDG avid, most compatible with epidermoid cyst.    CHEST:  There is no suspicious FDG uptake in the chest.     Numerous prominent left FDG avid axillary lymph nodes, given recent  vaccination demonstrations of left deltoid, these are favored to be  reactive. Post surgical changes of right axillary lymph node  dissection. There is no mediastinal or hilar lymphadenopathy. Heart  size is normal. No pericardial effusion. Normal configuration of the  great vessels. Calcified subcarinal lymph nodes. There is esophageal  FDG uptake is again demonstrated. Small hiatal hernia.    The central tracheal infiltrate is patent. No pleural effusion. No  pneumothorax. No focal consolidative opacities. Minimal bibasilar  atelectasis. Stable non-hypermetabolic 2 mm right lower lobe pulmonary  nodule (series 9, image 61). No suspicious pulmonary nodules.     ABDOMEN AND PELVIS:  There is no suspicious FDG uptake in the abdomen or pelvis.    Postsurgical changes of  cholecystectomy. The liver, spleen, adrenal  glands, and pancreas are within normal limits. The scattered areas of  mildly increased FDG uptake in the liver is felt to be within normal  limits. Subcentimeter hypoattenuating cystic lesion within the right  kidney. Left kidney is within normal limits. No hydronephrosis. No  dilated loops of small or large bowel. Colonic diverticulosis without  evidence of acute diverticulitis. Appendix is normal. Urinary bladder  is within normal limits.    No free fluid or free air within the abdomen or pelvis. Major  vasculature is patent. No infrarenal abdominal aortic aneurysm. No  abdominal or pelvic lymphadenopathy.    LOWER EXTREMITIES:   No abnormal masses or hypermetabolic lesions.    BONES:   Redemonstration of extensive mixed lytic and sclerotic lesions within  the skeleton including the spine and pelvis.    Decreased uptake within a left iliac crest sclerotic lesion with  maximum SUV 3.1 (previously 4.2). Stable to minimally  decreased  uptake within a sclerotic lesion involving the posterior right ilium   with max SUV of 2.8 (previously 3.0).  Impression: IMPRESSION: In this patient with diagnosis of stage IV invasive breast  carcinoma, PET CT study demonstrates complete response to therapy:    1. No evidence of FDG avid metastatic disease within the body.  2. Incidental left axillary hyperbolic adenopathy, this is believed to  be secondary to recent Covid vaccination.  3. Multiple healed bony metastasis throughout the skeleton without  increased FDG uptake. These are consistent with treated bony  metastasis.    I have personally reviewed the examination and initial interpretation  and I agree with the findings.    SAMMY PERALTA MD         SYSTEM ID:  [id number]  PET Oncology Whole Body  Narrative: Combined Report of:    PET and CT on  9/24/2021 3:50 PM :    1. PET of the neck, chest, abdomen, and pelvis.  2. PET CT Fusion for Attenuation Correction and  Anatomical  Localization:    3. CT of the chest, abdomen and pelvis obtained for diagnostic  interpretation.  4. 3D MIP and PET-CT fused images were processed on an independent  workstation and archived to PACS and reviewed by a radiologist.    Technique:    1. PET: The patient received 10.8 mCi of F-18-FDG; the serum glucose  was 92 prior to administration, body weight was 62 kg. Images were  evaluated in the axial, sagittal, and coronal planes as well as the  rotational whole body MIP. Images were acquired from the Vertex to the  Feet.    UPTAKE WAS MEASURED AT 65 MINUTES.     BACKGROUND:  Liver SUV max= 3.7,   Aorta Blood SUV Max: 2.7.     2. CT: Volumetric acquisition for clinical interpretation of the  chest, abdomen, and pelvis acquired at 3 mm sections . The chest,  abdomen, and pelvis were evaluated at 5 mm sections in bone, soft  tissue, and lung windows.      The patient received 84 cc of Isovue 370 intravenously for the  examination.  Oral contrast: 500 mL Breeza.     3. 3D MIP and PET-CT fused images were processed on an independent  workstation and archived to PACS and reviewed by a radiologist.    INDICATION: Invasive breast cancer, stage IV, follow up; Metastatic  breast cancer (H)    ADDITIONAL INFORMATION OBTAINED FROM EMR: 60-year-old female with a  history of invasive lobular carcinoma of the right breast, stage IIB,  T2 N1 M0. ER and AR positive, HER-2 negative. Status post chemotherapy  and radiation, right modified radical mastectomy and flap  reconstruction and left prophylactic mastectomy and flap  reconstruction. Underlying radiation therapy to T12-L5 with 3000 cGy  fractions from 9/6/2019 to 9/18/2019. Subsequently treated with  Eribulin started on 1/28/2020 and completed 8 cycles as of 6/30/2020.  PET/CT on 11/6/2020 with progression of osseous metastatic disease,  patient was subsequently started on Trodelvy .  COVID vaccine  3/15/2021, 4/5/2021, 9/21/2021 , left deltoid.    COMPARISON:  Head CT 6/11/2021, 2/5/2021, 11/6/2020.    FINDINGS:   Multiple tubes: Left chest Port-A-Cath with tip terminating at the  cavoatrial junction.    HEAD/NECK:  There is no  suspicious FDG uptake in the neck.     The paranasal sinuses are clear. The mastoid air cells  are relatively  clear.      The mucosal pharyngeal space, the , prevertebral and carotid  spaces are within normal limits.     No masses, mass effect or pathologically enlarged lymph nodes are  evident. The thyroid gland is within normal limits.    Subcutaneous hyperattenuating lesion along the right aspect of the  occiput, non-FDG avid, most compatible with epidermoid cyst.    CHEST:  There is no suspicious FDG uptake in the chest.     Numerous prominent left FDG avid axillary lymph nodes, given recent  vaccination demonstrations of left deltoid, these are favored to be  reactive. Post surgical changes of right axillary lymph node  dissection. There is no mediastinal or hilar lymphadenopathy. Heart  size is normal. No pericardial effusion. Normal configuration of the  great vessels. Calcified subcarinal lymph nodes. There is esophageal  FDG uptake is again demonstrated. Small hiatal hernia.    The central tracheal infiltrate is patent. No pleural effusion. No  pneumothorax. No focal consolidative opacities. Minimal bibasilar  atelectasis. Stable non-hypermetabolic 2 mm right lower lobe pulmonary  nodule (series 9, image 61). No suspicious pulmonary nodules.     ABDOMEN AND PELVIS:  There is no suspicious FDG uptake in the abdomen or pelvis.    Postsurgical changes of cholecystectomy. The liver, spleen, adrenal  glands, and pancreas are within normal limits. The scattered areas of  mildly increased FDG uptake in the liver is felt to be within normal  limits. Subcentimeter hypoattenuating cystic lesion within the right  kidney. Left kidney is within normal limits. No hydronephrosis. No  dilated loops of small or large bowel. Colonic  diverticulosis without  evidence of acute diverticulitis. Appendix is normal. Urinary bladder  is within normal limits.    No free fluid or free air within the abdomen or pelvis. Major  vasculature is patent. No infrarenal abdominal aortic aneurysm. No  abdominal or pelvic lymphadenopathy.    LOWER EXTREMITIES:   No abnormal masses or hypermetabolic lesions.    BONES:   Redemonstration of extensive mixed lytic and sclerotic lesions within  the skeleton including the spine and pelvis.    Decreased uptake within a left iliac crest sclerotic lesion with  maximum SUV 3.1 (previously 4.2). Stable to minimally  decreased  uptake within a sclerotic lesion involving the posterior right ilium   with max SUV of 2.8 (previously 3.0).  Impression: IMPRESSION: In this patient with diagnosis of stage IV invasive breast  carcinoma, PET CT study demonstrates complete response to therapy:    1. No evidence of FDG avid metastatic disease within the body.  2. Incidental left axillary hyperbolic adenopathy, this is believed to  be secondary to recent Covid vaccination.  3. Multiple healed bony metastasis throughout the skeleton without  increased FDG uptake. These are consistent with treated bony  metastasis.    I have personally reviewed the examination and initial interpretation  and I agree with the findings.    SAMMY PERALTA MD         SYSTEM ID:  [id number]     Imaging was personally reviewed and interpreted by me

## 2021-10-06 NOTE — LETTER
10/6/2021         RE: Shaneka Alvarez  [address] [facility] [address]        Dear Colleague,    Thank you for referring your patient, Shaneka Alvarez, to the [facility]. Please see a copy of my visit note below.       [facility]  DEVELOPMENTAL THERAPEUTICS CLINIC  NEW PATIENT VISIT NOTE    PATIENT NAME: Shaneka Alvarez  MRN # [id number]   DATE OF VISIT: October 5, 2021  YOB: 1960     Referring Provider: Dr. Arelis Arshad, Medical Oncology  Oncologist: Dr. Dewayne Zepeda, [place]  Neurology: Dr. Quiles    CANCER TYPE: Breast adenocarcinoma, ER/KS negative, HER-2 negative  STAGE: [id number], metastatic recurrence  ECOG PS: 0    PD-L1:  NGS: [employer] on [id number] 2/2021. MSI stable, TMB 3 Mut/Mb, CCND1 amplification, CBFB splice site 165+2_165+3insT, FGF3,4, 19 amplification, CDH1 N181fs*34    Egoscue 10/12/16  BRCA 1/2 mutation negative  MSH2 VUS    SUMMARY  1/2006 Dx.   2006 AC + angie x 4 cycles --> paclitaxel angie x 4 cycles. Completed 1 year of bevacizumab 3/2007  11/2006~1/2007 RT to R breast, 5040 cGy  4505-5416 Exemestane  1/2016 R modified radical mastectomy, latissimus dorsi flap reconstruction, prophylactic L mastectomy with reconstruction    8/21/19 Orbit/brain MRI done for MS. Osseus mets - calvarial and L2  8/30/19 PET/CT. Bone, 3 cm LLL nodule, mediastinal/hilar LN  9/4/19 CT bx R iliac bone. Path: lobular breast adeno, ER/KS negative, HER-2 negative by FISH, PD-L1  <1% (TPS), PD-L1 TIL <1%,  TPS 0%, TIL 0%  9/5/19 EBUS, LLL nodule bx. Path: LLL negative for malignancy, showed chronic inflammation. Station 7 node scattered inflammatory cells, no malignancy  9/6~9/18/19 RT to T12-L5, 3000 cGy, 10 fractions for compression fracture and impending fracture at L4  10/1/19 C1 capecitabine (cytotoxic chemo #1) 2000/1500 mg, stopped after 5 doses due to nausea, anorexia, substantial vision changes. Resumed 1000 mg BID, but  stopped due to recurrence of side effects  11/5/19~12/30/19 Weekly paclitaxel (cytotoxic chemo #2) --> PD  1/28~10/20/20 Eribulin (cytotoxic chemo #3) --> SD  7/17/20 PET/CT. PD. New PE, increased T9-T10, possibly rib  11/6/20 PET/CT. PD in bones  11/11/20~current Sacituzumab govitecan (ADC). Dose reduced C4 25% due to cytopenia --> [place]  2/25/21 CT R acetabular bx. Path: lobular carcinoma, ER/[place] negative, AR 90% positive, moderate intensity by IHC, HER-2 negative by FISH  9/8/21 CA 27-29 increasing to 1176  9/24/21 PET/CT. Healed bone lesions, o/w no metastatic disease    Results for KYLE GU (MRN [id number]) as of 10/5/2021 20:58   5/26/2021 09:15 6/16/2021 09:00 7/7/2021 09:00 7/28/2021 08:31 8/17/2021 09:40 9/8/2021 10:48 9/28/2021 07:20   CA 27-29 545 (H) 465 (H) 729 (H) 801 (H) 606 (H) 1,176 (H) 824 (H)       ASSESSMENT AND PLAN   TNBC, ER/[place] negative, HER-2 negative, PD-L1 <1%. AR positive: Here to discuss the enfortumab trial. Unfortunately, she's not eligible as she's had 3 lines of prior cytotoxic therapy, and doesn't have measureable disease by RECIST and doesn't have disease progression on the most recent regimen (which is great!). We don't have other trials for her right now but will keep her on our list and let her, Dr. Arshad and Dr. Zepeda know if an opportunity arises. We reviewed that it's useful to meet early on, even if there isn't necessarily a trial open at the moment, since spots can come and go on some trials and we have to be prepared to move quickly. I discussed how the MS, while quiet and with very few flares, could be affect eligibility for immunotherapy trials. We also talked about what clinical trials are, how they operate, etc. For now, she will continue following up with Dr. Zepeda and Dr. Arshad. I asked her to let me know if any new questions arise.     PE: On apixaban.    Review of the result(s) of each unique test - CMP, CBC Pd, CA27.29  Independent interpretation of a test  performed by another physician/other qualified health care professional (not separately reported) - PET/CT 9/24/21    60 minutes spent on the date of the encounter doing chart review, history and exam, documentation and further activities per the note     Ya Mejia MD  Associate Professor of Medicine  Hematology, Oncology and Transplantation      ASHLEY Munroe is a 62 yo female who presents today to DTC clinic to discuss our enfortumab trial. She's been doing ok recently, fortunately, feeling fairly good. Had issues with increasing fatigue before but is doing ok, able to keep active. No MS flares while on chemo. Eating ok. No weight loss. No bleeding. Breathing ok. No F/C, N/V. Just a little bit of residual neuropathy from prior treatments. Of all of the prior treatments she's been through, she's thankful that the Sacituzumab govitecan has been pretty tolerable.     PAST MEDICAL HISTORY  Breast cancer as above  Multiple sclerosis. Last treatment several years ago. Was on copaxone (then became allergic). Manifest as trouble walking, lots of migraines.  PE 7/2020, incidentally identified on routine PET. 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, G1  Insomnia  Cystoid macular edema secondary to capecitabine  Cataract removal L & R 2/8/2021    CURRENT OUTPATIENT MEDICATIONS  Current Outpatient Medications   Medication Sig Dispense Refill     acetaminophen (TYLENOL) 500 MG tablet Take 2 tablets (1,000 mg) by mouth 3 times daily (Patient taking differently: Take 1,000 mg by mouth every 8 hours as needed ) 180 tablet 0     albuterol (PROAIR HFA/PROVENTIL HFA/VENTOLIN HFA) 108 (90 Base) MCG/ACT inhaler Inhale 2 puffs into the lungs       busPIRone (BUSPAR) 15 MG tablet TAKE 1 TABLET(15 MG) BY MOUTH TWICE DAILY 180 tablet 1     calcium citrate-vitamin D (CITRACAL) 315-250 MG-UNIT TABS Take 1 tablet by mouth every morning         Cholecalciferol (VITAMIN D3 PO) Take 2,000 Units by mouth every morning        ELIQUIS ANTICOAGULANT 5 MG tablet TAKE 1 TABLET(5 MG) BY MOUTH TWICE DAILY 60 tablet 3     HEMP OIL OR EXTRACT OR OTHER CBD CANNABINOID, NOT MEDICAL CANNABIS, CBD cream       ibuprofen (ADVIL/MOTRIN) 600 MG tablet Take 1 tablet (600 mg) by mouth every 6 hours as needed for moderate pain 90 tablet 3     loratadine (CLARITIN) 10 MG tablet Take 10 mg by mouth daily       LORazepam (ATIVAN) 1 MG tablet Take 1 tablet (1 mg) by mouth every 4 hours as needed for anxiety or nausea 30 tablet 0     magnesium 250 MG tablet Take 1 tablet by mouth every morning        morphine (MS CONTIN) 15 MG CR tablet Take 2 tablets (30 mg) by mouth every 12 hours 120 tablet 0     morphine (MSIR) 15 MG IR tablet Take 1-2 tablets (15-30 mg) by mouth every 3 hours as needed for pain 120 tablet 0     Nerve Stimulator (NERIVIO) CRISTOPHER 1 each daily as needed 1 each 3     OLANZapine (ZYPREXA) 5 MG tablet Take one tablet, starting the first day after chemotherapy for 5 days, then stop. Repeat for each chemotherapy cycle for nausea, vomiting.       potassium chloride ER (KLOR-CON M) 10 MEQ CR tablet TAKE 1 TABLET(10 MEQ) BY MOUTH DAILY 14 tablet 0     promethazine (PHENERGAN) 25 MG tablet Take 25 mg by mouth every 6 hours as needed       propranolol ER (INDERAL LA) 80 MG 24 hr capsule TAKE 1 CAPSULE(80 MG) BY MOUTH EVERY MORNING 90 capsule 1     senna-docusate (SENOKOT-S/PERICOLACE) 8.6-50 MG tablet Take 1-2 tablets by mouth 2 times daily as needed for constipation . Goal is to have a bowel movement every 1-2 days. (Patient taking differently: Take 1-2 tablets by mouth as needed for constipation . Goal is to have a bowel movement every 1-2 days.)       traZODone (DESYREL) 50 MG tablet TAKE 1 TABLET(50 MG) BY MOUTH AT BEDTIME 30 tablet 1     venlafaxine (EFFEXOR-XR) 75 MG 24 hr capsule Take 3 capsules (225 mg) by mouth daily 270 capsule 3     ALLERGIES  Allergies    Allergen Reactions     Bactrim [Sulfamethoxazole W/Trimethoprim]      Internal bleeding     Copaxone [Glatiramer] Hives      SOCIAL HISTORY: . Smoked, but only minimally ~ 5 years. . Three children.      FAMILY HISTORY:   Family History   Problem Relation Age of Onset     Breast Cancer Maternal Aunt 50         at 85     Breast Cancer Cousin 40     Breast Cancer Mother 49        2nd primary, contralateral breast at 69;  at 86     Melanoma Mother      Breast Cancer Maternal Grandmother 40     Ovarian Cancer Maternal Grandmother      Breast Cancer Paternal Grandmother 50         at 60     Brain Cancer Cousin 20     Breast Cancer Paternal Aunt 50         at 60     Breast Cancer Cousin 45        paternal cousin     Anesthesia Reaction No family hx of      Deep Vein Thrombosis No family hx of       REVIEW OF SYSTEMS  As above in the HPI, o/w complete 12-point ROS was negative.    PHYSICAL EXAM  BP (!) 154/92 (BP Location: Left arm, Patient Position: Sitting, Cuff Size: Adult Regular)   Pulse 67   Temp 98  F (36.7  C) (Oral)   Resp 16   Wt 66.7 kg (147 lb 1.6 oz)   SpO2 98%   BMI 26.90 kg/m    Wt Readings from Last 3 Encounters:   21 75 kg (165 lb 6.4 oz)   21 74.4 kg (164 lb)   21 74.8 kg (164 lb 14.4 oz)     GEN: NAD  EXT: no edema  NEURO: alert    LABORATORY AND IMAGING STUDIES  Results for KYLE GU (MRN [id number]) as of 10/5/2021 20:58   10/5/2021 08:50   Sodium 137   Potassium 3.4   Chloride 103   Carbon Dioxide 32   Urea Nitrogen 10   Creatinine 0.71   GFR Estimate >90   Calcium 8.6   Anion Gap 2 (L)   Magnesium 2.0   Phosphorus 4.7 (H)   Albumin 3.1 (L)   Protein Total 6.3 (L)   Bilirubin Total 0.3   Alkaline Phosphatase 63   ALT 33   AST 18   Glucose 109 (H)   WBC 2.5 (L)   Hemoglobin 10.3 (L)   Hematocrit 31.7 (L)   Platelet Count 207   RBC Count 3.63 (L)   MCV 87   MCH 28.4   MCHC 32.5   RDW 15.2 (H)   % Neutrophils 57   % Lymphocytes  24   % Monocytes 10   % Eosinophils 7   Absolute Basophils 0.0   % Basophils 1   % Myelocytes 1   Absolute Neutrophil 1.4 (L)   Absolute Lymphocytes 0.6 (L)   Absolute Monocytes 0.3   Absolute Eosinophils 0.2   Absolute Myelocytes 0.0   RBC Morphology Confirmed RBC Indices   Platelet Morphology Automated Count Confirmed. Platelet morphology is normal.     Labs were independently reviewed and interpreted by me    CT Chest/Abdomen/Pelvis w Contrast  Narrative: Combined Report of:    PET and CT on  9/24/2021 3:50 PM :    1. PET of the neck, chest, abdomen, and pelvis.  2. PET CT Fusion for Attenuation Correction and Anatomical  Localization:    3. CT of the chest, abdomen and pelvis obtained for diagnostic  interpretation.  4. 3D MIP and PET-CT fused images were processed on an independent  workstation and archived to PACS and reviewed by a radiologist.    Technique:    1. PET: The patient received 10.8 mCi of F-18-FDG; the serum glucose  was 92 prior to administration, body weight was 62 kg. Images were  evaluated in the axial, sagittal, and coronal planes as well as the  rotational whole body MIP. Images were acquired from the Vertex to the  Feet.    UPTAKE WAS MEASURED AT 65 MINUTES.     BACKGROUND:  Liver SUV max= 3.7,   Aorta Blood SUV Max: 2.7.     2. CT: Volumetric acquisition for clinical interpretation of the  chest, abdomen, and pelvis acquired at 3 mm sections . The chest,  abdomen, and pelvis were evaluated at 5 mm sections in bone, soft  tissue, and lung windows.      The patient received 84 cc of Isovue 370 intravenously for the  examination.  Oral contrast: 500 mL Breeza.     3. 3D MIP and PET-CT fused images were processed on an independent  workstation and archived to PACS and reviewed by a radiologist.    INDICATION: Invasive breast cancer, stage IV, follow up; Metastatic  breast cancer (H)    ADDITIONAL INFORMATION OBTAINED FROM EMR: 60-year-old female with a  history of invasive lobular carcinoma of  the right breast, stage IIB,  T2 N1 M0. ER and [place] positive, HER-2 negative. Status post chemotherapy  and radiation, right modified radical mastectomy and flap  reconstruction and left prophylactic mastectomy and flap  reconstruction. Underlying radiation therapy to T12-L5 with 3000 cGy  fractions from 9/6/2019 to 9/18/2019. Subsequently treated with  Eribulin started on 1/28/2020 and completed 8 cycles as of 6/30/2020.  PET/CT on 11/6/2020 with progression of osseous metastatic disease,  patient was subsequently started on Trodelvy .  COVID vaccine  3/15/2021, 4/5/2021, 9/21/2021 , left deltoid.    COMPARISON: Head CT 6/11/2021, 2/5/2021, 11/6/2020.    FINDINGS:   Multiple tubes: Left chest Port-A-Cath with tip terminating at the  cavoatrial junction.    HEAD/NECK:  There is no  suspicious FDG uptake in the neck.     The paranasal sinuses are clear. The mastoid air cells  are relatively  clear.      The mucosal pharyngeal space, the , prevertebral and carotid  spaces are within normal limits.     No masses, mass effect or pathologically enlarged lymph nodes are  evident. The thyroid gland is within normal limits.    Subcutaneous hyperattenuating lesion along the right aspect of the  occiput, non-FDG avid, most compatible with epidermoid cyst.    CHEST:  There is no suspicious FDG uptake in the chest.     Numerous prominent left FDG avid axillary lymph nodes, given recent  vaccination demonstrations of left deltoid, these are favored to be  reactive. Post surgical changes of right axillary lymph node  dissection. There is no mediastinal or hilar lymphadenopathy. Heart  size is normal. No pericardial effusion. Normal configuration of the  great vessels. Calcified subcarinal lymph nodes. There is esophageal  FDG uptake is again demonstrated. Small hiatal hernia.    The central tracheal infiltrate is patent. No pleural effusion. No  pneumothorax. No focal consolidative opacities. Minimal  bibasilar  atelectasis. Stable non-hypermetabolic 2 mm right lower lobe pulmonary  nodule (series 9, image 61). No suspicious pulmonary nodules.     ABDOMEN AND PELVIS:  There is no suspicious FDG uptake in the abdomen or pelvis.    Postsurgical changes of cholecystectomy. The liver, spleen, adrenal  glands, and pancreas are within normal limits. The scattered areas of  mildly increased FDG uptake in the liver is felt to be within normal  limits. Subcentimeter hypoattenuating cystic lesion within the right  kidney. Left kidney is within normal limits. No hydronephrosis. No  dilated loops of small or large bowel. Colonic diverticulosis without  evidence of acute diverticulitis. Appendix is normal. Urinary bladder  is within normal limits.    No free fluid or free air within the abdomen or pelvis. Major  vasculature is patent. No infrarenal abdominal aortic aneurysm. No  abdominal or pelvic lymphadenopathy.    LOWER EXTREMITIES:   No abnormal masses or hypermetabolic lesions.    BONES:   Redemonstration of extensive mixed lytic and sclerotic lesions within  the skeleton including the spine and pelvis.    Decreased uptake within a left iliac crest sclerotic lesion with  maximum SUV 3.1 (previously 4.2). Stable to minimally  decreased  uptake within a sclerotic lesion involving the posterior right ilium   with max SUV of 2.8 (previously 3.0).  Impression: IMPRESSION: In this patient with diagnosis of stage IV invasive breast  carcinoma, PET CT study demonstrates complete response to therapy:    1. No evidence of FDG avid metastatic disease within the body.  2. Incidental left axillary hyperbolic adenopathy, this is believed to  be secondary to recent Covid vaccination.  3. Multiple healed bony metastasis throughout the skeleton without  increased FDG uptake. These are consistent with treated bony  metastasis.    I have personally reviewed the examination and initial interpretation  and I agree with the  findings.    SAMMY PERALTA MD         SYSTEM ID:  [id number]  PET Oncology Whole Body  Narrative: Combined Report of:    PET and CT on  9/24/2021 3:50 PM :    1. PET of the neck, chest, abdomen, and pelvis.  2. PET CT Fusion for Attenuation Correction and Anatomical  Localization:    3. CT of the chest, abdomen and pelvis obtained for diagnostic  interpretation.  4. 3D MIP and PET-CT fused images were processed on an independent  workstation and archived to PACS and reviewed by a radiologist.    Technique:    1. PET: The patient received 10.8 mCi of F-18-FDG; the serum glucose  was 92 prior to administration, body weight was 62 kg. Images were  evaluated in the axial, sagittal, and coronal planes as well as the  rotational whole body MIP. Images were acquired from the Vertex to the  Feet.    UPTAKE WAS MEASURED AT 65 MINUTES.     BACKGROUND:  Liver SUV max= 3.7,   Aorta Blood SUV Max: 2.7.     2. CT: Volumetric acquisition for clinical interpretation of the  chest, abdomen, and pelvis acquired at 3 mm sections . The chest,  abdomen, and pelvis were evaluated at 5 mm sections in bone, soft  tissue, and lung windows.      The patient received 84 cc of Isovue 370 intravenously for the  examination.  Oral contrast: 500 mL Breeza.     3. 3D MIP and PET-CT fused images were processed on an independent  workstation and archived to PACS and reviewed by a radiologist.    INDICATION: Invasive breast cancer, stage IV, follow up; Metastatic  breast cancer (H)    ADDITIONAL INFORMATION OBTAINED FROM EMR: 60-year-old female with a  history of invasive lobular carcinoma of the right breast, stage IIB,  T2 N1 M0. ER and NC positive, HER-2 negative. Status post chemotherapy  and radiation, right modified radical mastectomy and flap  reconstruction and left prophylactic mastectomy and flap  reconstruction. Underlying radiation therapy to T12-L5 with 3000 cGy  fractions from 9/6/2019 to 9/18/2019. Subsequently treated with  Eribulin  started on 1/28/2020 and completed 8 cycles as of 6/30/2020.  PET/CT on 11/6/2020 with progression of osseous metastatic disease,  patient was subsequently started on Trodelvy .  COVID vaccine  3/15/2021, 4/5/2021, 9/21/2021 , left deltoid.    COMPARISON: Head CT 6/11/2021, 2/5/2021, 11/6/2020.    FINDINGS:   Multiple tubes: Left chest Port-A-Cath with tip terminating at the  cavoatrial junction.    HEAD/NECK:  There is no  suspicious FDG uptake in the neck.     The paranasal sinuses are clear. The mastoid air cells  are relatively  clear.      The mucosal pharyngeal space, the , prevertebral and carotid  spaces are within normal limits.     No masses, mass effect or pathologically enlarged lymph nodes are  evident. The thyroid gland is within normal limits.    Subcutaneous hyperattenuating lesion along the right aspect of the  occiput, non-FDG avid, most compatible with epidermoid cyst.    CHEST:  There is no suspicious FDG uptake in the chest.     Numerous prominent left FDG avid axillary lymph nodes, given recent  vaccination demonstrations of left deltoid, these are favored to be  reactive. Post surgical changes of right axillary lymph node  dissection. There is no mediastinal or hilar lymphadenopathy. Heart  size is normal. No pericardial effusion. Normal configuration of the  great vessels. Calcified subcarinal lymph nodes. There is esophageal  FDG uptake is again demonstrated. Small hiatal hernia.    The central tracheal infiltrate is patent. No pleural effusion. No  pneumothorax. No focal consolidative opacities. Minimal bibasilar  atelectasis. Stable non-hypermetabolic 2 mm right lower lobe pulmonary  nodule (series 9, image 61). No suspicious pulmonary nodules.     ABDOMEN AND PELVIS:  There is no suspicious FDG uptake in the abdomen or pelvis.    Postsurgical changes of cholecystectomy. The liver, spleen, adrenal  glands, and pancreas are within normal limits. The scattered areas of  mildly  increased FDG uptake in the liver is felt to be within normal  limits. Subcentimeter hypoattenuating cystic lesion within the right  kidney. Left kidney is within normal limits. No hydronephrosis. No  dilated loops of small or large bowel. Colonic diverticulosis without  evidence of acute diverticulitis. Appendix is normal. Urinary bladder  is within normal limits.    No free fluid or free air within the abdomen or pelvis. Major  vasculature is patent. No infrarenal abdominal aortic aneurysm. No  abdominal or pelvic lymphadenopathy.    LOWER EXTREMITIES:   No abnormal masses or hypermetabolic lesions.    BONES:   Redemonstration of extensive mixed lytic and sclerotic lesions within  the skeleton including the spine and pelvis.    Decreased uptake within a left iliac crest sclerotic lesion with  maximum SUV 3.1 (previously 4.2). Stable to minimally  decreased  uptake within a sclerotic lesion involving the posterior right ilium   with max SUV of 2.8 (previously 3.0).  Impression: IMPRESSION: In this patient with diagnosis of stage IV invasive breast  carcinoma, PET CT study demonstrates complete response to therapy:    1. No evidence of FDG avid metastatic disease within the body.  2. Incidental left axillary hyperbolic adenopathy, this is believed to  be secondary to recent Covid vaccination.  3. Multiple healed bony metastasis throughout the skeleton without  increased FDG uptake. These are consistent with treated bony  metastasis.    I have personally reviewed the examination and initial interpretation  and I agree with the findings.    SAMMY PERALTA MD         SYSTEM ID:  [id number]     Imaging was personally reviewed and interpreted by me         Again, thank you for allowing me to participate in the care of your patient.        Sincerely,        Ya Mejia MD

## 2021-10-06 NOTE — NURSING NOTE
"Oncology Rooming Note    October 6, 2021 10:28 AM   Shaneka Alvarez is a 61 year old female who presents for:    Chief Complaint   Patient presents with     Oncology Clinic Visit     BREAST CANCER     Initial Vitals: BP (!) 154/92 (BP Location: Left arm, Patient Position: Sitting, Cuff Size: Adult Regular)   Pulse 67   Temp 98  F (36.7  C) (Oral)   Resp 16   Wt 66.7 kg (147 lb 1.6 oz)   SpO2 98%   BMI 26.90 kg/m   Estimated body mass index is 26.9 kg/m  as calculated from the following:    Height as of 9/29/21: 1.575 m (5' 2\").    Weight as of this encounter: 66.7 kg (147 lb 1.6 oz). Body surface area is 1.71 meters squared.  No Pain (0) Comment: Data Unavailable   No LMP recorded. Patient is postmenopausal.  Allergies reviewed: Yes  Medications reviewed: Yes    Medications: Medication refills not needed today.  Pharmacy name entered into [facility]:    [id number] - [place], [place] - [address] [id number] - [place] - [address] [facility] [address]  [facility] - [place], [place] - [address] [facility] - [place], [place] - [address] [facility] - [place], [place] - [address] [facility]    Clinical concerns: None.        Genny Negrete CMA              "

## 2021-10-12 ENCOUNTER — PATIENT OUTREACH (OUTPATIENT)
Dept: ONCOLOGY | Facility: CLINIC | Age: 61
End: 2021-10-12

## 2021-10-12 NOTE — PROGRESS NOTES
[facility] paperwork completed for patient's daughter, Nemesio.  FAX sent to [employer] @ [phone].  Completed from 10/12/21 to 4/12/21.

## 2021-10-13 DIAGNOSIS — C50.919 METASTATIC BREAST CANCER: ICD-10-CM

## 2021-10-13 DIAGNOSIS — E87.6 HYPOKALEMIA: ICD-10-CM

## 2021-10-13 DIAGNOSIS — D70.1 CHEMOTHERAPY-INDUCED NEUTROPENIA (H): Primary | ICD-10-CM

## 2021-10-13 DIAGNOSIS — T45.1X5A CHEMOTHERAPY-INDUCED NEUTROPENIA (H): Primary | ICD-10-CM

## 2021-10-13 DIAGNOSIS — C50.911 BILATERAL MALIGNANT NEOPLASM OF BREAST IN FEMALE, UNSPECIFIED ESTROGEN RECEPTOR STATUS, UNSPECIFIED SITE OF BREAST (H): ICD-10-CM

## 2021-10-13 DIAGNOSIS — C50.912 BILATERAL MALIGNANT NEOPLASM OF BREAST IN FEMALE, UNSPECIFIED ESTROGEN RECEPTOR STATUS, UNSPECIFIED SITE OF BREAST (H): ICD-10-CM

## 2021-10-13 RX ORDER — ALBUTEROL SULFATE 90 UG/1
1-2 AEROSOL, METERED RESPIRATORY (INHALATION)
Status: CANCELLED
Start: 2021-10-26

## 2021-10-13 RX ORDER — DIPHENHYDRAMINE HYDROCHLORIDE 50 MG/ML
50 INJECTION INTRAMUSCULAR; INTRAVENOUS
Status: CANCELLED
Start: 2021-10-26

## 2021-10-13 RX ORDER — EPINEPHRINE 1 MG/ML
0.3 INJECTION, SOLUTION INTRAMUSCULAR; SUBCUTANEOUS EVERY 5 MIN PRN
Status: CANCELLED | OUTPATIENT
Start: 2021-10-26

## 2021-10-13 RX ORDER — METHYLPREDNISOLONE SODIUM SUCCINATE 125 MG/2ML
125 INJECTION, POWDER, LYOPHILIZED, FOR SOLUTION INTRAMUSCULAR; INTRAVENOUS
Status: CANCELLED
Start: 2021-10-26

## 2021-10-13 RX ORDER — SODIUM CHLORIDE 9 MG/ML
1000 INJECTION, SOLUTION INTRAVENOUS CONTINUOUS PRN
Status: CANCELLED
Start: 2021-10-25

## 2021-10-13 RX ORDER — MEPERIDINE HYDROCHLORIDE 25 MG/ML
25 INJECTION INTRAMUSCULAR; INTRAVENOUS; SUBCUTANEOUS EVERY 30 MIN PRN
Status: CANCELLED | OUTPATIENT
Start: 2021-10-25

## 2021-10-13 RX ORDER — HEPARIN SODIUM (PORCINE) LOCK FLUSH IV SOLN 100 UNIT/ML 100 UNIT/ML
5 SOLUTION INTRAVENOUS
Status: CANCELLED | OUTPATIENT
Start: 2021-10-26

## 2021-10-13 RX ORDER — ALBUTEROL SULFATE 90 UG/1
1-2 AEROSOL, METERED RESPIRATORY (INHALATION)
Status: CANCELLED
Start: 2021-10-25

## 2021-10-13 RX ORDER — DIPHENHYDRAMINE HYDROCHLORIDE 50 MG/ML
50 INJECTION INTRAMUSCULAR; INTRAVENOUS
Status: CANCELLED
Start: 2021-10-25

## 2021-10-13 RX ORDER — NALOXONE HYDROCHLORIDE 0.4 MG/ML
.1-.4 INJECTION, SOLUTION INTRAMUSCULAR; INTRAVENOUS; SUBCUTANEOUS
Status: CANCELLED | OUTPATIENT
Start: 2021-10-26

## 2021-10-13 RX ORDER — MEPERIDINE HYDROCHLORIDE 25 MG/ML
25 INJECTION INTRAMUSCULAR; INTRAVENOUS; SUBCUTANEOUS EVERY 30 MIN PRN
Status: CANCELLED | OUTPATIENT
Start: 2021-10-26

## 2021-10-13 RX ORDER — SODIUM CHLORIDE 9 MG/ML
1000 INJECTION, SOLUTION INTRAVENOUS CONTINUOUS PRN
Status: CANCELLED
Start: 2021-10-26

## 2021-10-13 RX ORDER — METHYLPREDNISOLONE SODIUM SUCCINATE 125 MG/2ML
125 INJECTION, POWDER, LYOPHILIZED, FOR SOLUTION INTRAMUSCULAR; INTRAVENOUS
Status: CANCELLED
Start: 2021-10-25

## 2021-10-13 RX ORDER — ALBUTEROL SULFATE 0.83 MG/ML
2.5 SOLUTION RESPIRATORY (INHALATION)
Status: CANCELLED | OUTPATIENT
Start: 2021-10-25

## 2021-10-13 RX ORDER — NALOXONE HYDROCHLORIDE 0.4 MG/ML
.1-.4 INJECTION, SOLUTION INTRAMUSCULAR; INTRAVENOUS; SUBCUTANEOUS
Status: CANCELLED | OUTPATIENT
Start: 2021-10-25

## 2021-10-13 RX ORDER — HEPARIN SODIUM,PORCINE 10 UNIT/ML
5 VIAL (ML) INTRAVENOUS
Status: CANCELLED | OUTPATIENT
Start: 2021-10-25

## 2021-10-13 RX ORDER — ATROPINE SULFATE 0.4 MG/ML
0.4 AMPUL (ML) INJECTION
Status: CANCELLED | OUTPATIENT
Start: 2021-10-25

## 2021-10-13 RX ORDER — EPINEPHRINE 1 MG/ML
0.3 INJECTION, SOLUTION INTRAMUSCULAR; SUBCUTANEOUS EVERY 5 MIN PRN
Status: CANCELLED | OUTPATIENT
Start: 2021-10-25

## 2021-10-13 RX ORDER — ACETAMINOPHEN 325 MG/1
650 TABLET ORAL ONCE
Status: CANCELLED | OUTPATIENT
Start: 2021-10-26

## 2021-10-13 RX ORDER — ALBUTEROL SULFATE 0.83 MG/ML
2.5 SOLUTION RESPIRATORY (INHALATION)
Status: CANCELLED | OUTPATIENT
Start: 2021-10-26

## 2021-10-13 RX ORDER — LORAZEPAM 2 MG/ML
0.5 INJECTION INTRAMUSCULAR EVERY 4 HOURS PRN
Status: CANCELLED
Start: 2021-10-26

## 2021-10-13 RX ORDER — LORAZEPAM 2 MG/ML
0.5 INJECTION INTRAMUSCULAR EVERY 4 HOURS PRN
Status: CANCELLED
Start: 2021-10-25

## 2021-10-13 RX ORDER — DIPHENHYDRAMINE HCL 25 MG
50 CAPSULE ORAL ONCE
Status: CANCELLED | OUTPATIENT
Start: 2021-10-25

## 2021-10-13 RX ORDER — HEPARIN SODIUM,PORCINE 10 UNIT/ML
5 VIAL (ML) INTRAVENOUS
Status: CANCELLED | OUTPATIENT
Start: 2021-10-26

## 2021-10-13 RX ORDER — DIPHENHYDRAMINE HCL 25 MG
50 CAPSULE ORAL ONCE
Status: CANCELLED | OUTPATIENT
Start: 2021-10-26

## 2021-10-13 RX ORDER — ACETAMINOPHEN 325 MG/1
650 TABLET ORAL ONCE
Status: CANCELLED | OUTPATIENT
Start: 2021-10-25

## 2021-10-13 RX ORDER — ATROPINE SULFATE 0.4 MG/ML
0.4 AMPUL (ML) INJECTION
Status: CANCELLED | OUTPATIENT
Start: 2021-10-26

## 2021-10-13 RX ORDER — HEPARIN SODIUM (PORCINE) LOCK FLUSH IV SOLN 100 UNIT/ML 100 UNIT/ML
5 SOLUTION INTRAVENOUS
Status: CANCELLED | OUTPATIENT
Start: 2021-10-25

## 2021-10-17 DIAGNOSIS — E87.6 HYPOKALEMIA: ICD-10-CM

## 2021-10-18 ENCOUNTER — VIRTUAL VISIT (OUTPATIENT)
Dept: ONCOLOGY | Facility: CLINIC | Age: 61
End: 2021-10-18
Payer: COMMERCIAL

## 2021-10-18 DIAGNOSIS — C50.919 METASTATIC BREAST CANCER: Primary | ICD-10-CM

## 2021-10-18 DIAGNOSIS — C79.51 BONE METASTASES: ICD-10-CM

## 2021-10-18 DIAGNOSIS — G35 MULTIPLE SCLEROSIS (H): ICD-10-CM

## 2021-10-18 DIAGNOSIS — C79.51 BONE METASTASES: Primary | ICD-10-CM

## 2021-10-18 DIAGNOSIS — C50.919 METASTATIC BREAST CANCER: ICD-10-CM

## 2021-10-18 DIAGNOSIS — G89.3 CANCER RELATED PAIN: ICD-10-CM

## 2021-10-18 DIAGNOSIS — R11.2 DRUG-INDUCED NAUSEA AND VOMITING: ICD-10-CM

## 2021-10-18 DIAGNOSIS — I26.99 PULMONARY EMBOLISM WITHOUT ACUTE COR PULMONALE, UNSPECIFIED CHRONICITY, UNSPECIFIED PULMONARY EMBOLISM TYPE (H): ICD-10-CM

## 2021-10-18 DIAGNOSIS — D64.9 ANEMIA, UNSPECIFIED TYPE: ICD-10-CM

## 2021-10-18 DIAGNOSIS — T50.905A DRUG-INDUCED NAUSEA AND VOMITING: ICD-10-CM

## 2021-10-18 PROCEDURE — 99214 OFFICE O/P EST MOD 30 MIN: CPT | Mod: GT | Performed by: NURSE PRACTITIONER
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
## 2021-10-19 ENCOUNTER — LAB (OUTPATIENT)
Dept: ONCOLOGY | Facility: CLINIC | Age: 61
End: 2021-10-19
Payer: COMMERCIAL

## 2021-10-19 ENCOUNTER — INFUSION THERAPY VISIT (OUTPATIENT)
Dept: INFUSION THERAPY | Facility: CLINIC | Age: 61
End: 2021-10-19
Payer: COMMERCIAL

## 2021-10-19 VITALS
RESPIRATION RATE: 18 BRPM | DIASTOLIC BLOOD PRESSURE: 86 MMHG | WEIGHT: 139.7 LBS | BODY MASS INDEX: 25.55 KG/M2 | OXYGEN SATURATION: 100 % | HEART RATE: 75 BPM | SYSTOLIC BLOOD PRESSURE: 130 MMHG | TEMPERATURE: 97.5 F

## 2021-10-19 DIAGNOSIS — C50.911 BILATERAL MALIGNANT NEOPLASM OF BREAST IN FEMALE, UNSPECIFIED ESTROGEN RECEPTOR STATUS, UNSPECIFIED SITE OF BREAST (H): ICD-10-CM

## 2021-10-19 DIAGNOSIS — C50.912 BILATERAL MALIGNANT NEOPLASM OF BREAST IN FEMALE, UNSPECIFIED ESTROGEN RECEPTOR STATUS, UNSPECIFIED SITE OF BREAST (H): ICD-10-CM

## 2021-10-19 DIAGNOSIS — C50.919 METASTATIC BREAST CANCER: ICD-10-CM

## 2021-10-19 DIAGNOSIS — T45.1X5A CHEMOTHERAPY-INDUCED NEUTROPENIA (H): Primary | ICD-10-CM

## 2021-10-19 DIAGNOSIS — E87.6 HYPOKALEMIA: Primary | ICD-10-CM

## 2021-10-19 DIAGNOSIS — D70.1 CHEMOTHERAPY-INDUCED NEUTROPENIA (H): ICD-10-CM

## 2021-10-19 DIAGNOSIS — T45.1X5A CHEMOTHERAPY-INDUCED NEUTROPENIA (H): ICD-10-CM

## 2021-10-19 DIAGNOSIS — D70.1 CHEMOTHERAPY-INDUCED NEUTROPENIA (H): Primary | ICD-10-CM

## 2021-10-19 DIAGNOSIS — E87.6 HYPOKALEMIA: ICD-10-CM

## 2021-10-19 LAB
ALBUMIN SERPL-MCNC: 3.3 G/DL (ref 3.4–5)
ALP SERPL-CCNC: 72 U/L (ref 40–150)
ALT SERPL W P-5'-P-CCNC: 36 U/L (ref 0–50)
ANION GAP SERPL CALCULATED.3IONS-SCNC: 2 MMOL/L (ref 3–14)
AST SERPL W P-5'-P-CCNC: 27 U/L (ref 0–45)
BASOPHILS # BLD AUTO: 0.1 10E3/UL (ref 0–0.2)
BASOPHILS NFR BLD AUTO: 2 %
BILIRUB SERPL-MCNC: 0.3 MG/DL (ref 0.2–1.3)
BUN SERPL-MCNC: 5 MG/DL (ref 7–30)
CALCIUM SERPL-MCNC: 8 MG/DL (ref 8.5–10.1)
CANCER AG27-29 SERPL-ACNC: 1023 U/ML (ref 0–39)
CHLORIDE BLD-SCNC: 107 MMOL/L (ref 94–109)
CO2 SERPL-SCNC: 28 MMOL/L (ref 20–32)
CREAT SERPL-MCNC: 0.66 MG/DL (ref 0.52–1.04)
EOSINOPHIL # BLD AUTO: 0.2 10E3/UL (ref 0–0.7)
EOSINOPHIL NFR BLD AUTO: 5 %
ERYTHROCYTE [DISTWIDTH] IN BLOOD BY AUTOMATED COUNT: 15.2 % (ref 10–15)
GFR SERPL CREATININE-BSD FRML MDRD: >90 ML/MIN/1.73M2
GLUCOSE BLD-MCNC: 119 MG/DL (ref 70–99)
HCT VFR BLD AUTO: 36 % (ref 35–47)
HGB BLD-MCNC: 11.7 G/DL (ref 11.7–15.7)
IMM GRANULOCYTES # BLD: 0 10E3/UL
IMM GRANULOCYTES NFR BLD: 1 %
LYMPHOCYTES # BLD AUTO: 0.5 10E3/UL (ref 0.8–5.3)
LYMPHOCYTES NFR BLD AUTO: 15 %
MAGNESIUM SERPL-MCNC: 2.2 MG/DL (ref 1.6–2.3)
MCH RBC QN AUTO: 28.5 PG (ref 26.5–33)
MCHC RBC AUTO-ENTMCNC: 32.5 G/DL (ref 31.5–36.5)
MCV RBC AUTO: 88 FL (ref 78–100)
MONOCYTES # BLD AUTO: 0.5 10E3/UL (ref 0–1.3)
MONOCYTES NFR BLD AUTO: 15 %
NEUTROPHILS # BLD AUTO: 2.2 10E3/UL (ref 1.6–8.3)
NEUTROPHILS NFR BLD AUTO: 62 %
NRBC # BLD AUTO: 0 10E3/UL
NRBC BLD AUTO-RTO: 0 /100
PHOSPHATE SERPL-MCNC: 3.2 MG/DL (ref 2.5–4.5)
PLATELET # BLD AUTO: 205 10E3/UL (ref 150–450)
POTASSIUM BLD-SCNC: 4 MMOL/L (ref 3.4–5.3)
PROT SERPL-MCNC: 6.5 G/DL (ref 6.8–8.8)
RBC # BLD AUTO: 4.1 10E6/UL (ref 3.8–5.2)
SODIUM SERPL-SCNC: 137 MMOL/L (ref 133–144)
WBC # BLD AUTO: 3.4 10E3/UL (ref 4–11)

## 2021-10-19 PROCEDURE — 96367 TX/PROPH/DG ADDL SEQ IV INF: CPT | Performed by: INTERNAL MEDICINE

## 2021-10-19 PROCEDURE — 80053 COMPREHEN METABOLIC PANEL: CPT | Performed by: INTERNAL MEDICINE

## 2021-10-19 PROCEDURE — 84100 ASSAY OF PHOSPHORUS: CPT | Performed by: INTERNAL MEDICINE

## 2021-10-19 PROCEDURE — 83735 ASSAY OF MAGNESIUM: CPT | Performed by: INTERNAL MEDICINE

## 2021-10-19 PROCEDURE — 96375 TX/PRO/DX INJ NEW DRUG ADDON: CPT | Performed by: INTERNAL MEDICINE

## 2021-10-19 PROCEDURE — S0028 INJECTION, FAMOTIDINE, 20 MG: HCPCS | Performed by: INTERNAL MEDICINE

## 2021-10-19 PROCEDURE — 99207 PR NO CHARGE NURSE ONLY: CPT

## 2021-10-19 PROCEDURE — 85025 COMPLETE CBC W/AUTO DIFF WBC: CPT | Performed by: INTERNAL MEDICINE

## 2021-10-19 PROCEDURE — 90471 IMMUNIZATION ADMIN: CPT | Performed by: INTERNAL MEDICINE

## 2021-10-19 PROCEDURE — 90682 RIV4 VACC RECOMBINANT DNA IM: CPT | Performed by: INTERNAL MEDICINE

## 2021-10-19 PROCEDURE — 99207 PR NO CHARGE LOS: CPT

## 2021-10-19 PROCEDURE — 86300 IMMUNOASSAY TUMOR CA 15-3: CPT | Performed by: INTERNAL MEDICINE

## 2021-10-19 PROCEDURE — 96413 CHEMO IV INFUSION 1 HR: CPT | Performed by: INTERNAL MEDICINE

## 2021-10-19 RX ORDER — HEPARIN SODIUM (PORCINE) LOCK FLUSH IV SOLN 100 UNIT/ML 100 UNIT/ML
5 SOLUTION INTRAVENOUS
Status: DISCONTINUED | OUTPATIENT
Start: 2021-10-19 | End: 2021-10-19 | Stop reason: HOSPADM

## 2021-10-19 RX ORDER — POTASSIUM CHLORIDE 750 MG/1
TABLET, EXTENDED RELEASE ORAL
Qty: 14 TABLET | Refills: 0 | Status: SHIPPED | OUTPATIENT
Start: 2021-10-19 | End: 2021-11-01

## 2021-10-19 RX ORDER — ACETAMINOPHEN 325 MG/1
650 TABLET ORAL ONCE
Status: COMPLETED | OUTPATIENT
Start: 2021-10-19 | End: 2021-10-19

## 2021-10-19 RX ORDER — HEPARIN SODIUM (PORCINE) LOCK FLUSH IV SOLN 100 UNIT/ML 100 UNIT/ML
5 SOLUTION INTRAVENOUS EVERY 8 HOURS
Status: DISCONTINUED | OUTPATIENT
Start: 2021-10-19 | End: 2021-10-19 | Stop reason: HOSPADM

## 2021-10-19 RX ORDER — DIPHENHYDRAMINE HCL 25 MG
50 CAPSULE ORAL ONCE
Status: COMPLETED | OUTPATIENT
Start: 2021-10-19 | End: 2021-10-19

## 2021-10-19 RX ADMIN — HEPARIN SODIUM (PORCINE) LOCK FLUSH IV SOLN 100 UNIT/ML 5 ML: 100 SOLUTION at 11:06

## 2021-10-19 RX ADMIN — HEPARIN SODIUM (PORCINE) LOCK FLUSH IV SOLN 100 UNIT/ML 5 ML: 100 SOLUTION at 07:50

## 2021-10-19 RX ADMIN — ACETAMINOPHEN 650 MG: 325 TABLET ORAL at 08:27

## 2021-10-19 RX ADMIN — Medication 500 ML: at 08:26

## 2021-10-19 RX ADMIN — Medication 50 MG: at 08:27

## 2021-10-19 ASSESSMENT — PAIN SCALES - GENERAL: PAINLEVEL: NO PAIN (0)

## 2021-10-19 NOTE — PROGRESS NOTES
Infusion Nursing Note:  Shaneka Alvarez presents today for C17 of Trodelvy.    Patient seen by provider today: No   present during visit today: Not Applicable.    Note: Pt states she is doing well, denies any changes since her last infusion, states she is tolerating the Trodelvy at 1 hour infusion time, will treat as ordered.  Pt requesting FLU vaccine today - denies any problems with prior vaccines.    Intravenous Access:  Implanted Port.    Treatment Conditions:  Lab Results   Component Value Date    HGB 11.7 10/19/2021    WBC 3.4 (L) 10/19/2021    ANEU 1.4 (L) 10/05/2021    ANEUTAUTO 2.2 10/19/2021     10/19/2021      Lab Results   Component Value Date     10/19/2021    POTASSIUM 4.0 10/19/2021    MAG 2.2 10/19/2021    CR 0.66 10/19/2021    RAFAELA 8.0 (L) 10/19/2021    BILITOTAL 0.3 10/19/2021    ALBUMIN 3.3 (L) 10/19/2021    ALT 36 10/19/2021    AST 27 10/19/2021     Results reviewed, labs MET treatment parameters, ok to proceed with treatment.      Post Infusion Assessment:  Patient tolerated infusion without incident.  Patient observed for 30 minutes post Trodelvey per protocol.  Site patent and intact, free from redness, edema or discomfort.  No evidence of extravasations.  Access discontinued per protocol.       Discharge Plan:   Return 10/26/2021 for C17,D8 and then follow up with 11/09 with Dr Zepeda and C18 of Trodelvy.  Discharge instructions reviewed with: Patient.  Patient and/or family verbalized understanding of discharge instructions and all questions answered.  Patient discharged in stable condition accompanied by: self.  Departure Mode: Ambulatory.      Loren Costa, RN

## 2021-10-19 NOTE — PROGRESS NOTES
Port needle left for access for treatment, Sterile technique performed and maintained. Patient tolerated procedure well. Tubes drawn in rainbow order: red, green, purple. Transparent dressing placed with use of tegaderm.    Iveth Durham RN  [facility] Oncology/Infusion [place]

## 2021-10-26 ENCOUNTER — LAB (OUTPATIENT)
Dept: ONCOLOGY | Facility: CLINIC | Age: 61
End: 2021-10-26
Payer: COMMERCIAL

## 2021-10-26 ENCOUNTER — INFUSION THERAPY VISIT (OUTPATIENT)
Dept: INFUSION THERAPY | Facility: CLINIC | Age: 61
End: 2021-10-26
Payer: COMMERCIAL

## 2021-10-26 VITALS
DIASTOLIC BLOOD PRESSURE: 83 MMHG | OXYGEN SATURATION: 97 % | RESPIRATION RATE: 16 BRPM | BODY MASS INDEX: 25.06 KG/M2 | WEIGHT: 137 LBS | HEART RATE: 85 BPM | SYSTOLIC BLOOD PRESSURE: 116 MMHG | TEMPERATURE: 98 F

## 2021-10-26 DIAGNOSIS — C50.912 BILATERAL MALIGNANT NEOPLASM OF BREAST IN FEMALE, UNSPECIFIED ESTROGEN RECEPTOR STATUS, UNSPECIFIED SITE OF BREAST (H): ICD-10-CM

## 2021-10-26 DIAGNOSIS — T45.1X5A CHEMOTHERAPY-INDUCED NEUTROPENIA (H): Primary | ICD-10-CM

## 2021-10-26 DIAGNOSIS — C50.911 BILATERAL MALIGNANT NEOPLASM OF BREAST IN FEMALE, UNSPECIFIED ESTROGEN RECEPTOR STATUS, UNSPECIFIED SITE OF BREAST (H): ICD-10-CM

## 2021-10-26 DIAGNOSIS — C79.51 BONE METASTASES: ICD-10-CM

## 2021-10-26 DIAGNOSIS — E87.6 HYPOKALEMIA: Primary | ICD-10-CM

## 2021-10-26 DIAGNOSIS — C50.919 METASTATIC BREAST CANCER: ICD-10-CM

## 2021-10-26 DIAGNOSIS — D70.1 CHEMOTHERAPY-INDUCED NEUTROPENIA (H): ICD-10-CM

## 2021-10-26 DIAGNOSIS — T45.1X5A CHEMOTHERAPY-INDUCED NEUTROPENIA (H): ICD-10-CM

## 2021-10-26 DIAGNOSIS — E87.6 HYPOKALEMIA: ICD-10-CM

## 2021-10-26 DIAGNOSIS — D70.1 CHEMOTHERAPY-INDUCED NEUTROPENIA (H): Primary | ICD-10-CM

## 2021-10-26 LAB
ALBUMIN SERPL-MCNC: 3.7 G/DL (ref 3.4–5)
ALP SERPL-CCNC: 82 U/L (ref 40–150)
ALT SERPL W P-5'-P-CCNC: 34 U/L (ref 0–50)
ANION GAP SERPL CALCULATED.3IONS-SCNC: 5 MMOL/L (ref 3–14)
AST SERPL W P-5'-P-CCNC: 21 U/L (ref 0–45)
BASOPHILS # BLD MANUAL: 0 10E3/UL (ref 0–0.2)
BASOPHILS NFR BLD MANUAL: 0 %
BILIRUB SERPL-MCNC: 0.3 MG/DL (ref 0.2–1.3)
BUN SERPL-MCNC: 16 MG/DL (ref 7–30)
CALCIUM SERPL-MCNC: 9 MG/DL (ref 8.5–10.1)
CHLORIDE BLD-SCNC: 101 MMOL/L (ref 94–109)
CO2 SERPL-SCNC: 29 MMOL/L (ref 20–32)
CREAT SERPL-MCNC: 0.8 MG/DL (ref 0.52–1.04)
EOSINOPHIL # BLD MANUAL: 0.1 10E3/UL (ref 0–0.7)
EOSINOPHIL NFR BLD MANUAL: 4 %
ERYTHROCYTE [DISTWIDTH] IN BLOOD BY AUTOMATED COUNT: 14.7 % (ref 10–15)
GFR SERPL CREATININE-BSD FRML MDRD: 81 ML/MIN/1.73M2
GLUCOSE BLD-MCNC: 112 MG/DL (ref 70–99)
HCT VFR BLD AUTO: 38.1 % (ref 35–47)
HGB BLD-MCNC: 12.7 G/DL (ref 11.7–15.7)
LYMPHOCYTES # BLD MANUAL: 1 10E3/UL (ref 0.8–5.3)
LYMPHOCYTES NFR BLD MANUAL: 30 %
MAGNESIUM SERPL-MCNC: 2.4 MG/DL (ref 1.6–2.3)
MCH RBC QN AUTO: 28.5 PG (ref 26.5–33)
MCHC RBC AUTO-ENTMCNC: 33.3 G/DL (ref 31.5–36.5)
MCV RBC AUTO: 86 FL (ref 78–100)
MONOCYTES # BLD MANUAL: 0.2 10E3/UL (ref 0–1.3)
MONOCYTES NFR BLD MANUAL: 7 %
NEUTROPHILS # BLD MANUAL: 1.9 10E3/UL (ref 1.6–8.3)
NEUTROPHILS NFR BLD MANUAL: 59 %
PHOSPHATE SERPL-MCNC: 4 MG/DL (ref 2.5–4.5)
PLAT MORPH BLD: NORMAL
PLATELET # BLD AUTO: 225 10E3/UL (ref 150–450)
POTASSIUM BLD-SCNC: 3.3 MMOL/L (ref 3.4–5.3)
PROT SERPL-MCNC: 6.9 G/DL (ref 6.8–8.8)
RBC # BLD AUTO: 4.45 10E6/UL (ref 3.8–5.2)
RBC MORPH BLD: NORMAL
SODIUM SERPL-SCNC: 135 MMOL/L (ref 133–144)
WBC # BLD AUTO: 3.3 10E3/UL (ref 4–11)

## 2021-10-26 PROCEDURE — 85027 COMPLETE CBC AUTOMATED: CPT | Performed by: NURSE PRACTITIONER

## 2021-10-26 PROCEDURE — 96372 THER/PROPH/DIAG INJ SC/IM: CPT | Mod: 59 | Performed by: NURSE PRACTITIONER

## 2021-10-26 PROCEDURE — 99207 PR NO CHARGE NURSE ONLY: CPT

## 2021-10-26 PROCEDURE — 96367 TX/PROPH/DG ADDL SEQ IV INF: CPT | Performed by: NURSE PRACTITIONER

## 2021-10-26 PROCEDURE — 84100 ASSAY OF PHOSPHORUS: CPT | Performed by: NURSE PRACTITIONER

## 2021-10-26 PROCEDURE — S0028 INJECTION, FAMOTIDINE, 20 MG: HCPCS | Performed by: NURSE PRACTITIONER

## 2021-10-26 PROCEDURE — 80053 COMPREHEN METABOLIC PANEL: CPT | Performed by: NURSE PRACTITIONER

## 2021-10-26 PROCEDURE — 96375 TX/PRO/DX INJ NEW DRUG ADDON: CPT | Performed by: NURSE PRACTITIONER

## 2021-10-26 PROCEDURE — 83735 ASSAY OF MAGNESIUM: CPT | Performed by: NURSE PRACTITIONER

## 2021-10-26 PROCEDURE — 96415 CHEMO IV INFUSION ADDL HR: CPT | Performed by: NURSE PRACTITIONER

## 2021-10-26 PROCEDURE — 99207 PR NO CHARGE LOS: CPT

## 2021-10-26 PROCEDURE — 96413 CHEMO IV INFUSION 1 HR: CPT | Performed by: NURSE PRACTITIONER

## 2021-10-26 RX ORDER — DIPHENHYDRAMINE HCL 25 MG
50 CAPSULE ORAL ONCE
Status: COMPLETED | OUTPATIENT
Start: 2021-10-26 | End: 2021-10-26

## 2021-10-26 RX ORDER — ACETAMINOPHEN 325 MG/1
650 TABLET ORAL ONCE
Status: COMPLETED | OUTPATIENT
Start: 2021-10-26 | End: 2021-10-26

## 2021-10-26 RX ORDER — HEPARIN SODIUM (PORCINE) LOCK FLUSH IV SOLN 100 UNIT/ML 100 UNIT/ML
5 SOLUTION INTRAVENOUS
Status: DISCONTINUED | OUTPATIENT
Start: 2021-10-26 | End: 2021-10-26 | Stop reason: HOSPADM

## 2021-10-26 RX ORDER — HEPARIN SODIUM (PORCINE) LOCK FLUSH IV SOLN 100 UNIT/ML 100 UNIT/ML
500 SOLUTION INTRAVENOUS ONCE
Status: COMPLETED | OUTPATIENT
Start: 2021-10-26 | End: 2021-10-26

## 2021-10-26 RX ADMIN — Medication 500 ML: at 13:02

## 2021-10-26 RX ADMIN — Medication 50 MG: at 13:01

## 2021-10-26 RX ADMIN — ACETAMINOPHEN 650 MG: 325 TABLET ORAL at 13:01

## 2021-10-26 RX ADMIN — HEPARIN SODIUM (PORCINE) LOCK FLUSH IV SOLN 100 UNIT/ML 500 UNITS: 100 SOLUTION at 12:07

## 2021-10-26 RX ADMIN — HEPARIN SODIUM (PORCINE) LOCK FLUSH IV SOLN 100 UNIT/ML 5 ML: 100 SOLUTION at 15:46

## 2021-10-26 ASSESSMENT — PAIN SCALES - GENERAL: PAINLEVEL: NO PAIN (0)

## 2021-10-26 NOTE — PROGRESS NOTES
Infusion Nursing Note:  Shaneka GARCÍA Alvarez presents today for Zheng Jones.    Patient seen by provider today: No   present during visit today: Not Applicable.    Note: Patient reports tolerating treatment well.      Intravenous Access:  Implanted Port.    Treatment Conditions:  Lab Results   Component Value Date    HGB 12.7 10/26/2021    WBC 3.3 (L) 10/26/2021    ANEU 1.9 10/26/2021    ANEUTAUTO 2.2 10/19/2021     10/26/2021      Lab Results   Component Value Date     10/26/2021    POTASSIUM 3.3 (L) 10/26/2021    MAG 2.4 (H) 10/26/2021    CR 0.80 10/26/2021    RAFAELA 9.0 10/26/2021    BILITOTAL 0.3 10/26/2021    ALBUMIN 3.7 10/26/2021    ALT 34 10/26/2021    AST 21 10/26/2021         Post Infusion Assessment:  Patient tolerated infusion without incident.  Patient tolerated injection without incident.  Patient observed for 30 minutes post Trodelvy per protocol.  Site patent and intact, free from redness, edema or discomfort.  No evidence of extravasations.  Access discontinued per protocol.       Discharge Plan:   [facility]  Patient will return 11/9 for next appointment.   Patient discharged in stable condition accompanied by: self.  Departure Mode: Ambulatory.      Cristina Loaiza RN

## 2021-10-26 NOTE — PROGRESS NOTES
Patient's port accessed using sterile procedure. Blood return noted. Lab tubes drawn, labeled and sent to lab. Port flushed with saline and heparin. Patient tolerated lab draw well.       Alycia Judge RN

## 2021-10-30 DIAGNOSIS — E87.6 HYPOKALEMIA: ICD-10-CM

## 2021-11-01 RX ORDER — POTASSIUM CHLORIDE 750 MG/1
TABLET, EXTENDED RELEASE ORAL
Qty: 14 TABLET | Refills: 0 | Status: SHIPPED | OUTPATIENT
Start: 2021-11-01 | End: 2022-04-14

## 2021-11-09 ENCOUNTER — VIRTUAL VISIT (OUTPATIENT)
Dept: ONCOLOGY | Facility: CLINIC | Age: 61
End: 2021-11-09
Payer: COMMERCIAL

## 2021-11-09 ENCOUNTER — PATIENT OUTREACH (OUTPATIENT)
Dept: ONCOLOGY | Facility: CLINIC | Age: 61
End: 2021-11-09

## 2021-11-09 DIAGNOSIS — C50.919 METASTATIC BREAST CANCER: Primary | ICD-10-CM

## 2021-11-09 DIAGNOSIS — C79.51 BONE METASTASES: ICD-10-CM

## 2021-11-09 DIAGNOSIS — R19.7 DIARRHEA, UNSPECIFIED TYPE: ICD-10-CM

## 2021-11-09 PROCEDURE — 99215 OFFICE O/P EST HI 40 MIN: CPT | Mod: GT | Performed by: INTERNAL MEDICINE

## 2021-11-09 NOTE — PROGRESS NOTES
Spoke with patient after she called and spoke to one of our schedulers to cancel her appointment with Dr. Zepeda and infusion.  She started having diarrhea this morning - she states about two episodes per hour.  She vomited one time last night; no further vomiting or nausea this morning.  Last night she was sweaty and she thought she may have had a low grade fever.  She has not taken any Imodium.  Immunotherapy is supposed to be today - advised this may need to canceled because of diarrhea.  She wanted to be put back on the schedule, so she was put back on with Dr. Zepeda for today.

## 2021-11-09 NOTE — PROGRESS NOTES
Shaneka is a 61 year old who is being evaluated via a billable video visit.      How would you like to obtain your AVS? [employer]  If the video visit is dropped, the invitation should be resent by: Text to cell phone: [phone]  Will anyone else be joining your video visit? No    Video Start Time: 10:56 AM  Video-Visit Details    Type of service:  Video Visit    Video End Time:11:05 AM    Originating Location (pt. Location): Home    Distant Location (provider location):  [facility]     Platform used for Video Visit: [employer]  ONCOLOGY FOLLOW UP NOTE: 11/09/21        I took the history from reviewing the previous notes that she was following with Dr. Amaya.  I have copied and updated from prior notes.    ONCOLOGY History:    1.  January 2006:  Diagnosed with Stage IIB, T2 N1 M0 invasive lobular carcinoma of the right breast.  Final pathology showed a 4.5 x 3.8 x 2.5 cm, 01/14 lymph nodes positive.  Estrogen, progesterone receptor positive, HER-2 negative.     2.  Genetics.  BRCA1 and 2 mutations not detected. 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       THERAPY TO DATE:   1.  2006:  ECOG 2104 protocol of dose-dense Adriamycin, Cytoxan and Avastin x4 cycles followed by Taxol and Avastin x4 cycles.   2.  03/2007:  Completed 1 year Avastin.   3.  11/2006-01/2007:  Radiotherapy to the right breast of 5040 cGy.   4.  03/20070112-7490:  Aromasin.  Stopped after moving to the [facility].   5.  01/2016:  Right modified radical mastectomy with latissimus dorsi flap reconstruction and a left prophylactic mastectomy with latissimus dorsi flap reconstruction.     She recently had MRI of the brain as a follow-up for multiple sclerosis and there were multiple calvarial lesions noted which was suspicious for metastatic disease.  There was no evidence of new multiple sclerosis lesions.  She had a PET scan on 8/30/2019 which showed widespread bone metastatic lesions (calvarium, spine, sacrum,  pelvis, ribs most prominent at C7, T3, L1 and L4), hypermetabolic 3 cm lesion in LLL, and mediastinal/hilar LN. CEA wnl at 1.9 and C27-29 elevated to 415 (28 on 8/23/16). A CT guided biopsy of the right iliac bone was obtained on 9/4/19, pathology consistent with metastatic adenocarcinoma (breast), ER/NV negative, HER-2 negative PDL 1 < 1%. A second biopsy was take of the LLL nodule via EBUS on 9/5/19 did not show any malignancy.    C/T/L spine MRI 8/3/19 to 9/2/19 with numerous enhancing lesions of the C, T and L spine, largest around T9 and L1. No evidence of cord compression. Has a L1 compression fracture and impending L4.     Received radiation T12-L5 3000 cGy in 10 fractions from 9/6/2019 till 9/18/2019.  Neurosurgery recommended no surgical intervention but to wear Converse brace when out of bed and HOB >30 degrees    She started palliative Xeloda 2000/1500 on 10/1/2019 but after just a few doses she noticed nausea, red eyes blurred vision, loss of appetite.  She stopped taking it after 5 doses and was seen by nurse practitioner on 10/7/2019 when she was improving.  At that point she was restarted on Xeloda at a lower dose of 1000 mg twice a day.  As she was not able to tolerate even the lower dose with extreme nausea and vomiting and feeling extremely fatigued and blurry vision, we decided on stopping Xeloda.    We decided on repeating scans and MRI brain on 10/25/2019 showed no intracranial metastatic disease.   Multiple enhancing calvarial lesions may be increased in number and are suggestive of metastatic disease. Thinner imaging on the current study may identify the smaller lesions and may be responsible for  identified more lesions.   There is a single focus of T2 hyperintense signal within the anterior right temporal lobe may represent interval demyelination. There is no evidence for active demyelination.    PET/CT on 11/1/2019 showed favorable response to therapy and Overall FDG uptake within scattered  osseous metastases is decreased since 8/30/2019, particularly within the pelvis. Increased sclerotic appearance of L1 and L4 pathologic compression deformities, likely sequela of recently completed palliative radiation therapy.    No significant FDG uptake in previously demonstrated hypermetabolic mediastinal lymph nodes. Biopsy negative on 9/5/2019.  There is also decreased FDG uptake in the left lower lobe (biopsy negative for  malignancy), now with dense consolidation containing air bronchograms suggestive of infection versus aspiration.  There is diffuse FDG uptake within the esophagus, consistent with esophagitis.    Because of intolerance to Xeloda we decided on switching to weekly paclitaxel on 11/5/2019.    C#2 Taxol 12/4/19- started with 70mg/m2 dose reduction    C#3 Taxol 12/30/2019     PET/CT on 1/24/2020 showed progression of the disease in some of the bone lesions including increase in size and lytic lesion within the vertebral body of C4 measuring 1 cm as opposed to 0.6 cm previously and a new central soft tissue nodule with SUV max 4.1 when previously it was non-hypermetabolic.  There is also some progression noted in the right proximal femur and right iliac bone.  There is decreased metabolic uptake in the right lamina of T9 with SUV max 4.7 when previously it was 8.0.  Other lesions are stable.    CA 27-29 also had increased significantly and it was 257 on 1/28/2020.    We decided on switching to eribulin on 1/28/2020.    C#2 2/18/2020  C#2 D#8 2/25/2020    C#3 D#1 3/18/2020 -delayed by 1 week as per patient's preference because she wanted to visit her family.    She had a repeat PET/CT on 3/27/2020 which showed stable size of the bone metastatic lesions although the FDG avidity was less, consistent with treatment response.    She had MRI of the thoracic spine on 4/3/2020 and it was compared to the one which was done in August 2019 and it showed increased size of several metastatic lesions most  notably at T9 but also at T5-T7.  Other lesions at T10, T11 and T12 appeared improved.    CA 27-29 was also down to 222 on 3/18/2020.    Cycle #4 eribulin ( dose reduced to 1.2 mg/m2 )-4/7/2020-   Cycle #5 Eribulin ( 1.2 mg/m2 )- 4/28/2020   Cycle #6 Eribulin ( 1.2 mg/m2 )- 5/19/2020     Cycle #7 Eribulin ( 1.2 mg/m2 )- 6/9/2020    Cycle #8 Eribulin ( 1.2 mg/m2 )- 6/30/2020     She had a repeat PET scan on 7/17/2020 which showed incidental finding of new acute bilateral pulmonary embolism in the distal left main pulmonary artery extending in the left upper and lower lobes and in the segmental and branch arteries in the right lower lobe.    It also showed mildly increased FDG avidity in the lytic lesion in the T9 lamina and right pedicle with SUV max 5.3, previously 3.9.  That is also slightly increased FDG uptake to the left anterior fifth rib at the costochondral junction SUV max 3.9, previously 2.5.  There is slightly decreased FDG uptake in the right femoral neck lesion SUV max 2.2, previously 2.9.  FDG uptake in other bone lesions is fairly stable.  No new metastasis are seen.    CA 27-29 was 308 on 6/30/2020.  It was 286 on 5/19/2020.    Overall this is consistent with only slight progression versus stable disease.    She was started on Lovenox.    Cycle #9 eribulin 7/21/2020. CA 27-29 was 238    C#10 eribulin 8/18/2020. ( delayed by one week for ANC 0.9 )    C#11 Eribulin 9/8/2020    C#12 Eribulin 9/29/2020     C#13 Eribulin 10/20/2020     PET scan on 11/6/2020 shows progression of the disease with increased FDG uptake in the lytic lesions in the T9/T10 and right posterolateral elements as well as increased FDG uptake in the previously known hypermetabolic right iliac bone lesion suggesting recurrence of previously treated metastasis.  There is new subtle lesion in the right manubrium.  There is also a new fracture in the anterolateral left fourth rib with associated uptake and this could be a pathologic  fracture.  Healing fracture in the left anterior fifth rib lesion.  There is resolution of the left pulmonary emboli.  Decreased FDG uptake of the esophagus consistent with improving inflammation.    CA 27-29 on 10/20/2020 was also elevated at 489.    C#1 Trodelvy on 11/11/2020.  Cycle #2 Trodelvy 12/2/2020  Cycle number 2-day #8 Trodelvy 12/8/2020.    12/15/2020-12/16/2020.  She was admitted to the hospital with nausea vomiting and dehydration.  She had tachycardia and initial lactic acid of 5.  It decreased to 1.4 after 2 L of normal saline.  She also had hypokalemia and ANC was 1.3.  She was treated with IV fluids.  Procalcitonin was negative.  CTA of the chest was negative for pulmonary embolism or pneumonia.  No bacterial infection was documented.  Her medication which she was initially unable to tolerate at home, were restarted and she was discharged home once started to feel better.      We delayed the start of cycle number 3 x 1 week and decrease the dose of chemotherapy by 25%.  She also had neutropenia with ANC of 1.0.      She started cycle number 3-day #1 on 12/29/2020.  CA 27-29 was 392.    Cycle number 3-day #8 1/5/2021.    C#4 Trodelvy 1/20/2021- 75% dose    2/5/2021.  PET/CT overall shows a good response to treatment with improvement of previously hypermetabolic lesions in the spine and pelvis and stability of other bone meta stasis.  There is a single lytic lesion in the right iliac bone which demonstrates slight Yimi increased FDG uptake and has SUV max 4.7 while previously it was SUV max 3.4.  There was diffuse wall thickening of the esophagus with uptake in the esophagus in the fundus of the stomach and this could be seen with inflammation.    Trodelvy cycle #5 - 2/10/2021.  75% of the dose.  CA 27-29 was 337.    Trodelvy cycle #6 - 3/3/2021.  75% of the dose.  Trodelvy cycle #6, D#8 - 3/10/2021.  75% of the dose.    Trodelvy C#8, D#8 on 4/21/2021  CA 27-29 stable at 371 on 4/14/2021    Trodelvy,  cycle #9- 5/5/2021        On 2/25/2020 when she had biopsy of the right acetabulum and it was consistent with metastatic lobular carcinoma.  Again it was Triple Negative.     On 3/18/2020 when she also met with Dr. Arelis Arshad who also advised testing for androgen receptor studies on the biopsy specimen.  Tumor was diffusely androgen receptor positive.      5/26/2021-cycle #10 Trodelvy started    CA 27-29 was 545.    6/11/2021.  PET/CT shows mildly increased uptake in several bone lesions for example in the left anterior iliac crest, mid right iliac crest and left ischium.  Uptake in other bone lesions are similar to previously.    Because of minimal progression of the disease and she is tolerating chemotherapy very well, we decided on continuing the same chemotherapy.    6/16/2021-Trodelvy cycle #11    7/7/2021 -Trodelvy cycle #12    9/14/2021-Trodelvy-cycle #15, day #8.    9/8/2021-CA 27-29 was more elevated and it was 1176.    PET/CT on 9/24/2021 showed stable findings with no evidence of FDG avid metastatic disease within the body.  Left axillary lymph nodes are prominent and hypermetabolic and likely from recent vaccinations in the left deltoid muscle.  Healed bony metastasis seems stable.      Cycle #16, Trodelvy 9/28/2021.  Cycle #17, day #8 Trodelvy was on 10/26/2021.      Interval history.  This is a video visit.  I also reviewed notes from Dr. Mejia whom she saw on 10/6/2021.  She was not considered eligible for Enfortumab trial.  She tells me that she has been tolerating the chemotherapy well without any nausea vomiting diarrhea or constipation.  She has had some fatigue and mild baseline neuropathy.  Pain has been is stable.    Since last evening she has not been feeling well.  Last night she vomited once but currently denies any nausea or vomiting.  This morning she has had 4 episodes of diarrhea.  No bleeding.  No abdominal pain.  She has been drinking fluids but has not eaten anything since last  night.  She does not feel that she is dehydrated.  She canceled the chemotherapy which was scheduled for today.  She has not tried anything for the diarrhea.    ECOG 1    ROS:  Rest of the comprehensive review of the system was negative.        I reviewed other history in epic as below.       PAST MEDICAL HISTORY:     1.  Breast cancer  2.  Multiple sclerosis.   3.  Depression.   4.  Migraines.   5.  Hypertension.   6.  Cholecystectomy and umbilical hernia repair.     SOCIAL HISTORY: She smoked for 5 years but quit many years ago.  Drinks alcohol socially.  She lives with her .  She is a teacher in middle school.       FAMILY HISTORY: She mentions that her mother had breast cancer at 49.  Both grandmothers had breast cancer but she is not sure of the age.  A couple of cousins have cancers.  Patient has 3 children who are healthy.    Current Outpatient Medications   Medication     acetaminophen (TYLENOL) 500 MG tablet     albuterol (PROAIR HFA/PROVENTIL HFA/VENTOLIN HFA) 108 (90 Base) MCG/ACT inhaler     busPIRone (BUSPAR) 15 MG tablet     calcium citrate-vitamin D (CITRACAL) 315-250 MG-UNIT TABS     Cholecalciferol (VITAMIN D3 PO)     ELIQUIS ANTICOAGULANT 5 MG tablet     HEMP OIL OR EXTRACT OR OTHER CBD CANNABINOID, NOT MEDICAL CANNABIS,     ibuprofen (ADVIL/MOTRIN) 600 MG tablet     loratadine (CLARITIN) 10 MG tablet     LORazepam (ATIVAN) 1 MG tablet     magnesium 250 MG tablet     morphine (MS CONTIN) 15 MG CR tablet     morphine (MSIR) 15 MG IR tablet     Nerve Stimulator (NERIVIO) CRISTOPHER     OLANZapine (ZYPREXA) 5 MG tablet     potassium chloride ER (KLOR-CON M) 10 MEQ CR tablet     promethazine (PHENERGAN) 25 MG tablet     propranolol ER (INDERAL LA) 80 MG 24 hr capsule     senna-docusate (SENOKOT-S/PERICOLACE) 8.6-50 MG tablet     traZODone (DESYREL) 50 MG tablet     venlafaxine (EFFEXOR-XR) 75 MG 24 hr capsule     No current facility-administered medications for this visit.        Allergies   Allergen  Reactions     Bactrim [Sulfamethoxazole W/Trimethoprim]      Internal bleeding     Copaxone [Glatiramer] Hives          PHYSICAL EXAMINATION:     There were no vitals taken for this visit.  Wt Readings from Last 4 Encounters:   10/26/21 62.1 kg (137 lb)   10/19/21 63.4 kg (139 lb 11.2 oz)   10/06/21 66.7 kg (147 lb 1.6 oz)   10/05/21 66.3 kg (146 lb 1.6 oz)         Constitutional.  Looks well and in no apparent distress.   Eyes.  Without eye redness or apparent jaundice.   Respiratory.  Non labored breathing. Speaking in full sentences.    Skin.  No concerning skin rashes on the skin visualized.   Neurological.  Is alert and oriented.  Psychiatric.  Mood and affect seem appropriate.      The rest of a comprehensive physical examination is deferred due to Public Health Emergency video visit restrictions.    Labs and Imaging.    Reviewed.    10/26/2021.    CMP shows potassium of 3.3.  Magnesium 2.4.  Rest was unremarkable.  CBC showed WBC 3.3.  Hemoglobin and platelets are normal.  ANC 1.9.    CA 27-29 was 1176 on 9/8/2021.  It was 1023 on 10/19/2021.    9/28/2021.      Iron studies, ferritin is 101, iron 51, TIBC 268 and iron saturation index 19%.          CA 27-29 was 465 on 6/16/2021.  It was 545 on 5/26/2021      PET/CT on 9/24/2021 showed stable findings with no evidence of FDG avid metastatic disease within the body.  Left axillary lymph nodes are prominent and hypermetabolic and likely from recent vaccinations in the left deltoid muscle.  Healed bony metastasis seems stable.      Biopsy from the right acetabulum shows metastatic lobular carcinoma.  It is triple negative.  Androgen receptor was diffusely positive (90%, moderate intensity) by immunohistochemistry. )  PD-L1 negative.    Foundation one showed CDH1 mutation (initially lobular cancer), CCND1 amplification ( equivocal ). FGF3, FGF4, FGF19 amplification ( Equivocal ). Most promising is CCND1 amplification with abemaciclib showing response in 4/10  patients. FGF3,FGF4 and FGF19 are targetable with pan-FGFR inhibitors.           ASSESSMENT AND PLAN:     Now she has metastatic breast cancer negative breast cancer (androgen receptor positive ) with extensive involvement of the bones.  There is also a left lower lobe hypermetabolic lesion and mediastinal lymph nodes which are hypermetabolic.  The biopsy from the right iliac bone is consistent with metastatic lobular breast cancer but it is hormone receptor and HER-2 negative and PDL 1 is also negative.    Foundation 1 testing did not reveal any actionable mutations.    She was intolerant to Xeloda and progressed on Taxol and eribulin.      We then switched to recently FDA approved sacituzumab govitecan-hziy (Trodelvy) for TNBC, based on the results of the phase II IMMU-132-01 clinical trial.   She started this on 11/11/2020.      We delayed the start of cycle number 3 x 1 week and decrease the dose of chemotherapy by 25% and she also has neutropenia with ANC of 1.  She started cycle number 3-day #1 on 12/29/2020.      After 4 cycles of chemotherapy, overall the PET scan shows positive response to treatment apart from mildly increased FDG avidity in one of the right iliac bone lesions.  CA 27-29 was 454 slightly elevated from before.    We decided on continuing the same chemotherapy.      She obtained a second opinion from Dr. Castillo from [facility] who recommended a repeat biopsy to be done to make sure that she really has triple negative breast cancer.      She also met with Dr. Arelis Arshad on 3/18/2021.      After 10 cycles of Trodelvy, she had PET/CT on 6/11/2021 which showed mildly increased uptake in some of the bone lesions while a stability in other bone lesions.      We discussed the situation in detail.      Because of minimal progression of the disease plus the fact that she had been tolerating treatments well, we decided on continuing the same chemotherapy.     After 15 cycles, repeat PET  scan showed stable findings.  CA 27-29 has been increasing and it is 1176.      As she was tolerating the chemotherapy well and her quality of life has been decent and scans were stable although she had a rising CA 27-29, we decided on continuing the same chemotherapy because it has been a very slow progression of the disease.    Foundation one showed CDH1 mutation (initially lobular cancer), CCND1 amplification ( equivocal ). FGF3, FGF4, FGF19 amplification ( Equivocal ). Most promising is CCND1 amplification with abemaciclib showing response in 4/10 patients. FGF3,FGF4 and FGF19 are targetable with pan-FGFR inhibitor    As the tumor is diffusely positive for androgen receptor, we will consider giving her androgen receptor blockers like Casodex 150 mg daily.    We can consider Doxil every 4 weeks.     She met with Dr. Mejia to consider clinical trial but at this time she is not eligible for any.      Overall she is tolerating the chemotherapy well.  We will hold off chemotherapy today because of diarrhea which started this morning.  We will delay chemotherapy by 1 week.    Diarrhea.  He was doing well but had diarrhea this morning.  She had 4 episodes up until now.  She vomited last night but now does not feel nauseous.  I advised her to take Imodium and keep herself very well-hydrated.  If she develops any fevers or abdominal pain or unable to tolerate p.o. intake or if she thinks she is becoming dehydrated then she should come to the hospital right away.  She understands that.    Bone disease.  She has metastatic disease to the bone.  Previously she got palliative radiation to T12-L5 3000 cGy in 10 fractions from 9/6/2019 till 9/18/2019.  She was evaluated by orthopedics Dr. Bipin Elliott on 11/1/2019 and conservative management was recommended.    She was on Zometa every 3 months. She last received on 9/29/2020.  Because of progression of the disease in the bones, we switched to Xgeva which she received  on 11/11/2020.  PET scan is stable.  Bone lesions show treated meta stasis.  Continue Xgeva every month.  She last received on 10/26/2021.  Continue calcium and vitamin D      Pain management.  Continue MS Contin and morphine sulfate twice daily.  Follow with palliative care.     Bilateral pulmonary emboli.  These were incidentally found.  She is asymptomatic.  Continue Eliquis.        Neuropathy.  Mild stable neuropathy in her hands.  This is in addition to the chronic balance issues and heat and cold sensitivity from underlying multiple sclerosis which is also stable for years.  Continue to observe.     We did not address the following today.  Subcutaneous nodule in the scalp.  This looks like an epidermoid cyst.  She thinks it is a stable.  Continue to monitor.       Insomnia.  Continue trazodone.      Wall thickening of esophagus and FDG uptake of fundus of the stomach.  It could be in the setting of inflammation from recent nausea and vomiting.  Symptoms have resolved.  Continue to monitor clinically.          Vision changes.    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  It was thought that this could be due to Taxol as patient reported to the ophthalmologist that she was on Taxol in September 2019 when her symptoms started.  But she was not on Taxol in September 2019 when she started noticing the visual changes so Taxol is not responsible for this.  The first dose of Taxol was on 11/5/2019.   She had left cataract extraction on 2/8/2021 and she has noticed significant improvement in her vision.  She plans to do cataract extraction of the right eye in couple of weeks.          Increased FDG ability in the colon.  She had FDG uptake in the cecum and ascending colon but no corresponding lesion was seen on the CT scan.  She is completely asymptomatic so at this time we will continue to observe.    Discussion regarding healthcare directives.  On previous visit she told me that  she will bring the health care directive for our records but she forgot so I told her to bring it on next visit.      Breast implant removal.  She tells me that she was planning to do breast implant removal because there was a recall on this.  Her surgery was scheduled for 9/24/2019.  Because of this new development of metastatic breast cancer and her recent radiation, surgery has been canceled.  We discussed that at this time treatment for metastatic breast cancer would take precedence over the other surgery as the other surgery would require her to be off chemotherapy for several weeks and in that case the chances of cancer progression would be high and I would not recommend that.    Multiple sclerosis.  She will continue to follow with her neurologist Dr. Quiles.  Currently multiple sclerosis is under control and currently she is not taking any medications.      Return to clinic 4 weeks      All of her questions were answered to her satisfaction.  She is agreeable and comfortable with the plan.    Dewayne Zepeda MD

## 2021-11-09 NOTE — LETTER
11/9/2021         RE: Shaneka Alvarez  [id number] [facility] [id number]        Dear Colleague,    Thank you for referring your patient, Shaneka Alvarez, to the [facility]. Please see a copy of my visit note below.    Shaneka is a 61 year old who is being evaluated via a billable video visit.      How would you like to obtain your AVS? MyChart  If the video visit is dropped, the invitation should be resent by: Text to cell phone: [phone]  Will anyone else be joining your video visit? No    Video Start Time: 10:56 AM  Video-Visit Details    Type of service:  Video Visit    Video End Time:11:05 AM    Originating Location (pt. Location): Home    Distant Location (provider location):  [facility]     Platform used for Video Visit: [employer]  ONCOLOGY FOLLOW UP NOTE: 11/09/21        I took the history from reviewing the previous notes that she was following with Dr. Amaya.  I have copied and updated from prior notes.    ONCOLOGY History:    1. January 2006:  Diagnosed with Stage IIB, T2 N1 M0 invasive lobular carcinoma of the right breast.  Final pathology showed a 4.5 x 3.8 x 2.5 cm, 01/14 lymph nodes positive.  Estrogen, progesterone receptor positive, HER-2 negative.     2.  Genetics.  BRCA1 and 2 mutations not detected. 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       THERAPY TO DATE:   1. 2006:  ECOG 2104 protocol of dose-dense Adriamycin, Cytoxan and Avastin x4 cycles followed by Taxol and Avastin x4 cycles.   2.  03/2007:  Completed 1 year Avastin.   3.  11/2006-01/2007:  Radiotherapy to the right breast of 5040 cGy.   4.  03/[id number]:  Aromasin.  Stopped after moving to the [facility].   5.  01/2016:  Right modified radical mastectomy with latissimus dorsi flap reconstruction and a left prophylactic mastectomy with latissimus dorsi flap reconstruction.     She recently had MRI of the brain as a follow-up for multiple sclerosis  and there were multiple calvarial lesions noted which was suspicious for metastatic disease.  There was no evidence of new multiple sclerosis lesions.  She had a PET scan on 8/30/2019 which showed widespread bone metastatic lesions (calvarium, spine, sacrum, pelvis, ribs most prominent at C7, T3, L1 and L4), hypermetabolic 3 cm lesion in LLL, and mediastinal/hilar LN. CEA wnl at 1.9 and C27-29 elevated to 415 (28 on 8/23/16). A CT guided biopsy of the right iliac bone was obtained on 9/4/19, pathology consistent with metastatic adenocarcinoma (breast), ER/ME negative, HER-2 negative PDL 1 < 1%. A second biopsy was take of the LLL nodule via EBUS on 9/5/19 did not show any malignancy.    C/T/L spine MRI 8/3/19 to 9/2/19 with numerous enhancing lesions of the C, T and L spine, largest around T9 and L1. No evidence of cord compression. Has a L1 compression fracture and impending L4.     Received radiation T12-L5 3000 cGy in 10 fractions from 9/6/2019 till 9/18/2019.  Neurosurgery recommended no surgical intervention but to wear [place] brace when out of bed and HOB >30 degrees    She started palliative Xeloda 2000/1500 on 10/1/2019 but after just a few doses she noticed nausea, red eyes blurred vision, loss of appetite.  She stopped taking it after 5 doses and was seen by nurse practitioner on 10/7/2019 when she was improving.  At that point she was restarted on Xeloda at a lower dose of 1000 mg twice a day.  As she was not able to tolerate even the lower dose with extreme nausea and vomiting and feeling extremely fatigued and blurry vision, we decided on stopping Xeloda.    We decided on repeating scans and MRI brain on 10/25/2019 showed no intracranial metastatic disease.   Multiple enhancing calvarial lesions may be increased in number and are suggestive of metastatic disease. Thinner imaging on the current study may identify the smaller lesions and may be responsible for  identified more lesions.   There is a single  focus of T2 hyperintense signal within the anterior right temporal lobe may represent interval demyelination. There is no evidence for active demyelination.    PET/CT on 11/1/2019 showed favorable response to therapy and Overall FDG uptake within scattered osseous metastases is decreased since 8/30/2019, particularly within the pelvis. Increased sclerotic appearance of L1 and L4 pathologic compression deformities, likely sequela of recently completed palliative radiation therapy.    No significant FDG uptake in previously demonstrated hypermetabolic mediastinal lymph nodes. Biopsy negative on 9/5/2019.  There is also decreased FDG uptake in the left lower lobe (biopsy negative for  malignancy), now with dense consolidation containing air bronchograms suggestive of infection versus aspiration.  There is diffuse FDG uptake within the esophagus, consistent with esophagitis.    Because of intolerance to Xeloda we decided on switching to weekly paclitaxel on 11/5/2019.    C#2 Taxol 12/4/19- started with 70mg/m2 dose reduction    C#3 Taxol 12/30/2019     PET/CT on 1/24/2020 showed progression of the disease in some of the bone lesions including increase in size and lytic lesion within the vertebral body of C4 measuring 1 cm as opposed to 0.6 cm previously and a new central soft tissue nodule with SUV max 4.1 when previously it was non-hypermetabolic.  There is also some progression noted in the right proximal femur and right iliac bone.  There is decreased metabolic uptake in the right lamina of T9 with SUV max 4.7 when previously it was 8.0.  Other lesions are stable.    CA 27-29 also had increased significantly and it was 257 on 1/28/2020.    We decided on switching to eribulin on 1/28/2020.    C#2 2/18/2020  C#2 D#8 2/25/2020    C#3 D#1 3/18/2020 -delayed by 1 week as per patient's preference because she wanted to visit her family.    She had a repeat PET/CT on 3/27/2020 which showed stable size of the bone metastatic  lesions although the FDG avidity was less, consistent with treatment response.    She had MRI of the thoracic spine on 4/3/2020 and it was compared to the one which was done in August 2019 and it showed increased size of several metastatic lesions most notably at T9 but also at T5-T7.  Other lesions at T10, T11 and T12 appeared improved.    CA 27-29 was also down to 222 on 3/18/2020.    Cycle #4 eribulin ( dose reduced to 1.2 mg/m2 )-4/7/2020-   Cycle #5 Eribulin ( 1.2 mg/m2 )- 4/28/2020   Cycle #6 Eribulin ( 1.2 mg/m2 )- 5/19/2020     Cycle #7 Eribulin ( 1.2 mg/m2 )- 6/9/2020    Cycle #8 Eribulin ( 1.2 mg/m2 )- 6/30/2020     She had a repeat PET scan on 7/17/2020 which showed incidental finding of new acute bilateral pulmonary embolism in the distal left main pulmonary artery extending in the left upper and lower lobes and in the segmental and branch arteries in the right lower lobe.    It also showed mildly increased FDG avidity in the lytic lesion in the T9 lamina and right pedicle with SUV max 5.3, previously 3.9.  That is also slightly increased FDG uptake to the left anterior fifth rib at the costochondral junction SUV max 3.9, previously 2.5.  There is slightly decreased FDG uptake in the right femoral neck lesion SUV max 2.2, previously 2.9.  FDG uptake in other bone lesions is fairly stable.  No new metastasis are seen.    CA 27-29 was 308 on 6/30/2020.  It was 286 on 5/19/2020.    Overall this is consistent with only slight progression versus stable disease.    She was started on Lovenox.    Cycle #9 eribulin 7/21/2020. CA 27-29 was 238    C#10 eribulin 8/18/2020. ( delayed by one week for ANC 0.9 )    C#11 Eribulin 9/8/2020    C#12 Eribulin 9/29/2020     C#13 Eribulin 10/20/2020     PET scan on 11/6/2020 shows progression of the disease with increased FDG uptake in the lytic lesions in the T9/T10 and right posterolateral elements as well as increased FDG uptake in the previously known hypermetabolic right  iliac bone lesion suggesting recurrence of previously treated metastasis.  There is new subtle lesion in the right manubrium.  There is also a new fracture in the anterolateral left fourth rib with associated uptake and this could be a pathologic fracture.  Healing fracture in the left anterior fifth rib lesion.  There is resolution of the left pulmonary emboli.  Decreased FDG uptake of the esophagus consistent with improving inflammation.    CA 27-29 on 10/20/2020 was also elevated at 489.    C#1 Trodelvy on 11/11/2020.  Cycle #2 Trodelvy 12/2/2020  Cycle number 2-day #8 Trodelvy 12/8/2020.    12/15/2020-12/16/2020.  She was admitted to the hospital with nausea vomiting and dehydration.  She had tachycardia and initial lactic acid of 5.  It decreased to 1.4 after 2 L of normal saline.  She also had hypokalemia and ANC was 1.3.  She was treated with IV fluids.  Procalcitonin was negative.  CTA of the chest was negative for pulmonary embolism or pneumonia.  No bacterial infection was documented.  Her medication which she was initially unable to tolerate at home, were restarted and she was discharged home once started to feel better.      We delayed the start of cycle number 3 x 1 week and decrease the dose of chemotherapy by 25%.  She also had neutropenia with ANC of 1.0.      She started cycle number 3-day #1 on 12/29/2020.  CA 27-29 was 392.    Cycle number 3-day #8 1/5/2021.    C#4 Trodelvy 1/20/2021- 75% dose    2/5/2021.  PET/CT overall shows a good response to treatment with improvement of previously hypermetabolic lesions in the spine and pelvis and stability of other bone meta stasis.  There is a single lytic lesion in the right iliac bone which demonstrates slight Yimi increased FDG uptake and has SUV max 4.7 while previously it was SUV max 3.4.  There was diffuse wall thickening of the esophagus with uptake in the esophagus in the fundus of the stomach and this could be seen with inflammation.    Trodelvy  cycle #5 - 2/10/2021.  75% of the dose.  CA 27-29 was 337.    Trodelvy cycle #6 - 3/3/2021.  75% of the dose.  Trodelvy cycle #6, D#8 - 3/10/2021.  75% of the dose.    Trodelvy C#8, D#8 on 4/21/2021  CA 27-29 stable at 371 on 4/14/2021    Trodelvy, cycle #9- 5/5/2021        On 2/25/2020 when she had biopsy of the right acetabulum and it was consistent with metastatic lobular carcinoma.  Again it was Triple Negative.     On 3/18/2020 when she also met with Dr. Arelis Arshad who also advised testing for androgen receptor studies on the biopsy specimen.  Tumor was diffusely androgen receptor positive.      5/26/2021-cycle #10 Trodelvy started    CA 27-29 was 545.    6/11/2021.  PET/CT shows mildly increased uptake in several bone lesions for example in the left anterior iliac crest, mid right iliac crest and left ischium.  Uptake in other bone lesions are similar to previously.    Because of minimal progression of the disease and she is tolerating chemotherapy very well, we decided on continuing the same chemotherapy.    6/16/2021-Trodelvy cycle #11    7/7/2021 -Trodelvy cycle #12    9/14/2021-Trodelvy-cycle #15, day #8.    9/8/2021-CA 27-29 was more elevated and it was 1176.    PET/CT on 9/24/2021 showed stable findings with no evidence of FDG avid metastatic disease within the body.  Left axillary lymph nodes are prominent and hypermetabolic and likely from recent vaccinations in the left deltoid muscle.  Healed bony metastasis seems stable.      Cycle #16, Trodelvy 9/28/2021.  Cycle #17, day #8 Trodelvy was on 10/26/2021.      Interval history.  This is a video visit.  I also reviewed notes from Dr. Mejia whom she saw on 10/6/2021.  She was not considered eligible for Enfortumab trial.  She tells me that she has been tolerating the chemotherapy well without any nausea vomiting diarrhea or constipation.  She has had some fatigue and mild baseline neuropathy.  Pain has been is stable.    Since last evening she has not  been feeling well.  Last night she vomited once but currently denies any nausea or vomiting.  This morning she has had 4 episodes of diarrhea.  No bleeding.  No abdominal pain.  She has been drinking fluids but has not eaten anything since last night.  She does not feel that she is dehydrated.  She canceled the chemotherapy which was scheduled for today.  She has not tried anything for the diarrhea.    ECOG 1    ROS:  Rest of the comprehensive review of the system was negative.        I reviewed other history in epic as below.       PAST MEDICAL HISTORY:     1.  Breast cancer  2.  Multiple sclerosis.   3.  Depression.   4.  Migraines.   5.  Hypertension.   6.  Cholecystectomy and umbilical hernia repair.     SOCIAL HISTORY: She smoked for 5 years but quit many years ago.  Drinks alcohol socially.  She lives with her .  She is a teacher in middle school.       FAMILY HISTORY: She mentions that her mother had breast cancer at 49.  Both grandmothers had breast cancer but she is not sure of the age.  A couple of cousins have cancers.  Patient has 3 children who are healthy.    Current Outpatient Medications   Medication     acetaminophen (TYLENOL) 500 MG tablet     albuterol (PROAIR HFA/PROVENTIL HFA/VENTOLIN HFA) 108 (90 Base) MCG/ACT inhaler     busPIRone (BUSPAR) 15 MG tablet     calcium citrate-vitamin D (CITRACAL) 315-250 MG-UNIT TABS     Cholecalciferol (VITAMIN D3 PO)     ELIQUIS ANTICOAGULANT 5 MG tablet     HEMP OIL OR EXTRACT OR OTHER CBD CANNABINOID, NOT MEDICAL CANNABIS,     ibuprofen (ADVIL/MOTRIN) 600 MG tablet     loratadine (CLARITIN) 10 MG tablet     LORazepam (ATIVAN) 1 MG tablet     magnesium 250 MG tablet     morphine (MS CONTIN) 15 MG CR tablet     morphine (MSIR) 15 MG IR tablet     Nerve Stimulator (NERIVIO) CRISTOPHER     OLANZapine (ZYPREXA) 5 MG tablet     potassium chloride ER (KLOR-CON M) 10 MEQ CR tablet     promethazine (PHENERGAN) 25 MG tablet     propranolol ER (INDERAL LA) 80 MG 24 hr  capsule     senna-docusate (SENOKOT-S/PERICOLACE) 8.6-50 MG tablet     traZODone (DESYREL) 50 MG tablet     venlafaxine (EFFEXOR-XR) 75 MG 24 hr capsule     No current facility-administered medications for this visit.        Allergies   Allergen Reactions     Bactrim [Sulfamethoxazole W/Trimethoprim]      Internal bleeding     Copaxone [Glatiramer] Hives          PHYSICAL EXAMINATION:     There were no vitals taken for this visit.  Wt Readings from Last 4 Encounters:   10/26/21 62.1 kg (137 lb)   10/19/21 63.4 kg (139 lb 11.2 oz)   10/06/21 66.7 kg (147 lb 1.6 oz)   10/05/21 66.3 kg (146 lb 1.6 oz)         Constitutional.  Looks well and in no apparent distress.   Eyes.  Without eye redness or apparent jaundice.   Respiratory.  Non labored breathing. Speaking in full sentences.    Skin.  No concerning skin rashes on the skin visualized.   Neurological.  Is alert and oriented.  Psychiatric.  Mood and affect seem appropriate.      The rest of a comprehensive physical examination is deferred due to Public Health Emergency video visit restrictions.    Labs and Imaging.    Reviewed.    10/26/2021.    CMP shows potassium of 3.3.  Magnesium 2.4.  Rest was unremarkable.  CBC showed WBC 3.3.  Hemoglobin and platelets are normal.  ANC 1.9.    CA 27-29 was 1176 on 9/8/2021.  It was 1023 on 10/19/2021.    9/28/2021.      Iron studies, ferritin is 101, iron 51, TIBC 268 and iron saturation index 19%.          CA 27-29 was 465 on 6/16/2021.  It was 545 on 5/26/2021      PET/CT on 9/24/2021 showed stable findings with no evidence of FDG avid metastatic disease within the body.  Left axillary lymph nodes are prominent and hypermetabolic and likely from recent vaccinations in the left deltoid muscle.  Healed bony metastasis seems stable.      Biopsy from the right acetabulum shows metastatic lobular carcinoma.  It is triple negative.  Androgen receptor was diffusely positive (90%, moderate intensity) by immunohistochemistry.  )  PD-L1 negative.    Foundation one showed CDH1 mutation (initially lobular cancer), CCND1 amplification ( equivocal ). FGF3, FGF4, FGF19 amplification ( Equivocal ). Most promising is CCND1 amplification with abemaciclib showing response in 4/10 patients. FGF3,FGF4 and FGF19 are targetable with pan-FGFR inhibitors.           ASSESSMENT AND PLAN:     Now she has metastatic breast cancer negative breast cancer (androgen receptor positive ) with extensive involvement of the bones.  There is also a left lower lobe hypermetabolic lesion and mediastinal lymph nodes which are hypermetabolic.  The biopsy from the right iliac bone is consistent with metastatic lobular breast cancer but it is hormone receptor and HER-2 negative and PDL 1 is also negative.    Foundation 1 testing did not reveal any actionable mutations.    She was intolerant to Xeloda and progressed on Taxol and eribulin.      We then switched to recently FDA approved sacituzumab govitecan-hziy (Trodelvy) for TNBC, based on the results of the phase II IMMU-132-01 clinical trial.   She started this on 11/11/2020.      We delayed the start of cycle number 3 x 1 week and decrease the dose of chemotherapy by 25% and she also has neutropenia with ANC of 1.  She started cycle number 3-day #1 on 12/29/2020.      After 4 cycles of chemotherapy, overall the PET scan shows positive response to treatment apart from mildly increased FDG avidity in one of the right iliac bone lesions.  CA 27-29 was 454 slightly elevated from before.    We decided on continuing the same chemotherapy.      She obtained a second opinion from Dr. Castillo from [facility] who recommended a repeat biopsy to be done to make sure that she really has triple negative breast cancer.      She also met with Dr. Arelis Arshad on 3/18/2021.      After 10 cycles of Trodelvy, she had PET/CT on 6/11/2021 which showed mildly increased uptake in some of the bone lesions while a stability in other  bone lesions.      We discussed the situation in detail.      Because of minimal progression of the disease plus the fact that she had been tolerating treatments well, we decided on continuing the same chemotherapy.     After 15 cycles, repeat PET scan showed stable findings.  CA 27-29 has been increasing and it is 1176.      As she was tolerating the chemotherapy well and her quality of life has been decent and scans were stable although she had a rising CA 27-29, we decided on continuing the same chemotherapy because it has been a very slow progression of the disease.    Foundation one showed CDH1 mutation (initially lobular cancer), CCND1 amplification ( equivocal ). FGF3, FGF4, FGF19 amplification ( Equivocal ). Most promising is CCND1 amplification with abemaciclib showing response in 4/10 patients. FGF3,FGF4 and FGF19 are targetable with pan-FGFR inhibitor    As the tumor is diffusely positive for androgen receptor, we will consider giving her androgen receptor blockers like Casodex 150 mg daily.    We can consider Doxil every 4 weeks.     She met with Dr. Mejia to consider clinical trial but at this time she is not eligible for any.      Overall she is tolerating the chemotherapy well.  We will hold off chemotherapy today because of diarrhea which started this morning.  We will delay chemotherapy by 1 week.    Diarrhea.  He was doing well but had diarrhea this morning.  She had 4 episodes up until now.  She vomited last night but now does not feel nauseous.  I advised her to take Imodium and keep herself very well-hydrated.  If she develops any fevers or abdominal pain or unable to tolerate p.o. intake or if she thinks she is becoming dehydrated then she should come to the hospital right away.  She understands that.    Bone disease.  She has metastatic disease to the bone.  Previously she got palliative radiation to T12-L5 3000 cGy in 10 fractions from 9/6/2019 till 9/18/2019.  She was evaluated by  orthopedics Dr. Bipin Elliott on 11/1/2019 and conservative management was recommended.    She was on Zometa every 3 months. She last received on 9/29/2020.  Because of progression of the disease in the bones, we switched to Xgeva which she received on 11/11/2020.  PET scan is stable.  Bone lesions show treated meta stasis.  Continue Xgeva every month.  She last received on 10/26/2021.  Continue calcium and vitamin D      Pain management.  Continue MS Contin and morphine sulfate twice daily.  Follow with palliative care.     Bilateral pulmonary emboli.  These were incidentally found.  She is asymptomatic.  Continue Eliquis.        Neuropathy.  Mild stable neuropathy in her hands.  This is in addition to the chronic balance issues and heat and cold sensitivity from underlying multiple sclerosis which is also stable for years.  Continue to observe.     We did not address the following today.  Subcutaneous nodule in the scalp.  This looks like an epidermoid cyst.  She thinks it is a stable.  Continue to monitor.       Insomnia.  Continue trazodone.      Wall thickening of esophagus and FDG uptake of fundus of the stomach.  It could be in the setting of inflammation from recent nausea and vomiting.  Symptoms have resolved.  Continue to monitor clinically.          Vision changes.    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  It was thought that this could be due to Taxol as patient reported to the ophthalmologist that she was on Taxol in September 2019 when her symptoms started.  But she was not on Taxol in September 2019 when she started noticing the visual changes so Taxol is not responsible for this.  The first dose of Taxol was on 11/5/2019.   She had left cataract extraction on 2/8/2021 and she has noticed significant improvement in her vision.  She plans to do cataract extraction of the right eye in couple of weeks.          Increased FDG ability in the colon.  She had  FDG uptake in the cecum and ascending colon but no corresponding lesion was seen on the CT scan.  She is completely asymptomatic so at this time we will continue to observe.    Discussion regarding healthcare directives.  On previous visit she told me that she will bring the health care directive for our records but she forgot so I told her to bring it on next visit.      Breast implant removal.  She tells me that she was planning to do breast implant removal because there was a recall on this.  Her surgery was scheduled for 9/24/2019.  Because of this new development of metastatic breast cancer and her recent radiation, surgery has been canceled.  We discussed that at this time treatment for metastatic breast cancer would take precedence over the other surgery as the other surgery would require her to be off chemotherapy for several weeks and in that case the chances of cancer progression would be high and I would not recommend that.    Multiple sclerosis.  She will continue to follow with her neurologist Dr. Quiles.  Currently multiple sclerosis is under control and currently she is not taking any medications.      Return to clinic 4 weeks      All of her questions were answered to her satisfaction.  She is agreeable and comfortable with the plan.    Dewayne Zepeda MD            Again, thank you for allowing me to participate in the care of your patient.        Sincerely,        Dewayne Zepeda MD

## 2021-11-09 NOTE — PATIENT INSTRUCTIONS
Try imodium as we discussed    No chemo today    Delay chemo by 1 week    Make adjustments to follow up appointments accordingly

## 2021-11-10 DIAGNOSIS — C50.919 METASTATIC BREAST CANCER: ICD-10-CM

## 2021-11-10 DIAGNOSIS — G89.3 CANCER ASSOCIATED PAIN: ICD-10-CM

## 2021-11-10 RX ORDER — MORPHINE SULFATE 15 MG/1
30 TABLET, FILM COATED, EXTENDED RELEASE ORAL EVERY 12 HOURS
Qty: 120 TABLET | Refills: 0 | Status: SHIPPED | OUTPATIENT
Start: 2021-11-10 | End: 2021-12-30

## 2021-11-10 RX ORDER — MORPHINE SULFATE 15 MG/1
15-30 TABLET ORAL
Qty: 120 TABLET | Refills: 0 | Status: SHIPPED | OUTPATIENT
Start: 2021-11-10 | End: 2022-01-21

## 2021-11-10 NOTE — TELEPHONE ENCOUNTER
Received [email or web address] message from patient requesting refill of MS Contin, MS IR.     Last refill: 9/20/21  Last office visit: 9/29/21  Scheduled for follow up 12/22/21    Will route request to MD for review.     Reviewed [place]  Report.

## 2021-11-11 DIAGNOSIS — T45.1X5A CHEMOTHERAPY-INDUCED NEUTROPENIA (H): Primary | ICD-10-CM

## 2021-11-11 DIAGNOSIS — C50.911 BILATERAL MALIGNANT NEOPLASM OF BREAST IN FEMALE, UNSPECIFIED ESTROGEN RECEPTOR STATUS, UNSPECIFIED SITE OF BREAST (H): ICD-10-CM

## 2021-11-11 DIAGNOSIS — C50.912 BILATERAL MALIGNANT NEOPLASM OF BREAST IN FEMALE, UNSPECIFIED ESTROGEN RECEPTOR STATUS, UNSPECIFIED SITE OF BREAST (H): ICD-10-CM

## 2021-11-11 DIAGNOSIS — C50.919 METASTATIC BREAST CANCER: ICD-10-CM

## 2021-11-11 DIAGNOSIS — D70.1 CHEMOTHERAPY-INDUCED NEUTROPENIA (H): Primary | ICD-10-CM

## 2021-11-11 DIAGNOSIS — E87.6 HYPOKALEMIA: ICD-10-CM

## 2021-11-11 RX ORDER — EPINEPHRINE 1 MG/ML
0.3 INJECTION, SOLUTION INTRAMUSCULAR; SUBCUTANEOUS EVERY 5 MIN PRN
Status: CANCELLED | OUTPATIENT
Start: 2021-11-22

## 2021-11-11 RX ORDER — LORAZEPAM 2 MG/ML
0.5 INJECTION INTRAMUSCULAR EVERY 4 HOURS PRN
Status: CANCELLED
Start: 2021-11-16

## 2021-11-11 RX ORDER — MEPERIDINE HYDROCHLORIDE 25 MG/ML
25 INJECTION INTRAMUSCULAR; INTRAVENOUS; SUBCUTANEOUS EVERY 30 MIN PRN
Status: CANCELLED | OUTPATIENT
Start: 2021-11-22

## 2021-11-11 RX ORDER — DIPHENHYDRAMINE HCL 25 MG
50 CAPSULE ORAL ONCE
Status: CANCELLED | OUTPATIENT
Start: 2021-11-16

## 2021-11-11 RX ORDER — ALBUTEROL SULFATE 0.83 MG/ML
2.5 SOLUTION RESPIRATORY (INHALATION)
Status: CANCELLED | OUTPATIENT
Start: 2021-11-22

## 2021-11-11 RX ORDER — DIPHENHYDRAMINE HYDROCHLORIDE 50 MG/ML
50 INJECTION INTRAMUSCULAR; INTRAVENOUS
Status: CANCELLED
Start: 2021-11-16

## 2021-11-11 RX ORDER — METHYLPREDNISOLONE SODIUM SUCCINATE 125 MG/2ML
125 INJECTION, POWDER, LYOPHILIZED, FOR SOLUTION INTRAMUSCULAR; INTRAVENOUS
Status: CANCELLED
Start: 2021-11-16

## 2021-11-11 RX ORDER — NALOXONE HYDROCHLORIDE 0.4 MG/ML
.1-.4 INJECTION, SOLUTION INTRAMUSCULAR; INTRAVENOUS; SUBCUTANEOUS
Status: CANCELLED | OUTPATIENT
Start: 2021-11-22

## 2021-11-11 RX ORDER — ATROPINE SULFATE 0.4 MG/ML
0.4 AMPUL (ML) INJECTION
Status: CANCELLED | OUTPATIENT
Start: 2021-11-16

## 2021-11-11 RX ORDER — EPINEPHRINE 1 MG/ML
0.3 INJECTION, SOLUTION INTRAMUSCULAR; SUBCUTANEOUS EVERY 5 MIN PRN
Status: CANCELLED | OUTPATIENT
Start: 2021-11-16

## 2021-11-11 RX ORDER — LORAZEPAM 2 MG/ML
0.5 INJECTION INTRAMUSCULAR EVERY 4 HOURS PRN
Status: CANCELLED
Start: 2021-11-22

## 2021-11-11 RX ORDER — ATROPINE SULFATE 0.4 MG/ML
0.4 AMPUL (ML) INJECTION
Status: CANCELLED | OUTPATIENT
Start: 2021-11-22

## 2021-11-11 RX ORDER — DIPHENHYDRAMINE HCL 25 MG
50 CAPSULE ORAL ONCE
Status: CANCELLED | OUTPATIENT
Start: 2021-11-22

## 2021-11-11 RX ORDER — ACETAMINOPHEN 325 MG/1
650 TABLET ORAL ONCE
Status: CANCELLED | OUTPATIENT
Start: 2021-11-22

## 2021-11-11 RX ORDER — HEPARIN SODIUM,PORCINE 10 UNIT/ML
5 VIAL (ML) INTRAVENOUS
Status: CANCELLED | OUTPATIENT
Start: 2021-11-22

## 2021-11-11 RX ORDER — SODIUM CHLORIDE 9 MG/ML
1000 INJECTION, SOLUTION INTRAVENOUS CONTINUOUS PRN
Status: CANCELLED
Start: 2021-11-22

## 2021-11-11 RX ORDER — SODIUM CHLORIDE 9 MG/ML
1000 INJECTION, SOLUTION INTRAVENOUS CONTINUOUS PRN
Status: CANCELLED
Start: 2021-11-16

## 2021-11-11 RX ORDER — DIPHENHYDRAMINE HYDROCHLORIDE 50 MG/ML
50 INJECTION INTRAMUSCULAR; INTRAVENOUS
Status: CANCELLED
Start: 2021-11-22

## 2021-11-11 RX ORDER — HEPARIN SODIUM (PORCINE) LOCK FLUSH IV SOLN 100 UNIT/ML 100 UNIT/ML
5 SOLUTION INTRAVENOUS
Status: CANCELLED | OUTPATIENT
Start: 2021-11-16

## 2021-11-11 RX ORDER — ALBUTEROL SULFATE 0.83 MG/ML
2.5 SOLUTION RESPIRATORY (INHALATION)
Status: CANCELLED | OUTPATIENT
Start: 2021-11-16

## 2021-11-11 RX ORDER — HEPARIN SODIUM (PORCINE) LOCK FLUSH IV SOLN 100 UNIT/ML 100 UNIT/ML
5 SOLUTION INTRAVENOUS
Status: CANCELLED | OUTPATIENT
Start: 2021-11-22

## 2021-11-11 RX ORDER — METHYLPREDNISOLONE SODIUM SUCCINATE 125 MG/2ML
125 INJECTION, POWDER, LYOPHILIZED, FOR SOLUTION INTRAMUSCULAR; INTRAVENOUS
Status: CANCELLED
Start: 2021-11-22

## 2021-11-11 RX ORDER — HEPARIN SODIUM,PORCINE 10 UNIT/ML
5 VIAL (ML) INTRAVENOUS
Status: CANCELLED | OUTPATIENT
Start: 2021-11-16

## 2021-11-11 RX ORDER — NALOXONE HYDROCHLORIDE 0.4 MG/ML
.1-.4 INJECTION, SOLUTION INTRAMUSCULAR; INTRAVENOUS; SUBCUTANEOUS
Status: CANCELLED | OUTPATIENT
Start: 2021-11-16

## 2021-11-11 RX ORDER — ACETAMINOPHEN 325 MG/1
650 TABLET ORAL ONCE
Status: CANCELLED | OUTPATIENT
Start: 2021-11-16

## 2021-11-11 RX ORDER — MEPERIDINE HYDROCHLORIDE 25 MG/ML
25 INJECTION INTRAMUSCULAR; INTRAVENOUS; SUBCUTANEOUS EVERY 30 MIN PRN
Status: CANCELLED | OUTPATIENT
Start: 2021-11-16

## 2021-11-11 RX ORDER — ALBUTEROL SULFATE 90 UG/1
1-2 AEROSOL, METERED RESPIRATORY (INHALATION)
Status: CANCELLED
Start: 2021-11-16

## 2021-11-11 RX ORDER — ALBUTEROL SULFATE 90 UG/1
1-2 AEROSOL, METERED RESPIRATORY (INHALATION)
Status: CANCELLED
Start: 2021-11-22

## 2021-11-12 ENCOUNTER — PATIENT OUTREACH (OUTPATIENT)
Dept: CARE COORDINATION | Facility: CLINIC | Age: 61
End: 2021-11-12
Payer: COMMERCIAL

## 2021-11-12 NOTE — PROGRESS NOTES
Social Work Note: Telephone Call  Oncology Clinic    Data/Intervention:  Patient Name:  Shaneka Alvarez  /Age:  1960 (61 year old)    Call From:  SHREYA  Reason for Call:  Insurance resource questions    Assessment:  SHREYA received referral that Shaneka had questions regarding insurance coverage during a gap that is coming up for her. SHREYA called and spoke to Shaneka this afternoon, introducing self and reason for call. Shaneka started her Medicare on November first and uses her COBRA coverage as her secondary. Unfortunately, that will end  and her coverage from her  won't start until . She worries about the lack of a secondary during that window. SHREYA spoke to Shaneka about [employer] and being able to purchase a plan to cover those dates. Shaneka states she did speak with her current plan and they advised she could pay them for one month extra and she could maintain that coverage. SHREYA encouraged her to consider that option, or again, choose a plan through [employer].    Shaneka was confused about her life insurance coverage that she thought she maintained through her COBRA. SHREYA explained that typically is not the case, but encouraged Shaneka to speak to her former employer about whether or not she had maintained some life insurance benefit.     Plan:  SW will remain available as needed and appropriate.     CHARLOTTE Johnson, [facility]  Clinical , Adult Oncology  Phone: [phone]

## 2021-11-15 DIAGNOSIS — C50.919 METASTATIC BREAST CANCER: ICD-10-CM

## 2021-11-15 DIAGNOSIS — C79.51 BONE METASTASES: Primary | ICD-10-CM

## 2021-11-16 ENCOUNTER — INFUSION THERAPY VISIT (OUTPATIENT)
Dept: INFUSION THERAPY | Facility: CLINIC | Age: 61
End: 2021-11-16
Payer: COMMERCIAL

## 2021-11-16 ENCOUNTER — LAB (OUTPATIENT)
Dept: ONCOLOGY | Facility: CLINIC | Age: 61
End: 2021-11-16
Payer: COMMERCIAL

## 2021-11-16 VITALS
RESPIRATION RATE: 16 BRPM | WEIGHT: 143 LBS | DIASTOLIC BLOOD PRESSURE: 87 MMHG | HEART RATE: 68 BPM | BODY MASS INDEX: 26.16 KG/M2 | OXYGEN SATURATION: 99 % | TEMPERATURE: 98.1 F | SYSTOLIC BLOOD PRESSURE: 132 MMHG

## 2021-11-16 DIAGNOSIS — C50.911 BILATERAL MALIGNANT NEOPLASM OF BREAST IN FEMALE, UNSPECIFIED ESTROGEN RECEPTOR STATUS, UNSPECIFIED SITE OF BREAST (H): ICD-10-CM

## 2021-11-16 DIAGNOSIS — T45.1X5A CHEMOTHERAPY-INDUCED NEUTROPENIA (H): ICD-10-CM

## 2021-11-16 DIAGNOSIS — C50.919 METASTATIC BREAST CANCER: ICD-10-CM

## 2021-11-16 DIAGNOSIS — C79.51 BONE METASTASES: Primary | ICD-10-CM

## 2021-11-16 DIAGNOSIS — C50.912 BILATERAL MALIGNANT NEOPLASM OF BREAST IN FEMALE, UNSPECIFIED ESTROGEN RECEPTOR STATUS, UNSPECIFIED SITE OF BREAST (H): ICD-10-CM

## 2021-11-16 DIAGNOSIS — D70.1 CHEMOTHERAPY-INDUCED NEUTROPENIA (H): ICD-10-CM

## 2021-11-16 DIAGNOSIS — E87.6 HYPOKALEMIA: ICD-10-CM

## 2021-11-16 DIAGNOSIS — C79.51 BONE METASTASES: ICD-10-CM

## 2021-11-16 DIAGNOSIS — T45.1X5A CHEMOTHERAPY-INDUCED NEUTROPENIA (H): Primary | ICD-10-CM

## 2021-11-16 DIAGNOSIS — D70.1 CHEMOTHERAPY-INDUCED NEUTROPENIA (H): Primary | ICD-10-CM

## 2021-11-16 LAB
ALBUMIN SERPL-MCNC: 3.2 G/DL (ref 3.4–5)
ALP SERPL-CCNC: 66 U/L (ref 40–150)
ALT SERPL W P-5'-P-CCNC: 33 U/L (ref 0–50)
ANION GAP SERPL CALCULATED.3IONS-SCNC: 4 MMOL/L (ref 3–14)
AST SERPL W P-5'-P-CCNC: 28 U/L (ref 0–45)
BASOPHILS # BLD AUTO: 0.1 10E3/UL (ref 0–0.2)
BASOPHILS NFR BLD AUTO: 2 %
BILIRUB SERPL-MCNC: 0.2 MG/DL (ref 0.2–1.3)
BUN SERPL-MCNC: 8 MG/DL (ref 7–30)
CALCIUM SERPL-MCNC: 8.4 MG/DL (ref 8.5–10.1)
CANCER AG27-29 SERPL-ACNC: 940 U/ML (ref 0–39)
CHLORIDE BLD-SCNC: 104 MMOL/L (ref 94–109)
CO2 SERPL-SCNC: 28 MMOL/L (ref 20–32)
CREAT SERPL-MCNC: 0.57 MG/DL (ref 0.52–1.04)
EOSINOPHIL # BLD AUTO: 0.2 10E3/UL (ref 0–0.7)
EOSINOPHIL NFR BLD AUTO: 5 %
ERYTHROCYTE [DISTWIDTH] IN BLOOD BY AUTOMATED COUNT: 14.6 % (ref 10–15)
GFR SERPL CREATININE-BSD FRML MDRD: >90 ML/MIN/1.73M2
GLUCOSE BLD-MCNC: 101 MG/DL (ref 70–99)
HCT VFR BLD AUTO: 36.8 % (ref 35–47)
HGB BLD-MCNC: 11.9 G/DL (ref 11.7–15.7)
IMM GRANULOCYTES # BLD: 0.1 10E3/UL
IMM GRANULOCYTES NFR BLD: 1 %
LYMPHOCYTES # BLD AUTO: 0.7 10E3/UL (ref 0.8–5.3)
LYMPHOCYTES NFR BLD AUTO: 19 %
MAGNESIUM SERPL-MCNC: 2 MG/DL (ref 1.6–2.3)
MCH RBC QN AUTO: 28.6 PG (ref 26.5–33)
MCHC RBC AUTO-ENTMCNC: 32.3 G/DL (ref 31.5–36.5)
MCV RBC AUTO: 89 FL (ref 78–100)
MONOCYTES # BLD AUTO: 0.5 10E3/UL (ref 0–1.3)
MONOCYTES NFR BLD AUTO: 15 %
NEUTROPHILS # BLD AUTO: 2 10E3/UL (ref 1.6–8.3)
NEUTROPHILS NFR BLD AUTO: 58 %
NRBC # BLD AUTO: 0 10E3/UL
NRBC BLD AUTO-RTO: 0 /100
PHOSPHATE SERPL-MCNC: 3.9 MG/DL (ref 2.5–4.5)
PLATELET # BLD AUTO: 214 10E3/UL (ref 150–450)
POTASSIUM BLD-SCNC: 4 MMOL/L (ref 3.4–5.3)
PROT SERPL-MCNC: 6.4 G/DL (ref 6.8–8.8)
RBC # BLD AUTO: 4.16 10E6/UL (ref 3.8–5.2)
SODIUM SERPL-SCNC: 136 MMOL/L (ref 133–144)
WBC # BLD AUTO: 3.5 10E3/UL (ref 4–11)

## 2021-11-16 PROCEDURE — 86300 IMMUNOASSAY TUMOR CA 15-3: CPT | Performed by: INTERNAL MEDICINE

## 2021-11-16 PROCEDURE — 99207 PR NO CHARGE LOS: CPT

## 2021-11-16 PROCEDURE — 84100 ASSAY OF PHOSPHORUS: CPT | Performed by: INTERNAL MEDICINE

## 2021-11-16 PROCEDURE — 85025 COMPLETE CBC W/AUTO DIFF WBC: CPT | Performed by: INTERNAL MEDICINE

## 2021-11-16 PROCEDURE — 83735 ASSAY OF MAGNESIUM: CPT | Performed by: INTERNAL MEDICINE

## 2021-11-16 PROCEDURE — 80053 COMPREHEN METABOLIC PANEL: CPT | Performed by: INTERNAL MEDICINE

## 2021-11-16 PROCEDURE — 96413 CHEMO IV INFUSION 1 HR: CPT | Performed by: NURSE PRACTITIONER

## 2021-11-16 PROCEDURE — S0028 INJECTION, FAMOTIDINE, 20 MG: HCPCS | Performed by: NURSE PRACTITIONER

## 2021-11-16 PROCEDURE — 96367 TX/PROPH/DG ADDL SEQ IV INF: CPT | Performed by: NURSE PRACTITIONER

## 2021-11-16 PROCEDURE — 96375 TX/PRO/DX INJ NEW DRUG ADDON: CPT | Performed by: NURSE PRACTITIONER

## 2021-11-16 RX ORDER — DIPHENHYDRAMINE HCL 25 MG
50 CAPSULE ORAL ONCE
Status: COMPLETED | OUTPATIENT
Start: 2021-11-16 | End: 2021-11-16

## 2021-11-16 RX ORDER — HEPARIN SODIUM (PORCINE) LOCK FLUSH IV SOLN 100 UNIT/ML 100 UNIT/ML
5 SOLUTION INTRAVENOUS EVERY 8 HOURS
Status: DISCONTINUED | OUTPATIENT
Start: 2021-11-16 | End: 2021-11-16 | Stop reason: HOSPADM

## 2021-11-16 RX ORDER — ACETAMINOPHEN 325 MG/1
650 TABLET ORAL ONCE
Status: COMPLETED | OUTPATIENT
Start: 2021-11-16 | End: 2021-11-16

## 2021-11-16 RX ORDER — HEPARIN SODIUM (PORCINE) LOCK FLUSH IV SOLN 100 UNIT/ML 100 UNIT/ML
5 SOLUTION INTRAVENOUS
Status: DISCONTINUED | OUTPATIENT
Start: 2021-11-16 | End: 2021-11-16 | Stop reason: HOSPADM

## 2021-11-16 RX ADMIN — HEPARIN SODIUM (PORCINE) LOCK FLUSH IV SOLN 100 UNIT/ML 5 ML: 100 SOLUTION at 14:09

## 2021-11-16 RX ADMIN — HEPARIN SODIUM (PORCINE) LOCK FLUSH IV SOLN 100 UNIT/ML 5 ML: 100 SOLUTION at 11:00

## 2021-11-16 RX ADMIN — Medication 500 ML: at 11:32

## 2021-11-16 RX ADMIN — ACETAMINOPHEN 650 MG: 325 TABLET ORAL at 11:25

## 2021-11-16 RX ADMIN — Medication 50 MG: at 11:25

## 2021-11-16 ASSESSMENT — PAIN SCALES - GENERAL: PAINLEVEL: NO PAIN (0)

## 2021-11-16 NOTE — PROGRESS NOTES
SPIRITUAL HEALTH SERVICES  SPIRITUAL ASSESSMENT Progress Note  [facility]       REFERRAL SOURCE:  self referral     Visited with Shaneka Alvarez  in the [facility] center  for ongoing emotional/spiritual support. Patient will be having her 's children over for thanksgiving so it will be busy as they are coming from out-of-town.  She is looking for a person to help clean her house going forward as that is getting more taxing.  Offered encouragement to take care of her self during this busy time.     PLAN: Patient  knows that she can request a Spiritual Health encounter as needed.     Gina Pineda  Staff   [phone]

## 2021-11-16 NOTE — PROGRESS NOTES
Port needle left for access for treatment, Sterile technique performed and maintained. Patient tolerated procedure well. Tubes drawn in rainbow order: red, green, purple. Transparent dressing placed with use of tegaderm.    Iveth Durham RN  [facility] Oncology/Infusion [place]

## 2021-11-16 NOTE — PROGRESS NOTES
Infusion Nursing Note:  Shaneka Alvarez presents today for C28D1 Trodelvy.    Patient seen by provider today: No   present during visit today: Not Applicable.    Note: N/A.    Intravenous Access:  Implanted Port.    Treatment Conditions:  Lab Results   Component Value Date    HGB 11.9 11/16/2021    WBC 3.5 (L) 11/16/2021    ANEU 1.9 10/26/2021    ANEUTAUTO 2.0 11/16/2021     11/16/2021      Lab Results   Component Value Date     11/16/2021    POTASSIUM 4.0 11/16/2021    MAG 2.0 11/16/2021    CR 0.57 11/16/2021    RAFAELA 8.4 (L) 11/16/2021    BILITOTAL 0.2 11/16/2021    ALBUMIN 3.2 (L) 11/16/2021    ALT 33 11/16/2021    AST 28 11/16/2021     Results reviewed, labs MET treatment parameters, ok to proceed with treatment.    Post Infusion Assessment:  Patient tolerated infusion without incident.  Blood return noted pre and post infusion.  Site patent and intact, free from redness, edema or discomfort.  No evidence of extravasations.  Access discontinued per protocol.     Discharge Plan:   Patient will return 11/22/2021 for next appointment.   Patient discharged in stable condition accompanied by: self.  Departure Mode: Ambulatory.    Joanne Ludiwg RN-BSN, PHN, OCN  [facility]

## 2021-11-22 ENCOUNTER — LAB (OUTPATIENT)
Dept: ONCOLOGY | Facility: CLINIC | Age: 61
End: 2021-11-22
Payer: MEDICARE

## 2021-11-22 ENCOUNTER — INFUSION THERAPY VISIT (OUTPATIENT)
Dept: INFUSION THERAPY | Facility: CLINIC | Age: 61
End: 2021-11-22
Payer: COMMERCIAL

## 2021-11-22 VITALS
OXYGEN SATURATION: 98 % | TEMPERATURE: 98.2 F | BODY MASS INDEX: 26.41 KG/M2 | DIASTOLIC BLOOD PRESSURE: 90 MMHG | RESPIRATION RATE: 16 BRPM | SYSTOLIC BLOOD PRESSURE: 128 MMHG | WEIGHT: 144.4 LBS | HEART RATE: 77 BPM

## 2021-11-22 DIAGNOSIS — C50.911 BILATERAL MALIGNANT NEOPLASM OF BREAST IN FEMALE, UNSPECIFIED ESTROGEN RECEPTOR STATUS, UNSPECIFIED SITE OF BREAST (H): ICD-10-CM

## 2021-11-22 DIAGNOSIS — D70.1 CHEMOTHERAPY-INDUCED NEUTROPENIA (H): Primary | ICD-10-CM

## 2021-11-22 DIAGNOSIS — T45.1X5A CHEMOTHERAPY-INDUCED NEUTROPENIA (H): Primary | ICD-10-CM

## 2021-11-22 DIAGNOSIS — T45.1X5A CHEMOTHERAPY-INDUCED NEUTROPENIA (H): ICD-10-CM

## 2021-11-22 DIAGNOSIS — C50.919 METASTATIC BREAST CANCER: ICD-10-CM

## 2021-11-22 DIAGNOSIS — E87.6 HYPOKALEMIA: Primary | ICD-10-CM

## 2021-11-22 DIAGNOSIS — C79.51 BONE METASTASES: ICD-10-CM

## 2021-11-22 DIAGNOSIS — E87.6 HYPOKALEMIA: ICD-10-CM

## 2021-11-22 DIAGNOSIS — C50.912 BILATERAL MALIGNANT NEOPLASM OF BREAST IN FEMALE, UNSPECIFIED ESTROGEN RECEPTOR STATUS, UNSPECIFIED SITE OF BREAST (H): ICD-10-CM

## 2021-11-22 DIAGNOSIS — D70.1 CHEMOTHERAPY-INDUCED NEUTROPENIA (H): ICD-10-CM

## 2021-11-22 LAB
ALBUMIN SERPL-MCNC: 3.4 G/DL (ref 3.4–5)
ALP SERPL-CCNC: 66 U/L (ref 40–150)
ALT SERPL W P-5'-P-CCNC: 34 U/L (ref 0–50)
ANION GAP SERPL CALCULATED.3IONS-SCNC: 4 MMOL/L (ref 3–14)
AST SERPL W P-5'-P-CCNC: 24 U/L (ref 0–45)
BASOPHILS # BLD AUTO: 0 10E3/UL (ref 0–0.2)
BASOPHILS NFR BLD AUTO: 1 %
BILIRUB SERPL-MCNC: 0.3 MG/DL (ref 0.2–1.3)
BUN SERPL-MCNC: 12 MG/DL (ref 7–30)
CALCIUM SERPL-MCNC: 9.1 MG/DL (ref 8.5–10.1)
CHLORIDE BLD-SCNC: 103 MMOL/L (ref 94–109)
CO2 SERPL-SCNC: 30 MMOL/L (ref 20–32)
CREAT SERPL-MCNC: 0.66 MG/DL (ref 0.52–1.04)
EOSINOPHIL # BLD AUTO: 0.1 10E3/UL (ref 0–0.7)
EOSINOPHIL NFR BLD AUTO: 3 %
ERYTHROCYTE [DISTWIDTH] IN BLOOD BY AUTOMATED COUNT: 14.4 % (ref 10–15)
GFR SERPL CREATININE-BSD FRML MDRD: >90 ML/MIN/1.73M2
GLUCOSE BLD-MCNC: 100 MG/DL (ref 70–99)
HCT VFR BLD AUTO: 36.2 % (ref 35–47)
HGB BLD-MCNC: 11.7 G/DL (ref 11.7–15.7)
IMM GRANULOCYTES # BLD: 0.1 10E3/UL
IMM GRANULOCYTES NFR BLD: 1 %
LYMPHOCYTES # BLD AUTO: 0.8 10E3/UL (ref 0.8–5.3)
LYMPHOCYTES NFR BLD AUTO: 17 %
MAGNESIUM SERPL-MCNC: 2.2 MG/DL (ref 1.6–2.3)
MCH RBC QN AUTO: 28.6 PG (ref 26.5–33)
MCHC RBC AUTO-ENTMCNC: 32.3 G/DL (ref 31.5–36.5)
MCV RBC AUTO: 89 FL (ref 78–100)
MONOCYTES # BLD AUTO: 0.3 10E3/UL (ref 0–1.3)
MONOCYTES NFR BLD AUTO: 7 %
NEUTROPHILS # BLD AUTO: 3.3 10E3/UL (ref 1.6–8.3)
NEUTROPHILS NFR BLD AUTO: 71 %
NRBC # BLD AUTO: 0 10E3/UL
NRBC BLD AUTO-RTO: 0 /100
PHOSPHATE SERPL-MCNC: 3.7 MG/DL (ref 2.5–4.5)
PLATELET # BLD AUTO: 209 10E3/UL (ref 150–450)
POTASSIUM BLD-SCNC: 4.1 MMOL/L (ref 3.4–5.3)
PROT SERPL-MCNC: 6.7 G/DL (ref 6.8–8.8)
RBC # BLD AUTO: 4.09 10E6/UL (ref 3.8–5.2)
SODIUM SERPL-SCNC: 137 MMOL/L (ref 133–144)
WBC # BLD AUTO: 4.6 10E3/UL (ref 4–11)

## 2021-11-22 PROCEDURE — 99207 PR NO CHARGE LOS: CPT

## 2021-11-22 PROCEDURE — S0028 INJECTION, FAMOTIDINE, 20 MG: HCPCS | Performed by: NURSE PRACTITIONER

## 2021-11-22 PROCEDURE — 96367 TX/PROPH/DG ADDL SEQ IV INF: CPT | Performed by: NURSE PRACTITIONER

## 2021-11-22 PROCEDURE — 85025 COMPLETE CBC W/AUTO DIFF WBC: CPT | Performed by: INTERNAL MEDICINE

## 2021-11-22 PROCEDURE — 83735 ASSAY OF MAGNESIUM: CPT | Performed by: INTERNAL MEDICINE

## 2021-11-22 PROCEDURE — 84100 ASSAY OF PHOSPHORUS: CPT | Performed by: INTERNAL MEDICINE

## 2021-11-22 PROCEDURE — 80053 COMPREHEN METABOLIC PANEL: CPT | Performed by: INTERNAL MEDICINE

## 2021-11-22 PROCEDURE — 96413 CHEMO IV INFUSION 1 HR: CPT | Performed by: NURSE PRACTITIONER

## 2021-11-22 PROCEDURE — 96375 TX/PRO/DX INJ NEW DRUG ADDON: CPT | Performed by: NURSE PRACTITIONER

## 2021-11-22 PROCEDURE — 96372 THER/PROPH/DIAG INJ SC/IM: CPT | Mod: 59 | Performed by: NURSE PRACTITIONER

## 2021-11-22 RX ORDER — HEPARIN SODIUM (PORCINE) LOCK FLUSH IV SOLN 100 UNIT/ML 100 UNIT/ML
5 SOLUTION INTRAVENOUS
Status: DISCONTINUED | OUTPATIENT
Start: 2021-11-22 | End: 2021-11-22 | Stop reason: HOSPADM

## 2021-11-22 RX ORDER — ACETAMINOPHEN 325 MG/1
650 TABLET ORAL ONCE
Status: COMPLETED | OUTPATIENT
Start: 2021-11-22 | End: 2021-11-22

## 2021-11-22 RX ORDER — DIPHENHYDRAMINE HCL 25 MG
50 CAPSULE ORAL ONCE
Status: COMPLETED | OUTPATIENT
Start: 2021-11-22 | End: 2021-11-22

## 2021-11-22 RX ORDER — HEPARIN SODIUM (PORCINE) LOCK FLUSH IV SOLN 100 UNIT/ML 100 UNIT/ML
5 SOLUTION INTRAVENOUS EVERY 8 HOURS
Status: DISCONTINUED | OUTPATIENT
Start: 2021-11-22 | End: 2021-11-22 | Stop reason: HOSPADM

## 2021-11-22 RX ADMIN — ACETAMINOPHEN 650 MG: 325 TABLET ORAL at 13:49

## 2021-11-22 RX ADMIN — Medication 500 ML: at 13:54

## 2021-11-22 RX ADMIN — HEPARIN SODIUM (PORCINE) LOCK FLUSH IV SOLN 100 UNIT/ML 5 ML: 100 SOLUTION at 16:26

## 2021-11-22 RX ADMIN — HEPARIN SODIUM (PORCINE) LOCK FLUSH IV SOLN 100 UNIT/ML 5 ML: 100 SOLUTION at 13:19

## 2021-11-22 RX ADMIN — Medication 50 MG: at 13:49

## 2021-11-22 ASSESSMENT — PAIN SCALES - GENERAL: PAINLEVEL: NO PAIN (0)

## 2021-11-22 NOTE — PROGRESS NOTES
Infusion Nursing Note:  Shaneka Alvarez presents today for Trodelvy + xgeva.    Patient seen by provider today: No   present during visit today: Not Applicable.    Note: N/A.      Intravenous Access:  Implanted Port.    Treatment Conditions:  Lab Results   Component Value Date    HGB 11.7 11/22/2021    WBC 4.6 11/22/2021    ANEU 1.9 10/26/2021    ANEUTAUTO 3.3 11/22/2021     11/22/2021      Lab Results   Component Value Date     11/22/2021    POTASSIUM 4.1 11/22/2021    MAG 2.2 11/22/2021    CR 0.66 11/22/2021    RAFAELA 9.1 11/22/2021    BILITOTAL 0.3 11/22/2021    ALBUMIN 3.4 11/22/2021    ALT 34 11/22/2021    AST 24 11/22/2021     Results reviewed, labs MET treatment parameters, ok to proceed with treatment.      Post Infusion Assessment:  Patient tolerated infusion without incident.  Patient observed for 30 minutes post Trodelvy per protocol.  Blood return noted pre and post infusion.  Site patent and intact, free from redness, edema or discomfort.  No evidence of extravasations.  Access discontinued per protocol.       Discharge Plan:   Discharge instructions reviewed with: Patient.  Patient and/or family verbalized understanding of discharge instructions and all questions answered.  Patient discharged in stable condition accompanied by: self.  Departure Mode: Ambulatory.      Yanci Kate RN

## 2021-11-22 NOTE — PROGRESS NOTES
Port needle left for access for treatment, Sterile technique performed and maintained. Patient tolerated procedure well. Tubes drawn in rainbow order: red, green, purple. Transparent dressing placed with use of tegaderm.    Iveth Durham RN  [facility] Oncology/Infusion [place]

## 2021-12-07 ENCOUNTER — VIRTUAL VISIT (OUTPATIENT)
Dept: ONCOLOGY | Facility: CLINIC | Age: 61
End: 2021-12-07
Attending: INTERNAL MEDICINE
Payer: MEDICARE

## 2021-12-07 ENCOUNTER — INFUSION THERAPY VISIT (OUTPATIENT)
Dept: INFUSION THERAPY | Facility: CLINIC | Age: 61
End: 2021-12-07
Payer: MEDICARE

## 2021-12-07 ENCOUNTER — LAB (OUTPATIENT)
Dept: ONCOLOGY | Facility: CLINIC | Age: 61
End: 2021-12-07
Payer: MEDICARE

## 2021-12-07 VITALS
DIASTOLIC BLOOD PRESSURE: 81 MMHG | HEART RATE: 74 BPM | OXYGEN SATURATION: 100 % | WEIGHT: 143.8 LBS | TEMPERATURE: 98.2 F | RESPIRATION RATE: 16 BRPM | SYSTOLIC BLOOD PRESSURE: 114 MMHG | BODY MASS INDEX: 26.3 KG/M2

## 2021-12-07 DIAGNOSIS — C50.912 BILATERAL MALIGNANT NEOPLASM OF BREAST IN FEMALE, UNSPECIFIED ESTROGEN RECEPTOR STATUS, UNSPECIFIED SITE OF BREAST (H): ICD-10-CM

## 2021-12-07 DIAGNOSIS — T45.1X5A CHEMOTHERAPY-INDUCED NEUTROPENIA (H): ICD-10-CM

## 2021-12-07 DIAGNOSIS — T45.1X5A CHEMOTHERAPY-INDUCED NEUTROPENIA (H): Primary | ICD-10-CM

## 2021-12-07 DIAGNOSIS — D70.1 CHEMOTHERAPY-INDUCED NEUTROPENIA (H): ICD-10-CM

## 2021-12-07 DIAGNOSIS — E87.6 HYPOKALEMIA: Primary | ICD-10-CM

## 2021-12-07 DIAGNOSIS — C50.919 METASTATIC BREAST CANCER: ICD-10-CM

## 2021-12-07 DIAGNOSIS — E87.6 HYPOKALEMIA: ICD-10-CM

## 2021-12-07 DIAGNOSIS — C50.911 BILATERAL MALIGNANT NEOPLASM OF BREAST IN FEMALE, UNSPECIFIED ESTROGEN RECEPTOR STATUS, UNSPECIFIED SITE OF BREAST (H): ICD-10-CM

## 2021-12-07 DIAGNOSIS — D70.1 CHEMOTHERAPY-INDUCED NEUTROPENIA (H): Primary | ICD-10-CM

## 2021-12-07 LAB
ALBUMIN SERPL-MCNC: 3.6 G/DL (ref 3.4–5)
ALP SERPL-CCNC: 64 U/L (ref 40–150)
ALT SERPL W P-5'-P-CCNC: 27 U/L (ref 0–50)
ANION GAP SERPL CALCULATED.3IONS-SCNC: 4 MMOL/L (ref 3–14)
AST SERPL W P-5'-P-CCNC: 19 U/L (ref 0–45)
BASOPHILS # BLD AUTO: 0 10E3/UL (ref 0–0.2)
BASOPHILS NFR BLD AUTO: 1 %
BILIRUB SERPL-MCNC: 0.3 MG/DL (ref 0.2–1.3)
BUN SERPL-MCNC: 16 MG/DL (ref 7–30)
CALCIUM SERPL-MCNC: 9.4 MG/DL (ref 8.5–10.1)
CANCER AG27-29 SERPL-ACNC: 1206 U/ML (ref 0–39)
CHLORIDE BLD-SCNC: 104 MMOL/L (ref 94–109)
CO2 SERPL-SCNC: 31 MMOL/L (ref 20–32)
CREAT SERPL-MCNC: 0.72 MG/DL (ref 0.52–1.04)
EOSINOPHIL # BLD AUTO: 0.1 10E3/UL (ref 0–0.7)
EOSINOPHIL NFR BLD AUTO: 3 %
ERYTHROCYTE [DISTWIDTH] IN BLOOD BY AUTOMATED COUNT: 15 % (ref 10–15)
GFR SERPL CREATININE-BSD FRML MDRD: >90 ML/MIN/1.73M2
GLUCOSE BLD-MCNC: 75 MG/DL (ref 70–99)
HCT VFR BLD AUTO: 39.6 % (ref 35–47)
HGB BLD-MCNC: 12.8 G/DL (ref 11.7–15.7)
IMM GRANULOCYTES # BLD: 0 10E3/UL
IMM GRANULOCYTES NFR BLD: 1 %
LYMPHOCYTES # BLD AUTO: 0.5 10E3/UL (ref 0.8–5.3)
LYMPHOCYTES NFR BLD AUTO: 13 %
MAGNESIUM SERPL-MCNC: 2.3 MG/DL (ref 1.6–2.3)
MCH RBC QN AUTO: 28.7 PG (ref 26.5–33)
MCHC RBC AUTO-ENTMCNC: 32.3 G/DL (ref 31.5–36.5)
MCV RBC AUTO: 89 FL (ref 78–100)
MONOCYTES # BLD AUTO: 0.6 10E3/UL (ref 0–1.3)
MONOCYTES NFR BLD AUTO: 14 %
NEUTROPHILS # BLD AUTO: 2.8 10E3/UL (ref 1.6–8.3)
NEUTROPHILS NFR BLD AUTO: 68 %
NRBC # BLD AUTO: 0 10E3/UL
NRBC BLD AUTO-RTO: 0 /100
PHOSPHATE SERPL-MCNC: 4.1 MG/DL (ref 2.5–4.5)
PLATELET # BLD AUTO: 185 10E3/UL (ref 150–450)
POTASSIUM BLD-SCNC: 4.1 MMOL/L (ref 3.4–5.3)
PROT SERPL-MCNC: 6.9 G/DL (ref 6.8–8.8)
RBC # BLD AUTO: 4.46 10E6/UL (ref 3.8–5.2)
SODIUM SERPL-SCNC: 139 MMOL/L (ref 133–144)
WBC # BLD AUTO: 4.1 10E3/UL (ref 4–11)

## 2021-12-07 PROCEDURE — 86300 IMMUNOASSAY TUMOR CA 15-3: CPT | Performed by: INTERNAL MEDICINE

## 2021-12-07 PROCEDURE — 96413 CHEMO IV INFUSION 1 HR: CPT | Performed by: INTERNAL MEDICINE

## 2021-12-07 PROCEDURE — 99207 PR NO CHARGE LOS: CPT

## 2021-12-07 PROCEDURE — 96375 TX/PRO/DX INJ NEW DRUG ADDON: CPT | Performed by: INTERNAL MEDICINE

## 2021-12-07 PROCEDURE — 85025 COMPLETE CBC W/AUTO DIFF WBC: CPT | Performed by: INTERNAL MEDICINE

## 2021-12-07 PROCEDURE — 83735 ASSAY OF MAGNESIUM: CPT | Performed by: INTERNAL MEDICINE

## 2021-12-07 PROCEDURE — 84100 ASSAY OF PHOSPHORUS: CPT | Performed by: INTERNAL MEDICINE

## 2021-12-07 PROCEDURE — 80053 COMPREHEN METABOLIC PANEL: CPT | Performed by: INTERNAL MEDICINE

## 2021-12-07 PROCEDURE — 36593 DECLOT VASCULAR DEVICE: CPT

## 2021-12-07 PROCEDURE — S0028 INJECTION, FAMOTIDINE, 20 MG: HCPCS | Performed by: INTERNAL MEDICINE

## 2021-12-07 PROCEDURE — 96367 TX/PROPH/DG ADDL SEQ IV INF: CPT | Performed by: INTERNAL MEDICINE

## 2021-12-07 PROCEDURE — 99215 OFFICE O/P EST HI 40 MIN: CPT | Mod: 25 | Performed by: INTERNAL MEDICINE

## 2021-12-07 RX ORDER — NALOXONE HYDROCHLORIDE 0.4 MG/ML
.1-.4 INJECTION, SOLUTION INTRAMUSCULAR; INTRAVENOUS; SUBCUTANEOUS
Status: CANCELLED | OUTPATIENT
Start: 2021-12-07

## 2021-12-07 RX ORDER — ACETAMINOPHEN 325 MG/1
650 TABLET ORAL ONCE
Status: CANCELLED | OUTPATIENT
Start: 2021-12-14

## 2021-12-07 RX ORDER — NALOXONE HYDROCHLORIDE 0.4 MG/ML
.1-.4 INJECTION, SOLUTION INTRAMUSCULAR; INTRAVENOUS; SUBCUTANEOUS
Status: CANCELLED | OUTPATIENT
Start: 2021-12-14

## 2021-12-07 RX ORDER — ATROPINE SULFATE 0.4 MG/ML
0.4 AMPUL (ML) INJECTION
Status: CANCELLED | OUTPATIENT
Start: 2021-12-14

## 2021-12-07 RX ORDER — DIPHENHYDRAMINE HYDROCHLORIDE 50 MG/ML
50 INJECTION INTRAMUSCULAR; INTRAVENOUS
Status: CANCELLED
Start: 2021-12-07

## 2021-12-07 RX ORDER — ATROPINE SULFATE 0.4 MG/ML
0.4 AMPUL (ML) INJECTION
Status: CANCELLED | OUTPATIENT
Start: 2021-12-07

## 2021-12-07 RX ORDER — ALBUTEROL SULFATE 90 UG/1
1-2 AEROSOL, METERED RESPIRATORY (INHALATION)
Status: CANCELLED
Start: 2021-12-07

## 2021-12-07 RX ORDER — LORAZEPAM 2 MG/ML
0.5 INJECTION INTRAMUSCULAR EVERY 4 HOURS PRN
Status: CANCELLED
Start: 2021-12-07

## 2021-12-07 RX ORDER — DIPHENHYDRAMINE HCL 25 MG
50 CAPSULE ORAL ONCE
Status: CANCELLED | OUTPATIENT
Start: 2021-12-07

## 2021-12-07 RX ORDER — MEPERIDINE HYDROCHLORIDE 25 MG/ML
25 INJECTION INTRAMUSCULAR; INTRAVENOUS; SUBCUTANEOUS EVERY 30 MIN PRN
Status: CANCELLED | OUTPATIENT
Start: 2021-12-07

## 2021-12-07 RX ORDER — HEPARIN SODIUM (PORCINE) LOCK FLUSH IV SOLN 100 UNIT/ML 100 UNIT/ML
5 SOLUTION INTRAVENOUS
Status: CANCELLED | OUTPATIENT
Start: 2021-12-14

## 2021-12-07 RX ORDER — METHYLPREDNISOLONE SODIUM SUCCINATE 125 MG/2ML
125 INJECTION, POWDER, LYOPHILIZED, FOR SOLUTION INTRAMUSCULAR; INTRAVENOUS
Status: CANCELLED
Start: 2021-12-14

## 2021-12-07 RX ORDER — ACETAMINOPHEN 325 MG/1
650 TABLET ORAL ONCE
Status: COMPLETED | OUTPATIENT
Start: 2021-12-07 | End: 2021-12-07

## 2021-12-07 RX ORDER — ACETAMINOPHEN 325 MG/1
650 TABLET ORAL ONCE
Status: CANCELLED | OUTPATIENT
Start: 2021-12-07

## 2021-12-07 RX ORDER — MEPERIDINE HYDROCHLORIDE 25 MG/ML
25 INJECTION INTRAMUSCULAR; INTRAVENOUS; SUBCUTANEOUS EVERY 30 MIN PRN
Status: CANCELLED | OUTPATIENT
Start: 2021-12-14

## 2021-12-07 RX ORDER — SODIUM CHLORIDE 9 MG/ML
1000 INJECTION, SOLUTION INTRAVENOUS CONTINUOUS PRN
Status: CANCELLED
Start: 2021-12-14

## 2021-12-07 RX ORDER — HEPARIN SODIUM (PORCINE) LOCK FLUSH IV SOLN 100 UNIT/ML 100 UNIT/ML
5 SOLUTION INTRAVENOUS
Status: CANCELLED | OUTPATIENT
Start: 2021-12-07

## 2021-12-07 RX ORDER — ALBUTEROL SULFATE 0.83 MG/ML
2.5 SOLUTION RESPIRATORY (INHALATION)
Status: CANCELLED | OUTPATIENT
Start: 2021-12-14

## 2021-12-07 RX ORDER — ONDANSETRON 2 MG/ML
8 INJECTION INTRAMUSCULAR; INTRAVENOUS EVERY 6 HOURS PRN
Status: DISCONTINUED | OUTPATIENT
Start: 2021-12-07 | End: 2021-12-07 | Stop reason: HOSPADM

## 2021-12-07 RX ORDER — HEPARIN SODIUM,PORCINE 10 UNIT/ML
5 VIAL (ML) INTRAVENOUS
Status: CANCELLED | OUTPATIENT
Start: 2021-12-07

## 2021-12-07 RX ORDER — HEPARIN SODIUM (PORCINE) LOCK FLUSH IV SOLN 100 UNIT/ML 100 UNIT/ML
5 SOLUTION INTRAVENOUS
Status: DISCONTINUED | OUTPATIENT
Start: 2021-12-07 | End: 2021-12-07 | Stop reason: HOSPADM

## 2021-12-07 RX ORDER — ALBUTEROL SULFATE 90 UG/1
1-2 AEROSOL, METERED RESPIRATORY (INHALATION)
Status: CANCELLED
Start: 2021-12-14

## 2021-12-07 RX ORDER — EPINEPHRINE 1 MG/ML
0.3 INJECTION, SOLUTION INTRAMUSCULAR; SUBCUTANEOUS EVERY 5 MIN PRN
Status: CANCELLED | OUTPATIENT
Start: 2021-12-14

## 2021-12-07 RX ORDER — METHYLPREDNISOLONE SODIUM SUCCINATE 125 MG/2ML
125 INJECTION, POWDER, LYOPHILIZED, FOR SOLUTION INTRAMUSCULAR; INTRAVENOUS
Status: CANCELLED
Start: 2021-12-07

## 2021-12-07 RX ORDER — DIPHENHYDRAMINE HCL 25 MG
50 CAPSULE ORAL ONCE
Status: COMPLETED | OUTPATIENT
Start: 2021-12-07 | End: 2021-12-07

## 2021-12-07 RX ORDER — HEPARIN SODIUM,PORCINE 10 UNIT/ML
5 VIAL (ML) INTRAVENOUS
Status: CANCELLED | OUTPATIENT
Start: 2021-12-14

## 2021-12-07 RX ORDER — LORAZEPAM 2 MG/ML
0.5 INJECTION INTRAMUSCULAR EVERY 4 HOURS PRN
Status: CANCELLED
Start: 2021-12-14

## 2021-12-07 RX ORDER — EPINEPHRINE 1 MG/ML
0.3 INJECTION, SOLUTION INTRAMUSCULAR; SUBCUTANEOUS EVERY 5 MIN PRN
Status: CANCELLED | OUTPATIENT
Start: 2021-12-07

## 2021-12-07 RX ORDER — DIPHENHYDRAMINE HYDROCHLORIDE 50 MG/ML
50 INJECTION INTRAMUSCULAR; INTRAVENOUS
Status: CANCELLED
Start: 2021-12-14

## 2021-12-07 RX ORDER — ALBUTEROL SULFATE 0.83 MG/ML
2.5 SOLUTION RESPIRATORY (INHALATION)
Status: CANCELLED | OUTPATIENT
Start: 2021-12-07

## 2021-12-07 RX ORDER — DIPHENHYDRAMINE HCL 25 MG
50 CAPSULE ORAL ONCE
Status: CANCELLED | OUTPATIENT
Start: 2021-12-14

## 2021-12-07 RX ORDER — SODIUM CHLORIDE 9 MG/ML
1000 INJECTION, SOLUTION INTRAVENOUS CONTINUOUS PRN
Status: CANCELLED
Start: 2021-12-07

## 2021-12-07 RX ADMIN — Medication 2 MG: at 09:42

## 2021-12-07 RX ADMIN — ONDANSETRON 8 MG: 2 INJECTION INTRAMUSCULAR; INTRAVENOUS at 12:24

## 2021-12-07 RX ADMIN — Medication 50 MG: at 11:27

## 2021-12-07 RX ADMIN — Medication 250 ML: at 11:18

## 2021-12-07 RX ADMIN — ACETAMINOPHEN 650 MG: 325 TABLET ORAL at 11:26

## 2021-12-07 ASSESSMENT — PAIN SCALES - GENERAL: PAINLEVEL: NO PAIN (0)

## 2021-12-07 NOTE — PROGRESS NOTES
Port accessed using 20 G 3/4 inch needle.  No blood return noted, TPA administered.  Lab staff damien patient's labs.  Pt tolerated procedure.  Port left accessed for infusion.    Joanne Ludwig RN-BSN, PHN, OCN  [facility]

## 2021-12-07 NOTE — PROGRESS NOTES
ONCOLOGY FOLLOW UP NOTE: 12/07/21        I took the history from reviewing the previous notes that she was following with Dr. Amaya.  I have copied and updated from prior notes.    ONCOLOGY History:    1.  January 2006:  Diagnosed with Stage IIB, T2 N1 M0 invasive lobular carcinoma of the right breast.  Final pathology showed a 4.5 x 3.8 x 2.5 cm, 01/14 lymph nodes positive.  Estrogen, progesterone receptor positive, HER-2 negative.     2.  Genetics.  BRCA1 and 2 mutations not detected. 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       THERAPY TO DATE:   1.  2006:  ECOG 2104 protocol of dose-dense Adriamycin, Cytoxan and Avastin x4 cycles followed by Taxol and Avastin x4 cycles.   2.  03/2007:  Completed 1 year Avastin.   3.  11/2006-01/2007:  Radiotherapy to the right breast of 5040 cGy.   4.  03/[id number]:  Aromasin.  Stopped after moving to the [facility].   5.  01/2016:  Right modified radical mastectomy with latissimus dorsi flap reconstruction and a left prophylactic mastectomy with latissimus dorsi flap reconstruction.     She recently had MRI of the brain as a follow-up for multiple sclerosis and there were multiple calvarial lesions noted which was suspicious for metastatic disease.  There was no evidence of new multiple sclerosis lesions.  She had a PET scan on 8/30/2019 which showed widespread bone metastatic lesions (calvarium, spine, sacrum, pelvis, ribs most prominent at C7, T3, L1 and L4), hypermetabolic 3 cm lesion in LLL, and mediastinal/hilar LN. CEA wnl at 1.9 and C27-29 elevated to 415 (28 on 8/23/16). A CT guided biopsy of the right iliac bone was obtained on 9/4/19, pathology consistent with metastatic adenocarcinoma (breast), ER/W[place] negative, HER-2 negative PDL 1 < 1%. A second biopsy was take of the LLL nodule via EBUS on 9/5/19 did not show any malignancy.    C/T/L spine MRI 8/3/19 to 9/2/19 with numerous enhancing lesions of the C, T and L spine, largest around T9 and L1. No evidence  of cord compression. Has a L1 compression fracture and impending L4.     Received radiation T12-L5 3000 cGy in 10 fractions from 9/6/2019 till 9/18/2019.  Neurosurgery recommended no surgical intervention but to wear Gorge brace when out of bed and HOB >30 degrees    She started palliative Xeloda 2000/1500 on 10/1/2019 but after just a few doses she noticed nausea, red eyes blurred vision, loss of appetite.  She stopped taking it after 5 doses and was seen by nurse practitioner on 10/7/2019 when she was improving.  At that point she was restarted on Xeloda at a lower dose of 1000 mg twice a day.  As she was not able to tolerate even the lower dose with extreme nausea and vomiting and feeling extremely fatigued and blurry vision, we decided on stopping Xeloda.    We decided on repeating scans and MRI brain on 10/25/2019 showed no intracranial metastatic disease.   Multiple enhancing calvarial lesions may be increased in number and are suggestive of metastatic disease. Thinner imaging on the current study may identify the smaller lesions and may be responsible for  identified more lesions.   There is a single focus of T2 hyperintense signal within the anterior right temporal lobe may represent interval demyelination. There is no evidence for active demyelination.    PET/CT on 11/1/2019 showed favorable response to therapy and Overall FDG uptake within scattered osseous metastases is decreased since 8/30/2019, particularly within the pelvis. Increased sclerotic appearance of L1 and L4 pathologic compression deformities, likely sequela of recently completed palliative radiation therapy.    No significant FDG uptake in previously demonstrated hypermetabolic mediastinal lymph nodes. Biopsy negative on 9/5/2019.  There is also decreased FDG uptake in the left lower lobe (biopsy negative for  malignancy), now with dense consolidation containing air bronchograms suggestive of infection versus aspiration.  There is diffuse  FDG uptake within the esophagus, consistent with esophagitis.    Because of intolerance to Xeloda we decided on switching to weekly paclitaxel on 11/5/2019.    C#2 Taxol 12/4/19- started with 70mg/m2 dose reduction    C#3 Taxol 12/30/2019     PET/CT on 1/24/2020 showed progression of the disease in some of the bone lesions including increase in size and lytic lesion within the vertebral body of C4 measuring 1 cm as opposed to 0.6 cm previously and a new central soft tissue nodule with SUV max 4.1 when previously it was non-hypermetabolic.  There is also some progression noted in the right proximal femur and right iliac bone.  There is decreased metabolic uptake in the right lamina of T9 with SUV max 4.7 when previously it was 8.0.  Other lesions are stable.    CA 27-29 also had increased significantly and it was 257 on 1/28/2020.    We decided on switching to eribulin on 1/28/2020.    C#2 2/18/2020  C#2 D#8 2/25/2020    C#3 D#1 3/18/2020 -delayed by 1 week as per patient's preference because she wanted to visit her family.    She had a repeat PET/CT on 3/27/2020 which showed stable size of the bone metastatic lesions although the FDG avidity was less, consistent with treatment response.    She had MRI of the thoracic spine on 4/3/2020 and it was compared to the one which was done in August 2019 and it showed increased size of several metastatic lesions most notably at T9 but also at T5-T7.  Other lesions at T10, T11 and T12 appeared improved.    CA 27-29 was also down to 222 on 3/18/2020.    Cycle #4 eribulin ( dose reduced to 1.2 mg/m2 )-4/7/2020-   Cycle #5 Eribulin ( 1.2 mg/m2 )- 4/28/2020   Cycle #6 Eribulin ( 1.2 mg/m2 )- 5/19/2020     Cycle #7 Eribulin ( 1.2 mg/m2 )- 6/9/2020    Cycle #8 Eribulin ( 1.2 mg/m2 )- 6/30/2020     She had a repeat PET scan on 7/17/2020 which showed incidental finding of new acute bilateral pulmonary embolism in the distal left main pulmonary artery extending in the left upper and  lower lobes and in the segmental and branch arteries in the right lower lobe.    It also showed mildly increased FDG avidity in the lytic lesion in the T9 lamina and right pedicle with SUV max 5.3, previously 3.9.  That is also slightly increased FDG uptake to the left anterior fifth rib at the costochondral junction SUV max 3.9, previously 2.5.  There is slightly decreased FDG uptake in the right femoral neck lesion SUV max 2.2, previously 2.9.  FDG uptake in other bone lesions is fairly stable.  No new metastasis are seen.    CA 27-29 was 308 on 6/30/2020.  It was 286 on 5/19/2020.    Overall this is consistent with only slight progression versus stable disease.    She was started on Lovenox.    Cycle #9 eribulin 7/21/2020. CA 27-29 was 238    C#10 eribulin 8/18/2020. ( delayed by one week for ANC 0.9 )    C#11 Eribulin 9/8/2020    C#12 Eribulin 9/29/2020     C#13 Eribulin 10/20/2020     PET scan on 11/6/2020 shows progression of the disease with increased FDG uptake in the lytic lesions in the T9/T10 and right posterolateral elements as well as increased FDG uptake in the previously known hypermetabolic right iliac bone lesion suggesting recurrence of previously treated metastasis.  There is new subtle lesion in the right manubrium.  There is also a new fracture in the anterolateral left fourth rib with associated uptake and this could be a pathologic fracture.  Healing fracture in the left anterior fifth rib lesion.  There is resolution of the left pulmonary emboli.  Decreased FDG uptake of the esophagus consistent with improving inflammation.    CA 27-29 on 10/20/2020 was also elevated at 489.    C#1 Trodelvy on 11/11/2020.  Cycle #2 Trodelvy 12/2/2020  Cycle number 2-day #8 Trodelvy 12/8/2020.    12/15/2020-12/16/2020.  She was admitted to the hospital with nausea vomiting and dehydration.  She had tachycardia and initial lactic acid of 5.  It decreased to 1.4 after 2 L of normal saline.  She also had  hypokalemia and ANC was 1.3.  She was treated with IV fluids.  Procalcitonin was negative.  CTA of the chest was negative for pulmonary embolism or pneumonia.  No bacterial infection was documented.  Her medication which she was initially unable to tolerate at home, were restarted and she was discharged home once started to feel better.      We delayed the start of cycle number 3 x 1 week and decrease the dose of chemotherapy by 25%.  She also had neutropenia with ANC of 1.0.      She started cycle number 3-day #1 on 12/29/2020.  CA 27-29 was 392.    Cycle number 3-day #8 1/5/2021.    C#4 Trodelvy 1/20/2021- 75% dose    2/5/2021.  PET/CT overall shows a good response to treatment with improvement of previously hypermetabolic lesions in the spine and pelvis and stability of other bone meta stasis.  There is a single lytic lesion in the right iliac bone which demonstrates slight Yimi increased FDG uptake and has SUV max 4.7 while previously it was SUV max 3.4.  There was diffuse wall thickening of the esophagus with uptake in the esophagus in the fundus of the stomach and this could be seen with inflammation.    Trodelvy cycle #5 - 2/10/2021.  75% of the dose.  CA 27-29 was 337.    Trodelvy cycle #6 - 3/3/2021.  75% of the dose.  Trodelvy cycle #6, D#8 - 3/10/2021.  75% of the dose.    Trodelvy C#8, D#8 on 4/21/2021  CA 27-29 stable at 371 on 4/14/2021    Trodelvy, cycle #9- 5/5/2021        On 2/25/2020 when she had biopsy of the right acetabulum and it was consistent with metastatic lobular carcinoma.  Again it was Triple Negative.     On 3/18/2020 when she also met with Dr. Arelis Arshad who also advised testing for androgen receptor studies on the biopsy specimen.  Tumor was diffusely androgen receptor positive.      5/26/2021-cycle #10 Trodelvy started    CA 27-29 was 545.    6/11/2021.  PET/CT shows mildly increased uptake in several bone lesions for example in the left anterior iliac crest, mid right iliac crest and  left ischium.  Uptake in other bone lesions are similar to previously.    Because of minimal progression of the disease and she is tolerating chemotherapy very well, we decided on continuing the same chemotherapy.    6/16/2021-Trodelvy cycle #11    7/7/2021 -Trodelvy cycle #12    9/14/2021-Trodelvy-cycle #15, day #8.    9/8/2021-CA 27-29 was more elevated and it was 1176.    PET/CT on 9/24/2021 showed stable findings with no evidence of FDG avid metastatic disease within the body.  Left axillary lymph nodes are prominent and hypermetabolic and likely from recent vaccinations in the left deltoid muscle.  Healed bony metastasis seems stable.      Cycle #16, Trodelvy 9/28/2021.  Cycle #17, day #8 Trodelvy was on 10/26/2021.  Cycle #18, day #8 Trodelvy on 11/22/2021       She saw Dr. Mejia whom on 10/6/2021.  She was not considered eligible for Enfortumab trial.    Cycle #19, Trodelvy on 12/7/2021      Interval history.  She is here in the clinic.  Chemotherapy is going well.  Diarrhea did not recur.  She did not have any nausea or vomiting.  She has noticed more pain in the lower back and she continues to take morphine twice a day.  She is going to discuss with Dr. Whitaker in the coming days about it.  She takes occasional ibuprofen.  No new pain.  No new swellings.  Neuropathy is mild and stable.  Energy is decent.  No weight loss.    ECOG 1    ROS:  Rest of the comprehensive review of the system was negative.        I reviewed other history in epic as below.       PAST MEDICAL HISTORY:     1.  Breast cancer  2.  Multiple sclerosis.   3.  Depression.   4.  Migraines.   5.  Hypertension.   6.  Cholecystectomy and umbilical hernia repair.     SOCIAL HISTORY: She smoked for 5 years but quit many years ago.  Drinks alcohol socially.  She lives with her .  She is a teacher in middle school.       FAMILY HISTORY: She mentions that her mother had breast cancer at 49.  Both grandmothers had breast cancer but she is  not sure of the age.  A couple of cousins have cancers.  Patient has 3 children who are healthy.    Current Outpatient Medications   Medication     acetaminophen (TYLENOL) 500 MG tablet     albuterol (PROAIR HFA/PROVENTIL HFA/VENTOLIN HFA) 108 (90 Base) MCG/ACT inhaler     busPIRone (BUSPAR) 15 MG tablet     calcium citrate-vitamin D (CITRACAL) 315-250 MG-UNIT TABS     Cholecalciferol (VITAMIN D3 PO)     ELIQUIS ANTICOAGULANT 5 MG tablet     HEMP OIL OR EXTRACT OR OTHER CBD CANNABINOID, NOT MEDICAL CANNABIS,     ibuprofen (ADVIL/MOTRIN) 600 MG tablet     loratadine (CLARITIN) 10 MG tablet     LORazepam (ATIVAN) 1 MG tablet     magnesium 250 MG tablet     morphine (MS CONTIN) 15 MG CR tablet     morphine (MSIR) 15 MG IR tablet     Nerve Stimulator (NERIVIO) CRISTOPHER     OLANZapine (ZYPREXA) 5 MG tablet     potassium chloride ER (KLOR-CON M) 10 MEQ CR tablet     promethazine (PHENERGAN) 25 MG tablet     propranolol ER (INDERAL LA) 80 MG 24 hr capsule     senna-docusate (SENOKOT-S/PERICOLACE) 8.6-50 MG tablet     traZODone (DESYREL) 50 MG tablet     venlafaxine (EFFEXOR-XR) 75 MG 24 hr capsule     No current facility-administered medications for this visit.        Allergies   Allergen Reactions     Bactrim [Sulfamethoxazole W/Trimethoprim]      Internal bleeding     Copaxone [Glatiramer] Hives          PHYSICAL EXAMINATION:     There were no vitals taken for this visit.  Wt Readings from Last 4 Encounters:   11/22/21 65.5 kg (144 lb 6.4 oz)   11/16/21 64.9 kg (143 lb)   10/26/21 62.1 kg (137 lb)   10/19/21 63.4 kg (139 lb 11.2 oz)       CONSTITUTIONAL: no acute distress  EYES: PERRLA, no palor or icterus.   ENT/MOUTH: no mouth lesions. Ears normal  CVS: s1s2 no m r g .   RESPIRATORY: clear to auscultation b/l  GI: soft non tender no hepatosplenomegaly  NEURO: AAOX3  Grossly non focal neuro exam  INTEGUMENT: no obvious rashes  LYMPHATIC: no palpable LAD  MUSCULOSKELETAL: Unremarkable. No bony tenderness.   EXTREMITIES: no  edema  PSYCH: Mentation, mood and affect are normal. Decision making capacity is intact      Labs and Imaging.    Reviewed.    CBC unremarkable.    Other labs are pending from today.    11/22/2021.  CBC unremarkable  CMP unremarkable.    CA 27-29 was 940 on 11/16/2021.  CA 27-29 was 1176 on 9/8/2021.  It was 1023 on 10/19/2021.    9/28/2021.      Iron studies, ferritin is 101, iron 51, TIBC 268 and iron saturation index 19%.    CA 27-29 was 465 on 6/16/2021.  It was 545 on 5/26/2021      PET/CT on 9/24/2021 showed stable findings with no evidence of FDG avid metastatic disease within the body.  Left axillary lymph nodes are prominent and hypermetabolic and likely from recent vaccinations in the left deltoid muscle.  Healed bony metastasis seems stable.      Biopsy from the right acetabulum shows metastatic lobular carcinoma.  It is triple negative.  Androgen receptor was diffusely positive (90%, moderate intensity) by immunohistochemistry. )  PD-L1 negative.    Foundation one showed CDH1 mutation (initially lobular cancer), CCND1 amplification ( equivocal ). FGF3, FGF4, FGF19 amplification ( Equivocal ). Most promising is CCND1 amplification with abemaciclib showing response in 4/10 patients. FGF3,FGF4 and FGF19 are targetable with pan-FGFR inhibitors.           ASSESSMENT AND PLAN:     Now she has metastatic breast cancer negative breast cancer (androgen receptor positive ) with extensive involvement of the bones.  There is also a left lower lobe hypermetabolic lesion and mediastinal lymph nodes which are hypermetabolic.  The biopsy from the right iliac bone is consistent with metastatic lobular breast cancer but it is hormone receptor and HER-2 negative and PDL 1 is also negative.    Foundation 1 testing did not reveal any actionable mutations.    She was intolerant to Xeloda and progressed on Taxol and eribulin.      We then switched to recently FDA approved sacituzumab govitecan-hziy (Trodelvy) for TNBC, based  on the results of the phase II IMMU-132-01 clinical trial.   She started this on 11/11/2020.      We delayed the start of cycle number 3 x 1 week and decrease the dose of chemotherapy by 25% and she also has neutropenia with ANC of 1.  She started cycle number 3-day #1 on 12/29/2020.      After 4 cycles of chemotherapy, overall the PET scan shows positive response to treatment apart from mildly increased FDG avidity in one of the right iliac bone lesions.  CA 27-29 was 454 slightly elevated from before.    We decided on continuing the same chemotherapy.      She obtained a second opinion from Dr. Castillo from [facility] who recommended a repeat biopsy to be done to make sure that she really has triple negative breast cancer.      She also met with Dr. Arelis Arshad on 3/18/2021.      After 10 cycles of Trodelvy, she had PET/CT on 6/11/2021 which showed mildly increased uptake in some of the bone lesions while a stability in other bone lesions.      We discussed the situation in detail.      Because of minimal progression of the disease plus the fact that she had been tolerating treatments well, we decided on continuing the same chemotherapy.     After 15 cycles, repeat PET scan showed stable findings.  CA 27-29 has been increasing and it is 1176.      As she was tolerating the chemotherapy well and her quality of life has been decent and scans were stable although she had a rising CA 27-29, we decided on continuing the same chemotherapy because it has been a very slow progression of the disease.    Foundation one showed CDH1 mutation (initially lobular cancer), CCND1 amplification ( equivocal ). FGF3, FGF4, FGF19 amplification ( Equivocal ). Most promising is CCND1 amplification with abemaciclib showing response in 4/10 patients. FGF3,FGF4 and FGF19 are targetable with pan-FGFR inhibitor    As the tumor is diffusely positive for androgen receptor, we will consider giving her androgen receptor blockers like  Casodex 150 mg daily.    We can consider Doxil every 4 weeks.     She met with Dr. Mejia to consider clinical trial but at this time she is not eligible for any.    She has completed 18 cycles of Trodelvy.      She is doing well and chemotherapy is being tolerated nicely.  Continue with cycle #19 today provided the labs are stable.          Diarrhea.  This has not recurred.  She can take Imodium as needed.     Bone disease.  She has metastatic disease to the bone.  Previously she got palliative radiation to T12-L5 3000 cGy in 10 fractions from 9/6/2019 till 9/18/2019.  She was evaluated by orthopedics Dr. Bipin Elliott on 11/1/2019 and conservative management was recommended.    She was on Zometa every 3 months. She last received on 9/29/2020.  Because of progression of the disease in the bones, we switched to Xgeva which she received on 11/11/2020.  PET scan is stable.  Bone lesions show treated meta stasis.  Continue Xgeva every month.  She last received on 11/22/2021.  Continue vitamin D and calcium.    Pain management.  She has noticed more pain.  For now continue MS Contin and morphine sulfate twice daily and discuss with palliative care.      Bilateral pulmonary emboli.  Eliquis is going well.  PE was found incidentally.  Continue to take Eliquis.      Neuropathy.  This remains mild and stable with neuropathy in her hands.    This is in addition to the chronic balance issues and heat and cold sensitivity from underlying multiple sclerosis which is also stable for years.  Continue to monitor.     We did not address the following today.  Subcutaneous nodule in the scalp.  This looks like an epidermoid cyst.  She thinks it is a stable.  Continue to monitor.       Insomnia.  Continue trazodone.      Wall thickening of esophagus and FDG uptake of fundus of the stomach.  It could be in the setting of inflammation from recent nausea and vomiting.  Symptoms have resolved.  Continue to monitor  clinically.    Vision changes.    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  It was thought that this could be due to Taxol as patient reported to the ophthalmologist that she was on Taxol in September 2019 when her symptoms started.  But she was not on Taxol in September 2019 when she started noticing the visual changes so Taxol is not responsible for this.  The first dose of Taxol was on 11/5/2019.   She had left cataract extraction on 2/8/2021 and she has noticed significant improvement in her vision.  She plans to do cataract extraction of the right eye in couple of weeks.      Increased FDG ability in the colon.  She had FDG uptake in the cecum and ascending colon but no corresponding lesion was seen on the CT scan.  She is completely asymptomatic so at this time we will continue to observe.    Discussion regarding healthcare directives.  On previous visit she told me that she will bring the health care directive for our records but she forgot so I told her to bring it on next visit.      Breast implant removal.  She tells me that she was planning to do breast implant removal because there was a recall on this.  Her surgery was scheduled for 9/24/2019.  Because of this new development of metastatic breast cancer and her recent radiation, surgery has been canceled.  We discussed that at this time treatment for metastatic breast cancer would take precedence over the other surgery as the other surgery would require her to be off chemotherapy for several weeks and in that case the chances of cancer progression would be high and I would not recommend that.    Multiple sclerosis.  She will continue to follow with her neurologist Dr. Quiles.  Currently multiple sclerosis is under control and currently she is not taking any medications.      Return to clinic as scheduled      All of her questions were answered to her satisfaction.  She is agreeable and comfortable with the  plan.    Dewayne Zepeda MD

## 2021-12-07 NOTE — PROGRESS NOTES
Infusion Nursing Note:  Shaneka Alvarez presents today for C19D1 Dennyvey.    Patient seen by provider today: Yes: Dr Zepeda   present during visit today: Not Applicable.    Note: N/A.      Intravenous Access:  Implanted Port.    Treatment Conditions:  Lab Results   Component Value Date    HGB 12.8 12/07/2021    WBC 4.1 12/07/2021    ANEU 1.9 10/26/2021    ANEUTAUTO 2.8 12/07/2021     12/07/2021      Lab Results   Component Value Date     12/07/2021    POTASSIUM 4.1 12/07/2021    MAG 2.3 12/07/2021    CR 0.72 12/07/2021    RAFAELA 9.4 12/07/2021    BILITOTAL 0.3 12/07/2021    ALBUMIN 3.6 12/07/2021    ALT 27 12/07/2021    AST 19 12/07/2021     Results reviewed, labs MET treatment parameters, ok to proceed with treatment.      Post Infusion Assessment:  Patient tolerated infusion without incident.  Patient observed for 30 minutes post infusion per protocol.       Discharge Plan:   [facility]  Patient will return 12/14/2021 for next appointment.   Patient discharged in stable condition accompanied by: amita.      Bobbi Contreras RN

## 2021-12-07 NOTE — LETTER
12/7/2021         RE: Shaneka Alvarez  [id number] [facility] [address]        Dear Colleague,    Thank you for referring your patient, Shaneka Alvarez, to the [facility]. Please see a copy of my visit note below.    ONCOLOGY FOLLOW UP NOTE: 12/07/21        I took the history from reviewing the previous notes that she was following with Dr. Amaya.  I have copied and updated from prior notes.    ONCOLOGY History:    1.  January 2006:  Diagnosed with Stage IIB, T2 N1 M0 invasive lobular carcinoma of the right breast.  Final pathology showed a 4.5 x 3.8 x 2.5 cm, 01/14 lymph nodes positive.  Estrogen, progesterone receptor positive, HER-2 negative.     2.  Genetics.  BRCA1 and 2 mutations not detected. 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       THERAPY TO DATE:   1. 2006:  ECOG 2104 protocol of dose-dense Adriamycin, Cytoxan and Avastin x4 cycles followed by Taxol and Avastin x4 cycles.   2.  03/2007:  Completed 1 year Avastin.   3.  11/2006-01/2007:  Radiotherapy to the right breast of 5040 cGy.   4.  03/[id number]:  Aromasin.  Stopped after moving to the [facility].   5.  01/2016:  Right modified radical mastectomy with latissimus dorsi flap reconstruction and a left prophylactic mastectomy with latissimus dorsi flap reconstruction.     She recently had MRI of the brain as a follow-up for multiple sclerosis and there were multiple calvarial lesions noted which was suspicious for metastatic disease.  There was no evidence of new multiple sclerosis lesions.  She had a PET scan on 8/30/2019 which showed widespread bone metastatic lesions (calvarium, spine, sacrum, pelvis, ribs most prominent at C7, T3, L1 and L4), hypermetabolic 3 cm lesion in LLL, and mediastinal/hilar LN. CEA wnl at 1.9 and C27-29 elevated to 415 (28 on 8/23/16). A CT guided biopsy of the right iliac bone was obtained on 9/4/19, pathology consistent with metastatic adenocarcinoma (breast),  ER/MA negative, HER-2 negative PDL 1 < 1%. A second biopsy was take of the LLL nodule via EBUS on 9/5/19 did not show any malignancy.    C/T/L spine MRI 8/3/19 to 9/2/19 with numerous enhancing lesions of the C, T and L spine, largest around T9 and L1. No evidence of cord compression. Has a L1 compression fracture and impending L4.     Received radiation T12-L5 3000 cGy in 10 fractions from 9/6/2019 till 9/18/2019.  Neurosurgery recommended no surgical intervention but to wear [place] brace when out of bed and HOB >30 degrees    She started palliative Xeloda 2000/1500 on 10/1/2019 but after just a few doses she noticed nausea, red eyes blurred vision, loss of appetite.  She stopped taking it after 5 doses and was seen by nurse practitioner on 10/7/2019 when she was improving.  At that point she was restarted on Xeloda at a lower dose of 1000 mg twice a day.  As she was not able to tolerate even the lower dose with extreme nausea and vomiting and feeling extremely fatigued and blurry vision, we decided on stopping Xeloda.    We decided on repeating scans and MRI brain on 10/25/2019 showed no intracranial metastatic disease.   Multiple enhancing calvarial lesions may be increased in number and are suggestive of metastatic disease. Thinner imaging on the current study may identify the smaller lesions and may be responsible for  identified more lesions.   There is a single focus of T2 hyperintense signal within the anterior right temporal lobe may represent interval demyelination. There is no evidence for active demyelination.    PET/CT on 11/1/2019 showed favorable response to therapy and Overall FDG uptake within scattered osseous metastases is decreased since 8/30/2019, particularly within the pelvis. Increased sclerotic appearance of L1 and L4 pathologic compression deformities, likely sequela of recently completed palliative radiation therapy.    No significant FDG uptake in previously demonstrated hypermetabolic  mediastinal lymph nodes. Biopsy negative on 9/5/2019.  There is also decreased FDG uptake in the left lower lobe (biopsy negative for  malignancy), now with dense consolidation containing air bronchograms suggestive of infection versus aspiration.  There is diffuse FDG uptake within the esophagus, consistent with esophagitis.    Because of intolerance to Xeloda we decided on switching to weekly paclitaxel on 11/5/2019.    C#2 Taxol 12/4/19- started with 70mg/m2 dose reduction    C#3 Taxol 12/30/2019     PET/CT on 1/24/2020 showed progression of the disease in some of the bone lesions including increase in size and lytic lesion within the vertebral body of C4 measuring 1 cm as opposed to 0.6 cm previously and a new central soft tissue nodule with SUV max 4.1 when previously it was non-hypermetabolic.  There is also some progression noted in the right proximal femur and right iliac bone.  There is decreased metabolic uptake in the right lamina of T9 with SUV max 4.7 when previously it was 8.0.  Other lesions are stable.    CA 27-29 also had increased significantly and it was 257 on 1/28/2020.    We decided on switching to eribulin on 1/28/2020.    C#2 2/18/2020  C#2 D#8 2/25/2020    C#3 D#1 3/18/2020 -delayed by 1 week as per patient's preference because she wanted to visit her family.    She had a repeat PET/CT on 3/27/2020 which showed stable size of the bone metastatic lesions although the FDG avidity was less, consistent with treatment response.    She had MRI of the thoracic spine on 4/3/2020 and it was compared to the one which was done in August 2019 and it showed increased size of several metastatic lesions most notably at T9 but also at T5-T7.  Other lesions at T10, T11 and T12 appeared improved.    CA 27-29 was also down to 222 on 3/18/2020.    Cycle #4 eribulin ( dose reduced to 1.2 mg/m2 )-4/7/2020-   Cycle #5 Eribulin ( 1.2 mg/m2 )- 4/28/2020   Cycle #6 Eribulin ( 1.2 mg/m2 )- 5/19/2020     Cycle #7  Eribulin ( 1.2 mg/m2 )- 6/9/2020    Cycle #8 Eribulin ( 1.2 mg/m2 )- 6/30/2020     She had a repeat PET scan on 7/17/2020 which showed incidental finding of new acute bilateral pulmonary embolism in the distal left main pulmonary artery extending in the left upper and lower lobes and in the segmental and branch arteries in the right lower lobe.    It also showed mildly increased FDG avidity in the lytic lesion in the T9 lamina and right pedicle with SUV max 5.3, previously 3.9.  That is also slightly increased FDG uptake to the left anterior fifth rib at the costochondral junction SUV max 3.9, previously 2.5.  There is slightly decreased FDG uptake in the right femoral neck lesion SUV max 2.2, previously 2.9.  FDG uptake in other bone lesions is fairly stable.  No new metastasis are seen.    CA 27-29 was 308 on 6/30/2020.  It was 286 on 5/19/2020.    Overall this is consistent with only slight progression versus stable disease.    She was started on Lovenox.    Cycle #9 eribulin 7/21/2020. CA 27-29 was 238    C#10 eribulin 8/18/2020. ( delayed by one week for ANC 0.9 )    C#11 Eribulin 9/8/2020    C#12 Eribulin 9/29/2020     C#13 Eribulin 10/20/2020     PET scan on 11/6/2020 shows progression of the disease with increased FDG uptake in the lytic lesions in the T9/T10 and right posterolateral elements as well as increased FDG uptake in the previously known hypermetabolic right iliac bone lesion suggesting recurrence of previously treated metastasis.  There is new subtle lesion in the right manubrium.  There is also a new fracture in the anterolateral left fourth rib with associated uptake and this could be a pathologic fracture.  Healing fracture in the left anterior fifth rib lesion.  There is resolution of the left pulmonary emboli.  Decreased FDG uptake of the esophagus consistent with improving inflammation.    CA 27-29 on 10/20/2020 was also elevated at 489.    C#1 Trodelvy on 11/11/2020.  Cycle #2 Trodelvy  12/2/2020  Cycle number 2-day #8 Trodelvy 12/8/2020.    12/15/2020-12/16/2020.  She was admitted to the hospital with nausea vomiting and dehydration.  She had tachycardia and initial lactic acid of 5.  It decreased to 1.4 after 2 L of normal saline.  She also had hypokalemia and ANC was 1.3.  She was treated with IV fluids.  Procalcitonin was negative.  CTA of the chest was negative for pulmonary embolism or pneumonia.  No bacterial infection was documented.  Her medication which she was initially unable to tolerate at home, were restarted and she was discharged home once started to feel better.      We delayed the start of cycle number 3 x 1 week and decrease the dose of chemotherapy by 25%.  She also had neutropenia with ANC of 1.0.      She started cycle number 3-day #1 on 12/29/2020.  CA 27-29 was 392.    Cycle number 3-day #8 1/5/2021.    C#4 Trodelvy 1/20/2021- 75% dose    2/5/2021.  PET/CT overall shows a good response to treatment with improvement of previously hypermetabolic lesions in the spine and pelvis and stability of other bone meta stasis.  There is a single lytic lesion in the right iliac bone which demonstrates slight Yimi increased FDG uptake and has SUV max 4.7 while previously it was SUV max 3.4.  There was diffuse wall thickening of the esophagus with uptake in the esophagus in the fundus of the stomach and this could be seen with inflammation.    Trodelvy cycle #5 - 2/10/2021.  75% of the dose.  CA 27-29 was 337.    Trodelvy cycle #6 - 3/3/2021.  75% of the dose.  Trodelvy cycle #6, D#8 - 3/10/2021.  75% of the dose.    Trodelvy C#8, D#8 on 4/21/2021  CA 27-29 stable at 371 on 4/14/2021    Trodelvy, cycle #9- 5/5/2021        On 2/25/2020 when she had biopsy of the right acetabulum and it was consistent with metastatic lobular carcinoma.  Again it was Triple Negative.     On 3/18/2020 when she also met with Dr. Arelis Arshad who also advised testing for androgen receptor studies on the biopsy  specimen.  Tumor was diffusely androgen receptor positive.      5/26/2021-cycle #10 Trodelvy started    CA 27-29 was 545.    6/11/2021.  PET/CT shows mildly increased uptake in several bone lesions for example in the left anterior iliac crest, mid right iliac crest and left ischium.  Uptake in other bone lesions are similar to previously.    Because of minimal progression of the disease and she is tolerating chemotherapy very well, we decided on continuing the same chemotherapy.    6/16/2021-Trodelvy cycle #11    7/7/2021 -Trodelvy cycle #12    9/14/2021-Trodelvy-cycle #15, day #8.    9/8/2021-CA 27-29 was more elevated and it was 1176.    PET/CT on 9/24/2021 showed stable findings with no evidence of FDG avid metastatic disease within the body.  Left axillary lymph nodes are prominent and hypermetabolic and likely from recent vaccinations in the left deltoid muscle.  Healed bony metastasis seems stable.      Cycle #16, Trodelvy 9/28/2021.  Cycle #17, day #8 Trodelvy was on 10/26/2021.  Cycle #18, day #8 Trodelvy on 11/22/2021       She saw Dr. Mejia whom on 10/6/2021.  She was not considered eligible for Enfortumab trial.    Cycle #19, Trodelvy on 12/7/2021      Interval history.  She is here in the clinic.  Chemotherapy is going well.  Diarrhea did not recur.  She did not have any nausea or vomiting.  She has noticed more pain in the lower back and she continues to take morphine twice a day.  She is going to discuss with Dr. Whitaker in the coming days about it.  She takes occasional ibuprofen.  No new pain.  No new swellings.  Neuropathy is mild and stable.  Energy is decent.  No weight loss.    ECOG 1    ROS:  Rest of the comprehensive review of the system was negative.        I reviewed other history in epic as below.       PAST MEDICAL HISTORY:     1.  Breast cancer  2.  Multiple sclerosis.   3.  Depression.   4.  Migraines.   5.  Hypertension.   6.  Cholecystectomy and umbilical hernia repair.     SOCIAL  HISTORY: She smoked for 5 years but quit many years ago.  Drinks alcohol socially.  She lives with her .  She is a teacher in middle school.       FAMILY HISTORY: She mentions that her mother had breast cancer at 49.  Both grandmothers had breast cancer but she is not sure of the age.  A couple of cousins have cancers.  Patient has 3 children who are healthy.    Current Outpatient Medications   Medication     acetaminophen (TYLENOL) 500 MG tablet     albuterol (PROAIR HFA/PROVENTIL HFA/VENTOLIN HFA) 108 (90 Base) MCG/ACT inhaler     busPIRone (BUSPAR) 15 MG tablet     calcium citrate-vitamin D (CITRACAL) 315-250 MG-UNIT TABS     Cholecalciferol (VITAMIN D3 PO)     ELIQUIS ANTICOAGULANT 5 MG tablet     HEMP OIL OR EXTRACT OR OTHER CBD CANNABINOID, NOT MEDICAL CANNABIS,     ibuprofen (ADVIL/MOTRIN) 600 MG tablet     loratadine (CLARITIN) 10 MG tablet     LORazepam (ATIVAN) 1 MG tablet     magnesium 250 MG tablet     morphine (MS CONTIN) 15 MG CR tablet     morphine (MSIR) 15 MG IR tablet     Nerve Stimulator (NERIVIO) CRISTOPHER     OLANZapine (ZYPREXA) 5 MG tablet     potassium chloride ER (KLOR-CON M) 10 MEQ CR tablet     promethazine (PHENERGAN) 25 MG tablet     propranolol ER (INDERAL LA) 80 MG 24 hr capsule     senna-docusate (SENOKOT-S/PERICOLACE) 8.6-50 MG tablet     traZODone (DESYREL) 50 MG tablet     venlafaxine (EFFEXOR-XR) 75 MG 24 hr capsule     No current facility-administered medications for this visit.        Allergies   Allergen Reactions     Bactrim [Sulfamethoxazole W/Trimethoprim]      Internal bleeding     Copaxone [Glatiramer] Hives          PHYSICAL EXAMINATION:     There were no vitals taken for this visit.  Wt Readings from Last 4 Encounters:   11/22/21 65.5 kg (144 lb 6.4 oz)   11/16/21 64.9 kg (143 lb)   10/26/21 62.1 kg (137 lb)   10/19/21 63.4 kg (139 lb 11.2 oz)       CONSTITUTIONAL: no acute distress  EYES: PERRLA, no palor or icterus.   ENT/MOUTH: no mouth lesions. Ears normal  CVS:  s1s2 no m r g .   RESPIRATORY: clear to auscultation b/l  GI: soft non tender no hepatosplenomegaly  NEURO: AAOX3  Grossly non focal neuro exam  INTEGUMENT: no obvious rashes  LYMPHATIC: no palpable LAD  MUSCULOSKELETAL: Unremarkable. No bony tenderness.   EXTREMITIES: no edema  PSYCH: Mentation, mood and affect are normal. Decision making capacity is intact      Labs and Imaging.    Reviewed.    CBC unremarkable.    Other labs are pending from today.    11/22/2021.  CBC unremarkable  CMP unremarkable.    CA 27-29 was 940 on 11/16/2021.  CA 27-29 was 1176 on 9/8/2021.  It was 1023 on 10/19/2021.    9/28/2021.      Iron studies, ferritin is 101, iron 51, TIBC 268 and iron saturation index 19%.    CA 27-29 was 465 on 6/16/2021.  It was 545 on 5/26/2021      PET/CT on 9/24/2021 showed stable findings with no evidence of FDG avid metastatic disease within the body.  Left axillary lymph nodes are prominent and hypermetabolic and likely from recent vaccinations in the left deltoid muscle.  Healed bony metastasis seems stable.      Biopsy from the right acetabulum shows metastatic lobular carcinoma.  It is triple negative.  Androgen receptor was diffusely positive (90%, moderate intensity) by immunohistochemistry. )  PD-L1 negative.    Foundation one showed CDH1 mutation (initially lobular cancer), CCND1 amplification ( equivocal ). FGF3, FGF4, FGF19 amplification ( Equivocal ). Most promising is CCND1 amplification with abemaciclib showing response in 4/10 patients. FGF3,FGF4 and FGF19 are targetable with pan-FGFR inhibitors.           ASSESSMENT AND PLAN:     Now she has metastatic breast cancer negative breast cancer (androgen receptor positive ) with extensive involvement of the bones.  There is also a left lower lobe hypermetabolic lesion and mediastinal lymph nodes which are hypermetabolic.  The biopsy from the right iliac bone is consistent with metastatic lobular breast cancer but it is hormone receptor and HER-2  negative and PDL 1 is also negative.    Foundation 1 testing did not reveal any actionable mutations.    She was intolerant to Xeloda and progressed on Taxol and eribulin.      We then switched to recently FDA approved sacituzumab govitecan-hziy (Trodelvy) for TNBC, based on the results of the phase II IMMU-132-01 clinical trial.   She started this on 11/11/2020.      We delayed the start of cycle number 3 x 1 week and decrease the dose of chemotherapy by 25% and she also has neutropenia with ANC of 1.  She started cycle number 3-day #1 on 12/29/2020.      After 4 cycles of chemotherapy, overall the PET scan shows positive response to treatment apart from mildly increased FDG avidity in one of the right iliac bone lesions.  CA 27-29 was 454 slightly elevated from before.    We decided on continuing the same chemotherapy.      She obtained a second opinion from Dr. Castillo from [facility] who recommended a repeat biopsy to be done to make sure that she really has triple negative breast cancer.      She also met with Dr. Arelis Arshad on 3/18/2021.      After 10 cycles of Trodelvy, she had PET/CT on 6/11/2021 which showed mildly increased uptake in some of the bone lesions while a stability in other bone lesions.      We discussed the situation in detail.      Because of minimal progression of the disease plus the fact that she had been tolerating treatments well, we decided on continuing the same chemotherapy.     After 15 cycles, repeat PET scan showed stable findings.  CA 27-29 has been increasing and it is 1176.      As she was tolerating the chemotherapy well and her quality of life has been decent and scans were stable although she had a rising CA 27-29, we decided on continuing the same chemotherapy because it has been a very slow progression of the disease.    Foundation one showed CDH1 mutation (initially lobular cancer), CCND1 amplification ( equivocal ). FGF3, FGF4, FGF19 amplification ( Equivocal  ). Most promising is CCND1 amplification with abemaciclib showing response in 4/10 patients. FGF3,FGF4 and FGF19 are targetable with pan-FGFR inhibitor    As the tumor is diffusely positive for androgen receptor, we will consider giving her androgen receptor blockers like Casodex 150 mg daily.    We can consider Doxil every 4 weeks.     She met with Dr. Mejia to consider clinical trial but at this time she is not eligible for any.    She has completed 18 cycles of Trodelvy.      She is doing well and chemotherapy is being tolerated nicely.  Continue with cycle #19 today provided the labs are stable.          Diarrhea.  This has not recurred.  She can take Imodium as needed.     Bone disease.  She has metastatic disease to the bone.  Previously she got palliative radiation to T12-L5 3000 cGy in 10 fractions from 9/6/2019 till 9/18/2019.  She was evaluated by orthopedics Dr. Bipin Elliott on 11/1/2019 and conservative management was recommended.    She was on Zometa every 3 months. She last received on 9/29/2020.  Because of progression of the disease in the bones, we switched to Xgeva which she received on 11/11/2020.  PET scan is stable.  Bone lesions show treated meta stasis.  Continue Xgeva every month.  She last received on 11/22/2021.  Continue vitamin D and calcium.    Pain management.  She has noticed more pain.  For now continue MS Contin and morphine sulfate twice daily and discuss with palliative care.      Bilateral pulmonary emboli.  Eliquis is going well.  PE was found incidentally.  Continue to take Eliquis.      Neuropathy.  This remains mild and stable with neuropathy in her hands.    This is in addition to the chronic balance issues and heat and cold sensitivity from underlying multiple sclerosis which is also stable for years.  Continue to monitor.     We did not address the following today.  Subcutaneous nodule in the scalp.  This looks like an epidermoid cyst.  She thinks it is a stable.   Continue to monitor.       Insomnia.  Continue trazodone.      Wall thickening of esophagus and FDG uptake of fundus of the stomach.  It could be in the setting of inflammation from recent nausea and vomiting.  Symptoms have resolved.  Continue to monitor clinically.    Vision changes.    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  It was thought that this could be due to Taxol as patient reported to the ophthalmologist that she was on Taxol in September 2019 when her symptoms started.  But she was not on Taxol in September 2019 when she started noticing the visual changes so Taxol is not responsible for this.  The first dose of Taxol was on 11/5/2019.   She had left cataract extraction on 2/8/2021 and she has noticed significant improvement in her vision.  She plans to do cataract extraction of the right eye in couple of weeks.      Increased FDG ability in the colon.  She had FDG uptake in the cecum and ascending colon but no corresponding lesion was seen on the CT scan.  She is completely asymptomatic so at this time we will continue to observe.    Discussion regarding healthcare directives.  On previous visit she told me that she will bring the health care directive for our records but she forgot so I told her to bring it on next visit.      Breast implant removal.  She tells me that she was planning to do breast implant removal because there was a recall on this.  Her surgery was scheduled for 9/24/2019.  Because of this new development of metastatic breast cancer and her recent radiation, surgery has been canceled.  We discussed that at this time treatment for metastatic breast cancer would take precedence over the other surgery as the other surgery would require her to be off chemotherapy for several weeks and in that case the chances of cancer progression would be high and I would not recommend that.    Multiple sclerosis.  She will continue to follow with her neurologist  Dr. Quiles.  Currently multiple sclerosis is under control and currently she is not taking any medications.      Return to clinic as scheduled      All of her questions were answered to her satisfaction.  She is agreeable and comfortable with the plan.    Dewayne Zepeda MD          Again, thank you for allowing me to participate in the care of your patient.        Sincerely,        Dewayne Zepeda MD

## 2021-12-07 NOTE — NURSING NOTE
"Oncology Rooming Note    December 7, 2021 10:22 AM   Shaneka Alvarez is a 61 year old female who presents for:    Chief Complaint   Patient presents with     Oncology Clinic Visit     Follow up     Initial Vitals: /81 (BP Location: Left arm, Patient Position: Sitting, Cuff Size: Adult Regular)   Pulse 74   Temp 98.2  F (36.8  C) (Oral)   Resp 16   Wt 65.2 kg (143 lb 12.8 oz)   SpO2 100%   BMI 26.30 kg/m   Estimated body mass index is 26.3 kg/m  as calculated from the following:    Height as of 9/29/21: 1.575 m (5' 2\").    Weight as of this encounter: 65.2 kg (143 lb 12.8 oz). Body surface area is 1.69 meters squared.  No Pain (0) Comment: Data Unavailable   No LMP recorded. Patient is postmenopausal.  Allergies reviewed: Yes  Medications reviewed: Yes    Medications: Medication refills not needed today.  Pharmacy name entered into [facility]:    [id number] - [place], [place] - [address] [id number] - [place], [place] - [address] [facility] [address] [facility] [address]  [facility] - [place], [place] - [address] [facility] - [place], [place] - [address] [place] [address] HOME DELIVERY - [place], [place] - [address] [facility]    Clinical concerns: none         Rocío James CMA            "

## 2021-12-14 ENCOUNTER — LAB (OUTPATIENT)
Dept: ONCOLOGY | Facility: CLINIC | Age: 61
End: 2021-12-14
Payer: MEDICARE

## 2021-12-14 ENCOUNTER — INFUSION THERAPY VISIT (OUTPATIENT)
Dept: INFUSION THERAPY | Facility: CLINIC | Age: 61
End: 2021-12-14
Payer: MEDICARE

## 2021-12-14 VITALS
HEART RATE: 83 BPM | DIASTOLIC BLOOD PRESSURE: 71 MMHG | BODY MASS INDEX: 26.05 KG/M2 | TEMPERATURE: 98.1 F | RESPIRATION RATE: 16 BRPM | OXYGEN SATURATION: 100 % | SYSTOLIC BLOOD PRESSURE: 106 MMHG | WEIGHT: 142.4 LBS

## 2021-12-14 DIAGNOSIS — C79.51 BONE METASTASES: Primary | ICD-10-CM

## 2021-12-14 DIAGNOSIS — D70.1 CHEMOTHERAPY-INDUCED NEUTROPENIA (H): ICD-10-CM

## 2021-12-14 DIAGNOSIS — D70.1 CHEMOTHERAPY-INDUCED NEUTROPENIA (H): Primary | ICD-10-CM

## 2021-12-14 DIAGNOSIS — T45.1X5A CHEMOTHERAPY-INDUCED NEUTROPENIA (H): ICD-10-CM

## 2021-12-14 DIAGNOSIS — E87.6 HYPOKALEMIA: Primary | ICD-10-CM

## 2021-12-14 DIAGNOSIS — C50.912 BILATERAL MALIGNANT NEOPLASM OF BREAST IN FEMALE, UNSPECIFIED ESTROGEN RECEPTOR STATUS, UNSPECIFIED SITE OF BREAST (H): ICD-10-CM

## 2021-12-14 DIAGNOSIS — C50.919 METASTATIC BREAST CANCER: ICD-10-CM

## 2021-12-14 DIAGNOSIS — C50.911 BILATERAL MALIGNANT NEOPLASM OF BREAST IN FEMALE, UNSPECIFIED ESTROGEN RECEPTOR STATUS, UNSPECIFIED SITE OF BREAST (H): ICD-10-CM

## 2021-12-14 DIAGNOSIS — E87.6 HYPOKALEMIA: ICD-10-CM

## 2021-12-14 DIAGNOSIS — G47.00 INSOMNIA, UNSPECIFIED TYPE: ICD-10-CM

## 2021-12-14 DIAGNOSIS — T45.1X5A CHEMOTHERAPY-INDUCED NEUTROPENIA (H): Primary | ICD-10-CM

## 2021-12-14 LAB
ALBUMIN SERPL-MCNC: 3.3 G/DL (ref 3.4–5)
ALP SERPL-CCNC: 64 U/L (ref 40–150)
ALT SERPL W P-5'-P-CCNC: 21 U/L (ref 0–50)
ANION GAP SERPL CALCULATED.3IONS-SCNC: 6 MMOL/L (ref 3–14)
AST SERPL W P-5'-P-CCNC: 14 U/L (ref 0–45)
BASOPHILS # BLD AUTO: 0 10E3/UL (ref 0–0.2)
BASOPHILS NFR BLD AUTO: 1 %
BILIRUB SERPL-MCNC: 0.2 MG/DL (ref 0.2–1.3)
BUN SERPL-MCNC: 13 MG/DL (ref 7–30)
CALCIUM SERPL-MCNC: 8.6 MG/DL (ref 8.5–10.1)
CHLORIDE BLD-SCNC: 104 MMOL/L (ref 94–109)
CO2 SERPL-SCNC: 28 MMOL/L (ref 20–32)
CREAT SERPL-MCNC: 0.73 MG/DL (ref 0.52–1.04)
EOSINOPHIL # BLD AUTO: 0.1 10E3/UL (ref 0–0.7)
EOSINOPHIL NFR BLD AUTO: 4 %
ERYTHROCYTE [DISTWIDTH] IN BLOOD BY AUTOMATED COUNT: 14.5 % (ref 10–15)
GFR SERPL CREATININE-BSD FRML MDRD: 90 ML/MIN/1.73M2
GLUCOSE BLD-MCNC: 105 MG/DL (ref 70–99)
HCT VFR BLD AUTO: 35 % (ref 35–47)
HGB BLD-MCNC: 11.4 G/DL (ref 11.7–15.7)
IMM GRANULOCYTES # BLD: 0.1 10E3/UL
IMM GRANULOCYTES NFR BLD: 3 %
LYMPHOCYTES # BLD AUTO: 0.6 10E3/UL (ref 0.8–5.3)
LYMPHOCYTES NFR BLD AUTO: 19 %
MAGNESIUM SERPL-MCNC: 1.9 MG/DL (ref 1.6–2.3)
MCH RBC QN AUTO: 28.2 PG (ref 26.5–33)
MCHC RBC AUTO-ENTMCNC: 32.6 G/DL (ref 31.5–36.5)
MCV RBC AUTO: 87 FL (ref 78–100)
MONOCYTES # BLD AUTO: 0.5 10E3/UL (ref 0–1.3)
MONOCYTES NFR BLD AUTO: 16 %
NEUTROPHILS # BLD AUTO: 1.7 10E3/UL (ref 1.6–8.3)
NEUTROPHILS NFR BLD AUTO: 57 %
NRBC # BLD AUTO: 0 10E3/UL
NRBC BLD AUTO-RTO: 0 /100
PHOSPHATE SERPL-MCNC: 4.3 MG/DL (ref 2.5–4.5)
PLATELET # BLD AUTO: 188 10E3/UL (ref 150–450)
POTASSIUM BLD-SCNC: 3.3 MMOL/L (ref 3.4–5.3)
PROT SERPL-MCNC: 6.1 G/DL (ref 6.8–8.8)
RBC # BLD AUTO: 4.04 10E6/UL (ref 3.8–5.2)
SODIUM SERPL-SCNC: 138 MMOL/L (ref 133–144)
WBC # BLD AUTO: 3 10E3/UL (ref 4–11)

## 2021-12-14 PROCEDURE — 80053 COMPREHEN METABOLIC PANEL: CPT | Performed by: NURSE PRACTITIONER

## 2021-12-14 PROCEDURE — 83735 ASSAY OF MAGNESIUM: CPT | Performed by: NURSE PRACTITIONER

## 2021-12-14 PROCEDURE — 99207 PR NO CHARGE LOS: CPT

## 2021-12-14 PROCEDURE — 96413 CHEMO IV INFUSION 1 HR: CPT | Performed by: NURSE PRACTITIONER

## 2021-12-14 PROCEDURE — 85025 COMPLETE CBC W/AUTO DIFF WBC: CPT | Performed by: NURSE PRACTITIONER

## 2021-12-14 PROCEDURE — 96375 TX/PRO/DX INJ NEW DRUG ADDON: CPT | Performed by: NURSE PRACTITIONER

## 2021-12-14 PROCEDURE — 99207 PR NO CHARGE NURSE ONLY: CPT

## 2021-12-14 PROCEDURE — 96367 TX/PROPH/DG ADDL SEQ IV INF: CPT | Performed by: NURSE PRACTITIONER

## 2021-12-14 PROCEDURE — 84100 ASSAY OF PHOSPHORUS: CPT | Performed by: NURSE PRACTITIONER

## 2021-12-14 PROCEDURE — 96415 CHEMO IV INFUSION ADDL HR: CPT | Performed by: NURSE PRACTITIONER

## 2021-12-14 RX ORDER — TRAZODONE HYDROCHLORIDE 50 MG/1
TABLET, FILM COATED ORAL
Qty: 30 TABLET | Refills: 1 | Status: SHIPPED | OUTPATIENT
Start: 2021-12-14 | End: 2022-02-10

## 2021-12-14 RX ORDER — ACETAMINOPHEN 325 MG/1
650 TABLET ORAL ONCE
Status: COMPLETED | OUTPATIENT
Start: 2021-12-14 | End: 2021-12-14

## 2021-12-14 RX ORDER — DIPHENHYDRAMINE HCL 25 MG
50 CAPSULE ORAL ONCE
Status: COMPLETED | OUTPATIENT
Start: 2021-12-14 | End: 2021-12-14

## 2021-12-14 RX ORDER — HEPARIN SODIUM (PORCINE) LOCK FLUSH IV SOLN 100 UNIT/ML 100 UNIT/ML
5 SOLUTION INTRAVENOUS EVERY 8 HOURS
Status: DISCONTINUED | OUTPATIENT
Start: 2021-12-14 | End: 2021-12-20 | Stop reason: HOSPADM

## 2021-12-14 RX ORDER — HEPARIN SODIUM (PORCINE) LOCK FLUSH IV SOLN 100 UNIT/ML 100 UNIT/ML
5 SOLUTION INTRAVENOUS
Status: DISCONTINUED | OUTPATIENT
Start: 2021-12-14 | End: 2021-12-14 | Stop reason: HOSPADM

## 2021-12-14 RX ADMIN — Medication 50 MG: at 09:42

## 2021-12-14 RX ADMIN — HEPARIN SODIUM (PORCINE) LOCK FLUSH IV SOLN 100 UNIT/ML 5 ML: 100 SOLUTION at 09:18

## 2021-12-14 RX ADMIN — ACETAMINOPHEN 650 MG: 325 TABLET ORAL at 09:42

## 2021-12-14 RX ADMIN — Medication 500 ML: at 09:42

## 2021-12-14 RX ADMIN — HEPARIN SODIUM (PORCINE) LOCK FLUSH IV SOLN 100 UNIT/ML 5 ML: 100 SOLUTION at 12:47

## 2021-12-14 ASSESSMENT — PAIN SCALES - GENERAL: PAINLEVEL: NO PAIN (0)

## 2021-12-14 NOTE — PROGRESS NOTES
Infusion Nursing Note:  Shaneka Alvarez presents today for C19D8 Trodelvy.    Patient seen by provider today: No   present during visit today: Not Applicable.    Note: N/A.    Intravenous Access:  Implanted Port.    Treatment Conditions:  Lab Results   Component Value Date    HGB 11.4 (L) 12/14/2021    WBC 3.0 (L) 12/14/2021    ANEU 1.9 10/26/2021    ANEUTAUTO 1.7 12/14/2021     12/14/2021      Results reviewed, labs MET treatment parameters, ok to proceed with treatment.    Post Infusion Assessment:  Patient tolerated infusion without incident.  Patient observed for 30 minutes post Trodelvy per protocol.  Blood return noted pre and post infusion.  Site patent and intact, free from redness, edema or discomfort.  No evidence of extravasations.  Access discontinued per protocol.     Discharge Plan:   Patient will return 12/28/2021 for next appointment.   Patient discharged in stable condition accompanied by: self.  Departure Mode: Ambulatory.    Joanne Ludwig RN-BSN, PHN, OCN  [facility]

## 2021-12-14 NOTE — PROGRESS NOTES
SPIRITUAL HEALTH SERVICES  SPIRITUAL ASSESSMENT Progress Note  [facility] Oncology [facility]       REFERRAL SOURCE:  self referral     Visited with Shaneka Alvarez  in the [facility] for ongoing emotional/spiritual support.  Reflective conversation shared of how her cancer experience has transformed her view of life, she is much more likely to see the good things in the moment and be more forgiving of others transgressions.  She reports that she had a good time with family during the Thanksgiving holiday and plans to have a quiet [place].  She has been on the same treatment for over a year and feels that she is tolerating it well.  She voiced her appreciation of all the staff here at [facility] [place].     PLAN: Patient  knows that she can request a Spiritual Health encounter as needed.     Gina Pineda  Staff

## 2021-12-21 ENCOUNTER — HOSPITAL ENCOUNTER (OUTPATIENT)
Dept: PET IMAGING | Facility: CLINIC | Age: 61
Discharge: HOME | End: 2021-12-21
Attending: INTERNAL MEDICINE | Admitting: INTERNAL MEDICINE
Payer: MEDICARE

## 2021-12-21 DIAGNOSIS — C50.919 METASTATIC BREAST CANCER: ICD-10-CM

## 2021-12-21 DIAGNOSIS — C79.51 BONE METASTASES: ICD-10-CM

## 2021-12-21 PROCEDURE — 74177 CT ABD & PELVIS W/CONTRAST: CPT | Mod: 59,PS

## 2021-12-21 PROCEDURE — 78816 PET IMAGE W/CT FULL BODY: CPT | Mod: 26 | Performed by: RADIOLOGY

## 2021-12-21 PROCEDURE — 343N000001 HC RX 343: Performed by: INTERNAL MEDICINE

## 2021-12-21 PROCEDURE — 250N000011 HC RX IP 250 OP 636: Performed by: INTERNAL MEDICINE

## 2021-12-21 PROCEDURE — 71260 CT THORAX DX C+: CPT | Mod: 26 | Performed by: RADIOLOGY

## 2021-12-21 PROCEDURE — 74177 CT ABD & PELVIS W/CONTRAST: CPT | Mod: 26 | Performed by: RADIOLOGY

## 2021-12-21 PROCEDURE — 999N000127 HC STATISTIC PERIPHERAL IV START W US GUIDANCE

## 2021-12-21 PROCEDURE — A9552 F18 FDG: HCPCS | Performed by: INTERNAL MEDICINE

## 2021-12-21 RX ORDER — IOPAMIDOL 755 MG/ML
45-150 INJECTION, SOLUTION INTRAVASCULAR ONCE
Status: COMPLETED | OUTPATIENT
Start: 2021-12-21 | End: 2021-12-21

## 2021-12-21 RX ADMIN — IOPAMIDOL 86 ML: 755 INJECTION, SOLUTION INTRAVENOUS at 14:12

## 2021-12-21 RX ADMIN — FLUDEOXYGLUCOSE F-18 9.97 MCI.: 500 INJECTION, SOLUTION INTRAVENOUS at 14:08

## 2021-12-22 ENCOUNTER — VIRTUAL VISIT (OUTPATIENT)
Dept: ONCOLOGY | Facility: CLINIC | Age: 61
End: 2021-12-22
Payer: MEDICARE

## 2021-12-22 DIAGNOSIS — C79.51 BONE METASTASES: Primary | ICD-10-CM

## 2021-12-22 LAB — RADIOLOGIST FLAGS: NORMAL

## 2021-12-22 PROCEDURE — 99443 PR PHYSICIAN TELEPHONE EVALUATION 21-30 MIN: CPT | Mod: 95 | Performed by: HOSPITALIST

## 2021-12-22 NOTE — PROGRESS NOTES
Shaneka is a 61 year old who is being evaluated via a billable video visit.  Currently in State of [place].    How would you like to obtain your AVS? MyChart  If the video visit is dropped, the invitation should be resent by: Text to cell phone: [phone]  Will anyone else be joining your video visit? No    Aruna Murdock     Palliative Care Outpatient Clinic Progress Note    Patient Name: Shaneka Alvarez is being evaluated via a billable telephone visit.    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 Didn't tolerate Xeloda, paclitaxel Nov 2019-Jan 2020, treated with eribulin and zometa. PD seen on PET Nov 2020 as well as a new L 4th rib concerning for a pathologic fracture. Started Trodelvy 11/11/20              - admission Dec 2020 for presumed treatment side effects, continued with Trodelvy at reduced dose. Stable disease on PET Sept 2021. Her tumor marker has risen, though, which might signal that the immunotherapy is becoming less effective  - b/l PE found on PET July 2020, on anticoagulation  - MS with mild right-sided weakness    - long-standing history of migraines        Interim History:  At the last visit we discussed her migraines (started nerve stimulator)    Patient is on the call by herself.     She still has lower back pain which hasn't changed in quality, location, severity, but the morphine doesn't seem to help anymore. She takes MS Contin 15mg with MSIR 15mg very consistently twice a day. It remains unclear to me  when and why she decreased from 30mg q12 for the MS Contin. The pain stops her from doing house work (vacuuming, doing dishes, sweeping, etc).   Discussed buprenorphien as an option, she agrees to transition after the holidays. Until then I encourage her to take MSIR truly as needed rather than on a schedule.     Social History:  Pertinent changes to social history/social situation since last visit: no changes    Advance Directive Status:   POLST completed 20: DNR/selective treatments        Impression & Recommendations & Counseliny/o woman with metastatic breast cancer    Pain: stable in quality, location, likely severity, but she feels morphine is less effective. Agrees to transition to buprenorphine, will transition with microdosing after the holidays.       Return to clinic in 4-6 weeks    Data / Chart Review:    Review of Systems:   ROS: 10 point ROS neg other than the symptoms noted above in the HPI and pertinents here.         Physical Exam:   Physical Exam:  Vital Signs not checked, virtual visit    Telephone Visit    The rest of a comprehensive physical examination is deferred  due to public health emergency video visit restrictions.        Current pertinent medications:  MS Contin 15mg bid  MSIR 15-30mg q3h prn              Naloxone prescribed  acetaminophen 1000mg tid  CBD cream     Dexamethasone 4mg q12h     Venlafaxine 225mg daily  Lorazepam 1mg prn  Trazodone 50mg HS  Buspirone 15mg bid     Olanzapine prn after chemo  Promethazine q6h prn  Senna bid prn, miralax bid prn     : ok     Opioid safety assessment:   Expected prognosis >1 year  Risk: Low (ORT 0)  Indication for Opioid Use: Moderate or Strong  Baseline UDT performed on: not sent yet  Naloxone Script provided if patient also on benzodiazepine: 2019   Indications for Tapering reviewed: yes, at last visit       No pertinent allergies        Lab and imaging data reviewed:  Comments:     Telephone call: 23 minutes (unable to get  adequate audio on doximity, patient couldn't connect on amwell at all)    Alva Whitaker MD  Palliative Medicine  Pager [phone]

## 2021-12-22 NOTE — LETTER
12/22/2021         RE: Shaneka Alvarez  [id number] [address] [address]        Dear Colleague,    Thank you for referring your patient, Shaneka Alvarez, to the [facility]. Please see a copy of my visit note below.    Shaneka is a 61 year old who is being evaluated via a billable video visit.  Currently in State of [place].    How would you like to obtain your AVS? MyChart  If the video visit is dropped, the invitation should be resent by: Text to cell phone: [phone]  Will anyone else be joining your video visit? No    Aruna Murdock     Palliative Care Outpatient Clinic Progress Note    Patient Name: Shaneka Alvraez is being evaluated via a billable telephone visit.    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 Didn't tolerate Xeloda, paclitaxel Nov 2019-Jan 2020, treated with eribulin and zometa. PD seen on PET Nov 2020 as well as a new L 4th rib concerning for a pathologic fracture. Started Trodelvy 11/11/20              - admission Dec 2020 for presumed treatment side effects, continued with Trodelvy at reduced dose. Stable disease on PET Sept 2021. Her tumor marker has risen, though, which might signal that the immunotherapy is becoming less effective  - b/l PE found on PET July 2020, on anticoagulation  - MS with mild right-sided weakness    - long-standing history of migraines        Interim History:  At the last visit we discussed her migraines (started nerve  stimulator)    Patient is on the call by herself.     She still has lower back pain which hasn't changed in quality, location, severity, but the morphine doesn't seem to help anymore. She takes MS Contin 15mg with MSIR 15mg very consistently twice a day. It remains unclear to me when and why she decreased from 30mg q12 for the MS Contin. The pain stops her from doing house work (vacuuming, doing dishes, sweeping, etc).   Discussed buprenorphien as an option, she agrees to transition after the holidays. Until then I encourage her to take MSIR truly as needed rather than on a schedule.     Social History:  Pertinent changes to social history/social situation since last visit: no changes    Advance Directive Status:   POLST completed 20: DNR/selective treatments        Impression & Recommendations & Counseliny/o woman with metastatic breast cancer    Pain: stable in quality, location, likely severity, but she feels morphine is less effective. Agrees to transition to buprenorphine, will transition with microdosing after the holidays.       Return to clinic in 4-6 weeks    Data / Chart Review:    Review of Systems:   ROS: 10 point ROS neg other than the symptoms noted above in the HPI and pertinents here.         Physical Exam:   Physical Exam:  Vital Signs not checked, virtual visit    Telephone Visit    The rest of a comprehensive physical examination is deferred  due to public health emergency video visit restrictions.        Current pertinent medications:  MS Contin 15mg bid  MSIR 15-30mg q3h prn              Naloxone prescribed  acetaminophen 1000mg tid  CBD cream     Dexamethasone 4mg q12h     Venlafaxine 225mg daily  Lorazepam 1mg prn  Trazodone 50mg HS  Buspirone 15mg bid     Olanzapine prn after chemo  Promethazine q6h prn  Senna bid prn, miralax bid prn     : ok     Opioid safety assessment:   Expected prognosis >1 year  Risk: Low (ORT 0)  Indication for Opioid Use: Moderate or Strong  Baseline UDT  performed on: not sent yet  Naloxone Script provided if patient also on benzodiazepine: 12/4/2019   Indications for Tapering reviewed: yes, at last visit       No pertinent allergies        Lab and imaging data reviewed:  Comments:     Telephone call: 23 minutes (unable to get adequate audio on doximity, patient couldn't connect on amwell at all)    Alva Whitaker MD  Palliative Medicine  Pager [phone]        Again, thank you for allowing me to participate in the care of your patient.        Sincerely,        Alva Whitaker MD

## 2021-12-23 ENCOUNTER — PATIENT OUTREACH (OUTPATIENT)
Dept: ONCOLOGY | Facility: CLINIC | Age: 61
End: 2021-12-23
Payer: MEDICARE

## 2021-12-23 DIAGNOSIS — C50.919 METASTATIC BREAST CANCER: Primary | ICD-10-CM

## 2021-12-23 NOTE — PROGRESS NOTES
Patient is asymptomatic for COVID-19.  Dr. Zepeda advises that she has COVID testing.  No appointments available at [facility] would be through the [facility] but this is a long drive.  She was instructed to go through the [place] [facility].  Writer provided her the address; most of the time they will take walk-ins; they would be closed tomorrow and the 25th.    She has infusion appointment on 12/28.

## 2021-12-23 NOTE — PROGRESS NOTES
Call received to report incidental finding on recent PET scan:      New scattered areas of groundglass throughout both lungs. Findings  are indeterminate but may represent an infectious etiology. COVID-19  could have this appearance.

## 2021-12-28 ENCOUNTER — INFUSION THERAPY VISIT (OUTPATIENT)
Dept: INFUSION THERAPY | Facility: CLINIC | Age: 61
End: 2021-12-28
Payer: MEDICARE

## 2021-12-28 ENCOUNTER — VIRTUAL VISIT (OUTPATIENT)
Dept: ONCOLOGY | Facility: CLINIC | Age: 61
End: 2021-12-28
Payer: MEDICARE

## 2021-12-28 ENCOUNTER — TELEPHONE (OUTPATIENT)
Dept: ONCOLOGY | Facility: CLINIC | Age: 61
End: 2021-12-28

## 2021-12-28 ENCOUNTER — LAB (OUTPATIENT)
Dept: ONCOLOGY | Facility: CLINIC | Age: 61
End: 2021-12-28
Payer: MEDICARE

## 2021-12-28 ENCOUNTER — DOCUMENTATION ONLY (OUTPATIENT)
Dept: ONCOLOGY | Facility: CLINIC | Age: 61
End: 2021-12-28

## 2021-12-28 DIAGNOSIS — D70.1 CHEMOTHERAPY-INDUCED NEUTROPENIA (H): Primary | ICD-10-CM

## 2021-12-28 DIAGNOSIS — C50.919 METASTATIC BREAST CANCER: ICD-10-CM

## 2021-12-28 DIAGNOSIS — E87.6 HYPOKALEMIA: ICD-10-CM

## 2021-12-28 DIAGNOSIS — C50.912 BILATERAL MALIGNANT NEOPLASM OF BREAST IN FEMALE, UNSPECIFIED ESTROGEN RECEPTOR STATUS, UNSPECIFIED SITE OF BREAST (H): ICD-10-CM

## 2021-12-28 DIAGNOSIS — T45.1X5A CHEMOTHERAPY-INDUCED NEUTROPENIA (H): Primary | ICD-10-CM

## 2021-12-28 DIAGNOSIS — C79.51 BONE METASTASES: Primary | ICD-10-CM

## 2021-12-28 DIAGNOSIS — C50.911 BILATERAL MALIGNANT NEOPLASM OF BREAST IN FEMALE, UNSPECIFIED ESTROGEN RECEPTOR STATUS, UNSPECIFIED SITE OF BREAST (H): ICD-10-CM

## 2021-12-28 LAB
ALBUMIN SERPL-MCNC: 3.2 G/DL (ref 3.4–5)
ALP SERPL-CCNC: 64 U/L (ref 40–150)
ALT SERPL W P-5'-P-CCNC: 22 U/L (ref 0–50)
ANION GAP SERPL CALCULATED.3IONS-SCNC: 4 MMOL/L (ref 3–14)
AST SERPL W P-5'-P-CCNC: 15 U/L (ref 0–45)
BASOPHILS # BLD AUTO: 0 10E3/UL (ref 0–0.2)
BASOPHILS NFR BLD AUTO: 1 %
BILIRUB SERPL-MCNC: 0.2 MG/DL (ref 0.2–1.3)
BUN SERPL-MCNC: 9 MG/DL (ref 7–30)
CALCIUM SERPL-MCNC: 9.3 MG/DL (ref 8.5–10.1)
CANCER AG27-29 SERPL-ACNC: 1077 U/ML (ref 0–39)
CHLORIDE BLD-SCNC: 104 MMOL/L (ref 94–109)
CO2 SERPL-SCNC: 30 MMOL/L (ref 20–32)
CREAT SERPL-MCNC: 0.75 MG/DL (ref 0.52–1.04)
EOSINOPHIL # BLD AUTO: 0.1 10E3/UL (ref 0–0.7)
EOSINOPHIL NFR BLD AUTO: 4 %
ERYTHROCYTE [DISTWIDTH] IN BLOOD BY AUTOMATED COUNT: 14.9 % (ref 10–15)
GFR SERPL CREATININE-BSD FRML MDRD: >90 ML/MIN/1.73M2
GLUCOSE BLD-MCNC: 102 MG/DL (ref 70–99)
HCT VFR BLD AUTO: 33.7 % (ref 35–47)
HGB BLD-MCNC: 10.9 G/DL (ref 11.7–15.7)
IMM GRANULOCYTES # BLD: 0 10E3/UL
IMM GRANULOCYTES NFR BLD: 0 %
LYMPHOCYTES # BLD AUTO: 0.5 10E3/UL (ref 0.8–5.3)
LYMPHOCYTES NFR BLD AUTO: 19 %
MAGNESIUM SERPL-MCNC: 2 MG/DL (ref 1.6–2.3)
MCH RBC QN AUTO: 28.5 PG (ref 26.5–33)
MCHC RBC AUTO-ENTMCNC: 32.3 G/DL (ref 31.5–36.5)
MCV RBC AUTO: 88 FL (ref 78–100)
MONOCYTES # BLD AUTO: 0.5 10E3/UL (ref 0–1.3)
MONOCYTES NFR BLD AUTO: 20 %
NEUTROPHILS # BLD AUTO: 1.4 10E3/UL (ref 1.6–8.3)
NEUTROPHILS NFR BLD AUTO: 56 %
NRBC # BLD AUTO: 0 10E3/UL
NRBC BLD AUTO-RTO: 0 /100
PHOSPHATE SERPL-MCNC: 5.4 MG/DL (ref 2.5–4.5)
PLATELET # BLD AUTO: 159 10E3/UL (ref 150–450)
POTASSIUM BLD-SCNC: 3.7 MMOL/L (ref 3.4–5.3)
PROT SERPL-MCNC: 6.4 G/DL (ref 6.8–8.8)
RBC # BLD AUTO: 3.82 10E6/UL (ref 3.8–5.2)
SODIUM SERPL-SCNC: 138 MMOL/L (ref 133–144)
WBC # BLD AUTO: 2.5 10E3/UL (ref 4–11)

## 2021-12-28 PROCEDURE — 99207 PR NO CHARGE LOS: CPT

## 2021-12-28 PROCEDURE — 86300 IMMUNOASSAY TUMOR CA 15-3: CPT | Performed by: INTERNAL MEDICINE

## 2021-12-28 PROCEDURE — 84100 ASSAY OF PHOSPHORUS: CPT | Performed by: INTERNAL MEDICINE

## 2021-12-28 PROCEDURE — 99214 OFFICE O/P EST MOD 30 MIN: CPT | Mod: 95 | Performed by: INTERNAL MEDICINE

## 2021-12-28 PROCEDURE — 80053 COMPREHEN METABOLIC PANEL: CPT | Performed by: INTERNAL MEDICINE

## 2021-12-28 PROCEDURE — 85025 COMPLETE CBC W/AUTO DIFF WBC: CPT | Performed by: INTERNAL MEDICINE

## 2021-12-28 PROCEDURE — 96372 THER/PROPH/DIAG INJ SC/IM: CPT | Performed by: NURSE PRACTITIONER

## 2021-12-28 PROCEDURE — 83735 ASSAY OF MAGNESIUM: CPT | Performed by: INTERNAL MEDICINE

## 2021-12-28 RX ORDER — HEPARIN SODIUM (PORCINE) LOCK FLUSH IV SOLN 100 UNIT/ML 100 UNIT/ML
500 SOLUTION INTRAVENOUS
Status: DISCONTINUED | OUTPATIENT
Start: 2021-12-28 | End: 2021-12-28 | Stop reason: HOSPADM

## 2021-12-28 RX ADMIN — HEPARIN SODIUM (PORCINE) LOCK FLUSH IV SOLN 100 UNIT/ML 500 UNITS: 100 SOLUTION at 10:21

## 2021-12-28 NOTE — PROGRESS NOTES
ONCOLOGY FOLLOW UP NOTE: 12/28/21        I took the history from reviewing the previous notes that she was following with Dr. Amaya.  I have copied and updated from prior notes.    ONCOLOGY History:    1.  January 2006:  Diagnosed with Stage IIB, T2 N1 M0 invasive lobular carcinoma of the right breast.  Final pathology showed a 4.5 x 3.8 x 2.5 cm, 01/14 lymph nodes positive.  Estrogen, progesterone receptor positive, HER-2 negative.     2.  Genetics.  BRCA1 and 2 mutations not detected. 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       THERAPY TO DATE:   1.  2006:  ECOG 2104 protocol of dose-dense Adriamycin, Cytoxan and Avastin x4 cycles followed by Taxol and Avastin x4 cycles.   2.  03/2007:  Completed 1 year Avastin.   3.  11/2006-01/2007:  Radiotherapy to the right breast of 5040 cGy.   4.  03/[id number]:  Aromasin.  Stopped after moving to the [facility].   5.  01/2016:  Right modified radical mastectomy with latissimus dorsi flap reconstruction and a left prophylactic mastectomy with latissimus dorsi flap reconstruction.     She recently had MRI of the brain as a follow-up for multiple sclerosis and there were multiple calvarial lesions noted which was suspicious for metastatic disease.  There was no evidence of new multiple sclerosis lesions.  She had a PET scan on 8/30/2019 which showed widespread bone metastatic lesions (calvarium, spine, sacrum, pelvis, ribs most prominent at C7, T3, L1 and L4), hypermetabolic 3 cm lesion in LLL, and mediastinal/hilar LN. CEA wnl at 1.9 and C27-29 elevated to 415 (28 on 8/23/16). A CT guided biopsy of the right iliac bone was obtained on 9/4/19, pathology consistent with metastatic adenocarcinoma (breast), ER/IA negative, HER-2 negative PDL 1 < 1%. A second biopsy was take of the LLL nodule via EBUS on 9/5/19 did not show any malignancy.    C/T/L spine MRI 8/3/19 to 9/2/19 with numerous enhancing lesions of the C, T and L spine, largest around T9 and L1. No evidence  of cord compression. Has a L1 compression fracture and impending L4.     Received radiation T12-L5 3000 cGy in 10 fractions from 9/6/2019 till 9/18/2019.  Neurosurgery recommended no surgical intervention but to wear Gorge brace when out of bed and HOB >30 degrees    She started palliative Xeloda 2000/1500 on 10/1/2019 but after just a few doses she noticed nausea, red eyes blurred vision, loss of appetite.  She stopped taking it after 5 doses and was seen by nurse practitioner on 10/7/2019 when she was improving.  At that point she was restarted on Xeloda at a lower dose of 1000 mg twice a day.  As she was not able to tolerate even the lower dose with extreme nausea and vomiting and feeling extremely fatigued and blurry vision, we decided on stopping Xeloda.    We decided on repeating scans and MRI brain on 10/25/2019 showed no intracranial metastatic disease.   Multiple enhancing calvarial lesions may be increased in number and are suggestive of metastatic disease. Thinner imaging on the current study may identify the smaller lesions and may be responsible for  identified more lesions.   There is a single focus of T2 hyperintense signal within the anterior right temporal lobe may represent interval demyelination. There is no evidence for active demyelination.    PET/CT on 11/1/2019 showed favorable response to therapy and Overall FDG uptake within scattered osseous metastases is decreased since 8/30/2019, particularly within the pelvis. Increased sclerotic appearance of L1 and L4 pathologic compression deformities, likely sequela of recently completed palliative radiation therapy.    No significant FDG uptake in previously demonstrated hypermetabolic mediastinal lymph nodes. Biopsy negative on 9/5/2019.  There is also decreased FDG uptake in the left lower lobe (biopsy negative for  malignancy), now with dense consolidation containing air bronchograms suggestive of infection versus aspiration.  There is diffuse  FDG uptake within the esophagus, consistent with esophagitis.    Because of intolerance to Xeloda we decided on switching to weekly paclitaxel on 11/5/2019.    C#2 Taxol 12/4/19- started with 70mg/m2 dose reduction    C#3 Taxol 12/30/2019     PET/CT on 1/24/2020 showed progression of the disease in some of the bone lesions including increase in size and lytic lesion within the vertebral body of C4 measuring 1 cm as opposed to 0.6 cm previously and a new central soft tissue nodule with SUV max 4.1 when previously it was non-hypermetabolic.  There is also some progression noted in the right proximal femur and right iliac bone.  There is decreased metabolic uptake in the right lamina of T9 with SUV max 4.7 when previously it was 8.0.  Other lesions are stable.    CA 27-29 also had increased significantly and it was 257 on 1/28/2020.    We decided on switching to eribulin on 1/28/2020.    C#2 2/18/2020  C#2 D#8 2/25/2020    C#3 D#1 3/18/2020 -delayed by 1 week as per patient's preference because she wanted to visit her family.    She had a repeat PET/CT on 3/27/2020 which showed stable size of the bone metastatic lesions although the FDG avidity was less, consistent with treatment response.    She had MRI of the thoracic spine on 4/3/2020 and it was compared to the one which was done in August 2019 and it showed increased size of several metastatic lesions most notably at T9 but also at T5-T7.  Other lesions at T10, T11 and T12 appeared improved.    CA 27-29 was also down to 222 on 3/18/2020.    Cycle #4 eribulin ( dose reduced to 1.2 mg/m2 )-4/7/2020-   Cycle #5 Eribulin ( 1.2 mg/m2 )- 4/28/2020   Cycle #6 Eribulin ( 1.2 mg/m2 )- 5/19/2020     Cycle #7 Eribulin ( 1.2 mg/m2 )- 6/9/2020    Cycle #8 Eribulin ( 1.2 mg/m2 )- 6/30/2020     She had a repeat PET scan on 7/17/2020 which showed incidental finding of new acute bilateral pulmonary embolism in the distal left main pulmonary artery extending in the left upper and  lower lobes and in the segmental and branch arteries in the right lower lobe.    It also showed mildly increased FDG avidity in the lytic lesion in the T9 lamina and right pedicle with SUV max 5.3, previously 3.9.  That is also slightly increased FDG uptake to the left anterior fifth rib at the costochondral junction SUV max 3.9, previously 2.5.  There is slightly decreased FDG uptake in the right femoral neck lesion SUV max 2.2, previously 2.9.  FDG uptake in other bone lesions is fairly stable.  No new metastasis are seen.    CA 27-29 was 308 on 6/30/2020.  It was 286 on 5/19/2020.    Overall this is consistent with only slight progression versus stable disease.    She was started on Lovenox.    Cycle #9 eribulin 7/21/2020. CA 27-29 was 238    C#10 eribulin 8/18/2020. ( delayed by one week for ANC 0.9 )    C#11 Eribulin 9/8/2020    C#12 Eribulin 9/29/2020     C#13 Eribulin 10/20/2020     PET scan on 11/6/2020 shows progression of the disease with increased FDG uptake in the lytic lesions in the T9/T10 and right posterolateral elements as well as increased FDG uptake in the previously known hypermetabolic right iliac bone lesion suggesting recurrence of previously treated metastasis.  There is new subtle lesion in the right manubrium.  There is also a new fracture in the anterolateral left fourth rib with associated uptake and this could be a pathologic fracture.  Healing fracture in the left anterior fifth rib lesion.  There is resolution of the left pulmonary emboli.  Decreased FDG uptake of the esophagus consistent with improving inflammation.    CA 27-29 on 10/20/2020 was also elevated at 489.    C#1 Trodelvy on 11/11/2020.  Cycle #2 Trodelvy 12/2/2020  Cycle number 2-day #8 Trodelvy 12/8/2020.    12/15/2020-12/16/2020.  She was admitted to the hospital with nausea vomiting and dehydration.  She had tachycardia and initial lactic acid of 5.  It decreased to 1.4 after 2 L of normal saline.  She also had  hypokalemia and ANC was 1.3.  She was treated with IV fluids.  Procalcitonin was negative.  CTA of the chest was negative for pulmonary embolism or pneumonia.  No bacterial infection was documented.  Her medication which she was initially unable to tolerate at home, were restarted and she was discharged home once started to feel better.      We delayed the start of cycle number 3 x 1 week and decrease the dose of chemotherapy by 25%.  She also had neutropenia with ANC of 1.0.      She started cycle number 3-day #1 on 12/29/2020.  CA 27-29 was 392.    Cycle number 3-day #8 1/5/2021.    C#4 Trodelvy 1/20/2021- 75% dose    2/5/2021.  PET/CT overall shows a good response to treatment with improvement of previously hypermetabolic lesions in the spine and pelvis and stability of other bone meta stasis.  There is a single lytic lesion in the right iliac bone which demonstrates slight Yimi increased FDG uptake and has SUV max 4.7 while previously it was SUV max 3.4.  There was diffuse wall thickening of the esophagus with uptake in the esophagus in the fundus of the stomach and this could be seen with inflammation.    Trodelvy cycle #5 - 2/10/2021.  75% of the dose.  CA 27-29 was 337.    Trodelvy cycle #6 - 3/3/2021.  75% of the dose.  Trodelvy cycle #6, D#8 - 3/10/2021.  75% of the dose.    Trodelvy C#8, D#8 on 4/21/2021  CA 27-29 stable at 371 on 4/14/2021    Trodelvy, cycle #9- 5/5/2021        On 2/25/2020 when she had biopsy of the right acetabulum and it was consistent with metastatic lobular carcinoma.  Again it was Triple Negative.     On 3/18/2020 when she also met with Dr. Arelis Arshad who also advised testing for androgen receptor studies on the biopsy specimen.  Tumor was diffusely androgen receptor positive.      5/26/2021-cycle #10 Trodelvy started    CA 27-29 was 545.    6/11/2021.  PET/CT shows mildly increased uptake in several bone lesions for example in the left anterior iliac crest, mid right iliac crest and  left ischium.  Uptake in other bone lesions are similar to previously.    Because of minimal progression of the disease and she is tolerating chemotherapy very well, we decided on continuing the same chemotherapy.    6/16/2021-Trodelvy cycle #11    7/7/2021 -Trodelvy cycle #12    9/14/2021-Trodelvy-cycle #15, day #8.    9/8/2021-CA 27-29 was more elevated and it was 1176.    PET/CT on 9/24/2021 showed stable findings with no evidence of FDG avid metastatic disease within the body.  Left axillary lymph nodes are prominent and hypermetabolic and likely from recent vaccinations in the left deltoid muscle.  Healed bony metastasis seems stable.      Cycle #16, Trodelvy 9/28/2021.  Cycle #17, day #8 Trodelvy was on 10/26/2021.  Cycle #18, day #8 Trodelvy on 11/22/2021       She saw Dr. Mejia whom on 10/6/2021.  She was not considered eligible for Enfortumab trial.    Cycle #19, Trodelvy on 12/7/2021 12/21/2021.  PET/CT showed progression of bony metastatic disease.  There is increase hypermetabolism in both the right and left iliac bones and the sternum.  Other bone lesions are is stable.    There is new scattered areas of groundglass throughout both the lungs and these findings are indeterminate but may represent an infectious etiology.  Interval resolution of left axillary FDG avid lymphadenopathy.        Interval history.  This is a video visit.    Overall she has been feeling well.  Pain is under decent control.  Currently taking MS Contin twice daily and very occasionally she has to take morphine sulfate.  She is taking calcium and vitamin D.  No new swellings.  No nausea vomiting diarrhea constipation.  Denies any fevers infections or shortness of breath or coughing.  She was tested negative for Covid on 12/24/2021.    ECOG 1    ROS:  Rest of the comprehensive review of the system was negative.        I reviewed other history in epic as below.       PAST MEDICAL HISTORY:     1.  Breast cancer  2.  Multiple  sclerosis.   3.  Depression.   4.  Migraines.   5.  Hypertension.   6.  Cholecystectomy and umbilical hernia repair.     SOCIAL HISTORY: She smoked for 5 years but quit many years ago.  Drinks alcohol socially.  She lives with her .  She is a teacher in middle school.       FAMILY HISTORY: She mentions that her mother had breast cancer at 49.  Both grandmothers had breast cancer but she is not sure of the age.  A couple of cousins have cancers.  Patient has 3 children who are healthy.    Current Outpatient Medications   Medication     acetaminophen (TYLENOL) 500 MG tablet     albuterol (PROAIR HFA/PROVENTIL HFA/VENTOLIN HFA) 108 (90 Base) MCG/ACT inhaler     busPIRone (BUSPAR) 15 MG tablet     calcium citrate-vitamin D (CITRACAL) 315-250 MG-UNIT TABS     Cholecalciferol (VITAMIN D3 PO)     ELIQUIS ANTICOAGULANT 5 MG tablet     HEMP OIL OR EXTRACT OR OTHER CBD CANNABINOID, NOT MEDICAL CANNABIS,     ibuprofen (ADVIL/MOTRIN) 600 MG tablet     loratadine (CLARITIN) 10 MG tablet     LORazepam (ATIVAN) 1 MG tablet     magnesium 250 MG tablet     morphine (MS CONTIN) 15 MG CR tablet     morphine (MSIR) 15 MG IR tablet     Nerve Stimulator (NERIVIO) CRISTOPHER     OLANZapine (ZYPREXA) 5 MG tablet     potassium chloride ER (KLOR-CON M) 10 MEQ CR tablet     promethazine (PHENERGAN) 25 MG tablet     propranolol ER (INDERAL LA) 80 MG 24 hr capsule     senna-docusate (SENOKOT-S/PERICOLACE) 8.6-50 MG tablet     traZODone (DESYREL) 50 MG tablet     venlafaxine (EFFEXOR-XR) 75 MG 24 hr capsule     No current facility-administered medications for this visit.        Allergies   Allergen Reactions     Bactrim [Sulfamethoxazole W/Trimethoprim]      Internal bleeding     Copaxone [Glatiramer] Hives          PHYSICAL EXAMINATION:     There were no vitals taken for this visit.  Wt Readings from Last 4 Encounters:   12/14/21 64.6 kg (142 lb 6.4 oz)   12/07/21 65.2 kg (143 lb 12.8 oz)   11/22/21 65.5 kg (144 lb 6.4 oz)   11/16/21 64.9 kg  (143 lb)       Constitutional.  Looks well and in no apparent distress.   Eyes.  Without eye redness or apparent jaundice.   Respiratory.  Non labored breathing. Speaking in full sentences.    Skin.  No concerning skin rashes on the skin visualized.   Neurological.  Is alert and oriented.  Psychiatric.  Mood and affect seem appropriate.      The rest of a comprehensive physical examination is deferred due to Public [facility] Emergency video visit restrictions.      Labs and Imaging.    Reviewed.    12/28/2021.  CBC showed WBC 2.5.  ANC 1.4.  Hemoglobin 10.9.  Platelets 159.  CMP shows albumin 3.2.  Phosphorus 5.4.  CA 27-29 continues to increase and it is 1206 on 12/7/2021    CA 27-29 was 940 on 11/16/2021.  CA 27-29 was 1176 on 9/8/2021.  It was 1023 on 10/19/2021.    9/28/2021.      Iron studies, ferritin is 101, iron 51, TIBC 268 and iron saturation index 19%.    CA 27-29 was 465 on 6/16/2021.  It was 545 on 5/26/2021 12/21/2021.  PET/CT showed progression of bony metastatic disease.  There is increase hypermetabolism in both the right and left iliac bones and the sternum.  Other bone lesions are is stable.    There is new scattered areas of groundglass throughout both the lungs and these findings are indeterminate but may represent an infectious etiology.  Interval resolution of left axillary FDG avid lymphadenopathy.      Biopsy from the right acetabulum shows metastatic lobular carcinoma.  It is triple negative.  Androgen receptor was diffusely positive (90%, moderate intensity) by immunohistochemistry. )  PD-L1 negative.    Foundation one showed CDH1 mutation (initially lobular cancer), CCND1 amplification ( equivocal ). FGF3, FGF4, FGF19 amplification ( Equivocal ). Most promising is CCND1 amplification with abemaciclib showing response in 4/10 patients. FGF3,FGF4 and FGF19 are targetable with pan-FGFR inhibitors.           ASSESSMENT AND PLAN:     Now she has metastatic breast cancer negative breast cancer  (androgen receptor positive ) with extensive involvement of the bones.  There is also a left lower lobe hypermetabolic lesion and mediastinal lymph nodes which are hypermetabolic.  The biopsy from the right iliac bone is consistent with metastatic lobular breast cancer but it is hormone receptor and HER-2 negative and PDL 1 is also negative.    Foundation 1 testing did not reveal any actionable mutations.    She was intolerant to Xeloda and progressed on Taxol and eribulin.      We then switched to recently FDA approved sacituzumab govitecan-hziy (Trodelvy) for TNBC, based on the results of the phase II IMMU-132-01 clinical trial.   She started this on 11/11/2020.      We delayed the start of cycle number 3 x 1 week and decrease the dose of chemotherapy by 25% and she also has neutropenia with ANC of 1.  She started cycle number 3-day #1 on 12/29/2020.      After 4 cycles of chemotherapy, overall the PET scan shows positive response to treatment apart from mildly increased FDG avidity in one of the right iliac bone lesions.  CA 27-29 was 454 slightly elevated from before.    We decided on continuing the same chemotherapy.      She obtained a second opinion from Dr. Castillo from [facility] who recommended a repeat biopsy to be done to make sure that she really has triple negative breast cancer.      She also met with Dr. Arelis Arshad on 3/18/2021.      After 10 cycles of Trodelvy, she had PET/CT on 6/11/2021 which showed mildly increased uptake in some of the bone lesions while stability in other bone lesions.      We discussed the situation in detail.      Because of minimal progression of the disease plus the fact that she had been tolerating treatments well, we decided on continuing the same chemotherapy.     After 15 cycles, repeat PET scan showed stable findings.  CA 27-29 has been increasing and it is 1176.      As she was tolerating the chemotherapy well and her quality of life has been decent and  scans were stable although she had a rising CA 27-29, we decided on continuing the same chemotherapy because it has been a very slow progression of the disease.    Now there is clear progression of the disease in the bones.  There is increased FDG uptake in both right and left iliac bones and the sternum.    Foundation one showed CDH1 mutation (initially lobular cancer), CCND1 amplification ( equivocal ). FGF3, FGF4, FGF19 amplification ( Equivocal ). Most promising is CCND1 amplification with abemaciclib showing response in 4/10 patients. FGF3,FGF4 and FGF19 are targetable with pan-FGFR inhibitor    As the tumor is diffusely positive for androgen receptor, we will consider giving her androgen receptor blockers.    I recommend Enzalutamide 160 mg daily ( Enzalutamide for the Treatment of Androgen Receptor-Expressing Triple-Negative Breast Cancer----J Clin Oncol. 2018 Mar 20; 36(9): 884-890. )    We discussed the rational schedule and potential side effects of it in detail.    In the future we can consider Doxil every 4 weeks.     She met with Dr. Mejia to consider clinical trial but at this time she is not eligible for any.      Bone disease.  She has metastatic disease to the bone.  Previously she got palliative radiation to T12-L5 3000 cGy in 10 fractions from 9/6/2019 till 9/18/2019.  She was evaluated by orthopedics Dr. Bipin Elliott on 11/1/2019 and conservative management was recommended.    She was on Zometa every 3 months. She last received on 9/29/2020.  Because of progression of the disease in the bones, we switched to Xgeva which she received on 11/11/2020.  She now has evidence of some progression in the bones.  At this time we will continue with Xgeva.  She last received on 11/22/2021.  Continue calcium and vitamin D.      Cytopenias.  Chemotherapy related.  Continue to observe.     Pain management.  Currently pain is under decent control.  For now continue MS Contin twice daily and morphine  sulfate as needed. On average taking it once a week. Cont to follow with palliative care.      Bilateral pulmonary emboli.  Continue Eliquis for incidentally found PE.         We did not address the following today.  Neuropathy.  This remains mild and stable with neuropathy in her hands.    This is in addition to the chronic balance issues and heat and cold sensitivity from underlying multiple sclerosis which is also stable for years.  Continue to monitor.     Subcutaneous nodule in the scalp.  This looks like an epidermoid cyst.  She thinks it is a stable.  Continue to monitor.       Insomnia.  Continue trazodone.      Wall thickening of esophagus and FDG uptake of fundus of the stomach.  It could be in the setting of inflammation from recent nausea and vomiting.  Symptoms have resolved.  Continue to monitor clinically.    Vision changes.    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  It was thought that this could be due to Taxol as patient reported to the ophthalmologist that she was on Taxol in September 2019 when her symptoms started.  But she was not on Taxol in September 2019 when she started noticing the visual changes so Taxol is not responsible for this.  The first dose of Taxol was on 11/5/2019.   She had left cataract extraction on 2/8/2021 and she has noticed significant improvement in her vision.  She plans to do cataract extraction of the right eye in couple of weeks.      Increased FDG ability in the colon.  She had FDG uptake in the cecum and ascending colon but no corresponding lesion was seen on the CT scan.  She is completely asymptomatic so at this time we will continue to observe.    Discussion regarding healthcare directives.  On previous visit she told me that she will bring the health care directive for our records but she forgot so I told her to bring it on next visit.      Breast implant removal.  She tells me that she was planning to do breast implant  removal because there was a recall on this.  Her surgery was scheduled for 9/24/2019.  Because of this new development of metastatic breast cancer and her recent radiation, surgery has been canceled.  We discussed that at this time treatment for metastatic breast cancer would take precedence over the other surgery as the other surgery would require her to be off chemotherapy for several weeks and in that case the chances of cancer progression would be high and I would not recommend that.    Multiple sclerosis.  She will continue to follow with her neurologist Dr. Quiles.  Currently multiple sclerosis is under control and currently she is not taking any medications.      Return to clinic in 4 weeks.      All of her questions were answered to her satisfaction.  She is agreeable and comfortable with the plan.    Dewayne Zepeda MD    Video start time. 10:58 AM  Video stop time. 11:11 AM    Addendum:  We will give her Casodex 150 mg daily instead of Enzalutamide due to cost issues.  Clinical Trial Clin Cancer Res. 2013 Oct 1;19(19):5505-12.   Phase II trial of bicalutamide in patients with androgen receptor-positive, estrogen receptor-negative metastatic Breast Cancer  Dewayne Zepeda MD  1/5/2022

## 2021-12-28 NOTE — PROGRESS NOTES
Port site scrubbed with Chloraprep for 30 seconds. Accessed port using sterile technique. Pass Christian drawn-Red/Green/Purple tubes. Double signed by patient and RN. See documentation flowsheet. Port needle left accessed for treatment. Tolerated port access and draw without complaint. Lauren Shaw RN on 12/28/2021 at 10:32 AM

## 2021-12-28 NOTE — TELEPHONE ENCOUNTER
Prior Authorization Not Needed per Insurance    Medication: Xtandi 40mg, PA not needed, copay high looking for grants or free drug  Insurance Company: [employer] - Phone [phone] Fax [phone]  Expected CoPay:    $2,899.19  Pharmacy Filling the Rx: [facility] - [place], [place] - [address]  Pharmacy Notified:    Patient Notified:

## 2021-12-28 NOTE — PATIENT INSTRUCTIONS
No chemo today    Xgeva today and monthly    We will start Enzalutamide 160 mg daily    See me back in 4 weeks

## 2021-12-28 NOTE — PROGRESS NOTES
Infusion Nursing Note:  Shaneka Alvarez presents today for Trodelvy and xgeva.  Pt did not receive trodelvy, LPN to give xgeva.    Patient seen by provider today: Yes: Duane, virtual visit    Javier Roland RN

## 2021-12-28 NOTE — TELEPHONE ENCOUNTER
Looking at free drug application from [employer], and also looking at grants that will open in 2022.... called patient to get income info, and was told she needed to talk with her  about how much they make. And asked if I can call back around noon tomorrow 12/29.

## 2021-12-28 NOTE — LETTER
12/28/2021         RE: Shaneka Alvarez  [id number] [facility] [id number]        Dear Colleague,    Thank you for referring your patient, Shaneka Alvarez, to the [facility]. Please see a copy of my visit note below.    ONCOLOGY FOLLOW UP NOTE: 12/28/21        I took the history from reviewing the previous notes that she was following with Dr. Amaya.  I have copied and updated from prior notes.    ONCOLOGY History:    1.  January 2006:  Diagnosed with Stage IIB, T2 N1 M0 invasive lobular carcinoma of the right breast.  Final pathology showed a 4.5 x 3.8 x 2.5 cm, 01/14 lymph nodes positive.  Estrogen, progesterone receptor positive, HER-2 negative.     2.  Genetics.  BRCA1 and 2 mutations not detected. 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       THERAPY TO DATE:   1. 2006:  ECOG 2104 protocol of dose-dense Adriamycin, Cytoxan and Avastin x4 cycles followed by Taxol and Avastin x4 cycles.   2.  03/2007:  Completed 1 year Avastin.   3.  11/2006-01/2007:  Radiotherapy to the right breast of 5040 cGy.   4.  03/[id number]:  Aromasin.  Stopped after moving to the [facility].   5.  01/2016:  Right modified radical mastectomy with latissimus dorsi flap reconstruction and a left prophylactic mastectomy with latissimus dorsi flap reconstruction.     She recently had MRI of the brain as a follow-up for multiple sclerosis and there were multiple calvarial lesions noted which was suspicious for metastatic disease.  There was no evidence of new multiple sclerosis lesions.  She had a PET scan on 8/30/2019 which showed widespread bone metastatic lesions (calvarium, spine, sacrum, pelvis, ribs most prominent at C7, T3, L1 and L4), hypermetabolic 3 cm lesion in LLL, and mediastinal/hilar LN. CEA wnl at 1.9 and C27-29 elevated to 415 (28 on 8/23/16). A CT guided biopsy of the right iliac bone was obtained on 9/4/19, pathology consistent with metastatic adenocarcinoma (breast),  ER/NJ negative, HER-2 negative PDL 1 < 1%. A second biopsy was take of the LLL nodule via EBUS on 9/5/19 did not show any malignancy.    C/T/L spine MRI 8/3/19 to 9/2/19 with numerous enhancing lesions of the C, T and L spine, largest around T9 and L1. No evidence of cord compression. Has a L1 compression fracture and impending L4.     Received radiation T12-L5 3000 cGy in 10 fractions from 9/6/2019 till 9/18/2019.  Neurosurgery recommended no surgical intervention but to wear Troy brace when out of bed and HOB >30 degrees    She started palliative Xeloda 2000/1500 on 10/1/2019 but after just a few doses she noticed nausea, red eyes blurred vision, loss of appetite.  She stopped taking it after 5 doses and was seen by nurse practitioner on 10/7/2019 when she was improving.  At that point she was restarted on Xeloda at a lower dose of 1000 mg twice a day.  As she was not able to tolerate even the lower dose with extreme nausea and vomiting and feeling extremely fatigued and blurry vision, we decided on stopping Xeloda.    We decided on repeating scans and MRI brain on 10/25/2019 showed no intracranial metastatic disease.   Multiple enhancing calvarial lesions may be increased in number and are suggestive of metastatic disease. Thinner imaging on the current study may identify the smaller lesions and may be responsible for  identified more lesions.   There is a single focus of T2 hyperintense signal within the anterior right temporal lobe may represent interval demyelination. There is no evidence for active demyelination.    PET/CT on 11/1/2019 showed favorable response to therapy and Overall FDG uptake within scattered osseous metastases is decreased since 8/30/2019, particularly within the pelvis. Increased sclerotic appearance of L1 and L4 pathologic compression deformities, likely sequela of recently completed palliative radiation therapy.    No significant FDG uptake in previously demonstrated hypermetabolic  mediastinal lymph nodes. Biopsy negative on 9/5/2019.  There is also decreased FDG uptake in the left lower lobe (biopsy negative for  malignancy), now with dense consolidation containing air bronchograms suggestive of infection versus aspiration.  There is diffuse FDG uptake within the esophagus, consistent with esophagitis.    Because of intolerance to Xeloda we decided on switching to weekly paclitaxel on 11/5/2019.    C#2 Taxol 12/4/19- started with 70mg/m2 dose reduction    C#3 Taxol 12/30/2019     PET/CT on 1/24/2020 showed progression of the disease in some of the bone lesions including increase in size and lytic lesion within the vertebral body of C4 measuring 1 cm as opposed to 0.6 cm previously and a new central soft tissue nodule with SUV max 4.1 when previously it was non-hypermetabolic.  There is also some progression noted in the right proximal femur and right iliac bone.  There is decreased metabolic uptake in the right lamina of T9 with SUV max 4.7 when previously it was 8.0.  Other lesions are stable.    CA 27-29 also had increased significantly and it was 257 on 1/28/2020.    We decided on switching to eribulin on 1/28/2020.    C#2 2/18/2020  C#2 D#8 2/25/2020    C#3 D#1 3/18/2020 -delayed by 1 week as per patient's preference because she wanted to visit her family.    She had a repeat PET/CT on 3/27/2020 which showed stable size of the bone metastatic lesions although the FDG avidity was less, consistent with treatment response.    She had MRI of the thoracic spine on 4/3/2020 and it was compared to the one which was done in August 2019 and it showed increased size of several metastatic lesions most notably at T9 but also at T5-T7.  Other lesions at T10, T11 and T12 appeared improved.    CA 27-29 was also down to 222 on 3/18/2020.    Cycle #4 eribulin ( dose reduced to 1.2 mg/m2 )-4/7/2020-   Cycle #5 Eribulin ( 1.2 mg/m2 )- 4/28/2020   Cycle #6 Eribulin ( 1.2 mg/m2 )- 5/19/2020     Cycle #7  Eribulin ( 1.2 mg/m2 )- 6/9/2020    Cycle #8 Eribulin ( 1.2 mg/m2 )- 6/30/2020     She had a repeat PET scan on 7/17/2020 which showed incidental finding of new acute bilateral pulmonary embolism in the distal left main pulmonary artery extending in the left upper and lower lobes and in the segmental and branch arteries in the right lower lobe.    It also showed mildly increased FDG avidity in the lytic lesion in the T9 lamina and right pedicle with SUV max 5.3, previously 3.9.  That is also slightly increased FDG uptake to the left anterior fifth rib at the costochondral junction SUV max 3.9, previously 2.5.  There is slightly decreased FDG uptake in the right femoral neck lesion SUV max 2.2, previously 2.9.  FDG uptake in other bone lesions is fairly stable.  No new metastasis are seen.    CA 27-29 was 308 on 6/30/2020.  It was 286 on 5/19/2020.    Overall this is consistent with only slight progression versus stable disease.    She was started on Lovenox.    Cycle #9 eribulin 7/21/2020. CA 27-29 was 238    C#10 eribulin 8/18/2020. ( delayed by one week for ANC 0.9 )    C#11 Eribulin 9/8/2020    C#12 Eribulin 9/29/2020     C#13 Eribulin 10/20/2020     PET scan on 11/6/2020 shows progression of the disease with increased FDG uptake in the lytic lesions in the T9/T10 and right posterolateral elements as well as increased FDG uptake in the previously known hypermetabolic right iliac bone lesion suggesting recurrence of previously treated metastasis.  There is new subtle lesion in the right manubrium.  There is also a new fracture in the anterolateral left fourth rib with associated uptake and this could be a pathologic fracture.  Healing fracture in the left anterior fifth rib lesion.  There is resolution of the left pulmonary emboli.  Decreased FDG uptake of the esophagus consistent with improving inflammation.    CA 27-29 on 10/20/2020 was also elevated at 489.    C#1 Trodelvy on 11/11/2020.  Cycle #2 Trodelvy  12/2/2020  Cycle number 2-day #8 Trodelvy 12/8/2020.    12/15/2020-12/16/2020.  She was admitted to the hospital with nausea vomiting and dehydration.  She had tachycardia and initial lactic acid of 5.  It decreased to 1.4 after 2 L of normal saline.  She also had hypokalemia and ANC was 1.3.  She was treated with IV fluids.  Procalcitonin was negative.  CTA of the chest was negative for pulmonary embolism or pneumonia.  No bacterial infection was documented.  Her medication which she was initially unable to tolerate at home, were restarted and she was discharged home once started to feel better.      We delayed the start of cycle number 3 x 1 week and decrease the dose of chemotherapy by 25%.  She also had neutropenia with ANC of 1.0.      She started cycle number 3-day #1 on 12/29/2020.  CA 27-29 was 392.    Cycle number 3-day #8 1/5/2021.    C#4 Trodelvy 1/20/2021- 75% dose    2/5/2021.  PET/CT overall shows a good response to treatment with improvement of previously hypermetabolic lesions in the spine and pelvis and stability of other bone meta stasis.  There is a single lytic lesion in the right iliac bone which demonstrates slight Yimi increased FDG uptake and has SUV max 4.7 while previously it was SUV max 3.4.  There was diffuse wall thickening of the esophagus with uptake in the esophagus in the fundus of the stomach and this could be seen with inflammation.    Trodelvy cycle #5 - 2/10/2021.  75% of the dose.  CA 27-29 was 337.    Trodelvy cycle #6 - 3/3/2021.  75% of the dose.  Trodelvy cycle #6, D#8 - 3/10/2021.  75% of the dose.    Trodelvy C#8, D#8 on 4/21/2021  CA 27-29 stable at 371 on 4/14/2021    Trodelvy, cycle #9- 5/5/2021        On 2/25/2020 when she had biopsy of the right acetabulum and it was consistent with metastatic lobular carcinoma.  Again it was Triple Negative.     On 3/18/2020 when she also met with Dr. Arelis Arshad who also advised testing for androgen receptor studies on the biopsy  specimen.  Tumor was diffusely androgen receptor positive.      5/26/2021-cycle #10 Trodelvy started    CA 27-29 was 545.    6/11/2021.  PET/CT shows mildly increased uptake in several bone lesions for example in the left anterior iliac crest, mid right iliac crest and left ischium.  Uptake in other bone lesions are similar to previously.    Because of minimal progression of the disease and she is tolerating chemotherapy very well, we decided on continuing the same chemotherapy.    6/16/2021-Trodelvy cycle #11    7/7/2021 -Trodelvy cycle #12    9/14/2021-Trodelvy-cycle #15, day #8.    9/8/2021-CA 27-29 was more elevated and it was 1176.    PET/CT on 9/24/2021 showed stable findings with no evidence of FDG avid metastatic disease within the body.  Left axillary lymph nodes are prominent and hypermetabolic and likely from recent vaccinations in the left deltoid muscle.  Healed bony metastasis seems stable.      Cycle #16, Trodelvy 9/28/2021.  Cycle #17, day #8 Trodelvy was on 10/26/2021.  Cycle #18, day #8 Trodelvy on 11/22/2021       She saw Dr. Mejia whom on 10/6/2021.  She was not considered eligible for Enfortumab trial.    Cycle #19, Trodelvy on 12/7/2021 12/21/2021.  PET/CT showed progression of bony metastatic disease.  There is increase hypermetabolism in both the right and left iliac bones and the sternum.  Other bone lesions are is stable.    There is new scattered areas of groundglass throughout both the lungs and these findings are indeterminate but may represent an infectious etiology.  Interval resolution of left axillary FDG avid lymphadenopathy.        Interval history.  This is a video visit.    Overall she has been feeling well.  Pain is under decent control.  Currently taking MS Contin twice daily and very occasionally she has to take morphine sulfate.  She is taking calcium and vitamin D.  No new swellings.  No nausea vomiting diarrhea constipation.  Denies any fevers infections or shortness  of breath or coughing.  She was tested negative for Covid on 12/24/2021.    ECOG 1    ROS:  Rest of the comprehensive review of the system was negative.        I reviewed other history in epic as below.       PAST MEDICAL HISTORY:     1.  Breast cancer  2.  Multiple sclerosis.   3.  Depression.   4.  Migraines.   5.  Hypertension.   6.  Cholecystectomy and umbilical hernia repair.     SOCIAL HISTORY: She smoked for 5 years but quit many years ago.  Drinks alcohol socially.  She lives with her .  She is a teacher in middle school.       FAMILY HISTORY: She mentions that her mother had breast cancer at 49.  Both grandmothers had breast cancer but she is not sure of the age.  A couple of cousins have cancers.  Patient has 3 children who are healthy.    Current Outpatient Medications   Medication     acetaminophen (TYLENOL) 500 MG tablet     albuterol (PROAIR HFA/PROVENTIL HFA/VENTOLIN HFA) 108 (90 Base) MCG/ACT inhaler     busPIRone (BUSPAR) 15 MG tablet     calcium citrate-vitamin D (CITRACAL) 315-250 MG-UNIT TABS     Cholecalciferol (VITAMIN D3 PO)     ELIQUIS ANTICOAGULANT 5 MG tablet     HEMP OIL OR EXTRACT OR OTHER CBD CANNABINOID, NOT MEDICAL CANNABIS,     ibuprofen (ADVIL/MOTRIN) 600 MG tablet     loratadine (CLARITIN) 10 MG tablet     LORazepam (ATIVAN) 1 MG tablet     magnesium 250 MG tablet     morphine (MS CONTIN) 15 MG CR tablet     morphine (MSIR) 15 MG IR tablet     Nerve Stimulator (NERIVIO) CRISTOPHER     OLANZapine (ZYPREXA) 5 MG tablet     potassium chloride ER (KLOR-CON M) 10 MEQ CR tablet     promethazine (PHENERGAN) 25 MG tablet     propranolol ER (INDERAL LA) 80 MG 24 hr capsule     senna-docusate (SENOKOT-S/PERICOLACE) 8.6-50 MG tablet     traZODone (DESYREL) 50 MG tablet     venlafaxine (EFFEXOR-XR) 75 MG 24 hr capsule     No current facility-administered medications for this visit.        Allergies   Allergen Reactions     Bactrim [Sulfamethoxazole W/Trimethoprim]      Internal bleeding      Copaxone [Glatiramer] Hives          PHYSICAL EXAMINATION:     There were no vitals taken for this visit.  Wt Readings from Last 4 Encounters:   12/14/21 64.6 kg (142 lb 6.4 oz)   12/07/21 65.2 kg (143 lb 12.8 oz)   11/22/21 65.5 kg (144 lb 6.4 oz)   11/16/21 64.9 kg (143 lb)       Constitutional.  Looks well and in no apparent distress.   Eyes.  Without eye redness or apparent jaundice.   Respiratory.  Non labored breathing. Speaking in full sentences.    Skin.  No concerning skin rashes on the skin visualized.   Neurological.  Is alert and oriented.  Psychiatric.  Mood and affect seem appropriate.      The rest of a comprehensive physical examination is deferred due to Public Health Emergency video visit restrictions.      Labs and Imaging.    Reviewed.    12/28/2021.  CBC showed WBC 2.5.  ANC 1.4.  Hemoglobin 10.9.  Platelets 159.  CMP shows albumin 3.2.  Phosphorus 5.4.  CA 27-29 continues to increase and it is 1206 on 12/7/2021    CA 27-29 was 940 on 11/16/2021.  CA 27-29 was 1176 on 9/8/2021.  It was 1023 on 10/19/2021.    9/28/2021.      Iron studies, ferritin is 101, iron 51, TIBC 268 and iron saturation index 19%.    CA 27-29 was 465 on 6/16/2021.  It was 545 on 5/26/2021 12/21/2021.  PET/CT showed progression of bony metastatic disease.  There is increase hypermetabolism in both the right and left iliac bones and the sternum.  Other bone lesions are is stable.    There is new scattered areas of groundglass throughout both the lungs and these findings are indeterminate but may represent an infectious etiology.  Interval resolution of left axillary FDG avid lymphadenopathy.      Biopsy from the right acetabulum shows metastatic lobular carcinoma.  It is triple negative.  Androgen receptor was diffusely positive (90%, moderate intensity) by immunohistochemistry. )  PD-L1 negative.    Foundation one showed CDH1 mutation (initially lobular cancer), CCND1 amplification ( equivocal ). FGF3, FGF4, FGF19  amplification ( Equivocal ). Most promising is CCND1 amplification with abemaciclib showing response in 4/10 patients. FGF3,FGF4 and FGF19 are targetable with pan-FGFR inhibitors.           ASSESSMENT AND PLAN:     Now she has metastatic breast cancer negative breast cancer (androgen receptor positive ) with extensive involvement of the bones.  There is also a left lower lobe hypermetabolic lesion and mediastinal lymph nodes which are hypermetabolic.  The biopsy from the right iliac bone is consistent with metastatic lobular breast cancer but it is hormone receptor and HER-2 negative and PDL 1 is also negative.    Foundation 1 testing did not reveal any actionable mutations.    She was intolerant to Xeloda and progressed on Taxol and eribulin.      We then switched to recently FDA approved sacituzumab govitecan-hziy (Trodelvy) for TNBC, based on the results of the phase II IMMU-132-01 clinical trial.   She started this on 11/11/2020.      We delayed the start of cycle number 3 x 1 week and decrease the dose of chemotherapy by 25% and she also has neutropenia with ANC of 1.  She started cycle number 3-day #1 on 12/29/2020.      After 4 cycles of chemotherapy, overall the PET scan shows positive response to treatment apart from mildly increased FDG avidity in one of the right iliac bone lesions.  CA 27-29 was 454 slightly elevated from before.    We decided on continuing the same chemotherapy.      She obtained a second opinion from Dr. Castillo from [facility] who recommended a repeat biopsy to be done to make sure that she really has triple negative breast cancer.      She also met with Dr. Arelis Arshad on 3/18/2021.      After 10 cycles of Trodelvy, she had PET/CT on 6/11/2021 which showed mildly increased uptake in some of the bone lesions while stability in other bone lesions.      We discussed the situation in detail.      Because of minimal progression of the disease plus the fact that she had been  tolerating treatments well, we decided on continuing the same chemotherapy.     After 15 cycles, repeat PET scan showed stable findings.  CA 27-29 has been increasing and it is 1176.      As she was tolerating the chemotherapy well and her quality of life has been decent and scans were stable although she had a rising CA 27-29, we decided on continuing the same chemotherapy because it has been a very slow progression of the disease.    Now there is clear progression of the disease in the bones.  There is increased FDG uptake in both right and left iliac bones and the sternum.    Foundation one showed CDH1 mutation (initially lobular cancer), CCND1 amplification ( equivocal ). FGF3, FGF4, FGF19 amplification ( Equivocal ). Most promising is CCND1 amplification with abemaciclib showing response in 4/10 patients. FGF3,FGF4 and FGF19 are targetable with pan-FGFR inhibitor    As the tumor is diffusely positive for androgen receptor, we will consider giving her androgen receptor blockers.    I recommend Enzalutamide 160 mg daily ( Enzalutamide for the Treatment of Androgen Receptor-Expressing Triple-Negative Breast Cancer----J Clin Oncol. 2018 Mar 20; 36(9): 884-890. )    We discussed the rational schedule and potential side effects of it in detail.    In the future we can consider Doxil every 4 weeks.     She met with Dr. Mejia to consider clinical trial but at this time she is not eligible for any.      Bone disease.  She has metastatic disease to the bone.  Previously she got palliative radiation to T12-L5 3000 cGy in 10 fractions from 9/6/2019 till 9/18/2019.  She was evaluated by orthopedics Dr. Bipin Elliott on 11/1/2019 and conservative management was recommended.    She was on Zometa every 3 months. She last received on 9/29/2020.  Because of progression of the disease in the bones, we switched to Xgeva which she received on 11/11/2020.  She now has evidence of some progression in the bones.  At this time  we will continue with Xgeva.  She last received on 11/22/2021.  Continue calcium and vitamin D.      Cytopenias.  Chemotherapy related.  Continue to observe.     Pain management.  Currently pain is under decent control.  For now continue MS Contin twice daily and morphine sulfate as needed. On average taking it once a week. Cont to follow with palliative care.      Bilateral pulmonary emboli.  Continue Eliquis for incidentally found PE.         We did not address the following today.  Neuropathy.  This remains mild and stable with neuropathy in her hands.    This is in addition to the chronic balance issues and heat and cold sensitivity from underlying multiple sclerosis which is also stable for years.  Continue to monitor.     Subcutaneous nodule in the scalp.  This looks like an epidermoid cyst.  She thinks it is a stable.  Continue to monitor.       Insomnia.  Continue trazodone.      Wall thickening of esophagus and FDG uptake of fundus of the stomach.  It could be in the setting of inflammation from recent nausea and vomiting.  Symptoms have resolved.  Continue to monitor clinically.    Vision changes.    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  It was thought that this could be due to Taxol as patient reported to the ophthalmologist that she was on Taxol in September 2019 when her symptoms started.  But she was not on Taxol in September 2019 when she started noticing the visual changes so Taxol is not responsible for this.  The first dose of Taxol was on 11/5/2019.   She had left cataract extraction on 2/8/2021 and she has noticed significant improvement in her vision.  She plans to do cataract extraction of the right eye in couple of weeks.      Increased FDG ability in the colon.  She had FDG uptake in the cecum and ascending colon but no corresponding lesion was seen on the CT scan.  She is completely asymptomatic so at this time we will continue to  observe.    Discussion regarding healthcare directives.  On previous visit she told me that she will bring the health care directive for our records but she forgot so I told her to bring it on next visit.      Breast implant removal.  She tells me that she was planning to do breast implant removal because there was a recall on this.  Her surgery was scheduled for 9/24/2019.  Because of this new development of metastatic breast cancer and her recent radiation, surgery has been canceled.  We discussed that at this time treatment for metastatic breast cancer would take precedence over the other surgery as the other surgery would require her to be off chemotherapy for several weeks and in that case the chances of cancer progression would be high and I would not recommend that.    Multiple sclerosis.  She will continue to follow with her neurologist Dr. Quiles.  Currently multiple sclerosis is under control and currently she is not taking any medications.      Return to clinic in 4 weeks.      All of her questions were answered to her satisfaction.  She is agreeable and comfortable with the plan.    Dewayne Zepeda MD    Video start time. 10:58 AM  Video stop time. 11:11 AM          Again, thank you for allowing me to participate in the care of your patient.        Sincerely,        Dewayne Zepeda MD

## 2021-12-28 NOTE — PROGRESS NOTES
Infusion Nursing Note:  Shaneka Alvarez presents today for Xgeva.    Patient seen by provider today: Yes: Dr. Zepeda   present during visit today: Not Applicable.    Note: n/a    Intravenous Access:  No Intravenous access/labs at this visit.    Treatment Conditions:  Lab Results   Component Value Date     12/28/2021    POTASSIUM 3.7 12/28/2021    MAG 2.0 12/28/2021    CR 0.75 12/28/2021    RAFAELA 9.3 12/28/2021    BILITOTAL 0.2 12/28/2021    ALBUMIN 3.2 (L) 12/28/2021    ALT 22 12/28/2021    AST 15 12/28/2021     Results reviewed, labs MET treatment parameters, ok to proceed with treatment.      Post Infusion Assessment:  Patient tolerated injection without incident.  Site patent and intact, free from redness, edema or discomfort.       Discharge Plan:   Patient discharged in stable condition accompanied by: self.  Departure Mode: Ambulatory.      Yuni Botello LPN

## 2021-12-28 NOTE — PROGRESS NOTES
"Oral Chemotherapy Monitoring Program  New Start    Start Date: ASAP  Expected duration of therapy: Until disease progression or unacceptable toxicity    Reference: https://pubmed.ncbi.nlm.nih.gov/46194040/    Drug Interaction Assessment: Below are relevant interactions. MD informed. Based on patient history, recommended to monitor at this point.      Subjective/Objective:  Shaneka Alvarez is a 61 year old female seen in clinic for an initial visit for oral chemotherapy education.      ORAL CHEMOTHERAPY 10/15/2019 12/28/2021   Assessment Type - Initial Work up   Diagnosis Code - Breast Cancer   Providers - [place]   Clinic Name/Location - [facility]   Drug Name Xeloda (Capecitabine) Xtandi (enzalutamide)   Dose - 160 mg   Current Schedule BID Daily   Cycle Details 2 weeks on 1 week off Continuous   Start Date of Last Cycle 9/30/2019 -   Planned next cycle start date 10/21/2019 -   Doses missed in last 2 weeks 0 -   Adherence Assessment Adherent -   Adverse Effects Diarrhea -   Nausea Grade 2 -   Pharmacist Intervention(nausea) Yes -   Diarrhea Grade 1 -   Pharmacist Intervention(diarrhea) Yes -   Any new drug interactions? No -   Is the dose as ordered appropriate for the patient? No -   Pharmacist intervention for dose adjustment? Yes -   Intervention(s) Dose decreae (chemo) recommended -   Is the patient currently in pain? No -   Has the patient been assessed within the past 6 months for depression? Yes -   Has the patient missed any days of school, work, or other routine activity? No -       Vitals:  BP:   BP Readings from Last 1 Encounters:   12/14/21 106/71     Wt Readings from Last 1 Encounters:   12/14/21 64.6 kg (142 lb 6.4 oz)     Estimated body surface area is 1.68 meters squared as calculated from the following:    Height as of 9/29/21: 1.575 m (5' 2\").    Weight as of 12/14/21: 64.6 kg (142 lb 6.4 oz).      Labs:  Lab Results   Component Value Date     12/28/2021     07/07/2021      Lab Results "   Component Value Date    POTASSIUM 3.7 12/28/2021    POTASSIUM 3.9 07/07/2021     Lab Results   Component Value Date    RAFAELA 9.3 12/28/2021    RAFAELA 9.9 07/07/2021     Lab Results   Component Value Date    MAG 2.0 12/28/2021    MAG 2.1 07/07/2021     Lab Results   Component Value Date    PHOS 5.4 12/28/2021    PHOS 5.2 07/07/2021     Lab Results   Component Value Date    ALBUMIN 3.2 12/28/2021    ALBUMIN 3.3 07/07/2021     Lab Results   Component Value Date    BUN 9 12/28/2021    BUN 15 07/07/2021     Lab Results   Component Value Date    CR 0.75 12/28/2021    CR 0.69 07/07/2021       Lab Results   Component Value Date    AST 15 12/28/2021    AST 21 07/07/2021     Lab Results   Component Value Date    ALT 22 12/28/2021    ALT 33 07/07/2021     Lab Results   Component Value Date    BILITOTAL 0.2 12/28/2021    BILITOTAL 0.4 07/07/2021       Lab Results   Component Value Date    WBC 2.5 12/28/2021    WBC 4.5 07/07/2021     Lab Results   Component Value Date    HGB 10.9 12/28/2021    HGB 12.4 07/07/2021     Lab Results   Component Value Date     12/28/2021     07/07/2021     Lab Results   Component Value Date    ANEU 1.9 10/26/2021    ANEU 3.0 07/07/2021         Assessment/Plan:  Patient is appropriate to start therapy.    Basic chemotherapy teaching was reviewed with the patient including indication, start date of therapy, dose, administration, adverse effects, missed doses, food and drug interactions, monitoring, side effect management, office contact information, and safe handling. Written materials were provided and all questions answered.    Follow-Up:  1 week from start     Samantha Hernandez, [place], BCPS, BCOP

## 2021-12-29 DIAGNOSIS — G43.009 MIGRAINE WITHOUT AURA AND WITHOUT STATUS MIGRAINOSUS, NOT INTRACTABLE: ICD-10-CM

## 2021-12-29 RX ORDER — PROPRANOLOL HYDROCHLORIDE 80 MG/1
CAPSULE, EXTENDED RELEASE ORAL
Qty: 90 CAPSULE | Refills: 1 | OUTPATIENT
Start: 2021-12-29

## 2021-12-29 NOTE — TELEPHONE ENCOUNTER
Shaneka called back with income and thinks she is under the amount that is needed for free drug. Sent application over to Dr. Zepeda to sign and send back to me.

## 2021-12-29 NOTE — TELEPHONE ENCOUNTER
Free Drug Application Initiated  Medication: [employer]  Sponsor: [employer]  Phone # [phone]  Fax # [phone]  Additional Information: faxed application off 12/29

## 2021-12-29 NOTE — PROGRESS NOTES
Chief Complaint(s) and History of Present Illness(es)     Post Op (Ophthalmology) Left Eye     Laterality: both eyes    Onset: gradual    Onset: months ago    Quality: States the va is doing great    Associated symptoms: floaters.  Negative for dryness, redness and tearing      Pain scale: 0/10              Comments     CE/IOL/TORIC  Yusra Mancia COT 10:49 AM March 25, 2021               Review of systems for the eyes was negative other than the pertinent positives/negatives listed in the HPI.      Assessment & Plan      Shaneka Alvarez is a 60 year old female with the following diagnoses:   1. Pseudophakia of both eyes               Patient disposition:   No follow-ups on file.    Cherelle Ashton MD  Ophthalmology Resident, PGY-2     1. Please follow up with your Primary Care Doctor after discharge, Bring a copy of your results to follow up appointment     2. Please rest, stay hydrated and continue all at home medications as previously prescribed    3. You will receive a call for any outstanding results or may call our Covid Line at [phone] to obtain results    4. Please self-isolate at home as directed by [facility] Department of Health     5. Return to ED for any new or worsened symptoms of concern Please return if you have worsening back pain, any chest discomfort, any difficulty breathing or further concerns    Your COVID swab was sent you will receive a call back with the results    1. Please follow up with your Primary Care Doctor after discharge, Bring a copy of your results to follow up appointment     2. Please rest, stay hydrated and continue all at home medications as previously prescribed    3. You will receive a call for any outstanding results or may call our Covid Line at [phone] to obtain results    4. Please self-isolate at home as directed by [facility] Department of Health     5. Return to ED for any new or worsened symptoms of concern Please return if you have worsening back pain, any chest discomfort, any difficulty breathing or further concerns    Your COVID swab is positive. please stay home and take tylenol as needed for fevers    1. Please follow up with your Primary Care Doctor after discharge, Bring a copy of your results to follow up appointment     2. Please rest, stay hydrated and continue all at home medications as previously prescribed    3. You will receive a call for any outstanding results or may call our Covid Line at [phone] to obtain results    4. Please self-isolate at home as directed by [facility] Department of Health     5. Return to ED for any new or worsened symptoms of concern

## 2021-12-30 DIAGNOSIS — G89.3 CANCER ASSOCIATED PAIN: ICD-10-CM

## 2021-12-30 RX ORDER — MORPHINE SULFATE 15 MG/1
15 TABLET, FILM COATED, EXTENDED RELEASE ORAL EVERY 12 HOURS
Qty: 120 TABLET | Refills: 0 | COMMUNITY
Start: 2021-12-30 | End: 2022-01-12

## 2021-12-30 NOTE — TELEPHONE ENCOUNTER
Spoke to Gayla at [employer], application is in and a benefit check is being done as we speak. Should know next week if approved or not.

## 2022-01-01 DIAGNOSIS — I26.99 ACUTE PULMONARY EMBOLISM WITHOUT ACUTE COR PULMONALE, UNSPECIFIED PULMONARY EMBOLISM TYPE (H): ICD-10-CM

## 2022-01-03 ENCOUNTER — MYC MEDICAL ADVICE (OUTPATIENT)
Dept: FAMILY MEDICINE | Facility: OTHER | Age: 62
End: 2022-01-03
Payer: MEDICARE

## 2022-01-03 DIAGNOSIS — G43.009 MIGRAINE WITHOUT AURA AND WITHOUT STATUS MIGRAINOSUS, NOT INTRACTABLE: ICD-10-CM

## 2022-01-03 RX ORDER — PROPRANOLOL HYDROCHLORIDE 80 MG/1
CAPSULE, EXTENDED RELEASE ORAL
Qty: 90 CAPSULE | Refills: 0 | Status: SHIPPED | OUTPATIENT
Start: 2022-01-03 | End: 2022-04-02

## 2022-01-04 RX ORDER — APIXABAN 5 MG/1
TABLET, FILM COATED ORAL
Qty: 60 TABLET | Refills: 3 | Status: SHIPPED | OUTPATIENT
Start: 2022-01-04 | End: 2022-04-25

## 2022-01-04 NOTE — TELEPHONE ENCOUNTER
Requested Prescriptions   Pending Prescriptions Disp Refills    ELIQUIS ANTICOAGULANT 5 MG tablet [Pharmacy Med Name: ELIQUIS 5MG TABLETS] 60 tablet 3     Sig: TAKE 1 TABLET(5 MG) BY MOUTH TWICE DAILY        Direct Oral Anticoagulant Agents Failed - 1/1/2022  3:52 AM        Failed - Weight is greater than 60 kg for the past year       Wt Readings from Last 3 Encounters:   12/14/21 64.6 kg (142 lb 6.4 oz)   12/07/21 65.2 kg (143 lb 12.8 oz)   11/22/21 65.5 kg (144 lb 6.4 oz)             Passed - Normal Platelets on file in past 12 months       Recent Labs   Lab Test 12/28/21  1022                  Passed - Medication is active on med list        Passed - Patient is 18-79 years of age        Passed - Serum creatinine less than or equal to 1.4 on file in past 12 mos     Recent Labs   Lab Test 12/28/21  1022 12/29/16  0946 08/24/16  1206   CR 0.75   < >  --    CREAT  --   --  0.8    < > = values in this interval not displayed.       Ok to refill medication if creatinine is low          Passed - No active pregnancy on record        Passed - No positive pregnancy test within past 12 months        Passed - Recent (6 mo) or future (30 days) visit within the authorizing provider's specialty

## 2022-01-05 ENCOUNTER — PATIENT OUTREACH (OUTPATIENT)
Dept: ONCOLOGY | Facility: CLINIC | Age: 62
End: 2022-01-05
Payer: MEDICARE

## 2022-01-05 DIAGNOSIS — C50.919 METASTATIC BREAST CANCER: Primary | ICD-10-CM

## 2022-01-05 RX ORDER — BICALUTAMIDE 50 MG/1
150 TABLET, FILM COATED ORAL DAILY
Qty: 90 TABLET | Refills: 3 | Status: SHIPPED | OUTPATIENT
Start: 2022-01-05 | End: 2022-04-14

## 2022-01-05 NOTE — TELEPHONE ENCOUNTER
Called and spoke to Erika at [employer], [employer] will only do free drug for approved FDA ICD10 diagnosis so we can't do free drug with her at this time.     Will send messages to the group to see what steps they would like to do next, and contact Shaneka after that.

## 2022-01-05 NOTE — TELEPHONE ENCOUNTER
Prior Authorization Not Needed per Insurance    Medication: Bicalutamide 50mg no pa   Insurance Company: [employer] - Phone [phone] Fax [phone]  Expected CoPay: $15  Pharmacy Filling the Rx: [facility] - [place], [place] - [address]   Pharmacy Notified:    Patient Notified:    .

## 2022-01-06 ENCOUNTER — TELEPHONE (OUTPATIENT)
Dept: PALLIATIVE CARE | Facility: CLINIC | Age: 62
End: 2022-01-06
Payer: MEDICARE

## 2022-01-06 NOTE — TELEPHONE ENCOUNTER
Phone call placed to pt after receiving message from oncology that pain was uncontrolled yesterday. Spoke with pt this morning and she states she is doing much better. Reviewed her current pain medications; she continues to take MS Contin twice daily. She takes MS IR only once/wk on average. Reviewed the PRN instructions with her and discussed using it more frequently on days when pain is worse. She verbalized understanding.     Will be in touch with pt next week after discussing plan to change pain medication to buprenorphine with Dr. Whitaker.    HAKAN NorthN, RN  Palliative Care Nurse Clinician    [phone] (Direct)  [phone] (Refills)  [phone] (Appointment Scheduling)

## 2022-01-12 DIAGNOSIS — G89.3 CANCER ASSOCIATED PAIN: ICD-10-CM

## 2022-01-12 DIAGNOSIS — C79.51 BONE METASTASES: Primary | ICD-10-CM

## 2022-01-12 RX ORDER — BUPRENORPHINE 5 UG/H
1 PATCH TRANSDERMAL
Qty: 4 PATCH | Refills: 0 | Status: SHIPPED | OUTPATIENT
Start: 2022-01-12 | End: 2022-02-02

## 2022-01-21 DIAGNOSIS — C50.919 METASTATIC BREAST CANCER: ICD-10-CM

## 2022-01-21 DIAGNOSIS — G89.3 CANCER ASSOCIATED PAIN: ICD-10-CM

## 2022-01-21 RX ORDER — MORPHINE SULFATE 15 MG/1
15-30 TABLET ORAL
Qty: 60 TABLET | Refills: 0 | Status: SHIPPED | OUTPATIENT
Start: 2022-01-21 | End: 2022-03-30

## 2022-01-21 NOTE — TELEPHONE ENCOUNTER
Received [employer] message from patient requesting refill of MS IR.     Last refill: 11/10/21  Last office visit: 12/22/21  Scheduled for follow up 2/9/22     Will route request to MD for review.     Reviewed [place]  Report.    Pt placed her first butrans patch 1 week ago and reports it has been helpful in managing her pain. She has only needed to take 1 tablet of MSIR for breakthrough pain.

## 2022-01-23 ENCOUNTER — HEALTH MAINTENANCE LETTER (OUTPATIENT)
Age: 62
End: 2022-01-23

## 2022-01-26 ENCOUNTER — LAB (OUTPATIENT)
Dept: ONCOLOGY | Facility: CLINIC | Age: 62
End: 2022-01-26
Payer: MEDICARE

## 2022-01-26 ENCOUNTER — VIRTUAL VISIT (OUTPATIENT)
Dept: ONCOLOGY | Facility: CLINIC | Age: 62
End: 2022-01-26
Attending: INTERNAL MEDICINE
Payer: MEDICARE

## 2022-01-26 ENCOUNTER — INFUSION THERAPY VISIT (OUTPATIENT)
Dept: INFUSION THERAPY | Facility: CLINIC | Age: 62
End: 2022-01-26
Payer: MEDICARE

## 2022-01-26 VITALS
SYSTOLIC BLOOD PRESSURE: 118 MMHG | OXYGEN SATURATION: 98 % | DIASTOLIC BLOOD PRESSURE: 84 MMHG | HEART RATE: 85 BPM | RESPIRATION RATE: 16 BRPM | TEMPERATURE: 98.7 F

## 2022-01-26 DIAGNOSIS — C50.919 METASTATIC BREAST CANCER: ICD-10-CM

## 2022-01-26 DIAGNOSIS — C79.51 BONE METASTASES: Primary | ICD-10-CM

## 2022-01-26 DIAGNOSIS — C79.51 BONE METASTASES: ICD-10-CM

## 2022-01-26 DIAGNOSIS — C50.919 METASTATIC BREAST CANCER: Primary | ICD-10-CM

## 2022-01-26 LAB
ALBUMIN SERPL-MCNC: 3.6 G/DL (ref 3.4–5)
CALCIUM SERPL-MCNC: 8.7 MG/DL (ref 8.5–10.1)
CREAT SERPL-MCNC: 0.7 MG/DL (ref 0.52–1.04)
GFR SERPL CREATININE-BSD FRML MDRD: >90 ML/MIN/1.73M2

## 2022-01-26 PROCEDURE — 82310 ASSAY OF CALCIUM: CPT | Performed by: NURSE PRACTITIONER

## 2022-01-26 PROCEDURE — 99207 PR NO CHARGE LOS: CPT

## 2022-01-26 PROCEDURE — 96372 THER/PROPH/DIAG INJ SC/IM: CPT | Performed by: NURSE PRACTITIONER

## 2022-01-26 PROCEDURE — 82565 ASSAY OF CREATININE: CPT | Performed by: NURSE PRACTITIONER

## 2022-01-26 PROCEDURE — 82040 ASSAY OF SERUM ALBUMIN: CPT | Performed by: NURSE PRACTITIONER

## 2022-01-26 PROCEDURE — 99215 OFFICE O/P EST HI 40 MIN: CPT | Mod: 95 | Performed by: INTERNAL MEDICINE

## 2022-01-26 PROCEDURE — 99207 PR NO CHARGE NURSE ONLY: CPT

## 2022-01-26 RX ORDER — HEPARIN SODIUM (PORCINE) LOCK FLUSH IV SOLN 100 UNIT/ML 100 UNIT/ML
5 SOLUTION INTRAVENOUS
Status: DISCONTINUED | OUTPATIENT
Start: 2022-01-26 | End: 2022-01-26 | Stop reason: HOSPADM

## 2022-01-26 RX ORDER — HEPARIN SODIUM,PORCINE 10 UNIT/ML
5 VIAL (ML) INTRAVENOUS
Status: CANCELLED | OUTPATIENT
Start: 2022-01-26

## 2022-01-26 RX ORDER — HEPARIN SODIUM (PORCINE) LOCK FLUSH IV SOLN 100 UNIT/ML 100 UNIT/ML
5 SOLUTION INTRAVENOUS
Status: CANCELLED | OUTPATIENT
Start: 2022-01-26

## 2022-01-26 RX ADMIN — HEPARIN SODIUM (PORCINE) LOCK FLUSH IV SOLN 100 UNIT/ML 5 ML: 100 SOLUTION at 15:43

## 2022-01-26 NOTE — PROGRESS NOTES
ONCOLOGY FOLLOW UP NOTE: 1/26/22        I took the history from reviewing the previous notes that she was following with Dr. Amaya.  I have copied and updated from prior notes.    ONCOLOGY History:    1.  January 2006:  Diagnosed with Stage IIB, T2 N1 M0 invasive lobular carcinoma of the right breast.  Final pathology showed a 4.5 x 3.8 x 2.5 cm, 01/14 lymph nodes positive.  Estrogen, progesterone receptor positive, HER-2 negative.     2.  Genetics.  BRCA1 and 2 mutations not detected. 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       THERAPY TO DATE:   1. 2006:  ECOG 2104 protocol of dose-dense Adriamycin, Cytoxan and Avastin x4 cycles followed by Taxol and Avastin x4 cycles.   2.  03/2007:  Completed 1 year Avastin.   3.  11/2006-01/2007:  Radiotherapy to the right breast of 5040 cGy.   4.  03/[id number]:  Aromasin.  Stopped after moving to the [facility].   5.  01/2016:  Right modified radical mastectomy with latissimus dorsi flap reconstruction and a left prophylactic mastectomy with latissimus dorsi flap reconstruction.     She recently had MRI of the brain as a follow-up for multiple sclerosis and there were multiple calvarial lesions noted which was suspicious for metastatic disease.  There was no evidence of new multiple sclerosis lesions.  She had a PET scan on 8/30/2019 which showed widespread bone metastatic lesions (calvarium, spine, sacrum, pelvis, ribs most prominent at C7, T3, L1 and L4), hypermetabolic 3 cm lesion in LLL, and mediastinal/hilar LN. CEA wnl at 1.9 and C27-29 elevated to 415 (28 on 8/23/16). A CT guided biopsy of the right iliac bone was obtained on 9/4/19, pathology consistent with metastatic adenocarcinoma (breast), ER/OK negative, HER-2 negative PDL 1 < 1%. A second biopsy was take of the LLL nodule via EBUS on 9/5/19 did not show any malignancy.    C/T/L spine MRI 8/3/19 to 9/2/19 with numerous enhancing lesions of the C, T and L spine, largest around T9 and L1. No evidence of  cord compression. Has a L1 compression fracture and impending L4.     Received radiation T12-L5 3000 cGy in 10 fractions from 9/6/2019 till 9/18/2019.  Neurosurgery recommended no surgical intervention but to wear Gorge brace when out of bed and HOB >30 degrees    She started palliative Xeloda 2000/1500 on 10/1/2019 but after just a few doses she noticed nausea, red eyes blurred vision, loss of appetite.  She stopped taking it after 5 doses and was seen by nurse practitioner on 10/7/2019 when she was improving.  At that point she was restarted on Xeloda at a lower dose of 1000 mg twice a day.  As she was not able to tolerate even the lower dose with extreme nausea and vomiting and feeling extremely fatigued and blurry vision, we decided on stopping Xeloda.    We decided on repeating scans and MRI brain on 10/25/2019 showed no intracranial metastatic disease.   Multiple enhancing calvarial lesions may be increased in number and are suggestive of metastatic disease. Thinner imaging on the current study may identify the smaller lesions and may be responsible for  identified more lesions.   There is a single focus of T2 hyperintense signal within the anterior right temporal lobe may represent interval demyelination. There is no evidence for active demyelination.    PET/CT on 11/1/2019 showed favorable response to therapy and Overall FDG uptake within scattered osseous metastases is decreased since 8/30/2019, particularly within the pelvis. Increased sclerotic appearance of L1 and L4 pathologic compression deformities, likely sequela of recently completed palliative radiation therapy.    No significant FDG uptake in previously demonstrated hypermetabolic mediastinal lymph nodes. Biopsy negative on 9/5/2019.  There is also decreased FDG uptake in the left lower lobe (biopsy negative for  malignancy), now with dense consolidation containing air bronchograms suggestive of infection versus aspiration.  There is diffuse FDG  uptake within the esophagus, consistent with esophagitis.    Because of intolerance to Xeloda we decided on switching to weekly paclitaxel on 11/5/2019.    C#2 Taxol 12/4/19- started with 70mg/m2 dose reduction    C#3 Taxol 12/30/2019     PET/CT on 1/24/2020 showed progression of the disease in some of the bone lesions including increase in size and lytic lesion within the vertebral body of C4 measuring 1 cm as opposed to 0.6 cm previously and a new central soft tissue nodule with SUV max 4.1 when previously it was non-hypermetabolic.  There is also some progression noted in the right proximal femur and right iliac bone.  There is decreased metabolic uptake in the right lamina of T9 with SUV max 4.7 when previously it was 8.0.  Other lesions are stable.    CA 27-29 also had increased significantly and it was 257 on 1/28/2020.    We decided on switching to eribulin on 1/28/2020.    C#2 2/18/2020  C#2 D#8 2/25/2020    C#3 D#1 3/18/2020 -delayed by 1 week as per patient's preference because she wanted to visit her family.    She had a repeat PET/CT on 3/27/2020 which showed stable size of the bone metastatic lesions although the FDG avidity was less, consistent with treatment response.    She had MRI of the thoracic spine on 4/3/2020 and it was compared to the one which was done in August 2019 and it showed increased size of several metastatic lesions most notably at T9 but also at T5-T7.  Other lesions at T10, T11 and T12 appeared improved.    CA 27-29 was also down to 222 on 3/18/2020.    Cycle #4 eribulin ( dose reduced to 1.2 mg/m2 )-4/7/2020-   Cycle #5 Eribulin ( 1.2 mg/m2 )- 4/28/2020   Cycle #6 Eribulin ( 1.2 mg/m2 )- 5/19/2020     Cycle #7 Eribulin ( 1.2 mg/m2 )- 6/9/2020    Cycle #8 Eribulin ( 1.2 mg/m2 )- 6/30/2020     She had a repeat PET scan on 7/17/2020 which showed incidental finding of new acute bilateral pulmonary embolism in the distal left main pulmonary artery extending in the left upper and lower  lobes and in the segmental and branch arteries in the right lower lobe.    It also showed mildly increased FDG avidity in the lytic lesion in the T9 lamina and right pedicle with SUV max 5.3, previously 3.9.  That is also slightly increased FDG uptake to the left anterior fifth rib at the costochondral junction SUV max 3.9, previously 2.5.  There is slightly decreased FDG uptake in the right femoral neck lesion SUV max 2.2, previously 2.9.  FDG uptake in other bone lesions is fairly stable.  No new metastasis are seen.    CA 27-29 was 308 on 6/30/2020.  It was 286 on 5/19/2020.    Overall this is consistent with only slight progression versus stable disease.    She was started on Lovenox.    Cycle #9 eribulin 7/21/2020. CA 27-29 was 238    C#10 eribulin 8/18/2020. ( delayed by one week for ANC 0.9 )    C#11 Eribulin 9/8/2020    C#12 Eribulin 9/29/2020     C#13 Eribulin 10/20/2020     PET scan on 11/6/2020 shows progression of the disease with increased FDG uptake in the lytic lesions in the T9/T10 and right posterolateral elements as well as increased FDG uptake in the previously known hypermetabolic right iliac bone lesion suggesting recurrence of previously treated metastasis.  There is new subtle lesion in the right manubrium.  There is also a new fracture in the anterolateral left fourth rib with associated uptake and this could be a pathologic fracture.  Healing fracture in the left anterior fifth rib lesion.  There is resolution of the left pulmonary emboli.  Decreased FDG uptake of the esophagus consistent with improving inflammation.    CA 27-29 on 10/20/2020 was also elevated at 489.    C#1 Trodelvy on 11/11/2020.  Cycle #2 Trodelvy 12/2/2020  Cycle number 2-day #8 Trodelvy 12/8/2020.    12/15/2020-12/16/2020.  She was admitted to the hospital with nausea vomiting and dehydration.  She had tachycardia and initial lactic acid of 5.  It decreased to 1.4 after 2 L of normal saline.  She also had hypokalemia  and ANC was 1.3.  She was treated with IV fluids.  Procalcitonin was negative.  CTA of the chest was negative for pulmonary embolism or pneumonia.  No bacterial infection was documented.  Her medication which she was initially unable to tolerate at home, were restarted and she was discharged home once started to feel better.      We delayed the start of cycle number 3 x 1 week and decrease the dose of chemotherapy by 25%.  She also had neutropenia with ANC of 1.0.      She started cycle number 3-day #1 on 12/29/2020.  CA 27-29 was 392.    Cycle number 3-day #8 1/5/2021.    C#4 Trodelvy 1/20/2021- 75% dose    2/5/2021.  PET/CT overall shows a good response to treatment with improvement of previously hypermetabolic lesions in the spine and pelvis and stability of other bone meta stasis.  There is a single lytic lesion in the right iliac bone which demonstrates slight Yimi increased FDG uptake and has SUV max 4.7 while previously it was SUV max 3.4.  There was diffuse wall thickening of the esophagus with uptake in the esophagus in the fundus of the stomach and this could be seen with inflammation.    Trodelvy cycle #5 - 2/10/2021.  75% of the dose.  CA 27-29 was 337.    Trodelvy cycle #6 - 3/3/2021.  75% of the dose.  Trodelvy cycle #6, D#8 - 3/10/2021.  75% of the dose.    Trodelvy C#8, D#8 on 4/21/2021  CA 27-29 stable at 371 on 4/14/2021    Trodelvy, cycle #9- 5/5/2021        On 2/25/2020 when she had biopsy of the right acetabulum and it was consistent with metastatic lobular carcinoma.  Again it was Triple Negative.     On 3/18/2020 when she also met with Dr. Arelis Arshad who also advised testing for androgen receptor studies on the biopsy specimen.  Tumor was diffusely androgen receptor positive.      5/26/2021-cycle #10 Trodelvy started    CA 27-29 was 545.    6/11/2021.  PET/CT shows mildly increased uptake in several bone lesions for example in the left anterior iliac crest, mid right iliac crest and left  ischium.  Uptake in other bone lesions are similar to previously.    Because of minimal progression of the disease and she is tolerating chemotherapy very well, we decided on continuing the same chemotherapy.    6/16/2021-Trodelvy cycle #11    7/7/2021 -Trodelvy cycle #12    9/14/2021-Trodelvy-cycle #15, day #8.    9/8/2021-CA 27-29 was more elevated and it was 1176.    PET/CT on 9/24/2021 showed stable findings with no evidence of FDG avid metastatic disease within the body.  Left axillary lymph nodes are prominent and hypermetabolic and likely from recent vaccinations in the left deltoid muscle.  Healed bony metastasis seems stable.      Cycle #16, Trodelvy 9/28/2021.  Cycle #17, day #8 Trodelvy was on 10/26/2021.  Cycle #18, day #8 Trodelvy on 11/22/2021       She saw Dr. Mejia whom on 10/6/2021.  She was not considered eligible for Enfortumab trial.    Cycle #19, Trodelvy on 12/7/2021 12/21/2021.  PET/CT showed progression of bony metastatic disease.  There is increase hypermetabolism in both the right and left iliac bones and the sternum.  Other bone lesions are stable.    There is new scattered areas of groundglass throughout both the lungs and these findings are indeterminate but may represent an infectious etiology.  Interval resolution of left axillary FDG avid lymphadenopathy.    She was started on Casodex 150 mg daily on 1/6/2022.    Interval history.  This is a video visit.    Overall doing well and tolerating Casodex with some dry mouth and poor appetite. She has lost a few lbs over the last one month.  She has been switched to Buprenorphine patch. She takes occasional MSIR.  No nausea or vomiting. Had some constipation and senna helps. No new pain or new swellings. Overall feels the same energy.  No SOB. No infections. Neuropathy remains mild and stable.     ECOG 1    ROS:  Rest of the comprehensive review of the system was negative.        I reviewed other history in epic as below.       PAST  MEDICAL HISTORY:     1.  Breast cancer  2.  Multiple sclerosis.   3.  Depression.   4.  Migraines.   5.  Hypertension.   6.  Cholecystectomy and umbilical hernia repair.     SOCIAL HISTORY: She smoked for 5 years but quit many years ago.  Drinks alcohol socially.  She lives with her .  She is a teacher in middle school.       FAMILY HISTORY: She mentions that her mother had breast cancer at 49.  Both grandmothers had breast cancer but she is not sure of the age.  A couple of cousins have cancers.  Patient has 3 children who are healthy.    Current Outpatient Medications   Medication     acetaminophen (TYLENOL) 500 MG tablet     albuterol (PROAIR HFA/PROVENTIL HFA/VENTOLIN HFA) 108 (90 Base) MCG/ACT inhaler     bicalutamide (CASODEX) 50 MG tablet     buprenorphine (BUTRANS) 5 MCG/HR WK patch     busPIRone (BUSPAR) 15 MG tablet     calcium citrate-vitamin D (CITRACAL) 315-250 MG-UNIT TABS     Cholecalciferol (VITAMIN D3 PO)     ELIQUIS ANTICOAGULANT 5 MG tablet     HEMP OIL OR EXTRACT OR OTHER CBD CANNABINOID, NOT MEDICAL CANNABIS,     ibuprofen (ADVIL/MOTRIN) 600 MG tablet     loratadine (CLARITIN) 10 MG tablet     LORazepam (ATIVAN) 1 MG tablet     magnesium 250 MG tablet     morphine (MSIR) 15 MG IR tablet     Nerve Stimulator (NERIVIO) CRISTOPHER     OLANZapine (ZYPREXA) 5 MG tablet     potassium chloride ER (KLOR-CON M) 10 MEQ CR tablet     promethazine (PHENERGAN) 25 MG tablet     propranolol ER (INDERAL LA) 80 MG 24 hr capsule     senna-docusate (SENOKOT-S/PERICOLACE) 8.6-50 MG tablet     traZODone (DESYREL) 50 MG tablet     venlafaxine (EFFEXOR-XR) 75 MG 24 hr capsule     No current facility-administered medications for this visit.        Allergies   Allergen Reactions     Bactrim [Sulfamethoxazole W/Trimethoprim]      Internal bleeding     Copaxone [Glatiramer] Hives          PHYSICAL EXAMINATION:     There were no vitals taken for this visit.  Wt Readings from Last 4 Encounters:   12/14/21 64.6 kg (142 lb  6.4 oz)   12/07/21 65.2 kg (143 lb 12.8 oz)   11/22/21 65.5 kg (144 lb 6.4 oz)   11/16/21 64.9 kg (143 lb)           Constitutional.  Does not seem to be in any acute distress.  Eyes.  No redness or discharge noted.  Respiratory.  Speaking in full sentences.  Breathing seems comfortable without any accessory use of muscles.    Skin.  Visualized his skin does not show any obvious rashes.  Musculoskeletal.  Range of motion for visualized areas is intact.  Neurological.  Alert and oriented x3.  Psychiatric.  Mood, mentation and affect are normal.  Decision making capacity is intact.      The rest of a comprehensive physical examination is deferred due to Public Health Emergency video visit restrictions.        Labs and Imaging.    Reviewed.    12/28/2021.  CBC showed WBC 2.5.  ANC 1.4.  Hemoglobin 10.9.  Platelets 159.  CMP shows albumin 3.2.  Phosphorus 5.4.    CA 27-29 was 1077  CA 27-29 continues to increase and it is 1206 on 12/7/2021    CA 27-29 was 940 on 11/16/2021.  CA 27-29 was 1176 on 9/8/2021.  It was 1023 on 10/19/2021.    9/28/2021.      Iron studies, ferritin is 101, iron 51, TIBC 268 and iron saturation index 19%.    CA 27-29 was 465 on 6/16/2021.  It was 545 on 5/26/2021 12/21/2021.  PET/CT showed progression of bony metastatic disease.  There is increase hypermetabolism in both the right and left iliac bones and the sternum.  Other bone lesions are is stable.    There is new scattered areas of groundglass throughout both the lungs and these findings are indeterminate but may represent an infectious etiology.  Interval resolution of left axillary FDG avid lymphadenopathy.      Biopsy from the right acetabulum shows metastatic lobular carcinoma.  It is triple negative.  Androgen receptor was diffusely positive (90%, moderate intensity) by immunohistochemistry. )  PD-L1 negative.    Foundation one showed CDH1 mutation (initially lobular cancer), CCND1 amplification ( equivocal ). FGF3, FGF4, FGF19  amplification ( Equivocal ). Most promising is CCND1 amplification with abemaciclib showing response in 4/10 patients. FGF3,FGF4 and FGF19 are targetable with pan-FGFR inhibitors.           ASSESSMENT AND PLAN:     Now she has metastatic breast cancer negative breast cancer (androgen receptor positive ) with extensive involvement of the bones.  There is also a left lower lobe hypermetabolic lesion and mediastinal lymph nodes which are hypermetabolic.  The biopsy from the right iliac bone is consistent with metastatic lobular breast cancer but it is hormone receptor and HER-2 negative and PDL 1 is also negative.    Foundation 1 testing did not reveal any actionable mutations.    She was intolerant to Xeloda and progressed on Taxol and eribulin.      We then switched to recently FDA approved sacituzumab govitecan-hziy (Trodelvy) for TNBC, based on the results of the phase II IMMU-132-01 clinical trial.   She started this on 11/11/2020.      We delayed the start of cycle number 3 x 1 week and decrease the dose of chemotherapy by 25% and she also has neutropenia with ANC of 1.  She started cycle number 3-day #1 on 12/29/2020.      After 4 cycles of chemotherapy, overall the PET scan shows positive response to treatment apart from mildly increased FDG avidity in one of the right iliac bone lesions.  CA 27-29 was 454 slightly elevated from before.    We decided on continuing the same chemotherapy.      [facility] recommended a repeat biopsy to be done to make sure that she really has triple negative breast cancer.      She also met with Dr. Arelis Arshad on 3/18/2021.      After 10 cycles of Trodelvy, she had PET/CT on 6/11/2021 which showed mildly increased uptake in some of the bone lesions while stability in other bone lesions.      We discussed the situation in detail.      Because of minimal progression of the disease plus the fact that she had been  tolerating treatments well, we decided on continuing the same chemotherapy.     After 15 cycles, repeat PET scan showed stable findings.  CA 27-29 has been increasing and it is 1176.      As she was tolerating the chemotherapy well and her quality of life has been decent and scans were stable although she had a rising CA 27-29, we decided on continuing the same chemotherapy because it has been a very slow progression of the disease.    Now there is clear progression of the disease in the bones.  There is increased FDG uptake in both right and left iliac bones and the sternum.    Foundation one showed CDH1 mutation (initially lobular cancer), CCND1 amplification ( equivocal ). FGF3, FGF4, FGF19 amplification ( Equivocal ). Most promising is CCND1 amplification with abemaciclib showing response in 4/10 patients. FGF3,FGF4 and FGF19 are targetable with pan-FGFR inhibitor    As the tumor is diffusely positive for androgen receptor, we we decided to start her on androgen receptor blockers.    I initially recommended enzalutamide 160 mg daily ( Enzalutamide for the Treatment of Androgen Receptor-Expressing Triple-Negative Breast Cancer----J Clin Oncol. 2018 Mar 20; 36(9): 884-890. ).    Eventually we decided to start her on Casodex 150 mg daily instead of Enzalutamide due to cost issues.  Clinical Trial Clin Cancer Res. 2013 Oct 1;19(19):5504-12.   Phase II trial of bicalutamide in patients with androgen receptor-positive, estrogen receptor-negative metastatic Breast Cancer      In the future we can consider Doxil every 4 weeks.     She met with Dr. Mejia to consider clinical trial but at this time she is not eligible for any trials.    Overall she is tolerating Casodex well. We will continue the same. In about a month we will repeat labs including CA 27-29.      Bone disease.  She has metastatic disease to the bone.  Previously she got palliative radiation to T12-L5 3000 cGy in 10 fractions from 9/6/2019 till  9/18/2019.  She was evaluated by orthopedics Dr. Bipin Elliott on 11/1/2019 and conservative management was recommended.    She was on Zometa every 3 months. She last received on 9/29/2020.  Because of progression of the disease in the bones, we switched to Xgeva which she received on 11/11/2020.  She now has evidence of some progression in the bones.  At this time we will continue with Xgeva.  She last received on 12/28/2021. She will get it today.  Continue calcium and vitamin D.      Cytopenias.  Chemotherapy related. Repeat labs in about a month    Dry mouth. Advised her to keep herself well-hydrated. I also advised her to keep humidifier by her bedside when she is sleeping.    Poor appetite. Weight loss. Advised her to increase protein in her diet. She should drink 2-3 Ensure/boost every day. I will also have her see nutritionist.    Pain management. Now on buprenorphine patch. I reviewed notes from Dr. Whitaker. She takes morphine sulfate immediate release very infrequently as needed. She will follow with palliative care.     Constipation. Continue senna.    Bilateral pulmonary emboli.  Continue Eliquis for incidentally found PE.         We did not address the following today.  Neuropathy.  This remains mild and stable with neuropathy in her hands.    This is in addition to the chronic balance issues and heat and cold sensitivity from underlying multiple sclerosis which is also stable for years.  Continue to monitor.     Subcutaneous nodule in the scalp.  This looks like an epidermoid cyst.  She thinks it is a stable.  Continue to monitor.       Insomnia.  Continue trazodone.      Wall thickening of esophagus and FDG uptake of fundus of the stomach.  It could be in the setting of inflammation from recent nausea and vomiting.  Symptoms have resolved.  Continue to monitor clinically.    Vision changes.    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  It was thought that this  could be due to Taxol as patient reported to the ophthalmologist that she was on Taxol in September 2019 when her symptoms started.  But she was not on Taxol in September 2019 when she started noticing the visual changes so Taxol is not responsible for this.  The first dose of Taxol was on 11/5/2019.   She had left cataract extraction on 2/8/2021 and she has noticed significant improvement in her vision.  She plans to do cataract extraction of the right eye in couple of weeks.      Increased FDG ability in the colon.  She had FDG uptake in the cecum and ascending colon but no corresponding lesion was seen on the CT scan.  She is completely asymptomatic so at this time we will continue to observe.    Discussion regarding healthcare directives.  On previous visit she told me that she will bring the health care directive for our records but she forgot so I told her to bring it on next visit.      Breast implant removal.  She tells me that she was planning to do breast implant removal because there was a recall on this.  Her surgery was scheduled for 9/24/2019.  Because of this new development of metastatic breast cancer and her recent radiation, surgery has been canceled.  We discussed that at this time treatment for metastatic breast cancer would take precedence over the other surgery as the other surgery would require her to be off chemotherapy for several weeks and in that case the chances of cancer progression would be high and I would not recommend that.    Multiple sclerosis.  She will continue to follow with her neurologist Dr. Quiles.  Currently multiple sclerosis is under control and currently she is not taking any medications.    See me back in about 1 month.      All of her questions were answered to her satisfaction.  She is agreeable and comfortable with the plan.    Dewayne Zepeda MD

## 2022-01-26 NOTE — PROGRESS NOTES
Shaneka is a 61 year old who is being evaluated via a billable video visit.      How would you like to obtain your AVS? MyChart  If the video visit is dropped, the invitation should be resent by: Text to cell phone: [phone]  Will anyone else be joining your video visit? Claudia JOE      Video Start Time: 8:09 AM  Video-Visit Details    Type of service:  Video Visit    Video End Time:8:25 AM    Originating Location (pt. Location): Home    Distant Location (provider location):  [facility]     Platform used for Video Visit: [employer]

## 2022-01-26 NOTE — LETTER
1/26/2022         RE: Shaneka Alvarez  [id number] [facility] [address]        Dear Colleague,    Thank you for referring your patient, Shaneka Alvarez, to the [facility]. Please see a copy of my visit note below.    Shaneka is a 61 year old who is being evaluated via a billable video visit.      How would you like to obtain your AVS? MyChart  If the video visit is dropped, the invitation should be resent by: Text to cell phone: [phone]  Will anyone else be joining your video visit? No     Shannon Omerza VF      Video Start Time: 8:09 AM  Video-Visit Details    Type of service:  Video Visit    Video End Time:8:25 AM    Originating Location (pt. Location): Home    Distant Location (provider location):  [facility]     Platform used for Video Visit: [facility]    ONCOLOGY FOLLOW UP NOTE: 1/26/22        I took the history from reviewing the previous notes that she was following with Dr. Amaya.  I have copied and updated from prior notes.    ONCOLOGY History:    1. January 2006:  Diagnosed with Stage IIB, T2 N1 M0 invasive lobular carcinoma of the right breast.  Final pathology showed a 4.5 x 3.8 x 2.5 cm, 01/14 lymph nodes positive.  Estrogen, progesterone receptor positive, HER-2 negative.     2.  Genetics.  BRCA1 and 2 mutations not detected. 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       THERAPY TO DATE:   1. 2006:  ECOG 2104 protocol of dose-dense Adriamycin, Cytoxan and Avastin x4 cycles followed by Taxol and Avastin x4 cycles.   2.  03/2007:  Completed 1 year Avastin.   3.  11/2006-01/2007:  Radiotherapy to the right breast of 5040 cGy.   4.  03/[id number]:  Aromasin.  Stopped after moving to the [facility].   5.  01/2016:  Right modified radical mastectomy with latissimus dorsi flap reconstruction and a left prophylactic mastectomy with latissimus dorsi flap reconstruction.     She recently had MRI of the brain as a follow-up  for multiple sclerosis and there were multiple calvarial lesions noted which was suspicious for metastatic disease.  There was no evidence of new multiple sclerosis lesions.  She had a PET scan on 8/30/2019 which showed widespread bone metastatic lesions (calvarium, spine, sacrum, pelvis, ribs most prominent at C7, T3, L1 and L4), hypermetabolic 3 cm lesion in LLL, and mediastinal/hilar LN. CEA wnl at 1.9 and C27-29 elevated to 415 (28 on 8/23/16). A CT guided biopsy of the right iliac bone was obtained on 9/4/19, pathology consistent with metastatic adenocarcinoma (breast), ER/SD negative, HER-2 negative PDL 1 < 1%. A second biopsy was take of the LLL nodule via EBUS on 9/5/19 did not show any malignancy.    C/T/L spine MRI 8/3/19 to 9/2/19 with numerous enhancing lesions of the C, T and L spine, largest around T9 and L1. No evidence of cord compression. Has a L1 compression fracture and impending L4.     Received radiation T12-L5 3000 cGy in 10 fractions from 9/6/2019 till 9/18/2019.  Neurosurgery recommended no surgical intervention but to wear [place] brace when out of bed and HOB >30 degrees    She started palliative Xeloda 2000/1500 on 10/1/2019 but after just a few doses she noticed nausea, red eyes blurred vision, loss of appetite.  She stopped taking it after 5 doses and was seen by nurse practitioner on 10/7/2019 when she was improving.  At that point she was restarted on Xeloda at a lower dose of 1000 mg twice a day.  As she was not able to tolerate even the lower dose with extreme nausea and vomiting and feeling extremely fatigued and blurry vision, we decided on stopping Xeloda.    We decided on repeating scans and MRI brain on 10/25/2019 showed no intracranial metastatic disease.   Multiple enhancing calvarial lesions may be increased in number and are suggestive of metastatic disease. Thinner imaging on the current study may identify the smaller lesions and may be responsible for  identified more  lesions.   There is a single focus of T2 hyperintense signal within the anterior right temporal lobe may represent interval demyelination. There is no evidence for active demyelination.    PET/CT on 11/1/2019 showed favorable response to therapy and Overall FDG uptake within scattered osseous metastases is decreased since 8/30/2019, particularly within the pelvis. Increased sclerotic appearance of L1 and L4 pathologic compression deformities, likely sequela of recently completed palliative radiation therapy.    No significant FDG uptake in previously demonstrated hypermetabolic mediastinal lymph nodes. Biopsy negative on 9/5/2019.  There is also decreased FDG uptake in the left lower lobe (biopsy negative for  malignancy), now with dense consolidation containing air bronchograms suggestive of infection versus aspiration.  There is diffuse FDG uptake within the esophagus, consistent with esophagitis.    Because of intolerance to Xeloda we decided on switching to weekly paclitaxel on 11/5/2019.    C#2 Taxol 12/4/19- started with 70mg/m2 dose reduction    C#3 Taxol 12/30/2019     PET/CT on 1/24/2020 showed progression of the disease in some of the bone lesions including increase in size and lytic lesion within the vertebral body of C4 measuring 1 cm as opposed to 0.6 cm previously and a new central soft tissue nodule with SUV max 4.1 when previously it was non-hypermetabolic.  There is also some progression noted in the right proximal femur and right iliac bone.  There is decreased metabolic uptake in the right lamina of T9 with SUV max 4.7 when previously it was 8.0.  Other lesions are stable.    CA 27-29 also had increased significantly and it was 257 on 1/28/2020.    We decided on switching to eribulin on 1/28/2020.    C#2 2/18/2020  C#2 D#8 2/25/2020    C#3 D#1 3/18/2020 -delayed by 1 week as per patient's preference because she wanted to visit her family.    She had a repeat PET/CT on 3/27/2020 which showed stable  size of the bone metastatic lesions although the FDG avidity was less, consistent with treatment response.    She had MRI of the thoracic spine on 4/3/2020 and it was compared to the one which was done in August 2019 and it showed increased size of several metastatic lesions most notably at T9 but also at T5-T7.  Other lesions at T10, T11 and T12 appeared improved.    CA 27-29 was also down to 222 on 3/18/2020.    Cycle #4 eribulin ( dose reduced to 1.2 mg/m2 )-4/7/2020-   Cycle #5 Eribulin ( 1.2 mg/m2 )- 4/28/2020   Cycle #6 Eribulin ( 1.2 mg/m2 )- 5/19/2020     Cycle #7 Eribulin ( 1.2 mg/m2 )- 6/9/2020    Cycle #8 Eribulin ( 1.2 mg/m2 )- 6/30/2020     She had a repeat PET scan on 7/17/2020 which showed incidental finding of new acute bilateral pulmonary embolism in the distal left main pulmonary artery extending in the left upper and lower lobes and in the segmental and branch arteries in the right lower lobe.    It also showed mildly increased FDG avidity in the lytic lesion in the T9 lamina and right pedicle with SUV max 5.3, previously 3.9.  That is also slightly increased FDG uptake to the left anterior fifth rib at the costochondral junction SUV max 3.9, previously 2.5.  There is slightly decreased FDG uptake in the right femoral neck lesion SUV max 2.2, previously 2.9.  FDG uptake in other bone lesions is fairly stable.  No new metastasis are seen.    CA 27-29 was 308 on 6/30/2020.  It was 286 on 5/19/2020.    Overall this is consistent with only slight progression versus stable disease.    She was started on Lovenox.    Cycle #9 eribulin 7/21/2020. CA 27-29 was 238    C#10 eribulin 8/18/2020. ( delayed by one week for ANC 0.9 )    C#11 Eribulin 9/8/2020    C#12 Eribulin 9/29/2020     C#13 Eribulin 10/20/2020     PET scan on 11/6/2020 shows progression of the disease with increased FDG uptake in the lytic lesions in the T9/T10 and right posterolateral elements as well as increased FDG uptake in the  previously known hypermetabolic right iliac bone lesion suggesting recurrence of previously treated metastasis.  There is new subtle lesion in the right manubrium.  There is also a new fracture in the anterolateral left fourth rib with associated uptake and this could be a pathologic fracture.  Healing fracture in the left anterior fifth rib lesion.  There is resolution of the left pulmonary emboli.  Decreased FDG uptake of the esophagus consistent with improving inflammation.    CA 27-29 on 10/20/2020 was also elevated at 489.    C#1 Trodelvy on 11/11/2020.  Cycle #2 Trodelvy 12/2/2020  Cycle number 2-day #8 Trodelvy 12/8/2020.    12/15/2020-12/16/2020.  She was admitted to the hospital with nausea vomiting and dehydration.  She had tachycardia and initial lactic acid of 5.  It decreased to 1.4 after 2 L of normal saline.  She also had hypokalemia and ANC was 1.3.  She was treated with IV fluids.  Procalcitonin was negative.  CTA of the chest was negative for pulmonary embolism or pneumonia.  No bacterial infection was documented.  Her medication which she was initially unable to tolerate at home, were restarted and she was discharged home once started to feel better.      We delayed the start of cycle number 3 x 1 week and decrease the dose of chemotherapy by 25%.  She also had neutropenia with ANC of 1.0.      She started cycle number 3-day #1 on 12/29/2020.  CA 27-29 was 392.    Cycle number 3-day #8 1/5/2021.    C#4 Trodelvy 1/20/2021- 75% dose    2/5/2021.  PET/CT overall shows a good response to treatment with improvement of previously hypermetabolic lesions in the spine and pelvis and stability of other bone meta stasis.  There is a single lytic lesion in the right iliac bone which demonstrates slight Yimi increased FDG uptake and has SUV max 4.7 while previously it was SUV max 3.4.  There was diffuse wall thickening of the esophagus with uptake in the esophagus in the fundus of the stomach and this could be  seen with inflammation.    Trodelvy cycle #5 - 2/10/2021.  75% of the dose.  CA 27-29 was 337.    Trodelvy cycle #6 - 3/3/2021.  75% of the dose.  Trodelvy cycle #6, D#8 - 3/10/2021.  75% of the dose.    Trodelvy C#8, D#8 on 4/21/2021  CA 27-29 stable at 371 on 4/14/2021    Trodelvy, cycle #9- 5/5/2021        On 2/25/2020 when she had biopsy of the right acetabulum and it was consistent with metastatic lobular carcinoma.  Again it was Triple Negative.     On 3/18/2020 when she also met with Dr. Arelis Arshad who also advised testing for androgen receptor studies on the biopsy specimen.  Tumor was diffusely androgen receptor positive.      5/26/2021-cycle #10 Trodelvy started    CA 27-29 was 545.    6/11/2021.  PET/CT shows mildly increased uptake in several bone lesions for example in the left anterior iliac crest, mid right iliac crest and left ischium.  Uptake in other bone lesions are similar to previously.    Because of minimal progression of the disease and she is tolerating chemotherapy very well, we decided on continuing the same chemotherapy.    6/16/2021-Trodelvy cycle #11    7/7/2021 -Trodelvy cycle #12    9/14/2021-Trodelvy-cycle #15, day #8.    9/8/2021-CA 27-29 was more elevated and it was 1176.    PET/CT on 9/24/2021 showed stable findings with no evidence of FDG avid metastatic disease within the body.  Left axillary lymph nodes are prominent and hypermetabolic and likely from recent vaccinations in the left deltoid muscle.  Healed bony metastasis seems stable.      Cycle #16, Trodelvy 9/28/2021.  Cycle #17, day #8 Trodelvy was on 10/26/2021.  Cycle #18, day #8 Trodelvy on 11/22/2021       She saw Dr. Mejia whom on 10/6/2021.  She was not considered eligible for Enfortumab trial.    Cycle #19, Trodelvy on 12/7/2021 12/21/2021.  PET/CT showed progression of bony metastatic disease.  There is increase hypermetabolism in both the right and left iliac bones and the sternum.  Other bone lesions are  stable.    There is new scattered areas of groundglass throughout both the lungs and these findings are indeterminate but may represent an infectious etiology.  Interval resolution of left axillary FDG avid lymphadenopathy.    She was started on Casodex 150 mg daily on 1/6/2022.    Interval history.  This is a video visit.    Overall doing well and tolerating Casodex with some dry mouth and poor appetite. She has lost a few lbs over the last one month.  She has been switched to Buprenorphine patch. She takes occasional MSIR.  No nausea or vomiting. Had some constipation and senna helps. No new pain or new swellings. Overall feels the same energy.  No SOB. No infections. Neuropathy remains mild and stable.     ECOG 1    ROS:  Rest of the comprehensive review of the system was negative.        I reviewed other history in epic as below.       PAST MEDICAL HISTORY:     1.  Breast cancer  2.  Multiple sclerosis.   3.  Depression.   4.  Migraines.   5.  Hypertension.   6.  Cholecystectomy and umbilical hernia repair.     SOCIAL HISTORY: She smoked for 5 years but quit many years ago.  Drinks alcohol socially.  She lives with her .  She is a teacher in middle school.       FAMILY HISTORY: She mentions that her mother had breast cancer at 49.  Both grandmothers had breast cancer but she is not sure of the age.  A couple of cousins have cancers.  Patient has 3 children who are healthy.    Current Outpatient Medications   Medication     acetaminophen (TYLENOL) 500 MG tablet     albuterol (PROAIR HFA/PROVENTIL HFA/VENTOLIN HFA) 108 (90 Base) MCG/ACT inhaler     bicalutamide (CASODEX) 50 MG tablet     buprenorphine (BUTRANS) 5 MCG/HR WK patch     busPIRone (BUSPAR) 15 MG tablet     calcium citrate-vitamin D (CITRACAL) 315-250 MG-UNIT TABS     Cholecalciferol (VITAMIN D3 PO)     ELIQUIS ANTICOAGULANT 5 MG tablet     HEMP OIL OR EXTRACT OR OTHER CBD CANNABINOID, NOT MEDICAL CANNABIS,     ibuprofen (ADVIL/MOTRIN) 600 MG  tablet     loratadine (CLARITIN) 10 MG tablet     LORazepam (ATIVAN) 1 MG tablet     magnesium 250 MG tablet     morphine (MSIR) 15 MG IR tablet     Nerve Stimulator (NERIVIO) CRISTOPHER     OLANZapine (ZYPREXA) 5 MG tablet     potassium chloride ER (KLOR-CON M) 10 MEQ CR tablet     promethazine (PHENERGAN) 25 MG tablet     propranolol ER (INDERAL LA) 80 MG 24 hr capsule     senna-docusate (SENOKOT-S/PERICOLACE) 8.6-50 MG tablet     traZODone (DESYREL) 50 MG tablet     venlafaxine (EFFEXOR-XR) 75 MG 24 hr capsule     No current facility-administered medications for this visit.        Allergies   Allergen Reactions     Bactrim [Sulfamethoxazole W/Trimethoprim]      Internal bleeding     Copaxone [Glatiramer] Hives          PHYSICAL EXAMINATION:     There were no vitals taken for this visit.  Wt Readings from Last 4 Encounters:   12/14/21 64.6 kg (142 lb 6.4 oz)   12/07/21 65.2 kg (143 lb 12.8 oz)   11/22/21 65.5 kg (144 lb 6.4 oz)   11/16/21 64.9 kg (143 lb)           Constitutional.  Does not seem to be in any acute distress.  Eyes.  No redness or discharge noted.  Respiratory.  Speaking in full sentences.  Breathing seems comfortable without any accessory use of muscles.    Skin.  Visualized his skin does not show any obvious rashes.  Musculoskeletal.  Range of motion for visualized areas is intact.  Neurological.  Alert and oriented x3.  Psychiatric.  Mood, mentation and affect are normal.  Decision making capacity is intact.      The rest of a comprehensive physical examination is deferred due to Public Health Emergency video visit restrictions.        Labs and Imaging.    Reviewed.    12/28/2021.  CBC showed WBC 2.5.  ANC 1.4.  Hemoglobin 10.9.  Platelets 159.  CMP shows albumin 3.2.  Phosphorus 5.4.    CA 27-29 was 1077  CA 27-29 continues to increase and it is 1206 on 12/7/2021    CA 27-29 was 940 on 11/16/2021.  CA 27-29 was 1176 on 9/8/2021.  It was 1023 on 10/19/2021.    9/28/2021.      Iron studies, ferritin is  101, iron 51, TIBC 268 and iron saturation index 19%.    CA 27-29 was 465 on 6/16/2021.  It was 545 on 5/26/2021 12/21/2021.  PET/CT showed progression of bony metastatic disease.  There is increase hypermetabolism in both the right and left iliac bones and the sternum.  Other bone lesions are is stable.    There is new scattered areas of groundglass throughout both the lungs and these findings are indeterminate but may represent an infectious etiology.  Interval resolution of left axillary FDG avid lymphadenopathy.      Biopsy from the right acetabulum shows metastatic lobular carcinoma.  It is triple negative.  Androgen receptor was diffusely positive (90%, moderate intensity) by immunohistochemistry. )  PD-L1 negative.    Foundation one showed CDH1 mutation (initially lobular cancer), CCND1 amplification ( equivocal ). FGF3, FGF4, FGF19 amplification ( Equivocal ). Most promising is CCND1 amplification with abemaciclib showing response in 4/10 patients. FGF3,FGF4 and FGF19 are targetable with pan-FGFR inhibitors.           ASSESSMENT AND PLAN:     Now she has metastatic breast cancer negative breast cancer (androgen receptor positive ) with extensive involvement of the bones.  There is also a left lower lobe hypermetabolic lesion and mediastinal lymph nodes which are hypermetabolic.  The biopsy from the right iliac bone is consistent with metastatic lobular breast cancer but it is hormone receptor and HER-2 negative and PDL 1 is also negative.    Foundation 1 testing did not reveal any actionable mutations.    She was intolerant to Xeloda and progressed on Taxol and eribulin.      We then switched to recently FDA approved sacituzumab govitecan-hziy (Trodelvy) for TNBC, based on the results of the phase II IMMU-132-01 clinical trial.   She started this on 11/11/2020.      We delayed the start of cycle number 3 x 1 week and decrease the dose of chemotherapy by 25% and she also has neutropenia with ANC of 1.  She  started cycle number 3-day #1 on 12/29/2020.      After 4 cycles of chemotherapy, overall the PET scan shows positive response to treatment apart from mildly increased FDG avidity in one of the right iliac bone lesions.  CA 27-29 was 454 slightly elevated from before.    We decided on continuing the same chemotherapy.      She obtained a second opinion from Dr. Castillo from [facility] who recommended a repeat biopsy to be done to make sure that she really has triple negative breast cancer.      She also met with Dr. Arelis Arshad on 3/18/2021.      After 10 cycles of Trodelvy, she had PET/CT on 6/11/2021 which showed mildly increased uptake in some of the bone lesions while stability in other bone lesions.      We discussed the situation in detail.      Because of minimal progression of the disease plus the fact that she had been tolerating treatments well, we decided on continuing the same chemotherapy.     After 15 cycles, repeat PET scan showed stable findings.  CA 27-29 has been increasing and it is 1176.      As she was tolerating the chemotherapy well and her quality of life has been decent and scans were stable although she had a rising CA 27-29, we decided on continuing the same chemotherapy because it has been a very slow progression of the disease.    Now there is clear progression of the disease in the bones.  There is increased FDG uptake in both right and left iliac bones and the sternum.    Foundation one showed CDH1 mutation (initially lobular cancer), CCND1 amplification ( equivocal ). FGF3, FGF4, FGF19 amplification ( Equivocal ). Most promising is CCND1 amplification with abemaciclib showing response in 4/10 patients. FGF3,FGF4 and FGF19 are targetable with pan-FGFR inhibitor    As the tumor is diffusely positive for androgen receptor, we we decided to start her on androgen receptor blockers.    I initially recommended enzalutamide 160 mg daily ( Enzalutamide for the Treatment of Androgen  Receptor-Expressing Triple-Negative Breast Cancer----J Clin Oncol. 2018 Mar 20; 36(9): 884-890. ).    Eventually we decided to start her on Casodex 150 mg daily instead of Enzalutamide due to cost issues.  Clinical Trial Clin Cancer Res. 2013 Oct 1;19(19):5505-12.   Phase II trial of bicalutamide in patients with androgen receptor-positive, estrogen receptor-negative metastatic Breast Cancer      In the future we can consider Doxil every 4 weeks.     She met with Dr. Mejia to consider clinical trial but at this time she is not eligible for any trials.    Overall she is tolerating Casodex well. We will continue the same. In about a month we will repeat labs including CA 27-29.      Bone disease.  She has metastatic disease to the bone.  Previously she got palliative radiation to T12-L5 3000 cGy in 10 fractions from 9/6/2019 till 9/18/2019.  She was evaluated by orthopedics Dr. Bipin Elliott on 11/1/2019 and conservative management was recommended.    She was on Zometa every 3 months. She last received on 9/29/2020.  Because of progression of the disease in the bones, we switched to Xgeva which she received on 11/11/2020.  She now has evidence of some progression in the bones.  At this time we will continue with Xgeva.  She last received on 12/28/2021.  Continue calcium and vitamin D.      Cytopenias.  Chemotherapy related. Repeat labs in about a month    Dry mouth. Advised her to keep herself well-hydrated. I also advised her to keep humidifier by her bedside when she is sleeping.    Poor appetite. Weight loss. Advised her to increase protein in her diet. She should drink 2-3 Ensure/boost every day. I will also have her see nutritionist.    Pain management. Now on buprenorphine patch. I reviewed notes from Dr. Whitaker. She takes morphine sulfate immediate release very infrequently as needed. She will follow with palliative care.     Constipation. Continue senna.    Bilateral pulmonary emboli.  Continue Eliquis  for incidentally found PE.         We did not address the following today.  Neuropathy.  This remains mild and stable with neuropathy in her hands.    This is in addition to the chronic balance issues and heat and cold sensitivity from underlying multiple sclerosis which is also stable for years.  Continue to monitor.     Subcutaneous nodule in the scalp.  This looks like an epidermoid cyst.  She thinks it is a stable.  Continue to monitor.       Insomnia.  Continue trazodone.      Wall thickening of esophagus and FDG uptake of fundus of the stomach.  It could be in the setting of inflammation from recent nausea and vomiting.  Symptoms have resolved.  Continue to monitor clinically.    Vision changes.    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  It was thought that this could be due to Taxol as patient reported to the ophthalmologist that she was on Taxol in September 2019 when her symptoms started.  But she was not on Taxol in September 2019 when she started noticing the visual changes so Taxol is not responsible for this.  The first dose of Taxol was on 11/5/2019.   She had left cataract extraction on 2/8/2021 and she has noticed significant improvement in her vision.  She plans to do cataract extraction of the right eye in couple of weeks.      Increased FDG ability in the colon.  She had FDG uptake in the cecum and ascending colon but no corresponding lesion was seen on the CT scan.  She is completely asymptomatic so at this time we will continue to observe.    Discussion regarding healthcare directives.  On previous visit she told me that she will bring the health care directive for our records but she forgot so I told her to bring it on next visit.      Breast implant removal.  She tells me that she was planning to do breast implant removal because there was a recall on this.  Her surgery was scheduled for 9/24/2019.  Because of this new development of metastatic breast  cancer and her recent radiation, surgery has been canceled.  We discussed that at this time treatment for metastatic breast cancer would take precedence over the other surgery as the other surgery would require her to be off chemotherapy for several weeks and in that case the chances of cancer progression would be high and I would not recommend that.    Multiple sclerosis.  She will continue to follow with her neurologist Dr. Quiles.  Currently multiple sclerosis is under control and currently she is not taking any medications.    See me back in about 1 month.      All of her questions were answered to her satisfaction.  She is agreeable and comfortable with the plan.    Dewayne Zepeda MD                    Again, thank you for allowing me to participate in the care of your patient.        Sincerely,        Dewayne Zepeda MD

## 2022-01-26 NOTE — PROGRESS NOTES
Infusion Nursing Note:  Shaneka Alvarez presents today for Xgeva.    Patient seen by provider today: No   present during visit today: Not Applicable.    Note: N/A.      Intravenous Access:  Implanted Port.    Treatment Conditions:  Lab Results   Component Value Date     12/28/2021    POTASSIUM 3.7 12/28/2021    MAG 2.0 12/28/2021    CR 0.70 01/26/2022    RAFAELA 8.7 01/26/2022    BILITOTAL 0.2 12/28/2021    ALBUMIN 3.6 01/26/2022    ALT 22 12/28/2021    AST 15 12/28/2021     Results reviewed, labs MET treatment parameters, ok to proceed with treatment.      Post Infusion Assessment:  Patient tolerated injection without incident.  Site patent and intact, free from redness, edema or discomfort.       Discharge Plan:   Discharge instructions reviewed with: Patient.  Patient and/or family verbalized understanding of discharge instructions and all questions answered.  Patient discharged in stable condition accompanied by: self.  Departure Mode: Ambulatory.      Yanci Kate RN

## 2022-01-26 NOTE — PROGRESS NOTES
Port site scrubbed with Chloraprep for 30 seconds. Accessed port using sterile technique. Green tubes drawn. Double signed by patient and RN. See documentation flowsheet. Port needle left accessed for treatment. Tolerated port access and draw without complaint. Lauren Shaw RN on 1/26/2022 at 3:56 PM

## 2022-02-02 DIAGNOSIS — G89.3 CANCER ASSOCIATED PAIN: ICD-10-CM

## 2022-02-02 DIAGNOSIS — C79.51 BONE METASTASES: ICD-10-CM

## 2022-02-02 RX ORDER — BUPRENORPHINE 5 UG/H
1 PATCH TRANSDERMAL
Qty: 4 PATCH | Refills: 0 | Status: SHIPPED | OUTPATIENT
Start: 2022-02-02 | End: 2022-03-04

## 2022-02-02 NOTE — TELEPHONE ENCOUNTER
Received [employer] message from patient requesting refill of butrans.     Last refill: 1/12/22. I patch fell off after 2 days, request being made early.  Last office visit: 12/22/21  Scheduled for follow up 2/9/22     Will route request to MD for review.     Reviewed [place]  Report.

## 2022-02-09 ENCOUNTER — VIRTUAL VISIT (OUTPATIENT)
Dept: ONCOLOGY | Facility: CLINIC | Age: 62
End: 2022-02-09
Attending: HOSPITALIST
Payer: MEDICARE

## 2022-02-09 DIAGNOSIS — C79.51 BONE METASTASES: Primary | ICD-10-CM

## 2022-02-09 PROCEDURE — 99213 OFFICE O/P EST LOW 20 MIN: CPT | Mod: 95 | Performed by: HOSPITALIST

## 2022-02-09 NOTE — LETTER
2/9/2022         RE: Shaneka Alvarez  [id number] [address] [facility] [address]        Dear Colleague,    Thank you for referring your patient, Shaneka Alvarez, to the [facility]. Please see a copy of my visit note below.    Palliative Care Outpatient Clinic Progress Note    Patient Name: Shaneka Alvarez is being evaluated via a billable video visit.    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 Didn't tolerate Xeloda, paclitaxel Nov 2019-Jan 2020, treated with eribulin and zometa. PD seen on PET Nov 2020 as well as a new L 4th rib concerning for a pathologic fracture. Started Trodelvy 11/11/20, PD Dec 21, switched to casodex  - b/l PE found on PET July 2020, on anticoagulation  - MS with mild right-sided weakness    - long-standing history of migraines      Interim History:  At the last visit we discussed pain, started buprenorphine    Patient is on the call by herself.       Overall her pain is well controlled with butrans, now rarely takes MSIR.    A few days ago she had a lot of aching pain in her right lower back that kept her awake. Didn't respond well to MSIR 30mg. The pain was in the usual location and of the usual quality, but more severe. No obvious trigger. She was quite active yesterday, slept well the following night.      Social History:  Pertinent changes to social history/social situation since last visit: no changes    Advance Directive Status:   POLST completed 4/1/20:  DNR/selective treatments       Functional Status:  Palliative Performance Scale: 100        Impression & Recommendations & Counseliny/o woman with metastatic breast cancer on casodex    Pain: overall well controlled other than an isolated episode over night a few days ago.   - continue butrans 5mcg/hr patch  - keep MSIR available      Return to clinic in 2-3 months    Data / Chart Review:    Review of Systems:   ROS: 10 point ROS neg other than the symptoms noted above in the HPI and pertinents here.         Physical Exam:   Physical Exam:  Vital Signs not checked, virtual visit    CONSTIT: awake, appears comfortable  EENT: MMM, EOMI, no icterus  RESP: reg, nl effort, no cough  SKIN: no rash, no obvious lesions  NEURO: alert, oriented x3  PSYCH: appropriate affect, memory and thought process intact    The rest of a comprehensive physical examination is deferred  due to public health emergency video visit restrictions.        Current pertinent medications:  Buprenorphine 5mcg/hr patch, weekly  MSIR 15-30mg q3h prn              Naloxone prescribed  acetaminophen 1000mg tid  Ibuprofen 600mg q6h prn  CBD cream     Dexamethasone 4mg q12h     Venlafaxine 225mg daily  Lorazepam 1mg prn  Trazodone 50mg HS  Buspirone 15mg bid     Olanzapine prn after chemo  Promethazine q6h prn  Senna bid prn, miralax bid prn     : ok     Opioid safety assessment:   Expected prognosis >1 year  Risk: Low (ORT 0)  Indication for Opioid Use: Moderate or Strong  Baseline UDT performed on: not sent yet  Naloxone Script provided if patient also on benzodiazepine: 2019   Indications for Tapering reviewed: good dose decrease with starting buprenorphine    No pertinent allergies      Lab and imaging data reviewed:  Comments:       Video-Visit Details    Type of service:  Video Visit    Physician has received verbal consent for a Video Visit from the patient? Yes    Video Start Time: 1204    Video End Time (time video stopped):  1216    Originating Location (pt. Location): Home    Distant Location (provider location):  [facility]     Mode of Communication:  Video Conference via [facility]    Avla Whitaker MD  Palliative Medicine  Pager [phone]      Shaneka is a 61 year old who is being evaluated via a billable video visit.      How would you like to obtain your AVS? MyChart  If the video visit is dropped, the invitation should be resent by: Send to e-mail at: [email or web address]  Will anyone else be joining your video visit? No      Again, thank you for allowing me to participate in the care of your patient.        Sincerely,        Alva Whitaker MD

## 2022-02-09 NOTE — PROGRESS NOTES
Shaneka is a 61 year old who is being evaluated via a billable video visit.      How would you like to obtain your AVS? MyChart  If the video visit is dropped, the invitation should be resent by: Send to e-mail at: [email or web address]  Will anyone else be joining your video visit? No

## 2022-02-09 NOTE — PROGRESS NOTES
Palliative Care Outpatient Clinic Progress Note    Patient Name: Shaneka Alvarez is being evaluated via a billable video visit.    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 Didn't tolerate Xeloda, paclitaxel 2019-2020, treated with eribulin and zometa. PD seen on PET 2020 as well as a new L 4th rib concerning for a pathologic fracture. Started Trodelvy 20, PD Dec 21, switched to casodex  - b/l PE found on PET 2020, on anticoagulation  - MS with mild right-sided weakness    - long-standing history of migraines      Interim History:  At the last visit we discussed pain, started buprenorphine    Patient is on the call by herself.       Overall her pain is well controlled with butrans, now rarely takes MSIR.    A few days ago she had a lot of aching pain in her right lower back that kept her awake. Didn't respond well to MSIR 30mg. The pain was in the usual location and of the usual quality, but more severe. No obvious trigger. She was quite active yesterday, slept well the following night.      Social History:  Pertinent changes to social history/social situation since last visit: no changes    Advance Directive Status:   POLST completed 20: DNR/selective treatments       Functional Status:  Palliative Performance Scale: 100        Impression & Recommendations & Counseliny/o woman with metastatic breast cancer on casodex    Pain: overall well controlled other than an isolated episode over night a few  days ago.   - continue butrans 5mcg/hr patch  - keep MSIR available      Return to clinic in 2-3 months    Data / Chart Review:    Review of Systems:   ROS: 10 point ROS neg other than the symptoms noted above in the HPI and pertinents here.         Physical Exam:   Physical Exam:  Vital Signs not checked, virtual visit    CONSTIT: awake, appears comfortable  EENT: MMM, EOMI, no icterus  RESP: reg, nl effort, no cough  SKIN: no rash, no obvious lesions  NEURO: alert, oriented x3  PSYCH: appropriate affect, memory and thought process intact    The rest of a comprehensive physical examination is deferred  due to public [facility] emergency video visit restrictions.        Current pertinent medications:  Buprenorphine 5mcg/hr patch, weekly  MSIR 15-30mg q3h prn              Naloxone prescribed  acetaminophen 1000mg tid  Ibuprofen 600mg q6h prn  CBD cream     Dexamethasone 4mg q12h     Venlafaxine 225mg daily  Lorazepam 1mg prn  Trazodone 50mg HS  Buspirone 15mg bid     Olanzapine prn after chemo  Promethazine q6h prn  Senna bid prn, miralax bid prn     : ok     Opioid safety assessment:   Expected prognosis >1 year  Risk: Low (ORT 0)  Indication for Opioid Use: Moderate or Strong  Baseline UDT performed on: not sent yet  Naloxone Script provided if patient also on benzodiazepine: 12/4/2019   Indications for Tapering reviewed: good dose decrease with starting buprenorphine    No pertinent allergies      Lab and imaging data reviewed:  Comments:       Video-Visit Details    Type of service:  Video Visit    Physician has received verbal consent for a Video Visit from the patient? Yes    Video Start Time: 1204    Video End Time (time video stopped): 1216    Originating Location (pt. Location): Home    Distant Location (provider location):  [facility]     Mode of Communication:  Video Conference via Sabrina Whitaker MD  Palliative Medicine  Pager [phone]

## 2022-02-10 DIAGNOSIS — G47.00 INSOMNIA, UNSPECIFIED TYPE: ICD-10-CM

## 2022-02-10 RX ORDER — TRAZODONE HYDROCHLORIDE 50 MG/1
TABLET, FILM COATED ORAL
Qty: 30 TABLET | Refills: 1 | Status: SHIPPED | OUTPATIENT
Start: 2022-02-10 | End: 2022-04-13

## 2022-02-23 ENCOUNTER — TELEPHONE (OUTPATIENT)
Dept: ONCOLOGY | Facility: CLINIC | Age: 62
End: 2022-02-23
Payer: MEDICARE

## 2022-02-23 NOTE — TELEPHONE ENCOUNTER
Spoke to patient over the phone to see if she wanted to make an appointment with Nutrition and she said no, not at this time. ()

## 2022-02-28 ENCOUNTER — LAB (OUTPATIENT)
Dept: ONCOLOGY | Facility: CLINIC | Age: 62
End: 2022-02-28
Payer: MEDICARE

## 2022-02-28 ENCOUNTER — INFUSION THERAPY VISIT (OUTPATIENT)
Dept: INFUSION THERAPY | Facility: CLINIC | Age: 62
End: 2022-02-28
Payer: MEDICARE

## 2022-02-28 DIAGNOSIS — C79.51 BONE METASTASES: Primary | ICD-10-CM

## 2022-02-28 DIAGNOSIS — C50.919 METASTATIC BREAST CANCER: ICD-10-CM

## 2022-02-28 LAB
ALBUMIN SERPL-MCNC: 3.8 G/DL (ref 3.4–5)
CALCIUM SERPL-MCNC: 9.2 MG/DL (ref 8.5–10.1)
CREAT SERPL-MCNC: 0.72 MG/DL (ref 0.52–1.04)
GFR SERPL CREATININE-BSD FRML MDRD: >90 ML/MIN/1.73M2

## 2022-02-28 PROCEDURE — 82565 ASSAY OF CREATININE: CPT | Performed by: INTERNAL MEDICINE

## 2022-02-28 PROCEDURE — 82040 ASSAY OF SERUM ALBUMIN: CPT | Performed by: INTERNAL MEDICINE

## 2022-02-28 PROCEDURE — 82310 ASSAY OF CALCIUM: CPT | Performed by: INTERNAL MEDICINE

## 2022-02-28 PROCEDURE — 99207 PR NO CHARGE LOS: CPT

## 2022-02-28 PROCEDURE — 96372 THER/PROPH/DIAG INJ SC/IM: CPT | Performed by: NURSE PRACTITIONER

## 2022-02-28 RX ORDER — HEPARIN SODIUM (PORCINE) LOCK FLUSH IV SOLN 100 UNIT/ML 100 UNIT/ML
500 SOLUTION INTRAVENOUS DAILY PRN
Status: DISCONTINUED | OUTPATIENT
Start: 2022-02-28 | End: 2022-02-28 | Stop reason: HOSPADM

## 2022-02-28 RX ADMIN — HEPARIN SODIUM (PORCINE) LOCK FLUSH IV SOLN 100 UNIT/ML 500 UNITS: 100 SOLUTION at 13:22

## 2022-02-28 NOTE — PROGRESS NOTES
Port accessed using 20 G 3/4 inch needle.  Labs collected from port.  Line flushed with NS and Heparin.  Pt tolerated procedure.  Port de-accessed per protocol.    Joanne Ludwig RN-BSN, PHN, OCN  [facility]

## 2022-02-28 NOTE — PROGRESS NOTES
Infusion Nursing Note:  Shaneka Alvarez presents today for Xgeva.    Patient seen by provider today: No   present during visit today: Not Applicable.    Note: Assessment completed by Joanne MURRAY.    Intravenous Access:  Labs drawn without difficulty.    Treatment Conditions:  Results reviewed, labs MET treatment parameters, ok to proceed with treatment.      Post Infusion Assessment:  Patient tolerated injection without incident.       Discharge Plan:   Patient discharged in stable condition accompanied by: self.      Fang Hurt LPN

## 2022-03-01 ENCOUNTER — LAB (OUTPATIENT)
Dept: ONCOLOGY | Facility: CLINIC | Age: 62
End: 2022-03-01
Payer: MEDICARE

## 2022-03-01 ENCOUNTER — VIRTUAL VISIT (OUTPATIENT)
Dept: ONCOLOGY | Facility: CLINIC | Age: 62
End: 2022-03-01
Payer: MEDICARE

## 2022-03-01 DIAGNOSIS — C50.919 METASTATIC BREAST CANCER: ICD-10-CM

## 2022-03-01 DIAGNOSIS — C79.51 BONE METASTASES: Primary | ICD-10-CM

## 2022-03-01 LAB
ALBUMIN SERPL-MCNC: 3.7 G/DL (ref 3.4–5)
ALP SERPL-CCNC: 74 U/L (ref 40–150)
ALT SERPL W P-5'-P-CCNC: 26 U/L (ref 0–50)
ANION GAP SERPL CALCULATED.3IONS-SCNC: 6 MMOL/L (ref 3–14)
AST SERPL W P-5'-P-CCNC: 28 U/L (ref 0–45)
BASOPHILS # BLD AUTO: 0 10E3/UL (ref 0–0.2)
BASOPHILS NFR BLD AUTO: 1 %
BILIRUB SERPL-MCNC: 0.3 MG/DL (ref 0.2–1.3)
BUN SERPL-MCNC: 12 MG/DL (ref 7–30)
CALCIUM SERPL-MCNC: 9.4 MG/DL (ref 8.5–10.1)
CANCER AG27-29 SERPL-ACNC: 3146 U/ML (ref 0–39)
CHLORIDE BLD-SCNC: 102 MMOL/L (ref 94–109)
CO2 SERPL-SCNC: 29 MMOL/L (ref 20–32)
CREAT SERPL-MCNC: 0.85 MG/DL (ref 0.52–1.04)
EOSINOPHIL # BLD AUTO: 0.4 10E3/UL (ref 0–0.7)
EOSINOPHIL NFR BLD AUTO: 8 %
ERYTHROCYTE [DISTWIDTH] IN BLOOD BY AUTOMATED COUNT: 14.1 % (ref 10–15)
GFR SERPL CREATININE-BSD FRML MDRD: 78 ML/MIN/1.73M2
GLUCOSE BLD-MCNC: 86 MG/DL (ref 70–99)
HCT VFR BLD AUTO: 37.2 % (ref 35–47)
HGB BLD-MCNC: 11.9 G/DL (ref 11.7–15.7)
IMM GRANULOCYTES # BLD: 0 10E3/UL
IMM GRANULOCYTES NFR BLD: 1 %
LYMPHOCYTES # BLD AUTO: 1 10E3/UL (ref 0.8–5.3)
LYMPHOCYTES NFR BLD AUTO: 21 %
MCH RBC QN AUTO: 27.5 PG (ref 26.5–33)
MCHC RBC AUTO-ENTMCNC: 32 G/DL (ref 31.5–36.5)
MCV RBC AUTO: 86 FL (ref 78–100)
MONOCYTES # BLD AUTO: 0.7 10E3/UL (ref 0–1.3)
MONOCYTES NFR BLD AUTO: 15 %
NEUTROPHILS # BLD AUTO: 2.6 10E3/UL (ref 1.6–8.3)
NEUTROPHILS NFR BLD AUTO: 54 %
NRBC # BLD AUTO: 0 10E3/UL
NRBC BLD AUTO-RTO: 0 /100
PLATELET # BLD AUTO: 193 10E3/UL (ref 150–450)
POTASSIUM BLD-SCNC: 4 MMOL/L (ref 3.4–5.3)
PROT SERPL-MCNC: 7.3 G/DL (ref 6.8–8.8)
RBC # BLD AUTO: 4.32 10E6/UL (ref 3.8–5.2)
SODIUM SERPL-SCNC: 137 MMOL/L (ref 133–144)
WBC # BLD AUTO: 4.8 10E3/UL (ref 4–11)

## 2022-03-01 PROCEDURE — 99207 PR NO CHARGE LOS: CPT

## 2022-03-01 PROCEDURE — 99214 OFFICE O/P EST MOD 30 MIN: CPT | Mod: 95 | Performed by: INTERNAL MEDICINE

## 2022-03-01 PROCEDURE — 80053 COMPREHEN METABOLIC PANEL: CPT | Performed by: INTERNAL MEDICINE

## 2022-03-01 PROCEDURE — 85025 COMPLETE CBC W/AUTO DIFF WBC: CPT | Performed by: INTERNAL MEDICINE

## 2022-03-01 PROCEDURE — 86300 IMMUNOASSAY TUMOR CA 15-3: CPT | Performed by: INTERNAL MEDICINE

## 2022-03-01 RX ORDER — HEPARIN SODIUM (PORCINE) LOCK FLUSH IV SOLN 100 UNIT/ML 100 UNIT/ML
500 SOLUTION INTRAVENOUS DAILY PRN
Status: DISCONTINUED | OUTPATIENT
Start: 2022-03-01 | End: 2022-03-01 | Stop reason: HOSPADM

## 2022-03-01 RX ADMIN — HEPARIN SODIUM (PORCINE) LOCK FLUSH IV SOLN 100 UNIT/ML 500 UNITS: 100 SOLUTION at 13:04

## 2022-03-01 NOTE — LETTER
3/1/2022         RE: Shaneka Alvarez  [id number] [facility] [address]        Dear Colleague,    Thank you for referring your patient, Shaneka Alvarez, to the [facility]. Please see a copy of my visit note below.    Shaneka is a 61 year old who is being evaluated via a billable video visit.  Currently in [place].     How would you like to obtain your AVS? MyChart  If the video visit is dropped, the invitation should be resent by: Text to cell phone: [phone]  Will anyone else be joining your video visit? No    Video Start Time: 8:51 AM  Video-Visit Details    Type of service:  Video Visit    Video End Time:9:02 AM    Originating Location (pt. Location): Home    Distant Location (provider location):  [facility]     Platform used for Video Visit: Franklyn Murdock       ONCOLOGY FOLLOW UP NOTE: 3/01/22        I took the history from reviewing the previous notes that she was following with Dr. Amaya.  I have copied and updated from prior notes.    ONCOLOGY History:    1. January 2006:  Diagnosed with Stage IIB, T2 N1 M0 invasive lobular carcinoma of the right breast.  Final pathology showed a 4.5 x 3.8 x 2.5 cm, 01/14 lymph nodes positive.  Estrogen, progesterone receptor positive, HER-2 negative.     2.  Genetics.  BRCA1 and 2 mutations not detected. 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       THERAPY TO DATE:   1. 2006:  ECOG 2104 protocol of dose-dense Adriamycin, Cytoxan and Avastin x4 cycles followed by Taxol and Avastin x4 cycles.   2.  03/2007:  Completed 1 year Avastin.   3.  11/2006-01/2007:  Radiotherapy to the right breast of 5040 cGy.   4.  03/20071354-7470:  Aromasin.  Stopped after moving to the [facility].   5. 01/2016:  Right modified radical mastectomy with latissimus dorsi flap reconstruction and a left prophylactic mastectomy with latissimus dorsi flap reconstruction.     She recently had MRI of the  brain as a follow-up for multiple sclerosis and there were multiple calvarial lesions noted which was suspicious for metastatic disease.  There was no evidence of new multiple sclerosis lesions.  She had a PET scan on 8/30/2019 which showed widespread bone metastatic lesions (calvarium, spine, sacrum, pelvis, ribs most prominent at C7, T3, L1 and L4), hypermetabolic 3 cm lesion in LLL, and mediastinal/hilar LN. CEA wnl at 1.9 and C27-29 elevated to 415 (28 on 8/23/16). A CT guided biopsy of the right iliac bone was obtained on 9/4/19, pathology consistent with metastatic adenocarcinoma (breast), ER/IN negative, HER-2 negative PDL 1 < 1%. A second biopsy was take of the LLL nodule via EBUS on 9/5/19 did not show any malignancy.    C/T/L spine MRI 8/3/19 to 9/2/19 with numerous enhancing lesions of the C, T and L spine, largest around T9 and L1. No evidence of cord compression. Has a L1 compression fracture and impending L4.     Received radiation T12-L5 3000 cGy in 10 fractions from 9/6/2019 till 9/18/2019.  Neurosurgery recommended no surgical intervention but to wear Gogre brace when out of bed and HOB >30 degrees    She started palliative Xeloda 2000/1500 on 10/1/2019 but after just a few doses she noticed nausea, red eyes blurred vision, loss of appetite.  She stopped taking it after 5 doses and was seen by nurse practitioner on 10/7/2019 when she was improving.  At that point she was restarted on Xeloda at a lower dose of 1000 mg twice a day.  As she was not able to tolerate even the lower dose with extreme nausea and vomiting and feeling extremely fatigued and blurry vision, we decided on stopping Xeloda.    We decided on repeating scans and MRI brain on 10/25/2019 showed no intracranial metastatic disease.   Multiple enhancing calvarial lesions may be increased in number and are suggestive of metastatic disease. Thinner imaging on the current study may identify the smaller lesions and may be responsible  for  identified more lesions.   There is a single focus of T2 hyperintense signal within the anterior right temporal lobe may represent interval demyelination. There is no evidence for active demyelination.    PET/CT on 11/1/2019 showed favorable response to therapy and Overall FDG uptake within scattered osseous metastases is decreased since 8/30/2019, particularly within the pelvis. Increased sclerotic appearance of L1 and L4 pathologic compression deformities, likely sequela of recently completed palliative radiation therapy.    No significant FDG uptake in previously demonstrated hypermetabolic mediastinal lymph nodes. Biopsy negative on 9/5/2019.  There is also decreased FDG uptake in the left lower lobe (biopsy negative for  malignancy), now with dense consolidation containing air bronchograms suggestive of infection versus aspiration.  There is diffuse FDG uptake within the esophagus, consistent with esophagitis.    Because of intolerance to Xeloda we decided on switching to weekly paclitaxel on 11/5/2019.    C#2 Taxol 12/4/19- started with 70mg/m2 dose reduction    C#3 Taxol 12/30/2019     PET/CT on 1/24/2020 showed progression of the disease in some of the bone lesions including increase in size and lytic lesion within the vertebral body of C4 measuring 1 cm as opposed to 0.6 cm previously and a new central soft tissue nodule with SUV max 4.1 when previously it was non-hypermetabolic.  There is also some progression noted in the right proximal femur and right iliac bone.  There is decreased metabolic uptake in the right lamina of T9 with SUV max 4.7 when previously it was 8.0.  Other lesions are stable.    CA 27-29 also had increased significantly and it was 257 on 1/28/2020.    We decided on switching to eribulin on 1/28/2020.    C#2 2/18/2020  C#2 D#8 2/25/2020    C#3 D#1 3/18/2020 -delayed by 1 week as per patient's preference because she wanted to visit her family.    She had a repeat PET/CT on  3/27/2020 which showed stable size of the bone metastatic lesions although the FDG avidity was less, consistent with treatment response.    She had MRI of the thoracic spine on 4/3/2020 and it was compared to the one which was done in August 2019 and it showed increased size of several metastatic lesions most notably at T9 but also at T5-T7.  Other lesions at T10, T11 and T12 appeared improved.    CA 27-29 was also down to 222 on 3/18/2020.    Cycle #4 eribulin ( dose reduced to 1.2 mg/m2 )-4/7/2020-   Cycle #5 Eribulin ( 1.2 mg/m2 )- 4/28/2020   Cycle #6 Eribulin ( 1.2 mg/m2 )- 5/19/2020     Cycle #7 Eribulin ( 1.2 mg/m2 )- 6/9/2020    Cycle #8 Eribulin ( 1.2 mg/m2 )- 6/30/2020     She had a repeat PET scan on 7/17/2020 which showed incidental finding of new acute bilateral pulmonary embolism in the distal left main pulmonary artery extending in the left upper and lower lobes and in the segmental and branch arteries in the right lower lobe.    It also showed mildly increased FDG avidity in the lytic lesion in the T9 lamina and right pedicle with SUV max 5.3, previously 3.9.  That is also slightly increased FDG uptake to the left anterior fifth rib at the costochondral junction SUV max 3.9, previously 2.5.  There is slightly decreased FDG uptake in the right femoral neck lesion SUV max 2.2, previously 2.9.  FDG uptake in other bone lesions is fairly stable.  No new metastasis are seen.    CA 27-29 was 308 on 6/30/2020.  It was 286 on 5/19/2020.    Overall this is consistent with only slight progression versus stable disease.    She was started on Lovenox.    Cycle #9 eribulin 7/21/2020. CA 27-29 was 238    C#10 eribulin 8/18/2020. ( delayed by one week for ANC 0.9 )    C#11 Eribulin 9/8/2020    C#12 Eribulin 9/29/2020     C#13 Eribulin 10/20/2020     PET scan on 11/6/2020 shows progression of the disease with increased FDG uptake in the lytic lesions in the T9/T10 and right posterolateral elements as well as  increased FDG uptake in the previously known hypermetabolic right iliac bone lesion suggesting recurrence of previously treated metastasis.  There is new subtle lesion in the right manubrium.  There is also a new fracture in the anterolateral left fourth rib with associated uptake and this could be a pathologic fracture.  Healing fracture in the left anterior fifth rib lesion.  There is resolution of the left pulmonary emboli.  Decreased FDG uptake of the esophagus consistent with improving inflammation.    CA 27-29 on 10/20/2020 was also elevated at 489.    C#1 Trodelvy on 11/11/2020.  Cycle #2 Trodelvy 12/2/2020  Cycle number 2-day #8 Trodelvy 12/8/2020.    12/15/2020-12/16/2020.  She was admitted to the hospital with nausea vomiting and dehydration.  She had tachycardia and initial lactic acid of 5.  It decreased to 1.4 after 2 L of normal saline.  She also had hypokalemia and ANC was 1.3.  She was treated with IV fluids.  Procalcitonin was negative.  CTA of the chest was negative for pulmonary embolism or pneumonia.  No bacterial infection was documented.  Her medication which she was initially unable to tolerate at home, were restarted and she was discharged home once started to feel better.      We delayed the start of cycle number 3 x 1 week and decrease the dose of chemotherapy by 25%.  She also had neutropenia with ANC of 1.0.      She started cycle number 3-day #1 on 12/29/2020.  CA 27-29 was 392.    Cycle number 3-day #8 1/5/2021.    C#4 Trodelvy 1/20/2021- 75% dose    2/5/2021.  PET/CT overall shows a good response to treatment with improvement of previously hypermetabolic lesions in the spine and pelvis and stability of other bone meta stasis.  There is a single lytic lesion in the right iliac bone which demonstrates slight Yimi increased FDG uptake and has SUV max 4.7 while previously it was SUV max 3.4.  There was diffuse wall thickening of the esophagus with uptake in the esophagus in the fundus of  the stomach and this could be seen with inflammation.    Trodelvy cycle #5 - 2/10/2021.  75% of the dose.  CA 27-29 was 337.    Trodelvy cycle #6 - 3/3/2021.  75% of the dose.  Trodelvy cycle #6, D#8 - 3/10/2021.  75% of the dose.    Trodelvy C#8, D#8 on 4/21/2021  CA 27-29 stable at 371 on 4/14/2021    Trodelvy, cycle #9- 5/5/2021        On 2/25/2020 when she had biopsy of the right acetabulum and it was consistent with metastatic lobular carcinoma.  Again it was Triple Negative.     On 3/18/2020 when she also met with Dr. Arelis Arshad who also advised testing for androgen receptor studies on the biopsy specimen.  Tumor was diffusely androgen receptor positive.      5/26/2021-cycle #10 Trodelvy started    CA 27-29 was 545.    6/11/2021.  PET/CT shows mildly increased uptake in several bone lesions for example in the left anterior iliac crest, mid right iliac crest and left ischium.  Uptake in other bone lesions are similar to previously.    Because of minimal progression of the disease and she is tolerating chemotherapy very well, we decided on continuing the same chemotherapy.    6/16/2021-Trodelvy cycle #11    7/7/2021 -Trodelvy cycle #12    9/14/2021-Trodelvy-cycle #15, day #8.    9/8/2021-CA 27-29 was more elevated and it was 1176.    PET/CT on 9/24/2021 showed stable findings with no evidence of FDG avid metastatic disease within the body.  Left axillary lymph nodes are prominent and hypermetabolic and likely from recent vaccinations in the left deltoid muscle.  Healed bony metastasis seems stable.      Cycle #16, Trodelvy 9/28/2021.  Cycle #17, day #8 Trodelvy was on 10/26/2021.  Cycle #18, day #8 Trodelvy on 11/22/2021       She saw Dr. Mejia whom on 10/6/2021.  She was not considered eligible for Enfortumab trial.    Cycle #19, Trodelvy on 12/7/2021 12/21/2021.  PET/CT showed progression of bony metastatic disease.  There is increase hypermetabolism in both the right and left iliac bones and the sternum.   Other bone lesions are stable.    There is new scattered areas of groundglass throughout both the lungs and these findings are indeterminate but may represent an infectious etiology.  Interval resolution of left axillary FDG avid lymphadenopathy.    She was started on Casodex 150 mg daily on 1/6/2022.    Interval history.  This is a video visit.    She is tolerating Casodex well. Pain is under good control with buprenorphine. Takes MSIR 1-2 tabs/week. No new pain or new swellings.   Energy is decent. Has dry mouth and controlling by drinking more water. No infections or SOB. Neuropathy is mild and same. Weight is stable. Vision is good.    ECOG 1    ROS:  Rest of the comprehensive review of the system was negative.        I reviewed other history in epic as below.       PAST MEDICAL HISTORY:     1.  Breast cancer  2.  Multiple sclerosis.   3.  Depression.   4.  Migraines.   5.  Hypertension.   6.  Cholecystectomy and umbilical hernia repair.     SOCIAL HISTORY: She smoked for 5 years but quit many years ago.  Drinks alcohol socially.  She lives with her .  She is a teacher in middle school.       FAMILY HISTORY: She mentions that her mother had breast cancer at 49.  Both grandmothers had breast cancer but she is not sure of the age.  A couple of cousins have cancers.  Patient has 3 children who are healthy.    Current Outpatient Medications   Medication     buprenorphine (BUTRANS) 5 MCG/HR WK patch     acetaminophen (TYLENOL) 500 MG tablet     albuterol (PROAIR HFA/PROVENTIL HFA/VENTOLIN HFA) 108 (90 Base) MCG/ACT inhaler     bicalutamide (CASODEX) 50 MG tablet     busPIRone (BUSPAR) 15 MG tablet     calcium citrate-vitamin D (CITRACAL) 315-250 MG-UNIT TABS     Cholecalciferol (VITAMIN D3 PO)     ELIQUIS ANTICOAGULANT 5 MG tablet     HEMP OIL OR EXTRACT OR OTHER CBD CANNABINOID, NOT MEDICAL CANNABIS,     ibuprofen (ADVIL/MOTRIN) 600 MG tablet     loratadine (CLARITIN) 10 MG tablet     LORazepam (ATIVAN) 1 MG  tablet     magnesium 250 MG tablet     morphine (MSIR) 15 MG IR tablet     Nerve Stimulator (NERIVIO) CRISTOPHER     OLANZapine (ZYPREXA) 5 MG tablet     potassium chloride ER (KLOR-CON M) 10 MEQ CR tablet     promethazine (PHENERGAN) 25 MG tablet     propranolol ER (INDERAL LA) 80 MG 24 hr capsule     senna-docusate (SENOKOT-S/PERICOLACE) 8.6-50 MG tablet     traZODone (DESYREL) 50 MG tablet     venlafaxine (EFFEXOR-XR) 75 MG 24 hr capsule     No current facility-administered medications for this visit.        Allergies   Allergen Reactions     Bactrim [Sulfamethoxazole W/Trimethoprim]      Internal bleeding     Copaxone [Glatiramer] Hives          PHYSICAL EXAMINATION:     There were no vitals taken for this visit.  Wt Readings from Last 4 Encounters:   12/14/21 64.6 kg (142 lb 6.4 oz)   12/07/21 65.2 kg (143 lb 12.8 oz)   11/22/21 65.5 kg (144 lb 6.4 oz)   11/16/21 64.9 kg (143 lb)       Constitutional.  Looks well and in no apparent distress.   Eyes.  Without eye redness or apparent jaundice.   Respiratory.  Non labored breathing. Speaking in full sentences.    Skin.  No concerning skin rashes on the skin visualized.   Neurological.  Is alert and oriented.  Psychiatric.  Mood and affect seem appropriate.      The rest of a comprehensive physical examination is deferred due to [facility] video visit restrictions.    Labs and Imaging.    Reviewed.  2/28/2022.  Creatinine 0.72.  Calcium 9.2.  Albumin 3.8.  Somehow the other labs which I had ordered were not done.      12/28/2021.  CBC showed WBC 2.5.  ANC 1.4.  Hemoglobin 10.9.  Platelets 159.  CMP shows albumin 3.2.  Phosphorus 5.4.    CA 27-29 was 1077  CA 27-29 continues to increase and it is 1206 on 12/7/2021    CA 27-29 was 940 on 11/16/2021.  CA 27-29 was 1176 on 9/8/2021.  It was 1023 on 10/19/2021.    9/28/2021.      Iron studies, ferritin is 101, iron 51, TIBC 268 and iron saturation index 19%.    CA 27-29 was 465 on 6/16/2021.  It was 545 on  5/26/2021 12/21/2021.  PET/CT showed progression of bony metastatic disease.  There is increase hypermetabolism in both the right and left iliac bones and the sternum.  Other bone lesions are is stable.    There is new scattered areas of groundglass throughout both the lungs and these findings are indeterminate but may represent an infectious etiology.  Interval resolution of left axillary FDG avid lymphadenopathy.      Biopsy from the right acetabulum shows metastatic lobular carcinoma.  It is triple negative.  Androgen receptor was diffusely positive (90%, moderate intensity) by immunohistochemistry. )  PD-L1 negative.    Foundation one showed CDH1 mutation (initially lobular cancer), CCND1 amplification ( equivocal ). FGF3, FGF4, FGF19 amplification ( Equivocal ). Most promising is CCND1 amplification with abemaciclib showing response in 4/10 patients. FGF3,FGF4 and FGF19 are targetable with pan-FGFR inhibitors.           ASSESSMENT AND PLAN:     Now she has metastatic breast cancer negative breast cancer (androgen receptor positive ) with extensive involvement of the bones.  There is also a left lower lobe hypermetabolic lesion and mediastinal lymph nodes which are hypermetabolic.  The biopsy from the right iliac bone is consistent with metastatic lobular breast cancer but it is hormone receptor and HER-2 negative and PDL 1 is also negative.    Foundation 1 testing did not reveal any actionable mutations.    She was intolerant to Xeloda and progressed on Taxol and eribulin.      We then switched to recently FDA approved sacituzumab govitecan-hziy (Trodelvy) for TNBC, based on the results of the phase II IMMU-132-01 clinical trial.   She started this on 11/11/2020.      We delayed the start of cycle number 3 x 1 week and decrease the dose of chemotherapy by 25% and she also has neutropenia with ANC of 1.  She started cycle number 3-day #1 on 12/29/2020.      After 4 cycles of chemotherapy, overall the PET scan  shows positive response to treatment apart from mildly increased FDG avidity in one of the right iliac bone lesions.  CA 27-29 was 454 slightly elevated from before.    We decided on continuing the same chemotherapy.      She obtained a second opinion from Dr. Castillo from [facility] who recommended a repeat biopsy to be done to make sure that she really has triple negative breast cancer.      She also met with Dr. Arelis Arshad on 3/18/2021.      After 10 cycles of Trodelvy, she had PET/CT on 6/11/2021 which showed mildly increased uptake in some of the bone lesions while stability in other bone lesions.      We discussed the situation in detail.      Because of minimal progression of the disease plus the fact that she had been tolerating treatments well, we decided on continuing the same chemotherapy.     After 15 cycles, repeat PET scan showed stable findings.  CA 27-29 has been increasing and it is 1176.      As she was tolerating the chemotherapy well and her quality of life has been decent and scans were stable although she had a rising CA 27-29, we decided on continuing the same chemotherapy because it has been a very slow progression of the disease.    Now there is clear progression of the disease in the bones.  There is increased FDG uptake in both right and left iliac bones and the sternum.    Foundation one showed CDH1 mutation (initially lobular cancer), CCND1 amplification ( equivocal ). FGF3, FGF4, FGF19 amplification ( Equivocal ). Most promising is CCND1 amplification with abemaciclib showing response in 4/10 patients. FGF3,FGF4 and FGF19 are targetable with pan-FGFR inhibitor    As the tumor is diffusely positive for androgen receptor, we we decided to start her on androgen receptor blockers.    I initially recommended enzalutamide 160 mg daily ( Enzalutamide for the Treatment of Androgen Receptor-Expressing Triple-Negative Breast Cancer----J Clin Oncol. 2018 Mar 20; 36(9): 884-890.  ).    Eventually we decided to start her on Casodex 150 mg daily instead of Enzalutamide due to cost issues.  Clinical Trial Clin Cancer Res. 2013 Oct 1;19(19):5505-12.   Phase II trial of bicalutamide in patients with androgen receptor-positive, estrogen receptor-negative metastatic Breast Cancer  She started Casodex on 1/6/2022.      In the future we can consider Doxil every 4 weeks.       Casodex is going well.  She was supposed to get repeat labs yesterday but somehow these were not drawn.  We will repeat labs in the next few days.  For now she will continue Casodex.        Bone disease.  She has metastatic disease to the bone.  Previously she got palliative radiation to T12-L5 3000 cGy in 10 fractions from 9/6/2019 till 9/18/2019.  She was evaluated by orthopedics Dr. Bipin Elliott on 11/1/2019 and conservative management was recommended.    She was on Zometa every 3 months. She last received on 9/29/2020.  Because of progression of the disease in the bones, we switched to Xgeva which she received on 11/11/2020.  She now has evidence of some progression in the bones.  At this time we will continue with Xgeva.  She last received on 2/28/2022.  Continue vitamin D and calcium.    Cytopenias.  Chemotherapy related.  We will repeat labs     Dry mouth. Keep well hydrated.     Poor appetite. This has stabilized.     Pain management. Now better controlled on buprenorphine patch and occasional MSIR. Follow with Dr. Whitaker.       Bilateral pulmonary emboli.  Tolerating Eliquis for incidentally found PE. Cont the same        We did not address the following today.    Constipation. Continue senna.    Neuropathy.  This remains mild and stable with neuropathy in her hands.    This is in addition to the chronic balance issues and heat and cold sensitivity from underlying multiple sclerosis which is also stable for years.  Continue to monitor.     Subcutaneous nodule in the scalp.  This looks like an epidermoid cyst.  She  thinks it is a stable.  Continue to monitor.       Insomnia.  Continue trazodone.      Wall thickening of esophagus and FDG uptake of fundus of the stomach.  It could be in the setting of inflammation from recent nausea and vomiting.  Symptoms have resolved.  Continue to monitor clinically.    Vision changes.    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  It was thought that this could be due to Taxol as patient reported to the ophthalmologist that she was on Taxol in September 2019 when her symptoms started.  But she was not on Taxol in September 2019 when she started noticing the visual changes so Taxol is not responsible for this.  The first dose of Taxol was on 11/5/2019.   She had left cataract extraction on 2/8/2021 and she has noticed significant improvement in her vision.       Increased FDG ability in the colon.  She had FDG uptake in the cecum and ascending colon but no corresponding lesion was seen on the CT scan.  She is completely asymptomatic so at this time we will continue to observe.    Discussion regarding healthcare directives.  On previous visit she told me that she will bring the health care directive for our records but she forgot so I told her to bring it on next visit.      Breast implant removal.  She tells me that she was planning to do breast implant removal because there was a recall on this.  Her surgery was scheduled for 9/24/2019.  Because of this new development of metastatic breast cancer and her recent radiation, surgery has been canceled.  We discussed that at this time treatment for metastatic breast cancer would take precedence over the other surgery as the other surgery would require her to be off chemotherapy for several weeks and in that case the chances of cancer progression would be high and I would not recommend that.    Multiple sclerosis.  She will continue to follow with her neurologist Dr. Quiles.  Currently multiple sclerosis is  under control and currently she is not taking any medications.    See me back in about 1 month.      All of her questions were answered to her satisfaction.  She is agreeable and comfortable with the plan.    Dewayne Zepeda MD                    Again, thank you for allowing me to participate in the care of your patient.        Sincerely,        Dewayne Zepeda MD

## 2022-03-01 NOTE — PROGRESS NOTES
Left chest port accessed with a 20 gauge 3/4 inch mcnair needle using sterile technique, labs drawn and sent (red/purple/green), flushed with saline and heparin, deacessed with a guaze dressing.    Loren Costa RN

## 2022-03-01 NOTE — PROGRESS NOTES
Shaneka is a 61 year old who is being evaluated via a billable video visit.  Currently in [facility].     How would you like to obtain your AVS? [employer]  If the video visit is dropped, the invitation should be resent by: Text to cell phone: [phone]  Will anyone else be joining your video visit? No    Video Start Time: 8:51 AM  Video-Visit Details    Type of service:  Video Visit    Video End Time:9:02 AM    Originating Location (pt. Location): Home    Distant Location (provider location):  [facility]     Platform used for Video Visit: Franklyn Murdock       ONCOLOGY FOLLOW UP NOTE: 3/01/22        I took the history from reviewing the previous notes that she was following with Dr. Amaya.  I have copied and updated from prior notes.    ONCOLOGY History:    1.  January 2006:  Diagnosed with Stage IIB, T2 N1 M0 invasive lobular carcinoma of the right breast.  Final pathology showed a 4.5 x 3.8 x 2.5 cm, 01/14 lymph nodes positive.  Estrogen, progesterone receptor positive, HER-2 negative.     2.  Genetics.  BRCA1 and 2 mutations not detected. 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       THERAPY TO DATE:   1.  2006:  ECOG 2104 protocol of dose-dense Adriamycin, Cytoxan and Avastin x4 cycles followed by Taxol and Avastin x4 cycles.   2.  03/2007:  Completed 1 year Avastin.   3.  11/2006-01/2007:  Radiotherapy to the right breast of 5040 cGy.   4.  03/[id number]:  Aromasin.  Stopped after moving to the [facility].   5.  01/2016:  Right modified radical mastectomy with latissimus dorsi flap reconstruction and a left prophylactic mastectomy with latissimus dorsi flap reconstruction.     She recently had MRI of the brain as a follow-up for multiple sclerosis and there were multiple calvarial lesions noted which was suspicious for metastatic disease.  There was no evidence of new multiple sclerosis lesions.  She had a PET scan on 8/30/2019 which showed widespread bone  metastatic lesions (calvarium, spine, sacrum, pelvis, ribs most prominent at C7, T3, L1 and L4), hypermetabolic 3 cm lesion in LLL, and mediastinal/hilar LN. CEA wnl at 1.9 and C27-29 elevated to 415 (28 on 8/23/16). A CT guided biopsy of the right iliac bone was obtained on 9/4/19, pathology consistent with metastatic adenocarcinoma (breast), ER/NC negative, HER-2 negative PDL 1 < 1%. A second biopsy was take of the LLL nodule via EBUS on 9/5/19 did not show any malignancy.    C/T/L spine MRI 8/3/19 to 9/2/19 with numerous enhancing lesions of the C, T and L spine, largest around T9 and L1. No evidence of cord compression. Has a L1 compression fracture and impending L4.     Received radiation T12-L5 3000 cGy in 10 fractions from 9/6/2019 till 9/18/2019.  Neurosurgery recommended no surgical intervention but to wear Gorge brace when out of bed and HOB >30 degrees    She started palliative Xeloda 2000/1500 on 10/1/2019 but after just a few doses she noticed nausea, red eyes blurred vision, loss of appetite.  She stopped taking it after 5 doses and was seen by nurse practitioner on 10/7/2019 when she was improving.  At that point she was restarted on Xeloda at a lower dose of 1000 mg twice a day.  As she was not able to tolerate even the lower dose with extreme nausea and vomiting and feeling extremely fatigued and blurry vision, we decided on stopping Xeloda.    We decided on repeating scans and MRI brain on 10/25/2019 showed no intracranial metastatic disease.   Multiple enhancing calvarial lesions may be increased in number and are suggestive of metastatic disease. Thinner imaging on the current study may identify the smaller lesions and may be responsible for  identified more lesions.   There is a single focus of T2 hyperintense signal within the anterior right temporal lobe may represent interval demyelination. There is no evidence for active demyelination.    PET/CT on 11/1/2019 showed favorable response to  therapy and Overall FDG uptake within scattered osseous metastases is decreased since 8/30/2019, particularly within the pelvis. Increased sclerotic appearance of L1 and L4 pathologic compression deformities, likely sequela of recently completed palliative radiation therapy.    No significant FDG uptake in previously demonstrated hypermetabolic mediastinal lymph nodes. Biopsy negative on 9/5/2019.  There is also decreased FDG uptake in the left lower lobe (biopsy negative for  malignancy), now with dense consolidation containing air bronchograms suggestive of infection versus aspiration.  There is diffuse FDG uptake within the esophagus, consistent with esophagitis.    Because of intolerance to Xeloda we decided on switching to weekly paclitaxel on 11/5/2019.    C#2 Taxol 12/4/19- started with 70mg/m2 dose reduction    C#3 Taxol 12/30/2019     PET/CT on 1/24/2020 showed progression of the disease in some of the bone lesions including increase in size and lytic lesion within the vertebral body of C4 measuring 1 cm as opposed to 0.6 cm previously and a new central soft tissue nodule with SUV max 4.1 when previously it was non-hypermetabolic.  There is also some progression noted in the right proximal femur and right iliac bone.  There is decreased metabolic uptake in the right lamina of T9 with SUV max 4.7 when previously it was 8.0.  Other lesions are stable.    CA 27-29 also had increased significantly and it was 257 on 1/28/2020.    We decided on switching to eribulin on 1/28/2020.    C#2 2/18/2020  C#2 D#8 2/25/2020    C#3 D#1 3/18/2020 -delayed by 1 week as per patient's preference because she wanted to visit her family.    She had a repeat PET/CT on 3/27/2020 which showed stable size of the bone metastatic lesions although the FDG avidity was less, consistent with treatment response.    She had MRI of the thoracic spine on 4/3/2020 and it was compared to the one which was done in August 2019 and it showed  increased size of several metastatic lesions most notably at T9 but also at T5-T7.  Other lesions at T10, T11 and T12 appeared improved.    CA 27-29 was also down to 222 on 3/18/2020.    Cycle #4 eribulin ( dose reduced to 1.2 mg/m2 )-4/7/2020-   Cycle #5 Eribulin ( 1.2 mg/m2 )- 4/28/2020   Cycle #6 Eribulin ( 1.2 mg/m2 )- 5/19/2020     Cycle #7 Eribulin ( 1.2 mg/m2 )- 6/9/2020    Cycle #8 Eribulin ( 1.2 mg/m2 )- 6/30/2020     She had a repeat PET scan on 7/17/2020 which showed incidental finding of new acute bilateral pulmonary embolism in the distal left main pulmonary artery extending in the left upper and lower lobes and in the segmental and branch arteries in the right lower lobe.    It also showed mildly increased FDG avidity in the lytic lesion in the T9 lamina and right pedicle with SUV max 5.3, previously 3.9.  That is also slightly increased FDG uptake to the left anterior fifth rib at the costochondral junction SUV max 3.9, previously 2.5.  There is slightly decreased FDG uptake in the right femoral neck lesion SUV max 2.2, previously 2.9.  FDG uptake in other bone lesions is fairly stable.  No new metastasis are seen.    CA 27-29 was 308 on 6/30/2020.  It was 286 on 5/19/2020.    Overall this is consistent with only slight progression versus stable disease.    She was started on Lovenox.    Cycle #9 eribulin 7/21/2020. CA 27-29 was 238    C#10 eribulin 8/18/2020. ( delayed by one week for ANC 0.9 )    C#11 Eribulin 9/8/2020    C#12 Eribulin 9/29/2020     C#13 Eribulin 10/20/2020     PET scan on 11/6/2020 shows progression of the disease with increased FDG uptake in the lytic lesions in the T9/T10 and right posterolateral elements as well as increased FDG uptake in the previously known hypermetabolic right iliac bone lesion suggesting recurrence of previously treated metastasis.  There is new subtle lesion in the right manubrium.  There is also a new fracture in the anterolateral left fourth rib with  associated uptake and this could be a pathologic fracture.  Healing fracture in the left anterior fifth rib lesion.  There is resolution of the left pulmonary emboli.  Decreased FDG uptake of the esophagus consistent with improving inflammation.    CA 27-29 on 10/20/2020 was also elevated at 489.    C#1 Trodelvy on 11/11/2020.  Cycle #2 Trodelvy 12/2/2020  Cycle number 2-day #8 Trodelvy 12/8/2020.    12/15/2020-12/16/2020.  She was admitted to the hospital with nausea vomiting and dehydration.  She had tachycardia and initial lactic acid of 5.  It decreased to 1.4 after 2 L of normal saline.  She also had hypokalemia and ANC was 1.3.  She was treated with IV fluids.  Procalcitonin was negative.  CTA of the chest was negative for pulmonary embolism or pneumonia.  No bacterial infection was documented.  Her medication which she was initially unable to tolerate at home, were restarted and she was discharged home once started to feel better.      We delayed the start of cycle number 3 x 1 week and decrease the dose of chemotherapy by 25%.  She also had neutropenia with ANC of 1.0.      She started cycle number 3-day #1 on 12/29/2020.  CA 27-29 was 392.    Cycle number 3-day #8 1/5/2021.    C#4 Trodelvy 1/20/2021- 75% dose    2/5/2021.  PET/CT overall shows a good response to treatment with improvement of previously hypermetabolic lesions in the spine and pelvis and stability of other bone meta stasis.  There is a single lytic lesion in the right iliac bone which demonstrates slight Yimi increased FDG uptake and has SUV max 4.7 while previously it was SUV max 3.4.  There was diffuse wall thickening of the esophagus with uptake in the esophagus in the fundus of the stomach and this could be seen with inflammation.    Trodelvy cycle #5 - 2/10/2021.  75% of the dose.  CA 27-29 was 337.    Trodelvy cycle #6 - 3/3/2021.  75% of the dose.  Trodelvy cycle #6, D#8 - 3/10/2021.  75% of the dose.    Trodelvy C#8, D#8 on  4/21/2021  CA 27-29 stable at 371 on 4/14/2021    Trodelvy, cycle #9- 5/5/2021        On 2/25/2020 when she had biopsy of the right acetabulum and it was consistent with metastatic lobular carcinoma.  Again it was Triple Negative.     On 3/18/2020 when she also met with Dr. Arelis Arshad who also advised testing for androgen receptor studies on the biopsy specimen.  Tumor was diffusely androgen receptor positive.      5/26/2021-cycle #10 Trodelvy started    CA 27-29 was 545.    6/11/2021.  PET/CT shows mildly increased uptake in several bone lesions for example in the left anterior iliac crest, mid right iliac crest and left ischium.  Uptake in other bone lesions are similar to previously.    Because of minimal progression of the disease and she is tolerating chemotherapy very well, we decided on continuing the same chemotherapy.    6/16/2021-Trodelvy cycle #11    7/7/2021 -Trodelvy cycle #12    9/14/2021-Trodelvy-cycle #15, day #8.    9/8/2021-CA 27-29 was more elevated and it was 1176.    PET/CT on 9/24/2021 showed stable findings with no evidence of FDG avid metastatic disease within the body.  Left axillary lymph nodes are prominent and hypermetabolic and likely from recent vaccinations in the left deltoid muscle.  Healed bony metastasis seems stable.      Cycle #16, Trodelvy 9/28/2021.  Cycle #17, day #8 Trodelvy was on 10/26/2021.  Cycle #18, day #8 Trodelvy on 11/22/2021       She saw Dr. Mejia whom on 10/6/2021.  She was not considered eligible for Enfortumab trial.    Cycle #19, Trodelvy on 12/7/2021 12/21/2021.  PET/CT showed progression of bony metastatic disease.  There is increase hypermetabolism in both the right and left iliac bones and the sternum.  Other bone lesions are stable.    There is new scattered areas of groundglass throughout both the lungs and these findings are indeterminate but may represent an infectious etiology.  Interval resolution of left axillary FDG avid lymphadenopathy.    She  was started on Casodex 150 mg daily on 1/6/2022.    Interval history.  This is a video visit.    She is tolerating Casodex well. Pain is under good control with buprenorphine. Takes MSIR 1-2 tabs/week. No new pain or new swellings.   Energy is decent. Has dry mouth and controlling by drinking more water. No infections or SOB. Neuropathy is mild and same. Weight is stable. Vision is good.    ECOG 1    ROS:  Rest of the comprehensive review of the system was negative.        I reviewed other history in epic as below.       PAST MEDICAL HISTORY:     1.  Breast cancer  2.  Multiple sclerosis.   3.  Depression.   4.  Migraines.   5.  Hypertension.   6.  Cholecystectomy and umbilical hernia repair.     SOCIAL HISTORY: She smoked for 5 years but quit many years ago.  Drinks alcohol socially.  She lives with her .  She is a teacher in middle school.       FAMILY HISTORY: She mentions that her mother had breast cancer at 49.  Both grandmothers had breast cancer but she is not sure of the age.  A couple of cousins have cancers.  Patient has 3 children who are healthy.    Current Outpatient Medications   Medication     buprenorphine (BUTRANS) 5 MCG/HR WK patch     acetaminophen (TYLENOL) 500 MG tablet     albuterol (PROAIR HFA/PROVENTIL HFA/VENTOLIN HFA) 108 (90 Base) MCG/ACT inhaler     bicalutamide (CASODEX) 50 MG tablet     busPIRone (BUSPAR) 15 MG tablet     calcium citrate-vitamin D (CITRACAL) 315-250 MG-UNIT TABS     Cholecalciferol (VITAMIN D3 PO)     ELIQUIS ANTICOAGULANT 5 MG tablet     HEMP OIL OR EXTRACT OR OTHER CBD CANNABINOID, NOT MEDICAL CANNABIS,     ibuprofen (ADVIL/MOTRIN) 600 MG tablet     loratadine (CLARITIN) 10 MG tablet     LORazepam (ATIVAN) 1 MG tablet     magnesium 250 MG tablet     morphine (MSIR) 15 MG IR tablet     Nerve Stimulator (NERIVIO) CRISTOPHER     OLANZapine (ZYPREXA) 5 MG tablet     potassium chloride ER (KLOR-CON M) 10 MEQ CR tablet     promethazine (PHENERGAN) 25 MG tablet      propranolol ER (INDERAL LA) 80 MG 24 hr capsule     senna-docusate (SENOKOT-S/PERICOLACE) 8.6-50 MG tablet     traZODone (DESYREL) 50 MG tablet     venlafaxine (EFFEXOR-XR) 75 MG 24 hr capsule     No current facility-administered medications for this visit.        Allergies   Allergen Reactions     Bactrim [Sulfamethoxazole W/Trimethoprim]      Internal bleeding     Copaxone [Glatiramer] Hives          PHYSICAL EXAMINATION:     There were no vitals taken for this visit.  Wt Readings from Last 4 Encounters:   12/14/21 64.6 kg (142 lb 6.4 oz)   12/07/21 65.2 kg (143 lb 12.8 oz)   11/22/21 65.5 kg (144 lb 6.4 oz)   11/16/21 64.9 kg (143 lb)       Constitutional.  Looks well and in no apparent distress.   Eyes.  Without eye redness or apparent jaundice.   Respiratory.  Non labored breathing. Speaking in full sentences.    Skin.  No concerning skin rashes on the skin visualized.   Neurological.  Is alert and oriented.  Psychiatric.  Mood and affect seem appropriate.      The rest of a comprehensive physical examination is deferred due to Public Health Emergency video visit restrictions.    Labs and Imaging.    Reviewed.  2/28/2022.  Creatinine 0.72.  Calcium 9.2.  Albumin 3.8.  Somehow the other labs which I had ordered were not done.      12/28/2021.  CBC showed WBC 2.5.  ANC 1.4.  Hemoglobin 10.9.  Platelets 159.  CMP shows albumin 3.2.  Phosphorus 5.4.    CA 27-29 was 1077  CA 27-29 continues to increase and it is 1206 on 12/7/2021    CA 27-29 was 940 on 11/16/2021.  CA 27-29 was 1176 on 9/8/2021.  It was 1023 on 10/19/2021.    9/28/2021.      Iron studies, ferritin is 101, iron 51, TIBC 268 and iron saturation index 19%.    CA 27-29 was 465 on 6/16/2021.  It was 545 on 5/26/2021 12/21/2021.  PET/CT showed progression of bony metastatic disease.  There is increase hypermetabolism in both the right and left iliac bones and the sternum.  Other bone lesions are is stable.    There is new scattered areas of groundglass  throughout both the lungs and these findings are indeterminate but may represent an infectious etiology.  Interval resolution of left axillary FDG avid lymphadenopathy.      Biopsy from the right acetabulum shows metastatic lobular carcinoma.  It is triple negative.  Androgen receptor was diffusely positive (90%, moderate intensity) by immunohistochemistry. )  PD-L1 negative.    Foundation one showed CDH1 mutation (initially lobular cancer), CCND1 amplification ( equivocal ). FGF3, FGF4, FGF19 amplification ( Equivocal ). Most promising is CCND1 amplification with abemaciclib showing response in 4/10 patients. FGF3,FGF4 and FGF19 are targetable with pan-FGFR inhibitors.           ASSESSMENT AND PLAN:     Now she has metastatic breast cancer negative breast cancer (androgen receptor positive ) with extensive involvement of the bones.  There is also a left lower lobe hypermetabolic lesion and mediastinal lymph nodes which are hypermetabolic.  The biopsy from the right iliac bone is consistent with metastatic lobular breast cancer but it is hormone receptor and HER-2 negative and PDL 1 is also negative.    Foundation 1 testing did not reveal any actionable mutations.    She was intolerant to Xeloda and progressed on Taxol and eribulin.      We then switched to recently FDA approved sacituzumab govitecan-hziy (Trodelvy) for TNBC, based on the results of the phase II IMMU-132-01 clinical trial.   She started this on 11/11/2020.      We delayed the start of cycle number 3 x 1 week and decrease the dose of chemotherapy by 25% and she also has neutropenia with ANC of 1.  She started cycle number 3-day #1 on 12/29/2020.      After 4 cycles of chemotherapy, overall the PET scan shows positive response to treatment apart from mildly increased FDG avidity in one of the right iliac bone lesions.  CA 27-29 was 454 slightly elevated from before.    We decided on continuing the same chemotherapy.      She obtained a second opinion  from Dr. Castillo from [facility] who recommended a repeat biopsy to be done to make sure that she really has triple negative breast cancer.      She also met with Dr. Arelis Arshad on 3/18/2021.      After 10 cycles of Trodelvy, she had PET/CT on 6/11/2021 which showed mildly increased uptake in some of the bone lesions while stability in other bone lesions.      We discussed the situation in detail.      Because of minimal progression of the disease plus the fact that she had been tolerating treatments well, we decided on continuing the same chemotherapy.     After 15 cycles, repeat PET scan showed stable findings.  CA 27-29 has been increasing and it is 1176.      As she was tolerating the chemotherapy well and her quality of life has been decent and scans were stable although she had a rising CA 27-29, we decided on continuing the same chemotherapy because it has been a very slow progression of the disease.    Now there is clear progression of the disease in the bones.  There is increased FDG uptake in both right and left iliac bones and the sternum.    Foundation one showed CDH1 mutation (initially lobular cancer), CCND1 amplification ( equivocal ). FGF3, FGF4, FGF19 amplification ( Equivocal ). Most promising is CCND1 amplification with abemaciclib showing response in 4/10 patients. FGF3,FGF4 and FGF19 are targetable with pan-FGFR inhibitor    As the tumor is diffusely positive for androgen receptor, we we decided to start her on androgen receptor blockers.    I initially recommended enzalutamide 160 mg daily ( Enzalutamide for the Treatment of Androgen Receptor-Expressing Triple-Negative Breast Cancer----J Clin Oncol. 2018 Mar 20; 36(9): 884-890. ).    Eventually we decided to start her on Casodex 150 mg daily instead of Enzalutamide due to cost issues.  Clinical Trial Clin Cancer Res. 2013 Oct 1;19(19):9354-12.   Phase II trial of bicalutamide in patients with androgen receptor-positive, estrogen  receptor-negative metastatic Breast Cancer  She started Casodex on 1/6/2022.      In the future we can consider Doxil every 4 weeks.       Casodex is going well.  She was supposed to get repeat labs yesterday but somehow these were not drawn.  We will repeat labs in the next few days.  For now she will continue Casodex.        Bone disease.  She has metastatic disease to the bone.  Previously she got palliative radiation to T12-L5 3000 cGy in 10 fractions from 9/6/2019 till 9/18/2019.  She was evaluated by orthopedics Dr. Bipin Elliott on 11/1/2019 and conservative management was recommended.    She was on Zometa every 3 months. She last received on 9/29/2020.  Because of progression of the disease in the bones, we switched to Xgeva which she received on 11/11/2020.  She now has evidence of some progression in the bones.  At this time we will continue with Xgeva.  She last received on 2/28/2022.  Continue vitamin D and calcium.    Cytopenias.  Chemotherapy related.  We will repeat labs     Dry mouth. Keep well hydrated.     Poor appetite. This has stabilized.     Pain management. Now better controlled on buprenorphine patch and occasional MSIR. Follow with Dr. Whitaker.       Bilateral pulmonary emboli.  Tolerating Eliquis for incidentally found PE. Cont the same        We did not address the following today.    Constipation. Continue senna.    Neuropathy.  This remains mild and stable with neuropathy in her hands.    This is in addition to the chronic balance issues and heat and cold sensitivity from underlying multiple sclerosis which is also stable for years.  Continue to monitor.     Subcutaneous nodule in the scalp.  This looks like an epidermoid cyst.  She thinks it is a stable.  Continue to monitor.       Insomnia.  Continue trazodone.      Wall thickening of esophagus and FDG uptake of fundus of the stomach.  It could be in the setting of inflammation from recent nausea and vomiting.  Symptoms have  resolved.  Continue to monitor clinically.    Vision changes.    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  It was thought that this could be due to Taxol as patient reported to the ophthalmologist that she was on Taxol in September 2019 when her symptoms started.  But she was not on Taxol in September 2019 when she started noticing the visual changes so Taxol is not responsible for this.  The first dose of Taxol was on 11/5/2019.   She had left cataract extraction on 2/8/2021 and she has noticed significant improvement in her vision.       Increased FDG ability in the colon.  She had FDG uptake in the cecum and ascending colon but no corresponding lesion was seen on the CT scan.  She is completely asymptomatic so at this time we will continue to observe.    Discussion regarding healthcare directives.  On previous visit she told me that she will bring the health care directive for our records but she forgot so I told her to bring it on next visit.      Breast implant removal.  She tells me that she was planning to do breast implant removal because there was a recall on this.  Her surgery was scheduled for 9/24/2019.  Because of this new development of metastatic breast cancer and her recent radiation, surgery has been canceled.  We discussed that at this time treatment for metastatic breast cancer would take precedence over the other surgery as the other surgery would require her to be off chemotherapy for several weeks and in that case the chances of cancer progression would be high and I would not recommend that.    Multiple sclerosis.  She will continue to follow with her neurologist Dr. Quiles.  Currently multiple sclerosis is under control and currently she is not taking any medications.    See me back in about 1 month.      All of her questions were answered to her satisfaction.  She is agreeable and comfortable with the plan.    Dewayne Zepeda MD

## 2022-03-01 NOTE — PATIENT INSTRUCTIONS
Labs in the next few days  Cont Casodex  Continue monthly Xgeva. Continue calcium and vitamin D.    See me back in 1 month with labs prior

## 2022-03-02 ENCOUNTER — TELEPHONE (OUTPATIENT)
Dept: FAMILY MEDICINE | Facility: OTHER | Age: 62
End: 2022-03-02
Payer: MEDICARE

## 2022-03-04 ENCOUNTER — MYC REFILL (OUTPATIENT)
Dept: PALLIATIVE CARE | Facility: CLINIC | Age: 62
End: 2022-03-04
Payer: MEDICARE

## 2022-03-04 DIAGNOSIS — C79.51 BONE METASTASES: ICD-10-CM

## 2022-03-04 DIAGNOSIS — G89.3 CANCER ASSOCIATED PAIN: ICD-10-CM

## 2022-03-04 RX ORDER — BUPRENORPHINE 5 UG/H
1 PATCH TRANSDERMAL
Qty: 4 PATCH | Refills: 0 | Status: SHIPPED | OUTPATIENT
Start: 2022-03-04 | End: 2022-03-30

## 2022-03-04 NOTE — TELEPHONE ENCOUNTER
Received Gratat message from patient requesting refill of buprenorphine.     Last refill: 2/4/22  Last office visit: 2/9/22  Scheduled for follow up 5/11/22     Will route request to MD for review.     Reviewed [place]  Report.

## 2022-03-07 DIAGNOSIS — C79.51 BONE METASTASES: ICD-10-CM

## 2022-03-07 DIAGNOSIS — C50.919 METASTATIC BREAST CANCER: Primary | ICD-10-CM

## 2022-03-08 ENCOUNTER — CARE COORDINATION (OUTPATIENT)
Dept: ONCOLOGY | Facility: CLINIC | Age: 62
End: 2022-03-08
Payer: MEDICARE

## 2022-03-08 NOTE — PROGRESS NOTES
Dr Zepeda sent inpfwaterworkset message to this nurse to get Shaneka scheduled for PET CT scan prior to his visit on 4/5/22.    Called Shaneka to notify her of this. [facility] as she wanted this done at [facility] if possible.    Keira Brooks RN

## 2022-03-17 ENCOUNTER — APPOINTMENT (OUTPATIENT)
Dept: CT IMAGING | Facility: CLINIC | Age: 62
DRG: 542 | End: 2022-03-17
Attending: PHYSICIAN ASSISTANT
Payer: MEDICARE

## 2022-03-17 ENCOUNTER — HOSPITAL ENCOUNTER (INPATIENT)
Facility: CLINIC | Age: 62
LOS: 2 days | Discharge: HOME | DRG: 542 | End: 2022-03-19
Attending: PHYSICIAN ASSISTANT | Admitting: INTERNAL MEDICINE
Payer: MEDICARE

## 2022-03-17 ENCOUNTER — TELEPHONE (OUTPATIENT)
Dept: ONCOLOGY | Facility: CLINIC | Age: 62
End: 2022-03-17
Payer: MEDICARE

## 2022-03-17 DIAGNOSIS — E87.1 HYPONATREMIA: ICD-10-CM

## 2022-03-17 DIAGNOSIS — M54.50 LOW BACK PAIN, UNSPECIFIED BACK PAIN LATERALITY, UNSPECIFIED CHRONICITY, UNSPECIFIED WHETHER SCIATICA PRESENT: ICD-10-CM

## 2022-03-17 DIAGNOSIS — G89.3 CANCER ASSOCIATED PAIN: ICD-10-CM

## 2022-03-17 DIAGNOSIS — C50.919 METASTATIC BREAST CANCER: ICD-10-CM

## 2022-03-17 DIAGNOSIS — C79.51 BONE METASTASIS: ICD-10-CM

## 2022-03-17 DIAGNOSIS — N17.9 ACUTE KIDNEY INJURY (H): ICD-10-CM

## 2022-03-17 DIAGNOSIS — C79.51 BONY METASTASIS: ICD-10-CM

## 2022-03-17 DIAGNOSIS — R11.10 VOMITING: ICD-10-CM

## 2022-03-17 DIAGNOSIS — A41.9 SEPSIS (H): ICD-10-CM

## 2022-03-17 DIAGNOSIS — S32.009A CLOSED FRACTURE OF PEDICLE OF LUMBAR VERTEBRA, INITIAL ENCOUNTER (H): ICD-10-CM

## 2022-03-17 DIAGNOSIS — M54.50 ACUTE MIDLINE LOW BACK PAIN, UNSPECIFIED WHETHER SCIATICA PRESENT: ICD-10-CM

## 2022-03-17 DIAGNOSIS — R11.10 HABIT VOMITING: ICD-10-CM

## 2022-03-17 DIAGNOSIS — Z11.52 ENCOUNTER FOR SCREENING LABORATORY TESTING FOR SEVERE ACUTE RESPIRATORY SYNDROME CORONAVIRUS 2 (SARS-COV-2): ICD-10-CM

## 2022-03-17 DIAGNOSIS — E87.6 HYPOPOTASSEMIA: ICD-10-CM

## 2022-03-17 DIAGNOSIS — E87.6 HYPOKALEMIA: ICD-10-CM

## 2022-03-17 LAB
ALBUMIN SERPL-MCNC: 4.6 G/DL (ref 3.4–5)
ALBUMIN UR-MCNC: NEGATIVE MG/DL
ALP SERPL-CCNC: 79 U/L (ref 40–150)
ALT SERPL W P-5'-P-CCNC: 28 U/L (ref 0–50)
ANION GAP SERPL CALCULATED.3IONS-SCNC: 15 MMOL/L (ref 3–14)
APPEARANCE UR: CLEAR
AST SERPL W P-5'-P-CCNC: 53 U/L (ref 0–45)
BACTERIA #/AREA URNS HPF: ABNORMAL /HPF
BASOPHILS # BLD AUTO: 0 10E3/UL (ref 0–0.2)
BASOPHILS NFR BLD AUTO: 0 %
BILIRUB SERPL-MCNC: 1.1 MG/DL (ref 0.2–1.3)
BILIRUB UR QL STRIP: NEGATIVE
BUN SERPL-MCNC: 28 MG/DL (ref 7–30)
CALCIUM SERPL-MCNC: 10.3 MG/DL (ref 8.5–10.1)
CHLORIDE BLD-SCNC: 91 MMOL/L (ref 94–109)
CO2 SERPL-SCNC: 24 MMOL/L (ref 20–32)
COLOR UR AUTO: COLORLESS
CREAT SERPL-MCNC: 1.06 MG/DL (ref 0.52–1.04)
CRP SERPL-MCNC: 3.9 MG/L (ref 0–8)
EOSINOPHIL # BLD AUTO: 0 10E3/UL (ref 0–0.7)
EOSINOPHIL NFR BLD AUTO: 0 %
ERYTHROCYTE [DISTWIDTH] IN BLOOD BY AUTOMATED COUNT: 14 % (ref 10–15)
GFR SERPL CREATININE-BSD FRML MDRD: 60 ML/MIN/1.73M2
GLUCOSE BLD-MCNC: 119 MG/DL (ref 70–99)
GLUCOSE UR STRIP-MCNC: NEGATIVE MG/DL
HCT VFR BLD AUTO: 39.7 % (ref 35–47)
HGB BLD-MCNC: 13.8 G/DL (ref 11.7–15.7)
HGB UR QL STRIP: ABNORMAL
IMM GRANULOCYTES # BLD: 0.1 10E3/UL
IMM GRANULOCYTES NFR BLD: 1 %
KETONES UR STRIP-MCNC: NEGATIVE MG/DL
LACTATE SERPL-SCNC: 3.1 MMOL/L (ref 0.7–2)
LEUKOCYTE ESTERASE UR QL STRIP: NEGATIVE
LYMPHOCYTES # BLD AUTO: 1.1 10E3/UL (ref 0.8–5.3)
LYMPHOCYTES NFR BLD AUTO: 8 %
MCH RBC QN AUTO: 27.6 PG (ref 26.5–33)
MCHC RBC AUTO-ENTMCNC: 34.8 G/DL (ref 31.5–36.5)
MCV RBC AUTO: 79 FL (ref 78–100)
MONOCYTES # BLD AUTO: 2.4 10E3/UL (ref 0–1.3)
MONOCYTES NFR BLD AUTO: 17 %
NEUTROPHILS # BLD AUTO: 10.4 10E3/UL (ref 1.6–8.3)
NEUTROPHILS NFR BLD AUTO: 74 %
NITRATE UR QL: NEGATIVE
NRBC # BLD AUTO: 0 10E3/UL
NRBC BLD AUTO-RTO: 0 /100
PH UR STRIP: 8 [PH] (ref 5–7)
PLATELET # BLD AUTO: 282 10E3/UL (ref 150–450)
POTASSIUM BLD-SCNC: 2.7 MMOL/L (ref 3.4–5.3)
PROT SERPL-MCNC: 8.8 G/DL (ref 6.8–8.8)
RBC # BLD AUTO: 5 10E6/UL (ref 3.8–5.2)
RBC URINE: 1 /HPF
SARS-COV-2 RNA RESP QL NAA+PROBE: NEGATIVE
SODIUM SERPL-SCNC: 130 MMOL/L (ref 133–144)
SP GR UR STRIP: 1.02 (ref 1–1.03)
SQUAMOUS EPITHELIAL: <1 /HPF
UROBILINOGEN UR STRIP-MCNC: NORMAL MG/DL
WBC # BLD AUTO: 14 10E3/UL (ref 4–11)
WBC URINE: 5 /HPF

## 2022-03-17 PROCEDURE — 99285 EMERGENCY DEPT VISIT HI MDM: CPT | Performed by: FAMILY MEDICINE

## 2022-03-17 PROCEDURE — 250N000011 HC RX IP 250 OP 636: Performed by: PHYSICIAN ASSISTANT

## 2022-03-17 PROCEDURE — 87635 SARS-COV-2 COVID-19 AMP PRB: CPT | Performed by: PHYSICIAN ASSISTANT

## 2022-03-17 PROCEDURE — 96365 THER/PROPH/DIAG IV INF INIT: CPT | Mod: 59 | Performed by: FAMILY MEDICINE

## 2022-03-17 PROCEDURE — 96374 THER/PROPH/DIAG INJ IV PUSH: CPT | Mod: 59 | Performed by: FAMILY MEDICINE

## 2022-03-17 PROCEDURE — 99285 EMERGENCY DEPT VISIT HI MDM: CPT | Mod: 25 | Performed by: FAMILY MEDICINE

## 2022-03-17 PROCEDURE — 258N000003 HC RX IP 258 OP 636: Performed by: PHYSICIAN ASSISTANT

## 2022-03-17 PROCEDURE — 86140 C-REACTIVE PROTEIN: CPT | Performed by: PHYSICIAN ASSISTANT

## 2022-03-17 PROCEDURE — 74177 CT ABD & PELVIS W/CONTRAST: CPT | Mod: ME

## 2022-03-17 PROCEDURE — C9803 HOPD COVID-19 SPEC COLLECT: HCPCS | Performed by: FAMILY MEDICINE

## 2022-03-17 PROCEDURE — 96367 TX/PROPH/DG ADDL SEQ IV INF: CPT | Performed by: FAMILY MEDICINE

## 2022-03-17 PROCEDURE — 250N000009 HC RX 250: Performed by: PHYSICIAN ASSISTANT

## 2022-03-17 PROCEDURE — 72131 CT LUMBAR SPINE W/O DYE: CPT

## 2022-03-17 PROCEDURE — 36415 COLL VENOUS BLD VENIPUNCTURE: CPT | Performed by: PHYSICIAN ASSISTANT

## 2022-03-17 PROCEDURE — 81001 URINALYSIS AUTO W/SCOPE: CPT | Performed by: PHYSICIAN ASSISTANT

## 2022-03-17 PROCEDURE — 120N000001 HC R&B MED SURG/OB

## 2022-03-17 PROCEDURE — 96375 TX/PRO/DX INJ NEW DRUG ADDON: CPT | Performed by: FAMILY MEDICINE

## 2022-03-17 PROCEDURE — 80053 COMPREHEN METABOLIC PANEL: CPT | Performed by: PHYSICIAN ASSISTANT

## 2022-03-17 PROCEDURE — G0378 HOSPITAL OBSERVATION PER HR: HCPCS

## 2022-03-17 PROCEDURE — 96376 TX/PRO/DX INJ SAME DRUG ADON: CPT

## 2022-03-17 PROCEDURE — 96367 TX/PROPH/DG ADDL SEQ IV INF: CPT

## 2022-03-17 PROCEDURE — 96361 HYDRATE IV INFUSION ADD-ON: CPT | Performed by: FAMILY MEDICINE

## 2022-03-17 PROCEDURE — 83605 ASSAY OF LACTIC ACID: CPT | Performed by: PHYSICIAN ASSISTANT

## 2022-03-17 PROCEDURE — 87040 BLOOD CULTURE FOR BACTERIA: CPT | Performed by: PHYSICIAN ASSISTANT

## 2022-03-17 PROCEDURE — 85025 COMPLETE CBC W/AUTO DIFF WBC: CPT | Performed by: PHYSICIAN ASSISTANT

## 2022-03-17 RX ORDER — POTASSIUM CHLORIDE 7.45 MG/ML
10 INJECTION INTRAVENOUS
Status: COMPLETED | OUTPATIENT
Start: 2022-03-17 | End: 2022-03-18

## 2022-03-17 RX ORDER — METOCLOPRAMIDE HYDROCHLORIDE 5 MG/ML
5 INJECTION INTRAMUSCULAR; INTRAVENOUS ONCE
Status: COMPLETED | OUTPATIENT
Start: 2022-03-17 | End: 2022-03-17

## 2022-03-17 RX ORDER — VANCOMYCIN HYDROCHLORIDE 1 G/200ML
1000 INJECTION, SOLUTION INTRAVENOUS
Status: DISCONTINUED | OUTPATIENT
Start: 2022-03-17 | End: 2022-03-18

## 2022-03-17 RX ORDER — HYDROMORPHONE HYDROCHLORIDE 1 MG/ML
0.5 INJECTION, SOLUTION INTRAMUSCULAR; INTRAVENOUS; SUBCUTANEOUS
Status: DISCONTINUED | OUTPATIENT
Start: 2022-03-17 | End: 2022-03-18

## 2022-03-17 RX ORDER — SODIUM CHLORIDE 9 MG/ML
INJECTION, SOLUTION INTRAVENOUS CONTINUOUS
Status: DISCONTINUED | OUTPATIENT
Start: 2022-03-17 | End: 2022-03-19 | Stop reason: HOSPADM

## 2022-03-17 RX ORDER — IOPAMIDOL 755 MG/ML
100 INJECTION, SOLUTION INTRAVASCULAR ONCE
Status: COMPLETED | OUTPATIENT
Start: 2022-03-17 | End: 2022-03-17

## 2022-03-17 RX ORDER — ENZALUTAMIDE 40 MG/1
CAPSULE ORAL
Status: ON HOLD | COMMUNITY
Start: 2021-12-28 | End: 2022-03-18

## 2022-03-17 RX ORDER — ONDANSETRON 2 MG/ML
4 INJECTION INTRAMUSCULAR; INTRAVENOUS EVERY 30 MIN PRN
Status: DISCONTINUED | OUTPATIENT
Start: 2022-03-17 | End: 2022-03-17 | Stop reason: CLARIF

## 2022-03-17 RX ADMIN — HYDROMORPHONE HYDROCHLORIDE 1 MG: 1 INJECTION, SOLUTION INTRAMUSCULAR; INTRAVENOUS; SUBCUTANEOUS at 19:22

## 2022-03-17 RX ADMIN — POTASSIUM CHLORIDE 10 MEQ: 7.46 INJECTION, SOLUTION INTRAVENOUS at 22:06

## 2022-03-17 RX ADMIN — IOPAMIDOL 81 ML: 755 INJECTION, SOLUTION INTRAVENOUS at 20:46

## 2022-03-17 RX ADMIN — POTASSIUM CHLORIDE 10 MEQ: 7.46 INJECTION, SOLUTION INTRAVENOUS at 23:20

## 2022-03-17 RX ADMIN — TAZOBACTAM SODIUM AND PIPERACILLIN SODIUM 4.5 G: 500; 4 INJECTION, SOLUTION INTRAVENOUS at 20:29

## 2022-03-17 RX ADMIN — SODIUM CHLORIDE 60 ML: 9 INJECTION, SOLUTION INTRAVENOUS at 20:46

## 2022-03-17 RX ADMIN — SODIUM CHLORIDE 1000 ML: 9 INJECTION, SOLUTION INTRAVENOUS at 21:10

## 2022-03-17 RX ADMIN — SODIUM CHLORIDE: 9 INJECTION, SOLUTION INTRAVENOUS at 23:58

## 2022-03-17 RX ADMIN — VANCOMYCIN HYDROCHLORIDE 1000 MG: 1 INJECTION, SOLUTION INTRAVENOUS at 21:11

## 2022-03-17 RX ADMIN — HYDROMORPHONE HYDROCHLORIDE 0.5 MG: 1 INJECTION, SOLUTION INTRAMUSCULAR; INTRAVENOUS; SUBCUTANEOUS at 22:07

## 2022-03-17 RX ADMIN — METOCLOPRAMIDE HYDROCHLORIDE 5 MG: 5 INJECTION INTRAMUSCULAR; INTRAVENOUS at 19:18

## 2022-03-17 RX ADMIN — SODIUM CHLORIDE 1000 ML: 9 INJECTION, SOLUTION INTRAVENOUS at 18:52

## 2022-03-17 ASSESSMENT — ACTIVITIES OF DAILY LIVING (ADL): ADLS_ACUITY_SCORE: 8

## 2022-03-17 NOTE — ED PROVIDER NOTES
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  Patient reports she has not had a bowel movement for the last 4 days.  She has had some lower abdominal pain and bloating.  Also with nausea and vomiting the last couple days.  She has not vomited today much but also not eating much.  Tolerating liquids.  At one point with vomiting she felt a pop in her back and developed severe low back pain.  She had associated tingling in all of her extremities and face that comes and goes.  The tingling has progressed to numbness and she feels weak in her legs.  Pain in her back is now unbearable.  She takes morphine for pain but that is not helping.  She has not had any fevers or urinary symptoms with this.  No chest pain or shortness of breath.  She was taking MiraLAX for the constipation without relief.  She is here with her son.        Allergies:  Allergies   Allergen Reactions     Bactrim [Sulfamethoxazole W/Trimethoprim]      Internal bleeding     Copaxone [Glatiramer] Hives       Problem List:    Patient Active Problem List    Diagnosis Date Noted     Hyponatremia 03/17/2022     Priority: Medium     Vomiting 03/17/2022     Priority: Medium     Bony metastasis (H) 03/17/2022     Priority: Medium     Acute kidney injury (H) 03/17/2022     Priority: Medium     Sepsis (H) 03/17/2022     Priority: Medium     Closed fracture of pedicle of lumbar vertebra, initial encounter (H) 03/17/2022     Priority: Medium     Acute midline low back pain, unspecified whether sciatica present 03/17/2022     Priority: Medium     Anemia, unspecified type 10/18/2021     Priority: Medium     Hyperphosphatemia 07/28/2021     Priority: Medium     Drug-induced polyneuropathy (H)  02/18/2021     Priority: Medium     Cancer related pain 02/03/2021     Priority: Medium     Posterior subcapsular polar age-related cataract of both eyes 01/29/2021     Priority: Medium     Added automatically from request for surgery [id number]       Iris synechiae, bilateral 01/29/2021     Priority: Medium     Added automatically from request for surgery [id number]       Dehydration 12/15/2020     Priority: Medium     Chemotherapy induced nausea and vomiting 12/15/2020     Priority: Medium     Sinus tachycardia 12/15/2020     Priority: Medium     Drug-induced nausea and vomiting 12/15/2020     Priority: Medium     Chemotherapy-induced neutropenia (H) 11/10/2020     Priority: Medium     Pulmonary embolism without acute cor pulmonale, unspecified chronicity, unspecified pulmonary embolism type (H) 08/17/2020     Priority: Medium     Hypokalemia 07/28/2020     Priority: Medium     Bone metastases (H) 10/22/2019     Priority: Medium     Metastatic breast cancer (H) 09/18/2019     Priority: Medium     Metastatic disease (H) 08/31/2019     Priority: Medium     S/P breast reconstruction, bilateral 07/31/2018     Priority: Medium     Anxiety and depression 10/25/2017     Priority: Medium     Hx of breast cancer 10/25/2017     Priority: Medium     Major depressive disorder, recurrent episode, moderate (H) 04/07/2016     Priority: Medium     Anxiety 03/12/2016     Priority: Medium     Migraine without aura and without status migrainosus, not intractable 10/23/2015     Priority: Medium     Obesity, Class II, BMI 35-39.9 10/23/2015     Priority: Medium     Multiple sclerosis (H) 08/11/2015     Priority: Medium     CARDIOVASCULAR SCREENING; LDL GOAL LESS THAN 160 08/11/2015     Priority: Medium     Raynaud's syndrome 08/11/2015     Priority: Medium     Breast cancer (H) 08/11/2015     Priority: Medium     Age 44       Complication of anesthesia 08/11/2015     Priority: Medium     Arthritis 08/11/2015     Priority: Medium      Autoimmune disorder (H) 08/11/2015     Priority: Medium     Other insomnia 08/11/2015     Priority: Medium     Medication management contract agreement 08/11/2015     Priority: Medium     Ambien 12.5 mg, dispense 30 per month, follow up every 6 months        Neurogenic bladder 12/22/2014     Priority: Medium     Vision changes 12/22/2014     Priority: Medium     Demyelinating disorder (H) 12/22/2014     Priority: Medium     Weakness 11/20/2014     Priority: Medium     GERD (gastroesophageal reflux disease) 10/22/2014     Priority: Medium     History of breast cancer 10/22/2014     Priority: Medium     Overview:   stage 3, diagnosed in 2006 s/p double mastectomy, chemo and radiation.        Migraine 10/22/2014     Priority: Medium        Past Medical History:    Past Medical History:   Diagnosis Date     Breast cancer (H)      Cataract      Chemotherapy induced nausea and vomiting 12/15/2020     Common migraine      Esophageal reflux      H/O bilateral mastectomy      Mild major depression (H)      Multiple sclerosis (H)      Pulmonary embolism (H)        Past Surgical History:    Past Surgical History:   Procedure Laterality Date     CHOLECYSTECTOMY       ENDOBRONCHIAL ULTRASOUND FLEXIBLE N/A 09/05/2019    Procedure: Flexible Bronchoscopy, Endobronchial Ultrasound, Radial Probe;  Surgeon: Sree Sanchez MD;  Location: [facility] OR     HC REMOVAL OF OVARY/TUBE(S)       HERNIA REPAIR, UMBILICAL       mastectomy  Bilateral 2006     MASTECTOMY, BILATERAL       PHACOEMULSIFICATION CLEAR CORNEA WITH TORIC INTRAOCULAR LENS IMPLANT Left 2/8/2021    Procedure: PHACOEMULSIFICATION, COMPLEX CATARACT, WITH INTRAOCULAR LENS IMPLANT, RESIDENT TORIC LENS LEFT;  Surgeon: Luis Barkley MD;  Location: [facility] OR     PHACOEMULSIFICATION CLEAR CORNEA WITH TORIC INTRAOCULAR LENS IMPLANT Right 3/8/2021    Procedure: PHACOEMULSIFICATION, CATARACT, WITH INTRAOCULAR LENS IMPLANT, TORIC LENS RIGHT;  Surgeon: Luis Barkley MD;   Location: [facility] OR       Family History:    Family History   Problem Relation Age of Onset     Breast Cancer Maternal Aunt 50         at 85     Breast Cancer Cousin 40     Breast Cancer Mother 49        2nd primary, contralateral breast at 69;  at 86     Melanoma Mother      Breast Cancer Maternal Grandmother 40     Ovarian Cancer Maternal Grandmother      Breast Cancer Paternal Grandmother 50         at 60     Brain Cancer Cousin 20     Breast Cancer Paternal Aunt 50         at 60     Breast Cancer Cousin 45        paternal cousin     Anesthesia Reaction No family hx of      Deep Vein Thrombosis No family hx of        Social History:  Marital Status:   [2]  Social History     Tobacco Use     Smoking status: Former Smoker     Types: Cigarettes     Quit date: 2005     Years since quittin.3     Smokeless tobacco: Never Used   Vaping Use     Vaping Use: Never used   Substance Use Topics     Alcohol use: Not Currently     Alcohol/week: 2.0 standard drinks     Types: 1 Glasses of wine, 1 Cans of beer per week     Drug use: No        Medications:    albuterol (PROAIR HFA/PROVENTIL HFA/VENTOLIN HFA) 108 (90 Base) MCG/ACT inhaler  bicalutamide (CASODEX) 50 MG tablet  busPIRone (BUSPAR) 15 MG tablet  calcium citrate-vitamin D (CITRACAL) 315-250 MG-UNIT TABS  Cholecalciferol (VITAMIN D3 PO)  ELIQUIS ANTICOAGULANT 5 MG tablet  ibuprofen (ADVIL/MOTRIN) 600 MG tablet  loratadine (CLARITIN) 10 MG tablet  LORazepam (ATIVAN) 1 MG tablet  magnesium 250 MG tablet  morphine (MSIR) 15 MG IR tablet  potassium chloride ER (KLOR-CON M) 10 MEQ CR tablet  promethazine (PHENERGAN) 25 MG tablet  propranolol ER (INDERAL LA) 80 MG 24 hr capsule  senna-docusate (SENOKOT-S/PERICOLACE) 8.6-50 MG tablet  traZODone (DESYREL) 50 MG tablet  venlafaxine (EFFEXOR-XR) 75 MG 24 hr capsule  XTANDI 40 MG capsule  acetaminophen (TYLENOL) 500 MG tablet  buprenorphine (BUTRANS) 5 MCG/HR WK patch  HEMP OIL OR EXTRACT OR OTHER CBD  CANNABINOID, NOT MEDICAL CANNABIS,  Nerve Stimulator (NERIVIO) CRISTOPHER          Review of Systems   All other systems reviewed and are negative.      Physical Exam   BP: (!) 145/120  Pulse: 114  Temp: 98.3  F (36.8  C)  Resp: 18  Weight: 63.5 kg (140 lb)  SpO2: 97 %      Physical Exam  Vitals and nursing note reviewed.   Constitutional:       General: She is in acute distress.      Appearance: Normal appearance. She is ill-appearing. She is not toxic-appearing or diaphoretic.      Comments: Appears in acute pain, very uncomfortable   HENT:      Head: Normocephalic and atraumatic.      Mouth/Throat:      Comments: Tacky mucous membranes  Eyes:      Extraocular Movements: Extraocular movements intact.      Pupils: Pupils are equal, round, and reactive to light.      Comments: Left eye with subconjunctival hemorrhage.  Son states that occurred a few days ago, prior to vomiting onset   Cardiovascular:      Rate and Rhythm: Regular rhythm. Tachycardia present.      Heart sounds: Normal heart sounds.   Pulmonary:      Effort: Pulmonary effort is normal. No respiratory distress.      Breath sounds: Normal breath sounds.   Abdominal:      General: Bowel sounds are decreased.      Tenderness: There is generalized abdominal tenderness.   Musculoskeletal:         General: No deformity.      Cervical back: Neck supple.      Comments: Diffuse pain with palpation across the spine but more significant tenderness to lower lumbar spine region into right sacral region.   Skin:     General: Skin is warm and dry.   Neurological:      Mental Status: She is alert and oriented to person, place, and time.      Comments: Patient with normal strength demonstrated in upper and lower extremities.  Reports diffuse numbness in extremities.   Psychiatric:         Mood and Affect: Affect normal. Mood is anxious.         ED Course          Procedures         The patient has signs of Severe Sepsis as evidenced by:    1. 2 SIRS criteria, AND  2.  "Suspected infection, AND   3. Organ dysfunction: Lactic Acidosis with value >2.0    Time severe sepsis diagnosis confirmed: 1918 03/17/22 as this was the time when Lactate resulted, and the level was > 2.0    3 Hour Severe Sepsis Bundle Completion:    1. Initial Lactic Acid Result:   Recent Labs   Lab Test 03/17/22  1918 12/15/20  1425 12/15/20  0920   LACT 3.1* 1.4 5.0*     2. Blood Cultures before Antibiotics: Yes  3. Broad Spectrum Antibiotics Administered:  yes       Anti-infectives (From admission through now)    Start     Dose/Rate Route Frequency Ordered Stop    03/17/22 2100  vancomycin (VANCOCIN) 1000 mg in dextrose 5% 200 mL PREMIX         1,000 mg  200 mL/hr over 1 Hours Intravenous EVERY 18 HOURS 03/17/22 2057 03/17/22 1935  piperacillin-tazobactam (ZOSYN) intermittent infusion 4.5 g        Note to Pharmacy: For [facility], [facility] and [facility]: For Zosyn-naive patients, use the \"Zosyn initial dose + extended infusion\" order panel.    4.5 g  200 mL/hr over 30 Minutes Intravenous ONCE 03/17/22 1933 03/17/22 2059          4. Fluid volume administered in ED:  Full 30 mL/kg bolus given (see amount below).    BMI Readings from Last 1 Encounters:   03/17/22 25.61 kg/m      30 mL/kg fluids based on weight: 1,910 mL  30 mL/kg fluids based on IBW (must be >= 60 inches tall): 1,500 mL                    Results for orders placed or performed during the hospital encounter of 03/17/22 (from the past 24 hour(s))   CBC with platelets differential    Narrative    The following orders were created for panel order CBC with platelets differential.  Procedure                               Abnormality         Status                     ---------                               -----------         ------                     CBC with platelets and d...[[id number]]  Abnormal            Final result                 Please view results for these tests on the individual orders.   Comprehensive metabolic panel   Result Value Ref Range    " Sodium 130 (L) 133 - 144 mmol/L    Potassium 2.7 (L) 3.4 - 5.3 mmol/L    Chloride 91 (L) 94 - 109 mmol/L    Carbon Dioxide (CO2) 24 20 - 32 mmol/L    Anion Gap 15 (H) 3 - 14 mmol/L    Urea Nitrogen 28 7 - 30 mg/dL    Creatinine 1.06 (H) 0.52 - 1.04 mg/dL    Calcium 10.3 (H) 8.5 - 10.1 mg/dL    Glucose 119 (H) 70 - 99 mg/dL    Alkaline Phosphatase 79 40 - 150 U/L    AST 53 (H) 0 - 45 U/L    ALT 28 0 - 50 U/L    Protein Total 8.8 6.8 - 8.8 g/dL    Albumin 4.6 3.4 - 5.0 g/dL    Bilirubin Total 1.1 0.2 - 1.3 mg/dL    GFR Estimate 60 (L) >60 mL/min/1.73m2   CRP inflammation   Result Value Ref Range    CRP Inflammation 3.9 0.0 - 8.0 mg/L   CBC with platelets and differential   Result Value Ref Range    WBC Count 14.0 (H) 4.0 - 11.0 10e3/uL    RBC Count 5.00 3.80 - 5.20 10e6/uL    Hemoglobin 13.8 11.7 - 15.7 g/dL    Hematocrit 39.7 35.0 - 47.0 %    MCV 79 78 - 100 fL    MCH 27.6 26.5 - 33.0 pg    MCHC 34.8 31.5 - 36.5 g/dL    RDW 14.0 10.0 - 15.0 %    Platelet Count 282 150 - 450 10e3/uL    % Neutrophils 74 %    % Lymphocytes 8 %    % Monocytes 17 %    % Eosinophils 0 %    % Basophils 0 %    % Immature Granulocytes 1 %    NRBCs per 100 WBC 0 <1 /100    Absolute Neutrophils 10.4 (H) 1.6 - 8.3 10e3/uL    Absolute Lymphocytes 1.1 0.8 - 5.3 10e3/uL    Absolute Monocytes 2.4 (H) 0.0 - 1.3 10e3/uL    Absolute Eosinophils 0.0 0.0 - 0.7 10e3/uL    Absolute Basophils 0.0 0.0 - 0.2 10e3/uL    Absolute Immature Granulocytes 0.1 <=0.4 10e3/uL    Absolute NRBCs 0.0 10e3/uL   Lactic acid whole blood   Result Value Ref Range    Lactic Acid 3.1 (H) 0.7 - 2.0 mmol/L   CT Abdomen Pelvis w Contrast    Narrative    EXAM: CT ABDOMEN PELVIS W CONTRAST  LOCATION: [facility]  DATE/TIME: 3/17/2022 8:25 PM    INDICATION: Abdominal distention. Abdominal pain, acute, nonlocalized.  COMPARISON: None available.  TECHNIQUE: CT scan of the abdomen and pelvis was performed after the injection of 86 mL Isovue 370  intravenously. Multiplanar reformats were obtained. Dose reduction techniques were used.    FINDINGS:   LOWER CHEST: Partially visualized bilateral breast implants. Small hiatal hernia.    ABDOMEN/PELVIS:    HEPATOBILIARY: No suspicious focal hepatic lesion. The gallbladder is surgically absent.    PANCREAS: No main pancreatic ductal dilatation or definite solid pancreatic mass.    SPLEEN: No splenomegaly.    ADRENAL GLANDS: No adrenal nodules.    KIDNEYS/BLADDER: No radiodense kidney/ureteral stones or hydronephrosis in either kidney. Bilateral subcentimeter hypodense foci in both kidneys, too small to characterize. The urinary bladder is distended, otherwise unremarkable.    BOWEL: No abnormally dilated bowel loops. The appendix is visualized, contain few diverticula.    PERITONEUM: No evidence of free fluid in the abdomen and pelvis. No free peritoneal or portal venous gas.    PELVIC ORGANS: Retroverted uterus.    VASCULATURE: Scattered atherosclerotic vascular calcification of the abdominal aorta and iliac vessels.    LYMPH NODES: No significant abdominopelvic lymphadenopathy.    MUSCULOSKELETAL: Diffuse sclerotic osseous metastases with significant compression deformity of L1 with approximately 80% vertebral height loss, better seen and evaluated on same-day lumbar spine CT.      Impression    IMPRESSION:   1. No acute pathology in the abdomen or pelvis.  2. Diffuse sclerotic osseous metastases with significant compression deformity of L1 vertebra, better seen and evaluated on same-day lumbar spine CT.   CT Lumbar spine w/o contrast*    Narrative    EXAM: CT LUMBAR SPINE W/O CONTRAST  LOCATION: [facility]  DATE/TIME: 3/17/2022 8:26 PM    INDICATION: Back pain. Metastatic breast cancer. Tingling in the legs arms and face since yesterday. Leg weakness.  COMPARISON: PET/CT 12/21/2021.  TECHNIQUE: Routine CT Lumbar Spine without IV contrast. Multiplanar reformats. Dose reduction  techniques were used.    FINDINGS:  Nomenclature is based on 5 lumbar type vertebral bodies. Widespread mixed lytic and sclerotic metastatic lesions in the visualized thoracolumbar spine, upper sacrum and bony pelvis similar to prior PET/CT 12/21/2021. Stable appearing chronic severe   pathologic L1 compression fracture. Stable mild chronic compression deformities along the superior bony endplates of L3 and L4 vertebral bodies as well. None of these compression deformities cause significant central canal narrowing. No definite new   compression fracture in the visualized thoracolumbar spine. Lateral lumbar alignment satisfactory.     There are nondisplaced fractures coursing through the L3 pedicles bilaterally best seen on series 4 images 54 through 59. These were not clearly evident previously. These are likely pathologic. No extraspinal soft tissue abnormality.    The central spinal canal in the visualized thoracolumbar region and upper sacrum appears adequate. Mild narrowing along the superior L4 level. No significant right or left-sided foraminal narrowing identified.       Impression    IMPRESSION:  1.  Widespread lytic and sclerotic bony metastatic lesions in the visualized thoracolumbar spine, sacrum and upper bony pelvis similar to prior PET/CT 12/21/2021.  2.  Stable relatively severe chronic L1 pathologic compression fracture. Stable mild compression deformity superior endplate of L3 and superior endplate of L4. The upper margin of the L4 fracture extends slightly into the spinal canal without high-grade   central canal narrowing. This is unchanged.  3.  There are nondisplaced fractures through the L3 pedicles bilaterally which are likely pathologic. These were not clearly evident on the prior PET/CT. No associated alignment abnormality.  4.  Grossly the central spinal canal in the visualized thoracolumbar region and upper sacrum is adequate. No definite high-grade foraminal narrowing on the right or left  side identified.   UA with Microscopic reflex to Culture    Specimen: Urine, Midstream   Result Value Ref Range    Color Urine Colorless Colorless, Straw, Light Yellow, Yellow    Appearance Urine Clear Clear    Glucose Urine Negative Negative mg/dL    Bilirubin Urine Negative Negative    Ketones Urine Negative Negative mg/dL    Specific Gravity Urine 1.017 1.003 - 1.035    Blood Urine Moderate (A) Negative    pH Urine 8.0 (H) 5.0 - 7.0    Protein Albumin Urine Negative Negative mg/dL    Urobilinogen Urine Normal Normal, 2.0 mg/dL    Nitrite Urine Negative Negative    Leukocyte Esterase Urine Negative Negative    Bacteria Urine Few (A) None Seen /HPF    RBC Urine 1 <=2 /HPF    WBC Urine 5 <=5 /HPF    Squamous Epithelials Urine <1 <=1 /HPF    Narrative    Urine Culture not indicated       Medications   0.9% sodium chloride BOLUS (0 mLs Intravenous Stopped 3/17/22 2049)     Followed by   sodium chloride 0.9% infusion (0 mLs Intravenous Hold 3/17/22 2120)   HYDROmorphone (PF) (DILAUDID) injection 0.5 mg (0.5 mg Intravenous Given 3/17/22 2207)   potassium chloride 10 mEq in 100 mL sterile water intermittent infusion (premix) (10 mEq Intravenous New Bag 3/17/22 2206)   vancomycin (VANCOCIN) 1000 mg in dextrose 5% 200 mL PREMIX (0 mg Intravenous Stopped 3/17/22 2223)   HYDROmorphone (DILAUDID) injection 1 mg (1 mg Intravenous Given 3/17/22 1922)   metoclopramide (REGLAN) injection 5 mg (5 mg Intravenous Given 3/17/22 1918)   piperacillin-tazobactam (ZOSYN) intermittent infusion 4.5 g (0 g Intravenous Stopped 3/17/22 2059)   0.9% sodium chloride BOLUS (0 mLs Intravenous Stopped 3/17/22 2211)   iopamidol (ISOVUE-370) solution 100 mL (81 mLs Intravenous Given 3/17/22 2046)   sodium chloride 0.9 % bag 500mL for CT scan flush use (60 mLs Intravenous Given 3/17/22 2046)          Assessments & Plan (with Medical Decision Making)  Shaneka Alvarez is a 61 year old female who presented to the ED complaining of acute back pain,  abdominal pain, nausea and vomiting, and extremity numbness.  No BM x4 days, complaining of diffuse abdominal discomfort.  Nausea and vomiting for the last 2 days but no vomiting today.  After episode of vomiting a couple days ago she developed a severe pain in her back with diffuse paresthesias to her arms and legs and face intermittently.  On arrival to the ED her blood pressure was elevated at 145/120.  She was tachycardic.  Appeared severely uncomfortable.  Exquisite tenderness noted in mid lumbar and right sacral spine region.  Mild generalized abdominal tenderness noted.  She did have normal strength in her arms and legs.  IV was established and labs are drawn for further evaluation.  Given a bolus of fluids as well as Dilaudid for pain and Reglan for nausea, patient reported Zofran does not work for her.  Initial labs came back notable for an elevated white count of 14.0.  With her oncology history on chemotherapy, concern for underlying sepsis so lactic acid and blood cultures were added on.  Lactate did come back elevated at 3.1 so Zosyn and vancomycin ordered for broad-spectrum coverage of presumed sepsis of unknown source. Other labs of note were a sodium of 130, potassium of 2.7.  Replacement ordered.  Her creatinine was mildly elevated at 1.06.  Calcium 10.3.  Urinalysis without signs of infection.  A CT scan of her abdomen and pelvis as well as her lumbar spine were ordered for further evaluation of her pain.  CT scan of the abdomen/pelvis did not show any acute etiology to explain her abdominal pain or leukocytosis.  Lumbar CT showed chronic L1 deformity but also unfortunately nondisplaced bilateral L3 pedicle fractures, likely pathologic.  Suspect these may have fractured when she was severely vomiting and felt a pop.  This does correlate with where her pain is the worst.  I discussed these results with the patient.  She is high risk for underlying infection given her chemotherapy status and sepsis  is certainly a concern.  Other possibility is dehydration from vomiting illness.  Unclear etiology for her diffuse paresthesias she was describing.  I think regardless, she would benefit from hospitalization for potassium replacement, pain control, and trending of her cultures. I discussed her case with Dr. Rea, our hospitalist, who accepted patient onto his service for further management.  I did speak with Tono Reynolds, neurosurgical PA, regarding her L3 pedicle fractures who confirmed no intervention warranted, only pain control at this time.  Staffed in the ED with Dr. Baugh.      I have reviewed the nursing notes.    I have reviewed the findings, diagnosis, plan and need for follow up with the patient.       ED to Inpatient Handoff:    Discussed with Dr. Rea at 2205  Patient accepted for Inpatient Stay  Pending studies include cultures  Code Status: Not Addressed           New Prescriptions    No medications on file       Final diagnoses:   Hyponatremia   Hypokalemia   Vomiting   Acute midline low back pain, unspecified whether sciatica present   Bony metastasis (H)   Sepsis (H)   Acute kidney injury (H)   Closed fracture of pedicle of lumbar vertebra, initial encounter (H)     Note: Chart documentation done in part with Dragon Voice Recognition software. Although reviewed after completion, some word and grammatical errors may remain.     3/17/2022   [facility] EMERGENCY DEPT     Jeannie Baires PA-C  03/17/22 0239

## 2022-03-17 NOTE — TELEPHONE ENCOUNTER
Patient called to report tingling in her legs, arms and face that began yesterday. She describes it as annoying rather than painful and does not think it is associated with her MS. She denies any headaches or facial movement changes.    Patient reports weakness in bilateral legs that is causing problems with her ability to move. She is able to stand for short periods of time. The weakness has gotten progressively worse over the past few days.     Patient's last BM was 4 days ago despite the use of Miralax last night and this mornings. She is not passing gas and vomited the past two days. She complains of bloating and stomach pain. She is having difficulty eating and drinking and keeping her medication down.     Patient does not have a working thermometer at home but thinks she may have had a fever yesterday.      Writer recommended that she go to the ER for further evaluation. Patient hesitates to go.      Writer again encouraged her to do so for a timely evaluation.   Kerry Mix RN on 3/17/2022 at 3:23 PM

## 2022-03-17 NOTE — ED TRIAGE NOTES
Pt presents with concern for weakness, nausea and vomiting and concern for 4 days of constipation. Pt called her oncology clinic after vomiting and having severe pain in her back. 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

## 2022-03-17 NOTE — TELEPHONE ENCOUNTER
Writer called back to check on the patient's decision regarding the ER and she was able to get her daughter to take her. [place] is the closest ER. Writer informed the ER triage that the patient is coming.   Kerry Mix RN on 3/17/2022 at 4:14 PM

## 2022-03-18 ENCOUNTER — APPOINTMENT (OUTPATIENT)
Dept: PHYSICAL THERAPY | Facility: CLINIC | Age: 62
DRG: 542 | End: 2022-03-18
Attending: INTERNAL MEDICINE
Payer: MEDICARE

## 2022-03-18 PROBLEM — R79.89 ELEVATED SERUM CREATININE: Status: ACTIVE | Noted: 2022-03-18

## 2022-03-18 LAB
ANION GAP SERPL CALCULATED.3IONS-SCNC: 6 MMOL/L (ref 3–14)
BUN SERPL-MCNC: 12 MG/DL (ref 7–30)
CALCIUM SERPL-MCNC: 8.1 MG/DL (ref 8.5–10.1)
CHLORIDE BLD-SCNC: 106 MMOL/L (ref 94–109)
CO2 SERPL-SCNC: 26 MMOL/L (ref 20–32)
CREAT SERPL-MCNC: 0.7 MG/DL (ref 0.52–1.04)
ERYTHROCYTE [DISTWIDTH] IN BLOOD BY AUTOMATED COUNT: 14.6 % (ref 10–15)
GFR SERPL CREATININE-BSD FRML MDRD: >90 ML/MIN/1.73M2
GLUCOSE BLD-MCNC: 66 MG/DL (ref 70–99)
HCT VFR BLD AUTO: 31.7 % (ref 35–47)
HGB BLD-MCNC: 10.4 G/DL (ref 11.7–15.7)
HOLD SPECIMEN: NORMAL
LACTATE SERPL-SCNC: 1.1 MMOL/L (ref 0.7–2)
MCH RBC QN AUTO: 28 PG (ref 26.5–33)
MCHC RBC AUTO-ENTMCNC: 32.8 G/DL (ref 31.5–36.5)
MCV RBC AUTO: 85 FL (ref 78–100)
PLATELET # BLD AUTO: 172 10E3/UL (ref 150–450)
POTASSIUM BLD-SCNC: 3.3 MMOL/L (ref 3.4–5.3)
POTASSIUM BLD-SCNC: 3.5 MMOL/L (ref 3.4–5.3)
PROCALCITONIN SERPL-MCNC: <0.05 NG/ML
RBC # BLD AUTO: 3.72 10E6/UL (ref 3.8–5.2)
SODIUM SERPL-SCNC: 138 MMOL/L (ref 133–144)
WBC # BLD AUTO: 8.7 10E3/UL (ref 4–11)

## 2022-03-18 PROCEDURE — 250N000013 HC RX MED GY IP 250 OP 250 PS 637: Performed by: NURSE PRACTITIONER

## 2022-03-18 PROCEDURE — 120N000001 HC R&B MED SURG/OB

## 2022-03-18 PROCEDURE — 250N000013 HC RX MED GY IP 250 OP 250 PS 637: Performed by: INTERNAL MEDICINE

## 2022-03-18 PROCEDURE — 97161 PT EVAL LOW COMPLEX 20 MIN: CPT | Mod: GP | Performed by: PHYSICAL THERAPIST

## 2022-03-18 PROCEDURE — 99220 PR INITIAL OBSERVATION CARE,LEVEL III: CPT | Mod: 95 | Performed by: INTERNAL MEDICINE

## 2022-03-18 PROCEDURE — 83605 ASSAY OF LACTIC ACID: CPT | Performed by: FAMILY MEDICINE

## 2022-03-18 PROCEDURE — 84145 PROCALCITONIN (PCT): CPT | Performed by: NURSE PRACTITIONER

## 2022-03-18 PROCEDURE — 250N000011 HC RX IP 250 OP 636: Performed by: INTERNAL MEDICINE

## 2022-03-18 PROCEDURE — 36415 COLL VENOUS BLD VENIPUNCTURE: CPT | Performed by: NURSE PRACTITIONER

## 2022-03-18 PROCEDURE — 85014 HEMATOCRIT: CPT | Performed by: NURSE PRACTITIONER

## 2022-03-18 PROCEDURE — 84132 ASSAY OF SERUM POTASSIUM: CPT | Performed by: INTERNAL MEDICINE

## 2022-03-18 PROCEDURE — 82310 ASSAY OF CALCIUM: CPT | Performed by: NURSE PRACTITIONER

## 2022-03-18 PROCEDURE — 250N000011 HC RX IP 250 OP 636: Performed by: PHYSICIAN ASSISTANT

## 2022-03-18 PROCEDURE — 36415 COLL VENOUS BLD VENIPUNCTURE: CPT | Performed by: FAMILY MEDICINE

## 2022-03-18 PROCEDURE — 258N000003 HC RX IP 258 OP 636: Performed by: PHYSICIAN ASSISTANT

## 2022-03-18 PROCEDURE — 999N000111 HC STATISTIC OT IP EVAL DEFER: Performed by: OCCUPATIONAL THERAPIST

## 2022-03-18 PROCEDURE — 99207 PR NOT IN PERSON INPATIENT CONSULT STATISTICAL MARKER: CPT | Performed by: INTERNAL MEDICINE

## 2022-03-18 RX ORDER — ACETAMINOPHEN 325 MG/1
650 TABLET ORAL EVERY 4 HOURS PRN
Status: DISCONTINUED | OUTPATIENT
Start: 2022-03-18 | End: 2022-03-19 | Stop reason: HOSPADM

## 2022-03-18 RX ORDER — BUSPIRONE HYDROCHLORIDE 5 MG/1
15 TABLET ORAL 2 TIMES DAILY
Status: DISCONTINUED | OUTPATIENT
Start: 2022-03-18 | End: 2022-03-19 | Stop reason: HOSPADM

## 2022-03-18 RX ORDER — LORATADINE 10 MG/1
10 TABLET ORAL DAILY PRN
Status: DISCONTINUED | OUTPATIENT
Start: 2022-03-18 | End: 2022-03-19 | Stop reason: HOSPADM

## 2022-03-18 RX ORDER — NALOXONE HYDROCHLORIDE 0.4 MG/ML
0.2 INJECTION, SOLUTION INTRAMUSCULAR; INTRAVENOUS; SUBCUTANEOUS
Status: DISCONTINUED | OUTPATIENT
Start: 2022-03-18 | End: 2022-03-19 | Stop reason: HOSPADM

## 2022-03-18 RX ORDER — METOCLOPRAMIDE HYDROCHLORIDE 5 MG/ML
10 INJECTION INTRAMUSCULAR; INTRAVENOUS EVERY 6 HOURS PRN
Status: DISCONTINUED | OUTPATIENT
Start: 2022-03-18 | End: 2022-03-19 | Stop reason: HOSPADM

## 2022-03-18 RX ORDER — ONDANSETRON 2 MG/ML
4 INJECTION INTRAMUSCULAR; INTRAVENOUS EVERY 6 HOURS PRN
Status: DISCONTINUED | OUTPATIENT
Start: 2022-03-18 | End: 2022-03-19 | Stop reason: HOSPADM

## 2022-03-18 RX ORDER — HYDROMORPHONE HYDROCHLORIDE 1 MG/ML
0.5 INJECTION, SOLUTION INTRAMUSCULAR; INTRAVENOUS; SUBCUTANEOUS
Status: DISCONTINUED | OUTPATIENT
Start: 2022-03-18 | End: 2022-03-18

## 2022-03-18 RX ORDER — TRAZODONE HYDROCHLORIDE 50 MG/1
50 TABLET, FILM COATED ORAL AT BEDTIME
Status: DISCONTINUED | OUTPATIENT
Start: 2022-03-18 | End: 2022-03-19 | Stop reason: HOSPADM

## 2022-03-18 RX ORDER — ACETAMINOPHEN 650 MG/1
650 SUPPOSITORY RECTAL EVERY 6 HOURS PRN
Status: DISCONTINUED | OUTPATIENT
Start: 2022-03-18 | End: 2022-03-19 | Stop reason: HOSPADM

## 2022-03-18 RX ORDER — MORPHINE SULFATE 15 MG/1
15-30 TABLET ORAL
Status: DISCONTINUED | OUTPATIENT
Start: 2022-03-18 | End: 2022-03-18

## 2022-03-18 RX ORDER — POTASSIUM CHLORIDE 1500 MG/1
20 TABLET, EXTENDED RELEASE ORAL ONCE
Status: COMPLETED | OUTPATIENT
Start: 2022-03-18 | End: 2022-03-18

## 2022-03-18 RX ORDER — ONDANSETRON 4 MG/1
4 TABLET, ORALLY DISINTEGRATING ORAL EVERY 6 HOURS PRN
Status: DISCONTINUED | OUTPATIENT
Start: 2022-03-18 | End: 2022-03-19 | Stop reason: HOSPADM

## 2022-03-18 RX ORDER — NALOXONE HYDROCHLORIDE 0.4 MG/ML
0.4 INJECTION, SOLUTION INTRAMUSCULAR; INTRAVENOUS; SUBCUTANEOUS
Status: DISCONTINUED | OUTPATIENT
Start: 2022-03-18 | End: 2022-03-19 | Stop reason: HOSPADM

## 2022-03-18 RX ORDER — MORPHINE SULFATE 15 MG/1
30-45 TABLET ORAL EVERY 4 HOURS PRN
Status: DISCONTINUED | OUTPATIENT
Start: 2022-03-18 | End: 2022-03-19 | Stop reason: HOSPADM

## 2022-03-18 RX ORDER — BICALUTAMIDE 50 MG/1
150 TABLET, FILM COATED ORAL DAILY
Status: DISCONTINUED | OUTPATIENT
Start: 2022-03-18 | End: 2022-03-19 | Stop reason: HOSPADM

## 2022-03-18 RX ORDER — PROPRANOLOL HYDROCHLORIDE 80 MG/1
80 CAPSULE, EXTENDED RELEASE ORAL DAILY
Status: DISCONTINUED | OUTPATIENT
Start: 2022-03-18 | End: 2022-03-19 | Stop reason: HOSPADM

## 2022-03-18 RX ORDER — LORAZEPAM 1 MG/1
1 TABLET ORAL EVERY 4 HOURS PRN
Status: DISCONTINUED | OUTPATIENT
Start: 2022-03-18 | End: 2022-03-19 | Stop reason: HOSPADM

## 2022-03-18 RX ORDER — POTASSIUM CHLORIDE 750 MG/1
10 TABLET, EXTENDED RELEASE ORAL DAILY
Status: DISCONTINUED | OUTPATIENT
Start: 2022-03-18 | End: 2022-03-19 | Stop reason: HOSPADM

## 2022-03-18 RX ORDER — ALBUTEROL SULFATE 90 UG/1
2 AEROSOL, METERED RESPIRATORY (INHALATION) EVERY 4 HOURS PRN
Status: DISCONTINUED | OUTPATIENT
Start: 2022-03-18 | End: 2022-03-19 | Stop reason: HOSPADM

## 2022-03-18 RX ORDER — LIDOCAINE 4 G/G
1 PATCH TOPICAL
Status: DISCONTINUED | OUTPATIENT
Start: 2022-03-18 | End: 2022-03-19 | Stop reason: HOSPADM

## 2022-03-18 RX ADMIN — HYDROMORPHONE HYDROCHLORIDE 0.5 MG: 1 INJECTION, SOLUTION INTRAMUSCULAR; INTRAVENOUS; SUBCUTANEOUS at 01:08

## 2022-03-18 RX ADMIN — HYDROMORPHONE HYDROCHLORIDE 0.5 MG: 1 INJECTION, SOLUTION INTRAMUSCULAR; INTRAVENOUS; SUBCUTANEOUS at 04:08

## 2022-03-18 RX ADMIN — MORPHINE SULFATE 30 MG: 15 TABLET ORAL at 06:29

## 2022-03-18 RX ADMIN — SODIUM CHLORIDE: 9 INJECTION, SOLUTION INTRAVENOUS at 07:26

## 2022-03-18 RX ADMIN — TRAZODONE HYDROCHLORIDE 50 MG: 50 TABLET ORAL at 21:33

## 2022-03-18 RX ADMIN — ACETAMINOPHEN 650 MG: 325 TABLET, FILM COATED ORAL at 01:07

## 2022-03-18 RX ADMIN — POTASSIUM CHLORIDE 10 MEQ: 7.46 INJECTION, SOLUTION INTRAVENOUS at 00:54

## 2022-03-18 RX ADMIN — APIXABAN 5 MG: 5 TABLET, FILM COATED ORAL at 08:34

## 2022-03-18 RX ADMIN — MORPHINE SULFATE 30 MG: 15 TABLET ORAL at 16:44

## 2022-03-18 RX ADMIN — POTASSIUM CHLORIDE 10 MEQ: 7.46 INJECTION, SOLUTION INTRAVENOUS at 00:01

## 2022-03-18 RX ADMIN — BUSPIRONE HYDROCHLORIDE 15 MG: 5 TABLET ORAL at 21:32

## 2022-03-18 RX ADMIN — MORPHINE SULFATE 30 MG: 15 TABLET ORAL at 09:37

## 2022-03-18 RX ADMIN — LIDOCAINE PATCH 4% 1 PATCH: 40 PATCH TOPICAL at 21:31

## 2022-03-18 RX ADMIN — HYDROMORPHONE HYDROCHLORIDE 0.5 MG: 1 INJECTION, SOLUTION INTRAMUSCULAR; INTRAVENOUS; SUBCUTANEOUS at 13:09

## 2022-03-18 RX ADMIN — VENLAFAXINE HYDROCHLORIDE 225 MG: 150 CAPSULE, EXTENDED RELEASE ORAL at 08:34

## 2022-03-18 RX ADMIN — POTASSIUM CHLORIDE 20 MEQ: 1500 TABLET, EXTENDED RELEASE ORAL at 14:44

## 2022-03-18 RX ADMIN — POTASSIUM CHLORIDE 10 MEQ: 750 TABLET, EXTENDED RELEASE ORAL at 08:34

## 2022-03-18 RX ADMIN — APIXABAN 5 MG: 5 TABLET, FILM COATED ORAL at 21:33

## 2022-03-18 RX ADMIN — ONDANSETRON 4 MG: 4 TABLET, ORALLY DISINTEGRATING ORAL at 16:44

## 2022-03-18 RX ADMIN — BUSPIRONE HYDROCHLORIDE 15 MG: 5 TABLET ORAL at 08:34

## 2022-03-18 RX ADMIN — BICALUTAMIDE 150 MG: 50 TABLET, FILM COATED ORAL at 21:39

## 2022-03-18 RX ADMIN — Medication 1 TABLET: at 08:40

## 2022-03-18 RX ADMIN — MORPHINE SULFATE 30 MG: 15 TABLET ORAL at 21:33

## 2022-03-18 ASSESSMENT — ACTIVITIES OF DAILY LIVING (ADL)
ADLS_ACUITY_SCORE: 6
DIFFICULTY_COMMUNICATING: NO
ADLS_ACUITY_SCORE: 6
CONCENTRATING,_REMEMBERING_OR_MAKING_DECISIONS_DIFFICULTY: NO
TOILETING_ISSUES: NO
ADLS_ACUITY_SCORE: 6
DOING_ERRANDS_INDEPENDENTLY_DIFFICULTY: YES
ADLS_ACUITY_SCORE: 6
WEAR_GLASSES_OR_BLIND: YES
ADLS_ACUITY_SCORE: 6
WALKING_OR_CLIMBING_STAIRS_DIFFICULTY: YES
TRANSFERRING: 0-->INDEPENDENT
ADLS_ACUITY_SCORE: 6
DRESSING/BATHING_DIFFICULTY: NO
HEARING_DIFFICULTY_OR_DEAF: NO
CHANGE_IN_FUNCTIONAL_STATUS_SINCE_ONSET_OF_CURRENT_ILLNESS/INJURY: NO
ADLS_ACUITY_SCORE: 6
WALKING_OR_CLIMBING_STAIRS: STAIR CLIMBING DIFFICULTY, DEPENDENT
ADLS_ACUITY_SCORE: 6
ADLS_ACUITY_SCORE: 6
FALL_HISTORY_WITHIN_LAST_SIX_MONTHS: NO
TRANSFERRING: 0-->INDEPENDENT
VISION_MANAGEMENT: GLASSES
ADLS_ACUITY_SCORE: 6
ADLS_ACUITY_SCORE: 8
DIFFICULTY_EATING/SWALLOWING: NO
ADLS_ACUITY_SCORE: 6

## 2022-03-18 NOTE — ED NOTES
ED Nursing criteria listed below was addressed during verbal handoff:     Abnormal vitals: No  Abnormal results: Yes, WBC increased, L3 metastatic fractures.  Med Reconciliation completed: Yes  Meds given in ED: Yes Potassium and fluids still running.  Any Overdue Meds: No  Core Measures: Yes, 2 BC drawn.  Isolation: N/A  Special needs: Yes, standby assist to bathroom.  Skin assessment: Yes no wounds noted.    Observation Patient  Education provided: N/A

## 2022-03-18 NOTE — PROGRESS NOTES
[facility]    Medicine Progress Note - Hospitalist Service    Date of Admission:  3/17/2022    Assessment & Plan          Shaneka Alvarez is a 61 year old female with metastatic breast presents with back pain. She is currently taking an oral chemotherapy. She had vomiting and severe pain in her back yesterday (in afternoon). She denied falls. During one of her emesis episodes, she heard a pop in her back. The pain is located in the lumbar region. Pain is moderate to severe. Worse with movement, better with immobilization. Patient has buprenorphine patch. Patient also takes oral Morphine at home. Pain was not controlled at home. In the ED, patient was noted to have elevated creatinine of 1.06. Sodium was low at 130. Potassium low at 2.7. She was noted to have elevated lactic of 3.1 which improved with IV fluids. WBC mildly elevated at 14.0. There was some initial concern for sepsis so she was started on IV Vancomcyin and Zosyn for sepsis of unknown etiology. Imaging of the lower back showed stable chronic L1 pathologic compression fracture and possible acute pathologic fractures noted in the L3 pedicles bilaterally. ED provider spoke with neurosurgery who recommended conservative treatment and pain control. Admitted for pain control and further workup of sepsis.     Principal Problem:      Closed fracture of pedicle of lumbar vertebra, initial encounter (H)    Acute midline low back pain, unspecified whether sciatica present    Bony metastasis (H)    Breast cancer (H)  Assessment: Patient with chronic pain in lower back typically tolerable with buprenorphine patch and immediate release morphine tabs. She notes she typically takes this only once per day. Patient presented with acute lower back pain which began after she was vomiting several days prior to admission. Notes 30 mg morphine not managing pain effectively at home. Has been receiving 0.5 mg IV dilaudid with adequate pain  control while in hospital. No sedation or respiratory depression noted. Notes pain does not radiate down legs. No loss of bladder or bowel function. Patient wishes to return home but at this point is willing to stay tonight to ensure she does not have bacterial infection.   Plan:   - Continue buprenorphine patch  - Stop IV dilaudid  - Increase immediate release morphine to 30-45 mg every four hours as needed for pain.   - Add Lidoderm patch   - Will need outpatient follow up with oncology  - Continue to monitor     Active Problems:      Sepsis without acute organ dysfunction (H)-possible   Assessment: Some concern for sepsis in the ED given patient with elevated Lactic acid and WBC of 14. Patient is afebrile. No specific source found given normal UA and negative chest CT. Suspect elevated lactic acid secondary to dehydration given recent vomiting. This has improved and is normal following IV fluids. Suspect leukocytosis elevated secondary to trauma/pain. This has also improved and WBC is normal today. Patient is afebrile.   Plan:   - Will stop IV antibiotics for now. Would have low threshold to restart antibiotics if procalcitonin elevated or patient spikes temperature.   - Check procalcitonin  - Continue to monitor       Hypokalemia    Hyponatremia  Assessment: Low on admission likely secondary to dehydration and GI losses due to vomiting. Patient notes no further episodes of nausea or vomiting. Sodium normal at 138. Potassium remains low at 3.3.   Plan:   - Continue to monitor   - Replace potassium per protocol       Vomiting  Assessment: Patient with recent nausea and vomiting. Received chemotherapy 3/9 and patient believes nausea is secondary to this. She denies any nausea or vomiting today.   Plan:   - Reglan available as needed as patient states Zofran not typically effective for her  - Continue to monitor       Elevated serum creatinine  Assessment: Creatinine elevated to 1.06 at time of admission. Baseline  appears to be 0.7-0.8. Likely secondary to dehydration. Received IV fluids overnight and improved today at 0.70. Patient tolerating oral intake. Denies nausea or vomiting.   Plan:   - Continue IV fluids but will decrease rate to 50 mL/hr  - Continue to monitor       Spenser Ashraf NP  Hospitalist Service  [facility]  Securely message with the Vocera Web Console (learn more here)  Text page via [facility] Paging/Directory         Clinically Significant Risk Factors Present on Admission

## 2022-03-18 NOTE — H&P
History and Physical     FACILITY: [facility]      Name: Shaneka Alvarez   Age: 61 year old    : 1960                                                                       MRN: [id number]                                                                                          PCP: [place], [facility]                                                                                              Chief Complaint   Patient presents with     Back Pain     Extremity Weakness          HPI:   Shaneka Alvarez is a 61 year old female with metastatic breast presents with back pain. She is currently taking an oral chemotherapy. She had vomiting and severe pain in her back yesterday (in afternoon). She denied falls. During one of her emesis episodes, she heard a pop in her back. The pain is located in the lumbar region. Pain is moderate to severe. Worse with movement, better with immobilization. Patient has Morphine patch. Patient also takes oral Morphine at home. Pain was not controlled at home.     Past Medical: Metastatic breast CA, Depression, Anxiety, GERD, Raynaud s syndrome    Social History:    Lives with , Ambulatory, former smoker (quit ), no Alcohol, no Drugs       Past Surgical: Bilateral mastectomy, cholecystectomy, umbilical hernia repair         Family History: Mother with breast CA    Patient Active Problem List    Diagnosis Date Noted     Hyponatremia 2022     Priority: Medium     Vomiting 2022     Priority: Medium     Bony metastasis (H) 2022     Priority: Medium     Acute kidney injury (H) 2022     Priority: Medium     Sepsis (H) 2022     Priority: Medium     Closed fracture of pedicle of lumbar vertebra, initial encounter (H) 2022     Priority: Medium     Acute midline low back pain, unspecified whether sciatica present 2022     Priority: Medium     Anemia, unspecified type 10/18/2021     Priority: Medium      Hyperphosphatemia 07/28/2021     Priority: Medium     Drug-induced polyneuropathy (H) 02/18/2021     Priority: Medium     Cancer related pain 02/03/2021     Priority: Medium     Posterior subcapsular polar age-related cataract of both eyes 01/29/2021     Priority: Medium     Added automatically from request for surgery [id number]       Iris synechiae, bilateral 01/29/2021     Priority: Medium     Added automatically from request for surgery [id number]       Dehydration 12/15/2020     Priority: Medium     Chemotherapy induced nausea and vomiting 12/15/2020     Priority: Medium     Sinus tachycardia 12/15/2020     Priority: Medium     Drug-induced nausea and vomiting 12/15/2020     Priority: Medium     Chemotherapy-induced neutropenia (H) 11/10/2020     Priority: Medium     Pulmonary embolism without acute cor pulmonale, unspecified chronicity, unspecified pulmonary embolism type (H) 08/17/2020     Priority: Medium     Hypokalemia 07/28/2020     Priority: Medium     Bone metastases (H) 10/22/2019     Priority: Medium     Metastatic breast cancer (H) 09/18/2019     Priority: Medium     Metastatic disease (H) 08/31/2019     Priority: Medium     S/P breast reconstruction, bilateral 07/31/2018     Priority: Medium     Anxiety and depression 10/25/2017     Priority: Medium     Hx of breast cancer 10/25/2017     Priority: Medium     Major depressive disorder, recurrent episode, moderate (H) 04/07/2016     Priority: Medium     Anxiety 03/12/2016     Priority: Medium     Migraine without aura and without status migrainosus, not intractable 10/23/2015     Priority: Medium     Obesity, Class II, BMI 35-39.9 10/23/2015     Priority: Medium     Multiple sclerosis (H) 08/11/2015     Priority: Medium     CARDIOVASCULAR SCREENING; LDL GOAL LESS THAN 160 08/11/2015     Priority: Medium     Raynaud's syndrome 08/11/2015     Priority: Medium     Breast cancer (H) 08/11/2015     Priority: Medium     Age 44       Complication of anesthesia  08/11/2015     Priority: Medium     Arthritis 08/11/2015     Priority: Medium     Autoimmune disorder (H) 08/11/2015     Priority: Medium     Other insomnia 08/11/2015     Priority: Medium     Medication management contract agreement 08/11/2015     Priority: Medium     Ambien 12.5 mg, dispense 30 per month, follow up every 6 months        Neurogenic bladder 12/22/2014     Priority: Medium     Vision changes 12/22/2014     Priority: Medium     Demyelinating disorder (H) 12/22/2014     Priority: Medium     Weakness 11/20/2014     Priority: Medium     GERD (gastroesophageal reflux disease) 10/22/2014     Priority: Medium     History of breast cancer 10/22/2014     Priority: Medium     Overview:   stage 3, diagnosed in 2006 s/p double mastectomy, chemo and radiation.        Migraine 10/22/2014     Priority: Medium      Past Medical History:   Diagnosis Date     Breast cancer (H)     Age 44     Cataract     left eye     Chemotherapy induced nausea and vomiting 12/15/2020     Common migraine      Esophageal reflux      H/O bilateral mastectomy      Mild major depression (H)      Multiple sclerosis (H)      Pulmonary embolism (H)      Home Medications:  No current facility-administered medications on file prior to encounter.  albuterol (PROAIR HFA/PROVENTIL HFA/VENTOLIN HFA) 108 (90 Base) MCG/ACT inhaler, Inhale 2 puffs into the lungs  bicalutamide (CASODEX) 50 MG tablet, Take 3 tablets (150 mg) by mouth daily  busPIRone (BUSPAR) 15 MG tablet, TAKE 1 TABLET(15 MG) BY MOUTH TWICE DAILY  calcium citrate-vitamin D (CITRACAL) 315-250 MG-UNIT TABS, Take 1 tablet by mouth every morning   Cholecalciferol (VITAMIN D3 PO), Take 2,000 Units by mouth every morning   ELIQUIS ANTICOAGULANT 5 MG tablet, TAKE 1 TABLET(5 MG) BY MOUTH TWICE DAILY  ibuprofen (ADVIL/MOTRIN) 600 MG tablet, Take 1 tablet (600 mg) by mouth every 6 hours as needed for moderate pain  loratadine (CLARITIN) 10 MG tablet, Take 10 mg by mouth daily  LORazepam  (ATIVAN) 1 MG tablet, Take 1 tablet (1 mg) by mouth every 4 hours as needed for anxiety or nausea  magnesium 250 MG tablet, Take 1 tablet by mouth every morning   morphine (MSIR) 15 MG IR tablet, Take 1-2 tablets (15-30 mg) by mouth every 3 hours as needed for pain  potassium chloride ER (KLOR-CON M) 10 MEQ CR tablet, TAKE 1 TABLET(10 MEQ) BY MOUTH DAILY  promethazine (PHENERGAN) 25 MG tablet, Take 25 mg by mouth every 6 hours as needed  propranolol ER (INDERAL LA) 80 MG 24 hr capsule, TAKE 1 CAPSULE(80 MG) BY MOUTH EVERY MORNING  senna-docusate (SENOKOT-S/PERICOLACE) 8.6-50 MG tablet, Take 1-2 tablets by mouth 2 times daily as needed for constipation . Goal is to have a bowel movement every 1-2 days. (Patient taking differently: Take 1-2 tablets by mouth as needed for constipation . Goal is to have a bowel movement every 1-2 days.)  traZODone (DESYREL) 50 MG tablet, TAKE 1 TABLET(50 MG) BY MOUTH AT BEDTIME  venlafaxine (EFFEXOR-XR) 75 MG 24 hr capsule, Take 3 capsules (225 mg) by mouth daily  acetaminophen (TYLENOL) 500 MG tablet, Take 2 tablets (1,000 mg) by mouth 3 times daily (Patient taking differently: Take 1,000 mg by mouth every 8 hours as needed )  buprenorphine (BUTRANS) 5 MCG/HR WK patch, Place 1 patch onto the skin every 7 days  HEMP OIL OR EXTRACT OR OTHER CBD CANNABINOID, NOT MEDICAL CANNABIS,, CBD cream  Nerve Stimulator (NERIVIO) CRISTOPHER, 1 each daily as needed  [DISCONTINUED] capecitabine (XELODA) 500 MG tablet CHEMO, Take 4 tabs ( 2000mg ) in the morning and 3 tabs (1500mg ) in the evening. Days 1 through 14, then off for 7 days. (Patient not taking: Reported on 10/7/2019)      Allergies   Allergen Reactions     Bactrim [Sulfamethoxazole W/Trimethoprim]      Internal bleeding     Copaxone [Glatiramer] Hives     Past Surgical History:   Procedure Laterality Date     CHOLECYSTECTOMY       ENDOBRONCHIAL ULTRASOUND FLEXIBLE N/A 09/05/2019    Procedure: Flexible Bronchoscopy, Endobronchial Ultrasound,  Radial Probe;  Surgeon: Sree Sanchez MD;  Location: [facility] OR     HC REMOVAL OF OVARY/TUBE(S)       HERNIA REPAIR, UMBILICAL       mastectomy  Bilateral 2006     MASTECTOMY, BILATERAL       PHACOEMULSIFICATION CLEAR CORNEA WITH TORIC INTRAOCULAR LENS IMPLANT Left 2021    Procedure: PHACOEMULSIFICATION, COMPLEX CATARACT, WITH INTRAOCULAR LENS IMPLANT, RESIDENT TORIC LENS LEFT;  Surgeon: Luis Barkley MD;  Location: [facility] OR     PHACOEMULSIFICATION CLEAR CORNEA WITH TORIC INTRAOCULAR LENS IMPLANT Right 3/8/2021    Procedure: PHACOEMULSIFICATION, CATARACT, WITH INTRAOCULAR LENS IMPLANT, TORIC LENS RIGHT;  Surgeon: Luis Barkley MD;  Location: [facility] OR     Family History   Problem Relation Age of Onset     Breast Cancer Maternal Aunt 50         at 85     Breast Cancer Cousin 40     Breast Cancer Mother 49        2nd primary, contralateral breast at 69;  at 86     Melanoma Mother      Breast Cancer Maternal Grandmother 40     Ovarian Cancer Maternal Grandmother      Breast Cancer Paternal Grandmother 50         at 60     Brain Cancer Cousin 20     Breast Cancer Paternal Aunt 50         at 60     Breast Cancer Cousin 45        paternal cousin     Anesthesia Reaction No family hx of      Deep Vein Thrombosis No family hx of      Social History     Socioeconomic History     Marital status:      Spouse name: Kash     Number of children: 3     Years of education: Not on file     Highest education level: Not on file   Occupational History     Employer: [employer]   Tobacco Use     Smoking status: Former Smoker     Types: Cigarettes     Quit date: 2005     Years since quittin.3     Smokeless tobacco: Never Used   Vaping Use     Vaping Use: Never used   Substance and Sexual Activity     Alcohol use: Not Currently     Alcohol/week: 2.0 standard drinks     Types: 1 Glasses of wine, 1 Cans of beer per week     Drug use: No     Sexual activity: Yes     Partners: Male      Birth control/protection: None     Comment: not needed after chemo   Other Topics Concern     Parent/sibling w/ CABG, MI or angioplasty before 65F 55M? Not Asked   Social History Narrative     Not on file     Social Determinants of Health     Financial Resource Strain: Not on file   Food Insecurity: Not on file   Transportation Needs: Not on file   Physical Activity: Not on file   Stress: Not on file   Social Connections: Not on file   Intimate Partner Violence: Not on file   Housing Stability: Not on file       Review of Systems  All ten systems were reviewed and negative except as detailed under HPI.      Vitals:     B/P: 107/74, T: 98.4, P: 73, R: 18       Physical Exam:  General appearance: AAOx3, NAD  HEENT: Normocephalic, atraumatic, PERRL, EOMI  Neck: Supple, no JVD, non-tender  Cardiac: RRR, normal S1 and S2, no peripheral edema  Respiratory/Chest: CTAB. No rhonchi, rales or wheezing. Equal breath sounds bilaterally  GI/Abdomen: +BS in all 4 quadrants, soft, non-tender, non-distended, no guarding, no rebound  Back/Spine: Normal alignment, tenderness lumbar region (moderate)  Musculoskeletal/Extremities: ROM intact, non-tender  Neuro: CN 2-12 intact, strength and sensation intact and equal  Psych: Good judgement  Skin: No breakdown, no ulcers. Warm, dry.     Lab Review:  Results for orders placed or performed during the hospital encounter of 03/17/22   CT Abdomen Pelvis w Contrast     Status: None    Narrative    EXAM: CT ABDOMEN PELVIS W CONTRAST  LOCATION: [facility]  DATE/TIME: 3/17/2022 8:25 PM    INDICATION: Abdominal distention. Abdominal pain, acute, nonlocalized.  COMPARISON: None available.  TECHNIQUE: CT scan of the abdomen and pelvis was performed after the injection of 86 mL Isovue 370 intravenously. Multiplanar reformats were obtained. Dose reduction techniques were used.    FINDINGS:   LOWER CHEST: Partially visualized bilateral breast implants. Small hiatal  hernia.    ABDOMEN/PELVIS:    HEPATOBILIARY: No suspicious focal hepatic lesion. The gallbladder is surgically absent.    PANCREAS: No main pancreatic ductal dilatation or definite solid pancreatic mass.    SPLEEN: No splenomegaly.    ADRENAL GLANDS: No adrenal nodules.    KIDNEYS/BLADDER: No radiodense kidney/ureteral stones or hydronephrosis in either kidney. Bilateral subcentimeter hypodense foci in both kidneys, too small to characterize. The urinary bladder is distended, otherwise unremarkable.    BOWEL: No abnormally dilated bowel loops. The appendix is visualized, contain few diverticula.    PERITONEUM: No evidence of free fluid in the abdomen and pelvis. No free peritoneal or portal venous gas.    PELVIC ORGANS: Retroverted uterus.    VASCULATURE: Scattered atherosclerotic vascular calcification of the abdominal aorta and iliac vessels.    LYMPH NODES: No significant abdominopelvic lymphadenopathy.    MUSCULOSKELETAL: Diffuse sclerotic osseous metastases with significant compression deformity of L1 with approximately 80% vertebral height loss, better seen and evaluated on same-day lumbar spine CT.      Impression    IMPRESSION:   1. No acute pathology in the abdomen or pelvis.  2. Diffuse sclerotic osseous metastases with significant compression deformity of L1 vertebra, better seen and evaluated on same-day lumbar spine CT.   CT Lumbar spine w/o contrast*     Status: None    Narrative    EXAM: CT LUMBAR SPINE W/O CONTRAST  LOCATION: [facility]  DATE/TIME: 3/17/2022 8:26 PM    INDICATION: Back pain. Metastatic breast cancer. Tingling in the legs arms and face since yesterday. Leg weakness.  COMPARISON: PET/CT 12/21/2021.  TECHNIQUE: Routine CT Lumbar Spine without IV contrast. Multiplanar reformats. Dose reduction techniques were used.    FINDINGS:  Nomenclature is based on 5 lumbar type vertebral bodies. Widespread mixed lytic and sclerotic metastatic lesions in the  visualized thoracolumbar spine, upper sacrum and bony pelvis similar to prior PET/CT 12/21/2021. Stable appearing chronic severe   pathologic L1 compression fracture. Stable mild chronic compression deformities along the superior bony endplates of L3 and L4 vertebral bodies as well. None of these compression deformities cause significant central canal narrowing. No definite new   compression fracture in the visualized thoracolumbar spine. Lateral lumbar alignment satisfactory.     There are nondisplaced fractures coursing through the L3 pedicles bilaterally best seen on series 4 images 54 through 59. These were not clearly evident previously. These are likely pathologic. No extraspinal soft tissue abnormality.    The central spinal canal in the visualized thoracolumbar region and upper sacrum appears adequate. Mild narrowing along the superior L4 level. No significant right or left-sided foraminal narrowing identified.       Impression    IMPRESSION:  1.  Widespread lytic and sclerotic bony metastatic lesions in the visualized thoracolumbar spine, sacrum and upper bony pelvis similar to prior PET/CT 12/21/2021.  2.  Stable relatively severe chronic L1 pathologic compression fracture. Stable mild compression deformity superior endplate of L3 and superior endplate of L4. The upper margin of the L4 fracture extends slightly into the spinal canal without high-grade   central canal narrowing. This is unchanged.  3.  There are nondisplaced fractures through the L3 pedicles bilaterally which are likely pathologic. These were not clearly evident on the prior PET/CT. No associated alignment abnormality.  4.  Grossly the central spinal canal in the visualized thoracolumbar region and upper sacrum is adequate. No definite high-grade foraminal narrowing on the right or left side identified.   Comprehensive metabolic panel     Status: Abnormal   Result Value Ref Range    Sodium 130 (L) 133 - 144 mmol/L    Potassium 2.7 (L) 3.4 -  5.3 mmol/L    Chloride 91 (L) 94 - 109 mmol/L    Carbon Dioxide (CO2) 24 20 - 32 mmol/L    Anion Gap 15 (H) 3 - 14 mmol/L    Urea Nitrogen 28 7 - 30 mg/dL    Creatinine 1.06 (H) 0.52 - 1.04 mg/dL    Calcium 10.3 (H) 8.5 - 10.1 mg/dL    Glucose 119 (H) 70 - 99 mg/dL    Alkaline Phosphatase 79 40 - 150 U/L    AST 53 (H) 0 - 45 U/L    ALT 28 0 - 50 U/L    Protein Total 8.8 6.8 - 8.8 g/dL    Albumin 4.6 3.4 - 5.0 g/dL    Bilirubin Total 1.1 0.2 - 1.3 mg/dL    GFR Estimate 60 (L) >60 mL/min/1.73m2   CRP inflammation     Status: Normal   Result Value Ref Range    CRP Inflammation 3.9 0.0 - 8.0 mg/L   CBC with platelets and differential     Status: Abnormal   Result Value Ref Range    WBC Count 14.0 (H) 4.0 - 11.0 10e3/uL    RBC Count 5.00 3.80 - 5.20 10e6/uL    Hemoglobin 13.8 11.7 - 15.7 g/dL    Hematocrit 39.7 35.0 - 47.0 %    MCV 79 78 - 100 fL    MCH 27.6 26.5 - 33.0 pg    MCHC 34.8 31.5 - 36.5 g/dL    RDW 14.0 10.0 - 15.0 %    Platelet Count 282 150 - 450 10e3/uL    % Neutrophils 74 %    % Lymphocytes 8 %    % Monocytes 17 %    % Eosinophils 0 %    % Basophils 0 %    % Immature Granulocytes 1 %    NRBCs per 100 WBC 0 <1 /100    Absolute Neutrophils 10.4 (H) 1.6 - 8.3 10e3/uL    Absolute Lymphocytes 1.1 0.8 - 5.3 10e3/uL    Absolute Monocytes 2.4 (H) 0.0 - 1.3 10e3/uL    Absolute Eosinophils 0.0 0.0 - 0.7 10e3/uL    Absolute Basophils 0.0 0.0 - 0.2 10e3/uL    Absolute Immature Granulocytes 0.1 <=0.4 10e3/uL    Absolute NRBCs 0.0 10e3/uL   Lactic acid whole blood     Status: Abnormal   Result Value Ref Range    Lactic Acid 3.1 (H) 0.7 - 2.0 mmol/L   UA with Microscopic reflex to Culture     Status: Abnormal    Specimen: Urine, Midstream   Result Value Ref Range    Color Urine Colorless Colorless, Straw, Light Yellow, Yellow    Appearance Urine Clear Clear    Glucose Urine Negative Negative mg/dL    Bilirubin Urine Negative Negative    Ketones Urine Negative Negative mg/dL    Specific Gravity Urine 1.017 1.003 - 1.035     Blood Urine Moderate (A) Negative    pH Urine 8.0 (H) 5.0 - 7.0    Protein Albumin Urine Negative Negative mg/dL    Urobilinogen Urine Normal Normal, 2.0 mg/dL    Nitrite Urine Negative Negative    Leukocyte Esterase Urine Negative Negative    Bacteria Urine Few (A) None Seen /HPF    RBC Urine 1 <=2 /HPF    WBC Urine 5 <=5 /HPF    Squamous Epithelials Urine <1 <=1 /HPF    Narrative    Urine Culture not indicated   Asymptomatic COVID-19 Virus (Coronavirus) by PCR Nose     Status: Normal    Specimen: Nose; Swab   Result Value Ref Range    SARS CoV2 PCR Negative Negative    Narrative    Testing was performed using the jose  SARS-CoV-2 & Influenza A/B Assay on the jose  Lois  System.  This test should be ordered for the detection of SARS-COV-2 in individuals who meet SARS-CoV-2 clinical and/or epidemiological criteria. Test performance is unknown in asymptomatic patients.  This test is for in vitro diagnostic use under the FDA EUA for laboratories certified under CLIA to perform moderate and/or high complexity testing. This test has not been FDA cleared or approved.  A negative test does not rule out the presence of PCR inhibitors in the specimen or target RNA in concentration below the limit of detection for the assay. The possibility of a false negative should be considered if the patient's recent exposure or clinical presentation suggests COVID-19.  [facility] are certified under the Clinical Laboratory Improvement Amendments of 1988 (CLIA-88) as qualified to perform moderate and/or high complexity laboratory testing.   CBC with platelets differential     Status: Abnormal    Narrative    The following orders were created for panel order CBC with platelets differential.  Procedure                               Abnormality         Status                     ---------                               -----------         ------                     CBC with platelets and d...[[id number]]  Abnormal             Final result                 Please view results for these tests on the individual orders.       Cardiac Testing:      ASSESSMENT:  Principal Problem:    Acute midline low back pain, unspecified whether sciatica present  Active Problems:    Hypokalemia    Hyponatremia    Vomiting    Bony metastasis (H)    Acute kidney injury (H)    Closed fracture of pedicle of lumbar vertebra, initial encounter (H)     PLAN:   1) Acute on chronic back pain  CT showed chronic L1 pathologic compression fracture and L3 pedicles fracture  Continue pain control  PT/OT evaluation    2) Nausea, emesis  Likely due to pain  Continue IV fluids with NS  Zofran and Reglan prn nausea    3) Hypokalemia, Hyponatremia  Replete potassium as needed  Patient on IV NS  Monitor     Medication Reconciliation Complete: Yes  DVT prophylaxis: on Eliquis  Dispostition: Med/surg 270  Code Status: Full    Level of Care: Observation    I performed this consultation using real-time telehealth tools, including a live video connection between my location and the patient's location. My location is a different campus than the patient's campus. This consultation happened during the COVID health crisis.  Video start time: 03/18/2022 02:12   Video end time: 03/18/2022 02:19    As the provider for this telehealth service, I attest that I introduced myself to the patient, provided my credentials, disclosed my location, and determined that, based on a review of the patients chart and/or a discussion with members of the patient's treatment team, telemedicine via a real-time, two-way, interactive audio and video platform is an appropriate and effective means of providing this service. The patient and I mutually agree this visit is appropriate for telemedicine as well.      Vonnie Rea,   March 18, 2022

## 2022-03-18 NOTE — PLAN OF CARE
Goal Outcome Evaluation:    Plan of Care Reviewed With: patient     Overall Patient Progress: no change    Outcome Evaluation: Pt continues to have 6/10 back pain with PRN dilaudid and tylenol. Trouble sleeping tonight due to pain. VS stable

## 2022-03-18 NOTE — PHARMACY-VANCOMYCIN DOSING SERVICE
"Pharmacy Vancomycin Initial Note  Date of Service 2022  Patient's  1960  61 year old, female    Indication: Sepsis    Current estimated CrCl = Estimated Creatinine Clearance: 50.1 mL/min (A) (based on SCr of 1.06 mg/dL (H)).    Creatinine for last 3 days  3/17/2022:  6:51 PM Creatinine 1.06 mg/dL    Recent Vancomycin Level(s) for last 3 days  No results found for requested labs within last 72 hours.      Vancomycin IV Administrations (past 72 hours)      No vancomycin orders with administrations in past 72 hours.                Nephrotoxins and other renal medications (From now, onward)    Start     Dose/Rate Route Frequency Ordered Stop    22  vancomycin (VANCOCIN) 1000 mg in dextrose 5% 200 mL PREMIX         1,000 mg  200 mL/hr over 1 Hours Intravenous EVERY 18 HOURS 22  piperacillin-tazobactam (ZOSYN) intermittent infusion 4.5 g        Note to Pharmacy: For [facility], [facility] and [facility]: For Zosyn-naive patients, use the \"Zosyn initial dose + extended infusion\" order panel.    4.5 g  200 mL/hr over 30 Minutes Intravenous ONCE 22 1933            Contrast Orders - past 72 hours (72h ago, onward)            Start     Dose/Rate Route Frequency Ordered Stop    22  iopamidol (ISOVUE-370) solution 100 mL         100 mL Intravenous ONCE 22          Insight[employer] Prediction of Planned Initial Vancomycin Regimen  Loading dose: N/A  Regimen: 1000 mg IV every 18 hours.  Start time: 20:57 on 2022  Exposure target: AUC24 (range)400-600 mg/L.hr   AUC24,ss: 487 mg/L.hr  Probability of AUC24 > 400: 73 %  Ctrough,ss: 15 mg/L  Probability of Ctrough,ss > 20: 21 %  Probability of nephrotoxicity (Lodise TRACIE ): 10 %          Plan:  1. Start vancomycin  1000 mg IV q18h.   2. Vancomycin monitoring method: AUC  3. Vancomycin therapeutic monitoring goal: 400-600 mg*h/L  4. Pharmacy will check vancomycin levels as appropriate in 1-3 Days.  "   5. Serum creatinine levels will be ordered daily for the first week of therapy and at least twice weekly for subsequent weeks.      Nallely Mcneill RPH

## 2022-03-18 NOTE — PROGRESS NOTES
03/18/22 1000   Quick Adds   Type of Visit Initial PT Evaluation       Present no   Living Environment   People in Home spouse   Current Living Arrangements house   Home Accessibility no concerns   Transportation Anticipated family or friend will provide   Living Environment Comments no stairs to enter home, mainly stays on 1 level. Has upstairs apartment but rarely has to go upstairs. Denies any concerns.    Self-Care   Usual Activity Tolerance fair   Current Activity Tolerance poor   Fall history within last six months no   Activity/Exercise/Self-Care Comment Pt had PT in [place] months ago and was given some exercises for her back and for strengthening but has stopped doing them. Is willing to start doing them again. Pt wears an apple watch does not get more than 5000 steps per day.   General Information   Onset of Illness/Injury or Date of Surgery 03/17/22   Referring Physician Dr Rea   Patient/Family Therapy Goals Statement (PT) go home   Pertinent History of Current Problem (include personal factors and/or comorbidities that impact the POC) PT order for consult for mobility/falls and DC recomendations to facilitate a safe discharge. Pt is a 61 year old female with metastatic breast presents with back pain from ED. She is currently taking an oral chemotherapy. She had vomiting and severe pain in her back yesterday (in afternoon) and that increased the back pain. She denied falls. During one of her emesis episodes, she heard a pop in her back. The pain is located in the lumbar region. Pain is moderate to severe. Worse with movement, better with immobilization. Patient has Morphine patch. Patient also takes oral Morphine at home. Pain was not controlled at home. CT showed chronic L1 pathologic compression fracture and L3 pedicles fracture.   Existing Precautions/Restrictions no known precautions/restrictions   General Observations Pt lying on her left side in bed upon PT arrival  and has ice pack on low back. Pt tearful and reports it is because of a mean text she just received from her .    Cognition   Affect/Mental Status (Cognition) WNL   Orientation Status (Cognition) oriented x 4   Pain Assessment   Patient Currently in Pain Yes, see Vital Sign flowsheet  (pt noted 7/10 LBP R>L lying in bed, sitting, standing, amb)   Posture    Posture Comments fair sitting and standing posture   Range of Motion (ROM)   Range of Motion ROM is WFL   Strength (Manual Muscle Testing)   Strength (Manual Muscle Testing) No deficits observed during functional mobility   Strength Comments 5/5 MMT B ankle DF and 1st toe extension   Bed Mobility   Comment, (Bed Mobility) independent bed mobility   Transfers   Transfers no deficits identified   Gait/Stairs (Locomotion)   [place] Level (Gait) independent   Assistive Device (Gait) walker, front-wheeled  (also did ambulation without assistive device)   Comment, (Gait/Stairs) ambulated in room with FWW and without FWW and pt did not notice a difference in her LBP, she denied LE pain with ambulation   Balance   Balance no deficits were identified   Clinical Impression   Criteria for Skilled Therapeutic Intervention Evaluation only;No problems identified which require skilled intervention;Current level of function same as previous level of function   PT Diagnosis (PT) severe back pain   Influenced by the following impairments pain   Functional limitations due to impairments general mobility   Clinical Presentation (PT Evaluation Complexity) Stable/Uncomplicated   Clinical Presentation Rationale clinical judgement, pt independent with bed mobility, transfers and ambulation   Clinical Decision Making (Complexity) low complexity   Clinical Impression Comments PT eval order for mobility and DC recommendations. Pt with severe LBP leading to ED visit and hospital stay. Pt with chronic L1 compression fx and compression deformities of L3, L4a nd B pedicle fx L3 non  displaced. Pt has FWW and cane at home to use if needed. No further skilled PT needed today as pt is independent with bed mobility and transfers and ambulation. Pt noted she had already been up to BR by herself.    PT Discharge Planning   PT Discharge Recommendation (DC Rec) home   PT Rationale for DC Rec Pt independent with bed mobility, transfers and ambulation, has FWW and cane if needed but hasn't used for months. Main issue is LBP, denied LE pain with PT during evaluation.    PT Brief overview of current status PT eval order for mobility and DC recommendations. Pt with severe LBP leading to ED visit and hospital stay. Pt with chronic L1 compression fx and compression deformities of L3, L4a nd B pedicle fx L3 non displaced. Pt has FWW and cane at home to use if needed. No further skilled PT needed today as pt is independent with bed mobility and transfers and ambulation. Pt noted she had already been up to BR by herself.    Plan of Care Review   Plan of Care Reviewed With patient   Total Evaluation Time   Total Evaluation Time (Minutes) 15

## 2022-03-18 NOTE — PROGRESS NOTES
S-(situation): Patient arrives to room 270 via cart from ED    B-(background): Pt came into ER with C/O back pain.    A-(assessment): VS stable. Back pain rated at 6/10. FX of L1, 3,4. Steady on feet and able to ambulate without assistance.      R-(recommendations): Orders reviewed with MD. Will monitor patient per MD orders.     Inpatient nursing criteria listed below were met:    Health care directives status obtained and documented: Yes  SCD's Documented: Yes  Skin issues/needs documented:NA  Isolation addressed and Signage used: NA  Fall Prevention: Care plan updated Yes Education given and documented Yes  Care Plan initiated and Co-Morbidities added: Yes  Education Assessment documented:Yes  Admission Education Documented: Yes  If present CAUTI/CLABI Education done: NA  New medication patient education completed and documented (Possible Side Effects of Common Medications handout): Yes  Allergies Reviewed: Yes  Admission Medication Reconciliation completed: Yes  Home medications if not able to send immediately home with family stored here: NA  Reminder note placed in discharge instructions regarding home meds: Yes  Individualized care needs/preferences addressed and charted: Yes

## 2022-03-18 NOTE — PLAN OF CARE
S-(situation): OT orders received, chart reviewed.     B-(background): Shaneka Alvarez is a 61 year old female with metastatic breast presents with back pain. She is currently taking an oral chemotherapy. She had vomiting and severe pain in her back yesterday (in afternoon). She denied falls. During one of her emesis episodes, she heard a pop in her back. The pain is located in the lumbar region. Pain is moderate to severe    A-(assessment): Discussed with Interdisciplinary team. Patient up in room IND, toileting and completing additional ADLs at IND level. Patient denies concerns for ADLs, lives with spouse who is able to assist prn .     R-(recommendations): Will defer OT evaluation and complete orders.     Thank you for the referral,    MARTHA Mary, OTR/L  [facility] - Occupational Therapy    Phone: [phone]  Email: [email or web address]

## 2022-03-19 VITALS
RESPIRATION RATE: 16 BRPM | WEIGHT: 139.3 LBS | HEART RATE: 77 BPM | OXYGEN SATURATION: 94 % | TEMPERATURE: 97.6 F | HEIGHT: 62 IN | DIASTOLIC BLOOD PRESSURE: 62 MMHG | BODY MASS INDEX: 25.64 KG/M2 | SYSTOLIC BLOOD PRESSURE: 98 MMHG

## 2022-03-19 LAB
ALBUMIN SERPL-MCNC: 3.6 G/DL (ref 3.4–5)
ALP SERPL-CCNC: 60 U/L (ref 40–150)
ALT SERPL W P-5'-P-CCNC: 25 U/L (ref 0–50)
ANION GAP SERPL CALCULATED.3IONS-SCNC: 9 MMOL/L (ref 3–14)
AST SERPL W P-5'-P-CCNC: 52 U/L (ref 0–45)
BILIRUB SERPL-MCNC: 0.5 MG/DL (ref 0.2–1.3)
BUN SERPL-MCNC: 6 MG/DL (ref 7–30)
CALCIUM SERPL-MCNC: 8.6 MG/DL (ref 8.5–10.1)
CHLORIDE BLD-SCNC: 102 MMOL/L (ref 94–109)
CO2 SERPL-SCNC: 25 MMOL/L (ref 20–32)
CREAT SERPL-MCNC: 0.67 MG/DL (ref 0.52–1.04)
ERYTHROCYTE [DISTWIDTH] IN BLOOD BY AUTOMATED COUNT: 14.1 % (ref 10–15)
GFR SERPL CREATININE-BSD FRML MDRD: >90 ML/MIN/1.73M2
GLUCOSE BLD-MCNC: 56 MG/DL (ref 70–99)
HCT VFR BLD AUTO: 34.1 % (ref 35–47)
HGB BLD-MCNC: 10.5 G/DL (ref 11.7–15.7)
MCH RBC QN AUTO: 27.1 PG (ref 26.5–33)
MCHC RBC AUTO-ENTMCNC: 30.8 G/DL (ref 31.5–36.5)
MCV RBC AUTO: 88 FL (ref 78–100)
PLATELET # BLD AUTO: 151 10E3/UL (ref 150–450)
POTASSIUM BLD-SCNC: 3.1 MMOL/L (ref 3.4–5.3)
PROT SERPL-MCNC: 6.8 G/DL (ref 6.8–8.8)
RBC # BLD AUTO: 3.88 10E6/UL (ref 3.8–5.2)
SODIUM SERPL-SCNC: 136 MMOL/L (ref 133–144)
WBC # BLD AUTO: 5.4 10E3/UL (ref 4–11)

## 2022-03-19 PROCEDURE — 250N000013 HC RX MED GY IP 250 OP 250 PS 637: Performed by: INTERNAL MEDICINE

## 2022-03-19 PROCEDURE — 99217 PR OBSERVATION CARE DISCHARGE: CPT | Performed by: NURSE PRACTITIONER

## 2022-03-19 PROCEDURE — 80053 COMPREHEN METABOLIC PANEL: CPT | Performed by: INTERNAL MEDICINE

## 2022-03-19 PROCEDURE — 85027 COMPLETE CBC AUTOMATED: CPT | Performed by: INTERNAL MEDICINE

## 2022-03-19 PROCEDURE — 250N000013 HC RX MED GY IP 250 OP 250 PS 637: Performed by: NURSE PRACTITIONER

## 2022-03-19 PROCEDURE — 99207 PR CDG-CODE CATEGORY CHANGED: CPT | Performed by: NURSE PRACTITIONER

## 2022-03-19 PROCEDURE — 250N000011 HC RX IP 250 OP 636: Performed by: NURSE PRACTITIONER

## 2022-03-19 RX ORDER — HEPARIN SODIUM,PORCINE 10 UNIT/ML
5-10 VIAL (ML) INTRAVENOUS
Status: DISCONTINUED | OUTPATIENT
Start: 2022-03-19 | End: 2022-03-19 | Stop reason: HOSPADM

## 2022-03-19 RX ORDER — LIDOCAINE 4 G/G
1 PATCH TOPICAL EVERY 24 HOURS
Qty: 30 PATCH | Refills: 0 | Status: SHIPPED | OUTPATIENT
Start: 2022-03-19 | End: 2022-11-18

## 2022-03-19 RX ORDER — HEPARIN SODIUM,PORCINE 10 UNIT/ML
5-10 VIAL (ML) INTRAVENOUS EVERY 24 HOURS
Status: DISCONTINUED | OUTPATIENT
Start: 2022-03-19 | End: 2022-03-19 | Stop reason: HOSPADM

## 2022-03-19 RX ORDER — HEPARIN SODIUM (PORCINE) LOCK FLUSH IV SOLN 100 UNIT/ML 100 UNIT/ML
5-10 SOLUTION INTRAVENOUS
Status: DISCONTINUED | OUTPATIENT
Start: 2022-03-19 | End: 2022-03-19 | Stop reason: HOSPADM

## 2022-03-19 RX ORDER — POTASSIUM CHLORIDE 1500 MG/1
20 TABLET, EXTENDED RELEASE ORAL ONCE
Status: COMPLETED | OUTPATIENT
Start: 2022-03-19 | End: 2022-03-19

## 2022-03-19 RX ADMIN — PROPRANOLOL HYDROCHLORIDE 80 MG: 80 CAPSULE, EXTENDED RELEASE ORAL at 08:58

## 2022-03-19 RX ADMIN — BUSPIRONE HYDROCHLORIDE 15 MG: 5 TABLET ORAL at 08:59

## 2022-03-19 RX ADMIN — Medication 5 ML: at 10:23

## 2022-03-19 RX ADMIN — APIXABAN 5 MG: 5 TABLET, FILM COATED ORAL at 08:59

## 2022-03-19 RX ADMIN — POTASSIUM CHLORIDE 20 MEQ: 1500 TABLET, EXTENDED RELEASE ORAL at 08:59

## 2022-03-19 RX ADMIN — VENLAFAXINE HYDROCHLORIDE 225 MG: 150 CAPSULE, EXTENDED RELEASE ORAL at 08:58

## 2022-03-19 RX ADMIN — POTASSIUM CHLORIDE 10 MEQ: 750 TABLET, EXTENDED RELEASE ORAL at 08:59

## 2022-03-19 RX ADMIN — Medication 1 TABLET: at 08:58

## 2022-03-19 ASSESSMENT — ACTIVITIES OF DAILY LIVING (ADL)
ADLS_ACUITY_SCORE: 8
ADLS_ACUITY_SCORE: 6

## 2022-03-19 NOTE — PROGRESS NOTES
S-(situation): Patient discharged to home via personal transport with family friend    B-(background): Hx of cancer, cervical fracture, hypokalemia, vomitting    A-(assessment): Alert and oriented x4, denies nausea or vomitting, independent in room. Port de-accessed, cardiac/resp WNL, Pain controlled.     R-(recommendations): Discharge instructions reviewed with patient. Listed belongings gathered and returned to patient. yes         Discharge Nursing Criteria:     Care Plan and Patient education resolved: Yes    New Medications- pt has been educated about purpose and side effects: Yes    Vaccines  Influenza status verified at discharge:  Yes    Intentionally Retained Items (examples: Drains, Wound packing, ureteral stents, reeves, PICC line or IV)  Patient was sent home with port left in place.   Follow up appointment made for removal: No    MISC  Prescriptions if needed, hard copies sent with patient  Yes  Home medications returned to patient: Yes  Medication Bin checked and emptied on discharge Yes  Patient reports post-discharge pain management plan is effective: Yes

## 2022-03-19 NOTE — PROVIDER NOTIFICATION
Patient has a left implanted port. It has infiltrated what appears to be a large amount of NS. The diameter of the infiltrate is approximately 15.24 cm.  It is not considered an extravasation but is quite large. The port has been DE accessed.  It was not heparinized of course, it will need to be reaccessed for that.     Per MD who was notified leave de accessed and have day time provider evaluate when to re access and heparinize.

## 2022-03-19 NOTE — PROGRESS NOTES
Return home medications to patient at discharge. Medications are currently stored in Pharmacy bin, PLEASE RETURN

## 2022-03-19 NOTE — DISCHARGE SUMMARY
[facility]  Hospitalist Discharge Summary      Date of Admission:  3/17/2022  Date of Discharge:  3/19/2022  Discharging Provider: Spenser Ashraf NP  Discharge Service: Hospitalist Service    Discharge Diagnoses   Principal Problem:    Closed fracture of pedicle of lumbar vertebra, initial encounter (H)  Active Problems:    Hypokalemia    Hyponatremia    Vomiting    Bony metastasis (H)    Sepsis without acute organ dysfunction (H)-possible     Acute midline low back pain, unspecified whether sciatica present    Elevated serum creatinine      Follow-ups Needed After Discharge   Follow-up Appointments     Follow-up and recommended labs and tests       Follow up with oncology team within 7 days for hospital follow- up.  The   following labs/tests are recommended: Consider repeat potassium level.             Unresulted Labs Ordered in the Past 30 Days of this Admission     Date and Time Order Name Status Description    3/17/2022  7:02 PM Blood Culture Line, Other Preliminary     3/17/2022  7:02 PM Blood Culture Line, venous Preliminary       These results will be followed up by Oncology team    Discharge Disposition   Discharged to home  Condition at discharge: Stable    Hospital Course            Shaneka Alvarez is a 61 year old female with breast cancer with known bone metastases who presented to the ED 3/18/22 with back pain. She is currently taking an oral chemotherapy. She had vomiting and severe pain in her back the day prior to admission. She denied any falls or other trauma. During one of her emesis episodes, she heard a pop in her back. The pain is located in the lumbar region. Pain is moderate to severe. Worse with movement, better with immobilization. Patient has buprenorphine patch. Patient also takes oral Morphine at home. Pain was not controlled at home. In the ED, patient was noted to have elevated creatinine of 1.06. Sodium was low at 130. Potassium low at 2.7. She was  noted to have elevated lactic of 3.1 which improved with IV fluids. WBC mildly elevated at 14.0. There was some initial concern for sepsis so she was started on IV Vancomcyin and Zosyn for sepsis of unknown etiology. Imaging of the lower back showed stable chronic L1 pathologic compression fracture and possible acute pathologic fractures noted in the L3 pedicles bilaterally. ED provider spoke with neurosurgery who recommended conservative treatment and pain control. Admitted for pain control and further workup of sepsis. Patient with significant improvement in back pain after starting Lidoderm patches. She is ambulating without difficulty. Denies shortness of breath and patient is maintaining oxygen saturations above 90% on room air.  Denies nausea and is tolerating oral intake. Remained afebrile throughout hospitalization. Procalcitonin negative. WBC normal the following day.  Lactic acid normalized with IV fluids. Suspect elevated lactic acid secondary to dehydration given recent vomiting. Suspect leukocytosis elevated secondary to trauma/pain. No evidence of bacterial infection and antibiotics were stopped. Patient requesting hospital discharge and is medically stable. She was given prescription for Lidoderm patch once daily to lower back and will follow up with oncology team for hospital follow up. All other PTA medications were continued after discharge.     Spenser Ashraf NP  Hospitalist Service  [facility]  Securely message with the Vocera Web Console (learn more here)  Text page via [employer] Paging/Directory           Consultations This Hospital Stay   PHARMACY TO DOSE VANCO  PHYSICAL THERAPY ADULT IP CONSULT  OCCUPATIONAL THERAPY ADULT IP CONSULT    Code Status   Full Code    Time Spent on this Encounter   ISpenser NP, personally saw the patient today and spent greater than 30 minutes discharging this patient.       Spenser Ashraf NP  [address]  SURGICAL  [address] [place] [address]  Phone: [phone]  ______________________________________________________________________    Physical Exam   Vital Signs: Temp: 97.6  F (36.4  C) Temp src: Oral BP: 98/62 Pulse: 77   Resp: 16 SpO2: 94 % O2 Device: None (Room air)    Weight: 139 lbs 4.8 oz  Constitutional: awake, alert, cooperative, no apparent distress, and appears stated age  Respiratory: No increased work of breathing, good air exchange, clear to auscultation bilaterally, no crackles or wheezing  Cardiovascular: regular rate and rhythm and normal S1 and S2  GI: normal bowel sounds, non-distended and non-tender  Skin: normal skin color, texture, turgor  Musculoskeletal: no lower extremity pitting edema present  Neurologic: Awake, alert, oriented to name, place and time.         Primary Care Physician   [facility]    Discharge Orders      Reason for your hospital stay    You were in the hospital for uncontrolled back pain and improved     Follow-up and recommended labs and tests     Follow up with oncology team within 7 days for hospital follow- up.  The following labs/tests are recommended: Consider repeat potassium level.     Activity    Your activity upon discharge: activity as tolerated     Diet    Follow this diet upon discharge: Orders Placed This Encounter      Regular Diet Adult       Significant Results and Procedures   Most Recent 3 CBC's:Recent Labs   Lab Test 03/19/22  0648 03/18/22  0824 03/17/22  1851   WBC 5.4 8.7 14.0*   HGB 10.5* 10.4* 13.8   MCV 88 85 79    172 282     Most Recent 3 BMP's:Recent Labs   Lab Test 03/19/22  0649 03/18/22  1134 03/18/22  0410 03/17/22  1851    138  --  130*   POTASSIUM 3.1* 3.3* 3.5 2.7*   CHLORIDE 102 106  --  91*   CO2 25 26  --  24   BUN 6* 12  --  28   CR 0.67 0.70  --  1.06*   ANIONGAP 9 6  --  15*   RAFAELA 8.6 8.1*  --  10.3*   GLC 56* 66*  --  119*   ,   Results for orders placed or performed during the hospital  encounter of 03/17/22   CT Abdomen Pelvis w Contrast    Narrative    EXAM: CT ABDOMEN PELVIS W CONTRAST  LOCATION: [facility]  DATE/TIME: 3/17/2022 8:25 PM    INDICATION: Abdominal distention. Abdominal pain, acute, nonlocalized.  COMPARISON: None available.  TECHNIQUE: CT scan of the abdomen and pelvis was performed after the injection of 86 mL Isovue 370 intravenously. Multiplanar reformats were obtained. Dose reduction techniques were used.    FINDINGS:   LOWER CHEST: Partially visualized bilateral breast implants. Small hiatal hernia.    ABDOMEN/PELVIS:    HEPATOBILIARY: No suspicious focal hepatic lesion. The gallbladder is surgically absent.    PANCREAS: No main pancreatic ductal dilatation or definite solid pancreatic mass.    SPLEEN: No splenomegaly.    ADRENAL GLANDS: No adrenal nodules.    KIDNEYS/BLADDER: No radiodense kidney/ureteral stones or hydronephrosis in either kidney. Bilateral subcentimeter hypodense foci in both kidneys, too small to characterize. The urinary bladder is distended, otherwise unremarkable.    BOWEL: No abnormally dilated bowel loops. The appendix is visualized, contain few diverticula.    PERITONEUM: No evidence of free fluid in the abdomen and pelvis. No free peritoneal or portal venous gas.    PELVIC ORGANS: Retroverted uterus.    VASCULATURE: Scattered atherosclerotic vascular calcification of the abdominal aorta and iliac vessels.    LYMPH NODES: No significant abdominopelvic lymphadenopathy.    MUSCULOSKELETAL: Diffuse sclerotic osseous metastases with significant compression deformity of L1 with approximately 80% vertebral height loss, better seen and evaluated on same-day lumbar spine CT.      Impression    IMPRESSION:   1. No acute pathology in the abdomen or pelvis.  2. Diffuse sclerotic osseous metastases with significant compression deformity of L1 vertebra, better seen and evaluated on same-day lumbar spine CT.   CT Lumbar spine w/o  contrast*    Narrative    EXAM: CT LUMBAR SPINE W/O CONTRAST  LOCATION: [facility]  DATE/TIME: 3/17/2022 8:26 PM    INDICATION: Back pain. Metastatic breast cancer. Tingling in the legs arms and face since yesterday. Leg weakness.  COMPARISON: PET/CT 12/21/2021.  TECHNIQUE: Routine CT Lumbar Spine without IV contrast. Multiplanar reformats. Dose reduction techniques were used.    FINDINGS:  Nomenclature is based on 5 lumbar type vertebral bodies. Widespread mixed lytic and sclerotic metastatic lesions in the visualized thoracolumbar spine, upper sacrum and bony pelvis similar to prior PET/CT 12/21/2021. Stable appearing chronic severe   pathologic L1 compression fracture. Stable mild chronic compression deformities along the superior bony endplates of L3 and L4 vertebral bodies as well. None of these compression deformities cause significant central canal narrowing. No definite new   compression fracture in the visualized thoracolumbar spine. Lateral lumbar alignment satisfactory.     There are nondisplaced fractures coursing through the L3 pedicles bilaterally best seen on series 4 images 54 through 59. These were not clearly evident previously. These are likely pathologic. No extraspinal soft tissue abnormality.    The central spinal canal in the visualized thoracolumbar region and upper sacrum appears adequate. Mild narrowing along the superior L4 level. No significant right or left-sided foraminal narrowing identified.       Impression    IMPRESSION:  1.  Widespread lytic and sclerotic bony metastatic lesions in the visualized thoracolumbar spine, sacrum and upper bony pelvis similar to prior PET/CT 12/21/2021.  2.  Stable relatively severe chronic L1 pathologic compression fracture. Stable mild compression deformity superior endplate of L3 and superior endplate of L4. The upper margin of the L4 fracture extends slightly into the spinal canal without high-grade   central canal  narrowing. This is unchanged.  3.  There are nondisplaced fractures through the L3 pedicles bilaterally which are likely pathologic. These were not clearly evident on the prior PET/CT. No associated alignment abnormality.  4.  Grossly the central spinal canal in the visualized thoracolumbar region and upper sacrum is adequate. No definite high-grade foraminal narrowing on the right or left side identified.       Discharge Medications   Current Discharge Medication List      START taking these medications    Details   Lidocaine (LIDOCARE) 4 % Patch Place 1 patch onto the skin every 24 hours To prevent lidocaine toxicity, patient should be patch free for 12 hrs daily.  Qty: 30 patch, Refills: 0    Associated Diagnoses: Closed fracture of pedicle of lumbar vertebra, initial encounter (H)         CONTINUE these medications which have NOT CHANGED    Details   albuterol (PROAIR HFA/PROVENTIL HFA/VENTOLIN HFA) 108 (90 Base) MCG/ACT inhaler Inhale 2 puffs into the lungs      bicalutamide (CASODEX) 50 MG tablet Take 3 tablets (150 mg) by mouth daily  Qty: 90 tablet, Refills: 3    Associated Diagnoses: Metastatic breast cancer (H)      busPIRone (BUSPAR) 15 MG tablet TAKE 1 TABLET(15 MG) BY MOUTH TWICE DAILY  Qty: 180 tablet, Refills: 1    Associated Diagnoses: Anxiety      calcium citrate-vitamin D (CITRACAL) 315-250 MG-UNIT TABS Take 1 tablet by mouth every morning       Cholecalciferol (VITAMIN D3 PO) Take 2,000 Units by mouth every morning       ELIQUIS ANTICOAGULANT 5 MG tablet TAKE 1 TABLET(5 MG) BY MOUTH TWICE DAILY  Qty: 60 tablet, Refills: 3    Associated Diagnoses: Acute pulmonary embolism without acute cor pulmonale, unspecified pulmonary embolism type (H)      ibuprofen (ADVIL/MOTRIN) 600 MG tablet Take 1 tablet (600 mg) by mouth every 6 hours as needed for moderate pain  Qty: 90 tablet, Refills: 3    Associated Diagnoses: Cancer associated pain      loratadine (CLARITIN) 10 MG tablet Take 10 mg by mouth daily       LORazepam (ATIVAN) 1 MG tablet Take 1 tablet (1 mg) by mouth every 4 hours as needed for anxiety or nausea  Qty: 30 tablet, Refills: 0    Associated Diagnoses: Chemotherapy induced nausea and vomiting      magnesium 250 MG tablet Take 1 tablet by mouth every morning       morphine (MSIR) 15 MG IR tablet Take 1-2 tablets (15-30 mg) by mouth every 3 hours as needed for pain  Qty: 60 tablet, Refills: 0    Associated Diagnoses: Cancer associated pain; Metastatic breast cancer (H)      potassium chloride ER (KLOR-CON M) 10 MEQ CR tablet TAKE 1 TABLET(10 MEQ) BY MOUTH DAILY  Qty: 14 tablet, Refills: 0    Associated Diagnoses: Hypokalemia      promethazine (PHENERGAN) 25 MG tablet Take 25 mg by mouth every 6 hours as needed      propranolol ER (INDERAL LA) 80 MG 24 hr capsule TAKE 1 CAPSULE(80 MG) BY MOUTH EVERY MORNING  Qty: 90 capsule, Refills: 0    Associated Diagnoses: Migraine without aura and without status migrainosus, not intractable      senna-docusate (SENOKOT-S/PERICOLACE) 8.6-50 MG tablet Take 1-2 tablets by mouth 2 times daily as needed for constipation . Goal is to have a bowel movement every 1-2 days.    Associated Diagnoses: Carcinoma of breast metastatic to bone, unspecified laterality (H)      traZODone (DESYREL) 50 MG tablet TAKE 1 TABLET(50 MG) BY MOUTH AT BEDTIME  Qty: 30 tablet, Refills: 1    Associated Diagnoses: Insomnia, unspecified type      venlafaxine (EFFEXOR-XR) 75 MG 24 hr capsule Take 3 capsules (225 mg) by mouth daily  Qty: 270 capsule, Refills: 3    Associated Diagnoses: Anxiety; Major depressive disorder, recurrent episode, moderate (H)      acetaminophen (TYLENOL) 500 MG tablet Take 2 tablets (1,000 mg) by mouth 3 times daily  Qty: 180 tablet, Refills: 0    Associated Diagnoses: Carcinoma of breast metastatic to bone, unspecified laterality (H)      buprenorphine (BUTRANS) 5 MCG/HR WK patch Place 1 patch onto the skin every 7 days  Qty: 4 patch, Refills: 0    Associated Diagnoses: Bone  metastases (H); Cancer associated pain      HEMP OIL OR EXTRACT OR OTHER CBD CANNABINOID, NOT MEDICAL CANNABIS, CBD cream      Nerve Stimulator (NERIVIO) CRISTOPHER 1 each daily as needed  Qty: 1 each, Refills: 3    Associated Diagnoses: Migraine without aura and without status migrainosus, not intractable           Allergies   Allergies   Allergen Reactions     Bactrim [Sulfamethoxazole W/Trimethoprim]      Internal bleeding     Copaxone [Glatiramer] Hives

## 2022-03-21 ENCOUNTER — PATIENT OUTREACH (OUTPATIENT)
Dept: CARE COORDINATION | Facility: CLINIC | Age: 62
End: 2022-03-21
Payer: MEDICARE

## 2022-03-21 DIAGNOSIS — Z71.89 OTHER SPECIFIED COUNSELING: ICD-10-CM

## 2022-03-21 NOTE — PROGRESS NOTES
"Clinic Care Coordination Contact  [facility]: Post-Discharge Note  SITUATION                                                      Admission:    Admission Date: 03/17/22   Reason for Admission: Closed fracture of pedicle of lumbar vertebra, initial encounter  Discharge:   Discharge Date: 03/19/22  Discharge Diagnosis: Closed fracture of pedicle of lumbar vertebra, initial encounter    BACKGROUND                                                      Per hospital discharge summary and inpatient provider notes:    Shaneka Alvarez is a 61 year old female with metastatic breast presents with back pain. She is currently taking an oral chemotherapy. She had vomiting and severe pain in her back yesterday (in afternoon). She denied falls. During one of her emesis episodes, she heard a pop in her back. The pain is located in the lumbar region. Pain is moderate to severe. Worse with movement, better with immobilization. Patient has Morphine patch. Patient also takes oral Morphine at home. Pain was not controlled at home.    ASSESSMENT      Enrollment  Primary Care Care Coordination Status: Not a Candidate    Discharge Assessment  How are you doing now that you are home?: \"Doig well\"  How are your symptoms? (Red Flag symptoms escalate to triage hotline per guidelines): Improved  Do you feel your condition is stable enough to be safe at home until your provider visit?: Yes  Does the patient have their discharge instructions? : Yes  Does the patient have questions regarding their discharge instructions? : No  Were you started on any new medications or were there changes to any of your previous medications? : Yes  Does the patient have all of their medications?: Yes  Do you have questions regarding any of your medications? : No  Do you have all of your needed medical supplies or equipment (DME)?  (i.e. oxygen tank, CPAP, cane, etc.): Yes  Discharge follow-up appointment scheduled within 14 calendar days? : Yes  Discharge " Follow Up Appointment Date: 03/31/22  Discharge Follow Up Appointment Scheduled with?: Specialty Care Provider    Post-op ([facility] Only)  If the patient had a surgery or procedure, do they have any questions for a nurse?: No    PLAN                                                      Outpatient Plan:    Follow-up and recommended labs and tests      Follow up with oncology team within 7 days for hospital follow- up.  The   following labs/tests are recommended: Consider repeat potassium level.      Future Appointments   Date Time Provider Department Center   3/28/2022  1:00 PM NURSE ONLY [facility] [place]   3/28/2022  1:30 PM [address] INFUSION [facility] [place]   3/31/2022 10:30 AM [id number] [place] [address] O   4/5/2022  9:00 AM Dewayne Zepeda MD [facility]   5/11/2022 10:00 AM Alva Whitaker MD [facility]         For any urgent concerns, please contact our 24 hour nurse triage line: [phone] ([phone])         KIRBY Matt  [phone]  [facility]

## 2022-03-23 LAB
BACTERIA BLD CULT: NO GROWTH
BACTERIA BLD CULT: NO GROWTH

## 2022-03-28 ENCOUNTER — LAB (OUTPATIENT)
Dept: ONCOLOGY | Facility: CLINIC | Age: 62
End: 2022-03-28
Payer: MEDICARE

## 2022-03-28 ENCOUNTER — INFUSION THERAPY VISIT (OUTPATIENT)
Dept: INFUSION THERAPY | Facility: CLINIC | Age: 62
End: 2022-03-28
Attending: INTERNAL MEDICINE
Payer: MEDICARE

## 2022-03-28 VITALS
WEIGHT: 142.4 LBS | SYSTOLIC BLOOD PRESSURE: 125 MMHG | TEMPERATURE: 97.8 F | HEART RATE: 66 BPM | OXYGEN SATURATION: 98 % | RESPIRATION RATE: 18 BRPM | BODY MASS INDEX: 26.05 KG/M2 | DIASTOLIC BLOOD PRESSURE: 86 MMHG

## 2022-03-28 DIAGNOSIS — C50.919 METASTATIC BREAST CANCER: ICD-10-CM

## 2022-03-28 DIAGNOSIS — C79.51 BONE METASTASES: Primary | ICD-10-CM

## 2022-03-28 LAB
ALBUMIN SERPL-MCNC: 3.6 G/DL (ref 3.4–5)
CALCIUM SERPL-MCNC: 8.8 MG/DL (ref 8.5–10.1)
CREAT SERPL-MCNC: 0.67 MG/DL (ref 0.52–1.04)
GFR SERPL CREATININE-BSD FRML MDRD: >90 ML/MIN/1.73M2

## 2022-03-28 PROCEDURE — 82040 ASSAY OF SERUM ALBUMIN: CPT | Performed by: NURSE PRACTITIONER

## 2022-03-28 PROCEDURE — 96372 THER/PROPH/DIAG INJ SC/IM: CPT | Performed by: NURSE PRACTITIONER

## 2022-03-28 PROCEDURE — 82565 ASSAY OF CREATININE: CPT | Performed by: NURSE PRACTITIONER

## 2022-03-28 PROCEDURE — 82310 ASSAY OF CALCIUM: CPT | Performed by: NURSE PRACTITIONER

## 2022-03-28 PROCEDURE — 99207 PR NO CHARGE NURSE ONLY: CPT

## 2022-03-28 PROCEDURE — 99207 PR NO CHARGE LOS: CPT

## 2022-03-28 RX ORDER — HEPARIN SODIUM,PORCINE 10 UNIT/ML
3 VIAL (ML) INTRAVENOUS
Status: DISCONTINUED | OUTPATIENT
Start: 2022-03-28 | End: 2022-03-28 | Stop reason: HOSPADM

## 2022-03-28 RX ORDER — HEPARIN SODIUM (PORCINE) LOCK FLUSH IV SOLN 100 UNIT/ML 100 UNIT/ML
5 SOLUTION INTRAVENOUS ONCE
Status: COMPLETED | OUTPATIENT
Start: 2022-03-28 | End: 2022-03-28

## 2022-03-28 RX ADMIN — HEPARIN SODIUM (PORCINE) LOCK FLUSH IV SOLN 100 UNIT/ML 5 ML: 100 SOLUTION at 13:28

## 2022-03-28 ASSESSMENT — PAIN SCALES - GENERAL: PAINLEVEL: NO PAIN (0)

## 2022-03-28 NOTE — PROGRESS NOTES
".Patient's name and  were verified.  See Doc Flowsheet - IV assess for details.  IVAD accessed with 20G 3/4\" mcnair gripper plus needle  blood return positive: YES  Site without redness, tenderness or swelling: YES  flushed with 10cc NS and 5cc 100u/ml heparin  Needle: removed- not needed for treatment  Comments: Labs drawn.  Patient tolerated procedure without incident.      "

## 2022-03-28 NOTE — PROGRESS NOTES
Infusion Nursing Note:  Shaneka Alvarez presents today for Xgeva.    Patient seen by provider today: No   present during visit today: Not Applicable.    Note: Shaneka has been in the hospital recently.  She was worked up for sepsis but cultures were negative.  She is feeling ok today but feels like she is still recovering from there hospital stay.        Intravenous Access:  No Intravenous access at this visit.    Treatment Conditions:  Lab Results   Component Value Date     03/19/2022    POTASSIUM 3.1 (L) 03/19/2022    MAG 2.0 12/28/2021    CR 0.67 03/28/2022    RAFAELA 8.8 03/28/2022    BILITOTAL 0.5 03/19/2022    ALBUMIN 3.6 03/28/2022    ALT 25 03/19/2022    AST 52 (H) 03/19/2022     Results reviewed, labs MET treatment parameters, ok to proceed with treatment.      Post Infusion Assessment:  Patient tolerated injection without incident.       Discharge Plan:   [facility]  Patient is following up with Dr Zepeda 4/5/2022.  Discharged in stable condition accompanied by: self.      Bobbi Contreras, RN

## 2022-03-30 ENCOUNTER — PATIENT OUTREACH (OUTPATIENT)
Dept: PALLIATIVE CARE | Facility: CLINIC | Age: 62
End: 2022-03-30
Payer: MEDICARE

## 2022-03-30 DIAGNOSIS — G89.3 CANCER ASSOCIATED PAIN: ICD-10-CM

## 2022-03-30 DIAGNOSIS — C79.51 BONE METASTASES: ICD-10-CM

## 2022-03-30 DIAGNOSIS — C50.919 METASTATIC BREAST CANCER: ICD-10-CM

## 2022-03-30 RX ORDER — BUPRENORPHINE 5 UG/H
1 PATCH TRANSDERMAL
Qty: 4 PATCH | Refills: 0 | Status: SHIPPED | OUTPATIENT
Start: 2022-03-30 | End: 2022-04-20 | Stop reason: DRUGHIGH

## 2022-03-30 RX ORDER — MORPHINE SULFATE 15 MG/1
15-30 TABLET ORAL
Qty: 60 TABLET | Refills: 0 | Status: SHIPPED | OUTPATIENT
Start: 2022-03-30 | End: 2022-09-06

## 2022-03-30 NOTE — PROGRESS NOTES
Received message patient requesting refill of butrans and morphine.     Called her to discuss - She notes has had some trouble with getting the patch as local pharmacy, hoping we can try to make sure her pharmacy has the med. She does need it by Sunday.    She says she was recently in the hospital up in  [place] and they told her should could increase her MSIR dose. She was previously taking 15-30mg PRN, with instruction of hospital staff she is now taking 45mg up to TID if needed (notes she usually doesn't need TID). She does need a refill of this also.     She says she is also using OTC lidocaine patches, which help some.      Last refill butrans: 3/4/2022  Last refill morphine: 1/21/2022  Last office visit: 2/9/2022  Scheduled for follow up 5/11/2022     Will route request to MD for review.     Reviewed [place]  Report.    Spoke with pharmacy - they have 2 butrans patches available and said usually they can order and have more within 1 business day.

## 2022-03-31 ENCOUNTER — HOSPITAL ENCOUNTER (OUTPATIENT)
Dept: PET IMAGING | Facility: CLINIC | Age: 62
Setting detail: NUCLEAR MEDICINE
Discharge: HOME | End: 2022-03-31
Attending: INTERNAL MEDICINE | Admitting: INTERNAL MEDICINE
Payer: MEDICARE

## 2022-03-31 DIAGNOSIS — C50.919 METASTATIC BREAST CANCER: ICD-10-CM

## 2022-03-31 DIAGNOSIS — C79.51 BONE METASTASES: ICD-10-CM

## 2022-03-31 PROCEDURE — 74177 CT ABD & PELVIS W/CONTRAST: CPT

## 2022-03-31 PROCEDURE — A9552 F18 FDG: HCPCS | Performed by: INTERNAL MEDICINE

## 2022-03-31 PROCEDURE — 250N000011 HC RX IP 250 OP 636: Performed by: INTERNAL MEDICINE

## 2022-03-31 PROCEDURE — 74177 CT ABD & PELVIS W/CONTRAST: CPT | Mod: 26

## 2022-03-31 PROCEDURE — 343N000001 HC RX 343: Performed by: INTERNAL MEDICINE

## 2022-03-31 PROCEDURE — 78816 PET IMAGE W/CT FULL BODY: CPT | Mod: 26

## 2022-03-31 PROCEDURE — 71260 CT THORAX DX C+: CPT | Mod: 26

## 2022-03-31 PROCEDURE — 78816 PET IMAGE W/CT FULL BODY: CPT | Mod: PS

## 2022-03-31 RX ORDER — IOPAMIDOL 755 MG/ML
10-135 INJECTION, SOLUTION INTRAVASCULAR ONCE
Status: COMPLETED | OUTPATIENT
Start: 2022-03-31 | End: 2022-03-31

## 2022-03-31 RX ADMIN — IOPAMIDOL 86 ML: 755 INJECTION, SOLUTION INTRAVENOUS at 11:49

## 2022-03-31 RX ADMIN — FLUDEOXYGLUCOSE F-18 9.91 MCI.: 500 INJECTION, SOLUTION INTRAVENOUS at 10:36

## 2022-04-02 ENCOUNTER — MYC REFILL (OUTPATIENT)
Dept: FAMILY MEDICINE | Facility: OTHER | Age: 62
End: 2022-04-02
Payer: MEDICARE

## 2022-04-02 DIAGNOSIS — G43.009 MIGRAINE WITHOUT AURA AND WITHOUT STATUS MIGRAINOSUS, NOT INTRACTABLE: ICD-10-CM

## 2022-04-03 RX ORDER — PROPRANOLOL HYDROCHLORIDE 80 MG/1
CAPSULE, EXTENDED RELEASE ORAL
Qty: 90 CAPSULE | Refills: 0 | Status: SHIPPED | OUTPATIENT
Start: 2022-04-03 | End: 2022-06-29

## 2022-04-05 ENCOUNTER — VIRTUAL VISIT (OUTPATIENT)
Dept: ONCOLOGY | Facility: CLINIC | Age: 62
End: 2022-04-05
Payer: MEDICARE

## 2022-04-05 DIAGNOSIS — C50.912 BILATERAL MALIGNANT NEOPLASM OF BREAST IN FEMALE, UNSPECIFIED ESTROGEN RECEPTOR STATUS, UNSPECIFIED SITE OF BREAST (H): ICD-10-CM

## 2022-04-05 DIAGNOSIS — C50.911 BILATERAL MALIGNANT NEOPLASM OF BREAST IN FEMALE, UNSPECIFIED ESTROGEN RECEPTOR STATUS, UNSPECIFIED SITE OF BREAST (H): ICD-10-CM

## 2022-04-05 DIAGNOSIS — C50.919 METASTATIC BREAST CANCER: ICD-10-CM

## 2022-04-05 DIAGNOSIS — E87.6 HYPOKALEMIA: ICD-10-CM

## 2022-04-05 DIAGNOSIS — Z79.899 ENCOUNTER FOR MONITORING CARDIOTOXIC DRUG THERAPY: Primary | ICD-10-CM

## 2022-04-05 DIAGNOSIS — D70.1 CHEMOTHERAPY-INDUCED NEUTROPENIA (H): ICD-10-CM

## 2022-04-05 DIAGNOSIS — Z51.81 ENCOUNTER FOR MONITORING CARDIOTOXIC DRUG THERAPY: Primary | ICD-10-CM

## 2022-04-05 DIAGNOSIS — T45.1X5A CHEMOTHERAPY-INDUCED NEUTROPENIA (H): ICD-10-CM

## 2022-04-05 PROCEDURE — 99214 OFFICE O/P EST MOD 30 MIN: CPT | Mod: 95 | Performed by: INTERNAL MEDICINE

## 2022-04-05 RX ORDER — MEPERIDINE HYDROCHLORIDE 25 MG/ML
25 INJECTION INTRAMUSCULAR; INTRAVENOUS; SUBCUTANEOUS EVERY 30 MIN PRN
Status: CANCELLED | OUTPATIENT
Start: 2022-04-14

## 2022-04-05 RX ORDER — ALBUTEROL SULFATE 0.83 MG/ML
2.5 SOLUTION RESPIRATORY (INHALATION)
Status: CANCELLED | OUTPATIENT
Start: 2022-04-14

## 2022-04-05 RX ORDER — EPINEPHRINE 1 MG/ML
0.3 INJECTION, SOLUTION INTRAMUSCULAR; SUBCUTANEOUS EVERY 5 MIN PRN
Status: CANCELLED | OUTPATIENT
Start: 2022-04-14

## 2022-04-05 RX ORDER — NALOXONE HYDROCHLORIDE 0.4 MG/ML
0.2 INJECTION, SOLUTION INTRAMUSCULAR; INTRAVENOUS; SUBCUTANEOUS
Status: CANCELLED | OUTPATIENT
Start: 2022-04-14

## 2022-04-05 RX ORDER — HEPARIN SODIUM,PORCINE 10 UNIT/ML
5 VIAL (ML) INTRAVENOUS
Status: CANCELLED | OUTPATIENT
Start: 2022-04-14

## 2022-04-05 RX ORDER — DIPHENHYDRAMINE HYDROCHLORIDE 50 MG/ML
50 INJECTION INTRAMUSCULAR; INTRAVENOUS
Status: CANCELLED
Start: 2022-04-14

## 2022-04-05 RX ORDER — ALBUTEROL SULFATE 90 UG/1
1-2 AEROSOL, METERED RESPIRATORY (INHALATION)
Status: CANCELLED
Start: 2022-04-14

## 2022-04-05 RX ORDER — HEPARIN SODIUM (PORCINE) LOCK FLUSH IV SOLN 100 UNIT/ML 100 UNIT/ML
5 SOLUTION INTRAVENOUS
Status: CANCELLED | OUTPATIENT
Start: 2022-04-14

## 2022-04-05 RX ORDER — METHYLPREDNISOLONE SODIUM SUCCINATE 125 MG/2ML
125 INJECTION, POWDER, LYOPHILIZED, FOR SOLUTION INTRAMUSCULAR; INTRAVENOUS
Status: CANCELLED
Start: 2022-04-14

## 2022-04-05 NOTE — NURSING NOTE
Patient denies any changes since echeck-in regarding medication and allergies and states all information entered during echeck-in remains accurate.    Sonia Watson, VF

## 2022-04-05 NOTE — PROGRESS NOTES
Shaneka is a 61 year old who is being evaluated via a billable video visit.  Currently in [facility].    How would you like to obtain your AVS? MyChart  If the video visit is dropped, the invitation should be resent by: Text to cell phone: [phone]  Will anyone else be joining your video visit? TATYANA Lee    Video Start Time: 8:49 AM  Video-Visit Details    Type of service:  Video Visit    Video End Time:9:02 AM    Originating Location (pt. Location): Home    Distant Location (provider location):  [facility]     Platform used for Video Visit: CHORD

## 2022-04-05 NOTE — PROGRESS NOTES
ONCOLOGY FOLLOW UP NOTE: 4/05/22        I took the history from reviewing the previous notes that she was following with Dr. Amaya.  I have copied and updated from prior notes.    ONCOLOGY History:    1.  January 2006:  Diagnosed with Stage IIB, T2 N1 M0 invasive lobular carcinoma of the right breast.  Final pathology showed a 4.5 x 3.8 x 2.5 cm, 01/14 lymph nodes positive.  Estrogen, progesterone receptor positive, HER-2 negative.     2.  Genetics.  BRCA1 and 2 mutations not detected. 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       THERAPY TO DATE:   1.  2006:  ECOG 2104 protocol of dose-dense Adriamycin, Cytoxan and Avastin x4 cycles followed by Taxol and Avastin x4 cycles.   2.  03/2007:  Completed 1 year Avastin.   3.  11/2006-01/2007:  Radiotherapy to the right breast of 5040 cGy.   4.  03/[id number]:  Aromasin.  Stopped after moving to the [facility].   5.  01/2016:  Right modified radical mastectomy with latissimus dorsi flap reconstruction and a left prophylactic mastectomy with latissimus dorsi flap reconstruction.     She recently had MRI of the brain as a follow-up for multiple sclerosis and there were multiple calvarial lesions noted which was suspicious for metastatic disease.  There was no evidence of new multiple sclerosis lesions.  She had a PET scan on 8/30/2019 which showed widespread bone metastatic lesions (calvarium, spine, sacrum, pelvis, ribs most prominent at C7, T3, L1 and L4), hypermetabolic 3 cm lesion in LLL, and mediastinal/hilar LN. CEA wnl at 1.9 and C27-29 elevated to 415 (28 on 8/23/16). A CT guided biopsy of the right iliac bone was obtained on 9/4/19, pathology consistent with metastatic adenocarcinoma (breast), ER/MT negative, HER-2 negative PDL 1 < 1%. A second biopsy was take of the LLL nodule via EBUS on 9/5/19 did not show any malignancy.    C/T/L spine MRI 8/3/19 to 9/2/19 with numerous enhancing lesions of the C, T and L spine, largest around T9 and L1. No evidence of  cord compression. Has a L1 compression fracture and impending L4.     Received radiation T12-L5 3000 cGy in 10 fractions from 9/6/2019 till 9/18/2019.  Neurosurgery recommended no surgical intervention but to wear Gorge brace when out of bed and HOB >30 degrees    She started palliative Xeloda 2000/1500 on 10/1/2019 but after just a few doses she noticed nausea, red eyes blurred vision, loss of appetite.  She stopped taking it after 5 doses and was seen by nurse practitioner on 10/7/2019 when she was improving.  At that point she was restarted on Xeloda at a lower dose of 1000 mg twice a day.  As she was not able to tolerate even the lower dose with extreme nausea and vomiting and feeling extremely fatigued and blurry vision, we decided on stopping Xeloda.    We decided on repeating scans and MRI brain on 10/25/2019 showed no intracranial metastatic disease.   Multiple enhancing calvarial lesions may be increased in number and are suggestive of metastatic disease. Thinner imaging on the current study may identify the smaller lesions and may be responsible for  identified more lesions.   There is a single focus of T2 hyperintense signal within the anterior right temporal lobe may represent interval demyelination. There is no evidence for active demyelination.    PET/CT on 11/1/2019 showed favorable response to therapy and Overall FDG uptake within scattered osseous metastases is decreased since 8/30/2019, particularly within the pelvis. Increased sclerotic appearance of L1 and L4 pathologic compression deformities, likely sequela of recently completed palliative radiation therapy.    No significant FDG uptake in previously demonstrated hypermetabolic mediastinal lymph nodes. Biopsy negative on 9/5/2019.  There is also decreased FDG uptake in the left lower lobe (biopsy negative for  malignancy), now with dense consolidation containing air bronchograms suggestive of infection versus aspiration.  There is diffuse FDG  uptake within the esophagus, consistent with esophagitis.    Because of intolerance to Xeloda we decided on switching to weekly paclitaxel on 11/5/2019.    C#2 Taxol 12/4/19- started with 70mg/m2 dose reduction    C#3 Taxol 12/30/2019     PET/CT on 1/24/2020 showed progression of the disease in some of the bone lesions including increase in size and lytic lesion within the vertebral body of C4 measuring 1 cm as opposed to 0.6 cm previously and a new central soft tissue nodule with SUV max 4.1 when previously it was non-hypermetabolic.  There is also some progression noted in the right proximal femur and right iliac bone.  There is decreased metabolic uptake in the right lamina of T9 with SUV max 4.7 when previously it was 8.0.  Other lesions are stable.    CA 27-29 also had increased significantly and it was 257 on 1/28/2020.    We decided on switching to eribulin on 1/28/2020.    C#2 2/18/2020  C#2 D#8 2/25/2020    C#3 D#1 3/18/2020 -delayed by 1 week as per patient's preference because she wanted to visit her family.    She had a repeat PET/CT on 3/27/2020 which showed stable size of the bone metastatic lesions although the FDG avidity was less, consistent with treatment response.    She had MRI of the thoracic spine on 4/3/2020 and it was compared to the one which was done in August 2019 and it showed increased size of several metastatic lesions most notably at T9 but also at T5-T7.  Other lesions at T10, T11 and T12 appeared improved.    CA 27-29 was also down to 222 on 3/18/2020.    Cycle #4 eribulin ( dose reduced to 1.2 mg/m2 )-4/7/2020-   Cycle #5 Eribulin ( 1.2 mg/m2 )- 4/28/2020   Cycle #6 Eribulin ( 1.2 mg/m2 )- 5/19/2020     Cycle #7 Eribulin ( 1.2 mg/m2 )- 6/9/2020    Cycle #8 Eribulin ( 1.2 mg/m2 )- 6/30/2020     She had a repeat PET scan on 7/17/2020 which showed incidental finding of new acute bilateral pulmonary embolism in the distal left main pulmonary artery extending in the left upper and lower  lobes and in the segmental and branch arteries in the right lower lobe.    It also showed mildly increased FDG avidity in the lytic lesion in the T9 lamina and right pedicle with SUV max 5.3, previously 3.9.  That is also slightly increased FDG uptake to the left anterior fifth rib at the costochondral junction SUV max 3.9, previously 2.5.  There is slightly decreased FDG uptake in the right femoral neck lesion SUV max 2.2, previously 2.9.  FDG uptake in other bone lesions is fairly stable.  No new metastasis are seen.    CA 27-29 was 308 on 6/30/2020.  It was 286 on 5/19/2020.    Overall this is consistent with only slight progression versus stable disease.    She was started on Lovenox.    Cycle #9 eribulin 7/21/2020. CA 27-29 was 238    C#10 eribulin 8/18/2020. ( delayed by one week for ANC 0.9 )    C#11 Eribulin 9/8/2020    C#12 Eribulin 9/29/2020     C#13 Eribulin 10/20/2020     PET scan on 11/6/2020 shows progression of the disease with increased FDG uptake in the lytic lesions in the T9/T10 and right posterolateral elements as well as increased FDG uptake in the previously known hypermetabolic right iliac bone lesion suggesting recurrence of previously treated metastasis.  There is new subtle lesion in the right manubrium.  There is also a new fracture in the anterolateral left fourth rib with associated uptake and this could be a pathologic fracture.  Healing fracture in the left anterior fifth rib lesion.  There is resolution of the left pulmonary emboli.  Decreased FDG uptake of the esophagus consistent with improving inflammation.    CA 27-29 on 10/20/2020 was also elevated at 489.    C#1 Trodelvy on 11/11/2020.  Cycle #2 Trodelvy 12/2/2020  Cycle number 2-day #8 Trodelvy 12/8/2020.    12/15/2020-12/16/2020.  She was admitted to the hospital with nausea vomiting and dehydration.  She had tachycardia and initial lactic acid of 5.  It decreased to 1.4 after 2 L of normal saline.  She also had hypokalemia  and ANC was 1.3.  She was treated with IV fluids.  Procalcitonin was negative.  CTA of the chest was negative for pulmonary embolism or pneumonia.  No bacterial infection was documented.  Her medication which she was initially unable to tolerate at home, were restarted and she was discharged home once started to feel better.      We delayed the start of cycle number 3 x 1 week and decrease the dose of chemotherapy by 25%.  She also had neutropenia with ANC of 1.0.      She started cycle number 3-day #1 on 12/29/2020.  CA 27-29 was 392.    Cycle number 3-day #8 1/5/2021.    C#4 Trodelvy 1/20/2021- 75% dose    2/5/2021.  PET/CT overall shows a good response to treatment with improvement of previously hypermetabolic lesions in the spine and pelvis and stability of other bone meta stasis.  There is a single lytic lesion in the right iliac bone which demonstrates slight Yimi increased FDG uptake and has SUV max 4.7 while previously it was SUV max 3.4.  There was diffuse wall thickening of the esophagus with uptake in the esophagus in the fundus of the stomach and this could be seen with inflammation.    Trodelvy cycle #5 - 2/10/2021.  75% of the dose.  CA 27-29 was 337.    Trodelvy cycle #6 - 3/3/2021.  75% of the dose.  Trodelvy cycle #6, D#8 - 3/10/2021.  75% of the dose.    Trodelvy C#8, D#8 on 4/21/2021  CA 27-29 stable at 371 on 4/14/2021    Trodelvy, cycle #9- 5/5/2021        On 2/25/2020 when she had biopsy of the right acetabulum and it was consistent with metastatic lobular carcinoma.  Again it was Triple Negative.     On 3/18/2020 when she also met with Dr. Arelis Arshad who also advised testing for androgen receptor studies on the biopsy specimen.  Tumor was diffusely androgen receptor positive.      5/26/2021-cycle #10 Trodelvy started    CA 27-29 was 545.    6/11/2021.  PET/CT shows mildly increased uptake in several bone lesions for example in the left anterior iliac crest, mid right iliac crest and left  ischium.  Uptake in other bone lesions are similar to previously.    Because of minimal progression of the disease and she is tolerating chemotherapy very well, we decided on continuing the same chemotherapy.    6/16/2021-Trodelvy cycle #11    7/7/2021 -Trodelvy cycle #12    9/14/2021-Trodelvy-cycle #15, day #8.    9/8/2021-CA 27-29 was more elevated and it was 1176.    PET/CT on 9/24/2021 showed stable findings with no evidence of FDG avid metastatic disease within the body.  Left axillary lymph nodes are prominent and hypermetabolic and likely from recent vaccinations in the left deltoid muscle.  Healed bony metastasis seems stable.      Cycle #16, Trodelvy 9/28/2021.  Cycle #17, day #8 Trodelvy was on 10/26/2021.  Cycle #18, day #8 Trodelvy on 11/22/2021       She saw Dr. Mejia whom on 10/6/2021.  She was not considered eligible for Enfortumab trial.    Cycle #19, Trodelvy on 12/7/2021 12/21/2021.  PET/CT showed progression of bony metastatic disease.  There is increase hypermetabolism in both the right and left iliac bones and the sternum.  Other bone lesions are stable.    There is new scattered areas of groundglass throughout both the lungs and these findings are indeterminate but may represent an infectious etiology.  Interval resolution of left axillary FDG avid lymphadenopathy.    She was started on Casodex 150 mg daily on 1/6/2022.    3/31/2022-PET/CT shows progressive bone meta stasis with progressive FDG uptake in the following lesions. Left iliac crest lesion with max SUV of 7.2, previously 3.5. Right mid iliac crest lytic lesion shows maximum SUV of 7.8, previously 6.9.  T10 vertebral body mixed lytic and sclerotic lesion with an SUV of 7.1, previously not hypermetabolic. Thoracic vertebral bodies 8, 7, 5, and 4 also show hypermetabolic lesions were previously there was no hypermetabolism.  Some of the sclerotic osseous lesions are unchanged and do not show increased hypermetabolism compatible  treated lesion such as a severe compression deformity at L1 as well as superior compression deformity at L4.  Medial right clavicle sclerotic lesion with SUV max of 4.8, previously not hypermetabolic. There is also right-sided sternal lesions.    Plan to switch to single agent Doxil 50 mg per metered squared every 4 weeks.        Interval history.  This is a video visit.    She was admitted to the hospital from 3/17/2022-3/19/2022 for vomiting and diarrhea and  severe back pain.  I reviewed the discharge summary.  CT lumbar spine showed a stable severe chronic L1 pathologic compression fracture.  Stable mild compression deformity of superior endplate of L3 and superior endplate of L4.  The upper margin of the L4 fracture extends slightly into the spinal canal without high-grade canal stenosis and this is also stable.  Nondisplaced fractures coursing through the L3 pedicles bilaterally were seen which were not clearly seen previously this could be acute and likely pathologic.  No extraspinal soft tissue abnormality.  Neurosurgery recommended conservative management.  Her pain was controlled and she was discharged home.    Now feeling better.  Pain is well controlled with morphine sulfate 45 mg at night and buprenorphine patch.  The nausea vomiting and diarrhea has resolved.  No new pain.  No new swellings.  Neuropathy is at baseline.  Currently feeling back to her baseline.  No fevers.  No shortness of breath.  No bleeding.    ECOG 1    ROS:  Rest of the comprehensive review of the system was negative.        I reviewed other history in epic as below.       PAST MEDICAL HISTORY:     1.  Breast cancer  2.  Multiple sclerosis.   3.  Depression.   4.  Migraines.   5.  Hypertension.   6.  Cholecystectomy and umbilical hernia repair.     SOCIAL HISTORY: She smoked for 5 years but quit many years ago.  Drinks alcohol socially.  She lives with her .  She is a teacher in middle school.       FAMILY HISTORY: She  mentions that her mother had breast cancer at 49.  Both grandmothers had breast cancer but she is not sure of the age.  A couple of cousins have cancers.  Patient has 3 children who are healthy.    Current Outpatient Medications   Medication     acetaminophen (TYLENOL) 500 MG tablet     albuterol (PROAIR HFA/PROVENTIL HFA/VENTOLIN HFA) 108 (90 Base) MCG/ACT inhaler     bicalutamide (CASODEX) 50 MG tablet     buprenorphine (BUTRANS) 5 MCG/HR WK patch     busPIRone (BUSPAR) 15 MG tablet     calcium citrate-vitamin D (CITRACAL) 315-250 MG-UNIT TABS     Cholecalciferol (VITAMIN D3 PO)     ELIQUIS ANTICOAGULANT 5 MG tablet     HEMP OIL OR EXTRACT OR OTHER CBD CANNABINOID, NOT MEDICAL CANNABIS,     ibuprofen (ADVIL/MOTRIN) 600 MG tablet     Lidocaine (LIDOCARE) 4 % Patch     loratadine (CLARITIN) 10 MG tablet     LORazepam (ATIVAN) 1 MG tablet     magnesium 250 MG tablet     morphine (MSIR) 15 MG IR tablet     Nerve Stimulator (NERIVIO) CRISTOPHER     potassium chloride ER (KLOR-CON M) 10 MEQ CR tablet     promethazine (PHENERGAN) 25 MG tablet     propranolol ER (INDERAL LA) 80 MG 24 hr capsule     senna-docusate (SENOKOT-S/PERICOLACE) 8.6-50 MG tablet     traZODone (DESYREL) 50 MG tablet     venlafaxine (EFFEXOR-XR) 75 MG 24 hr capsule     No current facility-administered medications for this visit.        Allergies   Allergen Reactions     Bactrim [Sulfamethoxazole W/Trimethoprim]      Internal bleeding     Copaxone [Glatiramer] Hives          PHYSICAL EXAMINATION:     There were no vitals taken for this visit.  Wt Readings from Last 4 Encounters:   03/28/22 64.6 kg (142 lb 6.4 oz)   03/18/22 63.2 kg (139 lb 4.8 oz)   12/14/21 64.6 kg (142 lb 6.4 oz)   12/07/21 65.2 kg (143 lb 12.8 oz)           Constitutional.  Does not seem to be in any acute distress.  Eyes.  No redness or discharge noted.  Respiratory.  Speaking in full sentences.  Breathing seems comfortable without any accessory use of muscles.    Skin.  Visualized his  skin does not show any obvious rashes.  Musculoskeletal.  Range of motion for visualized areas is intact.  Neurological.  Alert and oriented x3.  Psychiatric.  Mood, mentation and affect are normal.  Decision making capacity is intact.      The rest of a comprehensive physical examination is deferred due to [facility] video visit restrictions.      Labs and Imaging.    Reviewed.  3/19/2022.  CBC shows hemoglobin 10.5.  Otherwise unremarkable.    Chemistry shows potassium 3.1.  Stable creatinine.  AST 52.    CA 27-29 was 3146 on 3/1/2022.  This continues to increase.    9/28/2021.      Iron studies, ferritin is 101, iron 51, TIBC 268 and iron saturation index 19%.        3/31/2022-PET/CT shows progressive bone metastasis with progressive FDG uptake in the following lesions. Left iliac crest lesion with max SUV of 7.2, previously 3.5. Right mid iliac crest lytic lesion shows maximum SUV of 7.8, previously 6.9.  T10 vertebral body mixed lytic and sclerotic lesion with an SUV of 7.1, previously not hypermetabolic. Thoracic vertebral bodies 8, 7, 5, and 4 also show hypermetabolic lesions were previously there was no hypermetabolism.  Some of the sclerotic osseous lesions are unchanged and do not show increased hypermetabolism compatible treated lesion such as a severe compression deformity at L1 as well as superior compression deformity at L4.  Medial right clavicle sclerotic lesion with SUV max of 4.8, previously not hypermetabolic. There is also right-sided sternal lesions.      Biopsy from the right acetabulum shows metastatic lobular carcinoma.  It is triple negative.  Androgen receptor was diffusely positive (90%, moderate intensity) by immunohistochemistry. )  PD-L1 negative.    Foundation one showed CDH1 mutation (initially lobular cancer), CCND1 amplification ( equivocal ). FGF3, FGF4, FGF19 amplification ( Equivocal ). Most promising is CCND1 amplification with abemaciclib showing response in 4/10  patients. FGF3,FGF4 and FGF19 are targetable with pan-FGFR inhibitors.           ASSESSMENT AND PLAN:     Metastatic breast cancer negative breast cancer (androgen receptor positive ) with extensive involvement of the bones.  There is also a left lower lobe hypermetabolic lesion and mediastinal lymph nodes which are hypermetabolic.  The biopsy from the right iliac bone is consistent with metastatic lobular breast cancer but it is hormone receptor and HER-2 negative and PDL 1 is also negative.    Foundation 1 testing did not reveal any actionable mutations.    She was intolerant to Xeloda and progressed on Taxol and eribulin.      We then switched to recently FDA approved sacituzumab govitecan-hziy (Trodelvy) for TNBC, based on the results of the phase II IMMU-132-01 clinical trial.   She started this on 11/11/2020.      She obtained a second opinion from Dr. Castillo from [facility] who recommended a repeat biopsy to be done to make sure that she really has triple negative breast cancer.      She also met with Dr. Arelis Arshad on 3/18/2021.      After 10 cycles of Trodelvy, she had PET/CT on 6/11/2021 which showed mildly increased uptake in some of the bone lesions while stability in other bone lesions.        After 15 cycles, repeat PET scan showed stable findings.  CA 27-29 has been increasing and it is 1176.      As she was tolerating the chemotherapy well and her quality of life has been decent and scans were stable although she had a rising CA 27-29, we decided on continuing the same chemotherapy because it has been a very slow progression of the disease.    Now there is clear progression of the disease in the bones.  There is increased FDG uptake in both right and left iliac bones and the sternum.    Foundation one showed CDH1 mutation (initially lobular cancer), CCND1 amplification ( equivocal ). FGF3, FGF4, FGF19 amplification ( Equivocal ). Most promising is CCND1 amplification with abemaciclib  showing response in 4/10 patients. FGF3,FGF4 and FGF19 are targetable with pan-FGFR inhibitor    As the tumor is diffusely positive for androgen receptor, we we decided to start her on androgen receptor blockers.    I initially recommended enzalutamide 160 mg daily ( Enzalutamide for the Treatment of Androgen Receptor-Expressing Triple-Negative Breast Cancer----J Clin Oncol. 2018 Mar 20; 36(9): 884-890. ).    Eventually we decided to start her on Casodex 150 mg daily instead of Enzalutamide due to cost issues.  Clinical Trial Clin Cancer Res. 2013 Oct 1;19(19):5505-12.   Phase II trial of bicalutamide in patients with androgen receptor-positive, estrogen receptor-negative metastatic Breast Cancer  She started Casodex on 1/6/2022.    She had progressive disease in the bones on the PET scan on 3/31/2022.    We discussed the situation in detail.  Change in therapy.  Changing to Doxil every 4 weeks.     We discussed the rational schedule and potential side effects of it in detail.    We will check baseline echocardiogram.    Bone disease.  She has metastatic disease to the bone.  Previously she got palliative radiation to T12-L5 3000 cGy in 10 fractions from 9/6/2019 till 9/18/2019.  She was evaluated by orthopedics Dr. Bipin Elliott on 11/1/2019 and conservative management was recommended.    She was on Zometa every 3 months. She last received on 9/29/2020.  Because of progression of the disease in the bones, we switched to Xgeva which she received on 11/11/2020.    She continues to show progression in the bones.  I believe it would be reasonable to continue with Xgeva and continue calcium and vitamin D.   She last received Xgeva on 3/28/2022.    Anemia.  Related to metastatic cancer and previous chemotherapy.  Continue to monitor.       Pain management. Now better controlled on buprenorphine patch and she takes morphine sulfate 45 mg at night.  Following with palliative care.      Bilateral pulmonary emboli.   Tolerating Eliquis for incidentally found PE. Cont the same        We did not address the following today.    Constipation. Continue senna.    Neuropathy.  This remains mild and stable with neuropathy in her hands.    This is in addition to the chronic balance issues and heat and cold sensitivity from underlying multiple sclerosis which is also stable for years.  Continue to monitor.     Subcutaneous nodule in the scalp.  This looks like an epidermoid cyst.  She thinks it is a stable.  Continue to monitor.       Insomnia.  Continue trazodone.      Wall thickening of esophagus and FDG uptake of fundus of the stomach.  It could be in the setting of inflammation from recent nausea and vomiting.  Symptoms have resolved.  Continue to monitor clinically.    Vision changes.    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  It was thought that this could be due to Taxol as patient reported to the ophthalmologist that she was on Taxol in September 2019 when her symptoms started.  But she was not on Taxol in September 2019 when she started noticing the visual changes so Taxol is not responsible for this.  The first dose of Taxol was on 11/5/2019.   She had left cataract extraction on 2/8/2021 and she has noticed significant improvement in her vision.       Increased FDG ability in the colon.  She had FDG uptake in the cecum and ascending colon but no corresponding lesion was seen on the CT scan.  She is completely asymptomatic so at this time we will continue to observe.    Discussion regarding healthcare directives.  On previous visit she told me that she will bring the health care directive for our records but she forgot so I told her to bring it on next visit.      Breast implant removal.  She tells me that she was planning to do breast implant removal because there was a recall on this.  Her surgery was scheduled for 9/24/2019.  Because of this new development of metastatic breast cancer  and her recent radiation, surgery has been canceled.  We discussed that at this time treatment for metastatic breast cancer would take precedence over the other surgery as the other surgery would require her to be off chemotherapy for several weeks and in that case the chances of cancer progression would be high and I would not recommend that.    Multiple sclerosis.  She will continue to follow with her neurologist Dr. Quiles.  Currently multiple sclerosis is under control and currently she is not taking any medications.    See me back before cycle #2      All of her questions were [id number] to her [email or web address]  She is agreeable and comfortable with the plan.    Dewayne Zepeda MD

## 2022-04-05 NOTE — LETTER
4/5/2022         RE: Shaneka Alvarez  [id number] [facility] [id number]        Dear Colleague,    Thank you for referring your patient, Shaneka Alvarez, to the [facility]. Please see a copy of my visit note below.    ONCOLOGY FOLLOW UP NOTE: 4/05/22        I took the history from reviewing the previous notes that she was following with Dr. Amaya.  I have copied and updated from prior notes.    ONCOLOGY History:    1.  January 2006:  Diagnosed with Stage IIB, T2 N1 M0 invasive lobular carcinoma of the right breast.  Final pathology showed a 4.5 x 3.8 x 2.5 cm, 01/14 lymph nodes positive.  Estrogen, progesterone receptor positive, HER-2 negative.     2.  Genetics.  BRCA1 and 2 mutations not detected. 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       THERAPY TO DATE:   1. 2006:  ECOG 2104 protocol of dose-dense Adriamycin, Cytoxan and Avastin x4 cycles followed by Taxol and Avastin x4 cycles.   2.  03/2007:  Completed 1 year Avastin.   3.  11/2006-01/2007:  Radiotherapy to the right breast of 5040 cGy.   4.  03/[id number]:  Aromasin.  Stopped after moving to the [facility].   5.  01/2016:  Right modified radical mastectomy with latissimus dorsi flap reconstruction and a left prophylactic mastectomy with latissimus dorsi flap reconstruction.     She recently had MRI of the brain as a follow-up for multiple sclerosis and there were multiple calvarial lesions noted which was suspicious for metastatic disease.  There was no evidence of new multiple sclerosis lesions.  She had a PET scan on 8/30/2019 which showed widespread bone metastatic lesions (calvarium, spine, sacrum, pelvis, ribs most prominent at C7, T3, L1 and L4), hypermetabolic 3 cm lesion in LLL, and mediastinal/hilar LN. CEA wnl at 1.9 and C27-29 elevated to 415 (28 on 8/23/16). A CT guided biopsy of the right iliac bone was obtained on 9/4/19, pathology consistent with metastatic adenocarcinoma (breast), ER/KS  negative, HER-2 negative PDL 1 < 1%. A second biopsy was take of the LLL nodule via EBUS on 9/5/19 did not show any malignancy.    C/T/L spine MRI 8/3/19 to 9/2/19 with numerous enhancing lesions of the C, T and L spine, largest around T9 and L1. No evidence of cord compression. Has a L1 compression fracture and impending L4.     Received radiation T12-L5 3000 cGy in 10 fractions from 9/6/2019 till 9/18/2019.  Neurosurgery recommended no surgical intervention but to wear [place] brace when out of bed and HOB >30 degrees    She started palliative Xeloda 2000/1500 on 10/1/2019 but after just a few doses she noticed nausea, red eyes blurred vision, loss of appetite.  She stopped taking it after 5 doses and was seen by nurse practitioner on 10/7/2019 when she was improving.  At that point she was restarted on Xeloda at a lower dose of 1000 mg twice a day.  As she was not able to tolerate even the lower dose with extreme nausea and vomiting and feeling extremely fatigued and blurry vision, we decided on stopping Xeloda.    We decided on repeating scans and MRI brain on 10/25/2019 showed no intracranial metastatic disease.   Multiple enhancing calvarial lesions may be increased in number and are suggestive of metastatic disease. Thinner imaging on the current study may identify the smaller lesions and may be responsible for  identified more lesions.   There is a single focus of T2 hyperintense signal within the anterior right temporal lobe may represent interval demyelination. There is no evidence for active demyelination.    PET/CT on 11/1/2019 showed favorable response to therapy and Overall FDG uptake within scattered osseous metastases is decreased since 8/30/2019, particularly within the pelvis. Increased sclerotic appearance of L1 and L4 pathologic compression deformities, likely sequela of recently completed palliative radiation therapy.    No significant FDG uptake in previously demonstrated hypermetabolic  mediastinal lymph nodes. Biopsy negative on 9/5/2019.  There is also decreased FDG uptake in the left lower lobe (biopsy negative for  malignancy), now with dense consolidation containing air bronchograms suggestive of infection versus aspiration.  There is diffuse FDG uptake within the esophagus, consistent with esophagitis.    Because of intolerance to Xeloda we decided on switching to weekly paclitaxel on 11/5/2019.    C#2 Taxol 12/4/19- started with 70mg/m2 dose reduction    C#3 Taxol 12/30/2019     PET/CT on 1/24/2020 showed progression of the disease in some of the bone lesions including increase in size and lytic lesion within the vertebral body of C4 measuring 1 cm as opposed to 0.6 cm previously and a new central soft tissue nodule with SUV max 4.1 when previously it was non-hypermetabolic.  There is also some progression noted in the right proximal femur and right iliac bone.  There is decreased metabolic uptake in the right lamina of T9 with SUV max 4.7 when previously it was 8.0.  Other lesions are stable.    CA 27-29 also had increased significantly and it was 257 on 1/28/2020.    We decided on switching to eribulin on 1/28/2020.    C#2 2/18/2020  C#2 D#8 2/25/2020    C#3 D#1 3/18/2020 -delayed by 1 week as per patient's preference because she wanted to visit her family.    She had a repeat PET/CT on 3/27/2020 which showed stable size of the bone metastatic lesions although the FDG avidity was less, consistent with treatment response.    She had MRI of the thoracic spine on 4/3/2020 and it was compared to the one which was done in August 2019 and it showed increased size of several metastatic lesions most notably at T9 but also at T5-T7.  Other lesions at T10, T11 and T12 appeared improved.    CA 27-29 was also down to 222 on 3/18/2020.    Cycle #4 eribulin ( dose reduced to 1.2 mg/m2 )-4/7/2020-   Cycle #5 Eribulin ( 1.2 mg/m2 )- 4/28/2020   Cycle #6 Eribulin ( 1.2 mg/m2 )- 5/19/2020     Cycle #7  Eribulin ( 1.2 mg/m2 )- 6/9/2020    Cycle #8 Eribulin ( 1.2 mg/m2 )- 6/30/2020     She had a repeat PET scan on 7/17/2020 which showed incidental finding of new acute bilateral pulmonary embolism in the distal left main pulmonary artery extending in the left upper and lower lobes and in the segmental and branch arteries in the right lower lobe.    It also showed mildly increased FDG avidity in the lytic lesion in the T9 lamina and right pedicle with SUV max 5.3, previously 3.9.  That is also slightly increased FDG uptake to the left anterior fifth rib at the costochondral junction SUV max 3.9, previously 2.5.  There is slightly decreased FDG uptake in the right femoral neck lesion SUV max 2.2, previously 2.9.  FDG uptake in other bone lesions is fairly stable.  No new metastasis are seen.    CA 27-29 was 308 on 6/30/2020.  It was 286 on 5/19/2020.    Overall this is consistent with only slight progression versus stable disease.    She was started on Lovenox.    Cycle #9 eribulin 7/21/2020. CA 27-29 was 238    C#10 eribulin 8/18/2020. ( delayed by one week for ANC 0.9 )    C#11 Eribulin 9/8/2020    C#12 Eribulin 9/29/2020     C#13 Eribulin 10/20/2020     PET scan on 11/6/2020 shows progression of the disease with increased FDG uptake in the lytic lesions in the T9/T10 and right posterolateral elements as well as increased FDG uptake in the previously known hypermetabolic right iliac bone lesion suggesting recurrence of previously treated metastasis.  There is new subtle lesion in the right manubrium.  There is also a new fracture in the anterolateral left fourth rib with associated uptake and this could be a pathologic fracture.  Healing fracture in the left anterior fifth rib lesion.  There is resolution of the left pulmonary emboli.  Decreased FDG uptake of the esophagus consistent with improving inflammation.    CA 27-29 on 10/20/2020 was also elevated at 489.    C#1 Trodelvy on 11/11/2020.  Cycle #2 Trodelvy  12/2/2020  Cycle number 2-day #8 Trodelvy 12/8/2020.    12/15/2020-12/16/2020.  She was admitted to the hospital with nausea vomiting and dehydration.  She had tachycardia and initial lactic acid of 5.  It decreased to 1.4 after 2 L of normal saline.  She also had hypokalemia and ANC was 1.3.  She was treated with IV fluids.  Procalcitonin was negative.  CTA of the chest was negative for pulmonary embolism or pneumonia.  No bacterial infection was documented.  Her medication which she was initially unable to tolerate at home, were restarted and she was discharged home once started to feel better.      We delayed the start of cycle number 3 x 1 week and decrease the dose of chemotherapy by 25%.  She also had neutropenia with ANC of 1.0.      She started cycle number 3-day #1 on 12/29/2020.  CA 27-29 was 392.    Cycle number 3-day #8 1/5/2021.    C#4 Trodelvy 1/20/2021- 75% dose    2/5/2021.  PET/CT overall shows a good response to treatment with improvement of previously hypermetabolic lesions in the spine and pelvis and stability of other bone meta stasis.  There is a single lytic lesion in the right iliac bone which demonstrates slight Yimi increased FDG uptake and has SUV max 4.7 while previously it was SUV max 3.4.  There was diffuse wall thickening of the esophagus with uptake in the esophagus in the fundus of the stomach and this could be seen with inflammation.    Trodelvy cycle #5 - 2/10/2021.  75% of the dose.  CA 27-29 was 337.    Trodelvy cycle #6 - 3/3/2021.  75% of the dose.  Trodelvy cycle #6, D#8 - 3/10/2021.  75% of the dose.    Trodelvy C#8, D#8 on 4/21/2021  CA 27-29 stable at 371 on 4/14/2021    Trodelvy, cycle #9- 5/5/2021        On 2/25/2020 when she had biopsy of the right acetabulum and it was consistent with metastatic lobular carcinoma.  Again it was Triple Negative.     On 3/18/2020 when she also met with Dr. Arelis Arshad who also advised testing for androgen receptor studies on the biopsy  specimen.  Tumor was diffusely androgen receptor positive.      5/26/2021-cycle #10 Trodelvy started    CA 27-29 was 545.    6/11/2021.  PET/CT shows mildly increased uptake in several bone lesions for example in the left anterior iliac crest, mid right iliac crest and left ischium.  Uptake in other bone lesions are similar to previously.    Because of minimal progression of the disease and she is tolerating chemotherapy very well, we decided on continuing the same chemotherapy.    6/16/2021-Trodelvy cycle #11    7/7/2021 -Trodelvy cycle #12    9/14/2021-Trodelvy-cycle #15, day #8.    9/8/2021-CA 27-29 was more elevated and it was 1176.    PET/CT on 9/24/2021 showed stable findings with no evidence of FDG avid metastatic disease within the body.  Left axillary lymph nodes are prominent and hypermetabolic and likely from recent vaccinations in the left deltoid muscle.  Healed bony metastasis seems stable.      Cycle #16, Trodelvy 9/28/2021.  Cycle #17, day #8 Trodelvy was on 10/26/2021.  Cycle #18, day #8 Trodelvy on 11/22/2021       She saw Dr. Mejia whom on 10/6/2021.  She was not considered eligible for Enfortumab trial.    Cycle #19, Trodelvy on 12/7/2021 12/21/2021.  PET/CT showed progression of bony metastatic disease.  There is increase hypermetabolism in both the right and left iliac bones and the sternum.  Other bone lesions are stable.    There is new scattered areas of groundglass throughout both the lungs and these findings are indeterminate but may represent an infectious etiology.  Interval resolution of left axillary FDG avid lymphadenopathy.    She was started on Casodex 150 mg daily on 1/6/2022.    3/31/2022-PET/CT shows progressive bone meta stasis with progressive FDG uptake in the following lesions. Left iliac crest lesion with max SUV of 7.2, previously 3.5. Right mid iliac crest lytic lesion shows maximum SUV of 7.8, previously 6.9.  T10 vertebral body mixed lytic and sclerotic lesion with  an SUV of 7.1, previously not hypermetabolic. Thoracic vertebral bodies 8, 7, 5, and 4 also show hypermetabolic lesions were previously there was no hypermetabolism.  Some of the sclerotic osseous lesions are unchanged and do not show increased hypermetabolism compatible treated lesion such as a severe compression deformity at L1 as well as superior compression deformity at L4.  Medial right clavicle sclerotic lesion with SUV max of 4.8, previously not hypermetabolic. There is also right-sided sternal lesions.    Plan to switch to single agent Doxil 50 mg per metered squared every 4 weeks.        Interval history.  This is a video visit.    She was admitted to the hospital from 3/17/2022-3/19/2022 for vomiting and diarrhea and  severe back pain.  I reviewed the discharge summary.  CT lumbar spine showed a stable severe chronic L1 pathologic compression fracture.  Stable mild compression deformity of superior endplate of L3 and superior endplate of L4.  The upper margin of the L4 fracture extends slightly into the spinal canal without high-grade canal stenosis and this is also stable.  Nondisplaced fractures coursing through the L3 pedicles bilaterally were seen which were not clearly seen previously this could be acute and likely pathologic.  No extraspinal soft tissue abnormality.  Neurosurgery recommended conservative management.  Her pain was controlled and she was discharged home.    Now feeling better.  Pain is well controlled with morphine sulfate 45 mg at night and buprenorphine patch.  The nausea vomiting and diarrhea has resolved.  No new pain.  No new swellings.  Neuropathy is at baseline.  Currently feeling back to her baseline.  No fevers.  No shortness of breath.  No bleeding.    ECOG 1    ROS:  Rest of the comprehensive review of the system was negative.        I reviewed other history in epic as below.       PAST MEDICAL HISTORY:     1.  Breast cancer  2.  Multiple sclerosis.   3.  Depression.   4.   Migraines.   5.  Hypertension.   6.  Cholecystectomy and umbilical hernia repair.     SOCIAL HISTORY: She smoked for 5 years but quit many years ago.  Drinks alcohol socially.  She lives with her .  She is a teacher in middle school.       FAMILY HISTORY: She mentions that her mother had breast cancer at 49.  Both grandmothers had breast cancer but she is not sure of the age.  A couple of cousins have cancers.  Patient has 3 children who are healthy.    Current Outpatient Medications   Medication     acetaminophen (TYLENOL) 500 MG tablet     albuterol (PROAIR HFA/PROVENTIL HFA/VENTOLIN HFA) 108 (90 Base) MCG/ACT inhaler     bicalutamide (CASODEX) 50 MG tablet     buprenorphine (BUTRANS) 5 MCG/HR WK patch     busPIRone (BUSPAR) 15 MG tablet     calcium citrate-vitamin D (CITRACAL) 315-250 MG-UNIT TABS     Cholecalciferol (VITAMIN D3 PO)     ELIQUIS ANTICOAGULANT 5 MG tablet     HEMP OIL OR EXTRACT OR OTHER CBD CANNABINOID, NOT MEDICAL CANNABIS,     ibuprofen (ADVIL/MOTRIN) 600 MG tablet     Lidocaine (LIDOCARE) 4 % Patch     loratadine (CLARITIN) 10 MG tablet     LORazepam (ATIVAN) 1 MG tablet     magnesium 250 MG tablet     morphine (MSIR) 15 MG IR tablet     Nerve Stimulator (NERIVIO) CRISTOPHER     potassium chloride ER (KLOR-CON M) 10 MEQ CR tablet     promethazine (PHENERGAN) 25 MG tablet     propranolol ER (INDERAL LA) 80 MG 24 hr capsule     senna-docusate (SENOKOT-S/PERICOLACE) 8.6-50 MG tablet     traZODone (DESYREL) 50 MG tablet     venlafaxine (EFFEXOR-XR) 75 MG 24 hr capsule     No current facility-administered medications for this visit.        Allergies   Allergen Reactions     Bactrim [Sulfamethoxazole W/Trimethoprim]      Internal bleeding     Copaxone [Glatiramer] Hives          PHYSICAL EXAMINATION:     There were no vitals taken for this visit.  Wt Readings from Last 4 Encounters:   03/28/22 64.6 kg (142 lb 6.4 oz)   03/18/22 63.2 kg (139 lb 4.8 oz)   12/14/21 64.6 kg (142 lb 6.4 oz)   12/07/21  65.2 kg (143 lb 12.8 oz)           Constitutional.  Does not seem to be in any acute distress.  Eyes.  No redness or discharge noted.  Respiratory.  Speaking in full sentences.  Breathing seems comfortable without any accessory use of muscles.    Skin.  Visualized his skin does not show any obvious rashes.  Musculoskeletal.  Range of motion for visualized areas is intact.  Neurological.  Alert and oriented x3.  Psychiatric.  Mood, mentation and affect are normal.  Decision making capacity is intact.      The rest of a comprehensive physical examination is deferred due to Public Health Emergency video visit restrictions.      Labs and Imaging.    Reviewed.  3/19/2022.  CBC shows hemoglobin 10.5.  Otherwise unremarkable.    Chemistry shows potassium 3.1.  Stable creatinine.  AST 52.    CA 27-29 was 3146 on 3/1/2022.  This continues to increase.    9/28/2021.      Iron studies, ferritin is 101, iron 51, TIBC 268 and iron saturation index 19%.        3/31/2022-PET/CT shows progressive bone metastasis with progressive FDG uptake in the following lesions. Left iliac crest lesion with max SUV of 7.2, previously 3.5. Right mid iliac crest lytic lesion shows maximum SUV of 7.8, previously 6.9.  T10 vertebral body mixed lytic and sclerotic lesion with an SUV of 7.1, previously not hypermetabolic. Thoracic vertebral bodies 8, 7, 5, and 4 also show hypermetabolic lesions were previously there was no hypermetabolism.  Some of the sclerotic osseous lesions are unchanged and do not show increased hypermetabolism compatible treated lesion such as a severe compression deformity at L1 as well as superior compression deformity at L4.  Medial right clavicle sclerotic lesion with SUV max of 4.8, previously not hypermetabolic. There is also right-sided sternal lesions.      Biopsy from the right acetabulum shows metastatic lobular carcinoma.  It is triple negative.  Androgen receptor was diffusely positive (90%, moderate intensity) by  immunohistochemistry. )  PD-L1 negative.    Foundation one showed CDH1 mutation (initially lobular cancer), CCND1 amplification ( equivocal ). FGF3, FGF4, FGF19 amplification ( Equivocal ). Most promising is CCND1 amplification with abemaciclib showing response in 4/10 patients. FGF3,FGF4 and FGF19 are targetable with pan-FGFR inhibitors.           ASSESSMENT AND PLAN:     Metastatic breast cancer negative breast cancer (androgen receptor positive ) with extensive involvement of the bones.  There is also a left lower lobe hypermetabolic lesion and mediastinal lymph nodes which are hypermetabolic.  The biopsy from the right iliac bone is consistent with metastatic lobular breast cancer but it is hormone receptor and HER-2 negative and PDL 1 is also negative.    Foundation 1 testing did not reveal any actionable mutations.    She was intolerant to Xeloda and progressed on Taxol and eribulin.      We then switched to recently FDA approved sacituzumab govitecan-hziy (Trodelvy) for TNBC, based on the results of the phase II IMMU-132-01 clinical trial.   She started this on 11/11/2020.      She [facility] recommended a repeat biopsy to be done to make sure that she really has triple negative breast cancer.      She also met with Dr. Arelis Arshad on 3/18/2021.      After 10 cycles of Trodelvy, she had PET/CT on 6/11/2021 which showed mildly increased uptake in some of the bone lesions while stability in other bone lesions.        After 15 cycles, repeat PET scan showed stable findings.  CA 27-29 has been increasing and it is 1176.      As she was tolerating the chemotherapy well and her quality of life has been decent and scans were stable although she had a rising CA 27-29, we decided on continuing the same chemotherapy because it has been a very slow progression of the disease.    Now there is clear progression of the disease in the bones.  There is increased  FDG uptake in both right and left iliac bones and the sternum.    Foundation one showed CDH1 mutation (initially lobular cancer), CCND1 amplification ( equivocal ). FGF3, FGF4, FGF19 amplification ( Equivocal ). Most promising is CCND1 amplification with abemaciclib showing response in 4/10 patients. FGF3,FGF4 and FGF19 are targetable with pan-FGFR inhibitor    As the tumor is diffusely positive for androgen receptor, we we decided to start her on androgen receptor blockers.    I initially recommended enzalutamide 160 mg daily ( Enzalutamide for the Treatment of Androgen Receptor-Expressing Triple-Negative Breast Cancer----J Clin Oncol. 2018 Mar 20; 36(9): 884-890. ).    Eventually we decided to start her on Casodex 150 mg daily instead of Enzalutamide due to cost issues.  Clinical Trial Clin Cancer Res. 2013 Oct 1;19(19):5505-12.   Phase II trial of bicalutamide in patients with androgen receptor-positive, estrogen receptor-negative metastatic Breast Cancer  She started Casodex on 1/6/2022.    She had progressive disease in the bones on the PET scan on 3/31/2022.    We discussed the situation in detail.  Change in therapy.  Changing to Doxil every 4 weeks.     We discussed the rational schedule and potential side effects of it in detail.    We will check baseline echocardiogram.    Bone disease.  She has metastatic disease to the bone.  Previously she got palliative radiation to T12-L5 3000 cGy in 10 fractions from 9/6/2019 till 9/18/2019.  She was evaluated by orthopedics Dr. Bipin Elliott on 11/1/2019 and conservative management was recommended.    She was on Zometa every 3 months. She last received on 9/29/2020.  Because of progression of the disease in the bones, we switched to Xgeva which she received on 11/11/2020.    She continues to show progression in the bones.  I believe it would be reasonable to continue with Xgeva and continue calcium and vitamin D.   She last received Xgeva on  3/28/2022.    Anemia.  Related to metastatic cancer and previous chemotherapy.  Continue to monitor.       Pain management. Now better controlled on buprenorphine patch and she takes morphine sulfate 45 mg at night.  Following with palliative care.      Bilateral pulmonary emboli.  Tolerating Eliquis for incidentally found PE. Cont the same        We did not address the following today.    Constipation. Continue senna.    Neuropathy.  This remains mild and stable with neuropathy in her hands.    This is in addition to the chronic balance issues and heat and cold sensitivity from underlying multiple sclerosis which is also stable for years.  Continue to monitor.     Subcutaneous nodule in the scalp.  This looks like an epidermoid cyst.  She thinks it is a stable.  Continue to monitor.       Insomnia.  Continue trazodone.      Wall thickening of esophagus and FDG uptake of fundus of the stomach.  It could be in the setting of inflammation from recent nausea and vomiting.  Symptoms have resolved.  Continue to monitor clinically.    Vision changes.    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  It was thought that this could be due to Taxol as patient reported to the ophthalmologist that she was on Taxol in September 2019 when her symptoms started.  But she was not on Taxol in September 2019 when she started noticing the visual changes so Taxol is not responsible for this.  The first dose of Taxol was on 11/5/2019.   She had left cataract extraction on 2/8/2021 and she has noticed significant improvement in her vision.       Increased FDG ability in the colon.  She had FDG uptake in the cecum and ascending colon but no corresponding lesion was seen on the CT scan.  She is completely asymptomatic so at this time we will continue to observe.    Discussion regarding healthcare directives.  On previous visit she told me that she will bring the health care directive for our records but  she forgot so I told her to bring it on next visit.      Breast implant removal.  She tells me that she was planning to do breast implant removal because there was a recall on this.  Her surgery was scheduled for 9/24/2019.  Because of this new development of metastatic breast cancer and her recent radiation, surgery has been canceled.  We discussed that at this time treatment for metastatic breast cancer would take precedence over the other surgery as the other surgery would require her to be off chemotherapy for several weeks and in that case the chances of cancer progression would be high and I would not recommend that.    Multiple sclerosis.  She will continue to follow with her neurologist Dr. Quiles.  Currently multiple sclerosis is under control and currently she is not taking any medications.    See me back before cycle #2      All of her questions were answered to her satisfaction.  She is agreeable and comfortable with the plan.    Dewayne Zepeda MD                Shaneka is a 61 year old who is being evaluated via a billable video visit.  Currently in [facility].    How would you like to obtain your AVS? MyChart  If the video visit is dropped, the invitation should be resent by: Text to cell phone: [phone]  Will anyone else be joining your video visit? No       TATYANA Champagne    Video Start Time: 8:49 AM  Video-Visit Details    Type of service:  Video Visit    Video End Time:9:02 AM    Originating Location (pt. Location): Home    Distant Location (provider location):  [facility]     Platform used for Video Visit: Well      Again, thank you for allowing me to participate in the care of your patient.        Sincerely,        Dewayne Zepeda MD

## 2022-04-05 NOTE — PATIENT INSTRUCTIONS
Stop Casodex    Schedule ECHO   Schedule Doxil every 4 weeks    See me before C#2    Xgeva every 4 weeks as well ( try to schedule with Doxil )

## 2022-04-07 ENCOUNTER — ANCILLARY PROCEDURE (OUTPATIENT)
Dept: CARDIOLOGY | Facility: CLINIC | Age: 62
End: 2022-04-07
Attending: INTERNAL MEDICINE
Payer: MEDICARE

## 2022-04-07 DIAGNOSIS — Z79.899 ENCOUNTER FOR MONITORING CARDIOTOXIC DRUG THERAPY: ICD-10-CM

## 2022-04-07 DIAGNOSIS — Z51.81 ENCOUNTER FOR MONITORING CARDIOTOXIC DRUG THERAPY: ICD-10-CM

## 2022-04-07 LAB
BI-PLANE LVEF ECHO: NORMAL
LVEF ECHO: NORMAL

## 2022-04-07 PROCEDURE — 93306 TTE W/DOPPLER COMPLETE: CPT | Performed by: STUDENT IN AN ORGANIZED HEALTH CARE EDUCATION/TRAINING PROGRAM

## 2022-04-13 DIAGNOSIS — G47.00 INSOMNIA, UNSPECIFIED TYPE: ICD-10-CM

## 2022-04-13 RX ORDER — TRAZODONE HYDROCHLORIDE 50 MG/1
TABLET, FILM COATED ORAL
Qty: 30 TABLET | Refills: 1 | Status: SHIPPED | OUTPATIENT
Start: 2022-04-13 | End: 2022-05-26

## 2022-04-14 ENCOUNTER — INFUSION THERAPY VISIT (OUTPATIENT)
Dept: INFUSION THERAPY | Facility: CLINIC | Age: 62
End: 2022-04-14
Payer: MEDICARE

## 2022-04-14 ENCOUNTER — LAB (OUTPATIENT)
Dept: ONCOLOGY | Facility: CLINIC | Age: 62
End: 2022-04-14
Payer: MEDICARE

## 2022-04-14 VITALS
HEIGHT: 62 IN | TEMPERATURE: 98.3 F | SYSTOLIC BLOOD PRESSURE: 128 MMHG | OXYGEN SATURATION: 97 % | DIASTOLIC BLOOD PRESSURE: 93 MMHG | WEIGHT: 136.8 LBS | RESPIRATION RATE: 16 BRPM | HEART RATE: 91 BPM | BODY MASS INDEX: 25.17 KG/M2

## 2022-04-14 DIAGNOSIS — C50.912 BILATERAL MALIGNANT NEOPLASM OF BREAST IN FEMALE, UNSPECIFIED ESTROGEN RECEPTOR STATUS, UNSPECIFIED SITE OF BREAST (H): ICD-10-CM

## 2022-04-14 DIAGNOSIS — D70.1 CHEMOTHERAPY-INDUCED NEUTROPENIA (H): ICD-10-CM

## 2022-04-14 DIAGNOSIS — C50.911 BILATERAL MALIGNANT NEOPLASM OF BREAST IN FEMALE, UNSPECIFIED ESTROGEN RECEPTOR STATUS, UNSPECIFIED SITE OF BREAST (H): ICD-10-CM

## 2022-04-14 DIAGNOSIS — T45.1X5A CHEMOTHERAPY-INDUCED NEUTROPENIA (H): ICD-10-CM

## 2022-04-14 DIAGNOSIS — E87.6 HYPOKALEMIA: ICD-10-CM

## 2022-04-14 DIAGNOSIS — C50.919 METASTATIC BREAST CANCER: ICD-10-CM

## 2022-04-14 DIAGNOSIS — C50.919 METASTATIC BREAST CANCER: Primary | ICD-10-CM

## 2022-04-14 DIAGNOSIS — E87.6 HYPOKALEMIA: Primary | ICD-10-CM

## 2022-04-14 LAB
ALBUMIN SERPL-MCNC: 4 G/DL (ref 3.4–5)
ALP SERPL-CCNC: 74 U/L (ref 40–150)
ALT SERPL W P-5'-P-CCNC: 29 U/L (ref 0–50)
ANION GAP SERPL CALCULATED.3IONS-SCNC: 6 MMOL/L (ref 3–14)
AST SERPL W P-5'-P-CCNC: 32 U/L (ref 0–45)
BASOPHILS # BLD AUTO: 0.1 10E3/UL (ref 0–0.2)
BASOPHILS NFR BLD AUTO: 1 %
BILIRUB SERPL-MCNC: 0.3 MG/DL (ref 0.2–1.3)
BUN SERPL-MCNC: 9 MG/DL (ref 7–30)
CALCIUM SERPL-MCNC: 9.7 MG/DL (ref 8.5–10.1)
CANCER AG27-29 SERPL-ACNC: 5307 U/ML (ref 0–39)
CHLORIDE BLD-SCNC: 105 MMOL/L (ref 94–109)
CO2 SERPL-SCNC: 28 MMOL/L (ref 20–32)
CREAT SERPL-MCNC: 0.86 MG/DL (ref 0.52–1.04)
EOSINOPHIL # BLD AUTO: 0.2 10E3/UL (ref 0–0.7)
EOSINOPHIL NFR BLD AUTO: 4 %
ERYTHROCYTE [DISTWIDTH] IN BLOOD BY AUTOMATED COUNT: 14.6 % (ref 10–15)
GFR SERPL CREATININE-BSD FRML MDRD: 77 ML/MIN/1.73M2
GLUCOSE BLD-MCNC: 100 MG/DL (ref 70–99)
HCT VFR BLD AUTO: 41.1 % (ref 35–47)
HGB BLD-MCNC: 13.3 G/DL (ref 11.7–15.7)
IMM GRANULOCYTES # BLD: 0 10E3/UL
IMM GRANULOCYTES NFR BLD: 1 %
LYMPHOCYTES # BLD AUTO: 1.2 10E3/UL (ref 0.8–5.3)
LYMPHOCYTES NFR BLD AUTO: 21 %
MCH RBC QN AUTO: 27.1 PG (ref 26.5–33)
MCHC RBC AUTO-ENTMCNC: 32.4 G/DL (ref 31.5–36.5)
MCV RBC AUTO: 84 FL (ref 78–100)
MONOCYTES # BLD AUTO: 0.6 10E3/UL (ref 0–1.3)
MONOCYTES NFR BLD AUTO: 11 %
NEUTROPHILS # BLD AUTO: 3.4 10E3/UL (ref 1.6–8.3)
NEUTROPHILS NFR BLD AUTO: 62 %
NRBC # BLD AUTO: 0 10E3/UL
NRBC BLD AUTO-RTO: 0 /100
PLATELET # BLD AUTO: 218 10E3/UL (ref 150–450)
POTASSIUM BLD-SCNC: 4 MMOL/L (ref 3.4–5.3)
PROT SERPL-MCNC: 7.9 G/DL (ref 6.8–8.8)
RBC # BLD AUTO: 4.9 10E6/UL (ref 3.8–5.2)
SODIUM SERPL-SCNC: 139 MMOL/L (ref 133–144)
WBC # BLD AUTO: 5.4 10E3/UL (ref 4–11)

## 2022-04-14 PROCEDURE — 96413 CHEMO IV INFUSION 1 HR: CPT | Performed by: NURSE PRACTITIONER

## 2022-04-14 PROCEDURE — 85025 COMPLETE CBC W/AUTO DIFF WBC: CPT | Performed by: NURSE PRACTITIONER

## 2022-04-14 PROCEDURE — 96367 TX/PROPH/DG ADDL SEQ IV INF: CPT | Performed by: NURSE PRACTITIONER

## 2022-04-14 PROCEDURE — 80053 COMPREHEN METABOLIC PANEL: CPT | Performed by: NURSE PRACTITIONER

## 2022-04-14 PROCEDURE — 96415 CHEMO IV INFUSION ADDL HR: CPT | Performed by: NURSE PRACTITIONER

## 2022-04-14 PROCEDURE — 99207 PR NO CHARGE LOS: CPT

## 2022-04-14 PROCEDURE — 99207 PR NO CHARGE NURSE ONLY: CPT

## 2022-04-14 PROCEDURE — 86300 IMMUNOASSAY TUMOR CA 15-3: CPT | Performed by: NURSE PRACTITIONER

## 2022-04-14 RX ORDER — PROCHLORPERAZINE MALEATE 10 MG
10 TABLET ORAL EVERY 6 HOURS PRN
Qty: 30 TABLET | Refills: 2 | Status: SHIPPED | OUTPATIENT
Start: 2022-04-14 | End: 2022-11-18

## 2022-04-14 RX ORDER — HEPARIN SODIUM (PORCINE) LOCK FLUSH IV SOLN 100 UNIT/ML 100 UNIT/ML
500 SOLUTION INTRAVENOUS
Status: DISCONTINUED | OUTPATIENT
Start: 2022-04-14 | End: 2022-04-14 | Stop reason: HOSPADM

## 2022-04-14 RX ORDER — HEPARIN SODIUM (PORCINE) LOCK FLUSH IV SOLN 100 UNIT/ML 100 UNIT/ML
5 SOLUTION INTRAVENOUS
Status: DISCONTINUED | OUTPATIENT
Start: 2022-04-14 | End: 2022-04-14 | Stop reason: HOSPADM

## 2022-04-14 RX ADMIN — HEPARIN SODIUM (PORCINE) LOCK FLUSH IV SOLN 100 UNIT/ML 500 UNITS: 100 SOLUTION at 12:20

## 2022-04-14 RX ADMIN — HEPARIN SODIUM (PORCINE) LOCK FLUSH IV SOLN 100 UNIT/ML 5 ML: 100 SOLUTION at 15:04

## 2022-04-14 ASSESSMENT — PAIN SCALES - GENERAL: PAINLEVEL: MILD PAIN (2)

## 2022-04-14 NOTE — PROGRESS NOTES
Infusion Nursing Note:  Shaneka Alvarez presents today for C1D1 Doxil.    Patient seen by provider today: No   present during visit today: Not Applicable.    Note: The pt reports experiencing generalized bone pain at a 2/10. She reports feeling nervous and emotional regarding starting chemo again. Emotional support was provided. Questions were answered. A Pharmacist also met with the pt.     Slightly itchy area noted to left forearm during infusion. Infusion was stopped and D5W was initiated. Pt denies any other itching or symptoms of any kind. No hives noted. Area appears red from pt itching. Pt reports she feels it is from dry skin. Doxil was restarted and completed without incident.       Intravenous Access:  Implanted Port.    Treatment Conditions:  Lab Results   Component Value Date    HGB 13.3 04/14/2022    WBC 5.4 04/14/2022    ANEU 1.9 10/26/2021    ANEUTAUTO 3.4 04/14/2022     04/14/2022      Lab Results   Component Value Date     04/14/2022    POTASSIUM 4.0 04/14/2022    MAG 2.0 12/28/2021    CR 0.86 04/14/2022    RAFAELA 9.7 04/14/2022    BILITOTAL 0.3 04/14/2022    ALBUMIN 4.0 04/14/2022    ALT 29 04/14/2022    AST 32 04/14/2022     Results reviewed, labs MET treatment parameters, ok to proceed with treatment.  ECHO done 4/7/22 - EF 60-65%    Post Infusion Assessment:  Patient tolerated infusion without incident.  Site patent and intact, free from redness, edema or discomfort.  No evidence of extravasations.  Access discontinued per protocol.       Discharge Plan:   AVS to patient via MYCHART.  Patient will return 5/2/22 for next appointment.   Patient discharged in stable condition accompanied by: self.  Departure Mode: Ambulatory.      Elicia Lindo RN

## 2022-04-14 NOTE — PROGRESS NOTES
Port needle left for access for treatment. Sterile technique performed and maintained. Patient tolerated procedure well. Tubes drawn in rainbow order. Tubes labeled and double signed. Transparent dressing placed with use of tegaderm.     Elicia Lindo RN  [facility] [place]

## 2022-04-16 DIAGNOSIS — F41.9 ANXIETY: ICD-10-CM

## 2022-04-18 RX ORDER — BUSPIRONE HYDROCHLORIDE 15 MG/1
TABLET ORAL
Qty: 180 TABLET | Refills: 1 | Status: SHIPPED | OUTPATIENT
Start: 2022-04-18 | End: 2022-09-26

## 2022-04-20 DIAGNOSIS — C79.51 BONE METASTASES: ICD-10-CM

## 2022-04-20 DIAGNOSIS — G89.3 CANCER ASSOCIATED PAIN: ICD-10-CM

## 2022-04-20 RX ORDER — BUPRENORPHINE 10 UG/H
1 PATCH TRANSDERMAL
Qty: 4 PATCH | Refills: 0 | Status: SHIPPED | OUTPATIENT
Start: 2022-04-20 | End: 2022-05-22

## 2022-04-21 ENCOUNTER — TELEPHONE (OUTPATIENT)
Dept: ONCOLOGY | Facility: CLINIC | Age: 62
End: 2022-04-21
Payer: MEDICARE

## 2022-04-21 ENCOUNTER — TELEPHONE (OUTPATIENT)
Dept: ONCOLOGY | Facility: CLINIC | Age: 62
End: 2022-04-21

## 2022-04-21 ENCOUNTER — INFUSION THERAPY VISIT (OUTPATIENT)
Dept: INFUSION THERAPY | Facility: CLINIC | Age: 62
End: 2022-04-21
Payer: MEDICARE

## 2022-04-21 VITALS
OXYGEN SATURATION: 100 % | SYSTOLIC BLOOD PRESSURE: 142 MMHG | RESPIRATION RATE: 16 BRPM | TEMPERATURE: 98.1 F | DIASTOLIC BLOOD PRESSURE: 95 MMHG | WEIGHT: 131.5 LBS | HEART RATE: 101 BPM | BODY MASS INDEX: 24.05 KG/M2

## 2022-04-21 DIAGNOSIS — C50.919 METASTATIC BREAST CANCER: ICD-10-CM

## 2022-04-21 DIAGNOSIS — R11.2 NAUSEA AND VOMITING, INTRACTABILITY OF VOMITING NOT SPECIFIED, UNSPECIFIED VOMITING TYPE: Primary | ICD-10-CM

## 2022-04-21 DIAGNOSIS — M89.8X9 BONE PAIN: ICD-10-CM

## 2022-04-21 DIAGNOSIS — C79.51 BONE METASTASES: ICD-10-CM

## 2022-04-21 LAB
ANION GAP SERPL CALCULATED.3IONS-SCNC: 15 MMOL/L (ref 3–14)
BASOPHILS # BLD AUTO: 0 10E3/UL (ref 0–0.2)
BASOPHILS NFR BLD AUTO: 0 %
BUN SERPL-MCNC: 24 MG/DL (ref 7–30)
CALCIUM SERPL-MCNC: 9.8 MG/DL (ref 8.5–10.1)
CHLORIDE BLD-SCNC: 92 MMOL/L (ref 94–109)
CO2 SERPL-SCNC: 25 MMOL/L (ref 20–32)
CREAT SERPL-MCNC: 0.89 MG/DL (ref 0.52–1.04)
EOSINOPHIL # BLD AUTO: 0 10E3/UL (ref 0–0.7)
EOSINOPHIL NFR BLD AUTO: 0 %
ERYTHROCYTE [DISTWIDTH] IN BLOOD BY AUTOMATED COUNT: 14.5 % (ref 10–15)
GFR SERPL CREATININE-BSD FRML MDRD: 74 ML/MIN/1.73M2
GLUCOSE BLD-MCNC: 131 MG/DL (ref 70–99)
HCT VFR BLD AUTO: 38.8 % (ref 35–47)
HGB BLD-MCNC: 13.5 G/DL (ref 11.7–15.7)
IMM GRANULOCYTES # BLD: 0.1 10E3/UL
IMM GRANULOCYTES NFR BLD: 1 %
LYMPHOCYTES # BLD AUTO: 0.7 10E3/UL (ref 0.8–5.3)
LYMPHOCYTES NFR BLD AUTO: 7 %
MAGNESIUM SERPL-MCNC: 2 MG/DL (ref 1.6–2.3)
MCH RBC QN AUTO: 27.4 PG (ref 26.5–33)
MCHC RBC AUTO-ENTMCNC: 34.8 G/DL (ref 31.5–36.5)
MCV RBC AUTO: 79 FL (ref 78–100)
MONOCYTES # BLD AUTO: 0.9 10E3/UL (ref 0–1.3)
MONOCYTES NFR BLD AUTO: 10 %
NEUTROPHILS # BLD AUTO: 7.3 10E3/UL (ref 1.6–8.3)
NEUTROPHILS NFR BLD AUTO: 82 %
NRBC # BLD AUTO: 0 10E3/UL
NRBC BLD AUTO-RTO: 0 /100
PLATELET # BLD AUTO: 294 10E3/UL (ref 150–450)
POTASSIUM BLD-SCNC: 3 MMOL/L (ref 3.4–5.3)
RBC # BLD AUTO: 4.93 10E6/UL (ref 3.8–5.2)
SODIUM SERPL-SCNC: 132 MMOL/L (ref 133–144)
WBC # BLD AUTO: 8.9 10E3/UL (ref 4–11)

## 2022-04-21 PROCEDURE — 96361 HYDRATE IV INFUSION ADD-ON: CPT | Performed by: NURSE PRACTITIONER

## 2022-04-21 PROCEDURE — 99207 PR NO CHARGE LOS: CPT | Performed by: NURSE PRACTITIONER

## 2022-04-21 PROCEDURE — 96374 THER/PROPH/DIAG INJ IV PUSH: CPT | Performed by: NURSE PRACTITIONER

## 2022-04-21 PROCEDURE — 83735 ASSAY OF MAGNESIUM: CPT | Performed by: NURSE PRACTITIONER

## 2022-04-21 PROCEDURE — 80048 BASIC METABOLIC PNL TOTAL CA: CPT | Performed by: NURSE PRACTITIONER

## 2022-04-21 PROCEDURE — 85025 COMPLETE CBC W/AUTO DIFF WBC: CPT | Performed by: NURSE PRACTITIONER

## 2022-04-21 PROCEDURE — 96375 TX/PRO/DX INJ NEW DRUG ADDON: CPT | Performed by: NURSE PRACTITIONER

## 2022-04-21 RX ORDER — HEPARIN SODIUM (PORCINE) LOCK FLUSH IV SOLN 100 UNIT/ML 100 UNIT/ML
5 SOLUTION INTRAVENOUS EVERY 8 HOURS
Status: DISCONTINUED | OUTPATIENT
Start: 2022-04-21 | End: 2022-04-21 | Stop reason: HOSPADM

## 2022-04-21 RX ORDER — HEPARIN SODIUM (PORCINE) LOCK FLUSH IV SOLN 100 UNIT/ML 100 UNIT/ML
5 SOLUTION INTRAVENOUS
Status: CANCELLED | OUTPATIENT
Start: 2022-04-21

## 2022-04-21 RX ORDER — ONDANSETRON 8 MG/1
8 TABLET, ORALLY DISINTEGRATING ORAL EVERY 8 HOURS PRN
Qty: 30 TABLET | Refills: 1 | Status: SHIPPED | OUTPATIENT
Start: 2022-04-21 | End: 2022-11-18

## 2022-04-21 RX ORDER — ONDANSETRON 2 MG/ML
8 INJECTION INTRAMUSCULAR; INTRAVENOUS ONCE
Status: CANCELLED
Start: 2022-04-21 | End: 2022-04-21

## 2022-04-21 RX ORDER — HEPARIN SODIUM,PORCINE 10 UNIT/ML
5 VIAL (ML) INTRAVENOUS
Status: CANCELLED | OUTPATIENT
Start: 2022-04-21

## 2022-04-21 RX ORDER — ONDANSETRON 2 MG/ML
8 INJECTION INTRAMUSCULAR; INTRAVENOUS ONCE
Status: COMPLETED | OUTPATIENT
Start: 2022-04-21 | End: 2022-04-21

## 2022-04-21 RX ADMIN — Medication 0.3 MG: at 13:46

## 2022-04-21 RX ADMIN — HEPARIN SODIUM (PORCINE) LOCK FLUSH IV SOLN 100 UNIT/ML 5 ML: 100 SOLUTION at 14:43

## 2022-04-21 RX ADMIN — Medication 1000 ML: at 12:48

## 2022-04-21 RX ADMIN — ONDANSETRON 8 MG: 2 INJECTION INTRAMUSCULAR; INTRAVENOUS at 12:54

## 2022-04-21 ASSESSMENT — PAIN SCALES - GENERAL: PAINLEVEL: SEVERE PAIN (6)

## 2022-04-21 NOTE — TELEPHONE ENCOUNTER
Writer coordinated with [facility] who spoke with Annie BELLA. Pt okayed to come in for labs and IVF and antiemetics.    Writer called pt to offer this treatment. Pt stated she would like that and will come into the clinic.    Pt to come to  clinic for 11:15 labs and 12:00 IVF and antiemetics today.

## 2022-04-21 NOTE — TELEPHONE ENCOUNTER
TC with pt about results of her metabolic panel. Would like pt to take the 4 tabs of potassium 10 mEq today to get your potassium back into normal range. Please also consider using some high potassium foods to help with this as well.  Pt stated understanding and confirmed her supply of potassium is 10mEq for each tab. SGermscheid,CNP

## 2022-04-21 NOTE — PROGRESS NOTES
Infusion Nursing Note:  Shaneka Alvarez presents today for 1 L IVF, IV Zofran, and IV Dilaudid.    Patient seen by provider today: No   present during visit today: Not Applicable.    Note: Pt expressed being very nauseated today. Pt drank her gatorade too fast and has emesis x 2 due to this. Zofran given with better management of symptoms. Patient expressed improvement in symptoms post infusion. Pt expressed back pain at a 8/10 and has been unable to take PRN Morphine due to nausea.       Intravenous Access:  Implanted Port.    Treatment Conditions:  Lab Results   Component Value Date    HGB 13.5 04/21/2022    WBC 8.9 04/21/2022    ANEU 1.9 10/26/2021    ANEUTAUTO 7.3 04/21/2022     04/21/2022      Lab Results   Component Value Date     (L) 04/21/2022    POTASSIUM 3.0 (L) 04/21/2022    MAG 2.0 04/21/2022    CR 0.89 04/21/2022    RAFAELA 9.8 04/21/2022    BILITOTAL 0.3 04/14/2022    ALBUMIN 4.0 04/14/2022    ALT 29 04/14/2022    AST 32 04/14/2022         Post Infusion Assessment:  Patient tolerated infusion without incident.  Blood return noted pre and post infusion.  Site patent and intact, free from redness, edema or discomfort.  No evidence of extravasations.  Access discontinued per protocol.       Discharge Plan:   AVS to patient via MYCHART.  Patient will return 5/12/22 for next appointment.   Patient discharged in stable condition accompanied by: self.  Departure Mode: Ambulatory.      Halima Bullock RN

## 2022-04-21 NOTE — TELEPHONE ENCOUNTER
Pt calling in to report symptoms of nausea, vomiting, and cramping of the feet. The nausea and vomiting started on Tuesday 4/19. Pt had Doxil infusion on 4/15. The nausea and vomiting continued Tuesday through last night but has stopped this morning. Pt stated that she has vomited about a 20 times per day for the last two days, except for today. No blood noted in the vomit. Pt has taken compazine which has been ineffective for treating the nausea.    Pt states that her feet started cramping last night, and she is still continued to experience them. Pt states that she has had good oral fluid intake, but has not eaten much over the last few days.    Pt temp this morning is 98.7, and pt endorses a slight headache. Denies feeling like she is going to faint.    Denies fevers, unexplained weight changes, headaches, dizziness, vision or hearing changes, new lumps or bumps, chest pain, shortness of breath, cough, abdominal pain, changes to bowel or bladder, swelling of extremities, bleeding issues, or rash.     Advised pt to increase oral fluids as able, especially those with electrolytes including gatorade and pedialyte. Advised to change positions slowly. Advised of signs/symptoms of when to seek care in the ED.    Pt requesting to come in for IVF and IV antiemetics.    Will route to provider for recommendations.

## 2022-04-22 ENCOUNTER — TELEPHONE (OUTPATIENT)
Dept: ONCOLOGY | Facility: CLINIC | Age: 62
End: 2022-04-22
Payer: MEDICARE

## 2022-04-22 NOTE — TELEPHONE ENCOUNTER
"Per charge nurse's order, called patient to follow up after IV fluids yesterday, and possibly offer more IV fluids today.    Patient states she started feeling better after she got home last eveing.  States today, \"I'm doing great.\"    Offered patient more IV fluids today, before the weekend, and patient declined.    Encouraged patient to call back if symptoms worsen.  Patient verbalized understanding and agreement with this plan.    Lauren Shaw RN on Friday 4/22/2022 at 9:03 AM        "

## 2022-04-23 DIAGNOSIS — I26.99 ACUTE PULMONARY EMBOLISM WITHOUT ACUTE COR PULMONALE, UNSPECIFIED PULMONARY EMBOLISM TYPE (H): ICD-10-CM

## 2022-04-25 RX ORDER — APIXABAN 5 MG/1
TABLET, FILM COATED ORAL
Qty: 60 TABLET | Refills: 3 | Status: SHIPPED | OUTPATIENT
Start: 2022-04-25 | End: 2022-09-19

## 2022-04-25 NOTE — TELEPHONE ENCOUNTER
Requested Prescriptions   Pending Prescriptions Disp Refills    ELIQUIS ANTICOAGULANT 5 MG tablet [Pharmacy Med Name: ELIQUIS 5MG TABLETS] 60 tablet 3     Sig: TAKE 1 TABLET(5 MG) BY MOUTH TWICE DAILY        Direct Oral Anticoagulant Agents Failed - 4/23/2022  8:09 AM        Failed - Weight is greater than 60 kg for the past year       Wt Readings from Last 3 Encounters:   04/21/22 59.6 kg (131 lb 8 oz)   04/14/22 62.1 kg (136 lb 12.8 oz)   03/28/22 64.6 kg (142 lb 6.4 oz)             Passed - Normal Platelets on file in past 12 months       Recent Labs   Lab Test 04/21/22  1249                  Passed - Medication is active on med list        Passed - Patient is 18-79 years of age        Passed - Serum creatinine less than or equal to 1.4 on file in past 12 mos     Recent Labs   Lab Test 04/21/22  1249 12/29/16  0946 08/24/16  1206   CR 0.89   < >  --    CREAT  --   --  0.8    < > = values in this interval not displayed.       Ok to refill medication if creatinine is low          Passed - No active pregnancy on record        Passed - No positive pregnancy test within past 12 months        Passed - Recent (6 mo) or future (30 days) visit within the authorizing provider's specialty

## 2022-04-26 NOTE — NURSING NOTE
"Shaneka Alvarez is a 59 year old female who is being evaluated via a billable video visit.      The patient has been notified of following:     \"This video visit will be conducted via a call between you and your physician/provider. We have found that certain health care needs can be provided without the need for an in-person physical exam.  This service lets us provide the care you need with a video conversation.  If a prescription is necessary we can send it directly to your pharmacy.  If lab work is needed we can place an order for that and you can then stop by our lab to have the test done at a later time.    If during the course of the call the physician/provider feels a video visit is not appropriate, you will not be charged for this service.\"     Patient has given verbal consent for Video visit? Yes    Patient would like the video invitation sent by: Text to cell phone: [phone]    Video Start Time: 11:43 AM    Shaneka Alvarez complains of    Chief Complaint   Patient presents with     Oncology Clinic Visit     prior to treatment - discuss results from PET scan       I have reviewed and updated the patient's Past Medical History, Social History, Family History and Medication List.    ALLERGIES  Bactrim [sulfamethoxazole w/trimethoprim] and Copaxone [glatiramer]    Additional provider notes: See  MD note from today for details.      Video-Visit Details    Type of service:  Video Visit    Video End Time (time video stopped): 11:55PM    Originating Location (pt. Location): Other In clinic prior to infusion    Distant Location (provider location):  [facility]     Mode of Communication:  Video Conference via Sabrina Thakur CMA      "
not applicable (Male)

## 2022-04-29 ENCOUNTER — TELEPHONE (OUTPATIENT)
Dept: ONCOLOGY | Facility: CLINIC | Age: 62
End: 2022-04-29
Payer: MEDICARE

## 2022-04-29 ENCOUNTER — INFUSION THERAPY VISIT (OUTPATIENT)
Dept: INFUSION THERAPY | Facility: CLINIC | Age: 62
End: 2022-04-29
Payer: MEDICARE

## 2022-04-29 VITALS
OXYGEN SATURATION: 98 % | HEART RATE: 74 BPM | SYSTOLIC BLOOD PRESSURE: 138 MMHG | RESPIRATION RATE: 16 BRPM | DIASTOLIC BLOOD PRESSURE: 74 MMHG

## 2022-04-29 DIAGNOSIS — C50.919 METASTATIC BREAST CANCER: ICD-10-CM

## 2022-04-29 DIAGNOSIS — C79.51 BONE METASTASES: Primary | ICD-10-CM

## 2022-04-29 PROCEDURE — 96374 THER/PROPH/DIAG INJ IV PUSH: CPT | Performed by: INTERNAL MEDICINE

## 2022-04-29 PROCEDURE — 96361 HYDRATE IV INFUSION ADD-ON: CPT | Performed by: INTERNAL MEDICINE

## 2022-04-29 PROCEDURE — 99207 PR NO CHARGE LOS: CPT

## 2022-04-29 RX ORDER — HEPARIN SODIUM (PORCINE) LOCK FLUSH IV SOLN 100 UNIT/ML 100 UNIT/ML
5 SOLUTION INTRAVENOUS
Status: DISCONTINUED | OUTPATIENT
Start: 2022-04-29 | End: 2022-04-29 | Stop reason: HOSPADM

## 2022-04-29 RX ORDER — HEPARIN SODIUM,PORCINE 10 UNIT/ML
5 VIAL (ML) INTRAVENOUS
Status: DISCONTINUED | OUTPATIENT
Start: 2022-04-29 | End: 2022-04-29 | Stop reason: HOSPADM

## 2022-04-29 RX ORDER — ONDANSETRON 2 MG/ML
8 INJECTION INTRAMUSCULAR; INTRAVENOUS ONCE
Status: COMPLETED | OUTPATIENT
Start: 2022-04-29 | End: 2022-04-29

## 2022-04-29 RX ORDER — HEPARIN SODIUM (PORCINE) LOCK FLUSH IV SOLN 100 UNIT/ML 100 UNIT/ML
5 SOLUTION INTRAVENOUS
Status: CANCELLED | OUTPATIENT
Start: 2022-04-29

## 2022-04-29 RX ORDER — HEPARIN SODIUM,PORCINE 10 UNIT/ML
5 VIAL (ML) INTRAVENOUS
Status: CANCELLED | OUTPATIENT
Start: 2022-04-29

## 2022-04-29 RX ORDER — ONDANSETRON 2 MG/ML
8 INJECTION INTRAMUSCULAR; INTRAVENOUS ONCE
Status: CANCELLED
Start: 2022-04-29 | End: 2022-04-29

## 2022-04-29 RX ADMIN — ONDANSETRON 8 MG: 2 INJECTION INTRAMUSCULAR; INTRAVENOUS at 14:43

## 2022-04-29 RX ADMIN — Medication 1000 ML: at 14:41

## 2022-04-29 RX ADMIN — HEPARIN SODIUM (PORCINE) LOCK FLUSH IV SOLN 100 UNIT/ML 5 ML: 100 SOLUTION at 15:46

## 2022-04-29 ASSESSMENT — PAIN SCALES - GENERAL: PAINLEVEL: SEVERE PAIN (7)

## 2022-04-29 NOTE — TELEPHONE ENCOUNTER
Received a phone call from Juan in Triage to help get Shaneka scheduled for IVF infusion today. Communication between Dr. Ruelas and Antonietta Arana assisted in the decision to schedule fluids.     Patient will be in this afternoon, at [place].    Evert Williamson  4/29/22

## 2022-04-29 NOTE — TELEPHONE ENCOUNTER
"Pt calling in to report symptoms of a headache and nausea. These symptoms started about 2 weeks ago after chemo, and have been mild until today. Pt started to not feel well yesterday and has been in bed most of yesterday and today. Pt also endorses chills/night sweats most nights for the past week, but denies ever having a fever.    Pt denies any vomiting, and temp on the phone was 97.6. Pt states that she has been drinking a lot of fluids, but has not eaten much food due to the nausea.     Pt rates her headache to a 7/10, and describes it as crushing. Pt has history of migraines and states \"this feels like a migraine\". Pt feels like she might start vomiting soon.    Pt took morphine for the headache earlier and compazine for nausea. Neither medication helped alleviate symptoms.    Denies fevers, unexplained weight changes, vision or hearing changes, new lumps or bumps, chest pain, shortness of breath, cough, abdominal pain, vomiting, changes to bowel or bladder, swelling of extremities, bleeding issues, or rash.     Advised pt to rest with use of cool wash cloth in a low stimulating environment. No noise and low light.    Will route to provider for recommendations.        "

## 2022-04-29 NOTE — TELEPHONE ENCOUNTER
Pt scheduled for IVF and antiemetics today at 2:00 per Evert in scheduling and pt has been notified.

## 2022-04-29 NOTE — PROGRESS NOTES
Infusion Nursing Note:  Shaneka RICKY Alvarez presents today for IVF/Zofran.    Patient seen by provider today: No   present during visit today: Not Applicable.    Note: N/A.    Intravenous Access:  Implanted Port.    Treatment Conditions:  Not Applicable.    Post Infusion Assessment:  Patient tolerated infusion without incident.  Blood return noted pre and post infusion.  Site patent and intact, free from redness, edema or discomfort.  No evidence of extravasations.  Access discontinued per protocol.     Discharge Plan:   Patient will return 5/13/2022 for next appointment.   Patient discharged in stable condition accompanied by: self.  Departure Mode: Ambulatory.    Joanne Ludwig RN-BSN, PHN, OCN  [facility]

## 2022-05-03 ENCOUNTER — PATIENT OUTREACH (OUTPATIENT)
Dept: ONCOLOGY | Facility: CLINIC | Age: 62
End: 2022-05-03
Payer: MEDICARE

## 2022-05-06 ENCOUNTER — PATIENT OUTREACH (OUTPATIENT)
Dept: ONCOLOGY | Facility: CLINIC | Age: 62
End: 2022-05-06
Payer: MEDICARE

## 2022-05-06 NOTE — PROGRESS NOTES
FMLA form completed for patient's daughter, Nemesio and faxed to [employer]:  At [phone].  FAX confirmed as received.

## 2022-05-10 DIAGNOSIS — D70.1 CHEMOTHERAPY-INDUCED NEUTROPENIA (H): ICD-10-CM

## 2022-05-10 DIAGNOSIS — E87.6 HYPOKALEMIA: ICD-10-CM

## 2022-05-10 DIAGNOSIS — C50.912 BILATERAL MALIGNANT NEOPLASM OF BREAST IN FEMALE, UNSPECIFIED ESTROGEN RECEPTOR STATUS, UNSPECIFIED SITE OF BREAST (H): ICD-10-CM

## 2022-05-10 DIAGNOSIS — C50.919 METASTATIC BREAST CANCER: ICD-10-CM

## 2022-05-10 DIAGNOSIS — C79.51 BONE METASTASES: Primary | ICD-10-CM

## 2022-05-10 DIAGNOSIS — T45.1X5A CHEMOTHERAPY-INDUCED NEUTROPENIA (H): ICD-10-CM

## 2022-05-10 DIAGNOSIS — C50.911 BILATERAL MALIGNANT NEOPLASM OF BREAST IN FEMALE, UNSPECIFIED ESTROGEN RECEPTOR STATUS, UNSPECIFIED SITE OF BREAST (H): ICD-10-CM

## 2022-05-10 RX ORDER — NALOXONE HYDROCHLORIDE 0.4 MG/ML
0.2 INJECTION, SOLUTION INTRAMUSCULAR; INTRAVENOUS; SUBCUTANEOUS
Status: CANCELLED | OUTPATIENT
Start: 2022-05-13

## 2022-05-10 RX ORDER — ALBUTEROL SULFATE 90 UG/1
1-2 AEROSOL, METERED RESPIRATORY (INHALATION)
Status: CANCELLED
Start: 2022-05-13

## 2022-05-10 RX ORDER — HEPARIN SODIUM (PORCINE) LOCK FLUSH IV SOLN 100 UNIT/ML 100 UNIT/ML
5 SOLUTION INTRAVENOUS
Status: CANCELLED | OUTPATIENT
Start: 2022-05-13

## 2022-05-10 RX ORDER — MEPERIDINE HYDROCHLORIDE 25 MG/ML
25 INJECTION INTRAMUSCULAR; INTRAVENOUS; SUBCUTANEOUS EVERY 30 MIN PRN
Status: CANCELLED | OUTPATIENT
Start: 2022-05-13

## 2022-05-10 RX ORDER — HEPARIN SODIUM,PORCINE 10 UNIT/ML
5 VIAL (ML) INTRAVENOUS
Status: CANCELLED | OUTPATIENT
Start: 2022-05-13

## 2022-05-10 RX ORDER — DIPHENHYDRAMINE HYDROCHLORIDE 50 MG/ML
50 INJECTION INTRAMUSCULAR; INTRAVENOUS
Status: CANCELLED
Start: 2022-05-13

## 2022-05-10 RX ORDER — ALBUTEROL SULFATE 0.83 MG/ML
2.5 SOLUTION RESPIRATORY (INHALATION)
Status: CANCELLED | OUTPATIENT
Start: 2022-05-13

## 2022-05-10 RX ORDER — METHYLPREDNISOLONE SODIUM SUCCINATE 125 MG/2ML
125 INJECTION, POWDER, LYOPHILIZED, FOR SOLUTION INTRAMUSCULAR; INTRAVENOUS
Status: CANCELLED
Start: 2022-05-13

## 2022-05-10 RX ORDER — EPINEPHRINE 1 MG/ML
0.3 INJECTION, SOLUTION INTRAMUSCULAR; SUBCUTANEOUS EVERY 5 MIN PRN
Status: CANCELLED | OUTPATIENT
Start: 2022-05-13

## 2022-05-10 NOTE — PROGRESS NOTES
Shaneka is a 61 year old who is being evaluated via a billable video visit.    How would you like to obtain your AVS? [employer]  If the video visit is dropped, the invitation should be resent by: Text to cell phone: [phone]  Will anyone else be joining your video visit? No    VIDEO VISIT  Video Start Time: 9:05 AM  Video End Time:9:26 AM  Patient location: Home  Provider location:  [facility]   Platform used for Video Visit: Tenrox    Hematology/Oncology Video Visit  Care Team:  - Oncologist: Dr. Zepeda  - PCP: [facility]    Reason for visit: follow up prior to cycle 2 liposomal doxorubicin    Diagnosis: metastatic (bone) triple negative breast cancer    Cancer and Treatment Hx:  INITIAL DIAGNOSIS  Jan 2006: diagnosed with Stage IIB, T2 N1 M0 invasive lobular carcinoma of the right breast. Final pathology showed 4.5 x 3.8 x 2.5 cm, 1/14 lymph nodes positive. ER/MD+ and HER-2 negative. Genetics with VUSin MSH2 gene  2006: ECOG 2104 protocol of dose-dense AC + Avastin x4 cycles followed by Taxol and Avastin x4 cycles.   Jan 2007: completed radiation to right breast  Mar [id number]: Aromasin, stopped after moving to the [facility]  Jan 2016: bilateral mastectomy with flap reconstruction     RELAPSE  Aug 2019: MRI brain for multiple sclerosis found multiple calvarial lesions suspicious for metastatic disease and no new MS lesions. PET scan with widespread bone metastatic lesions (calvarium, spine, sacrum, pelvis, ribs most prominent at C7, T3, L1 and L4), hypermetabolic 3 cm lesion in LLL, and mediastinal/hilar LN. CEA at 1.9 and C27-29 at 415 (28 on 8/23/16). Biopsy of the right iliac bone was consistent with metastatic adenocarcinoma (breast), ER/MD negative, HER-2 negative PDL1 < 1%. A second biopsy of the LLL nodule via EBUS on 9/5/19 was negative for malignancy.  Sep 2019: MRI spine with numerous enhancing lesions of the spine, largest around T9 and L1, no cord  compression, L1 compression fracture and impending L4. Neurosurgery recommended no surgical intervention but to wear Gorge brace.  Sep 18, 2019: completed T12-L5 radiation  Oct 2019: Xeloda x 2 trials lowering dose to 1000 mg BID, every 3 month Zometa  Nov 2019- Jan 2020: weekly paclitaxel x 3 cycles  Jan- Oct 2020: Eribulin  Nov 2020- Dec 2021: Trodelvy, biopsy of R acetabulum in Feb with triple negative breast cancer, androgen receptor positive, PDL1 negative, no actionable mutations on Foundation 1 Jan- Mar 2022: bicalutamide with progression  Apr 2020: liposomal doxorubicin     Interval History:  Shaneka presents via video visit prior to cycle 2 doxorubicin. She had a rough time after her first dose. She spent two weeks in bed with nausea, vomiting, and migraines on and off. She required IVF and IV nausea meds for vomiting. Then she felt good for a week and now migraines have come back. Headache pain is very consistent with prior migraines she has had. She has had a total of 4 migraines this month, each lasting about 4 days. Time is the only thing that improved headache, she no longer has any abortive medications for migraines since they haven't been an issue recently (had been better while undergoing chemo). She is not eating well due to a reduced appetite and queasiness when she does eat. Drinking well. Cancer related pain is stable with use of pain patch and morphine about 3 times per month. She is planning on visiting her daughter in [place] on June 2 so does not want to delay treatment, she feels ready for treatment on 5/13.    Medications:  Discussed pertinent medications.    Physical Exam:   GEN: pleasantly conversant female no acute distress  SKIN: generally intact, no visible rash, bruising, or sores   ENT: eyes non-icteric, EOMI  RESP: breathing easily on room air, no cough  MSK: appears to have normal range of motion based on visualized movements  NEURO:  no notable tremors, facial movements symmetric,  alert and oriented without obvious focal deficit  The rest of a comprehensive physical examination is deferred due to PHE (public health emergency) video restrictions  There were no vitals taken for this visit.    Wt Readings from Last 4 Encounters:   04/21/22 59.6 kg (131 lb 8 oz)   04/14/22 62.1 kg (136 lb 12.8 oz)   03/28/22 64.6 kg (142 lb 6.4 oz)   03/18/22 63.2 kg (139 lb 4.8 oz)     Labs:  Labs will be drawn prior to infusion on 5/13    Imaging:  Reviewed read of PET from 3/31/22:  IMPRESSION: In this patient with history of metastatic breast cancer:  1. There is progressive metastatic osseous disease. Examples are  detailed above and include pelvis, vertebral bodies, right clavicle, and sternum.  2. Additional scattered sclerotic non-FDG avid osseous lesions compatible with treated disease.    Assessment and Plan:  Metastatic breast cancer  - PET 3/31 with disease progression and Ca27-19 increased at 5,307  - Started liposomal doxorubicin 4/14 and has experienced significant nausea/vomiting and migraine headaches  - Dose reduced cycle 2 doxorubicin 20% for better tolerability, patient would prefer to not defer treatment and feels ready on 5/13 if labs meeting parameters  - Follow up prior to each cycle with Oncology provider     Metastasis to bone  - Monthly Xgeva and continues on calcium and vitamin D supplementation     Nausea secondary to chemotherapy  - Will add Emend to premeds, has compazine and zofran PRN  - Zofran may be adding to migraines so try to avoid  - Will arrange for IVF twice the week after chemo  - Could consider adding Zyprexa, will discuss with Dr. Whitaker later today    Migraine headaches  - Migraines lasting up to 4 days at a time  - Does not have any abortive meds available, previously on Aimovig from Neuro  - Neuro consult placed for management  - Will attempt to better control nausea and avoid zofran this cycle if possible as it may be contributing to headaches  - Consider brain MRI  if progressive/persistent to assess for acute findings or mets     Cancer related pain  - Stable with buprenorphine patch and morphine for breakthrough pain  - Followed by Palliative Medicine, next appt later today      The total time of this encounter amounted to 30 minutes today. This time includes video time spent with the patient, prep work, ordering tests, and performing post-visit documentation.    Shannan Schilling, CNP

## 2022-05-10 NOTE — PROGRESS NOTES
Shaneka is a 61 year old who is being evaluated via a billable video visit.  Currently in state of [place].    How would you like to obtain your AVS? MyChart  If the video visit is dropped, the invitation should be resent by: Text to cell phone: [phone]  Will anyone else be joining your video visit? TATYANA Lee      Palliative Care Outpatient Clinic Progress Note    Patient Name: Sahneka Alvarez is being evaluated via a billable video visit.    Primary Provider:  [facility], [facility]  Primary Oncologist: Dr Zepeda  Last seen by palliative care: myself, 2/9/22      Chief Complaint: migraines    Medical History/Summary:  - breast cancer ER+/MD+, Her2 neg diagnosed in 2006              - s/p systemic treatment with adriamycin, cytoxan, avastin, aromatase inhibitor as well as b/l mastectomy              - relapsed metastatic disease found in skull, vertebrae complicated by pathological fractures L1, L5              - s/p XRT to T12-L5 Sept 2019. Didn't tolerate Xeloda, paclitaxel Nov 2019-Jan 2020, treated with eribulin and zometa. PD seen on PET Nov 2020 as well as a new L 4th rib concerning for a pathologic fracture. Started Trodelvy 11/11/20, PD Dec 21, switched to casodex.    - PD again on PET/CT March 2022 including b/l iliac crest lesions, R clavicle, and multifocal T spine involvement. Switched to single agent Doxil (considering dose decrease to 80% in light of side effects)  - b/l PE found on PET July 2020, on anticoagulation  - MS with mild right-sided weakness    - long-standing history of migraines        Interim History:  At the last visit we discussed an isolated episode of lower back pain, she was otherwise doing well at the time.     Patient is on the call by herself    She had a lot of struggles after the last round of chemo. She experienced significant nausea and her migraines got triggered again. She had four episodes in the past month, averaging around 4 days each.   She had  migraines in the past, which felt similar to the current episodes. During chemo she had a marked decrease in frequency and severity, which has now worsened again. She had been on multiple treatments for migraines without success. She does get some benefit from the Nerivio Band as long as she places it at the very first sign of onset. She had been started on Aimovig right before her cancer was diagnosed and doesn't feel she gave that a sufficient trial. Would be interested in another trial of that while waiting for the appointment with neurology.    She had almost two weeks of marked nausea and fatigue after the most recent chemo resulting in decreased po intake and the need for IVF. Oncology is aware and making adjustments to her premedications. Discussed olanzapine and will add to her prn meds.     Her pain has been well controlled with buprenorphine 10mcg/h patch.       Coping:  She realizes that it will be difficult to find meaningful treatment options for her cancer should the current one not be an option anymore. She would like to continue cancer-directed treatment for as long as possible. She also realizes that at one point the burden of side effects and treatment logistics will outweigh any benefit. She doesn't want to talk about that quite yet, but would like to explore with her oncology team if there are indeed further treatment options available to her     Social History:  Pertinent changes to social history/social situation since last visit: no changes    Key support resources: family    Advance Directive Status:  POLST completed 20: DNR/selective treatments      Impression & Recommendations & Counseliny/o woman with metastatic breast cancer on single-agent doxil complicated by recurrence of migraines and treatment side effects    Migraines: recurrence of previous migraines.   - Aimovig subcutaneous monthly  - await neurology input  - continue nerivio band    Nausea:  - continue prn zofran,  compazine   - await effect of additional pre-chemo antiemetics  - olanzapine 2.5-5mg bid prn    Pain: cancer-related. Well controlled with buprenrophine 10mcg/hr patch    Return to clinic in 4-6 weeks    Data / Chart Review:    Review of Systems:   ROS: 10 point ROS neg other than the symptoms noted above in the HPI and pertinents here.         Physical Exam:   Physical Exam:  Vital Signs not checked, virtual visit    CONSTIT: awake, appears comfortable  EENT: MMM, EOMI, no icterus  RESP: reg, nl effort, no cough  SKIN: no rash, no obvious lesions  NEURO: alert, oriented x3  PSYCH: appropriate affect, memory and thought process intact    The rest of a comprehensive physical examination is deferred  due to public [facility] emergency video visit restrictions.        Current pertinent medications:  Buprenorphine 10mcg/hr patch, weekly  MSIR 15-30mg q3h prn              Naloxone prescribed  acetaminophen 1000mg tid  Ibuprofen 600mg q6h prn  CBD cream  Lidocaine patch     Dexamethasone 4mg q12h     Venlafaxine 225mg daily  Lorazepam 1mg prn  Trazodone 50mg HS  Buspirone 15mg bid     Olanzapine prn after chemo  Promethazine q6h prn  Senna bid prn, miralax bid prn     : ok     Opioid safety assessment:   Expected prognosis >1 year  Risk: Low (ORT 0)  Indication for Opioid Use: Moderate or Strong  Baseline UDT performed on: not sent yet  Naloxone Script provided if patient also on benzodiazepine: 12/4/2019   Indications for Tapering reviewed: good dose decrease with starting buprenorphine     No pertinent allergies         Lab and imaging data reviewed:  Comments:       Video-Visit Details    Type of service:  Video Visit    Physician has received verbal consent for a Video Visit from the patient? Yes    Video Start Time: 1010    Video End Time (time video stopped): 1048    Originating Location (pt. Location): Home    Distant Location (provider location):  [facility]     Mode of  Communication:  Video Conference via [employer]    Alva Whitaker MD  Palliative Medicine  Pager [phone]      Total time spent was 53 minutes. This includes chart review, patient encounter, communication with other providers, orders.     Alva Whitaker MD  Palliative Medicine  Pager [phone]

## 2022-05-11 ENCOUNTER — VIRTUAL VISIT (OUTPATIENT)
Dept: ONCOLOGY | Facility: CLINIC | Age: 62
End: 2022-05-11
Attending: HOSPITALIST
Payer: MEDICARE

## 2022-05-11 ENCOUNTER — VIRTUAL VISIT (OUTPATIENT)
Dept: ONCOLOGY | Facility: CLINIC | Age: 62
End: 2022-05-11
Payer: MEDICARE

## 2022-05-11 DIAGNOSIS — T45.1X5A CHEMOTHERAPY INDUCED NAUSEA AND VOMITING: ICD-10-CM

## 2022-05-11 DIAGNOSIS — R11.2 NAUSEA AND VOMITING, INTRACTABILITY OF VOMITING NOT SPECIFIED, UNSPECIFIED VOMITING TYPE: ICD-10-CM

## 2022-05-11 DIAGNOSIS — C79.51 BONE METASTASES: ICD-10-CM

## 2022-05-11 DIAGNOSIS — C50.919 METASTATIC BREAST CANCER: Primary | ICD-10-CM

## 2022-05-11 DIAGNOSIS — C79.51 BONE METASTASES: Primary | ICD-10-CM

## 2022-05-11 DIAGNOSIS — G43.009 MIGRAINE WITHOUT AURA AND WITHOUT STATUS MIGRAINOSUS, NOT INTRACTABLE: ICD-10-CM

## 2022-05-11 DIAGNOSIS — G89.3 CANCER ASSOCIATED PAIN: ICD-10-CM

## 2022-05-11 DIAGNOSIS — R11.2 CHEMOTHERAPY INDUCED NAUSEA AND VOMITING: ICD-10-CM

## 2022-05-11 PROCEDURE — 99215 OFFICE O/P EST HI 40 MIN: CPT | Mod: 95 | Performed by: HOSPITALIST

## 2022-05-11 PROCEDURE — 99214 OFFICE O/P EST MOD 30 MIN: CPT | Mod: 95 | Performed by: NURSE PRACTITIONER

## 2022-05-11 NOTE — Clinical Note
Justo Zepeda,  Just FYI, I dose reduced Shaneka Alvarez cycle 2 liposomal doxorubicin 20% due to significant nausea/vomiting and worsening of migraines.

## 2022-05-11 NOTE — NURSING NOTE
DISCHARGE PLAN:  Next appointments: See patient instruction section  Departure Mode:   Accompanied by:    minutes for nursing discharge (face to face time)     Shaneka Alvarez had a video visit today for oncology follow up.  Patient was not seen by writing nurse at time of appointment. Calendar with upcoming appts and AVS mailed to the patient.  Appointments scheduled previously.  See patient instructions and Oncologist's Progress note for further details. Questions and concerns addressed to patient's satisfaction. Patient verbalized and demonstrated understanding of plan.  Contact information provided and patient is encouraged to call with any that arise in the interim of care.    Gayla Baca  [facility]  [phone]  5/11/2022, 2:36 PM

## 2022-05-11 NOTE — PATIENT INSTRUCTIONS
Patient Instructions      Expand All Collapse All    Thank you for attending the Palliative Care Clinic appointment today.     1. Migraines  - inject  Aimovig under the skin once a month  - continue using the band  - await input from neurology    2. Nausea  - continue taking ondansetron (Zofran) and Compazine as you have been  - take olanzapine (Zyprexa) 2.5-5mg (1 or 2 tablets) up to twice a day as needed. Start with the evening dose, since it can cause sleepiness    Call if your symptoms change and the medications we have prescribed are not working.   Do not take your medications at any other dose or frequently than how they are prescribed. Call if you think we should make any changes    Call us at least 3 workdays in advance if you need a medication refill.    Please have all your pill bottles ready for your next appointment.     Return to clinic in 4-6 weeks for a follow up.     You can reach the Palliative Care Team during business hours at the following number:    - [phone]  - or send me a Ahead message    To reach the Palliative Care Provider on-call After-hours or on holidays and weekends, call: [phone].  Please note that we are not able to provide pain medication refills on evenings or weekends.

## 2022-05-11 NOTE — LETTER
5/11/2022         RE: Shaneka Alvarez  [address] [address] [address]        Dear Colleague,    Thank you for referring your patient, Shaneka Alvarez, to the [facility]. Please see a copy of my visit note below.    Shaneka is a 61 year old who is being evaluated via a billable video visit.  Currently in state of [place].    How would you like to obtain your AVS? MyChart  If the video visit is dropped, the invitation should be resent by: Text to cell phone: [phone]  Will anyone else be joining your video visit? No         TATYANA Champagne      Palliative Care Outpatient Clinic Progress Note    Patient Name: Shankea Alvarez is being evaluated via a billable video visit.    Primary Provider:  [facility]  Primary Oncologist: Dr Zepeda  Last seen by palliative care: myself, 2/9/22      Chief Complaint: migraines    Medical History/Summary:  - breast cancer ER+/UT+, Her2 neg diagnosed in 2006              - s/p systemic treatment with adriamycin, cytoxan, avastin, aromatase inhibitor as well as b/l mastectomy              - relapsed metastatic disease found in skull, vertebrae complicated by pathological fractures L1, L5              - s/p XRT to T12-L5 Sept 2019. Didn't tolerate Xeloda, paclitaxel Nov 2019-Jan 2020, treated with eribulin and zometa. PD seen on PET Nov 2020 as well as a new L 4th rib concerning for a pathologic fracture. Started Trodelvy 11/11/20, PD Dec 21, switched to casodex.    - PD again on PET/CT March 2022 including b/l iliac crest lesions, R clavicle, and multifocal T spine involvement. Switched to single agent Doxil (considering dose decrease to 80% in light of side effects)  - b/l PE found on PET July 2020, on anticoagulation  - MS with mild right-sided weakness    - long-standing history of migraines        Interim History:  At the last visit we discussed an isolated episode of lower back pain, she was otherwise doing well at the  time.     Patient is on the call by herself    She had a lot of struggles after the last round of chemo. She experienced significant nausea and her migraines got triggered again. She had four episodes in the past month, averaging around 4 days each.   She had migraines in the past, which felt similar to the current episodes. During chemo she had a marked decrease in frequency and severity, which has now worsened again. She had been on multiple treatments for migraines without success. She does get some benefit from the Nerivio Band as long as she places it at the very first sign of onset. She had been started on Aimovig right before her cancer was diagnosed and doesn't feel she gave that a sufficient trial. Would be interested in another trial of that while waiting for the appointment with neurology.    She had almost two weeks of marked nausea and fatigue after the most recent chemo resulting in decreased po intake and the need for IVF. Oncology is aware and making adjustments to her premedications. Discussed olanzapine and will add to her prn meds.     Her pain has been well controlled with buprenorphine 10mcg/h patch.       Coping:  She realizes that it will be difficult to find meaningful treatment options for her cancer should the current one not be an option anymore. She would like to continue cancer-directed treatment for as long as possible. She also realizes that at one point the burden of side effects and treatment logistics will outweigh any benefit. She doesn't want to talk about that quite yet, but would like to explore with her oncology team if there are indeed further treatment options available to her     Social History:  Pertinent changes to social history/social situation since last visit: no changes    Key support resources: family    Advance Directive Status:  POLST completed 20: DNR/selective treatments      Impression & Recommendations & Counseliny/o woman with metastatic breast  cancer on single-agent doxil complicated by recurrence of migraines and treatment side effects    Migraines: recurrence of previous migraines.   - Aimovig subcutaneous monthly  - await neurology input  - continue nerivio band    Nausea:  - continue prn zofran, compazine   - await effect of additional pre-chemo antiemetics  - olanzapine 2.5-5mg bid prn    Pain: cancer-related. Well controlled with buprenrophine 10mcg/hr patch    Return to clinic in 4-6 weeks    Data / Chart Review:    Review of Systems:   ROS: 10 point ROS neg other than the symptoms noted above in the HPI and pertinents here.         Physical Exam:   Physical Exam:  Vital Signs not checked, virtual visit    CONSTIT: awake, appears comfortable  EENT: MMM, EOMI, no icterus  RESP: reg, nl effort, no cough  SKIN: no rash, no obvious lesions  NEURO: alert, oriented x3  PSYCH: appropriate affect, memory and thought process intact    The rest of a comprehensive physical examination is deferred  due to public health emergency video visit restrictions.        Current pertinent medications:  Buprenorphine 10mcg/hr patch, weekly  MSIR 15-30mg q3h prn              Naloxone prescribed  acetaminophen 1000mg tid  Ibuprofen 600mg q6h prn  CBD cream  Lidocaine patch     Dexamethasone 4mg q12h     Venlafaxine 225mg daily  Lorazepam 1mg prn  Trazodone 50mg HS  Buspirone 15mg bid     Olanzapine prn after chemo  Promethazine q6h prn  Senna bid prn, miralax bid prn     : ok     Opioid safety assessment:   Expected prognosis >1 year  Risk: Low (ORT 0)  Indication for Opioid Use: Moderate or Strong  Baseline UDT performed on: not sent yet  Naloxone Script provided if patient also on benzodiazepine: 12/4/2019   Indications for Tapering reviewed: good dose decrease with starting buprenorphine     No pertinent allergies         Lab and imaging data reviewed:  Comments:       Video-Visit Details    Type of service:  Video Visit    Physician has received verbal consent for a  Video Visit from the patient? Yes    Video Start Time: 1010    Video End Time (time video stopped): 1048    Originating Location (pt. Location): Home    Distant Location (provider location):  [facility]     Mode of Communication:  [facility]    Alva Whitaker MD  Palliative Medicine  Pager [phone]      Total time spent was 53 minutes. This includes chart review, patient encounter, communication with other providers, orders.     Alva Whitaker MD  Palliative Medicine  Pager [phone]          Again, thank you for allowing me to participate in the care of your patient.        Sincerely,        Alva Whitaker MD

## 2022-05-11 NOTE — PATIENT INSTRUCTIONS
Today:  Follow up as planned- Calendars attached to the back of this after visit summary packet with all of your appoinments.    If you have any questions or concerns please feel free to call.    If you need to reschedule please call:  Clinic or Lab Appointment - [phone]  Infusion - [phone]  Imaging - [phone]    Gayla Baca [facility]   Oncology/Hematology  [facility]  [phone]    After Hours Oncology Nurse Line - [phone]

## 2022-05-12 RX ORDER — OLANZAPINE 2.5 MG/1
2.5-5 TABLET, FILM COATED ORAL 2 TIMES DAILY PRN
Qty: 60 TABLET | Refills: 3 | Status: SHIPPED | OUTPATIENT
Start: 2022-05-12 | End: 2022-11-18

## 2022-05-13 ENCOUNTER — INFUSION THERAPY VISIT (OUTPATIENT)
Dept: INFUSION THERAPY | Facility: CLINIC | Age: 62
End: 2022-05-13

## 2022-05-13 ENCOUNTER — MYC MEDICAL ADVICE (OUTPATIENT)
Dept: ONCOLOGY | Facility: CLINIC | Age: 62
End: 2022-05-13

## 2022-05-13 ENCOUNTER — LAB (OUTPATIENT)
Dept: ONCOLOGY | Facility: CLINIC | Age: 62
End: 2022-05-13
Payer: MEDICARE

## 2022-05-13 VITALS
RESPIRATION RATE: 18 BRPM | SYSTOLIC BLOOD PRESSURE: 130 MMHG | HEART RATE: 86 BPM | DIASTOLIC BLOOD PRESSURE: 80 MMHG | WEIGHT: 134.5 LBS | BODY MASS INDEX: 24.6 KG/M2 | OXYGEN SATURATION: 99 % | TEMPERATURE: 98.3 F

## 2022-05-13 DIAGNOSIS — C50.912 BILATERAL MALIGNANT NEOPLASM OF BREAST IN FEMALE, UNSPECIFIED ESTROGEN RECEPTOR STATUS, UNSPECIFIED SITE OF BREAST (H): ICD-10-CM

## 2022-05-13 DIAGNOSIS — T45.1X5A CHEMOTHERAPY-INDUCED NEUTROPENIA (H): Primary | ICD-10-CM

## 2022-05-13 DIAGNOSIS — D70.1 CHEMOTHERAPY-INDUCED NEUTROPENIA (H): ICD-10-CM

## 2022-05-13 DIAGNOSIS — C50.911 BILATERAL MALIGNANT NEOPLASM OF BREAST IN FEMALE, UNSPECIFIED ESTROGEN RECEPTOR STATUS, UNSPECIFIED SITE OF BREAST (H): ICD-10-CM

## 2022-05-13 DIAGNOSIS — C50.919 METASTATIC BREAST CANCER: ICD-10-CM

## 2022-05-13 DIAGNOSIS — E87.6 HYPOKALEMIA: ICD-10-CM

## 2022-05-13 DIAGNOSIS — C79.51 BONE METASTASES: Primary | ICD-10-CM

## 2022-05-13 DIAGNOSIS — D70.1 CHEMOTHERAPY-INDUCED NEUTROPENIA (H): Primary | ICD-10-CM

## 2022-05-13 DIAGNOSIS — T45.1X5A CHEMOTHERAPY-INDUCED NEUTROPENIA (H): ICD-10-CM

## 2022-05-13 LAB
ALBUMIN SERPL-MCNC: 3.7 G/DL (ref 3.4–5)
ALP SERPL-CCNC: 57 U/L (ref 40–150)
ALT SERPL W P-5'-P-CCNC: 35 U/L (ref 0–50)
ANION GAP SERPL CALCULATED.3IONS-SCNC: 10 MMOL/L (ref 3–14)
AST SERPL W P-5'-P-CCNC: 27 U/L (ref 0–45)
BASOPHILS # BLD AUTO: 0 10E3/UL (ref 0–0.2)
BASOPHILS NFR BLD AUTO: 1 %
BILIRUB SERPL-MCNC: 0.4 MG/DL (ref 0.2–1.3)
BUN SERPL-MCNC: 11 MG/DL (ref 7–30)
CALCIUM SERPL-MCNC: 9.1 MG/DL (ref 8.5–10.1)
CANCER AG27-29 SERPL-ACNC: 3370 U/ML (ref 0–39)
CHLORIDE BLD-SCNC: 104 MMOL/L (ref 94–109)
CO2 SERPL-SCNC: 24 MMOL/L (ref 20–32)
CREAT SERPL-MCNC: 0.68 MG/DL (ref 0.52–1.04)
ELLIPTOCYTES BLD QL SMEAR: SLIGHT
EOSINOPHIL # BLD AUTO: 0 10E3/UL (ref 0–0.7)
EOSINOPHIL NFR BLD AUTO: 1 %
ERYTHROCYTE [DISTWIDTH] IN BLOOD BY AUTOMATED COUNT: 16.1 % (ref 10–15)
GFR SERPL CREATININE-BSD FRML MDRD: >90 ML/MIN/1.73M2
GLUCOSE BLD-MCNC: 178 MG/DL (ref 70–99)
HCT VFR BLD AUTO: 37 % (ref 35–47)
HGB BLD-MCNC: 12.5 G/DL (ref 11.7–15.7)
IMM GRANULOCYTES # BLD: 0 10E3/UL
IMM GRANULOCYTES NFR BLD: 1 %
LYMPHOCYTES # BLD AUTO: 0.6 10E3/UL (ref 0.8–5.3)
LYMPHOCYTES NFR BLD AUTO: 20 %
MCH RBC QN AUTO: 27.8 PG (ref 26.5–33)
MCHC RBC AUTO-ENTMCNC: 33.8 G/DL (ref 31.5–36.5)
MCV RBC AUTO: 82 FL (ref 78–100)
MONOCYTES # BLD AUTO: 0.7 10E3/UL (ref 0–1.3)
MONOCYTES NFR BLD AUTO: 25 %
NEUTROPHILS # BLD AUTO: 1.5 10E3/UL (ref 1.6–8.3)
NEUTROPHILS NFR BLD AUTO: 52 %
NRBC # BLD AUTO: 0 10E3/UL
NRBC BLD AUTO-RTO: 0 /100
PLAT MORPH BLD: ABNORMAL
PLATELET # BLD AUTO: 202 10E3/UL (ref 150–450)
POTASSIUM BLD-SCNC: 3.5 MMOL/L (ref 3.4–5.3)
PROT SERPL-MCNC: 7.1 G/DL (ref 6.8–8.8)
RBC # BLD AUTO: 4.49 10E6/UL (ref 3.8–5.2)
RBC MORPH BLD: ABNORMAL
SODIUM SERPL-SCNC: 138 MMOL/L (ref 133–144)
WBC # BLD AUTO: 2.8 10E3/UL (ref 4–11)

## 2022-05-13 PROCEDURE — 96372 THER/PROPH/DIAG INJ SC/IM: CPT | Mod: 59 | Performed by: NURSE PRACTITIONER

## 2022-05-13 PROCEDURE — 85025 COMPLETE CBC W/AUTO DIFF WBC: CPT | Performed by: NURSE PRACTITIONER

## 2022-05-13 PROCEDURE — 99207 PR NO CHARGE NURSE ONLY: CPT

## 2022-05-13 PROCEDURE — 80053 COMPREHEN METABOLIC PANEL: CPT | Performed by: NURSE PRACTITIONER

## 2022-05-13 PROCEDURE — 99207 PR NO CHARGE LOS: CPT

## 2022-05-13 PROCEDURE — 86300 IMMUNOASSAY TUMOR CA 15-3: CPT | Performed by: NURSE PRACTITIONER

## 2022-05-13 PROCEDURE — 96413 CHEMO IV INFUSION 1 HR: CPT | Performed by: NURSE PRACTITIONER

## 2022-05-13 PROCEDURE — 96367 TX/PROPH/DG ADDL SEQ IV INF: CPT | Performed by: NURSE PRACTITIONER

## 2022-05-13 RX ORDER — HEPARIN SODIUM (PORCINE) LOCK FLUSH IV SOLN 100 UNIT/ML 100 UNIT/ML
500 SOLUTION INTRAVENOUS
Status: DISCONTINUED | OUTPATIENT
Start: 2022-05-13 | End: 2022-05-13 | Stop reason: HOSPADM

## 2022-05-13 RX ORDER — HEPARIN SODIUM (PORCINE) LOCK FLUSH IV SOLN 100 UNIT/ML 100 UNIT/ML
5 SOLUTION INTRAVENOUS
Status: DISCONTINUED | OUTPATIENT
Start: 2022-05-13 | End: 2022-05-13 | Stop reason: HOSPADM

## 2022-05-13 RX ADMIN — HEPARIN SODIUM (PORCINE) LOCK FLUSH IV SOLN 100 UNIT/ML 5 ML: 100 SOLUTION at 13:23

## 2022-05-13 RX ADMIN — HEPARIN SODIUM (PORCINE) LOCK FLUSH IV SOLN 100 UNIT/ML 500 UNITS: 100 SOLUTION at 10:51

## 2022-05-13 NOTE — PROGRESS NOTES
"Patient's name and  were verified.  See Doc Flowsheet - IV assess for details.  IVAD accessed with 20G 3/\" mcnair gripper plus needle  blood return positive: YES  Site without redness, tenderness or swelling: YES  flushed with 20cc NS and 5cc 100u/ml heparin  Needle: left in place for infusion appointment  Comments: Patient's port accessed using sterile procedure. Blood return noted. Lab tubes drawn, labeled and sent to lab. Patient tolerated lab draw well.     Devorah Parham RN, BSN  "

## 2022-05-13 NOTE — PROGRESS NOTES
Infusion Nursing Note:  Shaneka Alvarez presents today for C2D1 Doxil/Xgeva.     Patient seen by provider today: No  Had video visit with Shannan Schilling 5/11   present during visit today: Not Applicable.    Note: Using compazine for nausea. Zofran can give migraines so Shaneka tends to use compazine more.  Will be giving Emend today as well    Intravenous Access:  Implanted Port.    Treatment Conditions:  Lab Results   Component Value Date    HGB 12.5 05/13/2022    WBC 2.8 (L) 05/13/2022    ANEU 1.9 10/26/2021    ANEUTAUTO 1.5 (L) 05/13/2022     05/13/2022      Lab Results   Component Value Date     05/13/2022    POTASSIUM 3.5 05/13/2022    MAG 2.0 04/21/2022    CR 0.68 05/13/2022    RAFAELA 9.1 05/13/2022    BILITOTAL 0.4 05/13/2022    ALBUMIN 3.7 05/13/2022    ALT 35 05/13/2022    AST 27 05/13/2022     Results reviewed, labs MET treatment parameters, ok to proceed with treatment.      Post Infusion Assessment:  Patient tolerated infusion without incident.  Patient tolerated injection without incident.       Discharge Plan:   [facility]  Patient will return 5/17/2022 for next appointment.   Patient discharged in stable condition accompanied by: self.      Bobbi Contreras RN

## 2022-05-21 ASSESSMENT — HEADACHE IMPACT TEST (HIT 6)
HOW OFTEN DID HEADACHS LIMIT CONCENTRATION ON WORK OR DAILY ACTIVITY: VERY OFTEN
WHEN YOU HAVE HEADACHES HOW OFTEN IS THE PAIN SEVERE: ALWAYS
HOW OFTEN HAVE YOU FELT FED UP OR IRRITATED BECAUSE OF YOUR HEADACHES: SOMETIMES
HOW OFTEN DO HEADACHES LIMIT YOUR DAILY ACTIVITIES: VERY OFTEN
HOW OFTEN HAVE YOU FELT TOO TIRED TO WORK BECAUSE OF YOUR HEADACHES: VERY OFTEN
WHEN YOU HAVE A HEADACHE HOW OFTEN DO YOU WISH YOU COULD LIE DOWN: ALWAYS
HIT6 TOTAL SCORE: 69

## 2022-05-22 ENCOUNTER — MYC REFILL (OUTPATIENT)
Dept: ONCOLOGY | Facility: CLINIC | Age: 62
End: 2022-05-22
Payer: MEDICARE

## 2022-05-22 DIAGNOSIS — G89.3 CANCER ASSOCIATED PAIN: ICD-10-CM

## 2022-05-22 DIAGNOSIS — C79.51 BONE METASTASES: ICD-10-CM

## 2022-05-23 RX ORDER — BUPRENORPHINE 10 UG/H
1 PATCH TRANSDERMAL
Qty: 4 PATCH | Refills: 0 | Status: SHIPPED | OUTPATIENT
Start: 2022-05-23 | End: 2022-06-21

## 2022-05-23 ASSESSMENT — MIGRAINE DISABILITY ASSESSMENT (MIDAS)
HOW MANY DAYS IN THE PAST 3 MONTHS HAVE YOU HAD A HEADACHE: 40
ON A SCALE FROM 0-10 ON AVERAGE HOW PAINFUL WERE HEADACHES: 7
HOW MANY DAYS WAS YOUR PRODUCTIVITY CUT IN HALF BECAUSE OF HEADACHES: 30
TOTAL SCORE: 135
HOW OFTEN WERE SOCIAL ACTIVITIES MISSED DUE TO HEADACHES: 30
HOW MANY DAYS DID YOU MISS WORK OR SCHOOL BECAUSE OF HEADACHES: 15
HOW MANY DAYS DID YOU NOT DO HOUSEWORK BECAUSE OF HEADACHES: 30
HOW MANY DAYS WAS HOUSEWORK PRODUCTIVITY CUT IN HALF DUE TO HEADACHES: 30

## 2022-05-23 NOTE — TELEPHONE ENCOUNTER
Received [employer] message from patient requesting refill of buprenorphine.     Last refill: 4/20/22  Last office visit: 5/11/22  Scheduled for follow up 7/6/22     Will route request to MD for review.     Reviewed [place]  Report.

## 2022-05-24 ENCOUNTER — TELEPHONE (OUTPATIENT)
Dept: NEUROLOGY | Facility: CLINIC | Age: 62
End: 2022-05-24

## 2022-05-24 ENCOUNTER — VIRTUAL VISIT (OUTPATIENT)
Dept: NEUROLOGY | Facility: CLINIC | Age: 62
End: 2022-05-24
Attending: NURSE PRACTITIONER
Payer: MEDICARE

## 2022-05-24 DIAGNOSIS — G43.709 CHRONIC MIGRAINE WITHOUT AURA WITHOUT STATUS MIGRAINOSUS, NOT INTRACTABLE: ICD-10-CM

## 2022-05-24 DIAGNOSIS — Z86.59 HISTORY OF CLAUSTROPHOBIA: ICD-10-CM

## 2022-05-24 DIAGNOSIS — G47.00 INSOMNIA, UNSPECIFIED TYPE: ICD-10-CM

## 2022-05-24 DIAGNOSIS — G43.009 MIGRAINE WITHOUT AURA AND WITHOUT STATUS MIGRAINOSUS, NOT INTRACTABLE: ICD-10-CM

## 2022-05-24 DIAGNOSIS — R51.9 WORSENING HEADACHES: Primary | ICD-10-CM

## 2022-05-24 DIAGNOSIS — C50.912 BILATERAL MALIGNANT NEOPLASM OF BREAST IN FEMALE, UNSPECIFIED ESTROGEN RECEPTOR STATUS, UNSPECIFIED SITE OF BREAST (H): ICD-10-CM

## 2022-05-24 DIAGNOSIS — Z85.3 HISTORY OF BREAST CANCER: ICD-10-CM

## 2022-05-24 DIAGNOSIS — C50.911 BILATERAL MALIGNANT NEOPLASM OF BREAST IN FEMALE, UNSPECIFIED ESTROGEN RECEPTOR STATUS, UNSPECIFIED SITE OF BREAST (H): ICD-10-CM

## 2022-05-24 PROCEDURE — 99215 OFFICE O/P EST HI 40 MIN: CPT | Mod: 95 | Performed by: NURSE PRACTITIONER

## 2022-05-24 RX ORDER — LORAZEPAM 0.5 MG/1
TABLET ORAL
Qty: 2 TABLET | Refills: 0 | Status: SHIPPED | OUTPATIENT
Start: 2022-05-24 | End: 2022-07-06

## 2022-05-24 RX ORDER — ERENUMAB-AOOE 70 MG/ML
70 INJECTION SUBCUTANEOUS
Qty: 1 ML | Refills: 11 | Status: SHIPPED | OUTPATIENT
Start: 2022-05-24 | End: 2023-01-05 | Stop reason: DRUGHIGH

## 2022-05-24 NOTE — TELEPHONE ENCOUNTER
Central Prior Authorization Team   Phone: [phone]      PA Initiation    Medication: Ubrelvy 50 mg  Insurance Company:    Pharmacy Filling the Rx: [facility] [address] [address] [facility]  Filling Pharmacy Phone: [phone]  Filling Pharmacy Fax:    Start Date: 5/24/2022

## 2022-05-24 NOTE — PROGRESS NOTES
Shaneka is a 61 year old who is being evaluated via a billable video visit.      How would you like to obtain your AVS? MyChart  If the video visit is dropped, the invitation should be resent by: Send to e-mail at: [email or web address]  Will anyone else be joining your video visit? No      Video Start Time: 12:39 PM  Video-Visit Details    Type of service:  Video Visit    Video End Time:1:19 PM    Originating Location (pt. Location): Home    Distant Location (provider location):  [facility]     Platform used for Video Visit: [facility]     ASSESSMENT AND PLAN:  Recurrent headaches. History of chronic migraine in the past. Improved between 2018 and 2022 until recently 2 months ago recurrent frequent headaches. Headaches similar to previous migraine headaches but given history of metastatic breast cancer it would be not unreasonable to recommend brain MRI and MRV for any secondary causes and interval changes.   Plan:  Headache prevention   Retrial of Aimovig and would need to submit PA for Aimovig.   May try vit B2 200 mg daily OTC  Stay hydrated   Rescue treatment -Ubrelvy as needed for rescue treatment   Brain MRI and MRV for any structural causes of recurring headaches. Lorezapam for claustrophobia 2 mg 30 min before brain MRI. Do not drive.   Follow up after imaging and starting Aimovig 3 months if stable or sooner if needed -MyChart is Ok to communicate about patient's needs.     Subjective   Shaneka is a 61 year old who presents for the following health issues -headache  S/p double mastectomy, s/p chemo and radiation therapy in [id number] and reports that saw her oncologist and will see a genetic counselor  [facility]   relapsed metastatic disease found in skull, vertebrae complicated by pathological fractures L1, L5  Last visit in 2018. Metastatic breast CA.   History of migraine headache.     Had has a full 4 years of migraine headaches in remission.  "Headaches have been reoccurring for about 2 months. No new symptoms and seem like typical migraine headaches. Almost always right sided and light and noise sensitive and motion sickness and nausea.   Headache frequency at least one per week and lasting for 3 days and about half per month.   Current treatment -acetaminophen and morphine  Nerivio  -sometimes it helps if very early    Headache Red Flags:  No systemic symptoms-no fever or chills, no focal neurologic symptoms, no thunderclap onset, no positional or postural component, no precipitation with Valsalva, and no pattern change.     Chronic pain due to cancer and on buprenrophine         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  Venlafaxine helpful for anxiety/depression but not headaches  Naproxen as needed   Propranolol 60 mg ER -unsure of the effectiveness  Buspar for anxiety and headaches  Gabapentin-caused \"stomach sickness\"  Chlorpromazine -for nausea and has been helpful  Tizanidine -\"does not remember\"  bystolic  sumavel  Amitriptyline   Depakote  Verapamil  Petadolax 75 mg twice daily and was not effective  Physical therapy in the past and unsure of the effectiveness  Have not tried Cefaly  Aimovig  for a few month and stopped 3 years ago    Objective    Vitals - Patient Reported  Weight (Patient Reported): 59 kg (130 lb)  Pain Score: Mild Pain (2)  Pain Loc: Low Back    Physical Exam   GENERAL: Healthy, alert and no distress  EYES: Eyes grossly normal to inspection.  No discharge or erythema, or obvious " scleral/conjunctival abnormalities.  RESP: No audible wheeze, cough, or visible cyanosis.  No visible retractions or increased work of breathing.    SKIN: Visible skin clear. No significant rash, abnormal pigmentation or lesions.  NEURO: Cranial nerves grossly intact.  Mentation and speech appropriate for age.  PSYCH: Mentation appears normal, affect normal/bright, judgement and insight intact, normal speech and appearance well-groomed.    Diagnostic Test Results:  Oncology note reviewed  Aug 2019: MRI brain for multiple sclerosis found multiple calvarial lesions suspicious for metastatic disease and no new MS lesions. PET scan with widespread bone metastatic lesions (calvarium, spine, sacrum, pelvis, ribs most prominent at C7, T3, L1 and L4), hypermetabolic 3 cm lesion in LLL, and mediastinal/hilar LN. CEA at 1.9 and C27-29 at 415 (28 on 8/23/16). Biopsy of the right iliac bone was consistent with metastatic adenocarcinoma (breast), ER/AR negative, HER-2 negative PDL1 < 1%. A second biopsy of the LLL nodule via EBUS on 9/5/19 was negative for malignancy.     I discussed all my recommendations with Shaneka Alvarez who verbalizes understanding and comfortable with the plan.  All of patient's questions were answered from the best of my knowledge.  Patient is in agreement with the plan.     49 minutes spent on the date of the encounter doing video access, chart  review, results review,  meds review, treatment plan, documentation and further activities as noted above    GUTIERREZ Larsen, CNP [facility]  Headache certified  [facility]

## 2022-05-24 NOTE — LETTER
5/24/2022       RE: Shaneka Alvarez  [address] [facility] [id number]     Dear Colleague,    Thank you for referring your patient, Shaneka Alvarez, to the [facility] [place] at [facility]. Please see a copy of my visit note below.      ASSESSMENT AND PLAN:  Recurrent headaches. History of chronic migraine in the past. Improved between 2018 and 2022 until recently 2 months ago recurrent frequent headaches. Headaches similar to previous migraine headaches but given history of metastatic breast cancer it would be not unreasonable to recommend brain MRI and MRV for any secondary causes and interval changes.   Plan:  Headache prevention   Retrial of Aimovig and would need to submit PA for Aimovig.   May try vit B2 200 mg daily OTC  Stay hydrated   Rescue treatment -Ubrelvy as needed for rescue treatment   Brain MRI and MRV for any structural causes of recurring headaches. Lorezapam for claustrophobia 2 mg 30 min before brain MRI. Do not drive.   Follow up after imaging and starting Aimovig 3 months if stable or sooner if needed -MyChart is Ok to communicate about patient's needs.     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 Metastatic breast CA.   History of migraine headache.     Had has a full 4 years of migraine headaches in remission. Headaches have been reoccurring for about 2 months. No new symptoms and seem like typical migraine headaches. Almost always right sided and light and noise sensitive and motion sickness and nausea.   Headache frequency at least one per week and lasting for 3  "days and about half per month.   Current treatment -acetaminophen and morphine  Nerivio  -sometimes it helps if very early    Headache Red Flags:  No systemic symptoms-no fever or chills, no focal neurologic symptoms, no thunderclap onset, no positional or postural component, no precipitation with Valsalva, and no pattern change.     Chronic pain due to cancer and on buprenrophine         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  Venlafaxine helpful for anxiety/depression but not headaches  Naproxen as needed   Propranolol 60 mg ER -unsure of the effectiveness  Buspar for anxiety and headaches  Gabapentin-caused \"stomach sickness\"  Chlorpromazine -for nausea and has been helpful  Tizanidine -\"does not remember\"  bystolic  sumavel  Amitriptyline   Depakote  Verapamil  Petadolax 75 mg twice daily and was not effective  Physical therapy in the past and unsure of the effectiveness  Have not tried Cefaly  Aimovig  for a few month and stopped 3 years ago    Objective    Vitals - Patient Reported  Weight (Patient Reported): 59 kg (130 lb)  Pain Score: Mild Pain (2)  Pain Loc: Low Back    Physical Exam   GENERAL: Healthy, alert and no distress  EYES: Eyes grossly normal to inspection.  No discharge or erythema, or obvious scleral/conjunctival abnormalities.  RESP: No audible wheeze, cough, or visible cyanosis.  No visible retractions or increased work of breathing.    SKIN: Visible skin clear. No significant rash, abnormal pigmentation or lesions.  NEURO: Cranial nerves grossly intact.  " Mentation and speech appropriate for age.  PSYCH: Mentation appears normal, affect normal/bright, judgement and insight intact, normal speech and appearance well-groomed.    Diagnostic Test Results:  Oncology note reviewed  Aug 2019: MRI brain for multiple sclerosis found multiple calvarial lesions suspicious for metastatic disease and no new MS lesions. PET scan with widespread bone metastatic lesions (calvarium, spine, sacrum, pelvis, ribs most prominent at C7, T3, L1 and L4), hypermetabolic 3 cm lesion in LLL, and mediastinal/hilar LN. CEA at 1.9 and C27-29 at 415 (28 on 8/23/16). Biopsy of the right iliac bone was consistent with metastatic adenocarcinoma (breast), ER/MI negative, HER-2 negative PDL1 < 1%. A second biopsy of the LLL nodule via EBUS on 9/5/19 was negative for malignancy.     I discussed all my recommendations with Shaneka Alvarez who verbalizes understanding and comfortable with the plan.  All of patient's questions were answered from the best of my knowledge.  Patient is in agreement with the plan.     49 minutes spent on the date of the encounter doing video access, chart  review, results review,  meds review, treatment plan, documentation and further activities as noted above      GUTIERREZ Larsen, CNP [facility]  Headache certified  [facility]

## 2022-05-24 NOTE — TELEPHONE ENCOUNTER
"Prior Authorization Retail Medication Request    Medication/Dose: Ubrelvy 50 mg  ICD code (if different than what is on RX):  Chronic migraine  Previously Tried and Failed:   DHE and toradol about 2-3 times per week and partially effective  Topiramate 200 mg BID and no side effects but word findings difficulties   for a while and was not effective and made patient \"forgetful\"  Protriptyline (Vivactil) outside provider and was not effective  Sumatriptan nasal and injections and all of the triptans (Maxalt, Relpax) were not effective  Botox injections    Was considered for Tysabri and 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  Venlafaxine helpful for anxiety/depression but not headaches  Naproxen as needed   Propranolol 60 mg ER -unsure of the effectiveness  Buspar for anxiety and headaches  Gabapentin-caused \"stomach sickness\"  Chlorpromazine -for nausea and has been helpful  Tizanidine -\"does not remember\"  bystolic  sumavel  Amitriptyline   Depakote  Verapamil  Petadolax 75 mg twice daily and was not effective  Physical therapy in the past and unsure of the effectiveness  Have not tried Cefaly  Aimovig  for a few month and stopped 3 years ago     Rationale:  Chronic migraine    Insurance Name:  Medicare  Insurance ID:  [id number]       Pharmacy Information (if different than what is on RX)  Name:    Phone:    "

## 2022-05-24 NOTE — PATIENT INSTRUCTIONS
Plan:  Headache prevention   Retrial of Aimovig and would need to submit PA for Aimovig.   May try vit B2 200 mg daily OTC  Stay hydrated   Rescue treatment -Ubrelvy as needed for rescue treatment   Brain MRI and MRV for any structural causes of recurring headaches. Lorezapam for claustrophobia 2 mg 30 min before brain MRI. Do not drive.   Follow up after imaging and starting Aimovig 3 months if stable or sooner if needed -MyChart is Ok to communicate about patient's needs.

## 2022-05-24 NOTE — TELEPHONE ENCOUNTER
"Prior Authorization Retail Medication Request    Medication/Dose: Aimovig 70 mg every 30 days  ICD code (if different than what is on RX):  Chronic migraine  Previously Tried and Failed:   DHE and toradol about 2-3 times per week and partially effective  Topiramate 200 mg BID and no side effects but word findings difficulties   for a while and was not effective and made patient \"forgetful\"  Protriptyline (Vivactil) outside provider and was not effective  Sumatriptan nasal and injections and all of the triptans (Maxalt, Relpax) were not effective  Botox injections    Was considered for Tysabri and 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  Venlafaxine helpful for anxiety/depression but not headaches  Naproxen as needed   Propranolol 60 mg ER -unsure of the effectiveness  Buspar for anxiety and headaches  Gabapentin-caused \"stomach sickness\"  Chlorpromazine -for nausea and has been helpful  Tizanidine -\"does not remember\"  bystolic  sumavel  Amitriptyline   Depakote  Verapamil  Petadolax 75 mg twice daily and was not effective  Physical therapy in the past and unsure of the effectiveness  Have not tried Cefaly  Aimovig  for a few month and stopped 3 years ago     Rationale:  Chronic migraine     Insurance Name:  Medicare  Insurance ID:  [id number]         Pharmacy Information (if different than what is on RX)  Name:    Phone:    "

## 2022-05-24 NOTE — TELEPHONE ENCOUNTER
Prior Authorization Approval    Authorization Effective Date: 4/24/2022  Authorization Expiration Date:    Medication: Ubrelvy 50 mg-APPROVED  Approved Dose/Quantity:   Reference #:     Insurance Company:    Expected CoPay:       CoPay Card Available:      Foundation Assistance Needed:    Which Pharmacy is filling the prescription (Not needed for infusion/clinic administered): [facility] [address] [place] [id number] [facility]  Pharmacy Notified: Yes  Patient Notified: No

## 2022-05-26 RX ORDER — TRAZODONE HYDROCHLORIDE 50 MG/1
TABLET, FILM COATED ORAL
Qty: 30 TABLET | Refills: 1 | Status: SHIPPED | OUTPATIENT
Start: 2022-05-26 | End: 2022-08-05

## 2022-05-26 NOTE — TELEPHONE ENCOUNTER
"Requested Prescriptions   Pending Prescriptions Disp Refills    traZODone (DESYREL) 50 MG tablet [Pharmacy Med Name: TRAZODONE 50MG TABLETS] 30 tablet 1     Sig: TAKE 1 TABLET(50 MG) BY MOUTH AT BEDTIME        Serotonin Modulators Passed - 5/24/2022  8:08 AM        Passed - Recent (12 mo) or future (30 days) visit within the authorizing provider's specialty     Patient has had an office visit with the authorizing provider or a provider within the authorizing providers department within the previous 12 mos or has a future within next 30 days. See \"Patient Info\" tab in inbasket, or \"Choose Columns\" in Meds & Orders section of the refill encounter.              Passed - Medication is active on med list        Passed - Patient is age 18 or older        Passed - No active pregnancy on record        Passed - No positive pregnancy test in past 12 months              "

## 2022-06-03 ENCOUNTER — TELEPHONE (OUTPATIENT)
Dept: FAMILY MEDICINE | Facility: OTHER | Age: 62
End: 2022-06-03
Payer: MEDICARE

## 2022-06-03 NOTE — TELEPHONE ENCOUNTER
Patient is scheduled for 06/10/2022 and needs to be rescheduled for in person visit per provider. As her last in person was 02/2021.      Song Hood, HAKANN, RN, PHN  [facility] ~ Registered Nurse  Clinic Triage ~ [place] [facility] [place]  Carlee 3, 2022

## 2022-06-09 ENCOUNTER — PATIENT OUTREACH (OUTPATIENT)
Dept: ONCOLOGY | Facility: CLINIC | Age: 62
End: 2022-06-09

## 2022-06-09 ENCOUNTER — OFFICE VISIT (OUTPATIENT)
Dept: FAMILY MEDICINE | Facility: OTHER | Age: 62
End: 2022-06-09

## 2022-06-09 ENCOUNTER — VIRTUAL VISIT (OUTPATIENT)
Dept: ONCOLOGY | Facility: CLINIC | Age: 62
End: 2022-06-09
Attending: INTERNAL MEDICINE
Payer: MEDICARE

## 2022-06-09 VITALS
RESPIRATION RATE: 16 BRPM | SYSTOLIC BLOOD PRESSURE: 128 MMHG | HEART RATE: 78 BPM | HEIGHT: 62 IN | OXYGEN SATURATION: 100 % | TEMPERATURE: 97.8 F | WEIGHT: 135.5 LBS | DIASTOLIC BLOOD PRESSURE: 76 MMHG | BODY MASS INDEX: 24.93 KG/M2

## 2022-06-09 DIAGNOSIS — C50.912 BILATERAL MALIGNANT NEOPLASM OF BREAST IN FEMALE, UNSPECIFIED ESTROGEN RECEPTOR STATUS, UNSPECIFIED SITE OF BREAST (H): ICD-10-CM

## 2022-06-09 DIAGNOSIS — F41.9 ANXIETY AND DEPRESSION: ICD-10-CM

## 2022-06-09 DIAGNOSIS — C50.911 BILATERAL MALIGNANT NEOPLASM OF BREAST IN FEMALE, UNSPECIFIED ESTROGEN RECEPTOR STATUS, UNSPECIFIED SITE OF BREAST (H): ICD-10-CM

## 2022-06-09 DIAGNOSIS — T45.1X5A CHEMOTHERAPY-INDUCED NEUTROPENIA (H): ICD-10-CM

## 2022-06-09 DIAGNOSIS — C50.919 METASTATIC BREAST CANCER: ICD-10-CM

## 2022-06-09 DIAGNOSIS — L65.9 ALOPECIA: ICD-10-CM

## 2022-06-09 DIAGNOSIS — D70.1 CHEMOTHERAPY-INDUCED NEUTROPENIA (H): ICD-10-CM

## 2022-06-09 DIAGNOSIS — E87.6 HYPOKALEMIA: ICD-10-CM

## 2022-06-09 DIAGNOSIS — Z13.220 ENCOUNTER FOR LIPID SCREENING FOR CARDIOVASCULAR DISEASE: Primary | ICD-10-CM

## 2022-06-09 DIAGNOSIS — C79.51 BONE METASTASES: Primary | ICD-10-CM

## 2022-06-09 DIAGNOSIS — Z13.6 ENCOUNTER FOR LIPID SCREENING FOR CARDIOVASCULAR DISEASE: Primary | ICD-10-CM

## 2022-06-09 DIAGNOSIS — F32.A ANXIETY AND DEPRESSION: ICD-10-CM

## 2022-06-09 DIAGNOSIS — R79.9 ABNORMAL FINDING OF BLOOD CHEMISTRY, UNSPECIFIED: ICD-10-CM

## 2022-06-09 PROCEDURE — 99214 OFFICE O/P EST MOD 30 MIN: CPT | Performed by: PHYSICIAN ASSISTANT

## 2022-06-09 PROCEDURE — 99215 OFFICE O/P EST HI 40 MIN: CPT | Mod: 95 | Performed by: INTERNAL MEDICINE

## 2022-06-09 PROCEDURE — G0463 HOSPITAL OUTPT CLINIC VISIT: HCPCS | Mod: PN,RTG | Performed by: INTERNAL MEDICINE

## 2022-06-09 RX ORDER — ALBUTEROL SULFATE 0.83 MG/ML
2.5 SOLUTION RESPIRATORY (INHALATION)
Status: CANCELLED | OUTPATIENT
Start: 2022-06-10

## 2022-06-09 RX ORDER — NALOXONE HYDROCHLORIDE 0.4 MG/ML
0.2 INJECTION, SOLUTION INTRAMUSCULAR; INTRAVENOUS; SUBCUTANEOUS
Status: CANCELLED | OUTPATIENT
Start: 2022-06-10

## 2022-06-09 RX ORDER — LORAZEPAM 2 MG/ML
0.5 INJECTION INTRAMUSCULAR EVERY 4 HOURS PRN
Status: CANCELLED | OUTPATIENT
Start: 2022-06-10

## 2022-06-09 RX ORDER — HEPARIN SODIUM (PORCINE) LOCK FLUSH IV SOLN 100 UNIT/ML 100 UNIT/ML
5 SOLUTION INTRAVENOUS
Status: CANCELLED | OUTPATIENT
Start: 2022-06-10

## 2022-06-09 RX ORDER — MEPERIDINE HYDROCHLORIDE 25 MG/ML
25 INJECTION INTRAMUSCULAR; INTRAVENOUS; SUBCUTANEOUS EVERY 30 MIN PRN
Status: CANCELLED | OUTPATIENT
Start: 2022-06-10

## 2022-06-09 RX ORDER — ALBUTEROL SULFATE 90 UG/1
1-2 AEROSOL, METERED RESPIRATORY (INHALATION)
Status: CANCELLED
Start: 2022-06-10

## 2022-06-09 RX ORDER — DIPHENHYDRAMINE HYDROCHLORIDE 50 MG/ML
50 INJECTION INTRAMUSCULAR; INTRAVENOUS
Status: CANCELLED
Start: 2022-06-10

## 2022-06-09 RX ORDER — METHYLPREDNISOLONE SODIUM SUCCINATE 125 MG/2ML
125 INJECTION, POWDER, LYOPHILIZED, FOR SOLUTION INTRAMUSCULAR; INTRAVENOUS
Status: CANCELLED
Start: 2022-06-10

## 2022-06-09 RX ORDER — EPINEPHRINE 1 MG/ML
0.3 INJECTION, SOLUTION INTRAMUSCULAR; SUBCUTANEOUS EVERY 5 MIN PRN
Status: CANCELLED | OUTPATIENT
Start: 2022-06-10

## 2022-06-09 RX ORDER — HEPARIN SODIUM,PORCINE 10 UNIT/ML
5 VIAL (ML) INTRAVENOUS
Status: CANCELLED | OUTPATIENT
Start: 2022-06-10

## 2022-06-09 ASSESSMENT — PATIENT HEALTH QUESTIONNAIRE - PHQ9
SUM OF ALL RESPONSES TO PHQ QUESTIONS 1-9: 4
SUM OF ALL RESPONSES TO PHQ QUESTIONS 1-9: 4
10. IF YOU CHECKED OFF ANY PROBLEMS, HOW DIFFICULT HAVE THESE PROBLEMS MADE IT FOR YOU TO DO YOUR WORK, TAKE CARE OF THINGS AT HOME, OR GET ALONG WITH OTHER PEOPLE: SOMEWHAT DIFFICULT

## 2022-06-09 ASSESSMENT — PAIN SCALES - GENERAL: PAINLEVEL: NO PAIN (0)

## 2022-06-09 NOTE — PROGRESS NOTES
"  Assessment & Plan     Encounter for lipid screening for cardiovascular disease  Patient has been trying to stay active with biking as well as light activity. She is overdue for lipid profile. As she will be having labs drawn tomorrow prior her next chemo treatment I will place this as a future order.  - Lipid Profile (Chol, Trig, HDL, LDL calc); Future    Metastatic breast cancer (H)  Abnormal finding of blood chemistry, unspecified   Blood glucose elevated at 178 on most recent CMP from 05/13/22. Patient does not believe that she was fasting and says that she otherwise has been following healthy diet. Will have them check A1c to rule out development of diabetes. I did discuss updating vaccines including shingles, TDAP and pneumonia. Patient declined. Advised she coordinate with oncologist a safe time to receive these.   - Hemoglobin A1c; Future    Chemotherapy-induced neutropenia (H)  This is being followed by oncologist. Will defer to this specialist for further management.     Anxiety and depression  Feels that her mood is well controlled on current regimen. Denies thoughts of SI/HI.    Return in about 4 months (around 10/9/2022) for For pelvic exam.    [facility] is a 62 year old who presents for the following health issues     History of Present Illness       Headaches:   Since the patient's last clinic visit, headaches are: worsened  The patient is getting headaches:  2-3x/ wk  She is not able to do normal daily activities when she has a migraine.  The patient is taking the following rescue/relief medications:  Other   Patient states \"I get some relief\" from the rescue/relief medications.   The patient is taking the following medications to prevent migraines:  No medications to prevent migraines  In the past 4 weeks, the patient has gone to an Urgent Care or Emergency Room 1 time times due to headaches.    Reason for visit:  Renew " "prescriptions    She eats 2-3 servings of fruits and vegetables daily.She consumes 0 sweetened beverage(s) daily.She exercises with enough effort to increase her heart rate 20 to 29 minutes per day.  She exercises with enough effort to increase her heart rate 5 days per week.   She is taking medications regularly.    Today's PHQ-9         PHQ-9 Total Score: 4    PHQ-9 Q9 Thoughts of better off dead/self-harm past 2 weeks :   Not at all    How difficult have these problems made it for you to do your work, take care of things at home, or get along with other people: Somewhat difficult       Annual Wellness Visit    Patient has been advised of split billing requirements and indicates understanding: Yes     Are you in the first 12 months of your Medicare Part B coverage?  Yes,  Visual Acuity:  Right Eye: 20/50   Left Eye: 20/40  Both Eyes: 20/40    Physical Health:    In general, how would you rate your overall physical health? poor    Outside of work, how many days during the week do you exercise?4-5 days/week    Outside of work, approximately how many minutes a day do you exercise?15-30 minutes    If you drink alcohol do you typically have >3 drinks per day or >7 drinks per week? Not Applicable    Do you usually eat at least 4 servings of fruit and vegetables a day, include whole grains & fiber and avoid regularly eating high fat or \"junk\" foods? Yes    Do you have any problems taking medications regularly? No    Do you have any side effects from medications? none    Needs assistance for the following daily activities: no assistance needed    Which of the following safety concerns are present in your home?  lack of grab bars in the bathroom     Hearing impairment: No    In the past 6 months, have you been bothered by leaking of urine? no    Mental Health:    In general, how would you rate your overall mental or emotional health? good  PHQ-2 Score:      Do you feel safe in your environment? Yes    Have you ever done " "Advance Care Planning? (For example, a Health Directive, POLST, or a discussion with a medical provider or your loved ones about your wishes)? Yes, patient states has an Advance Care Planning document and will bring a copy to the clinic.    Fall risk:   No falls    Cognitive Screenin) Repeat 3 items (Leader, Season, Table)    2) Clock draw: NORMAL  3) 3 item recall: Recalls 3 objects  Results: 3 items recalled: COGNITIVE IMPAIRMENT LESS LIKELY    Mini-CogTM Copyright YEN Rojas. Licensed by the author for use in [facility]; reprinted with permission (toi@[facility]). All rights reserved.          Current providers sharing in care for this patient include:   Patient Care Team:  [facility] [place] as PCP - General  Crissy Goodwin MD as MD (Family Practice)  Caio Quiles MD as MD (Neurology)  Dewayne Zepeda MD as MD (Hematology & Oncology)  Antonietta Arana, TERRI as Specialty Care Coordinator (Oncology)  Gina Pineda as   Dewayne Zepeda MD as Assigned Cancer Care Provider  Alva Whitaker MD as Assigned Palliative Care Provider  Ralph Hu MD as Assigned Surgical Provider  Luis Barkley MD as Assigned Surgical Provider  Spenser Judd PA-C as Assigned PCP  Emelia Mixon APRN CNP as Nurse Practitioner (Neurology)    Patient has been advised of split billing requirements and indicates understanding: Yes      Review of Systems   Constitutional, HEENT, cardiovascular, pulmonary, gi and gu systems are negative, except as otherwise noted.      Objective    /76 (BP Location: Left arm, Patient Position: Sitting, Cuff Size: Adult Regular)   Pulse 78   Temp 97.8  F (36.6  C)   Resp 16   Ht 1.575 m (5' 2\")   Wt 61.5 kg (135 lb 8 oz)   SpO2 100%   Breastfeeding No   BMI 24.78 kg/m    Body mass index is 24.78 kg/m .  Physical Exam   GENERAL: healthy, alert and no distress  EYES: Eyes grossly normal to inspection, " PERRL and conjunctivae and sclerae normal  HENT: ear canals and TM's normal, nose and mouth without ulcers or lesions  NECK: no adenopathy, no asymmetry, masses, or scars and thyroid normal to palpation  RESP: lungs clear to auscultation - no rales, rhonchi or wheezes  CV: regular rate and rhythm, normal S1 S2, no S3 or S4, no murmur, click or rub, no peripheral edema and peripheral pulses strong  MS: no gross musculoskeletal defects noted, no edema  NEURO: Normal strength and tone, mentation intact and speech normal  PSYCH: mentation appears normal, affect normal/bright    Future labs ordered.

## 2022-06-09 NOTE — LETTER
6/9/2022         RE: Shaneka Alvarez  [id number] [facility] [address]        Dear Colleague,    Thank you for referring your patient, Shaneka Alvarez, to the [facility]. Please see a copy of my visit note below.    Patient denies any changes since echeck-in regarding medication and allergies and states all information entered during echeck-in remains accurate.    Shaneka is a 62 year old who is being evaluated via a billable video visit.      How would you like to obtain your AVS? MyChart  If the video visit is dropped, the invitation should be resent by: Text to cell phone: [phone]  Will anyone else be joining your video visit? No       Belkys JOE    Video Start Time: 10:23 AM  Video-Visit Details    Type of service:  Video Visit    Video End Time:10:33 AM    Originating Location (pt. Location): Home    Distant Location (provider location):  [facility]     Platform used for Video Visit: Thom    ONCOLOGY FOLLOW UP NOTE: 6/09/22        I took the history from reviewing the previous notes that she was following with Dr. Amaya.  I have copied and updated from prior notes.    ONCOLOGY History:    1. January 2006:  Diagnosed with Stage IIB, T2 N1 M0 invasive lobular carcinoma of the right breast.  Final pathology showed a 4.5 x 3.8 x 2.5 cm, 01/14 lymph nodes positive.  Estrogen, progesterone receptor positive, HER-2 negative.     2.  Genetics.  BRCA1 and 2 mutations not detected. 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       THERAPY TO DATE:   1. 2006:  ECOG 2104 protocol of dose-dense Adriamycin, Cytoxan and Avastin x4 cycles followed by Taxol and Avastin x4 cycles.   2.  03/2007:  Completed 1 year Avastin.   3.  11/2006-01/2007:  Radiotherapy to the right breast of 5040 cGy.   4.  03/[id number]:  Aromasin.  Stopped after moving to the [facility].   5.  01/2016:  Right modified radical mastectomy with latissimus dorsi flap  reconstruction and a left prophylactic mastectomy with latissimus dorsi flap reconstruction.     She recently had MRI of the brain as a follow-up for multiple sclerosis and there were multiple calvarial lesions noted which was suspicious for metastatic disease.  There was no evidence of new multiple sclerosis lesions.  She had a PET scan on 8/30/2019 which showed widespread bone metastatic lesions (calvarium, spine, sacrum, pelvis, ribs most prominent at C7, T3, L1 and L4), hypermetabolic 3 cm lesion in LLL, and mediastinal/hilar LN. CEA wnl at 1.9 and C27-29 elevated to 415 (28 on 8/23/16). A CT guided biopsy of the right iliac bone was obtained on 9/4/19, pathology consistent with metastatic adenocarcinoma (breast), ER/NH negative, HER-2 negative PDL 1 < 1%. A second biopsy was take of the LLL nodule via EBUS on 9/5/19 did not show any malignancy.    C/T/L spine MRI 8/3/19 to 9/2/19 with numerous enhancing lesions of the C, T and L spine, largest around T9 and L1. No evidence of cord compression. Has a L1 compression fracture and impending L4.     Received radiation T12-L5 3000 cGy in 10 fractions from 9/6/2019 till 9/18/2019.  Neurosurgery recommended no surgical intervention but to wear [place] brace when out of bed and HOB >30 degrees    She started palliative Xeloda 2000/1500 on 10/1/2019 but after just a few doses she noticed nausea, red eyes blurred vision, loss of appetite.  She stopped taking it after 5 doses and was seen by nurse practitioner on 10/7/2019 when she was improving.  At that point she was restarted on Xeloda at a lower dose of 1000 mg twice a day.  As she was not able to tolerate even the lower dose with extreme nausea and vomiting and feeling extremely fatigued and blurry vision, we decided on stopping Xeloda.    We decided on repeating scans and MRI brain on 10/25/2019 showed no intracranial metastatic disease.   Multiple enhancing calvarial lesions may be increased in number and are  suggestive of metastatic disease. Thinner imaging on the current study may identify the smaller lesions and may be responsible for  identified more lesions.   There is a single focus of T2 hyperintense signal within the anterior right temporal lobe may represent interval demyelination. There is no evidence for active demyelination.    PET/CT on 11/1/2019 showed favorable response to therapy and Overall FDG uptake within scattered osseous metastases is decreased since 8/30/2019, particularly within the pelvis. Increased sclerotic appearance of L1 and L4 pathologic compression deformities, likely sequela of recently completed palliative radiation therapy.    No significant FDG uptake in previously demonstrated hypermetabolic mediastinal lymph nodes. Biopsy negative on 9/5/2019.  There is also decreased FDG uptake in the left lower lobe (biopsy negative for  malignancy), now with dense consolidation containing air bronchograms suggestive of infection versus aspiration.  There is diffuse FDG uptake within the esophagus, consistent with esophagitis.    Because of intolerance to Xeloda we decided on switching to weekly paclitaxel on 11/5/2019.    C#2 Taxol 12/4/19- started with 70mg/m2 dose reduction    C#3 Taxol 12/30/2019     PET/CT on 1/24/2020 showed progression of the disease in some of the bone lesions including increase in size and lytic lesion within the vertebral body of C4 measuring 1 cm as opposed to 0.6 cm previously and a new central soft tissue nodule with SUV max 4.1 when previously it was non-hypermetabolic.  There is also some progression noted in the right proximal femur and right iliac bone.  There is decreased metabolic uptake in the right lamina of T9 with SUV max 4.7 when previously it was 8.0.  Other lesions are stable.    CA 27-29 also had increased significantly and it was 257 on 1/28/2020.    We decided on switching to eribulin on 1/28/2020.    C#2 2/18/2020  C#2 D#8 2/25/2020    C#3 D#1  3/18/2020 -delayed by 1 week as per patient's preference because she wanted to visit her family.    She had a repeat PET/CT on 3/27/2020 which showed stable size of the bone metastatic lesions although the FDG avidity was less, consistent with treatment response.    She had MRI of the thoracic spine on 4/3/2020 and it was compared to the one which was done in August 2019 and it showed increased size of several metastatic lesions most notably at T9 but also at T5-T7.  Other lesions at T10, T11 and T12 appeared improved.    CA 27-29 was also down to 222 on 3/18/2020.    Cycle #4 eribulin ( dose reduced to 1.2 mg/m2 )-4/7/2020-   Cycle #5 Eribulin ( 1.2 mg/m2 )- 4/28/2020   Cycle #6 Eribulin ( 1.2 mg/m2 )- 5/19/2020     Cycle #7 Eribulin ( 1.2 mg/m2 )- 6/9/2020    Cycle #8 Eribulin ( 1.2 mg/m2 )- 6/30/2020     She had a repeat PET scan on 7/17/2020 which showed incidental finding of new acute bilateral pulmonary embolism in the distal left main pulmonary artery extending in the left upper and lower lobes and in the segmental and branch arteries in the right lower lobe.    It also showed mildly increased FDG avidity in the lytic lesion in the T9 lamina and right pedicle with SUV max 5.3, previously 3.9.  That is also slightly increased FDG uptake to the left anterior fifth rib at the costochondral junction SUV max 3.9, previously 2.5.  There is slightly decreased FDG uptake in the right femoral neck lesion SUV max 2.2, previously 2.9.  FDG uptake in other bone lesions is fairly stable.  No new metastasis are seen.    CA 27-29 was 308 on 6/30/2020.  It was 286 on 5/19/2020.    Overall this is consistent with only slight progression versus stable disease.    She was started on Lovenox.    Cycle #9 eribulin 7/21/2020. CA 27-29 was 238    C#10 eribulin 8/18/2020. ( delayed by one week for ANC 0.9 )    C#11 Eribulin 9/8/2020    C#12 Eribulin 9/29/2020     C#13 Eribulin 10/20/2020     PET scan on 11/6/2020 shows progression  of the disease with increased FDG uptake in the lytic lesions in the T9/T10 and right posterolateral elements as well as increased FDG uptake in the previously known hypermetabolic right iliac bone lesion suggesting recurrence of previously treated metastasis.  There is new subtle lesion in the right manubrium.  There is also a new fracture in the anterolateral left fourth rib with associated uptake and this could be a pathologic fracture.  Healing fracture in the left anterior fifth rib lesion.  There is resolution of the left pulmonary emboli.  Decreased FDG uptake of the esophagus consistent with improving inflammation.    CA 27-29 on 10/20/2020 was also elevated at 489.    C#1 Trodelvy on 11/11/2020.  Cycle #2 Trodelvy 12/2/2020  Cycle number 2-day #8 Trodelvy 12/8/2020.    12/15/2020-12/16/2020.  She was admitted to the hospital with nausea vomiting and dehydration.  She had tachycardia and initial lactic acid of 5.  It decreased to 1.4 after 2 L of normal saline.  She also had hypokalemia and ANC was 1.3.  She was treated with IV fluids.  Procalcitonin was negative.  CTA of the chest was negative for pulmonary embolism or pneumonia.  No bacterial infection was documented.  Her medication which she was initially unable to tolerate at home, were restarted and she was discharged home once started to feel better.      We delayed the start of cycle number 3 x 1 week and decrease the dose of chemotherapy by 25%.  She also had neutropenia with ANC of 1.0.      She started cycle number 3-day #1 on 12/29/2020.  CA 27-29 was 392.    Cycle number 3-day #8 1/5/2021.    C#4 Trodelvy 1/20/2021- 75% dose    2/5/2021.  PET/CT overall shows a good response to treatment with improvement of previously hypermetabolic lesions in the spine and pelvis and stability of other bone meta stasis.  There is a single lytic lesion in the right iliac bone which demonstrates slight Yimi increased FDG uptake and has SUV max 4.7 while previously  it was SUV max 3.4.  There was diffuse wall thickening of the esophagus with uptake in the esophagus in the fundus of the stomach and this could be seen with inflammation.    Trodelvy cycle #5 - 2/10/2021.  75% of the dose.  CA 27-29 was 337.    Trodelvy cycle #6 - 3/3/2021.  75% of the dose.  Trodelvy cycle #6, D#8 - 3/10/2021.  75% of the dose.    Trodelvy C#8, D#8 on 4/21/2021  CA 27-29 stable at 371 on 4/14/2021    Trodelvy, cycle #9- 5/5/2021        On 2/25/2020 when she had biopsy of the right acetabulum and it was consistent with metastatic lobular carcinoma.  Again it was Triple Negative.     On 3/18/2020 when she also met with Dr. Arelis Arshad who also advised testing for androgen receptor studies on the biopsy specimen.  Tumor was diffusely androgen receptor positive.      5/26/2021-cycle #10 Trodelvy started    CA 27-29 was 545.    6/11/2021.  PET/CT shows mildly increased uptake in several bone lesions for example in the left anterior iliac crest, mid right iliac crest and left ischium.  Uptake in other bone lesions are similar to previously.    Because of minimal progression of the disease and she is tolerating chemotherapy very well, we decided on continuing the same chemotherapy.    6/16/2021-Trodelvy cycle #11    7/7/2021 -Trodelvy cycle #12    9/14/2021-Trodelvy-cycle #15, day #8.    9/8/2021-CA 27-29 was more elevated and it was 1176.    PET/CT on 9/24/2021 showed stable findings with no evidence of FDG avid metastatic disease within the body.  Left axillary lymph nodes are prominent and hypermetabolic and likely from recent vaccinations in the left deltoid muscle.  Healed bony metastasis seems stable.      Cycle #16, Trodelvy 9/28/2021.  Cycle #17, day #8 Trodelvy was on 10/26/2021.  Cycle #18, day #8 Trodelvy on 11/22/2021       She saw Dr. Mejia whom on 10/6/2021.  She was not considered eligible for Enfortumab trial.    Cycle #19, Trodelvy on 12/7/2021 12/21/2021.  PET/CT showed progression  of bony metastatic disease.  There is increase hypermetabolism in both the right and left iliac bones and the sternum.  Other bone lesions are stable.    There is new scattered areas of groundglass throughout both the lungs and these findings are indeterminate but may represent an infectious etiology.  Interval resolution of left axillary FDG avid lymphadenopathy.    She was started on Casodex 150 mg daily on 1/6/2022.        She was admitted to the hospital from 3/17/2022-3/19/2022 for vomiting and diarrhea and  severe back pain.  I reviewed the discharge summary.  CT lumbar spine showed a stable severe chronic L1 pathologic compression fracture.  Stable mild compression deformity of superior endplate of L3 and superior endplate of L4.  The upper margin of the L4 fracture extends slightly into the spinal canal without high-grade canal stenosis and this is also stable.  Nondisplaced fractures coursing through the L3 pedicles bilaterally were seen which were not clearly seen previously this could be acute and likely pathologic.  No extraspinal soft tissue abnormality.  Neurosurgery recommended conservative management.  Her pain was controlled and she was discharged home as she felt better with this.      3/31/2022-PET/CT shows progressive bone meta stasis with progressive FDG uptake in the following lesions. Left iliac crest lesion with max SUV of 7.2, previously 3.5. Right mid iliac crest lytic lesion shows maximum SUV of 7.8, previously 6.9.  T10 vertebral body mixed lytic and sclerotic lesion with an SUV of 7.1, previously not hypermetabolic. Thoracic vertebral bodies 8, 7, 5, and 4 also show hypermetabolic lesions were previously there was no hypermetabolism.  Some of the sclerotic osseous lesions are unchanged and do not show increased hypermetabolism compatible treated lesion such as a severe compression deformity at L1 as well as superior compression deformity at L4.  Medial right clavicle sclerotic lesion  with SUV max of 4.8, previously not hypermetabolic. There is also right-sided sternal lesions.      4/14/2022--C#1- switch to single agent Doxil 50 mg per metered squared every 4 weeks.    5/13/2022-cycle #2- Doxil- dose reduced to 40 mg per metered square due to severe nausea/vomiting and migraines requiring IV fluids and IV antiemetics.        Interval history.  This is a video visit.    She tolerated the last infusion the video visit.  She had mild nausea.  No diarrhea constipation.  Overall energy has been decent.  No fevers or infection.  Migraines have been better.  Saw Dr Mixon from Neurology for migraines and now takes Ubrelvy for breakthrough pain. Aimovig requires pre-authorization. I reviewed the notes from neurology.  Cancer related pain in her low back is under good control.  She is following with palliative care.  She has mild neuropathy. She needs a prescription for wig for alopecia.    ECOG 1    ROS:  Rest of the comprehensive review of the system was negative.        I reviewed other history in epic as below.       PAST MEDICAL HISTORY:     1.  Breast cancer  2.  Multiple sclerosis.   3.  Depression.   4.  Migraines.   5.  Hypertension.   6.  Cholecystectomy and umbilical hernia repair.     SOCIAL HISTORY: She smoked for 5 years but quit many years ago.  Drinks alcohol socially.  She lives with her .  She is a teacher in middle school.       FAMILY HISTORY: She mentions that her mother had breast cancer at 49.  Both grandmothers had breast cancer but she is not sure of the age.  A couple of cousins have cancers.  Patient has 3 children who are healthy.    Current Outpatient Medications   Medication     acetaminophen (TYLENOL) 500 MG tablet     buprenorphine (BUTRANS) 10 MCG/HR WK patch     busPIRone (BUSPAR) 15 MG tablet     calcium citrate-vitamin D (CITRACAL) 315-250 MG-UNIT TABS     Cholecalciferol (VITAMIN D3 PO)     ELIQUIS ANTICOAGULANT 5 MG tablet     erenumab-aooe (AIMOVIG) 70 MG/ML  injection     ibuprofen (ADVIL/MOTRIN) 600 MG tablet     Lidocaine (LIDOCARE) 4 % Patch     loratadine (CLARITIN) 10 MG tablet     LORazepam (ATIVAN) 0.5 MG tablet     LORazepam (ATIVAN) 1 MG tablet     magnesium 250 MG tablet     morphine (MSIR) 15 MG IR tablet     Nerve Stimulator (NERIVIO) CRISTOPHER     OLANZapine (ZYPREXA) 2.5 MG tablet     ondansetron (ZOFRAN-ODT) 8 MG ODT tab     prochlorperazine (COMPAZINE) 10 MG tablet     propranolol ER (INDERAL LA) 80 MG 24 hr capsule     senna-docusate (SENOKOT-S/PERICOLACE) 8.6-50 MG tablet     traZODone (DESYREL) 50 MG tablet     ubrogepant (UBRELVY) 50 MG tablet     venlafaxine (EFFEXOR-XR) 75 MG 24 hr capsule     No current facility-administered medications for this visit.        Allergies   Allergen Reactions     Bactrim [Sulfamethoxazole W/Trimethoprim]      Internal bleeding     Copaxone [Glatiramer] Hives          PHYSICAL EXAMINATION:     There were no vitals taken for this visit.  Wt Readings from Last 4 Encounters:   06/09/22 61.5 kg (135 lb 8 oz)   05/13/22 61 kg (134 lb 8 oz)   04/21/22 59.6 kg (131 lb 8 oz)   04/14/22 62.1 kg (136 lb 12.8 oz)           Constitutional.  Looks well and in no apparent distress.   Eyes.  Without eye redness or apparent jaundice.   Respiratory.  Non labored breathing. Speaking in full sentences.    Skin.  No concerning skin rashes on the skin visualized.   Neurological.  Is alert and oriented.  Psychiatric.  Mood and affect seem appropriate.      The rest of a comprehensive physical examination is deferred due to Public Health Emergency video visit restrictions.        Labs and Imaging.    Reviewed.    5/13/2022.  CBC showed WBC 2.8.  Hemoglobin 12.5.  Platelets 202.  Neutrophil count 1.5  CMP unremarkable    CA 27-29 was 3370 on 5/13/2022.  It was 5307 on 4/14/2022 ferritin Doxil was started.      CA 27-29 was 3146 on 3/1/2022.  This continues to increase.    9/28/2021.      Iron studies, ferritin is 101, iron 51, TIBC 268 and iron  saturation index 19%.        3/31/2022-PET/CT shows progressive bone metastasis with progressive FDG uptake in the following lesions. Left iliac crest lesion with max SUV of 7.2, previously 3.5. Right mid iliac crest lytic lesion shows maximum SUV of 7.8, previously 6.9.  T10 vertebral body mixed lytic and sclerotic lesion with an SUV of 7.1, previously not hypermetabolic. Thoracic vertebral bodies 8, 7, 5, and 4 also show hypermetabolic lesions were previously there was no hypermetabolism.  Some of the sclerotic osseous lesions are unchanged and do not show increased hypermetabolism compatible treated lesion such as a severe compression deformity at L1 as well as superior compression deformity at L4.  Medial right clavicle sclerotic lesion with SUV max of 4.8, previously not hypermetabolic. There is also right-sided sternal lesions.      Biopsy from the right acetabulum shows metastatic lobular carcinoma.  It is triple negative.  Androgen receptor was diffusely positive (90%, moderate intensity) by immunohistochemistry. )  PD-L1 negative.    Foundation one showed CDH1 mutation (initially lobular cancer), CCND1 amplification ( equivocal ). FGF3, FGF4, FGF19 amplification ( Equivocal ). Most promising is CCND1 amplification with abemaciclib showing response in 4/10 patients. FGF3,FGF4 and FGF19 are targetable with pan-FGFR inhibitors.           ASSESSMENT AND PLAN:     Metastatic breast cancer negative breast cancer (androgen receptor positive ) with extensive involvement of the bones.  There is also a left lower lobe hypermetabolic lesion and mediastinal lymph nodes which are hypermetabolic.  The biopsy from the right iliac bone is consistent with metastatic lobular breast cancer but it is hormone receptor and HER-2 negative and PDL 1 is also negative.    Foundation 1 testing did not reveal any actionable mutations.    She was intolerant to Xeloda and progressed on Taxol and eribulin.      We then switched to  recently FDA approved sacituzumab govitecan-hziy (Trodelvy) for TNBC, based on the results of the phase II IMMU-132-01 clinical trial.   She started this on 11/11/2020.      She obtained a [facility] who recommended a repeat biopsy to be done to make sure that she really has triple negative breast cancer.      She also met with Dr. Arelis Arshad on 3/18/2021.      After 10 cycles of Trodelvy, she had PET/CT on 6/11/2021 which showed mildly increased uptake in some of the bone lesions while stability in other bone lesions.        After 15 cycles, repeat PET scan showed stable findings.  CA 27-29 has been increasing and it is 1176.      As she was tolerating the chemotherapy well and her quality of life has been decent and scans were stable although she had a rising CA 27-29, we decided on continuing the same chemotherapy because it has been a very slow progression of the disease.    Now there is clear progression of the disease in the bones.  There is increased FDG uptake in both right and left iliac bones and the sternum.    Foundation one showed CDH1 mutation (initially lobular cancer), CCND1 amplification ( equivocal ). FGF3, FGF4, FGF19 amplification ( Equivocal ). Most promising is CCND1 amplification with abemaciclib showing response in 4/10 patients. FGF3,FGF4 and FGF19 are targetable with pan-FGFR inhibitor    As the tumor is diffusely positive for androgen receptor, we we decided to start her on androgen receptor blockers.    I initially recommended enzalutamide 160 mg daily ( Enzalutamide for the Treatment of Androgen Receptor-Expressing Triple-Negative Breast Cancer----J Clin Oncol. 2018 Mar 20; 36(9): 884-890. ).    Eventually we decided to start her on Casodex 150 mg daily instead of Enzalutamide due to cost issues.  Clinical Trial Clin Cancer Res. 2013 Oct 1;19(19):9240-12.   Phase II trial of bicalutamide in patients with androgen receptor-positive,  estrogen receptor-negative metastatic Breast Cancer  She started Casodex on 1/6/2022.    She had progressive disease in the bones on the PET scan on 3/31/2022.    We changed to single agent Doxil on 4/14/2022.      She developed significant nausea vomiting and headache so cycle #2 was given on 5/13/2022 with 20% dose reduction.    She tolerated cycle #2 video visit.    We will proceed with cycle #3 on 6/10/2022 and we will repeat PET scan after that.    Last CA 27-29 was coming down.  We will serially monitor.    Baseline echocardiogram on 4/7/2022 was unremarkable with EF 60 to 65%.    Bone disease.  She has metastatic disease to the bone.  Previously she got palliative radiation to T12-L5 3000 cGy in 10 fractions from 9/6/2019 till 9/18/2019.  She was evaluated by orthopedics Dr. Bipin Elliott on 11/1/2019 and conservative management was recommended.    She was on Zometa every 3 months. She last received on 9/29/2020.  Because of progression of the disease in the bones, we switched to Xgeva which she received on 11/11/2020.    She continues to show progression in the bones.  We are continuing with monthly Xgeva.  Continue vitamin D and calcium.  Repeat PET scan in 1 month.       Leukopenia/neutropenia.  From chemotherapy.  Last time chemotherapy was dose reduced by 20%.  Repeat labs tomorrow.      Pain management. Now better controlled on buprenorphine patch and she takes occasional morphine.  Following with palliative care.     Migraines.  Much better now with as needed as needed Ubrelvy for breakthrough pain. Aimovig requires pre-authorization. I reviewed the notes from neurology.      Nausea.  This was also under much better controlled with this cycle.  She takes antiemetics as needed.      Bilateral pulmonary emboli.  Tolerating Eliquis for incidentally found PE. Cont the same    Alopecia.  She needs a prescription for wig/cranial prosthesis.  I gave it to her.      We did not address the following  today.    Constipation. Continue senna.    Neuropathy.  This remains mild and stable with neuropathy in her hands.    This is in addition to the chronic balance issues and heat and cold sensitivity from underlying multiple sclerosis which is also stable for years.  Continue to monitor.     Subcutaneous nodule in the scalp.  This looks like an epidermoid cyst.  She thinks it is a stable.  Continue to monitor.       Insomnia.  Continue trazodone.      Wall thickening of esophagus and FDG uptake of fundus of the stomach.  It could be in the setting of inflammation from recent nausea and vomiting.  Symptoms have resolved.  Continue to monitor clinically.    Vision changes.    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  It was thought that this could be due to Taxol as patient reported to the ophthalmologist that she was on Taxol in September 2019 when her symptoms started.  But she was not on Taxol in September 2019 when she started noticing the visual changes so Taxol is not responsible for this.  The first dose of Taxol was on 11/5/2019.   She had left cataract extraction on 2/8/2021 and she has noticed significant improvement in her vision.       Increased FDG ability in the colon.  She had FDG uptake in the cecum and ascending colon but no corresponding lesion was seen on the CT scan.  She is completely asymptomatic so at this time we will continue to observe.    Discussion regarding healthcare directives.  On previous visit she told me that she will bring the health care directive for our records but she forgot so I told her to bring it on next visit.      Breast implant removal.  She tells me that she was planning to do breast implant removal because there was a recall on this.  Her surgery was scheduled for 9/24/2019.  Because of this new development of metastatic breast cancer and her recent radiation, surgery has been canceled.  We discussed that at this time treatment for  metastatic breast cancer would take precedence over the other surgery as the other surgery would require her to be off chemotherapy for several weeks and in that case the chances of cancer progression would be high and I would not recommend that.    Multiple sclerosis.  She will continue to follow with her neurologist Dr. Quiles.  Currently multiple sclerosis is under control and currently she is not taking any medications.    See me back before cycle #4    All of her questions were answered to her satisfaction.  She is agreeable and comfortable with the plan.        Again, thank you for allowing me to participate in the care of your patient.      Sincerely,    Dewayne Zepeda MD

## 2022-06-09 NOTE — PROGRESS NOTES
Returned call to patient and left a VM as she is requesting a new patient consult to have Dr Blackwell assume her care for her breast cancer. Left message to call new patient scheduling and left the telephone number to arrange the consult with Dr Blackwell. Bess Ramsay RN, BSN [facility] Nurse Coordinator

## 2022-06-09 NOTE — PROGRESS NOTES
Patient denies any changes since echeck-in regarding medication and allergies and states all information entered during echeck-in remains accurate.    Shaneka is a 62 year old who is being evaluated via a billable video visit.      How would you like to obtain your AVS? MyChart  If the video visit is dropped, the invitation should be resent by: Text to cell phone: [phone]  Will anyone else be joining your video visit? No       Belkys JOE    Video Start Time: 10:23 AM  Video-Visit Details    Type of service:  Video Visit    Video End Time:10:33 AM    Originating Location (pt. Location): Home    Distant Location (provider location):  [facility] CANCER [facility]     Platform used for Video Visit: [facility]    ONCOLOGY FOLLOW UP NOTE: 6/09/22        I took the history from reviewing the previous notes that she was following with Dr. Amaya.  I have copied and updated from prior notes.    ONCOLOGY History:    1. January 2006:  Diagnosed with Stage IIB, T2 N1 M0 invasive lobular carcinoma of the right breast.  Final pathology showed a 4.5 x 3.8 x 2.5 cm, 01/14 lymph nodes positive.  Estrogen, progesterone receptor positive, HER-2 negative.     2.  Genetics.  BRCA1 and 2 mutations not detected. 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       THERAPY TO DATE:   1. 2006:  ECOG 2104 protocol of dose-dense Adriamycin, Cytoxan and Avastin x4 cycles followed by Taxol and Avastin x4 cycles.   2.  03/2007:  Completed 1 year Avastin.   3.  11/2006-01/2007:  Radiotherapy to the right breast of 5040 cGy.   4.  03/[id number]:  Aromasin.  Stopped after moving to the [facility].   5.  01/2016:  Right modified radical mastectomy with latissimus dorsi flap reconstruction and a left prophylactic mastectomy with latissimus dorsi flap reconstruction.     She recently had MRI of the brain as a follow-up for multiple sclerosis and there were multiple calvarial lesions noted which was suspicious for metastatic  disease.  There was no evidence of new multiple sclerosis lesions.  She had a PET scan on 8/30/2019 which showed widespread bone metastatic lesions (calvarium, spine, sacrum, pelvis, ribs most prominent at C7, T3, L1 and L4), hypermetabolic 3 cm lesion in LLL, and mediastinal/hilar LN. CEA wnl at 1.9 and C27-29 elevated to 415 (28 on 8/23/16). A CT guided biopsy of the right iliac bone was obtained on 9/4/19, pathology consistent with metastatic adenocarcinoma (breast), ER/WI negative, HER-2 negative PDL 1 < 1%. A second biopsy was take of the LLL nodule via EBUS on 9/5/19 did not show any malignancy.    C/T/L spine MRI 8/3/19 to 9/2/19 with numerous enhancing lesions of the C, T and L spine, largest around T9 and L1. No evidence of cord compression. Has a L1 compression fracture and impending L4.     Received radiation T12-L5 3000 cGy in 10 fractions from 9/6/2019 till 9/18/2019.  Neurosurgery recommended no surgical intervention but to wear [place] brace when out of bed and HOB >30 degrees    She started palliative Xeloda 2000/1500 on 10/1/2019 but after just a few doses she noticed nausea, red eyes blurred vision, loss of appetite.  She stopped taking it after 5 doses and was seen by nurse practitioner on 10/7/2019 when she was improving.  At that point she was restarted on Xeloda at a lower dose of 1000 mg twice a day.  As she was not able to tolerate even the lower dose with extreme nausea and vomiting and feeling extremely fatigued and blurry vision, we decided on stopping Xeloda.    We decided on repeating scans and MRI brain on 10/25/2019 showed no intracranial metastatic disease.   Multiple enhancing calvarial lesions may be increased in number and are suggestive of metastatic disease. Thinner imaging on the current study may identify the smaller lesions and may be responsible for  identified more lesions.   There is a single focus of T2 hyperintense signal within the anterior right temporal lobe may  represent interval demyelination. There is no evidence for active demyelination.    PET/CT on 11/1/2019 showed favorable response to therapy and Overall FDG uptake within scattered osseous metastases is decreased since 8/30/2019, particularly within the pelvis. Increased sclerotic appearance of L1 and L4 pathologic compression deformities, likely sequela of recently completed palliative radiation therapy.    No significant FDG uptake in previously demonstrated hypermetabolic mediastinal lymph nodes. Biopsy negative on 9/5/2019.  There is also decreased FDG uptake in the left lower lobe (biopsy negative for  malignancy), now with dense consolidation containing air bronchograms suggestive of infection versus aspiration.  There is diffuse FDG uptake within the esophagus, consistent with esophagitis.    Because of intolerance to Xeloda we decided on switching to weekly paclitaxel on 11/5/2019.    C#2 Taxol 12/4/19- started with 70mg/m2 dose reduction    C#3 Taxol 12/30/2019     PET/CT on 1/24/2020 showed progression of the disease in some of the bone lesions including increase in size and lytic lesion within the vertebral body of C4 measuring 1 cm as opposed to 0.6 cm previously and a new central soft tissue nodule with SUV max 4.1 when previously it was non-hypermetabolic.  There is also some progression noted in the right proximal femur and right iliac bone.  There is decreased metabolic uptake in the right lamina of T9 with SUV max 4.7 when previously it was 8.0.  Other lesions are stable.    CA 27-29 also had increased significantly and it was 257 on 1/28/2020.    We decided on switching to eribulin on 1/28/2020.    C#2 2/18/2020  C#2 D#8 2/25/2020    C#3 D#1 3/18/2020 -delayed by 1 week as per patient's preference because she wanted to visit her family.    She had a repeat PET/CT on 3/27/2020 which showed stable size of the bone metastatic lesions although the FDG avidity was less, consistent with treatment  response.    She had MRI of the thoracic spine on 4/3/2020 and it was compared to the one which was done in August 2019 and it showed increased size of several metastatic lesions most notably at T9 but also at T5-T7.  Other lesions at T10, T11 and T12 appeared improved.    CA 27-29 was also down to 222 on 3/18/2020.    Cycle #4 eribulin ( dose reduced to 1.2 mg/m2 )-4/7/2020-   Cycle #5 Eribulin ( 1.2 mg/m2 )- 4/28/2020   Cycle #6 Eribulin ( 1.2 mg/m2 )- 5/19/2020     Cycle #7 Eribulin ( 1.2 mg/m2 )- 6/9/2020    Cycle #8 Eribulin ( 1.2 mg/m2 )- 6/30/2020     She had a repeat PET scan on 7/17/2020 which showed incidental finding of new acute bilateral pulmonary embolism in the distal left main pulmonary artery extending in the left upper and lower lobes and in the segmental and branch arteries in the right lower lobe.    It also showed mildly increased FDG avidity in the lytic lesion in the T9 lamina and right pedicle with SUV max 5.3, previously 3.9.  That is also slightly increased FDG uptake to the left anterior fifth rib at the costochondral junction SUV max 3.9, previously 2.5.  There is slightly decreased FDG uptake in the right femoral neck lesion SUV max 2.2, previously 2.9.  FDG uptake in other bone lesions is fairly stable.  No new metastasis are seen.    CA 27-29 was 308 on 6/30/2020.  It was 286 on 5/19/2020.    Overall this is consistent with only slight progression versus stable disease.    She was started on Lovenox.    Cycle #9 eribulin 7/21/2020. CA 27-29 was 238    C#10 eribulin 8/18/2020. ( delayed by one week for ANC 0.9 )    C#11 Eribulin 9/8/2020    C#12 Eribulin 9/29/2020     C#13 Eribulin 10/20/2020     PET scan on 11/6/2020 shows progression of the disease with increased FDG uptake in the lytic lesions in the T9/T10 and right posterolateral elements as well as increased FDG uptake in the previously known hypermetabolic right iliac bone lesion suggesting recurrence of previously treated  metastasis.  There is new subtle lesion in the right manubrium.  There is also a new fracture in the anterolateral left fourth rib with associated uptake and this could be a pathologic fracture.  Healing fracture in the left anterior fifth rib lesion.  There is resolution of the left pulmonary emboli.  Decreased FDG uptake of the esophagus consistent with improving inflammation.    CA 27-29 on 10/20/2020 was also elevated at 489.    C#1 Trodelvy on 11/11/2020.  Cycle #2 Trodelvy 12/2/2020  Cycle number 2-day #8 Trodelvy 12/8/2020.    12/15/2020-12/16/2020.  She was admitted to the hospital with nausea vomiting and dehydration.  She had tachycardia and initial lactic acid of 5.  It decreased to 1.4 after 2 L of normal saline.  She also had hypokalemia and ANC was 1.3.  She was treated with IV fluids.  Procalcitonin was negative.  CTA of the chest was negative for pulmonary embolism or pneumonia.  No bacterial infection was documented.  Her medication which she was initially unable to tolerate at home, were restarted and she was discharged home once started to feel better.      We delayed the start of cycle number 3 x 1 week and decrease the dose of chemotherapy by 25%.  She also had neutropenia with ANC of 1.0.      She started cycle number 3-day #1 on 12/29/2020.  CA 27-29 was 392.    Cycle number 3-day #8 1/5/2021.    C#4 Trodelvy 1/20/2021- 75% dose    2/5/2021.  PET/CT overall shows a good response to treatment with improvement of previously hypermetabolic lesions in the spine and pelvis and stability of other bone meta stasis.  There is a single lytic lesion in the right iliac bone which demonstrates slight Yimi increased FDG uptake and has SUV max 4.7 while previously it was SUV max 3.4.  There was diffuse wall thickening of the esophagus with uptake in the esophagus in the fundus of the stomach and this could be seen with inflammation.    Trodelvy cycle #5 - 2/10/2021.  75% of the dose.  CA 27-29 was  337.    Trodelvy cycle #6 - 3/3/2021.  75% of the dose.  Trodelvy cycle #6, D#8 - 3/10/2021.  75% of the dose.    Trodelvy C#8, D#8 on 4/21/2021  CA 27-29 stable at 371 on 4/14/2021    Trodelvy, cycle #9- 5/5/2021        On 2/25/2020 when she had biopsy of the right acetabulum and it was consistent with metastatic lobular carcinoma.  Again it was Triple Negative.     On 3/18/2020 when she also met with Dr. Arelis Arshad who also advised testing for androgen receptor studies on the biopsy specimen.  Tumor was diffusely androgen receptor positive.      5/26/2021-cycle #10 Trodelvy started    CA 27-29 was 545.    6/11/2021.  PET/CT shows mildly increased uptake in several bone lesions for example in the left anterior iliac crest, mid right iliac crest and left ischium.  Uptake in other bone lesions are similar to previously.    Because of minimal progression of the disease and she is tolerating chemotherapy very well, we decided on continuing the same chemotherapy.    6/16/2021-Trodelvy cycle #11    7/7/2021 -Trodelvy cycle #12    9/14/2021-Trodelvy-cycle #15, day #8.    9/8/2021-CA 27-29 was more elevated and it was 1176.    PET/CT on 9/24/2021 showed stable findings with no evidence of FDG avid metastatic disease within the body.  Left axillary lymph nodes are prominent and hypermetabolic and likely from recent vaccinations in the left deltoid muscle.  Healed bony metastasis seems stable.      Cycle #16, Trodelvy 9/28/2021.  Cycle #17, day #8 Trodelvy was on 10/26/2021.  Cycle #18, day #8 Trodelvy on 11/22/2021       She saw Dr. Mejia whom on 10/6/2021.  She was not considered eligible for Enfortumab trial.    Cycle #19, Trodelvy on 12/7/2021 12/21/2021.  PET/CT showed progression of bony metastatic disease.  There is increase hypermetabolism in both the right and left iliac bones and the sternum.  Other bone lesions are stable.    There is new scattered areas of groundglass throughout both the lungs and these  findings are indeterminate but may represent an infectious etiology.  Interval resolution of left axillary FDG avid lymphadenopathy.    She was started on Casodex 150 mg daily on 1/6/2022.        She was admitted to the hospital from 3/17/2022-3/19/2022 for vomiting and diarrhea and  severe back pain.  I reviewed the discharge summary.  CT lumbar spine showed a stable severe chronic L1 pathologic compression fracture.  Stable mild compression deformity of superior endplate of L3 and superior endplate of L4.  The upper margin of the L4 fracture extends slightly into the spinal canal without high-grade canal stenosis and this is also stable.  Nondisplaced fractures coursing through the L3 pedicles bilaterally were seen which were not clearly seen previously this could be acute and likely pathologic.  No extraspinal soft tissue abnormality.  Neurosurgery recommended conservative management.  Her pain was controlled and she was discharged home as she felt better with this.      3/31/2022-PET/CT shows progressive bone meta stasis with progressive FDG uptake in the following lesions. Left iliac crest lesion with max SUV of 7.2, previously 3.5. Right mid iliac crest lytic lesion shows maximum SUV of 7.8, previously 6.9.  T10 vertebral body mixed lytic and sclerotic lesion with an SUV of 7.1, previously not hypermetabolic. Thoracic vertebral bodies 8, 7, 5, and 4 also show hypermetabolic lesions were previously there was no hypermetabolism.  Some of the sclerotic osseous lesions are unchanged and do not show increased hypermetabolism compatible treated lesion such as a severe compression deformity at L1 as well as superior compression deformity at L4.  Medial right clavicle sclerotic lesion with SUV max of 4.8, previously not hypermetabolic. There is also right-sided sternal lesions.      4/14/2022--C#1- switch to single agent Doxil 50 mg per metered squared every 4 weeks.    5/13/2022-cycle #2- Doxil- dose reduced to 40 mg  per metered square due to severe nausea/vomiting and migraines requiring IV fluids and IV antiemetics.        Interval history.  This is a video visit.    She tolerated the last infusion the video visit.  She had mild nausea.  No diarrhea constipation.  Overall energy has been decent.  No fevers or infection.  Migraines have been better.  Saw Dr Mixon from Neurology for migraines and now takes Ubrelvy for breakthrough pain. Aimovig requires pre-authorization. I reviewed the notes from neurology.  Cancer related pain in her low back is under good control.  She is following with palliative care.  She has mild neuropathy. She needs a prescription for wig for alopecia.    ECOG 1    ROS:  Rest of the comprehensive review of the system was negative.        I reviewed other history in epic as below.       PAST MEDICAL HISTORY:     1.  Breast cancer  2.  Multiple sclerosis.   3.  Depression.   4.  Migraines.   5.  Hypertension.   6.  Cholecystectomy and umbilical hernia repair.     SOCIAL HISTORY: She smoked for 5 years but quit many years ago.  Drinks alcohol socially.  She lives with her .  She is a teacher in middle school.       FAMILY HISTORY: She mentions that her mother had breast cancer at 49.  Both grandmothers had breast cancer but she is not sure of the age.  A couple of cousins have cancers.  Patient has 3 children who are healthy.    Current Outpatient Medications   Medication     acetaminophen (TYLENOL) 500 MG tablet     buprenorphine (BUTRANS) 10 MCG/HR WK patch     busPIRone (BUSPAR) 15 MG tablet     calcium citrate-vitamin D (CITRACAL) 315-250 MG-UNIT TABS     Cholecalciferol (VITAMIN D3 PO)     ELIQUIS ANTICOAGULANT 5 MG tablet     erenumab-aooe (AIMOVIG) 70 MG/ML injection     ibuprofen (ADVIL/MOTRIN) 600 MG tablet     Lidocaine (LIDOCARE) 4 % Patch     loratadine (CLARITIN) 10 MG tablet     LORazepam (ATIVAN) 0.5 MG tablet     LORazepam (ATIVAN) 1 MG tablet     magnesium 250 MG tablet      morphine (MSIR) 15 MG IR tablet     Nerve Stimulator (NERIVIO) CRISTOPHER     OLANZapine (ZYPREXA) 2.5 MG tablet     ondansetron (ZOFRAN-ODT) 8 MG ODT tab     prochlorperazine (COMPAZINE) 10 MG tablet     propranolol ER (INDERAL LA) 80 MG 24 hr capsule     senna-docusate (SENOKOT-S/PERICOLACE) 8.6-50 MG tablet     traZODone (DESYREL) 50 MG tablet     ubrogepant (UBRELVY) 50 MG tablet     venlafaxine (EFFEXOR-XR) 75 MG 24 hr capsule     No current facility-administered medications for this visit.        Allergies   Allergen Reactions     Bactrim [Sulfamethoxazole W/Trimethoprim]      Internal bleeding     Copaxone [Glatiramer] Hives          PHYSICAL EXAMINATION:     There were no vitals taken for this visit.  Wt Readings from Last 4 Encounters:   06/09/22 61.5 kg (135 lb 8 oz)   05/13/22 61 kg (134 lb 8 oz)   04/21/22 59.6 kg (131 lb 8 oz)   04/14/22 62.1 kg (136 lb 12.8 oz)           Constitutional.  Looks well and in no apparent distress.   Eyes.  Without eye redness or apparent jaundice.   Respiratory.  Non labored breathing. Speaking in full sentences.    Skin.  No concerning skin rashes on the skin visualized.   Neurological.  Is alert and oriented.  Psychiatric.  Mood and affect seem appropriate.      The rest of a comprehensive physical examination is deferred due to Public Health Emergency video visit restrictions.        Labs and Imaging.    Reviewed.    5/13/2022.  CBC showed WBC 2.8.  Hemoglobin 12.5.  Platelets 202.  Neutrophil count 1.5  CMP unremarkable    CA 27-29 was 3370 on 5/13/2022.  It was 5307 on 4/14/2022 ferritin Doxil was started.      CA 27-29 was 3146 on 3/1/2022.  This continues to increase.    9/28/2021.      Iron studies, ferritin is 101, iron 51, TIBC 268 and iron saturation index 19%.        3/31/2022-PET/CT shows progressive bone metastasis with progressive FDG uptake in the following lesions. Left iliac crest lesion with max SUV of 7.2, previously 3.5. Right mid iliac crest lytic lesion  shows maximum SUV of 7.8, previously 6.9.  T10 vertebral body mixed lytic and sclerotic lesion with an SUV of 7.1, previously not hypermetabolic. Thoracic vertebral bodies 8, 7, 5, and 4 also show hypermetabolic lesions were previously there was no hypermetabolism.  Some of the sclerotic osseous lesions are unchanged and do not show increased hypermetabolism compatible treated lesion such as a severe compression deformity at L1 as well as superior compression deformity at L4.  Medial right clavicle sclerotic lesion with SUV max of 4.8, previously not hypermetabolic. There is also right-sided sternal lesions.      Biopsy from the right acetabulum shows metastatic lobular carcinoma.  It is triple negative.  Androgen receptor was diffusely positive (90%, moderate intensity) by immunohistochemistry. )  PD-L1 negative.    Foundation one showed CDH1 mutation (initially lobular cancer), CCND1 amplification ( equivocal ). FGF3, FGF4, FGF19 amplification ( Equivocal ). Most promising is CCND1 amplification with abemaciclib showing response in 4/10 patients. FGF3,FGF4 and FGF19 are targetable with pan-FGFR inhibitors.           ASSESSMENT AND PLAN:     Metastatic breast cancer negative breast cancer (androgen receptor positive ) with extensive involvement of the bones.  There is also a left lower lobe hypermetabolic lesion and mediastinal lymph nodes which are hypermetabolic.  The biopsy from the right iliac bone is consistent with metastatic lobular breast cancer but it is hormone receptor and HER-2 negative and PDL 1 is also negative.    Foundation 1 testing did not reveal any actionable mutations.    She was intolerant to Xeloda and progressed on Taxol and eribulin.      We then switched to recently FDA approved sacituzumab govitecan-hziy (Trodelvy) for TNBC, based on the results of the phase II IMMU-132-01 clinical trial.   She started this on 11/11/2020.      She obtained a second opinion from Dr. Castillo from [facility] who recommended a repeat biopsy to be done to make sure that she really has triple negative breast cancer.      She also met with Dr. Arelis Arshad on 3/18/2021.      After 10 cycles of Trodelvy, she had PET/CT on 6/11/2021 which showed mildly increased uptake in some of the bone lesions while stability in other bone lesions.        After 15 cycles, repeat PET scan showed stable findings.  CA 27-29 has been increasing and it is 1176.      As she was tolerating the chemotherapy well and her quality of life has been decent and scans were stable although she had a rising CA 27-29, we decided on continuing the same chemotherapy because it has been a very slow progression of the disease.    Now there is clear progression of the disease in the bones.  There is increased FDG uptake in both right and left iliac bones and the sternum.    Foundation one showed CDH1 mutation (initially lobular cancer), CCND1 amplification ( equivocal ). FGF3, FGF4, FGF19 amplification ( Equivocal ). Most promising is CCND1 amplification with abemaciclib showing response in 4/10 patients. FGF3,FGF4 and FGF19 are targetable with pan-FGFR inhibitor    As the tumor is diffusely positive for androgen receptor, we we decided to start her on androgen receptor blockers.    I initially recommended enzalutamide 160 mg daily ( Enzalutamide for the Treatment of Androgen Receptor-Expressing Triple-Negative Breast Cancer----J Clin Oncol. 2018 Mar 20; 36(9): 884-890. ).    Eventually we decided to start her on Casodex 150 mg daily instead of Enzalutamide due to cost issues.  Clinical Trial Clin Cancer Res. 2013 Oct 1;19(19):5505-12.   Phase II trial of bicalutamide in patients with androgen receptor-positive, estrogen receptor-negative metastatic Breast Cancer  She started Casodex on 1/6/2022.    She had progressive disease in the bones on the PET scan on 3/31/2022.    We changed to single agent Doxil on 4/14/2022.      She developed  significant nausea vomiting and headache so cycle #2 was given on 5/13/2022 with 20% dose reduction.    She tolerated cycle #2 video visit.    We will proceed with cycle #3 on 6/10/2022 and we will repeat PET scan after that.    Last CA 27-29 was coming down.  We will serially monitor.    Baseline echocardiogram on 4/7/2022 was unremarkable with EF 60 to 65%.    Bone disease.  She has metastatic disease to the bone.  Previously she got palliative radiation to T12-L5 3000 cGy in 10 fractions from 9/6/2019 till 9/18/2019.  She was evaluated by orthopedics Dr. iBpin Elliott on 11/1/2019 and conservative management was recommended.    She was on Zometa every 3 months. She last received on 9/29/2020.  Because of progression of the disease in the bones, we switched to Xgeva which she received on 11/11/2020.    She continues to show progression in the bones.  We are continuing with monthly Xgeva.  Continue vitamin D and calcium.  Repeat PET scan in 1 month.       Leukopenia/neutropenia.  From chemotherapy.  Last time chemotherapy was dose reduced by 20%.  Repeat labs tomorrow.      Pain management. Now better controlled on buprenorphine patch and she takes occasional morphine.  Following with palliative care.     Migraines.  Much better now with as needed as needed Ubrelvy for breakthrough pain. Aimovig requires pre-authorization. I reviewed the notes from neurology.      Nausea.  This was also under much better controlled with this cycle.  She takes antiemetics as needed.      Bilateral pulmonary emboli.  Tolerating Eliquis for incidentally found PE. Cont the same    Alopecia.  She needs a prescription for wig/cranial prosthesis.  I gave it to her.      We did not address the following today.    Constipation. Continue senna.    Neuropathy.  This remains mild and stable with neuropathy in her hands.    This is in addition to the chronic balance issues and heat and cold sensitivity from underlying multiple sclerosis  which is also stable for years.  Continue to monitor.     Subcutaneous nodule in the scalp.  This looks like an epidermoid cyst.  She thinks it is a stable.  Continue to monitor.       Insomnia.  Continue trazodone.      Wall thickening of esophagus and FDG uptake of fundus of the stomach.  It could be in the setting of inflammation from recent nausea and vomiting.  Symptoms have resolved.  Continue to monitor clinically.    Vision changes.    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  It was thought that this could be due to Taxol as patient reported to the ophthalmologist that she was on Taxol in September 2019 when her symptoms started.  But she was not on Taxol in September 2019 when she started noticing the visual changes so Taxol is not responsible for this.  The first dose of Taxol was on 11/5/2019.   She had left cataract extraction on 2/8/2021 and she has noticed significant improvement in her vision.       Increased FDG ability in the colon.  She had FDG uptake in the cecum and ascending colon but no corresponding lesion was seen on the CT scan.  She is completely asymptomatic so at this time we will continue to observe.    Discussion regarding healthcare directives.  On previous visit she told me that she will bring the health care directive for our records but she forgot so I told her to bring it on next visit.      Breast implant removal.  She tells me that she was planning to do breast implant removal because there was a recall on this.  Her surgery was scheduled for 9/24/2019.  Because of this new development of metastatic breast cancer and her recent radiation, surgery has been canceled.  We discussed that at this time treatment for metastatic breast cancer would take precedence over the other surgery as the other surgery would require her to be off chemotherapy for several weeks and in that case the chances of cancer progression would be high and I would not  recommend that.    Multiple sclerosis.  She will continue to follow with her neurologist Dr. Quiles.  Currently multiple sclerosis is under control and currently she is not taking any medications.    See me back before cycle #4      All of her questions were answered to her satisfaction.  She is agreeable and comfortable with the plan.    Dewayne Zepeda MD

## 2022-06-09 NOTE — PATIENT INSTRUCTIONS
Chemo tomorrow    Schedule PET scan around 6/30/2022    See me back in [facility] on 7/6/2022 at 730 and chemo

## 2022-06-10 ENCOUNTER — PATIENT OUTREACH (OUTPATIENT)
Dept: ONCOLOGY | Facility: CLINIC | Age: 62
End: 2022-06-10

## 2022-06-10 ENCOUNTER — INFUSION THERAPY VISIT (OUTPATIENT)
Dept: INFUSION THERAPY | Facility: CLINIC | Age: 62
End: 2022-06-10

## 2022-06-10 ENCOUNTER — LAB (OUTPATIENT)
Dept: ONCOLOGY | Facility: CLINIC | Age: 62
End: 2022-06-10
Payer: MEDICARE

## 2022-06-10 VITALS
SYSTOLIC BLOOD PRESSURE: 125 MMHG | TEMPERATURE: 97.8 F | WEIGHT: 137.4 LBS | BODY MASS INDEX: 25.13 KG/M2 | DIASTOLIC BLOOD PRESSURE: 98 MMHG | HEART RATE: 82 BPM | OXYGEN SATURATION: 95 %

## 2022-06-10 DIAGNOSIS — C50.912 BILATERAL MALIGNANT NEOPLASM OF BREAST IN FEMALE, UNSPECIFIED ESTROGEN RECEPTOR STATUS, UNSPECIFIED SITE OF BREAST (H): ICD-10-CM

## 2022-06-10 DIAGNOSIS — C50.919 METASTATIC BREAST CANCER: ICD-10-CM

## 2022-06-10 DIAGNOSIS — Z13.220 ENCOUNTER FOR LIPID SCREENING FOR CARDIOVASCULAR DISEASE: ICD-10-CM

## 2022-06-10 DIAGNOSIS — R79.9 ABNORMAL FINDING OF BLOOD CHEMISTRY, UNSPECIFIED: ICD-10-CM

## 2022-06-10 DIAGNOSIS — Z13.6 ENCOUNTER FOR LIPID SCREENING FOR CARDIOVASCULAR DISEASE: ICD-10-CM

## 2022-06-10 DIAGNOSIS — D70.1 CHEMOTHERAPY-INDUCED NEUTROPENIA (H): ICD-10-CM

## 2022-06-10 DIAGNOSIS — C79.51 BONE METASTASES: ICD-10-CM

## 2022-06-10 DIAGNOSIS — T45.1X5A CHEMOTHERAPY-INDUCED NEUTROPENIA (H): ICD-10-CM

## 2022-06-10 DIAGNOSIS — E87.6 HYPOKALEMIA: Primary | ICD-10-CM

## 2022-06-10 DIAGNOSIS — C50.911 BILATERAL MALIGNANT NEOPLASM OF BREAST IN FEMALE, UNSPECIFIED ESTROGEN RECEPTOR STATUS, UNSPECIFIED SITE OF BREAST (H): ICD-10-CM

## 2022-06-10 LAB
ALBUMIN SERPL-MCNC: 3.2 G/DL (ref 3.4–5)
ALP SERPL-CCNC: 61 U/L (ref 40–150)
ALT SERPL W P-5'-P-CCNC: 26 U/L (ref 0–50)
ANION GAP SERPL CALCULATED.3IONS-SCNC: 5 MMOL/L (ref 3–14)
AST SERPL W P-5'-P-CCNC: 23 U/L (ref 0–45)
BASOPHILS # BLD AUTO: 0 10E3/UL (ref 0–0.2)
BASOPHILS NFR BLD AUTO: 1 %
BILIRUB SERPL-MCNC: 0.2 MG/DL (ref 0.2–1.3)
BUN SERPL-MCNC: 5 MG/DL (ref 7–30)
CALCIUM SERPL-MCNC: 8.5 MG/DL (ref 8.5–10.1)
CANCER AG27-29 SERPL-ACNC: 1992 U/ML (ref 0–39)
CHLORIDE BLD-SCNC: 105 MMOL/L (ref 94–109)
CHOLEST SERPL-MCNC: 225 MG/DL
CO2 SERPL-SCNC: 30 MMOL/L (ref 20–32)
CREAT SERPL-MCNC: 0.67 MG/DL (ref 0.52–1.04)
EOSINOPHIL # BLD AUTO: 0.1 10E3/UL (ref 0–0.7)
EOSINOPHIL NFR BLD AUTO: 4 %
ERYTHROCYTE [DISTWIDTH] IN BLOOD BY AUTOMATED COUNT: 16.5 % (ref 10–15)
FASTING STATUS PATIENT QL REPORTED: NO
GFR SERPL CREATININE-BSD FRML MDRD: >90 ML/MIN/1.73M2
GLUCOSE BLD-MCNC: 94 MG/DL (ref 70–99)
HBA1C MFR BLD: 5.6 % (ref 0–5.6)
HCT VFR BLD AUTO: 33.9 % (ref 35–47)
HDLC SERPL-MCNC: 41 MG/DL
HGB BLD-MCNC: 11.4 G/DL (ref 11.7–15.7)
IMM GRANULOCYTES # BLD: 0.1 10E3/UL
IMM GRANULOCYTES NFR BLD: 2 %
LDLC SERPL CALC-MCNC: 145 MG/DL
LYMPHOCYTES # BLD AUTO: 0.6 10E3/UL (ref 0.8–5.3)
LYMPHOCYTES NFR BLD AUTO: 23 %
MCH RBC QN AUTO: 28.6 PG (ref 26.5–33)
MCHC RBC AUTO-ENTMCNC: 33.6 G/DL (ref 31.5–36.5)
MCV RBC AUTO: 85 FL (ref 78–100)
MONOCYTES # BLD AUTO: 0.5 10E3/UL (ref 0–1.3)
MONOCYTES NFR BLD AUTO: 18 %
NEUTROPHILS # BLD AUTO: 1.4 10E3/UL (ref 1.6–8.3)
NEUTROPHILS NFR BLD AUTO: 52 %
NONHDLC SERPL-MCNC: 184 MG/DL
NRBC # BLD AUTO: 0 10E3/UL
NRBC BLD AUTO-RTO: 0 /100
PLATELET # BLD AUTO: 162 10E3/UL (ref 150–450)
POTASSIUM BLD-SCNC: 3.8 MMOL/L (ref 3.4–5.3)
PROT SERPL-MCNC: 6.2 G/DL (ref 6.8–8.8)
RBC # BLD AUTO: 3.98 10E6/UL (ref 3.8–5.2)
SODIUM SERPL-SCNC: 140 MMOL/L (ref 133–144)
TRIGL SERPL-MCNC: 193 MG/DL
WBC # BLD AUTO: 2.8 10E3/UL (ref 4–11)

## 2022-06-10 PROCEDURE — 99207 PR NO CHARGE LOS: CPT

## 2022-06-10 PROCEDURE — 96372 THER/PROPH/DIAG INJ SC/IM: CPT | Mod: 59 | Performed by: NURSE PRACTITIONER

## 2022-06-10 PROCEDURE — 86300 IMMUNOASSAY TUMOR CA 15-3: CPT | Performed by: NURSE PRACTITIONER

## 2022-06-10 PROCEDURE — 96413 CHEMO IV INFUSION 1 HR: CPT | Performed by: NURSE PRACTITIONER

## 2022-06-10 PROCEDURE — 99207 PR NO CHARGE NURSE ONLY: CPT

## 2022-06-10 PROCEDURE — 85025 COMPLETE CBC W/AUTO DIFF WBC: CPT | Performed by: NURSE PRACTITIONER

## 2022-06-10 PROCEDURE — 83036 HEMOGLOBIN GLYCOSYLATED A1C: CPT | Performed by: NURSE PRACTITIONER

## 2022-06-10 PROCEDURE — 96367 TX/PROPH/DG ADDL SEQ IV INF: CPT | Performed by: NURSE PRACTITIONER

## 2022-06-10 PROCEDURE — 80061 LIPID PANEL: CPT | Performed by: NURSE PRACTITIONER

## 2022-06-10 PROCEDURE — 80053 COMPREHEN METABOLIC PANEL: CPT | Performed by: NURSE PRACTITIONER

## 2022-06-10 RX ORDER — HEPARIN SODIUM (PORCINE) LOCK FLUSH IV SOLN 100 UNIT/ML 100 UNIT/ML
500 SOLUTION INTRAVENOUS
Status: DISCONTINUED | OUTPATIENT
Start: 2022-06-10 | End: 2022-06-10 | Stop reason: HOSPADM

## 2022-06-10 RX ORDER — HEPARIN SODIUM (PORCINE) LOCK FLUSH IV SOLN 100 UNIT/ML 100 UNIT/ML
5 SOLUTION INTRAVENOUS
Status: DISCONTINUED | OUTPATIENT
Start: 2022-06-10 | End: 2022-06-10 | Stop reason: HOSPADM

## 2022-06-10 RX ADMIN — HEPARIN SODIUM (PORCINE) LOCK FLUSH IV SOLN 100 UNIT/ML 5 ML: 100 SOLUTION at 12:38

## 2022-06-10 RX ADMIN — HEPARIN SODIUM (PORCINE) LOCK FLUSH IV SOLN 100 UNIT/ML 500 UNITS: 100 SOLUTION at 10:05

## 2022-06-10 ASSESSMENT — PAIN SCALES - GENERAL: PAINLEVEL: NO PAIN (0)

## 2022-06-10 NOTE — PROGRESS NOTES
Infusion Nursing Note:  Shaneka Alvarez presents today for C3D1 Doxil.    Patient seen by provider today: No   present during visit today: Not Applicable.    Note: Pt c/o ongoing neuropathy in fingers/feet, states she has a decreased appetite, nausea that resolves with antiemetic.  Occasional vomiting with migraine but just started a new migraine medication with relief.  See flow sheet for assessment.    Intravenous Access:  Implanted Port.    Treatment Conditions:  Lab Results   Component Value Date    HGB 11.4 (L) 06/10/2022    WBC 2.8 (L) 06/10/2022    ANEU 1.9 10/26/2021    ANEUTAUTO 1.4 (L) 06/10/2022     06/10/2022      Lab Results   Component Value Date     06/10/2022    POTASSIUM 3.8 06/10/2022    MAG 2.0 04/21/2022    CR 0.67 06/10/2022    RAFAELA 8.5 06/10/2022    BILITOTAL 0.2 06/10/2022    ALBUMIN 3.2 (L) 06/10/2022    ALT 26 06/10/2022    AST 23 06/10/2022     Results reviewed, labs MET treatment parameters, ok to proceed with treatment.    Post Infusion Assessment:  Patient tolerated infusion without incident.  Patient tolerated injection without incident.  Blood return noted pre and post infusion.  Site patent and intact, free from redness, edema or discomfort.  No evidence of extravasations.  Access discontinued per protocol.     Discharge Plan:   Patient discharged in stable condition accompanied by: self.  Departure Mode: Ambulatory.  Pt will RTC 7/6/22 for C4D1 Doxil/Xgeva.      Javier Roland RN

## 2022-06-10 NOTE — PROGRESS NOTES
"Patient's name and  were verified.  See Doc Flowsheet - IV assess for details.  IVAD accessed with 20G 3/\" mcnair gripper plus needle  blood return positive: YES  Site without redness, tenderness or swelling: YES  flushed with 20cc NS and 5cc 100u/ml heparin  Needle: left accessed for infusion appointment  Comments: Patient's port accessed using sterile procedure. Blood return noted. Lab tubes drawn, labeled and sent to lab. Patient tolerated lab draw well.     Devorah Parham RN, BSN  "

## 2022-06-10 NOTE — PROGRESS NOTES
Provided patient with prescription for wig.  She states she is feeling very well and just had a birthday.  She has been riding her bike too.

## 2022-06-13 ENCOUNTER — CARE COORDINATION (OUTPATIENT)
Dept: ONCOLOGY | Facility: CLINIC | Age: 62
End: 2022-06-13
Payer: MEDICARE

## 2022-06-13 NOTE — PROGRESS NOTES
Shaneka requesting Dr Zepeda put in valium prescription as premedication for upcoming MRI.     Dr Zepeda requested that Shaneka contact her MRI ordering provider, Emelia Mixon CNP.    This information relayed to Shaneka and she stated she agreed with this plan. Shaneka states she is feeling well and has no other needs at this time.    Keira Brooks RN

## 2022-06-16 ENCOUNTER — OFFICE VISIT (OUTPATIENT)
Dept: OPHTHALMOLOGY | Facility: CLINIC | Age: 62
End: 2022-06-16
Attending: OPHTHALMOLOGY
Payer: MEDICARE

## 2022-06-16 DIAGNOSIS — Z96.1 PSEUDOPHAKIA OF BOTH EYES: ICD-10-CM

## 2022-06-16 DIAGNOSIS — H35.373 EPIRETINAL MEMBRANE (ERM) OF BOTH EYES: Primary | ICD-10-CM

## 2022-06-16 PROCEDURE — 99213 OFFICE O/P EST LOW 20 MIN: CPT | Mod: GC | Performed by: STUDENT IN AN ORGANIZED HEALTH CARE EDUCATION/TRAINING PROGRAM

## 2022-06-16 PROCEDURE — G0463 HOSPITAL OUTPT CLINIC VISIT: HCPCS | Mod: 25

## 2022-06-16 RX ORDER — B-COMPLEX WITH VITAMIN C
1 TABLET ORAL DAILY
COMMUNITY

## 2022-06-16 ASSESSMENT — VISUAL ACUITY
CORRECTION_TYPE: GLASSES
OS_CC: 20/40
OS_PH_CC+: -1
OD_CC+: +1
OS_CC+: +1
METHOD: SNELLEN - LINEAR
OS_PH_CC: 20/30
OD_CC: 20/30

## 2022-06-16 ASSESSMENT — REFRACTION_WEARINGRX
OS_ADD: +2.50
OD_AXIS: 175
OD_ADD: +2.50
OD_CYLINDER: +1.00
OS_AXIS: 170
OS_SPHERE: -2.00
SPECS_TYPE: PAL
OS_CYLINDER: +0.25
OD_SPHERE: -0.50

## 2022-06-16 ASSESSMENT — TONOMETRY
IOP_METHOD: TONOPEN
OS_IOP_MMHG: 14
OD_IOP_MMHG: 14

## 2022-06-16 ASSESSMENT — CONF VISUAL FIELD
OD_NORMAL: 1
METHOD: COUNTING FINGERS
OS_NORMAL: 1

## 2022-06-16 ASSESSMENT — SLIT LAMP EXAM - LIDS
COMMENTS: NORMAL
COMMENTS: NORMAL

## 2022-06-16 ASSESSMENT — CUP TO DISC RATIO
OD_RATIO: 0.4
OS_RATIO: 0.4

## 2022-06-16 ASSESSMENT — EXTERNAL EXAM - LEFT EYE: OS_EXAM: NORMAL

## 2022-06-16 ASSESSMENT — EXTERNAL EXAM - RIGHT EYE: OD_EXAM: NORMAL

## 2022-06-16 NOTE — NURSING NOTE
Chief Complaints and History of Present Illnesses   Patient presents with     Cystoid Macular Edema Follow Up     Chief Complaint(s) and History of Present Illness(es)     Cystoid Macular Edema Follow Up               Comments     Patient states that her vision is blurry at distance and near in BE. She states that she went to her PCP and they checked her vision and told her she needed to get her eyes check. Patient is s/p cataract extraction in both eyes with . Floaters intermittently.  No flashes of light. No pain.     Geoffrey BAIG, June 16, 2022 2:03 PM

## 2022-06-16 NOTE — PROGRESS NOTES
I have confirmed the patient's and reviewed Past Medical History, Past Surgical History, Social History, Family History, Problem List, Medication List and agree with Tech note.        CC -   Follow up history of CME    INTERVAL HISTORY - s/p CE IOL each eye (right eye 3/2021, left eye 2/2021 with Dr. Barkley). Vision much improved.      [facility] -   Shaneka Alvarez is a  60 year old year-old patient with history of breast cancer on chemotherapy and history of CME s/p receiving   with decreased vision OU   Diagnosed with breast cancer 2005, metastasis 9/2020, started on chemotherapy Xeloda and experienced decreased in vision OU. Seen by Clara at [facility] and was receiving intravitreal injections [id number].   Received radiation T12-L5 3000 cGy in 10 fractions from 9/6/2019 till 9/18/2019.      PAST OCULAR SURGERY      RETINAL IMAGING:  OCT 6/16/22  OD - hazy, slight progression in ERM, irregular foveal contour, no CME  OS - hazy, ERM slight improvement, normal foveal contour, no CME      ASSESSMENT & PLAN    1. History of CME  - per patient, CME s/p Xeloda  None today     2.  Pseudophakia, OU   S/p CEIOL right eye (3/8/21)  S/p CEIOL left eye (2/8/21)    3.  Myopia with astigmatism and presbyopia  New MRx today     4.  Epiretinal membrane both eyes    - Informed patient   - monitor by OCT       return to clinic: 12 months for Exam/OCT both eyes     Darlyn Almanzar MD  Ophthalmology Resident, PGY-3  [facility]         ATTESTATION:    I have seen and examined the patient with  and agree with the findings in this note,as well as the interpretations of the diagnostic tests.    Ralph Sexton MD PhD.  Professor & Chair      ATTESTATION     Attending Attestation:

## 2022-06-21 DIAGNOSIS — G89.3 CANCER ASSOCIATED PAIN: ICD-10-CM

## 2022-06-21 DIAGNOSIS — C79.51 BONE METASTASES: ICD-10-CM

## 2022-06-21 RX ORDER — BUPRENORPHINE 10 UG/H
1 PATCH TRANSDERMAL
Qty: 4 PATCH | Refills: 0 | Status: SHIPPED | OUTPATIENT
Start: 2022-06-21 | End: 2022-07-06

## 2022-06-26 ENCOUNTER — OFFICE VISIT (OUTPATIENT)
Dept: URGENT CARE | Facility: URGENT CARE | Age: 62
End: 2022-06-26
Payer: MEDICARE

## 2022-06-26 ENCOUNTER — NURSE TRIAGE (OUTPATIENT)
Dept: NURSING | Facility: CLINIC | Age: 62
End: 2022-06-26

## 2022-06-26 VITALS
SYSTOLIC BLOOD PRESSURE: 131 MMHG | OXYGEN SATURATION: 97 % | WEIGHT: 127 LBS | HEIGHT: 62 IN | BODY MASS INDEX: 23.37 KG/M2 | HEART RATE: 113 BPM | DIASTOLIC BLOOD PRESSURE: 89 MMHG | TEMPERATURE: 97.6 F

## 2022-06-26 DIAGNOSIS — B02.9 HERPES ZOSTER WITHOUT COMPLICATION: Primary | ICD-10-CM

## 2022-06-26 PROCEDURE — 99213 OFFICE O/P EST LOW 20 MIN: CPT | Performed by: FAMILY MEDICINE

## 2022-06-26 RX ORDER — TRIAMCINOLONE ACETONIDE 1 MG/G
CREAM TOPICAL
Qty: 45 G | Refills: 0 | Status: SHIPPED | OUTPATIENT
Start: 2022-06-26 | End: 2022-09-30

## 2022-06-26 RX ORDER — VALACYCLOVIR HYDROCHLORIDE 1 G/1
1000 TABLET, FILM COATED ORAL 3 TIMES DAILY
Qty: 21 TABLET | Refills: 0 | Status: SHIPPED | OUTPATIENT
Start: 2022-06-26 | End: 2022-09-30

## 2022-06-26 RX ORDER — PREDNISONE 20 MG/1
TABLET ORAL
Qty: 10 TABLET | Refills: 0 | Status: SHIPPED | OUTPATIENT
Start: 2022-06-26 | End: 2022-09-30

## 2022-06-26 NOTE — PROGRESS NOTES
"  Assessment & Plan     Herpes zoster without complication  Advised with good hygiene  Keep area cover  Discussed with pt to consider shingles vaccine in the irasema  Patient currently on chemotherapy.  - valACYclovir (VALTREX) 1000 mg tablet; Take 1 tablet (1,000 mg) by mouth 3 times daily for 7 days  - predniSONE (DELTASONE) 20 MG tablet; 2 tab daily for 5 days  - triamcinolone (KENALOG) 0.1 % external cream; Apply to area bid for 5- 7 days, then bid as needed    She is concerned of shortness of breath, patient has no fever, no chills, no cough, no lower extremity edema.  Exam was normal today.  Advised with supportive care.      No follow-ups on file.    Jeimy Ruvalcaba MD  [facility] OVIDIO Munroe is a 62 year old presenting for the following health issues:  Rash (Back of let shoulder and arm. Pt is currently on chemo.) and Shortness of Breath (Right after Chemo.)  Patient with history of breast cancer, she is on chemotherapy.  She has been having painful rash on her left shoulder area the past 3 days, she reports it is painful, pain to touch.  She has no fever, no chills.  She has no similar rash in the past, she has no history of shingles.    Patient reports she been feeling more short of breath, she has no cough, no chest pain, no lower extremity edema.  Denies chest pain, she has no fever no chills.  She is currently on Eliquis, no lower ext edema and no calf tenderness    HPI     Review of Systems   Constitutional, HEENT, cardiovascular, pulmonary, GI, , musculoskeletal, neuro, skin, endocrine and psych systems are negative, except as otherwise noted.      Objective    /89 (BP Location: Left arm, Patient Position: Sitting, Cuff Size: Adult Regular)   Pulse 113   Temp 97.6  F (36.4  C) (Tympanic)   Ht 1.575 m (5' 2\")   Wt 57.6 kg (127 lb)   SpO2 97%   BMI 23.23 kg/m    Body mass index is 23.23 kg/m .  Physical Exam   GENERAL: healthy, alert and no " distress  NECK: no adenopathy, no asymmetry, masses, or scars and thyroid normal to palpation  RESP: lungs clear to auscultation - no rales, rhonchi or wheezes  CV: regular rate and rhythm, normal S1 S2, no S3 or S4, no murmur, click or rub, no peripheral edema and peripheral pulses strong  ABDOMEN: soft, nontender, no hepatosplenomegaly, no masses and bowel sounds normal  SKIN: excoriation - left shoulder, area and zosteriform eruption - left shoulder area, vesicular type of lesion.  PSYCH: mentation appears normal, affect normal/bright      Jeimy Ruvalcaba MD.          .  ..

## 2022-06-26 NOTE — TELEPHONE ENCOUNTER
"Triage Call:     Pt has a rash that has developed on Friday and is getting worse    Rash location; Left Shoulder to armpit   \"Feels like blisters\" but it is in an area that is hard for patient to see  Pt reports that the rash is \"Very painful\" when touched and \"burns\"  The rash is minimally painful when not being touched rated 1-2/10    No recent exposure to irritants or weeds    Pt got very sick last week with nausea and vomiting and had been feeling more tired after cancer treatment and noticed more shortness of breath on and off over the last few weeks while she is bike-riding. She denies SOB right now as a current concern.     Disposition: See HCP within 4 hours or PCP triage. Pt is going to the nearby [facility] right now to have the rash check out. Writer also told patient to mention her worsening SOB with activity to the provider she sees today.     Viky Rodriguez RN  [facility] Nurse Advisor 3:50 PM 6/26/2022    Reason for Disposition    Shingles suspected (i.e., painful rash, multiple small blisters grouped together in one area of body; dermatomal distribution)    [1] Shingles rash (matches SYMPTOMS) AND [2] weak immune system (e.g., HIV positive,  cancer chemotherapy, chronic steroid treatment, splenectomy) AND [3] NOT taking antiviral medication    Additional Information    Negative: [1] Sudden onset of rash (within last 2 hours) AND [2] difficulty with breathing or swallowing    Negative: Sounds like a life-threatening emergency to the triager    Negative: Poison ivy, oak, or sumac contact suspected    Negative: Insect bite(s) suspected    Negative: Ringworm suspected (i.e., round pink patch, sometimes looks like ring, usually 1/2 to 1 inch [12-25 mm],  in size, slowly increasing in size)    Negative: Athlete's Foot suspected (i.e., itchy rash between the toes)    Negative: Jock Itch suspected (i.e., itchy rash on inner thighs near genital area)    Negative: Wound infection suspected (i.e., pain, spreading " redness, or pus; in a cut, puncture, scrape or sutured wound)    Negative: Impetigo suspected  (i.e., painless infected superficial small sores, less than 1 inch or 2.5 cm, often covered by a soft, yellow-brown scab or crust; sometimes occurring near nasal openings)    Negative: Difficult to awaken or acting confused (e.g., disoriented, slurred speech)    Negative: Sounds like a life-threatening emergency to the triager    Negative: [1] Localized rash AND [2] doesn't match the SYMPTOMS of shingles    Negative: [1] Back pain AND [2] doesn't match the SYMPTOMS of shingles    Negative: Shingles Vaccine (Recombinant Zoster Vaccine; RZV; Shingrix), questions about    Negative: Patient sounds very sick or weak to the triager    Protocols used: RASH OR REDNESS - [id number]-H-TD, SHINGLES-A-AH

## 2022-06-28 ENCOUNTER — ANCILLARY PROCEDURE (OUTPATIENT)
Dept: GENERAL RADIOLOGY | Facility: CLINIC | Age: 62
End: 2022-06-28
Payer: MEDICARE

## 2022-06-28 DIAGNOSIS — Z85.3 HISTORY OF BREAST CANCER: ICD-10-CM

## 2022-06-28 DIAGNOSIS — R51.9 WORSENING HEADACHES: Primary | ICD-10-CM

## 2022-06-28 RX ADMIN — HEPARIN SODIUM (PORCINE) LOCK FLUSH IV SOLN 100 UNIT/ML 5 ML: 100 SOLUTION at 18:49

## 2022-06-29 DIAGNOSIS — G43.009 MIGRAINE WITHOUT AURA AND WITHOUT STATUS MIGRAINOSUS, NOT INTRACTABLE: ICD-10-CM

## 2022-06-29 RX ORDER — PROPRANOLOL HYDROCHLORIDE 80 MG/1
CAPSULE, EXTENDED RELEASE ORAL
Qty: 30 CAPSULE | Refills: 0 | Status: SHIPPED | OUTPATIENT
Start: 2022-06-29 | End: 2022-09-26

## 2022-06-30 NOTE — PROGRESS NOTES
" brain MRI results reviewed and everything stable at this time. I sent a message to patient's oncologist  Dr Zepeda to review the report especially with focus on osseous metastasis. Dr Zepeda did not think there are any new osseous mets and your oncologist has been following them closely.   Per Dr Zepeda, \"osseous mets are not really new and we have been following them with repeated PET scans.\"      "

## 2022-07-01 ENCOUNTER — HOSPITAL ENCOUNTER (EMERGENCY)
Facility: CLINIC | Age: 62
Discharge: HOME | End: 2022-07-01
Attending: FAMILY MEDICINE | Admitting: FAMILY MEDICINE
Payer: MEDICARE

## 2022-07-01 ENCOUNTER — HOSPITAL ENCOUNTER (OUTPATIENT)
Dept: PET IMAGING | Facility: CLINIC | Age: 62
Discharge: HOME | End: 2022-07-01
Attending: INTERNAL MEDICINE | Admitting: INTERNAL MEDICINE
Payer: MEDICARE

## 2022-07-01 VITALS
RESPIRATION RATE: 18 BRPM | SYSTOLIC BLOOD PRESSURE: 121 MMHG | OXYGEN SATURATION: 100 % | TEMPERATURE: 98.6 F | DIASTOLIC BLOOD PRESSURE: 90 MMHG | HEART RATE: 83 BPM

## 2022-07-01 DIAGNOSIS — C79.51 BONE METASTASES: ICD-10-CM

## 2022-07-01 DIAGNOSIS — C50.919 METASTATIC BREAST CANCER: ICD-10-CM

## 2022-07-01 DIAGNOSIS — B02.29 POST ZOSTER NEURALGIA: ICD-10-CM

## 2022-07-01 PROCEDURE — 71260 CT THORAX DX C+: CPT | Mod: 26 | Performed by: RADIOLOGY

## 2022-07-01 PROCEDURE — 78816 PET IMAGE W/CT FULL BODY: CPT | Mod: PS

## 2022-07-01 PROCEDURE — A9552 F18 FDG: HCPCS | Performed by: INTERNAL MEDICINE

## 2022-07-01 PROCEDURE — 74177 CT ABD & PELVIS W/CONTRAST: CPT

## 2022-07-01 PROCEDURE — 343N000001 HC RX 343: Performed by: INTERNAL MEDICINE

## 2022-07-01 PROCEDURE — 74177 CT ABD & PELVIS W/CONTRAST: CPT | Mod: 26 | Performed by: RADIOLOGY

## 2022-07-01 PROCEDURE — 99283 EMERGENCY DEPT VISIT LOW MDM: CPT | Performed by: FAMILY MEDICINE

## 2022-07-01 PROCEDURE — 99284 EMERGENCY DEPT VISIT MOD MDM: CPT | Performed by: FAMILY MEDICINE

## 2022-07-01 PROCEDURE — 78816 PET IMAGE W/CT FULL BODY: CPT | Mod: 26 | Performed by: RADIOLOGY

## 2022-07-01 PROCEDURE — 250N000011 HC RX IP 250 OP 636: Performed by: INTERNAL MEDICINE

## 2022-07-01 RX ORDER — IOPAMIDOL 755 MG/ML
45-150 INJECTION, SOLUTION INTRAVASCULAR ONCE
Status: COMPLETED | OUTPATIENT
Start: 2022-07-01 | End: 2022-07-01

## 2022-07-01 RX ORDER — GABAPENTIN 300 MG/1
300 CAPSULE ORAL 3 TIMES DAILY
Qty: 45 CAPSULE | Refills: 0 | Status: SHIPPED | OUTPATIENT
Start: 2022-07-08 | End: 2022-09-01

## 2022-07-01 RX ORDER — GABAPENTIN 100 MG/1
100 CAPSULE ORAL 3 TIMES DAILY
Qty: 21 CAPSULE | Refills: 0 | Status: SHIPPED | OUTPATIENT
Start: 2022-07-01 | End: 2022-09-01

## 2022-07-01 RX ADMIN — IOPAMIDOL 77 ML: 755 INJECTION, SOLUTION INTRAVENOUS at 13:41

## 2022-07-01 RX ADMIN — FLUDEOXYGLUCOSE F-18 10.09 MCI.: 500 INJECTION, SOLUTION INTRAVENOUS at 12:56

## 2022-07-02 NOTE — ED PROVIDER NOTES
[facility] ED Provider Note   Patient: Shaneka Alvarez  MRN #:  [id number]  Date of Visit: July 1, 2022    CC:     Chief Complaint   Patient presents with     Shingles     HPI:  Shaneka Alvarez is a 62 year old female with metastatic breast cancer who is terminal who presented to the emergency department with pain related to her recent shingles diagnosis.  Patient was diagnosed a week ago with shingles when she developed some blistering lesions in the left axilla.  She was started on valacyclovir, prednisone, and triamcinolone cream.  For the last 2 days her pain is increased.  She is on a morphine patch, and developed a raw area in the left waist area.  Patient does not have anything else for pain and was told that she might benefit from Neurontin.  She does not have any fever, chills, night sweats, nausea, vomiting.  She is allergic to Bactrim.  She has not noticed any swollen lymph nodes.    Problem List:  Patient Active Problem List    Diagnosis Date Noted     Elevated serum creatinine 03/18/2022     Priority: Medium     Hyponatremia 03/17/2022     Priority: Medium     Vomiting 03/17/2022     Priority: Medium     Bony metastasis (H) 03/17/2022     Priority: Medium     Acute kidney injury (H) 03/17/2022     Priority: Medium     Sepsis without acute organ dysfunction (H)-possible  03/17/2022     Priority: Medium     Closed fracture of pedicle of lumbar vertebra, initial encounter (H) 03/17/2022     Priority: Medium     Acute midline low back pain, unspecified whether sciatica present 03/17/2022     Priority: Medium     Anemia, unspecified type 10/18/2021     Priority: Medium     Hyperphosphatemia 07/28/2021     Priority: Medium     Drug-induced polyneuropathy (H) 02/18/2021     Priority: Medium     Cancer associated pain 02/03/2021     Priority: Medium     Posterior subcapsular polar age-related cataract of both eyes 01/29/2021     Priority:  Medium     Added automatically from request for surgery [id number]       Iris synechiae, bilateral 01/29/2021     Priority: Medium     Added automatically from request for surgery [id number]       Dehydration 12/15/2020     Priority: Medium     Chemotherapy induced nausea and vomiting 12/15/2020     Priority: Medium     Sinus tachycardia 12/15/2020     Priority: Medium     Drug-induced nausea and vomiting 12/15/2020     Priority: Medium     Chemotherapy-induced neutropenia (H) 11/10/2020     Priority: Medium     Pulmonary embolism without acute cor pulmonale, unspecified chronicity, unspecified pulmonary embolism type (H) 08/17/2020     Priority: Medium     Hypokalemia 07/28/2020     Priority: Medium     Bone metastases (H) 10/22/2019     Priority: Medium     Metastatic breast cancer (H) 09/18/2019     Priority: Medium     Metastatic disease (H) 08/31/2019     Priority: Medium     S/P breast reconstruction, bilateral 07/31/2018     Priority: Medium     Anxiety and depression 10/25/2017     Priority: Medium     Hx of breast cancer 10/25/2017     Priority: Medium     Major depressive disorder, recurrent episode, moderate (H) 04/07/2016     Priority: Medium     Anxiety 03/12/2016     Priority: Medium     Migraine without aura and without status migrainosus, not intractable 10/23/2015     Priority: Medium     Obesity, Class II, BMI 35-39.9 10/23/2015     Priority: Medium     Multiple sclerosis (H) 08/11/2015     Priority: Medium     CARDIOVASCULAR SCREENING; LDL GOAL LESS THAN 160 08/11/2015     Priority: Medium     Raynaud's syndrome 08/11/2015     Priority: Medium     Breast cancer (H) 08/11/2015     Priority: Medium     Age 44       Complication of anesthesia 08/11/2015     Priority: Medium     Arthritis 08/11/2015     Priority: Medium     Autoimmune disorder (H) 08/11/2015     Priority: Medium     Other insomnia 08/11/2015     Priority: Medium     Medication management contract agreement 08/11/2015     Priority: Medium      Ambien 12.5 mg, dispense 30 per month, follow up every 6 months        Neurogenic bladder 12/22/2014     Priority: Medium     Vision changes 12/22/2014     Priority: Medium     Demyelinating disorder (H) 12/22/2014     Priority: Medium     Weakness 11/20/2014     Priority: Medium     GERD (gastroesophageal reflux disease) 10/22/2014     Priority: Medium     History of breast cancer 10/22/2014     Priority: Medium     Overview:   stage 3, diagnosed in 2006 s/p double mastectomy, chemo and radiation.        Migraine 10/22/2014     Priority: Medium       Past Medical History:   Diagnosis Date     Breast cancer (H)      Cataract      Chemotherapy induced nausea and vomiting 12/15/2020     Common migraine      Esophageal reflux      H/O bilateral mastectomy      Mild major depression (H)      Multiple sclerosis (H)      Pulmonary embolism (H)        MEDS: gabapentin (NEURONTIN) 100 MG capsule  [START ON 7/8/2022] gabapentin (NEURONTIN) 300 MG capsule  acetaminophen (TYLENOL) 500 MG tablet  buprenorphine (BUTRANS) 10 MCG/HR WK patch  busPIRone (BUSPAR) 15 MG tablet  calcium citrate-vitamin D (CITRACAL) 315-250 MG-UNIT TABS  Cholecalciferol (VITAMIN D3 PO)  ELIQUIS ANTICOAGULANT 5 MG tablet  erenumab-aooe (AIMOVIG) 70 MG/ML injection  ibuprofen (ADVIL/MOTRIN) 600 MG tablet  Lidocaine (LIDOCARE) 4 % Patch  loratadine (CLARITIN) 10 MG tablet  LORazepam (ATIVAN) 0.5 MG tablet  LORazepam (ATIVAN) 1 MG tablet  magnesium 250 MG tablet  morphine (MSIR) 15 MG IR tablet  Nerve Stimulator (NERIVIO) CRISTOPHER  OLANZapine (ZYPREXA) 2.5 MG tablet  ondansetron (ZOFRAN-ODT) 8 MG ODT tab  predniSONE (DELTASONE) 20 MG tablet  prochlorperazine (COMPAZINE) 10 MG tablet  propranolol ER (INDERAL LA) 80 MG 24 hr capsule  senna-docusate (SENOKOT-S/PERICOLACE) 8.6-50 MG tablet  traZODone (DESYREL) 50 MG tablet  triamcinolone (KENALOG) 0.1 % external cream  ubrogepant (UBRELVY) 50 MG tablet  valACYclovir (VALTREX) 1000 mg tablet  venlafaxine  (EFFEXOR-XR) 75 MG 24 hr capsule  vitamin B-2 (RIBOFLAVIN) 25 MG TABS tablet        ALLERGIES:    Allergies   Allergen Reactions     Bactrim [Sulfamethoxazole W/Trimethoprim]      Internal bleeding     Copaxone [Glatiramer] Hives       Past Surgical History:   Procedure Laterality Date     CHOLECYSTECTOMY       ENDOBRONCHIAL ULTRASOUND FLEXIBLE N/A 2019    Procedure: Flexible Bronchoscopy, Endobronchial Ultrasound, Radial Probe;  Surgeon: Sree Sanchez MD;  Location: [facility] OR     HC REMOVAL OF OVARY/TUBE(S)       HERNIA REPAIR, UMBILICAL       mastectomy  Bilateral 2006     MASTECTOMY, BILATERAL       PHACOEMULSIFICATION CLEAR CORNEA WITH TORIC INTRAOCULAR LENS IMPLANT Left 2021    Procedure: PHACOEMULSIFICATION, COMPLEX CATARACT, WITH INTRAOCULAR LENS IMPLANT, RESIDENT TORIC LENS LEFT;  Surgeon: Luis Barkley MD;  Location: [facility] OR     PHACOEMULSIFICATION CLEAR CORNEA WITH TORIC INTRAOCULAR LENS IMPLANT Right 3/8/2021    Procedure: PHACOEMULSIFICATION, CATARACT, WITH INTRAOCULAR LENS IMPLANT, TORIC LENS RIGHT;  Surgeon: Luis Barkley MD;  Location: [facility] OR       Social History     Tobacco Use     Smoking status: Former Smoker     Types: Cigarettes     Quit date: 2005     Years since quittin.5     Smokeless tobacco: Never Used   Vaping Use     Vaping Use: Never used   Substance Use Topics     Alcohol use: Not Currently     Alcohol/week: 2.0 standard drinks     Types: 1 Glasses of wine, 1 Cans of beer per week     Drug use: No         Review of Systems   Except as noted in HPI, all other systems were reviewed and are negative    Physical Exam     Vitals were reviewed  Patient Vitals for the past 8 hrs:   BP Temp Temp src Pulse Resp SpO2   22 1857 (!) 121/90 98.6  F (37  C) Temporal 83 18 100 %     GENERAL APPEARANCE: Alert and oriented x3, mild to moderate discomfort due to reported pain  FACE: normal facies  EYES: Pupils are equal  HENT: normal external exam  RESP:  normal respiratory effort; clear breath sounds bilaterally  CV: regular rate and rhythm; no significant murmurs, gallops or rubs  ABD: soft, no tenderness; no rebound or guarding; bowel sounds are normal  EXT: No calf tenderness or pitting edema  SKIN: Patient has healing lesions in the left axilla.  Slight hyperpigmentation.  No areas of new vesicular lesions noted.  NEURO: no facial droop; no focal deficits, speech is normal        Available Lab/Imaging Results   No new labs from today               Impression     Final diagnoses:   Post zoster neuralgia         ED Course & Medical Decision Making   Shaneka Alvarez is a 62 year old female with metastatic breast cancer who presented to the emergency department 1 week after she was diagnosed with shingles, with increasing neuralgic pain in the left axilla.  Skin exam reveals no new lesions, and skin is intact with no drainage.  Skin is extremely sensitive to light touch.  Patient has morphine patches that she is using.  She is still on her valacyclovir, prednisone taper, and triamcinolone cream.  We discussed adding Neurontin, beginning at 100 mg 3 times a day for 1 week, and increasing to 300 mg 3 times a day in the second week.  I would like her to follow-up with her primary care provider in 1-2 weeks for recheck.  Return to the emergency department if she develops new or worsening symptoms.        Written after-visit summary and instructions were given at the time of discharge.      Discharge Instructions:   You have post zoster neuralgia.  Continue with your current pain management with your morphine patch, prednisone, and valacyclovir.  Begin Neurontin 100 mg 3 times a day for 1 week, and increase to 300 mg 3 times a day beginning in the second week.  You may experience drowsiness, and should not be driving.  Please follow-up with your primary care provider within the next 1-2 weeks for recheck.  Return to the emergency department if you develop new or  worsening symptoms.       Disclaimer: This note consists of words and symbols derived from keyboarding and dictation using voice recognition software.  As a result, there may be errors that have gone undetected.  Please consider this when interpreting information found in this note.       Félxi Palma MD  07/01/22 1948

## 2022-07-02 NOTE — DISCHARGE INSTRUCTIONS
You have post zoster neuralgia.  Continue with your current pain management with your morphine patch, prednisone, and valacyclovir.  Begin Neurontin 100 mg 3 times a day for 1 week, and increase to 300 mg 3 times a day beginning in the second week.  You may experience drowsiness, and should not be driving.  Please follow-up with your primary care provider within the next 1-2 weeks for recheck.  Return to the emergency department if you develop new or worsening symptoms.

## 2022-07-06 ENCOUNTER — INFUSION THERAPY VISIT (OUTPATIENT)
Dept: INFUSION THERAPY | Facility: CLINIC | Age: 62
End: 2022-07-06

## 2022-07-06 ENCOUNTER — VIRTUAL VISIT (OUTPATIENT)
Dept: ONCOLOGY | Facility: CLINIC | Age: 62
End: 2022-07-06

## 2022-07-06 ENCOUNTER — LAB (OUTPATIENT)
Dept: ONCOLOGY | Facility: CLINIC | Age: 62
End: 2022-07-06
Payer: MEDICARE

## 2022-07-06 ENCOUNTER — ONCOLOGY VISIT (OUTPATIENT)
Dept: ONCOLOGY | Facility: CLINIC | Age: 62
End: 2022-07-06
Payer: MEDICARE

## 2022-07-06 VITALS — HEART RATE: 69 BPM | SYSTOLIC BLOOD PRESSURE: 137 MMHG | DIASTOLIC BLOOD PRESSURE: 89 MMHG | OXYGEN SATURATION: 100 %

## 2022-07-06 VITALS — TEMPERATURE: 98.7 F

## 2022-07-06 DIAGNOSIS — C79.51 BONE METASTASES: ICD-10-CM

## 2022-07-06 DIAGNOSIS — C79.51 BONE METASTASES: Primary | ICD-10-CM

## 2022-07-06 DIAGNOSIS — T45.1X5A CHEMOTHERAPY-INDUCED NEUTROPENIA (H): ICD-10-CM

## 2022-07-06 DIAGNOSIS — C50.912 BILATERAL MALIGNANT NEOPLASM OF BREAST IN FEMALE, UNSPECIFIED ESTROGEN RECEPTOR STATUS, UNSPECIFIED SITE OF BREAST (H): ICD-10-CM

## 2022-07-06 DIAGNOSIS — G89.3 CANCER ASSOCIATED PAIN: ICD-10-CM

## 2022-07-06 DIAGNOSIS — D70.1 CHEMOTHERAPY-INDUCED NEUTROPENIA (H): ICD-10-CM

## 2022-07-06 DIAGNOSIS — C50.911 BILATERAL MALIGNANT NEOPLASM OF BREAST IN FEMALE, UNSPECIFIED ESTROGEN RECEPTOR STATUS, UNSPECIFIED SITE OF BREAST (H): ICD-10-CM

## 2022-07-06 DIAGNOSIS — E87.6 HYPOKALEMIA: ICD-10-CM

## 2022-07-06 DIAGNOSIS — C50.919 METASTATIC BREAST CANCER: ICD-10-CM

## 2022-07-06 DIAGNOSIS — E87.6 HYPOKALEMIA: Primary | ICD-10-CM

## 2022-07-06 LAB
ALBUMIN SERPL-MCNC: 2.9 G/DL (ref 3.4–5)
ALP SERPL-CCNC: 51 U/L (ref 40–150)
ALT SERPL W P-5'-P-CCNC: 30 U/L (ref 0–50)
ANION GAP SERPL CALCULATED.3IONS-SCNC: 7 MMOL/L (ref 3–14)
AST SERPL W P-5'-P-CCNC: 22 U/L (ref 0–45)
BASOPHILS # BLD AUTO: 0 10E3/UL (ref 0–0.2)
BASOPHILS NFR BLD AUTO: 1 %
BILIRUB SERPL-MCNC: 0.3 MG/DL (ref 0.2–1.3)
BUN SERPL-MCNC: 10 MG/DL (ref 7–30)
CALCIUM SERPL-MCNC: 8.8 MG/DL (ref 8.5–10.1)
CANCER AG15-3 SERPL-ACNC: 713 U/ML
CHLORIDE BLD-SCNC: 102 MMOL/L (ref 94–109)
CO2 SERPL-SCNC: 29 MMOL/L (ref 20–32)
CREAT SERPL-MCNC: 0.71 MG/DL (ref 0.52–1.04)
EOSINOPHIL # BLD AUTO: 0.1 10E3/UL (ref 0–0.7)
EOSINOPHIL NFR BLD AUTO: 2 %
ERYTHROCYTE [DISTWIDTH] IN BLOOD BY AUTOMATED COUNT: 16.7 % (ref 10–15)
GFR SERPL CREATININE-BSD FRML MDRD: >90 ML/MIN/1.73M2
GLUCOSE BLD-MCNC: 110 MG/DL (ref 70–99)
HCT VFR BLD AUTO: 33.1 % (ref 35–47)
HGB BLD-MCNC: 11.3 G/DL (ref 11.7–15.7)
IMM GRANULOCYTES # BLD: 0.1 10E3/UL
IMM GRANULOCYTES NFR BLD: 2 %
LYMPHOCYTES # BLD AUTO: 0.6 10E3/UL (ref 0.8–5.3)
LYMPHOCYTES NFR BLD AUTO: 20 %
MCH RBC QN AUTO: 29.7 PG (ref 26.5–33)
MCHC RBC AUTO-ENTMCNC: 34.1 G/DL (ref 31.5–36.5)
MCV RBC AUTO: 87 FL (ref 78–100)
MONOCYTES # BLD AUTO: 0.6 10E3/UL (ref 0–1.3)
MONOCYTES NFR BLD AUTO: 21 %
NEUTROPHILS # BLD AUTO: 1.7 10E3/UL (ref 1.6–8.3)
NEUTROPHILS NFR BLD AUTO: 54 %
NRBC # BLD AUTO: 0 10E3/UL
NRBC BLD AUTO-RTO: 0 /100
PLAT MORPH BLD: NORMAL
PLATELET # BLD AUTO: 176 10E3/UL (ref 150–450)
POTASSIUM BLD-SCNC: 3.3 MMOL/L (ref 3.4–5.3)
PROT SERPL-MCNC: 6 G/DL (ref 6.8–8.8)
RBC # BLD AUTO: 3.8 10E6/UL (ref 3.8–5.2)
RBC MORPH BLD: NORMAL
SODIUM SERPL-SCNC: 138 MMOL/L (ref 133–144)
WBC # BLD AUTO: 3 10E3/UL (ref 4–11)

## 2022-07-06 PROCEDURE — 86300 IMMUNOASSAY TUMOR CA 15-3: CPT | Performed by: INTERNAL MEDICINE

## 2022-07-06 PROCEDURE — 96413 CHEMO IV INFUSION 1 HR: CPT | Performed by: INTERNAL MEDICINE

## 2022-07-06 PROCEDURE — 99207 PR NO CHARGE NURSE ONLY: CPT

## 2022-07-06 PROCEDURE — 99215 OFFICE O/P EST HI 40 MIN: CPT | Mod: 25 | Performed by: INTERNAL MEDICINE

## 2022-07-06 PROCEDURE — 96372 THER/PROPH/DIAG INJ SC/IM: CPT | Mod: 59 | Performed by: INTERNAL MEDICINE

## 2022-07-06 PROCEDURE — 80053 COMPREHEN METABOLIC PANEL: CPT | Performed by: INTERNAL MEDICINE

## 2022-07-06 PROCEDURE — 99214 OFFICE O/P EST MOD 30 MIN: CPT | Mod: 95 | Performed by: HOSPITALIST

## 2022-07-06 PROCEDURE — 96367 TX/PROPH/DG ADDL SEQ IV INF: CPT | Performed by: INTERNAL MEDICINE

## 2022-07-06 PROCEDURE — 99207 PR NO CHARGE LOS: CPT

## 2022-07-06 PROCEDURE — 85025 COMPLETE CBC W/AUTO DIFF WBC: CPT | Performed by: INTERNAL MEDICINE

## 2022-07-06 RX ORDER — NALOXONE HYDROCHLORIDE 0.4 MG/ML
0.2 INJECTION, SOLUTION INTRAMUSCULAR; INTRAVENOUS; SUBCUTANEOUS
Status: CANCELLED | OUTPATIENT
Start: 2022-07-06

## 2022-07-06 RX ORDER — HEPARIN SODIUM (PORCINE) LOCK FLUSH IV SOLN 100 UNIT/ML 100 UNIT/ML
5 SOLUTION INTRAVENOUS EVERY 8 HOURS
Status: DISCONTINUED | OUTPATIENT
Start: 2022-07-06 | End: 2022-07-06 | Stop reason: HOSPADM

## 2022-07-06 RX ORDER — POTASSIUM CHLORIDE 750 MG/1
40 TABLET, EXTENDED RELEASE ORAL ONCE
Status: COMPLETED | OUTPATIENT
Start: 2022-07-06 | End: 2022-07-06

## 2022-07-06 RX ORDER — ALBUTEROL SULFATE 90 UG/1
1-2 AEROSOL, METERED RESPIRATORY (INHALATION)
Status: CANCELLED
Start: 2022-07-06

## 2022-07-06 RX ORDER — METHYLPREDNISOLONE SODIUM SUCCINATE 125 MG/2ML
125 INJECTION, POWDER, LYOPHILIZED, FOR SOLUTION INTRAMUSCULAR; INTRAVENOUS
Status: CANCELLED
Start: 2022-07-06

## 2022-07-06 RX ORDER — DIPHENHYDRAMINE HYDROCHLORIDE 50 MG/ML
50 INJECTION INTRAMUSCULAR; INTRAVENOUS
Status: CANCELLED
Start: 2022-07-06

## 2022-07-06 RX ORDER — HEPARIN SODIUM,PORCINE 10 UNIT/ML
5 VIAL (ML) INTRAVENOUS
Status: CANCELLED | OUTPATIENT
Start: 2022-07-06

## 2022-07-06 RX ORDER — HEPARIN SODIUM (PORCINE) LOCK FLUSH IV SOLN 100 UNIT/ML 100 UNIT/ML
5 SOLUTION INTRAVENOUS
Status: CANCELLED | OUTPATIENT
Start: 2022-07-06

## 2022-07-06 RX ORDER — HEPARIN SODIUM (PORCINE) LOCK FLUSH IV SOLN 100 UNIT/ML 100 UNIT/ML
5 SOLUTION INTRAVENOUS
Status: DISCONTINUED | OUTPATIENT
Start: 2022-07-06 | End: 2022-07-06 | Stop reason: HOSPADM

## 2022-07-06 RX ORDER — MEPERIDINE HYDROCHLORIDE 25 MG/ML
25 INJECTION INTRAMUSCULAR; INTRAVENOUS; SUBCUTANEOUS EVERY 30 MIN PRN
Status: CANCELLED | OUTPATIENT
Start: 2022-07-06

## 2022-07-06 RX ORDER — BUPRENORPHINE 10 UG/H
1 PATCH TRANSDERMAL
Qty: 4 PATCH | Refills: 0 | Status: SHIPPED | OUTPATIENT
Start: 2022-07-06 | End: 2022-08-22 | Stop reason: SINTOL

## 2022-07-06 RX ORDER — EPINEPHRINE 1 MG/ML
0.3 INJECTION, SOLUTION INTRAMUSCULAR; SUBCUTANEOUS EVERY 5 MIN PRN
Status: CANCELLED | OUTPATIENT
Start: 2022-07-06

## 2022-07-06 RX ORDER — ALBUTEROL SULFATE 0.83 MG/ML
2.5 SOLUTION RESPIRATORY (INHALATION)
Status: CANCELLED | OUTPATIENT
Start: 2022-07-06

## 2022-07-06 RX ADMIN — HEPARIN SODIUM (PORCINE) LOCK FLUSH IV SOLN 100 UNIT/ML 5 ML: 100 SOLUTION at 10:09

## 2022-07-06 RX ADMIN — POTASSIUM CHLORIDE 40 MEQ: 750 TABLET, EXTENDED RELEASE ORAL at 08:43

## 2022-07-06 RX ADMIN — HEPARIN SODIUM (PORCINE) LOCK FLUSH IV SOLN 100 UNIT/ML 5 ML: 100 SOLUTION at 07:44

## 2022-07-06 ASSESSMENT — PAIN SCALES - GENERAL: PAINLEVEL: MILD PAIN (2)

## 2022-07-06 NOTE — LETTER
7/6/2022         RE: Shaneka Alvarez  [address] [facility] [address]        Dear Colleague,    Thank you for referring your patient, Shaneka Alvarez, to the [facility]. Please see a copy of my visit note below.    Shaneka is a 62 year old who is being evaluated via a billable video visit.      How would you like to obtain your AVS? MyChart  If the video visit is dropped, the invitation should be resent by: Text to cell phone: [phone]  Will anyone else be joining your video visit? Claudia Johnson         Palliative Care Outpatient Clinic Progress Note    Patient Name: Shaneka Alvarez is being evaluated via a billable video visit.    Primary Provider:  [facility], [facility]  Primary Oncologist: Dr Duane Kirby seen by palliative care: myself, 5/11/22      Chief Complaint: feeling well    Background/Summary  Medical:  - breast cancer ER+/WY+, Her2 neg diagnosed in 2006              - s/p systemic treatment with adriamycin, cytoxan, avastin, aromatase inhibitor as well as b/l mastectomy              - relapsed metastatic disease found in skull, vertebrae complicated by pathological fractures L1, L5              - s/p XRT to T12-L5 Sept 2019. Didn't tolerate Xeloda, paclitaxel Nov 2019-Jan 2020, treated with eribulin and zometa. PD seen on PET Nov 2020 as well as a new L 4th rib concerning for a pathologic fracture. Started Trodelvy 11/11/20, PD Dec 21, switched to casodex.               - PD again on PET/CT March 2022 including b/l iliac crest lesions, R clavicle, and multifocal T spine involvement. Switched to single agent Doxil April 2022, dose decrease to 80% at 2nd cycle in light of side effects. Tolerated that well. Complete response on scan July 2022  - b/l PE found on PET July 2020, on anticoagulation  - MS with mild right-sided weakness    - long-standing history of migraines, follows with neurology. On aimovig with ubrelvy prn  - post-zoster  neuralgia (ED Visit 2022)        Care Planning   POLST completed 20: DNR/selective treatments    Opioid Safety   Expected prognosis: > 1 year  Risk: Low (ORT 0)  Indication for Opioid Use: Moderate or Strong  Naloxone Script provided if patient also on benzodiazepine: yes  Indications for Tapering reviewed: good dose decrease with starting buprenorphine     Interim History:  At the last visit we discussed migraines (has seen neurology), nausea (added olanzapine, adjustment of pre-chemo antiemetics), pain (doing well with buprenorphine patch at 10mcg/h)    Patient is on the call by herself    Her migraines seem controlled with aimovig. She had some episodes prior to approval for aimovig, which responded well to ubrelvy.     Her pain is well controlled with buprenorphine 10mcg/h patch. She had to take one patch off early, since she developed zoster underneath.   She has slight sensitivity at the site of her buprenorphine patch at baseline. She does rotate sites. At this time she is ok with the patch.   Hasn't required morphine in the past few weeks    She had an episode of zoster, was started on gabapentin 100mg tid with plan to increase to 300mg in 2 days. She has had no side effects with 100mg tid and has already noticed relief.     Coping:  She is in good spirits, relieved that her cancer seems to be responding to treatments.     Social History:  Pertinent changes to social history/social situation since last visit: no changes        Impression & Recommendations & Counseliny/o woman with metastatic breast cancer on doxil    Pain:   A) migraines: well controlled now with Aimovig and prn ubrelvy  B) postherpetic neuralgia: improving, was started on gabapentin. I can take over prescribing for this. Since she already has improvement, she might not require full dose of 300mg tid and also might only need a relatively short course of gabapentin  C) cancer-related pain: continue buprenorphine 10mcg/h patch  for now. Considering skin sensitivity, offered rotation to [facility] if this becomes intolerable.       Return to clinic in 8 weeks    Data / Chart Review:    Review of Systems:   ROS: 10 point ROS neg other than the symptoms noted above in the HPI and pertinents here.         Physical Exam:   Physical Exam:  Vital Signs not checked, virtual visit    CONSTIT: awake, appears comfortable  EENT: MMM, EOMI, no icterus  RESP: reg, nl effort, no cough  SKIN: no rash, no obvious lesions  NEURO: alert, oriented x3  PSYCH: appropriate affect, memory and thought process intact    The rest of a comprehensive physical examination is deferred  due to public health emergency video visit restrictions.      Current pertinent medications:  Buprenorphine 10mcg/hr patch, weekly  MSIR 15-30mg q3h prn              Naloxone prescribed  Gabapentin 300mg tid  acetaminophen 1000mg tid  Ibuprofen 600mg q6h prn  CBD cream  Lidocaine patch     Dexamethasone 4mg q12h     Venlafaxine 225mg daily  Lorazepam 1mg prn  Trazodone 50mg HS  Buspirone 15mg bid     Olanzapine 2.5-5mg bid prn  Ondansetron prn, compazine prn  Senna bid prn, miralax bid prn    : ok      No pertinent allergies      Lab and imaging data reviewed:  Comments:   PET/CT 7/1/22: In this patient with history of breast cancer with osseous  metastases, there is complete response:  1. No suspicious osseous FDG uptake. Diffuse sclerotic and lytic  lesions of the skeleton, consistent with treated disease.  Resolution  of previous hypermetabolic bone lesions.  2. No suspicious FDG uptake to suggest metastatic disease.          Video-Visit Details    Type of service:  Video Visit    Physician has received verbal consent for a Video Visit from the patient? Yes    Video Start Time: 1434    Video End Time (time video stopped): 1448    Originating Location (pt. Location): Home    Distant Location (provider location):  [facility]     Mode of Communication:   Video Conference via [employer]    Alva Whitaker MD  Palliative Medicine  Pager [phone]        Again, thank you for allowing me to participate in the care of your patient.        Sincerely,        Alva Whitaker MD

## 2022-07-06 NOTE — LETTER
7/6/2022         RE: Shaneka Alvarez  [id number] [facility] [id number]        Dear Colleague,    Thank you for referring your patient, Shaneka Alvarez, to the [facility]. Please see a copy of my visit note below.    ONCOLOGY FOLLOW UP NOTE: 7/06/22        I took the history from reviewing the previous notes that she was following with Dr. Amaya.  I have copied and updated from prior notes.    ONCOLOGY History:    1.  January 2006:  Diagnosed with Stage IIB, T2 N1 M0 invasive lobular carcinoma of the right breast.  Final pathology showed a 4.5 x 3.8 x 2.5 cm, 01/14 lymph nodes positive.  Estrogen, progesterone receptor positive, HER-2 negative.     2.  Genetics.  BRCA1 and 2 mutations not detected. 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       THERAPY TO DATE:   1. 2006:  ECOG 2104 protocol of dose-dense Adriamycin, Cytoxan and Avastin x4 cycles followed by Taxol and Avastin x4 cycles.   2.  03/2007:  Completed 1 year Avastin.   3.  11/2006-01/2007:  Radiotherapy to the right breast of 5040 cGy.   4.  03/20071516-0848:  Aromasin.  Stopped after moving to the [facility].   5.  01/2016:  Right modified radical mastectomy with latissimus dorsi flap reconstruction and a left prophylactic mastectomy with latissimus dorsi flap reconstruction.     She recently had MRI of the brain as a follow-up for multiple sclerosis and there were multiple calvarial lesions noted which was suspicious for metastatic disease.  There was no evidence of new multiple sclerosis lesions.  She had a PET scan on 8/30/2019 which showed widespread bone metastatic lesions (calvarium, spine, sacrum, pelvis, ribs most prominent at C7, T3, L1 and L4), hypermetabolic 3 cm lesion in LLL, and mediastinal/hilar LN. CEA wnl at 1.9 and C27-29 elevated to 415 (28 on 8/23/16). A CT guided biopsy of the right iliac bone was obtained on 9/4/19, pathology consistent with metastatic adenocarcinoma (breast), ER/ID  negative, HER-2 negative PDL 1 < 1%. A second biopsy was take of the LLL nodule via EBUS on 9/5/19 did not show any malignancy.    C/T/L spine MRI 8/3/19 to 9/2/19 with numerous enhancing lesions of the C, T and L spine, largest around T9 and L1. No evidence of cord compression. Has a L1 compression fracture and impending L4.     Received radiation T12-L5 3000 cGy in 10 fractions from 9/6/2019 till 9/18/2019.  Neurosurgery recommended no surgical intervention but to wear [place] brace when out of bed and HOB >30 degrees    She started palliative Xeloda 2000/1500 on 10/1/2019 but after just a few doses she noticed nausea, red eyes blurred vision, loss of appetite.  She stopped taking it after 5 doses and was seen by nurse practitioner on 10/7/2019 when she was improving.  At that point she was restarted on Xeloda at a lower dose of 1000 mg twice a day.  As she was not able to tolerate even the lower dose with extreme nausea and vomiting and feeling extremely fatigued and blurry vision, we decided on stopping Xeloda.    We decided on repeating scans and MRI brain on 10/25/2019 showed no intracranial metastatic disease.   Multiple enhancing calvarial lesions may be increased in number and are suggestive of metastatic disease. Thinner imaging on the current study may identify the smaller lesions and may be responsible for  identified more lesions.   There is a single focus of T2 hyperintense signal within the anterior right temporal lobe may represent interval demyelination. There is no evidence for active demyelination.    PET/CT on 11/1/2019 showed favorable response to therapy and Overall FDG uptake within scattered osseous metastases is decreased since 8/30/2019, particularly within the pelvis. Increased sclerotic appearance of L1 and L4 pathologic compression deformities, likely sequela of recently completed palliative radiation therapy.    No significant FDG uptake in previously demonstrated hypermetabolic  mediastinal lymph nodes. Biopsy negative on 9/5/2019.  There is also decreased FDG uptake in the left lower lobe (biopsy negative for  malignancy), now with dense consolidation containing air bronchograms suggestive of infection versus aspiration.  There is diffuse FDG uptake within the esophagus, consistent with esophagitis.    Because of intolerance to Xeloda we decided on switching to weekly paclitaxel on 11/5/2019.    C#2 Taxol 12/4/19- started with 70mg/m2 dose reduction    C#3 Taxol 12/30/2019     PET/CT on 1/24/2020 showed progression of the disease in some of the bone lesions including increase in size and lytic lesion within the vertebral body of C4 measuring 1 cm as opposed to 0.6 cm previously and a new central soft tissue nodule with SUV max 4.1 when previously it was non-hypermetabolic.  There is also some progression noted in the right proximal femur and right iliac bone.  There is decreased metabolic uptake in the right lamina of T9 with SUV max 4.7 when previously it was 8.0.  Other lesions are stable.    CA 27-29 also had increased significantly and it was 257 on 1/28/2020.    We decided on switching to eribulin on 1/28/2020.    C#2 2/18/2020  C#2 D#8 2/25/2020    C#3 D#1 3/18/2020 -delayed by 1 week as per patient's preference because she wanted to visit her family.    She had a repeat PET/CT on 3/27/2020 which showed stable size of the bone metastatic lesions although the FDG avidity was less, consistent with treatment response.    She had MRI of the thoracic spine on 4/3/2020 and it was compared to the one which was done in August 2019 and it showed increased size of several metastatic lesions most notably at T9 but also at T5-T7.  Other lesions at T10, T11 and T12 appeared improved.    CA 27-29 was also down to 222 on 3/18/2020.    Cycle #4 eribulin ( dose reduced to 1.2 mg/m2 )-4/7/2020-   Cycle #5 Eribulin ( 1.2 mg/m2 )- 4/28/2020   Cycle #6 Eribulin ( 1.2 mg/m2 )- 5/19/2020     Cycle #7  Eribulin ( 1.2 mg/m2 )- 6/9/2020    Cycle #8 Eribulin ( 1.2 mg/m2 )- 6/30/2020     She had a repeat PET scan on 7/17/2020 which showed incidental finding of new acute bilateral pulmonary embolism in the distal left main pulmonary artery extending in the left upper and lower lobes and in the segmental and branch arteries in the right lower lobe.    It also showed mildly increased FDG avidity in the lytic lesion in the T9 lamina and right pedicle with SUV max 5.3, previously 3.9.  That is also slightly increased FDG uptake to the left anterior fifth rib at the costochondral junction SUV max 3.9, previously 2.5.  There is slightly decreased FDG uptake in the right femoral neck lesion SUV max 2.2, previously 2.9.  FDG uptake in other bone lesions is fairly stable.  No new metastasis are seen.    CA 27-29 was 308 on 6/30/2020.  It was 286 on 5/19/2020.    Overall this is consistent with only slight progression versus stable disease.    She was started on Lovenox.    Cycle #9 eribulin 7/21/2020. CA 27-29 was 238    C#10 eribulin 8/18/2020. ( delayed by one week for ANC 0.9 )    C#11 Eribulin 9/8/2020    C#12 Eribulin 9/29/2020     C#13 Eribulin 10/20/2020     PET scan on 11/6/2020 shows progression of the disease with increased FDG uptake in the lytic lesions in the T9/T10 and right posterolateral elements as well as increased FDG uptake in the previously known hypermetabolic right iliac bone lesion suggesting recurrence of previously treated metastasis.  There is new subtle lesion in the right manubrium.  There is also a new fracture in the anterolateral left fourth rib with associated uptake and this could be a pathologic fracture.  Healing fracture in the left anterior fifth rib lesion.  There is resolution of the left pulmonary emboli.  Decreased FDG uptake of the esophagus consistent with improving inflammation.    CA 27-29 on 10/20/2020 was also elevated at 489.    C#1 Trodelvy on 11/11/2020.  Cycle #2 Trodelvy  12/2/2020  Cycle number 2-day #8 Trodelvy 12/8/2020.    12/15/2020-12/16/2020.  She was admitted to the hospital with nausea vomiting and dehydration.  She had tachycardia and initial lactic acid of 5.  It decreased to 1.4 after 2 L of normal saline.  She also had hypokalemia and ANC was 1.3.  She was treated with IV fluids.  Procalcitonin was negative.  CTA of the chest was negative for pulmonary embolism or pneumonia.  No bacterial infection was documented.  Her medication which she was initially unable to tolerate at home, were restarted and she was discharged home once started to feel better.      We delayed the start of cycle number 3 x 1 week and decrease the dose of chemotherapy by 25%.  She also had neutropenia with ANC of 1.0.      She started cycle number 3-day #1 on 12/29/2020.  CA 27-29 was 392.    Cycle number 3-day #8 1/5/2021.    C#4 Trodelvy 1/20/2021- 75% dose    2/5/2021.  PET/CT overall shows a good response to treatment with improvement of previously hypermetabolic lesions in the spine and pelvis and stability of other bone meta stasis.  There is a single lytic lesion in the right iliac bone which demonstrates slight Yimi increased FDG uptake and has SUV max 4.7 while previously it was SUV max 3.4.  There was diffuse wall thickening of the esophagus with uptake in the esophagus in the fundus of the stomach and this could be seen with inflammation.    Trodelvy cycle #5 - 2/10/2021.  75% of the dose.  CA 27-29 was 337.    Trodelvy cycle #6 - 3/3/2021.  75% of the dose.  Trodelvy cycle #6, D#8 - 3/10/2021.  75% of the dose.    Trodelvy C#8, D#8 on 4/21/2021  CA 27-29 stable at 371 on 4/14/2021    Trodelvy, cycle #9- 5/5/2021        On 2/25/2020 when she had biopsy of the right acetabulum and it was consistent with metastatic lobular carcinoma.  Again it was Triple Negative.     On 3/18/2020 when she also met with Dr. Arelis Arshad who also advised testing for androgen receptor studies on the biopsy  specimen.  Tumor was diffusely androgen receptor positive.      5/26/2021-cycle #10 Trodelvy started    CA 27-29 was 545.    6/11/2021.  PET/CT shows mildly increased uptake in several bone lesions for example in the left anterior iliac crest, mid right iliac crest and left ischium.  Uptake in other bone lesions are similar to previously.    Because of minimal progression of the disease and she is tolerating chemotherapy very well, we decided on continuing the same chemotherapy.    6/16/2021-Trodelvy cycle #11    7/7/2021 -Trodelvy cycle #12    9/14/2021-Trodelvy-cycle #15, day #8.    9/8/2021-CA 27-29 was more elevated and it was 1176.    PET/CT on 9/24/2021 showed stable findings with no evidence of FDG avid metastatic disease within the body.  Left axillary lymph nodes are prominent and hypermetabolic and likely from recent vaccinations in the left deltoid muscle.  Healed bony metastasis seems stable.      Cycle #16, Trodelvy 9/28/2021.  Cycle #17, day #8 Trodelvy was on 10/26/2021.  Cycle #18, day #8 Trodelvy on 11/22/2021       She saw Dr. Mejia whom on 10/6/2021.  She was not considered eligible for Enfortumab trial.    Cycle #19, Trodelvy on 12/7/2021 12/21/2021.  PET/CT showed progression of bony metastatic disease.  There is increase hypermetabolism in both the right and left iliac bones and the sternum.  Other bone lesions are stable.    There is new scattered areas of groundglass throughout both the lungs and these findings are indeterminate but may represent an infectious etiology.  Interval resolution of left axillary FDG avid lymphadenopathy.    She was started on Casodex 150 mg daily on 1/6/2022.        She was admitted to the hospital from 3/17/2022-3/19/2022 for vomiting and diarrhea and  severe back pain.  I reviewed the discharge summary.  CT lumbar spine showed a stable severe chronic L1 pathologic compression fracture.  Stable mild compression deformity of superior endplate of L3 and  superior endplate of L4.  The upper margin of the L4 fracture extends slightly into the spinal canal without high-grade canal stenosis and this is also stable.  Nondisplaced fractures coursing through the L3 pedicles bilaterally were seen which were not clearly seen previously this could be acute and likely pathologic.  No extraspinal soft tissue abnormality.  Neurosurgery recommended conservative management.  Her pain was controlled and she was discharged home as she felt better with this.      3/31/2022-PET/CT shows progressive bone meta stasis with progressive FDG uptake in the following lesions. Left iliac crest lesion with max SUV of 7.2, previously 3.5. Right mid iliac crest lytic lesion shows maximum SUV of 7.8, previously 6.9.  T10 vertebral body mixed lytic and sclerotic lesion with an SUV of 7.1, previously not hypermetabolic. Thoracic vertebral bodies 8, 7, 5, and 4 also show hypermetabolic lesions were previously there was no hypermetabolism.  Some of the sclerotic osseous lesions are unchanged and do not show increased hypermetabolism compatible treated lesion such as a severe compression deformity at L1 as well as superior compression deformity at L4.  Medial right clavicle sclerotic lesion with SUV max of 4.8, previously not hypermetabolic. There is also right-sided sternal lesions.      4/14/2022--C#1- switch to single agent Doxil 50 mg per metered squared every 4 weeks.    5/13/2022-cycle #2- Doxil- dose reduced to 40 mg per metered square due to severe nausea/vomiting and migraines requiring IV fluids and IV antiemetics.    6/10/2022-cycle #3-Doxil 40 mg per metered square    7/1/2022- PET/CT shows resolution of the hypermetabolic skeletal metastasis.    7/6/2022-cycle #4-Doxil 40 mg per metered square    Interval history.  She is tolerating chemotherapy well.  Denies any significant nausea vomiting diarrhea constipation.  She developed shingles in the left axilla for which she took valacyclovir  and prednisone.  She developed postherpetic neuralgia for which she is on gabapentin.  The rashes have now dried out.  No other infections.  No shortness of breath.  No new swellings.  No bleeding.  Energy has been good and she is riding her bike on average 50  miles a week.  Migraines are better and now takes Ubrelvy for breakthrough pain. Started Aimovig monthly.    Cancer related pain in her low back is under good control with buprenorphine patch and she has not used as needed morphine for quite some time now.    ECOG 0-1    ROS:  Rest of the comprehensive review of the system was negative.        I reviewed other history in epic as below.       PAST MEDICAL HISTORY:     1.  Breast cancer  2.  Multiple sclerosis.   3.  Depression.   4.  Migraines.   5.  Hypertension.   6.  Cholecystectomy and umbilical hernia repair.     SOCIAL HISTORY: She smoked for 5 years but quit many years ago.  Drinks alcohol socially.  She lives with her .  She is a teacher in middle school.       FAMILY HISTORY: She mentions that her mother had breast cancer at 49.  Both grandmothers had breast cancer but she is not sure of the age.  A couple of cousins have cancers.  Patient has 3 children who are healthy.    Current Outpatient Medications   Medication     acetaminophen (TYLENOL) 500 MG tablet     buprenorphine (BUTRANS) 10 MCG/HR WK patch     busPIRone (BUSPAR) 15 MG tablet     calcium citrate-vitamin D (CITRACAL) 315-250 MG-UNIT TABS     Cholecalciferol (VITAMIN D3 PO)     ELIQUIS ANTICOAGULANT 5 MG tablet     erenumab-aooe (AIMOVIG) 70 MG/ML injection     gabapentin (NEURONTIN) 100 MG capsule     [START ON 7/8/2022] gabapentin (NEURONTIN) 300 MG capsule     ibuprofen (ADVIL/MOTRIN) 600 MG tablet     Lidocaine (LIDOCARE) 4 % Patch     loratadine (CLARITIN) 10 MG tablet     LORazepam (ATIVAN) 1 MG tablet     magnesium 250 MG tablet     morphine (MSIR) 15 MG IR tablet     Nerve Stimulator (NERIVIO) CRISTOPHER     OLANZapine (ZYPREXA)  2.5 MG tablet     ondansetron (ZOFRAN-ODT) 8 MG ODT tab     predniSONE (DELTASONE) 20 MG tablet     prochlorperazine (COMPAZINE) 10 MG tablet     propranolol ER (INDERAL LA) 80 MG 24 hr capsule     senna-docusate (SENOKOT-S/PERICOLACE) 8.6-50 MG tablet     traZODone (DESYREL) 50 MG tablet     triamcinolone (KENALOG) 0.1 % external cream     ubrogepant (UBRELVY) 50 MG tablet     venlafaxine (EFFEXOR-XR) 75 MG 24 hr capsule     vitamin B-2 (RIBOFLAVIN) 25 MG TABS tablet     valACYclovir (VALTREX) 1000 mg tablet     No current facility-administered medications for this visit.     Facility-Administered Medications Ordered in Other Visits   Medication     heparin 100 UNIT/ML injection 5 mL     sodium chloride (PF) 0.9% PF flush 10 mL        Allergies   Allergen Reactions     Bactrim [Sulfamethoxazole W/Trimethoprim]      Internal bleeding     Copaxone [Glatiramer] Hives          PHYSICAL EXAMINATION:     /89 (BP Location: Left arm, Patient Position: Chair, Cuff Size: Adult Regular)   Pulse 69   SpO2 100%   Wt Readings from Last 4 Encounters:   06/26/22 57.6 kg (127 lb)   06/10/22 62.3 kg (137 lb 6.4 oz)   06/09/22 61.5 kg (135 lb 8 oz)   05/13/22 61 kg (134 lb 8 oz)       CONSTITUTIONAL: no acute distress  EYES: PERRLA, no palor or icterus.   ENT/MOUTH: no mouth lesions. Ears normal  CVS: s1s2 no m r g .   RESPIRATORY: clear to auscultation b/l  GI: soft non tender no hepatosplenomegaly  NEURO: AAOX3  Grossly non focal neuro exam  INTEGUMENT: Dry rash from previous shingles in the left axilla.    LYMPHATIC: no palpable cervical, supraclavicular, axillary or inguinal LAD  MUSCULOSKELETAL: Unremarkable. No bony tenderness.   EXTREMITIES: no edema  PSYCH: Mentation, mood and affect are normal. Decision making capacity is intact      Labs and Imaging.    Reviewed.    CBC shows hemoglobin 11.3.  WBC 3.0.  Preliminary ANC is 1.6.  Platelets 176.    Chemistry shows potassium 3.3.  Albumin 2.9.  Otherwise  unremarkable.  CA 27-29 was 1992 on 6/10/2022  CA 27-29 was 3370 on 5/13/2022.  It was 5307 on 4/14/2022 ferritin Doxil was started.      CA 27-29 was 3146 on 3/1/2022.  This continues to increase.    9/28/2021.      Iron studies, ferritin is 101, iron 51, TIBC 268 and iron saturation index 19%.    7/1/2022.  PET/CT shows diffuse sclerotic and lytic osseous lesions without any abnormal FDG uptake in the skeleton, consistent with treated disease.  There is evaluation of previous hypermetabolic bone lesions.  Multiple chronic rib fracture deformities and stable compression fracture deformities of T6, L1 and L4.  No suspicious FDG uptake in the chest abdomen or pelvis.    3/31/2022-PET/CT shows progressive bone metastasis with progressive FDG uptake in the following lesions. Left iliac crest lesion with max SUV of 7.2, previously 3.5. Right mid iliac crest lytic lesion shows maximum SUV of 7.8, previously 6.9.  T10 vertebral body mixed lytic and sclerotic lesion with an SUV of 7.1, previously not hypermetabolic. Thoracic vertebral bodies 8, 7, 5, and 4 also show hypermetabolic lesions were previously there was no hypermetabolism.  Some of the sclerotic osseous lesions are unchanged and do not show increased hypermetabolism compatible treated lesion such as a severe compression deformity at L1 as well as superior compression deformity at L4.  Medial right clavicle sclerotic lesion with SUV max of 4.8, previously not hypermetabolic. There is also right-sided sternal lesions.      Biopsy from the right acetabulum shows metastatic lobular carcinoma.  It is triple negative.  Androgen receptor was diffusely positive (90%, moderate intensity) by immunohistochemistry. )  PD-L1 negative.    Foundation one showed CDH1 mutation (initially lobular cancer), CCND1 amplification ( equivocal ). FGF3, FGF4, FGF19 amplification ( Equivocal ). Most promising is CCND1 amplification with abemaciclib showing response in 4/10 patients.  FGF3,FGF4 and FGF19 are targetable with pan-FGFR inhibitors.           ASSESSMENT AND PLAN:     Metastatic breast cancer negative breast cancer (androgen receptor positive ) with extensive involvement of the bones.  There is also a left lower lobe hypermetabolic lesion and mediastinal lymph nodes which are hypermetabolic.  The biopsy from the right iliac bone is consistent with metastatic lobular breast cancer but it is hormone receptor and HER-2 negative and PDL 1 is also negative.    Foundation 1 testing did not reveal any actionable mutations.    She was intolerant to Xeloda and progressed on Taxol and eribulin.      We then switched to recently FDA approved sacituzumab govitecan-hziy (Trodelvy) for TNBC, based on the results of the phase II IMMU-132-01 clinical trial.   She started this on 11/11/2020.      She obtained a second opinion from Dr. Castillo from [facility] who recommended a repeat biopsy to be done to make sure that she really has triple negative breast cancer.      She also met with Dr. Arelis Arshad on 3/18/2021.      After 10 cycles of Trodelvy, she had PET/CT on 6/11/2021 which showed mildly increased uptake in some of the bone lesions while stability in other bone lesions.        After 15 cycles, repeat PET scan showed stable findings.  CA 27-29 has been increasing and it is 1176.      As she was tolerating the chemotherapy well and her quality of life has been decent and scans were stable although she had a rising CA 27-29, we decided on continuing the same chemotherapy because it has been a very slow progression of the disease.    Now there is clear progression of the disease in the bones.  There is increased FDG uptake in both right and left iliac bones and the sternum.    Foundation one showed CDH1 mutation (initially lobular cancer), CCND1 amplification ( equivocal ). FGF3, FGF4, FGF19 amplification ( Equivocal ). Most promising is CCND1 amplification with abemaciclib showing  response in 4/10 patients. FGF3,FGF4 and FGF19 are targetable with pan-FGFR inhibitor    As the tumor is diffusely positive for androgen receptor, we decided to start her on androgen receptor blockers.    I initially recommended enzalutamide 160 mg daily ( Enzalutamide for the Treatment of Androgen Receptor-Expressing Triple-Negative Breast Cancer----J Clin Oncol. 2018 Mar 20; 36(9): 884-890. ).    Eventually we decided to start her on Casodex 150 mg daily instead of Enzalutamide due to cost issues.  Clinical Trial Clin Cancer Res. 2013 Oct 1;19(19):5505-12.   Phase II trial of bicalutamide in patients with androgen receptor-positive, estrogen receptor-negative metastatic Breast Cancer  She started Casodex on 1/6/2022.    She had progressive disease in the bones on the PET scan on 3/31/2022.    We changed to single agent Doxil on 4/14/2022.      She developed significant nausea vomiting and headache so cycle #2 was given on 5/13/2022 with 20% dose reduction which she tolerated well.    Cycle #3 was given on 6/10/2022 with the same dose reduced Doxil.    Repeat PET/CT on 7/1/2022 shows resolution of the FDG avid bony metastasis consistent with treated disease.  No suspicious FDG uptake was seen in the chest abdomen and pelvis.  CA 27-29 was also coming down.  She is tolerating chemotherapy very well and we will proceed with cycle #4 today.    Baseline echocardiogram on 4/7/2022 was unremarkable with EF 60 to 65%.    Bone disease.  She has metastatic disease to the bone.  Previously she got palliative radiation to T12-L5 3000 cGy in 10 fractions from 9/6/2019 till 9/18/2019.  She was evaluated by orthopedics Dr. Bipin Elliott on 11/1/2019 and conservative management was recommended.    She was on Zometa every 3 months. She last received on 9/29/2020.  Because of progression of the disease in the bones, we switched to Xgeva which she received on 11/11/2020.    She had progression of the disease in the form so we  switched to Doxil but we continued xgeva.  PET scan on 7/1/2022 does not show any suspicious FDG uptake in the bones consistent with treated disease.  Continue Xgeva calcium and vitamin D.        Leukopenia/neutropenia.  From chemotherapy.  Preliminary ANC is normal.  We will keep the same dose of chemotherapy.  Continue to monitor.      Shingles.  The rash has dried out.  She completed well with acyclovir and prednisone.  She takes gabapentin for postherpetic neuralgia and is doing better.      Pain management.  Doing very well on buprenorphine patch.  She has not required as needed morphine recently.  Following with palliative care.         Migraines.   Doing much better.  She takes Aimovig monthly and takes Ubrelvy for breakthrough pain.  Follow with neurology.      Hypokalemia.  This is mild.  Advised her to increase intake of potassium rich foods like kiwi's, bananas, dates etc.    Nausea.  This was also under much better controlled with this cycle.  She takes antiemetics as needed.      Bilateral pulmonary emboli.  Continue Eliquis      We did not address the following today.    Constipation. Continue senna.    Neuropathy.  This remains mild and stable with neuropathy in her hands.    This is in addition to the chronic balance issues and heat and cold sensitivity from underlying multiple sclerosis which is also stable for years.  Continue to monitor.     Subcutaneous nodule in the scalp.  This looks like an epidermoid cyst.  She thinks it is a stable.  Continue to monitor.       Insomnia.  Continue trazodone.      Wall thickening of esophagus and FDG uptake of fundus of the stomach.  It could be in the setting of inflammation from recent nausea and vomiting.  Symptoms have resolved.  Continue to monitor clinically.    Vision changes.    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  It was thought that this could be due to Taxol as patient reported to the ophthalmologist that  she was on Taxol in September 2019 when her symptoms started.  But she was not on Taxol in September 2019 when she started noticing the visual changes so Taxol is not responsible for this.  The first dose of Taxol was on 11/5/2019.   She had left cataract extraction on 2/8/2021 and she has noticed significant improvement in her vision.       Increased FDG ability in the colon.  She had FDG uptake in the cecum and ascending colon but no corresponding lesion was seen on the CT scan.  She is completely asymptomatic so at this time we will continue to observe.    Discussion regarding healthcare directives.  On previous visit she told me that she will bring the health care directive for our records but she forgot so I told her to bring it on next visit.      Breast implant removal.  She tells me that she was planning to do breast implant removal because there was a recall on this.  Her surgery was scheduled for 9/24/2019.  Because of this new development of metastatic breast cancer and her recent radiation, surgery has been canceled.  We discussed that at this time treatment for metastatic breast cancer would take precedence over the other surgery as the other surgery would require her to be off chemotherapy for several weeks and in that case the chances of cancer progression would be high and I would not recommend that.    Multiple sclerosis.  She will continue to follow with her neurologist Dr. Quiles.  Currently multiple sclerosis is under control and currently she is not taking any medications.    See nurse practitioner before cycle #5 and see me back before cycle #6.      All of her questions were answered to her satisfaction.  She is agreeable and comfortable with the plan.    Dewayne Zepeda MD                Again, thank you for allowing me to participate in the care of your patient.        Sincerely,        Dewayne Zepeda MD

## 2022-07-06 NOTE — PROGRESS NOTES
ONCOLOGY FOLLOW UP NOTE: 7/06/22        I took the history from reviewing the previous notes that she was following with Dr. Amaya.  I have copied and updated from prior notes.    ONCOLOGY History:    1.  January 2006:  Diagnosed with Stage IIB, T2 N1 M0 invasive lobular carcinoma of the right breast.  Final pathology showed a 4.5 x 3.8 x 2.5 cm, 01/14 lymph nodes positive.  Estrogen, progesterone receptor positive, HER-2 negative.     2.  Genetics.  BRCA1 and 2 mutations not detected. 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       THERAPY TO DATE:   1.  2006:  ECOG 2104 protocol of dose-dense Adriamycin, Cytoxan and Avastin x4 cycles followed by Taxol and Avastin x4 cycles.   2.  03/2007:  Completed 1 year Avastin.   3.  11/2006-01/2007:  Radiotherapy to the right breast of 5040 cGy.   4.  03/[id number]:  Aromasin.  Stopped after moving to the [facility].   5.  01/2016:  Right modified radical mastectomy with latissimus dorsi flap reconstruction and a left prophylactic mastectomy with latissimus dorsi flap reconstruction.     She recently had MRI of the brain as a follow-up for multiple sclerosis and there were multiple calvarial lesions noted which was suspicious for metastatic disease.  There was no evidence of new multiple sclerosis lesions.  She had a PET scan on 8/30/2019 which showed widespread bone metastatic lesions (calvarium, spine, sacrum, pelvis, ribs most prominent at C7, T3, L1 and L4), hypermetabolic 3 cm lesion in LLL, and mediastinal/hilar LN. CEA wnl at 1.9 and C27-29 elevated to 415 (28 on 8/23/16). A CT guided biopsy of the right iliac bone was obtained on 9/4/19, pathology consistent with metastatic adenocarcinoma (breast), ER/NC negative, HER-2 negative PDL 1 < 1%. A second biopsy was take of the LLL nodule via EBUS on 9/5/19 did not show any malignancy.    C/T/L spine MRI 8/3/19 to 9/2/19 with numerous enhancing lesions of the C, T and L spine, largest around T9 and L1. No evidence of  cord compression. Has a L1 compression fracture and impending L4.     Received radiation T12-L5 3000 cGy in 10 fractions from 9/6/2019 till 9/18/2019.  Neurosurgery recommended no surgical intervention but to wear Gorge brace when out of bed and HOB >30 degrees    She started palliative Xeloda 2000/1500 on 10/1/2019 but after just a few doses she noticed nausea, red eyes blurred vision, loss of appetite.  She stopped taking it after 5 doses and was seen by nurse practitioner on 10/7/2019 when she was improving.  At that point she was restarted on Xeloda at a lower dose of 1000 mg twice a day.  As she was not able to tolerate even the lower dose with extreme nausea and vomiting and feeling extremely fatigued and blurry vision, we decided on stopping Xeloda.    We decided on repeating scans and MRI brain on 10/25/2019 showed no intracranial metastatic disease.   Multiple enhancing calvarial lesions may be increased in number and are suggestive of metastatic disease. Thinner imaging on the current study may identify the smaller lesions and may be responsible for  identified more lesions.   There is a single focus of T2 hyperintense signal within the anterior right temporal lobe may represent interval demyelination. There is no evidence for active demyelination.    PET/CT on 11/1/2019 showed favorable response to therapy and Overall FDG uptake within scattered osseous metastases is decreased since 8/30/2019, particularly within the pelvis. Increased sclerotic appearance of L1 and L4 pathologic compression deformities, likely sequela of recently completed palliative radiation therapy.    No significant FDG uptake in previously demonstrated hypermetabolic mediastinal lymph nodes. Biopsy negative on 9/5/2019.  There is also decreased FDG uptake in the left lower lobe (biopsy negative for  malignancy), now with dense consolidation containing air bronchograms suggestive of infection versus aspiration.  There is diffuse FDG  uptake within the esophagus, consistent with esophagitis.    Because of intolerance to Xeloda we decided on switching to weekly paclitaxel on 11/5/2019.    C#2 Taxol 12/4/19- started with 70mg/m2 dose reduction    C#3 Taxol 12/30/2019     PET/CT on 1/24/2020 showed progression of the disease in some of the bone lesions including increase in size and lytic lesion within the vertebral body of C4 measuring 1 cm as opposed to 0.6 cm previously and a new central soft tissue nodule with SUV max 4.1 when previously it was non-hypermetabolic.  There is also some progression noted in the right proximal femur and right iliac bone.  There is decreased metabolic uptake in the right lamina of T9 with SUV max 4.7 when previously it was 8.0.  Other lesions are stable.    CA 27-29 also had increased significantly and it was 257 on 1/28/2020.    We decided on switching to eribulin on 1/28/2020.    C#2 2/18/2020  C#2 D#8 2/25/2020    C#3 D#1 3/18/2020 -delayed by 1 week as per patient's preference because she wanted to visit her family.    She had a repeat PET/CT on 3/27/2020 which showed stable size of the bone metastatic lesions although the FDG avidity was less, consistent with treatment response.    She had MRI of the thoracic spine on 4/3/2020 and it was compared to the one which was done in August 2019 and it showed increased size of several metastatic lesions most notably at T9 but also at T5-T7.  Other lesions at T10, T11 and T12 appeared improved.    CA 27-29 was also down to 222 on 3/18/2020.    Cycle #4 eribulin ( dose reduced to 1.2 mg/m2 )-4/7/2020-   Cycle #5 Eribulin ( 1.2 mg/m2 )- 4/28/2020   Cycle #6 Eribulin ( 1.2 mg/m2 )- 5/19/2020     Cycle #7 Eribulin ( 1.2 mg/m2 )- 6/9/2020    Cycle #8 Eribulin ( 1.2 mg/m2 )- 6/30/2020     She had a repeat PET scan on 7/17/2020 which showed incidental finding of new acute bilateral pulmonary embolism in the distal left main pulmonary artery extending in the left upper and lower  lobes and in the segmental and branch arteries in the right lower lobe.    It also showed mildly increased FDG avidity in the lytic lesion in the T9 lamina and right pedicle with SUV max 5.3, previously 3.9.  That is also slightly increased FDG uptake to the left anterior fifth rib at the costochondral junction SUV max 3.9, previously 2.5.  There is slightly decreased FDG uptake in the right femoral neck lesion SUV max 2.2, previously 2.9.  FDG uptake in other bone lesions is fairly stable.  No new metastasis are seen.    CA 27-29 was 308 on 6/30/2020.  It was 286 on 5/19/2020.    Overall this is consistent with only slight progression versus stable disease.    She was started on Lovenox.    Cycle #9 eribulin 7/21/2020. CA 27-29 was 238    C#10 eribulin 8/18/2020. ( delayed by one week for ANC 0.9 )    C#11 Eribulin 9/8/2020    C#12 Eribulin 9/29/2020     C#13 Eribulin 10/20/2020     PET scan on 11/6/2020 shows progression of the disease with increased FDG uptake in the lytic lesions in the T9/T10 and right posterolateral elements as well as increased FDG uptake in the previously known hypermetabolic right iliac bone lesion suggesting recurrence of previously treated metastasis.  There is new subtle lesion in the right manubrium.  There is also a new fracture in the anterolateral left fourth rib with associated uptake and this could be a pathologic fracture.  Healing fracture in the left anterior fifth rib lesion.  There is resolution of the left pulmonary emboli.  Decreased FDG uptake of the esophagus consistent with improving inflammation.    CA 27-29 on 10/20/2020 was also elevated at 489.    C#1 Trodelvy on 11/11/2020.  Cycle #2 Trodelvy 12/2/2020  Cycle number 2-day #8 Trodelvy 12/8/2020.    12/15/2020-12/16/2020.  She was admitted to the hospital with nausea vomiting and dehydration.  She had tachycardia and initial lactic acid of 5.  It decreased to 1.4 after 2 L of normal saline.  She also had hypokalemia  and ANC was 1.3.  She was treated with IV fluids.  Procalcitonin was negative.  CTA of the chest was negative for pulmonary embolism or pneumonia.  No bacterial infection was documented.  Her medication which she was initially unable to tolerate at home, were restarted and she was discharged home once started to feel better.      We delayed the start of cycle number 3 x 1 week and decrease the dose of chemotherapy by 25%.  She also had neutropenia with ANC of 1.0.      She started cycle number 3-day #1 on 12/29/2020.  CA 27-29 was 392.    Cycle number 3-day #8 1/5/2021.    C#4 Trodelvy 1/20/2021- 75% dose    2/5/2021.  PET/CT overall shows a good response to treatment with improvement of previously hypermetabolic lesions in the spine and pelvis and stability of other bone meta stasis.  There is a single lytic lesion in the right iliac bone which demonstrates slight Yimi increased FDG uptake and has SUV max 4.7 while previously it was SUV max 3.4.  There was diffuse wall thickening of the esophagus with uptake in the esophagus in the fundus of the stomach and this could be seen with inflammation.    Trodelvy cycle #5 - 2/10/2021.  75% of the dose.  CA 27-29 was 337.    Trodelvy cycle #6 - 3/3/2021.  75% of the dose.  Trodelvy cycle #6, D#8 - 3/10/2021.  75% of the dose.    Trodelvy C#8, D#8 on 4/21/2021  CA 27-29 stable at 371 on 4/14/2021    Trodelvy, cycle #9- 5/5/2021        On 2/25/2020 when she had biopsy of the right acetabulum and it was consistent with metastatic lobular carcinoma.  Again it was Triple Negative.     On 3/18/2020 when she also met with Dr. Arelis Arshad who also advised testing for androgen receptor studies on the biopsy specimen.  Tumor was diffusely androgen receptor positive.      5/26/2021-cycle #10 Trodelvy started    CA 27-29 was 545.    6/11/2021.  PET/CT shows mildly increased uptake in several bone lesions for example in the left anterior iliac crest, mid right iliac crest and left  ischium.  Uptake in other bone lesions are similar to previously.    Because of minimal progression of the disease and she is tolerating chemotherapy very well, we decided on continuing the same chemotherapy.    6/16/2021-Trodelvy cycle #11    7/7/2021 -Trodelvy cycle #12    9/14/2021-Trodelvy-cycle #15, day #8.    9/8/2021-CA 27-29 was more elevated and it was 1176.    PET/CT on 9/24/2021 showed stable findings with no evidence of FDG avid metastatic disease within the body.  Left axillary lymph nodes are prominent and hypermetabolic and likely from recent vaccinations in the left deltoid muscle.  Healed bony metastasis seems stable.      Cycle #16, Trodelvy 9/28/2021.  Cycle #17, day #8 Trodelvy was on 10/26/2021.  Cycle #18, day #8 Trodelvy on 11/22/2021       She saw Dr. Mejia whom on 10/6/2021.  She was not considered eligible for Enfortumab trial.    Cycle #19, Trodelvy on 12/7/2021 12/21/2021.  PET/CT showed progression of bony metastatic disease.  There is increase hypermetabolism in both the right and left iliac bones and the sternum.  Other bone lesions are stable.    There is new scattered areas of groundglass throughout both the lungs and these findings are indeterminate but may represent an infectious etiology.  Interval resolution of left axillary FDG avid lymphadenopathy.    She was started on Casodex 150 mg daily on 1/6/2022.        She was admitted to the hospital from 3/17/2022-3/19/2022 for vomiting and diarrhea and  severe back pain.  I reviewed the discharge summary.  CT lumbar spine showed a stable severe chronic L1 pathologic compression fracture.  Stable mild compression deformity of superior endplate of L3 and superior endplate of L4.  The upper margin of the L4 fracture extends slightly into the spinal canal without high-grade canal stenosis and this is also stable.  Nondisplaced fractures coursing through the L3 pedicles bilaterally were seen which were not clearly seen previously  this could be acute and likely pathologic.  No extraspinal soft tissue abnormality.  Neurosurgery recommended conservative management.  Her pain was controlled and she was discharged home as she felt better with this.      3/31/2022-PET/CT shows progressive bone meta stasis with progressive FDG uptake in the following lesions. Left iliac crest lesion with max SUV of 7.2, previously 3.5. Right mid iliac crest lytic lesion shows maximum SUV of 7.8, previously 6.9.  T10 vertebral body mixed lytic and sclerotic lesion with an SUV of 7.1, previously not hypermetabolic. Thoracic vertebral bodies 8, 7, 5, and 4 also show hypermetabolic lesions were previously there was no hypermetabolism.  Some of the sclerotic osseous lesions are unchanged and do not show increased hypermetabolism compatible treated lesion such as a severe compression deformity at L1 as well as superior compression deformity at L4.  Medial right clavicle sclerotic lesion with SUV max of 4.8, previously not hypermetabolic. There is also right-sided sternal lesions.      4/14/2022--C#1- switch to single agent Doxil 50 mg per metered squared every 4 weeks.    5/13/2022-cycle #2- Doxil- dose reduced to 40 mg per metered square due to severe nausea/vomiting and migraines requiring IV fluids and IV antiemetics.    6/10/2022-cycle #3-Doxil 40 mg per metered square    7/1/2022- PET/CT shows resolution of the hypermetabolic skeletal metastasis.    7/6/2022-cycle #4-Doxil 40 mg per metered square    Interval history.  She is tolerating chemotherapy well.  Denies any significant nausea vomiting diarrhea constipation.  She developed shingles in the left axilla for which she took valacyclovir and prednisone.  She developed postherpetic neuralgia for which she is on gabapentin.  The rashes have now dried out.  No other infections.  No shortness of breath.  No new swellings.  No bleeding.  Energy has been good and she is riding her bike on average 50  miles a week.   Migraines are better and now takes Ubrelvy for breakthrough pain. Started Aimovig monthly.    Cancer related pain in her low back is under good control with buprenorphine patch and she has not used as needed morphine for quite some time now.    ECOG 0-1    ROS:  Rest of the comprehensive review of the system was negative.        I reviewed other history in epic as below.       PAST MEDICAL HISTORY:     1.  Breast cancer  2.  Multiple sclerosis.   3.  Depression.   4.  Migraines.   5.  Hypertension.   6.  Cholecystectomy and umbilical hernia repair.     SOCIAL HISTORY: She smoked for 5 years but quit many years ago.  Drinks alcohol socially.  She lives with her .  She is a teacher in middle school.       FAMILY HISTORY: She mentions that her mother had breast cancer at 49.  Both grandmothers had breast cancer but she is not sure of the age.  A couple of cousins have cancers.  Patient has 3 children who are healthy.    Current Outpatient Medications   Medication     acetaminophen (TYLENOL) 500 MG tablet     buprenorphine (BUTRANS) 10 MCG/HR WK patch     busPIRone (BUSPAR) 15 MG tablet     calcium citrate-vitamin D (CITRACAL) 315-250 MG-UNIT TABS     Cholecalciferol (VITAMIN D3 PO)     ELIQUIS ANTICOAGULANT 5 MG tablet     erenumab-aooe (AIMOVIG) 70 MG/ML injection     gabapentin (NEURONTIN) 100 MG capsule     [START ON 7/8/2022] gabapentin (NEURONTIN) 300 MG capsule     ibuprofen (ADVIL/MOTRIN) 600 MG tablet     Lidocaine (LIDOCARE) 4 % Patch     loratadine (CLARITIN) 10 MG tablet     LORazepam (ATIVAN) 1 MG tablet     magnesium 250 MG tablet     morphine (MSIR) 15 MG IR tablet     Nerve Stimulator (NERIVIO) CRISTOPHER     OLANZapine (ZYPREXA) 2.5 MG tablet     ondansetron (ZOFRAN-ODT) 8 MG ODT tab     predniSONE (DELTASONE) 20 MG tablet     prochlorperazine (COMPAZINE) 10 MG tablet     propranolol ER (INDERAL LA) 80 MG 24 hr capsule     senna-docusate (SENOKOT-S/PERICOLACE) 8.6-50 MG tablet     traZODone (DESYREL)  50 MG tablet     triamcinolone (KENALOG) 0.1 % external cream     ubrogepant (UBRELVY) 50 MG tablet     venlafaxine (EFFEXOR-XR) 75 MG 24 hr capsule     vitamin B-2 (RIBOFLAVIN) 25 MG TABS tablet     valACYclovir (VALTREX) 1000 mg tablet     No current facility-administered medications for this visit.     Facility-Administered Medications Ordered in Other Visits   Medication     heparin 100 UNIT/ML injection 5 mL     sodium chloride (PF) 0.9% PF flush 10 mL        Allergies   Allergen Reactions     Bactrim [Sulfamethoxazole W/Trimethoprim]      Internal bleeding     Copaxone [Glatiramer] Hives          PHYSICAL EXAMINATION:     /89 (BP Location: Left arm, Patient Position: Chair, Cuff Size: Adult Regular)   Pulse 69   SpO2 100%   Wt Readings from Last 4 Encounters:   06/26/22 57.6 kg (127 lb)   06/10/22 62.3 kg (137 lb 6.4 oz)   06/09/22 61.5 kg (135 lb 8 oz)   05/13/22 61 kg (134 lb 8 oz)       CONSTITUTIONAL: no acute distress  EYES: PERRLA, no palor or icterus.   ENT/MOUTH: no mouth lesions. Ears normal  CVS: s1s2 no m r g .   RESPIRATORY: clear to auscultation b/l  GI: soft non tender no hepatosplenomegaly  NEURO: AAOX3  Grossly non focal neuro exam  INTEGUMENT: Dry rash from previous shingles in the left axilla.    LYMPHATIC: no palpable cervical, supraclavicular, axillary or inguinal LAD  MUSCULOSKELETAL: Unremarkable. No bony tenderness.   EXTREMITIES: no edema  PSYCH: Mentation, mood and affect are normal. Decision making capacity is intact      Labs and Imaging.    Reviewed.    CBC shows hemoglobin 11.3.  WBC 3.0.  Preliminary ANC is 1.6.  Platelets 176.    Chemistry shows potassium 3.3.  Albumin 2.9.  Otherwise unremarkable.  CA 27-29 was 1992 on 6/10/2022  CA 27-29 was 3370 on 5/13/2022.  It was 5307 on 4/14/2022 ferritin Doxil was started.      CA 27-29 was 3146 on 3/1/2022.  This continues to increase.    9/28/2021.      Iron studies, ferritin is 101, iron 51, TIBC 268 and iron saturation index  19%.    7/1/2022.  PET/CT shows diffuse sclerotic and lytic osseous lesions without any abnormal FDG uptake in the skeleton, consistent with treated disease.  There is evaluation of previous hypermetabolic bone lesions.  Multiple chronic rib fracture deformities and stable compression fracture deformities of T6, L1 and L4.  No suspicious FDG uptake in the chest abdomen or pelvis.    3/31/2022-PET/CT shows progressive bone metastasis with progressive FDG uptake in the following lesions. Left iliac crest lesion with max SUV of 7.2, previously 3.5. Right mid iliac crest lytic lesion shows maximum SUV of 7.8, previously 6.9.  T10 vertebral body mixed lytic and sclerotic lesion with an SUV of 7.1, previously not hypermetabolic. Thoracic vertebral bodies 8, 7, 5, and 4 also show hypermetabolic lesions were previously there was no hypermetabolism.  Some of the sclerotic osseous lesions are unchanged and do not show increased hypermetabolism compatible treated lesion such as a severe compression deformity at L1 as well as superior compression deformity at L4.  Medial right clavicle sclerotic lesion with SUV max of 4.8, previously not hypermetabolic. There is also right-sided sternal lesions.      Biopsy from the right acetabulum shows metastatic lobular carcinoma.  It is triple negative.  Androgen receptor was diffusely positive (90%, moderate intensity) by immunohistochemistry. )  PD-L1 negative.    Foundation one showed CDH1 mutation (initially lobular cancer), CCND1 amplification ( equivocal ). FGF3, FGF4, FGF19 amplification ( Equivocal ). Most promising is CCND1 amplification with abemaciclib showing response in 4/10 patients. FGF3,FGF4 and FGF19 are targetable with pan-FGFR inhibitors.           ASSESSMENT AND PLAN:     Metastatic breast cancer negative breast cancer (androgen receptor positive ) with extensive involvement of the bones.  There is also a left lower lobe hypermetabolic lesion and mediastinal lymph nodes  which are hypermetabolic.  The biopsy from the right iliac bone is consistent with metastatic lobular breast cancer but it is hormone receptor and HER-2 negative and PDL 1 is also negative.    Foundation 1 testing did not reveal any actionable mutations.    She was intolerant to Xeloda and progressed on Taxol and eribulin.      We then switched to recently FDA approved sacituzumab govitecan-hziy (Trodelvy) for TNBC, based on the results of the phase II IMMU-132-01 clinical trial.   She started this on 11/11/2020.      She obtained a second opinion from Dr. Castillo from [facility] who recommended a repeat biopsy to be done to make sure that she really has triple negative breast cancer.      She also met with Dr. Arelis Arshad on 3/18/2021.      After 10 cycles of Trodelvy, she had PET/CT on 6/11/2021 which showed mildly increased uptake in some of the bone lesions while stability in other bone lesions.        After 15 cycles, repeat PET scan showed stable findings.  CA 27-29 has been increasing and it is 1176.      As she was tolerating the chemotherapy well and her quality of life has been decent and scans were stable although she had a rising CA 27-29, we decided on continuing the same chemotherapy because it has been a very slow progression of the disease.    Now there is clear progression of the disease in the bones.  There is increased FDG uptake in both right and left iliac bones and the sternum.    Foundation one showed CDH1 mutation (initially lobular cancer), CCND1 amplification ( equivocal ). FGF3, FGF4, FGF19 amplification ( Equivocal ). Most promising is CCND1 amplification with abemaciclib showing response in 4/10 patients. FGF3,FGF4 and FGF19 are targetable with pan-FGFR inhibitor    As the tumor is diffusely positive for androgen receptor, we decided to start her on androgen receptor blockers.    I initially recommended enzalutamide 160 mg daily ( Enzalutamide for the Treatment of Androgen  Receptor-Expressing Triple-Negative Breast Cancer----J Clin Oncol. 2018 Mar 20; 36(9): 884-890. ).    Eventually we decided to start her on Casodex 150 mg daily instead of Enzalutamide due to cost issues.  Clinical Trial Clin Cancer Res. 2013 Oct 1;19(19):5505-12.   Phase II trial of bicalutamide in patients with androgen receptor-positive, estrogen receptor-negative metastatic Breast Cancer  She started Casodex on 1/6/2022.    She had progressive disease in the bones on the PET scan on 3/31/2022.    We changed to single agent Doxil on 4/14/2022.      She developed significant nausea vomiting and headache so cycle #2 was given on 5/13/2022 with 20% dose reduction which she tolerated well.    Cycle #3 was given on 6/10/2022 with the same dose reduced Doxil.    Repeat PET/CT on 7/1/2022 shows resolution of the FDG avid bony metastasis consistent with treated disease.  No suspicious FDG uptake was seen in the chest abdomen and pelvis.  CA 27-29 was also coming down.  She is tolerating chemotherapy very well and we will proceed with cycle #4 today.    Baseline echocardiogram on 4/7/2022 was unremarkable with EF 60 to 65%.    Bone disease.  She has metastatic disease to the bone.  Previously she got palliative radiation to T12-L5 3000 cGy in 10 fractions from 9/6/2019 till 9/18/2019.  She was evaluated by orthopedics Dr. Bipin Elliott on 11/1/2019 and conservative management was recommended.    She was on Zometa every 3 months. She last received on 9/29/2020.  Because of progression of the disease in the bones, we switched to Xgeva which she received on 11/11/2020.    She had progression of the disease in the form so we switched to Doxil but we continued xgeva.  PET scan on 7/1/2022 does not show any suspicious FDG uptake in the bones consistent with treated disease.  Continue Xgeva calcium and vitamin D.        Leukopenia/neutropenia.  From chemotherapy.  Preliminary ANC is normal.  We will keep the same dose of  chemotherapy.  Continue to monitor.      Shingles.  The rash has dried out.  She completed well with acyclovir and prednisone.  She takes gabapentin for postherpetic neuralgia and is doing better.      Pain management.  Doing very well on buprenorphine patch.  She has not required as needed morphine recently.  Following with palliative care.         Migraines.   Doing much better.  She takes Aimovig monthly and takes Ubrelvy for breakthrough pain.  Follow with neurology.      Hypokalemia.  This is mild.  Advised her to increase intake of potassium rich foods like kiwi's, bananas, dates etc.    Nausea.  This was also under much better controlled with this cycle.  She takes antiemetics as needed.      Bilateral pulmonary emboli.  Continue Eliquis      We did not address the following today.    Constipation. Continue senna.    Neuropathy.  This remains mild and stable with neuropathy in her hands.    This is in addition to the chronic balance issues and heat and cold sensitivity from underlying multiple sclerosis which is also stable for years.  Continue to monitor.     Subcutaneous nodule in the scalp.  This looks like an epidermoid cyst.  She thinks it is a stable.  Continue to monitor.       Insomnia.  Continue trazodone.      Wall thickening of esophagus and FDG uptake of fundus of the stomach.  It could be in the setting of inflammation from recent nausea and vomiting.  Symptoms have resolved.  Continue to monitor clinically.    Vision changes.    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  It was thought that this could be due to Taxol as patient reported to the ophthalmologist that she was on Taxol in September 2019 when her symptoms started.  But she was not on Taxol in September 2019 when she started noticing the visual changes so Taxol is not responsible for this.  The first dose of Taxol was on 11/5/2019.   She had left cataract extraction on 2/8/2021 and she has noticed  significant improvement in her vision.       Increased FDG ability in the colon.  She had FDG uptake in the cecum and ascending colon but no corresponding lesion was seen on the CT scan.  She is completely asymptomatic so at this time we will continue to observe.    Discussion regarding healthcare directives.  On previous visit she told me that she will bring the health care directive for our records but she forgot so I told her to bring it on next visit.      Breast implant removal.  She tells me that she was planning to do breast implant removal because there was a recall on this.  Her surgery was scheduled for 9/24/2019.  Because of this new development of metastatic breast cancer and her recent radiation, surgery has been canceled.  We discussed that at this time treatment for metastatic breast cancer would take precedence over the other surgery as the other surgery would require her to be off chemotherapy for several weeks and in that case the chances of cancer progression would be high and I would not recommend that.    Multiple sclerosis.  She will continue to follow with her neurologist Dr. Quiles.  Currently multiple sclerosis is under control and currently she is not taking any medications.    See nurse practitioner before cycle #5 and see me back before cycle #6.      All of her questions were answered to her satisfaction.  She is agreeable and comfortable with the plan.    Dewayne Zepeda MD

## 2022-07-06 NOTE — PROGRESS NOTES
Infusion Nursing Note:  Shaneka Alvarez presents today for C4D1 Doxil and Xgeva.    Patient seen by provider today: Yes: Dr. Zepeda   present during visit today: Not Applicable.    Note: Patient reports feeling well overall today. States she has tolerated Doxil and Xgeva in the past. Reports she is taking calcium and vitamin D supplements.    Potassium level 3.3, replaced PO during infusion appointment.    Intravenous Access:  Implanted Port.    Treatment Conditions:  Lab Results   Component Value Date    HGB 11.3 (L) 07/06/2022    WBC 3.0 (L) 07/06/2022    ANEU 1.9 10/26/2021    ANEUTAUTO 1.7 07/06/2022     07/06/2022      Lab Results   Component Value Date     07/06/2022    POTASSIUM 3.3 (L) 07/06/2022    MAG 2.0 04/21/2022    CR 0.71 07/06/2022    RAFAELA 8.8 07/06/2022    BILITOTAL 0.3 07/06/2022    ALBUMIN 2.9 (L) 07/06/2022    ALT 30 07/06/2022    AST 22 07/06/2022     Results reviewed, labs MET treatment parameters, ok to proceed with treatment.    Post Infusion Assessment:  Patient tolerated infusion without incident.  Patient tolerated injection without incident.  Blood return noted pre and post infusion.  Site patent and intact, free from redness, edema or discomfort.  No evidence of extravasations.  Access discontinued per protocol.     Discharge Plan:   [facility]  Patient will return 8/5/2022 for next appointment.   Patient discharged in stable condition accompanied by: self.  Departure Mode: Ambulatory.      Devorah Parham RN

## 2022-07-06 NOTE — PROGRESS NOTES
Shaneka is a 62 year old who is being evaluated via a billable video visit.      How would you like to obtain your AVS? MyChart  If the video visit is dropped, the invitation should be resent by: Text to cell phone: [phone]  Will anyone else be joining your video visit? Claudia JOE        Palliative Care Outpatient Clinic Progress Note    Patient Name: Shaneka Alvarez is being evaluated via a billable video visit.    Primary Provider:  [facility], [facility]  Primary Oncologist: Dr Zepeda  Last seen by palliative care: myself, 5/11/22      Chief Complaint: feeling well    Background/Summary  Medical:  - breast cancer ER+/VA+, Her2 neg diagnosed in 2006              - s/p systemic treatment with adriamycin, cytoxan, avastin, aromatase inhibitor as well as b/l mastectomy              - relapsed metastatic disease found in skull, vertebrae complicated by pathological fractures L1, L5              - s/p XRT to T12-L5 Sept 2019. Didn't tolerate Xeloda, paclitaxel Nov 2019-Jan 2020, treated with eribulin and zometa. PD seen on PET Nov 2020 as well as a new L 4th rib concerning for a pathologic fracture. Started Trodelvy 11/11/20, PD Dec 21, switched to casodex.               - PD again on PET/CT March 2022 including b/l iliac crest lesions, R clavicle, and multifocal T spine involvement. Switched to single agent Doxil April 2022, dose decrease to 80% at 2nd cycle in light of side effects. Tolerated that well. Complete response on scan July 2022  - b/l PE found on PET July 2020, on anticoagulation  - MS with mild right-sided weakness    - long-standing history of migraines, follows with neurology. On aimovig with ubrelvy prn  - post-zoster neuralgia (ED Visit July 2022)        Care Planning   POLST completed 4/1/20: DNR/selective treatments    Opioid Safety   Expected prognosis: > 1 year  Risk: Low (ORT 0)  Indication for Opioid Use: Moderate or Strong  Naloxone Script provided if patient also on  benzodiazepine: yes  Indications for Tapering reviewed: good dose decrease with starting buprenorphine     Interim History:  At the last visit we discussed migraines (has seen neurology), nausea (added olanzapine, adjustment of pre-chemo antiemetics), pain (doing well with buprenorphine patch at 10mcg/h)    Patient is on the call by herself    Her migraines seem controlled with aimovig. She had some episodes prior to approval for aimovig, which responded well to ubrelvy.     Her pain is well controlled with buprenorphine 10mcg/h patch. She had to take one patch off early, since she developed zoster underneath.   She has slight sensitivity at the site of her buprenorphine patch at baseline. She does rotate sites. At this time she is ok with the patch.   Hasn't required morphine in the past few weeks    She had an episode of zoster, was started on gabapentin 100mg tid with plan to increase to 300mg in 2 days. She has had no side effects with 100mg tid and has already noticed relief.     Coping:  She is in good spirits, relieved that her cancer seems to be responding to treatments.     Social History:  Pertinent changes to social history/social situation since last visit: no changes        Impression & Recommendations & Counseliny/o woman with metastatic breast cancer on doxil    Pain:   A) migraines: well controlled now with Aimovig and prn ubrelvy  B) postherpetic neuralgia: improving, was started on gabapentin. I can take over prescribing for this. Since she already has improvement, she might not require full dose of 300mg tid and also might only need a relatively short course of gabapentin  C) cancer-related pain: continue buprenorphine 10mcg/h patch for now. Considering skin sensitivity, [facility] if this becomes intolerable.       Return to clinic in 8 weeks    Data / Chart Review:    Review of Systems:   ROS: 10 point ROS neg other than the symptoms noted above in the HPI and pertinents  here.         Physical Exam:   Physical Exam:  Vital Signs not checked, virtual visit    CONSTIT: awake, appears comfortable  EENT: MMM, EOMI, no icterus  RESP: reg, nl effort, no cough  SKIN: no rash, no obvious lesions  NEURO: alert, oriented x3  PSYCH: appropriate affect, memory and thought process intact    The rest of a comprehensive physical examination is deferred  due to public health emergency video visit restrictions.      Current pertinent medications:  Buprenorphine 10mcg/hr patch, weekly  MSIR 15-30mg q3h prn              Naloxone prescribed  Gabapentin 300mg tid  acetaminophen 1000mg tid  Ibuprofen 600mg q6h prn  CBD cream  Lidocaine patch     Dexamethasone 4mg q12h     Venlafaxine 225mg daily  Lorazepam 1mg prn  Trazodone 50mg HS  Buspirone 15mg bid     Olanzapine 2.5-5mg bid prn  Ondansetron prn, compazine prn  Senna bid prn, miralax bid prn    : ok      No pertinent allergies      Lab and imaging data reviewed:  Comments:   PET/CT 7/1/22: In this patient with history of breast cancer with osseous  metastases, there is complete response:  1. No suspicious osseous FDG uptake. Diffuse sclerotic and lytic  lesions of the skeleton, consistent with treated disease.  Resolution  of previous hypermetabolic bone lesions.  2. No suspicious FDG uptake to suggest metastatic disease.          Video-Visit Details    Type of service:  Video Visit    Physician has received verbal consent for a Video Visit from the patient? Yes    Video Start Time: 1434    Video End Time (time video stopped): 1448    Originating Location (pt. Location): Home    Distant Location (provider location):  [facility]     Mode of Communication:  Video Conference via [facility]    Alva Whitaker MD  Palliative Medicine  Pager [phone]

## 2022-07-06 NOTE — NURSING NOTE
"Oncology Rooming Note    July 6, 2022 7:45 AM   Shaneka Alvarez is a 62 year old female who presents for:    Chief Complaint   Patient presents with     Oncology Clinic Visit     Prior to tx     Initial Vitals: /89 (BP Location: Left arm, Patient Position: Chair, Cuff Size: Adult Regular)   Pulse 69   SpO2 100%  Estimated body mass index is 23.23 kg/m  as calculated from the following:    Height as of 6/26/22: 1.575 m (5' 2\").    Weight as of 6/26/22: 57.6 kg (127 lb). There is no height or weight on file to calculate BSA.  Mild Pain (2) Comment: Data Unavailable   No LMP recorded. Patient is postmenopausal.  Allergies reviewed: Yes  Medications reviewed: Yes    Medications: Medication refills not needed today.  Pharmacy name entered into [facility]:    [facility] - [place], [place] - [address] [place] [address] PHARMACY [id number] [place], [place] - [address] [id number] [place], [place] - [address] AT [facility] OF  & MAIN    Clinical concerns: NO Dr. Zepeda was notified.      Eliza Thakur CMA              "

## 2022-07-20 DIAGNOSIS — G43.009 MIGRAINE WITHOUT AURA AND WITHOUT STATUS MIGRAINOSUS, NOT INTRACTABLE: Primary | ICD-10-CM

## 2022-07-20 RX ORDER — NARATRIPTAN 2.5 MG/1
2.5 TABLET ORAL
Qty: 18 TABLET | Refills: 6 | Status: SHIPPED | OUTPATIENT
Start: 2022-07-20 | End: 2022-11-18

## 2022-08-04 NOTE — PROGRESS NOTES
Hematology/Oncology Visit    Oncologist: Dr. Zepeda  Diagnosis: metastatic (bone) triple negative breast cancer  Reason for visit: exam prior to cycle 5 liposomal doxorubicin    Cancer and Treatment Hx:  INITIAL DIAGNOSIS  Jan 2006: diagnosed with Stage IIB, T2 N1 M0 invasive lobular carcinoma of the right breast. Final pathology showed 4.5 x 3.8 x 2.5 cm, 1/14 lymph nodes positive. ER/NV+ and HER-2 negative. Genetics with VUSin MSH2 gene  2006: ECOG 2104 protocol of dose-dense AC + Avastin x4 cycles followed by Taxol and Avastin x4 cycles.   Jan 2007: completed radiation to right breast  Mar 5065-2831: Aromasin, stopped after moving to the [facility]  Jan 2016: bilateral mastectomy with flap reconstruction     RELAPSE  Aug 2019: MRI brain for multiple sclerosis found multiple calvarial lesions suspicious for metastatic disease and no new MS lesions. PET scan with widespread bone metastatic lesions (calvarium, spine, sacrum, pelvis, ribs most prominent at C7, T3, L1 and L4), hypermetabolic 3 cm lesion in LLL, and mediastinal/hilar LN. CEA at 1.9 and C27-29 at 415 (28 on 8/23/16). Biopsy of the right iliac bone was consistent with metastatic adenocarcinoma (breast), ER/NV negative, HER-2 negative PDL1 < 1%. A second biopsy of the LLL nodule via EBUS on 9/5/19 was negative for malignancy.  Sep 2019: MRI spine with numerous enhancing lesions of the spine, largest around T9 and L1, no cord compression, L1 compression fracture and impending L4. Neurosurgery recommended no surgical intervention but to wear [place] brace.  Sep 18, 2019: completed T12-L5 radiation  Oct 2019: Xeloda x 2 trials lowering dose to 1000 mg BID, every 3 month Zometa  Nov 2019- Jan 2020: weekly paclitaxel x 3 cycles  Jan- Oct 2020: Eribulin  Nov 2020- Dec 2021: Trodelvy, biopsy of R acetabulum in Feb with triple negative breast cancer, androgen receptor positive, PDL1 negative, no actionable mutations on Foundation 1 Jan- Mar 2022: bicalutamide with  bony progression  Apr 2020: liposomal doxorubicin     Interval History:  Shaneka continues to tolerate treatment well. Her hair is growing back in. Migraines have been improved with preventative treatment and she has used the abortive therapy a few times with resolution of migraine. Current pain control regimen is managing chronic back pain fairly well. She has been riding her bike regularly. Her appetite is effected by this treatment plan but she has been able to eat and drink regularly and is maintaining her weight. Is having regular BMs. Nausea is controlled with anti-emetics. She has noticed 2 hard lumps under her tongue, one on either side of her medial gums. Not painful. Has had blocked salivary glands in the past. She has also noticed more brown spots on her legs. Has a trip planned in October to go to [place].     Medications, imaging, and labs:  - Reviewed pertinent medications, refilled trazodone and sent viscous lidocaine.  - Reviewed CBC and CMP from today and new tumor marker  - Reviewed read of PET from 7/1/22:  IMPRESSION: In this patient with history of breast cancer with osseous  metastases, there is complete response:  1. No suspicious osseous FDG uptake. Diffuse sclerotic and lytic  lesions of the skeleton, consistent with treated disease.  Resolution  of previous hypermetabolic bone lesions.  2. No suspicious FDG uptake to suggest metastatic disease.    Physical Exam:  GEN: pleasantly conversant female no acute distress  SKIN: accessed port L chest no erythema, numerous tiny flat brown spots on legs consistent with freckles or sun spots  ENT: eyes non-icteric, no notable oral sores, firm nodule approx 0.25cm on inner gum under tongue, smaller firm nodule in same spot on opposite site, non-tender to palpation  LYMPH: no notable cervical or supraclavicular lymphadenopathy  RESP: diminished bases but clear to auscultation bilaterally, on room air  CARDS: regular rate and rhythm, no notable murmurs  "  GI: abd soft without notable hepatosplenomegaly, non-tender to palpation  MSK: no notable lower extremity edema  NEURO: alert and oriented without obvious focal deficit  /85 (BP Location: Left arm)   Pulse 83   Temp 98.3  F (36.8  C) (Oral)   Resp 16   Ht 1.575 m (5' 2\")   Wt 59.9 kg (132 lb 1.6 oz)   SpO2 100%   BMI 24.16 kg/m      Wt Readings from Last 4 Encounters:   08/05/22 59.9 kg (132 lb 1.6 oz)   06/26/22 57.6 kg (127 lb)   06/10/22 62.3 kg (137 lb 6.4 oz)   06/09/22 61.5 kg (135 lb 8 oz)     Assessment and Plan:  Metastatic breast cancer  - PET 7/1 without any FDG avidity, tumor marker has been downtrending  - Continues on liposomal doxorubicin previously dose reduced 20% for tolerability  - Meeting goals and labs meeting parameters to proceed with cycle 5 later today  - Follow up prior to each cycle with Oncology provider     Metastasis to bone  - Monthly Xgeva and continues on calcium and vitamin D supplementation     Nausea secondary to chemotherapy  - Improved with Emend as premed, Zyprexa twice daily, and as needed antiemetics     Migraine headaches  - Stable with use of Aimovig, has Amerge available for abortive therapy  - Followed by Migraine Clinic     Cancer related pain  - Stable with buprenorphine patch and morphine for breakthrough pain  - Followed by Palliative Medicine    Firm gum nodule under tongue  - Non-tender, seems more on gums vs salivary gland  - Recommended dental evaluation, unlikely related to cancer    Brown spots on legs  - Numerous, appear to be benign freckles/sun spots, not raised or itchy/painful  - Discussed sun sensitivity while on chemo  - Consider derm consult if any acute changes in new spots      The total time of this encounter amounted to 30 minutes today. This time includes face-to-face time spent with the patient, prep work, ordering tests, and performing post-visit documentation.  - Shannan Schilling, CNP    "

## 2022-08-05 ENCOUNTER — INFUSION THERAPY VISIT (OUTPATIENT)
Dept: INFUSION THERAPY | Facility: CLINIC | Age: 62
End: 2022-08-05

## 2022-08-05 ENCOUNTER — LAB (OUTPATIENT)
Dept: ONCOLOGY | Facility: CLINIC | Age: 62
End: 2022-08-05
Payer: MEDICARE

## 2022-08-05 ENCOUNTER — ONCOLOGY VISIT (OUTPATIENT)
Dept: ONCOLOGY | Facility: CLINIC | Age: 62
End: 2022-08-05

## 2022-08-05 VITALS
RESPIRATION RATE: 16 BRPM | HEIGHT: 62 IN | TEMPERATURE: 98.3 F | WEIGHT: 132.1 LBS | OXYGEN SATURATION: 100 % | BODY MASS INDEX: 24.31 KG/M2 | DIASTOLIC BLOOD PRESSURE: 85 MMHG | HEART RATE: 83 BPM | SYSTOLIC BLOOD PRESSURE: 118 MMHG

## 2022-08-05 VITALS — WEIGHT: 132.1 LBS | BODY MASS INDEX: 24.16 KG/M2

## 2022-08-05 DIAGNOSIS — D70.1 CHEMOTHERAPY-INDUCED NEUTROPENIA (H): ICD-10-CM

## 2022-08-05 DIAGNOSIS — E87.6 HYPOKALEMIA: ICD-10-CM

## 2022-08-05 DIAGNOSIS — C50.912 BILATERAL MALIGNANT NEOPLASM OF BREAST IN FEMALE, UNSPECIFIED ESTROGEN RECEPTOR STATUS, UNSPECIFIED SITE OF BREAST (H): ICD-10-CM

## 2022-08-05 DIAGNOSIS — C50.911 BILATERAL MALIGNANT NEOPLASM OF BREAST IN FEMALE, UNSPECIFIED ESTROGEN RECEPTOR STATUS, UNSPECIFIED SITE OF BREAST (H): Primary | ICD-10-CM

## 2022-08-05 DIAGNOSIS — E87.6 HYPOKALEMIA: Primary | ICD-10-CM

## 2022-08-05 DIAGNOSIS — C79.51 BONE METASTASES: ICD-10-CM

## 2022-08-05 DIAGNOSIS — K13.79 MOUTH SORE: ICD-10-CM

## 2022-08-05 DIAGNOSIS — T45.1X5A CHEMOTHERAPY-INDUCED NEUTROPENIA (H): ICD-10-CM

## 2022-08-05 DIAGNOSIS — C50.919 METASTATIC BREAST CANCER: Primary | ICD-10-CM

## 2022-08-05 DIAGNOSIS — C50.911 BILATERAL MALIGNANT NEOPLASM OF BREAST IN FEMALE, UNSPECIFIED ESTROGEN RECEPTOR STATUS, UNSPECIFIED SITE OF BREAST (H): ICD-10-CM

## 2022-08-05 DIAGNOSIS — C50.912 BILATERAL MALIGNANT NEOPLASM OF BREAST IN FEMALE, UNSPECIFIED ESTROGEN RECEPTOR STATUS, UNSPECIFIED SITE OF BREAST (H): Primary | ICD-10-CM

## 2022-08-05 DIAGNOSIS — C50.919 METASTATIC BREAST CANCER: ICD-10-CM

## 2022-08-05 DIAGNOSIS — G89.3 CANCER ASSOCIATED PAIN: ICD-10-CM

## 2022-08-05 DIAGNOSIS — G47.00 INSOMNIA, UNSPECIFIED TYPE: ICD-10-CM

## 2022-08-05 LAB
ALBUMIN SERPL-MCNC: 3.2 G/DL (ref 3.4–5)
ALP SERPL-CCNC: 69 U/L (ref 40–150)
ALT SERPL W P-5'-P-CCNC: 32 U/L (ref 0–50)
ANION GAP SERPL CALCULATED.3IONS-SCNC: 4 MMOL/L (ref 3–14)
AST SERPL W P-5'-P-CCNC: 29 U/L (ref 0–45)
BASOPHILS # BLD AUTO: 0 10E3/UL (ref 0–0.2)
BASOPHILS NFR BLD AUTO: 1 %
BILIRUB SERPL-MCNC: 0.3 MG/DL (ref 0.2–1.3)
BUN SERPL-MCNC: 8 MG/DL (ref 7–30)
CALCIUM SERPL-MCNC: 8.3 MG/DL (ref 8.5–10.1)
CANCER AG15-3 SERPL-ACNC: 286 U/ML
CHLORIDE BLD-SCNC: 106 MMOL/L (ref 94–109)
CO2 SERPL-SCNC: 28 MMOL/L (ref 20–32)
CREAT SERPL-MCNC: 0.67 MG/DL (ref 0.52–1.04)
EOSINOPHIL # BLD AUTO: 0.1 10E3/UL (ref 0–0.7)
EOSINOPHIL NFR BLD AUTO: 1 %
ERYTHROCYTE [DISTWIDTH] IN BLOOD BY AUTOMATED COUNT: 16.8 % (ref 10–15)
GFR SERPL CREATININE-BSD FRML MDRD: >90 ML/MIN/1.73M2
GLUCOSE BLD-MCNC: 94 MG/DL (ref 70–99)
HCT VFR BLD AUTO: 36.9 % (ref 35–47)
HGB BLD-MCNC: 12.1 G/DL (ref 11.7–15.7)
IMM GRANULOCYTES # BLD: 0.1 10E3/UL
IMM GRANULOCYTES NFR BLD: 2 %
LYMPHOCYTES # BLD AUTO: 0.6 10E3/UL (ref 0.8–5.3)
LYMPHOCYTES NFR BLD AUTO: 10 %
MCH RBC QN AUTO: 30.1 PG (ref 26.5–33)
MCHC RBC AUTO-ENTMCNC: 32.8 G/DL (ref 31.5–36.5)
MCV RBC AUTO: 92 FL (ref 78–100)
MONOCYTES # BLD AUTO: 1.1 10E3/UL (ref 0–1.3)
MONOCYTES NFR BLD AUTO: 19 %
NEUTROPHILS # BLD AUTO: 3.8 10E3/UL (ref 1.6–8.3)
NEUTROPHILS NFR BLD AUTO: 67 %
NRBC # BLD AUTO: 0 10E3/UL
NRBC BLD AUTO-RTO: 0 /100
PLATELET # BLD AUTO: 184 10E3/UL (ref 150–450)
POTASSIUM BLD-SCNC: 4.1 MMOL/L (ref 3.4–5.3)
PROT SERPL-MCNC: 6.6 G/DL (ref 6.8–8.8)
RBC # BLD AUTO: 4.02 10E6/UL (ref 3.8–5.2)
SODIUM SERPL-SCNC: 138 MMOL/L (ref 133–144)
WBC # BLD AUTO: 5.7 10E3/UL (ref 4–11)

## 2022-08-05 PROCEDURE — 99207 PR NO CHARGE LOS: CPT

## 2022-08-05 PROCEDURE — 85025 COMPLETE CBC W/AUTO DIFF WBC: CPT | Performed by: NURSE PRACTITIONER

## 2022-08-05 PROCEDURE — 99207 PR NO CHARGE NURSE ONLY: CPT

## 2022-08-05 PROCEDURE — 96372 THER/PROPH/DIAG INJ SC/IM: CPT | Mod: 59 | Performed by: NURSE PRACTITIONER

## 2022-08-05 PROCEDURE — 80053 COMPREHEN METABOLIC PANEL: CPT | Performed by: NURSE PRACTITIONER

## 2022-08-05 PROCEDURE — 96413 CHEMO IV INFUSION 1 HR: CPT | Performed by: NURSE PRACTITIONER

## 2022-08-05 PROCEDURE — 86300 IMMUNOASSAY TUMOR CA 15-3: CPT | Performed by: NURSE PRACTITIONER

## 2022-08-05 PROCEDURE — 99214 OFFICE O/P EST MOD 30 MIN: CPT | Mod: 25 | Performed by: NURSE PRACTITIONER

## 2022-08-05 PROCEDURE — 96367 TX/PROPH/DG ADDL SEQ IV INF: CPT | Performed by: NURSE PRACTITIONER

## 2022-08-05 RX ORDER — HEPARIN SODIUM (PORCINE) LOCK FLUSH IV SOLN 100 UNIT/ML 100 UNIT/ML
500 SOLUTION INTRAVENOUS
Status: DISCONTINUED | OUTPATIENT
Start: 2022-08-05 | End: 2022-08-05 | Stop reason: HOSPADM

## 2022-08-05 RX ORDER — EPINEPHRINE 1 MG/ML
0.3 INJECTION, SOLUTION INTRAMUSCULAR; SUBCUTANEOUS EVERY 5 MIN PRN
Status: CANCELLED | OUTPATIENT
Start: 2022-08-05

## 2022-08-05 RX ORDER — METHYLPREDNISOLONE SODIUM SUCCINATE 125 MG/2ML
125 INJECTION, POWDER, LYOPHILIZED, FOR SOLUTION INTRAMUSCULAR; INTRAVENOUS
Status: CANCELLED
Start: 2022-08-05

## 2022-08-05 RX ORDER — NALOXONE HYDROCHLORIDE 0.4 MG/ML
0.2 INJECTION, SOLUTION INTRAMUSCULAR; INTRAVENOUS; SUBCUTANEOUS
Status: CANCELLED | OUTPATIENT
Start: 2022-08-05

## 2022-08-05 RX ORDER — TRAZODONE HYDROCHLORIDE 50 MG/1
TABLET, FILM COATED ORAL
Qty: 60 TABLET | Refills: 1 | Status: SHIPPED | OUTPATIENT
Start: 2022-08-05 | End: 2022-11-30

## 2022-08-05 RX ORDER — HEPARIN SODIUM,PORCINE 10 UNIT/ML
5 VIAL (ML) INTRAVENOUS
Status: CANCELLED | OUTPATIENT
Start: 2022-08-05

## 2022-08-05 RX ORDER — MEPERIDINE HYDROCHLORIDE 25 MG/ML
25 INJECTION INTRAMUSCULAR; INTRAVENOUS; SUBCUTANEOUS EVERY 30 MIN PRN
Status: CANCELLED | OUTPATIENT
Start: 2022-08-05

## 2022-08-05 RX ORDER — DIPHENHYDRAMINE HYDROCHLORIDE 50 MG/ML
50 INJECTION INTRAMUSCULAR; INTRAVENOUS
Status: CANCELLED
Start: 2022-08-05

## 2022-08-05 RX ORDER — ALBUTEROL SULFATE 90 UG/1
1-2 AEROSOL, METERED RESPIRATORY (INHALATION)
Status: CANCELLED
Start: 2022-08-05

## 2022-08-05 RX ORDER — HEPARIN SODIUM (PORCINE) LOCK FLUSH IV SOLN 100 UNIT/ML 100 UNIT/ML
5 SOLUTION INTRAVENOUS
Status: DISCONTINUED | OUTPATIENT
Start: 2022-08-05 | End: 2022-08-05 | Stop reason: HOSPADM

## 2022-08-05 RX ORDER — HEPARIN SODIUM (PORCINE) LOCK FLUSH IV SOLN 100 UNIT/ML 100 UNIT/ML
5 SOLUTION INTRAVENOUS
Status: CANCELLED | OUTPATIENT
Start: 2022-08-05

## 2022-08-05 RX ORDER — LIDOCAINE HYDROCHLORIDE 20 MG/ML
5 SOLUTION OROPHARYNGEAL
Qty: 100 ML | Refills: 1 | Status: SHIPPED | OUTPATIENT
Start: 2022-08-05 | End: 2022-11-18

## 2022-08-05 RX ORDER — ALBUTEROL SULFATE 0.83 MG/ML
2.5 SOLUTION RESPIRATORY (INHALATION)
Status: CANCELLED | OUTPATIENT
Start: 2022-08-05

## 2022-08-05 RX ADMIN — HEPARIN SODIUM (PORCINE) LOCK FLUSH IV SOLN 100 UNIT/ML 500 UNITS: 100 SOLUTION at 08:13

## 2022-08-05 RX ADMIN — HEPARIN SODIUM (PORCINE) LOCK FLUSH IV SOLN 100 UNIT/ML 5 ML: 100 SOLUTION at 11:03

## 2022-08-05 ASSESSMENT — PAIN SCALES - GENERAL
PAINLEVEL: MILD PAIN (3)
PAINLEVEL: MILD PAIN (3)

## 2022-08-05 NOTE — PROGRESS NOTES
Infusion Nursing Note:  Shaneka Alvarez presents today for C5D1 Doxil and Xgeva injection.    Patient seen by provider today: Yes: Shannan   present during visit today: Not Applicable.    Note: Pt tolerating treatment well since dose reduction. Verified she is taking her calcium and vitamin D.    Intravenous Access:  Implanted Port.    Treatment Conditions:  Lab Results   Component Value Date    HGB 12.1 08/05/2022    WBC 5.7 08/05/2022    ANEU 1.9 10/26/2021    ANEUTAUTO 3.8 08/05/2022     08/05/2022      Lab Results   Component Value Date     08/05/2022    POTASSIUM 4.1 08/05/2022    MAG 2.0 04/21/2022    CR 0.67 08/05/2022    RAFAELA 8.3 (L) 08/05/2022    BILITOTAL 0.3 08/05/2022    ALBUMIN 3.2 (L) 08/05/2022    ALT 32 08/05/2022    AST 29 08/05/2022     Results reviewed, labs MET treatment parameters, ok to proceed with treatment.    Post Infusion Assessment:  Patient tolerated infusion without incident.  Patient tolerated injection into L arm without incident.  Blood return noted pre and post infusion.  Site patent and intact, free from redness, edema or discomfort.  No evidence of extravasations.  Access discontinued per protocol.     Discharge Plan:   Discharge instructions reviewed with: Patient.  Patient and/or family verbalized understanding of discharge instructions and all questions answered.  AVS to patient via [facility].  Patient will return 9/2 for next appointment.   Patient discharged in stable condition accompanied by: self.  Departure Mode: Ambulatory.      Laura Duncan RN

## 2022-08-05 NOTE — NURSING NOTE
"Oncology Rooming Note    August 5, 2022 8:36 AM   Shaneka Alvarez is a 62 year old female who presents for:    Chief Complaint   Patient presents with     Oncology Clinic Visit     Prior to treatment       Initial Vitals: /85 (BP Location: Left arm)   Pulse 83   Temp 98.3  F (36.8  C) (Oral)   Resp 16   Ht 1.575 m (5' 2\")   Wt 59.9 kg (132 lb 1.6 oz)   SpO2 100%   BMI 24.16 kg/m   Estimated body mass index is 24.16 kg/m  as calculated from the following:    Height as of this encounter: 1.575 m (5' 2\").    Weight as of this encounter: 59.9 kg (132 lb 1.6 oz). Body surface area is 1.62 meters squared.  Mild Pain (3) Comment: Data Unavailable   No LMP recorded. Patient is postmenopausal.  Allergies reviewed: Yes  Medications reviewed: Yes    Medications: MEDICATION REFILLS NEEDED TODAY. Provider was notified.  Pharmacy name entered into [facility]:    [facility] - [place], [place] - [address] [place] [address] PHARMACY [id number] - [place], [place] - [address] [id number] [place], [place] - [address] AT [facility] OF  & MAIN    Clinical concerns: lump in mouth- feels like something hard on the jawbone.       Yuni Botello LPN              "

## 2022-08-05 NOTE — PROGRESS NOTES
Port site scrubbed with Chloraprep for 30 seconds. Accessed port using sterile technique. Two greens and purple tubes drawn. Double signed by patient and RN. See documentation flowsheet. Port needle left accessed for treatment. Tolerated port access and draw without complaint. Lauren Shaw RN on 8/5/2022 at 8:03 AM

## 2022-08-22 DIAGNOSIS — G89.3 CANCER ASSOCIATED PAIN: Primary | ICD-10-CM

## 2022-08-22 NOTE — TELEPHONE ENCOUNTER
Pt reporting significant skin irritation with use of the butrans patch. Update given to Dr. Whitaker. Will change to belbuca 75 mcg BID.

## 2022-08-29 ENCOUNTER — TELEPHONE (OUTPATIENT)
Dept: ONCOLOGY | Facility: CLINIC | Age: 62
End: 2022-08-29

## 2022-08-29 NOTE — TELEPHONE ENCOUNTER
Prior Authorization Approval    Authorization Effective Date: 7/30/2022  Authorization Expiration Date: 12/31/2099  Medication: Buprenorphine HCl (BELBUCA) 75 MCG FILM buccal film-PA approved  Approved Dose/Quantity:   Reference #:     Insurance Company: [employer] - Phone [phone] Fax [phone]  Expected CoPay:       CoPay Card Available:      Foundation Assistance Needed:    Which Pharmacy is filling the prescription (Not needed for infusion/clinic administered): [facility] [address] [place] [address] [facility]  Pharmacy Notified: Yes  Patient Notified: No

## 2022-08-29 NOTE — TELEPHONE ENCOUNTER
Central Prior Authorization Team   Phone: [phone]      PA Initiation    Medication: Buprenorphine HCl (BELBUCA) 75 MCG FILM buccal film-PA initiated  Insurance Company: [employer] - Phone [phone] Fax [phone]  Pharmacy Filling the Rx: [facility] [address] [place] [address] [facility]  Filling Pharmacy Phone: [phone]  Filling Pharmacy Fax:    Start Date: 8/29/2022

## 2022-08-31 NOTE — PROGRESS NOTES
Oncology Follow Up Visit: September 1, 2022     Oncologist: Dr Dewayne Zepeda  PCP: Mohit Barcenas    Diagnosis: Metastatic Breast Cancer to brain and spine  Shaneka Alvarez is a 63 yo female who was diagnosed with Stage IIB, T2 N1 M0 invasive lobular carcinoma of the right breast  In January 2006.  Final pathology showed a 4.5 x 3.8 x 2.5 cm, 01/14 lymph nodes positive.  Estrogen, progesterone receptor positive, HER-2 negative.     Genetics- BRCA1 and 2 mutations not detected. 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  There was no evidence of new multiple sclerosis lesions.  PET scan on 8/30/2019 which showed widespread bone metastatic lesions (calvarium, spine, sacrum, pelvis, ribs most prominent at C7, T3, L1 and L4), hypermetabolic 3 cm lesion in LLL, and mediastinal/hilar LN. CEA wnl at 1.9 and C27-29 elevated to 415 (28 on 8/23/16). A CT guided biopsy of the right iliac bone was obtained on 9/4/19, pathology consistent with metastatic adenocarcinoma (breast), ER/MI negative, HER-2 negative PDL 1 < 1%. A second biopsy was take of the LLL nodule via EBUS on 9/5/19 did not show any malignancy.  C/T/L spine MRI 8/3/19 to 9/2/19 with numerous enhancing lesions of the C, T and L spine, largest around T9 and L1. No evidence of cord compression. Has a L1 compression fracture and impending L4. completed radiation therapy to T12-L5.-Neurosurgery recommended no surgical intervention but to wear Gorge brace when out of bed and HOB >30 degrees  Treatment:   2006:  ECOG 2104 protocol of dose-dense Adriamycin, Cytoxan and Avastin x4 cycles followed by Taxol and Avastin x4 cycles.   03/2007:  Completed 1 year Avastin.   11/2006-01/2007:  Radiotherapy to the right breast of 5040 cGy.   [id number]:  Aromasin.  Stopped after moving to the [place].   01/2016:  Right  modified radical mastectomy with latissimus dorsi flap reconstruction and a left prophylactic mastectomy with latissimus dorsi flap reconstruction.   9/6/2019 till 9/18/2019 Received radiation T12-L5 3000 cGy in 10 fractions   -Neurosurgery recommended no surgical intervention but to wear Gorge brace when out of bed and HOB >30 degrees  9/18/2019 completed T12-L5 radiation in 10 fractions =3000 cGy  10/1/2019 Xeloda x 2 trials lowering dose to 1000 mg bid  10/23/2019 Began every 3 month Zometa  11/5/2019- 1/14/2020  weekly paclitaxel x 3 cycles  1/28-10/2020 Eribulin  11/11/2020- 12/2021 Trodelvy  Repeat biopsy done from the right acetabulum on 2/25/2021 which showed metastatic lobular breast cancer.  Hormonal studies, HER-2 FISH again showed that it is triple negative.  Androgen receptor is diffusely positive.    PD-L1 negative.  Foundation 1 testing did not reveal any actionable mutations  1-3/2022  bicalutamide with bony progression  4/202 began liposomal doxorubicin     Interval History: Ms. Alvarez is contacted today prior to cycle 6 of Doxil. Pt reporting she has been tolerating treatment well without new symptoms but later shares she has had some insomnia that is random- using Nyguil if needed and is working well. She is eating well with weight at normal. She continues with intermittent migraines but is happy with current medications and is handling them well. Nausea well controlled with Emend. Seeing palliative for pain control to back and working with Buccal Butrans with good results at this time.  Feels she is eating well and uses supplements to help with intake.   Rest of comprehensive and complete ROS is reviewed and is negative.   Past Medical History:   Diagnosis Date     Breast cancer (H)     Age 44     Cataract     left eye     Chemotherapy induced nausea and vomiting 12/15/2020     Common migraine      Esophageal reflux      H/O bilateral mastectomy      Mild major depression (H)      Multiple  "sclerosis (H)      Pulmonary embolism (H)      Current Outpatient Medications   Medication     acetaminophen (TYLENOL) 500 MG tablet     Buprenorphine HCl (BELBUCA) 75 MCG FILM buccal film     busPIRone (BUSPAR) 15 MG tablet     calcium citrate-vitamin D (CITRACAL) 315-250 MG-UNIT TABS     Cholecalciferol (VITAMIN D3 PO)     ELIQUIS ANTICOAGULANT 5 MG tablet     erenumab-aooe (AIMOVIG) 70 MG/ML injection     ibuprofen (ADVIL/MOTRIN) 600 MG tablet     Lidocaine (LIDOCARE) 4 % Patch     lidocaine, viscous, (XYLOCAINE) 2 % solution     loratadine (CLARITIN) 10 MG tablet     LORazepam (ATIVAN) 1 MG tablet     magnesium 250 MG tablet     morphine (MSIR) 15 MG IR tablet     naratriptan (AMERGE) 2.5 MG tablet     Nerve Stimulator (NERIVIO) CRISTOPHER     OLANZapine (ZYPREXA) 2.5 MG tablet     ondansetron (ZOFRAN-ODT) 8 MG ODT tab     prochlorperazine (COMPAZINE) 10 MG tablet     propranolol ER (INDERAL LA) 80 MG 24 hr capsule     senna-docusate (SENOKOT-S/PERICOLACE) 8.6-50 MG tablet     traZODone (DESYREL) 50 MG tablet     venlafaxine (EFFEXOR-XR) 75 MG 24 hr capsule     vitamin B-2 (RIBOFLAVIN) 25 MG TABS tablet     gabapentin (NEURONTIN) 100 MG capsule     gabapentin (NEURONTIN) 300 MG capsule     predniSONE (DELTASONE) 20 MG tablet     triamcinolone (KENALOG) 0.1 % external cream     ubrogepant (UBRELVY) 50 MG tablet     valACYclovir (VALTREX) 1000 mg tablet     No current facility-administered medications for this visit.     Facility-Administered Medications Ordered in Other Visits   Medication     denosumab (XGEVA) injection 120 mg     DOXOrubicin liposome (DOXIL) 70 mg in D5W 310 mL infusion     heparin 100 UNIT/ML injection 5 mL     Allergies   Allergen Reactions     Bactrim [Sulfamethoxazole W/Trimethoprim]      Internal bleeding     Copaxone [Glatiramer] Hives       Physical Exam:/74 (BP Location: Left arm)   Pulse 84   Temp 98.8  F (37.1  C) (Oral)   Resp 16   Ht 1.575 m (5' 2.01\")   Wt 59.3 kg (130 lb 11.2 " oz)   SpO2 100%   BMI 23.90 kg/m     Constitutional: Alert, healthy, and in no distress.   ENT: Eyes bright, No mouth sores  Neck: Supple, No adenopathy.Thyroid symmetric  Cardiac: Heart rate and rhythm is regular and strong without murmur  Respiratory: Breathing easy. Lung sounds clear to auscultation  Abdomen: Soft, non-tender, BS normal. No masses or organomegaly  MS: Muscle tone normal, extremities normal with no edema. Walking  Skin: No suspicious lesions or rashes  Neuro: Sensory grossly WNL, gait normal.   Lymph: Normal ant/post cervical, axillary, supraclavicular nodes  Psych: Mentation appears normal and affect normal/bright and smiling    Laboratory Results:   Results for orders placed or performed in visit on 09/01/22   Comprehensive metabolic panel     Status: Abnormal   Result Value Ref Range    Sodium 137 133 - 144 mmol/L    Potassium 3.5 3.4 - 5.3 mmol/L    Chloride 100 94 - 109 mmol/L    Carbon Dioxide (CO2) 31 20 - 32 mmol/L    Anion Gap 6 3 - 14 mmol/L    Urea Nitrogen 9 7 - 30 mg/dL    Creatinine 0.71 0.52 - 1.04 mg/dL    Calcium 9.4 8.5 - 10.1 mg/dL    Glucose 113 (H) 70 - 99 mg/dL    Alkaline Phosphatase 79 40 - 150 U/L    AST 29 0 - 45 U/L    ALT 25 0 - 50 U/L    Protein Total 6.7 (L) 6.8 - 8.8 g/dL    Albumin 3.3 (L) 3.4 - 5.0 g/dL    Bilirubin Total 0.4 0.2 - 1.3 mg/dL    GFR Estimate >90 >60 mL/min/1.73m2   CBC with platelets and differential     Status: Abnormal   Result Value Ref Range    WBC Count 3.1 (L) 4.0 - 11.0 10e3/uL    RBC Count 4.00 3.80 - 5.20 10e6/uL    Hemoglobin 12.1 11.7 - 15.7 g/dL    Hematocrit 36.8 35.0 - 47.0 %    MCV 92 78 - 100 fL    MCH 30.3 26.5 - 33.0 pg    MCHC 32.9 31.5 - 36.5 g/dL    RDW 14.6 10.0 - 15.0 %    Platelet Count 183 150 - 450 10e3/uL    % Neutrophils 54 %    % Lymphocytes 12 %    % Monocytes 26 %    % Eosinophils 5 %    % Basophils 1 %    % Immature Granulocytes 2 %    NRBCs per 100 WBC 0 <1 /100    Absolute Neutrophils 1.7 1.6 - 8.3 10e3/uL     Absolute Lymphocytes 0.4 (L) 0.8 - 5.3 10e3/uL    Absolute Monocytes 0.8 0.0 - 1.3 10e3/uL    Absolute Eosinophils 0.2 0.0 - 0.7 10e3/uL    Absolute Basophils 0.0 0.0 - 0.2 10e3/uL    Absolute Immature Granulocytes 0.1 <=0.4 10e3/uL    Absolute NRBCs 0.0 10e3/uL   RBC and Platelet Morphology     Status: None   Result Value Ref Range    Platelet Assessment  Automated Count Confirmed. Platelet morphology is normal.     Automated Count Confirmed. Platelet morphology is normal.    RBC Morphology Confirmed RBC Indices    CBC with platelets differential     Status: Abnormal    Narrative    The following orders were created for panel order CBC with platelets differential.  Procedure                               Abnormality         Status                     ---------                               -----------         ------                     CBC with platelets and d...[[id number]]  Abnormal            Final result               RBC and Platelet Morphology[[id number]]                      Final result                 Please view results for these tests on the individual orders.     Assessment and Plan:   Metastatic Breast Cancer to brain and spine-Pt continues with Doxil every 28 days and feels she is tolerating well. Meeting goals and will start cycle 6 today.  She will be meeting with SHAYNE prior to cycle 7.  Last imaging was 7/1/2022- will be due in October.   Back pain/bone metastasis-  Pt previously completed radiation therapy to T12-L5. Seeing palliative care for help with pain- using Buprenorphine every 12 hours with lidocaine patches and MSIR 15 mg tabs every 3 hours as needed. Feeling well controlled at this time.   Bone mets-. She continues using Xgeva monthly.  She confirmed use of calcium plus vitamin D for support.    Bilateral pulmonary embolism-  Found 7/2020 with imaging. Continues on Eliquis bid-no new symptoms of concern.   Migraine- pt now using propranolol daily and Aimovig plus Ubrelvy or Amerge for onset  of headaches and feels this is working well.   Anxiety/Depression-continues using BuSpar and Effexor for help with the anxiety and has trazodone for bedtime.   MS - currently not on treatment for her MS - no new or recurrent symptoms- has not seen neurology for > 1 year.   The total time of this encounter amounted to 30 minutes. This time included face to face time spent with the patient, prep work, ordering tests, and performing post visit documentation.  Annie Bailon,Cnp

## 2022-09-01 ENCOUNTER — INFUSION THERAPY VISIT (OUTPATIENT)
Dept: INFUSION THERAPY | Facility: CLINIC | Age: 62
End: 2022-09-01

## 2022-09-01 ENCOUNTER — LAB (OUTPATIENT)
Dept: ONCOLOGY | Facility: CLINIC | Age: 62
End: 2022-09-01
Payer: MEDICARE

## 2022-09-01 ENCOUNTER — ONCOLOGY VISIT (OUTPATIENT)
Dept: ONCOLOGY | Facility: CLINIC | Age: 62
End: 2022-09-01

## 2022-09-01 VITALS
DIASTOLIC BLOOD PRESSURE: 74 MMHG | OXYGEN SATURATION: 100 % | HEIGHT: 62 IN | RESPIRATION RATE: 16 BRPM | HEART RATE: 84 BPM | TEMPERATURE: 98.8 F | WEIGHT: 130.7 LBS | BODY MASS INDEX: 24.05 KG/M2 | SYSTOLIC BLOOD PRESSURE: 115 MMHG

## 2022-09-01 DIAGNOSIS — E87.6 HYPOKALEMIA: Primary | ICD-10-CM

## 2022-09-01 DIAGNOSIS — T45.1X5A CHEMOTHERAPY-INDUCED NEUTROPENIA (H): ICD-10-CM

## 2022-09-01 DIAGNOSIS — C50.912 BILATERAL MALIGNANT NEOPLASM OF BREAST IN FEMALE, UNSPECIFIED ESTROGEN RECEPTOR STATUS, UNSPECIFIED SITE OF BREAST (H): ICD-10-CM

## 2022-09-01 DIAGNOSIS — C50.919 METASTATIC BREAST CANCER: ICD-10-CM

## 2022-09-01 DIAGNOSIS — T45.1X5A CHEMOTHERAPY-INDUCED NEUTROPENIA (H): Primary | ICD-10-CM

## 2022-09-01 DIAGNOSIS — K13.79 MOUTH SORE: ICD-10-CM

## 2022-09-01 DIAGNOSIS — C50.911 BILATERAL MALIGNANT NEOPLASM OF BREAST IN FEMALE, UNSPECIFIED ESTROGEN RECEPTOR STATUS, UNSPECIFIED SITE OF BREAST (H): ICD-10-CM

## 2022-09-01 DIAGNOSIS — G89.3 CANCER ASSOCIATED PAIN: ICD-10-CM

## 2022-09-01 DIAGNOSIS — D70.1 CHEMOTHERAPY-INDUCED NEUTROPENIA (H): ICD-10-CM

## 2022-09-01 DIAGNOSIS — C79.51 BONE METASTASES: ICD-10-CM

## 2022-09-01 DIAGNOSIS — G47.00 INSOMNIA, UNSPECIFIED TYPE: Primary | ICD-10-CM

## 2022-09-01 DIAGNOSIS — D70.1 CHEMOTHERAPY-INDUCED NEUTROPENIA (H): Primary | ICD-10-CM

## 2022-09-01 DIAGNOSIS — E87.6 HYPOKALEMIA: ICD-10-CM

## 2022-09-01 DIAGNOSIS — G35 MULTIPLE SCLEROSIS (H): ICD-10-CM

## 2022-09-01 DIAGNOSIS — G43.009 MIGRAINE WITHOUT AURA AND WITHOUT STATUS MIGRAINOSUS, NOT INTRACTABLE: ICD-10-CM

## 2022-09-01 DIAGNOSIS — F41.9 ANXIETY: ICD-10-CM

## 2022-09-01 LAB
ALBUMIN SERPL-MCNC: 3.3 G/DL (ref 3.4–5)
ALP SERPL-CCNC: 79 U/L (ref 40–150)
ALT SERPL W P-5'-P-CCNC: 25 U/L (ref 0–50)
ANION GAP SERPL CALCULATED.3IONS-SCNC: 6 MMOL/L (ref 3–14)
AST SERPL W P-5'-P-CCNC: 29 U/L (ref 0–45)
BASOPHILS # BLD AUTO: 0 10E3/UL (ref 0–0.2)
BASOPHILS NFR BLD AUTO: 1 %
BILIRUB SERPL-MCNC: 0.4 MG/DL (ref 0.2–1.3)
BUN SERPL-MCNC: 9 MG/DL (ref 7–30)
CALCIUM SERPL-MCNC: 9.4 MG/DL (ref 8.5–10.1)
CANCER AG15-3 SERPL-ACNC: 239 U/ML
CHLORIDE BLD-SCNC: 100 MMOL/L (ref 94–109)
CO2 SERPL-SCNC: 31 MMOL/L (ref 20–32)
CREAT SERPL-MCNC: 0.71 MG/DL (ref 0.52–1.04)
EOSINOPHIL # BLD AUTO: 0.2 10E3/UL (ref 0–0.7)
EOSINOPHIL NFR BLD AUTO: 5 %
ERYTHROCYTE [DISTWIDTH] IN BLOOD BY AUTOMATED COUNT: 14.6 % (ref 10–15)
GFR SERPL CREATININE-BSD FRML MDRD: >90 ML/MIN/1.73M2
GLUCOSE BLD-MCNC: 113 MG/DL (ref 70–99)
HCT VFR BLD AUTO: 36.8 % (ref 35–47)
HGB BLD-MCNC: 12.1 G/DL (ref 11.7–15.7)
IMM GRANULOCYTES # BLD: 0.1 10E3/UL
IMM GRANULOCYTES NFR BLD: 2 %
LYMPHOCYTES # BLD AUTO: 0.4 10E3/UL (ref 0.8–5.3)
LYMPHOCYTES NFR BLD AUTO: 12 %
MCH RBC QN AUTO: 30.3 PG (ref 26.5–33)
MCHC RBC AUTO-ENTMCNC: 32.9 G/DL (ref 31.5–36.5)
MCV RBC AUTO: 92 FL (ref 78–100)
MONOCYTES # BLD AUTO: 0.8 10E3/UL (ref 0–1.3)
MONOCYTES NFR BLD AUTO: 26 %
NEUTROPHILS # BLD AUTO: 1.7 10E3/UL (ref 1.6–8.3)
NEUTROPHILS NFR BLD AUTO: 54 %
NRBC # BLD AUTO: 0 10E3/UL
NRBC BLD AUTO-RTO: 0 /100
PLAT MORPH BLD: NORMAL
PLATELET # BLD AUTO: 183 10E3/UL (ref 150–450)
POTASSIUM BLD-SCNC: 3.5 MMOL/L (ref 3.4–5.3)
PROT SERPL-MCNC: 6.7 G/DL (ref 6.8–8.8)
RBC # BLD AUTO: 4 10E6/UL (ref 3.8–5.2)
RBC MORPH BLD: NORMAL
SODIUM SERPL-SCNC: 137 MMOL/L (ref 133–144)
WBC # BLD AUTO: 3.1 10E3/UL (ref 4–11)

## 2022-09-01 PROCEDURE — 96413 CHEMO IV INFUSION 1 HR: CPT | Performed by: NURSE PRACTITIONER

## 2022-09-01 PROCEDURE — 96372 THER/PROPH/DIAG INJ SC/IM: CPT | Mod: 59 | Performed by: NURSE PRACTITIONER

## 2022-09-01 PROCEDURE — 96367 TX/PROPH/DG ADDL SEQ IV INF: CPT | Performed by: NURSE PRACTITIONER

## 2022-09-01 PROCEDURE — 99207 PR NO CHARGE LOS: CPT

## 2022-09-01 PROCEDURE — 85025 COMPLETE CBC W/AUTO DIFF WBC: CPT | Performed by: INTERNAL MEDICINE

## 2022-09-01 PROCEDURE — 86300 IMMUNOASSAY TUMOR CA 15-3: CPT | Performed by: INTERNAL MEDICINE

## 2022-09-01 PROCEDURE — 80053 COMPREHEN METABOLIC PANEL: CPT | Performed by: INTERNAL MEDICINE

## 2022-09-01 PROCEDURE — 99214 OFFICE O/P EST MOD 30 MIN: CPT | Mod: 25 | Performed by: NURSE PRACTITIONER

## 2022-09-01 RX ORDER — HEPARIN SODIUM,PORCINE 10 UNIT/ML
5 VIAL (ML) INTRAVENOUS
Status: CANCELLED | OUTPATIENT
Start: 2022-09-01

## 2022-09-01 RX ORDER — METHYLPREDNISOLONE SODIUM SUCCINATE 125 MG/2ML
125 INJECTION, POWDER, LYOPHILIZED, FOR SOLUTION INTRAMUSCULAR; INTRAVENOUS
Status: CANCELLED
Start: 2022-09-01

## 2022-09-01 RX ORDER — HEPARIN SODIUM (PORCINE) LOCK FLUSH IV SOLN 100 UNIT/ML 100 UNIT/ML
5 SOLUTION INTRAVENOUS
Status: CANCELLED | OUTPATIENT
Start: 2022-09-01

## 2022-09-01 RX ORDER — NALOXONE HYDROCHLORIDE 0.4 MG/ML
0.2 INJECTION, SOLUTION INTRAMUSCULAR; INTRAVENOUS; SUBCUTANEOUS
Status: CANCELLED | OUTPATIENT
Start: 2022-09-01

## 2022-09-01 RX ORDER — MEPERIDINE HYDROCHLORIDE 25 MG/ML
25 INJECTION INTRAMUSCULAR; INTRAVENOUS; SUBCUTANEOUS EVERY 30 MIN PRN
Status: CANCELLED | OUTPATIENT
Start: 2022-09-01

## 2022-09-01 RX ORDER — ALBUTEROL SULFATE 90 UG/1
1-2 AEROSOL, METERED RESPIRATORY (INHALATION)
Status: CANCELLED
Start: 2022-09-01

## 2022-09-01 RX ORDER — ALBUTEROL SULFATE 0.83 MG/ML
2.5 SOLUTION RESPIRATORY (INHALATION)
Status: CANCELLED | OUTPATIENT
Start: 2022-09-01

## 2022-09-01 RX ORDER — HEPARIN SODIUM (PORCINE) LOCK FLUSH IV SOLN 100 UNIT/ML 100 UNIT/ML
5 SOLUTION INTRAVENOUS
Status: DISCONTINUED | OUTPATIENT
Start: 2022-09-01 | End: 2022-09-01 | Stop reason: HOSPADM

## 2022-09-01 RX ORDER — EPINEPHRINE 1 MG/ML
0.3 INJECTION, SOLUTION INTRAMUSCULAR; SUBCUTANEOUS EVERY 5 MIN PRN
Status: CANCELLED | OUTPATIENT
Start: 2022-09-01

## 2022-09-01 RX ORDER — HEPARIN SODIUM (PORCINE) LOCK FLUSH IV SOLN 100 UNIT/ML 100 UNIT/ML
500 SOLUTION INTRAVENOUS
Status: DISCONTINUED | OUTPATIENT
Start: 2022-09-01 | End: 2022-09-01 | Stop reason: HOSPADM

## 2022-09-01 RX ORDER — DIPHENHYDRAMINE HYDROCHLORIDE 50 MG/ML
50 INJECTION INTRAMUSCULAR; INTRAVENOUS
Status: CANCELLED
Start: 2022-09-01

## 2022-09-01 RX ADMIN — HEPARIN SODIUM (PORCINE) LOCK FLUSH IV SOLN 100 UNIT/ML 500 UNITS: 100 SOLUTION at 07:37

## 2022-09-01 RX ADMIN — HEPARIN SODIUM (PORCINE) LOCK FLUSH IV SOLN 100 UNIT/ML 5 ML: 100 SOLUTION at 10:29

## 2022-09-01 ASSESSMENT — PAIN SCALES - GENERAL: PAINLEVEL: MILD PAIN (3)

## 2022-09-01 NOTE — PROGRESS NOTES
"SPIRITUAL HEALTH SERVICES Progress Note  [facility] - Infusion    Request - Visit for emotional support; Ptnt has been followed by .      Illness Narrative - Shaneka shared a lengthy and wide-ranging personal narrative incorporating elements of her life story, including growing up [place] and how she met her , career as a teacher, family dynamics, goals, and some of her health story. She expressed \"being at peace\" with her illness and determination to \"enjoy whatever time she has.\"      Distress - Shaenka shared that she has times of feeling very tired or down, but they don't last. She said she doesn't have a lot of worry at this point in her life, having \"come to a place of acceptance.\" She spoke of purposeful, intentional enjoyment of things in her life, that there's \"no point in being down.\"      Coping - She enjoys riding her bicycle, and she is looking forward to two upcoming vacations, one with her  to [place], the other with a group of girlfriends, one of whom she met here at the [facility], to the [facility]. She also named the desire to return to the [place] in [place], which she still owns but hasn't seen in several years, to reconnect with people and the land. 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 She also said she reads a lot to support her spirit, lately focusing on stories of \"near-death experiences\" and how they reassure her; she expects to see her parents and others again; she was very close to her father. Shaneka said she \"is not afraid of death.\"      Meaning-Making - We explored family dynamics, ways people learn, interpersonal boundaries, spiritual connection to a sense of place (Shaneka has a small jar of soil from the ranch). I offered reflective conversation, normalized emotions, affirmed experiences, and said a blessing.      Plan -  remains available.    Rev Josie Spears  [facility]ate [facility] " [facility] Phone Line [phone]  [place] (Tuesdays & Thursdays) [phone]

## 2022-09-01 NOTE — NURSING NOTE
"Oncology Rooming Note    September 1, 2022 8:08 AM   Shaneka Alvarez is a 62 year old female who presents for:    Chief Complaint   Patient presents with     Oncology Clinic Visit     Prior to treatment       Initial Vitals: /74 (BP Location: Left arm)   Pulse 84   Temp 98.8  F (37.1  C) (Oral)   Resp 16   Ht 1.575 m (5' 2.01\")   Wt 59.3 kg (130 lb 11.2 oz)   SpO2 100%   BMI 23.90 kg/m   Estimated body mass index is 23.9 kg/m  as calculated from the following:    Height as of this encounter: 1.575 m (5' 2.01\").    Weight as of this encounter: 59.3 kg (130 lb 11.2 oz). Body surface area is 1.61 meters squared.  Mild Pain (3) Comment: Data Unavailable   No LMP recorded. Patient is postmenopausal.  Allergies reviewed: Yes  Medications reviewed: Yes    Medications: Medication refills not needed today.  Pharmacy name entered into [facility]:    [facility] - [place], [place] - [address] [place] [address] PHARM[address] [place] [id number] [facility]    Clinical concerns: Back pain has been more intense. Feels that newest pain RX has not caught up to pain yet.        Yuni Botello LPN              "

## 2022-09-01 NOTE — LETTER
9/1/2022         RE: Shaneka Alvarez  [address] [facility] [address]        Dear Colleague,    Thank you for referring your patient, Shaneka Alvarez, to the [facility]. Please see a copy of my visit note below.    Oncology Follow Up Visit: September 1, 2022     Oncologist: Dr Dewayne Zepeda  PCP: Mohit Barcenas    Diagnosis: Metastatic Breast Cancer to brain and spine  Shaneka Alvarez is a 61 yo female who was diagnosed with Stage IIB, T2 N1 M0 invasive lobular carcinoma of the right breast  In January 2006.  Final pathology showed a 4.5 x 3.8 x 2.5 cm, 01/14 lymph nodes positive.  Estrogen, progesterone receptor positive, HER-2 negative.     Genetics- BRCA1 and 2 mutations not detected. 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  There was no evidence of new multiple sclerosis lesions.  PET scan on 8/30/2019 which showed widespread bone metastatic lesions (calvarium, spine, sacrum, pelvis, ribs most prominent at C7, T3, L1 and L4), hypermetabolic 3 cm lesion in LLL, and mediastinal/hilar LN. CEA wnl at 1.9 and C27-29 elevated to 415 (28 on 8/23/16). A CT guided biopsy of the right iliac bone was obtained on 9/4/19, pathology consistent with metastatic adenocarcinoma (breast), ER/NC negative, HER-2 negative PDL 1 < 1%. A second biopsy was take of the LLL nodule via EBUS on 9/5/19 did not show any malignancy.  C/T/L spine MRI 8/3/19 to 9/2/19 with numerous enhancing lesions of the C, T and L spine, largest around T9 and L1. No evidence of cord compression. Has a L1 compression fracture and impending L4. completed radiation therapy to T12-L5.-Neurosurgery recommended no surgical intervention but to wear [place] brace when out of bed and HOB >30 degrees  Treatment:   2006:  ECOG 2104 protocol of dose-dense Adriamycin,  Cytoxan and Avastin x4 cycles followed by Taxol and Avastin x4 cycles.   03/2007:  Completed 1 year Avastin.   11/2006-01/2007:  Radiotherapy to the right breast of 5040 cGy.   03/[id number]:  Aromasin.  Stopped after moving to the [facility].   01/2016:  Right modified radical mastectomy with latissimus dorsi flap reconstruction and a left prophylactic mastectomy with latissimus dorsi flap reconstruction.   9/6/2019 till 9/18/2019 Received radiation T12-L5 3000 cGy in 10 fractions   -Neurosurgery recommended no surgical intervention but to wear [place] brace when out of bed and HOB >30 degrees  9/18/2019 completed T12-L5 radiation in 10 fractions =3000 cGy  10/1/2019 Xeloda x 2 trials lowering dose to 1000 mg bid  10/23/2019 Began every 3 month Zometa  11/5/2019- 1/14/2020  weekly paclitaxel x 3 cycles  1/28-10/2020 Eribulin  11/11/2020- 12/2021 Trodelvy  Repeat biopsy done from the right acetabulum on 2/25/2021 which showed metastatic lobular breast cancer.  Hormonal studies, HER-2 FISH again showed that it is triple negative.  Androgen receptor is diffusely positive.    PD-L1 negative.  Foundation 1 testing did not reveal any actionable mutations  1-3/2022  bicalutamide with bony progression  4/202 began liposomal doxorubicin     Interval History: Ms. Alvarez is contacted today prior to cycle 6 of Doxil. Pt reporting she has been tolerating treatment well without new symptoms but later shares she has had some insomnia that is random- using Nyguil if needed and is working well. She is eating well with weight at normal. She continues with intermittent migraines but is happy with current medications and is handling them well. Nausea well controlled with Emend. Seeing palliative for pain control to back and working with Buccal Butrans with good results at this time.  Feels she is eating well and uses supplements to help with intake.   Rest of comprehensive and complete ROS is reviewed and is negative.   Past Medical  History:   Diagnosis Date     Breast cancer (H)     Age 44     Cataract     left eye     Chemotherapy induced nausea and vomiting 12/15/2020     Common migraine      Esophageal reflux      H/O bilateral mastectomy      Mild major depression (H)      Multiple sclerosis (H)      Pulmonary embolism (H)      Current Outpatient Medications   Medication     acetaminophen (TYLENOL) 500 MG tablet     Buprenorphine HCl (BELBUCA) 75 MCG FILM buccal film     busPIRone (BUSPAR) 15 MG tablet     calcium citrate-vitamin D (CITRACAL) 315-250 MG-UNIT TABS     Cholecalciferol (VITAMIN D3 PO)     ELIQUIS ANTICOAGULANT 5 MG tablet     erenumab-aooe (AIMOVIG) 70 MG/ML injection     ibuprofen (ADVIL/MOTRIN) 600 MG tablet     Lidocaine (LIDOCARE) 4 % Patch     lidocaine, viscous, (XYLOCAINE) 2 % solution     loratadine (CLARITIN) 10 MG tablet     LORazepam (ATIVAN) 1 MG tablet     magnesium 250 MG tablet     morphine (MSIR) 15 MG IR tablet     naratriptan (AMERGE) 2.5 MG tablet     Nerve Stimulator (NERIVIO) CRISTOPHER     OLANZapine (ZYPREXA) 2.5 MG tablet     ondansetron (ZOFRAN-ODT) 8 MG ODT tab     prochlorperazine (COMPAZINE) 10 MG tablet     propranolol ER (INDERAL LA) 80 MG 24 hr capsule     senna-docusate (SENOKOT-S/PERICOLACE) 8.6-50 MG tablet     traZODone (DESYREL) 50 MG tablet     venlafaxine (EFFEXOR-XR) 75 MG 24 hr capsule     vitamin B-2 (RIBOFLAVIN) 25 MG TABS tablet     gabapentin (NEURONTIN) 100 MG capsule     gabapentin (NEURONTIN) 300 MG capsule     predniSONE (DELTASONE) 20 MG tablet     triamcinolone (KENALOG) 0.1 % external cream     ubrogepant (UBRELVY) 50 MG tablet     valACYclovir (VALTREX) 1000 mg tablet     No current facility-administered medications for this visit.     Facility-Administered Medications Ordered in Other Visits   Medication     denosumab (XGEVA) injection 120 mg     DOXOrubicin liposome (DOXIL) 70 mg in D5W 310 mL infusion     heparin 100 UNIT/ML injection 5 mL     Allergies   Allergen Reactions      "Bactrim [Sulfamethoxazole W/Trimethoprim]      Internal bleeding     Copaxone [Glatiramer] Hives       Physical Exam:/74 (BP Location: Left arm)   Pulse 84   Temp 98.8  F (37.1  C) (Oral)   Resp 16   Ht 1.575 m (5' 2.01\")   Wt 59.3 kg (130 lb 11.2 oz)   SpO2 100%   BMI 23.90 kg/m     Constitutional: Alert, healthy, and in no distress.   ENT: Eyes bright, No mouth sores  Neck: Supple, No adenopathy.Thyroid symmetric  Cardiac: Heart rate and rhythm is regular and strong without murmur  Respiratory: Breathing easy. Lung sounds clear to auscultation  Abdomen: Soft, non-tender, BS normal. No masses or organomegaly  MS: Muscle tone normal, extremities normal with no edema. Walking  Skin: No suspicious lesions or rashes  Neuro: Sensory grossly WNL, gait normal.   Lymph: Normal ant/post cervical, axillary, supraclavicular nodes  Psych: Mentation appears normal and affect normal/bright and smiling    Laboratory Results:   Results for orders placed or performed in visit on 09/01/22   Comprehensive metabolic panel     Status: Abnormal   Result Value Ref Range    Sodium 137 133 - 144 mmol/L    Potassium 3.5 3.4 - 5.3 mmol/L    Chloride 100 94 - 109 mmol/L    Carbon Dioxide (CO2) 31 20 - 32 mmol/L    Anion Gap 6 3 - 14 mmol/L    Urea Nitrogen 9 7 - 30 mg/dL    Creatinine 0.71 0.52 - 1.04 mg/dL    Calcium 9.4 8.5 - 10.1 mg/dL    Glucose 113 (H) 70 - 99 mg/dL    Alkaline Phosphatase 79 40 - 150 U/L    AST 29 0 - 45 U/L    ALT 25 0 - 50 U/L    Protein Total 6.7 (L) 6.8 - 8.8 g/dL    Albumin 3.3 (L) 3.4 - 5.0 g/dL    Bilirubin Total 0.4 0.2 - 1.3 mg/dL    GFR Estimate >90 >60 mL/min/1.73m2   CBC with platelets and differential     Status: Abnormal   Result Value Ref Range    WBC Count 3.1 (L) 4.0 - 11.0 10e3/uL    RBC Count 4.00 3.80 - 5.20 10e6/uL    Hemoglobin 12.1 11.7 - 15.7 g/dL    Hematocrit 36.8 35.0 - 47.0 %    MCV 92 78 - 100 fL    MCH 30.3 26.5 - 33.0 pg    MCHC 32.9 31.5 - 36.5 g/dL    RDW 14.6 10.0 - 15.0 % "    Platelet Count 183 150 - 450 10e3/uL    % Neutrophils 54 %    % Lymphocytes 12 %    % Monocytes 26 %    % Eosinophils 5 %    % Basophils 1 %    % Immature Granulocytes 2 %    NRBCs per 100 WBC 0 <1 /100    Absolute Neutrophils 1.7 1.6 - 8.3 10e3/uL    Absolute Lymphocytes 0.4 (L) 0.8 - 5.3 10e3/uL    Absolute Monocytes 0.8 0.0 - 1.3 10e3/uL    Absolute Eosinophils 0.2 0.0 - 0.7 10e3/uL    Absolute Basophils 0.0 0.0 - 0.2 10e3/uL    Absolute Immature Granulocytes 0.1 <=0.4 10e3/uL    Absolute NRBCs 0.0 10e3/uL   RBC and Platelet Morphology     Status: None   Result Value Ref Range    Platelet Assessment  Automated Count Confirmed. Platelet morphology is normal.     Automated Count Confirmed. Platelet morphology is normal.    RBC Morphology Confirmed RBC Indices    CBC with platelets differential     Status: Abnormal    Narrative    The following orders were created for panel order CBC with platelets differential.  Procedure                               Abnormality         Status                     ---------                               -----------         ------                     CBC with platelets and d...[[id number]]  Abnormal            Final result               RBC and Platelet Morphology[[id number]]                      Final result                 Please view results for these tests on the individual orders.     Assessment and Plan:   Metastatic Breast Cancer to brain and spine-Pt continues with Doxil every 28 days and feels she is tolerating well. Meeting goals and will start cycle 6 today.  She will be meeting with SHAYNE prior to cycle 7.  Last imaging was 7/1/2022- will be due in October.   Back pain/bone metastasis-  Pt previously completed radiation therapy to T12-L5. Seeing palliative care for help with pain- using Buprenorphine every 12 hours with lidocaine patches and MSIR 15 mg tabs every 3 hours as needed. Feeling well controlled at this time.   Bone mets-. She continues using Xgeva monthly.  She  confirmed use of calcium plus vitamin D for support.    Bilateral pulmonary embolism-  Found 7/2020 with imaging. Continues on Eliquis bid-no new symptoms of concern.   Migraine- pt now using propranolol daily and Aimovig plus Ubrelvy or Amerge for onset of headaches and feels this is working well.   Anxiety/Depression-continues using BuSpar and Effexor for help with the anxiety and has trazodone for bedtime.   MS - currently not on treatment for her MS - no new or recurrent symptoms- has not seen neurology for > 1 year.   The total time of this encounter amounted to 30 minutes. This time included face to face time spent with the patient, prep work, ordering tests, and performing post visit documentation.  Annie Bailon Cnp            Again, thank you for allowing me to participate in the care of your patient.        Sincerely,        Annie Bailon, TAY, APRN CNP

## 2022-09-01 NOTE — PROGRESS NOTES
Infusion Nursing Note:  Shaneka Alvarez presents today for Doxil and Xgeva.    Patient seen by provider today: Yes: Annie Bailon NP    present during visit today: Not Applicable.    Note: The pt reports feeling well today and denies and concerns at this time. She reports tolerating her infusions and Xgeva well.    Intravenous Access:  Implanted Port.    Treatment Conditions:  Lab Results   Component Value Date    HGB 12.1 09/01/2022    WBC 3.1 (L) 09/01/2022    ANEU 1.9 10/26/2021    ANEUTAUTO 1.7 09/01/2022     09/01/2022      Lab Results   Component Value Date     09/01/2022    POTASSIUM 3.5 09/01/2022    MAG 2.0 04/21/2022    CR 0.71 09/01/2022    RAFAELA 9.4 09/01/2022    BILITOTAL 0.4 09/01/2022    ALBUMIN 3.3 (L) 09/01/2022    ALT 25 09/01/2022    AST 29 09/01/2022     Results reviewed, labs MET treatment parameters, ok to proceed with treatment.    Post Infusion Assessment:  Patient tolerated infusion without incident.  Patient tolerated injection without incident.  Blood return noted pre and post infusion.  Site patent and intact, free from redness, edema or discomfort.  No evidence of extravasations.  Access discontinued per protocol.     Discharge Plan:   AVS to patient via MYCHART.  Patient will return 9/30/22 for next appointment.   Patient discharged in stable condition accompanied by: self.  Departure Mode: Ambulatory.      Elicia Lindo RN

## 2022-09-06 ENCOUNTER — MYC MEDICAL ADVICE (OUTPATIENT)
Dept: ONCOLOGY | Facility: CLINIC | Age: 62
End: 2022-09-06

## 2022-09-06 DIAGNOSIS — G89.3 CANCER ASSOCIATED PAIN: ICD-10-CM

## 2022-09-06 DIAGNOSIS — C50.911 BILATERAL MALIGNANT NEOPLASM OF BREAST IN FEMALE, UNSPECIFIED ESTROGEN RECEPTOR STATUS, UNSPECIFIED SITE OF BREAST (H): ICD-10-CM

## 2022-09-06 DIAGNOSIS — C50.919 METASTATIC BREAST CANCER: ICD-10-CM

## 2022-09-06 DIAGNOSIS — C50.912 BILATERAL MALIGNANT NEOPLASM OF BREAST IN FEMALE, UNSPECIFIED ESTROGEN RECEPTOR STATUS, UNSPECIFIED SITE OF BREAST (H): ICD-10-CM

## 2022-09-06 DIAGNOSIS — C79.51 BONE METASTASES: Primary | ICD-10-CM

## 2022-09-06 RX ORDER — MORPHINE SULFATE 15 MG/1
15-30 TABLET ORAL
Qty: 120 TABLET | Refills: 0 | Status: SHIPPED | OUTPATIENT
Start: 2022-09-06 | End: 2022-09-07

## 2022-09-07 ENCOUNTER — VIRTUAL VISIT (OUTPATIENT)
Dept: ONCOLOGY | Facility: CLINIC | Age: 62
End: 2022-09-07
Payer: MEDICARE

## 2022-09-07 DIAGNOSIS — C79.51 BONE METASTASIS: ICD-10-CM
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
## 2022-09-07 DIAGNOSIS — G89.3 CANCER ASSOCIATED PAIN: ICD-10-CM

## 2022-09-07 DIAGNOSIS — C50.919 METASTATIC BREAST CANCER: ICD-10-CM

## 2022-09-07 DIAGNOSIS — M54.6 PAIN IN THORACIC SPINE: ICD-10-CM

## 2022-09-07 PROCEDURE — 99214 OFFICE O/P EST MOD 30 MIN: CPT | Mod: 95 | Performed by: NURSE PRACTITIONER

## 2022-09-07 RX ORDER — MORPHINE SULFATE 15 MG/1
15-30 TABLET ORAL
Qty: 120 TABLET | Refills: 0 | Status: SHIPPED | OUTPATIENT
Start: 2022-09-07 | End: 2023-01-01

## 2022-09-07 NOTE — LETTER
9/7/2022         RE: Shaneka Alvarez  [address] [facility] [address]        Dear Colleague,    Thank you for referring your patient, Shaneka Alvarez, to the [facility]. Please see a copy of my visit note below.    Shaneka is a 62 year old who is being evaluated via a billable video visit.      How would you like to obtain your AVS? MyChart  If the video visit is dropped, the invitation should be resent by: Text to cell phone: [phone]  Will anyone else be joining your video visit? Claudia JOE          Video-Visit Details    Video Start Time: 3:12 PM    Type of service:  Video Visit    Video End Time:3:24pm    Originating Location (pt. Location): Home    Distant Location (provider location):  [facility]     Platform used for Video Visit: [facility]    Oncology Follow Up Visit: September 1, 2022     Oncologist: Dr Dewayne Zepeda  PCP: Mohit Barcenas    Diagnosis: Metastatic Breast Cancer to brain and spine  Called today for fall and back pain  Shaneka Alvarez is a 63 yo female who was diagnosed with Stage IIB, T2 N1 M0 invasive lobular carcinoma of the right breast  In January 2006.  Final pathology showed a 4.5 x 3.8 x 2.5 cm, 01/14 lymph nodes positive.  Estrogen, progesterone receptor positive, HER-2 negative.     Genetics- BRCA1 and 2 mutations not detected. 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  There was no evidence of new multiple sclerosis lesions.  PET scan on 8/30/2019 which showed widespread bone metastatic lesions (calvarium, spine, sacrum, pelvis, ribs most prominent at C7, T3, L1 and L4), hypermetabolic 3 cm lesion in LLL, and mediastinal/hilar LN. CEA wnl at 1.9 and C27-29 elevated to 415 (28 on 8/23/16). A CT guided biopsy of the right iliac bone was  obtained on 9/4/19, pathology consistent with metastatic adenocarcinoma (breast), ER/WA negative, HER-2 negative PDL 1 < 1%. A second biopsy was take of the LLL nodule via EBUS on 9/5/19 did not show any malignancy.  C/T/L spine MRI 8/3/19 to 9/2/19 with numerous enhancing lesions of the C, T and L spine, largest around T9 and L1. No evidence of cord compression. Has a L1 compression fracture and impending L4. completed radiation therapy to T12-L5.-Neurosurgery recommended no surgical intervention but to wear [place] brace when out of bed and HOB >30 degrees  Treatment:   2006:  ECOG 2104 protocol of dose-dense Adriamycin, Cytoxan and Avastin x4 cycles followed by Taxol and Avastin x4 cycles.   03/2007:  Completed 1 year Avastin.   11/2006-01/2007:  Radiotherapy to the right breast of 5040 cGy.   03/[id number]:  Aromasin.  Stopped after moving to the [facility].   01/2016:  Right modified radical mastectomy with latissimus dorsi flap reconstruction and a left prophylactic mastectomy with latissimus dorsi flap reconstruction.   9/6/2019 till 9/18/2019 Received radiation T12-L5 3000 cGy in 10 fractions   -Neurosurgery recommended no surgical intervention but to wear [place] brace when out of bed and HOB >30 degrees  9/18/2019 completed T12-L5 radiation in 10 fractions =3000 cGy  10/1/2019 Xeloda x 2 trials lowering dose to 1000 mg bid  10/23/2019 Began every 3 month Zometa  11/5/2019- 1/14/2020  weekly paclitaxel x 3 cycles  1/28-10/2020 Eribulin  11/11/2020- 12/2021 Trodelvy  Repeat biopsy done from the right acetabulum on 2/25/2021 which showed metastatic lobular breast cancer.  Hormonal studies, HER-2 FISH again showed that it is triple negative.  Androgen receptor is diffusely positive.    PD-L1 negative.  Foundation 1 testing did not reveal any actionable mutations  1-3/2022  bicalutamide with bony progression  4/202 began liposomal doxorubicin     Interval History: Ms. Alvarez contacted us yesterday about a fall  with new thoracic back pain on a patient with known metastatic breast cancer to the spine.  Patient shares that her dog wrapped her leash around her ankles and took off making her fall to the right side of her body on the sidewalk.  She is noted to have an increase in pain to the thoracic area between the shoulder blades since the fall with some hip bruising is well.  She has tried heat which has not been helpful and has also been using her pain medications including a buckle pain med and was given morphine by palliative care earlier however the pharmacy is out of morphine and needs to be sent to a new pharmacy for filling.  Rest of comprehensive and complete ROS is reviewed and is negative.   Past Medical History:   Diagnosis Date     Breast cancer (H)     Age 44     Cataract     left eye     Chemotherapy induced nausea and vomiting 12/15/2020     Common migraine      Esophageal reflux      H/O bilateral mastectomy      Mild major depression (H)      Multiple sclerosis (H)      Pulmonary embolism (H)      Current Outpatient Medications   Medication     morphine (MSIR) 15 MG IR tablet     acetaminophen (TYLENOL) 500 MG tablet     Buprenorphine HCl (BELBUCA) 150 MCG FILM buccal film     busPIRone (BUSPAR) 15 MG tablet     calcium citrate-vitamin D (CITRACAL) 315-250 MG-UNIT TABS     Cholecalciferol (VITAMIN D3 PO)     ELIQUIS ANTICOAGULANT 5 MG tablet     erenumab-aooe (AIMOVIG) 70 MG/ML injection     ibuprofen (ADVIL/MOTRIN) 600 MG tablet     Lidocaine (LIDOCARE) 4 % Patch     lidocaine, viscous, (XYLOCAINE) 2 % solution     loratadine (CLARITIN) 10 MG tablet     LORazepam (ATIVAN) 1 MG tablet     magnesium 250 MG tablet     naloxone (NARCAN) 4 MG/0.1ML nasal spray     naratriptan (AMERGE) 2.5 MG tablet     Nerve Stimulator (NERIVIO) CRISTOPHER     OLANZapine (ZYPREXA) 2.5 MG tablet     ondansetron (ZOFRAN-ODT) 8 MG ODT tab     predniSONE (DELTASONE) 20 MG tablet     prochlorperazine (COMPAZINE) 10 MG tablet      propranolol ER (INDERAL LA) 80 MG 24 hr capsule     senna-docusate (SENOKOT-S/PERICOLACE) 8.6-50 MG tablet     traZODone (DESYREL) 50 MG tablet     triamcinolone (KENALOG) 0.1 % external cream     ubrogepant (UBRELVY) 50 MG tablet     valACYclovir (VALTREX) 1000 mg tablet     venlafaxine (EFFEXOR-XR) 75 MG 24 hr capsule     vitamin B-2 (RIBOFLAVIN) 25 MG TABS tablet     No current facility-administered medications for this visit.     Allergies   Allergen Reactions     Bactrim [Sulfamethoxazole W/Trimethoprim]      Internal bleeding     Copaxone [Glatiramer] Hives       Physical Exam:  GENERAL: Healthy, alert and no distress  EYES: Eyes grossly normal to inspection.  No discharge or erythema, or obvious scleral/conjunctival abnormalities.  RESP: No audible wheeze, cough, or visible cyanosis.  No visible retractions or increased work of breathing.    SKIN: Visible skin clear. No significant rash, abnormal pigmentation or lesions.  NEURO: Cranial nerves grossly intact.  Mentation and speech appropriate for age.  MS: can slowly get out of bed and can lift giovanny shirt with pain to mid thoracic- between scapulas.   PSYCH: Mentation appears normal, affect normal/bright, judgement and insight intact, normal speech and appearance well-groomed.  The rest of a comprehensive physical examination is deferred due to video visit restrictions     Laboratory Results:   No results found for any visits on 09/07/22.     Assessment and Plan:   Fall with pain to thoracic spine- Had fall walking dog and fell onto sidewalk bruising right hip and found new acute pain to thoracic back area that is not responding to heat or rest. Since pt has metastatic breast cancer with spinal involvement it is concerning though her metastasis has included the low thoracic and lumbar region where the pain now is in the mid thoracic region.   Discussed with patient that we can get an x-ray of the area to find out if there is been any fractures or obvious  change to the thoracic area however would need an MRI to find out anything more.  She would like to complete the x-ray of the area to find out if there is been any fractures or displacement.  For the pain she is on a significant amount of pain medication including a buccal pain medication as well as morphine that is been added however she is on been unable to  the morphine from the pharmacy which is out of this product and so we will renew it and sent to a new pharmacy for her.  Suggested that if no abnormalities noted on the x-ray would suggest she trial heat versus cold, may use gentle stretching and would refer her to physical therapy for help with the muscular strain.  Patient is on Xgeva monthly for bone mets  Patient is also scheduled to have a PET/CT for follow-up to her metastatic cancer in October-imaging changed from CT to PET/CT today.  Metastatic Breast Cancer to brain and spine-Pt continues with Doxil every 28 days and will be due for cycle 7 on 9/29/2022.  Patient is also scheduled to have a PET/CT for follow-up to her metastatic cancer in October-imaging changed from CT to PET/CT today.  The total time of this encounter amounted to 30 minutes. This time included face to face time spent with the patient, prep work, ordering tests, and performing post visit documentation.  Annie Bailon Cnp            Again, thank you for allowing me to participate in the care of your patient.        Sincerely,        Annie Bialon NP, APRN CNP

## 2022-09-07 NOTE — PROGRESS NOTES
Shaneka is a 62 year old who is being evaluated via a billable video visit.      How would you like to obtain your AVS? MyChart  If the video visit is dropped, the invitation should be resent by: Text to cell phone: [phone]  Will anyone else be joining your video visit? Claudia JOE          Video-Visit Details    Video Start Time: 3:12 PM    Type of service:  Video Visit    Video End Time:3:24pm    Originating Location (pt. Location): Home    Distant Location (provider location):  [facility]     Platform used for Video Visit: [facility]

## 2022-09-07 NOTE — NURSING NOTE
Patient declined individual allergy and medication review by support staff because pt states nothing has changed since 9/1 review. PO

## 2022-09-07 NOTE — PROGRESS NOTES
Oncology Follow Up Visit: September 1, 2022     Oncologist: Dr Dewayne Zepeda  PCP: Mohit Barcenas    Diagnosis: Metastatic Breast Cancer to brain and spine  Called today for fall and back pain  Shaneka Alvarez is a 61 yo female who was diagnosed with Stage IIB, T2 N1 M0 invasive lobular carcinoma of the right breast  In January 2006.  Final pathology showed a 4.5 x 3.8 x 2.5 cm, 01/14 lymph nodes positive.  Estrogen, progesterone receptor positive, HER-2 negative.     Genetics- BRCA1 and 2 mutations not detected. 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  There was no evidence of new multiple sclerosis lesions.  PET scan on 8/30/2019 which showed widespread bone metastatic lesions (calvarium, spine, sacrum, pelvis, ribs most prominent at C7, T3, L1 and L4), hypermetabolic 3 cm lesion in LLL, and mediastinal/hilar LN. CEA wnl at 1.9 and C27-29 elevated to 415 (28 on 8/23/16). A CT guided biopsy of the right iliac bone was obtained on 9/4/19, pathology consistent with metastatic adenocarcinoma (breast), ER/ID negative, HER-2 negative PDL 1 < 1%. A second biopsy was take of the LLL nodule via EBUS on 9/5/19 did not show any malignancy.  C/T/L spine MRI 8/3/19 to 9/2/19 with numerous enhancing lesions of the C, T and L spine, largest around T9 and L1. No evidence of cord compression. Has a L1 compression fracture and impending L4. completed radiation therapy to T12-L5.-Neurosurgery recommended no surgical intervention but to wear Gorge brace when out of bed and HOB >30 degrees  Treatment:   2006:  ECOG 2104 protocol of dose-dense Adriamycin, Cytoxan and Avastin x4 cycles followed by Taxol and Avastin x4 cycles.   03/2007:  Completed 1 year Avastin.   11/2006-01/2007:  Radiotherapy to the right breast of 5040 cGy.   [id number]:  Aromasin.  Stopped after moving  to the [facility].   01/2016:  Right modified radical mastectomy with latissimus dorsi flap reconstruction and a left prophylactic mastectomy with latissimus dorsi flap reconstruction.   9/6/2019 till 9/18/2019 Received radiation T12-L5 3000 cGy in 10 fractions   -Neurosurgery recommended no surgical intervention but to wear [place] brace when out of bed and HOB >30 degrees  9/18/2019 completed T12-L5 radiation in 10 fractions =3000 cGy  10/1/2019 Xeloda x 2 trials lowering dose to 1000 mg bid  10/23/2019 Began every 3 month Zometa  11/5/2019- 1/14/2020  weekly paclitaxel x 3 cycles  1/28-10/2020 Eribulin  11/11/2020- 12/2021 Trodelvy  Repeat biopsy done from the right acetabulum on 2/25/2021 which showed metastatic lobular breast cancer.  Hormonal studies, HER-2 FISH again showed that it is triple negative.  Androgen receptor is diffusely positive.    PD-L1 negative.  Foundation 1 testing did not reveal any actionable mutations  1-3/2022  bicalutamide with bony progression  4/202 began liposomal doxorubicin     Interval History: Ms. Alvarez contacted us yesterday about a fall with new thoracic back pain on a patient with known metastatic breast cancer to the spine.  Patient shares that her dog wrapped her leash around her ankles and took off making her fall to the right side of her body on the sidewalk.  She is noted to have an increase in pain to the thoracic area between the shoulder blades since the fall with some hip bruising is well.  She has tried heat which has not been helpful and has also been using her pain medications including a buckle pain med and was given morphine by palliative care earlier however the pharmacy is out of morphine and needs to be sent to a new pharmacy for filling.  Rest of comprehensive and complete ROS is reviewed and is negative.   Past Medical History:   Diagnosis Date     Breast cancer (H)     Age 44     Cataract     left eye     Chemotherapy induced nausea and vomiting  12/15/2020     Common migraine      Esophageal reflux      H/O bilateral mastectomy      Mild major depression (H)      Multiple sclerosis (H)      Pulmonary embolism (H)      Current Outpatient Medications   Medication     morphine (MSIR) 15 MG IR tablet     acetaminophen (TYLENOL) 500 MG tablet     Buprenorphine HCl (BELBUCA) 150 MCG FILM buccal film     busPIRone (BUSPAR) 15 MG tablet     calcium citrate-vitamin D (CITRACAL) 315-250 MG-UNIT TABS     Cholecalciferol (VITAMIN D3 PO)     ELIQUIS ANTICOAGULANT 5 MG tablet     erenumab-aooe (AIMOVIG) 70 MG/ML injection     ibuprofen (ADVIL/MOTRIN) 600 MG tablet     Lidocaine (LIDOCARE) 4 % Patch     lidocaine, viscous, (XYLOCAINE) 2 % solution     loratadine (CLARITIN) 10 MG tablet     LORazepam (ATIVAN) 1 MG tablet     magnesium 250 MG tablet     naloxone (NARCAN) 4 MG/0.1ML nasal spray     naratriptan (AMERGE) 2.5 MG tablet     Nerve Stimulator (NERIVIO) CRISTOPHER     OLANZapine (ZYPREXA) 2.5 MG tablet     ondansetron (ZOFRAN-ODT) 8 MG ODT tab     predniSONE (DELTASONE) 20 MG tablet     prochlorperazine (COMPAZINE) 10 MG tablet     propranolol ER (INDERAL LA) 80 MG 24 hr capsule     senna-docusate (SENOKOT-S/PERICOLACE) 8.6-50 MG tablet     traZODone (DESYREL) 50 MG tablet     triamcinolone (KENALOG) 0.1 % external cream     ubrogepant (UBRELVY) 50 MG tablet     valACYclovir (VALTREX) 1000 mg tablet     venlafaxine (EFFEXOR-XR) 75 MG 24 hr capsule     vitamin B-2 (RIBOFLAVIN) 25 MG TABS tablet     No current facility-administered medications for this visit.     Allergies   Allergen Reactions     Bactrim [Sulfamethoxazole W/Trimethoprim]      Internal bleeding     Copaxone [Glatiramer] Hives       Physical Exam:  GENERAL: Healthy, alert and no distress  EYES: Eyes grossly normal to inspection.  No discharge or erythema, or obvious scleral/conjunctival abnormalities.  RESP: No audible wheeze, cough, or visible cyanosis.  No visible retractions or increased work of  breathing.    SKIN: Visible skin clear. No significant rash, abnormal pigmentation or lesions.  NEURO: Cranial nerves grossly intact.  Mentation and speech appropriate for age.  MS: can slowly get out of bed and can lift giovanny shirt with pain to mid thoracic- between scapulas.   PSYCH: Mentation appears normal, affect normal/bright, judgement and insight intact, normal speech and appearance well-groomed.  The rest of a comprehensive physical examination is deferred due to video visit restrictions     Laboratory Results:   No results found for any visits on 09/07/22.     Assessment and Plan:   Fall with pain to thoracic spine- Had fall walking dog and fell onto sidewalk bruising right hip and found new acute pain to thoracic back area that is not responding to heat or rest. Since pt has metastatic breast cancer with spinal involvement it is concerning though her metastasis has included the low thoracic and lumbar region where the pain now is in the mid thoracic region.   Discussed with patient that we can get an x-ray of the area to find out if there is been any fractures or obvious change to the thoracic area however would need an MRI to find out anything more.  She would like to complete the x-ray of the area to find out if there is been any fractures or displacement.  For the pain she is on a significant amount of pain medication including a buccal pain medication as well as morphine that is been added however she is on been unable to  the morphine from the pharmacy which is out of this product and so we will renew it and sent to a new pharmacy for her.  Suggested that if no abnormalities noted on the x-ray would suggest she trial heat versus cold, may use gentle stretching and would refer her to physical therapy for help with the muscular strain.  Patient is on Xgeva monthly for bone mets  Patient is also scheduled to have a PET/CT for follow-up to her metastatic cancer in October-imaging changed from CT to  PET/CT today.  Metastatic Breast Cancer to brain and spine-Pt continues with Doxil every 28 days and will be due for cycle 7 on 9/29/2022.  Patient is also scheduled to have a PET/CT for follow-up to her metastatic cancer in October-imaging changed from CT to PET/CT today.  The total time of this encounter amounted to 30 minutes. This time included face to face time spent with the patient, prep work, ordering tests, and performing post visit documentation.  Annie Bailon,Cnp

## 2022-09-07 NOTE — CONFIDENTIAL NOTE
[place] message addressed via telephone. Patient agreed to appointment with Annie this afternoon 9/7 to discuss further.  Pearl Orta RN

## 2022-09-11 ENCOUNTER — HEALTH MAINTENANCE LETTER (OUTPATIENT)
Age: 62
End: 2022-09-11

## 2022-09-16 DIAGNOSIS — I26.99 ACUTE PULMONARY EMBOLISM WITHOUT ACUTE COR PULMONALE, UNSPECIFIED PULMONARY EMBOLISM TYPE (H): ICD-10-CM

## 2022-09-19 RX ORDER — APIXABAN 5 MG/1
TABLET, FILM COATED ORAL
Qty: 60 TABLET | Refills: 0 | Status: SHIPPED | OUTPATIENT
Start: 2022-09-19 | End: 2022-10-17

## 2022-09-22 ENCOUNTER — IMMUNIZATION (OUTPATIENT)
Dept: NURSING | Facility: CLINIC | Age: 62
End: 2022-09-22
Payer: MEDICARE

## 2022-09-22 PROCEDURE — 0124A COVID-19,PF,PFIZER BOOSTER BIVALENT: CPT

## 2022-09-22 PROCEDURE — G0008 ADMIN INFLUENZA VIRUS VAC: HCPCS

## 2022-09-22 PROCEDURE — 90682 RIV4 VACC RECOMBINANT DNA IM: CPT

## 2022-09-22 PROCEDURE — 91312 COVID-19,PF,PFIZER BOOSTER BIVALENT: CPT

## 2022-09-23 DIAGNOSIS — G43.009 MIGRAINE WITHOUT AURA AND WITHOUT STATUS MIGRAINOSUS, NOT INTRACTABLE: ICD-10-CM

## 2022-09-23 DIAGNOSIS — F41.9 ANXIETY: ICD-10-CM

## 2022-09-26 RX ORDER — BUSPIRONE HYDROCHLORIDE 15 MG/1
TABLET ORAL
Qty: 180 TABLET | Refills: 0 | Status: SHIPPED | OUTPATIENT
Start: 2022-09-26 | End: 2022-11-18

## 2022-09-26 RX ORDER — PROPRANOLOL HYDROCHLORIDE 80 MG/1
CAPSULE, EXTENDED RELEASE ORAL
Qty: 90 CAPSULE | Refills: 0 | Status: SHIPPED | OUTPATIENT
Start: 2022-09-26 | End: 2022-11-18

## 2022-09-26 NOTE — PROGRESS NOTES
Hematology/Oncology Visit    Oncologist: Dr. Zepeda  Diagnosis: metastatic (bone) triple negative breast cancer  Reason for visit: exam prior to cycle 7 liposomal doxorubicin    Cancer and Treatment Hx:  INITIAL DIAGNOSIS  Jan 2006: diagnosed with Stage IIB, T2 N1 M0 invasive lobular carcinoma of the right breast. Final pathology showed 4.5 x 3.8 x 2.5 cm, 1/14 lymph nodes positive. ER/LA+ and HER-2 negative. Genetics with VUSin MSH2 gene  2006: ECOG 2104 protocol of dose-dense AC + Avastin x4 cycles followed by Taxol and Avastin x4 cycles.   Jan 2007: completed radiation to right breast  Mar [id number]: Aromasin, stopped after moving to the [facility]  Jan 2016: bilateral mastectomy with flap reconstruction     RELAPSE  Aug 2019: MRI brain for multiple sclerosis found multiple calvarial lesions suspicious for metastatic disease and no new MS lesions. PET scan with widespread bone metastatic lesions (calvarium, spine, sacrum, pelvis, ribs most prominent at C7, T3, L1 and L4), hypermetabolic 3 cm lesion in LLL, and mediastinal/hilar LN. CEA at 1.9 and C27-29 at 415 (28 on 8/23/16). Biopsy of the right iliac bone was consistent with metastatic adenocarcinoma (breast), ER/LA negative, HER-2 negative, PDL1 < 1%. A second biopsy of the LLL nodule via EBUS on 9/5/19 was negative for malignancy.  Sep 2019: MRI spine with numerous enhancing lesions of the spine, largest around T9 and L1, no cord compression, L1 compression fracture and impending L4. Neurosurgery recommended no surgical intervention but to wear [place] brace.  Sep 18, 2019: completed T12-L5 radiation  Oct 2019: Xeloda x 2 trials lowering dose to 1000 mg BID, every 3 month Zometa  Nov 2019- Jan 2020: weekly paclitaxel x 3 cycles  Jan- Oct 2020: Eribulin  Nov 2020- Dec 2021: Trodelvy, biopsy of R acetabulum in Feb with triple negative breast cancer, androgen receptor positive, PDL1 negative, no actionable mutations on Foundation 1 Jan- Mar 2022: bicalutamide  with bony progression  Apr 2020: liposomal doxorubicin     Interval History:  Shaneka has thankfully been feeling better since last oncology visit where she was having increased pain from a fall. The pain from that fall slowly improved over a weeks time with pain meds, heat, and ice application. No residual pain from that fall. Ongoing chronic low back pain. Cervical spine region is more painful recently, no specific injury. Current pain med regimen helpful. Migraine about 10 days per month which she reports is improved from 20. Current management regimen is helpful. Has noted intermittent neuropathy in fingertips, especially when hands are cool or cold. Feels ready for cycle 7 later today. Has generally been tolerating well. Antiemetics manage mild nausea well. Ongoing low appetite but adds supplementation with protein shakes regularly. Regular daily BMs. Discussed labs and recently downtrending tumor marker as well as upcoming PET. No questions or concerns today. Has a few exciting trips coming up, [place] with  and [place] with friends.    Medications, imaging, and labs:  Reviewed pertinent medications, no refills needed today.    Reviewed labs from today:   09/30/22   Creatinine 0.62   GFR Estimate >90   Calcium 8.5   Alkaline Phosphatase 77   ALT 25   AST 27   Bilirubin Total 0.3   WBC 3.3 (L) (P)   Hemoglobin 11.2 (L) (P)   Platelet Count 168 (P)   ANC 1.9     Reviewed read of PET from 7/1/22:  IMPRESSION: In this patient with history of breast cancer with osseous  metastases, there is complete response:  1. No suspicious osseous FDG uptake. Diffuse sclerotic and lytic  lesions of the skeleton, consistent with treated disease.  Resolution  of previous hypermetabolic bone lesions.  2. No suspicious FDG uptake to suggest metastatic disease.    Physical Exam:  GEN: pleasantly conversant female no acute distress  SKIN: generally intact, no visible rash, bruising, or sores   ENT: eyes non-icteric, no  "notable oral sores  LYMPH: no notable cervical or supraclavicular lymphadenopathy  RESP: clear to auscultation bilaterally, on room air  CARDS: regular rate and rhythm, no notable murmurs   GI: abd soft without notable hepatosplenomegaly, non-tender to palpation  MSK: no notable lower extremity edema, trapezius muscles at level of cervical spine tight  NEURO: alert and oriented without obvious focal deficit, gait stable  BP (!) 133/91 (BP Location: Left arm)   Pulse 65   Temp 98.4  F (36.9  C) (Oral)   Resp 16   Ht 1.575 m (5' 2.01\")   Wt 60.2 kg (132 lb 11.2 oz)   SpO2 100%   BMI 24.26 kg/m       Wt Readings from Last 4 Encounters:   09/30/22 60.2 kg (132 lb 11.2 oz)   09/01/22 59.3 kg (130 lb 11.2 oz)   08/05/22 59.9 kg (132 lb 1.6 oz)   08/05/22 59.9 kg (132 lb 1.6 oz)     Assessment and Plan:  Metastatic breast cancer  Mild normocytic anemia from chemotherapy  - PET 7/1 without any FDG avidity, tumor marker has been downtrending  - Continues on liposomal doxorubicin every 4 weeks   - Previously dose reduced 20% for tolerability  - Meeting goals and labs meeting parameters to proceed with cycle 7 later today  - Interval PET scan scheduled for 10/18 and Dr. Zepeda on 10/26  - Follow up prior to each cycle with Oncology provider     Metastasis to bone  - Monthly Xgeva and continues on calcium and vitamin D supplementation     Nausea secondary to chemotherapy  - Emend + dex premed, intermittently takes Zyprexa, zofran, and compazine     Migraine headaches  - Stable with use of Aimovig, has Amerge available for abortive therapy  - Followed by Migraine [facility]     Cancer related pain  Fall related pain, resolved  - Stable with buprenorphine buccal route and morphine for breakthrough pain  - Has noticed slight increase in cervical neck pain and ongoing chronic low back pain  - Mid back pain from fall earlier this month improved slowly over a week, now resolved  - Followed by Palliative Medicine        The total " time of this encounter amounted to 40 minutes today. This time includes face-to-face time spent with the patient, prep work, ordering tests, and performing post-visit documentation.  - Shannan Schilling, CNP

## 2022-09-30 ENCOUNTER — ONCOLOGY VISIT (OUTPATIENT)
Dept: ONCOLOGY | Facility: CLINIC | Age: 62
End: 2022-09-30
Payer: MEDICARE

## 2022-09-30 ENCOUNTER — LAB (OUTPATIENT)
Dept: ONCOLOGY | Facility: CLINIC | Age: 62
End: 2022-09-30
Payer: MEDICARE

## 2022-09-30 ENCOUNTER — INFUSION THERAPY VISIT (OUTPATIENT)
Dept: INFUSION THERAPY | Facility: CLINIC | Age: 62
End: 2022-09-30
Payer: MEDICARE

## 2022-09-30 VITALS
RESPIRATION RATE: 16 BRPM | WEIGHT: 132.7 LBS | BODY MASS INDEX: 24.42 KG/M2 | OXYGEN SATURATION: 100 % | HEIGHT: 62 IN | HEART RATE: 65 BPM | TEMPERATURE: 98.4 F | DIASTOLIC BLOOD PRESSURE: 91 MMHG | SYSTOLIC BLOOD PRESSURE: 133 MMHG

## 2022-09-30 DIAGNOSIS — T45.1X5A CHEMOTHERAPY INDUCED NAUSEA AND VOMITING: ICD-10-CM

## 2022-09-30 DIAGNOSIS — C50.911 BILATERAL MALIGNANT NEOPLASM OF BREAST IN FEMALE, UNSPECIFIED ESTROGEN RECEPTOR STATUS, UNSPECIFIED SITE OF BREAST (H): ICD-10-CM

## 2022-09-30 DIAGNOSIS — T45.1X5A CHEMOTHERAPY-INDUCED NEUTROPENIA (H): ICD-10-CM

## 2022-09-30 DIAGNOSIS — D70.1 CHEMOTHERAPY-INDUCED NEUTROPENIA (H): ICD-10-CM

## 2022-09-30 DIAGNOSIS — C79.51 BONE METASTASES: Primary | ICD-10-CM

## 2022-09-30 DIAGNOSIS — G89.3 CANCER ASSOCIATED PAIN: ICD-10-CM

## 2022-09-30 DIAGNOSIS — C50.912 BILATERAL MALIGNANT NEOPLASM OF BREAST IN FEMALE, UNSPECIFIED ESTROGEN RECEPTOR STATUS, UNSPECIFIED SITE OF BREAST (H): ICD-10-CM

## 2022-09-30 DIAGNOSIS — D64.81 ANEMIA ASSOCIATED WITH CHEMOTHERAPY: ICD-10-CM

## 2022-09-30 DIAGNOSIS — C50.919 METASTATIC BREAST CANCER: Primary | ICD-10-CM

## 2022-09-30 DIAGNOSIS — C79.51 BONE METASTASES: ICD-10-CM

## 2022-09-30 DIAGNOSIS — E87.6 HYPOKALEMIA: ICD-10-CM

## 2022-09-30 DIAGNOSIS — C50.919 METASTATIC BREAST CANCER: ICD-10-CM

## 2022-09-30 DIAGNOSIS — R11.2 CHEMOTHERAPY INDUCED NAUSEA AND VOMITING: ICD-10-CM

## 2022-09-30 DIAGNOSIS — T45.1X5A ANEMIA ASSOCIATED WITH CHEMOTHERAPY: ICD-10-CM

## 2022-09-30 LAB
ALBUMIN SERPL-MCNC: 3.1 G/DL (ref 3.4–5)
ALP SERPL-CCNC: 77 U/L (ref 40–150)
ALT SERPL W P-5'-P-CCNC: 25 U/L (ref 0–50)
ANION GAP SERPL CALCULATED.3IONS-SCNC: 7 MMOL/L (ref 3–14)
AST SERPL W P-5'-P-CCNC: 27 U/L (ref 0–45)
BASOPHILS # BLD AUTO: 0.1 10E3/UL (ref 0–0.2)
BASOPHILS NFR BLD AUTO: 2 %
BILIRUB SERPL-MCNC: 0.3 MG/DL (ref 0.2–1.3)
BUN SERPL-MCNC: 8 MG/DL (ref 7–30)
CALCIUM SERPL-MCNC: 8.5 MG/DL (ref 8.5–10.1)
CANCER AG15-3 SERPL-ACNC: 159 U/ML
CHLORIDE BLD-SCNC: 104 MMOL/L (ref 94–109)
CO2 SERPL-SCNC: 28 MMOL/L (ref 20–32)
CREAT SERPL-MCNC: 0.62 MG/DL (ref 0.52–1.04)
EOSINOPHIL # BLD AUTO: 0.1 10E3/UL (ref 0–0.7)
EOSINOPHIL NFR BLD AUTO: 2 %
ERYTHROCYTE [DISTWIDTH] IN BLOOD BY AUTOMATED COUNT: 14.6 % (ref 10–15)
GFR SERPL CREATININE-BSD FRML MDRD: >90 ML/MIN/1.73M2
GLUCOSE BLD-MCNC: 110 MG/DL (ref 70–99)
HCT VFR BLD AUTO: 33.9 % (ref 35–47)
HGB BLD-MCNC: 11.2 G/DL (ref 11.7–15.7)
IMM GRANULOCYTES # BLD: 0.1 10E3/UL
IMM GRANULOCYTES NFR BLD: 2 %
LYMPHOCYTES # BLD AUTO: 0.5 10E3/UL (ref 0.8–5.3)
LYMPHOCYTES NFR BLD AUTO: 16 %
MCH RBC QN AUTO: 29.4 PG (ref 26.5–33)
MCHC RBC AUTO-ENTMCNC: 33 G/DL (ref 31.5–36.5)
MCV RBC AUTO: 89 FL (ref 78–100)
MONOCYTES # BLD AUTO: 0.7 10E3/UL (ref 0–1.3)
MONOCYTES NFR BLD AUTO: 21 %
NEUTROPHILS # BLD AUTO: 1.9 10E3/UL (ref 1.6–8.3)
NEUTROPHILS NFR BLD AUTO: 57 %
NRBC # BLD AUTO: 0 10E3/UL
NRBC BLD AUTO-RTO: 0 /100
PLAT MORPH BLD: NORMAL
PLATELET # BLD AUTO: 168 10E3/UL (ref 150–450)
POTASSIUM BLD-SCNC: 3.8 MMOL/L (ref 3.4–5.3)
PROT SERPL-MCNC: 6.6 G/DL (ref 6.8–8.8)
RBC # BLD AUTO: 3.81 10E6/UL (ref 3.8–5.2)
RBC MORPH BLD: NORMAL
SODIUM SERPL-SCNC: 139 MMOL/L (ref 133–144)
WBC # BLD AUTO: 3.3 10E3/UL (ref 4–11)

## 2022-09-30 PROCEDURE — 99214 OFFICE O/P EST MOD 30 MIN: CPT | Mod: 25 | Performed by: NURSE PRACTITIONER

## 2022-09-30 PROCEDURE — 86300 IMMUNOASSAY TUMOR CA 15-3: CPT | Performed by: INTERNAL MEDICINE

## 2022-09-30 PROCEDURE — 85025 COMPLETE CBC W/AUTO DIFF WBC: CPT | Performed by: INTERNAL MEDICINE

## 2022-09-30 PROCEDURE — 96413 CHEMO IV INFUSION 1 HR: CPT | Performed by: NURSE PRACTITIONER

## 2022-09-30 PROCEDURE — 80053 COMPREHEN METABOLIC PANEL: CPT | Performed by: INTERNAL MEDICINE

## 2022-09-30 PROCEDURE — 99207 PR NO CHARGE LOS: CPT

## 2022-09-30 PROCEDURE — 96367 TX/PROPH/DG ADDL SEQ IV INF: CPT | Performed by: NURSE PRACTITIONER

## 2022-09-30 PROCEDURE — 96372 THER/PROPH/DIAG INJ SC/IM: CPT | Mod: 59 | Performed by: NURSE PRACTITIONER

## 2022-09-30 RX ORDER — NALOXONE HYDROCHLORIDE 0.4 MG/ML
0.2 INJECTION, SOLUTION INTRAMUSCULAR; INTRAVENOUS; SUBCUTANEOUS
Status: CANCELLED | OUTPATIENT
Start: 2022-09-30

## 2022-09-30 RX ORDER — HEPARIN SODIUM (PORCINE) LOCK FLUSH IV SOLN 100 UNIT/ML 100 UNIT/ML
500 SOLUTION INTRAVENOUS
Status: DISCONTINUED | OUTPATIENT
Start: 2022-09-30 | End: 2022-09-30 | Stop reason: HOSPADM

## 2022-09-30 RX ORDER — METHYLPREDNISOLONE SODIUM SUCCINATE 125 MG/2ML
125 INJECTION, POWDER, LYOPHILIZED, FOR SOLUTION INTRAMUSCULAR; INTRAVENOUS
Status: CANCELLED
Start: 2022-09-30

## 2022-09-30 RX ORDER — ALBUTEROL SULFATE 0.83 MG/ML
2.5 SOLUTION RESPIRATORY (INHALATION)
Status: CANCELLED | OUTPATIENT
Start: 2022-09-30

## 2022-09-30 RX ORDER — HEPARIN SODIUM,PORCINE 10 UNIT/ML
5 VIAL (ML) INTRAVENOUS
Status: CANCELLED | OUTPATIENT
Start: 2022-09-30

## 2022-09-30 RX ORDER — HEPARIN SODIUM (PORCINE) LOCK FLUSH IV SOLN 100 UNIT/ML 100 UNIT/ML
5 SOLUTION INTRAVENOUS
Status: DISCONTINUED | OUTPATIENT
Start: 2022-09-30 | End: 2022-09-30 | Stop reason: HOSPADM

## 2022-09-30 RX ORDER — HEPARIN SODIUM (PORCINE) LOCK FLUSH IV SOLN 100 UNIT/ML 100 UNIT/ML
5 SOLUTION INTRAVENOUS
Status: CANCELLED | OUTPATIENT
Start: 2022-09-30

## 2022-09-30 RX ORDER — ALBUTEROL SULFATE 90 UG/1
1-2 AEROSOL, METERED RESPIRATORY (INHALATION)
Status: CANCELLED
Start: 2022-09-30

## 2022-09-30 RX ORDER — DIPHENHYDRAMINE HYDROCHLORIDE 50 MG/ML
50 INJECTION INTRAMUSCULAR; INTRAVENOUS
Status: CANCELLED
Start: 2022-09-30

## 2022-09-30 RX ORDER — EPINEPHRINE 1 MG/ML
0.3 INJECTION, SOLUTION INTRAMUSCULAR; SUBCUTANEOUS EVERY 5 MIN PRN
Status: CANCELLED | OUTPATIENT
Start: 2022-09-30

## 2022-09-30 RX ORDER — MEPERIDINE HYDROCHLORIDE 25 MG/ML
25 INJECTION INTRAMUSCULAR; INTRAVENOUS; SUBCUTANEOUS EVERY 30 MIN PRN
Status: CANCELLED | OUTPATIENT
Start: 2022-09-30

## 2022-09-30 RX ADMIN — HEPARIN SODIUM (PORCINE) LOCK FLUSH IV SOLN 100 UNIT/ML 5 ML: 100 SOLUTION at 11:13

## 2022-09-30 RX ADMIN — HEPARIN SODIUM (PORCINE) LOCK FLUSH IV SOLN 100 UNIT/ML 500 UNITS: 100 SOLUTION at 08:07

## 2022-09-30 ASSESSMENT — PAIN SCALES - GENERAL: PAINLEVEL: MODERATE PAIN (5)

## 2022-09-30 NOTE — PROGRESS NOTES
Infusion Nursing Note:  Shaneka Alvarez presents today for C7D1 Doxil and Xgeva.    Patient seen by provider today: Yes: Shannan Schilling.   present during visit today: Not Applicable.    Note: Pt voices no new concerns today-has been tolerating infusion and injection well.    Intravenous Access:  Implanted Port.    Treatment Conditions:  Lab Results   Component Value Date    HGB 11.2 (L) 09/30/2022    WBC 3.3 (L) 09/30/2022    ANEU 1.9 10/26/2021    ANEUTAUTO 1.9 09/30/2022     09/30/2022      Lab Results   Component Value Date     09/30/2022    POTASSIUM 3.8 09/30/2022    MAG 2.0 04/21/2022    CR 0.62 09/30/2022    RAFAELA 8.5 09/30/2022    BILITOTAL 0.3 09/30/2022    ALBUMIN 3.1 (L) 09/30/2022    ALT 25 09/30/2022    AST 27 09/30/2022     Results reviewed, labs MET treatment parameters, ok to proceed with treatment.    Post Infusion Assessment:  Patient tolerated infusion without incident.  Patient tolerated injection into left arm without incident.  Blood return noted pre and post infusion.  Site patent and intact, free from redness, edema or discomfort.  No evidence of extravasations.  Access discontinued per protocol.     Discharge Plan:   Discharge instructions reviewed with: Patient.  Patient and/or family verbalized understanding of discharge instructions and all questions answered.  AVS to patient via [employer].  Patient will return 10/28 for next appointment. Future appts have been reviewed and crosschecked with appt note and plan.  Patient discharged in stable condition accompanied by: self.  Departure Mode: Ambulatory.      Laura Duncan RN

## 2022-09-30 NOTE — PROGRESS NOTES
Port site scrubbed with Chloraprep for 30 seconds. Accessed port using sterile technique. Two green and purple tubes drawn. See documentation flowsheet. Port needle left accessed for treatment. Tolerated port access and draw without complaint. Lauren Shaw RN on 9/30/2022 at 8:19 AM

## 2022-09-30 NOTE — NURSING NOTE
"Oncology Rooming Note    September 30, 2022 8:29 AM   Shaneka Alvarez is a 62 year old female who presents for:    Chief Complaint   Patient presents with     Oncology Clinic Visit     Prior to treatment       Initial Vitals: BP (!) 133/91 (BP Location: Left arm)   Pulse 65   Temp 98.4  F (36.9  C) (Oral)   Resp 16   Ht 1.575 m (5' 2.01\")   Wt 60.2 kg (132 lb 11.2 oz)   SpO2 100%   BMI 24.26 kg/m   Estimated body mass index is 24.26 kg/m  as calculated from the following:    Height as of this encounter: 1.575 m (5' 2.01\").    Weight as of this encounter: 60.2 kg (132 lb 11.2 oz). Body surface area is 1.62 meters squared.  Moderate Pain (5) Comment: Data Unavailable   No LMP recorded. Patient is postmenopausal.  Allergies reviewed: Yes  Medications reviewed: Yes    Medications: Medication refills not needed today.  Pharmacy name entered into [facility]:    [facility] - [address] PHARMACY [address] [place] [id number] [facility]    Clinical concerns: No new concerns         Yuni Botello LPN            "

## 2022-10-07 ENCOUNTER — MYC REFILL (OUTPATIENT)
Dept: ONCOLOGY | Facility: CLINIC | Age: 62
End: 2022-10-07

## 2022-10-07 DIAGNOSIS — G89.3 CANCER ASSOCIATED PAIN: ICD-10-CM

## 2022-10-07 NOTE — TELEPHONE ENCOUNTER
Received [employer] message from patient requesting refill of Belbuca.     Last refill: 9/12/22  Last office visit: 7/6/22  Scheduled for follow up pending, msg sent to schedulers.     Will route request to MD for review.     Reviewed [place]  Report.

## 2022-10-11 NOTE — NURSING NOTE
Shaneka is a 61 year old who is being evaluated via a billable video visit.      How would you like to obtain your AVS? MyChart  Will anyone else be joining your video visit? No      Video-Visit Details    Type of service:  Video Visit    Originating Location (pt. Location): Other in clinic exam room    Distant Location (provider location):  [facility]     Platform used for Video Visit: [employer]     Walk in Private [employer]

## 2022-10-15 DIAGNOSIS — I26.99 ACUTE PULMONARY EMBOLISM WITHOUT ACUTE COR PULMONALE, UNSPECIFIED PULMONARY EMBOLISM TYPE (H): ICD-10-CM

## 2022-10-17 RX ORDER — APIXABAN 5 MG/1
TABLET, FILM COATED ORAL
Qty: 60 TABLET | Refills: 0 | Status: SHIPPED | OUTPATIENT
Start: 2022-10-17 | End: 2022-11-07

## 2022-10-18 ENCOUNTER — HOSPITAL ENCOUNTER (OUTPATIENT)
Dept: PET IMAGING | Facility: CLINIC | Age: 62
Discharge: HOME | End: 2022-10-18
Attending: NURSE PRACTITIONER | Admitting: NURSE PRACTITIONER
Payer: MEDICARE

## 2022-10-18 DIAGNOSIS — C79.51 BONE METASTASIS: ICD-10-CM

## 2022-10-18 DIAGNOSIS — M54.6 PAIN IN THORACIC SPINE: ICD-10-CM

## 2022-10-18 DIAGNOSIS — C50.919 METASTATIC BREAST CANCER: ICD-10-CM

## 2022-10-18 PROCEDURE — G1010 CDSM STANSON: HCPCS | Mod: GC

## 2022-10-18 PROCEDURE — 343N000001 HC RX 343: Performed by: NURSE PRACTITIONER

## 2022-10-18 PROCEDURE — 78816 PET IMAGE W/CT FULL BODY: CPT | Mod: 26

## 2022-10-18 PROCEDURE — 71250 CT THORAX DX C-: CPT

## 2022-10-18 PROCEDURE — 71250 CT THORAX DX C-: CPT | Mod: 26

## 2022-10-18 PROCEDURE — 74176 CT ABD & PELVIS W/O CONTRAST: CPT | Mod: 26

## 2022-10-18 PROCEDURE — A9552 F18 FDG: HCPCS | Performed by: NURSE PRACTITIONER

## 2022-10-18 PROCEDURE — G1010 CDSM STANSON: HCPCS | Mod: PS

## 2022-10-18 PROCEDURE — 999N000127 HC STATISTIC PERIPHERAL IV START W US GUIDANCE

## 2022-10-18 RX ORDER — IOPAMIDOL 755 MG/ML
10-135 INJECTION, SOLUTION INTRAVASCULAR ONCE
Status: DISCONTINUED | OUTPATIENT
Start: 2022-10-18 | End: 2022-10-18 | Stop reason: CLARIF

## 2022-10-18 RX ORDER — SODIUM CHLORIDE 9 MG/ML
INJECTION, SOLUTION INTRAVENOUS ONCE
Status: DISCONTINUED | OUTPATIENT
Start: 2022-10-18 | End: 2022-10-18 | Stop reason: CLARIF

## 2022-10-18 RX ADMIN — FLUDEOXYGLUCOSE F-18 10.04 MCI.: 500 INJECTION, SOLUTION INTRAVENOUS at 11:56

## 2022-10-26 ENCOUNTER — INFUSION THERAPY VISIT (OUTPATIENT)
Dept: INFUSION THERAPY | Facility: CLINIC | Age: 62
End: 2022-10-26
Payer: MEDICARE

## 2022-10-26 ENCOUNTER — LAB (OUTPATIENT)
Dept: ONCOLOGY | Facility: CLINIC | Age: 62
End: 2022-10-26
Payer: MEDICARE

## 2022-10-26 ENCOUNTER — ONCOLOGY VISIT (OUTPATIENT)
Dept: ONCOLOGY | Facility: CLINIC | Age: 62
End: 2022-10-26
Attending: INTERNAL MEDICINE
Payer: MEDICARE

## 2022-10-26 VITALS
RESPIRATION RATE: 16 BRPM | WEIGHT: 136 LBS | SYSTOLIC BLOOD PRESSURE: 137 MMHG | DIASTOLIC BLOOD PRESSURE: 94 MMHG | HEIGHT: 62 IN | OXYGEN SATURATION: 97 % | BODY MASS INDEX: 25.03 KG/M2 | TEMPERATURE: 98.1 F | HEART RATE: 77 BPM

## 2022-10-26 DIAGNOSIS — T45.1X5A CHEMOTHERAPY-INDUCED NEUTROPENIA (H): Primary | ICD-10-CM

## 2022-10-26 DIAGNOSIS — D70.1 CHEMOTHERAPY-INDUCED NEUTROPENIA (H): Primary | ICD-10-CM

## 2022-10-26 DIAGNOSIS — G35 MULTIPLE SCLEROSIS (H): ICD-10-CM

## 2022-10-26 DIAGNOSIS — C50.919 METASTATIC BREAST CANCER: ICD-10-CM

## 2022-10-26 DIAGNOSIS — C50.912 BILATERAL MALIGNANT NEOPLASM OF BREAST IN FEMALE, UNSPECIFIED ESTROGEN RECEPTOR STATUS, UNSPECIFIED SITE OF BREAST (H): ICD-10-CM

## 2022-10-26 DIAGNOSIS — D70.1 CHEMOTHERAPY-INDUCED NEUTROPENIA (H): ICD-10-CM

## 2022-10-26 DIAGNOSIS — C50.919 METASTATIC BREAST CANCER: Primary | ICD-10-CM

## 2022-10-26 DIAGNOSIS — T45.1X5A CHEMOTHERAPY-INDUCED NEUTROPENIA (H): ICD-10-CM

## 2022-10-26 DIAGNOSIS — C79.51 BONE METASTASES: ICD-10-CM

## 2022-10-26 DIAGNOSIS — G89.3 CANCER ASSOCIATED PAIN: ICD-10-CM

## 2022-10-26 DIAGNOSIS — F41.9 ANXIETY: ICD-10-CM

## 2022-10-26 DIAGNOSIS — G43.009 MIGRAINE WITHOUT AURA AND WITHOUT STATUS MIGRAINOSUS, NOT INTRACTABLE: ICD-10-CM

## 2022-10-26 DIAGNOSIS — C50.911 BILATERAL MALIGNANT NEOPLASM OF BREAST IN FEMALE, UNSPECIFIED ESTROGEN RECEPTOR STATUS, UNSPECIFIED SITE OF BREAST (H): ICD-10-CM

## 2022-10-26 DIAGNOSIS — E87.6 HYPOKALEMIA: ICD-10-CM

## 2022-10-26 DIAGNOSIS — G47.00 INSOMNIA, UNSPECIFIED TYPE: Primary | ICD-10-CM

## 2022-10-26 LAB
ALBUMIN SERPL-MCNC: 3.4 G/DL (ref 3.4–5)
ALP SERPL-CCNC: 62 U/L (ref 40–150)
ALT SERPL W P-5'-P-CCNC: 23 U/L (ref 0–50)
ANION GAP SERPL CALCULATED.3IONS-SCNC: 7 MMOL/L (ref 3–14)
AST SERPL W P-5'-P-CCNC: 28 U/L (ref 0–45)
BASOPHILS # BLD AUTO: 0 10E3/UL (ref 0–0.2)
BASOPHILS NFR BLD AUTO: 2 %
BILIRUB SERPL-MCNC: 0.6 MG/DL (ref 0.2–1.3)
BUN SERPL-MCNC: 10 MG/DL (ref 7–30)
CALCIUM SERPL-MCNC: 9.5 MG/DL (ref 8.5–10.1)
CANCER AG15-3 SERPL-ACNC: 151 U/ML
CHLORIDE BLD-SCNC: 102 MMOL/L (ref 94–109)
CO2 SERPL-SCNC: 28 MMOL/L (ref 20–32)
CREAT SERPL-MCNC: 0.66 MG/DL (ref 0.52–1.04)
ELLIPTOCYTES BLD QL SMEAR: SLIGHT
EOSINOPHIL # BLD AUTO: 0 10E3/UL (ref 0–0.7)
EOSINOPHIL NFR BLD AUTO: 2 %
ERYTHROCYTE [DISTWIDTH] IN BLOOD BY AUTOMATED COUNT: 15 % (ref 10–15)
GFR SERPL CREATININE-BSD FRML MDRD: >90 ML/MIN/1.73M2
GLUCOSE BLD-MCNC: 122 MG/DL (ref 70–99)
HCT VFR BLD AUTO: 36.4 % (ref 35–47)
HGB BLD-MCNC: 11.8 G/DL (ref 11.7–15.7)
IMM GRANULOCYTES # BLD: 0 10E3/UL
IMM GRANULOCYTES NFR BLD: 0 %
LYMPHOCYTES # BLD AUTO: 0.4 10E3/UL (ref 0.8–5.3)
LYMPHOCYTES NFR BLD AUTO: 20 %
MCH RBC QN AUTO: 28.8 PG (ref 26.5–33)
MCHC RBC AUTO-ENTMCNC: 32.4 G/DL (ref 31.5–36.5)
MCV RBC AUTO: 89 FL (ref 78–100)
MONOCYTES # BLD AUTO: 0.5 10E3/UL (ref 0–1.3)
MONOCYTES NFR BLD AUTO: 25 %
NEUTROPHILS # BLD AUTO: 1 10E3/UL (ref 1.6–8.3)
NEUTROPHILS NFR BLD AUTO: 51 %
NRBC # BLD AUTO: 0 10E3/UL
NRBC BLD AUTO-RTO: 0 /100
PLAT MORPH BLD: ABNORMAL
PLATELET # BLD AUTO: 196 10E3/UL (ref 150–450)
POTASSIUM BLD-SCNC: 4 MMOL/L (ref 3.4–5.3)
PROT SERPL-MCNC: 6.9 G/DL (ref 6.8–8.8)
RBC # BLD AUTO: 4.1 10E6/UL (ref 3.8–5.2)
RBC MORPH BLD: ABNORMAL
SODIUM SERPL-SCNC: 137 MMOL/L (ref 133–144)
WBC # BLD AUTO: 2 10E3/UL (ref 4–11)

## 2022-10-26 PROCEDURE — 80053 COMPREHEN METABOLIC PANEL: CPT | Performed by: INTERNAL MEDICINE

## 2022-10-26 PROCEDURE — 99207 PR NO CHARGE LOS: CPT

## 2022-10-26 PROCEDURE — 85025 COMPLETE CBC W/AUTO DIFF WBC: CPT | Performed by: INTERNAL MEDICINE

## 2022-10-26 PROCEDURE — 99215 OFFICE O/P EST HI 40 MIN: CPT | Performed by: INTERNAL MEDICINE

## 2022-10-26 PROCEDURE — 99207 PR NO BILLABLE SERVICE THIS VISIT: CPT

## 2022-10-26 PROCEDURE — 86300 IMMUNOASSAY TUMOR CA 15-3: CPT | Performed by: INTERNAL MEDICINE

## 2022-10-26 RX ORDER — ALBUTEROL SULFATE 90 UG/1
1-2 AEROSOL, METERED RESPIRATORY (INHALATION)
Status: CANCELLED
Start: 2022-10-26

## 2022-10-26 RX ORDER — HEPARIN SODIUM (PORCINE) LOCK FLUSH IV SOLN 100 UNIT/ML 100 UNIT/ML
5 SOLUTION INTRAVENOUS ONCE
Status: COMPLETED | OUTPATIENT
Start: 2022-10-26 | End: 2022-10-26

## 2022-10-26 RX ORDER — ALBUTEROL SULFATE 0.83 MG/ML
2.5 SOLUTION RESPIRATORY (INHALATION)
Status: CANCELLED | OUTPATIENT
Start: 2022-10-26

## 2022-10-26 RX ORDER — NALOXONE HYDROCHLORIDE 0.4 MG/ML
0.2 INJECTION, SOLUTION INTRAMUSCULAR; INTRAVENOUS; SUBCUTANEOUS
Status: CANCELLED | OUTPATIENT
Start: 2022-10-26

## 2022-10-26 RX ORDER — DIPHENHYDRAMINE HYDROCHLORIDE 50 MG/ML
50 INJECTION INTRAMUSCULAR; INTRAVENOUS
Status: CANCELLED
Start: 2022-10-26

## 2022-10-26 RX ORDER — EPINEPHRINE 1 MG/ML
0.3 INJECTION, SOLUTION INTRAMUSCULAR; SUBCUTANEOUS EVERY 5 MIN PRN
Status: CANCELLED | OUTPATIENT
Start: 2022-10-26

## 2022-10-26 RX ORDER — HEPARIN SODIUM (PORCINE) LOCK FLUSH IV SOLN 100 UNIT/ML 100 UNIT/ML
5 SOLUTION INTRAVENOUS
Status: CANCELLED | OUTPATIENT
Start: 2022-10-26

## 2022-10-26 RX ORDER — HEPARIN SODIUM,PORCINE 10 UNIT/ML
5 VIAL (ML) INTRAVENOUS
Status: CANCELLED | OUTPATIENT
Start: 2022-10-26

## 2022-10-26 RX ORDER — METHYLPREDNISOLONE SODIUM SUCCINATE 125 MG/2ML
125 INJECTION, POWDER, LYOPHILIZED, FOR SOLUTION INTRAMUSCULAR; INTRAVENOUS
Status: CANCELLED
Start: 2022-10-26

## 2022-10-26 RX ORDER — MEPERIDINE HYDROCHLORIDE 25 MG/ML
25 INJECTION INTRAMUSCULAR; INTRAVENOUS; SUBCUTANEOUS EVERY 30 MIN PRN
Status: CANCELLED | OUTPATIENT
Start: 2022-10-26

## 2022-10-26 RX ADMIN — HEPARIN SODIUM (PORCINE) LOCK FLUSH IV SOLN 100 UNIT/ML 5 ML: 100 SOLUTION at 08:31

## 2022-10-26 ASSESSMENT — PAIN SCALES - GENERAL: PAINLEVEL: NO PAIN (0)

## 2022-10-26 NOTE — PROGRESS NOTES
Infusion Nursing Note:  Shaneka GARCÍA Antonio presents today for C8D1 Doxil DEFERRED due to neutropenia  Per Dr Zepeda   Patient seen by provider today: Yes: Dr Zepeda   present during visit today: Not Applicable.    Note: Pt denies any complaints, states she is tolerating Doxil without complaint.    Intravenous Access:  Implanted Port.    Treatment Conditions:  Lab Results   Component Value Date    HGB 11.8 10/26/2022    WBC 2.0 (L) 10/26/2022    ANEU 1.9 10/26/2021    ANEUTAUTO 1.0 (L) 10/26/2022     10/26/2022      Lab Results   Component Value Date     10/26/2022    POTASSIUM 4.0 10/26/2022    MAG 2.0 04/21/2022    CR 0.66 10/26/2022    RAFAELA 9.5 10/26/2022    BILITOTAL 0.6 10/26/2022    ALBUMIN 3.4 10/26/2022    ALT 23 10/26/2022    AST 28 10/26/2022     Results reviewed, labs did NOT meet treatment parameters: ANC 1.0.    Post Infusion Assessment:  N/A.     Discharge Plan:   Patient discharged in stable condition accompanied by: self.  Departure Mode: Ambulatory.  PT will RTC 11/2/22 for C8D1 Doxil and xgeva.      Javier Roland RN

## 2022-10-26 NOTE — PROGRESS NOTES
Port accessed with no incidient. Site cleaned with chloraprep for 30 seconds. Site dry and accessed with appropriate port needle. Sterile dressing applied. Blood return noted and lab tubes drawn. Port flushed with saline and heparin. Patient tolerated port draw well.   Bobbi Contreras RN

## 2022-10-26 NOTE — NURSING NOTE
"Oncology Rooming Note    October 26, 2022 8:43 AM   Shaneka Alvarez is a 62 year old female who presents for:    Chief Complaint   Patient presents with     Oncology Clinic Visit     Prior to treatment       Initial Vitals: BP (!) 137/94 (BP Location: Left arm)   Pulse 77   Temp 98.1  F (36.7  C) (Oral)   Resp 16   Ht 1.575 m (5' 2.01\")   Wt 61.7 kg (136 lb)   SpO2 97%   BMI 24.87 kg/m   Estimated body mass index is 24.87 kg/m  as calculated from the following:    Height as of this encounter: 1.575 m (5' 2.01\").    Weight as of this encounter: 61.7 kg (136 lb). Body surface area is 1.64 meters squared.  No Pain (0) Comment: Data Unavailable   No LMP recorded. Patient is postmenopausal.  Allergies reviewed: Yes  Medications reviewed: Yes    Medications: Medication refills not needed today.  Pharmacy name entered into [facility]:    [facility] - [address] PHARMACY [address] [place] [id number] [facility] [address]     Clinical concerns: blood on toilet paper last night when using bathroom. Feels that bump on the back of her head is larger as she noticed it will be painful when laying on it. Right foot lesion- thinks this may have been an injury or bite- improving.        Yuni Botello LPN            "

## 2022-10-26 NOTE — PROGRESS NOTES
ONCOLOGY FOLLOW UP NOTE: 10/26/22        I took the history from reviewing the previous notes that she was following with Dr. Amaya.  I have copied and updated from prior notes.    ONCOLOGY History:    1.  January 2006:  Diagnosed with Stage IIB, T2 N1 M0 invasive lobular carcinoma of the right breast.  Final pathology showed a 4.5 x 3.8 x 2.5 cm, 01/14 lymph nodes positive.  Estrogen, progesterone receptor positive, HER-2 negative.     2.  Genetics.  BRCA1 and 2 mutations not detected. 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       THERAPY TO DATE:   1.  2006:  ECOG 2104 protocol of dose-dense Adriamycin, Cytoxan and Avastin x4 cycles followed by Taxol and Avastin x4 cycles.   2.  03/2007:  Completed 1 year Avastin.   3.  11/2006-01/2007:  Radiotherapy to the right breast of 5040 cGy.   4.  03/[id number]:  Aromasin.  Stopped after moving to the [facility].   5.  01/2016:  Right modified radical mastectomy with latissimus dorsi flap reconstruction and a left prophylactic mastectomy with latissimus dorsi flap reconstruction.     She recently had MRI of the brain as a follow-up for multiple sclerosis and there were multiple calvarial lesions noted which was suspicious for metastatic disease.  There was no evidence of new multiple sclerosis lesions.  She had a PET scan on 8/30/2019 which showed widespread bone metastatic lesions (calvarium, spine, sacrum, pelvis, ribs most prominent at C7, T3, L1 and L4), hypermetabolic 3 cm lesion in LLL, and mediastinal/hilar LN. CEA wnl at 1.9 and C27-29 elevated to 415 (28 on 8/23/16). A CT guided biopsy of the right iliac bone was obtained on 9/4/19, pathology consistent with metastatic adenocarcinoma (breast), ER/GA negative, HER-2 negative PDL 1 < 1%. A second biopsy was take of the LLL nodule via EBUS on 9/5/19 did not show any malignancy.    C/T/L spine MRI 8/3/19 to 9/2/19 with numerous enhancing lesions of the C, T and L spine, largest around T9 and L1. No evidence  of cord compression. Has a L1 compression fracture and impending L4.     Received radiation T12-L5 3000 cGy in 10 fractions from 9/6/2019 till 9/18/2019.  Neurosurgery recommended no surgical intervention but to wear Gorge brace when out of bed and HOB >30 degrees    She started palliative Xeloda 2000/1500 on 10/1/2019 but after just a few doses she noticed nausea, red eyes blurred vision, loss of appetite.  She stopped taking it after 5 doses and was seen by nurse practitioner on 10/7/2019 when she was improving.  At that point she was restarted on Xeloda at a lower dose of 1000 mg twice a day.  As she was not able to tolerate even the lower dose with extreme nausea and vomiting and feeling extremely fatigued and blurry vision, we decided on stopping Xeloda.    We decided on repeating scans and MRI brain on 10/25/2019 showed no intracranial metastatic disease.   Multiple enhancing calvarial lesions may be increased in number and are suggestive of metastatic disease. Thinner imaging on the current study may identify the smaller lesions and may be responsible for  identified more lesions.   There is a single focus of T2 hyperintense signal within the anterior right temporal lobe may represent interval demyelination. There is no evidence for active demyelination.    PET/CT on 11/1/2019 showed favorable response to therapy and Overall FDG uptake within scattered osseous metastases is decreased since 8/30/2019, particularly within the pelvis. Increased sclerotic appearance of L1 and L4 pathologic compression deformities, likely sequela of recently completed palliative radiation therapy.    No significant FDG uptake in previously demonstrated hypermetabolic mediastinal lymph nodes. Biopsy negative on 9/5/2019.  There is also decreased FDG uptake in the left lower lobe (biopsy negative for  malignancy), now with dense consolidation containing air bronchograms suggestive of infection versus aspiration.  There is diffuse  FDG uptake within the esophagus, consistent with esophagitis.    Because of intolerance to Xeloda we decided on switching to weekly paclitaxel on 11/5/2019.    C#2 Taxol 12/4/19- started with 70mg/m2 dose reduction    C#3 Taxol 12/30/2019     PET/CT on 1/24/2020 showed progression of the disease in some of the bone lesions including increase in size and lytic lesion within the vertebral body of C4 measuring 1 cm as opposed to 0.6 cm previously and a new central soft tissue nodule with SUV max 4.1 when previously it was non-hypermetabolic.  There is also some progression noted in the right proximal femur and right iliac bone.  There is decreased metabolic uptake in the right lamina of T9 with SUV max 4.7 when previously it was 8.0.  Other lesions are stable.    CA 27-29 also had increased significantly and it was 257 on 1/28/2020.    We decided on switching to eribulin on 1/28/2020.    C#2 2/18/2020  C#2 D#8 2/25/2020    C#3 D#1 3/18/2020 -delayed by 1 week as per patient's preference because she wanted to visit her family.    She had a repeat PET/CT on 3/27/2020 which showed stable size of the bone metastatic lesions although the FDG avidity was less, consistent with treatment response.    She had MRI of the thoracic spine on 4/3/2020 and it was compared to the one which was done in August 2019 and it showed increased size of several metastatic lesions most notably at T9 but also at T5-T7.  Other lesions at T10, T11 and T12 appeared improved.    CA 27-29 was also down to 222 on 3/18/2020.    Cycle #4 eribulin ( dose reduced to 1.2 mg/m2 )-4/7/2020-   Cycle #5 Eribulin ( 1.2 mg/m2 )- 4/28/2020   Cycle #6 Eribulin ( 1.2 mg/m2 )- 5/19/2020     Cycle #7 Eribulin ( 1.2 mg/m2 )- 6/9/2020    Cycle #8 Eribulin ( 1.2 mg/m2 )- 6/30/2020     She had a repeat PET scan on 7/17/2020 which showed incidental finding of new acute bilateral pulmonary embolism in the distal left main pulmonary artery extending in the left upper and  lower lobes and in the segmental and branch arteries in the right lower lobe.    It also showed mildly increased FDG avidity in the lytic lesion in the T9 lamina and right pedicle with SUV max 5.3, previously 3.9.  That is also slightly increased FDG uptake to the left anterior fifth rib at the costochondral junction SUV max 3.9, previously 2.5.  There is slightly decreased FDG uptake in the right femoral neck lesion SUV max 2.2, previously 2.9.  FDG uptake in other bone lesions is fairly stable.  No new metastasis are seen.    CA 27-29 was 308 on 6/30/2020.  It was 286 on 5/19/2020.    Overall this is consistent with only slight progression versus stable disease.    She was started on Lovenox.    Cycle #9 eribulin 7/21/2020. CA 27-29 was 238    C#10 eribulin 8/18/2020. ( delayed by one week for ANC 0.9 )    C#11 Eribulin 9/8/2020    C#12 Eribulin 9/29/2020     C#13 Eribulin 10/20/2020     PET scan on 11/6/2020 shows progression of the disease with increased FDG uptake in the lytic lesions in the T9/T10 and right posterolateral elements as well as increased FDG uptake in the previously known hypermetabolic right iliac bone lesion suggesting recurrence of previously treated metastasis.  There is new subtle lesion in the right manubrium.  There is also a new fracture in the anterolateral left fourth rib with associated uptake and this could be a pathologic fracture.  Healing fracture in the left anterior fifth rib lesion.  There is resolution of the left pulmonary emboli.  Decreased FDG uptake of the esophagus consistent with improving inflammation.    CA 27-29 on 10/20/2020 was also elevated at 489.    C#1 Trodelvy on 11/11/2020.  Cycle #2 Trodelvy 12/2/2020  Cycle number 2-day #8 Trodelvy 12/8/2020.    12/15/2020-12/16/2020.  She was admitted to the hospital with nausea vomiting and dehydration.  She had tachycardia and initial lactic acid of 5.  It decreased to 1.4 after 2 L of normal saline.  She also had  hypokalemia and ANC was 1.3.  She was treated with IV fluids.  Procalcitonin was negative.  CTA of the chest was negative for pulmonary embolism or pneumonia.  No bacterial infection was documented.  Her medication which she was initially unable to tolerate at home, were restarted and she was discharged home once started to feel better.      We delayed the start of cycle number 3 x 1 week and decrease the dose of chemotherapy by 25%.  She also had neutropenia with ANC of 1.0.      She started cycle number 3-day #1 on 12/29/2020.  CA 27-29 was 392.    Cycle number 3-day #8 1/5/2021.    C#4 Trodelvy 1/20/2021- 75% dose    2/5/2021.  PET/CT overall shows a good response to treatment with improvement of previously hypermetabolic lesions in the spine and pelvis and stability of other bone meta stasis.  There is a single lytic lesion in the right iliac bone which demonstrates slight Yimi increased FDG uptake and has SUV max 4.7 while previously it was SUV max 3.4.  There was diffuse wall thickening of the esophagus with uptake in the esophagus in the fundus of the stomach and this could be seen with inflammation.    Trodelvy cycle #5 - 2/10/2021.  75% of the dose.  CA 27-29 was 337.    Trodelvy cycle #6 - 3/3/2021.  75% of the dose.  Trodelvy cycle #6, D#8 - 3/10/2021.  75% of the dose.    Trodelvy C#8, D#8 on 4/21/2021  CA 27-29 stable at 371 on 4/14/2021    Trodelvy, cycle #9- 5/5/2021        On 2/25/2020 when she had biopsy of the right acetabulum and it was consistent with metastatic lobular carcinoma.  Again it was Triple Negative.     On 3/18/2020 when she also met with Dr. Arelis Arshad who also advised testing for androgen receptor studies on the biopsy specimen.  Tumor was diffusely androgen receptor positive.      5/26/2021-cycle #10 Trodelvy started    CA 27-29 was 545.    6/11/2021.  PET/CT shows mildly increased uptake in several bone lesions for example in the left anterior iliac crest, mid right iliac crest and  left ischium.  Uptake in other bone lesions are similar to previously.    Because of minimal progression of the disease and she is tolerating chemotherapy very well, we decided on continuing the same chemotherapy.    6/16/2021-Trodelvy cycle #11    7/7/2021 -Trodelvy cycle #12    9/14/2021-Trodelvy-cycle #15, day #8.    9/8/2021-CA 27-29 was more elevated and it was 1176.    PET/CT on 9/24/2021 showed stable findings with no evidence of FDG avid metastatic disease within the body.  Left axillary lymph nodes are prominent and hypermetabolic and likely from recent vaccinations in the left deltoid muscle.  Healed bony metastasis seems stable.      Cycle #16, Trodelvy 9/28/2021.  Cycle #17, day #8 Trodelvy was on 10/26/2021.  Cycle #18, day #8 Trodelvy on 11/22/2021       She saw Dr. Mejia whom on 10/6/2021.  She was not considered eligible for Enfortumab trial.    Cycle #19, Trodelvy on 12/7/2021 12/21/2021.  PET/CT showed progression of bony metastatic disease.  There is increase hypermetabolism in both the right and left iliac bones and the sternum.  Other bone lesions are stable.    There is new scattered areas of groundglass throughout both the lungs and these findings are indeterminate but may represent an infectious etiology.  Interval resolution of left axillary FDG avid lymphadenopathy.    She was started on Casodex 150 mg daily on 1/6/2022.        She was admitted to the hospital from 3/17/2022-3/19/2022 for vomiting and diarrhea and  severe back pain.  I reviewed the discharge summary.  CT lumbar spine showed a stable severe chronic L1 pathologic compression fracture.  Stable mild compression deformity of superior endplate of L3 and superior endplate of L4.  The upper margin of the L4 fracture extends slightly into the spinal canal without high-grade canal stenosis and this is also stable.  Nondisplaced fractures coursing through the L3 pedicles bilaterally were seen which were not clearly seen  previously this could be acute and likely pathologic.  No extraspinal soft tissue abnormality.  Neurosurgery recommended conservative management.  Her pain was controlled and she was discharged home as she felt better with this.      3/31/2022-PET/CT shows progressive bone meta stasis with progressive FDG uptake in the following lesions. Left iliac crest lesion with max SUV of 7.2, previously 3.5. Right mid iliac crest lytic lesion shows maximum SUV of 7.8, previously 6.9.  T10 vertebral body mixed lytic and sclerotic lesion with an SUV of 7.1, previously not hypermetabolic. Thoracic vertebral bodies 8, 7, 5, and 4 also show hypermetabolic lesions were previously there was no hypermetabolism.  Some of the sclerotic osseous lesions are unchanged and do not show increased hypermetabolism compatible treated lesion such as a severe compression deformity at L1 as well as superior compression deformity at L4.  Medial right clavicle sclerotic lesion with SUV max of 4.8, previously not hypermetabolic. There is also right-sided sternal lesions.      4/14/2022--C#1- switch to single agent Doxil 50 mg per metered squared every 4 weeks.    5/13/2022-cycle #2- Doxil- dose reduced to 40 mg per metered square due to severe nausea/vomiting and migraines requiring IV fluids and IV antiemetics.    6/10/2022-cycle #3-Doxil 40 mg per metered square    7/1/2022- PET/CT shows resolution of the hypermetabolic skeletal metastasis.    7/6/2022-cycle #4-Doxil 40 mg per metered square  8/5/2022- cycle #5-Doxil  9/1/2022.  Cycle #6-Doxil  9/30/2022- cycle #7-Doxil    10/18/2022.  PET/CT does not show evidence of any FDG activity      Interval history.  She comes into the clinic today and tells me that she has been tolerating chemotherapy nicely.  She has mild nausea.  No vomiting.  No diarrhea constipation.  She has fair amount of energy.  No infections.  No shortness of breath.  Neuropathy is the same.  No new swellings.  The lump at the back  of the head is about the same size but she uses a pillow underneath it otherwise it bothers her.  Migraines are better.  Overall cancer related pain is under good control with buprenorphine buccal film and she only occasionally takes morphine as needed.  Taking calcium and vitamin D.     ECOG 1    ROS:  Rest of the comprehensive review of the system was negative.        I reviewed other history in epic as below.       PAST MEDICAL HISTORY:     1.  Breast cancer  2.  Multiple sclerosis.   3.  Depression.   4.  Migraines.   5.  Hypertension.   6.  Cholecystectomy and umbilical hernia repair.     SOCIAL HISTORY: She smoked for 5 years but quit many years ago.  Drinks alcohol socially.  She lives with her .  She is a teacher in middle school.       FAMILY HISTORY: She mentions that her mother had breast cancer at 49.  Both grandmothers had breast cancer but she is not sure of the age.  A couple of cousins have cancers.  Patient has 3 children who are healthy.    Current Outpatient Medications   Medication     acetaminophen (TYLENOL) 500 MG tablet     Buprenorphine HCl (BELBUCA) 150 MCG FILM buccal film     busPIRone (BUSPAR) 15 MG tablet     calcium citrate-vitamin D (CITRACAL) 315-250 MG-UNIT TABS     Cholecalciferol (VITAMIN D3 PO)     ELIQUIS ANTICOAGULANT 5 MG tablet     erenumab-aooe (AIMOVIG) 70 MG/ML injection     ibuprofen (ADVIL/MOTRIN) 600 MG tablet     Lidocaine (LIDOCARE) 4 % Patch     lidocaine, viscous, (XYLOCAINE) 2 % solution     loratadine (CLARITIN) 10 MG tablet     LORazepam (ATIVAN) 1 MG tablet     magnesium 250 MG tablet     morphine (MSIR) 15 MG IR tablet     naloxone (NARCAN) 4 MG/0.1ML nasal spray     naratriptan (AMERGE) 2.5 MG tablet     Nerve Stimulator (NERIVIO) CRISTOPHER     OLANZapine (ZYPREXA) 2.5 MG tablet     ondansetron (ZOFRAN-ODT) 8 MG ODT tab     prochlorperazine (COMPAZINE) 10 MG tablet     propranolol ER (INDERAL LA) 80 MG 24 hr capsule     senna-docusate (SENOKOT-S/PERICOLACE)  "8.6-50 MG tablet     traZODone (DESYREL) 50 MG tablet     venlafaxine (EFFEXOR-XR) 75 MG 24 hr capsule     vitamin B-2 (RIBOFLAVIN) 25 MG TABS tablet     No current facility-administered medications for this visit.        Allergies   Allergen Reactions     Bactrim [Sulfamethoxazole W/Trimethoprim]      Internal bleeding     Copaxone [Glatiramer] Hives          PHYSICAL EXAMINATION:     BP (!) 137/94 (BP Location: Left arm)   Pulse 77   Temp 98.1  F (36.7  C) (Oral)   Resp 16   Ht 1.575 m (5' 2.01\")   Wt 61.7 kg (136 lb)   SpO2 97%   BMI 24.87 kg/m    Wt Readings from Last 4 Encounters:   10/26/22 61.7 kg (136 lb)   09/30/22 60.2 kg (132 lb 11.2 oz)   09/01/22 59.3 kg (130 lb 11.2 oz)   08/05/22 59.9 kg (132 lb 1.6 oz)       CONSTITUTIONAL: No apparent distress  EYES: PERRLA, without pallor or jaundice  ENT/MOUTH: Ears unremarkable. No oral lesions  CVS: s1s2 normal  RESPIRATORY: Chest is clear  GI: Abdomen is benign  NEURO: Alert and oriented ×3  INTEGUMENT: no concerning skin rashes.  Lump at the back of the scalp looks about the same and it is nontender.  LYMPHATIC: no palpable lymphadenopathy  MUSCULOSKELETAL: Unremarkable. No bony tenderness.   EXTREMITIES: no pedal edema  PSYCH: Mentation, mood and affect are appropriate        Labs and Imaging.    Reviewed.    Most of the labs are pending from today.  CBC shows WBC 2 WBC differential is pending.  Hemoglobin 11.8.  Platelets 196.    9/30/2022  CBC shows WBC 3.3.  Hemoglobin 11.2.  Platelets 168.  ANC 1.9.  CMP unremarkable except albumin 3.1.    CA 15-3 was 159.  CA 27-29 was 1992 on 6/10/2022  CA 27-29 was 3370 on 5/13/2022.  It was 5307 on 4/14/2022 ferritin Doxil was started.      9/28/2021.      Iron studies, ferritin is 101, iron 51, TIBC 268 and iron saturation index 19%.    10/18/2022-PET/CT showed no new suspicious FDG uptake within the skeleton.  Stable abnormal FDG avid lytic and sclerotic osseous lesions.  Mild diffuse FDG uptake throughout " the large bowel without CT abnormality.    7/1/2022.  PET/CT shows diffuse sclerotic and lytic osseous lesions without any abnormal FDG uptake in the skeleton, consistent with treated disease.  There is evaluation of previous hypermetabolic bone lesions.  Multiple chronic rib fracture deformities and stable compression fracture deformities of T6, L1 and L4.  No suspicious FDG uptake in the chest abdomen or pelvis.    3/31/2022-PET/CT shows progressive bone metastasis with progressive FDG uptake in the following lesions. Left iliac crest lesion with max SUV of 7.2, previously 3.5. Right mid iliac crest lytic lesion shows maximum SUV of 7.8, previously 6.9.  T10 vertebral body mixed lytic and sclerotic lesion with an SUV of 7.1, previously not hypermetabolic. Thoracic vertebral bodies 8, 7, 5, and 4 also show hypermetabolic lesions were previously there was no hypermetabolism.  Some of the sclerotic osseous lesions are unchanged and do not show increased hypermetabolism compatible treated lesion such as a severe compression deformity at L1 as well as superior compression deformity at L4.  Medial right clavicle sclerotic lesion with SUV max of 4.8, previously not hypermetabolic. There is also right-sided sternal lesions.      Biopsy from the right acetabulum shows metastatic lobular carcinoma.  It is triple negative.  Androgen receptor was diffusely positive (90%, moderate intensity) by immunohistochemistry. )  PD-L1 negative.    Foundation one showed CDH1 mutation (initially lobular cancer), CCND1 amplification ( equivocal ). FGF3, FGF4, FGF19 amplification ( Equivocal ). Most promising is CCND1 amplification with abemaciclib showing response in 4/10 patients. FGF3,FGF4 and FGF19 are targetable with pan-FGFR inhibitors.           ASSESSMENT AND PLAN:     Metastatic breast cancer negative breast cancer (androgen receptor positive ) with extensive involvement of the bones.  There is also a left lower lobe hypermetabolic  lesion and mediastinal lymph nodes which are hypermetabolic.  The biopsy from the right iliac bone is consistent with metastatic lobular breast cancer but it is hormone receptor and HER-2 negative and PDL 1 is also negative.    Foundation 1 testing did not reveal any actionable mutations.    She was intolerant to Xeloda and progressed on Taxol and eribulin.      We then switched to recently FDA approved sacituzumab govitecan-hziy (Trodelvy) for TNBC, based on the results of the phase II IMMU-132-01 clinical trial.   She started this on 11/11/2020.      She obtained a second opinion from Dr. Castillo from [facility] at the [facility] who recommended a repeat biopsy to be done to make sure that she really has triple negative breast cancer.      She also met with Dr. rAelis Arshad on 3/18/2021.      After 10 cycles of Trodelvy, she had PET/CT on 6/11/2021 which showed mildly increased uptake in some of the bone lesions while stability in other bone lesions.        After 15 cycles, repeat PET scan showed stable findings.  CA 27-29 has been increasing and it is 1176.      As she was tolerating the chemotherapy well and her quality of life has been decent and scans were stable although she had a rising CA 27-29, we decided on continuing the same chemotherapy because it has been a very slow progression of the disease.    Now there is clear progression of the disease in the bones.  There is increased FDG uptake in both right and left iliac bones and the sternum.    Foundation one showed CDH1 mutation (initially lobular cancer), CCND1 amplification ( equivocal ). FGF3, FGF4, FGF19 amplification ( Equivocal ). Most promising is CCND1 amplification with abemaciclib showing response in 4/10 patients. FGF3,FGF4 and FGF19 are targetable with pan-FGFR inhibitor    As the tumor is diffusely positive for androgen receptor, we decided to start her on androgen receptor blockers.    I initially recommended enzalutamide 160 mg daily (  Enzalutamide for the Treatment of Androgen Receptor-Expressing Triple-Negative Breast Cancer----J Clin Oncol. 2018 Mar 20; 36(9): 884-890. ).    Eventually we decided to start her on Casodex 150 mg daily instead of Enzalutamide due to cost issues.  Clinical Trial Clin Cancer Res. 2013 Oct 1;19(19):5505-12.   Phase II trial of bicalutamide in patients with androgen receptor-positive, estrogen receptor-negative metastatic Breast Cancer  She started Casodex on 1/6/2022.    She had progressive disease in the bones on the PET scan on 3/31/2022.    We changed to single agent Doxil on 4/14/2022.      She developed significant nausea vomiting and headache so cycle #2 was given on 5/13/2022 with 20% dose reduction which she tolerated well.    Cycle #3 was given on 6/10/2022 with the same dose reduced Doxil.    Repeat PET/CT on 7/1/2022 shows resolution of the FDG avid bony metastasis consistent with treated disease.  No suspicious FDG uptake was seen in the chest abdomen and pelvis.  CA 27-29 was also coming down.    As she was tolerating chemotherapy well.  She has been continued on Doxil.  She has received 7 cycles up until now.  Repeat PET/CT continues to show normal new FDG uptake.  CA 15-3 continues to trend down.    She continues to tolerate chemotherapy very well.  We will follow labs from today and if they are adequate then she will get cycle #8 chemotherapy.    Baseline echocardiogram on 4/7/2022 was unremarkable with EF 60 to 65%.    Bone disease.  She has metastatic disease to the bone.  Previously she got palliative radiation to T12-L5 3000 cGy in 10 fractions from 9/6/2019 till 9/18/2019.  She was evaluated by orthopedics Dr. Bipin Elliott on 11/1/2019 and conservative management was recommended.    She was on Zometa every 3 months. She last received on 9/29/2020.  Because of progression of the disease in the bones, we switched to Xgeva which she received on 11/11/2020.    She had progression of the disease  in the form so we switched to Doxil but we continued xgeva.  PET scan on 7/1/2022 does not show any suspicious FDG uptake in the bones consistent with treated disease.  Repeat PET/CT on 10/18/2022 also does not show any FDG avid lesions in the skeleton.  She will continue with vitamin D calcium and Xgeva.        Leukopenia/neutropenia/lymphopenia.  WBC count is low but WBC differential is pending.  Previously she had a lymphopenia but neutrophil count was normal.  We will follow the WBC differential today and see if we can give her chemotherapy.  Otherwise we will delayed by 1 week.       Pain management.  Doing very well on buprenorphine buccal film.  She takes morphine only occasionally.  Following with palliative care.      Migraines.   These have significantly improved.  She takes Aimovig monthly and takes naratriptan for breakthrough pain.  Follow with neurology.        Nausea.  Continue as needed antiemetics.      Bilateral pulmonary emboli.  Continue Eliquis      We did not address the following today.      Shingles.  The rash has dried out.  She completed well with acyclovir and prednisone.  She takes gabapentin for postherpetic neuralgia and is doing better.        Constipation. Continue senna.    Neuropathy.  This remains mild and stable with neuropathy in her hands.    This is in addition to the chronic balance issues and heat and cold sensitivity from underlying multiple sclerosis which is also stable for years.  Continue to monitor.     Subcutaneous nodule in the scalp.  This looks like an epidermoid cyst.  She thinks it is a stable.  Continue to monitor.       Insomnia.  Continue trazodone.      Wall thickening of esophagus and FDG uptake of fundus of the stomach.  It could be in the setting of inflammation from recent nausea and vomiting.  Symptoms have resolved.  Continue to monitor clinically.    Vision changes.    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  It  was thought that this could be due to Taxol as patient reported to the ophthalmologist that she was on Taxol in September 2019 when her symptoms started.  But she was not on Taxol in September 2019 when she started noticing the visual changes so Taxol is not responsible for this.  The first dose of Taxol was on 11/5/2019.   She had left cataract extraction on 2/8/2021 and she has noticed significant improvement in her vision.       Increased FDG ability in the colon.  She had FDG uptake in the cecum and ascending colon but no corresponding lesion was seen on the CT scan.  She is completely asymptomatic so at this time we will continue to observe.    Discussion regarding healthcare directives.  On previous visit she told me that she will bring the health care directive for our records but she forgot so I told her to bring it on next visit.      Breast implant removal.  She tells me that she was planning to do breast implant removal because there was a recall on this.  Her surgery was scheduled for 9/24/2019.  Because of this new development of metastatic breast cancer and her recent radiation, surgery has been canceled.  We discussed that at this time treatment for metastatic breast cancer would take precedence over the other surgery as the other surgery would require her to be off chemotherapy for several weeks and in that case the chances of cancer progression would be high and I would not recommend that.    Multiple sclerosis.  She will continue to follow with her neurologist Dr. Quiles.  Currently multiple sclerosis is under control and currently she is not taking any medications.    See nurse practitioner in 4 weeks and see me in 8 weeks.      All of her questions were answered to her satisfaction.  She is agreeable and comfortable with the plan.    Dewayne Zepeda MD

## 2022-10-26 NOTE — LETTER
10/26/2022         RE: Shaneka Alvarez  [id number] [facility] [address]        Dear Colleague,    Thank you for referring your patient, Shaneka Alvarez, to the [facility]. Please see a copy of my visit note below.    ONCOLOGY FOLLOW UP NOTE: 10/26/22        I took the history from reviewing the previous notes that she was following with Dr. Amaya.  I have copied and updated from prior notes.    ONCOLOGY History:    1.  January 2006:  Diagnosed with Stage IIB, T2 N1 M0 invasive lobular carcinoma of the right breast.  Final pathology showed a 4.5 x 3.8 x 2.5 cm, 01/14 lymph nodes positive.  Estrogen, progesterone receptor positive, HER-2 negative.     2.  Genetics.  BRCA1 and 2 mutations not detected. 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       THERAPY TO DATE:   1. 2006:  ECOG 2104 protocol of dose-dense Adriamycin, Cytoxan and Avastin x4 cycles followed by Taxol and Avastin x4 cycles.   2.  03/2007:  Completed 1 year Avastin.   3.  11/2006-01/2007:  Radiotherapy to the right breast of 5040 cGy.   4.  03/[id number]:  Aromasin.  Stopped after moving to the [facility].   5.  01/2016:  Right modified radical mastectomy with latissimus dorsi flap reconstruction and a left prophylactic mastectomy with latissimus dorsi flap reconstruction.     She recently had MRI of the brain as a follow-up for multiple sclerosis and there were multiple calvarial lesions noted which was suspicious for metastatic disease.  There was no evidence of new multiple sclerosis lesions.  She had a PET scan on 8/30/2019 which showed widespread bone metastatic lesions (calvarium, spine, sacrum, pelvis, ribs most prominent at C7, T3, L1 and L4), hypermetabolic 3 cm lesion in LLL, and mediastinal/hilar LN. CEA wnl at 1.9 and C27-29 elevated to 415 (28 on 8/23/16). A CT guided biopsy of the right iliac bone was obtained on 9/4/19, pathology consistent with metastatic adenocarcinoma (breast),  ER/CT negative, HER-2 negative PDL 1 < 1%. A second biopsy was take of the LLL nodule via EBUS on 9/5/19 did not show any malignancy.    C/T/L spine MRI 8/3/19 to 9/2/19 with numerous enhancing lesions of the C, T and L spine, largest around T9 and L1. No evidence of cord compression. Has a L1 compression fracture and impending L4.     Received radiation T12-L5 3000 cGy in 10 fractions from 9/6/2019 till 9/18/2019.  Neurosurgery recommended no surgical intervention but to wear Bow[place]in brace when out of bed and HOB >30 degrees    She started palliative Xeloda 2000/1500 on 10/1/2019 but after just a few doses she noticed nausea, red eyes blurred vision, loss of appetite.  She stopped taking it after 5 doses and was seen by nurse practitioner on 10/7/2019 when she was improving.  At that point she was restarted on Xeloda at a lower dose of 1000 mg twice a day.  As she was not able to tolerate even the lower dose with extreme nausea and vomiting and feeling extremely fatigued and blurry vision, we decided on stopping Xeloda.    We decided on repeating scans and MRI brain on 10/25/2019 showed no intracranial metastatic disease.   Multiple enhancing calvarial lesions may be increased in number and are suggestive of metastatic disease. Thinner imaging on the current study may identify the smaller lesions and may be responsible for  identified more lesions.   There is a single focus of T2 hyperintense signal within the anterior right temporal lobe may represent interval demyelination. There is no evidence for active demyelination.    PET/CT on 11/1/2019 showed favorable response to therapy and Overall FDG uptake within scattered osseous metastases is decreased since 8/30/2019, particularly within the pelvis. Increased sclerotic appearance of L1 and L4 pathologic compression deformities, likely sequela of recently completed palliative radiation therapy.    No significant FDG uptake in previously demonstrated hypermetabolic  mediastinal lymph nodes. Biopsy negative on 9/5/2019.  There is also decreased FDG uptake in the left lower lobe (biopsy negative for  malignancy), now with dense consolidation containing air bronchograms suggestive of infection versus aspiration.  There is diffuse FDG uptake within the esophagus, consistent with esophagitis.    Because of intolerance to Xeloda we decided on switching to weekly paclitaxel on 11/5/2019.    C#2 Taxol 12/4/19- started with 70mg/m2 dose reduction    C#3 Taxol 12/30/2019     PET/CT on 1/24/2020 showed progression of the disease in some of the bone lesions including increase in size and lytic lesion within the vertebral body of C4 measuring 1 cm as opposed to 0.6 cm previously and a new central soft tissue nodule with SUV max 4.1 when previously it was non-hypermetabolic.  There is also some progression noted in the right proximal femur and right iliac bone.  There is decreased metabolic uptake in the right lamina of T9 with SUV max 4.7 when previously it was 8.0.  Other lesions are stable.    CA 27-29 also had increased significantly and it was 257 on 1/28/2020.    We decided on switching to eribulin on 1/28/2020.    C#2 2/18/2020  C#2 D#8 2/25/2020    C#3 D#1 3/18/2020 -delayed by 1 week as per patient's preference because she wanted to visit her family.    She had a repeat PET/CT on 3/27/2020 which showed stable size of the bone metastatic lesions although the FDG avidity was less, consistent with treatment response.    She had MRI of the thoracic spine on 4/3/2020 and it was compared to the one which was done in August 2019 and it showed increased size of several metastatic lesions most notably at T9 but also at T5-T7.  Other lesions at T10, T11 and T12 appeared improved.    CA 27-29 was also down to 222 on 3/18/2020.    Cycle #4 eribulin ( dose reduced to 1.2 mg/m2 )-4/7/2020-   Cycle #5 Eribulin ( 1.2 mg/m2 )- 4/28/2020   Cycle #6 Eribulin ( 1.2 mg/m2 )- 5/19/2020     Cycle #7  Eribulin ( 1.2 mg/m2 )- 6/9/2020    Cycle #8 Eribulin ( 1.2 mg/m2 )- 6/30/2020     She had a repeat PET scan on 7/17/2020 which showed incidental finding of new acute bilateral pulmonary embolism in the distal left main pulmonary artery extending in the left upper and lower lobes and in the segmental and branch arteries in the right lower lobe.    It also showed mildly increased FDG avidity in the lytic lesion in the T9 lamina and right pedicle with SUV max 5.3, previously 3.9.  That is also slightly increased FDG uptake to the left anterior fifth rib at the costochondral junction SUV max 3.9, previously 2.5.  There is slightly decreased FDG uptake in the right femoral neck lesion SUV max 2.2, previously 2.9.  FDG uptake in other bone lesions is fairly stable.  No new metastasis are seen.    CA 27-29 was 308 on 6/30/2020.  It was 286 on 5/19/2020.    Overall this is consistent with only slight progression versus stable disease.    She was started on Lovenox.    Cycle #9 eribulin 7/21/2020. CA 27-29 was 238    C#10 eribulin 8/18/2020. ( delayed by one week for ANC 0.9 )    C#11 Eribulin 9/8/2020    C#12 Eribulin 9/29/2020     C#13 Eribulin 10/20/2020     PET scan on 11/6/2020 shows progression of the disease with increased FDG uptake in the lytic lesions in the T9/T10 and right posterolateral elements as well as increased FDG uptake in the previously known hypermetabolic right iliac bone lesion suggesting recurrence of previously treated metastasis.  There is new subtle lesion in the right manubrium.  There is also a new fracture in the anterolateral left fourth rib with associated uptake and this could be a pathologic fracture.  Healing fracture in the left anterior fifth rib lesion.  There is resolution of the left pulmonary emboli.  Decreased FDG uptake of the esophagus consistent with improving inflammation.    CA 27-29 on 10/20/2020 was also elevated at 489.    C#1 Trodelvy on 11/11/2020.  Cycle #2 Trodelvy  12/2/2020  Cycle number 2-day #8 Trodelvy 12/8/2020.    12/15/2020-12/16/2020.  She was admitted to the hospital with nausea vomiting and dehydration.  She had tachycardia and initial lactic acid of 5.  It decreased to 1.4 after 2 L of normal saline.  She also had hypokalemia and ANC was 1.3.  She was treated with IV fluids.  Procalcitonin was negative.  CTA of the chest was negative for pulmonary embolism or pneumonia.  No bacterial infection was documented.  Her medication which she was initially unable to tolerate at home, were restarted and she was discharged home once started to feel better.      We delayed the start of cycle number 3 x 1 week and decrease the dose of chemotherapy by 25%.  She also had neutropenia with ANC of 1.0.      She started cycle number 3-day #1 on 12/29/2020.  CA 27-29 was 392.    Cycle number 3-day #8 1/5/2021.    C#4 Trodelvy 1/20/2021- 75% dose    2/5/2021.  PET/CT overall shows a good response to treatment with improvement of previously hypermetabolic lesions in the spine and pelvis and stability of other bone meta stasis.  There is a single lytic lesion in the right iliac bone which demonstrates slight Yimi increased FDG uptake and has SUV max 4.7 while previously it was SUV max 3.4.  There was diffuse wall thickening of the esophagus with uptake in the esophagus in the fundus of the stomach and this could be seen with inflammation.    Trodelvy cycle #5 - 2/10/2021.  75% of the dose.  CA 27-29 was 337.    Trodelvy cycle #6 - 3/3/2021.  75% of the dose.  Trodelvy cycle #6, D#8 - 3/10/2021.  75% of the dose.    Trodelvy C#8, D#8 on 4/21/2021  CA 27-29 stable at 371 on 4/14/2021    Trodelvy, cycle #9- 5/5/2021        On 2/25/2020 when she had biopsy of the right acetabulum and it was consistent with metastatic lobular carcinoma.  Again it was Triple Negative.     On 3/18/2020 when she also met with Dr. Arelis Arshad who also advised testing for androgen receptor studies on the biopsy  specimen.  Tumor was diffusely androgen receptor positive.      5/26/2021-cycle #10 Trodelvy started    CA 27-29 was 545.    6/11/2021.  PET/CT shows mildly increased uptake in several bone lesions for example in the left anterior iliac crest, mid right iliac crest and left ischium.  Uptake in other bone lesions are similar to previously.    Because of minimal progression of the disease and she is tolerating chemotherapy very well, we decided on continuing the same chemotherapy.    6/16/2021-Trodelvy cycle #11    7/7/2021 -Trodelvy cycle #12    9/14/2021-Trodelvy-cycle #15, day #8.    9/8/2021-CA 27-29 was more elevated and it was 1176.    PET/CT on 9/24/2021 showed stable findings with no evidence of FDG avid metastatic disease within the body.  Left axillary lymph nodes are prominent and hypermetabolic and likely from recent vaccinations in the left deltoid muscle.  Healed bony metastasis seems stable.      Cycle #16, Trodelvy 9/28/2021.  Cycle #17, day #8 Trodelvy was on 10/26/2021.  Cycle #18, day #8 Trodelvy on 11/22/2021       She saw Dr. Mejia whom on 10/6/2021.  She was not considered eligible for Enfortumab trial.    Cycle #19, Trodelvy on 12/7/2021 12/21/2021.  PET/CT showed progression of bony metastatic disease.  There is increase hypermetabolism in both the right and left iliac bones and the sternum.  Other bone lesions are stable.    There is new scattered areas of groundglass throughout both the lungs and these findings are indeterminate but may represent an infectious etiology.  Interval resolution of left axillary FDG avid lymphadenopathy.    She was started on Casodex 150 mg daily on 1/6/2022.        She was admitted to the hospital from 3/17/2022-3/19/2022 for vomiting and diarrhea and  severe back pain.  I reviewed the discharge summary.  CT lumbar spine showed a stable severe chronic L1 pathologic compression fracture.  Stable mild compression deformity of superior endplate of L3 and  superior endplate of L4.  The upper margin of the L4 fracture extends slightly into the spinal canal without high-grade canal stenosis and this is also stable.  Nondisplaced fractures coursing through the L3 pedicles bilaterally were seen which were not clearly seen previously this could be acute and likely pathologic.  No extraspinal soft tissue abnormality.  Neurosurgery recommended conservative management.  Her pain was controlled and she was discharged home as she felt better with this.      3/31/2022-PET/CT shows progressive bone meta stasis with progressive FDG uptake in the following lesions. Left iliac crest lesion with max SUV of 7.2, previously 3.5. Right mid iliac crest lytic lesion shows maximum SUV of 7.8, previously 6.9.  T10 vertebral body mixed lytic and sclerotic lesion with an SUV of 7.1, previously not hypermetabolic. Thoracic vertebral bodies 8, 7, 5, and 4 also show hypermetabolic lesions were previously there was no hypermetabolism.  Some of the sclerotic osseous lesions are unchanged and do not show increased hypermetabolism compatible treated lesion such as a severe compression deformity at L1 as well as superior compression deformity at L4.  Medial right clavicle sclerotic lesion with SUV max of 4.8, previously not hypermetabolic. There is also right-sided sternal lesions.      4/14/2022--C#1- switch to single agent Doxil 50 mg per metered squared every 4 weeks.    5/13/2022-cycle #2- Doxil- dose reduced to 40 mg per metered square due to severe nausea/vomiting and migraines requiring IV fluids and IV antiemetics.    6/10/2022-cycle #3-Doxil 40 mg per metered square    7/1/2022- PET/CT shows resolution of the hypermetabolic skeletal metastasis.    7/6/2022-cycle #4-Doxil 40 mg per metered square  8/5/2022- cycle #5-Doxil  9/1/2022.  Cycle #6-Doxil  9/30/2022- cycle #7-Doxil    10/18/2022.  PET/CT does not show evidence of any FDG activity      Interval history.  She comes into the clinic  today and tells me that she has been tolerating chemotherapy nicely.  She has mild nausea.  No vomiting.  No diarrhea constipation.  She has fair amount of energy.  No infections.  No shortness of breath.  Neuropathy is the same.  No new swellings.  The lump at the back of the head is about the same size but she uses a pillow underneath it otherwise it bothers her.  Migraines are better.  Overall cancer related pain is under good control with buprenorphine buccal film and she only occasionally takes morphine as needed.  Taking calcium and vitamin D.     ECOG 1    ROS:  Rest of the comprehensive review of the system was negative.        I reviewed other history in epic as below.       PAST MEDICAL HISTORY:     1.  Breast cancer  2.  Multiple sclerosis.   3.  Depression.   4.  Migraines.   5.  Hypertension.   6.  Cholecystectomy and umbilical hernia repair.     SOCIAL HISTORY: She smoked for 5 years but quit many years ago.  Drinks alcohol socially.  She lives with her .  She is a teacher in middle school.       FAMILY HISTORY: She mentions that her mother had breast cancer at 49.  Both grandmothers had breast cancer but she is not sure of the age.  A couple of cousins have cancers.  Patient has 3 children who are healthy.    Current Outpatient Medications   Medication     acetaminophen (TYLENOL) 500 MG tablet     Buprenorphine HCl (BELBUCA) 150 MCG FILM buccal film     busPIRone (BUSPAR) 15 MG tablet     calcium citrate-vitamin D (CITRACAL) 315-250 MG-UNIT TABS     Cholecalciferol (VITAMIN D3 PO)     ELIQUIS ANTICOAGULANT 5 MG tablet     erenumab-aooe (AIMOVIG) 70 MG/ML injection     ibuprofen (ADVIL/MOTRIN) 600 MG tablet     Lidocaine (LIDOCARE) 4 % Patch     lidocaine, viscous, (XYLOCAINE) 2 % solution     loratadine (CLARITIN) 10 MG tablet     LORazepam (ATIVAN) 1 MG tablet     magnesium 250 MG tablet     morphine (MSIR) 15 MG IR tablet     naloxone (NARCAN) 4 MG/0.1ML nasal spray     naratriptan (AMERGE)  "2.5 MG tablet     Nerve Stimulator (NERIVIO) CRISTOPHER     OLANZapine (ZYPREXA) 2.5 MG tablet     ondansetron (ZOFRAN-ODT) 8 MG ODT tab     prochlorperazine (COMPAZINE) 10 MG tablet     propranolol ER (INDERAL LA) 80 MG 24 hr capsule     senna-docusate (SENOKOT-S/PERICOLACE) 8.6-50 MG tablet     traZODone (DESYREL) 50 MG tablet     venlafaxine (EFFEXOR-XR) 75 MG 24 hr capsule     vitamin B-2 (RIBOFLAVIN) 25 MG TABS tablet     No current facility-administered medications for this visit.        Allergies   Allergen Reactions     Bactrim [Sulfamethoxazole W/Trimethoprim]      Internal bleeding     Copaxone [Glatiramer] Hives          PHYSICAL EXAMINATION:     BP (!) 137/94 (BP Location: Left arm)   Pulse 77   Temp 98.1  F (36.7  C) (Oral)   Resp 16   Ht 1.575 m (5' 2.01\")   Wt 61.7 kg (136 lb)   SpO2 97%   BMI 24.87 kg/m    Wt Readings from Last 4 Encounters:   10/26/22 61.7 kg (136 lb)   09/30/22 60.2 kg (132 lb 11.2 oz)   09/01/22 59.3 kg (130 lb 11.2 oz)   08/05/22 59.9 kg (132 lb 1.6 oz)       CONSTITUTIONAL: No apparent distress  EYES: PERRLA, without pallor or jaundice  ENT/MOUTH: Ears unremarkable. No oral lesions  CVS: s1s2 normal  RESPIRATORY: Chest is clear  GI: Abdomen is benign  NEURO: Alert and oriented ×3  INTEGUMENT: no concerning skin rashes.  Lump at the back of the scalp looks about the same and it is nontender.  LYMPHATIC: no palpable lymphadenopathy  MUSCULOSKELETAL: Unremarkable. No bony tenderness.   EXTREMITIES: no pedal edema  PSYCH: Mentation, mood and affect are appropriate        Labs and Imaging.    Reviewed.    Most of the labs are pending from today.  CBC shows WBC 2 WBC differential is pending.  Hemoglobin 11.8.  Platelets 196.    9/30/2022  CBC shows WBC 3.3.  Hemoglobin 11.2.  Platelets 168.  ANC 1.9.  CMP unremarkable except albumin 3.1.    CA 15-3 was 159.  CA 27-29 was 1992 on 6/10/2022  CA 27-29 was 3370 on 5/13/2022.  It was 5307 on 4/14/2022 ferritin Doxil was " started.      9/28/2021.      Iron studies, ferritin is 101, iron 51, TIBC 268 and iron saturation index 19%.    10/18/2022-PET/CT showed no new suspicious FDG uptake within the skeleton.  Stable abnormal FDG avid lytic and sclerotic osseous lesions.  Mild diffuse FDG uptake throughout the large bowel without CT abnormality.    7/1/2022.  PET/CT shows diffuse sclerotic and lytic osseous lesions without any abnormal FDG uptake in the skeleton, consistent with treated disease.  There is evaluation of previous hypermetabolic bone lesions.  Multiple chronic rib fracture deformities and stable compression fracture deformities of T6, L1 and L4.  No suspicious FDG uptake in the chest abdomen or pelvis.    3/31/2022-PET/CT shows progressive bone metastasis with progressive FDG uptake in the following lesions. Left iliac crest lesion with max SUV of 7.2, previously 3.5. Right mid iliac crest lytic lesion shows maximum SUV of 7.8, previously 6.9.  T10 vertebral body mixed lytic and sclerotic lesion with an SUV of 7.1, previously not hypermetabolic. Thoracic vertebral bodies 8, 7, 5, and 4 also show hypermetabolic lesions were previously there was no hypermetabolism.  Some of the sclerotic osseous lesions are unchanged and do not show increased hypermetabolism compatible treated lesion such as a severe compression deformity at L1 as well as superior compression deformity at L4.  Medial right clavicle sclerotic lesion with SUV max of 4.8, previously not hypermetabolic. There is also right-sided sternal lesions.      Biopsy from the right acetabulum shows metastatic lobular carcinoma.  It is triple negative.  Androgen receptor was diffusely positive (90%, moderate intensity) by immunohistochemistry. )  PD-L1 negative.    Foundation one showed CDH1 mutation (initially lobular cancer), CCND1 amplification ( equivocal ). FGF3, FGF4, FGF19 amplification ( Equivocal ). Most promising is CCND1 amplification with abemaciclib showing  response in 4/10 patients. FGF3,FGF4 and FGF19 are targetable with pan-FGFR inhibitors.           ASSESSMENT AND PLAN:     Metastatic breast cancer negative breast cancer (androgen receptor positive ) with extensive involvement of the bones.  There is also a left lower lobe hypermetabolic lesion and mediastinal lymph nodes which are hypermetabolic.  The biopsy from the right iliac bone is consistent with metastatic lobular breast cancer but it is hormone receptor and HER-2 negative and PDL 1 is also negative.    Foundation 1 testing did not reveal any actionable mutations.    She was intolerant to Xeloda and progressed on Taxol and eribulin.      We then switched to recently FDA approved sacituzumab govitecan-hziy (Trodelvy) for TNBC, based on the results of the phase II IMMU-132-01 clinical trial.   She started this on 11/11/2020.      She obtained a second opinion from Dr. Castillo from [facility] who recommended a repeat biopsy to be done to make sure that she really has triple negative breast cancer.      She also met with Dr. Arelis Arshad on 3/18/2021.      After 10 cycles of Trodelvy, she had PET/CT on 6/11/2021 which showed mildly increased uptake in some of the bone lesions while stability in other bone lesions.        After 15 cycles, repeat PET scan showed stable findings.  CA 27-29 has been increasing and it is 1176.      As she was tolerating the chemotherapy well and her quality of life has been decent and scans were stable although she had a rising CA 27-29, we decided on continuing the same chemotherapy because it has been a very slow progression of the disease.    Now there is clear progression of the disease in the bones.  There is increased FDG uptake in both right and left iliac bones and the sternum.    Foundation one showed CDH1 mutation (initially lobular cancer), CCND1 amplification ( equivocal ). FGF3, FGF4, FGF19 amplification ( Equivocal ). Most promising is CCND1 amplification  with abemaciclib showing response in 4/10 patients. FGF3,FGF4 and FGF19 are targetable with pan-FGFR inhibitor    As the tumor is diffusely positive for androgen receptor, we decided to start her on androgen receptor blockers.    I initially recommended enzalutamide 160 mg daily ( Enzalutamide for the Treatment of Androgen Receptor-Expressing Triple-Negative Breast Cancer----J Clin Oncol. 2018 Mar 20; 36(9): 884-890. ).    Eventually we decided to start her on Casodex 150 mg daily instead of Enzalutamide due to cost issues.  Clinical Trial Clin Cancer Res. 2013 Oct 1;19(19):5505-12.   Phase II trial of bicalutamide in patients with androgen receptor-positive, estrogen receptor-negative metastatic Breast Cancer  She started Casodex on 1/6/2022.    She had progressive disease in the bones on the PET scan on 3/31/2022.    We changed to single agent Doxil on 4/14/2022.      She developed significant nausea vomiting and headache so cycle #2 was given on 5/13/2022 with 20% dose reduction which she tolerated well.    Cycle #3 was given on 6/10/2022 with the same dose reduced Doxil.    Repeat PET/CT on 7/1/2022 shows resolution of the FDG avid bony metastasis consistent with treated disease.  No suspicious FDG uptake was seen in the chest abdomen and pelvis.  CA 27-29 was also coming down.    As she was tolerating chemotherapy well.  She has been continued on Doxil.  She has received 7 cycles up until now.  Repeat PET/CT continues to show normal new FDG uptake.  CA 15-3 continues to trend down.    She continues to tolerate chemotherapy very well.  We will follow labs from today and if they are adequate then she will get cycle #8 chemotherapy.    Baseline echocardiogram on 4/7/2022 was unremarkable with EF 60 to 65%.    Bone disease.  She has metastatic disease to the bone.  Previously she got palliative radiation to T12-L5 3000 cGy in 10 fractions from 9/6/2019 till 9/18/2019.  She was evaluated by orthopedics   Bipin Elliott on 11/1/2019 and conservative management was recommended.    She was on Zometa every 3 months. She last received on 9/29/2020.  Because of progression of the disease in the bones, we switched to Xgeva which she received on 11/11/2020.    She had progression of the disease in the form so we switched to Doxil but we continued xgeva.  PET scan on 7/1/2022 does not show any suspicious FDG uptake in the bones consistent with treated disease.  Repeat PET/CT on 10/18/2022 also does not show any FDG avid lesions in the skeleton.  She will continue with vitamin D calcium and Xgeva.        Leukopenia/neutropenia/lymphopenia.  WBC count is low but WBC differential is pending.  Previously she had a lymphopenia but neutrophil count was normal.  We will follow the WBC differential today and see if we can give her chemotherapy.  Otherwise we will delayed by 1 week.       Pain management.  Doing very well on buprenorphine buccal film.  She takes morphine only occasionally.  Following with palliative care.      Migraines.   These have significantly improved.  She takes Aimovig monthly and takes naratriptan for breakthrough pain.  Follow with neurology.        Nausea.  Continue as needed antiemetics.      Bilateral pulmonary emboli.  Continue Eliquis      We did not address the following today.      Shingles.  The rash has dried out.  She completed well with acyclovir and prednisone.  She takes gabapentin for postherpetic neuralgia and is doing better.        Constipation. Continue senna.    Neuropathy.  This remains mild and stable with neuropathy in her hands.    This is in addition to the chronic balance issues and heat and cold sensitivity from underlying multiple sclerosis which is also stable for years.  Continue to monitor.     Subcutaneous nodule in the scalp.  This looks like an epidermoid cyst.  She thinks it is a stable.  Continue to monitor.       Insomnia.  Continue trazodone.      Wall thickening of  esophagus and FDG uptake of fundus of the stomach.  It could be in the setting of inflammation from recent nausea and vomiting.  Symptoms have resolved.  Continue to monitor clinically.    Vision changes.    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  It was thought that this could be due to Taxol as patient reported to the ophthalmologist that she was on Taxol in September 2019 when her symptoms started.  But she was not on Taxol in September 2019 when she started noticing the visual changes so Taxol is not responsible for this.  The first dose of Taxol was on 11/5/2019.   She had left cataract extraction on 2/8/2021 and she has noticed significant improvement in her vision.       Increased FDG ability in the colon.  She had FDG uptake in the cecum and ascending colon but no corresponding lesion was seen on the CT scan.  She is completely asymptomatic so at this time we will continue to observe.    Discussion regarding healthcare directives.  On previous visit she told me that she will bring the health care directive for our records but she forgot so I told her to bring it on next visit.      Breast implant removal.  She tells me that she was planning to do breast implant removal because there was a recall on this.  Her surgery was scheduled for 9/24/2019.  Because of this new development of metastatic breast cancer and her recent radiation, surgery has been canceled.  We discussed that at this time treatment for metastatic breast cancer would take precedence over the other surgery as the other surgery would require her to be off chemotherapy for several weeks and in that case the chances of cancer progression would be high and I would not recommend that.    Multiple sclerosis.  She will continue to follow with her neurologist Dr. Quiles.  Currently multiple sclerosis is under control and currently she is not taking any medications.    See nurse practitioner in 4 weeks and  see me in 8 weeks.      All of her questions were answered to her satisfaction.  She is agreeable and comfortable with the plan.    Dewayne Zepeda MD                Again, thank you for allowing me to participate in the care of your patient.        Sincerely,        Dewayne Zepeda MD

## 2022-10-27 DIAGNOSIS — C50.912 BILATERAL MALIGNANT NEOPLASM OF BREAST IN FEMALE, UNSPECIFIED ESTROGEN RECEPTOR STATUS, UNSPECIFIED SITE OF BREAST (H): ICD-10-CM

## 2022-10-27 DIAGNOSIS — E87.6 HYPOKALEMIA: ICD-10-CM

## 2022-10-27 DIAGNOSIS — D70.1 CHEMOTHERAPY-INDUCED NEUTROPENIA (H): Primary | ICD-10-CM

## 2022-10-27 DIAGNOSIS — C50.919 METASTATIC BREAST CANCER: ICD-10-CM

## 2022-10-27 DIAGNOSIS — T45.1X5A CHEMOTHERAPY-INDUCED NEUTROPENIA (H): Primary | ICD-10-CM

## 2022-10-27 DIAGNOSIS — C50.911 BILATERAL MALIGNANT NEOPLASM OF BREAST IN FEMALE, UNSPECIFIED ESTROGEN RECEPTOR STATUS, UNSPECIFIED SITE OF BREAST (H): ICD-10-CM

## 2022-10-27 RX ORDER — ALBUTEROL SULFATE 0.83 MG/ML
2.5 SOLUTION RESPIRATORY (INHALATION)
Status: CANCELLED | OUTPATIENT
Start: 2022-11-02

## 2022-10-27 RX ORDER — HEPARIN SODIUM,PORCINE 10 UNIT/ML
5 VIAL (ML) INTRAVENOUS
Status: CANCELLED | OUTPATIENT
Start: 2022-11-02

## 2022-10-27 RX ORDER — NALOXONE HYDROCHLORIDE 0.4 MG/ML
0.2 INJECTION, SOLUTION INTRAMUSCULAR; INTRAVENOUS; SUBCUTANEOUS
Status: CANCELLED | OUTPATIENT
Start: 2022-11-02

## 2022-10-27 RX ORDER — EPINEPHRINE 1 MG/ML
0.3 INJECTION, SOLUTION INTRAMUSCULAR; SUBCUTANEOUS EVERY 5 MIN PRN
Status: CANCELLED | OUTPATIENT
Start: 2022-11-02

## 2022-10-27 RX ORDER — MEPERIDINE HYDROCHLORIDE 25 MG/ML
25 INJECTION INTRAMUSCULAR; INTRAVENOUS; SUBCUTANEOUS EVERY 30 MIN PRN
Status: CANCELLED | OUTPATIENT
Start: 2022-11-02

## 2022-10-27 RX ORDER — METHYLPREDNISOLONE SODIUM SUCCINATE 125 MG/2ML
125 INJECTION, POWDER, LYOPHILIZED, FOR SOLUTION INTRAMUSCULAR; INTRAVENOUS
Status: CANCELLED
Start: 2022-11-02

## 2022-10-27 RX ORDER — DIPHENHYDRAMINE HYDROCHLORIDE 50 MG/ML
50 INJECTION INTRAMUSCULAR; INTRAVENOUS
Status: CANCELLED
Start: 2022-11-02

## 2022-10-27 RX ORDER — HEPARIN SODIUM (PORCINE) LOCK FLUSH IV SOLN 100 UNIT/ML 100 UNIT/ML
5 SOLUTION INTRAVENOUS
Status: CANCELLED | OUTPATIENT
Start: 2022-11-02

## 2022-10-27 RX ORDER — ALBUTEROL SULFATE 90 UG/1
1-2 AEROSOL, METERED RESPIRATORY (INHALATION)
Status: CANCELLED
Start: 2022-11-02

## 2022-11-02 ENCOUNTER — INFUSION THERAPY VISIT (OUTPATIENT)
Dept: INFUSION THERAPY | Facility: CLINIC | Age: 62
End: 2022-11-02
Payer: MEDICARE

## 2022-11-02 ENCOUNTER — LAB (OUTPATIENT)
Dept: ONCOLOGY | Facility: CLINIC | Age: 62
End: 2022-11-02
Payer: MEDICARE

## 2022-11-02 VITALS
TEMPERATURE: 98 F | RESPIRATION RATE: 16 BRPM | BODY MASS INDEX: 25.23 KG/M2 | SYSTOLIC BLOOD PRESSURE: 120 MMHG | DIASTOLIC BLOOD PRESSURE: 83 MMHG | WEIGHT: 138 LBS | HEART RATE: 74 BPM | OXYGEN SATURATION: 98 %

## 2022-11-02 DIAGNOSIS — C50.911 BILATERAL MALIGNANT NEOPLASM OF BREAST IN FEMALE, UNSPECIFIED ESTROGEN RECEPTOR STATUS, UNSPECIFIED SITE OF BREAST (H): ICD-10-CM

## 2022-11-02 DIAGNOSIS — C50.919 METASTATIC BREAST CANCER: ICD-10-CM

## 2022-11-02 DIAGNOSIS — T45.1X5A CHEMOTHERAPY-INDUCED NEUTROPENIA (H): ICD-10-CM

## 2022-11-02 DIAGNOSIS — C79.51 BONE METASTASES: Primary | ICD-10-CM

## 2022-11-02 DIAGNOSIS — D70.1 CHEMOTHERAPY-INDUCED NEUTROPENIA (H): ICD-10-CM

## 2022-11-02 DIAGNOSIS — C50.912 BILATERAL MALIGNANT NEOPLASM OF BREAST IN FEMALE, UNSPECIFIED ESTROGEN RECEPTOR STATUS, UNSPECIFIED SITE OF BREAST (H): ICD-10-CM

## 2022-11-02 DIAGNOSIS — E87.6 HYPOKALEMIA: Primary | ICD-10-CM

## 2022-11-02 DIAGNOSIS — E87.6 HYPOKALEMIA: ICD-10-CM

## 2022-11-02 LAB
ALBUMIN SERPL-MCNC: 3.4 G/DL (ref 3.4–5)
ALP SERPL-CCNC: 65 U/L (ref 40–150)
ALT SERPL W P-5'-P-CCNC: 44 U/L (ref 0–50)
ANION GAP SERPL CALCULATED.3IONS-SCNC: 6 MMOL/L (ref 3–14)
AST SERPL W P-5'-P-CCNC: 38 U/L (ref 0–45)
BASOPHILS # BLD AUTO: 0 10E3/UL (ref 0–0.2)
BASOPHILS NFR BLD AUTO: 1 %
BILIRUB SERPL-MCNC: 0.3 MG/DL (ref 0.2–1.3)
BUN SERPL-MCNC: 11 MG/DL (ref 7–30)
CALCIUM SERPL-MCNC: 8.7 MG/DL (ref 8.5–10.1)
CHLORIDE BLD-SCNC: 103 MMOL/L (ref 94–109)
CO2 SERPL-SCNC: 28 MMOL/L (ref 20–32)
CREAT SERPL-MCNC: 0.7 MG/DL (ref 0.52–1.04)
ELLIPTOCYTES BLD QL SMEAR: SLIGHT
EOSINOPHIL # BLD AUTO: 0.1 10E3/UL (ref 0–0.7)
EOSINOPHIL NFR BLD AUTO: 2 %
ERYTHROCYTE [DISTWIDTH] IN BLOOD BY AUTOMATED COUNT: 14.8 % (ref 10–15)
GFR SERPL CREATININE-BSD FRML MDRD: >90 ML/MIN/1.73M2
GLUCOSE BLD-MCNC: 102 MG/DL (ref 70–99)
HCT VFR BLD AUTO: 39.1 % (ref 35–47)
HGB BLD-MCNC: 12.9 G/DL (ref 11.7–15.7)
IMM GRANULOCYTES # BLD: 0 10E3/UL
IMM GRANULOCYTES NFR BLD: 1 %
LYMPHOCYTES # BLD AUTO: 0.6 10E3/UL (ref 0.8–5.3)
LYMPHOCYTES NFR BLD AUTO: 19 %
MCH RBC QN AUTO: 28.9 PG (ref 26.5–33)
MCHC RBC AUTO-ENTMCNC: 33 G/DL (ref 31.5–36.5)
MCV RBC AUTO: 88 FL (ref 78–100)
MONOCYTES # BLD AUTO: 0.6 10E3/UL (ref 0–1.3)
MONOCYTES NFR BLD AUTO: 22 %
NEUTROPHILS # BLD AUTO: 1.7 10E3/UL (ref 1.6–8.3)
NEUTROPHILS NFR BLD AUTO: 55 %
NRBC # BLD AUTO: 0 10E3/UL
NRBC BLD AUTO-RTO: 0 /100
PLAT MORPH BLD: ABNORMAL
PLATELET # BLD AUTO: 185 10E3/UL (ref 150–450)
POTASSIUM BLD-SCNC: 4 MMOL/L (ref 3.4–5.3)
PROT SERPL-MCNC: 6.9 G/DL (ref 6.8–8.8)
RBC # BLD AUTO: 4.46 10E6/UL (ref 3.8–5.2)
RBC MORPH BLD: ABNORMAL
SODIUM SERPL-SCNC: 137 MMOL/L (ref 133–144)
WBC # BLD AUTO: 3 10E3/UL (ref 4–11)

## 2022-11-02 PROCEDURE — 99207 PR NO CHARGE LOS: CPT

## 2022-11-02 PROCEDURE — 96367 TX/PROPH/DG ADDL SEQ IV INF: CPT | Performed by: NURSE PRACTITIONER

## 2022-11-02 PROCEDURE — 85025 COMPLETE CBC W/AUTO DIFF WBC: CPT | Performed by: INTERNAL MEDICINE

## 2022-11-02 PROCEDURE — 96413 CHEMO IV INFUSION 1 HR: CPT | Performed by: NURSE PRACTITIONER

## 2022-11-02 PROCEDURE — 96372 THER/PROPH/DIAG INJ SC/IM: CPT | Mod: 59 | Performed by: NURSE PRACTITIONER

## 2022-11-02 PROCEDURE — 80053 COMPREHEN METABOLIC PANEL: CPT | Performed by: INTERNAL MEDICINE

## 2022-11-02 RX ORDER — HEPARIN SODIUM (PORCINE) LOCK FLUSH IV SOLN 100 UNIT/ML 100 UNIT/ML
5 SOLUTION INTRAVENOUS
Status: DISCONTINUED | OUTPATIENT
Start: 2022-11-02 | End: 2022-11-02 | Stop reason: HOSPADM

## 2022-11-02 RX ADMIN — HEPARIN SODIUM (PORCINE) LOCK FLUSH IV SOLN 100 UNIT/ML 5 ML: 100 SOLUTION at 09:12

## 2022-11-02 RX ADMIN — HEPARIN SODIUM (PORCINE) LOCK FLUSH IV SOLN 100 UNIT/ML 5 ML: 100 SOLUTION at 14:15

## 2022-11-02 NOTE — PROGRESS NOTES
Infusion Nursing Note:  Shaneka Alvarez presents today for C8 Doxil + Xgeva.    Patient seen by provider today: No   present during visit today: Not Applicable.    Note: n/a    Intravenous Access:  Implanted Port.    Treatment Conditions:  Lab Results   Component Value Date    HGB 12.9 11/02/2022    WBC 3.0 (L) 11/02/2022    ANEU 1.9 10/26/2021    ANEUTAUTO 1.7 11/02/2022     11/02/2022      Lab Results   Component Value Date     11/02/2022    POTASSIUM 4.0 11/02/2022    MAG 2.0 04/21/2022    CR 0.70 11/02/2022    RAFAELA 8.7 11/02/2022    BILITOTAL 0.3 11/02/2022    ALBUMIN 3.4 11/02/2022    ALT 44 11/02/2022    AST 38 11/02/2022     Results reviewed, labs MET treatment parameters, ok to proceed with treatment.    Post Infusion Assessment:  Patient tolerated infusion without incident.  Blood return noted pre and post infusion.  Site patent and intact, free from redness, edema or discomfort.  No evidence of extravasations.  Access discontinued per protocol.     Discharge Plan:   Discharge instructions reviewed with: Patient.  Patient and/or family verbalized understanding of discharge instructions and all questions answered.  Patient discharged in stable condition accompanied by: self.  Departure Mode: Ambulatory.      Yanci Kate RN

## 2022-11-02 NOTE — PROGRESS NOTES
Port site scrubbed with Chloraprep for 30 seconds. Accessed port using sterile technique.  See documentation flowsheet. Tolerated port access and draw without complaint.     Suellen Valdez RN

## 2022-11-04 ENCOUNTER — MYC MEDICAL ADVICE (OUTPATIENT)
Dept: FAMILY MEDICINE | Facility: OTHER | Age: 62
End: 2022-11-04

## 2022-11-04 DIAGNOSIS — F33.1 MAJOR DEPRESSIVE DISORDER, RECURRENT EPISODE, MODERATE (H): ICD-10-CM

## 2022-11-04 DIAGNOSIS — F41.9 ANXIETY: ICD-10-CM

## 2022-11-04 RX ORDER — VENLAFAXINE HYDROCHLORIDE 75 MG/1
225 CAPSULE, EXTENDED RELEASE ORAL DAILY
Qty: 90 CAPSULE | Refills: 0 | Status: SHIPPED | OUTPATIENT
Start: 2022-11-04 | End: 2022-11-18

## 2022-11-05 DIAGNOSIS — I26.99 ACUTE PULMONARY EMBOLISM WITHOUT ACUTE COR PULMONALE, UNSPECIFIED PULMONARY EMBOLISM TYPE (H): ICD-10-CM

## 2022-11-07 RX ORDER — APIXABAN 5 MG/1
TABLET, FILM COATED ORAL
Qty: 60 TABLET | Refills: 1 | Status: SHIPPED | OUTPATIENT
Start: 2022-11-07 | End: 2023-01-23

## 2022-11-14 NOTE — TELEPHONE ENCOUNTER
ED / Discharge Outreach Protocol    Patient Contact    Attempt # 1    Was call answered?  No.  Left message on voicemail with information to call me back.    Kathy Farias, RN, BSN       Emesis on and off for the last three week. ABD pain onset yesterday.

## 2022-11-18 ENCOUNTER — OFFICE VISIT (OUTPATIENT)
Dept: FAMILY MEDICINE | Facility: OTHER | Age: 62
End: 2022-11-18
Payer: MEDICARE

## 2022-11-18 VITALS
OXYGEN SATURATION: 100 % | HEIGHT: 62 IN | BODY MASS INDEX: 23.74 KG/M2 | RESPIRATION RATE: 12 BRPM | WEIGHT: 129 LBS | HEART RATE: 67 BPM | DIASTOLIC BLOOD PRESSURE: 90 MMHG | SYSTOLIC BLOOD PRESSURE: 116 MMHG | TEMPERATURE: 98.5 F

## 2022-11-18 DIAGNOSIS — G89.3 CANCER ASSOCIATED PAIN: ICD-10-CM

## 2022-11-18 DIAGNOSIS — G43.009 MIGRAINE WITHOUT AURA AND WITHOUT STATUS MIGRAINOSUS, NOT INTRACTABLE: ICD-10-CM

## 2022-11-18 DIAGNOSIS — G35 MULTIPLE SCLEROSIS (H): ICD-10-CM

## 2022-11-18 DIAGNOSIS — G37.9 DEMYELINATING DISORDER (H): ICD-10-CM

## 2022-11-18 DIAGNOSIS — Z12.4 CERVICAL CANCER SCREENING: ICD-10-CM

## 2022-11-18 DIAGNOSIS — F41.9 ANXIETY: Primary | ICD-10-CM

## 2022-11-18 DIAGNOSIS — F33.1 MAJOR DEPRESSIVE DISORDER, RECURRENT EPISODE, MODERATE (H): ICD-10-CM

## 2022-11-18 PROCEDURE — 99214 OFFICE O/P EST MOD 30 MIN: CPT | Performed by: PHYSICIAN ASSISTANT

## 2022-11-18 RX ORDER — PROPRANOLOL HYDROCHLORIDE 80 MG/1
CAPSULE, EXTENDED RELEASE ORAL
Qty: 90 CAPSULE | Refills: 3 | Status: SHIPPED | OUTPATIENT
Start: 2022-11-18 | End: 2023-03-27

## 2022-11-18 RX ORDER — BUSPIRONE HYDROCHLORIDE 15 MG/1
15 TABLET ORAL 2 TIMES DAILY
Qty: 180 TABLET | Refills: 3 | Status: SHIPPED | OUTPATIENT
Start: 2022-11-18 | End: 2023-04-04

## 2022-11-18 RX ORDER — VENLAFAXINE HYDROCHLORIDE 75 MG/1
225 CAPSULE, EXTENDED RELEASE ORAL DAILY
Qty: 270 CAPSULE | Refills: 3 | Status: SHIPPED | OUTPATIENT
Start: 2022-11-18 | End: 2023-01-01

## 2022-11-18 ASSESSMENT — PATIENT HEALTH QUESTIONNAIRE - PHQ9
SUM OF ALL RESPONSES TO PHQ QUESTIONS 1-9: 5
SUM OF ALL RESPONSES TO PHQ QUESTIONS 1-9: 5
10. IF YOU CHECKED OFF ANY PROBLEMS, HOW DIFFICULT HAVE THESE PROBLEMS MADE IT FOR YOU TO DO YOUR WORK, TAKE CARE OF THINGS AT HOME, OR GET ALONG WITH OTHER PEOPLE: NOT DIFFICULT AT ALL

## 2022-11-18 ASSESSMENT — PAIN SCALES - GENERAL: PAINLEVEL: SEVERE PAIN (6)

## 2022-11-18 NOTE — PROGRESS NOTES
Assessment & Plan     Anxiety  Overall she is doing very very well.  Follow-up in 6 months is advised.  - venlafaxine (EFFEXOR XR) 75 MG 24 hr capsule; Take 3 capsules (225 mg) by mouth daily  - busPIRone (BUSPAR) 15 MG tablet; Take 1 tablet (15 mg) by mouth 2 times daily    Major depressive disorder, recurrent episode, moderate (H)  No new concerns with this.  Follow-up in 6 months.  - venlafaxine (EFFEXOR XR) 75 MG 24 hr capsule; Take 3 capsules (225 mg) by mouth daily    Migraine without aura and without status migrainosus, not intractable  Migraines are not going well for her.  She sees a specialist [facility].  With her current medication regimen that she is on I recommend that she reach out to that specialist and discuss this further/be seen.  - propranolol ER (INDERAL LA) 80 MG 24 hr capsule; Take 1 capsule by mouth once daily in the morning    Cancer associated pain  Demyelinating disorder (H)  Multiple sclerosis (H)  Overall she states that she is doing very well with respect to this and is very pleased with her current status of her multiple sclerosis.  Follow-up as needed.    Cervical cancer screening  Advised that she is overdue for her full female physical.  She declines 1 today.  Advised that she arrange for this with primary care provider of her choosing in the very near future.    Patient is due for some vaccines but we find out that she only has Medicare for herself and these might be prohibitively costly.  She is getting new insurance in the beginning of the year and will return at that point time for further evaluation.  At this point in time I do recommend that she consider the Prevnar 20, TDA P and Shingrix vaccines as soon as possible.       Return for Physical Exam.    Spenser Thomas PA-C  [facility]    Ajit Munroe is a 62 year old, presenting for the following health issues:  Recheck Medication      History of Present Illness       Reason for visit:   "See primary and renew prescriptions    She eats 2-3 servings of fruits and vegetables daily.She consumes 0 sweetened beverage(s) daily.She exercises with enough effort to increase her heart rate 20 to 29 minutes per day.  She exercises with enough effort to increase her heart rate 5 days per week.   She is taking medications regularly.    Today's PHQ-9         PHQ-9 Total Score: 5    PHQ-9 Q9 Thoughts of better off dead/self-harm past 2 weeks :   Not at all    How difficult have these problems made it for you to do your work, take care of things at home, or get along with other people: Not difficult at all       Migraine     Since your last clinic visit, how have your headaches changed?  Worsened    How often are you getting headaches or migraines? 2 times per week     Are you able to do normal daily activities when you have a migraine? No    Are you taking rescue/relief medications? (Select all that apply) Other: Sumatriptan    How helpful is your rescue/relief medication?  I get some relief    Are you taking any medications to prevent migraines? (Select all that apply)  Other: Aimovig    In the past 4 weeks, how often have you gone to urgent care or the emergency room because of your headaches?  0        Review of Systems   Constitutional, HEENT, cardiovascular, pulmonary, GI, , musculoskeletal, neuro, skin, endocrine and psych systems are negative, except as otherwise noted.      Objective    BP (!) 116/90   Pulse 67   Temp 98.5  F (36.9  C) (Temporal)   Resp 12   Ht 1.565 m (5' 1.61\")   Wt 58.5 kg (129 lb)   SpO2 100%   BMI 23.89 kg/m    Body mass index is 23.89 kg/m .  Physical Exam   GENERAL: healthy, alert and no distress  NECK: no adenopathy, no asymmetry, masses, or scars and thyroid normal to palpation  RESP: lungs clear to auscultation - no rales, rhonchi or wheezes  CV: regular rate and rhythm, normal S1 S2, no S3 or S4, no murmur, click or rub, no peripheral edema and peripheral pulses " strong  ABDOMEN: soft, nontender, no hepatosplenomegaly, no masses and bowel sounds normal  MS: no gross musculoskeletal defects noted, no edema  SKIN: no suspicious lesions or rashes to exposed visible skin today.  NEURO: Normal strength and tone, mentation intact and speech normal  PSYCH: mentation appears normal, affect normal/bright    No results found. However, due to the size of the patient record, not all encounters were searched. Please check Results Review for a complete set of results.

## 2022-11-25 ENCOUNTER — TELEPHONE (OUTPATIENT)
Dept: NEUROLOGY | Facility: CLINIC | Age: 62
End: 2022-11-25

## 2022-11-25 NOTE — TELEPHONE ENCOUNTER
"Prior Authorization Retail Medication Request     Medication/Dose: Ubrelvy 50 mg  ICD code (if different than what is on RX):  Chronic migraine  Previously Tried and Failed:   DHE and toradol about 2-3 times per week and partially effective  Topiramate 200 mg BID and no side effects but word findings difficulties   for a while and was not effective and made patient \"forgetful\"  Protriptyline (Vivactil) outside provider and was not effective  Sumatriptan nasal and injections and all of the triptans (Maxalt, Relpax) were not effective  Botox injections    Was considered for Tysabri and 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  Venlafaxine helpful for anxiety/depression but not headaches  Naproxen as needed   Propranolol 60 mg ER -unsure of the effectiveness  Buspar for anxiety and headaches  Gabapentin-caused \"stomach sickness\"  Chlorpromazine -for nausea and has been helpful  Tizanidine -\"does not remember\"  bystolic  sumavel  Amitriptyline   Depakote  Verapamil  Petadolax 75 mg twice daily and was not effective  Physical therapy in the past and unsure of the effectiveness  Have not tried Cefaly  Aimovig  for a few month and stopped 3 years ago     Rationale:  Chronic migraine     Insurance Name:  Medicare  Insurance ID:  [id number]         Pharmacy Information (if different than what is on RX)  Name:    Phone:  "

## 2022-11-28 DIAGNOSIS — G43.009 MIGRAINE WITHOUT AURA AND WITHOUT STATUS MIGRAINOSUS, NOT INTRACTABLE: ICD-10-CM

## 2022-11-28 RX ORDER — NARATRIPTAN 2.5 MG/1
2.5 TABLET ORAL
Qty: 18 TABLET | Refills: 6 | Status: SHIPPED | OUTPATIENT
Start: 2022-11-28 | End: 2023-01-01

## 2022-11-28 NOTE — TELEPHONE ENCOUNTER
PA request was received from [facility]. There is no current Rx in chart for this medication. Routing back to clinic to add Rx if appropriate.

## 2022-11-29 NOTE — PROGRESS NOTES
Oncology Follow Up Visit: November 30, 2022     Oncologist: Dr Dewayne Zepeda  PCP: Mohit Barcenas    Diagnosis: Metastatic Breast Cancer to brain and spine  Shaneka Alvarez is a 63 yo female who was diagnosed with Stage IIB, T2 N1 M0 invasive lobular carcinoma of the right breast  In January 2006.  Final pathology showed a 4.5 x 3.8 x 2.5 cm, 01/14 lymph nodes positive.  Estrogen, progesterone receptor positive, HER-2 negative.     Genetics- BRCA1 and 2 mutations not detected. 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  There was no evidence of new multiple sclerosis lesions.  PET scan on 8/30/2019 which showed widespread bone metastatic lesions (calvarium, spine, sacrum, pelvis, ribs most prominent at C7, T3, L1 and L4), hypermetabolic 3 cm lesion in LLL, and mediastinal/hilar LN. CEA wnl at 1.9 and C27-29 elevated to 415 (28 on 8/23/16). A CT guided biopsy of the right iliac bone was obtained on 9/4/19, pathology consistent with metastatic adenocarcinoma (breast), ER/FL negative, HER-2 negative PDL 1 < 1%. A second biopsy was take of the LLL nodule via EBUS on 9/5/19 did not show any malignancy.  C/T/L spine MRI 8/3/19 to 9/2/19 with numerous enhancing lesions of the C, T and L spine, largest around T9 and L1. No evidence of cord compression. Has a L1 compression fracture and impending L4. completed radiation therapy to T12-L5.-Neurosurgery recommended no surgical intervention but to wear Gorge brace when out of bed and HOB >30 degrees  Treatment:   2006:  ECOG 2104 protocol of dose-dense Adriamycin, Cytoxan and Avastin x4 cycles followed by Taxol and Avastin x4 cycles.   03/2007:  Completed 1 year Avastin.   11/2006-01/2007:  Radiotherapy to the right breast of 5040 cGy.   03/20071900-6046:  Aromasin.  Stopped after moving to the [email or web address].   01/2016:  Right  modified radical mastectomy with latissimus dorsi flap reconstruction and a left prophylactic mastectomy with latissimus dorsi flap reconstruction.   9/6/2019 till 9/18/2019 Received radiation T12-L5 3000 cGy in 10 fractions   -Neurosurgery recommended no surgical intervention but to wear Gorge brace when out of bed and HOB >30 degrees  9/18/2019 completed T12-L5 radiation in 10 fractions =3000 cGy  10/1/2019 Xeloda x 2 trials lowering dose to 1000 mg bid  10/23/2019 Began every 3 month Zometa  11/5/2019- 1/14/2020  weekly paclitaxel x 3 cycles  1/28-10/2020 Eribulin  11/11/2020- 12/2021 Trodelvy  Repeat biopsy done from the right acetabulum on 2/25/2021 which showed metastatic lobular breast cancer.  Hormonal studies, HER-2 FISH again showed that it is triple negative.  Androgen receptor is diffusely positive.    PD-L1 negative.  Foundation 1 testing did not reveal any actionable mutations  1-3/2022  bicalutamide with bony progression  4/2022 began liposomal doxorubicin     Interval History: Ms. Alvarez comes to clinic for review prior to cycle 9 of Doxil. Pt reporting new left anterior chest pain, just under implant- began 2 or so weeks ago - no trauma or strenuous activity- has tried heat and cold and is now back to using pain pills for this rather than her usual back pain. She also share her appetite is not good but does make herself use protein drinks by day then eats a good supper- weight is moving around a lot. Feels she is good with taking fluids. Denies fevers, illness, SOB or cough. Bowel and bladder are normal.  Walks dog several times each day.  Rest of comprehensive and complete ROS is reviewed and is negative.   Past Medical History:   Diagnosis Date     Breast cancer (H)     Age 44     Cataract     left eye     Chemotherapy induced nausea and vomiting 12/15/2020     Common migraine      Esophageal reflux      H/O bilateral mastectomy      Mild major depression (H)      Multiple sclerosis (H)       "Pulmonary embolism (H)      Current Outpatient Medications   Medication     acetaminophen (TYLENOL) 500 MG tablet     Buprenorphine HCl (BELBUCA) 150 MCG FILM buccal film     busPIRone (BUSPAR) 15 MG tablet     calcium citrate-vitamin D (CITRACAL) 315-250 MG-UNIT TABS     Cholecalciferol (VITAMIN D3 PO)     ELIQUIS ANTICOAGULANT 5 MG tablet     erenumab-aooe (AIMOVIG) 70 MG/ML injection     ibuprofen (ADVIL/MOTRIN) 600 MG tablet     LORazepam (ATIVAN) 1 MG tablet     morphine (MSIR) 15 MG IR tablet     naratriptan (AMERGE) 2.5 MG tablet     propranolol ER (INDERAL LA) 80 MG 24 hr capsule     traZODone (DESYREL) 50 MG tablet     venlafaxine (EFFEXOR XR) 75 MG 24 hr capsule     vitamin B-2 (RIBOFLAVIN) 25 MG TABS tablet     No current facility-administered medications for this visit.     Facility-Administered Medications Ordered in Other Visits   Medication     denosumab (XGEVA) injection 120 mg     dextrose 5% BOLUS     DOXOrubicin liposome (DOXIL) 70 mg in D5W 310 mL infusion     fosaprepitant (EMEND) 150 mg, dexamethasone (DECADRON) 12 mg in sodium chloride 0.9 % 276.2 mL intermittent infusion     heparin 100 UNIT/ML injection 5 mL     sodium chloride (PF) 0.9% PF flush 3-20 mL     Allergies   Allergen Reactions     Bactrim [Sulfamethoxazole W/Trimethoprim]      Internal bleeding     Copaxone [Glatiramer] Hives       Physical Exam: /82 (BP Location: Left arm, Patient Position: Chair, Cuff Size: Adult Regular)   Pulse 68   Temp 97.6  F (36.4  C) (Oral)   Ht 1.565 m (5' 1.61\")   Wt 60.3 kg (133 lb)   SpO2 100%   BMI 24.63 kg/m     GENERAL: Healthy, alert and no distress  Constitutional: Alert, healthy, and in no distress.   ENT: Eyes grossly normal to inspection.  No discharge or erythema, or obvious scleral/conjunctival abnormalities. No mouth sores  Neck: Supple, No adenopathy.  Cardiac: Heart rate and rhythm is regular with normal S1 and S2 without murmur  Respiratory: Breathing easy. Lung sounds clear " to auscultation  Breasts: Pressure to left lateral chest and just above left breast does cause pain- no deformities noted  Abdomen: Soft, non-tender, BS normal. No masses or organomegaly  MS: Muscle tone normal. extremities normal with no edema.   Skin: No suspicious lesions or rashes  Neuro: Sensory grossly WNL, gait normal.   Lymph: Normal ant/post cervical, axillary, supraclavicular nodes  Psych: Mentation appears normal and affect normal/bright with good conversation.  Normal speech and appearance well-groomed.    Laboratory Results:   Results for orders placed or performed in visit on 11/30/22   Comprehensive metabolic panel     Status: Abnormal   Result Value Ref Range    Sodium 137 133 - 144 mmol/L    Potassium 4.1 3.4 - 5.3 mmol/L    Chloride 105 94 - 109 mmol/L    Carbon Dioxide (CO2) 26 20 - 32 mmol/L    Anion Gap 6 3 - 14 mmol/L    Urea Nitrogen 13 7 - 30 mg/dL    Creatinine 0.72 0.52 - 1.04 mg/dL    Calcium 8.7 8.5 - 10.1 mg/dL    Glucose 104 (H) 70 - 99 mg/dL    Alkaline Phosphatase 60 40 - 150 U/L    AST 22 0 - 45 U/L    ALT 26 0 - 50 U/L    Protein Total 6.4 (L) 6.8 - 8.8 g/dL    Albumin 3.3 (L) 3.4 - 5.0 g/dL    Bilirubin Total 0.5 0.2 - 1.3 mg/dL    GFR Estimate >90 >60 mL/min/1.73m2   CBC with platelets and differential     Status: Abnormal   Result Value Ref Range    WBC Count 2.6 (L) 4.0 - 11.0 10e3/uL    RBC Count 4.46 3.80 - 5.20 10e6/uL    Hemoglobin 12.7 11.7 - 15.7 g/dL    Hematocrit 39.0 35.0 - 47.0 %    MCV 87 78 - 100 fL    MCH 28.5 26.5 - 33.0 pg    MCHC 32.6 31.5 - 36.5 g/dL    RDW 15.3 (H) 10.0 - 15.0 %    Platelet Count 154 150 - 450 10e3/uL    % Neutrophils 53 %    % Lymphocytes 17 %    % Monocytes 26 %    % Eosinophils 2 %    % Basophils 1 %    % Immature Granulocytes 1 %    NRBCs per 100 WBC 0 <1 /100    Absolute Neutrophils 1.4 (L) 1.6 - 8.3 10e3/uL    Absolute Lymphocytes 0.4 (L) 0.8 - 5.3 10e3/uL    Absolute Monocytes 0.7 0.0 - 1.3 10e3/uL    Absolute Eosinophils 0.1 0.0 - 0.7  10e3/uL    Absolute Basophils 0.0 0.0 - 0.2 10e3/uL    Absolute Immature Granulocytes 0.0 <=0.4 10e3/uL    Absolute NRBCs 0.0 10e3/uL   RBC and Platelet Morphology     Status: None   Result Value Ref Range    Platelet Assessment  Automated Count Confirmed. Platelet morphology is normal.     Automated Count Confirmed. Platelet morphology is normal.    RBC Morphology Confirmed RBC Indices    CBC with platelets differential     Status: Abnormal    Narrative    The following orders were created for panel order CBC with platelets differential.  Procedure                               Abnormality         Status                     ---------                               -----------         ------                     CBC with platelets and d...[[id number]]  Abnormal            Final result               RBC and Platelet Morphology[[id number]]                      Final result                 Please view results for these tests on the individual orders.        Assessment and Plan:   Metastatic Breast Cancer to brain and spine-Pt continues with Doxil every 28 days and will be due for cycle 9 today- she is meeting goals with review, labs and exam today and will be receiving Xgeva as well.   No changes to plan.   -new left chest pain under implant- review shows painful rib cage with know history of bone mets though no active mets noted with last chest CT in October. Pt does have a very active dog. Will get rib xray to check on possible fracture. Pain to be covered with icing as able, tylenol or- if need be- 1 morphine tab. Will wait for results of xray.   - poor appetite- may be side effect of plan but pt working well to have protein drinks available and does take 1 good meal daily with no significant weight loss at this time.   The total time of this encounter amounted to 30 minutes. This time included face to face time spent with the patient, prep work, ordering tests, and performing post visit documentation.  Annie  Adamaris,Cnp

## 2022-11-30 ENCOUNTER — INFUSION THERAPY VISIT (OUTPATIENT)
Dept: INFUSION THERAPY | Facility: CLINIC | Age: 62
End: 2022-11-30
Payer: MEDICARE

## 2022-11-30 ENCOUNTER — LAB (OUTPATIENT)
Dept: ONCOLOGY | Facility: CLINIC | Age: 62
End: 2022-11-30
Payer: MEDICARE

## 2022-11-30 ENCOUNTER — ONCOLOGY VISIT (OUTPATIENT)
Dept: ONCOLOGY | Facility: CLINIC | Age: 62
End: 2022-11-30
Attending: NURSE PRACTITIONER
Payer: MEDICARE

## 2022-11-30 ENCOUNTER — ANCILLARY PROCEDURE (OUTPATIENT)
Dept: GENERAL RADIOLOGY | Facility: CLINIC | Age: 62
End: 2022-11-30
Attending: NURSE PRACTITIONER
Payer: MEDICARE

## 2022-11-30 VITALS
TEMPERATURE: 97.6 F | WEIGHT: 133 LBS | HEART RATE: 68 BPM | BODY MASS INDEX: 24.48 KG/M2 | DIASTOLIC BLOOD PRESSURE: 82 MMHG | SYSTOLIC BLOOD PRESSURE: 117 MMHG | OXYGEN SATURATION: 100 % | HEIGHT: 62 IN

## 2022-11-30 DIAGNOSIS — C50.919 METASTATIC BREAST CANCER: ICD-10-CM

## 2022-11-30 DIAGNOSIS — E87.6 HYPOKALEMIA: ICD-10-CM

## 2022-11-30 DIAGNOSIS — C79.51 BONE METASTASES: ICD-10-CM

## 2022-11-30 DIAGNOSIS — T45.1X5A CHEMOTHERAPY-INDUCED NEUTROPENIA (H): ICD-10-CM

## 2022-11-30 DIAGNOSIS — G47.00 INSOMNIA, UNSPECIFIED TYPE: ICD-10-CM

## 2022-11-30 DIAGNOSIS — C50.912 BILATERAL MALIGNANT NEOPLASM OF BREAST IN FEMALE, UNSPECIFIED ESTROGEN RECEPTOR STATUS, UNSPECIFIED SITE OF BREAST (H): ICD-10-CM

## 2022-11-30 DIAGNOSIS — R07.81 RIB PAIN ON LEFT SIDE: Primary | ICD-10-CM

## 2022-11-30 DIAGNOSIS — T45.1X5A ANEMIA ASSOCIATED WITH CHEMOTHERAPY: ICD-10-CM

## 2022-11-30 DIAGNOSIS — C50.911 BILATERAL MALIGNANT NEOPLASM OF BREAST IN FEMALE, UNSPECIFIED ESTROGEN RECEPTOR STATUS, UNSPECIFIED SITE OF BREAST (H): ICD-10-CM

## 2022-11-30 DIAGNOSIS — G89.3 CANCER ASSOCIATED PAIN: ICD-10-CM

## 2022-11-30 DIAGNOSIS — R11.2 CHEMOTHERAPY INDUCED NAUSEA AND VOMITING: ICD-10-CM

## 2022-11-30 DIAGNOSIS — R07.81 RIB PAIN ON LEFT SIDE: ICD-10-CM

## 2022-11-30 DIAGNOSIS — T45.1X5A CHEMOTHERAPY INDUCED NAUSEA AND VOMITING: ICD-10-CM

## 2022-11-30 DIAGNOSIS — C79.51 BONE METASTASES: Primary | ICD-10-CM

## 2022-11-30 DIAGNOSIS — D64.81 ANEMIA ASSOCIATED WITH CHEMOTHERAPY: ICD-10-CM

## 2022-11-30 DIAGNOSIS — D70.1 CHEMOTHERAPY-INDUCED NEUTROPENIA (H): ICD-10-CM

## 2022-11-30 DIAGNOSIS — C50.919 METASTATIC BREAST CANCER: Primary | ICD-10-CM

## 2022-11-30 LAB
ALBUMIN SERPL-MCNC: 3.3 G/DL (ref 3.4–5)
ALP SERPL-CCNC: 60 U/L (ref 40–150)
ALT SERPL W P-5'-P-CCNC: 26 U/L (ref 0–50)
ANION GAP SERPL CALCULATED.3IONS-SCNC: 6 MMOL/L (ref 3–14)
AST SERPL W P-5'-P-CCNC: 22 U/L (ref 0–45)
BASOPHILS # BLD AUTO: 0 10E3/UL (ref 0–0.2)
BASOPHILS NFR BLD AUTO: 1 %
BILIRUB SERPL-MCNC: 0.5 MG/DL (ref 0.2–1.3)
BUN SERPL-MCNC: 13 MG/DL (ref 7–30)
CALCIUM SERPL-MCNC: 8.7 MG/DL (ref 8.5–10.1)
CANCER AG15-3 SERPL-ACNC: 135 U/ML
CHLORIDE BLD-SCNC: 105 MMOL/L (ref 94–109)
CO2 SERPL-SCNC: 26 MMOL/L (ref 20–32)
CREAT SERPL-MCNC: 0.72 MG/DL (ref 0.52–1.04)
EOSINOPHIL # BLD AUTO: 0.1 10E3/UL (ref 0–0.7)
EOSINOPHIL NFR BLD AUTO: 2 %
ERYTHROCYTE [DISTWIDTH] IN BLOOD BY AUTOMATED COUNT: 15.3 % (ref 10–15)
GFR SERPL CREATININE-BSD FRML MDRD: >90 ML/MIN/1.73M2
GLUCOSE BLD-MCNC: 104 MG/DL (ref 70–99)
HCT VFR BLD AUTO: 39 % (ref 35–47)
HGB BLD-MCNC: 12.7 G/DL (ref 11.7–15.7)
IMM GRANULOCYTES # BLD: 0 10E3/UL
IMM GRANULOCYTES NFR BLD: 1 %
LYMPHOCYTES # BLD AUTO: 0.4 10E3/UL (ref 0.8–5.3)
LYMPHOCYTES NFR BLD AUTO: 17 %
MCH RBC QN AUTO: 28.5 PG (ref 26.5–33)
MCHC RBC AUTO-ENTMCNC: 32.6 G/DL (ref 31.5–36.5)
MCV RBC AUTO: 87 FL (ref 78–100)
MONOCYTES # BLD AUTO: 0.7 10E3/UL (ref 0–1.3)
MONOCYTES NFR BLD AUTO: 26 %
NEUTROPHILS # BLD AUTO: 1.4 10E3/UL (ref 1.6–8.3)
NEUTROPHILS NFR BLD AUTO: 53 %
NRBC # BLD AUTO: 0 10E3/UL
NRBC BLD AUTO-RTO: 0 /100
PLAT MORPH BLD: NORMAL
PLATELET # BLD AUTO: 154 10E3/UL (ref 150–450)
POTASSIUM BLD-SCNC: 4.1 MMOL/L (ref 3.4–5.3)
PROT SERPL-MCNC: 6.4 G/DL (ref 6.8–8.8)
RBC # BLD AUTO: 4.46 10E6/UL (ref 3.8–5.2)
RBC MORPH BLD: NORMAL
SODIUM SERPL-SCNC: 137 MMOL/L (ref 133–144)
WBC # BLD AUTO: 2.6 10E3/UL (ref 4–11)

## 2022-11-30 PROCEDURE — 86300 IMMUNOASSAY TUMOR CA 15-3: CPT | Performed by: INTERNAL MEDICINE

## 2022-11-30 PROCEDURE — 99214 OFFICE O/P EST MOD 30 MIN: CPT | Mod: 25 | Performed by: NURSE PRACTITIONER

## 2022-11-30 PROCEDURE — 99207 PR NO CHARGE LOS: CPT

## 2022-11-30 PROCEDURE — 96367 TX/PROPH/DG ADDL SEQ IV INF: CPT | Performed by: NURSE PRACTITIONER

## 2022-11-30 PROCEDURE — 96372 THER/PROPH/DIAG INJ SC/IM: CPT | Mod: 59 | Performed by: NURSE PRACTITIONER

## 2022-11-30 PROCEDURE — 80053 COMPREHEN METABOLIC PANEL: CPT | Performed by: INTERNAL MEDICINE

## 2022-11-30 PROCEDURE — 99207 PR NO CHARGE LOS: CPT | Performed by: NURSE PRACTITIONER

## 2022-11-30 PROCEDURE — 85025 COMPLETE CBC W/AUTO DIFF WBC: CPT | Performed by: INTERNAL MEDICINE

## 2022-11-30 PROCEDURE — 71100 X-RAY EXAM RIBS UNI 2 VIEWS: CPT | Mod: LT | Performed by: RADIOLOGY

## 2022-11-30 PROCEDURE — 96413 CHEMO IV INFUSION 1 HR: CPT | Performed by: NURSE PRACTITIONER

## 2022-11-30 RX ORDER — ALBUTEROL SULFATE 90 UG/1
1-2 AEROSOL, METERED RESPIRATORY (INHALATION)
Status: CANCELLED
Start: 2022-11-30

## 2022-11-30 RX ORDER — HEPARIN SODIUM,PORCINE 10 UNIT/ML
5 VIAL (ML) INTRAVENOUS
Status: CANCELLED | OUTPATIENT
Start: 2022-11-30

## 2022-11-30 RX ORDER — HEPARIN SODIUM (PORCINE) LOCK FLUSH IV SOLN 100 UNIT/ML 100 UNIT/ML
5 SOLUTION INTRAVENOUS
Status: DISCONTINUED | OUTPATIENT
Start: 2022-11-30 | End: 2022-11-30 | Stop reason: HOSPADM

## 2022-11-30 RX ORDER — TRAZODONE HYDROCHLORIDE 50 MG/1
TABLET, FILM COATED ORAL
Qty: 60 TABLET | Refills: 1 | Status: SHIPPED | OUTPATIENT
Start: 2022-11-30 | End: 2023-04-17

## 2022-11-30 RX ORDER — ALBUTEROL SULFATE 0.83 MG/ML
2.5 SOLUTION RESPIRATORY (INHALATION)
Status: CANCELLED | OUTPATIENT
Start: 2022-11-30

## 2022-11-30 RX ORDER — MEPERIDINE HYDROCHLORIDE 25 MG/ML
25 INJECTION INTRAMUSCULAR; INTRAVENOUS; SUBCUTANEOUS EVERY 30 MIN PRN
Status: CANCELLED | OUTPATIENT
Start: 2022-11-30

## 2022-11-30 RX ORDER — HEPARIN SODIUM (PORCINE) LOCK FLUSH IV SOLN 100 UNIT/ML 100 UNIT/ML
5 SOLUTION INTRAVENOUS ONCE
Status: COMPLETED | OUTPATIENT
Start: 2022-11-30 | End: 2022-11-30

## 2022-11-30 RX ORDER — EPINEPHRINE 1 MG/ML
0.3 INJECTION, SOLUTION INTRAMUSCULAR; SUBCUTANEOUS EVERY 5 MIN PRN
Status: CANCELLED | OUTPATIENT
Start: 2022-11-30

## 2022-11-30 RX ORDER — HEPARIN SODIUM (PORCINE) LOCK FLUSH IV SOLN 100 UNIT/ML 100 UNIT/ML
5 SOLUTION INTRAVENOUS
Status: CANCELLED | OUTPATIENT
Start: 2022-11-30

## 2022-11-30 RX ORDER — DIPHENHYDRAMINE HYDROCHLORIDE 50 MG/ML
50 INJECTION INTRAMUSCULAR; INTRAVENOUS
Status: CANCELLED
Start: 2022-11-30

## 2022-11-30 RX ORDER — NALOXONE HYDROCHLORIDE 0.4 MG/ML
0.2 INJECTION, SOLUTION INTRAMUSCULAR; INTRAVENOUS; SUBCUTANEOUS
Status: CANCELLED | OUTPATIENT
Start: 2022-11-30

## 2022-11-30 RX ORDER — METHYLPREDNISOLONE SODIUM SUCCINATE 125 MG/2ML
125 INJECTION, POWDER, LYOPHILIZED, FOR SOLUTION INTRAMUSCULAR; INTRAVENOUS
Status: CANCELLED
Start: 2022-11-30

## 2022-11-30 RX ADMIN — HEPARIN SODIUM (PORCINE) LOCK FLUSH IV SOLN 100 UNIT/ML 5 ML: 100 SOLUTION at 10:27

## 2022-11-30 RX ADMIN — HEPARIN SODIUM (PORCINE) LOCK FLUSH IV SOLN 100 UNIT/ML 5 ML: 100 SOLUTION at 13:48

## 2022-11-30 ASSESSMENT — PAIN SCALES - GENERAL: PAINLEVEL: SEVERE PAIN (6)

## 2022-11-30 NOTE — LETTER
11/30/2022         RE: Shaneka Alvarez  [id number] [facility] [address]        Dear Colleague,    Thank you for referring your patient, Shaneka Alvarez, to the [facility]. Please see a copy of my visit note below.    Oncology Follow Up Visit: November 30, 2022     Oncologist: Dr Dewayne Zepeda  PCP: Mohit Barcenas    Diagnosis: Metastatic Breast Cancer to brain and spine  Shaneka Alvarez is a 63 yo female who was diagnosed with Stage IIB, T2 N1 M0 invasive lobular carcinoma of the right breast  In January 2006.  Final pathology showed a 4.5 x 3.8 x 2.5 cm, 01/14 lymph nodes positive.  Estrogen, progesterone receptor positive, HER-2 negative.     Genetics- BRCA1 and 2 mutations not detected. 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  There was no evidence of new multiple sclerosis lesions.  PET scan on 8/30/2019 which showed widespread bone metastatic lesions (calvarium, spine, sacrum, pelvis, ribs most prominent at C7, T3, L1 and L4), hypermetabolic 3 cm lesion in LLL, and mediastinal/hilar LN. CEA wnl at 1.9 and C27-29 elevated to 415 (28 on 8/23/16). A CT guided biopsy of the right iliac bone was obtained on 9/4/19, pathology consistent with metastatic adenocarcinoma (breast), ER/WA negative, HER-2 negative PDL 1 < 1%. A second biopsy was take of the LLL nodule via EBUS on 9/5/19 did not show any malignancy.  C/T/L spine MRI 8/3/19 to 9/2/19 with numerous enhancing lesions of the C, T and L spine, largest around T9 and L1. No evidence of cord compression. Has a L1 compression fracture and impending L4. completed radiation therapy to T12-L5.-Neurosurgery recommended no surgical intervention but to wear Gorge brace when out of bed and HOB >30 degrees  Treatment:   2006:  ECOG 2104 protocol of dose-dense  Adriamycin, Cytoxan and Avastin x4 cycles followed by Taxol and Avastin x4 cycles.   03/2007:  Completed 1 year Avastin.   11/2006-01/2007:  Radiotherapy to the right breast of 5040 cGy.   03/[id number]:  Aromasin.  Stopped after moving to the [facility].   01/2016:  Right modified radical mastectomy with latissimus dorsi flap reconstruction and a left prophylactic mastectomy with latissimus dorsi flap reconstruction.   9/6/2019 till 9/18/2019 Received radiation T12-L5 3000 cGy in 10 fractions   -Neurosurgery recommended no surgical intervention but to wear Troy brace when out of bed and HOB >30 degrees  9/18/2019 completed T12-L5 radiation in 10 fractions =3000 cGy  10/1/2019 Xeloda x 2 trials lowering dose to 1000 mg bid  10/23/2019 Began every 3 month Zometa  11/5/2019- 1/14/2020  weekly paclitaxel x 3 cycles  1/28-10/2020 Eribulin  11/11/2020- 12/2021 Trodelvy  Repeat biopsy done from the right acetabulum on 2/25/2021 which showed metastatic lobular breast cancer.  Hormonal studies, HER-2 FISH again showed that it is triple negative.  Androgen receptor is diffusely positive.    PD-L1 negative.  Foundation 1 testing did not reveal any actionable mutations  1-3/2022  bicalutamide with bony progression  4/2022 began liposomal doxorubicin     Interval History: Ms. Alvarez comes to clinic for review prior to cycle 9 of Doxil. Pt reporting new left anterior chest pain, just under implant- began 2 or so weeks ago - no trauma or strenuous activity- has tried heat and cold and is now back to using pain pills for this rather than her usual back pain. She also share her appetite is not good but does make herself use protein drinks by day then eats a good supper- weight is moving around a lot. Feels she is good with taking fluids. Denies fevers, illness, SOB or cough. Bowel and bladder are normal.  Walks dog several times each day.  Rest of comprehensive and complete ROS is reviewed and is negative.   Past Medical  "History:   Diagnosis Date     Breast cancer (H)     Age 44     Cataract     left eye     Chemotherapy induced nausea and vomiting 12/15/2020     Common migraine      Esophageal reflux      H/O bilateral mastectomy      Mild major depression (H)      Multiple sclerosis (H)      Pulmonary embolism (H)      Current Outpatient Medications   Medication     acetaminophen (TYLENOL) 500 MG tablet     Buprenorphine HCl (BELBUCA) 150 MCG FILM buccal film     busPIRone (BUSPAR) 15 MG tablet     calcium citrate-vitamin D (CITRACAL) 315-250 MG-UNIT TABS     Cholecalciferol (VITAMIN D3 PO)     ELIQUIS ANTICOAGULANT 5 MG tablet     erenumab-aooe (AIMOVIG) 70 MG/ML injection     ibuprofen (ADVIL/MOTRIN) 600 MG tablet     LORazepam (ATIVAN) 1 MG tablet     morphine (MSIR) 15 MG IR tablet     naratriptan (AMERGE) 2.5 MG tablet     propranolol ER (INDERAL LA) 80 MG 24 hr capsule     traZODone (DESYREL) 50 MG tablet     venlafaxine (EFFEXOR XR) 75 MG 24 hr capsule     vitamin B-2 (RIBOFLAVIN) 25 MG TABS tablet     No current facility-administered medications for this visit.     Facility-Administered Medications Ordered in Other Visits   Medication     denosumab (XGEVA) injection 120 mg     dextrose 5% BOLUS     DOXOrubicin liposome (DOXIL) 70 mg in D5W 310 mL infusion     fosaprepitant (EMEND) 150 mg, dexamethasone (DECADRON) 12 mg in sodium chloride 0.9 % 276.2 mL intermittent infusion     heparin 100 UNIT/ML injection 5 mL     sodium chloride (PF) 0.9% PF flush 3-20 mL     Allergies   Allergen Reactions     Bactrim [Sulfamethoxazole W/Trimethoprim]      Internal bleeding     Copaxone [Glatiramer] Hives       Physical Exam: /82 (BP Location: Left arm, Patient Position: Chair, Cuff Size: Adult Regular)   Pulse 68   Temp 97.6  F (36.4  C) (Oral)   Ht 1.565 m (5' 1.61\")   Wt 60.3 kg (133 lb)   SpO2 100%   BMI 24.63 kg/m     GENERAL: Healthy, alert and no distress  Constitutional: Alert, healthy, and in no distress.   ENT: " Eyes grossly normal to inspection.  No discharge or erythema, or obvious scleral/conjunctival abnormalities. No mouth sores  Neck: Supple, No adenopathy.  Cardiac: Heart rate and rhythm is regular with normal S1 and S2 without murmur  Respiratory: Breathing easy. Lung sounds clear to auscultation  Breasts: Pressure to left lateral chest and just above left breast does cause pain- no deformities noted  Abdomen: Soft, non-tender, BS normal. No masses or organomegaly  MS: Muscle tone normal. extremities normal with no edema.   Skin: No suspicious lesions or rashes  Neuro: Sensory grossly WNL, gait normal.   Lymph: Normal ant/post cervical, axillary, supraclavicular nodes  Psych: Mentation appears normal and affect normal/bright with good conversation.  Normal speech and appearance well-groomed.    Laboratory Results:   Results for orders placed or performed in visit on 11/30/22   Comprehensive metabolic panel     Status: Abnormal   Result Value Ref Range    Sodium 137 133 - 144 mmol/L    Potassium 4.1 3.4 - 5.3 mmol/L    Chloride 105 94 - 109 mmol/L    Carbon Dioxide (CO2) 26 20 - 32 mmol/L    Anion Gap 6 3 - 14 mmol/L    Urea Nitrogen 13 7 - 30 mg/dL    Creatinine 0.72 0.52 - 1.04 mg/dL    Calcium 8.7 8.5 - 10.1 mg/dL    Glucose 104 (H) 70 - 99 mg/dL    Alkaline Phosphatase 60 40 - 150 U/L    AST 22 0 - 45 U/L    ALT 26 0 - 50 U/L    Protein Total 6.4 (L) 6.8 - 8.8 g/dL    Albumin 3.3 (L) 3.4 - 5.0 g/dL    Bilirubin Total 0.5 0.2 - 1.3 mg/dL    GFR Estimate >90 >60 mL/min/1.73m2   CBC with platelets and differential     Status: Abnormal   Result Value Ref Range    WBC Count 2.6 (L) 4.0 - 11.0 10e3/uL    RBC Count 4.46 3.80 - 5.20 10e6/uL    Hemoglobin 12.7 11.7 - 15.7 g/dL    Hematocrit 39.0 35.0 - 47.0 %    MCV 87 78 - 100 fL    MCH 28.5 26.5 - 33.0 pg    MCHC 32.6 31.5 - 36.5 g/dL    RDW 15.3 (H) 10.0 - 15.0 %    Platelet Count 154 150 - 450 10e3/uL    % Neutrophils 53 %    % Lymphocytes 17 %    % Monocytes 26 %     % Eosinophils 2 %    % Basophils 1 %    % Immature Granulocytes 1 %    NRBCs per 100 WBC 0 <1 /100    Absolute Neutrophils 1.4 (L) 1.6 - 8.3 10e3/uL    Absolute Lymphocytes 0.4 (L) 0.8 - 5.3 10e3/uL    Absolute Monocytes 0.7 0.0 - 1.3 10e3/uL    Absolute Eosinophils 0.1 0.0 - 0.7 10e3/uL    Absolute Basophils 0.0 0.0 - 0.2 10e3/uL    Absolute Immature Granulocytes 0.0 <=0.4 10e3/uL    Absolute NRBCs 0.0 10e3/uL   RBC and Platelet Morphology     Status: None   Result Value Ref Range    Platelet Assessment  Automated Count Confirmed. Platelet morphology is normal.     Automated Count Confirmed. Platelet morphology is normal.    RBC Morphology Confirmed RBC Indices    CBC with platelets differential     Status: Abnormal    Narrative    The following orders were created for panel order CBC with platelets differential.  Procedure                               Abnormality         Status                     ---------                               -----------         ------                     CBC with platelets and d...[[id number]]  Abnormal            Final result               RBC and Platelet Morphology[[id number]]                      Final result                 Please view results for these tests on the individual orders.        Assessment and Plan:   Metastatic Breast Cancer to brain and spine-Pt continues with Doxil every 28 days and will be due for cycle 9 today- she is meeting goals with review, labs and exam today and will be receiving Xgeva as well.   No changes to plan.   -new left chest pain under implant- review shows painful rib cage with know history of bone mets though no active mets noted with last chest CT in October. Pt does have a very active dog. Will get rib xray to check on possible fracture. Pain to be covered with icing as able, tylenol or- if need be- 1 morphine tab. Will wait for results of xray.   - poor appetite- may be side effect of plan but pt working well to have protein drinks available  and does take 1 good meal daily with no significant weight loss at this time.   The total time of this encounter amounted to 30 minutes. This time included face to face time spent with the patient, prep work, ordering tests, and performing post visit documentation.  Annie Bailon Cnp            Again, thank you for allowing me to participate in the care of your patient.        Sincerely,        Annie Bailon, TAY, APRN CNP

## 2022-11-30 NOTE — NURSING NOTE
"Oncology Rooming Note    November 30, 2022 10:52 AM   Shaneka Alvarez is a 62 year old female who presents for:    Chief Complaint   Patient presents with     Oncology Clinic Visit     Prior to tx     Initial Vitals: /82 (BP Location: Left arm, Patient Position: Chair, Cuff Size: Adult Regular)   Pulse 68   Temp 97.6  F (36.4  C) (Oral)   Ht 1.565 m (5' 1.61\")   Wt 60.3 kg (133 lb)   SpO2 100%   BMI 24.63 kg/m   Estimated body mass index is 24.63 kg/m  as calculated from the following:    Height as of this encounter: 1.565 m (5' 1.61\").    Weight as of this encounter: 60.3 kg (133 lb). Body surface area is 1.62 meters squared.  Severe Pain (6) Comment: Data Unavailable   No LMP recorded. Patient is postmenopausal.  Allergies reviewed: Yes  Medications reviewed: Yes    Medications: Medication refills not needed today.  Pharmacy name entered into [facility]:    [facility]  - [address] [facility] [address] [place] [id number] [facility] [address]     Clinical concerns: Yes Annie was notified.      Eliza Thakur CMA              "

## 2022-11-30 NOTE — PROGRESS NOTES
Infusion Nursing Note:  Shaneka Alvarez presents today for C9D1 Doxil and Xgeva injection.    Patient seen by provider today: Yes: Annie Bailon NP   present during visit today: Not Applicable.    Note:   Xgeva given in left arm due to history of lymph node removal from right axilla.    Intravenous Access:  Implanted Port.    Treatment Conditions:  Patient states she is taking Calcium and Vitamin D.  Lab Results   Component Value Date    HGB 12.7 11/30/2022    WBC 2.6 (L) 11/30/2022    ANEU 1.9 10/26/2021    ANEUTAUTO 1.4 (L) 11/30/2022     11/30/2022      Lab Results   Component Value Date     11/30/2022    POTASSIUM 4.1 11/30/2022    MAG 2.0 04/21/2022    CR 0.72 11/30/2022    RAFAELA 8.7 11/30/2022    BILITOTAL 0.5 11/30/2022    ALBUMIN 3.3 (L) 11/30/2022    ALT 26 11/30/2022    AST 22 11/30/2022     Results reviewed, labs MET treatment parameters, ok to proceed with treatment.    Post Infusion Assessment:  Patient tolerated infusion without incident.  Patient tolerated injection without incident.  Blood return noted pre and post infusion.  Site patent and intact, free from redness, edema or discomfort.  No evidence of extravasations.  Access discontinued per protocol.     Discharge Plan:   AVS to patient via MYCHART.  Patient will return 12/28/22 for next appointment.   Patient discharged in stable condition accompanied by: self.  Departure Mode: Ambulatory.      Lauren Shaw RN

## 2022-12-20 ENCOUNTER — VIRTUAL VISIT (OUTPATIENT)
Dept: ONCOLOGY | Facility: CLINIC | Age: 62
End: 2022-12-20
Payer: MEDICARE

## 2022-12-20 DIAGNOSIS — C50.912 BILATERAL MALIGNANT NEOPLASM OF BREAST IN FEMALE, UNSPECIFIED ESTROGEN RECEPTOR STATUS, UNSPECIFIED SITE OF BREAST (H): ICD-10-CM

## 2022-12-20 DIAGNOSIS — D70.1 CHEMOTHERAPY-INDUCED NEUTROPENIA (H): ICD-10-CM

## 2022-12-20 DIAGNOSIS — C79.51 BONE METASTASES: Primary | ICD-10-CM

## 2022-12-20 DIAGNOSIS — E87.6 HYPOKALEMIA: ICD-10-CM

## 2022-12-20 DIAGNOSIS — C50.919 METASTATIC BREAST CANCER: ICD-10-CM

## 2022-12-20 DIAGNOSIS — C50.911 BILATERAL MALIGNANT NEOPLASM OF BREAST IN FEMALE, UNSPECIFIED ESTROGEN RECEPTOR STATUS, UNSPECIFIED SITE OF BREAST (H): ICD-10-CM

## 2022-12-20 DIAGNOSIS — T45.1X5A CHEMOTHERAPY-INDUCED NEUTROPENIA (H): ICD-10-CM

## 2022-12-20 PROCEDURE — 99214 OFFICE O/P EST MOD 30 MIN: CPT | Mod: 95 | Performed by: INTERNAL MEDICINE

## 2022-12-20 RX ORDER — HEPARIN SODIUM (PORCINE) LOCK FLUSH IV SOLN 100 UNIT/ML 100 UNIT/ML
5 SOLUTION INTRAVENOUS
Status: CANCELLED | OUTPATIENT
Start: 2022-12-28

## 2022-12-20 RX ORDER — NALOXONE HYDROCHLORIDE 0.4 MG/ML
0.2 INJECTION, SOLUTION INTRAMUSCULAR; INTRAVENOUS; SUBCUTANEOUS
Status: CANCELLED | OUTPATIENT
Start: 2022-12-28

## 2022-12-20 RX ORDER — MEPERIDINE HYDROCHLORIDE 25 MG/ML
25 INJECTION INTRAMUSCULAR; INTRAVENOUS; SUBCUTANEOUS EVERY 30 MIN PRN
Status: CANCELLED | OUTPATIENT
Start: 2022-12-28

## 2022-12-20 RX ORDER — HEPARIN SODIUM,PORCINE 10 UNIT/ML
5 VIAL (ML) INTRAVENOUS
Status: CANCELLED | OUTPATIENT
Start: 2022-12-28

## 2022-12-20 RX ORDER — DIPHENHYDRAMINE HYDROCHLORIDE 50 MG/ML
50 INJECTION INTRAMUSCULAR; INTRAVENOUS
Status: CANCELLED
Start: 2022-12-28

## 2022-12-20 RX ORDER — METHYLPREDNISOLONE SODIUM SUCCINATE 125 MG/2ML
125 INJECTION, POWDER, LYOPHILIZED, FOR SOLUTION INTRAMUSCULAR; INTRAVENOUS
Status: CANCELLED
Start: 2022-12-28

## 2022-12-20 RX ORDER — ALBUTEROL SULFATE 0.83 MG/ML
2.5 SOLUTION RESPIRATORY (INHALATION)
Status: CANCELLED | OUTPATIENT
Start: 2022-12-28

## 2022-12-20 RX ORDER — ALBUTEROL SULFATE 90 UG/1
1-2 AEROSOL, METERED RESPIRATORY (INHALATION)
Status: CANCELLED
Start: 2022-12-28

## 2022-12-20 RX ORDER — EPINEPHRINE 1 MG/ML
0.3 INJECTION, SOLUTION INTRAMUSCULAR; SUBCUTANEOUS EVERY 5 MIN PRN
Status: CANCELLED | OUTPATIENT
Start: 2022-12-28

## 2022-12-20 NOTE — PROGRESS NOTES
Shaneka is a 62 year old who is being evaluated via a billable video visit.      Patient stated he is in the state of [place] for the visit today.    How would you like to obtain your AVS? MyChart  If the video visit is dropped, the invitation should be resent by: Text to cell phone: [phone]  Will anyone else be joining your video visit? Claudia Gomez, Virtual Visit Facilitator      Video-Visit Details    Video Start Time: 10:16 AM    Type of service:  Video Visit    Video End Time:10:21 AM    Originating Location (pt. Location): Home    Distant Location (provider location):  Off-site    Platform used for Video Visit: [facility]    ONCOLOGY FOLLOW UP NOTE: 12/20/22        I took the history from reviewing the previous notes that she was following with Dr. Amaya.  I have copied and updated from prior notes.    ONCOLOGY History:    1. January 2006:  Diagnosed with Stage IIB, T2 N1 M0 invasive lobular carcinoma of the right breast.  Final pathology showed a 4.5 x 3.8 x 2.5 cm, 01/14 lymph nodes positive.  Estrogen, progesterone receptor positive, HER-2 negative.     2.  Genetics.  BRCA1 and 2 mutations not detected. 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       THERAPY TO DATE:   1.  2006:  ECOG 2104 protocol of dose-dense Adriamycin, Cytoxan and Avastin x4 cycles followed by Taxol and Avastin x4 cycles.   2.  03/2007:  Completed 1 year Avastin.   3.  11/2006-01/2007:  Radiotherapy to the right breast of 5040 cGy.   4.  03/[id number]:  Aromasin.  Stopped after moving to the [facility].   5.  01/2016:  Right modified radical mastectomy with latissimus dorsi flap reconstruction and a left prophylactic mastectomy with latissimus dorsi flap reconstruction.     She recently had MRI of the brain as a follow-up for multiple sclerosis and there were multiple calvarial lesions noted which was suspicious for metastatic disease.  There was no evidence of new multiple sclerosis lesions.  She had a PET scan on 8/30/2019  which showed widespread bone metastatic lesions (calvarium, spine, sacrum, pelvis, ribs most prominent at C7, T3, L1 and L4), hypermetabolic 3 cm lesion in LLL, and mediastinal/hilar LN. CEA wnl at 1.9 and C27-29 elevated to 415 (28 on 8/23/16). A CT guided biopsy of the right iliac bone was obtained on 9/4/19, pathology consistent with metastatic adenocarcinoma (breast), ER/FL negative, HER-2 negative PDL 1 < 1%. A second biopsy was take of the LLL nodule via EBUS on 9/5/19 did not show any malignancy.    C/T/L spine MRI 8/3/19 to 9/2/19 with numerous enhancing lesions of the C, T and L spine, largest around T9 and L1. No evidence of cord compression. Has a L1 compression fracture and impending L4.     Received radiation T12-L5 3000 cGy in 10 fractions from 9/6/2019 till 9/18/2019.  Neurosurgery recommended no surgical intervention but to wear Gorge brace when out of bed and HOB >30 degrees    She started palliative Xeloda 2000/1500 on 10/1/2019 but after just a few doses she noticed nausea, red eyes blurred vision, loss of appetite.  She stopped taking it after 5 doses and was seen by nurse practitioner on 10/7/2019 when she was improving.  At that point she was restarted on Xeloda at a lower dose of 1000 mg twice a day.  As she was not able to tolerate even the lower dose with extreme nausea and vomiting and feeling extremely fatigued and blurry vision, we decided on stopping Xeloda.    We decided on repeating scans and MRI brain on 10/25/2019 showed no intracranial metastatic disease.   Multiple enhancing calvarial lesions may be increased in number and are suggestive of metastatic disease. Thinner imaging on the current study may identify the smaller lesions and may be responsible for  identified more lesions.   There is a single focus of T2 hyperintense signal within the anterior right temporal lobe may represent interval demyelination. There is no evidence for active demyelination.    PET/CT on 11/1/2019  showed favorable response to therapy and Overall FDG uptake within scattered osseous metastases is decreased since 8/30/2019, particularly within the pelvis. Increased sclerotic appearance of L1 and L4 pathologic compression deformities, likely sequela of recently completed palliative radiation therapy.    No significant FDG uptake in previously demonstrated hypermetabolic mediastinal lymph nodes. Biopsy negative on 9/5/2019.  There is also decreased FDG uptake in the left lower lobe (biopsy negative for  malignancy), now with dense consolidation containing air bronchograms suggestive of infection versus aspiration.  There is diffuse FDG uptake within the esophagus, consistent with esophagitis.    Because of intolerance to Xeloda we decided on switching to weekly paclitaxel on 11/5/2019.    C#2 Taxol 12/4/19- started with 70mg/m2 dose reduction    C#3 Taxol 12/30/2019     PET/CT on 1/24/2020 showed progression of the disease in some of the bone lesions including increase in size and lytic lesion within the vertebral body of C4 measuring 1 cm as opposed to 0.6 cm previously and a new central soft tissue nodule with SUV max 4.1 when previously it was non-hypermetabolic.  There is also some progression noted in the right proximal femur and right iliac bone.  There is decreased metabolic uptake in the right lamina of T9 with SUV max 4.7 when previously it was 8.0.  Other lesions are stable.    CA 27-29 also had increased significantly and it was 257 on 1/28/2020.    We decided on switching to eribulin on 1/28/2020.    C#2 2/18/2020  C#2 D#8 2/25/2020    C#3 D#1 3/18/2020 -delayed by 1 week as per patient's preference because she wanted to visit her family.    She had a repeat PET/CT on 3/27/2020 which showed stable size of the bone metastatic lesions although the FDG avidity was less, consistent with treatment response.    She had MRI of the thoracic spine on 4/3/2020 and it was compared to the one which was done in  August 2019 and it showed increased size of several metastatic lesions most notably at T9 but also at T5-T7.  Other lesions at T10, T11 and T12 appeared improved.    CA 27-29 was also down to 222 on 3/18/2020.    Cycle #4 eribulin ( dose reduced to 1.2 mg/m2 )-4/7/2020-   Cycle #5 Eribulin ( 1.2 mg/m2 )- 4/28/2020   Cycle #6 Eribulin ( 1.2 mg/m2 )- 5/19/2020     Cycle #7 Eribulin ( 1.2 mg/m2 )- 6/9/2020    Cycle #8 Eribulin ( 1.2 mg/m2 )- 6/30/2020     She had a repeat PET scan on 7/17/2020 which showed incidental finding of new acute bilateral pulmonary embolism in the distal left main pulmonary artery extending in the left upper and lower lobes and in the segmental and branch arteries in the right lower lobe.    It also showed mildly increased FDG avidity in the lytic lesion in the T9 lamina and right pedicle with SUV max 5.3, previously 3.9.  That is also slightly increased FDG uptake to the left anterior fifth rib at the costochondral junction SUV max 3.9, previously 2.5.  There is slightly decreased FDG uptake in the right femoral neck lesion SUV max 2.2, previously 2.9.  FDG uptake in other bone lesions is fairly stable.  No new metastasis are seen.    CA 27-29 was 308 on 6/30/2020.  It was 286 on 5/19/2020.    Overall this is consistent with only slight progression versus stable disease.    She was started on Lovenox.    Cycle #9 eribulin 7/21/2020. CA 27-29 was 238    C#10 eribulin 8/18/2020. ( delayed by one week for ANC 0.9 )    C#11 Eribulin 9/8/2020    C#12 Eribulin 9/29/2020     C#13 Eribulin 10/20/2020     PET scan on 11/6/2020 shows progression of the disease with increased FDG uptake in the lytic lesions in the T9/T10 and right posterolateral elements as well as increased FDG uptake in the previously known hypermetabolic right iliac bone lesion suggesting recurrence of previously treated metastasis.  There is new subtle lesion in the right manubrium.  There is also a new fracture in the anterolateral  left fourth rib with associated uptake and this could be a pathologic fracture.  Healing fracture in the left anterior fifth rib lesion.  There is resolution of the left pulmonary emboli.  Decreased FDG uptake of the esophagus consistent with improving inflammation.    CA 27-29 on 10/20/2020 was also elevated at 489.    C#1 Trodelvy on 11/11/2020.  Cycle #2 Trodelvy 12/2/2020  Cycle number 2-day #8 Trodelvy 12/8/2020.    12/15/2020-12/16/2020.  She was admitted to the hospital with nausea vomiting and dehydration.  She had tachycardia and initial lactic acid of 5.  It decreased to 1.4 after 2 L of normal saline.  She also had hypokalemia and ANC was 1.3.  She was treated with IV fluids.  Procalcitonin was negative.  CTA of the chest was negative for pulmonary embolism or pneumonia.  No bacterial infection was documented.  Her medication which she was initially unable to tolerate at home, were restarted and she was discharged home once started to feel better.      We delayed the start of cycle number 3 x 1 week and decrease the dose of chemotherapy by 25%.  She also had neutropenia with ANC of 1.0.      She started cycle number 3-day #1 on 12/29/2020.  CA 27-29 was 392.    Cycle number 3-day #8 1/5/2021.    C#4 Trodelvy 1/20/2021- 75% dose    2/5/2021.  PET/CT overall shows a good response to treatment with improvement of previously hypermetabolic lesions in the spine and pelvis and stability of other bone meta stasis.  There is a single lytic lesion in the right iliac bone which demonstrates slight Yimi increased FDG uptake and has SUV max 4.7 while previously it was SUV max 3.4.  There was diffuse wall thickening of the esophagus with uptake in the esophagus in the fundus of the stomach and this could be seen with inflammation.    Trodelvy cycle #5 - 2/10/2021.  75% of the dose.  CA 27-29 was 337.    Trodelvy cycle #6 - 3/3/2021.  75% of the dose.  Trodelvy cycle #6, D#8 - 3/10/2021.  75% of the dose.    Trodelvy  C#8, D#8 on 4/21/2021  CA 27-29 stable at 371 on 4/14/2021    Trodelvy, cycle #9- 5/5/2021        On 2/25/2020 when she had biopsy of the right acetabulum and it was consistent with metastatic lobular carcinoma.  Again it was Triple Negative.     On 3/18/2020 when she also met with Dr. Arelis Arshad who also advised testing for androgen receptor studies on the biopsy specimen.  Tumor was diffusely androgen receptor positive.      5/26/2021-cycle #10 Trodelvy started    CA 27-29 was 545.    6/11/2021.  PET/CT shows mildly increased uptake in several bone lesions for example in the left anterior iliac crest, mid right iliac crest and left ischium.  Uptake in other bone lesions are similar to previously.    Because of minimal progression of the disease and she is tolerating chemotherapy very well, we decided on continuing the same chemotherapy.    6/16/2021-Trodelvy cycle #11    7/7/2021 -Trodelvy cycle #12    9/14/2021-Trodelvy-cycle #15, day #8.    9/8/2021-CA 27-29 was more elevated and it was 1176.    PET/CT on 9/24/2021 showed stable findings with no evidence of FDG avid metastatic disease within the body.  Left axillary lymph nodes are prominent and hypermetabolic and likely from recent vaccinations in the left deltoid muscle.  Healed bony metastasis seems stable.      Cycle #16, Trodelvy 9/28/2021.  Cycle #17, day #8 Trodelvy was on 10/26/2021.  Cycle #18, day #8 Trodelvy on 11/22/2021       She saw Dr. Mejia whom on 10/6/2021.  She was not considered eligible for Enfortumab trial.    Cycle #19, Trodelvy on 12/7/2021 12/21/2021.  PET/CT showed progression of bony metastatic disease.  There is increase hypermetabolism in both the right and left iliac bones and the sternum.  Other bone lesions are stable.    There is new scattered areas of groundglass throughout both the lungs and these findings are indeterminate but may represent an infectious etiology.  Interval resolution of left axillary FDG avid  lymphadenopathy.    She was started on Casodex 150 mg daily on 1/6/2022.        She was admitted to the hospital from 3/17/2022-3/19/2022 for vomiting and diarrhea and  severe back pain.  I reviewed the discharge summary.  CT lumbar spine showed a stable severe chronic L1 pathologic compression fracture.  Stable mild compression deformity of superior endplate of L3 and superior endplate of L4.  The upper margin of the L4 fracture extends slightly into the spinal canal without high-grade canal stenosis and this is also stable.  Nondisplaced fractures coursing through the L3 pedicles bilaterally were seen which were not clearly seen previously this could be acute and likely pathologic.  No extraspinal soft tissue abnormality.  Neurosurgery recommended conservative management.  Her pain was controlled and she was discharged home as she felt better with this.      3/31/2022-PET/CT shows progressive bone meta stasis with progressive FDG uptake in the following lesions. Left iliac crest lesion with max SUV of 7.2, previously 3.5. Right mid iliac crest lytic lesion shows maximum SUV of 7.8, previously 6.9.  T10 vertebral body mixed lytic and sclerotic lesion with an SUV of 7.1, previously not hypermetabolic. Thoracic vertebral bodies 8, 7, 5, and 4 also show hypermetabolic lesions were previously there was no hypermetabolism.  Some of the sclerotic osseous lesions are unchanged and do not show increased hypermetabolism compatible treated lesion such as a severe compression deformity at L1 as well as superior compression deformity at L4.  Medial right clavicle sclerotic lesion with SUV max of 4.8, previously not hypermetabolic. There is also right-sided sternal lesions.      4/14/2022--C#1- switch to single agent Doxil 50 mg per metered squared every 4 weeks.    5/13/2022-cycle #2- Doxil- dose reduced to 40 mg per metered square due to severe nausea/vomiting and migraines requiring IV fluids and IV  antiemetics.    6/10/2022-cycle #3-Doxil 40 mg per metered square    7/1/2022- PET/CT shows resolution of the hypermetabolic skeletal metastasis.    7/6/2022-cycle #4-Doxil 40 mg per metered square  8/5/2022- cycle #5-Doxil  9/1/2022.  Cycle #6-Doxil  9/30/2022- cycle #7-Doxil    10/18/2022.  PET/CT does not show evidence of any FDG activity    11/2/2022-cycle #8-Doxil.  11/30/2022-cycle #9-Doxil.      Interval history.  This is a video visit.    Overall she tells me that she has been tolerating chemotherapy very well with mild nausea.  This is more so related to the migraine headaches.  At times migraine headaches bothers her.  She is taking Aimovig every 30 days and takes Amerge as needed for migraine headaches.  Cancer related pain is under fair control with buprenorphine buccal film.  She has to take morphine only once over the last couple of months.  Bowel movements are fine.  No new swellings.  No infections or shortness of breath.  She has mild fatigue which is a stable.  She continues to take vitamin D and calcium.  Overall she is very pleased with how the treatments are going.  Neuropathy is at her baseline.       ECOG 1    ROS:  Rest of the comprehensive review of the system was negative.        I reviewed other history in epic as below.       PAST MEDICAL HISTORY:     1.  Breast cancer  2.  Multiple sclerosis.   3.  Depression.   4.  Migraines.   5.  Hypertension.   6.  Cholecystectomy and umbilical hernia repair.     SOCIAL HISTORY: She smoked for 5 years but quit many years ago.  Drinks alcohol socially.  She lives with her .  She is a teacher in middle school.       FAMILY HISTORY: She mentions that her mother had breast cancer at 49.  Both grandmothers had breast cancer but she is not sure of the age.  A couple of cousins have cancers.  Patient has 3 children who are healthy.    Current Outpatient Medications   Medication     acetaminophen (TYLENOL) 500 MG tablet     Buprenorphine HCl (BELBUCA)  150 MCG FILM buccal film     busPIRone (BUSPAR) 15 MG tablet     calcium citrate-vitamin D (CITRACAL) 315-250 MG-UNIT TABS     Cholecalciferol (VITAMIN D3 PO)     ELIQUIS ANTICOAGULANT 5 MG tablet     erenumab-aooe (AIMOVIG) 70 MG/ML injection     ibuprofen (ADVIL/MOTRIN) 600 MG tablet     LORazepam (ATIVAN) 1 MG tablet     morphine (MSIR) 15 MG IR tablet     naratriptan (AMERGE) 2.5 MG tablet     propranolol ER (INDERAL LA) 80 MG 24 hr capsule     traZODone (DESYREL) 50 MG tablet     venlafaxine (EFFEXOR XR) 75 MG 24 hr capsule     vitamin B-2 (RIBOFLAVIN) 25 MG TABS tablet     No current facility-administered medications for this visit.        Allergies   Allergen Reactions     Bactrim [Sulfamethoxazole W/Trimethoprim]      Internal bleeding     Copaxone [Glatiramer] Hives          PHYSICAL EXAMINATION:     There were no vitals taken for this visit.  Wt Readings from Last 4 Encounters:   11/30/22 60.3 kg (133 lb)   11/18/22 58.5 kg (129 lb)   11/02/22 62.6 kg (138 lb)   10/26/22 61.7 kg (136 lb)       Constitutional.  Does not seem to be in any acute distress.  Eyes.  No redness or discharge noted.  Respiratory.  Speaking in full sentences.  Breathing seems comfortable without any accessory use of muscles.    Skin.  Visualized his skin does not show any obvious rashes.  Musculoskeletal.  Range of motion for visualized areas is intact.  Neurological.  Alert and oriented x3.  Psychiatric.  Mood, mentation and affect are normal.  Decision making capacity is intact.      The rest of a comprehensive physical examination is deferred due to Public Health Emergency video visit restrictions.        Labs and Imaging.    Reviewed.    11/30/2022   CBC shows WBC 2.6.  Hemoglobin 12.7.  Platelets 154.  ANC 1.4.  Lymphocyte 0.4.   Chemistry unremarkable except albumin 3.3.  CA 15-3 was 135.    9/30/2022  CA 15-3 was 159.  CA 27-29 was 1992 on 6/10/2022  CA 27-29 was 3370 on 5/13/2022.  It was 5307 on 4/14/2022 ferritin Doxil was  started.      9/28/2021.      Iron studies, ferritin is 101, iron 51, TIBC 268 and iron saturation index 19%.    11/30/2022-x-ray of the ribs of the left side  There are a few sclerotic lesion seen in the right anterior ribs that were seen on previous PET CTs. These appear to involve the fourth and fifth ribs. There are likely more subtle lesions that were   described on the PET CT scan. Irregularity of the distal end of the left ninth rib may be from metastatic disease or fracture on the oblique view which likely is chronic.       10/18/2022-PET/CT showed no new suspicious FDG uptake within the skeleton.  Stable abnormal FDG avid lytic and sclerotic osseous lesions.  Mild diffuse FDG uptake throughout the large bowel without CT abnormality.    7/1/2022.  PET/CT shows diffuse sclerotic and lytic osseous lesions without any abnormal FDG uptake in the skeleton, consistent with treated disease.  There is evaluation of previous hypermetabolic bone lesions.  Multiple chronic rib fracture deformities and stable compression fracture deformities of T6, L1 and L4.  No suspicious FDG uptake in the chest abdomen or pelvis.    3/31/2022-PET/CT shows progressive bone metastasis with progressive FDG uptake in the following lesions. Left iliac crest lesion with max SUV of 7.2, previously 3.5. Right mid iliac crest lytic lesion shows maximum SUV of 7.8, previously 6.9.  T10 vertebral body mixed lytic and sclerotic lesion with an SUV of 7.1, previously not hypermetabolic. Thoracic vertebral bodies 8, 7, 5, and 4 also show hypermetabolic lesions were previously there was no hypermetabolism.  Some of the sclerotic osseous lesions are unchanged and do not show increased hypermetabolism compatible treated lesion such as a severe compression deformity at L1 as well as superior compression deformity at L4.  Medial right clavicle sclerotic lesion with SUV max of 4.8, previously not hypermetabolic. There is also right-sided sternal  lesions.      Biopsy from the right acetabulum shows metastatic lobular carcinoma.  It is triple negative.  Androgen receptor was diffusely positive (90%, moderate intensity) by immunohistochemistry. )  PD-L1 negative.    Foundation one showed CDH1 mutation (initially lobular cancer), CCND1 amplification ( equivocal ). FGF3, FGF4, FGF19 amplification ( Equivocal ). Most promising is CCND1 amplification with abemaciclib showing response in 4/10 patients. FGF3,FGF4 and FGF19 are targetable with pan-FGFR inhibitors.           ASSESSMENT AND PLAN:     Metastatic breast cancer negative breast cancer (androgen receptor positive ) with extensive involvement of the bones.  There is also a left lower lobe hypermetabolic lesion and mediastinal lymph nodes which are hypermetabolic.  The biopsy from the right iliac bone is consistent with metastatic lobular breast cancer but it is hormone receptor and HER-2 negative and PDL 1 is also negative.    Foundation 1 testing did not reveal any actionable mutations.    She was intolerant to Xeloda and progressed on Taxol and eribulin.      We then switched to recently FDA approved sacituzumab govitecan-hziy (Trodelvy) for TNBC, based on the results of the phase II IMMU-132-01 clinical trial.   She started this on 11/11/2020.      She obtained a second opinion from Dr. Castillo from [facility] who recommended a repeat biopsy to be done to make sure that she really has triple negative breast cancer.      She also met with Dr. Arelis Arshad on 3/18/2021.      After 10 cycles of Trodelvy, she had PET/CT on 6/11/2021 which showed mildly increased uptake in some of the bone lesions while stability in other bone lesions.        After 15 cycles, repeat PET scan showed stable findings.  CA 27-29 has been increasing and it is 1176.      As she was tolerating the chemotherapy well and her quality of life has been decent and scans were stable although she had a rising CA 27-29, we decided  on continuing the same chemotherapy because it has been a very slow progression of the disease.    Now there is clear progression of the disease in the bones.  There is increased FDG uptake in both right and left iliac bones and the sternum.    Foundation one showed CDH1 mutation (initially lobular cancer), CCND1 amplification ( equivocal ). FGF3, FGF4, FGF19 amplification ( Equivocal ). Most promising is CCND1 amplification with abemaciclib showing response in 4/10 patients. FGF3,FGF4 and FGF19 are targetable with pan-FGFR inhibitor    As the tumor is diffusely positive for androgen receptor, we decided to start her on androgen receptor blockers.    I initially recommended enzalutamide 160 mg daily ( Enzalutamide for the Treatment of Androgen Receptor-Expressing Triple-Negative Breast Cancer----J Clin Oncol. 2018 Mar 20; 36(9): 884-890. ).    Eventually we decided to start her on Casodex 150 mg daily instead of Enzalutamide due to cost issues.  Clinical Trial Clin Cancer Res. 2013 Oct 1;19(19):5505-12.   Phase II trial of bicalutamide in patients with androgen receptor-positive, estrogen receptor-negative metastatic Breast Cancer  She started Casodex on 1/6/2022.    She had progressive disease in the bones on the PET scan on 3/31/2022.    We changed to single agent Doxil on 4/14/2022.      She developed significant nausea vomiting and headache so cycle #2 was given on 5/13/2022 with 20% dose reduction which she tolerated well.    Cycle #3 was given on 6/10/2022 with the same dose reduced Doxil.    Repeat PET/CT on 7/1/2022 shows resolution of the FDG avid bony metastasis consistent with treated disease.  No suspicious FDG uptake was seen in the chest abdomen and pelvis.  CA 27-29 was also coming down.     Repeat PET/CT in October 2022 after completing 7 cycles continued to show normal new FDG uptake.  CA 15-3 was trending down     She has completed 9 cycles of Doxil and the last one was on 11/30/2022.  CA 15-3 was  down to 135.    She is tolerating the chemotherapy very well and the plan is to continue with the same.  She will be due for cycle #10 on 12/28/2022.    Baseline echocardiogram on 4/7/2022 was unremarkable with EF 60 to 65%.    Bone disease.  She has metastatic disease to the bone.  Previously she got palliative radiation to T12-L5 3000 cGy in 10 fractions from 9/6/2019 till 9/18/2019.  She was evaluated by orthopedics Dr. Bipin Elliott on 11/1/2019 and conservative management was recommended.    She was on Zometa every 3 months. She last received on 9/29/2020.  Because of progression of the disease in the bones, we switched to Xgeva which she received on 11/11/2020.    She had progression of the disease in the bones so we switched to Doxil but we continued xgeva.  PET scan on 7/1/2022 does not show any suspicious FDG uptake in the bones consistent with treated disease.  Repeat PET/CT on 10/18/2022 also does not show any FDG avid lesions in the skeleton.  Currently her pain is well controlled with the current pain regimen.  She will continue calcium vitamin D.  We will continue to give her Xgeva.      Leukopenia/neutropenia.  She has mild neutropenia from the chemotherapy.  No infections.  Continue to observe.    Pain management.  She is following with palliative care and remains a stable on buprenorphine buccal film.  She mentioned that she has taken as needed morphine only once over the last 2 months.     Migraines. She is on monthly Aimovig injections and takes Amerge as needed for migraines.  Lately it has been bothering her more.  She will follow with neurology.      Nausea.  Overall mild.  She can take as needed antinausea medications.      Bilateral pulmonary emboli.  Continue Eliquis indefinitely      We did not address the following today.      Shingles.  The rash has dried out.  She completed well with acyclovir and prednisone.  She takes gabapentin for postherpetic neuralgia and is doing better.         Constipation. Continue senna.    Neuropathy.  This remains mild and stable with neuropathy in her hands.    This is in addition to the chronic balance issues and heat and cold sensitivity from underlying multiple sclerosis which is also stable for years.  Continue to monitor.     Subcutaneous nodule in the scalp.  This looks like an epidermoid cyst.  She thinks it is a stable.  Continue to monitor.       Insomnia.  Continue trazodone.      Wall thickening of esophagus and FDG uptake of fundus of the stomach.  It could be in the setting of inflammation from recent nausea and vomiting.  Symptoms have resolved.  Continue to monitor clinically.    Vision changes.    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  It was thought that this could be due to Taxol as patient reported to the ophthalmologist that she was on Taxol in September 2019 when her symptoms started.  But she was not on Taxol in September 2019 when she started noticing the visual changes so Taxol is not responsible for this.  The first dose of Taxol was on 11/5/2019.   She had left cataract extraction on 2/8/2021 and she has noticed significant improvement in her vision.       Increased FDG ability in the colon.  She had FDG uptake in the cecum and ascending colon but no corresponding lesion was seen on the CT scan.  She is completely asymptomatic so at this time we will continue to observe.    Discussion regarding healthcare directives.  On previous visit she told me that she will bring the health care directive for our records but she forgot so I told her to bring it on next visit.      Breast implant removal.  She tells me that she was planning to do breast implant removal because there was a recall on this.  Her surgery was scheduled for 9/24/2019.  Because of this new development of metastatic breast cancer and her recent radiation, surgery has been canceled.  We discussed that at this time treatment for  metastatic breast cancer would take precedence over the other surgery as the other surgery would require her to be off chemotherapy for several weeks and in that case the chances of cancer progression would be high and I would not recommend that.    Multiple sclerosis.  She will continue to follow with her neurologist Dr. Quiles.  Currently multiple sclerosis is under control and currently she is not taking any medications.    Return to clinic on 1/24/2023.    All of her questions were answered to her satisfaction.  She is agreeable and comfortable with the plan.    Dewayne Zepeda MD

## 2022-12-20 NOTE — LETTER
12/20/2022         RE: Shaneka Alvarez  [id number] [facility] [address]        Dear Colleague,    Thank you for referring your patient, Shaneka Alvarez, to the [facility]. Please see a copy of my visit note below.    Shaneka is a 62 year old who is being evaluated via a billable video visit.      Patient stated he is in the state of [place] for the visit today.    How would you like to obtain your AVS? MyChart  If the video visit is dropped, the invitation should be resent by: Text to cell phone: [phone]  Will anyone else be joining your video visit? Claudia Gomez, Virtual Visit Facilitator      Video-Visit Details    Video Start Time: 10:16 AM    Type of service:  Video Visit    Video End Time:10:21 AM    Originating Location (pt. Location): Home    Distant Location (provider location):  Off-site    Platform used for Video Visit: [facility]    ONCOLOGY FOLLOW UP NOTE: 12/20/22        I took the history from reviewing the previous notes that she was following with Dr. Amaya.  I have copied and updated from prior notes.    ONCOLOGY History:    1. January 2006:  Diagnosed with Stage IIB, T2 N1 M0 invasive lobular carcinoma of the right breast.  Final pathology showed a 4.5 x 3.8 x 2.5 cm, 01/14 lymph nodes positive.  Estrogen, progesterone receptor positive, HER-2 negative.     2.  Genetics.  BRCA1 and 2 mutations not detected. 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       THERAPY TO DATE:   1. 2006:  ECOG 2104 protocol of dose-dense Adriamycin, Cytoxan and Avastin x4 cycles followed by Taxol and Avastin x4 cycles.   2.  03/2007:  Completed 1 year Avastin.   3.  11/2006-01/2007:  Radiotherapy to the right breast of 5040 cGy.   4.  03/[id number]:  Aromasin.  Stopped after moving to the [facility].   5. 01/2016:  Right modified radical mastectomy with latissimus dorsi flap reconstruction and a left prophylactic mastectomy with latissimus dorsi flap  reconstruction.     She recently had MRI of the brain as a follow-up for multiple sclerosis and there were multiple calvarial lesions noted which was suspicious for metastatic disease.  There was no evidence of new multiple sclerosis lesions.  She had a PET scan on 8/30/2019 which showed widespread bone metastatic lesions (calvarium, spine, sacrum, pelvis, ribs most prominent at C7, T3, L1 and L4), hypermetabolic 3 cm lesion in LLL, and mediastinal/hilar LN. CEA wnl at 1.9 and C27-29 elevated to 415 (28 on 8/23/16). A CT guided biopsy of the right iliac bone was obtained on 9/4/19, pathology consistent with metastatic adenocarcinoma (breast), ER/OR negative, HER-2 negative PDL 1 < 1%. A second biopsy was take of the LLL nodule via EBUS on 9/5/19 did not show any malignancy.    C/T/L spine MRI 8/3/19 to 9/2/19 with numerous enhancing lesions of the C, T and L spine, largest around T9 and L1. No evidence of cord compression. Has a L1 compression fracture and impending L4.     Received radiation T12-L5 3000 cGy in 10 fractions from 9/6/2019 till 9/18/2019.  Neurosurgery recommended no surgical intervention but to wear [place] brace when out of bed and HOB >30 degrees    She started palliative Xeloda 2000/1500 on 10/1/2019 but after just a few doses she noticed nausea, red eyes blurred vision, loss of appetite.  She stopped taking it after 5 doses and was seen by nurse practitioner on 10/7/2019 when she was improving.  At that point she was restarted on Xeloda at a lower dose of 1000 mg twice a day.  As she was not able to tolerate even the lower dose with extreme nausea and vomiting and feeling extremely fatigued and blurry vision, we decided on stopping Xeloda.    We decided on repeating scans and MRI brain on 10/25/2019 showed no intracranial metastatic disease.   Multiple enhancing calvarial lesions may be increased in number and are suggestive of metastatic disease. Thinner imaging on the current study may identify  the smaller lesions and may be responsible for  identified more lesions.   There is a single focus of T2 hyperintense signal within the anterior right temporal lobe may represent interval demyelination. There is no evidence for active demyelination.    PET/CT on 11/1/2019 showed favorable response to therapy and Overall FDG uptake within scattered osseous metastases is decreased since 8/30/2019, particularly within the pelvis. Increased sclerotic appearance of L1 and L4 pathologic compression deformities, likely sequela of recently completed palliative radiation therapy.    No significant FDG uptake in previously demonstrated hypermetabolic mediastinal lymph nodes. Biopsy negative on 9/5/2019.  There is also decreased FDG uptake in the left lower lobe (biopsy negative for  malignancy), now with dense consolidation containing air bronchograms suggestive of infection versus aspiration.  There is diffuse FDG uptake within the esophagus, consistent with esophagitis.    Because of intolerance to Xeloda we decided on switching to weekly paclitaxel on 11/5/2019.    C#2 Taxol 12/4/19- started with 70mg/m2 dose reduction    C#3 Taxol 12/30/2019     PET/CT on 1/24/2020 showed progression of the disease in some of the bone lesions including increase in size and lytic lesion within the vertebral body of C4 measuring 1 cm as opposed to 0.6 cm previously and a new central soft tissue nodule with SUV max 4.1 when previously it was non-hypermetabolic.  There is also some progression noted in the right proximal femur and right iliac bone.  There is decreased metabolic uptake in the right lamina of T9 with SUV max 4.7 when previously it was 8.0.  Other lesions are stable.    CA 27-29 also had increased significantly and it was 257 on 1/28/2020.    We decided on switching to eribulin on 1/28/2020.    C#2 2/18/2020  C#2 D#8 2/25/2020    C#3 D#1 3/18/2020 -delayed by 1 week as per patient's preference because she wanted to visit her  family.    She had a repeat PET/CT on 3/27/2020 which showed stable size of the bone metastatic lesions although the FDG avidity was less, consistent with treatment response.    She had MRI of the thoracic spine on 4/3/2020 and it was compared to the one which was done in August 2019 and it showed increased size of several metastatic lesions most notably at T9 but also at T5-T7.  Other lesions at T10, T11 and T12 appeared improved.    CA 27-29 was also down to 222 on 3/18/2020.    Cycle #4 eribulin ( dose reduced to 1.2 mg/m2 )-4/7/2020-   Cycle #5 Eribulin ( 1.2 mg/m2 )- 4/28/2020   Cycle #6 Eribulin ( 1.2 mg/m2 )- 5/19/2020     Cycle #7 Eribulin ( 1.2 mg/m2 )- 6/9/2020    Cycle #8 Eribulin ( 1.2 mg/m2 )- 6/30/2020     She had a repeat PET scan on 7/17/2020 which showed incidental finding of new acute bilateral pulmonary embolism in the distal left main pulmonary artery extending in the left upper and lower lobes and in the segmental and branch arteries in the right lower lobe.    It also showed mildly increased FDG avidity in the lytic lesion in the T9 lamina and right pedicle with SUV max 5.3, previously 3.9.  That is also slightly increased FDG uptake to the left anterior fifth rib at the costochondral junction SUV max 3.9, previously 2.5.  There is slightly decreased FDG uptake in the right femoral neck lesion SUV max 2.2, previously 2.9.  FDG uptake in other bone lesions is fairly stable.  No new metastasis are seen.    CA 27-29 was 308 on 6/30/2020.  It was 286 on 5/19/2020.    Overall this is consistent with only slight progression versus stable disease.    She was started on Lovenox.    Cycle #9 eribulin 7/21/2020. CA 27-29 was 238    C#10 eribulin 8/18/2020. ( delayed by one week for ANC 0.9 )    C#11 Eribulin 9/8/2020    C#12 Eribulin 9/29/2020     C#13 Eribulin 10/20/2020     PET scan on 11/6/2020 shows progression of the disease with increased FDG uptake in the lytic lesions in the T9/T10 and right  posterolateral elements as well as increased FDG uptake in the previously known hypermetabolic right iliac bone lesion suggesting recurrence of previously treated metastasis.  There is new subtle lesion in the right manubrium.  There is also a new fracture in the anterolateral left fourth rib with associated uptake and this could be a pathologic fracture.  Healing fracture in the left anterior fifth rib lesion.  There is resolution of the left pulmonary emboli.  Decreased FDG uptake of the esophagus consistent with improving inflammation.    CA 27-29 on 10/20/2020 was also elevated at 489.    C#1 Trodelvy on 11/11/2020.  Cycle #2 Trodelvy 12/2/2020  Cycle number 2-day #8 Trodelvy 12/8/2020.    12/15/2020-12/16/2020.  She was admitted to the hospital with nausea vomiting and dehydration.  She had tachycardia and initial lactic acid of 5.  It decreased to 1.4 after 2 L of normal saline.  She also had hypokalemia and ANC was 1.3.  She was treated with IV fluids.  Procalcitonin was negative.  CTA of the chest was negative for pulmonary embolism or pneumonia.  No bacterial infection was documented.  Her medication which she was initially unable to tolerate at home, were restarted and she was discharged home once started to feel better.      We delayed the start of cycle number 3 x 1 week and decrease the dose of chemotherapy by 25%.  She also had neutropenia with ANC of 1.0.      She started cycle number 3-day #1 on 12/29/2020.  CA 27-29 was 392.    Cycle number 3-day #8 1/5/2021.    C#4 Trodelvy 1/20/2021- 75% dose    2/5/2021.  PET/CT overall shows a good response to treatment with improvement of previously hypermetabolic lesions in the spine and pelvis and stability of other bone meta stasis.  There is a single lytic lesion in the right iliac bone which demonstrates slight Yimi increased FDG uptake and has SUV max 4.7 while previously it was SUV max 3.4.  There was diffuse wall thickening of the esophagus with uptake  in the esophagus in the fundus of the stomach and this could be seen with inflammation.    Trodelvy cycle #5 - 2/10/2021.  75% of the dose.  CA 27-29 was 337.    Trodelvy cycle #6 - 3/3/2021.  75% of the dose.  Trodelvy cycle #6, D#8 - 3/10/2021.  75% of the dose.    Trodelvy C#8, D#8 on 4/21/2021  CA 27-29 stable at 371 on 4/14/2021    Trodelvy, cycle #9- 5/5/2021        On 2/25/2020 when she had biopsy of the right acetabulum and it was consistent with metastatic lobular carcinoma.  Again it was Triple Negative.     On 3/18/2020 when she also met with Dr. Arelis Arshad who also advised testing for androgen receptor studies on the biopsy specimen.  Tumor was diffusely androgen receptor positive.      5/26/2021-cycle #10 Trodelvy started    CA 27-29 was 545.    6/11/2021.  PET/CT shows mildly increased uptake in several bone lesions for example in the left anterior iliac crest, mid right iliac crest and left ischium.  Uptake in other bone lesions are similar to previously.    Because of minimal progression of the disease and she is tolerating chemotherapy very well, we decided on continuing the same chemotherapy.    6/16/2021-Trodelvy cycle #11    7/7/2021 -Trodelvy cycle #12    9/14/2021-Trodelvy-cycle #15, day #8.    9/8/2021-CA 27-29 was more elevated and it was 1176.    PET/CT on 9/24/2021 showed stable findings with no evidence of FDG avid metastatic disease within the body.  Left axillary lymph nodes are prominent and hypermetabolic and likely from recent vaccinations in the left deltoid muscle.  Healed bony metastasis seems stable.      Cycle #16, Trodelvy 9/28/2021.  Cycle #17, day #8 Trodelvy was on 10/26/2021.  Cycle #18, day #8 Trodelvy on 11/22/2021       She saw Dr. Mejia whom on 10/6/2021.  She was not considered eligible for Enfortumab trial.    Cycle #19, Trodelvy on 12/7/2021 12/21/2021.  PET/CT showed progression of bony metastatic disease.  There is increase hypermetabolism in both the right and  left iliac bones and the sternum.  Other bone lesions are stable.    There is new scattered areas of groundglass throughout both the lungs and these findings are indeterminate but may represent an infectious etiology.  Interval resolution of left axillary FDG avid lymphadenopathy.    She was started on Casodex 150 mg daily on 1/6/2022.        She was admitted to the hospital from 3/17/2022-3/19/2022 for vomiting and diarrhea and  severe back pain.  I reviewed the discharge summary.  CT lumbar spine showed a stable severe chronic L1 pathologic compression fracture.  Stable mild compression deformity of superior endplate of L3 and superior endplate of L4.  The upper margin of the L4 fracture extends slightly into the spinal canal without high-grade canal stenosis and this is also stable.  Nondisplaced fractures coursing through the L3 pedicles bilaterally were seen which were not clearly seen previously this could be acute and likely pathologic.  No extraspinal soft tissue abnormality.  Neurosurgery recommended conservative management.  Her pain was controlled and she was discharged home as she felt better with this.      3/31/2022-PET/CT shows progressive bone meta stasis with progressive FDG uptake in the following lesions. Left iliac crest lesion with max SUV of 7.2, previously 3.5. Right mid iliac crest lytic lesion shows maximum SUV of 7.8, previously 6.9.  T10 vertebral body mixed lytic and sclerotic lesion with an SUV of 7.1, previously not hypermetabolic. Thoracic vertebral bodies 8, 7, 5, and 4 also show hypermetabolic lesions were previously there was no hypermetabolism.  Some of the sclerotic osseous lesions are unchanged and do not show increased hypermetabolism compatible treated lesion such as a severe compression deformity at L1 as well as superior compression deformity at L4.  Medial right clavicle sclerotic lesion with SUV max of 4.8, previously not hypermetabolic. There is also right-sided sternal  lesions.      4/14/2022--C#1- switch to single agent Doxil 50 mg per metered squared every 4 weeks.    5/13/2022-cycle #2- Doxil- dose reduced to 40 mg per metered square due to severe nausea/vomiting and migraines requiring IV fluids and IV antiemetics.    6/10/2022-cycle #3-Doxil 40 mg per metered square    7/1/2022- PET/CT shows resolution of the hypermetabolic skeletal metastasis.    7/6/2022-cycle #4-Doxil 40 mg per metered square  8/5/2022- cycle #5-Doxil  9/1/2022.  Cycle #6-Doxil  9/30/2022- cycle #7-Doxil    10/18/2022.  PET/CT does not show evidence of any FDG activity    11/2/2022-cycle #8-Doxil.  11/30/2022-cycle #9-Doxil.      Interval history.  This is a video visit.    Overall she tells me that she has been tolerating chemotherapy very well with mild nausea.  This is more so related to the migraine headaches.  At times migraine headaches bothers her.  She is taking Aimovig every 30 days and takes Amerge as needed for migraine headaches.  Cancer related pain is under fair control with buprenorphine buccal film.  She has to take morphine only once over the last couple of months.  Bowel movements are fine.  No new swellings.  No infections or shortness of breath.  She has mild fatigue which is a stable.  She continues to take vitamin D and calcium.  Overall she is very pleased with how the treatments are going.  Neuropathy is at her baseline.       ECOG 1    ROS:  Rest of the comprehensive review of the system was negative.        I reviewed other history in epic as below.       PAST MEDICAL HISTORY:     1.  Breast cancer  2.  Multiple sclerosis.   3.  Depression.   4.  Migraines.   5.  Hypertension.   6.  Cholecystectomy and umbilical hernia repair.     SOCIAL HISTORY: She smoked for 5 years but quit many years ago.  Drinks alcohol socially.  She lives with her .  She is a teacher in middle school.       FAMILY HISTORY: She mentions that her mother had breast cancer at 49.  Both grandmothers had  breast cancer but she is not sure of the age.  A couple of cousins have cancers.  Patient has 3 children who are healthy.    Current Outpatient Medications   Medication     acetaminophen (TYLENOL) 500 MG tablet     Buprenorphine HCl (BELBUCA) 150 MCG FILM buccal film     busPIRone (BUSPAR) 15 MG tablet     calcium citrate-vitamin D (CITRACAL) 315-250 MG-UNIT TABS     Cholecalciferol (VITAMIN D3 PO)     ELIQUIS ANTICOAGULANT 5 MG tablet     erenumab-aooe (AIMOVIG) 70 MG/ML injection     ibuprofen (ADVIL/MOTRIN) 600 MG tablet     LORazepam (ATIVAN) 1 MG tablet     morphine (MSIR) 15 MG IR tablet     naratriptan (AMERGE) 2.5 MG tablet     propranolol ER (INDERAL LA) 80 MG 24 hr capsule     traZODone (DESYREL) 50 MG tablet     venlafaxine (EFFEXOR XR) 75 MG 24 hr capsule     vitamin B-2 (RIBOFLAVIN) 25 MG TABS tablet     No current facility-administered medications for this visit.        Allergies   Allergen Reactions     Bactrim [Sulfamethoxazole W/Trimethoprim]      Internal bleeding     Copaxone [Glatiramer] Hives          PHYSICAL EXAMINATION:     There were no vitals taken for this visit.  Wt Readings from Last 4 Encounters:   11/30/22 60.3 kg (133 lb)   11/18/22 58.5 kg (129 lb)   11/02/22 62.6 kg (138 lb)   10/26/22 61.7 kg (136 lb)       Constitutional.  Does not seem to be in any acute distress.  Eyes.  No redness or discharge noted.  Respiratory.  Speaking in full sentences.  Breathing seems comfortable without any accessory use of muscles.    Skin.  Visualized his skin does not show any obvious rashes.  Musculoskeletal.  Range of motion for visualized areas is intact.  Neurological.  Alert and oriented x3.  Psychiatric.  Mood, mentation and affect are normal.  Decision making capacity is intact.      The rest of a comprehensive physical examination is deferred due to Public Health Emergency video visit restrictions.        Labs and Imaging.    Reviewed.    11/30/2022   CBC shows WBC 2.6.  Hemoglobin 12.7.   Platelets 154.  ANC 1.4.  Lymphocyte 0.4.   Chemistry unremarkable except albumin 3.3.  CA 15-3 was 135.    9/30/2022  CA 15-3 was 159.  CA 27-29 was 1992 on 6/10/2022  CA 27-29 was 3370 on 5/13/2022.  It was 5307 on 4/14/2022 ferritin Doxil was started.      9/28/2021.      Iron studies, ferritin is 101, iron 51, TIBC 268 and iron saturation index 19%.    11/30/2022-x-ray of the ribs of the left side  There are a few sclerotic lesion seen in the right anterior ribs that were seen on previous PET CTs. These appear to involve the fourth and fifth ribs. There are likely more subtle lesions that were   described on the PET CT scan. Irregularity of the distal end of the left ninth rib may be from metastatic disease or fracture on the oblique view which likely is chronic.       10/18/2022-PET/CT showed no new suspicious FDG uptake within the skeleton.  Stable abnormal FDG avid lytic and sclerotic osseous lesions.  Mild diffuse FDG uptake throughout the large bowel without CT abnormality.    7/1/2022.  PET/CT shows diffuse sclerotic and lytic osseous lesions without any abnormal FDG uptake in the skeleton, consistent with treated disease.  There is evaluation of previous hypermetabolic bone lesions.  Multiple chronic rib fracture deformities and stable compression fracture deformities of T6, L1 and L4.  No suspicious FDG uptake in the chest abdomen or pelvis.    3/31/2022-PET/CT shows progressive bone metastasis with progressive FDG uptake in the following lesions. Left iliac crest lesion with max SUV of 7.2, previously 3.5. Right mid iliac crest lytic lesion shows maximum SUV of 7.8, previously 6.9.  T10 vertebral body mixed lytic and sclerotic lesion with an SUV of 7.1, previously not hypermetabolic. Thoracic vertebral bodies 8, 7, 5, and 4 also show hypermetabolic lesions were previously there was no hypermetabolism.  Some of the sclerotic osseous lesions are unchanged and do not show increased hypermetabolism  compatible treated lesion such as a severe compression deformity at L1 as well as superior compression deformity at L4.  Medial right clavicle sclerotic lesion with SUV max of 4.8, previously not hypermetabolic. There is also right-sided sternal lesions.      Biopsy from the right acetabulum shows metastatic lobular carcinoma.  It is triple negative.  Androgen receptor was diffusely positive (90%, moderate intensity) by immunohistochemistry. )  PD-L1 negative.    Foundation one showed CDH1 mutation (initially lobular cancer), CCND1 amplification ( equivocal ). FGF3, FGF4, FGF19 amplification ( Equivocal ). Most promising is CCND1 amplification with abemaciclib showing response in 4/10 patients. FGF3,FGF4 and FGF19 are targetable with pan-FGFR inhibitors.           ASSESSMENT AND PLAN:     Metastatic breast cancer negative breast cancer (androgen receptor positive ) with extensive involvement of the bones.  There is also a left lower lobe hypermetabolic lesion and mediastinal lymph nodes which are hypermetabolic.  The biopsy from the right iliac bone is consistent with metastatic lobular breast cancer but it is hormone receptor and HER-2 negative and PDL 1 is also negative.    Foundation 1 testing did not reveal any actionable mutations.    She was intolerant to Xeloda and progressed on Taxol and eribulin.      We then switched to recently FDA approved sacituzumab govitecan-hziy (Trodelvy) for TNBC, based on the results of the phase II IMMU-132-01 clinical trial.   She started this on 11/11/2020.      She obtained a second opinion from Dr. Castillo from [facility] who recommended a repeat biopsy to be done to make sure that she really has triple negative breast cancer.      She also met with Dr. Arelis Arshad on 3/18/2021.      After 10 cycles of Trodelvy, she had PET/CT on 6/11/2021 which showed mildly increased uptake in some of the bone lesions while stability in other bone lesions.        After 15  cycles, repeat PET scan showed stable findings.  CA 27-29 has been increasing and it is 1176.      As she was tolerating the chemotherapy well and her quality of life has been decent and scans were stable although she had a rising CA 27-29, we decided on continuing the same chemotherapy because it has been a very slow progression of the disease.    Now there is clear progression of the disease in the bones.  There is increased FDG uptake in both right and left iliac bones and the sternum.    Foundation one showed CDH1 mutation (initially lobular cancer), CCND1 amplification ( equivocal ). FGF3, FGF4, FGF19 amplification ( Equivocal ). Most promising is CCND1 amplification with abemaciclib showing response in 4/10 patients. FGF3,FGF4 and FGF19 are targetable with pan-FGFR inhibitor    As the tumor is diffusely positive for androgen receptor, we decided to start her on androgen receptor blockers.    I initially recommended enzalutamide 160 mg daily ( Enzalutamide for the Treatment of Androgen Receptor-Expressing Triple-Negative Breast Cancer----J Clin Oncol. 2018 Mar 20; 36(9): 884-890. ).    Eventually we decided to start her on Casodex 150 mg daily instead of Enzalutamide due to cost issues.  Clinical Trial Clin Cancer Res. 2013 Oct 1;19(19):5502-12.   Phase II trial of bicalutamide in patients with androgen receptor-positive, estrogen receptor-negative metastatic Breast Cancer  She started Casodex on 1/6/2022.    She had progressive disease in the bones on the PET scan on 3/31/2022.    We changed to single agent Doxil on 4/14/2022.      She developed significant nausea vomiting and headache so cycle #2 was given on 5/13/2022 with 20% dose reduction which she tolerated well.    Cycle #3 was given on 6/10/2022 with the same dose reduced Doxil.    Repeat PET/CT on 7/1/2022 shows resolution of the FDG avid bony metastasis consistent with treated disease.  No suspicious FDG uptake was seen in the chest abdomen and  pelvis.  CA 27-29 was also coming down.     Repeat PET/CT in October 2022 after completing 7 cycles continued to show normal new FDG uptake.  CA 15-3 was trending down     She has completed 9 cycles of Doxil and the last one was on 11/30/2022.  CA 15-3 was down to 135.    She is tolerating the chemotherapy very well and the plan is to continue with the same.  She will be due for cycle #10 on 12/28/2022.    Baseline echocardiogram on 4/7/2022 was unremarkable with EF 60 to 65%.    Bone disease.  She has metastatic disease to the bone.  Previously she got palliative radiation to T12-L5 3000 cGy in 10 fractions from 9/6/2019 till 9/18/2019.  She was evaluated by orthopedics Dr. Bipin Elliott on 11/1/2019 and conservative management was recommended.    She was on Zometa every 3 months. She last received on 9/29/2020.  Because of progression of the disease in the bones, we switched to Xgeva which she received on 11/11/2020.    She had progression of the disease in the bones so we switched to Doxil but we continued xgeva.  PET scan on 7/1/2022 does not show any suspicious FDG uptake in the bones consistent with treated disease.  Repeat PET/CT on 10/18/2022 also does not show any FDG avid lesions in the skeleton.  Currently her pain is well controlled with the current pain regimen.  She will continue calcium vitamin D.  We will continue to give her Xgeva.      Leukopenia/neutropenia.  She has mild neutropenia from the chemotherapy.  No infections.  Continue to observe.    Pain management.  She is following with palliative care and remains a stable on buprenorphine buccal film.  She mentioned that she has taken as needed morphine only once over the last 2 months.     Migraines. She is on monthly Aimovig injections and takes Amerge as needed for migraines.  Lately it has been bothering her more.  She will follow with neurology.      Nausea.  Overall mild.  She can take as needed antinausea medications.      Bilateral  pulmonary emboli.  Continue Eliquis indefinitely      We did not address the following today.      Shingles.  The rash has dried out.  She completed well with acyclovir and prednisone.  She takes gabapentin for postherpetic neuralgia and is doing better.        Constipation. Continue senna.    Neuropathy.  This remains mild and stable with neuropathy in her hands.    This is in addition to the chronic balance issues and heat and cold sensitivity from underlying multiple sclerosis which is also stable for years.  Continue to monitor.     Subcutaneous nodule in the scalp.  This looks like an epidermoid cyst.  She thinks it is a stable.  Continue to monitor.       Insomnia.  Continue trazodone.      Wall thickening of esophagus and FDG uptake of fundus of the stomach.  It could be in the setting of inflammation from recent nausea and vomiting.  Symptoms have resolved.  Continue to monitor clinically.    Vision changes.    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  It was thought that this could be due to Taxol as patient reported to the ophthalmologist that she was on Taxol in September 2019 when her symptoms started.  But she was not on Taxol in September 2019 when she started noticing the visual changes so Taxol is not responsible for this.  The first dose of Taxol was on 11/5/2019.   She had left cataract extraction on 2/8/2021 and she has noticed significant improvement in her vision.       Increased FDG ability in the colon.  She had FDG uptake in the cecum and ascending colon but no corresponding lesion was seen on the CT scan.  She is completely asymptomatic so at this time we will continue to observe.    Discussion regarding healthcare directives.  On previous visit she told me that she will bring the health care directive for our records but she forgot so I told her to bring it on next visit.      Breast implant removal.  She tells me that she was planning to do breast  implant removal because there was a recall on this.  Her surgery was scheduled for 9/24/2019.  Because of this new development of metastatic breast cancer and her recent radiation, surgery has been canceled.  We discussed that at this time treatment for metastatic breast cancer would take precedence over the other surgery as the other surgery would require her to be off chemotherapy for several weeks and in that case the chances of cancer progression would be high and I would not recommend that.    Multiple sclerosis.  She will continue to follow with her neurologist Dr. Quiles.  Currently multiple sclerosis is under control and currently she is not taking any medications.    Return to clinic on 1/24/2023.    All of her questions were answered to her satisfaction.  She is agreeable and comfortable with the plan.    Dewayne Zepeda MD                Again, thank you for allowing me to participate in the care of your patient.        Sincerely,        Dewayne Zepeda MD

## 2022-12-20 NOTE — NURSING NOTE
Patient verified medications and allergies are correct via eCheck-in. Patient confirms no changes at this time and/or since last reviewed by clinic staff.    Eliza Gomez, Virtual Facilitator

## 2022-12-28 ENCOUNTER — INFUSION THERAPY VISIT (OUTPATIENT)
Dept: INFUSION THERAPY | Facility: CLINIC | Age: 62
End: 2022-12-28
Payer: MEDICARE

## 2022-12-28 ENCOUNTER — LAB (OUTPATIENT)
Dept: ONCOLOGY | Facility: CLINIC | Age: 62
End: 2022-12-28
Payer: MEDICARE

## 2022-12-28 VITALS
OXYGEN SATURATION: 100 % | TEMPERATURE: 98.1 F | RESPIRATION RATE: 18 BRPM | BODY MASS INDEX: 25.34 KG/M2 | SYSTOLIC BLOOD PRESSURE: 124 MMHG | HEART RATE: 58 BPM | DIASTOLIC BLOOD PRESSURE: 83 MMHG | WEIGHT: 136.8 LBS

## 2022-12-28 DIAGNOSIS — E87.6 HYPOKALEMIA: ICD-10-CM

## 2022-12-28 DIAGNOSIS — E87.6 HYPOKALEMIA: Primary | ICD-10-CM

## 2022-12-28 DIAGNOSIS — C50.912 BILATERAL MALIGNANT NEOPLASM OF BREAST IN FEMALE, UNSPECIFIED ESTROGEN RECEPTOR STATUS, UNSPECIFIED SITE OF BREAST (H): ICD-10-CM

## 2022-12-28 DIAGNOSIS — D70.1 CHEMOTHERAPY-INDUCED NEUTROPENIA (H): ICD-10-CM

## 2022-12-28 DIAGNOSIS — T45.1X5A CHEMOTHERAPY-INDUCED NEUTROPENIA (H): ICD-10-CM

## 2022-12-28 DIAGNOSIS — C50.919 METASTATIC BREAST CANCER: ICD-10-CM

## 2022-12-28 DIAGNOSIS — C79.51 BONE METASTASES: Primary | ICD-10-CM

## 2022-12-28 DIAGNOSIS — C50.911 BILATERAL MALIGNANT NEOPLASM OF BREAST IN FEMALE, UNSPECIFIED ESTROGEN RECEPTOR STATUS, UNSPECIFIED SITE OF BREAST (H): ICD-10-CM

## 2022-12-28 LAB
ALBUMIN SERPL-MCNC: 3.3 G/DL (ref 3.4–5)
ALP SERPL-CCNC: 62 U/L (ref 40–150)
ALT SERPL W P-5'-P-CCNC: 35 U/L (ref 0–50)
ANION GAP SERPL CALCULATED.3IONS-SCNC: 5 MMOL/L (ref 3–14)
AST SERPL W P-5'-P-CCNC: 41 U/L (ref 0–45)
BASOPHILS # BLD AUTO: 0 10E3/UL (ref 0–0.2)
BASOPHILS NFR BLD AUTO: 1 %
BILIRUB SERPL-MCNC: 0.4 MG/DL (ref 0.2–1.3)
BUN SERPL-MCNC: 9 MG/DL (ref 7–30)
CALCIUM SERPL-MCNC: 8.9 MG/DL (ref 8.5–10.1)
CANCER AG15-3 SERPL-ACNC: 118 U/ML
CHLORIDE BLD-SCNC: 105 MMOL/L (ref 94–109)
CO2 SERPL-SCNC: 28 MMOL/L (ref 20–32)
CREAT SERPL-MCNC: 0.64 MG/DL (ref 0.52–1.04)
EOSINOPHIL # BLD AUTO: 0 10E3/UL (ref 0–0.7)
EOSINOPHIL NFR BLD AUTO: 2 %
ERYTHROCYTE [DISTWIDTH] IN BLOOD BY AUTOMATED COUNT: 15.8 % (ref 10–15)
GFR SERPL CREATININE-BSD FRML MDRD: >90 ML/MIN/1.73M2
GLUCOSE BLD-MCNC: 112 MG/DL (ref 70–99)
HCT VFR BLD AUTO: 37.5 % (ref 35–47)
HGB BLD-MCNC: 12.4 G/DL (ref 11.7–15.7)
IMM GRANULOCYTES # BLD: 0 10E3/UL
IMM GRANULOCYTES NFR BLD: 1 %
LYMPHOCYTES # BLD AUTO: 0.5 10E3/UL (ref 0.8–5.3)
LYMPHOCYTES NFR BLD AUTO: 21 %
MCH RBC QN AUTO: 29 PG (ref 26.5–33)
MCHC RBC AUTO-ENTMCNC: 33.1 G/DL (ref 31.5–36.5)
MCV RBC AUTO: 88 FL (ref 78–100)
MONOCYTES # BLD AUTO: 0.6 10E3/UL (ref 0–1.3)
MONOCYTES NFR BLD AUTO: 26 %
NEUTROPHILS # BLD AUTO: 1.2 10E3/UL (ref 1.6–8.3)
NEUTROPHILS NFR BLD AUTO: 49 %
NRBC # BLD AUTO: 0 10E3/UL
NRBC BLD AUTO-RTO: 0 /100
PLAT MORPH BLD: NORMAL
PLATELET # BLD AUTO: 193 10E3/UL (ref 150–450)
POTASSIUM BLD-SCNC: 4.8 MMOL/L (ref 3.4–5.3)
PROT SERPL-MCNC: 6.5 G/DL (ref 6.8–8.8)
RBC # BLD AUTO: 4.28 10E6/UL (ref 3.8–5.2)
RBC MORPH BLD: NORMAL
SODIUM SERPL-SCNC: 138 MMOL/L (ref 133–144)
WBC # BLD AUTO: 2.4 10E3/UL (ref 4–11)

## 2022-12-28 PROCEDURE — 86300 IMMUNOASSAY TUMOR CA 15-3: CPT | Performed by: INTERNAL MEDICINE

## 2022-12-28 PROCEDURE — 85025 COMPLETE CBC W/AUTO DIFF WBC: CPT | Performed by: INTERNAL MEDICINE

## 2022-12-28 PROCEDURE — 99207 PR NO CHARGE LOS: CPT

## 2022-12-28 PROCEDURE — 96372 THER/PROPH/DIAG INJ SC/IM: CPT | Mod: 59 | Performed by: NURSE PRACTITIONER

## 2022-12-28 PROCEDURE — 96413 CHEMO IV INFUSION 1 HR: CPT | Performed by: NURSE PRACTITIONER

## 2022-12-28 PROCEDURE — 96367 TX/PROPH/DG ADDL SEQ IV INF: CPT | Performed by: NURSE PRACTITIONER

## 2022-12-28 PROCEDURE — 80053 COMPREHEN METABOLIC PANEL: CPT | Performed by: INTERNAL MEDICINE

## 2022-12-28 RX ORDER — HEPARIN SODIUM (PORCINE) LOCK FLUSH IV SOLN 100 UNIT/ML 100 UNIT/ML
5 SOLUTION INTRAVENOUS
Status: DISCONTINUED | OUTPATIENT
Start: 2022-12-28 | End: 2022-12-28 | Stop reason: HOSPADM

## 2022-12-28 RX ORDER — HEPARIN SODIUM (PORCINE) LOCK FLUSH IV SOLN 100 UNIT/ML 100 UNIT/ML
500 SOLUTION INTRAVENOUS ONCE
Status: COMPLETED | OUTPATIENT
Start: 2022-12-28 | End: 2022-12-28

## 2022-12-28 RX ADMIN — HEPARIN SODIUM (PORCINE) LOCK FLUSH IV SOLN 100 UNIT/ML 5 ML: 100 SOLUTION at 14:24

## 2022-12-28 RX ADMIN — HEPARIN SODIUM (PORCINE) LOCK FLUSH IV SOLN 100 UNIT/ML 500 UNITS: 100 SOLUTION at 11:27

## 2022-12-28 NOTE — PROGRESS NOTES
Infusion Nursing Note:  Shaneka Alvarez presents today for C10D1 Doxil + Xgeva.    Patient seen by provider today: No   present during visit today: Not Applicable.    Note: Patient expressed that her back pain has been a little worse the last couple days, she did need to take a dose of her Morphine. Otherwise no new medical concerns noted.     Intravenous Access:  Implanted Port.    Treatment Conditions:  Lab Results   Component Value Date    HGB 12.4 12/28/2022    WBC 2.4 (L) 12/28/2022    ANEU 1.9 10/26/2021    ANEUTAUTO 1.2 (L) 12/28/2022     12/28/2022      Lab Results   Component Value Date     12/28/2022    POTASSIUM 4.8 12/28/2022    MAG 2.0 04/21/2022    CR 0.64 12/28/2022    RAFAELA 8.9 12/28/2022    BILITOTAL 0.4 12/28/2022    ALBUMIN 3.3 (L) 12/28/2022    ALT 35 12/28/2022    AST 41 12/28/2022     Results reviewed, labs MET treatment parameters, ok to proceed with treatment.    Post Infusion Assessment:  Patient tolerated infusion without incident.  Blood return noted pre and post infusion.  Site patent and intact, free from redness, edema or discomfort.  No evidence of extravasations.  Access discontinued per protocol.     Discharge Plan:   AVS to patient via MYCHART.  Patient will return 1/25/23 for next appointment.   Patient discharged in stable condition accompanied by: self.  Departure Mode: Ambulatory.      Halima Bullock RN

## 2022-12-28 NOTE — PROGRESS NOTES
See IV flow sheet for details of port access. Labs sent: cbc, cmp, ca15-3. IVAD needle left in place for chemotherapy to follow.    Keira Brooks RN

## 2023-01-01 ENCOUNTER — VIRTUAL VISIT (OUTPATIENT)
Dept: NEUROLOGY | Facility: CLINIC | Age: 63
End: 2023-01-01
Payer: COMMERCIAL

## 2023-01-01 ENCOUNTER — APPOINTMENT (OUTPATIENT)
Dept: CT IMAGING | Facility: CLINIC | Age: 63
End: 2023-01-01
Attending: STUDENT IN AN ORGANIZED HEALTH CARE EDUCATION/TRAINING PROGRAM
Payer: COMMERCIAL

## 2023-01-01 ENCOUNTER — LAB (OUTPATIENT)
Dept: ONCOLOGY | Facility: CLINIC | Age: 63
End: 2023-01-01
Payer: COMMERCIAL

## 2023-01-01 ENCOUNTER — VIRTUAL VISIT (OUTPATIENT)
Dept: ONCOLOGY | Facility: CLINIC | Age: 63
End: 2023-01-01
Attending: INTERNAL MEDICINE
Payer: COMMERCIAL

## 2023-01-01 ENCOUNTER — TELEPHONE (OUTPATIENT)
Dept: SURGERY | Facility: CLINIC | Age: 63
End: 2023-01-01

## 2023-01-01 ENCOUNTER — HOSPITAL ENCOUNTER (OUTPATIENT)
Dept: PET IMAGING | Facility: CLINIC | Age: 63
Discharge: HOME | End: 2023-08-08
Attending: INTERNAL MEDICINE | Admitting: INTERNAL MEDICINE
Payer: COMMERCIAL

## 2023-01-01 ENCOUNTER — HOSPITAL ENCOUNTER (OUTPATIENT)
Dept: PET IMAGING | Facility: CLINIC | Age: 63
Discharge: HOME | End: 2023-11-10
Attending: INTERNAL MEDICINE | Admitting: INTERNAL MEDICINE
Payer: COMMERCIAL

## 2023-01-01 ENCOUNTER — TELEPHONE (OUTPATIENT)
Dept: NEUROLOGY | Facility: CLINIC | Age: 63
End: 2023-01-01

## 2023-01-01 ENCOUNTER — THERAPY VISIT (OUTPATIENT)
Dept: PHYSICAL THERAPY | Facility: CLINIC | Age: 63
End: 2023-01-01
Payer: COMMERCIAL

## 2023-01-01 ENCOUNTER — INFUSION THERAPY VISIT (OUTPATIENT)
Dept: INFUSION THERAPY | Facility: CLINIC | Age: 63
End: 2023-01-01
Attending: INTERNAL MEDICINE
Payer: COMMERCIAL

## 2023-01-01 ENCOUNTER — MYC MEDICAL ADVICE (OUTPATIENT)
Dept: ONCOLOGY | Facility: CLINIC | Age: 63
End: 2023-01-01
Payer: COMMERCIAL

## 2023-01-01 ENCOUNTER — APPOINTMENT (OUTPATIENT)
Dept: GENERAL RADIOLOGY | Facility: CLINIC | Age: 63
End: 2023-01-01
Attending: STUDENT IN AN ORGANIZED HEALTH CARE EDUCATION/TRAINING PROGRAM
Payer: COMMERCIAL

## 2023-01-01 ENCOUNTER — VIRTUAL VISIT (OUTPATIENT)
Dept: ONCOLOGY | Facility: CLINIC | Age: 63
End: 2023-01-01
Attending: HOSPITALIST
Payer: COMMERCIAL

## 2023-01-01 ENCOUNTER — APPOINTMENT (OUTPATIENT)
Dept: NEUROLOGY | Facility: CLINIC | Age: 63
End: 2023-01-01
Payer: COMMERCIAL

## 2023-01-01 ENCOUNTER — ONCOLOGY VISIT (OUTPATIENT)
Dept: ONCOLOGY | Facility: CLINIC | Age: 63
End: 2023-01-01
Payer: COMMERCIAL

## 2023-01-01 ENCOUNTER — APPOINTMENT (OUTPATIENT)
Dept: OCCUPATIONAL THERAPY | Facility: CLINIC | Age: 63
End: 2023-01-01
Payer: COMMERCIAL

## 2023-01-01 ENCOUNTER — APPOINTMENT (OUTPATIENT)
Dept: PHYSICAL THERAPY | Facility: CLINIC | Age: 63
End: 2023-01-01
Attending: NURSE PRACTITIONER
Payer: COMMERCIAL

## 2023-01-01 ENCOUNTER — OFFICE VISIT (OUTPATIENT)
Dept: SURGERY | Facility: CLINIC | Age: 63
End: 2023-01-01
Payer: COMMERCIAL

## 2023-01-01 ENCOUNTER — ALLIED HEALTH/NURSE VISIT (OUTPATIENT)
Dept: NEUROLOGY | Facility: CLINIC | Age: 63
End: 2023-01-01

## 2023-01-01 ENCOUNTER — MYC MEDICAL ADVICE (OUTPATIENT)
Dept: FAMILY MEDICINE | Facility: OTHER | Age: 63
End: 2023-01-01
Payer: COMMERCIAL

## 2023-01-01 ENCOUNTER — PATIENT OUTREACH (OUTPATIENT)
Dept: CARE COORDINATION | Facility: CLINIC | Age: 63
End: 2023-01-01
Payer: COMMERCIAL

## 2023-01-01 ENCOUNTER — HOSPITAL ENCOUNTER (OUTPATIENT)
Dept: MRI IMAGING | Facility: CLINIC | Age: 63
Discharge: HOME | End: 2023-07-06
Attending: NURSE PRACTITIONER | Admitting: NURSE PRACTITIONER
Payer: COMMERCIAL

## 2023-01-01 ENCOUNTER — APPOINTMENT (OUTPATIENT)
Dept: CT IMAGING | Facility: CLINIC | Age: 63
End: 2023-01-01
Attending: EMERGENCY MEDICINE
Payer: COMMERCIAL

## 2023-01-01 ENCOUNTER — HOSPITAL ENCOUNTER (OUTPATIENT)
Facility: AMBULATORY SURGERY CENTER | Age: 63
Discharge: HOME | End: 2023-10-13
Attending: ANESTHESIOLOGY | Admitting: ANESTHESIOLOGY
Payer: COMMERCIAL

## 2023-01-01 ENCOUNTER — MYC MEDICAL ADVICE (OUTPATIENT)
Dept: NEUROLOGY | Facility: CLINIC | Age: 63
End: 2023-01-01
Payer: COMMERCIAL

## 2023-01-01 ENCOUNTER — INFUSION THERAPY VISIT (OUTPATIENT)
Dept: INFUSION THERAPY | Facility: CLINIC | Age: 63
End: 2023-01-01
Payer: COMMERCIAL

## 2023-01-01 ENCOUNTER — VIRTUAL VISIT (OUTPATIENT)
Dept: ONCOLOGY | Facility: CLINIC | Age: 63
End: 2023-01-01
Payer: COMMERCIAL

## 2023-01-01 ENCOUNTER — HOSPITAL ENCOUNTER (EMERGENCY)
Facility: CLINIC | Age: 63
Discharge: HOME | End: 2023-08-28
Attending: STUDENT IN AN ORGANIZED HEALTH CARE EDUCATION/TRAINING PROGRAM | Admitting: STUDENT IN AN ORGANIZED HEALTH CARE EDUCATION/TRAINING PROGRAM
Payer: COMMERCIAL

## 2023-01-01 ENCOUNTER — TELEPHONE (OUTPATIENT)
Dept: NEUROSURGERY | Facility: CLINIC | Age: 63
End: 2023-01-01
Payer: COMMERCIAL

## 2023-01-01 ENCOUNTER — HOSPITAL ENCOUNTER (EMERGENCY)
Facility: CLINIC | Age: 63
Discharge: HOME | End: 2023-07-03
Attending: EMERGENCY MEDICINE | Admitting: EMERGENCY MEDICINE
Payer: COMMERCIAL

## 2023-01-01 ENCOUNTER — ANCILLARY PROCEDURE (OUTPATIENT)
Dept: GENERAL RADIOLOGY | Facility: CLINIC | Age: 63
End: 2023-01-01
Attending: NURSE PRACTITIONER
Payer: COMMERCIAL

## 2023-01-01 ENCOUNTER — HOSPITAL ENCOUNTER (OUTPATIENT)
Facility: CLINIC | Age: 63
Setting detail: OBSERVATION
Discharge: HOME | End: 2023-08-01
Attending: EMERGENCY MEDICINE | Admitting: PEDIATRICS
Payer: COMMERCIAL

## 2023-01-01 ENCOUNTER — NURSE TRIAGE (OUTPATIENT)
Dept: NURSING | Facility: CLINIC | Age: 63
End: 2023-01-01
Payer: COMMERCIAL

## 2023-01-01 ENCOUNTER — TELEPHONE (OUTPATIENT)
Dept: PALLIATIVE CARE | Facility: CLINIC | Age: 63
End: 2023-01-01
Payer: COMMERCIAL

## 2023-01-01 ENCOUNTER — MYC REFILL (OUTPATIENT)
Dept: ONCOLOGY | Facility: CLINIC | Age: 63
End: 2023-01-01
Payer: COMMERCIAL

## 2023-01-01 ENCOUNTER — MYC REFILL (OUTPATIENT)
Dept: FAMILY MEDICINE | Facility: OTHER | Age: 63
End: 2023-01-01

## 2023-01-01 ENCOUNTER — TELEPHONE (OUTPATIENT)
Dept: PALLIATIVE MEDICINE | Facility: CLINIC | Age: 63
End: 2023-01-01

## 2023-01-01 ENCOUNTER — TELEPHONE (OUTPATIENT)
Dept: ONCOLOGY | Facility: CLINIC | Age: 63
End: 2023-01-01
Payer: COMMERCIAL

## 2023-01-01 ENCOUNTER — TELEPHONE (OUTPATIENT)
Dept: NEUROLOGY | Facility: CLINIC | Age: 63
End: 2023-01-01
Payer: COMMERCIAL

## 2023-01-01 ENCOUNTER — OFFICE VISIT (OUTPATIENT)
Dept: NEUROLOGY | Facility: CLINIC | Age: 63
End: 2023-01-01
Payer: COMMERCIAL

## 2023-01-01 ENCOUNTER — OFFICE VISIT (OUTPATIENT)
Dept: ANESTHESIOLOGY | Facility: CLINIC | Age: 63
End: 2023-01-01
Attending: HOSPITALIST
Payer: COMMERCIAL

## 2023-01-01 ENCOUNTER — ONCOLOGY VISIT (OUTPATIENT)
Dept: ONCOLOGY | Facility: CLINIC | Age: 63
End: 2023-01-01
Attending: NURSE PRACTITIONER
Payer: COMMERCIAL

## 2023-01-01 ENCOUNTER — PATIENT OUTREACH (OUTPATIENT)
Dept: ONCOLOGY | Facility: CLINIC | Age: 63
End: 2023-01-01

## 2023-01-01 ENCOUNTER — APPOINTMENT (OUTPATIENT)
Dept: MRI IMAGING | Facility: CLINIC | Age: 63
End: 2023-01-01
Attending: EMERGENCY MEDICINE
Payer: COMMERCIAL

## 2023-01-01 ENCOUNTER — MYC MEDICAL ADVICE (OUTPATIENT)
Dept: ONCOLOGY | Facility: CLINIC | Age: 63
End: 2023-01-01

## 2023-01-01 VITALS
TEMPERATURE: 98.1 F | RESPIRATION RATE: 18 BRPM | OXYGEN SATURATION: 100 % | SYSTOLIC BLOOD PRESSURE: 139 MMHG | BODY MASS INDEX: 24.74 KG/M2 | WEIGHT: 135.3 LBS | DIASTOLIC BLOOD PRESSURE: 90 MMHG | HEART RATE: 60 BPM

## 2023-01-01 VITALS
BODY MASS INDEX: 22.63 KG/M2 | DIASTOLIC BLOOD PRESSURE: 102 MMHG | HEART RATE: 93 BPM | HEIGHT: 62 IN | SYSTOLIC BLOOD PRESSURE: 144 MMHG | OXYGEN SATURATION: 98 % | TEMPERATURE: 97.5 F | WEIGHT: 123 LBS

## 2023-01-01 VITALS
DIASTOLIC BLOOD PRESSURE: 85 MMHG | OXYGEN SATURATION: 97 % | HEIGHT: 62 IN | BODY MASS INDEX: 23.96 KG/M2 | TEMPERATURE: 97.6 F | RESPIRATION RATE: 16 BRPM | WEIGHT: 130.2 LBS | SYSTOLIC BLOOD PRESSURE: 124 MMHG | HEART RATE: 64 BPM

## 2023-01-01 VITALS
SYSTOLIC BLOOD PRESSURE: 127 MMHG | RESPIRATION RATE: 20 BRPM | HEART RATE: 61 BPM | OXYGEN SATURATION: 99 % | DIASTOLIC BLOOD PRESSURE: 80 MMHG | TEMPERATURE: 98.1 F | WEIGHT: 128.09 LBS | BODY MASS INDEX: 23.57 KG/M2 | HEIGHT: 62 IN

## 2023-01-01 VITALS
WEIGHT: 124.5 LBS | DIASTOLIC BLOOD PRESSURE: 79 MMHG | SYSTOLIC BLOOD PRESSURE: 124 MMHG | HEART RATE: 64 BPM | TEMPERATURE: 98.2 F | BODY MASS INDEX: 22.77 KG/M2 | OXYGEN SATURATION: 100 % | RESPIRATION RATE: 18 BRPM

## 2023-01-01 VITALS
HEART RATE: 71 BPM | RESPIRATION RATE: 20 BRPM | WEIGHT: 131 LBS | HEIGHT: 62 IN | SYSTOLIC BLOOD PRESSURE: 118 MMHG | OXYGEN SATURATION: 95 % | TEMPERATURE: 98.1 F | BODY MASS INDEX: 24.11 KG/M2 | DIASTOLIC BLOOD PRESSURE: 81 MMHG

## 2023-01-01 VITALS
RESPIRATION RATE: 16 BRPM | OXYGEN SATURATION: 98 % | HEART RATE: 53 BPM | TEMPERATURE: 98 F | BODY MASS INDEX: 23.56 KG/M2 | SYSTOLIC BLOOD PRESSURE: 143 MMHG | WEIGHT: 128.8 LBS | DIASTOLIC BLOOD PRESSURE: 82 MMHG

## 2023-01-01 VITALS — SYSTOLIC BLOOD PRESSURE: 128 MMHG | OXYGEN SATURATION: 98 % | HEART RATE: 103 BPM | DIASTOLIC BLOOD PRESSURE: 94 MMHG

## 2023-01-01 VITALS
BODY MASS INDEX: 24.03 KG/M2 | RESPIRATION RATE: 16 BRPM | WEIGHT: 131.4 LBS | TEMPERATURE: 98 F | OXYGEN SATURATION: 96 % | DIASTOLIC BLOOD PRESSURE: 89 MMHG | SYSTOLIC BLOOD PRESSURE: 135 MMHG | HEART RATE: 66 BPM

## 2023-01-01 VITALS
SYSTOLIC BLOOD PRESSURE: 139 MMHG | HEART RATE: 63 BPM | BODY MASS INDEX: 23.97 KG/M2 | TEMPERATURE: 98.1 F | RESPIRATION RATE: 18 BRPM | WEIGHT: 131.1 LBS | OXYGEN SATURATION: 99 % | DIASTOLIC BLOOD PRESSURE: 94 MMHG

## 2023-01-01 VITALS — WEIGHT: 129.9 LBS | BODY MASS INDEX: 23.9 KG/M2 | HEIGHT: 62 IN

## 2023-01-01 VITALS — HEIGHT: 62 IN | BODY MASS INDEX: 23 KG/M2 | WEIGHT: 125 LBS

## 2023-01-01 VITALS
SYSTOLIC BLOOD PRESSURE: 119 MMHG | BODY MASS INDEX: 23.37 KG/M2 | DIASTOLIC BLOOD PRESSURE: 82 MMHG | WEIGHT: 127 LBS | HEIGHT: 62 IN

## 2023-01-01 VITALS
SYSTOLIC BLOOD PRESSURE: 153 MMHG | TEMPERATURE: 97.9 F | OXYGEN SATURATION: 100 % | BODY MASS INDEX: 23.19 KG/M2 | DIASTOLIC BLOOD PRESSURE: 94 MMHG | WEIGHT: 126 LBS | HEART RATE: 60 BPM | HEIGHT: 62 IN

## 2023-01-01 VITALS
BODY MASS INDEX: 23.89 KG/M2 | DIASTOLIC BLOOD PRESSURE: 84 MMHG | TEMPERATURE: 98 F | HEART RATE: 61 BPM | SYSTOLIC BLOOD PRESSURE: 117 MMHG | RESPIRATION RATE: 16 BRPM | OXYGEN SATURATION: 100 % | WEIGHT: 130.6 LBS

## 2023-01-01 VITALS
WEIGHT: 137.4 LBS | OXYGEN SATURATION: 100 % | RESPIRATION RATE: 14 BRPM | DIASTOLIC BLOOD PRESSURE: 91 MMHG | SYSTOLIC BLOOD PRESSURE: 139 MMHG | BODY MASS INDEX: 25.12 KG/M2 | TEMPERATURE: 98 F | HEART RATE: 71 BPM

## 2023-01-01 VITALS
HEIGHT: 62 IN | SYSTOLIC BLOOD PRESSURE: 119 MMHG | DIASTOLIC BLOOD PRESSURE: 82 MMHG | WEIGHT: 131 LBS | BODY MASS INDEX: 24.11 KG/M2 | OXYGEN SATURATION: 99 % | HEART RATE: 66 BPM

## 2023-01-01 VITALS
SYSTOLIC BLOOD PRESSURE: 131 MMHG | HEIGHT: 62 IN | DIASTOLIC BLOOD PRESSURE: 92 MMHG | BODY MASS INDEX: 23 KG/M2 | HEART RATE: 60 BPM | OXYGEN SATURATION: 100 % | WEIGHT: 125 LBS | TEMPERATURE: 97.5 F | RESPIRATION RATE: 15 BRPM

## 2023-01-01 VITALS — WEIGHT: 125 LBS | HEIGHT: 62 IN | BODY MASS INDEX: 23 KG/M2

## 2023-01-01 VITALS
TEMPERATURE: 97.9 F | DIASTOLIC BLOOD PRESSURE: 84 MMHG | SYSTOLIC BLOOD PRESSURE: 134 MMHG | RESPIRATION RATE: 18 BRPM | WEIGHT: 125 LBS | OXYGEN SATURATION: 98 % | BODY MASS INDEX: 23 KG/M2 | HEART RATE: 63 BPM | HEIGHT: 62 IN

## 2023-01-01 VITALS — HEIGHT: 62 IN | WEIGHT: 118 LBS | BODY MASS INDEX: 21.71 KG/M2

## 2023-01-01 VITALS
OXYGEN SATURATION: 100 % | WEIGHT: 125 LBS | DIASTOLIC BLOOD PRESSURE: 83 MMHG | BODY MASS INDEX: 22.86 KG/M2 | HEART RATE: 61 BPM | SYSTOLIC BLOOD PRESSURE: 135 MMHG

## 2023-01-01 VITALS
DIASTOLIC BLOOD PRESSURE: 75 MMHG | RESPIRATION RATE: 18 BRPM | SYSTOLIC BLOOD PRESSURE: 127 MMHG | BODY MASS INDEX: 23.5 KG/M2 | TEMPERATURE: 98.1 F | OXYGEN SATURATION: 100 % | HEART RATE: 70 BPM | WEIGHT: 128.5 LBS

## 2023-01-01 DIAGNOSIS — H60.392 INFECTION OF LEFT EARLOBE: ICD-10-CM

## 2023-01-01 DIAGNOSIS — C50.912 BILATERAL MALIGNANT NEOPLASM OF BREAST IN FEMALE, UNSPECIFIED ESTROGEN RECEPTOR STATUS, UNSPECIFIED SITE OF BREAST (H): Primary | ICD-10-CM

## 2023-01-01 DIAGNOSIS — E87.6 HYPOKALEMIA: ICD-10-CM

## 2023-01-01 DIAGNOSIS — T45.1X5A CHEMOTHERAPY-INDUCED NEUTROPENIA (H): ICD-10-CM

## 2023-01-01 DIAGNOSIS — C50.912 BILATERAL MALIGNANT NEOPLASM OF BREAST IN FEMALE, UNSPECIFIED ESTROGEN RECEPTOR STATUS, UNSPECIFIED SITE OF BREAST (H): ICD-10-CM

## 2023-01-01 DIAGNOSIS — C79.51 MALIGNANT NEOPLASM METASTATIC TO BONE (H): ICD-10-CM

## 2023-01-01 DIAGNOSIS — T45.1X5A CHEMOTHERAPY-INDUCED NEUTROPENIA (H): Primary | ICD-10-CM

## 2023-01-01 DIAGNOSIS — R61 ABNORMAL FLUSHING AND SWEATING: ICD-10-CM

## 2023-01-01 DIAGNOSIS — C79.51 MALIGNANT NEOPLASM METASTATIC TO BONE (H): Primary | ICD-10-CM

## 2023-01-01 DIAGNOSIS — C50.919 PRIMARY MALIGNANT NEOPLASM OF BREAST WITH METASTASIS (H): Primary | ICD-10-CM

## 2023-01-01 DIAGNOSIS — G43.111 INTRACTABLE MIGRAINE WITH AURA WITH STATUS MIGRAINOSUS: ICD-10-CM

## 2023-01-01 DIAGNOSIS — C50.911 BILATERAL MALIGNANT NEOPLASM OF BREAST IN FEMALE, UNSPECIFIED ESTROGEN RECEPTOR STATUS, UNSPECIFIED SITE OF BREAST (H): ICD-10-CM

## 2023-01-01 DIAGNOSIS — M62.81 GENERALIZED MUSCLE WEAKNESS: ICD-10-CM

## 2023-01-01 DIAGNOSIS — D70.1 CHEMOTHERAPY-INDUCED NEUTROPENIA (H): ICD-10-CM

## 2023-01-01 DIAGNOSIS — M53.3 SACROILIAC JOINT PAIN: ICD-10-CM

## 2023-01-01 DIAGNOSIS — G43.111 INTRACTABLE MIGRAINE WITH AURA WITH STATUS MIGRAINOSUS: Primary | ICD-10-CM

## 2023-01-01 DIAGNOSIS — F41.9 ANXIETY: ICD-10-CM

## 2023-01-01 DIAGNOSIS — C50.911 BILATERAL MALIGNANT NEOPLASM OF BREAST IN FEMALE, UNSPECIFIED ESTROGEN RECEPTOR STATUS, UNSPECIFIED SITE OF BREAST (H): Primary | ICD-10-CM

## 2023-01-01 DIAGNOSIS — R23.2 ABNORMAL FLUSHING AND SWEATING: ICD-10-CM

## 2023-01-01 DIAGNOSIS — C50.919 PRIMARY MALIGNANT NEOPLASM OF BREAST WITH METASTASIS (H): ICD-10-CM
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
## 2023-01-01 DIAGNOSIS — M53.3 SI (SACROILIAC) JOINT DYSFUNCTION: Primary | ICD-10-CM

## 2023-01-01 DIAGNOSIS — M54.50 CHRONIC BILATERAL LOW BACK PAIN WITHOUT SCIATICA: ICD-10-CM

## 2023-01-01 DIAGNOSIS — M54.50 BILATERAL LOW BACK PAIN WITHOUT SCIATICA, UNSPECIFIED CHRONICITY: ICD-10-CM

## 2023-01-01 DIAGNOSIS — F33.1 MAJOR DEPRESSIVE DISORDER, RECURRENT EPISODE, MODERATE (H): ICD-10-CM

## 2023-01-01 DIAGNOSIS — G89.29 CHRONIC BILATERAL LOW BACK PAIN WITHOUT SCIATICA: ICD-10-CM

## 2023-01-01 DIAGNOSIS — L08.9 LOCAL INFECTION OF SKIN AND SUBCUTANEOUS TISSUE: Primary | ICD-10-CM

## 2023-01-01 DIAGNOSIS — R30.0 DYSURIA: Primary | ICD-10-CM

## 2023-01-01 DIAGNOSIS — D72.829 LEUKOCYTOSIS, UNSPECIFIED TYPE: ICD-10-CM

## 2023-01-01 DIAGNOSIS — M46.1 BILATERAL SACROILIITIS (H): Primary | ICD-10-CM

## 2023-01-01 DIAGNOSIS — G43.009 MIGRAINE WITHOUT AURA AND WITHOUT STATUS MIGRAINOSUS, NOT INTRACTABLE: ICD-10-CM

## 2023-01-01 DIAGNOSIS — D70.1 CHEMOTHERAPY-INDUCED NEUTROPENIA (H): Primary | ICD-10-CM

## 2023-01-01 DIAGNOSIS — F43.23 ADJUSTMENT REACTION WITH ANXIETY AND DEPRESSION: ICD-10-CM

## 2023-01-01 DIAGNOSIS — G47.00 INSOMNIA, UNSPECIFIED TYPE: ICD-10-CM

## 2023-01-01 DIAGNOSIS — G89.3 CANCER ASSOCIATED PAIN: ICD-10-CM

## 2023-01-01 DIAGNOSIS — G43.901 MIGRAINE WITH STATUS MIGRAINOSUS, NOT INTRACTABLE, UNSPECIFIED MIGRAINE TYPE: ICD-10-CM

## 2023-01-01 DIAGNOSIS — M53.3 SACROILIAC JOINT PAIN: Primary | ICD-10-CM

## 2023-01-01 DIAGNOSIS — G43.009 MIGRAINE WITHOUT AURA AND WITHOUT STATUS MIGRAINOSUS, NOT INTRACTABLE: Primary | ICD-10-CM

## 2023-01-01 DIAGNOSIS — S80.211A ABRASION OF RIGHT KNEE, INITIAL ENCOUNTER: ICD-10-CM

## 2023-01-01 DIAGNOSIS — G43.709 CHRONIC MIGRAINE WITHOUT AURA WITHOUT STATUS MIGRAINOSUS, NOT INTRACTABLE: Primary | ICD-10-CM

## 2023-01-01 DIAGNOSIS — R42 VERTIGO: ICD-10-CM

## 2023-01-01 DIAGNOSIS — R22.1 NECK MASS: Primary | ICD-10-CM

## 2023-01-01 DIAGNOSIS — R51.9 WORSENING HEADACHES: Primary | ICD-10-CM

## 2023-01-01 DIAGNOSIS — R11.2 CHEMOTHERAPY INDUCED NAUSEA AND VOMITING: Primary | ICD-10-CM

## 2023-01-01 DIAGNOSIS — R51.9 WORSENING HEADACHES: ICD-10-CM

## 2023-01-01 DIAGNOSIS — H11.31 SUBCONJUNCTIVAL HEMORRHAGE OF RIGHT EYE: ICD-10-CM

## 2023-01-01 DIAGNOSIS — E87.6 HYPOKALEMIA: Primary | ICD-10-CM

## 2023-01-01 DIAGNOSIS — T45.1X5A CHEMOTHERAPY INDUCED NAUSEA AND VOMITING: Primary | ICD-10-CM

## 2023-01-01 DIAGNOSIS — Z85.3 HISTORY OF BREAST CANCER: ICD-10-CM

## 2023-01-01 DIAGNOSIS — R22.1 NECK MASS: ICD-10-CM

## 2023-01-01 DIAGNOSIS — I26.99 ACUTE PULMONARY EMBOLISM WITHOUT ACUTE COR PULMONALE, UNSPECIFIED PULMONARY EMBOLISM TYPE (H): ICD-10-CM

## 2023-01-01 DIAGNOSIS — R52 INTRACTABLE PAIN: ICD-10-CM

## 2023-01-01 DIAGNOSIS — H35.373 EPIRETINAL MEMBRANE (ERM) OF BOTH EYES: Primary | ICD-10-CM

## 2023-01-01 LAB
ABO/RH(D): NORMAL
ALBUMIN SERPL BCG-MCNC: 3.4 G/DL (ref 3.5–5.2)
ALBUMIN SERPL BCG-MCNC: 3.6 G/DL (ref 3.5–5.2)
ALBUMIN SERPL BCG-MCNC: 3.7 G/DL (ref 3.5–5.2)
ALBUMIN SERPL BCG-MCNC: 3.8 G/DL (ref 3.5–5.2)
ALBUMIN SERPL BCG-MCNC: 3.9 G/DL (ref 3.5–5.2)
ALBUMIN SERPL BCG-MCNC: 4 G/DL (ref 3.5–5.2)
ALBUMIN SERPL BCG-MCNC: 4.1 G/DL (ref 3.5–5.2)
ALBUMIN SERPL BCG-MCNC: 4.5 G/DL (ref 3.5–5.2)
ALBUMIN SERPL-MCNC: 3.1 G/DL (ref 3.4–5)
ALBUMIN SERPL-MCNC: 3.4 G/DL (ref 3.4–5)
ALBUMIN UR-MCNC: 50 MG/DL
ALBUMIN UR-MCNC: NEGATIVE MG/DL
ALP SERPL-CCNC: 101 U/L (ref 35–104)
ALP SERPL-CCNC: 108 U/L (ref 35–104)
ALP SERPL-CCNC: 127 U/L (ref 35–104)
ALP SERPL-CCNC: 159 U/L (ref 35–104)
ALP SERPL-CCNC: 67 U/L (ref 35–104)
ALP SERPL-CCNC: 67 U/L (ref 35–104)
ALP SERPL-CCNC: 69 U/L (ref 35–104)
ALP SERPL-CCNC: 69 U/L (ref 40–150)
ALP SERPL-CCNC: 73 U/L (ref 40–150)
ALP SERPL-CCNC: 74 U/L (ref 35–104)
ALP SERPL-CCNC: 80 U/L (ref 40–150)
ALP SERPL-CCNC: 81 U/L (ref 35–104)
ALP SERPL-CCNC: 84 U/L (ref 40–150)
ALP SERPL-CCNC: 99 U/L (ref 35–104)
ALT SERPL W P-5'-P-CCNC: 12 U/L (ref 0–50)
ALT SERPL W P-5'-P-CCNC: 14 U/L (ref 0–50)
ALT SERPL W P-5'-P-CCNC: 15 U/L (ref 0–50)
ALT SERPL W P-5'-P-CCNC: 16 U/L (ref 0–50)
ALT SERPL W P-5'-P-CCNC: 16 U/L (ref 0–50)
ALT SERPL W P-5'-P-CCNC: 17 U/L (ref 0–50)
ALT SERPL W P-5'-P-CCNC: 18 U/L (ref 0–50)
ALT SERPL W P-5'-P-CCNC: 21 U/L (ref 0–50)
ALT SERPL W P-5'-P-CCNC: 22 U/L (ref 0–50)
ALT SERPL W P-5'-P-CCNC: 25 U/L (ref 0–50)
ANION GAP SERPL CALCULATED.3IONS-SCNC: 10 MMOL/L (ref 7–15)
ANION GAP SERPL CALCULATED.3IONS-SCNC: 10 MMOL/L (ref 7–15)
ANION GAP SERPL CALCULATED.3IONS-SCNC: 11 MMOL/L (ref 7–15)
ANION GAP SERPL CALCULATED.3IONS-SCNC: 15 MMOL/L (ref 7–15)
ANION GAP SERPL CALCULATED.3IONS-SCNC: 2 MMOL/L (ref 3–14)
ANION GAP SERPL CALCULATED.3IONS-SCNC: 4 MMOL/L (ref 3–14)
ANION GAP SERPL CALCULATED.3IONS-SCNC: 7 MMOL/L (ref 7–15)
ANION GAP SERPL CALCULATED.3IONS-SCNC: 8 MMOL/L (ref 7–15)
ANION GAP SERPL CALCULATED.3IONS-SCNC: 9 MMOL/L (ref 7–15)
ANTIBODY SCREEN: NEGATIVE
APPEARANCE UR: ABNORMAL
APPEARANCE UR: CLEAR
APTT PPP: 54 SECONDS (ref 22–38)
AST SERPL W P-5'-P-CCNC: 20 U/L (ref 0–45)
AST SERPL W P-5'-P-CCNC: 22 U/L (ref 0–45)
AST SERPL W P-5'-P-CCNC: 22 U/L (ref 0–45)
AST SERPL W P-5'-P-CCNC: 25 U/L (ref 0–45)
AST SERPL W P-5'-P-CCNC: 26 U/L (ref 0–45)
AST SERPL W P-5'-P-CCNC: 27 U/L (ref 0–45)
AST SERPL W P-5'-P-CCNC: 28 U/L (ref 0–45)
AST SERPL W P-5'-P-CCNC: 28 U/L (ref 0–45)
AST SERPL W P-5'-P-CCNC: 29 U/L (ref 0–45)
AST SERPL W P-5'-P-CCNC: 32 U/L (ref 0–45)
AST SERPL W P-5'-P-CCNC: 32 U/L (ref 0–45)
AST SERPL W P-5'-P-CCNC: 33 U/L (ref 0–45)
AST SERPL W P-5'-P-CCNC: 34 U/L (ref 0–45)
AST SERPL W P-5'-P-CCNC: 36 U/L (ref 0–45)
BASO+EOS+MONOS # BLD AUTO: ABNORMAL 10*3/UL
BASO+EOS+MONOS NFR BLD AUTO: ABNORMAL %
BASOPHILS # BLD AUTO: 0 10E3/UL (ref 0–0.2)
BASOPHILS # BLD AUTO: 0.1 10E3/UL (ref 0–0.2)
BASOPHILS # BLD AUTO: 0.2 10E3/UL (ref 0–0.2)
BASOPHILS # BLD MANUAL: 0 10E3/UL (ref 0–0.2)
BASOPHILS NFR BLD AUTO: 1 %
BASOPHILS NFR BLD AUTO: 2 %
BASOPHILS NFR BLD MANUAL: 0 %
BILIRUB DIRECT SERPL-MCNC: <0.2 MG/DL (ref 0–0.3)
BILIRUB SERPL-MCNC: 0.2 MG/DL
BILIRUB SERPL-MCNC: 0.2 MG/DL
BILIRUB SERPL-MCNC: 0.2 MG/DL (ref 0.2–1.3)
BILIRUB SERPL-MCNC: 0.3 MG/DL
BILIRUB SERPL-MCNC: 0.3 MG/DL (ref 0.2–1.3)
BILIRUB SERPL-MCNC: 0.5 MG/DL
BILIRUB SERPL-MCNC: <0.2 MG/DL
BILIRUB UR QL STRIP: ABNORMAL
BILIRUB UR QL STRIP: ABNORMAL
BUN SERPL-MCNC: 10 MG/DL (ref 7–30)
BUN SERPL-MCNC: 10.2 MG/DL (ref 8–23)
BUN SERPL-MCNC: 10.3 MG/DL (ref 8–23)
BUN SERPL-MCNC: 10.5 MG/DL (ref 8–23)
BUN SERPL-MCNC: 4 MG/DL (ref 7–30)
BUN SERPL-MCNC: 5.2 MG/DL (ref 8–23)
BUN SERPL-MCNC: 5.5 MG/DL (ref 8–23)
BUN SERPL-MCNC: 6.3 MG/DL (ref 8–23)
BUN SERPL-MCNC: 6.4 MG/DL (ref 8–23)
BUN SERPL-MCNC: 7.1 MG/DL (ref 8–23)
BUN SERPL-MCNC: 7.8 MG/DL (ref 8–23)
BUN SERPL-MCNC: 8.5 MG/DL (ref 8–23)
BUN SERPL-MCNC: 8.8 MG/DL (ref 8–23)
BUN SERPL-MCNC: 8.9 MG/DL (ref 8–23)
C PNEUM DNA SPEC QL NAA+PROBE: NOT DETECTED
CALCIUM SERPL-MCNC: 8 MG/DL (ref 8.5–10.1)
CALCIUM SERPL-MCNC: 8.3 MG/DL (ref 8.8–10.2)
CALCIUM SERPL-MCNC: 8.3 MG/DL (ref 8.8–10.2)
CALCIUM SERPL-MCNC: 8.6 MG/DL (ref 8.8–10.2)
CALCIUM SERPL-MCNC: 8.7 MG/DL (ref 8.5–10.1)
CALCIUM SERPL-MCNC: 8.7 MG/DL (ref 8.8–10.2)
CALCIUM SERPL-MCNC: 8.8 MG/DL (ref 8.8–10.2)
CALCIUM SERPL-MCNC: 8.9 MG/DL (ref 8.8–10.2)
CALCIUM SERPL-MCNC: 9 MG/DL (ref 8.8–10.2)
CALCIUM SERPL-MCNC: 9 MG/DL (ref 8.8–10.2)
CALCIUM SERPL-MCNC: 9.1 MG/DL (ref 8.8–10.2)
CALCIUM SERPL-MCNC: 9.5 MG/DL (ref 8.8–10.2)
CANCER AG15-3 SERPL-ACNC: 115 U/ML
CANCER AG15-3 SERPL-ACNC: 161 U/ML
CANCER AG15-3 SERPL-ACNC: 177 U/ML
CANCER AG15-3 SERPL-ACNC: 186 U/ML
CANCER AG15-3 SERPL-ACNC: 193 U/ML
CANCER AG15-3 SERPL-ACNC: 237 U/ML
CANCER AG15-3 SERPL-ACNC: 242 U/ML
CANCER AG15-3 SERPL-ACNC: 287 U/ML
CANCER AG15-3 SERPL-ACNC: 408 U/ML
CHLORIDE BLD-SCNC: 105 MMOL/L (ref 94–109)
CHLORIDE BLD-SCNC: 107 MMOL/L (ref 94–109)
CHLORIDE SERPL-SCNC: 100 MMOL/L (ref 98–107)
CHLORIDE SERPL-SCNC: 100 MMOL/L (ref 98–107)
CHLORIDE SERPL-SCNC: 101 MMOL/L (ref 98–107)
CHLORIDE SERPL-SCNC: 101 MMOL/L (ref 98–107)
CHLORIDE SERPL-SCNC: 102 MMOL/L (ref 98–107)
CHLORIDE SERPL-SCNC: 102 MMOL/L (ref 98–107)
CHLORIDE SERPL-SCNC: 103 MMOL/L (ref 98–107)
CHLORIDE SERPL-SCNC: 103 MMOL/L (ref 98–107)
CHLORIDE SERPL-SCNC: 106 MMOL/L (ref 98–107)
CHLORIDE SERPL-SCNC: 98 MMOL/L (ref 98–107)
CHLORIDE SERPL-SCNC: 99 MMOL/L (ref 98–107)
CHLORIDE SERPL-SCNC: 99 MMOL/L (ref 98–107)
CO2 SERPL-SCNC: 28 MMOL/L (ref 20–32)
CO2 SERPL-SCNC: 30 MMOL/L (ref 20–32)
COLOR UR AUTO: ABNORMAL
COLOR UR AUTO: ABNORMAL
CREAT SERPL-MCNC: 0.58 MG/DL (ref 0.51–0.95)
CREAT SERPL-MCNC: 0.59 MG/DL (ref 0.51–0.95)
CREAT SERPL-MCNC: 0.6 MG/DL (ref 0.51–0.95)
CREAT SERPL-MCNC: 0.63 MG/DL (ref 0.52–1.04)
CREAT SERPL-MCNC: 0.65 MG/DL (ref 0.51–0.95)
CREAT SERPL-MCNC: 0.68 MG/DL (ref 0.51–0.95)
CREAT SERPL-MCNC: 0.68 MG/DL (ref 0.52–1.04)
CREAT SERPL-MCNC: 0.7 MG/DL (ref 0.51–0.95)
CREAT SERPL-MCNC: 0.75 MG/DL (ref 0.51–0.95)
CREAT SERPL-MCNC: 0.77 MG/DL (ref 0.51–0.95)
CRP SERPL-MCNC: <3 MG/L
DEPRECATED HCO3 PLAS-SCNC: 23 MMOL/L (ref 22–29)
DEPRECATED HCO3 PLAS-SCNC: 25 MMOL/L (ref 22–29)
DEPRECATED HCO3 PLAS-SCNC: 25 MMOL/L (ref 22–29)
DEPRECATED HCO3 PLAS-SCNC: 26 MMOL/L (ref 22–29)
DEPRECATED HCO3 PLAS-SCNC: 27 MMOL/L (ref 22–29)
DEPRECATED HCO3 PLAS-SCNC: 29 MMOL/L (ref 22–29)
EGFRCR SERPLBLD CKD-EPI 2021: 87 ML/MIN/1.73M2
EGFRCR SERPLBLD CKD-EPI 2021: 90 ML/MIN/1.73M2
EGFRCR SERPLBLD CKD-EPI 2021: 90 ML/MIN/1.73M2
EGFRCR SERPLBLD CKD-EPI 2021: >90 ML/MIN/1.73M2
EOSINOPHIL # BLD AUTO: 0.1 10E3/UL (ref 0–0.7)
EOSINOPHIL # BLD AUTO: 0.2 10E3/UL (ref 0–0.7)
EOSINOPHIL # BLD MANUAL: 0 10E3/UL (ref 0–0.7)
EOSINOPHIL NFR BLD AUTO: 0 %
EOSINOPHIL NFR BLD AUTO: 1 %
EOSINOPHIL NFR BLD AUTO: 2 %
EOSINOPHIL NFR BLD AUTO: 3 %
EOSINOPHIL NFR BLD MANUAL: 0 %
ERYTHROCYTE [DISTWIDTH] IN BLOOD BY AUTOMATED COUNT: 13.9 % (ref 10–15)
ERYTHROCYTE [DISTWIDTH] IN BLOOD BY AUTOMATED COUNT: 14 % (ref 10–15)
ERYTHROCYTE [DISTWIDTH] IN BLOOD BY AUTOMATED COUNT: 14.1 % (ref 10–15)
ERYTHROCYTE [DISTWIDTH] IN BLOOD BY AUTOMATED COUNT: 14.3 % (ref 10–15)
ERYTHROCYTE [DISTWIDTH] IN BLOOD BY AUTOMATED COUNT: 14.3 % (ref 10–15)
ERYTHROCYTE [DISTWIDTH] IN BLOOD BY AUTOMATED COUNT: 14.6 % (ref 10–15)
ERYTHROCYTE [DISTWIDTH] IN BLOOD BY AUTOMATED COUNT: 14.7 % (ref 10–15)
ERYTHROCYTE [DISTWIDTH] IN BLOOD BY AUTOMATED COUNT: 14.8 % (ref 10–15)
ERYTHROCYTE [DISTWIDTH] IN BLOOD BY AUTOMATED COUNT: 14.9 % (ref 10–15)
ERYTHROCYTE [SEDIMENTATION RATE] IN BLOOD BY WESTERGREN METHOD: 7 MM/HR (ref 0–30)
ETHANOL SERPL-MCNC: <0.01 G/DL
FLUAV H1 2009 PAND RNA SPEC QL NAA+PROBE: NOT DETECTED
FLUAV H1 RNA SPEC QL NAA+PROBE: NOT DETECTED
FLUAV H3 RNA SPEC QL NAA+PROBE: NOT DETECTED
FLUAV RNA SPEC QL NAA+PROBE: NOT DETECTED
FLUBV RNA SPEC QL NAA+PROBE: NOT DETECTED
GFR SERPL CREATININE-BSD FRML MDRD: 90 ML/MIN/1.73M2
GFR SERPL CREATININE-BSD FRML MDRD: >90 ML/MIN/1.73M2
GGT SERPL-CCNC: 26 U/L (ref 5–36)
GLUCOSE BLD-MCNC: 107 MG/DL (ref 70–99)
GLUCOSE BLD-MCNC: 116 MG/DL (ref 70–99)
GLUCOSE SERPL-MCNC: 102 MG/DL (ref 70–99)
GLUCOSE SERPL-MCNC: 105 MG/DL (ref 70–99)
GLUCOSE SERPL-MCNC: 109 MG/DL (ref 70–99)
GLUCOSE SERPL-MCNC: 110 MG/DL (ref 70–99)
GLUCOSE SERPL-MCNC: 110 MG/DL (ref 70–99)
GLUCOSE SERPL-MCNC: 114 MG/DL (ref 70–99)
GLUCOSE SERPL-MCNC: 123 MG/DL (ref 70–99)
GLUCOSE SERPL-MCNC: 83 MG/DL (ref 70–99)
GLUCOSE SERPL-MCNC: 89 MG/DL (ref 70–99)
GLUCOSE SERPL-MCNC: 90 MG/DL (ref 70–99)
GLUCOSE SERPL-MCNC: 94 MG/DL (ref 70–99)
GLUCOSE SERPL-MCNC: 96 MG/DL (ref 70–99)
GLUCOSE UR STRIP-MCNC: NEGATIVE MG/DL
GLUCOSE UR STRIP-MCNC: NEGATIVE MG/DL
GRAN CASTS #/AREA URNS LPF: ABNORMAL /LPF
HADV DNA SPEC QL NAA+PROBE: NOT DETECTED
HCOV PNL SPEC NAA+PROBE: NOT DETECTED
HCT VFR BLD AUTO: 34.1 % (ref 35–47)
HCT VFR BLD AUTO: 34.6 % (ref 35–47)
HCT VFR BLD AUTO: 35.5 % (ref 35–47)
HCT VFR BLD AUTO: 35.6 % (ref 35–47)
HCT VFR BLD AUTO: 35.7 % (ref 35–47)
HCT VFR BLD AUTO: 35.8 % (ref 35–47)
HCT VFR BLD AUTO: 36.3 % (ref 35–47)
HCT VFR BLD AUTO: 36.9 % (ref 35–47)
HCT VFR BLD AUTO: 37.5 % (ref 35–47)
HCT VFR BLD AUTO: 37.5 % (ref 35–47)
HCT VFR BLD AUTO: 38.1 % (ref 35–47)
HCT VFR BLD AUTO: 38.3 % (ref 35–47)
HCT VFR BLD AUTO: 40.2 % (ref 35–47)
HCT VFR BLD AUTO: 42.1 % (ref 35–47)
HCT VFR BLD AUTO: 43.1 % (ref 35–47)
HGB BLD-MCNC: 11.1 G/DL (ref 11.7–15.7)
HGB BLD-MCNC: 11.1 G/DL (ref 11.7–15.7)
HGB BLD-MCNC: 11.6 G/DL (ref 11.7–15.7)
HGB BLD-MCNC: 11.7 G/DL (ref 11.7–15.7)
HGB BLD-MCNC: 11.7 G/DL (ref 11.7–15.7)
HGB BLD-MCNC: 12 G/DL (ref 11.7–15.7)
HGB BLD-MCNC: 12.1 G/DL (ref 11.7–15.7)
HGB BLD-MCNC: 12.1 G/DL (ref 11.7–15.7)
HGB BLD-MCNC: 12.3 G/DL (ref 11.7–15.7)
HGB BLD-MCNC: 12.3 G/DL (ref 11.7–15.7)
HGB BLD-MCNC: 12.7 G/DL (ref 11.7–15.7)
HGB BLD-MCNC: 12.8 G/DL (ref 11.7–15.7)
HGB BLD-MCNC: 13.4 G/DL (ref 11.7–15.7)
HGB BLD-MCNC: 14.2 G/DL (ref 11.7–15.7)
HGB BLD-MCNC: 14.5 G/DL (ref 11.7–15.7)
HGB UR QL STRIP: NEGATIVE
HGB UR QL STRIP: NEGATIVE
HMPV RNA SPEC QL NAA+PROBE: NOT DETECTED
HPIV1 RNA SPEC QL NAA+PROBE: NOT DETECTED
HPIV2 RNA SPEC QL NAA+PROBE: NOT DETECTED
HPIV3 RNA SPEC QL NAA+PROBE: NOT DETECTED
HPIV4 RNA SPEC QL NAA+PROBE: NOT DETECTED
IMM GRANULOCYTES # BLD: 0 10E3/UL
IMM GRANULOCYTES # BLD: 0.1 10E3/UL
IMM GRANULOCYTES # BLD: 0.9 10E3/UL
IMM GRANULOCYTES NFR BLD: 0 %
IMM GRANULOCYTES NFR BLD: 0 %
IMM GRANULOCYTES NFR BLD: 1 %
IMM GRANULOCYTES NFR BLD: 4 %
INR PPP: 1.2 (ref 0.85–1.15)
KETONES UR STRIP-MCNC: 150 MG/DL
KETONES UR STRIP-MCNC: NEGATIVE MG/DL
LEUKOCYTE ESTERASE UR QL STRIP: NEGATIVE
LEUKOCYTE ESTERASE UR QL STRIP: NEGATIVE
LYMPHOCYTES # BLD AUTO: 0.4 10E3/UL (ref 0.8–5.3)
LYMPHOCYTES # BLD AUTO: 0.5 10E3/UL (ref 0.8–5.3)
LYMPHOCYTES # BLD AUTO: 0.6 10E3/UL (ref 0.8–5.3)
LYMPHOCYTES # BLD AUTO: 0.6 10E3/UL (ref 0.8–5.3)
LYMPHOCYTES # BLD AUTO: 0.8 10E3/UL (ref 0.8–5.3)
LYMPHOCYTES # BLD AUTO: 1 10E3/UL (ref 0.8–5.3)
LYMPHOCYTES # BLD MANUAL: 1.2 10E3/UL (ref 0.8–5.3)
LYMPHOCYTES NFR BLD AUTO: 10 %
LYMPHOCYTES NFR BLD AUTO: 11 %
LYMPHOCYTES NFR BLD AUTO: 12 %
LYMPHOCYTES NFR BLD AUTO: 12 %
LYMPHOCYTES NFR BLD AUTO: 13 %
LYMPHOCYTES NFR BLD AUTO: 14 %
LYMPHOCYTES NFR BLD AUTO: 14 %
LYMPHOCYTES NFR BLD AUTO: 15 %
LYMPHOCYTES NFR BLD AUTO: 4 %
LYMPHOCYTES NFR BLD AUTO: 6 %
LYMPHOCYTES NFR BLD AUTO: 6 %
LYMPHOCYTES NFR BLD AUTO: 8 %
LYMPHOCYTES NFR BLD AUTO: 8 %
LYMPHOCYTES NFR BLD MANUAL: 5 %
M PNEUMO DNA SPEC QL NAA+PROBE: NOT DETECTED
MCH RBC QN AUTO: 28.7 PG (ref 26.5–33)
MCH RBC QN AUTO: 29.1 PG (ref 26.5–33)
MCH RBC QN AUTO: 29.1 PG (ref 26.5–33)
MCH RBC QN AUTO: 29.4 PG (ref 26.5–33)
MCH RBC QN AUTO: 29.4 PG (ref 26.5–33)
MCH RBC QN AUTO: 29.5 PG (ref 26.5–33)
MCH RBC QN AUTO: 29.6 PG (ref 26.5–33)
MCH RBC QN AUTO: 29.7 PG (ref 26.5–33)
MCH RBC QN AUTO: 29.8 PG (ref 26.5–33)
MCH RBC QN AUTO: 29.9 PG (ref 26.5–33)
MCH RBC QN AUTO: 30.2 PG (ref 26.5–33)
MCH RBC QN AUTO: 30.3 PG (ref 26.5–33)
MCH RBC QN AUTO: 30.3 PG (ref 26.5–33)
MCHC RBC AUTO-ENTMCNC: 32.1 G/DL (ref 31.5–36.5)
MCHC RBC AUTO-ENTMCNC: 32.3 G/DL (ref 31.5–36.5)
MCHC RBC AUTO-ENTMCNC: 32.5 G/DL (ref 31.5–36.5)
MCHC RBC AUTO-ENTMCNC: 32.6 G/DL (ref 31.5–36.5)
MCHC RBC AUTO-ENTMCNC: 32.7 G/DL (ref 31.5–36.5)
MCHC RBC AUTO-ENTMCNC: 32.8 G/DL (ref 31.5–36.5)
MCHC RBC AUTO-ENTMCNC: 33 G/DL (ref 31.5–36.5)
MCHC RBC AUTO-ENTMCNC: 33.3 G/DL (ref 31.5–36.5)
MCHC RBC AUTO-ENTMCNC: 33.4 G/DL (ref 31.5–36.5)
MCHC RBC AUTO-ENTMCNC: 33.6 G/DL (ref 31.5–36.5)
MCHC RBC AUTO-ENTMCNC: 33.7 G/DL (ref 31.5–36.5)
MCHC RBC AUTO-ENTMCNC: 33.7 G/DL (ref 31.5–36.5)
MCV RBC AUTO: 88 FL (ref 78–100)
MCV RBC AUTO: 89 FL (ref 78–100)
MCV RBC AUTO: 90 FL (ref 78–100)
MCV RBC AUTO: 91 FL (ref 78–100)
MONOCYTES # BLD AUTO: 0.3 10E3/UL (ref 0–1.3)
MONOCYTES # BLD AUTO: 0.4 10E3/UL (ref 0–1.3)
MONOCYTES # BLD AUTO: 0.4 10E3/UL (ref 0–1.3)
MONOCYTES # BLD AUTO: 0.7 10E3/UL (ref 0–1.3)
MONOCYTES # BLD AUTO: 0.9 10E3/UL (ref 0–1.3)
MONOCYTES # BLD AUTO: 0.9 10E3/UL (ref 0–1.3)
MONOCYTES # BLD AUTO: 1.7 10E3/UL (ref 0–1.3)
MONOCYTES # BLD MANUAL: 0.5 10E3/UL (ref 0–1.3)
MONOCYTES NFR BLD AUTO: 13 %
MONOCYTES NFR BLD AUTO: 15 %
MONOCYTES NFR BLD AUTO: 15 %
MONOCYTES NFR BLD AUTO: 17 %
MONOCYTES NFR BLD AUTO: 18 %
MONOCYTES NFR BLD AUTO: 18 %
MONOCYTES NFR BLD AUTO: 19 %
MONOCYTES NFR BLD AUTO: 20 %
MONOCYTES NFR BLD AUTO: 21 %
MONOCYTES NFR BLD AUTO: 5 %
MONOCYTES NFR BLD AUTO: 8 %
MONOCYTES NFR BLD MANUAL: 2 %
MUCOUS THREADS #/AREA URNS LPF: PRESENT /LPF
NEUTROPHILS # BLD AUTO: 18.5 10E3/UL (ref 1.6–8.3)
NEUTROPHILS # BLD AUTO: 2.1 10E3/UL (ref 1.6–8.3)
NEUTROPHILS # BLD AUTO: 2.3 10E3/UL (ref 1.6–8.3)
NEUTROPHILS # BLD AUTO: 2.3 10E3/UL (ref 1.6–8.3)
NEUTROPHILS # BLD AUTO: 2.6 10E3/UL (ref 1.6–8.3)
NEUTROPHILS # BLD AUTO: 2.7 10E3/UL (ref 1.6–8.3)
NEUTROPHILS # BLD AUTO: 3.4 10E3/UL (ref 1.6–8.3)
NEUTROPHILS # BLD AUTO: 3.4 10E3/UL (ref 1.6–8.3)
NEUTROPHILS # BLD AUTO: 3.5 10E3/UL (ref 1.6–8.3)
NEUTROPHILS # BLD AUTO: 5.1 10E3/UL (ref 1.6–8.3)
NEUTROPHILS # BLD AUTO: 5.6 10E3/UL (ref 1.6–8.3)
NEUTROPHILS # BLD AUTO: 6.6 10E3/UL (ref 1.6–8.3)
NEUTROPHILS # BLD AUTO: 7.1 10E3/UL (ref 1.6–8.3)
NEUTROPHILS # BLD MANUAL: 22.4 10E3/UL (ref 1.6–8.3)
NEUTROPHILS NFR BLD AUTO: 61 %
NEUTROPHILS NFR BLD AUTO: 62 %
NEUTROPHILS NFR BLD AUTO: 63 %
NEUTROPHILS NFR BLD AUTO: 65 %
NEUTROPHILS NFR BLD AUTO: 65 %
NEUTROPHILS NFR BLD AUTO: 66 %
NEUTROPHILS NFR BLD AUTO: 70 %
NEUTROPHILS NFR BLD AUTO: 71 %
NEUTROPHILS NFR BLD AUTO: 77 %
NEUTROPHILS NFR BLD AUTO: 83 %
NEUTROPHILS NFR BLD AUTO: 84 %
NEUTROPHILS NFR BLD AUTO: 86 %
NEUTROPHILS NFR BLD AUTO: 86 %
NEUTROPHILS NFR BLD MANUAL: 93 %
NITRATE UR QL: NEGATIVE
NITRATE UR QL: NEGATIVE
NRBC # BLD AUTO: 0 10E3/UL
NRBC BLD AUTO-RTO: 0 /100
PH UR STRIP: 5 [PH] (ref 5–7)
PH UR STRIP: 6 [PH] (ref 5–7)
PLAT MORPH BLD: ABNORMAL
PLAT MORPH BLD: ABNORMAL
PLATELET # BLD AUTO: 110 10E3/UL (ref 150–450)
PLATELET # BLD AUTO: 121 10E3/UL (ref 150–450)
PLATELET # BLD AUTO: 140 10E3/UL (ref 150–450)
PLATELET # BLD AUTO: 153 10E3/UL (ref 150–450)
PLATELET # BLD AUTO: 161 10E3/UL (ref 150–450)
PLATELET # BLD AUTO: 164 10E3/UL (ref 150–450)
PLATELET # BLD AUTO: 172 10E3/UL (ref 150–450)
PLATELET # BLD AUTO: 174 10E3/UL (ref 150–450)
PLATELET # BLD AUTO: 182 10E3/UL (ref 150–450)
PLATELET # BLD AUTO: 183 10E3/UL (ref 150–450)
PLATELET # BLD AUTO: 188 10E3/UL (ref 150–450)
PLATELET # BLD AUTO: 193 10E3/UL (ref 150–450)
PLATELET # BLD AUTO: 196 10E3/UL (ref 150–450)
PLATELET # BLD AUTO: 207 10E3/UL (ref 150–450)
PLATELET # BLD AUTO: 224 10E3/UL (ref 150–450)
POTASSIUM BLD-SCNC: 4.1 MMOL/L (ref 3.4–5.3)
POTASSIUM BLD-SCNC: 4.4 MMOL/L (ref 3.4–5.3)
POTASSIUM SERPL-SCNC: 3.7 MMOL/L (ref 3.4–5.3)
POTASSIUM SERPL-SCNC: 3.8 MMOL/L (ref 3.4–5.3)
POTASSIUM SERPL-SCNC: 3.8 MMOL/L (ref 3.4–5.3)
POTASSIUM SERPL-SCNC: 4 MMOL/L (ref 3.4–5.3)
POTASSIUM SERPL-SCNC: 4 MMOL/L (ref 3.4–5.3)
POTASSIUM SERPL-SCNC: 4.1 MMOL/L (ref 3.4–5.3)
POTASSIUM SERPL-SCNC: 4.2 MMOL/L (ref 3.4–5.3)
POTASSIUM SERPL-SCNC: 4.3 MMOL/L (ref 3.4–5.3)
PROT SERPL-MCNC: 5.7 G/DL (ref 6.4–8.3)
PROT SERPL-MCNC: 5.8 G/DL (ref 6.4–8.3)
PROT SERPL-MCNC: 6 G/DL (ref 6.4–8.3)
PROT SERPL-MCNC: 6 G/DL (ref 6.4–8.3)
PROT SERPL-MCNC: 6.2 G/DL (ref 6.4–8.3)
PROT SERPL-MCNC: 6.4 G/DL (ref 6.4–8.3)
PROT SERPL-MCNC: 6.4 G/DL (ref 6.8–8.8)
PROT SERPL-MCNC: 6.5 G/DL (ref 6.4–8.3)
PROT SERPL-MCNC: 6.5 G/DL (ref 6.8–8.8)
PROT SERPL-MCNC: 6.6 G/DL (ref 6.4–8.3)
PROT SERPL-MCNC: 6.7 G/DL (ref 6.4–8.3)
PROT SERPL-MCNC: 7.1 G/DL (ref 6.4–8.3)
RBC # BLD AUTO: 3.77 10E6/UL (ref 3.8–5.2)
RBC # BLD AUTO: 3.82 10E6/UL (ref 3.8–5.2)
RBC # BLD AUTO: 3.92 10E6/UL (ref 3.8–5.2)
RBC # BLD AUTO: 3.98 10E6/UL (ref 3.8–5.2)
RBC # BLD AUTO: 3.98 10E6/UL (ref 3.8–5.2)
RBC # BLD AUTO: 4 10E6/UL (ref 3.8–5.2)
RBC # BLD AUTO: 4.02 10E6/UL (ref 3.8–5.2)
RBC # BLD AUTO: 4.11 10E6/UL (ref 3.8–5.2)
RBC # BLD AUTO: 4.14 10E6/UL (ref 3.8–5.2)
RBC # BLD AUTO: 4.22 10E6/UL (ref 3.8–5.2)
RBC # BLD AUTO: 4.23 10E6/UL (ref 3.8–5.2)
RBC # BLD AUTO: 4.26 10E6/UL (ref 3.8–5.2)
RBC # BLD AUTO: 4.55 10E6/UL (ref 3.8–5.2)
RBC # BLD AUTO: 4.79 10E6/UL (ref 3.8–5.2)
RBC # BLD AUTO: 4.9 10E6/UL (ref 3.8–5.2)
RBC #/AREA URNS AUTO: ABNORMAL /HPF
RBC MORPH BLD: ABNORMAL
RBC MORPH BLD: ABNORMAL
RBC URINE: 4 /HPF
RSV RNA SPEC QL NAA+PROBE: NOT DETECTED
RSV RNA SPEC QL NAA+PROBE: NOT DETECTED
RV+EV RNA SPEC QL NAA+PROBE: NOT DETECTED
SKIP: ABNORMAL
SODIUM SERPL-SCNC: 135 MMOL/L (ref 136–145)
SODIUM SERPL-SCNC: 135 MMOL/L (ref 136–145)
SODIUM SERPL-SCNC: 136 MMOL/L (ref 135–145)
SODIUM SERPL-SCNC: 136 MMOL/L (ref 136–145)
SODIUM SERPL-SCNC: 136 MMOL/L (ref 136–145)
SODIUM SERPL-SCNC: 137 MMOL/L (ref 133–144)
SODIUM SERPL-SCNC: 137 MMOL/L (ref 135–145)
SODIUM SERPL-SCNC: 137 MMOL/L (ref 136–145)
SODIUM SERPL-SCNC: 137 MMOL/L (ref 136–145)
SODIUM SERPL-SCNC: 138 MMOL/L (ref 135–145)
SODIUM SERPL-SCNC: 138 MMOL/L (ref 136–145)
SODIUM SERPL-SCNC: 138 MMOL/L (ref 136–145)
SODIUM SERPL-SCNC: 139 MMOL/L (ref 133–144)
SODIUM SERPL-SCNC: 140 MMOL/L (ref 136–145)
SP GR UR STRIP: 1.02 (ref 1–1.03)
SP GR UR STRIP: 1.03 (ref 1–1.03)
SPECIMEN EXPIRATION DATE: NORMAL
SPHEROCYTES BLD QL SMEAR: SLIGHT
UROBILINOGEN UR STRIP-MCNC: NORMAL MG/DL
UROBILINOGEN UR STRIP-MCNC: NORMAL MG/DL
WBC # BLD AUTO: 13.1 10E3/UL (ref 4–11)
WBC # BLD AUTO: 22.3 10E3/UL (ref 4–11)
WBC # BLD AUTO: 24.1 10E3/UL (ref 4–11)
WBC # BLD AUTO: 3.5 10E3/UL (ref 4–11)
WBC # BLD AUTO: 3.6 10E3/UL (ref 4–11)
WBC # BLD AUTO: 3.7 10E3/UL (ref 4–11)
WBC # BLD AUTO: 3.9 10E3/UL (ref 4–11)
WBC # BLD AUTO: 4.1 10E3/UL (ref 4–11)
WBC # BLD AUTO: 4.7 10E3/UL (ref 4–11)
WBC # BLD AUTO: 4.9 10E3/UL (ref 4–11)
WBC # BLD AUTO: 5.4 10E3/UL (ref 4–11)
WBC # BLD AUTO: 6.5 10E3/UL (ref 4–11)
WBC # BLD AUTO: 6.7 10E3/UL (ref 4–11)
WBC # BLD AUTO: 7.6 10E3/UL (ref 4–11)
WBC # BLD AUTO: 8.4 10E3/UL (ref 4–11)
WBC #/AREA URNS AUTO: ABNORMAL /HPF
WBC URINE: 5 /HPF

## 2023-01-01 PROCEDURE — 96375 TX/PRO/DX INJ NEW DRUG ADDON: CPT

## 2023-01-01 PROCEDURE — 85025 COMPLETE CBC W/AUTO DIFF WBC: CPT | Performed by: INTERNAL MEDICINE

## 2023-01-01 PROCEDURE — 99213 OFFICE O/P EST LOW 20 MIN: CPT | Mod: VID | Performed by: HOSPITALIST

## 2023-01-01 PROCEDURE — 87581 M.PNEUMON DNA AMP PROBE: CPT | Performed by: NURSE PRACTITIONER

## 2023-01-01 PROCEDURE — 99214 OFFICE O/P EST MOD 30 MIN: CPT | Mod: VID | Performed by: HOSPITALIST

## 2023-01-01 PROCEDURE — A9552 F18 FDG: HCPCS | Performed by: INTERNAL MEDICINE

## 2023-01-01 PROCEDURE — 97802 MEDICAL NUTRITION INDIV IN: CPT | Performed by: DIETITIAN, REGISTERED

## 2023-01-01 PROCEDURE — 250N000011 HC RX IP 250 OP 636: Performed by: STUDENT IN AN ORGANIZED HEALTH CARE EDUCATION/TRAINING PROGRAM

## 2023-01-01 PROCEDURE — 99417 PROLNG OP E/M EACH 15 MIN: CPT | Performed by: NURSE PRACTITIONER

## 2023-01-01 PROCEDURE — 96413 CHEMO IV INFUSION 1 HR: CPT | Performed by: INTERNAL MEDICINE

## 2023-01-01 PROCEDURE — 99213 OFFICE O/P EST LOW 20 MIN: CPT | Mod: VID | Performed by: NURSE PRACTITIONER

## 2023-01-01 PROCEDURE — 86850 RBC ANTIBODY SCREEN: CPT | Performed by: EMERGENCY MEDICINE

## 2023-01-01 PROCEDURE — 74177 CT ABD & PELVIS W/CONTRAST: CPT | Mod: 26 | Performed by: STUDENT IN AN ORGANIZED HEALTH CARE EDUCATION/TRAINING PROGRAM

## 2023-01-01 PROCEDURE — 36415 COLL VENOUS BLD VENIPUNCTURE: CPT | Performed by: EMERGENCY MEDICINE

## 2023-01-01 PROCEDURE — 250N000011 HC RX IP 250 OP 636: Mod: JZ | Performed by: NURSE PRACTITIONER

## 2023-01-01 PROCEDURE — 96372 THER/PROPH/DIAG INJ SC/IM: CPT | Mod: 59 | Performed by: INTERNAL MEDICINE

## 2023-01-01 PROCEDURE — 78815 PET IMAGE W/CT SKULL-THIGH: CPT | Mod: 26 | Performed by: STUDENT IN AN ORGANIZED HEALTH CARE EDUCATION/TRAINING PROGRAM

## 2023-01-01 PROCEDURE — 250N000011 HC RX IP 250 OP 636: Performed by: INTERNAL MEDICINE

## 2023-01-01 PROCEDURE — 96377 APPLICATON ON-BODY INJECTOR: CPT | Mod: 59 | Performed by: INTERNAL MEDICINE

## 2023-01-01 PROCEDURE — 97140 MANUAL THERAPY 1/> REGIONS: CPT | Mod: GP | Performed by: PHYSICAL THERAPIST

## 2023-01-01 PROCEDURE — 86300 IMMUNOASSAY TUMOR CA 15-3: CPT | Performed by: INTERNAL MEDICINE

## 2023-01-01 PROCEDURE — 97110 THERAPEUTIC EXERCISES: CPT | Mod: GP | Performed by: PHYSICAL THERAPIST

## 2023-01-01 PROCEDURE — 99207 PR NO CHARGE LOS: CPT

## 2023-01-01 PROCEDURE — 99215 OFFICE O/P EST HI 40 MIN: CPT | Performed by: NURSE PRACTITIONER

## 2023-01-01 PROCEDURE — 96376 TX/PRO/DX INJ SAME DRUG ADON: CPT

## 2023-01-01 PROCEDURE — 96361 HYDRATE IV INFUSION ADD-ON: CPT | Performed by: EMERGENCY MEDICINE

## 2023-01-01 PROCEDURE — 96367 TX/PROPH/DG ADDL SEQ IV INF: CPT | Performed by: INTERNAL MEDICINE

## 2023-01-01 PROCEDURE — 250N000011 HC RX IP 250 OP 636: Mod: JZ | Performed by: EMERGENCY MEDICINE

## 2023-01-01 PROCEDURE — 85027 COMPLETE CBC AUTOMATED: CPT | Mod: 59 | Performed by: NURSE PRACTITIONER

## 2023-01-01 PROCEDURE — 96375 TX/PRO/DX INJ NEW DRUG ADDON: CPT | Performed by: EMERGENCY MEDICINE

## 2023-01-01 PROCEDURE — 99214 OFFICE O/P EST MOD 30 MIN: CPT | Mod: 25 | Performed by: INTERNAL MEDICINE

## 2023-01-01 PROCEDURE — 36415 COLL VENOUS BLD VENIPUNCTURE: CPT | Performed by: STUDENT IN AN ORGANIZED HEALTH CARE EDUCATION/TRAINING PROGRAM

## 2023-01-01 PROCEDURE — 99285 EMERGENCY DEPT VISIT HI MDM: CPT | Mod: 25

## 2023-01-01 PROCEDURE — 80053 COMPREHEN METABOLIC PANEL: CPT | Performed by: EMERGENCY MEDICINE

## 2023-01-01 PROCEDURE — 73562 X-RAY EXAM OF KNEE 3: CPT | Mod: RT

## 2023-01-01 PROCEDURE — 96376 TX/PRO/DX INJ SAME DRUG ADON: CPT | Mod: 59

## 2023-01-01 PROCEDURE — 80053 COMPREHEN METABOLIC PANEL: CPT | Performed by: NURSE PRACTITIONER

## 2023-01-01 PROCEDURE — 51798 US URINE CAPACITY MEASURE: CPT | Performed by: STUDENT IN AN ORGANIZED HEALTH CARE EDUCATION/TRAINING PROGRAM

## 2023-01-01 PROCEDURE — 80053 COMPREHEN METABOLIC PANEL: CPT | Performed by: INTERNAL MEDICINE

## 2023-01-01 PROCEDURE — G0378 HOSPITAL OBSERVATION PER HR: HCPCS

## 2023-01-01 PROCEDURE — 81001 URINALYSIS AUTO W/SCOPE: CPT | Performed by: NURSE PRACTITIONER

## 2023-01-01 PROCEDURE — 999N000248 HC STATISTIC IV INSERT WITH US BY RN

## 2023-01-01 PROCEDURE — 99214 OFFICE O/P EST MOD 30 MIN: CPT | Mod: VID | Performed by: INTERNAL MEDICINE

## 2023-01-01 PROCEDURE — 71260 CT THORAX DX C+: CPT | Mod: 26 | Performed by: RADIOLOGY

## 2023-01-01 PROCEDURE — 36415 COLL VENOUS BLD VENIPUNCTURE: CPT | Performed by: NURSE PRACTITIONER

## 2023-01-01 PROCEDURE — 85025 COMPLETE CBC W/AUTO DIFF WBC: CPT | Performed by: NURSE PRACTITIONER

## 2023-01-01 PROCEDURE — 97162 PT EVAL MOD COMPLEX 30 MIN: CPT | Mod: GP

## 2023-01-01 PROCEDURE — 36591 DRAW BLOOD OFF VENOUS DEVICE: CPT | Performed by: EMERGENCY MEDICINE

## 2023-01-01 PROCEDURE — 99221 1ST HOSP IP/OBS SF/LOW 40: CPT | Mod: AI | Performed by: NURSE PRACTITIONER

## 2023-01-01 PROCEDURE — 72128 CT CHEST SPINE W/O DYE: CPT

## 2023-01-01 PROCEDURE — 70486 CT MAXILLOFACIAL W/O DYE: CPT

## 2023-01-01 PROCEDURE — 99285 EMERGENCY DEPT VISIT HI MDM: CPT | Mod: 25 | Performed by: EMERGENCY MEDICINE

## 2023-01-01 PROCEDURE — 250N000013 HC RX MED GY IP 250 OP 250 PS 637: Performed by: EMERGENCY MEDICINE

## 2023-01-01 PROCEDURE — 86140 C-REACTIVE PROTEIN: CPT | Performed by: NURSE PRACTITIONER

## 2023-01-01 PROCEDURE — 85730 THROMBOPLASTIN TIME PARTIAL: CPT | Performed by: EMERGENCY MEDICINE

## 2023-01-01 PROCEDURE — 250N000011 HC RX IP 250 OP 636: Performed by: EMERGENCY MEDICINE

## 2023-01-01 PROCEDURE — 99285 EMERGENCY DEPT VISIT HI MDM: CPT | Mod: 25 | Performed by: STUDENT IN AN ORGANIZED HEALTH CARE EDUCATION/TRAINING PROGRAM

## 2023-01-01 PROCEDURE — 97165 OT EVAL LOW COMPLEX 30 MIN: CPT | Mod: GO | Performed by: SPECIALIST/TECHNOLOGIST

## 2023-01-01 PROCEDURE — 78815 PET IMAGE W/CT SKULL-THIGH: CPT | Mod: PS

## 2023-01-01 PROCEDURE — 99239 HOSP IP/OBS DSCHRG MGMT >30: CPT | Performed by: NURSE PRACTITIONER

## 2023-01-01 PROCEDURE — 81001 URINALYSIS AUTO W/SCOPE: CPT | Performed by: STUDENT IN AN ORGANIZED HEALTH CARE EDUCATION/TRAINING PROGRAM

## 2023-01-01 PROCEDURE — 72131 CT LUMBAR SPINE W/O DYE: CPT

## 2023-01-01 PROCEDURE — 258N000003 HC RX IP 258 OP 636: Performed by: INTERNAL MEDICINE

## 2023-01-01 PROCEDURE — 250N000011 HC RX IP 250 OP 636: Performed by: NURSE PRACTITIONER

## 2023-01-01 PROCEDURE — 99207 PR NO CHARGE NURSE ONLY: CPT

## 2023-01-01 PROCEDURE — 87633 RESP VIRUS 12-25 TARGETS: CPT | Performed by: NURSE PRACTITIONER

## 2023-01-01 PROCEDURE — 343N000001 HC RX 343: Performed by: INTERNAL MEDICINE

## 2023-01-01 PROCEDURE — 99214 OFFICE O/P EST MOD 30 MIN: CPT | Performed by: NURSE PRACTITIONER

## 2023-01-01 PROCEDURE — 97112 NEUROMUSCULAR REEDUCATION: CPT | Mod: GP | Performed by: PHYSICAL THERAPIST

## 2023-01-01 PROCEDURE — 99215 OFFICE O/P EST HI 40 MIN: CPT | Mod: VID | Performed by: INTERNAL MEDICINE

## 2023-01-01 PROCEDURE — 85007 BL SMEAR W/DIFF WBC COUNT: CPT | Mod: XU | Performed by: NURSE PRACTITIONER

## 2023-01-01 PROCEDURE — 99204 OFFICE O/P NEW MOD 45 MIN: CPT | Performed by: ANESTHESIOLOGY

## 2023-01-01 PROCEDURE — 250N000013 HC RX MED GY IP 250 OP 250 PS 637: Performed by: INTERNAL MEDICINE

## 2023-01-01 PROCEDURE — 85610 PROTHROMBIN TIME: CPT | Performed by: EMERGENCY MEDICINE

## 2023-01-01 PROCEDURE — 74177 CT ABD & PELVIS W/CONTRAST: CPT

## 2023-01-01 PROCEDURE — 85004 AUTOMATED DIFF WBC COUNT: CPT | Performed by: EMERGENCY MEDICINE

## 2023-01-01 PROCEDURE — 99212 OFFICE O/P EST SF 10 MIN: CPT | Mod: VID | Performed by: NURSE PRACTITIONER

## 2023-01-01 PROCEDURE — 258N000003 HC RX IP 258 OP 636: Performed by: EMERGENCY MEDICINE

## 2023-01-01 PROCEDURE — 72125 CT NECK SPINE W/O DYE: CPT

## 2023-01-01 PROCEDURE — 78816 PET IMAGE W/CT FULL BODY: CPT | Mod: PS

## 2023-01-01 PROCEDURE — 82077 ASSAY SPEC XCP UR&BREATH IA: CPT | Performed by: EMERGENCY MEDICINE

## 2023-01-01 PROCEDURE — 96372 THER/PROPH/DIAG INJ SC/IM: CPT | Mod: 59 | Performed by: NURSE PRACTITIONER

## 2023-01-01 PROCEDURE — 82977 ASSAY OF GGT: CPT | Performed by: NURSE PRACTITIONER

## 2023-01-01 PROCEDURE — 250N000013 HC RX MED GY IP 250 OP 250 PS 637: Performed by: NURSE PRACTITIONER

## 2023-01-01 PROCEDURE — 71260 CT THORAX DX C+: CPT | Mod: 26 | Performed by: STUDENT IN AN ORGANIZED HEALTH CARE EDUCATION/TRAINING PROGRAM

## 2023-01-01 PROCEDURE — 99207 PR NO CHARGE LOS: CPT | Performed by: INTERNAL MEDICINE

## 2023-01-01 PROCEDURE — 80053 COMPREHEN METABOLIC PANEL: CPT | Performed by: STUDENT IN AN ORGANIZED HEALTH CARE EDUCATION/TRAINING PROGRAM

## 2023-01-01 PROCEDURE — 74177 CT ABD & PELVIS W/CONTRAST: CPT | Mod: 26 | Performed by: RADIOLOGY

## 2023-01-01 PROCEDURE — A9585 GADOBUTROL INJECTION: HCPCS | Performed by: NURSE PRACTITIONER

## 2023-01-01 PROCEDURE — 78816 PET IMAGE W/CT FULL BODY: CPT | Mod: 26 | Performed by: RADIOLOGY

## 2023-01-01 PROCEDURE — 82248 BILIRUBIN DIRECT: CPT | Performed by: STUDENT IN AN ORGANIZED HEALTH CARE EDUCATION/TRAINING PROGRAM

## 2023-01-01 PROCEDURE — 99284 EMERGENCY DEPT VISIT MOD MDM: CPT | Mod: 25 | Performed by: EMERGENCY MEDICINE

## 2023-01-01 PROCEDURE — 96374 THER/PROPH/DIAG INJ IV PUSH: CPT | Performed by: EMERGENCY MEDICINE

## 2023-01-01 PROCEDURE — 96372 THER/PROPH/DIAG INJ SC/IM: CPT

## 2023-01-01 PROCEDURE — 99284 EMERGENCY DEPT VISIT MOD MDM: CPT | Performed by: STUDENT IN AN ORGANIZED HEALTH CARE EDUCATION/TRAINING PROGRAM

## 2023-01-01 PROCEDURE — 70450 CT HEAD/BRAIN W/O DYE: CPT

## 2023-01-01 PROCEDURE — 97530 THERAPEUTIC ACTIVITIES: CPT | Mod: GO | Performed by: SPECIALIST/TECHNOLOGIST

## 2023-01-01 PROCEDURE — 85652 RBC SED RATE AUTOMATED: CPT | Performed by: NURSE PRACTITIONER

## 2023-01-01 PROCEDURE — 250N000009 HC RX 250: Performed by: EMERGENCY MEDICINE

## 2023-01-01 PROCEDURE — 70553 MRI BRAIN STEM W/O & W/DYE: CPT

## 2023-01-01 PROCEDURE — 85014 HEMATOCRIT: CPT | Performed by: STUDENT IN AN ORGANIZED HEALTH CARE EDUCATION/TRAINING PROGRAM

## 2023-01-01 PROCEDURE — 96374 THER/PROPH/DIAG INJ IV PUSH: CPT | Mod: 59

## 2023-01-01 PROCEDURE — 99284 EMERGENCY DEPT VISIT MOD MDM: CPT | Performed by: EMERGENCY MEDICINE

## 2023-01-01 PROCEDURE — 255N000002 HC RX 255 OP 636: Performed by: NURSE PRACTITIONER

## 2023-01-01 PROCEDURE — 87486 CHLMYD PNEUM DNA AMP PROBE: CPT | Performed by: NURSE PRACTITIONER

## 2023-01-01 PROCEDURE — G0260 INJ FOR SACROILIAC JT ANESTH: HCPCS

## 2023-01-01 PROCEDURE — 99204 OFFICE O/P NEW MOD 45 MIN: CPT | Performed by: STUDENT IN AN ORGANIZED HEALTH CARE EDUCATION/TRAINING PROGRAM

## 2023-01-01 PROCEDURE — 71046 X-RAY EXAM CHEST 2 VIEWS: CPT | Mod: GC | Performed by: RADIOLOGY

## 2023-01-01 PROCEDURE — 99215 OFFICE O/P EST HI 40 MIN: CPT | Mod: 25 | Performed by: NURSE PRACTITIONER

## 2023-01-01 PROCEDURE — 72148 MRI LUMBAR SPINE W/O DYE: CPT

## 2023-01-01 PROCEDURE — 85025 COMPLETE CBC W/AUTO DIFF WBC: CPT | Performed by: EMERGENCY MEDICINE

## 2023-01-01 RX ORDER — ALBUTEROL SULFATE 0.83 MG/ML
2.5 SOLUTION RESPIRATORY (INHALATION)
Status: CANCELLED | OUTPATIENT
Start: 2023-01-01

## 2023-01-01 RX ORDER — PROCHLORPERAZINE MALEATE 5 MG
10 TABLET ORAL EVERY 6 HOURS PRN
Status: DISCONTINUED | OUTPATIENT
Start: 2023-01-01 | End: 2023-01-01 | Stop reason: HOSPADM

## 2023-01-01 RX ORDER — IOPAMIDOL 408 MG/ML
INJECTION, SOLUTION INTRATHECAL DAILY PRN
Status: DISCONTINUED | OUTPATIENT
Start: 2023-01-01 | End: 2023-01-01 | Stop reason: HOSPADM

## 2023-01-01 RX ORDER — HEPARIN SODIUM (PORCINE) LOCK FLUSH IV SOLN 100 UNIT/ML 100 UNIT/ML
5 SOLUTION INTRAVENOUS
Status: CANCELLED | OUTPATIENT
Start: 2023-01-01

## 2023-01-01 RX ORDER — DIPHENHYDRAMINE HYDROCHLORIDE 50 MG/ML
50 INJECTION INTRAMUSCULAR; INTRAVENOUS
Status: CANCELLED
Start: 2023-01-01

## 2023-01-01 RX ORDER — LIDOCAINE HYDROCHLORIDE 10 MG/ML
INJECTION, SOLUTION EPIDURAL; INFILTRATION; INTRACAUDAL; PERINEURAL DAILY PRN
Status: DISCONTINUED | OUTPATIENT
Start: 2023-01-01 | End: 2023-01-01 | Stop reason: HOSPADM

## 2023-01-01 RX ORDER — EPINEPHRINE 1 MG/ML
0.3 INJECTION, SOLUTION INTRAMUSCULAR; SUBCUTANEOUS EVERY 5 MIN PRN
Status: CANCELLED | OUTPATIENT
Start: 2023-01-01

## 2023-01-01 RX ORDER — HEPARIN SODIUM,PORCINE 10 UNIT/ML
5-10 VIAL (ML) INTRAVENOUS EVERY 24 HOURS
Status: DISCONTINUED | OUTPATIENT
Start: 2023-01-01 | End: 2023-01-01 | Stop reason: HOSPADM

## 2023-01-01 RX ORDER — HEPARIN SODIUM (PORCINE) LOCK FLUSH IV SOLN 100 UNIT/ML 100 UNIT/ML
5 SOLUTION INTRAVENOUS
Status: DISCONTINUED | OUTPATIENT
Start: 2023-01-01 | End: 2023-01-01 | Stop reason: HOSPADM

## 2023-01-01 RX ORDER — TRAZODONE HYDROCHLORIDE 50 MG/1
50 TABLET, FILM COATED ORAL AT BEDTIME
Status: DISCONTINUED | OUTPATIENT
Start: 2023-01-01 | End: 2023-01-01 | Stop reason: HOSPADM

## 2023-01-01 RX ORDER — HEPARIN SODIUM (PORCINE) LOCK FLUSH IV SOLN 100 UNIT/ML 100 UNIT/ML
5 SOLUTION INTRAVENOUS
Status: ACTIVE | OUTPATIENT
Start: 2023-01-01

## 2023-01-01 RX ORDER — KETOROLAC TROMETHAMINE 15 MG/ML
15 INJECTION, SOLUTION INTRAMUSCULAR; INTRAVENOUS
Qty: 1 ML | Refills: 0 | OUTPATIENT
Start: 2023-01-01 | End: 2023-01-01

## 2023-01-01 RX ORDER — METHYLPREDNISOLONE SODIUM SUCCINATE 125 MG/2ML
125 INJECTION, POWDER, LYOPHILIZED, FOR SOLUTION INTRAMUSCULAR; INTRAVENOUS
Status: CANCELLED
Start: 2023-01-01

## 2023-01-01 RX ORDER — ONDANSETRON 2 MG/ML
4 INJECTION INTRAMUSCULAR; INTRAVENOUS EVERY 6 HOURS PRN
Status: DISCONTINUED | OUTPATIENT
Start: 2023-01-01 | End: 2023-01-01 | Stop reason: HOSPADM

## 2023-01-01 RX ORDER — LORAZEPAM 2 MG/ML
0.5 INJECTION INTRAMUSCULAR EVERY 4 HOURS PRN
Status: CANCELLED | OUTPATIENT
Start: 2023-01-01

## 2023-01-01 RX ORDER — MORPHINE SULFATE 15 MG/1
15-30 TABLET ORAL
Status: DISCONTINUED | OUTPATIENT
Start: 2023-01-01 | End: 2023-01-01 | Stop reason: HOSPADM

## 2023-01-01 RX ORDER — KETOROLAC TROMETHAMINE 30 MG/ML
15 INJECTION, SOLUTION INTRAMUSCULAR; INTRAVENOUS ONCE
Status: COMPLETED | OUTPATIENT
Start: 2023-01-01 | End: 2023-01-01

## 2023-01-01 RX ORDER — MORPHINE SULFATE 15 MG/1
15 TABLET ORAL ONCE
Status: COMPLETED | OUTPATIENT
Start: 2023-01-01 | End: 2023-01-01

## 2023-01-01 RX ORDER — ACETAMINOPHEN 325 MG/1
975 TABLET ORAL 3 TIMES DAILY
Status: DISCONTINUED | OUTPATIENT
Start: 2023-01-01 | End: 2023-01-01 | Stop reason: HOSPADM

## 2023-01-01 RX ORDER — SODIUM CHLORIDE 9 MG/ML
INJECTION, SOLUTION INTRAVENOUS CONTINUOUS
Status: DISCONTINUED | OUTPATIENT
Start: 2023-01-01 | End: 2023-01-01 | Stop reason: HOSPADM

## 2023-01-01 RX ORDER — BUPIVACAINE HYDROCHLORIDE 2.5 MG/ML
INJECTION, SOLUTION EPIDURAL; INFILTRATION; INTRACAUDAL DAILY PRN
Status: DISCONTINUED | OUTPATIENT
Start: 2023-01-01 | End: 2023-01-01 | Stop reason: HOSPADM

## 2023-01-01 RX ORDER — ONDANSETRON 4 MG/1
4 TABLET, ORALLY DISINTEGRATING ORAL ONCE
Status: COMPLETED | OUTPATIENT
Start: 2023-01-01 | End: 2023-01-01

## 2023-01-01 RX ORDER — ERENUMAB-AOOE 140 MG/ML
140 INJECTION, SOLUTION SUBCUTANEOUS
Qty: 1 ML | Refills: 11 | Status: SHIPPED | OUTPATIENT
Start: 2023-01-01 | End: 2023-01-01

## 2023-01-01 RX ORDER — METHYLPREDNISOLONE ACETATE 40 MG/ML
INJECTION, SUSPENSION INTRA-ARTICULAR; INTRALESIONAL; INTRAMUSCULAR; SOFT TISSUE DAILY PRN
Status: DISCONTINUED | OUTPATIENT
Start: 2023-01-01 | End: 2023-01-01 | Stop reason: HOSPADM

## 2023-01-01 RX ORDER — METHOCARBAMOL 750 MG/1
750 TABLET, FILM COATED ORAL 3 TIMES DAILY PRN
Status: DISCONTINUED | OUTPATIENT
Start: 2023-01-01 | End: 2023-01-01

## 2023-01-01 RX ORDER — ONDANSETRON 4 MG/1
4 TABLET, ORALLY DISINTEGRATING ORAL EVERY 8 HOURS PRN
Status: DISCONTINUED | OUTPATIENT
Start: 2023-01-01 | End: 2023-01-01

## 2023-01-01 RX ORDER — TRAZODONE HYDROCHLORIDE 50 MG/1
50 TABLET, FILM COATED ORAL AT BEDTIME
Qty: 30 TABLET | Refills: 2 | Status: SHIPPED | OUTPATIENT
Start: 2023-01-01 | End: 2023-01-01

## 2023-01-01 RX ORDER — ONDANSETRON 4 MG/1
4 TABLET, ORALLY DISINTEGRATING ORAL EVERY 8 HOURS PRN
Qty: 10 TABLET | Refills: 0 | Status: SHIPPED | OUTPATIENT
Start: 2023-01-01 | End: 2024-01-01

## 2023-01-01 RX ORDER — LORAZEPAM 2 MG/ML
1 INJECTION INTRAMUSCULAR ONCE
Status: COMPLETED | OUTPATIENT
Start: 2023-01-01 | End: 2023-01-01

## 2023-01-01 RX ORDER — PROPRANOLOL HYDROCHLORIDE 80 MG/1
80 CAPSULE, EXTENDED RELEASE ORAL DAILY
Status: DISCONTINUED | OUTPATIENT
Start: 2023-01-01 | End: 2023-01-01 | Stop reason: HOSPADM

## 2023-01-01 RX ORDER — NALOXONE HYDROCHLORIDE 0.4 MG/ML
0.2 INJECTION, SOLUTION INTRAMUSCULAR; INTRAVENOUS; SUBCUTANEOUS
Status: DISCONTINUED | OUTPATIENT
Start: 2023-01-01 | End: 2023-01-01 | Stop reason: HOSPADM

## 2023-01-01 RX ORDER — HEPARIN SODIUM (PORCINE) LOCK FLUSH IV SOLN 100 UNIT/ML 100 UNIT/ML
500 SOLUTION INTRAVENOUS ONCE
Status: COMPLETED | OUTPATIENT
Start: 2023-01-01 | End: 2023-01-01

## 2023-01-01 RX ORDER — HEPARIN SODIUM (PORCINE) LOCK FLUSH IV SOLN 100 UNIT/ML 100 UNIT/ML
5-10 SOLUTION INTRAVENOUS
Status: DISCONTINUED | OUTPATIENT
Start: 2023-01-01 | End: 2023-01-01 | Stop reason: HOSPADM

## 2023-01-01 RX ORDER — HEPARIN SODIUM,PORCINE 10 UNIT/ML
5 VIAL (ML) INTRAVENOUS
Status: CANCELLED | OUTPATIENT
Start: 2023-01-01

## 2023-01-01 RX ORDER — NALOXONE HYDROCHLORIDE 0.4 MG/ML
0.2 INJECTION, SOLUTION INTRAMUSCULAR; INTRAVENOUS; SUBCUTANEOUS
Status: CANCELLED | OUTPATIENT
Start: 2023-01-01

## 2023-01-01 RX ORDER — PROMETHAZINE HYDROCHLORIDE 25 MG/1
25 TABLET ORAL EVERY 6 HOURS PRN
Qty: 20 TABLET | Refills: 1 | Status: SHIPPED | OUTPATIENT
Start: 2023-01-01 | End: 2023-01-01

## 2023-01-01 RX ORDER — NARATRIPTAN 2.5 MG/1
2.5 TABLET ORAL
Qty: 18 TABLET | Refills: 9 | Status: SHIPPED | OUTPATIENT
Start: 2023-01-01 | End: 2023-01-01

## 2023-01-01 RX ORDER — ALBUTEROL SULFATE 90 UG/1
1-2 AEROSOL, METERED RESPIRATORY (INHALATION)
Status: CANCELLED
Start: 2023-01-01

## 2023-01-01 RX ORDER — VENLAFAXINE HYDROCHLORIDE 75 MG/1
225 CAPSULE, EXTENDED RELEASE ORAL DAILY
Qty: 270 CAPSULE | Refills: 0 | OUTPATIENT
Start: 2023-01-01

## 2023-01-01 RX ORDER — HEPARIN SODIUM (PORCINE) LOCK FLUSH IV SOLN 100 UNIT/ML 100 UNIT/ML
500 SOLUTION INTRAVENOUS EVERY 8 HOURS
Status: DISCONTINUED | OUTPATIENT
Start: 2023-01-01 | End: 2023-01-01 | Stop reason: HOSPADM

## 2023-01-01 RX ORDER — VENLAFAXINE HYDROCHLORIDE 75 MG/1
225 CAPSULE, EXTENDED RELEASE ORAL DAILY
Qty: 270 CAPSULE | Refills: 3 | Status: SHIPPED | OUTPATIENT
Start: 2023-01-01 | End: 2024-01-01

## 2023-01-01 RX ORDER — PROCHLORPERAZINE 25 MG
25 SUPPOSITORY, RECTAL RECTAL EVERY 12 HOURS PRN
Status: DISCONTINUED | OUTPATIENT
Start: 2023-01-01 | End: 2023-01-01 | Stop reason: HOSPADM

## 2023-01-01 RX ORDER — HYDROMORPHONE HYDROCHLORIDE 1 MG/ML
0.5 INJECTION, SOLUTION INTRAMUSCULAR; INTRAVENOUS; SUBCUTANEOUS
Status: DISCONTINUED | OUTPATIENT
Start: 2023-01-01 | End: 2023-01-01

## 2023-01-01 RX ORDER — METOCLOPRAMIDE 5 MG/1
5 TABLET ORAL EVERY 6 HOURS PRN
Qty: 20 TABLET | Refills: 0 | Status: SHIPPED | OUTPATIENT
Start: 2023-01-01 | End: 2023-01-01

## 2023-01-01 RX ORDER — POLYETHYLENE GLYCOL 3350 17 G/17G
17 POWDER, FOR SOLUTION ORAL DAILY
Status: DISCONTINUED | OUTPATIENT
Start: 2023-01-01 | End: 2023-01-01 | Stop reason: HOSPADM

## 2023-01-01 RX ORDER — MEPERIDINE HYDROCHLORIDE 25 MG/ML
25 INJECTION INTRAMUSCULAR; INTRAVENOUS; SUBCUTANEOUS EVERY 30 MIN PRN
Status: CANCELLED | OUTPATIENT
Start: 2023-01-01

## 2023-01-01 RX ORDER — METOCLOPRAMIDE 5 MG/1
5-10 TABLET ORAL EVERY 6 HOURS PRN
Qty: 20 TABLET | Refills: 5 | Status: SHIPPED | OUTPATIENT
Start: 2023-01-01 | End: 2024-01-01

## 2023-01-01 RX ORDER — TRAZODONE HYDROCHLORIDE 50 MG/1
TABLET, FILM COATED ORAL
Qty: 60 TABLET | Refills: 0 | Status: SHIPPED | OUTPATIENT
Start: 2023-01-01 | End: 2023-01-01

## 2023-01-01 RX ORDER — HEPARIN SODIUM (PORCINE) LOCK FLUSH IV SOLN 100 UNIT/ML 100 UNIT/ML
500 SOLUTION INTRAVENOUS
Status: DISCONTINUED | OUTPATIENT
Start: 2023-01-01 | End: 2023-01-01 | Stop reason: HOSPADM

## 2023-01-01 RX ORDER — LEVOFLOXACIN 500 MG/1
500 TABLET, FILM COATED ORAL DAILY
Qty: 5 TABLET | Refills: 0 | Status: SHIPPED | OUTPATIENT
Start: 2023-01-01 | End: 2023-01-01

## 2023-01-01 RX ORDER — MORPHINE SULFATE 15 MG/1
15-30 TABLET ORAL
Qty: 60 TABLET | Refills: 0 | Status: SHIPPED | OUTPATIENT
Start: 2023-01-01 | End: 2024-01-01

## 2023-01-01 RX ORDER — ZOLMITRIPTAN 5 MG/1
1 SPRAY NASAL
Qty: 12 EACH | Refills: 11 | Status: SHIPPED | OUTPATIENT
Start: 2023-01-01 | End: 2023-01-01

## 2023-01-01 RX ORDER — HEPARIN SODIUM,PORCINE 10 UNIT/ML
5-10 VIAL (ML) INTRAVENOUS
Status: DISCONTINUED | OUTPATIENT
Start: 2023-01-01 | End: 2023-01-01 | Stop reason: HOSPADM

## 2023-01-01 RX ORDER — ZOLMITRIPTAN 5 MG/1
1 SPRAY NASAL
Qty: 12 EACH | Refills: 11 | OUTPATIENT
Start: 2023-01-01

## 2023-01-01 RX ORDER — PROPRANOLOL HYDROCHLORIDE 80 MG/1
CAPSULE, EXTENDED RELEASE ORAL
Qty: 270 CAPSULE | Refills: 3 | Status: SHIPPED | OUTPATIENT
Start: 2023-01-01 | End: 2023-01-01

## 2023-01-01 RX ORDER — ONDANSETRON 2 MG/ML
4 INJECTION INTRAMUSCULAR; INTRAVENOUS EVERY 30 MIN PRN
Status: DISCONTINUED | OUTPATIENT
Start: 2023-01-01 | End: 2023-01-01

## 2023-01-01 RX ORDER — CYCLOBENZAPRINE HCL 5 MG
5 TABLET ORAL 3 TIMES DAILY PRN
Qty: 10 TABLET | Refills: 0 | Status: SHIPPED | OUTPATIENT
Start: 2023-01-01 | End: 2023-01-01

## 2023-01-01 RX ORDER — CYCLOBENZAPRINE HCL 5 MG
5 TABLET ORAL 3 TIMES DAILY PRN
Qty: 30 TABLET | Refills: 1 | Status: SHIPPED | OUTPATIENT
Start: 2023-01-01 | End: 2023-01-01

## 2023-01-01 RX ORDER — NALOXONE HYDROCHLORIDE 0.4 MG/ML
0.4 INJECTION, SOLUTION INTRAMUSCULAR; INTRAVENOUS; SUBCUTANEOUS
Status: DISCONTINUED | OUTPATIENT
Start: 2023-01-01 | End: 2023-01-01 | Stop reason: HOSPADM

## 2023-01-01 RX ORDER — ZOLMITRIPTAN 5 MG/1
1 SPRAY NASAL
Qty: 12 EACH | Refills: 11 | Status: SHIPPED | OUTPATIENT
Start: 2023-01-01 | End: 2024-01-01

## 2023-01-01 RX ORDER — LIDOCAINE 40 MG/G
CREAM TOPICAL
Status: DISCONTINUED | OUTPATIENT
Start: 2023-01-01 | End: 2023-01-01 | Stop reason: HOSPADM

## 2023-01-01 RX ORDER — LORAZEPAM 0.5 MG/1
.5-1 TABLET ORAL EVERY 6 HOURS PRN
Qty: 6 TABLET | Refills: 0 | Status: SHIPPED | OUTPATIENT
Start: 2023-01-01

## 2023-01-01 RX ORDER — LEVOFLOXACIN 500 MG/1
500 TABLET, FILM COATED ORAL DAILY
Status: COMPLETED | OUTPATIENT
Start: 2023-01-01 | End: 2023-01-01

## 2023-01-01 RX ORDER — CEPHALEXIN 500 MG/1
500 CAPSULE ORAL 4 TIMES DAILY
Qty: 40 CAPSULE | Refills: 0 | Status: SHIPPED | OUTPATIENT
Start: 2023-01-01 | End: 2023-01-01

## 2023-01-01 RX ORDER — PROPRANOLOL HYDROCHLORIDE 80 MG/1
160 CAPSULE, EXTENDED RELEASE ORAL AT BEDTIME
COMMUNITY
End: 2024-01-01

## 2023-01-01 RX ORDER — APIXABAN 5 MG/1
TABLET, FILM COATED ORAL
Qty: 60 TABLET | Refills: 0 | Status: SHIPPED | OUTPATIENT
Start: 2023-01-01 | End: 2023-01-01

## 2023-01-01 RX ORDER — BUSPIRONE HYDROCHLORIDE 5 MG/1
15 TABLET ORAL 2 TIMES DAILY
Status: DISCONTINUED | OUTPATIENT
Start: 2023-01-01 | End: 2023-01-01 | Stop reason: HOSPADM

## 2023-01-01 RX ORDER — LANOLIN ALCOHOL/MO/W.PET/CERES
3 CREAM (GRAM) TOPICAL
Status: DISCONTINUED | OUTPATIENT
Start: 2023-01-01 | End: 2023-01-01 | Stop reason: HOSPADM

## 2023-01-01 RX ORDER — KETOROLAC TROMETHAMINE 15 MG/ML
15 INJECTION, SOLUTION INTRAMUSCULAR; INTRAVENOUS ONCE
Status: COMPLETED | OUTPATIENT
Start: 2023-01-01 | End: 2023-01-01

## 2023-01-01 RX ORDER — BISACODYL 10 MG
10 SUPPOSITORY, RECTAL RECTAL DAILY PRN
Status: DISCONTINUED | OUTPATIENT
Start: 2023-01-01 | End: 2023-01-01 | Stop reason: HOSPADM

## 2023-01-01 RX ORDER — GALCANEZUMAB 120 MG/ML
INJECTION, SOLUTION SUBCUTANEOUS
Qty: 2 ML | Refills: 11 | OUTPATIENT
Start: 2023-01-01

## 2023-01-01 RX ORDER — IOPAMIDOL 755 MG/ML
10-135 INJECTION, SOLUTION INTRAVASCULAR ONCE
Status: COMPLETED | OUTPATIENT
Start: 2023-01-01 | End: 2023-01-01

## 2023-01-01 RX ORDER — DIPHENHYDRAMINE HCL 25 MG
25 CAPSULE ORAL EVERY 6 HOURS PRN
Status: DISCONTINUED | OUTPATIENT
Start: 2023-01-01 | End: 2023-01-01 | Stop reason: HOSPADM

## 2023-01-01 RX ORDER — PROPRANOLOL HYDROCHLORIDE 80 MG/1
CAPSULE, EXTENDED RELEASE ORAL
Qty: 270 CAPSULE | Refills: 3 | Status: SHIPPED | OUTPATIENT
Start: 2023-01-01 | End: 2024-01-01

## 2023-01-01 RX ORDER — HYDROMORPHONE HYDROCHLORIDE 1 MG/ML
0.5 INJECTION, SOLUTION INTRAMUSCULAR; INTRAVENOUS; SUBCUTANEOUS EVERY 30 MIN PRN
Status: COMPLETED | OUTPATIENT
Start: 2023-01-01 | End: 2023-01-01

## 2023-01-01 RX ORDER — BUSPIRONE HYDROCHLORIDE 15 MG/1
15 TABLET ORAL 2 TIMES DAILY
Qty: 180 TABLET | Refills: 0 | Status: SHIPPED | OUTPATIENT
Start: 2023-01-01 | End: 2023-01-01

## 2023-01-01 RX ORDER — BUSPIRONE HYDROCHLORIDE 15 MG/1
15 TABLET ORAL 2 TIMES DAILY
Qty: 180 TABLET | Refills: 0 | Status: SHIPPED | OUTPATIENT
Start: 2023-01-01 | End: 2024-01-01

## 2023-01-01 RX ORDER — DIHYDROERGOTAMINE MESYLATE 4 MG/ML
1 SPRAY NASAL PRN
Qty: 3 ML | Refills: 9 | Status: SHIPPED | OUTPATIENT
Start: 2023-01-01 | End: 2023-01-01

## 2023-01-01 RX ORDER — HEPARIN SODIUM (PORCINE) LOCK FLUSH IV SOLN 100 UNIT/ML 100 UNIT/ML
500 SOLUTION INTRAVENOUS DAILY PRN
Status: DISCONTINUED | OUTPATIENT
Start: 2023-01-01 | End: 2023-01-01 | Stop reason: HOSPADM

## 2023-01-01 RX ORDER — CYCLOBENZAPRINE HCL 5 MG
5 TABLET ORAL 3 TIMES DAILY PRN
Status: DISCONTINUED | OUTPATIENT
Start: 2023-01-01 | End: 2023-01-01 | Stop reason: HOSPADM

## 2023-01-01 RX ORDER — ONDANSETRON 4 MG/1
4 TABLET, ORALLY DISINTEGRATING ORAL EVERY 6 HOURS PRN
Status: DISCONTINUED | OUTPATIENT
Start: 2023-01-01 | End: 2023-01-01 | Stop reason: HOSPADM

## 2023-01-01 RX ORDER — ONDANSETRON 4 MG/1
4 TABLET, ORALLY DISINTEGRATING ORAL EVERY 8 HOURS PRN
Qty: 10 TABLET | Refills: 0 | Status: SHIPPED | OUTPATIENT
Start: 2023-01-01 | End: 2023-01-01

## 2023-01-01 RX ORDER — PROPRANOLOL HYDROCHLORIDE 80 MG/1
160 CAPSULE, EXTENDED RELEASE ORAL AT BEDTIME
Status: DISCONTINUED | OUTPATIENT
Start: 2023-01-01 | End: 2023-01-01 | Stop reason: HOSPADM

## 2023-01-01 RX ORDER — IOPAMIDOL 755 MG/ML
500 INJECTION, SOLUTION INTRAVASCULAR ONCE
Status: COMPLETED | OUTPATIENT
Start: 2023-01-01 | End: 2023-01-01

## 2023-01-01 RX ORDER — DIPHENHYDRAMINE HYDROCHLORIDE 50 MG/ML
25 INJECTION INTRAMUSCULAR; INTRAVENOUS ONCE
Status: COMPLETED | OUTPATIENT
Start: 2023-01-01 | End: 2023-01-01

## 2023-01-01 RX ORDER — GADOBUTROL 604.72 MG/ML
5.5 INJECTION INTRAVENOUS ONCE
Status: COMPLETED | OUTPATIENT
Start: 2023-01-01 | End: 2023-01-01

## 2023-01-01 RX ORDER — TRAZODONE HYDROCHLORIDE 50 MG/1
50 TABLET, FILM COATED ORAL AT BEDTIME
Qty: 30 TABLET | Refills: 2 | Status: SHIPPED | OUTPATIENT
Start: 2023-01-01 | End: 2024-01-01

## 2023-01-01 RX ORDER — METOCLOPRAMIDE 5 MG/1
5-10 TABLET ORAL EVERY 6 HOURS PRN
Status: DISCONTINUED | OUTPATIENT
Start: 2023-01-01 | End: 2023-01-01 | Stop reason: HOSPADM

## 2023-01-01 RX ADMIN — MORPHINE SULFATE 15 MG: 15 TABLET ORAL at 10:03

## 2023-01-01 RX ADMIN — DIPHENHYDRAMINE HYDROCHLORIDE 25 MG: 50 INJECTION, SOLUTION INTRAMUSCULAR; INTRAVENOUS at 16:05

## 2023-01-01 RX ADMIN — HEPARIN SODIUM (PORCINE) LOCK FLUSH IV SOLN 100 UNIT/ML 5 ML: 100 SOLUTION at 12:53

## 2023-01-01 RX ADMIN — HEPARIN SODIUM (PORCINE) LOCK FLUSH IV SOLN 100 UNIT/ML 500 UNITS: 100 SOLUTION at 11:11

## 2023-01-01 RX ADMIN — HYDROMORPHONE HYDROCHLORIDE 0.5 MG: 1 INJECTION, SOLUTION INTRAMUSCULAR; INTRAVENOUS; SUBCUTANEOUS at 11:49

## 2023-01-01 RX ADMIN — HEPARIN SODIUM (PORCINE) LOCK FLUSH IV SOLN 100 UNIT/ML 5 ML: 100 SOLUTION at 10:25

## 2023-01-01 RX ADMIN — HEPARIN SODIUM (PORCINE) LOCK FLUSH IV SOLN 100 UNIT/ML 500 UNITS: 100 SOLUTION at 08:11

## 2023-01-01 RX ADMIN — SODIUM CHLORIDE 1000 ML: 9 INJECTION, SOLUTION INTRAVENOUS at 16:05

## 2023-01-01 RX ADMIN — HEPARIN SODIUM (PORCINE) LOCK FLUSH IV SOLN 100 UNIT/ML 5 ML: 100 SOLUTION at 15:17

## 2023-01-01 RX ADMIN — HYDROMORPHONE HYDROCHLORIDE 1 MG: 1 INJECTION, SOLUTION INTRAMUSCULAR; INTRAVENOUS; SUBCUTANEOUS at 12:38

## 2023-01-01 RX ADMIN — METOCLOPRAMIDE 10 MG: 5 TABLET ORAL at 04:13

## 2023-01-01 RX ADMIN — HEPARIN SODIUM (PORCINE) LOCK FLUSH IV SOLN 100 UNIT/ML 5 ML: 100 SOLUTION at 14:26

## 2023-01-01 RX ADMIN — IOPAMIDOL 61 ML: 755 INJECTION, SOLUTION INTRAVENOUS at 11:20

## 2023-01-01 RX ADMIN — VENLAFAXINE HYDROCHLORIDE 225 MG: 150 CAPSULE, EXTENDED RELEASE ORAL at 19:02

## 2023-01-01 RX ADMIN — HEPARIN SODIUM (PORCINE) LOCK FLUSH IV SOLN 100 UNIT/ML 5 ML: 100 SOLUTION at 11:08

## 2023-01-01 RX ADMIN — HEPARIN SODIUM (PORCINE) LOCK FLUSH IV SOLN 100 UNIT/ML 5 ML: 100 SOLUTION at 14:12

## 2023-01-01 RX ADMIN — GADOBUTROL 5.5 ML: 604.72 INJECTION INTRAVENOUS at 18:07

## 2023-01-01 RX ADMIN — HYDROMORPHONE HYDROCHLORIDE 1 MG: 1 INJECTION, SOLUTION INTRAMUSCULAR; INTRAVENOUS; SUBCUTANEOUS at 05:44

## 2023-01-01 RX ADMIN — PROPRANOLOL HYDROCHLORIDE 80 MG: 80 CAPSULE, EXTENDED RELEASE ORAL at 10:02

## 2023-01-01 RX ADMIN — DIPHENHYDRAMINE HYDROCHLORIDE 25 MG: 25 CAPSULE ORAL at 04:13

## 2023-01-01 RX ADMIN — ACETAMINOPHEN 975 MG: 325 TABLET, FILM COATED ORAL at 19:01

## 2023-01-01 RX ADMIN — SODIUM CHLORIDE: 9 INJECTION, SOLUTION INTRAVENOUS at 12:12

## 2023-01-01 RX ADMIN — Medication 5 ML: at 16:22

## 2023-01-01 RX ADMIN — HEPARIN SODIUM (PORCINE) LOCK FLUSH IV SOLN 100 UNIT/ML 5 ML: 100 SOLUTION at 12:43

## 2023-01-01 RX ADMIN — HEPARIN SODIUM (PORCINE) LOCK FLUSH IV SOLN 100 UNIT/ML 500 UNITS: 100 SOLUTION at 12:44

## 2023-01-01 RX ADMIN — HYDROMORPHONE HYDROCHLORIDE 1 MG: 1 INJECTION, SOLUTION INTRAMUSCULAR; INTRAVENOUS; SUBCUTANEOUS at 22:51

## 2023-01-01 RX ADMIN — VENLAFAXINE HYDROCHLORIDE 225 MG: 150 CAPSULE, EXTENDED RELEASE ORAL at 08:39

## 2023-01-01 RX ADMIN — HEPARIN SODIUM (PORCINE) LOCK FLUSH IV SOLN 100 UNIT/ML 500 UNITS: 100 SOLUTION at 11:07

## 2023-01-01 RX ADMIN — SODIUM CHLORIDE 60 ML: 9 INJECTION, SOLUTION INTRAVENOUS at 11:20

## 2023-01-01 RX ADMIN — PROPRANOLOL HYDROCHLORIDE 160 MG: 80 CAPSULE, EXTENDED RELEASE ORAL at 19:01

## 2023-01-01 RX ADMIN — HYDROMORPHONE HYDROCHLORIDE 1 MG: 1 INJECTION, SOLUTION INTRAMUSCULAR; INTRAVENOUS; SUBCUTANEOUS at 19:40

## 2023-01-01 RX ADMIN — SODIUM CHLORIDE: 9 INJECTION, SOLUTION INTRAVENOUS at 21:39

## 2023-01-01 RX ADMIN — BUSPIRONE HYDROCHLORIDE 15 MG: 5 TABLET ORAL at 19:01

## 2023-01-01 RX ADMIN — ACETAMINOPHEN 975 MG: 325 TABLET, FILM COATED ORAL at 08:38

## 2023-01-01 RX ADMIN — HYDROMORPHONE HYDROCHLORIDE 0.5 MG: 1 INJECTION, SOLUTION INTRAMUSCULAR; INTRAVENOUS; SUBCUTANEOUS at 10:50

## 2023-01-01 RX ADMIN — LEVOFLOXACIN 500 MG: 500 TABLET, FILM COATED ORAL at 08:38

## 2023-01-01 RX ADMIN — HEPARIN SODIUM (PORCINE) LOCK FLUSH IV SOLN 100 UNIT/ML 5 ML: 100 SOLUTION at 15:41

## 2023-01-01 RX ADMIN — APIXABAN 5 MG: 5 TABLET, FILM COATED ORAL at 19:02

## 2023-01-01 RX ADMIN — ACETAMINOPHEN 975 MG: 325 TABLET, FILM COATED ORAL at 14:17

## 2023-01-01 RX ADMIN — IOPAMIDOL 77 ML: 755 INJECTION, SOLUTION INTRAVENOUS at 13:48

## 2023-01-01 RX ADMIN — FLUDEOXYGLUCOSE F-18 10.15 MILLICURIE: 500 INJECTION, SOLUTION INTRAVENOUS at 12:12

## 2023-01-01 RX ADMIN — POLYETHYLENE GLYCOL 3350 17 G: 17 POWDER, FOR SOLUTION ORAL at 08:39

## 2023-01-01 RX ADMIN — ONDANSETRON 4 MG: 4 TABLET, ORALLY DISINTEGRATING ORAL at 02:55

## 2023-01-01 RX ADMIN — HEPARIN 5 ML: 100 SYRINGE at 18:05

## 2023-01-01 RX ADMIN — HYDROMORPHONE HYDROCHLORIDE 0.5 MG: 1 INJECTION, SOLUTION INTRAMUSCULAR; INTRAVENOUS; SUBCUTANEOUS at 15:05

## 2023-01-01 RX ADMIN — IOPAMIDOL 70 ML: 755 INJECTION, SOLUTION INTRAVENOUS at 13:05

## 2023-01-01 RX ADMIN — TRAZODONE HYDROCHLORIDE 50 MG: 50 TABLET ORAL at 21:39

## 2023-01-01 RX ADMIN — HEPARIN SODIUM (PORCINE) LOCK FLUSH IV SOLN 100 UNIT/ML 500 UNITS: 100 SOLUTION at 11:05

## 2023-01-01 RX ADMIN — ONDANSETRON 4 MG: 2 INJECTION INTRAMUSCULAR; INTRAVENOUS at 10:52

## 2023-01-01 RX ADMIN — HYDROMORPHONE HYDROCHLORIDE 1 MG: 1 INJECTION, SOLUTION INTRAMUSCULAR; INTRAVENOUS; SUBCUTANEOUS at 01:23

## 2023-01-01 RX ADMIN — HEPARIN SODIUM (PORCINE) LOCK FLUSH IV SOLN 100 UNIT/ML 5 ML: 100 SOLUTION at 11:40

## 2023-01-01 RX ADMIN — KETOROLAC TROMETHAMINE 15 MG: 15 INJECTION, SOLUTION INTRAMUSCULAR; INTRAVENOUS at 16:08

## 2023-01-01 RX ADMIN — PROCHLORPERAZINE EDISYLATE 10 MG: 5 INJECTION INTRAMUSCULAR; INTRAVENOUS at 03:49

## 2023-01-01 RX ADMIN — HEPARIN SODIUM (PORCINE) LOCK FLUSH IV SOLN 100 UNIT/ML 5 ML: 100 SOLUTION at 10:08

## 2023-01-01 RX ADMIN — HEPARIN SODIUM (PORCINE) LOCK FLUSH IV SOLN 100 UNIT/ML 5 ML: 100 SOLUTION at 16:20

## 2023-01-01 RX ADMIN — BUSPIRONE HYDROCHLORIDE 15 MG: 5 TABLET ORAL at 08:38

## 2023-01-01 RX ADMIN — KETOROLAC TROMETHAMINE 15 MG: 30 INJECTION, SOLUTION INTRAMUSCULAR; INTRAVENOUS at 02:13

## 2023-01-01 RX ADMIN — ONDANSETRON 4 MG: 4 TABLET, ORALLY DISINTEGRATING ORAL at 18:33

## 2023-01-01 RX ADMIN — HEPARIN SODIUM (PORCINE) LOCK FLUSH IV SOLN 100 UNIT/ML 5 ML: 100 SOLUTION at 13:43

## 2023-01-01 RX ADMIN — APIXABAN 5 MG: 5 TABLET, FILM COATED ORAL at 08:39

## 2023-01-01 RX ADMIN — HYDROMORPHONE HYDROCHLORIDE 1 MG: 1 INJECTION, SOLUTION INTRAMUSCULAR; INTRAVENOUS; SUBCUTANEOUS at 03:42

## 2023-01-01 RX ADMIN — HEPARIN SODIUM (PORCINE) LOCK FLUSH IV SOLN 100 UNIT/ML 5 ML: 100 SOLUTION at 14:25

## 2023-01-01 RX ADMIN — MORPHINE SULFATE 15 MG: 15 TABLET ORAL at 12:56

## 2023-01-01 RX ADMIN — PROCHLORPERAZINE EDISYLATE 10 MG: 5 INJECTION INTRAMUSCULAR; INTRAVENOUS at 16:09

## 2023-01-01 RX ADMIN — FLUDEOXYGLUCOSE F-18 10.05 MILLICURIE: 500 INJECTION, SOLUTION INTRAVENOUS at 12:49

## 2023-01-01 RX ADMIN — LEVOFLOXACIN 500 MG: 500 TABLET, FILM COATED ORAL at 19:02

## 2023-01-01 RX ADMIN — HEPARIN SODIUM (PORCINE) LOCK FLUSH IV SOLN 100 UNIT/ML 500 UNITS: 100 SOLUTION at 12:49

## 2023-01-01 RX ADMIN — ONDANSETRON 4 MG: 4 TABLET, ORALLY DISINTEGRATING ORAL at 12:44

## 2023-01-01 RX ADMIN — POLYETHYLENE GLYCOL 3350 17 G: 17 POWDER, FOR SOLUTION ORAL at 19:00

## 2023-01-01 RX ADMIN — LORAZEPAM 1 MG: 2 INJECTION INTRAMUSCULAR; INTRAVENOUS at 16:29

## 2023-01-01 ASSESSMENT — ACTIVITIES OF DAILY LIVING (ADL)
NUMBER_OF_TIMES_PATIENT_HAS_FALLEN_WITHIN_LAST_SIX_MONTHS: 3
DRESSING/BATHING_DIFFICULTY: NO
CHANGE_IN_FUNCTIONAL_STATUS_SINCE_ONSET_OF_CURRENT_ILLNESS/INJURY: NO
PREVIOUS_RESPONSIBILITIES: MEAL PREP;HOUSEKEEPING;LAUNDRY;SHOPPING;MEDICATION MANAGEMENT;FINANCES;DRIVING
ADLS_ACUITY_SCORE: 24
ADLS_ACUITY_SCORE: 35
DEPENDENT_IADLS:: INDEPENDENT
WALKING_OR_CLIMBING_STAIRS_DIFFICULTY: YES
DIFFICULTY_EATING/SWALLOWING: NO
DOING_ERRANDS_INDEPENDENTLY_DIFFICULTY: NO
FALL_HISTORY_WITHIN_LAST_SIX_MONTHS: YES
ADLS_ACUITY_SCORE: 24
ADLS_ACUITY_SCORE: 35
CONCENTRATING,_REMEMBERING_OR_MAKING_DECISIONS_DIFFICULTY: NO
TRANSFERRING: 1-->ASSISTANCE (EQUIPMENT/PERSON) NEEDED (NOT DEVELOPMENTALLY APPROPRIATE)
ADLS_ACUITY_SCORE: 24
ADLS_ACUITY_SCORE: 24
ADLS_ACUITY_SCORE: 35
ADLS_ACUITY_SCORE: 35
ADLS_ACUITY_SCORE: 24
ADLS_ACUITY_SCORE: 35
ADLS_ACUITY_SCORE: 35
TRANSFERRING: 1-->ASSISTANCE (EQUIPMENT/PERSON) NEEDED
WALKING_OR_CLIMBING_STAIRS: AMBULATION DIFFICULTY, ASSISTANCE 1 PERSON
ADLS_ACUITY_SCORE: 35
DIFFICULTY_COMMUNICATING: NO
WEAR_GLASSES_OR_BLIND: YES
TOILETING_ISSUES: NO
ADLS_ACUITY_SCORE: 24
ADLS_ACUITY_SCORE: 24
HEARING_DIFFICULTY_OR_DEAF: NO
VISION_MANAGEMENT: GLASSES
ADLS_ACUITY_SCORE: 24
ADLS_ACUITY_SCORE: 35
ADLS_ACUITY_SCORE: 24

## 2023-01-01 ASSESSMENT — MIGRAINE DISABILITY ASSESSMENT (MIDAS)
TOTAL SCORE: 195
HOW MANY DAYS WAS YOUR PRODUCTIVITY CUT IN HALF BECAUSE OF HEADACHES: 20
ON A SCALE FROM 0-10 ON AVERAGE HOW PAINFUL WERE HEADACHES: 8
HOW OFTEN WERE SOCIAL ACTIVITIES MISSED DUE TO HEADACHES: 40
HOW MANY DAYS IN THE PAST 3 MONTHS HAVE YOU HAD A HEADACHE: 70
HOW MANY DAYS DID YOU NOT DO HOUSEWORK BECAUSE OF HEADACHES: 60
HOW MANY DAYS DID YOU MISS WORK OR SCHOOL BECAUSE OF HEADACHES: 45
HOW MANY DAYS WAS HOUSEWORK PRODUCTIVITY CUT IN HALF DUE TO HEADACHES: 30

## 2023-01-01 ASSESSMENT — PAIN SCALES - GENERAL
PAINLEVEL: MODERATE PAIN (4)
PAINLEVEL: MODERATE PAIN (4)
PAINLEVEL: NO PAIN (0)
PAINLEVEL: MILD PAIN (3)
PAINLEVEL: MILD PAIN (3)
PAINLEVEL: MODERATE PAIN (4)
PAINLEVEL: SEVERE PAIN (6)
PAINLEVEL: EXTREME PAIN (9)
PAINLEVEL: NO PAIN (0)
PAINLEVEL: MODERATE PAIN (4)
PAINLEVEL: NO PAIN (1)
PAINLEVEL: SEVERE PAIN (7)
PAINLEVEL: MODERATE PAIN (4)
PAINLEVEL: MILD PAIN (2)
PAINLEVEL: MILD PAIN (2)
PAINLEVEL: MILD PAIN (3)

## 2023-01-01 ASSESSMENT — PAIN SCALES - PAIN ENJOYMENT GENERAL ACTIVITY SCALE (PEG)
INTERFERED_GENERAL_ACTIVITY: 10 - COMPLETELY INTERFERES
AVG_PAIN_PASTWEEK: 7
PEG_TOTALSCORE: 8
INTERFERED_ENJOYMENT_LIFE: 7

## 2023-01-01 ASSESSMENT — ANXIETY QUESTIONNAIRES
IF YOU CHECKED OFF ANY PROBLEMS ON THIS QUESTIONNAIRE, HOW DIFFICULT HAVE THESE PROBLEMS MADE IT FOR YOU TO DO YOUR WORK, TAKE CARE OF THINGS AT HOME, OR GET ALONG WITH OTHER PEOPLE: SOMEWHAT DIFFICULT
3. WORRYING TOO MUCH ABOUT DIFFERENT THINGS: NOT AT ALL
GAD7 TOTAL SCORE: 2
5. BEING SO RESTLESS THAT IT IS HARD TO SIT STILL: NOT AT ALL
4. TROUBLE RELAXING: SEVERAL DAYS
2. NOT BEING ABLE TO STOP OR CONTROL WORRYING: NOT AT ALL
6. BECOMING EASILY ANNOYED OR IRRITABLE: SEVERAL DAYS
1. FEELING NERVOUS, ANXIOUS, OR ON EDGE: NOT AT ALL
7. FEELING AFRAID AS IF SOMETHING AWFUL MIGHT HAPPEN: NOT AT ALL
GAD7 TOTAL SCORE: 2

## 2023-01-01 ASSESSMENT — HEADACHE IMPACT TEST (HIT 6)
HOW OFTEN DID HEADACHS LIMIT CONCENTRATION ON WORK OR DAILY ACTIVITY: VERY OFTEN
HOW OFTEN HAVE YOU FELT TOO TIRED TO WORK BECAUSE OF YOUR HEADACHES: VERY OFTEN
WHEN YOU HAVE HEADACHES HOW OFTEN IS THE PAIN SEVERE: VERY OFTEN
HOW OFTEN DID HEADACHS LIMIT CONCENTRATION ON WORK OR DAILY ACTIVITY: VERY OFTEN
HOW OFTEN HAVE YOU FELT FED UP OR IRRITATED BECAUSE OF YOUR HEADACHES: VERY OFTEN
HIT6 TOTAL SCORE: 58
HOW OFTEN DID HEADACHS LIMIT CONCENTRATION ON WORK OR DAILY ACTIVITY: RARELY
HIT6 TOTAL SCORE: 66
WHEN YOU HAVE HEADACHES HOW OFTEN IS THE PAIN SEVERE: ALWAYS
HOW OFTEN HAVE YOU FELT FED UP OR IRRITATED BECAUSE OF YOUR HEADACHES: ALWAYS
HOW OFTEN DO HEADACHES LIMIT YOUR DAILY ACTIVITIES: VERY OFTEN
HOW OFTEN HAVE YOU FELT TOO TIRED TO WORK BECAUSE OF YOUR HEADACHES: SOMETIMES
HOW OFTEN HAVE YOU FELT TOO TIRED TO WORK BECAUSE OF YOUR HEADACHES: RARELY
HOW OFTEN DO HEADACHES LIMIT YOUR DAILY ACTIVITIES: SOMETIMES
HOW OFTEN DO HEADACHES LIMIT YOUR DAILY ACTIVITIES: VERY OFTEN
WHEN YOU HAVE HEADACHES HOW OFTEN IS THE PAIN SEVERE: VERY OFTEN
WHEN YOU HAVE A HEADACHE HOW OFTEN DO YOU WISH YOU COULD LIE DOWN: VERY OFTEN
HIT6 TOTAL SCORE: 69
WHEN YOU HAVE A HEADACHE HOW OFTEN DO YOU WISH YOU COULD LIE DOWN: VERY OFTEN
HOW OFTEN HAVE YOU FELT FED UP OR IRRITATED BECAUSE OF YOUR HEADACHES: SOMETIMES
WHEN YOU HAVE A HEADACHE HOW OFTEN DO YOU WISH YOU COULD LIE DOWN: VERY OFTEN

## 2023-01-02 ASSESSMENT — HEADACHE IMPACT TEST (HIT 6)
HOW OFTEN DO HEADACHES LIMIT YOUR DAILY ACTIVITIES: VERY OFTEN
HOW OFTEN DID HEADACHS LIMIT CONCENTRATION ON WORK OR DAILY ACTIVITY: VERY OFTEN
WHEN YOU HAVE HEADACHES HOW OFTEN IS THE PAIN SEVERE: VERY OFTEN
HOW OFTEN HAVE YOU FELT FED UP OR IRRITATED BECAUSE OF YOUR HEADACHES: VERY OFTEN
HIT6 TOTAL SCORE: 67
WHEN YOU HAVE A HEADACHE HOW OFTEN DO YOU WISH YOU COULD LIE DOWN: ALWAYS
HOW OFTEN HAVE YOU FELT TOO TIRED TO WORK BECAUSE OF YOUR HEADACHES: SOMETIMES

## 2023-01-04 ENCOUNTER — VIRTUAL VISIT (OUTPATIENT)
Dept: ONCOLOGY | Facility: CLINIC | Age: 63
End: 2023-01-04
Payer: MEDICARE

## 2023-01-04 DIAGNOSIS — G89.3 CANCER ASSOCIATED PAIN: ICD-10-CM

## 2023-01-04 DIAGNOSIS — M53.3 SACROILIAC JOINT PAIN: Primary | ICD-10-CM

## 2023-01-04 PROCEDURE — 99214 OFFICE O/P EST MOD 30 MIN: CPT | Mod: 95 | Performed by: HOSPITALIST

## 2023-01-04 NOTE — LETTER
1/4/2023         RE: Shaneka Alvarez  [address] [facility] [place] [address]        Dear Colleague,    Thank you for referring your patient, Shaneka Alvarez, to the [facility]. Please see a copy of my visit note below.    Shaneka is a 62 year old who is being evaluated via a billable video visit.      How would you like to obtain your AVS? MyChart  If the video visit is dropped, the invitation should be resent by: Text to cell phone: [phone]  Will anyone else be joining your video visit? Claudia NEWMAN ODETTE      Palliative Care Outpatient Clinic Progress Note    Patient Name: Shaneka Alvarze is being evaluated via a billable video visit.    Primary Provider:  [facility], [facility]  Primary Oncologist: Dr Zepeda  Last seen by palliative care: myself, 7/6/22      Chief Complaint: migraines are getting worse    Background/Summary  Medical:  - breast cancer ER+/CT+, Her2 neg diagnosed in 2006              - s/p systemic treatment with adriamycin, cytoxan, avastin, aromatase inhibitor as well as b/l mastectomy              - relapsed metastatic disease found in skull, vertebrae complicated by pathological fractures L1, L5              - s/p XRT to T12-L5 Sept 2019. Didn't tolerate Xeloda, paclitaxel Nov 2019-Jan 2020, treated with eribulin and zometa. PD seen on PET Nov 2020 as well as a new L 4th rib concerning for a pathologic fracture. Started Trodelvy 11/11/20, PD Dec 21, switched to casodex.               - PD again on PET/CT March 2022 including b/l iliac crest lesions, R clavicle, and multifocal T spine involvement. Switched to single agent Doxil April 2022, dose decrease to 80% at 2nd cycle in light of side effects. Tolerated that well. Complete response on scan Oct 2022  - b/l PE found on PET July 2020, on anticoagulation  - MS with mild right-sided weakness    - long-standing history of migraines, follows with neurology. On monthly aimovig with ubrelvy prn  -  post-zoster neuralgia (ED Visit 2022), completely resolved         Social  She lives with her    6 adult children, 1 lives locally  Works as a middle-.     Psychospiritual  Has been through traumatic experiences in the past. Addressed these with counselors prior to her diagnosis. Doing quite well now.     Care Planning   POLST completed 20: DNR/selective treatments     Opioid Safety   Expected prognosis: > 1 year  Risk: Low (ORT 0)  Indication for Opioid Use: Moderate or Strong  Naloxone Script provided if patient also on benzodiazepine: yes  Indications for Tapering reviewed: good dose decrease since on buprenorphine     : reviewed, ok    Interim History:  At the last visit we discussed pain: migraines controlled with Aimovig; PHN was improving on gabapentin; cancer-related pain controlled with buprenorphine    Patient is on the call by herself  History gathered today from: patient, medical chart    She has had worse migraines in the past two months, has an appointment with neurology tomorrow. She has been on aimovig monthly since summer and it initially worked well. Insurance didn't cover ubrelvy for a period of time. She just received a new script. She has been taking a triptane meanwhile.     She has had ongoing pain in her SI joints, which has been gradually getting worse. She is still taking belbuca 150mcg bid, rarely needs morphine, more frequently lately, still only a few times a month.   The pain has started interfering with sleep and she has considered taking MSIR more frequently.   Sometimes she applies lidocaine patches or voltaren, both with very limited effect.   Pain tends to get worse after longer or busier days.   She did PT about 2 years ago, still does the exercises at times.       Impression & Recommendations & Counseliny/o woman with metastatic breast cancer complicated by migraines, chronic joint pain    Migraines: await neurology input    SI Joint pain:  gradually progressive. Was originally related to metastatic disease, now likely subsequent non-malignant musculoskeletal changes  - referral to PT  - increase belbuca to 150-0-300      Return to clinic in 2-3 months    Data / Chart Review:    Review of Systems:   ROS: 10 point ROS neg other than the symptoms noted above in the HPI and pertinents here.         Physical Exam:   Physical Exam:  Vital Signs not checked, virtual visit    CONSTIT: awake, appears comfortable  EENT: MMM, EOMI, no icterus  RESP: reg, nl effort, no cough  MSK: moves x4  SKIN: no rash, no obvious lesions  NEURO: alert, oriented x3  PSYCH: appropriate affect, memory and thought process intact    The rest of a comprehensive physical examination is deferred  due to public health emergency video visit restrictions.      Current pertinent medications:  Belbuca 150mcg bid  MSIR 15-30mg q3h prn              Naloxone prescribed  acetaminophen 1000mg tid  Ibuprofen 600mg q6h prn  CBD cream     Dexamethasone 4mg q12h     Venlafaxine 225mg daily  Lorazepam 1mg prn  Trazodone 50mg HS  Buspirone 15mg bid      No pertinent allergies      Lab and imaging data reviewed:  Comments:   No new bony changes on most recent scans         Video-Visit Details    Type of service:  Video Visit    Physician has received verbal consent for a Video Visit from the patient? Yes    Video Start Time: 1626    Video End Time (time video stopped): 1652    Originating Location (pt. Location): Home    Distant Location (provider location):  [facility]   Distant Location (provider location):  Off-site    Mode of Communication:  Video Conference via [employer]    Alva Whitaker MD  Palliative Medicine  Pager [phone]        Again, thank you for allowing me to participate in the care of your patient.        Sincerely,        Alva Whitaker MD

## 2023-01-04 NOTE — PROGRESS NOTES
Shaneka is a 62 year old who is being evaluated via a billable video visit.      How would you like to obtain your AVS? MyChart  If the video visit is dropped, the invitation should be resent by: Text to cell phone: [phone]  Will anyone else be joining your video visit? Claudia NEWMAN ODETTE      Palliative Care Outpatient Clinic Progress Note    Patient Name: Shaneka Alvarez is being evaluated via a billable video visit.    Primary Provider:  [facility], [facility]  Primary Oncologist: Dr Zepeda  Last seen by palliative care: myself, 7/6/22      Chief Complaint: migraines are getting worse    Background/Summary  Medical:  - breast cancer ER+/MT+, Her2 neg diagnosed in 2006              - s/p systemic treatment with adriamycin, cytoxan, avastin, aromatase inhibitor as well as b/l mastectomy              - relapsed metastatic disease found in skull, vertebrae complicated by pathological fractures L1, L5              - s/p XRT to T12-L5 Sept 2019. Didn't tolerate Xeloda, paclitaxel Nov 2019-Jan 2020, treated with eribulin and zometa. PD seen on PET Nov 2020 as well as a new L 4th rib concerning for a pathologic fracture. Started Trodelvy 11/11/20, PD Dec 21, switched to casodex.               - PD again on PET/CT March 2022 including b/l iliac crest lesions, R clavicle, and multifocal T spine involvement. Switched to single agent Doxil April 2022, dose decrease to 80% at 2nd cycle in light of side effects. Tolerated that well. Complete response on scan Oct 2022  - b/l PE found on PET July 2020, on anticoagulation  - MS with mild right-sided weakness    - long-standing history of migraines, follows with neurology. On monthly aimovig with ubrelvy prn  - post-zoster neuralgia (ED Visit July 2022), completely resolved         Social  She lives with her    6 adult children, 1 lives locally  Works as a middle-.     Psychospiritual  Has been through traumatic experiences in the past. Addressed  these with counselors prior to her diagnosis. Doing quite well now.     Care Planning   POLST completed 20: DNR/selective treatments     Opioid Safety   Expected prognosis: > 1 year  Risk: Low (ORT 0)  Indication for Opioid Use: Moderate or Strong  Naloxone Script provided if patient also on benzodiazepine: yes  Indications for Tapering reviewed: good dose decrease since on buprenorphine     : reviewed, ok    Interim History:  At the last visit we discussed pain: migraines controlled with Aimovig; PHN was improving on gabapentin; cancer-related pain controlled with buprenorphine    Patient is on the call by herself  History gathered today from: patient, medical chart    She has had worse migraines in the past two months, has an appointment with neurology tomorrow. She has been on aimovig monthly since summer and it initially worked well. Insurance didn't cover ubrelvy for a period of time. She just received a new script. She has been taking a triptane meanwhile.     She has had ongoing pain in her SI joints, which has been gradually getting worse. She is still taking belbuca 150mcg bid, rarely needs morphine, more frequently lately, still only a few times a month.   The pain has started interfering with sleep and she has considered taking MSIR more frequently.   Sometimes she applies lidocaine patches or voltaren, both with very limited effect.   Pain tends to get worse after longer or busier days.   She did PT about 2 years ago, still does the exercises at times.       Impression & Recommendations & Counseliny/o woman with metastatic breast cancer complicated by migraines, chronic joint pain    Migraines: await neurology input    SI Joint pain: gradually progressive. Was originally related to metastatic disease, now likely subsequent non-malignant musculoskeletal changes  - referral to PT  - increase belbuca to 150-0-300      Return to clinic in 2-3 months    Data / Chart Review:    Review of  Systems:   ROS: 10 point ROS neg other than the symptoms noted above in the HPI and pertinents here.         Physical Exam:   Physical Exam:  Vital Signs not checked, virtual visit    CONSTIT: awake, appears comfortable  EENT: MMM, EOMI, no icterus  RESP: reg, nl effort, no cough  MSK: moves x4  SKIN: no rash, no obvious lesions  NEURO: alert, oriented x3  PSYCH: appropriate affect, memory and thought process intact    The rest of a comprehensive physical examination is deferred  due to public health emergency video visit restrictions.      Current pertinent medications:  Belbuca 150mcg bid  MSIR 15-30mg q3h prn              Naloxone prescribed  acetaminophen 1000mg tid  Ibuprofen 600mg q6h prn  CBD cream     Dexamethasone 4mg q12h     Venlafaxine 225mg daily  Lorazepam 1mg prn  Trazodone 50mg HS  Buspirone 15mg bid      No pertinent allergies      Lab and imaging data reviewed:  Comments:   No new bony changes on most recent scans         Video-Visit Details    Type of service:  Video Visit    Physician has received verbal consent for a Video Visit from the patient? Yes    Video Start Time: 1626    Video End Time (time video stopped): 1652    Originating Location (pt. Location): Home    Distant Location (provider location):  [facility]   Distant Location (provider location):  Off-site    Mode of Communication:  Video Conference via [facility]    Alva Whitaker MD  Palliative Medicine  Pager [phone]

## 2023-01-04 NOTE — PATIENT INSTRUCTIONS
Patient Instructions      Expand All Collapse All    Thank you for attending the Palliative Care Clinic appointment today.     1. Increase belbuca:  - continue placing one of the current films (150mcg) into your cheek in the mornings   - place one of the new films (300mcg)  into your cheek in the evenings      Call if your symptoms change and the medications we have prescribed are not working.   Do not take your medications at any other dose or frequently than how they are prescribed. Call if you think we should make any changes.    Call us at least 3 workdays in advance if you need a medication refill.    Please have all your pill bottles ready for your next appointment.     Return to clinic in 2-3 months for a follow up.     You can reach the Palliative Care Team during business hours at the following number:    - [phone]  - or send me a [employer] message    To reach the Palliative Care Provider on-call After-hours or on holidays and weekends, call: [phone].  Please note that we are not able to provide pain medication refills on evenings or weekends.

## 2023-01-05 ENCOUNTER — VIRTUAL VISIT (OUTPATIENT)
Dept: NEUROLOGY | Facility: CLINIC | Age: 63
End: 2023-01-05
Payer: COMMERCIAL

## 2023-01-05 DIAGNOSIS — G43.709 CHRONIC MIGRAINE WITHOUT AURA WITHOUT STATUS MIGRAINOSUS, NOT INTRACTABLE: ICD-10-CM

## 2023-01-05 PROCEDURE — 99214 OFFICE O/P EST MOD 30 MIN: CPT | Mod: GT | Performed by: NURSE PRACTITIONER

## 2023-01-05 RX ORDER — UBROGEPANT 50 MG/1
50 TABLET ORAL PRN
COMMUNITY
Start: 2022-12-29 | End: 2023-01-05

## 2023-01-05 RX ORDER — ERENUMAB-AOOE 140 MG/ML
140 INJECTION, SOLUTION SUBCUTANEOUS
Qty: 1 ML | Refills: 11 | Status: SHIPPED | OUTPATIENT
Start: 2023-01-05 | End: 2023-01-01

## 2023-01-05 RX ORDER — PROMETHAZINE HYDROCHLORIDE 25 MG/1
25 TABLET ORAL PRN
COMMUNITY
Start: 2022-12-11 | End: 2023-01-01

## 2023-01-05 RX ORDER — ONDANSETRON 4 MG/1
4 TABLET, ORALLY DISINTEGRATING ORAL EVERY 8 HOURS PRN
COMMUNITY
Start: 2022-12-11 | End: 2023-01-01

## 2023-01-05 ASSESSMENT — MIGRAINE DISABILITY ASSESSMENT (MIDAS)
HOW MANY DAYS DID YOU MISS WORK OR SCHOOL BECAUSE OF HEADACHES: 0
HOW MANY DAYS WAS YOUR PRODUCTIVITY CUT IN HALF BECAUSE OF HEADACHES: 0
HOW MANY DAYS IN THE PAST 3 MONTHS HAVE YOU HAD A HEADACHE: 60
HOW OFTEN WERE SOCIAL ACTIVITIES MISSED DUE TO HEADACHES: 3
HOW MANY DAYS DID YOU NOT DO HOUSEWORK BECAUSE OF HEADACHES: 60
ON A SCALE FROM 0-10 ON AVERAGE HOW PAINFUL WERE HEADACHES: 8
TOTAL SCORE: 63
HOW MANY DAYS WAS HOUSEWORK PRODUCTIVITY CUT IN HALF DUE TO HEADACHES: 0

## 2023-01-05 NOTE — NURSING NOTE
Pt states no changes to allergies or medications since last reviewed by staff 1 days ago. [facility]

## 2023-01-05 NOTE — LETTER
1/5/2023       RE: Shaneka Alvarez  [address] [facility] [address]     Dear Colleague,    Thank you for referring your patient, Shaneka Alvarez, to the [facility]. Please see a copy of my visit note below.    Shaneka is a 62 year old who is being evaluated via a billable video visit.      How would you like to obtain your AVS? MyChart  If the video visit is dropped, the invitation should be resent by: Send to e-mail at: [email or web address]  Will anyone else be joining your video visit? No        Video-Visit Details    Type of service:  Video Visit   Video Start Time: 11:04 AM  Video End Time:11:33 AM  Originating Location (pt. Location): Home  Distant Location (provider location):  Off-site  Platform used for Video Visit: [employer]     [facility] follow up visit  Last follow up visit 5/24/2022, see note for details  Patient reports that between May and Oct -migraines were manageable.   Worsen in the past 2 months.   Has been taking Aimoving 70 mg monthly subcutaneous since May and no longer effective as it used to be. No side effects.  In the last 60 days about 40 migraine headaches with more than one day duration.   No new symptoms.   Oncology note reviewed and stable at this time.   Headaches temples and a forehead. Throbs/pounds. Prevents from doing things. headaches worse with any kind of movement.   Nausea and vomiting and takes prochlorperazine and ondansetron.   Naratriptan - often and second dose is effective. Takes it about 3 days per week   On propranolol, venlafaxine, trazodone, Eliquis, burpar.     Patient takes buprenorphine, and occasional morphine     Saw Dr Quiles last in 2019 and MS under control.       Plan:  Increase Aimovig 140 mg monthly subcutaneous for migraine prevention -side effects -constipation, hypertension   Rescue treatment -Ubrelvy as needed and/or naratriptan as needed    Prochlorperazine +benedryl as needed for nausea/vomiting/migraine   Follow up in 3 months or sooner if needed via MyChart   If headaches were getting worse and do not respond to treatment recommended to repeat brain MRI for interval changes.   Patient has been followed by oncology closely.     Patient is alert and no in apparent acute distress,  mentation appears normal, judgement and insight intact, normal speech, no weakness, facial expressions symmetrical, no ptosis    I discussed all my recommendations with Shaneka Alvarez who verbalizes understanding and comfortable with the plan.  All of patient's questions were answered from the best of my knowledge.  Patient is in agreement with the plan.     35 minutes spent on the date of the encounter doing video access, chart  review,  meds review, treatment plan, documentation and further activities as noted above          Again, thank you for allowing me to participate in the care of your patient.      Sincerely,    GUTIERREZ Dunn CNP

## 2023-01-05 NOTE — PROGRESS NOTES
Shaneka is a 62 year old who is being evaluated via a billable video visit.      How would you like to obtain your AVS? [employer]  If the video visit is dropped, the invitation should be resent by: Send to e-mail at: [email or web address]  Will anyone else be joining your video visit? No        Video-Visit Details    Type of service:  Video Visit   Video Start Time: 11:04 AM  Video End Time:11:33 AM  Originating Location (pt. Location): Home  Distant Location (provider location):  Off-site  Platform used for Video Visit: [employer]     [facility] follow up visit  Last follow up visit 5/24/2022, see note for details  Patient reports that between May and Oct -migraines were manageable.   Worsen in the past 2 months.   Has been taking Aimoving 70 mg monthly subcutaneous since May and no longer effective as it used to be. No side effects.  In the last 60 days about 40 migraine headaches with more than one day duration.   No new symptoms.   Oncology note reviewed and stable at this time.   Headaches temples and a forehead. Throbs/pounds. Prevents from doing things. headaches worse with any kind of movement.   Nausea and vomiting and takes prochlorperazine and ondansetron.   Naratriptan - often and second dose is effective. Takes it about 3 days per week   On propranolol, venlafaxine, trazodone, Eliquis, burpar.     Patient takes buprenorphine, and occasional morphine     Saw Dr Quiles last in 2019 and MS under control.       Plan:  Increase Aimovig 140 mg monthly subcutaneous for migraine prevention -side effects -constipation, hypertension   Rescue treatment -Ubrelvy as needed and/or naratriptan as needed   Prochlorperazine +benedryl as needed for nausea/vomiting/migraine   Follow up in 3 months or sooner if needed via [employer]   If headaches were getting worse and do not respond to treatment recommended to repeat brain MRI for interval changes.   Patient has been followed by oncology closely.     Patient is alert and  no in apparent acute distress,  mentation appears normal, judgement and insight intact, normal speech, no weakness, facial expressions symmetrical, no ptosis    I discussed all my recommendations with Shaneka Alvarez who verbalizes understanding and comfortable with the plan.  All of patient's questions were answered from the best of my knowledge.  Patient is in agreement with the plan.     35 minutes spent on the date of the encounter doing video access, chart  review,  meds review, treatment plan, documentation and further activities as noted above    GUTIERREZ Larsen, CNP [facility]  Headache certified  [facility]

## 2023-01-06 ENCOUNTER — TELEPHONE (OUTPATIENT)
Dept: NEUROLOGY | Facility: CLINIC | Age: 63
End: 2023-01-06

## 2023-01-06 NOTE — TELEPHONE ENCOUNTER
Prior Authorization Retail Medication Request    Medication/Dose: erenumab-aooe (AIMOVIG) 140 MG/ML injection  ICD code (if different than what is on RX):    Previously Tried and Failed:    Rationale:     Insurance Name:  Medicare  Insurance ID: [id number]        Pharmacy Information (if different than what is on RX)  Name:    Phone:         WAIT FOR CLINIC NOTE TO BE COMPLETED

## 2023-01-06 NOTE — TELEPHONE ENCOUNTER
1st attempt to schedule pt for video visit follow-up 3months. Scheduled with pt for 05/24/2023 at 0930

## 2023-01-08 ENCOUNTER — TELEPHONE (OUTPATIENT)
Dept: NEUROLOGY | Facility: CLINIC | Age: 63
End: 2023-01-08

## 2023-01-08 NOTE — TELEPHONE ENCOUNTER
Prior Authorization Retail Medication Request  DOSE INCREASE    Medication/Dose: Aimovig 140 mg every 30 days  ICD code (if different than what is on RX):  43.709  Previously Tried and Failed:  propranolol, venlafaxine, trazodone, Eliquis, burpar.    Rationale:  Has been taking Aimoving 70 mg monthly subcutaneous since May and no longer effective as it used to be. No side effects.  In the last 60 days about 40 migraine headaches with more than one day duration    Insurance Name:  Medicare  Insurance ID:  [id number]     Pharmacy Information (if different than what is on RX)  Name:    Phone:

## 2023-01-09 ENCOUNTER — TELEPHONE (OUTPATIENT)
Dept: NEUROLOGY | Facility: CLINIC | Age: 63
End: 2023-01-09

## 2023-01-09 NOTE — TELEPHONE ENCOUNTER
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  Venlafaxine helpful for anxiety/depression but not headaches  Naproxen as needed   Propranolol 60 mg ER -unsure of the effectiveness  Buspar for anxiety and headaches  Gabapentin-caused \"stomach sickness\"  Chlorpromazine -for nausea and has been helpful  Tizanidine -\"does not remember\"  bystolic  sumavel  Amitriptyline   Depakote  Verapamil  Petadolax 75 mg twice daily and was not effective  Physical therapy in the past and unsure of the effectiveness  Have not tried Cefaly  Aimovig  for a few month and stopped 3 years ago     Rationale:  Chronic migraine     Insurance Name:  Medicare  Insurance ID:  [id number]     Pharmacy Information (if different than what is on RX)  Name:    Phone:    "

## 2023-01-10 ENCOUNTER — TELEPHONE (OUTPATIENT)
Dept: PALLIATIVE CARE | Facility: CLINIC | Age: 63
End: 2023-01-10

## 2023-01-10 NOTE — TELEPHONE ENCOUNTER
PA Initiation    Medication: Buprenorphine HCl (BELBUCA) 300 MCG FILM buccal film   Insurance Company: [employer] ([facility]) - Phone [phone] Fax [phone]  Pharmacy Filling the Rx: [facility] [address] [place] [id number] [facility]  Filling Pharmacy Phone: [phone]  Filling Pharmacy Fax: [phone]  Start Date: 1/10/2023

## 2023-01-10 NOTE — TELEPHONE ENCOUNTER
Fax from [facility] [place] stating [facility] requires a prior auth call [phone]        Note this med is prescribed as 2 different doses.  300 and 150  Both need prior auths

## 2023-01-10 NOTE — TELEPHONE ENCOUNTER
Fax from [facility] [place] stating [facility] requires a prior auth call [phone]

## 2023-01-10 NOTE — TELEPHONE ENCOUNTER
PA Initiation    Medication: Buprenorphine HCl (BELBUCA) 150 MCG FILM buccal film   Insurance Company: [employer] ([facility]) - Phone [phone] Fax [phone]  Pharmacy Filling the Rx: [facility] [address] [place] [address] [facility]  Filling Pharmacy Phone: [phone]  Filling Pharmacy Fax: [phone]  Start Date: 1/10/2023

## 2023-01-11 ENCOUNTER — PATIENT OUTREACH (OUTPATIENT)
Dept: ONCOLOGY | Facility: CLINIC | Age: 63
End: 2023-01-11

## 2023-01-11 DIAGNOSIS — G89.3 CANCER ASSOCIATED PAIN: ICD-10-CM

## 2023-01-11 NOTE — TELEPHONE ENCOUNTER
Prior Authorization Approval    Authorization Effective Date: 1/10/2023  Authorization Expiration Date: 1/10/2025  Medication: Buprenorphine HCl (BELBUCA) 150 MCG FILM buccal film--APPROVED  Approved Dose/Quantity:   Reference #:     Insurance Company: [employer] ([facility]) - Phone [phone] Fax [phone]  Expected CoPay:       CoPay Card Available:      Foundation Assistance Needed:    Which Pharmacy is filling the prescription (Not needed for infusion/clinic administered): [facility] [address] [place] [address] [facility]  Pharmacy Notified: Yes  Patient Notified: Yes **Instructed pharmacy to notify patient when script is ready to /ship.**    PHARMACY NEEDS SCRIPT FOR THIS ONE

## 2023-01-11 NOTE — PROGRESS NOTES
[facility]: Palliative Care                                                                                        On 1/4 dose increased to 150 in the am and 300 at night.  Spoke with patient today and she has NOT increased her dose yet.  Not sure why.  Prior auths for both 150 and 300 films approved today.      Pharmacy request for 150mcg films    Last date written and prescribing provider:  12/16/22 [place]    Last office visit: 1/4/2023  Next office visit:  3/8/2023     reviewed with no concerns  Last sold per :  Last filled  12/16/2022    Jennifer Jha LPN  Palliative  Care Coordination  [phone]

## 2023-01-11 NOTE — TELEPHONE ENCOUNTER
Prior Authorization Approval    Authorization Effective Date: 1/11/2023  Authorization Expiration Date: 7/10/2023  Medication: erenumab-aooe (AIMOVIG) 140 MG/ML injection--APPROVED  Approved Dose/Quantity:   Reference #:     Insurance Company: [employer] ([facility]) - Phone [phone] Fax [phone]  Expected CoPay:       CoPay Card Available:      Foundation Assistance Needed:    Which Pharmacy is filling the prescription (Not needed for infusion/clinic administered): [facility] [address] [place] [address] [facility]  Pharmacy Notified: Yes  Patient Notified: Yes **Instructed pharmacy to notify patient when script is ready to /ship.**

## 2023-01-11 NOTE — TELEPHONE ENCOUNTER
Central Prior Authorization Team   Phone: [phone]    PA Initiation    Medication: Ubrelvy 50 mg  Insurance Company: [employer] ([facility]) - Phone [phone] Fax [phone]  Pharmacy Filling the Rx: [facility] [address] [place] [address] [facility]  Filling Pharmacy Phone: [phone]  Filling Pharmacy Fax: [phone]  Start Date: 1/11/2023

## 2023-01-11 NOTE — TELEPHONE ENCOUNTER
Prior Authorization Approval    Authorization Effective Date: 1/10/2023  Authorization Expiration Date: 1/10/2025  Medication: Buprenorphine HCl (BELBUCA) 300 MCG FILM buccal film--APPROVED  Approved Dose/Quantity:   Reference #:     Insurance Company: [employer] ([facility]) - Phone [phone] Fax [phone]  Expected CoPay:       CoPay Card Available:      Foundation Assistance Needed:    Which Pharmacy is filling the prescription (Not needed for infusion/clinic administered): [facility] [address] [place] [address] [facility]  Pharmacy Notified: Yes  Patient Notified: Yes **Instructed pharmacy to notify patient when script is ready to /ship.**

## 2023-01-11 NOTE — TELEPHONE ENCOUNTER
PA Initiation    Medication: erenumab-aooe (AIMOVIG) 140 MG/ML injection  Insurance Company: [employer] ([facility]) - Phone [phone] Fax [phone]  Pharmacy Filling the Rx: [facility] [address] [place] [address] [facility]  Filling Pharmacy Phone: [phone]  Filling Pharmacy Fax: [phone]  Start Date: 1/10/2023

## 2023-01-12 NOTE — TELEPHONE ENCOUNTER
Prior Authorization Approval    Authorization Effective Date: 1/11/2023  Authorization Expiration Date: 1/11/2024  Medication: Ubrelvy 50 mg-PA APPROVED   Approved Dose/Quantity: UP TO 8 TABLETS PER MONTH  Reference #:     Insurance Company: [employer] ([facility]) - Phone [phone] Fax [phone]  Expected CoPay:       CoPay Card Available:      Foundation Assistance Needed:    Which Pharmacy is filling the prescription (Not needed for infusion/clinic administered): [facility] [address] [place] [id number] [facility]  Pharmacy Notified: Yes- **Instructed pharmacy to notify patient when script is ready to /ship.**- Pharmacy stated that they have a paid claim on medication quantity 10 for 3 day supply and will notify the patient when medication is ready for .   Patient Notified: Yes    **Be Advised: PA Denied for Quantity 16 per 30 days has been Denied, Denial Rational noted below.**        PA Denied Quantity 16 per 30 days, noted below.**

## 2023-01-20 DIAGNOSIS — I26.99 ACUTE PULMONARY EMBOLISM WITHOUT ACUTE COR PULMONALE, UNSPECIFIED PULMONARY EMBOLISM TYPE (H): ICD-10-CM

## 2023-01-22 ENCOUNTER — HEALTH MAINTENANCE LETTER (OUTPATIENT)
Age: 63
End: 2023-01-22

## 2023-01-23 RX ORDER — APIXABAN 5 MG/1
TABLET, FILM COATED ORAL
Qty: 60 TABLET | Refills: 0 | Status: SHIPPED | OUTPATIENT
Start: 2023-01-23 | End: 2023-02-10

## 2023-01-24 ENCOUNTER — VIRTUAL VISIT (OUTPATIENT)
Dept: ONCOLOGY | Facility: CLINIC | Age: 63
End: 2023-01-24
Payer: COMMERCIAL

## 2023-01-24 DIAGNOSIS — C79.51 BONE METASTASES: ICD-10-CM

## 2023-01-24 DIAGNOSIS — G89.3 CANCER ASSOCIATED PAIN: ICD-10-CM

## 2023-01-24 DIAGNOSIS — C50.911 BILATERAL MALIGNANT NEOPLASM OF BREAST IN FEMALE, UNSPECIFIED ESTROGEN RECEPTOR STATUS, UNSPECIFIED SITE OF BREAST (H): ICD-10-CM

## 2023-01-24 DIAGNOSIS — C50.912 BILATERAL MALIGNANT NEOPLASM OF BREAST IN FEMALE, UNSPECIFIED ESTROGEN RECEPTOR STATUS, UNSPECIFIED SITE OF BREAST (H): ICD-10-CM

## 2023-01-24 DIAGNOSIS — G47.00 INSOMNIA, UNSPECIFIED TYPE: ICD-10-CM

## 2023-01-24 DIAGNOSIS — D64.81 ANEMIA ASSOCIATED WITH CHEMOTHERAPY: ICD-10-CM

## 2023-01-24 DIAGNOSIS — R07.81 RIB PAIN ON LEFT SIDE: ICD-10-CM

## 2023-01-24 DIAGNOSIS — T45.1X5A CHEMOTHERAPY-INDUCED NEUTROPENIA (H): ICD-10-CM

## 2023-01-24 DIAGNOSIS — R11.2 CHEMOTHERAPY INDUCED NAUSEA AND VOMITING: ICD-10-CM

## 2023-01-24 DIAGNOSIS — D70.1 CHEMOTHERAPY-INDUCED NEUTROPENIA (H): ICD-10-CM

## 2023-01-24 DIAGNOSIS — E87.6 HYPOKALEMIA: ICD-10-CM

## 2023-01-24 DIAGNOSIS — C50.919 METASTATIC BREAST CANCER: Primary | ICD-10-CM

## 2023-01-24 DIAGNOSIS — T45.1X5A CHEMOTHERAPY INDUCED NAUSEA AND VOMITING: ICD-10-CM

## 2023-01-24 DIAGNOSIS — T45.1X5A ANEMIA ASSOCIATED WITH CHEMOTHERAPY: ICD-10-CM

## 2023-01-24 PROCEDURE — 99215 OFFICE O/P EST HI 40 MIN: CPT | Mod: GT | Performed by: INTERNAL MEDICINE

## 2023-01-24 RX ORDER — MEPERIDINE HYDROCHLORIDE 25 MG/ML
25 INJECTION INTRAMUSCULAR; INTRAVENOUS; SUBCUTANEOUS EVERY 30 MIN PRN
Status: CANCELLED | OUTPATIENT
Start: 2023-01-26

## 2023-01-24 RX ORDER — HEPARIN SODIUM,PORCINE 10 UNIT/ML
5 VIAL (ML) INTRAVENOUS
Status: CANCELLED | OUTPATIENT
Start: 2023-01-26

## 2023-01-24 RX ORDER — METHYLPREDNISOLONE SODIUM SUCCINATE 125 MG/2ML
125 INJECTION, POWDER, LYOPHILIZED, FOR SOLUTION INTRAMUSCULAR; INTRAVENOUS
Status: CANCELLED
Start: 2023-01-26

## 2023-01-24 RX ORDER — DIPHENHYDRAMINE HYDROCHLORIDE 50 MG/ML
50 INJECTION INTRAMUSCULAR; INTRAVENOUS
Status: CANCELLED
Start: 2023-01-26

## 2023-01-24 RX ORDER — ALBUTEROL SULFATE 0.83 MG/ML
2.5 SOLUTION RESPIRATORY (INHALATION)
Status: CANCELLED | OUTPATIENT
Start: 2023-01-26

## 2023-01-24 RX ORDER — HEPARIN SODIUM (PORCINE) LOCK FLUSH IV SOLN 100 UNIT/ML 100 UNIT/ML
5 SOLUTION INTRAVENOUS
Status: CANCELLED | OUTPATIENT
Start: 2023-01-26

## 2023-01-24 RX ORDER — EPINEPHRINE 1 MG/ML
0.3 INJECTION, SOLUTION INTRAMUSCULAR; SUBCUTANEOUS EVERY 5 MIN PRN
Status: CANCELLED | OUTPATIENT
Start: 2023-01-26

## 2023-01-24 RX ORDER — ALBUTEROL SULFATE 90 UG/1
1-2 AEROSOL, METERED RESPIRATORY (INHALATION)
Status: CANCELLED
Start: 2023-01-26

## 2023-01-24 RX ORDER — NALOXONE HYDROCHLORIDE 0.4 MG/ML
0.2 INJECTION, SOLUTION INTRAMUSCULAR; INTRAVENOUS; SUBCUTANEOUS
Status: CANCELLED | OUTPATIENT
Start: 2023-01-26

## 2023-01-24 NOTE — PATIENT INSTRUCTIONS
Chemo on 1/25/2023 if labs fine.    See me back in 4 weeks with chemo.    See me back in 8 weeks with chemo

## 2023-01-24 NOTE — PROGRESS NOTES
Shaneka is a 62 year old who is being evaluated via a billable video visit.      Patient stated she is in the state of [place] for the visit today.    How would you like to obtain your AVS? MyChart  If the video visit is dropped, the invitation should be resent by: Text to cell phone: [phone]  Will anyone else be joining your video visit? Claudia Gomez, Virtual Visit Facilitator      Video-Visit Details    Type of service:  Video Visit   Video Start Time: 3:25 PM  Video End Time:3:30 PM  Originating Location (pt. Location): Home    Distant Location (provider location):  Off-site  Platform used for Video Visit: [facility]        ONCOLOGY FOLLOW UP NOTE: 1/24/23        I took the history from reviewing the previous notes that she was following with Dr. Amaya.  I have copied and updated from prior notes.    ONCOLOGY History:    1. January 2006:  Diagnosed with Stage IIB, T2 N1 M0 invasive lobular carcinoma of the right breast.  Final pathology showed a 4.5 x 3.8 x 2.5 cm, 01/14 lymph nodes positive.  Estrogen, progesterone receptor positive, HER-2 negative.     2.  Genetics.  BRCA1 and 2 mutations not detected. 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       THERAPY TO DATE:   1. 2006:  ECOG 2104 protocol of dose-dense Adriamycin, Cytoxan and Avastin x4 cycles followed by Taxol and Avastin x4 cycles.   2.  03/2007:  Completed 1 year Avastin.   3.  11/2006-01/2007:  Radiotherapy to the right breast of 5040 cGy.   4.  03/[id number]:  Aromasin.  Stopped after moving to the [facility].   5.  01/2016:  Right modified radical mastectomy with latissimus dorsi flap reconstruction and a left prophylactic mastectomy with latissimus dorsi flap reconstruction.     She recently had MRI of the brain as a follow-up for multiple sclerosis and there were multiple calvarial lesions noted which was suspicious for metastatic disease.  There was no evidence of new multiple sclerosis lesions.  She had a PET scan on 8/30/2019 which  showed widespread bone metastatic lesions (calvarium, spine, sacrum, pelvis, ribs most prominent at C7, T3, L1 and L4), hypermetabolic 3 cm lesion in LLL, and mediastinal/hilar LN. CEA wnl at 1.9 and C27-29 elevated to 415 (28 on 8/23/16). A CT guided biopsy of the right iliac bone was obtained on 9/4/19, pathology consistent with metastatic adenocarcinoma (breast), ER/NE negative, HER-2 negative PDL 1 < 1%. A second biopsy was take of the LLL nodule via EBUS on 9/5/19 did not show any malignancy.    C/T/L spine MRI 8/3/19 to 9/2/19 with numerous enhancing lesions of the C, T and L spine, largest around T9 and L1. No evidence of cord compression. Has a L1 compression fracture and impending L4.     Received radiation T12-L5 3000 cGy in 10 fractions from 9/6/2019 till 9/18/2019.  Neurosurgery recommended no surgical intervention but to wear Gorge brace when out of bed and HOB >30 degrees    She started palliative Xeloda 2000/1500 on 10/1/2019 but after just a few doses she noticed nausea, red eyes blurred vision, loss of appetite.  She stopped taking it after 5 doses and was seen by nurse practitioner on 10/7/2019 when she was improving.  At that point she was restarted on Xeloda at a lower dose of 1000 mg twice a day.  As she was not able to tolerate even the lower dose with extreme nausea and vomiting and feeling extremely fatigued and blurry vision, we decided on stopping Xeloda.    We decided on repeating scans and MRI brain on 10/25/2019 showed no intracranial metastatic disease.   Multiple enhancing calvarial lesions may be increased in number and are suggestive of metastatic disease. Thinner imaging on the current study may identify the smaller lesions and may be responsible for  identified more lesions.   There is a single focus of T2 hyperintense signal within the anterior right temporal lobe may represent interval demyelination. There is no evidence for active demyelination.    PET/CT on 11/1/2019 showed  favorable response to therapy and Overall FDG uptake within scattered osseous metastases is decreased since 8/30/2019, particularly within the pelvis. Increased sclerotic appearance of L1 and L4 pathologic compression deformities, likely sequela of recently completed palliative radiation therapy.    No significant FDG uptake in previously demonstrated hypermetabolic mediastinal lymph nodes. Biopsy negative on 9/5/2019.  There is also decreased FDG uptake in the left lower lobe (biopsy negative for  malignancy), now with dense consolidation containing air bronchograms suggestive of infection versus aspiration.  There is diffuse FDG uptake within the esophagus, consistent with esophagitis.    Because of intolerance to Xeloda we decided on switching to weekly paclitaxel on 11/5/2019.    C#2 Taxol 12/4/19- started with 70mg/m2 dose reduction    C#3 Taxol 12/30/2019     PET/CT on 1/24/2020 showed progression of the disease in some of the bone lesions including increase in size and lytic lesion within the vertebral body of C4 measuring 1 cm as opposed to 0.6 cm previously and a new central soft tissue nodule with SUV max 4.1 when previously it was non-hypermetabolic.  There is also some progression noted in the right proximal femur and right iliac bone.  There is decreased metabolic uptake in the right lamina of T9 with SUV max 4.7 when previously it was 8.0.  Other lesions are stable.    CA 27-29 also had increased significantly and it was 257 on 1/28/2020.    We decided on switching to eribulin on 1/28/2020.    C#2 2/18/2020  C#2 D#8 2/25/2020    C#3 D#1 3/18/2020 -delayed by 1 week as per patient's preference because she wanted to visit her family.    She had a repeat PET/CT on 3/27/2020 which showed stable size of the bone metastatic lesions although the FDG avidity was less, consistent with treatment response.    She had MRI of the thoracic spine on 4/3/2020 and it was compared to the one which was done in August  2019 and it showed increased size of several metastatic lesions most notably at T9 but also at T5-T7.  Other lesions at T10, T11 and T12 appeared improved.    CA 27-29 was also down to 222 on 3/18/2020.    Cycle #4 eribulin ( dose reduced to 1.2 mg/m2 )-4/7/2020-   Cycle #5 Eribulin ( 1.2 mg/m2 )- 4/28/2020   Cycle #6 Eribulin ( 1.2 mg/m2 )- 5/19/2020     Cycle #7 Eribulin ( 1.2 mg/m2 )- 6/9/2020    Cycle #8 Eribulin ( 1.2 mg/m2 )- 6/30/2020     She had a repeat PET scan on 7/17/2020 which showed incidental finding of new acute bilateral pulmonary embolism in the distal left main pulmonary artery extending in the left upper and lower lobes and in the segmental and branch arteries in the right lower lobe.    It also showed mildly increased FDG avidity in the lytic lesion in the T9 lamina and right pedicle with SUV max 5.3, previously 3.9.  That is also slightly increased FDG uptake to the left anterior fifth rib at the costochondral junction SUV max 3.9, previously 2.5.  There is slightly decreased FDG uptake in the right femoral neck lesion SUV max 2.2, previously 2.9.  FDG uptake in other bone lesions is fairly stable.  No new metastasis are seen.    CA 27-29 was 308 on 6/30/2020.  It was 286 on 5/19/2020.    Overall this is consistent with only slight progression versus stable disease.    She was started on Lovenox.    Cycle #9 eribulin 7/21/2020. CA 27-29 was 238    C#10 eribulin 8/18/2020. ( delayed by one week for ANC 0.9 )    C#11 Eribulin 9/8/2020    C#12 Eribulin 9/29/2020     C#13 Eribulin 10/20/2020     PET scan on 11/6/2020 shows progression of the disease with increased FDG uptake in the lytic lesions in the T9/T10 and right posterolateral elements as well as increased FDG uptake in the previously known hypermetabolic right iliac bone lesion suggesting recurrence of previously treated metastasis.  There is new subtle lesion in the right manubrium.  There is also a new fracture in the anterolateral left  fourth rib with associated uptake and this could be a pathologic fracture.  Healing fracture in the left anterior fifth rib lesion.  There is resolution of the left pulmonary emboli.  Decreased FDG uptake of the esophagus consistent with improving inflammation.    CA 27-29 on 10/20/2020 was also elevated at 489.    C#1 Trodelvy on 11/11/2020.  Cycle #2 Trodelvy 12/2/2020  Cycle number 2-day #8 Trodelvy 12/8/2020.    12/15/2020-12/16/2020.  She was admitted to the hospital with nausea vomiting and dehydration.  She had tachycardia and initial lactic acid of 5.  It decreased to 1.4 after 2 L of normal saline.  She also had hypokalemia and ANC was 1.3.  She was treated with IV fluids.  Procalcitonin was negative.  CTA of the chest was negative for pulmonary embolism or pneumonia.  No bacterial infection was documented.  Her medication which she was initially unable to tolerate at home, were restarted and she was discharged home once started to feel better.      We delayed the start of cycle number 3 x 1 week and decrease the dose of chemotherapy by 25%.  She also had neutropenia with ANC of 1.0.      She started cycle number 3-day #1 on 12/29/2020.  CA 27-29 was 392.    Cycle number 3-day #8 1/5/2021.    C#4 Trodelvy 1/20/2021- 75% dose    2/5/2021.  PET/CT overall shows a good response to treatment with improvement of previously hypermetabolic lesions in the spine and pelvis and stability of other bone meta stasis.  There is a single lytic lesion in the right iliac bone which demonstrates slight Yimi increased FDG uptake and has SUV max 4.7 while previously it was SUV max 3.4.  There was diffuse wall thickening of the esophagus with uptake in the esophagus in the fundus of the stomach and this could be seen with inflammation.    Trodelvy cycle #5 - 2/10/2021.  75% of the dose.  CA 27-29 was 337.    Trodelvy cycle #6 - 3/3/2021.  75% of the dose.  Trodelvy cycle #6, D#8 - 3/10/2021.  75% of the dose.    Trodelvy C#8,  D#8 on 4/21/2021  CA 27-29 stable at 371 on 4/14/2021    Trodelvy, cycle #9- 5/5/2021        On 2/25/2020 when she had biopsy of the right acetabulum and it was consistent with metastatic lobular carcinoma.  Again it was Triple Negative.     On 3/18/2020 when she also met with Dr. Arelis Arshad who also advised testing for androgen receptor studies on the biopsy specimen.  Tumor was diffusely androgen receptor positive.      5/26/2021-cycle #10 Trodelvy started    CA 27-29 was 545.    6/11/2021.  PET/CT shows mildly increased uptake in several bone lesions for example in the left anterior iliac crest, mid right iliac crest and left ischium.  Uptake in other bone lesions are similar to previously.    Because of minimal progression of the disease and she is tolerating chemotherapy very well, we decided on continuing the same chemotherapy.    6/16/2021-Trodelvy cycle #11    7/7/2021 -Trodelvy cycle #12    9/14/2021-Trodelvy-cycle #15, day #8.    9/8/2021-CA 27-29 was more elevated and it was 1176.    PET/CT on 9/24/2021 showed stable findings with no evidence of FDG avid metastatic disease within the body.  Left axillary lymph nodes are prominent and hypermetabolic and likely from recent vaccinations in the left deltoid muscle.  Healed bony metastasis seems stable.      Cycle #16, Trodelvy 9/28/2021.  Cycle #17, day #8 Trodelvy was on 10/26/2021.  Cycle #18, day #8 Trodelvy on 11/22/2021       She saw Dr. Mejia whom on 10/6/2021.  She was not considered eligible for Enfortumab trial.    Cycle #19, Trodelvy on 12/7/2021 12/21/2021.  PET/CT showed progression of bony metastatic disease.  There is increase hypermetabolism in both the right and left iliac bones and the sternum.  Other bone lesions are stable.    There is new scattered areas of groundglass throughout both the lungs and these findings are indeterminate but may represent an infectious etiology.  Interval resolution of left axillary FDG avid  lymphadenopathy.    She was started on Casodex 150 mg daily on 1/6/2022.        She was admitted to the hospital from 3/17/2022-3/19/2022 for vomiting and diarrhea and  severe back pain.  I reviewed the discharge summary.  CT lumbar spine showed a stable severe chronic L1 pathologic compression fracture.  Stable mild compression deformity of superior endplate of L3 and superior endplate of L4.  The upper margin of the L4 fracture extends slightly into the spinal canal without high-grade canal stenosis and this is also stable.  Nondisplaced fractures coursing through the L3 pedicles bilaterally were seen which were not clearly seen previously this could be acute and likely pathologic.  No extraspinal soft tissue abnormality.  Neurosurgery recommended conservative management.  Her pain was controlled and she was discharged home as she felt better with this.      3/31/2022-PET/CT shows progressive bone meta stasis with progressive FDG uptake in the following lesions. Left iliac crest lesion with max SUV of 7.2, previously 3.5. Right mid iliac crest lytic lesion shows maximum SUV of 7.8, previously 6.9.  T10 vertebral body mixed lytic and sclerotic lesion with an SUV of 7.1, previously not hypermetabolic. Thoracic vertebral bodies 8, 7, 5, and 4 also show hypermetabolic lesions were previously there was no hypermetabolism.  Some of the sclerotic osseous lesions are unchanged and do not show increased hypermetabolism compatible treated lesion such as a severe compression deformity at L1 as well as superior compression deformity at L4.  Medial right clavicle sclerotic lesion with SUV max of 4.8, previously not hypermetabolic. There is also right-sided sternal lesions.      4/14/2022--C#1- switch to single agent Doxil 50 mg per metered squared every 4 weeks.    5/13/2022-cycle #2- Doxil- dose reduced to 40 mg per metered square due to severe nausea/vomiting and migraines requiring IV fluids and IV  antiemetics.    6/10/2022-cycle #3-Doxil 40 mg per metered square    7/1/2022- PET/CT shows resolution of the hypermetabolic skeletal metastasis.    7/6/2022-cycle #4-Doxil 40 mg per metered square  8/5/2022- cycle #5-Doxil  9/1/2022.  Cycle #6-Doxil  9/30/2022- cycle #7-Doxil    10/18/2022.  PET/CT does not show evidence of any FDG activity    11/2/2022-cycle #8-Doxil.  11/30/2022-cycle #9-Doxil.  12/28/2022-cycle #10-Doxil    Interval history.  This is a video visit.    She feels good.  She denies any new complaints.  Tolerating chemotherapy nicely.  Denies any significant nausea vomiting diarrhea constipation.  No new pain and no new swellings.  Cancer pain is under good control with buprenorphine buccal film.  She had to take as needed morphine only twice recently.  Migraine headaches are also under better control.  Neuropathy is the same.  Energy is fine.  No infections.  She is taking calcium and vitamin D.    ECOG 1    ROS:  Rest of the comprehensive review of the system was negative.        I reviewed other history in epic as below.       PAST MEDICAL HISTORY:     1.  Breast cancer  2.  Multiple sclerosis.   3.  Depression.   4.  Migraines.   5.  Hypertension.   6.  Cholecystectomy and umbilical hernia repair.     SOCIAL HISTORY: She smoked for 5 years but quit many years ago.  Drinks alcohol socially.  She lives with her .  She is a teacher in middle school.       FAMILY HISTORY: She mentions that her mother had breast cancer at 49.  Both grandmothers had breast cancer but she is not sure of the age.  A couple of cousins have cancers.  Patient has 3 children who are healthy.    Current Outpatient Medications   Medication     acetaminophen (TYLENOL) 500 MG tablet     Buprenorphine HCl (BELBUCA) 150 MCG FILM buccal film     Buprenorphine HCl (BELBUCA) 300 MCG FILM buccal film     busPIRone (BUSPAR) 15 MG tablet     calcium citrate-vitamin D (CITRACAL) 315-250 MG-UNIT TABS     Cholecalciferol (VITAMIN  D3 PO)     ELIQUIS ANTICOAGULANT 5 MG tablet     erenumab-aooe (AIMOVIG) 140 MG/ML injection     ibuprofen (ADVIL/MOTRIN) 600 MG tablet     LORazepam (ATIVAN) 1 MG tablet     morphine (MSIR) 15 MG IR tablet     naratriptan (AMERGE) 2.5 MG tablet     ondansetron (ZOFRAN ODT) 4 MG ODT tab     promethazine (PHENERGAN) 25 MG tablet     propranolol ER (INDERAL LA) 80 MG 24 hr capsule     traZODone (DESYREL) 50 MG tablet     ubrogepant (UBRELVY) 50 MG tablet     venlafaxine (EFFEXOR XR) 75 MG 24 hr capsule     vitamin B-2 (RIBOFLAVIN) 25 MG TABS tablet     No current facility-administered medications for this visit.        Allergies   Allergen Reactions     Bactrim [Sulfamethoxazole W/Trimethoprim]      Internal bleeding     Copaxone [Glatiramer] Hives          PHYSICAL EXAMINATION:     There were no vitals taken for this visit.  Wt Readings from Last 4 Encounters:   12/28/22 62.1 kg (136 lb 12.8 oz)   11/30/22 60.3 kg (133 lb)   11/18/22 58.5 kg (129 lb)   11/02/22 62.6 kg (138 lb)         Constitutional.  Looks well and in no apparent distress.   Eyes.  Without eye redness or apparent jaundice.   Respiratory.  Non labored breathing. Speaking in full sentences.    Skin.  No concerning skin rashes on the skin visualized.   Neurological.  Is alert and oriented.  Psychiatric.  Mood and affect seem appropriate.      The rest of a comprehensive physical examination is deferred due to Public Health Emergency video visit restrictions.      Labs and Imaging.    Reviewed.    12/28/2022  CBC shows WBC 2.4.  Hemoglobin 12.4.  Platelets 193.  ANC 1.2.  CMP shows albumin 3.3.  Otherwise unremarkable.  CA 15-3 was 118    11/30/2022   CBC shows WBC 2.6.  Hemoglobin 12.7.  Platelets 154.  ANC 1.4.  Lymphocyte 0.4.   Chemistry unremarkable except albumin 3.3.  CA 15-3 was 135.    9/30/2022  CA 15-3 was 159.  CA 27-29 was 1992 on 6/10/2022  CA 27-29 was 3370 on 5/13/2022.  It was 5307 on 4/14/2022 ferritin Doxil was  started.      9/28/2021.      Iron studies, ferritin is 101, iron 51, TIBC 268 and iron saturation index 19%.    11/30/2022-x-ray of the ribs of the left side  There are a few sclerotic lesion seen in the right anterior ribs that were seen on previous PET CTs. These appear to involve the fourth and fifth ribs. There are likely more subtle lesions that were   described on the PET CT scan. Irregularity of the distal end of the left ninth rib may be from metastatic disease or fracture on the oblique view which likely is chronic.       10/18/2022-PET/CT showed no new suspicious FDG uptake within the skeleton.  Stable abnormal FDG avid lytic and sclerotic osseous lesions.  Mild diffuse FDG uptake throughout the large bowel without CT abnormality.    7/1/2022.  PET/CT shows diffuse sclerotic and lytic osseous lesions without any abnormal FDG uptake in the skeleton, consistent with treated disease.  There is evaluation of previous hypermetabolic bone lesions.  Multiple chronic rib fracture deformities and stable compression fracture deformities of T6, L1 and L4.  No suspicious FDG uptake in the chest abdomen or pelvis.    3/31/2022-PET/CT shows progressive bone metastasis with progressive FDG uptake in the following lesions. Left iliac crest lesion with max SUV of 7.2, previously 3.5. Right mid iliac crest lytic lesion shows maximum SUV of 7.8, previously 6.9.  T10 vertebral body mixed lytic and sclerotic lesion with an SUV of 7.1, previously not hypermetabolic. Thoracic vertebral bodies 8, 7, 5, and 4 also show hypermetabolic lesions were previously there was no hypermetabolism.  Some of the sclerotic osseous lesions are unchanged and do not show increased hypermetabolism compatible treated lesion such as a severe compression deformity at L1 as well as superior compression deformity at L4.  Medial right clavicle sclerotic lesion with SUV max of 4.8, previously not hypermetabolic. There is also right-sided sternal  lesions.      Biopsy from the right acetabulum shows metastatic lobular carcinoma.  It is triple negative.  Androgen receptor was diffusely positive (90%, moderate intensity) by immunohistochemistry. )  PD-L1 negative.    Foundation one showed CDH1 mutation (initially lobular cancer), CCND1 amplification ( equivocal ). FGF3, FGF4, FGF19 amplification ( Equivocal ). Most promising is CCND1 amplification with abemaciclib showing response in 4/10 patients. FGF3,FGF4 and FGF19 are targetable with pan-FGFR inhibitors.           ASSESSMENT AND PLAN:     Metastatic breast cancer negative breast cancer (androgen receptor positive ) with extensive involvement of the bones.  There is also a left lower lobe hypermetabolic lesion and mediastinal lymph nodes which are hypermetabolic.  The biopsy from the right iliac bone is consistent with metastatic lobular breast cancer but it is hormone receptor and HER-2 negative and PDL 1 is also negative.    Foundation 1 testing did not reveal any actionable mutations.    She was intolerant to Xeloda and progressed on Taxol and eribulin.      We then switched to recently FDA approved sacituzumab govitecan-hziy (Trodelvy) for TNBC, based on the results of the phase II IMMU-132-01 clinical trial.   She started this on 11/11/2020.      She obtained a second opinion from Dr. Castillo from [facility] who recommended a repeat biopsy to be done to make sure that she really has triple negative breast cancer.      She also met with Dr. Arelis Arshad on 3/18/2021.      After 10 cycles of Trodelvy, she had PET/CT on 6/11/2021 which showed mildly increased uptake in some of the bone lesions while stability in other bone lesions.        After 15 cycles, repeat PET scan showed stable findings.  CA 27-29 has been increasing and it is 1176.      As she was tolerating the chemotherapy well and her quality of life has been decent and scans were stable although she had a rising CA 27-29, we decided  on continuing the same chemotherapy because it has been a very slow progression of the disease.    Then she had clear progression of the disease in the bones with increased FDG uptake in both right and left iliac bones and the sternum.    Foundation one showed CDH1 mutation (initially lobular cancer), CCND1 amplification ( equivocal ). FGF3, FGF4, FGF19 amplification ( Equivocal ). Most promising is CCND1 amplification with abemaciclib showing response in 4/10 patients. FGF3,FGF4 and FGF19 are targetable with pan-FGFR inhibitor    As the tumor is diffusely positive for androgen receptor, we decided to start her on androgen receptor blockers.    I initially recommended enzalutamide 160 mg daily ( Enzalutamide for the Treatment of Androgen Receptor-Expressing Triple-Negative Breast Cancer----J Clin Oncol. 2018 Mar 20; 36(9): 884-890. ).    Eventually we decided to start her on Casodex 150 mg daily instead of Enzalutamide due to cost issues.  Clinical Trial Clin Cancer Res. 2013 Oct 1;19(19):5505-12.   Phase II trial of bicalutamide in patients with androgen receptor-positive, estrogen receptor-negative metastatic Breast Cancer  She started Casodex on 1/6/2022.    She had progressive disease in the bones on the PET scan on 3/31/2022.    We changed to single agent Doxil on 4/14/2022.      She developed significant nausea vomiting and headache so cycle #2 was given on 5/13/2022 with 20% dose reduction which she tolerated well.    Cycle #3 was given on 6/10/2022 with the same dose reduced Doxil.    Repeat PET/CT on 7/1/2022 shows resolution of the FDG avid bony metastasis consistent with treated disease.  No suspicious FDG uptake was seen in the chest abdomen and pelvis.  CA 27-29 was also coming down.     Repeat PET/CT in October 2022 after completing 7 cycles continued to show normal FDG uptake.  CA 15-3 was trending down     Overall treatments are going well and she has completed 10 cycles of it.  CA 15-3 is down to  118.      We plan to proceed with cycle #11 on 1/25/2023.  Most likely I will repeat PET/CT after cycle #12 as she continues to feel good.    Baseline echocardiogram on 4/7/2022 was unremarkable with EF 60 to 65%.    Bone disease.  She has metastatic disease to the bone.  Previously she got palliative radiation to T12-L5 3000 cGy in 10 fractions from 9/6/2019 till 9/18/2019.  She was evaluated by orthopedics Dr. Bipin Elliott on 11/1/2019 and conservative management was recommended.    She was on Zometa every 3 months. She last received on 9/29/2020.  Because of progression of the disease in the bones, we switched to Xgeva which she received on 11/11/2020.    She had progression of the disease in the bones so we switched to Doxil but we continued xgeva.  PET scan on 7/1/2022 does not show any suspicious FDG uptake in the bones consistent with treated disease.  Repeat PET/CT on 10/18/2022 also does not show any FDG avid lesions in the skeleton.  Pain is under good control with buprenorphine buccal film and as needed morphine.  We will continue Xgeva.  She will continue vitamin D and calcium.       Leukopenia/neutropenia.  Last ANC was 1.2.  This is chemotherapy related.  She denies any infections.  We will repeat labs tomorrow.  Can consider giving her Neulasta in the future.     Pain management.  Doing well with buprenorphine buccal film and as needed morphine which she has to take very sparingly.  Following with palliative care.        Migraines. She is on monthly Aimovig injections and takes Amerge as needed for migraines.  Doing better from this aspect.  Following with neurology.      Nausea.  Denies significant nausea.  She can take as needed antinausea medications.      Bilateral pulmonary emboli.  Remains on Eliquis which she should continue indefinitely.        We did not address the following today.      Shingles.  The rash has dried out.  She completed well with acyclovir and prednisone.  She takes  gabapentin for postherpetic neuralgia and is doing better.        Constipation. Continue senna.    Neuropathy.  This remains mild and stable with neuropathy in her hands.    This is in addition to the chronic balance issues and heat and cold sensitivity from underlying multiple sclerosis which is also stable for years.  Continue to monitor.     Subcutaneous nodule in the scalp.  This looks like an epidermoid cyst.  She thinks it is a stable.  Continue to monitor.       Insomnia.  Continue trazodone.      Wall thickening of esophagus and FDG uptake of fundus of the stomach.  It could be in the setting of inflammation from recent nausea and vomiting.  Symptoms have resolved.  Continue to monitor clinically.    Vision changes.    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  It was thought that this could be due to Taxol as patient reported to the ophthalmologist that she was on Taxol in September 2019 when her symptoms started.  But she was not on Taxol in September 2019 when she started noticing the visual changes so Taxol is not responsible for this.  The first dose of Taxol was on 11/5/2019.   She had left cataract extraction on 2/8/2021 and she has noticed significant improvement in her vision.       Increased FDG ability in the colon.  She had FDG uptake in the cecum and ascending colon but no corresponding lesion was seen on the CT scan.  She is completely asymptomatic so at this time we will continue to observe.    Discussion regarding healthcare directives.  On previous visit she told me that she will bring the health care directive for our records but she forgot so I told her to bring it on next visit.      Breast implant removal.  She tells me that she was planning to do breast implant removal because there was a recall on this.  Her surgery was scheduled for 9/24/2019.  Because of this new development of metastatic breast cancer and her recent radiation, surgery has been  canceled.  We discussed that at this time treatment for metastatic breast cancer would take precedence over the other surgery as the other surgery would require her to be off chemotherapy for several weeks and in that case the chances of cancer progression would be high and I would not recommend that.    Multiple sclerosis.  She will continue to follow with her neurologist Dr. Qiules.  Currently multiple sclerosis is under control and currently she is not taking any medications.    Return to clinic in 4 weeks and then in 8 weeks.    All of her questions were answered to her satisfaction.  She is agreeable and comfortable with the plan.    Dewayne Zepeda MD

## 2023-01-24 NOTE — LETTER
1/24/2023         RE: Shaneka Alvarez  [id number] [facility] [address]        Dear Colleague,    Thank you for referring your patient, Shaneka Alvarez, to the [facility]. Please see a copy of my visit note below.    Shaneka is a 62 year old who is being evaluated via a billable video visit.      Patient stated she is in the state of [place] for the visit today.    How would you like to obtain your AVS? MyChart  If the video visit is dropped, the invitation should be resent by: Text to cell phone: [phone]  Will anyone else be joining your video visit? Claudia Gomez, Virtual Visit Facilitator      Video-Visit Details    Type of service:  Video Visit   Video Start Time: 3:25 PM  Video End Time:3:30 PM  Originating Location (pt. Location): Home    Distant Location (provider location):  Off-site  Platform used for Video Visit: [facility]        ONCOLOGY FOLLOW UP NOTE: 1/24/23        I took the history from reviewing the previous notes that she was following with Dr. Amaya.  I have copied and updated from prior notes.    ONCOLOGY History:    1. January 2006:  Diagnosed with Stage IIB, T2 N1 M0 invasive lobular carcinoma of the right breast.  Final pathology showed a 4.5 x 3.8 x 2.5 cm, 01/14 lymph nodes positive.  Estrogen, progesterone receptor positive, HER-2 negative.     2.  Genetics.  BRCA1 and 2 mutations not detected. 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       THERAPY TO DATE:   1. 2006:  ECOG 2104 protocol of dose-dense Adriamycin, Cytoxan and Avastin x4 cycles followed by Taxol and Avastin x4 cycles.   2.  03/2007:  Completed 1 year Avastin.   3.  11/2006-01/2007:  Radiotherapy to the right breast of 5040 cGy.   4.  03/[id number]:  Aromasin.  Stopped after moving to the [facility].   5. 01/2016:  Right modified radical mastectomy with latissimus dorsi flap reconstruction and a left prophylactic mastectomy with latissimus dorsi flap reconstruction.      She recently had MRI of the brain as a follow-up for multiple sclerosis and there were multiple calvarial lesions noted which was suspicious for metastatic disease.  There was no evidence of new multiple sclerosis lesions.  She had a PET scan on 8/30/2019 which showed widespread bone metastatic lesions (calvarium, spine, sacrum, pelvis, ribs most prominent at C7, T3, L1 and L4), hypermetabolic 3 cm lesion in LLL, and mediastinal/hilar LN. CEA wnl at 1.9 and C27-29 elevated to 415 (28 on 8/23/16). A CT guided biopsy of the right iliac bone was obtained on 9/4/19, pathology consistent with metastatic adenocarcinoma (breast), ER/OK negative, HER-2 negative PDL 1 < 1%. A second biopsy was take of the LLL nodule via EBUS on 9/5/19 did not show any malignancy.    C/T/L spine MRI 8/3/19 to 9/2/19 with numerous enhancing lesions of the C, T and L spine, largest around T9 and L1. No evidence of cord compression. Has a L1 compression fracture and impending L4.     Received radiation T12-L5 3000 cGy in 10 fractions from 9/6/2019 till 9/18/2019.  Neurosurgery recommended no surgical intervention but to wear Gorge brace when out of bed and HOB >30 degrees    She started palliative Xeloda 2000/1500 on 10/1/2019 but after just a few doses she noticed nausea, red eyes blurred vision, loss of appetite.  She stopped taking it after 5 doses and was seen by nurse practitioner on 10/7/2019 when she was improving.  At that point she was restarted on Xeloda at a lower dose of 1000 mg twice a day.  As she was not able to tolerate even the lower dose with extreme nausea and vomiting and feeling extremely fatigued and blurry vision, we decided on stopping Xeloda.    We decided on repeating scans and MRI brain on 10/25/2019 showed no intracranial metastatic disease.   Multiple enhancing calvarial lesions may be increased in number and are suggestive of metastatic disease. Thinner imaging on the current study may identify the smaller  lesions and may be responsible for  identified more lesions.   There is a single focus of T2 hyperintense signal within the anterior right temporal lobe may represent interval demyelination. There is no evidence for active demyelination.    PET/CT on 11/1/2019 showed favorable response to therapy and Overall FDG uptake within scattered osseous metastases is decreased since 8/30/2019, particularly within the pelvis. Increased sclerotic appearance of L1 and L4 pathologic compression deformities, likely sequela of recently completed palliative radiation therapy.    No significant FDG uptake in previously demonstrated hypermetabolic mediastinal lymph nodes. Biopsy negative on 9/5/2019.  There is also decreased FDG uptake in the left lower lobe (biopsy negative for  malignancy), now with dense consolidation containing air bronchograms suggestive of infection versus aspiration.  There is diffuse FDG uptake within the esophagus, consistent with esophagitis.    Because of intolerance to Xeloda we decided on switching to weekly paclitaxel on 11/5/2019.    C#2 Taxol 12/4/19- started with 70mg/m2 dose reduction    C#3 Taxol 12/30/2019     PET/CT on 1/24/2020 showed progression of the disease in some of the bone lesions including increase in size and lytic lesion within the vertebral body of C4 measuring 1 cm as opposed to 0.6 cm previously and a new central soft tissue nodule with SUV max 4.1 when previously it was non-hypermetabolic.  There is also some progression noted in the right proximal femur and right iliac bone.  There is decreased metabolic uptake in the right lamina of T9 with SUV max 4.7 when previously it was 8.0.  Other lesions are stable.    CA 27-29 also had increased significantly and it was 257 on 1/28/2020.    We decided on switching to eribulin on 1/28/2020.    C#2 2/18/2020  C#2 D#8 2/25/2020    C#3 D#1 3/18/2020 -delayed by 1 week as per patient's preference because she wanted to visit her family.    She  had a repeat PET/CT on 3/27/2020 which showed stable size of the bone metastatic lesions although the FDG avidity was less, consistent with treatment response.    She had MRI of the thoracic spine on 4/3/2020 and it was compared to the one which was done in August 2019 and it showed increased size of several metastatic lesions most notably at T9 but also at T5-T7.  Other lesions at T10, T11 and T12 appeared improved.    CA 27-29 was also down to 222 on 3/18/2020.    Cycle #4 eribulin ( dose reduced to 1.2 mg/m2 )-4/7/2020-   Cycle #5 Eribulin ( 1.2 mg/m2 )- 4/28/2020   Cycle #6 Eribulin ( 1.2 mg/m2 )- 5/19/2020     Cycle #7 Eribulin ( 1.2 mg/m2 )- 6/9/2020    Cycle #8 Eribulin ( 1.2 mg/m2 )- 6/30/2020     She had a repeat PET scan on 7/17/2020 which showed incidental finding of new acute bilateral pulmonary embolism in the distal left main pulmonary artery extending in the left upper and lower lobes and in the segmental and branch arteries in the right lower lobe.    It also showed mildly increased FDG avidity in the lytic lesion in the T9 lamina and right pedicle with SUV max 5.3, previously 3.9.  That is also slightly increased FDG uptake to the left anterior fifth rib at the costochondral junction SUV max 3.9, previously 2.5.  There is slightly decreased FDG uptake in the right femoral neck lesion SUV max 2.2, previously 2.9.  FDG uptake in other bone lesions is fairly stable.  No new metastasis are seen.    CA 27-29 was 308 on 6/30/2020.  It was 286 on 5/19/2020.    Overall this is consistent with only slight progression versus stable disease.    She was started on Lovenox.    Cycle #9 eribulin 7/21/2020. CA 27-29 was 238    C#10 eribulin 8/18/2020. ( delayed by one week for ANC 0.9 )    C#11 Eribulin 9/8/2020    C#12 Eribulin 9/29/2020     C#13 Eribulin 10/20/2020     PET scan on 11/6/2020 shows progression of the disease with increased FDG uptake in the lytic lesions in the T9/T10 and right posterolateral  elements as well as increased FDG uptake in the previously known hypermetabolic right iliac bone lesion suggesting recurrence of previously treated metastasis.  There is new subtle lesion in the right manubrium.  There is also a new fracture in the anterolateral left fourth rib with associated uptake and this could be a pathologic fracture.  Healing fracture in the left anterior fifth rib lesion.  There is resolution of the left pulmonary emboli.  Decreased FDG uptake of the esophagus consistent with improving inflammation.    CA 27-29 on 10/20/2020 was also elevated at 489.    C#1 Trodelvy on 11/11/2020.  Cycle #2 Trodelvy 12/2/2020  Cycle number 2-day #8 Trodelvy 12/8/2020.    12/15/2020-12/16/2020.  She was admitted to the hospital with nausea vomiting and dehydration.  She had tachycardia and initial lactic acid of 5.  It decreased to 1.4 after 2 L of normal saline.  She also had hypokalemia and ANC was 1.3.  She was treated with IV fluids.  Procalcitonin was negative.  CTA of the chest was negative for pulmonary embolism or pneumonia.  No bacterial infection was documented.  Her medication which she was initially unable to tolerate at home, were restarted and she was discharged home once started to feel better.      We delayed the start of cycle number 3 x 1 week and decrease the dose of chemotherapy by 25%.  She also had neutropenia with ANC of 1.0.      She started cycle number 3-day #1 on 12/29/2020.  CA 27-29 was 392.    Cycle number 3-day #8 1/5/2021.    C#4 Trodelvy 1/20/2021- 75% dose    2/5/2021.  PET/CT overall shows a good response to treatment with improvement of previously hypermetabolic lesions in the spine and pelvis and stability of other bone meta stasis.  There is a single lytic lesion in the right iliac bone which demonstrates slight Yimi increased FDG uptake and has SUV max 4.7 while previously it was SUV max 3.4.  There was diffuse wall thickening of the esophagus with uptake in the esophagus  in the fundus of the stomach and this could be seen with inflammation.    Trodelvy cycle #5 - 2/10/2021.  75% of the dose.  CA 27-29 was 337.    Trodelvy cycle #6 - 3/3/2021.  75% of the dose.  Trodelvy cycle #6, D#8 - 3/10/2021.  75% of the dose.    Trodelvy C#8, D#8 on 4/21/2021  CA 27-29 stable at 371 on 4/14/2021    Trodelvy, cycle #9- 5/5/2021        On 2/25/2020 when she had biopsy of the right acetabulum and it was consistent with metastatic lobular carcinoma.  Again it was Triple Negative.     On 3/18/2020 when she also met with Dr. Arelis Arshad who also advised testing for androgen receptor studies on the biopsy specimen.  Tumor was diffusely androgen receptor positive.      5/26/2021-cycle #10 Trodelvy started    CA 27-29 was 545.    6/11/2021.  PET/CT shows mildly increased uptake in several bone lesions for example in the left anterior iliac crest, mid right iliac crest and left ischium.  Uptake in other bone lesions are similar to previously.    Because of minimal progression of the disease and she is tolerating chemotherapy very well, we decided on continuing the same chemotherapy.    6/16/2021-Trodelvy cycle #11    7/7/2021 -Trodelvy cycle #12    9/14/2021-Trodelvy-cycle #15, day #8.    9/8/2021-CA 27-29 was more elevated and it was 1176.    PET/CT on 9/24/2021 showed stable findings with no evidence of FDG avid metastatic disease within the body.  Left axillary lymph nodes are prominent and hypermetabolic and likely from recent vaccinations in the left deltoid muscle.  Healed bony metastasis seems stable.      Cycle #16, Trodelvy 9/28/2021.  Cycle #17, day #8 Trodelvy was on 10/26/2021.  Cycle #18, day #8 Trodelvy on 11/22/2021       She saw Dr. Mejia whom on 10/6/2021.  She was not considered eligible for Enfortumab trial.    Cycle #19, Trodelvy on 12/7/2021 12/21/2021.  PET/CT showed progression of bony metastatic disease.  There is increase hypermetabolism in both the right and left iliac bones  and the sternum.  Other bone lesions are stable.    There is new scattered areas of groundglass throughout both the lungs and these findings are indeterminate but may represent an infectious etiology.  Interval resolution of left axillary FDG avid lymphadenopathy.    She was started on Casodex 150 mg daily on 1/6/2022.        She was admitted to the hospital from 3/17/2022-3/19/2022 for vomiting and diarrhea and  severe back pain.  I reviewed the discharge summary.  CT lumbar spine showed a stable severe chronic L1 pathologic compression fracture.  Stable mild compression deformity of superior endplate of L3 and superior endplate of L4.  The upper margin of the L4 fracture extends slightly into the spinal canal without high-grade canal stenosis and this is also stable.  Nondisplaced fractures coursing through the L3 pedicles bilaterally were seen which were not clearly seen previously this could be acute and likely pathologic.  No extraspinal soft tissue abnormality.  Neurosurgery recommended conservative management.  Her pain was controlled and she was discharged home as she felt better with this.      3/31/2022-PET/CT shows progressive bone meta stasis with progressive FDG uptake in the following lesions. Left iliac crest lesion with max SUV of 7.2, previously 3.5. Right mid iliac crest lytic lesion shows maximum SUV of 7.8, previously 6.9.  T10 vertebral body mixed lytic and sclerotic lesion with an SUV of 7.1, previously not hypermetabolic. Thoracic vertebral bodies 8, 7, 5, and 4 also show hypermetabolic lesions were previously there was no hypermetabolism.  Some of the sclerotic osseous lesions are unchanged and do not show increased hypermetabolism compatible treated lesion such as a severe compression deformity at L1 as well as superior compression deformity at L4.  Medial right clavicle sclerotic lesion with SUV max of 4.8, previously not hypermetabolic. There is also right-sided sternal  lesions.      4/14/2022--C#1- switch to single agent Doxil 50 mg per metered squared every 4 weeks.    5/13/2022-cycle #2- Doxil- dose reduced to 40 mg per metered square due to severe nausea/vomiting and migraines requiring IV fluids and IV antiemetics.    6/10/2022-cycle #3-Doxil 40 mg per metered square    7/1/2022- PET/CT shows resolution of the hypermetabolic skeletal metastasis.    7/6/2022-cycle #4-Doxil 40 mg per metered square  8/5/2022- cycle #5-Doxil  9/1/2022.  Cycle #6-Doxil  9/30/2022- cycle #7-Doxil    10/18/2022.  PET/CT does not show evidence of any FDG activity    11/2/2022-cycle #8-Doxil.  11/30/2022-cycle #9-Doxil.  12/28/2022-cycle #10-Doxil    Interval history.  This is a video visit.    She feels good.  She denies any new complaints.  Tolerating chemotherapy nicely.  Denies any significant nausea vomiting diarrhea constipation.  No new pain and no new swellings.  Cancer pain is under good control with buprenorphine buccal film.  She had to take as needed morphine only twice recently.  Migraine headaches are also under better control.  Neuropathy is the same.  Energy is fine.  No infections.  She is taking calcium and vitamin D.    ECOG 1    ROS:  Rest of the comprehensive review of the system was negative.        I reviewed other history in epic as below.       PAST MEDICAL HISTORY:     1.  Breast cancer  2.  Multiple sclerosis.   3.  Depression.   4.  Migraines.   5.  Hypertension.   6.  Cholecystectomy and umbilical hernia repair.     SOCIAL HISTORY: She smoked for 5 years but quit many years ago.  Drinks alcohol socially.  She lives with her .  She is a teacher in middle school.       FAMILY HISTORY: She mentions that her mother had breast cancer at 49.  Both grandmothers had breast cancer but she is not sure of the age.  A couple of cousins have cancers.  Patient has 3 children who are healthy.    Current Outpatient Medications   Medication     acetaminophen (TYLENOL) 500 MG tablet      Buprenorphine HCl (BELBUCA) 150 MCG FILM buccal film     Buprenorphine HCl (BELBUCA) 300 MCG FILM buccal film     busPIRone (BUSPAR) 15 MG tablet     calcium citrate-vitamin D (CITRACAL) 315-250 MG-UNIT TABS     Cholecalciferol (VITAMIN D3 PO)     ELIQUIS ANTICOAGULANT 5 MG tablet     erenumab-aooe (AIMOVIG) 140 MG/ML injection     ibuprofen (ADVIL/MOTRIN) 600 MG tablet     LORazepam (ATIVAN) 1 MG tablet     morphine (MSIR) 15 MG IR tablet     naratriptan (AMERGE) 2.5 MG tablet     ondansetron (ZOFRAN ODT) 4 MG ODT tab     promethazine (PHENERGAN) 25 MG tablet     propranolol ER (INDERAL LA) 80 MG 24 hr capsule     traZODone (DESYREL) 50 MG tablet     ubrogepant (UBRELVY) 50 MG tablet     venlafaxine (EFFEXOR XR) 75 MG 24 hr capsule     vitamin B-2 (RIBOFLAVIN) 25 MG TABS tablet     No current facility-administered medications for this visit.        Allergies   Allergen Reactions     Bactrim [Sulfamethoxazole W/Trimethoprim]      Internal bleeding     Copaxone [Glatiramer] Hives          PHYSICAL EXAMINATION:     There were no vitals taken for this visit.  Wt Readings from Last 4 Encounters:   12/28/22 62.1 kg (136 lb 12.8 oz)   11/30/22 60.3 kg (133 lb)   11/18/22 58.5 kg (129 lb)   11/02/22 62.6 kg (138 lb)         Constitutional.  Looks well and in no apparent distress.   Eyes.  Without eye redness or apparent jaundice.   Respiratory.  Non labored breathing. Speaking in full sentences.    Skin.  No concerning skin rashes on the skin visualized.   Neurological.  Is alert and oriented.  Psychiatric.  Mood and affect seem appropriate.      The rest of a comprehensive physical examination is deferred due to Public Health Emergency video visit restrictions.      Labs and Imaging.    Reviewed.    12/28/2022  CBC shows WBC 2.4.  Hemoglobin 12.4.  Platelets 193.  ANC 1.2.  CMP shows albumin 3.3.  Otherwise unremarkable.  CA 15-3 was 118    11/30/2022   CBC shows WBC 2.6.  Hemoglobin 12.7.  Platelets 154.  ANC 1.4.   Lymphocyte 0.4.   Chemistry unremarkable except albumin 3.3.  CA 15-3 was 135.    9/30/2022  CA 15-3 was 159.  CA 27-29 was 1992 on 6/10/2022  CA 27-29 was 3370 on 5/13/2022.  It was 5307 on 4/14/2022 ferritin Doxil was started.      9/28/2021.      Iron studies, ferritin is 101, iron 51, TIBC 268 and iron saturation index 19%.    11/30/2022-x-ray of the ribs of the left side  There are a few sclerotic lesion seen in the right anterior ribs that were seen on previous PET CTs. These appear to involve the fourth and fifth ribs. There are likely more subtle lesions that were   described on the PET CT scan. Irregularity of the distal end of the left ninth rib may be from metastatic disease or fracture on the oblique view which likely is chronic.       10/18/2022-PET/CT showed no new suspicious FDG uptake within the skeleton.  Stable abnormal FDG avid lytic and sclerotic osseous lesions.  Mild diffuse FDG uptake throughout the large bowel without CT abnormality.    7/1/2022.  PET/CT shows diffuse sclerotic and lytic osseous lesions without any abnormal FDG uptake in the skeleton, consistent with treated disease.  There is evaluation of previous hypermetabolic bone lesions.  Multiple chronic rib fracture deformities and stable compression fracture deformities of T6, L1 and L4.  No suspicious FDG uptake in the chest abdomen or pelvis.    3/31/2022-PET/CT shows progressive bone metastasis with progressive FDG uptake in the following lesions. Left iliac crest lesion with max SUV of 7.2, previously 3.5. Right mid iliac crest lytic lesion shows maximum SUV of 7.8, previously 6.9.  T10 vertebral body mixed lytic and sclerotic lesion with an SUV of 7.1, previously not hypermetabolic. Thoracic vertebral bodies 8, 7, 5, and 4 also show hypermetabolic lesions were previously there was no hypermetabolism.  Some of the sclerotic osseous lesions are unchanged and do not show increased hypermetabolism compatible treated lesion such as  a severe compression deformity at L1 as well as superior compression deformity at L4.  Medial right clavicle sclerotic lesion with SUV max of 4.8, previously not hypermetabolic. There is also right-sided sternal lesions.      Biopsy from the right acetabulum shows metastatic lobular carcinoma.  It is triple negative.  Androgen receptor was diffusely positive (90%, moderate intensity) by immunohistochemistry. )  PD-L1 negative.    Foundation one showed CDH1 mutation (initially lobular cancer), CCND1 amplification ( equivocal ). FGF3, FGF4, FGF19 amplification ( Equivocal ). Most promising is CCND1 amplification with abemaciclib showing response in 4/10 patients. FGF3,FGF4 and FGF19 are targetable with pan-FGFR inhibitors.           ASSESSMENT AND PLAN:     Metastatic breast cancer negative breast cancer (androgen receptor positive ) with extensive involvement of the bones.  There is also a left lower lobe hypermetabolic lesion and mediastinal lymph nodes which are hypermetabolic.  The biopsy from the right iliac bone is consistent with metastatic lobular breast cancer but it is hormone receptor and HER-2 negative and PDL 1 is also negative.    Foundation 1 testing did not reveal any actionable mutations.    She was intolerant to Xeloda and progressed on Taxol and eribulin.      We then switched to recently FDA approved sacituzumab govitecan-hziy (Trodelvy) for TNBC, based on the results of the phase II IMMU-132-01 clinical trial.   She started this on 11/11/2020.      She obtained a second opinion from Dr. Castillo from [facility] who recommended a repeat biopsy to be done to make sure that she really has triple negative breast cancer.      She also met with Dr. Arelis Arshad on 3/18/2021.      After 10 cycles of Trodelvy, she had PET/CT on 6/11/2021 which showed mildly increased uptake in some of the bone lesions while stability in other bone lesions.        After 15 cycles, repeat PET scan showed stable  findings.  CA 27-29 has been increasing and it is 1176.      As she was tolerating the chemotherapy well and her quality of life has been decent and scans were stable although she had a rising CA 27-29, we decided on continuing the same chemotherapy because it has been a very slow progression of the disease.    Then she had clear progression of the disease in the bones with increased FDG uptake in both right and left iliac bones and the sternum.    Foundation one showed CDH1 mutation (initially lobular cancer), CCND1 amplification ( equivocal ). FGF3, FGF4, FGF19 amplification ( Equivocal ). Most promising is CCND1 amplification with abemaciclib showing response in 4/10 patients. FGF3,FGF4 and FGF19 are targetable with pan-FGFR inhibitor    As the tumor is diffusely positive for androgen receptor, we decided to start her on androgen receptor blockers.    I initially recommended enzalutamide 160 mg daily ( Enzalutamide for the Treatment of Androgen Receptor-Expressing Triple-Negative Breast Cancer----J Clin Oncol. 2018 Mar 20; 36(9): 884-890. ).    Eventually we decided to start her on Casodex 150 mg daily instead of Enzalutamide due to cost issues.  Clinical Trial Clin Cancer Res. 2013 Oct 1;19(19):5505-12.   Phase II trial of bicalutamide in patients with androgen receptor-positive, estrogen receptor-negative metastatic Breast Cancer  She started Casodex on 1/6/2022.    She had progressive disease in the bones on the PET scan on 3/31/2022.    We changed to single agent Doxil on 4/14/2022.      She developed significant nausea vomiting and headache so cycle #2 was given on 5/13/2022 with 20% dose reduction which she tolerated well.    Cycle #3 was given on 6/10/2022 with the same dose reduced Doxil.    Repeat PET/CT on 7/1/2022 shows resolution of the FDG avid bony metastasis consistent with treated disease.  No suspicious FDG uptake was seen in the chest abdomen and pelvis.  CA 27-29 was also coming down.      Repeat PET/CT in October 2022 after completing 7 cycles continued to show normal FDG uptake.  CA 15-3 was trending down     Overall treatments are going well and she has completed 10 cycles of it.  CA 15-3 is down to 118.      We plan to proceed with cycle #11 on 1/25/2023.  Most likely I will repeat PET/CT after cycle #12 as she continues to feel good.    Baseline echocardiogram on 4/7/2022 was unremarkable with EF 60 to 65%.    Bone disease.  She has metastatic disease to the bone.  Previously she got palliative radiation to T12-L5 3000 cGy in 10 fractions from 9/6/2019 till 9/18/2019.  She was evaluated by orthopedics Dr. Bipin Elliott on 11/1/2019 and conservative management was recommended.    She was on Zometa every 3 months. She last received on 9/29/2020.  Because of progression of the disease in the bones, we switched to Xgeva which she received on 11/11/2020.    She had progression of the disease in the bones so we switched to Doxil but we continued xgeva.  PET scan on 7/1/2022 does not show any suspicious FDG uptake in the bones consistent with treated disease.  Repeat PET/CT on 10/18/2022 also does not show any FDG avid lesions in the skeleton.  Pain is under good control with buprenorphine buccal film and as needed morphine.  We will continue Xgeva.  She will continue vitamin D and calcium.       Leukopenia/neutropenia.  Last ANC was 1.2.  This is chemotherapy related.  She denies any infections.  We will repeat labs tomorrow.  Can consider giving her Neulasta in the future.     Pain management.  Doing well with buprenorphine buccal film and as needed morphine which she has to take very sparingly.  Following with palliative care.        Migraines. She is on monthly Aimovig injections and takes Amerge as needed for migraines.  Doing better from this aspect.  Following with neurology.      Nausea.  Denies significant nausea.  She can take as needed antinausea medications.      Bilateral pulmonary  emboli.  Remains on Eliquis which she should continue indefinitely.        We did not address the following today.      Shingles.  The rash has dried out.  She completed well with acyclovir and prednisone.  She takes gabapentin for postherpetic neuralgia and is doing better.        Constipation. Continue senna.    Neuropathy.  This remains mild and stable with neuropathy in her hands.    This is in addition to the chronic balance issues and heat and cold sensitivity from underlying multiple sclerosis which is also stable for years.  Continue to monitor.     Subcutaneous nodule in the scalp.  This looks like an epidermoid cyst.  She thinks it is a stable.  Continue to monitor.       Insomnia.  Continue trazodone.      Wall thickening of esophagus and FDG uptake of fundus of the stomach.  It could be in the setting of inflammation from recent nausea and vomiting.  Symptoms have resolved.  Continue to monitor clinically.    Vision changes.    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  It was thought that this could be due to Taxol as patient reported to the ophthalmologist that she was on Taxol in September 2019 when her symptoms started.  But she was not on Taxol in September 2019 when she started noticing the visual changes so Taxol is not responsible for this.  The first dose of Taxol was on 11/5/2019.   She had left cataract extraction on 2/8/2021 and she has noticed significant improvement in her vision.       Increased FDG ability in the colon.  She had FDG uptake in the cecum and ascending colon but no corresponding lesion was seen on the CT scan.  She is completely asymptomatic so at this time we will continue to observe.    Discussion regarding healthcare directives.  On previous visit she told me that she will bring the health care directive for our records but she forgot so I told her to bring it on next visit.      Breast implant removal.  She tells me that she was  planning to do breast implant removal because there was a recall on this.  Her surgery was scheduled for 9/24/2019.  Because of this new development of metastatic breast cancer and her recent radiation, surgery has been canceled.  We discussed that at this time treatment for metastatic breast cancer would take precedence over the other surgery as the other surgery would require her to be off chemotherapy for several weeks and in that case the chances of cancer progression would be high and I would not recommend that.    Multiple sclerosis.  She will continue to follow with her neurologist Dr. Quiles.  Currently multiple sclerosis is under control and currently she is not taking any medications.    Return to clinic in 4 weeks and then in 8 weeks.    All of her questions were answered to her satisfaction.  She is agreeable and comfortable with the plan.    Dewayne Zepeda MD                    Again, thank you for allowing me to participate in the care of your patient.        Sincerely,        Dewayne Zepeda MD

## 2023-01-25 ENCOUNTER — LAB (OUTPATIENT)
Dept: ONCOLOGY | Facility: CLINIC | Age: 63
End: 2023-01-25
Payer: COMMERCIAL

## 2023-01-25 ENCOUNTER — INFUSION THERAPY VISIT (OUTPATIENT)
Dept: INFUSION THERAPY | Facility: CLINIC | Age: 63
End: 2023-01-25
Payer: COMMERCIAL

## 2023-01-25 VITALS
TEMPERATURE: 98.2 F | RESPIRATION RATE: 16 BRPM | DIASTOLIC BLOOD PRESSURE: 92 MMHG | OXYGEN SATURATION: 98 % | BODY MASS INDEX: 25.69 KG/M2 | SYSTOLIC BLOOD PRESSURE: 142 MMHG | HEART RATE: 68 BPM | WEIGHT: 138.7 LBS

## 2023-01-25 DIAGNOSIS — C50.919 METASTATIC BREAST CANCER: ICD-10-CM

## 2023-01-25 DIAGNOSIS — C79.51 BONE METASTASES: ICD-10-CM

## 2023-01-25 DIAGNOSIS — E87.6 HYPOKALEMIA: ICD-10-CM

## 2023-01-25 DIAGNOSIS — C50.912 BILATERAL MALIGNANT NEOPLASM OF BREAST IN FEMALE, UNSPECIFIED ESTROGEN RECEPTOR STATUS, UNSPECIFIED SITE OF BREAST (H): ICD-10-CM

## 2023-01-25 DIAGNOSIS — T45.1X5A CHEMOTHERAPY-INDUCED NEUTROPENIA (H): Primary | ICD-10-CM

## 2023-01-25 DIAGNOSIS — C50.911 BILATERAL MALIGNANT NEOPLASM OF BREAST IN FEMALE, UNSPECIFIED ESTROGEN RECEPTOR STATUS, UNSPECIFIED SITE OF BREAST (H): ICD-10-CM

## 2023-01-25 DIAGNOSIS — D70.1 CHEMOTHERAPY-INDUCED NEUTROPENIA (H): Primary | ICD-10-CM

## 2023-01-25 LAB
ALBUMIN SERPL-MCNC: 3.3 G/DL (ref 3.4–5)
ALP SERPL-CCNC: 67 U/L (ref 40–150)
ALT SERPL W P-5'-P-CCNC: 32 U/L (ref 0–50)
ANION GAP SERPL CALCULATED.3IONS-SCNC: 2 MMOL/L (ref 3–14)
AST SERPL W P-5'-P-CCNC: 32 U/L (ref 0–45)
BASOPHILS # BLD AUTO: 0 10E3/UL (ref 0–0.2)
BASOPHILS NFR BLD AUTO: 2 %
BILIRUB SERPL-MCNC: 0.3 MG/DL (ref 0.2–1.3)
BUN SERPL-MCNC: 8 MG/DL (ref 7–30)
CALCIUM SERPL-MCNC: 8.5 MG/DL (ref 8.5–10.1)
CANCER AG15-3 SERPL-ACNC: 116 U/ML
CHLORIDE BLD-SCNC: 105 MMOL/L (ref 94–109)
CO2 SERPL-SCNC: 31 MMOL/L (ref 20–32)
CREAT SERPL-MCNC: 0.7 MG/DL (ref 0.52–1.04)
EOSINOPHIL # BLD AUTO: 0.1 10E3/UL (ref 0–0.7)
EOSINOPHIL NFR BLD AUTO: 4 %
ERYTHROCYTE [DISTWIDTH] IN BLOOD BY AUTOMATED COUNT: 15.1 % (ref 10–15)
GFR SERPL CREATININE-BSD FRML MDRD: >90 ML/MIN/1.73M2
GLUCOSE BLD-MCNC: 101 MG/DL (ref 70–99)
HCT VFR BLD AUTO: 35.9 % (ref 35–47)
HGB BLD-MCNC: 11.8 G/DL (ref 11.7–15.7)
IMM GRANULOCYTES # BLD: 0 10E3/UL
IMM GRANULOCYTES NFR BLD: 2 %
LYMPHOCYTES # BLD AUTO: 0.4 10E3/UL (ref 0.8–5.3)
LYMPHOCYTES NFR BLD AUTO: 21 %
MCH RBC QN AUTO: 29.3 PG (ref 26.5–33)
MCHC RBC AUTO-ENTMCNC: 32.9 G/DL (ref 31.5–36.5)
MCV RBC AUTO: 89 FL (ref 78–100)
MONOCYTES # BLD AUTO: 0.5 10E3/UL (ref 0–1.3)
MONOCYTES NFR BLD AUTO: 27 %
NEUTROPHILS # BLD AUTO: 0.9 10E3/UL (ref 1.6–8.3)
NEUTROPHILS NFR BLD AUTO: 44 %
NRBC # BLD AUTO: 0 10E3/UL
NRBC BLD AUTO-RTO: 0 /100
PLAT MORPH BLD: NORMAL
PLATELET # BLD AUTO: 165 10E3/UL (ref 150–450)
POTASSIUM BLD-SCNC: 4.1 MMOL/L (ref 3.4–5.3)
PROT SERPL-MCNC: 6.3 G/DL (ref 6.8–8.8)
RBC # BLD AUTO: 4.03 10E6/UL (ref 3.8–5.2)
RBC MORPH BLD: NORMAL
SODIUM SERPL-SCNC: 138 MMOL/L (ref 133–144)
WBC # BLD AUTO: 2 10E3/UL (ref 4–11)

## 2023-01-25 PROCEDURE — 85025 COMPLETE CBC W/AUTO DIFF WBC: CPT | Performed by: INTERNAL MEDICINE

## 2023-01-25 PROCEDURE — 36591 DRAW BLOOD OFF VENOUS DEVICE: CPT | Performed by: INTERNAL MEDICINE

## 2023-01-25 PROCEDURE — 86300 IMMUNOASSAY TUMOR CA 15-3: CPT | Performed by: INTERNAL MEDICINE

## 2023-01-25 PROCEDURE — 80053 COMPREHEN METABOLIC PANEL: CPT | Performed by: INTERNAL MEDICINE

## 2023-01-25 PROCEDURE — 99207 PR NO CHARGE LOS: CPT

## 2023-01-25 RX ORDER — HEPARIN SODIUM (PORCINE) LOCK FLUSH IV SOLN 100 UNIT/ML 100 UNIT/ML
5 SOLUTION INTRAVENOUS
Status: DISCONTINUED | OUTPATIENT
Start: 2023-01-25 | End: 2023-01-25 | Stop reason: HOSPADM

## 2023-01-25 RX ADMIN — HEPARIN SODIUM (PORCINE) LOCK FLUSH IV SOLN 100 UNIT/ML 5 ML: 100 SOLUTION at 10:25

## 2023-01-25 NOTE — PROGRESS NOTES
Infusion Nursing Note:  Shaneka Alvarez presents today for C11 Doxil & Xgeva-BOTH NOT GIVEN.    Patient seen by provider today: No   present during visit today: Not Applicable.    Note: Patient's ANC was 0.9 today and did not meet treatment parameters. Reached out to Dr. Zepeda regarding treatment today.  Defer 1 week and will add onpro with subsequent cycles. Reviewed neutropenic precautions with patient and she verbalized understanding.  Patient to stop at scheduling on her way out & schedule next week's appointment along with imaging.     Intravenous Access:  Implanted Port.    Results reviewed, labs  DID NOT MEET treatment parameters. ANC 0.9    Post Infusion Assessment:  Patient tolerated infusion without incident.  Blood return noted pre and post infusion.  Site patent and intact, free from redness, edema or discomfort.  No evidence of extravasations.  Access discontinued per protocol.     Discharge Plan:   Discharge instructions reviewed with: Patient.  Patient and/or family verbalized understanding of discharge instructions and all questions answered.  Patient discharged in stable condition accompanied by: self.  Departure Mode: Ambulatory.      Yanci Kate RN

## 2023-01-25 NOTE — PROGRESS NOTES
"Patient's name and  were verified.  See Doc Flowsheet - IV assess for details.  IVAD accessed with 20G 3/4\" mcnair gripper plus needle  blood return positive: YES  Site without redness, tenderness or swelling: YES  flushed with 30cc NS and 5cc 100u/ml heparin  Needle: left accessed for chemotheraly  Comments: Labs drawn.  Patient tolerated procedure without incident.    Javier Roland  RN, BSN      "

## 2023-01-31 DIAGNOSIS — D70.1 CHEMOTHERAPY-INDUCED NEUTROPENIA (H): Primary | ICD-10-CM

## 2023-01-31 DIAGNOSIS — C50.912 BILATERAL MALIGNANT NEOPLASM OF BREAST IN FEMALE, UNSPECIFIED ESTROGEN RECEPTOR STATUS, UNSPECIFIED SITE OF BREAST (H): ICD-10-CM

## 2023-01-31 DIAGNOSIS — E87.6 HYPOKALEMIA: ICD-10-CM

## 2023-01-31 DIAGNOSIS — C50.911 BILATERAL MALIGNANT NEOPLASM OF BREAST IN FEMALE, UNSPECIFIED ESTROGEN RECEPTOR STATUS, UNSPECIFIED SITE OF BREAST (H): ICD-10-CM

## 2023-01-31 DIAGNOSIS — C50.919 METASTATIC BREAST CANCER: ICD-10-CM

## 2023-01-31 DIAGNOSIS — T45.1X5A CHEMOTHERAPY-INDUCED NEUTROPENIA (H): Primary | ICD-10-CM

## 2023-01-31 RX ORDER — ALBUTEROL SULFATE 0.83 MG/ML
2.5 SOLUTION RESPIRATORY (INHALATION)
Status: CANCELLED | OUTPATIENT
Start: 2023-02-03

## 2023-01-31 RX ORDER — HEPARIN SODIUM (PORCINE) LOCK FLUSH IV SOLN 100 UNIT/ML 100 UNIT/ML
5 SOLUTION INTRAVENOUS
Status: CANCELLED | OUTPATIENT
Start: 2023-02-03

## 2023-01-31 RX ORDER — DIPHENHYDRAMINE HYDROCHLORIDE 50 MG/ML
50 INJECTION INTRAMUSCULAR; INTRAVENOUS
Status: CANCELLED
Start: 2023-02-03

## 2023-01-31 RX ORDER — NALOXONE HYDROCHLORIDE 0.4 MG/ML
0.2 INJECTION, SOLUTION INTRAMUSCULAR; INTRAVENOUS; SUBCUTANEOUS
Status: CANCELLED | OUTPATIENT
Start: 2023-02-03

## 2023-01-31 RX ORDER — MEPERIDINE HYDROCHLORIDE 25 MG/ML
25 INJECTION INTRAMUSCULAR; INTRAVENOUS; SUBCUTANEOUS EVERY 30 MIN PRN
Status: CANCELLED | OUTPATIENT
Start: 2023-02-03

## 2023-01-31 RX ORDER — HEPARIN SODIUM,PORCINE 10 UNIT/ML
5 VIAL (ML) INTRAVENOUS
Status: CANCELLED | OUTPATIENT
Start: 2023-02-03

## 2023-01-31 RX ORDER — EPINEPHRINE 1 MG/ML
0.3 INJECTION, SOLUTION INTRAMUSCULAR; SUBCUTANEOUS EVERY 5 MIN PRN
Status: CANCELLED | OUTPATIENT
Start: 2023-02-03

## 2023-01-31 RX ORDER — METHYLPREDNISOLONE SODIUM SUCCINATE 125 MG/2ML
125 INJECTION, POWDER, LYOPHILIZED, FOR SOLUTION INTRAMUSCULAR; INTRAVENOUS
Status: CANCELLED
Start: 2023-02-03

## 2023-01-31 RX ORDER — ALBUTEROL SULFATE 90 UG/1
1-2 AEROSOL, METERED RESPIRATORY (INHALATION)
Status: CANCELLED
Start: 2023-02-03

## 2023-02-03 ENCOUNTER — LAB (OUTPATIENT)
Dept: ONCOLOGY | Facility: CLINIC | Age: 63
End: 2023-02-03
Payer: COMMERCIAL

## 2023-02-03 ENCOUNTER — INFUSION THERAPY VISIT (OUTPATIENT)
Dept: INFUSION THERAPY | Facility: CLINIC | Age: 63
End: 2023-02-03
Payer: COMMERCIAL

## 2023-02-03 VITALS
BODY MASS INDEX: 25.1 KG/M2 | HEART RATE: 63 BPM | WEIGHT: 135.5 LBS | SYSTOLIC BLOOD PRESSURE: 126 MMHG | RESPIRATION RATE: 16 BRPM | OXYGEN SATURATION: 97 % | DIASTOLIC BLOOD PRESSURE: 76 MMHG | TEMPERATURE: 98.1 F

## 2023-02-03 DIAGNOSIS — C50.919 METASTATIC BREAST CANCER: ICD-10-CM

## 2023-02-03 DIAGNOSIS — C79.51 BONE METASTASES: ICD-10-CM

## 2023-02-03 DIAGNOSIS — D70.1 CHEMOTHERAPY-INDUCED NEUTROPENIA (H): ICD-10-CM

## 2023-02-03 DIAGNOSIS — C50.912 BILATERAL MALIGNANT NEOPLASM OF BREAST IN FEMALE, UNSPECIFIED ESTROGEN RECEPTOR STATUS, UNSPECIFIED SITE OF BREAST (H): ICD-10-CM

## 2023-02-03 DIAGNOSIS — E87.6 HYPOKALEMIA: ICD-10-CM

## 2023-02-03 DIAGNOSIS — C50.919 METASTATIC BREAST CANCER: Primary | ICD-10-CM

## 2023-02-03 DIAGNOSIS — C50.911 BILATERAL MALIGNANT NEOPLASM OF BREAST IN FEMALE, UNSPECIFIED ESTROGEN RECEPTOR STATUS, UNSPECIFIED SITE OF BREAST (H): ICD-10-CM

## 2023-02-03 DIAGNOSIS — T45.1X5A CHEMOTHERAPY-INDUCED NEUTROPENIA (H): ICD-10-CM

## 2023-02-03 DIAGNOSIS — E87.6 HYPOKALEMIA: Primary | ICD-10-CM

## 2023-02-03 LAB
ALBUMIN SERPL-MCNC: 3.6 G/DL (ref 3.4–5)
ALP SERPL-CCNC: 60 U/L (ref 40–150)
ALT SERPL W P-5'-P-CCNC: 25 U/L (ref 0–50)
ANION GAP SERPL CALCULATED.3IONS-SCNC: 6 MMOL/L (ref 3–14)
AST SERPL W P-5'-P-CCNC: 24 U/L (ref 0–45)
BASOPHILS # BLD AUTO: 0.1 10E3/UL (ref 0–0.2)
BASOPHILS NFR BLD AUTO: 1 %
BILIRUB SERPL-MCNC: 0.5 MG/DL (ref 0.2–1.3)
BUN SERPL-MCNC: 16 MG/DL (ref 7–30)
CALCIUM SERPL-MCNC: 9.5 MG/DL (ref 8.5–10.1)
CANCER AG15-3 SERPL-ACNC: 123 U/ML
CHLORIDE BLD-SCNC: 102 MMOL/L (ref 94–109)
CO2 SERPL-SCNC: 31 MMOL/L (ref 20–32)
CREAT SERPL-MCNC: 0.82 MG/DL (ref 0.52–1.04)
EOSINOPHIL # BLD AUTO: 0.1 10E3/UL (ref 0–0.7)
EOSINOPHIL NFR BLD AUTO: 1 %
ERYTHROCYTE [DISTWIDTH] IN BLOOD BY AUTOMATED COUNT: 14.9 % (ref 10–15)
GFR SERPL CREATININE-BSD FRML MDRD: 81 ML/MIN/1.73M2
GLUCOSE BLD-MCNC: 100 MG/DL (ref 70–99)
HCT VFR BLD AUTO: 38.9 % (ref 35–47)
HGB BLD-MCNC: 13.3 G/DL (ref 11.7–15.7)
IMM GRANULOCYTES # BLD: 0 10E3/UL
IMM GRANULOCYTES NFR BLD: 1 %
LYMPHOCYTES # BLD AUTO: 0.6 10E3/UL (ref 0.8–5.3)
LYMPHOCYTES NFR BLD AUTO: 15 %
MCH RBC QN AUTO: 30 PG (ref 26.5–33)
MCHC RBC AUTO-ENTMCNC: 34.2 G/DL (ref 31.5–36.5)
MCV RBC AUTO: 88 FL (ref 78–100)
MONOCYTES # BLD AUTO: 0.9 10E3/UL (ref 0–1.3)
MONOCYTES NFR BLD AUTO: 23 %
NEUTROPHILS # BLD AUTO: 2.2 10E3/UL (ref 1.6–8.3)
NEUTROPHILS NFR BLD AUTO: 59 %
NRBC # BLD AUTO: 0 10E3/UL
NRBC BLD AUTO-RTO: 0 /100
PLAT MORPH BLD: NORMAL
PLATELET # BLD AUTO: 155 10E3/UL (ref 150–450)
POTASSIUM BLD-SCNC: 4.1 MMOL/L (ref 3.4–5.3)
PROT SERPL-MCNC: 7 G/DL (ref 6.8–8.8)
RBC # BLD AUTO: 4.43 10E6/UL (ref 3.8–5.2)
RBC MORPH BLD: NORMAL
SODIUM SERPL-SCNC: 139 MMOL/L (ref 133–144)
WBC # BLD AUTO: 3.8 10E3/UL (ref 4–11)

## 2023-02-03 PROCEDURE — 96413 CHEMO IV INFUSION 1 HR: CPT | Performed by: INTERNAL MEDICINE

## 2023-02-03 PROCEDURE — 80053 COMPREHEN METABOLIC PANEL: CPT | Performed by: INTERNAL MEDICINE

## 2023-02-03 PROCEDURE — 96372 THER/PROPH/DIAG INJ SC/IM: CPT | Mod: 59 | Performed by: INTERNAL MEDICINE

## 2023-02-03 PROCEDURE — 96367 TX/PROPH/DG ADDL SEQ IV INF: CPT | Performed by: INTERNAL MEDICINE

## 2023-02-03 PROCEDURE — 99207 PR NO CHARGE LOS: CPT

## 2023-02-03 PROCEDURE — 85025 COMPLETE CBC W/AUTO DIFF WBC: CPT | Performed by: INTERNAL MEDICINE

## 2023-02-03 PROCEDURE — 86300 IMMUNOASSAY TUMOR CA 15-3: CPT | Performed by: INTERNAL MEDICINE

## 2023-02-03 RX ORDER — HEPARIN SODIUM (PORCINE) LOCK FLUSH IV SOLN 100 UNIT/ML 100 UNIT/ML
500 SOLUTION INTRAVENOUS
Status: DISCONTINUED | OUTPATIENT
Start: 2023-02-03 | End: 2023-02-03 | Stop reason: HOSPADM

## 2023-02-03 RX ORDER — HEPARIN SODIUM (PORCINE) LOCK FLUSH IV SOLN 100 UNIT/ML 100 UNIT/ML
5 SOLUTION INTRAVENOUS
Status: DISCONTINUED | OUTPATIENT
Start: 2023-02-03 | End: 2023-02-03 | Stop reason: HOSPADM

## 2023-02-03 RX ADMIN — HEPARIN SODIUM (PORCINE) LOCK FLUSH IV SOLN 100 UNIT/ML 500 UNITS: 100 SOLUTION at 11:06

## 2023-02-03 RX ADMIN — HEPARIN SODIUM (PORCINE) LOCK FLUSH IV SOLN 100 UNIT/ML 5 ML: 100 SOLUTION at 13:37

## 2023-02-03 ASSESSMENT — PAIN SCALES - GENERAL: PAINLEVEL: NO PAIN (0)

## 2023-02-03 NOTE — PROGRESS NOTES
Infusion Nursing Note:  Shaneka GARCÍA Alvarez presents today for C11D1 Doxil and Xgeva.    Patient seen by provider today: No   present during visit today: Not Applicable.    Note: Pt doing well. Counts have recovered. Insurance still pending for onpro so pt will not receive it today-message sent to provider to see if there are any other growth factors he wants pt to receive before next cycle.    Intravenous Access:  Implanted Port.    Treatment Conditions:  Lab Results   Component Value Date    HGB 13.3 02/03/2023    WBC 3.8 (L) 02/03/2023    ANEU 1.9 10/26/2021    ANEUTAUTO 2.2 02/03/2023     02/03/2023      Lab Results   Component Value Date     02/03/2023    POTASSIUM 4.1 02/03/2023    MAG 2.0 04/21/2022    CR 0.82 02/03/2023    RAFAELA 9.5 02/03/2023    BILITOTAL 0.5 02/03/2023    ALBUMIN 3.6 02/03/2023    ALT 25 02/03/2023    AST 24 02/03/2023     Results reviewed, labs MET treatment parameters, ok to proceed with treatment.    Post Infusion Assessment:  Patient tolerated infusion without incident.  Patient tolerated injection into left arm without incident.  Blood return noted pre and post infusion.  Site patent and intact, free from redness, edema or discomfort.  No evidence of extravasations.  Access discontinued per protocol.     Discharge Plan:   AVS to patient via MYCHART.  Patient will return 3/3/23 for next appointment. Future appts have been reviewed and crosschecked with appt note and plan.   Patient discharged in stable condition accompanied by: self.  Departure Mode: Ambulatory.      Laura Duncan RN

## 2023-02-03 NOTE — PROGRESS NOTES
"Patient's name and  were verified.  See Doc Flowsheet - IV assess for details.  IVAD accessed with 20G 1\" mcnair gripper plus needle  blood return positive: YES  Site without redness, tenderness or swelling: YES  flushed with 20cc NS and 5cc 100u/ml heparin  Needle: left accessed for infusion appointment  Comments: Patient's port accessed using sterile procedure. Blood return noted. Lab tubes drawn, labeled and sent to lab. Patient tolerated lab draw well.     Devorah Parham RN, BSN  "

## 2023-02-09 DIAGNOSIS — I26.99 ACUTE PULMONARY EMBOLISM WITHOUT ACUTE COR PULMONALE, UNSPECIFIED PULMONARY EMBOLISM TYPE (H): ICD-10-CM

## 2023-02-10 ENCOUNTER — PATIENT OUTREACH (OUTPATIENT)
Dept: PALLIATIVE CARE | Facility: CLINIC | Age: 63
End: 2023-02-10
Payer: COMMERCIAL

## 2023-02-10 DIAGNOSIS — M53.3 SACROILIAC JOINT PAIN: ICD-10-CM

## 2023-02-10 DIAGNOSIS — G89.3 CANCER ASSOCIATED PAIN: ICD-10-CM

## 2023-02-10 RX ORDER — APIXABAN 5 MG/1
TABLET, FILM COATED ORAL
Qty: 60 TABLET | Refills: 3 | Status: SHIPPED | OUTPATIENT
Start: 2023-02-10 | End: 2023-01-01

## 2023-02-10 NOTE — TELEPHONE ENCOUNTER
[facility]: Palliative Care                                                                                        Fax request from Pharmacy for Belbuca     Last date written and prescribing provider:  1/4 (300mcg) and 1/11(150mcg)  Sherman     Last office visit: 1/4/2023  Next office visit:  3/8/2023     reviewed with no concerns  Last sold per :  1/13/20023 for both Belbuca doses     Attempted to reach patient to discuss her increase in dosing.  Left a voicemail for call back    2/10/2023 1718 hrs. patient called and reports that with increase in dosing she is doing much better and increase is well-tolerated.    There was a delay in her refill request this time, due to pharmacy reaching out via fax.  Encouraged patient to send a MyChart request or call us directly for future refills to prevent any delays in her getting her meds    Jennifer Jha LPN  Palliative  Care Coordination  [phone]

## 2023-03-01 ENCOUNTER — ONCOLOGY VISIT (OUTPATIENT)
Dept: ONCOLOGY | Facility: CLINIC | Age: 63
End: 2023-03-01
Payer: COMMERCIAL

## 2023-03-01 VITALS
SYSTOLIC BLOOD PRESSURE: 144 MMHG | BODY MASS INDEX: 25.97 KG/M2 | WEIGHT: 140.2 LBS | TEMPERATURE: 97.5 F | DIASTOLIC BLOOD PRESSURE: 91 MMHG | HEART RATE: 55 BPM | RESPIRATION RATE: 16 BRPM | OXYGEN SATURATION: 100 %

## 2023-03-01 DIAGNOSIS — C50.911 BILATERAL MALIGNANT NEOPLASM OF BREAST IN FEMALE, UNSPECIFIED ESTROGEN RECEPTOR STATUS, UNSPECIFIED SITE OF BREAST (H): ICD-10-CM

## 2023-03-01 DIAGNOSIS — E87.6 HYPOKALEMIA: ICD-10-CM

## 2023-03-01 DIAGNOSIS — C50.919 METASTATIC BREAST CANCER: Primary | ICD-10-CM

## 2023-03-01 DIAGNOSIS — T45.1X5A CHEMOTHERAPY-INDUCED NEUTROPENIA (H): ICD-10-CM

## 2023-03-01 DIAGNOSIS — C79.51 BONE METASTASES: ICD-10-CM

## 2023-03-01 DIAGNOSIS — D70.1 CHEMOTHERAPY-INDUCED NEUTROPENIA (H): ICD-10-CM

## 2023-03-01 DIAGNOSIS — C50.912 BILATERAL MALIGNANT NEOPLASM OF BREAST IN FEMALE, UNSPECIFIED ESTROGEN RECEPTOR STATUS, UNSPECIFIED SITE OF BREAST (H): ICD-10-CM

## 2023-03-01 PROCEDURE — 99215 OFFICE O/P EST HI 40 MIN: CPT | Performed by: INTERNAL MEDICINE

## 2023-03-01 RX ORDER — METHYLPREDNISOLONE SODIUM SUCCINATE 125 MG/2ML
125 INJECTION, POWDER, LYOPHILIZED, FOR SOLUTION INTRAMUSCULAR; INTRAVENOUS
Status: CANCELLED
Start: 2023-03-03

## 2023-03-01 RX ORDER — HEPARIN SODIUM,PORCINE 10 UNIT/ML
5 VIAL (ML) INTRAVENOUS
Status: CANCELLED | OUTPATIENT
Start: 2023-03-03

## 2023-03-01 RX ORDER — ALBUTEROL SULFATE 90 UG/1
1-2 AEROSOL, METERED RESPIRATORY (INHALATION)
Status: CANCELLED
Start: 2023-03-03

## 2023-03-01 RX ORDER — HEPARIN SODIUM (PORCINE) LOCK FLUSH IV SOLN 100 UNIT/ML 100 UNIT/ML
5 SOLUTION INTRAVENOUS
Status: CANCELLED | OUTPATIENT
Start: 2023-03-03

## 2023-03-01 RX ORDER — EPINEPHRINE 1 MG/ML
0.3 INJECTION, SOLUTION INTRAMUSCULAR; SUBCUTANEOUS EVERY 5 MIN PRN
Status: CANCELLED | OUTPATIENT
Start: 2023-03-03

## 2023-03-01 RX ORDER — NALOXONE HYDROCHLORIDE 0.4 MG/ML
0.2 INJECTION, SOLUTION INTRAMUSCULAR; INTRAVENOUS; SUBCUTANEOUS
Status: CANCELLED | OUTPATIENT
Start: 2023-03-03

## 2023-03-01 RX ORDER — DIPHENHYDRAMINE HYDROCHLORIDE 50 MG/ML
50 INJECTION INTRAMUSCULAR; INTRAVENOUS
Status: CANCELLED
Start: 2023-03-03

## 2023-03-01 RX ORDER — ALBUTEROL SULFATE 0.83 MG/ML
2.5 SOLUTION RESPIRATORY (INHALATION)
Status: CANCELLED | OUTPATIENT
Start: 2023-03-03

## 2023-03-01 RX ORDER — MEPERIDINE HYDROCHLORIDE 25 MG/ML
25 INJECTION INTRAMUSCULAR; INTRAVENOUS; SUBCUTANEOUS EVERY 30 MIN PRN
Status: CANCELLED | OUTPATIENT
Start: 2023-03-03

## 2023-03-01 NOTE — LETTER
3/1/2023         RE: Shankea Alvarez  [address] [facility] [id number]        Dear Colleague,    Thank you for referring your patient, Shaneka Alvarez, to the [facility]. Please see a copy of my visit note below.    ONCOLOGY FOLLOW UP NOTE: 3/01/23        I took the history from reviewing the previous notes that she was following with Dr. Amaya.  I have copied and updated from prior notes.    ONCOLOGY History:    1.  January 2006:  Diagnosed with Stage IIB, T2 N1 M0 invasive lobular carcinoma of the right breast.  Final pathology showed a 4.5 x 3.8 x 2.5 cm, 01/14 lymph nodes positive.  Estrogen, progesterone receptor positive, HER-2 negative.     2.  Genetics.  BRCA1 and 2 mutations not detected. 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       THERAPY TO DATE:   1. 2006:  ECOG 2104 protocol of dose-dense Adriamycin, Cytoxan and Avastin x4 cycles followed by Taxol and Avastin x4 cycles.   2.  03/2007:  Completed 1 year Avastin.   3.  11/2006-01/2007:  Radiotherapy to the right breast of 5040 cGy.   4.  03/[id number]:  Aromasin.  Stopped after moving to the [facility].   5.  01/2016:  Right modified radical mastectomy with latissimus dorsi flap reconstruction and a left prophylactic mastectomy with latissimus dorsi flap reconstruction.     She recently had MRI of the brain as a follow-up for multiple sclerosis and there were multiple calvarial lesions noted which was suspicious for metastatic disease.  There was no evidence of new multiple sclerosis lesions.  She had a PET scan on 8/30/2019 which showed widespread bone metastatic lesions (calvarium, spine, sacrum, pelvis, ribs most prominent at C7, T3, L1 and L4), hypermetabolic 3 cm lesion in LLL, and mediastinal/hilar LN. CEA wnl at 1.9 and C27-29 elevated to 415 (28 on 8/23/16). A CT guided biopsy of the right iliac bone was obtained on 9/4/19, pathology consistent with metastatic adenocarcinoma (breast), ER/[place]  negative, HER-2 negative PDL 1 < 1%. A second biopsy was take of the LLL nodule via EBUS on 9/5/19 did not show any malignancy.    C/T/L spine MRI 8/3/19 to 9/2/19 with numerous enhancing lesions of the C, T and L spine, largest around T9 and L1. No evidence of cord compression. Has a L1 compression fracture and impending L4.     Received radiation T12-L5 3000 cGy in 10 fractions from 9/6/2019 till 9/18/2019.  Neurosurgery recommended no surgical intervention but to wear [place] brace when out of bed and HOB >30 degrees    She started palliative Xeloda 2000/1500 on 10/1/2019 but after just a few doses she noticed nausea, red eyes blurred vision, loss of appetite.  She stopped taking it after 5 doses and was seen by nurse practitioner on 10/7/2019 when she was improving.  At that point she was restarted on Xeloda at a lower dose of 1000 mg twice a day.  As she was not able to tolerate even the lower dose with extreme nausea and vomiting and feeling extremely fatigued and blurry vision, we decided on stopping Xeloda.    We decided on repeating scans and MRI brain on 10/25/2019 showed no intracranial metastatic disease.   Multiple enhancing calvarial lesions may be increased in number and are suggestive of metastatic disease. Thinner imaging on the current study may identify the smaller lesions and may be responsible for  identified more lesions.   There is a single focus of T2 hyperintense signal within the anterior right temporal lobe may represent interval demyelination. There is no evidence for active demyelination.    PET/CT on 11/1/2019 showed favorable response to therapy and Overall FDG uptake within scattered osseous metastases is decreased since 8/30/2019, particularly within the pelvis. Increased sclerotic appearance of L1 and L4 pathologic compression deformities, likely sequela of recently completed palliative radiation therapy.    No significant FDG uptake in previously demonstrated hypermetabolic  mediastinal lymph nodes. Biopsy negative on 9/5/2019.  There is also decreased FDG uptake in the left lower lobe (biopsy negative for  malignancy), now with dense consolidation containing air bronchograms suggestive of infection versus aspiration.  There is diffuse FDG uptake within the esophagus, consistent with esophagitis.    Because of intolerance to Xeloda we decided on switching to weekly paclitaxel on 11/5/2019.    C#2 Taxol 12/4/19- started with 70mg/m2 dose reduction    C#3 Taxol 12/30/2019     PET/CT on 1/24/2020 showed progression of the disease in some of the bone lesions including increase in size and lytic lesion within the vertebral body of C4 measuring 1 cm as opposed to 0.6 cm previously and a new central soft tissue nodule with SUV max 4.1 when previously it was non-hypermetabolic.  There is also some progression noted in the right proximal femur and right iliac bone.  There is decreased metabolic uptake in the right lamina of T9 with SUV max 4.7 when previously it was 8.0.  Other lesions are stable.    CA 27-29 also had increased significantly and it was 257 on 1/28/2020.    We decided on switching to eribulin on 1/28/2020.    C#2 2/18/2020  C#2 D#8 2/25/2020    C#3 D#1 3/18/2020 -delayed by 1 week as per patient's preference because she wanted to visit her family.    She had a repeat PET/CT on 3/27/2020 which showed stable size of the bone metastatic lesions although the FDG avidity was less, consistent with treatment response.    She had MRI of the thoracic spine on 4/3/2020 and it was compared to the one which was done in August 2019 and it showed increased size of several metastatic lesions most notably at T9 but also at T5-T7.  Other lesions at T10, T11 and T12 appeared improved.    CA 27-29 was also down to 222 on 3/18/2020.    Cycle #4 eribulin ( dose reduced to 1.2 mg/m2 )-4/7/2020-   Cycle #5 Eribulin ( 1.2 mg/m2 )- 4/28/2020   Cycle #6 Eribulin ( 1.2 mg/m2 )- 5/19/2020     Cycle #7  Eribulin ( 1.2 mg/m2 )- 6/9/2020    Cycle #8 Eribulin ( 1.2 mg/m2 )- 6/30/2020     She had a repeat PET scan on 7/17/2020 which showed incidental finding of new acute bilateral pulmonary embolism in the distal left main pulmonary artery extending in the left upper and lower lobes and in the segmental and branch arteries in the right lower lobe.    It also showed mildly increased FDG avidity in the lytic lesion in the T9 lamina and right pedicle with SUV max 5.3, previously 3.9.  That is also slightly increased FDG uptake to the left anterior fifth rib at the costochondral junction SUV max 3.9, previously 2.5.  There is slightly decreased FDG uptake in the right femoral neck lesion SUV max 2.2, previously 2.9.  FDG uptake in other bone lesions is fairly stable.  No new metastasis are seen.    CA 27-29 was 308 on 6/30/2020.  It was 286 on 5/19/2020.    Overall this is consistent with only slight progression versus stable disease.    She was started on Lovenox.    Cycle #9 eribulin 7/21/2020. CA 27-29 was 238    C#10 eribulin 8/18/2020. ( delayed by one week for ANC 0.9 )    C#11 Eribulin 9/8/2020    C#12 Eribulin 9/29/2020     C#13 Eribulin 10/20/2020     PET scan on 11/6/2020 shows progression of the disease with increased FDG uptake in the lytic lesions in the T9/T10 and right posterolateral elements as well as increased FDG uptake in the previously known hypermetabolic right iliac bone lesion suggesting recurrence of previously treated metastasis.  There is new subtle lesion in the right manubrium.  There is also a new fracture in the anterolateral left fourth rib with associated uptake and this could be a pathologic fracture.  Healing fracture in the left anterior fifth rib lesion.  There is resolution of the left pulmonary emboli.  Decreased FDG uptake of the esophagus consistent with improving inflammation.    CA 27-29 on 10/20/2020 was also elevated at 489.    C#1 Trodelvy on 11/11/2020.  Cycle #2 Trodelvy  12/2/2020  Cycle number 2-day #8 Trodelvy 12/8/2020.    12/15/2020-12/16/2020.  She was admitted to the hospital with nausea vomiting and dehydration.  She had tachycardia and initial lactic acid of 5.  It decreased to 1.4 after 2 L of normal saline.  She also had hypokalemia and ANC was 1.3.  She was treated with IV fluids.  Procalcitonin was negative.  CTA of the chest was negative for pulmonary embolism or pneumonia.  No bacterial infection was documented.  Her medication which she was initially unable to tolerate at home, were restarted and she was discharged home once started to feel better.      We delayed the start of cycle number 3 x 1 week and decrease the dose of chemotherapy by 25%.  She also had neutropenia with ANC of 1.0.      She started cycle number 3-day #1 on 12/29/2020.  CA 27-29 was 392.    Cycle number 3-day #8 1/5/2021.    C#4 Trodelvy 1/20/2021- 75% dose    2/5/2021.  PET/CT overall shows a good response to treatment with improvement of previously hypermetabolic lesions in the spine and pelvis and stability of other bone meta stasis.  There is a single lytic lesion in the right iliac bone which demonstrates slight Yimi increased FDG uptake and has SUV max 4.7 while previously it was SUV max 3.4.  There was diffuse wall thickening of the esophagus with uptake in the esophagus in the fundus of the stomach and this could be seen with inflammation.    Trodelvy cycle #5 - 2/10/2021.  75% of the dose.  CA 27-29 was 337.    Trodelvy cycle #6 - 3/3/2021.  75% of the dose.  Trodelvy cycle #6, D#8 - 3/10/2021.  75% of the dose.    Trodelvy C#8, D#8 on 4/21/2021  CA 27-29 stable at 371 on 4/14/2021    Trodelvy, cycle #9- 5/5/2021        On 2/25/2020 when she had biopsy of the right acetabulum and it was consistent with metastatic lobular carcinoma.  Again it was Triple Negative.     On 3/18/2020 when she also met with Dr. Arelis Arshad who also advised testing for androgen receptor studies on the biopsy  specimen.  Tumor was diffusely androgen receptor positive.      5/26/2021-cycle #10 Trodelvy started    CA 27-29 was 545.    6/11/2021.  PET/CT shows mildly increased uptake in several bone lesions for example in the left anterior iliac crest, mid right iliac crest and left ischium.  Uptake in other bone lesions are similar to previously.    Because of minimal progression of the disease and she is tolerating chemotherapy very well, we decided on continuing the same chemotherapy.    6/16/2021-Trodelvy cycle #11    7/7/2021 -Trodelvy cycle #12    9/14/2021-Trodelvy-cycle #15, day #8.    9/8/2021-CA 27-29 was more elevated and it was 1176.    PET/CT on 9/24/2021 showed stable findings with no evidence of FDG avid metastatic disease within the body.  Left axillary lymph nodes are prominent and hypermetabolic and likely from recent vaccinations in the left deltoid muscle.  Healed bony metastasis seems stable.      Cycle #16, Trodelvy 9/28/2021.  Cycle #17, day #8 Trodelvy was on 10/26/2021.  Cycle #18, day #8 Trodelvy on 11/22/2021       She saw Dr. Mejia whom on 10/6/2021.  She was not considered eligible for Enfortumab trial.    Cycle #19, Trodelvy on 12/7/2021 12/21/2021.  PET/CT showed progression of bony metastatic disease.  There is increase hypermetabolism in both the right and left iliac bones and the sternum.  Other bone lesions are stable.    There is new scattered areas of groundglass throughout both the lungs and these findings are indeterminate but may represent an infectious etiology.  Interval resolution of left axillary FDG avid lymphadenopathy.    She was started on Casodex 150 mg daily on 1/6/2022.        She was admitted to the hospital from 3/17/2022-3/19/2022 for vomiting and diarrhea and  severe back pain.  I reviewed the discharge summary.  CT lumbar spine showed a stable severe chronic L1 pathologic compression fracture.  Stable mild compression deformity of superior endplate of L3 and  superior endplate of L4.  The upper margin of the L4 fracture extends slightly into the spinal canal without high-grade canal stenosis and this is also stable.  Nondisplaced fractures coursing through the L3 pedicles bilaterally were seen which were not clearly seen previously this could be acute and likely pathologic.  No extraspinal soft tissue abnormality.  Neurosurgery recommended conservative management.  Her pain was controlled and she was discharged home as she felt better with this.      3/31/2022-PET/CT shows progressive bone meta stasis with progressive FDG uptake in the following lesions. Left iliac crest lesion with max SUV of 7.2, previously 3.5. Right mid iliac crest lytic lesion shows maximum SUV of 7.8, previously 6.9.  T10 vertebral body mixed lytic and sclerotic lesion with an SUV of 7.1, previously not hypermetabolic. Thoracic vertebral bodies 8, 7, 5, and 4 also show hypermetabolic lesions were previously there was no hypermetabolism.  Some of the sclerotic osseous lesions are unchanged and do not show increased hypermetabolism compatible treated lesion such as a severe compression deformity at L1 as well as superior compression deformity at L4.  Medial right clavicle sclerotic lesion with SUV max of 4.8, previously not hypermetabolic. There is also right-sided sternal lesions.      4/14/2022--C#1- switch to single agent Doxil 50 mg per metered squared every 4 weeks.    5/13/2022-cycle #2- Doxil- dose reduced to 40 mg per metered square due to severe nausea/vomiting and migraines requiring IV fluids and IV antiemetics.    6/10/2022-cycle #3-Doxil 40 mg per metered square    7/1/2022- PET/CT shows resolution of the hypermetabolic skeletal metastasis.    7/6/2022-cycle #4-Doxil 40 mg per metered square  8/5/2022- cycle #5-Doxil  9/1/2022.  Cycle #6-Doxil  9/30/2022- cycle #7-Doxil    10/18/2022.  PET/CT does not show evidence of any FDG activity    11/2/2022-cycle #8-Doxil.  11/30/2022-cycle  #9-Doxil.  12/28/2022-cycle #10-Doxil    2/3/2023- C#11- Doxil (delayed by 1 week because neutrophil count was 0.9)-wanted to give her Onpro but at that time it was not approved by insurance.    Interval history.  She is doing well.  She has noticed minimally increased pain in the lower back but has not required any extra pain medications.  No new swellings.  Energy is fine.  No infections.  No shortness of breath.  No GI problems.  Migraine headaches are fine.  Continues to be on calcium and vitamin D.      ECOG 1    ROS:  Rest of the comprehensive review of the system was negative.        I reviewed other history in epic as below.       PAST MEDICAL HISTORY:     1.  Breast cancer  2.  Multiple sclerosis.   3.  Depression.   4.  Migraines.   5.  Hypertension.   6.  Cholecystectomy and umbilical hernia repair.     SOCIAL HISTORY: She smoked for 5 years but quit many years ago.  Drinks alcohol socially.  She lives with her .  She is a teacher in middle school.       FAMILY HISTORY: She mentions that her mother had breast cancer at 49.  Both grandmothers had breast cancer but she is not sure of the age.  A couple of cousins have cancers.  Patient has 3 children who are healthy.    Current Outpatient Medications   Medication     acetaminophen (TYLENOL) 500 MG tablet     Buprenorphine HCl (BELBUCA) 150 MCG FILM buccal film     Buprenorphine HCl (BELBUCA) 300 MCG FILM buccal film     busPIRone (BUSPAR) 15 MG tablet     calcium citrate-vitamin D (CITRACAL) 315-250 MG-UNIT TABS     Cholecalciferol (VITAMIN D3 PO)     ELIQUIS ANTICOAGULANT 5 MG tablet     erenumab-aooe (AIMOVIG) 140 MG/ML injection     ibuprofen (ADVIL/MOTRIN) 600 MG tablet     LORazepam (ATIVAN) 1 MG tablet     morphine (MSIR) 15 MG IR tablet     naratriptan (AMERGE) 2.5 MG tablet     ondansetron (ZOFRAN ODT) 4 MG ODT tab     promethazine (PHENERGAN) 25 MG tablet     propranolol ER (INDERAL LA) 80 MG 24 hr capsule     traZODone (DESYREL) 50 MG  tablet     ubrogepant (UBRELVY) 50 MG tablet     venlafaxine (EFFEXOR XR) 75 MG 24 hr capsule     vitamin B-2 (RIBOFLAVIN) 25 MG TABS tablet     No current facility-administered medications for this visit.        Allergies   Allergen Reactions     Bactrim [Sulfamethoxazole W/Trimethoprim]      Internal bleeding     Copaxone [Glatiramer] Hives          PHYSICAL EXAMINATION:     BP (!) 144/91 (BP Location: Left arm, Patient Position: Sitting, Cuff Size: Adult Regular)   Pulse 55   Temp 97.5  F (36.4  C) (Oral)   Resp 16   Wt 63.6 kg (140 lb 3.2 oz)   SpO2 100%   BMI 25.97 kg/m    Wt Readings from Last 4 Encounters:   03/01/23 63.6 kg (140 lb 3.2 oz)   02/03/23 61.5 kg (135 lb 8 oz)   01/25/23 62.9 kg (138 lb 11.2 oz)   12/28/22 62.1 kg (136 lb 12.8 oz)         CONSTITUTIONAL: no acute distress  EYES: PERRLA, no palor or icterus.   ENT/MOUTH: no mouth lesions. Ears normal  CVS: s1s2 no m r g .   RESPIRATORY: clear to auscultation b/l  GI: soft non tender no hepatosplenomegaly  NEURO: AAOX3  Grossly non focal neuro exam  INTEGUMENT: no obvious rashes  LYMPHATIC: no palpable cervical, supraclavicular, axillary or inguinal LAD  MUSCULOSKELETAL: Unremarkable. No bony tenderness.   EXTREMITIES: no edema  PSYCH: Mentation, mood and affect are normal. Decision making capacity is intact        Labs and Imaging.    Reviewed.    2/3/2023  CBC shows WBC 3.8.  ANC 2.2.  Hemoglobin 13.3.  Platelets 155.    CMP unremarkable.      CA 15-3 was 123.      11/30/2022     CA 15-3 was 135.    9/30/2022  CA 15-3 was 159.  CA 27-29 was 1992 on 6/10/2022  CA 27-29 was 3370 on 5/13/2022.  It was 5307 on 4/14/2022 ferritin Doxil was started.      9/28/2021.      Iron studies, ferritin is 101, iron 51, TIBC 268 and iron saturation index 19%.    11/30/2022-x-ray of the ribs of the left side  There are a few sclerotic lesion seen in the right anterior ribs that were seen on previous PET CTs. These appear to involve the fourth and fifth ribs.  There are likely more subtle lesions that were   described on the PET CT scan. Irregularity of the distal end of the left ninth rib may be from metastatic disease or fracture on the oblique view which likely is chronic.       10/18/2022-PET/CT showed no new suspicious FDG uptake within the skeleton.  Stable abnormal FDG avid lytic and sclerotic osseous lesions.  Mild diffuse FDG uptake throughout the large bowel without CT abnormality.    7/1/2022.  PET/CT shows diffuse sclerotic and lytic osseous lesions without any abnormal FDG uptake in the skeleton, consistent with treated disease.  There is evaluation of previous hypermetabolic bone lesions.  Multiple chronic rib fracture deformities and stable compression fracture deformities of T6, L1 and L4.  No suspicious FDG uptake in the chest abdomen or pelvis.    3/31/2022-PET/CT shows progressive bone metastasis with progressive FDG uptake in the following lesions. Left iliac crest lesion with max SUV of 7.2, previously 3.5. Right mid iliac crest lytic lesion shows maximum SUV of 7.8, previously 6.9.  T10 vertebral body mixed lytic and sclerotic lesion with an SUV of 7.1, previously not hypermetabolic. Thoracic vertebral bodies 8, 7, 5, and 4 also show hypermetabolic lesions were previously there was no hypermetabolism.  Some of the sclerotic osseous lesions are unchanged and do not show increased hypermetabolism compatible treated lesion such as a severe compression deformity at L1 as well as superior compression deformity at L4.  Medial right clavicle sclerotic lesion with SUV max of 4.8, previously not hypermetabolic. There is also right-sided sternal lesions.      Biopsy from the right acetabulum shows metastatic lobular carcinoma.  It is triple negative.  Androgen receptor was diffusely positive (90%, moderate intensity) by immunohistochemistry. )  PD-L1 negative.    Foundation one showed CDH1 mutation (initially lobular cancer), CCND1 amplification ( equivocal ).  FGF3, FGF4, FGF19 amplification ( Equivocal ). Most promising is CCND1 amplification with abemaciclib showing response in 4/10 patients. FGF3,FGF4 and FGF19 are targetable with pan-FGFR inhibitors.           ASSESSMENT AND PLAN:     Metastatic breast cancer negative breast cancer (androgen receptor positive ) with extensive involvement of the bones.  There is also a left lower lobe hypermetabolic lesion and mediastinal lymph nodes which are hypermetabolic.  The biopsy from the right iliac bone is consistent with metastatic lobular breast cancer but it is hormone receptor and HER-2 negative and PDL 1 is also negative.    Foundation 1 testing did not reveal any actionable mutations.    She was intolerant to Xeloda and progressed on Taxol and eribulin.      We then switched to recently FDA approved sacituzumab govitecan-hziy (Trodelvy) for TNBC, based on the results of the phase II IMMU-132-01 clinical trial.   She started this on 11/11/2020.      She obtained a second opinion from Dr. Castillo from [facility] who recommended a repeat biopsy to be done to make sure that she really has triple negative breast cancer.      She also met with Dr. Arelis Arshad on 3/18/2021.      After 10 cycles of Trodelvy, she had PET/CT on 6/11/2021 which showed mildly increased uptake in some of the bone lesions while stability in other bone lesions.        After 15 cycles, repeat PET scan showed stable findings.  CA 27-29 has been increasing and it is 1176.      As she was tolerating the chemotherapy well and her quality of life has been decent and scans were stable although she had a rising CA 27-29, we decided on continuing the same chemotherapy because it has been a very slow progression of the disease.    Then she had clear progression of the disease in the bones with increased FDG uptake in both right and left iliac bones and the sternum.    Foundation one showed CDH1 mutation (initially lobular cancer), CCND1  amplification ( equivocal ). FGF3, FGF4, FGF19 amplification ( Equivocal ). Most promising is CCND1 amplification with abemaciclib showing response in 4/10 patients. FGF3,FGF4 and FGF19 are targetable with pan-FGFR inhibitor    As the tumor is diffusely positive for androgen receptor, we decided to start her on androgen receptor blockers.    I initially recommended enzalutamide 160 mg daily ( Enzalutamide for the Treatment of Androgen Receptor-Expressing Triple-Negative Breast Cancer----J Clin Oncol. 2018 Mar 20; 36(9): 884-890. ).    Eventually we decided to start her on Casodex 150 mg daily instead of Enzalutamide due to cost issues.  Clinical Trial Clin Cancer Res. 2013 Oct 1;19(19):5505-12.   Phase II trial of bicalutamide in patients with androgen receptor-positive, estrogen receptor-negative metastatic Breast Cancer  She started Casodex on 1/6/2022.    She had progressive disease in the bones on the PET scan on 3/31/2022.    We changed to single agent Doxil on 4/14/2022.      She developed significant nausea vomiting and headache so cycle #2 was given on 5/13/2022 with 20% dose reduction which she tolerated well.    Cycle #3 was given on 6/10/2022 with the same dose reduced Doxil.    Repeat PET/CT on 7/1/2022 shows resolution of the FDG avid bony metastasis consistent with treated disease.  No suspicious FDG uptake was seen in the chest abdomen and pelvis.  CA 27-29 was also coming down.     Repeat PET/CT in October 2022 after completing 7 cycles continued to show normal FDG uptake.  CA 15-3 was trending down     Cycle #11 was delayed by 1 week because of chemotherapy-induced neutropenia.  It was received on 2/3/2023.    Overall tolerating chemotherapy well.  She will proceed with cycle #12 on 3/3/2023.  Most likely I will repeat PET/CT after cycle #12       Chemotherapy induced neutropenia.  We will give her Onpro.          Baseline echocardiogram on 4/7/2022 was unremarkable with EF 60 to 65%.    Bone  disease.  She has metastatic disease to the bone.  Previously she got palliative radiation to T12-L5 3000 cGy in 10 fractions from 9/6/2019 till 9/18/2019.  She was evaluated by orthopedics Dr. Bipin Elliott on 11/1/2019 and conservative management was recommended.    She was on Zometa every 3 months. She last received on 9/29/2020.  Because of progression of the disease in the bones, we switched to Xgeva which she received on 11/11/2020.    She had progression of the disease in the bones so we switched to Doxil but we continued xgeva.  PET scan on 7/1/2022 does not show any suspicious FDG uptake in the bones consistent with treated disease.  Repeat PET/CT on 10/18/2022 also does not show any FDG avid lesions in the skeleton.  Overall pain is under good control with buprenorphine buccal film and she has not required as needed morphine although recently she has noticed slightly more pain in the lower back.  We will check PET scan as mentioned above.  Continue Xgeva calcium and vitamin D.        Pain management.  Doing well with buprenorphine buccal film and she has as needed morphine which she has not used recently.  Follow-up with palliative care.       Migraines.  Doing better with monthly Aimovig injections and takes Amerge as needed for migraines.  Following with neurology.        Bilateral pulmonary emboli.  Remains on Eliquis which she should continue indefinitely.        We did not address the following today.      Shingles.  The rash has dried out.  She completed well with acyclovir and prednisone.  She takes gabapentin for postherpetic neuralgia and is doing better.        Constipation. Continue senna.    Neuropathy.  This remains mild and stable with neuropathy in her hands.    This is in addition to the chronic balance issues and heat and cold sensitivity from underlying multiple sclerosis which is also stable for years.  Continue to monitor.     Subcutaneous nodule in the scalp.  This looks like an  epidermoid cyst.  She thinks it is a stable.  Continue to monitor.       Insomnia.  Continue trazodone.      Wall thickening of esophagus and FDG uptake of fundus of the stomach.  It could be in the setting of inflammation from recent nausea and vomiting.  Symptoms have resolved.  Continue to monitor clinically.    Vision changes.    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  It was thought that this could be due to Taxol as patient reported to the ophthalmologist that she was on Taxol in September 2019 when her symptoms started.  But she was not on Taxol in September 2019 when she started noticing the visual changes so Taxol is not responsible for this.  The first dose of Taxol was on 11/5/2019.   She had left cataract extraction on 2/8/2021 and she has noticed significant improvement in her vision.       Increased FDG ability in the colon.  She had FDG uptake in the cecum and ascending colon but no corresponding lesion was seen on the CT scan.  She is completely asymptomatic so at this time we will continue to observe.    Discussion regarding healthcare directives.  On previous visit she told me that she will bring the health care directive for our records but she forgot so I told her to bring it on next visit.      Breast implant removal.  She tells me that she was planning to do breast implant removal because there was a recall on this.  Her surgery was scheduled for 9/24/2019.  Because of this new development of metastatic breast cancer and her recent radiation, surgery has been canceled.  We discussed that at this time treatment for metastatic breast cancer would take precedence over the other surgery as the other surgery would require her to be off chemotherapy for several weeks and in that case the chances of cancer progression would be high and I would not recommend that.    Multiple sclerosis.  She will continue to follow with her neurologist Dr. Quiles.  Currently  multiple sclerosis is under control and currently she is not taking any medications.    Return to clinic in 4 weeks with PET scan before that    All of her questions were answered to her satisfaction.  She is agreeable and comfortable with the plan.    Dewayne Zepeda MD              Again, thank you for allowing me to participate in the care of your patient.        Sincerely,        Dewayne Zepeda MD

## 2023-03-01 NOTE — PROGRESS NOTES
ONCOLOGY FOLLOW UP NOTE: 3/01/23        I took the history from reviewing the previous notes that she was following with Dr. Amaya.  I have copied and updated from prior notes.    ONCOLOGY History:    1.  January 2006:  Diagnosed with Stage IIB, T2 N1 M0 invasive lobular carcinoma of the right breast.  Final pathology showed a 4.5 x 3.8 x 2.5 cm, 01/14 lymph nodes positive.  Estrogen, progesterone receptor positive, HER-2 negative.     2.  Genetics.  BRCA1 and 2 mutations not detected. 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       THERAPY TO DATE:   1.  2006:  ECOG 2104 protocol of dose-dense Adriamycin, Cytoxan and Avastin x4 cycles followed by Taxol and Avastin x4 cycles.   2.  03/2007:  Completed 1 year Avastin.   3.  11/2006-01/2007:  Radiotherapy to the right breast of 5040 cGy.   4.  03/[id number]:  Aromasin.  Stopped after moving to the [facility].   5.  01/2016:  Right modified radical mastectomy with latissimus dorsi flap reconstruction and a left prophylactic mastectomy with latissimus dorsi flap reconstruction.     She recently had MRI of the brain as a follow-up for multiple sclerosis and there were multiple calvarial lesions noted which was suspicious for metastatic disease.  There was no evidence of new multiple sclerosis lesions.  She had a PET scan on 8/30/2019 which showed widespread bone metastatic lesions (calvarium, spine, sacrum, pelvis, ribs most prominent at C7, T3, L1 and L4), hypermetabolic 3 cm lesion in LLL, and mediastinal/hilar LN. CEA wnl at 1.9 and C27-29 elevated to 415 (28 on 8/23/16). A CT guided biopsy of the right iliac bone was obtained on 9/4/19, pathology consistent with metastatic adenocarcinoma (breast), ER/CO negative, HER-2 negative PDL 1 < 1%. A second biopsy was take of the LLL nodule via EBUS on 9/5/19 did not show any malignancy.    C/T/L spine MRI 8/3/19 to 9/2/19 with numerous enhancing lesions of the C, T and L spine, largest around T9 and L1. No evidence of  cord compression. Has a L1 compression fracture and impending L4.     Received radiation T12-L5 3000 cGy in 10 fractions from 9/6/2019 till 9/18/2019.  Neurosurgery recommended no surgical intervention but to wear Gorge brace when out of bed and HOB >30 degrees    She started palliative Xeloda 2000/1500 on 10/1/2019 but after just a few doses she noticed nausea, red eyes blurred vision, loss of appetite.  She stopped taking it after 5 doses and was seen by nurse practitioner on 10/7/2019 when she was improving.  At that point she was restarted on Xeloda at a lower dose of 1000 mg twice a day.  As she was not able to tolerate even the lower dose with extreme nausea and vomiting and feeling extremely fatigued and blurry vision, we decided on stopping Xeloda.    We decided on repeating scans and MRI brain on 10/25/2019 showed no intracranial metastatic disease.   Multiple enhancing calvarial lesions may be increased in number and are suggestive of metastatic disease. Thinner imaging on the current study may identify the smaller lesions and may be responsible for  identified more lesions.   There is a single focus of T2 hyperintense signal within the anterior right temporal lobe may represent interval demyelination. There is no evidence for active demyelination.    PET/CT on 11/1/2019 showed favorable response to therapy and Overall FDG uptake within scattered osseous metastases is decreased since 8/30/2019, particularly within the pelvis. Increased sclerotic appearance of L1 and L4 pathologic compression deformities, likely sequela of recently completed palliative radiation therapy.    No significant FDG uptake in previously demonstrated hypermetabolic mediastinal lymph nodes. Biopsy negative on 9/5/2019.  There is also decreased FDG uptake in the left lower lobe (biopsy negative for  malignancy), now with dense consolidation containing air bronchograms suggestive of infection versus aspiration.  There is diffuse FDG  uptake within the esophagus, consistent with esophagitis.    Because of intolerance to Xeloda we decided on switching to weekly paclitaxel on 11/5/2019.    C#2 Taxol 12/4/19- started with 70mg/m2 dose reduction    C#3 Taxol 12/30/2019     PET/CT on 1/24/2020 showed progression of the disease in some of the bone lesions including increase in size and lytic lesion within the vertebral body of C4 measuring 1 cm as opposed to 0.6 cm previously and a new central soft tissue nodule with SUV max 4.1 when previously it was non-hypermetabolic.  There is also some progression noted in the right proximal femur and right iliac bone.  There is decreased metabolic uptake in the right lamina of T9 with SUV max 4.7 when previously it was 8.0.  Other lesions are stable.    CA 27-29 also had increased significantly and it was 257 on 1/28/2020.    We decided on switching to eribulin on 1/28/2020.    C#2 2/18/2020  C#2 D#8 2/25/2020    C#3 D#1 3/18/2020 -delayed by 1 week as per patient's preference because she wanted to visit her family.    She had a repeat PET/CT on 3/27/2020 which showed stable size of the bone metastatic lesions although the FDG avidity was less, consistent with treatment response.    She had MRI of the thoracic spine on 4/3/2020 and it was compared to the one which was done in August 2019 and it showed increased size of several metastatic lesions most notably at T9 but also at T5-T7.  Other lesions at T10, T11 and T12 appeared improved.    CA 27-29 was also down to 222 on 3/18/2020.    Cycle #4 eribulin ( dose reduced to 1.2 mg/m2 )-4/7/2020-   Cycle #5 Eribulin ( 1.2 mg/m2 )- 4/28/2020   Cycle #6 Eribulin ( 1.2 mg/m2 )- 5/19/2020     Cycle #7 Eribulin ( 1.2 mg/m2 )- 6/9/2020    Cycle #8 Eribulin ( 1.2 mg/m2 )- 6/30/2020     She had a repeat PET scan on 7/17/2020 which showed incidental finding of new acute bilateral pulmonary embolism in the distal left main pulmonary artery extending in the left upper and lower  lobes and in the segmental and branch arteries in the right lower lobe.    It also showed mildly increased FDG avidity in the lytic lesion in the T9 lamina and right pedicle with SUV max 5.3, previously 3.9.  That is also slightly increased FDG uptake to the left anterior fifth rib at the costochondral junction SUV max 3.9, previously 2.5.  There is slightly decreased FDG uptake in the right femoral neck lesion SUV max 2.2, previously 2.9.  FDG uptake in other bone lesions is fairly stable.  No new metastasis are seen.    CA 27-29 was 308 on 6/30/2020.  It was 286 on 5/19/2020.    Overall this is consistent with only slight progression versus stable disease.    She was started on Lovenox.    Cycle #9 eribulin 7/21/2020. CA 27-29 was 238    C#10 eribulin 8/18/2020. ( delayed by one week for ANC 0.9 )    C#11 Eribulin 9/8/2020    C#12 Eribulin 9/29/2020     C#13 Eribulin 10/20/2020     PET scan on 11/6/2020 shows progression of the disease with increased FDG uptake in the lytic lesions in the T9/T10 and right posterolateral elements as well as increased FDG uptake in the previously known hypermetabolic right iliac bone lesion suggesting recurrence of previously treated metastasis.  There is new subtle lesion in the right manubrium.  There is also a new fracture in the anterolateral left fourth rib with associated uptake and this could be a pathologic fracture.  Healing fracture in the left anterior fifth rib lesion.  There is resolution of the left pulmonary emboli.  Decreased FDG uptake of the esophagus consistent with improving inflammation.    CA 27-29 on 10/20/2020 was also elevated at 489.    C#1 Trodelvy on 11/11/2020.  Cycle #2 Trodelvy 12/2/2020  Cycle number 2-day #8 Trodelvy 12/8/2020.    12/15/2020-12/16/2020.  She was admitted to the hospital with nausea vomiting and dehydration.  She had tachycardia and initial lactic acid of 5.  It decreased to 1.4 after 2 L of normal saline.  She also had hypokalemia  and ANC was 1.3.  She was treated with IV fluids.  Procalcitonin was negative.  CTA of the chest was negative for pulmonary embolism or pneumonia.  No bacterial infection was documented.  Her medication which she was initially unable to tolerate at home, were restarted and she was discharged home once started to feel better.      We delayed the start of cycle number 3 x 1 week and decrease the dose of chemotherapy by 25%.  She also had neutropenia with ANC of 1.0.      She started cycle number 3-day #1 on 12/29/2020.  CA 27-29 was 392.    Cycle number 3-day #8 1/5/2021.    C#4 Trodelvy 1/20/2021- 75% dose    2/5/2021.  PET/CT overall shows a good response to treatment with improvement of previously hypermetabolic lesions in the spine and pelvis and stability of other bone meta stasis.  There is a single lytic lesion in the right iliac bone which demonstrates slight Yimi increased FDG uptake and has SUV max 4.7 while previously it was SUV max 3.4.  There was diffuse wall thickening of the esophagus with uptake in the esophagus in the fundus of the stomach and this could be seen with inflammation.    Trodelvy cycle #5 - 2/10/2021.  75% of the dose.  CA 27-29 was 337.    Trodelvy cycle #6 - 3/3/2021.  75% of the dose.  Trodelvy cycle #6, D#8 - 3/10/2021.  75% of the dose.    Trodelvy C#8, D#8 on 4/21/2021  CA 27-29 stable at 371 on 4/14/2021    Trodelvy, cycle #9- 5/5/2021        On 2/25/2020 when she had biopsy of the right acetabulum and it was consistent with metastatic lobular carcinoma.  Again it was Triple Negative.     On 3/18/2020 when she also met with Dr. Arelis Arshad who also advised testing for androgen receptor studies on the biopsy specimen.  Tumor was diffusely androgen receptor positive.      5/26/2021-cycle #10 Trodelvy started    CA 27-29 was 545.    6/11/2021.  PET/CT shows mildly increased uptake in several bone lesions for example in the left anterior iliac crest, mid right iliac crest and left  ischium.  Uptake in other bone lesions are similar to previously.    Because of minimal progression of the disease and she is tolerating chemotherapy very well, we decided on continuing the same chemotherapy.    6/16/2021-Trodelvy cycle #11    7/7/2021 -Trodelvy cycle #12    9/14/2021-Trodelvy-cycle #15, day #8.    9/8/2021-CA 27-29 was more elevated and it was 1176.    PET/CT on 9/24/2021 showed stable findings with no evidence of FDG avid metastatic disease within the body.  Left axillary lymph nodes are prominent and hypermetabolic and likely from recent vaccinations in the left deltoid muscle.  Healed bony metastasis seems stable.      Cycle #16, Trodelvy 9/28/2021.  Cycle #17, day #8 Trodelvy was on 10/26/2021.  Cycle #18, day #8 Trodelvy on 11/22/2021       She saw Dr. Mejia whom on 10/6/2021.  She was not considered eligible for Enfortumab trial.    Cycle #19, Trodelvy on 12/7/2021 12/21/2021.  PET/CT showed progression of bony metastatic disease.  There is increase hypermetabolism in both the right and left iliac bones and the sternum.  Other bone lesions are stable.    There is new scattered areas of groundglass throughout both the lungs and these findings are indeterminate but may represent an infectious etiology.  Interval resolution of left axillary FDG avid lymphadenopathy.    She was started on Casodex 150 mg daily on 1/6/2022.        She was admitted to the hospital from 3/17/2022-3/19/2022 for vomiting and diarrhea and  severe back pain.  I reviewed the discharge summary.  CT lumbar spine showed a stable severe chronic L1 pathologic compression fracture.  Stable mild compression deformity of superior endplate of L3 and superior endplate of L4.  The upper margin of the L4 fracture extends slightly into the spinal canal without high-grade canal stenosis and this is also stable.  Nondisplaced fractures coursing through the L3 pedicles bilaterally were seen which were not clearly seen previously  this could be acute and likely pathologic.  No extraspinal soft tissue abnormality.  Neurosurgery recommended conservative management.  Her pain was controlled and she was discharged home as she felt better with this.      3/31/2022-PET/CT shows progressive bone meta stasis with progressive FDG uptake in the following lesions. Left iliac crest lesion with max SUV of 7.2, previously 3.5. Right mid iliac crest lytic lesion shows maximum SUV of 7.8, previously 6.9.  T10 vertebral body mixed lytic and sclerotic lesion with an SUV of 7.1, previously not hypermetabolic. Thoracic vertebral bodies 8, 7, 5, and 4 also show hypermetabolic lesions were previously there was no hypermetabolism.  Some of the sclerotic osseous lesions are unchanged and do not show increased hypermetabolism compatible treated lesion such as a severe compression deformity at L1 as well as superior compression deformity at L4.  Medial right clavicle sclerotic lesion with SUV max of 4.8, previously not hypermetabolic. There is also right-sided sternal lesions.      4/14/2022--C#1- switch to single agent Doxil 50 mg per metered squared every 4 weeks.    5/13/2022-cycle #2- Doxil- dose reduced to 40 mg per metered square due to severe nausea/vomiting and migraines requiring IV fluids and IV antiemetics.    6/10/2022-cycle #3-Doxil 40 mg per metered square    7/1/2022- PET/CT shows resolution of the hypermetabolic skeletal metastasis.    7/6/2022-cycle #4-Doxil 40 mg per metered square  8/5/2022- cycle #5-Doxil  9/1/2022.  Cycle #6-Doxil  9/30/2022- cycle #7-Doxil    10/18/2022.  PET/CT does not show evidence of any FDG activity    11/2/2022-cycle #8-Doxil.  11/30/2022-cycle #9-Doxil.  12/28/2022-cycle #10-Doxil    2/3/2023- C#11- Doxil (delayed by 1 week because neutrophil count was 0.9)-wanted to give her Onpro but at that time it was not approved by insurance.    Interval history.  She is doing well.  She has noticed minimally increased pain in the  lower back but has not required any extra pain medications.  No new swellings.  Energy is fine.  No infections.  No shortness of breath.  No GI problems.  Migraine headaches are fine.  Continues to be on calcium and vitamin D.      ECOG 1    ROS:  Rest of the comprehensive review of the system was negative.        I reviewed other history in epic as below.       PAST MEDICAL HISTORY:     1.  Breast cancer  2.  Multiple sclerosis.   3.  Depression.   4.  Migraines.   5.  Hypertension.   6.  Cholecystectomy and umbilical hernia repair.     SOCIAL HISTORY: She smoked for 5 years but quit many years ago.  Drinks alcohol socially.  She lives with her .  She is a teacher in middle school.       FAMILY HISTORY: She mentions that her mother had breast cancer at 49.  Both grandmothers had breast cancer but she is not sure of the age.  A couple of cousins have cancers.  Patient has 3 children who are healthy.    Current Outpatient Medications   Medication     acetaminophen (TYLENOL) 500 MG tablet     Buprenorphine HCl (BELBUCA) 150 MCG FILM buccal film     Buprenorphine HCl (BELBUCA) 300 MCG FILM buccal film     busPIRone (BUSPAR) 15 MG tablet     calcium citrate-vitamin D (CITRACAL) 315-250 MG-UNIT TABS     Cholecalciferol (VITAMIN D3 PO)     ELIQUIS ANTICOAGULANT 5 MG tablet     erenumab-aooe (AIMOVIG) 140 MG/ML injection     ibuprofen (ADVIL/MOTRIN) 600 MG tablet     LORazepam (ATIVAN) 1 MG tablet     morphine (MSIR) 15 MG IR tablet     naratriptan (AMERGE) 2.5 MG tablet     ondansetron (ZOFRAN ODT) 4 MG ODT tab     promethazine (PHENERGAN) 25 MG tablet     propranolol ER (INDERAL LA) 80 MG 24 hr capsule     traZODone (DESYREL) 50 MG tablet     ubrogepant (UBRELVY) 50 MG tablet     venlafaxine (EFFEXOR XR) 75 MG 24 hr capsule     vitamin B-2 (RIBOFLAVIN) 25 MG TABS tablet     No current facility-administered medications for this visit.        Allergies   Allergen Reactions     Bactrim [Sulfamethoxazole  W/Trimethoprim]      Internal bleeding     Copaxone [Glatiramer] Hives          PHYSICAL EXAMINATION:     BP (!) 144/91 (BP Location: Left arm, Patient Position: Sitting, Cuff Size: Adult Regular)   Pulse 55   Temp 97.5  F (36.4  C) (Oral)   Resp 16   Wt 63.6 kg (140 lb 3.2 oz)   SpO2 100%   BMI 25.97 kg/m    Wt Readings from Last 4 Encounters:   03/01/23 63.6 kg (140 lb 3.2 oz)   02/03/23 61.5 kg (135 lb 8 oz)   01/25/23 62.9 kg (138 lb 11.2 oz)   12/28/22 62.1 kg (136 lb 12.8 oz)         CONSTITUTIONAL: no acute distress  EYES: PERRLA, no palor or icterus.   ENT/MOUTH: no mouth lesions. Ears normal  CVS: s1s2 no m r g .   RESPIRATORY: clear to auscultation b/l  GI: soft non tender no hepatosplenomegaly  NEURO: AAOX3  Grossly non focal neuro exam  INTEGUMENT: no obvious rashes  LYMPHATIC: no palpable cervical, supraclavicular, axillary or inguinal LAD  MUSCULOSKELETAL: Unremarkable. No bony tenderness.   EXTREMITIES: no edema  PSYCH: Mentation, mood and affect are normal. Decision making capacity is intact        Labs and Imaging.    Reviewed.    2/3/2023  CBC shows WBC 3.8.  ANC 2.2.  Hemoglobin 13.3.  Platelets 155.    CMP unremarkable.      CA 15-3 was 123.      11/30/2022     CA 15-3 was 135.    9/30/2022  CA 15-3 was 159.  CA 27-29 was 1992 on 6/10/2022  CA 27-29 was 3370 on 5/13/2022.  It was 5307 on 4/14/2022 ferritin Doxil was started.      9/28/2021.      Iron studies, ferritin is 101, iron 51, TIBC 268 and iron saturation index 19%.    11/30/2022-x-ray of the ribs of the left side  There are a few sclerotic lesion seen in the right anterior ribs that were seen on previous PET CTs. These appear to involve the fourth and fifth ribs. There are likely more subtle lesions that were   described on the PET CT scan. Irregularity of the distal end of the left ninth rib may be from metastatic disease or fracture on the oblique view which likely is chronic.       10/18/2022-PET/CT showed no new suspicious FDG  uptake within the skeleton.  Stable abnormal FDG avid lytic and sclerotic osseous lesions.  Mild diffuse FDG uptake throughout the large bowel without CT abnormality.    7/1/2022.  PET/CT shows diffuse sclerotic and lytic osseous lesions without any abnormal FDG uptake in the skeleton, consistent with treated disease.  There is evaluation of previous hypermetabolic bone lesions.  Multiple chronic rib fracture deformities and stable compression fracture deformities of T6, L1 and L4.  No suspicious FDG uptake in the chest abdomen or pelvis.    3/31/2022-PET/CT shows progressive bone metastasis with progressive FDG uptake in the following lesions. Left iliac crest lesion with max SUV of 7.2, previously 3.5. Right mid iliac crest lytic lesion shows maximum SUV of 7.8, previously 6.9.  T10 vertebral body mixed lytic and sclerotic lesion with an SUV of 7.1, previously not hypermetabolic. Thoracic vertebral bodies 8, 7, 5, and 4 also show hypermetabolic lesions were previously there was no hypermetabolism.  Some of the sclerotic osseous lesions are unchanged and do not show increased hypermetabolism compatible treated lesion such as a severe compression deformity at L1 as well as superior compression deformity at L4.  Medial right clavicle sclerotic lesion with SUV max of 4.8, previously not hypermetabolic. There is also right-sided sternal lesions.      Biopsy from the right acetabulum shows metastatic lobular carcinoma.  It is triple negative.  Androgen receptor was diffusely positive (90%, moderate intensity) by immunohistochemistry. )  PD-L1 negative.    Foundation one showed CDH1 mutation (initially lobular cancer), CCND1 amplification ( equivocal ). FGF3, FGF4, FGF19 amplification ( Equivocal ). Most promising is CCND1 amplification with abemaciclib showing response in 4/10 patients. FGF3,FGF4 and FGF19 are targetable with pan-FGFR inhibitors.           ASSESSMENT AND PLAN:     Metastatic breast cancer negative  breast cancer (androgen receptor positive ) with extensive involvement of the bones.  There is also a left lower lobe hypermetabolic lesion and mediastinal lymph nodes which are hypermetabolic.  The biopsy from the right iliac bone is consistent with metastatic lobular breast cancer but it is hormone receptor and HER-2 negative and PDL 1 is also negative.    Foundation 1 testing did not reveal any actionable mutations.    She was intolerant to Xeloda and progressed on Taxol and eribulin.      We then switched to recently FDA approved sacituzumab govitecan-hziy (Trodelvy) for TNBC, based on the results of the phase II IMMU-132-01 clinical trial.   She started this on 11/11/2020.      She obtained a second opinion from Dr. Castillo from [facility] who recommended a repeat biopsy to be done to make sure that she really has triple negative breast cancer.      She also met with Dr. Arelis Arshad on 3/18/2021.      After 10 cycles of Trodelvy, she had PET/CT on 6/11/2021 which showed mildly increased uptake in some of the bone lesions while stability in other bone lesions.        After 15 cycles, repeat PET scan showed stable findings.  CA 27-29 has been increasing and it is 1176.      As she was tolerating the chemotherapy well and her quality of life has been decent and scans were stable although she had a rising CA 27-29, we decided on continuing the same chemotherapy because it has been a very slow progression of the disease.    Then she had clear progression of the disease in the bones with increased FDG uptake in both right and left iliac bones and the sternum.    Foundation one showed CDH1 mutation (initially lobular cancer), CCND1 amplification ( equivocal ). FGF3, FGF4, FGF19 amplification ( Equivocal ). Most promising is CCND1 amplification with abemaciclib showing response in 4/10 patients. FGF3,FGF4 and FGF19 are targetable with pan-FGFR inhibitor    As the tumor is diffusely positive for androgen  receptor, we decided to start her on androgen receptor blockers.    I initially recommended enzalutamide 160 mg daily ( Enzalutamide for the Treatment of Androgen Receptor-Expressing Triple-Negative Breast Cancer----J Clin Oncol. 2018 Mar 20; 36(9): 884-890. ).    Eventually we decided to start her on Casodex 150 mg daily instead of Enzalutamide due to cost issues.  Clinical Trial Clin Cancer Res. 2013 Oct 1;19(19):5505-12.   Phase II trial of bicalutamide in patients with androgen receptor-positive, estrogen receptor-negative metastatic Breast Cancer  She started Casodex on 1/6/2022.    She had progressive disease in the bones on the PET scan on 3/31/2022.    We changed to single agent Doxil on 4/14/2022.      She developed significant nausea vomiting and headache so cycle #2 was given on 5/13/2022 with 20% dose reduction which she tolerated well.    Cycle #3 was given on 6/10/2022 with the same dose reduced Doxil.    Repeat PET/CT on 7/1/2022 shows resolution of the FDG avid bony metastasis consistent with treated disease.  No suspicious FDG uptake was seen in the chest abdomen and pelvis.  CA 27-29 was also coming down.     Repeat PET/CT in October 2022 after completing 7 cycles continued to show normal FDG uptake.  CA 15-3 was trending down     Cycle #11 was delayed by 1 week because of chemotherapy-induced neutropenia.  It was received on 2/3/2023.    Overall tolerating chemotherapy well.  She will proceed with cycle #12 on 3/3/2023.  Most likely I will repeat PET/CT after cycle #12       Chemotherapy induced neutropenia.  We will give her Onpro.          Baseline echocardiogram on 4/7/2022 was unremarkable with EF 60 to 65%.    Bone disease.  She has metastatic disease to the bone.  Previously she got palliative radiation to T12-L5 3000 cGy in 10 fractions from 9/6/2019 till 9/18/2019.  She was evaluated by orthopedics Dr. Bipin Elliott on 11/1/2019 and conservative management was recommended.    She was  on Zometa every 3 months. She last received on 9/29/2020.  Because of progression of the disease in the bones, we switched to Xgeva which she received on 11/11/2020.    She had progression of the disease in the bones so we switched to Doxil but we continued xgeva.  PET scan on 7/1/2022 does not show any suspicious FDG uptake in the bones consistent with treated disease.  Repeat PET/CT on 10/18/2022 also does not show any FDG avid lesions in the skeleton.  Overall pain is under good control with buprenorphine buccal film and she has not required as needed morphine although recently she has noticed slightly more pain in the lower back.  We will check PET scan as mentioned above.  Continue Xgeva calcium and vitamin D.        Pain management.  Doing well with buprenorphine buccal film and she has as needed morphine which she has not used recently.  Follow-up with palliative care.       Migraines.  Doing better with monthly Aimovig injections and takes Amerge as needed for migraines.  Following with neurology.        Bilateral pulmonary emboli.  Remains on Eliquis which she should continue indefinitely.        We did not address the following today.      Shingles.  The rash has dried out.  She completed well with acyclovir and prednisone.  She takes gabapentin for postherpetic neuralgia and is doing better.        Constipation. Continue senna.    Neuropathy.  This remains mild and stable with neuropathy in her hands.    This is in addition to the chronic balance issues and heat and cold sensitivity from underlying multiple sclerosis which is also stable for years.  Continue to monitor.     Subcutaneous nodule in the scalp.  This looks like an epidermoid cyst.  She thinks it is a stable.  Continue to monitor.       Insomnia.  Continue trazodone.      Wall thickening of esophagus and FDG uptake of fundus of the stomach.  It could be in the setting of inflammation from recent nausea and vomiting.  Symptoms have resolved.   Continue to monitor clinically.    Vision changes.    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  It was thought that this could be due to Taxol as patient reported to the ophthalmologist that she was on Taxol in September 2019 when her symptoms started.  But she was not on Taxol in September 2019 when she started noticing the visual changes so Taxol is not responsible for this.  The first dose of Taxol was on 11/5/2019.   She had left cataract extraction on 2/8/2021 and she has noticed significant improvement in her vision.       Increased FDG ability in the colon.  She had FDG uptake in the cecum and ascending colon but no corresponding lesion was seen on the CT scan.  She is completely asymptomatic so at this time we will continue to observe.    Discussion regarding healthcare directives.  On previous visit she told me that she will bring the health care directive for our records but she forgot so I told her to bring it on next visit.      Breast implant removal.  She tells me that she was planning to do breast implant removal because there was a recall on this.  Her surgery was scheduled for 9/24/2019.  Because of this new development of metastatic breast cancer and her recent radiation, surgery has been canceled.  We discussed that at this time treatment for metastatic breast cancer would take precedence over the other surgery as the other surgery would require her to be off chemotherapy for several weeks and in that case the chances of cancer progression would be high and I would not recommend that.    Multiple sclerosis.  She will continue to follow with her neurologist Dr. Quiles.  Currently multiple sclerosis is under control and currently she is not taking any medications.    Return to clinic in 4 weeks with PET scan before that    All of her questions were answered to her satisfaction.  She is agreeable and comfortable with the plan.    Dewayne Zepeda MD

## 2023-03-01 NOTE — NURSING NOTE
"Oncology Rooming Note    March 1, 2023 4:14 PM   Shaneka Alvarez is a 62 year old female who presents for:    Chief Complaint   Patient presents with     Oncology Clinic Visit     Bone metastases (H)     Initial Vitals: BP (!) 144/91 (BP Location: Left arm, Patient Position: Sitting, Cuff Size: Adult Regular)   Pulse 55   Temp 97.5  F (36.4  C) (Oral)   Resp 16   Wt 63.6 kg (140 lb 3.2 oz)   SpO2 100%   BMI 25.97 kg/m   Estimated body mass index is 25.97 kg/m  as calculated from the following:    Height as of 11/30/22: 1.565 m (5' 1.61\").    Weight as of this encounter: 63.6 kg (140 lb 3.2 oz). Body surface area is 1.66 meters squared.  Data Unavailable Comment: Data Unavailable   No LMP recorded. Patient is postmenopausal.  Allergies reviewed: Yes  Medications reviewed: Yes    Medications: Medication refills not needed today.  Pharmacy name entered into [facility]:    [facility]  - [address] [facility] [address] [place] [id number] [facility] [address]     Clinical concerns: Patient states that she has some low back pain-had a fall yesterday not related to back pain.        Helene Ambriz LPN March 1, 2023 4:15 PM              "

## 2023-03-03 ENCOUNTER — INFUSION THERAPY VISIT (OUTPATIENT)
Dept: INFUSION THERAPY | Facility: CLINIC | Age: 63
End: 2023-03-03
Payer: COMMERCIAL

## 2023-03-03 ENCOUNTER — LAB (OUTPATIENT)
Dept: ONCOLOGY | Facility: CLINIC | Age: 63
End: 2023-03-03
Payer: COMMERCIAL

## 2023-03-03 VITALS
TEMPERATURE: 98.4 F | BODY MASS INDEX: 26.19 KG/M2 | DIASTOLIC BLOOD PRESSURE: 65 MMHG | WEIGHT: 141.4 LBS | HEART RATE: 65 BPM | RESPIRATION RATE: 16 BRPM | SYSTOLIC BLOOD PRESSURE: 111 MMHG | OXYGEN SATURATION: 100 %

## 2023-03-03 DIAGNOSIS — C50.919 METASTATIC BREAST CANCER: ICD-10-CM

## 2023-03-03 DIAGNOSIS — D70.1 CHEMOTHERAPY-INDUCED NEUTROPENIA (H): ICD-10-CM

## 2023-03-03 DIAGNOSIS — E87.6 HYPOKALEMIA: Primary | ICD-10-CM

## 2023-03-03 DIAGNOSIS — C50.912 BILATERAL MALIGNANT NEOPLASM OF BREAST IN FEMALE, UNSPECIFIED ESTROGEN RECEPTOR STATUS, UNSPECIFIED SITE OF BREAST (H): ICD-10-CM

## 2023-03-03 DIAGNOSIS — T45.1X5A CHEMOTHERAPY-INDUCED NEUTROPENIA (H): ICD-10-CM

## 2023-03-03 DIAGNOSIS — C50.911 BILATERAL MALIGNANT NEOPLASM OF BREAST IN FEMALE, UNSPECIFIED ESTROGEN RECEPTOR STATUS, UNSPECIFIED SITE OF BREAST (H): ICD-10-CM

## 2023-03-03 DIAGNOSIS — C79.51 BONE METASTASES: ICD-10-CM

## 2023-03-03 LAB
ALBUMIN SERPL-MCNC: 3.2 G/DL (ref 3.4–5)
ALP SERPL-CCNC: 72 U/L (ref 40–150)
ALT SERPL W P-5'-P-CCNC: 28 U/L (ref 0–50)
ANION GAP SERPL CALCULATED.3IONS-SCNC: 5 MMOL/L (ref 3–14)
AST SERPL W P-5'-P-CCNC: 22 U/L (ref 0–45)
BASOPHILS # BLD MANUAL: 0 10E3/UL (ref 0–0.2)
BASOPHILS NFR BLD MANUAL: 2 %
BILIRUB SERPL-MCNC: 0.3 MG/DL (ref 0.2–1.3)
BUN SERPL-MCNC: 14 MG/DL (ref 7–30)
CALCIUM SERPL-MCNC: 9 MG/DL (ref 8.5–10.1)
CANCER AG15-3 SERPL-ACNC: 123 U/ML
CHLORIDE BLD-SCNC: 103 MMOL/L (ref 94–109)
CO2 SERPL-SCNC: 30 MMOL/L (ref 20–32)
CREAT SERPL-MCNC: 0.74 MG/DL (ref 0.52–1.04)
EOSINOPHIL # BLD MANUAL: 0.1 10E3/UL (ref 0–0.7)
EOSINOPHIL NFR BLD MANUAL: 5 %
ERYTHROCYTE [DISTWIDTH] IN BLOOD BY AUTOMATED COUNT: 14.6 % (ref 10–15)
GFR SERPL CREATININE-BSD FRML MDRD: >90 ML/MIN/1.73M2
GLUCOSE BLD-MCNC: 108 MG/DL (ref 70–99)
HCT VFR BLD AUTO: 34.9 % (ref 35–47)
HGB BLD-MCNC: 11.7 G/DL (ref 11.7–15.7)
LYMPHOCYTES # BLD MANUAL: 0.3 10E3/UL (ref 0.8–5.3)
LYMPHOCYTES NFR BLD MANUAL: 14 %
MCH RBC QN AUTO: 30.2 PG (ref 26.5–33)
MCHC RBC AUTO-ENTMCNC: 33.5 G/DL (ref 31.5–36.5)
MCV RBC AUTO: 90 FL (ref 78–100)
MONOCYTES # BLD MANUAL: 0.6 10E3/UL (ref 0–1.3)
MONOCYTES NFR BLD MANUAL: 25 %
NEUTROPHILS # BLD MANUAL: 1.3 10E3/UL (ref 1.6–8.3)
NEUTROPHILS NFR BLD MANUAL: 54 %
PLAT MORPH BLD: ABNORMAL
PLATELET # BLD AUTO: 154 10E3/UL (ref 150–450)
POTASSIUM BLD-SCNC: 3.7 MMOL/L (ref 3.4–5.3)
PROT SERPL-MCNC: 6.5 G/DL (ref 6.8–8.8)
RBC # BLD AUTO: 3.87 10E6/UL (ref 3.8–5.2)
RBC MORPH BLD: ABNORMAL
SODIUM SERPL-SCNC: 138 MMOL/L (ref 133–144)
WBC # BLD AUTO: 2.4 10E3/UL (ref 4–11)

## 2023-03-03 PROCEDURE — 86300 IMMUNOASSAY TUMOR CA 15-3: CPT | Performed by: INTERNAL MEDICINE

## 2023-03-03 PROCEDURE — 80053 COMPREHEN METABOLIC PANEL: CPT | Performed by: INTERNAL MEDICINE

## 2023-03-03 PROCEDURE — 99207 PR NO CHARGE LOS: CPT

## 2023-03-03 PROCEDURE — 85007 BL SMEAR W/DIFF WBC COUNT: CPT | Performed by: INTERNAL MEDICINE

## 2023-03-03 PROCEDURE — 96367 TX/PROPH/DG ADDL SEQ IV INF: CPT | Performed by: INTERNAL MEDICINE

## 2023-03-03 PROCEDURE — 96413 CHEMO IV INFUSION 1 HR: CPT | Performed by: INTERNAL MEDICINE

## 2023-03-03 PROCEDURE — 96377 APPLICATON ON-BODY INJECTOR: CPT | Mod: 59 | Performed by: INTERNAL MEDICINE

## 2023-03-03 PROCEDURE — 96372 THER/PROPH/DIAG INJ SC/IM: CPT | Mod: 59 | Performed by: INTERNAL MEDICINE

## 2023-03-03 PROCEDURE — 85027 COMPLETE CBC AUTOMATED: CPT | Performed by: INTERNAL MEDICINE

## 2023-03-03 RX ORDER — HEPARIN SODIUM (PORCINE) LOCK FLUSH IV SOLN 100 UNIT/ML 100 UNIT/ML
5 SOLUTION INTRAVENOUS
Status: DISCONTINUED | OUTPATIENT
Start: 2023-03-03 | End: 2023-03-03 | Stop reason: HOSPADM

## 2023-03-03 RX ORDER — HEPARIN SODIUM (PORCINE) LOCK FLUSH IV SOLN 100 UNIT/ML 100 UNIT/ML
5 SOLUTION INTRAVENOUS ONCE
Status: COMPLETED | OUTPATIENT
Start: 2023-03-03 | End: 2023-03-03

## 2023-03-03 RX ADMIN — HEPARIN SODIUM (PORCINE) LOCK FLUSH IV SOLN 100 UNIT/ML 5 ML: 100 SOLUTION at 08:37

## 2023-03-03 RX ADMIN — HEPARIN SODIUM (PORCINE) LOCK FLUSH IV SOLN 100 UNIT/ML 5 ML: 100 SOLUTION at 10:54

## 2023-03-03 ASSESSMENT — PAIN SCALES - GENERAL: PAINLEVEL: MILD PAIN (3)

## 2023-03-03 NOTE — PROGRESS NOTES
"Patient's name and  were verified.  See Doc Flowsheet - IV assess for details.  IVAD accessed with 20G 3/4\" mcnair gripper plus needle  blood return positive: YES  Site without redness, tenderness or swelling: YES  flushed with 10cc NS and 5cc 100u/ml heparin  Needle: left in place for chemo today  Comments: Labs drawn.  Patient tolerated procedure without incident.      "

## 2023-03-03 NOTE — PROGRESS NOTES
Infusion Nursing Note:  Shaneka Alvarez presents today for C12D1 Doxil, Onpro and Xgeva.    Patient seen by provider today: No-saw Dr. Zepeda 3/1.   present during visit today: Not Applicable.    Note: Pt voices no new concerns today. Pt to have a PET/CT 3/27 prior to her next treatment.    Intravenous Access:  Implanted Port.    Treatment Conditions:  Lab Results   Component Value Date    HGB 11.7 03/03/2023    WBC 2.4 (L) 03/03/2023    ANEU 1.3 (L) 03/03/2023    ANEUTAUTO 2.2 02/03/2023     03/03/2023      Lab Results   Component Value Date     03/03/2023    POTASSIUM 3.7 03/03/2023    MAG 2.0 04/21/2022    CR 0.74 03/03/2023    RAFAELA 9.0 03/03/2023    BILITOTAL 0.3 03/03/2023    ALBUMIN 3.2 (L) 03/03/2023    ALT 28 03/03/2023    AST 22 03/03/2023     Results reviewed, labs MET treatment parameters, ok to proceed with treatment.    Post Infusion Assessment:  Patient tolerated infusion without incident.  Patient tolerated injection without incident.  Blood return noted pre and post infusion.  Site patent and intact, free from redness, edema or discomfort.  No evidence of extravasations.  Access discontinued per protocol.     ONPRO  Was placed on patients: Left arm     Time was placed at: 1055    Podpal used: YES    ONPRO device lot number: [id number]    Patient educated on device, what colored lights mean, when to remove the device, and when/how to contact provider with questions/concerns. Neulasta administration will occur at 1355 on 3/4/23.     Discharge Plan:   AVS to patient via [employer].  Patient will return 3/31/23 for next appointment.   Patient discharged in stable condition accompanied by: self.  Departure Mode: Ambulatory.      Laura Duncan RN

## 2023-03-07 NOTE — PROGRESS NOTES
Palliative Care Outpatient Clinic Progress Note    Patient Name: Shaneka Alvarez is being evaluated via a billable video visit.    Primary Provider:  [facility], [facility]  Primary Oncologist: Dr Zepeda  Last seen by palliative care: myself, 1/4/23      Chief Complaint: migraine    Background/Summary  Medical:  - breast cancer ER+/MI+, Her2 neg diagnosed in 2006              - s/p systemic treatment with adriamycin, cytoxan, avastin, aromatase inhibitor as well as b/l mastectomy              - relapsed metastatic disease found in skull, vertebrae complicated by pathological fractures L1, L5              - s/p XRT to T12-L5 Sept 2019. Didn't tolerate Xeloda, paclitaxel Nov 2019-Jan 2020, treated with eribulin and zometa. PD seen on PET Nov 2020 as well as a new L 4th rib concerning for a pathologic fracture. Started Trodelvy 11/11/20, PD Dec 21, switched to casodex.               - PD again on PET/CT March 2022 including b/l iliac crest lesions, R clavicle, and multifocal T spine involvement. Switched to single agent Doxil April 2022, dose decrease to 80% at 2nd cycle in light of side effects. Tolerated that well. Complete response on scan Oct 2022  - b/l PE found on PET July 2020, on anticoagulation  - MS with mild right-sided weakness    - long-standing history of migraines, follows with neurology. On monthly aimovig with ubrelvy prn  - post-zoster neuralgia (ED Visit July 2022), completely resolved         Social  She lives with her    6 adult children, 1 lives locally  Middle-, currently not working. She misses it, but also realizes that many days she wouldn't be able to go to work. 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 This would allow her to spend some time doing work there and maintain control over her schedule.     Plans to go to [place] with a friend for a few days later this month. One of her daughters lives there.     Psychospiritual  Has been through  traumatic experiences in the past. Addressed these with counselors prior to her diagnosis. Doing quite well now.      Care Planning   POLST completed 20: DNR/selective treatments     Opioid Safety   Expected prognosis: > 1 year  Risk: Low (ORT 0)  Indication for Opioid Use: Moderate or Strong  Naloxone Script provided if patient also on benzodiazepine: yes  Indications for Tapering reviewed: good dose decrease since on buprenorphine     : reviewed, ok. Last Morphine fill for 120 tabs 2022    Interim History:  At the last visit we discussed migraine (was working on this with neurology), SI joint pain (referral to PT, increased belbuca)    Patient is on the call by herself  History gathered today from: patient, medical chart    She has had a migraine for the past 4 days. She is seeing neurology, recently increased the amovig dose, which initially seemed to help. Shaneka got touch with them about the current episode.    In addition Shaneka has increased low back pain, around the SI joints. She hasn't seen PT yet, but is setting up an appointment. I highlight the value of PT for SI joint pain.   She had considered once to take morphine for this pain, but didn't end up taking any, since she doesn't want to treat her MSK pain with opioids. I reinforce this plan.       Impression & Recommendations & Counseliny/o woman with metastatic breast cancer currently PENG on doxil treatment. Course complicated by migraines and SI joint pain    SI joint pain: referral to PT should still be active. Strongly encouraged Shaneka to set up an appointment asap    Migraines: follows with neurology    Cancer-related pain: well controlled with belbuca. Once her migraines are more consistently controlled, will discuss tapering      Return to clinic in 2-3 months    Data / Chart Review:    Review of Systems:   ROS: 10 point ROS neg other than the symptoms noted above in the HPI and pertinents here.         Physical Exam:   Physical  Exam:  Vital Signs not checked, virtual visit    CONSTIT: awake, appears uncomfortable (diffusely, likely 2/2 migraine)  EENT: MMM, EOMI, no icterus  RESP: reg, nl effort, no cough  MSK: moves x4, SABINA  SKIN: no rash, no obvious lesions  NEURO: alert, oriented x3  PSYCH: appropriate affect, memory and thought process intact    The rest of a comprehensive physical examination is deferred  due to public health emergency video visit restrictions.      Current pertinent medications:  Belbuca 150-0-300  MSIR 15-30mg q3h prn              Naloxone prescribed  acetaminophen 1000mg tid  Ibuprofen 600mg q6h prn  CBD cream     Dexamethasone 4mg q12h     Venlafaxine 225mg daily  Lorazepam 1mg q4h prn anxiety, nausea  Trazodone 50mg HS  Buspirone 15mg bid      No pertinent allergies      Lab and imaging data reviewed:  Comments:           Video-Visit Details    Type of service:  Video Visit    Physician has received verbal consent for a Video Visit from the patient? Yes    Video Start Time: 1121    Video End Time (time video stopped): 1137    Originating Location (pt. Location): Home    Distant Location (provider location):  [facility]   Distant Location (provider location):  On-site    Mode of Communication:  Video Conference via Sabrina Whitaker MD  Palliative Medicine  Pager [phone]

## 2023-03-08 ENCOUNTER — VIRTUAL VISIT (OUTPATIENT)
Dept: ONCOLOGY | Facility: CLINIC | Age: 63
End: 2023-03-08
Payer: COMMERCIAL

## 2023-03-08 DIAGNOSIS — C50.919 METASTATIC BREAST CANCER: Primary | ICD-10-CM

## 2023-03-08 PROCEDURE — 99213 OFFICE O/P EST LOW 20 MIN: CPT | Mod: VID | Performed by: HOSPITALIST

## 2023-03-08 NOTE — PATIENT INSTRUCTIONS
Patient Instructions      Expand All Collapse All    Thank you for attending the Palliative Care Clinic appointment today.     No changes today     Call us at least 3 workdays in advance if you need a medication refill.      Return to clinic in 2-3 months for a follow up.     You can reach the Palliative Care Team during business hours at the following number:    - [phone]  - or send me a [employer] message    To reach the Palliative Care Provider on-call After-hours or on holidays and weekends, call: [phone].  Please note that we are not able to provide pain medication refills on evenings or weekends.

## 2023-03-08 NOTE — NURSING NOTE
Is the patient currently in the state of [place]? YES    Visit mode:VIDEO    If the visit is dropped, the patient can be reconnected by: VIDEO VISIT: Text to cell phone: [phone]    Will anyone else be joining the visit? NO      How would you like to obtain your AVS? MyChart    Are changes needed to the allergy or medication list? NO    Reason for visit: return video visit    Sonia Watson, TATYANA

## 2023-03-08 NOTE — NURSING NOTE
Patient declined individual allergy and medication review by support staff because medications and allergies were reviewed on 3/3 and pt states no changes.    Sonia Watson, VF

## 2023-03-08 NOTE — LETTER
3/8/2023         RE: Shaneka Alvarez  [id number] [facility] [address]        Dear Colleague,    Thank you for referring your patient, Shaneka Alvarez, to the [facility]. Please see a copy of my visit note below.    Palliative Care Outpatient Clinic Progress Note    Patient Name: Shaneka Alvarez is being evaluated via a billable video visit.    Primary Provider:  Clinic, [facility]  Primary Oncologist: Dr Zepeda  Last seen by palliative care: myself, 1/4/23      Chief Complaint: migraine    Background/Summary  Medical:  - breast cancer ER+/FL+, Her2 neg diagnosed in 2006              - s/p systemic treatment with adriamycin, cytoxan, avastin, aromatase inhibitor as well as b/l mastectomy              - relapsed metastatic disease found in skull, vertebrae complicated by pathological fractures L1, L5              - s/p XRT to T12-L5 Sept 2019. Didn't tolerate Xeloda, paclitaxel Nov 2019-Jan 2020, treated with eribulin and zometa. PD seen on PET Nov 2020 as well as a new L 4th rib concerning for a pathologic fracture. Started Trodelvy 11/11/20, PD Dec 21, switched to casodex.               - PD again on PET/CT March 2022 including b/l iliac crest lesions, R clavicle, and multifocal T spine involvement. Switched to single agent Doxil April 2022, dose decrease to 80% at 2nd cycle in light of side effects. Tolerated that well. Complete response on scan Oct 2022  - b/l PE found on PET July 2020, on anticoagulation  - MS with mild right-sided weakness    - long-standing history of migraines, follows with neurology. On monthly aimovig with ubrelvy prn  - post-zoster neuralgia (ED Visit July 2022), completely resolved         Social  She lives with her    6 adult children, 1 lives locally  Middle-, currently not working. She misses it, but also realizes that many days she wouldn't be able to go to work. 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 This would allow her to spend some time doing work there and maintain control over her schedule.     Plans to go to [place] with a friend for a few days later this month. One of her daughters lives there.     Psychospiritual  Has been through traumatic experiences in the past. Addressed these with counselors prior to her diagnosis. Doing quite well now.      Care Planning   POLST completed 20: DNR/selective treatments     Opioid Safety   Expected prognosis: > 1 year  Risk: Low (ORT 0)  Indication for Opioid Use: Moderate or Strong  Naloxone Script provided if patient also on benzodiazepine: yes  Indications for Tapering reviewed: good dose decrease since on buprenorphine     : reviewed, ok. Last Morphine fill for 120 tabs 2022    Interim History:  At the last visit we discussed migraine (was working on this with neurology), SI joint pain (referral to PT, increased belbuca)    Patient is on the call by herself  History gathered today from: patient, medical chart    She has had a migraine for the past 4 days. She is seeing neurology, recently increased the amovig dose, which initially seemed to help. Shaneka got touch with them about the current episode.    In addition Shaneka has increased low back pain, around the SI joints. She hasn't seen PT yet, but is setting up an appointment. I highlight the value of PT for SI joint pain.   She had considered once to take morphine for this pain, but didn't end up taking any, since she doesn't want to treat her MSK pain with opioids. I reinforce this plan.       Impression & Recommendations & Counseliny/o woman with metastatic breast cancer currently PENG on doxil treatment. Course complicated by migraines and SI joint pain    SI joint pain: referral to PT should still be active. Strongly encouraged Shaneka to set up an appointment asap    Migraines: follows with neurology    Cancer-related pain: well controlled with belbuca. Once her migraines are  more consistently controlled, will discuss tapering      Return to clinic in 2-3 months    Data / Chart Review:    Review of Systems:   ROS: 10 point ROS neg other than the symptoms noted above in the HPI and pertinents here.         Physical Exam:   Physical Exam:  Vital Signs not checked, virtual visit    CONSTIT: awake, appears uncomfortable (diffusely, likely 2/2 migraine)  EENT: MMM, EOMI, no icterus  RESP: reg, nl effort, no cough  MSK: moves x4, SABINA  SKIN: no rash, no obvious lesions  NEURO: alert, oriented x3  PSYCH: appropriate affect, memory and thought process intact    The rest of a comprehensive physical examination is deferred  due to public health emergency video visit restrictions.      Current pertinent medications:  Belbuca 150-0-300  MSIR 15-30mg q3h prn              Naloxone prescribed  acetaminophen 1000mg tid  Ibuprofen 600mg q6h prn  CBD cream     Dexamethasone 4mg q12h     Venlafaxine 225mg daily  Lorazepam 1mg q4h prn anxiety, nausea  Trazodone 50mg HS  Buspirone 15mg bid      No pertinent allergies      Lab and imaging data reviewed:  Comments:           Video-Visit Details    Type of service:  Video Visit    Physician has received verbal consent for a Video Visit from the patient? Yes    Video Start Time: 1121    Video End Time (time video stopped): 1137    Originating Location (pt. Location): Home    Distant Location (provider location):  [facility]   Distant Location (provider location):  On-site    Mode of Communication:  Video Conference via [employer]    Alva Whitaker MD  Palliative Medicine  Pager [phone]        Again, thank you for allowing me to participate in the care of your patient.        Sincerely,        Alva Whitaker MD

## 2023-03-09 ENCOUNTER — MYC MEDICAL ADVICE (OUTPATIENT)
Dept: ONCOLOGY | Facility: CLINIC | Age: 63
End: 2023-03-09
Payer: COMMERCIAL

## 2023-03-09 DIAGNOSIS — G89.3 CANCER ASSOCIATED PAIN: ICD-10-CM

## 2023-03-09 DIAGNOSIS — G43.009 MIGRAINE WITHOUT AURA AND WITHOUT STATUS MIGRAINOSUS, NOT INTRACTABLE: Primary | ICD-10-CM

## 2023-03-09 DIAGNOSIS — M53.3 SACROILIAC JOINT PAIN: ICD-10-CM

## 2023-03-12 ENCOUNTER — APPOINTMENT (OUTPATIENT)
Dept: CT IMAGING | Facility: CLINIC | Age: 63
End: 2023-03-12
Attending: EMERGENCY MEDICINE
Payer: COMMERCIAL

## 2023-03-12 ENCOUNTER — HOSPITAL ENCOUNTER (EMERGENCY)
Facility: CLINIC | Age: 63
Discharge: HOME | End: 2023-03-12
Attending: EMERGENCY MEDICINE | Admitting: EMERGENCY MEDICINE
Payer: COMMERCIAL

## 2023-03-12 VITALS
RESPIRATION RATE: 20 BRPM | HEART RATE: 66 BPM | TEMPERATURE: 98.1 F | HEIGHT: 62 IN | DIASTOLIC BLOOD PRESSURE: 90 MMHG | WEIGHT: 130 LBS | SYSTOLIC BLOOD PRESSURE: 131 MMHG | OXYGEN SATURATION: 98 % | BODY MASS INDEX: 23.92 KG/M2

## 2023-03-12 DIAGNOSIS — G43.901 MIGRAINE WITH STATUS MIGRAINOSUS, NOT INTRACTABLE, UNSPECIFIED MIGRAINE TYPE: ICD-10-CM

## 2023-03-12 LAB
ALBUMIN SERPL BCG-MCNC: 3.7 G/DL (ref 3.5–5.2)
ALP SERPL-CCNC: 116 U/L (ref 35–104)
ALT SERPL W P-5'-P-CCNC: 14 U/L (ref 10–35)
ANION GAP SERPL CALCULATED.3IONS-SCNC: 6 MMOL/L (ref 7–15)
AST SERPL W P-5'-P-CCNC: 24 U/L (ref 10–35)
BASOPHILS # BLD AUTO: 0.1 10E3/UL (ref 0–0.2)
BASOPHILS NFR BLD AUTO: 1 %
BILIRUB SERPL-MCNC: 0.2 MG/DL
BUN SERPL-MCNC: 10.9 MG/DL (ref 8–23)
CALCIUM SERPL-MCNC: 8.9 MG/DL (ref 8.8–10.2)
CHLORIDE SERPL-SCNC: 98 MMOL/L (ref 98–107)
CREAT SERPL-MCNC: 0.57 MG/DL (ref 0.51–0.95)
CRP SERPL-MCNC: 5.75 MG/L
DEPRECATED HCO3 PLAS-SCNC: 29 MMOL/L (ref 22–29)
EOSINOPHIL # BLD AUTO: 0.1 10E3/UL (ref 0–0.7)
EOSINOPHIL NFR BLD AUTO: 2 %
ERYTHROCYTE [DISTWIDTH] IN BLOOD BY AUTOMATED COUNT: 14.1 % (ref 10–15)
ERYTHROCYTE [SEDIMENTATION RATE] IN BLOOD BY WESTERGREN METHOD: 25 MM/HR (ref 0–30)
GFR SERPL CREATININE-BSD FRML MDRD: >90 ML/MIN/1.73M2
GLUCOSE SERPL-MCNC: 98 MG/DL (ref 70–99)
HCT VFR BLD AUTO: 35.4 % (ref 35–47)
HGB BLD-MCNC: 11.8 G/DL (ref 11.7–15.7)
IMM GRANULOCYTES # BLD: 0.2 10E3/UL
IMM GRANULOCYTES NFR BLD: 4 %
LYMPHOCYTES # BLD AUTO: 0.5 10E3/UL (ref 0.8–5.3)
LYMPHOCYTES NFR BLD AUTO: 9 %
MAGNESIUM SERPL-MCNC: 2 MG/DL (ref 1.7–2.3)
MCH RBC QN AUTO: 29.7 PG (ref 26.5–33)
MCHC RBC AUTO-ENTMCNC: 33.3 G/DL (ref 31.5–36.5)
MCV RBC AUTO: 89 FL (ref 78–100)
MONOCYTES # BLD AUTO: 0.3 10E3/UL (ref 0–1.3)
MONOCYTES NFR BLD AUTO: 6 %
NEUTROPHILS # BLD AUTO: 4.2 10E3/UL (ref 1.6–8.3)
NEUTROPHILS NFR BLD AUTO: 78 %
NRBC # BLD AUTO: 0 10E3/UL
NRBC BLD AUTO-RTO: 0 /100
PHOSPHATE SERPL-MCNC: 2.8 MG/DL (ref 2.5–4.5)
PLATELET # BLD AUTO: 90 10E3/UL (ref 150–450)
POTASSIUM SERPL-SCNC: 4.1 MMOL/L (ref 3.4–5.3)
PROT SERPL-MCNC: 6.3 G/DL (ref 6.4–8.3)
RBC # BLD AUTO: 3.97 10E6/UL (ref 3.8–5.2)
SODIUM SERPL-SCNC: 133 MMOL/L (ref 136–145)
WBC # BLD AUTO: 5.4 10E3/UL (ref 4–11)

## 2023-03-12 PROCEDURE — 84100 ASSAY OF PHOSPHORUS: CPT | Performed by: EMERGENCY MEDICINE

## 2023-03-12 PROCEDURE — 250N000011 HC RX IP 250 OP 636: Performed by: EMERGENCY MEDICINE

## 2023-03-12 PROCEDURE — 96375 TX/PRO/DX INJ NEW DRUG ADDON: CPT | Performed by: EMERGENCY MEDICINE

## 2023-03-12 PROCEDURE — 258N000003 HC RX IP 258 OP 636: Performed by: EMERGENCY MEDICINE

## 2023-03-12 PROCEDURE — 85025 COMPLETE CBC W/AUTO DIFF WBC: CPT | Performed by: EMERGENCY MEDICINE

## 2023-03-12 PROCEDURE — 36415 COLL VENOUS BLD VENIPUNCTURE: CPT | Performed by: EMERGENCY MEDICINE

## 2023-03-12 PROCEDURE — 83735 ASSAY OF MAGNESIUM: CPT | Performed by: EMERGENCY MEDICINE

## 2023-03-12 PROCEDURE — 86140 C-REACTIVE PROTEIN: CPT | Performed by: EMERGENCY MEDICINE

## 2023-03-12 PROCEDURE — 96365 THER/PROPH/DIAG IV INF INIT: CPT | Performed by: EMERGENCY MEDICINE

## 2023-03-12 PROCEDURE — 80053 COMPREHEN METABOLIC PANEL: CPT | Performed by: EMERGENCY MEDICINE

## 2023-03-12 PROCEDURE — 99284 EMERGENCY DEPT VISIT MOD MDM: CPT | Performed by: EMERGENCY MEDICINE

## 2023-03-12 PROCEDURE — 96361 HYDRATE IV INFUSION ADD-ON: CPT | Performed by: EMERGENCY MEDICINE

## 2023-03-12 PROCEDURE — 99285 EMERGENCY DEPT VISIT HI MDM: CPT | Mod: 25 | Performed by: EMERGENCY MEDICINE

## 2023-03-12 PROCEDURE — 70450 CT HEAD/BRAIN W/O DYE: CPT

## 2023-03-12 PROCEDURE — 85652 RBC SED RATE AUTOMATED: CPT | Performed by: EMERGENCY MEDICINE

## 2023-03-12 RX ORDER — DIPHENHYDRAMINE HYDROCHLORIDE 50 MG/ML
25 INJECTION INTRAMUSCULAR; INTRAVENOUS ONCE
Status: COMPLETED | OUTPATIENT
Start: 2023-03-12 | End: 2023-03-12

## 2023-03-12 RX ORDER — MAGNESIUM SULFATE 1 G/100ML
1 INJECTION INTRAVENOUS ONCE
Status: COMPLETED | OUTPATIENT
Start: 2023-03-12 | End: 2023-03-12

## 2023-03-12 RX ORDER — METOCLOPRAMIDE HYDROCHLORIDE 5 MG/ML
10 INJECTION INTRAMUSCULAR; INTRAVENOUS ONCE
Status: COMPLETED | OUTPATIENT
Start: 2023-03-12 | End: 2023-03-12

## 2023-03-12 RX ORDER — HEPARIN SODIUM (PORCINE) LOCK FLUSH IV SOLN 100 UNIT/ML 100 UNIT/ML
5-10 SOLUTION INTRAVENOUS
Status: DISCONTINUED | OUTPATIENT
Start: 2023-03-12 | End: 2023-03-12 | Stop reason: HOSPADM

## 2023-03-12 RX ORDER — DEXAMETHASONE SODIUM PHOSPHATE 10 MG/ML
10 INJECTION, SOLUTION INTRAMUSCULAR; INTRAVENOUS ONCE
Status: COMPLETED | OUTPATIENT
Start: 2023-03-12 | End: 2023-03-12

## 2023-03-12 RX ORDER — OLANZAPINE 5 MG/1
2.5-5 TABLET ORAL 2 TIMES DAILY PRN
COMMUNITY
Start: 2022-05-28

## 2023-03-12 RX ORDER — KETOROLAC TROMETHAMINE 15 MG/ML
15 INJECTION, SOLUTION INTRAMUSCULAR; INTRAVENOUS ONCE
Status: COMPLETED | OUTPATIENT
Start: 2023-03-12 | End: 2023-03-12

## 2023-03-12 RX ADMIN — MAGNESIUM SULFATE HEPTAHYDRATE 1 G: 1 INJECTION, SOLUTION INTRAVENOUS at 16:37

## 2023-03-12 RX ADMIN — DIPHENHYDRAMINE HYDROCHLORIDE 25 MG: 50 INJECTION, SOLUTION INTRAMUSCULAR; INTRAVENOUS at 16:24

## 2023-03-12 RX ADMIN — DEXAMETHASONE SODIUM PHOSPHATE 10 MG: 10 INJECTION, SOLUTION INTRAMUSCULAR; INTRAVENOUS at 16:32

## 2023-03-12 RX ADMIN — KETOROLAC TROMETHAMINE 15 MG: 15 INJECTION, SOLUTION INTRAMUSCULAR; INTRAVENOUS at 16:26

## 2023-03-12 RX ADMIN — SODIUM CHLORIDE 1000 ML: 9 INJECTION, SOLUTION INTRAVENOUS at 16:22

## 2023-03-12 RX ADMIN — Medication 5 ML: at 18:36

## 2023-03-12 RX ADMIN — METOCLOPRAMIDE HYDROCHLORIDE 10 MG: 5 INJECTION INTRAMUSCULAR; INTRAVENOUS at 16:28

## 2023-03-12 ASSESSMENT — ACTIVITIES OF DAILY LIVING (ADL): ADLS_ACUITY_SCORE: 35

## 2023-03-12 NOTE — ED TRIAGE NOTES
Headache for 8-9 days that is not going away.  History of migraines and has taken all her meds with no relief. Having a hard time swallowing her medications, feeling chilled and flushed.  Did at home covid test which was negative. Has been dizzy throughout this last week. Mentions she is metastatic breast cancer patient on chemo  Also having lots of low back pain and states the cancer is there

## 2023-03-12 NOTE — DISCHARGE INSTRUCTIONS
Please follow-up with your neurologist.  I recommend calling tomorrow to let them know about your long migraine and ED visit.    Continue your usual headache care.  Try to drink plenty of fluids and get rest.    If it anytime you have any concerns, worsening or changes return to the ED at any time.    I hope that you enjoy your barbecue and have a wonderful week!!

## 2023-03-13 ENCOUNTER — TELEPHONE (OUTPATIENT)
Dept: NEUROLOGY | Facility: CLINIC | Age: 63
End: 2023-03-13
Payer: COMMERCIAL

## 2023-03-13 NOTE — ED PROVIDER NOTES
History     Chief Complaint   Patient presents with     Dizziness     HPI  History per patient, medical records    This is a 62-year-old female, history of metastatic breast cancer currently undergoing chemotherapy, MS, migraines, anxiety/depression, GERD, PE on Eliquis, chronic pain on Belbuca, other history as below presenting with headache and dizziness.  Patient had sudden onset of headache 8 days ago.  She notes that the pain feels like her usual migraines but it came on more suddenly than usual.  She has pain behind her eyes and into her temples.  She has had a little blurry vision.  She has felt warm, no specific fevers.  She has had some dryness in her mouth and throat making it difficult to swallow her pills for the last couple of days.  She has sensitivity to sound and light.  She has baseline numbness in her left upper extremity.  She has a port in place.  No fevers or chills.  No sinus pain or pressure or drainage.  No chest pain or shortness of breath.  Normal bowel and bladder for her.  She fell in her driveway a couple of weeks ago but was not having any issues at that time.  She is followed closely by neurology and is on Aimovig monthly and Ubrelvy which she has taken in the course of this headache.  No neck pain or stiffness.    Allergies:  Allergies   Allergen Reactions     Bactrim [Sulfamethoxazole W/Trimethoprim]      Internal bleeding     Copaxone [Glatiramer] Hives       Problem List:    Patient Active Problem List    Diagnosis Date Noted     Elevated serum creatinine 03/18/2022     Priority: Medium     Hyponatremia 03/17/2022     Priority: Medium     Vomiting 03/17/2022     Priority: Medium     Bony metastasis (H) 03/17/2022     Priority: Medium     Acute kidney injury (H) 03/17/2022     Priority: Medium     Sepsis without acute organ dysfunction (H)-possible  03/17/2022     Priority: Medium     Closed fracture of pedicle of lumbar vertebra, initial encounter (H) 03/17/2022     Priority:  Medium     Acute midline low back pain, unspecified whether sciatica present 03/17/2022     Priority: Medium     Anemia, unspecified type 10/18/2021     Priority: Medium     Hyperphosphatemia 07/28/2021     Priority: Medium     Drug-induced polyneuropathy (H) 02/18/2021     Priority: Medium     Cancer associated pain 02/03/2021     Priority: Medium     Posterior subcapsular polar age-related cataract of both eyes 01/29/2021     Priority: Medium     Added automatically from request for surgery [id number]       Iris synechiae, bilateral 01/29/2021     Priority: Medium     Added automatically from request for surgery [id number]       Dehydration 12/15/2020     Priority: Medium     Chemotherapy induced nausea and vomiting 12/15/2020     Priority: Medium     Sinus tachycardia 12/15/2020     Priority: Medium     Drug-induced nausea and vomiting 12/15/2020     Priority: Medium     Chemotherapy-induced neutropenia (H) 11/10/2020     Priority: Medium     Pulmonary embolism without acute cor pulmonale, unspecified chronicity, unspecified pulmonary embolism type (H) 08/17/2020     Priority: Medium     Hypokalemia 07/28/2020     Priority: Medium     Bone metastases (H) 10/22/2019     Priority: Medium     Metastatic breast cancer (H) 09/18/2019     Priority: Medium     Metastatic disease (H) 08/31/2019     Priority: Medium     S/P breast reconstruction, bilateral 07/31/2018     Priority: Medium     Anxiety and depression 10/25/2017     Priority: Medium     Hx of breast cancer 10/25/2017     Priority: Medium     Major depressive disorder, recurrent episode, moderate (H) 04/07/2016     Priority: Medium     Anxiety 03/12/2016     Priority: Medium     Migraine without aura and without status migrainosus, not intractable 10/23/2015     Priority: Medium     Obesity, Class II, BMI 35-39.9 10/23/2015     Priority: Medium     Multiple sclerosis (H) 08/11/2015     Priority: Medium     CARDIOVASCULAR SCREENING; LDL GOAL LESS THAN 160 08/11/2015      Priority: Medium     Raynaud's syndrome 08/11/2015     Priority: Medium     Breast cancer (H) 08/11/2015     Priority: Medium     Age 44       Complication of anesthesia 08/11/2015     Priority: Medium     Arthritis 08/11/2015     Priority: Medium     Autoimmune disorder (H) 08/11/2015     Priority: Medium     Other insomnia 08/11/2015     Priority: Medium     Medication management contract agreement 08/11/2015     Priority: Medium     Ambien 12.5 mg, dispense 30 per month, follow up every 6 months        Neurogenic bladder 12/22/2014     Priority: Medium     Vision changes 12/22/2014     Priority: Medium     Demyelinating disorder (H) 12/22/2014     Priority: Medium     Weakness 11/20/2014     Priority: Medium     GERD (gastroesophageal reflux disease) 10/22/2014     Priority: Medium     History of breast cancer 10/22/2014     Priority: Medium     Overview:   stage 3, diagnosed in 2006 s/p double mastectomy, chemo and radiation.        Migraine 10/22/2014     Priority: Medium        Past Medical History:    Past Medical History:   Diagnosis Date     Breast cancer (H)      Cataract      Chemotherapy induced nausea and vomiting 12/15/2020     Common migraine      Esophageal reflux      H/O bilateral mastectomy      Mild major depression (H)      Multiple sclerosis (H)      Pulmonary embolism (H)        Past Surgical History:    Past Surgical History:   Procedure Laterality Date     CHOLECYSTECTOMY       ENDOBRONCHIAL ULTRASOUND FLEXIBLE N/A 09/05/2019    Procedure: Flexible Bronchoscopy, Endobronchial Ultrasound, Radial Probe;  Surgeon: Sree Sanchez MD;  Location: [facility] OR      REMOVAL OF OVARY/TUBE(S)       HERNIA REPAIR, UMBILICAL       mastectomy  Bilateral 2006     MASTECTOMY, BILATERAL       PHACOEMULSIFICATION CLEAR CORNEA WITH TORIC INTRAOCULAR LENS IMPLANT Left 2/8/2021    Procedure: PHACOEMULSIFICATION, COMPLEX CATARACT, WITH INTRAOCULAR LENS IMPLANT, RESIDENT TORIC LENS LEFT;  Surgeon: Filippo  Luis Macedo MD;  Location: [facility] OR     PHACOEMULSIFICATION CLEAR CORNEA WITH TORIC INTRAOCULAR LENS IMPLANT Right 3/8/2021    Procedure: PHACOEMULSIFICATION, CATARACT, WITH INTRAOCULAR LENS IMPLANT, TORIC LENS RIGHT;  Surgeon: Luis Barkley MD;  Location: [facility] OR       Family History:    Family History   Problem Relation Age of Onset     Breast Cancer Maternal Aunt 50         at 85     Breast Cancer Cousin 40     Breast Cancer Mother 49        2nd primary, contralateral breast at 69;  at 86     Melanoma Mother      Breast Cancer Maternal Grandmother 40     Ovarian Cancer Maternal Grandmother      Breast Cancer Paternal Grandmother 50         at 60     Brain Cancer Cousin 20     Breast Cancer Paternal Aunt 50         at 60     Breast Cancer Cousin 45        paternal cousin     Anesthesia Reaction No family hx of      Deep Vein Thrombosis No family hx of        Social History:  Marital Status:   [2]  Social History     Tobacco Use     Smoking status: Former     Types: Cigarettes     Quit date: 2005     Years since quittin.2     Smokeless tobacco: Never   Vaping Use     Vaping Use: Never used   Substance Use Topics     Alcohol use: Not Currently     Alcohol/week: 2.0 standard drinks     Types: 1 Glasses of wine, 1 Cans of beer per week     Drug use: No        Medications:    acetaminophen (TYLENOL) 500 MG tablet  Buprenorphine HCl (BELBUCA) 150 MCG FILM buccal film  Buprenorphine HCl (BELBUCA) 300 MCG FILM buccal film  busPIRone (BUSPAR) 15 MG tablet  calcium citrate-vitamin D (CITRACAL) 315-250 MG-UNIT TABS  Cholecalciferol (VITAMIN D3 PO)  ELIQUIS ANTICOAGULANT 5 MG tablet  erenumab-aooe (AIMOVIG) 140 MG/ML injection  LORazepam (ATIVAN) 1 MG tablet  morphine (MSIR) 15 MG IR tablet  naratriptan (AMERGE) 2.5 MG tablet  OLANZapine (ZYPREXA) 5 MG tablet  ondansetron (ZOFRAN ODT) 4 MG ODT tab  promethazine (PHENERGAN) 25 MG tablet  propranolol ER (INDERAL LA) 80 MG 24 hr  "capsule  traZODone (DESYREL) 50 MG tablet  ubrogepant (UBRELVY) 100 MG tablet  venlafaxine (EFFEXOR XR) 75 MG 24 hr capsule  vitamin B-2 (RIBOFLAVIN) 25 MG TABS tablet          Review of Systems   All other ROS reviewed and are negative or non-contributory except as stated in HPI.     Physical Exam   BP: (!) 140/98  Pulse: 70  Temp: 98.7  F (37.1  C)  Resp: 19  Height: 157.5 cm (5' 2\")  Weight: 59 kg (130 lb)  SpO2: 100 %      Physical Exam  Vitals and nursing note reviewed.   Constitutional:       Appearance: Normal appearance. She is normal weight.      Comments: Very pleasant, tired appearing female lying in the bed   HENT:      Head: Normocephalic.   Eyes:      Extraocular Movements: Extraocular movements intact.      Conjunctiva/sclera: Conjunctivae normal.   Cardiovascular:      Rate and Rhythm: Normal rate and regular rhythm.      Pulses: Normal pulses.   Pulmonary:      Effort: Pulmonary effort is normal.   Musculoskeletal:         General: Normal range of motion.      Cervical back: Normal range of motion and neck supple.      Right lower leg: No edema.      Left lower leg: No edema.   Skin:     General: Skin is warm and dry.      Coloration: Skin is not pale.   Neurological:      General: No focal deficit present.      Mental Status: She is alert and oriented to person, place, and time. Mental status is at baseline.   Psychiatric:         Mood and Affect: Mood normal.         Behavior: Behavior normal.         ED Course (with Medical Decision Making)    Pt seen and examined by me.  RN and EPIC notes reviewed.       Patient with significant cancer history presenting with headaches.  She does have history of migraines.  This headache came on faster than previous headaches and also has lasted longer without breaking.  Because of her cancer history I am going to do a CT scan of the head.  I do not have MRI available.  I am also going to access her port, check some labs and give her fluids and headache " protocol.    Patient has very mild elevation in her CRP at 5.75, normal ESR.  Electrolytes are basically within normal limits except for very slightly low sodium at 133.  Normal CBC except for platelets that are 90.    Head CT does not show any evidence of any acute process.  Chronic appearing changes noted below.    Patient was given IV fluids, Reglan, Benadryl, small dose of Toradol, dexamethasone, magnesium.    She had improvement of her symptomatology.  Her headache was down to a 3/10.  She was able to drink some fluids.  She was even feeling a little hunger.  She felt comfortable to return home.  I would like her to follow-up closely with her neurologist for her worsening symptoms despite significant migraine medications.  She may need another brain MRI.  In the meantime, if she has any significant worsening, changes or concerns return promptly at any time.   Procedures    Results for orders placed or performed during the hospital encounter of 03/12/23 (from the past 24 hour(s))   CRP inflammation   Result Value Ref Range    CRP Inflammation 5.75 (H) <5.00 mg/L   Erythrocyte sedimentation rate auto   Result Value Ref Range    Erythrocyte Sedimentation Rate 25 0 - 30 mm/hr   CBC with platelets differential    Narrative    The following orders were created for panel order CBC with platelets differential.  Procedure                               Abnormality         Status                     ---------                               -----------         ------                     CBC with platelets and d...[[id number]]  Abnormal            Final result                 Please view results for these tests on the individual orders.   Magnesium   Result Value Ref Range    Magnesium 2.0 1.7 - 2.3 mg/dL   Phosphorus   Result Value Ref Range    Phosphorus 2.8 2.5 - 4.5 mg/dL   Comprehensive metabolic panel   Result Value Ref Range    Sodium 133 (L) 136 - 145 mmol/L    Potassium 4.1 3.4 - 5.3 mmol/L    Chloride 98 98 - 107  mmol/L    Carbon Dioxide (CO2) 29 22 - 29 mmol/L    Anion Gap 6 (L) 7 - 15 mmol/L    Urea Nitrogen 10.9 8.0 - 23.0 mg/dL    Creatinine 0.57 0.51 - 0.95 mg/dL    Calcium 8.9 8.8 - 10.2 mg/dL    Glucose 98 70 - 99 mg/dL    Alkaline Phosphatase 116 (H) 35 - 104 U/L    AST 24 10 - 35 U/L    ALT 14 10 - 35 U/L    Protein Total 6.3 (L) 6.4 - 8.3 g/dL    Albumin 3.7 3.5 - 5.2 g/dL    Bilirubin Total 0.2 <=1.2 mg/dL    GFR Estimate >90 >60 mL/min/1.73m2   CBC with platelets and differential   Result Value Ref Range    WBC Count 5.4 4.0 - 11.0 10e3/uL    RBC Count 3.97 3.80 - 5.20 10e6/uL    Hemoglobin 11.8 11.7 - 15.7 g/dL    Hematocrit 35.4 35.0 - 47.0 %    MCV 89 78 - 100 fL    MCH 29.7 26.5 - 33.0 pg    MCHC 33.3 31.5 - 36.5 g/dL    RDW 14.1 10.0 - 15.0 %    Platelet Count 90 (L) 150 - 450 10e3/uL    % Neutrophils 78 %    % Lymphocytes 9 %    % Monocytes 6 %    % Eosinophils 2 %    % Basophils 1 %    % Immature Granulocytes 4 %    NRBCs per 100 WBC 0 <1 /100    Absolute Neutrophils 4.2 1.6 - 8.3 10e3/uL    Absolute Lymphocytes 0.5 (L) 0.8 - 5.3 10e3/uL    Absolute Monocytes 0.3 0.0 - 1.3 10e3/uL    Absolute Eosinophils 0.1 0.0 - 0.7 10e3/uL    Absolute Basophils 0.1 0.0 - 0.2 10e3/uL    Absolute Immature Granulocytes 0.2 <=0.4 10e3/uL    Absolute NRBCs 0.0 10e3/uL   CT Head w/o Contrast    Narrative    EXAM: CT HEAD W/O CONTRAST  LOCATION: [facility]  DATE/TIME: 3/12/2023 5:15 PM    INDICATION: Headache; Cancer tumor, known or r o; No new HA; Chronic or history of HA  COMPARISON: 06/28/2022  TECHNIQUE: Routine CT Head without IV contrast. Multiplanar reformats. Dose reduction techniques were used.    FINDINGS:  INTRACRANIAL CONTENTS: No intracranial hemorrhage, extraaxial collection, or mass effect.  Small infarctions basal ganglia bilaterally and left thalamus. While too small to be reliably characterized on CT, chronic infarctions in the respective locations   were present on the prior  MRI. Moderate presumed chronic small vessel ischemic changes. Mild generalized volume loss. No hydrocephalus.     VISUALIZED ORBITS/SINUSES/MASTOIDS: No intraorbital abnormality. No paranasal sinus mucosal disease. No middle ear or mastoid effusion.    BONES/SOFT TISSUES: No acute abnormality.      Impression    IMPRESSION:  1.  No acute intracranial process.     *Note: Due to a large number of results and/or encounters for the requested time period, some results have not been displayed. A complete set of results can be found in Results Review.       Medications   0.9% sodium chloride BOLUS (0 mLs Intravenous Stopped 3/12/23 1835)   metoclopramide (REGLAN) injection 10 mg (10 mg Intravenous $Given 3/12/23 1628)   dexamethasone PF (DECADRON) injection 10 mg (10 mg Intravenous $Given 3/12/23 1632)   diphenhydrAMINE (BENADRYL) injection 25 mg (25 mg Intravenous $Given 3/12/23 1624)   magnesium sulfate 1 g in 100 mL D5W intermittent infusion (0 g Intravenous Stopped 3/12/23 1719)   ketorolac (TORADOL) injection 15 mg (15 mg Intravenous $Given 3/12/23 1626)       Assessments & Plan      I have reviewed the findings, diagnosis, plan and need for follow up with the patient.    Discharge Medication List as of 3/12/2023  6:42 PM          Final diagnoses:   Migraine with status migrainosus, not intractable, unspecified migraine type     Disposition: Patient discharged home in stable condition.  Plan as above.  Return for concerns.     Note: Chart documentation done in part with Dragon Voice Recognition software. Although reviewed after completion, some word and grammatical errors may remain.     3/12/2023   [facility] EMERGENCY DEPT     Rani Chacon MD  03/13/23 0142

## 2023-03-13 NOTE — TELEPHONE ENCOUNTER
"Prior Authorization Retail Medication Request  DOSE INCREASE    Medication/Dose: Ubrelvy 100 mg  ICD code (if different than what is on RX):  Chronic migraine  Previously Tried and Failed:   DHE and toradol about 2-3 times per week and partially effective  Topiramate 200 mg BID and no side effects but word findings difficulties   for a while and was not effective and made patient \"forgetful\"  Protriptyline (Vivactil) outside provider and was not effective  Sumatriptan nasal and injections and all of the triptans (Maxalt, Relpax) were not effective  Botox injections    Was considered for Tysabri and 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  Venlafaxine helpful for anxiety/depression but not headaches  Naproxen as needed   Propranolol 60 mg ER -unsure of the effectiveness  Buspar for anxiety and headaches  Gabapentin-caused \"stomach sickness\"  Chlorpromazine -for nausea and has been helpful  Tizanidine -\"does not remember\"  bystolic  sumavel  Amitriptyline   Depakote  Verapamil  Petadolax 75 mg twice daily and was not effective  Physical therapy in the past and unsure of the effectiveness  Have not tried Cefaly  Aimovig  for a few month and stopped 3 years ago     Rationale:  Chronic migraine     Insurance Name:  [employer]  Insurance ID:  [id number]       Pharmacy Information (if different than what is on RX)  Name:    Phone:    "

## 2023-03-14 NOTE — TELEPHONE ENCOUNTER
[facility]: Palliative Care                                                                                        Received mychart message from patient requesting refill of belbuca.     Last refill: 2/13/2023  Last office visit: 3/8/2023  Scheduled for follow up 5/31/2023    Will route request to MD for review.     Reviewed [place]  Report.       Signature:  HAKAN HigginsN, RN, OCN

## 2023-03-15 NOTE — TELEPHONE ENCOUNTER
Central Prior Authorization Team   Phone: [phone]      PA Initiation    Medication: Ubrelvy 100 mg  Insurance Company: [employer] ([facility]) - Phone [phone] Fax [phone]  Pharmacy Filling the Rx: [facility] [address] [place] [id number] [facility]  Filling Pharmacy Phone: [phone]  Filling Pharmacy Fax:    Start Date: 3/15/2023

## 2023-03-16 NOTE — TELEPHONE ENCOUNTER
Prior Authorization Approval    Authorization Effective Date: 1/11/2023  Authorization Expiration Date: 1/11/2024  Medication: Ubrelvy 100 mg  Approved Dose/Quantity:   Reference #:     Insurance Company: [employer] ([facility]) - Phone [phone] Fax [phone]  Expected CoPay:       CoPay Card Available:      Foundation Assistance Needed:    Which Pharmacy is filling the prescription (Not needed for infusion/clinic administered): [facility] [address] [place] [id number] [facility]  Pharmacy Notified: Yes  Patient Notified: No

## 2023-03-17 DIAGNOSIS — G89.3 CANCER ASSOCIATED PAIN: ICD-10-CM

## 2023-03-17 DIAGNOSIS — M53.3 SACROILIAC JOINT PAIN: ICD-10-CM

## 2023-03-17 NOTE — TELEPHONE ENCOUNTER
Resending prescriptions for Belbuca 150 and 300 mcg films. Pharmacy did not receive the new orders sent on 3/15/23.    HAKAN NorthN, RN  Palliative Care Nurse Clinician    [phone] (Direct)  [phone] (Main)  [phone] (Appointment Scheduling)

## 2023-03-21 DIAGNOSIS — M53.3 SACROILIAC JOINT PAIN: ICD-10-CM

## 2023-03-27 ENCOUNTER — HOSPITAL ENCOUNTER (OUTPATIENT)
Dept: PET IMAGING | Facility: CLINIC | Age: 63
Setting detail: NUCLEAR MEDICINE
Discharge: HOME | End: 2023-03-27
Attending: INTERNAL MEDICINE | Admitting: INTERNAL MEDICINE
Payer: COMMERCIAL

## 2023-03-27 ENCOUNTER — MYC MEDICAL ADVICE (OUTPATIENT)
Dept: FAMILY MEDICINE | Facility: OTHER | Age: 63
End: 2023-03-27
Payer: COMMERCIAL

## 2023-03-27 DIAGNOSIS — C50.919 METASTATIC BREAST CANCER: ICD-10-CM

## 2023-03-27 DIAGNOSIS — C79.51 BONE METASTASES: ICD-10-CM

## 2023-03-27 DIAGNOSIS — G43.009 MIGRAINE WITHOUT AURA AND WITHOUT STATUS MIGRAINOSUS, NOT INTRACTABLE: ICD-10-CM

## 2023-03-27 PROCEDURE — 343N000001 HC RX 343: Performed by: INTERNAL MEDICINE

## 2023-03-27 PROCEDURE — 74177 CT ABD & PELVIS W/CONTRAST: CPT

## 2023-03-27 PROCEDURE — 250N000011 HC RX IP 250 OP 636: Performed by: INTERNAL MEDICINE

## 2023-03-27 PROCEDURE — A9552 F18 FDG: HCPCS | Performed by: INTERNAL MEDICINE

## 2023-03-27 PROCEDURE — 71260 CT THORAX DX C+: CPT | Mod: 26

## 2023-03-27 PROCEDURE — 74177 CT ABD & PELVIS W/CONTRAST: CPT | Mod: 26

## 2023-03-27 PROCEDURE — 78816 PET IMAGE W/CT FULL BODY: CPT | Mod: PS

## 2023-03-27 PROCEDURE — 78816 PET IMAGE W/CT FULL BODY: CPT | Mod: 26

## 2023-03-27 RX ORDER — IOPAMIDOL 755 MG/ML
0-135 INJECTION, SOLUTION INTRAVASCULAR ONCE
Status: COMPLETED | OUTPATIENT
Start: 2023-03-27 | End: 2023-03-27

## 2023-03-27 RX ORDER — PROPRANOLOL HYDROCHLORIDE 80 MG/1
CAPSULE, EXTENDED RELEASE ORAL
Qty: 90 CAPSULE | Refills: 3 | Status: SHIPPED | OUTPATIENT
Start: 2023-03-27 | End: 2023-01-01

## 2023-03-27 RX ADMIN — IOPAMIDOL 72 ML: 755 INJECTION, SOLUTION INTRAVENOUS at 15:23

## 2023-03-27 RX ADMIN — FLUDEOXYGLUCOSE F-18 10.09 MILLICURIE: 500 INJECTION, SOLUTION INTRAVENOUS at 14:19

## 2023-03-29 ENCOUNTER — ONCOLOGY VISIT (OUTPATIENT)
Dept: ONCOLOGY | Facility: CLINIC | Age: 63
End: 2023-03-29
Payer: COMMERCIAL

## 2023-03-29 VITALS
OXYGEN SATURATION: 100 % | HEIGHT: 62 IN | SYSTOLIC BLOOD PRESSURE: 121 MMHG | HEART RATE: 83 BPM | DIASTOLIC BLOOD PRESSURE: 88 MMHG | WEIGHT: 136 LBS | BODY MASS INDEX: 25.03 KG/M2

## 2023-03-29 DIAGNOSIS — Z51.81 ENCOUNTER FOR MONITORING CARDIOTOXIC DRUG THERAPY: Primary | ICD-10-CM

## 2023-03-29 DIAGNOSIS — C50.912 BILATERAL MALIGNANT NEOPLASM OF BREAST IN FEMALE, UNSPECIFIED ESTROGEN RECEPTOR STATUS, UNSPECIFIED SITE OF BREAST (H): ICD-10-CM

## 2023-03-29 DIAGNOSIS — E87.6 HYPOKALEMIA: ICD-10-CM

## 2023-03-29 DIAGNOSIS — T45.1X5A CHEMOTHERAPY-INDUCED NEUTROPENIA (H): ICD-10-CM

## 2023-03-29 DIAGNOSIS — Z79.899 ENCOUNTER FOR MONITORING CARDIOTOXIC DRUG THERAPY: Primary | ICD-10-CM

## 2023-03-29 DIAGNOSIS — C50.911 BILATERAL MALIGNANT NEOPLASM OF BREAST IN FEMALE, UNSPECIFIED ESTROGEN RECEPTOR STATUS, UNSPECIFIED SITE OF BREAST (H): ICD-10-CM

## 2023-03-29 DIAGNOSIS — C50.919 METASTATIC BREAST CANCER: ICD-10-CM

## 2023-03-29 DIAGNOSIS — C79.51 BONE METASTASES: ICD-10-CM

## 2023-03-29 DIAGNOSIS — D70.1 CHEMOTHERAPY-INDUCED NEUTROPENIA (H): ICD-10-CM

## 2023-03-29 PROCEDURE — 99215 OFFICE O/P EST HI 40 MIN: CPT | Performed by: INTERNAL MEDICINE

## 2023-03-29 RX ORDER — HEPARIN SODIUM,PORCINE 10 UNIT/ML
5 VIAL (ML) INTRAVENOUS
Status: CANCELLED | OUTPATIENT
Start: 2023-03-31

## 2023-03-29 RX ORDER — MEPERIDINE HYDROCHLORIDE 25 MG/ML
25 INJECTION INTRAMUSCULAR; INTRAVENOUS; SUBCUTANEOUS EVERY 30 MIN PRN
Status: CANCELLED | OUTPATIENT
Start: 2023-03-31

## 2023-03-29 RX ORDER — DIPHENHYDRAMINE HYDROCHLORIDE 50 MG/ML
50 INJECTION INTRAMUSCULAR; INTRAVENOUS
Status: CANCELLED
Start: 2023-03-31

## 2023-03-29 RX ORDER — ALBUTEROL SULFATE 0.83 MG/ML
2.5 SOLUTION RESPIRATORY (INHALATION)
Status: CANCELLED | OUTPATIENT
Start: 2023-03-31

## 2023-03-29 RX ORDER — HEPARIN SODIUM (PORCINE) LOCK FLUSH IV SOLN 100 UNIT/ML 100 UNIT/ML
5 SOLUTION INTRAVENOUS
Status: CANCELLED | OUTPATIENT
Start: 2023-03-31

## 2023-03-29 RX ORDER — ALBUTEROL SULFATE 90 UG/1
1-2 AEROSOL, METERED RESPIRATORY (INHALATION)
Status: CANCELLED
Start: 2023-03-31

## 2023-03-29 RX ORDER — EPINEPHRINE 1 MG/ML
0.3 INJECTION, SOLUTION INTRAMUSCULAR; SUBCUTANEOUS EVERY 5 MIN PRN
Status: CANCELLED | OUTPATIENT
Start: 2023-03-31

## 2023-03-29 RX ORDER — METHYLPREDNISOLONE SODIUM SUCCINATE 125 MG/2ML
125 INJECTION, POWDER, LYOPHILIZED, FOR SOLUTION INTRAMUSCULAR; INTRAVENOUS
Status: CANCELLED
Start: 2023-03-31

## 2023-03-29 RX ORDER — NALOXONE HYDROCHLORIDE 0.4 MG/ML
0.2 INJECTION, SOLUTION INTRAMUSCULAR; INTRAVENOUS; SUBCUTANEOUS
Status: CANCELLED | OUTPATIENT
Start: 2023-03-31

## 2023-03-29 ASSESSMENT — PAIN SCALES - GENERAL: PAINLEVEL: NO PAIN (0)

## 2023-03-29 NOTE — LETTER
3/29/2023         RE: Shaneka Alvarez  [address] [facility] [address]        Dear Colleague,    Thank you for referring your patient, Shaneka Alvarez, to the [facility]. Please see a copy of my visit note below.    ONCOLOGY FOLLOW UP NOTE: 3/29/23        I took the history from reviewing the previous notes that she was following with Dr. Amaya.  I have copied and updated from prior notes.    ONCOLOGY History:    1.  January 2006:  Diagnosed with Stage IIB, T2 N1 M0 invasive lobular carcinoma of the right breast.  Final pathology showed a 4.5 x 3.8 x 2.5 cm, 01/14 lymph nodes positive.  Estrogen, progesterone receptor positive, HER-2 negative.     2.  Genetics.  BRCA1 and 2 mutations not detected. 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       THERAPY TO DATE:   1. 2006:  ECOG 2104 protocol of dose-dense Adriamycin, Cytoxan and Avastin x4 cycles followed by Taxol and Avastin x4 cycles.   2.  03/2007:  Completed 1 year Avastin.   3.  11/2006-01/2007:  Radiotherapy to the right breast of 5040 cGy.   4.  03/[id number]:  Aromasin.  Stopped after moving to the [facility] [place].   5.  01/2016:  Right modified radical mastectomy with latissimus dorsi flap reconstruction and a left prophylactic mastectomy with latissimus dorsi flap reconstruction.     She recently had MRI of the brain as a follow-up for multiple sclerosis and there were multiple calvarial lesions noted which was suspicious for metastatic disease.  There was no evidence of new multiple sclerosis lesions.  She had a PET scan on 8/30/2019 which showed widespread bone metastatic lesions (calvarium, spine, sacrum, pelvis, ribs most prominent at C7, T3, L1 and L4), hypermetabolic 3 cm lesion in LLL, and mediastinal/hilar LN. CEA wnl at 1.9 and C27-29 elevated to 415 (28 on 8/23/16). A CT guided biopsy of the right iliac bone was obtained on 9/4/19, pathology consistent with metastatic adenocarcinoma (breast),  ER/NH negative, HER-2 negative PDL 1 < 1%. A second biopsy was take of the LLL nodule via EBUS on 9/5/19 did not show any malignancy.    C/T/L spine MRI 8/3/19 to 9/2/19 with numerous enhancing lesions of the C, T and L spine, largest around T9 and L1. No evidence of cord compression. Has a L1 compression fracture and impending L4.     Received radiation T12-L5 3000 cGy in 10 fractions from 9/6/2019 till 9/18/2019.  Neurosurgery recommended no surgical intervention but to wear Bradley brace when out of bed and HOB >30 degrees    She started palliative Xeloda 2000/1500 on 10/1/2019 but after just a few doses she noticed nausea, red eyes blurred vision, loss of appetite.  She stopped taking it after 5 doses and was seen by nurse practitioner on 10/7/2019 when she was improving.  At that point she was restarted on Xeloda at a lower dose of 1000 mg twice a day.  As she was not able to tolerate even the lower dose with extreme nausea and vomiting and feeling extremely fatigued and blurry vision, we decided on stopping Xeloda.    We decided on repeating scans and MRI brain on 10/25/2019 showed no intracranial metastatic disease.   Multiple enhancing calvarial lesions may be increased in number and are suggestive of metastatic disease. Thinner imaging on the current study may identify the smaller lesions and may be responsible for  identified more lesions.   There is a single focus of T2 hyperintense signal within the anterior right temporal lobe may represent interval demyelination. There is no evidence for active demyelination.    PET/CT on 11/1/2019 showed favorable response to therapy and Overall FDG uptake within scattered osseous metastases is decreased since 8/30/2019, particularly within the pelvis. Increased sclerotic appearance of L1 and L4 pathologic compression deformities, likely sequela of recently completed palliative radiation therapy.    No significant FDG uptake in previously demonstrated hypermetabolic  mediastinal lymph nodes. Biopsy negative on 9/5/2019.  There is also decreased FDG uptake in the left lower lobe (biopsy negative for  malignancy), now with dense consolidation containing air bronchograms suggestive of infection versus aspiration.  There is diffuse FDG uptake within the esophagus, consistent with esophagitis.    Because of intolerance to Xeloda we decided on switching to weekly paclitaxel on 11/5/2019.    C#2 Taxol 12/4/19- started with 70mg/m2 dose reduction    C#3 Taxol 12/30/2019     PET/CT on 1/24/2020 showed progression of the disease in some of the bone lesions including increase in size and lytic lesion within the vertebral body of C4 measuring 1 cm as opposed to 0.6 cm previously and a new central soft tissue nodule with SUV max 4.1 when previously it was non-hypermetabolic.  There is also some progression noted in the right proximal femur and right iliac bone.  There is decreased metabolic uptake in the right lamina of T9 with SUV max 4.7 when previously it was 8.0.  Other lesions are stable.    CA 27-29 also had increased significantly and it was 257 on 1/28/2020.    We decided on switching to eribulin on 1/28/2020.    C#2 2/18/2020  C#2 D#8 2/25/2020    C#3 D#1 3/18/2020 -delayed by 1 week as per patient's preference because she wanted to visit her family.    She had a repeat PET/CT on 3/27/2020 which showed stable size of the bone metastatic lesions although the FDG avidity was less, consistent with treatment response.    She had MRI of the thoracic spine on 4/3/2020 and it was compared to the one which was done in August 2019 and it showed increased size of several metastatic lesions most notably at T9 but also at T5-T7.  Other lesions at T10, T11 and T12 appeared improved.    CA 27-29 was also down to 222 on 3/18/2020.    Cycle #4 eribulin ( dose reduced to 1.2 mg/m2 )-4/7/2020-   Cycle #5 Eribulin ( 1.2 mg/m2 )- 4/28/2020   Cycle #6 Eribulin ( 1.2 mg/m2 )- 5/19/2020     Cycle #7  Eribulin ( 1.2 mg/m2 )- 6/9/2020    Cycle #8 Eribulin ( 1.2 mg/m2 )- 6/30/2020     She had a repeat PET scan on 7/17/2020 which showed incidental finding of new acute bilateral pulmonary embolism in the distal left main pulmonary artery extending in the left upper and lower lobes and in the segmental and branch arteries in the right lower lobe.    It also showed mildly increased FDG avidity in the lytic lesion in the T9 lamina and right pedicle with SUV max 5.3, previously 3.9.  That is also slightly increased FDG uptake to the left anterior fifth rib at the costochondral junction SUV max 3.9, previously 2.5.  There is slightly decreased FDG uptake in the right femoral neck lesion SUV max 2.2, previously 2.9.  FDG uptake in other bone lesions is fairly stable.  No new metastasis are seen.    CA 27-29 was 308 on 6/30/2020.  It was 286 on 5/19/2020.    Overall this is consistent with only slight progression versus stable disease.    She was started on Lovenox.    Cycle #9 eribulin 7/21/2020. CA 27-29 was 238    C#10 eribulin 8/18/2020. ( delayed by one week for ANC 0.9 )    C#11 Eribulin 9/8/2020    C#12 Eribulin 9/29/2020     C#13 Eribulin 10/20/2020     PET scan on 11/6/2020 shows progression of the disease with increased FDG uptake in the lytic lesions in the T9/T10 and right posterolateral elements as well as increased FDG uptake in the previously known hypermetabolic right iliac bone lesion suggesting recurrence of previously treated metastasis.  There is new subtle lesion in the right manubrium.  There is also a new fracture in the anterolateral left fourth rib with associated uptake and this could be a pathologic fracture.  Healing fracture in the left anterior fifth rib lesion.  There is resolution of the left pulmonary emboli.  Decreased FDG uptake of the esophagus consistent with improving inflammation.    CA 27-29 on 10/20/2020 was also elevated at 489.    C#1 Trodelvy on 11/11/2020.  Cycle #2 Trodelvy  12/2/2020  Cycle number 2-day #8 Trodelvy 12/8/2020.    12/15/2020-12/16/2020.  She was admitted to the hospital with nausea vomiting and dehydration.  She had tachycardia and initial lactic acid of 5.  It decreased to 1.4 after 2 L of normal saline.  She also had hypokalemia and ANC was 1.3.  She was treated with IV fluids.  Procalcitonin was negative.  CTA of the chest was negative for pulmonary embolism or pneumonia.  No bacterial infection was documented.  Her medication which she was initially unable to tolerate at home, were restarted and she was discharged home once started to feel better.      We delayed the start of cycle number 3 x 1 week and decrease the dose of chemotherapy by 25%.  She also had neutropenia with ANC of 1.0.      She started cycle number 3-day #1 on 12/29/2020.  CA 27-29 was 392.    Cycle number 3-day #8 1/5/2021.    C#4 Trodelvy 1/20/2021- 75% dose    2/5/2021.  PET/CT overall shows a good response to treatment with improvement of previously hypermetabolic lesions in the spine and pelvis and stability of other bone meta stasis.  There is a single lytic lesion in the right iliac bone which demonstrates slight Yimi increased FDG uptake and has SUV max 4.7 while previously it was SUV max 3.4.  There was diffuse wall thickening of the esophagus with uptake in the esophagus in the fundus of the stomach and this could be seen with inflammation.    Trodelvy cycle #5 - 2/10/2021.  75% of the dose.  CA 27-29 was 337.    Trodelvy cycle #6 - 3/3/2021.  75% of the dose.  Trodelvy cycle #6, D#8 - 3/10/2021.  75% of the dose.    Trodelvy C#8, D#8 on 4/21/2021  CA 27-29 stable at 371 on 4/14/2021    Trodelvy, cycle #9- 5/5/2021        On 2/25/2020 when she had biopsy of the right acetabulum and it was consistent with metastatic lobular carcinoma.  Again it was Triple Negative.     On 3/18/2020 when she also met with Dr. Arelis Arshad who also advised testing for androgen receptor studies on the biopsy  specimen.  Tumor was diffusely androgen receptor positive.      5/26/2021-cycle #10 Trodelvy started    CA 27-29 was 545.    6/11/2021.  PET/CT shows mildly increased uptake in several bone lesions for example in the left anterior iliac crest, mid right iliac crest and left ischium.  Uptake in other bone lesions are similar to previously.    Because of minimal progression of the disease and she is tolerating chemotherapy very well, we decided on continuing the same chemotherapy.    6/16/2021-Trodelvy cycle #11    7/7/2021 -Trodelvy cycle #12    9/14/2021-Trodelvy-cycle #15, day #8.    9/8/2021-CA 27-29 was more elevated and it was 1176.    PET/CT on 9/24/2021 showed stable findings with no evidence of FDG avid metastatic disease within the body.  Left axillary lymph nodes are prominent and hypermetabolic and likely from recent vaccinations in the left deltoid muscle.  Healed bony metastasis seems stable.      Cycle #16, Trodelvy 9/28/2021.  Cycle #17, day #8 Trodelvy was on 10/26/2021.  Cycle #18, day #8 Trodelvy on 11/22/2021       She saw Dr. Mejia whom on 10/6/2021.  She was not considered eligible for Enfortumab trial.    Cycle #19, Trodelvy on 12/7/2021 12/21/2021.  PET/CT showed progression of bony metastatic disease.  There is increase hypermetabolism in both the right and left iliac bones and the sternum.  Other bone lesions are stable.    There is new scattered areas of groundglass throughout both the lungs and these findings are indeterminate but may represent an infectious etiology.  Interval resolution of left axillary FDG avid lymphadenopathy.    She was started on Casodex 150 mg daily on 1/6/2022.        She was admitted to the hospital from 3/17/2022-3/19/2022 for vomiting and diarrhea and  severe back pain.  I reviewed the discharge summary.  CT lumbar spine showed a stable severe chronic L1 pathologic compression fracture.  Stable mild compression deformity of superior endplate of L3 and  superior endplate of L4.  The upper margin of the L4 fracture extends slightly into the spinal canal without high-grade canal stenosis and this is also stable.  Nondisplaced fractures coursing through the L3 pedicles bilaterally were seen which were not clearly seen previously this could be acute and likely pathologic.  No extraspinal soft tissue abnormality.  Neurosurgery recommended conservative management.  Her pain was controlled and she was discharged home as she felt better with this.      3/31/2022-PET/CT shows progressive bone meta stasis with progressive FDG uptake in the following lesions. Left iliac crest lesion with max SUV of 7.2, previously 3.5. Right mid iliac crest lytic lesion shows maximum SUV of 7.8, previously 6.9.  T10 vertebral body mixed lytic and sclerotic lesion with an SUV of 7.1, previously not hypermetabolic. Thoracic vertebral bodies 8, 7, 5, and 4 also show hypermetabolic lesions were previously there was no hypermetabolism.  Some of the sclerotic osseous lesions are unchanged and do not show increased hypermetabolism compatible treated lesion such as a severe compression deformity at L1 as well as superior compression deformity at L4.  Medial right clavicle sclerotic lesion with SUV max of 4.8, previously not hypermetabolic. There is also right-sided sternal lesions.      4/14/2022--C#1- switch to single agent Doxil 50 mg per metered squared every 4 weeks.    5/13/2022-cycle #2- Doxil- dose reduced to 40 mg per metered square due to severe nausea/vomiting and migraines requiring IV fluids and IV antiemetics.    6/10/2022-cycle #3-Doxil 40 mg per metered square    7/1/2022- PET/CT shows resolution of the hypermetabolic skeletal metastasis.    7/6/2022-cycle #4-Doxil 40 mg per metered square  8/5/2022- cycle #5-Doxil  9/1/2022.  Cycle #6-Doxil  9/30/2022- cycle #7-Doxil    10/18/2022.  PET/CT does not show evidence of any FDG activity    11/2/2022-cycle #8-Doxil.  11/30/2022-cycle  #9-Doxil.  12/28/2022-cycle #10-Doxil    2/3/2023- C#11- Doxil (delayed by 1 week because neutrophil count was 0.9)-wanted to give her Onpro but at that time it was not approved by insurance.    3/3/2023.  Cycle #11 Doxil.  Given with Onpro.    Interval history.  She feels good.  Tolerating chemotherapy well.  She had some increase in migraine headaches so her medications were increased and now doing better.  Denies nausea vomiting diarrhea constipation.  Her back pain is well controlled with buprenorphine buccal film and she has not required as needed pain medications.  No new swellings.  No infections or shortness of breath.  Neuropathy is stable.  Energy is stable.      ECOG 1    ROS:  Rest of the comprehensive review of the system was negative.        I reviewed other history in epic as below.       PAST MEDICAL HISTORY:     1.  Breast cancer  2.  Multiple sclerosis.   3.  Depression.   4.  Migraines.   5.  Hypertension.   6.  Cholecystectomy and umbilical hernia repair.     SOCIAL HISTORY: She smoked for 5 years but quit many years ago.  Drinks alcohol socially.  She lives with her .  She is a teacher in middle school.       FAMILY HISTORY: She mentions that her mother had breast cancer at 49.  Both grandmothers had breast cancer but she is not sure of the age.  A couple of cousins have cancers.  Patient has 3 children who are healthy.    Current Outpatient Medications   Medication     acetaminophen (TYLENOL) 500 MG tablet     Buprenorphine HCl (BELBUCA) 300 MCG FILM buccal film     busPIRone (BUSPAR) 15 MG tablet     calcium citrate-vitamin D (CITRACAL) 315-250 MG-UNIT TABS     Cholecalciferol (VITAMIN D3 PO)     ELIQUIS ANTICOAGULANT 5 MG tablet     erenumab-aooe (AIMOVIG) 140 MG/ML injection     LORazepam (ATIVAN) 1 MG tablet     morphine (MSIR) 15 MG IR tablet     naratriptan (AMERGE) 2.5 MG tablet     OLANZapine (ZYPREXA) 5 MG tablet     ondansetron (ZOFRAN ODT) 4 MG ODT tab     promethazine  "(PHENERGAN) 25 MG tablet     propranolol ER (INDERAL LA) 80 MG 24 hr capsule     traZODone (DESYREL) 50 MG tablet     ubrogepant (UBRELVY) 100 MG tablet     venlafaxine (EFFEXOR XR) 75 MG 24 hr capsule     vitamin B-2 (RIBOFLAVIN) 25 MG TABS tablet     No current facility-administered medications for this visit.        Allergies   Allergen Reactions     Bactrim [Sulfamethoxazole W/Trimethoprim]      Internal bleeding     Copaxone [Glatiramer] Hives          PHYSICAL EXAMINATION:     /88 (BP Location: Left arm, Patient Position: Chair, Cuff Size: Adult Regular)   Pulse 83   Ht 1.575 m (5' 2\")   Wt 61.7 kg (136 lb)   SpO2 100%   BMI 24.87 kg/m    Wt Readings from Last 4 Encounters:   03/29/23 61.7 kg (136 lb)   03/12/23 59 kg (130 lb)   03/03/23 64.1 kg (141 lb 6.4 oz)   03/01/23 63.6 kg (140 lb 3.2 oz)       CONSTITUTIONAL: No apparent distress  EYES: PERRLA, without pallor or jaundice  ENT/MOUTH: Ears unremarkable. No oral lesions  CVS: s1s2 normal  RESPIRATORY: Chest is clear  GI: Abdomen is benign  NEURO: Alert and oriented ×3  INTEGUMENT: no concerning skin rashes.  Back of the scalp lump is about the same size.  LYMPHATIC: no palpable lymphadenopathy  MUSCULOSKELETAL: Unremarkable. No bony tenderness.   EXTREMITIES: no pedal edema  PSYCH: Mentation, mood and affect are appropriate      Labs and Imaging.    Reviewed.    3/12/2023  CBC shows WBC 5.4.  Hemoglobin 11.8.  Platelets 90.  ANC 4.2.    ESR 25.  CRP 5.75.  Chemistry shows sodium 133.  Alkaline phosphatase 116.      CA 15-3 was 123 on 3/3/2023.      11/30/2022     CA 15-3 was 135.    9/30/2022  CA 15-3 was 159.  CA 27-29 was 1992 on 6/10/2022  CA 27-29 was 3370 on 5/13/2022.  It was 5307 on 4/14/2022 ferritin Doxil was started.      9/28/2021.      Iron studies, ferritin is 101, iron 51, TIBC 268 and iron saturation index 19%.    3/27/2023.  PET/CT  1. No new suspicious FDG uptake within the skeleton.  2. Stable numerous non-FDG avid lytic and " sclerotic osseous lesions  throughout the axial skeleton.  3. Stable nondisplaced fracture of the left acromion/glenoid, chronic  bilateral healing rib fractures, and compression deformities of the  thoracic and lumbar spine.  4. Hypermetabolic focus in the expected location of the AV node versus  mitral valve, this may represent a normal AV node versus mitral valve  pathology such as vegetations. Recommend follow-up with cardiac  Ultrasound.        11/30/2022-x-ray of the ribs of the left side  There are a few sclerotic lesion seen in the right anterior ribs that were seen on previous PET CTs. These appear to involve the fourth and fifth ribs. There are likely more subtle lesions that were   described on the PET CT scan. Irregularity of the distal end of the left ninth rib may be from metastatic disease or fracture on the oblique view which likely is chronic.       10/18/2022-PET/CT showed no new suspicious FDG uptake within the skeleton.  Stable abnormal FDG avid lytic and sclerotic osseous lesions.  Mild diffuse FDG uptake throughout the large bowel without CT abnormality.    7/1/2022.  PET/CT shows diffuse sclerotic and lytic osseous lesions without any abnormal FDG uptake in the skeleton, consistent with treated disease.  There is evaluation of previous hypermetabolic bone lesions.  Multiple chronic rib fracture deformities and stable compression fracture deformities of T6, L1 and L4.  No suspicious FDG uptake in the chest abdomen or pelvis.    3/31/2022-PET/CT shows progressive bone metastasis with progressive FDG uptake in the following lesions. Left iliac crest lesion with max SUV of 7.2, previously 3.5. Right mid iliac crest lytic lesion shows maximum SUV of 7.8, previously 6.9.  T10 vertebral body mixed lytic and sclerotic lesion with an SUV of 7.1, previously not hypermetabolic. Thoracic vertebral bodies 8, 7, 5, and 4 also show hypermetabolic lesions were previously there was no hypermetabolism.  Some of  the sclerotic osseous lesions are unchanged and do not show increased hypermetabolism compatible treated lesion such as a severe compression deformity at L1 as well as superior compression deformity at L4.  Medial right clavicle sclerotic lesion with SUV max of 4.8, previously not hypermetabolic. There is also right-sided sternal lesions.      Biopsy from the right acetabulum shows metastatic lobular carcinoma.  It is triple negative.  Androgen receptor was diffusely positive (90%, moderate intensity) by immunohistochemistry. )  PD-L1 negative.    Foundation one showed CDH1 mutation (initially lobular cancer), CCND1 amplification ( equivocal ). FGF3, FGF4, FGF19 amplification ( Equivocal ). Most promising is CCND1 amplification with abemaciclib showing response in 4/10 patients. FGF3,FGF4 and FGF19 are targetable with pan-FGFR inhibitors.           ASSESSMENT AND PLAN:     Metastatic breast cancer negative breast cancer (androgen receptor positive ) with extensive involvement of the bones.  There is also a left lower lobe hypermetabolic lesion and mediastinal lymph nodes which are hypermetabolic.  The biopsy from the right iliac bone is consistent with metastatic lobular breast cancer but it is hormone receptor and HER-2 negative and PDL 1 is also negative.    Foundation 1 testing did not reveal any actionable mutations.    She was intolerant to Xeloda and progressed on Taxol and eribulin.      We then switched to recently FDA approved sacituzumab govitecan-hziy (Trodelvy) for TNBC, based on the results of the phase II IMMU-132-01 clinical trial.   She started this on 11/11/2020.      She obtained a second opinion from Dr. Castillo from [facility] who recommended a repeat biopsy to be done to make sure that she really has triple negative breast cancer.      She also met with Dr. Arelis Arshad on 3/18/2021.      After 10 cycles of Trodelvy, she had PET/CT on 6/11/2021 which showed mildly increased uptake in  some of the bone lesions while stability in other bone lesions.        After 15 cycles, repeat PET scan showed stable findings.  CA 27-29 has been increasing and it is 1176.      As she was tolerating the chemotherapy well and her quality of life has been decent and scans were stable although she had a rising CA 27-29, we decided on continuing the same chemotherapy because it has been a very slow progression of the disease.    Then she had clear progression of the disease in the bones with increased FDG uptake in both right and left iliac bones and the sternum.    Foundation one showed CDH1 mutation (initially lobular cancer), CCND1 amplification ( equivocal ). FGF3, FGF4, FGF19 amplification ( Equivocal ). Most promising is CCND1 amplification with abemaciclib showing response in 4/10 patients. FGF3,FGF4 and FGF19 are targetable with pan-FGFR inhibitor    As the tumor is diffusely positive for androgen receptor, we decided to start her on androgen receptor blockers.    I initially recommended enzalutamide 160 mg daily ( Enzalutamide for the Treatment of Androgen Receptor-Expressing Triple-Negative Breast Cancer----J Clin Oncol. 2018 Mar 20; 36(9): 884-890. ).    Eventually we decided to start her on Casodex 150 mg daily instead of Enzalutamide due to cost issues.  Clinical Trial Clin Cancer Res. 2013 Oct 1;19(19):5507-12.   Phase II trial of bicalutamide in patients with androgen receptor-positive, estrogen receptor-negative metastatic Breast Cancer  She started Casodex on 1/6/2022.    She had progressive disease in the bones on the PET scan on 3/31/2022.    We changed to single agent Doxil on 4/14/2022.      She developed significant nausea vomiting and headache so cycle #2 was given on 5/13/2022 with 20% dose reduction which she tolerated well.    Cycle #3 was given on 6/10/2022 with the same dose reduced Doxil.    Repeat PET/CT on 7/1/2022 shows resolution of the FDG avid bony metastasis consistent with treated  disease.  No suspicious FDG uptake was seen in the chest abdomen and pelvis.  CA 27-29 was also coming down.     Repeat PET/CT in October 2022 after completing 7 cycles continued to show normal FDG uptake.  CA 15-3 was trending down     Cycle #11 was delayed by 1 week because of chemotherapy-induced neutropenia.  It was received on 2/3/2023.      She received cycle #12 on 3/3/2023 with Onpro.    Repeat PET/CT on 3/27/2023 overall showed very stable findings.    She is tolerating chemotherapy really well.    We will continue with cycle #13 today.    Chemotherapy-induced neutropenia.  Because of chemotherapy-induced neutropenia, we are giving her Onpro.      Focal FDG avidity in the heart.  This is in an expected location of AV node or mitral valve.  This is nonspecific.  We will repeat echocardiogram.      Baseline echocardiogram on 4/7/2022 was unremarkable with EF 60 to 65%.    Bone disease.  She has metastatic disease to the bone.  Previously she got palliative radiation to T12-L5 3000 cGy in 10 fractions from 9/6/2019 till 9/18/2019.  She was evaluated by orthopedics Dr. Bipin Elliott on 11/1/2019 and conservative management was recommended.    She was on Zometa every 3 months. She last received on 9/29/2020.  Because of progression of the disease in the bones, we switched to Xgeva which she received on 11/11/2020.    She had progression of the disease in the bones so we switched to Doxil but we continued xgeva.  PET scan on 7/1/2022 does not show any suspicious FDG uptake in the bones consistent with treated disease.  Repeat PET/CT on 10/18/2022 also does not show any FDG avid lesions in the skeleton.  Her pain is overall stable and well-controlled.  Buprenorphine buccal film.  She hardly requires as needed morphine.  Repeat PET scan shows stable findings.  Continue Xgeva calcium and vitamin D.       Pain management.  Doing well with buprenorphine buccal film and she has as needed morphine which she has not  used recently.  Follow-up with palliative care.       Migraines.  Recently dose of monthly Aimovig injections was increased.  Dose of as needed Amerge was also increased.  Now doing better.  Following with neurology.      Bilateral pulmonary emboli.  Continue indefinite Eliquis     We did not address the following today.      Shingles.  The rash has dried out.  She completed well with acyclovir and prednisone.  She takes gabapentin for postherpetic neuralgia and is doing better.        Constipation. Continue senna.    Neuropathy.  This remains mild and stable with neuropathy in her hands.    This is in addition to the chronic balance issues and heat and cold sensitivity from underlying multiple sclerosis which is also stable for years.  Continue to monitor.     Subcutaneous nodule in the scalp.  This looks like an epidermoid cyst.  She thinks it is a stable.  Continue to monitor.       Insomnia.  Continue trazodone.      Wall thickening of esophagus and FDG uptake of fundus of the stomach.  It could be in the setting of inflammation from recent nausea and vomiting.  Symptoms have resolved.  Continue to monitor clinically.    Vision changes.    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  It was thought that this could be due to Taxol as patient reported to the ophthalmologist that she was on Taxol in September 2019 when her symptoms started.  But she was not on Taxol in September 2019 when she started noticing the visual changes so Taxol is not responsible for this.  The first dose of Taxol was on 11/5/2019.   She had left cataract extraction on 2/8/2021 and she has noticed significant improvement in her vision.       Increased FDG ability in the colon.  She had FDG uptake in the cecum and ascending colon but no corresponding lesion was seen on the CT scan.  She is completely asymptomatic so at this time we will continue to observe.    Discussion regarding healthcare directives.   On previous visit she told me that she will bring the health care directive for our records but she forgot so I told her to bring it on next visit.      Breast implant removal.  She tells me that she was planning to do breast implant removal because there was a recall on this.  Her surgery was scheduled for 9/24/2019.  Because of this new development of metastatic breast cancer and her recent radiation, surgery has been canceled.  We discussed that at this time treatment for metastatic breast cancer would take precedence over the other surgery as the other surgery would require her to be off chemotherapy for several weeks and in that case the chances of cancer progression would be high and I would not recommend that.    Multiple sclerosis.  She will continue to follow with her neurologist Dr. Quiles.  Currently multiple sclerosis is under control and currently she is not taking any medications.    See me back in 4 weeks.    All of her questions were answered to her satisfaction.  She is agreeable and comfortable with the plan.    Dewayne Zepeda MD              Again, thank you for allowing me to participate in the care of your patient.        Sincerely,        Dewayne Zepeda MD

## 2023-03-29 NOTE — NURSING NOTE
"Oncology Rooming Note    March 29, 2023 4:06 PM   Shaneka Alvarez is a 62 year old female who presents for:    Chief Complaint   Patient presents with     Oncology Clinic Visit     Prior to tx     Initial Vitals: /88 (BP Location: Left arm, Patient Position: Chair, Cuff Size: Adult Regular)   Pulse 83   Ht 1.575 m (5' 2\")   Wt 61.7 kg (136 lb)   SpO2 100%   BMI 24.87 kg/m   Estimated body mass index is 24.87 kg/m  as calculated from the following:    Height as of this encounter: 1.575 m (5' 2\").    Weight as of this encounter: 61.7 kg (136 lb). Body surface area is 1.64 meters squared.  No Pain (0) Comment: Data Unavailable   No LMP recorded. Patient is postmenopausal.  Allergies reviewed: Yes  Medications reviewed: Yes    Medications: Medication refills not needed today.  Pharmacy name entered into [facility]:    [facility] - [address] [facility] [address] [place] [id number] [facility] [address] [place] [facility] [place] - [place] [id number]    Clinical concerns: NO Dr. Zepeda was notified.      Eliza Thakur CMA              "

## 2023-03-29 NOTE — PROGRESS NOTES
ONCOLOGY FOLLOW UP NOTE: 3/29/23        I took the history from reviewing the previous notes that she was following with Dr. Amaya.  I have copied and updated from prior notes.    ONCOLOGY History:    1.  January 2006:  Diagnosed with Stage IIB, T2 N1 M0 invasive lobular carcinoma of the right breast.  Final pathology showed a 4.5 x 3.8 x 2.5 cm, 01/14 lymph nodes positive.  Estrogen, progesterone receptor positive, HER-2 negative.     2.  Genetics.  BRCA1 and 2 mutations not detected. 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       THERAPY TO DATE:   1.  2006:  ECOG 2104 protocol of dose-dense Adriamycin, Cytoxan and Avastin x4 cycles followed by Taxol and Avastin x4 cycles.   2.  03/2007:  Completed 1 year Avastin.   3.  11/2006-01/2007:  Radiotherapy to the right breast of 5040 cGy.   4.  03/[id number]:  Aromasin.  Stopped after moving to the [facility].   5.  01/2016:  Right modified radical mastectomy with latissimus dorsi flap reconstruction and a left prophylactic mastectomy with latissimus dorsi flap reconstruction.     She recently had MRI of the brain as a follow-up for multiple sclerosis and there were multiple calvarial lesions noted which was suspicious for metastatic disease.  There was no evidence of new multiple sclerosis lesions.  She had a PET scan on 8/30/2019 which showed widespread bone metastatic lesions (calvarium, spine, sacrum, pelvis, ribs most prominent at C7, T3, L1 and L4), hypermetabolic 3 cm lesion in LLL, and mediastinal/hilar LN. CEA wnl at 1.9 and C27-29 elevated to 415 (28 on 8/23/16). A CT guided biopsy of the right iliac bone was obtained on 9/4/19, pathology consistent with metastatic adenocarcinoma (breast), ER/WV negative, HER-2 negative PDL 1 < 1%. A second biopsy was take of the LLL nodule via EBUS on 9/5/19 did not show any malignancy.    C/T/L spine MRI 8/3/19 to 9/2/19 with numerous enhancing lesions of the C, T and L spine, largest around T9 and L1. No evidence of  cord compression. Has a L1 compression fracture and impending L4.     Received radiation T12-L5 3000 cGy in 10 fractions from 9/6/2019 till 9/18/2019.  Neurosurgery recommended no surgical intervention but to wear Gorge brace when out of bed and HOB >30 degrees    She started palliative Xeloda 2000/1500 on 10/1/2019 but after just a few doses she noticed nausea, red eyes blurred vision, loss of appetite.  She stopped taking it after 5 doses and was seen by nurse practitioner on 10/7/2019 when she was improving.  At that point she was restarted on Xeloda at a lower dose of 1000 mg twice a day.  As she was not able to tolerate even the lower dose with extreme nausea and vomiting and feeling extremely fatigued and blurry vision, we decided on stopping Xeloda.    We decided on repeating scans and MRI brain on 10/25/2019 showed no intracranial metastatic disease.   Multiple enhancing calvarial lesions may be increased in number and are suggestive of metastatic disease. Thinner imaging on the current study may identify the smaller lesions and may be responsible for  identified more lesions.   There is a single focus of T2 hyperintense signal within the anterior right temporal lobe may represent interval demyelination. There is no evidence for active demyelination.    PET/CT on 11/1/2019 showed favorable response to therapy and Overall FDG uptake within scattered osseous metastases is decreased since 8/30/2019, particularly within the pelvis. Increased sclerotic appearance of L1 and L4 pathologic compression deformities, likely sequela of recently completed palliative radiation therapy.    No significant FDG uptake in previously demonstrated hypermetabolic mediastinal lymph nodes. Biopsy negative on 9/5/2019.  There is also decreased FDG uptake in the left lower lobe (biopsy negative for  malignancy), now with dense consolidation containing air bronchograms suggestive of infection versus aspiration.  There is diffuse FDG  uptake within the esophagus, consistent with esophagitis.    Because of intolerance to Xeloda we decided on switching to weekly paclitaxel on 11/5/2019.    C#2 Taxol 12/4/19- started with 70mg/m2 dose reduction    C#3 Taxol 12/30/2019     PET/CT on 1/24/2020 showed progression of the disease in some of the bone lesions including increase in size and lytic lesion within the vertebral body of C4 measuring 1 cm as opposed to 0.6 cm previously and a new central soft tissue nodule with SUV max 4.1 when previously it was non-hypermetabolic.  There is also some progression noted in the right proximal femur and right iliac bone.  There is decreased metabolic uptake in the right lamina of T9 with SUV max 4.7 when previously it was 8.0.  Other lesions are stable.    CA 27-29 also had increased significantly and it was 257 on 1/28/2020.    We decided on switching to eribulin on 1/28/2020.    C#2 2/18/2020  C#2 D#8 2/25/2020    C#3 D#1 3/18/2020 -delayed by 1 week as per patient's preference because she wanted to visit her family.    She had a repeat PET/CT on 3/27/2020 which showed stable size of the bone metastatic lesions although the FDG avidity was less, consistent with treatment response.    She had MRI of the thoracic spine on 4/3/2020 and it was compared to the one which was done in August 2019 and it showed increased size of several metastatic lesions most notably at T9 but also at T5-T7.  Other lesions at T10, T11 and T12 appeared improved.    CA 27-29 was also down to 222 on 3/18/2020.    Cycle #4 eribulin ( dose reduced to 1.2 mg/m2 )-4/7/2020-   Cycle #5 Eribulin ( 1.2 mg/m2 )- 4/28/2020   Cycle #6 Eribulin ( 1.2 mg/m2 )- 5/19/2020     Cycle #7 Eribulin ( 1.2 mg/m2 )- 6/9/2020    Cycle #8 Eribulin ( 1.2 mg/m2 )- 6/30/2020     She had a repeat PET scan on 7/17/2020 which showed incidental finding of new acute bilateral pulmonary embolism in the distal left main pulmonary artery extending in the left upper and lower  lobes and in the segmental and branch arteries in the right lower lobe.    It also showed mildly increased FDG avidity in the lytic lesion in the T9 lamina and right pedicle with SUV max 5.3, previously 3.9.  That is also slightly increased FDG uptake to the left anterior fifth rib at the costochondral junction SUV max 3.9, previously 2.5.  There is slightly decreased FDG uptake in the right femoral neck lesion SUV max 2.2, previously 2.9.  FDG uptake in other bone lesions is fairly stable.  No new metastasis are seen.    CA 27-29 was 308 on 6/30/2020.  It was 286 on 5/19/2020.    Overall this is consistent with only slight progression versus stable disease.    She was started on Lovenox.    Cycle #9 eribulin 7/21/2020. CA 27-29 was 238    C#10 eribulin 8/18/2020. ( delayed by one week for ANC 0.9 )    C#11 Eribulin 9/8/2020    C#12 Eribulin 9/29/2020     C#13 Eribulin 10/20/2020     PET scan on 11/6/2020 shows progression of the disease with increased FDG uptake in the lytic lesions in the T9/T10 and right posterolateral elements as well as increased FDG uptake in the previously known hypermetabolic right iliac bone lesion suggesting recurrence of previously treated metastasis.  There is new subtle lesion in the right manubrium.  There is also a new fracture in the anterolateral left fourth rib with associated uptake and this could be a pathologic fracture.  Healing fracture in the left anterior fifth rib lesion.  There is resolution of the left pulmonary emboli.  Decreased FDG uptake of the esophagus consistent with improving inflammation.    CA 27-29 on 10/20/2020 was also elevated at 489.    C#1 Trodelvy on 11/11/2020.  Cycle #2 Trodelvy 12/2/2020  Cycle number 2-day #8 Trodelvy 12/8/2020.    12/15/2020-12/16/2020.  She was admitted to the hospital with nausea vomiting and dehydration.  She had tachycardia and initial lactic acid of 5.  It decreased to 1.4 after 2 L of normal saline.  She also had hypokalemia  and ANC was 1.3.  She was treated with IV fluids.  Procalcitonin was negative.  CTA of the chest was negative for pulmonary embolism or pneumonia.  No bacterial infection was documented.  Her medication which she was initially unable to tolerate at home, were restarted and she was discharged home once started to feel better.      We delayed the start of cycle number 3 x 1 week and decrease the dose of chemotherapy by 25%.  She also had neutropenia with ANC of 1.0.      She started cycle number 3-day #1 on 12/29/2020.  CA 27-29 was 392.    Cycle number 3-day #8 1/5/2021.    C#4 Trodelvy 1/20/2021- 75% dose    2/5/2021.  PET/CT overall shows a good response to treatment with improvement of previously hypermetabolic lesions in the spine and pelvis and stability of other bone meta stasis.  There is a single lytic lesion in the right iliac bone which demonstrates slight Yimi increased FDG uptake and has SUV max 4.7 while previously it was SUV max 3.4.  There was diffuse wall thickening of the esophagus with uptake in the esophagus in the fundus of the stomach and this could be seen with inflammation.    Trodelvy cycle #5 - 2/10/2021.  75% of the dose.  CA 27-29 was 337.    Trodelvy cycle #6 - 3/3/2021.  75% of the dose.  Trodelvy cycle #6, D#8 - 3/10/2021.  75% of the dose.    Trodelvy C#8, D#8 on 4/21/2021  CA 27-29 stable at 371 on 4/14/2021    Trodelvy, cycle #9- 5/5/2021        On 2/25/2020 when she had biopsy of the right acetabulum and it was consistent with metastatic lobular carcinoma.  Again it was Triple Negative.     On 3/18/2020 when she also met with Dr. Arelis Arshad who also advised testing for androgen receptor studies on the biopsy specimen.  Tumor was diffusely androgen receptor positive.      5/26/2021-cycle #10 Trodelvy started    CA 27-29 was 545.    6/11/2021.  PET/CT shows mildly increased uptake in several bone lesions for example in the left anterior iliac crest, mid right iliac crest and left  ischium.  Uptake in other bone lesions are similar to previously.    Because of minimal progression of the disease and she is tolerating chemotherapy very well, we decided on continuing the same chemotherapy.    6/16/2021-Trodelvy cycle #11    7/7/2021 -Trodelvy cycle #12    9/14/2021-Trodelvy-cycle #15, day #8.    9/8/2021-CA 27-29 was more elevated and it was 1176.    PET/CT on 9/24/2021 showed stable findings with no evidence of FDG avid metastatic disease within the body.  Left axillary lymph nodes are prominent and hypermetabolic and likely from recent vaccinations in the left deltoid muscle.  Healed bony metastasis seems stable.      Cycle #16, Trodelvy 9/28/2021.  Cycle #17, day #8 Trodelvy was on 10/26/2021.  Cycle #18, day #8 Trodelvy on 11/22/2021       She saw Dr. Mjeia whom on 10/6/2021.  She was not considered eligible for Enfortumab trial.    Cycle #19, Trodelvy on 12/7/2021 12/21/2021.  PET/CT showed progression of bony metastatic disease.  There is increase hypermetabolism in both the right and left iliac bones and the sternum.  Other bone lesions are stable.    There is new scattered areas of groundglass throughout both the lungs and these findings are indeterminate but may represent an infectious etiology.  Interval resolution of left axillary FDG avid lymphadenopathy.    She was started on Casodex 150 mg daily on 1/6/2022.        She was admitted to the hospital from 3/17/2022-3/19/2022 for vomiting and diarrhea and  severe back pain.  I reviewed the discharge summary.  CT lumbar spine showed a stable severe chronic L1 pathologic compression fracture.  Stable mild compression deformity of superior endplate of L3 and superior endplate of L4.  The upper margin of the L4 fracture extends slightly into the spinal canal without high-grade canal stenosis and this is also stable.  Nondisplaced fractures coursing through the L3 pedicles bilaterally were seen which were not clearly seen previously  this could be acute and likely pathologic.  No extraspinal soft tissue abnormality.  Neurosurgery recommended conservative management.  Her pain was controlled and she was discharged home as she felt better with this.      3/31/2022-PET/CT shows progressive bone meta stasis with progressive FDG uptake in the following lesions. Left iliac crest lesion with max SUV of 7.2, previously 3.5. Right mid iliac crest lytic lesion shows maximum SUV of 7.8, previously 6.9.  T10 vertebral body mixed lytic and sclerotic lesion with an SUV of 7.1, previously not hypermetabolic. Thoracic vertebral bodies 8, 7, 5, and 4 also show hypermetabolic lesions were previously there was no hypermetabolism.  Some of the sclerotic osseous lesions are unchanged and do not show increased hypermetabolism compatible treated lesion such as a severe compression deformity at L1 as well as superior compression deformity at L4.  Medial right clavicle sclerotic lesion with SUV max of 4.8, previously not hypermetabolic. There is also right-sided sternal lesions.      4/14/2022--C#1- switch to single agent Doxil 50 mg per metered squared every 4 weeks.    5/13/2022-cycle #2- Doxil- dose reduced to 40 mg per metered square due to severe nausea/vomiting and migraines requiring IV fluids and IV antiemetics.    6/10/2022-cycle #3-Doxil 40 mg per metered square    7/1/2022- PET/CT shows resolution of the hypermetabolic skeletal metastasis.    7/6/2022-cycle #4-Doxil 40 mg per metered square  8/5/2022- cycle #5-Doxil  9/1/2022.  Cycle #6-Doxil  9/30/2022- cycle #7-Doxil    10/18/2022.  PET/CT does not show evidence of any FDG activity    11/2/2022-cycle #8-Doxil.  11/30/2022-cycle #9-Doxil.  12/28/2022-cycle #10-Doxil    2/3/2023- C#11- Doxil (delayed by 1 week because neutrophil count was 0.9)-wanted to give her Onpro but at that time it was not approved by insurance.    3/3/2023.  Cycle #11 Doxil.  Given with Onpro.    Interval history.  She feels good.   Tolerating chemotherapy well.  She had some increase in migraine headaches so her medications were increased and now doing better.  Denies nausea vomiting diarrhea constipation.  Her back pain is well controlled with buprenorphine buccal film and she has not required as needed pain medications.  No new swellings.  No infections or shortness of breath.  Neuropathy is stable.  Energy is stable.      ECOG 1    ROS:  Rest of the comprehensive review of the system was negative.        I reviewed other history in epic as below.       PAST MEDICAL HISTORY:     1.  Breast cancer  2.  Multiple sclerosis.   3.  Depression.   4.  Migraines.   5.  Hypertension.   6.  Cholecystectomy and umbilical hernia repair.     SOCIAL HISTORY: She smoked for 5 years but quit many years ago.  Drinks alcohol socially.  She lives with her .  She is a teacher in middle school.       FAMILY HISTORY: She mentions that her mother had breast cancer at 49.  Both grandmothers had breast cancer but she is not sure of the age.  A couple of cousins have cancers.  Patient has 3 children who are healthy.    Current Outpatient Medications   Medication     acetaminophen (TYLENOL) 500 MG tablet     Buprenorphine HCl (BELBUCA) 300 MCG FILM buccal film     busPIRone (BUSPAR) 15 MG tablet     calcium citrate-vitamin D (CITRACAL) 315-250 MG-UNIT TABS     Cholecalciferol (VITAMIN D3 PO)     ELIQUIS ANTICOAGULANT 5 MG tablet     erenumab-aooe (AIMOVIG) 140 MG/ML injection     LORazepam (ATIVAN) 1 MG tablet     morphine (MSIR) 15 MG IR tablet     naratriptan (AMERGE) 2.5 MG tablet     OLANZapine (ZYPREXA) 5 MG tablet     ondansetron (ZOFRAN ODT) 4 MG ODT tab     promethazine (PHENERGAN) 25 MG tablet     propranolol ER (INDERAL LA) 80 MG 24 hr capsule     traZODone (DESYREL) 50 MG tablet     ubrogepant (UBRELVY) 100 MG tablet     venlafaxine (EFFEXOR XR) 75 MG 24 hr capsule     vitamin B-2 (RIBOFLAVIN) 25 MG TABS tablet     No current facility-administered  "medications for this visit.        Allergies   Allergen Reactions     Bactrim [Sulfamethoxazole W/Trimethoprim]      Internal bleeding     Copaxone [Glatiramer] Hives          PHYSICAL EXAMINATION:     /88 (BP Location: Left arm, Patient Position: Chair, Cuff Size: Adult Regular)   Pulse 83   Ht 1.575 m (5' 2\")   Wt 61.7 kg (136 lb)   SpO2 100%   BMI 24.87 kg/m    Wt Readings from Last 4 Encounters:   03/29/23 61.7 kg (136 lb)   03/12/23 59 kg (130 lb)   03/03/23 64.1 kg (141 lb 6.4 oz)   03/01/23 63.6 kg (140 lb 3.2 oz)       CONSTITUTIONAL: No apparent distress  EYES: PERRLA, without pallor or jaundice  ENT/MOUTH: Ears unremarkable. No oral lesions  CVS: s1s2 normal  RESPIRATORY: Chest is clear  GI: Abdomen is benign  NEURO: Alert and oriented ×3  INTEGUMENT: no concerning skin rashes.  Back of the scalp lump is about the same size.  LYMPHATIC: no palpable lymphadenopathy  MUSCULOSKELETAL: Unremarkable. No bony tenderness.   EXTREMITIES: no pedal edema  PSYCH: Mentation, mood and affect are appropriate      Labs and Imaging.    Reviewed.    3/12/2023  CBC shows WBC 5.4.  Hemoglobin 11.8.  Platelets 90.  ANC 4.2.    ESR 25.  CRP 5.75.  Chemistry shows sodium 133.  Alkaline phosphatase 116.      CA 15-3 was 123 on 3/3/2023.      11/30/2022     CA 15-3 was 135.    9/30/2022  CA 15-3 was 159.  CA 27-29 was 1992 on 6/10/2022  CA 27-29 was 3370 on 5/13/2022.  It was 5307 on 4/14/2022 ferritin Doxil was started.      9/28/2021.      Iron studies, ferritin is 101, iron 51, TIBC 268 and iron saturation index 19%.    3/27/2023.  PET/CT  1. No new suspicious FDG uptake within the skeleton.  2. Stable numerous non-FDG avid lytic and sclerotic osseous lesions  throughout the axial skeleton.  3. Stable nondisplaced fracture of the left acromion/glenoid, chronic  bilateral healing rib fractures, and compression deformities of the  thoracic and lumbar spine.  4. Hypermetabolic focus in the expected location of the AV " node versus  mitral valve, this may represent a normal AV node versus mitral valve  pathology such as vegetations. Recommend follow-up with cardiac  Ultrasound.        11/30/2022-x-ray of the ribs of the left side  There are a few sclerotic lesion seen in the right anterior ribs that were seen on previous PET CTs. These appear to involve the fourth and fifth ribs. There are likely more subtle lesions that were   described on the PET CT scan. Irregularity of the distal end of the left ninth rib may be from metastatic disease or fracture on the oblique view which likely is chronic.       10/18/2022-PET/CT showed no new suspicious FDG uptake within the skeleton.  Stable abnormal FDG avid lytic and sclerotic osseous lesions.  Mild diffuse FDG uptake throughout the large bowel without CT abnormality.    7/1/2022.  PET/CT shows diffuse sclerotic and lytic osseous lesions without any abnormal FDG uptake in the skeleton, consistent with treated disease.  There is evaluation of previous hypermetabolic bone lesions.  Multiple chronic rib fracture deformities and stable compression fracture deformities of T6, L1 and L4.  No suspicious FDG uptake in the chest abdomen or pelvis.    3/31/2022-PET/CT shows progressive bone metastasis with progressive FDG uptake in the following lesions. Left iliac crest lesion with max SUV of 7.2, previously 3.5. Right mid iliac crest lytic lesion shows maximum SUV of 7.8, previously 6.9.  T10 vertebral body mixed lytic and sclerotic lesion with an SUV of 7.1, previously not hypermetabolic. Thoracic vertebral bodies 8, 7, 5, and 4 also show hypermetabolic lesions were previously there was no hypermetabolism.  Some of the sclerotic osseous lesions are unchanged and do not show increased hypermetabolism compatible treated lesion such as a severe compression deformity at L1 as well as superior compression deformity at L4.  Medial right clavicle sclerotic lesion with SUV max of 4.8, previously not  hypermetabolic. There is also right-sided sternal lesions.      Biopsy from the right acetabulum shows metastatic lobular carcinoma.  It is triple negative.  Androgen receptor was diffusely positive (90%, moderate intensity) by immunohistochemistry. )  PD-L1 negative.    Foundation one showed CDH1 mutation (initially lobular cancer), CCND1 amplification ( equivocal ). FGF3, FGF4, FGF19 amplification ( Equivocal ). Most promising is CCND1 amplification with abemaciclib showing response in 4/10 patients. FGF3,FGF4 and FGF19 are targetable with pan-FGFR inhibitors.           ASSESSMENT AND PLAN:     Metastatic breast cancer negative breast cancer (androgen receptor positive ) with extensive involvement of the bones.  There is also a left lower lobe hypermetabolic lesion and mediastinal lymph nodes which are hypermetabolic.  The biopsy from the right iliac bone is consistent with metastatic lobular breast cancer but it is hormone receptor and HER-2 negative and PDL 1 is also negative.    Foundation 1 testing did not reveal any actionable mutations.    She was intolerant to Xeloda and progressed on Taxol and eribulin.      We then switched to recently FDA approved sacituzumab govitecan-hziy (Trodelvy) for TNBC, based on the results of the phase II IMMU-132-01 clinical trial.   She started this on 11/11/2020.      She obtained a second opinion from Dr. Castillo from [facility] who recommended a repeat biopsy to be done to make sure that she really has triple negative breast cancer.      She also met with Dr. Arelis Arshad on 3/18/2021.      After 10 cycles of Trodelvy, she had PET/CT on 6/11/2021 which showed mildly increased uptake in some of the bone lesions while stability in other bone lesions.        After 15 cycles, repeat PET scan showed stable findings.  CA 27-29 has been increasing and it is 1176.      As she was tolerating the chemotherapy well and her quality of life has been decent and scans were  stable although she had a rising CA 27-29, we decided on continuing the same chemotherapy because it has been a very slow progression of the disease.    Then she had clear progression of the disease in the bones with increased FDG uptake in both right and left iliac bones and the sternum.    Foundation one showed CDH1 mutation (initially lobular cancer), CCND1 amplification ( equivocal ). FGF3, FGF4, FGF19 amplification ( Equivocal ). Most promising is CCND1 amplification with abemaciclib showing response in 4/10 patients. FGF3,FGF4 and FGF19 are targetable with pan-FGFR inhibitor    As the tumor is diffusely positive for androgen receptor, we decided to start her on androgen receptor blockers.    I initially recommended enzalutamide 160 mg daily ( Enzalutamide for the Treatment of Androgen Receptor-Expressing Triple-Negative Breast Cancer----J Clin Oncol. 2018 Mar 20; 36(9): 884-890. ).    Eventually we decided to start her on Casodex 150 mg daily instead of Enzalutamide due to cost issues.  Clinical Trial Clin Cancer Res. 2013 Oct 1;19(19):5505-12.   Phase II trial of bicalutamide in patients with androgen receptor-positive, estrogen receptor-negative metastatic Breast Cancer  She started Casodex on 1/6/2022.    She had progressive disease in the bones on the PET scan on 3/31/2022.    We changed to single agent Doxil on 4/14/2022.      She developed significant nausea vomiting and headache so cycle #2 was given on 5/13/2022 with 20% dose reduction which she tolerated well.    Cycle #3 was given on 6/10/2022 with the same dose reduced Doxil.    Repeat PET/CT on 7/1/2022 shows resolution of the FDG avid bony metastasis consistent with treated disease.  No suspicious FDG uptake was seen in the chest abdomen and pelvis.  CA 27-29 was also coming down.     Repeat PET/CT in October 2022 after completing 7 cycles continued to show normal FDG uptake.  CA 15-3 was trending down     Cycle #11 was delayed by 1 week because  of chemotherapy-induced neutropenia.  It was received on 2/3/2023.      She received cycle #12 on 3/3/2023 with Onpro.    Repeat PET/CT on 3/27/2023 overall showed very stable findings.    She is tolerating chemotherapy really well.    We will continue with cycle #13 today.    Chemotherapy-induced neutropenia.  Because of chemotherapy-induced neutropenia, we are giving her Onpro.      Focal FDG avidity in the heart.  This is in an expected location of AV node or mitral valve.  This is nonspecific.  We will repeat echocardiogram.      Baseline echocardiogram on 4/7/2022 was unremarkable with EF 60 to 65%.    Bone disease.  She has metastatic disease to the bone.  Previously she got palliative radiation to T12-L5 3000 cGy in 10 fractions from 9/6/2019 till 9/18/2019.  She was evaluated by orthopedics Dr. Bipin Elliott on 11/1/2019 and conservative management was recommended.    She was on Zometa every 3 months. She last received on 9/29/2020.  Because of progression of the disease in the bones, we switched to Xgeva which she received on 11/11/2020.    She had progression of the disease in the bones so we switched to Doxil but we continued xgeva.  PET scan on 7/1/2022 does not show any suspicious FDG uptake in the bones consistent with treated disease.  Repeat PET/CT on 10/18/2022 also does not show any FDG avid lesions in the skeleton.  Her pain is overall stable and well-controlled.  Buprenorphine buccal film.  She hardly requires as needed morphine.  Repeat PET scan shows stable findings.  Continue Xgeva calcium and vitamin D.       Pain management.  Doing well with buprenorphine buccal film and she has as needed morphine which she has not used recently.  Follow-up with palliative care.       Migraines.  Recently dose of monthly Aimovig injections was increased.  Dose of as needed Amerge was also increased.  Now doing better.  Following with neurology.      Bilateral pulmonary emboli.  Continue indefinite  Eliquis     We did not address the following today.      Shingles.  The rash has dried out.  She completed well with acyclovir and prednisone.  She takes gabapentin for postherpetic neuralgia and is doing better.        Constipation. Continue senna.    Neuropathy.  This remains mild and stable with neuropathy in her hands.    This is in addition to the chronic balance issues and heat and cold sensitivity from underlying multiple sclerosis which is also stable for years.  Continue to monitor.     Subcutaneous nodule in the scalp.  This looks like an epidermoid cyst.  She thinks it is a stable.  Continue to monitor.       Insomnia.  Continue trazodone.      Wall thickening of esophagus and FDG uptake of fundus of the stomach.  It could be in the setting of inflammation from recent nausea and vomiting.  Symptoms have resolved.  Continue to monitor clinically.    Vision changes.    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  It was thought that this could be due to Taxol as patient reported to the ophthalmologist that she was on Taxol in September 2019 when her symptoms started.  But she was not on Taxol in September 2019 when she started noticing the visual changes so Taxol is not responsible for this.  The first dose of Taxol was on 11/5/2019.   She had left cataract extraction on 2/8/2021 and she has noticed significant improvement in her vision.       Increased FDG ability in the colon.  She had FDG uptake in the cecum and ascending colon but no corresponding lesion was seen on the CT scan.  She is completely asymptomatic so at this time we will continue to observe.    Discussion regarding healthcare directives.  On previous visit she told me that she will bring the health care directive for our records but she forgot so I told her to bring it on next visit.      Breast implant removal.  She tells me that she was planning to do breast implant removal because there was a recall on  this.  Her surgery was scheduled for 9/24/2019.  Because of this new development of metastatic breast cancer and her recent radiation, surgery has been canceled.  We discussed that at this time treatment for metastatic breast cancer would take precedence over the other surgery as the other surgery would require her to be off chemotherapy for several weeks and in that case the chances of cancer progression would be high and I would not recommend that.    Multiple sclerosis.  She will continue to follow with her neurologist Dr. Quiles.  Currently multiple sclerosis is under control and currently she is not taking any medications.    See me back in 4 weeks.    All of her questions were answered to her satisfaction.  She is agreeable and comfortable with the plan.    Dewayne Zepeda MD

## 2023-03-31 ENCOUNTER — INFUSION THERAPY VISIT (OUTPATIENT)
Dept: INFUSION THERAPY | Facility: CLINIC | Age: 63
End: 2023-03-31
Payer: COMMERCIAL

## 2023-03-31 ENCOUNTER — LAB (OUTPATIENT)
Dept: ONCOLOGY | Facility: CLINIC | Age: 63
End: 2023-03-31
Payer: COMMERCIAL

## 2023-03-31 VITALS
BODY MASS INDEX: 25.32 KG/M2 | HEART RATE: 70 BPM | HEIGHT: 62 IN | TEMPERATURE: 98.1 F | DIASTOLIC BLOOD PRESSURE: 82 MMHG | RESPIRATION RATE: 16 BRPM | SYSTOLIC BLOOD PRESSURE: 119 MMHG | OXYGEN SATURATION: 100 % | WEIGHT: 137.6 LBS

## 2023-03-31 DIAGNOSIS — C50.911 BILATERAL MALIGNANT NEOPLASM OF BREAST IN FEMALE, UNSPECIFIED ESTROGEN RECEPTOR STATUS, UNSPECIFIED SITE OF BREAST (H): ICD-10-CM

## 2023-03-31 DIAGNOSIS — C50.919 METASTATIC BREAST CANCER: ICD-10-CM

## 2023-03-31 DIAGNOSIS — T45.1X5A CHEMOTHERAPY-INDUCED NEUTROPENIA (H): ICD-10-CM

## 2023-03-31 DIAGNOSIS — D70.1 CHEMOTHERAPY-INDUCED NEUTROPENIA (H): ICD-10-CM

## 2023-03-31 DIAGNOSIS — C79.51 BONE METASTASES: ICD-10-CM

## 2023-03-31 DIAGNOSIS — C79.51 BONE METASTASES: Primary | ICD-10-CM

## 2023-03-31 DIAGNOSIS — E87.6 HYPOKALEMIA: ICD-10-CM

## 2023-03-31 DIAGNOSIS — C50.912 BILATERAL MALIGNANT NEOPLASM OF BREAST IN FEMALE, UNSPECIFIED ESTROGEN RECEPTOR STATUS, UNSPECIFIED SITE OF BREAST (H): ICD-10-CM

## 2023-03-31 DIAGNOSIS — E87.6 HYPOKALEMIA: Primary | ICD-10-CM

## 2023-03-31 LAB
ALBUMIN SERPL-MCNC: 3.3 G/DL (ref 3.4–5)
ALP SERPL-CCNC: 83 U/L (ref 40–150)
ALT SERPL W P-5'-P-CCNC: 19 U/L (ref 0–50)
ANION GAP SERPL CALCULATED.3IONS-SCNC: 3 MMOL/L (ref 3–14)
AST SERPL W P-5'-P-CCNC: 22 U/L (ref 0–45)
BASOPHILS # BLD AUTO: 0.1 10E3/UL (ref 0–0.2)
BASOPHILS NFR BLD AUTO: 2 %
BILIRUB SERPL-MCNC: 0.3 MG/DL (ref 0.2–1.3)
BUN SERPL-MCNC: 12 MG/DL (ref 7–30)
CALCIUM SERPL-MCNC: 8.7 MG/DL (ref 8.5–10.1)
CANCER AG15-3 SERPL-ACNC: 129 U/ML
CHLORIDE BLD-SCNC: 102 MMOL/L (ref 94–109)
CO2 SERPL-SCNC: 30 MMOL/L (ref 20–32)
CREAT SERPL-MCNC: 0.65 MG/DL (ref 0.52–1.04)
EOSINOPHIL # BLD AUTO: 0.1 10E3/UL (ref 0–0.7)
EOSINOPHIL NFR BLD AUTO: 3 %
ERYTHROCYTE [DISTWIDTH] IN BLOOD BY AUTOMATED COUNT: 14.5 % (ref 10–15)
GFR SERPL CREATININE-BSD FRML MDRD: >90 ML/MIN/1.73M2
GLUCOSE BLD-MCNC: 115 MG/DL (ref 70–99)
HCT VFR BLD AUTO: 35 % (ref 35–47)
HGB BLD-MCNC: 11.7 G/DL (ref 11.7–15.7)
IMM GRANULOCYTES # BLD: 0 10E3/UL
IMM GRANULOCYTES NFR BLD: 1 %
LYMPHOCYTES # BLD AUTO: 0.4 10E3/UL (ref 0.8–5.3)
LYMPHOCYTES NFR BLD AUTO: 8 %
MCH RBC QN AUTO: 29.9 PG (ref 26.5–33)
MCHC RBC AUTO-ENTMCNC: 33.4 G/DL (ref 31.5–36.5)
MCV RBC AUTO: 90 FL (ref 78–100)
MONOCYTES # BLD AUTO: 0.8 10E3/UL (ref 0–1.3)
MONOCYTES NFR BLD AUTO: 19 %
NEUTROPHILS # BLD AUTO: 3 10E3/UL (ref 1.6–8.3)
NEUTROPHILS NFR BLD AUTO: 67 %
NRBC # BLD AUTO: 0 10E3/UL
NRBC BLD AUTO-RTO: 0 /100
PLATELET # BLD AUTO: 194 10E3/UL (ref 150–450)
POTASSIUM BLD-SCNC: 4.3 MMOL/L (ref 3.4–5.3)
PROT SERPL-MCNC: 6.9 G/DL (ref 6.8–8.8)
RBC # BLD AUTO: 3.91 10E6/UL (ref 3.8–5.2)
SODIUM SERPL-SCNC: 135 MMOL/L (ref 133–144)
WBC # BLD AUTO: 4.4 10E3/UL (ref 4–11)

## 2023-03-31 PROCEDURE — 96367 TX/PROPH/DG ADDL SEQ IV INF: CPT | Performed by: INTERNAL MEDICINE

## 2023-03-31 PROCEDURE — 99207 PR NO CHARGE LOS: CPT

## 2023-03-31 PROCEDURE — 96413 CHEMO IV INFUSION 1 HR: CPT | Performed by: INTERNAL MEDICINE

## 2023-03-31 PROCEDURE — 80053 COMPREHEN METABOLIC PANEL: CPT | Performed by: INTERNAL MEDICINE

## 2023-03-31 PROCEDURE — 96377 APPLICATON ON-BODY INJECTOR: CPT | Mod: 59 | Performed by: INTERNAL MEDICINE

## 2023-03-31 PROCEDURE — 85025 COMPLETE CBC W/AUTO DIFF WBC: CPT | Performed by: INTERNAL MEDICINE

## 2023-03-31 PROCEDURE — 96372 THER/PROPH/DIAG INJ SC/IM: CPT | Mod: 59 | Performed by: INTERNAL MEDICINE

## 2023-03-31 PROCEDURE — 86300 IMMUNOASSAY TUMOR CA 15-3: CPT | Performed by: INTERNAL MEDICINE

## 2023-03-31 RX ORDER — HEPARIN SODIUM (PORCINE) LOCK FLUSH IV SOLN 100 UNIT/ML 100 UNIT/ML
5 SOLUTION INTRAVENOUS
Status: DISCONTINUED | OUTPATIENT
Start: 2023-03-31 | End: 2023-03-31 | Stop reason: HOSPADM

## 2023-03-31 RX ORDER — HEPARIN SODIUM (PORCINE) LOCK FLUSH IV SOLN 100 UNIT/ML 100 UNIT/ML
5 SOLUTION INTRAVENOUS ONCE
Status: COMPLETED | OUTPATIENT
Start: 2023-03-31 | End: 2023-03-31

## 2023-03-31 RX ADMIN — HEPARIN SODIUM (PORCINE) LOCK FLUSH IV SOLN 100 UNIT/ML 5 ML: 100 SOLUTION at 11:50

## 2023-03-31 RX ADMIN — HEPARIN SODIUM (PORCINE) LOCK FLUSH IV SOLN 100 UNIT/ML 5 ML: 100 SOLUTION at 09:08

## 2023-03-31 ASSESSMENT — PAIN SCALES - GENERAL: PAINLEVEL: MODERATE PAIN (4)

## 2023-03-31 NOTE — PROGRESS NOTES
Infusion Nursing Note:  Shaneka Alvarez presents today for C13D1 Doxil + Xgeva.    Patient seen by provider today: No   present during visit today: Not Applicable.    Note: Patient overall doing well, occasional nausea but utilizing anti-nausea medications. Expressed increased numbness/tingling to legs when she it tired. No other new medical concerns.    Intravenous Access:  Implanted Port.    Treatment Conditions:  Lab Results   Component Value Date    HGB 11.7 03/31/2023    WBC 4.4 03/31/2023    ANEU 1.3 (L) 03/03/2023    ANEUTAUTO 3.0 03/31/2023     03/31/2023      Lab Results   Component Value Date     03/31/2023    POTASSIUM 4.3 03/31/2023    MAG 2.0 03/12/2023    CR 0.65 03/31/2023    RAFAELA 8.7 03/31/2023    BILITOTAL 0.3 03/31/2023    ALBUMIN 3.3 (L) 03/31/2023    ALT 19 03/31/2023    AST 22 03/31/2023     Results reviewed, labs MET treatment parameters, ok to proceed with treatment.  ECHO/MUGA completed 4/7/22  EF 60-65%. - appointment scheduled for 4/20/23    ONPRO  Was placed on patient's: right side of abdomen.    Was placed at 11:40 AM    Podpal used: Yes    ONPRO injector device Lot number: [id number]    Patient education included: what patient can expect after application, what colored lights mean on the device, when to remove device, when and where to call with questions or issues, all patients questions answered and that Neulasta administration will occur at 2:45 PM on 4/1/23.    Patient tolerated administration well.      Post Infusion Assessment:  Patient tolerated infusion without incident.  Blood return noted pre and post infusion.  Site patent and intact, free from redness, edema or discomfort.  No evidence of extravasations.  Access discontinued per protocol.     Discharge Plan:   AVS to patient via MYCHART.  Patient will return 4/28/23 for next appointment.   Patient discharged in stable condition accompanied by: self.  Departure Mode: Ambulatory.      Halima Bullock,  RN

## 2023-03-31 NOTE — PROGRESS NOTES
"Patient's name and  were verified.  See Doc Flowsheet - IV assess for details.  IVAD accessed with 20G 3/4\" mcnair gripper plus needle  blood return positive: YES  Site without redness, tenderness or swelling: YES  flushed with 10cc NS and 5cc 100u/ml heparin  Needle: left in place for treatment  Comments: Labs drawn.  Patient tolerated procedure without incident.      "

## 2023-04-03 PROBLEM — C79.51 MALIGNANT NEOPLASM METASTATIC TO BONE (H): Status: ACTIVE | Noted: 2019-10-22

## 2023-04-03 PROBLEM — C50.919 PRIMARY MALIGNANT NEOPLASM OF BREAST WITH METASTASIS (H): Status: ACTIVE | Noted: 2019-09-18

## 2023-04-04 ENCOUNTER — MYC MEDICAL ADVICE (OUTPATIENT)
Dept: FAMILY MEDICINE | Facility: OTHER | Age: 63
End: 2023-04-04
Payer: COMMERCIAL

## 2023-04-04 DIAGNOSIS — F41.9 ANXIETY: ICD-10-CM

## 2023-04-04 RX ORDER — BUSPIRONE HYDROCHLORIDE 15 MG/1
15 TABLET ORAL 2 TIMES DAILY
Qty: 180 TABLET | Refills: 0 | Status: SHIPPED | OUTPATIENT
Start: 2023-04-04 | End: 2023-01-01

## 2023-04-05 ENCOUNTER — THERAPY VISIT (OUTPATIENT)
Dept: PHYSICAL THERAPY | Facility: CLINIC | Age: 63
End: 2023-04-05
Attending: HOSPITALIST
Payer: COMMERCIAL

## 2023-04-05 ENCOUNTER — MYC MEDICAL ADVICE (OUTPATIENT)
Dept: ONCOLOGY | Facility: CLINIC | Age: 63
End: 2023-04-05

## 2023-04-05 DIAGNOSIS — G89.29 CHRONIC BILATERAL LOW BACK PAIN WITHOUT SCIATICA: ICD-10-CM

## 2023-04-05 DIAGNOSIS — M53.3 SACROILIAC JOINT PAIN: ICD-10-CM

## 2023-04-05 DIAGNOSIS — M54.50 CHRONIC BILATERAL LOW BACK PAIN WITHOUT SCIATICA: ICD-10-CM

## 2023-04-05 DIAGNOSIS — M53.3 SI (SACROILIAC) JOINT DYSFUNCTION: ICD-10-CM

## 2023-04-05 PROBLEM — M54.42 LEFT-SIDED LOW BACK PAIN WITH LEFT-SIDED SCIATICA: Status: ACTIVE | Noted: 2023-04-05

## 2023-04-05 PROCEDURE — 97140 MANUAL THERAPY 1/> REGIONS: CPT | Mod: GP | Performed by: PHYSICAL THERAPIST

## 2023-04-05 PROCEDURE — 97162 PT EVAL MOD COMPLEX 30 MIN: CPT | Mod: GP | Performed by: PHYSICAL THERAPIST

## 2023-04-05 PROCEDURE — 97110 THERAPEUTIC EXERCISES: CPT | Mod: GP | Performed by: PHYSICAL THERAPIST

## 2023-04-05 NOTE — PROGRESS NOTES
Physical Therapy Initial Evaluation  Subjective:    Patient Health History  Shaneka Alvarez being seen for Spine evaluation.     Problem began: 8/30/2019.   Problem occurred: Bone cancer   Pain score: ranges 3-7/10.  General health as reported by patient is fair.  Pertinent medical history includes: cancer, history of fractures, migraines/headaches, multiple sclerosis and numbness/tingling.   Red flags:  None as reported by patient.  Medical allergies: adhesives.   Surgeries include:  Cancer surgery and other. Other surgery history details: Gallbladder, breast reconstruction x2.    Current medications:  Anti-depressants, anti-inflammatory, bone density, heparin/coumadin, high blood pressure medication, pain medication and sleep medication.    Current occupation is None currently, former teacher.   Primary job tasks include:  Driving, lifting/carrying, repetitive tasks and other.   Other job/home tasks details: Housework.                Therapist Generated HPI Evaluation         Type of problem:  Sacroiliac.    This is a chronic condition.  Condition occurred with:  Other reason (cancer).  Where condition occurred: for unknown reasons.  Patient reports pain:  SI joint left and SI joint right.  Pain is described as aching and is constant.  Pain timing: worse as day progresses.  Since onset symptoms are gradually worsening.  Symptoms are exacerbated by lifting (shopping is limited to 45 mins and standing limited to 10 mins)  and relieved by heat and bracing/immobilizing.  Special tests included:  MRI and x-ray.    Barriers include:  None as reported by patient.                        Objective:  Standing Alignment:        Lumbar:  Posterior pelvic tilt and lordosis decr  Pelvic:  Normal          Gait:    Gait Type:  Normal   Assistive Devices:  None    Non-Weight Bearing:    Pelvis:  ASIS high L and PSIS high R        Flexibility/Screens:   Positive screens:  SI Joint        Lower Extremity:  Normal              Lumbar/SI Evaluation  ROM:    AROM Lumbar:   Flexion:        Hands to mid-thigh with stretching but no increase pain   Ext:                    Contraindicated to cancer and osteoporosis    Side Bend:        Left:     Right:   Rotation:           Left:     Right:   Side Glide:        Left:     Right:           Lumbar Myotomes:  normal            Lumbar DTR's:  not assessed      Cord Signs:  not assessed    Lumbar Dermtomes:  not assessed                Neural Tension/Mobility:  Lumbar:  Normal        Lumbar Palpation:    Tenderness present at Left:    PSIS  Tenderness not present at Left:    Erector Spinae or Piriformis  Tenderness present at Right: PSIS  Tenderness not present at Right:  Erector Spinae or Piriformis  Functional Tests:  not assessed        Lumbar Provocation:    Left positive with:  PROM hip    Right negative with:  PROM hip    SI joint/Sacrum:          Left negative at:    Squish    Right negative at:  Squish                                                     General Evaluation:          Lower Extremity Flexibility:  normal                                                                             ROS    Assessment/Plan:    Patient is a 62 year old female with lumbar and sacral complaints.    Patient has the following significant findings with corresponding treatment plan.                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)  Pain -  manual therapy, self management, education and home program  Decreased strength - therapeutic exercise, therapeutic activities and home program  Impaired balance - neuro re-education, therapeutic activities and home program  Inflammation - self management/home program  Impaired muscle performance - neuro re-education and home program  Decreased function - therapeutic activities and home program  Impaired posture - neuro re-education, therapeutic activities and home program    Therapy Evaluation Codes:   1) History comprised of:   Personal  factors that impact the plan of care:      Time since onset of symptoms and Bone Cancer.    Comorbidity factors that impact the plan of care are:      Cancer, Migraines/headaches, Multiple sclerosis, Numbness/tingling and History of Fractures.     Medications impacting care: Anti-depressant, Anti-inflammatory, Bone density, High blood pressure, Heparin/coumadin, Pain, Sleep and Infusion Therapy.  2) Examination of Body Systems comprised of:   Body structures and functions that impact the plan of care:      Lumbar spine and Sacral illiac joint.   Activity limitations that impact the plan of care are:      Stairs, Standing and Walking.  3) Clinical presentation characteristics are:   Evolving/Changing.  4) Decision-Making    Moderate complexity using standardized patient assessment instrument and/or measureable assessment of functional outcome.  Cumulative Therapy Evaluation is: Moderate complexity.    Previous and current functional limitations:  (See Goal Flow Sheet for this information)    Short term and Long term goals: (See Goal Flow Sheet for this information)     Communication ability:  Patient appears to be able to clearly communicate and understand verbal and written communication and follow directions correctly.  Treatment Explanation - The following has been discussed with the patient:   RX ordered/plan of care  Anticipated outcomes  Possible risks and side effects  This patient would benefit from PT intervention to resume normal activities.   Rehab potential is fair.    Frequency:  1 X week, once daily  Duration:  for 12 weeks  Discharge Plan:  Achieve all LTG.  Independent in home treatment program.  Return to previous functional level by discharge.  Reach maximal therapeutic benefit.    Please refer to the daily flowsheet for treatment today, total treatment time and time spent performing 1:1 timed codes.

## 2023-04-06 NOTE — TELEPHONE ENCOUNTER
[facility]: Palliative Care                                                                                        Received mychart message from patient requesting refill of belbuca.     Last refill: 3/16/2023, historical change 3/21/2023 (dose increase)  Last office visit: 3/8/2023  Scheduled for follow up 5/31/2023     Will route request to MD for review.     Reviewed [place]  Report.       Signature:  HAKAN HigginsN, RN, OCN

## 2023-04-12 ENCOUNTER — HOSPITAL ENCOUNTER (EMERGENCY)
Facility: CLINIC | Age: 63
Discharge: HOME | End: 2023-04-12
Attending: EMERGENCY MEDICINE | Admitting: EMERGENCY MEDICINE
Payer: COMMERCIAL

## 2023-04-12 VITALS
HEART RATE: 102 BPM | DIASTOLIC BLOOD PRESSURE: 81 MMHG | TEMPERATURE: 98.4 F | OXYGEN SATURATION: 100 % | SYSTOLIC BLOOD PRESSURE: 116 MMHG | RESPIRATION RATE: 20 BRPM

## 2023-04-12 DIAGNOSIS — R51.9 SEVERE HEADACHE: ICD-10-CM

## 2023-04-12 DIAGNOSIS — C50.919 MALIGNANT NEOPLASM OF FEMALE BREAST, UNSPECIFIED ESTROGEN RECEPTOR STATUS, UNSPECIFIED LATERALITY, UNSPECIFIED SITE OF BREAST (H): ICD-10-CM

## 2023-04-12 LAB
ALBUMIN SERPL BCG-MCNC: 4.1 G/DL (ref 3.5–5.2)
ALBUMIN UR-MCNC: NEGATIVE MG/DL
ALP SERPL-CCNC: 120 U/L (ref 35–104)
ALT SERPL W P-5'-P-CCNC: 21 U/L (ref 10–35)
ANION GAP SERPL CALCULATED.3IONS-SCNC: 17 MMOL/L (ref 7–15)
APPEARANCE UR: ABNORMAL
AST SERPL W P-5'-P-CCNC: 33 U/L (ref 10–35)
BASOPHILS # BLD AUTO: 0 10E3/UL (ref 0–0.2)
BASOPHILS NFR BLD AUTO: 0 %
BILIRUB SERPL-MCNC: 0.8 MG/DL
BILIRUB UR QL STRIP: NEGATIVE
BUN SERPL-MCNC: 16 MG/DL (ref 8–23)
CALCIUM SERPL-MCNC: 9.7 MG/DL (ref 8.8–10.2)
CHLORIDE SERPL-SCNC: 85 MMOL/L (ref 98–107)
COLOR UR AUTO: YELLOW
CREAT SERPL-MCNC: 0.64 MG/DL (ref 0.51–0.95)
DEPRECATED HCO3 PLAS-SCNC: 28 MMOL/L (ref 22–29)
EOSINOPHIL # BLD AUTO: 0 10E3/UL (ref 0–0.7)
EOSINOPHIL NFR BLD AUTO: 0 %
ERYTHROCYTE [DISTWIDTH] IN BLOOD BY AUTOMATED COUNT: 14.5 % (ref 10–15)
GFR SERPL CREATININE-BSD FRML MDRD: >90 ML/MIN/1.73M2
GLUCOSE SERPL-MCNC: 124 MG/DL (ref 70–99)
GLUCOSE UR STRIP-MCNC: NEGATIVE MG/DL
HCT VFR BLD AUTO: 38.8 % (ref 35–47)
HGB BLD-MCNC: 13.3 G/DL (ref 11.7–15.7)
HGB UR QL STRIP: ABNORMAL
IMM GRANULOCYTES # BLD: 0.1 10E3/UL
IMM GRANULOCYTES NFR BLD: 1 %
KETONES UR STRIP-MCNC: 20 MG/DL
LACTATE SERPL-SCNC: 1.5 MMOL/L (ref 0.7–2)
LEUKOCYTE ESTERASE UR QL STRIP: NEGATIVE
LYMPHOCYTES # BLD AUTO: 0.3 10E3/UL (ref 0.8–5.3)
LYMPHOCYTES NFR BLD AUTO: 3 %
MCH RBC QN AUTO: 29.6 PG (ref 26.5–33)
MCHC RBC AUTO-ENTMCNC: 34.3 G/DL (ref 31.5–36.5)
MCV RBC AUTO: 86 FL (ref 78–100)
MONOCYTES # BLD AUTO: 0.7 10E3/UL (ref 0–1.3)
MONOCYTES NFR BLD AUTO: 8 %
NEUTROPHILS # BLD AUTO: 7.7 10E3/UL (ref 1.6–8.3)
NEUTROPHILS NFR BLD AUTO: 88 %
NITRATE UR QL: NEGATIVE
NRBC # BLD AUTO: 0 10E3/UL
NRBC BLD AUTO-RTO: 0 /100
PH UR STRIP: 8 [PH] (ref 5–7)
PLATELET # BLD AUTO: 165 10E3/UL (ref 150–450)
POTASSIUM SERPL-SCNC: 2.9 MMOL/L (ref 3.4–5.3)
PROT SERPL-MCNC: 7.3 G/DL (ref 6.4–8.3)
RBC # BLD AUTO: 4.49 10E6/UL (ref 3.8–5.2)
RBC URINE: <1 /HPF
SODIUM SERPL-SCNC: 130 MMOL/L (ref 136–145)
SP GR UR STRIP: 1 (ref 1–1.03)
SQUAMOUS EPITHELIAL: <1 /HPF
UROBILINOGEN UR STRIP-MCNC: NORMAL MG/DL
WBC # BLD AUTO: 8.8 10E3/UL (ref 4–11)
WBC URINE: <1 /HPF

## 2023-04-12 PROCEDURE — 36415 COLL VENOUS BLD VENIPUNCTURE: CPT | Performed by: EMERGENCY MEDICINE

## 2023-04-12 PROCEDURE — 258N000003 HC RX IP 258 OP 636: Performed by: EMERGENCY MEDICINE

## 2023-04-12 PROCEDURE — 99284 EMERGENCY DEPT VISIT MOD MDM: CPT | Mod: 25 | Performed by: EMERGENCY MEDICINE

## 2023-04-12 PROCEDURE — 81001 URINALYSIS AUTO W/SCOPE: CPT | Performed by: EMERGENCY MEDICINE

## 2023-04-12 PROCEDURE — 96365 THER/PROPH/DIAG IV INF INIT: CPT | Performed by: EMERGENCY MEDICINE

## 2023-04-12 PROCEDURE — 96361 HYDRATE IV INFUSION ADD-ON: CPT | Performed by: EMERGENCY MEDICINE

## 2023-04-12 PROCEDURE — 96375 TX/PRO/DX INJ NEW DRUG ADDON: CPT | Performed by: EMERGENCY MEDICINE

## 2023-04-12 PROCEDURE — 85025 COMPLETE CBC W/AUTO DIFF WBC: CPT | Performed by: EMERGENCY MEDICINE

## 2023-04-12 PROCEDURE — 80053 COMPREHEN METABOLIC PANEL: CPT | Performed by: EMERGENCY MEDICINE

## 2023-04-12 PROCEDURE — 250N000011 HC RX IP 250 OP 636: Performed by: EMERGENCY MEDICINE

## 2023-04-12 PROCEDURE — 83605 ASSAY OF LACTIC ACID: CPT | Performed by: EMERGENCY MEDICINE

## 2023-04-12 PROCEDURE — 99284 EMERGENCY DEPT VISIT MOD MDM: CPT | Performed by: EMERGENCY MEDICINE

## 2023-04-12 RX ORDER — MAGNESIUM SULFATE 1 G/100ML
1 INJECTION INTRAVENOUS ONCE
Status: COMPLETED | OUTPATIENT
Start: 2023-04-12 | End: 2023-04-12

## 2023-04-12 RX ORDER — DIPHENHYDRAMINE HYDROCHLORIDE 50 MG/ML
25 INJECTION INTRAMUSCULAR; INTRAVENOUS ONCE
Status: COMPLETED | OUTPATIENT
Start: 2023-04-12 | End: 2023-04-12

## 2023-04-12 RX ORDER — DEXAMETHASONE SODIUM PHOSPHATE 10 MG/ML
10 INJECTION, SOLUTION INTRAMUSCULAR; INTRAVENOUS ONCE
Status: COMPLETED | OUTPATIENT
Start: 2023-04-12 | End: 2023-04-12

## 2023-04-12 RX ORDER — HEPARIN SODIUM (PORCINE) LOCK FLUSH IV SOLN 100 UNIT/ML 100 UNIT/ML
5-10 SOLUTION INTRAVENOUS
Status: DISCONTINUED | OUTPATIENT
Start: 2023-04-12 | End: 2023-04-12 | Stop reason: HOSPADM

## 2023-04-12 RX ORDER — SODIUM CHLORIDE 9 MG/ML
INJECTION, SOLUTION INTRAVENOUS CONTINUOUS
Status: DISCONTINUED | OUTPATIENT
Start: 2023-04-12 | End: 2023-04-12 | Stop reason: HOSPADM

## 2023-04-12 RX ORDER — KETOROLAC TROMETHAMINE 15 MG/ML
15 INJECTION, SOLUTION INTRAMUSCULAR; INTRAVENOUS ONCE
Status: COMPLETED | OUTPATIENT
Start: 2023-04-12 | End: 2023-04-12

## 2023-04-12 RX ADMIN — MAGNESIUM SULFATE HEPTAHYDRATE 1 G: 1 INJECTION, SOLUTION INTRAVENOUS at 08:15

## 2023-04-12 RX ADMIN — KETOROLAC TROMETHAMINE 15 MG: 15 INJECTION, SOLUTION INTRAMUSCULAR; INTRAVENOUS at 08:05

## 2023-04-12 RX ADMIN — PROCHLORPERAZINE EDISYLATE 10 MG: 5 INJECTION INTRAMUSCULAR; INTRAVENOUS at 08:07

## 2023-04-12 RX ADMIN — HEPARIN 5 ML: 100 SYRINGE at 11:07

## 2023-04-12 RX ADMIN — DEXAMETHASONE SODIUM PHOSPHATE 10 MG: 10 INJECTION, SOLUTION INTRAMUSCULAR; INTRAVENOUS at 08:11

## 2023-04-12 RX ADMIN — SODIUM CHLORIDE 1500 ML: 9 INJECTION, SOLUTION INTRAVENOUS at 08:03

## 2023-04-12 RX ADMIN — DIPHENHYDRAMINE HYDROCHLORIDE 25 MG: 50 INJECTION, SOLUTION INTRAMUSCULAR; INTRAVENOUS at 08:04

## 2023-04-12 ASSESSMENT — ACTIVITIES OF DAILY LIVING (ADL)
ADLS_ACUITY_SCORE: 35

## 2023-04-12 ASSESSMENT — ENCOUNTER SYMPTOMS
APPETITE CHANGE: 1
HEADACHES: 1
FATIGUE: 1
ACTIVITY CHANGE: 1

## 2023-04-12 NOTE — DISCHARGE INSTRUCTIONS
I was pleased to see that your complete blood counts and chemistries were normal.  Your headache improved with a nonnarcotic migraine headache protocol.  Continue to monitor for return of headache, change in neurologic symptoms such as persistent vomiting, worsening gait balance and gait, altered mentation, altered vision.  If you do develop any focal neurologic symptoms along with a return of headache I would then recommend returning to the ER.

## 2023-04-12 NOTE — ED TRIAGE NOTES
Pt presents today c/o nausea/vomiting and a migraine for a week.      Triage Assessment     Row Name 04/12/23 0621       Triage Assessment (Adult)    Airway WDL WDL       Respiratory WDL    Respiratory WDL WDL       Skin Circulation/Temperature WDL    Skin Circulation/Temperature WDL WDL       Cardiac WDL    Cardiac WDL WDL       Peripheral/Neurovascular WDL    Peripheral Neurovascular WDL WDL       Cognitive/Neuro/Behavioral WDL    Cognitive/Neuro/Behavioral WDL WDL

## 2023-04-12 NOTE — ED PROVIDER NOTES
History     Chief Complaint   Patient presents with     Headache     Nausea, Vomiting, & Diarrhea     HPI  Shaneka Alvarez is a 62 year old female who presents with 1 week history for migraine headache.  Reports headache 10/10 intensity.  She also has had nausea with vomiting.  Nursing chief complaint reports diarrhea but she denies having any loose stool other than one this morning.  The patient does have known metastatic breast cancer to brain and bone.  Headache is bilateral.  She has had no visual complaints or other focal neurologic change.  No change in gait or balance reported.  Does not describe any ataxia.  Still receiving palliative chemotherapy.  Reports a low-grade fever 2 nights ago.  Could not find a thermometer.  Not associate with chills or night sweats.    Allergies:  Allergies   Allergen Reactions     Bactrim [Sulfamethoxazole W/Trimethoprim]      Internal bleeding     Copaxone [Glatiramer] Hives       Problem List:    Patient Active Problem List    Diagnosis Date Noted     Left-sided low back pain with left-sided sciatica 04/05/2023     Priority: Medium     SI (sacroiliac) joint dysfunction 04/05/2023     Priority: Medium     Chronic bilateral low back pain without sciatica 04/05/2023     Priority: Medium     Elevated serum creatinine 03/18/2022     Priority: Medium     Hyponatremia 03/17/2022     Priority: Medium     Vomiting 03/17/2022     Priority: Medium     Bony metastasis 03/17/2022     Priority: Medium     Acute kidney injury (H) 03/17/2022     Priority: Medium     Sepsis without acute organ dysfunction (H)-possible  03/17/2022     Priority: Medium     Closed fracture of pedicle of lumbar vertebra, initial encounter (H) 03/17/2022     Priority: Medium     Acute midline low back pain, unspecified whether sciatica present 03/17/2022     Priority: Medium     Anemia, unspecified type 10/18/2021     Priority: Medium     Hyperphosphatemia 07/28/2021     Priority: Medium     Drug-induced  polyneuropathy (H) 02/18/2021     Priority: Medium     Cancer associated pain 02/03/2021     Priority: Medium     Posterior subcapsular polar age-related cataract of both eyes 01/29/2021     Priority: Medium     Added automatically from request for surgery [id number]       Iris synechiae, bilateral 01/29/2021     Priority: Medium     Added automatically from request for surgery [id number]       Dehydration 12/15/2020     Priority: Medium     Chemotherapy induced nausea and vomiting 12/15/2020     Priority: Medium     Sinus tachycardia 12/15/2020     Priority: Medium     Drug-induced nausea and vomiting 12/15/2020     Priority: Medium     Chemotherapy-induced neutropenia (H) 11/10/2020     Priority: Medium     Pulmonary embolism without acute cor pulmonale, unspecified chronicity, unspecified pulmonary embolism type (H) 08/17/2020     Priority: Medium     Hypokalemia 07/28/2020     Priority: Medium     Bone metastases 10/22/2019     Priority: Medium     Metastatic breast cancer 09/18/2019     Priority: Medium     Metastatic disease (H) 08/31/2019     Priority: Medium     S/P breast reconstruction, bilateral 07/31/2018     Priority: Medium     Anxiety and depression 10/25/2017     Priority: Medium     Hx of breast cancer 10/25/2017     Priority: Medium     Major depressive disorder, recurrent episode, moderate (H) 04/07/2016     Priority: Medium     Anxiety 03/12/2016     Priority: Medium     Migraine without aura and without status migrainosus, not intractable 10/23/2015     Priority: Medium     Obesity, Class II, BMI 35-39.9 10/23/2015     Priority: Medium     Multiple sclerosis (H) 08/11/2015     Priority: Medium     CARDIOVASCULAR SCREENING; LDL GOAL LESS THAN 160 08/11/2015     Priority: Medium     Raynaud's syndrome 08/11/2015     Priority: Medium     Breast cancer (H) 08/11/2015     Priority: Medium     Age 44       Complication of anesthesia 08/11/2015     Priority: Medium     Arthritis 08/11/2015     Priority:  Medium     Autoimmune disorder (H) 08/11/2015     Priority: Medium     Other insomnia 08/11/2015     Priority: Medium     Medication management contract agreement 08/11/2015     Priority: Medium     Ambien 12.5 mg, dispense 30 per month, follow up every 6 months        Neurogenic bladder 12/22/2014     Priority: Medium     Vision changes 12/22/2014     Priority: Medium     Demyelinating disorder (H) 12/22/2014     Priority: Medium     Weakness 11/20/2014     Priority: Medium     GERD (gastroesophageal reflux disease) 10/22/2014     Priority: Medium     History of breast cancer 10/22/2014     Priority: Medium     Overview:   stage 3, diagnosed in 2006 s/p double mastectomy, chemo and radiation.        Migraine 10/22/2014     Priority: Medium        Past Medical History:    Past Medical History:   Diagnosis Date     Breast cancer (H)      Cataract      Chemotherapy induced nausea and vomiting 12/15/2020     Common migraine      Esophageal reflux      H/O bilateral mastectomy      Mild major depression (H)      Multiple sclerosis (H)      Pulmonary embolism (H)        Past Surgical History:    Past Surgical History:   Procedure Laterality Date     CHOLECYSTECTOMY       ENDOBRONCHIAL ULTRASOUND FLEXIBLE N/A 09/05/2019    Procedure: Flexible Bronchoscopy, Endobronchial Ultrasound, Radial Probe;  Surgeon: Sree Sanchez MD;  Location: [facility] OR      REMOVAL OF OVARY/TUBE(S)       HERNIA REPAIR, UMBILICAL       mastectomy  Bilateral 2006     MASTECTOMY, BILATERAL       PHACOEMULSIFICATION CLEAR CORNEA WITH TORIC INTRAOCULAR LENS IMPLANT Left 2/8/2021    Procedure: PHACOEMULSIFICATION, COMPLEX CATARACT, WITH INTRAOCULAR LENS IMPLANT, RESIDENT TORIC LENS LEFT;  Surgeon: Luis Barkley MD;  Location: [facility] OR     PHACOEMULSIFICATION CLEAR CORNEA WITH TORIC INTRAOCULAR LENS IMPLANT Right 3/8/2021    Procedure: PHACOEMULSIFICATION, CATARACT, WITH INTRAOCULAR LENS IMPLANT, TORIC LENS RIGHT;  Surgeon: Luis Barkley  MD [place];  Location: [facility] OR       Family History:    Family History   Problem Relation Age of Onset     Breast Cancer Maternal Aunt 50         at 85     Breast Cancer Cousin 40     Breast Cancer Mother 49        2nd primary, contralateral breast at 69;  at 86     Melanoma Mother      Breast Cancer Maternal Grandmother 40     Ovarian Cancer Maternal Grandmother      Breast Cancer Paternal Grandmother 50         at 60     Brain Cancer Cousin 20     Breast Cancer Paternal Aunt 50         at 60     Breast Cancer Cousin 45        paternal cousin     Anesthesia Reaction No family hx of      Deep Vein Thrombosis No family hx of        Social History:  Marital Status:   [2]  Social History     Tobacco Use     Smoking status: Former     Types: Cigarettes     Quit date: 2005     Years since quittin.3     Smokeless tobacco: Never   Vaping Use     Vaping status: Never Used   Substance Use Topics     Alcohol use: Not Currently     Alcohol/week: 2.0 standard drinks of alcohol     Types: 1 Glasses of wine, 1 Cans of beer per week     Drug use: No        Medications:    acetaminophen (TYLENOL) 500 MG tablet  Buprenorphine HCl (BELBUCA) 300 MCG FILM buccal film  busPIRone (BUSPAR) 15 MG tablet  calcium citrate-vitamin D (CITRACAL) 315-250 MG-UNIT TABS  Cholecalciferol (VITAMIN D3 PO)  ELIQUIS ANTICOAGULANT 5 MG tablet  erenumab-aooe (AIMOVIG) 140 MG/ML injection  LORazepam (ATIVAN) 1 MG tablet  morphine (MSIR) 15 MG IR tablet  naratriptan (AMERGE) 2.5 MG tablet  OLANZapine (ZYPREXA) 5 MG tablet  ondansetron (ZOFRAN ODT) 4 MG ODT tab  promethazine (PHENERGAN) 25 MG tablet  propranolol ER (INDERAL LA) 80 MG 24 hr capsule  traZODone (DESYREL) 50 MG tablet  ubrogepant (UBRELVY) 100 MG tablet  venlafaxine (EFFEXOR XR) 75 MG 24 hr capsule  vitamin B-2 (RIBOFLAVIN) 25 MG TABS tablet          Review of Systems   Constitutional: Positive for activity change, appetite change and fatigue.   Neurological:  Positive for headaches.   All other systems reviewed and are negative.      Physical Exam   BP: (!) 190/123  Pulse: 78  Temp: 97.5  F (36.4  C)  SpO2: 98 %      Physical Exam  Vitals and nursing note reviewed.   Constitutional:       Appearance: She is not ill-appearing.   HENT:      Head: Normocephalic.      Nose: Nose normal.   Eyes:      Conjunctiva/sclera: Conjunctivae normal.   Cardiovascular:      Rate and Rhythm: Normal rate.   Pulmonary:      Effort: Pulmonary effort is normal.   Skin:     General: Skin is warm.      Capillary Refill: Capillary refill takes less than 2 seconds.      Findings: No rash.   Neurological:      General: No focal deficit present.      Mental Status: She is alert and oriented to person, place, and time.      Cranial Nerves: Cranial nerves 2-12 are intact.      Coordination: Coordination is intact.   Psychiatric:         Mood and Affect: Mood normal.         Behavior: Behavior normal.         ED Course                 Procedures                  Results for orders placed or performed during the hospital encounter of 04/12/23 (from the past 24 hour(s))   CBC with platelets differential    Narrative    The following orders were created for panel order CBC with platelets differential.  Procedure                               Abnormality         Status                     ---------                               -----------         ------                     CBC with platelets and d...[[id number]]  Abnormal            Final result                 Please view results for these tests on the individual orders.   Lactic acid whole blood   Result Value Ref Range    Lactic Acid 1.5 0.7 - 2.0 mmol/L   Comprehensive metabolic panel   Result Value Ref Range    Sodium 130 (L) 136 - 145 mmol/L    Potassium 2.9 (L) 3.4 - 5.3 mmol/L    Chloride 85 (L) 98 - 107 mmol/L    Carbon Dioxide (CO2) 28 22 - 29 mmol/L    Anion Gap 17 (H) 7 - 15 mmol/L    Urea Nitrogen 16.0 8.0 - 23.0 mg/dL    Creatinine 0.64  0.51 - 0.95 mg/dL    Calcium 9.7 8.8 - 10.2 mg/dL    Glucose 124 (H) 70 - 99 mg/dL    Alkaline Phosphatase 120 (H) 35 - 104 U/L    AST 33 10 - 35 U/L    ALT 21 10 - 35 U/L    Protein Total 7.3 6.4 - 8.3 g/dL    Albumin 4.1 3.5 - 5.2 g/dL    Bilirubin Total 0.8 <=1.2 mg/dL    GFR Estimate >90 >60 mL/min/1.73m2   CBC with platelets and differential   Result Value Ref Range    WBC Count 8.8 4.0 - 11.0 10e3/uL    RBC Count 4.49 3.80 - 5.20 10e6/uL    Hemoglobin 13.3 11.7 - 15.7 g/dL    Hematocrit 38.8 35.0 - 47.0 %    MCV 86 78 - 100 fL    MCH 29.6 26.5 - 33.0 pg    MCHC 34.3 31.5 - 36.5 g/dL    RDW 14.5 10.0 - 15.0 %    Platelet Count 165 150 - 450 10e3/uL    % Neutrophils 88 %    % Lymphocytes 3 %    % Monocytes 8 %    % Eosinophils 0 %    % Basophils 0 %    % Immature Granulocytes 1 %    NRBCs per 100 WBC 0 <1 /100    Absolute Neutrophils 7.7 1.6 - 8.3 10e3/uL    Absolute Lymphocytes 0.3 (L) 0.8 - 5.3 10e3/uL    Absolute Monocytes 0.7 0.0 - 1.3 10e3/uL    Absolute Eosinophils 0.0 0.0 - 0.7 10e3/uL    Absolute Basophils 0.0 0.0 - 0.2 10e3/uL    Absolute Immature Granulocytes 0.1 <=0.4 10e3/uL    Absolute NRBCs 0.0 10e3/uL   UA with Microscopic reflex to Culture    Specimen: Urine, Clean Catch   Result Value Ref Range    Color Urine Yellow Colorless, Straw, Light Yellow, Yellow    Appearance Urine Slightly Cloudy (A) Clear    Glucose Urine Negative Negative mg/dL    Bilirubin Urine Negative Negative    Ketones Urine 20 (A) Negative mg/dL    Specific Gravity Urine 1.004 1.003 - 1.035    Blood Urine Small (A) Negative    pH Urine 8.0 (H) 5.0 - 7.0    Protein Albumin Urine Negative Negative mg/dL    Urobilinogen Urine Normal Normal, 2.0 mg/dL    Nitrite Urine Negative Negative    Leukocyte Esterase Urine Negative Negative    RBC Urine <1 <=2 /HPF    WBC Urine <1 <=5 /HPF    Squamous Epithelials Urine <1 <=1 /HPF    Narrative    Urine Culture not indicated     *Note: Due to a large number of results and/or encounters for  "the requested time period, some results have not been displayed. A complete set of results can be found in Results Review.       Medications   0.9% sodium chloride BOLUS (0 mLs Intravenous Stopped 4/12/23 1002)     Followed by   sodium chloride 0.9% infusion (has no administration in time range)   heparin 100 UNIT/ML injection 5-10 mL (has no administration in time range)   ketorolac (TORADOL) injection 15 mg (15 mg Intravenous $Given 4/12/23 0805)   prochlorperazine (COMPAZINE) injection 10 mg (10 mg Intravenous $Given 4/12/23 0807)   magnesium sulfate 1 g in 100 mL D5W intermittent infusion (0 g Intravenous Stopped 4/12/23 0852)   diphenhydrAMINE (BENADRYL) injection 25 mg (25 mg Intravenous $Given 4/12/23 0804)   dexamethasone PF (DECADRON) injection 10 mg (10 mg Intravenous $Given 4/12/23 0811)       Assessments & Plan (with Medical Decision Making) Shaneka is 62 years of age.  Long history for breast cancer with imaging documenting  bone metastasis.  No documentation of brain metastasis.  There was report from 2019 that there is some potential lesions that could have been metastatic disease but they would not present on June 2022 MRI of the brain.  The CT of the brain March 2023 shows no worrisome metastatic concerns.  Remains on palliative chemotherapy.  Complaining of \"typical\" migraine type headache.  Duration 5 to 7 days.  Reports headache 10/10.  She has had no visual complaints other than photophobia.  Has had some nausea and vomiting which she states is typical with her migraine headache.  No change in her balance or coordination and no focal neurologic symptoms.  I elected to treat her with a nonnarcotic headache protocol.  Better headache from a 10 down to a 2.   Patient I discussed that if her headache becomes more intense, atypical or any focal neurologic symptoms present that are new she needs to return for advanced neuroimaging given that she does have a malignancy.  Again at this time we do not have " anything to suggest her to be suspicious for brain metastasis.     I have reviewed the nursing notes.    I have reviewed the findings, diagnosis, plan and need for follow up with the patient.    New Prescriptions    No medications on file       Final diagnoses:   Severe headache   Malignant neoplasm of female breast, unspecified estrogen receptor status, unspecified laterality, unspecified site of breast (H) - Brain and bone metastasis       4/12/2023   [facility] EMERGENCY DEPT     Lb Mixon, DO  04/12/23 1040       Lb Mixon, DO  04/12/23 1053

## 2023-04-13 DIAGNOSIS — G47.00 INSOMNIA, UNSPECIFIED TYPE: ICD-10-CM

## 2023-04-17 RX ORDER — TRAZODONE HYDROCHLORIDE 50 MG/1
TABLET, FILM COATED ORAL
Qty: 60 TABLET | Refills: 0 | Status: SHIPPED | OUTPATIENT
Start: 2023-04-17 | End: 2023-01-01

## 2023-04-20 ENCOUNTER — ANCILLARY PROCEDURE (OUTPATIENT)
Dept: CARDIOLOGY | Facility: CLINIC | Age: 63
End: 2023-04-20
Attending: INTERNAL MEDICINE
Payer: COMMERCIAL

## 2023-04-20 ENCOUNTER — ANCILLARY PROCEDURE (OUTPATIENT)
Dept: GENERAL RADIOLOGY | Facility: CLINIC | Age: 63
End: 2023-04-20
Attending: INTERNAL MEDICINE
Payer: COMMERCIAL

## 2023-04-20 DIAGNOSIS — Z51.81 ENCOUNTER FOR MONITORING CARDIOTOXIC DRUG THERAPY: ICD-10-CM

## 2023-04-20 DIAGNOSIS — Z85.3 HISTORY OF BREAST CANCER: Primary | ICD-10-CM

## 2023-04-20 DIAGNOSIS — Z79.899 ENCOUNTER FOR MONITORING CARDIOTOXIC DRUG THERAPY: ICD-10-CM

## 2023-04-20 LAB — LVEF ECHO: NORMAL

## 2023-04-20 PROCEDURE — 99207 PR NO CHARGE LOS: CPT

## 2023-04-20 PROCEDURE — 93306 TTE W/DOPPLER COMPLETE: CPT | Performed by: INTERNAL MEDICINE

## 2023-04-20 RX ORDER — HEPARIN SODIUM (PORCINE) LOCK FLUSH IV SOLN 100 UNIT/ML 100 UNIT/ML
5 SOLUTION INTRAVENOUS ONCE
Status: COMPLETED | OUTPATIENT
Start: 2023-04-20 | End: 2023-04-20

## 2023-04-20 RX ADMIN — HEPARIN SODIUM (PORCINE) LOCK FLUSH IV SOLN 100 UNIT/ML 5 ML: 100 SOLUTION at 14:44

## 2023-04-20 RX ADMIN — Medication 5 ML: at 09:58

## 2023-04-25 ENCOUNTER — VIRTUAL VISIT (OUTPATIENT)
Dept: ONCOLOGY | Facility: CLINIC | Age: 63
End: 2023-04-25
Payer: COMMERCIAL

## 2023-04-25 DIAGNOSIS — E87.6 HYPOKALEMIA: ICD-10-CM

## 2023-04-25 DIAGNOSIS — C50.911 BILATERAL MALIGNANT NEOPLASM OF BREAST IN FEMALE, UNSPECIFIED ESTROGEN RECEPTOR STATUS, UNSPECIFIED SITE OF BREAST (H): ICD-10-CM

## 2023-04-25 DIAGNOSIS — C50.912 BILATERAL MALIGNANT NEOPLASM OF BREAST IN FEMALE, UNSPECIFIED ESTROGEN RECEPTOR STATUS, UNSPECIFIED SITE OF BREAST (H): ICD-10-CM

## 2023-04-25 DIAGNOSIS — C50.919 PRIMARY MALIGNANT NEOPLASM OF BREAST WITH METASTASIS (H): ICD-10-CM

## 2023-04-25 DIAGNOSIS — D70.1 CHEMOTHERAPY-INDUCED NEUTROPENIA (H): ICD-10-CM

## 2023-04-25 DIAGNOSIS — T45.1X5A CHEMOTHERAPY-INDUCED NEUTROPENIA (H): ICD-10-CM

## 2023-04-25 DIAGNOSIS — C79.51 MALIGNANT NEOPLASM METASTATIC TO BONE (H): Primary | ICD-10-CM

## 2023-04-25 PROCEDURE — 99215 OFFICE O/P EST HI 40 MIN: CPT | Mod: VID | Performed by: INTERNAL MEDICINE

## 2023-04-25 RX ORDER — EPINEPHRINE 1 MG/ML
0.3 INJECTION, SOLUTION INTRAMUSCULAR; SUBCUTANEOUS EVERY 5 MIN PRN
Status: CANCELLED | OUTPATIENT
Start: 2023-01-01

## 2023-04-25 RX ORDER — HEPARIN SODIUM (PORCINE) LOCK FLUSH IV SOLN 100 UNIT/ML 100 UNIT/ML
5 SOLUTION INTRAVENOUS
Status: CANCELLED | OUTPATIENT
Start: 2023-01-01

## 2023-04-25 RX ORDER — NALOXONE HYDROCHLORIDE 0.4 MG/ML
0.2 INJECTION, SOLUTION INTRAMUSCULAR; INTRAVENOUS; SUBCUTANEOUS
Status: CANCELLED | OUTPATIENT
Start: 2023-01-01

## 2023-04-25 RX ORDER — METHYLPREDNISOLONE SODIUM SUCCINATE 125 MG/2ML
125 INJECTION, POWDER, LYOPHILIZED, FOR SOLUTION INTRAMUSCULAR; INTRAVENOUS
Status: CANCELLED
Start: 2023-01-01

## 2023-04-25 RX ORDER — DIPHENHYDRAMINE HYDROCHLORIDE 50 MG/ML
50 INJECTION INTRAMUSCULAR; INTRAVENOUS
Status: CANCELLED
Start: 2023-01-01

## 2023-04-25 RX ORDER — MEPERIDINE HYDROCHLORIDE 25 MG/ML
25 INJECTION INTRAMUSCULAR; INTRAVENOUS; SUBCUTANEOUS EVERY 30 MIN PRN
Status: CANCELLED | OUTPATIENT
Start: 2023-01-01

## 2023-04-25 RX ORDER — ALBUTEROL SULFATE 0.83 MG/ML
2.5 SOLUTION RESPIRATORY (INHALATION)
Status: CANCELLED | OUTPATIENT
Start: 2023-01-01

## 2023-04-25 RX ORDER — HEPARIN SODIUM,PORCINE 10 UNIT/ML
5 VIAL (ML) INTRAVENOUS
Status: CANCELLED | OUTPATIENT
Start: 2023-01-01

## 2023-04-25 RX ORDER — ALBUTEROL SULFATE 90 UG/1
1-2 AEROSOL, METERED RESPIRATORY (INHALATION)
Status: CANCELLED
Start: 2023-01-01

## 2023-04-25 NOTE — LETTER
4/25/2023         RE: Shaneka Alvarez  [address] [facility] [address]        Dear Colleague,    Thank you for referring your patient, Shaneka Alvarez, to the [facility]. Please see a copy of my visit note below.    Virtual Visit Details    Type of service:  Video Visit     Originating Location (pt. Location): Home  Distant Location (provider location):  Off-site  Platform used for Video Visit: SLIC games     Video start time. 9:10 AM   Video stop time.  9:15 AM.    ONCOLOGY FOLLOW UP NOTE: 4/25/23        I took the history from reviewing the previous notes that she was following with Dr. Amaya.  I have copied and updated from prior notes.    ONCOLOGY History:    1.  January 2006:  Diagnosed with Stage IIB, T2 N1 M0 invasive lobular carcinoma of the right breast.  Final pathology showed a 4.5 x 3.8 x 2.5 cm, 01/14 lymph nodes positive.  Estrogen, progesterone receptor positive, HER-2 negative.     2.  Genetics.  BRCA1 and 2 mutations not detected. 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       THERAPY TO DATE:   1.  2006:  ECOG 2104 protocol of dose-dense Adriamycin, Cytoxan and Avastin x4 cycles followed by Taxol and Avastin x4 cycles.   2.  03/2007:  Completed 1 year Avastin.   3.  11/2006-01/2007:  Radiotherapy to the right breast of 5040 cGy.   4.  03/[id number]:  Aromasin.  Stopped after moving to the [facility].   5.  01/2016:  Right modified radical mastectomy with latissimus dorsi flap reconstruction and a left prophylactic mastectomy with latissimus dorsi flap reconstruction.     She recently had MRI of the brain as a follow-up for multiple sclerosis and there were multiple calvarial lesions noted which was suspicious for metastatic disease.  There was no evidence of new multiple sclerosis lesions.  She had a PET scan on 8/30/2019 which showed widespread bone metastatic lesions (calvarium, spine, sacrum, pelvis, ribs most prominent at C7, T3, L1 and L4),  hypermetabolic 3 cm lesion in LLL, and mediastinal/hilar LN. CEA wnl at 1.9 and C27-29 elevated to 415 (28 on 8/23/16). A CT guided biopsy of the right iliac bone was obtained on 9/4/19, pathology consistent with metastatic adenocarcinoma (breast), ER/WV negative, HER-2 negative PDL 1 < 1%. A second biopsy was take of the LLL nodule via EBUS on 9/5/19 did not show any malignancy.    C/T/L spine MRI 8/3/19 to 9/2/19 with numerous enhancing lesions of the C, T and L spine, largest around T9 and L1. No evidence of cord compression. Has a L1 compression fracture and impending L4.     Received radiation T12-L5 3000 cGy in 10 fractions from 9/6/2019 till 9/18/2019.  Neurosurgery recommended no surgical intervention but to wear Gorge brace when out of bed and HOB >30 degrees    She started palliative Xeloda 2000/1500 on 10/1/2019 but after just a few doses she noticed nausea, red eyes blurred vision, loss of appetite.  She stopped taking it after 5 doses and was seen by nurse practitioner on 10/7/2019 when she was improving.  At that point she was restarted on Xeloda at a lower dose of 1000 mg twice a day.  As she was not able to tolerate even the lower dose with extreme nausea and vomiting and feeling extremely fatigued and blurry vision, we decided on stopping Xeloda.    We decided on repeating scans and MRI brain on 10/25/2019 showed no intracranial metastatic disease.   Multiple enhancing calvarial lesions may be increased in number and are suggestive of metastatic disease. Thinner imaging on the current study may identify the smaller lesions and may be responsible for  identified more lesions.   There is a single focus of T2 hyperintense signal within the anterior right temporal lobe may represent interval demyelination. There is no evidence for active demyelination.    PET/CT on 11/1/2019 showed favorable response to therapy and Overall FDG uptake within scattered osseous metastases is decreased since 8/30/2019,  particularly within the pelvis. Increased sclerotic appearance of L1 and L4 pathologic compression deformities, likely sequela of recently completed palliative radiation therapy.    No significant FDG uptake in previously demonstrated hypermetabolic mediastinal lymph nodes. Biopsy negative on 9/5/2019.  There is also decreased FDG uptake in the left lower lobe (biopsy negative for  malignancy), now with dense consolidation containing air bronchograms suggestive of infection versus aspiration.  There is diffuse FDG uptake within the esophagus, consistent with esophagitis.    Because of intolerance to Xeloda we decided on switching to weekly paclitaxel on 11/5/2019.    C#2 Taxol 12/4/19- started with 70mg/m2 dose reduction    C#3 Taxol 12/30/2019     PET/CT on 1/24/2020 showed progression of the disease in some of the bone lesions including increase in size and lytic lesion within the vertebral body of C4 measuring 1 cm as opposed to 0.6 cm previously and a new central soft tissue nodule with SUV max 4.1 when previously it was non-hypermetabolic.  There is also some progression noted in the right proximal femur and right iliac bone.  There is decreased metabolic uptake in the right lamina of T9 with SUV max 4.7 when previously it was 8.0.  Other lesions are stable.    CA 27-29 also had increased significantly and it was 257 on 1/28/2020.    We decided on switching to eribulin on 1/28/2020.    C#2 2/18/2020  C#2 D#8 2/25/2020    C#3 D#1 3/18/2020 -delayed by 1 week as per patient's preference because she wanted to visit her family.    She had a repeat PET/CT on 3/27/2020 which showed stable size of the bone metastatic lesions although the FDG avidity was less, consistent with treatment response.    She had MRI of the thoracic spine on 4/3/2020 and it was compared to the one which was done in August 2019 and it showed increased size of several metastatic lesions most notably at T9 but also at T5-T7.  Other lesions at  T10, T11 and T12 appeared improved.    CA 27-29 was also down to 222 on 3/18/2020.    Cycle #4 eribulin ( dose reduced to 1.2 mg/m2 )-4/7/2020-   Cycle #5 Eribulin ( 1.2 mg/m2 )- 4/28/2020   Cycle #6 Eribulin ( 1.2 mg/m2 )- 5/19/2020     Cycle #7 Eribulin ( 1.2 mg/m2 )- 6/9/2020    Cycle #8 Eribulin ( 1.2 mg/m2 )- 6/30/2020     She had a repeat PET scan on 7/17/2020 which showed incidental finding of new acute bilateral pulmonary embolism in the distal left main pulmonary artery extending in the left upper and lower lobes and in the segmental and branch arteries in the right lower lobe.    It also showed mildly increased FDG avidity in the lytic lesion in the T9 lamina and right pedicle with SUV max 5.3, previously 3.9.  That is also slightly increased FDG uptake to the left anterior fifth rib at the costochondral junction SUV max 3.9, previously 2.5.  There is slightly decreased FDG uptake in the right femoral neck lesion SUV max 2.2, previously 2.9.  FDG uptake in other bone lesions is fairly stable.  No new metastasis are seen.    CA 27-29 was 308 on 6/30/2020.  It was 286 on 5/19/2020.    Overall this is consistent with only slight progression versus stable disease.    She was started on Lovenox.    Cycle #9 eribulin 7/21/2020. CA 27-29 was 238    C#10 eribulin 8/18/2020. ( delayed by one week for ANC 0.9 )    C#11 Eribulin 9/8/2020    C#12 Eribulin 9/29/2020     C#13 Eribulin 10/20/2020     PET scan on 11/6/2020 shows progression of the disease with increased FDG uptake in the lytic lesions in the T9/T10 and right posterolateral elements as well as increased FDG uptake in the previously known hypermetabolic right iliac bone lesion suggesting recurrence of previously treated metastasis.  There is new subtle lesion in the right manubrium.  There is also a new fracture in the anterolateral left fourth rib with associated uptake and this could be a pathologic fracture.  Healing fracture in the left anterior fifth  rib lesion.  There is resolution of the left pulmonary emboli.  Decreased FDG uptake of the esophagus consistent with improving inflammation.    CA 27-29 on 10/20/2020 was also elevated at 489.    C#1 Trodelvy on 11/11/2020.  Cycle #2 Trodelvy 12/2/2020  Cycle number 2-day #8 Trodelvy 12/8/2020.    12/15/2020-12/16/2020.  She was admitted to the hospital with nausea vomiting and dehydration.  She had tachycardia and initial lactic acid of 5.  It decreased to 1.4 after 2 L of normal saline.  She also had hypokalemia and ANC was 1.3.  She was treated with IV fluids.  Procalcitonin was negative.  CTA of the chest was negative for pulmonary embolism or pneumonia.  No bacterial infection was documented.  Her medication which she was initially unable to tolerate at home, were restarted and she was discharged home once started to feel better.      We delayed the start of cycle number 3 x 1 week and decrease the dose of chemotherapy by 25%.  She also had neutropenia with ANC of 1.0.      She started cycle number 3-day #1 on 12/29/2020.  CA 27-29 was 392.    Cycle number 3-day #8 1/5/2021.    C#4 Trodelvy 1/20/2021- 75% dose    2/5/2021.  PET/CT overall shows a good response to treatment with improvement of previously hypermetabolic lesions in the spine and pelvis and stability of other bone meta stasis.  There is a single lytic lesion in the right iliac bone which demonstrates slight Yimi increased FDG uptake and has SUV max 4.7 while previously it was SUV max 3.4.  There was diffuse wall thickening of the esophagus with uptake in the esophagus in the fundus of the stomach and this could be seen with inflammation.    Trodelvy cycle #5 - 2/10/2021.  75% of the dose.  CA 27-29 was 337.    Trodelvy cycle #6 - 3/3/2021.  75% of the dose.  Trodelvy cycle #6, D#8 - 3/10/2021.  75% of the dose.    Trodelvy C#8, D#8 on 4/21/2021  CA 27-29 stable at 371 on 4/14/2021    Trodelvy, cycle #9- 5/5/2021        On 2/25/2020 when she had  biopsy of the right acetabulum and it was consistent with metastatic lobular carcinoma.  Again it was Triple Negative.     On 3/18/2020 when she also met with Dr. Arelis Arshad who also advised testing for androgen receptor studies on the biopsy specimen.  Tumor was diffusely androgen receptor positive.      5/26/2021-cycle #10 Trodelvy started    CA 27-29 was 545.    6/11/2021.  PET/CT shows mildly increased uptake in several bone lesions for example in the left anterior iliac crest, mid right iliac crest and left ischium.  Uptake in other bone lesions are similar to previously.    Because of minimal progression of the disease and she is tolerating chemotherapy very well, we decided on continuing the same chemotherapy.    6/16/2021-Trodelvy cycle #11    7/7/2021 -Trodelvy cycle #12    9/14/2021-Trodelvy-cycle #15, day #8.    9/8/2021-CA 27-29 was more elevated and it was 1176.    PET/CT on 9/24/2021 showed stable findings with no evidence of FDG avid metastatic disease within the body.  Left axillary lymph nodes are prominent and hypermetabolic and likely from recent vaccinations in the left deltoid muscle.  Healed bony metastasis seems stable.      Cycle #16, Trodelvy 9/28/2021.  Cycle #17, day #8 Trodelvy was on 10/26/2021.  Cycle #18, day #8 Trodelvy on 11/22/2021       She saw Dr. Mejia whom on 10/6/2021.  She was not considered eligible for Enfortumab trial.    Cycle #19, Trodelvy on 12/7/2021 12/21/2021.  PET/CT showed progression of bony metastatic disease.  There is increase hypermetabolism in both the right and left iliac bones and the sternum.  Other bone lesions are stable.    There is new scattered areas of groundglass throughout both the lungs and these findings are indeterminate but may represent an infectious etiology.  Interval resolution of left axillary FDG avid lymphadenopathy.    She was started on Casodex 150 mg daily on 1/6/2022.        She was admitted to the hospital from  3/17/2022-3/19/2022 for vomiting and diarrhea and  severe back pain.  I reviewed the discharge summary.  CT lumbar spine showed a stable severe chronic L1 pathologic compression fracture.  Stable mild compression deformity of superior endplate of L3 and superior endplate of L4.  The upper margin of the L4 fracture extends slightly into the spinal canal without high-grade canal stenosis and this is also stable.  Nondisplaced fractures coursing through the L3 pedicles bilaterally were seen which were not clearly seen previously this could be acute and likely pathologic.  No extraspinal soft tissue abnormality.  Neurosurgery recommended conservative management.  Her pain was controlled and she was discharged home as she felt better with this.      3/31/2022-PET/CT shows progressive bone meta stasis with progressive FDG uptake in the following lesions. Left iliac crest lesion with max SUV of 7.2, previously 3.5. Right mid iliac crest lytic lesion shows maximum SUV of 7.8, previously 6.9.  T10 vertebral body mixed lytic and sclerotic lesion with an SUV of 7.1, previously not hypermetabolic. Thoracic vertebral bodies 8, 7, 5, and 4 also show hypermetabolic lesions were previously there was no hypermetabolism.  Some of the sclerotic osseous lesions are unchanged and do not show increased hypermetabolism compatible treated lesion such as a severe compression deformity at L1 as well as superior compression deformity at L4.  Medial right clavicle sclerotic lesion with SUV max of 4.8, previously not hypermetabolic. There is also right-sided sternal lesions.      4/14/2022--C#1- switch to single agent Doxil 50 mg per metered squared every 4 weeks.    5/13/2022-cycle #2- Doxil- dose reduced to 40 mg per metered square due to severe nausea/vomiting and migraines requiring IV fluids and IV antiemetics.    6/10/2022-cycle #3-Doxil 40 mg per metered square    7/1/2022- PET/CT shows resolution of the hypermetabolic skeletal  metastasis.    7/6/2022-cycle #4-Doxil 40 mg per metered square  8/5/2022- cycle #5-Doxil  9/1/2022.  Cycle #6-Doxil  9/30/2022- cycle #7-Doxil    10/18/2022.  PET/CT does not show evidence of any FDG activity    11/2/2022-cycle #8-Doxil.  11/30/2022-cycle #9-Doxil.  12/28/2022-cycle #10-Doxil    2/3/2023- C#11- Doxil (delayed by 1 week because neutrophil count was 0.9)-wanted to give her Onpro but at that time it was not approved by insurance.    3/3/2023.  Cycle #12 Doxil.  Given with Onpro.    Repeat PET/CT on 3/27/2023 overall showed very stable findings.    3/31/2023.  Cycle #13 Doxil.  Given with Onpro.      Interval history.  This is a video visit.    I also reviewed notes from ED physician from 4/12/2023 when she presented with increased migraine headaches and nausea and vomiting.  She was treated symptomatically and was discharged home after she started to feel better.    She mentioned that she tolerated chemotherapy well.  Cancer pain is under good control.  No new swellings.  She has been having more issues with migraines and she is going to see neurologist in the next few days.  Currently she remains on Aimovig injections and as needed Amerge.  She gets nausea and vomiting with migraines but otherwise denies any nausea and vomiting from chemo.  She is taking calcium and vitamin D.    ECOG 1    ROS:  Rest of the comprehensive review of the system was negative.        I reviewed other history in epic as below.       PAST MEDICAL HISTORY:     1.  Breast cancer  2.  Multiple sclerosis.   3.  Depression.   4.  Migraines.   5.  Hypertension.   6.  Cholecystectomy and umbilical hernia repair.     SOCIAL HISTORY: She smoked for 5 years but quit many years ago.  Drinks alcohol socially.  She lives with her .  She is a teacher in middle school.       FAMILY HISTORY: She mentions that her mother had breast cancer at 49.  Both grandmothers had breast cancer but she is not sure of the age.  A couple of cousins  have cancers.  Patient has 3 children who are healthy.    Current Outpatient Medications   Medication     Buprenorphine HCl (BELBUCA) 300 MCG FILM buccal film     busPIRone (BUSPAR) 15 MG tablet     calcium citrate-vitamin D (CITRACAL) 315-250 MG-UNIT TABS     Cholecalciferol (VITAMIN D3 PO)     ELIQUIS ANTICOAGULANT 5 MG tablet     erenumab-aooe (AIMOVIG) 140 MG/ML injection     naratriptan (AMERGE) 2.5 MG tablet     OLANZapine (ZYPREXA) 5 MG tablet     ondansetron (ZOFRAN ODT) 4 MG ODT tab     promethazine (PHENERGAN) 25 MG tablet     propranolol ER (INDERAL LA) 80 MG 24 hr capsule     traZODone (DESYREL) 50 MG tablet     ubrogepant (UBRELVY) 100 MG tablet     venlafaxine (EFFEXOR XR) 75 MG 24 hr capsule     vitamin B-2 (RIBOFLAVIN) 25 MG TABS tablet     acetaminophen (TYLENOL) 500 MG tablet     LORazepam (ATIVAN) 1 MG tablet     morphine (MSIR) 15 MG IR tablet     No current facility-administered medications for this visit.        Allergies   Allergen Reactions     Bactrim [Sulfamethoxazole W/Trimethoprim]      Internal bleeding     Copaxone [Glatiramer] Hives          PHYSICAL EXAMINATION:     There were no vitals taken for this visit.  Wt Readings from Last 4 Encounters:   03/31/23 62.4 kg (137 lb 9.6 oz)   03/29/23 61.7 kg (136 lb)   03/12/23 59 kg (130 lb)   03/03/23 64.1 kg (141 lb 6.4 oz)           Constitutional.  Does not seem to be in any acute distress.  Eyes.  No redness or discharge noted.  Respiratory.  Speaking in full sentences.  Breathing seems comfortable without any accessory use of muscles.    Skin.  Visualized his skin does not show any obvious rashes.  Musculoskeletal.  Range of motion for visualized areas is intact.  Neurological.  Alert and oriented x3.  Psychiatric.  Mood, mentation and affect are normal.  Decision making capacity is intact.      The rest of a comprehensive physical examination is deferred due to Public Health Emergency video visit restrictions.        Labs and  Imaging.    Reviewed.    4/12/2023  CBC was unremarkable.    CMP shows sodium 130.  Potassium 2.9.  Chloride 85.  Creatinine 0.64.  Alkaline phosphatase 120.    CA 15-3 was 129 on 3/31/2023      CA 15-3 was 123 on 3/3/2023.      11/30/2022     CA 15-3 was 135.    9/30/2022  CA 15-3 was 159.  CA 27-29 was 1992 on 6/10/2022  CA 27-29 was 3370 on 5/13/2022.  It was 5307 on 4/14/2022 ferritin Doxil was started.      9/28/2021.      Iron studies, ferritin is 101, iron 51, TIBC 268 and iron saturation index 19%.    Echocardiogram 4/20/2023  Left ventricular size, wall motion and function are normal. The ejection  fraction is 60-65%.  Global right ventricular function is normal.  No hemodynamically significant valve abnormalities.  The inferior vena cava is normal.  No pericardial effusion.     This study was compared with the study from 4/7/22. No significant change.      3/27/2023.  PET/CT  1. No new suspicious FDG uptake within the skeleton.  2. Stable numerous non-FDG avid lytic and sclerotic osseous lesions  throughout the axial skeleton.  3. Stable nondisplaced fracture of the left acromion/glenoid, chronic  bilateral healing rib fractures, and compression deformities of the  thoracic and lumbar spine.  4. Hypermetabolic focus in the expected location of the AV node versus  mitral valve, this may represent a normal AV node versus mitral valve  pathology such as vegetations. Recommend follow-up with cardiac  Ultrasound.        11/30/2022-x-ray of the ribs of the left side  There are a few sclerotic lesion seen in the right anterior ribs that were seen on previous PET CTs. These appear to involve the fourth and fifth ribs. There are likely more subtle lesions that were   described on the PET CT scan. Irregularity of the distal end of the left ninth rib may be from metastatic disease or fracture on the oblique view which likely is chronic.       10/18/2022-PET/CT showed no new suspicious FDG uptake within the skeleton.   Stable abnormal FDG avid lytic and sclerotic osseous lesions.  Mild diffuse FDG uptake throughout the large bowel without CT abnormality.    7/1/2022.  PET/CT shows diffuse sclerotic and lytic osseous lesions without any abnormal FDG uptake in the skeleton, consistent with treated disease.  There is evaluation of previous hypermetabolic bone lesions.  Multiple chronic rib fracture deformities and stable compression fracture deformities of T6, L1 and L4.  No suspicious FDG uptake in the chest abdomen or pelvis.    3/31/2022-PET/CT shows progressive bone metastasis with progressive FDG uptake in the following lesions. Left iliac crest lesion with max SUV of 7.2, previously 3.5. Right mid iliac crest lytic lesion shows maximum SUV of 7.8, previously 6.9.  T10 vertebral body mixed lytic and sclerotic lesion with an SUV of 7.1, previously not hypermetabolic. Thoracic vertebral bodies 8, 7, 5, and 4 also show hypermetabolic lesions were previously there was no hypermetabolism.  Some of the sclerotic osseous lesions are unchanged and do not show increased hypermetabolism compatible treated lesion such as a severe compression deformity at L1 as well as superior compression deformity at L4.  Medial right clavicle sclerotic lesion with SUV max of 4.8, previously not hypermetabolic. There is also right-sided sternal lesions.      Biopsy from the right acetabulum shows metastatic lobular carcinoma.  It is triple negative.  Androgen receptor was diffusely positive (90%, moderate intensity) by immunohistochemistry. )  PD-L1 negative.    Foundation one showed CDH1 mutation (initially lobular cancer), CCND1 amplification ( equivocal ). FGF3, FGF4, FGF19 amplification ( Equivocal ). Most promising is CCND1 amplification with abemaciclib showing response in 4/10 patients. FGF3,FGF4 and FGF19 are targetable with pan-FGFR inhibitors.           ASSESSMENT AND PLAN:     Metastatic breast cancer negative breast cancer (androgen receptor  positive ) with extensive involvement of the bones.  There is also a left lower lobe hypermetabolic lesion and mediastinal lymph nodes which are hypermetabolic.  The biopsy from the right iliac bone is consistent with metastatic lobular breast cancer but it is hormone receptor and HER-2 negative and PDL 1 is also negative.    Foundation 1 testing did not reveal any actionable mutations.    She was intolerant to Xeloda and progressed on Taxol and eribulin.      We then switched to recently FDA approved sacituzumab govitecan-hziy (Trodelvy) for TNBC, based on the results of the phase II IMMU-132-01 clinical trial.   She started this on 11/11/2020.      She obtained a second opinion from Dr. Castillo from [facility] who recommended a repeat biopsy to be done to make sure that she really has triple negative breast cancer.      She also met with Dr. Arelis Arshad on 3/18/2021.      After 10 cycles of Trodelvy, she had PET/CT on 6/11/2021 which showed mildly increased uptake in some of the bone lesions while stability in other bone lesions.        After 15 cycles, repeat PET scan showed stable findings.  CA 27-29 has been increasing and it is 1176.      As she was tolerating the chemotherapy well and her quality of life has been decent and scans were stable although she had a rising CA 27-29, we decided on continuing the same chemotherapy because it has been a very slow progression of the disease.    Then she had clear progression of the disease in the bones with increased FDG uptake in both right and left iliac bones and the sternum.    Foundation one showed CDH1 mutation (initially lobular cancer), CCND1 amplification ( equivocal ). FGF3, FGF4, FGF19 amplification ( Equivocal ). Most promising is CCND1 amplification with abemaciclib showing response in 4/10 patients. FGF3,FGF4 and FGF19 are targetable with pan-FGFR inhibitor    As the tumor is diffusely positive for androgen receptor, we decided to start her on  androgen receptor blockers.    I initially recommended enzalutamide 160 mg daily ( Enzalutamide for the Treatment of Androgen Receptor-Expressing Triple-Negative Breast Cancer----J Clin Oncol. 2018 Mar 20; 36(9): 884-890. ).    Eventually we decided to start her on Casodex 150 mg daily instead of Enzalutamide due to cost issues.  Clinical Trial Clin Cancer Res. 2013 Oct 1;19(19):5505-12.   Phase II trial of bicalutamide in patients with androgen receptor-positive, estrogen receptor-negative metastatic Breast Cancer  She started Casodex on 1/6/2022.    She had progressive disease in the bones on the PET scan on 3/31/2022.    We changed to single agent Doxil on 4/14/2022.      She developed significant nausea vomiting and headache so cycle #2 was given on 5/13/2022 with 20% dose reduction which she tolerated well.    Cycle #3 was given on 6/10/2022 with the same dose reduced Doxil.    Repeat PET/CT on 7/1/2022 shows resolution of the FDG avid bony metastasis consistent with treated disease.  No suspicious FDG uptake was seen in the chest abdomen and pelvis.  CA 27-29 was also coming down.     Repeat PET/CT in October 2022 after completing 7 cycles continued to show normal FDG uptake.  CA 15-3 was trending down     Cycle #11 was delayed by 1 week because of chemotherapy-induced neutropenia.  It was received on 2/3/2023.      She received cycle #12 on 3/3/2023 with Onpro.    Repeat PET/CT on 3/27/2023 overall showed very stable findings.    Cycle 13 was given on 3/31/2023 with Onpro support.      She is tolerating chemotherapy well.  Last CA 15-3 was slightly up.  We will keep a watch on this and she will proceed with cycle #14 on 4/28/2023.      Chemotherapy-induced neutropenia.  Continue Onpro because of chemotherapy-induced neutropenia.     Hypokalemia.  When she was in the emergency room with migraines, nausea and vomiting, potassium was 2.9.  Now eating better.  I advised her to eat potassium rich foods like  bananas, dates, kiwis etc.  We will repeat chemistry on 4/28/2023    Focal FDG avidity in the heart.  This is in an expected location of AV node or mitral valve.  This is nonspecific.  We checked an echocardiogram on 4/20/2023 which was unremarkable.  She is asymptomatic.  Continue to monitor clinically.      Bone disease.  She has metastatic disease to the bone.  Previously she got palliative radiation to T12-L5 3000 cGy in 10 fractions from 9/6/2019 till 9/18/2019.  She was evaluated by orthopedics Dr. Bipin Elliott on 11/1/2019 and conservative management was recommended.    She was on Zometa every 3 months. She last received on 9/29/2020.  Because of progression of the disease in the bones, we switched to Xgeva which she received on 11/11/2020.    She had progression of the disease in the bones so we switched to Doxil but we continued xgeva.  PET scan on 7/1/2022 does not show any suspicious FDG uptake in the bones consistent with treated disease.  Repeat PET/CT on 10/18/2022 also does not show any FDG avid lesions in the skeleton.  Her pain is overall stable and well-controlled.  Buprenorphine buccal film.  She hardly requires as needed morphine.  Repeat PET scan shows stable findings.  Continue monthly Xgeva vitamin D and calcium.        Pain management.  Continue buprenorphine buccal film and she has as needed morphine which she has not used recently.  She is following with Dr. Whitaker      Migraines.  Recently dose of monthly Aimovig injections was increased.  Dose of as needed Amerge was also increased.  Migraines are not under good control.  She will follow with neurology in the coming days.      Bilateral pulmonary emboli.  Continue indefinite Eliquis     We did not address the following today.      Shingles.  The rash has dried out.  She completed well with acyclovir and prednisone.  She takes gabapentin for postherpetic neuralgia and is doing better.        Constipation. Continue  senna.    Neuropathy.  This remains mild and stable with neuropathy in her hands.    This is in addition to the chronic balance issues and heat and cold sensitivity from underlying multiple sclerosis which is also stable for years.  Continue to monitor.     Subcutaneous nodule in the scalp.  This looks like an epidermoid cyst.  She thinks it is a stable.  Continue to monitor.       Insomnia.  Continue trazodone.      Wall thickening of esophagus and FDG uptake of fundus of the stomach.  It could be in the setting of inflammation from recent nausea and vomiting.  Symptoms have resolved.  Continue to monitor clinically.    Vision changes.    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  It was thought that this could be due to Taxol as patient reported to the ophthalmologist that she was on Taxol in September 2019 when her symptoms started.  But she was not on Taxol in September 2019 when she started noticing the visual changes so Taxol is not responsible for this.  The first dose of Taxol was on 11/5/2019.   She had left cataract extraction on 2/8/2021 and she has noticed significant improvement in her vision.       Increased FDG ability in the colon.  She had FDG uptake in the cecum and ascending colon but no corresponding lesion was seen on the CT scan.  She is completely asymptomatic so at this time we will continue to observe.    Discussion regarding healthcare directives.  On previous visit she told me that she will bring the health care directive for our records but she forgot so I told her to bring it on next visit.      Breast implant removal.  She tells me that she was planning to do breast implant removal because there was a recall on this.  Her surgery was scheduled for 9/24/2019.  Because of this new development of metastatic breast cancer and her recent radiation, surgery has been canceled.  We discussed that at this time treatment for metastatic breast cancer would  take precedence over the other surgery as the other surgery would require her to be off chemotherapy for several weeks and in that case the chances of cancer progression would be high and I would not recommend that.    Multiple sclerosis.  She will continue to follow with her neurologist Dr. Quiles.  Currently multiple sclerosis is under control and currently she is not taking any medications.    See me back in 4 weeks.    All of her questions were answered to her satisfaction.  She is agreeable and comfortable with the plan.    Dewayne Zepeda MD              Again, thank you for allowing me to participate in the care of your patient.        Sincerely,        Dewayne Zepeda MD

## 2023-04-25 NOTE — NURSING NOTE
Is the patient currently in the state of [place]? YES    Visit mode:VIDEO    If the visit is dropped, the patient can be reconnected by: VIDEO VISIT: Text to cell phone: [phone]    Will anyone else be joining the visit? NO      How would you like to obtain your AVS? MyChart    Are changes needed to the allergy or medication list? NO    Reason for visit: Video Visit      TATYANA Champagne

## 2023-04-25 NOTE — PROGRESS NOTES
Virtual Visit Details    Type of service:  Video Visit     Originating Location (pt. Location): Home  Distant Location (provider location):  Off-site  Platform used for Video Visit: Watch-Sites     Video start time. 9:10 AM   Video stop time.  9:15 AM.

## 2023-04-25 NOTE — PROGRESS NOTES
ONCOLOGY FOLLOW UP NOTE: 4/25/23        I took the history from reviewing the previous notes that she was following with Dr. Amaya.  I have copied and updated from prior notes.    ONCOLOGY History:    1.  January 2006:  Diagnosed with Stage IIB, T2 N1 M0 invasive lobular carcinoma of the right breast.  Final pathology showed a 4.5 x 3.8 x 2.5 cm, 01/14 lymph nodes positive.  Estrogen, progesterone receptor positive, HER-2 negative.     2.  Genetics.  BRCA1 and 2 mutations not detected. 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       THERAPY TO DATE:   1.  2006:  ECOG 2104 protocol of dose-dense Adriamycin, Cytoxan and Avastin x4 cycles followed by Taxol and Avastin x4 cycles.   2.  03/2007:  Completed 1 year Avastin.   3.  11/2006-01/2007:  Radiotherapy to the right breast of 5040 cGy.   4.  03/[id number]:  Aromasin.  Stopped after moving to the [facility].   5.  01/2016:  Right modified radical mastectomy with latissimus dorsi flap reconstruction and a left prophylactic mastectomy with latissimus dorsi flap reconstruction.     She recently had MRI of the brain as a follow-up for multiple sclerosis and there were multiple calvarial lesions noted which was suspicious for metastatic disease.  There was no evidence of new multiple sclerosis lesions.  She had a PET scan on 8/30/2019 which showed widespread bone metastatic lesions (calvarium, spine, sacrum, pelvis, ribs most prominent at C7, T3, L1 and L4), hypermetabolic 3 cm lesion in LLL, and mediastinal/hilar LN. CEA wnl at 1.9 and C27-29 elevated to 415 (28 on 8/23/16). A CT guided biopsy of the right iliac bone was obtained on 9/4/19, pathology consistent with metastatic adenocarcinoma (breast), ER/DE negative, HER-2 negative PDL 1 < 1%. A second biopsy was take of the LLL nodule via EBUS on 9/5/19 did not show any malignancy.    C/T/L spine MRI 8/3/19 to 9/2/19 with numerous enhancing lesions of the C, T and L spine, largest around T9 and L1. No evidence of  cord compression. Has a L1 compression fracture and impending L4.     Received radiation T12-L5 3000 cGy in 10 fractions from 9/6/2019 till 9/18/2019.  Neurosurgery recommended no surgical intervention but to wear Gorge brace when out of bed and HOB >30 degrees    She started palliative Xeloda 2000/1500 on 10/1/2019 but after just a few doses she noticed nausea, red eyes blurred vision, loss of appetite.  She stopped taking it after 5 doses and was seen by nurse practitioner on 10/7/2019 when she was improving.  At that point she was restarted on Xeloda at a lower dose of 1000 mg twice a day.  As she was not able to tolerate even the lower dose with extreme nausea and vomiting and feeling extremely fatigued and blurry vision, we decided on stopping Xeloda.    We decided on repeating scans and MRI brain on 10/25/2019 showed no intracranial metastatic disease.   Multiple enhancing calvarial lesions may be increased in number and are suggestive of metastatic disease. Thinner imaging on the current study may identify the smaller lesions and may be responsible for  identified more lesions.   There is a single focus of T2 hyperintense signal within the anterior right temporal lobe may represent interval demyelination. There is no evidence for active demyelination.    PET/CT on 11/1/2019 showed favorable response to therapy and Overall FDG uptake within scattered osseous metastases is decreased since 8/30/2019, particularly within the pelvis. Increased sclerotic appearance of L1 and L4 pathologic compression deformities, likely sequela of recently completed palliative radiation therapy.    No significant FDG uptake in previously demonstrated hypermetabolic mediastinal lymph nodes. Biopsy negative on 9/5/2019.  There is also decreased FDG uptake in the left lower lobe (biopsy negative for  malignancy), now with dense consolidation containing air bronchograms suggestive of infection versus aspiration.  There is diffuse FDG  uptake within the esophagus, consistent with esophagitis.    Because of intolerance to Xeloda we decided on switching to weekly paclitaxel on 11/5/2019.    C#2 Taxol 12/4/19- started with 70mg/m2 dose reduction    C#3 Taxol 12/30/2019     PET/CT on 1/24/2020 showed progression of the disease in some of the bone lesions including increase in size and lytic lesion within the vertebral body of C4 measuring 1 cm as opposed to 0.6 cm previously and a new central soft tissue nodule with SUV max 4.1 when previously it was non-hypermetabolic.  There is also some progression noted in the right proximal femur and right iliac bone.  There is decreased metabolic uptake in the right lamina of T9 with SUV max 4.7 when previously it was 8.0.  Other lesions are stable.    CA 27-29 also had increased significantly and it was 257 on 1/28/2020.    We decided on switching to eribulin on 1/28/2020.    C#2 2/18/2020  C#2 D#8 2/25/2020    C#3 D#1 3/18/2020 -delayed by 1 week as per patient's preference because she wanted to visit her family.    She had a repeat PET/CT on 3/27/2020 which showed stable size of the bone metastatic lesions although the FDG avidity was less, consistent with treatment response.    She had MRI of the thoracic spine on 4/3/2020 and it was compared to the one which was done in August 2019 and it showed increased size of several metastatic lesions most notably at T9 but also at T5-T7.  Other lesions at T10, T11 and T12 appeared improved.    CA 27-29 was also down to 222 on 3/18/2020.    Cycle #4 eribulin ( dose reduced to 1.2 mg/m2 )-4/7/2020-   Cycle #5 Eribulin ( 1.2 mg/m2 )- 4/28/2020   Cycle #6 Eribulin ( 1.2 mg/m2 )- 5/19/2020     Cycle #7 Eribulin ( 1.2 mg/m2 )- 6/9/2020    Cycle #8 Eribulin ( 1.2 mg/m2 )- 6/30/2020     She had a repeat PET scan on 7/17/2020 which showed incidental finding of new acute bilateral pulmonary embolism in the distal left main pulmonary artery extending in the left upper and lower  lobes and in the segmental and branch arteries in the right lower lobe.    It also showed mildly increased FDG avidity in the lytic lesion in the T9 lamina and right pedicle with SUV max 5.3, previously 3.9.  That is also slightly increased FDG uptake to the left anterior fifth rib at the costochondral junction SUV max 3.9, previously 2.5.  There is slightly decreased FDG uptake in the right femoral neck lesion SUV max 2.2, previously 2.9.  FDG uptake in other bone lesions is fairly stable.  No new metastasis are seen.    CA 27-29 was 308 on 6/30/2020.  It was 286 on 5/19/2020.    Overall this is consistent with only slight progression versus stable disease.    She was started on Lovenox.    Cycle #9 eribulin 7/21/2020. CA 27-29 was 238    C#10 eribulin 8/18/2020. ( delayed by one week for ANC 0.9 )    C#11 Eribulin 9/8/2020    C#12 Eribulin 9/29/2020     C#13 Eribulin 10/20/2020     PET scan on 11/6/2020 shows progression of the disease with increased FDG uptake in the lytic lesions in the T9/T10 and right posterolateral elements as well as increased FDG uptake in the previously known hypermetabolic right iliac bone lesion suggesting recurrence of previously treated metastasis.  There is new subtle lesion in the right manubrium.  There is also a new fracture in the anterolateral left fourth rib with associated uptake and this could be a pathologic fracture.  Healing fracture in the left anterior fifth rib lesion.  There is resolution of the left pulmonary emboli.  Decreased FDG uptake of the esophagus consistent with improving inflammation.    CA 27-29 on 10/20/2020 was also elevated at 489.    C#1 Trodelvy on 11/11/2020.  Cycle #2 Trodelvy 12/2/2020  Cycle number 2-day #8 Trodelvy 12/8/2020.    12/15/2020-12/16/2020.  She was admitted to the hospital with nausea vomiting and dehydration.  She had tachycardia and initial lactic acid of 5.  It decreased to 1.4 after 2 L of normal saline.  She also had hypokalemia  and ANC was 1.3.  She was treated with IV fluids.  Procalcitonin was negative.  CTA of the chest was negative for pulmonary embolism or pneumonia.  No bacterial infection was documented.  Her medication which she was initially unable to tolerate at home, were restarted and she was discharged home once started to feel better.      We delayed the start of cycle number 3 x 1 week and decrease the dose of chemotherapy by 25%.  She also had neutropenia with ANC of 1.0.      She started cycle number 3-day #1 on 12/29/2020.  CA 27-29 was 392.    Cycle number 3-day #8 1/5/2021.    C#4 Trodelvy 1/20/2021- 75% dose    2/5/2021.  PET/CT overall shows a good response to treatment with improvement of previously hypermetabolic lesions in the spine and pelvis and stability of other bone meta stasis.  There is a single lytic lesion in the right iliac bone which demonstrates slight Yimi increased FDG uptake and has SUV max 4.7 while previously it was SUV max 3.4.  There was diffuse wall thickening of the esophagus with uptake in the esophagus in the fundus of the stomach and this could be seen with inflammation.    Trodelvy cycle #5 - 2/10/2021.  75% of the dose.  CA 27-29 was 337.    Trodelvy cycle #6 - 3/3/2021.  75% of the dose.  Trodelvy cycle #6, D#8 - 3/10/2021.  75% of the dose.    Trodelvy C#8, D#8 on 4/21/2021  CA 27-29 stable at 371 on 4/14/2021    Trodelvy, cycle #9- 5/5/2021        On 2/25/2020 when she had biopsy of the right acetabulum and it was consistent with metastatic lobular carcinoma.  Again it was Triple Negative.     On 3/18/2020 when she also met with Dr. Arelis Arshad who also advised testing for androgen receptor studies on the biopsy specimen.  Tumor was diffusely androgen receptor positive.      5/26/2021-cycle #10 Trodelvy started    CA 27-29 was 545.    6/11/2021.  PET/CT shows mildly increased uptake in several bone lesions for example in the left anterior iliac crest, mid right iliac crest and left  ischium.  Uptake in other bone lesions are similar to previously.    Because of minimal progression of the disease and she is tolerating chemotherapy very well, we decided on continuing the same chemotherapy.    6/16/2021-Trodelvy cycle #11    7/7/2021 -Trodelvy cycle #12    9/14/2021-Trodelvy-cycle #15, day #8.    9/8/2021-CA 27-29 was more elevated and it was 1176.    PET/CT on 9/24/2021 showed stable findings with no evidence of FDG avid metastatic disease within the body.  Left axillary lymph nodes are prominent and hypermetabolic and likely from recent vaccinations in the left deltoid muscle.  Healed bony metastasis seems stable.      Cycle #16, Trodelvy 9/28/2021.  Cycle #17, day #8 Trodelvy was on 10/26/2021.  Cycle #18, day #8 Trodelvy on 11/22/2021       She saw Dr. Mejia whom on 10/6/2021.  She was not considered eligible for Enfortumab trial.    Cycle #19, Trodelvy on 12/7/2021 12/21/2021.  PET/CT showed progression of bony metastatic disease.  There is increase hypermetabolism in both the right and left iliac bones and the sternum.  Other bone lesions are stable.    There is new scattered areas of groundglass throughout both the lungs and these findings are indeterminate but may represent an infectious etiology.  Interval resolution of left axillary FDG avid lymphadenopathy.    She was started on Casodex 150 mg daily on 1/6/2022.        She was admitted to the hospital from 3/17/2022-3/19/2022 for vomiting and diarrhea and  severe back pain.  I reviewed the discharge summary.  CT lumbar spine showed a stable severe chronic L1 pathologic compression fracture.  Stable mild compression deformity of superior endplate of L3 and superior endplate of L4.  The upper margin of the L4 fracture extends slightly into the spinal canal without high-grade canal stenosis and this is also stable.  Nondisplaced fractures coursing through the L3 pedicles bilaterally were seen which were not clearly seen previously  this could be acute and likely pathologic.  No extraspinal soft tissue abnormality.  Neurosurgery recommended conservative management.  Her pain was controlled and she was discharged home as she felt better with this.      3/31/2022-PET/CT shows progressive bone meta stasis with progressive FDG uptake in the following lesions. Left iliac crest lesion with max SUV of 7.2, previously 3.5. Right mid iliac crest lytic lesion shows maximum SUV of 7.8, previously 6.9.  T10 vertebral body mixed lytic and sclerotic lesion with an SUV of 7.1, previously not hypermetabolic. Thoracic vertebral bodies 8, 7, 5, and 4 also show hypermetabolic lesions were previously there was no hypermetabolism.  Some of the sclerotic osseous lesions are unchanged and do not show increased hypermetabolism compatible treated lesion such as a severe compression deformity at L1 as well as superior compression deformity at L4.  Medial right clavicle sclerotic lesion with SUV max of 4.8, previously not hypermetabolic. There is also right-sided sternal lesions.      4/14/2022--C#1- switch to single agent Doxil 50 mg per metered squared every 4 weeks.    5/13/2022-cycle #2- Doxil- dose reduced to 40 mg per metered square due to severe nausea/vomiting and migraines requiring IV fluids and IV antiemetics.    6/10/2022-cycle #3-Doxil 40 mg per metered square    7/1/2022- PET/CT shows resolution of the hypermetabolic skeletal metastasis.    7/6/2022-cycle #4-Doxil 40 mg per metered square  8/5/2022- cycle #5-Doxil  9/1/2022.  Cycle #6-Doxil  9/30/2022- cycle #7-Doxil    10/18/2022.  PET/CT does not show evidence of any FDG activity    11/2/2022-cycle #8-Doxil.  11/30/2022-cycle #9-Doxil.  12/28/2022-cycle #10-Doxil    2/3/2023- C#11- Doxil (delayed by 1 week because neutrophil count was 0.9)-wanted to give her Onpro but at that time it was not approved by insurance.    3/3/2023.  Cycle #12 Doxil.  Given with Onpro.    Repeat PET/CT on 3/27/2023 overall  showed very stable findings.    3/31/2023.  Cycle #13 Doxil.  Given with Onpro.      Interval history.  This is a video visit.    I also reviewed notes from ED physician from 4/12/2023 when she presented with increased migraine headaches and nausea and vomiting.  She was treated symptomatically and was discharged home after she started to feel better.    She mentioned that she tolerated chemotherapy well.  Cancer pain is under good control.  No new swellings.  She has been having more issues with migraines and she is going to see neurologist in the next few days.  Currently she remains on Aimovig injections and as needed Amerge.  She gets nausea and vomiting with migraines but otherwise denies any nausea and vomiting from chemo.  She is taking calcium and vitamin D.    ECOG 1    ROS:  Rest of the comprehensive review of the system was negative.        I reviewed other history in epic as below.       PAST MEDICAL HISTORY:     1.  Breast cancer  2.  Multiple sclerosis.   3.  Depression.   4.  Migraines.   5.  Hypertension.   6.  Cholecystectomy and umbilical hernia repair.     SOCIAL HISTORY: She smoked for 5 years but quit many years ago.  Drinks alcohol socially.  She lives with her .  She is a teacher in middle school.       FAMILY HISTORY: She mentions that her mother had breast cancer at 49.  Both grandmothers had breast cancer but she is not sure of the age.  A couple of cousins have cancers.  Patient has 3 children who are healthy.    Current Outpatient Medications   Medication     Buprenorphine HCl (BELBUCA) 300 MCG FILM buccal film     busPIRone (BUSPAR) 15 MG tablet     calcium citrate-vitamin D (CITRACAL) 315-250 MG-UNIT TABS     Cholecalciferol (VITAMIN D3 PO)     ELIQUIS ANTICOAGULANT 5 MG tablet     erenumab-aooe (AIMOVIG) 140 MG/ML injection     naratriptan (AMERGE) 2.5 MG tablet     OLANZapine (ZYPREXA) 5 MG tablet     ondansetron (ZOFRAN ODT) 4 MG ODT tab     promethazine (PHENERGAN) 25 MG  tablet     propranolol ER (INDERAL LA) 80 MG 24 hr capsule     traZODone (DESYREL) 50 MG tablet     ubrogepant (UBRELVY) 100 MG tablet     venlafaxine (EFFEXOR XR) 75 MG 24 hr capsule     vitamin B-2 (RIBOFLAVIN) 25 MG TABS tablet     acetaminophen (TYLENOL) 500 MG tablet     LORazepam (ATIVAN) 1 MG tablet     morphine (MSIR) 15 MG IR tablet     No current facility-administered medications for this visit.        Allergies   Allergen Reactions     Bactrim [Sulfamethoxazole W/Trimethoprim]      Internal bleeding     Copaxone [Glatiramer] Hives          PHYSICAL EXAMINATION:     There were no vitals taken for this visit.  Wt Readings from Last 4 Encounters:   03/31/23 62.4 kg (137 lb 9.6 oz)   03/29/23 61.7 kg (136 lb)   03/12/23 59 kg (130 lb)   03/03/23 64.1 kg (141 lb 6.4 oz)           Constitutional.  Does not seem to be in any acute distress.  Eyes.  No redness or discharge noted.  Respiratory.  Speaking in full sentences.  Breathing seems comfortable without any accessory use of muscles.    Skin.  Visualized his skin does not show any obvious rashes.  Musculoskeletal.  Range of motion for visualized areas is intact.  Neurological.  Alert and oriented x3.  Psychiatric.  Mood, mentation and affect are normal.  Decision making capacity is intact.      The rest of a comprehensive physical examination is deferred due to Public Health Emergency video visit restrictions.        Labs and Imaging.    Reviewed.    4/12/2023  CBC was unremarkable.    CMP shows sodium 130.  Potassium 2.9.  Chloride 85.  Creatinine 0.64.  Alkaline phosphatase 120.    CA 15-3 was 129 on 3/31/2023      CA 15-3 was 123 on 3/3/2023.      11/30/2022     CA 15-3 was 135.    9/30/2022  CA 15-3 was 159.  CA 27-29 was 1992 on 6/10/2022  CA 27-29 was 3370 on 5/13/2022.  It was 5307 on 4/14/2022 ferritin Doxil was started.      9/28/2021.      Iron studies, ferritin is 101, iron 51, TIBC 268 and iron saturation index 19%.    Echocardiogram  4/20/2023  Left ventricular size, wall motion and function are normal. The ejection  fraction is 60-65%.  Global right ventricular function is normal.  No hemodynamically significant valve abnormalities.  The inferior vena cava is normal.  No pericardial effusion.     This study was compared with the study from 4/7/22. No significant change.      3/27/2023.  PET/CT  1. No new suspicious FDG uptake within the skeleton.  2. Stable numerous non-FDG avid lytic and sclerotic osseous lesions  throughout the axial skeleton.  3. Stable nondisplaced fracture of the left acromion/glenoid, chronic  bilateral healing rib fractures, and compression deformities of the  thoracic and lumbar spine.  4. Hypermetabolic focus in the expected location of the AV node versus  mitral valve, this may represent a normal AV node versus mitral valve  pathology such as vegetations. Recommend follow-up with cardiac  Ultrasound.        11/30/2022-x-ray of the ribs of the left side  There are a few sclerotic lesion seen in the right anterior ribs that were seen on previous PET CTs. These appear to involve the fourth and fifth ribs. There are likely more subtle lesions that were   described on the PET CT scan. Irregularity of the distal end of the left ninth rib may be from metastatic disease or fracture on the oblique view which likely is chronic.       10/18/2022-PET/CT showed no new suspicious FDG uptake within the skeleton.  Stable abnormal FDG avid lytic and sclerotic osseous lesions.  Mild diffuse FDG uptake throughout the large bowel without CT abnormality.    7/1/2022.  PET/CT shows diffuse sclerotic and lytic osseous lesions without any abnormal FDG uptake in the skeleton, consistent with treated disease.  There is evaluation of previous hypermetabolic bone lesions.  Multiple chronic rib fracture deformities and stable compression fracture deformities of T6, L1 and L4.  No suspicious FDG uptake in the chest abdomen or  pelvis.    3/31/2022-PET/CT shows progressive bone metastasis with progressive FDG uptake in the following lesions. Left iliac crest lesion with max SUV of 7.2, previously 3.5. Right mid iliac crest lytic lesion shows maximum SUV of 7.8, previously 6.9.  T10 vertebral body mixed lytic and sclerotic lesion with an SUV of 7.1, previously not hypermetabolic. Thoracic vertebral bodies 8, 7, 5, and 4 also show hypermetabolic lesions were previously there was no hypermetabolism.  Some of the sclerotic osseous lesions are unchanged and do not show increased hypermetabolism compatible treated lesion such as a severe compression deformity at L1 as well as superior compression deformity at L4.  Medial right clavicle sclerotic lesion with SUV max of 4.8, previously not hypermetabolic. There is also right-sided sternal lesions.      Biopsy from the right acetabulum shows metastatic lobular carcinoma.  It is triple negative.  Androgen receptor was diffusely positive (90%, moderate intensity) by immunohistochemistry. )  PD-L1 negative.    Foundation one showed CDH1 mutation (initially lobular cancer), CCND1 amplification ( equivocal ). FGF3, FGF4, FGF19 amplification ( Equivocal ). Most promising is CCND1 amplification with abemaciclib showing response in 4/10 patients. FGF3,FGF4 and FGF19 are targetable with pan-FGFR inhibitors.           ASSESSMENT AND PLAN:     Metastatic breast cancer negative breast cancer (androgen receptor positive ) with extensive involvement of the bones.  There is also a left lower lobe hypermetabolic lesion and mediastinal lymph nodes which are hypermetabolic.  The biopsy from the right iliac bone is consistent with metastatic lobular breast cancer but it is hormone receptor and HER-2 negative and PDL 1 is also negative.    Foundation 1 testing did not reveal any actionable mutations.    She was intolerant to Xeloda and progressed on Taxol and eribulin.      We then switched to recently FDA approved  sacituzumab govitecan-hziy (Trodelvy) for TNBC, based on the results of the phase II IMMU-132-01 clinical trial.   She started this on 11/11/2020.      She obtained a second opinion from Dr. Castillo from [facility] who recommended a repeat biopsy to be done to make sure that she really has triple negative breast cancer.      She also met with Dr. Arelis Arshad on 3/18/2021.      After 10 cycles of Trodelvy, she had PET/CT on 6/11/2021 which showed mildly increased uptake in some of the bone lesions while stability in other bone lesions.        After 15 cycles, repeat PET scan showed stable findings.  CA 27-29 has been increasing and it is 1176.      As she was tolerating the chemotherapy well and her quality of life has been decent and scans were stable although she had a rising CA 27-29, we decided on continuing the same chemotherapy because it has been a very slow progression of the disease.    Then she had clear progression of the disease in the bones with increased FDG uptake in both right and left iliac bones and the sternum.    Foundation one showed CDH1 mutation (initially lobular cancer), CCND1 amplification ( equivocal ). FGF3, FGF4, FGF19 amplification ( Equivocal ). Most promising is CCND1 amplification with abemaciclib showing response in 4/10 patients. FGF3,FGF4 and FGF19 are targetable with pan-FGFR inhibitor    As the tumor is diffusely positive for androgen receptor, we decided to start her on androgen receptor blockers.    I initially recommended enzalutamide 160 mg daily ( Enzalutamide for the Treatment of Androgen Receptor-Expressing Triple-Negative Breast Cancer----J Clin Oncol. 2018 Mar 20; 36(9): 884-890. ).    Eventually we decided to start her on Casodex 150 mg daily instead of Enzalutamide due to cost issues.  Clinical Trial Clin Cancer Res. 2013 Oct 1;19(19):5508-12.   Phase II trial of bicalutamide in patients with androgen receptor-positive, estrogen receptor-negative metastatic  Breast Cancer  She started Casodex on 1/6/2022.    She had progressive disease in the bones on the PET scan on 3/31/2022.    We changed to single agent Doxil on 4/14/2022.      She developed significant nausea vomiting and headache so cycle #2 was given on 5/13/2022 with 20% dose reduction which she tolerated well.    Cycle #3 was given on 6/10/2022 with the same dose reduced Doxil.    Repeat PET/CT on 7/1/2022 shows resolution of the FDG avid bony metastasis consistent with treated disease.  No suspicious FDG uptake was seen in the chest abdomen and pelvis.  CA 27-29 was also coming down.     Repeat PET/CT in October 2022 after completing 7 cycles continued to show normal FDG uptake.  CA 15-3 was trending down     Cycle #11 was delayed by 1 week because of chemotherapy-induced neutropenia.  It was received on 2/3/2023.      She received cycle #12 on 3/3/2023 with Onpro.    Repeat PET/CT on 3/27/2023 overall showed very stable findings.    Cycle 13 was given on 3/31/2023 with Onpro support.      She is tolerating chemotherapy well.  Last CA 15-3 was slightly up.  We will keep a watch on this and she will proceed with cycle #14 on 4/28/2023.      Chemotherapy-induced neutropenia.  Continue Onpro because of chemotherapy-induced neutropenia.     Hypokalemia.  When she was in the emergency room with migraines, nausea and vomiting, potassium was 2.9.  Now eating better.  I advised her to eat potassium rich foods like bananas, dates, kiwis etc.  We will repeat chemistry on 4/28/2023    Focal FDG avidity in the heart.  This is in an expected location of AV node or mitral valve.  This is nonspecific.  We checked an echocardiogram on 4/20/2023 which was unremarkable.  She is asymptomatic.  Continue to monitor clinically.      Bone disease.  She has metastatic disease to the bone.  Previously she got palliative radiation to T12-L5 3000 cGy in 10 fractions from 9/6/2019 till 9/18/2019.  She was evaluated by orthopedics   Bipin Elliott on 11/1/2019 and conservative management was recommended.    She was on Zometa every 3 months. She last received on 9/29/2020.  Because of progression of the disease in the bones, we switched to Xgeva which she received on 11/11/2020.    She had progression of the disease in the bones so we switched to Doxil but we continued xgeva.  PET scan on 7/1/2022 does not show any suspicious FDG uptake in the bones consistent with treated disease.  Repeat PET/CT on 10/18/2022 also does not show any FDG avid lesions in the skeleton.  Her pain is overall stable and well-controlled.  Buprenorphine buccal film.  She hardly requires as needed morphine.  Repeat PET scan shows stable findings.  Continue monthly Xgeva vitamin D and calcium.        Pain management.  Continue buprenorphine buccal film and she has as needed morphine which she has not used recently.  She is following with Dr. Sherman Calderon.  Recently dose of monthly Aimovig injections was increased.  Dose of as needed Amerge was also increased.  Migraines are not under good control.  She will follow with neurology in the coming days.      Bilateral pulmonary emboli.  Continue indefinite Eliquis     We did not address the following today.      Shingles.  The rash has dried out.  She completed well with acyclovir and prednisone.  She takes gabapentin for postherpetic neuralgia and is doing better.        Constipation. Continue senna.    Neuropathy.  This remains mild and stable with neuropathy in her hands.    This is in addition to the chronic balance issues and heat and cold sensitivity from underlying multiple sclerosis which is also stable for years.  Continue to monitor.     Subcutaneous nodule in the scalp.  This looks like an epidermoid cyst.  She thinks it is a stable.  Continue to monitor.       Insomnia.  Continue trazodone.      Wall thickening of esophagus and FDG uptake of fundus of the stomach.  It could be in the setting of  inflammation from recent nausea and vomiting.  Symptoms have resolved.  Continue to monitor clinically.    Vision changes.    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  It was thought that this could be due to Taxol as patient reported to the ophthalmologist that she was on Taxol in September 2019 when her symptoms started.  But she was not on Taxol in September 2019 when she started noticing the visual changes so Taxol is not responsible for this.  The first dose of Taxol was on 11/5/2019.   She had left cataract extraction on 2/8/2021 and she has noticed significant improvement in her vision.       Increased FDG ability in the colon.  She had FDG uptake in the cecum and ascending colon but no corresponding lesion was seen on the CT scan.  She is completely asymptomatic so at this time we will continue to observe.    Discussion regarding healthcare directives.  On previous visit she told me that she will bring the health care directive for our records but she forgot so I told her to bring it on next visit.      Breast implant removal.  She tells me that she was planning to do breast implant removal because there was a recall on this.  Her surgery was scheduled for 9/24/2019.  Because of this new development of metastatic breast cancer and her recent radiation, surgery has been canceled.  We discussed that at this time treatment for metastatic breast cancer would take precedence over the other surgery as the other surgery would require her to be off chemotherapy for several weeks and in that case the chances of cancer progression would be high and I would not recommend that.    Multiple sclerosis.  She will continue to follow with her neurologist Dr. Quiles.  Currently multiple sclerosis is under control and currently she is not taking any medications.    See me back in 4 weeks.    All of her questions were answered to her satisfaction.  She is agreeable and comfortable with  the plan.    Dewayne Zepeda MD

## 2023-04-28 NOTE — PROGRESS NOTES
Port accessed using 20 G 3/4 inch needle.  Labs collected from port.  Line flushed with NS and Heparin.  Pt tolerated procedure.  Port left accessed for infusion.    Joanne Ludwig RN-BSN, PHN, OCN  [facility]

## 2023-04-28 NOTE — PROGRESS NOTES
Infusion Nursing Note:  Shaneka Alvarez presents today for C14D1 Doxil, Neulasta Onpro and Xgeva.    Patient seen by provider today: No   present during visit today: Not Applicable.    Note:   Patient states she has been more fatigued.  Attributes this to the gray skies and rainy weather lately.    Intravenous Access:  Implanted Port.    Treatment Conditions:  Lab Results   Component Value Date    HGB 12.1 04/28/2023    WBC 3.7 (L) 04/28/2023    ANEU 1.3 (L) 03/03/2023    ANEUTAUTO 2.3 04/28/2023     04/28/2023      Lab Results   Component Value Date     04/28/2023    POTASSIUM 4.4 04/28/2023    MAG 2.0 03/12/2023    CR 0.63 04/28/2023    RAFAELA 8.7 04/28/2023    BILITOTAL 0.3 04/28/2023    ALBUMIN 3.1 (L) 04/28/2023    ALT 25 04/28/2023    AST 27 04/28/2023     Results reviewed, labs MET treatment parameters, ok to proceed with treatment.  ECHO/MUGA completed 4/20/23  EF 60-65%.      Post Infusion Assessment:  Patient tolerated infusion without incident.  Patient tolerated injection without incident.  Blood return noted pre and post infusion.  Site patent and intact, free from redness, edema or discomfort.  No evidence of extravasations.  Access discontinued per protocol.   ONPRO  Was placed on patient's: left side of abdomen.  Was placed at 1445 PM  Podpal used: Yes  Patient education included: what patient can expect after application, what colored lights mean on the device, when to remove device, when and where to call with questions or issues, all patients questions answered and that Neulasta administration will occur at 5:45 pm tomorrow.  Patient tolerated administration well.      Discharge Plan:   [facility]  Patient will return 5/26/23 for next appointment.   Patient discharged in stable condition accompanied by: self.  Departure Mode: Ambulatory.      Lauren Shaw RN

## 2023-05-02 NOTE — TELEPHONE ENCOUNTER
Prior Authorization Retail Medication Request    Medication/Dose: dihydroergotamine (MIGRANAL) 4 MG/ML nasal spray  ICD code (if different than what is on RX):      43.709  Previously Tried and Failed:  propranolol, venlafaxine, trazodone, Eliquis, burpar, aimovig  Rationale:  migraine     Insurance Name: [employer]  Insurance ID:  [id number]         Pharmacy Information (if different than what is on RX)  Name:   Phone:

## 2023-05-02 NOTE — TELEPHONE ENCOUNTER
Prior Authorization Retail Medication Request    Medication/Dose: galcanezumab-gnlm (EMGALITY) 120 MG/ML injection 2 mL 11 5/2/2023  --  Sig: Inject 240 mg subcutaneous first month and then inject 120 mg subcutaneous every 28 days      ICD code (if different than what is on RX):   43.709  Previously Tried and Failed:  propranolol, venlafaxine, trazodone, Eliquis, burpar, aimovig  Rationale:  migraine    Insurance Name: [employer]  Insurance ID:  [id number]       Pharmacy Information (if different than what is on RX)  Name:   Phone:

## 2023-05-02 NOTE — PROGRESS NOTES
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 Metastatic breast CA.   Headaches temples and a forehead. Throbs/pounds. Prevents from doing things. headaches worse with any kind of movement. Associated with nausea and vomiting and sound sensitivity.   Bed headache today. Vomited on her way to the Clinic  Patient is on Eliquis     Patient did not inject Aimovig -does not think it helps   Headache frequency -16-20 /month on Aimovig   Current migraine for a week +vertigo -all the time and does not seem to go away. When home and does not move around   New symptoms. Patient is on Xaralto and history of cancer  Patient is on Belbuca but not morphine   Naratriptan -last time was yesterday -took it twice -not much of relief   Promethazine (phenrgen) helps sometimes   Ubrelvy  and  yesterday  Last time -did not help much     Has olanzapine at home for nausea with chemo and did not try it for headache. Helps and no side effects. Discussed she may try it once to break headache cycle. Patient agreed to the trial.   ECHO 4/20/2023 -normal   Propranolol 80 mg ER tolerates -no side effects     Plan:  Brain MRI and MRV for vertigo and persistent headaches not responding to treatment -if headaches were not getting better   Migraine prevention:  Switch from Aimovig to Emgality-needs new PA  A trial of propranolol increase to ER 80 mg twice daily if tolerated and if not-go back to 80 mg daily. Side effects -dizziness with position changes, decreased heart rate or blood pressure, fatigue  Rescue treatment   Try metoclopramide for nausea and vomiting and may take with benedryl    May  "try olanzapine 2.5-5 mg once today for nausea and migraine to see if it helps to break the cycle  Retry Ubrelvy up to 5 times for rescue treatment   Stay hydrated   A trial of Migranal for rescue as needed. Stop with chest pain.   Stop naratriptan   If headache getting worse would recommend to go to the ED.   Follow up in 1-2 weeks to see how you are doing     Spoke to the PharmD -Ok to administer toeradol 15 mg once todat IM in the Clinic. Risk of bleeding reviewed with the aptient     Last ED visit 3/12/2023 -toradol IM and it was helpful and was 15 mg IV provided.   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  Venlafaxine helpful for anxiety/depression but not headaches  Naproxen as needed   Propranolol 60 mg ER -unsure of the effectiveness  Buspar for anxiety and headaches  Gabapentin-caused \"stomach sickness\"  Chlorpromazine -for nausea and has been helpful  Tizanidine -\"does not remember\"  bystolic  sumavel  Amitriptyline   Depakote  Verapamil  Petadolax 75 mg twice daily and was not effective  Physical therapy in the past and unsure of the effectiveness  Have not tried Cefaly  Aimovig  for a few month and stopped 3 years ago-retried and it did not help       DATA:  EXAM: CT HEAD W/O CONTRAST  LOCATION: [facility]  DATE/TIME: 3/12/2023 5:15 PM     INDICATION: Headache; Cancer tumor, known or r o; No new HA; Chronic or history of " CHU  COMPARISON: 06/28/2022  TECHNIQUE: Routine CT Head without IV contrast. Multiplanar reformats. Dose reduction techniques were used.     FINDINGS:  INTRACRANIAL CONTENTS: No intracranial hemorrhage, extraaxial collection, or mass effect.  Small infarctions basal ganglia bilaterally and left thalamus. While too small to be reliably characterized on CT, chronic infarctions in the respective locations   were present on the prior MRI. Moderate presumed chronic small vessel ischemic changes. Mild generalized volume loss. No hydrocephalus.      VISUALIZED ORBITS/SINUSES/MASTOIDS: No intraorbital abnormality. No paranasal sinus mucosal disease. No middle ear or mastoid effusion.     BONES/SOFT TISSUES: No acute abnormality.                                                                      IMPRESSION:  1.  No acute intracranial process.  Objective    BP (!) 128/94   Pulse 103   SpO2 98%   There is no height or weight on file to calculate BMI.   Headache today 8/10 and feeling nausea   Ondansetron provided for nausea, patient had emesis x1, no visible blood    non toxic appearance, alert, in distress due to headache   EYES: Eyes grossly normal to inspection, PERRL, fundoscopic did not do it due to light sensitivity  NEURO: Normal strength and tone, mentation intact, speech normal and cranial nerves 2-12 intact  PSYCH: mentation appears normal, affect normal    2:00 -patient received toradol 15 mg IM and tolerated well, see nursing note and MAR  2:16- took reglan prescribed   Laying  in the room, response well, no vomiting,  in the room  Headache improved  Left clinic in W/C and accompanied by her  feeling better    I discussed all my recommendations with Shaneka Alvarez who verbalizes understanding and comfortable with the plan.  All of patient's questions were answered from the best of my knowledge.  Patient is in agreement with the plan.     79 minutes spent on the date of the encounter doing face to  face visit, chart  review, exam, results review,  meds review, treatment in the clinic, documentation and further activities as noted above    GUTIERREZ Larsen, CNP [facility]  Headache certified  [facility]         I concur with the Admission Order and I certify that services are provided in accordance with Section 42 CFR § 412.3

## 2023-05-02 NOTE — LETTER
5/2/2023       RE: Shaneka Alvarez  [address] [address] [address]       Dear Colleague,    Thank you for referring your patient, Shaneka Alvarez, to the [facility] [place] at [facility]. Please see a copy of my visit note below.        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 Metastatic breast CA.   Headaches temples and a forehead. Throbs/pounds. Prevents from doing things. headaches worse with any kind of movement. Associated with nausea and vomiting and sound sensitivity.   Bed headache today. Vomited on her way to the Clinic  Patient is on Eliquis     Patient did not inject Aimovig -does not think it helps   Headache frequency -16-20 /month on Aimovig   Current migraine for a week +vertigo -all the time and does not seem to go away. When home and does not move around   New symptoms. Patient is on Xaralto and history of cancer  Patient is on Belbuca but not morphine   Naratriptan -last time was yesterday -took it twice -not much of relief   Promethazine (phenrgen) helps sometimes   Ubrelvy  and  yesterday  Last time -did not help much     Has olanzapine at home for nausea with chemo and did not try it for headache. Helps and no side effects. Discussed she may try it once to break headache cycle. Patient agreed to the trial.   ECHO 4/20/2023 -normal   Propranolol 80 mg ER tolerates -no side effects     Plan:  Brain MRI and MRV for vertigo and persistent headaches not responding to treatment -if headaches were not getting better   Migraine prevention:  Switch from Aimovig to  "Emgality-needs new PA  A trial of propranolol increase to ER 80 mg twice daily if tolerated and if not-go back to 80 mg daily. Side effects -dizziness with position changes, decreased heart rate or blood pressure, fatigue  Rescue treatment   Try metoclopramide for nausea and vomiting and may take with benedryl    May try olanzapine 2.5-5 mg once today for nausea and migraine to see if it helps to break the cycle  Retry Ubrelvy up to 5 times for rescue treatment   Stay hydrated   A trial of Migranal for rescue as needed. Stop with chest pain.   Stop naratriptan   If headache getting worse would recommend to go to the ED.   Follow up in 1-2 weeks to see how you are doing     Spoke to the PharmD -Ok to administer toeradol 15 mg once todat IM in the Clinic. Risk of bleeding reviewed with the aptient     Last ED visit 3/12/2023 -toradol IM and it was helpful and was 15 mg IV provided.   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  Venlafaxine helpful for anxiety/depression but not headaches  Naproxen as needed   Propranolol 60 mg ER -unsure of the effectiveness  Buspar for anxiety and headaches  Gabapentin-caused \"stomach sickness\"  Chlorpromazine -for nausea and has been helpful  Tizanidine -\"does not remember\"  bystolic  sumavel  Amitriptyline   Depakote  Verapamil  Petadolax 75 mg twice daily and was not effective  Physical therapy in the past and unsure of the effectiveness  Have " not tried Cefaly  Aimovig  for a few month and stopped 3 years ago-retried and it did not help       DATA:  EXAM: CT HEAD W/O CONTRAST  LOCATION: [facility]  DATE/TIME: 3/12/2023 5:15 PM     INDICATION: Headache; Cancer tumor, known or r o; No new HA; Chronic or history of HA  COMPARISON: 06/28/2022  TECHNIQUE: Routine CT Head without IV contrast. Multiplanar reformats. Dose reduction techniques were used.     FINDINGS:  INTRACRANIAL CONTENTS: No intracranial hemorrhage, extraaxial collection, or mass effect.  Small infarctions basal ganglia bilaterally and left thalamus. While too small to be reliably characterized on CT, chronic infarctions in the respective locations   were present on the prior MRI. Moderate presumed chronic small vessel ischemic changes. Mild generalized volume loss. No hydrocephalus.      VISUALIZED ORBITS/SINUSES/MASTOIDS: No intraorbital abnormality. No paranasal sinus mucosal disease. No middle ear or mastoid effusion.     BONES/SOFT TISSUES: No acute abnormality.                                                                      IMPRESSION:  1.  No acute intracranial process.  Objective    BP (!) 128/94   Pulse 103   SpO2 98%   There is no height or weight on file to calculate BMI.   Headache today 8/10 and feeling nausea   Ondansetron provided for nausea, patient had emesis x1, no visible blood    non toxic appearance, alert, in distress due to headache   EYES: Eyes grossly normal to inspection, PERRL, fundoscopic did not do it due to light sensitivity  NEURO: Normal strength and tone, mentation intact, speech normal and cranial nerves 2-12 intact  PSYCH: mentation appears normal, affect normal    2:00 -patient received toradol 15 mg IM and tolerated well, see nursing note and MAR  2:16- took reglan prescribed   Laying  in the room, response well, no vomiting,  in the room  Headache improved  Left clinic in W/C and accompanied by her   feeling better    I discussed all my recommendations with Shaneka Alvarez who verbalizes understanding and comfortable with the plan.  All of patient's questions were answered from the best of my knowledge.  Patient is in agreement with the plan.     79 minutes spent on the date of the encounter doing face to face visit, chart  review, exam, results review,  meds review, treatment in the clinic, documentation and further activities as noted above            Again, thank you for allowing me to participate in the care of your patient.      Sincerely,    GUTIERREZ Dunn CNP

## 2023-05-02 NOTE — NURSING NOTE
Shaneka was in clinic today for a visit with Emelia Mixon. After speaking with a pharmacist,  Emelia Mixon ordered a 15mg toradol injection.  Shaneka stated she has had headache pain for the past week.  She stated she did not take any headache medications today.   I injected 15mg of toradol into her left arm without complications. Advised her to let us know if in a few days if this has helped her migraine.   This service was provided under the supervising provider of the day, Emelia who was available if needed.    Lot [id number]  Exp 07/24    Fawn Jraamillo RN

## 2023-05-02 NOTE — NURSING NOTE
Pt stated that her pain level had decreased to a 5-6/10 and was feeling better.  I offered a wheelchair and pt accepted.  She was escorted to the front door by our team in a wheel chair.  Informed her to go go the ER at a hospital if she vomited again.

## 2023-05-02 NOTE — PATIENT INSTRUCTIONS
Plan:  Brain MRI and MRV for vertigo and persistent headaches not responding to treatment -if headaches were not getting better   Migraine prevention:  Switch from Aimovig to Emgality-needs new PA  A trial of propranolol increase to ER 80 mg twice daily if tolerated and if not-go back to 80 mg daily. Side effects -dizziness with position changes, decreased heart rate or blood pressure, fatigue  Rescue treatment   Try metoclopramide for nausea and vomiting and may take with benedryl    May try olanzapine 2.5-5 mg once today for nausea and migraine to see if it helps to break the cycle  Retry Ubrelvy up to 5 times for rescue treatment   Stay hydrated   A trial of Migranal for rescue as needed. Stop with chest pain.   Stop naratriptan   If headache getting worse would recommend to go to the ED.   Follow up in 1-2 weeks to see how you are doing

## 2023-05-03 NOTE — TELEPHONE ENCOUNTER
Per Emelia,  I called pt and left message that I am calling to get an update on how she is doing.  She can send a my chart message or call back. Clinic # given.

## 2023-05-04 NOTE — TELEPHONE ENCOUNTER
PRIOR AUTHORIZATION DENIED    Medication: galcanezumab-gnlm (EMGALITY) 120 MG/ML injection - EPA DENIED      Denial Date: 5/2/2023    Denial Rational:       Appeal Information:

## 2023-05-04 NOTE — TELEPHONE ENCOUNTER
PRIOR AUTHORIZATION DENIED    Medication: dihydroergotamine (MIGRANAL) 4 MG/ML nasal spray - EPA DENIED    Denial Date: 5/2/2023    Denial Rational:       Appeal Information:

## 2023-05-12 NOTE — TELEPHONE ENCOUNTER
Reviewed denial. Ordered zolmitriptan nasal to try it. Please let patient know if ineffective after 3 trials we can submit request for Migranal again.   Thank you

## 2023-05-16 NOTE — TELEPHONE ENCOUNTER
Received [facility] message from patient requesting refill of buprenorphine (belbuca).    Last refill: 4/8/23  Last office visit: 3/8/23  Scheduled for follow up 5/31/23     Will route request to MD for review.     Reviewed [place]  Report.

## 2023-05-17 NOTE — TELEPHONE ENCOUNTER
Medication Appeal Initiation    We have initiated an appeal for the requested medication:  Medication:  galcanezumab-gnlm (EMGALITY) 120 MG/ML injection - APPEAL INITIATED  Appeal Start Date:  5/17/2023  Insurance Company: [employer] ([facility]) - Phone [phone] Fax [phone]  Insurance Phone:   Insurance Fax: [phone]  Comments:

## 2023-05-23 NOTE — PROGRESS NOTES
Virtual Visit Details    Type of service:  Video Visit   Video Start Time: 1:04 PM  Video End Time:1:08 PM    Originating Location (pt. Location): Home  Distant Location (provider location):  Off-site  Platform used for Video Visit: Imtiaz

## 2023-05-23 NOTE — LETTER
5/23/2023         RE: Shaneka Alvarez  [address] [facility] [id number]        Dear Colleague,    Thank you for referring your patient, Shaneka Alvarez, to the [facility]. Please see a copy of my visit note below.    ONCOLOGY FOLLOW UP NOTE: 5/23/23        I took the history from reviewing the previous notes that she was following with Dr. Amaya.  I have copied and updated from prior notes.    ONCOLOGY History:    1.  January 2006:  Diagnosed with Stage IIB, T2 N1 M0 invasive lobular carcinoma of the right breast.  Final pathology showed a 4.5 x 3.8 x 2.5 cm, 01/14 lymph nodes positive.  Estrogen, progesterone receptor positive, HER-2 negative.     2.  Genetics.  BRCA1 and 2 mutations not detected. 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       THERAPY TO DATE:   1. 2006:  ECOG 2104 protocol of dose-dense Adriamycin, Cytoxan and Avastin x4 cycles followed by Taxol and Avastin x4 cycles.   2.  03/2007:  Completed 1 year Avastin.   3.  11/2006-01/2007:  Radiotherapy to the right breast of 5040 cGy.   4.  03/[id number]:  Aromasin.  Stopped after moving to the [facility].   5.  01/2016:  Right modified radical mastectomy with latissimus dorsi flap reconstruction and a left prophylactic mastectomy with latissimus dorsi flap reconstruction.     She recently had MRI of the brain as a follow-up for multiple sclerosis and there were multiple calvarial lesions noted which was suspicious for metastatic disease.  There was no evidence of new multiple sclerosis lesions.  She had a PET scan on 8/30/2019 which showed widespread bone metastatic lesions (calvarium, spine, sacrum, pelvis, ribs most prominent at C7, T3, L1 and L4), hypermetabolic 3 cm lesion in LLL, and mediastinal/hilar LN. CEA wnl at 1.9 and C27-29 elevated to 415 (28 on 8/23/16). A CT guided biopsy of the right iliac bone was obtained on 9/4/19, pathology consistent with metastatic adenocarcinoma (breast),  ER/VT negative, HER-2 negative PDL 1 < 1%. A second biopsy was take of the LLL nodule via EBUS on 9/5/19 did not show any malignancy.    C/T/L spine MRI 8/3/19 to 9/2/19 with numerous enhancing lesions of the C, T and L spine, largest around T9 and L1. No evidence of cord compression. Has a L1 compression fracture and impending L4.     Received radiation T12-L5 3000 cGy in 10 fractions from 9/6/2019 till 9/18/2019.  Neurosurgery recommended no surgical intervention but to wear [place] brace when out of bed and HOB >30 degrees    She started palliative Xeloda 2000/1500 on 10/1/2019 but after just a few doses she noticed nausea, red eyes blurred vision, loss of appetite.  She stopped taking it after 5 doses and was seen by nurse practitioner on 10/7/2019 when she was improving.  At that point she was restarted on Xeloda at a lower dose of 1000 mg twice a day.  As she was not able to tolerate even the lower dose with extreme nausea and vomiting and feeling extremely fatigued and blurry vision, we decided on stopping Xeloda.    We decided on repeating scans and MRI brain on 10/25/2019 showed no intracranial metastatic disease.   Multiple enhancing calvarial lesions may be increased in number and are suggestive of metastatic disease. Thinner imaging on the current study may identify the smaller lesions and may be responsible for  identified more lesions.   There is a single focus of T2 hyperintense signal within the anterior right temporal lobe may represent interval demyelination. There is no evidence for active demyelination.    PET/CT on 11/1/2019 showed favorable response to therapy and Overall FDG uptake within scattered osseous metastases is decreased since 8/30/2019, particularly within the pelvis. Increased sclerotic appearance of L1 and L4 pathologic compression deformities, likely sequela of recently completed palliative radiation therapy.    No significant FDG uptake in previously demonstrated hypermetabolic  mediastinal lymph nodes. Biopsy negative on 9/5/2019.  There is also decreased FDG uptake in the left lower lobe (biopsy negative for  malignancy), now with dense consolidation containing air bronchograms suggestive of infection versus aspiration.  There is diffuse FDG uptake within the esophagus, consistent with esophagitis.    Because of intolerance to Xeloda we decided on switching to weekly paclitaxel on 11/5/2019.    C#2 Taxol 12/4/19- started with 70mg/m2 dose reduction    C#3 Taxol 12/30/2019     PET/CT on 1/24/2020 showed progression of the disease in some of the bone lesions including increase in size and lytic lesion within the vertebral body of C4 measuring 1 cm as opposed to 0.6 cm previously and a new central soft tissue nodule with SUV max 4.1 when previously it was non-hypermetabolic.  There is also some progression noted in the right proximal femur and right iliac bone.  There is decreased metabolic uptake in the right lamina of T9 with SUV max 4.7 when previously it was 8.0.  Other lesions are stable.    CA 27-29 also had increased significantly and it was 257 on 1/28/2020.    We decided on switching to eribulin on 1/28/2020.    C#2 2/18/2020  C#2 D#8 2/25/2020    C#3 D#1 3/18/2020 -delayed by 1 week as per patient's preference because she wanted to visit her family.    She had a repeat PET/CT on 3/27/2020 which showed stable size of the bone metastatic lesions although the FDG avidity was less, consistent with treatment response.    She had MRI of the thoracic spine on 4/3/2020 and it was compared to the one which was done in August 2019 and it showed increased size of several metastatic lesions most notably at T9 but also at T5-T7.  Other lesions at T10, T11 and T12 appeared improved.    CA 27-29 was also down to 222 on 3/18/2020.    Cycle #4 eribulin ( dose reduced to 1.2 mg/m2 )-4/7/2020-   Cycle #5 Eribulin ( 1.2 mg/m2 )- 4/28/2020   Cycle #6 Eribulin ( 1.2 mg/m2 )- 5/19/2020     Cycle #7  Eribulin ( 1.2 mg/m2 )- 6/9/2020    Cycle #8 Eribulin ( 1.2 mg/m2 )- 6/30/2020     She had a repeat PET scan on 7/17/2020 which showed incidental finding of new acute bilateral pulmonary embolism in the distal left main pulmonary artery extending in the left upper and lower lobes and in the segmental and branch arteries in the right lower lobe.    It also showed mildly increased FDG avidity in the lytic lesion in the T9 lamina and right pedicle with SUV max 5.3, previously 3.9.  That is also slightly increased FDG uptake to the left anterior fifth rib at the costochondral junction SUV max 3.9, previously 2.5.  There is slightly decreased FDG uptake in the right femoral neck lesion SUV max 2.2, previously 2.9.  FDG uptake in other bone lesions is fairly stable.  No new metastasis are seen.    CA 27-29 was 308 on 6/30/2020.  It was 286 on 5/19/2020.    Overall this is consistent with only slight progression versus stable disease.    She was started on Lovenox.    Cycle #9 eribulin 7/21/2020. CA 27-29 was 238    C#10 eribulin 8/18/2020. ( delayed by one week for ANC 0.9 )    C#11 Eribulin 9/8/2020    C#12 Eribulin 9/29/2020     C#13 Eribulin 10/20/2020     PET scan on 11/6/2020 shows progression of the disease with increased FDG uptake in the lytic lesions in the T9/T10 and right posterolateral elements as well as increased FDG uptake in the previously known hypermetabolic right iliac bone lesion suggesting recurrence of previously treated metastasis.  There is new subtle lesion in the right manubrium.  There is also a new fracture in the anterolateral left fourth rib with associated uptake and this could be a pathologic fracture.  Healing fracture in the left anterior fifth rib lesion.  There is resolution of the left pulmonary emboli.  Decreased FDG uptake of the esophagus consistent with improving inflammation.    CA 27-29 on 10/20/2020 was also elevated at 489.    C#1 Trodelvy on 11/11/2020.  Cycle #2 Trodelvy  12/2/2020  Cycle number 2-day #8 Trodelvy 12/8/2020.    12/15/2020-12/16/2020.  She was admitted to the hospital with nausea vomiting and dehydration.  She had tachycardia and initial lactic acid of 5.  It decreased to 1.4 after 2 L of normal saline.  She also had hypokalemia and ANC was 1.3.  She was treated with IV fluids.  Procalcitonin was negative.  CTA of the chest was negative for pulmonary embolism or pneumonia.  No bacterial infection was documented.  Her medication which she was initially unable to tolerate at home, were restarted and she was discharged home once started to feel better.      We delayed the start of cycle number 3 x 1 week and decrease the dose of chemotherapy by 25%.  She also had neutropenia with ANC of 1.0.      She started cycle number 3-day #1 on 12/29/2020.  CA 27-29 was 392.    Cycle number 3-day #8 1/5/2021.    C#4 Trodelvy 1/20/2021- 75% dose    2/5/2021.  PET/CT overall shows a good response to treatment with improvement of previously hypermetabolic lesions in the spine and pelvis and stability of other bone meta stasis.  There is a single lytic lesion in the right iliac bone which demonstrates slight Yimi increased FDG uptake and has SUV max 4.7 while previously it was SUV max 3.4.  There was diffuse wall thickening of the esophagus with uptake in the esophagus in the fundus of the stomach and this could be seen with inflammation.    Trodelvy cycle #5 - 2/10/2021.  75% of the dose.  CA 27-29 was 337.    Trodelvy cycle #6 - 3/3/2021.  75% of the dose.  Trodelvy cycle #6, D#8 - 3/10/2021.  75% of the dose.    Trodelvy C#8, D#8 on 4/21/2021  CA 27-29 stable at 371 on 4/14/2021    Trodelvy, cycle #9- 5/5/2021        On 2/25/2020 when she had biopsy of the right acetabulum and it was consistent with metastatic lobular carcinoma.  Again it was Triple Negative.     On 3/18/2020 when she also met with Dr. Arelis Arshad who also advised testing for androgen receptor studies on the biopsy  specimen.  Tumor was diffusely androgen receptor positive.      5/26/2021-cycle #10 Trodelvy started    CA 27-29 was 545.    6/11/2021.  PET/CT shows mildly increased uptake in several bone lesions for example in the left anterior iliac crest, mid right iliac crest and left ischium.  Uptake in other bone lesions are similar to previously.    Because of minimal progression of the disease and she is tolerating chemotherapy very well, we decided on continuing the same chemotherapy.    6/16/2021-Trodelvy cycle #11    7/7/2021 -Trodelvy cycle #12    9/14/2021-Trodelvy-cycle #15, day #8.    9/8/2021-CA 27-29 was more elevated and it was 1176.    PET/CT on 9/24/2021 showed stable findings with no evidence of FDG avid metastatic disease within the body.  Left axillary lymph nodes are prominent and hypermetabolic and likely from recent vaccinations in the left deltoid muscle.  Healed bony metastasis seems stable.      Cycle #16, Trodelvy 9/28/2021.  Cycle #17, day #8 Trodelvy was on 10/26/2021.  Cycle #18, day #8 Trodelvy on 11/22/2021       She saw Dr. Mejia whom on 10/6/2021.  She was not considered eligible for Enfortumab trial.    Cycle #19, Trodelvy on 12/7/2021 12/21/2021.  PET/CT showed progression of bony metastatic disease.  There is increase hypermetabolism in both the right and left iliac bones and the sternum.  Other bone lesions are stable.    There is new scattered areas of groundglass throughout both the lungs and these findings are indeterminate but may represent an infectious etiology.  Interval resolution of left axillary FDG avid lymphadenopathy.    She was started on Casodex 150 mg daily on 1/6/2022.        She was admitted to the hospital from 3/17/2022-3/19/2022 for vomiting and diarrhea and  severe back pain.  I reviewed the discharge summary.  CT lumbar spine showed a stable severe chronic L1 pathologic compression fracture.  Stable mild compression deformity of superior endplate of L3 and  superior endplate of L4.  The upper margin of the L4 fracture extends slightly into the spinal canal without high-grade canal stenosis and this is also stable.  Nondisplaced fractures coursing through the L3 pedicles bilaterally were seen which were not clearly seen previously this could be acute and likely pathologic.  No extraspinal soft tissue abnormality.  Neurosurgery recommended conservative management.  Her pain was controlled and she was discharged home as she felt better with this.      3/31/2022-PET/CT shows progressive bone meta stasis with progressive FDG uptake in the following lesions. Left iliac crest lesion with max SUV of 7.2, previously 3.5. Right mid iliac crest lytic lesion shows maximum SUV of 7.8, previously 6.9.  T10 vertebral body mixed lytic and sclerotic lesion with an SUV of 7.1, previously not hypermetabolic. Thoracic vertebral bodies 8, 7, 5, and 4 also show hypermetabolic lesions were previously there was no hypermetabolism.  Some of the sclerotic osseous lesions are unchanged and do not show increased hypermetabolism compatible treated lesion such as a severe compression deformity at L1 as well as superior compression deformity at L4.  Medial right clavicle sclerotic lesion with SUV max of 4.8, previously not hypermetabolic. There is also right-sided sternal lesions.      4/14/2022--C#1- switch to single agent Doxil 50 mg per metered squared every 4 weeks.    5/13/2022-cycle #2- Doxil- dose reduced to 40 mg per metered square due to severe nausea/vomiting and migraines requiring IV fluids and IV antiemetics.    6/10/2022-cycle #3-Doxil 40 mg per metered square    7/1/2022- PET/CT shows resolution of the hypermetabolic skeletal metastasis.    7/6/2022-cycle #4-Doxil 40 mg per metered square  8/5/2022- cycle #5-Doxil  9/1/2022.  Cycle #6-Doxil  9/30/2022- cycle #7-Doxil    10/18/2022.  PET/CT does not show evidence of any FDG activity    11/2/2022-cycle #8-Doxil.  11/30/2022-cycle  #9-Doxil.  12/28/2022-cycle #10-Doxil    2/3/2023- C#11- Doxil (delayed by 1 week because neutrophil count was 0.9)-wanted to give her Onpro but at that time it was not approved by insurance.    3/3/2023.  Cycle #12 Doxil.  Given with Onpro.    Repeat PET/CT on 3/27/2023 overall showed very stable findings.  PET/CT showed focal FDG avidity in the heart. This is in an expected location of AV node or mitral valve.  This is nonspecific.  We checked an echocardiogram on 4/20/2023 which was unremarkable.  She is asymptomatic.  Continue to monitor clinically.      3/31/2023.  Cycle #13 Doxil.  Given with Onpro.     4/12/2023-she presented to ED with increased migraine headaches and nausea and vomiting.  She was treated symptomatically and was discharged home after she started to feel better.      4/28/2023.  Cycle #14 Doxil.  Given with Onpro    Cycle #15 Doxil with Onpro planned for 5/26/2023      Interval history.  This is a video visit.    She is doing well and tolerating chemotherapy well.  Denies any significant nausea vomiting diarrhea or constipation from chemotherapy.  Pain is under good control.  She has not required morphine for quite some time now.  Continues to take calcium and vitamin D.  No new swellings.  She thinks her vision is back to her normal now.  Migraines are about the same as before.  Neuropathy is the same as before.      ECOG 1    ROS:  Rest of the comprehensive review of the system was negative.        I reviewed other history in epic as below.       PAST MEDICAL HISTORY:     1.  Breast cancer  2.  Multiple sclerosis.   3.  Depression.   4.  Migraines.   5.  Hypertension.   6.  Cholecystectomy and umbilical hernia repair.     SOCIAL HISTORY: She smoked for 5 years but quit many years ago.  Drinks alcohol socially.  She lives with her .  She is a teacher in middle school.       FAMILY HISTORY: She mentions that her mother had breast cancer at 49.  Both grandmothers had breast cancer but  she is not sure of the age.  A couple of cousins have cancers.  Patient has 3 children who are healthy.    Current Outpatient Medications   Medication     acetaminophen (TYLENOL) 500 MG tablet     Buprenorphine HCl (BELBUCA) 300 MCG FILM buccal film     busPIRone (BUSPAR) 15 MG tablet     calcium citrate-vitamin D (CITRACAL) 315-250 MG-UNIT TABS     Cholecalciferol (VITAMIN D3 PO)     ELIQUIS ANTICOAGULANT 5 MG tablet     galcanezumab-gnlm (EMGALITY) 120 MG/ML injection     LORazepam (ATIVAN) 1 MG tablet     metoclopramide (REGLAN) 5 MG tablet     morphine (MSIR) 15 MG IR tablet     OLANZapine (ZYPREXA) 5 MG tablet     ondansetron (ZOFRAN ODT) 4 MG ODT tab     propranolol ER (INDERAL LA) 80 MG 24 hr capsule     traZODone (DESYREL) 50 MG tablet     ubrogepant (UBRELVY) 100 MG tablet     venlafaxine (EFFEXOR XR) 75 MG 24 hr capsule     vitamin B-2 (RIBOFLAVIN) 25 MG TABS tablet     ZOLMitriptan (ZOMIG) 5 MG nasal spray     No current facility-administered medications for this visit.        Allergies   Allergen Reactions     Bactrim [Sulfamethoxazole W/Trimethoprim]      Internal bleeding     Copaxone [Glatiramer] Hives          PHYSICAL EXAMINATION:     There were no vitals taken for this visit.  Wt Readings from Last 4 Encounters:   04/28/23 62.3 kg (137 lb 6.4 oz)   03/31/23 62.4 kg (137 lb 9.6 oz)   03/29/23 61.7 kg (136 lb)   03/12/23 59 kg (130 lb)     Constitutional.  Looks well and in no apparent distress.   Eyes.  Without eye redness or apparent jaundice.   Respiratory.  Non labored breathing. Speaking in full sentences.    Skin.  No concerning skin rashes on the skin visualized.   Neurological.  Is alert and oriented.  Psychiatric.  Mood and affect seem appropriate.      The rest of a comprehensive physical examination is deferred due to Public Health Emergency video visit restrictions.      Labs and Imaging.    Reviewed.    4/28/2023  CBC was unremarkable except WBC 3.7 with ANC 2.3.  Lymphocyte 0.5.    CMP  unremarkable except albumin 3.1     CA 15-3 was 115     CA 15-3 was 129 on 3/31/2023      CA 15-3 was 123 on 3/3/2023.      11/30/2022     CA 15-3 was 135.    9/30/2022  CA 15-3 was 159.  CA 27-29 was 1992 on 6/10/2022  CA 27-29 was 3370 on 5/13/2022.  It was 5307 on 4/14/2022 ferritin Doxil was started.      9/28/2021.      Iron studies, ferritin is 101, iron 51, TIBC 268 and iron saturation index 19%.    Echocardiogram 4/20/2023  Left ventricular size, wall motion and function are normal. The ejection  fraction is 60-65%.  Global right ventricular function is normal.  No hemodynamically significant valve abnormalities.  The inferior vena cava is normal.  No pericardial effusion.     This study was compared with the study from 4/7/22. No significant change.      3/27/2023.  PET/CT  1. No new suspicious FDG uptake within the skeleton.  2. Stable numerous non-FDG avid lytic and sclerotic osseous lesions  throughout the axial skeleton.  3. Stable nondisplaced fracture of the left acromion/glenoid, chronic  bilateral healing rib fractures, and compression deformities of the  thoracic and lumbar spine.  4. Hypermetabolic focus in the expected location of the AV node versus  mitral valve, this may represent a normal AV node versus mitral valve  pathology such as vegetations. Recommend follow-up with cardiac  Ultrasound.        11/30/2022-x-ray of the ribs of the left side  There are a few sclerotic lesion seen in the right anterior ribs that were seen on previous PET CTs. These appear to involve the fourth and fifth ribs. There are likely more subtle lesions that were   described on the PET CT scan. Irregularity of the distal end of the left ninth rib may be from metastatic disease or fracture on the oblique view which likely is chronic.       10/18/2022-PET/CT showed no new suspicious FDG uptake within the skeleton.  Stable abnormal FDG avid lytic and sclerotic osseous lesions.  Mild diffuse FDG uptake throughout the  large bowel without CT abnormality.    7/1/2022.  PET/CT shows diffuse sclerotic and lytic osseous lesions without any abnormal FDG uptake in the skeleton, consistent with treated disease.  There is evaluation of previous hypermetabolic bone lesions.  Multiple chronic rib fracture deformities and stable compression fracture deformities of T6, L1 and L4.  No suspicious FDG uptake in the chest abdomen or pelvis.    3/31/2022-PET/CT shows progressive bone metastasis with progressive FDG uptake in the following lesions. Left iliac crest lesion with max SUV of 7.2, previously 3.5. Right mid iliac crest lytic lesion shows maximum SUV of 7.8, previously 6.9.  T10 vertebral body mixed lytic and sclerotic lesion with an SUV of 7.1, previously not hypermetabolic. Thoracic vertebral bodies 8, 7, 5, and 4 also show hypermetabolic lesions were previously there was no hypermetabolism.  Some of the sclerotic osseous lesions are unchanged and do not show increased hypermetabolism compatible treated lesion such as a severe compression deformity at L1 as well as superior compression deformity at L4.  Medial right clavicle sclerotic lesion with SUV max of 4.8, previously not hypermetabolic. There is also right-sided sternal lesions.      Biopsy from the right acetabulum shows metastatic lobular carcinoma.  It is triple negative.  Androgen receptor was diffusely positive (90%, moderate intensity) by immunohistochemistry. )  PD-L1 negative.    Foundation one showed CDH1 mutation (initially lobular cancer), CCND1 amplification ( equivocal ). FGF3, FGF4, FGF19 amplification ( Equivocal ). Most promising is CCND1 amplification with abemaciclib showing response in 4/10 patients. FGF3,FGF4 and FGF19 are targetable with pan-FGFR inhibitors.           ASSESSMENT AND PLAN:     Metastatic breast cancer negative breast cancer (androgen receptor positive ) with extensive involvement of the bones.  There is also a left lower lobe hypermetabolic  lesion and mediastinal lymph nodes which are hypermetabolic.  The biopsy from the right iliac bone is consistent with metastatic lobular breast cancer but it is hormone receptor and HER-2 negative and PDL 1 is also negative.    Foundation 1 testing did not reveal any actionable mutations.    She was intolerant to Xeloda and progressed on Taxol and eribulin.      We then switched to recently FDA approved sacituzumab govitecan-hziy (Trodelvy) for TNBC, based on the results of the phase II IMMU-132-01 clinical trial.   She started this on 11/11/2020.      She obtained a second opinion from Dr. Castillo from [facility] who recommended a repeat biopsy to be done to make sure that she really has triple negative breast cancer.      She also met with Dr. Arelis Arshad on 3/18/2021.      After 10 cycles of Trodelvy, she had PET/CT on 6/11/2021 which showed mildly increased uptake in some of the bone lesions while stability in other bone lesions.        After 15 cycles, repeat PET scan showed stable findings.  CA 27-29 has been increasing and it is 1176.      As she was tolerating the chemotherapy well and her quality of life has been decent and scans were stable although she had a rising CA 27-29, we decided on continuing the same chemotherapy because it has been a very slow progression of the disease.    Then she had clear progression of the disease in the bones with increased FDG uptake in both right and left iliac bones and the sternum.    Foundation one showed CDH1 mutation (initially lobular cancer), CCND1 amplification ( equivocal ). FGF3, FGF4, FGF19 amplification ( Equivocal ). Most promising is CCND1 amplification with abemaciclib showing response in 4/10 patients. FGF3,FGF4 and FGF19 are targetable with pan-FGFR inhibitor    As the tumor is diffusely positive for androgen receptor, we decided to start her on androgen receptor blockers.    I initially recommended enzalutamide 160 mg daily ( Enzalutamide for  the Treatment of Androgen Receptor-Expressing Triple-Negative Breast Cancer----J Clin Oncol. 2018 Mar 20; 36(9): 884-890. ).    Eventually we decided to start her on Casodex 150 mg daily instead of Enzalutamide due to cost issues.  Clinical Trial Clin Cancer Res. 2013 Oct 1;19(19):5505-12.   Phase II trial of bicalutamide in patients with androgen receptor-positive, estrogen receptor-negative metastatic Breast Cancer  She started Casodex on 1/6/2022.    She had progressive disease in the bones on the PET scan on 3/31/2022.    We changed to single agent Doxil on 4/14/2022.      She developed significant nausea vomiting and headache so cycle #2 was given on 5/13/2022 with 20% dose reduction which she tolerated well.    Cycle #3 was given on 6/10/2022 with the same dose reduced Doxil.    Repeat PET/CT on 7/1/2022 shows resolution of the FDG avid bony metastasis consistent with treated disease.  No suspicious FDG uptake was seen in the chest abdomen and pelvis.  CA 27-29 was also coming down.     Repeat PET/CT in October 2022 after completing 7 cycles continued to show normal FDG uptake.  CA 15-3 was trending down     Cycle #11 was delayed by 1 week because of chemotherapy-induced neutropenia.  It was received on 2/3/2023.      She received cycle #12 on 3/3/2023 with Onpro.    Repeat PET/CT on 3/27/2023 overall showed very stable findings.    Cycle 13 was given on 3/31/2023 with Onpro support.      Cycle #14 was on 4/28/2023.   CA 15-3 was stable at 115.     Chemotherapy is going well.  We will proceed with cycle #15 on 5/26/2023    Chemotherapy-induced neutropenia.  We are giving her Onpro because of chemotherapy-induced neutropenia.       Bone disease.  She has metastatic disease to the bone.  Previously she got palliative radiation to T12-L5 3000 cGy in 10 fractions from 9/6/2019 till 9/18/2019.  She was evaluated by orthopedics Dr. Bipin Elliott on 11/1/2019 and conservative management was recommended.    She  was on Zometa every 3 months. She last received on 9/29/2020.  Because of progression of the disease in the bones, we switched to Xgeva which she received on 11/11/2020.    She had progression of the disease in the bones so we switched to Doxil but we continued xgeva.  PET scan on 7/1/2022 does not show any suspicious FDG uptake in the bones consistent with treated disease.  Repeat PET/CT on 10/18/2022 also does not show any FDG avid lesions in the skeleton.  Her pain is overall stable and well-controlled.  Buprenorphine buccal film.  She hardly requires as needed morphine.  Repeat PET scan in March 2023 was stable.  She is on Xgeva monthly.  She will continue calcium and vitamin D    Pain management.  Doing very well with buprenorphine buccal film.  She has not required morphine for quite some time.  She will continue to follow with palliative care Dr. Sherman Calderon.  Recently dose of monthly Aimovig injections was increased.  Dose of as needed Amerge was also increased.  To have issues with migraines.  Following with neurology.       Bilateral pulmonary emboli.  Continue indefinite Eliquis for incidentally found PE in the setting of metastatic breast cancer.    We did not address the following today.      Shingles.  The rash has dried out.  She completed well with acyclovir and prednisone.  She takes gabapentin for postherpetic neuralgia and is doing better.        Constipation. Continue senna.    Neuropathy.  This remains mild and stable with neuropathy in her hands.    This is in addition to the chronic balance issues and heat and cold sensitivity from underlying multiple sclerosis which is also stable for years.  Continue to monitor.     Subcutaneous nodule in the scalp.  This looks like an epidermoid cyst.  She thinks it is a stable.  Continue to monitor.       Insomnia.  Continue trazodone.      Wall thickening of esophagus and FDG uptake of fundus of the stomach.  It could be in the setting of  inflammation from recent nausea and vomiting.  Symptoms have resolved.  Continue to monitor clinically.    Vision changes.    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  It was thought that this could be due to Taxol as patient reported to the ophthalmologist that she was on Taxol in September 2019 when her symptoms started.  But she was not on Taxol in September 2019 when she started noticing the visual changes so Taxol is not responsible for this.  The first dose of Taxol was on 11/5/2019.   She had left cataract extraction on 2/8/2021 and she has noticed significant improvement in her vision.  Thinks that her vision is back to her usual.      Increased FDG ability in the colon.  She had FDG uptake in the cecum and ascending colon but no corresponding lesion was seen on the CT scan.  She is completely asymptomatic so at this time we will continue to observe.    Discussion regarding healthcare directives.  On previous visit she told me that she will bring the health care directive for our records but she forgot so I told her to bring it on next visit.      Breast implant removal.  She tells me that she was planning to do breast implant removal because there was a recall on this.  Her surgery was scheduled for 9/24/2019.  Because of this new development of metastatic breast cancer and her recent radiation, surgery has been canceled.  We discussed that at this time treatment for metastatic breast cancer would take precedence over the other surgery as the other surgery would require her to be off chemotherapy for several weeks and in that case the chances of cancer progression would be high and I would not recommend that.    Multiple sclerosis.  She will continue to follow with her neurologist Dr. Quiles.  Currently multiple sclerosis is under control and currently she is not taking any medications.    Return to clinic as scheduled    All of her questions were answered to her  satisfaction.  She is agreeable and comfortable with the plan.    Dewayne Zepeda MD            Virtual Visit Details    Type of service:  Video Visit   Video Start Time: 1:04 PM  Video End Time:1:08 PM    Originating Location (pt. Location): Home  Distant Location (provider location):  Off-site  Platform used for Video Visit: Imtiaz      Again, thank you for allowing me to participate in the care of your patient.        Sincerely,        Dewayne Zepeda MD

## 2023-05-23 NOTE — NURSING NOTE
Is the patient currently in the state of [place]? YES    Visit mode:VIDEO    If the visit is dropped, the patient can be reconnected by: VIDEO VISIT: Text to cell phone: [phone]    Will anyone else be joining the visit? NO      How would you like to obtain your AVS? MyChart    Are changes needed to the allergy or medication list? NO  Patient verified medications and allergies are correct via eCheck-in. Patient confirms no changes at this time and/or since last reviewed by clinic staff.    Reason for visit: RECHECK (Oncology Video Visit Return, breast cancer)  Shaneka Alvarez 62 year old female presents today for f/u on breast cancer.  Eliza Gomez, Virtual Facilitator

## 2023-05-23 NOTE — PROGRESS NOTES
ONCOLOGY FOLLOW UP NOTE: 5/23/23        I took the history from reviewing the previous notes that she was following with Dr. Amaya.  I have copied and updated from prior notes.    ONCOLOGY History:    1.  January 2006:  Diagnosed with Stage IIB, T2 N1 M0 invasive lobular carcinoma of the right breast.  Final pathology showed a 4.5 x 3.8 x 2.5 cm, 01/14 lymph nodes positive.  Estrogen, progesterone receptor positive, HER-2 negative.     2.  Genetics.  BRCA1 and 2 mutations not detected. 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       THERAPY TO DATE:   1.  2006:  ECOG 2104 protocol of dose-dense Adriamycin, Cytoxan and Avastin x4 cycles followed by Taxol and Avastin x4 cycles.   2.  03/2007:  Completed 1 year Avastin.   3.  11/2006-01/2007:  Radiotherapy to the right breast of 5040 cGy.   4.  03/[id number]:  Aromasin.  Stopped after moving to the [facility].   5.  01/2016:  Right modified radical mastectomy with latissimus dorsi flap reconstruction and a left prophylactic mastectomy with latissimus dorsi flap reconstruction.     She recently had MRI of the brain as a follow-up for multiple sclerosis and there were multiple calvarial lesions noted which was suspicious for metastatic disease.  There was no evidence of new multiple sclerosis lesions.  She had a PET scan on 8/30/2019 which showed widespread bone metastatic lesions (calvarium, spine, sacrum, pelvis, ribs most prominent at C7, T3, L1 and L4), hypermetabolic 3 cm lesion in LLL, and mediastinal/hilar LN. CEA wnl at 1.9 and C27-29 elevated to 415 (28 on 8/23/16). A CT guided biopsy of the right iliac bone was obtained on 9/4/19, pathology consistent with metastatic adenocarcinoma (breast), ER/AR negative, HER-2 negative PDL 1 < 1%. A second biopsy was take of the LLL nodule via EBUS on 9/5/19 did not show any malignancy.    C/T/L spine MRI 8/3/19 to 9/2/19 with numerous enhancing lesions of the C, T and L spine, largest around T9 and L1. No evidence of  cord compression. Has a L1 compression fracture and impending L4.     Received radiation T12-L5 3000 cGy in 10 fractions from 9/6/2019 till 9/18/2019.  Neurosurgery recommended no surgical intervention but to wear Gorge brace when out of bed and HOB >30 degrees    She started palliative Xeloda 2000/1500 on 10/1/2019 but after just a few doses she noticed nausea, red eyes blurred vision, loss of appetite.  She stopped taking it after 5 doses and was seen by nurse practitioner on 10/7/2019 when she was improving.  At that point she was restarted on Xeloda at a lower dose of 1000 mg twice a day.  As she was not able to tolerate even the lower dose with extreme nausea and vomiting and feeling extremely fatigued and blurry vision, we decided on stopping Xeloda.    We decided on repeating scans and MRI brain on 10/25/2019 showed no intracranial metastatic disease.   Multiple enhancing calvarial lesions may be increased in number and are suggestive of metastatic disease. Thinner imaging on the current study may identify the smaller lesions and may be responsible for  identified more lesions.   There is a single focus of T2 hyperintense signal within the anterior right temporal lobe may represent interval demyelination. There is no evidence for active demyelination.    PET/CT on 11/1/2019 showed favorable response to therapy and Overall FDG uptake within scattered osseous metastases is decreased since 8/30/2019, particularly within the pelvis. Increased sclerotic appearance of L1 and L4 pathologic compression deformities, likely sequela of recently completed palliative radiation therapy.    No significant FDG uptake in previously demonstrated hypermetabolic mediastinal lymph nodes. Biopsy negative on 9/5/2019.  There is also decreased FDG uptake in the left lower lobe (biopsy negative for  malignancy), now with dense consolidation containing air bronchograms suggestive of infection versus aspiration.  There is diffuse FDG  uptake within the esophagus, consistent with esophagitis.    Because of intolerance to Xeloda we decided on switching to weekly paclitaxel on 11/5/2019.    C#2 Taxol 12/4/19- started with 70mg/m2 dose reduction    C#3 Taxol 12/30/2019     PET/CT on 1/24/2020 showed progression of the disease in some of the bone lesions including increase in size and lytic lesion within the vertebral body of C4 measuring 1 cm as opposed to 0.6 cm previously and a new central soft tissue nodule with SUV max 4.1 when previously it was non-hypermetabolic.  There is also some progression noted in the right proximal femur and right iliac bone.  There is decreased metabolic uptake in the right lamina of T9 with SUV max 4.7 when previously it was 8.0.  Other lesions are stable.    CA 27-29 also had increased significantly and it was 257 on 1/28/2020.    We decided on switching to eribulin on 1/28/2020.    C#2 2/18/2020  C#2 D#8 2/25/2020    C#3 D#1 3/18/2020 -delayed by 1 week as per patient's preference because she wanted to visit her family.    She had a repeat PET/CT on 3/27/2020 which showed stable size of the bone metastatic lesions although the FDG avidity was less, consistent with treatment response.    She had MRI of the thoracic spine on 4/3/2020 and it was compared to the one which was done in August 2019 and it showed increased size of several metastatic lesions most notably at T9 but also at T5-T7.  Other lesions at T10, T11 and T12 appeared improved.    CA 27-29 was also down to 222 on 3/18/2020.    Cycle #4 eribulin ( dose reduced to 1.2 mg/m2 )-4/7/2020-   Cycle #5 Eribulin ( 1.2 mg/m2 )- 4/28/2020   Cycle #6 Eribulin ( 1.2 mg/m2 )- 5/19/2020     Cycle #7 Eribulin ( 1.2 mg/m2 )- 6/9/2020    Cycle #8 Eribulin ( 1.2 mg/m2 )- 6/30/2020     She had a repeat PET scan on 7/17/2020 which showed incidental finding of new acute bilateral pulmonary embolism in the distal left main pulmonary artery extending in the left upper and lower  lobes and in the segmental and branch arteries in the right lower lobe.    It also showed mildly increased FDG avidity in the lytic lesion in the T9 lamina and right pedicle with SUV max 5.3, previously 3.9.  That is also slightly increased FDG uptake to the left anterior fifth rib at the costochondral junction SUV max 3.9, previously 2.5.  There is slightly decreased FDG uptake in the right femoral neck lesion SUV max 2.2, previously 2.9.  FDG uptake in other bone lesions is fairly stable.  No new metastasis are seen.    CA 27-29 was 308 on 6/30/2020.  It was 286 on 5/19/2020.    Overall this is consistent with only slight progression versus stable disease.    She was started on Lovenox.    Cycle #9 eribulin 7/21/2020. CA 27-29 was 238    C#10 eribulin 8/18/2020. ( delayed by one week for ANC 0.9 )    C#11 Eribulin 9/8/2020    C#12 Eribulin 9/29/2020     C#13 Eribulin 10/20/2020     PET scan on 11/6/2020 shows progression of the disease with increased FDG uptake in the lytic lesions in the T9/T10 and right posterolateral elements as well as increased FDG uptake in the previously known hypermetabolic right iliac bone lesion suggesting recurrence of previously treated metastasis.  There is new subtle lesion in the right manubrium.  There is also a new fracture in the anterolateral left fourth rib with associated uptake and this could be a pathologic fracture.  Healing fracture in the left anterior fifth rib lesion.  There is resolution of the left pulmonary emboli.  Decreased FDG uptake of the esophagus consistent with improving inflammation.    CA 27-29 on 10/20/2020 was also elevated at 489.    C#1 Trodelvy on 11/11/2020.  Cycle #2 Trodelvy 12/2/2020  Cycle number 2-day #8 Trodelvy 12/8/2020.    12/15/2020-12/16/2020.  She was admitted to the hospital with nausea vomiting and dehydration.  She had tachycardia and initial lactic acid of 5.  It decreased to 1.4 after 2 L of normal saline.  She also had hypokalemia  and ANC was 1.3.  She was treated with IV fluids.  Procalcitonin was negative.  CTA of the chest was negative for pulmonary embolism or pneumonia.  No bacterial infection was documented.  Her medication which she was initially unable to tolerate at home, were restarted and she was discharged home once started to feel better.      We delayed the start of cycle number 3 x 1 week and decrease the dose of chemotherapy by 25%.  She also had neutropenia with ANC of 1.0.      She started cycle number 3-day #1 on 12/29/2020.  CA 27-29 was 392.    Cycle number 3-day #8 1/5/2021.    C#4 Trodelvy 1/20/2021- 75% dose    2/5/2021.  PET/CT overall shows a good response to treatment with improvement of previously hypermetabolic lesions in the spine and pelvis and stability of other bone meta stasis.  There is a single lytic lesion in the right iliac bone which demonstrates slight Yimi increased FDG uptake and has SUV max 4.7 while previously it was SUV max 3.4.  There was diffuse wall thickening of the esophagus with uptake in the esophagus in the fundus of the stomach and this could be seen with inflammation.    Trodelvy cycle #5 - 2/10/2021.  75% of the dose.  CA 27-29 was 337.    Trodelvy cycle #6 - 3/3/2021.  75% of the dose.  Trodelvy cycle #6, D#8 - 3/10/2021.  75% of the dose.    Trodelvy C#8, D#8 on 4/21/2021  CA 27-29 stable at 371 on 4/14/2021    Trodelvy, cycle #9- 5/5/2021        On 2/25/2020 when she had biopsy of the right acetabulum and it was consistent with metastatic lobular carcinoma.  Again it was Triple Negative.     On 3/18/2020 when she also met with Dr. Arelis Arshad who also advised testing for androgen receptor studies on the biopsy specimen.  Tumor was diffusely androgen receptor positive.      5/26/2021-cycle #10 Trodelvy started    CA 27-29 was 545.    6/11/2021.  PET/CT shows mildly increased uptake in several bone lesions for example in the left anterior iliac crest, mid right iliac crest and left  ischium.  Uptake in other bone lesions are similar to previously.    Because of minimal progression of the disease and she is tolerating chemotherapy very well, we decided on continuing the same chemotherapy.    6/16/2021-Trodelvy cycle #11    7/7/2021 -Trodelvy cycle #12    9/14/2021-Trodelvy-cycle #15, day #8.    9/8/2021-CA 27-29 was more elevated and it was 1176.    PET/CT on 9/24/2021 showed stable findings with no evidence of FDG avid metastatic disease within the body.  Left axillary lymph nodes are prominent and hypermetabolic and likely from recent vaccinations in the left deltoid muscle.  Healed bony metastasis seems stable.      Cycle #16, Trodelvy 9/28/2021.  Cycle #17, day #8 Trodelvy was on 10/26/2021.  Cycle #18, day #8 Trodelvy on 11/22/2021       She saw Dr. Mejia whom on 10/6/2021.  She was not considered eligible for Enfortumab trial.    Cycle #19, Trodelvy on 12/7/2021 12/21/2021.  PET/CT showed progression of bony metastatic disease.  There is increase hypermetabolism in both the right and left iliac bones and the sternum.  Other bone lesions are stable.    There is new scattered areas of groundglass throughout both the lungs and these findings are indeterminate but may represent an infectious etiology.  Interval resolution of left axillary FDG avid lymphadenopathy.    She was started on Casodex 150 mg daily on 1/6/2022.        She was admitted to the hospital from 3/17/2022-3/19/2022 for vomiting and diarrhea and  severe back pain.  I reviewed the discharge summary.  CT lumbar spine showed a stable severe chronic L1 pathologic compression fracture.  Stable mild compression deformity of superior endplate of L3 and superior endplate of L4.  The upper margin of the L4 fracture extends slightly into the spinal canal without high-grade canal stenosis and this is also stable.  Nondisplaced fractures coursing through the L3 pedicles bilaterally were seen which were not clearly seen previously  this could be acute and likely pathologic.  No extraspinal soft tissue abnormality.  Neurosurgery recommended conservative management.  Her pain was controlled and she was discharged home as she felt better with this.      3/31/2022-PET/CT shows progressive bone meta stasis with progressive FDG uptake in the following lesions. Left iliac crest lesion with max SUV of 7.2, previously 3.5. Right mid iliac crest lytic lesion shows maximum SUV of 7.8, previously 6.9.  T10 vertebral body mixed lytic and sclerotic lesion with an SUV of 7.1, previously not hypermetabolic. Thoracic vertebral bodies 8, 7, 5, and 4 also show hypermetabolic lesions were previously there was no hypermetabolism.  Some of the sclerotic osseous lesions are unchanged and do not show increased hypermetabolism compatible treated lesion such as a severe compression deformity at L1 as well as superior compression deformity at L4.  Medial right clavicle sclerotic lesion with SUV max of 4.8, previously not hypermetabolic. There is also right-sided sternal lesions.      4/14/2022--C#1- switch to single agent Doxil 50 mg per metered squared every 4 weeks.    5/13/2022-cycle #2- Doxil- dose reduced to 40 mg per metered square due to severe nausea/vomiting and migraines requiring IV fluids and IV antiemetics.    6/10/2022-cycle #3-Doxil 40 mg per metered square    7/1/2022- PET/CT shows resolution of the hypermetabolic skeletal metastasis.    7/6/2022-cycle #4-Doxil 40 mg per metered square  8/5/2022- cycle #5-Doxil  9/1/2022.  Cycle #6-Doxil  9/30/2022- cycle #7-Doxil    10/18/2022.  PET/CT does not show evidence of any FDG activity    11/2/2022-cycle #8-Doxil.  11/30/2022-cycle #9-Doxil.  12/28/2022-cycle #10-Doxil    2/3/2023- C#11- Doxil (delayed by 1 week because neutrophil count was 0.9)-wanted to give her Onpro but at that time it was not approved by insurance.    3/3/2023.  Cycle #12 Doxil.  Given with Onpro.    Repeat PET/CT on 3/27/2023 overall  showed very stable findings.  PET/CT showed focal FDG avidity in the heart. This is in an expected location of AV node or mitral valve.  This is nonspecific.  We checked an echocardiogram on 4/20/2023 which was unremarkable.  She is asymptomatic.  Continue to monitor clinically.      3/31/2023.  Cycle #13 Doxil.  Given with Onpro.     4/12/2023-she presented to ED with increased migraine headaches and nausea and vomiting.  She was treated symptomatically and was discharged home after she started to feel better.      4/28/2023.  Cycle #14 Doxil.  Given with Onpro    Cycle #15 Doxil with Onpro planned for 5/26/2023      Interval history.  This is a video visit.    She is doing well and tolerating chemotherapy well.  Denies any significant nausea vomiting diarrhea or constipation from chemotherapy.  Pain is under good control.  She has not required morphine for quite some time now.  Continues to take calcium and vitamin D.  No new swellings.  She thinks her vision is back to her normal now.  Migraines are about the same as before.  Neuropathy is the same as before.      ECOG 1    ROS:  Rest of the comprehensive review of the system was negative.        I reviewed other history in epic as below.       PAST MEDICAL HISTORY:     1.  Breast cancer  2.  Multiple sclerosis.   3.  Depression.   4.  Migraines.   5.  Hypertension.   6.  Cholecystectomy and umbilical hernia repair.     SOCIAL HISTORY: She smoked for 5 years but quit many years ago.  Drinks alcohol socially.  She lives with her .  She is a teacher in middle school.       FAMILY HISTORY: She mentions that her mother had breast cancer at 49.  Both grandmothers had breast cancer but she is not sure of the age.  A couple of cousins have cancers.  Patient has 3 children who are healthy.    Current Outpatient Medications   Medication     acetaminophen (TYLENOL) 500 MG tablet     Buprenorphine HCl (BELBUCA) 300 MCG FILM buccal film     busPIRone (BUSPAR) 15 MG  tablet     calcium citrate-vitamin D (CITRACAL) 315-250 MG-UNIT TABS     Cholecalciferol (VITAMIN D3 PO)     ELIQUIS ANTICOAGULANT 5 MG tablet     galcanezumab-gnlm (EMGALITY) 120 MG/ML injection     LORazepam (ATIVAN) 1 MG tablet     metoclopramide (REGLAN) 5 MG tablet     morphine (MSIR) 15 MG IR tablet     OLANZapine (ZYPREXA) 5 MG tablet     ondansetron (ZOFRAN ODT) 4 MG ODT tab     propranolol ER (INDERAL LA) 80 MG 24 hr capsule     traZODone (DESYREL) 50 MG tablet     ubrogepant (UBRELVY) 100 MG tablet     venlafaxine (EFFEXOR XR) 75 MG 24 hr capsule     vitamin B-2 (RIBOFLAVIN) 25 MG TABS tablet     ZOLMitriptan (ZOMIG) 5 MG nasal spray     No current facility-administered medications for this visit.        Allergies   Allergen Reactions     Bactrim [Sulfamethoxazole W/Trimethoprim]      Internal bleeding     Copaxone [Glatiramer] Hives          PHYSICAL EXAMINATION:     There were no vitals taken for this visit.  Wt Readings from Last 4 Encounters:   04/28/23 62.3 kg (137 lb 6.4 oz)   03/31/23 62.4 kg (137 lb 9.6 oz)   03/29/23 61.7 kg (136 lb)   03/12/23 59 kg (130 lb)     Constitutional.  Looks well and in no apparent distress.   Eyes.  Without eye redness or apparent jaundice.   Respiratory.  Non labored breathing. Speaking in full sentences.    Skin.  No concerning skin rashes on the skin visualized.   Neurological.  Is alert and oriented.  Psychiatric.  Mood and affect seem appropriate.      The rest of a comprehensive physical examination is deferred due to Public Health Emergency video visit restrictions.      Labs and Imaging.    Reviewed.    4/28/2023  CBC was unremarkable except WBC 3.7 with ANC 2.3.  Lymphocyte 0.5.    CMP unremarkable except albumin 3.1     CA 15-3 was 115     CA 15-3 was 129 on 3/31/2023      CA 15-3 was 123 on 3/3/2023.      11/30/2022     CA 15-3 was 135.    9/30/2022  CA 15-3 was 159.  CA 27-29 was 1992 on 6/10/2022  CA 27-29 was 3370 on 5/13/2022.  It was 5307 on 4/14/2022  ferritin Doxil was started.      9/28/2021.      Iron studies, ferritin is 101, iron 51, TIBC 268 and iron saturation index 19%.    Echocardiogram 4/20/2023  Left ventricular size, wall motion and function are normal. The ejection  fraction is 60-65%.  Global right ventricular function is normal.  No hemodynamically significant valve abnormalities.  The inferior vena cava is normal.  No pericardial effusion.     This study was compared with the study from 4/7/22. No significant change.      3/27/2023.  PET/CT  1. No new suspicious FDG uptake within the skeleton.  2. Stable numerous non-FDG avid lytic and sclerotic osseous lesions  throughout the axial skeleton.  3. Stable nondisplaced fracture of the left acromion/glenoid, chronic  bilateral healing rib fractures, and compression deformities of the  thoracic and lumbar spine.  4. Hypermetabolic focus in the expected location of the AV node versus  mitral valve, this may represent a normal AV node versus mitral valve  pathology such as vegetations. Recommend follow-up with cardiac  Ultrasound.        11/30/2022-x-ray of the ribs of the left side  There are a few sclerotic lesion seen in the right anterior ribs that were seen on previous PET CTs. These appear to involve the fourth and fifth ribs. There are likely more subtle lesions that were   described on the PET CT scan. Irregularity of the distal end of the left ninth rib may be from metastatic disease or fracture on the oblique view which likely is chronic.       10/18/2022-PET/CT showed no new suspicious FDG uptake within the skeleton.  Stable abnormal FDG avid lytic and sclerotic osseous lesions.  Mild diffuse FDG uptake throughout the large bowel without CT abnormality.    7/1/2022.  PET/CT shows diffuse sclerotic and lytic osseous lesions without any abnormal FDG uptake in the skeleton, consistent with treated disease.  There is evaluation of previous hypermetabolic bone lesions.  Multiple chronic rib  fracture deformities and stable compression fracture deformities of T6, L1 and L4.  No suspicious FDG uptake in the chest abdomen or pelvis.    3/31/2022-PET/CT shows progressive bone metastasis with progressive FDG uptake in the following lesions. Left iliac crest lesion with max SUV of 7.2, previously 3.5. Right mid iliac crest lytic lesion shows maximum SUV of 7.8, previously 6.9.  T10 vertebral body mixed lytic and sclerotic lesion with an SUV of 7.1, previously not hypermetabolic. Thoracic vertebral bodies 8, 7, 5, and 4 also show hypermetabolic lesions were previously there was no hypermetabolism.  Some of the sclerotic osseous lesions are unchanged and do not show increased hypermetabolism compatible treated lesion such as a severe compression deformity at L1 as well as superior compression deformity at L4.  Medial right clavicle sclerotic lesion with SUV max of 4.8, previously not hypermetabolic. There is also right-sided sternal lesions.      Biopsy from the right acetabulum shows metastatic lobular carcinoma.  It is triple negative.  Androgen receptor was diffusely positive (90%, moderate intensity) by immunohistochemistry. )  PD-L1 negative.    Foundation one showed CDH1 mutation (initially lobular cancer), CCND1 amplification ( equivocal ). FGF3, FGF4, FGF19 amplification ( Equivocal ). Most promising is CCND1 amplification with abemaciclib showing response in 4/10 patients. FGF3,FGF4 and FGF19 are targetable with pan-FGFR inhibitors.           ASSESSMENT AND PLAN:     Metastatic breast cancer negative breast cancer (androgen receptor positive ) with extensive involvement of the bones.  There is also a left lower lobe hypermetabolic lesion and mediastinal lymph nodes which are hypermetabolic.  The biopsy from the right iliac bone is consistent with metastatic lobular breast cancer but it is hormone receptor and HER-2 negative and PDL 1 is also negative.    Foundation 1 testing did not reveal any  actionable mutations.    She was intolerant to Xeloda and progressed on Taxol and eribulin.      We then switched to recently FDA approved sacituzumab govitecan-hziy (Trodelvy) for TNBC, based on the results of the phase II IMMU-132-01 clinical trial.   She started this on 11/11/2020.      She obtained a second opinion from Dr. Castillo from [facility] who recommended a repeat biopsy to be done to make sure that she really has triple negative breast cancer.      She also met with Dr. Arelis Arshad on 3/18/2021.      After 10 cycles of Trodelvy, she had PET/CT on 6/11/2021 which showed mildly increased uptake in some of the bone lesions while stability in other bone lesions.        After 15 cycles, repeat PET scan showed stable findings.  CA 27-29 has been increasing and it is 1176.      As she was tolerating the chemotherapy well and her quality of life has been decent and scans were stable although she had a rising CA 27-29, we decided on continuing the same chemotherapy because it has been a very slow progression of the disease.    Then she had clear progression of the disease in the bones with increased FDG uptake in both right and left iliac bones and the sternum.    Foundation one showed CDH1 mutation (initially lobular cancer), CCND1 amplification ( equivocal ). FGF3, FGF4, FGF19 amplification ( Equivocal ). Most promising is CCND1 amplification with abemaciclib showing response in 4/10 patients. FGF3,FGF4 and FGF19 are targetable with pan-FGFR inhibitor    As the tumor is diffusely positive for androgen receptor, we decided to start her on androgen receptor blockers.    I initially recommended enzalutamide 160 mg daily ( Enzalutamide for the Treatment of Androgen Receptor-Expressing Triple-Negative Breast Cancer----J Clin Oncol. 2018 Mar 20; 36(9): 884-890. ).    Eventually we decided to start her on Casodex 150 mg daily instead of Enzalutamide due to cost issues.  Clinical Trial Clin Cancer Res.  2013 Oct 1;19(42):5505-12.   Phase II trial of bicalutamide in patients with androgen receptor-positive, estrogen receptor-negative metastatic Breast Cancer  She started Casodex on 1/6/2022.    She had progressive disease in the bones on the PET scan on 3/31/2022.    We changed to single agent Doxil on 4/14/2022.      She developed significant nausea vomiting and headache so cycle #2 was given on 5/13/2022 with 20% dose reduction which she tolerated well.    Cycle #3 was given on 6/10/2022 with the same dose reduced Doxil.    Repeat PET/CT on 7/1/2022 shows resolution of the FDG avid bony metastasis consistent with treated disease.  No suspicious FDG uptake was seen in the chest abdomen and pelvis.  CA 27-29 was also coming down.     Repeat PET/CT in October 2022 after completing 7 cycles continued to show normal FDG uptake.  CA 15-3 was trending down     Cycle #11 was delayed by 1 week because of chemotherapy-induced neutropenia.  It was received on 2/3/2023.      She received cycle #12 on 3/3/2023 with Onpro.    Repeat PET/CT on 3/27/2023 overall showed very stable findings.    Cycle 13 was given on 3/31/2023 with Onpro support.      Cycle #14 was on 4/28/2023.   CA 15-3 was stable at 115.     Chemotherapy is going well.  We will proceed with cycle #15 on 5/26/2023    Chemotherapy-induced neutropenia.  We are giving her Onpro because of chemotherapy-induced neutropenia.       Bone disease.  She has metastatic disease to the bone.  Previously she got palliative radiation to T12-L5 3000 cGy in 10 fractions from 9/6/2019 till 9/18/2019.  She was evaluated by orthopedics Dr. Bipin Elliott on 11/1/2019 and conservative management was recommended.    She was on Zometa every 3 months. She last received on 9/29/2020.  Because of progression of the disease in the bones, we switched to Xgeva which she received on 11/11/2020.    She had progression of the disease in the bones so we switched to Doxil but we continued  xgeva.  PET scan on 7/1/2022 does not show any suspicious FDG uptake in the bones consistent with treated disease.  Repeat PET/CT on 10/18/2022 also does not show any FDG avid lesions in the skeleton.  Her pain is overall stable and well-controlled.  Buprenorphine buccal film.  She hardly requires as needed morphine.  Repeat PET scan in March 2023 was stable.  She is on Xgeva monthly.  She will continue calcium and vitamin D    Pain management.  Doing very well with buprenorphine buccal film.  She has not required morphine for quite some time.  She will continue to follow with palliative care Dr. Sherman Calderon.  Recently dose of monthly Aimovig injections was increased.  Dose of as needed Amerge was also increased.  To have issues with migraines.  Following with neurology.       Bilateral pulmonary emboli.  Continue indefinite Eliquis for incidentally found PE in the setting of metastatic breast cancer.    We did not address the following today.      Shingles.  The rash has dried out.  She completed well with acyclovir and prednisone.  She takes gabapentin for postherpetic neuralgia and is doing better.        Constipation. Continue senna.    Neuropathy.  This remains mild and stable with neuropathy in her hands.    This is in addition to the chronic balance issues and heat and cold sensitivity from underlying multiple sclerosis which is also stable for years.  Continue to monitor.     Subcutaneous nodule in the scalp.  This looks like an epidermoid cyst.  She thinks it is a stable.  Continue to monitor.       Insomnia.  Continue trazodone.      Wall thickening of esophagus and FDG uptake of fundus of the stomach.  It could be in the setting of inflammation from recent nausea and vomiting.  Symptoms have resolved.  Continue to monitor clinically.    Vision changes.    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  It was thought that this could be due to Taxol as patient  reported to the ophthalmologist that she was on Taxol in September 2019 when her symptoms started.  But she was not on Taxol in September 2019 when she started noticing the visual changes so Taxol is not responsible for this.  The first dose of Taxol was on 11/5/2019.   She had left cataract extraction on 2/8/2021 and she has noticed significant improvement in her vision.  Thinks that her vision is back to her usual.      Increased FDG ability in the colon.  She had FDG uptake in the cecum and ascending colon but no corresponding lesion was seen on the CT scan.  She is completely asymptomatic so at this time we will continue to observe.    Discussion regarding healthcare directives.  On previous visit she told me that she will bring the health care directive for our records but she forgot so I told her to bring it on next visit.      Breast implant removal.  She tells me that she was planning to do breast implant removal because there was a recall on this.  Her surgery was scheduled for 9/24/2019.  Because of this new development of metastatic breast cancer and her recent radiation, surgery has been canceled.  We discussed that at this time treatment for metastatic breast cancer would take precedence over the other surgery as the other surgery would require her to be off chemotherapy for several weeks and in that case the chances of cancer progression would be high and I would not recommend that.    Multiple sclerosis.  She will continue to follow with her neurologist Dr. Quiles.  Currently multiple sclerosis is under control and currently she is not taking any medications.    Return to clinic as scheduled    All of her questions were answered to her satisfaction.  She is agreeable and comfortable with the plan.    Dewayne Zepeda MD

## 2023-05-24 NOTE — NURSING NOTE
Is the patient currently in the state of [place]? YES    Visit mode:VIDEO    If the visit is dropped, the patient can be reconnected by: VIDEO VISIT: Text to cell phone: [phone]    Will anyone else be joining the visit? NO      How would you like to obtain your AVS? MyChart    Are changes needed to the allergy or medication list? NO    Reason for visit: RECHECK

## 2023-05-24 NOTE — TELEPHONE ENCOUNTER
Patient received a denial letter.  Have we received letter.  If so, please put in pt's chart and let me know. Thank you.    Sent to PA team.

## 2023-05-24 NOTE — PATIENT INSTRUCTIONS
Plan: to focus on optimizing migraine prevention -  Send Emgality denial letter for review -Needs reappeal of Emgality denial after review  Need to focus on migraine prevention   Rescue treatment reglan as needed for nausea/vomiting/migraine +benedryl in combination  Zomig nasal and Ubrelvy as needed   May try Relivion, Cefaly as an adjunct to rescue treatment or prevention   Follow up in 3 months after starting new treatment

## 2023-05-24 NOTE — TELEPHONE ENCOUNTER
MEDICATION APPEAL DENIED    Medication:  galcanezumab-gnlm (EMGALITY) 120 MG/ML injection  Insurance Company: [employer] ([facility]) - Phone [phone] Fax [phone]  Denial Date: 5/24/2023  Denial Rational:     Second Level Appeal Information:     Patient Notified: No  Central Prior Authorization Team ONLY: Second level appeals will be managed by the clinic staff and provider. Please contact the [employer] Prior Authorization Team if additional information about the denial is needed.

## 2023-05-24 NOTE — LETTER
"5/24/2023       RE: Shaneka Alvarez  [address] [address] [address]         Dear Colleague,    Thank you for referring your patient, Shaneka Alvarez, to the [facility] [place] [facility]. Please see a copy of my visit note below.    Shaneka is a 62 year old who is being evaluated via a billable video visit.        Subjective   Shaneka is a 62 year old, presenting for the following health issues:headache   RECHECK    Got Aimovig injection -2 weeks ago   Zolmitriptan nasal spray -a second dose helps but takes the edge off.   Ubrelvy 100 mg -takes the edge off   metoclopromide helped  Propranolol 160 mg in divided doses   Emgality denied -patient received a letter of denial     Past Headache Treatments Tried:  DHE and toradol about 2-3 times per week and partially effective  Topiramate 200 mg BID and no side effects but word findings difficulties   for a while and was not effective and made patient \"forgetful\"  Protriptyline (Vivactil) outside provider and was not effective  Sumatriptan nasal and injections and all of the triptans (Maxalt, Relpax) were not effective  Botox injections -tried and not impressive    Was considered for Tysabri and 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  Venlafaxine helpful for anxiety/depression but not headaches  Naproxen as needed   Propranolol 60 mg ER -unsure of the effectiveness  Buspar for anxiety and headaches  Gabapentin-caused \"stomach sickness\"  Chlorpromazine -for nausea and has been helpful  Tizanidine -\"does not remember\"  bystolic  sumavel  Amitriptyline   Depakote  Verapamil  Petadolax 75 mg twice daily and was not effective  Physical therapy in the past and unsure of the effectiveness  Have not tried Cefaly  Aimovig  for a few month and stopped 3 years ago-retried and it did not help        Plan: to focus on optimizing migraine prevention -  Send Emgality denial letter for review -Needs reappeal of " "Emgality denial after review  Need to focus on migraine prevention   Rescue treatment reglan as needed for nausea/vomiting/migraine +benedryl in combination  Zomig nasal and Ubrelvy as needed   May try Relivion, Cefaly as an adjunct to rescue treatment or prevention   Follow up in 3 months after starting new treatment        Objective    Vitals - Patient Reported  Weight (Patient Reported): 59 kg (130 lb)  Height (Patient Reported): 157.5 cm (5' 2\")  BMI (Based on Pt Reported Ht/Wt): 23.78  Pain Score: No Pain (0)    Physical Exam   Patient is alert and no in apparent acute distress,  mentation appears normal, judgement and insight intact, normal speech.    I discussed all my recommendations with Shaneka Alvarez who verbalizes understanding and comfortable with the plan.     21 minutes spent on the date of the encounter doing video access, chart  review,  meds review, treatment plan, documentation and further activities as noted above        Again, thank you for allowing me to participate in the care of your patient.      Sincerely,    GUTIERREZ Dunn CNP      "

## 2023-05-24 NOTE — PROGRESS NOTES
"Shaneka is a 62 year old who is being evaluated via a billable video visit.        Subjective   Shaneka is a 62 year old, presenting for the following health issues:headache   RECHECK    Got Aimovig injection -2 weeks ago   Zolmitriptan nasal spray -a second dose helps but takes the edge off.   Ubrelvy 100 mg -takes the edge off   metoclopromide helped  Propranolol 160 mg in divided doses   Emgality denied -patient received a letter of denial     Past Headache Treatments Tried:  DHE and toradol about 2-3 times per week and partially effective  Topiramate 200 mg BID and no side effects but word findings difficulties   for a while and was not effective and made patient \"forgetful\"  Protriptyline (Vivactil) outside provider and was not effective  Sumatriptan nasal and injections and all of the triptans (Maxalt, Relpax) were not effective  Botox injections -tried and not impressive    Was considered for Tysabri and 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  Venlafaxine helpful for anxiety/depression but not headaches  Naproxen as needed   Propranolol 60 mg ER -unsure of the effectiveness  Buspar for anxiety and headaches  Gabapentin-caused \"stomach sickness\"  Chlorpromazine -for nausea and has been helpful  Tizanidine -\"does not remember\"  bystolic  sumavel  Amitriptyline   Depakote  Verapamil  Petadolax 75 mg twice daily and was not effective  Physical therapy in the past and unsure of the effectiveness  Have not tried Cefaly  Aimovig  for a few month and stopped 3 years ago-retried and it did not help        Plan: to focus on optimizing migraine prevention -  Send Emgality denial letter for review -Needs reappeal of Emgality denial after review  Need to focus on migraine prevention   Rescue treatment reglan as needed for nausea/vomiting/migraine +benedryl in combination  Zomig nasal and Ubrelvy as needed   May try Relivion, Cefaly as an adjunct to rescue treatment or prevention   Follow up in 3 months after starting new " "treatment        Objective    Vitals - Patient Reported  Weight (Patient Reported): 59 kg (130 lb)  Height (Patient Reported): 157.5 cm (5' 2\")  BMI (Based on Pt Reported Ht/Wt): 23.78  Pain Score: No Pain (0)    Physical Exam   Patient is alert and no in apparent acute distress,  mentation appears normal, judgement and insight intact, normal speech.    I discussed all my recommendations with Shaneka Alvarez who verbalizes understanding and comfortable with the plan.     21 minutes spent on the date of the encounter doing video access, chart  review,  meds review, treatment plan, documentation and further activities as noted above    GUTIERREZ Larsen, CNP [facility]  Headache certified  [facility]      Video-Visit Details    Type of service:  Video Visit     Originating Location (pt. Location): Home  Distant Location (provider location):  Off-site  Platform used for Video Visit: Imtiaz    "

## 2023-05-26 NOTE — TELEPHONE ENCOUNTER
Writer called and spoke with patient and scheduled with Dr. Carrillo on 6/2/23 at 9:30am.    Dee Dee Brar LPN

## 2023-05-26 NOTE — PROGRESS NOTES
Infusion Nursing Note:  Shaneka Alvarez presents today for: Doxil.    Patient seen by provider today: No   present during visit today: Not Applicable.    Note: tolerating regimen well.  Most significant symptom is back pain r/t cancer.   Patient remains very active.         Intravenous Access:  Implanted Port.    Treatment Conditions:  Results reviewed, labs MET treatment parameters, ok to proceed with treatment.      Post Infusion Assessment:  Patient tolerated infusion without incident.       Discharge Plan:   6/22 vv with NP and 6/23 for next cycle.      PROSPER MCCRACKEN RN

## 2023-05-26 NOTE — TELEPHONE ENCOUNTER
This encounter is being sent to inform the clinic that this patient has a referral from Dewayne Zepeda MD for the diagnoses of Cyst on back of head and has requested that this patient be seen within 1-2 weeks and/or with any provider.  Based on the availability of our provider(s), we are unable to accommodate this request.      Were all sites offered this patient?  Yes      Does scheduling algorithm request to schedule next available?  Patient has been scheduled for the first available opening with Dr Bowden on 6/23.  We have informed the patient that the clinic will review their referral and reach out if a sooner appointment is medically necessary.

## 2023-05-31 NOTE — LETTER
5/31/2023         RE: Shaneka Alvarez  [id number] [address] [address]        Dear Colleague,    Thank you for referring your patient, Shaneka Alvarez, to the [facility]. Please see a copy of my visit note below.    Palliative Care Outpatient Clinic Progress Note    Patient Name: Shaneka Alvarez is being evaluated via a billable video visit.    Primary Provider:  Clinic, [facility]  Primary Oncologist: Dr Zepeda  Last seen by palliative care: myself, 3/8/23      Chief Complaint: migraines    Background/Summary  Medical:  - breast cancer ER+/NE+, Her2 neg diagnosed in 2006. 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 Didn't tolerate Xeloda, paclitaxel Nov 2019-Jan 2020, treated with eribulin and zometa. PD seen on PET Nov 2020 as well as a new L 4th rib concerning for a pathologic fracture. Started Trodelvy 11/11/20, PD Dec 21, switched to casodex.               - PD again on PET/CT March 2022 including b/l iliac crest lesions, R clavicle, and multifocal T spine involvement. Switched to single agent Doxil April 2022, dose decrease to 80% at 2nd cycle in light of side effects. Tolerated that well. Complete response on scan Oct 2022  - b/l PE found on PET July 2020, on anticoagulation  - MS with mild right-sided weakness    - long-standing history of migraines, follows with neurology. On monthly aimovig with ubrelvy prn  - SI joint pain, working with PT  - post-zoster neuralgia (ED Visit July 2022), completely resolved         Social  She lives with her    6 adult children, 1 lives locally  Middle-, currently not working. She misses it, but also realizes that many days she wouldn't be able to  go to work. 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 This would allow her to spend some time doing work there and maintain control over her schedule.      Plans to go to [place] with a friend for a few days later this month. One of her daughters lives there.     Psychospiritual  Has been through traumatic experiences in the past. Addressed these with counselors prior to her diagnosis. Doing quite well now.      Care Planning   POLST completed 20: DNR/selective treatments     Opioid Safety   Expected prognosis: > 1 year  Risk: Low (ORT 0)  Indication for Opioid Use: Moderate or Strong  Naloxone Script provided if patient also on benzodiazepine: yes  Indications for Tapering reviewed: good dose decrease since on buprenorphine     : reviewed, ok    Interim History:  At the last visit we discussed SI Joint Pain (she had a referral to PT, recommended asap. Discussed that opioids are not an appropriate mainstay or mid/long-term treatment for this kind of pain), migraines (following with neuro), cancer-related pain (well controlled with belbuca).   In the interim she has had worsened back pain, working with PT. On top of this she is facing several life stressors.     Patient is on the call by herself  History gathered today from: patient, medical chart    Her SI joint pain has been responding to physical therapy. She still has episodes of severe lower back pain, especially after times when she is more active. This happens about once a week. She uses a brace with some relief. Icing helps. Hasn't tried ibuprofen yet.    Impression & Recommendations & Counseliny/o woman with breast cancer    SI joint pain: overall improving with PT, still has flares of pain after increased activity  - continue using brace as recommended by PT  - use icing preemptively after activities that tend to trigger flares  - ibuprofen 400mg up to twice weekly    Cancer-related pain: stable on belbuca, no changes  today    Coping: recent family stressors around ill grandchild. Doing okay.   - offered appointment with palliative social work prn    Return to clinic in 3 months    Data / Chart Review:    Review of Systems:   ROS: 10 point ROS neg other than the symptoms noted above in the HPI and pertinents here.         Physical Exam:   Physical Exam:  Vital Signs not checked, virtual visit    CONSTIT: awake, appears comfortable  EENT: MMM, EOMI, no icterus  RESP: reg, nl effort, no cough  SKIN: no rash, no obvious lesions  NEURO: alert, oriented x3  PSYCH: appropriate affect, memory and thought process intact    The rest of a comprehensive physical examination is deferred  due to [facility] emergency video visit restrictions.      Current pertinent medications:  Belbuca 300mg q12h  MSIR 15-30mg q3h prn (last script Sept 2022)              Naloxone prescribed  acetaminophen 1000mg tid  Ibuprofen 600mg q6h prn  CBD cream     Dexamethasone 4mg q12h     Venlafaxine 225mg daily  Lorazepam 1mg q4h prn anxiety, nausea (last script Dec 2020)  Trazodone 50mg HS  Buspirone 15mg bid    Olanzapine prn nausea         No pertinent allergies      Lab and imaging data reviewed:  Comments:         Video-Visit Details    Type of service:  Video Visit    Physician has received verbal consent for a Video Visit from the patient? Yes    Video Start Time: 1005    Video End Time (time video stopped): 1032    Originating Location (pt. Location): Home    Distant Location (provider location):  [facility]   Distant Location (provider location):  On-site    Mode of Communication:  Video Conference via [email or web address]    Alva Whitaker MD  Palliative Medicine      Again, thank you for allowing me to participate in the care of your patient.        Sincerely,        Alva Whitaker MD

## 2023-05-31 NOTE — NURSING NOTE
Is the patient currently in the state of [place]? YES    Visit mode:VIDEO    If the visit is dropped, the patient can be reconnected by: VIDEO VISIT: Text to cell phone: [phone]    Will anyone else be joining the visit? NO      How would you like to obtain your AVS? MyChart    Are changes needed to the allergy or medication list? NO    Reason for visit: ANA BARRAZA, VF

## 2023-05-31 NOTE — PATIENT INSTRUCTIONS
Patient Instructions      Expand All Collapse All    Thank you for attending the Palliative Care Clinic appointment today.     1. SI Joint pain:   - try icing after exertion to pre-empt flares  - you can take ibuprofen 400mg up to twice weekly as needed. Please take with some food (a bite or so, doesn't have to be a meal)    Call us at least 3 workdays in advance if you need a medication refill.    Please have all your pill bottles ready for your next appointment.     Return to clinic in 3 months for a follow up.     You can reach the Palliative Care Team during business hours at the following number:    - [phone]  - or send me a [employer] message    To reach the Palliative Care Provider on-call After-hours or on holidays and weekends, call: [phone].  Please note that we are not able to provide pain medication refills on evenings or weekends.

## 2023-05-31 NOTE — PROGRESS NOTES
Palliative Care Outpatient Clinic Progress Note    Patient Name: Shaneka Alvarez is being evaluated via a billable video visit.    Primary Provider:  [facility], [facility]  Primary Oncologist: Dr Zepeda  Last seen by palliative care: myself, 3/8/23      Chief Complaint: migraines    Background/Summary  Medical:  - breast cancer ER+/MS+, Her2 neg diagnosed in 2006. 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 Didn't tolerate Xeloda, paclitaxel Nov 2019-Jan 2020, treated with eribulin and zometa. PD seen on PET Nov 2020 as well as a new L 4th rib concerning for a pathologic fracture. Started Trodelvy 11/11/20, PD Dec 21, switched to casodex.               - PD again on PET/CT March 2022 including b/l iliac crest lesions, R clavicle, and multifocal T spine involvement. Switched to single agent Doxil April 2022, dose decrease to 80% at 2nd cycle in light of side effects. Tolerated that well. Complete response on scan Oct 2022  - b/l PE found on PET July 2020, on anticoagulation  - MS with mild right-sided weakness    - long-standing history of migraines, follows with neurology. On monthly aimovig with ubrelvy prn  - SI joint pain, working with PT  - post-zoster neuralgia (ED Visit July 2022), completely resolved         Social  She lives with her    6 adult children, 1 lives locally  Middle-, currently not working. She misses it, but also realizes that many days she wouldn't be able to go to work. 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 This would allow her to spend some time doing work there and maintain control over her schedule.      Plans to go to [place] with a friend for a few days later this month. One of her  daughters lives there.     Psychospiritual  Has been through traumatic experiences in the past. Addressed these with counselors prior to her diagnosis. Doing quite well now.      Care Planning   POLST completed 20: DNR/selective treatments     Opioid Safety   Expected prognosis: > 1 year  Risk: Low (ORT 0)  Indication for Opioid Use: Moderate or Strong  Naloxone Script provided if patient also on benzodiazepine: yes  Indications for Tapering reviewed: good dose decrease since on buprenorphine     : reviewed, ok    Interim History:  At the last visit we discussed SI Joint Pain (she had a referral to PT, recommended asap. Discussed that opioids are not an appropriate mainstay or mid/long-term treatment for this kind of pain), migraines (following with neuro), cancer-related pain (well controlled with belbuca).   In the interim she has had worsened back pain, working with PT. On top of this she is facing several life stressors.     Patient is on the call by herself  History gathered today from: patient, medical chart    Her SI joint pain has been responding to physical therapy. She still has episodes of severe lower back pain, especially after times when she is more active. This happens about once a week. She uses a brace with some relief. Icing helps. Hasn't tried ibuprofen yet.    Impression & Recommendations & Counseliny/o woman with breast cancer    SI joint pain: overall improving with PT, still has flares of pain after increased activity  - continue using brace as recommended by PT  - use icing preemptively after activities that tend to trigger flares  - ibuprofen 400mg up to twice weekly    Cancer-related pain: stable on belbuca, no changes today    Coping: recent family stressors around ill grandchild. Doing okay.   - offered appointment with palliative social work prn    Return to clinic in 3 months    Data / Chart Review:    Review of Systems:   ROS: 10 point ROS neg other than the symptoms noted  above in the HPI and pertinents here.         Physical Exam:   Physical Exam:  Vital Signs not checked, virtual visit    CONSTIT: awake, appears comfortable  EENT: MMM, EOMI, no icterus  RESP: reg, nl effort, no cough  SKIN: no rash, no obvious lesions  NEURO: alert, oriented x3  PSYCH: appropriate affect, memory and thought process intact    The rest of a comprehensive physical examination is deferred  due to public health emergency video visit restrictions.      Current pertinent medications:  Belbuca 300mg q12h  MSIR 15-30mg q3h prn (last script Sept 2022)              Naloxone prescribed  acetaminophen 1000mg tid  Ibuprofen 600mg q6h prn  CBD cream     Dexamethasone 4mg q12h     Venlafaxine 225mg daily  Lorazepam 1mg q4h prn anxiety, nausea (last script Dec 2020)  Trazodone 50mg HS  Buspirone 15mg bid    Olanzapine prn nausea         No pertinent allergies      Lab and imaging data reviewed:  Comments:         Video-Visit Details    Type of service:  Video Visit    Physician has received verbal consent for a Video Visit from the patient? Yes    Video Start Time: 1005    Video End Time (time video stopped): 1032    Originating Location (pt. Location): Home    Distant Location (provider location):  [facility]   Distant Location (provider location):  On-site    Mode of Communication:  Video Conference via Sabrina Whitaker MD  Palliative Medicine

## 2023-05-31 NOTE — TELEPHONE ENCOUNTER
PREVISIT INFORMATION                                                    Shaneka RICKY Alvarez scheduled for future visit at [facility] specialty clinics.    Patient is scheduled to see Dr. Carrillo on 6/2/23  Reason for visit: Painful cyst on back of scalp  Referring provider Dewayne Zepeda MD  Has patient seen previous specialist? No  Medical Records:  Available in chart.  Patient was previously seen at a [facility] or [facility] facility.    REVIEW                                                      New patient packet mailed to patient: No  Medication reconciliation complete: No      Current Outpatient Medications   Medication Sig Dispense Refill     acetaminophen (TYLENOL) 500 MG tablet Take 2 tablets (1,000 mg) by mouth 3 times daily (Patient not taking: Reported on 3/31/2023) 180 tablet 0     Buprenorphine HCl (BELBUCA) 300 MCG FILM buccal film Place 1 Film (300 mcg) inside cheek every 12 hours 60 each 0     busPIRone (BUSPAR) 15 MG tablet Take 1 tablet (15 mg) by mouth 2 times daily 180 tablet 0     calcium citrate-vitamin D (CITRACAL) 315-250 MG-UNIT TABS Take 1 tablet by mouth every morning        Cholecalciferol (VITAMIN D3 PO) Take 2,000 Units by mouth every morning        ELIQUIS ANTICOAGULANT 5 MG tablet Take 1 tablet by mouth twice daily 60 tablet 3     galcanezumab-gnlm (EMGALITY) 120 MG/ML injection Inject 240 mg subcutaneous first month and then inject 120 mg subcutaneous every 28 days (Patient not taking: Reported on 5/31/2023) 2 mL 11     LORazepam (ATIVAN) 1 MG tablet Take 1 tablet (1 mg) by mouth every 4 hours as needed for anxiety or nausea (Patient not taking: Reported on 4/25/2023) 30 tablet 0     metoclopramide (REGLAN) 5 MG tablet Take 1-2 tablets (5-10 mg) by mouth every 6 hours as needed (nausea/vomiting/migraine) 20 tablet 5     morphine (MSIR) 15 MG IR tablet Take 1-2 tablets (15-30 mg) by mouth every 3 hours as needed for pain 120 tablet 0     OLANZapine (ZYPREXA)  5 MG tablet Take 2.5-5 mg by mouth 2 times daily as needed for nausea       ondansetron (ZOFRAN ODT) 4 MG ODT tab Take 4 mg by mouth as needed       propranolol ER (INDERAL LA) 80 MG 24 hr capsule Take 1 capsule by mouth twice daily if tolerated 270 capsule 3     traZODone (DESYREL) 50 MG tablet TAKE 1 TABLET BY MOUTH ONCE DAILY AT BEDTIME 60 tablet 0     ubrogepant (UBRELVY) 100 MG tablet Take 1 tablet (100 mg) by mouth at onset of headache (may repeat in 2 hours as needed. Max 200 mg in 24 hours.) 16 tablet 11     venlafaxine (EFFEXOR XR) 75 MG 24 hr capsule Take 3 capsules (225 mg) by mouth daily 270 capsule 3     vitamin B-2 (RIBOFLAVIN) 25 MG TABS tablet        ZOLMitriptan (ZOMIG) 5 MG nasal spray Spray 1 spray in nostril at onset of headache for migraine May repeat in 2 hours. Max 2 sprays/24 hours. (Patient not taking: Reported on 5/31/2023) 12 each 11       Allergies: Bactrim [sulfamethoxazole-trimethoprim] and Copaxone [glatiramer]        PLAN/FOLLOW-UP NEEDED                                                      Previsit review complete.  Patient will see provider at future scheduled appointment.     Patient called for previsit. Appointment date, time and location confirmed.    Patient Reminders Given:  Please, make sure you bring an updated list of your medications.   If you are having a procedure, please, present 15 minutes early.  If you need to cancel or reschedule,please call [phone].    Dee Dee Brar LPN

## 2023-05-31 NOTE — PLAN OF CARE
Goal Outcome Evaluation:    Plan of Care Reviewed With: patient     Overall Patient Progress: improving    Outcome Evaluation: Patient denies pain at this time, she feels like the Lido patch has made a difference. No PRN Morphine overnight.  VSS. She is alert/oriented. Up to the bathroom independently denies dizziness or weakness and relates she knows when to call for help.   Patient: Edenilson Ness  : 1960    Encounter Date: 2023    PROGRESS NOTE    Subjective  Chief complaint: Edenilson Ness is a 62 y.o. male who is a long term care patient being seen and evaluated for weakness    HPI:  Patient working with therapy due to weakness. Denies pain or discomfort. He is walking further distance with walker and SBA to [facility]. No new concerns today.       Objective  Vital signs: 123/76, 98%    Physical Exam  Constitutional:       General: He is not in acute distress.  Eyes:      Extraocular Movements: Extraocular movements intact.   Cardiovascular:      Rate and Rhythm: Normal rate and regular rhythm.   Pulmonary:      Effort: Pulmonary effort is normal.      Breath sounds: Normal breath sounds.   Abdominal:      General: Bowel sounds are normal.      Palpations: Abdomen is soft.   Musculoskeletal:      Cervical back: Neck supple.      Right lower leg: Edema present.      Left lower leg: Edema present.   Neurological:      Mental Status: He is alert.   Psychiatric:         Mood and Affect: Mood normal.         Behavior: Behavior is cooperative.         Assessment/Plan  Problem List Items Addressed This Visit          Other    Weakness     Wheelchair for mobility  Continue Therapy          Medications, treatments, and labs reviewed  Continue medications and treatments as listed in [facility]    Scribe Attestation  By signing my name below, IHeather Scribe   attest that this documentation has been prepared under the direction and in the presence of Alfredo Bro MD.    Provider Attestation - Scribe documentation  All medical record entries made by the Scribe were at my direction and personally dictated by me. I have reviewed the chart and agree that the record accurately reflects my personal performance of the history, physical exam, discussion and plan.    1. Weakness               Electronically Signed By: Alfredo Bro MD   23 11:12 AM

## 2023-06-02 NOTE — PROGRESS NOTES
"PRS    HPI: 62-year-old female with history of metastatic breast cancer undergoing chemotherapy presenting with occipital scalp mass.  Patient noticed this approximately 1 year ago.  It has been stable in size.  It is not symptomatic, except with deep pressure on the scalp.  No overlying skin changes.  No drainage.  She would like to avoid an operation if possible.    ROS: Negative, see HPI  Past medical history: Metastatic breast cancer, history of pulmonary embolus  Past surgical history: None to the scalp  Medications: Eliquis  Allergies: Bactrim  Social history: Non-smoker, denies any tobacco or nicotine use  Family history: No bleeding or clotting problems, issues with anesthesia    Examination:  Ht 1.575 m (5' 2\")   Wt 58.9 kg (129 lb 14.4 oz)   BMI 23.76 kg/m    Nonlabored breathing  Not distressed  2 cm circular subcutaneous scalp mass that is mobile, nontender, firm, but with no overlying alopecia or skin changes    CT imaging reviewed, subcutaneous mass is visible    A/P: 62-year-old female presenting with 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  Since the mass is stable, asymptomatic, and patient is undergoing additional treatment for metastatic breast cancer, it is very reasonable to continue observation.  If there are any changes to the mass, including enlargement, associated skin change, drainage, increasing symptoms, then patient should return for reevaluation.  Patient is agreeable to plan.  -A total of 45 minutes was devoted to review of chart, direct face-to-face patient counseling and documentation during this encounter, exclusive of any procedure performed.    Jose Luis Carrillo MD, PhD    "

## 2023-06-02 NOTE — LETTER
"    6/2/2023         RE: Shaneka Alvarez  [id number] [facility] [address]        Dear Colleague,    Thank you for referring your patient, Shaneka Alvarez, to the [facility]. Please see a copy of my visit note below.    PRS    HPI: 62-year-old female with history of metastatic breast cancer undergoing chemotherapy presenting with occipital scalp mass.  Patient noticed this approximately 1 year ago.  It has been stable in size.  It is not symptomatic, except with deep pressure on the scalp.  No overlying skin changes.  No drainage.  She would like to avoid an operation if possible.    ROS: Negative, see HPI  Past medical history: Metastatic breast cancer, history of pulmonary embolus  Past surgical history: None to the scalp  Medications: Eliquis  Allergies: Bactrim  Social history: Non-smoker, denies any tobacco or nicotine use  Family history: No bleeding or clotting problems, issues with anesthesia    Examination:  Ht 1.575 m (5' 2\")   Wt 58.9 kg (129 lb 14.4 oz)   BMI 23.76 kg/m    Nonlabored breathing  Not distressed  2 cm circular subcutaneous scalp mass that is mobile, nontender, firm, but with no overlying alopecia or skin changes    CT imaging reviewed, subcutaneous mass is visible    A/P: 62-year-old female presenting with 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  Since the mass is stable, asymptomatic, and patient is undergoing additional treatment for metastatic breast cancer, it is very reasonable to continue observation.  If there are any changes to the mass, including enlargement, associated skin change, drainage, increasing symptoms, then patient should return for reevaluation.  Patient is agreeable to plan.  -A total of 45 minutes was devoted to review of chart, direct face-to-face patient counseling and documentation during this encounter, exclusive of any procedure performed.    Jose Luis Carrillo MD, " PhD      Again, thank you for allowing me to participate in the care of your patient.        Sincerely,        Jose Luis Carrillo MD

## 2023-06-02 NOTE — NURSING NOTE
"Shaneka Alvarez's chief complaint for this visit includes:  Chief Complaint   Patient presents with     New Patient     Scalp cyst R/P Dr. Zepeda     PCP: Clinic, [facility]    Referring Provider:  Dewayne Zepeda MD  [address] [place] [id number] 1.575 m (5' 2\")   Wt 58.9 kg (129 lb 14.4 oz)   BMI 23.76 kg/m    Data Unavailable        Allergies   Allergen Reactions     Bactrim [Sulfamethoxazole-Trimethoprim]      Internal bleeding     Copaxone [Glatiramer] Hives         Do you need any medication refills at today's visit? No    Amee guerrero Clinic Visit Facilitator- Surgical Specialties       "

## 2023-06-07 NOTE — TELEPHONE ENCOUNTER
PA needed for: Generic Zomig 5mg solution(zolmitriptan)  Ins only covers Brand Zomig but it is not available in marketplace      Insurance: [facility] commercial  Insur phone: [phone]  Patient ID: [id number]  Please let us know when PA is granted/denied. Thank you!  Brianna Matthews, Pharmacy Tech, [facility] [phone]

## 2023-06-12 NOTE — TELEPHONE ENCOUNTER
Received [employer] message from patient requesting refill of Belbuca.     Last refill: 5/16/2023  Last office visit: 5/31/2023  Scheduled for follow up 9/6/2023     Will route request to MD for review.     Reviewed [place]  Report.

## 2023-06-12 NOTE — TELEPHONE ENCOUNTER
[facility] Call Center    Phone Message    May a detailed message be left on voicemail: yes     Reason for Call: Medication Refill Request    Has the patient contacted the pharmacy for the refill? Yes   Name of medication being requested: ZOLMitriptan (ZOMIG) 5 MG nasal spray  Provider who prescribed the medication: Emelia Mixon APRN, CNP  Pharmacy: [id number] [address] [facility] AT [facility] [address]   Date medication is needed: ASAP - Pt is currently waiting to  her Rx at [facility] because they only have one box left. Please send Rx     Action Taken: Message routed to:  [facility] Neurology    Travel Screening: Not Applicable

## 2023-06-13 NOTE — TELEPHONE ENCOUNTER
PA Initiation    Medication: ZOLMITRIPTAN 5 MG NA SOLN  Insurance Company: [employer] ([facility]) - Phone [phone] Fax [phone]  Pharmacy Filling the Rx: [id number] - [place] - [address] [facility]  Filling Pharmacy Phone: [phone]  Filling Pharmacy Fax:    Start Date: 6/13/2023    Central Prior Authorization Team   Phone: [phone]

## 2023-06-15 NOTE — TELEPHONE ENCOUNTER
PRIOR AUTHORIZATION DENIED    Medication: ZOLMITRIPTAN 5 MG NA SOLN  Insurance Company: [employer] ([facility]) - Phone [phone] Fax [phone]  Denial Date: 6/13/2023  Denial Rational:     Appeal Information:     Patient Notified: Yes

## 2023-06-20 NOTE — NURSING NOTE
"Oncology Rooming Note    June 20, 2023 4:08 PM   Shaneka Alvarez is a 63 year old female who presents for:    Chief Complaint   Patient presents with     Oncology Clinic Visit     Prior to treatment       Initial Vitals: /85 (BP Location: Right arm)   Pulse 64   Temp 97.6  F (36.4  C) (Oral)   Resp 16   Ht 1.575 m (5' 2.01\")   Wt 59.1 kg (130 lb 3.2 oz)   SpO2 97%   BMI 23.81 kg/m   Estimated body mass index is 23.81 kg/m  as calculated from the following:    Height as of this encounter: 1.575 m (5' 2.01\").    Weight as of this encounter: 59.1 kg (130 lb 3.2 oz). Body surface area is 1.61 meters squared.  No Pain (1) Comment: Data Unavailable   No LMP recorded. Patient is postmenopausal.  Allergies reviewed: Yes  Medications reviewed: Yes    Medications: Medication refills not needed today.  Pharmacy name entered into [facility]:    [facility] - [address] PHARMACY [facility] - [place], [place] - [address] [facility] [address] [facility] - [address]  [place] [facility] [place], [place] - [address] [place] [address] DRUG STORE #[id number] - [place] - [address] [facility]    Clinical concerns: left ear lobe injection improved but not completely cleared up.      Yuni Botello LPN            "

## 2023-06-20 NOTE — PROGRESS NOTES
Oncology Follow Up Visit: June 20, 2023     Oncologist: Dr Dewayne Zepeda  PCP: Mohit Barcenas    Diagnosis: Metastatic Breast Cancer to brain and spine  Shaneka Alvarez is a 62 yo female who was diagnosed with Stage IIB, T2 N1 M0 invasive lobular carcinoma of the right breast  In January 2006.  Final pathology showed a 4.5 x 3.8 x 2.5 cm, 01/14 lymph nodes positive.  Estrogen, progesterone receptor positive, HER-2 negative.     Genetics- BRCA1 and 2 mutations not detected. 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  There was no evidence of new multiple sclerosis lesions.  PET scan on 8/30/2019 which showed widespread bone metastatic lesions (calvarium, spine, sacrum, pelvis, ribs most prominent at C7, T3, L1 and L4), hypermetabolic 3 cm lesion in LLL, and mediastinal/hilar LN. CEA wnl at 1.9 and C27-29 elevated to 415 (28 on 8/23/16). A CT guided biopsy of the right iliac bone was obtained on 9/4/19, pathology consistent with metastatic adenocarcinoma (breast), ER/FL negative, HER-2 negative PDL 1 < 1%. A second biopsy was take of the LLL nodule via EBUS on 9/5/19 did not show any malignancy.  C/T/L spine MRI 8/3/19 to 9/2/19 with numerous enhancing lesions of the C, T and L spine, largest around T9 and L1. No evidence of cord compression. Has a L1 compression fracture and impending L4. completed radiation therapy to T12-L5.-Neurosurgery recommended no surgical intervention but to wear [place] brace when out of bed and HOB >30 degrees  Treatment:   2006:  ECOG 2104 protocol of dose-dense Adriamycin, Cytoxan and Avastin x4 cycles followed by Taxol and Avastin x4 cycles.   03/2007:  Completed 1 year Avastin.   11/2006-01/2007:  Radiotherapy to the right breast of 5040 cGy.   03/[id number]:  Aromasin.  Stopped after moving to the [facility].   01/2016:  Right  "modified radical mastectomy with latissimus dorsi flap reconstruction and a left prophylactic mastectomy with latissimus dorsi flap reconstruction.   9/6/2019 till 9/18/2019 Received radiation T12-L5 3000 cGy in 10 fractions   -Neurosurgery recommended no surgical intervention but to wear [place] brace when out of bed and HOB >30 degrees  9/18/2019 completed T12-L5 radiation in 10 fractions =3000 cGy  10/1/2019 Xeloda x 2 trials lowering dose to 1000 mg bid  10/23/2019 Began every 3 month Zometa  11/5/2019- 1/14/2020  weekly paclitaxel x 3 cycles  1/28-10/2020 Eribulin  11/11/2020- 12/2021 Trodelvy  Repeat biopsy done from the right acetabulum on 2/25/2021 which showed metastatic lobular breast cancer.  Hormonal studies, HER-2 FISH again showed that it is triple negative.  Androgen receptor is diffusely positive.    PD-L1 negative.  Foundation 1 testing did not reveal any actionable mutations  1-3/2022  bicalutamide with bony progression  4/2022 began liposomal doxorubicin - dose decrease to 80% at 2nd cycle in light of side effects. Tolerated that well. Complete response on scan Oct 2022    Interval History: Ms. Alvarez comes to clinic for review prior to cycle 31 of Doxil. Pt reports she is tolerating well with no side effects to the plan. \"may be the healthiest dying person you have seen. \"   She has a left ear lobe infection- Augmentin previously taken but did not stop the infection- still has a hard knot noted but no discharge, redness or  Swelling now though..   Migraines- intermittent- none today-  takes 2 doses to improve  Eating well - minimal fatigue - staying active daily with walking and gardening. Bowel and bladder are normal.   No current back pain.   Rest of comprehensive and complete ROS is reviewed and is negative.   Past Medical History:   Diagnosis Date     Breast cancer (H)     Age 44     Cataract     left eye     Chemotherapy induced nausea and vomiting 12/15/2020     Common migraine      " "Esophageal reflux      H/O bilateral mastectomy      Mild major depression (H)      Multiple sclerosis (H)      Pulmonary embolism (H)      Current Outpatient Medications   Medication     amoxicillin-clavulanate (AUGMENTIN) 875-125 MG tablet     Buprenorphine HCl (BELBUCA) 300 MCG FILM buccal film     busPIRone (BUSPAR) 15 MG tablet     calcium citrate-vitamin D (CITRACAL) 315-250 MG-UNIT TABS     Cholecalciferol (VITAMIN D3 PO)     ELIQUIS ANTICOAGULANT 5 MG tablet     erenumab-aooe (AIMOVIG) 140 MG/ML injection     metoclopramide (REGLAN) 5 MG tablet     morphine (MSIR) 15 MG IR tablet     naratriptan (AMERGE) 2.5 MG tablet     OLANZapine (ZYPREXA) 5 MG tablet     ondansetron (ZOFRAN ODT) 4 MG ODT tab     propranolol ER (INDERAL LA) 80 MG 24 hr capsule     traZODone (DESYREL) 50 MG tablet     ubrogepant (UBRELVY) 100 MG tablet     venlafaxine (EFFEXOR XR) 75 MG 24 hr capsule     vitamin B-2 (RIBOFLAVIN) 25 MG TABS tablet     acetaminophen (TYLENOL) 500 MG tablet     galcanezumab-gnlm (EMGALITY) 120 MG/ML injection     LORazepam (ATIVAN) 1 MG tablet     No current facility-administered medications for this visit.     Allergies   Allergen Reactions     Bactrim [Sulfamethoxazole-Trimethoprim]      Internal bleeding     Copaxone [Glatiramer] Hives       Physical Exam: /85 (BP Location: Right arm)   Pulse 64   Temp 97.6  F (36.4  C) (Oral)   Resp 16   Ht 1.575 m (5' 2.01\")   Wt 59.1 kg (130 lb 3.2 oz)   SpO2 97%   BMI 23.81 kg/m     Constitutional: Alert, healthy, and in no distress.   ENT: Eyes grossly normal to inspection.  No discharge or erythema, or obvious scleral/conjunctival abnormalities. No mouth sores  Neck: Supple, No adenopathy.  Cardiac: Heart rate and rhythm is regular with normal S1 and S2 without murmur  Respiratory: Breathing easy. Lung sounds clear to auscultation  Breasts:  Not completed today  Abdomen: Soft, non-tender, BS normal. No masses or organomegaly  MS: Muscle tone normal. " extremities normal with no edema.   Skin: No suspicious lesions or rashes  Neuro: Sensory grossly WNL, gait normal.   Lymph: Normal ant/post cervical, axillary, supraclavicular nodes  Psych: Mentation appears normal and affect normal/bright with good conversation.  Normal speech and appearance well-groomed.    Laboratory Results:   No results found for any visits on 06/20/23. - will be completed prior to infusion this week.     Assessment and Plan:   Metastatic Breast Cancer to brain and spine-Pt continues use of Doxil monthly with good tolerance to plan and no new complaints related to infusions. Labs have been showing good levels that have been meeting goals.   No changes to plan necessary.   Will receive zometa this month.   Will be seen monthly for review  Pt asking about next imaging and shared that she will not need this for at least another 1-2 months with current good tolerance and no new effects noted.   Left ear lobe infection- has already used Augmentin and felt it was not very effective but today see a small knot in the lobe but no redness or discharge. Pt cleaning with alcohol daily. Will continue to follow.   Migraines- recurrent- noted to need Amovig - 2 doses given 2 hours apart to clear headaches. Not changing. No new symptoms of concern for brain metastasis. Migraines followed by PCP.   The total time of this encounter amounted to 40 minutes. This time included face to face time spent with the patient, prep work, ordering tests, and performing post visit documentation.  Annie Bailon,Cnp

## 2023-06-20 NOTE — LETTER
6/20/2023         RE: Shaneka Alvarez  [id number] [facility] [address]        Dear Colleague,    Thank you for referring your patient, Shaneka Alvarez, to the [facility]. Please see a copy of my visit note below.    Oncology Follow Up Visit: June 20, 2023     Oncologist: Dr Dewayne Zepeda  PCP: Mohit Barcenas    Diagnosis: Metastatic Breast Cancer to brain and spine  Shaneka Alvarez is a 62 yo female who was diagnosed with Stage IIB, T2 N1 M0 invasive lobular carcinoma of the right breast  In January 2006.  Final pathology showed a 4.5 x 3.8 x 2.5 cm, 01/14 lymph nodes positive.  Estrogen, progesterone receptor positive, HER-2 negative.     Genetics- BRCA1 and 2 mutations not detected. 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  There was no evidence of new multiple sclerosis lesions.  PET scan on 8/30/2019 which showed widespread bone metastatic lesions (calvarium, spine, sacrum, pelvis, ribs most prominent at C7, T3, L1 and L4), hypermetabolic 3 cm lesion in LLL, and mediastinal/hilar LN. CEA wnl at 1.9 and C27-29 elevated to 415 (28 on 8/23/16). A CT guided biopsy of the right iliac bone was obtained on 9/4/19, pathology consistent with metastatic adenocarcinoma (breast), ER/MN negative, HER-2 negative PDL 1 < 1%. A second biopsy was take of the LLL nodule via EBUS on 9/5/19 did not show any malignancy.  C/T/L spine MRI 8/3/19 to 9/2/19 with numerous enhancing lesions of the C, T and L spine, largest around T9 and L1. No evidence of cord compression. Has a L1 compression fracture and impending L4. completed radiation therapy to T12-L5.-Neurosurgery recommended no surgical intervention but to wear [place] brace when out of bed and HOB >30 degrees  Treatment:   2006:  ECOG 2104 protocol of dose-dense Adriamycin,  "Cytoxan and Avastin x4 cycles followed by Taxol and Avastin x4 cycles.   03/2007:  Completed 1 year Avastin.   11/2006-01/2007:  Radiotherapy to the right breast of 5040 cGy.   03/[id number]:  Aromasin.  Stopped after moving to the [facility].   01/2016:  Right modified radical mastectomy with latissimus dorsi flap reconstruction and a left prophylactic mastectomy with latissimus dorsi flap reconstruction.   9/6/2019 till 9/18/2019 Received radiation T12-L5 3000 cGy in 10 fractions   -Neurosurgery recommended no surgical intervention but to wear [place] brace when out of bed and HOB >30 degrees  9/18/2019 completed T12-L5 radiation in 10 fractions =3000 cGy  10/1/2019 Xeloda x 2 trials lowering dose to 1000 mg bid  10/23/2019 Began every 3 month Zometa  11/5/2019- 1/14/2020  weekly paclitaxel x 3 cycles  1/28-10/2020 Eribulin  11/11/2020- 12/2021 Trodelvy  Repeat biopsy done from the right acetabulum on 2/25/2021 which showed metastatic lobular breast cancer.  Hormonal studies, HER-2 FISH again showed that it is triple negative.  Androgen receptor is diffusely positive.    PD-L1 negative.  Foundation 1 testing did not reveal any actionable mutations  1-3/2022  bicalutamide with bony progression  4/2022 began liposomal doxorubicin - dose decrease to 80% at 2nd cycle in light of side effects. Tolerated that well. Complete response on scan Oct 2022    Interval History: Ms. Alvarez comes to clinic for review prior to cycle 31 of Doxil. Pt reports she is tolerating well with no side effects to the plan. \"may be the healthiest dying person you have seen. \"   She has a left ear lobe infection- Augmentin previously taken but did not stop the infection- still has a hard knot noted but no discharge, redness or  Swelling now though..   Migraines- intermittent- none today-  takes 2 doses to improve  Eating well - minimal fatigue - staying active daily with walking and gardening. Bowel and bladder are normal.   No current back " "pain.   Rest of comprehensive and complete ROS is reviewed and is negative.   Past Medical History:   Diagnosis Date     Breast cancer (H)     Age 44     Cataract     left eye     Chemotherapy induced nausea and vomiting 12/15/2020     Common migraine      Esophageal reflux      H/O bilateral mastectomy      Mild major depression (H)      Multiple sclerosis (H)      Pulmonary embolism (H)      Current Outpatient Medications   Medication     amoxicillin-clavulanate (AUGMENTIN) 875-125 MG tablet     Buprenorphine HCl (BELBUCA) 300 MCG FILM buccal film     busPIRone (BUSPAR) 15 MG tablet     calcium citrate-vitamin D (CITRACAL) 315-250 MG-UNIT TABS     Cholecalciferol (VITAMIN D3 PO)     ELIQUIS ANTICOAGULANT 5 MG tablet     erenumab-aooe (AIMOVIG) 140 MG/ML injection     metoclopramide (REGLAN) 5 MG tablet     morphine (MSIR) 15 MG IR tablet     naratriptan (AMERGE) 2.5 MG tablet     OLANZapine (ZYPREXA) 5 MG tablet     ondansetron (ZOFRAN ODT) 4 MG ODT tab     propranolol ER (INDERAL LA) 80 MG 24 hr capsule     traZODone (DESYREL) 50 MG tablet     ubrogepant (UBRELVY) 100 MG tablet     venlafaxine (EFFEXOR XR) 75 MG 24 hr capsule     vitamin B-2 (RIBOFLAVIN) 25 MG TABS tablet     acetaminophen (TYLENOL) 500 MG tablet     galcanezumab-gnlm (EMGALITY) 120 MG/ML injection     LORazepam (ATIVAN) 1 MG tablet     No current facility-administered medications for this visit.     Allergies   Allergen Reactions     Bactrim [Sulfamethoxazole-Trimethoprim]      Internal bleeding     Copaxone [Glatiramer] Hives       Physical Exam: /85 (BP Location: Right arm)   Pulse 64   Temp 97.6  F (36.4  C) (Oral)   Resp 16   Ht 1.575 m (5' 2.01\")   Wt 59.1 kg (130 lb 3.2 oz)   SpO2 97%   BMI 23.81 kg/m     Constitutional: Alert, healthy, and in no distress.   ENT: Eyes grossly normal to inspection.  No discharge or erythema, or obvious scleral/conjunctival abnormalities. No mouth sores  Neck: Supple, No adenopathy.  Cardiac: " Heart rate and rhythm is regular with normal S1 and S2 without murmur  Respiratory: Breathing easy. Lung sounds clear to auscultation  Breasts:  Not completed today  Abdomen: Soft, non-tender, BS normal. No masses or organomegaly  MS: Muscle tone normal. extremities normal with no edema.   Skin: No suspicious lesions or rashes  Neuro: Sensory grossly WNL, gait normal.   Lymph: Normal ant/post cervical, axillary, supraclavicular nodes  Psych: Mentation appears normal and affect normal/bright with good conversation.  Normal speech and appearance well-groomed.    Laboratory Results:   No results found for any visits on 06/20/23. - will be completed prior to infusion this week.     Assessment and Plan:   Metastatic Breast Cancer to brain and spine-Pt continues use of Doxil monthly with good tolerance to plan and no new complaints related to infusions. Labs have been showing good levels that have been meeting goals.   No changes to plan necessary.   Will receive zometa this month.   Will be seen monthly for review  Pt asking about next imaging and shared that she will not need this for at least another 1-2 months with current good tolerance and no new effects noted.   Left ear lobe infection- has already used Augmentin and felt it was not very effective but today see a small knot in the lobe but no redness or discharge. Pt cleaning with alcohol daily. Will continue to follow.   Migraines- recurrent- noted to need Amovig - 2 doses given 2 hours apart to clear headaches. Not changing. No new symptoms of concern for brain metastasis. Migraines followed by PCP.   The total time of this encounter amounted to 40 minutes. This time included face to face time spent with the patient, prep work, ordering tests, and performing post visit documentation.  Annie Bailon Cnp            Again, thank you for allowing me to participate in the care of your patient.        Sincerely,        Annie Bailon, TAY, APRN CNP

## 2023-06-22 NOTE — TELEPHONE ENCOUNTER
We have done a 2nd level of appeal.  Have we heard from pt's insurance regarding the 2nd level of appeal for  [employer]?  Please update. Thank you.

## 2023-06-23 NOTE — PROGRESS NOTES
"Infusion Nursing Note:  Shaneka Alvarez presents today for C16D1 Doxil + OnPro + Xgeva.    Patient seen by provider today: No   present during visit today: Not Applicable.    Note: Patient expressed she had intermittent episodes yesterday with \"cramping\" to left side of chest-- lasting only about 2 minutes when it occurred. None today, didn't radiate or have any other symptoms. Patient will monitor. No other new medical concerns.      Intravenous Access:  Implanted Port.    Treatment Conditions:  Lab Results   Component Value Date    HGB 13.4 06/23/2023    WBC 3.6 (L) 06/23/2023    ANEU 1.3 (L) 03/03/2023    ANEUTAUTO 2.3 06/23/2023     06/23/2023      Lab Results   Component Value Date     (L) 06/23/2023    POTASSIUM 4.3 06/23/2023    MAG 2.0 03/12/2023    CR 0.70 06/23/2023    RAFAELA 8.9 06/23/2023    BILITOTAL 0.3 06/23/2023    ALBUMIN 4.0 06/23/2023    ALT 14 06/23/2023    AST 32 06/23/2023     Results reviewed, labs MET treatment parameters, ok to proceed with treatment.      ONPRO  Was placed on patient's: right side of abdomen.    Was placed at 12:38 PM    Podpal used: Yes    ONPRO injector device Lot number: [id number]    Patient education included: what patient can expect after application, what colored lights mean on the device, when to remove device, when and where to call with questions or issues, all patients questions answered and that Neulasta administration will occur at 3:38 PM.    Patient tolerated administration well.    Post Infusion Assessment:  Patient tolerated infusion without incident.  Patient tolerated injection well.  Blood return noted pre and post infusion.  Site patent and intact, free from redness, edema or discomfort.  No evidence of extravasations.  Access discontinued per protocol.       Discharge Plan:   AVS to patient via [employer].  Patient will return 7/21/23 for next appointment.   Patient discharged in stable condition accompanied by: self.  Departure Mode: " Ambulatory.      Halima Bullock RN

## 2023-06-26 NOTE — TELEPHONE ENCOUNTER
Pt stated she received an approval letter for [employer].  Have we received an update?  Please put approval/denial letter in chart. Thank you.

## 2023-06-28 NOTE — TELEPHONE ENCOUNTER
Called pt, left message sorry I missed her. I will send her a my chart message and she can respond back to me.

## 2023-07-03 NOTE — ED PROVIDER NOTES
History     Chief Complaint   Patient presents with     Headache     HPI  Shaneka Alvarez is a 63 year old female who has a history of metastatic breast cancer, multiple sclerosis, bony metastasis, pulmonary emboli, hyponatremia amongst other medical problems including migraine headaches presents to the emergency department secondary to a fairly severe constant migraine headache.  Review of records shows that the patient was seen here on April 12 for similar symptoms and was treated with nonnarcotic migraine protocol with good relief.  She states that today is her typical migraine for her.  Is been going on for couple of weeks.  She tried her home medications without relief.  She did try Reglan at home without relief.  The pain is moderate to severe and located over the top of the head and is a pressure type pain.  She has light and sound sensitivity and nausea without vomiting.  She has been messaging with her neurology team who recommended that she come to the ER for relief.  She has an order for an outpatient MRI of the brain to follow-up on her metastasis previously found in the dura.  She does not think that an emergency MRI is necessary today since it is already been ordered for the [facility].    Allergies:  Allergies   Allergen Reactions     Bactrim [Sulfamethoxazole-Trimethoprim]      Internal bleeding     Copaxone [Glatiramer] Hives       Problem List:    Patient Active Problem List    Diagnosis Date Noted     Left-sided low back pain with left-sided sciatica 04/05/2023     Priority: Medium     SI (sacroiliac) joint dysfunction 04/05/2023     Priority: Medium     Chronic bilateral low back pain without sciatica 04/05/2023     Priority: Medium     Elevated serum creatinine 03/18/2022     Priority: Medium     Hyponatremia 03/17/2022     Priority: Medium     Vomiting 03/17/2022     Priority: Medium     Bony metastasis 03/17/2022     Priority: Medium     Acute kidney injury (H) 03/17/2022      Priority: Medium     Sepsis without acute organ dysfunction (H)-possible  03/17/2022     Priority: Medium     Closed fracture of pedicle of lumbar vertebra, initial encounter (H) 03/17/2022     Priority: Medium     Acute midline low back pain, unspecified whether sciatica present 03/17/2022     Priority: Medium     Anemia, unspecified type 10/18/2021     Priority: Medium     Hyperphosphatemia 07/28/2021     Priority: Medium     Drug-induced polyneuropathy (H) 02/18/2021     Priority: Medium     Cancer associated pain 02/03/2021     Priority: Medium     Posterior subcapsular polar age-related cataract of both eyes 01/29/2021     Priority: Medium     Added automatically from request for surgery [id number]       Iris synechiae, bilateral 01/29/2021     Priority: Medium     Added automatically from request for surgery [id number]       Dehydration 12/15/2020     Priority: Medium     Chemotherapy induced nausea and vomiting 12/15/2020     Priority: Medium     Sinus tachycardia 12/15/2020     Priority: Medium     Drug-induced nausea and vomiting 12/15/2020     Priority: Medium     Chemotherapy-induced neutropenia (H) 11/10/2020     Priority: Medium     Pulmonary embolism without acute cor pulmonale, unspecified chronicity, unspecified pulmonary embolism type (H) 08/17/2020     Priority: Medium     Hypokalemia 07/28/2020     Priority: Medium     Bone metastases 10/22/2019     Priority: Medium     Metastatic breast cancer 09/18/2019     Priority: Medium     Metastatic disease (H) 08/31/2019     Priority: Medium     S/P breast reconstruction, bilateral 07/31/2018     Priority: Medium     Anxiety and depression 10/25/2017     Priority: Medium     Hx of breast cancer 10/25/2017     Priority: Medium     Major depressive disorder, recurrent episode, moderate (H) 04/07/2016     Priority: Medium     Anxiety 03/12/2016     Priority: Medium     Migraine without aura and without status migrainosus, not intractable 10/23/2015     Priority:  Medium     Obesity, Class II, BMI 35-39.9 10/23/2015     Priority: Medium     Multiple sclerosis (H) 08/11/2015     Priority: Medium     CARDIOVASCULAR SCREENING; LDL GOAL LESS THAN 160 08/11/2015     Priority: Medium     Raynaud's syndrome 08/11/2015     Priority: Medium     Breast cancer (H) 08/11/2015     Priority: Medium     Age 44       Complication of anesthesia 08/11/2015     Priority: Medium     Arthritis 08/11/2015     Priority: Medium     Autoimmune disorder (H) 08/11/2015     Priority: Medium     Other insomnia 08/11/2015     Priority: Medium     Medication management contract agreement 08/11/2015     Priority: Medium     Ambien 12.5 mg, dispense 30 per month, follow up every 6 months        Neurogenic bladder 12/22/2014     Priority: Medium     Vision changes 12/22/2014     Priority: Medium     Demyelinating disorder (H) 12/22/2014     Priority: Medium     Weakness 11/20/2014     Priority: Medium     GERD (gastroesophageal reflux disease) 10/22/2014     Priority: Medium     History of breast cancer 10/22/2014     Priority: Medium     Overview:   stage 3, diagnosed in 2006 s/p double mastectomy, chemo and radiation.        Migraine 10/22/2014     Priority: Medium        Past Medical History:    Past Medical History:   Diagnosis Date     Breast cancer (H)      Cataract      Chemotherapy induced nausea and vomiting 12/15/2020     Common migraine      Esophageal reflux      H/O bilateral mastectomy      Mild major depression (H)      Multiple sclerosis (H)      Pulmonary embolism (H)        Past Surgical History:    Past Surgical History:   Procedure Laterality Date     CHOLECYSTECTOMY       ENDOBRONCHIAL ULTRASOUND FLEXIBLE N/A 09/05/2019    Procedure: Flexible Bronchoscopy, Endobronchial Ultrasound, Radial Probe;  Surgeon: Sree Sanchez MD;  Location: [facility] OR      REMOVAL OF OVARY/TUBE(S)       HERNIA REPAIR, UMBILICAL       mastectomy  Bilateral 2006     MASTECTOMY, BILATERAL        PHACOEMULSIFICATION CLEAR CORNEA WITH TORIC INTRAOCULAR LENS IMPLANT Left 2021    Procedure: PHACOEMULSIFICATION, COMPLEX CATARACT, WITH INTRAOCULAR LENS IMPLANT, RESIDENT TORIC LENS LEFT;  Surgeon: Luis Barkley MD;  [place] [facility] [place]     PHACOEMULSIFICATION CLEAR CORNEA WITH TORIC INTRAOCULAR LENS IMPLANT Right 3/8/2021    Procedure: PHACOEMULSIFICATION, CATARACT, WITH INTRAOCULAR LENS IMPLANT, TORIC LENS RIGHT;  Surgeon: Luis Barkley MD;  [place] [facility] [place]       Family History:    Family History   Problem Relation Age of Onset     Breast Cancer Maternal Aunt 50         at 85     Breast Cancer Cousin 40     Breast Cancer Mother 49        2nd primary, contralateral breast at 69;  at 86     Melanoma Mother      Breast Cancer Maternal Grandmother 40     Ovarian Cancer Maternal Grandmother      Breast Cancer Paternal Grandmother 50         at 60     Brain Cancer Cousin 20     Breast Cancer Paternal Aunt 50         at 60     Breast Cancer Cousin 45        paternal cousin     Anesthesia Reaction No family hx of      Deep Vein Thrombosis No family hx of        Social History:  Marital Status:   [2]  Social History     Tobacco Use     Smoking status: Former     Types: Cigarettes     Quit date: 2005     Years since quittin.5     Smokeless tobacco: Never   Vaping Use     Vaping Use: Never used   Substance Use Topics     Alcohol use: Not Currently     Alcohol/week: 2.0 standard drinks of alcohol     Types: 1 Glasses of wine, 1 Cans of beer per week     Drug use: No        Medications:    Buprenorphine HCl (BELBUCA) 300 MCG FILM buccal film  busPIRone (BUSPAR) 15 MG tablet  calcium citrate-vitamin D (CITRACAL) 315-250 MG-UNIT TABS  Cholecalciferol (VITAMIN D3 PO)  ELIQUIS ANTICOAGULANT 5 MG tablet  galcanezumab-gnlm (EMGALITY) 120 MG/ML injection  LORazepam (ATIVAN) 1 MG tablet  metoclopramide (REGLAN) 5 MG tablet  morphine (MSIR) 15 MG IR tablet  naratriptan (AMERGE) 2.5  "MG tablet  OLANZapine (ZYPREXA) 5 MG tablet  ondansetron (ZOFRAN ODT) 4 MG ODT tab  propranolol ER (INDERAL LA) 80 MG 24 hr capsule  traZODone (DESYREL) 50 MG tablet  ubrogepant (UBRELVY) 100 MG tablet  venlafaxine (EFFEXOR XR) 75 MG 24 hr capsule  vitamin B-2 (RIBOFLAVIN) 25 MG TABS tablet          Review of Systems   All other systems reviewed and are negative.      Physical Exam   BP: (!) 130/91  Pulse: 70  Temp: 97.9  F (36.6  C)  Resp: 18  Height: 157.5 cm (5' 2\")  Weight: 56.7 kg (125 lb)  SpO2: 100 %      Physical Exam  Vitals and nursing note reviewed.   Constitutional:       General: She is not in acute distress.     Appearance: Normal appearance. She is well-developed.   HENT:      Head: Normocephalic and atraumatic.   Eyes:      General: No scleral icterus.     Conjunctiva/sclera: Conjunctivae normal.   Cardiovascular:      Rate and Rhythm: Normal rate.   Pulmonary:      Effort: Pulmonary effort is normal. No respiratory distress.   Abdominal:      General: Abdomen is flat.   Musculoskeletal:      Cervical back: Normal range of motion and neck supple.   Skin:     General: Skin is warm and dry.      Findings: No rash.   Neurological:      Mental Status: She is alert and oriented to person, place, and time.         ED Course                 Procedures           Results for orders placed or performed during the hospital encounter of 07/03/23 (from the past 24 hour(s))   CBC with platelets differential    Narrative    The following orders were created for panel order CBC with platelets differential.  Procedure                               Abnormality         Status                     ---------                               -----------         ------                     CBC with platelets and d...[[id number]]  Abnormal            Final result                 Please view results for these tests on the individual orders.   Comprehensive metabolic panel   Result Value Ref Range    Sodium 135 (L) 136 - 145 mmol/L "    Potassium 4.1 3.4 - 5.3 mmol/L    Chloride 99 98 - 107 mmol/L    Carbon Dioxide (CO2) 27 22 - 29 mmol/L    Anion Gap 9 7 - 15 mmol/L    Urea Nitrogen 7.8 (L) 8.0 - 23.0 mg/dL    Creatinine 0.60 0.51 - 0.95 mg/dL    Calcium 8.3 (L) 8.8 - 10.2 mg/dL    Glucose 90 70 - 99 mg/dL    Alkaline Phosphatase 101 35 - 104 U/L    AST 22 0 - 45 U/L    ALT 15 0 - 50 U/L    Protein Total 6.0 (L) 6.4 - 8.3 g/dL    Albumin 3.7 3.5 - 5.2 g/dL    Bilirubin Total 0.3 <=1.2 mg/dL    GFR Estimate >90 >60 mL/min/1.73m2   CBC with platelets and differential   Result Value Ref Range    WBC Count 8.4 4.0 - 11.0 10e3/uL    RBC Count 4.02 3.80 - 5.20 10e6/uL    Hemoglobin 11.7 11.7 - 15.7 g/dL    Hematocrit 35.8 35.0 - 47.0 %    MCV 89 78 - 100 fL    MCH 29.1 26.5 - 33.0 pg    MCHC 32.7 31.5 - 36.5 g/dL    RDW 13.9 10.0 - 15.0 %    Platelet Count 153 150 - 450 10e3/uL    % Neutrophils 84 %    % Lymphocytes 8 %    % Monocytes 5 %    % Eosinophils 1 %    % Basophils 1 %    % Immature Granulocytes 1 %    NRBCs per 100 WBC 0 <1 /100    Absolute Neutrophils 7.1 1.6 - 8.3 10e3/uL    Absolute Lymphocytes 0.6 (L) 0.8 - 5.3 10e3/uL    Absolute Monocytes 0.4 0.0 - 1.3 10e3/uL    Absolute Eosinophils 0.1 0.0 - 0.7 10e3/uL    Absolute Basophils 0.1 0.0 - 0.2 10e3/uL    Absolute Immature Granulocytes 0.1 <=0.4 10e3/uL    Absolute NRBCs 0.0 10e3/uL     *Note: Due to a large number of results and/or encounters for the requested time period, some results have not been displayed. A complete set of results can be found in Results Review.       Medications   heparin 100 unit/mL injection 5-10 mL (5 mLs Intracatheter $Given 7/3/23 1804)   0.9% sodium chloride BOLUS (0 mLs Intravenous Stopped 7/3/23 0373)   diphenhydrAMINE (BENADRYL) injection 25 mg (25 mg Intravenous $Given 7/3/23 1605)   prochlorperazine (COMPAZINE) injection 10 mg (10 mg Intravenous $Given 7/3/23 1609)   ketorolac (TORADOL) injection 15 mg (15 mg Intravenous $Given 7/3/23 1608)        Assessments & Plan (with Medical Decision Making)  Migraine headache in a patient with a history of metastatic breast cancer.  It is metastatic to multiple sites but 1 side is the dura.  The patient has an outpatient MRI already ordered.  Migraine cocktail was ordered here with Toradol Compazine Benadryl and IV fluids.  Patient was reevaluated and was feeling better but not completely better.  We given 15 more minutes and then her symptoms had almost completely resolved.  I did give her the option to give IV steroids, Dilaudid, magnesium but she chose to wait and see.  Fortunately everything improved.  She will follow-up for her MRI as an outpatient.  Return to ER precautions and fall precautions discussed.  All questions answered prior to discharge.     I have reviewed the nursing notes.    I have reviewed the findings, diagnosis, plan and need for follow up with the patient.                Discharge Medication List as of 7/3/2023  5:44 PM          Final diagnoses:   Migraine with status migrainosus, not intractable, unspecified migraine type       7/3/2023   [facility] EMERGENCY DEPT     Lb Summers MD  07/03/23 8551

## 2023-07-03 NOTE — ED TRIAGE NOTES
Pt presents with concerns of a migraine.  Pt states that she has had it for a few weeks.  Pt states that she has been working with providers through the [facility] for the migraines.  Pt states that if she stays home in the AC and does not move it is tolerable, but states that she can not live like that.  Her provider stated that she should go to the ED.  Denies nausea or vomiting.       Triage Assessment     Row Name 07/03/23 0842       Triage Assessment (Adult)    Airway WDL WDL       Respiratory WDL    Respiratory WDL WDL       Skin Circulation/Temperature WDL    Skin Circulation/Temperature WDL WDL       Cardiac WDL    Cardiac WDL WDL       Peripheral/Neurovascular WDL    Peripheral Neurovascular WDL WDL       Cognitive/Neuro/Behavioral WDL    Cognitive/Neuro/Behavioral WDL WDL

## 2023-07-03 NOTE — TELEPHONE ENCOUNTER
Patient called with concerns of a mychart message she received about an upcoming appointment on 3/8 with Dr. Barkley.    Patient was advised that 3/8 is her surgery procedure date.  Patient was advised that a time is not there because things tend to get moved or changed on the OR schedule often.     Which is why a nurse will call you a couple days before the surgery procedure to give you further instructions on when to arrive. As well as when to stop eating and drinking.    Patient seemed to be okay with waiting for a nurse to give her further instructions.    Patient was advised to call back if she does not hear from nurse by Friday.           Upon arrival to [facility], patient was to remove bilateral hearing aides for procedure, but only found to have R hearing aide in only. Called up to 4T RN to search for missing hearing aid.    Electronically signed by Marizol Montesinos RN on 7/3/2023 at 1:09 PM

## 2023-07-03 NOTE — MEDICATION SCRIBE - ADMISSION MEDICATION HISTORY
Medication Scribe Admission Medication History    Admission medication history is complete. The information provided in this note is only as accurate as the sources available at the time of the update.    Medication reconciliation/reorder completed by provider prior to medication history? No    Information Source(s): Patient via in-person    Pertinent Information: n/a    Changes made to PTA medication list:    Added: None    Deleted: tylenol and Aimovig    Changed: None    Medication Affordability:  Not including over the counter (OTC) medications, was there a time in the past 3 months when you did not take your medications as prescribed because of cost?: No    Allergies reviewed with patient and updates made in EHR: yes    Medication History Completed By: ALVARO GARRISON 7/3/2023 4:48 PM    Prior to Admission medications    Medication Sig Last Dose Taking? Auth Provider Long Term End Date   Buprenorphine HCl (BELBUCA) 300 MCG FILM buccal film Place 1 Film (300 mcg) inside cheek every 12 hours 7/3/2023 at am Yes Alva Whitaker MD     busPIRone (BUSPAR) 15 MG tablet Take 1 tablet (15 mg) by mouth 2 times daily 7/3/2023 at am Yes Spenser Landeros PA-C Yes    calcium citrate-vitamin D (CITRACAL) 315-250 MG-UNIT TABS Take 1 tablet by mouth every morning  7/3/2023 at am Yes Reported, Patient     Cholecalciferol (VITAMIN D3 PO) Take 2,000 Units by mouth every morning  7/3/2023 at am Yes Reported, Patient     ELIQUIS ANTICOAGULANT 5 MG tablet Take 1 tablet by mouth twice daily 7/3/2023 at am Yes Annie Bailon APRN CNP     galcanezumab-gnlm (EMGALITY) 120 MG/ML injection Inject 240 mg subcutaneous first month and then inject 120 mg subcutaneous every 28 days  at not started Yes Emelia Mixon APRN CNP     LORazepam (ATIVAN) 1 MG tablet Take 1 tablet (1 mg) by mouth every 4 hours as needed for anxiety or nausea More than a month at unkn Yes Tina Howell CNP     metoclopramide (REGLAN)  5 MG tablet Take 1-2 tablets (5-10 mg) by mouth every 6 hours as needed (nausea/vomiting/migraine) 7/2/2023 at unkn Yes Emelia Mixon APRN CNP     morphine (MSIR) 15 MG IR tablet Take 1-2 tablets (15-30 mg) by mouth every 3 hours as needed for pain More than a month at unkn Yes Annie Bailon APRN CNP     naratriptan (AMERGE) 2.5 MG tablet Take 1 tablet (2.5 mg) by mouth at onset of headache for migraine May repeat in 4 hours. Max 2 tablets/24 hours. Past Week at unkn Yes Emelia Mixon APRN CNP     OLANZapine (ZYPREXA) 5 MG tablet Take 2.5-5 mg by mouth 2 times daily as needed for nausea Past Week at unkn Yes Reported, Patient No    ondansetron (ZOFRAN ODT) 4 MG ODT tab Take 4 mg by mouth as needed 7/2/2023 at unkn Yes Reported, Patient     propranolol ER (INDERAL LA) 80 MG 24 hr capsule Take 1 capsule by mouth twice daily if tolerated 7/3/2023 at am Yes Emelia Mixon APRN CNP Yes    traZODone (DESYREL) 50 MG tablet TAKE 1 TABLET BY MOUTH ONCE DAILY AT BEDTIME  Patient taking differently: Take 50 mg by mouth At Bedtime 7/2/2023 at hs Yes Annie Bailon APRN CNP Yes    ubrogepant (UBRELVY) 100 MG tablet Take 1 tablet (100 mg) by mouth at onset of headache (may repeat in 2 hours as needed. Max 200 mg in 24 hours.) 7/2/2023 at unkn Yes Emelia Mixon APRN CNP     venlafaxine (EFFEXOR XR) 75 MG 24 hr capsule Take 3 capsules (225 mg) by mouth daily 7/3/2023 at am Yes Spenser Landeros PA-C Yes    vitamin B-2 (RIBOFLAVIN) 25 MG TABS tablet  7/3/2023 at am Yes Reported, Patient     capecitabine (XELODA) 500 MG tablet CHEMO Take 4 tabs ( 2000mg ) in the morning and 3 tabs (1500mg ) in the evening. Days 1 through 14, then off for 7 days.  Patient not taking: Reported on 10/7/2019   Dewayne Zepeda MD Yes 10/23/19

## 2023-07-03 NOTE — DISCHARGE INSTRUCTIONS
Please return to the ER if new or worsening symptoms for re-evaluation.  I am glad you are feeling better.  Follow-up for the MRI as discussed with your outpatient team.

## 2023-07-05 NOTE — TELEPHONE ENCOUNTER
Called pt and let her know that I received a note from our PA team and they have not heard back regarding her Emgality. Pt stated she received a call from her pharmacy Emgality has been approved and it waiting for .  Informed pt to send me a my chart message if she was able to  the Emgality.  Pt stated she took her other injectable on June 19 so she would have to wait until July 19 to take the Emgality.

## 2023-07-05 NOTE — TELEPHONE ENCOUNTER
Pt stated the Naratriptan is not working for her and has taken it 6 times.  She stated she will continue taking it until she knows next step.    Note: she was in ER on 7/3 due to migraine.

## 2023-07-05 NOTE — TELEPHONE ENCOUNTER
I reviewed ED note. Unfortunately they did not do any imaging. I don't see brain MRI was scheduled -when and where brain MRI is scheduled?   Needs post ED follow up to review her meds and needs oncology input-this is persistent pain with a temporally relief but returns back in a full strengths.. I'm concerned this persistent pain and mets to brain are very worrisome in my opinion.   Fawn please ask patient to complete MRI ASAP and see if any spots we can add her to for follow up  Would recommend to go back to ED if headache symptoms worsen or any new symptoms while awaiting outpatient follow up visit. We need to break her pain cycle and may need to be admitted -will talk to her oncology team  Should stop naratriptan -risk of side effects and not helping.

## 2023-07-06 NOTE — TELEPHONE ENCOUNTER
Fawn please call patient to schedule brain MRI ASAP -I changed brain MRI order to stat. Please help with getting her in for brain MRI soon.  Should stop naratriptan -long use may cause significant side effects and it sounds ineffective any way  Watch for any openings in my schedule to add her on to review images and headache treatment   If headache unbearable or any new symptoms she should  return to ED.

## 2023-07-06 NOTE — TELEPHONE ENCOUNTER
Called pt and informed her Emelia's note for pt to schedule brain MRI ASAP -I changed brain MRI order to stat. Pt stated she will call imaging scheduling to see if she can get an appt today as she has an appt tomorrow. Tirso Kapoor informed her to stop naratriptan -long use may cause significant side effects and it sounds ineffective any way  Informed pt to send me a my chart message and let me know when her MRI is scheduled so we can add her to Emelia's schedule.      Tirso Kapoor informed her If headache is unbearable or any new symptoms return to ED at a hospital and she verbally understood.      Pt requested Lorazepam to be sent to [facility] to get her through the MRI and she has taken this before.

## 2023-07-06 NOTE — TELEPHONE ENCOUNTER
Per Emelia, called pt and informed her the plan which includes 3 things:  1. Schedule the MRI for today as Emelia ordered it STAT  2.  Emelia is not able to have a virtual visit with you tomorrow.  We will have you on her quick call list for an appt with Emelia for next week.  3.  If an worsening of HA or if any neurological changes- vision, weakness, numnbess, etc go to the emergency dept at a hospital.    Pt verbalized understanding of the plan

## 2023-07-07 NOTE — TELEPHONE ENCOUNTER
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  Venlafaxine helpful for anxiety/depression but not headaches  Naproxen as needed   Propranolol 60 mg ER -unsure of the effectiveness  Buspar for anxiety and headaches  Gabapentin-caused \"stomach sickness\"  Chlorpromazine -for nausea and has been helpful  Tizanidine -\"does not remember\"  bystolic  sumavel  Amitriptyline   Depakote  Verapamil  Petadolax 75 mg twice daily and was not effective  Physical therapy in the past and unsure of the effectiveness  Have not tried Cefaly  Aimovig  for a few month and stopped 3 years ago-retried and it did not help   Rationale:  migraine     Insurance Name: [employer]  Insurance ID:  [id number]         Pharmacy Information (if different than what is on RX)  Name:   Phone:    "

## 2023-07-07 NOTE — RESULT ENCOUNTER NOTE
Justo Munroe,  Your brain MRI images reviewed and everything seems unchanged -no new findings to explain your persistent headaches. I sent you Cash[employer] message with headache treatment options-please review it and let me know what do you think.   I'm cc'ing on your brain MRI results your oncology provider.   I'm trying to find an opening to see you at least virtually to see what else we can do to help you with headaches. I hope to see you next week.   Take care,  Emelia

## 2023-07-10 NOTE — PROGRESS NOTES
"    Ajit Munroe is a 63 year old, presenting for the following health issues:  RECHECK (Return headache )  Post ED follow up-note reviewed. 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 Motion sickness even in infancy patient states that her mother would tell her.     Updates -No migraine today.   Yesterday took ubrelvy once and repeated dose an it works. Usually it works when repeats the dose.   Reglan and benadryl work  Toradol works     Brain MRI from 7/6/2023 -stable reviewed results with patient  IMPRESSION:  1.  No acute ischemia, mass/mass effect, or abnormal intracranial enhancement.   2.  Similar sequela from demyelinating disease.  3.  Osseous metastases, as before.       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  Venlafaxine helpful for anxiety/depression but not headaches  Naproxen as needed   Propranolol 60 mg ER -unsure of the effectiveness  Buspar for anxiety and headaches  Gabapentin-caused \"stomach sickness\"  Chlorpromazine -for nausea and has been helpful  Tizanidine -\"does " "not remember\"  bystolic  sumavel  Amitriptyline   Depakote  Verapamil  Petadolax 75 mg twice daily and was not effective  Physical therapy in the past and unsure of the effectiveness  Cefaly  Aimovig         Headache treatment Plan:  Aimovig change to Emgality -will inject it on 7/19  Increase propranolol to 240 mg in divided doses if tolerated -monitor blood pressure and heart   Rescue treatment -Ubrelvy as needed   A trial of zavzpret -as discussed and see link in AVS.   Metoclopramide 5-10 mg every 6 hours as needed and benadryl 25-50 mg every 6 hours as needed for migraine  +toradol one as needed IM at home for a severe migraine. May interact with Eliquis and caused bleeding and renal side effects. Patient excepts the risk  May try olanzapine 2.5 mg as needed once for a severe migraine.    Stay hydrate and talk to oncology about extra fluid with and after chemotherapy  Follow up in 3 months or sooner if needed       Objective    /83 (BP Location: Right arm, Patient Position: Sitting, Cuff Size: Adult Regular)   Pulse 61   Wt 56.7 kg (125 lb)   SpO2 100%   BMI 22.86 kg/m    Body mass index is 22.86 kg/m .  Physical Exam   GENERAL: healthy, alert and no distress  EYES: Eyes grossly normal to inspection  NEURO: Normal strength and tone, mentation intact and speech normal  PSYCH: mentation appears normal, affect normal    I discussed all my recommendations with Shaneka Alvarez who verbalizes understanding and comfortable with the plan.      35 minutes spent on the date of the encounter doing face to face visit, chart  review, exam, results review,  meds review, treatment plan, documentation and further activities as noted above    GUTIERREZ Larsen, CNP [facility]  Headache certified  [facility]      "

## 2023-07-10 NOTE — LETTER
"7/10/2023       RE: Shaneka Alvarez  [address] [facility] [address]       Dear Colleague,    Thank you for referring your patient, Shaneka Alvarez, to the [facility] [place] [facility] at [facility]. Please see a copy of my visit note below.        Ajit Munroe is a 63 year old, presenting for the following health issues:  RECHECK (Return headache )  Post ED follow up-note reviewed. 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 Motion sickness even in infancy patient states that her mother would tell her.     Updates -No migraine today.   Yesterday took ubrelvy once and repeated dose an it works. Usually it works when repeats the dose.   Reglan and benadryl work  Toradol works     Brain MRI from 7/6/2023 -stable reviewed results with patient  IMPRESSION:  1.  No acute ischemia, mass/mass effect, or abnormal intracranial enhancement.   2.  Similar sequela from demyelinating disease.  3.  Osseous metastases, as before.       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  Venlafaxine helpful for anxiety/depression but not headaches  Naproxen as needed   Propranolol 60 mg ER -unsure of the effectiveness  Buspar for anxiety and headaches  Gabapentin-caused \"stomach sickness\"  Chlorpromazine -for nausea and has been helpful  Tizanidine -\"does not remember\"  bystolic  sumavel  Amitriptyline   Depakote  Verapamil  Petadolax 75 mg twice daily and was not effective  Physical therapy in the past and unsure of the effectiveness  Cefaly  Aimovig         Headache treatment Plan:  Aimovig change to Emgality -will inject it on 7/19  Increase propranolol to 240 mg in divided doses if tolerated -monitor blood pressure and heart   Rescue treatment -Ubrelvy as needed   A trial of zavzpret -as discussed and see link in AVS.   Metoclopramide 5-10 mg every 6 hours as needed and benadryl 25-50 mg every 6 hours as needed for migraine  +toradol one as needed IM at home for a severe migraine. May interact with Eliquis and caused bleeding and renal side effects. Patient excepts the risk  May try olanzapine 2.5 mg as needed once for a severe migraine.    Stay hydrate and talk to oncology about extra fluid with and after chemotherapy  Follow up in 3 months or sooner if needed       Objective    /83 (BP Location: Right arm, Patient Position: Sitting, Cuff Size: Adult Regular)   Pulse 61   Wt 56.7 kg (125 lb)   SpO2 100%   BMI 22.86 kg/m    Body mass index is 22.86 kg/m .  Physical Exam   GENERAL: healthy, alert and no distress  EYES: Eyes grossly normal to inspection  NEURO: Normal strength and tone, mentation intact and speech normal  PSYCH: mentation appears normal, affect normal    I discussed all my recommendations with Shaneka Alvarez who verbalizes understanding and comfortable with the plan.      35 minutes spent on the date of the encounter doing face to face visit, chart  review, exam, results review,  meds review, treatment plan, documentation " and further activities as noted above            Again, thank you for allowing me to participate in the care of your patient.      Sincerely,    GUTIERREZ Dunn CNP

## 2023-07-10 NOTE — PATIENT INSTRUCTIONS
Plan:  Change to Emgality -will inject it on 7/19  Increase propranolol to 240 mg in divided doses if tolerated -monitor blood pressure and heart   Rescue treatment -Ubrelvy as needed   A trial of zavzpret -as discussed and see link in AVS.   Metoclopramide 5-10 mg every 6 hours as needed and benadryl 25-50 mg every 6 hours as needed for migraine  +toradol one as needed IM at home for a severe migraine. May interact with Eliquis and caused bleeding and renal side effects. Patient excepts the risk  May try olanzapine 2.5 mg as needed once for a severe migraine.    Stay hydrate and talk to oncology about extra fluid with and after chemotherapy  Follow up in 3 months or sooner if needed   A trial of zavzpret -side effects-allergic reaction, nausea, nasal discomfort/irritation  [email or web address]

## 2023-07-12 NOTE — TELEPHONE ENCOUNTER
PRIOR AUTHORIZATION DENIED    Medication: ZAVZPRET 10 MG/ACT NA SOLN  Insurance Company: [employer] ([facility]) - Phone [phone] Fax [phone]  Denial Date: 7/12/2023  Denial Rational: MEDICATION IS NOT COVERED BY PATIENT'S RX BENEFITS AND EXCLUDED FROM COVERAGE      Appeal Information: IF PROVIDER WOULD LIKE TO APPEAL THIS DECISION PLEASE PROVIDE PA TEAM WITH LETTER OF MEDICAL NECESSITY      Patient Notified: No

## 2023-07-12 NOTE — TELEPHONE ENCOUNTER
Central Prior Authorization Team   Phone: [phone]    PA Initiation    Medication: ZAVZPRET 10 MG/ACT NA SOLN  Insurance Company: [employer] ([facility]) - Phone [phone] Fax [phone]  Pharmacy Filling the Rx: [facility] [address] [place], [place] - [address] [facility]  Filling Pharmacy Phone: [phone]  Filling Pharmacy Fax:    Start Date: 7/12/2023

## 2023-07-13 NOTE — TELEPHONE ENCOUNTER
Received [employer] message from patient requesting refill of Belbuca.     Last refill: 6/12/2023  Last office visit: 5/31/2023  Scheduled for follow up 9/6/2023     Will route request to MD for review.     Reviewed [place]  Report.

## 2023-07-13 NOTE — TELEPHONE ENCOUNTER
Medication Appeal Initiation    We have initiated an appeal for the requested medication:  Medication: ZAVZPRET 10 MG/ACT NA SOLN  Appeal Start Date:  7/13/2023  Insurance Company: [employer]  Insurance Phone:   Insurance Fax: [phone]  Comments:       Faxed LMN and chart notes to insurance -

## 2023-07-18 NOTE — PATIENT INSTRUCTIONS
Chemo as scheduled.    Schedule PET/CT around 8/8/2023.    See me 8/10/2023 as video visit.    Schedule chemo 8/18/2023

## 2023-07-18 NOTE — NURSING NOTE
Is the patient currently in the state of [place]? YES    Visit mode:VIDEO    If the visit is dropped, the patient can be reconnected by: VIDEO VISIT: Text to cell phone: [phone]    Will anyone else be joining the visit? NO      How would you like to obtain your AVS? MyChart    Are changes needed to the allergy or medication list? NO    Reason for visit: RECHECK (Video visit )  Toyin Joseph

## 2023-07-18 NOTE — LETTER
7/18/2023         RE: Shaneka Alvarez  [id number] [facility] [address]        Dear Colleague,    Thank you for referring your patient, Shaneak Alvarez, to the [facility]. Please see a copy of my visit note below.    Virtual Visit Details    Type of service:  Video Visit     Originating Location (pt. Location): Home  Distant Location (provider location):  Off-site  Platform used for Video Visit: Defixo   Video start time. 2:20 PM  Video stop time. 2:26 PM     ONCOLOGY FOLLOW UP NOTE: 7/18/23        I took the history from reviewing the previous notes that she was following with Dr. Amaya.  I have copied and updated from prior notes.    ONCOLOGY History:    1.  January 2006:  Diagnosed with Stage IIB, T2 N1 M0 invasive lobular carcinoma of the right breast.  Final pathology showed a 4.5 x 3.8 x 2.5 cm, 01/14 lymph nodes positive.  Estrogen, progesterone receptor positive, HER-2 negative.     2.  Genetics.  BRCA1 and 2 mutations not detected. 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       THERAPY TO DATE:   1. 2006:  ECOG 2104 protocol of dose-dense Adriamycin, Cytoxan and Avastin x4 cycles followed by Taxol and Avastin x4 cycles.   2.  03/2007:  Completed 1 year Avastin.   3.  11/2006-01/2007:  Radiotherapy to the right breast of 5040 cGy.   4.  03/[id number]:  Aromasin.  Stopped after moving to the [facility].   5.  01/2016:  Right modified radical mastectomy with latissimus dorsi flap reconstruction and a left prophylactic mastectomy with latissimus dorsi flap reconstruction.     She recently had MRI of the brain as a follow-up for multiple sclerosis and there were multiple calvarial lesions noted which was suspicious for metastatic disease.  There was no evidence of new multiple sclerosis lesions.  She had a PET scan on 8/30/2019 which showed widespread bone metastatic lesions (calvarium, spine, sacrum, pelvis, ribs most prominent at C7, T3, L1 and L4),  hypermetabolic 3 cm lesion in LLL, and mediastinal/hilar LN. CEA wnl at 1.9 and C27-29 elevated to 415 (28 on 8/23/16). A CT guided biopsy of the right iliac bone was obtained on 9/4/19, pathology consistent with metastatic adenocarcinoma (breast), ER/IN negative, HER-2 negative PDL 1 < 1%. A second biopsy was take of the LLL nodule via EBUS on 9/5/19 did not show any malignancy.    C/T/L spine MRI 8/3/19 to 9/2/19 with numerous enhancing lesions of the C, T and L spine, largest around T9 and L1. No evidence of cord compression. Has a L1 compression fracture and impending L4.     Received radiation T12-L5 3000 cGy in 10 fractions from 9/6/2019 till 9/18/2019.  Neurosurgery recommended no surgical intervention but to wear Gorge brace when out of bed and HOB >30 degrees    She started palliative Xeloda 2000/1500 on 10/1/2019 but after just a few doses she noticed nausea, red eyes blurred vision, loss of appetite.  She stopped taking it after 5 doses and was seen by nurse practitioner on 10/7/2019 when she was improving.  At that point she was restarted on Xeloda at a lower dose of 1000 mg twice a day.  As she was not able to tolerate even the lower dose with extreme nausea and vomiting and feeling extremely fatigued and blurry vision, we decided on stopping Xeloda.    We decided on repeating scans and MRI brain on 10/25/2019 showed no intracranial metastatic disease.   Multiple enhancing calvarial lesions may be increased in number and are suggestive of metastatic disease. Thinner imaging on the current study may identify the smaller lesions and may be responsible for  identified more lesions.   There is a single focus of T2 hyperintense signal within the anterior right temporal lobe may represent interval demyelination. There is no evidence for active demyelination.    PET/CT on 11/1/2019 showed favorable response to therapy and Overall FDG uptake within scattered osseous metastases is decreased since 8/30/2019,  particularly within the pelvis. Increased sclerotic appearance of L1 and L4 pathologic compression deformities, likely sequela of recently completed palliative radiation therapy.    No significant FDG uptake in previously demonstrated hypermetabolic mediastinal lymph nodes. Biopsy negative on 9/5/2019.  There is also decreased FDG uptake in the left lower lobe (biopsy negative for  malignancy), now with dense consolidation containing air bronchograms suggestive of infection versus aspiration.  There is diffuse FDG uptake within the esophagus, consistent with esophagitis.    Because of intolerance to Xeloda we decided on switching to weekly paclitaxel on 11/5/2019.    C#2 Taxol 12/4/19- started with 70mg/m2 dose reduction    C#3 Taxol 12/30/2019     PET/CT on 1/24/2020 showed progression of the disease in some of the bone lesions including increase in size and lytic lesion within the vertebral body of C4 measuring 1 cm as opposed to 0.6 cm previously and a new central soft tissue nodule with SUV max 4.1 when previously it was non-hypermetabolic.  There is also some progression noted in the right proximal femur and right iliac bone.  There is decreased metabolic uptake in the right lamina of T9 with SUV max 4.7 when previously it was 8.0.  Other lesions are stable.    CA 27-29 also had increased significantly and it was 257 on 1/28/2020.    We decided on switching to eribulin on 1/28/2020.    C#2 2/18/2020  C#2 D#8 2/25/2020    C#3 D#1 3/18/2020 -delayed by 1 week as per patient's preference because she wanted to visit her family.    She had a repeat PET/CT on 3/27/2020 which showed stable size of the bone metastatic lesions although the FDG avidity was less, consistent with treatment response.    She had MRI of the thoracic spine on 4/3/2020 and it was compared to the one which was done in August 2019 and it showed increased size of several metastatic lesions most notably at T9 but also at T5-T7.  Other lesions at  T10, T11 and T12 appeared improved.    CA 27-29 was also down to 222 on 3/18/2020.    Cycle #4 eribulin ( dose reduced to 1.2 mg/m2 )-4/7/2020-   Cycle #5 Eribulin ( 1.2 mg/m2 )- 4/28/2020   Cycle #6 Eribulin ( 1.2 mg/m2 )- 5/19/2020     Cycle #7 Eribulin ( 1.2 mg/m2 )- 6/9/2020    Cycle #8 Eribulin ( 1.2 mg/m2 )- 6/30/2020     She had a repeat PET scan on 7/17/2020 which showed incidental finding of new acute bilateral pulmonary embolism in the distal left main pulmonary artery extending in the left upper and lower lobes and in the segmental and branch arteries in the right lower lobe.    It also showed mildly increased FDG avidity in the lytic lesion in the T9 lamina and right pedicle with SUV max 5.3, previously 3.9.  That is also slightly increased FDG uptake to the left anterior fifth rib at the costochondral junction SUV max 3.9, previously 2.5.  There is slightly decreased FDG uptake in the right femoral neck lesion SUV max 2.2, previously 2.9.  FDG uptake in other bone lesions is fairly stable.  No new metastasis are seen.    CA 27-29 was 308 on 6/30/2020.  It was 286 on 5/19/2020.    Overall this is consistent with only slight progression versus stable disease.    She was started on Lovenox.    Cycle #9 eribulin 7/21/2020. CA 27-29 was 238    C#10 eribulin 8/18/2020. ( delayed by one week for ANC 0.9 )    C#11 Eribulin 9/8/2020    C#12 Eribulin 9/29/2020     C#13 Eribulin 10/20/2020     PET scan on 11/6/2020 shows progression of the disease with increased FDG uptake in the lytic lesions in the T9/T10 and right posterolateral elements as well as increased FDG uptake in the previously known hypermetabolic right iliac bone lesion suggesting recurrence of previously treated metastasis.  There is new subtle lesion in the right manubrium.  There is also a new fracture in the anterolateral left fourth rib with associated uptake and this could be a pathologic fracture.  Healing fracture in the left anterior fifth  rib lesion.  There is resolution of the left pulmonary emboli.  Decreased FDG uptake of the esophagus consistent with improving inflammation.    CA 27-29 on 10/20/2020 was also elevated at 489.    C#1 Trodelvy on 11/11/2020.  Cycle #2 Trodelvy 12/2/2020  Cycle number 2-day #8 Trodelvy 12/8/2020.    12/15/2020-12/16/2020.  She was admitted to the hospital with nausea vomiting and dehydration.  She had tachycardia and initial lactic acid of 5.  It decreased to 1.4 after 2 L of normal saline.  She also had hypokalemia and ANC was 1.3.  She was treated with IV fluids.  Procalcitonin was negative.  CTA of the chest was negative for pulmonary embolism or pneumonia.  No bacterial infection was documented.  Her medication which she was initially unable to tolerate at home, were restarted and she was discharged home once started to feel better.      We delayed the start of cycle number 3 x 1 week and decrease the dose of chemotherapy by 25%.  She also had neutropenia with ANC of 1.0.      She started cycle number 3-day #1 on 12/29/2020.  CA 27-29 was 392.    Cycle number 3-day #8 1/5/2021.    C#4 Trodelvy 1/20/2021- 75% dose    2/5/2021.  PET/CT overall shows a good response to treatment with improvement of previously hypermetabolic lesions in the spine and pelvis and stability of other bone meta stasis.  There is a single lytic lesion in the right iliac bone which demonstrates slight Yimi increased FDG uptake and has SUV max 4.7 while previously it was SUV max 3.4.  There was diffuse wall thickening of the esophagus with uptake in the esophagus in the fundus of the stomach and this could be seen with inflammation.    Trodelvy cycle #5 - 2/10/2021.  75% of the dose.  CA 27-29 was 337.    Trodelvy cycle #6 - 3/3/2021.  75% of the dose.  Trodelvy cycle #6, D#8 - 3/10/2021.  75% of the dose.    Trodelvy C#8, D#8 on 4/21/2021  CA 27-29 stable at 371 on 4/14/2021    Trodelvy, cycle #9- 5/5/2021        On 2/25/2020 when she had  biopsy of the right acetabulum and it was consistent with metastatic lobular carcinoma.  Again it was Triple Negative.     On 3/18/2020 when she also met with Dr. Arelis Arshad who also advised testing for androgen receptor studies on the biopsy specimen.  Tumor was diffusely androgen receptor positive.      5/26/2021-cycle #10 Trodelvy     CA 27-29 was 545.    6/11/2021.  PET/CT shows mildly increased uptake in several bone lesions for example in the left anterior iliac crest, mid right iliac crest and left ischium.  Uptake in other bone lesions are similar to previously.    Because of minimal progression of the disease and she is tolerating chemotherapy very well, we decided on continuing the same chemotherapy.    6/16/2021-Trodelvy cycle #11    7/7/2021 -Trodelvy cycle #12    9/14/2021-Trodelvy-cycle #15, day #8.    9/8/2021-CA 27-29 was more elevated and it was 1176.    PET/CT on 9/24/2021 showed stable findings with no evidence of FDG avid metastatic disease within the body.  Left axillary lymph nodes are prominent and hypermetabolic and likely from recent vaccinations in the left deltoid muscle.  Healed bony metastasis seems stable.      Cycle #16, Trodelvy 9/28/2021.  Cycle #17, day #8 Trodelvy was on 10/26/2021.  Cycle #18, day #8 Trodelvy on 11/22/2021       She saw Dr. Mejia whom on 10/6/2021.  She was not considered eligible for Enfortumab trial.    Cycle #19, Trodelvy on 12/7/2021 12/21/2021.  PET/CT showed progression of bony metastatic disease.  There is increase hypermetabolism in both the right and left iliac bones and the sternum.  Other bone lesions are stable.    There is new scattered areas of groundglass throughout both the lungs and these findings are indeterminate but may represent an infectious etiology.  Interval resolution of left axillary FDG avid lymphadenopathy.    She was started on Casodex 150 mg daily on 1/6/2022.    She was admitted to the hospital from 3/17/2022-3/19/2022 for  vomiting and diarrhea and  severe back pain.  I reviewed the discharge summary.  CT lumbar spine showed a stable severe chronic L1 pathologic compression fracture.  Stable mild compression deformity of superior endplate of L3 and superior endplate of L4.  The upper margin of the L4 fracture extends slightly into the spinal canal without high-grade canal stenosis and this is also stable.  Nondisplaced fractures coursing through the L3 pedicles bilaterally were seen which were not clearly seen previously this could be acute and likely pathologic.  No extraspinal soft tissue abnormality.  Neurosurgery recommended conservative management.  Her pain was controlled and she was discharged home as she felt better with this.      3/31/2022-PET/CT shows progressive bone meta stasis with progressive FDG uptake in the following lesions. Left iliac crest lesion with max SUV of 7.2, previously 3.5. Right mid iliac crest lytic lesion shows maximum SUV of 7.8, previously 6.9.  T10 vertebral body mixed lytic and sclerotic lesion with an SUV of 7.1, previously not hypermetabolic. Thoracic vertebral bodies 8, 7, 5, and 4 also show hypermetabolic lesions were previously there was no hypermetabolism.  Some of the sclerotic osseous lesions are unchanged and do not show increased hypermetabolism compatible treated lesion such as a severe compression deformity at L1 as well as superior compression deformity at L4.  Medial right clavicle sclerotic lesion with SUV max of 4.8, previously not hypermetabolic. There is also right-sided sternal lesions.      4/14/2022--C#1- switch to single agent Doxil 50 mg per metered squared every 4 weeks.    5/13/2022-cycle #2- Doxil- dose reduced to 40 mg per metered square due to severe nausea/vomiting and migraines requiring IV fluids and IV antiemetics.    6/10/2022-cycle #3-Doxil 40 mg per metered square    7/1/2022- PET/CT shows resolution of the hypermetabolic skeletal metastasis.    7/6/2022-cycle  #4-Doxil 40 mg per metered square  8/5/2022- cycle #5-Doxil  9/1/2022.  Cycle #6-Doxil  9/30/2022- cycle #7-Doxil    10/18/2022.  PET/CT does not show evidence of any FDG activity    11/2/2022-cycle #8-Doxil.  11/30/2022-cycle #9-Doxil.  12/28/2022-cycle #10-Doxil    2/3/2023- C#11- Doxil (delayed by 1 week because neutrophil count was 0.9)-wanted to give her Onpro but at that time it was not approved by insurance.    3/3/2023.  Cycle #12 Doxil.  Given with Onpro.    Repeat PET/CT on 3/27/2023 overall showed very stable findings.  PET/CT showed focal FDG avidity in the heart. This is in an expected location of AV node or mitral valve.  This is nonspecific.  We checked an echocardiogram on 4/20/2023 which was unremarkable.  She is asymptomatic.  Continue to monitor clinically.      3/31/2023.  Cycle #13 Doxil.  Given with Onpro.     4/12/2023-she presented to ED with increased migraine headaches and nausea and vomiting.  She was treated symptomatically and was discharged home after she started to feel better.      4/28/2023.  Cycle #14 Doxil.  Given with Onpro    Cycle #16 Doxil with Onpro 6/23/2023    Cycle #17 Doxil with Onpro is scheduled for 7/21/2023      Interval history.  This is a video visit.    She mentioned that she is tolerating the chemotherapy well.  Pain is under decent control with buprenorphine buccal film and she has not used as needed morphine recently.  She is still has issues with migraines.  Denies any significant nausea vomiting diarrhea constipation.  No new swellings.  Taking calcium and vitamin D.  Neuropathy is the same as before.        ECOG 1    ROS:  Rest of the comprehensive review of the system was negative.        I reviewed other history in epic as below.       PAST MEDICAL HISTORY:     1.  Breast cancer  2.  Multiple sclerosis.   3.  Depression.   4.  Migraines.   5.  Hypertension.   6.  Cholecystectomy and umbilical hernia repair.     SOCIAL HISTORY: She smoked for 5 years but quit  "many years ago.  Drinks alcohol socially.  She lives with her .  She is a teacher in middle school.       FAMILY HISTORY: She mentions that her mother had breast cancer at 49.  Both grandmothers had breast cancer but she is not sure of the age.  A couple of cousins have cancers.  Patient has 3 children who are healthy.    Current Outpatient Medications   Medication     Buprenorphine HCl (BELBUCA) 300 MCG FILM buccal film     busPIRone (BUSPAR) 15 MG tablet     calcium citrate-vitamin D (CITRACAL) 315-250 MG-UNIT TABS     Cholecalciferol (VITAMIN D3 PO)     ELIQUIS ANTICOAGULANT 5 MG tablet     galcanezumab-gnlm (EMGALITY) 120 MG/ML injection     ketorolac (TORADOL) 15 MG/ML injection     LORazepam (ATIVAN) 0.5 MG tablet     LORazepam (ATIVAN) 1 MG tablet     metoclopramide (REGLAN) 5 MG tablet     morphine (MSIR) 15 MG IR tablet     Needle, Disp, 27G X 1/2\" MISC     OLANZapine (ZYPREXA) 5 MG tablet     ondansetron (ZOFRAN ODT) 4 MG ODT tab     propranolol ER (INDERAL LA) 80 MG 24 hr capsule     traZODone (DESYREL) 50 MG tablet     ubrogepant (UBRELVY) 100 MG tablet     venlafaxine (EFFEXOR XR) 75 MG 24 hr capsule     vitamin B-2 (RIBOFLAVIN) 25 MG TABS tablet     Zavegepant HCl 10 MG/ACT SOLN     No current facility-administered medications for this visit.        Allergies   Allergen Reactions     Bactrim [Sulfamethoxazole W/Trimethoprim]      Internal bleeding     Copaxone [Glatiramer] Hives          PHYSICAL EXAMINATION:     There were no vitals taken for this visit.  Wt Readings from Last 4 Encounters:   07/10/23 56.7 kg (125 lb)   07/03/23 56.7 kg (125 lb)   06/23/23 59.5 kg (131 lb 1.6 oz)   06/20/23 59.1 kg (130 lb 3.2 oz)         Constitutional.  Does not seem to be in any acute distress.  Eyes.  No redness or discharge noted.  Respiratory.  Speaking in full sentences.  Breathing seems comfortable without any accessory use of muscles.    Skin.  Visualized his skin does not show any obvious " rashes.  Musculoskeletal.  Range of motion for visualized areas is intact.  Neurological.  Alert and oriented x3.  Psychiatric.  Mood, mentation and affect are normal.  Decision making capacity is intact.      The rest of a comprehensive physical examination is deferred due to Public [facility] Emergency video visit restrictions.        Labs and Imaging.    Reviewed.  7/3/2023    CBC was unremarkable   CMP unremarkable except albumin 8.3.     CA 15-3 was 177 on 6/23/2023      CA 15-3 was 129 on 3/31/2023      CA 15-3 was 123 on 3/3/2023.      11/30/2022     CA 15-3 was 135.    9/30/2022  CA 15-3 was 159.  CA 27-29 was 1992 on 6/10/2022  CA 27-29 was 3370 on 5/13/2022.  It was 5307 on 4/14/2022 ferritin Doxil was started.      9/28/2021.      Iron studies, ferritin is 101, iron 51, TIBC 268 and iron saturation index 19%.    7/6/2023.  MRI brain  IMPRESSION:  1.  No acute ischemia, mass/mass effect, or abnormal intracranial enhancement.   2.  Similar sequela from demyelinating disease.  3.  Osseous metastases, as before.      Echocardiogram 4/20/2023  Left ventricular size, wall motion and function are normal. The ejection  fraction is 60-65%.  Global right ventricular function is normal.  No hemodynamically significant valve abnormalities.  The inferior vena cava is normal.  No pericardial effusion.     This study was compared with the study from 4/7/22. No significant change.      3/27/2023.  PET/CT  1. No new suspicious FDG uptake within the skeleton.  2. Stable numerous non-FDG avid lytic and sclerotic osseous lesions  throughout the axial skeleton.  3. Stable nondisplaced fracture of the left acromion/glenoid, chronic  bilateral healing rib fractures, and compression deformities of the  thoracic and lumbar spine.  4. Hypermetabolic focus in the expected location of the AV node versus  mitral valve, this may represent a normal AV node versus mitral valve  pathology such as vegetations. Recommend follow-up with  cardiac  Ultrasound.        11/30/2022-x-ray of the ribs of the left side  There are a few sclerotic lesion seen in the right anterior ribs that were seen on previous PET CTs. These appear to involve the fourth and fifth ribs. There are likely more subtle lesions that were   described on the PET CT scan. Irregularity of the distal end of the left ninth rib may be from metastatic disease or fracture on the oblique view which likely is chronic.       10/18/2022-PET/CT showed no new suspicious FDG uptake within the skeleton.  Stable abnormal FDG avid lytic and sclerotic osseous lesions.  Mild diffuse FDG uptake throughout the large bowel without CT abnormality.    7/1/2022.  PET/CT shows diffuse sclerotic and lytic osseous lesions without any abnormal FDG uptake in the skeleton, consistent with treated disease.  There is evaluation of previous hypermetabolic bone lesions.  Multiple chronic rib fracture deformities and stable compression fracture deformities of T6, L1 and L4.  No suspicious FDG uptake in the chest abdomen or pelvis.    3/31/2022-PET/CT shows progressive bone metastasis with progressive FDG uptake in the following lesions. Left iliac crest lesion with max SUV of 7.2, previously 3.5. Right mid iliac crest lytic lesion shows maximum SUV of 7.8, previously 6.9.  T10 vertebral body mixed lytic and sclerotic lesion with an SUV of 7.1, previously not hypermetabolic. Thoracic vertebral bodies 8, 7, 5, and 4 also show hypermetabolic lesions were previously there was no hypermetabolism.  Some of the sclerotic osseous lesions are unchanged and do not show increased hypermetabolism compatible treated lesion such as a severe compression deformity at L1 as well as superior compression deformity at L4.  Medial right clavicle sclerotic lesion with SUV max of 4.8, previously not hypermetabolic. There is also right-sided sternal lesions.      Biopsy from the right acetabulum shows metastatic lobular carcinoma.  It is  triple negative.  Androgen receptor was diffusely positive (90%, moderate intensity) by immunohistochemistry. )  PD-L1 negative.    Foundation one showed CDH1 mutation (initially lobular cancer), CCND1 amplification ( equivocal ). FGF3, FGF4, FGF19 amplification ( Equivocal ). Most promising is CCND1 amplification with abemaciclib showing response in 4/10 patients. FGF3,FGF4 and FGF19 are targetable with pan-FGFR inhibitors.           ASSESSMENT AND PLAN:     Metastatic breast cancer negative breast cancer (androgen receptor positive ) with extensive involvement of the bones.  There is also a left lower lobe hypermetabolic lesion and mediastinal lymph nodes which are hypermetabolic.  The biopsy from the right iliac bone is consistent with metastatic lobular breast cancer but it is hormone receptor and HER-2 negative and PDL 1 is also negative.    Foundation 1 testing did not reveal any actionable mutations.    She was intolerant to Xeloda and progressed on Taxol and eribulin.      We then switched to recently FDA approved sacituzumab govitecan-hziy (Trodelvy) for TNBC, based on the results of the phase II IMMU-132-01 clinical trial.   She started this on 11/11/2020.      She [facility] who recommended a repeat biopsy to be done to make sure that she really has triple negative breast cancer.      She also met with Dr. Arelis Arshad on 3/18/2021.      After 10 cycles of Trodelvy, she had PET/CT on 6/11/2021 which showed mildly increased uptake in some of the bone lesions while stability in other bone lesions.        After 15 cycles, repeat PET scan showed stable findings.  CA 27-29 has been increasing and it is 1176.      As she was tolerating the chemotherapy well and her quality of life has been decent and scans were stable although she had a rising CA 27-29, we decided on continuing the same chemotherapy because it has been a very slow progression of the  disease.    Then she had clear progression of the disease in the bones with increased FDG uptake in both right and left iliac bones and the sternum.    Foundation one showed CDH1 mutation (initially lobular cancer), CCND1 amplification ( equivocal ). FGF3, FGF4, FGF19 amplification ( Equivocal ). Most promising is CCND1 amplification with abemaciclib showing response in 4/10 patients. FGF3,FGF4 and FGF19 are targetable with pan-FGFR inhibitor    As the tumor is diffusely positive for androgen receptor, we decided to start her on androgen receptor blockers.    I initially recommended enzalutamide 160 mg daily ( Enzalutamide for the Treatment of Androgen Receptor-Expressing Triple-Negative Breast Cancer----J Clin Oncol. 2018 Mar 20; 36(9): 884-890. ).    Eventually we decided to start her on Casodex 150 mg daily instead of Enzalutamide due to cost issues.  Clinical Trial Clin Cancer Res. 2013 Oct 1;19(19):5505-12.   Phase II trial of bicalutamide in patients with androgen receptor-positive, estrogen receptor-negative metastatic Breast Cancer  She started Casodex on 1/6/2022.    She had progressive disease in the bones on the PET scan on 3/31/2022.    We changed to single agent Doxil on 4/14/2022.      She developed significant nausea vomiting and headache so cycle #2 was given on 5/13/2022 with 20% dose reduction which she tolerated well.    Cycle #3 was given on 6/10/2022 with the same dose reduced Doxil.    Repeat PET/CT on 7/1/2022 shows resolution of the FDG avid bony metastasis consistent with treated disease.  No suspicious FDG uptake was seen in the chest abdomen and pelvis.  CA 27-29 was also coming down.     Repeat PET/CT in October 2022 after completing 7 cycles continued to show normal FDG uptake.  CA 15-3 was trending down     Cycle #11 was delayed by 1 week because of chemotherapy-induced neutropenia.  It was received on 2/3/2023.      She received cycle #12 on 3/3/2023 with Onpro.    Repeat PET/CT on  3/27/2023 overall showed very stable findings.    Cycle 13 was given on 3/31/2023 with Onpro support.      Cycle #14 was on 4/28/2023.   CA 15-3 was stable at 115.     She is tolerating chemotherapy well.  CA 15-3 has been slowly rising so our plan will be to give her cycle #17 Doxil on 7/21/2023 and then repeat PET scan after that    Chemotherapy-induced neutropenia.  Continue Onpro because of chemotherapy related neutropenia.  Last ANC was normal.      Bone disease.  She has metastatic disease to the bone.  Previously she got palliative radiation to T12-L5 3000 cGy in 10 fractions from 9/6/2019 till 9/18/2019.  She was evaluated by orthopedics Dr. Bipin Elliott on 11/1/2019 and conservative management was recommended.    She was on Zometa every 3 months. She last received on 9/29/2020.  Because of progression of the disease in the bones, we switched to Xgeva which she received on 11/11/2020.    She had progression of the disease in the bones so we switched to Doxil but we continued xgeva.  PET scan on 7/1/2022 does not show any suspicious FDG uptake in the bones consistent with treated disease.  Repeat PET/CT in March 2023 was without any active FDG avid lesions.   Continue Xgeva calcium and vitamin D.       Cancer related pain.  Pain is well managed with buprenorphine buccal film.  She has not required morphine for quite some time.  She will continue to follow with palliative care       Migraines.  On monthly Aimovig injections.  She takes as needed Amerge  Following with neurology.       Bilateral pulmonary emboli.  Continue indefinite Eliquis for incidentally found PE in the setting of metastatic breast cancer.  I refilled Eliquis today    We did not address the following today.      Shingles.  The rash has dried out.  She completed well with acyclovir and prednisone.  She takes gabapentin for postherpetic neuralgia and is doing better.        Constipation. Continue senna.    Neuropathy.  This remains mild  and stable with neuropathy in her hands.    This is in addition to the chronic balance issues and heat and cold sensitivity from underlying multiple sclerosis which is also stable for years.  Continue to monitor.     Subcutaneous nodule in the scalp.  This looks like an epidermoid cyst.  She thinks it is a stable.  Continue to monitor.       Insomnia.  Continue trazodone.      Wall thickening of esophagus and FDG uptake of fundus of the stomach.  It could be in the setting of inflammation from recent nausea and vomiting.  Symptoms have resolved.  Continue to monitor clinically.    Vision changes.    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  It was thought that this could be due to Taxol as patient reported to the ophthalmologist that she was on Taxol in September 2019 when her symptoms started.  But she was not on Taxol in September 2019 when she started noticing the visual changes so Taxol is not responsible for this.  The first dose of Taxol was on 11/5/2019.   She had left cataract extraction on 2/8/2021 and she has noticed significant improvement in her vision.  Thinks that her vision is back to her usual.      Increased FDG ability in the colon.  She had FDG uptake in the cecum and ascending colon but no corresponding lesion was seen on the CT scan.  She is completely asymptomatic so at this time we will continue to observe.    Discussion regarding healthcare directives.  On previous visit she told me that she will bring the health care directive for our records but she forgot so I told her to bring it on next visit.      Breast implant removal.  She tells me that she was planning to do breast implant removal because there was a recall on this.  Her surgery was scheduled for 9/24/2019.  Because of this new development of metastatic breast cancer and her recent radiation, surgery has been canceled.  We discussed that at this time treatment for metastatic breast cancer would  take precedence over the other surgery as the other surgery would require her to be off chemotherapy for several weeks and in that case the chances of cancer progression would be high and I would not recommend that.    Multiple sclerosis.  She will continue to follow with her neurologist Dr. Quiles.  Currently multiple sclerosis is under control and currently she is not taking any medications.    Return to clinic to see me 8/10/2023 with PET scan prior    All of her questions were answered to her satisfaction.  She is agreeable and comfortable with the plan.    Dewayne Zepeda MD              Again, thank you for allowing me to participate in the care of your patient.        Sincerely,        Dewayne Zepeda MD

## 2023-07-18 NOTE — PROGRESS NOTES
Virtual Visit Details    Type of service:  Video Visit     Originating Location (pt. Location): Home  Distant Location (provider location):  Off-site  Platform used for Video Visit: [employer]   Video start time. 2:20 PM  Video stop time. 2:26 PM     ONCOLOGY FOLLOW UP NOTE: 7/18/23        I took the history from reviewing the previous notes that she was following with Dr. Amaya.  I have copied and updated from prior notes.    ONCOLOGY History:    1.  January 2006:  Diagnosed with Stage IIB, T2 N1 M0 invasive lobular carcinoma of the right breast.  Final pathology showed a 4.5 x 3.8 x 2.5 cm, 01/14 lymph nodes positive.  Estrogen, progesterone receptor positive, HER-2 negative.     2.  Genetics.  BRCA1 and 2 mutations not detected. 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       THERAPY TO DATE:   1. 2006:  ECOG 2104 protocol of dose-dense Adriamycin, Cytoxan and Avastin x4 cycles followed by Taxol and Avastin x4 cycles.   2.  03/2007:  Completed 1 year Avastin.   3.  11/2006-01/2007:  Radiotherapy to the right breast of 5040 cGy.   4.  03/[id number]:  Aromasin.  Stopped after moving to the [facility].   5.  01/2016:  Right modified radical mastectomy with latissimus dorsi flap reconstruction and a left prophylactic mastectomy with latissimus dorsi flap reconstruction.     She recently had MRI of the brain as a follow-up for multiple sclerosis and there were multiple calvarial lesions noted which was suspicious for metastatic disease.  There was no evidence of new multiple sclerosis lesions.  She had a PET scan on 8/30/2019 which showed widespread bone metastatic lesions (calvarium, spine, sacrum, pelvis, ribs most prominent at C7, T3, L1 and L4), hypermetabolic 3 cm lesion in LLL, and mediastinal/hilar LN. CEA wnl at 1.9 and C27-29 elevated to 415 (28 on 8/23/16). A CT guided biopsy of the right iliac bone was obtained on 9/4/19, pathology consistent with metastatic adenocarcinoma (breast), ER/FL negative,  HER-2 negative PDL 1 < 1%. A second biopsy was take of the LLL nodule via EBUS on 9/5/19 did not show any malignancy.    C/T/L spine MRI 8/3/19 to 9/2/19 with numerous enhancing lesions of the C, T and L spine, largest around T9 and L1. No evidence of cord compression. Has a L1 compression fracture and impending L4.     Received radiation T12-L5 3000 cGy in 10 fractions from 9/6/2019 till 9/18/2019.  Neurosurgery recommended no surgical intervention but to wear [place] brace when out of bed and HOB >30 degrees    She started palliative Xeloda 2000/1500 on 10/1/2019 but after just a few doses she noticed nausea, red eyes blurred vision, loss of appetite.  She stopped taking it after 5 doses and was seen by nurse practitioner on 10/7/2019 when she was improving.  At that point she was restarted on Xeloda at a lower dose of 1000 mg twice a day.  As she was not able to tolerate even the lower dose with extreme nausea and vomiting and feeling extremely fatigued and blurry vision, we decided on stopping Xeloda.    We decided on repeating scans and MRI brain on 10/25/2019 showed no intracranial metastatic disease.   Multiple enhancing calvarial lesions may be increased in number and are suggestive of metastatic disease. Thinner imaging on the current study may identify the smaller lesions and may be responsible for  identified more lesions.   There is a single focus of T2 hyperintense signal within the anterior right temporal lobe may represent interval demyelination. There is no evidence for active demyelination.    PET/CT on 11/1/2019 showed favorable response to therapy and Overall FDG uptake within scattered osseous metastases is decreased since 8/30/2019, particularly within the pelvis. Increased sclerotic appearance of L1 and L4 pathologic compression deformities, likely sequela of recently completed palliative radiation therapy.    No significant FDG uptake in previously demonstrated hypermetabolic mediastinal lymph  nodes. Biopsy negative on 9/5/2019.  There is also decreased FDG uptake in the left lower lobe (biopsy negative for  malignancy), now with dense consolidation containing air bronchograms suggestive of infection versus aspiration.  There is diffuse FDG uptake within the esophagus, consistent with esophagitis.    Because of intolerance to Xeloda we decided on switching to weekly paclitaxel on 11/5/2019.    C#2 Taxol 12/4/19- started with 70mg/m2 dose reduction    C#3 Taxol 12/30/2019     PET/CT on 1/24/2020 showed progression of the disease in some of the bone lesions including increase in size and lytic lesion within the vertebral body of C4 measuring 1 cm as opposed to 0.6 cm previously and a new central soft tissue nodule with SUV max 4.1 when previously it was non-hypermetabolic.  There is also some progression noted in the right proximal femur and right iliac bone.  There is decreased metabolic uptake in the right lamina of T9 with SUV max 4.7 when previously it was 8.0.  Other lesions are stable.    CA 27-29 also had increased significantly and it was 257 on 1/28/2020.    We decided on switching to eribulin on 1/28/2020.    C#2 2/18/2020  C#2 D#8 2/25/2020    C#3 D#1 3/18/2020 -delayed by 1 week as per patient's preference because she wanted to visit her family.    She had a repeat PET/CT on 3/27/2020 which showed stable size of the bone metastatic lesions although the FDG avidity was less, consistent with treatment response.    She had MRI of the thoracic spine on 4/3/2020 and it was compared to the one which was done in August 2019 and it showed increased size of several metastatic lesions most notably at T9 but also at T5-T7.  Other lesions at T10, T11 and T12 appeared improved.    CA 27-29 was also down to 222 on 3/18/2020.    Cycle #4 eribulin ( dose reduced to 1.2 mg/m2 )-4/7/2020-   Cycle #5 Eribulin ( 1.2 mg/m2 )- 4/28/2020   Cycle #6 Eribulin ( 1.2 mg/m2 )- 5/19/2020     Cycle #7 Eribulin ( 1.2 mg/m2 )-  6/9/2020    Cycle #8 Eribulin ( 1.2 mg/m2 )- 6/30/2020     She had a repeat PET scan on 7/17/2020 which showed incidental finding of new acute bilateral pulmonary embolism in the distal left main pulmonary artery extending in the left upper and lower lobes and in the segmental and branch arteries in the right lower lobe.    It also showed mildly increased FDG avidity in the lytic lesion in the T9 lamina and right pedicle with SUV max 5.3, previously 3.9.  That is also slightly increased FDG uptake to the left anterior fifth rib at the costochondral junction SUV max 3.9, previously 2.5.  There is slightly decreased FDG uptake in the right femoral neck lesion SUV max 2.2, previously 2.9.  FDG uptake in other bone lesions is fairly stable.  No new metastasis are seen.    CA 27-29 was 308 on 6/30/2020.  It was 286 on 5/19/2020.    Overall this is consistent with only slight progression versus stable disease.    She was started on Lovenox.    Cycle #9 eribulin 7/21/2020. CA 27-29 was 238    C#10 eribulin 8/18/2020. ( delayed by one week for ANC 0.9 )    C#11 Eribulin 9/8/2020    C#12 Eribulin 9/29/2020     C#13 Eribulin 10/20/2020     PET scan on 11/6/2020 shows progression of the disease with increased FDG uptake in the lytic lesions in the T9/T10 and right posterolateral elements as well as increased FDG uptake in the previously known hypermetabolic right iliac bone lesion suggesting recurrence of previously treated metastasis.  There is new subtle lesion in the right manubrium.  There is also a new fracture in the anterolateral left fourth rib with associated uptake and this could be a pathologic fracture.  Healing fracture in the left anterior fifth rib lesion.  There is resolution of the left pulmonary emboli.  Decreased FDG uptake of the esophagus consistent with improving inflammation.    CA 27-29 on 10/20/2020 was also elevated at 489.    C#1 Trodelvy on 11/11/2020.  Cycle #2 Trodelvy 12/2/2020  Cycle number  2-day #8 Trodelvy 12/8/2020.    12/15/2020-12/16/2020.  She was admitted to the hospital with nausea vomiting and dehydration.  She had tachycardia and initial lactic acid of 5.  It decreased to 1.4 after 2 L of normal saline.  She also had hypokalemia and ANC was 1.3.  She was treated with IV fluids.  Procalcitonin was negative.  CTA of the chest was negative for pulmonary embolism or pneumonia.  No bacterial infection was documented.  Her medication which she was initially unable to tolerate at home, were restarted and she was discharged home once started to feel better.      We delayed the start of cycle number 3 x 1 week and decrease the dose of chemotherapy by 25%.  She also had neutropenia with ANC of 1.0.      She started cycle number 3-day #1 on 12/29/2020.  CA 27-29 was 392.    Cycle number 3-day #8 1/5/2021.    C#4 Trodelvy 1/20/2021- 75% dose    2/5/2021.  PET/CT overall shows a good response to treatment with improvement of previously hypermetabolic lesions in the spine and pelvis and stability of other bone meta stasis.  There is a single lytic lesion in the right iliac bone which demonstrates slight Yimi increased FDG uptake and has SUV max 4.7 while previously it was SUV max 3.4.  There was diffuse wall thickening of the esophagus with uptake in the esophagus in the fundus of the stomach and this could be seen with inflammation.    Trodelvy cycle #5 - 2/10/2021.  75% of the dose.  CA 27-29 was 337.    Trodelvy cycle #6 - 3/3/2021.  75% of the dose.  Trodelvy cycle #6, D#8 - 3/10/2021.  75% of the dose.    Trodelvy C#8, D#8 on 4/21/2021  CA 27-29 stable at 371 on 4/14/2021    Trodelvy, cycle #9- 5/5/2021        On 2/25/2020 when she had biopsy of the right acetabulum and it was consistent with metastatic lobular carcinoma.  Again it was Triple Negative.     On 3/18/2020 when she also met with Dr. Arelis Arshad who also advised testing for androgen receptor studies on the biopsy specimen.  Tumor was  diffusely androgen receptor positive.      5/26/2021-cycle #10 Trodelvy     CA 27-29 was 545.    6/11/2021.  PET/CT shows mildly increased uptake in several bone lesions for example in the left anterior iliac crest, mid right iliac crest and left ischium.  Uptake in other bone lesions are similar to previously.    Because of minimal progression of the disease and she is tolerating chemotherapy very well, we decided on continuing the same chemotherapy.    6/16/2021-Trodelvy cycle #11    7/7/2021 -Trodelvy cycle #12    9/14/2021-Trodelvy-cycle #15, day #8.    9/8/2021-CA 27-29 was more elevated and it was 1176.    PET/CT on 9/24/2021 showed stable findings with no evidence of FDG avid metastatic disease within the body.  Left axillary lymph nodes are prominent and hypermetabolic and likely from recent vaccinations in the left deltoid muscle.  Healed bony metastasis seems stable.      Cycle #16, Trodelvy 9/28/2021.  Cycle #17, day #8 Trodelvy was on 10/26/2021.  Cycle #18, day #8 Trodelvy on 11/22/2021       She saw Dr. Mejia whom on 10/6/2021.  She was not considered eligible for Enfortumab trial.    Cycle #19, Trodelvy on 12/7/2021 12/21/2021.  PET/CT showed progression of bony metastatic disease.  There is increase hypermetabolism in both the right and left iliac bones and the sternum.  Other bone lesions are stable.    There is new scattered areas of groundglass throughout both the lungs and these findings are indeterminate but may represent an infectious etiology.  Interval resolution of left axillary FDG avid lymphadenopathy.    She was started on Casodex 150 mg daily on 1/6/2022.    She was admitted to the hospital from 3/17/2022-3/19/2022 for vomiting and diarrhea and  severe back pain.  I reviewed the discharge summary.  CT lumbar spine showed a stable severe chronic L1 pathologic compression fracture.  Stable mild compression deformity of superior endplate of L3 and superior endplate of L4.  The upper  margin of the L4 fracture extends slightly into the spinal canal without high-grade canal stenosis and this is also stable.  Nondisplaced fractures coursing through the L3 pedicles bilaterally were seen which were not clearly seen previously this could be acute and likely pathologic.  No extraspinal soft tissue abnormality.  Neurosurgery recommended conservative management.  Her pain was controlled and she was discharged home as she felt better with this.      3/31/2022-PET/CT shows progressive bone meta stasis with progressive FDG uptake in the following lesions. Left iliac crest lesion with max SUV of 7.2, previously 3.5. Right mid iliac crest lytic lesion shows maximum SUV of 7.8, previously 6.9.  T10 vertebral body mixed lytic and sclerotic lesion with an SUV of 7.1, previously not hypermetabolic. Thoracic vertebral bodies 8, 7, 5, and 4 also show hypermetabolic lesions were previously there was no hypermetabolism.  Some of the sclerotic osseous lesions are unchanged and do not show increased hypermetabolism compatible treated lesion such as a severe compression deformity at L1 as well as superior compression deformity at L4.  Medial right clavicle sclerotic lesion with SUV max of 4.8, previously not hypermetabolic. There is also right-sided sternal lesions.      4/14/2022--C#1- switch to single agent Doxil 50 mg per metered squared every 4 weeks.    5/13/2022-cycle #2- Doxil- dose reduced to 40 mg per metered square due to severe nausea/vomiting and migraines requiring IV fluids and IV antiemetics.    6/10/2022-cycle #3-Doxil 40 mg per metered square    7/1/2022- PET/CT shows resolution of the hypermetabolic skeletal metastasis.    7/6/2022-cycle #4-Doxil 40 mg per metered square  8/5/2022- cycle #5-Doxil  9/1/2022.  Cycle #6-Doxil  9/30/2022- cycle #7-Doxil    10/18/2022.  PET/CT does not show evidence of any FDG activity    11/2/2022-cycle #8-Doxil.  11/30/2022-cycle #9-Doxil.  12/28/2022-cycle  #10-Doxil    2/3/2023- C#11- Doxil (delayed by 1 week because neutrophil count was 0.9)-wanted to give her Onpro but at that time it was not approved by insurance.    3/3/2023.  Cycle #12 Doxil.  Given with Onpro.    Repeat PET/CT on 3/27/2023 overall showed very stable findings.  PET/CT showed focal FDG avidity in the heart. This is in an expected location of AV node or mitral valve.  This is nonspecific.  We checked an echocardiogram on 4/20/2023 which was unremarkable.  She is asymptomatic.  Continue to monitor clinically.      3/31/2023.  Cycle #13 Doxil.  Given with Onpro.     4/12/2023-she presented to ED with increased migraine headaches and nausea and vomiting.  She was treated symptomatically and was discharged home after she started to feel better.      4/28/2023.  Cycle #14 Doxil.  Given with Onpro    Cycle #16 Doxil with Onpro 6/23/2023    Cycle #17 Doxil with Onpro is scheduled for 7/21/2023      Interval history.  This is a video visit.    She mentioned that she is tolerating the chemotherapy well.  Pain is under decent control with buprenorphine buccal film and she has not used as needed morphine recently.  She is still has issues with migraines.  Denies any significant nausea vomiting diarrhea constipation.  No new swellings.  Taking calcium and vitamin D.  Neuropathy is the same as before.        ECOG 1    ROS:  Rest of the comprehensive review of the system was negative.        I reviewed other history in epic as below.       PAST MEDICAL HISTORY:     1.  Breast cancer  2.  Multiple sclerosis.   3.  Depression.   4.  Migraines.   5.  Hypertension.   6.  Cholecystectomy and umbilical hernia repair.     SOCIAL HISTORY: She smoked for 5 years but quit many years ago.  Drinks alcohol socially.  She lives with her .  She is a teacher in middle school.       FAMILY HISTORY: She mentions that her mother had breast cancer at 49.  Both grandmothers had breast cancer but she is not sure of the age.  A  "couple of cousins have cancers.  Patient has 3 children who are healthy.    Current Outpatient Medications   Medication     Buprenorphine HCl (BELBUCA) 300 MCG FILM buccal film     busPIRone (BUSPAR) 15 MG tablet     calcium citrate-vitamin D (CITRACAL) 315-250 MG-UNIT TABS     Cholecalciferol (VITAMIN D3 PO)     ELIQUIS ANTICOAGULANT 5 MG tablet     galcanezumab-gnlm (EMGALITY) 120 MG/ML injection     ketorolac (TORADOL) 15 MG/ML injection     LORazepam (ATIVAN) 0.5 MG tablet     LORazepam (ATIVAN) 1 MG tablet     metoclopramide (REGLAN) 5 MG tablet     morphine (MSIR) 15 MG IR tablet     Needle, Disp, 27G X 1/2\" MISC     OLANZapine (ZYPREXA) 5 MG tablet     ondansetron (ZOFRAN ODT) 4 MG ODT tab     propranolol ER (INDERAL LA) 80 MG 24 hr capsule     traZODone (DESYREL) 50 MG tablet     ubrogepant (UBRELVY) 100 MG tablet     venlafaxine (EFFEXOR XR) 75 MG 24 hr capsule     vitamin B-2 (RIBOFLAVIN) 25 MG TABS tablet     Zavegepant HCl 10 MG/ACT SOLN     No current facility-administered medications for this visit.        Allergies   Allergen Reactions     Bactrim [Sulfamethoxazole W/Trimethoprim]      Internal bleeding     Copaxone [Glatiramer] Hives          PHYSICAL EXAMINATION:     There were no vitals taken for this visit.  Wt Readings from Last 4 Encounters:   07/10/23 56.7 kg (125 lb)   07/03/23 56.7 kg (125 lb)   06/23/23 59.5 kg (131 lb 1.6 oz)   06/20/23 59.1 kg (130 lb 3.2 oz)         Constitutional.  Does not seem to be in any acute distress.  Eyes.  No redness or discharge noted.  Respiratory.  Speaking in full sentences.  Breathing seems comfortable without any accessory use of muscles.    Skin.  Visualized his skin does not show any obvious rashes.  Musculoskeletal.  Range of motion for visualized areas is intact.  Neurological.  Alert and oriented x3.  Psychiatric.  Mood, mentation and affect are normal.  Decision making capacity is intact.      The rest of a comprehensive physical examination is " deferred due to Public Health Emergency video visit restrictions.        Labs and Imaging.    Reviewed.  7/3/2023    CBC was unremarkable   CMP unremarkable except albumin 8.3.     CA 15-3 was 177 on 6/23/2023      CA 15-3 was 129 on 3/31/2023      CA 15-3 was 123 on 3/3/2023.      11/30/2022     CA 15-3 was 135.    9/30/2022  CA 15-3 was 159.  CA 27-29 was 1992 on 6/10/2022  CA 27-29 was 3370 on 5/13/2022.  It was 5307 on 4/14/2022 ferritin Doxil was started.      9/28/2021.      Iron studies, ferritin is 101, iron 51, TIBC 268 and iron saturation index 19%.    7/6/2023.  MRI brain  IMPRESSION:  1.  No acute ischemia, mass/mass effect, or abnormal intracranial enhancement.   2.  Similar sequela from demyelinating disease.  3.  Osseous metastases, as before.      Echocardiogram 4/20/2023  Left ventricular size, wall motion and function are normal. The ejection  fraction is 60-65%.  Global right ventricular function is normal.  No hemodynamically significant valve abnormalities.  The inferior vena cava is normal.  No pericardial effusion.     This study was compared with the study from 4/7/22. No significant change.      3/27/2023.  PET/CT  1. No new suspicious FDG uptake within the skeleton.  2. Stable numerous non-FDG avid lytic and sclerotic osseous lesions  throughout the axial skeleton.  3. Stable nondisplaced fracture of the left acromion/glenoid, chronic  bilateral healing rib fractures, and compression deformities of the  thoracic and lumbar spine.  4. Hypermetabolic focus in the expected location of the AV node versus  mitral valve, this may represent a normal AV node versus mitral valve  pathology such as vegetations. Recommend follow-up with cardiac  Ultrasound.        11/30/2022-x-ray of the ribs of the left side  There are a few sclerotic lesion seen in the right anterior ribs that were seen on previous PET CTs. These appear to involve the fourth and fifth ribs. There are likely more subtle lesions that  were   described on the PET CT scan. Irregularity of the distal end of the left ninth rib may be from metastatic disease or fracture on the oblique view which likely is chronic.       10/18/2022-PET/CT showed no new suspicious FDG uptake within the skeleton.  Stable abnormal FDG avid lytic and sclerotic osseous lesions.  Mild diffuse FDG uptake throughout the large bowel without CT abnormality.    7/1/2022.  PET/CT shows diffuse sclerotic and lytic osseous lesions without any abnormal FDG uptake in the skeleton, consistent with treated disease.  There is evaluation of previous hypermetabolic bone lesions.  Multiple chronic rib fracture deformities and stable compression fracture deformities of T6, L1 and L4.  No suspicious FDG uptake in the chest abdomen or pelvis.    3/31/2022-PET/CT shows progressive bone metastasis with progressive FDG uptake in the following lesions. Left iliac crest lesion with max SUV of 7.2, previously 3.5. Right mid iliac crest lytic lesion shows maximum SUV of 7.8, previously 6.9.  T10 vertebral body mixed lytic and sclerotic lesion with an SUV of 7.1, previously not hypermetabolic. Thoracic vertebral bodies 8, 7, 5, and 4 also show hypermetabolic lesions were previously there was no hypermetabolism.  Some of the sclerotic osseous lesions are unchanged and do not show increased hypermetabolism compatible treated lesion such as a severe compression deformity at L1 as well as superior compression deformity at L4.  Medial right clavicle sclerotic lesion with SUV max of 4.8, previously not hypermetabolic. There is also right-sided sternal lesions.      Biopsy from the right acetabulum shows metastatic lobular carcinoma.  It is triple negative.  Androgen receptor was diffusely positive (90%, moderate intensity) by immunohistochemistry. )  PD-L1 negative.    Foundation one showed CDH1 mutation (initially lobular cancer), CCND1 amplification ( equivocal ). FGF3, FGF4, FGF19 amplification (  Equivocal ). Most promising is CCND1 amplification with abemaciclib showing response in 4/10 patients. FGF3,FGF4 and FGF19 are targetable with pan-FGFR inhibitors.           ASSESSMENT AND PLAN:     Metastatic breast cancer negative breast cancer (androgen receptor positive ) with extensive involvement of the bones.  There is also a left lower lobe hypermetabolic lesion and mediastinal lymph nodes which are hypermetabolic.  The biopsy from the right iliac bone is consistent with metastatic lobular breast cancer but it is hormone receptor and HER-2 negative and PDL 1 is also negative.    Foundation 1 testing did not reveal any actionable mutations.    She was intolerant to Xeloda and progressed on Taxol and eribulin.      We then switched to recently FDA approved sacituzumab govitecan-hziy (Trodelvy) for TNBC, based on the results of the phase II IMMU-132-01 clinical trial.   She started this on 11/11/2020.      She obtained a second opinion from Dr. Castillo from [facility] who recommended a repeat biopsy to be done to make sure that she really has triple negative breast cancer.      She also met with Dr. Arelis Arshad on 3/18/2021.      After 10 cycles of Trodelvy, she had PET/CT on 6/11/2021 which showed mildly increased uptake in some of the bone lesions while stability in other bone lesions.        After 15 cycles, repeat PET scan showed stable findings.  CA 27-29 has been increasing and it is 1176.      As she was tolerating the chemotherapy well and her quality of life has been decent and scans were stable although she had a rising CA 27-29, we decided on continuing the same chemotherapy because it has been a very slow progression of the disease.    Then she had clear progression of the disease in the bones with increased FDG uptake in both right and left iliac bones and the sternum.    Foundation one showed CDH1 mutation (initially lobular cancer), CCND1 amplification ( equivocal ). FGF3, FGF4, FGF19  amplification ( Equivocal ). Most promising is CCND1 amplification with abemaciclib showing response in 4/10 patients. FGF3,FGF4 and FGF19 are targetable with pan-FGFR inhibitor    As the tumor is diffusely positive for androgen receptor, we decided to start her on androgen receptor blockers.    I initially recommended enzalutamide 160 mg daily ( Enzalutamide for the Treatment of Androgen Receptor-Expressing Triple-Negative Breast Cancer----J Clin Oncol. 2018 Mar 20; 36(9): 884-890. ).    Eventually we decided to start her on Casodex 150 mg daily instead of Enzalutamide due to cost issues.  Clinical Trial Clin Cancer Res. 2013 Oct 1;19(19):5505-12.   Phase II trial of bicalutamide in patients with androgen receptor-positive, estrogen receptor-negative metastatic Breast Cancer  She started Casodex on 1/6/2022.    She had progressive disease in the bones on the PET scan on 3/31/2022.    We changed to single agent Doxil on 4/14/2022.      She developed significant nausea vomiting and headache so cycle #2 was given on 5/13/2022 with 20% dose reduction which she tolerated well.    Cycle #3 was given on 6/10/2022 with the same dose reduced Doxil.    Repeat PET/CT on 7/1/2022 shows resolution of the FDG avid bony metastasis consistent with treated disease.  No suspicious FDG uptake was seen in the chest abdomen and pelvis.  CA 27-29 was also coming down.     Repeat PET/CT in October 2022 after completing 7 cycles continued to show normal FDG uptake.  CA 15-3 was trending down     Cycle #11 was delayed by 1 week because of chemotherapy-induced neutropenia.  It was received on 2/3/2023.      She received cycle #12 on 3/3/2023 with Onpro.    Repeat PET/CT on 3/27/2023 overall showed very stable findings.    Cycle 13 was given on 3/31/2023 with Onpro support.      Cycle #14 was on 4/28/2023.   CA 15-3 was stable at 115.     She is tolerating chemotherapy well.  CA 15-3 has been slowly rising so our plan will be to give her  cycle #17 Doxil on 7/21/2023 and then repeat PET scan after that    Chemotherapy-induced neutropenia.  Continue Onpro because of chemotherapy related neutropenia.  Last ANC was normal.      Bone disease.  She has metastatic disease to the bone.  Previously she got palliative radiation to T12-L5 3000 cGy in 10 fractions from 9/6/2019 till 9/18/2019.  She was evaluated by orthopedics Dr. Bipin Elliott on 11/1/2019 and conservative management was recommended.    She was on Zometa every 3 months. She last received on 9/29/2020.  Because of progression of the disease in the bones, we switched to Xgeva which she received on 11/11/2020.    She had progression of the disease in the bones so we switched to Doxil but we continued xgeva.  PET scan on 7/1/2022 does not show any suspicious FDG uptake in the bones consistent with treated disease.  Repeat PET/CT in March 2023 was without any active FDG avid lesions.   Continue Xgeva calcium and vitamin D.       Cancer related pain.  Pain is well managed with buprenorphine buccal film.  She has not required morphine for quite some time.  She will continue to follow with palliative care       Migraines.  On monthly Aimovig injections.  She takes as needed Amerge  Following with neurology.       Bilateral pulmonary emboli.  Continue indefinite Eliquis for incidentally found PE in the setting of metastatic breast cancer.  I refilled Eliquis today    We did not address the following today.      Shingles.  The rash has dried out.  She completed well with acyclovir and prednisone.  She takes gabapentin for postherpetic neuralgia and is doing better.        Constipation. Continue senna.    Neuropathy.  This remains mild and stable with neuropathy in her hands.    This is in addition to the chronic balance issues and heat and cold sensitivity from underlying multiple sclerosis which is also stable for years.  Continue to monitor.     Subcutaneous nodule in the scalp.  This looks like  an epidermoid cyst.  She thinks it is a stable.  Continue to monitor.       Insomnia.  Continue trazodone.      Wall thickening of esophagus and FDG uptake of fundus of the stomach.  It could be in the setting of inflammation from recent nausea and vomiting.  Symptoms have resolved.  Continue to monitor clinically.    Vision changes.    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  It was thought that this could be due to Taxol as patient reported to the ophthalmologist that she was on Taxol in September 2019 when her symptoms started.  But she was not on Taxol in September 2019 when she started noticing the visual changes so Taxol is not responsible for this.  The first dose of Taxol was on 11/5/2019.   She had left cataract extraction on 2/8/2021 and she has noticed significant improvement in her vision.  Thinks that her vision is back to her usual.      Increased FDG ability in the colon.  She had FDG uptake in the cecum and ascending colon but no corresponding lesion was seen on the CT scan.  She is completely asymptomatic so at this time we will continue to observe.    Discussion regarding healthcare directives.  On previous visit she told me that she will bring the health care directive for our records but she forgot so I told her to bring it on next visit.      Breast implant removal.  She tells me that she was planning to do breast implant removal because there was a recall on this.  Her surgery was scheduled for 9/24/2019.  Because of this new development of metastatic breast cancer and her recent radiation, surgery has been canceled.  We discussed that at this time treatment for metastatic breast cancer would take precedence over the other surgery as the other surgery would require her to be off chemotherapy for several weeks and in that case the chances of cancer progression would be high and I would not recommend that.    Multiple sclerosis.  She will continue to follow  with her neurologist Dr. Quiles.  Currently multiple sclerosis is under control and currently she is not taking any medications.    Return to clinic to see me 8/10/2023 with PET scan prior    All of her questions were answered to her satisfaction.  She is agreeable and comfortable with the plan.    Dewayne Zepeda MD

## 2023-07-20 NOTE — TELEPHONE ENCOUNTER
MEDICATION APPEAL DENIED    Medication: ZAVZPRET 10 MG/ACT NA SOLN  Insurance Company: [employer]  Denial Date: 7/18/2023  Denial Rational: REQUESTED MEDICATION IS EXCLUDED FROM BENEFIT COVERAGE      Second Level Appeal Information:       Patient Notified: No  Central Prior Authorization Team ONLY: Second level appeals will be managed by the clinic staff and provider. Please contact the [facility] Prior Authorization Team if additional information about the denial is needed.

## 2023-07-20 NOTE — TELEPHONE ENCOUNTER
Prior Authorization Follow-Up  Per Renetta at [facility] - appeal has been denied, rep will refax the appeal denial letter.

## 2023-07-21 NOTE — PROGRESS NOTES
Infusion Nursing Note:  Shaneka Alvarez presents today for C17 Doxil, OnPro & Xgeva.    Patient seen by provider today: No (visit with Dr. Zepeda on 7/18)   present during visit today: Not Applicable.    Note: Patient reports feeling well overall today. No new medical concerns reported.    Intravenous Access:  Implanted Port.    Treatment Conditions:  Lab Results   Component Value Date    HGB 12.7 07/21/2023    WBC 4.9 07/21/2023    ANEU 1.3 (L) 03/03/2023    ANEUTAUTO 3.4 07/21/2023     07/21/2023      Lab Results   Component Value Date     07/21/2023    POTASSIUM 4.2 07/21/2023    MAG 2.0 03/12/2023    CR 0.68 07/21/2023    RAFAELA 8.6 (L) 07/21/2023    BILITOTAL 0.3 07/21/2023    ALBUMIN 4.0 07/21/2023    ALT 17 07/21/2023    AST 36 07/21/2023     Results reviewed, labs MET treatment parameters, ok to proceed with treatment.    Post Infusion Assessment:  Patient tolerated infusion without incident.  Patient tolerated injection without incident.  Blood return noted pre and post infusion.  Site patent and intact, free from redness, edema or discomfort.  No evidence of extravasations.  Access discontinued per protocol.    ONPRO  Was placed on patient's: left side of abdomen.    Was placed at 1:30 PM    Patient education included: that Neulasta administration will occur at 4:30 on 7/22.    Patient tolerated administration well.     Discharge Plan:   Future appts have been reviewed and crosschecked with appt note and plan.  AVS to patient via [employer].  Patient will return 8/18/2023 for next appointment.   Patient discharged in stable condition accompanied by: self.  Departure Mode: Ambulatory.      Devorah Trivedi RN BSN OCN

## 2023-07-21 NOTE — TELEPHONE ENCOUNTER
We'll hold off a second appeal. It looks like patient reported she took Ubrelvy last time and it worked. Lets wait for now unless stops responding to Ubrelvy. Thank you

## 2023-07-21 NOTE — NURSING NOTE
"Patient's name and  were verified.  See Doc Flowsheet - IV assess for details.  IVAD accessed with 20G 3/4\" mcnair gripper plus needle  blood return positive: YES  Site without redness, tenderness or swelling: YES  flushed with 20cc NS and 5cc 100u/ml heparin  Needle: Left accessed for infusion  Comments: Labs drawn.  Patient tolerated procedure without incident.    Cristina Ceron RN, BSN, OCN    "

## 2023-07-27 NOTE — PROGRESS NOTES
Oncology Follow Up Visit: July 27, 2023     Oncologist: Dr Dewayne Zepeda  PCP: Mohit Barcenas    Diagnosis: Metastatic Breast Cancer to brain and spine  Shaneka Alvarez is a 62 yo female who was diagnosed with Stage IIB, T2 N1 M0 invasive lobular carcinoma of the right breast  In January 2006.  Final pathology showed a 4.5 x 3.8 x 2.5 cm, 01/14 lymph nodes positive.  Estrogen, progesterone receptor positive, HER-2 negative.     Genetics- BRCA1 and 2 mutations not detected. 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  There was no evidence of new multiple sclerosis lesions.  PET scan on 8/30/2019 which showed widespread bone metastatic lesions (calvarium, spine, sacrum, pelvis, ribs most prominent at C7, T3, L1 and L4), hypermetabolic 3 cm lesion in LLL, and mediastinal/hilar LN. CEA wnl at 1.9 and C27-29 elevated to 415 (28 on 8/23/16). A CT guided biopsy of the right iliac bone was obtained on 9/4/19, pathology consistent with metastatic adenocarcinoma (breast), ER/CO negative, HER-2 negative PDL 1 < 1%. A second biopsy was take of the LLL nodule via EBUS on 9/5/19 did not show any malignancy.  C/T/L spine MRI 8/3/19 to 9/2/19 with numerous enhancing lesions of the C, T and L spine, largest around T9 and L1. No evidence of cord compression. Has a L1 compression fracture and impending L4. completed radiation therapy to T12-L5.-Neurosurgery recommended no surgical intervention but to wear [place] brace when out of bed and HOB >30 degrees  Treatment:   2006:  ECOG 2104 protocol of dose-dense Adriamycin, Cytoxan and Avastin x4 cycles followed by Taxol and Avastin x4 cycles.   03/2007:  Completed 1 year Avastin.   11/2006-01/2007:  Radiotherapy to the right breast of 5040 cGy.   03/[id number]:  Aromasin.  Stopped after moving to the [employer]   01/2016:  Right  modified radical mastectomy with latissimus dorsi flap reconstruction and a left prophylactic mastectomy with latissimus dorsi flap reconstruction.   9/6/2019 till 9/18/2019 Received radiation T12-L5 3000 cGy in 10 fractions   -Neurosurgery recommended no surgical intervention but to wear [place] brace when out of bed and HOB >30 degrees  9/18/2019 completed T12-L5 radiation in 10 fractions =3000 cGy  10/1/2019 Xeloda x 2 trials lowering dose to 1000 mg bid  10/23/2019 Began every 3 month Zometa  11/5/2019- 1/14/2020  weekly paclitaxel x 3 cycles  1/28-10/2020 Eribulin  11/11/2020- 12/2021 Trodelvy  Repeat biopsy done from the right acetabulum on 2/25/2021 which showed metastatic lobular breast cancer.  Hormonal studies, HER-2 FISH again showed that it is triple negative.  Androgen receptor is diffusely positive.    PD-L1 negative.  Foundation 1 testing did not reveal any actionable mutations  1-3/2022  bicalutamide with bony progression  4/2022 began liposomal doxorubicin - dose decrease to 80% at 2nd cycle in light of side effects. Tolerated that well. Complete response on scan Oct 2022    Interval History: Ms. Alvarez comes to clinic for review of new and progressive generalized weakness,  increased sweating but without fevers that she has caught, constipation until this morning, and just not feeling well. Admits that she may have some depression about climbing cancer marker. Had migraines x 3 days over weekend and is not able to use the medication that resolves the the migraine due to insurance coverage but no current headaches. Sweats have been much worse lately and having this in office with clothing affected. Not eating well with texture a problem but no nausea- using protein drinks if other food not looking good- weight loss since last visit 6 days ago. Denies fevers, mental status changes, vision changes, chest pain or SOB. No Abdominal pain or blood in the stools. Urinating normal- no blood noted but  "reports may be darker than usual. No rashes or skin changes. Has chronic low back pain- no new neck pain.  Rest of comprehensive and complete ROS is reviewed and is negative.   Past Medical History:   Diagnosis Date    Breast cancer (H)     Age 44    Cataract     left eye    Chemotherapy induced nausea and vomiting 12/15/2020    Common migraine     Esophageal reflux     H/O bilateral mastectomy     Mild major depression (H)     Multiple sclerosis (H)     Pulmonary embolism (H)      Current Outpatient Medications   Medication    apixaban ANTICOAGULANT (ELIQUIS ANTICOAGULANT) 5 MG tablet    Buprenorphine HCl (BELBUCA) 300 MCG FILM buccal film    busPIRone (BUSPAR) 15 MG tablet    calcium citrate-vitamin D (CITRACAL) 315-250 MG-UNIT TABS    Cholecalciferol (VITAMIN D3 PO)    galcanezumab-gnlm (EMGALITY) 120 MG/ML injection    ketorolac (TORADOL) 15 MG/ML injection    LORazepam (ATIVAN) 0.5 MG tablet    LORazepam (ATIVAN) 1 MG tablet    metoclopramide (REGLAN) 5 MG tablet    morphine (MSIR) 15 MG IR tablet    Needle, Disp, 27G X 1/2\" MISC    OLANZapine (ZYPREXA) 5 MG tablet    ondansetron (ZOFRAN ODT) 4 MG ODT tab    propranolol ER (INDERAL LA) 80 MG 24 hr capsule    traZODone (DESYREL) 50 MG tablet    ubrogepant (UBRELVY) 100 MG tablet    venlafaxine (EFFEXOR XR) 75 MG 24 hr capsule    vitamin B-2 (RIBOFLAVIN) 25 MG TABS tablet    Zavegepant HCl 10 MG/ACT SOLN     No current facility-administered medications for this visit.     Facility-Administered Medications Ordered in Other Visits   Medication    heparin 100 unit/mL injection 5 mL    sodium chloride (PF) 0.9% PF flush 10 mL     Allergies   Allergen Reactions    Bactrim [Sulfamethoxazole-Trimethoprim]      Internal bleeding    Copaxone [Glatiramer] Hives       Physical Exam: BP (!) 144/102 (BP Location: Left arm, Patient Position: Chair, Cuff Size: Adult Regular)   Pulse 93   Temp 97.5  F (36.4  C) (Oral)   Ht 1.575 m (5' 2\")   Wt 55.8 kg (123 lb)   SpO2 98%   " BMI 22.50 kg/m   - weight down 7 # in last 6 days.  Constitutional: Alert but diaphoretic and in no distress. Able to get around per self.   ENT: Eyes grossly normal to inspection.  No discharge or erythema, or obvious scleral/conjunctival abnormalities. No mouth sores. TMs clear. No sinus congestion.  Neck: Supple, No adenopathy.  Cardiac: Heart rate and rhythm is regular with normal S1 and S2 without murmur  Respiratory: Breathing easy. Lung sounds clear to auscultation- no cough  Breasts:  Not completed today  Abdomen: Soft, non-tender, BS normal. No masses or organomegaly  MS: Muscle tone normal. extremities normal with no edema. Moving well by self but had  in  bathroom while bathing due to weakness but no falls.   Skin: No suspicious lesions or rashes  Neuro: Sensory grossly WNL, gait normal.   Lymph: Normal ant/post cervical, axillary, supraclavicular nodes  Psych: Mentation appears normal and affect normal/bright with good conversation.  Normal speech and appearance well-groomed.    Laboratory Results:   Results for orders placed or performed in visit on 07/27/23   Comprehensive metabolic panel     Status: Abnormal   Result Value Ref Range    Sodium 136 136 - 145 mmol/L    Potassium 4.0 3.4 - 5.3 mmol/L    Chloride 98 98 - 107 mmol/L    Carbon Dioxide (CO2) 23 22 - 29 mmol/L    Anion Gap 15 7 - 15 mmol/L    Urea Nitrogen 10.3 8.0 - 23.0 mg/dL    Creatinine 0.58 0.51 - 0.95 mg/dL    Calcium 9.0 8.8 - 10.2 mg/dL    Glucose 114 (H) 70 - 99 mg/dL    Alkaline Phosphatase 159 (H) 35 - 104 U/L    AST 32 0 - 45 U/L    ALT 16 0 - 50 U/L    Protein Total 7.1 6.4 - 8.3 g/dL    Albumin 4.5 3.5 - 5.2 g/dL    Bilirubin Total 0.5 <=1.2 mg/dL    GFR Estimate >90 >60 mL/min/1.73m2   CBC with platelets and differential     Status: Abnormal   Result Value Ref Range    WBC Count 24.1 (H) 4.0 - 11.0 10e3/uL    RBC Count 4.90 3.80 - 5.20 10e6/uL    Hemoglobin 14.5 11.7 - 15.7 g/dL    Hematocrit 43.1 35.0 - 47.0 %    MCV 88  78 - 100 fL    MCH 29.6 26.5 - 33.0 pg    MCHC 33.6 31.5 - 36.5 g/dL    RDW 14.7 10.0 - 15.0 %    Platelet Count 183 150 - 450 10e3/uL   Manual Differential     Status: Abnormal   Result Value Ref Range    % Neutrophils 93 %    % Lymphocytes 5 %    % Monocytes 2 %    % Eosinophils 0 %    % Basophils 0 %    Absolute Neutrophils 22.4 (H) 1.6 - 8.3 10e3/uL    Absolute Lymphocytes 1.2 0.8 - 5.3 10e3/uL    Absolute Monocytes 0.5 0.0 - 1.3 10e3/uL    Absolute Eosinophils 0.0 0.0 - 0.7 10e3/uL    Absolute Basophils 0.0 0.0 - 0.2 10e3/uL    RBC Morphology Confirmed RBC Indices     Platelet Assessment  Automated Count Confirmed. Platelet morphology is normal.     Automated Count Confirmed. Platelet morphology is normal.   CRP inflammation     Status: Normal   Result Value Ref Range    CRP Inflammation <3.00 <5.00 mg/L   Erythrocyte sedimentation rate auto     Status: Normal   Result Value Ref Range    Erythrocyte Sedimentation Rate 7 0 - 30 mm/hr   CBC with platelets and differential     Status: Abnormal   Result Value Ref Range    WBC Count 22.3 (H) 4.0 - 11.0 10e3/uL    RBC Count 4.79 3.80 - 5.20 10e6/uL    Hemoglobin 14.2 11.7 - 15.7 g/dL    Hematocrit 42.1 35.0 - 47.0 %    MCV 88 78 - 100 fL    MCH 29.6 26.5 - 33.0 pg    MCHC 33.7 31.5 - 36.5 g/dL    RDW 14.7 10.0 - 15.0 %    Platelet Count 174 150 - 450 10e3/uL    % Neutrophils 83 %    % Lymphocytes 4 %    % Monocytes 8 %    % Eosinophils 0 %    % Basophils 1 %    % Immature Granulocytes 4 %    NRBCs per 100 WBC 0 <1 /100    Absolute Neutrophils 18.5 (H) 1.6 - 8.3 10e3/uL    Absolute Lymphocytes 1.0 0.8 - 5.3 10e3/uL    Absolute Monocytes 1.7 (H) 0.0 - 1.3 10e3/uL    Absolute Eosinophils 0.1 0.0 - 0.7 10e3/uL    Absolute Basophils 0.2 0.0 - 0.2 10e3/uL    Absolute Immature Granulocytes 0.9 (H) <=0.4 10e3/uL    Absolute NRBCs 0.0 10e3/uL   CBC with platelets differential     Status: Abnormal    Narrative    The following orders were created for panel order CBC with  platelets differential.  Procedure                               Abnormality         Status                     ---------                               -----------         ------                     CBC with platelets and d...[[id number]]  Abnormal            Final result               Manual Differential[[id number]]          Abnormal            Final result                 Please view results for these tests on the individual orders.   CBC with platelets differential     Status: Abnormal    Narrative    The following orders were created for panel order CBC with platelets differential.  Procedure                               Abnormality         Status                     ---------                               -----------         ------                     CBC with platelets and d...[[id number]]  Abnormal            Final result                 Please view results for these tests on the individual orders.   Results for orders placed or performed in visit on 07/27/23   UA Macroscopic with reflex to Microscopic and Culture     Status: Abnormal    Specimen: Urine, NOS   Result Value Ref Range    Color Urine Dark Yellow (A) Colorless, Straw, Light Yellow, Yellow    Appearance Urine Clear Clear    Glucose Urine Negative Negative mg/dL    Bilirubin Urine Small (A) Negative    Ketones Urine 150 (A) Negative mg/dL    Specific Gravity Urine 1.027 0.999 - 1.035    Blood Urine Negative Negative    pH Urine 6.0 5.0 - 7.0    Protein Albumin Urine 50 (A) Negative mg/dL    Urobilinogen Urine Normal Normal, 2.0 mg/dL    Nitrite Urine Negative Negative    Leukocyte Esterase Urine Negative Negative    SKIP     UA Microscopic with Reflex to Culture     Status: Abnormal   Result Value Ref Range    RBC Urine 2-5 (A) 0-2 /HPF /HPF    WBC Urine 0-5 0-5 /HPF /HPF    Granular Casts Urine 0-2 (A) None Seen /LPF    Narrative    Urine Culture not indicated       Assessment and Plan:   New generalized weakness and diaphoresis- Pt has significant  rise in WBCs in last 6 days with the new symptoms of weakness and sweats but no obvious area of infection.   Still awaiting GGT and respiratory panel PCR as well as chest xray results however pt has not respiratory symptoms. Inflammatory markers are normal and repeat WBC count shows consistency with significant rise in neutrophils- left shift.   WIll send pt home with Levaquin 500 mg po daily x5 and given instructions to watch for fevers and if weakness progresses or new symptoms she can go to ER for imaging and further investigation otherwise will have PET/CT as set for 8/8/2023.  Will contact her with results of GGT and respiratory results.   Low back pain- ongoing- using buprenorphine every 12 hours- does not use tylenol or ibuprofen. Not progressing and stable for now.   Metastatic Breast Cancer to brain and spine-Pt continues use of Doxil monthly with good tolerance to plan and no new complaints related to infusions.  Her cancer marker is trending up again:   Latest Reference Range & Units Most Recent 04/28/23 12:48 05/26/23 11:40 06/23/23 10:08   Cancer Antigen 15-3 <=25 U/mL 186 (H)  7/21/23 11:02 115 (H) 161 (H) 177 (H)   (H): Data is abnormally high  She is to have PET scan/ CT next week.   Weight loss- down 7 # this week- textures bothering her. Referral to dietician made with pt approval.   Adjustment disorder with depressed mood- Agrees with  that this is true. WIll refer to Dr Rodriguez. Discussed meds but will meet with psychologist first- already on buspar.   Migraines- recurrent- was down for 3 days with currently on emgality and has tordol but do not feel they are effective.  Migraines followed by PCP.   The total time of this encounter amounted to 60 minutes. This time included face to face time spent with the patient, prep work, ordering tests, and performing post visit documentation.  Annie Bailon,Cnp      Addend: review of GGT and respiratory panel PCR = normal. Does not change plan.  [email or web address]

## 2023-07-27 NOTE — NURSING NOTE
"Oncology Rooming Note    July 27, 2023 3:09 PM   Shaneka Alvarez is a 63 year old female who presents for:    Chief Complaint   Patient presents with    Oncology Clinic Visit     Follow up     Initial Vitals: BP (!) 144/102 (BP Location: Left arm, Patient Position: Chair, Cuff Size: Adult Regular)   Pulse 93   Temp 97.5  F (36.4  C) (Oral)   Ht 1.575 m (5' 2\")   Wt 55.8 kg (123 lb)   SpO2 98%   BMI 22.50 kg/m   Estimated body mass index is 22.5 kg/m  as calculated from the following:    Height as of this encounter: 1.575 m (5' 2\").    Weight as of this encounter: 55.8 kg (123 lb). Body surface area is 1.56 meters squared.  Severe Pain (6) Comment: Data Unavailable   No LMP recorded. Patient is postmenopausal.  Allergies reviewed: Yes  Medications reviewed: Yes    Medications: Medication refills not needed today.  Pharmacy name entered into [facility]:    [facility] - [place] - [address] [place] [address] [facility] - [place], [place] - [address] [facility] [place] [address]  [facility] [place] [address] [facility]    Clinical concerns: Yes  Annie was notified.      Eliza Thakur CMA                    "

## 2023-07-27 NOTE — LETTER
7/27/2023         RE: Shaneka Alvarez  [address] [facility] [address]        Dear Colleague,    Thank you for referring your patient, Shaneka Alvarez, to the [facility]. Please see a copy of my visit note below.    Oncology Follow Up Visit: July 27, 2023     Oncologist: Dr Dewayne Zepeda  PCP: Mohit Barcenas    Diagnosis: Metastatic Breast Cancer to brain and spine  Shaneka Alvarez is a 62 yo female who was diagnosed with Stage IIB, T2 N1 M0 invasive lobular carcinoma of the right breast  In January 2006.  Final pathology showed a 4.5 x 3.8 x 2.5 cm, 01/14 lymph nodes positive.  Estrogen, progesterone receptor positive, HER-2 negative.     Genetics- BRCA1 and 2 mutations not detected. 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  There was no evidence of new multiple sclerosis lesions.  PET scan on 8/30/2019 which showed widespread bone metastatic lesions (calvarium, spine, sacrum, pelvis, ribs most prominent at C7, T3, L1 and L4), hypermetabolic 3 cm lesion in LLL, and mediastinal/hilar LN. CEA wnl at 1.9 and C27-29 elevated to 415 (28 on 8/23/16). A CT guided biopsy of the right iliac bone was obtained on 9/4/19, pathology consistent with metastatic adenocarcinoma (breast), ER/ND negative, HER-2 negative PDL 1 < 1%. A second biopsy was take of the LLL nodule via EBUS on 9/5/19 did not show any malignancy.  C/T/L spine MRI 8/3/19 to 9/2/19 with numerous enhancing lesions of the C, T and L spine, largest around T9 and L1. No evidence of cord compression. Has a L1 compression fracture and impending L4. completed radiation therapy to T12-L5.-Neurosurgery recommended no surgical intervention but to wear [place] brace when out of bed and HOB >30 degrees  Treatment:   2006:  ECOG 2104 protocol of dose-dense Adriamycin,  Cytoxan and Avastin x4 cycles followed by Taxol and Avastin x4 cycles.   03/2007:  Completed 1 year Avastin.   11/2006-01/2007:  Radiotherapy to the right breast of 5040 cGy.   [id number]:  Aromasin.  Stopped after moving to the [facility].   01/2016:  Right modified radical mastectomy with latissimus dorsi flap reconstruction and a left prophylactic mastectomy with latissimus dorsi flap reconstruction.   9/6/2019 till 9/18/2019 Received radiation T12-L5 3000 cGy in 10 fractions   -Neurosurgery recommended no surgical intervention but to wear [place] brace when out of bed and HOB >30 degrees  9/18/2019 completed T12-L5 radiation in 10 fractions =3000 cGy  10/1/2019 Xeloda x 2 trials lowering dose to 1000 mg bid  10/23/2019 Began every 3 month Zometa  11/5/2019- 1/14/2020  weekly paclitaxel x 3 cycles  1/28-10/2020 Eribulin  11/11/2020- 12/2021 Trodelvy  Repeat biopsy done from the right acetabulum on 2/25/2021 which showed metastatic lobular breast cancer.  Hormonal studies, HER-2 FISH again showed that it is triple negative.  Androgen receptor is diffusely positive.    PD-L1 negative.  Foundation 1 testing did not reveal any actionable mutations  1-3/2022  bicalutamide with bony progression  4/2022 began liposomal doxorubicin - dose decrease to 80% at 2nd cycle in light of side effects. Tolerated that well. Complete response on scan Oct 2022    Interval History: Ms. Alvarez comes to clinic for review of new and progressive generalized weakness,  increased sweating but without fevers that she has caught, constipation until this morning, and just not feeling well. Admits that she may have some depression about climbing cancer marker. Had migraines x 3 days over weekend and is not able to use the medication that resolves the the migraine due to insurance coverage but no current headaches. Sweats have been much worse lately and having this in office with clothing affected. Not eating well with texture a problem but  "no nausea- using protein drinks if other food not looking good- weight loss since last visit 6 days ago. Denies fevers, mental status changes, vision changes, chest pain or SOB. No Abdominal pain or blood in the stools. Urinating normal- no blood noted but reports may be darker than usual. No rashes or skin changes. Has chronic low back pain- no new neck pain.  Rest of comprehensive and complete ROS is reviewed and is negative.   Past Medical History:   Diagnosis Date     Breast cancer (H)     Age 44     Cataract     left eye     Chemotherapy induced nausea and vomiting 12/15/2020     Common migraine      Esophageal reflux      H/O bilateral mastectomy      Mild major depression (H)      Multiple sclerosis (H)      Pulmonary embolism (H)      Current Outpatient Medications   Medication     apixaban ANTICOAGULANT (ELIQUIS ANTICOAGULANT) 5 MG tablet     Buprenorphine HCl (BELBUCA) 300 MCG FILM buccal film     busPIRone (BUSPAR) 15 MG tablet     calcium citrate-vitamin D (CITRACAL) 315-250 MG-UNIT TABS     Cholecalciferol (VITAMIN D3 PO)     galcanezumab-gnlm (EMGALITY) 120 MG/ML injection     ketorolac (TORADOL) 15 MG/ML injection     LORazepam (ATIVAN) 0.5 MG tablet     LORazepam (ATIVAN) 1 MG tablet     metoclopramide (REGLAN) 5 MG tablet     morphine (MSIR) 15 MG IR tablet     Needle, Disp, 27G X 1/2\" MISC     OLANZapine (ZYPREXA) 5 MG tablet     ondansetron (ZOFRAN ODT) 4 MG ODT tab     propranolol ER (INDERAL LA) 80 MG 24 hr capsule     traZODone (DESYREL) 50 MG tablet     ubrogepant (UBRELVY) 100 MG tablet     venlafaxine (EFFEXOR XR) 75 MG 24 hr capsule     vitamin B-2 (RIBOFLAVIN) 25 MG TABS tablet     Zavegepant HCl 10 MG/ACT SOLN     No current facility-administered medications for this visit.     Facility-Administered Medications Ordered in Other Visits   Medication     heparin 100 unit/mL injection 5 mL     sodium chloride (PF) 0.9% PF flush 10 mL     Allergies   Allergen Reactions     Bactrim " "[Sulfamethoxazole-Trimethoprim]      Internal bleeding     Copaxone [Glatiramer] Hives       Physical Exam: BP (!) 144/102 (BP Location: Left arm, Patient Position: Chair, Cuff Size: Adult Regular)   Pulse 93   Temp 97.5  F (36.4  C) (Oral)   Ht 1.575 m (5' 2\")   Wt 55.8 kg (123 lb)   SpO2 98%   BMI 22.50 kg/m   - weight down 7 # in last 6 days.  Constitutional: Alert but diaphoretic and in no distress. Able to get around per self.   ENT: Eyes grossly normal to inspection.  No discharge or erythema, or obvious scleral/conjunctival abnormalities. No mouth sores. TMs clear. No sinus congestion.  Neck: Supple, No adenopathy.  Cardiac: Heart rate and rhythm is regular with normal S1 and S2 without murmur  Respiratory: Breathing easy. Lung sounds clear to auscultation- no cough  Breasts:  Not completed today  Abdomen: Soft, non-tender, BS normal. No masses or organomegaly  MS: Muscle tone normal. extremities normal with no edema. Moving well by self but had  in  bathroom while bathing due to weakness but no falls.   Skin: No suspicious lesions or rashes  Neuro: Sensory grossly WNL, gait normal.   Lymph: Normal ant/post cervical, axillary, supraclavicular nodes  Psych: Mentation appears normal and affect normal/bright with good conversation.  Normal speech and appearance well-groomed.    Laboratory Results:   Results for orders placed or performed in visit on 07/27/23   Comprehensive metabolic panel     Status: Abnormal   Result Value Ref Range    Sodium 136 136 - 145 mmol/L    Potassium 4.0 3.4 - 5.3 mmol/L    Chloride 98 98 - 107 mmol/L    Carbon Dioxide (CO2) 23 22 - 29 mmol/L    Anion Gap 15 7 - 15 mmol/L    Urea Nitrogen 10.3 8.0 - 23.0 mg/dL    Creatinine 0.58 0.51 - 0.95 mg/dL    Calcium 9.0 8.8 - 10.2 mg/dL    Glucose 114 (H) 70 - 99 mg/dL    Alkaline Phosphatase 159 (H) 35 - 104 U/L    AST 32 0 - 45 U/L    ALT 16 0 - 50 U/L    Protein Total 7.1 6.4 - 8.3 g/dL    Albumin 4.5 3.5 - 5.2 g/dL    " Bilirubin Total 0.5 <=1.2 mg/dL    GFR Estimate >90 >60 mL/min/1.73m2   CBC with platelets and differential     Status: Abnormal   Result Value Ref Range    WBC Count 24.1 (H) 4.0 - 11.0 10e3/uL    RBC Count 4.90 3.80 - 5.20 10e6/uL    Hemoglobin 14.5 11.7 - 15.7 g/dL    Hematocrit 43.1 35.0 - 47.0 %    MCV 88 78 - 100 fL    MCH 29.6 26.5 - 33.0 pg    MCHC 33.6 31.5 - 36.5 g/dL    RDW 14.7 10.0 - 15.0 %    Platelet Count 183 150 - 450 10e3/uL   Manual Differential     Status: Abnormal   Result Value Ref Range    % Neutrophils 93 %    % Lymphocytes 5 %    % Monocytes 2 %    % Eosinophils 0 %    % Basophils 0 %    Absolute Neutrophils 22.4 (H) 1.6 - 8.3 10e3/uL    Absolute Lymphocytes 1.2 0.8 - 5.3 10e3/uL    Absolute Monocytes 0.5 0.0 - 1.3 10e3/uL    Absolute Eosinophils 0.0 0.0 - 0.7 10e3/uL    Absolute Basophils 0.0 0.0 - 0.2 10e3/uL    RBC Morphology Confirmed RBC Indices     Platelet Assessment  Automated Count Confirmed. Platelet morphology is normal.     Automated Count Confirmed. Platelet morphology is normal.   CRP inflammation     Status: Normal   Result Value Ref Range    CRP Inflammation <3.00 <5.00 mg/L   Erythrocyte sedimentation rate auto     Status: Normal   Result Value Ref Range    Erythrocyte Sedimentation Rate 7 0 - 30 mm/hr   CBC with platelets and differential     Status: Abnormal   Result Value Ref Range    WBC Count 22.3 (H) 4.0 - 11.0 10e3/uL    RBC Count 4.79 3.80 - 5.20 10e6/uL    Hemoglobin 14.2 11.7 - 15.7 g/dL    Hematocrit 42.1 35.0 - 47.0 %    MCV 88 78 - 100 fL    MCH 29.6 26.5 - 33.0 pg    MCHC 33.7 31.5 - 36.5 g/dL    RDW 14.7 10.0 - 15.0 %    Platelet Count 174 150 - 450 10e3/uL    % Neutrophils 83 %    % Lymphocytes 4 %    % Monocytes 8 %    % Eosinophils 0 %    % Basophils 1 %    % Immature Granulocytes 4 %    NRBCs per 100 WBC 0 <1 /100    Absolute Neutrophils 18.5 (H) 1.6 - 8.3 10e3/uL    Absolute Lymphocytes 1.0 0.8 - 5.3 10e3/uL    Absolute Monocytes 1.7 (H) 0.0 - 1.3 10e3/uL     Absolute Eosinophils 0.1 0.0 - 0.7 10e3/uL    Absolute Basophils 0.2 0.0 - 0.2 10e3/uL    Absolute Immature Granulocytes 0.9 (H) <=0.4 10e3/uL    Absolute NRBCs 0.0 10e3/uL   CBC with platelets differential     Status: Abnormal    Narrative    The following orders were created for panel order CBC with platelets differential.  Procedure                               Abnormality         Status                     ---------                               -----------         ------                     CBC with platelets and d...[[id number]]  Abnormal            Final result               Manual Differential[[id number]]          Abnormal            Final result                 Please view results for these tests on the individual orders.   CBC with platelets differential     Status: Abnormal    Narrative    The following orders were created for panel order CBC with platelets differential.  Procedure                               Abnormality         Status                     ---------                               -----------         ------                     CBC with platelets and d...[[id number]]  Abnormal            Final result                 Please view results for these tests on the individual orders.   Results for orders placed or performed in visit on 07/27/23   UA Macroscopic with reflex to Microscopic and Culture     Status: Abnormal    Specimen: Urine, NOS   Result Value Ref Range    Color Urine Dark Yellow (A) Colorless, Straw, Light Yellow, Yellow    Appearance Urine Clear Clear    Glucose Urine Negative Negative mg/dL    Bilirubin Urine Small (A) Negative    Ketones Urine 150 (A) Negative mg/dL    Specific Gravity Urine 1.027 0.999 - 1.035    Blood Urine Negative Negative    pH Urine 6.0 5.0 - 7.0    Protein Albumin Urine 50 (A) Negative mg/dL    Urobilinogen Urine Normal Normal, 2.0 mg/dL    Nitrite Urine Negative Negative    Leukocyte Esterase Urine Negative Negative    SKIP     UA Microscopic with  Reflex to Culture     Status: Abnormal   Result Value Ref Range    RBC Urine 2-5 (A) 0-2 /HPF /HPF    WBC Urine 0-5 0-5 /HPF /HPF    Granular Casts Urine 0-2 (A) None Seen /LPF    Narrative    Urine Culture not indicated       Assessment and Plan:   New generalized weakness and diaphoresis- Pt has significant rise in WBCs in last 6 days with the new symptoms of weakness and sweats but no obvious area of infection.   Still awaiting GGT and respiratory panel PCR as well as chest xray results however pt has not respiratory symptoms. Inflammatory markers are normal and repeat WBC count shows consistency with significant rise in neutrophils- left shift.   WIll send pt home with Levaquin 500 mg po daily x5 and given instructions to watch for fevers and if weakness progresses or new symptoms she can go to ER for imaging and further investigation otherwise will have PET/CT as set for 8/8/2023.  Will contact her with results of GGT and respiratory results.   Low back pain- ongoing- using buprenorphine every 12 hours- does not use tylenol or ibuprofen. Not progressing and stable for now.   Metastatic Breast Cancer to brain and spine-Pt continues use of Doxil monthly with good tolerance to plan and no new complaints related to infusions.  Her cancer marker is trending up again:   Latest Reference Range & Units Most Recent 04/28/23 12:48 05/26/23 11:40 06/23/23 10:08   Cancer Antigen 15-3 <=25 U/mL 186 (H)  7/21/23 11:02 115 (H) 161 (H) 177 (H)   (H): Data is abnormally high  She is to have PET scan/ CT next week.   Weight loss- down 7 # this week- textures bothering her. Referral to dietician made with pt approval.   Adjustment disorder with depressed mood- Agrees with  that this is true. WIll refer to Dr Rodriguez. Discussed meds but will meet with psychologist first- already on buspar.   Migraines- recurrent- was down for 3 days with currently on emgality and has tordol but do not feel they are effective.  Migraines  followed by PCP.   The total time of this encounter amounted to 60 minutes. This time included face to face time spent with the patient, prep work, ordering tests, and performing post visit documentation.  Annie Bailon Cnp        Again, thank you for allowing me to participate in the care of your patient.        Sincerely,        Annie Bailon NP, APRN CNP
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
## 2023-07-31 PROBLEM — R52 INTRACTABLE PAIN: Status: ACTIVE | Noted: 2023-01-01

## 2023-07-31 NOTE — MEDICATION SCRIBE - ADMISSION MEDICATION HISTORY
Medication Scribe Admission Medication History    Admission medication history is complete. The information provided in this note is only as accurate as the sources available at the time of the update.    Medication reconciliation/reorder completed by provider prior to medication history? No    Information Source(s): Patient, Hospital records, and CareEverywhere/SureScripts via  Patient's cell phone in room as room phone would not reach patient.    Pertinent Information: Patient did not get scripts filled for Zavegepant spray or Ketorolac as not covered by insurance.  Supplements have been on hold for a few days.  Had Levofloxacin day 3 of 5 on 7/30/2023.    Changes made to PTA medication list:  Added: None  Deleted: Lorazepam 1 mg tab(has 0.5 mg tab)  Changed: None    Medication Affordability:  Not including over the counter (OTC) medications, was there a time in the past 3 months when you did not take your medications as prescribed because of cost?: Yes (Zavegepant and Ketorolac)      Allergies reviewed with patient and updates made in EHR: yes, no changes.    Medication History Completed By: Dilia Damon 7/31/2023 4:11 PM    Prior to Admission medications    Medication Sig Last Dose Taking? Auth Provider Long Term End Date   apixaban ANTICOAGULANT (ELIQUIS ANTICOAGULANT) 5 MG tablet Take 1 tablet (5 mg) by mouth 2 times daily 7/30/2023 at am Yes Dewayne Zepeda MD     Buprenorphine HCl (BELBUCA) 300 MCG FILM buccal film Place 1 Film (300 mcg) inside cheek every 12 hours 7/30/2023 at am Yes Alva Whitaker MD     busPIRone (BUSPAR) 15 MG tablet Take 1 tablet (15 mg) by mouth 2 times daily 7/30/2023 at am Yes Spenser Landeros PA-C Yes    calcium citrate-vitamin D (CITRACAL) 315-250 MG-UNIT TABS Take 1 tablet by mouth every morning  Past Week at am on hold Yes Reported, Patient     Cholecalciferol (VITAMIN D3 PO) Take 2,000 Units by mouth every morning  Past Week at am on hold Yes Reported, Patient    "  galcanezumab-gnlm (EMGALITY) 120 MG/ML injection Inject 240 mg subcutaneous first month and then inject 120 mg subcutaneous every 28 days 7/18/2023 at self injects Yes Emelia Mixon APRN CNP     levofloxacin (LEVAQUIN) 500 MG tablet Take 1 tablet (500 mg) by mouth daily 7/30/2023 at am day 3 of 5 Yes Annie Bailon APRN CNP     LORazepam (ATIVAN) 0.5 MG tablet Take 1-2 tablets (0.5-1 mg) by mouth every 6 hours as needed for anxiety Past Month at unknown Yes Joi Diamond PA-C     metoclopramide (REGLAN) 5 MG tablet Take 1-2 tablets (5-10 mg) by mouth every 6 hours as needed (nausea/vomiting/migraine) 7/30/2023 at unknown Yes Emelia Mixon APRN CNP     morphine (MSIR) 15 MG IR tablet Take 1-2 tablets (15-30 mg) by mouth every 3 hours as needed for pain 7/30/2023 at pm Yes Annie Bailon APRN CNP     Needle, Disp, 27G X 1/2\" MISC 1 each at onset of headache (migraine) 7/18/2023 at unknown Yes Emelia Mixon APRN CNP     OLANZapine (ZYPREXA) 5 MG tablet Take 2.5-5 mg by mouth 2 times daily as needed for nausea Past Month at unknown Yes Reported, Patient No    ondansetron (ZOFRAN ODT) 4 MG ODT tab Take 4 mg by mouth every 8 hours as needed for nausea Past Week at unknown Yes Reported, Patient     propranolol ER (INDERAL LA) 80 MG 24 hr capsule Take 1 capsule by mouth in the morning and 2 capsules by mouth at night if tolerated 7/30/2023 at am Yes Emelia Mixon APRN CNP Yes    traZODone (DESYREL) 50 MG tablet TAKE 1 TABLET BY MOUTH ONCE DAILY AT BEDTIME  Patient taking differently: Take 50 mg by mouth At Bedtime 7/29/2023 at hs Yes Annie Bailon APRN CNP Yes    ubrogepant (UBRELVY) 100 MG tablet Take 1 tablet (100 mg) by mouth at onset of headache (may repeat in 2 hours as needed. Max 200 mg in 24 hours.) Past Week at unknown Yes Emelia Mixon APRN CNP     venlafaxine (EFFEXOR XR) 75 MG 24 hr capsule Take 3 " capsules (225 mg) by mouth daily 7/30/2023 at am Yes Spenser Landeros PA-C Yes    vitamin B-2 (RIBOFLAVIN) 25 MG TABS tablet Take 1 tablet by mouth daily Past Week at am on hold Yes Reported, Patient     ketorolac (TORADOL) 15 MG/ML injection Inject 1 mL (15 mg) into the muscle once as needed (migraine severe) didn't get this$  Emelia Mixon APRN CNP     Zavegepant HCl 10 MG/ACT SOLN Spray 10 mg in nostril See Admin Instructions Spray a single spray in one nostril, as needed; MAX one spray /24 hour period didn't get this $  Emelia Mixon APRN CNP

## 2023-07-31 NOTE — PROGRESS NOTES
S-(situation): Patient arrives to room 255 via cart from ED    B-(background): neck/back pain, weakness     A-(assessment): vss, room air, back pain    R-(recommendations): Orders reviewed with patient. Will monitor patient per MD orders.     Inpatient nursing criteria listed below were met:    Health care directives status obtained and documented: Yes  VTE ordered/documented: Yes  Skin issues/needs documented:Yes  Isolation addressed and Signage used: NA  Fall Prevention: Care plan updated Yes Education given and documented NA  Care Plan initiated and Co-Morbidities added: No  Education Assessment documented:Yes  Admission Education Documented: Yes  If present CAUTI/CLABI Education done: Yes  New medication patient education completed and documented (Possible Side Effects of Common Medications handout): Yes  Allergies Reviewed: Yes  Admission Medication Reconciliation completed: Yes  Home medications if not able to send immediately home with family stored here: NA  Reminder note placed in discharge instructions regarding home meds: NA  Individualized care needs/preferences addressed and charted: Yes  Provider Notified that patient has arrived to the unit: Yes

## 2023-07-31 NOTE — ED TRIAGE NOTES
Pt had a fall last night, landing face first on sidewalk. Unsure of LOC.   C/O neck and back pain. Additional report of numbness/ tingling of BLE  Is on blood thinners.   C'collar placed in triage.      Triage Assessment       Row Name 07/31/23 1018       Triage Assessment (Adult)    Airway WDL WDL       Respiratory WDL    Respiratory WDL WDL       Peripheral/Neurovascular WDL    Peripheral Neurovascular WDL WDL

## 2023-07-31 NOTE — H&P
[facility]    History and Physical  Hospital Medicine       Date of Admission:  7/31/2023  Date of Service: 7/31/2023     Assessment & Plan   Shaneka Alvarez is a 63 year old female who presents on 7/31/2023 with complaint of back and neck pain after a fall last evening. Pain is worsening of chronic pain.    Uncontrolled pain  Fall  Breast Cancer with bone metastases effecting spine  Multiple chronic spinal fractures, though no new fractures per today's imaging  Follows with Palliative Care for pain control  Resume home pain regimen with buprenorphine BID, Morphine 15-30mg Q3H  Normal acceptable pain level is 3-4/10, currently 6-8/10  PT and OT evaluate and treat    History of PE  Continue Eliquis    Migraine  Continue home medications: Ubrelvy, Reglan and diphenhydramine   Continue Propranolol, Buspar    Multiple Sclerosis  MRI Brain 7/6/23 no significant change in demyelinating disease      Principal Problem:    Bone metastases  Active Problems:    Metastatic breast cancer    Cancer associated pain    Intractable pain    Fall, initial encounter    Clinically Significant Risk Factors Present on Admission                 # Drug Induced Coagulation Defect: home medication list includes an anticoagulant medication    # Thrombocytopenia: Lowest platelets = 121 in last 2 days, will monitor for bleeding                      Diet: NPO for Medical/Clinical Reasons Except for: Meds    DVT Prophylaxis: DOAC  Huntley Catheter: Not present  Code Status:  Full Code  Lines: Port a Cath, left chest    Disposition Plan      Expected Discharge Date: 08/01/2023             Entered: GUTIERREZ MILLER CNP 07/31/2023, 4:31 PM       The patient's care was discussed with the Attending Physician, Dr. Crawford .  I have discussed patient and formulated plan with attending hospitalist physician, GUTIERREZ Taveras CNP        Primary Care Physician   Clinic, [facility]  "[phone]    History is obtained from the patient and review the EMR.    History of Present Illness   Shaneka Alvarez is a 63 year old female with past medical history of Metastatic Breast Cancer, MS, history of PE now presents on 7/31/2023 with complaint of intolerable pain after a fall last evening.  Shaneka was returning to her house from the [facility] with her dog on a lead. The dog ran off the path toward the woods and pulled Shaneka down on her front side. She struck her head on a lawn cart, she denies any loss of consciousness. Her  was able to assist her to her feet and into bed. Due to severe back pain she was not able to get up today. She describes the pain as \"my normal pain, but worse\", she denies any new pain areas. Pan CT scans were negative for new findings.    Review of Systems   CONSTITUTIONAL: NEGATIVE for fever, chills, change in weight  INTEGUMENTARY/SKIN: NEGATIVE for worrisome rashes, moles or lesions  ENT/MOUTH: NEGATIVE for ear, mouth and throat problems  RESP: NEGATIVE for significant cough or SOB  CV: NEGATIVE for chest pain, palpitations or peripheral edema  GI: NEGATIVE for nausea, abdominal pain, heartburn, or change in bowel habits  : negative for, dysuria, frequency, and hematuria  MUSCULOSKELETAL: POSITIVE  for back pain chronic and Hx fracture and NEGATIVE for muscle weakness and paresthesias  NEURO: NEGATIVE for weakness, dizziness or paresthesias    Past Medical History      Past Medical History:   Diagnosis Date    Breast cancer (H)     Age 44    Cataract     left eye    Chemotherapy induced nausea and vomiting 12/15/2020    Common migraine     Esophageal reflux     H/O bilateral mastectomy     Mild major depression (H)     Multiple sclerosis (H)     Pulmonary embolism (H)      Patient Active Problem List    Diagnosis Date Noted    Intractable pain 07/31/2023     Priority: Medium    Fall, initial encounter 07/31/2023     Priority: Medium    Left-sided low back pain with " left-sided sciatica 04/05/2023     Priority: Medium    SI (sacroiliac) joint dysfunction 04/05/2023     Priority: Medium    Chronic bilateral low back pain without sciatica 04/05/2023     Priority: Medium    Elevated serum creatinine 03/18/2022     Priority: Medium    Hyponatremia 03/17/2022     Priority: Medium    Vomiting 03/17/2022     Priority: Medium    Bony metastasis 03/17/2022     Priority: Medium    Acute kidney injury (H) 03/17/2022     Priority: Medium    Sepsis without acute organ dysfunction (H)-possible  03/17/2022     Priority: Medium    Closed fracture of pedicle of lumbar vertebra, initial encounter (H) 03/17/2022     Priority: Medium    Acute midline low back pain, unspecified whether sciatica present 03/17/2022     Priority: Medium    Anemia, unspecified type 10/18/2021     Priority: Medium    Hyperphosphatemia 07/28/2021     Priority: Medium    Drug-induced polyneuropathy (H) 02/18/2021     Priority: Medium    Cancer associated pain 02/03/2021     Priority: Medium    Posterior subcapsular polar age-related cataract of both eyes 01/29/2021     Priority: Medium     Added automatically from request for surgery [id number]      Iris synechiae, bilateral 01/29/2021     Priority: Medium     Added automatically from request for surgery [id number]      Dehydration 12/15/2020     Priority: Medium    Chemotherapy induced nausea and vomiting 12/15/2020     Priority: Medium    Sinus tachycardia 12/15/2020     Priority: Medium    Drug-induced nausea and vomiting 12/15/2020     Priority: Medium    Chemotherapy-induced neutropenia (H) 11/10/2020     Priority: Medium    Pulmonary embolism without acute cor pulmonale, unspecified chronicity, unspecified pulmonary embolism type (H) 08/17/2020     Priority: Medium    Hypokalemia 07/28/2020     Priority: Medium    Bone metastases 10/22/2019     Priority: Medium    Metastatic breast cancer 09/18/2019     Priority: Medium    Metastatic disease (H) 08/31/2019     Priority:  Medium    S/P breast reconstruction, bilateral 07/31/2018     Priority: Medium    Anxiety and depression 10/25/2017     Priority: Medium    Hx of breast cancer 10/25/2017     Priority: Medium    Major depressive disorder, recurrent episode, moderate (H) 04/07/2016     Priority: Medium    Anxiety 03/12/2016     Priority: Medium    Migraine without aura and without status migrainosus, not intractable 10/23/2015     Priority: Medium    Obesity, Class II, BMI 35-39.9 10/23/2015     Priority: Medium    Multiple sclerosis (H) 08/11/2015     Priority: Medium    CARDIOVASCULAR SCREENING; LDL GOAL LESS THAN 160 08/11/2015     Priority: Medium    Raynaud's syndrome 08/11/2015     Priority: Medium    Breast cancer (H) 08/11/2015     Priority: Medium     Age 44      Complication of anesthesia 08/11/2015     Priority: Medium    Arthritis 08/11/2015     Priority: Medium    Autoimmune disorder (H) 08/11/2015     Priority: Medium    Other insomnia 08/11/2015     Priority: Medium    Medication management contract agreement 08/11/2015     Priority: Medium     Ambien 12.5 mg, dispense 30 per month, follow up every 6 months       Neurogenic bladder 12/22/2014     Priority: Medium    Vision changes 12/22/2014     Priority: Medium    Demyelinating disorder (H) 12/22/2014     Priority: Medium    Weakness 11/20/2014     Priority: Medium    GERD (gastroesophageal reflux disease) 10/22/2014     Priority: Medium    History of breast cancer 10/22/2014     Priority: Medium     Overview:   stage 3, diagnosed in 2006 s/p double mastectomy, chemo and radiation.       Migraine 10/22/2014     Priority: Medium        Past Surgical History     Past Surgical History:   Procedure Laterality Date    CHOLECYSTECTOMY      ENDOBRONCHIAL ULTRASOUND FLEXIBLE N/A 09/05/2019    Procedure: Flexible Bronchoscopy, Endobronchial Ultrasound, Radial Probe;  Surgeon: Sree Sanchez MD;  Location: [facility] OR     REMOVAL OF OVARY/TUBE(S)      HERNIA REPAIR,  "UMBILICAL      mastectomy  Bilateral 2006    MASTECTOMY, BILATERAL      PHACOEMULSIFICATION CLEAR CORNEA WITH TORIC INTRAOCULAR LENS IMPLANT Left 2021    Procedure: PHACOEMULSIFICATION, COMPLEX CATARACT, WITH INTRAOCULAR LENS IMPLANT, RESIDENT TORIC LENS LEFT;  Surgeon: Luis Barkley MD;  Location: [facility] [place]    PHACOEMULSIFICATION CLEAR CORNEA WITH TORIC INTRAOCULAR LENS IMPLANT Right 3/8/2021    Procedure: PHACOEMULSIFICATION, CATARACT, WITH INTRAOCULAR LENS IMPLANT, TORIC LENS RIGHT;  Surgeon: Luis Barkley MD;  Location: [facility] [place]        Prior to Admission Medications   Prior to Admission Medications   Prescriptions Last Dose Informant Patient Reported? Taking?   Buprenorphine HCl (BELBUCA) 300 MCG FILM buccal film 2023 at am  No Yes   Sig: Place 1 Film (300 mcg) inside cheek every 12 hours   Cholecalciferol (VITAMIN D3 PO) Past Week at am on hold Self Yes Yes   Sig: Take 2,000 Units by mouth every morning    LORazepam (ATIVAN) 0.5 MG tablet Past Month at unknown  No Yes   Sig: Take 1-2 tablets (0.5-1 mg) by mouth every 6 hours as needed for anxiety   Needle, Disp, 27G X 1/2\" MISC 2023 at unknown  No Yes   Si each at onset of headache (migraine)   OLANZapine (ZYPREXA) 5 MG tablet Past Month at unknown Self Yes Yes   Sig: Take 2.5-5 mg by mouth 2 times daily as needed for nausea   Zavegepant HCl 10 MG/ACT SOLN didn't get this $  No No   Sig: Spray 10 mg in nostril See Admin Instructions Spray a single spray in one nostril, as needed; MAX one spray /24 hour period   apixaban ANTICOAGULANT (ELIQUIS ANTICOAGULANT) 5 MG tablet 2023 at am  No Yes   Sig: Take 1 tablet (5 mg) by mouth 2 times daily   busPIRone (BUSPAR) 15 MG tablet 2023 at am  No Yes   Sig: Take 1 tablet (15 mg) by mouth 2 times daily   calcium citrate-vitamin D (CITRACAL) 315-250 MG-UNIT TABS Past Week at am on hold Self Yes Yes   Sig: Take 1 tablet by mouth every morning    galcanezumab-gnlm (EMGALITY) 120 MG/ML " injection 2023 at self injects  No Yes   Sig: Inject 240 mg subcutaneous first month and then inject 120 mg subcutaneous every 28 days   ketorolac (TORADOL) 15 MG/ML injection didn't get this$  No No   Sig: Inject 1 mL (15 mg) into the muscle once as needed (migraine severe)   levofloxacin (LEVAQUIN) 500 MG tablet 2023 at am day 3 of 5  No Yes   Sig: Take 1 tablet (500 mg) by mouth daily   metoclopramide (REGLAN) 5 MG tablet 2023 at unknown  No Yes   Sig: Take 1-2 tablets (5-10 mg) by mouth every 6 hours as needed (nausea/vomiting/migraine)   morphine (MSIR) 15 MG IR tablet 2023 at pm Self No Yes   Sig: Take 1-2 tablets (15-30 mg) by mouth every 3 hours as needed for pain   ondansetron (ZOFRAN ODT) 4 MG ODT tab Past Week at unknown Self Yes Yes   Sig: Take 4 mg by mouth every 8 hours as needed for nausea   propranolol ER (INDERAL LA) 80 MG 24 hr capsule 2023 at am  No Yes   Sig: Take 1 capsule by mouth in the morning and 2 capsules by mouth at night if tolerated   traZODone (DESYREL) 50 MG tablet 2023 at hs  No Yes   Sig: TAKE 1 TABLET BY MOUTH ONCE DAILY AT BEDTIME   Patient taking differently: Take 50 mg by mouth At Bedtime   ubrogepant (UBRELVY) 100 MG tablet Past Week at unknown Self No Yes   Sig: Take 1 tablet (100 mg) by mouth at onset of headache (may repeat in 2 hours as needed. Max 200 mg in 24 hours.)   venlafaxine (EFFEXOR XR) 75 MG 24 hr capsule 2023 at am Self No Yes   Sig: Take 3 capsules (225 mg) by mouth daily   vitamin B-2 (RIBOFLAVIN) 25 MG TABS tablet Past Week at am on hold Self Yes Yes   Sig: Take 1 tablet by mouth daily      Facility-Administered Medications: None     Allergies   Allergies   Allergen Reactions    Bactrim [Sulfamethoxazole-Trimethoprim] Other (See Comments)     Internal bleeding    Copaxone [Glatiramer] Hives       Family History    Family History   Problem Relation Age of Onset    Breast Cancer Maternal Aunt 50         at 85    Breast  "Cancer Cousin 40    Breast Cancer Mother 49        2nd primary, contralateral breast at 69;  at 86    Melanoma Mother     Breast Cancer Maternal Grandmother 40    Ovarian Cancer Maternal Grandmother     Breast Cancer Paternal Grandmother 50         at 60    Brain Cancer Cousin 20    Breast Cancer Paternal Aunt 50         at 60    Breast Cancer Cousin 45        paternal cousin    Anesthesia Reaction No family hx of     Deep Vein Thrombosis No family hx of        Social History   Social History     Socioeconomic History    Marital status:      Spouse name: Kash    Number of children: 3    Years of education: Not on file    Highest education level: Not on file   Occupational History     Employer: [employer]   Tobacco Use    Smoking status: Former     Types: Cigarettes     Quit date: 2005     Years since quittin.6    Smokeless tobacco: Never   Vaping Use    Vaping Use: Never used   Substance and Sexual Activity    Alcohol use: Not Currently     Alcohol/week: 2.0 standard drinks of alcohol     Types: 1 Glasses of wine, 1 Cans of beer per week    Drug use: No    Sexual activity: Yes     Partners: Male     Birth control/protection: None     Comment: not needed after chemo   Other Topics Concern    Parent/sibling w/ CABG, MI or angioplasty before 65F 55M? Not Asked   Social History Narrative    Not on file     Social Determinants of Health     Financial Resource Strain: Not on file   Food Insecurity: Not on file   Transportation Needs: Not on file   Physical Activity: Not on file   Stress: Not on file   Social Connections: Not on file   Intimate Partner Violence: Not on file   Housing Stability: Not on file       Physical Exam   /77   Pulse 73   Temp 97.6  F (36.4  C) (Oral)   Resp 20   Ht 1.575 m (5' 2\")   Wt 55.8 kg (123 lb)   SpO2 100%   BMI 22.50 kg/m       Weight: 123 lbs 0 oz Body mass index is 22.5 kg/m .     Constitutional: Alert, oriented, cooperative, no apparent " distress, appears nontoxic  Eyes: Eyes are clear, pupils are reactive.  HENT: Oropharynx is clear and moist. No evidence of cranial trauma.  Lymph/Hematologic: No epitrochlear, axillary, anterior or posterior cervical, or supraclavicular lymphadenopathy is appreciated.  Cardiovascular: Regular rate and rhythm, normal S1 and S2, and no murmur noted. JVP is normal. Good peripheral pulses in wrists bilaterally. No lower extremity edema.  Respiratory: Clear to auscultation bilaterally.   GI: Soft, non-tender, normal bowel sounds, no hepatomegaly.  Genitourinary: Deferred  Musculoskeletal: Normal muscle bulk and tone.  Skin: Warm and dry, no rashes.   Neurologic: Neck supple. Cranial nerves are grossly intact.  is symmetric.     Data   Data reviewed today:   Recent Labs   Lab 07/31/23  1044 07/27/23  1620 07/27/23  1504   WBC 6.5 22.3* 24.1*   HGB 11.7 14.2 14.5   MCV 89 88 88   * 174 183   INR 1.20*  --   --      --  136   POTASSIUM 3.7  --  4.0   CHLORIDE 102  --  98   CO2 26  --  23   BUN 7.1*  --  10.3   CR 0.68  --  0.58   ANIONGAP 10  --  15   RAFAELA 8.6*  --  9.0   *  --  114*   ALBUMIN 3.8  --  4.5   PROTTOTAL 6.0*  --  7.1   BILITOTAL 0.3  --  0.5   ALKPHOS 108*  --  159*   ALT 17  --  16   AST 25  --  32       Recent Results (from the past 24 hour(s))   CT Head w/o Contrast    Narrative    CT SCAN OF THE HEAD WITHOUT CONTRAST     CT SCAN OF THE FACE WITHOUT CONTRAST July 31, 2023 11:43 AM     HISTORY: Fall from standing, metastatic cancer to bone/spine, severe  pain, leg numbness, anticoagulated.    TECHNIQUE: Axial images of the head and facial bones were completed  with sagittal and coronal reformations. Radiation dose for this scan  was reduced using automated exposure control, adjustment of the mA  and/or kV according to patient size, or iterative reconstruction  technique.    COMPARISON: Brain MR 7/6/2023. Head CT 3/12/2023.    FINDINGS:   Head CT: No acute intracranial hemorrhage. No  mass effect or midline  shift. Periventricular white matter hypodensities most pronounced in  the left frontal lobe which appears similar to prior MR 7/6/2023 given  differences in technique. Ventricular size is within normal limits  without evidence of hydrocephalus. No abnormal extra axial fluid  collection.    No calvarial fracture appreciated. Sclerotic 0.9 cm lesion in the  frontal bone (series 3 image 23) is unchanged since 3/12/2023.    Facial CT: No significant soft tissue swelling. No facial bone  fracture identified. The temporomandibular joints appear properly  located.     Mild mucosal thickening in the left maxillary sinus.     No mass identified within the visualized soft tissues of the neck.       Impression    IMPRESSION:     Head CT:   1. No evidence of acute intracranial hemorrhage, mass, or herniation.  2. Nonspecific white matter changes which may be due to demyelination.  These are better seen on brain MR 7/6/2023.  3. Sclerotic lesion in the frontal bone, similar to prior CT  3/12/2023. Bony metastases are better seen by MRI.    Facial bone CT: No facial bone fracture.       YESSI GARCIA MD         SYSTEM ID:  [id number]   CT Facial Bones without Contrast    Narrative    CT SCAN OF THE HEAD WITHOUT CONTRAST     CT SCAN OF THE FACE WITHOUT CONTRAST July 31, 2023 11:43 AM     HISTORY: Fall from standing, metastatic cancer to bone/spine, severe  pain, leg numbness, anticoagulated.    TECHNIQUE: Axial images of the head and facial bones were completed  with sagittal and coronal reformations. Radiation dose for this scan  was reduced using automated exposure control, adjustment of the mA  and/or kV according to patient size, or iterative reconstruction  technique.    COMPARISON: Brain MR 7/6/2023. Head CT 3/12/2023.    FINDINGS:   Head CT: No acute intracranial hemorrhage. No mass effect or midline  shift. Periventricular white matter hypodensities most pronounced in  the left frontal lobe which  appears similar to prior MR 7/6/2023 given  differences in technique. Ventricular size is within normal limits  without evidence of hydrocephalus. No abnormal extra axial fluid  collection.    No calvarial fracture appreciated. Sclerotic 0.9 cm lesion in the  frontal bone (series 3 image 23) is unchanged since 3/12/2023.    Facial CT: No significant soft tissue swelling. No facial bone  fracture identified. The temporomandibular joints appear properly  located.     Mild mucosal thickening in the left maxillary sinus.     No mass identified within the visualized soft tissues of the neck.       Impression    IMPRESSION:     Head CT:   1. No evidence of acute intracranial hemorrhage, mass, or herniation.  2. Nonspecific white matter changes which may be due to demyelination.  These are better seen on brain MR 7/6/2023.  3. Sclerotic lesion in the frontal bone, similar to prior CT  3/12/2023. Bony metastases are better seen by MRI.    Facial bone CT: No facial bone fracture.       YESSI GARCIA MD         SYSTEM ID:  [id number]   CT Cervical Spine w/o Contrast    Narrative    CT CERVICAL SPINE WITHOUT CONTRAST July 31, 2023 11:45 AM     HISTORY: Fall from standing, metastatic cancer to bone/spine, severe  pain, leg numbness, anticoagulated.     TECHNIQUE: Axial images of the cervical spine were obtained without  intravenous contrast. Multiplanar reformations were performed.  Radiation dose for this scan was reduced using automated exposure  control, adjustment of the mA and/or kV according to patient size, or  iterative reconstruction technique.    COMPARISON: Cervical spine MR 1/31/2020.    FINDINGS: Cervical spine alignment is grossly within normal limits. No  acute lucent fracture line appreciated. No significant loss of  vertebral body height. Mixed lytic and sclerotic lesions in several  vertebral bodies particularly C2, C4, left C6, and C7. Lytic and  sclerotic lesion also seen in the left C5 transverse process.  These  are compatible with bony metastases and were also present on MR  1/31/2020 although comparison is difficult given differences in  technique. Moderate degenerative endplate changes and loss of disc  height at C5-C6 and C6-C7. Mild degenerative endplate changes and loss  of disc height at the other levels. No significant facet hypertrophy.  No significant spinal canal narrowing at any level. Moderate right  neural foraminal narrowing at C5-C6.      Impression    IMPRESSION:   1. No evidence of acute fracture or subluxation in the cervical spine.  2. Bony metastatic disease throughout the cervical spine. No definite  pathologic fracture. Bony metastases were present on MR 1/31/2020.  Comparison to prior MR is difficult given differences in technique.    YESSI AGRCIA MD         SYSTEM ID:  [id number]   CT Thoracic Spine w/o Contrast    Narrative    CT THORACIC SPINE WITHOUT CONTRAST     CT LUMBAR SPINE WITHOUT CONTRAST  July 31, 2023 11:47 AM     HISTORY: Fall from standing, metastatic cancer to bone/spine, severe  pain, leg numbness, anticoagulated.     TECHNIQUE: Axial images of the thoracic spine were obtained without  intravenous contrast. Multiplanar reformations were performed.  Radiation dose for this scan was reduced using automated exposure  control, adjustment of the mA and/or kV according to patient size, or  iterative reconstruction technique.    Axial images of the lumbar spine were obtained without intravenous  contrast. Multiplanar reformations were performed.  Radiation dose for  this scan was reduced using automated exposure control, adjustment of  the mA and/or kV according to patient size, or iterative  reconstruction technique.    COMPARISON: PET/CT 3/27/2023. Lumbar spine CT 3/17/2022. Thoracic  spine x-ray 9/7/2022. Thoracic spine MR 4/3/2020.    FINDINGS:    Thoracic Spine: Diffuse mixed lytic and sclerotic lesions throughout  the thoracic spine compatible with bony metastatic disease. No  acute  lucent fracture lines appreciated in the thoracic spine. Subtle  deformity of the superior right T11 endplate is unchanged and may  represent a Schmorl's node. No new loss of vertebral body height.    Bony expansion of the T9 posterior elements particularly towards the  right contributes to mild spinal canal narrowing. This was also  present on 3/27/2023. Mild to moderate degenerative endplate changes  and loss of disc height throughout the thoracic spine. Small posterior  disc bulge at T5-T6. No significant spinal canal or neural foraminal  narrowing appreciated on a degenerative basis.    Lumbar Spine: There are five lumbar-type vertebral bodies assumed for  the purposes of this dictation.     Mixed lytic and sclerotic lesions in several lumbar vertebral bodies  particularly the L1 vertebral body. Pathologic burst type fracture of  the L1 vertebral body with anterior wedging and loss of approximately  50% vertebral body height appears unchanged since 3/17/2022. Slight  posterior displacement of the fractured superior L1 endplate towards  the spinal canal causing mild spinal canal narrowing. Horizontal  sclerosis likely representing superior endplate fractures also at L3  and L4 vertebral bodies which are similar to prior CT 3/17/2023.  Posterior displacement of the fractured superior L4 endplate towards  the spinal canal causes mild spinal canal narrowing. No new loss of  vertebral body height since 3/17/2023. Presumed Schmorl's node  deformities of the inferior L2 endplate, also present on prior.    Interval healing with sclerosis across previous fractures at the  bilateral L3 pedicles, these were present on 3/17/2022.    Moderate degenerative endplate changes and loss of disc height at  L5-S1. Mild degenerative endplate changes and loss of disc height at  the other levels. Posterior disc bulge more pronounced towards the  left foraminal region at L3-L4. No significant spinal canal narrowing  on a  degenerative basis. Moderate left neural foraminal narrowing at  L3-L4.      Impression    IMPRESSION:    Thoracic spine:  1. Diffuse osseous metastatic disease throughout the thoracic spine.  2. No acute fracture appreciated in the thoracic spine.  3. Bony expansion of the T9 vertebral body from bony metastases  contributes to at least mild spinal canal narrowing. This was also  present on 3/27/2023.  4. Mild degenerative changes throughout the thoracic spine without  significant spinal canal or neural foraminal narrowing appreciated on  a degenerative basis.       Lumbar spine:  1. Diffuse osseous metastatic disease throughout the lumbar spine.  2. No definite new fractures in the lumbar spine.  3. Healing with interval sclerosis across fractures at the L3 pedicles  compared to prior CT 3/17/2022.  4. Additional chronic fractures throughout the lumbar spine with burst  type fracture of L1 and superior endplate fractures of L3 and L4.  These are unchanged.    YESSI GARCIA MD         SYSTEM ID:  [id number]   CT Chest/Abdomen/Pelvis w Contrast    Narrative    CT CHEST/ABDOMEN/PELVIS W CONTRAST 7/31/2023 11:52 AM    CLINICAL HISTORY: Fall from standing, metastatic breast cancer to  bone/spine, severe pain, leg numbness, anticoagulated    TECHNIQUE: CT scan of the chest, abdomen, and pelvis was performed  following injection of IV contrast. Multiplanar reformats were  obtained. Dose reduction techniques were used.   CONTRAST: [id number], 61 mL    COMPARISON: CT exams 3/27/2023 and 3/31/2022    FINDINGS:   LUNGS AND PLEURA: The central airways are clear. Mild parenchymal  scarring. No pneumothorax, pulmonary contusion, pleural fluid or mass.  Newly visualized 5 mm left upper lobe subpleural nodular opacity image  63 series 5.    MEDIASTINUM/AXILLAE: Left-sided port catheter. Bilateral intact breast  implants. Right axillary surgical clips. No thoracic adenopathy.  Calcified mediastinal lymph nodes. Normal heart size and  no  pericardial effusion. Unremarkable major vessels. No mediastinal  hematoma.    HEPATOBILIARY: Cholecystectomy with stable biliary ductal dilatation.  No obstructing mass lesion or radiodense stone. No focal liver lesion.    PANCREAS: Normal.    SPLEEN: Normal.    ADRENAL GLANDS: Normal.    KIDNEYS/BLADDER: Small renal cortical cysts. No follow-up is  indicated. Symmetric heterogeneous appearance of the medial upper pole  renal cortices likely due to timing of the contrast bolus. No  hydronephrosis.    BOWEL: Normal caliber small bowel without focal mural thickening.  Normal appendix. Mild colonic stool burden. No free air or free fluid.    LYMPH NODES: Normal.    VASCULATURE: Unremarkable.    PELVIC ORGANS: Retroflexed uterus. No pelvic free fluid.    MUSCULOSKELETAL: Diffuse lytic and sclerotic osseous lesions are  stable. Multiple bilateral healed rib fractures. Stable L1 and L4  vertebral body compression fractures. No acute osseous injury.      Impression    IMPRESSION:  1.  No acute posttraumatic findings in the chest, abdomen or pelvis.  2.  No evidence of acute osseous injury. Stable old healed bilateral  rib fractures and stable L1 and L4 vertebral body compression  fractures.  3.  Stable widespread osseous metastases.  4.  Newly visualized 5 mm left upper lobe nodular opacity. Recommend  attention on follow-up.    SELINA BELTRAN MD         SYSTEM ID:  [id number]   CT Lumbar Spine w/o Contrast    Narrative    CT THORACIC SPINE WITHOUT CONTRAST     CT LUMBAR SPINE WITHOUT CONTRAST  July 31, 2023 11:47 AM     HISTORY: Fall from standing, metastatic cancer to bone/spine, severe  pain, leg numbness, anticoagulated.     TECHNIQUE: Axial images of the thoracic spine were obtained without  intravenous contrast. Multiplanar reformations were performed.  Radiation dose for this scan was reduced using automated exposure  control, adjustment of the mA and/or kV according to patient size, or  iterative  reconstruction technique.    Axial images of the lumbar spine were obtained without intravenous  contrast. Multiplanar reformations were performed.  Radiation dose for  this scan was reduced using automated exposure control, adjustment of  the mA and/or kV according to patient size, or iterative  reconstruction technique.    COMPARISON: PET/CT 3/27/2023. Lumbar spine CT 3/17/2022. Thoracic  spine x-ray 9/7/2022. Thoracic spine MR 4/3/2020.    FINDINGS:    Thoracic Spine: Diffuse mixed lytic and sclerotic lesions throughout  the thoracic spine compatible with bony metastatic disease. No acute  lucent fracture lines appreciated in the thoracic spine. Subtle  deformity of the superior right T11 endplate is unchanged and may  represent a Schmorl's node. No new loss of vertebral body height.    Bony expansion of the T9 posterior elements particularly towards the  right contributes to mild spinal canal narrowing. This was also  present on 3/27/2023. Mild to moderate degenerative endplate changes  and loss of disc height throughout the thoracic spine. Small posterior  disc bulge at T5-T6. No significant spinal canal or neural foraminal  narrowing appreciated on a degenerative basis.    Lumbar Spine: There are five lumbar-type vertebral bodies assumed for  the purposes of this dictation.     Mixed lytic and sclerotic lesions in several lumbar vertebral bodies  particularly the L1 vertebral body. Pathologic burst type fracture of  the L1 vertebral body with anterior wedging and loss of approximately  50% vertebral body height appears unchanged since 3/17/2022. Slight  posterior displacement of the fractured superior L1 endplate towards  the spinal canal causing mild spinal canal narrowing. Horizontal  sclerosis likely representing superior endplate fractures also at L3  and L4 vertebral bodies which are similar to prior CT 3/17/2023.  Posterior displacement of the fractured superior L4 endplate towards  the spinal canal  causes mild spinal canal narrowing. No new loss of  vertebral body height since 3/17/2023. Presumed Schmorl's node  deformities of the inferior L2 endplate, also present on prior.    Interval healing with sclerosis across previous fractures at the  bilateral L3 pedicles, these were present on 3/17/2022.    Moderate degenerative endplate changes and loss of disc height at  L5-S1. Mild degenerative endplate changes and loss of disc height at  the other levels. Posterior disc bulge more pronounced towards the  left foraminal region at L3-L4. No significant spinal canal narrowing  on a degenerative basis. Moderate left neural foraminal narrowing at  L3-L4.      Impression    IMPRESSION:    Thoracic spine:  1. Diffuse osseous metastatic disease throughout the thoracic spine.  2. No acute fracture appreciated in the thoracic spine.  3. Bony expansion of the T9 vertebral body from bony metastases  contributes to at least mild spinal canal narrowing. This was also  present on 3/27/2023.  4. Mild degenerative changes throughout the thoracic spine without  significant spinal canal or neural foraminal narrowing appreciated on  a degenerative basis.       Lumbar spine:  1. Diffuse osseous metastatic disease throughout the lumbar spine.  2. No definite new fractures in the lumbar spine.  3. Healing with interval sclerosis across fractures at the L3 pedicles  compared to prior CT 3/17/2022.  4. Additional chronic fractures throughout the lumbar spine with burst  type fracture of L1 and superior endplate fractures of L3 and L4.  These are unchanged.    YESSI GARCIA MD         SYSTEM ID:  [id number]       I personally reviewed the CT Scans image(s) showing diffuse osseous metastatic disease throughout the spine, though no new fractures are noted on imaging.

## 2023-07-31 NOTE — TELEPHONE ENCOUNTER
"Writer called pt to assess based on symptomatic mychart. Pt reiterated the scenario described in mychart message and stated that she is still in \"excruciating pain this morning\" and that \"I can still only shuffle my feet and am having a hard time walking\". Her back pain is her primary concern and she feels that \"I feel like I have a fracture\". Writer advised that pt be seen in the ED for urgent evaluation and diagnostic testing due to the type and severity of her symptoms. Pt expressed concern that her  would not allow her to go to the ED, stating that he has told her in the past \"if you can walk then you are fine\".    Writer advised pt that due to her injury from yesterday and the symptoms she is currently experiencing it is highly recommended that she be seen in the ED this morning for evaluation and treatment to avoid further injury. Pt verbalized understanding. Writer offered pt to call clinic back if she is having a difficult time obtaining a ride to the ED. Pt verbalized understanding.  "

## 2023-07-31 NOTE — ED PROVIDER NOTES
History     Chief Complaint   Patient presents with    Fall     HPI  Shaneka Alvarez is a 63 year old female who presents to the emergency room with her  over concern of fall and back and neck pain.  Has significant past medical history for breast cancer that has metastasized to her bone.  She says that she has numerous mets in her spine.  Yesterday, she was walking [place] the [place] when she tripped and fell.  She hit her face against a wagon and hit the ground hard.  Did not lose consciousness.  She says that she has had severe pain and has difficulty with mobility secondary to the pain.  Did try home dose of IR morphine per the , which is an infrequently used medication, but it did not help with her pain.  She continues to have severe pain and feels like she cannot move, stand, or get around without any assistance.  She also takes a blood thinner.    Allergies:  Allergies   Allergen Reactions    Bactrim [Sulfamethoxazole-Trimethoprim]      Internal bleeding    Copaxone [Glatiramer] Hives       Problem List:    Patient Active Problem List    Diagnosis Date Noted    Left-sided low back pain with left-sided sciatica 04/05/2023     Priority: Medium    SI (sacroiliac) joint dysfunction 04/05/2023     Priority: Medium    Chronic bilateral low back pain without sciatica 04/05/2023     Priority: Medium    Elevated serum creatinine 03/18/2022     Priority: Medium    Hyponatremia 03/17/2022     Priority: Medium    Vomiting 03/17/2022     Priority: Medium    Bony metastasis 03/17/2022     Priority: Medium    Acute kidney injury (H) 03/17/2022     Priority: Medium    Sepsis without acute organ dysfunction (H)-possible  03/17/2022     Priority: Medium    Closed fracture of pedicle of lumbar vertebra, initial encounter (H) 03/17/2022     Priority: Medium    Acute midline low back pain, unspecified whether sciatica present 03/17/2022     Priority: Medium    Anemia, unspecified type 10/18/2021      Priority: Medium    Hyperphosphatemia 07/28/2021     Priority: Medium    Drug-induced polyneuropathy (H) 02/18/2021     Priority: Medium    Cancer associated pain 02/03/2021     Priority: Medium    Posterior subcapsular polar age-related cataract of both eyes 01/29/2021     Priority: Medium     Added automatically from request for surgery [id number]      Iris synechiae, bilateral 01/29/2021     Priority: Medium     Added automatically from request for surgery [id number]      Dehydration 12/15/2020     Priority: Medium    Chemotherapy induced nausea and vomiting 12/15/2020     Priority: Medium    Sinus tachycardia 12/15/2020     Priority: Medium    Drug-induced nausea and vomiting 12/15/2020     Priority: Medium    Chemotherapy-induced neutropenia (H) 11/10/2020     Priority: Medium    Pulmonary embolism without acute cor pulmonale, unspecified chronicity, unspecified pulmonary embolism type (H) 08/17/2020     Priority: Medium    Hypokalemia 07/28/2020     Priority: Medium    Bone metastases 10/22/2019     Priority: Medium    Metastatic breast cancer 09/18/2019     Priority: Medium    Metastatic disease (H) 08/31/2019     Priority: Medium    S/P breast reconstruction, bilateral 07/31/2018     Priority: Medium    Anxiety and depression 10/25/2017     Priority: Medium    Hx of breast cancer 10/25/2017     Priority: Medium    Major depressive disorder, recurrent episode, moderate (H) 04/07/2016     Priority: Medium    Anxiety 03/12/2016     Priority: Medium    Migraine without aura and without status migrainosus, not intractable 10/23/2015     Priority: Medium    Obesity, Class II, BMI 35-39.9 10/23/2015     Priority: Medium    Multiple sclerosis (H) 08/11/2015     Priority: Medium    CARDIOVASCULAR SCREENING; LDL GOAL LESS THAN 160 08/11/2015     Priority: Medium    Raynaud's syndrome 08/11/2015     Priority: Medium    Breast cancer (H) 08/11/2015     Priority: Medium     Age 44      Complication of anesthesia 08/11/2015      Priority: Medium    Arthritis 08/11/2015     Priority: Medium    Autoimmune disorder (H) 08/11/2015     Priority: Medium    Other insomnia 08/11/2015     Priority: Medium    Medication management contract agreement 08/11/2015     Priority: Medium     Ambien 12.5 mg, dispense 30 per month, follow up every 6 months       Neurogenic bladder 12/22/2014     Priority: Medium    Vision changes 12/22/2014     Priority: Medium    Demyelinating disorder (H) 12/22/2014     Priority: Medium    Weakness 11/20/2014     Priority: Medium    GERD (gastroesophageal reflux disease) 10/22/2014     Priority: Medium    History of breast cancer 10/22/2014     Priority: Medium     Overview:   stage 3, diagnosed in 2006 s/p double mastectomy, chemo and radiation.       Migraine 10/22/2014     Priority: Medium        Past Medical History:    Past Medical History:   Diagnosis Date    Breast cancer (H)     Cataract     Chemotherapy induced nausea and vomiting 12/15/2020    Common migraine     Esophageal reflux     H/O bilateral mastectomy     Mild major depression (H)     Multiple sclerosis (H)     Pulmonary embolism (H)        Past Surgical History:    Past Surgical History:   Procedure Laterality Date    CHOLECYSTECTOMY      ENDOBRONCHIAL ULTRASOUND FLEXIBLE N/A 09/05/2019    Procedure: Flexible Bronchoscopy, Endobronchial Ultrasound, Radial Probe;  Surgeon: Sree Sanchez MD;  Location: [facility] OR    HC REMOVAL OF OVARY/TUBE(S)      HERNIA REPAIR, UMBILICAL      mastectomy  Bilateral 2006    MASTECTOMY, BILATERAL      PHACOEMULSIFICATION CLEAR CORNEA WITH TORIC INTRAOCULAR LENS IMPLANT Left 2/8/2021    Procedure: PHACOEMULSIFICATION, COMPLEX CATARACT, WITH INTRAOCULAR LENS IMPLANT, RESIDENT TORIC LENS LEFT;  Surgeon: Luis Barkley MD;  Location: [facility] OR    PHACOEMULSIFICATION CLEAR CORNEA WITH TORIC INTRAOCULAR LENS IMPLANT Right 3/8/2021    Procedure: PHACOEMULSIFICATION, CATARACT, WITH INTRAOCULAR LENS IMPLANT, TORIC LENS  "RIGHT;  Surgeon: Luis Barkley MD;  Location: [facility] OR       Family History:    Family History   Problem Relation Age of Onset    Breast Cancer Maternal Aunt 50         at 85    Breast Cancer Cousin 40    Breast Cancer Mother 49        2nd primary, contralateral breast at 69;  at 86    Melanoma Mother     Breast Cancer Maternal Grandmother 40    Ovarian Cancer Maternal Grandmother     Breast Cancer Paternal Grandmother 50         at 60    Brain Cancer Cousin 20    Breast Cancer Paternal Aunt 50         at 60    Breast Cancer Cousin 45        paternal cousin    Anesthesia Reaction No family hx of     Deep Vein Thrombosis No family hx of        Social History:  Marital Status:   [2]  Social History     Tobacco Use    Smoking status: Former     Types: Cigarettes     Quit date: 2005     Years since quittin.6    Smokeless tobacco: Never   Vaping Use    Vaping Use: Never used   Substance Use Topics    Alcohol use: Not Currently     Alcohol/week: 2.0 standard drinks of alcohol     Types: 1 Glasses of wine, 1 Cans of beer per week    Drug use: No        Medications:    apixaban ANTICOAGULANT (ELIQUIS ANTICOAGULANT) 5 MG tablet  Buprenorphine HCl (BELBUCA) 300 MCG FILM buccal film  busPIRone (BUSPAR) 15 MG tablet  calcium citrate-vitamin D (CITRACAL) 315-250 MG-UNIT TABS  Cholecalciferol (VITAMIN D3 PO)  galcanezumab-gnlm (EMGALITY) 120 MG/ML injection  ketorolac (TORADOL) 15 MG/ML injection  levofloxacin (LEVAQUIN) 500 MG tablet  LORazepam (ATIVAN) 0.5 MG tablet  LORazepam (ATIVAN) 1 MG tablet  metoclopramide (REGLAN) 5 MG tablet  morphine (MSIR) 15 MG IR tablet  Needle, Disp, 27G X 1/2\" MISC  OLANZapine (ZYPREXA) 5 MG tablet  ondansetron (ZOFRAN ODT) 4 MG ODT tab  propranolol ER (INDERAL LA) 80 MG 24 hr capsule  traZODone (DESYREL) 50 MG tablet  ubrogepant (UBRELVY) 100 MG tablet  venlafaxine (EFFEXOR XR) 75 MG 24 hr capsule  vitamin B-2 (RIBOFLAVIN) 25 MG TABS tablet  Zavegepant HCl " "10 MG/ACT SOLN          Review of Systems   All other systems reviewed and are negative.      Physical Exam   BP: (!) 122/97  Pulse: 84  Temp: 97.6  F (36.4  C)  Resp: 20  Height: 157.5 cm (5' 2\")  Weight: 55.8 kg (123 lb)  SpO2: 96 %      Physical Exam  Vitals and nursing note reviewed.   Constitutional:       General: She is in acute distress (In pain, crying).   HENT:      Head: Normocephalic.      Nose: Nose normal.   Eyes:      Extraocular Movements: Extraocular movements intact.      Conjunctiva/sclera: Conjunctivae normal.      Pupils: Pupils are equal, round, and reactive to light.   Neck:      Comments: In c-collar on arrival in triage, left in place  Cardiovascular:      Rate and Rhythm: Normal rate and regular rhythm.   Pulmonary:      Effort: Pulmonary effort is normal.   Abdominal:      Palpations: Abdomen is soft. There is no mass.      Tenderness: There is abdominal tenderness in the periumbilical area. There is no guarding or rebound.   Musculoskeletal:      Thoracic back: Bony tenderness present.   Neurological:      Mental Status: She is alert.         ED Course                 Procedures              Critical Care time:  none       Trauma Summary Disposition     Patient is trauma admission:  Trauma  Evaluation    Spine  Backboard removal time: Backboard not applied   C-collar and immobilization: applied in ED on initial evaluation.  CSpine Clearance: Left in collar, cleared with cervical spine CT  Full Primary and Secondary survey with appropriate immobilization of spine completed in exam section.     Neuro  GCS at arrival:  Motor 6=Obeys commands   Verbal 5=Oriented   Eye Opening 4=Spontaneous   Total: 15     GCS at disposition: unchanged    ED Procedures completed  none           Results for orders placed or performed during the hospital encounter of 07/31/23 (from the past 24 hour(s))   CBC with platelets differential    Narrative    The following orders were created for panel order CBC with " platelets differential.  Procedure                               Abnormality         Status                     ---------                               -----------         ------                     CBC with platelets and d...[[id number]]  Abnormal            Final result                 Please view results for these tests on the individual orders.   Comprehensive metabolic panel   Result Value Ref Range    Sodium 138 136 - 145 mmol/L    Potassium 3.7 3.4 - 5.3 mmol/L    Chloride 102 98 - 107 mmol/L    Carbon Dioxide (CO2) 26 22 - 29 mmol/L    Anion Gap 10 7 - 15 mmol/L    Urea Nitrogen 7.1 (L) 8.0 - 23.0 mg/dL    Creatinine 0.68 0.51 - 0.95 mg/dL    Calcium 8.6 (L) 8.8 - 10.2 mg/dL    Glucose 105 (H) 70 - 99 mg/dL    Alkaline Phosphatase 108 (H) 35 - 104 U/L    AST 25 0 - 45 U/L    ALT 17 0 - 50 U/L    Protein Total 6.0 (L) 6.4 - 8.3 g/dL    Albumin 3.8 3.5 - 5.2 g/dL    Bilirubin Total 0.3 <=1.2 mg/dL    GFR Estimate >90 >60 mL/min/1.73m2   INR   Result Value Ref Range    INR 1.20 (H) 0.85 - 1.15   Partial thromboplastin time   Result Value Ref Range    aPTT 54 (H) 22 - 38 Seconds   Alcohol ethyl   Result Value Ref Range    Alcohol ethyl <0.01 <=0.01 g/dL   ABO/Rh type and screen    Narrative    The following orders were created for panel order ABO/Rh type and screen.  Procedure                               Abnormality         Status                     ---------                               -----------         ------                     Adult Type and Screen[[id number]]                            Final result                 Please view results for these tests on the individual orders.   CBC with platelets and differential   Result Value Ref Range    WBC Count 6.5 4.0 - 11.0 10e3/uL    RBC Count 3.98 3.80 - 5.20 10e6/uL    Hemoglobin 11.7 11.7 - 15.7 g/dL    Hematocrit 35.5 35.0 - 47.0 %    MCV 89 78 - 100 fL    MCH 29.4 26.5 - 33.0 pg    MCHC 33.0 31.5 - 36.5 g/dL    RDW 14.7 10.0 - 15.0 %    Platelet Count 121  (L) 150 - 450 10e3/uL    % Neutrophils 86 %    % Lymphocytes 6 %    % Monocytes 5 %    % Eosinophils 1 %    % Basophils 1 %    % Immature Granulocytes 1 %    NRBCs per 100 WBC 0 <1 /100    Absolute Neutrophils 5.6 1.6 - 8.3 10e3/uL    Absolute Lymphocytes 0.4 (L) 0.8 - 5.3 10e3/uL    Absolute Monocytes 0.3 0.0 - 1.3 10e3/uL    Absolute Eosinophils 0.1 0.0 - 0.7 10e3/uL    Absolute Basophils 0.0 0.0 - 0.2 10e3/uL    Absolute Immature Granulocytes 0.1 <=0.4 10e3/uL    Absolute NRBCs 0.0 10e3/uL   Adult Type and Screen   Result Value Ref Range    ABO/RH(D) O POS     Antibody Screen Negative Negative    SPECIMEN EXPIRATION DATE [id number]    CT Head w/o Contrast    Narrative    CT SCAN OF THE HEAD WITHOUT CONTRAST     CT SCAN OF THE FACE WITHOUT CONTRAST July 31, 2023 11:43 AM     HISTORY: Fall from standing, metastatic cancer to bone/spine, severe  pain, leg numbness, anticoagulated.    TECHNIQUE: Axial images of the head and facial bones were completed  with sagittal and coronal reformations. Radiation dose for this scan  was reduced using automated exposure control, adjustment of the mA  and/or kV according to patient size, or iterative reconstruction  technique.    COMPARISON: Brain MR 7/6/2023. Head CT 3/12/2023.    FINDINGS:   Head CT: No acute intracranial hemorrhage. No mass effect or midline  shift. Periventricular white matter hypodensities most pronounced in  the left frontal lobe which appears similar to prior MR 7/6/2023 given  differences in technique. Ventricular size is within normal limits  without evidence of hydrocephalus. No abnormal extra axial fluid  collection.    No calvarial fracture appreciated. Sclerotic 0.9 cm lesion in the  frontal bone (series 3 image 23) is unchanged since 3/12/2023.    Facial CT: No significant soft tissue swelling. No facial bone  fracture identified. The temporomandibular joints appear properly  located.     Mild mucosal thickening in the left maxillary sinus.      No mass identified within the visualized soft tissues of the neck.       Impression    IMPRESSION:     Head CT:   1. No evidence of acute intracranial hemorrhage, mass, or herniation.  2. Nonspecific white matter changes which may be due to demyelination.  These are better seen on brain MR 7/6/2023.  3. Sclerotic lesion in the frontal bone, similar to prior CT  3/12/2023. Bony metastases are better seen by MRI.    Facial bone CT: No facial bone fracture.      CT Facial Bones without Contrast    Narrative    CT SCAN OF THE HEAD WITHOUT CONTRAST     CT SCAN OF THE FACE WITHOUT CONTRAST July 31, 2023 11:43 AM     HISTORY: Fall from standing, metastatic cancer to bone/spine, severe  pain, leg numbness, anticoagulated.    TECHNIQUE: Axial images of the head and facial bones were completed  with sagittal and coronal reformations. Radiation dose for this scan  was reduced using automated exposure control, adjustment of the mA  and/or kV according to patient size, or iterative reconstruction  technique.    COMPARISON: Brain MR 7/6/2023. Head CT 3/12/2023.    FINDINGS:   Head CT: No acute intracranial hemorrhage. No mass effect or midline  shift. Periventricular white matter hypodensities most pronounced in  the left frontal lobe which appears similar to prior MR 7/6/2023 given  differences in technique. Ventricular size is within normal limits  without evidence of hydrocephalus. No abnormal extra axial fluid  collection.    No calvarial fracture appreciated. Sclerotic 0.9 cm lesion in the  frontal bone (series 3 image 23) is unchanged since 3/12/2023.    Facial CT: No significant soft tissue swelling. No facial bone  fracture identified. The temporomandibular joints appear properly  located.     Mild mucosal thickening in the left maxillary sinus.     No mass identified within the visualized soft tissues of the neck.       Impression    IMPRESSION:     Head CT:   1. No evidence of acute intracranial hemorrhage, mass, or  herniation.  2. Nonspecific white matter changes which may be due to demyelination.  These are better seen on brain MR 7/6/2023.  3. Sclerotic lesion in the frontal bone, similar to prior CT  3/12/2023. Bony metastases are better seen by MRI.    Facial bone CT: No facial bone fracture.      CT Cervical Spine w/o Contrast    Narrative    CT CERVICAL SPINE WITHOUT CONTRAST July 31, 2023 11:45 AM     HISTORY: Fall from standing, metastatic cancer to bone/spine, severe  pain, leg numbness, anticoagulated.     TECHNIQUE: Axial images of the cervical spine were obtained without  intravenous contrast. Multiplanar reformations were performed.  Radiation dose for this scan was reduced using automated exposure  control, adjustment of the mA and/or kV according to patient size, or  iterative reconstruction technique.    COMPARISON: Cervical spine MR 1/31/2020.    FINDINGS: Cervical spine alignment is grossly within normal limits. No  acute lucent fracture line appreciated. No significant loss of  vertebral body height. Mixed lytic and sclerotic lesions in several  vertebral bodies particularly C2, C4, left C6, and C7. Lytic and  sclerotic lesion also seen in the left C5 transverse process. These  are compatible with bony metastases and were also present on MR  1/31/2020 although comparison is difficult given differences in  technique. Moderate degenerative endplate changes and loss of disc  height at C5-C6 and C6-C7. Mild degenerative endplate changes and loss  of disc height at the other levels. No significant facet hypertrophy.  No significant spinal canal narrowing at any level. Moderate right  neural foraminal narrowing at C5-C6.      Impression    IMPRESSION:   1. No evidence of acute fracture or subluxation in the cervical spine.  2. Bony metastatic disease throughout the cervical spine. No definite  pathologic fracture. Bony metastases were present on MR 1/31/2020.  Comparison to prior MR is difficult given differences  in technique.   CT Thoracic Spine w/o Contrast    Narrative    CT THORACIC SPINE WITHOUT CONTRAST     CT LUMBAR SPINE WITHOUT CONTRAST  July 31, 2023 11:47 AM     HISTORY: Fall from standing, metastatic cancer to bone/spine, severe  pain, leg numbness, anticoagulated.     TECHNIQUE: Axial images of the thoracic spine were obtained without  intravenous contrast. Multiplanar reformations were performed.  Radiation dose for this scan was reduced using automated exposure  control, adjustment of the mA and/or kV according to patient size, or  iterative reconstruction technique.    Axial images of the lumbar spine were obtained without intravenous  contrast. Multiplanar reformations were performed.  Radiation dose for  this scan was reduced using automated exposure control, adjustment of  the mA and/or kV according to patient size, or iterative  reconstruction technique.    COMPARISON: PET/CT 3/27/2023. Lumbar spine CT 3/17/2022. Thoracic  spine x-ray 9/7/2022. Thoracic spine MR 4/3/2020.    FINDINGS:    Thoracic Spine: Diffuse mixed lytic and sclerotic lesions throughout  the thoracic spine compatible with bony metastatic disease. No acute  lucent fracture lines appreciated in the thoracic spine. Subtle  deformity of the superior right T11 endplate is unchanged and may  represent a Schmorl's node. No new loss of vertebral body height.    Bony expansion of the T9 posterior elements particularly towards the  right contributes to mild spinal canal narrowing. This was also  present on 3/27/2023. Mild to moderate degenerative endplate changes  and loss of disc height throughout the thoracic spine. Small posterior  disc bulge at T5-T6. No significant spinal canal or neural foraminal  narrowing appreciated on a degenerative basis.    Lumbar Spine: There are five lumbar-type vertebral bodies assumed for  the purposes of this dictation.     Mixed lytic and sclerotic lesions in several lumbar vertebral bodies  particularly the L1  vertebral body. Pathologic burst type fracture of  the L1 vertebral body with anterior wedging and loss of approximately  50% vertebral body height appears unchanged since 3/17/2022. Slight  posterior displacement of the fractured superior L1 endplate towards  the spinal canal causing mild spinal canal narrowing. Horizontal  sclerosis likely representing superior endplate fractures also at L3  and L4 vertebral bodies which are similar to prior CT 3/17/2023.  Posterior displacement of the fractured superior L4 endplate towards  the spinal canal causes mild spinal canal narrowing. No new loss of  vertebral body height since 3/17/2023. Presumed Schmorl's node  deformities of the inferior L2 endplate, also present on prior.    Interval healing with sclerosis across previous fractures at the  bilateral L3 pedicles, these were present on 3/17/2022.    Moderate degenerative endplate changes and loss of disc height at  L5-S1. Mild degenerative endplate changes and loss of disc height at  the other levels. Posterior disc bulge more pronounced towards the  left foraminal region at L3-L4. No significant spinal canal narrowing  on a degenerative basis. Moderate left neural foraminal narrowing at  L3-L4.      Impression    IMPRESSION:    Thoracic spine:  1. Diffuse osseous metastatic disease throughout the thoracic spine.  2. No acute fracture appreciated in the thoracic spine.  3. Bony expansion of the T9 vertebral body from bony metastases  contributes to at least mild spinal canal narrowing. This was also  present on 3/27/2023.  4. Mild degenerative changes throughout the thoracic spine without  significant spinal canal or neural foraminal narrowing appreciated on  a degenerative basis.       Lumbar spine:  1. Diffuse osseous metastatic disease throughout the lumbar spine.  2. No definite new fractures in the lumbar spine.  3. Healing with interval sclerosis across fractures at the L3 pedicles  compared to prior CT  3/17/2022.  4. Additional chronic fractures throughout the lumbar spine with burst  type fracture of L1 and superior endplate fractures of L3 and L4.  These are unchanged.     CT Chest/Abdomen/Pelvis w Contrast    Narrative    CT CHEST/ABDOMEN/PELVIS W CONTRAST 7/31/2023 11:52 AM    CLINICAL HISTORY: Fall from standing, metastatic breast cancer to  bone/spine, severe pain, leg numbness, anticoagulated    TECHNIQUE: CT scan of the chest, abdomen, and pelvis was performed  following injection of IV contrast. Multiplanar reformats were  obtained. Dose reduction techniques were used.   CONTRAST: ISOVUE-370, 61 mL    COMPARISON: CT exams 3/27/2023 and 3/31/2022    FINDINGS:   LUNGS AND PLEURA: The central airways are clear. Mild parenchymal  scarring. No pneumothorax, pulmonary contusion, pleural fluid or mass.  Newly visualized 5 mm left upper lobe subpleural nodular opacity image  63 series 5.    MEDIASTINUM/AXILLAE: Left-sided port catheter. Bilateral intact breast  implants. Right axillary surgical clips. No thoracic adenopathy.  Calcified mediastinal lymph nodes. Normal heart size and no  pericardial effusion. Unremarkable major vessels. No mediastinal  hematoma.    HEPATOBILIARY: Cholecystectomy with stable biliary ductal dilatation.  No obstructing mass lesion or radiodense stone. No focal liver lesion.    PANCREAS: Normal.    SPLEEN: Normal.    ADRENAL GLANDS: Normal.    KIDNEYS/BLADDER: Small renal cortical cysts. No follow-up is  indicated. Symmetric heterogeneous appearance of the medial upper pole  renal cortices likely due to timing of the contrast bolus. No  hydronephrosis.    BOWEL: Normal caliber small bowel without focal mural thickening.  Normal appendix. Mild colonic stool burden. No free air or free fluid.    LYMPH NODES: Normal.    VASCULATURE: Unremarkable.    PELVIC ORGANS: Retroflexed uterus. No pelvic free fluid.    MUSCULOSKELETAL: Diffuse lytic and sclerotic osseous lesions are  stable.  Multiple bilateral healed rib fractures. Stable L1 and L4  vertebral body compression fractures. No acute osseous injury.      Impression    IMPRESSION:  1.  No acute posttraumatic findings in the chest, abdomen or pelvis.  2.  No evidence of acute osseous injury. Stable old healed bilateral  rib fractures and stable L1 and L4 vertebral body compression  fractures.  3.  Stable widespread osseous metastases.  4.  Newly visualized 5 mm left upper lobe nodular opacity. Recommend  attention on follow-up.    SELINA BELTRAN MD         SYSTEM ID:  [id number]   CT Lumbar Spine w/o Contrast    Narrative    CT THORACIC SPINE WITHOUT CONTRAST     CT LUMBAR SPINE WITHOUT CONTRAST  July 31, 2023 11:47 AM     HISTORY: Fall from standing, metastatic cancer to bone/spine, severe  pain, leg numbness, anticoagulated.     TECHNIQUE: Axial images of the thoracic spine were obtained without  intravenous contrast. Multiplanar reformations were performed.  Radiation dose for this scan was reduced using automated exposure  control, adjustment of the mA and/or kV according to patient size, or  iterative reconstruction technique.    Axial images of the lumbar spine were obtained without intravenous  contrast. Multiplanar reformations were performed.  Radiation dose for  this scan was reduced using automated exposure control, adjustment of  the mA and/or kV according to patient size, or iterative  reconstruction technique.    COMPARISON: PET/CT 3/27/2023. Lumbar spine CT 3/17/2022. Thoracic  spine x-ray 9/7/2022. Thoracic spine MR 4/3/2020.    FINDINGS:    Thoracic Spine: Diffuse mixed lytic and sclerotic lesions throughout  the thoracic spine compatible with bony metastatic disease. No acute  lucent fracture lines appreciated in the thoracic spine. Subtle  deformity of the superior right T11 endplate is unchanged and may  represent a Schmorl's node. No new loss of vertebral body height.    Bony expansion of the T9 posterior elements  particularly towards the  right contributes to mild spinal canal narrowing. This was also  present on 3/27/2023. Mild to moderate degenerative endplate changes  and loss of disc height throughout the thoracic spine. Small posterior  disc bulge at T5-T6. No significant spinal canal or neural foraminal  narrowing appreciated on a degenerative basis.    Lumbar Spine: There are five lumbar-type vertebral bodies assumed for  the purposes of this dictation.     Mixed lytic and sclerotic lesions in several lumbar vertebral bodies  particularly the L1 vertebral body. Pathologic burst type fracture of  the L1 vertebral body with anterior wedging and loss of approximately  50% vertebral body height appears unchanged since 3/17/2022. Slight  posterior displacement of the fractured superior L1 endplate towards  the spinal canal causing mild spinal canal narrowing. Horizontal  sclerosis likely representing superior endplate fractures also at L3  and L4 vertebral bodies which are similar to prior CT 3/17/2023.  Posterior displacement of the fractured superior L4 endplate towards  the spinal canal causes mild spinal canal narrowing. No new loss of  vertebral body height since 3/17/2023. Presumed Schmorl's node  deformities of the inferior L2 endplate, also present on prior.    Interval healing with sclerosis across previous fractures at the  bilateral L3 pedicles, these were present on 3/17/2022.    Moderate degenerative endplate changes and loss of disc height at  L5-S1. Mild degenerative endplate changes and loss of disc height at  the other levels. Posterior disc bulge more pronounced towards the  left foraminal region at L3-L4. No significant spinal canal narrowing  on a degenerative basis. Moderate left neural foraminal narrowing at  L3-L4.      Impression    IMPRESSION:    Thoracic spine:  1. Diffuse osseous metastatic disease throughout the thoracic spine.  2. No acute fracture appreciated in the thoracic spine.  3. Bony  expansion of the T9 vertebral body from bony metastases  contributes to at least mild spinal canal narrowing. This was also  present on 3/27/2023.  4. Mild degenerative changes throughout the thoracic spine without  significant spinal canal or neural foraminal narrowing appreciated on  a degenerative basis.       Lumbar spine:  1. Diffuse osseous metastatic disease throughout the lumbar spine.  2. No definite new fractures in the lumbar spine.  3. Healing with interval sclerosis across fractures at the L3 pedicles  compared to prior CT 3/17/2022.  4. Additional chronic fractures throughout the lumbar spine with burst  type fracture of L1 and superior endplate fractures of L3 and L4.  These are unchanged.       *Note: Due to a large number of results and/or encounters for the requested time period, some results have not been displayed. A complete set of results can be found in Results Review.       Medications   lidocaine 1 % 0.1-1 mL (has no administration in time range)   lidocaine (LMX4) kit (has no administration in time range)   sodium chloride (PF) 0.9% PF flush 3 mL (has no administration in time range)   sodium chloride (PF) 0.9% PF flush 3 mL (has no administration in time range)   HYDROmorphone (PF) (DILAUDID) injection 0.5 mg (0.5 mg Intravenous $Given 7/31/23 1149)   ondansetron (ZOFRAN) injection 4 mg (4 mg Intravenous $Given 7/31/23 1052)   iopamidol (ISOVUE-370) solution 500 mL (61 mLs Intravenous $Given 7/31/23 1120)   sodium chloride 0.9 % bag 100mL for CT scan flush use (60 mLs Intravenous $Given 7/31/23 1120)       Assessments & Plan (with Medical Decision Making)  Shaneka is a 63-year-old female with past medical history of breast cancer that has metastasized to her bone and spine.  Presents to the emergency room today with her  over concern of fall yesterday and severe pain.  See history and focused physical exam as above  Trauma eval was called from triage.  Patient was placed in a c-collar  on arrival due to reported fall and neck pain.  She was able to stand from the wheelchair, but needed her  to help lift her up and hold her to get her to pivot to the bed.  Also has some abdominal tenderness.  Said that she hit her head yesterday, but did not notice sign of skull fracture or head trauma.  Do not notice any facial deformity or crepitus to suggest underlying fracture.  However, since she did hit her face, we will get a CT facial bone scan along with trauma pan scan.  She will remain in the collar for now.  We will also give pain medication to help with her acute symptoms  Imaging as above.  No acute traumatic injury is appreciated.  She does have numerous lytic lesions and chronic findings, appear to be unchanged from previous scans.  C-collar was removed.  She was given a dose of oral IR morphine 15 mg.  After allowing the patient to have better pain control, had RN attempted ambulate with patient to the bathroom.  She was able to urinate, but on the walk back to the room said that her legs started to feel numb and that she could not hold up her body weight anymore.  She was having numbness in her anterior thighs.  Denies saddle anesthesia when questioned.  Since she is unable to complete ADLs and it would be unsafe for her to go home, spoke with Dr. Tariq Crawford who excepted the patient for observation status.  We will get pain control and reassess disposition tomorrow.  MRI of her lumbar spine has been ordered due to the new anterior thigh numbness and this is pending.     I have reviewed the nursing notes.    I have reviewed the findings, diagnosis, plan and need for follow up with the patient.       ED to Inpatient Handoff:    Discussed with Dr. Tariq Crawford at 1515  Patient accepted for Observation Stay  Pending studies include MRI lumbar spine  Code Status: Not Addressed             Medical Decision Making  The patient's presentation was of high complexity (a chronic illness severe  exacerbation, progression, or side effect of treatment).    The patient's evaluation involved:  ordering and/or review of 3+ test(s) in this encounter (see separate area of note for details)    The patient's management necessitated high risk (a decision regarding hospitalization).        New Prescriptions    No medications on file       Final diagnoses:   Fall, initial encounter   Intractable pain   Metastatic breast cancer   Cancer associated pain   Bony metastasis       7/31/2023   [facility] EMERGENCY DEPT       Arlette Keller,   07/31/23 1528

## 2023-08-01 NOTE — PROGRESS NOTES
"   08/01/23 1000   Appointment Info   Signing Clinician's Name / Credentials (OT) Maggie Quintanilla, OTR/L   Living Environment   People in Home spouse   Current Living Arrangements house   Home Accessibility no concerns   Living Environment Comments Main level living, has FWW but does not use, Step over tub   Self-Care   Usual Activity Tolerance good   Current Activity Tolerance moderate   Equipment Currently Used at Home cane, quad;cane, straight;hospital bed;walker, rolling   Fall history within last six months yes   Number of times patient has fallen within last six months 3   Activity/Exercise/Self-Care Comment Independent ADLs at baseline   Instrumental Activities of Daily Living (IADL)   Previous Responsibilities meal prep;housekeeping;laundry;shopping;medication management;finances;driving   IADL Comments Shares household duties with spouse, ind with meds, finances   General Information   Onset of Illness/Injury or Date of Surgery 07/31/23   Referring Physician Eloy Tomlinson   Patient/Family Therapy Goal Statement (OT) To return home   Additional Occupational Profile Info/Pertinent History of Current Problem Per provider's recent progress note, \"Shaneak Alvarez is a 63 year old female who presents on 7/31/2023 with complaint of back and neck pain after a fall last evening. Pain is worsening of chronic pain.\" PMHX includes Uncontrolled pain  Fall  Breast Cancer with bone metastases effecting spine, History of PE,      Migraine,   Multiple Sclerosis  MRI Brain 7/6/23 no significant change in demyelinating disease   General Observations and Info OT orders for Discharge recommendations   Cognitive Status Examination   Orientation Status orientation to person, place and time   Cognitive Status Comments No deficits identified, appropriate engagement   Visual Perception   Visual Impairment/Limitations corrective lenses full-time   Sensory   Sensory Comments Baseline   Pain Assessment   Patient Currently in Pain No   Range " of Motion Comprehensive   Comment, General Range of Motion WFL   Strength Comprehensive (MMT)   Comment, General Manual Muscle Testing (MMT) Assessment Generalized weakness, improving   Coordination   Coordination Comments No deficit identified   Bed Mobility   Comment (Bed Mobility) Mod Ind   Transfers   Transfer Comments SBA for safety, A with IV pole   Balance   Balance Comments Surface seeking with ambulation, PT to assess this afternoon   Activities of Daily Living   BADL Assessment/Intervention lower body dressing;grooming;toileting   Lower Body Dressing Assessment/Training   Comment, (Lower Body Dressing) Ind, seated   Grooming Assessment/Training   Comment, (Grooming) Ind, SBA with standing sink side hygiene for safety   Toileting   Comment, (Toileting) Ind, SBA with transfer   Clinical Impression   Criteria for Skilled Therapeutic Interventions Met (OT) Yes, treatment indicated   OT Diagnosis Impaired sustained daily participation   Influenced by the following impairments Fatigue   OT Problem List-Impairments impacting ADL problems related to;activity tolerance impaired   Assessment of Occupational Performance 1-3 Performance Deficits   Planned Therapy Interventions (OT) ADL retraining   Clinical Decision Making Complexity (OT) low complexity   Anticipated Equipment Needs Upon Discharge (OT) shower chair   Risk & Benefits of therapy have been explained evaluation/treatment results reviewed;care plan/treatment goals reviewed;risks/benefits reviewed;participants voiced agreement with care plan;current/potential barriers reviewed;participants included;patient   Clinical Impression Comments Patient presents with general fatigue, history of recent falls impacting sustained participation in self cares. She will benefit from skilled OT for energy conservation education.   OT Total Evaluation Time   OT Eval, Low Complexity Minutes (90608) 10   OT Goals   Therapy Frequency (OT) One time eval and treatment   OT  Predicted Duration/Target Date for Goal Attainment 08/01/23   OT Goals OT Goal 1   OT: Goal 1 Patient will verbalize understanding of energy conservation strategies to support safety and independence with self care routine. Goal met   Interventions   Interventions Quick Adds Therapeutic Activity   Therapeutic Activities   Therapeutic Activity Minutes (69626) 20   Symptoms noted during/after treatment fatigue   Treatment Detail/Skilled Intervention SBA for room mobility with assist for IV pole, no LOB but patient observed to be surface seeking - reports she does not use FWW at home. SBA for toileting, standing sink side hygiene and grooming, ind seated LB dressing. Educated patient on pacing, planning, and prioritizing and banking/budgeting energy for fatigue management. Discussed option of outpatient OT for further education. Patient declines OP OT, but verbalizes understanding of learning. Return to recliner following ~9:00 continuous participation with ADL tasks. Education on shower chair/tub bench for fall prevention and fatigue management.  Nursing reports on walking 60' with patient prior to OT session. Chair alarm activated, call light left within reach   OT Discharge Planning   OT Plan No further IP OT needs   OT Discharge Recommendation (DC Rec) home;home with assist   OT Rationale for DC Rec Here from home with spouse, ind at baseline with ADL/IADLs. Currently at basline ADL performance, limited but fatigue/general weakness secondary to cancer treatment. Receptive to energy conservation education, declines outpatient OT for further education. Denies questions, concerns, barriers to returning home at this time.  is off work and able to assist. Surface seeking noted with ambulation, no LOB. PT to eval later today.   OT Brief overview of current status SBA for mobility, surface seeking but no LOB. Ind ADLs, EC education   Total Session Time   Timed Code Treatment Minutes 20   Total Session Time (sum of  timed and untimed services) 30   Thank you for your referral.  SARAH Caruso/BRENNA     [facility]  O: [phone]  E: [email or web address]

## 2023-08-01 NOTE — CONSULTS
Care Management Initial Consult    General Information  Assessment completed with: Patient,    Type of CM/SW Visit: Initial Assessment    Primary Care Provider verified and updated as needed: Yes (Pt reports she doesn't have a PCP, but sees her oncologist weekly)   Readmission within the last 30 days: no previous admission in last 30 days      Reason for Consult: other (see comments) (Elevated risk for hospital readmission)  Advance Care Planning:    Has a POLST       Communication Assessment  Patient's communication style: spoken language (English or Bilingual)    Hearing Difficulty or Deaf: no   Wear Glasses or Blind: yes    Cognitive  Cognitive/Neuro/Behavioral: WDL                      Living Environment:   People in home: spouse  Kash  Current living Arrangements: house      Able to return to prior arrangements: yes       Family/Social Support:  Care provided by: self, spouse/significant other  Provides care for: pet(s)  Marital Status:     Kash       Description of Support System: Supportive, Involved    Support Assessment: Adequate family and caregiver support, Adequate social supports    Current Resources:   Patient receiving home care services: No     Community Resources: None  Equipment currently used at home: cane, quad, cane, straight, hospital bed, walker, rolling  Supplies currently used at home: None    Employment/Financial:  Employment Status: retired        Financial Concerns: No concerns identified           Does the patient's insurance plan have a 3 day qualifying hospital stay waiver?  No    Lifestyle & Psychosocial Needs:  Social Determinants of Health     Tobacco Use: Medium Risk (7/18/2023)    Patient History     Smoking Tobacco Use: Former     Smokeless Tobacco Use: Never     Passive Exposure: Not on file   Alcohol Use: Not on file   Financial Resource Strain: Not on file   Food Insecurity: Not on file   Transportation Needs: Not on file   Physical Activity: Not on file   Stress:  Not on file   Social Connections: Not on file   Intimate Partner Violence: Not on file   Depression: Not at risk (5/24/2023)    PHQ-2     PHQ-2 Score: 0   Housing Stability: Not on file       Functional Status:  Prior to admission patient needed assistance:   Dependent ADLs:: Independent  Dependent IADLs:: Independent  Assesssment of Functional Status: At functional baseline    Mental Health Status:  Mental Health Status: No Current Concerns       Chemical Dependency Status:  Chemical Dependency Status: No Current Concerns             Values/Beliefs:  Spiritual, Cultural Beliefs, [employer] Practices, Values that affect care: no               Additional Information:  Care Management has been consulted due to elevated risk for hospital readmission.      Writer visited with patient and reviewed the information above.  Patient lives with her  and two dogs in their home in [place].      Patient stated she does well at home and is independent with ADLs and IADLs.  Patient has a cane, walker, and power scooter available to her at home, but she normally does not need any of the equipment.  Patient denies having any discharge needs.  Patient reported that she does not have a designated PCP and really just sees whoever is available at the [facility].  Patient stated she sees her oncologist (Dr. Caputo) almost weekly and is followed by palliative care (Dr. Gomes).      Discussed elevated risk for readmission.  Patient declines having an appointment scheduled at the [facility] for follow up.  Patient will see her oncologist.  Patient in agreement with handoff to clinic care coordination.  Patient denies having any discharge needs at this time.      Care Management will continue to follow.    NOMI Tillman  [facility]   [phone]

## 2023-08-01 NOTE — PROGRESS NOTES
"S-(situation): Patient discharged to home via private vehicle with     B-(background): Observation goals met     A-(assessment): Pt is independently walking in her room and to the bathroom. PRN Dilaudid given due to 5/10 pain in her lumbar area. Port  a cath heparin flushed and de-access. Discharge instructions given and all questions were answered. Stored medication, Belbuca, was returned to patient.    /80 (BP Location: Left arm)   Pulse 61   Temp 98.1  F (36.7  C) (Oral)   Resp 20   Ht 1.575 m (5' 2\")   Wt 58.1 kg (128 lb 1.4 oz)   SpO2 99%   BMI 23.43 kg/m        R-(recommendations): Discharge instructions reviewed with patient. Listed belongings gathered and returned to patient.yes  Patient Education resolved: Yes  New medications-Pt. Has been educated about reason of use and side effects Yes  Home medications returned to patient Yes  Medication Bin checked and emptied on discharge Yes      "

## 2023-08-01 NOTE — PROGRESS NOTES
"PRIMARY DIAGNOSIS: BONE METASTASES  OUTPATIENT/OBSERVATION GOALS TO BE MET BEFORE DISCHARGE:  1. Pain Status: Improved-controlled with oral pain medications.    2. Return to near baseline physical activity: Yes    3. Cleared for discharge by consultants (if involved): No    Pt consumed 75% breakfast. 4/10 pain and scheduled Tylenol given.    /66 (BP Location: Left arm)   Pulse 54   Temp 98.1  F (36.7  C) (Oral)   Resp 18   Ht 1.575 m (5' 2\")   Wt 58.1 kg (128 lb 1.4 oz)   SpO2 96%   BMI 23.43 kg/m    .vs  "

## 2023-08-01 NOTE — PROGRESS NOTES
[facility]    Medicine Progress Note - Hospitalist Service    Date of Admission:  7/31/2023    Assessment & Plan   Shaneka Alvarez is a 63 year old female who presents on 7/31/2023 with complaint of back and neck pain after a fall last evening. Pain is worsening of chronic pain.    Uncontrolled pain  Fall  Breast Cancer with bone metastases effecting spine  Multiple chronic spinal fractures, though no new fractures per imaging  Follows with Palliative Care for pain control  Needed IV dilaudid x 3 since midnight with last dose at 0544.  She reports feeling pretty good this morning rating pain 4/10.  Described some spasm type pain overnight in lower back.  Normal acceptable pain level is 3-4/10  Plan:  Continue home pain regimen with buprenorphine BID, Morphine 15-30mg Q3H  Flexeril PRN for muscle spasms  Encourage oral morphine over IV dilaudid  PT and OT evaluate and treat  Considering adding steroid burst and taper if pain continues or worsens  Considering discharging later today if pain is controlled using home pain regimen    History of PE  Continue Eliquis    Migraine  Continue home medications: Ubrelvy, Reglan and diphenhydramine   Continue Propranolol, Buspar    Multiple Sclerosis  MRI Brain 7/6/23 no significant change in demyelinating disease      Principal Problem:    Bone metastases  Active Problems:    Metastatic breast cancer    Cancer associated pain    Intractable pain    Fall, initial encounter       Diet: Regular Diet Adult    DVT Prophylaxis: DOAC  Huntley Catheter: Not present  Lines: PRESENT      Port A Cath Single Left Chest wall-Site Assessment: WDL      Cardiac Monitoring: None  Code Status: Full Code      Clinically Significant Risk Factors Present on Admission          # Hypocalcemia: Lowest Ca = 8.3 mg/dL in last 2 days, will monitor and replace as appropriate      # Drug Induced Coagulation Defect: home medication list includes an anticoagulant medication  #  Thrombocytopenia: Lowest platelets = 110 in last 2 days, will monitor for bleeding                 Disposition Plan      Expected Discharge Date: 08/01/2023                The patient's care was discussed with the Patient.    Elsa Rodgers CNP  Hospitalist Service  [facility]  Securely message with [employer] (more info)  Text page via [facility] Paging/Directory   ______________________________________________________________________    Interval History   Had significant lower back pain during the night, taking dilaudid IV 3 times since midnight.  She reports feeling much more comfortable this morning.  She was able to eat breakfast.  She had some nausea overnight, which she reports is not unusual.      Physical Exam   Vital Signs: Temp: 98.1  F (36.7  C) Temp src: Oral BP: 100/66 Pulse: 54   Resp: 18 SpO2: 96 % O2 Device: None (Room air)    Weight: 128 lbs 1.4 oz    General Appearance: Sitting up in bed unsupported.  Alert and oriented, no acute distress.   Respiratory: resp effort easy, lung sounds clear  Cardiovascular: RRR, no LE edema  GI: abdomen is soft and nontender bowel sounds present  Skin: scabbed over scraps on bilateral legs  Other: Moving freely in bed.       Medical Decision Making       40 MINUTES SPENT BY ME on the date of service doing chart review, history, exam, documentation & further activities per the note.      Data     I have personally reviewed the following data over the past 24 hrs:    7.6  \   11.1 (L)   / 110 (L)     140 106 5.5 (L) /  109 (H)   4.2 27 0.59 \     ALT: 14 AST: 22 AP: 99 TBILI: <0.2   ALB: 3.6 TOT PROTEIN: 5.7 (L) LIPASE: N/A     INR:  1.20 (H) PTT:  54 (H)   D-dimer:  N/A Fibrinogen:  N/A       Imaging results reviewed over the past 24 hrs:   Recent Results (from the past 24 hour(s))   CT Head w/o Contrast    Narrative    CT SCAN OF THE HEAD WITHOUT CONTRAST     CT SCAN OF THE FACE WITHOUT CONTRAST July 31, 2023 11:43 AM     HISTORY: Fall from  standing, metastatic cancer to bone/spine, severe  pain, leg numbness, anticoagulated.    TECHNIQUE: Axial images of the head and facial bones were completed  with sagittal and coronal reformations. Radiation dose for this scan  was reduced using automated exposure control, adjustment of the mA  and/or kV according to patient size, or iterative reconstruction  technique.    COMPARISON: Brain MR 7/6/2023. Head CT 3/12/2023.    FINDINGS:   Head CT: No acute intracranial hemorrhage. No mass effect or midline  shift. Periventricular white matter hypodensities most pronounced in  the left frontal lobe which appears similar to prior MR 7/6/2023 given  differences in technique. Ventricular size is within normal limits  without evidence of hydrocephalus. No abnormal extra axial fluid  collection.    No calvarial fracture appreciated. Sclerotic 0.9 cm lesion in the  frontal bone (series 3 image 23) is unchanged since 3/12/2023.    Facial CT: No significant soft tissue swelling. No facial bone  fracture identified. The temporomandibular joints appear properly  located.     Mild mucosal thickening in the left maxillary sinus.     No mass identified within the visualized soft tissues of the neck.       Impression    IMPRESSION:     Head CT:   1. No evidence of acute intracranial hemorrhage, mass, or herniation.  2. Nonspecific white matter changes which may be due to demyelination.  These are better seen on brain MR 7/6/2023.  3. Sclerotic lesion in the frontal bone, similar to prior CT  3/12/2023. Bony metastases are better seen by MRI.    Facial bone CT: No facial bone fracture.       YESSI GARCIA MD         SYSTEM ID:  [id number]   CT Facial Bones without Contrast    Narrative    CT SCAN OF THE HEAD WITHOUT CONTRAST     CT SCAN OF THE FACE WITHOUT CONTRAST July 31, 2023 11:43 AM     HISTORY: Fall from standing, metastatic cancer to bone/spine, severe  pain, leg numbness, anticoagulated.    TECHNIQUE: Axial images of the head  and facial bones were completed  with sagittal and coronal reformations. Radiation dose for this scan  was reduced using automated exposure control, adjustment of the mA  and/or kV according to patient size, or iterative reconstruction  technique.    COMPARISON: Brain MR 7/6/2023. Head CT 3/12/2023.    FINDINGS:   Head CT: No acute intracranial hemorrhage. No mass effect or midline  shift. Periventricular white matter hypodensities most pronounced in  the left frontal lobe which appears similar to prior MR 7/6/2023 given  differences in technique. Ventricular size is within normal limits  without evidence of hydrocephalus. No abnormal extra axial fluid  collection.    No calvarial fracture appreciated. Sclerotic 0.9 cm lesion in the  frontal bone (series 3 image 23) is unchanged since 3/12/2023.    Facial CT: No significant soft tissue swelling. No facial bone  fracture identified. The temporomandibular joints appear properly  located.     Mild mucosal thickening in the left maxillary sinus.     No mass identified within the visualized soft tissues of the neck.       Impression    IMPRESSION:     Head CT:   1. No evidence of acute intracranial hemorrhage, mass, or herniation.  2. Nonspecific white matter changes which may be due to demyelination.  These are better seen on brain MR 7/6/2023.  3. Sclerotic lesion in the frontal bone, similar to prior CT  3/12/2023. Bony metastases are better seen by MRI.    Facial bone CT: No facial bone fracture.       YESSI GARCIA MD         SYSTEM ID:  [id number]   CT Cervical Spine w/o Contrast    Narrative    CT CERVICAL SPINE WITHOUT CONTRAST July 31, 2023 11:45 AM     HISTORY: Fall from standing, metastatic cancer to bone/spine, severe  pain, leg numbness, anticoagulated.     TECHNIQUE: Axial images of the cervical spine were obtained without  intravenous contrast. Multiplanar reformations were performed.  Radiation dose for this scan was reduced using automated  exposure  control, adjustment of the mA and/or kV according to patient size, or  iterative reconstruction technique.    COMPARISON: Cervical spine MR 1/31/2020.    FINDINGS: Cervical spine alignment is grossly within normal limits. No  acute lucent fracture line appreciated. No significant loss of  vertebral body height. Mixed lytic and sclerotic lesions in several  vertebral bodies particularly C2, C4, left C6, and C7. Lytic and  sclerotic lesion also seen in the left C5 transverse process. These  are compatible with bony metastases and were also present on MR  1/31/2020 although comparison is difficult given differences in  technique. Moderate degenerative endplate changes and loss of disc  height at C5-C6 and C6-C7. Mild degenerative endplate changes and loss  of disc height at the other levels. No significant facet hypertrophy.  No significant spinal canal narrowing at any level. Moderate right  neural foraminal narrowing at C5-C6.      Impression    IMPRESSION:   1. No evidence of acute fracture or subluxation in the cervical spine.  2. Bony metastatic disease throughout the cervical spine. No definite  pathologic fracture. Bony metastases were present on MR 1/31/2020.  Comparison to prior MR is difficult given differences in technique.    YESSI GARCIA MD         SYSTEM ID:  [id number]   CT Thoracic Spine w/o Contrast    Narrative    CT THORACIC SPINE WITHOUT CONTRAST     CT LUMBAR SPINE WITHOUT CONTRAST  July 31, 2023 11:47 AM     HISTORY: Fall from standing, metastatic cancer to bone/spine, severe  pain, leg numbness, anticoagulated.     TECHNIQUE: Axial images of the thoracic spine were obtained without  intravenous contrast. Multiplanar reformations were performed.  Radiation dose for this scan was reduced using automated exposure  control, adjustment of the mA and/or kV according to patient size, or  iterative reconstruction technique.    Axial images of the lumbar spine were obtained without  intravenous  contrast. Multiplanar reformations were performed.  Radiation dose for  this scan was reduced using automated exposure control, adjustment of  the mA and/or kV according to patient size, or iterative  reconstruction technique.    COMPARISON: PET/CT 3/27/2023. Lumbar spine CT 3/17/2022. Thoracic  spine x-ray 9/7/2022. Thoracic spine MR 4/3/2020.    FINDINGS:    Thoracic Spine: Diffuse mixed lytic and sclerotic lesions throughout  the thoracic spine compatible with bony metastatic disease. No acute  lucent fracture lines appreciated in the thoracic spine. Subtle  deformity of the superior right T11 endplate is unchanged and may  represent a Schmorl's node. No new loss of vertebral body height.    Bony expansion of the T9 posterior elements particularly towards the  right contributes to mild spinal canal narrowing. This was also  present on 3/27/2023. Mild to moderate degenerative endplate changes  and loss of disc height throughout the thoracic spine. Small posterior  disc bulge at T5-T6. No significant spinal canal or neural foraminal  narrowing appreciated on a degenerative basis.    Lumbar Spine: There are five lumbar-type vertebral bodies assumed for  the purposes of this dictation.     Mixed lytic and sclerotic lesions in several lumbar vertebral bodies  particularly the L1 vertebral body. Pathologic burst type fracture of  the L1 vertebral body with anterior wedging and loss of approximately  50% vertebral body height appears unchanged since 3/17/2022. Slight  posterior displacement of the fractured superior L1 endplate towards  the spinal canal causing mild spinal canal narrowing. Horizontal  sclerosis likely representing superior endplate fractures also at L3  and L4 vertebral bodies which are similar to prior CT 3/17/2023.  Posterior displacement of the fractured superior L4 endplate towards  the spinal canal causes mild spinal canal narrowing. No new loss of  vertebral body height since  3/17/2023. Presumed Schmorl's node  deformities of the inferior L2 endplate, also present on prior.    Interval healing with sclerosis across previous fractures at the  bilateral L3 pedicles, these were present on 3/17/2022.    Moderate degenerative endplate changes and loss of disc height at  L5-S1. Mild degenerative endplate changes and loss of disc height at  the other levels. Posterior disc bulge more pronounced towards the  left foraminal region at L3-L4. No significant spinal canal narrowing  on a degenerative basis. Moderate left neural foraminal narrowing at  L3-L4.      Impression    IMPRESSION:    Thoracic spine:  1. Diffuse osseous metastatic disease throughout the thoracic spine.  2. No acute fracture appreciated in the thoracic spine.  3. Bony expansion of the T9 vertebral body from bony metastases  contributes to at least mild spinal canal narrowing. This was also  present on 3/27/2023.  4. Mild degenerative changes throughout the thoracic spine without  significant spinal canal or neural foraminal narrowing appreciated on  a degenerative basis.       Lumbar spine:  1. Diffuse osseous metastatic disease throughout the lumbar spine.  2. No definite new fractures in the lumbar spine.  3. Healing with interval sclerosis across fractures at the L3 pedicles  compared to prior CT 3/17/2022.  4. Additional chronic fractures throughout the lumbar spine with burst  type fracture of L1 and superior endplate fractures of L3 and L4.  These are unchanged.    YESSI GARCIA MD         SYSTEM ID:  [id number]   CT Chest/Abdomen/Pelvis w Contrast    Narrative    CT CHEST/ABDOMEN/PELVIS W CONTRAST 7/31/2023 11:52 AM    CLINICAL HISTORY: Fall from standing, metastatic breast cancer to  bone/spine, severe pain, leg numbness, anticoagulated    TECHNIQUE: CT scan of the chest, abdomen, and pelvis was performed  following injection of IV contrast. Multiplanar reformats were  obtained. Dose reduction techniques were used.    CONTRAST: [id number], 61 mL    COMPARISON: CT exams 3/27/2023 and 3/31/2022    FINDINGS:   LUNGS AND PLEURA: The central airways are clear. Mild parenchymal  scarring. No pneumothorax, pulmonary contusion, pleural fluid or mass.  Newly visualized 5 mm left upper lobe subpleural nodular opacity image  63 series 5.    MEDIASTINUM/AXILLAE: Left-sided port catheter. Bilateral intact breast  implants. Right axillary surgical clips. No thoracic adenopathy.  Calcified mediastinal lymph nodes. Normal heart size and no  pericardial effusion. Unremarkable major vessels. No mediastinal  hematoma.    HEPATOBILIARY: Cholecystectomy with stable biliary ductal dilatation.  No obstructing mass lesion or radiodense stone. No focal liver lesion.    PANCREAS: Normal.    SPLEEN: Normal.    ADRENAL GLANDS: Normal.    KIDNEYS/BLADDER: Small renal cortical cysts. No follow-up is  indicated. Symmetric heterogeneous appearance of the medial upper pole  renal cortices likely due to timing of the contrast bolus. No  hydronephrosis.    BOWEL: Normal caliber small bowel without focal mural thickening.  Normal appendix. Mild colonic stool burden. No free air or free fluid.    LYMPH NODES: Normal.    VASCULATURE: Unremarkable.    PELVIC ORGANS: Retroflexed uterus. No pelvic free fluid.    MUSCULOSKELETAL: Diffuse lytic and sclerotic osseous lesions are  stable. Multiple bilateral healed rib fractures. Stable L1 and L4  vertebral body compression fractures. No acute osseous injury.      Impression    IMPRESSION:  1.  No acute posttraumatic findings in the chest, abdomen or pelvis.  2.  No evidence of acute osseous injury. Stable old healed bilateral  rib fractures and stable L1 and L4 vertebral body compression  fractures.  3.  Stable widespread osseous metastases.  4.  Newly visualized 5 mm left upper lobe nodular opacity. Recommend  attention on follow-up.    SELINA BELTRAN MD         SYSTEM ID:  [id number]   CT Lumbar Spine w/o Contrast     Narrative    CT THORACIC SPINE WITHOUT CONTRAST     CT LUMBAR SPINE WITHOUT CONTRAST  July 31, 2023 11:47 AM     HISTORY: Fall from standing, metastatic cancer to bone/spine, severe  pain, leg numbness, anticoagulated.     TECHNIQUE: Axial images of the thoracic spine were obtained without  intravenous contrast. Multiplanar reformations were performed.  Radiation dose for this scan was reduced using automated exposure  control, adjustment of the mA and/or kV according to patient size, or  iterative reconstruction technique.    Axial images of the lumbar spine were obtained without intravenous  contrast. Multiplanar reformations were performed.  Radiation dose for  this scan was reduced using automated exposure control, adjustment of  the mA and/or kV according to patient size, or iterative  reconstruction technique.    COMPARISON: PET/CT 3/27/2023. Lumbar spine CT 3/17/2022. Thoracic  spine x-ray 9/7/2022. Thoracic spine MR 4/3/2020.    FINDINGS:    Thoracic Spine: Diffuse mixed lytic and sclerotic lesions throughout  the thoracic spine compatible with bony metastatic disease. No acute  lucent fracture lines appreciated in the thoracic spine. Subtle  deformity of the superior right T11 endplate is unchanged and may  represent a Schmorl's node. No new loss of vertebral body height.    Bony expansion of the T9 posterior elements particularly towards the  right contributes to mild spinal canal narrowing. This was also  present on 3/27/2023. Mild to moderate degenerative endplate changes  and loss of disc height throughout the thoracic spine. Small posterior  disc bulge at T5-T6. No significant spinal canal or neural foraminal  narrowing appreciated on a degenerative basis.    Lumbar Spine: There are five lumbar-type vertebral bodies assumed for  the purposes of this dictation.     Mixed lytic and sclerotic lesions in several lumbar vertebral bodies  particularly the L1 vertebral body. Pathologic burst type fracture  of  the L1 vertebral body with anterior wedging and loss of approximately  50% vertebral body height appears unchanged since 3/17/2022. Slight  posterior displacement of the fractured superior L1 endplate towards  the spinal canal causing mild spinal canal narrowing. Horizontal  sclerosis likely representing superior endplate fractures also at L3  and L4 vertebral bodies which are similar to prior CT 3/17/2023.  Posterior displacement of the fractured superior L4 endplate towards  the spinal canal causes mild spinal canal narrowing. No new loss of  vertebral body height since 3/17/2023. Presumed Schmorl's node  deformities of the inferior L2 endplate, also present on prior.    Interval healing with sclerosis across previous fractures at the  bilateral L3 pedicles, these were present on 3/17/2022.    Moderate degenerative endplate changes and loss of disc height at  L5-S1. Mild degenerative endplate changes and loss of disc height at  the other levels. Posterior disc bulge more pronounced towards the  left foraminal region at L3-L4. No significant spinal canal narrowing  on a degenerative basis. Moderate left neural foraminal narrowing at  L3-L4.      Impression    IMPRESSION:    Thoracic spine:  1. Diffuse osseous metastatic disease throughout the thoracic spine.  2. No acute fracture appreciated in the thoracic spine.  3. Bony expansion of the T9 vertebral body from bony metastases  contributes to at least mild spinal canal narrowing. This was also  present on 3/27/2023.  4. Mild degenerative changes throughout the thoracic spine without  significant spinal canal or neural foraminal narrowing appreciated on  a degenerative basis.       Lumbar spine:  1. Diffuse osseous metastatic disease throughout the lumbar spine.  2. No definite new fractures in the lumbar spine.  3. Healing with interval sclerosis across fractures at the L3 pedicles  compared to prior CT 3/17/2022.  4. Additional chronic fractures throughout  the lumbar spine with burst  type fracture of L1 and superior endplate fractures of L3 and L4.  These are unchanged.    YESSI GARCIA MD         SYSTEM ID:  [id number]   Lumbar spine MRI w/o contrast - surgery >10 yrs or none    Narrative    EXAM: MR LUMBAR SPINE W/O CONTRAST  LOCATION: [facility]  DATE/TIME: 7/31/2023 5:37 PM CDT    INDICATION: Leg weakness, numbness, hx MS and breast cancer with spinal bony metastases  COMPARISON: PET/CT 03/27/2023.  TECHNIQUE: MRI Lumbar Spine without IV contrast.    FINDINGS: Examination is mildly limited by patient motion artifact.    Alignment is unchanged. Redemonstrated heterogeneous bone marrow signal throughout the visualized lumbosacral spine including numerous focal abnormal T1 hypointense and STIR hyperintense marrow replacing lesions, including confluent lesions at T12 and S2   vertebrae. Similar chronic L1 pathologic burst fracture with mild superior endplate retropulsion and severe anterior-central vertebral body height loss. Likewise, similar chronic L4 superior endplate fracture with associated mild retropulsion. Large   Schmorl's node at the L2 inferior endplate, as before. Vertical defects in the bilateral L3 pedicles are redemonstrated compatible with chronic pathologic fractures. Asymmetric STIR hyperintense signal abnormality in the left sacral ala partially   visualized, which may represent additional confluent pathologic marrow replacement. Otherwise, remaining lumbar vertebral body heights are maintained. Conus signal is normal and terminates at L2. No perivertebral soft tissue edema. There is mild edema   within the bilateral multifidus and erector spinae muscles, which may indicate posttraumatic injury/strain. Superficial subcutaneous soft tissue edema may be posttraumatic or related to positioning. Prominence of bile duct may be related to   postcholecystectomy physiologic ectasia. Small/subcentimeter right renal cyst. Last  fully formed disc is designated L5-S1. Multilevel disc degeneration and height loss, severe at L5-S1.    T12-L1: No disc without bulge or protrusion. Facet joints are normal. Mild L1 superior endplate retropulsion indents the ventral thecal sac without spinal canal stenosis. No foraminal stenosis.    L1-L2: No disc without bulge or protrusion. Facet joints are normal. No spinal canal or foraminal stenosis.    L2-L3: Bilobed left asymmetric diffuse disc bulge. Mild facet arthropathy with ligamentum flavum thickening. Mild left foraminal stenosis. No right foraminal or spinal canal stenosis.    L3-L4: Bilobed left asymmetric diffuse disc bulge. Mild facet arthropathy with ligamentum flavum thickening. In conjunction with superior endplate retropulsion, there is borderline mild central spinal canal stenosis and mild left subarticular recess   narrowing. Mild right and moderate left foraminal stenosis.    L4-L5: Small left foraminal disc protrusion. Moderate facet arthropathy. No spinal canal or foraminal stenosis.    L5-S1: No disc without bulge or protrusion. Facet joints are normal. No spinal canal or foraminal stenosis.      Impression    IMPRESSION:  1.  Extensive osseous metastases as well as chronic pathologic fractures at L1, L3, and L4, as detailed.  2.  Partially visualized asymmetric signal abnormality in the left sacral ala may represent confluent pathologic marrow replacement. Marrow edema related to sacral fracture is difficult to exclude in the appropriate clinical context.  3.  Posterior paraspinous muscular edema can be compatible with posttraumatic injury/strain.  4.  Multilevel degenerative changes, as detailed.

## 2023-08-01 NOTE — PROGRESS NOTES
"PRIMARY DIAGNOSIS: \"GENERIC\" NURSING  OUTPATIENT/OBSERVATION GOALS TO BE MET BEFORE DISCHARGE:  ADLs back to baseline: No    Activity and level of assistance: Up with standby assistance.    Pain status: patient takes pain meds for chronic pain and cancer    Return to near baseline physical activity: No          Please review provider order for any additional goals.   Nurse to notify provider when observation goals have been met and patient is ready for discharge.    Bedside commode, home med sent to pharmacy, 1mg IV dilaudid x2 for pain, port TKO 25mL/hr, no BP right arm  "

## 2023-08-01 NOTE — PROGRESS NOTES
"PRIMARY DIAGNOSIS: \"GENERIC\" NURSING  OUTPATIENT/OBSERVATION GOALS TO BE MET BEFORE DISCHARGE:  ADLs back to baseline: No    Activity and level of assistance: Up with standby assistance.    Pain status: Taking pain meds for chronic pain    Return to near baseline physical activity: No     A&Ox4. VSS. Received dilauded x2 for pain and zofran for nausea. Ambulating SBA to bedside commode. Port accessed and NS 25mL/hr TKO.     Please review provider order for any additional goals.   Nurse to notify provider when observation goals have been met and patient is ready for discharge.  "

## 2023-08-01 NOTE — DISCHARGE SUMMARY
[facility]  Hospitalist Discharge Summary      Date of Admission:  7/31/2023  Date of Discharge:  8/1/2023  Discharging Provider: Elsa Rodgers CNP  Discharge Service: Hospitalist Service    Discharge Diagnoses   Acute intractable pain s/p fall  Metastatic breast cancer  MS    Clinically Significant Risk Factors          Follow-ups Needed After Discharge   Follow-up Appointments     Follow-up and recommended labs and tests       Follow up with oncologist as scheduled  Follow up with palliative care provider in 1- 2 weeks to review pain   medications and plan of care.            Unresulted Labs Ordered in the Past 30 Days of this Admission       No orders found for last 31 day(s).        These results will be followed up by NA    Discharge Disposition   Discharged to home  Condition at discharge: Stable    Hospital Course   Shaneka Alvarez is a 63 year old female who presents on 7/31/2023 with complaint of back and neck pain after a fall last evening. Pain is worsening of chronic pain.    Uncontrolled pain  Fall  Breast Cancer with bone metastases effecting spine  Multiple chronic spinal fractures, though no new fractures per imaging  Follows with Palliative Care for pain control  Normal acceptable pain level is 3-4/10  She has use IV diluadid and also oral MS with about the same amount of pain relief. She has been evaluated by PT/OT and appears to back at baseline in function.  She has been able to ambulate in hallway  Plan:  Continue home pain regimen with buprenorphine BID, Morphine 15-30mg Q3H  Flexeril PRN for muscle spasms  Recommend follow up with oncologist and palliative care provider which she already has established relationship.    History of PE  Continue Eliquis    Migraine  Continue home medications: Ubrelvy, Reglan and diphenhydramine   Continue Propranolol, Buspar    Multiple Sclerosis  MRI Brain 7/6/23 no significant change in demyelinating disease      Principal  Problem:    Bone metastases  Active Problems:    Metastatic breast cancer    Cancer associated pain    Intractable pain    Fall, initial encounter    Consultations This Hospital Stay   PHYSICAL THERAPY ADULT IP CONSULT  OCCUPATIONAL THERAPY ADULT IP CONSULT  CARE MANAGEMENT / SOCIAL WORK IP CONSULT    Code Status   Full Code    Time Spent on this Encounter   I, Elsa Rodgers CNP, personally saw the patient today and spent greater than 30 minutes discharging this patient.       Elsa Rodgers CNP  [facility] MEDICAL SURGICAL  [address] [place] [address]  Phone: [phone]  ______________________________________________________________________    Physical Exam   Vital Signs: Temp: 98.1  F (36.7  C) Temp src: Oral BP: 127/80 Pulse: 61   Resp: 20 SpO2: 99 % O2 Device: None (Room air)    Weight: 128 lbs 1.4 oz  General Appearance: Met patient walking in hallway.  Alert and oriented no acute distress.    Respiratory: respiratory easy, no cough.  Lung sounds clear  Cardiovascular: RRR  GI: abdomen soft and nontender  Skin: intact  Other:  gait steady.         Primary Care Physician   [facility]    Discharge Orders      Reason for your hospital stay    You were admitted for back pain after a fall     Follow-up and recommended labs and tests     Follow up with oncologist as scheduled  Follow up with palliative care provider in 1- 2 weeks to review pain medications and plan of care.     Activity    Your activity upon discharge: activity as tolerated     When to contact your care team    Call oncology and/or palliative care team if you have any of the following: increased pain. If pain is severe and unable to manage with home medications seek care at ED.     Diet    Follow this diet upon discharge: Regular       Significant Results and Procedures   Most Recent 3 CBC's:  Recent Labs   Lab Test 08/01/23  0547 07/31/23  1044 07/27/23  1620   WBC 7.6 6.5 22.3*   HGB 11.1* 11.7 14.2   MCV  91 89 88   * 121* 174     Most Recent 3 BMP's:  Recent Labs   Lab Test 08/01/23  0547 07/31/23  1044 07/27/23  1504    138 136   POTASSIUM 4.2 3.7 4.0   CHLORIDE 106 102 98   CO2 27 26 23   BUN 5.5* 7.1* 10.3   CR 0.59 0.68 0.58   ANIONGAP 7 10 15   RAFAELA 8.3* 8.6* 9.0   * 105* 114*   ,   Results for orders placed or performed during the hospital encounter of 07/31/23   CT Facial Bones without Contrast    Narrative    CT SCAN OF THE HEAD WITHOUT CONTRAST     CT SCAN OF THE FACE WITHOUT CONTRAST July 31, 2023 11:43 AM     HISTORY: Fall from standing, metastatic cancer to bone/spine, severe  pain, leg numbness, anticoagulated.    TECHNIQUE: Axial images of the head and facial bones were completed  with sagittal and coronal reformations. Radiation dose for this scan  was reduced using automated exposure control, adjustment of the mA  and/or kV according to patient size, or iterative reconstruction  technique.    COMPARISON: Brain MR 7/6/2023. Head CT 3/12/2023.    FINDINGS:   Head CT: No acute intracranial hemorrhage. No mass effect or midline  shift. Periventricular white matter hypodensities most pronounced in  the left frontal lobe which appears similar to prior MR 7/6/2023 given  differences in technique. Ventricular size is within normal limits  without evidence of hydrocephalus. No abnormal extra axial fluid  collection.    No calvarial fracture appreciated. Sclerotic 0.9 cm lesion in the  frontal bone (series 3 image 23) is unchanged since 3/12/2023.    Facial CT: No significant soft tissue swelling. No facial bone  fracture identified. The temporomandibular joints appear properly  located.     Mild mucosal thickening in the left maxillary sinus.     No mass identified within the visualized soft tissues of the neck.       Impression    IMPRESSION:     Head CT:   1. No evidence of acute intracranial hemorrhage, mass, or herniation.  2. Nonspecific white matter changes which may be due to  demyelination.  These are better seen on brain MR 7/6/2023.  3. Sclerotic lesion in the frontal bone, similar to prior CT  3/12/2023. Bony metastases are better seen by MRI.    Facial bone CT: No facial bone fracture.       YESSI GARCIA MD         SYSTEM ID:  [id number]   CT Head w/o Contrast    Narrative    CT SCAN OF THE HEAD WITHOUT CONTRAST     CT SCAN OF THE FACE WITHOUT CONTRAST July 31, 2023 11:43 AM     HISTORY: Fall from standing, metastatic cancer to bone/spine, severe  pain, leg numbness, anticoagulated.    TECHNIQUE: Axial images of the head and facial bones were completed  with sagittal and coronal reformations. Radiation dose for this scan  was reduced using automated exposure control, adjustment of the mA  and/or kV according to patient size, or iterative reconstruction  technique.    COMPARISON: Brain MR 7/6/2023. Head CT 3/12/2023.    FINDINGS:   Head CT: No acute intracranial hemorrhage. No mass effect or midline  shift. Periventricular white matter hypodensities most pronounced in  the left frontal lobe which appears similar to prior MR 7/6/2023 given  differences in technique. Ventricular size is within normal limits  without evidence of hydrocephalus. No abnormal extra axial fluid  collection.    No calvarial fracture appreciated. Sclerotic 0.9 cm lesion in the  frontal bone (series 3 image 23) is unchanged since 3/12/2023.    Facial CT: No significant soft tissue swelling. No facial bone  fracture identified. The temporomandibular joints appear properly  located.     Mild mucosal thickening in the left maxillary sinus.     No mass identified within the visualized soft tissues of the neck.       Impression    IMPRESSION:     Head CT:   1. No evidence of acute intracranial hemorrhage, mass, or herniation.  2. Nonspecific white matter changes which may be due to demyelination.  These are better seen on brain MR 7/6/2023.  3. Sclerotic lesion in the frontal bone, similar to prior CT  3/12/2023.  Bony metastases are better seen by MRI.    Facial bone CT: No facial bone fracture.       YESSI GARCIA MD         SYSTEM ID:  [id number]   CT Cervical Spine w/o Contrast    Narrative    CT CERVICAL SPINE WITHOUT CONTRAST July 31, 2023 11:45 AM     HISTORY: Fall from standing, metastatic cancer to bone/spine, severe  pain, leg numbness, anticoagulated.     TECHNIQUE: Axial images of the cervical spine were obtained without  intravenous contrast. Multiplanar reformations were performed.  Radiation dose for this scan was reduced using automated exposure  control, adjustment of the mA and/or kV according to patient size, or  iterative reconstruction technique.    COMPARISON: Cervical spine MR 1/31/2020.    FINDINGS: Cervical spine alignment is grossly within normal limits. No  acute lucent fracture line appreciated. No significant loss of  vertebral body height. Mixed lytic and sclerotic lesions in several  vertebral bodies particularly C2, C4, left C6, and C7. Lytic and  sclerotic lesion also seen in the left C5 transverse process. These  are compatible with bony metastases and were also present on MR  1/31/2020 although comparison is difficult given differences in  technique. Moderate degenerative endplate changes and loss of disc  height at C5-C6 and C6-C7. Mild degenerative endplate changes and loss  of disc height at the other levels. No significant facet hypertrophy.  No significant spinal canal narrowing at any level. Moderate right  neural foraminal narrowing at C5-C6.      Impression    IMPRESSION:   1. No evidence of acute fracture or subluxation in the cervical spine.  2. Bony metastatic disease throughout the cervical spine. No definite  pathologic fracture. Bony metastases were present on MR 1/31/2020.  Comparison to prior MR is difficult given differences in technique.    YESSI GARCIA MD         SYSTEM ID:  [id number]   CT Lumbar Spine w/o Contrast    Narrative    CT THORACIC SPINE WITHOUT CONTRAST     CT  LUMBAR SPINE WITHOUT CONTRAST  July 31, 2023 11:47 AM     HISTORY: Fall from standing, metastatic cancer to bone/spine, severe  pain, leg numbness, anticoagulated.     TECHNIQUE: Axial images of the thoracic spine were obtained without  intravenous contrast. Multiplanar reformations were performed.  Radiation dose for this scan was reduced using automated exposure  control, adjustment of the mA and/or kV according to patient size, or  iterative reconstruction technique.    Axial images of the lumbar spine were obtained without intravenous  contrast. Multiplanar reformations were performed.  Radiation dose for  this scan was reduced using automated exposure control, adjustment of  the mA and/or kV according to patient size, or iterative  reconstruction technique.    COMPARISON: PET/CT 3/27/2023. Lumbar spine CT 3/17/2022. Thoracic  spine x-ray 9/7/2022. Thoracic spine MR 4/3/2020.    FINDINGS:    Thoracic Spine: Diffuse mixed lytic and sclerotic lesions throughout  the thoracic spine compatible with bony metastatic disease. No acute  lucent fracture lines appreciated in the thoracic spine. Subtle  deformity of the superior right T11 endplate is unchanged and may  represent a Schmorl's node. No new loss of vertebral body height.    Bony expansion of the T9 posterior elements particularly towards the  right contributes to mild spinal canal narrowing. This was also  present on 3/27/2023. Mild to moderate degenerative endplate changes  and loss of disc height throughout the thoracic spine. Small posterior  disc bulge at T5-T6. No significant spinal canal or neural foraminal  narrowing appreciated on a degenerative basis.    Lumbar Spine: There are five lumbar-type vertebral bodies assumed for  the purposes of this dictation.     Mixed lytic and sclerotic lesions in several lumbar vertebral bodies  particularly the L1 vertebral body. Pathologic burst type fracture of  the L1 vertebral body with anterior wedging and loss  of approximately  50% vertebral body height appears unchanged since 3/17/2022. Slight  posterior displacement of the fractured superior L1 endplate towards  the spinal canal causing mild spinal canal narrowing. Horizontal  sclerosis likely representing superior endplate fractures also at L3  and L4 vertebral bodies which are similar to prior CT 3/17/2023.  Posterior displacement of the fractured superior L4 endplate towards  the spinal canal causes mild spinal canal narrowing. No new loss of  vertebral body height since 3/17/2023. Presumed Schmorl's node  deformities of the inferior L2 endplate, also present on prior.    Interval healing with sclerosis across previous fractures at the  bilateral L3 pedicles, these were present on 3/17/2022.    Moderate degenerative endplate changes and loss of disc height at  L5-S1. Mild degenerative endplate changes and loss of disc height at  the other levels. Posterior disc bulge more pronounced towards the  left foraminal region at L3-L4. No significant spinal canal narrowing  on a degenerative basis. Moderate left neural foraminal narrowing at  L3-L4.      Impression    IMPRESSION:    Thoracic spine:  1. Diffuse osseous metastatic disease throughout the thoracic spine.  2. No acute fracture appreciated in the thoracic spine.  3. Bony expansion of the T9 vertebral body from bony metastases  contributes to at least mild spinal canal narrowing. This was also  present on 3/27/2023.  4. Mild degenerative changes throughout the thoracic spine without  significant spinal canal or neural foraminal narrowing appreciated on  a degenerative basis.       Lumbar spine:  1. Diffuse osseous metastatic disease throughout the lumbar spine.  2. No definite new fractures in the lumbar spine.  3. Healing with interval sclerosis across fractures at the L3 pedicles  compared to prior CT 3/17/2022.  4. Additional chronic fractures throughout the lumbar spine with burst  type fracture of L1 and  superior endplate fractures of L3 and L4.  These are unchanged.    YESSI GARCIA MD         SYSTEM ID:  [id number]   CT Chest/Abdomen/Pelvis w Contrast    Narrative    CT CHEST/ABDOMEN/PELVIS W CONTRAST 7/31/2023 11:52 AM    CLINICAL HISTORY: Fall from standing, metastatic breast cancer to  bone/spine, severe pain, leg numbness, anticoagulated    TECHNIQUE: CT scan of the chest, abdomen, and pelvis was performed  following injection of IV contrast. Multiplanar reformats were  obtained. Dose reduction techniques were used.   CONTRAST: [id number]-370, 61 mL    COMPARISON: CT exams 3/27/2023 and 3/31/2022    FINDINGS:   LUNGS AND PLEURA: The central airways are clear. Mild parenchymal  scarring. No pneumothorax, pulmonary contusion, pleural fluid or mass.  Newly visualized 5 mm left upper lobe subpleural nodular opacity image  63 series 5.    MEDIASTINUM/AXILLAE: Left-sided port catheter. Bilateral intact breast  implants. Right axillary surgical clips. No thoracic adenopathy.  Calcified mediastinal lymph nodes. Normal heart size and no  pericardial effusion. Unremarkable major vessels. No mediastinal  hematoma.    HEPATOBILIARY: Cholecystectomy with stable biliary ductal dilatation.  No obstructing mass lesion or radiodense stone. No focal liver lesion.    PANCREAS: Normal.    SPLEEN: Normal.    ADRENAL GLANDS: Normal.    KIDNEYS/BLADDER: Small renal cortical cysts. No follow-up is  indicated. Symmetric heterogeneous appearance of the medial upper pole  renal cortices likely due to timing of the contrast bolus. No  hydronephrosis.    BOWEL: Normal caliber small bowel without focal mural thickening.  Normal appendix. Mild colonic stool burden. No free air or free fluid.    LYMPH NODES: Normal.    VASCULATURE: Unremarkable.    PELVIC ORGANS: Retroflexed uterus. No pelvic free fluid.    MUSCULOSKELETAL: Diffuse lytic and sclerotic osseous lesions are  stable. Multiple bilateral healed rib fractures. Stable L1 and  L4  vertebral body compression fractures. No acute osseous injury.      Impression    IMPRESSION:  1.  No acute posttraumatic findings in the chest, abdomen or pelvis.  2.  No evidence of acute osseous injury. Stable old healed bilateral  rib fractures and stable L1 and L4 vertebral body compression  fractures.  3.  Stable widespread osseous metastases.  4.  Newly visualized 5 mm left upper lobe nodular opacity. Recommend  attention on follow-up.    SELINA BELTRAN MD         SYSTEM ID:  [id number]   CT Thoracic Spine w/o Contrast    Narrative    CT THORACIC SPINE WITHOUT CONTRAST     CT LUMBAR SPINE WITHOUT CONTRAST  July 31, 2023 11:47 AM     HISTORY: Fall from standing, metastatic cancer to bone/spine, severe  pain, leg numbness, anticoagulated.     TECHNIQUE: Axial images of the thoracic spine were obtained without  intravenous contrast. Multiplanar reformations were performed.  Radiation dose for this scan was reduced using automated exposure  control, adjustment of the mA and/or kV according to patient size, or  iterative reconstruction technique.    Axial images of the lumbar spine were obtained without intravenous  contrast. Multiplanar reformations were performed.  Radiation dose for  this scan was reduced using automated exposure control, adjustment of  the mA and/or kV according to patient size, or iterative  reconstruction technique.    COMPARISON: PET/CT 3/27/2023. Lumbar spine CT 3/17/2022. Thoracic  spine x-ray 9/7/2022. Thoracic spine MR 4/3/2020.    FINDINGS:    Thoracic Spine: Diffuse mixed lytic and sclerotic lesions throughout  the thoracic spine compatible with bony metastatic disease. No acute  lucent fracture lines appreciated in the thoracic spine. Subtle  deformity of the superior right T11 endplate is unchanged and may  represent a Schmorl's node. No new loss of vertebral body height.    Bony expansion of the T9 posterior elements particularly towards the  right contributes to mild spinal  canal narrowing. This was also  present on 3/27/2023. Mild to moderate degenerative endplate changes  and loss of disc height throughout the thoracic spine. Small posterior  disc bulge at T5-T6. No significant spinal canal or neural foraminal  narrowing appreciated on a degenerative basis.    Lumbar Spine: There are five lumbar-type vertebral bodies assumed for  the purposes of this dictation.     Mixed lytic and sclerotic lesions in several lumbar vertebral bodies  particularly the L1 vertebral body. Pathologic burst type fracture of  the L1 vertebral body with anterior wedging and loss of approximately  50% vertebral body height appears unchanged since 3/17/2022. Slight  posterior displacement of the fractured superior L1 endplate towards  the spinal canal causing mild spinal canal narrowing. Horizontal  sclerosis likely representing superior endplate fractures also at L3  and L4 vertebral bodies which are similar to prior CT 3/17/2023.  Posterior displacement of the fractured superior L4 endplate towards  the spinal canal causes mild spinal canal narrowing. No new loss of  vertebral body height since 3/17/2023. Presumed Schmorl's node  deformities of the inferior L2 endplate, also present on prior.    Interval healing with sclerosis across previous fractures at the  bilateral L3 pedicles, these were present on 3/17/2022.    Moderate degenerative endplate changes and loss of disc height at  L5-S1. Mild degenerative endplate changes and loss of disc height at  the other levels. Posterior disc bulge more pronounced towards the  left foraminal region at L3-L4. No significant spinal canal narrowing  on a degenerative basis. Moderate left neural foraminal narrowing at  L3-L4.      Impression    IMPRESSION:    Thoracic spine:  1. Diffuse osseous metastatic disease throughout the thoracic spine.  2. No acute fracture appreciated in the thoracic spine.  3. Bony expansion of the T9 vertebral body from bony  metastases  contributes to at least mild spinal canal narrowing. This was also  present on 3/27/2023.  4. Mild degenerative changes throughout the thoracic spine without  significant spinal canal or neural foraminal narrowing appreciated on  a degenerative basis.       Lumbar spine:  1. Diffuse osseous metastatic disease throughout the lumbar spine.  2. No definite new fractures in the lumbar spine.  3. Healing with interval sclerosis across fractures at the L3 pedicles  compared to prior CT 3/17/2022.  4. Additional chronic fractures throughout the lumbar spine with burst  type fracture of L1 and superior endplate fractures of L3 and L4.  These are unchanged.    YESSI GARCIA MD         SYSTEM ID:  [id number]   Lumbar spine MRI w/o contrast - surgery >10 yrs or none    Narrative    EXAM: MR LUMBAR SPINE W/O CONTRAST  LOCATION: [facility]  DATE/TIME: 7/31/2023 5:37 PM CDT    INDICATION: Leg weakness, numbness, hx MS and breast cancer with spinal bony metastases  COMPARISON: PET/CT 03/27/2023.  TECHNIQUE: MRI Lumbar Spine without IV contrast.    FINDINGS: Examination is mildly limited by patient motion artifact.    Alignment is unchanged. Redemonstrated heterogeneous bone marrow signal throughout the visualized lumbosacral spine including numerous focal abnormal T1 hypointense and STIR hyperintense marrow replacing lesions, including confluent lesions at T12 and S2   vertebrae. Similar chronic L1 pathologic burst fracture with mild superior endplate retropulsion and severe anterior-central vertebral body height loss. Likewise, similar chronic L4 superior endplate fracture with associated mild retropulsion. Large   Schmorl's node at the L2 inferior endplate, as before. Vertical defects in the bilateral L3 pedicles are redemonstrated compatible with chronic pathologic fractures. Asymmetric STIR hyperintense signal abnormality in the left sacral ala partially   visualized, which may  represent additional confluent pathologic marrow replacement. Otherwise, remaining lumbar vertebral body heights are maintained. Conus signal is normal and terminates at L2. No perivertebral soft tissue edema. There is mild edema   within the bilateral multifidus and erector spinae muscles, which may indicate posttraumatic injury/strain. Superficial subcutaneous soft tissue edema may be posttraumatic or related to positioning. Prominence of bile duct may be related to   postcholecystectomy physiologic ectasia. Small/subcentimeter right renal cyst. Last fully formed disc is designated L5-S1. Multilevel disc degeneration and height loss, severe at L5-S1.    T12-L1: No disc without bulge or protrusion. Facet joints are normal. Mild L1 superior endplate retropulsion indents the ventral thecal sac without spinal canal stenosis. No foraminal stenosis.    L1-L2: No disc without bulge or protrusion. Facet joints are normal. No spinal canal or foraminal stenosis.    L2-L3: Bilobed left asymmetric diffuse disc bulge. Mild facet arthropathy with ligamentum flavum thickening. Mild left foraminal stenosis. No right foraminal or spinal canal stenosis.    L3-L4: Bilobed left asymmetric diffuse disc bulge. Mild facet arthropathy with ligamentum flavum thickening. In conjunction with superior endplate retropulsion, there is borderline mild central spinal canal stenosis and mild left subarticular recess   narrowing. Mild right and moderate left foraminal stenosis.    L4-L5: Small left foraminal disc protrusion. Moderate facet arthropathy. No spinal canal or foraminal stenosis.    L5-S1: No disc without bulge or protrusion. Facet joints are normal. No spinal canal or foraminal stenosis.      Impression    IMPRESSION:  1.  Extensive osseous metastases as well as chronic pathologic fractures at L1, L3, and L4, as detailed.  2.  Partially visualized asymmetric signal abnormality in the left sacral ala may represent confluent pathologic  marrow replacement. Marrow edema related to sacral fracture is difficult to exclude in the appropriate clinical context.  3.  Posterior paraspinous muscular edema can be compatible with posttraumatic injury/strain.  4.  Multilevel degenerative changes, as detailed.         *Note: Due to a large number of results and/or encounters for the requested time period, some results have not been displayed. A complete set of results can be found in Results Review.       Discharge Medications   Current Discharge Medication List        START taking these medications    Details   cyclobenzaprine (FLEXERIL) 5 MG tablet Take 1 tablet (5 mg) by mouth 3 times daily as needed for muscle spasms  Qty: 10 tablet, Refills: 0    Associated Diagnoses: Intractable pain           CONTINUE these medications which have NOT CHANGED    Details   apixaban ANTICOAGULANT (ELIQUIS ANTICOAGULANT) 5 MG tablet Take 1 tablet (5 mg) by mouth 2 times daily  Qty: 60 tablet, Refills: 11    Associated Diagnoses: Acute pulmonary embolism without acute cor pulmonale, unspecified pulmonary embolism type (H)      Buprenorphine HCl (BELBUCA) 300 MCG FILM buccal film Place 1 Film (300 mcg) inside cheek every 12 hours  Qty: 60 each, Refills: 0    Associated Diagnoses: Sacroiliac joint pain      busPIRone (BUSPAR) 15 MG tablet Take 1 tablet (15 mg) by mouth 2 times daily  Qty: 180 tablet, Refills: 0    Associated Diagnoses: Anxiety      calcium citrate-vitamin D (CITRACAL) 315-250 MG-UNIT TABS Take 1 tablet by mouth every morning       Cholecalciferol (VITAMIN D3 PO) Take 2,000 Units by mouth every morning       galcanezumab-gnlm (EMGALITY) 120 MG/ML injection Inject 240 mg subcutaneous first month and then inject 120 mg subcutaneous every 28 days  Qty: 2 mL, Refills: 11    Associated Diagnoses: Intractable migraine with aura with status migrainosus      levofloxacin (LEVAQUIN) 500 MG tablet Take 1 tablet (500 mg) by mouth daily  Qty: 5 tablet, Refills: 0     "Associated Diagnoses: Bilateral malignant neoplasm of breast in female, unspecified estrogen receptor status, unspecified site of breast (H); Abnormal flushing and sweating; Generalized muscle weakness      LORazepam (ATIVAN) 0.5 MG tablet Take 1-2 tablets (0.5-1 mg) by mouth every 6 hours as needed for anxiety  Qty: 6 tablet, Refills: 0    Associated Diagnoses: Primary malignant neoplasm of breast with metastasis (H)      metoclopramide (REGLAN) 5 MG tablet Take 1-2 tablets (5-10 mg) by mouth every 6 hours as needed (nausea/vomiting/migraine)  Qty: 20 tablet, Refills: 5    Associated Diagnoses: Intractable migraine with aura with status migrainosus      morphine (MSIR) 15 MG IR tablet Take 1-2 tablets (15-30 mg) by mouth every 3 hours as needed for pain  Qty: 120 tablet, Refills: 0    Associated Diagnoses: Cancer associated pain; Metastatic breast cancer      Needle, Disp, 27G X 1/2\" MISC 1 each at onset of headache (migraine)  Qty: 1 each, Refills: 0    Associated Diagnoses: Intractable migraine with aura with status migrainosus      OLANZapine (ZYPREXA) 5 MG tablet Take 2.5-5 mg by mouth 2 times daily as needed for nausea      ondansetron (ZOFRAN ODT) 4 MG ODT tab Take 4 mg by mouth every 8 hours as needed for nausea      propranolol ER (INDERAL LA) 80 MG 24 hr capsule Take 1 capsule by mouth in the morning and 2 capsules by mouth at night if tolerated  Qty: 270 capsule, Refills: 3    Associated Diagnoses: Migraine without aura and without status migrainosus, not intractable      traZODone (DESYREL) 50 MG tablet TAKE 1 TABLET BY MOUTH ONCE DAILY AT BEDTIME  Qty: 60 tablet, Refills: 0    Associated Diagnoses: Insomnia, unspecified type      ubrogepant (UBRELVY) 100 MG tablet Take 1 tablet (100 mg) by mouth at onset of headache (may repeat in 2 hours as needed. Max 200 mg in 24 hours.)  Qty: 16 tablet, Refills: 11    Associated Diagnoses: Migraine without aura and without status migrainosus, not intractable    "   venlafaxine (EFFEXOR XR) 75 MG 24 hr capsule Take 3 capsules (225 mg) by mouth daily  Qty: 270 capsule, Refills: 3    Associated Diagnoses: Anxiety; Major depressive disorder, recurrent episode, moderate (H)      vitamin B-2 (RIBOFLAVIN) 25 MG TABS tablet Take 1 tablet by mouth daily      ketorolac (TORADOL) 15 MG/ML injection Inject 1 mL (15 mg) into the muscle once as needed (migraine severe)  Qty: 1 mL, Refills: 0    Associated Diagnoses: Intractable migraine with aura with status migrainosus      Zavegepant HCl 10 MG/ACT SOLN Spray 10 mg in nostril See Admin Instructions Spray a single spray in one nostril, as needed; MAX one spray /24 hour period  Qty: 6 each, Refills: 11    Associated Diagnoses: Intractable migraine with aura with status migrainosus           Allergies   Allergies   Allergen Reactions    Bactrim [Sulfamethoxazole-Trimethoprim] Other (See Comments)     Internal bleeding    Copaxone [Glatiramer] Hives

## 2023-08-01 NOTE — PROGRESS NOTES
08/01/23 1300   Appointment Info   Signing Clinician's Name / Credentials (PT) Yanely Cruz, PT, DPT   Rehab Comments (PT) eval only   Living Environment   People in Home spouse   Current Living Arrangements house   Home Accessibility no concerns   Transportation Anticipated family or friend will provide   Living Environment Comments Lives on one level of home. Had FWW but reports not using   Self-Care   Usual Activity Tolerance good   Current Activity Tolerance moderate   Regular Exercise No   Equipment Currently Used at Home cane, quad;cane, straight;hospital bed;walker, rolling   Fall history within last six months yes   Number of times patient has fallen within last six months 3   Activity/Exercise/Self-Care Comment pt/hsuband report minimal activity at baseline   General Information   Onset of Illness/Injury or Date of Surgery 07/31/23   Referring Physician Eloy Tomlinson APRN CNP   Patient/Family Therapy Goals Statement (PT) go home and not fall   Pertinent History of Current Problem (include personal factors and/or comorbidities that impact the POC) Pt is a 63 year old female that was admitted with worsening back and neck pain following a fall. She has a past medical history including Uncontrolled pain Fall Breast Cancer with bone metastases effecting spine, History of PE, Migraine, Multiple Sclerosis MRI Brain 7/6/23 no significant change in demyelinating disease. She lives with her  on the main level of their home.   Existing Precautions/Restrictions fall   Weight-Bearing Status - LUE full weight-bearing   Weight-Bearing Status - RUE full weight-bearing   Weight-Bearing Status - LLE full weight-bearing   Weight-Bearing Status - RLE full weight-bearing   General Observations PT orders: eval and treat for d/c recs. Activity orders: ambulate w/assist   Cognition   Affect/Mental Status (Cognition) WNL   Orientation Status (Cognition) oriented x 4   Follows Commands (Cognition) WNL   Cognitive  Status Comments no concerns   Pain Assessment   Patient Currently in Pain   (dilated has started to kick in so pain is doing fine now)   Integumentary/Edema   Integumentary/Edema no deficits were identifed   Posture    Posture Forward head position   Range of Motion (ROM)   Range of Motion ROM is WNL   Strength (Manual Muscle Testing)   Strength (Manual Muscle Testing) Able to perform R SLR;Able to perform L SLR   Strength Comments 4-/5 hip flexion, knee flexion, and knee extension   Bed Mobility   Comment, (Bed Mobility) Able to demonstrate independent supine <> sit and rolling/scooting in flat bed w/o bed rails   Transfers   Comment, (Transfers) sit <> stand from edge of bed and chair independently   Gait/Stairs (Locomotion)   [place] Level (Gait) supervision   Assistive Device (Gait) other (see comments)  (none)   Distance in Feet 75ft   Pattern (Gait) swing-through   Deviations/Abnormal Patterns (Gait) guy decreased;festinating/shuffling;gait speed decreased;stride length decreased   Comment, (Gait/Stairs) Pt able to ambulate with supervision. Slowed speed with slightly inconsistent path but no LOB.   Balance   Balance Comments Able to maintain feet together balance. Struggled with tandem stance for >15 sec.   Sensory Examination   Sensory Perception WFL   Clinical Impression   Criteria for Skilled Therapeutic Intervention Evaluation only   PT Diagnosis (PT) general fatigue and decreased activity tolerance   Influenced by the following impairments recent fall and increased pain   Functional limitations due to impairments ambulation   Clinical Presentation (PT Evaluation Complexity) Stable/Uncomplicated   Clinical Presentation Rationale based on history, eval, and clinical reasoning   Clinical Decision Making (Complexity) moderate complexity   Anticipated Equipment Needs at Discharge (PT)   (none - pt has equipment at home)   Risk & Benefits of therapy have been explained evaluation/treatment results  reviewed;care plan/treatment goals reviewed;risks/benefits reviewed;current/potential barriers reviewed;participants voiced agreement with care plan;participants included;patient;spouse/significant other   Clinical Impression Comments Pt is mobilizing near her baseline and has good support from her . She has canes and a walker at home as needed for MS flare ups. Spent time discussing consistent use of cane to increase safety in and out of the house. Pt and  report understanding. Pt able to demonstrate independent bed mobility and transfers as well as 75ft ambulation w/supervision. She is able to complete moiblity needed for return home.   PT Total Evaluation Time   PT Eval, Moderate Complexity Minutes (41992) 30   PT Discharge Planning   PT Plan No further PT needs   PT Discharge Recommendation (DC Rec) home with assist  (Recommended use of cane in home and outside for stability once returning to normal activities for added safety)   PT Rationale for DC Rec Pt is mobilizing near baseline per pt and . Able to ambulate at a slowed pace w/supervision in hallways and was otherwise independent with mobility. Pt and  understanding of PT recommendation for use of cane at home, especially outside, to reduce use of surfaces for support. They live on main level so no stair concerns. Pt will be able to safely return to her home environment with continued support of her  and use of assistive devices as needed.   PT Brief overview of current status Independent bed mobility in flat bed w/o bed rails. Independent sit <> stand. SBA ambulation at slowed pace for 75ft.   Total Session Time   Total Session Time (sum of timed and untimed services) 30     Yanely Cruz, PT, DPT  [phone]  [facility]  Thank you for this referral

## 2023-08-01 NOTE — PROGRESS NOTES
"PRIMARY DIAGNOSIS: ACUTE PAIN  OUTPATIENT/OBSERVATION GOALS TO BE MET BEFORE DISCHARGE:  1. Pain Status: Improved-controlled with oral pain medications. Given oral Morphine 15mg for 6/10 deep pain on her bone. After administration, pt was seen resting in bed quietly, rating the pain at 5/10 and joking around.     2. Return to near baseline physical activity: Yes    3. Cleared for discharge by consultants (if involved): No    Pt states feeling better today than yesterday. Pt is able to ambulate in the hallway with one assist. Noted her right leg is weaker than the other extremity. Staff placed a chair nearby just in case she felt weaker to walk. Reported tingling sensation in her feet. Reported a \"little dizziness\" when she got up from bed. Pt verbalized wanting to go home today.     /72 (BP Location: Left arm)   Pulse 79   Temp 97.8  F (36.6  C) (Oral)   Resp 20   Ht 1.575 m (5' 2\")   Wt 58.1 kg (128 lb 1.4 oz)   SpO2 100%   BMI 23.43 kg/m      "

## 2023-08-01 NOTE — PROGRESS NOTES
"PRIMARY DIAGNOSIS: \"GENERIC\" NURSING  OUTPATIENT/OBSERVATION GOALS TO BE MET BEFORE DISCHARGE:  ADLs back to baseline: No    Activity and level of assistance: Up with standby assistance.    Pain status: Taking pain meds for chronic pain    Return to near baseline physical activity: No     A&Ox4. VSS on rm. Received dilauded x3 overnight for pain. Has nausea with no relief from zofran, compazine given. Home dose of reglan and benadryl ordered for headache. NS 25mL/hr TKO via port.     Please review provider order for any additional goals.   Nurse to notify provider when observation goals have been met and patient is ready for discharge.  "

## 2023-08-03 NOTE — PROGRESS NOTES
"[facility] offered Clinic Care Coordination to an established [facility] eligible patient and patient declined CCC at this time.     Clinic Care Coordination Contact  [facility]: Post-Discharge Note  SITUATION                                                      Admission:    Admission Date: 07/31/23   Reason for Admission: Fall  Discharge:   Discharge Date: 08/01/23  Discharge Diagnosis: Acute intractable pain s/p fall, Metastatic breast cancer, MS    BACKGROUND                                                      Per hospital discharge summary and inpatient provider notes:    Shaneka Alvarez is a 63 year old female who presents to the emergency room with her  over concern of fall and back and neck pain.  Has significant past medical history for breast cancer that has metastasized to her bone.  She says that she has numerous mets in her spine.  Yesterday, she was walking from the gazebo to the home when she tripped and fell.  She hit her face against a wagon and hit the ground hard.  Did not lose consciousness.  She says that she has had severe pain and has difficulty with mobility secondary to the pain.  Did try home dose of IR morphine per the , which is an infrequently used medication, but it did not help with her pain.  She continues to have severe pain and feels like she cannot move, stand, or get around without any assistance.  She also takes a blood thinner.     ASSESSMENT      Discharge Assessment  How are you doing now that you are home?: \"I'm sore but otherwise I think I'm doing okay.\"  How are your symptoms? (Red Flag symptoms escalate to triage hotline per guidelines): Improved  Do you feel your condition is stable enough to be safe at home until your provider visit?: Yes  Does the patient have their discharge instructions? : Yes  Does the patient have questions regarding their discharge instructions? : No  Were you started on any new medications or were there changes to any of your previous " medications? : Yes  Does the patient have all of their medications?: Yes  Do you have questions regarding any of your medications? : No  Do you have all of your needed medical supplies or equipment (DME)?  (i.e. oxygen tank, CPAP, cane, etc.): Yes  Discharge follow-up appointment scheduled within 14 calendar days? : Yes  Discharge Follow Up Appointment Date: 08/08/23  Discharge Follow Up Appointment Scheduled with?: Specialty Care Provider (Radiology appt. 8/8. Oncology appt. 8/10.)    Post-op ([facility] CTA Only)  If the patient had a surgery or procedure, do they have any questions for a nurse?: No      PLAN                                                      Outpatient Plan:     Follow-ups Needed After Discharge  Follow-up Appointments     Follow-up and recommended labs and tests       Follow up with oncologist as scheduled  Follow up with palliative care provider in 1- 2 weeks to review pain   medications and plan of care.         Future Appointments   Date Time Provider Department [place]   8/8/2023 12:00 PM [address]   8/10/2023  8:30 AM Dewayne Zepeda MD [facility]   8/18/2023 11:00 AM NURSE ONLY CANCER CENTER [facility]   8/18/2023 11:30 AM [address] [facility] [place]   8/22/2023 11:00 AM Aruna Parikh RD [facility]   8/25/2023  1:00 PM [address] [facility]ION [facility] [place]   9/1/2023  7:00 AM Emelia Mixon APRN CNP [facility]   9/6/2023 10:00 AM Alva Whitaker MD [facility]         For any urgent concerns, please contact our 24 hour nurse triage line: [phone] ([phone])         KIRBY Cormier  [phone]  [facility]

## 2023-08-10 NOTE — LETTER
8/10/2023         RE: Shaneka Alvarez  [id number] [facility] [id number]        Dear Colleague,    Thank you for referring your patient, Shaneka Alvarez, to the [facility]. Please see a copy of my visit note below.    Virtual Visit Details    Type of service:  Video Visit     Originating Location (pt. Location): Home    Distant Location (provider location):  On-site  Platform used for Video Visit: Teach 'n Go  Video start time.  8:40 AM  Video stop time.  9:01 AM    ONCOLOGY FOLLOW UP NOTE: 8/10/23        I took the history from reviewing the previous notes that she was following with Dr. Amaya.  I have copied and updated from prior notes.    ONCOLOGY History:    1.  January 2006:  Diagnosed with Stage IIB, T2 N1 M0 invasive lobular carcinoma of the right breast.  Final pathology showed a 4.5 x 3.8 x 2.5 cm, 01/14 lymph nodes positive.  Estrogen, progesterone receptor positive, HER-2 negative.     2.  Genetics.  BRCA1 and 2 mutations not detected. 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       THERAPY TO DATE:   1.  2006:  ECOG 2104 protocol of dose-dense Adriamycin, Cytoxan and Avastin x4 cycles followed by Taxol and Avastin x4 cycles.   2.  03/2007:  Completed 1 year Avastin.   3.  11/2006-01/2007:  Radiotherapy to the right breast of 5040 cGy.   4.  03/[id number]:  Aromasin.  Stopped after moving to the [facility].   5.  01/2016:  Right modified radical mastectomy with latissimus dorsi flap reconstruction and a left prophylactic mastectomy with latissimus dorsi flap reconstruction.     She recently had MRI of the brain as a follow-up for multiple sclerosis and there were multiple calvarial lesions noted which was suspicious for metastatic disease.  There was no evidence of new multiple sclerosis lesions.  She had a PET scan on 8/30/2019 which showed widespread bone metastatic lesions (calvarium, spine, sacrum, pelvis, ribs most prominent at C7, T3, L1 and L4),  hypermetabolic 3 cm lesion in LLL, and mediastinal/hilar LN. CEA wnl at 1.9 and C27-29 elevated to 415 (28 on 8/23/16). A CT guided biopsy of the right iliac bone was obtained on 9/4/19, pathology consistent with metastatic adenocarcinoma (breast), ER/OR negative, HER-2 negative PDL 1 < 1%. A second biopsy was take of the LLL nodule via EBUS on 9/5/19 did not show any malignancy.    C/T/L spine MRI 8/3/19 to 9/2/19 with numerous enhancing lesions of the C, T and L spine, largest around T9 and L1. No evidence of cord compression. Has a L1 compression fracture and impending L4.     Received radiation T12-L5 3000 cGy in 10 fractions from 9/6/2019 till 9/18/2019.  Neurosurgery recommended no surgical intervention but to wear Gorge brace when out of bed and HOB >30 degrees    She started palliative Xeloda 2000/1500 on 10/1/2019 but after just a few doses she noticed nausea, red eyes blurred vision, loss of appetite.  She stopped taking it after 5 doses and was seen by nurse practitioner on 10/7/2019 when she was improving.  At that point she was restarted on Xeloda at a lower dose of 1000 mg twice a day.  As she was not able to tolerate even the lower dose with extreme nausea and vomiting and feeling extremely fatigued and blurry vision, we decided on stopping Xeloda.    We decided on repeating scans and MRI brain on 10/25/2019 showed no intracranial metastatic disease.   Multiple enhancing calvarial lesions may be increased in number and are suggestive of metastatic disease. Thinner imaging on the current study may identify the smaller lesions and may be responsible for  identified more lesions.   There is a single focus of T2 hyperintense signal within the anterior right temporal lobe may represent interval demyelination. There is no evidence for active demyelination.    PET/CT on 11/1/2019 showed favorable response to therapy and Overall FDG uptake within scattered osseous metastases is decreased since 8/30/2019,  particularly within the pelvis. Increased sclerotic appearance of L1 and L4 pathologic compression deformities, likely sequela of recently completed palliative radiation therapy.    No significant FDG uptake in previously demonstrated hypermetabolic mediastinal lymph nodes. Biopsy negative on 9/5/2019.  There is also decreased FDG uptake in the left lower lobe (biopsy negative for  malignancy), now with dense consolidation containing air bronchograms suggestive of infection versus aspiration.  There is diffuse FDG uptake within the esophagus, consistent with esophagitis.    Because of intolerance to Xeloda we decided on switching to weekly paclitaxel on 11/5/2019.    C#2 Taxol 12/4/19- started with 70mg/m2 dose reduction    C#3 Taxol 12/30/2019     PET/CT on 1/24/2020 showed progression of the disease in some of the bone lesions including increase in size and lytic lesion within the vertebral body of C4 measuring 1 cm as opposed to 0.6 cm previously and a new central soft tissue nodule with SUV max 4.1 when previously it was non-hypermetabolic.  There is also some progression noted in the right proximal femur and right iliac bone.  There is decreased metabolic uptake in the right lamina of T9 with SUV max 4.7 when previously it was 8.0.  Other lesions are stable.    CA 27-29 also had increased significantly and it was 257 on 1/28/2020.    We decided on switching to eribulin on 1/28/2020.    C#2 2/18/2020  C#2 D#8 2/25/2020    C#3 D#1 3/18/2020 -delayed by 1 week as per patient's preference because she wanted to visit her family.    She had a repeat PET/CT on 3/27/2020 which showed stable size of the bone metastatic lesions although the FDG avidity was less, consistent with treatment response.    She had MRI of the thoracic spine on 4/3/2020 and it was compared to the one which was done in August 2019 and it showed increased size of several metastatic lesions most notably at T9 but also at T5-T7.  Other lesions at  T10, T11 and T12 appeared improved.    CA 27-29 was also down to 222 on 3/18/2020.    Cycle #4 eribulin ( dose reduced to 1.2 mg/m2 )-4/7/2020-   Cycle #5 Eribulin ( 1.2 mg/m2 )- 4/28/2020   Cycle #6 Eribulin ( 1.2 mg/m2 )- 5/19/2020     Cycle #7 Eribulin ( 1.2 mg/m2 )- 6/9/2020    Cycle #8 Eribulin ( 1.2 mg/m2 )- 6/30/2020     She had a repeat PET scan on 7/17/2020 which showed incidental finding of new acute bilateral pulmonary embolism in the distal left main pulmonary artery extending in the left upper and lower lobes and in the segmental and branch arteries in the right lower lobe.    It also showed mildly increased FDG avidity in the lytic lesion in the T9 lamina and right pedicle with SUV max 5.3, previously 3.9.  That is also slightly increased FDG uptake to the left anterior fifth rib at the costochondral junction SUV max 3.9, previously 2.5.  There is slightly decreased FDG uptake in the right femoral neck lesion SUV max 2.2, previously 2.9.  FDG uptake in other bone lesions is fairly stable.  No new metastasis are seen.    CA 27-29 was 308 on 6/30/2020.  It was 286 on 5/19/2020.    Overall this is consistent with only slight progression versus stable disease.    She was started on Lovenox.    Cycle #9 eribulin 7/21/2020. CA 27-29 was 238    C#10 eribulin 8/18/2020. ( delayed by one week for ANC 0.9 )    C#11 Eribulin 9/8/2020    C#12 Eribulin 9/29/2020     C#13 Eribulin 10/20/2020     PET scan on 11/6/2020 shows progression of the disease with increased FDG uptake in the lytic lesions in the T9/T10 and right posterolateral elements as well as increased FDG uptake in the previously known hypermetabolic right iliac bone lesion suggesting recurrence of previously treated metastasis.  There is new subtle lesion in the right manubrium.  There is also a new fracture in the anterolateral left fourth rib with associated uptake and this could be a pathologic fracture.  Healing fracture in the left anterior fifth  rib lesion.  There is resolution of the left pulmonary emboli.  Decreased FDG uptake of the esophagus consistent with improving inflammation.    CA 27-29 on 10/20/2020 was also elevated at 489.    C#1 Trodelvy on 11/11/2020.  Cycle #2 Trodelvy 12/2/2020  Cycle number 2-day #8 Trodelvy 12/8/2020.    12/15/2020-12/16/2020.  She was admitted to the hospital with nausea vomiting and dehydration.  She had tachycardia and initial lactic acid of 5.  It decreased to 1.4 after 2 L of normal saline.  She also had hypokalemia and ANC was 1.3.  She was treated with IV fluids.  Procalcitonin was negative.  CTA of the chest was negative for pulmonary embolism or pneumonia.  No bacterial infection was documented.  Her medication which she was initially unable to tolerate at home, were restarted and she was discharged home once started to feel better.      We delayed the start of cycle number 3 x 1 week and decrease the dose of chemotherapy by 25%.  She also had neutropenia with ANC of 1.0.      She started cycle number 3-day #1 on 12/29/2020.  CA 27-29 was 392.    Cycle number 3-day #8 1/5/2021.    C#4 Trodelvy 1/20/2021- 75% dose    2/5/2021.  PET/CT overall shows a good response to treatment with improvement of previously hypermetabolic lesions in the spine and pelvis and stability of other bone meta stasis.  There is a single lytic lesion in the right iliac bone which demonstrates slight Yimi increased FDG uptake and has SUV max 4.7 while previously it was SUV max 3.4.  There was diffuse wall thickening of the esophagus with uptake in the esophagus in the fundus of the stomach and this could be seen with inflammation.    Trodelvy cycle #5 - 2/10/2021.  75% of the dose.  CA 27-29 was 337.    Trodelvy cycle #6 - 3/3/2021.  75% of the dose.  Trodelvy cycle #6, D#8 - 3/10/2021.  75% of the dose.    Trodelvy C#8, D#8 on 4/21/2021  CA 27-29 stable at 371 on 4/14/2021    Trodelvy, cycle #9- 5/5/2021        On 2/25/2020 when she had  biopsy of the right acetabulum and it was consistent with metastatic lobular carcinoma.  Again it was Triple Negative.     On 3/18/2020 when she also met with Dr. Arelis Arshad who also advised testing for androgen receptor studies on the biopsy specimen.  Tumor was diffusely androgen receptor positive.      5/26/2021-cycle #10 Trodelvy     CA 27-29 was 545.    6/11/2021.  PET/CT shows mildly increased uptake in several bone lesions for example in the left anterior iliac crest, mid right iliac crest and left ischium.  Uptake in other bone lesions are similar to previously.    Because of minimal progression of the disease and she is tolerating chemotherapy very well, we decided on continuing the same chemotherapy.    6/16/2021-Trodelvy cycle #11    7/7/2021 -Trodelvy cycle #12    9/14/2021-Trodelvy-cycle #15, day #8.    9/8/2021-CA 27-29 was more elevated and it was 1176.    PET/CT on 9/24/2021 showed stable findings with no evidence of FDG avid metastatic disease within the body.  Left axillary lymph nodes are prominent and hypermetabolic and likely from recent vaccinations in the left deltoid muscle.  Healed bony metastasis seems stable.      Cycle #16, Trodelvy 9/28/2021.  Cycle #17, day #8 Trodelvy was on 10/26/2021.  Cycle #18, day #8 Trodelvy on 11/22/2021       She saw Dr. Mejia whom on 10/6/2021.  She was not considered eligible for Enfortumab trial.    Cycle #19, Trodelvy on 12/7/2021 12/21/2021.  PET/CT showed progression of bony metastatic disease.  There is increase hypermetabolism in both the right and left iliac bones and the sternum.  Other bone lesions are stable.    There is new scattered areas of groundglass throughout both the lungs and these findings are indeterminate but may represent an infectious etiology.  Interval resolution of left axillary FDG avid lymphadenopathy.    She was started on Casodex 150 mg daily on 1/6/2022.    She was admitted to the hospital from 3/17/2022-3/19/2022 for  vomiting and diarrhea and  severe back pain.  I reviewed the discharge summary.  CT lumbar spine showed a stable severe chronic L1 pathologic compression fracture.  Stable mild compression deformity of superior endplate of L3 and superior endplate of L4.  The upper margin of the L4 fracture extends slightly into the spinal canal without high-grade canal stenosis and this is also stable.  Nondisplaced fractures coursing through the L3 pedicles bilaterally were seen which were not clearly seen previously this could be acute and likely pathologic.  No extraspinal soft tissue abnormality.  Neurosurgery recommended conservative management.  Her pain was controlled and she was discharged home as she felt better with this.      3/31/2022-PET/CT shows progressive bone meta stasis with progressive FDG uptake in the following lesions. Left iliac crest lesion with max SUV of 7.2, previously 3.5. Right mid iliac crest lytic lesion shows maximum SUV of 7.8, previously 6.9.  T10 vertebral body mixed lytic and sclerotic lesion with an SUV of 7.1, previously not hypermetabolic. Thoracic vertebral bodies 8, 7, 5, and 4 also show hypermetabolic lesions were previously there was no hypermetabolism.  Some of the sclerotic osseous lesions are unchanged and do not show increased hypermetabolism compatible treated lesion such as a severe compression deformity at L1 as well as superior compression deformity at L4.  Medial right clavicle sclerotic lesion with SUV max of 4.8, previously not hypermetabolic. There is also right-sided sternal lesions.      4/14/2022--C#1- switch to single agent Doxil 50 mg per metered squared every 4 weeks.    5/13/2022-cycle #2- Doxil- dose reduced to 40 mg per metered square due to severe nausea/vomiting and migraines requiring IV fluids and IV antiemetics.    6/10/2022-cycle #3-Doxil 40 mg per metered square    7/1/2022- PET/CT shows resolution of the hypermetabolic skeletal metastasis.    7/6/2022-cycle  #4-Doxil 40 mg per metered square  8/5/2022- cycle #5-Doxil  9/1/2022.  Cycle #6-Doxil  9/30/2022- cycle #7-Doxil    10/18/2022.  PET/CT does not show evidence of any FDG activity    11/2/2022-cycle #8-Doxil.  11/30/2022-cycle #9-Doxil.  12/28/2022-cycle #10-Doxil    2/3/2023- C#11- Doxil (delayed by 1 week because neutrophil count was 0.9)-wanted to give her Onpro but at that time it was not approved by insurance.    3/3/2023.  Cycle #12 Doxil.  Given with Onpro.    Repeat PET/CT on 3/27/2023 overall showed very stable findings.  PET/CT showed focal FDG avidity in the heart. This is in an expected location of AV node or mitral valve.  This is nonspecific.  We checked an echocardiogram on 4/20/2023 which was unremarkable.  She is asymptomatic.  Continue to monitor clinically.      3/31/2023.  Cycle #13 Doxil.  Given with Onpro.     4/12/2023-she presented to ED with increased migraine headaches and nausea and vomiting.  She was treated symptomatically and was discharged home after she started to feel better.      4/28/2023.  Cycle #14 Doxil.  Given with Onpro    Cycle #16 Doxil with Onpro 6/23/2023    Cycle #17 Doxil with Onpro 7/21/2023      Interval history.  This is a video visit.    As she was having more lumbar/low back pain for which she got admitted.  MRI of the lumbar spine showed known bone metastasis and known fractures.  Flexeril helped.  She continues to be on buprenorphine.  She has not taken additional morphine but tells me that when she takes it, it helps with the pain.  She is also not taking Flexeril because she ran out of that.  She feels somewhat fatigued.  Otherwise tolerating chemotherapy well with off-and-on nausea.  Migraines are better.  No diarrhea.  No shortness of breath or infections.  No new swellings.  Neuropathy is the same as before.  She is taking vitamin D and calcium.        ECOG 1    ROS:  Rest of the comprehensive review of the system was negative.        I reviewed other history  "in epic as below.       PAST MEDICAL HISTORY:     1.  Breast cancer  2.  Multiple sclerosis.   3.  Depression.   4.  Migraines.   5.  Hypertension.   6.  Cholecystectomy and umbilical hernia repair.     SOCIAL HISTORY: She smoked for 5 years but quit many years ago.  Drinks alcohol socially.  She lives with her .  She is a teacher in middle school.       FAMILY HISTORY: She mentions that her mother had breast cancer at 49.  Both grandmothers had breast cancer but she is not sure of the age.  A couple of cousins have cancers.  Patient has 3 children who are healthy.    Current Outpatient Medications   Medication    apixaban ANTICOAGULANT (ELIQUIS ANTICOAGULANT) 5 MG tablet    Buprenorphine HCl (BELBUCA) 300 MCG FILM buccal film    busPIRone (BUSPAR) 15 MG tablet    calcium citrate-vitamin D (CITRACAL) 315-250 MG-UNIT TABS    Cholecalciferol (VITAMIN D3 PO)    cyclobenzaprine (FLEXERIL) 5 MG tablet    galcanezumab-gnlm (EMGALITY) 120 MG/ML injection    LORazepam (ATIVAN) 0.5 MG tablet    metoclopramide (REGLAN) 5 MG tablet    morphine (MSIR) 15 MG IR tablet    OLANZapine (ZYPREXA) 5 MG tablet    ondansetron (ZOFRAN ODT) 4 MG ODT tab    propranolol ER (INDERAL LA) 80 MG 24 hr capsule    traZODone (DESYREL) 50 MG tablet    ubrogepant (UBRELVY) 100 MG tablet    venlafaxine (EFFEXOR XR) 75 MG 24 hr capsule    vitamin B-2 (RIBOFLAVIN) 25 MG TABS tablet    Zavegepant HCl 10 MG/ACT SOLN    ketorolac (TORADOL) 15 MG/ML injection    levofloxacin (LEVAQUIN) 500 MG tablet    Needle, Disp, 27G X 1/2\" MISC     No current facility-administered medications for this visit.     Facility-Administered Medications Ordered in Other Visits   Medication    heparin 100 unit/mL injection 5 mL    sodium chloride (PF) 0.9% PF flush 10 mL        Allergies   Allergen Reactions    Bactrim [Sulfamethoxazole W/Trimethoprim]      Internal bleeding    Copaxone [Glatiramer] Hives          PHYSICAL EXAMINATION:     Ht 1.575 m (5' 2\")   Wt 56.7 " kg (125 lb)   BMI 22.86 kg/m    Wt Readings from Last 4 Encounters:   08/10/23 56.7 kg (125 lb)   07/31/23 58.1 kg (128 lb 1.4 oz)   07/27/23 55.8 kg (123 lb)   07/21/23 59.2 kg (130 lb 9.6 oz)         Constitutional.  Looks well and in no apparent distress.   Eyes.  Without eye redness or apparent jaundice.   Respiratory.  Non labored breathing. Speaking in full sentences.    Skin.  No concerning skin rashes on the skin visualized.   Neurological.  Is alert and oriented.  Psychiatric.  Mood and affect seem appropriate.      The rest of a comprehensive physical examination is deferred due to Public Health Emergency video visit restrictions.          Labs and Imaging.    Reviewed.    8/1/2023  CBC shows WBC 7.6.  Hemoglobin 11.1.  Platelets 110.  Chemistry was unremarkable.    7/21/2023.    CA 15-3 was 186.    7/3/2023    CA 15-3 was 177 on 6/23/2023      CA 15-3 was 129 on 3/31/2023      CA 15-3 was 123 on 3/3/2023.      11/30/2022     CA 15-3 was 135.    9/30/2022  CA 15-3 was 159.  CA 27-29 was 1992 on 6/10/2022  CA 27-29 was 3370 on 5/13/2022.  It was 5307 on 4/14/2022 ferritin Doxil was started.      9/28/2021.      Iron studies, ferritin is 101, iron 51, TIBC 268 and iron saturation index 19%.    PET/CT on 8/8/2023  COMPARISON: PET/CT 3/27/2023, CT cervical, thoracic, and lumbar spine  7/31/2023     FINDINGS:      HEAD/NECK:  There is no suspicious FDG uptake in the neck.  The paranasal sinuses are clear. The mastoid air cells are clear.  Unchanged subcutaneous soft tissue nodule in the occipital scalp  without suspicious activity. No suspicious activity in the brain  parenchyma.     No hypermetabolic lymph nodes are demonstrated in the neck.      The mucosal and deep spaces of the neck are unremarkable. The major  salivary glands are unremarkable. The thyroid is unremarkable. The  major vasculature of the neck are patent.     CHEST:  There is no suspicious FDG uptake in the chest. Mildly increased  uptake of  the esophagus, most prominent at the distal esophagus.     The central tracheobronchial tree is clear. No pleural effusion or  pneumothorax. No acute consolidation. 6 x 1 mm thickening at the left  major fissure without uptake which has been present on prior exams,  likely intrafissural lymph node versus scarring. No suspicious  pulmonary nodules. Dependent atelectasis.     No hypermetabolic lymph nodes are demonstrated in the chest. Partially  calcified mediastinal and hilar lymph nodes.     Heart size is within normal limits. No pericardial effusion. The  thoracic aorta and main pulmonary artery are within normal limits for  diameter. The esophagus is unremarkable. Left chest wall Port-A-Cath  terminates at the cavoatrial junction. Postoperative changes of  bilateral mastectomies and breast implants.     ABDOMEN AND PELVIS:  There is no suspicious FDG uptake in the abdomen or pelvis.     The liver is unremarkable. Cholecystectomy. Unchanged dilated common  bile duct likely related to prior cholecystectomy. Atrophic pancreas.  The spleen is unremarkable. The adrenal glands are unremarkable.     Symmetric enhancement of the kidneys. No hydronephrosis. Tiny right  renal cortical hypodensity which was present on 6/11/2021. The urinary  bladder is nondistended. Unchanged calcification in the uterus, likely  fibroid.     No hypermetabolic lymph nodes are demonstrated in the abdomen or  pelvis.     Normal caliber of the small and large bowel. Normal appendix. No free  air, free fluid, or fluid collection. Normal caliber of the abdominal  aorta.     BONES:   Multiple lytic and sclerotic osseous lesions throughout the axial and  proximal appendicular skeleton, some with mild uptake increased  compared to 3/27/2023. For example, a lytic lesion of the T8 vertebral  body demonstrates SUV max of 4.5 (previously 3.5) and a sclerotic and  lytic lesion of the right pelvis superior to the acetabulum  demonstrating SUV max of 3.9,  which was previously below background  levels.     Unchanged superior L1 compression fracture and mild L4 compression  deformity. Multiple bilateral chronic rib fracture deformities which  are unchanged. Unchanged chronic fracture of the left glenoid. No  significant spinal canal stenosis or spinal canal or spinal canal  involvement of metastasis.                                                                      IMPRESSION: In this 63-year-old woman with breast cancer with multiple  osseous metastases status post mastectomies, radiation, and  chemotherapy;  1.  Overall, the exam is felt to be stable with multiple lytic and  sclerotic lesions throughout the axial and proximal appendicular  skeleton not significantly changed, representing treated disease. Some  of the lesions demonstrate minimally increased uptake without clear  progression on CT, indeterminate if this represents technical factors  versus very early metastatic disease. No new osseous lesions.  Attention on follow-up.  2.  Stable severe chronic L1 compression fracture.  3.  Esophagitis, unchanged compared to 3/27/2023.    MRI of the lumbar spine on 7/31/2023  COMPARISON: PET/CT 03/27/2023.  TECHNIQUE: MRI Lumbar Spine without IV contrast.     FINDINGS: Examination is mildly limited by patient motion artifact.     Alignment is unchanged. Redemonstrated heterogeneous bone marrow signal throughout the visualized lumbosacral spine including numerous focal abnormal T1 hypointense and STIR hyperintense marrow replacing lesions, including confluent lesions at T12 and S2   vertebrae. Similar chronic L1 pathologic burst fracture with mild superior endplate retropulsion and severe anterior-central vertebral body height loss. Likewise, similar chronic L4 superior endplate fracture with associated mild retropulsion. Large   Schmorl's node at the L2 inferior endplate, as before. Vertical defects in the bilateral L3 pedicles are redemonstrated compatible with  chronic pathologic fractures. Asymmetric STIR hyperintense signal abnormality in the left sacral ala partially   visualized, which may represent additional confluent pathologic marrow replacement. Otherwise, remaining lumbar vertebral body heights are maintained. Conus signal is normal and terminates at L2. No perivertebral soft tissue edema. There is mild edema   within the bilateral multifidus and erector spinae muscles, which may indicate posttraumatic injury/strain. Superficial subcutaneous soft tissue edema may be posttraumatic or related to positioning. Prominence of bile duct may be related to   postcholecystectomy physiologic ectasia. Small/subcentimeter right renal cyst. Last fully formed disc is designated L5-S1. Multilevel disc degeneration and height loss, severe at L5-S1.     T12-L1: No disc without bulge or protrusion. Facet joints are normal. Mild L1 superior endplate retropulsion indents the ventral thecal sac without spinal canal stenosis. No foraminal stenosis.     L1-L2: No disc without bulge or protrusion. Facet joints are normal. No spinal canal or foraminal stenosis.     L2-L3: Bilobed left asymmetric diffuse disc bulge. Mild facet arthropathy with ligamentum flavum thickening. Mild left foraminal stenosis. No right foraminal or spinal canal stenosis.     L3-L4: Bilobed left asymmetric diffuse disc bulge. Mild facet arthropathy with ligamentum flavum thickening. In conjunction with superior endplate retropulsion, there is borderline mild central spinal canal stenosis and mild left subarticular recess   narrowing. Mild right and moderate left foraminal stenosis.     L4-L5: Small left foraminal disc protrusion. Moderate facet arthropathy. No spinal canal or foraminal stenosis.     L5-S1: No disc without bulge or protrusion. Facet joints are normal. No spinal canal or foraminal stenosis.                                                                      IMPRESSION:  1.  Extensive osseous  metastases as well as chronic pathologic fractures at L1, L3, and L4, as detailed.  2.  Partially visualized asymmetric signal abnormality in the left sacral ala may represent confluent pathologic marrow replacement. Marrow edema related to sacral fracture is difficult to exclude in the appropriate clinical context.  3.  Posterior paraspinous muscular edema can be compatible with posttraumatic injury/strain.  4.  Multilevel degenerative changes, as detailed.        7/6/2023.  MRI brain  IMPRESSION:  1.  No acute ischemia, mass/mass effect, or abnormal intracranial enhancement.   2.  Similar sequela from demyelinating disease.  3.  Osseous metastases, as before.      Echocardiogram 4/20/2023  Left ventricular size, wall motion and function are normal. The ejection  fraction is 60-65%.  Global right ventricular function is normal.  No hemodynamically significant valve abnormalities.  The inferior vena cava is normal.  No pericardial effusion.     This study was compared with the study from 4/7/22. No significant change.      3/27/2023.  PET/CT  1. No new suspicious FDG uptake within the skeleton.  2. Stable numerous non-FDG avid lytic and sclerotic osseous lesions  throughout the axial skeleton.  3. Stable nondisplaced fracture of the left acromion/glenoid, chronic  bilateral healing rib fractures, and compression deformities of the  thoracic and lumbar spine.  4. Hypermetabolic focus in the expected location of the AV node versus  mitral valve, this may represent a normal AV node versus mitral valve  pathology such as vegetations. Recommend follow-up with cardiac  Ultrasound.        11/30/2022-x-ray of the ribs of the left side  There are a few sclerotic lesion seen in the right anterior ribs that were seen on previous PET CTs. These appear to involve the fourth and fifth ribs. There are likely more subtle lesions that were   described on the PET CT scan. Irregularity of the distal end of the left ninth rib may be  from metastatic disease or fracture on the oblique view which likely is chronic.       10/18/2022-PET/CT showed no new suspicious FDG uptake within the skeleton.  Stable abnormal FDG avid lytic and sclerotic osseous lesions.  Mild diffuse FDG uptake throughout the large bowel without CT abnormality.    7/1/2022.  PET/CT shows diffuse sclerotic and lytic osseous lesions without any abnormal FDG uptake in the skeleton, consistent with treated disease.  There is evaluation of previous hypermetabolic bone lesions.  Multiple chronic rib fracture deformities and stable compression fracture deformities of T6, L1 and L4.  No suspicious FDG uptake in the chest abdomen or pelvis.    3/31/2022-PET/CT shows progressive bone metastasis with progressive FDG uptake in the following lesions. Left iliac crest lesion with max SUV of 7.2, previously 3.5. Right mid iliac crest lytic lesion shows maximum SUV of 7.8, previously 6.9.  T10 vertebral body mixed lytic and sclerotic lesion with an SUV of 7.1, previously not hypermetabolic. Thoracic vertebral bodies 8, 7, 5, and 4 also show hypermetabolic lesions were previously there was no hypermetabolism.  Some of the sclerotic osseous lesions are unchanged and do not show increased hypermetabolism compatible treated lesion such as a severe compression deformity at L1 as well as superior compression deformity at L4.  Medial right clavicle sclerotic lesion with SUV max of 4.8, previously not hypermetabolic. There is also right-sided sternal lesions.      Biopsy from the right acetabulum shows metastatic lobular carcinoma.  It is triple negative.  Androgen receptor was diffusely positive (90%, moderate intensity) by immunohistochemistry. )  PD-L1 negative.    Foundation one showed CDH1 mutation (initially lobular cancer), CCND1 amplification ( equivocal ). FGF3, FGF4, FGF19 amplification ( Equivocal ). Most promising is CCND1 amplification with abemaciclib showing response in 4/10 patients.  FGF3,FGF4 and FGF19 are targetable with pan-FGFR inhibitors.           ASSESSMENT AND PLAN:     Metastatic breast cancer negative breast cancer (androgen receptor positive ) with extensive involvement of the bones.  There is also a left lower lobe hypermetabolic lesion and mediastinal lymph nodes which are hypermetabolic.  The biopsy from the right iliac bone is consistent with metastatic lobular breast cancer but it is hormone receptor and HER-2 negative and PDL 1 is also negative.    Foundation 1 testing did not reveal any actionable mutations.    She was intolerant to Xeloda and progressed on Taxol and eribulin.      We then switched to recently FDA approved sacituzumab govitecan-hziy (Trodelvy) for TNBC, based on the results of the phase II IMMU-132-01 clinical trial.   She started this on 11/11/2020.      She obtained a second opinion from Dr. Castillo from [facility] who recommended a repeat biopsy to be done to make sure that she really has triple negative breast cancer.      She also met with Dr. Arelis Arshad on 3/18/2021.      After 10 cycles of Trodelvy, she had PET/CT on 6/11/2021 which showed mildly increased uptake in some of the bone lesions while stability in other bone lesions.        After 15 cycles, repeat PET scan showed stable findings.  CA 27-29 has been increasing and it is 1176.      As she was tolerating the chemotherapy well and her quality of life has been decent and scans were stable although she had a rising CA 27-29, we decided on continuing the same chemotherapy because it has been a very slow progression of the disease.    Then she had clear progression of the disease in the bones with increased FDG uptake in both right and left iliac bones and the sternum.    Foundation one showed CDH1 mutation (initially lobular cancer), CCND1 amplification ( equivocal ). FGF3, FGF4, FGF19 amplification ( Equivocal ). Most promising is CCND1 amplification with abemaciclib showing response  in 4/10 patients. FGF3,FGF4 and FGF19 are targetable with pan-FGFR inhibitor    As the tumor is diffusely positive for androgen receptor, we decided to start her on androgen receptor blockers.    I initially recommended enzalutamide 160 mg daily ( Enzalutamide for the Treatment of Androgen Receptor-Expressing Triple-Negative Breast Cancer----J Clin Oncol. 2018 Mar 20; 36(9): 884-890. ).    Eventually we decided to start her on Casodex 150 mg daily instead of Enzalutamide due to cost issues.  Clinical Trial Clin Cancer Res. 2013 Oct 1;19(19):5505-12.   Phase II trial of bicalutamide in patients with androgen receptor-positive, estrogen receptor-negative metastatic Breast Cancer  She started Casodex on 1/6/2022.    She had progressive disease in the bones on the PET scan on 3/31/2022.    We changed to single agent Doxil on 4/14/2022.      She developed significant nausea vomiting and headache so cycle #2 was given on 5/13/2022 with 20% dose reduction which she tolerated well.    Cycle #3 was given on 6/10/2022 with the same dose reduced Doxil.    Repeat PET/CT on 7/1/2022 shows resolution of the FDG avid bony metastasis consistent with treated disease.  No suspicious FDG uptake was seen in the chest abdomen and pelvis.  CA 27-29 was also coming down.     Repeat PET/CT in October 2022 after completing 7 cycles continued to show normal FDG uptake.  CA 15-3 was trending down     Cycle #11 was delayed by 1 week because of chemotherapy-induced neutropenia.  It was received on 2/3/2023.      She received cycle #12 on 3/3/2023 with Onpro.    Repeat PET/CT on 3/27/2023 overall showed very stable findings.    Cycle 13 was given on 3/31/2023 with Onpro support.      Cycle #14 was on 4/28/2023.   CA 15-3 was stable at 115.     CA 15-3 has been slowly rising.    Repeat PET/CT on 8/8/2023 showed pretty stable findings.    She has been having more pain in the lumbar spine/low back.    Otherwise tolerating chemotherapy well.    We  discussed the situation in detail.  It seems that cancer has started to progress very slowly based on the rising CA 15-3 but PET scan remains a stable.  Potentially we can continue with Doxil considering that it is keeping the cancer slowed down and she is tolerating this well.  On the other hand we can switch to another chemotherapy like carboplatin.  We discussed pros and cons of each approach and at this time decided to continue with Doxil and repeat PET/CT in 2 to 3 months.  We will continue to monitor CA 15-3 serially.    Chemotherapy-induced neutropenia.  Continue Onpro because of chemotherapy related neutropenia.        Bone disease.  She has metastatic disease to the bone.  Previously she got palliative radiation to T12-L5 3000 cGy in 10 fractions from 9/6/2019 till 9/18/2019.  She was evaluated by orthopedics Dr. Bipin Elliott on 11/1/2019 and conservative management was recommended.    She was on Zometa every 3 months. She last received on 9/29/2020.  Because of progression of the disease in the bones, we switched to Xgeva which she received on 11/11/2020.    She had progression of the disease in the bones so we switched to Doxil but we continued xgeva.  PET scan on 7/1/2022 does not show any suspicious FDG uptake in the bones consistent with treated disease.  Repeat PET/CT in March 2023 was without any active FDG avid lesions.  PET/CT on 8/8/2023 overall showed pretty stable findings.  There has been slight increase in the FDG activity in a few places but without CT evidence of progression so it is indeterminate whether this represent technical factors versus a very early recurrence of active disease.  As mentioned above, we will continue with the same chemotherapy.  She will continue to get Xgeva and she will take calcium and vitamin D.    Cancer related pain.  Continue buprenorphine buccal film.  I advised her to take morphine as needed for the pain as this helps her.  I also prescribed Flexeril.   She will follow with Dr. Whitaker.      Migraines.  Doing much better with Emgality.  She takes as needed Ubrelvy which helps.  She is following with neurology.      Bilateral pulmonary emboli.  Continue Eliquis.      We did not address the following today.    Shingles.  The rash has dried out.  She completed well with acyclovir and prednisone.  She takes gabapentin for postherpetic neuralgia and is doing better.        Constipation. Continue senna.    Neuropathy.  This remains mild and stable with neuropathy in her hands.    This is in addition to the chronic balance issues and heat and cold sensitivity from underlying multiple sclerosis which is also stable for years.  Continue to monitor.     Subcutaneous nodule in the scalp.  This looks like an epidermoid cyst.  She thinks it is a stable.  Continue to monitor.       Insomnia.  Continue trazodone.      Wall thickening of esophagus and FDG uptake of fundus of the stomach.  It could be in the setting of inflammation from recent nausea and vomiting.  Symptoms have resolved.  Continue to monitor clinically.    Vision changes.    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  It was thought that this could be due to Taxol as patient reported to the ophthalmologist that she was on Taxol in September 2019 when her symptoms started.  But she was not on Taxol in September 2019 when she started noticing the visual changes so Taxol is not responsible for this.  The first dose of Taxol was on 11/5/2019.   She had left cataract extraction on 2/8/2021 and she has noticed significant improvement in her vision.  Thinks that her vision is back to her usual.      Increased FDG ability in the colon.  She had FDG uptake in the cecum and ascending colon but no corresponding lesion was seen on the CT scan.  She is completely asymptomatic so at this time we will continue to observe.    Discussion regarding healthcare directives.  On previous visit she  told me that she will bring the health care directive for our records but she forgot so I told her to bring it on next visit.      Breast implant removal.  She tells me that she was planning to do breast implant removal because there was a recall on this.  Her surgery was scheduled for 9/24/2019.  Because of this new development of metastatic breast cancer and her recent radiation, surgery has been canceled.  We discussed that at this time treatment for metastatic breast cancer would take precedence over the other surgery as the other surgery would require her to be off chemotherapy for several weeks and in that case the chances of cancer progression would be high and I would not recommend that.    Multiple sclerosis.  She will continue to follow with her neurologist Dr. Quiles.  Currently multiple sclerosis is under control and currently she is not taking any medications.    Return to clinic in 5 weeks    All of her questions were answered to her satisfaction.  She is agreeable and comfortable with the plan.    Dewayne Zepeda MD    I spent >43 minutes on this visit on the date of service, including the video time, reviewing records and labs and imaging and placing new orders as well as coordination of care and documentation.

## 2023-08-10 NOTE — NURSING NOTE
Is the patient currently in the state of [place]? YES    Visit mode:VIDEO    If the visit is dropped, the patient can be reconnected by: VIDEO VISIT: Send to e-mail at: [email or web address]    Will anyone else be joining the visit? NO      How would you like to obtain your AVS? MyChart    Are changes needed to the allergy or medication list? NO    Reason for visit: Video Visit (Follow Up )       Yesenia Geller

## 2023-08-10 NOTE — PROGRESS NOTES
ONCOLOGY FOLLOW UP NOTE: 8/10/23        I took the history from reviewing the previous notes that she was following with Dr. Amaya.  I have copied and updated from prior notes.    ONCOLOGY History:    1.  January 2006:  Diagnosed with Stage IIB, T2 N1 M0 invasive lobular carcinoma of the right breast.  Final pathology showed a 4.5 x 3.8 x 2.5 cm, 01/14 lymph nodes positive.  Estrogen, progesterone receptor positive, HER-2 negative.     2.  Genetics.  BRCA1 and 2 mutations not detected. 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       THERAPY TO DATE:   1.  2006:  ECOG 2104 protocol of dose-dense Adriamycin, Cytoxan and Avastin x4 cycles followed by Taxol and Avastin x4 cycles.   2.  03/2007:  Completed 1 year Avastin.   3.  11/2006-01/2007:  Radiotherapy to the right breast of 5040 cGy.   4.  03/[id number]:  Aromasin.  Stopped after moving to the [facility].   5.  01/2016:  Right modified radical mastectomy with latissimus dorsi flap reconstruction and a left prophylactic mastectomy with latissimus dorsi flap reconstruction.     She recently had MRI of the brain as a follow-up for multiple sclerosis and there were multiple calvarial lesions noted which was suspicious for metastatic disease.  There was no evidence of new multiple sclerosis lesions.  She had a PET scan on 8/30/2019 which showed widespread bone metastatic lesions (calvarium, spine, sacrum, pelvis, ribs most prominent at C7, T3, L1 and L4), hypermetabolic 3 cm lesion in LLL, and mediastinal/hilar LN. CEA wnl at 1.9 and C27-29 elevated to 415 (28 on 8/23/16). A CT guided biopsy of the right iliac bone was obtained on 9/4/19, pathology consistent with metastatic adenocarcinoma (breast), ER/NM negative, HER-2 negative PDL 1 < 1%. A second biopsy was take of the LLL nodule via EBUS on 9/5/19 did not show any malignancy.    C/T/L spine MRI 8/3/19 to 9/2/19 with numerous enhancing lesions of the C, T and L spine, largest around T9 and L1. No evidence of  cord compression. Has a L1 compression fracture and impending L4.     Received radiation T12-L5 3000 cGy in 10 fractions from 9/6/2019 till 9/18/2019.  Neurosurgery recommended no surgical intervention but to wear Gorge brace when out of bed and HOB >30 degrees    She started palliative Xeloda 2000/1500 on 10/1/2019 but after just a few doses she noticed nausea, red eyes blurred vision, loss of appetite.  She stopped taking it after 5 doses and was seen by nurse practitioner on 10/7/2019 when she was improving.  At that point she was restarted on Xeloda at a lower dose of 1000 mg twice a day.  As she was not able to tolerate even the lower dose with extreme nausea and vomiting and feeling extremely fatigued and blurry vision, we decided on stopping Xeloda.    We decided on repeating scans and MRI brain on 10/25/2019 showed no intracranial metastatic disease.   Multiple enhancing calvarial lesions may be increased in number and are suggestive of metastatic disease. Thinner imaging on the current study may identify the smaller lesions and may be responsible for  identified more lesions.   There is a single focus of T2 hyperintense signal within the anterior right temporal lobe may represent interval demyelination. There is no evidence for active demyelination.    PET/CT on 11/1/2019 showed favorable response to therapy and Overall FDG uptake within scattered osseous metastases is decreased since 8/30/2019, particularly within the pelvis. Increased sclerotic appearance of L1 and L4 pathologic compression deformities, likely sequela of recently completed palliative radiation therapy.    No significant FDG uptake in previously demonstrated hypermetabolic mediastinal lymph nodes. Biopsy negative on 9/5/2019.  There is also decreased FDG uptake in the left lower lobe (biopsy negative for  malignancy), now with dense consolidation containing air bronchograms suggestive of infection versus aspiration.  There is diffuse FDG  uptake within the esophagus, consistent with esophagitis.    Because of intolerance to Xeloda we decided on switching to weekly paclitaxel on 11/5/2019.    C#2 Taxol 12/4/19- started with 70mg/m2 dose reduction    C#3 Taxol 12/30/2019     PET/CT on 1/24/2020 showed progression of the disease in some of the bone lesions including increase in size and lytic lesion within the vertebral body of C4 measuring 1 cm as opposed to 0.6 cm previously and a new central soft tissue nodule with SUV max 4.1 when previously it was non-hypermetabolic.  There is also some progression noted in the right proximal femur and right iliac bone.  There is decreased metabolic uptake in the right lamina of T9 with SUV max 4.7 when previously it was 8.0.  Other lesions are stable.    CA 27-29 also had increased significantly and it was 257 on 1/28/2020.    We decided on switching to eribulin on 1/28/2020.    C#2 2/18/2020  C#2 D#8 2/25/2020    C#3 D#1 3/18/2020 -delayed by 1 week as per patient's preference because she wanted to visit her family.    She had a repeat PET/CT on 3/27/2020 which showed stable size of the bone metastatic lesions although the FDG avidity was less, consistent with treatment response.    She had MRI of the thoracic spine on 4/3/2020 and it was compared to the one which was done in August 2019 and it showed increased size of several metastatic lesions most notably at T9 but also at T5-T7.  Other lesions at T10, T11 and T12 appeared improved.    CA 27-29 was also down to 222 on 3/18/2020.    Cycle #4 eribulin ( dose reduced to 1.2 mg/m2 )-4/7/2020-   Cycle #5 Eribulin ( 1.2 mg/m2 )- 4/28/2020   Cycle #6 Eribulin ( 1.2 mg/m2 )- 5/19/2020     Cycle #7 Eribulin ( 1.2 mg/m2 )- 6/9/2020    Cycle #8 Eribulin ( 1.2 mg/m2 )- 6/30/2020     She had a repeat PET scan on 7/17/2020 which showed incidental finding of new acute bilateral pulmonary embolism in the distal left main pulmonary artery extending in the left upper and lower  lobes and in the segmental and branch arteries in the right lower lobe.    It also showed mildly increased FDG avidity in the lytic lesion in the T9 lamina and right pedicle with SUV max 5.3, previously 3.9.  That is also slightly increased FDG uptake to the left anterior fifth rib at the costochondral junction SUV max 3.9, previously 2.5.  There is slightly decreased FDG uptake in the right femoral neck lesion SUV max 2.2, previously 2.9.  FDG uptake in other bone lesions is fairly stable.  No new metastasis are seen.    CA 27-29 was 308 on 6/30/2020.  It was 286 on 5/19/2020.    Overall this is consistent with only slight progression versus stable disease.    She was started on Lovenox.    Cycle #9 eribulin 7/21/2020. CA 27-29 was 238    C#10 eribulin 8/18/2020. ( delayed by one week for ANC 0.9 )    C#11 Eribulin 9/8/2020    C#12 Eribulin 9/29/2020     C#13 Eribulin 10/20/2020     PET scan on 11/6/2020 shows progression of the disease with increased FDG uptake in the lytic lesions in the T9/T10 and right posterolateral elements as well as increased FDG uptake in the previously known hypermetabolic right iliac bone lesion suggesting recurrence of previously treated metastasis.  There is new subtle lesion in the right manubrium.  There is also a new fracture in the anterolateral left fourth rib with associated uptake and this could be a pathologic fracture.  Healing fracture in the left anterior fifth rib lesion.  There is resolution of the left pulmonary emboli.  Decreased FDG uptake of the esophagus consistent with improving inflammation.    CA 27-29 on 10/20/2020 was also elevated at 489.    C#1 Trodelvy on 11/11/2020.  Cycle #2 Trodelvy 12/2/2020  Cycle number 2-day #8 Trodelvy 12/8/2020.    12/15/2020-12/16/2020.  She was admitted to the hospital with nausea vomiting and dehydration.  She had tachycardia and initial lactic acid of 5.  It decreased to 1.4 after 2 L of normal saline.  She also had hypokalemia  and ANC was 1.3.  She was treated with IV fluids.  Procalcitonin was negative.  CTA of the chest was negative for pulmonary embolism or pneumonia.  No bacterial infection was documented.  Her medication which she was initially unable to tolerate at home, were restarted and she was discharged home once started to feel better.      We delayed the start of cycle number 3 x 1 week and decrease the dose of chemotherapy by 25%.  She also had neutropenia with ANC of 1.0.      She started cycle number 3-day #1 on 12/29/2020.  CA 27-29 was 392.    Cycle number 3-day #8 1/5/2021.    C#4 Trodelvy 1/20/2021- 75% dose    2/5/2021.  PET/CT overall shows a good response to treatment with improvement of previously hypermetabolic lesions in the spine and pelvis and stability of other bone meta stasis.  There is a single lytic lesion in the right iliac bone which demonstrates slight Yimi increased FDG uptake and has SUV max 4.7 while previously it was SUV max 3.4.  There was diffuse wall thickening of the esophagus with uptake in the esophagus in the fundus of the stomach and this could be seen with inflammation.    Trodelvy cycle #5 - 2/10/2021.  75% of the dose.  CA 27-29 was 337.    Trodelvy cycle #6 - 3/3/2021.  75% of the dose.  Trodelvy cycle #6, D#8 - 3/10/2021.  75% of the dose.    Trodelvy C#8, D#8 on 4/21/2021  CA 27-29 stable at 371 on 4/14/2021    Trodelvy, cycle #9- 5/5/2021        On 2/25/2020 when she had biopsy of the right acetabulum and it was consistent with metastatic lobular carcinoma.  Again it was Triple Negative.     On 3/18/2020 when she also met with Dr. Arelis Arshad who also advised testing for androgen receptor studies on the biopsy specimen.  Tumor was diffusely androgen receptor positive.      5/26/2021-cycle #10 Trodelvy     CA 27-29 was 545.    6/11/2021.  PET/CT shows mildly increased uptake in several bone lesions for example in the left anterior iliac crest, mid right iliac crest and left ischium.   Uptake in other bone lesions are similar to previously.    Because of minimal progression of the disease and she is tolerating chemotherapy very well, we decided on continuing the same chemotherapy.    6/16/2021-Trodelvy cycle #11    7/7/2021 -Trodelvy cycle #12    9/14/2021-Trodelvy-cycle #15, day #8.    9/8/2021-CA 27-29 was more elevated and it was 1176.    PET/CT on 9/24/2021 showed stable findings with no evidence of FDG avid metastatic disease within the body.  Left axillary lymph nodes are prominent and hypermetabolic and likely from recent vaccinations in the left deltoid muscle.  Healed bony metastasis seems stable.      Cycle #16, Trodelvy 9/28/2021.  Cycle #17, day #8 Trodelvy was on 10/26/2021.  Cycle #18, day #8 Trodelvy on 11/22/2021       She saw Dr. Jackie jamison on 10/6/2021.  She was not considered eligible for Enfortumab trial.    Cycle #19, Trodelvy on 12/7/2021 12/21/2021.  PET/CT showed progression of bony metastatic disease.  There is increase hypermetabolism in both the right and left iliac bones and the sternum.  Other bone lesions are stable.    There is new scattered areas of groundglass throughout both the lungs and these findings are indeterminate but may represent an infectious etiology.  Interval resolution of left axillary FDG avid lymphadenopathy.    She was started on Casodex 150 mg daily on 1/6/2022.    She was admitted to the hospital from 3/17/2022-3/19/2022 for vomiting and diarrhea and  severe back pain.  I reviewed the discharge summary.  CT lumbar spine showed a stable severe chronic L1 pathologic compression fracture.  Stable mild compression deformity of superior endplate of L3 and superior endplate of L4.  The upper margin of the L4 fracture extends slightly into the spinal canal without high-grade canal stenosis and this is also stable.  Nondisplaced fractures coursing through the L3 pedicles bilaterally were seen which were not clearly seen previously this could be  acute and likely pathologic.  No extraspinal soft tissue abnormality.  Neurosurgery recommended conservative management.  Her pain was controlled and she was discharged home as she felt better with this.      3/31/2022-PET/CT shows progressive bone meta stasis with progressive FDG uptake in the following lesions. Left iliac crest lesion with max SUV of 7.2, previously 3.5. Right mid iliac crest lytic lesion shows maximum SUV of 7.8, previously 6.9.  T10 vertebral body mixed lytic and sclerotic lesion with an SUV of 7.1, previously not hypermetabolic. Thoracic vertebral bodies 8, 7, 5, and 4 also show hypermetabolic lesions were previously there was no hypermetabolism.  Some of the sclerotic osseous lesions are unchanged and do not show increased hypermetabolism compatible treated lesion such as a severe compression deformity at L1 as well as superior compression deformity at L4.  Medial right clavicle sclerotic lesion with SUV max of 4.8, previously not hypermetabolic. There is also right-sided sternal lesions.      4/14/2022--C#1- switch to single agent Doxil 50 mg per metered squared every 4 weeks.    5/13/2022-cycle #2- Doxil- dose reduced to 40 mg per metered square due to severe nausea/vomiting and migraines requiring IV fluids and IV antiemetics.    6/10/2022-cycle #3-Doxil 40 mg per metered square    7/1/2022- PET/CT shows resolution of the hypermetabolic skeletal metastasis.    7/6/2022-cycle #4-Doxil 40 mg per metered square  8/5/2022- cycle #5-Doxil  9/1/2022.  Cycle #6-Doxil  9/30/2022- cycle #7-Doxil    10/18/2022.  PET/CT does not show evidence of any FDG activity    11/2/2022-cycle #8-Doxil.  11/30/2022-cycle #9-Doxil.  12/28/2022-cycle #10-Doxil    2/3/2023- C#11- Doxil (delayed by 1 week because neutrophil count was 0.9)-wanted to give her Onpro but at that time it was not approved by insurance.    3/3/2023.  Cycle #12 Doxil.  Given with Onpro.    Repeat PET/CT on 3/27/2023 overall showed very stable  findings.  PET/CT showed focal FDG avidity in the heart. This is in an expected location of AV node or mitral valve.  This is nonspecific.  We checked an echocardiogram on 4/20/2023 which was unremarkable.  She is asymptomatic.  Continue to monitor clinically.      3/31/2023.  Cycle #13 Doxil.  Given with Onpro.     4/12/2023-she presented to ED with increased migraine headaches and nausea and vomiting.  She was treated symptomatically and was discharged home after she started to feel better.      4/28/2023.  Cycle #14 Doxil.  Given with Onpro    Cycle #16 Doxil with Onpro 6/23/2023    Cycle #17 Doxil with Onpro 7/21/2023      Interval history.  This is a video visit.    As she was having more lumbar/low back pain for which she got admitted.  MRI of the lumbar spine showed known bone metastasis and known fractures.  Flexeril helped.  She continues to be on buprenorphine.  She has not taken additional morphine but tells me that when she takes it, it helps with the pain.  She is also not taking Flexeril because she ran out of that.  She feels somewhat fatigued.  Otherwise tolerating chemotherapy well with off-and-on nausea.  Migraines are better.  No diarrhea.  No shortness of breath or infections.  No new swellings.  Neuropathy is the same as before.  She is taking vitamin D and calcium.        ECOG 1    ROS:  Rest of the comprehensive review of the system was negative.        I reviewed other history in epic as below.       PAST MEDICAL HISTORY:     1.  Breast cancer  2.  Multiple sclerosis.   3.  Depression.   4.  Migraines.   5.  Hypertension.   6.  Cholecystectomy and umbilical hernia repair.     SOCIAL HISTORY: She smoked for 5 years but quit many years ago.  Drinks alcohol socially.  She lives with her .  She is a teacher in middle school.       FAMILY HISTORY: She mentions that her mother had breast cancer at 49.  Both grandmothers had breast cancer but she is not sure of the age.  A couple of cousins  "have cancers.  Patient has 3 children who are healthy.    Current Outpatient Medications   Medication    apixaban ANTICOAGULANT (ELIQUIS ANTICOAGULANT) 5 MG tablet    Buprenorphine HCl (BELBUCA) 300 MCG FILM buccal film    busPIRone (BUSPAR) 15 MG tablet    calcium citrate-vitamin D (CITRACAL) 315-250 MG-UNIT TABS    Cholecalciferol (VITAMIN D3 PO)    cyclobenzaprine (FLEXERIL) 5 MG tablet    galcanezumab-gnlm (EMGALITY) 120 MG/ML injection    LORazepam (ATIVAN) 0.5 MG tablet    metoclopramide (REGLAN) 5 MG tablet    morphine (MSIR) 15 MG IR tablet    OLANZapine (ZYPREXA) 5 MG tablet    ondansetron (ZOFRAN ODT) 4 MG ODT tab    propranolol ER (INDERAL LA) 80 MG 24 hr capsule    traZODone (DESYREL) 50 MG tablet    ubrogepant (UBRELVY) 100 MG tablet    venlafaxine (EFFEXOR XR) 75 MG 24 hr capsule    vitamin B-2 (RIBOFLAVIN) 25 MG TABS tablet    Zavegepant HCl 10 MG/ACT SOLN    ketorolac (TORADOL) 15 MG/ML injection    levofloxacin (LEVAQUIN) 500 MG tablet    Needle, Disp, 27G X 1/2\" MISC     No current facility-administered medications for this visit.     Facility-Administered Medications Ordered in Other Visits   Medication    heparin 100 unit/mL injection 5 mL    sodium chloride (PF) 0.9% PF flush 10 mL        Allergies   Allergen Reactions    Bactrim [Sulfamethoxazole W/Trimethoprim]      Internal bleeding    Copaxone [Glatiramer] Hives          PHYSICAL EXAMINATION:     Ht 1.575 m (5' 2\")   Wt 56.7 kg (125 lb)   BMI 22.86 kg/m    Wt Readings from Last 4 Encounters:   08/10/23 56.7 kg (125 lb)   07/31/23 58.1 kg (128 lb 1.4 oz)   07/27/23 55.8 kg (123 lb)   07/21/23 59.2 kg (130 lb 9.6 oz)         Constitutional.  Looks well and in no apparent distress.   Eyes.  Without eye redness or apparent jaundice.   Respiratory.  Non labored breathing. Speaking in full sentences.    Skin.  No concerning skin rashes on the skin visualized.   Neurological.  Is alert and oriented.  Psychiatric.  Mood and affect seem " appropriate.      The rest of a comprehensive physical examination is deferred due to [facility] video visit restrictions.          Labs and Imaging.    Reviewed.    8/1/2023  CBC shows WBC 7.6.  Hemoglobin 11.1.  Platelets 110.  Chemistry was unremarkable.    7/21/2023.    CA 15-3 was 186.    7/3/2023    CA 15-3 was 177 on 6/23/2023      CA 15-3 was 129 on 3/31/2023      CA 15-3 was 123 on 3/3/2023.      11/30/2022     CA 15-3 was 135.    9/30/2022  CA 15-3 was 159.  CA 27-29 was 1992 on 6/10/2022  CA 27-29 was 3370 on 5/13/2022.  It was 5307 on 4/14/2022 ferritin Doxil was started.      9/28/2021.      Iron studies, ferritin is 101, iron 51, TIBC 268 and iron saturation index 19%.    PET/CT on 8/8/2023  COMPARISON: PET/CT 3/27/2023, CT cervical, thoracic, and lumbar spine  7/31/2023     FINDINGS:      HEAD/NECK:  There is no suspicious FDG uptake in the neck.  The paranasal sinuses are clear. The mastoid air cells are clear.  Unchanged subcutaneous soft tissue nodule in the occipital scalp  without suspicious activity. No suspicious activity in the brain  parenchyma.     No hypermetabolic lymph nodes are demonstrated in the neck.      The mucosal and deep spaces of the neck are unremarkable. The major  salivary glands are unremarkable. The thyroid is unremarkable. The  major vasculature of the neck are patent.     CHEST:  There is no suspicious FDG uptake in the chest. Mildly increased  uptake of the esophagus, most prominent at the distal esophagus.     The central tracheobronchial tree is clear. No pleural effusion or  pneumothorax. No acute consolidation. 6 x 1 mm thickening at the left  major fissure without uptake which has been present on prior exams,  likely intrafissural lymph node versus scarring. No suspicious  pulmonary nodules. Dependent atelectasis.     No hypermetabolic lymph nodes are demonstrated in the chest. Partially  calcified mediastinal and hilar lymph nodes.     Heart size is  within normal limits. No pericardial effusion. The  thoracic aorta and main pulmonary artery are within normal limits for  diameter. The esophagus is unremarkable. Left chest wall Port-A-Cath  terminates at the cavoatrial junction. Postoperative changes of  bilateral mastectomies and breast implants.     ABDOMEN AND PELVIS:  There is no suspicious FDG uptake in the abdomen or pelvis.     The liver is unremarkable. Cholecystectomy. Unchanged dilated common  bile duct likely related to prior cholecystectomy. Atrophic pancreas.  The spleen is unremarkable. The adrenal glands are unremarkable.     Symmetric enhancement of the kidneys. No hydronephrosis. Tiny right  renal cortical hypodensity which was present on 6/11/2021. The urinary  bladder is nondistended. Unchanged calcification in the uterus, likely  fibroid.     No hypermetabolic lymph nodes are demonstrated in the abdomen or  pelvis.     Normal caliber of the small and large bowel. Normal appendix. No free  air, free fluid, or fluid collection. Normal caliber of the abdominal  aorta.     BONES:   Multiple lytic and sclerotic osseous lesions throughout the axial and  proximal appendicular skeleton, some with mild uptake increased  compared to 3/27/2023. For example, a lytic lesion of the T8 vertebral  body demonstrates SUV max of 4.5 (previously 3.5) and a sclerotic and  lytic lesion of the right pelvis superior to the acetabulum  demonstrating SUV max of 3.9, which was previously below background  levels.     Unchanged superior L1 compression fracture and mild L4 compression  deformity. Multiple bilateral chronic rib fracture deformities which  are unchanged. Unchanged chronic fracture of the left glenoid. No  significant spinal canal stenosis or spinal canal or spinal canal  involvement of metastasis.                                                                      IMPRESSION: In this 63-year-old woman with breast cancer with multiple  osseous metastases  status post mastectomies, radiation, and  chemotherapy;  1.  Overall, the exam is felt to be stable with multiple lytic and  sclerotic lesions throughout the axial and proximal appendicular  skeleton not significantly changed, representing treated disease. Some  of the lesions demonstrate minimally increased uptake without clear  progression on CT, indeterminate if this represents technical factors  versus very early metastatic disease. No new osseous lesions.  Attention on follow-up.  2.  Stable severe chronic L1 compression fracture.  3.  Esophagitis, unchanged compared to 3/27/2023.    MRI of the lumbar spine on 7/31/2023  COMPARISON: PET/CT 03/27/2023.  TECHNIQUE: MRI Lumbar Spine without IV contrast.     FINDINGS: Examination is mildly limited by patient motion artifact.     Alignment is unchanged. Redemonstrated heterogeneous bone marrow signal throughout the visualized lumbosacral spine including numerous focal abnormal T1 hypointense and STIR hyperintense marrow replacing lesions, including confluent lesions at T12 and S2   vertebrae. Similar chronic L1 pathologic burst fracture with mild superior endplate retropulsion and severe anterior-central vertebral body height loss. Likewise, similar chronic L4 superior endplate fracture with associated mild retropulsion. Large   Schmorl's node at the L2 inferior endplate, as before. Vertical defects in the bilateral L3 pedicles are redemonstrated compatible with chronic pathologic fractures. Asymmetric STIR hyperintense signal abnormality in the left sacral ala partially   visualized, which may represent additional confluent pathologic marrow replacement. Otherwise, remaining lumbar vertebral body heights are maintained. Conus signal is normal and terminates at L2. No perivertebral soft tissue edema. There is mild edema   within the bilateral multifidus and erector spinae muscles, which may indicate posttraumatic injury/strain. Superficial subcutaneous soft tissue  edema may be posttraumatic or related to positioning. Prominence of bile duct may be related to   postcholecystectomy physiologic ectasia. Small/subcentimeter right renal cyst. Last fully formed disc is designated L5-S1. Multilevel disc degeneration and height loss, severe at L5-S1.     T12-L1: No disc without bulge or protrusion. Facet joints are normal. Mild L1 superior endplate retropulsion indents the ventral thecal sac without spinal canal stenosis. No foraminal stenosis.     L1-L2: No disc without bulge or protrusion. Facet joints are normal. No spinal canal or foraminal stenosis.     L2-L3: Bilobed left asymmetric diffuse disc bulge. Mild facet arthropathy with ligamentum flavum thickening. Mild left foraminal stenosis. No right foraminal or spinal canal stenosis.     L3-L4: Bilobed left asymmetric diffuse disc bulge. Mild facet arthropathy with ligamentum flavum thickening. In conjunction with superior endplate retropulsion, there is borderline mild central spinal canal stenosis and mild left subarticular recess   narrowing. Mild right and moderate left foraminal stenosis.     L4-L5: Small left foraminal disc protrusion. Moderate facet arthropathy. No spinal canal or foraminal stenosis.     L5-S1: No disc without bulge or protrusion. Facet joints are normal. No spinal canal or foraminal stenosis.                                                                      IMPRESSION:  1.  Extensive osseous metastases as well as chronic pathologic fractures at L1, L3, and L4, as detailed.  2.  Partially visualized asymmetric signal abnormality in the left sacral ala may represent confluent pathologic marrow replacement. Marrow edema related to sacral fracture is difficult to exclude in the appropriate clinical context.  3.  Posterior paraspinous muscular edema can be compatible with posttraumatic injury/strain.  4.  Multilevel degenerative changes, as detailed.        7/6/2023.  MRI brain  IMPRESSION:  1.  No acute  ischemia, mass/mass effect, or abnormal intracranial enhancement.   2.  Similar sequela from demyelinating disease.  3.  Osseous metastases, as before.      Echocardiogram 4/20/2023  Left ventricular size, wall motion and function are normal. The ejection  fraction is 60-65%.  Global right ventricular function is normal.  No hemodynamically significant valve abnormalities.  The inferior vena cava is normal.  No pericardial effusion.     This study was compared with the study from 4/7/22. No significant change.      3/27/2023.  PET/CT  1. No new suspicious FDG uptake within the skeleton.  2. Stable numerous non-FDG avid lytic and sclerotic osseous lesions  throughout the axial skeleton.  3. Stable nondisplaced fracture of the left acromion/glenoid, chronic  bilateral healing rib fractures, and compression deformities of the  thoracic and lumbar spine.  4. Hypermetabolic focus in the expected location of the AV node versus  mitral valve, this may represent a normal AV node versus mitral valve  pathology such as vegetations. Recommend follow-up with cardiac  Ultrasound.        11/30/2022-x-ray of the ribs of the left side  There are a few sclerotic lesion seen in the right anterior ribs that were seen on previous PET CTs. These appear to involve the fourth and fifth ribs. There are likely more subtle lesions that were   described on the PET CT scan. Irregularity of the distal end of the left ninth rib may be from metastatic disease or fracture on the oblique view which likely is chronic.       10/18/2022-PET/CT showed no new suspicious FDG uptake within the skeleton.  Stable abnormal FDG avid lytic and sclerotic osseous lesions.  Mild diffuse FDG uptake throughout the large bowel without CT abnormality.    7/1/2022.  PET/CT shows diffuse sclerotic and lytic osseous lesions without any abnormal FDG uptake in the skeleton, consistent with treated disease.  There is evaluation of previous hypermetabolic bone lesions.   Multiple chronic rib fracture deformities and stable compression fracture deformities of T6, L1 and L4.  No suspicious FDG uptake in the chest abdomen or pelvis.    3/31/2022-PET/CT shows progressive bone metastasis with progressive FDG uptake in the following lesions. Left iliac crest lesion with max SUV of 7.2, previously 3.5. Right mid iliac crest lytic lesion shows maximum SUV of 7.8, previously 6.9.  T10 vertebral body mixed lytic and sclerotic lesion with an SUV of 7.1, previously not hypermetabolic. Thoracic vertebral bodies 8, 7, 5, and 4 also show hypermetabolic lesions were previously there was no hypermetabolism.  Some of the sclerotic osseous lesions are unchanged and do not show increased hypermetabolism compatible treated lesion such as a severe compression deformity at L1 as well as superior compression deformity at L4.  Medial right clavicle sclerotic lesion with SUV max of 4.8, previously not hypermetabolic. There is also right-sided sternal lesions.      Biopsy from the right acetabulum shows metastatic lobular carcinoma.  It is triple negative.  Androgen receptor was diffusely positive (90%, moderate intensity) by immunohistochemistry. )  PD-L1 negative.    Foundation one showed CDH1 mutation (initially lobular cancer), CCND1 amplification ( equivocal ). FGF3, FGF4, FGF19 amplification ( Equivocal ). Most promising is CCND1 amplification with abemaciclib showing response in 4/10 patients. FGF3,FGF4 and FGF19 are targetable with pan-FGFR inhibitors.           ASSESSMENT AND PLAN:     Metastatic breast cancer negative breast cancer (androgen receptor positive ) with extensive involvement of the bones.  There is also a left lower lobe hypermetabolic lesion and mediastinal lymph nodes which are hypermetabolic.  The biopsy from the right iliac bone is consistent with metastatic lobular breast cancer but it is hormone receptor and HER-2 negative and PDL 1 is also negative.    Foundation 1 testing did  not reveal any actionable mutations.    She was intolerant to Xeloda and progressed on Taxol and eribulin.      We then switched to recently FDA approved sacituzumab govitecan-hziy (Trodelvy) for TNBC, based on the results of the phase II IMMU-132-01 clinical trial.   She started this on 11/11/2020.      She obtained a second opinion from Dr. Castillo from [facility] who recommended a repeat biopsy to be done to make sure that she really has triple negative breast cancer.      She also met with Dr. Arelis Arshad on 3/18/2021.      After 10 cycles of Trodelvy, she had PET/CT on 6/11/2021 which showed mildly increased uptake in some of the bone lesions while stability in other bone lesions.        After 15 cycles, repeat PET scan showed stable findings.  CA 27-29 has been increasing and it is 1176.      As she was tolerating the chemotherapy well and her quality of life has been decent and scans were stable although she had a rising CA 27-29, we decided on continuing the same chemotherapy because it has been a very slow progression of the disease.    Then she had clear progression of the disease in the bones with increased FDG uptake in both right and left iliac bones and the sternum.    Foundation one showed CDH1 mutation (initially lobular cancer), CCND1 amplification ( equivocal ). FGF3, FGF4, FGF19 amplification ( Equivocal ). Most promising is CCND1 amplification with abemaciclib showing response in 4/10 patients. FGF3,FGF4 and FGF19 are targetable with pan-FGFR inhibitor    As the tumor is diffusely positive for androgen receptor, we decided to start her on androgen receptor blockers.    I initially recommended enzalutamide 160 mg daily ( Enzalutamide for the Treatment of Androgen Receptor-Expressing Triple-Negative Breast Cancer----J Clin Oncol. 2018 Mar 20; 36(9): 884-890. ).    Eventually we decided to start her on Casodex 150 mg daily instead of Enzalutamide due to cost issues.  Clinical Trial Clin  Cancer Res. 2013 Oct 1;19(19):5505-12.   Phase II trial of bicalutamide in patients with androgen receptor-positive, estrogen receptor-negative metastatic Breast Cancer  She started Casodex on 1/6/2022.    She had progressive disease in the bones on the PET scan on 3/31/2022.    We changed to single agent Doxil on 4/14/2022.      She developed significant nausea vomiting and headache so cycle #2 was given on 5/13/2022 with 20% dose reduction which she tolerated well.    Cycle #3 was given on 6/10/2022 with the same dose reduced Doxil.    Repeat PET/CT on 7/1/2022 shows resolution of the FDG avid bony metastasis consistent with treated disease.  No suspicious FDG uptake was seen in the chest abdomen and pelvis.  CA 27-29 was also coming down.     Repeat PET/CT in October 2022 after completing 7 cycles continued to show normal FDG uptake.  CA 15-3 was trending down     Cycle #11 was delayed by 1 week because of chemotherapy-induced neutropenia.  It was received on 2/3/2023.      She received cycle #12 on 3/3/2023 with Onpro.    Repeat PET/CT on 3/27/2023 overall showed very stable findings.    Cycle 13 was given on 3/31/2023 with Onpro support.      Cycle #14 was on 4/28/2023.   CA 15-3 was stable at 115.     CA 15-3 has been slowly rising.    Repeat PET/CT on 8/8/2023 showed pretty stable findings.    She has been having more pain in the lumbar spine/low back.    Otherwise tolerating chemotherapy well.    We discussed the situation in detail.  It seems that cancer has started to progress very slowly based on the rising CA 15-3 but PET scan remains a stable.  Potentially we can continue with Doxil considering that it is keeping the cancer slowed down and she is tolerating this well.  On the other hand we can switch to another chemotherapy like carboplatin.  We discussed pros and cons of each approach and at this time decided to continue with Doxil and repeat PET/CT in 2 to 3 months.  We will continue to monitor CA  15-3 serially.    Chemotherapy-induced neutropenia.  Continue Onpro because of chemotherapy related neutropenia.        Bone disease.  She has metastatic disease to the bone.  Previously she got palliative radiation to T12-L5 3000 cGy in 10 fractions from 9/6/2019 till 9/18/2019.  She was evaluated by orthopedics Dr. Bipin Elliott on 11/1/2019 and conservative management was recommended.    She was on Zometa every 3 months. She last received on 9/29/2020.  Because of progression of the disease in the bones, we switched to Xgeva which she received on 11/11/2020.    She had progression of the disease in the bones so we switched to Doxil but we continued xgeva.  PET scan on 7/1/2022 does not show any suspicious FDG uptake in the bones consistent with treated disease.  Repeat PET/CT in March 2023 was without any active FDG avid lesions.  PET/CT on 8/8/2023 overall showed pretty stable findings.  There has been slight increase in the FDG activity in a few places but without CT evidence of progression so it is indeterminate whether this represent technical factors versus a very early recurrence of active disease.  As mentioned above, we will continue with the same chemotherapy.  She will continue to get Xgeva and she will take calcium and vitamin D.    Cancer related pain.  Continue buprenorphine buccal film.  I advised her to take morphine as needed for the pain as this helps her.  I also prescribed Flexeril.  She will follow with Dr. Whitaker.      Migraines.  Doing much better with Emgality.  She takes as needed Ubrelvy which helps.  She is following with neurology.      Bilateral pulmonary emboli.  Continue Eliquis.      We did not address the following today.      Shingles.  The rash has dried out.  She completed well with acyclovir and prednisone.  She takes gabapentin for postherpetic neuralgia and is doing better.        Constipation. Continue senna.    Neuropathy.  This remains mild and stable with neuropathy  in her hands.    This is in addition to the chronic balance issues and heat and cold sensitivity from underlying multiple sclerosis which is also stable for years.  Continue to monitor.     Subcutaneous nodule in the scalp.  This looks like an epidermoid cyst.  She thinks it is a stable.  Continue to monitor.       Insomnia.  Continue trazodone.      Wall thickening of esophagus and FDG uptake of fundus of the stomach.  It could be in the setting of inflammation from recent nausea and vomiting.  Symptoms have resolved.  Continue to monitor clinically.    Vision changes.    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  It was thought that this could be due to Taxol as patient reported to the ophthalmologist that she was on Taxol in September 2019 when her symptoms started.  But she was not on Taxol in September 2019 when she started noticing the visual changes so Taxol is not responsible for this.  The first dose of Taxol was on 11/5/2019.   She had left cataract extraction on 2/8/2021 and she has noticed significant improvement in her vision.  Thinks that her vision is back to her usual.      Increased FDG ability in the colon.  She had FDG uptake in the cecum and ascending colon but no corresponding lesion was seen on the CT scan.  She is completely asymptomatic so at this time we will continue to observe.    Discussion regarding healthcare directives.  On previous visit she told me that she will bring the health care directive for our records but she forgot so I told her to bring it on next visit.      Breast implant removal.  She tells me that she was planning to do breast implant removal because there was a recall on this.  Her surgery was scheduled for 9/24/2019.  Because of this new development of metastatic breast cancer and her recent radiation, surgery has been canceled.  We discussed that at this time treatment for metastatic breast cancer would take precedence over the  other surgery as the other surgery would require her to be off chemotherapy for several weeks and in that case the chances of cancer progression would be high and I would not recommend that.    Multiple sclerosis.  She will continue to follow with her neurologist Dr. Quiles.  Currently multiple sclerosis is under control and currently she is not taking any medications.    Return to clinic in 5 weeks    All of her questions were answered to her satisfaction.  She is agreeable and comfortable with the plan.    Dewayne Zepeda MD    I spent >43 minutes on this visit on the date of service, including the video time, reviewing records and labs and imaging and placing new orders as well as coordination of care and documentation.

## 2023-08-10 NOTE — PROGRESS NOTES
Virtual Visit Details    Type of service:  Video Visit     Originating Location (pt. Location): Home    Distant Location (provider location):  On-site  Platform used for Video Visit: [employer]  Video start time.  8:40 AM  Video stop time.  9:01 AM

## 2023-08-16 NOTE — TELEPHONE ENCOUNTER
Received TextMastert message from patient requesting refill of Belbuca.     Last refill: 7/15/2023  Last office visit: 5/31/2023  Scheduled for follow up 9/6/2023     Will route request to MD for review.     Reviewed [place]  Report.

## 2023-08-18 NOTE — PROGRESS NOTES
Port site scrubbed with Chloraprep for 30 seconds. Accessed port using sterile technique. Tubes drawn in rainbow order. See documentation flowsheet. Port needle left accessed for treatment. Tolerated port access and draw without complaint.     Devorah Parham RN, BSN

## 2023-08-18 NOTE — CONFIDENTIAL NOTE
Medical Oncology Update:    I was asked by our infusion team to see this patient for new findings of redness in eye. The patient is here for doxil and xgeva.     It appears pt has subconjunctival hemorrhage, without conjunctivitis. She first noted it this morning, about 1 cm triangle shaped area of blood in sclera of right lateral eye. There is no associated bullous elevation of the conjunctiva. There is no visible trama or foreign bodies. It does not appear to be growing or changing since she first noticed it this morning. She does not have any associated vision changes, HA, sinus congestion, fever, cough, discharge from eye. She does not have any significant pain associated with this. No known trauma. She is on eliquis for PE but denies accidentally taking extra doses or concurrent use of NSAIDS including aspirin. Her plt are normal today, sosa 110K 8/1/23. She has had this before about 1-2 years ago, but is unsure of which eye was affected, she reports it spontaneously remitted.     Pt has subconjunctival hemorrhage without conjunctivitis and without alarm sx.  Sx management with lubricating eye drops, avoid eye irritants ie sleeping with fan on  Alarm sx discussed, pt knows to call triage line if any concerns and seek evaluation from her eye doctor  Will ask RNCC to touch base with patient on Monday to reassess    Mayra Frausto D.O.  Hematology/Oncology  [facility]

## 2023-08-18 NOTE — PROGRESS NOTES
Infusion Nursing Note:  Shaneka Alvarez presents today for C18D1 Doxil/Xgeva.    Patient seen by provider today: No   present during visit today: Not Applicable.    Note: N/A.    Intravenous Access:  Implanted Port.    Treatment Conditions:  Lab Results   Component Value Date    HGB 12.0 08/18/2023    WBC 3.5 (L) 08/18/2023    ANEU 22.4 (H) 07/27/2023    ANEUTAUTO 2.1 08/18/2023     08/18/2023        Lab Results   Component Value Date     08/18/2023    POTASSIUM 4.3 08/18/2023    MAG 2.0 03/12/2023    CR 0.75 08/18/2023    RAFAELA 8.8 08/18/2023    BILITOTAL 0.3 08/18/2023    ALBUMIN 3.9 08/18/2023    ALT 21 08/18/2023    AST 29 08/18/2023       Results reviewed, labs MET treatment parameters, ok to proceed with treatment.    Post Infusion Assessment:  Patient tolerated infusion without incident.  Patient tolerated injection without incident.  Blood return noted pre and post infusion.  Site patent and intact, free from redness, edema or discomfort.  No evidence of extravasations.  Access discontinued per protocol.     Discharge Plan:   Patient will return 9/15/2023 for next appointment.   Future appts have been reviewed and crosschecked with appt note and plan.  Patient discharged in stable condition accompanied by: self.  Departure Mode: Ambulatory.    Joanne Ludwig, RN-BSN, PHN, OCN  [facility]

## 2023-08-19 NOTE — TELEPHONE ENCOUNTER
Called patient back to ensure on call provider did call her and she said no. Patient given answering service phone number to call back. Asked her to call back if still no response.     LATOYA DUFF RN  [facility] nurse advisors  8/19/2023  3:01 PM

## 2023-08-19 NOTE — TELEPHONE ENCOUNTER
Pt calling back to state that the on call provider has not returned her page.     Writer connected patient directly to the switchboard and they are paging the on call provider for the patient regarding the call from earlier this AM with FNA.     Viky Rodriguez RN  [facility] Nurse Advisor 3:26 PM 8/19/2023

## 2023-08-19 NOTE — PROGRESS NOTES
Received page to call patient regarding hemorrhage and eye that started yesterday and now has new headache and pain when turning eye to 1 side. I called and spoke with the patient. She has had migraines in the past but this headache is different from her usual migraines. She did receive chemotherapy with Doxil and Xgeva injection the other day for her metastatic breast cancer. Her last CBC was normal. I advised she go to the emergency department for further evaluation and imaging. She agreed to go to emergency department.    Kayleigh Powell MD

## 2023-08-19 NOTE — TELEPHONE ENCOUNTER
"Is patient at [facility], Had chemo yesterday, Yesterday woke with \"blood\" in R eye. Provider advised to go ahead and get chemo treatment. Was told to re evaluate today. Today area is much more red and covering more of the sclera. Is on Eliquis. Dr. Frausto wanted her to call back if worsening.     This writer called answering service for [facility]  ([facility]) and page is going out to Dr. Medley at [phone] to call patient.     LATOYA DUFF RN  [facility] nurse advisors  8/19/2023  10:47 AM  Reason for Disposition   Health Information question, no triage required and triager able to answer question    Additional Information   Negative: [1] Caller is not with the adult (patient) AND [2] reporting urgent symptoms   Negative: Lab result questions   Negative: Medication questions   Negative: Caller can't be reached by phone   Negative: Caller has already spoken to PCP or another triager   Negative: RN needs further essential information from caller in order to complete triage   Negative: Requesting regular office appointment   Negative: [1] Caller requesting NON-URGENT health information AND [2] PCP's office is the best resource    Protocols used: Information Only Call - No Triage-A-    "

## 2023-08-22 NOTE — PROGRESS NOTES
Spoke to patient regarding below message. Patient reports no alarm sx and that her eye is continuing to improve. Patient has triage line to call back with any alarm sxs.    Pearl Orta, RN, BSN  RN Care Coordinator - Oncology  [facility]

## 2023-08-22 NOTE — TELEPHONE ENCOUNTER
----- Message from Tali Orta RN sent at 8/18/2023 12:44 PM CDT -----    ----- Message -----  From: Mayra Frausto DO  Sent: 8/18/2023  12:43 PM CDT  To: Dewayne Zepeda MD; Tali Orta RN    Hi,    I was asked to assess this patient in infusion prior to doxil. It appears she has a right eye subconjunctival hemorrhage, no alarm sx, no conjunctivitis, plt are normal while on eliquis for PE. I asked her to do supportive care including lubricating eye drops. We are giving doxil and xgeva today. Please see my note in her chart for details.    Pearl can you please check in with her on Monday to make sure no alarm sx?    Thanks,  SK

## 2023-08-22 NOTE — LETTER
8/22/2023         RE: Shaneka Alvarez  [id number] [facility] [address]        Dear Colleague,    Thank you for referring your patient, Shaneka Alvarez, to the [facility]. Please see a copy of my visit note below.    CLINICAL NUTRITION SERVICES - ASSESSMENT NOTE    Shaneka Alvarez 63 year old referred for MNT related to breast cancer.      Time Spent: 45 minutes  Visit Type: initial - face to face  Pt accompanied by: self  Referring Physician: Annie Bailon 7/27/23  F43.23 (ICD-10-CM) - Adjustment reaction with anxiety and depression     NUTRITION HISTORY  Factors affecting nutrition intake include:constipation, decreased appetite, and taste and textures aversions  Current diet/appetite: general diet/fair appetite    Chemotherapy: Doxorubicin  Radiation: No    Shaneka tells me that her appetite has been fair/poor due to taste and texture aversions.  Her intake is up and down but she cannot associate any trends in appetite from chemo cycle.   When her intake is low for solid foods, she will have a protein shake or home made smoothie.   She tends to do better with liquids rather than solid foods.   She has a strong aversion to meat, chicken and turkey.  She has always liked beef as [place].   She can tolerate a hamburger every couple of weeks.   She likes eggs, cottage cheese and yogurt.    Diet Recall  Breakfast Cereal with milk OR greek yogurt with granola and fruit   Lunch Cream soups, fresh veggies with dip or fresh fruit OR smoothie with frozen fruit pack, yogurt, milk    Dinner Home made pancake with syrup and fruit   Snacks Premier protein shake (banana cream), 2-3 per week, fruit, seeds/nuts   Beverages Water, protein shakes       Treatment Plan:  Oncology History   Breast cancer (H)   8/11/2015 Initial Diagnosis    Breast cancer (H)     11/11/2020 - 12/14/2021 Chemotherapy    OP ONC Breast Cancer - Sacituzumab  Plan Provider: Dewayne Zepeda,  "MD  Treatment goal: Palliative  Line of treatment: [No plan line of treatment]     12/28/2021 - 12/28/2021 Chemotherapy    Oral ONC Androgen Receptor Positive Breast Cancer - Enzalutamide   Plan Provider: Dewayne Zepeda MD  Treatment goal: Palliative  Line of treatment: [No plan line of treatment]     4/14/2022 -  Chemotherapy    OP ONC Breast Cancer - Liposomal DOXOrubicin (DOXIL)  Plan Provider: Dewayne Zepeda MD  Treatment goal: Palliative  Line of treatment: [No plan line of treatment]     Metastatic breast cancer   9/18/2019 Initial Diagnosis    Metastatic breast cancer (H)     11/11/2020 - 12/14/2021 Chemotherapy    OP ONC Breast Cancer - Sacituzumab  Plan Provider: Dewayne Zepeda MD  Treatment goal: Palliative  Line of treatment: [No plan line of treatment]     11/11/2020 -  Supportive Treatment    OP ONC Denosumab (Xgeva)  Plan Provider: Dewayne Zepeda MD  Treatment goal: Other  Line of treatment: [No plan line of treatment]     12/28/2021 - 12/28/2021 Chemotherapy    Oral ONC Androgen Receptor Positive Breast Cancer - Enzalutamide   Plan Provider: Dewayne Zepeda MD  Treatment goal: Palliative  Line of treatment: [No plan line of treatment]     4/14/2022 -  Chemotherapy    OP ONC Breast Cancer - Liposomal DOXOrubicin (DOXIL)  Plan Provider: Dewayne Zepeda MD  Treatment goal: Palliative  Line of treatment: [No plan line of treatment]     Bone metastases   10/22/2019 Initial Diagnosis    Bone metastases     11/11/2020 -  Supportive Treatment    OP ONC Denosumab (Xgeva)  Plan Provider: Dewayne Zepeda MD  Treatment goal: Other  Line of treatment: [No plan line of treatment]     Treatment plan has been reviewed.    ANTHROPOMETRICS  Height: 62\"  Weight: 125 lbs/56kg  BMI: 22  Weight Status:  Normal BMI  Weight History: +/- 5 lb  Wt Readings from Last 10 Encounters:   08/18/23 59.6 kg (131 lb 6.4 oz)   08/10/23 56.7 kg (125 lb)   07/31/23 58.1 kg (128 lb 1.4 oz)   07/27/23 55.8 kg (123 lb)   07/21/23 59.2 kg (130 lb 9.6 " oz)   07/18/23 56.7 kg (125 lb)   07/10/23 56.7 kg (125 lb)   07/03/23 56.7 kg (125 lb)   06/23/23 59.5 kg (131 lb 1.6 oz)   06/20/23 59.1 kg (130 lb 3.2 oz)     Dosing Weight: 56kg    Medications/vitamins/minerals/herbals:   Reviewed    Labs:   Labs reviewed    ASSESSED NUTRITION NEEDS:  Estimated Energy Needs: 1732-7024 kcals (25-30 Kcal/Kg)  Justification: maintenance  Estimated Protein Needs: 65-75 grams protein (1.2-1.5 g pro/Kg)  Justification: increased needs with chemo  Estimated Fluid Needs: 0188-4975  mL   Justification: increased needs with chemo    NUTRITION DIAGNOSIS:  Inadequate oral intake related to decreased ability to consume sufficient energy due to side effects of cancer therapy as evidenced by 5 lb wt loss x past one month, dietary intake 50-75% estimated needs.      INTERVENTIONS  Provided written & verbal education:     - Reviewed nutrition and hydration needs.   Advised pt to aim for at least 1700kcal and 65-75g protein daily.    Advised pt to aim for 8 cups non-caffeine containing beverages (water/electrolytes) daily.    - Discussed strategies to help fortify meals and snacks with calories and protein. Suggested powdered milk and olive oil etc.   - Reviewed sources of protein as alternatives to meat which are less desirable.   - Reviewed common barriers to eating with cancer treatment.  Discussed ways to cope with taste aversions. Suggested trying Miracle berry for taste aversions. Reviewed foods choices that may be more palatable to try (cream of wheat, polenta, marinara based foods etc).   - Reviewed recipes for home made shakes/smoothies to prevent flavor fatigue.      Provided pt with corresponding education materials/handouts on:  Academy of Nutrition and Dietetics High christiano/High protein recipes, Academy of [employer] Dietetics High protein list, Sources of Protein, Coping with constipation    Pt verbalize understanding of materials provided during consult.   Patient Understanding:  good  Expected patient engagement: good     Goals    Aim for 1700kcal and 65g protein/day  2.    Weight maintenance     Follow-Up Plans: Pt has RD contact information for questions.      Aruna Saldana RD, , LD        Again, thank you for allowing me to participate in the care of your patient.        Sincerely,        Aruna Saldana RD

## 2023-08-22 NOTE — PROGRESS NOTES
CLINICAL NUTRITION SERVICES - ASSESSMENT NOTE    Shaneka Alvarez 63 year old referred for MNT related to breast cancer.      Time Spent: 45 minutes  Visit Type: initial - face to face  Pt accompanied by: self  Referring Physician: Annie Bailon 7/27/23  F43.23 (ICD-10-CM) - Adjustment reaction with anxiety and depression     NUTRITION HISTORY  Factors affecting nutrition intake include:constipation, decreased appetite, and taste and textures aversions  Current diet/appetite: general diet/fair appetite    Chemotherapy: Doxorubicin  Radiation: No    Shaneka tells me that her appetite has been fair/poor due to taste and texture aversions.  Her intake is up and down but she cannot associate any trends in appetite from chemo cycle.   When her intake is low for solid foods, she will have a protein shake or home made smoothie.   She tends to do better with liquids rather than solid foods.   She has a strong aversion to meat, chicken and turkey.  She has always liked beef as she [place].   She can tolerate a hamburger every couple of weeks.   She likes eggs, cottage cheese and yogurt.    Diet Recall  Breakfast Cereal with milk OR greek yogurt with granola and fruit   Lunch Cream soups, fresh veggies with dip or fresh fruit OR smoothie with frozen fruit pack, yogurt, milk    Dinner Home made pancake with syrup and fruit   Snacks Premier protein shake (banana cream), 2-3 per week, fruit, seeds/nuts   Beverages Water, protein shakes       Treatment Plan:  Oncology History   Breast cancer (H)   8/11/2015 Initial Diagnosis    Breast cancer (H)     11/11/2020 - 12/14/2021 Chemotherapy    OP ONC Breast Cancer - Sacituzumab  Plan Provider: Dewayne Zepeda MD  Treatment goal: Palliative  Line of treatment: [No plan line of treatment]     12/28/2021 - 12/28/2021 Chemotherapy    Oral ONC Androgen Receptor Positive Breast Cancer - Enzalutamide   Plan Provider: Dewayne Zepeda MD  Treatment goal: Palliative  Line of  "treatment: [No plan line of treatment]     4/14/2022 -  Chemotherapy    OP ONC Breast Cancer - Liposomal DOXOrubicin (DOXIL)  Plan Provider: Dewayne Zepeda MD  Treatment goal: Palliative  Line of treatment: [No plan line of treatment]     Metastatic breast cancer   9/18/2019 Initial Diagnosis    Metastatic breast cancer (H)     11/11/2020 - 12/14/2021 Chemotherapy    OP ONC Breast Cancer - Sacituzumab  Plan Provider: Dewayne eZpeda MD  Treatment goal: Palliative  Line of treatment: [No plan line of treatment]     11/11/2020 -  Supportive Treatment    OP ONC Denosumab (Xgeva)  Plan Provider: Dewayne Zepeda MD  Treatment goal: Other  Line of treatment: [No plan line of treatment]     12/28/2021 - 12/28/2021 Chemotherapy    Oral ONC Androgen Receptor Positive Breast Cancer - Enzalutamide   Plan Provider: Dewayne Zepeda MD  Treatment goal: Palliative  Line of treatment: [No plan line of treatment]     4/14/2022 -  Chemotherapy    OP ONC Breast Cancer - Liposomal DOXOrubicin (DOXIL)  Plan Provider: Dewayne Zepeda MD  Treatment goal: Palliative  Line of treatment: [No plan line of treatment]     Bone metastases   10/22/2019 Initial Diagnosis    Bone metastases     11/11/2020 -  Supportive Treatment    OP ONC Denosumab (Xgeva)  Plan Provider: Dewayne Zepeda MD  Treatment goal: Other  Line of treatment: [No plan line of treatment]     Treatment plan has been reviewed.    ANTHROPOMETRICS  Height: 62\"  Weight: 125 lbs/56kg  BMI: 22  Weight Status:  Normal BMI  Weight History: +/- 5 lb  Wt Readings from Last 10 Encounters:   08/18/23 59.6 kg (131 lb 6.4 oz)   08/10/23 56.7 kg (125 lb)   07/31/23 58.1 kg (128 lb 1.4 oz)   07/27/23 55.8 kg (123 lb)   07/21/23 59.2 kg (130 lb 9.6 oz)   07/18/23 56.7 kg (125 lb)   07/10/23 56.7 kg (125 lb)   07/03/23 56.7 kg (125 lb)   06/23/23 59.5 kg (131 lb 1.6 oz)   06/20/23 59.1 kg (130 lb 3.2 oz)     Dosing Weight: 56kg    Medications/vitamins/minerals/herbals:   Reviewed    Labs:   Labs " reviewed    ASSESSED NUTRITION NEEDS:  Estimated Energy Needs: 9506-6498 kcals (25-30 Kcal/Kg)  Justification: maintenance  Estimated Protein Needs: 65-75 grams protein (1.2-1.5 g pro/Kg)  Justification: increased needs with chemo  Estimated Fluid Needs: 0182-4727  mL   Justification: increased needs with chemo    NUTRITION DIAGNOSIS:  Inadequate oral intake related to decreased ability to consume sufficient energy due to side effects of cancer therapy as evidenced by 5 lb wt loss x past one month, dietary intake 50-75% estimated needs.      INTERVENTIONS  Provided written & verbal education:     - Reviewed nutrition and hydration needs.   Advised pt to aim for at least 1700kcal and 65-75g protein daily.    Advised pt to aim for 8 cups non-caffeine containing beverages (water/electrolytes) daily.    - Discussed strategies to help fortify meals and snacks with calories and protein. Suggested powdered milk and olive oil etc.   - Reviewed sources of protein as alternatives to meat which are less desirable.   - Reviewed common barriers to eating with cancer treatment.  Discussed ways to cope with taste aversions. Suggested trying Miracle berry for taste aversions. Reviewed foods choices that may be more palatable to try (cream of wheat, polenta, marinara based foods etc).   - Reviewed recipes for home made shakes/smoothies to prevent flavor fatigue.      Provided pt with corresponding education materials/handouts on:  Academy of Nutrition and Dietetics High christiano/High protein recipes, Academy of [employer] Dietetics High protein list, Sources of Protein, Coping with constipation    Pt verbalize understanding of materials provided during consult.   Patient Understanding: good  Expected patient engagement: good     Goals    Aim for 1700kcal and 65g protein/day  2.    Weight maintenance     Follow-Up Plans: Pt has RD contact information for questions.      Aruna Saldana RD, , LD
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
## 2023-08-28 NOTE — Clinical Note
Shaneka Alvarez was seen and treated in our emergency department on 8/28/2023.  She may return to work on 08/31/2023.       If you have any questions or concerns, please don't hesitate to call.      Geoffrey Bustos MD
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
## 2023-08-29 NOTE — MEDICATION SCRIBE - ADMISSION MEDICATION HISTORY
Medication Scribe Admission Medication History    Admission medication history is complete. The information provided in this note is only as accurate as the sources available at the time of the update.    Medication reconciliation/reorder completed by provider prior to medication history? No    Information Source(s): Patient via in-person    Pertinent Information: n/a    Changes made to PTA medication list:  Added: None  Deleted: None  Changed: None    Medication Affordability:  Not including over the counter (OTC) medications, was there a time in the past 3 months when you did not take your medications as prescribed because of cost?: Unable to Assess    Allergies reviewed with patient and updates made in EHR: yes    Medication History Completed By: ALVARO GARRISON 8/28/2023 7:27 PM    Prior to Admission medications    Medication Sig Last Dose Taking? Auth Provider Long Term End Date   apixaban ANTICOAGULANT (ELIQUIS ANTICOAGULANT) 5 MG tablet Take 1 tablet (5 mg) by mouth 2 times daily 8/28/2023 at am Yes Dewayne Zepeda MD     Buprenorphine HCl (BELBUCA) 300 MCG FILM buccal film Place 1 Film (300 mcg) inside cheek every 12 hours 8/28/2023 at am Yes Macy Cox DO     busPIRone (BUSPAR) 15 MG tablet Take 1 tablet (15 mg) by mouth 2 times daily 8/28/2023 at am Yes Spenser Landeros PA-C Yes    calcium citrate-vitamin D (CITRACAL) 315-250 MG-UNIT TABS Take 1 tablet by mouth every morning  8/28/2023 at am Yes Reported, Patient     Cholecalciferol (VITAMIN D3 PO) Take 2,000 Units by mouth every morning  8/28/2023 at am Yes Reported, Patient     cyclobenzaprine (FLEXERIL) 5 MG tablet Take 1 tablet (5 mg) by mouth 3 times daily as needed for muscle spasms Past Week at unkn Yes Dewayne Zepead MD     galcanezumab-gnlm (EMGALITY) 120 MG/ML injection Inject 240 mg subcutaneous first month and then inject 120 mg subcutaneous every 28 days  Patient taking differently: Inject 120 mg Subcutaneous every 28 days 8/14/2023  at am Yes Emelia Mixon APRN CNP     LORazepam (ATIVAN) 0.5 MG tablet Take 1-2 tablets (0.5-1 mg) by mouth every 6 hours as needed for anxiety More than a month at unkn Yes Joi Diamond PA-C     metoclopramide (REGLAN) 5 MG tablet Take 1-2 tablets (5-10 mg) by mouth every 6 hours as needed (nausea/vomiting/migraine) Past Month at unkn Yes Emelia Mixon APRN CNP     morphine (MSIR) 15 MG IR tablet Take 1-2 tablets (15-30 mg) by mouth every 3 hours as needed for pain 8/27/2023 at pm Yes Annie Bailon APRN CNP     OLANZapine (ZYPREXA) 5 MG tablet Take 2.5-5 mg by mouth 2 times daily as needed for nausea Past Month at unkn Yes Reported, Patient No    ondansetron (ZOFRAN ODT) 4 MG ODT tab Take 4 mg by mouth every 8 hours as needed for nausea Past Month at unkn Yes Reported, Patient     propranolol ER (INDERAL LA) 80 MG 24 hr capsule Take 160 mg by mouth At Bedtime And 80 mg every morning 8/27/2023 at hs Yes Reported, Patient Yes    propranolol ER (INDERAL LA) 80 MG 24 hr capsule Take 1 capsule by mouth in the morning and 2 capsules by mouth at night if tolerated  Patient taking differently: Take 80 mg by mouth every morning  and 160 mg by mouth at night if tolerated 8/28/2023 at am Yes Emelia Mixon APRN CNP Yes    traZODone (DESYREL) 50 MG tablet Take 1 tablet (50 mg) by mouth At Bedtime 8/27/2023 at hs Yes Annie Bailon APRN CNP Yes    ubrogepant (UBRELVY) 100 MG tablet Take 1 tablet (100 mg) by mouth at onset of headache (may repeat in 2 hours as needed. Max 200 mg in 24 hours.) Past Week at unkn Yes Emelia Mixon APRN CNP     venlafaxine (EFFEXOR XR) 75 MG 24 hr capsule Take 3 capsules (225 mg) by mouth daily 8/28/2023 at am Yes Spenser Landeros PA-C Yes    vitamin B-2 (RIBOFLAVIN) 25 MG TABS tablet Take 1 tablet by mouth daily 8/28/2023 at am Yes Reported, Patient     Zavegepant HCl 10 MG/ACT SOLN Spray 10 mg in nostril See Admin  Instructions Spray a single spray in one nostril, as needed; MAX one spray /24 hour period More than a month at unavailable Yes Emelia Mixon APRN CNP     capecitabine (XELODA) 500 MG tablet CHEMO Take 4 tabs ( 2000mg ) in the morning and 3 tabs (1500mg ) in the evening. Days 1 through 14, then off for 7 days.  Patient not taking: Reported on 10/7/2019   Dewayne Zepeda MD Yes 10/23/19

## 2023-08-29 NOTE — DISCHARGE INSTRUCTIONS
Overall your knee appears to be well-healing.  X-ray to the knee was unremarkable, no signs of acute fracture.  You do have several old appearing fractures in the lumbar spine which have been seen on prior studies.  Your urinalysis did not show any signs of infection or other significant abnormalities.  We obtained some blood work today due to your vomiting and medical history.  You have a very small elevation in white blood cell count without obvious cause.  Otherwise your tests were within normal limits.  Given that your abdomen is nontender and you have no other symptoms at present I do not believe additional testing is indicated at this time.  Although your knee appears to be well-healing, it is slightly warm to touch.  With your elevated white blood cell count I will prescribe a course of antibiotics to help prevent/treat developing infection.  Please take these as instructed until entirely gone.  Prescription for nausea medication was also provided.  Follow-up with your primary care doctor and oncologist as soon as possible for reevaluation.  I also provided referral to spine/neurosurgery for evaluation of the chronic fractures on CT, make an appointment as soon as possible.  Monitor symptoms very closely at home, return to the emergency department immediately in the meantime for any new or acutely worsened symptoms, such as fever, severely worsened back pain, incontinence of urine or stool, persistent nausea/vomiting.

## 2023-08-29 NOTE — TELEPHONE ENCOUNTER
1st attempt at workque referral. I called and left a voice mail.    Referred by Geoffrey Bustos MD  Bilateral low back pain without sciatica, unspecified chronicity [M54.50]  MRI Lumbar Spine 7/31/23 - [facility]  PT  Inj  Surg

## 2023-09-01 NOTE — LETTER
9/1/2023       RE: Shaneka Alvarez  [id number] [address] [facility] [id number]     Dear Colleague,    Thank you for referring your patient, Shaneka Alvarez, to the [facility] NEUROLOGY [facility]INIC [place] at [facility]. Please see a copy of my visit note below.    Shaneka is a 63 year old who is being evaluated via a billable video visit.      Subjective  Shaneka is a 63 year old, presenting for the following health issues:  RECHECK (3 mo follow up migraine)    Has been doing very well. Emgality has been working very well. Injections tolerates well.   Ubrelvy as needed.   Zomig works extremely well but on back order.   Was not able to get zavegepant-was not approved.   Takes reglan and benedryl -helpful  Never got toradol -insurance would not   Propranolol 80 mg am and 160 mg at bedtime -no side effects reported  Occasionally takes ondansetron for nausea    Plan:  Continue propranolol and Emgality for migraine prevention  Rescue treatment -ubrelvy as needed  Zomig nasal as needed   Reglan+benedryl as needed   Ondansetron as needed   Follow up in 6 months or sooner if needed         Objective   Vitals - Patient Reported  Pain Score: No Pain (0)  Physical Exam   GENERAL: Healthy, alert and no distress  EYES: Eyes grossly normal to inspection. RESP: No audible wheeze, cough, or visible cyanosis.  No visible retractions or increased work of breathing.    NEURO: Cranial nerves grossly intact.  Mentation and speech appropriate for age.  PSYCH: Mentation appears normal, affect normal/bright, judgement and insight intact, normal speech and appearance well-groomed.    I discussed all my recommendations with Shaneka Alvarez who verbalizes understanding and comfortable with the plan.      15 minutes spent on the date of the encounter doing video access, chart  review, results review,  meds review, treatment plan, documentation and further activities as noted above        Again,  thank you for allowing me to participate in the care of your patient.      Sincerely,    GUTIERREZ Dunn CNP

## 2023-09-01 NOTE — PROGRESS NOTES
Shaneka is a 63 year old who is being evaluated via a billable video visit.      Subjective   Shaneka is a 63 year old, presenting for the following health issues:  RECHECK (3 mo follow up migraine)    Has been doing very well. Emgality has been working very well. Injections tolerates well.   Ubrelvy as needed.   Zomig works extremely well but on back order.   Was not able to get zavegepant-was not approved.   Takes reglan and benedryl -helpful  Never got toradol -insurance would not   Propranolol 80 mg am and 160 mg at bedtime -no side effects reported  Occasionally takes ondansetron for nausea    Plan:  Continue propranolol and Emgality for migraine prevention  Rescue treatment -ubrelvy as needed  Zomig nasal as needed   Reglan+benedryl as needed   Ondansetron as needed   Follow up in 6 months or sooner if needed         Objective    Vitals - Patient Reported  Pain Score: No Pain (0)  Physical Exam   GENERAL: Healthy, alert and no distress  EYES: Eyes grossly normal to inspection. RESP: No audible wheeze, cough, or visible cyanosis.  No visible retractions or increased work of breathing.    NEURO: Cranial nerves grossly intact.  Mentation and speech appropriate for age.  PSYCH: Mentation appears normal, affect normal/bright, judgement and insight intact, normal speech and appearance well-groomed.    I discussed all my recommendations with Shaneka Alvarez who verbalizes understanding and comfortable with the plan.      15 minutes spent on the date of the encounter doing video access, chart  review, results review,  meds review, treatment plan, documentation and further activities as noted above    GUTIERREZ Larsen, CNP [facility]  Headache certified  [facility]      Video-Visit Details  Type of service:  Video Visit   Originating Location (pt. Location): Home  Distant Location (provider location):  Off-site  Platform used for Video Visit: 3nder

## 2023-09-01 NOTE — PATIENT INSTRUCTIONS
Plan:  Continue propranolol and Emgality for migraine prevention  Rescue treatment -ubrelvy as needed  Zomig nasal as needed   Reglan+benedryl as needed   Ondansetron as needed   Follow up in 6 months or sooner if needed

## 2023-09-01 NOTE — NURSING NOTE
Is the patient currently in the state of [place]? YES    Visit mode:VIDEO    If the visit is dropped, the patient can be reconnected by: VIDEO VISIT: Text to cell phone:   Telephone Information:   Mobile [phone]       Will anyone else be joining the visit? NO  (If patient encounters technical issues they should call [phone]    How would you like to obtain your AVS? MyChart    Are changes needed to the allergy or medication list? Pt stated no med changes    Reason for visit: RECHECK (3 mo follow up migraine)    Suellen IGLESIAS

## 2023-09-05 NOTE — PROGRESS NOTES
Palliative Care Outpatient Clinic Progress Note    Patient Name: Shaneka Alvarez is being evaluated via a billable video visit.    Primary Provider:  [place], [facility]  Primary Oncologist: Dr Zepeda  Last seen by palliative care: myself, 5/31/23      Chief Complaint: worsened back pain    Background/Summary  Medical:  - breast cancer ER+/UT+, Her2 neg diagnosed in 2006. 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 Didn't tolerate Xeloda, paclitaxel Nov 2019-Jan 2020, treated with eribulin and zometa. PD seen on PET Nov 2020 as well as a new L 4th rib concerning for a pathologic fracture. Started Trodelvy 11/11/20, PD Dec 21, switched to casodex.               - PD again on PET/CT March 2022 including b/l iliac crest lesions, R clavicle, and multifocal T spine involvement. Switched to single agent Doxil April 2022, dose decrease to 80% at 2nd cycle in light of side effects. Tolerated that well. Complete response on scan Oct 2022, apparent slow progression based on rising . Ongoing observation on single-agent Doxil  - b/l PE found on PET July 2020, on anticoagulation  - MS with mild right-sided weakness    - long-standing history of migraines, follows with neurology. On monthly aimovig with ubrelvy prn  - SI joint pain, working with PT  - post-zoster neuralgia (ED Visit July 2022), completely resolved         Social  She lives with her    6 adult children, 1 lives locally  Middle-, currently not working. She misses it, but also realizes that many days she wouldn't be able to go to work.      Psychospiritual  Has been through traumatic experiences in the past. Addressed these with counselors prior to her diagnosis. Doing quite well  "now.     She has a very close friend who is also living with incurable breast cancer. They have good conversation and are important support to each other.      Care Planning   She is aware of the presumed gradual progression of her cancer and that she likely will have to move on from her current treatment plan in the near future. This is \"worrisome\" to her and she also trusts that there will indeed be another option after this one.   She is able to live with this knowledge without getting too anxious or worried, sleeps mostly ok.     POLST completed 4/1/20: DNR/selective treatments     Opioid Safety   Expected prognosis: > 1 year  Risk: Low (ORT 0)  Indication for Opioid Use: Moderate or Strong  Naloxone Script provided if patient also on benzodiazepine: yes  Indications for Tapering reviewed: rarely uses morphine     : reviewed, ok    Interim History:  At the last visit we discussed SI joint pain (was improving with PT, braces, icing, occasional ibuprofen), cancer-related pain was stable on belbuca. She had additional life stressors, but was coping ok.   She was seen in the ED 8/28 for a fall resulting in knee and increased lower back pain. Was given antibiotics for potential cellulitis around abrasion over knee, no red flags for new vertebral fracture    Patient is on the call by herself  History gathered today from: patient, medical chart    She has more lower back pain now, had to go to the ED a few times since our last visit. The pain is the same in location and quality, but more severe. No additional areas of pain.   She is still doing PT which helps, but only to a limited extent at this point.     She has a hard time walking due to the pain, which also limits her ability to participate in her usual activities    Morphine gives her some relief and allows her to be more involved, she has been taking this very rarely to minimize her opioid use.   She has not had injections or seen a specialist for her back. "       Impression & Recommendations & Counseliny/o woman with metastatic breast cancer on single-agent doxil, very gradual disease progression per tumor marker. Currently complicated by SI joint pain.     Pain: Imaging does show metastatic disease in that area. Difficult for me to tell whether that has progressed in line with her increasing symptoms. Limited effectiveness of PT and opioids.   - increase belbuca to 450mcg q12h  - discussed that it is ok for her to take morphine as needed in order to participate in her usual activities  - will check in about radiation to that area vs referral to spine clinic.     Coping: has excellent disease understanding and good social support. Doesn't feel the need to see palliative social work at this time.     Return to clinic in 6-8 weeks    Data / Chart Review:    Review of Systems:   ROS: 10 point ROS neg other than the symptoms noted above in the HPI and pertinents here.         Physical Exam:   Physical Exam:  Vital Signs not checked, virtual visit    CONSTIT: awake, appears comfortable  EENT: MMM, EOMI, no icterus  RESP: reg, nl effort, no cough  MSK: sitting up   SKIN: no rash, no obvious lesions  NEURO: alert, oriented x3  PSYCH: appropriate affect, memory and thought process intact    The rest of a comprehensive physical examination is deferred  due to public health emergency video visit restrictions.      Current pertinent medications:  Belbuca 300mg q12h  MSIR 15-30mg q3h prn (last script 2022)              Naloxone prescribed  acetaminophen 1000mg tid  Ibuprofen 600mg q6h prn  CBD cream     Dexamethasone 4mg q12h     Venlafaxine 225mg daily  Lorazepam 1mg q4h prn anxiety, nausea (last script Dec 2020)  Trazodone 50mg HS  Buspirone 15mg bid     Olanzapine prn nausea        No pertinent allergies      Lab and imaging data reviewed:  Comments:       Video-Visit Details    Type of service:  Video Visit    Physician has received verbal consent for a Video Visit  from the patient? Yes    Video Start Time: 10:04 AM    Video End Time (time video stopped): 1023    Originating Location (pt. Location): [place]    Distant Location (provider location):  [facility]   Distant Location (provider location):  On-site    Mode of Communication:  Video Conference via [facility]    Alva Whitaker MD  Palliative Medicine

## 2023-09-06 NOTE — PATIENT INSTRUCTIONS
Patient Instructions      Expand All Collapse All    Thank you for attending the Palliative Care Clinic appointment today.     1. Please start taking belbuca 450mcg twice a day once you receive the new films. Please discard the 300mcg films at that time.    - continue taking the morphine as needed    2. I will contact you about the referral. We will either explore further radiation or I'll put in the referral to the spine clinic.     Call if your symptoms change and the medications we have prescribed are not working.   Do not take your medications at any other dose or frequently than how they are prescribed. Call if you think we should make any changes.    Call us at least 3 workdays in advance if you need a medication refill.    Please have all your pill bottles ready for your next appointment.     Return to clinic in 6-8 weeks for a follow up.     You can reach the Palliative Care Team during business hours at the following number:    - [phone]  - or send me a [employer] message    To reach the Palliative Care Provider on-call After-hours or on holidays and weekends, call: [phone].  Please note that we are not able to provide pain medication refills on evenings or weekends.

## 2023-09-06 NOTE — NURSING NOTE
Is the patient currently in the state of [place]? YES    Visit mode:VIDEO    If the visit is dropped, the patient can be reconnected by: VIDEO VISIT: Text to cell phone:   Telephone Information:   Mobile [phone]       Will anyone else be joining the visit? NO  (If patient encounters technical issues they should call [phone])    How would you like to obtain your AVS? MyChart    Are changes needed to the allergy or medication list? Pt stated no changes to allergies and Pt stated no med changes    Reason for visit: ANA IGLESIAS

## 2023-09-06 NOTE — LETTER
9/6/2023         RE: Shaneka Alvarez  [address] [address] [facility] [facility] [address]        Dear Colleague,    Thank you for referring your patient, Shaneka Alvarez, to the [facility]. Please see a copy of my visit note below.    Palliative Care Outpatient Clinic Progress Note    Patient Name: Shaneka Alvarez is being evaluated via a billable video visit.    Primary Provider:  Clinic, [facility]  Primary Oncologist: Dr Zepeda  Last seen by palliative care: myself, 5/31/23      Chief Complaint: worsened back pain    Background/Summary  Medical:  - breast cancer ER+/WI+, Her2 neg diagnosed in 2006. 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 Didn't tolerate Xeloda, paclitaxel Nov 2019-Jan 2020, treated with eribulin and zometa. PD seen on PET Nov 2020 as well as a new L 4th rib concerning for a pathologic fracture. Started Trodelvy 11/11/20, PD Dec 21, switched to casodex.               - PD again on PET/CT March 2022 including b/l iliac crest lesions, R clavicle, and multifocal T spine involvement. Switched to single agent Doxil April 2022, dose decrease to 80% at 2nd cycle in light of side effects. Tolerated that well. Complete response on scan Oct 2022, apparent slow progression based on rising . Ongoing observation on single-agent Doxil  - b/l PE found on PET July 2020, on anticoagulation  - MS with mild right-sided weakness    - long-standing history of migraines, follows with neurology. On monthly aimovig with ubrelvy prn  - SI joint pain, working with PT  - post-zoster neuralgia (ED Visit July 2022), completely resolved         Social  She lives with her    6 adult children, 1 lives locally  Middle-school  "teacher, currently not working. She misses it, but also realizes that many days she wouldn't be able to go to work.      Psychospiritual  Has been through traumatic experiences in the past. Addressed these with counselors prior to her diagnosis. Doing quite well now.     She has a very close friend who is also living with incurable breast cancer. They have good conversation and are important support to each other.      Care Planning   She is aware of the presumed gradual progression of her cancer and that she likely will have to move on from her current treatment plan in the near future. This is \"worrisome\" to her and she also trusts that there will indeed be another option after this one.   She is able to live with this knowledge without getting too anxious or worried, sleeps mostly ok.     POLST completed 4/1/20: DNR/selective treatments     Opioid Safety   Expected prognosis: > 1 year  Risk: Low (ORT 0)  Indication for Opioid Use: Moderate or Strong  Naloxone Script provided if patient also on benzodiazepine: yes  Indications for Tapering reviewed: rarely uses morphine     : reviewed, ok    Interim History:  At the last visit we discussed SI joint pain (was improving with PT, braces, icing, occasional ibuprofen), cancer-related pain was stable on belbuca. She had additional life stressors, but was coping ok.   She was seen in the ED 8/28 for a fall resulting in knee and increased lower back pain. Was given antibiotics for potential cellulitis around abrasion over knee, no red flags for new vertebral fracture    Patient is on the call by herself  History gathered today from: patient, medical chart    She has more lower back pain now, had to go to the ED a few times since our last visit. The pain is the same in location and quality, but more severe. No additional areas of pain.   She is still doing PT which helps, but only to a limited extent at this point.     She has a hard time walking due to the pain, which " also limits her ability to participate in her usual activities    Morphine gives her some relief and allows her to be more involved, she has been taking this very rarely to minimize her opioid use.   She has not had injections or seen a specialist for her back.       Impression & Recommendations & Counseliny/o woman with metastatic breast cancer on single-agent doxil, very gradual disease progression per tumor marker. Currently complicated by SI joint pain.     Pain: Imaging does show metastatic disease in that area. Difficult for me to tell whether that has progressed in line with her increasing symptoms. Limited effectiveness of PT and opioids.   - increase belbuca to 450mcg q12h  - discussed that it is ok for her to take morphine as needed in order to participate in her usual activities  - will check in about radiation to that area vs referral to spine clinic.     Coping: has excellent disease understanding and good social support. Doesn't feel the need to see palliative social work at this time.     Return to clinic in 6-8 weeks    Data / Chart Review:    Review of Systems:   ROS: 10 point ROS neg other than the symptoms noted above in the HPI and pertinents here.         Physical Exam:   Physical Exam:  Vital Signs not checked, virtual visit    CONSTIT: awake, appears comfortable  EENT: MMM, EOMI, no icterus  RESP: reg, nl effort, no cough  MSK: sitting up   SKIN: no rash, no obvious lesions  NEURO: alert, oriented x3  PSYCH: appropriate affect, memory and thought process intact    The rest of a comprehensive physical examination is deferred  due to public health emergency video visit restrictions.      Current pertinent medications:  Belbuca 300mg q12h  MSIR 15-30mg q3h prn (last script 2022)              Naloxone prescribed  acetaminophen 1000mg tid  Ibuprofen 600mg q6h prn  CBD cream     Dexamethasone 4mg q12h     Venlafaxine 225mg daily  Lorazepam 1mg q4h prn anxiety, nausea (last script Dec  2020)  Trazodone 50mg HS  Buspirone 15mg bid     Olanzapine prn nausea        No pertinent allergies      Lab and imaging data reviewed:  Comments:       Video-Visit Details    Type of service:  Video Visit    Physician has received verbal consent for a Video Visit from the patient? Yes    Video Start Time: 10:04 AM    Video End Time (time video stopped): 1023    Originating Location (pt. Location): [place]    Distant Location (provider location):  [facility]   Distant Location (provider location):  On-site    Mode of Communication:  Video Conference via [employer]    Alva Whitaker MD  Palliative Medicine      Again, thank you for allowing me to participate in the care of your patient.        Sincerely,        Alva Whitaker MD

## 2023-09-13 PROBLEM — M46.1 BILATERAL SACROILIITIS (H): Status: ACTIVE | Noted: 2023-01-01

## 2023-09-13 PROBLEM — M53.3 SACROILIAC JOINT PAIN: Status: ACTIVE | Noted: 2023-01-01

## 2023-09-13 NOTE — LETTER
9/13/2023       RE: Shaneka Alvarez  [id number] [facility] [address]       Dear Colleague,    Thank you for referring your patient, Shaneka Alvarez, to the [facility] [place] at [facility]. Please see a copy of my visit note below.                          [facility] Consultation    Date of visit: 9/13/2023    Reason for consultation:    Shaneka Alvarez is a 63 year old female who is seen in consultation today at the request of her provider, Alva Whitaker.    Primary Care Provider is [facility], [facility].  Pain medications are being prescribed by PCP.    Please see the [facility] health questionnaire which the patient completed and reviewed with me in detail.    Chief Complaint:    No chief complaint on file.      Pain history:  Shaneka Alvarez is a 63 year old female who first started having problems with pain in the lower back that has been previously diagnosed as sacroiliitis. Standing is the worst and walking is also painful. SItting is tolerable but requires frequent repositioning. The pain has been present for four years and is associated with metastatic breast cancer. She uses a cane sometimes, and feels that she needs to use it more as she feels that her legs buckle. She has done PT and the stretches are helpful.     The pain wakes her up at night. She points directly to the PSIS on both sides of her low back.       Pain rating: intensity ranges from 2/10 to 7/10, and Averages 7/10 on a 0-10 scale.  Aggravating factors include: standing, walking  Relieving factors include: repositioning  Any bowel or bladder incontinence: nirmala    Current treatments include:  Belbuca  Flexeril  MSIR 15-30 mg TID  Venlafaxine    Previous medication treatments included:      Other treatments have included:  Shaneka Alvarez has not been seen at a pain clinic in  the past.    PT: yes  Acupuncture: yes  TENs Unit: yes  Injections: yes    Past Medical History:  Past Medical History:   Diagnosis Date    Breast cancer (H)     Age 44    Cataract     left eye    Chemotherapy induced nausea and vomiting 12/15/2020    Common migraine     Esophageal reflux     H/O bilateral mastectomy     Mild major depression (H)     Multiple sclerosis (H)     Pulmonary embolism (H)      Patient Active Problem List    Diagnosis Date Noted    Intractable pain 07/31/2023     Priority: Medium    Fall, initial encounter 07/31/2023     Priority: Medium    Left-sided low back pain with left-sided sciatica 04/05/2023     Priority: Medium    SI (sacroiliac) joint dysfunction 04/05/2023     Priority: Medium    Chronic bilateral low back pain without sciatica 04/05/2023     Priority: Medium    Elevated serum creatinine 03/18/2022     Priority: Medium    Hyponatremia 03/17/2022     Priority: Medium    Vomiting 03/17/2022     Priority: Medium    Bony metastasis 03/17/2022     Priority: Medium    Acute kidney injury (H) 03/17/2022     Priority: Medium    Sepsis without acute organ dysfunction (H)-possible  03/17/2022     Priority: Medium    Closed fracture of pedicle of lumbar vertebra, initial encounter (H) 03/17/2022     Priority: Medium    Acute midline low back pain, unspecified whether sciatica present 03/17/2022     Priority: Medium    Anemia, unspecified type 10/18/2021     Priority: Medium    Hyperphosphatemia 07/28/2021     Priority: Medium    Drug-induced polyneuropathy (H) 02/18/2021     Priority: Medium    Cancer associated pain 02/03/2021     Priority: Medium    Posterior subcapsular polar age-related cataract of both eyes 01/29/2021     Priority: Medium     Added automatically from request for surgery [id number]      Iris synechiae, bilateral 01/29/2021     Priority: Medium     Added automatically from request for surgery [id number]      Dehydration 12/15/2020     Priority: Medium    Chemotherapy induced  nausea and vomiting 12/15/2020     Priority: Medium    Sinus tachycardia 12/15/2020     Priority: Medium    Drug-induced nausea and vomiting 12/15/2020     Priority: Medium    Chemotherapy-induced neutropenia (H) 11/10/2020     Priority: Medium    Pulmonary embolism without acute cor pulmonale, unspecified chronicity, unspecified pulmonary embolism type (H) 08/17/2020     Priority: Medium    Hypokalemia 07/28/2020     Priority: Medium    Bone metastases 10/22/2019     Priority: Medium    Metastatic breast cancer 09/18/2019     Priority: Medium    Metastatic disease (H) 08/31/2019     Priority: Medium    S/P breast reconstruction, bilateral 07/31/2018     Priority: Medium    Anxiety and depression 10/25/2017     Priority: Medium    Hx of breast cancer 10/25/2017     Priority: Medium    Major depressive disorder, recurrent episode, moderate (H) 04/07/2016     Priority: Medium    Anxiety 03/12/2016     Priority: Medium    Migraine without aura and without status migrainosus, not intractable 10/23/2015     Priority: Medium    Obesity, Class II, BMI 35-39.9 10/23/2015     Priority: Medium    Multiple sclerosis (H) 08/11/2015     Priority: Medium    CARDIOVASCULAR SCREENING; LDL GOAL LESS THAN 160 08/11/2015     Priority: Medium    Raynaud's syndrome 08/11/2015     Priority: Medium    Breast cancer (H) 08/11/2015     Priority: Medium     Age 44      Complication of anesthesia 08/11/2015     Priority: Medium    Arthritis 08/11/2015     Priority: Medium    Autoimmune disorder (H) 08/11/2015     Priority: Medium    Other insomnia 08/11/2015     Priority: Medium    Medication management contract agreement 08/11/2015     Priority: Medium     Ambien 12.5 mg, dispense 30 per month, follow up every 6 months       Neurogenic bladder 12/22/2014     Priority: Medium    Vision changes 12/22/2014     Priority: Medium    Demyelinating disorder (H) 12/22/2014     Priority: Medium    Weakness 11/20/2014     Priority: Medium    GERD  (gastroesophageal reflux disease) 10/22/2014     Priority: Medium    History of breast cancer 10/22/2014     Priority: Medium     Overview:   stage 3, diagnosed in 2006 s/p double mastectomy, chemo and radiation.       Migraine 10/22/2014     Priority: Medium       Past Surgical History:  Past Surgical History:   Procedure Laterality Date    CHOLECYSTECTOMY      ENDOBRONCHIAL ULTRASOUND FLEXIBLE N/A 09/05/2019    Procedure: Flexible Bronchoscopy, Endobronchial Ultrasound, Radial Probe;  Surgeon: Sree Sanchez MD;  Location: [facility] OR     REMOVAL OF OVARY/TUBE(S)      HERNIA REPAIR, UMBILICAL      mastectomy  Bilateral 2006    MASTECTOMY, BILATERAL      PHACOEMULSIFICATION CLEAR CORNEA WITH TORIC INTRAOCULAR LENS IMPLANT Left 2/8/2021    Procedure: PHACOEMULSIFICATION, COMPLEX CATARACT, WITH INTRAOCULAR LENS IMPLANT, RESIDENT TORIC LENS LEFT;  Surgeon: Luis Barkley MD;  Location: [facility] OR    PHACOEMULSIFICATION CLEAR CORNEA WITH TORIC INTRAOCULAR LENS IMPLANT Right 3/8/2021    Procedure: PHACOEMULSIFICATION, CATARACT, WITH INTRAOCULAR LENS IMPLANT, TORIC LENS RIGHT;  Surgeon: Luis Barkley MD;  Location: [facility] OR     Medications:  Current Outpatient Medications   Medication Sig Dispense Refill    apixaban ANTICOAGULANT (ELIQUIS ANTICOAGULANT) 5 MG tablet Take 1 tablet (5 mg) by mouth 2 times daily 60 tablet 11    Buprenorphine HCl (BELBUCA) 450 MCG FILM buccal film Place 1 Film (450 mcg) inside cheek every 12 hours 60 each 0    busPIRone (BUSPAR) 15 MG tablet Take 1 tablet (15 mg) by mouth 2 times daily 180 tablet 0    calcium citrate-vitamin D (CITRACAL) 315-250 MG-UNIT TABS Take 1 tablet by mouth every morning       cephALEXin (KEFLEX) 500 MG capsule Take 1 capsule (500 mg) by mouth 4 times daily 40 capsule 0    Cholecalciferol (VITAMIN D3 PO) Take 2,000 Units by mouth every morning       cyclobenzaprine (FLEXERIL) 5 MG tablet Take 1 tablet (5 mg) by mouth 3 times daily as needed for muscle  spasms 30 tablet 1    galcanezumab-gnlm (EMGALITY) 120 MG/ML injection Inject 240 mg subcutaneous first month and then inject 120 mg subcutaneous every 28 days (Patient taking differently: Inject 120 mg Subcutaneous every 28 days) 2 mL 11    LORazepam (ATIVAN) 0.5 MG tablet Take 1-2 tablets (0.5-1 mg) by mouth every 6 hours as needed for anxiety 6 tablet 0    metoclopramide (REGLAN) 5 MG tablet Take 1-2 tablets (5-10 mg) by mouth every 6 hours as needed (nausea/vomiting/migraine) 20 tablet 5    morphine (MSIR) 15 MG IR tablet Take 1-2 tablets (15-30 mg) by mouth every 3 hours as needed for pain 120 tablet 0    OLANZapine (ZYPREXA) 5 MG tablet Take 2.5-5 mg by mouth 2 times daily as needed for nausea      ondansetron (ZOFRAN ODT) 4 MG ODT tab Take 1 tablet (4 mg) by mouth every 8 hours as needed for nausea 10 tablet 0    propranolol ER (INDERAL LA) 80 MG 24 hr capsule Take 160 mg by mouth At Bedtime And 80 mg every morning      propranolol ER (INDERAL LA) 80 MG 24 hr capsule Take 1 capsule by mouth in the morning and 2 capsules by mouth at night if tolerated (Patient taking differently: Take 80 mg by mouth every morning  and 160 mg by mouth at night if tolerated) 270 capsule 3    traZODone (DESYREL) 50 MG tablet Take 1 tablet (50 mg) by mouth At Bedtime 30 tablet 2    ubrogepant (UBRELVY) 100 MG tablet Take 1 tablet (100 mg) by mouth at onset of headache (may repeat in 2 hours as needed. Max 200 mg in 24 hours.) 16 tablet 11    venlafaxine (EFFEXOR XR) 75 MG 24 hr capsule Take 3 capsules (225 mg) by mouth daily 270 capsule 3    vitamin B-2 (RIBOFLAVIN) 25 MG TABS tablet Take 1 tablet by mouth daily      ZOLMitriptan (ZOMIG) 5 MG nasal spray Spray 1 spray in nostril at onset of headache for migraine May repeat in 2 hours. Max 2 sprays/24 hours. 12 each 11     Allergies:     Allergies   Allergen Reactions    Bactrim [Sulfamethoxazole-Trimethoprim] Other (See Comments)     Internal bleeding    Copaxone [Glatiramer]  Hives     Social History:  Home situation: lives with family  Occupation/Schooling: not currently working  Tobacco use: denies  Alcohol use: nirmala  Drug use: nirmala  History of chemical dependency treatment: denie    Family history:  Family History   Problem Relation Age of Onset    Breast Cancer Maternal Aunt 50         at 85    Breast Cancer Cousin 40    Breast Cancer Mother 49        2nd primary, contralateral breast at 69;  at 86    Melanoma Mother     Breast Cancer Maternal Grandmother 40    Ovarian Cancer Maternal Grandmother     Breast Cancer Paternal Grandmother 50         at 60    Brain Cancer Cousin 20    Breast Cancer Paternal Aunt 50         at 60    Breast Cancer Cousin 45        paternal cousin    Anesthesia Reaction No family hx of     Deep Vein Thrombosis No family hx of      Family history of headaches:     Review of Systems:    POSTIVE IN BOLD  GENERAL: fever/chills, fatigue, general unwell feeling, weight gain/loss.  HEAD/EYES:  headache, dizziness, or vision changes.    EARS/NOSE/THROAT:  Nosebleeds, hearing loss, sinus infection, earache, tinnitus.  IMMUNE:  Allergies, cancer, immune deficiency, or infections.  SKIN:  Urticaria, rash, hives  HEME/Lymphatic:   anemia, easy bruising, easy bleeding.  RESPIRATORY:  cough, wheezing, or shortness of breath  CARDIOVASCULAR/Circulation:  Extremity edema, syncope, hypertension, tachycardia, or angina.  GASTROINTESTINAL:  abdominal pain, nausea/emesis, diarrhea, constipation,  hematochezia, or melena.  ENDOCRINE:  Diabetes, steroid use,  thyroid disease or osteoporosis.  MUSCULOSKELETAL: neck pain, back pain, arthralgia, arthritis, or gout.  GENITOURINARY:  frequency, urgency, dysuria, difficulty voiding, hematuria or incontinence.  NEUROLOGIC:  weakness, numbness, paresthesias, seizure, tremor, stroke or memory loss.  PSYCHIATRIC:  depression, anxiety, stress, suicidal thoughts or mood swings.     Physical Exam:  Vitals:    23 0752  "  BP: 119/82   BP Location: Left arm   Patient Position: Chair   Cuff Size: Adult Regular   Pulse: 66   SpO2: 99%   Weight: 59.4 kg (131 lb)   Height: 1.575 m (5' 2\")     Exam:  Constitutional: healthy, alert, and no distress  Head: normocephalic. Atraumatic.   Eyes: no redness or jaundice noted   ENT: oropharnx normal.  MMM.  Neck supple.    Cardiovascular: RRR no m/g/r   Respiratory: clear   Gastrointestinal: soft, non-tender, normoactive bowel sounds   : deferred  Skin: no suspicious lesions or rashes  Psychiatric: mentation appears normal and affect normal/bright    Musculoskeletal exam:  Gait/Station/Posture: antalgic    Lumbar spine: paraspinal tenderness, decreased ROM with flexion and extension   Rotation/ext to right: painful    Rotation/ext to left: painful     Myofascial tenderness:  negative  Straight leg exam: negative  Lauro's maneuver: Positive bilaterally  Santos's finger positive bilaterally  Sacral distraction positive  Gaenslen's test positive bilaterally    Neurologic exam:  CN:  Cranial nerves 2-12 are normal  Motor:  5/5 UE and LE strength  Reflexes:      Sensory:  (upper and lower extremities):   Light touch: normal    Allodynia: absent    Dysethesia: absent    Hyperalgesia: absent     Diagnostic tests:  MRI of lumbar spine was completed on 7/31/2023 showing:  \"IMPRESSION:  1.  Extensive osseous metastases as well as chronic pathologic fractures at L1, L3, and L4, as detailed.  2.  Partially visualized asymmetric signal abnormality in the left sacral ala may represent confluent pathologic marrow replacement. Marrow edema related to sacral fracture is difficult to exclude in the appropriate clinical context.  3.  Posterior paraspinous muscular edema can be compatible with posttraumatic injury/strain.  4.  Multilevel degenerative changes, as detailed.\"    Personally reviewed imaging on day of visit    Other testing (labs, diagnostics) reviewed:  Labs  Last Comprehensive Metabolic Panel:  Lab " Results   Component Value Date     09/15/2023    POTASSIUM 4.2 09/15/2023    CHLORIDE 101 09/15/2023    CO2 25 09/15/2023    ANIONGAP 11 09/15/2023    GLC 94 09/15/2023    BUN 10.5 09/15/2023    CR 0.75 09/15/2023    GFRESTIMATED 90 09/15/2023    RAFAELA 9.0 09/15/2023       Lab Results   Component Value Date    WBC 4.7 09/15/2023    WBC 4.5 07/07/2021     Lab Results   Component Value Date    RBC 4.23 09/15/2023    RBC 4.29 07/07/2021     Lab Results   Component Value Date    HGB 12.8 09/15/2023    HGB 12.4 07/07/2021     Lab Results   Component Value Date    HCT 38.3 09/15/2023    HCT 37.6 07/07/2021     Lab Results   Component Value Date    MCV 91 09/15/2023    MCV 88 07/07/2021     Lab Results   Component Value Date    MCH 30.3 09/15/2023    MCH 28.9 07/07/2021     Lab Results   Component Value Date    MCHC 33.4 09/15/2023    MCHC 33.0 07/07/2021     Lab Results   Component Value Date    RDW 14.6 09/15/2023    RDW 14.8 07/07/2021     Lab Results   Component Value Date     09/15/2023     07/07/2021     Lab Results   Component Value Date    A1C 5.6 06/10/2022    A1C 5.4 12/29/2016       EKG    [place] Prescription Monitoring Program reviewed on day of visit - appropriate    Outside records reviewed via [facility]    Assessment:  Sacroiliitis  Metastatic breast cancer  Polyneuropathy  Multiple Sclerosis    Shaneka Alvarez is a 63 year old female who presents with the complaints of chronic low back pain that was previously diagnosed prior to this visit as sacroiliitis. Indeed based on her history and physical exam, her symptoms are consistent with sacroiliitis. Due to her history of metastatic breast cancer, we discussed the risks of an injection including bleeding, infection, side effects of steroids, etc. She demonstrated understanding and expressed interest in proceeding with the intervention.     Plan:  Diagnosis reviewed, treatment option addressed, and risk/benefits discussed.  Self-care  instructions given.  I am recommending a multidisciplinary treatment plan to help this patient better manage her pain.      Physical Therapy: Continue as tolerated daily HEPs  Pain Psychologist to address issues of relaxation, behavioral change, coping style, and other factors important to improvement: not at this time  Diagnostic Studies: none  Medication Management: continue current regimen  Further procedures recommended: Bilateral Sacroiliac Joint Injection - will need to have platelet level checked day of procedure.  Recommendations/follow-up for PCP:  none    Follow up: 6 weeks after procedure    Total time spent was 50 minutes, and more than 50% of face to face time was spent in counseling and/or coordination of care regarding principles of multidisciplinary care, medication management, and therapeutic options.       Answers submitted by the patient for this visit:  BING-7 (Submitted on 9/12/2023)  BING 7 TOTAL SCORE: 2          Again, thank you for allowing me to participate in the care of your patient.      Sincerely,    Natalie Yusuf MD

## 2023-09-13 NOTE — PROGRESS NOTES
[facility] Consultation    Date of visit: 9/13/2023    Reason for consultation:    Shaneka Alvarez is a 63 year old female who is seen in consultation today at the request of her provider, Alva Whitaker.    Primary Care Provider is [place], [facility].  Pain medications are being prescribed by PCP.    Please see the [facility] health questionnaire which the patient completed and reviewed with me in detail.    Chief Complaint:    No chief complaint on file.      Pain history:  Shaneka Alvarez is a 63 year old female who first started having problems with pain in the lower back that has been previously diagnosed as sacroiliitis. Standing is the worst and walking is also painful. SItting is tolerable but requires frequent repositioning. The pain has been present for four years and is associated with metastatic breast cancer. She uses a cane sometimes, and feels that she needs to use it more as she feels that her legs buckle. She has done PT and the stretches are helpful.     The pain wakes her up at night. She points directly to the PSIS on both sides of her low back.       Pain rating: intensity ranges from 2/10 to 7/10, and Averages 7/10 on a 0-10 scale.  Aggravating factors include: standing, walking  Relieving factors include: repositioning  Any bowel or bladder incontinence: nirmala    Current treatments include:  Belbuca  Flexeril  MSIR 15-30 mg TID  Venlafaxine    Previous medication treatments included:      Other treatments have included:  Shaneka Alvarez has not been seen at a pain clinic in the past.    PT: yes  Acupuncture: yes  TENs Unit: yes  Injections: yes    Past Medical History:  Past Medical History:   Diagnosis Date    Breast cancer (H)     Age 44    Cataract     left eye    Chemotherapy induced nausea and vomiting 12/15/2020    Common migraine     Esophageal reflux     H/O bilateral mastectomy     Mild major depression  (H)     Multiple sclerosis (H)     Pulmonary embolism (H)      Patient Active Problem List    Diagnosis Date Noted    Intractable pain 07/31/2023     Priority: Medium    Fall, initial encounter 07/31/2023     Priority: Medium    Left-sided low back pain with left-sided sciatica 04/05/2023     Priority: Medium    SI (sacroiliac) joint dysfunction 04/05/2023     Priority: Medium    Chronic bilateral low back pain without sciatica 04/05/2023     Priority: Medium    Elevated serum creatinine 03/18/2022     Priority: Medium    Hyponatremia 03/17/2022     Priority: Medium    Vomiting 03/17/2022     Priority: Medium    Bony metastasis 03/17/2022     Priority: Medium    Acute kidney injury (H) 03/17/2022     Priority: Medium    Sepsis without acute organ dysfunction (H)-possible  03/17/2022     Priority: Medium    Closed fracture of pedicle of lumbar vertebra, initial encounter (H) 03/17/2022     Priority: Medium    Acute midline low back pain, unspecified whether sciatica present 03/17/2022     Priority: Medium    Anemia, unspecified type 10/18/2021     Priority: Medium    Hyperphosphatemia 07/28/2021     Priority: Medium    Drug-induced polyneuropathy (H) 02/18/2021     Priority: Medium    Cancer associated pain 02/03/2021     Priority: Medium    Posterior subcapsular polar age-related cataract of both eyes 01/29/2021     Priority: Medium     Added automatically from request for surgery [id number]      Iris synechiae, bilateral 01/29/2021     Priority: Medium     Added automatically from request for surgery [id number]      Dehydration 12/15/2020     Priority: Medium    Chemotherapy induced nausea and vomiting 12/15/2020     Priority: Medium    Sinus tachycardia 12/15/2020     Priority: Medium    Drug-induced nausea and vomiting 12/15/2020     Priority: Medium    Chemotherapy-induced neutropenia (H) 11/10/2020     Priority: Medium    Pulmonary embolism without acute cor pulmonale, unspecified chronicity, unspecified pulmonary  embolism type (H) 08/17/2020     Priority: Medium    Hypokalemia 07/28/2020     Priority: Medium    Bone metastases 10/22/2019     Priority: Medium    Metastatic breast cancer 09/18/2019     Priority: Medium    Metastatic disease (H) 08/31/2019     Priority: Medium    S/P breast reconstruction, bilateral 07/31/2018     Priority: Medium    Anxiety and depression 10/25/2017     Priority: Medium    Hx of breast cancer 10/25/2017     Priority: Medium    Major depressive disorder, recurrent episode, moderate (H) 04/07/2016     Priority: Medium    Anxiety 03/12/2016     Priority: Medium    Migraine without aura and without status migrainosus, not intractable 10/23/2015     Priority: Medium    Obesity, Class II, BMI 35-39.9 10/23/2015     Priority: Medium    Multiple sclerosis (H) 08/11/2015     Priority: Medium    CARDIOVASCULAR SCREENING; LDL GOAL LESS THAN 160 08/11/2015     Priority: Medium    Raynaud's syndrome 08/11/2015     Priority: Medium    Breast cancer (H) 08/11/2015     Priority: Medium     Age 44      Complication of anesthesia 08/11/2015     Priority: Medium    Arthritis 08/11/2015     Priority: Medium    Autoimmune disorder (H) 08/11/2015     Priority: Medium    Other insomnia 08/11/2015     Priority: Medium    Medication management contract agreement 08/11/2015     Priority: Medium     Ambien 12.5 mg, dispense 30 per month, follow up every 6 months       Neurogenic bladder 12/22/2014     Priority: Medium    Vision changes 12/22/2014     Priority: Medium    Demyelinating disorder (H) 12/22/2014     Priority: Medium    Weakness 11/20/2014     Priority: Medium    GERD (gastroesophageal reflux disease) 10/22/2014     Priority: Medium    History of breast cancer 10/22/2014     Priority: Medium     Overview:   stage 3, diagnosed in 2006 s/p double mastectomy, chemo and radiation.       Migraine 10/22/2014     Priority: Medium       Past Surgical History:  Past Surgical History:   Procedure Laterality Date     CHOLECYSTECTOMY      ENDOBRONCHIAL ULTRASOUND FLEXIBLE N/A 09/05/2019    Procedure: Flexible Bronchoscopy, Endobronchial Ultrasound, Radial Probe;  Surgeon: Sree Sanchez MD;  Location: [facility] OR     REMOVAL OF OVARY/TUBE(S)      HERNIA REPAIR, UMBILICAL      mastectomy  Bilateral 2006    MASTECTOMY, BILATERAL      PHACOEMULSIFICATION CLEAR CORNEA WITH TORIC INTRAOCULAR LENS IMPLANT Left 2/8/2021    Procedure: PHACOEMULSIFICATION, COMPLEX CATARACT, WITH INTRAOCULAR LENS IMPLANT, RESIDENT TORIC LENS LEFT;  Surgeon: Luis Barkley MD;  Location: [facility] OR    PHACOEMULSIFICATION CLEAR CORNEA WITH TORIC INTRAOCULAR LENS IMPLANT Right 3/8/2021    Procedure: PHACOEMULSIFICATION, CATARACT, WITH INTRAOCULAR LENS IMPLANT, TORIC LENS RIGHT;  Surgeon: Luis Barkley MD;  Location: [facility] [place] [place] OR     Medications:  Current Outpatient Medications   Medication Sig Dispense Refill    apixaban ANTICOAGULANT (ELIQUIS ANTICOAGULANT) 5 MG tablet Take 1 tablet (5 mg) by mouth 2 times daily 60 tablet 11    Buprenorphine HCl (BELBUCA) 450 MCG FILM buccal film Place 1 Film (450 mcg) inside cheek every 12 hours 60 each 0    busPIRone (BUSPAR) 15 MG tablet Take 1 tablet (15 mg) by mouth 2 times daily 180 tablet 0    calcium citrate-vitamin D (CITRACAL) 315-250 MG-UNIT TABS Take 1 tablet by mouth every morning       cephALEXin (KEFLEX) 500 MG capsule Take 1 capsule (500 mg) by mouth 4 times daily 40 capsule 0    Cholecalciferol (VITAMIN D3 PO) Take 2,000 Units by mouth every morning       cyclobenzaprine (FLEXERIL) 5 MG tablet Take 1 tablet (5 mg) by mouth 3 times daily as needed for muscle spasms 30 tablet 1    galcanezumab-gnlm (EMGALITY) 120 MG/ML injection Inject 240 mg subcutaneous first month and then inject 120 mg subcutaneous every 28 days (Patient taking differently: Inject 120 mg Subcutaneous every 28 days) 2 mL 11    LORazepam (ATIVAN) 0.5 MG tablet Take 1-2 tablets (0.5-1 mg) by mouth every 6 hours as needed for  anxiety 6 tablet 0    metoclopramide (REGLAN) 5 MG tablet Take 1-2 tablets (5-10 mg) by mouth every 6 hours as needed (nausea/vomiting/migraine) 20 tablet 5    morphine (MSIR) 15 MG IR tablet Take 1-2 tablets (15-30 mg) by mouth every 3 hours as needed for pain 120 tablet 0    OLANZapine (ZYPREXA) 5 MG tablet Take 2.5-5 mg by mouth 2 times daily as needed for nausea      ondansetron (ZOFRAN ODT) 4 MG ODT tab Take 1 tablet (4 mg) by mouth every 8 hours as needed for nausea 10 tablet 0    propranolol ER (INDERAL LA) 80 MG 24 hr capsule Take 160 mg by mouth At Bedtime And 80 mg every morning      propranolol ER (INDERAL LA) 80 MG 24 hr capsule Take 1 capsule by mouth in the morning and 2 capsules by mouth at night if tolerated (Patient taking differently: Take 80 mg by mouth every morning  and 160 mg by mouth at night if tolerated) 270 capsule 3    traZODone (DESYREL) 50 MG tablet Take 1 tablet (50 mg) by mouth At Bedtime 30 tablet 2    ubrogepant (UBRELVY) 100 MG tablet Take 1 tablet (100 mg) by mouth at onset of headache (may repeat in 2 hours as needed. Max 200 mg in 24 hours.) 16 tablet 11    venlafaxine (EFFEXOR XR) 75 MG 24 hr capsule Take 3 capsules (225 mg) by mouth daily 270 capsule 3    vitamin B-2 (RIBOFLAVIN) 25 MG TABS tablet Take 1 tablet by mouth daily      ZOLMitriptan (ZOMIG) 5 MG nasal spray Spray 1 spray in nostril at onset of headache for migraine May repeat in 2 hours. Max 2 sprays/24 hours. 12 each 11     Allergies:     Allergies   Allergen Reactions    Bactrim [Sulfamethoxazole-Trimethoprim] Other (See Comments)     Internal bleeding    Copaxone [Glatiramer] Hives     Social History:  Home situation: lives with family  Occupation/Schooling: not currently working  Tobacco use: denies  Alcohol use: nirmala  Drug use: nirmala  History of chemical dependency treatment: denie    Family history:  Family History   Problem Relation Age of Onset    Breast Cancer Maternal Aunt 50         at 85    Breast  "Cancer Cousin 40    Breast Cancer Mother 49        2nd primary, contralateral breast at 69;  at 86    Melanoma Mother     Breast Cancer Maternal Grandmother 40    Ovarian Cancer Maternal Grandmother     Breast Cancer Paternal Grandmother 50         at 60    Brain Cancer Cousin 20    Breast Cancer Paternal Aunt 50         at 60    Breast Cancer Cousin 45        paternal cousin    Anesthesia Reaction No family hx of     Deep Vein Thrombosis No family hx of      Family history of headaches:     Review of Systems:    POSTIVE IN BOLD  GENERAL: fever/chills, fatigue, general unwell feeling, weight gain/loss.  HEAD/EYES:  headache, dizziness, or vision changes.    EARS/NOSE/THROAT:  Nosebleeds, hearing loss, sinus infection, earache, tinnitus.  IMMUNE:  Allergies, cancer, immune deficiency, or infections.  SKIN:  Urticaria, rash, hives  HEME/Lymphatic:   anemia, easy bruising, easy bleeding.  RESPIRATORY:  cough, wheezing, or shortness of breath  CARDIOVASCULAR/Circulation:  Extremity edema, syncope, hypertension, tachycardia, or angina.  GASTROINTESTINAL:  abdominal pain, nausea/emesis, diarrhea, constipation,  hematochezia, or melena.  ENDOCRINE:  Diabetes, steroid use,  thyroid disease or osteoporosis.  MUSCULOSKELETAL: neck pain, back pain, arthralgia, arthritis, or gout.  GENITOURINARY:  frequency, urgency, dysuria, difficulty voiding, hematuria or incontinence.  NEUROLOGIC:  weakness, numbness, paresthesias, seizure, tremor, stroke or memory loss.  PSYCHIATRIC:  depression, anxiety, stress, suicidal thoughts or mood swings.     Physical Exam:  Vitals:    23 0752   BP: 119/82   BP Location: Left arm   Patient Position: Chair   Cuff Size: Adult Regular   Pulse: 66   SpO2: 99%   Weight: 59.4 kg (131 lb)   Height: 1.575 m (5' 2\")     Exam:  Constitutional: healthy, alert, and no distress  Head: normocephalic. Atraumatic.   Eyes: no redness or jaundice noted   ENT: oropharnx normal.  MMM.  Neck supple.  " "  Cardiovascular: RRR no m/g/r   Respiratory: clear   Gastrointestinal: soft, non-tender, normoactive bowel sounds   : deferred  Skin: no suspicious lesions or rashes  Psychiatric: mentation appears normal and affect normal/bright    Musculoskeletal exam:  Gait/Station/Posture: antalgic    Lumbar spine: paraspinal tenderness, decreased ROM with flexion and extension   Rotation/ext to right: painful    Rotation/ext to left: painful     Myofascial tenderness:  negative  Straight leg exam: negative  Lauro's maneuver: Positive bilaterally  Santos's finger positive bilaterally  Sacral distraction positive  Gaenslen's test positive bilaterally    Neurologic exam:  CN:  Cranial nerves 2-12 are normal  Motor:  5/5 UE and LE strength  Reflexes:      Sensory:  (upper and lower extremities):   Light touch: normal    Allodynia: absent    Dysethesia: absent    Hyperalgesia: absent     Diagnostic tests:  MRI of lumbar spine was completed on 7/31/2023 showing:  \"IMPRESSION:  1.  Extensive osseous metastases as well as chronic pathologic fractures at L1, L3, and L4, as detailed.  2.  Partially visualized asymmetric signal abnormality in the left sacral ala may represent confluent pathologic marrow replacement. Marrow edema related to sacral fracture is difficult to exclude in the appropriate clinical context.  3.  Posterior paraspinous muscular edema can be compatible with posttraumatic injury/strain.  4.  Multilevel degenerative changes, as detailed.\"    Personally reviewed imaging on day of visit    Other testing (labs, diagnostics) reviewed:  Labs  Last Comprehensive Metabolic Panel:  Lab Results   Component Value Date     09/15/2023    POTASSIUM 4.2 09/15/2023    CHLORIDE 101 09/15/2023    CO2 25 09/15/2023    ANIONGAP 11 09/15/2023    GLC 94 09/15/2023    BUN 10.5 09/15/2023    CR 0.75 09/15/2023    GFRESTIMATED 90 09/15/2023    RAFAELA 9.0 09/15/2023       Lab Results   Component Value Date    WBC 4.7 09/15/2023    WBC " 4.5 07/07/2021     Lab Results   Component Value Date    RBC 4.23 09/15/2023    RBC 4.29 07/07/2021     Lab Results   Component Value Date    HGB 12.8 09/15/2023    HGB 12.4 07/07/2021     Lab Results   Component Value Date    HCT 38.3 09/15/2023    HCT 37.6 07/07/2021     Lab Results   Component Value Date    MCV 91 09/15/2023    MCV 88 07/07/2021     Lab Results   Component Value Date    MCH 30.3 09/15/2023    MCH 28.9 07/07/2021     Lab Results   Component Value Date    MCHC 33.4 09/15/2023    MCHC 33.0 07/07/2021     Lab Results   Component Value Date    RDW 14.6 09/15/2023    RDW 14.8 07/07/2021     Lab Results   Component Value Date     09/15/2023     07/07/2021     Lab Results   Component Value Date    A1C 5.6 06/10/2022    A1C 5.4 12/29/2016       EKG    [place] Prescription Monitoring Program reviewed on day of visit - appropriate    Outside records reviewed via [facility]    Assessment:  Sacroiliitis  Metastatic breast cancer  Polyneuropathy  Multiple Sclerosis    Shaneka Alvarez is a 63 year old female who presents with the complaints of chronic low back pain that was previously diagnosed prior to this visit as sacroiliitis. Indeed based on her history and physical exam, her symptoms are consistent with sacroiliitis. Due to her history of metastatic breast cancer, we discussed the risks of an injection including bleeding, infection, side effects of steroids, etc. She demonstrated understanding and expressed interest in proceeding with the intervention.     Plan:  Diagnosis reviewed, treatment option addressed, and risk/benefits discussed.  Self-care instructions given.  I am recommending a multidisciplinary treatment plan to help this patient better manage her pain.      Physical Therapy: Continue as tolerated daily HEPs  Pain Psychologist to address issues of relaxation, behavioral change, coping style, and other factors important to improvement: not at this time  Diagnostic Studies:  none  Medication Management: continue current regimen  Further procedures recommended: Bilateral Sacroiliac Joint Injection - will need to have platelet level checked day of procedure.  Recommendations/follow-up for PCP:  none    Follow up: 6 weeks after procedure    Total time spent was 50 minutes, and more than 50% of face to face time was spent in counseling and/or coordination of care regarding principles of multidisciplinary care, medication management, and therapeutic options.     Natalie Yusuf MD  Assistant Medical Director  [facility]  [facility]      Answers submitted by the patient for this visit:  BING-7 (Submitted on 9/12/2023)  BING 7 TOTAL SCORE: 2

## 2023-09-13 NOTE — PATIENT INSTRUCTIONS
Procedures:    Call to schedule your procedure: [phone] option #2    Bilateral SI Joint Injection- CBC lab appointment before procedure    Your pre-procedure instructions are below, please call our clinic if you have any questions.        Recommended Follow up:      Follow up 6-8 weeks after procedure.       Please call [phone] to schedule your clinic appointment if you don't already have an appointment scheduled.      Procedure Information related to COVID-19     Please call [phone] option #2 to schedule, reschedule, or cancel your procedure appointment.   Phones are answered Monday - Friday from 08:00 - 4:30pm.  Leave a voicemail with your name, birth date, and phone number if no one is available to take your call.        You no longer need to test for COVID- 19 prior to your procedure/surgery, unless your physician specifically requests that you test. If you experience COVID symptoms or have tested positive for COVID-19 within 14 days of your scheduled surgery or procedure, please update our office right away and your procedure may have to be postponed.       The procedure center staff will call you several days before the procedure to review important information that you will need to know for the day of the procedure.     Please contact the clinic if you have further questions about this information [phone].        Information related to Scheduling and Pre-Procedure Instructions:    If you must reschedule your procedure more than two times, you must follow up in clinic before rescheduling again.    Preparing for your procedure    CAUTION - FAILURE TO FOLLOW THESE PRE-PROCEDURE INSTRUCTIONS WILL RESULT IN YOUR PROCEDURE BEING RESCHEDULED.    Your Procedure: Bilateral SI Joint Injection            You must have a  take you home after your procedure. Transportation by taxi or para-transit is okay as long as you have a responsible adult accompany you. You must provide your  's full name and contact number at time of check in.     Fasting Protocol Please have nothing to eat or drink 1 hour prior to arrival.     Medications If you take any medications, DO NOT STOP. Take your medications as usual the day of your procedure with a sip of water AT LEAST 2 HOURS PRIOR TO ARRIVAL.    Antibiotics If you are currently taking antibiotics, you must complete the entire dose 7 days prior to your scheduled procedure. You must be clear of any signs or symptoms of infection. If you begin antibiotics, please contact our clinic for instructions.     Fever, Chills, or Rash If you experience a fever of higher than 100 degrees, chills, rash, or open wounds during the one week before your procedure, please call the clinic to see if you may proceed with your procedure.      Medication Hold List  **Patients under Cardiology/Neurology care should consult their provider prior to the pain procedure to verify pre-procedure medication instructions. The information below contains general guidelines.**      You do not have to hold Eliquis for this procedure.    Blood Thinners If you are taking daily ASPIRIN, PLAVIX, OR OTHER BLOOD THINNERS SUCH AS COUMADIN/WARFARIN, we will need your prescribing doctor to sign a release permitting you to stop these medications. Once approved by your prescribing doctor - STOP ALL BLOOD THINNERS BASED ON THE TIME TABLE BELOW PRIOR TO YOUR PROCEDURE. If you have been instructed to stop WARFARIN(COUMADIN), you must have an INR lab drawn the day before your procedure. Your INR must be within normal limits before we can perform your injection. MEDICATIONS CAN BE RESTARTED AFTER YOUR PROCEDURE.    24 HOUR HOLD  Lovenox (enoxaparin)  Agrylin (Anagrelide)    3 DAY HOLD  Xarelto (rivaroxaban)    5 DAY HOLD  Coumadin (Warfarin)  Brilinta (ticagrelor) 7 DAY HOLD  Anacin, Bufferin, Ecotrin, Excedrin, Aggrenox (Aspirin)  Pradexa (Dabigatran)  Elmiron (Pentosan)  Plavix (Clopidogrel  Bisulfate)  Pletal (Cilostazol)    10 DAY HOLD  Effient (Prasugel)    14 DAY HOLD  Ticlid (ticlopidine)        Non-steroidal Anti-inflammatories (NSAIDs) DO NOT TAKE any non-steroidal anti-inflammatory medications (NSAIDs) listed on the table below. MEDICATIONS CAN BE RESTARTED AFTER YOUR PROCEDURE. Celebrex is OK to take and does not need to be discontinued.     Medications to stop:  1 DAY HOLD  Advil, Motrin (Ibuprofen)  Voltaren (Diclofenac)  Toradol (Ketorolac)    3 DAY HOLD  Arthrotec (diciofenac sodium/misoprostol)  Clinoril (Sulindac)  Indocin (Indomethacin)  Lodine (Etodolac)  Vicoprofen (Hydrocodone and Ibuprofen)  Apixaban (Eliquis)    4 DAY HOLD  Mobic (Meloxicam)  Naprosyn (Naproxen)   7 DAY HOLD  Aleve (Naproxen sodium)  Darvon compound (contains aspirin)  Norgesic Forte (contains aspirin)  Oruvall (Ketoprofen)  Percodan (contains aspirin)  Relafen (Nabumetone)  Salsalate  Trilisate  Vitamin E (more than 400 mg per day)  Any medication containing aspirin    14 DAY HOLD  Daypro (Oxaprozin)  Feldene (Piroxicam)            To speak with a nurse, schedule/reschedule/cancel a clinic appointment, or request a medication refill call: [phone]    You can also reach us by "[employer]: [email or web address]

## 2023-09-13 NOTE — TELEPHONE ENCOUNTER
Patient is scheduled for surgery with Dr. Yusuf    Spoke with: patient    Date of Surgery: 10/13/23    Location: [facility]    Informed patient they will need an adult  yes    Additional comments: Patient is aware of date and time of the procedure.        Kourtney Villa MA on 9/13/2023 at 9:02 AM

## 2023-09-13 NOTE — NURSING NOTE
RN read through the instructions with the patient for the recommended procedure: Bilateral SI Joint Injection  Patient verbalized understanding to holding appropriate medication per protocol and was agreeable to NPO policy and needing a .    Anticoagulant: Eliquis- no hold needed per provider    Recommended Follow Up:  6-8 weeks after procedure    Nila Coleman RN

## 2023-09-13 NOTE — NURSING NOTE
No chief complaint on file.      Severe Pain (7)     Pain Medications       Opioid Agonists Refills Start End     morphine (MSIR) 15 MG IR tablet    0 9/7/2022     Sig - Route: Take 1-2 tablets (15-30 mg) by mouth every 3 hours as needed for pain - Oral    Class: E-Prescribe    Earliest Fill Date: 9/7/2022      Opioid Partial Agonists Refills Start End     Buprenorphine HCl (BELBUCA) 450 MCG FILM buccal film    0 9/6/2023     Sig - Route: Place 1 Film (450 mcg) inside cheek every 12 hours - Buccal    Class: E-Prescribe    Prior authorization: Closed - Other            What medications are you using for pain? Belbuca, Morphine    (New patients only) Have you been seen by another pain clinic/ provider? yes    (Return Patients only) What refills are you needing today? No    Expectations Not Sure

## 2023-09-14 NOTE — NURSING NOTE
Is the patient currently in the state of [place]? YES    Visit mode:VIDEO    If the visit is dropped, the patient can be reconnected by: VIDEO VISIT: Text to cell phone:   Telephone Information:   Mobile [phone]       Will anyone else be joining the visit? NO    How would you like to obtain your AVS? MyChart    Are changes needed to the allergy or medication list? Pt stated no changes to allergies and Pt stated no med changes    Reason for visit: ANA Parsons LPN

## 2023-09-14 NOTE — LETTER
9/14/2023         RE: Shaneka Alvarez  [id number] [facility] [id number]        Dear Colleague,    Thank you for referring your patient, Shaneka Alvarez, to the [facility]. Please see a copy of my visit note below.    ONCOLOGY FOLLOW UP NOTE: 9/14/23        I took the history from reviewing the previous notes that she was following with Dr. Amaya.  I have copied and updated from prior notes.    ONCOLOGY History:    1.  January 2006:  Diagnosed with Stage IIB, T2 N1 M0 invasive lobular carcinoma of the right breast.  Final pathology showed a 4.5 x 3.8 x 2.5 cm, 01/14 lymph nodes positive.  Estrogen, progesterone receptor positive, HER-2 negative.     2.  Genetics.  BRCA1 and 2 mutations not detected. 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       THERAPY TO DATE:   1. 2006:  ECOG 2104 protocol of dose-dense Adriamycin, Cytoxan and Avastin x4 cycles followed by Taxol and Avastin x4 cycles.   2.  03/2007:  Completed 1 year Avastin.   3.  11/2006-01/2007:  Radiotherapy to the right breast of 5040 cGy.   4.  [id number]:  Aromasin.  Stopped after moving to the [facility].   5.  01/2016:  Right modified radical mastectomy with latissimus dorsi flap reconstruction and a left prophylactic mastectomy with latissimus dorsi flap reconstruction.     She recently had MRI of the brain as a follow-up for multiple sclerosis and there were multiple calvarial lesions noted which was suspicious for metastatic disease.  There was no evidence of new multiple sclerosis lesions.  She had a PET scan on 8/30/2019 which showed widespread bone metastatic lesions (calvarium, spine, sacrum, pelvis, ribs most prominent at C7, T3, L1 and L4), hypermetabolic 3 cm lesion in LLL, and mediastinal/hilar LN. CEA wnl at 1.9 and C27-29 elevated to 415 (28 on 8/23/16). A CT guided biopsy of the right iliac bone was obtained on 9/4/19, pathology consistent with metastatic adenocarcinoma (breast), ER/TN  negative, HER-2 negative PDL 1 < 1%. A second biopsy was take of the LLL nodule via EBUS on 9/5/19 did not show any malignancy.    C/T/L spine MRI 8/3/19 to 9/2/19 with numerous enhancing lesions of the C, T and L spine, largest around T9 and L1. No evidence of cord compression. Has a L1 compression fracture and impending L4.     Received radiation T12-L5 3000 cGy in 10 fractions from 9/6/2019 till 9/18/2019.  Neurosurgery recommended no surgical intervention but to wear [place] brace when out of bed and HOB >30 degrees    She started palliative Xeloda 2000/1500 on 10/1/2019 but after just a few doses she noticed nausea, red eyes blurred vision, loss of appetite.  She stopped taking it after 5 doses and was seen by nurse practitioner on 10/7/2019 when she was improving.  At that point she was restarted on Xeloda at a lower dose of 1000 mg twice a day.  As she was not able to tolerate even the lower dose with extreme nausea and vomiting and feeling extremely fatigued and blurry vision, we decided on stopping Xeloda.    We decided on repeating scans and MRI brain on 10/25/2019 showed no intracranial metastatic disease.   Multiple enhancing calvarial lesions may be increased in number and are suggestive of metastatic disease. Thinner imaging on the current study may identify the smaller lesions and may be responsible for  identified more lesions.   There is a single focus of T2 hyperintense signal within the anterior right temporal lobe may represent interval demyelination. There is no evidence for active demyelination.    PET/CT on 11/1/2019 showed favorable response to therapy and Overall FDG uptake within scattered osseous metastases is decreased since 8/30/2019, particularly within the pelvis. Increased sclerotic appearance of L1 and L4 pathologic compression deformities, likely sequela of recently completed palliative radiation therapy.    No significant FDG uptake in previously demonstrated hypermetabolic  mediastinal lymph nodes. Biopsy negative on 9/5/2019.  There is also decreased FDG uptake in the left lower lobe (biopsy negative for  malignancy), now with dense consolidation containing air bronchograms suggestive of infection versus aspiration.  There is diffuse FDG uptake within the esophagus, consistent with esophagitis.    Because of intolerance to Xeloda we decided on switching to weekly paclitaxel on 11/5/2019.    C#2 Taxol 12/4/19- started with 70mg/m2 dose reduction    C#3 Taxol 12/30/2019     PET/CT on 1/24/2020 showed progression of the disease in some of the bone lesions including increase in size and lytic lesion within the vertebral body of C4 measuring 1 cm as opposed to 0.6 cm previously and a new central soft tissue nodule with SUV max 4.1 when previously it was non-hypermetabolic.  There is also some progression noted in the right proximal femur and right iliac bone.  There is decreased metabolic uptake in the right lamina of T9 with SUV max 4.7 when previously it was 8.0.  Other lesions are stable.    CA 27-29 also had increased significantly and it was 257 on 1/28/2020.    We decided on switching to eribulin on 1/28/2020.    C#2 2/18/2020  C#2 D#8 2/25/2020    C#3 D#1 3/18/2020 -delayed by 1 week as per patient's preference because she wanted to visit her family.    She had a repeat PET/CT on 3/27/2020 which showed stable size of the bone metastatic lesions although the FDG avidity was less, consistent with treatment response.    She had MRI of the thoracic spine on 4/3/2020 and it was compared to the one which was done in August 2019 and it showed increased size of several metastatic lesions most notably at T9 but also at T5-T7.  Other lesions at T10, T11 and T12 appeared improved.    CA 27-29 was also down to 222 on 3/18/2020.    Cycle #4 eribulin ( dose reduced to 1.2 mg/m2 )-4/7/2020-   Cycle #5 Eribulin ( 1.2 mg/m2 )- 4/28/2020   Cycle #6 Eribulin ( 1.2 mg/m2 )- 5/19/2020     Cycle #7  Eribulin ( 1.2 mg/m2 )- 6/9/2020    Cycle #8 Eribulin ( 1.2 mg/m2 )- 6/30/2020     She had a repeat PET scan on 7/17/2020 which showed incidental finding of new acute bilateral pulmonary embolism in the distal left main pulmonary artery extending in the left upper and lower lobes and in the segmental and branch arteries in the right lower lobe.    It also showed mildly increased FDG avidity in the lytic lesion in the T9 lamina and right pedicle with SUV max 5.3, previously 3.9.  That is also slightly increased FDG uptake to the left anterior fifth rib at the costochondral junction SUV max 3.9, previously 2.5.  There is slightly decreased FDG uptake in the right femoral neck lesion SUV max 2.2, previously 2.9.  FDG uptake in other bone lesions is fairly stable.  No new metastasis are seen.    CA 27-29 was 308 on 6/30/2020.  It was 286 on 5/19/2020.    Overall this is consistent with only slight progression versus stable disease.    She was started on Lovenox.    Cycle #9 eribulin 7/21/2020. CA 27-29 was 238    C#10 eribulin 8/18/2020. ( delayed by one week for ANC 0.9 )    C#11 Eribulin 9/8/2020    C#12 Eribulin 9/29/2020     C#13 Eribulin 10/20/2020     PET scan on 11/6/2020 shows progression of the disease with increased FDG uptake in the lytic lesions in the T9/T10 and right posterolateral elements as well as increased FDG uptake in the previously known hypermetabolic right iliac bone lesion suggesting recurrence of previously treated metastasis.  There is new subtle lesion in the right manubrium.  There is also a new fracture in the anterolateral left fourth rib with associated uptake and this could be a pathologic fracture.  Healing fracture in the left anterior fifth rib lesion.  There is resolution of the left pulmonary emboli.  Decreased FDG uptake of the esophagus consistent with improving inflammation.    CA 27-29 on 10/20/2020 was also elevated at 489.    C#1 Trodelvy on 11/11/2020.  Cycle #2 Trodelvy  12/2/2020  Cycle number 2-day #8 Trodelvy 12/8/2020.    12/15/2020-12/16/2020.  She was admitted to the hospital with nausea vomiting and dehydration.  She had tachycardia and initial lactic acid of 5.  It decreased to 1.4 after 2 L of normal saline.  She also had hypokalemia and ANC was 1.3.  She was treated with IV fluids.  Procalcitonin was negative.  CTA of the chest was negative for pulmonary embolism or pneumonia.  No bacterial infection was documented.  Her medication which she was initially unable to tolerate at home, were restarted and she was discharged home once started to feel better.      We delayed the start of cycle number 3 x 1 week and decrease the dose of chemotherapy by 25%.  She also had neutropenia with ANC of 1.0.      She started cycle number 3-day #1 on 12/29/2020.  CA 27-29 was 392.    Cycle number 3-day #8 1/5/2021.    C#4 Trodelvy 1/20/2021- 75% dose    2/5/2021.  PET/CT overall shows a good response to treatment with improvement of previously hypermetabolic lesions in the spine and pelvis and stability of other bone meta stasis.  There is a single lytic lesion in the right iliac bone which demonstrates slight Yimi increased FDG uptake and has SUV max 4.7 while previously it was SUV max 3.4.  There was diffuse wall thickening of the esophagus with uptake in the esophagus in the fundus of the stomach and this could be seen with inflammation.    Trodelvy cycle #5 - 2/10/2021.  75% of the dose.  CA 27-29 was 337.    Trodelvy cycle #6 - 3/3/2021.  75% of the dose.  Trodelvy cycle #6, D#8 - 3/10/2021.  75% of the dose.    Trodelvy C#8, D#8 on 4/21/2021  CA 27-29 stable at 371 on 4/14/2021    Trodelvy, cycle #9- 5/5/2021        On 2/25/2020 when she had biopsy of the right acetabulum and it was consistent with metastatic lobular carcinoma.  Again it was Triple Negative.     On 3/18/2020 when she also met with Dr. Arelis Arshad who also advised testing for androgen receptor studies on the biopsy  specimen.  Tumor was diffusely androgen receptor positive.      5/26/2021-cycle #10 Trodelvy     CA 27-29 was 545.    6/11/2021.  PET/CT shows mildly increased uptake in several bone lesions for example in the left anterior iliac crest, mid right iliac crest and left ischium.  Uptake in other bone lesions are similar to previously.    Because of minimal progression of the disease and she is tolerating chemotherapy very well, we decided on continuing the same chemotherapy.    6/16/2021-Trodelvy cycle #11    7/7/2021 -Trodelvy cycle #12    9/14/2021-Trodelvy-cycle #15, day #8.    9/8/2021-CA 27-29 was more elevated and it was 1176.    PET/CT on 9/24/2021 showed stable findings with no evidence of FDG avid metastatic disease within the body.  Left axillary lymph nodes are prominent and hypermetabolic and likely from recent vaccinations in the left deltoid muscle.  Healed bony metastasis seems stable.      Cycle #16, Trodelvy 9/28/2021.  Cycle #17, day #8 Trodelvy was on 10/26/2021.  Cycle #18, day #8 Trodelvy on 11/22/2021       She saw Dr. Mejia whom on 10/6/2021.  She was not considered eligible for Enfortumab trial.    Cycle #19, Trodelvy on 12/7/2021 12/21/2021.  PET/CT showed progression of bony metastatic disease.  There is increase hypermetabolism in both the right and left iliac bones and the sternum.  Other bone lesions are stable.    There is new scattered areas of groundglass throughout both the lungs and these findings are indeterminate but may represent an infectious etiology.  Interval resolution of left axillary FDG avid lymphadenopathy.    She was started on Casodex 150 mg daily on 1/6/2022.    She was admitted to the hospital from 3/17/2022-3/19/2022 for vomiting and diarrhea and  severe back pain.  I reviewed the discharge summary.  CT lumbar spine showed a stable severe chronic L1 pathologic compression fracture.  Stable mild compression deformity of superior endplate of L3 and superior endplate  of L4.  The upper margin of the L4 fracture extends slightly into the spinal canal without high-grade canal stenosis and this is also stable.  Nondisplaced fractures coursing through the L3 pedicles bilaterally were seen which were not clearly seen previously this could be acute and likely pathologic.  No extraspinal soft tissue abnormality.  Neurosurgery recommended conservative management.  Her pain was controlled and she was discharged home as she felt better with this.      3/31/2022-PET/CT shows progressive bone meta stasis with progressive FDG uptake in the following lesions. Left iliac crest lesion with max SUV of 7.2, previously 3.5. Right mid iliac crest lytic lesion shows maximum SUV of 7.8, previously 6.9.  T10 vertebral body mixed lytic and sclerotic lesion with an SUV of 7.1, previously not hypermetabolic. Thoracic vertebral bodies 8, 7, 5, and 4 also show hypermetabolic lesions were previously there was no hypermetabolism.  Some of the sclerotic osseous lesions are unchanged and do not show increased hypermetabolism compatible treated lesion such as a severe compression deformity at L1 as well as superior compression deformity at L4.  Medial right clavicle sclerotic lesion with SUV max of 4.8, previously not hypermetabolic. There is also right-sided sternal lesions.      4/14/2022--C#1- switch to single agent Doxil 50 mg per metered squared every 4 weeks.    5/13/2022-cycle #2- Doxil- dose reduced to 40 mg per metered square due to severe nausea/vomiting and migraines requiring IV fluids and IV antiemetics.    6/10/2022-cycle #3-Doxil 40 mg per metered square    7/1/2022- PET/CT shows resolution of the hypermetabolic skeletal metastasis.    7/6/2022-cycle #4-Doxil 40 mg per metered square  8/5/2022- cycle #5-Doxil  9/1/2022.  Cycle #6-Doxil  9/30/2022- cycle #7-Doxil    10/18/2022.  PET/CT does not show evidence of any FDG activity    11/2/2022-cycle #8-Doxil.  11/30/2022-cycle  #9-Doxil.  12/28/2022-cycle #10-Doxil    2/3/2023- C#11- Doxil (delayed by 1 week because neutrophil count was 0.9)-wanted to give her Onpro but at that time it was not approved by insurance.    3/3/2023.  Cycle #12 Doxil.  Given with Onpro.    Repeat PET/CT on 3/27/2023 overall showed very stable findings.  PET/CT showed focal FDG avidity in the heart. This is in an expected location of AV node or mitral valve.  This is nonspecific.  We checked an echocardiogram on 4/20/2023 which was unremarkable.  She is asymptomatic.  Continue to monitor clinically.      3/31/2023.  Cycle #13 Doxil.  Given with Onpro.     4/12/2023-she presented to ED with increased migraine headaches and nausea and vomiting.  She was treated symptomatically and was discharged home after she started to feel better.      4/28/2023.  Cycle #14 Doxil.  Given with Onpro    Cycle #16 Doxil with Onpro 6/23/2023    Cycle #17 Doxil with Onpro 7/21/2023      CA 15-3 has been slowly rising.    Repeat PET/CT on 8/8/2023 showed pretty stable findings.    She has been having more pain in the lumbar spine/low back.    8/18/2023.  Cycle #18 Doxil with Onpro    9/15/2023.  Cycle #19 Doxil with Onpro is planned.      Interval history.  This is a video visit.    I also reviewed notes from palliative care Dr. Whitaker and Dr. Yusuf from pain management.    The dose of buprenorphine buccal film was increased.  She started this 3 days ago and has noticed significant improvement in the pain.  She has not been taking the morphine short acting medication.  She has not been using Flexeril.  There is plan to do bilateral steroid injections to SI joints on 10/13/2023.  She tolerated last chemotherapy well without any nausea vomiting diarrhea constipation.  No new pain.  Energy is fine.  No infections.  Migraines are well controlled.  Overall she is pleased with the way the treatments are going.      ECOG 1    ROS:  Rest of the comprehensive review of the system was  negative.        I reviewed other history in epic as below.       PAST MEDICAL HISTORY:     1.  Breast cancer  2.  Multiple sclerosis.   3.  Depression.   4.  Migraines.   5.  Hypertension.   6.  Cholecystectomy and umbilical hernia repair.     SOCIAL HISTORY: She smoked for 5 years but quit many years ago.  Drinks alcohol socially.  She lives with her .  She is a teacher in middle school.       FAMILY HISTORY: She mentions that her mother had breast cancer at 49.  Both grandmothers had breast cancer but she is not sure of the age.  A couple of cousins have cancers.  Patient has 3 children who are healthy.    Current Outpatient Medications   Medication    apixaban ANTICOAGULANT (ELIQUIS ANTICOAGULANT) 5 MG tablet    Buprenorphine HCl (BELBUCA) 450 MCG FILM buccal film    busPIRone (BUSPAR) 15 MG tablet    calcium citrate-vitamin D (CITRACAL) 315-250 MG-UNIT TABS    cephALEXin (KEFLEX) 500 MG capsule    Cholecalciferol (VITAMIN D3 PO)    cyclobenzaprine (FLEXERIL) 5 MG tablet    galcanezumab-gnlm (EMGALITY) 120 MG/ML injection    LORazepam (ATIVAN) 0.5 MG tablet    metoclopramide (REGLAN) 5 MG tablet    morphine (MSIR) 15 MG IR tablet    OLANZapine (ZYPREXA) 5 MG tablet    ondansetron (ZOFRAN ODT) 4 MG ODT tab    propranolol ER (INDERAL LA) 80 MG 24 hr capsule    propranolol ER (INDERAL LA) 80 MG 24 hr capsule    traZODone (DESYREL) 50 MG tablet    ubrogepant (UBRELVY) 100 MG tablet    venlafaxine (EFFEXOR XR) 75 MG 24 hr capsule    vitamin B-2 (RIBOFLAVIN) 25 MG TABS tablet    ZOLMitriptan (ZOMIG) 5 MG nasal spray     No current facility-administered medications for this visit.     Facility-Administered Medications Ordered in Other Visits   Medication    heparin 100 unit/mL injection 5 mL    sodium chloride (PF) 0.9% PF flush 10 mL        Allergies   Allergen Reactions    Bactrim [Sulfamethoxazole W/Trimethoprim]      Internal bleeding    Copaxone [Glatiramer] Hives          PHYSICAL EXAMINATION:     There  were no vitals taken for this visit.  Wt Readings from Last 4 Encounters:   09/13/23 59.4 kg (131 lb)   09/06/23 56.7 kg (125 lb)   08/28/23 59.4 kg (131 lb)   08/18/23 59.6 kg (131 lb 6.4 oz)       Constitutional.  Does not seem to be in any acute distress.  Eyes.  No redness or discharge noted.  Respiratory.  Speaking in full sentences.  Breathing seems comfortable without any accessory use of muscles.    Skin.  Visualized his skin does not show any obvious rashes.  Musculoskeletal.  Range of motion for visualized areas is intact.  Neurological.  Alert and oriented x3.  Psychiatric.  Mood, mentation and affect are normal.  Decision making capacity is intact.      The rest of a comprehensive physical examination is deferred due to Public Health Emergency video visit restrictions.        Labs and Imaging.    Reviewed.    8/28/2023  CBC shows WBC 13.1.  Hemoglobin 12.3.  Platelets 140.     Chemistry unremarkable except alkaline phosphatase 127.     CA 15-3 was 193 on 8/18/2023 7/21/2023.    CA 15-3 was 186.    7/3/2023    CA 15-3 was 177 on 6/23/2023      CA 15-3 was 129 on 3/31/2023      CA 15-3 was 123 on 3/3/2023.      11/30/2022     CA 15-3 was 135.    9/30/2022  CA 15-3 was 159.  CA 27-29 was 1992 on 6/10/2022  CA 27-29 was 3370 on 5/13/2022.  It was 5307 on 4/14/2022 ferritin Doxil was started.      9/28/2021.      Iron studies, ferritin is 101, iron 51, TIBC 268 and iron saturation index 19%.    CT lumbar spine on 8/28/2023  IMPRESSION:  1.  No evidence for acute displaced fracture. Fractures of the L1, L3 and L4 vertebral bodies appear stable from 07/31/2023.  2.  No evidence of traumatic subluxation.  3.  Diffuse osseous metastatic disease.      PET/CT on 8/8/2023  COMPARISON: PET/CT 3/27/2023, CT cervical, thoracic, and lumbar spine  7/31/2023     FINDINGS:      HEAD/NECK:  There is no suspicious FDG uptake in the neck.  The paranasal sinuses are clear. The mastoid air cells are clear.  Unchanged  subcutaneous soft tissue nodule in the occipital scalp  without suspicious activity. No suspicious activity in the brain  parenchyma.     No hypermetabolic lymph nodes are demonstrated in the neck.      The mucosal and deep spaces of the neck are unremarkable. The major  salivary glands are unremarkable. The thyroid is unremarkable. The  major vasculature of the neck are patent.     CHEST:  There is no suspicious FDG uptake in the chest. Mildly increased  uptake of the esophagus, most prominent at the distal esophagus.     The central tracheobronchial tree is clear. No pleural effusion or  pneumothorax. No acute consolidation. 6 x 1 mm thickening at the left  major fissure without uptake which has been present on prior exams,  likely intrafissural lymph node versus scarring. No suspicious  pulmonary nodules. Dependent atelectasis.     No hypermetabolic lymph nodes are demonstrated in the chest. Partially  calcified mediastinal and hilar lymph nodes.     Heart size is within normal limits. No pericardial effusion. The  thoracic aorta and main pulmonary artery are within normal limits for  diameter. The esophagus is unremarkable. Left chest wall Port-A-Cath  terminates at the cavoatrial junction. Postoperative changes of  bilateral mastectomies and breast implants.     ABDOMEN AND PELVIS:  There is no suspicious FDG uptake in the abdomen or pelvis.     The liver is unremarkable. Cholecystectomy. Unchanged dilated common  bile duct likely related to prior cholecystectomy. Atrophic pancreas.  The spleen is unremarkable. The adrenal glands are unremarkable.     Symmetric enhancement of the kidneys. No hydronephrosis. Tiny right  renal cortical hypodensity which was present on 6/11/2021. The urinary  bladder is nondistended. Unchanged calcification in the uterus, likely  fibroid.     No hypermetabolic lymph nodes are demonstrated in the abdomen or  pelvis.     Normal caliber of the small and large bowel. Normal appendix.  No free  air, free fluid, or fluid collection. Normal caliber of the abdominal  aorta.     BONES:   Multiple lytic and sclerotic osseous lesions throughout the axial and  proximal appendicular skeleton, some with mild uptake increased  compared to 3/27/2023. For example, a lytic lesion of the T8 vertebral  body demonstrates SUV max of 4.5 (previously 3.5) and a sclerotic and  lytic lesion of the right pelvis superior to the acetabulum  demonstrating SUV max of 3.9, which was previously below background  levels.     Unchanged superior L1 compression fracture and mild L4 compression  deformity. Multiple bilateral chronic rib fracture deformities which  are unchanged. Unchanged chronic fracture of the left glenoid. No  significant spinal canal stenosis or spinal canal or spinal canal  involvement of metastasis.                                                                      IMPRESSION: In this 63-year-old woman with breast cancer with multiple  osseous metastases status post mastectomies, radiation, and  chemotherapy;  1.  Overall, the exam is felt to be stable with multiple lytic and  sclerotic lesions throughout the axial and proximal appendicular  skeleton not significantly changed, representing treated disease. Some  of the lesions demonstrate minimally increased uptake without clear  progression on CT, indeterminate if this represents technical factors  versus very early metastatic disease. No new osseous lesions.  Attention on follow-up.  2.  Stable severe chronic L1 compression fracture.  3.  Esophagitis, unchanged compared to 3/27/2023.    MRI of the lumbar spine on 7/31/2023  COMPARISON: PET/CT 03/27/2023.  TECHNIQUE: MRI Lumbar Spine without IV contrast.     FINDINGS: Examination is mildly limited by patient motion artifact.     Alignment is unchanged. Redemonstrated heterogeneous bone marrow signal throughout the visualized lumbosacral spine including numerous focal abnormal T1 hypointense and STIR  hyperintense marrow replacing lesions, including confluent lesions at T12 and S2   vertebrae. Similar chronic L1 pathologic burst fracture with mild superior endplate retropulsion and severe anterior-central vertebral body height loss. Likewise, similar chronic L4 superior endplate fracture with associated mild retropulsion. Large   Schmorl's node at the L2 inferior endplate, as before. Vertical defects in the bilateral L3 pedicles are redemonstrated compatible with chronic pathologic fractures. Asymmetric STIR hyperintense signal abnormality in the left sacral ala partially   visualized, which may represent additional confluent pathologic marrow replacement. Otherwise, remaining lumbar vertebral body heights are maintained. Conus signal is normal and terminates at L2. No perivertebral soft tissue edema. There is mild edema   within the bilateral multifidus and erector spinae muscles, which may indicate posttraumatic injury/strain. Superficial subcutaneous soft tissue edema may be posttraumatic or related to positioning. Prominence of bile duct may be related to   postcholecystectomy physiologic ectasia. Small/subcentimeter right renal cyst. Last fully formed disc is designated L5-S1. Multilevel disc degeneration and height loss, severe at L5-S1.     T12-L1: No disc without bulge or protrusion. Facet joints are normal. Mild L1 superior endplate retropulsion indents the ventral thecal sac without spinal canal stenosis. No foraminal stenosis.     L1-L2: No disc without bulge or protrusion. Facet joints are normal. No spinal canal or foraminal stenosis.     L2-L3: Bilobed left asymmetric diffuse disc bulge. Mild facet arthropathy with ligamentum flavum thickening. Mild left foraminal stenosis. No right foraminal or spinal canal stenosis.     L3-L4: Bilobed left asymmetric diffuse disc bulge. Mild facet arthropathy with ligamentum flavum thickening. In conjunction with superior endplate retropulsion, there is borderline  mild central spinal canal stenosis and mild left subarticular recess   narrowing. Mild right and moderate left foraminal stenosis.     L4-L5: Small left foraminal disc protrusion. Moderate facet arthropathy. No spinal canal or foraminal stenosis.     L5-S1: No disc without bulge or protrusion. Facet joints are normal. No spinal canal or foraminal stenosis.                                                                      IMPRESSION:  1.  Extensive osseous metastases as well as chronic pathologic fractures at L1, L3, and L4, as detailed.  2.  Partially visualized asymmetric signal abnormality in the left sacral ala may represent confluent pathologic marrow replacement. Marrow edema related to sacral fracture is difficult to exclude in the appropriate clinical context.  3.  Posterior paraspinous muscular edema can be compatible with posttraumatic injury/strain.  4.  Multilevel degenerative changes, as detailed.        7/6/2023.  MRI brain  IMPRESSION:  1.  No acute ischemia, mass/mass effect, or abnormal intracranial enhancement.   2.  Similar sequela from demyelinating disease.  3.  Osseous metastases, as before.      Echocardiogram 4/20/2023  Left ventricular size, wall motion and function are normal. The ejection  fraction is 60-65%.  Global right ventricular function is normal.  No hemodynamically significant valve abnormalities.  The inferior vena cava is normal.  No pericardial effusion.     This study was compared with the study from 4/7/22. No significant change.      3/27/2023.  PET/CT  1. No new suspicious FDG uptake within the skeleton.  2. Stable numerous non-FDG avid lytic and sclerotic osseous lesions  throughout the axial skeleton.  3. Stable nondisplaced fracture of the left acromion/glenoid, chronic  bilateral healing rib fractures, and compression deformities of the  thoracic and lumbar spine.  4. Hypermetabolic focus in the expected location of the AV node versus  mitral valve, this may represent  a normal AV node versus mitral valve  pathology such as vegetations. Recommend follow-up with cardiac  Ultrasound.        11/30/2022-x-ray of the ribs of the left side  There are a few sclerotic lesion seen in the right anterior ribs that were seen on previous PET CTs. These appear to involve the fourth and fifth ribs. There are likely more subtle lesions that were   described on the PET CT scan. Irregularity of the distal end of the left ninth rib may be from metastatic disease or fracture on the oblique view which likely is chronic.       10/18/2022-PET/CT showed no new suspicious FDG uptake within the skeleton.  Stable abnormal FDG avid lytic and sclerotic osseous lesions.  Mild diffuse FDG uptake throughout the large bowel without CT abnormality.    7/1/2022.  PET/CT shows diffuse sclerotic and lytic osseous lesions without any abnormal FDG uptake in the skeleton, consistent with treated disease.  There is evaluation of previous hypermetabolic bone lesions.  Multiple chronic rib fracture deformities and stable compression fracture deformities of T6, L1 and L4.  No suspicious FDG uptake in the chest abdomen or pelvis.    3/31/2022-PET/CT shows progressive bone metastasis with progressive FDG uptake in the following lesions. Left iliac crest lesion with max SUV of 7.2, previously 3.5. Right mid iliac crest lytic lesion shows maximum SUV of 7.8, previously 6.9.  T10 vertebral body mixed lytic and sclerotic lesion with an SUV of 7.1, previously not hypermetabolic. Thoracic vertebral bodies 8, 7, 5, and 4 also show hypermetabolic lesions were previously there was no hypermetabolism.  Some of the sclerotic osseous lesions are unchanged and do not show increased hypermetabolism compatible treated lesion such as a severe compression deformity at L1 as well as superior compression deformity at L4.  Medial right clavicle sclerotic lesion with SUV max of 4.8, previously not hypermetabolic. There is also right-sided sternal  lesions.      Biopsy from the right acetabulum shows metastatic lobular carcinoma.  It is triple negative.  Androgen receptor was diffusely positive (90%, moderate intensity) by immunohistochemistry. )  PD-L1 negative.    Foundation one showed CDH1 mutation (initially lobular cancer), CCND1 amplification ( equivocal ). FGF3, FGF4, FGF19 amplification ( Equivocal ). Most promising is CCND1 amplification with abemaciclib showing response in 4/10 patients. FGF3,FGF4 and FGF19 are targetable with pan-FGFR inhibitors.           ASSESSMENT AND PLAN:     Metastatic breast cancer negative breast cancer (androgen receptor positive ) with extensive involvement of the bones.  There is also a left lower lobe hypermetabolic lesion and mediastinal lymph nodes which are hypermetabolic.  The biopsy from the right iliac bone is consistent with metastatic lobular breast cancer but it is hormone receptor and HER-2 negative and PDL 1 is also negative.    Foundation 1 testing did not reveal any actionable mutations.    She was intolerant to Xeloda and progressed on Taxol and eribulin.      We then switched to recently FDA approved sacituzumab govitecan-hziy (Trodelvy) for TNBC, based on the results of the phase II IMMU-132-01 clinical trial.   She started this on 11/11/2020.      She obtained a second opinion from Dr. Castillo from [facility] who recommended a repeat biopsy to be done to make sure that she really has triple negative breast cancer.      She also met with Dr. Arelis Arshad on 3/18/2021.      After 10 cycles of Trodelvy, she had PET/CT on 6/11/2021 which showed mildly increased uptake in some of the bone lesions while stability in other bone lesions.        After 15 cycles, repeat PET scan showed stable findings.  CA 27-29 has been increasing and it is 1176.      As she was tolerating the chemotherapy well and her quality of life has been decent and scans were stable although she had a rising CA 27-29, we decided  on continuing the same chemotherapy because it has been a very slow progression of the disease.    Then she had clear progression of the disease in the bones with increased FDG uptake in both right and left iliac bones and the sternum.    Foundation one showed CDH1 mutation (initially lobular cancer), CCND1 amplification ( equivocal ). FGF3, FGF4, FGF19 amplification ( Equivocal ). Most promising is CCND1 amplification with abemaciclib showing response in 4/10 patients. FGF3,FGF4 and FGF19 are targetable with pan-FGFR inhibitor    As the tumor is diffusely positive for androgen receptor, we decided to start her on androgen receptor blockers.    I initially recommended enzalutamide 160 mg daily ( Enzalutamide for the Treatment of Androgen Receptor-Expressing Triple-Negative Breast Cancer----J Clin Oncol. 2018 Mar 20; 36(9): 884-890. ).    Eventually we decided to start her on Casodex 150 mg daily instead of Enzalutamide due to cost issues.  Clinical Trial Clin Cancer Res. 2013 Oct 1;19(19):5505-12.   Phase II trial of bicalutamide in patients with androgen receptor-positive, estrogen receptor-negative metastatic Breast Cancer  She started Casodex on 1/6/2022.    She had progressive disease in the bones on the PET scan on 3/31/2022.    We changed to single agent Doxil on 4/14/2022.      She developed significant nausea vomiting and headache so cycle #2 was given on 5/13/2022 with 20% dose reduction which she tolerated well.    Cycle #3 was given on 6/10/2022 with the same dose reduced Doxil.    Repeat PET/CT on 7/1/2022 shows resolution of the FDG avid bony metastasis consistent with treated disease.  No suspicious FDG uptake was seen in the chest abdomen and pelvis.  CA 27-29 was also coming down.     Repeat PET/CT in October 2022 after completing 7 cycles continued to show normal FDG uptake.  CA 15-3 was trending down     Cycle #11 was delayed by 1 week because of chemotherapy-induced neutropenia.  It was received  on 2/3/2023.      She received cycle #12 on 3/3/2023 with Onpro.    Repeat PET/CT on 3/27/2023 overall showed very stable findings.    Cycle 13 was given on 3/31/2023 with Onpro support.      Cycle #14 was on 4/28/2023.   CA 15-3 was stable at 115.     CA 15-3 has been slowly rising.    Repeat PET/CT on 8/8/2023 showed pretty stable findings.    She has been having more pain in the lumbar spine/low back.    Otherwise tolerating chemotherapy well.    We discussed the situation in detail.  It seems that cancer has started to progress very slowly based on the rising CA 15-3 but PET scan remains a stable.  Potentially we can continue with Doxil considering that it is keeping the cancer slowed down and she is tolerating this well.  On the other hand we can switch to another chemotherapy like carboplatin.  We discussed pros and cons of each approach and at this time decided to continue with Doxil and repeat PET/CT in 2 to 3 months.  We will continue to monitor CA 15-3 serially.    She received cycle #18 Doxil on 8/18/2023.  CA 15-3 continues to slowly rise.    She will get cycle #19 Doxil on 9/15/2023 and we discussed the timing of next PET scan.  As she is otherwise doing well, we decided to do PET scan after cycle #20.        Chemotherapy-induced neutropenia.  Continue Onpro support.        Bone disease.  She has metastatic disease to the bone.  Previously she got palliative radiation to T12-L5 3000 cGy in 10 fractions from 9/6/2019 till 9/18/2019.  She was evaluated by orthopedics Dr. Bipin Elliott on 11/1/2019 and conservative management was recommended.    She was on Zometa every 3 months. She last received on 9/29/2020.  Because of progression of the disease in the bones, we switched to Xgeva which she received on 11/11/2020.    She had progression of the disease in the bones so we switched to Doxil but we continued xgeva.  PET scan on 7/1/2022 does not show any suspicious FDG uptake in the bones consistent  with treated disease.  Repeat PET/CT in March 2023 was without any active FDG avid lesions.  PET/CT on 8/8/2023 overall showed pretty stable findings.  There has been slight increase in the FDG activity in a few places but without CT evidence of progression so it is indeterminate whether this represent technical factors versus a very early recurrence of active disease.  We will continue Xgeva calcium and vitamin D.        Cancer related pain.  Following with palliative care.  Recently buprenorphine buccal film dose was increased.  She was also seen by Dr. Yusuf from pain clinic.  Plan is to proceed with bilateral steroid injections to SI joints.  Overall pain is better.    Migraines.  Doing much better with Emgality.  She takes as needed Ubrelvy which helps.  She is following with neurology.      Bilateral pulmonary emboli.  Continue Eliquis.      We did not address the following today.      Shingles.  The rash has dried out.  She completed well with acyclovir and prednisone.  She takes gabapentin for postherpetic neuralgia and is doing better.        Constipation. Continue senna.    Neuropathy.  This remains mild and stable with neuropathy in her hands.    This is in addition to the chronic balance issues and heat and cold sensitivity from underlying multiple sclerosis which is also stable for years.  Continue to monitor.     Subcutaneous nodule in the scalp.  This looks like an epidermoid cyst.  She thinks it is a stable.  Continue to monitor.       Insomnia.  Continue trazodone.      Wall thickening of esophagus and FDG uptake of fundus of the stomach.  It could be in the setting of inflammation from recent nausea and vomiting.  Symptoms have resolved.  Continue to monitor clinically.    Vision changes.    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  It was thought that this could be due to Taxol as patient reported to the ophthalmologist that she was on Taxol in September  2019 when her symptoms started.  But she was not on Taxol in September 2019 when she started noticing the visual changes so Taxol is not responsible for this.  The first dose of Taxol was on 11/5/2019.   She had left cataract extraction on 2/8/2021 and she has noticed significant improvement in her vision.  Thinks that her vision is back to her usual.      Increased FDG ability in the colon.  She had FDG uptake in the cecum and ascending colon but no corresponding lesion was seen on the CT scan.  She is completely asymptomatic so at this time we will continue to observe.    Discussion regarding healthcare directives.  On previous visit she told me that she will bring the health care directive for our records but she forgot so I told her to bring it on next visit.      Breast implant removal.  She tells me that she was planning to do breast implant removal because there was a recall on this.  Her surgery was scheduled for 9/24/2019.  Because of this new development of metastatic breast cancer and her recent radiation, surgery has been canceled.  We discussed that at this time treatment for metastatic breast cancer would take precedence over the other surgery as the other surgery would require her to be off chemotherapy for several weeks and in that case the chances of cancer progression would be high and I would not recommend that.    Multiple sclerosis.  She will continue to follow with her neurologist Dr. Quiles.  Currently multiple sclerosis is under control and currently she is not taking any medications.      As she has SI joint injection is scheduled for 10/13/2023, we will schedule chemotherapy on 10/16/2023.  I will see her back on 10/10/2023.    All of her questions were answered to her satisfaction.  She is agreeable and comfortable with the plan.    Dewayne Zepeda MD      Video visit details.  Video start time. 9:08 AM   Video stop time. 9:18 AM   Video platform. [employer]  Patient location. Home  Provider  location. On-site

## 2023-09-14 NOTE — PROGRESS NOTES
ONCOLOGY FOLLOW UP NOTE: 9/14/23        I took the history from reviewing the previous notes that she was following with Dr. Amaay.  I have copied and updated from prior notes.    ONCOLOGY History:    1.  January 2006:  Diagnosed with Stage IIB, T2 N1 M0 invasive lobular carcinoma of the right breast.  Final pathology showed a 4.5 x 3.8 x 2.5 cm, 01/14 lymph nodes positive.  Estrogen, progesterone receptor positive, HER-2 negative.     2.  Genetics.  BRCA1 and 2 mutations not detected. 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       THERAPY TO DATE:   1.  2006:  ECOG 2104 protocol of dose-dense Adriamycin, Cytoxan and Avastin x4 cycles followed by Taxol and Avastin x4 cycles.   2.  03/2007:  Completed 1 year Avastin.   3.  11/2006-01/2007:  Radiotherapy to the right breast of 5040 cGy.   4.  [id number]/[id number]:  Aromasin.  Stopped after moving to the [facility].   5.  01/2016:  Right modified radical mastectomy with latissimus dorsi flap reconstruction and a left prophylactic mastectomy with latissimus dorsi flap reconstruction.     She recently had MRI of the brain as a follow-up for multiple sclerosis and there were multiple calvarial lesions noted which was suspicious for metastatic disease.  There was no evidence of new multiple sclerosis lesions.  She had a PET scan on 8/30/2019 which showed widespread bone metastatic lesions (calvarium, spine, sacrum, pelvis, ribs most prominent at C7, T3, L1 and L4), hypermetabolic 3 cm lesion in LLL, and mediastinal/hilar LN. CEA wnl at 1.9 and C27-29 elevated to 415 (28 on 8/23/16). A CT guided biopsy of the right iliac bone was obtained on 9/4/19, pathology consistent with metastatic adenocarcinoma (breast), ER/SC negative, HER-2 negative PDL 1 < 1%. A second biopsy was take of the LLL nodule via EBUS on 9/5/19 did not show any malignancy.    C/T/L spine MRI 8/3/19 to 9/2/19 with numerous enhancing lesions of the C, T and L spine, largest around T9 and L1. No evidence of  cord compression. Has a L1 compression fracture and impending L4.     Received radiation T12-L5 3000 cGy in 10 fractions from 9/6/2019 till 9/18/2019.  Neurosurgery recommended no surgical intervention but to wear Gorge brace when out of bed and HOB >30 degrees    She started palliative Xeloda 2000/1500 on 10/1/2019 but after just a few doses she noticed nausea, red eyes blurred vision, loss of appetite.  She stopped taking it after 5 doses and was seen by nurse practitioner on 10/7/2019 when she was improving.  At that point she was restarted on Xeloda at a lower dose of 1000 mg twice a day.  As she was not able to tolerate even the lower dose with extreme nausea and vomiting and feeling extremely fatigued and blurry vision, we decided on stopping Xeloda.    We decided on repeating scans and MRI brain on 10/25/2019 showed no intracranial metastatic disease.   Multiple enhancing calvarial lesions may be increased in number and are suggestive of metastatic disease. Thinner imaging on the current study may identify the smaller lesions and may be responsible for  identified more lesions.   There is a single focus of T2 hyperintense signal within the anterior right temporal lobe may represent interval demyelination. There is no evidence for active demyelination.    PET/CT on 11/1/2019 showed favorable response to therapy and Overall FDG uptake within scattered osseous metastases is decreased since 8/30/2019, particularly within the pelvis. Increased sclerotic appearance of L1 and L4 pathologic compression deformities, likely sequela of recently completed palliative radiation therapy.    No significant FDG uptake in previously demonstrated hypermetabolic mediastinal lymph nodes. Biopsy negative on 9/5/2019.  There is also decreased FDG uptake in the left lower lobe (biopsy negative for  malignancy), now with dense consolidation containing air bronchograms suggestive of infection versus aspiration.  There is diffuse FDG  uptake within the esophagus, consistent with esophagitis.    Because of intolerance to Xeloda we decided on switching to weekly paclitaxel on 11/5/2019.    C#2 Taxol 12/4/19- started with 70mg/m2 dose reduction    C#3 Taxol 12/30/2019     PET/CT on 1/24/2020 showed progression of the disease in some of the bone lesions including increase in size and lytic lesion within the vertebral body of C4 measuring 1 cm as opposed to 0.6 cm previously and a new central soft tissue nodule with SUV max 4.1 when previously it was non-hypermetabolic.  There is also some progression noted in the right proximal femur and right iliac bone.  There is decreased metabolic uptake in the right lamina of T9 with SUV max 4.7 when previously it was 8.0.  Other lesions are stable.    CA 27-29 also had increased significantly and it was 257 on 1/28/2020.    We decided on switching to eribulin on 1/28/2020.    C#2 2/18/2020  C#2 D#8 2/25/2020    C#3 D#1 3/18/2020 -delayed by 1 week as per patient's preference because she wanted to visit her family.    She had a repeat PET/CT on 3/27/2020 which showed stable size of the bone metastatic lesions although the FDG avidity was less, consistent with treatment response.    She had MRI of the thoracic spine on 4/3/2020 and it was compared to the one which was done in August 2019 and it showed increased size of several metastatic lesions most notably at T9 but also at T5-T7.  Other lesions at T10, T11 and T12 appeared improved.    CA 27-29 was also down to 222 on 3/18/2020.    Cycle #4 eribulin ( dose reduced to 1.2 mg/m2 )-4/7/2020-   Cycle #5 Eribulin ( 1.2 mg/m2 )- 4/28/2020   Cycle #6 Eribulin ( 1.2 mg/m2 )- 5/19/2020     Cycle #7 Eribulin ( 1.2 mg/m2 )- 6/9/2020    Cycle #8 Eribulin ( 1.2 mg/m2 )- 6/30/2020     She had a repeat PET scan on 7/17/2020 which showed incidental finding of new acute bilateral pulmonary embolism in the distal left main pulmonary artery extending in the left upper and lower  lobes and in the segmental and branch arteries in the right lower lobe.    It also showed mildly increased FDG avidity in the lytic lesion in the T9 lamina and right pedicle with SUV max 5.3, previously 3.9.  That is also slightly increased FDG uptake to the left anterior fifth rib at the costochondral junction SUV max 3.9, previously 2.5.  There is slightly decreased FDG uptake in the right femoral neck lesion SUV max 2.2, previously 2.9.  FDG uptake in other bone lesions is fairly stable.  No new metastasis are seen.    CA 27-29 was 308 on 6/30/2020.  It was 286 on 5/19/2020.    Overall this is consistent with only slight progression versus stable disease.    She was started on Lovenox.    Cycle #9 eribulin 7/21/2020. CA 27-29 was 238    C#10 eribulin 8/18/2020. ( delayed by one week for ANC 0.9 )    C#11 Eribulin 9/8/2020    C#12 Eribulin 9/29/2020     C#13 Eribulin 10/20/2020     PET scan on 11/6/2020 shows progression of the disease with increased FDG uptake in the lytic lesions in the T9/T10 and right posterolateral elements as well as increased FDG uptake in the previously known hypermetabolic right iliac bone lesion suggesting recurrence of previously treated metastasis.  There is new subtle lesion in the right manubrium.  There is also a new fracture in the anterolateral left fourth rib with associated uptake and this could be a pathologic fracture.  Healing fracture in the left anterior fifth rib lesion.  There is resolution of the left pulmonary emboli.  Decreased FDG uptake of the esophagus consistent with improving inflammation.    CA 27-29 on 10/20/2020 was also elevated at 489.    C#1 Trodelvy on 11/11/2020.  Cycle #2 Trodelvy 12/2/2020  Cycle number 2-day #8 Trodelvy 12/8/2020.    12/15/2020-12/16/2020.  She was admitted to the hospital with nausea vomiting and dehydration.  She had tachycardia and initial lactic acid of 5.  It decreased to 1.4 after 2 L of normal saline.  She also had hypokalemia  and ANC was 1.3.  She was treated with IV fluids.  Procalcitonin was negative.  CTA of the chest was negative for pulmonary embolism or pneumonia.  No bacterial infection was documented.  Her medication which she was initially unable to tolerate at home, were restarted and she was discharged home once started to feel better.      We delayed the start of cycle number 3 x 1 week and decrease the dose of chemotherapy by 25%.  She also had neutropenia with ANC of 1.0.      She started cycle number 3-day #1 on 12/29/2020.  CA 27-29 was 392.    Cycle number 3-day #8 1/5/2021.    C#4 Trodelvy 1/20/2021- 75% dose    2/5/2021.  PET/CT overall shows a good response to treatment with improvement of previously hypermetabolic lesions in the spine and pelvis and stability of other bone meta stasis.  There is a single lytic lesion in the right iliac bone which demonstrates slight Yimi increased FDG uptake and has SUV max 4.7 while previously it was SUV max 3.4.  There was diffuse wall thickening of the esophagus with uptake in the esophagus in the fundus of the stomach and this could be seen with inflammation.    Trodelvy cycle #5 - 2/10/2021.  75% of the dose.  CA 27-29 was 337.    Trodelvy cycle #6 - 3/3/2021.  75% of the dose.  Trodelvy cycle #6, D#8 - 3/10/2021.  75% of the dose.    Trodelvy C#8, D#8 on 4/21/2021  CA 27-29 stable at 371 on 4/14/2021    Trodelvy, cycle #9- 5/5/2021        On 2/25/2020 when she had biopsy of the right acetabulum and it was consistent with metastatic lobular carcinoma.  Again it was Triple Negative.     On 3/18/2020 when she also met with Dr. Arelis Arshad who also advised testing for androgen receptor studies on the biopsy specimen.  Tumor was diffusely androgen receptor positive.      5/26/2021-cycle #10 Trodelvy     CA 27-29 was 545.    6/11/2021.  PET/CT shows mildly increased uptake in several bone lesions for example in the left anterior iliac crest, mid right iliac crest and left ischium.   Uptake in other bone lesions are similar to previously.    Because of minimal progression of the disease and she is tolerating chemotherapy very well, we decided on continuing the same chemotherapy.    6/16/2021-Trodelvy cycle #11    7/7/2021 -Trodelvy cycle #12    9/14/2021-Trodelvy-cycle #15, day #8.    9/8/2021-CA 27-29 was more elevated and it was 1176.    PET/CT on 9/24/2021 showed stable findings with no evidence of FDG avid metastatic disease within the body.  Left axillary lymph nodes are prominent and hypermetabolic and likely from recent vaccinations in the left deltoid muscle.  Healed bony metastasis seems stable.      Cycle #16, Trodelvy 9/28/2021.  Cycle #17, day #8 Trodelvy was on 10/26/2021.  Cycle #18, day #8 Trodelvy on 11/22/2021       She saw Dr. Jackie jamison on 10/6/2021.  She was not considered eligible for Enfortumab trial.    Cycle #19, Trodelvy on 12/7/2021 12/21/2021.  PET/CT showed progression of bony metastatic disease.  There is increase hypermetabolism in both the right and left iliac bones and the sternum.  Other bone lesions are stable.    There is new scattered areas of groundglass throughout both the lungs and these findings are indeterminate but may represent an infectious etiology.  Interval resolution of left axillary FDG avid lymphadenopathy.    She was started on Casodex 150 mg daily on 1/6/2022.    She was admitted to the hospital from 3/17/2022-3/19/2022 for vomiting and diarrhea and  severe back pain.  I reviewed the discharge summary.  CT lumbar spine showed a stable severe chronic L1 pathologic compression fracture.  Stable mild compression deformity of superior endplate of L3 and superior endplate of L4.  The upper margin of the L4 fracture extends slightly into the spinal canal without high-grade canal stenosis and this is also stable.  Nondisplaced fractures coursing through the L3 pedicles bilaterally were seen which were not clearly seen previously this could be  acute and likely pathologic.  No extraspinal soft tissue abnormality.  Neurosurgery recommended conservative management.  Her pain was controlled and she was discharged home as she felt better with this.      3/31/2022-PET/CT shows progressive bone meta stasis with progressive FDG uptake in the following lesions. Left iliac crest lesion with max SUV of 7.2, previously 3.5. Right mid iliac crest lytic lesion shows maximum SUV of 7.8, previously 6.9.  T10 vertebral body mixed lytic and sclerotic lesion with an SUV of 7.1, previously not hypermetabolic. Thoracic vertebral bodies 8, 7, 5, and 4 also show hypermetabolic lesions were previously there was no hypermetabolism.  Some of the sclerotic osseous lesions are unchanged and do not show increased hypermetabolism compatible treated lesion such as a severe compression deformity at L1 as well as superior compression deformity at L4.  Medial right clavicle sclerotic lesion with SUV max of 4.8, previously not hypermetabolic. There is also right-sided sternal lesions.      4/14/2022--C#1- switch to single agent Doxil 50 mg per metered squared every 4 weeks.    5/13/2022-cycle #2- Doxil- dose reduced to 40 mg per metered square due to severe nausea/vomiting and migraines requiring IV fluids and IV antiemetics.    6/10/2022-cycle #3-Doxil 40 mg per metered square    7/1/2022- PET/CT shows resolution of the hypermetabolic skeletal metastasis.    7/6/2022-cycle #4-Doxil 40 mg per metered square  8/5/2022- cycle #5-Doxil  9/1/2022.  Cycle #6-Doxil  9/30/2022- cycle #7-Doxil    10/18/2022.  PET/CT does not show evidence of any FDG activity    11/2/2022-cycle #8-Doxil.  11/30/2022-cycle #9-Doxil.  12/28/2022-cycle #10-Doxil    2/3/2023- C#11- Doxil (delayed by 1 week because neutrophil count was 0.9)-wanted to give her Onpro but at that time it was not approved by insurance.    3/3/2023.  Cycle #12 Doxil.  Given with Onpro.    Repeat PET/CT on 3/27/2023 overall showed very stable  findings.  PET/CT showed focal FDG avidity in the heart. This is in an expected location of AV node or mitral valve.  This is nonspecific.  We checked an echocardiogram on 4/20/2023 which was unremarkable.  She is asymptomatic.  Continue to monitor clinically.      3/31/2023.  Cycle #13 Doxil.  Given with Onpro.     4/12/2023-she presented to ED with increased migraine headaches and nausea and vomiting.  She was treated symptomatically and was discharged home after she started to feel better.      4/28/2023.  Cycle #14 Doxil.  Given with Onpro    Cycle #16 Doxil with Onpro 6/23/2023    Cycle #17 Doxil with Onpro 7/21/2023      CA 15-3 has been slowly rising.    Repeat PET/CT on 8/8/2023 showed pretty stable findings.    She has been having more pain in the lumbar spine/low back.    8/18/2023.  Cycle #18 Doxil with Onpro    9/15/2023.  Cycle #19 Doxil with Onpro is planned.      Interval history.  This is a video visit.    I also reviewed notes from palliative care Dr. Whitaker and Dr. Yusuf from pain management.    The dose of buprenorphine buccal film was increased.  She started this 3 days ago and has noticed significant improvement in the pain.  She has not been taking the morphine short acting medication.  She has not been using Flexeril.  There is plan to do bilateral steroid injections to SI joints on 10/13/2023.  She tolerated last chemotherapy well without any nausea vomiting diarrhea constipation.  No new pain.  Energy is fine.  No infections.  Migraines are well controlled.  Overall she is pleased with the way the treatments are going.      ECOG 1    ROS:  Rest of the comprehensive review of the system was negative.        I reviewed other history in epic as below.       PAST MEDICAL HISTORY:     1.  Breast cancer  2.  Multiple sclerosis.   3.  Depression.   4.  Migraines.   5.  Hypertension.   6.  Cholecystectomy and umbilical hernia repair.     SOCIAL HISTORY: She smoked for 5 years but quit many years  ago.  Drinks alcohol socially.  She lives with her .  She is a teacher in middle school.       FAMILY HISTORY: She mentions that her mother had breast cancer at 49.  Both grandmothers had breast cancer but she is not sure of the age.  A couple of cousins have cancers.  Patient has 3 children who are healthy.    Current Outpatient Medications   Medication    apixaban ANTICOAGULANT (ELIQUIS ANTICOAGULANT) 5 MG tablet    Buprenorphine HCl (BELBUCA) 450 MCG FILM buccal film    busPIRone (BUSPAR) 15 MG tablet    calcium citrate-vitamin D (CITRACAL) 315-250 MG-UNIT TABS    cephALEXin (KEFLEX) 500 MG capsule    Cholecalciferol (VITAMIN D3 PO)    cyclobenzaprine (FLEXERIL) 5 MG tablet    galcanezumab-gnlm (EMGALITY) 120 MG/ML injection    LORazepam (ATIVAN) 0.5 MG tablet    metoclopramide (REGLAN) 5 MG tablet    morphine (MSIR) 15 MG IR tablet    OLANZapine (ZYPREXA) 5 MG tablet    ondansetron (ZOFRAN ODT) 4 MG ODT tab    propranolol ER (INDERAL LA) 80 MG 24 hr capsule    propranolol ER (INDERAL LA) 80 MG 24 hr capsule    traZODone (DESYREL) 50 MG tablet    ubrogepant (UBRELVY) 100 MG tablet    venlafaxine (EFFEXOR XR) 75 MG 24 hr capsule    vitamin B-2 (RIBOFLAVIN) 25 MG TABS tablet    ZOLMitriptan (ZOMIG) 5 MG nasal spray     No current facility-administered medications for this visit.     Facility-Administered Medications Ordered in Other Visits   Medication    heparin 100 unit/mL injection 5 mL    sodium chloride (PF) 0.9% PF flush 10 mL        Allergies   Allergen Reactions    Bactrim [Sulfamethoxazole W/Trimethoprim]      Internal bleeding    Copaxone [Glatiramer] Hives          PHYSICAL EXAMINATION:     There were no vitals taken for this visit.  Wt Readings from Last 4 Encounters:   09/13/23 59.4 kg (131 lb)   09/06/23 56.7 kg (125 lb)   08/28/23 59.4 kg (131 lb)   08/18/23 59.6 kg (131 lb 6.4 oz)       Constitutional.  Does not seem to be in any acute distress.  Eyes.  No redness or discharge  noted.  Respiratory.  Speaking in full sentences.  Breathing seems comfortable without any accessory use of muscles.    Skin.  Visualized his skin does not show any obvious rashes.  Musculoskeletal.  Range of motion for visualized areas is intact.  Neurological.  Alert and oriented x3.  Psychiatric.  Mood, mentation and affect are normal.  Decision making capacity is intact.      The rest of a comprehensive physical examination is deferred due to [facility] video visit restrictions.        Labs and Imaging.    Reviewed.    8/28/2023  CBC shows WBC 13.1.  Hemoglobin 12.3.  Platelets 140.     Chemistry unremarkable except alkaline phosphatase 127.     CA 15-3 was 193 on 8/18/2023 7/21/2023.    CA 15-3 was 186.    7/3/2023    CA 15-3 was 177 on 6/23/2023      CA 15-3 was 129 on 3/31/2023      CA 15-3 was 123 on 3/3/2023.      11/30/2022     CA 15-3 was 135.    9/30/2022  CA 15-3 was 159.  CA 27-29 was 1992 on 6/10/2022  CA 27-29 was 3370 on 5/13/2022.  It was 5307 on 4/14/2022 ferritin Doxil was started.      9/28/2021.      Iron studies, ferritin is 101, iron 51, TIBC 268 and iron saturation index 19%.    CT lumbar spine on 8/28/2023  IMPRESSION:  1.  No evidence for acute displaced fracture. Fractures of the L1, L3 and L4 vertebral bodies appear stable from 07/31/2023.  2.  No evidence of traumatic subluxation.  3.  Diffuse osseous metastatic disease.      PET/CT on 8/8/2023  COMPARISON: PET/CT 3/27/2023, CT cervical, thoracic, and lumbar spine  7/31/2023     FINDINGS:      HEAD/NECK:  There is no suspicious FDG uptake in the neck.  The paranasal sinuses are clear. The mastoid air cells are clear.  Unchanged subcutaneous soft tissue nodule in the occipital scalp  without suspicious activity. No suspicious activity in the brain  parenchyma.     No hypermetabolic lymph nodes are demonstrated in the neck.      The mucosal and deep spaces of the neck are unremarkable. The major  salivary glands are  unremarkable. The thyroid is unremarkable. The  major vasculature of the neck are patent.     CHEST:  There is no suspicious FDG uptake in the chest. Mildly increased  uptake of the esophagus, most prominent at the distal esophagus.     The central tracheobronchial tree is clear. No pleural effusion or  pneumothorax. No acute consolidation. 6 x 1 mm thickening at the left  major fissure without uptake which has been present on prior exams,  likely intrafissural lymph node versus scarring. No suspicious  pulmonary nodules. Dependent atelectasis.     No hypermetabolic lymph nodes are demonstrated in the chest. Partially  calcified mediastinal and hilar lymph nodes.     Heart size is within normal limits. No pericardial effusion. The  thoracic aorta and main pulmonary artery are within normal limits for  diameter. The esophagus is unremarkable. Left chest wall Port-A-Cath  terminates at the cavoatrial junction. Postoperative changes of  bilateral mastectomies and breast implants.     ABDOMEN AND PELVIS:  There is no suspicious FDG uptake in the abdomen or pelvis.     The liver is unremarkable. Cholecystectomy. Unchanged dilated common  bile duct likely related to prior cholecystectomy. Atrophic pancreas.  The spleen is unremarkable. The adrenal glands are unremarkable.     Symmetric enhancement of the kidneys. No hydronephrosis. Tiny right  renal cortical hypodensity which was present on 6/11/2021. The urinary  bladder is nondistended. Unchanged calcification in the uterus, likely  fibroid.     No hypermetabolic lymph nodes are demonstrated in the abdomen or  pelvis.     Normal caliber of the small and large bowel. Normal appendix. No free  air, free fluid, or fluid collection. Normal caliber of the abdominal  aorta.     BONES:   Multiple lytic and sclerotic osseous lesions throughout the axial and  proximal appendicular skeleton, some with mild uptake increased  compared to 3/27/2023. For example, a lytic lesion of  the T8 vertebral  body demonstrates SUV max of 4.5 (previously 3.5) and a sclerotic and  lytic lesion of the right pelvis superior to the acetabulum  demonstrating SUV max of 3.9, which was previously below background  levels.     Unchanged superior L1 compression fracture and mild L4 compression  deformity. Multiple bilateral chronic rib fracture deformities which  are unchanged. Unchanged chronic fracture of the left glenoid. No  significant spinal canal stenosis or spinal canal or spinal canal  involvement of metastasis.                                                                      IMPRESSION: In this 63-year-old woman with breast cancer with multiple  osseous metastases status post mastectomies, radiation, and  chemotherapy;  1.  Overall, the exam is felt to be stable with multiple lytic and  sclerotic lesions throughout the axial and proximal appendicular  skeleton not significantly changed, representing treated disease. Some  of the lesions demonstrate minimally increased uptake without clear  progression on CT, indeterminate if this represents technical factors  versus very early metastatic disease. No new osseous lesions.  Attention on follow-up.  2.  Stable severe chronic L1 compression fracture.  3.  Esophagitis, unchanged compared to 3/27/2023.    MRI of the lumbar spine on 7/31/2023  COMPARISON: PET/CT 03/27/2023.  TECHNIQUE: MRI Lumbar Spine without IV contrast.     FINDINGS: Examination is mildly limited by patient motion artifact.     Alignment is unchanged. Redemonstrated heterogeneous bone marrow signal throughout the visualized lumbosacral spine including numerous focal abnormal T1 hypointense and STIR hyperintense marrow replacing lesions, including confluent lesions at T12 and S2   vertebrae. Similar chronic L1 pathologic burst fracture with mild superior endplate retropulsion and severe anterior-central vertebral body height loss. Likewise, similar chronic L4 superior endplate fracture  with associated mild retropulsion. Large   Schmorl's node at the L2 inferior endplate, as before. Vertical defects in the bilateral L3 pedicles are redemonstrated compatible with chronic pathologic fractures. Asymmetric STIR hyperintense signal abnormality in the left sacral ala partially   visualized, which may represent additional confluent pathologic marrow replacement. Otherwise, remaining lumbar vertebral body heights are maintained. Conus signal is normal and terminates at L2. No perivertebral soft tissue edema. There is mild edema   within the bilateral multifidus and erector spinae muscles, which may indicate posttraumatic injury/strain. Superficial subcutaneous soft tissue edema may be posttraumatic or related to positioning. Prominence of bile duct may be related to   postcholecystectomy physiologic ectasia. Small/subcentimeter right renal cyst. Last fully formed disc is designated L5-S1. Multilevel disc degeneration and height loss, severe at L5-S1.     T12-L1: No disc without bulge or protrusion. Facet joints are normal. Mild L1 superior endplate retropulsion indents the ventral thecal sac without spinal canal stenosis. No foraminal stenosis.     L1-L2: No disc without bulge or protrusion. Facet joints are normal. No spinal canal or foraminal stenosis.     L2-L3: Bilobed left asymmetric diffuse disc bulge. Mild facet arthropathy with ligamentum flavum thickening. Mild left foraminal stenosis. No right foraminal or spinal canal stenosis.     L3-L4: Bilobed left asymmetric diffuse disc bulge. Mild facet arthropathy with ligamentum flavum thickening. In conjunction with superior endplate retropulsion, there is borderline mild central spinal canal stenosis and mild left subarticular recess   narrowing. Mild right and moderate left foraminal stenosis.     L4-L5: Small left foraminal disc protrusion. Moderate facet arthropathy. No spinal canal or foraminal stenosis.     L5-S1: No disc without bulge or  protrusion. Facet joints are normal. No spinal canal or foraminal stenosis.                                                                      IMPRESSION:  1.  Extensive osseous metastases as well as chronic pathologic fractures at L1, L3, and L4, as detailed.  2.  Partially visualized asymmetric signal abnormality in the left sacral ala may represent confluent pathologic marrow replacement. Marrow edema related to sacral fracture is difficult to exclude in the appropriate clinical context.  3.  Posterior paraspinous muscular edema can be compatible with posttraumatic injury/strain.  4.  Multilevel degenerative changes, as detailed.        7/6/2023.  MRI brain  IMPRESSION:  1.  No acute ischemia, mass/mass effect, or abnormal intracranial enhancement.   2.  Similar sequela from demyelinating disease.  3.  Osseous metastases, as before.      Echocardiogram 4/20/2023  Left ventricular size, wall motion and function are normal. The ejection  fraction is 60-65%.  Global right ventricular function is normal.  No hemodynamically significant valve abnormalities.  The inferior vena cava is normal.  No pericardial effusion.     This study was compared with the study from 4/7/22. No significant change.      3/27/2023.  PET/CT  1. No new suspicious FDG uptake within the skeleton.  2. Stable numerous non-FDG avid lytic and sclerotic osseous lesions  throughout the axial skeleton.  3. Stable nondisplaced fracture of the left acromion/glenoid, chronic  bilateral healing rib fractures, and compression deformities of the  thoracic and lumbar spine.  4. Hypermetabolic focus in the expected location of the AV node versus  mitral valve, this may represent a normal AV node versus mitral valve  pathology such as vegetations. Recommend follow-up with cardiac  Ultrasound.        11/30/2022-x-ray of the ribs of the left side  There are a few sclerotic lesion seen in the right anterior ribs that were seen on previous PET CTs. These appear  to involve the fourth and fifth ribs. There are likely more subtle lesions that were   described on the PET CT scan. Irregularity of the distal end of the left ninth rib may be from metastatic disease or fracture on the oblique view which likely is chronic.       10/18/2022-PET/CT showed no new suspicious FDG uptake within the skeleton.  Stable abnormal FDG avid lytic and sclerotic osseous lesions.  Mild diffuse FDG uptake throughout the large bowel without CT abnormality.    7/1/2022.  PET/CT shows diffuse sclerotic and lytic osseous lesions without any abnormal FDG uptake in the skeleton, consistent with treated disease.  There is evaluation of previous hypermetabolic bone lesions.  Multiple chronic rib fracture deformities and stable compression fracture deformities of T6, L1 and L4.  No suspicious FDG uptake in the chest abdomen or pelvis.    3/31/2022-PET/CT shows progressive bone metastasis with progressive FDG uptake in the following lesions. Left iliac crest lesion with max SUV of 7.2, previously 3.5. Right mid iliac crest lytic lesion shows maximum SUV of 7.8, previously 6.9.  T10 vertebral body mixed lytic and sclerotic lesion with an SUV of 7.1, previously not hypermetabolic. Thoracic vertebral bodies 8, 7, 5, and 4 also show hypermetabolic lesions were previously there was no hypermetabolism.  Some of the sclerotic osseous lesions are unchanged and do not show increased hypermetabolism compatible treated lesion such as a severe compression deformity at L1 as well as superior compression deformity at L4.  Medial right clavicle sclerotic lesion with SUV max of 4.8, previously not hypermetabolic. There is also right-sided sternal lesions.      Biopsy from the right acetabulum shows metastatic lobular carcinoma.  It is triple negative.  Androgen receptor was diffusely positive (90%, moderate intensity) by immunohistochemistry. )  PD-L1 negative.    Foundation one showed CDH1 mutation (initially lobular  cancer), CCND1 amplification ( equivocal ). FGF3, FGF4, FGF19 amplification ( Equivocal ). Most promising is CCND1 amplification with abemaciclib showing response in 4/10 patients. FGF3,FGF4 and FGF19 are targetable with pan-FGFR inhibitors.           ASSESSMENT AND PLAN:     Metastatic breast cancer negative breast cancer (androgen receptor positive ) with extensive involvement of the bones.  There is also a left lower lobe hypermetabolic lesion and mediastinal lymph nodes which are hypermetabolic.  The biopsy from the right iliac bone is consistent with metastatic lobular breast cancer but it is hormone receptor and HER-2 negative and PDL 1 is also negative.    Foundation 1 testing did not reveal any actionable mutations.    She was intolerant to Xeloda and progressed on Taxol and eribulin.      We then switched to recently FDA approved sacituzumab govitecan-hziy (Trodelvy) for TNBC, based on the results of the phase II IMMU-132-01 clinical trial.   She started this on 11/11/2020.      She obtained a second opinion from Dr. Castillo from [facility] who recommended a repeat biopsy to be done to make sure that she really has triple negative breast cancer.      She also met with Dr. Arelis Arshad on 3/18/2021.      After 10 cycles of Trodelvy, she had PET/CT on 6/11/2021 which showed mildly increased uptake in some of the bone lesions while stability in other bone lesions.        After 15 cycles, repeat PET scan showed stable findings.  CA 27-29 has been increasing and it is 1176.      As she was tolerating the chemotherapy well and her quality of life has been decent and scans were stable although she had a rising CA 27-29, we decided on continuing the same chemotherapy because it has been a very slow progression of the disease.    Then she had clear progression of the disease in the bones with increased FDG uptake in both right and left iliac bones and the sternum.    Foundation one showed CDH1 mutation  (initially lobular cancer), CCND1 amplification ( equivocal ). FGF3, FGF4, FGF19 amplification ( Equivocal ). Most promising is CCND1 amplification with abemaciclib showing response in 4/10 patients. FGF3,FGF4 and FGF19 are targetable with pan-FGFR inhibitor    As the tumor is diffusely positive for androgen receptor, we decided to start her on androgen receptor blockers.    I initially recommended enzalutamide 160 mg daily ( Enzalutamide for the Treatment of Androgen Receptor-Expressing Triple-Negative Breast Cancer----J Clin Oncol. 2018 Mar 20; 36(9): 884-890. ).    Eventually we decided to start her on Casodex 150 mg daily instead of Enzalutamide due to cost issues.  Clinical Trial Clin Cancer Res. 2013 Oct 1;19(19):5505-12.   Phase II trial of bicalutamide in patients with androgen receptor-positive, estrogen receptor-negative metastatic Breast Cancer  She started Casodex on 1/6/2022.    She had progressive disease in the bones on the PET scan on 3/31/2022.    We changed to single agent Doxil on 4/14/2022.      She developed significant nausea vomiting and headache so cycle #2 was given on 5/13/2022 with 20% dose reduction which she tolerated well.    Cycle #3 was given on 6/10/2022 with the same dose reduced Doxil.    Repeat PET/CT on 7/1/2022 shows resolution of the FDG avid bony metastasis consistent with treated disease.  No suspicious FDG uptake was seen in the chest abdomen and pelvis.  CA 27-29 was also coming down.     Repeat PET/CT in October 2022 after completing 7 cycles continued to show normal FDG uptake.  CA 15-3 was trending down     Cycle #11 was delayed by 1 week because of chemotherapy-induced neutropenia.  It was received on 2/3/2023.      She received cycle #12 on 3/3/2023 with Onpro.    Repeat PET/CT on 3/27/2023 overall showed very stable findings.    Cycle 13 was given on 3/31/2023 with Onpro support.      Cycle #14 was on 4/28/2023.   CA 15-3 was stable at 115.     CA 15-3 has been slowly  rising.    Repeat PET/CT on 8/8/2023 showed pretty stable findings.    She has been having more pain in the lumbar spine/low back.    Otherwise tolerating chemotherapy well.    We discussed the situation in detail.  It seems that cancer has started to progress very slowly based on the rising CA 15-3 but PET scan remains a stable.  Potentially we can continue with Doxil considering that it is keeping the cancer slowed down and she is tolerating this well.  On the other hand we can switch to another chemotherapy like carboplatin.  We discussed pros and cons of each approach and at this time decided to continue with Doxil and repeat PET/CT in 2 to 3 months.  We will continue to monitor CA 15-3 serially.    She received cycle #18 Doxil on 8/18/2023.  CA 15-3 continues to slowly rise.    She will get cycle #19 Doxil on 9/15/2023 and we discussed the timing of next PET scan.  As she is otherwise doing well, we decided to do PET scan after cycle #20.        Chemotherapy-induced neutropenia.  Continue Onpro support.        Bone disease.  She has metastatic disease to the bone.  Previously she got palliative radiation to T12-L5 3000 cGy in 10 fractions from 9/6/2019 till 9/18/2019.  She was evaluated by orthopedics Dr. Bipin Elliott on 11/1/2019 and conservative management was recommended.    She was on Zometa every 3 months. She last received on 9/29/2020.  Because of progression of the disease in the bones, we switched to Xgeva which she received on 11/11/2020.    She had progression of the disease in the bones so we switched to Doxil but we continued xgeva.  PET scan on 7/1/2022 does not show any suspicious FDG uptake in the bones consistent with treated disease.  Repeat PET/CT in March 2023 was without any active FDG avid lesions.  PET/CT on 8/8/2023 overall showed pretty stable findings.  There has been slight increase in the FDG activity in a few places but without CT evidence of progression so it is indeterminate  whether this represent technical factors versus a very early recurrence of active disease.  We will continue Xgeva calcium and vitamin D.        Cancer related pain.  Following with palliative care.  Recently buprenorphine buccal film dose was increased.  She was also seen by Dr. Yusuf from pain clinic.  Plan is to proceed with bilateral steroid injections to SI joints.  Overall pain is better.    Migraines.  Doing much better with Emgality.  She takes as needed Ubrelvy which helps.  She is following with neurology.      Bilateral pulmonary emboli.  Continue Eliquis.      We did not address the following today.      Shingles.  The rash has dried out.  She completed well with acyclovir and prednisone.  She takes gabapentin for postherpetic neuralgia and is doing better.        Constipation. Continue senna.    Neuropathy.  This remains mild and stable with neuropathy in her hands.    This is in addition to the chronic balance issues and heat and cold sensitivity from underlying multiple sclerosis which is also stable for years.  Continue to monitor.     Subcutaneous nodule in the scalp.  This looks like an epidermoid cyst.  She thinks it is a stable.  Continue to monitor.       Insomnia.  Continue trazodone.      Wall thickening of esophagus and FDG uptake of fundus of the stomach.  It could be in the setting of inflammation from recent nausea and vomiting.  Symptoms have resolved.  Continue to monitor clinically.    Vision changes.    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  It was thought that this could be due to Taxol as patient reported to the ophthalmologist that she was on Taxol in September 2019 when her symptoms started.  But she was not on Taxol in September 2019 when she started noticing the visual changes so Taxol is not responsible for this.  The first dose of Taxol was on 11/5/2019.   She had left cataract extraction on 2/8/2021 and she has noticed significant  improvement in her vision.  Thinks that her vision is back to her usual.      Increased FDG ability in the colon.  She had FDG uptake in the cecum and ascending colon but no corresponding lesion was seen on the CT scan.  She is completely asymptomatic so at this time we will continue to observe.    Discussion regarding healthcare directives.  On previous visit she told me that she will bring the health care directive for our records but she forgot so I told her to bring it on next visit.      Breast implant removal.  She tells me that she was planning to do breast implant removal because there was a recall on this.  Her surgery was scheduled for 9/24/2019.  Because of this new development of metastatic breast cancer and her recent radiation, surgery has been canceled.  We discussed that at this time treatment for metastatic breast cancer would take precedence over the other surgery as the other surgery would require her to be off chemotherapy for several weeks and in that case the chances of cancer progression would be high and I would not recommend that.    Multiple sclerosis.  She will continue to follow with her neurologist Dr. Quiles.  Currently multiple sclerosis is under control and currently she is not taking any medications.      As she has SI joint injection is scheduled for 10/13/2023, we will schedule chemotherapy on 10/16/2023.  I will see her back on 10/10/2023.    All of her questions were answered to her satisfaction.  She is agreeable and comfortable with the plan.    Dewayne Zepeda MD      Video visit details.  Video start time. 9:08 AM   Video stop time. 9:18 AM   Video platform. Source MDx  Patient location. Home  Provider location. On-site

## 2023-09-14 NOTE — PATIENT INSTRUCTIONS
Chemo tomorrow as scheduled    See me as video on 10/10/2023    Schedule next chemo in [place] for 10/16/2023    PET Scan around 11/9/2023 and labs    See me F2F in [place] 11/15/2023 and chemo the same day

## 2023-09-15 NOTE — PROGRESS NOTES
Infusion Nursing Note:  Shaneka Alvarez presents today for C19D1 Doxil + IVF + Onpro + Xgeva.    Patient seen by provider today: No   present during visit today: Not Applicable.    Note: Patient expressed no new medical concerns.     Encouraged increased water intake over the next 48 hours. Denied jaw concerns.      Intravenous Access:  Implanted Port.    Treatment Conditions:  Lab Results   Component Value Date    HGB 12.8 09/15/2023    WBC 4.7 09/15/2023    ANEU 22.4 (H) 07/27/2023    ANEUTAUTO 3.4 09/15/2023     09/15/2023        Lab Results   Component Value Date     09/15/2023    POTASSIUM 4.2 09/15/2023    MAG 2.0 03/12/2023    CR 0.75 09/15/2023    RAFAELA 9.0 09/15/2023    BILITOTAL 0.3 09/15/2023    ALBUMIN 4.1 09/15/2023    ALT 22 09/15/2023    AST 33 09/15/2023       Results reviewed, labs MET treatment parameters, ok to proceed with treatment.      Post Infusion Assessment:  Patient tolerated infusion without incident.  Patient tolerated injection without incident.  Blood return noted pre and post infusion.  Site patent and intact, free from redness, edema or discomfort.  No evidence of extravasations.  Access discontinued per protocol.     ONPRO  Was placed on patient's: left side of abdomen.    Was placed at 2:24 PM    Podpal used: Yes    ONPRO injector device Lot number: [id number]    Patient education included: what patient can expect after application, what colored lights mean on the device, when to remove device, when and where to call with questions or issues, all patients questions answered, and that Neulasta administration will occur at 5:24 PM on 5/16/23.    Patient tolerated administration well.       Discharge Plan:   [facility]  Patient will return 10/16/23 for next appointment.   Patient discharged in stable condition accompanied by: self.  Departure Mode: Ambulatory.      Halima Bullock RN

## 2023-09-27 PROBLEM — M54.50 CHRONIC BILATERAL LOW BACK PAIN WITHOUT SCIATICA: Status: RESOLVED | Noted: 2023-04-05 | Resolved: 2023-01-01

## 2023-09-27 PROBLEM — M53.3 SI (SACROILIAC) JOINT DYSFUNCTION: Status: RESOLVED | Noted: 2023-04-05 | Resolved: 2023-01-01

## 2023-09-27 PROBLEM — G89.29 CHRONIC BILATERAL LOW BACK PAIN WITHOUT SCIATICA: Status: RESOLVED | Noted: 2023-04-05 | Resolved: 2023-01-01

## 2023-09-27 NOTE — PROGRESS NOTES
06/21/23 0500   Appointment Info   Signing clinician's name / credentials Priyanka Tenorio PT   Total/Authorized Visits 12 ([employer] Alva Whitaker)   Visits Used 5   Medical Diagnosis SI Pain   PT Tx Diagnosis SI Pain   Precautions/Limitations Metastatic Bone Cancer - current infusion therapy   Other pertinent information Migraines under management change   Progress Note/Certification   Therapy Frequency 1 x week   Predicted Duration 12 weeks   Progress Note Completed Date 09/27/23   GOALS   PT Goals 2;3;4   PT Goal 1   Goal Identifier Ambulation STG #1   Goal Description Walk x 60 mins with cart PL = 4/10   Rationale to maximize safety and independence with performance of ADLs and functional tasks;to maximize safety and independence within the home;to maximize safety and independence within the community;to maximize safety and independence with transportation;to maximize safety and independence with self cares   Goal Progress Walking 60 mins with PL = 2/10 - Acheived   Target Date 07/19/23   Date Met 06/21/23   PT Goal 2   Goal Identifier Ambulation STG  #2   Goal Description Walking > 60 mins with PL = 0-2/10   Rationale to maximize safety and independence with performance of ADLs and functional tasks;to maximize safety and independence within the home;to maximize safety and independence within the community;to maximize safety and independence with transportation;to maximize safety and independence with self cares   Target Date 08/02/23   PT Goal 3   Goal Identifier STG #2 - STANDING   Goal Description Stand 15 mins with PL = 2/10   Rationale to maximize safety and independence with performance of ADLs and functional tasks;to maximize safety and independence within the home;to maximize safety and independence within the community;to maximize safety and independence with transportation;to maximize safety and independence with self cares   Goal Progress Stand 15 mins with PL = 2/10   Target Date 07/19/23   Date Met  06/21/23   PT Goal 4   Goal Identifier LTG #2 - STANDING   Goal Description Stand 30 mins with PL =0-1/10   Rationale to maximize safety and independence with performance of ADLs and functional tasks;to maximize safety and independence within the home;to maximize safety and independence within the community;to maximize safety and independence with transportation;to maximize safety and independence with self cares   Target Date 08/02/23   Subjective Report   Subjective Report Shaneka's concern with LB is minimal. Things are pretty good right now. I want to continue to increase and extending endurance.   Objective Measures   Objective Measures Objective Measure 1   Objective Measure 1   Objective Measure Weakened core muscles.   Treatment Interventions (PT)   Interventions Therapeutic Procedure/Exercise;Neuromuscular Re-education;Manual Therapy   Therapeutic Procedure/Exercise   Therapeutic Procedures: strength, endurance, ROM, flexibillity minutes (26175) 15   Therapeutic Procedures Ther Proc 6   Ther Proc 1 LEG PRESS reclined 45 degrees - seat 5 holes   Ther Proc 1 - Details 44# B x 30 and 22# single x 10 each   PTRx Ther Proc 1 Pubic Shot Gun MET   PTRx Ther Proc 1 - Details 5 x 5 sec squeeze (50% effort)   PTRx Ther Proc 2 Piriformis Stretch Above 90 Degress Supine   PTRx Ther Proc 2 - Details 5 x 10 sec each leg    PTRx Ther Proc 3 Repeated Hip External Rotation with Overpressure in Sitting   PTRx Ther Proc 3 - Details 1 x 30 sec each leg    PTRx Ther Proc 4 Donkey Kick #2 Standing   PTRx Ther Proc 4 - Details No Notes   PTRx Ther Proc 5 Standing Quadriceps Stretch using Chair   PTRx Ther Proc 5 - Details 1 x 30 sec each    PTRx Ther Proc 6 Wall Sit   PTRx Ther Proc 6 - Details 1 x 15-60 sec   PTRx Ther Proc 7 Wall Sit with Ball   PTRx Ther Proc 7 - Details 10 with or w/o arms in goal post position   PTRx Ther Proc 8 Ball Stabilization Prone Alternating Arm and Leg Extension   PTRx Ther Proc 8 - Details 5 x 5 sec  each side   PTRx Ther Proc 9 birddog   PTRx Ther Proc 9 - Details 5 sec each side   PTRx Ther Proc 10 Supine Abdominal Exercise #7A (Arm Extension with Legs at 90/90)   PTRx Ther Proc 10 - Details 5-20 to fatigue    Neuromuscular Re-education   Neuromuscular re-ed of mvmt, balance, coord, kinesthetic sense, posture, proprioception minutes (27244) 15   PTRx Neuro Re-ed 1 Supine MET For Anterior Posterior Innominate Rotation   PTRx Neuro Re-ed 1 - Details 5 x 5 sec hold (50% effort)   PTRx Neuro Re-ed 2 Bridging #1   PTRx Neuro Re-ed 2 - Details 5-10 x 5 sec   PTRx Neuro Re-ed 3 Supine Abdominal Exercise #3B (Two Leg Marching)   PTRx Neuro Re-ed 3 - Details 10-20 bilateral legs to abdominal fatigue with towel rollunder back for support   PTRx Neuro Re-ed 4 Ball Exercise Supine Bridging   PTRx Neuro Re-ed 4 - Details 10 x 5 sec (advancing to 15-30 sec hold with decreased reps)   Manual Therapy   Manual Therapy: Mobilization, MFR, MLD, friction massage minutes (96292) 10   Manual Therapy 1 B SI and lumbar paraspinal STM with hands with with cupping   Manual Therapy 1 - Details Prone   Plan   Home program PTRX   Updates to plan of care Addition Wall Sit   Plan for next session Advance strengthening   Total Session Time   Timed Code Treatment Minutes 40   Total Treatment Time (sum of timed and untimed services) 40       DISCHARGE  Reason for Discharge: Patient has failed to schedule further appointments.    Equipment Issued: none    Discharge Plan: Patient to continue home program.    Referring Provider:  Alva Whitaker

## 2023-10-03 NOTE — TELEPHONE ENCOUNTER
Received Bitvoret message from patient requesting refill of Belbuca.     Last refill: 9/7/2023  Last office visit: 9/6/2023  Scheduled for follow up 10/25/2023     Will route request to MD for review.     Reviewed [place]  Report.

## 2023-10-12 NOTE — NURSING NOTE
Is the patient currently in the state of [place]? YES    Visit mode:VIDEO    If the visit is dropped, the patient can be reconnected by: VIDEO VISIT: Text to cell phone:   Telephone Information:   Mobile [phone]     Will anyone else be joining the visit? NO    How would you like to obtain your AVS? MyChart    Are changes needed to the allergy or medication list? Pt stated no changes to allergies and Pt stated no med changes    Reason for visit: ANA Parsons LPN

## 2023-10-12 NOTE — PROGRESS NOTES
ONCOLOGY FOLLOW UP NOTE: 10/12/23        I took the history from reviewing the previous notes that she was following with Dr. Amaya.  I have copied and updated from prior notes.    ONCOLOGY History:    1.  January 2006:  Diagnosed with Stage IIB, T2 N1 M0 invasive lobular carcinoma of the right breast.  Final pathology showed a 4.5 x 3.8 x 2.5 cm, 01/14 lymph nodes positive.  Estrogen, progesterone receptor positive, HER-2 negative.     2.  Genetics.  BRCA1 and 2 mutations not detected. 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       THERAPY TO DATE:   1.  2006:  ECOG 2104 protocol of dose-dense Adriamycin, Cytoxan and Avastin x4 cycles followed by Taxol and Avastin x4 cycles.   2.  03/2007:  Completed 1 year Avastin.   3.  11/2006-01/2007:  Radiotherapy to the right breast of 5040 cGy.   4.  03/[id number]:  Aromasin.  Stopped after moving to the [facility].   5.  01/2016:  Right modified radical mastectomy with latissimus dorsi flap reconstruction and a left prophylactic mastectomy with latissimus dorsi flap reconstruction.     She recently had MRI of the brain as a follow-up for multiple sclerosis and there were multiple calvarial lesions noted which was suspicious for metastatic disease.  There was no evidence of new multiple sclerosis lesions.  She had a PET scan on 8/30/2019 which showed widespread bone metastatic lesions (calvarium, spine, sacrum, pelvis, ribs most prominent at C7, T3, L1 and L4), hypermetabolic 3 cm lesion in LLL, and mediastinal/hilar LN. CEA wnl at 1.9 and C27-29 elevated to 415 (28 on 8/23/16). A CT guided biopsy of the right iliac bone was obtained on 9/4/19, pathology consistent with metastatic adenocarcinoma (breast), ER/DE negative, HER-2 negative PDL 1 < 1%. A second biopsy was take of the LLL nodule via EBUS on 9/5/19 did not show any malignancy.    C/T/L spine MRI 8/3/19 to 9/2/19 with numerous enhancing lesions of the C, T and L spine, largest around T9 and L1. No evidence  of cord compression. Has a L1 compression fracture and impending L4.     Received radiation T12-L5 3000 cGy in 10 fractions from 9/6/2019 till 9/18/2019.  Neurosurgery recommended no surgical intervention but to wear Gorge brace when out of bed and HOB >30 degrees    She started palliative Xeloda 2000/1500 on 10/1/2019 but after just a few doses she noticed nausea, red eyes blurred vision, loss of appetite.  She stopped taking it after 5 doses and was seen by nurse practitioner on 10/7/2019 when she was improving.  At that point she was restarted on Xeloda at a lower dose of 1000 mg twice a day.  As she was not able to tolerate even the lower dose with extreme nausea and vomiting and feeling extremely fatigued and blurry vision, we decided on stopping Xeloda.    We decided on repeating scans and MRI brain on 10/25/2019 showed no intracranial metastatic disease.   Multiple enhancing calvarial lesions may be increased in number and are suggestive of metastatic disease. Thinner imaging on the current study may identify the smaller lesions and may be responsible for  identified more lesions.   There is a single focus of T2 hyperintense signal within the anterior right temporal lobe may represent interval demyelination. There is no evidence for active demyelination.    PET/CT on 11/1/2019 showed favorable response to therapy and Overall FDG uptake within scattered osseous metastases is decreased since 8/30/2019, particularly within the pelvis. Increased sclerotic appearance of L1 and L4 pathologic compression deformities, likely sequela of recently completed palliative radiation therapy.    No significant FDG uptake in previously demonstrated hypermetabolic mediastinal lymph nodes. Biopsy negative on 9/5/2019.  There is also decreased FDG uptake in the left lower lobe (biopsy negative for  malignancy), now with dense consolidation containing air bronchograms suggestive of infection versus aspiration.  There is diffuse  FDG uptake within the esophagus, consistent with esophagitis.    Because of intolerance to Xeloda we decided on switching to weekly paclitaxel on 11/5/2019.    C#2 Taxol 12/4/19- started with 70mg/m2 dose reduction    C#3 Taxol 12/30/2019     PET/CT on 1/24/2020 showed progression of the disease in some of the bone lesions including increase in size and lytic lesion within the vertebral body of C4 measuring 1 cm as opposed to 0.6 cm previously and a new central soft tissue nodule with SUV max 4.1 when previously it was non-hypermetabolic.  There is also some progression noted in the right proximal femur and right iliac bone.  There is decreased metabolic uptake in the right lamina of T9 with SUV max 4.7 when previously it was 8.0.  Other lesions are stable.    CA 27-29 also had increased significantly and it was 257 on 1/28/2020.    We decided on switching to eribulin on 1/28/2020.    C#2 2/18/2020  C#2 D#8 2/25/2020    C#3 D#1 3/18/2020 -delayed by 1 week as per patient's preference because she wanted to visit her family.    She had a repeat PET/CT on 3/27/2020 which showed stable size of the bone metastatic lesions although the FDG avidity was less, consistent with treatment response.    She had MRI of the thoracic spine on 4/3/2020 and it was compared to the one which was done in August 2019 and it showed increased size of several metastatic lesions most notably at T9 but also at T5-T7.  Other lesions at T10, T11 and T12 appeared improved.    CA 27-29 was also down to 222 on 3/18/2020.    Cycle #4 eribulin ( dose reduced to 1.2 mg/m2 )-4/7/2020-   Cycle #5 Eribulin ( 1.2 mg/m2 )- 4/28/2020   Cycle #6 Eribulin ( 1.2 mg/m2 )- 5/19/2020     Cycle #7 Eribulin ( 1.2 mg/m2 )- 6/9/2020    Cycle #8 Eribulin ( 1.2 mg/m2 )- 6/30/2020     She had a repeat PET scan on 7/17/2020 which showed incidental finding of new acute bilateral pulmonary embolism in the distal left main pulmonary artery extending in the left upper and  lower lobes and in the segmental and branch arteries in the right lower lobe.    It also showed mildly increased FDG avidity in the lytic lesion in the T9 lamina and right pedicle with SUV max 5.3, previously 3.9.  That is also slightly increased FDG uptake to the left anterior fifth rib at the costochondral junction SUV max 3.9, previously 2.5.  There is slightly decreased FDG uptake in the right femoral neck lesion SUV max 2.2, previously 2.9.  FDG uptake in other bone lesions is fairly stable.  No new metastasis are seen.    CA 27-29 was 308 on 6/30/2020.  It was 286 on 5/19/2020.    Overall this is consistent with only slight progression versus stable disease.    She was started on Lovenox.    Cycle #9 eribulin 7/21/2020. CA 27-29 was 238    C#10 eribulin 8/18/2020. ( delayed by one week for ANC 0.9 )    C#11 Eribulin 9/8/2020    C#12 Eribulin 9/29/2020     C#13 Eribulin 10/20/2020     PET scan on 11/6/2020 shows progression of the disease with increased FDG uptake in the lytic lesions in the T9/T10 and right posterolateral elements as well as increased FDG uptake in the previously known hypermetabolic right iliac bone lesion suggesting recurrence of previously treated metastasis.  There is new subtle lesion in the right manubrium.  There is also a new fracture in the anterolateral left fourth rib with associated uptake and this could be a pathologic fracture.  Healing fracture in the left anterior fifth rib lesion.  There is resolution of the left pulmonary emboli.  Decreased FDG uptake of the esophagus consistent with improving inflammation.    CA 27-29 on 10/20/2020 was also elevated at 489.    C#1 Trodelvy on 11/11/2020.  Cycle #2 Trodelvy 12/2/2020  Cycle number 2-day #8 Trodelvy 12/8/2020.    12/15/2020-12/16/2020.  She was admitted to the hospital with nausea vomiting and dehydration.  She had tachycardia and initial lactic acid of 5.  It decreased to 1.4 after 2 L of normal saline.  She also had  hypokalemia and ANC was 1.3.  She was treated with IV fluids.  Procalcitonin was negative.  CTA of the chest was negative for pulmonary embolism or pneumonia.  No bacterial infection was documented.  Her medication which she was initially unable to tolerate at home, were restarted and she was discharged home once started to feel better.      We delayed the start of cycle number 3 x 1 week and decrease the dose of chemotherapy by 25%.  She also had neutropenia with ANC of 1.0.      She started cycle number 3-day #1 on 12/29/2020.  CA 27-29 was 392.    Cycle number 3-day #8 1/5/2021.    C#4 Trodelvy 1/20/2021- 75% dose    2/5/2021.  PET/CT overall shows a good response to treatment with improvement of previously hypermetabolic lesions in the spine and pelvis and stability of other bone meta stasis.  There is a single lytic lesion in the right iliac bone which demonstrates slight Yimi increased FDG uptake and has SUV max 4.7 while previously it was SUV max 3.4.  There was diffuse wall thickening of the esophagus with uptake in the esophagus in the fundus of the stomach and this could be seen with inflammation.    Trodelvy cycle #5 - 2/10/2021.  75% of the dose.  CA 27-29 was 337.    Trodelvy cycle #6 - 3/3/2021.  75% of the dose.  Trodelvy cycle #6, D#8 - 3/10/2021.  75% of the dose.    Trodelvy C#8, D#8 on 4/21/2021  CA 27-29 stable at 371 on 4/14/2021    Trodelvy, cycle #9- 5/5/2021        On 2/25/2020 when she had biopsy of the right acetabulum and it was consistent with metastatic lobular carcinoma.  Again it was Triple Negative.     On 3/18/2020 when she also met with Dr. Arelis Arshad who also advised testing for androgen receptor studies on the biopsy specimen.  Tumor was diffusely androgen receptor positive.      5/26/2021-cycle #10 Trodelvy     CA 27-29 was 545.    6/11/2021.  PET/CT shows mildly increased uptake in several bone lesions for example in the left anterior iliac crest, mid right iliac crest and left  ischium.  Uptake in other bone lesions are similar to previously.    Because of minimal progression of the disease and she is tolerating chemotherapy very well, we decided on continuing the same chemotherapy.    6/16/2021-Trodelvy cycle #11    7/7/2021 -Trodelvy cycle #12    9/14/2021-Trodelvy-cycle #15, day #8.    9/8/2021-CA 27-29 was more elevated and it was 1176.    PET/CT on 9/24/2021 showed stable findings with no evidence of FDG avid metastatic disease within the body.  Left axillary lymph nodes are prominent and hypermetabolic and likely from recent vaccinations in the left deltoid muscle.  Healed bony metastasis seems stable.      Cycle #16, Trodelvy 9/28/2021.  Cycle #17, day #8 Trodelvy was on 10/26/2021.  Cycle #18, day #8 Trodelvy on 11/22/2021       She saw Dr. Mejia whom on 10/6/2021.  She was not considered eligible for Enfortumab trial.    Cycle #19, Trodelvy on 12/7/2021 12/21/2021.  PET/CT showed progression of bony metastatic disease.  There is increase hypermetabolism in both the right and left iliac bones and the sternum.  Other bone lesions are stable.    There is new scattered areas of groundglass throughout both the lungs and these findings are indeterminate but may represent an infectious etiology.  Interval resolution of left axillary FDG avid lymphadenopathy.    She was started on Casodex 150 mg daily on 1/6/2022.    She was admitted to the hospital from 3/17/2022-3/19/2022 for vomiting and diarrhea and  severe back pain.  I reviewed the discharge summary.  CT lumbar spine showed a stable severe chronic L1 pathologic compression fracture.  Stable mild compression deformity of superior endplate of L3 and superior endplate of L4.  The upper margin of the L4 fracture extends slightly into the spinal canal without high-grade canal stenosis and this is also stable.  Nondisplaced fractures coursing through the L3 pedicles bilaterally were seen which were not clearly seen previously this  could be acute and likely pathologic.  No extraspinal soft tissue abnormality.  Neurosurgery recommended conservative management.  Her pain was controlled and she was discharged home as she felt better with this.      3/31/2022-PET/CT shows progressive bone meta stasis with progressive FDG uptake in the following lesions. Left iliac crest lesion with max SUV of 7.2, previously 3.5. Right mid iliac crest lytic lesion shows maximum SUV of 7.8, previously 6.9.  T10 vertebral body mixed lytic and sclerotic lesion with an SUV of 7.1, previously not hypermetabolic. Thoracic vertebral bodies 8, 7, 5, and 4 also show hypermetabolic lesions were previously there was no hypermetabolism.  Some of the sclerotic osseous lesions are unchanged and do not show increased hypermetabolism compatible treated lesion such as a severe compression deformity at L1 as well as superior compression deformity at L4.  Medial right clavicle sclerotic lesion with SUV max of 4.8, previously not hypermetabolic. There is also right-sided sternal lesions.      4/14/2022--C#1- switch to single agent Doxil 50 mg per metered squared every 4 weeks.    5/13/2022-cycle #2- Doxil- dose reduced to 40 mg per metered square due to severe nausea/vomiting and migraines requiring IV fluids and IV antiemetics.    6/10/2022-cycle #3-Doxil 40 mg per metered square    7/1/2022- PET/CT shows resolution of the hypermetabolic skeletal metastasis.    7/6/2022-cycle #4-Doxil 40 mg per metered square  8/5/2022- cycle #5-Doxil  9/1/2022.  Cycle #6-Doxil  9/30/2022- cycle #7-Doxil    10/18/2022.  PET/CT does not show evidence of any FDG activity    11/2/2022-cycle #8-Doxil.  11/30/2022-cycle #9-Doxil.  12/28/2022-cycle #10-Doxil    2/3/2023- C#11- Doxil (delayed by 1 week because neutrophil count was 0.9)-wanted to give her Onpro but at that time it was not approved by insurance.    3/3/2023.  Cycle #12 Doxil.  Given with Onpro.    Repeat PET/CT on 3/27/2023 overall showed  very stable findings.  PET/CT showed focal FDG avidity in the heart. This is in an expected location of AV node or mitral valve.  This is nonspecific.  We checked an echocardiogram on 4/20/2023 which was unremarkable.  She is asymptomatic.  Continue to monitor clinically.      3/31/2023.  Cycle #13 Doxil.  Given with Onpro.     4/12/2023-she presented to ED with increased migraine headaches and nausea and vomiting.  She was treated symptomatically and was discharged home after she started to feel better.      4/28/2023.  Cycle #14 Doxil.  Given with Onpro    Cycle #16 Doxil with Onpro 6/23/2023    Cycle #17 Doxil with Onpro 7/21/2023      CA 15-3 has been slowly rising.    Repeat PET/CT on 8/8/2023 showed pretty stable findings.    She has been having more pain in the lumbar spine/low back.    8/18/2023.  Cycle #18 Doxil with Onpro    9/15/2023.  Cycle #19 Doxil with Onpro.    10/16/2023.  Cycle #20 Doxil with Onpro is planned.      Interval history.  This is a video visit.    With the increased dose of buprenorphine buccal film, her lower back pain is about the same as before.  There is plan to do SI joint steroid injections tomorrow 10/13/2023.    She tolerated last chemotherapy well.  Had minimal nausea for which she did not require antiemetics.  No vomiting.  No diarrhea or constipation.  No new pain and no new swellings.  No fevers or shortness of breath.  Neuropathy is mild and the same.  Energy is fine.   She is taking calcium and vitamin D.    ECOG 1    ROS:  Rest of the comprehensive review of the system was negative.        I reviewed other history in epic as below.       PAST MEDICAL HISTORY:     1.  Breast cancer  2.  Multiple sclerosis.   3.  Depression.   4.  Migraines.   5.  Hypertension.   6.  Cholecystectomy and umbilical hernia repair.     SOCIAL HISTORY: She smoked for 5 years but quit many years ago.  Drinks alcohol socially.  She lives with her .  She is a teacher in middle school.      "  FAMILY HISTORY: She mentions that her mother had breast cancer at 49.  Both grandmothers had breast cancer but she is not sure of the age.  A couple of cousins have cancers.  Patient has 3 children who are healthy.    Current Outpatient Medications   Medication    apixaban ANTICOAGULANT (ELIQUIS ANTICOAGULANT) 5 MG tablet    Buprenorphine HCl (BELBUCA) 450 MCG FILM buccal film    busPIRone (BUSPAR) 15 MG tablet    calcium citrate-vitamin D (CITRACAL) 315-250 MG-UNIT TABS    cephALEXin (KEFLEX) 500 MG capsule    Cholecalciferol (VITAMIN D3 PO)    cyclobenzaprine (FLEXERIL) 5 MG tablet    galcanezumab-gnlm (EMGALITY) 120 MG/ML injection    LORazepam (ATIVAN) 0.5 MG tablet    metoclopramide (REGLAN) 5 MG tablet    morphine (MSIR) 15 MG IR tablet    OLANZapine (ZYPREXA) 5 MG tablet    ondansetron (ZOFRAN ODT) 4 MG ODT tab    propranolol ER (INDERAL LA) 80 MG 24 hr capsule    propranolol ER (INDERAL LA) 80 MG 24 hr capsule    traZODone (DESYREL) 50 MG tablet    ubrogepant (UBRELVY) 100 MG tablet    venlafaxine (EFFEXOR XR) 75 MG 24 hr capsule    vitamin B-2 (RIBOFLAVIN) 25 MG TABS tablet    ZOLMitriptan (ZOMIG) 5 MG nasal spray     No current facility-administered medications for this visit.     Facility-Administered Medications Ordered in Other Visits   Medication    heparin 100 unit/mL injection 5 mL    sodium chloride (PF) 0.9% PF flush 10 mL        Allergies   Allergen Reactions    Bactrim [Sulfamethoxazole W/Trimethoprim]      Internal bleeding    Copaxone [Glatiramer] Hives          PHYSICAL EXAMINATION:     Ht 1.575 m (5' 2\")   Wt 56.7 kg (125 lb)   BMI 22.86 kg/m    Wt Readings from Last 4 Encounters:   10/12/23 56.7 kg (125 lb)   09/15/23 58.3 kg (128 lb 8 oz)   09/14/23 57.6 kg (127 lb)   09/13/23 59.4 kg (131 lb)         Constitutional.  Looks well and in no apparent distress.   Eyes.  Without eye redness or apparent jaundice.   Respiratory.  Non labored breathing. Speaking in full sentences.    Skin.  No " concerning skin rashes on the skin visualized.   Neurological.  Is alert and oriented.  Psychiatric.  Mood and affect seem appropriate.      The rest of a comprehensive physical examination is deferred due to Public Health Emergency video visit restrictions.        Labs and Imaging.    Reviewed.    9/15/2023  CBC shows WBC 4.7.  Hemoglobin 12.8.  Platelets 196.      Chemistry unremarkable     CA 15-3 was 242    CA 15-3 was 193 on 8/18/2023 7/21/2023.    CA 15-3 was 186.    7/3/2023    CA 15-3 was 177 on 6/23/2023      CA 15-3 was 129 on 3/31/2023      CA 15-3 was 123 on 3/3/2023.      11/30/2022     CA 15-3 was 135.    9/30/2022  CA 15-3 was 159.  CA 27-29 was 1992 on 6/10/2022  CA 27-29 was 3370 on 5/13/2022.  It was 5307 on 4/14/2022 ferritin Doxil was started.      9/28/2021.      Iron studies, ferritin is 101, iron 51, TIBC 268 and iron saturation index 19%.    CT lumbar spine on 8/28/2023  IMPRESSION:  1.  No evidence for acute displaced fracture. Fractures of the L1, L3 and L4 vertebral bodies appear stable from 07/31/2023.  2.  No evidence of traumatic subluxation.  3.  Diffuse osseous metastatic disease.      PET/CT on 8/8/2023  COMPARISON: PET/CT 3/27/2023, CT cervical, thoracic, and lumbar spine  7/31/2023     FINDINGS:      HEAD/NECK:  There is no suspicious FDG uptake in the neck.  The paranasal sinuses are clear. The mastoid air cells are clear.  Unchanged subcutaneous soft tissue nodule in the occipital scalp  without suspicious activity. No suspicious activity in the brain  parenchyma.     No hypermetabolic lymph nodes are demonstrated in the neck.      The mucosal and deep spaces of the neck are unremarkable. The major  salivary glands are unremarkable. The thyroid is unremarkable. The  major vasculature of the neck are patent.     CHEST:  There is no suspicious FDG uptake in the chest. Mildly increased  uptake of the esophagus, most prominent at the distal esophagus.     The central  tracheobronchial tree is clear. No pleural effusion or  pneumothorax. No acute consolidation. 6 x 1 mm thickening at the left  major fissure without uptake which has been present on prior exams,  likely intrafissural lymph node versus scarring. No suspicious  pulmonary nodules. Dependent atelectasis.     No hypermetabolic lymph nodes are demonstrated in the chest. Partially  calcified mediastinal and hilar lymph nodes.     Heart size is within normal limits. No pericardial effusion. The  thoracic aorta and main pulmonary artery are within normal limits for  diameter. The esophagus is unremarkable. Left chest wall Port-A-Cath  terminates at the cavoatrial junction. Postoperative changes of  bilateral mastectomies and breast implants.     ABDOMEN AND PELVIS:  There is no suspicious FDG uptake in the abdomen or pelvis.     The liver is unremarkable. Cholecystectomy. Unchanged dilated common  bile duct likely related to prior cholecystectomy. Atrophic pancreas.  The spleen is unremarkable. The adrenal glands are unremarkable.     Symmetric enhancement of the kidneys. No hydronephrosis. Tiny right  renal cortical hypodensity which was present on 6/11/2021. The urinary  bladder is nondistended. Unchanged calcification in the uterus, likely  fibroid.     No hypermetabolic lymph nodes are demonstrated in the abdomen or  pelvis.     Normal caliber of the small and large bowel. Normal appendix. No free  air, free fluid, or fluid collection. Normal caliber of the abdominal  aorta.     BONES:   Multiple lytic and sclerotic osseous lesions throughout the axial and  proximal appendicular skeleton, some with mild uptake increased  compared to 3/27/2023. For example, a lytic lesion of the T8 vertebral  body demonstrates SUV max of 4.5 (previously 3.5) and a sclerotic and  lytic lesion of the right pelvis superior to the acetabulum  demonstrating SUV max of 3.9, which was previously below background  levels.     Unchanged superior  L1 compression fracture and mild L4 compression  deformity. Multiple bilateral chronic rib fracture deformities which  are unchanged. Unchanged chronic fracture of the left glenoid. No  significant spinal canal stenosis or spinal canal or spinal canal  involvement of metastasis.                                                                      IMPRESSION: In this 63-year-old woman with breast cancer with multiple  osseous metastases status post mastectomies, radiation, and  chemotherapy;  1.  Overall, the exam is felt to be stable with multiple lytic and  sclerotic lesions throughout the axial and proximal appendicular  skeleton not significantly changed, representing treated disease. Some  of the lesions demonstrate minimally increased uptake without clear  progression on CT, indeterminate if this represents technical factors  versus very early metastatic disease. No new osseous lesions.  Attention on follow-up.  2.  Stable severe chronic L1 compression fracture.  3.  Esophagitis, unchanged compared to 3/27/2023.    MRI of the lumbar spine on 7/31/2023  COMPARISON: PET/CT 03/27/2023.  TECHNIQUE: MRI Lumbar Spine without IV contrast.     FINDINGS: Examination is mildly limited by patient motion artifact.     Alignment is unchanged. Redemonstrated heterogeneous bone marrow signal throughout the visualized lumbosacral spine including numerous focal abnormal T1 hypointense and STIR hyperintense marrow replacing lesions, including confluent lesions at T12 and S2   vertebrae. Similar chronic L1 pathologic burst fracture with mild superior endplate retropulsion and severe anterior-central vertebral body height loss. Likewise, similar chronic L4 superior endplate fracture with associated mild retropulsion. Large   Schmorl's node at the L2 inferior endplate, as before. Vertical defects in the bilateral L3 pedicles are redemonstrated compatible with chronic pathologic fractures. Asymmetric STIR hyperintense signal  abnormality in the left sacral ala partially   visualized, which may represent additional confluent pathologic marrow replacement. Otherwise, remaining lumbar vertebral body heights are maintained. Conus signal is normal and terminates at L2. No perivertebral soft tissue edema. There is mild edema   within the bilateral multifidus and erector spinae muscles, which may indicate posttraumatic injury/strain. Superficial subcutaneous soft tissue edema may be posttraumatic or related to positioning. Prominence of bile duct may be related to   postcholecystectomy physiologic ectasia. Small/subcentimeter right renal cyst. Last fully formed disc is designated L5-S1. Multilevel disc degeneration and height loss, severe at L5-S1.     T12-L1: No disc without bulge or protrusion. Facet joints are normal. Mild L1 superior endplate retropulsion indents the ventral thecal sac without spinal canal stenosis. No foraminal stenosis.     L1-L2: No disc without bulge or protrusion. Facet joints are normal. No spinal canal or foraminal stenosis.     L2-L3: Bilobed left asymmetric diffuse disc bulge. Mild facet arthropathy with ligamentum flavum thickening. Mild left foraminal stenosis. No right foraminal or spinal canal stenosis.     L3-L4: Bilobed left asymmetric diffuse disc bulge. Mild facet arthropathy with ligamentum flavum thickening. In conjunction with superior endplate retropulsion, there is borderline mild central spinal canal stenosis and mild left subarticular recess   narrowing. Mild right and moderate left foraminal stenosis.     L4-L5: Small left foraminal disc protrusion. Moderate facet arthropathy. No spinal canal or foraminal stenosis.     L5-S1: No disc without bulge or protrusion. Facet joints are normal. No spinal canal or foraminal stenosis.                                                                      IMPRESSION:  1.  Extensive osseous metastases as well as chronic pathologic fractures at L1, L3, and L4, as  detailed.  2.  Partially visualized asymmetric signal abnormality in the left sacral ala may represent confluent pathologic marrow replacement. Marrow edema related to sacral fracture is difficult to exclude in the appropriate clinical context.  3.  Posterior paraspinous muscular edema can be compatible with posttraumatic injury/strain.  4.  Multilevel degenerative changes, as detailed.        7/6/2023.  MRI brain  IMPRESSION:  1.  No acute ischemia, mass/mass effect, or abnormal intracranial enhancement.   2.  Similar sequela from demyelinating disease.  3.  Osseous metastases, as before.      Echocardiogram 4/20/2023  Left ventricular size, wall motion and function are normal. The ejection  fraction is 60-65%.  Global right ventricular function is normal.  No hemodynamically significant valve abnormalities.  The inferior vena cava is normal.  No pericardial effusion.     This study was compared with the study from 4/7/22. No significant change.      3/27/2023.  PET/CT  1. No new suspicious FDG uptake within the skeleton.  2. Stable numerous non-FDG avid lytic and sclerotic osseous lesions  throughout the axial skeleton.  3. Stable nondisplaced fracture of the left acromion/glenoid, chronic  bilateral healing rib fractures, and compression deformities of the  thoracic and lumbar spine.  4. Hypermetabolic focus in the expected location of the AV node versus  mitral valve, this may represent a normal AV node versus mitral valve  pathology such as vegetations. Recommend follow-up with cardiac  Ultrasound.        11/30/2022-x-ray of the ribs of the left side  There are a few sclerotic lesion seen in the right anterior ribs that were seen on previous PET CTs. These appear to involve the fourth and fifth ribs. There are likely more subtle lesions that were   described on the PET CT scan. Irregularity of the distal end of the left ninth rib may be from metastatic disease or fracture on the oblique view which likely is  chronic.       10/18/2022-PET/CT showed no new suspicious FDG uptake within the skeleton.  Stable abnormal FDG avid lytic and sclerotic osseous lesions.  Mild diffuse FDG uptake throughout the large bowel without CT abnormality.    7/1/2022.  PET/CT shows diffuse sclerotic and lytic osseous lesions without any abnormal FDG uptake in the skeleton, consistent with treated disease.  There is evaluation of previous hypermetabolic bone lesions.  Multiple chronic rib fracture deformities and stable compression fracture deformities of T6, L1 and L4.  No suspicious FDG uptake in the chest abdomen or pelvis.    3/31/2022-PET/CT shows progressive bone metastasis with progressive FDG uptake in the following lesions. Left iliac crest lesion with max SUV of 7.2, previously 3.5. Right mid iliac crest lytic lesion shows maximum SUV of 7.8, previously 6.9.  T10 vertebral body mixed lytic and sclerotic lesion with an SUV of 7.1, previously not hypermetabolic. Thoracic vertebral bodies 8, 7, 5, and 4 also show hypermetabolic lesions were previously there was no hypermetabolism.  Some of the sclerotic osseous lesions are unchanged and do not show increased hypermetabolism compatible treated lesion such as a severe compression deformity at L1 as well as superior compression deformity at L4.  Medial right clavicle sclerotic lesion with SUV max of 4.8, previously not hypermetabolic. There is also right-sided sternal lesions.      Biopsy from the right acetabulum shows metastatic lobular carcinoma.  It is triple negative.  Androgen receptor was diffusely positive (90%, moderate intensity) by immunohistochemistry. )  PD-L1 negative.    Foundation one showed CDH1 mutation (initially lobular cancer), CCND1 amplification ( equivocal ). FGF3, FGF4, FGF19 amplification ( Equivocal ). Most promising is CCND1 amplification with abemaciclib showing response in 4/10 patients. FGF3,FGF4 and FGF19 are targetable with pan-FGFR inhibitors.            ASSESSMENT AND PLAN:     Metastatic breast cancer negative breast cancer (androgen receptor positive ) with extensive involvement of the bones.  There is also a left lower lobe hypermetabolic lesion and mediastinal lymph nodes which are hypermetabolic.  The biopsy from the right iliac bone is consistent with metastatic lobular breast cancer but it is hormone receptor and HER-2 negative and PDL 1 is also negative.    Foundation 1 testing did not reveal any actionable mutations.    She was intolerant to Xeloda and progressed on Taxol and eribulin.      We then switched to recently FDA approved sacituzumab govitecan-hziy (Trodelvy) for TNBC, based on the results of the phase II IMMU-132-01 clinical trial.   She started this on 11/11/2020.      She obtained a second opinion from Dr. Castillo from [facility] who recommended a repeat biopsy to be done to make sure that she really has triple negative breast cancer.      She also met with Dr. Arelis Arshad on 3/18/2021.      After 10 cycles of Trodelvy, she had PET/CT on 6/11/2021 which showed mildly increased uptake in some of the bone lesions while stability in other bone lesions.        After 15 cycles, repeat PET scan showed stable findings.  CA 27-29 has been increasing and it is 1176.      As she was tolerating the chemotherapy well and her quality of life has been decent and scans were stable although she had a rising CA 27-29, we decided on continuing the same chemotherapy because it has been a very slow progression of the disease.    Then she had clear progression of the disease in the bones with increased FDG uptake in both right and left iliac bones and the sternum.    Foundation one showed CDH1 mutation (initially lobular cancer), CCND1 amplification ( equivocal ). FGF3, FGF4, FGF19 amplification ( Equivocal ). Most promising is CCND1 amplification with abemaciclib showing response in 4/10 patients. FGF3,FGF4 and FGF19 are targetable with  pan-FGFR inhibitor    As the tumor is diffusely positive for androgen receptor, we decided to start her on androgen receptor blockers.    I initially recommended enzalutamide 160 mg daily ( Enzalutamide for the Treatment of Androgen Receptor-Expressing Triple-Negative Breast Cancer----J Clin Oncol. 2018 Mar 20; 36(9): 884-890. ).    Eventually we decided to start her on Casodex 150 mg daily instead of Enzalutamide due to cost issues.  Clinical Trial Clin Cancer Res. 2013 Oct 1;19(19):5505-12.   Phase II trial of bicalutamide in patients with androgen receptor-positive, estrogen receptor-negative metastatic Breast Cancer  She started Casodex on 1/6/2022.    She had progressive disease in the bones on the PET scan on 3/31/2022.    We changed to single agent Doxil on 4/14/2022.      She developed significant nausea vomiting and headache so cycle #2 was given on 5/13/2022 with 20% dose reduction which she tolerated well.    Cycle #3 was given on 6/10/2022 with the same dose reduced Doxil.    Repeat PET/CT on 7/1/2022 shows resolution of the FDG avid bony metastasis consistent with treated disease.  No suspicious FDG uptake was seen in the chest abdomen and pelvis.  CA 27-29 was also coming down.     Repeat PET/CT in October 2022 after completing 7 cycles continued to show normal FDG uptake.  CA 15-3 was trending down     Cycle #11 was delayed by 1 week because of chemotherapy-induced neutropenia.  It was received on 2/3/2023.      She received cycle #12 on 3/3/2023 with Onpro.    Repeat PET/CT on 3/27/2023 overall showed very stable findings.    Cycle 13 was given on 3/31/2023 with Onpro support.      Cycle #14 was on 4/28/2023.   CA 15-3 was stable at 115.     CA 15-3 has been slowly rising.    Repeat PET/CT on 8/8/2023 showed pretty stable findings.    She has been having more pain in the lumbar spine/low back.    Otherwise tolerating chemotherapy well.    We discussed the situation in detail.  It seems that cancer  has started to progress very slowly based on the rising CA 15-3 but PET scan remains a stable.  Potentially we can continue with Doxil considering that it is keeping the cancer slowed down and she is tolerating this well.  On the other hand we can switch to another chemotherapy like carboplatin.  We discussed pros and cons of each approach and at this time decided to continue with Doxil and repeat PET/CT in 2 to 3 months.  We will continue to monitor CA 15-3 serially.    She has received 19 cycles up until now.  CA 15-3 continues to rise.  She will get cycle #20 Doxil on 10/16/2023 and we will repeat PET/CT on 11/10/2023.      We will decide further management after that.  Potentially other chemotherapies which can be used include carboplatin or gemcitabine.      Chemotherapy-induced neutropenia.  We will continue to give her Onpro.        Bone disease.  She has metastatic disease to the bone.  Previously she got palliative radiation to T12-L5 3000 cGy in 10 fractions from 9/6/2019 till 9/18/2019.  She was evaluated by orthopedics Dr. Bipin Elliott on 11/1/2019 and conservative management was recommended.    She was on Zometa every 3 months. She last received on 9/29/2020.  Because of progression of the disease in the bones, we switched to Xgeva which she received on 11/11/2020.    She had progression of the disease in the bones so we switched to Doxil but we continued xgeva.  PET scan on 7/1/2022 does not show any suspicious FDG uptake in the bones consistent with treated disease.  Repeat PET/CT in March 2023 was without any active FDG avid lesions.  PET/CT on 8/8/2023 overall showed pretty stable findings.  There has been slight increase in the FDG activity in a few places but without CT evidence of progression so it is indeterminate whether this represent technical factors versus a very early recurrence of active disease.  She will get Xgeva tomorrow.  Continue vitamin D and calcium.  We will repeat PET scan  next month.      Cancer related pain.  Following with palliative care.  Recently buprenorphine buccal film dose was increased which has helped.  She was also seen by Dr. Yusuf from pain clinic and she will get bilateral SI joint steroid injections tomorrow.       Migraines.  Doing much better with Emgality.  She takes as needed Ubrelvy which helps.  She is following with neurology.      Bilateral pulmonary emboli.  She will continue Eliquis.      We did not address the following today.      Shingles.  The rash has dried out.  She completed well with acyclovir and prednisone.  She takes gabapentin for postherpetic neuralgia and is doing better.      Neuropathy.  This remains mild and stable with neuropathy in her hands.    This is in addition to the chronic balance issues and heat and cold sensitivity from underlying multiple sclerosis which is also stable for years.  Continue to monitor.     Subcutaneous nodule in the scalp.  This looks like an epidermoid cyst.  She thinks it is a stable.  Continue to monitor.       Insomnia.  Continue trazodone.      Wall thickening of esophagus and FDG uptake of fundus of the stomach.  It could be in the setting of inflammation from recent nausea and vomiting.  Symptoms have resolved.  Continue to monitor clinically.    Vision changes.    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  It was thought that this could be due to Taxol as patient reported to the ophthalmologist that she was on Taxol in September 2019 when her symptoms started.  But she was not on Taxol in September 2019 when she started noticing the visual changes so Taxol is not responsible for this.  The first dose of Taxol was on 11/5/2019.   She had left cataract extraction on 2/8/2021 and she has noticed significant improvement in her vision.  Thinks that her vision is back to her usual.      Increased FDG ability in the colon.  She had FDG uptake in the cecum and ascending  colon but no corresponding lesion was seen on the CT scan.  She is completely asymptomatic so at this time we will continue to observe.    Discussion regarding healthcare directives.  On previous visit she told me that she will bring the health care directive for our records but she forgot so I told her to bring it on next visit.      Breast implant removal.  She tells me that she was planning to do breast implant removal because there was a recall on this.  Her surgery was scheduled for 9/24/2019.  Because of this new development of metastatic breast cancer and her recent radiation, surgery has been canceled.  We discussed that at this time treatment for metastatic breast cancer would take precedence over the other surgery as the other surgery would require her to be off chemotherapy for several weeks and in that case the chances of cancer progression would be high and I would not recommend that.    Multiple sclerosis.  She will continue to follow with her neurologist Dr. Quiles.  Currently multiple sclerosis is under control and currently she is not taking any medications.    Return to clinic as scheduled    All of her questions were answered to her satisfaction.  She is agreeable and comfortable with the plan.    Dewayne Zepeda MD

## 2023-10-12 NOTE — PROGRESS NOTES
Virtual Visit Details    Type of service:  Video Visit     Originating Location (pt. Location): Home    Distant Location (provider location):  On-site  Platform used for Video Visit: [employer]  Video start time. 8:49 AM  Video stop time. 8:54 AM

## 2023-10-12 NOTE — LETTER
10/12/2023         RE: Shaneka Alvarez  [id number] [facility] [address]        Dear Colleague,    Thank you for referring your patient, Shaneka Alvarez, to the [facility]. Please see a copy of my visit note below.    Virtual Visit Details    Type of service:  Video Visit     Originating Location (pt. Location): Home    Distant Location (provider location):  On-site  Platform used for Video Visit: [employer]  Video start time. 8:49 AM  Video stop time. 8:54 AM     ONCOLOGY FOLLOW UP NOTE: 10/12/23        I took the history from reviewing the previous notes that she was following with Dr. Amaya.  I have copied and updated from prior notes.    ONCOLOGY History:    1.  January 2006:  Diagnosed with Stage IIB, T2 N1 M0 invasive lobular carcinoma of the right breast.  Final pathology showed a 4.5 x 3.8 x 2.5 cm, 01/14 lymph nodes positive.  Estrogen, progesterone receptor positive, HER-2 negative.     2.  Genetics.  BRCA1 and 2 mutations not detected. 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       THERAPY TO DATE:   1.  2006:  ECOG 2104 protocol of dose-dense Adriamycin, Cytoxan and Avastin x4 cycles followed by Taxol and Avastin x4 cycles.   2.  03/2007:  Completed 1 year Avastin.   3.  11/2006-01/2007:  Radiotherapy to the right breast of 5040 cGy.   4.  [id number]:  Aromasin.  Stopped after moving to the [facility].   5.  01/2016:  Right modified radical mastectomy with latissimus dorsi flap reconstruction and a left prophylactic mastectomy with latissimus dorsi flap reconstruction.     She recently had MRI of the brain as a follow-up for multiple sclerosis and there were multiple calvarial lesions noted which was suspicious for metastatic disease.  There was no evidence of new multiple sclerosis lesions.  She had a PET scan on 8/30/2019 which showed widespread bone metastatic lesions (calvarium, spine, sacrum, pelvis, ribs most prominent at C7, T3, L1 and L4),  hypermetabolic 3 cm lesion in LLL, and mediastinal/hilar LN. CEA wnl at 1.9 and C27-29 elevated to 415 (28 on 8/23/16). A CT guided biopsy of the right iliac bone was obtained on 9/4/19, pathology consistent with metastatic adenocarcinoma (breast), ER/HI negative, HER-2 negative PDL 1 < 1%. A second biopsy was take of the LLL nodule via EBUS on 9/5/19 did not show any malignancy.    C/T/L spine MRI 8/3/19 to 9/2/19 with numerous enhancing lesions of the C, T and L spine, largest around T9 and L1. No evidence of cord compression. Has a L1 compression fracture and impending L4.     Received radiation T12-L5 3000 cGy in 10 fractions from 9/6/2019 till 9/18/2019.  Neurosurgery recommended no surgical intervention but to wear Gorge brace when out of bed and HOB >30 degrees    She started palliative Xeloda 2000/1500 on 10/1/2019 but after just a few doses she noticed nausea, red eyes blurred vision, loss of appetite.  She stopped taking it after 5 doses and was seen by nurse practitioner on 10/7/2019 when she was improving.  At that point she was restarted on Xeloda at a lower dose of 1000 mg twice a day.  As she was not able to tolerate even the lower dose with extreme nausea and vomiting and feeling extremely fatigued and blurry vision, we decided on stopping Xeloda.    We decided on repeating scans and MRI brain on 10/25/2019 showed no intracranial metastatic disease.   Multiple enhancing calvarial lesions may be increased in number and are suggestive of metastatic disease. Thinner imaging on the current study may identify the smaller lesions and may be responsible for  identified more lesions.   There is a single focus of T2 hyperintense signal within the anterior right temporal lobe may represent interval demyelination. There is no evidence for active demyelination.    PET/CT on 11/1/2019 showed favorable response to therapy and Overall FDG uptake within scattered osseous metastases is decreased since 8/30/2019,  particularly within the pelvis. Increased sclerotic appearance of L1 and L4 pathologic compression deformities, likely sequela of recently completed palliative radiation therapy.    No significant FDG uptake in previously demonstrated hypermetabolic mediastinal lymph nodes. Biopsy negative on 9/5/2019.  There is also decreased FDG uptake in the left lower lobe (biopsy negative for  malignancy), now with dense consolidation containing air bronchograms suggestive of infection versus aspiration.  There is diffuse FDG uptake within the esophagus, consistent with esophagitis.    Because of intolerance to Xeloda we decided on switching to weekly paclitaxel on 11/5/2019.    C#2 Taxol 12/4/19- started with 70mg/m2 dose reduction    C#3 Taxol 12/30/2019     PET/CT on 1/24/2020 showed progression of the disease in some of the bone lesions including increase in size and lytic lesion within the vertebral body of C4 measuring 1 cm as opposed to 0.6 cm previously and a new central soft tissue nodule with SUV max 4.1 when previously it was non-hypermetabolic.  There is also some progression noted in the right proximal femur and right iliac bone.  There is decreased metabolic uptake in the right lamina of T9 with SUV max 4.7 when previously it was 8.0.  Other lesions are stable.    CA 27-29 also had increased significantly and it was 257 on 1/28/2020.    We decided on switching to eribulin on 1/28/2020.    C#2 2/18/2020  C#2 D#8 2/25/2020    C#3 D#1 3/18/2020 -delayed by 1 week as per patient's preference because she wanted to visit her family.    She had a repeat PET/CT on 3/27/2020 which showed stable size of the bone metastatic lesions although the FDG avidity was less, consistent with treatment response.    She had MRI of the thoracic spine on 4/3/2020 and it was compared to the one which was done in August 2019 and it showed increased size of several metastatic lesions most notably at T9 but also at T5-T7.  Other lesions at  T10, T11 and T12 appeared improved.    CA 27-29 was also down to 222 on 3/18/2020.    Cycle #4 eribulin ( dose reduced to 1.2 mg/m2 )-4/7/2020-   Cycle #5 Eribulin ( 1.2 mg/m2 )- 4/28/2020   Cycle #6 Eribulin ( 1.2 mg/m2 )- 5/19/2020     Cycle #7 Eribulin ( 1.2 mg/m2 )- 6/9/2020    Cycle #8 Eribulin ( 1.2 mg/m2 )- 6/30/2020     She had a repeat PET scan on 7/17/2020 which showed incidental finding of new acute bilateral pulmonary embolism in the distal left main pulmonary artery extending in the left upper and lower lobes and in the segmental and branch arteries in the right lower lobe.    It also showed mildly increased FDG avidity in the lytic lesion in the T9 lamina and right pedicle with SUV max 5.3, previously 3.9.  That is also slightly increased FDG uptake to the left anterior fifth rib at the costochondral junction SUV max 3.9, previously 2.5.  There is slightly decreased FDG uptake in the right femoral neck lesion SUV max 2.2, previously 2.9.  FDG uptake in other bone lesions is fairly stable.  No new metastasis are seen.    CA 27-29 was 308 on 6/30/2020.  It was 286 on 5/19/2020.    Overall this is consistent with only slight progression versus stable disease.    She was started on Lovenox.    Cycle #9 eribulin 7/21/2020. CA 27-29 was 238    C#10 eribulin 8/18/2020. ( delayed by one week for ANC 0.9 )    C#11 Eribulin 9/8/2020    C#12 Eribulin 9/29/2020     C#13 Eribulin 10/20/2020     PET scan on 11/6/2020 shows progression of the disease with increased FDG uptake in the lytic lesions in the T9/T10 and right posterolateral elements as well as increased FDG uptake in the previously known hypermetabolic right iliac bone lesion suggesting recurrence of previously treated metastasis.  There is new subtle lesion in the right manubrium.  There is also a new fracture in the anterolateral left fourth rib with associated uptake and this could be a pathologic fracture.  Healing fracture in the left anterior fifth  rib lesion.  There is resolution of the left pulmonary emboli.  Decreased FDG uptake of the esophagus consistent with improving inflammation.    CA 27-29 on 10/20/2020 was also elevated at 489.    C#1 Trodelvy on 11/11/2020.  Cycle #2 Trodelvy 12/2/2020  Cycle number 2-day #8 Trodelvy 12/8/2020.    12/15/2020-12/16/2020.  She was admitted to the hospital with nausea vomiting and dehydration.  She had tachycardia and initial lactic acid of 5.  It decreased to 1.4 after 2 L of normal saline.  She also had hypokalemia and ANC was 1.3.  She was treated with IV fluids.  Procalcitonin was negative.  CTA of the chest was negative for pulmonary embolism or pneumonia.  No bacterial infection was documented.  Her medication which she was initially unable to tolerate at home, were restarted and she was discharged home once started to feel better.      We delayed the start of cycle number 3 x 1 week and decrease the dose of chemotherapy by 25%.  She also had neutropenia with ANC of 1.0.      She started cycle number 3-day #1 on 12/29/2020.  CA 27-29 was 392.    Cycle number 3-day #8 1/5/2021.    C#4 Trodelvy 1/20/2021- 75% dose    2/5/2021.  PET/CT overall shows a good response to treatment with improvement of previously hypermetabolic lesions in the spine and pelvis and stability of other bone meta stasis.  There is a single lytic lesion in the right iliac bone which demonstrates slight Yimi increased FDG uptake and has SUV max 4.7 while previously it was SUV max 3.4.  There was diffuse wall thickening of the esophagus with uptake in the esophagus in the fundus of the stomach and this could be seen with inflammation.    Trodelvy cycle #5 - 2/10/2021.  75% of the dose.  CA 27-29 was 337.    Trodelvy cycle #6 - 3/3/2021.  75% of the dose.  Trodelvy cycle #6, D#8 - 3/10/2021.  75% of the dose.    Trodelvy C#8, D#8 on 4/21/2021  CA 27-29 stable at 371 on 4/14/2021    Trodelvy, cycle #9- 5/5/2021        On 2/25/2020 when she had  biopsy of the right acetabulum and it was consistent with metastatic lobular carcinoma.  Again it was Triple Negative.     On 3/18/2020 when she also met with Dr. Arelis Arshad who also advised testing for androgen receptor studies on the biopsy specimen.  Tumor was diffusely androgen receptor positive.      5/26/2021-cycle #10 Trodelvy     CA 27-29 was 545.    6/11/2021.  PET/CT shows mildly increased uptake in several bone lesions for example in the left anterior iliac crest, mid right iliac crest and left ischium.  Uptake in other bone lesions are similar to previously.    Because of minimal progression of the disease and she is tolerating chemotherapy very well, we decided on continuing the same chemotherapy.    6/16/2021-Trodelvy cycle #11    7/7/2021 -Trodelvy cycle #12    9/14/2021-Trodelvy-cycle #15, day #8.    9/8/2021-CA 27-29 was more elevated and it was 1176.    PET/CT on 9/24/2021 showed stable findings with no evidence of FDG avid metastatic disease within the body.  Left axillary lymph nodes are prominent and hypermetabolic and likely from recent vaccinations in the left deltoid muscle.  Healed bony metastasis seems stable.      Cycle #16, Trodelvy 9/28/2021.  Cycle #17, day #8 Trodelvy was on 10/26/2021.  Cycle #18, day #8 Trodelvy on 11/22/2021       She saw Dr. Mejia whom on 10/6/2021.  She was not considered eligible for Enfortumab trial.    Cycle #19, Trodelvy on 12/7/2021 12/21/2021.  PET/CT showed progression of bony metastatic disease.  There is increase hypermetabolism in both the right and left iliac bones and the sternum.  Other bone lesions are stable.    There is new scattered areas of groundglass throughout both the lungs and these findings are indeterminate but may represent an infectious etiology.  Interval resolution of left axillary FDG avid lymphadenopathy.    She was started on Casodex 150 mg daily on 1/6/2022.    She was admitted to the hospital from 3/17/2022-3/19/2022 for  vomiting and diarrhea and  severe back pain.  I reviewed the discharge summary.  CT lumbar spine showed a stable severe chronic L1 pathologic compression fracture.  Stable mild compression deformity of superior endplate of L3 and superior endplate of L4.  The upper margin of the L4 fracture extends slightly into the spinal canal without high-grade canal stenosis and this is also stable.  Nondisplaced fractures coursing through the L3 pedicles bilaterally were seen which were not clearly seen previously this could be acute and likely pathologic.  No extraspinal soft tissue abnormality.  Neurosurgery recommended conservative management.  Her pain was controlled and she was discharged home as she felt better with this.      3/31/2022-PET/CT shows progressive bone meta stasis with progressive FDG uptake in the following lesions. Left iliac crest lesion with max SUV of 7.2, previously 3.5. Right mid iliac crest lytic lesion shows maximum SUV of 7.8, previously 6.9.  T10 vertebral body mixed lytic and sclerotic lesion with an SUV of 7.1, previously not hypermetabolic. Thoracic vertebral bodies 8, 7, 5, and 4 also show hypermetabolic lesions were previously there was no hypermetabolism.  Some of the sclerotic osseous lesions are unchanged and do not show increased hypermetabolism compatible treated lesion such as a severe compression deformity at L1 as well as superior compression deformity at L4.  Medial right clavicle sclerotic lesion with SUV max of 4.8, previously not hypermetabolic. There is also right-sided sternal lesions.      4/14/2022--C#1- switch to single agent Doxil 50 mg per metered squared every 4 weeks.    5/13/2022-cycle #2- Doxil- dose reduced to 40 mg per metered square due to severe nausea/vomiting and migraines requiring IV fluids and IV antiemetics.    6/10/2022-cycle #3-Doxil 40 mg per metered square    7/1/2022- PET/CT shows resolution of the hypermetabolic skeletal metastasis.    7/6/2022-cycle  #4-Doxil 40 mg per metered square  8/5/2022- cycle #5-Doxil  9/1/2022.  Cycle #6-Doxil  9/30/2022- cycle #7-Doxil    10/18/2022.  PET/CT does not show evidence of any FDG activity    11/2/2022-cycle #8-Doxil.  11/30/2022-cycle #9-Doxil.  12/28/2022-cycle #10-Doxil    2/3/2023- C#11- Doxil (delayed by 1 week because neutrophil count was 0.9)-wanted to give her Onpro but at that time it was not approved by insurance.    3/3/2023.  Cycle #12 Doxil.  Given with Onpro.    Repeat PET/CT on 3/27/2023 overall showed very stable findings.  PET/CT showed focal FDG avidity in the heart. This is in an expected location of AV node or mitral valve.  This is nonspecific.  We checked an echocardiogram on 4/20/2023 which was unremarkable.  She is asymptomatic.  Continue to monitor clinically.      3/31/2023.  Cycle #13 Doxil.  Given with Onpro.     4/12/2023-she presented to ED with increased migraine headaches and nausea and vomiting.  She was treated symptomatically and was discharged home after she started to feel better.      4/28/2023.  Cycle #14 Doxil.  Given with Onpro    Cycle #16 Doxil with Onpro 6/23/2023    Cycle #17 Doxil with Onpro 7/21/2023      CA 15-3 has been slowly rising.    Repeat PET/CT on 8/8/2023 showed pretty stable findings.    She has been having more pain in the lumbar spine/low back.    8/18/2023.  Cycle #18 Doxil with Onpro    9/15/2023.  Cycle #19 Doxil with Onpro.    10/16/2023.  Cycle #20 Doxil with Onpro is planned.      Interval history.  This is a video visit.    With the increased dose of buprenorphine buccal film, her lower back pain is about the same as before.  There is plan to do SI joint steroid injections tomorrow 10/13/2023.    She tolerated last chemotherapy well.  Had minimal nausea for which she did not require antiemetics.  No vomiting.  No diarrhea or constipation.  No new pain and no new swellings.  No fevers or shortness of breath.  Neuropathy is mild and the same.  Energy is fine.    She is taking calcium and vitamin D.    ECOG 1    ROS:  Rest of the comprehensive review of the system was negative.        I reviewed other history in epic as below.       PAST MEDICAL HISTORY:     1.  Breast cancer  2.  Multiple sclerosis.   3.  Depression.   4.  Migraines.   5.  Hypertension.   6.  Cholecystectomy and umbilical hernia repair.     SOCIAL HISTORY: She smoked for 5 years but quit many years ago.  Drinks alcohol socially.  She lives with her .  She is a teacher in middle school.       FAMILY HISTORY: She mentions that her mother had breast cancer at 49.  Both grandmothers had breast cancer but she is not sure of the age.  A couple of cousins have cancers.  Patient has 3 children who are healthy.    Current Outpatient Medications   Medication    apixaban ANTICOAGULANT (ELIQUIS ANTICOAGULANT) 5 MG tablet    Buprenorphine HCl (BELBUCA) 450 MCG FILM buccal film    busPIRone (BUSPAR) 15 MG tablet    calcium citrate-vitamin D (CITRACAL) 315-250 MG-UNIT TABS    cephALEXin (KEFLEX) 500 MG capsule    Cholecalciferol (VITAMIN D3 PO)    cyclobenzaprine (FLEXERIL) 5 MG tablet    galcanezumab-gnlm (EMGALITY) 120 MG/ML injection    LORazepam (ATIVAN) 0.5 MG tablet    metoclopramide (REGLAN) 5 MG tablet    morphine (MSIR) 15 MG IR tablet    OLANZapine (ZYPREXA) 5 MG tablet    ondansetron (ZOFRAN ODT) 4 MG ODT tab    propranolol ER (INDERAL LA) 80 MG 24 hr capsule    propranolol ER (INDERAL LA) 80 MG 24 hr capsule    traZODone (DESYREL) 50 MG tablet    ubrogepant (UBRELVY) 100 MG tablet    venlafaxine (EFFEXOR XR) 75 MG 24 hr capsule    vitamin B-2 (RIBOFLAVIN) 25 MG TABS tablet    ZOLMitriptan (ZOMIG) 5 MG nasal spray     No current facility-administered medications for this visit.     Facility-Administered Medications Ordered in Other Visits   Medication    heparin 100 unit/mL injection 5 mL    sodium chloride (PF) 0.9% PF flush 10 mL        Allergies   Allergen Reactions    Bactrim [Sulfamethoxazole  "W/Trimethoprim]      Internal bleeding    Copaxone [Glatiramer] Hives          PHYSICAL EXAMINATION:     Ht 1.575 m (5' 2\")   Wt 56.7 kg (125 lb)   BMI 22.86 kg/m    Wt Readings from Last 4 Encounters:   10/12/23 56.7 kg (125 lb)   09/15/23 58.3 kg (128 lb 8 oz)   09/14/23 57.6 kg (127 lb)   09/13/23 59.4 kg (131 lb)         Constitutional.  Looks well and in no apparent distress.   Eyes.  Without eye redness or apparent jaundice.   Respiratory.  Non labored breathing. Speaking in full sentences.    Skin.  No concerning skin rashes on the skin visualized.   Neurological.  Is alert and oriented.  Psychiatric.  Mood and affect seem appropriate.      The rest of a comprehensive physical examination is deferred due to [facility] video visit restrictions.        Labs and Imaging.    Reviewed.    9/15/2023  CBC shows WBC 4.7.  Hemoglobin 12.8.  Platelets 196.      Chemistry unremarkable     CA 15-3 was 242    CA 15-3 was 193 on 8/18/2023 7/21/2023.    CA 15-3 was 186.    7/3/2023    CA 15-3 was 177 on 6/23/2023      CA 15-3 was 129 on 3/31/2023      CA 15-3 was 123 on 3/3/2023.      11/30/2022     CA 15-3 was 135.    9/30/2022  CA 15-3 was 159.  CA 27-29 was 1992 on 6/10/2022  CA 27-29 was 3370 on 5/13/2022.  It was 5307 on 4/14/2022 ferritin Doxil was started.      9/28/2021.      Iron studies, ferritin is 101, iron 51, TIBC 268 and iron saturation index 19%.    CT lumbar spine on 8/28/2023  IMPRESSION:  1.  No evidence for acute displaced fracture. Fractures of the L1, L3 and L4 vertebral bodies appear stable from 07/31/2023.  2.  No evidence of traumatic subluxation.  3.  Diffuse osseous metastatic disease.      PET/CT on 8/8/2023  COMPARISON: PET/CT 3/27/2023, CT cervical, thoracic, and lumbar spine  7/31/2023     FINDINGS:      HEAD/NECK:  There is no suspicious FDG uptake in the neck.  The paranasal sinuses are clear. The mastoid air cells are clear.  Unchanged subcutaneous soft tissue nodule " in the occipital scalp  without suspicious activity. No suspicious activity in the brain  parenchyma.     No hypermetabolic lymph nodes are demonstrated in the neck.      The mucosal and deep spaces of the neck are unremarkable. The major  salivary glands are unremarkable. The thyroid is unremarkable. The  major vasculature of the neck are patent.     CHEST:  There is no suspicious FDG uptake in the chest. Mildly increased  uptake of the esophagus, most prominent at the distal esophagus.     The central tracheobronchial tree is clear. No pleural effusion or  pneumothorax. No acute consolidation. 6 x 1 mm thickening at the left  major fissure without uptake which has been present on prior exams,  likely intrafissural lymph node versus scarring. No suspicious  pulmonary nodules. Dependent atelectasis.     No hypermetabolic lymph nodes are demonstrated in the chest. Partially  calcified mediastinal and hilar lymph nodes.     Heart size is within normal limits. No pericardial effusion. The  thoracic aorta and main pulmonary artery are within normal limits for  diameter. The esophagus is unremarkable. Left chest wall Port-A-Cath  terminates at the cavoatrial junction. Postoperative changes of  bilateral mastectomies and breast implants.     ABDOMEN AND PELVIS:  There is no suspicious FDG uptake in the abdomen or pelvis.     The liver is unremarkable. Cholecystectomy. Unchanged dilated common  bile duct likely related to prior cholecystectomy. Atrophic pancreas.  The spleen is unremarkable. The adrenal glands are unremarkable.     Symmetric enhancement of the kidneys. No hydronephrosis. Tiny right  renal cortical hypodensity which was present on 6/11/2021. The urinary  bladder is nondistended. Unchanged calcification in the uterus, likely  fibroid.     No hypermetabolic lymph nodes are demonstrated in the abdomen or  pelvis.     Normal caliber of the small and large bowel. Normal appendix. No free  air, free fluid, or  fluid collection. Normal caliber of the abdominal  aorta.     BONES:   Multiple lytic and sclerotic osseous lesions throughout the axial and  proximal appendicular skeleton, some with mild uptake increased  compared to 3/27/2023. For example, a lytic lesion of the T8 vertebral  body demonstrates SUV max of 4.5 (previously 3.5) and a sclerotic and  lytic lesion of the right pelvis superior to the acetabulum  demonstrating SUV max of 3.9, which was previously below background  levels.     Unchanged superior L1 compression fracture and mild L4 compression  deformity. Multiple bilateral chronic rib fracture deformities which  are unchanged. Unchanged chronic fracture of the left glenoid. No  significant spinal canal stenosis or spinal canal or spinal canal  involvement of metastasis.                                                                      IMPRESSION: In this 63-year-old woman with breast cancer with multiple  osseous metastases status post mastectomies, radiation, and  chemotherapy;  1.  Overall, the exam is felt to be stable with multiple lytic and  sclerotic lesions throughout the axial and proximal appendicular  skeleton not significantly changed, representing treated disease. Some  of the lesions demonstrate minimally increased uptake without clear  progression on CT, indeterminate if this represents technical factors  versus very early metastatic disease. No new osseous lesions.  Attention on follow-up.  2.  Stable severe chronic L1 compression fracture.  3.  Esophagitis, unchanged compared to 3/27/2023.    MRI of the lumbar spine on 7/31/2023  COMPARISON: PET/CT 03/27/2023.  TECHNIQUE: MRI Lumbar Spine without IV contrast.     FINDINGS: Examination is mildly limited by patient motion artifact.     Alignment is unchanged. Redemonstrated heterogeneous bone marrow signal throughout the visualized lumbosacral spine including numerous focal abnormal T1 hypointense and STIR hyperintense marrow replacing  lesions, including confluent lesions at T12 and S2   vertebrae. Similar chronic L1 pathologic burst fracture with mild superior endplate retropulsion and severe anterior-central vertebral body height loss. Likewise, similar chronic L4 superior endplate fracture with associated mild retropulsion. Large   Schmorl's node at the L2 inferior endplate, as before. Vertical defects in the bilateral L3 pedicles are redemonstrated compatible with chronic pathologic fractures. Asymmetric STIR hyperintense signal abnormality in the left sacral ala partially   visualized, which may represent additional confluent pathologic marrow replacement. Otherwise, remaining lumbar vertebral body heights are maintained. Conus signal is normal and terminates at L2. No perivertebral soft tissue edema. There is mild edema   within the bilateral multifidus and erector spinae muscles, which may indicate posttraumatic injury/strain. Superficial subcutaneous soft tissue edema may be posttraumatic or related to positioning. Prominence of bile duct may be related to   postcholecystectomy physiologic ectasia. Small/subcentimeter right renal cyst. Last fully formed disc is designated L5-S1. Multilevel disc degeneration and height loss, severe at L5-S1.     T12-L1: No disc without bulge or protrusion. Facet joints are normal. Mild L1 superior endplate retropulsion indents the ventral thecal sac without spinal canal stenosis. No foraminal stenosis.     L1-L2: No disc without bulge or protrusion. Facet joints are normal. No spinal canal or foraminal stenosis.     L2-L3: Bilobed left asymmetric diffuse disc bulge. Mild facet arthropathy with ligamentum flavum thickening. Mild left foraminal stenosis. No right foraminal or spinal canal stenosis.     L3-L4: Bilobed left asymmetric diffuse disc bulge. Mild facet arthropathy with ligamentum flavum thickening. In conjunction with superior endplate retropulsion, there is borderline mild central spinal canal  stenosis and mild left subarticular recess   narrowing. Mild right and moderate left foraminal stenosis.     L4-L5: Small left foraminal disc protrusion. Moderate facet arthropathy. No spinal canal or foraminal stenosis.     L5-S1: No disc without bulge or protrusion. Facet joints are normal. No spinal canal or foraminal stenosis.                                                                      IMPRESSION:  1.  Extensive osseous metastases as well as chronic pathologic fractures at L1, L3, and L4, as detailed.  2.  Partially visualized asymmetric signal abnormality in the left sacral ala may represent confluent pathologic marrow replacement. Marrow edema related to sacral fracture is difficult to exclude in the appropriate clinical context.  3.  Posterior paraspinous muscular edema can be compatible with posttraumatic injury/strain.  4.  Multilevel degenerative changes, as detailed.        7/6/2023.  MRI brain  IMPRESSION:  1.  No acute ischemia, mass/mass effect, or abnormal intracranial enhancement.   2.  Similar sequela from demyelinating disease.  3.  Osseous metastases, as before.      Echocardiogram 4/20/2023  Left ventricular size, wall motion and function are normal. The ejection  fraction is 60-65%.  Global right ventricular function is normal.  No hemodynamically significant valve abnormalities.  The inferior vena cava is normal.  No pericardial effusion.     This study was compared with the study from 4/7/22. No significant change.      3/27/2023.  PET/CT  1. No new suspicious FDG uptake within the skeleton.  2. Stable numerous non-FDG avid lytic and sclerotic osseous lesions  throughout the axial skeleton.  3. Stable nondisplaced fracture of the left acromion/glenoid, chronic  bilateral healing rib fractures, and compression deformities of the  thoracic and lumbar spine.  4. Hypermetabolic focus in the expected location of the AV node versus  mitral valve, this may represent a normal AV node versus  mitral valve  pathology such as vegetations. Recommend follow-up with cardiac  Ultrasound.        11/30/2022-x-ray of the ribs of the left side  There are a few sclerotic lesion seen in the right anterior ribs that were seen on previous PET CTs. These appear to involve the fourth and fifth ribs. There are likely more subtle lesions that were   described on the PET CT scan. Irregularity of the distal end of the left ninth rib may be from metastatic disease or fracture on the oblique view which likely is chronic.       10/18/2022-PET/CT showed no new suspicious FDG uptake within the skeleton.  Stable abnormal FDG avid lytic and sclerotic osseous lesions.  Mild diffuse FDG uptake throughout the large bowel without CT abnormality.    7/1/2022.  PET/CT shows diffuse sclerotic and lytic osseous lesions without any abnormal FDG uptake in the skeleton, consistent with treated disease.  There is evaluation of previous hypermetabolic bone lesions.  Multiple chronic rib fracture deformities and stable compression fracture deformities of T6, L1 and L4.  No suspicious FDG uptake in the chest abdomen or pelvis.    3/31/2022-PET/CT shows progressive bone metastasis with progressive FDG uptake in the following lesions. Left iliac crest lesion with max SUV of 7.2, previously 3.5. Right mid iliac crest lytic lesion shows maximum SUV of 7.8, previously 6.9.  T10 vertebral body mixed lytic and sclerotic lesion with an SUV of 7.1, previously not hypermetabolic. Thoracic vertebral bodies 8, 7, 5, and 4 also show hypermetabolic lesions were previously there was no hypermetabolism.  Some of the sclerotic osseous lesions are unchanged and do not show increased hypermetabolism compatible treated lesion such as a severe compression deformity at L1 as well as superior compression deformity at L4.  Medial right clavicle sclerotic lesion with SUV max of 4.8, previously not hypermetabolic. There is also right-sided sternal lesions.      Biopsy  from the right acetabulum shows metastatic lobular carcinoma.  It is triple negative.  Androgen receptor was diffusely positive (90%, moderate intensity) by immunohistochemistry. )  PD-L1 negative.    Foundation one showed CDH1 mutation (initially lobular cancer), CCND1 amplification ( equivocal ). FGF3, FGF4, FGF19 amplification ( Equivocal ). Most promising is CCND1 amplification with abemaciclib showing response in 4/10 patients. FGF3,FGF4 and FGF19 are targetable with pan-FGFR inhibitors.           ASSESSMENT AND PLAN:     Metastatic breast cancer negative breast cancer (androgen receptor positive ) with extensive involvement of the bones.  There is also a left lower lobe hypermetabolic lesion and mediastinal lymph nodes which are hypermetabolic.  The biopsy from the right iliac bone is consistent with metastatic lobular breast cancer but it is hormone receptor and HER-2 negative and PDL 1 is also negative.    Foundation 1 testing did not reveal any actionable mutations.    She was intolerant to Xeloda and progressed on Taxol and eribulin.      We then switched to recently FDA approved sacituzumab govitecan-hziy (Trodelvy) for TNBC, based on the results of the phase II IMMU-132-01 clinical trial.   She started this on 11/11/2020.      She obtained a second opinion from Dr. Castillo from [facility] who recommended a repeat biopsy to be done to make sure that she really has triple negative breast cancer.      She also met with Dr. Arelis Arshad on 3/18/2021.      After 10 cycles of Trodelvy, she had PET/CT on 6/11/2021 which showed mildly increased uptake in some of the bone lesions while stability in other bone lesions.        After 15 cycles, repeat PET scan showed stable findings.  CA 27-29 has been increasing and it is 1176.      As she was tolerating the chemotherapy well and her quality of life has been decent and scans were stable although she had a rising CA 27-29, we decided on continuing the  same chemotherapy because it has been a very slow progression of the disease.    Then she had clear progression of the disease in the bones with increased FDG uptake in both right and left iliac bones and the sternum.    Foundation one showed CDH1 mutation (initially lobular cancer), CCND1 amplification ( equivocal ). FGF3, FGF4, FGF19 amplification ( Equivocal ). Most promising is CCND1 amplification with abemaciclib showing response in 4/10 patients. FGF3,FGF4 and FGF19 are targetable with pan-FGFR inhibitor    As the tumor is diffusely positive for androgen receptor, we decided to start her on androgen receptor blockers.    I initially recommended enzalutamide 160 mg daily ( Enzalutamide for the Treatment of Androgen Receptor-Expressing Triple-Negative Breast Cancer----J Clin Oncol. 2018 Mar 20; 36(9): 884-890. ).    Eventually we decided to start her on Casodex 150 mg daily instead of Enzalutamide due to cost issues.  Clinical Trial Clin Cancer Res. 2013 Oct 1;19(19):5505-12.   Phase II trial of bicalutamide in patients with androgen receptor-positive, estrogen receptor-negative metastatic Breast Cancer  She started Casodex on 1/6/2022.    She had progressive disease in the bones on the PET scan on 3/31/2022.    We changed to single agent Doxil on 4/14/2022.      She developed significant nausea vomiting and headache so cycle #2 was given on 5/13/2022 with 20% dose reduction which she tolerated well.    Cycle #3 was given on 6/10/2022 with the same dose reduced Doxil.    Repeat PET/CT on 7/1/2022 shows resolution of the FDG avid bony metastasis consistent with treated disease.  No suspicious FDG uptake was seen in the chest abdomen and pelvis.  CA 27-29 was also coming down.     Repeat PET/CT in October 2022 after completing 7 cycles continued to show normal FDG uptake.  CA 15-3 was trending down     Cycle #11 was delayed by 1 week because of chemotherapy-induced neutropenia.  It was received on 2/3/2023.       She received cycle #12 on 3/3/2023 with Onpro.    Repeat PET/CT on 3/27/2023 overall showed very stable findings.    Cycle 13 was given on 3/31/2023 with Onpro support.      Cycle #14 was on 4/28/2023.   CA 15-3 was stable at 115.     CA 15-3 has been slowly rising.    Repeat PET/CT on 8/8/2023 showed pretty stable findings.    She has been having more pain in the lumbar spine/low back.    Otherwise tolerating chemotherapy well.    We discussed the situation in detail.  It seems that cancer has started to progress very slowly based on the rising CA 15-3 but PET scan remains a stable.  Potentially we can continue with Doxil considering that it is keeping the cancer slowed down and she is tolerating this well.  On the other hand we can switch to another chemotherapy like carboplatin.  We discussed pros and cons of each approach and at this time decided to continue with Doxil and repeat PET/CT in 2 to 3 months.  We will continue to monitor CA 15-3 serially.    She has received 19 cycles up until now.  CA 15-3 continues to rise.  She will get cycle #20 Doxil on 10/16/2023 and we will repeat PET/CT on 11/10/2023.      We will decide further management after that.  Potentially other chemotherapies which can be used include carboplatin or gemcitabine.      Chemotherapy-induced neutropenia.  We will continue to give her Onpro.        Bone disease.  She has metastatic disease to the bone.  Previously she got palliative radiation to T12-L5 3000 cGy in 10 fractions from 9/6/2019 till 9/18/2019.  She was evaluated by orthopedics Dr. Bipin Elliott on 11/1/2019 and conservative management was recommended.    She was on Zometa every 3 months. She last received on 9/29/2020.  Because of progression of the disease in the bones, we switched to Xgeva which she received on 11/11/2020.    She had progression of the disease in the bones so we switched to Doxil but we continued xgeva.  PET scan on 7/1/2022 does not show any  suspicious FDG uptake in the bones consistent with treated disease.  Repeat PET/CT in March 2023 was without any active FDG avid lesions.  PET/CT on 8/8/2023 overall showed pretty stable findings.  There has been slight increase in the FDG activity in a few places but without CT evidence of progression so it is indeterminate whether this represent technical factors versus a very early recurrence of active disease.  She will get Xgeva tomorrow.  Continue vitamin D and calcium.  We will repeat PET scan next month.      Cancer related pain.  Following with palliative care.  Recently buprenorphine buccal film dose was increased which has helped.  She was also seen by Dr. Yusuf from pain clinic and she will get bilateral SI joint steroid injections tomorrow.       Migraines.  Doing much better with Emgality.  She takes as needed Ubrelvy which helps.  She is following with neurology.      Bilateral pulmonary emboli.  She will continue Eliquis.      We did not address the following today.      Shingles.  The rash has dried out.  She completed well with acyclovir and prednisone.  She takes gabapentin for postherpetic neuralgia and is doing better.      Neuropathy.  This remains mild and stable with neuropathy in her hands.    This is in addition to the chronic balance issues and heat and cold sensitivity from underlying multiple sclerosis which is also stable for years.  Continue to monitor.     Subcutaneous nodule in the scalp.  This looks like an epidermoid cyst.  She thinks it is a stable.  Continue to monitor.     Insomnia.  Continue trazodone.      Wall thickening of esophagus and FDG uptake of fundus of the stomach.  It could be in the setting of inflammation from recent nausea and vomiting.  Symptoms have resolved.  Continue to monitor clinically.    Vision changes.    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  It was thought that this could be due to Taxol as patient reported  to the ophthalmologist that she was on Taxol in September 2019 when her symptoms started.  But she was not on Taxol in September 2019 when she started noticing the visual changes so Taxol is not responsible for this.  The first dose of Taxol was on 11/5/2019.   She had left cataract extraction on 2/8/2021 and she has noticed significant improvement in her vision.  Thinks that her vision is back to her usual.    Increased FDG ability in the colon.  She had FDG uptake in the cecum and ascending colon but no corresponding lesion was seen on the CT scan.  She is completely asymptomatic so at this time we will continue to observe.    Discussion regarding healthcare directives.  On previous visit she told me that she will bring the health care directive for our records but she forgot so I told her to bring it on next visit.      Breast implant removal.  She tells me that she was planning to do breast implant removal because there was a recall on this.  Her surgery was scheduled for 9/24/2019.  Because of this new development of metastatic breast cancer and her recent radiation, surgery has been canceled.  We discussed that at this time treatment for metastatic breast cancer would take precedence over the other surgery as the other surgery would require her to be off chemotherapy for several weeks and in that case the chances of cancer progression would be high and I would not recommend that.    Multiple sclerosis.  She will continue to follow with her neurologist Dr. Quiles.  Currently multiple sclerosis is under control and currently she is not taking any medications.    Return to clinic as scheduled    All of her questions were answered to her satisfaction.  She is agreeable and comfortable with the plan.    Dewayne Zepeda MD

## 2023-10-13 NOTE — DISCHARGE INSTRUCTIONS
Home Care Instructions after a Sacroiliac Joint Injection        Activity  -You may resume most normal activity levels with the exception of strenuous activity. It is important for us to know if your pain with normal activity is relieved after this injection.  -DO NOT shower for 24 hours  -DO NOT remove bandaid for 24 hours    Pain  -You may experience soreness at the injection site for one or two days  -You may use an ice pack for 20 minutes every 2 hours for the first 24 hours  -You may use a heating pad after the first 24 hours  -You may use Tylenol (acetaminophen) every 4 hours or other pain medicines as directed by your physician    You may experience numbness radiating into your legs or arms (depending on the procedure location). This numbness may last several hours. Until sensation returns to normal; please use caution in walking, climbing stairs, and stepping out of your vehicle, etc.    DID YOU RECEIVE STEROIDS TODAY?  Yes    Common side effects of steroids:  Not everyone will experience corticosteroid side effects. If side effects are experienced, they will gradually subside in the 7-10 day period following an injection. Most common side effects include:  -Flushed face and/or chest  -Feeling of warmth, particularly in the face but could be an overall feeling of warmth  -Increased blood sugar in diabetic patients  -Menstrual irregularities my occur. If taking hormone-based birth control an alternate method of birth control is recommended  -Sleep disturbances and/or mood swings are possible  -Leg cramps      PLEASE KEEP TRACK OF YOUR SYMPTOMS AND NOTE YOUR IMPROVEMENT FOR YOUR DOCTOR.     Please contact us if you have:  -Severe pain  -Fever more than 101.5 degrees Fahrenheit  -Signs of infection at the injection site (redness, swelling, or drainage)    FOR PAIN CENTER PATIENTS:  If you have questions, please contact the Pain Clinic at [phone] Option #1 between the hours of 7:00 am and 3:00 pm Monday  through Friday. After office hours you can contact the on call provider by dialing [phone]. If you need immediate attention, we recommend that you go to a hospital emergency room or dial [phone].      FOR PM&R PATIENTS:  For patients seen by the PM and R service, please call [phone]. (Monday through Friday 8a-5p.  After business hours and weekends call [phone] and ask for the PM and R doctor on call). If you need to fax a pain diary to PM&R the fax number is [phone]. If you are unable to fax, uploading to [employer] is encouraged, then send to provider. If you have procedure scheduling questions please call [phone] Option #2.

## 2023-10-16 NOTE — PROGRESS NOTES
Infusion Nursing Note:  Shaneka Alvarez presents today for C20 D1 Doxil, IVF, Onpro and Xgeva.    Patient seen by provider today: No   present during visit today: Not Applicable.    Note: Shaneka stated she had a steroid injection in her sacroiliac joint and developed a rash at the injection site, rash has since resolved and no itching.     Patient verified taking calcium and vitamin D supplements.     Premedications of Emend/Decadron given prior to chemotherapy.     Intravenous Access:  Implanted Port.    Treatment Conditions:  Lab Results   Component Value Date    HGB 11.6 (L) 10/16/2023    WBC 5.4 10/16/2023    ANEU 22.4 (H) 07/27/2023    ANEUTAUTO 3.5 10/16/2023     10/16/2023        Lab Results   Component Value Date     10/16/2023    POTASSIUM 4.0 10/16/2023    MAG 2.0 03/12/2023    CR 0.75 10/16/2023    RAFAELA 9.1 10/16/2023    BILITOTAL 0.2 10/16/2023    ALBUMIN 4.0 10/16/2023    ALT 14 10/16/2023    AST 28 10/16/2023       Results reviewed, labs MET treatment parameters, ok to proceed with treatment.    Post Infusion Assessment:  Patient tolerated infusion without incident.  Blood return noted pre and post infusion.  Site patent and intact, free from redness, edema or discomfort.  No evidence of extravasations.  Access discontinued per protocol.     ONPRO  Was placed on patient's: left side of abdomen.    Was placed at 1145 AM    Podpal used: Yes    ONPRO injector device Lot number: [id number]    Patient education included: what patient can expect after application, what colored lights mean on the device, when to remove device, when and where to call with questions or issues, all patients questions answered, and that Neulasta administration will occur at 2:45pm on 10/17/23.    Patient tolerated administration well.     Discharge Plan:   Discharge instructions reviewed with: Patient and Family.  Patient and/or family verbalized understanding of discharge instructions and all questions  answered.  Patient discharged in stable condition accompanied by: self and son.  Departure Mode: Ambulatory.  Future appts have been reviewed and crosschecked with appt note and plan.      Suellen Valdez RN

## 2023-10-20 NOTE — H&P
ABBREVIATED H&P [facility]      Patient Name: Shaneka Alvarez   MRN: [id number]   YOB: 1960     1. Reason for Procedure:  Procedure Summary       Date: 10/13/23 [place] [facility] [place] [facility]    Anesthesia Start:  Anesthesia Stop:     Procedure: Sacroiliac Joint Injection (Bilateral: Sacrum) Diagnosis:       Sacroiliac joint pain      Bilateral sacroiliitis (H24)      (Sacroiliac joint pain [M53.3])      (Bilateral sacroiliitis (H) [M46.1])    Providers: Natalie Yusuf MD Responsible Provider:     Anesthesia Type: Not recorded ASA Status: Not recorded            2. History:   Past Medical History:   Diagnosis Date    Breast cancer (H)     Age 44    Cataract     left eye    Chemotherapy induced nausea and vomiting 12/15/2020    Common migraine     Esophageal reflux     H/O bilateral mastectomy     Mild major depression (H24)     Multiple sclerosis (H)     Pulmonary embolism (H)        Comorbidities: None    Any history of sleep apnea? No    Any history of problems with sedation? No    3. Physical:    General: Normal  Skin:  Normal.  Respiratory: Clear to auscultation bilateral, no wheezing  Cardio:  Regular rate and rhythm  Abdomen: Soft, nontender, nondistended, no palpable masses.  Musculoskeletal: Normal  Neuro: Sensory exam normal, motor exam 5/5, bilateral upper and lower extremities         4. Current Medications (if not in Epic):   Current Outpatient Medications   Medication Sig Dispense Refill    apixaban ANTICOAGULANT (ELIQUIS ANTICOAGULANT) 5 MG tablet Take 1 tablet (5 mg) by mouth 2 times daily 60 tablet 11    Buprenorphine HCl (BELBUCA) 450 MCG FILM buccal film Place 1 Film (450 mcg) inside cheek every 12 hours 60 each 0    busPIRone (BUSPAR) 15 MG tablet Take 1 tablet (15 mg) by mouth 2 times daily 180 tablet 0    calcium citrate-vitamin D (CITRACAL) 315-250 MG-UNIT TABS Take 1 tablet by mouth  every morning       Cholecalciferol (VITAMIN D3 PO) Take 2,000 Units by mouth every morning       galcanezumab-gnlm (EMGALITY) 120 MG/ML injection Inject 240 mg subcutaneous first month and then inject 120 mg subcutaneous every 28 days (Patient taking differently: Inject 120 mg Subcutaneous every 28 days) 2 mL 11    LORazepam (ATIVAN) 0.5 MG tablet Take 1-2 tablets (0.5-1 mg) by mouth every 6 hours as needed for anxiety 6 tablet 0    metoclopramide (REGLAN) 5 MG tablet Take 1-2 tablets (5-10 mg) by mouth every 6 hours as needed (nausea/vomiting/migraine) 20 tablet 5    morphine (MSIR) 15 MG IR tablet Take 1-2 tablets (15-30 mg) by mouth every 3 hours as needed for pain 120 tablet 0    OLANZapine (ZYPREXA) 5 MG tablet Take 2.5-5 mg by mouth 2 times daily as needed for nausea      ondansetron (ZOFRAN ODT) 4 MG ODT tab Take 1 tablet (4 mg) by mouth every 8 hours as needed for nausea 10 tablet 0    propranolol ER (INDERAL LA) 80 MG 24 hr capsule Take 160 mg by mouth At Bedtime And 80 mg every morning      propranolol ER (INDERAL LA) 80 MG 24 hr capsule Take 1 capsule by mouth in the morning and 2 capsules by mouth at night if tolerated (Patient taking differently: Take 80 mg by mouth every morning  and 160 mg by mouth at night if tolerated) 270 capsule 3    traZODone (DESYREL) 50 MG tablet Take 1 tablet (50 mg) by mouth At Bedtime 30 tablet 2    ubrogepant (UBRELVY) 100 MG tablet Take 1 tablet (100 mg) by mouth at onset of headache (may repeat in 2 hours as needed. Max 200 mg in 24 hours.) 16 tablet 11    venlafaxine (EFFEXOR XR) 75 MG 24 hr capsule Take 3 capsules (225 mg) by mouth daily 270 capsule 3    vitamin B-2 (RIBOFLAVIN) 25 MG TABS tablet Take 1 tablet by mouth daily      ZOLMitriptan (ZOMIG) 5 MG nasal spray Spray 1 spray in nostril at onset of headache for migraine May repeat in 2 hours. Max 2 sprays/24 hours. 12 each 11        5. Allergies and Reactions:  is allergic to bactrim  [sulfamethoxazole-trimethoprim] and copaxone [glatiramer].

## 2023-10-20 NOTE — OP NOTE
Patient: Shaneka Alvarez Age: 63 year old   MRN: [id number] Attending: Dr. Yusuf     Date of Visit: October 13, 2023      PAIN MEDICINE CLINIC PROCEDURE NOTE    ATTENDING CLINICIAN:    Natalie Yusuf MD        PREPROCEDURE DIAGNOSES:  1.  Bilateral low back pain   2.  Sacroiliitis - bilateral    PROCEDURE(S) PERFORMED:  1.  bilateral sacroiliac joint injection  3.  Fluoroscopic guidance for the above-named procedure(s)      ANESTHESIA:  Local.    BLOOD LOSS:  Minimal.    DRAINS AND SPECIMENS:  None.    COMPLICATIONS:  None.    INDICATIONS:  Shaneka Alvarez is a 63 year old female with a history of  chronic low back pain secondary to sacrolitis .  The patient stated that the patient was in their usual state of health and denied recent anticoagulant use or recent infections.  Therefore, the plan is to perform above mentioned procedures.     Procedure Details:  The patient was met in the procedure room, where the patient was identified by name, medical record number and date of birth.  All of the patient s last minute questions were answered. Written informed consent was obtained and saved in the electronic medical record, after the risks, benefits, and alternatives were discussed with the patient.      A formal time-out procedure was performed, as per protocol, including patient name, title of procedure, and site of procedure, and all in the room concurred.  Routine monitors were applied.      The patient was placed in the prone position on the procedure room table.  All pressure points were checked and comfortably padded.  Routine monitors were placed.  Vital signs were stable.    A chlorhexidine prep was completed followed by sterile draping per standard procedure.     AP fluoroscopic guidance was used to identify the right SI joint(s), with slight contralateral oblique tilt, until the joint was maximally visualized.   After 1% lidocaine infiltration using a 25 gauge 1.5 inch needle, a 3.5 inch spinal needle  was introduced and advanced through the anesthetized plane and advanced to the joint space.  Depth was confirmed with lateral fluoroscopic guidance. After negative aspiration for heme, we injected 0.5 ml of Isovue contrast into SI joint. Images obtained.      After negative aspiration for heme, 3 mL of a treatment mixture containing 1 mL of depomedrol (40 mg/ml) and 2 mL of bupivacaine 0.25% was injected into the SI joint. The needle was then removed.  The same procedure was performed on the left side.    Light pressure was held at the puncture site(s) to prevent ecchymosis and oozing.  The patient's skin was cleansed, and hemostasis was confirmed.  Band-aids were applied to the needle injection site(s).      Condition:    The patient remained awake and alert throughout the procedure.  The patient tolerated the procedure well and was monitored for approximately 15 minutes afterward in the post procedure area.  There were no immediate post procedure complications noted.  The patient was then discharged to home as per protocol.      Pre-procedure pain score: 3/10  Post-procedure pain score: 0/10

## 2023-10-24 NOTE — PROGRESS NOTES
Palliative Care Outpatient Clinic Progress Note    Patient Name: Shaneka Alvarez is being evaluated via a billable video visit.    Primary Provider:  [facility], [facility]  Primary Oncologist: Dr Zepeda  Last seen by palliative care: myself, 9/6/23      Chief Complaint: no benefit of SI joint injections    Background/Summary  Medical:  - breast cancer ER+/VA+, Her2 neg diagnosed in 2006. 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 Didn't tolerate Xeloda, paclitaxel Nov 2019-Jan 2020, treated with eribulin and zometa. PD seen on PET Nov 2020 as well as a new L 4th rib concerning for a pathologic fracture. Started Trodelvy 11/11/20, PD Dec 21, switched to casodex.               - PD again on PET/CT March 2022 including b/l iliac crest lesions, R clavicle, and multifocal T spine involvement. Switched to single agent Doxil April 2022, dose decrease to 80% at 2nd cycle in light of side effects. Tolerated that well. Complete response on scan Oct 2022, apparent slow progression based on rising . Ongoing observation on single-agent Doxil, next PET Nov 23  - b/l PE found on PET July 2020, on anticoagulation  - MS with mild right-sided weakness    - long-standing history of migraines, follows with neurology. On monthly aimovig with ubrelvy prn  - SI joint pain, likely largely degenerative, but patient also has metastatic disease in that area. Recently completed appointments with PT, still doing exercises at home, injection by pain clinic Oct 23 without relief. Some relief with cannabis products, NSAIDs not effective  - post-zoster neuralgia (ED Visit July 2022), completely resolved         Social  She lives with her    6 adult children, 1 lives  "locally  Middle-, currently not working. She misses it, but also realizes that many days she wouldn't be able to go to work.     She visited both her kids in [place] in the past month and is planning a trip to [place] next week     Psychospiritual  Has been through traumatic experiences in the past. Addressed these with counselors prior to her diagnosis. Doing quite well now.      She has a very close friend who is also living with incurable breast cancer. They have good conversation and are important support to each other.      Care Planning   She is aware of the presumed gradual progression of her cancer and that she likely will have to move on from her current treatment plan in the near future. This is \"worrisome\" to her and she also trusts that there will indeed be another option after this one.   She is able to live with this knowledge without getting too anxious or worried, sleeps mostly ok.      POLST completed 4/1/20: DNR/selective treatments     Opioid Safety   Expected prognosis: > 1 year  Risk: Low (ORT 0)  Indication for Opioid Use: Moderate or Strong  Naloxone Script provided if patient also on benzodiazepine: yes  Indications for Tapering reviewed: rarely uses morphine     : reviewed, ok    Interim History:  At the last visit we discussed lower back/SI joint pain (was unclear at the time whether PD or degenerative disease, increased belbuca, discussed role of morphine, referred to spine clinic), coping (no needs at the time)    Patient is on the call by herself  History gathered today from: patient, medical chart    She had injections to her SI joints without relief.     The increase in belbuca has been helpful, she has breakthrough pain about 3-4 evenings per week.     NSAIDs haven't been helpful in the past. She does have some relief with cannabis products.     She has now more limitations: difficulties walking longer distances (needs wheelchair at airport), needs help with higher " steps.      Functional Status:  Palliative Performance Scale: 70          Impression & Recommendations & Counseliny/o woman with metastatic breast cancer    Pain: no relief with injections. Still doing PT exercises at home. Gradual decline in functional status.   - continue belbuca 450mg q12h  - resume morphine 15-30mg q4h prn  - continue with current topical treatments  - register for medical cannabis (enrollment has lapsed)  - will consider PM&R input depending on treatment plan after upcoming scans    Return to clinic in 6-8 weeks    Data / Chart Review:    Review of Systems:   ROS: 10 point ROS neg other than the symptoms noted above in the HPI and pertinents here.         Physical Exam:   Physical Exam:  Vital Signs not checked, virtual visit    CONSTIT: awake, appears comfortable  EENT: MMM, EOMI, no icterus  RESP: reg, nl effort, no cough  SKIN: no rash, no obvious lesions  NEURO: alert, oriented x3  PSYCH: appropriate affect, memory and thought process intact    The rest of a comprehensive physical examination is deferred  due to public health emergency video visit restrictions.      Current pertinent medications:  Belbuca 450mg q12h  MSIR 15-30mg q3h prn (last script 2022)              Naloxone prescribed  acetaminophen 1000mg tid     Dexamethasone 4mg q12h     Venlafaxine 225mg daily  Lorazepam 0.5-1mg q6h prn   Trazodone 50mg HS  Buspirone 15mg bid     Olanzapine prn nausea      No pertinent allergies      Lab and imaging data reviewed:  Comments:           Video-Visit Details    Type of service:  Video Visit    Physician has received verbal consent for a Video Visit from the patient? Yes    Video Start Time: 3:17 PM    Video End Time (time video stopped): 9322    Originating Location (pt. Location): Home    Distant Location (provider location):  [facility]   Distant Location (provider location):  Off-site    Mode of Communication:  Video Conference via  [facility]    Alva Whitaker MD  Palliative Medicine

## 2023-10-25 NOTE — PATIENT INSTRUCTIONS
Patient Instructions      Expand All Collapse All    Thank you for attending the Palliative Care Clinic appointment today.     1. Pain:  - no change in belbuca  - you can take morphine 15-30mg (1 or 2 tablets) up to every 4 hours as needed  - I recertified you for medical cannabis. You should get an email about this soon   - we can ask Physical Medicine & Rehab for their input. Please let me know if you want a referral      Call if your symptoms change and the medications we have prescribed are not working.   Do not take your medications at any other dose or frequently than how they are prescribed. Call if you think we should make any changes.    Call us at least 3-5 workdays in advance if you need a medication refill.    Return to clinic in 6-8 weeks for a follow up.     You can reach the Palliative Care Team during business hours at the following number:    - [phone]  - or send me a [employer] message    To reach the Palliative Care Provider on-call After-hours or on holidays and weekends, call: [phone].  Please note that we are not able to provide pain medication refills on evenings or weekends.

## 2023-10-25 NOTE — NURSING NOTE
Is the patient currently in the state of [place]? YES    Visit mode:VIDEO    If the visit is dropped, the patient can be reconnected by: VIDEO VISIT: Text to cell phone:   Telephone Information:   Mobile [phone]       Will anyone else be joining the visit? NO  (If patient encounters technical issues they should call [phone]    How would you like to obtain your AVS? MyChart    Are changes needed to the allergy or medication list? Pt stated no med changes    Reason for visit: RECHECK    Emily IGLESIAS

## 2023-10-25 NOTE — LETTER
10/25/2023         RE: Shaneka Alvarez  [id number] [facility] [address] [facility] [address]        Dear Colleague,    Thank you for referring your patient, Shaneka Alvarez, to the [facility]. Please see a copy of my visit note below.    Palliative Care Outpatient Clinic Progress Note    Patient Name: Shaneka Alvarez is being evaluated via a billable video visit.    Primary Provider:  Clinic, [facility]  Primary Oncologist: Dr Zepeda  Last seen by palliative care: myself, 9/6/23      Chief Complaint: no benefit of SI joint injections    Background/Summary  Medical:  - breast cancer ER+/MS+, Her2 neg diagnosed in 2006. 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 Didn't tolerate Xeloda, paclitaxel Nov 2019-Jan 2020, treated with eribulin and zometa. PD seen on PET Nov 2020 as well as a new L 4th rib concerning for a pathologic fracture. Started Trodelvy 11/11/20, PD Dec 21, switched to casodex.               - PD again on PET/CT March 2022 including b/l iliac crest lesions, R clavicle, and multifocal T spine involvement. Switched to single agent Doxil April 2022, dose decrease to 80% at 2nd cycle in light of side effects. Tolerated that well. Complete response on scan Oct 2022, apparent slow progression based on rising . Ongoing observation on single-agent Doxil, next PET Nov 23  - b/l PE found on PET July 2020, on anticoagulation  - MS with mild right-sided weakness    - long-standing history of migraines, follows with neurology. On monthly aimovig with ubrelvy prn  - SI joint pain, likely largely degenerative, but patient also has metastatic disease in that area. Recently completed appointments with PT, still doing exercises  "at home, injection by pain clinic Oct 23 without relief. Some relief with cannabis products, NSAIDs not effective  - post-zoster neuralgia (ED Visit July 2022), completely resolved         Social  She lives with her    6 adult children, 1 lives locally  Middle-, currently not working. She misses it, but also realizes that many days she wouldn't be able to go to work.     She visited both her kids in [place] in the past month and is planning a trip to [place] next week     Psychospiritual  Has been through traumatic experiences in the past. Addressed these with counselors prior to her diagnosis. Doing quite well now.      She has a very close friend who is also living with incurable breast cancer. They have good conversation and are important support to each other.      Care Planning   She is aware of the presumed gradual progression of her cancer and that she likely will have to move on from her current treatment plan in the near future. This is \"worrisome\" to her and she also trusts that there will indeed be another option after this one.   She is able to live with this knowledge without getting too anxious or worried, sleeps mostly ok.      POLST completed 4/1/20: DNR/selective treatments     Opioid Safety   Expected prognosis: > 1 year  Risk: Low (ORT 0)  Indication for Opioid Use: Moderate or Strong  Naloxone Script provided if patient also on benzodiazepine: yes  Indications for Tapering reviewed: rarely uses morphine     : reviewed, ok    Interim History:  At the last visit we discussed lower back/SI joint pain (was unclear at the time whether PD or degenerative disease, increased belbuca, discussed role of morphine, referred to spine clinic), coping (no needs at the time)    Patient is on the call by herself  History gathered today from: patient, medical chart    She had injections to her SI joints without relief.     The increase in belbuca has been helpful, she has breakthrough pain " about 3-4 evenings per week.     NSAIDs haven't been helpful in the past. She does have some relief with cannabis products.     She has now more limitations: difficulties walking longer distances (needs wheelchair at airport), needs help with higher steps.      Functional Status:  Palliative Performance Scale: 70          Impression & Recommendations & Counseliny/o woman with metastatic breast cancer    Pain: no relief with injections. Still doing PT exercises at home. Gradual decline in functional status.   - continue belbuca 450mg q12h  - resume morphine 15-30mg q4h prn  - continue with current topical treatments  - register for medical cannabis (enrollment has lapsed)  - will consider PM&R input depending on treatment plan after upcoming scans    Return to clinic in 6-8 weeks    Data / Chart Review:    Review of Systems:   ROS: 10 point ROS neg other than the symptoms noted above in the HPI and pertinents here.         Physical Exam:   Physical Exam:  Vital Signs not checked, virtual visit    CONSTIT: awake, appears comfortable  EENT: MMM, EOMI, no icterus  RESP: reg, nl effort, no cough  SKIN: no rash, no obvious lesions  NEURO: alert, oriented x3  PSYCH: appropriate affect, memory and thought process intact    The rest of a comprehensive physical examination is deferred  due to public health emergency video visit restrictions.      Current pertinent medications:  Belbuca 450mg q12h  MSIR 15-30mg q3h prn (last script 2022)              Naloxone prescribed  acetaminophen 1000mg tid     Dexamethasone 4mg q12h     Venlafaxine 225mg daily  Lorazepam 0.5-1mg q6h prn   Trazodone 50mg HS  Buspirone 15mg bid     Olanzapine prn nausea      No pertinent allergies      Lab and imaging data reviewed:  Comments:           Video-Visit Details    Type of service:  Video Visit    Physician has received verbal consent for a Video Visit from the patient? Yes    Video Start Time: 3:17 PM    Video End Time (time video  stopped): 1538    Originating Location (pt. Location): Home    Distant Location (provider location):  [facility]   Distant Location (provider location):  Off-site    Mode of Communication:  Video Conference via [employer]    Alva Whitaker MD  Palliative Medicine      Again, thank you for allowing me to participate in the care of your patient.        Sincerely,        Alva Whitaker MD

## 2023-10-26 NOTE — TELEPHONE ENCOUNTER
Patient notified that her insurance company will not allow for an early fill for travel and the soonest she can  the Belbuca and morphine is on 11/1.  Patient is going to see how much medication she has left and call writer back if she would like the prescription sent to a pharmacy where she will be on vacation.    Will update Dr. Sherman Mckinney, RN  Palliative Care Nurse Clinician    [phone] (Direct)  [phone] (Main)  [phone] (Appointment Scheduling)

## 2023-11-15 NOTE — PATIENT INSTRUCTIONS
Chemo today if labs are ok    Cont xgeva monthly    See me 12/5/2023 and chemo on 12/13/2023    See me 1/9/2024 and chemo on 1/10/2024

## 2023-11-15 NOTE — PROGRESS NOTES
ONCOLOGY FOLLOW UP NOTE: 11/15/23        I took the history from reviewing the previous notes that she was following with Dr. Amaya.  I have copied and updated from prior notes.    ONCOLOGY History:    1.  January 2006:  Diagnosed with Stage IIB, T2 N1 M0 invasive lobular carcinoma of the right breast.  Final pathology showed a 4.5 x 3.8 x 2.5 cm, 01/14 lymph nodes positive.  Estrogen, progesterone receptor positive, HER-2 negative.     2.  Genetics.  BRCA1 and 2 mutations not detected. 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       THERAPY TO DATE:   1.  2006:  ECOG 2104 protocol of dose-dense Adriamycin, Cytoxan and Avastin x4 cycles followed by Taxol and Avastin x4 cycles.   2.  03/2007:  Completed 1 year Avastin.   3.  11/2006-01/2007:  Radiotherapy to the right breast of 5040 cGy.   4.  03/20070112-5558:  Aromasin.  Stopped after moving to the [facility].   5.  01/2016:  Right modified radical mastectomy with latissimus dorsi flap reconstruction and a left prophylactic mastectomy with latissimus dorsi flap reconstruction.     She recently had MRI of the brain as a follow-up for multiple sclerosis and there were multiple calvarial lesions noted which was suspicious for metastatic disease.  There was no evidence of new multiple sclerosis lesions.  She had a PET scan on 8/30/2019 which showed widespread bone metastatic lesions (calvarium, spine, sacrum, pelvis, ribs most prominent at C7, T3, L1 and L4), hypermetabolic 3 cm lesion in LLL, and mediastinal/hilar LN. CEA wnl at 1.9 and C27-29 elevated to 415 (28 on 8/23/16). A CT guided biopsy of the right iliac bone was obtained on 9/4/19, pathology consistent with metastatic adenocarcinoma (breast), ER/PA negative, HER-2 negative PDL 1 < 1%. A second biopsy was take of the LLL nodule via EBUS on 9/5/19 did not show any malignancy.    C/T/L spine MRI 8/3/19 to 9/2/19 with numerous enhancing lesions of the C, T and L spine, largest around T9 and L1. No evidence  of cord compression. Has a L1 compression fracture and impending L4.     Received radiation T12-L5 3000 cGy in 10 fractions from 9/6/2019 till 9/18/2019.  Neurosurgery recommended no surgical intervention but to wear Gorge brace when out of bed and HOB >30 degrees    She started palliative Xeloda 2000/1500 on 10/1/2019 but after just a few doses she noticed nausea, red eyes blurred vision, loss of appetite.  She stopped taking it after 5 doses and was seen by nurse practitioner on 10/7/2019 when she was improving.  At that point she was restarted on Xeloda at a lower dose of 1000 mg twice a day.  As she was not able to tolerate even the lower dose with extreme nausea and vomiting and feeling extremely fatigued and blurry vision, we decided on stopping Xeloda.    We decided on repeating scans and MRI brain on 10/25/2019 showed no intracranial metastatic disease.   Multiple enhancing calvarial lesions may be increased in number and are suggestive of metastatic disease. Thinner imaging on the current study may identify the smaller lesions and may be responsible for  identified more lesions.   There is a single focus of T2 hyperintense signal within the anterior right temporal lobe may represent interval demyelination. There is no evidence for active demyelination.    PET/CT on 11/1/2019 showed favorable response to therapy and Overall FDG uptake within scattered osseous metastases is decreased since 8/30/2019, particularly within the pelvis. Increased sclerotic appearance of L1 and L4 pathologic compression deformities, likely sequela of recently completed palliative radiation therapy.    No significant FDG uptake in previously demonstrated hypermetabolic mediastinal lymph nodes. Biopsy negative on 9/5/2019.  There is also decreased FDG uptake in the left lower lobe (biopsy negative for  malignancy), now with dense consolidation containing air bronchograms suggestive of infection versus aspiration.  There is diffuse  FDG uptake within the esophagus, consistent with esophagitis.    Because of intolerance to Xeloda we decided on switching to weekly paclitaxel on 11/5/2019.    C#2 Taxol 12/4/19- started with 70mg/m2 dose reduction    C#3 Taxol 12/30/2019     PET/CT on 1/24/2020 showed progression of the disease in some of the bone lesions including increase in size and lytic lesion within the vertebral body of C4 measuring 1 cm as opposed to 0.6 cm previously and a new central soft tissue nodule with SUV max 4.1 when previously it was non-hypermetabolic.  There is also some progression noted in the right proximal femur and right iliac bone.  There is decreased metabolic uptake in the right lamina of T9 with SUV max 4.7 when previously it was 8.0.  Other lesions are stable.    CA 27-29 also had increased significantly and it was 257 on 1/28/2020.    We decided on switching to eribulin on 1/28/2020.    C#2 2/18/2020  C#2 D#8 2/25/2020    C#3 D#1 3/18/2020 -delayed by 1 week as per patient's preference because she wanted to visit her family.    She had a repeat PET/CT on 3/27/2020 which showed stable size of the bone metastatic lesions although the FDG avidity was less, consistent with treatment response.    She had MRI of the thoracic spine on 4/3/2020 and it was compared to the one which was done in August 2019 and it showed increased size of several metastatic lesions most notably at T9 but also at T5-T7.  Other lesions at T10, T11 and T12 appeared improved.    CA 27-29 was also down to 222 on 3/18/2020.    Cycle #4 eribulin ( dose reduced to 1.2 mg/m2 )-4/7/2020-   Cycle #5 Eribulin ( 1.2 mg/m2 )- 4/28/2020   Cycle #6 Eribulin ( 1.2 mg/m2 )- 5/19/2020     Cycle #7 Eribulin ( 1.2 mg/m2 )- 6/9/2020    Cycle #8 Eribulin ( 1.2 mg/m2 )- 6/30/2020     She had a repeat PET scan on 7/17/2020 which showed incidental finding of new acute bilateral pulmonary embolism in the distal left main pulmonary artery extending in the left upper and  lower lobes and in the segmental and branch arteries in the right lower lobe.    It also showed mildly increased FDG avidity in the lytic lesion in the T9 lamina and right pedicle with SUV max 5.3, previously 3.9.  That is also slightly increased FDG uptake to the left anterior fifth rib at the costochondral junction SUV max 3.9, previously 2.5.  There is slightly decreased FDG uptake in the right femoral neck lesion SUV max 2.2, previously 2.9.  FDG uptake in other bone lesions is fairly stable.  No new metastasis are seen.    CA 27-29 was 308 on 6/30/2020.  It was 286 on 5/19/2020.    Overall this is consistent with only slight progression versus stable disease.    She was started on Lovenox.    Cycle #9 eribulin 7/21/2020. CA 27-29 was 238    C#10 eribulin 8/18/2020. ( delayed by one week for ANC 0.9 )    C#11 Eribulin 9/8/2020    C#12 Eribulin 9/29/2020     C#13 Eribulin 10/20/2020     PET scan on 11/6/2020 shows progression of the disease with increased FDG uptake in the lytic lesions in the T9/T10 and right posterolateral elements as well as increased FDG uptake in the previously known hypermetabolic right iliac bone lesion suggesting recurrence of previously treated metastasis.  There is new subtle lesion in the right manubrium.  There is also a new fracture in the anterolateral left fourth rib with associated uptake and this could be a pathologic fracture.  Healing fracture in the left anterior fifth rib lesion.  There is resolution of the left pulmonary emboli.  Decreased FDG uptake of the esophagus consistent with improving inflammation.    CA 27-29 on 10/20/2020 was also elevated at 489.    C#1 Trodelvy on 11/11/2020.  Cycle #2 Trodelvy 12/2/2020  Cycle number 2-day #8 Trodelvy 12/8/2020.    12/15/2020-12/16/2020.  She was admitted to the hospital with nausea vomiting and dehydration.  She had tachycardia and initial lactic acid of 5.  It decreased to 1.4 after 2 L of normal saline.  She also had  hypokalemia and ANC was 1.3.  She was treated with IV fluids.  Procalcitonin was negative.  CTA of the chest was negative for pulmonary embolism or pneumonia.  No bacterial infection was documented.  Her medication which she was initially unable to tolerate at home, were restarted and she was discharged home once started to feel better.      We delayed the start of cycle number 3 x 1 week and decrease the dose of chemotherapy by 25%.  She also had neutropenia with ANC of 1.0.      She started cycle number 3-day #1 on 12/29/2020.  CA 27-29 was 392.    Cycle number 3-day #8 1/5/2021.    C#4 Trodelvy 1/20/2021- 75% dose    2/5/2021.  PET/CT overall shows a good response to treatment with improvement of previously hypermetabolic lesions in the spine and pelvis and stability of other bone meta stasis.  There is a single lytic lesion in the right iliac bone which demonstrates slight Yimi increased FDG uptake and has SUV max 4.7 while previously it was SUV max 3.4.  There was diffuse wall thickening of the esophagus with uptake in the esophagus in the fundus of the stomach and this could be seen with inflammation.    Trodelvy cycle #5 - 2/10/2021.  75% of the dose.  CA 27-29 was 337.    Trodelvy cycle #6 - 3/3/2021.  75% of the dose.  Trodelvy cycle #6, D#8 - 3/10/2021.  75% of the dose.    Trodelvy C#8, D#8 on 4/21/2021  CA 27-29 stable at 371 on 4/14/2021    Trodelvy, cycle #9- 5/5/2021        On 2/25/2020 when she had biopsy of the right acetabulum and it was consistent with metastatic lobular carcinoma.  Again it was Triple Negative.     On 3/18/2020 when she also met with Dr. Arelis Arshad who also advised testing for androgen receptor studies on the biopsy specimen.  Tumor was diffusely androgen receptor positive.      5/26/2021-cycle #10 Trodelvy     CA 27-29 was 545.    6/11/2021.  PET/CT shows mildly increased uptake in several bone lesions for example in the left anterior iliac crest, mid right iliac crest and left  ischium.  Uptake in other bone lesions are similar to previously.    Because of minimal progression of the disease and she is tolerating chemotherapy very well, we decided on continuing the same chemotherapy.    6/16/2021-Trodelvy cycle #11    7/7/2021 -Trodelvy cycle #12    9/14/2021-Trodelvy-cycle #15, day #8.    9/8/2021-CA 27-29 was more elevated and it was 1176.    PET/CT on 9/24/2021 showed stable findings with no evidence of FDG avid metastatic disease within the body.  Left axillary lymph nodes are prominent and hypermetabolic and likely from recent vaccinations in the left deltoid muscle.  Healed bony metastasis seems stable.      Cycle #16, Trodelvy 9/28/2021.  Cycle #17, day #8 Trodelvy was on 10/26/2021.  Cycle #18, day #8 Trodelvy on 11/22/2021       She saw Dr. Mejia whom on 10/6/2021.  She was not considered eligible for Enfortumab trial.    Cycle #19, Trodelvy on 12/7/2021 12/21/2021.  PET/CT showed progression of bony metastatic disease.  There is increase hypermetabolism in both the right and left iliac bones and the sternum.  Other bone lesions are stable.    There is new scattered areas of groundglass throughout both the lungs and these findings are indeterminate but may represent an infectious etiology.  Interval resolution of left axillary FDG avid lymphadenopathy.    She was started on Casodex 150 mg daily on 1/6/2022.    She was admitted to the hospital from 3/17/2022-3/19/2022 for vomiting and diarrhea and  severe back pain.  I reviewed the discharge summary.  CT lumbar spine showed a stable severe chronic L1 pathologic compression fracture.  Stable mild compression deformity of superior endplate of L3 and superior endplate of L4.  The upper margin of the L4 fracture extends slightly into the spinal canal without high-grade canal stenosis and this is also stable.  Nondisplaced fractures coursing through the L3 pedicles bilaterally were seen which were not clearly seen previously this  could be acute and likely pathologic.  No extraspinal soft tissue abnormality.  Neurosurgery recommended conservative management.  Her pain was controlled and she was discharged home as she felt better with this.      3/31/2022-PET/CT shows progressive bone meta stasis with progressive FDG uptake in the following lesions. Left iliac crest lesion with max SUV of 7.2, previously 3.5. Right mid iliac crest lytic lesion shows maximum SUV of 7.8, previously 6.9.  T10 vertebral body mixed lytic and sclerotic lesion with an SUV of 7.1, previously not hypermetabolic. Thoracic vertebral bodies 8, 7, 5, and 4 also show hypermetabolic lesions were previously there was no hypermetabolism.  Some of the sclerotic osseous lesions are unchanged and do not show increased hypermetabolism compatible treated lesion such as a severe compression deformity at L1 as well as superior compression deformity at L4.  Medial right clavicle sclerotic lesion with SUV max of 4.8, previously not hypermetabolic. There is also right-sided sternal lesions.      4/14/2022--C#1- switch to single agent Doxil 50 mg per metered squared every 4 weeks.    5/13/2022-cycle #2- Doxil- dose reduced to 40 mg per metered square due to severe nausea/vomiting and migraines requiring IV fluids and IV antiemetics.    6/10/2022-cycle #3-Doxil 40 mg per metered square    7/1/2022- PET/CT shows resolution of the hypermetabolic skeletal metastasis.    7/6/2022-cycle #4-Doxil 40 mg per metered square  8/5/2022- cycle #5-Doxil  9/1/2022.  Cycle #6-Doxil  9/30/2022- cycle #7-Doxil    10/18/2022.  PET/CT does not show evidence of any FDG activity    11/2/2022-cycle #8-Doxil.  11/30/2022-cycle #9-Doxil.  12/28/2022-cycle #10-Doxil    2/3/2023- C#11- Doxil (delayed by 1 week because neutrophil count was 0.9)-wanted to give her Onpro but at that time it was not approved by insurance.    3/3/2023.  Cycle #12 Doxil.  Given with Onpro.    Repeat PET/CT on 3/27/2023 overall showed  very stable findings.  PET/CT showed focal FDG avidity in the heart. This is in an expected location of AV node or mitral valve.  This is nonspecific.  We checked an echocardiogram on 4/20/2023 which was unremarkable.  She is asymptomatic.  Continue to monitor clinically.      3/31/2023.  Cycle #13 Doxil.  Given with Onpro.     4/12/2023-she presented to ED with increased migraine headaches and nausea and vomiting.  She was treated symptomatically and was discharged home after she started to feel better.      4/28/2023.  Cycle #14 Doxil.  Given with Onpro    Cycle #16 Doxil with Onpro 6/23/2023    Cycle #17 Doxil with Onpro 7/21/2023      CA 15-3 has been slowly rising.    Repeat PET/CT on 8/8/2023 showed pretty stable findings.    She has been having more pain in the lumbar spine/low back.    8/18/2023.  Cycle #18 Doxil with Onpro    9/15/2023.  Cycle #19 Doxil with Onpro.    10/16/2023.  Cycle #20 Doxil with Onpro.    11/10/2023.  PET/CT overall showed pretty stable findings.    11/15/2023.  Cycle #21 Doxil with Onpro is planned.      Interval history.  She tells me that she did not get much improvement after getting SI joint steroid injections on 10/13/2023.  But with the increased dose of buprenorphine buccal film, her pain is decently controlled.  She very occasionally has to take as needed morphine.  She mainly has pain only in the bilateral SI joint area.  No new pain.  She is tolerating chemotherapy well without nausea vomiting diarrhea constipation.  Migraines are much better controlled.  She is on Eliquis.  No bleeding.  No shortness of breath.  Neuropathy is mild and stable.      ECOG 1    ROS:  Rest of the comprehensive review of the system was negative.        I reviewed other history in epic as below.       PAST MEDICAL HISTORY:     1.  Breast cancer  2.  Multiple sclerosis.   3.  Depression.   4.  Migraines.   5.  Hypertension.   6.  Cholecystectomy and umbilical hernia repair.     SOCIAL HISTORY: She  "smoked for 5 years but quit many years ago.  Drinks alcohol socially.  She lives with her .  She is a teacher in middle school.       FAMILY HISTORY: She mentions that her mother had breast cancer at 49.  Both grandmothers had breast cancer but she is not sure of the age.  A couple of cousins have cancers.  Patient has 3 children who are healthy.    Current Outpatient Medications   Medication    apixaban ANTICOAGULANT (ELIQUIS ANTICOAGULANT) 5 MG tablet    Buprenorphine HCl (BELBUCA) 450 MCG FILM buccal film    busPIRone (BUSPAR) 15 MG tablet    calcium citrate-vitamin D (CITRACAL) 315-250 MG-UNIT TABS    Cholecalciferol (VITAMIN D3 PO)    galcanezumab-gnlm (EMGALITY) 120 MG/ML injection    LORazepam (ATIVAN) 0.5 MG tablet    metoclopramide (REGLAN) 5 MG tablet    morphine (MSIR) 15 MG IR tablet    OLANZapine (ZYPREXA) 5 MG tablet    ondansetron (ZOFRAN ODT) 4 MG ODT tab    propranolol ER (INDERAL LA) 80 MG 24 hr capsule    propranolol ER (INDERAL LA) 80 MG 24 hr capsule    traZODone (DESYREL) 50 MG tablet    ubrogepant (UBRELVY) 100 MG tablet    venlafaxine (EFFEXOR XR) 75 MG 24 hr capsule    vitamin B-2 (RIBOFLAVIN) 25 MG TABS tablet    ZOLMitriptan (ZOMIG) 5 MG nasal spray     No current facility-administered medications for this visit.     Facility-Administered Medications Ordered in Other Visits   Medication    heparin 100 unit/mL injection 5 mL    sodium chloride (PF) 0.9% PF flush 10 mL        Allergies   Allergen Reactions    Bactrim [Sulfamethoxazole W/Trimethoprim]      Internal bleeding    Copaxone [Glatiramer] Hives          PHYSICAL EXAMINATION:     BP (!) 153/94 (BP Location: Left arm, Patient Position: Chair, Cuff Size: Adult Regular)   Pulse 60   Temp 97.9  F (36.6  C) (Oral)   Ht 1.575 m (5' 2\")   Wt 57.2 kg (126 lb)   SpO2 100%   BMI 23.05 kg/m    Wt Readings from Last 4 Encounters:   11/15/23 57.2 kg (126 lb)   10/25/23 56.7 kg (125 lb)   10/16/23 58.4 kg (128 lb 12.8 oz)   10/13/23 " 56.7 kg (125 lb)       CONSTITUTIONAL: no acute distress  EYES: PERRLA, no palor or icterus.   ENT/MOUTH: no mouth lesions. Ears normal  CVS: s1s2 no m r g .   RESPIRATORY: clear to auscultation b/l  GI: soft non tender no hepatosplenomegaly  NEURO: AAOX3  Grossly non focal neuro exam  INTEGUMENT: no obvious rashes.  No subcutaneous nodule at the back of the scalp is a stable.  LYMPHATIC: no palpable cervical, supraclavicular, axillary or inguinal LAD  MUSCULOSKELETAL: Unremarkable. No bony tenderness.   EXTREMITIES: no edema  PSYCH: Mentation, mood and affect are normal. Decision making capacity is intact          Labs and Imaging.    Reviewed.    11/15/2023   CBC shows WBC 4.1.  Hemoglobin 11.1.  Platelets 161.  CMP shows calcium 8.7.  Albumin 3.4.    CA 15-3 was 237 on 10/16/2023    CA 15-3 was 242 on 9/15/2023    CA 15-3 was 193 on 8/18/2023 7/21/2023.    CA 15-3 was 186.    7/3/2023    CA 15-3 was 177 on 6/23/2023      CA 15-3 was 129 on 3/31/2023      CA 15-3 was 123 on 3/3/2023.      11/30/2022     CA 15-3 was 135.    9/30/2022  CA 15-3 was 159.  CA 27-29 was 1992 on 6/10/2022  CA 27-29 was 3370 on 5/13/2022.  It was 5307 on 4/14/2022 ferritin Doxil was started.      9/28/2021.      Iron studies, ferritin is 101, iron 51, TIBC 268 and iron saturation index 19%.        PET/CT on 11/10/2023  COMPARISON: PET 8/8/2023        FINDINGS:      HEAD/NECK:  There is no suspicious FDG uptake in the neck.     The paranasal sinuses are clear. The mastoid air cells are clear.      No hypermetabolic lymph nodes are demonstrated in the neck.      The mucosal and deep spaces of the neck are unremarkable. The major  salivary glands are unremarkable. The thyroid is unremarkable. The  major vasculature of the neck are patent.     CHEST:  Bilateral mastectomies with breast implants.  There is no suspicious FDG uptake in the chest.     The central tracheobronchial tree is clear. No pleural effusion or  pneumothorax. No  acute consolidation.      No hypermetabolic lymph nodes are demonstrated in the chest.     Heart size is within normal limits. No pericardial effusion. The  thoracic aorta and main pulmonary artery are within normal limits for  diameter. The esophagus is unremarkable.      ABDOMEN AND PELVIS:  There is no suspicious FDG uptake in the abdomen or pelvis.     The liver is unremarkable. Cholecystectomy clips. No intrahepatic or  extrahepatic biliary ductal dilatation. The pancreas is unremarkable.  No pancreatic ductal dilatation. The spleen is unremarkable. The  adrenal glands are unremarkable.     Symmetric enhancement of the kidneys. No hydronephrosis. The urinary  bladder is unremarkable. Uterus is unremarkable. Ovaries are absent.  No hypermetabolic lymph nodes are demonstrated in the abdomen or  pelvis.     Normal caliber of the small and large bowel. No free air, free fluid,  or fluid collection. Normal caliber of the abdominal aorta.     LOWER EXTREMITIES:   There is no suspicious FDG uptake in the visualized lower extremities.     BONES: Again demonstrated are numerous sclerotic and lytic lesions  some of which may remains mildly hypermetabolic. FDG uptake in bone  metastases are not substantially changed, with a few lesions showing  mild FDG increases, others decreased, and others not significantly  changed. Examples include:     Decreased FDG:    T8 vertebral body max SUV 3.1 (prior 4.3).  T9 left posterior lytic lesion max SUV 3.1 (prior 3.4)     Increased FDG:   A left ninth rib costovertebral chondral junction max SUV 3 (prior  2.6.)  Right sixth rib anteriorly max SUV 3.8 prior 2.7.     Not significantly changed:  Right second rib anterolateral focus of hypermetabolism max SUV 3.9,  not significantly changed from prior.   Right iliac bone max SUV 3.6 (prior 3.9).     Multiple old right rib fractures. Left 10th rib the deformity with  callus. A severe compression deformity of L1 unchanged.                                                                       IMPRESSION: In this patient with history of breast cancer:  Overall  stable disease.  Again demonstrated are numerous metastatic bone lesions, some of which  have mildly elevated FDG activity. Some have increased slightly,  others have decreased slightly,  others are not significantly changed.      CT lumbar spine on 8/28/2023  IMPRESSION:  1.  No evidence for acute displaced fracture. Fractures of the L1, L3 and L4 vertebral bodies appear stable from 07/31/2023.  2.  No evidence of traumatic subluxation.  3.  Diffuse osseous metastatic disease.    MRI of the lumbar spine on 7/31/2023  COMPARISON: PET/CT 03/27/2023.  TECHNIQUE: MRI Lumbar Spine without IV contrast.     FINDINGS: Examination is mildly limited by patient motion artifact.     Alignment is unchanged. Redemonstrated heterogeneous bone marrow signal throughout the visualized lumbosacral spine including numerous focal abnormal T1 hypointense and STIR hyperintense marrow replacing lesions, including confluent lesions at T12 and S2   vertebrae. Similar chronic L1 pathologic burst fracture with mild superior endplate retropulsion and severe anterior-central vertebral body height loss. Likewise, similar chronic L4 superior endplate fracture with associated mild retropulsion. Large   Schmorl's node at the L2 inferior endplate, as before. Vertical defects in the bilateral L3 pedicles are redemonstrated compatible with chronic pathologic fractures. Asymmetric STIR hyperintense signal abnormality in the left sacral ala partially   visualized, which may represent additional confluent pathologic marrow replacement. Otherwise, remaining lumbar vertebral body heights are maintained. Conus signal is normal and terminates at L2. No perivertebral soft tissue edema. There is mild edema   within the bilateral multifidus and erector spinae muscles, which may indicate posttraumatic injury/strain. Superficial subcutaneous  soft tissue edema may be posttraumatic or related to positioning. Prominence of bile duct may be related to   postcholecystectomy physiologic ectasia. Small/subcentimeter right renal cyst. Last fully formed disc is designated L5-S1. Multilevel disc degeneration and height loss, severe at L5-S1.     T12-L1: No disc without bulge or protrusion. Facet joints are normal. Mild L1 superior endplate retropulsion indents the ventral thecal sac without spinal canal stenosis. No foraminal stenosis.     L1-L2: No disc without bulge or protrusion. Facet joints are normal. No spinal canal or foraminal stenosis.     L2-L3: Bilobed left asymmetric diffuse disc bulge. Mild facet arthropathy with ligamentum flavum thickening. Mild left foraminal stenosis. No right foraminal or spinal canal stenosis.     L3-L4: Bilobed left asymmetric diffuse disc bulge. Mild facet arthropathy with ligamentum flavum thickening. In conjunction with superior endplate retropulsion, there is borderline mild central spinal canal stenosis and mild left subarticular recess   narrowing. Mild right and moderate left foraminal stenosis.     L4-L5: Small left foraminal disc protrusion. Moderate facet arthropathy. No spinal canal or foraminal stenosis.     L5-S1: No disc without bulge or protrusion. Facet joints are normal. No spinal canal or foraminal stenosis.                                                                      IMPRESSION:  1.  Extensive osseous metastases as well as chronic pathologic fractures at L1, L3, and L4, as detailed.  2.  Partially visualized asymmetric signal abnormality in the left sacral ala may represent confluent pathologic marrow replacement. Marrow edema related to sacral fracture is difficult to exclude in the appropriate clinical context.  3.  Posterior paraspinous muscular edema can be compatible with posttraumatic injury/strain.  4.  Multilevel degenerative changes, as detailed.        7/6/2023.  MRI brain  IMPRESSION:  1.   No acute ischemia, mass/mass effect, or abnormal intracranial enhancement.   2.  Similar sequela from demyelinating disease.  3.  Osseous metastases, as before.      Echocardiogram 4/20/2023  Left ventricular size, wall motion and function are normal. The ejection  fraction is 60-65%.  Global right ventricular function is normal.  No hemodynamically significant valve abnormalities.  The inferior vena cava is normal.  No pericardial effusion.     This study was compared with the study from 4/7/22. No significant change.      3/27/2023.  PET/CT  1. No new suspicious FDG uptake within the skeleton.  2. Stable numerous non-FDG avid lytic and sclerotic osseous lesions  throughout the axial skeleton.  3. Stable nondisplaced fracture of the left acromion/glenoid, chronic  bilateral healing rib fractures, and compression deformities of the  thoracic and lumbar spine.  4. Hypermetabolic focus in the expected location of the AV node versus  mitral valve, this may represent a normal AV node versus mitral valve  pathology such as vegetations. Recommend follow-up with cardiac  Ultrasound.        11/30/2022-x-ray of the ribs of the left side  There are a few sclerotic lesion seen in the right anterior ribs that were seen on previous PET CTs. These appear to involve the fourth and fifth ribs. There are likely more subtle lesions that were   described on the PET CT scan. Irregularity of the distal end of the left ninth rib may be from metastatic disease or fracture on the oblique view which likely is chronic.       10/18/2022-PET/CT showed no new suspicious FDG uptake within the skeleton.  Stable abnormal FDG avid lytic and sclerotic osseous lesions.  Mild diffuse FDG uptake throughout the large bowel without CT abnormality.    7/1/2022.  PET/CT shows diffuse sclerotic and lytic osseous lesions without any abnormal FDG uptake in the skeleton, consistent with treated disease.  There is evaluation of previous hypermetabolic bone  lesions.  Multiple chronic rib fracture deformities and stable compression fracture deformities of T6, L1 and L4.  No suspicious FDG uptake in the chest abdomen or pelvis.    3/31/2022-PET/CT shows progressive bone metastasis with progressive FDG uptake in the following lesions. Left iliac crest lesion with max SUV of 7.2, previously 3.5. Right mid iliac crest lytic lesion shows maximum SUV of 7.8, previously 6.9.  T10 vertebral body mixed lytic and sclerotic lesion with an SUV of 7.1, previously not hypermetabolic. Thoracic vertebral bodies 8, 7, 5, and 4 also show hypermetabolic lesions were previously there was no hypermetabolism.  Some of the sclerotic osseous lesions are unchanged and do not show increased hypermetabolism compatible treated lesion such as a severe compression deformity at L1 as well as superior compression deformity at L4.  Medial right clavicle sclerotic lesion with SUV max of 4.8, previously not hypermetabolic. There is also right-sided sternal lesions.      Biopsy from the right acetabulum shows metastatic lobular carcinoma.  It is triple negative.  Androgen receptor was diffusely positive (90%, moderate intensity) by immunohistochemistry. )  PD-L1 negative.    Foundation one showed CDH1 mutation (initially lobular cancer), CCND1 amplification ( equivocal ). FGF3, FGF4, FGF19 amplification ( Equivocal ). Most promising is CCND1 amplification with abemaciclib showing response in 4/10 patients. FGF3,FGF4 and FGF19 are targetable with pan-FGFR inhibitors.           ASSESSMENT AND PLAN:     Metastatic breast cancer negative breast cancer (androgen receptor positive ) with extensive involvement of the bones.  There is also a left lower lobe hypermetabolic lesion and mediastinal lymph nodes which are hypermetabolic.  The biopsy from the right iliac bone is consistent with metastatic lobular breast cancer but it is hormone receptor and HER-2 negative and PDL 1 is also negative.    Foundation 1  testing did not reveal any actionable mutations.    She was intolerant to Xeloda and progressed on Taxol and eribulin.      We then switched to recently FDA approved sacituzumab govitecan-hziy (Trodelvy) for TNBC, based on the results of the phase II IMMU-132-01 clinical trial.   She started this on 11/11/2020.      She obtained a second opinion from Dr. Castillo from [facility] who recommended a repeat biopsy to be done to make sure that she really has triple negative breast cancer.      She also met with Dr. Arelis Arshad on 3/18/2021.      After 10 cycles of Trodelvy, she had PET/CT on 6/11/2021 which showed mildly increased uptake in some of the bone lesions while stability in other bone lesions.        After 15 cycles, repeat PET scan showed stable findings.  CA 27-29 has been increasing and it is 1176.      As she was tolerating the chemotherapy well and her quality of life has been decent and scans were stable although she had a rising CA 27-29, we decided on continuing the same chemotherapy because it has been a very slow progression of the disease.    Then she had clear progression of the disease in the bones with increased FDG uptake in both right and left iliac bones and the sternum.    Foundation one showed CDH1 mutation (initially lobular cancer), CCND1 amplification ( equivocal ). FGF3, FGF4, FGF19 amplification ( Equivocal ). Most promising is CCND1 amplification with abemaciclib showing response in 4/10 patients. FGF3,FGF4 and FGF19 are targetable with pan-FGFR inhibitor    As the tumor is diffusely positive for androgen receptor, we decided to start her on androgen receptor blockers.    I initially recommended enzalutamide 160 mg daily ( Enzalutamide for the Treatment of Androgen Receptor-Expressing Triple-Negative Breast Cancer----J Clin Oncol. 2018 Mar 20; 36(9): 884-890. ).    Eventually we decided to start her on Casodex 150 mg daily instead of Enzalutamide due to cost issues.  Clinical  Trial Clin Cancer Res. 2013 Oct 1;19(19):5505-12.   Phase II trial of bicalutamide in patients with androgen receptor-positive, estrogen receptor-negative metastatic Breast Cancer  She started Casodex on 1/6/2022.    She had progressive disease in the bones on the PET scan on 3/31/2022.    We changed to single agent Doxil on 4/14/2022.      She developed significant nausea vomiting and headache so cycle #2 was given on 5/13/2022 with 20% dose reduction which she tolerated well.    Cycle #3 was given on 6/10/2022 with the same dose reduced Doxil.    Repeat PET/CT on 7/1/2022 shows resolution of the FDG avid bony metastasis consistent with treated disease.  No suspicious FDG uptake was seen in the chest abdomen and pelvis.  CA 27-29 was also coming down.     Repeat PET/CT in October 2022 after completing 7 cycles continued to show normal FDG uptake.  CA 15-3 was trending down     Cycle #11 was delayed by 1 week because of chemotherapy-induced neutropenia.  It was received on 2/3/2023.      She received cycle #12 on 3/3/2023 with Onpro.    Repeat PET/CT on 3/27/2023 overall showed very stable findings.    Cycle 13 was given on 3/31/2023 with Onpro support.      Cycle #14 was on 4/28/2023.   CA 15-3 was stable at 115.     CA 15-3 has been slowly rising.    Repeat PET/CT on 8/8/2023 showed pretty stable findings.    She has been having more pain in the lumbar spine/low back.    Otherwise tolerating chemotherapy well.  As the progression has been very slow and no mild and overall she has been tolerating chemotherapy well, we decided to continue the same chemotherapy.    Over time CA 15-3 has continued to slowly increase but the PET scan on 11/10/2023 overall showed very stable findings.    As she is tolerating the chemotherapy well, I believe it is reasonable to continue with the same chemotherapy and not change the treatment because in the big picture cancers remains in decent control and treatments are being tolerated  very well.    In the future we can potentially consider another chemotherapy like carboplatin or gemcitabine.    Chemotherapy-induced neutropenia.  We will continue to give her Onpro.      Anemia.  She has mild anemia from chemotherapy.  Continue to observe.      Bone disease.  She has metastatic disease to the bone.  Previously she got palliative radiation to T12-L5 3000 cGy in 10 fractions from 9/6/2019 till 9/18/2019.  She was evaluated by orthopedics Dr. Bipin Elliott on 11/1/2019 and conservative management was recommended.    She was on Zometa every 3 months. She last received on 9/29/2020.  Because of progression of the disease in the bones, we switched to Xgeva which she received on 11/11/2020.    She had progression of the disease in the bones so we switched to Doxil but we continued xgeva.  PET scan on 7/1/2022 does not show any suspicious FDG uptake in the bones consistent with treated disease.  Repeat PET/CT in March 2023 was without any active FDG avid lesions.  Overall PET scan from 11/10/2023 remains pretty stable with slight decrease in FDG avidity of some lesions was a slight increase in some but overall showing pretty stable findings.  Continue Xgeva calcium and vitamin D.      Cancer related pain.  Following with palliative care.  Recently buprenorphine buccal film dose was increased which has helped.  She was also seen by Dr. Yusuf from pain clinic and she got bilateral SI joint steroid injections on 10/13/2023 which did not help much.  She occasionally takes as needed morphine.  Following with Dr. Whitaker and I reviewed the notes.  She is being enrolled in medical cannabis program.      Migraines.  Doing much better with Emgality.  She takes as needed Ubrelvy which helps.  She is following with neurology.      Bilateral pulmonary emboli.  Doing well with Eliquis.        We did not address the following today.      Shingles.  The rash has dried out.  She completed well with acyclovir and  prednisone.  She takes gabapentin for postherpetic neuralgia and is doing better.      Neuropathy.  This remains mild and stable with neuropathy in her hands.    This is in addition to the chronic balance issues and heat and cold sensitivity from underlying multiple sclerosis which is also stable for years.  Continue to monitor.     Subcutaneous nodule in the scalp.  This looks like an epidermoid cyst.  She thinks it is a stable.  Continue to monitor.       Insomnia.  Continue trazodone.      Wall thickening of esophagus and FDG uptake of fundus of the stomach.  It could be in the setting of inflammation from recent nausea and vomiting.  Symptoms have resolved.  Continue to monitor clinically.    Vision changes.    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  It was thought that this could be due to Taxol as patient reported to the ophthalmologist that she was on Taxol in September 2019 when her symptoms started.  But she was not on Taxol in September 2019 when she started noticing the visual changes so Taxol is not responsible for this.  The first dose of Taxol was on 11/5/2019.   She had left cataract extraction on 2/8/2021 and she has noticed significant improvement in her vision.  Thinks that her vision is back to her usual.      Increased FDG ability in the colon.  She had FDG uptake in the cecum and ascending colon but no corresponding lesion was seen on the CT scan.  She is completely asymptomatic so at this time we will continue to observe.    Discussion regarding healthcare directives.  On previous visit she told me that she will bring the health care directive for our records but she forgot so I told her to bring it on next visit.      Breast implant removal.  She tells me that she was planning to do breast implant removal because there was a recall on this.  Her surgery was scheduled for 9/24/2019.  Because of this new development of metastatic breast cancer and her  recent radiation, surgery has been canceled.  We discussed that at this time treatment for metastatic breast cancer would take precedence over the other surgery as the other surgery would require her to be off chemotherapy for several weeks and in that case the chances of cancer progression would be high and I would not recommend that.    Multiple sclerosis.  She will continue to follow with her neurologist Dr. Quiles.  Currently multiple sclerosis is under control and currently she is not taking any medications.    I will see her back on 12/5/2023.  She will get chemotherapy on 12/13/2023.    All of her questions were answered to her satisfaction.  She is agreeable and comfortable with the plan.    Dewayne Zepeda MD

## 2023-11-15 NOTE — PROGRESS NOTES
Infusion Nursing Note:  Shaneka Alvarez presents today for C21D1 Doxil + IVF + OnPro + Xgeva.    Patient seen by provider today: Yes: Dr. Zepeda   present during visit today: Not Applicable.    Note: Patient expressed no new medical concerns. Tolerating chemo well with minimal side effects.    Verified patient is taking Vitamin D and Calcium.    Intravenous Access:  Implanted Port.    Treatment Conditions:  Lab Results   Component Value Date    HGB 11.1 (L) 11/15/2023    WBC 4.1 11/15/2023    ANEU 22.4 (H) 07/27/2023    ANEUTAUTO 2.7 11/15/2023     11/15/2023        Lab Results   Component Value Date     11/15/2023    POTASSIUM 3.8 11/15/2023    MAG 2.0 03/12/2023    CR 0.77 11/15/2023    RAFAELA 8.7 (L) 11/15/2023    BILITOTAL 0.2 11/15/2023    ALBUMIN 3.4 (L) 11/15/2023    ALT 12 11/15/2023    AST 28 11/15/2023       Results reviewed, labs MET treatment parameters, ok to proceed with treatment.    ADDENDUM (2/7/24):   Per Dr. Zepeda no updated echo needed at this time.    Post Infusion Assessment:  Patient tolerated infusion without incident.  Blood return noted pre and post infusion.  Site patent and intact, free from redness, edema or discomfort.  No evidence of extravasations.  Access discontinued per protocol.       ONPRO  Was placed on patient's: left side of abdomen.    Was placed at 10:55 AM    Podpal used: Yes    ONPRO injector device Lot number: [id number]    Patient education included: what patient can expect after application, what colored lights mean on the device, when to remove device, when and where to call with questions or issues, all patients questions answered, and that Neulasta administration will occur at 2:00 PM.    Patient tolerated administration well.     Discharge Plan:   [facility]  Patient will return 12/13/23 for next appointment.   Patient discharged in stable condition accompanied by: self and son.  Departure Mode: Ambulatory.      Halima Bullock RN

## 2023-11-15 NOTE — PROGRESS NOTES
See IV flow sheet for details of port access. Labs sent: ca15-3, cbc, cmp. Port needle flushed per protocol and needle left in place for chemotherapy to follow.    Keira Brooks RN

## 2023-11-15 NOTE — NURSING NOTE
"Oncology Rooming Note    November 15, 2023 8:31 AM   Shaneka Alvarez is a 63 year old female who presents for:    Chief Complaint   Patient presents with    Oncology Clinic Visit     Prior to tx     Initial Vitals: BP (!) 153/94 (BP Location: Left arm, Patient Position: Chair, Cuff Size: Adult Regular)   Pulse 60   Temp 97.9  F (36.6  C) (Oral)   Ht 1.575 m (5' 2\")   Wt 57.2 kg (126 lb)   SpO2 100%   BMI 23.05 kg/m   Estimated body mass index is 23.05 kg/m  as calculated from the following:    Height as of this encounter: 1.575 m (5' 2\").    Weight as of this encounter: 57.2 kg (126 lb). Body surface area is 1.58 meters squared.  Mild Pain (3) Comment: Data Unavailable   No LMP recorded. Patient is postmenopausal.  Allergies reviewed: Yes  Medications reviewed: Yes    Medications: Medication refills not needed today.  Pharmacy name entered into [facility]:    [facility] - [place], [place] - [address] [place] [address] [facility] - [place], [place] - [address] [facility] - [address]  [facility] [place] [address] [facility]    Clinical concerns: NO  Dr. Zepeda was notified.      Eliza Thakur CMA              "

## 2023-11-15 NOTE — LETTER
11/15/2023         RE: Shaneka Alvarez  [address] [address] [facility] [id number]        Dear Colleague,    Thank you for referring your patient, Shaneka Alvarez, to the [facility]. Please see a copy of my visit note below.    ONCOLOGY FOLLOW UP NOTE: 11/15/23        I took the history from reviewing the previous notes that she was following with Dr. Amaya.  I have copied and updated from prior notes.    ONCOLOGY History:    1.  January 2006:  Diagnosed with Stage IIB, T2 N1 M0 invasive lobular carcinoma of the right breast.  Final pathology showed a 4.5 x 3.8 x 2.5 cm, 01/14 lymph nodes positive.  Estrogen, progesterone receptor positive, HER-2 negative.     2.  Genetics.  BRCA1 and 2 mutations not detected. 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       THERAPY TO DATE:   1. 2006:  ECOG 2104 protocol of dose-dense Adriamycin, Cytoxan and Avastin x4 cycles followed by Taxol and Avastin x4 cycles.   2.  03/2007:  Completed 1 year Avastin.   3.  11/2006-01/2007:  Radiotherapy to the right breast of 5040 cGy.   4.  03/[id number]:  Aromasin.  Stopped after moving to the [facility].   5.  01/2016:  Right modified radical mastectomy with latissimus dorsi flap reconstruction and a left prophylactic mastectomy with latissimus dorsi flap reconstruction.     She recently had MRI of the brain as a follow-up for multiple sclerosis and there were multiple calvarial lesions noted which was suspicious for metastatic disease.  There was no evidence of new multiple sclerosis lesions.  She had a PET scan on 8/30/2019 which showed widespread bone metastatic lesions (calvarium, spine, sacrum, pelvis, ribs most prominent at C7, T3, L1 and L4), hypermetabolic 3 cm lesion in LLL, and mediastinal/hilar LN. CEA wnl at 1.9 and C27-29 elevated to 415 (28 on 8/23/16). A CT guided biopsy of the right iliac bone was obtained on 9/4/19, pathology consistent with metastatic adenocarcinoma (breast),  ER/NM negative, HER-2 negative PDL 1 < 1%. A second biopsy was take of the LLL nodule via EBUS on 9/5/19 did not show any malignancy.    C/T/L spine MRI 8/3/19 to 9/2/19 with numerous enhancing lesions of the C, T and L spine, largest around T9 and L1. No evidence of cord compression. Has a L1 compression fracture and impending L4.     Received radiation T12-L5 3000 cGy in 10 fractions from 9/6/2019 till 9/18/2019.  Neurosurgery recommended no surgical intervention but to wear [place] brace when out of bed and HOB >30 degrees    She started palliative Xeloda 2000/1500 on 10/1/2019 but after just a few doses she noticed nausea, red eyes blurred vision, loss of appetite.  She stopped taking it after 5 doses and was seen by nurse practitioner on 10/7/2019 when she was improving.  At that point she was restarted on Xeloda at a lower dose of 1000 mg twice a day.  As she was not able to tolerate even the lower dose with extreme nausea and vomiting and feeling extremely fatigued and blurry vision, we decided on stopping Xeloda.    We decided on repeating scans and MRI brain on 10/25/2019 showed no intracranial metastatic disease.   Multiple enhancing calvarial lesions may be increased in number and are suggestive of metastatic disease. Thinner imaging on the current study may identify the smaller lesions and may be responsible for  identified more lesions.   There is a single focus of T2 hyperintense signal within the anterior right temporal lobe may represent interval demyelination. There is no evidence for active demyelination.    PET/CT on 11/1/2019 showed favorable response to therapy and Overall FDG uptake within scattered osseous metastases is decreased since 8/30/2019, particularly within the pelvis. Increased sclerotic appearance of L1 and L4 pathologic compression deformities, likely sequela of recently completed palliative radiation therapy.    No significant FDG uptake in previously demonstrated hypermetabolic  mediastinal lymph nodes. Biopsy negative on 9/5/2019.  There is also decreased FDG uptake in the left lower lobe (biopsy negative for  malignancy), now with dense consolidation containing air bronchograms suggestive of infection versus aspiration.  There is diffuse FDG uptake within the esophagus, consistent with esophagitis.    Because of intolerance to Xeloda we decided on switching to weekly paclitaxel on 11/5/2019.    C#2 Taxol 12/4/19- started with 70mg/m2 dose reduction    C#3 Taxol 12/30/2019     PET/CT on 1/24/2020 showed progression of the disease in some of the bone lesions including increase in size and lytic lesion within the vertebral body of C4 measuring 1 cm as opposed to 0.6 cm previously and a new central soft tissue nodule with SUV max 4.1 when previously it was non-hypermetabolic.  There is also some progression noted in the right proximal femur and right iliac bone.  There is decreased metabolic uptake in the right lamina of T9 with SUV max 4.7 when previously it was 8.0.  Other lesions are stable.    CA 27-29 also had increased significantly and it was 257 on 1/28/2020.    We decided on switching to eribulin on 1/28/2020.    C#2 2/18/2020  C#2 D#8 2/25/2020    C#3 D#1 3/18/2020 -delayed by 1 week as per patient's preference because she wanted to visit her family.    She had a repeat PET/CT on 3/27/2020 which showed stable size of the bone metastatic lesions although the FDG avidity was less, consistent with treatment response.    She had MRI of the thoracic spine on 4/3/2020 and it was compared to the one which was done in August 2019 and it showed increased size of several metastatic lesions most notably at T9 but also at T5-T7.  Other lesions at T10, T11 and T12 appeared improved.    CA 27-29 was also down to 222 on 3/18/2020.    Cycle #4 eribulin ( dose reduced to 1.2 mg/m2 )-4/7/2020-   Cycle #5 Eribulin ( 1.2 mg/m2 )- 4/28/2020   Cycle #6 Eribulin ( 1.2 mg/m2 )- 5/19/2020     Cycle #7  Eribulin ( 1.2 mg/m2 )- 6/9/2020    Cycle #8 Eribulin ( 1.2 mg/m2 )- 6/30/2020     She had a repeat PET scan on 7/17/2020 which showed incidental finding of new acute bilateral pulmonary embolism in the distal left main pulmonary artery extending in the left upper and lower lobes and in the segmental and branch arteries in the right lower lobe.    It also showed mildly increased FDG avidity in the lytic lesion in the T9 lamina and right pedicle with SUV max 5.3, previously 3.9.  That is also slightly increased FDG uptake to the left anterior fifth rib at the costochondral junction SUV max 3.9, previously 2.5.  There is slightly decreased FDG uptake in the right femoral neck lesion SUV max 2.2, previously 2.9.  FDG uptake in other bone lesions is fairly stable.  No new metastasis are seen.    CA 27-29 was 308 on 6/30/2020.  It was 286 on 5/19/2020.    Overall this is consistent with only slight progression versus stable disease.    She was started on Lovenox.    Cycle #9 eribulin 7/21/2020. CA 27-29 was 238    C#10 eribulin 8/18/2020. ( delayed by one week for ANC 0.9 )    C#11 Eribulin 9/8/2020    C#12 Eribulin 9/29/2020     C#13 Eribulin 10/20/2020     PET scan on 11/6/2020 shows progression of the disease with increased FDG uptake in the lytic lesions in the T9/T10 and right posterolateral elements as well as increased FDG uptake in the previously known hypermetabolic right iliac bone lesion suggesting recurrence of previously treated metastasis.  There is new subtle lesion in the right manubrium.  There is also a new fracture in the anterolateral left fourth rib with associated uptake and this could be a pathologic fracture.  Healing fracture in the left anterior fifth rib lesion.  There is resolution of the left pulmonary emboli.  Decreased FDG uptake of the esophagus consistent with improving inflammation.    CA 27-29 on 10/20/2020 was also elevated at 489.    C#1 Trodelvy on 11/11/2020.  Cycle #2 Trodelvy  12/2/2020  Cycle number 2-day #8 Trodelvy 12/8/2020.    12/15/2020-12/16/2020.  She was admitted to the hospital with nausea vomiting and dehydration.  She had tachycardia and initial lactic acid of 5.  It decreased to 1.4 after 2 L of normal saline.  She also had hypokalemia and ANC was 1.3.  She was treated with IV fluids.  Procalcitonin was negative.  CTA of the chest was negative for pulmonary embolism or pneumonia.  No bacterial infection was documented.  Her medication which she was initially unable to tolerate at home, were restarted and she was discharged home once started to feel better.      We delayed the start of cycle number 3 x 1 week and decrease the dose of chemotherapy by 25%.  She also had neutropenia with ANC of 1.0.      She started cycle number 3-day #1 on 12/29/2020.  CA 27-29 was 392.    Cycle number 3-day #8 1/5/2021.    C#4 Trodelvy 1/20/2021- 75% dose    2/5/2021.  PET/CT overall shows a good response to treatment with improvement of previously hypermetabolic lesions in the spine and pelvis and stability of other bone meta stasis.  There is a single lytic lesion in the right iliac bone which demonstrates slight Yimi increased FDG uptake and has SUV max 4.7 while previously it was SUV max 3.4.  There was diffuse wall thickening of the esophagus with uptake in the esophagus in the fundus of the stomach and this could be seen with inflammation.    Trodelvy cycle #5 - 2/10/2021.  75% of the dose.  CA 27-29 was 337.    Trodelvy cycle #6 - 3/3/2021.  75% of the dose.  Trodelvy cycle #6, D#8 - 3/10/2021.  75% of the dose.    Trodelvy C#8, D#8 on 4/21/2021  CA 27-29 stable at 371 on 4/14/2021    Trodelvy, cycle #9- 5/5/2021        On 2/25/2020 when she had biopsy of the right acetabulum and it was consistent with metastatic lobular carcinoma.  Again it was Triple Negative.     On 3/18/2020 when she also met with Dr. Arelis Arshad who also advised testing for androgen receptor studies on the biopsy  specimen.  Tumor was diffusely androgen receptor positive.      5/26/2021-cycle #10 Trodelvy     CA 27-29 was 545.    6/11/2021.  PET/CT shows mildly increased uptake in several bone lesions for example in the left anterior iliac crest, mid right iliac crest and left ischium.  Uptake in other bone lesions are similar to previously.    Because of minimal progression of the disease and she is tolerating chemotherapy very well, we decided on continuing the same chemotherapy.    6/16/2021-Trodelvy cycle #11    7/7/2021 -Trodelvy cycle #12    9/14/2021-Trodelvy-cycle #15, day #8.    9/8/2021-CA 27-29 was more elevated and it was 1176.    PET/CT on 9/24/2021 showed stable findings with no evidence of FDG avid metastatic disease within the body.  Left axillary lymph nodes are prominent and hypermetabolic and likely from recent vaccinations in the left deltoid muscle.  Healed bony metastasis seems stable.      Cycle #16, Trodelvy 9/28/2021.  Cycle #17, day #8 Trodelvy was on 10/26/2021.  Cycle #18, day #8 Trodelvy on 11/22/2021       She saw Dr. Mejia whom on 10/6/2021.  She was not considered eligible for Enfortumab trial.    Cycle #19, Trodelvy on 12/7/2021 12/21/2021.  PET/CT showed progression of bony metastatic disease.  There is increase hypermetabolism in both the right and left iliac bones and the sternum.  Other bone lesions are stable.    There is new scattered areas of groundglass throughout both the lungs and these findings are indeterminate but may represent an infectious etiology.  Interval resolution of left axillary FDG avid lymphadenopathy.    She was started on Casodex 150 mg daily on 1/6/2022.    She was admitted to the hospital from 3/17/2022-3/19/2022 for vomiting and diarrhea and  severe back pain.  I reviewed the discharge summary.  CT lumbar spine showed a stable severe chronic L1 pathologic compression fracture.  Stable mild compression deformity of superior endplate of L3 and superior endplate  of L4.  The upper margin of the L4 fracture extends slightly into the spinal canal without high-grade canal stenosis and this is also stable.  Nondisplaced fractures coursing through the L3 pedicles bilaterally were seen which were not clearly seen previously this could be acute and likely pathologic.  No extraspinal soft tissue abnormality.  Neurosurgery recommended conservative management.  Her pain was controlled and she was discharged home as she felt better with this.      3/31/2022-PET/CT shows progressive bone meta stasis with progressive FDG uptake in the following lesions. Left iliac crest lesion with max SUV of 7.2, previously 3.5. Right mid iliac crest lytic lesion shows maximum SUV of 7.8, previously 6.9.  T10 vertebral body mixed lytic and sclerotic lesion with an SUV of 7.1, previously not hypermetabolic. Thoracic vertebral bodies 8, 7, 5, and 4 also show hypermetabolic lesions were previously there was no hypermetabolism.  Some of the sclerotic osseous lesions are unchanged and do not show increased hypermetabolism compatible treated lesion such as a severe compression deformity at L1 as well as superior compression deformity at L4.  Medial right clavicle sclerotic lesion with SUV max of 4.8, previously not hypermetabolic. There is also right-sided sternal lesions.      4/14/2022--C#1- switch to single agent Doxil 50 mg per metered squared every 4 weeks.    5/13/2022-cycle #2- Doxil- dose reduced to 40 mg per metered square due to severe nausea/vomiting and migraines requiring IV fluids and IV antiemetics.    6/10/2022-cycle #3-Doxil 40 mg per metered square    7/1/2022- PET/CT shows resolution of the hypermetabolic skeletal metastasis.    7/6/2022-cycle #4-Doxil 40 mg per metered square  8/5/2022- cycle #5-Doxil  9/1/2022.  Cycle #6-Doxil  9/30/2022- cycle #7-Doxil    10/18/2022.  PET/CT does not show evidence of any FDG activity    11/2/2022-cycle #8-Doxil.  11/30/2022-cycle  #9-Doxil.  12/28/2022-cycle #10-Doxil    2/3/2023- C#11- Doxil (delayed by 1 week because neutrophil count was 0.9)-wanted to give her Onpro but at that time it was not approved by insurance.    3/3/2023.  Cycle #12 Doxil.  Given with Onpro.    Repeat PET/CT on 3/27/2023 overall showed very stable findings.  PET/CT showed focal FDG avidity in the heart. This is in an expected location of AV node or mitral valve.  This is nonspecific.  We checked an echocardiogram on 4/20/2023 which was unremarkable.  She is asymptomatic.  Continue to monitor clinically.      3/31/2023.  Cycle #13 Doxil.  Given with Onpro.     4/12/2023-she presented to ED with increased migraine headaches and nausea and vomiting.  She was treated symptomatically and was discharged home after she started to feel better.      4/28/2023.  Cycle #14 Doxil.  Given with Onpro    Cycle #16 Doxil with Onpro 6/23/2023    Cycle #17 Doxil with Onpro 7/21/2023      CA 15-3 has been slowly rising.    Repeat PET/CT on 8/8/2023 showed pretty stable findings.    She has been having more pain in the lumbar spine/low back.    8/18/2023.  Cycle #18 Doxil with Onpro    9/15/2023.  Cycle #19 Doxil with Onpro.    10/16/2023.  Cycle #20 Doxil with Onpro.    11/10/2023.  PET/CT overall showed pretty stable findings.    11/15/2023.  Cycle #21 Doxil with Onpro is planned.      Interval history.  She tells me that she did not get much improvement after getting SI joint steroid injections on 10/13/2023.  But with the increased dose of buprenorphine buccal film, her pain is decently controlled.  She very occasionally has to take as needed morphine.  She mainly has pain only in the bilateral SI joint area.  No new pain.  She is tolerating chemotherapy well without nausea vomiting diarrhea constipation.  Migraines are much better controlled.  She is on Eliquis.  No bleeding.  No shortness of breath.  Neuropathy is mild and stable.      ECOG 1    ROS:  Rest of the comprehensive  review of the system was negative.        I reviewed other history in epic as below.       PAST MEDICAL HISTORY:     1.  Breast cancer  2.  Multiple sclerosis.   3.  Depression.   4.  Migraines.   5.  Hypertension.   6.  Cholecystectomy and umbilical hernia repair.     SOCIAL HISTORY: She smoked for 5 years but quit many years ago.  Drinks alcohol socially.  She lives with her .  She is a teacher in middle school.       FAMILY HISTORY: She mentions that her mother had breast cancer at 49.  Both grandmothers had breast cancer but she is not sure of the age.  A couple of cousins have cancers.  Patient has 3 children who are healthy.    Current Outpatient Medications   Medication     apixaban ANTICOAGULANT (ELIQUIS ANTICOAGULANT) 5 MG tablet     Buprenorphine HCl (BELBUCA) 450 MCG FILM buccal film     busPIRone (BUSPAR) 15 MG tablet     calcium citrate-vitamin D (CITRACAL) 315-250 MG-UNIT TABS     Cholecalciferol (VITAMIN D3 PO)     galcanezumab-gnlm (EMGALITY) 120 MG/ML injection     LORazepam (ATIVAN) 0.5 MG tablet     metoclopramide (REGLAN) 5 MG tablet     morphine (MSIR) 15 MG IR tablet     OLANZapine (ZYPREXA) 5 MG tablet     ondansetron (ZOFRAN ODT) 4 MG ODT tab     propranolol ER (INDERAL LA) 80 MG 24 hr capsule     propranolol ER (INDERAL LA) 80 MG 24 hr capsule     traZODone (DESYREL) 50 MG tablet     ubrogepant (UBRELVY) 100 MG tablet     venlafaxine (EFFEXOR XR) 75 MG 24 hr capsule     vitamin B-2 (RIBOFLAVIN) 25 MG TABS tablet     ZOLMitriptan (ZOMIG) 5 MG nasal spray     No current facility-administered medications for this visit.     Facility-Administered Medications Ordered in Other Visits   Medication     heparin 100 unit/mL injection 5 mL     sodium chloride (PF) 0.9% PF flush 10 mL        Allergies   Allergen Reactions     Bactrim [Sulfamethoxazole W/Trimethoprim]      Internal bleeding     Copaxone [Glatiramer] Hives          PHYSICAL EXAMINATION:     BP (!) 153/94 (BP Location: Left arm,  "Patient Position: Chair, Cuff Size: Adult Regular)   Pulse 60   Temp 97.9  F (36.6  C) (Oral)   Ht 1.575 m (5' 2\")   Wt 57.2 kg (126 lb)   SpO2 100%   BMI 23.05 kg/m    Wt Readings from Last 4 Encounters:   11/15/23 57.2 kg (126 lb)   10/25/23 56.7 kg (125 lb)   10/16/23 58.4 kg (128 lb 12.8 oz)   10/13/23 56.7 kg (125 lb)       CONSTITUTIONAL: no acute distress  EYES: PERRLA, no palor or icterus.   ENT/MOUTH: no mouth lesions. Ears normal  CVS: s1s2 no m r g .   RESPIRATORY: clear to auscultation b/l  GI: soft non tender no hepatosplenomegaly  NEURO: AAOX3  Grossly non focal neuro exam  INTEGUMENT: no obvious rashes.  No subcutaneous nodule at the back of the scalp is a stable.  LYMPHATIC: no palpable cervical, supraclavicular, axillary or inguinal LAD  MUSCULOSKELETAL: Unremarkable. No bony tenderness.   EXTREMITIES: no edema  PSYCH: Mentation, mood and affect are normal. Decision making capacity is intact          Labs and Imaging.    Reviewed.    11/15/2023   CBC shows WBC 4.1.  Hemoglobin 11.1.  Platelets 161.  CMP shows calcium 8.7.  Albumin 3.4.    CA 15-3 was 237 on 10/16/2023    CA 15-3 was 242 on 9/15/2023    CA 15-3 was 193 on 8/18/2023 7/21/2023.    CA 15-3 was 186.    7/3/2023    CA 15-3 was 177 on 6/23/2023      CA 15-3 was 129 on 3/31/2023      CA 15-3 was 123 on 3/3/2023.      11/30/2022     CA 15-3 was 135.    9/30/2022  CA 15-3 was 159.  CA 27-29 was 1992 on 6/10/2022  CA 27-29 was 3370 on 5/13/2022.  It was 5307 on 4/14/2022 ferritin Doxil was started.      9/28/2021.      Iron studies, ferritin is 101, iron 51, TIBC 268 and iron saturation index 19%.        PET/CT on 11/10/2023  COMPARISON: PET 8/8/2023        FINDINGS:      HEAD/NECK:  There is no suspicious FDG uptake in the neck.     The paranasal sinuses are clear. The mastoid air cells are clear.      No hypermetabolic lymph nodes are demonstrated in the neck.      The mucosal and deep spaces of the neck are unremarkable. The " major  salivary glands are unremarkable. The thyroid is unremarkable. The  major vasculature of the neck are patent.     CHEST:  Bilateral mastectomies with breast implants.  There is no suspicious FDG uptake in the chest.     The central tracheobronchial tree is clear. No pleural effusion or  pneumothorax. No acute consolidation.      No hypermetabolic lymph nodes are demonstrated in the chest.     Heart size is within normal limits. No pericardial effusion. The  thoracic aorta and main pulmonary artery are within normal limits for  diameter. The esophagus is unremarkable.      ABDOMEN AND PELVIS:  There is no suspicious FDG uptake in the abdomen or pelvis.     The liver is unremarkable. Cholecystectomy clips. No intrahepatic or  extrahepatic biliary ductal dilatation. The pancreas is unremarkable.  No pancreatic ductal dilatation. The spleen is unremarkable. The  adrenal glands are unremarkable.     Symmetric enhancement of the kidneys. No hydronephrosis. The urinary  bladder is unremarkable. Uterus is unremarkable. Ovaries are absent.  No hypermetabolic lymph nodes are demonstrated in the abdomen or  pelvis.     Normal caliber of the small and large bowel. No free air, free fluid,  or fluid collection. Normal caliber of the abdominal aorta.     LOWER EXTREMITIES:   There is no suspicious FDG uptake in the visualized lower extremities.     BONES: Again demonstrated are numerous sclerotic and lytic lesions  some of which may remains mildly hypermetabolic. FDG uptake in bone  metastases are not substantially changed, with a few lesions showing  mild FDG increases, others decreased, and others not significantly  changed. Examples include:     Decreased FDG:    T8 vertebral body max SUV 3.1 (prior 4.3).  T9 left posterior lytic lesion max SUV 3.1 (prior 3.4)     Increased FDG:   A left ninth rib costovertebral chondral junction max SUV 3 (prior  2.6.)  Right sixth rib anteriorly max SUV 3.8 prior 2.7.     Not  significantly changed:  Right second rib anterolateral focus of hypermetabolism max SUV 3.9,  not significantly changed from prior.   Right iliac bone max SUV 3.6 (prior 3.9).     Multiple old right rib fractures. Left 10th rib the deformity with  callus. A severe compression deformity of L1 unchanged.                                                                      IMPRESSION: In this patient with history of breast cancer:  Overall  stable disease.  Again demonstrated are numerous metastatic bone lesions, some of which  have mildly elevated FDG activity. Some have increased slightly,  others have decreased slightly,  others are not significantly changed.      CT lumbar spine on 8/28/2023  IMPRESSION:  1.  No evidence for acute displaced fracture. Fractures of the L1, L3 and L4 vertebral bodies appear stable from 07/31/2023.  2.  No evidence of traumatic subluxation.  3.  Diffuse osseous metastatic disease.    MRI of the lumbar spine on 7/31/2023  COMPARISON: PET/CT 03/27/2023.  TECHNIQUE: MRI Lumbar Spine without IV contrast.     FINDINGS: Examination is mildly limited by patient motion artifact.     Alignment is unchanged. Redemonstrated heterogeneous bone marrow signal throughout the visualized lumbosacral spine including numerous focal abnormal T1 hypointense and STIR hyperintense marrow replacing lesions, including confluent lesions at T12 and S2   vertebrae. Similar chronic L1 pathologic burst fracture with mild superior endplate retropulsion and severe anterior-central vertebral body height loss. Likewise, similar chronic L4 superior endplate fracture with associated mild retropulsion. Large   Schmorl's node at the L2 inferior endplate, as before. Vertical defects in the bilateral L3 pedicles are redemonstrated compatible with chronic pathologic fractures. Asymmetric STIR hyperintense signal abnormality in the left sacral ala partially   visualized, which may represent additional confluent pathologic  marrow replacement. Otherwise, remaining lumbar vertebral body heights are maintained. Conus signal is normal and terminates at L2. No perivertebral soft tissue edema. There is mild edema   within the bilateral multifidus and erector spinae muscles, which may indicate posttraumatic injury/strain. Superficial subcutaneous soft tissue edema may be posttraumatic or related to positioning. Prominence of bile duct may be related to   postcholecystectomy physiologic ectasia. Small/subcentimeter right renal cyst. Last fully formed disc is designated L5-S1. Multilevel disc degeneration and height loss, severe at L5-S1.     T12-L1: No disc without bulge or protrusion. Facet joints are normal. Mild L1 superior endplate retropulsion indents the ventral thecal sac without spinal canal stenosis. No foraminal stenosis.     L1-L2: No disc without bulge or protrusion. Facet joints are normal. No spinal canal or foraminal stenosis.     L2-L3: Bilobed left asymmetric diffuse disc bulge. Mild facet arthropathy with ligamentum flavum thickening. Mild left foraminal stenosis. No right foraminal or spinal canal stenosis.     L3-L4: Bilobed left asymmetric diffuse disc bulge. Mild facet arthropathy with ligamentum flavum thickening. In conjunction with superior endplate retropulsion, there is borderline mild central spinal canal stenosis and mild left subarticular recess   narrowing. Mild right and moderate left foraminal stenosis.     L4-L5: Small left foraminal disc protrusion. Moderate facet arthropathy. No spinal canal or foraminal stenosis.     L5-S1: No disc without bulge or protrusion. Facet joints are normal. No spinal canal or foraminal stenosis.                                                                      IMPRESSION:  1.  Extensive osseous metastases as well as chronic pathologic fractures at L1, L3, and L4, as detailed.  2.  Partially visualized asymmetric signal abnormality in the left sacral ala may represent  confluent pathologic marrow replacement. Marrow edema related to sacral fracture is difficult to exclude in the appropriate clinical context.  3.  Posterior paraspinous muscular edema can be compatible with posttraumatic injury/strain.  4.  Multilevel degenerative changes, as detailed.        7/6/2023.  MRI brain  IMPRESSION:  1.  No acute ischemia, mass/mass effect, or abnormal intracranial enhancement.   2.  Similar sequela from demyelinating disease.  3.  Osseous metastases, as before.      Echocardiogram 4/20/2023  Left ventricular size, wall motion and function are normal. The ejection  fraction is 60-65%.  Global right ventricular function is normal.  No hemodynamically significant valve abnormalities.  The inferior vena cava is normal.  No pericardial effusion.     This study was compared with the study from 4/7/22. No significant change.      3/27/2023.  PET/CT  1. No new suspicious FDG uptake within the skeleton.  2. Stable numerous non-FDG avid lytic and sclerotic osseous lesions  throughout the axial skeleton.  3. Stable nondisplaced fracture of the left acromion/glenoid, chronic  bilateral healing rib fractures, and compression deformities of the  thoracic and lumbar spine.  4. Hypermetabolic focus in the expected location of the AV node versus  mitral valve, this may represent a normal AV node versus mitral valve  pathology such as vegetations. Recommend follow-up with cardiac  Ultrasound.        11/30/2022-x-ray of the ribs of the left side  There are a few sclerotic lesion seen in the right anterior ribs that were seen on previous PET CTs. These appear to involve the fourth and fifth ribs. There are likely more subtle lesions that were   described on the PET CT scan. Irregularity of the distal end of the left ninth rib may be from metastatic disease or fracture on the oblique view which likely is chronic.       10/18/2022-PET/CT showed no new suspicious FDG uptake within the skeleton.  Stable abnormal  FDG avid lytic and sclerotic osseous lesions.  Mild diffuse FDG uptake throughout the large bowel without CT abnormality.    7/1/2022.  PET/CT shows diffuse sclerotic and lytic osseous lesions without any abnormal FDG uptake in the skeleton, consistent with treated disease.  There is evaluation of previous hypermetabolic bone lesions.  Multiple chronic rib fracture deformities and stable compression fracture deformities of T6, L1 and L4.  No suspicious FDG uptake in the chest abdomen or pelvis.    3/31/2022-PET/CT shows progressive bone metastasis with progressive FDG uptake in the following lesions. Left iliac crest lesion with max SUV of 7.2, previously 3.5. Right mid iliac crest lytic lesion shows maximum SUV of 7.8, previously 6.9.  T10 vertebral body mixed lytic and sclerotic lesion with an SUV of 7.1, previously not hypermetabolic. Thoracic vertebral bodies 8, 7, 5, and 4 also show hypermetabolic lesions were previously there was no hypermetabolism.  Some of the sclerotic osseous lesions are unchanged and do not show increased hypermetabolism compatible treated lesion such as a severe compression deformity at L1 as well as superior compression deformity at L4.  Medial right clavicle sclerotic lesion with SUV max of 4.8, previously not hypermetabolic. There is also right-sided sternal lesions.      Biopsy from the right acetabulum shows metastatic lobular carcinoma.  It is triple negative.  Androgen receptor was diffusely positive (90%, moderate intensity) by immunohistochemistry. )  PD-L1 negative.    Foundation one showed CDH1 mutation (initially lobular cancer), CCND1 amplification ( equivocal ). FGF3, FGF4, FGF19 amplification ( Equivocal ). Most promising is CCND1 amplification with abemaciclib showing response in 4/10 patients. FGF3,FGF4 and FGF19 are targetable with pan-FGFR inhibitors.           ASSESSMENT AND PLAN:     Metastatic breast cancer negative breast cancer (androgen receptor positive ) with  extensive involvement of the bones.  There is also a left lower lobe hypermetabolic lesion and mediastinal lymph nodes which are hypermetabolic.  The biopsy from the right iliac bone is consistent with metastatic lobular breast cancer but it is hormone receptor and HER-2 negative and PDL 1 is also negative.    Foundation 1 testing did not reveal any actionable mutations.    She was intolerant to Xeloda and progressed on Taxol and eribulin.      We then switched to recently FDA approved sacituzumab govitecan-hziy (Trodelvy) for TNBC, based on the results of the phase II IMMU-132-01 clinical trial.   She started this on 11/11/2020.      She obtained a second opinion from Dr. Castillo from [facility] who recommended a repeat biopsy to be done to make sure that she really has triple negative breast cancer.      She also met with Dr. Arelis Arshad on 3/18/2021.      After 10 cycles of Trodelvy, she had PET/CT on 6/11/2021 which showed mildly increased uptake in some of the bone lesions while stability in other bone lesions.        After 15 cycles, repeat PET scan showed stable findings.  CA 27-29 has been increasing and it is 1176.      As she was tolerating the chemotherapy well and her quality of life has been decent and scans were stable although she had a rising CA 27-29, we decided on continuing the same chemotherapy because it has been a very slow progression of the disease.    Then she had clear progression of the disease in the bones with increased FDG uptake in both right and left iliac bones and the sternum.    Foundation one showed CDH1 mutation (initially lobular cancer), CCND1 amplification ( equivocal ). FGF3, FGF4, FGF19 amplification ( Equivocal ). Most promising is CCND1 amplification with abemaciclib showing response in 4/10 patients. FGF3,FGF4 and FGF19 are targetable with pan-FGFR inhibitor    As the tumor is diffusely positive for androgen receptor, we decided to start her on androgen  receptor blockers.    I initially recommended enzalutamide 160 mg daily ( Enzalutamide for the Treatment of Androgen Receptor-Expressing Triple-Negative Breast Cancer----J Clin Oncol. 2018 Mar 20; 36(9): 884-890. ).    Eventually we decided to start her on Casodex 150 mg daily instead of Enzalutamide due to cost issues.  Clinical Trial Clin Cancer Res. 2013 Oct 1;19(19):5505-12.   Phase II trial of bicalutamide in patients with androgen receptor-positive, estrogen receptor-negative metastatic Breast Cancer  She started Casodex on 1/6/2022.    She had progressive disease in the bones on the PET scan on 3/31/2022.    We changed to single agent Doxil on 4/14/2022.      She developed significant nausea vomiting and headache so cycle #2 was given on 5/13/2022 with 20% dose reduction which she tolerated well.    Cycle #3 was given on 6/10/2022 with the same dose reduced Doxil.    Repeat PET/CT on 7/1/2022 shows resolution of the FDG avid bony metastasis consistent with treated disease.  No suspicious FDG uptake was seen in the chest abdomen and pelvis.  CA 27-29 was also coming down.     Repeat PET/CT in October 2022 after completing 7 cycles continued to show normal FDG uptake.  CA 15-3 was trending down     Cycle #11 was delayed by 1 week because of chemotherapy-induced neutropenia.  It was received on 2/3/2023.      She received cycle #12 on 3/3/2023 with Onpro.    Repeat PET/CT on 3/27/2023 overall showed very stable findings.    Cycle 13 was given on 3/31/2023 with Onpro support.      Cycle #14 was on 4/28/2023.   CA 15-3 was stable at 115.     CA 15-3 has been slowly rising.    Repeat PET/CT on 8/8/2023 showed pretty stable findings.    She has been having more pain in the lumbar spine/low back.    Otherwise tolerating chemotherapy well.  As the progression has been very slow and no mild and overall she has been tolerating chemotherapy well, we decided to continue the same chemotherapy.    Over time CA 15-3 has  continued to slowly increase but the PET scan on 11/10/2023 overall showed very stable findings.    As she is tolerating the chemotherapy well, I believe it is reasonable to continue with the same chemotherapy and not change the treatment because in the big picture cancers remains in decent control and treatments are being tolerated very well.    In the future we can potentially consider another chemotherapy like carboplatin or gemcitabine.    Chemotherapy-induced neutropenia.  We will continue to give her Onpro.      Anemia.  She has mild anemia from chemotherapy.  Continue to observe.      Bone disease.  She has metastatic disease to the bone.  Previously she got palliative radiation to T12-L5 3000 cGy in 10 fractions from 9/6/2019 till 9/18/2019.  She was evaluated by orthopedics Dr. Bipin Elliott on 11/1/2019 and conservative management was recommended.    She was on Zometa every 3 months. She last received on 9/29/2020.  Because of progression of the disease in the bones, we switched to Xgeva which she received on 11/11/2020.    She had progression of the disease in the bones so we switched to Doxil but we continued xgeva.  PET scan on 7/1/2022 does not show any suspicious FDG uptake in the bones consistent with treated disease.  Repeat PET/CT in March 2023 was without any active FDG avid lesions.  Overall PET scan from 11/10/2023 remains pretty stable with slight decrease in FDG avidity of some lesions was a slight increase in some but overall showing pretty stable findings.  Continue Xgeva calcium and vitamin D.      Cancer related pain.  Following with palliative care.  Recently buprenorphine buccal film dose was increased which has helped.  She was also seen by Dr. Yusuf from pain clinic and she got bilateral SI joint steroid injections on 10/13/2023 which did not help much.  She occasionally takes as needed morphine.  Following with Dr. Whitaker and I reviewed the notes.  She is being enrolled in  medical cannabis program.      Migraines.  Doing much better with Emgality.  She takes as needed Ubrelvy which helps.  She is following with neurology.      Bilateral pulmonary emboli.  Doing well with Eliquis.        We did not address the following today.      Shingles.  The rash has dried out.  She completed well with acyclovir and prednisone.  She takes gabapentin for postherpetic neuralgia and is doing better.      Neuropathy.  This remains mild and stable with neuropathy in her hands.    This is in addition to the chronic balance issues and heat and cold sensitivity from underlying multiple sclerosis which is also stable for years.  Continue to monitor.     Subcutaneous nodule in the scalp.  This looks like an epidermoid cyst.  She thinks it is a stable.  Continue to monitor.       Insomnia.  Continue trazodone.      Wall thickening of esophagus and FDG uptake of fundus of the stomach.  It could be in the setting of inflammation from recent nausea and vomiting.  Symptoms have resolved.  Continue to monitor clinically.    Vision changes.    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  It was thought that this could be due to Taxol as patient reported to the ophthalmologist that she was on Taxol in September 2019 when her symptoms started.  But she was not on Taxol in September 2019 when she started noticing the visual changes so Taxol is not responsible for this.  The first dose of Taxol was on 11/5/2019.   She had left cataract extraction on 2/8/2021 and she has noticed significant improvement in her vision.  Thinks that her vision is back to her usual.      Increased FDG ability in the colon.  She had FDG uptake in the cecum and ascending colon but no corresponding lesion was seen on the CT scan.  She is completely asymptomatic so at this time we will continue to observe.    Discussion regarding healthcare directives.  On previous visit she told me that she will bring the  health care directive for our records but she forgot so I told her to bring it on next visit.      Breast implant removal.  She tells me that she was planning to do breast implant removal because there was a recall on this.  Her surgery was scheduled for 9/24/2019.  Because of this new development of metastatic breast cancer and her recent radiation, surgery has been canceled.  We discussed that at this time treatment for metastatic breast cancer would take precedence over the other surgery as the other surgery would require her to be off chemotherapy for several weeks and in that case the chances of cancer progression would be high and I would not recommend that.    Multiple sclerosis.  She will continue to follow with her neurologist Dr. Quiles.  Currently multiple sclerosis is under control and currently she is not taking any medications.    I will see her back on 12/5/2023.  She will get chemotherapy on 12/13/2023.    All of her questions were answered to her satisfaction.  She is agreeable and comfortable with the plan.    Dewayne Zepeda MD          Again, thank you for allowing me to participate in the care of your patient.        Sincerely,        Dewayne Zepeda MD

## 2023-12-05 NOTE — PROGRESS NOTES
ONCOLOGY FOLLOW UP NOTE: 12/05/23        I took the history from reviewing the previous notes that she was following with Dr. Amaya.  I have copied and updated from prior notes.    ONCOLOGY History:    1.  January 2006:  Diagnosed with Stage IIB, T2 N1 M0 invasive lobular carcinoma of the right breast.  Final pathology showed a 4.5 x 3.8 x 2.5 cm, 01/14 lymph nodes positive.  Estrogen, progesterone receptor positive, HER-2 negative.     2.  Genetics.  BRCA1 and 2 mutations not detected. 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       THERAPY TO DATE:   1.  2006:  ECOG 2104 protocol of dose-dense Adriamycin, Cytoxan and Avastin x4 cycles followed by Taxol and Avastin x4 cycles.   2.  03/2007:  Completed 1 year Avastin.   3.  11/2006-01/2007:  Radiotherapy to the right breast of 5040 cGy.   4.  [id number]/[id number]:  Aromasin.  Stopped after moving to the [facility].   5.  01/2016:  Right modified radical mastectomy with latissimus dorsi flap reconstruction and a left prophylactic mastectomy with latissimus dorsi flap reconstruction.     She recently had MRI of the brain as a follow-up for multiple sclerosis and there were multiple calvarial lesions noted which was suspicious for metastatic disease.  There was no evidence of new multiple sclerosis lesions.  She had a PET scan on 8/30/2019 which showed widespread bone metastatic lesions (calvarium, spine, sacrum, pelvis, ribs most prominent at C7, T3, L1 and L4), hypermetabolic 3 cm lesion in LLL, and mediastinal/hilar LN. CEA wnl at 1.9 and C27-29 elevated to 415 (28 on 8/23/16). A CT guided biopsy of the right iliac bone was obtained on 9/4/19, pathology consistent with metastatic adenocarcinoma (breast), ER/AR negative, HER-2 negative PDL 1 < 1%. A second biopsy was take of the LLL nodule via EBUS on 9/5/19 did not show any malignancy.    C/T/L spine MRI 8/3/19 to 9/2/19 with numerous enhancing lesions of the C, T and L spine, largest around T9 and L1. No evidence  of cord compression. Has a L1 compression fracture and impending L4.     Received radiation T12-L5 3000 cGy in 10 fractions from 9/6/2019 till 9/18/2019.  Neurosurgery recommended no surgical intervention but to wear Gorge brace when out of bed and HOB >30 degrees    She started palliative Xeloda 2000/1500 on 10/1/2019 but after just a few doses she noticed nausea, red eyes blurred vision, loss of appetite.  She stopped taking it after 5 doses and was seen by nurse practitioner on 10/7/2019 when she was improving.  At that point she was restarted on Xeloda at a lower dose of 1000 mg twice a day.  As she was not able to tolerate even the lower dose with extreme nausea and vomiting and feeling extremely fatigued and blurry vision, we decided on stopping Xeloda.    We decided on repeating scans and MRI brain on 10/25/2019 showed no intracranial metastatic disease.   Multiple enhancing calvarial lesions may be increased in number and are suggestive of metastatic disease. Thinner imaging on the current study may identify the smaller lesions and may be responsible for  identified more lesions.   There is a single focus of T2 hyperintense signal within the anterior right temporal lobe may represent interval demyelination. There is no evidence for active demyelination.    PET/CT on 11/1/2019 showed favorable response to therapy and Overall FDG uptake within scattered osseous metastases is decreased since 8/30/2019, particularly within the pelvis. Increased sclerotic appearance of L1 and L4 pathologic compression deformities, likely sequela of recently completed palliative radiation therapy.    No significant FDG uptake in previously demonstrated hypermetabolic mediastinal lymph nodes. Biopsy negative on 9/5/2019.  There is also decreased FDG uptake in the left lower lobe (biopsy negative for  malignancy), now with dense consolidation containing air bronchograms suggestive of infection versus aspiration.  There is diffuse  FDG uptake within the esophagus, consistent with esophagitis.    Because of intolerance to Xeloda we decided on switching to weekly paclitaxel on 11/5/2019.    C#2 Taxol 12/4/19- started with 70mg/m2 dose reduction    C#3 Taxol 12/30/2019     PET/CT on 1/24/2020 showed progression of the disease in some of the bone lesions including increase in size and lytic lesion within the vertebral body of C4 measuring 1 cm as opposed to 0.6 cm previously and a new central soft tissue nodule with SUV max 4.1 when previously it was non-hypermetabolic.  There is also some progression noted in the right proximal femur and right iliac bone.  There is decreased metabolic uptake in the right lamina of T9 with SUV max 4.7 when previously it was 8.0.  Other lesions are stable.    CA 27-29 also had increased significantly and it was 257 on 1/28/2020.    We decided on switching to eribulin on 1/28/2020.    C#2 2/18/2020  C#2 D#8 2/25/2020    C#3 D#1 3/18/2020 -delayed by 1 week as per patient's preference because she wanted to visit her family.    She had a repeat PET/CT on 3/27/2020 which showed stable size of the bone metastatic lesions although the FDG avidity was less, consistent with treatment response.    She had MRI of the thoracic spine on 4/3/2020 and it was compared to the one which was done in August 2019 and it showed increased size of several metastatic lesions most notably at T9 but also at T5-T7.  Other lesions at T10, T11 and T12 appeared improved.    CA 27-29 was also down to 222 on 3/18/2020.    Cycle #4 eribulin ( dose reduced to 1.2 mg/m2 )-4/7/2020-   Cycle #5 Eribulin ( 1.2 mg/m2 )- 4/28/2020   Cycle #6 Eribulin ( 1.2 mg/m2 )- 5/19/2020     Cycle #7 Eribulin ( 1.2 mg/m2 )- 6/9/2020    Cycle #8 Eribulin ( 1.2 mg/m2 )- 6/30/2020     She had a repeat PET scan on 7/17/2020 which showed incidental finding of new acute bilateral pulmonary embolism in the distal left main pulmonary artery extending in the left upper and  lower lobes and in the segmental and branch arteries in the right lower lobe.    It also showed mildly increased FDG avidity in the lytic lesion in the T9 lamina and right pedicle with SUV max 5.3, previously 3.9.  That is also slightly increased FDG uptake to the left anterior fifth rib at the costochondral junction SUV max 3.9, previously 2.5.  There is slightly decreased FDG uptake in the right femoral neck lesion SUV max 2.2, previously 2.9.  FDG uptake in other bone lesions is fairly stable.  No new metastasis are seen.    CA 27-29 was 308 on 6/30/2020.  It was 286 on 5/19/2020.    Overall this is consistent with only slight progression versus stable disease.    She was started on Lovenox.    Cycle #9 eribulin 7/21/2020. CA 27-29 was 238    C#10 eribulin 8/18/2020. ( delayed by one week for ANC 0.9 )    C#11 Eribulin 9/8/2020    C#12 Eribulin 9/29/2020     C#13 Eribulin 10/20/2020     PET scan on 11/6/2020 shows progression of the disease with increased FDG uptake in the lytic lesions in the T9/T10 and right posterolateral elements as well as increased FDG uptake in the previously known hypermetabolic right iliac bone lesion suggesting recurrence of previously treated metastasis.  There is new subtle lesion in the right manubrium.  There is also a new fracture in the anterolateral left fourth rib with associated uptake and this could be a pathologic fracture.  Healing fracture in the left anterior fifth rib lesion.  There is resolution of the left pulmonary emboli.  Decreased FDG uptake of the esophagus consistent with improving inflammation.    CA 27-29 on 10/20/2020 was also elevated at 489.    C#1 Trodelvy on 11/11/2020.  Cycle #2 Trodelvy 12/2/2020  Cycle number 2-day #8 Trodelvy 12/8/2020.    12/15/2020-12/16/2020.  She was admitted to the hospital with nausea vomiting and dehydration.  She had tachycardia and initial lactic acid of 5.  It decreased to 1.4 after 2 L of normal saline.  She also had  hypokalemia and ANC was 1.3.  She was treated with IV fluids.  Procalcitonin was negative.  CTA of the chest was negative for pulmonary embolism or pneumonia.  No bacterial infection was documented.  Her medication which she was initially unable to tolerate at home, were restarted and she was discharged home once started to feel better.      We delayed the start of cycle number 3 x 1 week and decrease the dose of chemotherapy by 25%.  She also had neutropenia with ANC of 1.0.      She started cycle number 3-day #1 on 12/29/2020.  CA 27-29 was 392.    Cycle number 3-day #8 1/5/2021.    C#4 Trodelvy 1/20/2021- 75% dose    2/5/2021.  PET/CT overall shows a good response to treatment with improvement of previously hypermetabolic lesions in the spine and pelvis and stability of other bone meta stasis.  There is a single lytic lesion in the right iliac bone which demonstrates slight Yimi increased FDG uptake and has SUV max 4.7 while previously it was SUV max 3.4.  There was diffuse wall thickening of the esophagus with uptake in the esophagus in the fundus of the stomach and this could be seen with inflammation.    Trodelvy cycle #5 - 2/10/2021.  75% of the dose.  CA 27-29 was 337.    Trodelvy cycle #6 - 3/3/2021.  75% of the dose.  Trodelvy cycle #6, D#8 - 3/10/2021.  75% of the dose.    Trodelvy C#8, D#8 on 4/21/2021  CA 27-29 stable at 371 on 4/14/2021    Trodelvy, cycle #9- 5/5/2021        On 2/25/2020 when she had biopsy of the right acetabulum and it was consistent with metastatic lobular carcinoma.  Again it was Triple Negative.     On 3/18/2020 when she also met with Dr. Arelis Arshad who also advised testing for androgen receptor studies on the biopsy specimen.  Tumor was diffusely androgen receptor positive.      5/26/2021-cycle #10 Trodelvy     CA 27-29 was 545.    6/11/2021.  PET/CT shows mildly increased uptake in several bone lesions for example in the left anterior iliac crest, mid right iliac crest and left  ischium.  Uptake in other bone lesions are similar to previously.    Because of minimal progression of the disease and she is tolerating chemotherapy very well, we decided on continuing the same chemotherapy.    6/16/2021-Trodelvy cycle #11    7/7/2021 -Trodelvy cycle #12    9/14/2021-Trodelvy-cycle #15, day #8.    9/8/2021-CA 27-29 was more elevated and it was 1176.    PET/CT on 9/24/2021 showed stable findings with no evidence of FDG avid metastatic disease within the body.  Left axillary lymph nodes are prominent and hypermetabolic and likely from recent vaccinations in the left deltoid muscle.  Healed bony metastasis seems stable.      Cycle #16, Trodelvy 9/28/2021.  Cycle #17, day #8 Trodelvy was on 10/26/2021.  Cycle #18, day #8 Trodelvy on 11/22/2021       She saw Dr. Mejia whom on 10/6/2021.  She was not considered eligible for Enfortumab trial.    Cycle #19, Trodelvy on 12/7/2021 12/21/2021.  PET/CT showed progression of bony metastatic disease.  There is increase hypermetabolism in both the right and left iliac bones and the sternum.  Other bone lesions are stable.    There is new scattered areas of groundglass throughout both the lungs and these findings are indeterminate but may represent an infectious etiology.  Interval resolution of left axillary FDG avid lymphadenopathy.    She was started on Casodex 150 mg daily on 1/6/2022.    She was admitted to the hospital from 3/17/2022-3/19/2022 for vomiting and diarrhea and  severe back pain.  I reviewed the discharge summary.  CT lumbar spine showed a stable severe chronic L1 pathologic compression fracture.  Stable mild compression deformity of superior endplate of L3 and superior endplate of L4.  The upper margin of the L4 fracture extends slightly into the spinal canal without high-grade canal stenosis and this is also stable.  Nondisplaced fractures coursing through the L3 pedicles bilaterally were seen which were not clearly seen previously this  could be acute and likely pathologic.  No extraspinal soft tissue abnormality.  Neurosurgery recommended conservative management.  Her pain was controlled and she was discharged home as she felt better with this.      3/31/2022-PET/CT shows progressive bone meta stasis with progressive FDG uptake in the following lesions. Left iliac crest lesion with max SUV of 7.2, previously 3.5. Right mid iliac crest lytic lesion shows maximum SUV of 7.8, previously 6.9.  T10 vertebral body mixed lytic and sclerotic lesion with an SUV of 7.1, previously not hypermetabolic. Thoracic vertebral bodies 8, 7, 5, and 4 also show hypermetabolic lesions were previously there was no hypermetabolism.  Some of the sclerotic osseous lesions are unchanged and do not show increased hypermetabolism compatible treated lesion such as a severe compression deformity at L1 as well as superior compression deformity at L4.  Medial right clavicle sclerotic lesion with SUV max of 4.8, previously not hypermetabolic. There is also right-sided sternal lesions.      4/14/2022--C#1- switch to single agent Doxil 50 mg per metered squared every 4 weeks.    5/13/2022-cycle #2- Doxil- dose reduced to 40 mg per metered square due to severe nausea/vomiting and migraines requiring IV fluids and IV antiemetics.    6/10/2022-cycle #3-Doxil 40 mg per metered square    7/1/2022- PET/CT shows resolution of the hypermetabolic skeletal metastasis.    7/6/2022-cycle #4-Doxil 40 mg per metered square  8/5/2022- cycle #5-Doxil  9/1/2022.  Cycle #6-Doxil  9/30/2022- cycle #7-Doxil    10/18/2022.  PET/CT does not show evidence of any FDG activity    11/2/2022-cycle #8-Doxil.  11/30/2022-cycle #9-Doxil.  12/28/2022-cycle #10-Doxil    2/3/2023- C#11- Doxil (delayed by 1 week because neutrophil count was 0.9)-wanted to give her Onpro but at that time it was not approved by insurance.    3/3/2023.  Cycle #12 Doxil.  Given with Onpro.    Repeat PET/CT on 3/27/2023 overall showed  very stable findings.  PET/CT showed focal FDG avidity in the heart. This is in an expected location of AV node or mitral valve.  This is nonspecific.  We checked an echocardiogram on 4/20/2023 which was unremarkable.  She is asymptomatic.  Continue to monitor clinically.      3/31/2023.  Cycle #13 Doxil.  Given with Onpro.     4/12/2023-she presented to ED with increased migraine headaches and nausea and vomiting.  She was treated symptomatically and was discharged home after she started to feel better.      4/28/2023.  Cycle #14 Doxil.  Given with Onpro    Cycle #16 Doxil with Onpro 6/23/2023    Cycle #17 Doxil with Onpro 7/21/2023      CA 15-3 has been slowly rising.    Repeat PET/CT on 8/8/2023 showed pretty stable findings.    She has been having more pain in the lumbar spine/low back.    8/18/2023.  Cycle #18 Doxil with Onpro    9/15/2023.  Cycle #19 Doxil with Onpro.    10/16/2023.  Cycle #20 Doxil with Onpro.    11/10/2023.  PET/CT overall showed pretty stable findings.    11/15/2023.  Cycle #21 Doxil with Onpro    12/13/2023.  Cycle #22 Doxil with Onpro is planned      Interval history.  This is a video visit.  Overall she feels about the same as before.  Back pain is fairly well controlled with buprenorphine.  She has not required as needed morphine.  She tolerated last chemotherapy well.  Denies any significant nausea vomiting diarrhea constipation.  Migraines are much better now.         ECOG 1    ROS:  Rest of the comprehensive review of the system was negative.        I reviewed other history in epic as below.       PAST MEDICAL HISTORY:     1.  Breast cancer  2.  Multiple sclerosis.   3.  Depression.   4.  Migraines.   5.  Hypertension.   6.  Cholecystectomy and umbilical hernia repair.     SOCIAL HISTORY: She smoked for 5 years but quit many years ago.  Drinks alcohol socially.  She lives with her .  She is a teacher in middle school.       FAMILY HISTORY: She mentions that her mother had  "breast cancer at 49.  Both grandmothers had breast cancer but she is not sure of the age.  A couple of cousins have cancers.  Patient has 3 children who are healthy.    Current Outpatient Medications   Medication    apixaban ANTICOAGULANT (ELIQUIS ANTICOAGULANT) 5 MG tablet    Buprenorphine HCl (BELBUCA) 450 MCG FILM buccal film    busPIRone (BUSPAR) 15 MG tablet    calcium citrate-vitamin D (CITRACAL) 315-250 MG-UNIT TABS    Cholecalciferol (VITAMIN D3 PO)    galcanezumab-gnlm (EMGALITY) 120 MG/ML injection    LORazepam (ATIVAN) 0.5 MG tablet    metoclopramide (REGLAN) 5 MG tablet    morphine (MSIR) 15 MG IR tablet    OLANZapine (ZYPREXA) 5 MG tablet    ondansetron (ZOFRAN ODT) 4 MG ODT tab    propranolol ER (INDERAL LA) 80 MG 24 hr capsule    propranolol ER (INDERAL LA) 80 MG 24 hr capsule    traZODone (DESYREL) 50 MG tablet    ubrogepant (UBRELVY) 100 MG tablet    venlafaxine (EFFEXOR XR) 75 MG 24 hr capsule    vitamin B-2 (RIBOFLAVIN) 25 MG TABS tablet    ZOLMitriptan (ZOMIG) 5 MG nasal spray     No current facility-administered medications for this visit.     Facility-Administered Medications Ordered in Other Visits   Medication    heparin 100 unit/mL injection 5 mL    sodium chloride (PF) 0.9% PF flush 10 mL        Allergies   Allergen Reactions    Bactrim [Sulfamethoxazole W/Trimethoprim]      Internal bleeding    Copaxone [Glatiramer] Hives          PHYSICAL EXAMINATION:     Ht 1.575 m (5' 2\")   Wt 53.5 kg (118 lb)   BMI 21.58 kg/m    Wt Readings from Last 4 Encounters:   12/05/23 53.5 kg (118 lb)   11/15/23 57.2 kg (126 lb)   10/25/23 56.7 kg (125 lb)   10/16/23 58.4 kg (128 lb 12.8 oz)         Constitutional.  Looks well and in no apparent distress.   Eyes.  Without eye redness or apparent jaundice.   Respiratory.  Non labored breathing. Speaking in full sentences.    Skin.  No concerning skin rashes on the skin visualized.   Neurological.  Is alert and oriented.  Psychiatric.  Mood and affect seem " appropriate.      The rest of a comprehensive physical examination is deferred due to [facility] video visit restrictions.          Labs and Imaging.    Reviewed.    11/15/2023   CBC shows WBC 4.1.  Hemoglobin 11.1.  Platelets 161.  CMP shows calcium 8.7.  Albumin 3.4.    CA 15-3 was 287 on 11/15/2023    CA 15-3 was 237 on 10/16/2023    CA 15-3 was 242 on 9/15/2023    CA 15-3 was 193 on 8/18/2023 7/21/2023.    CA 15-3 was 186.    7/3/2023    CA 15-3 was 177 on 6/23/2023      CA 15-3 was 129 on 3/31/2023      CA 15-3 was 123 on 3/3/2023.      11/30/2022     CA 15-3 was 135.    9/30/2022  CA 15-3 was 159.  CA 27-29 was 1992 on 6/10/2022  CA 27-29 was 3370 on 5/13/2022.  It was 5307 on 4/14/2022 ferritin Doxil was started.      9/28/2021.      Iron studies, ferritin is 101, iron 51, TIBC 268 and iron saturation index 19%.        PET/CT on 11/10/2023  COMPARISON: PET 8/8/2023        FINDINGS:      HEAD/NECK:  There is no suspicious FDG uptake in the neck.     The paranasal sinuses are clear. The mastoid air cells are clear.      No hypermetabolic lymph nodes are demonstrated in the neck.      The mucosal and deep spaces of the neck are unremarkable. The major  salivary glands are unremarkable. The thyroid is unremarkable. The  major vasculature of the neck are patent.     CHEST:  Bilateral mastectomies with breast implants.  There is no suspicious FDG uptake in the chest.     The central tracheobronchial tree is clear. No pleural effusion or  pneumothorax. No acute consolidation.      No hypermetabolic lymph nodes are demonstrated in the chest.     Heart size is within normal limits. No pericardial effusion. The  thoracic aorta and main pulmonary artery are within normal limits for  diameter. The esophagus is unremarkable.      ABDOMEN AND PELVIS:  There is no suspicious FDG uptake in the abdomen or pelvis.     The liver is unremarkable. Cholecystectomy clips. No intrahepatic or  extrahepatic biliary  ductal dilatation. The pancreas is unremarkable.  No pancreatic ductal dilatation. The spleen is unremarkable. The  adrenal glands are unremarkable.     Symmetric enhancement of the kidneys. No hydronephrosis. The urinary  bladder is unremarkable. Uterus is unremarkable. Ovaries are absent.  No hypermetabolic lymph nodes are demonstrated in the abdomen or  pelvis.     Normal caliber of the small and large bowel. No free air, free fluid,  or fluid collection. Normal caliber of the abdominal aorta.     LOWER EXTREMITIES:   There is no suspicious FDG uptake in the visualized lower extremities.     BONES: Again demonstrated are numerous sclerotic and lytic lesions  some of which may remains mildly hypermetabolic. FDG uptake in bone  metastases are not substantially changed, with a few lesions showing  mild FDG increases, others decreased, and others not significantly  changed. Examples include:     Decreased FDG:    T8 vertebral body max SUV 3.1 (prior 4.3).  T9 left posterior lytic lesion max SUV 3.1 (prior 3.4)     Increased FDG:   A left ninth rib costovertebral chondral junction max SUV 3 (prior  2.6.)  Right sixth rib anteriorly max SUV 3.8 prior 2.7.     Not significantly changed:  Right second rib anterolateral focus of hypermetabolism max SUV 3.9,  not significantly changed from prior.   Right iliac bone max SUV 3.6 (prior 3.9).     Multiple old right rib fractures. Left 10th rib the deformity with  callus. A severe compression deformity of L1 unchanged.                                                                      IMPRESSION: In this patient with history of breast cancer:  Overall  stable disease.  Again demonstrated are numerous metastatic bone lesions, some of which  have mildly elevated FDG activity. Some have increased slightly,  others have decreased slightly,  others are not significantly changed.      CT lumbar spine on 8/28/2023  IMPRESSION:  1.  No evidence for acute displaced fracture.  Fractures of the L1, L3 and L4 vertebral bodies appear stable from 07/31/2023.  2.  No evidence of traumatic subluxation.  3.  Diffuse osseous metastatic disease.    MRI of the lumbar spine on 7/31/2023  COMPARISON: PET/CT 03/27/2023.  TECHNIQUE: MRI Lumbar Spine without IV contrast.     FINDINGS: Examination is mildly limited by patient motion artifact.     Alignment is unchanged. Redemonstrated heterogeneous bone marrow signal throughout the visualized lumbosacral spine including numerous focal abnormal T1 hypointense and STIR hyperintense marrow replacing lesions, including confluent lesions at T12 and S2   vertebrae. Similar chronic L1 pathologic burst fracture with mild superior endplate retropulsion and severe anterior-central vertebral body height loss. Likewise, similar chronic L4 superior endplate fracture with associated mild retropulsion. Large   Schmorl's node at the L2 inferior endplate, as before. Vertical defects in the bilateral L3 pedicles are redemonstrated compatible with chronic pathologic fractures. Asymmetric STIR hyperintense signal abnormality in the left sacral ala partially   visualized, which may represent additional confluent pathologic marrow replacement. Otherwise, remaining lumbar vertebral body heights are maintained. Conus signal is normal and terminates at L2. No perivertebral soft tissue edema. There is mild edema   within the bilateral multifidus and erector spinae muscles, which may indicate posttraumatic injury/strain. Superficial subcutaneous soft tissue edema may be posttraumatic or related to positioning. Prominence of bile duct may be related to   postcholecystectomy physiologic ectasia. Small/subcentimeter right renal cyst. Last fully formed disc is designated L5-S1. Multilevel disc degeneration and height loss, severe at L5-S1.     T12-L1: No disc without bulge or protrusion. Facet joints are normal. Mild L1 superior endplate retropulsion indents the ventral thecal sac  without spinal canal stenosis. No foraminal stenosis.     L1-L2: No disc without bulge or protrusion. Facet joints are normal. No spinal canal or foraminal stenosis.     L2-L3: Bilobed left asymmetric diffuse disc bulge. Mild facet arthropathy with ligamentum flavum thickening. Mild left foraminal stenosis. No right foraminal or spinal canal stenosis.     L3-L4: Bilobed left asymmetric diffuse disc bulge. Mild facet arthropathy with ligamentum flavum thickening. In conjunction with superior endplate retropulsion, there is borderline mild central spinal canal stenosis and mild left subarticular recess   narrowing. Mild right and moderate left foraminal stenosis.     L4-L5: Small left foraminal disc protrusion. Moderate facet arthropathy. No spinal canal or foraminal stenosis.     L5-S1: No disc without bulge or protrusion. Facet joints are normal. No spinal canal or foraminal stenosis.                                                                      IMPRESSION:  1.  Extensive osseous metastases as well as chronic pathologic fractures at L1, L3, and L4, as detailed.  2.  Partially visualized asymmetric signal abnormality in the left sacral ala may represent confluent pathologic marrow replacement. Marrow edema related to sacral fracture is difficult to exclude in the appropriate clinical context.  3.  Posterior paraspinous muscular edema can be compatible with posttraumatic injury/strain.  4.  Multilevel degenerative changes, as detailed.        7/6/2023.  MRI brain  IMPRESSION:  1.  No acute ischemia, mass/mass effect, or abnormal intracranial enhancement.   2.  Similar sequela from demyelinating disease.  3.  Osseous metastases, as before.      Echocardiogram 4/20/2023  Left ventricular size, wall motion and function are normal. The ejection  fraction is 60-65%.  Global right ventricular function is normal.  No hemodynamically significant valve abnormalities.  The inferior vena cava is normal.  No pericardial  effusion.     This study was compared with the study from 4/7/22. No significant change.      3/27/2023.  PET/CT  1. No new suspicious FDG uptake within the skeleton.  2. Stable numerous non-FDG avid lytic and sclerotic osseous lesions  throughout the axial skeleton.  3. Stable nondisplaced fracture of the left acromion/glenoid, chronic  bilateral healing rib fractures, and compression deformities of the  thoracic and lumbar spine.  4. Hypermetabolic focus in the expected location of the AV node versus  mitral valve, this may represent a normal AV node versus mitral valve  pathology such as vegetations. Recommend follow-up with cardiac  Ultrasound.        11/30/2022-x-ray of the ribs of the left side  There are a few sclerotic lesion seen in the right anterior ribs that were seen on previous PET CTs. These appear to involve the fourth and fifth ribs. There are likely more subtle lesions that were   described on the PET CT scan. Irregularity of the distal end of the left ninth rib may be from metastatic disease or fracture on the oblique view which likely is chronic.       10/18/2022-PET/CT showed no new suspicious FDG uptake within the skeleton.  Stable abnormal FDG avid lytic and sclerotic osseous lesions.  Mild diffuse FDG uptake throughout the large bowel without CT abnormality.    7/1/2022.  PET/CT shows diffuse sclerotic and lytic osseous lesions without any abnormal FDG uptake in the skeleton, consistent with treated disease.  There is evaluation of previous hypermetabolic bone lesions.  Multiple chronic rib fracture deformities and stable compression fracture deformities of T6, L1 and L4.  No suspicious FDG uptake in the chest abdomen or pelvis.    3/31/2022-PET/CT shows progressive bone metastasis with progressive FDG uptake in the following lesions. Left iliac crest lesion with max SUV of 7.2, previously 3.5. Right mid iliac crest lytic lesion shows maximum SUV of 7.8, previously 6.9.  T10 vertebral body  mixed lytic and sclerotic lesion with an SUV of 7.1, previously not hypermetabolic. Thoracic vertebral bodies 8, 7, 5, and 4 also show hypermetabolic lesions were previously there was no hypermetabolism.  Some of the sclerotic osseous lesions are unchanged and do not show increased hypermetabolism compatible treated lesion such as a severe compression deformity at L1 as well as superior compression deformity at L4.  Medial right clavicle sclerotic lesion with SUV max of 4.8, previously not hypermetabolic. There is also right-sided sternal lesions.      Biopsy from the right acetabulum shows metastatic lobular carcinoma.  It is triple negative.  Androgen receptor was diffusely positive (90%, moderate intensity) by immunohistochemistry. )  PD-L1 negative.    Foundation one showed CDH1 mutation (initially lobular cancer), CCND1 amplification ( equivocal ). FGF3, FGF4, FGF19 amplification ( Equivocal ). Most promising is CCND1 amplification with abemaciclib showing response in 4/10 patients. FGF3,FGF4 and FGF19 are targetable with pan-FGFR inhibitors.           ASSESSMENT AND PLAN:     Metastatic breast cancer negative breast cancer (androgen receptor positive ) with extensive involvement of the bones.  There is also a left lower lobe hypermetabolic lesion and mediastinal lymph nodes which are hypermetabolic.  The biopsy from the right iliac bone is consistent with metastatic lobular breast cancer but it is hormone receptor and HER-2 negative and PDL 1 is also negative.    Foundation 1 testing did not reveal any actionable mutations.    She was intolerant to Xeloda and progressed on Taxol and eribulin.      We then switched to recently FDA approved sacituzumab govitecan-hziy (Trodelvy) for TNBC, based on the results of the phase II IMMU-132-01 clinical trial.   She started this on 11/11/2020.      She obtained a second opinion from Dr. Castillo from [facility] who recommended a repeat biopsy to be done to  make sure that she really has triple negative breast cancer.      She also met with Dr. Arelis Arshad on 3/18/2021.      After 10 cycles of Trodelvy, she had PET/CT on 6/11/2021 which showed mildly increased uptake in some of the bone lesions while stability in other bone lesions.        After 15 cycles, repeat PET scan showed stable findings.  CA 27-29 has been increasing and it is 1176.      As she was tolerating the chemotherapy well and her quality of life has been decent and scans were stable although she had a rising CA 27-29, we decided on continuing the same chemotherapy because it has been a very slow progression of the disease.    Then she had clear progression of the disease in the bones with increased FDG uptake in both right and left iliac bones and the sternum.    Foundation one showed CDH1 mutation (initially lobular cancer), CCND1 amplification ( equivocal ). FGF3, FGF4, FGF19 amplification ( Equivocal ). Most promising is CCND1 amplification with abemaciclib showing response in 4/10 patients. FGF3,FGF4 and FGF19 are targetable with pan-FGFR inhibitor    As the tumor is diffusely positive for androgen receptor, we decided to start her on androgen receptor blockers.    I initially recommended enzalutamide 160 mg daily ( Enzalutamide for the Treatment of Androgen Receptor-Expressing Triple-Negative Breast Cancer----J Clin Oncol. 2018 Mar 20; 36(9): 884-890. ).    Eventually we decided to start her on Casodex 150 mg daily instead of Enzalutamide due to cost issues.  Clinical Trial Clin Cancer Res. 2013 Oct 1;19(19):5505-12.   Phase II trial of bicalutamide in patients with androgen receptor-positive, estrogen receptor-negative metastatic Breast Cancer  She started Casodex on 1/6/2022.    She had progressive disease in the bones on the PET scan on 3/31/2022.    We changed to single agent Doxil on 4/14/2022.      She developed significant nausea vomiting and headache so cycle #2 was given on 5/13/2022 with  20% dose reduction which she tolerated well.    Cycle #3 was given on 6/10/2022 with the same dose reduced Doxil.    Repeat PET/CT on 7/1/2022 shows resolution of the FDG avid bony metastasis consistent with treated disease.  No suspicious FDG uptake was seen in the chest abdomen and pelvis.  CA 27-29 was also coming down.    CA 15-3 has been slowly rising.    Repeat PET/CT on 8/8/2023 showed pretty stable findings.    She has been having more pain in the lumbar spine/low back.    Otherwise tolerating chemotherapy well.  As the progression has been very slow and no mild and overall she has been tolerating chemotherapy well, we decided to continue the same chemotherapy.    Over time CA 15-3 has continued to slowly increase but the PET scan on 11/10/2023 overall showed very stable findings.    We decided to continue Doxil for now.    She is tolerating this well.  Plan to proceed with cycle #22 on 12/13/2023.  We will continue to follow CA 15-3 serially.      In the future we can potentially consider another chemotherapy like carboplatin or gemcitabine.    Chemotherapy-induced neutropenia.    Continue Onpro support.      Anemia.  Anemia is mild and from chemotherapy.  Continue to monitor       Bone disease.  She has metastatic disease to the bone.  Previously she got palliative radiation to T12-L5 3000 cGy in 10 fractions from 9/6/2019 till 9/18/2019.  She was evaluated by orthopedics Dr. Bipin Elliott on 11/1/2019 and conservative management was recommended.    She was on Zometa every 3 months. She last received on 9/29/2020.  Because of progression of the disease in the bones, we switched to Xgeva which she received on 11/11/2020.    She had progression of the disease in the bones so we switched to Doxil but we continued xgeva.  PET scan on 7/1/2022 does not show any suspicious FDG uptake in the bones consistent with treated disease.  Repeat PET/CT in March 2023 was without any active FDG avid lesions.  Overall  PET scan from 11/10/2023 remains pretty stable with slight decrease in FDG avidity of some lesions was a slight increase in some but overall showing pretty stable findings.  We will continue vitamin D and calcium as well as monthly Xgeva.        Cancer related pain.  Following with palliative care.  Recently buprenorphine buccal film dose was increased which has helped.  She was also seen by Dr. Yusuf from pain clinic and she got bilateral SI joint steroid injections on 10/13/2023 which did not help much.  She is following with Dr. Whitaker and pain is now under fair control.  She has to take as needed morphine very occasionally.    She is being enrolled in medical cannabis program.      Migraines.  Doing much better with Emgality.  She takes as needed Ubrelvy which helps.  She is following with neurology.      Bilateral pulmonary emboli.  Continue Eliquis.        We did not address the following today.      Shingles.  The rash has dried out.  She completed well with acyclovir and prednisone.  She takes gabapentin for postherpetic neuralgia and is doing better.      Neuropathy.  This remains mild and stable with neuropathy in her hands.    This is in addition to the chronic balance issues and heat and cold sensitivity from underlying multiple sclerosis which is also stable for years.  Continue to monitor.     Subcutaneous nodule in the scalp.  This looks like an epidermoid cyst.  She thinks it is a stable.  Continue to monitor.       Insomnia.  Continue trazodone.      Wall thickening of esophagus and FDG uptake of fundus of the stomach.  It could be in the setting of inflammation from recent nausea and vomiting.  Symptoms have resolved.  Continue to monitor clinically.    Vision changes.    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  It was thought that this could be due to Taxol as patient reported to the ophthalmologist that she was on Taxol in September 2019 when her  symptoms started.  But she was not on Taxol in September 2019 when she started noticing the visual changes so Taxol is not responsible for this.  The first dose of Taxol was on 11/5/2019.   She had left cataract extraction on 2/8/2021 and she has noticed significant improvement in her vision.  Thinks that her vision is back to her usual.      Increased FDG ability in the colon.  She had FDG uptake in the cecum and ascending colon but no corresponding lesion was seen on the CT scan.  She is completely asymptomatic so at this time we will continue to observe.    Discussion regarding healthcare directives.  On previous visit she told me that she will bring the health care directive for our records but she forgot so I told her to bring it on next visit.      Breast implant removal.  She tells me that she was planning to do breast implant removal because there was a recall on this.  Her surgery was scheduled for 9/24/2019.  Because of this new development of metastatic breast cancer and her recent radiation, surgery has been canceled.  We discussed that at this time treatment for metastatic breast cancer would take precedence over the other surgery as the other surgery would require her to be off chemotherapy for several weeks and in that case the chances of cancer progression would be high and I would not recommend that.    Multiple sclerosis.  She will continue to follow with her neurologist Dr. Quiles.  Currently multiple sclerosis is under control and currently she is not taking any medications.    Return to clinic as a scheduled    All of her questions were answered to her satisfaction.  She is agreeable and comfortable with the plan.    Dewayne Zepeda MD

## 2023-12-05 NOTE — PROGRESS NOTES
Virtual Visit Details    Type of service:  Video Visit     Originating Location (pt. Location): Home    Distant Location (provider location):  Off-site  Platform used for Video Visit: [employer]  Video start time. 10:33 AM  Video stop time. 10:36 AM

## 2023-12-05 NOTE — LETTER
12/5/2023         RE: Shaneka Alvarez  [id number] [facility] [id number]        Dear Colleague,    Thank you for referring your patient, Shaneka Alvarez, to the [facility]. Please see a copy of my visit note below.    Virtual Visit Details    Type of service:  Video Visit     Originating Location (pt. Location): Home    Distant Location (provider location):  Off-site  Platform used for Video Visit: Gertrude  Video start time. 10:33 AM  Video stop time. 10:36 AM     ONCOLOGY FOLLOW UP NOTE: 12/05/23        I took the history from reviewing the previous notes that she was following with Dr. Amaya.  I have copied and updated from prior notes.    ONCOLOGY History:    1.  January 2006:  Diagnosed with Stage IIB, T2 N1 M0 invasive lobular carcinoma of the right breast.  Final pathology showed a 4.5 x 3.8 x 2.5 cm, 01/14 lymph nodes positive.  Estrogen, progesterone receptor positive, HER-2 negative.     2.  Genetics.  BRCA1 and 2 mutations not detected. 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       THERAPY TO DATE:   1.  2006:  ECOG 2104 protocol of dose-dense Adriamycin, Cytoxan and Avastin x4 cycles followed by Taxol and Avastin x4 cycles.   2.  03/2007:  Completed 1 year Avastin.   3.  11/2006-01/2007:  Radiotherapy to the right breast of 5040 cGy.   4.  03/20072824-1222:  Aromasin.  Stopped after moving to the [facility].   5.  01/2016:  Right modified radical mastectomy with latissimus dorsi flap reconstruction and a left prophylactic mastectomy with latissimus dorsi flap reconstruction.     She recently had MRI of the brain as a follow-up for multiple sclerosis and there were multiple calvarial lesions noted which was suspicious for metastatic disease.  There was no evidence of new multiple sclerosis lesions.  She had a PET scan on 8/30/2019 which showed widespread bone metastatic lesions (calvarium, spine, sacrum, pelvis, ribs most prominent at C7, T3, L1 and L4),  hypermetabolic 3 cm lesion in LLL, and mediastinal/hilar LN. CEA wnl at 1.9 and C27-29 elevated to 415 (28 on 8/23/16). A CT guided biopsy of the right iliac bone was obtained on 9/4/19, pathology consistent with metastatic adenocarcinoma (breast), ER/HI negative, HER-2 negative PDL 1 < 1%. A second biopsy was take of the LLL nodule via EBUS on 9/5/19 did not show any malignancy.    C/T/L spine MRI 8/3/19 to 9/2/19 with numerous enhancing lesions of the C, T and L spine, largest around T9 and L1. No evidence of cord compression. Has a L1 compression fracture and impending L4.     Received radiation T12-L5 3000 cGy in 10 fractions from 9/6/2019 till 9/18/2019.  Neurosurgery recommended no surgical intervention but to wear Gorge brace when out of bed and HOB >30 degrees    She started palliative Xeloda 2000/1500 on 10/1/2019 but after just a few doses she noticed nausea, red eyes blurred vision, loss of appetite.  She stopped taking it after 5 doses and was seen by nurse practitioner on 10/7/2019 when she was improving.  At that point she was restarted on Xeloda at a lower dose of 1000 mg twice a day.  As she was not able to tolerate even the lower dose with extreme nausea and vomiting and feeling extremely fatigued and blurry vision, we decided on stopping Xeloda.    We decided on repeating scans and MRI brain on 10/25/2019 showed no intracranial metastatic disease.   Multiple enhancing calvarial lesions may be increased in number and are suggestive of metastatic disease. Thinner imaging on the current study may identify the smaller lesions and may be responsible for  identified more lesions.   There is a single focus of T2 hyperintense signal within the anterior right temporal lobe may represent interval demyelination. There is no evidence for active demyelination.    PET/CT on 11/1/2019 showed favorable response to therapy and Overall FDG uptake within scattered osseous metastases is decreased since 8/30/2019,  particularly within the pelvis. Increased sclerotic appearance of L1 and L4 pathologic compression deformities, likely sequela of recently completed palliative radiation therapy.    No significant FDG uptake in previously demonstrated hypermetabolic mediastinal lymph nodes. Biopsy negative on 9/5/2019.  There is also decreased FDG uptake in the left lower lobe (biopsy negative for  malignancy), now with dense consolidation containing air bronchograms suggestive of infection versus aspiration.  There is diffuse FDG uptake within the esophagus, consistent with esophagitis.    Because of intolerance to Xeloda we decided on switching to weekly paclitaxel on 11/5/2019.    C#2 Taxol 12/4/19- started with 70mg/m2 dose reduction    C#3 Taxol 12/30/2019     PET/CT on 1/24/2020 showed progression of the disease in some of the bone lesions including increase in size and lytic lesion within the vertebral body of C4 measuring 1 cm as opposed to 0.6 cm previously and a new central soft tissue nodule with SUV max 4.1 when previously it was non-hypermetabolic.  There is also some progression noted in the right proximal femur and right iliac bone.  There is decreased metabolic uptake in the right lamina of T9 with SUV max 4.7 when previously it was 8.0.  Other lesions are stable.    CA 27-29 also had increased significantly and it was 257 on 1/28/2020.    We decided on switching to eribulin on 1/28/2020.    C#2 2/18/2020  C#2 D#8 2/25/2020    C#3 D#1 3/18/2020 -delayed by 1 week as per patient's preference because she wanted to visit her family.    She had a repeat PET/CT on 3/27/2020 which showed stable size of the bone metastatic lesions although the FDG avidity was less, consistent with treatment response.    She had MRI of the thoracic spine on 4/3/2020 and it was compared to the one which was done in August 2019 and it showed increased size of several metastatic lesions most notably at T9 but also at T5-T7.  Other lesions at  T10, T11 and T12 appeared improved.    CA 27-29 was also down to 222 on 3/18/2020.    Cycle #4 eribulin ( dose reduced to 1.2 mg/m2 )-4/7/2020-   Cycle #5 Eribulin ( 1.2 mg/m2 )- 4/28/2020   Cycle #6 Eribulin ( 1.2 mg/m2 )- 5/19/2020     Cycle #7 Eribulin ( 1.2 mg/m2 )- 6/9/2020    Cycle #8 Eribulin ( 1.2 mg/m2 )- 6/30/2020     She had a repeat PET scan on 7/17/2020 which showed incidental finding of new acute bilateral pulmonary embolism in the distal left main pulmonary artery extending in the left upper and lower lobes and in the segmental and branch arteries in the right lower lobe.    It also showed mildly increased FDG avidity in the lytic lesion in the T9 lamina and right pedicle with SUV max 5.3, previously 3.9.  That is also slightly increased FDG uptake to the left anterior fifth rib at the costochondral junction SUV max 3.9, previously 2.5.  There is slightly decreased FDG uptake in the right femoral neck lesion SUV max 2.2, previously 2.9.  FDG uptake in other bone lesions is fairly stable.  No new metastasis are seen.    CA 27-29 was 308 on 6/30/2020.  It was 286 on 5/19/2020.    Overall this is consistent with only slight progression versus stable disease.    She was started on Lovenox.    Cycle #9 eribulin 7/21/2020. CA 27-29 was 238    C#10 eribulin 8/18/2020. ( delayed by one week for ANC 0.9 )    C#11 Eribulin 9/8/2020    C#12 Eribulin 9/29/2020     C#13 Eribulin 10/20/2020     PET scan on 11/6/2020 shows progression of the disease with increased FDG uptake in the lytic lesions in the T9/T10 and right posterolateral elements as well as increased FDG uptake in the previously known hypermetabolic right iliac bone lesion suggesting recurrence of previously treated metastasis.  There is new subtle lesion in the right manubrium.  There is also a new fracture in the anterolateral left fourth rib with associated uptake and this could be a pathologic fracture.  Healing fracture in the left anterior fifth  rib lesion.  There is resolution of the left pulmonary emboli.  Decreased FDG uptake of the esophagus consistent with improving inflammation.    CA 27-29 on 10/20/2020 was also elevated at 489.    C#1 Trodelvy on 11/11/2020.  Cycle #2 Trodelvy 12/2/2020  Cycle number 2-day #8 Trodelvy 12/8/2020.    12/15/2020-12/16/2020.  She was admitted to the hospital with nausea vomiting and dehydration.  She had tachycardia and initial lactic acid of 5.  It decreased to 1.4 after 2 L of normal saline.  She also had hypokalemia and ANC was 1.3.  She was treated with IV fluids.  Procalcitonin was negative.  CTA of the chest was negative for pulmonary embolism or pneumonia.  No bacterial infection was documented.  Her medication which she was initially unable to tolerate at home, were restarted and she was discharged home once started to feel better.      We delayed the start of cycle number 3 x 1 week and decrease the dose of chemotherapy by 25%.  She also had neutropenia with ANC of 1.0.      She started cycle number 3-day #1 on 12/29/2020.  CA 27-29 was 392.    Cycle number 3-day #8 1/5/2021.    C#4 Trodelvy 1/20/2021- 75% dose    2/5/2021.  PET/CT overall shows a good response to treatment with improvement of previously hypermetabolic lesions in the spine and pelvis and stability of other bone meta stasis.  There is a single lytic lesion in the right iliac bone which demonstrates slight Yimi increased FDG uptake and has SUV max 4.7 while previously it was SUV max 3.4.  There was diffuse wall thickening of the esophagus with uptake in the esophagus in the fundus of the stomach and this could be seen with inflammation.    Trodelvy cycle #5 - 2/10/2021.  75% of the dose.  CA 27-29 was 337.    Trodelvy cycle #6 - 3/3/2021.  75% of the dose.  Trodelvy cycle #6, D#8 - 3/10/2021.  75% of the dose.    Trodelvy C#8, D#8 on 4/21/2021  CA 27-29 stable at 371 on 4/14/2021    Trodelvy, cycle #9- 5/5/2021        On 2/25/2020 when she had  biopsy of the right acetabulum and it was consistent with metastatic lobular carcinoma.  Again it was Triple Negative.     On 3/18/2020 when she also met with Dr. Arelis Arshad who also advised testing for androgen receptor studies on the biopsy specimen.  Tumor was diffusely androgen receptor positive.      5/26/2021-cycle #10 Trodelvy     CA 27-29 was 545.    6/11/2021.  PET/CT shows mildly increased uptake in several bone lesions for example in the left anterior iliac crest, mid right iliac crest and left ischium.  Uptake in other bone lesions are similar to previously.    Because of minimal progression of the disease and she is tolerating chemotherapy very well, we decided on continuing the same chemotherapy.    6/16/2021-Trodelvy cycle #11    7/7/2021 -Trodelvy cycle #12    9/14/2021-Trodelvy-cycle #15, day #8.    9/8/2021-CA 27-29 was more elevated and it was 1176.    PET/CT on 9/24/2021 showed stable findings with no evidence of FDG avid metastatic disease within the body.  Left axillary lymph nodes are prominent and hypermetabolic and likely from recent vaccinations in the left deltoid muscle.  Healed bony metastasis seems stable.      Cycle #16, Trodelvy 9/28/2021.  Cycle #17, day #8 Trodelvy was on 10/26/2021.  Cycle #18, day #8 Trodelvy on 11/22/2021       She saw Dr. Mejia whom on 10/6/2021.  She was not considered eligible for Enfortumab trial.    Cycle #19, Trodelvy on 12/7/2021 12/21/2021.  PET/CT showed progression of bony metastatic disease.  There is increase hypermetabolism in both the right and left iliac bones and the sternum.  Other bone lesions are stable.    There is new scattered areas of groundglass throughout both the lungs and these findings are indeterminate but may represent an infectious etiology.  Interval resolution of left axillary FDG avid lymphadenopathy.    She was started on Casodex 150 mg daily on 1/6/2022.    She was admitted to the hospital from 3/17/2022-3/19/2022 for  vomiting and diarrhea and  severe back pain.  I reviewed the discharge summary.  CT lumbar spine showed a stable severe chronic L1 pathologic compression fracture.  Stable mild compression deformity of superior endplate of L3 and superior endplate of L4.  The upper margin of the L4 fracture extends slightly into the spinal canal without high-grade canal stenosis and this is also stable.  Nondisplaced fractures coursing through the L3 pedicles bilaterally were seen which were not clearly seen previously this could be acute and likely pathologic.  No extraspinal soft tissue abnormality.  Neurosurgery recommended conservative management.  Her pain was controlled and she was discharged home as she felt better with this.      3/31/2022-PET/CT shows progressive bone meta stasis with progressive FDG uptake in the following lesions. Left iliac crest lesion with max SUV of 7.2, previously 3.5. Right mid iliac crest lytic lesion shows maximum SUV of 7.8, previously 6.9.  T10 vertebral body mixed lytic and sclerotic lesion with an SUV of 7.1, previously not hypermetabolic. Thoracic vertebral bodies 8, 7, 5, and 4 also show hypermetabolic lesions were previously there was no hypermetabolism.  Some of the sclerotic osseous lesions are unchanged and do not show increased hypermetabolism compatible treated lesion such as a severe compression deformity at L1 as well as superior compression deformity at L4.  Medial right clavicle sclerotic lesion with SUV max of 4.8, previously not hypermetabolic. There is also right-sided sternal lesions.      4/14/2022--C#1- switch to single agent Doxil 50 mg per metered squared every 4 weeks.    5/13/2022-cycle #2- Doxil- dose reduced to 40 mg per metered square due to severe nausea/vomiting and migraines requiring IV fluids and IV antiemetics.    6/10/2022-cycle #3-Doxil 40 mg per metered square    7/1/2022- PET/CT shows resolution of the hypermetabolic skeletal metastasis.    7/6/2022-cycle  #4-Doxil 40 mg per metered square  8/5/2022- cycle #5-Doxil  9/1/2022.  Cycle #6-Doxil  9/30/2022- cycle #7-Doxil    10/18/2022.  PET/CT does not show evidence of any FDG activity    11/2/2022-cycle #8-Doxil.  11/30/2022-cycle #9-Doxil.  12/28/2022-cycle #10-Doxil    2/3/2023- C#11- Doxil (delayed by 1 week because neutrophil count was 0.9)-wanted to give her Onpro but at that time it was not approved by insurance.    3/3/2023.  Cycle #12 Doxil.  Given with Onpro.    Repeat PET/CT on 3/27/2023 overall showed very stable findings.  PET/CT showed focal FDG avidity in the heart. This is in an expected location of AV node or mitral valve.  This is nonspecific.  We checked an echocardiogram on 4/20/2023 which was unremarkable.  She is asymptomatic.  Continue to monitor clinically.      3/31/2023.  Cycle #13 Doxil.  Given with Onpro.     4/12/2023-she presented to ED with increased migraine headaches and nausea and vomiting.  She was treated symptomatically and was discharged home after she started to feel better.      4/28/2023.  Cycle #14 Doxil.  Given with Onpro    Cycle #16 Doxil with Onpro 6/23/2023    Cycle #17 Doxil with Onpro 7/21/2023      CA 15-3 has been slowly rising.    Repeat PET/CT on 8/8/2023 showed pretty stable findings.    She has been having more pain in the lumbar spine/low back.    8/18/2023.  Cycle #18 Doxil with Onpro    9/15/2023.  Cycle #19 Doxil with Onpro.    10/16/2023.  Cycle #20 Doxil with Onpro.    11/10/2023.  PET/CT overall showed pretty stable findings.    11/15/2023.  Cycle #21 Doxil with Onpro    12/13/2023.  Cycle #22 Doxil with Onpro is planned      Interval history.  This is a video visit.  Overall she feels about the same as before.  Back pain is fairly well controlled with buprenorphine.  She has not required as needed morphine.  She tolerated last chemotherapy well.  Denies any significant nausea vomiting diarrhea constipation.  Migraines are much better now.         ECOG  1    ROS:  Rest of the comprehensive review of the system was negative.        I reviewed other history in epic as below.       PAST MEDICAL HISTORY:     1.  Breast cancer  2.  Multiple sclerosis.   3.  Depression.   4.  Migraines.   5.  Hypertension.   6.  Cholecystectomy and umbilical hernia repair.     SOCIAL HISTORY: She smoked for 5 years but quit many years ago.  Drinks alcohol socially.  She lives with her .  She is a teacher in middle school.       FAMILY HISTORY: She mentions that her mother had breast cancer at 49.  Both grandmothers had breast cancer but she is not sure of the age.  A couple of cousins have cancers.  Patient has 3 children who are healthy.    Current Outpatient Medications   Medication     apixaban ANTICOAGULANT (ELIQUIS ANTICOAGULANT) 5 MG tablet     Buprenorphine HCl (BELBUCA) 450 MCG FILM buccal film     busPIRone (BUSPAR) 15 MG tablet     calcium citrate-vitamin D (CITRACAL) 315-250 MG-UNIT TABS     Cholecalciferol (VITAMIN D3 PO)     galcanezumab-gnlm (EMGALITY) 120 MG/ML injection     LORazepam (ATIVAN) 0.5 MG tablet     metoclopramide (REGLAN) 5 MG tablet     morphine (MSIR) 15 MG IR tablet     OLANZapine (ZYPREXA) 5 MG tablet     ondansetron (ZOFRAN ODT) 4 MG ODT tab     propranolol ER (INDERAL LA) 80 MG 24 hr capsule     propranolol ER (INDERAL LA) 80 MG 24 hr capsule     traZODone (DESYREL) 50 MG tablet     ubrogepant (UBRELVY) 100 MG tablet     venlafaxine (EFFEXOR XR) 75 MG 24 hr capsule     vitamin B-2 (RIBOFLAVIN) 25 MG TABS tablet     ZOLMitriptan (ZOMIG) 5 MG nasal spray     No current facility-administered medications for this visit.     Facility-Administered Medications Ordered in Other Visits   Medication     heparin 100 unit/mL injection 5 mL     sodium chloride (PF) 0.9% PF flush 10 mL        Allergies   Allergen Reactions     Bactrim [Sulfamethoxazole W/Trimethoprim]      Internal bleeding     Copaxone [Glatiramer] Hives          PHYSICAL EXAMINATION:     Ht  "1.575 m (5' 2\")   Wt 53.5 kg (118 lb)   BMI 21.58 kg/m    Wt Readings from Last 4 Encounters:   12/05/23 53.5 kg (118 lb)   11/15/23 57.2 kg (126 lb)   10/25/23 56.7 kg (125 lb)   10/16/23 58.4 kg (128 lb 12.8 oz)         Constitutional.  Looks well and in no apparent distress.   Eyes.  Without eye redness or apparent jaundice.   Respiratory.  Non labored breathing. Speaking in full sentences.    Skin.  No concerning skin rashes on the skin visualized.   Neurological.  Is alert and oriented.  Psychiatric.  Mood and affect seem appropriate.      The rest of a comprehensive physical examination is deferred due to Public Health Emergency video visit restrictions.          Labs and Imaging.    Reviewed.    11/15/2023   CBC shows WBC 4.1.  Hemoglobin 11.1.  Platelets 161.  CMP shows calcium 8.7.  Albumin 3.4.    CA 15-3 was 287 on 11/15/2023    CA 15-3 was 237 on 10/16/2023    CA 15-3 was 242 on 9/15/2023    CA 15-3 was 193 on 8/18/2023 7/21/2023.    CA 15-3 was 186.    7/3/2023    CA 15-3 was 177 on 6/23/2023      CA 15-3 was 129 on 3/31/2023      CA 15-3 was 123 on 3/3/2023.      11/30/2022     CA 15-3 was 135.    9/30/2022  CA 15-3 was 159.  CA 27-29 was 1992 on 6/10/2022  CA 27-29 was 3370 on 5/13/2022.  It was 5307 on 4/14/2022 ferritin Doxil was started.      9/28/2021.      Iron studies, ferritin is 101, iron 51, TIBC 268 and iron saturation index 19%.        PET/CT on 11/10/2023  COMPARISON: PET 8/8/2023        FINDINGS:      HEAD/NECK:  There is no suspicious FDG uptake in the neck.     The paranasal sinuses are clear. The mastoid air cells are clear.      No hypermetabolic lymph nodes are demonstrated in the neck.      The mucosal and deep spaces of the neck are unremarkable. The major  salivary glands are unremarkable. The thyroid is unremarkable. The  major vasculature of the neck are patent.     CHEST:  Bilateral mastectomies with breast implants.  There is no suspicious FDG uptake in the chest.   "   The central tracheobronchial tree is clear. No pleural effusion or  pneumothorax. No acute consolidation.      No hypermetabolic lymph nodes are demonstrated in the chest.     Heart size is within normal limits. No pericardial effusion. The  thoracic aorta and main pulmonary artery are within normal limits for  diameter. The esophagus is unremarkable.      ABDOMEN AND PELVIS:  There is no suspicious FDG uptake in the abdomen or pelvis.     The liver is unremarkable. Cholecystectomy clips. No intrahepatic or  extrahepatic biliary ductal dilatation. The pancreas is unremarkable.  No pancreatic ductal dilatation. The spleen is unremarkable. The  adrenal glands are unremarkable.     Symmetric enhancement of the kidneys. No hydronephrosis. The urinary  bladder is unremarkable. Uterus is unremarkable. Ovaries are absent.  No hypermetabolic lymph nodes are demonstrated in the abdomen or  pelvis.     Normal caliber of the small and large bowel. No free air, free fluid,  or fluid collection. Normal caliber of the abdominal aorta.     LOWER EXTREMITIES:   There is no suspicious FDG uptake in the visualized lower extremities.     BONES: Again demonstrated are numerous sclerotic and lytic lesions  some of which may remains mildly hypermetabolic. FDG uptake in bone  metastases are not substantially changed, with a few lesions showing  mild FDG increases, others decreased, and others not significantly  changed. Examples include:     Decreased FDG:    T8 vertebral body max SUV 3.1 (prior 4.3).  T9 left posterior lytic lesion max SUV 3.1 (prior 3.4)     Increased FDG:   A left ninth rib costovertebral chondral junction max SUV 3 (prior  2.6.)  Right sixth rib anteriorly max SUV 3.8 prior 2.7.     Not significantly changed:  Right second rib anterolateral focus of hypermetabolism max SUV 3.9,  not significantly changed from prior.   Right iliac bone max SUV 3.6 (prior 3.9).     Multiple old right rib fractures. Left 10th rib the  deformity with  callus. A severe compression deformity of L1 unchanged.                                                                      IMPRESSION: In this patient with history of breast cancer:  Overall  stable disease.  Again demonstrated are numerous metastatic bone lesions, some of which  have mildly elevated FDG activity. Some have increased slightly,  others have decreased slightly,  others are not significantly changed.      CT lumbar spine on 8/28/2023  IMPRESSION:  1.  No evidence for acute displaced fracture. Fractures of the L1, L3 and L4 vertebral bodies appear stable from 07/31/2023.  2.  No evidence of traumatic subluxation.  3.  Diffuse osseous metastatic disease.    MRI of the lumbar spine on 7/31/2023  COMPARISON: PET/CT 03/27/2023.  TECHNIQUE: MRI Lumbar Spine without IV contrast.     FINDINGS: Examination is mildly limited by patient motion artifact.     Alignment is unchanged. Redemonstrated heterogeneous bone marrow signal throughout the visualized lumbosacral spine including numerous focal abnormal T1 hypointense and STIR hyperintense marrow replacing lesions, including confluent lesions at T12 and S2   vertebrae. Similar chronic L1 pathologic burst fracture with mild superior endplate retropulsion and severe anterior-central vertebral body height loss. Likewise, similar chronic L4 superior endplate fracture with associated mild retropulsion. Large   Schmorl's node at the L2 inferior endplate, as before. Vertical defects in the bilateral L3 pedicles are redemonstrated compatible with chronic pathologic fractures. Asymmetric STIR hyperintense signal abnormality in the left sacral ala partially   visualized, which may represent additional confluent pathologic marrow replacement. Otherwise, remaining lumbar vertebral body heights are maintained. Conus signal is normal and terminates at L2. No perivertebral soft tissue edema. There is mild edema   within the bilateral multifidus and erector  spinae muscles, which may indicate posttraumatic injury/strain. Superficial subcutaneous soft tissue edema may be posttraumatic or related to positioning. Prominence of bile duct may be related to   postcholecystectomy physiologic ectasia. Small/subcentimeter right renal cyst. Last fully formed disc is designated L5-S1. Multilevel disc degeneration and height loss, severe at L5-S1.     T12-L1: No disc without bulge or protrusion. Facet joints are normal. Mild L1 superior endplate retropulsion indents the ventral thecal sac without spinal canal stenosis. No foraminal stenosis.     L1-L2: No disc without bulge or protrusion. Facet joints are normal. No spinal canal or foraminal stenosis.     L2-L3: Bilobed left asymmetric diffuse disc bulge. Mild facet arthropathy with ligamentum flavum thickening. Mild left foraminal stenosis. No right foraminal or spinal canal stenosis.     L3-L4: Bilobed left asymmetric diffuse disc bulge. Mild facet arthropathy with ligamentum flavum thickening. In conjunction with superior endplate retropulsion, there is borderline mild central spinal canal stenosis and mild left subarticular recess   narrowing. Mild right and moderate left foraminal stenosis.     L4-L5: Small left foraminal disc protrusion. Moderate facet arthropathy. No spinal canal or foraminal stenosis.     L5-S1: No disc without bulge or protrusion. Facet joints are normal. No spinal canal or foraminal stenosis.                                                                      IMPRESSION:  1.  Extensive osseous metastases as well as chronic pathologic fractures at L1, L3, and L4, as detailed.  2.  Partially visualized asymmetric signal abnormality in the left sacral ala may represent confluent pathologic marrow replacement. Marrow edema related to sacral fracture is difficult to exclude in the appropriate clinical context.  3.  Posterior paraspinous muscular edema can be compatible with posttraumatic injury/strain.  4.   Multilevel degenerative changes, as detailed.        7/6/2023.  MRI brain  IMPRESSION:  1.  No acute ischemia, mass/mass effect, or abnormal intracranial enhancement.   2.  Similar sequela from demyelinating disease.  3.  Osseous metastases, as before.      Echocardiogram 4/20/2023  Left ventricular size, wall motion and function are normal. The ejection  fraction is 60-65%.  Global right ventricular function is normal.  No hemodynamically significant valve abnormalities.  The inferior vena cava is normal.  No pericardial effusion.     This study was compared with the study from 4/7/22. No significant change.      3/27/2023.  PET/CT  1. No new suspicious FDG uptake within the skeleton.  2. Stable numerous non-FDG avid lytic and sclerotic osseous lesions  throughout the axial skeleton.  3. Stable nondisplaced fracture of the left acromion/glenoid, chronic  bilateral healing rib fractures, and compression deformities of the  thoracic and lumbar spine.  4. Hypermetabolic focus in the expected location of the AV node versus  mitral valve, this may represent a normal AV node versus mitral valve  pathology such as vegetations. Recommend follow-up with cardiac  Ultrasound.        11/30/2022-x-ray of the ribs of the left side  There are a few sclerotic lesion seen in the right anterior ribs that were seen on previous PET CTs. These appear to involve the fourth and fifth ribs. There are likely more subtle lesions that were   described on the PET CT scan. Irregularity of the distal end of the left ninth rib may be from metastatic disease or fracture on the oblique view which likely is chronic.       10/18/2022-PET/CT showed no new suspicious FDG uptake within the skeleton.  Stable abnormal FDG avid lytic and sclerotic osseous lesions.  Mild diffuse FDG uptake throughout the large bowel without CT abnormality.    7/1/2022.  PET/CT shows diffuse sclerotic and lytic osseous lesions without any abnormal FDG uptake in the  skeleton, consistent with treated disease.  There is evaluation of previous hypermetabolic bone lesions.  Multiple chronic rib fracture deformities and stable compression fracture deformities of T6, L1 and L4.  No suspicious FDG uptake in the chest abdomen or pelvis.    3/31/2022-PET/CT shows progressive bone metastasis with progressive FDG uptake in the following lesions. Left iliac crest lesion with max SUV of 7.2, previously 3.5. Right mid iliac crest lytic lesion shows maximum SUV of 7.8, previously 6.9.  T10 vertebral body mixed lytic and sclerotic lesion with an SUV of 7.1, previously not hypermetabolic. Thoracic vertebral bodies 8, 7, 5, and 4 also show hypermetabolic lesions were previously there was no hypermetabolism.  Some of the sclerotic osseous lesions are unchanged and do not show increased hypermetabolism compatible treated lesion such as a severe compression deformity at L1 as well as superior compression deformity at L4.  Medial right clavicle sclerotic lesion with SUV max of 4.8, previously not hypermetabolic. There is also right-sided sternal lesions.      Biopsy from the right acetabulum shows metastatic lobular carcinoma.  It is triple negative.  Androgen receptor was diffusely positive (90%, moderate intensity) by immunohistochemistry. )  PD-L1 negative.    Foundation one showed CDH1 mutation (initially lobular cancer), CCND1 amplification ( equivocal ). FGF3, FGF4, FGF19 amplification ( Equivocal ). Most promising is CCND1 amplification with abemaciclib showing response in 4/10 patients. FGF3,FGF4 and FGF19 are targetable with pan-FGFR inhibitors.           ASSESSMENT AND PLAN:     Metastatic breast cancer negative breast cancer (androgen receptor positive ) with extensive involvement of the bones.  There is also a left lower lobe hypermetabolic lesion and mediastinal lymph nodes which are hypermetabolic.  The biopsy from the right iliac bone is consistent with metastatic lobular breast  cancer but it is hormone receptor and HER-2 negative and PDL 1 is also negative.    Foundation 1 testing did not reveal any actionable mutations.    She was intolerant to Xeloda and progressed on Taxol and eribulin.      We then switched to recently FDA approved sacituzumab govitecan-hziy (Trodelvy) for TNBC, based on the results of the phase II IMMU-132-01 clinical trial.   She started this on 11/11/2020.      She obtained a second opinion from Dr. Castillo from [facility] who recommended a repeat biopsy to be done to make sure that she really has triple negative breast cancer.      She also met with Dr. Arelis Arshad on 3/18/2021.      After 10 cycles of Trodelvy, she had PET/CT on 6/11/2021 which showed mildly increased uptake in some of the bone lesions while stability in other bone lesions.        After 15 cycles, repeat PET scan showed stable findings.  CA 27-29 has been increasing and it is 1176.      As she was tolerating the chemotherapy well and her quality of life has been decent and scans were stable although she had a rising CA 27-29, we decided on continuing the same chemotherapy because it has been a very slow progression of the disease.    Then she had clear progression of the disease in the bones with increased FDG uptake in both right and left iliac bones and the sternum.    Foundation one showed CDH1 mutation (initially lobular cancer), CCND1 amplification ( equivocal ). FGF3, FGF4, FGF19 amplification ( Equivocal ). Most promising is CCND1 amplification with abemaciclib showing response in 4/10 patients. FGF3,FGF4 and FGF19 are targetable with pan-FGFR inhibitor    As the tumor is diffusely positive for androgen receptor, we decided to start her on androgen receptor blockers.    I initially recommended enzalutamide 160 mg daily ( Enzalutamide for the Treatment of Androgen Receptor-Expressing Triple-Negative Breast Cancer----J Clin Oncol. 2018 Mar 20; 36(9): 884-890. ).    Eventually we  decided to start her on Casodex 150 mg daily instead of Enzalutamide due to cost issues.  Clinical Trial Clin Cancer Res. 2013 Oct 1;1919):5505-12.   Phase II trial of bicalutamide in patients with androgen receptor-positive, estrogen receptor-negative metastatic Breast Cancer  She started Casodex on 1/6/2022.    She had progressive disease in the bones on the PET scan on 3/31/2022.    We changed to single agent Doxil on 4/14/2022.      She developed significant nausea vomiting and headache so cycle #2 was given on 5/13/2022 with 20% dose reduction which she tolerated well.    Cycle #3 was given on 6/10/2022 with the same dose reduced Doxil.    Repeat PET/CT on 7/1/2022 shows resolution of the FDG avid bony metastasis consistent with treated disease.  No suspicious FDG uptake was seen in the chest abdomen and pelvis.  CA 27-29 was also coming down.    CA 15-3 has been slowly rising.    Repeat PET/CT on 8/8/2023 showed pretty stable findings.    She has been having more pain in the lumbar spine/low back.    Otherwise tolerating chemotherapy well.  As the progression has been very slow and no mild and overall she has been tolerating chemotherapy well, we decided to continue the same chemotherapy.    Over time CA 15-3 has continued to slowly increase but the PET scan on 11/10/2023 overall showed very stable findings.    We decided to continue Doxil for now.    She is tolerating this well.  Plan to proceed with cycle #22 on 12/13/2023.  We will continue to follow CA 15-3 serially.      In the future we can potentially consider another chemotherapy like carboplatin or gemcitabine.    Chemotherapy-induced neutropenia.    Continue Onpro support.      Anemia.  Anemia is mild and from chemotherapy.  Continue to monitor       Bone disease.  She has metastatic disease to the bone.  Previously she got palliative radiation to T12-L5 3000 cGy in 10 fractions from 9/6/2019 till 9/18/2019.  She was evaluated by orthopedics   Bipin Elliott on 11/1/2019 and conservative management was recommended.    She was on Zometa every 3 months. She last received on 9/29/2020.  Because of progression of the disease in the bones, we switched to Xgeva which she received on 11/11/2020.    She had progression of the disease in the bones so we switched to Doxil but we continued xgeva.  PET scan on 7/1/2022 does not show any suspicious FDG uptake in the bones consistent with treated disease.  Repeat PET/CT in March 2023 was without any active FDG avid lesions.  Overall PET scan from 11/10/2023 remains pretty stable with slight decrease in FDG avidity of some lesions was a slight increase in some but overall showing pretty stable findings.  We will continue vitamin D and calcium as well as monthly Xgeva.        Cancer related pain.  Following with palliative care.  Recently buprenorphine buccal film dose was increased which has helped.  She was also seen by Dr. Yusuf from pain clinic and she got bilateral SI joint steroid injections on 10/13/2023 which did not help much.  She is following with Dr. Whitaker and pain is now under fair control.  She has to take as needed morphine very occasionally.    She is being enrolled in medical cannabis program.      Migraines.  Doing much better with Emgality.  She takes as needed Ubrelvy which helps.  She is following with neurology.      Bilateral pulmonary emboli.  Continue Eliquis.        We did not address the following today.      Shingles.  The rash has dried out.  She completed well with acyclovir and prednisone.  She takes gabapentin for postherpetic neuralgia and is doing better.      Neuropathy.  This remains mild and stable with neuropathy in her hands.    This is in addition to the chronic balance issues and heat and cold sensitivity from underlying multiple sclerosis which is also stable for years.  Continue to monitor.     Subcutaneous nodule in the scalp.  This looks like an epidermoid cyst.  She  thinks it is a stable.  Continue to monitor.       Insomnia.  Continue trazodone.      Wall thickening of esophagus and FDG uptake of fundus of the stomach.  It could be in the setting of inflammation from recent nausea and vomiting.  Symptoms have resolved.  Continue to monitor clinically.    Vision changes.    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  It was thought that this could be due to Taxol as patient reported to the ophthalmologist that she was on Taxol in September 2019 when her symptoms started.  But she was not on Taxol in September 2019 when she started noticing the visual changes so Taxol is not responsible for this.  The first dose of Taxol was on 11/5/2019.   She had left cataract extraction on 2/8/2021 and she has noticed significant improvement in her vision.  Thinks that her vision is back to her usual.      Increased FDG ability in the colon.  She had FDG uptake in the cecum and ascending colon but no corresponding lesion was seen on the CT scan.  She is completely asymptomatic so at this time we will continue to observe.    Discussion regarding healthcare directives.  On previous visit she told me that she will bring the health care directive for our records but she forgot so I told her to bring it on next visit.      Breast implant removal.  She tells me that she was planning to do breast implant removal because there was a recall on this.  Her surgery was scheduled for 9/24/2019.  Because of this new development of metastatic breast cancer and her recent radiation, surgery has been canceled.  We discussed that at this time treatment for metastatic breast cancer would take precedence over the other surgery as the other surgery would require her to be off chemotherapy for several weeks and in that case the chances of cancer progression would be high and I would not recommend that.    Multiple sclerosis.  She will continue to follow with her neurologist   Etta.  Currently multiple sclerosis is under control and currently she is not taking any medications.    Return to clinic as a scheduled    All of her questions were answered to her satisfaction.  She is agreeable and comfortable with the plan.    Dewayne Zepeda MD          Again, thank you for allowing me to participate in the care of your patient.        Sincerely,        Dewayne Zepeda MD

## 2023-12-05 NOTE — NURSING NOTE
Patient reviewed medications and allergies in Mychart during e-check in and said everything looked correct.    Unable to complete ONC DISTRESS QRNS due to time.      Is the patient currently in the state of [place]? YES    Visit mode:VIDEO    If the visit is dropped, the patient can be reconnected by: VIDEO VISIT: Text to cell phone:   Telephone Information:   Mobile [phone]       Will anyone else be joining the visit? NO  (If patient encounters technical issues they should call [phone]    How would you like to obtain your AVS? MyChart    Are changes needed to the allergy or medication list? Pt stated no med changes    Reason for visit: ANA MILLERF

## 2023-12-06 NOTE — TELEPHONE ENCOUNTER
Received Notorioust message from patient requesting refill of Belbuca.     Last refill: 11/14/2023  Last office visit: 10/25/2023  Scheduled for follow up 1/3/2024     Will route request to MD for review.     Reviewed [place]  Report.

## 2023-12-13 NOTE — PROGRESS NOTES
Infusion Nursing Note:  Shaneka Alvarez presents today for C22D1 Doxil/IVF/On-Pro/Xgeva .    Patient seen by provider today: No   present during visit today: Not Applicable.    Note: patient has tolerable hand/foot redness & darkening.  She has had foot peeling several times, but not painful and does not interfere with ADLs.  Patient does have fatigue and rests as needed.      Intravenous Access:  Implanted Port.    Treatment Conditions:  Results reviewed, labs MET treatment parameters, ok to proceed with treatment.      Post Infusion Assessment:  Patient tolerated infusion without incident.  Patient tolerated injection without incident.       Discharge Plan:   RTC 1/9 for MD and 1/10 for next chemo.      PROSPER MCCRACKEN RN

## 2023-12-13 NOTE — PROGRESS NOTES
See IV flow sheet for details of port access. Labs sent: cbc, cmp, ca15-3. Port needle flushed per protocol and needle left in place for chemotherapy to follow.    Keira Brooks RN

## 2023-12-26 NOTE — TELEPHONE ENCOUNTER
Last office visit was 11/18/22 with SHREYAS INGRAM. RN called patient and she stated that her covid symptoms are getting better. No questions or concerns regarding covid at this time.     RN advised patient to make an appointment and we can refill up until scheduled appointment. Patient was very agreeable and again, denied any further questions or concerns.     Please review MyChart refills and refill Venlafaxine if appropriate.

## 2023-12-27 NOTE — TELEPHONE ENCOUNTER
Called patient and discussed message below, understanding verbalized. She will call scheduled lab only appointment and drop off specimen for testing.     Radha Hammond RN, BSN, PHN, OCN  [facility]    Dewayne Zepeda MD  You1 minute ago (1:18 PM)     AO  Yes please check UA/UC- ordering

## 2024-01-01 ENCOUNTER — VIRTUAL VISIT (OUTPATIENT)
Dept: ONCOLOGY | Facility: CLINIC | Age: 64
End: 2024-01-01
Attending: INTERNAL MEDICINE
Payer: COMMERCIAL

## 2024-01-01 ENCOUNTER — HEALTH MAINTENANCE LETTER (OUTPATIENT)
Age: 64
End: 2024-01-01

## 2024-01-01 ENCOUNTER — HOSPITAL ENCOUNTER (INPATIENT)
Facility: CLINIC | Age: 64
LOS: 2 days | Discharge: HOME | End: 2024-03-15
Attending: HOSPITALIST | Admitting: STUDENT IN AN ORGANIZED HEALTH CARE EDUCATION/TRAINING PROGRAM
Payer: COMMERCIAL

## 2024-01-01 ENCOUNTER — LAB (OUTPATIENT)
Dept: INFUSION THERAPY | Facility: CLINIC | Age: 64
End: 2024-01-01
Attending: INTERNAL MEDICINE
Payer: COMMERCIAL

## 2024-01-01 ENCOUNTER — VIRTUAL VISIT (OUTPATIENT)
Dept: FAMILY MEDICINE | Facility: OTHER | Age: 64
End: 2024-01-01
Payer: COMMERCIAL

## 2024-01-01 ENCOUNTER — VIRTUAL VISIT (OUTPATIENT)
Dept: ONCOLOGY | Facility: CLINIC | Age: 64
End: 2024-01-01
Attending: HOSPITALIST
Payer: COMMERCIAL

## 2024-01-01 ENCOUNTER — TELEPHONE (OUTPATIENT)
Dept: NEUROLOGY | Facility: CLINIC | Age: 64
End: 2024-01-01
Payer: COMMERCIAL

## 2024-01-01 ENCOUNTER — MYC MEDICAL ADVICE (OUTPATIENT)
Dept: ONCOLOGY | Facility: CLINIC | Age: 64
End: 2024-01-01
Payer: COMMERCIAL

## 2024-01-01 ENCOUNTER — HOSPITAL ENCOUNTER (OUTPATIENT)
Dept: PET IMAGING | Facility: HOSPITAL | Age: 64
Discharge: HOME | End: 2024-04-19
Attending: INTERNAL MEDICINE
Payer: COMMERCIAL

## 2024-01-01 ENCOUNTER — TELEPHONE (OUTPATIENT)
Dept: ONCOLOGY | Facility: CLINIC | Age: 64
End: 2024-01-01

## 2024-01-01 ENCOUNTER — TELEPHONE (OUTPATIENT)
Dept: NEUROLOGY | Facility: CLINIC | Age: 64
End: 2024-01-01

## 2024-01-01 ENCOUNTER — LAB (OUTPATIENT)
Dept: INFUSION THERAPY | Facility: CLINIC | Age: 64
End: 2024-01-01
Attending: HOSPITALIST
Payer: COMMERCIAL

## 2024-01-01 ENCOUNTER — OFFICE VISIT (OUTPATIENT)
Dept: NEUROSURGERY | Facility: CLINIC | Age: 64
End: 2024-01-01
Payer: COMMERCIAL

## 2024-01-01 ENCOUNTER — APPOINTMENT (OUTPATIENT)
Dept: MRI IMAGING | Facility: CLINIC | Age: 64
End: 2024-01-01
Attending: STUDENT IN AN ORGANIZED HEALTH CARE EDUCATION/TRAINING PROGRAM
Payer: MEDICARE

## 2024-01-01 ENCOUNTER — TELEPHONE (OUTPATIENT)
Dept: ONCOLOGY | Facility: CLINIC | Age: 64
End: 2024-01-01
Payer: COMMERCIAL

## 2024-01-01 ENCOUNTER — APPOINTMENT (OUTPATIENT)
Dept: CT IMAGING | Facility: CLINIC | Age: 64
DRG: 100 | End: 2024-01-01
Attending: EMERGENCY MEDICINE
Payer: COMMERCIAL

## 2024-01-01 ENCOUNTER — APPOINTMENT (OUTPATIENT)
Dept: GENERAL RADIOLOGY | Facility: CLINIC | Age: 64
End: 2024-01-01
Attending: INTERNAL MEDICINE
Payer: MEDICARE

## 2024-01-01 ENCOUNTER — PATIENT OUTREACH (OUTPATIENT)
Dept: CARE COORDINATION | Facility: CLINIC | Age: 64
End: 2024-01-01
Payer: COMMERCIAL

## 2024-01-01 ENCOUNTER — VIRTUAL VISIT (OUTPATIENT)
Dept: NEUROLOGY | Facility: CLINIC | Age: 64
End: 2024-01-01
Payer: COMMERCIAL

## 2024-01-01 ENCOUNTER — APPOINTMENT (OUTPATIENT)
Dept: GENERAL RADIOLOGY | Facility: CLINIC | Age: 64
DRG: 100 | End: 2024-01-01
Attending: EMERGENCY MEDICINE
Payer: COMMERCIAL

## 2024-01-01 ENCOUNTER — HOSPITAL ENCOUNTER (OUTPATIENT)
Dept: CT IMAGING | Facility: CLINIC | Age: 64
Discharge: HOME | End: 2024-03-29
Attending: PHYSICIAN ASSISTANT | Admitting: PHYSICIAN ASSISTANT
Payer: COMMERCIAL

## 2024-01-01 ENCOUNTER — HOSPITAL ENCOUNTER (OUTPATIENT)
Dept: PET IMAGING | Facility: HOSPITAL | Age: 64
Discharge: HOME | End: 2024-03-01
Attending: INTERNAL MEDICINE
Payer: COMMERCIAL

## 2024-01-01 ENCOUNTER — HOSPITAL ENCOUNTER (EMERGENCY)
Facility: CLINIC | Age: 64
Discharge: OTHER INSTITUTION | DRG: 100 | End: 2024-03-13
Attending: EMERGENCY MEDICINE | Admitting: EMERGENCY MEDICINE
Payer: COMMERCIAL

## 2024-01-01 VITALS
OXYGEN SATURATION: 99 % | BODY MASS INDEX: 20.52 KG/M2 | SYSTOLIC BLOOD PRESSURE: 130 MMHG | HEART RATE: 68 BPM | WEIGHT: 112.2 LBS | DIASTOLIC BLOOD PRESSURE: 68 MMHG | RESPIRATION RATE: 16 BRPM

## 2024-01-01 VITALS
SYSTOLIC BLOOD PRESSURE: 128 MMHG | TEMPERATURE: 97.8 F | OXYGEN SATURATION: 99 % | DIASTOLIC BLOOD PRESSURE: 88 MMHG | RESPIRATION RATE: 15 BRPM | HEART RATE: 74 BPM

## 2024-01-01 VITALS — HEIGHT: 62 IN | BODY MASS INDEX: 22.08 KG/M2 | WEIGHT: 120 LBS

## 2024-01-01 VITALS
BODY MASS INDEX: 21.84 KG/M2 | OXYGEN SATURATION: 100 % | DIASTOLIC BLOOD PRESSURE: 98 MMHG | SYSTOLIC BLOOD PRESSURE: 152 MMHG | HEART RATE: 54 BPM | TEMPERATURE: 97.6 F | WEIGHT: 119.4 LBS | RESPIRATION RATE: 16 BRPM

## 2024-01-01 VITALS
BODY MASS INDEX: 20.85 KG/M2 | WEIGHT: 114 LBS | TEMPERATURE: 97.4 F | OXYGEN SATURATION: 100 % | DIASTOLIC BLOOD PRESSURE: 114 MMHG | HEART RATE: 62 BPM | SYSTOLIC BLOOD PRESSURE: 166 MMHG

## 2024-01-01 VITALS
DIASTOLIC BLOOD PRESSURE: 99 MMHG | WEIGHT: 106.6 LBS | HEART RATE: 100 BPM | HEIGHT: 62 IN | BODY MASS INDEX: 19.62 KG/M2 | SYSTOLIC BLOOD PRESSURE: 152 MMHG | RESPIRATION RATE: 18 BRPM | OXYGEN SATURATION: 98 % | TEMPERATURE: 97.7 F

## 2024-01-01 VITALS
SYSTOLIC BLOOD PRESSURE: 120 MMHG | RESPIRATION RATE: 16 BRPM | WEIGHT: 112 LBS | BODY MASS INDEX: 20.48 KG/M2 | TEMPERATURE: 97.4 F | HEART RATE: 67 BPM | OXYGEN SATURATION: 99 % | DIASTOLIC BLOOD PRESSURE: 80 MMHG

## 2024-01-01 VITALS
SYSTOLIC BLOOD PRESSURE: 158 MMHG | BODY MASS INDEX: 21.9 KG/M2 | HEART RATE: 61 BPM | OXYGEN SATURATION: 98 % | WEIGHT: 119 LBS | DIASTOLIC BLOOD PRESSURE: 99 MMHG | HEIGHT: 62 IN | TEMPERATURE: 98 F

## 2024-01-01 VITALS — BODY MASS INDEX: 20.24 KG/M2 | WEIGHT: 110 LBS | HEIGHT: 62 IN

## 2024-01-01 VITALS — HEIGHT: 62 IN | BODY MASS INDEX: 20.61 KG/M2 | WEIGHT: 112 LBS

## 2024-01-01 VITALS — WEIGHT: 120 LBS | BODY MASS INDEX: 22.08 KG/M2 | HEIGHT: 62 IN

## 2024-01-01 DIAGNOSIS — C50.919 PRIMARY MALIGNANT NEOPLASM OF BREAST WITH METASTASIS (H): ICD-10-CM

## 2024-01-01 DIAGNOSIS — T45.1X5A CHEMOTHERAPY-INDUCED NEUTROPENIA (H): ICD-10-CM

## 2024-01-01 DIAGNOSIS — C50.911 BILATERAL MALIGNANT NEOPLASM OF BREAST IN FEMALE, UNSPECIFIED ESTROGEN RECEPTOR STATUS, UNSPECIFIED SITE OF BREAST (H): Primary | ICD-10-CM

## 2024-01-01 DIAGNOSIS — F33.1 MAJOR DEPRESSIVE DISORDER, RECURRENT EPISODE, MODERATE (H): ICD-10-CM

## 2024-01-01 DIAGNOSIS — C50.919 BREAST CANCER METASTASIZED TO BRAIN, UNSPECIFIED LATERALITY (H): ICD-10-CM

## 2024-01-01 DIAGNOSIS — C50.919 PRIMARY MALIGNANT NEOPLASM OF BREAST WITH METASTASIS (H): Primary | ICD-10-CM

## 2024-01-01 DIAGNOSIS — C50.912 BILATERAL MALIGNANT NEOPLASM OF BREAST IN FEMALE, UNSPECIFIED ESTROGEN RECEPTOR STATUS, UNSPECIFIED SITE OF BREAST (H): Primary | ICD-10-CM

## 2024-01-01 DIAGNOSIS — G43.009 MIGRAINE WITHOUT AURA AND WITHOUT STATUS MIGRAINOSUS, NOT INTRACTABLE: ICD-10-CM

## 2024-01-01 DIAGNOSIS — D70.1 CHEMOTHERAPY-INDUCED NEUTROPENIA (H): ICD-10-CM

## 2024-01-01 DIAGNOSIS — G89.3 CANCER ASSOCIATED PAIN: ICD-10-CM

## 2024-01-01 DIAGNOSIS — C50.911 BILATERAL MALIGNANT NEOPLASM OF BREAST IN FEMALE, UNSPECIFIED ESTROGEN RECEPTOR STATUS, UNSPECIFIED SITE OF BREAST (H): ICD-10-CM

## 2024-01-01 DIAGNOSIS — G47.9 DIFFICULTY SLEEPING: Primary | ICD-10-CM

## 2024-01-01 DIAGNOSIS — M53.3 SACROILIAC JOINT PAIN: ICD-10-CM

## 2024-01-01 DIAGNOSIS — C79.51 MALIGNANT NEOPLASM METASTATIC TO BONE (H): ICD-10-CM

## 2024-01-01 DIAGNOSIS — G43.111 INTRACTABLE MIGRAINE WITH AURA WITH STATUS MIGRAINOSUS: ICD-10-CM

## 2024-01-01 DIAGNOSIS — I60.9 SUBARACHNOID HEMORRHAGE (H): Primary | ICD-10-CM

## 2024-01-01 DIAGNOSIS — T45.1X5A CHEMOTHERAPY-INDUCED NAUSEA: ICD-10-CM

## 2024-01-01 DIAGNOSIS — E87.6 HYPOKALEMIA: ICD-10-CM

## 2024-01-01 DIAGNOSIS — C50.912 BILATERAL MALIGNANT NEOPLASM OF BREAST IN FEMALE, UNSPECIFIED ESTROGEN RECEPTOR STATUS, UNSPECIFIED SITE OF BREAST (H): ICD-10-CM

## 2024-01-01 DIAGNOSIS — I60.9 SUBARACHNOID HEMORRHAGE (H): ICD-10-CM

## 2024-01-01 DIAGNOSIS — Z12.11 SCREEN FOR COLON CANCER: Primary | ICD-10-CM

## 2024-01-01 DIAGNOSIS — T45.1X5A ANEMIA ASSOCIATED WITH CHEMOTHERAPY: ICD-10-CM

## 2024-01-01 DIAGNOSIS — F41.9 ANXIETY: ICD-10-CM

## 2024-01-01 DIAGNOSIS — R56.9 SEIZURE (H): ICD-10-CM

## 2024-01-01 DIAGNOSIS — C79.51 MALIGNANT NEOPLASM METASTATIC TO BONE (H): Primary | ICD-10-CM

## 2024-01-01 DIAGNOSIS — I26.99 ACUTE PULMONARY EMBOLISM WITHOUT ACUTE COR PULMONALE, UNSPECIFIED PULMONARY EMBOLISM TYPE (H): ICD-10-CM

## 2024-01-01 DIAGNOSIS — R11.0 CHEMOTHERAPY-INDUCED NAUSEA: ICD-10-CM

## 2024-01-01 DIAGNOSIS — R56.9 NEW ONSET SEIZURE (H): ICD-10-CM

## 2024-01-01 DIAGNOSIS — D64.81 ANEMIA ASSOCIATED WITH CHEMOTHERAPY: ICD-10-CM

## 2024-01-01 DIAGNOSIS — C79.31 BREAST CANCER METASTASIZED TO BRAIN, UNSPECIFIED LATERALITY (H): ICD-10-CM

## 2024-01-01 DIAGNOSIS — R11.2 CHEMOTHERAPY INDUCED NAUSEA AND VOMITING: ICD-10-CM

## 2024-01-01 DIAGNOSIS — G47.00 INSOMNIA, UNSPECIFIED TYPE: Primary | ICD-10-CM

## 2024-01-01 DIAGNOSIS — T45.1X5A CHEMOTHERAPY INDUCED NAUSEA AND VOMITING: ICD-10-CM

## 2024-01-01 LAB
ACANTHOCYTES BLD QL SMEAR: NORMAL
ALBUMIN SERPL BCG-MCNC: 3.6 G/DL (ref 3.5–5.2)
ALBUMIN SERPL BCG-MCNC: 3.6 G/DL (ref 3.5–5.2)
ALBUMIN SERPL BCG-MCNC: 3.8 G/DL (ref 3.5–5.2)
ALBUMIN SERPL BCG-MCNC: 3.9 G/DL (ref 3.5–5.2)
ALBUMIN SERPL BCG-MCNC: 4.1 G/DL (ref 3.5–5.2)
ALBUMIN UR-MCNC: NEGATIVE MG/DL
ALP SERPL-CCNC: 101 U/L (ref 40–150)
ALP SERPL-CCNC: 225 U/L (ref 40–150)
ALP SERPL-CCNC: 76 U/L (ref 40–150)
ALP SERPL-CCNC: 96 U/L (ref 40–150)
ALP SERPL-CCNC: 97 U/L (ref 40–150)
ALT SERPL W P-5'-P-CCNC: 11 U/L (ref 0–50)
ALT SERPL W P-5'-P-CCNC: 11 U/L (ref 0–50)
ALT SERPL W P-5'-P-CCNC: 12 U/L (ref 0–50)
ALT SERPL W P-5'-P-CCNC: 12 U/L (ref 0–50)
ALT SERPL W P-5'-P-CCNC: 8 U/L (ref 0–50)
ANION GAP SERPL CALCULATED.3IONS-SCNC: 10 MMOL/L (ref 7–15)
ANION GAP SERPL CALCULATED.3IONS-SCNC: 22 MMOL/L (ref 7–15)
ANION GAP SERPL CALCULATED.3IONS-SCNC: 9 MMOL/L (ref 7–15)
APPEARANCE UR: CLEAR
AST SERPL W P-5'-P-CCNC: 25 U/L (ref 0–45)
AST SERPL W P-5'-P-CCNC: 27 U/L (ref 0–45)
AST SERPL W P-5'-P-CCNC: 29 U/L (ref 0–45)
AST SERPL W P-5'-P-CCNC: 30 U/L (ref 0–45)
AST SERPL W P-5'-P-CCNC: 31 U/L (ref 0–45)
AUER BODIES BLD QL SMEAR: NORMAL
BACTERIA BLD CULT: NO GROWTH
BACTERIA BLD CULT: NO GROWTH
BASE EXCESS BLDV CALC-SCNC: 3.3 MMOL/L (ref -3–3)
BASO STIPL BLD QL SMEAR: NORMAL
BASOPHILS # BLD AUTO: 0 10E3/UL (ref 0–0.2)
BASOPHILS # BLD AUTO: 0.1 10E3/UL (ref 0–0.2)
BASOPHILS NFR BLD AUTO: 0 %
BASOPHILS NFR BLD AUTO: 1 %
BILIRUB SERPL-MCNC: 0.2 MG/DL
BILIRUB SERPL-MCNC: 0.3 MG/DL
BILIRUB SERPL-MCNC: 0.5 MG/DL
BILIRUB UR QL STRIP: NEGATIVE
BITE CELLS BLD QL SMEAR: NORMAL
BLISTER CELLS BLD QL SMEAR: NORMAL
BUN SERPL-MCNC: 10 MG/DL (ref 8–23)
BUN SERPL-MCNC: 10.8 MG/DL (ref 8–23)
BUN SERPL-MCNC: 6.5 MG/DL (ref 8–23)
BUN SERPL-MCNC: 6.6 MG/DL (ref 8–23)
BUN SERPL-MCNC: 9.8 MG/DL (ref 8–23)
BURR CELLS BLD QL SMEAR: NORMAL
CALCIUM SERPL-MCNC: 8.6 MG/DL (ref 8.8–10.2)
CALCIUM SERPL-MCNC: 8.9 MG/DL (ref 8.8–10.2)
CALCIUM SERPL-MCNC: 8.9 MG/DL (ref 8.8–10.2)
CALCIUM SERPL-MCNC: 9.1 MG/DL (ref 8.8–10.2)
CALCIUM SERPL-MCNC: 9.8 MG/DL (ref 8.8–10.2)
CANCER AG15-3 SERPL-ACNC: 452 U/ML
CANCER AG15-3 SERPL-ACNC: 524 U/ML
CANCER AG15-3 SERPL-ACNC: 622 U/ML
CANCER AG15-3 SERPL-ACNC: 709 U/ML
CHLORIDE SERPL-SCNC: 100 MMOL/L (ref 98–107)
CHLORIDE SERPL-SCNC: 101 MMOL/L (ref 98–107)
CHLORIDE SERPL-SCNC: 102 MMOL/L (ref 98–107)
CHLORIDE SERPL-SCNC: 91 MMOL/L (ref 98–107)
CHLORIDE SERPL-SCNC: 97 MMOL/L (ref 98–107)
COLOR UR AUTO: YELLOW
CREAT SERPL-MCNC: 0.72 MG/DL (ref 0.51–0.95)
CREAT SERPL-MCNC: 0.73 MG/DL (ref 0.51–0.95)
CREAT SERPL-MCNC: 0.75 MG/DL (ref 0.51–0.95)
CREAT SERPL-MCNC: 0.75 MG/DL (ref 0.51–0.95)
CREAT SERPL-MCNC: 0.83 MG/DL (ref 0.51–0.95)
DACRYOCYTES BLD QL SMEAR: NORMAL
DEPRECATED HCO3 PLAS-SCNC: 24 MMOL/L (ref 22–29)
DEPRECATED HCO3 PLAS-SCNC: 26 MMOL/L (ref 22–29)
DEPRECATED HCO3 PLAS-SCNC: 28 MMOL/L (ref 22–29)
DEPRECATED HCO3 PLAS-SCNC: 29 MMOL/L (ref 22–29)
DEPRECATED HCO3 PLAS-SCNC: 32 MMOL/L (ref 22–29)
EGFRCR SERPLBLD CKD-EPI 2021: 80 ML/MIN/1.73M2
EGFRCR SERPLBLD CKD-EPI 2021: 90 ML/MIN/1.73M2
EGFRCR SERPLBLD CKD-EPI 2021: 90 ML/MIN/1.73M2
EGFRCR SERPLBLD CKD-EPI 2021: >90 ML/MIN/1.73M2
EGFRCR SERPLBLD CKD-EPI 2021: >90 ML/MIN/1.73M2
ELLIPTOCYTES BLD QL SMEAR: NORMAL
EOSINOPHIL # BLD AUTO: 0 10E3/UL (ref 0–0.7)
EOSINOPHIL # BLD AUTO: 0.1 10E3/UL (ref 0–0.7)
EOSINOPHIL NFR BLD AUTO: 0 %
EOSINOPHIL NFR BLD AUTO: 1 %
ERYTHROCYTE [DISTWIDTH] IN BLOOD BY AUTOMATED COUNT: 15.1 % (ref 10–15)
ERYTHROCYTE [DISTWIDTH] IN BLOOD BY AUTOMATED COUNT: 15.4 % (ref 10–15)
ERYTHROCYTE [DISTWIDTH] IN BLOOD BY AUTOMATED COUNT: 15.9 % (ref 10–15)
ERYTHROCYTE [DISTWIDTH] IN BLOOD BY AUTOMATED COUNT: 16 % (ref 10–15)
ERYTHROCYTE [DISTWIDTH] IN BLOOD BY AUTOMATED COUNT: 16.1 % (ref 10–15)
ERYTHROCYTE [DISTWIDTH] IN BLOOD BY AUTOMATED COUNT: 16.1 % (ref 10–15)
FRAGMENTS BLD QL SMEAR: NORMAL
GIANT PLATELETS BLD QL SMEAR: NORMAL
GLUCOSE BLDC GLUCOMTR-MCNC: 72 MG/DL (ref 70–99)
GLUCOSE BLDC GLUCOMTR-MCNC: 77 MG/DL (ref 70–99)
GLUCOSE SERPL-MCNC: 107 MG/DL (ref 70–99)
GLUCOSE SERPL-MCNC: 116 MG/DL (ref 70–99)
GLUCOSE SERPL-MCNC: 122 MG/DL (ref 70–99)
GLUCOSE SERPL-MCNC: 168 MG/DL (ref 70–99)
GLUCOSE SERPL-MCNC: 99 MG/DL (ref 70–99)
GLUCOSE UR STRIP-MCNC: NEGATIVE MG/DL
HCO3 BLDV-SCNC: 29 MMOL/L (ref 21–28)
HCT VFR BLD AUTO: 30.7 % (ref 35–47)
HCT VFR BLD AUTO: 31.9 % (ref 35–47)
HCT VFR BLD AUTO: 34.4 % (ref 35–47)
HCT VFR BLD AUTO: 35.6 % (ref 35–47)
HCT VFR BLD AUTO: 37.6 % (ref 35–47)
HCT VFR BLD AUTO: 38.2 % (ref 35–47)
HGB BLD-MCNC: 10.1 G/DL (ref 11.7–15.7)
HGB BLD-MCNC: 10.9 G/DL (ref 11.7–15.7)
HGB BLD-MCNC: 11.4 G/DL (ref 11.7–15.7)
HGB BLD-MCNC: 11.8 G/DL (ref 11.7–15.7)
HGB BLD-MCNC: 12.6 G/DL (ref 11.7–15.7)
HGB BLD-MCNC: 9.9 G/DL (ref 11.7–15.7)
HGB C CRYSTALS: NORMAL
HGB UR QL STRIP: NEGATIVE
HOWELL-JOLLY BOD BLD QL SMEAR: NORMAL
IMM GRANULOCYTES # BLD: 0.1 10E3/UL
IMM GRANULOCYTES # BLD: 1.6 10E3/UL
IMM GRANULOCYTES NFR BLD: 1 %
IMM GRANULOCYTES NFR BLD: 2 %
IMM GRANULOCYTES NFR BLD: 4 %
KETONES UR STRIP-MCNC: 60 MG/DL
LEUKOCYTE ESTERASE UR QL STRIP: ABNORMAL
LYMPHOCYTES # BLD AUTO: 0.3 10E3/UL (ref 0–5.3)
LYMPHOCYTES # BLD AUTO: 0.6 10E3/UL (ref 0.8–5.3)
LYMPHOCYTES # BLD AUTO: 0.6 10E3/UL (ref 0.8–5.3)
LYMPHOCYTES # BLD AUTO: 0.7 10E3/UL (ref 0.8–5.3)
LYMPHOCYTES # BLD AUTO: 0.7 10E3/UL (ref 0–5.3)
LYMPHOCYTES NFR BLD AUTO: 1 %
LYMPHOCYTES NFR BLD AUTO: 12 %
LYMPHOCYTES NFR BLD AUTO: 8 %
LYMPHOCYTES NFR BLD AUTO: 8 %
LYMPHOCYTES NFR BLD AUTO: 9 %
MAGNESIUM SERPL-MCNC: 1.8 MG/DL (ref 1.7–2.3)
MCH RBC QN AUTO: 27.1 PG (ref 26.5–33)
MCH RBC QN AUTO: 27.5 PG (ref 26.5–33)
MCH RBC QN AUTO: 27.5 PG (ref 26.5–33)
MCH RBC QN AUTO: 27.6 PG (ref 26.5–33)
MCH RBC QN AUTO: 27.7 PG (ref 26.5–33)
MCH RBC QN AUTO: 27.8 PG (ref 26.5–33)
MCHC RBC AUTO-ENTMCNC: 31 G/DL (ref 31.5–36.5)
MCHC RBC AUTO-ENTMCNC: 31.4 G/DL (ref 31.5–36.5)
MCHC RBC AUTO-ENTMCNC: 31.7 G/DL (ref 31.5–36.5)
MCHC RBC AUTO-ENTMCNC: 32 G/DL (ref 31.5–36.5)
MCHC RBC AUTO-ENTMCNC: 32.9 G/DL (ref 31.5–36.5)
MCHC RBC AUTO-ENTMCNC: 33 G/DL (ref 31.5–36.5)
MCV RBC AUTO: 84 FL (ref 78–100)
MCV RBC AUTO: 84 FL (ref 78–100)
MCV RBC AUTO: 86 FL (ref 78–100)
MCV RBC AUTO: 87 FL (ref 78–100)
MCV RBC AUTO: 87 FL (ref 78–100)
MCV RBC AUTO: 89 FL (ref 78–100)
MONOCYTES # BLD AUTO: 0.9 10E3/UL (ref 0–1.3)
MONOCYTES # BLD AUTO: 1 10E3/UL (ref 0–1.3)
MONOCYTES # BLD AUTO: 1 10E3/UL (ref 0–1.3)
MONOCYTES # BLD AUTO: 1.1 10E3/UL (ref 0–1.3)
MONOCYTES # BLD AUTO: 1.2 10E3/UL (ref 0–1.3)
MONOCYTES NFR BLD AUTO: 12 %
MONOCYTES NFR BLD AUTO: 13 %
MONOCYTES NFR BLD AUTO: 14 %
MONOCYTES NFR BLD AUTO: 15 %
MONOCYTES NFR BLD AUTO: 3 %
MUCOUS THREADS #/AREA URNS LPF: PRESENT /LPF
NEUTROPHILS # BLD AUTO: 38.4 10E3/UL (ref 1.6–8.3)
NEUTROPHILS # BLD AUTO: 4.2 10E3/UL (ref 1.6–8.3)
NEUTROPHILS # BLD AUTO: 5.3 10E3/UL (ref 1.6–8.3)
NEUTROPHILS # BLD AUTO: 6 10E3/UL (ref 1.6–8.3)
NEUTROPHILS # BLD AUTO: 7.2 10E3/UL (ref 1.6–8.3)
NEUTROPHILS NFR BLD AUTO: 70 %
NEUTROPHILS NFR BLD AUTO: 73 %
NEUTROPHILS NFR BLD AUTO: 76 %
NEUTROPHILS NFR BLD AUTO: 78 %
NEUTROPHILS NFR BLD AUTO: 93 %
NEUTS HYPERSEG BLD QL SMEAR: NORMAL
NITRATE UR QL: NEGATIVE
NRBC # BLD AUTO: 0 10E3/UL
NRBC BLD AUTO-RTO: 0 /100
O2/TOTAL GAS SETTING VFR VENT: 21 %
OXYHGB MFR BLDV: 63 % (ref 70–75)
PATH REV: NORMAL
PCO2 BLDV: 48 MM HG (ref 40–50)
PH BLDV: 7.39 [PH] (ref 7.32–7.43)
PH UR STRIP: 6 [PH] (ref 5–7)
PLAT MORPH BLD: NORMAL
PLATELET # BLD AUTO: 156 10E3/UL (ref 150–450)
PLATELET # BLD AUTO: 168 10E3/UL (ref 150–450)
PLATELET # BLD AUTO: 178 10E3/UL (ref 150–450)
PLATELET # BLD AUTO: 189 10E3/UL (ref 150–450)
PLATELET # BLD AUTO: 192 10E3/UL (ref 150–450)
PLATELET # BLD AUTO: 197 10E3/UL (ref 150–450)
PO2 BLDV: 38 MM HG (ref 25–47)
POLYCHROMASIA BLD QL SMEAR: NORMAL
POTASSIUM SERPL-SCNC: 2.6 MMOL/L (ref 3.4–5.3)
POTASSIUM SERPL-SCNC: 2.9 MMOL/L (ref 3.4–5.3)
POTASSIUM SERPL-SCNC: 3.1 MMOL/L (ref 3.4–5.3)
POTASSIUM SERPL-SCNC: 3.3 MMOL/L (ref 3.4–5.3)
POTASSIUM SERPL-SCNC: 3.6 MMOL/L (ref 3.4–5.3)
POTASSIUM SERPL-SCNC: 3.7 MMOL/L (ref 3.4–5.3)
POTASSIUM SERPL-SCNC: 3.8 MMOL/L (ref 3.4–5.3)
POTASSIUM SERPL-SCNC: 4.2 MMOL/L (ref 3.4–5.3)
PROT SERPL-MCNC: 5.9 G/DL (ref 6.4–8.3)
PROT SERPL-MCNC: 6.1 G/DL (ref 6.4–8.3)
PROT SERPL-MCNC: 6.2 G/DL (ref 6.4–8.3)
PROT SERPL-MCNC: 6.6 G/DL (ref 6.4–8.3)
PROT SERPL-MCNC: 7 G/DL (ref 6.4–8.3)
RADIOLOGIST FLAGS: ABNORMAL
RBC # BLD AUTO: 3.64 10E6/UL (ref 3.8–5.2)
RBC # BLD AUTO: 3.65 10E6/UL (ref 3.8–5.2)
RBC # BLD AUTO: 3.96 10E6/UL (ref 3.8–5.2)
RBC # BLD AUTO: 4.15 10E6/UL (ref 3.8–5.2)
RBC # BLD AUTO: 4.25 10E6/UL (ref 3.8–5.2)
RBC # BLD AUTO: 4.56 10E6/UL (ref 3.8–5.2)
RBC AGGLUT BLD QL: NORMAL
RBC MORPH BLD: NORMAL
RBC URINE: 2 /HPF
ROULEAUX BLD QL SMEAR: NORMAL
SAO2 % BLDV: 63.7 % (ref 70–75)
SICKLE CELLS BLD QL SMEAR: NORMAL
SMUDGE CELLS BLD QL SMEAR: NORMAL
SODIUM SERPL-SCNC: 136 MMOL/L (ref 135–145)
SODIUM SERPL-SCNC: 137 MMOL/L (ref 135–145)
SODIUM SERPL-SCNC: 138 MMOL/L (ref 135–145)
SODIUM SERPL-SCNC: 139 MMOL/L (ref 135–145)
SODIUM SERPL-SCNC: 139 MMOL/L (ref 135–145)
SP GR UR STRIP: 1.01 (ref 1–1.03)
SPHEROCYTES BLD QL SMEAR: NORMAL
STOMATOCYTES BLD QL SMEAR: NORMAL
TARGETS BLD QL SMEAR: NORMAL
TOXIC GRANULES BLD QL SMEAR: NORMAL
UROBILINOGEN UR STRIP-MCNC: NORMAL MG/DL
VARIANT LYMPHS BLD QL SMEAR: NORMAL
WBC # BLD AUTO: 38.5 10E3/UL (ref 4–11)
WBC # BLD AUTO: 41.5 10E3/UL (ref 4–11)
WBC # BLD AUTO: 6 10E3/UL (ref 4–11)
WBC # BLD AUTO: 7.1 10E3/UL (ref 4–11)
WBC # BLD AUTO: 7.8 10E3/UL (ref 4–11)
WBC # BLD AUTO: 9.3 10E3/UL (ref 4–11)
WBC CLUMPS #/AREA URNS HPF: PRESENT /HPF
WBC URINE: 6 /HPF

## 2024-01-01 PROCEDURE — 250N000011 HC RX IP 250 OP 636: Performed by: INTERNAL MEDICINE

## 2024-01-01 PROCEDURE — 96377 APPLICATON ON-BODY INJECTOR: CPT | Mod: XS | Performed by: INTERNAL MEDICINE

## 2024-01-01 PROCEDURE — 71260 CT THORAX DX C+: CPT

## 2024-01-01 PROCEDURE — 258N000003 HC RX IP 258 OP 636: Performed by: INTERNAL MEDICINE

## 2024-01-01 PROCEDURE — 99215 OFFICE O/P EST HI 40 MIN: CPT | Performed by: INTERNAL MEDICINE

## 2024-01-01 PROCEDURE — 84132 ASSAY OF SERUM POTASSIUM: CPT | Performed by: INTERNAL MEDICINE

## 2024-01-01 PROCEDURE — 99214 OFFICE O/P EST MOD 30 MIN: CPT | Mod: 95 | Performed by: HOSPITALIST

## 2024-01-01 PROCEDURE — 36591 DRAW BLOOD OFF VENOUS DEVICE: CPT | Performed by: INTERNAL MEDICINE

## 2024-01-01 PROCEDURE — 250N000013 HC RX MED GY IP 250 OP 250 PS 637: Performed by: INTERNAL MEDICINE

## 2024-01-01 PROCEDURE — 82805 BLOOD GASES W/O2 SATURATION: CPT | Performed by: EMERGENCY MEDICINE

## 2024-01-01 PROCEDURE — 255N000002 HC RX 255 OP 636: Performed by: HOSPITALIST

## 2024-01-01 PROCEDURE — 96365 THER/PROPH/DIAG IV INF INIT: CPT | Performed by: EMERGENCY MEDICINE

## 2024-01-01 PROCEDURE — 80053 COMPREHEN METABOLIC PANEL: CPT | Performed by: INTERNAL MEDICINE

## 2024-01-01 PROCEDURE — 70553 MRI BRAIN STEM W/O & W/DYE: CPT

## 2024-01-01 PROCEDURE — 99207 PR NO CHARGE LOS: CPT

## 2024-01-01 PROCEDURE — 85027 COMPLETE CBC AUTOMATED: CPT | Performed by: INTERNAL MEDICINE

## 2024-01-01 PROCEDURE — 99215 OFFICE O/P EST HI 40 MIN: CPT | Mod: 95 | Performed by: INTERNAL MEDICINE

## 2024-01-01 PROCEDURE — 258N000003 HC RX IP 258 OP 636: Mod: JZ | Performed by: EMERGENCY MEDICINE

## 2024-01-01 PROCEDURE — 96361 HYDRATE IV INFUSION ADD-ON: CPT | Performed by: EMERGENCY MEDICINE

## 2024-01-01 PROCEDURE — 96372 THER/PROPH/DIAG INJ SC/IM: CPT | Performed by: INTERNAL MEDICINE

## 2024-01-01 PROCEDURE — 99212 OFFICE O/P EST SF 10 MIN: CPT | Mod: 95 | Performed by: NURSE PRACTITIONER

## 2024-01-01 PROCEDURE — 99213 OFFICE O/P EST LOW 20 MIN: CPT

## 2024-01-01 PROCEDURE — 99221 1ST HOSP IP/OBS SF/LOW 40: CPT | Performed by: PHYSICIAN ASSISTANT

## 2024-01-01 PROCEDURE — 99239 HOSP IP/OBS DSCHRG MGMT >30: CPT | Performed by: INTERNAL MEDICINE

## 2024-01-01 PROCEDURE — A9585 GADOBUTROL INJECTION: HCPCS | Performed by: HOSPITALIST

## 2024-01-01 PROCEDURE — 99291 CRITICAL CARE FIRST HOUR: CPT | Mod: 25 | Performed by: EMERGENCY MEDICINE

## 2024-01-01 PROCEDURE — 96413 CHEMO IV INFUSION 1 HR: CPT

## 2024-01-01 PROCEDURE — 85025 COMPLETE CBC W/AUTO DIFF WBC: CPT | Performed by: INTERNAL MEDICINE

## 2024-01-01 PROCEDURE — 71045 X-RAY EXAM CHEST 1 VIEW: CPT

## 2024-01-01 PROCEDURE — 99207 PR NO CHARGE TRIAGED PS: CPT | Performed by: NURSE PRACTITIONER

## 2024-01-01 PROCEDURE — A9552 F18 FDG: HCPCS | Performed by: INTERNAL MEDICINE

## 2024-01-01 PROCEDURE — 70450 CT HEAD/BRAIN W/O DYE: CPT | Mod: 76

## 2024-01-01 PROCEDURE — 81001 URINALYSIS AUTO W/SCOPE: CPT | Performed by: INTERNAL MEDICINE

## 2024-01-01 PROCEDURE — 70450 CT HEAD/BRAIN W/O DYE: CPT

## 2024-01-01 PROCEDURE — 250N000011 HC RX IP 250 OP 636: Mod: JZ | Performed by: STUDENT IN AN ORGANIZED HEALTH CARE EDUCATION/TRAINING PROGRAM

## 2024-01-01 PROCEDURE — 96367 TX/PROPH/DG ADDL SEQ IV INF: CPT

## 2024-01-01 PROCEDURE — 78816 PET IMAGE W/CT FULL BODY: CPT | Mod: PS

## 2024-01-01 PROCEDURE — 120N000001 HC R&B MED SURG/OB

## 2024-01-01 PROCEDURE — 82040 ASSAY OF SERUM ALBUMIN: CPT | Performed by: EMERGENCY MEDICINE

## 2024-01-01 PROCEDURE — 86300 IMMUNOASSAY TUMOR CA 15-3: CPT | Performed by: INTERNAL MEDICINE

## 2024-01-01 PROCEDURE — 36415 COLL VENOUS BLD VENIPUNCTURE: CPT | Performed by: INTERNAL MEDICINE

## 2024-01-01 PROCEDURE — 99213 OFFICE O/P EST LOW 20 MIN: CPT | Mod: 25 | Performed by: NURSE PRACTITIONER

## 2024-01-01 PROCEDURE — 96375 TX/PRO/DX INJ NEW DRUG ADDON: CPT | Performed by: EMERGENCY MEDICINE

## 2024-01-01 PROCEDURE — 343N000001 HC RX 343: Performed by: INTERNAL MEDICINE

## 2024-01-01 PROCEDURE — 120N000013 HC R&B IMCU

## 2024-01-01 PROCEDURE — 250N000011 HC RX IP 250 OP 636: Performed by: STUDENT IN AN ORGANIZED HEALTH CARE EDUCATION/TRAINING PROGRAM

## 2024-01-01 PROCEDURE — 96372 THER/PROPH/DIAG INJ SC/IM: CPT | Mod: XU | Performed by: INTERNAL MEDICINE

## 2024-01-01 PROCEDURE — 99215 OFFICE O/P EST HI 40 MIN: CPT

## 2024-01-01 PROCEDURE — 99214 OFFICE O/P EST MOD 30 MIN: CPT | Performed by: INTERNAL MEDICINE

## 2024-01-01 PROCEDURE — 250N000011 HC RX IP 250 OP 636: Mod: JZ

## 2024-01-01 PROCEDURE — 96372 THER/PROPH/DIAG INJ SC/IM: CPT

## 2024-01-01 PROCEDURE — 99233 SBSQ HOSP IP/OBS HIGH 50: CPT | Performed by: INTERNAL MEDICINE

## 2024-01-01 PROCEDURE — 82962 GLUCOSE BLOOD TEST: CPT

## 2024-01-01 PROCEDURE — 250N000011 HC RX IP 250 OP 636: Mod: JZ | Performed by: INTERNAL MEDICINE

## 2024-01-01 PROCEDURE — 99213 OFFICE O/P EST LOW 20 MIN: CPT | Mod: 95 | Performed by: HOSPITALIST

## 2024-01-01 PROCEDURE — 87040 BLOOD CULTURE FOR BACTERIA: CPT | Performed by: INTERNAL MEDICINE

## 2024-01-01 PROCEDURE — 250N000011 HC RX IP 250 OP 636: Mod: JZ | Performed by: EMERGENCY MEDICINE

## 2024-01-01 PROCEDURE — 36415 COLL VENOUS BLD VENIPUNCTURE: CPT | Performed by: EMERGENCY MEDICINE

## 2024-01-01 PROCEDURE — 99214 OFFICE O/P EST MOD 30 MIN: CPT | Mod: 95 | Performed by: PHYSICIAN ASSISTANT

## 2024-01-01 PROCEDURE — 85025 COMPLETE CBC W/AUTO DIFF WBC: CPT | Performed by: EMERGENCY MEDICINE

## 2024-01-01 PROCEDURE — 93010 ELECTROCARDIOGRAM REPORT: CPT | Performed by: EMERGENCY MEDICINE

## 2024-01-01 PROCEDURE — 93005 ELECTROCARDIOGRAM TRACING: CPT | Performed by: EMERGENCY MEDICINE

## 2024-01-01 PROCEDURE — 99222 1ST HOSP IP/OBS MODERATE 55: CPT | Performed by: PHYSICIAN ASSISTANT

## 2024-01-01 PROCEDURE — 99223 1ST HOSP IP/OBS HIGH 75: CPT | Performed by: INTERNAL MEDICINE

## 2024-01-01 PROCEDURE — 99223 1ST HOSP IP/OBS HIGH 75: CPT | Performed by: STUDENT IN AN ORGANIZED HEALTH CARE EDUCATION/TRAINING PROGRAM

## 2024-01-01 PROCEDURE — 83735 ASSAY OF MAGNESIUM: CPT | Performed by: EMERGENCY MEDICINE

## 2024-01-01 PROCEDURE — 999N000248 HC STATISTIC IV INSERT WITH US BY RN

## 2024-01-01 RX ORDER — ONDANSETRON 4 MG/1
4 TABLET, ORALLY DISINTEGRATING ORAL EVERY 6 HOURS PRN
Status: DISCONTINUED | OUTPATIENT
Start: 2024-01-01 | End: 2024-01-01 | Stop reason: HOSPADM

## 2024-01-01 RX ORDER — ALBUTEROL SULFATE 0.83 MG/ML
2.5 SOLUTION RESPIRATORY (INHALATION)
Status: CANCELLED | OUTPATIENT
Start: 2024-01-01

## 2024-01-01 RX ORDER — HEPARIN SODIUM,PORCINE 10 UNIT/ML
5 VIAL (ML) INTRAVENOUS
Status: CANCELLED | OUTPATIENT
Start: 2024-01-01

## 2024-01-01 RX ORDER — PROCHLORPERAZINE MALEATE 10 MG
10 TABLET ORAL EVERY 6 HOURS PRN
Status: DISCONTINUED | OUTPATIENT
Start: 2024-01-01 | End: 2024-01-01 | Stop reason: HOSPADM

## 2024-01-01 RX ORDER — LEVETIRACETAM 5 MG/ML
500 INJECTION INTRAVASCULAR EVERY 12 HOURS
Status: DISCONTINUED | OUTPATIENT
Start: 2024-01-01 | End: 2024-01-01 | Stop reason: HOSPADM

## 2024-01-01 RX ORDER — HEPARIN SODIUM (PORCINE) LOCK FLUSH IV SOLN 100 UNIT/ML 100 UNIT/ML
5 SOLUTION INTRAVENOUS
Status: CANCELLED | OUTPATIENT
Start: 2024-01-01

## 2024-01-01 RX ORDER — ZOLMITRIPTAN 5 MG/1
1 SPRAY NASAL
Qty: 12 EACH | Refills: 11 | Status: SHIPPED | OUTPATIENT
Start: 2024-01-01

## 2024-01-01 RX ORDER — POTASSIUM CHLORIDE 1500 MG/1
20 TABLET, EXTENDED RELEASE ORAL ONCE
Status: COMPLETED | OUTPATIENT
Start: 2024-01-01 | End: 2024-01-01

## 2024-01-01 RX ORDER — HEPARIN SODIUM (PORCINE) LOCK FLUSH IV SOLN 100 UNIT/ML 100 UNIT/ML
5-10 SOLUTION INTRAVENOUS
Status: DISCONTINUED | OUTPATIENT
Start: 2024-01-01 | End: 2024-01-01 | Stop reason: HOSPADM

## 2024-01-01 RX ORDER — ACETAMINOPHEN 325 MG/1
650 TABLET ORAL EVERY 4 HOURS PRN
Status: DISCONTINUED | OUTPATIENT
Start: 2024-01-01 | End: 2024-01-01 | Stop reason: HOSPADM

## 2024-01-01 RX ORDER — NALOXONE HYDROCHLORIDE 0.4 MG/ML
0.4 INJECTION, SOLUTION INTRAMUSCULAR; INTRAVENOUS; SUBCUTANEOUS
Status: DISCONTINUED | OUTPATIENT
Start: 2024-01-01 | End: 2024-01-01 | Stop reason: HOSPADM

## 2024-01-01 RX ORDER — NALOXONE HYDROCHLORIDE 0.4 MG/ML
0.2 INJECTION, SOLUTION INTRAMUSCULAR; INTRAVENOUS; SUBCUTANEOUS
Status: DISCONTINUED | OUTPATIENT
Start: 2024-01-01 | End: 2024-01-01 | Stop reason: HOSPADM

## 2024-01-01 RX ORDER — NALOXONE HYDROCHLORIDE 0.4 MG/ML
0.2 INJECTION, SOLUTION INTRAMUSCULAR; INTRAVENOUS; SUBCUTANEOUS
Status: CANCELLED | OUTPATIENT
Start: 2024-01-01

## 2024-01-01 RX ORDER — LORAZEPAM 2 MG/ML
4 INJECTION INTRAMUSCULAR EVERY 5 MIN PRN
Status: DISCONTINUED | OUTPATIENT
Start: 2024-01-01 | End: 2024-01-01 | Stop reason: HOSPADM

## 2024-01-01 RX ORDER — DIPHENHYDRAMINE HYDROCHLORIDE 50 MG/ML
50 INJECTION INTRAMUSCULAR; INTRAVENOUS
Status: CANCELLED
Start: 2024-01-01

## 2024-01-01 RX ORDER — BUSPIRONE HYDROCHLORIDE 15 MG/1
15 TABLET ORAL 2 TIMES DAILY
Qty: 180 TABLET | Refills: 3 | Status: SHIPPED | OUTPATIENT
Start: 2024-01-01

## 2024-01-01 RX ORDER — DOXEPIN HYDROCHLORIDE 10 MG/ML
3 SOLUTION ORAL AT BEDTIME
Qty: 120 ML | Refills: 0 | Status: SHIPPED | OUTPATIENT
Start: 2024-01-01

## 2024-01-01 RX ORDER — HEPARIN SODIUM (PORCINE) LOCK FLUSH IV SOLN 100 UNIT/ML 100 UNIT/ML
5 SOLUTION INTRAVENOUS
Status: DISCONTINUED | OUTPATIENT
Start: 2024-01-01 | End: 2024-01-01 | Stop reason: HOSPADM

## 2024-01-01 RX ORDER — GADOBUTROL 604.72 MG/ML
5 INJECTION INTRAVENOUS ONCE
Status: COMPLETED | OUTPATIENT
Start: 2024-01-01 | End: 2024-01-01

## 2024-01-01 RX ORDER — ALBUTEROL SULFATE 90 UG/1
1-2 AEROSOL, METERED RESPIRATORY (INHALATION)
Status: CANCELLED
Start: 2024-01-01

## 2024-01-01 RX ORDER — ZOLMITRIPTAN 5 MG/1
1 SPRAY NASAL
Qty: 12 EACH | Refills: 9 | Status: SHIPPED | OUTPATIENT
Start: 2024-01-01 | End: 2024-01-01

## 2024-01-01 RX ORDER — IOPAMIDOL 755 MG/ML
55 INJECTION, SOLUTION INTRAVASCULAR ONCE
Status: COMPLETED | OUTPATIENT
Start: 2024-01-01 | End: 2024-01-01

## 2024-01-01 RX ORDER — ONDANSETRON 2 MG/ML
4 INJECTION INTRAMUSCULAR; INTRAVENOUS EVERY 6 HOURS PRN
Status: DISCONTINUED | OUTPATIENT
Start: 2024-01-01 | End: 2024-01-01 | Stop reason: HOSPADM

## 2024-01-01 RX ORDER — MORPHINE SULFATE 15 MG/1
15-30 TABLET ORAL
Qty: 60 TABLET | Refills: 0 | Status: SHIPPED | OUTPATIENT
Start: 2024-01-01

## 2024-01-01 RX ORDER — MEPERIDINE HYDROCHLORIDE 25 MG/ML
25 INJECTION INTRAMUSCULAR; INTRAVENOUS; SUBCUTANEOUS EVERY 30 MIN PRN
Status: CANCELLED | OUTPATIENT
Start: 2024-01-01

## 2024-01-01 RX ORDER — LORAZEPAM 0.5 MG/1
.5-1 TABLET ORAL EVERY 6 HOURS PRN
Status: DISCONTINUED | OUTPATIENT
Start: 2024-01-01 | End: 2024-01-01 | Stop reason: HOSPADM

## 2024-01-01 RX ORDER — POTASSIUM CHLORIDE 1500 MG/1
40 TABLET, EXTENDED RELEASE ORAL ONCE
Status: COMPLETED | OUTPATIENT
Start: 2024-01-01 | End: 2024-01-01

## 2024-01-01 RX ORDER — METHYLPREDNISOLONE SODIUM SUCCINATE 125 MG/2ML
125 INJECTION, POWDER, LYOPHILIZED, FOR SOLUTION INTRAMUSCULAR; INTRAVENOUS
Status: CANCELLED
Start: 2024-01-01

## 2024-01-01 RX ORDER — HEPARIN SODIUM,PORCINE 10 UNIT/ML
5-10 VIAL (ML) INTRAVENOUS
Status: DISCONTINUED | OUTPATIENT
Start: 2024-01-01 | End: 2024-01-01 | Stop reason: HOSPADM

## 2024-01-01 RX ORDER — BUSPIRONE HYDROCHLORIDE 15 MG/1
15 TABLET ORAL 2 TIMES DAILY
Status: DISCONTINUED | OUTPATIENT
Start: 2024-01-01 | End: 2024-01-01 | Stop reason: HOSPADM

## 2024-01-01 RX ORDER — LORAZEPAM 2 MG/ML
0.5 INJECTION INTRAMUSCULAR ONCE
Status: COMPLETED | OUTPATIENT
Start: 2024-01-01 | End: 2024-01-01

## 2024-01-01 RX ORDER — HEPARIN SODIUM (PORCINE) LOCK FLUSH IV SOLN 100 UNIT/ML 100 UNIT/ML
500 SOLUTION INTRAVENOUS ONCE
Status: COMPLETED | OUTPATIENT
Start: 2024-01-01 | End: 2024-01-01

## 2024-01-01 RX ORDER — PROPRANOLOL HCL 60 MG
60 CAPSULE, EXTENDED RELEASE 24HR ORAL
Status: DISCONTINUED | OUTPATIENT
Start: 2024-01-01 | End: 2024-01-01 | Stop reason: HOSPADM

## 2024-01-01 RX ORDER — CALCIUM CARBONATE 500 MG/1
1000 TABLET, CHEWABLE ORAL 4 TIMES DAILY PRN
Status: DISCONTINUED | OUTPATIENT
Start: 2024-01-01 | End: 2024-01-01 | Stop reason: HOSPADM

## 2024-01-01 RX ORDER — METOCLOPRAMIDE 5 MG/1
5-10 TABLET ORAL EVERY 6 HOURS PRN
Qty: 20 TABLET | Refills: 5 | Status: SHIPPED | OUTPATIENT
Start: 2024-01-01

## 2024-01-01 RX ORDER — HEPARIN SODIUM (PORCINE) LOCK FLUSH IV SOLN 100 UNIT/ML 100 UNIT/ML
500 SOLUTION INTRAVENOUS EVERY 8 HOURS
Status: DISCONTINUED | OUTPATIENT
Start: 2024-01-01 | End: 2024-01-01 | Stop reason: HOSPADM

## 2024-01-01 RX ORDER — EPINEPHRINE 1 MG/ML
0.3 INJECTION, SOLUTION INTRAMUSCULAR; SUBCUTANEOUS EVERY 5 MIN PRN
Status: CANCELLED | OUTPATIENT
Start: 2024-01-01

## 2024-01-01 RX ORDER — LIDOCAINE 40 MG/G
CREAM TOPICAL
Status: DISCONTINUED | OUTPATIENT
Start: 2024-01-01 | End: 2024-01-01 | Stop reason: HOSPADM

## 2024-01-01 RX ORDER — HEPARIN SODIUM,PORCINE 10 UNIT/ML
5-10 VIAL (ML) INTRAVENOUS EVERY 24 HOURS
Status: DISCONTINUED | OUTPATIENT
Start: 2024-01-01 | End: 2024-01-01 | Stop reason: HOSPADM

## 2024-01-01 RX ORDER — LORAZEPAM 2 MG/ML
.5-1 INJECTION INTRAMUSCULAR EVERY 4 HOURS PRN
Status: DISCONTINUED | OUTPATIENT
Start: 2024-01-01 | End: 2024-01-01 | Stop reason: HOSPADM

## 2024-01-01 RX ORDER — DOXEPIN 3 MG/1
3 TABLET, FILM COATED ORAL
Qty: 30 TABLET | Refills: 3 | Status: SHIPPED | OUTPATIENT
Start: 2024-01-01 | End: 2024-01-01

## 2024-01-01 RX ORDER — LEVETIRACETAM 500 MG/1
500 TABLET ORAL 2 TIMES DAILY
Qty: 60 TABLET | Refills: 1 | Status: SHIPPED | OUTPATIENT
Start: 2024-01-01

## 2024-01-01 RX ORDER — PROCHLORPERAZINE 25 MG
25 SUPPOSITORY, RECTAL RECTAL EVERY 12 HOURS PRN
Status: DISCONTINUED | OUTPATIENT
Start: 2024-01-01 | End: 2024-01-01 | Stop reason: HOSPADM

## 2024-01-01 RX ORDER — PROCHLORPERAZINE MALEATE 10 MG
10 TABLET ORAL EVERY 6 HOURS PRN
Qty: 30 TABLET | Refills: 1 | Status: SHIPPED | OUTPATIENT
Start: 2024-01-01

## 2024-01-01 RX ORDER — ONDANSETRON 4 MG/1
4 TABLET, ORALLY DISINTEGRATING ORAL EVERY 8 HOURS PRN
Qty: 10 TABLET | Refills: 0 | Status: SHIPPED | OUTPATIENT
Start: 2024-01-01

## 2024-01-01 RX ORDER — OXYCODONE HYDROCHLORIDE 5 MG/1
5 TABLET ORAL EVERY 4 HOURS PRN
Status: DISCONTINUED | OUTPATIENT
Start: 2024-01-01 | End: 2024-01-01 | Stop reason: HOSPADM

## 2024-01-01 RX ORDER — ACETAMINOPHEN 650 MG/1
650 SUPPOSITORY RECTAL EVERY 4 HOURS PRN
Status: DISCONTINUED | OUTPATIENT
Start: 2024-01-01 | End: 2024-01-01 | Stop reason: HOSPADM

## 2024-01-01 RX ORDER — PROPRANOLOL HYDROCHLORIDE 80 MG/1
CAPSULE, EXTENDED RELEASE ORAL
Qty: 270 CAPSULE | Refills: 3 | Status: SHIPPED | OUTPATIENT
Start: 2024-01-01

## 2024-01-01 RX ORDER — VENLAFAXINE HYDROCHLORIDE 75 MG/1
225 CAPSULE, EXTENDED RELEASE ORAL DAILY
Qty: 270 CAPSULE | Refills: 3 | Status: SHIPPED | OUTPATIENT
Start: 2024-01-01

## 2024-01-01 RX ADMIN — POTASSIUM CHLORIDE 20 MEQ: 1500 TABLET, EXTENDED RELEASE ORAL at 07:53

## 2024-01-01 RX ADMIN — FOSAPREPITANT: 150 INJECTION, POWDER, LYOPHILIZED, FOR SOLUTION INTRAVENOUS at 12:33

## 2024-01-01 RX ADMIN — DENOSUMAB 120 MG: 120 INJECTION SUBCUTANEOUS at 13:44

## 2024-01-01 RX ADMIN — FLUDEOXYGLUCOSE F-18 10.19 MILLICURIE: 500 INJECTION, SOLUTION INTRAVENOUS at 12:08

## 2024-01-01 RX ADMIN — PEGFILGRASTIM 6 MG: KIT SUBCUTANEOUS at 16:22

## 2024-01-01 RX ADMIN — DEXTROSE MONOHYDRATE 100 ML: 5 INJECTION INTRAVENOUS at 14:27

## 2024-01-01 RX ADMIN — VENLAFAXINE HYDROCHLORIDE 225 MG: 150 CAPSULE, EXTENDED RELEASE ORAL at 13:06

## 2024-01-01 RX ADMIN — DEXTROSE MONOHYDRATE 100 ML: 5 INJECTION INTRAVENOUS at 13:31

## 2024-01-01 RX ADMIN — BUSPIRONE HYDROCHLORIDE 15 MG: 15 TABLET ORAL at 11:46

## 2024-01-01 RX ADMIN — DEXAMETHASONE SODIUM PHOSPHATE: 10 INJECTION, SOLUTION INTRAMUSCULAR; INTRAVENOUS at 12:00

## 2024-01-01 RX ADMIN — Medication 500 UNITS: at 12:55

## 2024-01-01 RX ADMIN — DOXORUBICIN HYDROCHLORIDE 60 MG: 2 INJECTION, SUSPENSION, LIPOSOMAL INTRAVENOUS at 13:00

## 2024-01-01 RX ADMIN — ACETAMINOPHEN 650 MG: 325 TABLET, FILM COATED ORAL at 23:58

## 2024-01-01 RX ADMIN — Medication 500 UNITS: at 11:46

## 2024-01-01 RX ADMIN — Medication 5 ML: at 13:36

## 2024-01-01 RX ADMIN — GADOBUTROL 5 ML: 604.72 INJECTION INTRAVENOUS at 23:28

## 2024-01-01 RX ADMIN — DOXORUBICIN HYDROCHLORIDE 60 MG: 2 INJECTION, SUSPENSION, LIPOSOMAL INTRAVENOUS at 15:14

## 2024-01-01 RX ADMIN — LORAZEPAM 0.5 MG: 2 INJECTION INTRAMUSCULAR; INTRAVENOUS at 10:51

## 2024-01-01 RX ADMIN — DEXTROSE MONOHYDRATE 500 ML: 50 INJECTION, SOLUTION INTRAVENOUS at 12:24

## 2024-01-01 RX ADMIN — LORAZEPAM 0.5 MG: 2 INJECTION INTRAMUSCULAR; INTRAVENOUS at 22:32

## 2024-01-01 RX ADMIN — LEVETIRACETAM 500 MG: 5 INJECTION INTRAVENOUS at 00:11

## 2024-01-01 RX ADMIN — ACETAMINOPHEN 650 MG: 325 TABLET, FILM COATED ORAL at 12:31

## 2024-01-01 RX ADMIN — PEGFILGRASTIM 6 MG: KIT SUBCUTANEOUS at 14:07

## 2024-01-01 RX ADMIN — PROPRANOLOL HYDROCHLORIDE 60 MG: 60 CAPSULE, EXTENDED RELEASE ORAL at 11:47

## 2024-01-01 RX ADMIN — PEGFILGRASTIM 6 MG: KIT SUBCUTANEOUS at 14:53

## 2024-01-01 RX ADMIN — DENOSUMAB 120 MG: 120 INJECTION SUBCUTANEOUS at 14:33

## 2024-01-01 RX ADMIN — FOSAPREPITANT: 150 INJECTION, POWDER, LYOPHILIZED, FOR SOLUTION INTRAVENOUS at 14:48

## 2024-01-01 RX ADMIN — OXYCODONE HYDROCHLORIDE 5 MG: 5 TABLET ORAL at 03:10

## 2024-01-01 RX ADMIN — DENOSUMAB 120 MG: 120 INJECTION SUBCUTANEOUS at 12:47

## 2024-01-01 RX ADMIN — DEXTROSE MONOHYDRATE 500 ML: 50 INJECTION, SOLUTION INTRAVENOUS at 11:43

## 2024-01-01 RX ADMIN — HEPARIN SODIUM (PORCINE) LOCK FLUSH IV SOLN 100 UNIT/ML 5 ML: 100 SOLUTION at 19:08

## 2024-01-01 RX ADMIN — IOPAMIDOL 55 ML: 755 INJECTION, SOLUTION INTRAVENOUS at 13:15

## 2024-01-01 RX ADMIN — FLUDEOXYGLUCOSE F-18 10.17 MILLICURIE: 500 INJECTION, SOLUTION INTRAVENOUS at 13:53

## 2024-01-01 RX ADMIN — OXYCODONE HYDROCHLORIDE 5 MG: 5 TABLET ORAL at 07:55

## 2024-01-01 RX ADMIN — OXYCODONE HYDROCHLORIDE 5 MG: 5 TABLET ORAL at 11:59

## 2024-01-01 RX ADMIN — Medication 5 ML: at 16:26

## 2024-01-01 RX ADMIN — POTASSIUM CHLORIDE 40 MEQ: 1500 TABLET, EXTENDED RELEASE ORAL at 05:59

## 2024-01-01 RX ADMIN — DEXAMETHASONE SODIUM PHOSPHATE: 10 INJECTION, SOLUTION INTRAMUSCULAR; INTRAVENOUS at 13:40

## 2024-01-01 RX ADMIN — LEVETIRACETAM 500 MG: 5 INJECTION INTRAVENOUS at 12:08

## 2024-01-01 RX ADMIN — Medication 5 ML: at 15:13

## 2024-01-01 RX ADMIN — POTASSIUM CHLORIDE 40 MEQ: 1500 TABLET, EXTENDED RELEASE ORAL at 15:59

## 2024-01-01 RX ADMIN — PEGFILGRASTIM 6 MG: KIT SUBCUTANEOUS at 13:31

## 2024-01-01 RX ADMIN — LEVETIRACETAM 500 MG: 5 INJECTION INTRAVENOUS at 11:47

## 2024-01-01 RX ADMIN — ONDANSETRON 4 MG: 2 INJECTION INTRAMUSCULAR; INTRAVENOUS at 10:25

## 2024-01-01 RX ADMIN — Medication 500 UNITS: at 12:51

## 2024-01-01 RX ADMIN — DENOSUMAB 120 MG: 120 INJECTION SUBCUTANEOUS at 12:04

## 2024-01-01 RX ADMIN — Medication 5 ML: at 14:10

## 2024-01-01 RX ADMIN — SODIUM CHLORIDE 1000 ML: 9 INJECTION, SOLUTION INTRAVENOUS at 10:50

## 2024-01-01 RX ADMIN — LEVETIRACETAM 1000 MG: 100 INJECTION, SOLUTION INTRAVENOUS at 12:41

## 2024-01-01 RX ADMIN — IOPAMIDOL 55 ML: 755 INJECTION, SOLUTION INTRAVENOUS at 15:03

## 2024-01-01 RX ADMIN — POTASSIUM CHLORIDE 20 MEQ: 1500 TABLET, EXTENDED RELEASE ORAL at 19:12

## 2024-01-01 RX ADMIN — LEVETIRACETAM 500 MG: 5 INJECTION INTRAVENOUS at 23:22

## 2024-01-01 RX ADMIN — DOXORUBICIN HYDROCHLORIDE 60 MG: 2 INJECTION, SUSPENSION, LIPOSOMAL INTRAVENOUS at 14:10

## 2024-01-01 RX ADMIN — Medication 500 UNITS: at 10:06

## 2024-01-01 RX ADMIN — DOXORUBICIN HYDROCHLORIDE 60 MG: 2 INJECTION, SUSPENSION, LIPOSOMAL INTRAVENOUS at 12:27

## 2024-01-01 ASSESSMENT — PAIN SCALES - GENERAL
PAINLEVEL: NO PAIN (0)
PAINLEVEL: NO PAIN (0)
PAINLEVEL: MODERATE PAIN (5)
PAINLEVEL: MODERATE PAIN (4)
PAINLEVEL: NO PAIN (0)
PAINLEVEL: NO PAIN (0)
PAINLEVEL: SEVERE PAIN (6)

## 2024-01-01 ASSESSMENT — HEADACHE IMPACT TEST (HIT 6)
HOW OFTEN DO HEADACHES LIMIT YOUR DAILY ACTIVITIES: RARELY
HOW OFTEN DID HEADACHS LIMIT CONCENTRATION ON WORK OR DAILY ACTIVITY: RARELY
HIT6 TOTAL SCORE: 50
HOW OFTEN HAVE YOU FELT TOO TIRED TO WORK BECAUSE OF YOUR HEADACHES: NEVER
WHEN YOU HAVE HEADACHES HOW OFTEN IS THE PAIN SEVERE: VERY OFTEN
WHEN YOU HAVE A HEADACHE HOW OFTEN DO YOU WISH YOU COULD LIE DOWN: VERY OFTEN
HOW OFTEN HAVE YOU FELT FED UP OR IRRITATED BECAUSE OF YOUR HEADACHES: NEVER

## 2024-01-01 ASSESSMENT — ACTIVITIES OF DAILY LIVING (ADL)
ADLS_ACUITY_SCORE: 25
ADLS_ACUITY_SCORE: 37
ADLS_ACUITY_SCORE: 23
ADLS_ACUITY_SCORE: 39
ADLS_ACUITY_SCORE: 23
ADLS_ACUITY_SCORE: 23
ADLS_ACUITY_SCORE: 22
ADLS_ACUITY_SCORE: 23
ADLS_ACUITY_SCORE: 23
ADLS_ACUITY_SCORE: 35
ADLS_ACUITY_SCORE: 25
ADLS_ACUITY_SCORE: 23
ADLS_ACUITY_SCORE: 23
ADLS_ACUITY_SCORE: 25
ADLS_ACUITY_SCORE: 37
ADLS_ACUITY_SCORE: 23
ADLS_ACUITY_SCORE: 37
ADLS_ACUITY_SCORE: 37
ADLS_ACUITY_SCORE: 23
ADLS_ACUITY_SCORE: 22
ADLS_ACUITY_SCORE: 23
ADLS_ACUITY_SCORE: 22
ADLS_ACUITY_SCORE: 37
ADLS_ACUITY_SCORE: 25
ADLS_ACUITY_SCORE: 23
ADLS_ACUITY_SCORE: 25
ADLS_ACUITY_SCORE: 35
ADLS_ACUITY_SCORE: 37
ADLS_ACUITY_SCORE: 25
ADLS_ACUITY_SCORE: 37
ADLS_ACUITY_SCORE: 23
ADLS_ACUITY_SCORE: 22
ADLS_ACUITY_SCORE: 23
ADLS_ACUITY_SCORE: 23
ADLS_ACUITY_SCORE: 37
ADLS_ACUITY_SCORE: 39
ADLS_ACUITY_SCORE: 23
ADLS_ACUITY_SCORE: 37
ADLS_ACUITY_SCORE: 25
ADLS_ACUITY_SCORE: 23
ADLS_ACUITY_SCORE: 23
ADLS_ACUITY_SCORE: 37
ADLS_ACUITY_SCORE: 23
ADLS_ACUITY_SCORE: 23
ADLS_ACUITY_SCORE: 25
ADLS_ACUITY_SCORE: 23
ADLS_ACUITY_SCORE: 39
ADLS_ACUITY_SCORE: 25
ADLS_ACUITY_SCORE: 35
ADLS_ACUITY_SCORE: 23
ADLS_ACUITY_SCORE: 25
ADLS_ACUITY_SCORE: 37
ADLS_ACUITY_SCORE: 25
ADLS_ACUITY_SCORE: 37

## 2024-01-01 ASSESSMENT — MIGRAINE DISABILITY ASSESSMENT (MIDAS)
HOW OFTEN WERE SOCIAL ACTIVITIES MISSED DUE TO HEADACHES: 1
HOW MANY DAYS IN THE PAST 3 MONTHS HAVE YOU HAD A HEADACHE: 3
HOW MANY DAYS DID YOU NOT DO HOUSEWORK BECAUSE OF HEADACHES: 0
HOW MANY DAYS WAS HOUSEWORK PRODUCTIVITY CUT IN HALF DUE TO HEADACHES: 1
HOW MANY DAYS DID YOU MISS WORK OR SCHOOL BECAUSE OF HEADACHES: 0
HOW MANY DAYS WAS YOUR PRODUCTIVITY CUT IN HALF BECAUSE OF HEADACHES: 0
TOTAL SCORE: 2
ON A SCALE FROM 0-10 ON AVERAGE HOW PAINFUL WERE HEADACHES: 8

## 2024-01-01 ASSESSMENT — PATIENT HEALTH QUESTIONNAIRE - PHQ9
SUM OF ALL RESPONSES TO PHQ QUESTIONS 1-9: 6
10. IF YOU CHECKED OFF ANY PROBLEMS, HOW DIFFICULT HAVE THESE PROBLEMS MADE IT FOR YOU TO DO YOUR WORK, TAKE CARE OF THINGS AT HOME, OR GET ALONG WITH OTHER PEOPLE: SOMEWHAT DIFFICULT
SUM OF ALL RESPONSES TO PHQ QUESTIONS 1-9: 6

## 2024-01-02 NOTE — PROGRESS NOTES
Virtual Visit Details    Type of service:  Video Visit     Originating Location (pt. Location): Home    Distant Location (provider location):  Off-site  Platform used for Video Visit: [facility]    Palliative Care Outpatient Clinic Progress Note    Patient Name: Shaneka Alvarez is being evaluated via a billable video visit.    Primary Provider:  [facility]  Primary Oncologist: Dr Zepeda  Last seen by palliative care: myself, 10/25/23      Chief Complaint: feeling nauseous today    Background/Summary  Medical:  - breast cancer ER+/[place]+, Her2 neg diagnosed in 2006. 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 Didn't tolerate Xeloda, paclitaxel Nov 2019-Jan 2020, treated with eribulin and zometa. PD seen on PET Nov 2020 as well as a new L 4th rib concerning for a pathologic fracture. Started Trodelvy 11/11/20, PD Dec 21, switched to casodex.               - PD again on PET/CT March 2022 including b/l iliac crest lesions, R clavicle, and multifocal T spine involvement. Switched to single agent Doxil April 2022, dose decrease to 80% at 2nd cycle in light of side effects. Tolerated that well. Complete response on scan Oct 2022, apparent slow progression based on rising . Ongoing observation on single-agent Doxil, PET Nov 23 stable  - b/l PE found on PET July 2020, on anticoagulation  - MS with mild right-sided weakness    - long-standing history of migraines, follows with neurology. On monthly aimovig with ubrelvy prn  - SI joint pain, likely largely degenerative, but patient also has metastatic disease in that area. Recently completed appointments with PT, still doing exercises at home, injection by pain clinic Oct 23 without relief. Some relief with  "cannabis products, NSAIDs not effective  - post-zoster neuralgia (ED Visit July 2022), completely resolved         Social  She lives with her    6 adult children, 1 lives locally  Middle-, currently not working. She misses it, but also realizes that many days she wouldn't be able to go to work.      She visited both her kids in [place] in the past month and is planning a trip to [place] next week     Psychospiritual  Has been through traumatic experiences in the past. Addressed these with counselors prior to her diagnosis. Doing quite well now.      She has a very close friend who is also living with incurable breast cancer. They have good conversation and are important support to each other.      Care Planning   She is aware of the presumed gradual progression of her cancer and that she likely will have to move on from her current treatment plan in the near future. This is \"worrisome\" to her and she also trusts that there will indeed be another option after this one.   She is able to live with this knowledge without getting too anxious or worried, sleeps mostly ok.      POLST completed 4/1/20: DNR/selective treatments     Opioid Safety   Expected prognosis: > 1 year  Risk: Low (ORT 0)  Indication for Opioid Use: Moderate or Strong  Naloxone Script provided if patient also on benzodiazepine: yes  Indications for Tapering reviewed: rarely uses morphine     : reviewed, ok    Interim History:  At the last visit we discussed pain (resumed morphine)    Patient is on the call by herself  History gathered today from: patient, medical chart    She has intermittent days when she feels quite nauseous. Zofran usually helps. This is unpredictable, happens once/twice a week.     Her pain remains significant, improved relief with belbuca 450mcg bid plus a dose of morphine 30mg most evenings. It is difficult for Shaneka to tell whether she needs the morphine due to the am belbuca wearing off or due to being more " active during the day.     Impression & Recommendations & Counseliny/o woman with metastatic breast cancer on single-agent doxil. PENG on imaging, but rising tumor marker. Course c/b SI joint pain, likely not directly related to cancer    SI join pain: chronic, only moderate improvement with PT, none with injections. Now adequately controlled with belbuca 450mcg bid plus morphine 30mg in the evenings.   - increase am belbuca to 600mcg    - will return to 450mcg bid dosing if still requires morphine dose, which could suggest end of dose failure. Pt prefers to avoid tid dosing    Nausea: several times a week, responds well to ondansetron  - no changes today      Return to clinic in 2-3 months    Data / Chart Review:    Review of Systems:   ROS: 10 point ROS neg other than the symptoms noted above in the HPI and pertinents here.         Physical Exam:   Physical Exam:  Vital Signs not checked, virtual visit    CONSTIT: awake, appears comfortable  EENT: MMM, EOMI, no icterus  RESP: reg, nl effort, no cough  SKIN: no rash, no obvious lesions  NEURO: alert, oriented x3  PSYCH: appropriate affect, memory and thought process intact    The rest of a comprehensive physical examination is deferred  due to public health emergency video visit restrictions.      Current pertinent medications:  Belbuca 450mg q12h  MSIR 15-30mg q3h prn               Naloxone prescribed  acetaminophen 1000mg tid     Dexamethasone 4mg q12h     Venlafaxine 225mg daily  Lorazepam 0.5-1mg q6h prn   Trazodone 50mg HS  Buspirone 15mg bid     Olanzapine prn nausea      No pertinent allergies      Lab and imaging data reviewed:  Comments:       Alva Whitaker MD  Palliative Medicine

## 2024-01-03 NOTE — LETTER
1/3/2024         RE: Shaneka Alvarez  [address] [facility] [address]        Dear Colleague,    Thank you for referring your patient, Shaneka Alvarez, to the [facility]. Please see a copy of my visit note below.    Virtual Visit Details    Type of service:  Video Visit     Originating Location (pt. Location): Home    Distant Location (provider location):  Off-site  Platform used for Video Visit: [facility]    Palliative Care Outpatient Clinic Progress Note    Patient Name: Shaneka Alvarez is being evaluated via a billable video visit.    Primary Provider:  [facility]  Primary Oncologist: Dr Zepeda  Last seen by palliative care: myself, 10/25/23      Chief Complaint: feeling nauseous today    Background/Summary  Medical:  - breast cancer ER+/NH+, Her2 neg diagnosed in 2006. 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 Didn't tolerate Xeloda, paclitaxel Nov 2019-Jan 2020, treated with eribulin and zometa. PD seen on PET Nov 2020 as well as a new L 4th rib concerning for a pathologic fracture. Started Trodelvy 11/11/20, PD Dec 21, switched to casodex.               - PD again on PET/CT March 2022 including b/l iliac crest lesions, R clavicle, and multifocal T spine involvement. Switched to single agent Doxil April 2022, dose decrease to 80% at 2nd cycle in light of side effects. Tolerated that well. Complete response on scan Oct 2022, apparent slow progression based on rising . Ongoing observation on single-agent Doxil, PET Nov 23 stable  - b/l PE found on PET July 2020, on anticoagulation  - MS with mild right-sided weakness    - long-standing history of migraines, follows with neurology. On monthly  "aimovig with ubrelvy prn  - SI joint pain, likely largely degenerative, but patient also has metastatic disease in that area. Recently completed appointments with PT, still doing exercises at home, injection by pain clinic Oct 23 without relief. Some relief with cannabis products, NSAIDs not effective  - post-zoster neuralgia (ED Visit July 2022), completely resolved         Social  She lives with her    6 adult children, 1 lives locally  Middle-, currently not working. She misses it, but also realizes that many days she wouldn't be able to go to work.      She visited both her kids in [place] in the past month and is planning a trip to [place] next week     Psychospiritual  Has been through traumatic experiences in the past. Addressed these with counselors prior to her diagnosis. Doing quite well now.      She has a very close friend who is also living with incurable breast cancer. They have good conversation and are important support to each other.      Care Planning   She is aware of the presumed gradual progression of her cancer and that she likely will have to move on from her current treatment plan in the near future. This is \"worrisome\" to her and she also trusts that there will indeed be another option after this one.   She is able to live with this knowledge without getting too anxious or worried, sleeps mostly ok.      POLST completed 4/1/20: DNR/selective treatments     Opioid Safety   Expected prognosis: > 1 year  Risk: Low (ORT 0)  Indication for Opioid Use: Moderate or Strong  Naloxone Script provided if patient also on benzodiazepine: yes  Indications for Tapering reviewed: rarely uses morphine     : reviewed, ok    Interim History:  At the last visit we discussed pain (resumed morphine)    Patient is on the call by herself  History gathered today from: patient, medical chart    She has intermittent days when she feels quite nauseous. Zofran usually helps. This is unpredictable, " happens once/twice a week.     Her pain remains significant, improved relief with belbuca 450mcg bid plus a dose of morphine 30mg most evenings. It is difficult for Shaneka to tell whether she needs the morphine due to the am belbuca wearing off or due to being more active during the day.     Impression & Recommendations & Counseliny/o woman with metastatic breast cancer on single-agent doxil. PENG on imaging, but rising tumor marker. Course c/b SI joint pain, likely not directly related to cancer    SI join pain: chronic, only moderate improvement with PT, none with injections. Now adequately controlled with belbuca 450mcg bid plus morphine 30mg in the evenings.   - increase am belbuca to 600mcg    - will return to 450mcg bid dosing if still requires morphine dose, which could suggest end of dose failure. Pt prefers to avoid tid dosing    Nausea: several times a week, responds well to ondansetron  - no changes today      Return to clinic in 2-3 months    Data / Chart Review:    Review of Systems:   ROS: 10 point ROS neg other than the symptoms noted above in the HPI and pertinents here.         Physical Exam:   Physical Exam:  Vital Signs not checked, virtual visit    CONSTIT: awake, appears comfortable  EENT: MMM, EOMI, no icterus  RESP: reg, nl effort, no cough  SKIN: no rash, no obvious lesions  NEURO: alert, oriented x3  PSYCH: appropriate affect, memory and thought process intact    The rest of a comprehensive physical examination is deferred  due to public health emergency video visit restrictions.      Current pertinent medications:  Belbuca 450mg q12h  MSIR 15-30mg q3h prn               Naloxone prescribed  acetaminophen 1000mg tid     Dexamethasone 4mg q12h     Venlafaxine 225mg daily  Lorazepam 0.5-1mg q6h prn   Trazodone 50mg HS  Buspirone 15mg bid     Olanzapine prn nausea      No pertinent allergies      Lab and imaging data reviewed:  Comments:       Alva Whitaker MD  Palliative  Medicine      Again, thank you for allowing me to participate in the care of your patient.        Sincerely,        Alva Whitaker MD

## 2024-01-03 NOTE — NURSING NOTE
Is the patient currently in the state of [place]? YES    Visit mode:VIDEO    If the visit is dropped, the patient can be reconnected by: VIDEO VISIT: Text to cell phone:   Telephone Information:   Mobile [phone]       Will anyone else be joining the visit? NO  (If patient encounters technical issues they should call [phone]    How would you like to obtain your AVS? MyChart    Are changes needed to the allergy or medication list? Pt stated no med changes    Reason for visit: RECHECK    No other vitals to report per pt    Lacey MILLERF

## 2024-01-03 NOTE — TELEPHONE ENCOUNTER
Central Prior Authorization Team   Phone: [phone]    PA Initiation    Medication: BELBUCA 600 MCG BU FILM  Insurance Company: [employer] ([facility]) - Phone [phone] Fax [phone]  Pharmacy Filling the Rx: [facility] [address] [facility]  Filling Pharmacy Phone: [phone]  Filling Pharmacy Fax:    Start Date: 1/3/2024

## 2024-01-05 NOTE — TELEPHONE ENCOUNTER
I spoke to several reps at hospitals who continued to transfer me. The call ended with someone named Cristal who repeatedly told me that patient can fill 30 films/30 days. It appears she has 2 claims bumping up against each other due to that limit.  I attempted to get her to create a new PA for the increased strength and she repeatedly told me to initiate it through [facility], even though I already did that and they closed it. I asked her what the total daily limit is of this medication and she couldn't give me an answer. I repeatedly asked for a supervisor and she said she would have a supervisor call me back. She said I was disprespecting her and she hung up on me.   I did speak to [facility], Mohit, who told me he was able to get a paid claim for the 600 mcg, but it is too soon to fill the 450 mcg. He will have to find out next week if those claims are still bumping each other or if he can get a paid claim. If there are further issues with the quantity of the medication, he will let us know.     Prior Authorization Not Needed per Insurance    Medication: BELBUCA 600 MCG BU FILM  Insurance Company: [employer] ([facility]) - Phone [phone] Fax [phone]  Expected CoPay: $    Pharmacy Filling the Rx: [facility] [address] [place] [id number] [facility]  Pharmacy Notified: Yes  Patient Notified:

## 2024-01-09 NOTE — PROGRESS NOTES
Virtual Visit Details    Type of service:  Video Visit     Originating Location (pt. Location): Home    Distant Location (provider location):  Off-site  Platform used for Video Visit: [facility]  Video start time. 10:12 AM  Video stop time. 10:20 AM

## 2024-01-09 NOTE — NURSING NOTE
Is the patient currently in the state of [place]? YES    Visit mode:VIDEO    If the visit is dropped, the patient can be reconnected by: VIDEO VISIT: Text to cell phone:   Telephone Information:   Mobile [phone]       Will anyone else be joining the visit? NO  (If patient encounters technical issues they should call [phone]    How would you like to obtain your AVS? MyChart    Are changes needed to the allergy or medication list? No    Reason for visit: RECHECK    Idalia IGLESIAS

## 2024-01-09 NOTE — LETTER
1/9/2024         RE: Shaneka Alvarez  [address] [facility] [address]        Dear Colleague,    Thank you for referring your patient, Shaneka Alvarez, to the [facility]. Please see a copy of my visit note below.    ONCOLOGY FOLLOW UP NOTE: 1/09/24        I took the history from reviewing the previous notes that she was following with Dr. Amaya.  I have copied and updated from prior notes.    ONCOLOGY History:    1.  January 2006:  Diagnosed with Stage IIB, T2 N1 M0 invasive lobular carcinoma of the right breast.  Final pathology showed a 4.5 x 3.8 x 2.5 cm, 01/14 lymph nodes positive.  Estrogen, progesterone receptor positive, HER-2 negative.     2.  Genetics.  BRCA1 and 2 mutations not detected. 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       THERAPY TO DATE:   1. 2006:  ECOG 2104 protocol of dose-dense Adriamycin, Cytoxan and Avastin x4 cycles followed by Taxol and Avastin x4 cycles.   2.  03/2007:  Completed 1 year Avastin.   3.  11/2006-01/2007:  Radiotherapy to the right breast of 5040 cGy.   4.  03/20070053-8661:  Aromasin.  Stopped after moving to the [facility].   5.  01/2016:  Right modified radical mastectomy with latissimus dorsi flap reconstruction and a left prophylactic mastectomy with latissimus dorsi flap reconstruction.     She recently had MRI of the brain as a follow-up for multiple sclerosis and there were multiple calvarial lesions noted which was suspicious for metastatic disease.  There was no evidence of new multiple sclerosis lesions.  She had a PET scan on 8/30/2019 which showed widespread bone metastatic lesions (calvarium, spine, sacrum, pelvis, ribs most prominent at C7, T3, L1 and L4), hypermetabolic 3 cm lesion in LLL, and mediastinal/hilar LN. CEA wnl at 1.9 and C27-29 elevated to 415 (28 on 8/23/16). A CT guided biopsy of the right iliac bone was obtained on 9/4/19, pathology consistent with metastatic adenocarcinoma (breast), ER/[place]  negative, HER-2 negative PDL 1 < 1%. A second biopsy was take of the LLL nodule via EBUS on 9/5/19 did not show any malignancy.    C/T/L spine MRI 8/3/19 to 9/2/19 with numerous enhancing lesions of the C, T and L spine, largest around T9 and L1. No evidence of cord compression. Has a L1 compression fracture and impending L4.     Received radiation T12-L5 3000 cGy in 10 fractions from 9/6/2019 till 9/18/2019.  Neurosurgery recommended no surgical intervention but to wear Sagamore brace when out of bed and HOB >30 degrees    She started palliative Xeloda 2000/1500 on 10/1/2019 but after just a few doses she noticed nausea, red eyes blurred vision, loss of appetite.  She stopped taking it after 5 doses and was seen by nurse practitioner on 10/7/2019 when she was improving.  At that point she was restarted on Xeloda at a lower dose of 1000 mg twice a day.  As she was not able to tolerate even the lower dose with extreme nausea and vomiting and feeling extremely fatigued and blurry vision, we decided on stopping Xeloda.    We decided on repeating scans and MRI brain on 10/25/2019 showed no intracranial metastatic disease.   Multiple enhancing calvarial lesions may be increased in number and are suggestive of metastatic disease. Thinner imaging on the current study may identify the smaller lesions and may be responsible for  identified more lesions.   There is a single focus of T2 hyperintense signal within the anterior right temporal lobe may represent interval demyelination. There is no evidence for active demyelination.    PET/CT on 11/1/2019 showed favorable response to therapy and Overall FDG uptake within scattered osseous metastases is decreased since 8/30/2019, particularly within the pelvis. Increased sclerotic appearance of L1 and L4 pathologic compression deformities, likely sequela of recently completed palliative radiation therapy.    No significant FDG uptake in previously demonstrated hypermetabolic  mediastinal lymph nodes. Biopsy negative on 9/5/2019.  There is also decreased FDG uptake in the left lower lobe (biopsy negative for  malignancy), now with dense consolidation containing air bronchograms suggestive of infection versus aspiration.  There is diffuse FDG uptake within the esophagus, consistent with esophagitis.    Because of intolerance to Xeloda we decided on switching to weekly paclitaxel on 11/5/2019.    C#2 Taxol 12/4/19- started with 70mg/m2 dose reduction    C#3 Taxol 12/30/2019     PET/CT on 1/24/2020 showed progression of the disease in some of the bone lesions including increase in size and lytic lesion within the vertebral body of C4 measuring 1 cm as opposed to 0.6 cm previously and a new central soft tissue nodule with SUV max 4.1 when previously it was non-hypermetabolic.  There is also some progression noted in the right proximal femur and right iliac bone.  There is decreased metabolic uptake in the right lamina of T9 with SUV max 4.7 when previously it was 8.0.  Other lesions are stable.    CA 27-29 also had increased significantly and it was 257 on 1/28/2020.    We decided on switching to eribulin on 1/28/2020.    C#2 2/18/2020  C#2 D#8 2/25/2020    C#3 D#1 3/18/2020 -delayed by 1 week as per patient's preference because she wanted to visit her family.    She had a repeat PET/CT on 3/27/2020 which showed stable size of the bone metastatic lesions although the FDG avidity was less, consistent with treatment response.    She had MRI of the thoracic spine on 4/3/2020 and it was compared to the one which was done in August 2019 and it showed increased size of several metastatic lesions most notably at T9 but also at T5-T7.  Other lesions at T10, T11 and T12 appeared improved.    CA 27-29 was also down to 222 on 3/18/2020.    Cycle #4 eribulin ( dose reduced to 1.2 mg/m2 )-4/7/2020-   Cycle #5 Eribulin ( 1.2 mg/m2 )- 4/28/2020   Cycle #6 Eribulin ( 1.2 mg/m2 )- 5/19/2020     Cycle #7  Eribulin ( 1.2 mg/m2 )- 6/9/2020    Cycle #8 Eribulin ( 1.2 mg/m2 )- 6/30/2020     She had a repeat PET scan on 7/17/2020 which showed incidental finding of new acute bilateral pulmonary embolism in the distal left main pulmonary artery extending in the left upper and lower lobes and in the segmental and branch arteries in the right lower lobe.    It also showed mildly increased FDG avidity in the lytic lesion in the T9 lamina and right pedicle with SUV max 5.3, previously 3.9.  That is also slightly increased FDG uptake to the left anterior fifth rib at the costochondral junction SUV max 3.9, previously 2.5.  There is slightly decreased FDG uptake in the right femoral neck lesion SUV max 2.2, previously 2.9.  FDG uptake in other bone lesions is fairly stable.  No new metastasis are seen.    CA 27-29 was 308 on 6/30/2020.  It was 286 on 5/19/2020.    Overall this is consistent with only slight progression versus stable disease.    She was started on Lovenox.    Cycle #9 eribulin 7/21/2020. CA 27-29 was 238    C#10 eribulin 8/18/2020. ( delayed by one week for ANC 0.9 )    C#11 Eribulin 9/8/2020    C#12 Eribulin 9/29/2020     C#13 Eribulin 10/20/2020     PET scan on 11/6/2020 shows progression of the disease with increased FDG uptake in the lytic lesions in the T9/T10 and right posterolateral elements as well as increased FDG uptake in the previously known hypermetabolic right iliac bone lesion suggesting recurrence of previously treated metastasis.  There is new subtle lesion in the right manubrium.  There is also a new fracture in the anterolateral left fourth rib with associated uptake and this could be a pathologic fracture.  Healing fracture in the left anterior fifth rib lesion.  There is resolution of the left pulmonary emboli.  Decreased FDG uptake of the esophagus consistent with improving inflammation.    CA 27-29 on 10/20/2020 was also elevated at 489.    C#1 Trodelvy on 11/11/2020.  Cycle #2 Trodelvy  12/2/2020  Cycle number 2-day #8 Trodelvy 12/8/2020.    12/15/2020-12/16/2020.  She was admitted to the hospital with nausea vomiting and dehydration.  She had tachycardia and initial lactic acid of 5.  It decreased to 1.4 after 2 L of normal saline.  She also had hypokalemia and ANC was 1.3.  She was treated with IV fluids.  Procalcitonin was negative.  CTA of the chest was negative for pulmonary embolism or pneumonia.  No bacterial infection was documented.  Her medication which she was initially unable to tolerate at home, were restarted and she was discharged home once started to feel better.      We delayed the start of cycle number 3 x 1 week and decrease the dose of chemotherapy by 25%.  She also had neutropenia with ANC of 1.0.      She started cycle number 3-day #1 on 12/29/2020.  CA 27-29 was 392.    Cycle number 3-day #8 1/5/2021.    C#4 Trodelvy 1/20/2021- 75% dose    2/5/2021.  PET/CT overall shows a good response to treatment with improvement of previously hypermetabolic lesions in the spine and pelvis and stability of other bone meta stasis.  There is a single lytic lesion in the right iliac bone which demonstrates slight Yimi increased FDG uptake and has SUV max 4.7 while previously it was SUV max 3.4.  There was diffuse wall thickening of the esophagus with uptake in the esophagus in the fundus of the stomach and this could be seen with inflammation.    Trodelvy cycle #5 - 2/10/2021.  75% of the dose.  CA 27-29 was 337.    Trodelvy cycle #6 - 3/3/2021.  75% of the dose.  Trodelvy cycle #6, D#8 - 3/10/2021.  75% of the dose.    Trodelvy C#8, D#8 on 4/21/2021  CA 27-29 stable at 371 on 4/14/2021    Trodelvy, cycle #9- 5/5/2021        On 2/25/2020 when she had biopsy of the right acetabulum and it was consistent with metastatic lobular carcinoma.  Again it was Triple Negative.     On 3/18/2020 when she also met with Dr. Arelis Arshad who also advised testing for androgen receptor studies on the biopsy  specimen.  Tumor was diffusely androgen receptor positive.      5/26/2021-cycle #10 Trodelvy     CA 27-29 was 545.    6/11/2021.  PET/CT shows mildly increased uptake in several bone lesions for example in the left anterior iliac crest, mid right iliac crest and left ischium.  Uptake in other bone lesions are similar to previously.    Because of minimal progression of the disease and she is tolerating chemotherapy very well, we decided on continuing the same chemotherapy.    6/16/2021-Trodelvy cycle #11    7/7/2021 -Trodelvy cycle #12    9/14/2021-Trodelvy-cycle #15, day #8.    9/8/2021-CA 27-29 was more elevated and it was 1176.    PET/CT on 9/24/2021 showed stable findings with no evidence of FDG avid metastatic disease within the body.  Left axillary lymph nodes are prominent and hypermetabolic and likely from recent vaccinations in the left deltoid muscle.  Healed bony metastasis seems stable.      Cycle #16, Trodelvy 9/28/2021.  Cycle #17, day #8 Trodelvy was on 10/26/2021.  Cycle #18, day #8 Trodelvy on 11/22/2021       She saw Dr. Mejai whom on 10/6/2021.  She was not considered eligible for Enfortumab trial.    Cycle #19, Trodelvy on 12/7/2021 12/21/2021.  PET/CT showed progression of bony metastatic disease.  There is increase hypermetabolism in both the right and left iliac bones and the sternum.  Other bone lesions are stable.    There is new scattered areas of groundglass throughout both the lungs and these findings are indeterminate but may represent an infectious etiology.  Interval resolution of left axillary FDG avid lymphadenopathy.    She was started on Casodex 150 mg daily on 1/6/2022.    She was admitted to the hospital from 3/17/2022-3/19/2022 for vomiting and diarrhea and  severe back pain.  I reviewed the discharge summary.  CT lumbar spine showed a stable severe chronic L1 pathologic compression fracture.  Stable mild compression deformity of superior endplate of L3 and superior endplate  of L4.  The upper margin of the L4 fracture extends slightly into the spinal canal without high-grade canal stenosis and this is also stable.  Nondisplaced fractures coursing through the L3 pedicles bilaterally were seen which were not clearly seen previously this could be acute and likely pathologic.  No extraspinal soft tissue abnormality.  Neurosurgery recommended conservative management.  Her pain was controlled and she was discharged home as she felt better with this.      3/31/2022-PET/CT shows progressive bone meta stasis with progressive FDG uptake in the following lesions. Left iliac crest lesion with max SUV of 7.2, previously 3.5. Right mid iliac crest lytic lesion shows maximum SUV of 7.8, previously 6.9.  T10 vertebral body mixed lytic and sclerotic lesion with an SUV of 7.1, previously not hypermetabolic. Thoracic vertebral bodies 8, 7, 5, and 4 also show hypermetabolic lesions were previously there was no hypermetabolism.  Some of the sclerotic osseous lesions are unchanged and do not show increased hypermetabolism compatible treated lesion such as a severe compression deformity at L1 as well as superior compression deformity at L4.  Medial right clavicle sclerotic lesion with SUV max of 4.8, previously not hypermetabolic. There is also right-sided sternal lesions.      4/14/2022--C#1- switch to single agent Doxil 50 mg per metered squared every 4 weeks.    5/13/2022-cycle #2- Doxil- dose reduced to 40 mg per metered square due to severe nausea/vomiting and migraines requiring IV fluids and IV antiemetics.    6/10/2022-cycle #3-Doxil 40 mg per metered square    7/1/2022- PET/CT shows resolution of the hypermetabolic skeletal metastasis.    7/6/2022-cycle #4-Doxil 40 mg per metered square  8/5/2022- cycle #5-Doxil  9/1/2022.  Cycle #6-Doxil  9/30/2022- cycle #7-Doxil    10/18/2022.  PET/CT does not show evidence of any FDG activity    11/2/2022-cycle #8-Doxil.  11/30/2022-cycle  #9-Doxil.  12/28/2022-cycle #10-Doxil    2/3/2023- C#11- Doxil (delayed by 1 week because neutrophil count was 0.9)-wanted to give her Onpro but at that time it was not approved by insurance.    3/3/2023.  Cycle #12 Doxil.  Given with Onpro.    Repeat PET/CT on 3/27/2023 overall showed very stable findings.  PET/CT showed focal FDG avidity in the heart. This is in an expected location of AV node or mitral valve.  This is nonspecific.  We checked an echocardiogram on 4/20/2023 which was unremarkable.  She is asymptomatic.  Continue to monitor clinically.      3/31/2023.  Cycle #13 Doxil.  Given with Onpro.     4/12/2023-she presented to ED with increased migraine headaches and nausea and vomiting.  She was treated symptomatically and was discharged home after she started to feel better.      4/28/2023.  Cycle #14 Doxil.  Given with Onpro    Cycle #16 Doxil with Onpro 6/23/2023    Cycle #17 Doxil with Onpro 7/21/2023      CA 15-3 has been slowly rising.    Repeat PET/CT on 8/8/2023 showed pretty stable findings.    She has been having more pain in the lumbar spine/low back.    8/18/2023.  Cycle #18 Doxil with Onpro    9/15/2023.  Cycle #19 Doxil with Onpro.    10/16/2023.  Cycle #20 Doxil with Onpro.    11/10/2023.  PET/CT overall showed pretty stable findings.    11/15/2023.  Cycle #21 Doxil with Onpro    12/13/2023.  Cycle #22 Doxil with Onpro     1/10/2024.  Cycle #23 Doxil with Onpro is planned.      Interval history.  This is a video visit.  I also reviewed notes from Dr. Whitaker from palliative care.      Currently taking Belbuca 600 mcg in the morning and 450 mcg in the evening.  She takes morphine as needed.  On average she takes 1 morphine at night.  With this her pain is under fair control.  She takes calcium and vitamin D.  Overall she is tolerating chemotherapy well but she feels more fatigued.  She has occasional nausea and vomiting.  She takes Zofran which helps.  Denies diarrhea or constipation.  No new  pain and no new swellings.  No fevers or infections or shortness of breath.  Recently her father-in-law and one of her friends passed away so she feels sad about the recent events.         ECOG 1    ROS:  Rest of the comprehensive review of the system was negative.        I reviewed other history in epic as below.       PAST MEDICAL HISTORY:     1.  Breast cancer  2.  Multiple sclerosis.   3.  Depression.   4.  Migraines.   5.  Hypertension.   6.  Cholecystectomy and umbilical hernia repair.     SOCIAL HISTORY: She smoked for 5 years but quit many years ago.  Drinks alcohol socially.  She lives with her .  She is a teacher in middle school.       FAMILY HISTORY: She mentions that her mother had breast cancer at 49.  Both grandmothers had breast cancer but she is not sure of the age.  A couple of cousins have cancers.  Patient has 3 children who are healthy.    Current Outpatient Medications   Medication     traZODone (DESYREL) 50 MG tablet     apixaban ANTICOAGULANT (ELIQUIS ANTICOAGULANT) 5 MG tablet     Buprenorphine HCl (BELBUCA) 450 MCG FILM buccal film     Buprenorphine HCl (BELBUCA) 600 MCG FILM buccal film     busPIRone (BUSPAR) 15 MG tablet     calcium citrate-vitamin D (CITRACAL) 315-250 MG-UNIT TABS     Cholecalciferol (VITAMIN D3 PO)     galcanezumab-gnlm (EMGALITY) 120 MG/ML injection     LORazepam (ATIVAN) 0.5 MG tablet     metoclopramide (REGLAN) 5 MG tablet     morphine (MSIR) 15 MG IR tablet     OLANZapine (ZYPREXA) 5 MG tablet     ondansetron (ZOFRAN ODT) 4 MG ODT tab     propranolol ER (INDERAL LA) 80 MG 24 hr capsule     propranolol ER (INDERAL LA) 80 MG 24 hr capsule     ubrogepant (UBRELVY) 100 MG tablet     venlafaxine (EFFEXOR XR) 75 MG 24 hr capsule     vitamin B-2 (RIBOFLAVIN) 25 MG TABS tablet     ZOLMitriptan (ZOMIG) 5 MG nasal spray     No current facility-administered medications for this visit.     Facility-Administered Medications Ordered in Other Visits   Medication     heparin  100 unit/mL injection 5 mL     sodium chloride (PF) 0.9% PF flush 10 mL        Allergies   Allergen Reactions     Bactrim [Sulfamethoxazole W/Trimethoprim]      Internal bleeding     Copaxone [Glatiramer] Hives          PHYSICAL EXAMINATION:     There were no vitals taken for this visit.  Wt Readings from Last 4 Encounters:   01/03/24 54.4 kg (120 lb)   12/13/23 56.5 kg (124 lb 8 oz)   12/05/23 53.5 kg (118 lb)   11/15/23 57.2 kg (126 lb)             Constitutional.  Does not seem to be in any acute distress.  Eyes.  No redness or discharge noted.  Respiratory.  Speaking in full sentences.  Breathing seems comfortable without any accessory use of muscles.    Skin.  Visualized his skin does not show any obvious rashes.  Musculoskeletal.  Range of motion for visualized areas is intact.  Neurological.  Alert and oriented x3.  Psychiatric.  Mood, mentation and affect are normal.  Decision making capacity is intact.      The rest of a comprehensive physical examination is deferred due to Public Health Emergency video visit restrictions.      Labs and Imaging.    Reviewed.    12/13/2023  CBC shows WBC 6.7.  Hemoglobin 12.1.  Platelets 193.     CMP shows calcium 8.6.      CA 15-3 was 408 on 12/13/2023    CA 15-3 was 287 on 11/15/2023    CA 15-3 was 237 on 10/16/2023    CA 15-3 was 242 on 9/15/2023    CA 15-3 was 193 on 8/18/2023 7/21/2023.    CA 15-3 was 186.    7/3/2023    CA 15-3 was 177 on 6/23/2023      CA 15-3 was 129 on 3/31/2023      CA 15-3 was 123 on 3/3/2023.      11/30/2022     CA 15-3 was 135.    9/30/2022  CA 15-3 was 159.  CA 27-29 was 1992 on 6/10/2022  CA 27-29 was 3370 on 5/13/2022.  It was 5307 on 4/14/2022 ferritin Doxil was started.      9/28/2021.      Iron studies, ferritin is 101, iron 51, TIBC 268 and iron saturation index 19%.        PET/CT on 11/10/2023  COMPARISON: PET 8/8/2023        FINDINGS:      HEAD/NECK:  There is no suspicious FDG uptake in the neck.     The paranasal sinuses are  clear. The mastoid air cells are clear.      No hypermetabolic lymph nodes are demonstrated in the neck.      The mucosal and deep spaces of the neck are unremarkable. The major  salivary glands are unremarkable. The thyroid is unremarkable. The  major vasculature of the neck are patent.     CHEST:  Bilateral mastectomies with breast implants.  There is no suspicious FDG uptake in the chest.     The central tracheobronchial tree is clear. No pleural effusion or  pneumothorax. No acute consolidation.      No hypermetabolic lymph nodes are demonstrated in the chest.     Heart size is within normal limits. No pericardial effusion. The  thoracic aorta and main pulmonary artery are within normal limits for  diameter. The esophagus is unremarkable.      ABDOMEN AND PELVIS:  There is no suspicious FDG uptake in the abdomen or pelvis.     The liver is unremarkable. Cholecystectomy clips. No intrahepatic or  extrahepatic biliary ductal dilatation. The pancreas is unremarkable.  No pancreatic ductal dilatation. The spleen is unremarkable. The  adrenal glands are unremarkable.     Symmetric enhancement of the kidneys. No hydronephrosis. The urinary  bladder is unremarkable. Uterus is unremarkable. Ovaries are absent.  No hypermetabolic lymph nodes are demonstrated in the abdomen or  pelvis.     Normal caliber of the small and large bowel. No free air, free fluid,  or fluid collection. Normal caliber of the abdominal aorta.     LOWER EXTREMITIES:   There is no suspicious FDG uptake in the visualized lower extremities.     BONES: Again demonstrated are numerous sclerotic and lytic lesions  some of which may remains mildly hypermetabolic. FDG uptake in bone  metastases are not substantially changed, with a few lesions showing  mild FDG increases, others decreased, and others not significantly  changed. Examples include:     Decreased FDG:    T8 vertebral body max SUV 3.1 (prior 4.3).  T9 left posterior lytic lesion max SUV 3.1  (prior 3.4)     Increased FDG:   A left ninth rib costovertebral chondral junction max SUV 3 (prior  2.6.)  Right sixth rib anteriorly max SUV 3.8 prior 2.7.     Not significantly changed:  Right second rib anterolateral focus of hypermetabolism max SUV 3.9,  not significantly changed from prior.   Right iliac bone max SUV 3.6 (prior 3.9).     Multiple old right rib fractures. Left 10th rib the deformity with  callus. A severe compression deformity of L1 unchanged.                                                                      IMPRESSION: In this patient with history of breast cancer:  Overall  stable disease.  Again demonstrated are numerous metastatic bone lesions, some of which  have mildly elevated FDG activity. Some have increased slightly,  others have decreased slightly,  others are not significantly changed.      CT lumbar spine on 8/28/2023  IMPRESSION:  1.  No evidence for acute displaced fracture. Fractures of the L1, L3 and L4 vertebral bodies appear stable from 07/31/2023.  2.  No evidence of traumatic subluxation.  3.  Diffuse osseous metastatic disease.    MRI of the lumbar spine on 7/31/2023  COMPARISON: PET/CT 03/27/2023.  TECHNIQUE: MRI Lumbar Spine without IV contrast.     FINDINGS: Examination is mildly limited by patient motion artifact.     Alignment is unchanged. Redemonstrated heterogeneous bone marrow signal throughout the visualized lumbosacral spine including numerous focal abnormal T1 hypointense and STIR hyperintense marrow replacing lesions, including confluent lesions at T12 and S2   vertebrae. Similar chronic L1 pathologic burst fracture with mild superior endplate retropulsion and severe anterior-central vertebral body height loss. Likewise, similar chronic L4 superior endplate fracture with associated mild retropulsion. Large   Schmorl's node at the L2 inferior endplate, as before. Vertical defects in the bilateral L3 pedicles are redemonstrated compatible with chronic  pathologic fractures. Asymmetric STIR hyperintense signal abnormality in the left sacral ala partially   visualized, which may represent additional confluent pathologic marrow replacement. Otherwise, remaining lumbar vertebral body heights are maintained. Conus signal is normal and terminates at L2. No perivertebral soft tissue edema. There is mild edema   within the bilateral multifidus and erector spinae muscles, which may indicate posttraumatic injury/strain. Superficial subcutaneous soft tissue edema may be posttraumatic or related to positioning. Prominence of bile duct may be related to   postcholecystectomy physiologic ectasia. Small/subcentimeter right renal cyst. Last fully formed disc is designated L5-S1. Multilevel disc degeneration and height loss, severe at L5-S1.     T12-L1: No disc without bulge or protrusion. Facet joints are normal. Mild L1 superior endplate retropulsion indents the ventral thecal sac without spinal canal stenosis. No foraminal stenosis.     L1-L2: No disc without bulge or protrusion. Facet joints are normal. No spinal canal or foraminal stenosis.     L2-L3: Bilobed left asymmetric diffuse disc bulge. Mild facet arthropathy with ligamentum flavum thickening. Mild left foraminal stenosis. No right foraminal or spinal canal stenosis.     L3-L4: Bilobed left asymmetric diffuse disc bulge. Mild facet arthropathy with ligamentum flavum thickening. In conjunction with superior endplate retropulsion, there is borderline mild central spinal canal stenosis and mild left subarticular recess   narrowing. Mild right and moderate left foraminal stenosis.     L4-L5: Small left foraminal disc protrusion. Moderate facet arthropathy. No spinal canal or foraminal stenosis.     L5-S1: No disc without bulge or protrusion. Facet joints are normal. No spinal canal or foraminal stenosis.                                                                      IMPRESSION:  1.  Extensive osseous metastases as  well as chronic pathologic fractures at L1, L3, and L4, as detailed.  2.  Partially visualized asymmetric signal abnormality in the left sacral ala may represent confluent pathologic marrow replacement. Marrow edema related to sacral fracture is difficult to exclude in the appropriate clinical context.  3.  Posterior paraspinous muscular edema can be compatible with posttraumatic injury/strain.  4.  Multilevel degenerative changes, as detailed.        7/6/2023.  MRI brain  IMPRESSION:  1.  No acute ischemia, mass/mass effect, or abnormal intracranial enhancement.   2.  Similar sequela from demyelinating disease.  3.  Osseous metastases, as before.      Echocardiogram 4/20/2023  Left ventricular size, wall motion and function are normal. The ejection  fraction is 60-65%.  Global right ventricular function is normal.  No hemodynamically significant valve abnormalities.  The inferior vena cava is normal.  No pericardial effusion.     This study was compared with the study from 4/7/22. No significant change.      3/27/2023.  PET/CT  1. No new suspicious FDG uptake within the skeleton.  2. Stable numerous non-FDG avid lytic and sclerotic osseous lesions  throughout the axial skeleton.  3. Stable nondisplaced fracture of the left acromion/glenoid, chronic  bilateral healing rib fractures, and compression deformities of the  thoracic and lumbar spine.  4. Hypermetabolic focus in the expected location of the AV node versus  mitral valve, this may represent a normal AV node versus mitral valve  pathology such as vegetations. Recommend follow-up with cardiac  Ultrasound.        11/30/2022-x-ray of the ribs of the left side  There are a few sclerotic lesion seen in the right anterior ribs that were seen on previous PET CTs. These appear to involve the fourth and fifth ribs. There are likely more subtle lesions that were   described on the PET CT scan. Irregularity of the distal end of the left ninth rib may be from metastatic  disease or fracture on the oblique view which likely is chronic.       10/18/2022-PET/CT showed no new suspicious FDG uptake within the skeleton.  Stable abnormal FDG avid lytic and sclerotic osseous lesions.  Mild diffuse FDG uptake throughout the large bowel without CT abnormality.    7/1/2022.  PET/CT shows diffuse sclerotic and lytic osseous lesions without any abnormal FDG uptake in the skeleton, consistent with treated disease.  There is evaluation of previous hypermetabolic bone lesions.  Multiple chronic rib fracture deformities and stable compression fracture deformities of T6, L1 and L4.  No suspicious FDG uptake in the chest abdomen or pelvis.    3/31/2022-PET/CT shows progressive bone metastasis with progressive FDG uptake in the following lesions. Left iliac crest lesion with max SUV of 7.2, previously 3.5. Right mid iliac crest lytic lesion shows maximum SUV of 7.8, previously 6.9.  T10 vertebral body mixed lytic and sclerotic lesion with an SUV of 7.1, previously not hypermetabolic. Thoracic vertebral bodies 8, 7, 5, and 4 also show hypermetabolic lesions were previously there was no hypermetabolism.  Some of the sclerotic osseous lesions are unchanged and do not show increased hypermetabolism compatible treated lesion such as a severe compression deformity at L1 as well as superior compression deformity at L4.  Medial right clavicle sclerotic lesion with SUV max of 4.8, previously not hypermetabolic. There is also right-sided sternal lesions.      Biopsy from the right acetabulum shows metastatic lobular carcinoma.  It is triple negative.  Androgen receptor was diffusely positive (90%, moderate intensity) by immunohistochemistry. )  PD-L1 negative.    Foundation one showed CDH1 mutation (initially lobular cancer), CCND1 amplification ( equivocal ). FGF3, FGF4, FGF19 amplification ( Equivocal ). Most promising is CCND1 amplification with abemaciclib showing response in 4/10 patients. FGF3,FGF4 and  FGF19 are targetable with pan-FGFR inhibitors.     ASSESSMENT AND PLAN:     Metastatic breast cancer negative breast cancer (androgen receptor positive ) with extensive involvement of the bones.  There is also a left lower lobe hypermetabolic lesion and mediastinal lymph nodes which are hypermetabolic.  The biopsy from the right iliac bone is consistent with metastatic lobular breast cancer but it is hormone receptor and HER-2 negative and PDL 1 is also negative.    Foundation 1 testing did not reveal any actionable mutations.    She was intolerant to Xeloda and progressed on Taxol and eribulin.      We then switched to recently FDA approved sacituzumab govitecan-hziy (Trodelvy) for TNBC, based on the results of the phase II IMMU-132-01 clinical trial.   She started this on 11/11/2020.      She obtained a second opinion from Dr. Castillo from [facility] who recommended a repeat biopsy to be done to make sure that she really has triple negative breast cancer.      She also met with Dr. Arelis Arshad on 3/18/2021.      After 10 cycles of Trodelvy, she had PET/CT on 6/11/2021 which showed mildly increased uptake in some of the bone lesions while stability in other bone lesions.        After 15 cycles, repeat PET scan showed stable findings.  CA 27-29 has been increasing and it is 1176.      As she was tolerating the chemotherapy well and her quality of life has been decent and scans were stable although she had a rising CA 27-29, we decided on continuing the same chemotherapy because it has been a very slow progression of the disease.    Then she had clear progression of the disease in the bones with increased FDG uptake in both right and left iliac bones and the sternum.    Foundation one showed CDH1 mutation (initially lobular cancer), CCND1 amplification ( equivocal ). FGF3, FGF4, FGF19 amplification ( Equivocal ). Most promising is CCND1 amplification with abemaciclib showing response in 4/10 patients.  FGF3,FGF4 and FGF19 are targetable with pan-FGFR inhibitor    As the tumor is diffusely positive for androgen receptor, we decided to start her on androgen receptor blockers.    I initially recommended enzalutamide 160 mg daily ( Enzalutamide for the Treatment of Androgen Receptor-Expressing Triple-Negative Breast Cancer----J Clin Oncol. 2018 Mar 20; 36(9): 884-890. ).    Eventually we decided to start her on Casodex 150 mg daily instead of Enzalutamide due to cost issues.  Clinical Trial Clin Cancer Res. 2013 Oct 1;19(19):5505-12.   Phase II trial of bicalutamide in patients with androgen receptor-positive, estrogen receptor-negative metastatic Breast Cancer  She started Casodex on 1/6/2022.    She had progressive disease in the bones on the PET scan on 3/31/2022.    We changed to single agent Doxil on 4/14/2022.      She developed significant nausea vomiting and headache so cycle #2 was given on 5/13/2022 with 20% dose reduction which she tolerated well.    Cycle #3 was given on 6/10/2022 with the same dose reduced Doxil.    Repeat PET/CT on 7/1/2022 shows resolution of the FDG avid bony metastasis consistent with treated disease.  No suspicious FDG uptake was seen in the chest abdomen and pelvis.  CA 27-29 was also coming down.    CA 15-3 has been slowly rising.    Repeat PET/CT on 8/8/2023 showed pretty stable findings.    She has been having more pain in the lumbar spine/low back.    Otherwise tolerating chemotherapy well.  As the progression has been very slow and mild and overall she has been tolerating chemotherapy well, we decided to continue the same chemotherapy.    Over time CA 15-3 has continued to slowly increase but the PET scan on 11/10/2023 overall showed very stable findings.    We decided to continue Doxil for now.    Overall chemotherapy is going well.  She will proceed with cycle #23 Doxil on 1/10/2024.      If CA 15-3 shows a rapid increase, then we will repeat PET/CT before next chemotherapy,  otherwise we will repeat PET/CT after 2 cycles.      In the future we can potentially consider another chemotherapy like carboplatin or gemcitabine.    Chemotherapy-induced neutropenia.    Continue Onpro support.      Anemia.  She had mild anemia from chemotherapy but last hemoglobin was normal.  Continue to observe.        Bone disease.  She has metastatic disease to the bone.  Previously she got palliative radiation to T12-L5 3000 cGy in 10 fractions from 9/6/2019 till 9/18/2019.  She was evaluated by orthopedics Dr. Bipin Elliott on 11/1/2019 and conservative management was recommended.    She was on Zometa every 3 months. She last received on 9/29/2020.  Because of progression of the disease in the bones, we switched to Xgeva which she received on 11/11/2020.    She had progression of the disease in the bones so we switched to Doxil but we continued xgeva.  PET scan on 7/1/2022 does not show any suspicious FDG uptake in the bones consistent with treated disease.  Repeat PET/CT in March 2023 was without any active FDG avid lesions.  Overall PET scan from 11/10/2023 remains pretty stable with slight decrease in FDG avidity of some lesions was a slight increase in some but overall showing pretty stable findings.  Continue calcium and vitamin D and continue monthly Xgeva.      Cancer related pain.  Following with palliative care.  Recently buprenorphine buccal film dose was increased which has helped.  She was also seen by Dr. Yusuf from pain clinic and she got bilateral SI joint steroid injections on 10/13/2023 which did not help much.  She recently saw Dr. Whitaker again who increased buprenorphine buccal film to 600 mcg in the morning and 450 mcg in the evening.  She takes morphine as needed.     She is being enrolled in medical cannabis program.    Nausea.  She has occasional nausea and she takes Zofran as needed.    Migraines.  Doing much better with Emgality.  She takes as needed Ubrelvy which helps.   She is following with neurology.      Bilateral pulmonary emboli.  Continue Eliquis.        We did not address the following today.      Shingles.  The rash has dried out.  She completed well with acyclovir and prednisone.  She takes gabapentin for postherpetic neuralgia and is doing better.      Neuropathy.  This remains mild and stable with neuropathy in her hands.    This is in addition to the chronic balance issues and heat and cold sensitivity from underlying multiple sclerosis which is also stable for years.  Continue to monitor.     Subcutaneous nodule in the scalp.  This looks like an epidermoid cyst.  She thinks it is a stable.  Continue to monitor.       Insomnia.  Continue trazodone.      Wall thickening of esophagus and FDG uptake of fundus of the stomach.  It could be in the setting of inflammation from recent nausea and vomiting.  Symptoms have resolved.  Continue to monitor clinically.    Vision changes.    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  It was thought that this could be due to Taxol as patient reported to the ophthalmologist that she was on Taxol in September 2019 when her symptoms started.  But she was not on Taxol in September 2019 when she started noticing the visual changes so Taxol is not responsible for this.  The first dose of Taxol was on 11/5/2019.   She had left cataract extraction on 2/8/2021 and she has noticed significant improvement in her vision.  Thinks that her vision is back to her usual.      Increased FDG ability in the colon.  She had FDG uptake in the cecum and ascending colon but no corresponding lesion was seen on the CT scan.  She is completely asymptomatic so at this time we will continue to observe.    Discussion regarding healthcare directives.  On previous visit she told me that she will bring the health care directive for our records but she forgot so I told her to bring it on next visit.      Breast implant removal.  She  tells me that she was planning to do breast implant removal because there was a recall on this.  Her surgery was scheduled for 9/24/2019.  Because of this new development of metastatic breast cancer and her recent radiation, surgery has been canceled.  We discussed that at this time treatment for metastatic breast cancer would take precedence over the other surgery as the other surgery would require her to be off chemotherapy for several weeks and in that case the chances of cancer progression would be high and I would not recommend that.    Multiple sclerosis.  She will continue to follow with her neurologist Dr. Quiles.  Currently multiple sclerosis is under control and currently she is not taking any medications.    Return to clinic 2/7/2024.    All of her questions were answered to her satisfaction.  She is agreeable and comfortable with the plan.    Dewayne Zepeda MD          Virtual Visit Details    Type of service:  Video Visit     Originating Location (pt. Location): Home    Distant Location (provider location):  Off-site  Platform used for Video Visit: [employer]  Video start time. 10:12 AM  Video stop time. 10:20 AM       Again, thank you for allowing me to participate in the care of your patient.        Sincerely,        Dewayne Zepeda MD

## 2024-01-09 NOTE — PROGRESS NOTES
ONCOLOGY FOLLOW UP NOTE: 1/09/24        I took the history from reviewing the previous notes that she was following with Dr. Amaya.  I have copied and updated from prior notes.    ONCOLOGY History:    1.  January 2006:  Diagnosed with Stage IIB, T2 N1 M0 invasive lobular carcinoma of the right breast.  Final pathology showed a 4.5 x 3.8 x 2.5 cm, 01/14 lymph nodes positive.  Estrogen, progesterone receptor positive, HER-2 negative.     2.  Genetics.  BRCA1 and 2 mutations not detected. 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       THERAPY TO DATE:   1. 2006:  ECOG 2104 protocol of dose-dense Adriamycin, Cytoxan and Avastin x4 cycles followed by Taxol and Avastin x4 cycles.   2.  03/2007:  Completed 1 year Avastin.   3.  11/2006-01/2007:  Radiotherapy to the right breast of 5040 cGy.   4.  [id number]:  Aromasin.  Stopped after moving to the [facility].   5.  01/2016:  Right modified radical mastectomy with latissimus dorsi flap reconstruction and a left prophylactic mastectomy with latissimus dorsi flap reconstruction.     She recently had MRI of the brain as a follow-up for multiple sclerosis and there were multiple calvarial lesions noted which was suspicious for metastatic disease.  There was no evidence of new multiple sclerosis lesions.  She had a PET scan on 8/30/2019 which showed widespread bone metastatic lesions (calvarium, spine, sacrum, pelvis, ribs most prominent at C7, T3, L1 and L4), hypermetabolic 3 cm lesion in LLL, and mediastinal/hilar LN. CEA wnl at 1.9 and C27-29 elevated to 415 (28 on 8/23/16). A CT guided biopsy of the right iliac bone was obtained on 9/4/19, pathology consistent with metastatic adenocarcinoma (breast), ER/TN negative, HER-2 negative PDL 1 < 1%. A second biopsy was take of the LLL nodule via EBUS on 9/5/19 did not show any malignancy.    C/T/L spine MRI 8/3/19 to 9/2/19 with numerous enhancing lesions of the C, T and L spine, largest around T9 and L1. No evidence of  cord compression. Has a L1 compression fracture and impending L4.     Received radiation T12-L5 3000 cGy in 10 fractions from 9/6/2019 till 9/18/2019.  Neurosurgery recommended no surgical intervention but to wear Gorge brace when out of bed and HOB >30 degrees    She started palliative Xeloda 2000/1500 on 10/1/2019 but after just a few doses she noticed nausea, red eyes blurred vision, loss of appetite.  She stopped taking it after 5 doses and was seen by nurse practitioner on 10/7/2019 when she was improving.  At that point she was restarted on Xeloda at a lower dose of 1000 mg twice a day.  As she was not able to tolerate even the lower dose with extreme nausea and vomiting and feeling extremely fatigued and blurry vision, we decided on stopping Xeloda.    We decided on repeating scans and MRI brain on 10/25/2019 showed no intracranial metastatic disease.   Multiple enhancing calvarial lesions may be increased in number and are suggestive of metastatic disease. Thinner imaging on the current study may identify the smaller lesions and may be responsible for  identified more lesions.   There is a single focus of T2 hyperintense signal within the anterior right temporal lobe may represent interval demyelination. There is no evidence for active demyelination.    PET/CT on 11/1/2019 showed favorable response to therapy and Overall FDG uptake within scattered osseous metastases is decreased since 8/30/2019, particularly within the pelvis. Increased sclerotic appearance of L1 and L4 pathologic compression deformities, likely sequela of recently completed palliative radiation therapy.    No significant FDG uptake in previously demonstrated hypermetabolic mediastinal lymph nodes. Biopsy negative on 9/5/2019.  There is also decreased FDG uptake in the left lower lobe (biopsy negative for  malignancy), now with dense consolidation containing air bronchograms suggestive of infection versus aspiration.  There is diffuse FDG  uptake within the esophagus, consistent with esophagitis.    Because of intolerance to Xeloda we decided on switching to weekly paclitaxel on 11/5/2019.    C#2 Taxol 12/4/19- started with 70mg/m2 dose reduction    C#3 Taxol 12/30/2019     PET/CT on 1/24/2020 showed progression of the disease in some of the bone lesions including increase in size and lytic lesion within the vertebral body of C4 measuring 1 cm as opposed to 0.6 cm previously and a new central soft tissue nodule with SUV max 4.1 when previously it was non-hypermetabolic.  There is also some progression noted in the right proximal femur and right iliac bone.  There is decreased metabolic uptake in the right lamina of T9 with SUV max 4.7 when previously it was 8.0.  Other lesions are stable.    CA 27-29 also had increased significantly and it was 257 on 1/28/2020.    We decided on switching to eribulin on 1/28/2020.    C#2 2/18/2020  C#2 D#8 2/25/2020    C#3 D#1 3/18/2020 -delayed by 1 week as per patient's preference because she wanted to visit her family.    She had a repeat PET/CT on 3/27/2020 which showed stable size of the bone metastatic lesions although the FDG avidity was less, consistent with treatment response.    She had MRI of the thoracic spine on 4/3/2020 and it was compared to the one which was done in August 2019 and it showed increased size of several metastatic lesions most notably at T9 but also at T5-T7.  Other lesions at T10, T11 and T12 appeared improved.    CA 27-29 was also down to 222 on 3/18/2020.    Cycle #4 eribulin ( dose reduced to 1.2 mg/m2 )-4/7/2020-   Cycle #5 Eribulin ( 1.2 mg/m2 )- 4/28/2020   Cycle #6 Eribulin ( 1.2 mg/m2 )- 5/19/2020     Cycle #7 Eribulin ( 1.2 mg/m2 )- 6/9/2020    Cycle #8 Eribulin ( 1.2 mg/m2 )- 6/30/2020     She had a repeat PET scan on 7/17/2020 which showed incidental finding of new acute bilateral pulmonary embolism in the distal left main pulmonary artery extending in the left upper and lower  lobes and in the segmental and branch arteries in the right lower lobe.    It also showed mildly increased FDG avidity in the lytic lesion in the T9 lamina and right pedicle with SUV max 5.3, previously 3.9.  That is also slightly increased FDG uptake to the left anterior fifth rib at the costochondral junction SUV max 3.9, previously 2.5.  There is slightly decreased FDG uptake in the right femoral neck lesion SUV max 2.2, previously 2.9.  FDG uptake in other bone lesions is fairly stable.  No new metastasis are seen.    CA 27-29 was 308 on 6/30/2020.  It was 286 on 5/19/2020.    Overall this is consistent with only slight progression versus stable disease.    She was started on Lovenox.    Cycle #9 eribulin 7/21/2020. CA 27-29 was 238    C#10 eribulin 8/18/2020. ( delayed by one week for ANC 0.9 )    C#11 Eribulin 9/8/2020    C#12 Eribulin 9/29/2020     C#13 Eribulin 10/20/2020     PET scan on 11/6/2020 shows progression of the disease with increased FDG uptake in the lytic lesions in the T9/T10 and right posterolateral elements as well as increased FDG uptake in the previously known hypermetabolic right iliac bone lesion suggesting recurrence of previously treated metastasis.  There is new subtle lesion in the right manubrium.  There is also a new fracture in the anterolateral left fourth rib with associated uptake and this could be a pathologic fracture.  Healing fracture in the left anterior fifth rib lesion.  There is resolution of the left pulmonary emboli.  Decreased FDG uptake of the esophagus consistent with improving inflammation.    CA 27-29 on 10/20/2020 was also elevated at 489.    C#1 Trodelvy on 11/11/2020.  Cycle #2 Trodelvy 12/2/2020  Cycle number 2-day #8 Trodelvy 12/8/2020.    12/15/2020-12/16/2020.  She was admitted to the hospital with nausea vomiting and dehydration.  She had tachycardia and initial lactic acid of 5.  It decreased to 1.4 after 2 L of normal saline.  She also had hypokalemia  and ANC was 1.3.  She was treated with IV fluids.  Procalcitonin was negative.  CTA of the chest was negative for pulmonary embolism or pneumonia.  No bacterial infection was documented.  Her medication which she was initially unable to tolerate at home, were restarted and she was discharged home once started to feel better.      We delayed the start of cycle number 3 x 1 week and decrease the dose of chemotherapy by 25%.  She also had neutropenia with ANC of 1.0.      She started cycle number 3-day #1 on 12/29/2020.  CA 27-29 was 392.    Cycle number 3-day #8 1/5/2021.    C#4 Trodelvy 1/20/2021- 75% dose    2/5/2021.  PET/CT overall shows a good response to treatment with improvement of previously hypermetabolic lesions in the spine and pelvis and stability of other bone meta stasis.  There is a single lytic lesion in the right iliac bone which demonstrates slight Yimi increased FDG uptake and has SUV max 4.7 while previously it was SUV max 3.4.  There was diffuse wall thickening of the esophagus with uptake in the esophagus in the fundus of the stomach and this could be seen with inflammation.    Trodelvy cycle #5 - 2/10/2021.  75% of the dose.  CA 27-29 was 337.    Trodelvy cycle #6 - 3/3/2021.  75% of the dose.  Trodelvy cycle #6, D#8 - 3/10/2021.  75% of the dose.    Trodelvy C#8, D#8 on 4/21/2021  CA 27-29 stable at 371 on 4/14/2021    Trodelvy, cycle #9- 5/5/2021        On 2/25/2020 when she had biopsy of the right acetabulum and it was consistent with metastatic lobular carcinoma.  Again it was Triple Negative.     On 3/18/2020 when she also met with Dr. Arelis Arshad who also advised testing for androgen receptor studies on the biopsy specimen.  Tumor was diffusely androgen receptor positive.      5/26/2021-cycle #10 Trodelvy     CA 27-29 was 545.    6/11/2021.  PET/CT shows mildly increased uptake in several bone lesions for example in the left anterior iliac crest, mid right iliac crest and left ischium.   Uptake in other bone lesions are similar to previously.    Because of minimal progression of the disease and she is tolerating chemotherapy very well, we decided on continuing the same chemotherapy.    6/16/2021-Trodelvy cycle #11    7/7/2021 -Trodelvy cycle #12    9/14/2021-Trodelvy-cycle #15, day #8.    9/8/2021-CA 27-29 was more elevated and it was 1176.    PET/CT on 9/24/2021 showed stable findings with no evidence of FDG avid metastatic disease within the body.  Left axillary lymph nodes are prominent and hypermetabolic and likely from recent vaccinations in the left deltoid muscle.  Healed bony metastasis seems stable.      Cycle #16, Trodelvy 9/28/2021.  Cycle #17, day #8 Trodelvy was on 10/26/2021.  Cycle #18, day #8 Trodelvy on 11/22/2021       She saw Dr. Jackie jamison on 10/6/2021.  She was not considered eligible for Enfortumab trial.    Cycle #19, Trodelvy on 12/7/2021 12/21/2021.  PET/CT showed progression of bony metastatic disease.  There is increase hypermetabolism in both the right and left iliac bones and the sternum.  Other bone lesions are stable.    There is new scattered areas of groundglass throughout both the lungs and these findings are indeterminate but may represent an infectious etiology.  Interval resolution of left axillary FDG avid lymphadenopathy.    She was started on Casodex 150 mg daily on 1/6/2022.    She was admitted to the hospital from 3/17/2022-3/19/2022 for vomiting and diarrhea and  severe back pain.  I reviewed the discharge summary.  CT lumbar spine showed a stable severe chronic L1 pathologic compression fracture.  Stable mild compression deformity of superior endplate of L3 and superior endplate of L4.  The upper margin of the L4 fracture extends slightly into the spinal canal without high-grade canal stenosis and this is also stable.  Nondisplaced fractures coursing through the L3 pedicles bilaterally were seen which were not clearly seen previously this could be  acute and likely pathologic.  No extraspinal soft tissue abnormality.  Neurosurgery recommended conservative management.  Her pain was controlled and she was discharged home as she felt better with this.      3/31/2022-PET/CT shows progressive bone meta stasis with progressive FDG uptake in the following lesions. Left iliac crest lesion with max SUV of 7.2, previously 3.5. Right mid iliac crest lytic lesion shows maximum SUV of 7.8, previously 6.9.  T10 vertebral body mixed lytic and sclerotic lesion with an SUV of 7.1, previously not hypermetabolic. Thoracic vertebral bodies 8, 7, 5, and 4 also show hypermetabolic lesions were previously there was no hypermetabolism.  Some of the sclerotic osseous lesions are unchanged and do not show increased hypermetabolism compatible treated lesion such as a severe compression deformity at L1 as well as superior compression deformity at L4.  Medial right clavicle sclerotic lesion with SUV max of 4.8, previously not hypermetabolic. There is also right-sided sternal lesions.      4/14/2022--C#1- switch to single agent Doxil 50 mg per metered squared every 4 weeks.    5/13/2022-cycle #2- Doxil- dose reduced to 40 mg per metered square due to severe nausea/vomiting and migraines requiring IV fluids and IV antiemetics.    6/10/2022-cycle #3-Doxil 40 mg per metered square    7/1/2022- PET/CT shows resolution of the hypermetabolic skeletal metastasis.    7/6/2022-cycle #4-Doxil 40 mg per metered square  8/5/2022- cycle #5-Doxil  9/1/2022.  Cycle #6-Doxil  9/30/2022- cycle #7-Doxil    10/18/2022.  PET/CT does not show evidence of any FDG activity    11/2/2022-cycle #8-Doxil.  11/30/2022-cycle #9-Doxil.  12/28/2022-cycle #10-Doxil    2/3/2023- C#11- Doxil (delayed by 1 week because neutrophil count was 0.9)-wanted to give her Onpro but at that time it was not approved by insurance.    3/3/2023.  Cycle #12 Doxil.  Given with Onpro.    Repeat PET/CT on 3/27/2023 overall showed very stable  findings.  PET/CT showed focal FDG avidity in the heart. This is in an expected location of AV node or mitral valve.  This is nonspecific.  We checked an echocardiogram on 4/20/2023 which was unremarkable.  She is asymptomatic.  Continue to monitor clinically.      3/31/2023.  Cycle #13 Doxil.  Given with Onpro.     4/12/2023-she presented to ED with increased migraine headaches and nausea and vomiting.  She was treated symptomatically and was discharged home after she started to feel better.      4/28/2023.  Cycle #14 Doxil.  Given with Onpro    Cycle #16 Doxil with Onpro 6/23/2023    Cycle #17 Doxil with Onpro 7/21/2023      CA 15-3 has been slowly rising.    Repeat PET/CT on 8/8/2023 showed pretty stable findings.    She has been having more pain in the lumbar spine/low back.    8/18/2023.  Cycle #18 Doxil with Onpro    9/15/2023.  Cycle #19 Doxil with Onpro.    10/16/2023.  Cycle #20 Doxil with Onpro.    11/10/2023.  PET/CT overall showed pretty stable findings.    11/15/2023.  Cycle #21 Doxil with Onpro    12/13/2023.  Cycle #22 Doxil with Onpro     1/10/2024.  Cycle #23 Doxil with Onpro is planned.      Interval history.  This is a video visit.  I also reviewed notes from Dr. Whitaker from palliative care.      Currently taking Belbuca 600 mcg in the morning and 450 mcg in the evening.  She takes morphine as needed.  On average she takes 1 morphine at night.  With this her pain is under fair control.  She takes calcium and vitamin D.  Overall she is tolerating chemotherapy well but she feels more fatigued.  She has occasional nausea and vomiting.  She takes Zofran which helps.  Denies diarrhea or constipation.  No new pain and no new swellings.  No fevers or infections or shortness of breath.  Recently her father-in-law and one of her friends passed away so she feels sad about the recent events.         ECOG 1    ROS:  Rest of the comprehensive review of the system was negative.        I reviewed other history  in epic as below.       PAST MEDICAL HISTORY:     1.  Breast cancer  2.  Multiple sclerosis.   3.  Depression.   4.  Migraines.   5.  Hypertension.   6.  Cholecystectomy and umbilical hernia repair.     SOCIAL HISTORY: She smoked for 5 years but quit many years ago.  Drinks alcohol socially.  She lives with her .  She is a teacher in middle school.       FAMILY HISTORY: She mentions that her mother had breast cancer at 49.  Both grandmothers had breast cancer but she is not sure of the age.  A couple of cousins have cancers.  Patient has 3 children who are healthy.    Current Outpatient Medications   Medication    traZODone (DESYREL) 50 MG tablet    apixaban ANTICOAGULANT (ELIQUIS ANTICOAGULANT) 5 MG tablet    Buprenorphine HCl (BELBUCA) 450 MCG FILM buccal film    Buprenorphine HCl (BELBUCA) 600 MCG FILM buccal film    busPIRone (BUSPAR) 15 MG tablet    calcium citrate-vitamin D (CITRACAL) 315-250 MG-UNIT TABS    Cholecalciferol (VITAMIN D3 PO)    galcanezumab-gnlm (EMGALITY) 120 MG/ML injection    LORazepam (ATIVAN) 0.5 MG tablet    metoclopramide (REGLAN) 5 MG tablet    morphine (MSIR) 15 MG IR tablet    OLANZapine (ZYPREXA) 5 MG tablet    ondansetron (ZOFRAN ODT) 4 MG ODT tab    propranolol ER (INDERAL LA) 80 MG 24 hr capsule    propranolol ER (INDERAL LA) 80 MG 24 hr capsule    ubrogepant (UBRELVY) 100 MG tablet    venlafaxine (EFFEXOR XR) 75 MG 24 hr capsule    vitamin B-2 (RIBOFLAVIN) 25 MG TABS tablet    ZOLMitriptan (ZOMIG) 5 MG nasal spray     No current facility-administered medications for this visit.     Facility-Administered Medications Ordered in Other Visits   Medication    heparin 100 unit/mL injection 5 mL    sodium chloride (PF) 0.9% PF flush 10 mL        Allergies   Allergen Reactions    Bactrim [Sulfamethoxazole W/Trimethoprim]      Internal bleeding    Copaxone [Glatiramer] Hives          PHYSICAL EXAMINATION:     There were no vitals taken for this visit.  Wt Readings from Last 4  Encounters:   01/03/24 54.4 kg (120 lb)   12/13/23 56.5 kg (124 lb 8 oz)   12/05/23 53.5 kg (118 lb)   11/15/23 57.2 kg (126 lb)             Constitutional.  Does not seem to be in any acute distress.  Eyes.  No redness or discharge noted.  Respiratory.  Speaking in full sentences.  Breathing seems comfortable without any accessory use of muscles.    Skin.  Visualized his skin does not show any obvious rashes.  Musculoskeletal.  Range of motion for visualized areas is intact.  Neurological.  Alert and oriented x3.  Psychiatric.  Mood, mentation and affect are normal.  Decision making capacity is intact.      The rest of a comprehensive physical examination is deferred due to Public Health Emergency video visit restrictions.      Labs and Imaging.    Reviewed.    12/13/2023  CBC shows WBC 6.7.  Hemoglobin 12.1.  Platelets 193.     CMP shows calcium 8.6.      CA 15-3 was 408 on 12/13/2023    CA 15-3 was 287 on 11/15/2023    CA 15-3 was 237 on 10/16/2023    CA 15-3 was 242 on 9/15/2023    CA 15-3 was 193 on 8/18/2023 7/21/2023.    CA 15-3 was 186.    7/3/2023    CA 15-3 was 177 on 6/23/2023      CA 15-3 was 129 on 3/31/2023      CA 15-3 was 123 on 3/3/2023.      11/30/2022     CA 15-3 was 135.    9/30/2022  CA 15-3 was 159.  CA 27-29 was 1992 on 6/10/2022  CA 27-29 was 3370 on 5/13/2022.  It was 5307 on 4/14/2022 ferritin Doxil was started.      9/28/2021.      Iron studies, ferritin is 101, iron 51, TIBC 268 and iron saturation index 19%.        PET/CT on 11/10/2023  COMPARISON: PET 8/8/2023        FINDINGS:      HEAD/NECK:  There is no suspicious FDG uptake in the neck.     The paranasal sinuses are clear. The mastoid air cells are clear.      No hypermetabolic lymph nodes are demonstrated in the neck.      The mucosal and deep spaces of the neck are unremarkable. The major  salivary glands are unremarkable. The thyroid is unremarkable. The  major vasculature of the neck are patent.     CHEST:  Bilateral  mastectomies with breast implants.  There is no suspicious FDG uptake in the chest.     The central tracheobronchial tree is clear. No pleural effusion or  pneumothorax. No acute consolidation.      No hypermetabolic lymph nodes are demonstrated in the chest.     Heart size is within normal limits. No pericardial effusion. The  thoracic aorta and main pulmonary artery are within normal limits for  diameter. The esophagus is unremarkable.      ABDOMEN AND PELVIS:  There is no suspicious FDG uptake in the abdomen or pelvis.     The liver is unremarkable. Cholecystectomy clips. No intrahepatic or  extrahepatic biliary ductal dilatation. The pancreas is unremarkable.  No pancreatic ductal dilatation. The spleen is unremarkable. The  adrenal glands are unremarkable.     Symmetric enhancement of the kidneys. No hydronephrosis. The urinary  bladder is unremarkable. Uterus is unremarkable. Ovaries are absent.  No hypermetabolic lymph nodes are demonstrated in the abdomen or  pelvis.     Normal caliber of the small and large bowel. No free air, free fluid,  or fluid collection. Normal caliber of the abdominal aorta.     LOWER EXTREMITIES:   There is no suspicious FDG uptake in the visualized lower extremities.     BONES: Again demonstrated are numerous sclerotic and lytic lesions  some of which may remains mildly hypermetabolic. FDG uptake in bone  metastases are not substantially changed, with a few lesions showing  mild FDG increases, others decreased, and others not significantly  changed. Examples include:     Decreased FDG:    T8 vertebral body max SUV 3.1 (prior 4.3).  T9 left posterior lytic lesion max SUV 3.1 (prior 3.4)     Increased FDG:   A left ninth rib costovertebral chondral junction max SUV 3 (prior  2.6.)  Right sixth rib anteriorly max SUV 3.8 prior 2.7.     Not significantly changed:  Right second rib anterolateral focus of hypermetabolism max SUV 3.9,  not significantly changed from prior.   Right iliac  bone max SUV 3.6 (prior 3.9).     Multiple old right rib fractures. Left 10th rib the deformity with  callus. A severe compression deformity of L1 unchanged.                                                                      IMPRESSION: In this patient with history of breast cancer:  Overall  stable disease.  Again demonstrated are numerous metastatic bone lesions, some of which  have mildly elevated FDG activity. Some have increased slightly,  others have decreased slightly,  others are not significantly changed.      CT lumbar spine on 8/28/2023  IMPRESSION:  1.  No evidence for acute displaced fracture. Fractures of the L1, L3 and L4 vertebral bodies appear stable from 07/31/2023.  2.  No evidence of traumatic subluxation.  3.  Diffuse osseous metastatic disease.    MRI of the lumbar spine on 7/31/2023  COMPARISON: PET/CT 03/27/2023.  TECHNIQUE: MRI Lumbar Spine without IV contrast.     FINDINGS: Examination is mildly limited by patient motion artifact.     Alignment is unchanged. Redemonstrated heterogeneous bone marrow signal throughout the visualized lumbosacral spine including numerous focal abnormal T1 hypointense and STIR hyperintense marrow replacing lesions, including confluent lesions at T12 and S2   vertebrae. Similar chronic L1 pathologic burst fracture with mild superior endplate retropulsion and severe anterior-central vertebral body height loss. Likewise, similar chronic L4 superior endplate fracture with associated mild retropulsion. Large   Schmorl's node at the L2 inferior endplate, as before. Vertical defects in the bilateral L3 pedicles are redemonstrated compatible with chronic pathologic fractures. Asymmetric STIR hyperintense signal abnormality in the left sacral ala partially   visualized, which may represent additional confluent pathologic marrow replacement. Otherwise, remaining lumbar vertebral body heights are maintained. Conus signal is normal and terminates at L2. No perivertebral  soft tissue edema. There is mild edema   within the bilateral multifidus and erector spinae muscles, which may indicate posttraumatic injury/strain. Superficial subcutaneous soft tissue edema may be posttraumatic or related to positioning. Prominence of bile duct may be related to   postcholecystectomy physiologic ectasia. Small/subcentimeter right renal cyst. Last fully formed disc is designated L5-S1. Multilevel disc degeneration and height loss, severe at L5-S1.     T12-L1: No disc without bulge or protrusion. Facet joints are normal. Mild L1 superior endplate retropulsion indents the ventral thecal sac without spinal canal stenosis. No foraminal stenosis.     L1-L2: No disc without bulge or protrusion. Facet joints are normal. No spinal canal or foraminal stenosis.     L2-L3: Bilobed left asymmetric diffuse disc bulge. Mild facet arthropathy with ligamentum flavum thickening. Mild left foraminal stenosis. No right foraminal or spinal canal stenosis.     L3-L4: Bilobed left asymmetric diffuse disc bulge. Mild facet arthropathy with ligamentum flavum thickening. In conjunction with superior endplate retropulsion, there is borderline mild central spinal canal stenosis and mild left subarticular recess   narrowing. Mild right and moderate left foraminal stenosis.     L4-L5: Small left foraminal disc protrusion. Moderate facet arthropathy. No spinal canal or foraminal stenosis.     L5-S1: No disc without bulge or protrusion. Facet joints are normal. No spinal canal or foraminal stenosis.                                                                      IMPRESSION:  1.  Extensive osseous metastases as well as chronic pathologic fractures at L1, L3, and L4, as detailed.  2.  Partially visualized asymmetric signal abnormality in the left sacral ala may represent confluent pathologic marrow replacement. Marrow edema related to sacral fracture is difficult to exclude in the appropriate clinical context.  3.  Posterior  paraspinous muscular edema can be compatible with posttraumatic injury/strain.  4.  Multilevel degenerative changes, as detailed.        7/6/2023.  MRI brain  IMPRESSION:  1.  No acute ischemia, mass/mass effect, or abnormal intracranial enhancement.   2.  Similar sequela from demyelinating disease.  3.  Osseous metastases, as before.      Echocardiogram 4/20/2023  Left ventricular size, wall motion and function are normal. The ejection  fraction is 60-65%.  Global right ventricular function is normal.  No hemodynamically significant valve abnormalities.  The inferior vena cava is normal.  No pericardial effusion.     This study was compared with the study from 4/7/22. No significant change.      3/27/2023.  PET/CT  1. No new suspicious FDG uptake within the skeleton.  2. Stable numerous non-FDG avid lytic and sclerotic osseous lesions  throughout the axial skeleton.  3. Stable nondisplaced fracture of the left acromion/glenoid, chronic  bilateral healing rib fractures, and compression deformities of the  thoracic and lumbar spine.  4. Hypermetabolic focus in the expected location of the AV node versus  mitral valve, this may represent a normal AV node versus mitral valve  pathology such as vegetations. Recommend follow-up with cardiac  Ultrasound.        11/30/2022-x-ray of the ribs of the left side  There are a few sclerotic lesion seen in the right anterior ribs that were seen on previous PET CTs. These appear to involve the fourth and fifth ribs. There are likely more subtle lesions that were   described on the PET CT scan. Irregularity of the distal end of the left ninth rib may be from metastatic disease or fracture on the oblique view which likely is chronic.       10/18/2022-PET/CT showed no new suspicious FDG uptake within the skeleton.  Stable abnormal FDG avid lytic and sclerotic osseous lesions.  Mild diffuse FDG uptake throughout the large bowel without CT abnormality.    7/1/2022.  PET/CT shows diffuse  sclerotic and lytic osseous lesions without any abnormal FDG uptake in the skeleton, consistent with treated disease.  There is evaluation of previous hypermetabolic bone lesions.  Multiple chronic rib fracture deformities and stable compression fracture deformities of T6, L1 and L4.  No suspicious FDG uptake in the chest abdomen or pelvis.    3/31/2022-PET/CT shows progressive bone metastasis with progressive FDG uptake in the following lesions. Left iliac crest lesion with max SUV of 7.2, previously 3.5. Right mid iliac crest lytic lesion shows maximum SUV of 7.8, previously 6.9.  T10 vertebral body mixed lytic and sclerotic lesion with an SUV of 7.1, previously not hypermetabolic. Thoracic vertebral bodies 8, 7, 5, and 4 also show hypermetabolic lesions were previously there was no hypermetabolism.  Some of the sclerotic osseous lesions are unchanged and do not show increased hypermetabolism compatible treated lesion such as a severe compression deformity at L1 as well as superior compression deformity at L4.  Medial right clavicle sclerotic lesion with SUV max of 4.8, previously not hypermetabolic. There is also right-sided sternal lesions.      Biopsy from the right acetabulum shows metastatic lobular carcinoma.  It is triple negative.  Androgen receptor was diffusely positive (90%, moderate intensity) by immunohistochemistry. )  PD-L1 negative.    Foundation one showed CDH1 mutation (initially lobular cancer), CCND1 amplification ( equivocal ). FGF3, FGF4, FGF19 amplification ( Equivocal ). Most promising is CCND1 amplification with abemaciclib showing response in 4/10 patients. FGF3,FGF4 and FGF19 are targetable with pan-FGFR inhibitors.     ASSESSMENT AND PLAN:     Metastatic breast cancer negative breast cancer (androgen receptor positive ) with extensive involvement of the bones.  There is also a left lower lobe hypermetabolic lesion and mediastinal lymph nodes which are hypermetabolic.  The biopsy from  the right iliac bone is consistent with metastatic lobular breast cancer but it is hormone receptor and HER-2 negative and PDL 1 is also negative.    Foundation 1 testing did not reveal any actionable mutations.    She was intolerant to Xeloda and progressed on Taxol and eribulin.      We then switched to recently FDA approved sacituzumab govitecan-hziy (Trodelvy) for TNBC, based on the results of the phase II IMMU-132-01 clinical trial.   She started this on 11/11/2020.      She obtained a [facility] recommended a repeat biopsy to be done to make sure that she really has triple negative breast cancer.      She also met with Dr. Arelis Arshad on 3/18/2021.      After 10 cycles of Trodelvy, she had PET/CT on 6/11/2021 which showed mildly increased uptake in some of the bone lesions while stability in other bone lesions.        After 15 cycles, repeat PET scan showed stable findings.  CA 27-29 has been increasing and it is 1176.      As she was tolerating the chemotherapy well and her quality of life has been decent and scans were stable although she had a rising CA 27-29, we decided on continuing the same chemotherapy because it has been a very slow progression of the disease.    Then she had clear progression of the disease in the bones with increased FDG uptake in both right and left iliac bones and the sternum.    Foundation one showed CDH1 mutation (initially lobular cancer), CCND1 amplification ( equivocal ). FGF3, FGF4, FGF19 amplification ( Equivocal ). Most promising is CCND1 amplification with abemaciclib showing response in 4/10 patients. FGF3,FGF4 and FGF19 are targetable with pan-FGFR inhibitor    As the tumor is diffusely positive for androgen receptor, we decided to start her on androgen receptor blockers.    I initially recommended enzalutamide 160 mg daily ( Enzalutamide for the Treatment of Androgen Receptor-Expressing Triple-Negative Breast  Cancer----J Clin Oncol. 2018 Mar 20; 36(9): 884-890. ).    Eventually we decided to start her on Casodex 150 mg daily instead of Enzalutamide due to cost issues.  Clinical Trial Clin Cancer Res. 2013 Oct 1;19(19):5505-12.   Phase II trial of bicalutamide in patients with androgen receptor-positive, estrogen receptor-negative metastatic Breast Cancer  She started Casodex on 1/6/2022.    She had progressive disease in the bones on the PET scan on 3/31/2022.    We changed to single agent Doxil on 4/14/2022.      She developed significant nausea vomiting and headache so cycle #2 was given on 5/13/2022 with 20% dose reduction which she tolerated well.    Cycle #3 was given on 6/10/2022 with the same dose reduced Doxil.    Repeat PET/CT on 7/1/2022 shows resolution of the FDG avid bony metastasis consistent with treated disease.  No suspicious FDG uptake was seen in the chest abdomen and pelvis.  CA 27-29 was also coming down.    CA 15-3 has been slowly rising.    Repeat PET/CT on 8/8/2023 showed pretty stable findings.    She has been having more pain in the lumbar spine/low back.    Otherwise tolerating chemotherapy well.  As the progression has been very slow and mild and overall she has been tolerating chemotherapy well, we decided to continue the same chemotherapy.    Over time CA 15-3 has continued to slowly increase but the PET scan on 11/10/2023 overall showed very stable findings.    We decided to continue Doxil for now.    Overall chemotherapy is going well.  She will proceed with cycle #23 Doxil on 1/10/2024.      If CA 15-3 shows a rapid increase, then we will repeat PET/CT before next chemotherapy, otherwise we will repeat PET/CT after 2 cycles.      In the future we can potentially consider another chemotherapy like carboplatin or gemcitabine.    Chemotherapy-induced neutropenia.    Continue Onpro support.      Anemia.  She had mild anemia from chemotherapy but last hemoglobin was normal.  Continue to  observe.        Bone disease.  She has metastatic disease to the bone.  Previously she got palliative radiation to T12-L5 3000 cGy in 10 fractions from 9/6/2019 till 9/18/2019.  She was evaluated by orthopedics Dr. Bipin Elliott on 11/1/2019 and conservative management was recommended.    She was on Zometa every 3 months. She last received on 9/29/2020.  Because of progression of the disease in the bones, we switched to Xgeva which she received on 11/11/2020.    She had progression of the disease in the bones so we switched to Doxil but we continued xgeva.  PET scan on 7/1/2022 does not show any suspicious FDG uptake in the bones consistent with treated disease.  Repeat PET/CT in March 2023 was without any active FDG avid lesions.  Overall PET scan from 11/10/2023 remains pretty stable with slight decrease in FDG avidity of some lesions was a slight increase in some but overall showing pretty stable findings.  Continue calcium and vitamin D and continue monthly Xgeva.      Cancer related pain.  Following with palliative care.  Recently buprenorphine buccal film dose was increased which has helped.  She was also seen by Dr. Yusuf from pain clinic and she got bilateral SI joint steroid injections on 10/13/2023 which did not help much.  She recently saw Dr. Whitaker again who increased buprenorphine buccal film to 600 mcg in the morning and 450 mcg in the evening.  She takes morphine as needed.     She is being enrolled in medical cannabis program.    Nausea.  She has occasional nausea and she takes Zofran as needed.    Migraines.  Doing much better with Emgality.  She takes as needed Ubrelvy which helps.  She is following with neurology.      Bilateral pulmonary emboli.  Continue Eliquis.        We did not address the following today.      Shingles.  The rash has dried out.  She completed well with acyclovir and prednisone.  She takes gabapentin for postherpetic neuralgia and is doing better.      Neuropathy.   This remains mild and stable with neuropathy in her hands.    This is in addition to the chronic balance issues and heat and cold sensitivity from underlying multiple sclerosis which is also stable for years.  Continue to monitor.     Subcutaneous nodule in the scalp.  This looks like an epidermoid cyst.  She thinks it is a stable.  Continue to monitor.       Insomnia.  Continue trazodone.      Wall thickening of esophagus and FDG uptake of fundus of the stomach.  It could be in the setting of inflammation from recent nausea and vomiting.  Symptoms have resolved.  Continue to monitor clinically.    Vision changes.    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  It was thought that this could be due to Taxol as patient reported to the ophthalmologist that she was on Taxol in September 2019 when her symptoms started.  But she was not on Taxol in September 2019 when she started noticing the visual changes so Taxol is not responsible for this.  The first dose of Taxol was on 11/5/2019.   She had left cataract extraction on 2/8/2021 and she has noticed significant improvement in her vision.  Thinks that her vision is back to her usual.      Increased FDG ability in the colon.  She had FDG uptake in the cecum and ascending colon but no corresponding lesion was seen on the CT scan.  She is completely asymptomatic so at this time we will continue to observe.    Discussion regarding healthcare directives.  On previous visit she told me that she will bring the health care directive for our records but she forgot so I told her to bring it on next visit.      Breast implant removal.  She tells me that she was planning to do breast implant removal because there was a recall on this.  Her surgery was scheduled for 9/24/2019.  Because of this new development of metastatic breast cancer and her recent radiation, surgery has been canceled.  We discussed that at this time treatment for metastatic  breast cancer would take precedence over the other surgery as the other surgery would require her to be off chemotherapy for several weeks and in that case the chances of cancer progression would be high and I would not recommend that.    Multiple sclerosis.  She will continue to follow with her neurologist Dr. Quiles.  Currently multiple sclerosis is under control and currently she is not taking any medications.    Return to clinic 2/7/2024.    All of her questions were answered to her satisfaction.  She is agreeable and comfortable with the plan.    Dewayne Zepeda MD

## 2024-01-10 NOTE — PROGRESS NOTES
Infusion Nursing Note:  Shaneka Alvarez presents today for C39D1 Doxil & IVF; Xgeva.    Patient seen by provider today: No (visit with Dr. Zepeda yesterday)   present during visit today: Not Applicable.    Note: Patient reports dealing with loss at home, tearful during appointment today. Emotional support provided. Medically, patient states she is at her baseline. See flowsheets for assessment.    Intravenous Access:  Implanted Port.    Treatment Conditions:  Lab Results   Component Value Date    HGB 11.8 01/10/2024    WBC 7.1 01/10/2024    ANEU 22.4 (H) 07/27/2023    ANEUTAUTO 5.3 01/10/2024     01/10/2024        Lab Results   Component Value Date     01/10/2024    POTASSIUM 4.2 01/10/2024    MAG 2.0 03/12/2023    CR 0.72 01/10/2024    RAFAELA 9.1 01/10/2024    BILITOTAL 0.3 01/10/2024    ALBUMIN 3.8 01/10/2024    ALT 11 01/10/2024    AST 30 01/10/2024       Results reviewed, labs MET treatment parameters, ok to proceed with treatment.    Post Infusion Assessment:  Patient tolerated infusion without incident.  Patient tolerated injection without incident.  Blood return noted pre and post infusion.  Site patent and intact, free from redness, edema or discomfort.  No evidence of extravasations.  Access discontinued per protocol.   ONPRO  Was placed on patient's: right side of abdomen.  Was placed at 2 PM  Podpal used: Yes  Patient education included: that Neulasta administration will occur at 5 pm on 1/11.  Patient tolerated administration well.    Discharge Plan:   Future appts have been reviewed and crosschecked with appt note and plan.  AVS to patient via [employer].  Patient will return 2/7/2024 for next appointment.   Patient discharged in stable condition accompanied by: family.  Departure Mode: Ambulatory.      Devorah Trivedi RN BSN OCN

## 2024-01-11 NOTE — LETTER
1/11/2024         RE: Shaneka Alvarez  [address] [facility] [address]          To Whom It May Concern,     I am writing on behalf of my patient, Shaneka Alvarez, to document the medical necessity of doxepin for the treatment of insomnia. This letter provides information about the patient's medical history and diagnosis and a statement summarizing my treatment rationale.       Treatment Rationale  Ms Alvarez has a complex medical history significant for metastatic breast cancer on late line treatment, MS, chronic pain syndrome, migraines. She has insomnia not relieved with zolpidem or trazodone. In Palliative medicine, the standard of care for a patient in Ms Alvarez' situation would be to transition to doxepin.    I anticipate that Ms Alvarez will need doxepin to help manage insomnia for the remainder of her life.       Please call my office if I can provide you with any additional information to approve my request. I look forward to receiving your timely response and approval of this request.       Retail Pharmacy Prior Authorization Team   Phone: [phone]    PA Initiation    Medication: DOXEPIN HCL 3 MG PO TABS  Insurance Company: [facility] - Phone [phone] Fax [phone]  Pharmacy Filling the Rx: [facility] [address] [place], [place] - [address] [facility]  Filling Pharmacy Phone: [phone]  Filling Pharmacy Fax:    Start Date: 1/11/2024          PRIOR AUTHORIZATION DENIED    Medication: DOXEPIN HCL 3 MG PO TABS  Insurance Company: [facility] - Phone [phone] Fax [phone]  Denial Date: 1/11/2024  Denial Reason(s):         Appeal Information:           Again, thank you for allowing me to participate in the care of your patient.        Sincerely,        Alva Whitaker MD

## 2024-01-11 NOTE — TELEPHONE ENCOUNTER
PRIOR AUTHORIZATION DENIED    Medication: DOXEPIN HCL 3 MG PO TABS  Insurance Company: [employer] ([facility]) - Phone [phone] Fax [phone]  Denial Date: 1/11/2024  Denial Reason(s):         Appeal Information:

## 2024-01-11 NOTE — TELEPHONE ENCOUNTER
Retail Pharmacy Prior Authorization Team   Phone: [phone]    PA Initiation    Medication: DOXEPIN HCL 3 MG PO TABS  Insurance Company: [employer] ([facility]) - Phone [phone] Fax [phone]  Pharmacy Filling the Rx: [facility] [address] [place], [place] - [address] [facility]  Filling Pharmacy Phone: [phone]  Filling Pharmacy Fax:    Start Date: 1/11/2024

## 2024-01-12 NOTE — TELEPHONE ENCOUNTER
Retail Pharmacy Prior Authorization Team   Phone: [phone]    Medication Appeal Initiation-Initiated via RightFax    Medication: DOXEPIN HCL 3 MG PO TABS  Appeal Start Date:  1/12/2024  Insurance Company: [employer]  Insurance Phone:   Insurance Fax: [phone]  Comments:

## 2024-01-23 NOTE — TELEPHONE ENCOUNTER
"Patient sent MyChart requesting refill of Belbuca 600 mcg and 450 mcg films.  Please see my chart messaging back-and-forth.  Writer called patient to explain that it was too early to have either of these refilled and provided patient with the date that each could be filled on.  Patient became very defensive and said \"fine, I am not going to argue with you I am just going to take extra morphine.\"  Writer tried to explain that she was not wanting to argue with the patient but wanted to understand how the patient was taking the Belbuca.  Writer asked her to count her films and she had approximately 13 of the 600 mcg and 4 of the 450 mcg left.  When asked how she is taking these, patient would not converse with writer and said \"fine, I am tired of arguing and I am just going to die.\"  And said goodbye and hung up.    Will update Dr. Sherman Mckinney, RN  Palliative Care Nurse Clinician    [phone] (Direct)  [phone] (Main)  [phone] (Appointment Scheduling)    "

## 2024-01-24 NOTE — TELEPHONE ENCOUNTER
"Received phone call from patient. Reports she has been getting migraines after her weekly Taxol. After D1C1 she had 2 migraines that last 2 days each. After D8C1 she had a migraine from day 2 through day 7. Patient has history of migraines and uses Toradol and DHE injections. States she has tried these without relief. In bed most of the day to try sleep off headache.   Also c/o \"vomiting constantly\". She has been using all 3 antiemetics but still vomits about 12x/day. Eating very little but trying to keep up with fluids. She does not feel she is dehydrated. Reports urine is pale yellow and normal amounts. She is wondering if she should have her chemo today.  Discussed with Annie Bailon CNP. Recommends stopping Zofran    Patient in clinic today for port draw and infusion. Discussed with patient Zofran side effect of headaches. Recommended she try holding that this cycle and see if it prevents her from getting a headache.   Catarina Mendoza  RN, BSN, OCN      " no

## 2024-01-26 NOTE — TELEPHONE ENCOUNTER
I spoke to Lb at [facility] Appeals ([phone]). He states this # is for medical appeals, not pharmacy, but they did not receive the appeal. I have refaxed to [phone], per his request.

## 2024-01-27 NOTE — COMMUNITY RESOURCES LIST (ENGLISH)
01/27/2024   [facility] [place]  N/A  For questions about this resource list or additional care needs, please contact your primary care clinic or care manager.  Phone: [phone]   Email: N/A   Address: [address] [place] [address]   Hours: N/A        Financial Stability       Utility payment assistance  1  [facility] - Emergency Assistance - Utility payment assistance Distance: 2.81 miles      In-Person   [address] [place] [address]  Language: English, Bruneian  Hours: Mon 10:00 AM - 1:30 PM Appt. Only, Wed 10:00 AM - 1:30 PM Appt. Only, Thu 4:30 PM - 6:30 PM Appt. Only, Fri 10:00 AM - 1:30 PM Appt. Only  Fees: Free   Phone: (697) 379-5085 Email: info@[email or web address] Website: [email or web address]     2  [facility] - Emergency Assistance - Utility payment assistance Distance: 3.74 miles      In-Person   [address] [place] [address]  Language: English  Hours: Mon - Fri 8:00 AM - 4:30 PM  Fees: Free   Phone: (959) 353-3086 Email: [email or web address] Website: [email or web address]          Important Numbers & Websites       Emergency Services   911  [facility]   311  Poison Control   (523) 386-6507  Suicide Prevention Lifeline   (884) 461-2607 (TALK)  Child Abuse Hotline   (361) 896-8380 (4-A-Child)  Sexual Assault Hotline   (129) 768-8366 (HOPE)  National Runaway Safeline   (366) 200-4337 (RUNAWAY)  All-Options Talkline   (149) 386-1968  Substance Abuse Referral   (188) 148-2900 (HELP)

## 2024-01-30 NOTE — PROGRESS NOTES
Shaneka is a 63 year old who is being evaluated via a billable video visit.      How would you like to obtain your AVS? MyChart  If the video visit is dropped, the invitation should be resent by: Text to cell phone: [phone]  Will anyone else be joining your video visit? No          Assessment & Plan     Screen for colon cancer  Screening encouraged.  Given her concomitant conditions she may not elect to do so.  - Colonoscopy Screening  Referral; Future    Anxiety  Refilled medications as requested.  Recommend follow-up face-to-face in 3 months.  - venlafaxine (EFFEXOR XR) 75 MG 24 hr capsule; Take 3 capsules (225 mg) by mouth daily  - busPIRone (BUSPAR) 15 MG tablet; Take 1 tablet (15 mg) by mouth 2 times daily    Major depressive disorder, recurrent episode, moderate (H)  Follow-up in 3 months.  She is doing as well as can be expected with everything else going on in her life.  No new concerns today.  - venlafaxine (EFFEXOR XR) 75 MG 24 hr capsule; Take 3 capsules (225 mg) by mouth daily    Migraine without aura and without status migrainosus, not intractable  Follow-up in 3 months.  Doing as well as can be expected given her concomitant conditions.  - propranolol ER (INDERAL LA) 80 MG 24 hr capsule; 80 mg every morning and 160 at night    Subjective   Shaneka is a 63 year old, presenting for the following health issues:  Recheck Medication        1/30/2024    10:19 AM   Additional Questions   Roomed by miguel   Accompanied by alone         1/30/2024    10:19 AM   Patient Reported Additional Medications   Patient reports taking the following new medications none     History of Present Illness       Reason for visit:  Renew prescriptions    She eats 2-3 servings of fruits and vegetables daily.She consumes 0 sweetened beverage(s) daily.She exercises with enough effort to increase her heart rate 10 to 19 minutes per day.  She exercises with enough effort to increase her heart rate 4 days per week.   She is  taking medications regularly.         Objective    Vitals - Patient Reported  Pain Score: Extreme Pain (9)  Pain Loc:  (back)      Vitals:  No vitals were obtained today due to virtual visit.    Physical Exam   GENERAL: alert and no distress  EYES: Eyes grossly normal to inspection.  No discharge or erythema, or obvious scleral/conjunctival abnormalities.  RESP: No audible wheeze, cough, or visible cyanosis.    SKIN: Visible skin clear. No significant rash, abnormal pigmentation or lesions.  NEURO: Cranial nerves grossly intact.  Mentation and speech appropriate for age.  PSYCH: Appropriate affect, tone, and pace of words          Video-Visit Details    Type of service:  Video Visit     Originating Location (pt. Location): Home    Distant Location (provider location):  On-site  Platform used for Video Visit: Imtiaz  Signed Electronically by: Spenser Landeros PA-C

## 2024-01-31 NOTE — TELEPHONE ENCOUNTER
Called [phone]. Keira said I need to Call [phone]. She tried to transfer me, but then said they have a system outage and I need to call Them back! I refaxed the appeal to [phone].

## 2024-02-02 NOTE — TELEPHONE ENCOUNTER
I called the [phone] number I was told to call and again got medical claims. Xiomara told me they received the appeal on 1/25 and denied it on 1/28. She states a phone call with the decision was made to [phone]. I don't know where that phone number came from as it is not what was provided on any request. I have asked that the denial letter be faxed to me and verified the correct fax number.

## 2024-02-02 NOTE — TELEPHONE ENCOUNTER
MEDICATION APPEAL DENIED    Medication: DOXEPIN HCL 3 MG PO TABS  Insurance Company: [employer]  Denial Date: 1/28/2024  Denial Reason(s): The medication is specifically excluded from her plan. They have not overturned the original denial      Second Level Appeal Information: Second level appeals will be managed by the clinic staff and provider. Please contact the [employer] Prior Authorization Team if additional information about the denial is needed.

## 2024-02-02 NOTE — TELEPHONE ENCOUNTER
Resending prescription for doxepin liquid. Pt's insurance will not cover tablet form. Informed pt she may use a GoodRX coupon code but will need to get it filled at [facility]. She is agreeable with this plan.    HAKAN NorthN, RN  Palliative Care Nurse Clinician    [phone] (Direct)  [phone] (Main)  [phone] (Appointment Scheduling)

## 2024-02-07 NOTE — PROGRESS NOTES
Infusion Nursing Note:  Shaneka Alvarez presents today for C24D1 Doxil, IVF, Xgeva and ONPRO application.    Patient seen by provider today: Yes: Dr. Zepeda   present during visit today: Not Applicable.    Note: Patient reports feeling at her baseline and ready for treatment today.    Patient denies dental issues at this time.  Patient will inform dentist that she got Xgeva infusion, prior to any invasive dental work in the future.    Reminded patient to drink 6-8 glasses of water today, and tomorrow, to protect kidneys.     Intravenous Access:  Implanted Port.    Treatment Conditions:  Lab Results   Component Value Date    HGB 10.9 (L) 02/07/2024    WBC 6.0 02/07/2024    ANEU 22.4 (H) 07/27/2023    ANEUTAUTO 4.2 02/07/2024     02/07/2024        Lab Results   Component Value Date     02/07/2024    POTASSIUM 3.7 02/07/2024    MAG 2.0 03/12/2023    CR 0.75 02/07/2024    RAFAELA 8.6 (L) 02/07/2024    BILITOTAL 0.2 02/07/2024    ALBUMIN 3.6 02/07/2024    ALT 8 02/07/2024    AST 27 02/07/2024       Results reviewed, labs MET treatment parameters, ok to proceed with treatment.  ECHO/MUGA completed 4/20/23 EF 60-65%. Appears last echo before this one was 1 year prior. Per note from Halima JOE RN on 11/15/23, Dr. Zepeda did not want an updated ECHO at that time. Pt will see Dr. Zepeda in 4 weeks.       Post Infusion Assessment:  Patient tolerated infusion without incident.  Patient tolerated injection without incident.    ONPRO  Was placed on patient's: right side of abdomen.    Was placed at 1:30 PM    Podpal used: Yes    ONPRO injector device Lot number: [id number]    Patient education included: what patient can expect after application, what colored lights mean on the device, when to remove device, when and where to call with questions or issues, all patients questions answered, and that Neulasta administration will occur at 4:30 PM on 2/8.    Patient tolerated administration well.    Blood return noted pre and  post infusion.  Site patent and intact, free from redness, edema or discomfort.  No evidence of extravasations.  Access discontinued per protocol.       Discharge Plan:   Patient and/or family verbalized understanding of discharge instructions and all questions answered.  AVS to patient via [employer].  Patient will return 3/6 for next appointment. Future appts have been reviewed and crosschecked with appt note and plan.  Patient discharged in stable condition accompanied by: Nemesio.  Departure Mode: Ambulatory.      Francesca Canchola RN

## 2024-02-07 NOTE — LETTER
2/7/2024         RE: Shaneka Alvarez  [address] [address] [place] [address]        Dear Colleague,    Thank you for referring your patient, Shaneka Alvarez, to the [facility]. Please see a copy of my visit note below.    ONCOLOGY FOLLOW UP NOTE: 2/07/24        I took the history from reviewing the previous notes that she was following with Dr. Amaya.  I have copied and updated from prior notes.    ONCOLOGY History:    1.  January 2006:  Diagnosed with Stage IIB, T2 N1 M0 invasive lobular carcinoma of the right breast.  Final pathology showed a 4.5 x 3.8 x 2.5 cm, 01/14 lymph nodes positive.  Estrogen, progesterone receptor positive, HER-2 negative.     2.  Genetics.  BRCA1 and 2 mutations not detected. 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       THERAPY TO DATE:   1. 2006:  ECOG 2104 protocol of dose-dense Adriamycin, Cytoxan and Avastin x4 cycles followed by Taxol and Avastin x4 cycles.   2.  03/2007:  Completed 1 year Avastin.   3.  11/2006-01/2007:  Radiotherapy to the right breast of 5040 cGy.   4.  03/[id number]:  Aromasin.  Stopped after moving to the [facility].   5.  01/2016:  Right modified radical mastectomy with latissimus dorsi flap reconstruction and a left prophylactic mastectomy with latissimus dorsi flap reconstruction.     She recently had MRI of the brain as a follow-up for multiple sclerosis and there were multiple calvarial lesions noted which was suspicious for metastatic disease.  There was no evidence of new multiple sclerosis lesions.  She had a PET scan on 8/30/2019 which showed widespread bone metastatic lesions (calvarium, spine, sacrum, pelvis, ribs most prominent at C7, T3, L1 and L4), hypermetabolic 3 cm lesion in LLL, and mediastinal/hilar LN. CEA wnl at 1.9 and C27-29 elevated to 415 (28 on 8/23/16). A CT guided biopsy of the right iliac bone was obtained on 9/4/19, pathology consistent with metastatic adenocarcinoma (breast), ER/MD  negative, HER-2 negative PDL 1 < 1%. A second biopsy was take of the LLL nodule via EBUS on 9/5/19 did not show any malignancy.    C/T/L spine MRI 8/3/19 to 9/2/19 with numerous enhancing lesions of the C, T and L spine, largest around T9 and L1. No evidence of cord compression. Has a L1 compression fracture and impending L4.     Received radiation T12-L5 3000 cGy in 10 fractions from 9/6/2019 till 9/18/2019.  Neurosurgery recommended no surgical intervention but to wear Troy brace when out of bed and HOB >30 degrees    She started palliative Xeloda 2000/1500 on 10/1/2019 but after just a few doses she noticed nausea, red eyes blurred vision, loss of appetite.  She stopped taking it after 5 doses and was seen by nurse practitioner on 10/7/2019 when she was improving.  At that point she was restarted on Xeloda at a lower dose of 1000 mg twice a day.  As she was not able to tolerate even the lower dose with extreme nausea and vomiting and feeling extremely fatigued and blurry vision, we decided on stopping Xeloda.    We decided on repeating scans and MRI brain on 10/25/2019 showed no intracranial metastatic disease.   Multiple enhancing calvarial lesions may be increased in number and are suggestive of metastatic disease. Thinner imaging on the current study may identify the smaller lesions and may be responsible for  identified more lesions.   There is a single focus of T2 hyperintense signal within the anterior right temporal lobe may represent interval demyelination. There is no evidence for active demyelination.    PET/CT on 11/1/2019 showed favorable response to therapy and Overall FDG uptake within scattered osseous metastases is decreased since 8/30/2019, particularly within the pelvis. Increased sclerotic appearance of L1 and L4 pathologic compression deformities, likely sequela of recently completed palliative radiation therapy.    No significant FDG uptake in previously demonstrated hypermetabolic  mediastinal lymph nodes. Biopsy negative on 9/5/2019.  There is also decreased FDG uptake in the left lower lobe (biopsy negative for  malignancy), now with dense consolidation containing air bronchograms suggestive of infection versus aspiration.  There is diffuse FDG uptake within the esophagus, consistent with esophagitis.    Because of intolerance to Xeloda we decided on switching to weekly paclitaxel on 11/5/2019.    C#2 Taxol 12/4/19- started with 70mg/m2 dose reduction    C#3 Taxol 12/30/2019     PET/CT on 1/24/2020 showed progression of the disease in some of the bone lesions including increase in size and lytic lesion within the vertebral body of C4 measuring 1 cm as opposed to 0.6 cm previously and a new central soft tissue nodule with SUV max 4.1 when previously it was non-hypermetabolic.  There is also some progression noted in the right proximal femur and right iliac bone.  There is decreased metabolic uptake in the right lamina of T9 with SUV max 4.7 when previously it was 8.0.  Other lesions are stable.    CA 27-29 also had increased significantly and it was 257 on 1/28/2020.    We decided on switching to eribulin on 1/28/2020.    C#2 2/18/2020  C#2 D#8 2/25/2020    C#3 D#1 3/18/2020 -delayed by 1 week as per patient's preference because she wanted to visit her family.    She had a repeat PET/CT on 3/27/2020 which showed stable size of the bone metastatic lesions although the FDG avidity was less, consistent with treatment response.    She had MRI of the thoracic spine on 4/3/2020 and it was compared to the one which was done in August 2019 and it showed increased size of several metastatic lesions most notably at T9 but also at T5-T7.  Other lesions at T10, T11 and T12 appeared improved.    CA 27-29 was also down to 222 on 3/18/2020.    Cycle #4 eribulin ( dose reduced to 1.2 mg/m2 )-4/7/2020-   Cycle #5 Eribulin ( 1.2 mg/m2 )- 4/28/2020   Cycle #6 Eribulin ( 1.2 mg/m2 )- 5/19/2020     Cycle #7  Eribulin ( 1.2 mg/m2 )- 6/9/2020    Cycle #8 Eribulin ( 1.2 mg/m2 )- 6/30/2020     She had a repeat PET scan on 7/17/2020 which showed incidental finding of new acute bilateral pulmonary embolism in the distal left main pulmonary artery extending in the left upper and lower lobes and in the segmental and branch arteries in the right lower lobe.    It also showed mildly increased FDG avidity in the lytic lesion in the T9 lamina and right pedicle with SUV max 5.3, previously 3.9.  That is also slightly increased FDG uptake to the left anterior fifth rib at the costochondral junction SUV max 3.9, previously 2.5.  There is slightly decreased FDG uptake in the right femoral neck lesion SUV max 2.2, previously 2.9.  FDG uptake in other bone lesions is fairly stable.  No new metastasis are seen.    CA 27-29 was 308 on 6/30/2020.  It was 286 on 5/19/2020.    Overall this is consistent with only slight progression versus stable disease.    She was started on Lovenox.    Cycle #9 eribulin 7/21/2020. CA 27-29 was 238    C#10 eribulin 8/18/2020. ( delayed by one week for ANC 0.9 )    C#11 Eribulin 9/8/2020    C#12 Eribulin 9/29/2020     C#13 Eribulin 10/20/2020     PET scan on 11/6/2020 shows progression of the disease with increased FDG uptake in the lytic lesions in the T9/T10 and right posterolateral elements as well as increased FDG uptake in the previously known hypermetabolic right iliac bone lesion suggesting recurrence of previously treated metastasis.  There is new subtle lesion in the right manubrium.  There is also a new fracture in the anterolateral left fourth rib with associated uptake and this could be a pathologic fracture.  Healing fracture in the left anterior fifth rib lesion.  There is resolution of the left pulmonary emboli.  Decreased FDG uptake of the esophagus consistent with improving inflammation.    CA 27-29 on 10/20/2020 was also elevated at 489.    C#1 Trodelvy on 11/11/2020.  Cycle #2 Trodelvy  12/2/2020  Cycle number 2-day #8 Trodelvy 12/8/2020.    12/15/2020-12/16/2020.  She was admitted to the hospital with nausea vomiting and dehydration.  She had tachycardia and initial lactic acid of 5.  It decreased to 1.4 after 2 L of normal saline.  She also had hypokalemia and ANC was 1.3.  She was treated with IV fluids.  Procalcitonin was negative.  CTA of the chest was negative for pulmonary embolism or pneumonia.  No bacterial infection was documented.  Her medication which she was initially unable to tolerate at home, were restarted and she was discharged home once started to feel better.      We delayed the start of cycle number 3 x 1 week and decrease the dose of chemotherapy by 25%.  She also had neutropenia with ANC of 1.0.      She started cycle number 3-day #1 on 12/29/2020.  CA 27-29 was 392.    Cycle number 3-day #8 1/5/2021.    C#4 Trodelvy 1/20/2021- 75% dose    2/5/2021.  PET/CT overall shows a good response to treatment with improvement of previously hypermetabolic lesions in the spine and pelvis and stability of other bone meta stasis.  There is a single lytic lesion in the right iliac bone which demonstrates slight Yimi increased FDG uptake and has SUV max 4.7 while previously it was SUV max 3.4.  There was diffuse wall thickening of the esophagus with uptake in the esophagus in the fundus of the stomach and this could be seen with inflammation.    Trodelvy cycle #5 - 2/10/2021.  75% of the dose.  CA 27-29 was 337.    Trodelvy cycle #6 - 3/3/2021.  75% of the dose.  Trodelvy cycle #6, D#8 - 3/10/2021.  75% of the dose.    Trodelvy C#8, D#8 on 4/21/2021  CA 27-29 stable at 371 on 4/14/2021    Trodelvy, cycle #9- 5/5/2021        On 2/25/2020 when she had biopsy of the right acetabulum and it was consistent with metastatic lobular carcinoma.  Again it was Triple Negative.     On 3/18/2020 when she also met with Dr. Arelis Arshad who also advised testing for androgen receptor studies on the biopsy  specimen.  Tumor was diffusely androgen receptor positive.      5/26/2021-cycle #10 Trodelvy     CA 27-29 was 545.    6/11/2021.  PET/CT shows mildly increased uptake in several bone lesions for example in the left anterior iliac crest, mid right iliac crest and left ischium.  Uptake in other bone lesions are similar to previously.    Because of minimal progression of the disease and she is tolerating chemotherapy very well, we decided on continuing the same chemotherapy.    6/16/2021-Trodelvy cycle #11    7/7/2021 -Trodelvy cycle #12    9/14/2021-Trodelvy-cycle #15, day #8.    9/8/2021-CA 27-29 was more elevated and it was 1176.    PET/CT on 9/24/2021 showed stable findings with no evidence of FDG avid metastatic disease within the body.  Left axillary lymph nodes are prominent and hypermetabolic and likely from recent vaccinations in the left deltoid muscle.  Healed bony metastasis seems stable.      Cycle #16, Trodelvy 9/28/2021.  Cycle #17, day #8 Trodelvy was on 10/26/2021.  Cycle #18, day #8 Trodelvy on 11/22/2021       She saw Dr. Mejia whom on 10/6/2021.  She was not considered eligible for Enfortumab trial.    Cycle #19, Trodelvy on 12/7/2021 12/21/2021.  PET/CT showed progression of bony metastatic disease.  There is increase hypermetabolism in both the right and left iliac bones and the sternum.  Other bone lesions are stable.    There is new scattered areas of groundglass throughout both the lungs and these findings are indeterminate but may represent an infectious etiology.  Interval resolution of left axillary FDG avid lymphadenopathy.    She was started on Casodex 150 mg daily on 1/6/2022.    She was admitted to the hospital from 3/17/2022-3/19/2022 for vomiting and diarrhea and  severe back pain.  I reviewed the discharge summary.  CT lumbar spine showed a stable severe chronic L1 pathologic compression fracture.  Stable mild compression deformity of superior endplate of L3 and superior endplate  of L4.  The upper margin of the L4 fracture extends slightly into the spinal canal without high-grade canal stenosis and this is also stable.  Nondisplaced fractures coursing through the L3 pedicles bilaterally were seen which were not clearly seen previously this could be acute and likely pathologic.  No extraspinal soft tissue abnormality.  Neurosurgery recommended conservative management.  Her pain was controlled and she was discharged home as she felt better with this.      3/31/2022-PET/CT shows progressive bone meta stasis with progressive FDG uptake in the following lesions. Left iliac crest lesion with max SUV of 7.2, previously 3.5. Right mid iliac crest lytic lesion shows maximum SUV of 7.8, previously 6.9.  T10 vertebral body mixed lytic and sclerotic lesion with an SUV of 7.1, previously not hypermetabolic. Thoracic vertebral bodies 8, 7, 5, and 4 also show hypermetabolic lesions were previously there was no hypermetabolism.  Some of the sclerotic osseous lesions are unchanged and do not show increased hypermetabolism compatible treated lesion such as a severe compression deformity at L1 as well as superior compression deformity at L4.  Medial right clavicle sclerotic lesion with SUV max of 4.8, previously not hypermetabolic. There is also right-sided sternal lesions.      4/14/2022--C#1- switch to single agent Doxil 50 mg per metered squared every 4 weeks.    5/13/2022-cycle #2- Doxil- dose reduced to 40 mg per metered square due to severe nausea/vomiting and migraines requiring IV fluids and IV antiemetics.    6/10/2022-cycle #3-Doxil 40 mg per metered square    7/1/2022- PET/CT shows resolution of the hypermetabolic skeletal metastasis.    7/6/2022-cycle #4-Doxil 40 mg per metered square  8/5/2022- cycle #5-Doxil  9/1/2022.  Cycle #6-Doxil  9/30/2022- cycle #7-Doxil    10/18/2022.  PET/CT does not show evidence of any FDG activity    11/2/2022-cycle #8-Doxil.  11/30/2022-cycle  #9-Doxil.  12/28/2022-cycle #10-Doxil    2/3/2023- C#11- Doxil (delayed by 1 week because neutrophil count was 0.9)-wanted to give her Onpro but at that time it was not approved by insurance.    3/3/2023.  Cycle #12 Doxil.  Given with Onpro.    Repeat PET/CT on 3/27/2023 overall showed very stable findings.  PET/CT showed focal FDG avidity in the heart. This is in an expected location of AV node or mitral valve.  This is nonspecific.  We checked an echocardiogram on 4/20/2023 which was unremarkable.  She is asymptomatic.  Continue to monitor clinically.      3/31/2023.  Cycle #13 Doxil.  Given with Onpro.     4/12/2023-she presented to ED with increased migraine headaches and nausea and vomiting.  She was treated symptomatically and was discharged home after she started to feel better.      4/28/2023.  Cycle #14 Doxil.  Given with Onpro    Cycle #16 Doxil with Onpro 6/23/2023    Cycle #17 Doxil with Onpro 7/21/2023      CA 15-3 has been slowly rising.    Repeat PET/CT on 8/8/2023 showed pretty stable findings.    She has been having more pain in the lumbar spine/low back.    8/18/2023.  Cycle #18 Doxil with Onpro    9/15/2023.  Cycle #19 Doxil with Onpro.    10/16/2023.  Cycle #20 Doxil with Onpro.    11/10/2023.  PET/CT overall showed pretty stable findings.    11/15/2023.  Cycle #21 Doxil with Onpro    12/13/2023.  Cycle #22 Doxil with Onpro     1/10/2024.  Cycle #23 Doxil with Onpro     2/7/2024.  Cycle #24 Doxil with Onpro is planned      Interval history.    She tolerated last chemotherapy well.  Pain is under fair control.  Currently taking Belbuca 600 mcg in the morning and 450 mcg in the evening.  She takes morphine as needed.  On average she takes 1-2 morphine in the day.  She has mild occasional nausea but no vomiting.  No diarrhea or constipation.  Migraines are in good control.  Denies new swellings.  No fevers shortness of breath or infections.  Denies any change in mild neuropathy.        ECOG  1    ROS:  Rest of the comprehensive review of the system was negative.        I reviewed other history in epic as below.       PAST MEDICAL HISTORY:     1.  Breast cancer  2.  Multiple sclerosis.   3.  Depression.   4.  Migraines.   5.  Hypertension.   6.  Cholecystectomy and umbilical hernia repair.     SOCIAL HISTORY: She smoked for 5 years but quit many years ago.  Drinks alcohol socially.  She lives with her .  She is a teacher in middle school.       FAMILY HISTORY: She mentions that her mother had breast cancer at 49.  Both grandmothers had breast cancer but she is not sure of the age.  A couple of cousins have cancers.  Patient has 3 children who are healthy.    Current Outpatient Medications   Medication     apixaban ANTICOAGULANT (ELIQUIS ANTICOAGULANT) 5 MG tablet     Buprenorphine HCl (BELBUCA) 450 MCG FILM buccal film     Buprenorphine HCl (BELBUCA) 600 MCG FILM buccal film     busPIRone (BUSPAR) 15 MG tablet     calcium citrate-vitamin D (CITRACAL) 315-250 MG-UNIT TABS     Cholecalciferol (VITAMIN D3 PO)     doxepin (SINEQUAN) 10 MG/ML (HIGH CONC) solution     galcanezumab-gnlm (EMGALITY) 120 MG/ML injection     LORazepam (ATIVAN) 0.5 MG tablet     metoclopramide (REGLAN) 5 MG tablet     morphine (MSIR) 15 MG IR tablet     OLANZapine (ZYPREXA) 5 MG tablet     ondansetron (ZOFRAN ODT) 4 MG ODT tab     propranolol ER (INDERAL LA) 80 MG 24 hr capsule     propranolol ER (INDERAL LA) 80 MG 24 hr capsule     ubrogepant (UBRELVY) 100 MG tablet     venlafaxine (EFFEXOR XR) 75 MG 24 hr capsule     vitamin B-2 (RIBOFLAVIN) 25 MG TABS tablet     ZOLMitriptan (ZOMIG) 5 MG nasal spray     No current facility-administered medications for this visit.     Facility-Administered Medications Ordered in Other Visits   Medication     heparin 100 unit/mL injection 5 mL     sodium chloride (PF) 0.9% PF flush 10 mL        Allergies   Allergen Reactions     Bactrim [Sulfamethoxazole W/Trimethoprim]      Internal  "bleeding     Copaxone [Glatiramer] Hives          PHYSICAL EXAMINATION:     BP (!) 158/99 (BP Location: Left arm, Patient Position: Chair, Cuff Size: Adult Regular)   Pulse 61   Temp 98  F (36.7  C) (Oral)   Ht 1.575 m (5' 2\")   Wt 54 kg (119 lb)   SpO2 98%   BMI 21.77 kg/m    Wt Readings from Last 4 Encounters:   02/07/24 54 kg (119 lb)   01/10/24 54.2 kg (119 lb 6.4 oz)   01/09/24 54.4 kg (120 lb)   01/03/24 54.4 kg (120 lb)       CONSTITUTIONAL: No apparent distress  EYES: PERRLA, without pallor or jaundice  ENT/MOUTH: Ears unremarkable. No oral lesions  CVS: s1s2 normal  RESPIRATORY: Chest is clear  GI: Abdomen is benign  NEURO: Alert and oriented ×3  INTEGUMENT: no concerning skin rashes.  Scalp cyst is about the same as before.  LYMPHATIC: no palpable lymphadenopathy  MUSCULOSKELETAL: Unremarkable. No bony tenderness.   EXTREMITIES: no pedal edema  PSYCH: Mentation, mood and affect are appropriate        Labs and Imaging.    Reviewed.    2/7/2024.  CBC shows WBC 6.  Hemoglobin 10.9.  Platelets 178.  ANC 4.2.  Chemistry shows calcium 8.6.  Total protein 6.1.      CA 15-3 was 452 on 1/10/2024    CA 15-3 was 408 on 12/13/2023    CA 15-3 was 287 on 11/15/2023    CA 15-3 was 237 on 10/16/2023    CA 15-3 was 242 on 9/15/2023    CA 15-3 was 193 on 8/18/2023 7/21/2023.    CA 15-3 was 186.    7/3/2023    CA 15-3 was 177 on 6/23/2023      CA 15-3 was 129 on 3/31/2023      CA 15-3 was 123 on 3/3/2023.      11/30/2022     CA 15-3 was 135.    9/30/2022  CA 15-3 was 159.  CA 27-29 was 1992 on 6/10/2022  CA 27-29 was 3370 on 5/13/2022.  It was 5307 on 4/14/2022 ferritin Doxil was started.      9/28/2021.      Iron studies, ferritin is 101, iron 51, TIBC 268 and iron saturation index 19%.        PET/CT on 11/10/2023  COMPARISON: PET 8/8/2023        FINDINGS:      HEAD/NECK:  There is no suspicious FDG uptake in the neck.     The paranasal sinuses are clear. The mastoid air cells are clear.      No hypermetabolic " lymph nodes are demonstrated in the neck.      The mucosal and deep spaces of the neck are unremarkable. The major  salivary glands are unremarkable. The thyroid is unremarkable. The  major vasculature of the neck are patent.     CHEST:  Bilateral mastectomies with breast implants.  There is no suspicious FDG uptake in the chest.     The central tracheobronchial tree is clear. No pleural effusion or  pneumothorax. No acute consolidation.      No hypermetabolic lymph nodes are demonstrated in the chest.     Heart size is within normal limits. No pericardial effusion. The  thoracic aorta and main pulmonary artery are within normal limits for  diameter. The esophagus is unremarkable.      ABDOMEN AND PELVIS:  There is no suspicious FDG uptake in the abdomen or pelvis.     The liver is unremarkable. Cholecystectomy clips. No intrahepatic or  extrahepatic biliary ductal dilatation. The pancreas is unremarkable.  No pancreatic ductal dilatation. The spleen is unremarkable. The  adrenal glands are unremarkable.     Symmetric enhancement of the kidneys. No hydronephrosis. The urinary  bladder is unremarkable. Uterus is unremarkable. Ovaries are absent.  No hypermetabolic lymph nodes are demonstrated in the abdomen or  pelvis.     Normal caliber of the small and large bowel. No free air, free fluid,  or fluid collection. Normal caliber of the abdominal aorta.     LOWER EXTREMITIES:   There is no suspicious FDG uptake in the visualized lower extremities.     BONES: Again demonstrated are numerous sclerotic and lytic lesions  some of which may remains mildly hypermetabolic. FDG uptake in bone  metastases are not substantially changed, with a few lesions showing  mild FDG increases, others decreased, and others not significantly  changed. Examples include:     Decreased FDG:    T8 vertebral body max SUV 3.1 (prior 4.3).  T9 left posterior lytic lesion max SUV 3.1 (prior 3.4)     Increased FDG:   A left ninth rib costovertebral  chondral junction max SUV 3 (prior  2.6.)  Right sixth rib anteriorly max SUV 3.8 prior 2.7.     Not significantly changed:  Right second rib anterolateral focus of hypermetabolism max SUV 3.9,  not significantly changed from prior.   Right iliac bone max SUV 3.6 (prior 3.9).     Multiple old right rib fractures. Left 10th rib the deformity with  callus. A severe compression deformity of L1 unchanged.                                                                      IMPRESSION: In this patient with history of breast cancer:  Overall  stable disease.  Again demonstrated are numerous metastatic bone lesions, some of which  have mildly elevated FDG activity. Some have increased slightly,  others have decreased slightly,  others are not significantly changed.      CT lumbar spine on 8/28/2023  IMPRESSION:  1.  No evidence for acute displaced fracture. Fractures of the L1, L3 and L4 vertebral bodies appear stable from 07/31/2023.  2.  No evidence of traumatic subluxation.  3.  Diffuse osseous metastatic disease.    MRI of the lumbar spine on 7/31/2023  COMPARISON: PET/CT 03/27/2023.  TECHNIQUE: MRI Lumbar Spine without IV contrast.     FINDINGS: Examination is mildly limited by patient motion artifact.     Alignment is unchanged. Redemonstrated heterogeneous bone marrow signal throughout the visualized lumbosacral spine including numerous focal abnormal T1 hypointense and STIR hyperintense marrow replacing lesions, including confluent lesions at T12 and S2   vertebrae. Similar chronic L1 pathologic burst fracture with mild superior endplate retropulsion and severe anterior-central vertebral body height loss. Likewise, similar chronic L4 superior endplate fracture with associated mild retropulsion. Large   Schmorl's node at the L2 inferior endplate, as before. Vertical defects in the bilateral L3 pedicles are redemonstrated compatible with chronic pathologic fractures. Asymmetric STIR hyperintense signal abnormality in  the left sacral ala partially   visualized, which may represent additional confluent pathologic marrow replacement. Otherwise, remaining lumbar vertebral body heights are maintained. Conus signal is normal and terminates at L2. No perivertebral soft tissue edema. There is mild edema   within the bilateral multifidus and erector spinae muscles, which may indicate posttraumatic injury/strain. Superficial subcutaneous soft tissue edema may be posttraumatic or related to positioning. Prominence of bile duct may be related to   postcholecystectomy physiologic ectasia. Small/subcentimeter right renal cyst. Last fully formed disc is designated L5-S1. Multilevel disc degeneration and height loss, severe at L5-S1.     T12-L1: No disc without bulge or protrusion. Facet joints are normal. Mild L1 superior endplate retropulsion indents the ventral thecal sac without spinal canal stenosis. No foraminal stenosis.     L1-L2: No disc without bulge or protrusion. Facet joints are normal. No spinal canal or foraminal stenosis.     L2-L3: Bilobed left asymmetric diffuse disc bulge. Mild facet arthropathy with ligamentum flavum thickening. Mild left foraminal stenosis. No right foraminal or spinal canal stenosis.     L3-L4: Bilobed left asymmetric diffuse disc bulge. Mild facet arthropathy with ligamentum flavum thickening. In conjunction with superior endplate retropulsion, there is borderline mild central spinal canal stenosis and mild left subarticular recess   narrowing. Mild right and moderate left foraminal stenosis.     L4-L5: Small left foraminal disc protrusion. Moderate facet arthropathy. No spinal canal or foraminal stenosis.     L5-S1: No disc without bulge or protrusion. Facet joints are normal. No spinal canal or foraminal stenosis.                                                                      IMPRESSION:  1.  Extensive osseous metastases as well as chronic pathologic fractures at L1, L3, and L4, as detailed.  2.   Partially visualized asymmetric signal abnormality in the left sacral ala may represent confluent pathologic marrow replacement. Marrow edema related to sacral fracture is difficult to exclude in the appropriate clinical context.  3.  Posterior paraspinous muscular edema can be compatible with posttraumatic injury/strain.  4.  Multilevel degenerative changes, as detailed.        7/6/2023.  MRI brain  IMPRESSION:  1.  No acute ischemia, mass/mass effect, or abnormal intracranial enhancement.   2.  Similar sequela from demyelinating disease.  3.  Osseous metastases, as before.      Echocardiogram 4/20/2023  Left ventricular size, wall motion and function are normal. The ejection  fraction is 60-65%.  Global right ventricular function is normal.  No hemodynamically significant valve abnormalities.  The inferior vena cava is normal.  No pericardial effusion.     This study was compared with the study from 4/7/22. No significant change.      3/27/2023.  PET/CT  1. No new suspicious FDG uptake within the skeleton.  2. Stable numerous non-FDG avid lytic and sclerotic osseous lesions  throughout the axial skeleton.  3. Stable nondisplaced fracture of the left acromion/glenoid, chronic  bilateral healing rib fractures, and compression deformities of the  thoracic and lumbar spine.  4. Hypermetabolic focus in the expected location of the AV node versus  mitral valve, this may represent a normal AV node versus mitral valve  pathology such as vegetations. Recommend follow-up with cardiac  Ultrasound.        11/30/2022-x-ray of the ribs of the left side  There are a few sclerotic lesion seen in the right anterior ribs that were seen on previous PET CTs. These appear to involve the fourth and fifth ribs. There are likely more subtle lesions that were   described on the PET CT scan. Irregularity of the distal end of the left ninth rib may be from metastatic disease or fracture on the oblique view which likely is chronic.        10/18/2022-PET/CT showed no new suspicious FDG uptake within the skeleton.  Stable abnormal FDG avid lytic and sclerotic osseous lesions.  Mild diffuse FDG uptake throughout the large bowel without CT abnormality.    7/1/2022.  PET/CT shows diffuse sclerotic and lytic osseous lesions without any abnormal FDG uptake in the skeleton, consistent with treated disease.  There is evaluation of previous hypermetabolic bone lesions.  Multiple chronic rib fracture deformities and stable compression fracture deformities of T6, L1 and L4.  No suspicious FDG uptake in the chest abdomen or pelvis.    3/31/2022-PET/CT shows progressive bone metastasis with progressive FDG uptake in the following lesions. Left iliac crest lesion with max SUV of 7.2, previously 3.5. Right mid iliac crest lytic lesion shows maximum SUV of 7.8, previously 6.9.  T10 vertebral body mixed lytic and sclerotic lesion with an SUV of 7.1, previously not hypermetabolic. Thoracic vertebral bodies 8, 7, 5, and 4 also show hypermetabolic lesions were previously there was no hypermetabolism.  Some of the sclerotic osseous lesions are unchanged and do not show increased hypermetabolism compatible treated lesion such as a severe compression deformity at L1 as well as superior compression deformity at L4.  Medial right clavicle sclerotic lesion with SUV max of 4.8, previously not hypermetabolic. There is also right-sided sternal lesions.      Biopsy from the right acetabulum shows metastatic lobular carcinoma.  It is triple negative.  Androgen receptor was diffusely positive (90%, moderate intensity) by immunohistochemistry. )  PD-L1 negative.    Foundation one showed CDH1 mutation (initially lobular cancer), CCND1 amplification ( equivocal ). FGF3, FGF4, FGF19 amplification ( Equivocal ). Most promising is CCND1 amplification with abemaciclib showing response in 4/10 patients. FGF3,FGF4 and FGF19 are targetable with pan-FGFR inhibitors.     ASSESSMENT AND  PLAN:     Metastatic breast cancer negative breast cancer (androgen receptor positive ) with extensive involvement of the bones.  There is also a left lower lobe hypermetabolic lesion and mediastinal lymph nodes which are hypermetabolic.  The biopsy from the right iliac bone is consistent with metastatic lobular breast cancer but it is hormone receptor and HER-2 negative and PDL 1 is also negative.    Foundation 1 testing did not reveal any actionable mutations.    She was intolerant to Xeloda and progressed on Taxol and eribulin.      We then switched to recently FDA approved sacituzumab govitecan-hziy (Trodelvy) for TNBC, based on the results of the phase II IMMU-132-01 clinical trial.   She started this on 11/11/2020.      She obtained a second opinion from Dr. Castillo from [facility] who recommended a repeat biopsy to be done to make sure that she really has triple negative breast cancer.      She also met with Dr. Arelis Arshad on 3/18/2021.      After 10 cycles of Trodelvy, she had PET/CT on 6/11/2021 which showed mildly increased uptake in some of the bone lesions while stability in other bone lesions.        After 15 cycles, repeat PET scan showed stable findings.  CA 27-29 has been increasing and it is 1176.      As she was tolerating the chemotherapy well and her quality of life has been decent and scans were stable although she had a rising CA 27-29, we decided on continuing the same chemotherapy because it has been a very slow progression of the disease.    Then she had clear progression of the disease in the bones with increased FDG uptake in both right and left iliac bones and the sternum.    Foundation one showed CDH1 mutation (initially lobular cancer), CCND1 amplification ( equivocal ). FGF3, FGF4, FGF19 amplification ( Equivocal ). Most promising is CCND1 amplification with abemaciclib showing response in 4/10 patients. FGF3,FGF4 and FGF19 are targetable with pan-FGFR inhibitor    As the  tumor is diffusely positive for androgen receptor, we decided to start her on androgen receptor blockers.    I initially recommended enzalutamide 160 mg daily ( Enzalutamide for the Treatment of Androgen Receptor-Expressing Triple-Negative Breast Cancer----J Clin Oncol. 2018 Mar 20; 36(9): 884-890. ).    Eventually we decided to start her on Casodex 150 mg daily instead of Enzalutamide due to cost issues.  Clinical Trial Clin Cancer Res. 2013 Oct 1;19(19):5505-12.   Phase II trial of bicalutamide in patients with androgen receptor-positive, estrogen receptor-negative metastatic Breast Cancer  She started Casodex on 1/6/2022.    She had progressive disease in the bones on the PET scan on 3/31/2022.    We changed to single agent Doxil on 4/14/2022.      She developed significant nausea vomiting and headache so cycle #2 was given on 5/13/2022 with 20% dose reduction which she tolerated well.    Cycle #3 was given on 6/10/2022 with the same dose reduced Doxil.    Repeat PET/CT on 7/1/2022 shows resolution of the FDG avid bony metastasis consistent with treated disease.  No suspicious FDG uptake was seen in the chest abdomen and pelvis.  CA 27-29 was also coming down.    CA 15-3 has been slowly rising.    Repeat PET/CT on 8/8/2023 showed pretty stable findings.    She has been having more pain in the lumbar spine/low back.    Otherwise tolerating chemotherapy well.  As the progression has been very slow and mild and overall she has been tolerating chemotherapy well, we decided to continue the same chemotherapy.    Over time CA 15-3 has continued to slowly increase but the PET scan on 11/10/2023 overall showed very stable findings.    We decided to continue Doxil for now.    Overall chemotherapy is going well.  She will proceed with cycle #24 Doxil today.      We will repeat PET/CT before next chemotherapy.          In the future we can potentially consider another chemotherapy like carboplatin or  gemcitabine.    Chemotherapy-induced neutropenia.    Continue Onpro support.    Anemia.  She has mild anemia from chemotherapy.  Continue to observe.      Bone disease.  She has metastatic disease to the bone.  Previously she got palliative radiation to T12-L5 3000 cGy in 10 fractions from 9/6/2019 till 9/18/2019.  She was evaluated by orthopedics Dr. Bipin Elliott on 11/1/2019 and conservative management was recommended.    She was on Zometa every 3 months. She last received on 9/29/2020.  Because of progression of the disease in the bones, we switched to Xgeva which she received on 11/11/2020.    She had progression of the disease in the bones so we switched to Doxil but we continued xgeva.  PET scan on 7/1/2022 does not show any suspicious FDG uptake in the bones consistent with treated disease.  Repeat PET/CT in March 2023 was without any active FDG avid lesions.  Overall PET scan from 11/10/2023 remains pretty stable with slight decrease in FDG avidity of some lesions was a slight increase in some but overall showing pretty stable findings.  Continue calcium and vitamin D and continue monthly Xgeva.  She will get Xgeva today.      Cancer related pain.  She was also seen by Dr. Yusuf from pain clinic and she got bilateral SI joint steroid injections on 10/13/2023 which did not help much.  She recently saw Dr. Whitaker again who increased buprenorphine buccal film to 600 mcg in the morning and 450 mcg in the evening.  She takes morphine as needed.  Overall with this regimen pain is under fair control.      Nausea.  She has mild occasional nausea.  Take Zofran as needed.     Migraines.  Doing much better with Emgality.  She takes as needed Ubrelvy which helps.  She is following with neurology.      Bilateral pulmonary emboli.  Continue Eliquis.      Elevated blood pressure.    Her blood pressure in the clinic was elevated.    She is asymptomatic.  I recommend that she checks her blood pressures at home  regularly when she is relaxed and keep a log of it.  If it is persistently high then she should talk to her primary care physician for better blood pressure management.  I am not starting any antihypertensive today.        We did not address the following today.      Shingles.  The rash has dried out.  She completed well with acyclovir and prednisone.  She takes gabapentin for postherpetic neuralgia and is doing better.      Neuropathy.  This remains mild and stable with neuropathy in her hands.    This is in addition to the chronic balance issues and heat and cold sensitivity from underlying multiple sclerosis which is also stable for years.  Continue to monitor.     Subcutaneous nodule in the scalp.  This looks like an epidermoid cyst.  She thinks it is a stable.  Continue to monitor.       Insomnia.  Continue trazodone.      Wall thickening of esophagus and FDG uptake of fundus of the stomach.  It could be in the setting of inflammation from recent nausea and vomiting.  Symptoms have resolved.  Continue to monitor clinically.    Vision changes.    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  It was thought that this could be due to Taxol as patient reported to the ophthalmologist that she was on Taxol in September 2019 when her symptoms started.  But she was not on Taxol in September 2019 when she started noticing the visual changes so Taxol is not responsible for this.  The first dose of Taxol was on 11/5/2019.   She had left cataract extraction on 2/8/2021 and she has noticed significant improvement in her vision.  Thinks that her vision is back to her usual.      Increased FDG ability in the colon.  She had FDG uptake in the cecum and ascending colon but no corresponding lesion was seen on the CT scan.  She is completely asymptomatic so at this time we will continue to observe.    Discussion regarding healthcare directives.  On previous visit she told me that she will bring  the health care directive for our records but she forgot so I told her to bring it on next visit.      Breast implant removal.  She tells me that she was planning to do breast implant removal because there was a recall on this.  Her surgery was scheduled for 9/24/2019.  Because of this new development of metastatic breast cancer and her recent radiation, surgery has been canceled.  We discussed that at this time treatment for metastatic breast cancer would take precedence over the other surgery as the other surgery would require her to be off chemotherapy for several weeks and in that case the chances of cancer progression would be high and I would not recommend that.    Multiple sclerosis.  She will continue to follow with her neurologist Dr. Quiles.  Currently multiple sclerosis is under control and currently she is not taking any medications.    I will see her back in 4 weeks    All of her questions were answered to her satisfaction.  She is agreeable and comfortable with the plan.    Dewayne Zepeda MD          Again, thank you for allowing me to participate in the care of your patient.        Sincerely,        Dewayne Zepeda MD

## 2024-02-07 NOTE — PROGRESS NOTES
See IV flow sheet for details of port access. Labs sent:cbc, cmp, ca15-3. Port flushed per protocol and needle left in place for chemotherapy to follow provider visit.    Keira Brooks RN

## 2024-02-07 NOTE — PROGRESS NOTES
ONCOLOGY FOLLOW UP NOTE: 2/07/24        I took the history from reviewing the previous notes that she was following with Dr. Amaya.  I have copied and updated from prior notes.    ONCOLOGY History:    1.  January 2006:  Diagnosed with Stage IIB, T2 N1 M0 invasive lobular carcinoma of the right breast.  Final pathology showed a 4.5 x 3.8 x 2.5 cm, 01/14 lymph nodes positive.  Estrogen, progesterone receptor positive, HER-2 negative.     2.  Genetics.  BRCA1 and 2 mutations not detected. 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       THERAPY TO DATE:   1.  2006:  ECOG 2104 protocol of dose-dense Adriamycin, Cytoxan and Avastin x4 cycles followed by Taxol and Avastin x4 cycles.   2.  03/2007:  Completed 1 year Avastin.   3.  11/2006-01/2007:  Radiotherapy to the right breast of 5040 cGy.   4.  03/20072660-5815:  Aromasin.  Stopped after moving to the [facility].   5.  01/2016:  Right modified radical mastectomy with latissimus dorsi flap reconstruction and a left prophylactic mastectomy with latissimus dorsi flap reconstruction.     She recently had MRI of the brain as a follow-up for multiple sclerosis and there were multiple calvarial lesions noted which was suspicious for metastatic disease.  There was no evidence of new multiple sclerosis lesions.  She had a PET scan on 8/30/2019 which showed widespread bone metastatic lesions (calvarium, spine, sacrum, pelvis, ribs most prominent at C7, T3, L1 and L4), hypermetabolic 3 cm lesion in LLL, and mediastinal/hilar LN. CEA wnl at 1.9 and C27-29 elevated to 415 (28 on 8/23/16). A CT guided biopsy of the right iliac bone was obtained on 9/4/19, pathology consistent with metastatic adenocarcinoma (breast), ER/[place] negative, HER-2 negative PDL 1 < 1%. A second biopsy was take of the LLL nodule via EBUS on 9/5/19 did not show any malignancy.    C/T/L spine MRI 8/3/19 to 9/2/19 with numerous enhancing lesions of the C, T and L spine, largest around T9 and L1. No evidence of  cord compression. Has a L1 compression fracture and impending L4.     Received radiation T12-L5 3000 cGy in 10 fractions from 9/6/2019 till 9/18/2019.  Neurosurgery recommended no surgical intervention but to wear Gorge brace when out of bed and HOB >30 degrees    She started palliative Xeloda 2000/1500 on 10/1/2019 but after just a few doses she noticed nausea, red eyes blurred vision, loss of appetite.  She stopped taking it after 5 doses and was seen by nurse practitioner on 10/7/2019 when she was improving.  At that point she was restarted on Xeloda at a lower dose of 1000 mg twice a day.  As she was not able to tolerate even the lower dose with extreme nausea and vomiting and feeling extremely fatigued and blurry vision, we decided on stopping Xeloda.    We decided on repeating scans and MRI brain on 10/25/2019 showed no intracranial metastatic disease.   Multiple enhancing calvarial lesions may be increased in number and are suggestive of metastatic disease. Thinner imaging on the current study may identify the smaller lesions and may be responsible for  identified more lesions.   There is a single focus of T2 hyperintense signal within the anterior right temporal lobe may represent interval demyelination. There is no evidence for active demyelination.    PET/CT on 11/1/2019 showed favorable response to therapy and Overall FDG uptake within scattered osseous metastases is decreased since 8/30/2019, particularly within the pelvis. Increased sclerotic appearance of L1 and L4 pathologic compression deformities, likely sequela of recently completed palliative radiation therapy.    No significant FDG uptake in previously demonstrated hypermetabolic mediastinal lymph nodes. Biopsy negative on 9/5/2019.  There is also decreased FDG uptake in the left lower lobe (biopsy negative for  malignancy), now with dense consolidation containing air bronchograms suggestive of infection versus aspiration.  There is diffuse FDG  uptake within the esophagus, consistent with esophagitis.    Because of intolerance to Xeloda we decided on switching to weekly paclitaxel on 11/5/2019.    C#2 Taxol 12/4/19- started with 70mg/m2 dose reduction    C#3 Taxol 12/30/2019     PET/CT on 1/24/2020 showed progression of the disease in some of the bone lesions including increase in size and lytic lesion within the vertebral body of C4 measuring 1 cm as opposed to 0.6 cm previously and a new central soft tissue nodule with SUV max 4.1 when previously it was non-hypermetabolic.  There is also some progression noted in the right proximal femur and right iliac bone.  There is decreased metabolic uptake in the right lamina of T9 with SUV max 4.7 when previously it was 8.0.  Other lesions are stable.    CA 27-29 also had increased significantly and it was 257 on 1/28/2020.    We decided on switching to eribulin on 1/28/2020.    C#2 2/18/2020  C#2 D#8 2/25/2020    C#3 D#1 3/18/2020 -delayed by 1 week as per patient's preference because she wanted to visit her family.    She had a repeat PET/CT on 3/27/2020 which showed stable size of the bone metastatic lesions although the FDG avidity was less, consistent with treatment response.    She had MRI of the thoracic spine on 4/3/2020 and it was compared to the one which was done in August 2019 and it showed increased size of several metastatic lesions most notably at T9 but also at T5-T7.  Other lesions at T10, T11 and T12 appeared improved.    CA 27-29 was also down to 222 on 3/18/2020.    Cycle #4 eribulin ( dose reduced to 1.2 mg/m2 )-4/7/2020-   Cycle #5 Eribulin ( 1.2 mg/m2 )- 4/28/2020   Cycle #6 Eribulin ( 1.2 mg/m2 )- 5/19/2020     Cycle #7 Eribulin ( 1.2 mg/m2 )- 6/9/2020    Cycle #8 Eribulin ( 1.2 mg/m2 )- 6/30/2020     She had a repeat PET scan on 7/17/2020 which showed incidental finding of new acute bilateral pulmonary embolism in the distal left main pulmonary artery extending in the left upper and lower  lobes and in the segmental and branch arteries in the right lower lobe.    It also showed mildly increased FDG avidity in the lytic lesion in the T9 lamina and right pedicle with SUV max 5.3, previously 3.9.  That is also slightly increased FDG uptake to the left anterior fifth rib at the costochondral junction SUV max 3.9, previously 2.5.  There is slightly decreased FDG uptake in the right femoral neck lesion SUV max 2.2, previously 2.9.  FDG uptake in other bone lesions is fairly stable.  No new metastasis are seen.    CA 27-29 was 308 on 6/30/2020.  It was 286 on 5/19/2020.    Overall this is consistent with only slight progression versus stable disease.    She was started on Lovenox.    Cycle #9 eribulin 7/21/2020. CA 27-29 was 238    C#10 eribulin 8/18/2020. ( delayed by one week for ANC 0.9 )    C#11 Eribulin 9/8/2020    C#12 Eribulin 9/29/2020     C#13 Eribulin 10/20/2020     PET scan on 11/6/2020 shows progression of the disease with increased FDG uptake in the lytic lesions in the T9/T10 and right posterolateral elements as well as increased FDG uptake in the previously known hypermetabolic right iliac bone lesion suggesting recurrence of previously treated metastasis.  There is new subtle lesion in the right manubrium.  There is also a new fracture in the anterolateral left fourth rib with associated uptake and this could be a pathologic fracture.  Healing fracture in the left anterior fifth rib lesion.  There is resolution of the left pulmonary emboli.  Decreased FDG uptake of the esophagus consistent with improving inflammation.    CA 27-29 on 10/20/2020 was also elevated at 489.    C#1 Trodelvy on 11/11/2020.  Cycle #2 Trodelvy 12/2/2020  Cycle number 2-day #8 Trodelvy 12/8/2020.    12/15/2020-12/16/2020.  She was admitted to the hospital with nausea vomiting and dehydration.  She had tachycardia and initial lactic acid of 5.  It decreased to 1.4 after 2 L of normal saline.  She also had hypokalemia  and ANC was 1.3.  She was treated with IV fluids.  Procalcitonin was negative.  CTA of the chest was negative for pulmonary embolism or pneumonia.  No bacterial infection was documented.  Her medication which she was initially unable to tolerate at home, were restarted and she was discharged home once started to feel better.      We delayed the start of cycle number 3 x 1 week and decrease the dose of chemotherapy by 25%.  She also had neutropenia with ANC of 1.0.      She started cycle number 3-day #1 on 12/29/2020.  CA 27-29 was 392.    Cycle number 3-day #8 1/5/2021.    C#4 Trodelvy 1/20/2021- 75% dose    2/5/2021.  PET/CT overall shows a good response to treatment with improvement of previously hypermetabolic lesions in the spine and pelvis and stability of other bone meta stasis.  There is a single lytic lesion in the right iliac bone which demonstrates slight Yimi increased FDG uptake and has SUV max 4.7 while previously it was SUV max 3.4.  There was diffuse wall thickening of the esophagus with uptake in the esophagus in the fundus of the stomach and this could be seen with inflammation.    Trodelvy cycle #5 - 2/10/2021.  75% of the dose.  CA 27-29 was 337.    Trodelvy cycle #6 - 3/3/2021.  75% of the dose.  Trodelvy cycle #6, D#8 - 3/10/2021.  75% of the dose.    Trodelvy C#8, D#8 on 4/21/2021  CA 27-29 stable at 371 on 4/14/2021    Trodelvy, cycle #9- 5/5/2021        On 2/25/2020 when she had biopsy of the right acetabulum and it was consistent with metastatic lobular carcinoma.  Again it was Triple Negative.     On 3/18/2020 when she also met with Dr. Arelis Arshad who also advised testing for androgen receptor studies on the biopsy specimen.  Tumor was diffusely androgen receptor positive.      5/26/2021-cycle #10 Trodelvy     CA 27-29 was 545.    6/11/2021.  PET/CT shows mildly increased uptake in several bone lesions for example in the left anterior iliac crest, mid right iliac crest and left ischium.   Uptake in other bone lesions are similar to previously.    Because of minimal progression of the disease and she is tolerating chemotherapy very well, we decided on continuing the same chemotherapy.    6/16/2021-Trodelvy cycle #11    7/7/2021 -Trodelvy cycle #12    9/14/2021-Trodelvy-cycle #15, day #8.    9/8/2021-CA 27-29 was more elevated and it was 1176.    PET/CT on 9/24/2021 showed stable findings with no evidence of FDG avid metastatic disease within the body.  Left axillary lymph nodes are prominent and hypermetabolic and likely from recent vaccinations in the left deltoid muscle.  Healed bony metastasis seems stable.      Cycle #16, Trodelvy 9/28/2021.  Cycle #17, day #8 Trodelvy was on 10/26/2021.  Cycle #18, day #8 Trodelvy on 11/22/2021       She saw Dr. aJckie jamison on 10/6/2021.  She was not considered eligible for Enfortumab trial.    Cycle #19, Trodelvy on 12/7/2021 12/21/2021.  PET/CT showed progression of bony metastatic disease.  There is increase hypermetabolism in both the right and left iliac bones and the sternum.  Other bone lesions are stable.    There is new scattered areas of groundglass throughout both the lungs and these findings are indeterminate but may represent an infectious etiology.  Interval resolution of left axillary FDG avid lymphadenopathy.    She was started on Casodex 150 mg daily on 1/6/2022.    She was admitted to the hospital from 3/17/2022-3/19/2022 for vomiting and diarrhea and  severe back pain.  I reviewed the discharge summary.  CT lumbar spine showed a stable severe chronic L1 pathologic compression fracture.  Stable mild compression deformity of superior endplate of L3 and superior endplate of L4.  The upper margin of the L4 fracture extends slightly into the spinal canal without high-grade canal stenosis and this is also stable.  Nondisplaced fractures coursing through the L3 pedicles bilaterally were seen which were not clearly seen previously this could be  acute and likely pathologic.  No extraspinal soft tissue abnormality.  Neurosurgery recommended conservative management.  Her pain was controlled and she was discharged home as she felt better with this.      3/31/2022-PET/CT shows progressive bone meta stasis with progressive FDG uptake in the following lesions. Left iliac crest lesion with max SUV of 7.2, previously 3.5. Right mid iliac crest lytic lesion shows maximum SUV of 7.8, previously 6.9.  T10 vertebral body mixed lytic and sclerotic lesion with an SUV of 7.1, previously not hypermetabolic. Thoracic vertebral bodies 8, 7, 5, and 4 also show hypermetabolic lesions were previously there was no hypermetabolism.  Some of the sclerotic osseous lesions are unchanged and do not show increased hypermetabolism compatible treated lesion such as a severe compression deformity at L1 as well as superior compression deformity at L4.  Medial right clavicle sclerotic lesion with SUV max of 4.8, previously not hypermetabolic. There is also right-sided sternal lesions.      4/14/2022--C#1- switch to single agent Doxil 50 mg per metered squared every 4 weeks.    5/13/2022-cycle #2- Doxil- dose reduced to 40 mg per metered square due to severe nausea/vomiting and migraines requiring IV fluids and IV antiemetics.    6/10/2022-cycle #3-Doxil 40 mg per metered square    7/1/2022- PET/CT shows resolution of the hypermetabolic skeletal metastasis.    7/6/2022-cycle #4-Doxil 40 mg per metered square  8/5/2022- cycle #5-Doxil  9/1/2022.  Cycle #6-Doxil  9/30/2022- cycle #7-Doxil    10/18/2022.  PET/CT does not show evidence of any FDG activity    11/2/2022-cycle #8-Doxil.  11/30/2022-cycle #9-Doxil.  12/28/2022-cycle #10-Doxil    2/3/2023- C#11- Doxil (delayed by 1 week because neutrophil count was 0.9)-wanted to give her Onpro but at that time it was not approved by insurance.    3/3/2023.  Cycle #12 Doxil.  Given with Onpro.    Repeat PET/CT on 3/27/2023 overall showed very stable  findings.  PET/CT showed focal FDG avidity in the heart. This is in an expected location of AV node or mitral valve.  This is nonspecific.  We checked an echocardiogram on 4/20/2023 which was unremarkable.  She is asymptomatic.  Continue to monitor clinically.      3/31/2023.  Cycle #13 Doxil.  Given with Onpro.     4/12/2023-she presented to ED with increased migraine headaches and nausea and vomiting.  She was treated symptomatically and was discharged home after she started to feel better.      4/28/2023.  Cycle #14 Doxil.  Given with Onpro    Cycle #16 Doxil with Onpro 6/23/2023    Cycle #17 Doxil with Onpro 7/21/2023      CA 15-3 has been slowly rising.    Repeat PET/CT on 8/8/2023 showed pretty stable findings.    She has been having more pain in the lumbar spine/low back.    8/18/2023.  Cycle #18 Doxil with Onpro    9/15/2023.  Cycle #19 Doxil with Onpro.    10/16/2023.  Cycle #20 Doxil with Onpro.    11/10/2023.  PET/CT overall showed pretty stable findings.    11/15/2023.  Cycle #21 Doxil with Onpro    12/13/2023.  Cycle #22 Doxil with Onpro     1/10/2024.  Cycle #23 Doxil with Onpro     2/7/2024.  Cycle #24 Doxil with Onpro is planned      Interval history.    She tolerated last chemotherapy well.  Pain is under fair control.  Currently taking Belbuca 600 mcg in the morning and 450 mcg in the evening.  She takes morphine as needed.  On average she takes 1-2 morphine in the day.  She has mild occasional nausea but no vomiting.  No diarrhea or constipation.  Migraines are in good control.  Denies new swellings.  No fevers shortness of breath or infections.  Denies any change in mild neuropathy.        ECOG 1    ROS:  Rest of the comprehensive review of the system was negative.        I reviewed other history in epic as below.       PAST MEDICAL HISTORY:     1.  Breast cancer  2.  Multiple sclerosis.   3.  Depression.   4.  Migraines.   5.  Hypertension.   6.  Cholecystectomy and umbilical hernia repair.    "  SOCIAL HISTORY: She smoked for 5 years but quit many years ago.  Drinks alcohol socially.  She lives with her .  She is a teacher in middle school.       FAMILY HISTORY: She mentions that her mother had breast cancer at 49.  Both grandmothers had breast cancer but she is not sure of the age.  A couple of cousins have cancers.  Patient has 3 children who are healthy.    Current Outpatient Medications   Medication    apixaban ANTICOAGULANT (ELIQUIS ANTICOAGULANT) 5 MG tablet    Buprenorphine HCl (BELBUCA) 450 MCG FILM buccal film    Buprenorphine HCl (BELBUCA) 600 MCG FILM buccal film    busPIRone (BUSPAR) 15 MG tablet    calcium citrate-vitamin D (CITRACAL) 315-250 MG-UNIT TABS    Cholecalciferol (VITAMIN D3 PO)    doxepin (SINEQUAN) 10 MG/ML (HIGH CONC) solution    galcanezumab-gnlm (EMGALITY) 120 MG/ML injection    LORazepam (ATIVAN) 0.5 MG tablet    metoclopramide (REGLAN) 5 MG tablet    morphine (MSIR) 15 MG IR tablet    OLANZapine (ZYPREXA) 5 MG tablet    ondansetron (ZOFRAN ODT) 4 MG ODT tab    propranolol ER (INDERAL LA) 80 MG 24 hr capsule    propranolol ER (INDERAL LA) 80 MG 24 hr capsule    ubrogepant (UBRELVY) 100 MG tablet    venlafaxine (EFFEXOR XR) 75 MG 24 hr capsule    vitamin B-2 (RIBOFLAVIN) 25 MG TABS tablet    ZOLMitriptan (ZOMIG) 5 MG nasal spray     No current facility-administered medications for this visit.     Facility-Administered Medications Ordered in Other Visits   Medication    heparin 100 unit/mL injection 5 mL    sodium chloride (PF) 0.9% PF flush 10 mL        Allergies   Allergen Reactions    Bactrim [Sulfamethoxazole W/Trimethoprim]      Internal bleeding    Copaxone [Glatiramer] Hives          PHYSICAL EXAMINATION:     BP (!) 158/99 (BP Location: Left arm, Patient Position: Chair, Cuff Size: Adult Regular)   Pulse 61   Temp 98  F (36.7  C) (Oral)   Ht 1.575 m (5' 2\")   Wt 54 kg (119 lb)   SpO2 98%   BMI 21.77 kg/m    Wt Readings from Last 4 Encounters:   02/07/24 54 " kg (119 lb)   01/10/24 54.2 kg (119 lb 6.4 oz)   01/09/24 54.4 kg (120 lb)   01/03/24 54.4 kg (120 lb)       CONSTITUTIONAL: No apparent distress  EYES: PERRLA, without pallor or jaundice  ENT/MOUTH: Ears unremarkable. No oral lesions  CVS: s1s2 normal  RESPIRATORY: Chest is clear  GI: Abdomen is benign  NEURO: Alert and oriented ×3  INTEGUMENT: no concerning skin rashes.  Scalp cyst is about the same as before.  LYMPHATIC: no palpable lymphadenopathy  MUSCULOSKELETAL: Unremarkable. No bony tenderness.   EXTREMITIES: no pedal edema  PSYCH: Mentation, mood and affect are appropriate        Labs and Imaging.    Reviewed.    2/7/2024.  CBC shows WBC 6.  Hemoglobin 10.9.  Platelets 178.  ANC 4.2.  Chemistry shows calcium 8.6.  Total protein 6.1.      CA 15-3 was 452 on 1/10/2024    CA 15-3 was 408 on 12/13/2023    CA 15-3 was 287 on 11/15/2023    CA 15-3 was 237 on 10/16/2023    CA 15-3 was 242 on 9/15/2023    CA 15-3 was 193 on 8/18/2023 7/21/2023.    CA 15-3 was 186.    7/3/2023    CA 15-3 was 177 on 6/23/2023      CA 15-3 was 129 on 3/31/2023      CA 15-3 was 123 on 3/3/2023.      11/30/2022     CA 15-3 was 135.    9/30/2022  CA 15-3 was 159.  CA 27-29 was 1992 on 6/10/2022  CA 27-29 was 3370 on 5/13/2022.  It was 5307 on 4/14/2022 ferritin Doxil was started.      9/28/2021.      Iron studies, ferritin is 101, iron 51, TIBC 268 and iron saturation index 19%.        PET/CT on 11/10/2023  COMPARISON: PET 8/8/2023        FINDINGS:      HEAD/NECK:  There is no suspicious FDG uptake in the neck.     The paranasal sinuses are clear. The mastoid air cells are clear.      No hypermetabolic lymph nodes are demonstrated in the neck.      The mucosal and deep spaces of the neck are unremarkable. The major  salivary glands are unremarkable. The thyroid is unremarkable. The  major vasculature of the neck are patent.     CHEST:  Bilateral mastectomies with breast implants.  There is no suspicious FDG uptake in the chest.      The central tracheobronchial tree is clear. No pleural effusion or  pneumothorax. No acute consolidation.      No hypermetabolic lymph nodes are demonstrated in the chest.     Heart size is within normal limits. No pericardial effusion. The  thoracic aorta and main pulmonary artery are within normal limits for  diameter. The esophagus is unremarkable.      ABDOMEN AND PELVIS:  There is no suspicious FDG uptake in the abdomen or pelvis.     The liver is unremarkable. Cholecystectomy clips. No intrahepatic or  extrahepatic biliary ductal dilatation. The pancreas is unremarkable.  No pancreatic ductal dilatation. The spleen is unremarkable. The  adrenal glands are unremarkable.     Symmetric enhancement of the kidneys. No hydronephrosis. The urinary  bladder is unremarkable. Uterus is unremarkable. Ovaries are absent.  No hypermetabolic lymph nodes are demonstrated in the abdomen or  pelvis.     Normal caliber of the small and large bowel. No free air, free fluid,  or fluid collection. Normal caliber of the abdominal aorta.     LOWER EXTREMITIES:   There is no suspicious FDG uptake in the visualized lower extremities.     BONES: Again demonstrated are numerous sclerotic and lytic lesions  some of which may remains mildly hypermetabolic. FDG uptake in bone  metastases are not substantially changed, with a few lesions showing  mild FDG increases, others decreased, and others not significantly  changed. Examples include:     Decreased FDG:    T8 vertebral body max SUV 3.1 (prior 4.3).  T9 left posterior lytic lesion max SUV 3.1 (prior 3.4)     Increased FDG:   A left ninth rib costovertebral chondral junction max SUV 3 (prior  2.6.)  Right sixth rib anteriorly max SUV 3.8 prior 2.7.     Not significantly changed:  Right second rib anterolateral focus of hypermetabolism max SUV 3.9,  not significantly changed from prior.   Right iliac bone max SUV 3.6 (prior 3.9).     Multiple old right rib fractures. Left 10th rib the  deformity with  callus. A severe compression deformity of L1 unchanged.                                                                      IMPRESSION: In this patient with history of breast cancer:  Overall  stable disease.  Again demonstrated are numerous metastatic bone lesions, some of which  have mildly elevated FDG activity. Some have increased slightly,  others have decreased slightly,  others are not significantly changed.      CT lumbar spine on 8/28/2023  IMPRESSION:  1.  No evidence for acute displaced fracture. Fractures of the L1, L3 and L4 vertebral bodies appear stable from 07/31/2023.  2.  No evidence of traumatic subluxation.  3.  Diffuse osseous metastatic disease.    MRI of the lumbar spine on 7/31/2023  COMPARISON: PET/CT 03/27/2023.  TECHNIQUE: MRI Lumbar Spine without IV contrast.     FINDINGS: Examination is mildly limited by patient motion artifact.     Alignment is unchanged. Redemonstrated heterogeneous bone marrow signal throughout the visualized lumbosacral spine including numerous focal abnormal T1 hypointense and STIR hyperintense marrow replacing lesions, including confluent lesions at T12 and S2   vertebrae. Similar chronic L1 pathologic burst fracture with mild superior endplate retropulsion and severe anterior-central vertebral body height loss. Likewise, similar chronic L4 superior endplate fracture with associated mild retropulsion. Large   Schmorl's node at the L2 inferior endplate, as before. Vertical defects in the bilateral L3 pedicles are redemonstrated compatible with chronic pathologic fractures. Asymmetric STIR hyperintense signal abnormality in the left sacral ala partially   visualized, which may represent additional confluent pathologic marrow replacement. Otherwise, remaining lumbar vertebral body heights are maintained. Conus signal is normal and terminates at L2. No perivertebral soft tissue edema. There is mild edema   within the bilateral multifidus and erector  spinae muscles, which may indicate posttraumatic injury/strain. Superficial subcutaneous soft tissue edema may be posttraumatic or related to positioning. Prominence of bile duct may be related to   postcholecystectomy physiologic ectasia. Small/subcentimeter right renal cyst. Last fully formed disc is designated L5-S1. Multilevel disc degeneration and height loss, severe at L5-S1.     T12-L1: No disc without bulge or protrusion. Facet joints are normal. Mild L1 superior endplate retropulsion indents the ventral thecal sac without spinal canal stenosis. No foraminal stenosis.     L1-L2: No disc without bulge or protrusion. Facet joints are normal. No spinal canal or foraminal stenosis.     L2-L3: Bilobed left asymmetric diffuse disc bulge. Mild facet arthropathy with ligamentum flavum thickening. Mild left foraminal stenosis. No right foraminal or spinal canal stenosis.     L3-L4: Bilobed left asymmetric diffuse disc bulge. Mild facet arthropathy with ligamentum flavum thickening. In conjunction with superior endplate retropulsion, there is borderline mild central spinal canal stenosis and mild left subarticular recess   narrowing. Mild right and moderate left foraminal stenosis.     L4-L5: Small left foraminal disc protrusion. Moderate facet arthropathy. No spinal canal or foraminal stenosis.     L5-S1: No disc without bulge or protrusion. Facet joints are normal. No spinal canal or foraminal stenosis.                                                                      IMPRESSION:  1.  Extensive osseous metastases as well as chronic pathologic fractures at L1, L3, and L4, as detailed.  2.  Partially visualized asymmetric signal abnormality in the left sacral ala may represent confluent pathologic marrow replacement. Marrow edema related to sacral fracture is difficult to exclude in the appropriate clinical context.  3.  Posterior paraspinous muscular edema can be compatible with posttraumatic injury/strain.  4.   Multilevel degenerative changes, as detailed.        7/6/2023.  MRI brain  IMPRESSION:  1.  No acute ischemia, mass/mass effect, or abnormal intracranial enhancement.   2.  Similar sequela from demyelinating disease.  3.  Osseous metastases, as before.      Echocardiogram 4/20/2023  Left ventricular size, wall motion and function are normal. The ejection  fraction is 60-65%.  Global right ventricular function is normal.  No hemodynamically significant valve abnormalities.  The inferior vena cava is normal.  No pericardial effusion.     This study was compared with the study from 4/7/22. No significant change.      3/27/2023.  PET/CT  1. No new suspicious FDG uptake within the skeleton.  2. Stable numerous non-FDG avid lytic and sclerotic osseous lesions  throughout the axial skeleton.  3. Stable nondisplaced fracture of the left acromion/glenoid, chronic  bilateral healing rib fractures, and compression deformities of the  thoracic and lumbar spine.  4. Hypermetabolic focus in the expected location of the AV node versus  mitral valve, this may represent a normal AV node versus mitral valve  pathology such as vegetations. Recommend follow-up with cardiac  Ultrasound.        11/30/2022-x-ray of the ribs of the left side  There are a few sclerotic lesion seen in the right anterior ribs that were seen on previous PET CTs. These appear to involve the fourth and fifth ribs. There are likely more subtle lesions that were   described on the PET CT scan. Irregularity of the distal end of the left ninth rib may be from metastatic disease or fracture on the oblique view which likely is chronic.       10/18/2022-PET/CT showed no new suspicious FDG uptake within the skeleton.  Stable abnormal FDG avid lytic and sclerotic osseous lesions.  Mild diffuse FDG uptake throughout the large bowel without CT abnormality.    7/1/2022.  PET/CT shows diffuse sclerotic and lytic osseous lesions without any abnormal FDG uptake in the  skeleton, consistent with treated disease.  There is evaluation of previous hypermetabolic bone lesions.  Multiple chronic rib fracture deformities and stable compression fracture deformities of T6, L1 and L4.  No suspicious FDG uptake in the chest abdomen or pelvis.    3/31/2022-PET/CT shows progressive bone metastasis with progressive FDG uptake in the following lesions. Left iliac crest lesion with max SUV of 7.2, previously 3.5. Right mid iliac crest lytic lesion shows maximum SUV of 7.8, previously 6.9.  T10 vertebral body mixed lytic and sclerotic lesion with an SUV of 7.1, previously not hypermetabolic. Thoracic vertebral bodies 8, 7, 5, and 4 also show hypermetabolic lesions were previously there was no hypermetabolism.  Some of the sclerotic osseous lesions are unchanged and do not show increased hypermetabolism compatible treated lesion such as a severe compression deformity at L1 as well as superior compression deformity at L4.  Medial right clavicle sclerotic lesion with SUV max of 4.8, previously not hypermetabolic. There is also right-sided sternal lesions.      Biopsy from the right acetabulum shows metastatic lobular carcinoma.  It is triple negative.  Androgen receptor was diffusely positive (90%, moderate intensity) by immunohistochemistry. )  PD-L1 negative.    Foundation one showed CDH1 mutation (initially lobular cancer), CCND1 amplification ( equivocal ). FGF3, FGF4, FGF19 amplification ( Equivocal ). Most promising is CCND1 amplification with abemaciclib showing response in 4/10 patients. FGF3,FGF4 and FGF19 are targetable with pan-FGFR inhibitors.     ASSESSMENT AND PLAN:     Metastatic breast cancer negative breast cancer (androgen receptor positive ) with extensive involvement of the bones.  There is also a left lower lobe hypermetabolic lesion and mediastinal lymph nodes which are hypermetabolic.  The biopsy from the right iliac bone is consistent with metastatic lobular breast cancer but  it is hormone receptor and HER-2 negative and PDL 1 is also negative.    Foundation 1 testing did not reveal any actionable mutations.    She was intolerant to Xeloda and progressed on Taxol and eribulin.      We then switched to recently FDA approved sacituzumab govitecan-hziy (Trodelvy) for TNBC, based on the results of the phase II IMMU-132-01 clinical trial.   She started this on 11/11/2020.      She obtained a second opinion from Dr. Castillo from [facility] who recommended a repeat biopsy to be done to make sure that she really has triple negative breast cancer.      She also met with Dr. Arelis Arshad on 3/18/2021.      After 10 cycles of Trodelvy, she had PET/CT on 6/11/2021 which showed mildly increased uptake in some of the bone lesions while stability in other bone lesions.        After 15 cycles, repeat PET scan showed stable findings.  CA 27-29 has been increasing and it is 1176.      As she was tolerating the chemotherapy well and her quality of life has been decent and scans were stable although she had a rising CA 27-29, we decided on continuing the same chemotherapy because it has been a very slow progression of the disease.    Then she had clear progression of the disease in the bones with increased FDG uptake in both right and left iliac bones and the sternum.    Foundation one showed CDH1 mutation (initially lobular cancer), CCND1 amplification ( equivocal ). FGF3, FGF4, FGF19 amplification ( Equivocal ). Most promising is CCND1 amplification with abemaciclib showing response in 4/10 patients. FGF3,FGF4 and FGF19 are targetable with pan-FGFR inhibitor    As the tumor is diffusely positive for androgen receptor, we decided to start her on androgen receptor blockers.    I initially recommended enzalutamide 160 mg daily ( Enzalutamide for the Treatment of Androgen Receptor-Expressing Triple-Negative Breast Cancer----J Clin Oncol. 2018 Mar 20; 36(9): 884-890. ).    Eventually we decided to  start her on Casodex 150 mg daily instead of Enzalutamide due to cost issues.  Clinical Trial Clin Cancer Res. 2013 Oct 1;1919):5502-12.   Phase II trial of bicalutamide in patients with androgen receptor-positive, estrogen receptor-negative metastatic Breast Cancer  She started Casodex on 1/6/2022.    She had progressive disease in the bones on the PET scan on 3/31/2022.    We changed to single agent Doxil on 4/14/2022.      She developed significant nausea vomiting and headache so cycle #2 was given on 5/13/2022 with 20% dose reduction which she tolerated well.    Cycle #3 was given on 6/10/2022 with the same dose reduced Doxil.    Repeat PET/CT on 7/1/2022 shows resolution of the FDG avid bony metastasis consistent with treated disease.  No suspicious FDG uptake was seen in the chest abdomen and pelvis.  CA 27-29 was also coming down.    CA 15-3 has been slowly rising.    Repeat PET/CT on 8/8/2023 showed pretty stable findings.    She has been having more pain in the lumbar spine/low back.    Otherwise tolerating chemotherapy well.  As the progression has been very slow and mild and overall she has been tolerating chemotherapy well, we decided to continue the same chemotherapy.    Over time CA 15-3 has continued to slowly increase but the PET scan on 11/10/2023 overall showed very stable findings.    We decided to continue Doxil for now.    Overall chemotherapy is going well.  She will proceed with cycle #24 Doxil today.      We will repeat PET/CT before next chemotherapy.          In the future we can potentially consider another chemotherapy like carboplatin or gemcitabine.    Chemotherapy-induced neutropenia.    Continue Onpro support.    Anemia.  She has mild anemia from chemotherapy.  Continue to observe.      Bone disease.  She has metastatic disease to the bone.  Previously she got palliative radiation to T12-L5 3000 cGy in 10 fractions from 9/6/2019 till 9/18/2019.  She was evaluated by orthopedics   Bipin Elliott on 11/1/2019 and conservative management was recommended.    She was on Zometa every 3 months. She last received on 9/29/2020.  Because of progression of the disease in the bones, we switched to Xgeva which she received on 11/11/2020.    She had progression of the disease in the bones so we switched to Doxil but we continued xgeva.  PET scan on 7/1/2022 does not show any suspicious FDG uptake in the bones consistent with treated disease.  Repeat PET/CT in March 2023 was without any active FDG avid lesions.  Overall PET scan from 11/10/2023 remains pretty stable with slight decrease in FDG avidity of some lesions was a slight increase in some but overall showing pretty stable findings.  Continue calcium and vitamin D and continue monthly Xgeva.  She will get Xgeva today.      Cancer related pain.  She was also seen by Dr. Yusuf from pain clinic and she got bilateral SI joint steroid injections on 10/13/2023 which did not help much.  She recently saw Dr. Whitaker again who increased buprenorphine buccal film to 600 mcg in the morning and 450 mcg in the evening.  She takes morphine as needed.  Overall with this regimen pain is under fair control.      Nausea.  She has mild occasional nausea.  Take Zofran as needed.     Migraines.  Doing much better with Emgality.  She takes as needed Ubrelvy which helps.  She is following with neurology.      Bilateral pulmonary emboli.  Continue Eliquis.      Elevated blood pressure.    Her blood pressure in the clinic was elevated.    She is asymptomatic.  I recommend that she checks her blood pressures at home regularly when she is relaxed and keep a log of it.  If it is persistently high then she should talk to her primary care physician for better blood pressure management.  I am not starting any antihypertensive today.        We did not address the following today.      Shingles.  The rash has dried out.  She completed well with acyclovir and prednisone.  She  takes gabapentin for postherpetic neuralgia and is doing better.      Neuropathy.  This remains mild and stable with neuropathy in her hands.    This is in addition to the chronic balance issues and heat and cold sensitivity from underlying multiple sclerosis which is also stable for years.  Continue to monitor.     Subcutaneous nodule in the scalp.  This looks like an epidermoid cyst.  She thinks it is a stable.  Continue to monitor.       Insomnia.  Continue trazodone.      Wall thickening of esophagus and FDG uptake of fundus of the stomach.  It could be in the setting of inflammation from recent nausea and vomiting.  Symptoms have resolved.  Continue to monitor clinically.    Vision changes.    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  It was thought that this could be due to Taxol as patient reported to the ophthalmologist that she was on Taxol in September 2019 when her symptoms started.  But she was not on Taxol in September 2019 when she started noticing the visual changes so Taxol is not responsible for this.  The first dose of Taxol was on 11/5/2019.   She had left cataract extraction on 2/8/2021 and she has noticed significant improvement in her vision.  Thinks that her vision is back to her usual.      Increased FDG ability in the colon.  She had FDG uptake in the cecum and ascending colon but no corresponding lesion was seen on the CT scan.  She is completely asymptomatic so at this time we will continue to observe.    Discussion regarding healthcare directives.  On previous visit she told me that she will bring the health care directive for our records but she forgot so I told her to bring it on next visit.      Breast implant removal.  She tells me that she was planning to do breast implant removal because there was a recall on this.  Her surgery was scheduled for 9/24/2019.  Because of this new development of metastatic breast cancer and her recent radiation,  surgery has been canceled.  We discussed that at this time treatment for metastatic breast cancer would take precedence over the other surgery as the other surgery would require her to be off chemotherapy for several weeks and in that case the chances of cancer progression would be high and I would not recommend that.    Multiple sclerosis.  She will continue to follow with her neurologist Dr. Quiles.  Currently multiple sclerosis is under control and currently she is not taking any medications.    I will see her back in 4 weeks    All of her questions were answered to her satisfaction.  She is agreeable and comfortable with the plan.    Dewayne Zepeda MD

## 2024-02-07 NOTE — NURSING NOTE
"Oncology Rooming Note    February 7, 2024 10:55 AM   Shaneka Alvarez is a 63 year old female who presents for:    Chief Complaint   Patient presents with    Oncology Clinic Visit     Follow up     Initial Vitals: BP (!) 158/99 (BP Location: Left arm, Patient Position: Chair, Cuff Size: Adult Regular)   Pulse 61   Temp 98  F (36.7  C) (Oral)   Ht 1.575 m (5' 2\")   Wt 54 kg (119 lb)   SpO2 98%   BMI 21.77 kg/m   Estimated body mass index is 21.77 kg/m  as calculated from the following:    Height as of this encounter: 1.575 m (5' 2\").    Weight as of this encounter: 54 kg (119 lb). Body surface area is 1.54 meters squared.  Moderate Pain (5) Comment: Data Unavailable   No LMP recorded. Patient is postmenopausal.  Allergies reviewed: Yes  Medications reviewed: Yes    Medications: Medication refills not needed today.  Pharmacy name entered into [facility]:    [facility] - [place], [place] - [address] [place] [address] [facility] - [place], [place] - [address] [email or web address] [email or web address] - [address]  [facility] [place] [address] [facility]    Frailty Screening:   Is the patient here for a new oncology consult visit in cancer care? 2. No      Clinical concerns: NO       Eliza Thakur CMA              "

## 2024-02-13 NOTE — TELEPHONE ENCOUNTER
Received [employer] message from patient requesting refill of Belbuca.     Last refill: Belbuca 450 mcg 1/11/2024  Last refill Belbuca 600 mcg 1/7/2024  Last office visit: 1/3/2024  Scheduled for follow up 3/6/2024    Will route request to MD for review.     Reviewed [place]  Report.

## 2024-03-01 NOTE — TELEPHONE ENCOUNTER
"Prior Authorization Retail Medication Request - RENEWAL     Medication/Dose: Ubrelvy 100 mg  ICD code (if different than what is on RX):  Chronic migraine  Previously Tried and Failed:   DHE and toradol about 2-3 times per week and partially effective  Topiramate 200 mg BID and no side effects but word findings difficulties   for a while and was not effective and made patient \"forgetful\"  Protriptyline (Vivactil) outside provider and was not effective  Sumatriptan nasal and injections and all of the triptans (Maxalt, Relpax) were not effective  Botox injections    Was considered for Tysabri and 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  Venlafaxine helpful for anxiety/depression but not headaches  Naproxen as needed   Propranolol 60 mg ER -unsure of the effectiveness  Buspar for anxiety and headaches  Gabapentin-caused \"stomach sickness\"  Chlorpromazine -for nausea and has been helpful  Tizanidine -\"does not remember\"  bystolic  sumavel  Amitriptyline   Depakote  Verapamil  Petadolax 75 mg twice daily and was not effective  Physical therapy in the past and unsure of the effectiveness  Have not tried Cefaly  Aimovig  for a few month and stopped 3 years ago     Rationale:  Chronic migraine     Insurance Name:  [employer]  Insurance ID:  [id number]         Pharmacy Information (if different than what is on RX)  Name:    Phone:  "

## 2024-03-01 NOTE — NURSING NOTE
Is the patient currently in the state of [place]? YES    Visit mode:VIDEO    If the visit is dropped, the patient can be reconnected by: VIDEO VISIT: Text to cell phone:   Telephone Information:   Mobile [phone]       Will anyone else be joining the visit? NO  (If patient encounters technical issues they should call [phone]    How would you like to obtain your AVS? MyChart    Are changes needed to the allergy or medication list? Pt stated no changes to allergies and Pt stated no med changes    Reason for visit: ANA MILLERF

## 2024-03-01 NOTE — LETTER
3/1/2024       RE: Shaneka Alvarez  [id number] [facility] [address]       Dear Colleague,    Thank you for referring your patient, Shaneka Alvarez, to the [facility] [place] [facility]. Please see a copy of my visit note below.    Shaneka is a 63 year old who is being evaluated via a billable video visit.        Subjective  Shaneka is a 63 year old, presenting for the following health issues: headache   RECHECK  Last visit in Sep 2023, see note for details  Patient is in the car-passenger   Reports that she has been Doing great. No new headache symptoms reported today.  Emgality seems to work very well and no side effects  Ubrelvy for break thru -not as many break thrus. Refilled Ubrelvy last time in September and has 3 tables left.   No side effects.   Propranolol 80 mg one am and two tabs at night. No side effects.   Metoclopromide as needed for migraine. Would like to have on hand.   Zolmitriptan the best working migraine for rescue. Every pharmacy do not have it. May be available again.     Plan:  Emgality and propranolol for migraine prevention  Rescue Ubrelvy or Zolmitriptan as needed. Limit use to no more than 9 days per month   Metoclopramide a needed for migraines   Follow up in a year if stable or sooner if needed     DATA  Oncology note reviewed -ongoing observation, pain controlled with belbuca, feeling well, no changes and follow up with oncology in 3 month    Past Medical History:   Diagnosis Date    Breast cancer (H)     Age 44    Cataract     left eye    Chemotherapy induced nausea and vomiting 12/15/2020    Common migraine     Esophageal reflux     H/O bilateral mastectomy     Mild major depression (H24)     Multiple sclerosis (H)     Pulmonary embolism (H)          Objective   Vitals - Patient Reported  Pain Score: No Pain (0)      5/20/2023     2:17 PM 8/30/2023    11:53 AM 2/28/2024     7:37 PM   HIT-6   When you  have headaches, how often is the pain severe 13 11 11   How often do headaches limit your ability to do usual daily activities including household work, work, school, or social activities? 11 10 8   When you have a headache, how often do you wish you could lie down? 11 11 11   In the past 4 weeks, how often have you felt too tired to do work or daily activities because of your headaches 10 8 6   In the past 4 weeks, how often have you felt fed up or irritated because of your headaches 13 10 6   In the past 4 weeks, how often did headaches limit your ability to concentrate on work or daily activities 11 8 8   HIT-6 Total Score 69 58 50           5/20/2023     2:21 PM 8/30/2023    11:55 AM 2/28/2024     7:39 PM   MIDAS - in the past three months:   On how many days did you miss work or school because of your headaches? 45 4 0   How many days was your productivity at work or school reduced by half or more because of your headaches? 20 25 0   On how many days did you not do household work because of your headaches? 60 25 0   How many days was your productivity in household work reduced by half or more because of your headaches? 30 5 1   On how many days did you miss family, social, or leisure activities because of your headaches? 40 10 1   On how many days did you have a headache? 70 30 3   On a scale of 0-10, on average how painful were these headaches? 8 7 8   MIDAS Score 195 (IV - Severe Disability) 69 (IV - Severe Disability) 2 (I - Little or No Disability)       Physical Exam   Patient is alert and no in apparent acute distress,  mentation appears normal, judgement and insight intact, normal speech.    I discussed all my recommendations with Shaneka Alvarez who verbalizes understanding and comfortable with the plan.      12 minutes spent on the date of the encounter doing video access, chart  review, meds review, treatment plan, documentation and further activities as noted above          Again, thank you for  allowing me to participate in the care of your patient.      Sincerely,    GUTIERREZ Dunn CNP

## 2024-03-01 NOTE — TELEPHONE ENCOUNTER
Prior Authorization Retail Medication Request    Medication/Dose: ZOLMitriptan (ZOMIG) 5 MG nasal spray   Diagnosis and ICD code (if different than what is on RX):  Intractable migraine with aura with status migrainosus [G43.111]   New/renewal/insurance change PA/secondary ins. PA:  Previously Tried and Failed:    Tylenol  DHE injection  DHE nasal spray  Ibuprofen  Toradol   Reglan  Zofran  Ubrelvy - currently on    Rationale:  migraine, best migraine medication/effective    Insurance   Primary: [facility]  Insurance ID:  [id number]    Pharmacy Information (if different than what is on RX)  Name:    Phone:    Fax:

## 2024-03-01 NOTE — TELEPHONE ENCOUNTER
"Prior Authorization Retail Medication Request - RENEWAL     Medication/Dose: Emgality 120mg  ICD code (if different than what is on RX):  Chronic migraine  Previously Tried and Failed:   DHE and toradol about 2-3 times per week and partially effective  Topiramate 200 mg BID and no side effects but word findings difficulties   for a while and was not effective and made patient \"forgetful\"  Protriptyline (Vivactil) outside provider and was not effective  Sumatriptan nasal and injections and all of the triptans (Maxalt, Relpax) were not effective  Botox injections    Was considered for Tysabri and 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  Venlafaxine helpful for anxiety/depression but not headaches  Naproxen as needed   Propranolol 60 mg ER -unsure of the effectiveness  Buspar for anxiety and headaches  Gabapentin-caused \"stomach sickness\"  Chlorpromazine -for nausea and has been helpful  Tizanidine -\"does not remember\"  bystolic  sumavel  Amitriptyline   Depakote  Verapamil  Petadolax 75 mg twice daily and was not effective  Physical therapy in the past and unsure of the effectiveness  Have not tried Cefaly  Aimovig  for a few month and stopped 3 years ago     Rationale:  Chronic migraine     Insurance Name:  [employer]  Insurance ID:  [id number]         Pharmacy Information (if different than what is on RX)  Name:    Phone:  "

## 2024-03-01 NOTE — PATIENT INSTRUCTIONS
No changes in treatment plan  Follow up in a year if stable or sooner if needed if headaches changing

## 2024-03-01 NOTE — PROGRESS NOTES
Shaneka is a 63 year old who is being evaluated via a billable video visit.        Subjective   Shaneka is a 63 year old, presenting for the following health issues: headache   RECHECK  Last visit in Sep 2023, see note for details  Patient is in the car-passenger   Reports that she has been Doing great. No new headache symptoms reported today.  Emgality seems to work very well and no side effects  Ubrelvy for break thru -not as many break thrus. Refilled Ubrelvy last time in September and has 3 tables left.   No side effects.   Propranolol 80 mg one am and two tabs at night. No side effects.   Metoclopromide as needed for migraine. Would like to have on hand.   Zolmitriptan the best working migraine for rescue. Every pharmacy do not have it. May be available again.     Plan:  Emgality and propranolol for migraine prevention  Rescue Ubrelvy or Zolmitriptan as needed. Limit use to no more than 9 days per month   Metoclopramide a needed for migraines   Follow up in a year if stable or sooner if needed     DATA  Oncology note reviewed -ongoing observation, pain controlled with belbuca, feeling well, no changes and follow up with oncology in 3 month    Past Medical History:   Diagnosis Date    Breast cancer (H)     Age 44    Cataract     left eye    Chemotherapy induced nausea and vomiting 12/15/2020    Common migraine     Esophageal reflux     H/O bilateral mastectomy     Mild major depression (H24)     Multiple sclerosis (H)     Pulmonary embolism (H)          Objective    Vitals - Patient Reported  Pain Score: No Pain (0)      5/20/2023     2:17 PM 8/30/2023    11:53 AM 2/28/2024     7:37 PM   HIT-6   When you have headaches, how often is the pain severe 13 11 11   How often do headaches limit your ability to do usual daily activities including household work, work, school, or social activities? 11 10 8   When you have a headache, how often do you wish you could lie down? 11 11 11   In the past 4 weeks, how often have you  felt too tired to do work or daily activities because of your headaches 10 8 6   In the past 4 weeks, how often have you felt fed up or irritated because of your headaches 13 10 6   In the past 4 weeks, how often did headaches limit your ability to concentrate on work or daily activities 11 8 8   HIT-6 Total Score 69 58 50           5/20/2023     2:21 PM 8/30/2023    11:55 AM 2/28/2024     7:39 PM   MIDAS - in the past three months:   On how many days did you miss work or school because of your headaches? 45 4 0   How many days was your productivity at work or school reduced by half or more because of your headaches? 20 25 0   On how many days did you not do household work because of your headaches? 60 25 0   How many days was your productivity in household work reduced by half or more because of your headaches? 30 5 1   On how many days did you miss family, social, or leisure activities because of your headaches? 40 10 1   On how many days did you have a headache? 70 30 3   On a scale of 0-10, on average how painful were these headaches? 8 7 8   MIDAS Score 195 (IV - Severe Disability) 69 (IV - Severe Disability) 2 (I - Little or No Disability)       Physical Exam   Patient is alert and no in apparent acute distress,  mentation appears normal, judgement and insight intact, normal speech.    I discussed all my recommendations with Shaneka Alvarez who verbalizes understanding and comfortable with the plan.      12 minutes spent on the date of the encounter doing video access, chart  review, meds review, treatment plan, documentation and further activities as noted above    GUTIERREZ Larsen, CNP [facility]  Headache certified  [facility]    Video-Visit Details    Type of service:  Video Visit   Originating Location (pt. Location): Home  Distant Location (provider location):  Off-site  Platform used for Video Visit: [employer]

## 2024-03-05 NOTE — PROGRESS NOTES
Virtual Visit Details    Type of service:  Video Visit     Originating Location (pt. Location): Home    Distant Location (provider location):  On-site  Platform used for Video Visit: [facility]    Palliative Care Outpatient Clinic Progress Note    Patient Name: Shaneka Alvarez is being evaluated via a billable video visit.    Primary Provider:  [facility]  Primary Oncologist: Dr Zepeda  Last seen by palliative care: myself, 1/3/24      Chief Complaint: feeling well, no complaints    Background/Summary  Medical:  - breast cancer ER+/IA+, Her2 neg diagnosed in 2006. 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 Didn't tolerate Xeloda, paclitaxel Nov 2019-Jan 2020, treated with eribulin and zometa. PD seen on PET Nov 2020 as well as a new L 4th rib concerning for a pathologic fracture. Started Trodelvy 11/11/20, PD Dec 21, switched to casodex.               - PD again on PET/CT March 2022 including b/l iliac crest lesions, R clavicle, and multifocal T spine involvement. Switched to single agent Doxil April 2022, dose decrease to 80% at 2nd cycle in light of side effects. Tolerated that well. Complete response on scan Oct 2022, apparent slow progression based on rising . Ongoing observation on single-agent Doxil, PET Nov 23 stable  - b/l PE found on PET July 2020, on anticoagulation  - MS with mild right-sided weakness    - long-standing history of migraines, follows with neurology. On monthly aimovig with ubrelvy prn  - SI joint pain, likely largely degenerative, but patient also has metastatic disease in that area. Recently completed appointments with PT, still doing exercises at home, injection by pain clinic Oct 23 without relief. Some relief with  cannabis products, NSAIDs not effective  - post-zoster neuralgia (ED Visit 2022), completely resolved         Social  She lives with her    6 adult children, 1 lives locally  Middle-, currently not working. She misses it, but also realizes that many days she wouldn't be able to go to work.      Psychospiritual  Has been through traumatic experiences in the past. Addressed these with counselors prior to her diagnosis. Doing quite well now.      Care Planning   Not discussed today. She has had a good understanding of her illness and prognosis in past conversations     POLST completed 20: DNR/selective treatments     Opioid Safety   Expected prognosis: > 1 year  Risk: Low (ORT 0)  Indication for Opioid Use: Moderate or Strong  Naloxone Script provided if patient also on benzodiazepine: yes  Indications for Tapering reviewed: rarely uses morphine      : reviewed, ok    Interim History:  At the last visit we discussed pain (chronic MSK, increased belbuca), nausea,( no changes)    Patient is on the call by herself  History gathered today from: patient, medical chart    She is feeling well, her pain is better controlled with the higher am dose of belbuca.     No other concerns or issues  Impression & Recommendations & Counseliny/o woman with metastatic breast cancer on doxil    No changes today    Pain: SI joint pain. She engaged with multimodal pain treatment plan with very limited relief. Current dose of belbuca is providing good relief.     Return to clinic in 3 months    Data / Chart Review:    Review of Systems:   ROS: 10 point ROS neg other than the symptoms noted above in the HPI and pertinents here.         Physical Exam:   Physical Exam:  There were no vitals taken for this visit.     CONSTIT: awake, appears comfortable  EENT: MMM, EOMI, no icterus  RESP: reg, nl effort, no cough  SKIN: no rash, no obvious lesions  NEURO: alert, oriented x3  PSYCH: appropriate affect, memory  and thought process intact    Current pertinent medications:  Belbuca 600mcg qAM 450mg qPM  MSIR 15-30mg q3h prn               Naloxone prescribed  acetaminophen 1000mg tid     Dexamethasone 4mg q12h     Venlafaxine 225mg daily  Lorazepam 0.5-1mg q6h prn   Trazodone 50mg HS  Buspirone 15mg bid     Olanzapine prn nausea      No pertinent allergies      Lab and imaging data reviewed:  Comments:         Alva Whitaker MD  Palliative Medicine

## 2024-03-05 NOTE — LETTER
3/5/2024         RE: Shaneka Alvarez  [id number] [facility] [id number]        Dear Colleague,    Thank you for referring your patient, Shaneka Alvarez, to the [facility]. Please see a copy of my visit note below.    Virtual Visit Details    Type of service:  Video Visit     Originating Location (pt. Location): Home    Distant Location (provider location):  Off-site  Platform used for Video Visit: Bujbu    Video start  time. 11:00 AM  Video stop time. 11:11 AM         ONCOLOGY FOLLOW UP NOTE: 3/05/24        I took the history from reviewing the previous notes that she was following with Dr. Amaya.  I have copied and updated from prior notes.    ONCOLOGY History:    1.  January 2006:  Diagnosed with Stage IIB, T2 N1 M0 invasive lobular carcinoma of the right breast.  Final pathology showed a 4.5 x 3.8 x 2.5 cm, 01/14 lymph nodes positive.  Estrogen, progesterone receptor positive, HER-2 negative.     2.  Genetics.  BRCA1 and 2 mutations not detected. 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       THERAPY TO DATE:   1.  2006:  ECOG 2104 protocol of dose-dense Adriamycin, Cytoxan and Avastin x4 cycles followed by Taxol and Avastin x4 cycles.   2.  03/2007:  Completed 1 year Avastin.   3.  11/2006-01/2007:  Radiotherapy to the right breast of 5040 cGy.   4.  03/[id number]:  Aromasin.  Stopped after moving to the [facility].   5.  01/2016:  Right modified radical mastectomy with latissimus dorsi flap reconstruction and a left prophylactic mastectomy with latissimus dorsi flap reconstruction.     She recently had MRI of the brain as a follow-up for multiple sclerosis and there were multiple calvarial lesions noted which was suspicious for metastatic disease.  There was no evidence of new multiple sclerosis lesions.  She had a PET scan on 8/30/2019 which showed widespread bone metastatic lesions (calvarium, spine, sacrum, pelvis, ribs most prominent at C7, T3, L1 and  L4), hypermetabolic 3 cm lesion in LLL, and mediastinal/hilar LN. CEA wnl at 1.9 and C27-29 elevated to 415 (28 on 8/23/16). A CT guided biopsy of the right iliac bone was obtained on 9/4/19, pathology consistent with metastatic adenocarcinoma (breast), ER/GA negative, HER-2 negative PDL 1 < 1%. A second biopsy was take of the LLL nodule via EBUS on 9/5/19 did not show any malignancy.    C/T/L spine MRI 8/3/19 to 9/2/19 with numerous enhancing lesions of the C, T and L spine, largest around T9 and L1. No evidence of cord compression. Has a L1 compression fracture and impending L4.     Received radiation T12-L5 3000 cGy in 10 fractions from 9/6/2019 till 9/18/2019.  Neurosurgery recommended no surgical intervention but to wear [place] brace when out of bed and HOB >30 degrees    She started palliative Xeloda 2000/1500 on 10/1/2019 but after just a few doses she noticed nausea, red eyes blurred vision, loss of appetite.  She stopped taking it after 5 doses and was seen by nurse practitioner on 10/7/2019 when she was improving.  At that point she was restarted on Xeloda at a lower dose of 1000 mg twice a day.  As she was not able to tolerate even the lower dose with extreme nausea and vomiting and feeling extremely fatigued and blurry vision, we decided on stopping Xeloda.    We decided on repeating scans and MRI brain on 10/25/2019 showed no intracranial metastatic disease.   Multiple enhancing calvarial lesions may be increased in number and are suggestive of metastatic disease. Thinner imaging on the current study may identify the smaller lesions and may be responsible for  identified more lesions.   There is a single focus of T2 hyperintense signal within the anterior right temporal lobe may represent interval demyelination. There is no evidence for active demyelination.    PET/CT on 11/1/2019 showed favorable response to therapy and Overall FDG uptake within scattered osseous metastases is decreased since  8/30/2019, particularly within the pelvis. Increased sclerotic appearance of L1 and L4 pathologic compression deformities, likely sequela of recently completed palliative radiation therapy.    No significant FDG uptake in previously demonstrated hypermetabolic mediastinal lymph nodes. Biopsy negative on 9/5/2019.  There is also decreased FDG uptake in the left lower lobe (biopsy negative for  malignancy), now with dense consolidation containing air bronchograms suggestive of infection versus aspiration.  There is diffuse FDG uptake within the esophagus, consistent with esophagitis.    Because of intolerance to Xeloda we decided on switching to weekly paclitaxel on 11/5/2019.    C#2 Taxol 12/4/19- started with 70mg/m2 dose reduction    C#3 Taxol 12/30/2019     PET/CT on 1/24/2020 showed progression of the disease in some of the bone lesions including increase in size and lytic lesion within the vertebral body of C4 measuring 1 cm as opposed to 0.6 cm previously and a new central soft tissue nodule with SUV max 4.1 when previously it was non-hypermetabolic.  There is also some progression noted in the right proximal femur and right iliac bone.  There is decreased metabolic uptake in the right lamina of T9 with SUV max 4.7 when previously it was 8.0.  Other lesions are stable.    CA 27-29 also had increased significantly and it was 257 on 1/28/2020.    We decided on switching to eribulin on 1/28/2020.    C#2 2/18/2020  C#2 D#8 2/25/2020    C#3 D#1 3/18/2020 -delayed by 1 week as per patient's preference because she wanted to visit her family.    She had a repeat PET/CT on 3/27/2020 which showed stable size of the bone metastatic lesions although the FDG avidity was less, consistent with treatment response.    She had MRI of the thoracic spine on 4/3/2020 and it was compared to the one which was done in August 2019 and it showed increased size of several metastatic lesions most notably at T9 but also at T5-T7.  Other  lesions at T10, T11 and T12 appeared improved.    CA 27-29 was also down to 222 on 3/18/2020.    Cycle #4 eribulin ( dose reduced to 1.2 mg/m2 )-4/7/2020-   Cycle #5 Eribulin ( 1.2 mg/m2 )- 4/28/2020   Cycle #6 Eribulin ( 1.2 mg/m2 )- 5/19/2020     Cycle #7 Eribulin ( 1.2 mg/m2 )- 6/9/2020    Cycle #8 Eribulin ( 1.2 mg/m2 )- 6/30/2020     She had a repeat PET scan on 7/17/2020 which showed incidental finding of new acute bilateral pulmonary embolism in the distal left main pulmonary artery extending in the left upper and lower lobes and in the segmental and branch arteries in the right lower lobe.    It also showed mildly increased FDG avidity in the lytic lesion in the T9 lamina and right pedicle with SUV max 5.3, previously 3.9.  That is also slightly increased FDG uptake to the left anterior fifth rib at the costochondral junction SUV max 3.9, previously 2.5.  There is slightly decreased FDG uptake in the right femoral neck lesion SUV max 2.2, previously 2.9.  FDG uptake in other bone lesions is fairly stable.  No new metastasis are seen.    CA 27-29 was 308 on 6/30/2020.  It was 286 on 5/19/2020.    Overall this is consistent with only slight progression versus stable disease.    She was started on Lovenox.    Cycle #9 eribulin 7/21/2020. CA 27-29 was 238    C#10 eribulin 8/18/2020. ( delayed by one week for ANC 0.9 )    C#11 Eribulin 9/8/2020    C#12 Eribulin 9/29/2020     C#13 Eribulin 10/20/2020     PET scan on 11/6/2020 shows progression of the disease with increased FDG uptake in the lytic lesions in the T9/T10 and right posterolateral elements as well as increased FDG uptake in the previously known hypermetabolic right iliac bone lesion suggesting recurrence of previously treated metastasis.  There is new subtle lesion in the right manubrium.  There is also a new fracture in the anterolateral left fourth rib with associated uptake and this could be a pathologic fracture.  Healing fracture in the left  anterior fifth rib lesion.  There is resolution of the left pulmonary emboli.  Decreased FDG uptake of the esophagus consistent with improving inflammation.    CA 27-29 on 10/20/2020 was also elevated at 489.    C#1 Trodelvy on 11/11/2020.  Cycle #2 Trodelvy 12/2/2020  Cycle number 2-day #8 Trodelvy 12/8/2020.    12/15/2020-12/16/2020.  She was admitted to the hospital with nausea vomiting and dehydration.  She had tachycardia and initial lactic acid of 5.  It decreased to 1.4 after 2 L of normal saline.  She also had hypokalemia and ANC was 1.3.  She was treated with IV fluids.  Procalcitonin was negative.  CTA of the chest was negative for pulmonary embolism or pneumonia.  No bacterial infection was documented.  Her medication which she was initially unable to tolerate at home, were restarted and she was discharged home once started to feel better.      We delayed the start of cycle number 3 x 1 week and decrease the dose of chemotherapy by 25%.  She also had neutropenia with ANC of 1.0.      She started cycle number 3-day #1 on 12/29/2020.  CA 27-29 was 392.    Cycle number 3-day #8 1/5/2021.    C#4 Trodelvy 1/20/2021- 75% dose    2/5/2021.  PET/CT overall shows a good response to treatment with improvement of previously hypermetabolic lesions in the spine and pelvis and stability of other bone meta stasis.  There is a single lytic lesion in the right iliac bone which demonstrates slight Yimi increased FDG uptake and has SUV max 4.7 while previously it was SUV max 3.4.  There was diffuse wall thickening of the esophagus with uptake in the esophagus in the fundus of the stomach and this could be seen with inflammation.    Trodelvy cycle #5 - 2/10/2021.  75% of the dose.  CA 27-29 was 337.    Trodelvy cycle #6 - 3/3/2021.  75% of the dose.  Trodelvy cycle #6, D#8 - 3/10/2021.  75% of the dose.    Trodelvy C#8, D#8 on 4/21/2021  CA 27-29 stable at 371 on 4/14/2021    Trodelvy, cycle #9- 5/5/2021        On 2/25/2020  when she had biopsy of the right acetabulum and it was consistent with metastatic lobular carcinoma.  Again it was Triple Negative.     On 3/18/2020 when she also met with Dr. Arelis Arshad who also advised testing for androgen receptor studies on the biopsy specimen.  Tumor was diffusely androgen receptor positive.      5/26/2021-cycle #10 Trodelvy     CA 27-29 was 545.    6/11/2021.  PET/CT shows mildly increased uptake in several bone lesions for example in the left anterior iliac crest, mid right iliac crest and left ischium.  Uptake in other bone lesions are similar to previously.    Because of minimal progression of the disease and she is tolerating chemotherapy very well, we decided on continuing the same chemotherapy.    6/16/2021-Trodelvy cycle #11    7/7/2021 -Trodelvy cycle #12    9/14/2021-Trodelvy-cycle #15, day #8.    9/8/2021-CA 27-29 was more elevated and it was 1176.    PET/CT on 9/24/2021 showed stable findings with no evidence of FDG avid metastatic disease within the body.  Left axillary lymph nodes are prominent and hypermetabolic and likely from recent vaccinations in the left deltoid muscle.  Healed bony metastasis seems stable.      Cycle #16, Trodelvy 9/28/2021.  Cycle #17, day #8 Trodelvy was on 10/26/2021.  Cycle #18, day #8 Trodelvy on 11/22/2021       She saw Dr. Mejia whom on 10/6/2021.  She was not considered eligible for Enfortumab trial.    Cycle #19, Trodelvy on 12/7/2021 12/21/2021.  PET/CT showed progression of bony metastatic disease.  There is increase hypermetabolism in both the right and left iliac bones and the sternum.  Other bone lesions are stable.    There is new scattered areas of groundglass throughout both the lungs and these findings are indeterminate but may represent an infectious etiology.  Interval resolution of left axillary FDG avid lymphadenopathy.    She was started on Casodex 150 mg daily on 1/6/2022.    She was admitted to the hospital from  3/17/2022-3/19/2022 for vomiting and diarrhea and  severe back pain.  I reviewed the discharge summary.  CT lumbar spine showed a stable severe chronic L1 pathologic compression fracture.  Stable mild compression deformity of superior endplate of L3 and superior endplate of L4.  The upper margin of the L4 fracture extends slightly into the spinal canal without high-grade canal stenosis and this is also stable.  Nondisplaced fractures coursing through the L3 pedicles bilaterally were seen which were not clearly seen previously this could be acute and likely pathologic.  No extraspinal soft tissue abnormality.  Neurosurgery recommended conservative management.  Her pain was controlled and she was discharged home as she felt better with this.      3/31/2022-PET/CT shows progressive bone meta stasis with progressive FDG uptake in the following lesions. Left iliac crest lesion with max SUV of 7.2, previously 3.5. Right mid iliac crest lytic lesion shows maximum SUV of 7.8, previously 6.9.  T10 vertebral body mixed lytic and sclerotic lesion with an SUV of 7.1, previously not hypermetabolic. Thoracic vertebral bodies 8, 7, 5, and 4 also show hypermetabolic lesions were previously there was no hypermetabolism.  Some of the sclerotic osseous lesions are unchanged and do not show increased hypermetabolism compatible treated lesion such as a severe compression deformity at L1 as well as superior compression deformity at L4.  Medial right clavicle sclerotic lesion with SUV max of 4.8, previously not hypermetabolic. There is also right-sided sternal lesions.      4/14/2022--C#1- switch to single agent Doxil 50 mg per metered squared every 4 weeks.    5/13/2022-cycle #2- Doxil- dose reduced to 40 mg per metered square due to severe nausea/vomiting and migraines requiring IV fluids and IV antiemetics.    6/10/2022-cycle #3-Doxil 40 mg per metered square    7/1/2022- PET/CT shows resolution of the hypermetabolic skeletal  metastasis.    7/6/2022-cycle #4-Doxil 40 mg per metered square  8/5/2022- cycle #5-Doxil  9/1/2022.  Cycle #6-Doxil  9/30/2022- cycle #7-Doxil    10/18/2022.  PET/CT does not show evidence of any FDG activity    11/2/2022-cycle #8-Doxil.  11/30/2022-cycle #9-Doxil.  12/28/2022-cycle #10-Doxil    2/3/2023- C#11- Doxil (delayed by 1 week because neutrophil count was 0.9)-wanted to give her Onpro but at that time it was not approved by insurance.    3/3/2023.  Cycle #12 Doxil.  Given with Onpro.    Repeat PET/CT on 3/27/2023 overall showed very stable findings.  PET/CT showed focal FDG avidity in the heart. This is in an expected location of AV node or mitral valve.  This is nonspecific.  We checked an echocardiogram on 4/20/2023 which was unremarkable.  She is asymptomatic.  Continue to monitor clinically.      3/31/2023.  Cycle #13 Doxil.  Given with Onpro.     4/12/2023-she presented to ED with increased migraine headaches and nausea and vomiting.  She was treated symptomatically and was discharged home after she started to feel better.      4/28/2023.  Cycle #14 Doxil.  Given with Onpro    Cycle #16 Doxil with Onpro 6/23/2023    Cycle #17 Doxil with Onpro 7/21/2023      CA 15-3 has been slowly rising.    Repeat PET/CT on 8/8/2023 showed pretty stable findings.    She has been having more pain in the lumbar spine/low back.    8/18/2023.  Cycle #18 Doxil with Onpro    9/15/2023.  Cycle #19 Doxil with Onpro.    10/16/2023.  Cycle #20 Doxil with Onpro.    11/10/2023.  PET/CT overall showed pretty stable findings.    11/15/2023.  Cycle #21 Doxil with Onpro    12/13/2023.  Cycle #22 Doxil with Onpro     1/10/2024.  Cycle #23 Doxil with Onpro     2/7/2024.  Cycle #24 Doxil with Onpro    3/1/2024.  PET/CT overall showed continues to show minimal increase in the FDG avidity of some of the bone lesions.  There is no corresponding worsening of the lesions on the CT scan.        3/6/2024.  Plan to start cycle #25 Doxil with  Onpro.        Interval history.    This is a video visit.  Overall she has been doing fairly well.  Last chemotherapy went well.  Pain is under fair control with Belbuca 600 mcg in the morning and 450 mcg in the evening.  She has to take as needed very occasionally on average once a week.  She had some right-sided rib cage area pain but that is also better.  Migraines are better.  Neuropathy is stable.  Mild nausea.  No diarrhea or constipation no new swellings.  No fevers infections or shortness of breath.     ECOG 1    ROS:  Rest of the comprehensive review of the system was negative.        I reviewed other history in epic as below.       PAST MEDICAL HISTORY:     1.  Breast cancer  2.  Multiple sclerosis.   3.  Depression.   4.  Migraines.   5.  Hypertension.   6.  Cholecystectomy and umbilical hernia repair.     SOCIAL HISTORY: She smoked for 5 years but quit many years ago.  Drinks alcohol socially.  She lives with her .  She is a teacher in middle school.       FAMILY HISTORY: She mentions that her mother had breast cancer at 49.  Both grandmothers had breast cancer but she is not sure of the age.  A couple of cousins have cancers.  Patient has 3 children who are healthy.    Current Outpatient Medications   Medication     apixaban ANTICOAGULANT (ELIQUIS ANTICOAGULANT) 5 MG tablet     Buprenorphine HCl (BELBUCA) 450 MCG FILM buccal film     Buprenorphine HCl (BELBUCA) 600 MCG FILM buccal film     busPIRone (BUSPAR) 15 MG tablet     calcium citrate-vitamin D (CITRACAL) 315-250 MG-UNIT TABS     Cholecalciferol (VITAMIN D3 PO)     doxepin (SINEQUAN) 10 MG/ML (HIGH CONC) solution     galcanezumab-gnlm (EMGALITY) 120 MG/ML injection     LORazepam (ATIVAN) 0.5 MG tablet     metoclopramide (REGLAN) 5 MG tablet     morphine (MSIR) 15 MG IR tablet     OLANZapine (ZYPREXA) 5 MG tablet     ondansetron (ZOFRAN ODT) 4 MG ODT tab     propranolol ER (INDERAL LA) 80 MG 24 hr capsule     ubrogepant (UBRELVY) 100 MG  tablet     venlafaxine (EFFEXOR XR) 75 MG 24 hr capsule     vitamin B-2 (RIBOFLAVIN) 25 MG TABS tablet     ZOLMitriptan (ZOMIG) 5 MG nasal spray     No current facility-administered medications for this visit.     Facility-Administered Medications Ordered in Other Visits   Medication     heparin 100 unit/mL injection 5 mL     sodium chloride (PF) 0.9% PF flush 10 mL        Allergies   Allergen Reactions     Bactrim [Sulfamethoxazole W/Trimethoprim]      Internal bleeding     Copaxone [Glatiramer] Hives          PHYSICAL EXAMINATION:     There were no vitals taken for this visit.  Wt Readings from Last 4 Encounters:   03/01/24 50.8 kg (112 lb)   02/07/24 54 kg (119 lb)   01/10/24 54.2 kg (119 lb 6.4 oz)   01/09/24 54.4 kg (120 lb)         Constitutional.  Does not seem to be in any acute distress.  Eyes.  No redness or discharge noted.  Respiratory.  Speaking in full sentences.  Breathing seems comfortable without any accessory use of muscles.    Skin.  Visualized his skin does not show any obvious rashes.  Musculoskeletal.  Range of motion for visualized areas is intact.  Neurological.  Alert and oriented x3.  Psychiatric.  Mood, mentation and affect are normal.  Decision making capacity is intact.      The rest of a comprehensive physical examination is deferred due to Public Health Emergency video visit restrictions.        Labs and Imaging.    Reviewed.    2/7/2024.  CBC shows WBC 6.  Hemoglobin 10.9.  Platelets 178.  ANC 4.2.  Chemistry shows calcium 8.6.  Total protein 6.1.    CA 15-3 was 524 on 2/7/2024    CA 15-3 was 452 on 1/10/2024    CA 15-3 was 408 on 12/13/2023    CA 15-3 was 287 on 11/15/2023    CA 15-3 was 237 on 10/16/2023    CA 15-3 was 242 on 9/15/2023    CA 15-3 was 193 on 8/18/2023 7/21/2023.    CA 15-3 was 186.    7/3/2023    CA 15-3 was 177 on 6/23/2023      CA 15-3 was 129 on 3/31/2023      CA 15-3 was 123 on 3/3/2023.      11/30/2022     CA 15-3 was 135.    9/30/2022  CA 15-3 was  159.  CA 27-29 was 1992 on 6/10/2022  CA 27-29 was 3370 on 5/13/2022.  It was 5307 on 4/14/2022 ferritin Doxil was started.      9/28/2021.      Iron studies, ferritin is 101, iron 51, TIBC 268 and iron saturation index 19%.      3/1/2024.  PET/CT    COMPARISON: FDG PET/CT 11/10/2023     PET/CT FINDINGS: Slight interval increased FDG uptake associated with scattered osseous lesions with reference examples including the right iliac bone (SUV max 4.4, previously 3.1), left iliac bone (SUV max 4.3, previously 3.4) and left ninth rib   costochondral junction which could be posttraumatic (SUV max 4.3, previously 3.0).     CT FINDINGS: Mild senescent intracranial changes. Redemonstrated non-FDG avid subcutaneous nodule in the occipital scalp near midline. Opacified right maxillary sinus. Calcified mediastinal/hilar lymph nodes. No significant coronary arterial   calcification. Left chest wall port catheter with tip near the SVC/R junction. Stable focal thickening near the left major fissure, possible intrapulmonary lymph node or scarring. Bilateral mastectomy with breast implants. Right axillary surgical clips.   Mild prominence of the intra and extrahepatic bile ducts which should represent reservoir effect post cholecystectomy. Scattered lytic/sclerotic osseous lesions without FDG uptake suggestive areas of treated/quiescent disease. Old healed bilateral rib   fracture deformities. Multilevel degenerative changes in the spine with chronic L1 compression deformity and L4 superior endplate deformity. The lower extremities are unremarkable.                                                                      IMPRESSION:     Overall slight increased FDG uptake within a majority of the pre-existing osseous lesions without clear evidence of progression by CT. While this could be due to technical factors, early progressive osseous metastatic disease is not excluded.     CT lumbar spine on 8/28/2023  IMPRESSION:  1.  No  evidence for acute displaced fracture. Fractures of the L1, L3 and L4 vertebral bodies appear stable from 07/31/2023.  2.  No evidence of traumatic subluxation.  3.  Diffuse osseous metastatic disease.    MRI of the lumbar spine on 7/31/2023  COMPARISON: PET/CT 03/27/2023.  TECHNIQUE: MRI Lumbar Spine without IV contrast.     FINDINGS: Examination is mildly limited by patient motion artifact.     Alignment is unchanged. Redemonstrated heterogeneous bone marrow signal throughout the visualized lumbosacral spine including numerous focal abnormal T1 hypointense and STIR hyperintense marrow replacing lesions, including confluent lesions at T12 and S2   vertebrae. Similar chronic L1 pathologic burst fracture with mild superior endplate retropulsion and severe anterior-central vertebral body height loss. Likewise, similar chronic L4 superior endplate fracture with associated mild retropulsion. Large   Schmorl's node at the L2 inferior endplate, as before. Vertical defects in the bilateral L3 pedicles are redemonstrated compatible with chronic pathologic fractures. Asymmetric STIR hyperintense signal abnormality in the left sacral ala partially   visualized, which may represent additional confluent pathologic marrow replacement. Otherwise, remaining lumbar vertebral body heights are maintained. Conus signal is normal and terminates at L2. No perivertebral soft tissue edema. There is mild edema   within the bilateral multifidus and erector spinae muscles, which may indicate posttraumatic injury/strain. Superficial subcutaneous soft tissue edema may be posttraumatic or related to positioning. Prominence of bile duct may be related to   postcholecystectomy physiologic ectasia. Small/subcentimeter right renal cyst. Last fully formed disc is designated L5-S1. Multilevel disc degeneration and height loss, severe at L5-S1.     T12-L1: No disc without bulge or protrusion. Facet joints are normal. Mild L1 superior endplate  retropulsion indents the ventral thecal sac without spinal canal stenosis. No foraminal stenosis.     L1-L2: No disc without bulge or protrusion. Facet joints are normal. No spinal canal or foraminal stenosis.     L2-L3: Bilobed left asymmetric diffuse disc bulge. Mild facet arthropathy with ligamentum flavum thickening. Mild left foraminal stenosis. No right foraminal or spinal canal stenosis.     L3-L4: Bilobed left asymmetric diffuse disc bulge. Mild facet arthropathy with ligamentum flavum thickening. In conjunction with superior endplate retropulsion, there is borderline mild central spinal canal stenosis and mild left subarticular recess   narrowing. Mild right and moderate left foraminal stenosis.     L4-L5: Small left foraminal disc protrusion. Moderate facet arthropathy. No spinal canal or foraminal stenosis.     L5-S1: No disc without bulge or protrusion. Facet joints are normal. No spinal canal or foraminal stenosis.                                                                      IMPRESSION:  1.  Extensive osseous metastases as well as chronic pathologic fractures at L1, L3, and L4, as detailed.  2.  Partially visualized asymmetric signal abnormality in the left sacral ala may represent confluent pathologic marrow replacement. Marrow edema related to sacral fracture is difficult to exclude in the appropriate clinical context.  3.  Posterior paraspinous muscular edema can be compatible with posttraumatic injury/strain.  4.  Multilevel degenerative changes, as detailed.        7/6/2023.  MRI brain  IMPRESSION:  1.  No acute ischemia, mass/mass effect, or abnormal intracranial enhancement.   2.  Similar sequela from demyelinating disease.  3.  Osseous metastases, as before.      Echocardiogram 4/20/2023  Left ventricular size, wall motion and function are normal. The ejection  fraction is 60-65%.  Global right ventricular function is normal.  No hemodynamically significant valve abnormalities.  The  inferior vena cava is normal.  No pericardial effusion.     This study was compared with the study from 4/7/22. No significant change.      3/27/2023.  PET/CT  1. No new suspicious FDG uptake within the skeleton.  2. Stable numerous non-FDG avid lytic and sclerotic osseous lesions  throughout the axial skeleton.  3. Stable nondisplaced fracture of the left acromion/glenoid, chronic  bilateral healing rib fractures, and compression deformities of the  thoracic and lumbar spine.  4. Hypermetabolic focus in the expected location of the AV node versus  mitral valve, this may represent a normal AV node versus mitral valve  pathology such as vegetations. Recommend follow-up with cardiac  Ultrasound.        11/30/2022-x-ray of the ribs of the left side  There are a few sclerotic lesion seen in the right anterior ribs that were seen on previous PET CTs. These appear to involve the fourth and fifth ribs. There are likely more subtle lesions that were   described on the PET CT scan. Irregularity of the distal end of the left ninth rib may be from metastatic disease or fracture on the oblique view which likely is chronic.       10/18/2022-PET/CT showed no new suspicious FDG uptake within the skeleton.  Stable abnormal FDG avid lytic and sclerotic osseous lesions.  Mild diffuse FDG uptake throughout the large bowel without CT abnormality.    7/1/2022.  PET/CT shows diffuse sclerotic and lytic osseous lesions without any abnormal FDG uptake in the skeleton, consistent with treated disease.  There is evaluation of previous hypermetabolic bone lesions.  Multiple chronic rib fracture deformities and stable compression fracture deformities of T6, L1 and L4.  No suspicious FDG uptake in the chest abdomen or pelvis.    3/31/2022-PET/CT shows progressive bone metastasis with progressive FDG uptake in the following lesions. Left iliac crest lesion with max SUV of 7.2, previously 3.5. Right mid iliac crest lytic lesion shows maximum SUV  of 7.8, previously 6.9.  T10 vertebral body mixed lytic and sclerotic lesion with an SUV of 7.1, previously not hypermetabolic. Thoracic vertebral bodies 8, 7, 5, and 4 also show hypermetabolic lesions were previously there was no hypermetabolism.  Some of the sclerotic osseous lesions are unchanged and do not show increased hypermetabolism compatible treated lesion such as a severe compression deformity at L1 as well as superior compression deformity at L4.  Medial right clavicle sclerotic lesion with SUV max of 4.8, previously not hypermetabolic. There is also right-sided sternal lesions.      Biopsy from the right acetabulum shows metastatic lobular carcinoma.  It is triple negative.  Androgen receptor was diffusely positive (90%, moderate intensity) by immunohistochemistry. )  PD-L1 negative.    Foundation one showed CDH1 mutation (initially lobular cancer), CCND1 amplification ( equivocal ). FGF3, FGF4, FGF19 amplification ( Equivocal ). Most promising is CCND1 amplification with abemaciclib showing response in 4/10 patients. FGF3,FGF4 and FGF19 are targetable with pan-FGFR inhibitors.     ASSESSMENT AND PLAN:     Metastatic breast cancer negative breast cancer (androgen receptor positive ) with extensive involvement of the bones.  There is also a left lower lobe hypermetabolic lesion and mediastinal lymph nodes which are hypermetabolic.  The biopsy from the right iliac bone is consistent with metastatic lobular breast cancer but it is hormone receptor and HER-2 negative and PDL 1 is also negative.    Foundation 1 testing did not reveal any actionable mutations.    She was intolerant to Xeloda and progressed on Taxol and eribulin.      We then switched to recently FDA approved sacituzumab govitecan-hziy (Trodelvy) for TNBC, based on the results of the phase II IMMU-132-01 clinical trial.   She started this on 11/11/2020.      She obtained a second opinion from Dr. Castillo from [facility] who  recommended a repeat biopsy to be done to make sure that she really has triple negative breast cancer.      She also met with Dr. Arelis Arshad on 3/18/2021.      After 10 cycles of Trodelvy, she had PET/CT on 6/11/2021 which showed mildly increased uptake in some of the bone lesions while stability in other bone lesions.        After 15 cycles, repeat PET scan showed stable findings.  CA 27-29 has been increasing and it is 1176.      As she was tolerating the chemotherapy well and her quality of life has been decent and scans were stable although she had a rising CA 27-29, we decided on continuing the same chemotherapy because it has been a very slow progression of the disease.    Then she had clear progression of the disease in the bones with increased FDG uptake in both right and left iliac bones and the sternum.    Foundation one showed CDH1 mutation (initially lobular cancer), CCND1 amplification ( equivocal ). FGF3, FGF4, FGF19 amplification ( Equivocal ). Most promising is CCND1 amplification with abemaciclib showing response in 4/10 patients. FGF3,FGF4 and FGF19 are targetable with pan-FGFR inhibitor    As the tumor is diffusely positive for androgen receptor, we decided to start her on androgen receptor blockers.    I initially recommended enzalutamide 160 mg daily ( Enzalutamide for the Treatment of Androgen Receptor-Expressing Triple-Negative Breast Cancer----J Clin Oncol. 2018 Mar 20; 36(9): 884-890. ).    Eventually we decided to start her on Casodex 150 mg daily instead of Enzalutamide due to cost issues.  Clinical Trial Clin Cancer Res. 2013 Oct 1;19(19):5505-12.   Phase II trial of bicalutamide in patients with androgen receptor-positive, estrogen receptor-negative metastatic Breast Cancer  She started Casodex on 1/6/2022.    She had progressive disease in the bones on the PET scan on 3/31/2022.    We changed to single agent Doxil on 4/14/2022.      She developed significant nausea vomiting and headache  so cycle #2 was given on 5/13/2022 with 20% dose reduction which she tolerated well.    Cycle #3 was given on 6/10/2022 with the same dose reduced Doxil.    Repeat PET/CT on 7/1/2022 shows resolution of the FDG avid bony metastasis consistent with treated disease.  No suspicious FDG uptake was seen in the chest abdomen and pelvis.  CA 27-29 was also coming down.    CA 15-3 has been slowly rising.    Repeat PET/CT on 8/8/2023 showed pretty stable findings.    She has been having more pain in the lumbar spine/low back.    Otherwise tolerating chemotherapy well.  As the progression has been very slow and mild and overall she has been tolerating chemotherapy well, we decided to continue the same chemotherapy.    Over time CA 15-3 has continued to slowly increase but the PET scan on 11/10/2023 overall showed very stable findings.    We decided to continue Doxil     She continues to tolerate chemotherapy well.  Repeat PET/CT from 3/1/2024 showed minimally increased FDG avidity in some of the osseous metastatic lesions but there is no clear evidence of progression by CT scan.  CA 15-3 continues to slowly increase.    We discussed that overall this is consistent with very slow and mild progression of the cancer.    We discussed the situation in detail and decided to continue with the oxygen for now considering overall she feels about the same as before and tolerating Doxil well and the cancer is growing very slowly and there has been little progression clinically.     In the future we can potentially consider another chemotherapy like carboplatin or gemcitabine.    Chemotherapy-induced neutropenia.    Continue Onpro support.    Anemia.  She has mild anemia from chemotherapy.  Repeat labs tomorrow.     Bone disease.  She has metastatic disease to the bone.  Previously she got palliative radiation to T12-L5 3000 cGy in 10 fractions from 9/6/2019 till 9/18/2019.  She was evaluated by orthopedics Dr. Bipin Elliott on  11/1/2019 and conservative management was recommended.    She was on Zometa every 3 months. She last received on 9/29/2020.  Because of progression of the disease in the bones, we switched to Xgeva which she received on 11/11/2020.    She had progression of the disease in the bones so we switched to Doxil but we continued xgeva.  PET scan on 7/1/2022 does not show any suspicious FDG uptake in the bones consistent with treated disease.  Repeat PET/CT in March 2023 was without any active FDG avid lesions.  Overall PET scan from 11/10/2023 remains pretty stable with slight decrease in FDG avidity of some lesions was a slight increase in some but overall showing pretty stable findings.  She continues to be on monthly Xgeva and she takes calcium and vitamin D.  Repeat PET/CT from 3/1/2024 showed mild increase in FDG avidity in some of the bone lesions but CT scan does not show progression in these areas.  Continue same treatment.    Cancer related pain.  She was also seen by Dr. Yusuf from pain clinic and she got bilateral SI joint steroid injections on 10/13/2023 which did not help much.  She is following with Dr. Whitaker and currently she is on buprenorphine buccal film to 600 mcg in the morning and 450 mcg in the evening which is helping with the pain.  She has to take morphine as needed only occasionally.      Nausea.  She has mild occasional nausea.  Take Zofran as needed.     Migraines.  Doing much better with Emgality.  She takes as needed Ubrelvy which helps.  She is following with neurology.      Bilateral pulmonary emboli.  Continue Eliquis.        We did not address the following today.      Shingles.  The rash has dried out.  She completed well with acyclovir and prednisone.  She takes gabapentin for postherpetic neuralgia and is doing better.      Neuropathy.  This remains mild and stable with neuropathy in her hands.    This is in addition to the chronic balance issues and heat and cold sensitivity from  underlying multiple sclerosis which is also stable for years.  Continue to monitor.     Subcutaneous nodule in the scalp.  This looks like an epidermoid cyst.  She thinks it is a stable.  Continue to monitor.       Insomnia.  Continue trazodone.      Wall thickening of esophagus and FDG uptake of fundus of the stomach.  It could be in the setting of inflammation from recent nausea and vomiting.  Symptoms have resolved.  Continue to monitor clinically.    Vision changes.    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  It was thought that this could be due to Taxol as patient reported to the ophthalmologist that she was on Taxol in September 2019 when her symptoms started.  But she was not on Taxol in September 2019 when she started noticing the visual changes so Taxol is not responsible for this.  The first dose of Taxol was on 11/5/2019.   She had left cataract extraction on 2/8/2021 and she has noticed significant improvement in her vision.  Thinks that her vision is back to her usual.      Increased FDG ability in the colon.  She had FDG uptake in the cecum and ascending colon but no corresponding lesion was seen on the CT scan.  She is completely asymptomatic so at this time we will continue to observe.    Discussion regarding healthcare directives.  On previous visit she told me that she will bring the health care directive for our records but she forgot so I told her to bring it on next visit.      Breast implant removal.  She tells me that she was planning to do breast implant removal because there was a recall on this.  Her surgery was scheduled for 9/24/2019.  Because of this new development of metastatic breast cancer and her recent radiation, surgery has been canceled.  We discussed that at this time treatment for metastatic breast cancer would take precedence over the other surgery as the other surgery would require her to be off chemotherapy for several weeks and in  that case the chances of cancer progression would be high and I would not recommend that.    Multiple sclerosis.  She will continue to follow with her neurologist Dr. Quiles.  Currently multiple sclerosis is under control and currently she is not taking any medications.    She will see nurse practitioner in 4 weeks and I will see her back in 8 weeks.    All of her questions were answered to her satisfaction.  She is agreeable and comfortable with the plan.    Dewayne Zepeda MD          Again, thank you for allowing me to participate in the care of your patient.        Sincerely,        Dewayne Zepeda MD

## 2024-03-05 NOTE — NURSING NOTE
Is the patient currently in the state of [place]? YES    Visit mode:VIDEO    If the visit is dropped, the patient can be reconnected by: VIDEO VISIT: Text to cell phone:   Telephone Information:   Mobile [phone]       Will anyone else be joining the visit? NO  (If patient encounters technical issues they should call [phone]    How would you like to obtain your AVS? MyChart    Are changes needed to the allergy or medication list? Pt stated no med changes    Reason for visit: RECHECK    No other vitals to report per pt    Lacey MILLERF

## 2024-03-05 NOTE — PROGRESS NOTES
Virtual Visit Details    Type of service:  Video Visit     Originating Location (pt. Location): Home    Distant Location (provider location):  Off-site  Platform used for Video Visit: [employer]    Video start  time. 11:00 AM  Video stop time. 11:11 AM         ONCOLOGY FOLLOW UP NOTE: 3/05/24        I took the history from reviewing the previous notes that she was following with Dr. Amaya.  I have copied and updated from prior notes.    ONCOLOGY History:    1.  January 2006:  Diagnosed with Stage IIB, T2 N1 M0 invasive lobular carcinoma of the right breast.  Final pathology showed a 4.5 x 3.8 x 2.5 cm, 01/14 lymph nodes positive.  Estrogen, progesterone receptor positive, HER-2 negative.     2.  Genetics.  BRCA1 and 2 mutations not detected. 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       THERAPY TO DATE:   1.  2006:  ECOG 2104 protocol of dose-dense Adriamycin, Cytoxan and Avastin x4 cycles followed by Taxol and Avastin x4 cycles.   2.  03/2007:  Completed 1 year Avastin.   3.  11/2006-01/2007:  Radiotherapy to the right breast of 5040 cGy.   4.  03/[id number]:  Aromasin.  Stopped after moving to the [facility].   5.  01/2016:  Right modified radical mastectomy with latissimus dorsi flap reconstruction and a left prophylactic mastectomy with latissimus dorsi flap reconstruction.     She recently had MRI of the brain as a follow-up for multiple sclerosis and there were multiple calvarial lesions noted which was suspicious for metastatic disease.  There was no evidence of new multiple sclerosis lesions.  She had a PET scan on 8/30/2019 which showed widespread bone metastatic lesions (calvarium, spine, sacrum, pelvis, ribs most prominent at C7, T3, L1 and L4), hypermetabolic 3 cm lesion in LLL, and mediastinal/hilar LN. CEA wnl at 1.9 and C27-29 elevated to 415 (28 on 8/23/16). A CT guided biopsy of the right iliac bone was obtained on 9/4/19, pathology consistent with metastatic adenocarcinoma (breast), ER/[place]  negative, HER-2 negative PDL 1 < 1%. A second biopsy was take of the LLL nodule via EBUS on 9/5/19 did not show any malignancy.    C/T/L spine MRI 8/3/19 to 9/2/19 with numerous enhancing lesions of the C, T and L spine, largest around T9 and L1. No evidence of cord compression. Has a L1 compression fracture and impending L4.     Received radiation T12-L5 3000 cGy in 10 fractions from 9/6/2019 till 9/18/2019.  Neurosurgery recommended no surgical intervention but to wear [place] brace when out of bed and HOB >30 degrees    She started palliative Xeloda 2000/1500 on 10/1/2019 but after just a few doses she noticed nausea, red eyes blurred vision, loss of appetite.  She stopped taking it after 5 doses and was seen by nurse practitioner on 10/7/2019 when she was improving.  At that point she was restarted on Xeloda at a lower dose of 1000 mg twice a day.  As she was not able to tolerate even the lower dose with extreme nausea and vomiting and feeling extremely fatigued and blurry vision, we decided on stopping Xeloda.    We decided on repeating scans and MRI brain on 10/25/2019 showed no intracranial metastatic disease.   Multiple enhancing calvarial lesions may be increased in number and are suggestive of metastatic disease. Thinner imaging on the current study may identify the smaller lesions and may be responsible for  identified more lesions.   There is a single focus of T2 hyperintense signal within the anterior right temporal lobe may represent interval demyelination. There is no evidence for active demyelination.    PET/CT on 11/1/2019 showed favorable response to therapy and Overall FDG uptake within scattered osseous metastases is decreased since 8/30/2019, particularly within the pelvis. Increased sclerotic appearance of L1 and L4 pathologic compression deformities, likely sequela of recently completed palliative radiation therapy.    No significant FDG uptake in previously demonstrated hypermetabolic  mediastinal lymph nodes. Biopsy negative on 9/5/2019.  There is also decreased FDG uptake in the left lower lobe (biopsy negative for  malignancy), now with dense consolidation containing air bronchograms suggestive of infection versus aspiration.  There is diffuse FDG uptake within the esophagus, consistent with esophagitis.    Because of intolerance to Xeloda we decided on switching to weekly paclitaxel on 11/5/2019.    C#2 Taxol 12/4/19- started with 70mg/m2 dose reduction    C#3 Taxol 12/30/2019     PET/CT on 1/24/2020 showed progression of the disease in some of the bone lesions including increase in size and lytic lesion within the vertebral body of C4 measuring 1 cm as opposed to 0.6 cm previously and a new central soft tissue nodule with SUV max 4.1 when previously it was non-hypermetabolic.  There is also some progression noted in the right proximal femur and right iliac bone.  There is decreased metabolic uptake in the right lamina of T9 with SUV max 4.7 when previously it was 8.0.  Other lesions are stable.    CA 27-29 also had increased significantly and it was 257 on 1/28/2020.    We decided on switching to eribulin on 1/28/2020.    C#2 2/18/2020  C#2 D#8 2/25/2020    C#3 D#1 3/18/2020 -delayed by 1 week as per patient's preference because she wanted to visit her family.    She had a repeat PET/CT on 3/27/2020 which showed stable size of the bone metastatic lesions although the FDG avidity was less, consistent with treatment response.    She had MRI of the thoracic spine on 4/3/2020 and it was compared to the one which was done in August 2019 and it showed increased size of several metastatic lesions most notably at T9 but also at T5-T7.  Other lesions at T10, T11 and T12 appeared improved.    CA 27-29 was also down to 222 on 3/18/2020.    Cycle #4 eribulin ( dose reduced to 1.2 mg/m2 )-4/7/2020-   Cycle #5 Eribulin ( 1.2 mg/m2 )- 4/28/2020   Cycle #6 Eribulin ( 1.2 mg/m2 )- 5/19/2020     Cycle #7  Eribulin ( 1.2 mg/m2 )- 6/9/2020    Cycle #8 Eribulin ( 1.2 mg/m2 )- 6/30/2020     She had a repeat PET scan on 7/17/2020 which showed incidental finding of new acute bilateral pulmonary embolism in the distal left main pulmonary artery extending in the left upper and lower lobes and in the segmental and branch arteries in the right lower lobe.    It also showed mildly increased FDG avidity in the lytic lesion in the T9 lamina and right pedicle with SUV max 5.3, previously 3.9.  That is also slightly increased FDG uptake to the left anterior fifth rib at the costochondral junction SUV max 3.9, previously 2.5.  There is slightly decreased FDG uptake in the right femoral neck lesion SUV max 2.2, previously 2.9.  FDG uptake in other bone lesions is fairly stable.  No new metastasis are seen.    CA 27-29 was 308 on 6/30/2020.  It was 286 on 5/19/2020.    Overall this is consistent with only slight progression versus stable disease.    She was started on Lovenox.    Cycle #9 eribulin 7/21/2020. CA 27-29 was 238    C#10 eribulin 8/18/2020. ( delayed by one week for ANC 0.9 )    C#11 Eribulin 9/8/2020    C#12 Eribulin 9/29/2020     C#13 Eribulin 10/20/2020     PET scan on 11/6/2020 shows progression of the disease with increased FDG uptake in the lytic lesions in the T9/T10 and right posterolateral elements as well as increased FDG uptake in the previously known hypermetabolic right iliac bone lesion suggesting recurrence of previously treated metastasis.  There is new subtle lesion in the right manubrium.  There is also a new fracture in the anterolateral left fourth rib with associated uptake and this could be a pathologic fracture.  Healing fracture in the left anterior fifth rib lesion.  There is resolution of the left pulmonary emboli.  Decreased FDG uptake of the esophagus consistent with improving inflammation.    CA 27-29 on 10/20/2020 was also elevated at 489.    C#1 Trodelvy on 11/11/2020.  Cycle #2 Trodelvy  12/2/2020  Cycle number 2-day #8 Trodelvy 12/8/2020.    12/15/2020-12/16/2020.  She was admitted to the hospital with nausea vomiting and dehydration.  She had tachycardia and initial lactic acid of 5.  It decreased to 1.4 after 2 L of normal saline.  She also had hypokalemia and ANC was 1.3.  She was treated with IV fluids.  Procalcitonin was negative.  CTA of the chest was negative for pulmonary embolism or pneumonia.  No bacterial infection was documented.  Her medication which she was initially unable to tolerate at home, were restarted and she was discharged home once started to feel better.      We delayed the start of cycle number 3 x 1 week and decrease the dose of chemotherapy by 25%.  She also had neutropenia with ANC of 1.0.      She started cycle number 3-day #1 on 12/29/2020.  CA 27-29 was 392.    Cycle number 3-day #8 1/5/2021.    C#4 Trodelvy 1/20/2021- 75% dose    2/5/2021.  PET/CT overall shows a good response to treatment with improvement of previously hypermetabolic lesions in the spine and pelvis and stability of other bone meta stasis.  There is a single lytic lesion in the right iliac bone which demonstrates slight Yimi increased FDG uptake and has SUV max 4.7 while previously it was SUV max 3.4.  There was diffuse wall thickening of the esophagus with uptake in the esophagus in the fundus of the stomach and this could be seen with inflammation.    Trodelvy cycle #5 - 2/10/2021.  75% of the dose.  CA 27-29 was 337.    Trodelvy cycle #6 - 3/3/2021.  75% of the dose.  Trodelvy cycle #6, D#8 - 3/10/2021.  75% of the dose.    Trodelvy C#8, D#8 on 4/21/2021  CA 27-29 stable at 371 on 4/14/2021    Trodelvy, cycle #9- 5/5/2021        On 2/25/2020 when she had biopsy of the right acetabulum and it was consistent with metastatic lobular carcinoma.  Again it was Triple Negative.     On 3/18/2020 when she also met with Dr. Arelis Arshad who also advised testing for androgen receptor studies on the biopsy  specimen.  Tumor was diffusely androgen receptor positive.      5/26/2021-cycle #10 Trodelvy     CA 27-29 was 545.    6/11/2021.  PET/CT shows mildly increased uptake in several bone lesions for example in the left anterior iliac crest, mid right iliac crest and left ischium.  Uptake in other bone lesions are similar to previously.    Because of minimal progression of the disease and she is tolerating chemotherapy very well, we decided on continuing the same chemotherapy.    6/16/2021-Trodelvy cycle #11    7/7/2021 -Trodelvy cycle #12    9/14/2021-Trodelvy-cycle #15, day #8.    9/8/2021-CA 27-29 was more elevated and it was 1176.    PET/CT on 9/24/2021 showed stable findings with no evidence of FDG avid metastatic disease within the body.  Left axillary lymph nodes are prominent and hypermetabolic and likely from recent vaccinations in the left deltoid muscle.  Healed bony metastasis seems stable.      Cycle #16, Trodelvy 9/28/2021.  Cycle #17, day #8 Trodelvy was on 10/26/2021.  Cycle #18, day #8 Trodelvy on 11/22/2021       She saw Dr. Mejia whom on 10/6/2021.  She was not considered eligible for Enfortumab trial.    Cycle #19, Trodelvy on 12/7/2021 12/21/2021.  PET/CT showed progression of bony metastatic disease.  There is increase hypermetabolism in both the right and left iliac bones and the sternum.  Other bone lesions are stable.    There is new scattered areas of groundglass throughout both the lungs and these findings are indeterminate but may represent an infectious etiology.  Interval resolution of left axillary FDG avid lymphadenopathy.    She was started on Casodex 150 mg daily on 1/6/2022.    She was admitted to the hospital from 3/17/2022-3/19/2022 for vomiting and diarrhea and  severe back pain.  I reviewed the discharge summary.  CT lumbar spine showed a stable severe chronic L1 pathologic compression fracture.  Stable mild compression deformity of superior endplate of L3 and superior endplate  of L4.  The upper margin of the L4 fracture extends slightly into the spinal canal without high-grade canal stenosis and this is also stable.  Nondisplaced fractures coursing through the L3 pedicles bilaterally were seen which were not clearly seen previously this could be acute and likely pathologic.  No extraspinal soft tissue abnormality.  Neurosurgery recommended conservative management.  Her pain was controlled and she was discharged home as she felt better with this.      3/31/2022-PET/CT shows progressive bone meta stasis with progressive FDG uptake in the following lesions. Left iliac crest lesion with max SUV of 7.2, previously 3.5. Right mid iliac crest lytic lesion shows maximum SUV of 7.8, previously 6.9.  T10 vertebral body mixed lytic and sclerotic lesion with an SUV of 7.1, previously not hypermetabolic. Thoracic vertebral bodies 8, 7, 5, and 4 also show hypermetabolic lesions were previously there was no hypermetabolism.  Some of the sclerotic osseous lesions are unchanged and do not show increased hypermetabolism compatible treated lesion such as a severe compression deformity at L1 as well as superior compression deformity at L4.  Medial right clavicle sclerotic lesion with SUV max of 4.8, previously not hypermetabolic. There is also right-sided sternal lesions.      4/14/2022--C#1- switch to single agent Doxil 50 mg per metered squared every 4 weeks.    5/13/2022-cycle #2- Doxil- dose reduced to 40 mg per metered square due to severe nausea/vomiting and migraines requiring IV fluids and IV antiemetics.    6/10/2022-cycle #3-Doxil 40 mg per metered square    7/1/2022- PET/CT shows resolution of the hypermetabolic skeletal metastasis.    7/6/2022-cycle #4-Doxil 40 mg per metered square  8/5/2022- cycle #5-Doxil  9/1/2022.  Cycle #6-Doxil  9/30/2022- cycle #7-Doxil    10/18/2022.  PET/CT does not show evidence of any FDG activity    11/2/2022-cycle #8-Doxil.  11/30/2022-cycle  #9-Doxil.  12/28/2022-cycle #10-Doxil    2/3/2023- C#11- Doxil (delayed by 1 week because neutrophil count was 0.9)-wanted to give her Onpro but at that time it was not approved by insurance.    3/3/2023.  Cycle #12 Doxil.  Given with Onpro.    Repeat PET/CT on 3/27/2023 overall showed very stable findings.  PET/CT showed focal FDG avidity in the heart. This is in an expected location of AV node or mitral valve.  This is nonspecific.  We checked an echocardiogram on 4/20/2023 which was unremarkable.  She is asymptomatic.  Continue to monitor clinically.      3/31/2023.  Cycle #13 Doxil.  Given with Onpro.     4/12/2023-she presented to ED with increased migraine headaches and nausea and vomiting.  She was treated symptomatically and was discharged home after she started to feel better.      4/28/2023.  Cycle #14 Doxil.  Given with Onpro    Cycle #16 Doxil with Onpro 6/23/2023    Cycle #17 Doxil with Onpro 7/21/2023      CA 15-3 has been slowly rising.    Repeat PET/CT on 8/8/2023 showed pretty stable findings.    She has been having more pain in the lumbar spine/low back.    8/18/2023.  Cycle #18 Doxil with Onpro    9/15/2023.  Cycle #19 Doxil with Onpro.    10/16/2023.  Cycle #20 Doxil with Onpro.    11/10/2023.  PET/CT overall showed pretty stable findings.    11/15/2023.  Cycle #21 Doxil with Onpro    12/13/2023.  Cycle #22 Doxil with Onpro     1/10/2024.  Cycle #23 Doxil with Onpro     2/7/2024.  Cycle #24 Doxil with Onpro    3/1/2024.  PET/CT overall showed continues to show minimal increase in the FDG avidity of some of the bone lesions.  There is no corresponding worsening of the lesions on the CT scan.        3/6/2024.  Plan to start cycle #25 Doxil with Onpro.        Interval history.    This is a video visit.  Overall she has been doing fairly well.  Last chemotherapy went well.  Pain is under fair control with Belbuca 600 mcg in the morning and 450 mcg in the evening.  She has to take as needed very  occasionally on average once a week.  She had some right-sided rib cage area pain but that is also better.  Migraines are better.  Neuropathy is stable.  Mild nausea.  No diarrhea or constipation no new swellings.  No fevers infections or shortness of breath.     ECOG 1    ROS:  Rest of the comprehensive review of the system was negative.        I reviewed other history in epic as below.       PAST MEDICAL HISTORY:     1.  Breast cancer  2.  Multiple sclerosis.   3.  Depression.   4.  Migraines.   5.  Hypertension.   6.  Cholecystectomy and umbilical hernia repair.     SOCIAL HISTORY: She smoked for 5 years but quit many years ago.  Drinks alcohol socially.  She lives with her .  She is a teacher in middle school.       FAMILY HISTORY: She mentions that her mother had breast cancer at 49.  Both grandmothers had breast cancer but she is not sure of the age.  A couple of cousins have cancers.  Patient has 3 children who are healthy.    Current Outpatient Medications   Medication    apixaban ANTICOAGULANT (ELIQUIS ANTICOAGULANT) 5 MG tablet    Buprenorphine HCl (BELBUCA) 450 MCG FILM buccal film    Buprenorphine HCl (BELBUCA) 600 MCG FILM buccal film    busPIRone (BUSPAR) 15 MG tablet    calcium citrate-vitamin D (CITRACAL) 315-250 MG-UNIT TABS    Cholecalciferol (VITAMIN D3 PO)    doxepin (SINEQUAN) 10 MG/ML (HIGH CONC) solution    galcanezumab-gnlm (EMGALITY) 120 MG/ML injection    LORazepam (ATIVAN) 0.5 MG tablet    metoclopramide (REGLAN) 5 MG tablet    morphine (MSIR) 15 MG IR tablet    OLANZapine (ZYPREXA) 5 MG tablet    ondansetron (ZOFRAN ODT) 4 MG ODT tab    propranolol ER (INDERAL LA) 80 MG 24 hr capsule    ubrogepant (UBRELVY) 100 MG tablet    venlafaxine (EFFEXOR XR) 75 MG 24 hr capsule    vitamin B-2 (RIBOFLAVIN) 25 MG TABS tablet    ZOLMitriptan (ZOMIG) 5 MG nasal spray     No current facility-administered medications for this visit.     Facility-Administered Medications Ordered in Other Visits    Medication    heparin 100 unit/mL injection 5 mL    sodium chloride (PF) 0.9% PF flush 10 mL        Allergies   Allergen Reactions    Bactrim [Sulfamethoxazole W/Trimethoprim]      Internal bleeding    Copaxone [Glatiramer] Hives          PHYSICAL EXAMINATION:     There were no vitals taken for this visit.  Wt Readings from Last 4 Encounters:   03/01/24 50.8 kg (112 lb)   02/07/24 54 kg (119 lb)   01/10/24 54.2 kg (119 lb 6.4 oz)   01/09/24 54.4 kg (120 lb)         Constitutional.  Does not seem to be in any acute distress.  Eyes.  No redness or discharge noted.  Respiratory.  Speaking in full sentences.  Breathing seems comfortable without any accessory use of muscles.    Skin.  Visualized his skin does not show any obvious rashes.  Musculoskeletal.  Range of motion for visualized areas is intact.  Neurological.  Alert and oriented x3.  Psychiatric.  Mood, mentation and affect are normal.  Decision making capacity is intact.      The rest of a comprehensive physical examination is deferred due to Public Health Emergency video visit restrictions.        Labs and Imaging.    Reviewed.    2/7/2024.  CBC shows WBC 6.  Hemoglobin 10.9.  Platelets 178.  ANC 4.2.  Chemistry shows calcium 8.6.  Total protein 6.1.    CA 15-3 was 524 on 2/7/2024    CA 15-3 was 452 on 1/10/2024    CA 15-3 was 408 on 12/13/2023    CA 15-3 was 287 on 11/15/2023    CA 15-3 was 237 on 10/16/2023    CA 15-3 was 242 on 9/15/2023    CA 15-3 was 193 on 8/18/2023 7/21/2023.    CA 15-3 was 186.    7/3/2023    CA 15-3 was 177 on 6/23/2023      CA 15-3 was 129 on 3/31/2023      CA 15-3 was 123 on 3/3/2023.      11/30/2022     CA 15-3 was 135.    9/30/2022  CA 15-3 was 159.  CA 27-29 was 1992 on 6/10/2022  CA 27-29 was 3370 on 5/13/2022.  It was 5307 on 4/14/2022 ferritin Doxil was started.      9/28/2021.      Iron studies, ferritin is 101, iron 51, TIBC 268 and iron saturation index 19%.      3/1/2024.  PET/CT    COMPARISON: FDG PET/CT  11/10/2023     PET/CT FINDINGS: Slight interval increased FDG uptake associated with scattered osseous lesions with reference examples including the right iliac bone (SUV max 4.4, previously 3.1), left iliac bone (SUV max 4.3, previously 3.4) and left ninth rib   costochondral junction which could be posttraumatic (SUV max 4.3, previously 3.0).     CT FINDINGS: Mild senescent intracranial changes. Redemonstrated non-FDG avid subcutaneous nodule in the occipital scalp near midline. Opacified right maxillary sinus. Calcified mediastinal/hilar lymph nodes. No significant coronary arterial   calcification. Left chest wall port catheter with tip near the SVC/R junction. Stable focal thickening near the left major fissure, possible intrapulmonary lymph node or scarring. Bilateral mastectomy with breast implants. Right axillary surgical clips.   Mild prominence of the intra and extrahepatic bile ducts which should represent reservoir effect post cholecystectomy. Scattered lytic/sclerotic osseous lesions without FDG uptake suggestive areas of treated/quiescent disease. Old healed bilateral rib   fracture deformities. Multilevel degenerative changes in the spine with chronic L1 compression deformity and L4 superior endplate deformity. The lower extremities are unremarkable.                                                                      IMPRESSION:     Overall slight increased FDG uptake within a majority of the pre-existing osseous lesions without clear evidence of progression by CT. While this could be due to technical factors, early progressive osseous metastatic disease is not excluded.     CT lumbar spine on 8/28/2023  IMPRESSION:  1.  No evidence for acute displaced fracture. Fractures of the L1, L3 and L4 vertebral bodies appear stable from 07/31/2023.  2.  No evidence of traumatic subluxation.  3.  Diffuse osseous metastatic disease.    MRI of the lumbar spine on 7/31/2023  COMPARISON: PET/CT  03/27/2023.  TECHNIQUE: MRI Lumbar Spine without IV contrast.     FINDINGS: Examination is mildly limited by patient motion artifact.     Alignment is unchanged. Redemonstrated heterogeneous bone marrow signal throughout the visualized lumbosacral spine including numerous focal abnormal T1 hypointense and STIR hyperintense marrow replacing lesions, including confluent lesions at T12 and S2   vertebrae. Similar chronic L1 pathologic burst fracture with mild superior endplate retropulsion and severe anterior-central vertebral body height loss. Likewise, similar chronic L4 superior endplate fracture with associated mild retropulsion. Large   Schmorl's node at the L2 inferior endplate, as before. Vertical defects in the bilateral L3 pedicles are redemonstrated compatible with chronic pathologic fractures. Asymmetric STIR hyperintense signal abnormality in the left sacral ala partially   visualized, which may represent additional confluent pathologic marrow replacement. Otherwise, remaining lumbar vertebral body heights are maintained. Conus signal is normal and terminates at L2. No perivertebral soft tissue edema. There is mild edema   within the bilateral multifidus and erector spinae muscles, which may indicate posttraumatic injury/strain. Superficial subcutaneous soft tissue edema may be posttraumatic or related to positioning. Prominence of bile duct may be related to   postcholecystectomy physiologic ectasia. Small/subcentimeter right renal cyst. Last fully formed disc is designated L5-S1. Multilevel disc degeneration and height loss, severe at L5-S1.     T12-L1: No disc without bulge or protrusion. Facet joints are normal. Mild L1 superior endplate retropulsion indents the ventral thecal sac without spinal canal stenosis. No foraminal stenosis.     L1-L2: No disc without bulge or protrusion. Facet joints are normal. No spinal canal or foraminal stenosis.     L2-L3: Bilobed left asymmetric diffuse disc bulge. Mild  facet arthropathy with ligamentum flavum thickening. Mild left foraminal stenosis. No right foraminal or spinal canal stenosis.     L3-L4: Bilobed left asymmetric diffuse disc bulge. Mild facet arthropathy with ligamentum flavum thickening. In conjunction with superior endplate retropulsion, there is borderline mild central spinal canal stenosis and mild left subarticular recess   narrowing. Mild right and moderate left foraminal stenosis.     L4-L5: Small left foraminal disc protrusion. Moderate facet arthropathy. No spinal canal or foraminal stenosis.     L5-S1: No disc without bulge or protrusion. Facet joints are normal. No spinal canal or foraminal stenosis.                                                                      IMPRESSION:  1.  Extensive osseous metastases as well as chronic pathologic fractures at L1, L3, and L4, as detailed.  2.  Partially visualized asymmetric signal abnormality in the left sacral ala may represent confluent pathologic marrow replacement. Marrow edema related to sacral fracture is difficult to exclude in the appropriate clinical context.  3.  Posterior paraspinous muscular edema can be compatible with posttraumatic injury/strain.  4.  Multilevel degenerative changes, as detailed.        7/6/2023.  MRI brain  IMPRESSION:  1.  No acute ischemia, mass/mass effect, or abnormal intracranial enhancement.   2.  Similar sequela from demyelinating disease.  3.  Osseous metastases, as before.      Echocardiogram 4/20/2023  Left ventricular size, wall motion and function are normal. The ejection  fraction is 60-65%.  Global right ventricular function is normal.  No hemodynamically significant valve abnormalities.  The inferior vena cava is normal.  No pericardial effusion.     This study was compared with the study from 4/7/22. No significant change.      3/27/2023.  PET/CT  1. No new suspicious FDG uptake within the skeleton.  2. Stable numerous non-FDG avid lytic and sclerotic osseous  lesions  throughout the axial skeleton.  3. Stable nondisplaced fracture of the left acromion/glenoid, chronic  bilateral healing rib fractures, and compression deformities of the  thoracic and lumbar spine.  4. Hypermetabolic focus in the expected location of the AV node versus  mitral valve, this may represent a normal AV node versus mitral valve  pathology such as vegetations. Recommend follow-up with cardiac  Ultrasound.        11/30/2022-x-ray of the ribs of the left side  There are a few sclerotic lesion seen in the right anterior ribs that were seen on previous PET CTs. These appear to involve the fourth and fifth ribs. There are likely more subtle lesions that were   described on the PET CT scan. Irregularity of the distal end of the left ninth rib may be from metastatic disease or fracture on the oblique view which likely is chronic.       10/18/2022-PET/CT showed no new suspicious FDG uptake within the skeleton.  Stable abnormal FDG avid lytic and sclerotic osseous lesions.  Mild diffuse FDG uptake throughout the large bowel without CT abnormality.    7/1/2022.  PET/CT shows diffuse sclerotic and lytic osseous lesions without any abnormal FDG uptake in the skeleton, consistent with treated disease.  There is evaluation of previous hypermetabolic bone lesions.  Multiple chronic rib fracture deformities and stable compression fracture deformities of T6, L1 and L4.  No suspicious FDG uptake in the chest abdomen or pelvis.    3/31/2022-PET/CT shows progressive bone metastasis with progressive FDG uptake in the following lesions. Left iliac crest lesion with max SUV of 7.2, previously 3.5. Right mid iliac crest lytic lesion shows maximum SUV of 7.8, previously 6.9.  T10 vertebral body mixed lytic and sclerotic lesion with an SUV of 7.1, previously not hypermetabolic. Thoracic vertebral bodies 8, 7, 5, and 4 also show hypermetabolic lesions were previously there was no hypermetabolism.  Some of the sclerotic  osseous lesions are unchanged and do not show increased hypermetabolism compatible treated lesion such as a severe compression deformity at L1 as well as superior compression deformity at L4.  Medial right clavicle sclerotic lesion with SUV max of 4.8, previously not hypermetabolic. There is also right-sided sternal lesions.      Biopsy from the right acetabulum shows metastatic lobular carcinoma.  It is triple negative.  Androgen receptor was diffusely positive (90%, moderate intensity) by immunohistochemistry. )  PD-L1 negative.    Foundation one showed CDH1 mutation (initially lobular cancer), CCND1 amplification ( equivocal ). FGF3, FGF4, FGF19 amplification ( Equivocal ). Most promising is CCND1 amplification with abemaciclib showing response in 4/10 patients. FGF3,FGF4 and FGF19 are targetable with pan-FGFR inhibitors.     ASSESSMENT AND PLAN:     Metastatic breast cancer negative breast cancer (androgen receptor positive ) with extensive involvement of the bones.  There is also a left lower lobe hypermetabolic lesion and mediastinal lymph nodes which are hypermetabolic.  The biopsy from the right iliac bone is consistent with metastatic lobular breast cancer but it is hormone receptor and HER-2 negative and PDL 1 is also negative.    Foundation 1 testing did not reveal any actionable mutations.    She was intolerant to Xeloda and progressed on Taxol and eribulin.      We then switched to recently FDA approved sacituzumab govitecan-hziy (Trodelvy) for TNBC, based on the results of the phase II IMMU-132-01 clinical trial.   She started this on 11/11/2020.      She obtained a second opinion from Dr. Castillo from [facility] who recommended a repeat biopsy to be done to make sure that she really has triple negative breast cancer.      She also met with Dr. Arelis Arshad on 3/18/2021.      After 10 cycles of Trodelvy, she had PET/CT on 6/11/2021 which showed mildly increased uptake in some of the bone  lesions while stability in other bone lesions.        After 15 cycles, repeat PET scan showed stable findings.  CA 27-29 has been increasing and it is 1176.      As she was tolerating the chemotherapy well and her quality of life has been decent and scans were stable although she had a rising CA 27-29, we decided on continuing the same chemotherapy because it has been a very slow progression of the disease.    Then she had clear progression of the disease in the bones with increased FDG uptake in both right and left iliac bones and the sternum.    Foundation one showed CDH1 mutation (initially lobular cancer), CCND1 amplification ( equivocal ). FGF3, FGF4, FGF19 amplification ( Equivocal ). Most promising is CCND1 amplification with abemaciclib showing response in 4/10 patients. FGF3,FGF4 and FGF19 are targetable with pan-FGFR inhibitor    As the tumor is diffusely positive for androgen receptor, we decided to start her on androgen receptor blockers.    I initially recommended enzalutamide 160 mg daily ( Enzalutamide for the Treatment of Androgen Receptor-Expressing Triple-Negative Breast Cancer----J Clin Oncol. 2018 Mar 20; 36(9): 884-890. ).    Eventually we decided to start her on Casodex 150 mg daily instead of Enzalutamide due to cost issues.  Clinical Trial Clin Cancer Res. 2013 Oct 1;19(19):5506-12.   Phase II trial of bicalutamide in patients with androgen receptor-positive, estrogen receptor-negative metastatic Breast Cancer  She started Casodex on 1/6/2022.    She had progressive disease in the bones on the PET scan on 3/31/2022.    We changed to single agent Doxil on 4/14/2022.      She developed significant nausea vomiting and headache so cycle #2 was given on 5/13/2022 with 20% dose reduction which she tolerated well.    Cycle #3 was given on 6/10/2022 with the same dose reduced Doxil.    Repeat PET/CT on 7/1/2022 shows resolution of the FDG avid bony metastasis consistent with treated disease.  No  suspicious FDG uptake was seen in the chest abdomen and pelvis.  CA 27-29 was also coming down.    CA 15-3 has been slowly rising.    Repeat PET/CT on 8/8/2023 showed pretty stable findings.    She has been having more pain in the lumbar spine/low back.    Otherwise tolerating chemotherapy well.  As the progression has been very slow and mild and overall she has been tolerating chemotherapy well, we decided to continue the same chemotherapy.    Over time CA 15-3 has continued to slowly increase but the PET scan on 11/10/2023 overall showed very stable findings.    We decided to continue Doxil     She continues to tolerate chemotherapy well.  Repeat PET/CT from 3/1/2024 showed minimally increased FDG avidity in some of the osseous metastatic lesions but there is no clear evidence of progression by CT scan.  CA 15-3 continues to slowly increase.    We discussed that overall this is consistent with very slow and mild progression of the cancer.    We discussed the situation in detail and decided to continue with the oxygen for now considering overall she feels about the same as before and tolerating Doxil well and the cancer is growing very slowly and there has been little progression clinically.     In the future we can potentially consider another chemotherapy like carboplatin or gemcitabine.    Chemotherapy-induced neutropenia.    Continue Onpro support.    Anemia.  She has mild anemia from chemotherapy.  Repeat labs tomorrow.     Bone disease.  She has metastatic disease to the bone.  Previously she got palliative radiation to T12-L5 3000 cGy in 10 fractions from 9/6/2019 till 9/18/2019.  She was evaluated by orthopedics Dr. Bipin Elliott on 11/1/2019 and conservative management was recommended.    She was on Zometa every 3 months. She last received on 9/29/2020.  Because of progression of the disease in the bones, we switched to Xgeva which she received on 11/11/2020.    She had progression of the disease in  the bones so we switched to Doxil but we continued xgeva.  PET scan on 7/1/2022 does not show any suspicious FDG uptake in the bones consistent with treated disease.  Repeat PET/CT in March 2023 was without any active FDG avid lesions.  Overall PET scan from 11/10/2023 remains pretty stable with slight decrease in FDG avidity of some lesions was a slight increase in some but overall showing pretty stable findings.  She continues to be on monthly Xgeva and she takes calcium and vitamin D.  Repeat PET/CT from 3/1/2024 showed mild increase in FDG avidity in some of the bone lesions but CT scan does not show progression in these areas.  Continue same treatment.    Cancer related pain.  She was also seen by Dr. Yusuf from pain clinic and she got bilateral SI joint steroid injections on 10/13/2023 which did not help much.  She is following with Dr. Whitaker and currently she is on buprenorphine buccal film to 600 mcg in the morning and 450 mcg in the evening which is helping with the pain.  She has to take morphine as needed only occasionally.      Nausea.  She has mild occasional nausea.  Take Zofran as needed.     Migraines.  Doing much better with Emgality.  She takes as needed Ubrelvy which helps.  She is following with neurology.      Bilateral pulmonary emboli.  Continue Eliquis.        We did not address the following today.      Shingles.  The rash has dried out.  She completed well with acyclovir and prednisone.  She takes gabapentin for postherpetic neuralgia and is doing better.      Neuropathy.  This remains mild and stable with neuropathy in her hands.    This is in addition to the chronic balance issues and heat and cold sensitivity from underlying multiple sclerosis which is also stable for years.  Continue to monitor.     Subcutaneous nodule in the scalp.  This looks like an epidermoid cyst.  She thinks it is a stable.  Continue to monitor.       Insomnia.  Continue trazodone.      Wall thickening of  esophagus and FDG uptake of fundus of the stomach.  It could be in the setting of inflammation from recent nausea and vomiting.  Symptoms have resolved.  Continue to monitor clinically.    Vision changes.    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  It was thought that this could be due to Taxol as patient reported to the ophthalmologist that she was on Taxol in September 2019 when her symptoms started.  But she was not on Taxol in September 2019 when she started noticing the visual changes so Taxol is not responsible for this.  The first dose of Taxol was on 11/5/2019.   She had left cataract extraction on 2/8/2021 and she has noticed significant improvement in her vision.  Thinks that her vision is back to her usual.      Increased FDG ability in the colon.  She had FDG uptake in the cecum and ascending colon but no corresponding lesion was seen on the CT scan.  She is completely asymptomatic so at this time we will continue to observe.    Discussion regarding healthcare directives.  On previous visit she told me that she will bring the health care directive for our records but she forgot so I told her to bring it on next visit.      Breast implant removal.  She tells me that she was planning to do breast implant removal because there was a recall on this.  Her surgery was scheduled for 9/24/2019.  Because of this new development of metastatic breast cancer and her recent radiation, surgery has been canceled.  We discussed that at this time treatment for metastatic breast cancer would take precedence over the other surgery as the other surgery would require her to be off chemotherapy for several weeks and in that case the chances of cancer progression would be high and I would not recommend that.    Multiple sclerosis.  She will continue to follow with her neurologist Dr. Quiles.  Currently multiple sclerosis is under control and currently she is not taking any  medications.    She will see nurse practitioner in 4 weeks and I will see her back in 8 weeks.    All of her questions were answered to her satisfaction.  She is agreeable and comfortable with the plan.    Dewayne Zepeda MD

## 2024-03-06 NOTE — LETTER
3/6/2024         RE: Shaneka Alvarez  [address] [facility] [address]        Dear Colleague,    Thank you for referring your patient, Shaneka Alvarez, to the [facility]. Please see a copy of my visit note below.    Virtual Visit Details    Type of service:  Video Visit     Originating Location (pt. Location): Home    Distant Location (provider location):  On-site  Platform used for Video Visit: [facility]    Palliative Care Outpatient Clinic Progress Note    Patient Name: Shaneka Alvarez is being evaluated via a billable video visit.    Primary Provider:  [facility], [facility]  Primary Oncologist: Dr Zepdea  Last seen by palliative care: myself, 1/3/24      Chief Complaint: feeling well, no complaints    Background/Summary  Medical:  - breast cancer ER+/OR+, Her2 neg diagnosed in 2006. 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 Didn't tolerate Xeloda, paclitaxel Nov 2019-Jan 2020, treated with eribulin and zometa. PD seen on PET Nov 2020 as well as a new L 4th rib concerning for a pathologic fracture. Started Trodelvy 11/11/20, PD Dec 21, switched to casodex.               - PD again on PET/CT March 2022 including b/l iliac crest lesions, R clavicle, and multifocal T spine involvement. Switched to single agent Doxil April 2022, dose decrease to 80% at 2nd cycle in light of side effects. Tolerated that well. Complete response on scan Oct 2022, apparent slow progression based on rising . Ongoing observation on single-agent Doxil, PET Nov 23 stable  - b/l PE found on PET July 2020, on anticoagulation  - MS with mild right-sided weakness    - long-standing history of migraines, follows with neurology. On monthly  aimovig with ubrelvy prn  - SI joint pain, likely largely degenerative, but patient also has metastatic disease in that area. Recently completed appointments with PT, still doing exercises at home, injection by pain clinic Oct 23 without relief. Some relief with cannabis products, NSAIDs not effective  - post-zoster neuralgia (ED Visit 2022), completely resolved         Social  She lives with her    6 adult children, 1 lives locally  Middle-, currently not working. She misses it, but also realizes that many days she wouldn't be able to go to work.      Psychospiritual  Has been through traumatic experiences in the past. Addressed these with counselors prior to her diagnosis. Doing quite well now.      Care Planning   Not discussed today. She has had a good understanding of her illness and prognosis in past conversations     POLST completed 20: DNR/selective treatments     Opioid Safety   Expected prognosis: > 1 year  Risk: Low (ORT 0)  Indication for Opioid Use: Moderate or Strong  Naloxone Script provided if patient also on benzodiazepine: yes  Indications for Tapering reviewed: rarely uses morphine      : reviewed, ok    Interim History:  At the last visit we discussed pain (chronic MSK, increased belbuca), nausea,( no changes)    Patient is on the call by herself  History gathered today from: patient, medical chart    She is feeling well, her pain is better controlled with the higher am dose of belbuca.     No other concerns or issues  Impression & Recommendations & Counseliny/o woman with metastatic breast cancer on doxil    No changes today    Pain: SI joint pain. She engaged with multimodal pain treatment plan with very limited relief. Current dose of belbuca is providing good relief.     Return to clinic in 3 months    Data / Chart Review:    Review of Systems:   ROS: 10 point ROS neg other than the symptoms noted above in the HPI and pertinents here.         Physical  Exam:   Physical Exam:  There were no vitals taken for this visit.     CONSTIT: awake, appears comfortable  EENT: MMM, EOMI, no icterus  RESP: reg, nl effort, no cough  SKIN: no rash, no obvious lesions  NEURO: alert, oriented x3  PSYCH: appropriate affect, memory and thought process intact    Current pertinent medications:  Belbuca 600mcg qAM 450mg qPM  MSIR 15-30mg q3h prn               Naloxone prescribed  acetaminophen 1000mg tid     Dexamethasone 4mg q12h     Venlafaxine 225mg daily  Lorazepam 0.5-1mg q6h prn   Trazodone 50mg HS  Buspirone 15mg bid     Olanzapine prn nausea      No pertinent allergies      Lab and imaging data reviewed:  Comments:         Alva Whitaker MD  Palliative Medicine      Again, thank you for allowing me to participate in the care of your patient.        Sincerely,        Alva Whitaker MD

## 2024-03-06 NOTE — PROGRESS NOTES
Port needle left for access for treatment. Sterile technique performed and maintained. Patient tolerated procedure well. Tubes drawn in rainbow order. Tubes labeled and signed. Transparent dressing placed with use of heparin.     Francesca Canchola RN  [facility]

## 2024-03-06 NOTE — NURSING NOTE
Is the patient currently in the state of [place]? YES    Visit mode:VIDEO    If the visit is dropped, the patient can be reconnected by: VIDEO VISIT: Text to cell phone:   Telephone Information:   Mobile [phone]       Will anyone else be joining the visit? NO  (If patient encounters technical issues they should call [phone])    How would you like to obtain your AVS? MyChart    Are changes needed to the allergy or medication list? No    Reason for visit: ANA IGLESIAS

## 2024-03-06 NOTE — PROGRESS NOTES
Infusion Nursing Note:  Shaneka Alvarez presents today for C25D1 Doxil/OnPro/Xgeva.    Patient seen by provider today: No   present during visit today: Not Applicable.    Note: Pt c/o ongoing fatigue, slight difficulty walking due to neuropathy in toes, somewhat decreased appetite and occasional nausea that is relieved with zofran, see flow sheet for assessment.  Denies any jaw concerns. Pts BP noted to be elevated, see VS flow sheet. Encouraged pt to check BP at home and notify her provider if BP continues to be elevated.      Intravenous Access:  Implanted Port.    Treatment Conditions:  Lab Results   Component Value Date    HGB 11.4 (L) 03/06/2024    WBC 9.3 03/06/2024    ANEU 22.4 (H) 07/27/2023    ANEUTAUTO 7.2 03/06/2024     03/06/2024        Lab Results   Component Value Date     03/06/2024    POTASSIUM 3.8 03/06/2024    MAG 2.0 03/12/2023    CR 0.73 03/06/2024    RAFAELA 8.9 03/06/2024    BILITOTAL 0.2 03/06/2024    ALBUMIN 3.9 03/06/2024    ALT 12 03/06/2024    AST 29 03/06/2024       Results reviewed, labs MET treatment parameters, ok to proceed with treatment.  ECHO/MUGA completed 2/2  EF 60-65%.      Post Infusion Assessment:  Patient tolerated infusion without incident.  Patient tolerated injection without incident.  Blood return noted pre and post infusion.  Site patent and intact, free from redness, edema or discomfort.  No evidence of extravasations.  Access discontinued per protocol.       Discharge Plan:   Patient discharged in stable condition accompanied by: self.  Departure Mode: Ambulatory.  Pt will RTC 4/1/24 for C26D1 Doxil/OnPro. Appts verified and pt aware.      Javier Roland RN

## 2024-03-06 NOTE — TELEPHONE ENCOUNTER
Left Voicemail (1st Attempt) and Sent Mychart (1st Attempt) for the patient to call back and schedule the following:    Appointment type: Return Headache  Provider: GUTIERREZ Larsen CNP  Return date: Around 3/1/2025  Specialty phone number: [phone]  Additional appointment(s) needed: N/A  Additional Notes: N/A    Bryce Little on 3/6/2024 at 5:37 PM

## 2024-03-08 NOTE — TELEPHONE ENCOUNTER
Patient Contacted to schedule the following:    Appointment type: Return Headache  Provider: GUTIERREZ Larsen CNP  Return date: Around 3/1/2025  Specialty phone number: [phone]  Additional appointment(s) needed: N/A  Additional Notes: N/A    Spoke with patient, scheduled on 3/5/2025.    Bryce Little on 3/8/2024 at 4:57 PM

## 2024-03-13 PROBLEM — R56.9 SEIZURE (H): Status: ACTIVE | Noted: 2024-01-01

## 2024-03-13 NOTE — ED NOTES
PT here by EMS after an episode of seizure at home,  states that he saw his wife slumped over a garbage can and was unresponsive, EMS was then called by the  to their residence. EMS reports another episode of seizure en route, versed given by EMS, with history of breast CA.

## 2024-03-13 NOTE — ED PROVIDER NOTES
"  History     Chief Complaint   Patient presents with    Seizures     HPI  Shaneka Alvarez is a 63 year old female who presents via EMS from home over concern of witnessed seizure.  Seizure activity was witnessed by her significant other.  Was given Versed by EMS and route.   is on his way.  Per EMS report, she does not have any seizure history.  Past medical history significant for breast cancer with metastases to the spine and clavicle.  Is still actively undergoing chemotherapy for breast cancer treatment.  Patient is postictal and somewhat drowsy on arrival secondary to postictal state as well as receiving Versed.  No emesis was appreciated.    When her  arrived, he states that he did not had woken up this morning.  She was still in the bedroom and he had gone to do something else in the home.  Iron a \"thud\" and went back to the bedroom.  Found Shaneka slumped partly out of the bed and over a trash can.  She had generalized shaking movements that appear to be like a seizure.  Is unsure how long this lasted.  He notes that she has never had a seizure before.  The night before she had seems to be in her normal state of health.  Had not recently been complaining of fever or chills.  No upper respiratory symptoms.  She has not been initiated on a new medication, and her last chemotherapy infusion was approximately 1 week prior.    Allergies:  Allergies   Allergen Reactions    Bactrim [Sulfamethoxazole-Trimethoprim] Other (See Comments)     Internal bleeding    Copaxone [Glatiramer] Hives       Problem List:    Patient Active Problem List    Diagnosis Date Noted    Sacroiliac joint pain 09/13/2023     Priority: Medium    Bilateral sacroiliitis (H24) 09/13/2023     Priority: Medium    Intractable pain 07/31/2023     Priority: Medium    Fall, initial encounter 07/31/2023     Priority: Medium    Left-sided low back pain with left-sided sciatica 04/05/2023     Priority: Medium    Elevated serum creatinine " 03/18/2022     Priority: Medium    Hyponatremia 03/17/2022     Priority: Medium    Vomiting 03/17/2022     Priority: Medium    Bony metastasis 03/17/2022     Priority: Medium    Acute kidney injury (H24) 03/17/2022     Priority: Medium    Sepsis without acute organ dysfunction (H)-possible  03/17/2022     Priority: Medium    Closed fracture of pedicle of lumbar vertebra, initial encounter (H) 03/17/2022     Priority: Medium    Acute midline low back pain, unspecified whether sciatica present 03/17/2022     Priority: Medium    Anemia, unspecified type 10/18/2021     Priority: Medium    Hyperphosphatemia 07/28/2021     Priority: Medium    Drug-induced polyneuropathy (H24) 02/18/2021     Priority: Medium    Cancer associated pain 02/03/2021     Priority: Medium    Posterior subcapsular polar age-related cataract of both eyes 01/29/2021     Priority: Medium     Added automatically from request for surgery [id number]      Iris synechiae, bilateral 01/29/2021     Priority: Medium     Added automatically from request for surgery [id number]      Dehydration 12/15/2020     Priority: Medium    Chemotherapy induced nausea and vomiting 12/15/2020     Priority: Medium    Sinus tachycardia 12/15/2020     Priority: Medium    Drug-induced nausea and vomiting 12/15/2020     Priority: Medium    Chemotherapy-induced neutropenia  (H24) 11/10/2020     Priority: Medium    Pulmonary embolism without acute cor pulmonale, unspecified chronicity, unspecified pulmonary embolism type (H) 08/17/2020     Priority: Medium    Hypokalemia 07/28/2020     Priority: Medium    Bone metastases 10/22/2019     Priority: Medium    Metastatic breast cancer 09/18/2019     Priority: Medium    Metastatic disease (H) 08/31/2019     Priority: Medium    S/P breast reconstruction, bilateral 07/31/2018     Priority: Medium    Anxiety and depression 10/25/2017     Priority: Medium    Hx of breast cancer 10/25/2017     Priority: Medium    Major depressive disorder,  recurrent episode, moderate (H) 04/07/2016     Priority: Medium    Anxiety 03/12/2016     Priority: Medium    Migraine without aura and without status migrainosus, not intractable 10/23/2015     Priority: Medium    Obesity, Class II, BMI 35-39.9 10/23/2015     Priority: Medium    Multiple sclerosis (H) 08/11/2015     Priority: Medium    CARDIOVASCULAR SCREENING; LDL GOAL LESS THAN 160 08/11/2015     Priority: Medium    Raynaud's syndrome 08/11/2015     Priority: Medium    Breast cancer (H) 08/11/2015     Priority: Medium     Age 44      Complication of anesthesia 08/11/2015     Priority: Medium    Arthritis 08/11/2015     Priority: Medium    Autoimmune disorder (H24) 08/11/2015     Priority: Medium    Other insomnia 08/11/2015     Priority: Medium    Medication management contract agreement 08/11/2015     Priority: Medium     Ambien 12.5 mg, dispense 30 per month, follow up every 6 months       Neurogenic bladder 12/22/2014     Priority: Medium    Vision changes 12/22/2014     Priority: Medium    Demyelinating disorder (H) 12/22/2014     Priority: Medium    Weakness 11/20/2014     Priority: Medium    GERD (gastroesophageal reflux disease) 10/22/2014     Priority: Medium    History of breast cancer 10/22/2014     Priority: Medium     Overview:   stage 3, diagnosed in 2006 s/p double mastectomy, chemo and radiation.       Migraine 10/22/2014     Priority: Medium        Past Medical History:    Past Medical History:   Diagnosis Date    Breast cancer (H)     Cataract     Chemotherapy induced nausea and vomiting 12/15/2020    Common migraine     Esophageal reflux     H/O bilateral mastectomy     Mild major depression (H24)     Multiple sclerosis (H)     Pulmonary embolism (H)        Past Surgical History:    Past Surgical History:   Procedure Laterality Date    CHOLECYSTECTOMY      ENDOBRONCHIAL ULTRASOUND FLEXIBLE N/A 09/05/2019    Procedure: Flexible Bronchoscopy, Endobronchial Ultrasound, Radial Probe;  Surgeon:  Sree Sanchez MD;  Location: [facility] OR    HC REMOVAL OF OVARY/TUBE(S)      HERNIA REPAIR, UMBILICAL      INJECT SACROILIAC JOINT Bilateral 10/13/2023    Procedure: Sacroiliac Joint Injection;  Surgeon: Natalie Yusuf MD;  Location: [facility] OR    mastectomy  Bilateral 2006    MASTECTOMY, BILATERAL      PHACOEMULSIFICATION CLEAR CORNEA WITH TORIC INTRAOCULAR LENS IMPLANT Left 2021    Procedure: PHACOEMULSIFICATION, COMPLEX CATARACT, WITH INTRAOCULAR LENS IMPLANT, RESIDENT TORIC LENS LEFT;  Surgeon: Luis Barkley MD;  Location: [facility] OR    PHACOEMULSIFICATION CLEAR CORNEA WITH TORIC INTRAOCULAR LENS IMPLANT Right 3/8/2021    Procedure: PHACOEMULSIFICATION, CATARACT, WITH INTRAOCULAR LENS IMPLANT, TORIC LENS RIGHT;  Surgeon: Luis Barkley MD;  [place] [facility] [place]       Family History:    Family History   Problem Relation Age of Onset    Breast Cancer Maternal Aunt 50         at 85    Breast Cancer Cousin 40    Breast Cancer Mother 49        2nd primary, contralateral breast at 69;  at 86    Melanoma Mother     Breast Cancer Maternal Grandmother 40    Ovarian Cancer Maternal Grandmother     Breast Cancer Paternal Grandmother 50         at 60    Brain Cancer Cousin 20    Breast Cancer Paternal Aunt 50         at 60    Breast Cancer Cousin 45        paternal cousin    Anesthesia Reaction No family hx of     Deep Vein Thrombosis No family hx of        Social History:  Marital Status:   [2]  Social History     Tobacco Use    Smoking status: Former     Types: Cigarettes     Quit date: 2005     Years since quittin.2     Passive exposure: Never (per pt)    Smokeless tobacco: Never   Vaping Use    Vaping Use: Never used   Substance Use Topics    Alcohol use: Not Currently     Alcohol/week: 2.0 standard drinks of alcohol     Types: 1 Glasses of wine, 1 Cans of beer per week    Drug use: No        Medications:    apixaban ANTICOAGULANT (ELIQUIS ANTICOAGULANT) 5 MG  tablet  Buprenorphine HCl (BELBUCA) 450 MCG FILM buccal film  Buprenorphine HCl (BELBUCA) 600 MCG FILM buccal film  busPIRone (BUSPAR) 15 MG tablet  calcium citrate-vitamin D (CITRACAL) 315-250 MG-UNIT TABS  Cholecalciferol (VITAMIN D3 PO)  doxepin (SINEQUAN) 10 MG/ML (HIGH CONC) solution  galcanezumab-gnlm (EMGALITY) 120 MG/ML injection  LORazepam (ATIVAN) 0.5 MG tablet  metoclopramide (REGLAN) 5 MG tablet  morphine (MSIR) 15 MG IR tablet  OLANZapine (ZYPREXA) 5 MG tablet  ondansetron (ZOFRAN ODT) 4 MG ODT tab  propranolol ER (INDERAL LA) 80 MG 24 hr capsule  ubrogepant (UBRELVY) 100 MG tablet  venlafaxine (EFFEXOR XR) 75 MG 24 hr capsule  vitamin B-2 (RIBOFLAVIN) 25 MG TABS tablet  ZOLMitriptan (ZOMIG) 5 MG nasal spray          Review of Systems   Unable to perform ROS: Other (Patient sedated)       Physical Exam   BP: 91/66  Pulse: 96  Temp: 97.8  F (36.6  C)  Resp: 16  SpO2: 97 %      Physical Exam  Vitals and nursing note reviewed.   HENT:      Head: Normocephalic and atraumatic.     Eyes:      Pupils: Pupils are equal, round, and reactive to light.   Cardiovascular:      Rate and Rhythm: Normal rate.   Pulmonary:      Effort: Pulmonary effort is normal.      Breath sounds: Normal breath sounds.   Abdominal:      General: Bowel sounds are normal.      Palpations: Abdomen is soft.   Skin:     General: Skin is warm and dry.   Neurological:      Comments: Somnolent. Responds to sternal rub and withdraws from pain during IV insertion         ED Course        Procedures              EKG Interpretation:      Interpreted by Arlette Keller DO  Time reviewed: 1105  Symptoms at time of EKG: New onset seizure   Rhythm: normal sinus   Rate: Normal  Axis: Left Axis Deviation  Ectopy: none  Conduction: Short DE  ST Segments/ T Waves: No acute ischemic changes  Q Waves: none  Comparison to prior: Unchanged    Clinical Impression: no acute changes, nonspecific EKG            Critical Care time:  was 60 minutes for this  patient excluding procedures.               Results for orders placed or performed during the hospital encounter of 03/13/24 (from the past 24 hour(s))   CBC with platelets differential    Narrative    The following orders were created for panel order CBC with platelets differential.  Procedure                               Abnormality         Status                     ---------                               -----------         ------                     CBC with platelets and d...[[id number]]  Abnormal            Final result               RBC and Platelet Morphology[[id number]]                      Final result                 Please view results for these tests on the individual orders.   Comprehensive metabolic panel   Result Value Ref Range    Sodium 137 135 - 145 mmol/L    Potassium 3.1 (L) 3.4 - 5.3 mmol/L    Carbon Dioxide (CO2) 24 22 - 29 mmol/L    Anion Gap 22 (H) 7 - 15 mmol/L    Urea Nitrogen 10.8 8.0 - 23.0 mg/dL    Creatinine 0.83 0.51 - 0.95 mg/dL    GFR Estimate 80 >60 mL/min/1.73m2    Calcium 9.8 8.8 - 10.2 mg/dL    Chloride 91 (L) 98 - 107 mmol/L    Glucose 168 (H) 70 - 99 mg/dL    Alkaline Phosphatase 225 (H) 40 - 150 U/L    AST 31 0 - 45 U/L    ALT 12 0 - 50 U/L    Protein Total 7.0 6.4 - 8.3 g/dL    Albumin 4.1 3.5 - 5.2 g/dL    Bilirubin Total 0.5 <=1.2 mg/dL   Magnesium   Result Value Ref Range    Magnesium 1.8 1.7 - 2.3 mg/dL   Blood gas venous   Result Value Ref Range    pH Venous 7.39 7.32 - 7.43    pCO2 Venous 48 40 - 50 mm Hg    pO2 Venous 38 25 - 47 mm Hg    Bicarbonate Venous 29 (H) 21 - 28 mmol/L    Base Excess/Deficit Venous 3.3 (H) -3.0 - 3.0 mmol/L    FIO2 21     Oxyhemoglobin Venous 63 (L) 70 - 75 %    O2 Sat, Venous 63.7 (L) 70.0 - 75.0 %    Narrative    In healthy individuals, oxyhemoglobin (O2Hb) and oxygen saturation (SO2) are approximately equal. In the presence of dyshemoglobins, oxyhemoglobin can be considerably lower than oxygen saturation.   CBC with platelets and  differential   Result Value Ref Range    WBC Count 41.5 (H) 4.0 - 11.0 10e3/uL    RBC Count 4.56 3.80 - 5.20 10e6/uL    Hemoglobin 12.6 11.7 - 15.7 g/dL    Hematocrit 38.2 35.0 - 47.0 %    MCV 84 78 - 100 fL    MCH 27.6 26.5 - 33.0 pg    MCHC 33.0 31.5 - 36.5 g/dL    RDW 15.9 (H) 10.0 - 15.0 %    Platelet Count 197 150 - 450 10e3/uL    % Neutrophils 93 %    % Lymphocytes 1 %    % Monocytes 3 %    % Eosinophils 0 %    % Basophils 0 %    % Immature Granulocytes 4 %    NRBCs per 100 WBC 0 <1 /100    Absolute Neutrophils 38.4 (H) 1.6 - 8.3 10e3/uL    Absolute Lymphocytes 0.3 0.0 - 5.3 10e3/uL    Absolute Monocytes 1.2 0.0 - 1.3 10e3/uL    Absolute Eosinophils 0.0 0.0 - 0.7 10e3/uL    Absolute Basophils 0.0 0.0 - 0.2 10e3/uL    Absolute Immature Granulocytes 1.6 (H) <=0.4 10e3/uL    Absolute NRBCs 0.0 10e3/uL   RBC and Platelet Morphology   Result Value Ref Range    RBC Morphology Confirmed RBC Indices     Platelet Assessment  Automated Count Confirmed. Platelet morphology is normal.     Automated Count Confirmed. Platelet morphology is normal.    Giant Platelets      Acanthocytes      Padmini Rods      Basophilic Stippling      Bite Cells      Blister Cells      C[place]mere Cells      Elliptocytes      Hgb C Crystals      Mittal-Jolly Bodies      Hypersegmented Neutrophils      Polychromasia      RBC agglutination      RBC Fragments      Reactive Lymphocytes      Rouleaux      Sickle Cells      Smudge Cells      Spherocytes      Stomatocytes      Target Cells      Teardrop Cells      Toxic Neutrophils      Pathologist Review Comments (Blood)     CT Head w/o Contrast   Result Value Ref Range    Radiologist flags Acute intracranial hemorrhage (AA)     Narrative    CT SCAN OF THE HEAD WITHOUT CONTRAST   3/13/2024 9:37 AM     HISTORY: Seizure, breast cancer with known metastases to bone.    TECHNIQUE:  Axial images of the head and coronal reformations without  IV contrast material. Radiation dose for this scan was reduced  using  automated exposure control, adjustment of the mA and/or kV according  to patient size, or iterative reconstruction technique.    COMPARISON: Head CT 7/31/2023    FINDINGS: Small (about 1 cm or less) areas of interparenchymal  hyperattenuation involving the left stratum/internal capsule and left  thalamus measuring less than 1 cm, new compared to prior. Small area  of cortical/subarachnoid hyperattenuation involving the right  occipital lobe, new compared to the prior. No high-grade mass effect  or hydrocephalus. Background of volume loss and nonspecific patchy  white matter hypoattenuation. Old basal ganglia lacunar infarcts.    No acute osseous abnormality. Moderate right maxillary sinus mucosal  thickening with aerated secretions. Cystic lesion within the posterior  scalp, presumably epidermal inclusion or sebaceous cyst.      Impression    IMPRESSION:   1. Small areas of hyperattenuation involving left stratum/centrum  semiovale and left thalamus, concerning for hemorrhages (hemorrhagic  metastases not excluded), new compared to the prior.  2. Small area of cortical/subarachnoid hyperattenuation involving the  right occipital lobe, likely cortical/subarachnoid hemorrhage  (hemorrhagic metastasis not excluded), new compared to the prior.    [Critical Result: Acute intracranial hemorrhage]    Finding was identified on 3/13/2024 9:56 AM.     Dr. Keller was contacted by me on 3/13/2024 10:04 AM and verbalized  understanding of the critical result.      THIERNO MI MD         SYSTEM ID:  [id number]   XR Chest Port 1 View    Narrative    CHEST PORTABLE 1 VIEW   3/13/2024 9:42 AM     HISTORY: Seizure.    COMPARISON: Chest x-ray 7/27/2023.      Impression    IMPRESSION: New irregular lobulated nodule opacification at the left  lung base is indeterminant. Pneumonia could have this appearance.  Neoplasm remains in the differential. This can be further evaluated  with CT. Left chest port appears stable. Stable  subacute rib  fractures. Normal cardiac silhouette. Scattered surgical clips noted.    CARMITA TRACY MD         SYSTEM ID:  [id number]     *Note: Due to a large number of results and/or encounters for the requested time period, some results have not been displayed. A complete set of results can be found in Results Review.       Medications   lidocaine 1 % 0.1-1 mL (has no administration in time range)   lidocaine (LMX4) cream (has no administration in time range)   sodium chloride (PF) 0.9% PF flush 3 mL (3 mLs Intracatheter Not Given 3/13/24 1051)   sodium chloride (PF) 0.9% PF flush 3 mL (has no administration in time range)   LORazepam (ATIVAN) injection 4 mg (has no administration in time range)   sodium chloride 0.9% BOLUS 1,000 mL (0 mLs Intravenous Stopped 3/13/24 1240)   LORazepam (ATIVAN) injection 0.5 mg (0.5 mg Intravenous $Given 3/13/24 1051)   levETIRAcetam (KEPPRA) 1,000 mg in sodium chloride 0.9 % 100 mL intermittent infusion (0 mg Intravenous Stopped 3/13/24 1319)       Assessments & Plan (with Medical Decision Making)  Shaneka is a 63-year-old female with past medical history significant for breast cancer with metastases to spine, currently undergoing chemotherapy, presenting via EMS from home after new onset seizure witnessed by her .  See history and physical exam as above  63-year-old female who is in no acute distress, is vitally stable and afebrile.  She is somnolent but responsive to painful stimuli on initial exam.  No evidence of acute traumatic injury is appreciated.  Low onset seizure in setting of metastatic cancer is concerning for metastases to brain.  Will plan to get a head CT and will check lab work and EKG, and will monitor for any further seizure activity.  Labs and imaging as above.  Received call from radiology regarding critical result of his subarachnoid hemorrhage and hemorrhagic metastases present.  However, on reassessment, patient was now alert and awake.  She was  responding to questions though she still seemed mildly confused about the chain of events that led to current ED visit.  Her  is at the bedside and was able to answer further questions in regards to presenting symptoms.  Both Shaneka and her  were updated on the acute emergent findings.  She is anticoagulated on Eliquis due to history of PE, so we will need to consult with neurosurgery about the noted intracranial hemorrhage.  There is no evidence of acute mass effect or sign of large hemorrhage at this time, but may need to consider anticoagulation reversal.  Will also plan to transfer Shaneka to higher level of care, as she will need neurosurgery available if bleed does become larger and would require intervention, think she would also benefit from neurology consultation and having oncology in-house to consult on most appropriate next steps of her management for metastatic breast cancer.  The services are not available locally and she will require transfer.  Her  is agreeable to this plan  1145 Spoke with SHREYAS Ruth with neurosurgery team on-call and reviewed to the new findings on head CT.  They do not recommend any immediate reversal of anticoagulation due to the small size of the hemorrhage that is noted.  Especially since patient is awake, alert, and neurologically intact.  Would recommend a repeat CT scan in 6 hours if the patient is still in the department.  Also do not think that it would be unreasonable to load with Keppra, as no additional medication has been given for her seizures, though she has not demonstrated any further seizure activity in the ED.  Will plan to load with Keppra and transferred to higher level of care and admit to hospital service.  Can consult neurosurgery for further recommendations when she arrives  Bed became available at [facility].  Spoke with hospitalist, who accepted the patient in transfer.  However, since there was a delay in appropriate available  bed and transfer of patient out of the department, she was still present at 6 hours after the initial head CT had been obtained.  Repeat CT scan was ordered, and did not notice any acute enlargement of the intracranial hemorrhage or any sign of new hemorrhage.  She did remain awake and alert without any focal neurologic deficit or further seizure activity.  Left the department in stable condition.         I have reviewed the nursing notes.    I have reviewed the findings, diagnosis, plan and need for follow up with the patient.       Critical Care Addendum  My initial assessment, based on my review of prehospital provider report, review of nursing observations, review of vital signs, focused history, physical exam, 12 lead ECG analysis, and discussion with neurosurgery , established a high suspicion that Shaneka Alvarez has altered mental status and cranial hemorrhage , which requires immediate intervention, and therefore she is critically ill.     After the initial assessment, the care team initiated multiple lab tests, initiated IV fluid administration, initiated medication therapy with Ativan, Keppra, and consulted with neurosurgery  to provide stabilization care. Due to the critical nature of this patient, I reassessed vital signs, physical exam, mental status, neurologic status, and respiratory status multiple times prior to her disposition.     Time also spent performing documentation, discussion with family to obtain medical information for decision making, reviewing test results, discussion with consultants, and coordination of care.     Critical care time (excluding teaching time and procedures): 60 minutes.     ED to Inpatient Handoff:    Discussed with Dr. Chavarria at 1245  Patient accepted for Inpatient Stay  Pending studies include  N/A  Code Status: Not Addressed             Medical Decision Making  The patient's presentation was of high complexity (a chronic illness severe exacerbation, progression,  or side effect of treatment).    The patient's evaluation involved:  an assessment requiring an independent historian ()  ordering and/or review of 3+ test(s) in this encounter (see separate area of note for details)  discussion of management or test interpretation with another health professional (see separate area of note for details)    The patient's management necessitated high risk (a decision regarding hospitalization).        New Prescriptions    No medications on file       Final diagnoses:   Subarachnoid hemorrhage (H)   Breast cancer metastasized to brain, unspecified laterality (H)   New onset seizure (H)       3/13/2024   [facility] EMERGENCY DEPT       Arlette Keller DO  03/17/24 [id number]

## 2024-03-13 NOTE — MEDICATION SCRIBE - ADMISSION MEDICATION HISTORY
Medication Scribe Admission Medication History    Admission medication history is complete. The information provided in this note is only as accurate as the sources available at the time of the update.    Information Source(s): Patient, Family member, and CareEverywhere/SureScripts via in-person and phone    Pertinent Information: Kash (spouse) phone [phone] said patient handles her own meds. Patient unable to remember when she took them last. Patient's words are scrambled.    Changes made to PTA medication list:  Added: None  Deleted: None  Changed: None    Allergies reviewed with patient and updates made in EHR: unable to assess    Medication History Completed By: ALVARO GARRISON 3/13/2024 2:15 PM    No outpatient medications have been marked as taking for the 3/13/24 encounter (Hospital Encounter).

## 2024-03-13 NOTE — PROGRESS NOTES
Ms. Alvarez is a 63-year-old female, history of breast cancer, with known osseous metastases, including skull metastases.  ER contacted neurosurgery regarding a witnessed seizure by EMS and the patient's  today, CT scan demonstrated subarachnoid hemorrhage and also possible hemorrhagic metastases.  No mass effect.  ER reports patient exam is stable and improving in the emergency room, neurologically intact.  She is on Eliquis, for history of PE, last dose was likely yesterday.    Plan    Hold Eliquis, no reversal agents needed at this time  Repeat head CT scan in 6 hours  Load patient with Keppra, then continue every 12 hours, further management of seizure per neurology    Working on transfer to higher level of care, where a brain MRI could be performed, and further evaluation by oncology and neurology.

## 2024-03-13 NOTE — ED TRIAGE NOTES
Brought in VIA EMS with seizure activity. EMS reports seizure at home and  and additional seizure prior to arrival. Pt was given versed and is currently dosing with seizure pads in place. Hx of breast cancer with mets to spine and right clavicle     Triage Assessment (Adult)       Row Name 03/13/24 0809          Respiratory WDL    Respiratory WDL WDL        Cardiac WDL    Cardiac WDL WDL     Cardiac Rhythm NSR        Peripheral/Neurovascular WDL    Peripheral Neurovascular WDL WDL        Cognitive/Neuro/Behavioral WDL    Cognitive/Neuro/Behavioral WDL level of consciousness     Level of Consciousness somnolent

## 2024-03-14 NOTE — TELEPHONE ENCOUNTER
Insurance is not showing as active for patient.     Called [facility], they also said her insurance is showing not active.     Called patient at [phone],  let me know that he has the same insurance that is scanned in today through [employer].   He had to fill out the paperwork and sent this in just yesterday. It may take a up to a week for this to become active again.     Will postpone the three PA encounters until next week and will resubmit with the same insurance information.

## 2024-03-14 NOTE — PROGRESS NOTES
Reason for Admission: seizure, SAH  Cognitive/Mentation: A/Ox 4  Neuros/CMS: Intact   VS: persistent fever, Tylenol x1. Right sided limb alert  GI: Last BM 3/12 ,Continent.  : Continent.  Pulmonary: LS clear.  Pain: generalized, managed with repositioning     Drains/Lines: L chest portacath-Saline locked  Skin: intact  Activity: Assist SBA  Diet: Regular with thin liquids- poor intake. Ensure supplement ordered between meals. Takes pills whole.     Therapies recs: pending- nothing ordered yet  Discharge: home pending    Aggression Stoplight Tool: green    End of shift summary: awaiting Urine specimen collection-hat and container in bathroom.

## 2024-03-14 NOTE — PROGRESS NOTES
[facility]    Medicine Progress Note - Hospitalist Service        Date of Admission:  3/13/2024  7:20 PM    Assessment & Plan:   Shaneka Alvarez is a 63 year old female with past medical history significant for metastatic breast cancer, MS, prior pulmonary embolism on anticoagulation admitted on 3/13/2024 with witnessed seizure activity.      New onset seizure   Trace right parietal and occipital subarachnoid hemorrhage  -Pt presented to outside ED after a witnessed seizure.   -CT scan of the head was obtained and showed small areas of hypoattenuation involving the left striatum/centrum semiovale and left thalamus concerning for hemorrhages (hemorrhagic metastases not excluded).   -MRI brain today shows trace right parietal-occipital subarachnoid hemorrhage, known osseous metastasis, no intracranial/parenchymal metastasis  * Neurosurgery was contacted in the ED and recommended transfer to [facility] for higher level of care.   * Pt loaded with 1g of keppra   -General neurology, neurosurgery and oncology consulted  -Continue Keppra  -Seizure precaution  -Q2H neurochecks   -Hold PTA eliquis      Metastatic Breast Cancer   Follows with [facility]ology. Currently receiving chemotherapy with Doxil and Onpro (neulasta).   -Oncology consulted      Leukocytosis  WBC count is markedly elevated to 41.5 on admission (WBC count was 9.3 on 3/6/24). Perhaps 2/2 Neulasta.   -Repeat CBC now  -Patient with a temperature of 100.8, will get basic workup including blood culture, UA and chest x-ray     Hx of PE  -Hold PTA eliquis given SAH above     Mild hypokalemia  -Replace per protocol     Diet:  regular   DVT Prophylaxis: Pneumatic Compression Devices   Huntley Catheter: Not present  Code Status: Full Code     Disposition Plan       Expected Discharge Date: 03/15/2024              Entered: Sylvester Oswald MD 03/14/2024, 10:01 AM        Clinically Significant Risk Factors Present on Admission        # Hypokalemia:  "Lowest K = 3.1 mmol/L in last 2 days, will replace as needed      # Anion Gap Metabolic Acidosis: Highest Anion Gap = 22 mmol/L in last 2 days, will monitor and treat as appropriate   # Drug Induced Coagulation Defect: home medication list includes an anticoagulant medication                         The patient's care was discussed with the Bedside Nurse, Patient, and Patient's Family.    Medical Decision Making       **CLEAR ALL SELECTIONS**      Labs/Imaging Reviewed:  See Information above and Data section below    Time SPENT BY ME on the date of service doing chart review, history, exam, documentation & further activities per the note:  52 MINUTES    Chart documentation was completed, in part, with [employer] voice-recognition software. Even though reviewed, some grammatical, spelling, and word errors may remain.    Sylvester Oswald MD  Hospitalist Service  [facility]  Text Page 7AM-6PM  Securely message with the Vocera Web Console (learn more here)  Text page via [employer] Paging/Directory    ______________________________________________________________________    Interval History   No further seizure activity.  Denies headache, focal tingling or numbness or weakness.  Low-grade fever of 100.8.  No abdominal pain, nausea or vomiting.  No cough.  No dysuria.      Data reviewed today: I reviewed all medications, new labs and imaging results over the last 24 hours. I personally reviewed no images or EKG's today.    Physical Exam   Vital signs:  Temp: 99.4  F (37.4  C) Temp src: Oral BP: (!) 124/90 Pulse: 84   Resp: 16 SpO2: 99 % O2 Device: None (Room air)        Estimated body mass index is 20.85 kg/m  as calculated from the following:    Height as of 3/5/24: 1.575 m (5' 2\").    Weight as of 3/6/24: 51.7 kg (114 lb).      Wt Readings from Last 2 Encounters:   03/06/24 51.7 kg (114 lb)   03/05/24 49.9 kg (110 lb)       Gen: AAOX3, NAD, comfortable  HEENT: Supple neck, moist oral mucosa, no " pallor  Resp: CTA B/L, normal WOB, no crackles, no wheezes  CVS: RRR, no murmur  Abd/GI: Soft, non-tender. BS- normoactive.  No G/R/R  Skin: Warm, dry no rashes  MSK: No joint deformities, no pedal edema  Neuro- CN- intact. No focal deficits.      Data   Recent Labs   Lab 03/13/24  0856   WBC 41.5*   HGB 12.6   MCV 84         POTASSIUM 3.1*   CHLORIDE 91*   CO2 24   BUN 10.8   CR 0.83   ANIONGAP 22*   RAFAELA 9.8   *   ALBUMIN 4.1   PROTTOTAL 7.0   BILITOTAL 0.5   ALKPHOS 225*   ALT 12   AST 31       Recent Results (from the past 24 hour(s))   CT Head w/o Contrast    Narrative    EXAM: CT HEAD W/O CONTRAST  LOCATION: [facility]  DATE: 3/13/2024    INDICATION: Subarachnoid hemorrhage, hemorrhagic metastases  COMPARISON: Arch 13 2024.  TECHNIQUE: Routine CT Head without IV contrast. Multiplanar reformats. Dose reduction techniques were used.    FINDINGS:  INTRACRANIAL CONTENTS: Unchanged subarachnoid hemorrhage in the right occipital lobe. Unchanged nonspecific area of hyperattenuation in the left centrum semiovale extending towards the lentiform nucleus. No CT evidence of acute infarct. Mild presumed   chronic small vessel ischemic changes. Normal ventricles and sulci.     VISUALIZED ORBITS/SINUSES/MASTOIDS: Prior bilateral cataract surgery. Visualized portions of the orbits are otherwise unremarkable. Severe right maxillary sinus disease. No middle ear or mastoid effusion.    BONES/SOFT TISSUES: Probable sebaceous cyst overlying the midline occipital bone.      Impression    IMPRESSION:  1.  Unchanged subarachnoid hemorrhage in the right occipital lobe.  2.  Unchanged nonspecific hyperattenuation in the left centrum semiovale extending towards the lentiform nucleus.  3.  Chronic changes as above.   MR Brain w/o & w Contrast    Narrative    EXAM: MR BRAIN W/O and W CONTRAST  LOCATION: [facility]  DATE: 3/13/2024    INDICATION: Eval of possible  intracranial mets, subarachnoid hemorrhage  COMPARISON:  Same day CT head, MRI brain 07/06/2023, PET/CT 8/8/2023 and 3/1/2024.  CONTRAST: 5 mL Gadavist  TECHNIQUE: Routine multiplanar multisequence head MRI without and with intravenous contrast.    FINDINGS:  INTRACRANIAL CONTENTS: No acute or subacute infarct.  Trace right parietal and occipital subarachnoid hemorrhage. No mass effect or midline shift.  Similar appearance of white matter lesions compared to 07/06/2021 in a patient with a history of multiple   sclerosis. Mild generalized cerebral atrophy. No hydrocephalus. Normal position of the cerebellar tonsils. No pathologic contrast enhancement.    SELLA: No abnormality accounting for technique.    OSSEOUS STRUCTURES/SOFT TISSUES: Diffusely heterogeneous in a patient with osseous metastases. The major intracranial vascular flow voids are maintained.     ORBITS: Prior bilateral cataract surgery. Visualized portions of the orbits are otherwise unremarkable.     SINUSES/MASTOIDS:  Paranasal sinus mucosal thickening in a right ostiomeatal unit pattern of obstruction. No middle ear or mastoid effusion.       Impression    IMPRESSION:  1.  Similar trace right parieto-occipital subarachnoid hemorrhage.  2.  Known osseous metastases. No intracranial/parenchymal metastasis.     Medications    - MEDICATION INSTRUCTIONS -        levETIRAcetam  500 mg Intravenous Q12H    sodium chloride (PF)  3 mL Intracatheter Q8H

## 2024-03-14 NOTE — CONSULTS
Neurology Consult Note  [facility]       [2024]                                                                                       Admission Date: 3/13/2024  Hospital Day: 2      Patient: Shaneka Alvarez      : 1960  MRN:  [id number]     CC:    No chief complaint on file.      Consult Request:  Referring Provider:  Myesha Zepeda MD  Primary Care Provider:  Clinic, [place] MD        HPI:  Shaneka Alvarez is a 63 year old yo female admitted for recent seizure witnessed by her  at home followed by a second seizure witnessed by EMTs.    Patient with a known history of breast cancer with osseous metastasis in the skull, multiple sclerosis, DVTs on anticoagulation, suddenly found to have a generalized tonic-clonic seizure per .  He called 911, and the patient had a second seizure while with the EMTs.  Patient has no history of seizures in the past.  Has been on anticoagulation for DVT management.  Has a history of breast cancer with metastases in the periosteal bones.    While in the hospital, patient has been stable, slightly more somnolent than usual.  Imaging has shown trace right parietal occipital subarachnoid hemorrhage, along with revisualization of Previously known osseous metastases.    In the emergency room, patient was loaded with 1 g of Keppra, followed by continuation of 500 mg twice daily of Keppra.    PAST MEDICAL HISTORY:  ALLERGIES:   Allergies   Allergen Reactions    Bactrim [Sulfamethoxazole-Trimethoprim] Other (See Comments)     Internal bleeding    Copaxone [Glatiramer] Hives     Tobacco:    History   Smoking Status    Former    Types: Cigarettes    Quit date: 2005   Smokeless Tobacco    Never     Alcohol:  Social History    Substance and Sexual Activity      Alcohol use: Not Currently        Alcohol/week: 2.0 standard drinks of alcohol        Types: 1 Glasses of wine, 1 Cans of beer per week    MEDICATIONS:        CURRENTLY SCHEDULED MEDICATIONS    levETIRAcetam  500 mg Intravenous Q12H    sodium chloride (PF)  3 mL Intracatheter Q8H          HOME MEDICATIONS  Medications Prior to Admission   Medication Sig Dispense Refill Last Dose    apixaban ANTICOAGULANT (ELIQUIS ANTICOAGULANT) 5 MG tablet Take 1 tablet (5 mg) by mouth 2 times daily 60 tablet 11     Buprenorphine HCl (BELBUCA) 450 MCG FILM buccal film Place 1 Film (450 mcg) inside cheek every evening Place the 600mcg film every morning 30 Film 0     Buprenorphine HCl (BELBUCA) 600 MCG FILM buccal film Place 1 Film (600 mcg) inside cheek every morning Continue taking the 450mcg film in the evening 30 Film 0     busPIRone (BUSPAR) 15 MG tablet Take 1 tablet (15 mg) by mouth 2 times daily 180 tablet 3     calcium citrate-vitamin D (CITRACAL) 315-250 MG-UNIT TABS Take 1 tablet by mouth every morning        Cholecalciferol (VITAMIN D3 PO) Take 2,000 Units by mouth every morning        doxepin (SINEQUAN) 10 MG/ML (HIGH CONC) solution Take 0.3 mLs (3 mg) by mouth at bedtime 120 mL 0     galcanezumab-gnlm (EMGALITY) 120 MG/ML injection Inject 1 mL (120 mg) Subcutaneous every 28 days 1 mL 11     LORazepam (ATIVAN) 0.5 MG tablet Take 1-2 tablets (0.5-1 mg) by mouth every 6 hours as needed for anxiety 6 tablet 0     metoclopramide (REGLAN) 5 MG tablet Take 1-2 tablets (5-10 mg) by mouth every 6 hours as needed (nausea/vomiting/migraine) 20 tablet 5     morphine (MSIR) 15 MG IR tablet Take 1-2 tablets (15-30 mg) by mouth every 3 hours as needed for pain 60 tablet 0     OLANZapine (ZYPREXA) 5 MG tablet Take 2.5-5 mg by mouth 2 times daily as needed for nausea       ondansetron (ZOFRAN ODT) 4 MG ODT tab Take 1 tablet (4 mg) by mouth every 8 hours as needed for nausea 10 tablet 0     propranolol ER (INDERAL LA) 80 MG 24 hr capsule 80 mg every morning and 160 at night 270 capsule 3     ubrogepant (UBRELVY) 100 MG tablet Take 1 tablet (100 mg) by mouth at onset of headache (may repeat in  2 hours as needed. Max 200 mg in 24 hours.) 16 tablet 11     venlafaxine (EFFEXOR XR) 75 MG 24 hr capsule Take 3 capsules (225 mg) by mouth daily 270 capsule 3     vitamin B-2 (RIBOFLAVIN) 25 MG TABS tablet Take 1 tablet by mouth daily       ZOLMitriptan (ZOMIG) 5 MG nasal spray Spray 1 spray in nostril at onset of headache for migraine May repeat in 2 hours. Max 2 sprays/24 hours. 12 each 9      MEDICAL HISTORY  Past Medical History:   Diagnosis Date    Breast cancer (H)     Age 44    Cataract     left eye    Chemotherapy induced nausea and vomiting 12/15/2020    Common migraine     Esophageal reflux     H/O bilateral mastectomy     Mild major depression (H24)     Multiple sclerosis (H)     Pulmonary embolism (H)      SURGICAL HISTORY  Past Surgical History:   Procedure Laterality Date    CHOLECYSTECTOMY      ENDOBRONCHIAL ULTRASOUND FLEXIBLE N/A 2019    Procedure: Flexible Bronchoscopy, Endobronchial Ultrasound, Radial Probe;  Surgeon: Sree Sanchez MD;  Location: [facility] OR    HC REMOVAL OF OVARY/TUBE(S)      HERNIA REPAIR, UMBILICAL      INJECT SACROILIAC JOINT Bilateral 10/13/2023    Procedure: Sacroiliac Joint Injection;  Surgeon: Natalie Yusuf MD;  Location: [facility] OR    mastectomy  Bilateral 2006    MASTECTOMY, BILATERAL      PHACOEMULSIFICATION CLEAR CORNEA WITH TORIC INTRAOCULAR LENS IMPLANT Left 2021    Procedure: PHACOEMULSIFICATION, COMPLEX CATARACT, WITH INTRAOCULAR LENS IMPLANT, RESIDENT TORIC LENS LEFT;  Surgeon: Luis Barkley MD;  Location: [facility] OR    PHACOEMULSIFICATION CLEAR CORNEA WITH TORIC INTRAOCULAR LENS IMPLANT Right 3/8/2021    Procedure: PHACOEMULSIFICATION, CATARACT, WITH INTRAOCULAR LENS IMPLANT, TORIC LENS RIGHT;  Surgeon: Luis Barkley MD;  Location: [facility] OR     FAMILY HISTORY    Family History   Problem Relation Age of Onset    Breast Cancer Maternal Aunt 50         at 85    Breast Cancer Cousin 40    Breast Cancer Mother 49        2nd primary,  contralateral breast at 69;  at 86    Melanoma Mother     Breast Cancer Maternal Grandmother 40    Ovarian Cancer Maternal Grandmother     Breast Cancer Paternal Grandmother 50         at 60    Brain Cancer Cousin 20    Breast Cancer Paternal Aunt 50         at 60    Breast Cancer Cousin 45        paternal cousin    Anesthesia Reaction No family hx of     Deep Vein Thrombosis No family hx of      SOCIAL HISTORY  Social History     Socioeconomic History    Marital status:      Spouse name: Kash    Number of children: 3   Occupational History     Employer: [employer]   Tobacco Use    Smoking status: Former     Types: Cigarettes     Quit date: 2005     Years since quittin.2     Passive exposure: Never (per pt)    Smokeless tobacco: Never   Vaping Use    Vaping Use: Never used   Substance and Sexual Activity    Alcohol use: Not Currently     Alcohol/week: 2.0 standard drinks of alcohol     Types: 1 Glasses of wine, 1 Cans of beer per week    Drug use: No    Sexual activity: Yes     Partners: Male     Birth control/protection: None     Comment: not needed after chemo     Social Determinants of Health     Financial Resource Strain: High Risk (2024)    Financial Resource Strain     Within the past 12 months, have you or your family members you live with been unable to get utilities (heat, electricity) when it was really needed?: Yes   Food Insecurity: Low Risk  (2024)    Food Insecurity     Within the past 12 months, did you worry that your food would run out before you got money to buy more?: No     Within the past 12 months, did the food you bought just not last and you didn t have money to get more?: No   Transportation Needs: Low Risk  (2024)    Transportation Needs     Within the past 12 months, has lack of transportation kept you from medical appointments, getting your medicines, non-medical meetings or appointments, work, or from getting things that you need?: No  "  Housing Stability: Low Risk  (2024)    Housing Stability     Do you have housing? : Yes     Are you worried about losing your housing?: No                  Temp: (!) 100.8  F (38.2  C)        Temp src: Oral         BP: 118/85         Estimated body mass index is 20.85 kg/m  as calculated from the following:    Height as of 3/5/24: 1.575 m (5' 2\").    Weight as of 3/6/24: 51.7 kg (114 lb).    Resp: 16   SpO2: 99 %   O2 Device: None (Room air)     Blood Pressure:   BP Readings from Last 3 Encounters:   24 118/85   24 128/88   24 (!) 166/114     T24 : Temp (24hrs), Av.2  F (37.3  C), Min:98  F (36.7  C), Max:100.8  F (38.2  C)       GENERAL EXAMINATION:    NEUROLOGICAL EXAMINATION:    Level of Consciousness:  Normal.    Orientation:  Alert and oriented to time, place and person.    Memory:  Intact recent and remote memory.    Attention span and Concentration:  Unremarkable.    Language and Speech:  Normal (can name, repeat phrases, good spontaneous speech).    Fund of Knowledge:  Within acceptable range.     Cranial Nerves:  2,3,4,5,6,7,8,9,10,11,12 are unremarkable.     Sensory:  No overt sensory abnormalities to touch, pinprick and vibration and proprioception.      Motor:  No motor weakness.  Muscle strength, tone, bulk unremarkable.  No abnormal movements seen.  Fine motor skills are normal.      Coordination:  Coordination intact.  No clear ataxia or dysmetria on finger-nose-finger or heel-shin-toe testing.      Balance, Gait and Station: Slight gait ataxia.    Deep Tendon Reflexes:  DTR's (biceps, triceps, brachioradialis, knee jerks, ankle jerks) preserved and symmetric.      Plantar response:  Flexor bilaterally.       .  LABORATORY RESULTS          IMAGING RESULTS     Recent Results (from the past 24 hour(s))   CT Head w/o Contrast    Narrative    EXAM: CT HEAD W/O CONTRAST  LOCATION: [facility]  DATE: 3/13/2024    INDICATION: Subarachnoid " hemorrhage, hemorrhagic metastases  COMPARISON: Arch 13 2024.  TECHNIQUE: Routine CT Head without IV contrast. Multiplanar reformats. Dose reduction techniques were used.    FINDINGS:  INTRACRANIAL CONTENTS: Unchanged subarachnoid hemorrhage in the right occipital lobe. Unchanged nonspecific area of hyperattenuation in the left centrum semiovale extending towards the lentiform nucleus. No CT evidence of acute infarct. Mild presumed   chronic small vessel ischemic changes. Normal ventricles and sulci.     VISUALIZED ORBITS/SINUSES/MASTOIDS: Prior bilateral cataract surgery. Visualized portions of the orbits are otherwise unremarkable. Severe right maxillary sinus disease. No middle ear or mastoid effusion.    BONES/SOFT TISSUES: Probable sebaceous cyst overlying the midline occipital bone.      Impression    IMPRESSION:  1.  Unchanged subarachnoid hemorrhage in the right occipital lobe.  2.  Unchanged nonspecific hyperattenuation in the left centrum semiovale extending towards the lentiform nucleus.  3.  Chronic changes as above.   MR Brain w/o & w Contrast    Narrative    EXAM: MR BRAIN W/O and W CONTRAST  LOCATION: [facility]  DATE: 3/13/2024    INDICATION: Eval of possible intracranial mets, subarachnoid hemorrhage  COMPARISON:  Same day CT head, MRI brain 07/06/2023, PET/CT 8/8/2023 and 3/1/2024.  CONTRAST: 5 mL Gadavist  TECHNIQUE: Routine multiplanar multisequence head MRI without and with intravenous contrast.    FINDINGS:  INTRACRANIAL CONTENTS: No acute or subacute infarct.  Trace right parietal and occipital subarachnoid hemorrhage. No mass effect or midline shift.  Similar appearance of white matter lesions compared to 07/06/2021 in a patient with a history of multiple   sclerosis. Mild generalized cerebral atrophy. No hydrocephalus. Normal position of the cerebellar tonsils. No pathologic contrast enhancement.    SELLA: No abnormality accounting for technique.    OSSEOUS  STRUCTURES/SOFT TISSUES: Diffusely heterogeneous in a patient with osseous metastases. The major intracranial vascular flow voids are maintained.     ORBITS: Prior bilateral cataract surgery. Visualized portions of the orbits are otherwise unremarkable.     SINUSES/MASTOIDS:  Paranasal sinus mucosal thickening in a right ostiomeatal unit pattern of obstruction. No middle ear or mastoid effusion.       Impression    IMPRESSION:  1.  Similar trace right parieto-occipital subarachnoid hemorrhage.  2.  Known osseous metastases. No intracranial/parenchymal metastasis.             ASSESSMENT     Breast cancer with osseous metastases.  Recent onset of seizure disorder.  Multiple sclerosis  DVT  Subarachnoid hemorrhage      Patient with multiple ongoing issues as noted above.  Essentially, yesterday had 2 breakthrough seizures, which could have been triggered by history of breast cancer, or other factors.  Nevertheless, Keppra started appropriately, and should be continued indefinitely.    She does have a history of DVTs, and now has a subarachnoid hemorrhage.  I do not know if this was the cause or the result of her fall from the seizures.  Nevertheless, guidance about management would best be given by neurosurgery.  The key question here would be timeline of follow-up imaging, timeline of admission versus discharge, and timeline of resumption of anticoagulation.  All of these questions would be best answered by neurosurgery.    Multiple sclerosis is quiescent, and she has not been on treatment for several years.  No reason to revisit this decision.  She has slightly chronically ataxic gait, which could have been from residual from MS.  RECOMMENDATIONS   Follow-up on EEG results  Continue Keppra 500 mg twice daily.  Follow neurosurgery guidance on timeline of follow-up imaging, timeline of admission versus discharge, and timeline of resumption of anticoagulation.         Penny Perez MD   Neurologist, [facility]   March 14, 2024 1:12 PM        A total time of 85 minutes was spent on the care of this patient on the date of the visit, outside of time spent performing any procedures. Please excuse any typographical errors, as this note was generated using a Voice Recognition Software.

## 2024-03-14 NOTE — TELEPHONE ENCOUNTER
Insurance is not showing as active for patient.     Called [facility], they also said her insurance is showing not active.     Called patient at [phone],  let me know that he has the same insurance that is scanned in today through [employer].   He had to fill out the paperwork and sent this in just yesterday. It may take a up to a week for this to become active again.     Will postpone the three PA encounters until next week and will resubmit with the same insurance information.

## 2024-03-14 NOTE — H&P
[facility]    History and Physical - Hospitalist Service       Date of Admission:  3/13/2024    Assessment & Plan      Shaneka Alvarez is a 63 year old female with past medical history significant for metastatic breast cancer, MS, prior pulmonary embolism on anticoagulation admitted on 3/13/2024 with witnessed seizure activity.     Seizure   Concern for hemorrhagic metastases as well as small subarachnoid hemorrhage   Pt presented to outside ED after a witnessed seizure.   CT scan of the head was obtained and showed small areas of hypoattenuation involving the left striatum/centrum semiovale and left thalamus concerning for hemorrhages (hemorrhagic metastases not excluded). There is also a small area of cortical/subarachnoid hyperattenuation involving the right occipital lobe. Likely subcortical/subarachnoid hemorrhage.   * 6 hour repeat CT scan was stable.   * Neurosurgery was contacted in the ED and recommended transfer to [facility] for higher level of care.   * Pt loaded with 1g of keppra   - Admit to inpatient  - Cardiac monitoring  - Q2H neurochecks   - Seizure precautions.   - Continue IV keppra 500mg BID   - Brain MRI w/ and w/o contrast   - General neurology consult   - Neurosurgery consult   - Hold PTA eliquis     Metastatic Breast Cancer   Follows with [facility]. Currently receiving chemotherapy with Doxil and Onpro (neulasta).   - Oncology consulted     Leukocytosis  WBC count is markedly elevated to 41.5 on admission (WBC count was 9.3 on 3/6/24). Perhaps 2/2 Neulasta. Pt denies fever or other infectious symptoms.   - Monitor CBC  - Oncology consulted as noted above     Hx of PE  - Hold PTA eliquis     Mild hypokalemia  - Replace per protocol     Diet: Regular Diet   DVT Prophylaxis: Pneumatic Compression Devices  Huntley Catheter: Not present  Lines: PRESENT             Cardiac Monitoring: None  Code Status: Full Code     Clinically Significant Risk Factors Present on  Admission        # Hypokalemia: Lowest K = 3.1 mmol/L in last 2 days, will replace as needed      # Anion Gap Metabolic Acidosis: Highest Anion Gap = 22 mmol/L in last 2 days, will monitor and treat as appropriate   # Drug Induced Coagulation Defect: home medication list includes an anticoagulant medication                    Disposition Plan           Myesha Zepeda MD  Hospitalist Service  [facility]  Securely message with [employer] (more info)  Text page via [facility] Paging/Directory     ______________________________________________________________________    Chief Complaint   Seizure    History is obtained from the patient    History of Present Illness   Shaneka Alvarez is a 63 year old female who presented to an outside ED after a witnessed seizure. Pt apparently had seizure at home and then had another seizure after EMS arrived. She does not recall any of this. Currently she feels ok. She denies headache, pain, shortness of breath, fevers, chills, abdominal pain, cough, diarrhea.       Past Medical History    Past Medical History:   Diagnosis Date    Breast cancer (H)     Age 44    Cataract     left eye    Chemotherapy induced nausea and vomiting 12/15/2020    Common migraine     Esophageal reflux     H/O bilateral mastectomy     Mild major depression (H24)     Multiple sclerosis (H)     Pulmonary embolism (H)        Past Surgical History   Past Surgical History:   Procedure Laterality Date    CHOLECYSTECTOMY      ENDOBRONCHIAL ULTRASOUND FLEXIBLE N/A 09/05/2019    Procedure: Flexible Bronchoscopy, Endobronchial Ultrasound, Radial Probe;  Surgeon: Sree Sanchez MD;  Location: [facility] OR    HC REMOVAL OF OVARY/TUBE(S)      HERNIA REPAIR, UMBILICAL      INJECT SACROILIAC JOINT Bilateral 10/13/2023    Procedure: Sacroiliac Joint Injection;  Surgeon: Natalie Yusuf MD;  Location: [facility] OR    mastectomy  Bilateral 2006    MASTECTOMY, BILATERAL      PHACOEMULSIFICATION CLEAR CORNEA  WITH TORIC INTRAOCULAR LENS IMPLANT Left 2/8/2021    Procedure: PHACOEMULSIFICATION, COMPLEX CATARACT, WITH INTRAOCULAR LENS IMPLANT, RESIDENT TORIC LENS LEFT;  Surgeon: Luis Barkley MD;  Location: [facility] OR    PHACOEMULSIFICATION CLEAR CORNEA WITH TORIC INTRAOCULAR LENS IMPLANT Right 3/8/2021    Procedure: PHACOEMULSIFICATION, CATARACT, WITH INTRAOCULAR LENS IMPLANT, TORIC LENS RIGHT;  Surgeon: Luis Barkley MD;  Location: [facility] OR       Prior to Admission Medications   Prior to Admission Medications   Prescriptions Last Dose Informant Patient Reported? Taking?   Buprenorphine HCl (BELBUCA) 450 MCG FILM buccal film   No No   Sig: Place 1 Film (450 mcg) inside cheek every evening Place the 600mcg film every morning   Buprenorphine HCl (BELBUCA) 600 MCG FILM buccal film   No No   Sig: Place 1 Film (600 mcg) inside cheek every morning Continue taking the 450mcg film in the evening   Cholecalciferol (VITAMIN D3 PO)  Self Yes No   Sig: Take 2,000 Units by mouth every morning    LORazepam (ATIVAN) 0.5 MG tablet   No No   Sig: Take 1-2 tablets (0.5-1 mg) by mouth every 6 hours as needed for anxiety   OLANZapine (ZYPREXA) 5 MG tablet  Self Yes No   Sig: Take 2.5-5 mg by mouth 2 times daily as needed for nausea   ZOLMitriptan (ZOMIG) 5 MG nasal spray   No No   Sig: Spray 1 spray in nostril at onset of headache for migraine May repeat in 2 hours. Max 2 sprays/24 hours.   apixaban ANTICOAGULANT (ELIQUIS ANTICOAGULANT) 5 MG tablet   No No   Sig: Take 1 tablet (5 mg) by mouth 2 times daily   busPIRone (BUSPAR) 15 MG tablet   No No   Sig: Take 1 tablet (15 mg) by mouth 2 times daily   calcium citrate-vitamin D (CITRACAL) 315-250 MG-UNIT TABS  Self Yes No   Sig: Take 1 tablet by mouth every morning    doxepin (SINEQUAN) 10 MG/ML (HIGH CONC) solution   No No   Sig: Take 0.3 mLs (3 mg) by mouth at bedtime   galcanezumab-gnlm (EMGALITY) 120 MG/ML injection   No No   Sig: Inject 1 mL (120 mg) Subcutaneous every 28 days    metoclopramide (REGLAN) 5 MG tablet   No No   Sig: Take 1-2 tablets (5-10 mg) by mouth every 6 hours as needed (nausea/vomiting/migraine)   morphine (MSIR) 15 MG IR tablet   No No   Sig: Take 1-2 tablets (15-30 mg) by mouth every 3 hours as needed for pain   ondansetron (ZOFRAN ODT) 4 MG ODT tab   No No   Sig: Take 1 tablet (4 mg) by mouth every 8 hours as needed for nausea   propranolol ER (INDERAL LA) 80 MG 24 hr capsule   No No   Si mg every morning and 160 at night   ubrogepant (UBRELVY) 100 MG tablet   No No   Sig: Take 1 tablet (100 mg) by mouth at onset of headache (may repeat in 2 hours as needed. Max 200 mg in 24 hours.)   venlafaxine (EFFEXOR XR) 75 MG 24 hr capsule   No No   Sig: Take 3 capsules (225 mg) by mouth daily   vitamin B-2 (RIBOFLAVIN) 25 MG TABS tablet  Self Yes No   Sig: Take 1 tablet by mouth daily      Facility-Administered Medications: None        Review of Systems    The 10 point Review of Systems is negative other than noted in the HPI or here.     Physical Exam   Vital Signs: BP (!) 137/93 (BP Location: Right arm)   Pulse 79   Temp 99.6  F (37.6  C) (Oral)   Resp 16   SpO2 99%   Weight: 0 lbs 0 oz    Constitutional: Awake, alert, cooperative, no apparent distress.  Eyes: Conjunctiva and pupils examined and normal.  HEENT: Moist mucous membranes, normal dentition.  Respiratory: Clear to auscultation bilaterally, no crackles or wheezing.  Cardiovascular: Regular rate and rhythm, normal S1 and S2, and no murmur noted.  GI: Soft, non-distended, non-tender, normal bowel sounds.  Skin: No rashes, no cyanosis, no edema.  Musculoskeletal: No joint swelling, erythema or tenderness.  Neurologic: Cranial nerves 2-12 intact, normal strength and sensation.  Psychiatric: Alert, oriented to person, place and time, no obvious anxiety or depression.    Medical Decision Making       75 MINUTES SPENT BY ME on the date of service doing chart review, history, exam, documentation & further  activities per the note.      Data     I have personally reviewed the following data over the past 24 hrs:    41.5 (H)  \   12.6   / 197     137 91 (L) 10.8 /  168 (H)   3.1 (L) 24 0.83 \     ALT: 12 AST: 31 AP: 225 (H) TBILI: 0.5   ALB: 4.1 TOT PROTEIN: 7.0 LIPASE: N/A       Imaging results reviewed over the past 24 hrs:   Recent Results (from the past 24 hour(s))   CT Head w/o Contrast   Result Value    Radiologist flags Acute intracranial hemorrhage (AA)    Narrative    CT SCAN OF THE HEAD WITHOUT CONTRAST   3/13/2024 9:37 AM     HISTORY: Seizure, breast cancer with known metastases to bone.    TECHNIQUE:  Axial images of the head and coronal reformations without  IV contrast material. Radiation dose for this scan was reduced using  automated exposure control, adjustment of the mA and/or kV according  to patient size, or iterative reconstruction technique.    COMPARISON: Head CT 7/31/2023    FINDINGS: Small (about 1 cm or less) areas of interparenchymal  hyperattenuation involving the left stratum/internal capsule and left  thalamus measuring less than 1 cm, new compared to prior. Small area  of cortical/subarachnoid hyperattenuation involving the right  occipital lobe, new compared to the prior. No high-grade mass effect  or hydrocephalus. Background of volume loss and nonspecific patchy  white matter hypoattenuation. Old basal ganglia lacunar infarcts.    No acute osseous abnormality. Moderate right maxillary sinus mucosal  thickening with aerated secretions. Cystic lesion within the posterior  scalp, presumably epidermal inclusion or sebaceous cyst.      Impression    IMPRESSION:   1. Small areas of hyperattenuation involving left stratum/centrum  semiovale and left thalamus, concerning for hemorrhages (hemorrhagic  metastases not excluded), new compared to the prior.  2. Small area of cortical/subarachnoid hyperattenuation involving the  right occipital lobe, likely cortical/subarachnoid  hemorrhage  (hemorrhagic metastasis not excluded), new compared to the prior.    [Critical Result: Acute intracranial hemorrhage]    Finding was identified on 3/13/2024 9:56 AM.     Dr. Keller was contacted by me on 3/13/2024 10:04 AM and verbalized  understanding of the critical result.      THIERNO MI MD         SYSTEM ID:  [id number]   XR Chest Port 1 View    Narrative    CHEST PORTABLE 1 VIEW   3/13/2024 9:42 AM     HISTORY: Seizure.    COMPARISON: Chest x-ray 7/27/2023.      Impression    IMPRESSION: New irregular lobulated nodule opacification at the left  lung base is indeterminant. Pneumonia could have this appearance.  Neoplasm remains in the differential. This can be further evaluated  with CT. Left chest port appears stable. Stable subacute rib  fractures. Normal cardiac silhouette. Scattered surgical clips noted.    CARMITA TRACY MD         SYSTEM ID:  [id number]   CT Head w/o Contrast    Narrative    EXAM: CT HEAD W/O CONTRAST  LOCATION: [facility]  DATE: 3/13/2024    INDICATION: Subarachnoid hemorrhage, hemorrhagic metastases  COMPARISON: Arch 13 2024.  TECHNIQUE: Routine CT Head without IV contrast. Multiplanar reformats. Dose reduction techniques were used.    FINDINGS:  INTRACRANIAL CONTENTS: Unchanged subarachnoid hemorrhage in the right occipital lobe. Unchanged nonspecific area of hyperattenuation in the left centrum semiovale extending towards the lentiform nucleus. No CT evidence of acute infarct. Mild presumed   chronic small vessel ischemic changes. Normal ventricles and sulci.     VISUALIZED ORBITS/SINUSES/MASTOIDS: Prior bilateral cataract surgery. Visualized portions of the orbits are otherwise unremarkable. Severe right maxillary sinus disease. No middle ear or mastoid effusion.    BONES/SOFT TISSUES: Probable sebaceous cyst overlying the midline occipital bone.      Impression    IMPRESSION:  1.  Unchanged subarachnoid hemorrhage in the right occipital  lobe.  2.  Unchanged nonspecific hyperattenuation in the left centrum semiovale extending towards the lentiform nucleus.  3.  Chronic changes as above.

## 2024-03-14 NOTE — CONSULTS
[facility]    Neurosurgery Consultation     Date of Admission:  3/13/2024  Date of Consult (When I saw the patient): 03/14/24    Assessment & Plan   Shaneka Alvarez is a 63 year old female who was admitted on 3/13/2024. Shaneka Alvarez is a 63 year old female with past medical history significant for metastatic breast cancer, MS, prior pulmonary embolism on anticoagulation admitted on 3/13/2024 with witnessed seizure activity and imaging evidence below.     Per patient , he heard a loud noise and went into patient bedroom and she was half off her bed seizing. This lasted for a few minutes. He placed patient on her side and ensured her mouth was OK and called 911. Patient has no prior seizure history. Eliquis has been held.     Currently, patient denies headache, vision changes, speech changes, weakness, vomiting. Occasional nausea.     Study Result    Narrative & Impression   EXAM: MR BRAIN W/O and W CONTRAST  LOCATION: [facility]  DATE: 3/13/2024     INDICATION: Eval of possible intracranial mets, subarachnoid hemorrhage  COMPARISON:  Same day CT head, MRI brain 07/06/2023, PET/CT 8/8/2023 and 3/1/2024.  CONTRAST: 5 mL Gadavist  TECHNIQUE: Routine multiplanar multisequence head MRI without and with intravenous contrast.     FINDINGS:  INTRACRANIAL CONTENTS: No acute or subacute infarct.  Trace right parietal and occipital subarachnoid hemorrhage. No mass effect or midline shift.  Similar appearance of white matter lesions compared to 07/06/2021 in a patient with a history of multiple   sclerosis. Mild generalized cerebral atrophy. No hydrocephalus. Normal position of the cerebellar tonsils. No pathologic contrast enhancement.     SELLA: No abnormality accounting for technique.     OSSEOUS STRUCTURES/SOFT TISSUES: Diffusely heterogeneous in a patient with osseous metastases. The major intracranial vascular flow voids are maintained.      ORBITS: Prior  bilateral cataract surgery. Visualized portions of the orbits are otherwise unremarkable.      SINUSES/MASTOIDS:  Paranasal sinus mucosal thickening in a right ostiomeatal unit pattern of obstruction. No middle ear or mastoid effusion.                                                                       IMPRESSION:  1.  Similar trace right parieto-occipital subarachnoid hemorrhage.  2.  Known osseous metastases. No intracranial/parenchymal metastasis.     Clinical history, imaging and plans reviewed myself as well as with Dr. Lin. Neuro checks changed to q4H to allow patient more rest. No urgent surgical intervention recommended. Reviewed findings of trace SAH right parieto occipital region. We will follow up in a few weeks with head CT prior. Otherwise, recommend oncology and neurology involvement and treatment as indicated.     Our team will sign off at this time, our office will coordinate follow ups.     I have discussed the following assessment and plan with Dr. Lin who is in agreement with the initial plan and will follow up with further consultation recommendations.    Kathy Benjamin PA-C  [facility] Neurosurgery  [address] [address]  Code Status    Full Code    Reason for Consult   Reason for consult: I was asked by Dr. Zepeda to evaluate this patient for trace SAH right parietal occipital.    Primary Care Physician   [facility]    Chief Complaint   Seizure     History is obtained from the patient, electronic health record, and emergency department physician    History of Present Illness   Shaneka Alvarez is a 63 year old female with past medical history significant for metastatic breast cancer, MS, prior pulmonary embolism on anticoagulation admitted on 3/13/2024 with witnessed seizure activity and imaging evidence below.     Per patient , he heard a loud noise and went into patient bedroom and she was half off  her bed seizing. This lasted for a few minutes. He placed patient on her side and ensured her mouth was OK and called 911. Patient has no prior seizure history. Eliquis has been held.     Currently, patient denies headache, vision changes, speech changes, weakness, vomiting. Occasional nausea.     Study Result    Narrative & Impression   EXAM: MR BRAIN W/O and W CONTRAST  LOCATION: [facility]  DATE: 3/13/2024     INDICATION: Eval of possible intracranial mets, subarachnoid hemorrhage  COMPARISON:  Same day CT head, MRI brain 07/06/2023, PET/CT 8/8/2023 and 3/1/2024.  CONTRAST: 5 mL Gadavist  TECHNIQUE: Routine multiplanar multisequence head MRI without and with intravenous contrast.     FINDINGS:  INTRACRANIAL CONTENTS: No acute or subacute infarct.  Trace right parietal and occipital subarachnoid hemorrhage. No mass effect or midline shift.  Similar appearance of white matter lesions compared to 07/06/2021 in a patient with a history of multiple   sclerosis. Mild generalized cerebral atrophy. No hydrocephalus. Normal position of the cerebellar tonsils. No pathologic contrast enhancement.     SELLA: No abnormality accounting for technique.     OSSEOUS STRUCTURES/SOFT TISSUES: Diffusely heterogeneous in a patient with osseous metastases. The major intracranial vascular flow voids are maintained.      ORBITS: Prior bilateral cataract surgery. Visualized portions of the orbits are otherwise unremarkable.      SINUSES/MASTOIDS:  Paranasal sinus mucosal thickening in a right ostiomeatal unit pattern of obstruction. No middle ear or mastoid effusion.                                                                       IMPRESSION:  1.  Similar trace right parieto-occipital subarachnoid hemorrhage.  2.  Known osseous metastases. No intracranial/parenchymal metastasis.       Past Medical History   I have reviewed this patient's medical history and updated it with pertinent information if needed.    Past Medical History:   Diagnosis Date    Breast cancer (H)     Age 44    Cataract     left eye    Chemotherapy induced nausea and vomiting 12/15/2020    Common migraine     Esophageal reflux     H/O bilateral mastectomy     Mild major depression (H24)     Multiple sclerosis (H)     Pulmonary embolism (H)        Past Surgical History   I have reviewed this patient's surgical history and updated it with pertinent information if needed.  Past Surgical History:   Procedure Laterality Date    CHOLECYSTECTOMY      ENDOBRONCHIAL ULTRASOUND FLEXIBLE N/A 09/05/2019    Procedure: Flexible Bronchoscopy, Endobronchial Ultrasound, Radial Probe;  Surgeon: Sree Sanchez MD;  Location: [facility] OR    HC REMOVAL OF OVARY/TUBE(S)      HERNIA REPAIR, UMBILICAL      INJECT SACROILIAC JOINT Bilateral 10/13/2023    Procedure: Sacroiliac Joint Injection;  Surgeon: Natalie Yusuf MD;  Location: [facility] OR    mastectomy  Bilateral 2006    MASTECTOMY, BILATERAL      PHACOEMULSIFICATION CLEAR CORNEA WITH TORIC INTRAOCULAR LENS IMPLANT Left 2/8/2021    Procedure: PHACOEMULSIFICATION, COMPLEX CATARACT, WITH INTRAOCULAR LENS IMPLANT, RESIDENT TORIC LENS LEFT;  Surgeon: Luis Barkley MD;  Location: [facility] OR    PHACOEMULSIFICATION CLEAR CORNEA WITH TORIC INTRAOCULAR LENS IMPLANT Right 3/8/2021    Procedure: PHACOEMULSIFICATION, CATARACT, WITH INTRAOCULAR LENS IMPLANT, TORIC LENS RIGHT;  Surgeon: Luis Barkley MD;  Location: [facility] OR       Prior to Admission Medications   Prior to Admission Medications   Prescriptions Last Dose Informant Patient Reported? Taking?   Buprenorphine HCl (BELBUCA) 450 MCG FILM buccal film   No No   Sig: Place 1 Film (450 mcg) inside cheek every evening Place the 600mcg film every morning   Buprenorphine HCl (BELBUCA) 600 MCG FILM buccal film   No No   Sig: Place 1 Film (600 mcg) inside cheek every morning Continue taking the 450mcg film in the evening   Cholecalciferol (VITAMIN D3 PO)  Self Yes No    Sig: Take 2,000 Units by mouth every morning    LORazepam (ATIVAN) 0.5 MG tablet   No No   Sig: Take 1-2 tablets (0.5-1 mg) by mouth every 6 hours as needed for anxiety   OLANZapine (ZYPREXA) 5 MG tablet  Self Yes No   Sig: Take 2.5-5 mg by mouth 2 times daily as needed for nausea   ZOLMitriptan (ZOMIG) 5 MG nasal spray   No No   Sig: Spray 1 spray in nostril at onset of headache for migraine May repeat in 2 hours. Max 2 sprays/24 hours.   apixaban ANTICOAGULANT (ELIQUIS ANTICOAGULANT) 5 MG tablet   No No   Sig: Take 1 tablet (5 mg) by mouth 2 times daily   busPIRone (BUSPAR) 15 MG tablet   No No   Sig: Take 1 tablet (15 mg) by mouth 2 times daily   calcium citrate-vitamin D (CITRACAL) 315-250 MG-UNIT TABS  Self Yes No   Sig: Take 1 tablet by mouth every morning    doxepin (SINEQUAN) 10 MG/ML (HIGH CONC) solution   No No   Sig: Take 0.3 mLs (3 mg) by mouth at bedtime   galcanezumab-gnlm (EMGALITY) 120 MG/ML injection   No No   Sig: Inject 1 mL (120 mg) Subcutaneous every 28 days   metoclopramide (REGLAN) 5 MG tablet   No No   Sig: Take 1-2 tablets (5-10 mg) by mouth every 6 hours as needed (nausea/vomiting/migraine)   morphine (MSIR) 15 MG IR tablet   No No   Sig: Take 1-2 tablets (15-30 mg) by mouth every 3 hours as needed for pain   ondansetron (ZOFRAN ODT) 4 MG ODT tab   No No   Sig: Take 1 tablet (4 mg) by mouth every 8 hours as needed for nausea   propranolol ER (INDERAL LA) 80 MG 24 hr capsule   No No   Si mg every morning and 160 at night   ubrogepant (UBRELVY) 100 MG tablet   No No   Sig: Take 1 tablet (100 mg) by mouth at onset of headache (may repeat in 2 hours as needed. Max 200 mg in 24 hours.)   venlafaxine (EFFEXOR XR) 75 MG 24 hr capsule   No No   Sig: Take 3 capsules (225 mg) by mouth daily   vitamin B-2 (RIBOFLAVIN) 25 MG TABS tablet  Self Yes No   Sig: Take 1 tablet by mouth daily      Facility-Administered Medications: None     Allergies   Allergies   Allergen Reactions    Bactrim  [Sulfamethoxazole-Trimethoprim] Other (See Comments)     Internal bleeding    Copaxone [Glatiramer] Hives       Social History   I have reviewed this patient's social history and updated it with pertinent information if needed. Shaneka Alvarez  reports that she quit smoking about 18 years ago. Her smoking use included cigarettes. She has never been exposed to tobacco smoke. She has never used smokeless tobacco. She reports that she does not currently use alcohol after a past usage of about 2.0 standard drinks of alcohol per week. She reports that she does not use drugs.    Family History   I have reviewed this patient's family history and updated it with pertinent information if needed.   Family History   Problem Relation Age of Onset    Breast Cancer Maternal Aunt 50         at 85    Breast Cancer Cousin 40    Breast Cancer Mother 49        2nd primary, contralateral breast at 69;  at 86    Melanoma Mother     Breast Cancer Maternal Grandmother 40    Ovarian Cancer Maternal Grandmother     Breast Cancer Paternal Grandmother 50         at 60    Brain Cancer Cousin 20    Breast Cancer Paternal Aunt 50         at 60    Breast Cancer Cousin 45        paternal cousin    Anesthesia Reaction No family hx of     Deep Vein Thrombosis No family hx of        Review of Systems    ROS: 10 point ROS neg other than the symptoms noted above in the HPI.      Physical Exam   Temp: 99.4  F (37.4  C) Temp src: Oral BP: (!) 124/90 Pulse: 84   Resp: 16 SpO2: 99 % O2 Device: None (Room air)    Vital Signs with Ranges  Temp:  [98  F (36.7  C)-99.6  F (37.6  C)] 99.4  F (37.4  C)  Pulse:  [74-96] 84  Resp:  [11-43] 16  BP: (113-151)/() 124/90  SpO2:  [96 %-100 %] 99 %  0 lbs 0 oz     , Blood pressure (!) 124/90, pulse 84, temperature 99.4  F (37.4  C), temperature source Oral, resp. rate 16, SpO2 99%, not currently breastfeeding.  0 lbs 0 oz  NEUROLOGICAL EXAMINATION:   Mental status:  Alert and Oriented x 3, speech  is fluent.  Cranial nerves:  II-XII intact.   Motor:  Strength is 5/5 throughout the upper and lower extremities  Shoulder Abduction:  Right:  5/5   Left:  5/5  Biceps:                      Right:  5/5   Left:  5/5  Triceps:                     Right:  5/5   Left:  5/5  Wrist Extensors:       Right:  5/5   Left:  5/5  Wrist Flexors:           Right:  5/5   Left:  5/5  interosseus :            Right:  5/5   Left:  5/5   Hip Flexor:                Right: 5/5  Left:  5/5  Hip Adductor:             Right:  5/5  Left:  5/5  Hip Abductor:             Right:  5/5  Left:  5/5  Gastroc Soleus:        Right:  5/5  Left:  5/5  Tib/Ant:                      Right:  5/5  Left:  5/5  EHL:                     Right:  5/5  Left:  5/5  Sensation:  intact  Reflexes:    Negative Clonus.    Coordination:  Smooth finger to nose testing.   Negative pronator drift.        Data   All new lab and imaging data was personally reviewed by me.  Study Result    Narrative & Impression   EXAM: MR BRAIN W/O and W CONTRAST  LOCATION: [facility]  DATE: 3/13/2024     INDICATION: Eval of possible intracranial mets, subarachnoid hemorrhage  COMPARISON:  Same day CT head, MRI brain 07/06/2023, PET/CT 8/8/2023 and 3/1/2024.  CONTRAST: 5 mL Gadavist  TECHNIQUE: Routine multiplanar multisequence head MRI without and with intravenous contrast.     FINDINGS:  INTRACRANIAL CONTENTS: No acute or subacute infarct.  Trace right parietal and occipital subarachnoid hemorrhage. No mass effect or midline shift.  Similar appearance of white matter lesions compared to 07/06/2021 in a patient with a history of multiple   sclerosis. Mild generalized cerebral atrophy. No hydrocephalus. Normal position of the cerebellar tonsils. No pathologic contrast enhancement.     SELLA: No abnormality accounting for technique.     OSSEOUS STRUCTURES/SOFT TISSUES: Diffusely heterogeneous in a patient with osseous metastases. The major intracranial vascular  flow voids are maintained.      ORBITS: Prior bilateral cataract surgery. Visualized portions of the orbits are otherwise unremarkable.      SINUSES/MASTOIDS:  Paranasal sinus mucosal thickening in a right ostiomeatal unit pattern of obstruction. No middle ear or mastoid effusion.                                                                       IMPRESSION:  1.  Similar trace right parieto-occipital subarachnoid hemorrhage.  2.  Known osseous metastases. No intracranial/parenchymal metastasis.     Narrative & Impression   EXAM: CT HEAD W/O CONTRAST  LOCATION: [facility]  DATE: 3/13/2024     INDICATION: Subarachnoid hemorrhage, hemorrhagic metastases  COMPARISON: Arch 13 2024.  TECHNIQUE: Routine CT Head without IV contrast. Multiplanar reformats. Dose reduction techniques were used.     FINDINGS:  INTRACRANIAL CONTENTS: Unchanged subarachnoid hemorrhage in the right occipital lobe. Unchanged nonspecific area of hyperattenuation in the left centrum semiovale extending towards the lentiform nucleus. No CT evidence of acute infarct. Mild presumed   chronic small vessel ischemic changes. Normal ventricles and sulci.      VISUALIZED ORBITS/SINUSES/MASTOIDS: Prior bilateral cataract surgery. Visualized portions of the orbits are otherwise unremarkable. Severe right maxillary sinus disease. No middle ear or mastoid effusion.     BONES/SOFT TISSUES: Probable sebaceous cyst overlying the midline occipital bone.                                                                      IMPRESSION:  1.  Unchanged subarachnoid hemorrhage in the right occipital lobe.  2.  Unchanged nonspecific hyperattenuation in the left centrum semiovale extending towards the lentiform nucleus.  3.  Chronic changes as above.     Narrative & Impression   CT SCAN OF THE HEAD WITHOUT CONTRAST   3/13/2024 9:37 AM      HISTORY: Seizure, breast cancer with known metastases to bone.     TECHNIQUE:  Axial images of the  head and coronal reformations without  IV contrast material. Radiation dose for this scan was reduced using  automated exposure control, adjustment of the mA and/or kV according  to patient size, or iterative reconstruction technique.     COMPARISON: Head CT 7/31/2023     FINDINGS: Small (about 1 cm or less) areas of interparenchymal  hyperattenuation involving the left stratum/internal capsule and left  thalamus measuring less than 1 cm, new compared to prior. Small area  of cortical/subarachnoid hyperattenuation involving the right  occipital lobe, new compared to the prior. No high-grade mass effect  or hydrocephalus. Background of volume loss and nonspecific patchy  white matter hypoattenuation. Old basal ganglia lacunar infarcts.     No acute osseous abnormality. Moderate right maxillary sinus mucosal  thickening with aerated secretions. Cystic lesion within the posterior  scalp, presumably epidermal inclusion or sebaceous cyst.                                                                      IMPRESSION:   1. Small areas of hyperattenuation involving left stratum/centrum  semiovale and left thalamus, concerning for hemorrhages (hemorrhagic  metastases not excluded), new compared to the prior.  2. Small area of cortical/subarachnoid hyperattenuation involving the  right occipital lobe, likely cortical/subarachnoid hemorrhage  (hemorrhagic metastasis not excluded), new compared to the prior.     [Critical Result: Acute intracranial hemorrhage]     Finding was identified on 3/13/2024 9:56 AM.      Dr. Keller was contacted by me on 3/13/2024 10:04 AM and verbalized  understanding of the critical result.       THIERNO MI MD        CBC RESULTS:   Recent Labs   Lab Test 03/13/24  0856   WBC 41.5*   RBC 4.56   HGB 12.6   HCT 38.2   MCV 84   MCH 27.6   MCHC 33.0   RDW 15.9*        Basic Metabolic Panel:  Lab Results   Component Value Date     03/13/2024     07/07/2021      Lab Results    Component Value Date    POTASSIUM 3.1 03/13/2024    POTASSIUM 4.1 05/26/2023    POTASSIUM 3.9 07/07/2021     Lab Results   Component Value Date    CHLORIDE 91 03/13/2024    CHLORIDE 107 05/26/2023    CHLORIDE 104 07/07/2021     Lab Results   Component Value Date    RAFAELA 9.8 03/13/2024    RAFAELA 9.9 07/07/2021     Lab Results   Component Value Date    CO2 24 03/13/2024    CO2 30 05/26/2023    CO2 31 07/07/2021     Lab Results   Component Value Date    BUN 10.8 03/13/2024    BUN 10 05/26/2023    BUN 15 07/07/2021     Lab Results   Component Value Date    CR 0.83 03/13/2024    CR 0.69 07/07/2021     Lab Results   Component Value Date     03/13/2024    GLC 72 03/01/2024     05/26/2023     07/07/2021     INR:  Lab Results   Component Value Date    INR 1.20 07/31/2023    INR 1.05 09/24/2020    INR 1.04 09/04/2019    INR 1.09 08/30/2019

## 2024-03-14 NOTE — PLAN OF CARE
Reason for Admission: Seizure, SAH    Cognitive/Mentation: A&O x4  Neuros/CMS:  Intact, equal strength, reactive and equal pupils, no numbness or tingling.   VS: Stable on RA  GI: Continent, per pt last BM 2 days ago  : Continent.   Pulmonary: LS clear.   Pain: Denies    Drains/Lines: L chest port SL  Skin: Intact  Activity: not oob yet  Diet: Regular diet, thin liquids.     Consults: Onc, neurology, neurosurgery  Therapies recs: pending  Discharge: pending    Aggression Spotlight Tool: Green     End of shift summary: Brain MRI completed. Limb alert on right arm. Pt anxious and tearful this shift otherwise stable.

## 2024-03-14 NOTE — TELEPHONE ENCOUNTER
Insurance is not showing as active for patient.     Called [facility], they also said her insurance is showing not active.     Called patient at [phone],  let me know that he has the same insurance that is scanned in today through [employer].   He had to fill out the paperwork and sent this in just yesterday. It may take a up to a week for this to become active again.     Will postpone the three PA encounters until next week and will resubmit with the same insurance information.

## 2024-03-14 NOTE — CONSULTS
[facility]    Hematology/Oncology Consult Note      Date of Admission:  3/13/2024  Date of Consult:  03/14/24  Reason for Consult: Metastatic breast cancer  Primary Oncologist Dr Zepeda      ASSESSMENT/PLAN:  Shaneka is a 63-year-old female with metastatic breast cancer to the bones currently on Doxil    Awaiting neurosurgery and neurology consultation.  At this time I am not convinced that this is related to new metastatic disease versus hemorrhage from possibly anticoagulation.  Would like neurology's neurosurgery's input as the MRI does not show any intraparenchymal metastases.  Most recent PET/CT on 3/1/2024 overall showed stable disease and/or very mild progression.    Due to the recent bleed if neurology /NSG feels that she should remain off Anticoagulation it is reasonable to hold off, hx of PE but with recent stablization of malignancy ok to hold for now, long term may go on half the dose of eliquis once cleared with neurology due to constant risk of thrombosis with metasatic cancer      Metastatic breast cancer on doxil, stable  2. Leukocytosis due to neulasta  3. Hemorrhage intracranial: hold elqiuis plan per Nsg and neurology  PE history once cleared would suggest half the dose of anticoagulation , hold elqiuis for now. Timin of resumption per neurosurery    4.  Palliative care consult for weight loss and chronic nausea (symptom management)      Total time spent on day of visit, including review of tests, obtaining/reviewing separately obtained history, ordering medications/tests/procedures, communicating with PCP/consultants, and documenting in electronic medical record: 85 minutes           HISTORY OF PRESENT ILLNESS: Shaneka Alvarez is a 63 year old female with a history of metastatic breast cancer. To the bones   She sees Dr. Caputo on doxil last treatment 3/6/2024.  She presented to the ER yesterday with A witnessed seizure.  CT of the head completed showed unchanged subarachnoid  hemorrhage in the right occipital lobe.  Unchanged nonspecific hyperattenuation in the left central semiovale extended towards the lentiform nucleus.  Brain MRI showed similar trace right parietal occipital subarachnoid hemorrhage with known osseous metastases no intraparenchymal metastases.  Neurology has been consulted.  Neurosurgery also consulted.  She has a history of PE and Eliquis is on hold  Last Doxil given on 3/6 with Neulasta    Not hungry, laying comfortably in bed      REVIEW OF SYSTEMS:   14 point ROS was reviewed and is negative other than as noted above in HPI.       MEDICATIONS:  Current Facility-Administered Medications   Medication    calcium carbonate (TUMS) chewable tablet 1,000 mg    levETIRAcetam (KEPPRA) intermittent infusion 500 mg    lidocaine (LMX4) cream    lidocaine 1 % 0.1-1 mL    LORazepam (ATIVAN) injection 0.5-1 mg    Medication Instruction    ondansetron (ZOFRAN ODT) ODT tab 4 mg    Or    ondansetron (ZOFRAN) injection 4 mg    prochlorperazine (COMPAZINE) injection 10 mg    Or    prochlorperazine (COMPAZINE) tablet 10 mg    Or    prochlorperazine (COMPAZINE) suppository 25 mg    sodium chloride (PF) 0.9% PF flush 3 mL    sodium chloride (PF) 0.9% PF flush 3 mL     Facility-Administered Medications Ordered in Other Encounters   Medication    heparin 100 unit/mL injection 5 mL    sodium chloride (PF) 0.9% PF flush 10 mL         ALLERGIES:  Allergies   Allergen Reactions    Bactrim [Sulfamethoxazole-Trimethoprim] Other (See Comments)     Internal bleeding    Copaxone [Glatiramer] Hives         PAST MEDICAL HISTORY:  Past Medical History:   Diagnosis Date    Breast cancer (H)     Age 44    Cataract     left eye    Chemotherapy induced nausea and vomiting 12/15/2020    Common migraine     Esophageal reflux     H/O bilateral mastectomy     Mild major depression (H24)     Multiple sclerosis (H)     Pulmonary embolism (H)          PAST SURGICAL HISTORY:  Past Surgical History:   Procedure  Laterality Date    CHOLECYSTECTOMY      ENDOBRONCHIAL ULTRASOUND FLEXIBLE N/A 2019    Procedure: Flexible Bronchoscopy, Endobronchial Ultrasound, Radial Probe;  Surgeon: Sree Sanchez MD;  Location: [facility] OR    HC REMOVAL OF OVARY/TUBE(S)      HERNIA REPAIR, UMBILICAL      INJECT SACROILIAC JOINT Bilateral 10/13/2023    Procedure: Sacroiliac Joint Injection;  Surgeon: Natalie Yusuf MD;  Location: [facility] OR    mastectomy  Bilateral 2006    MASTECTOMY, BILATERAL      PHACOEMULSIFICATION CLEAR CORNEA WITH TORIC INTRAOCULAR LENS IMPLANT Left 2021    Procedure: PHACOEMULSIFICATION, COMPLEX CATARACT, WITH INTRAOCULAR LENS IMPLANT, RESIDENT TORIC LENS LEFT;  Surgeon: Luis Barkley MD;  Location: [facility] OR    PHACOEMULSIFICATION CLEAR CORNEA WITH TORIC INTRAOCULAR LENS IMPLANT Right 3/8/2021    Procedure: PHACOEMULSIFICATION, CATARACT, WITH INTRAOCULAR LENS IMPLANT, TORIC LENS RIGHT;  Surgeon: Luis Barkley MD;  Location: [facility] OR         SOCIAL HISTORY:  Social History     Socioeconomic History    Marital status:      Spouse name: Kash    Number of children: 3    Years of education: Not on file    Highest education level: Not on file   Occupational History     Employer: [employer]   Tobacco Use    Smoking status: Former     Types: Cigarettes     Quit date: 2005     Years since quittin.2     Passive exposure: Never (per pt)    Smokeless tobacco: Never   Vaping Use    Vaping Use: Never used   Substance and Sexual Activity    Alcohol use: Not Currently     Alcohol/week: 2.0 standard drinks of alcohol     Types: 1 Glasses of wine, 1 Cans of beer per week    Drug use: No    Sexual activity: Yes     Partners: Male     Birth control/protection: None     Comment: not needed after chemo   Other Topics Concern    Parent/sibling w/ CABG, MI or angioplasty before 65F 55M? Not Asked   Social History Narrative    Not on file     Social Determinants of Health     Financial Resource  "Strain: High Risk (2024)    Financial Resource Strain     Within the past 12 months, have you or your family members you live with been unable to get utilities (heat, electricity) when it was really needed?: Yes   Food Insecurity: Low Risk  (2024)    Food Insecurity     Within the past 12 months, did you worry that your food would run out before you got money to buy more?: No     Within the past 12 months, did the food you bought just not last and you didn t have money to get more?: No   Transportation Needs: Low Risk  (2024)    Transportation Needs     Within the past 12 months, has lack of transportation kept you from medical appointments, getting your medicines, non-medical meetings or appointments, work, or from getting things that you need?: No   Physical Activity: Not on file   Stress: Not on file   Social Connections: Not on file   Interpersonal Safety: Not on file   Housing Stability: Low Risk  (2024)    Housing Stability     Do you have housing? : Yes     Are you worried about losing your housing?: No         FAMILY HISTORY:  Family History   Problem Relation Age of Onset    Breast Cancer Maternal Aunt 50         at 85    Breast Cancer Cousin 40    Breast Cancer Mother 49        2nd primary, contralateral breast at 69;  at 86    Melanoma Mother     Breast Cancer Maternal Grandmother 40    Ovarian Cancer Maternal Grandmother     Breast Cancer Paternal Grandmother 50         at 60    Brain Cancer Cousin 20    Breast Cancer Paternal Aunt 50         at 60    Breast Cancer Cousin 45        paternal cousin    Anesthesia Reaction No family hx of     Deep Vein Thrombosis No family hx of          PHYSICAL EXAM:  Vital signs:  Temp: 99.4  F (37.4  C) Temp src: Oral BP: (!) 124/90 Pulse: 84   Resp: 16 SpO2: 99 % O2 Device: None (Room air)        Estimated body mass index is 20.85 kg/m  as calculated from the following:    Height as of 3/5/24: 1.575 m (5' 2\").    Weight as of 3/6/24: " 51.7 kg (114 lb).    ECO  GENERAL/CONSTITUTIONAL: No acute distress.  EYES: Pupils are equal, round, and react to light and accommodation. Extraocular movements intact.  No scleral icterus.  ENT/MOUTH: Neck supple. Oropharynx clear, no mucositis.  LYMPH: No anterior cervical, posterior cervical, supraclavicular, axillary or inguinal adenopathy.   RESPIRATORY: Clear to auscultation bilaterally. No crackles or wheezing.   CARDIOVASCULAR: Regular rate and rhythm without murmurs, gallops, or rubs.  GASTROINTESTINAL: No hepatosplenomegaly, masses, or tenderness. The patient has normal bowel sounds. No guarding.  No distention.  MUSCULOSKELETAL: Warm and well-perfused, no cyanosis, clubbing, or edema.  NEUROLOGIC: Cranial nerves II-XII are intact. Alert, oriented, answers questions appropriately.  INTEGUMENTARY: No rashes or jaundice.  GAIT: Steady, does not use assistive device      LABS:  CBC RESULTS:   Recent Labs   Lab Test 24  0856   WBC 41.5*   RBC 4.56   HGB 12.6   HCT 38.2   MCV 84   MCH 27.6   MCHC 33.0   RDW 15.9*          Recent Labs   Lab Test 24  0856 24  1251    139   POTASSIUM 3.1* 3.8   CHLORIDE 91* 101   CO2 24 29   ANIONGAP 22* 9   * 99   BUN 10.8 6.5*   CR 0.83 0.73   RAFAELA 9.8 8.9         PATHOLOGY:    na  IMAGING:  noted          Thank you for the opportunity to participate in this patient's care.  Please call with any questions.    Maciej Bailey MD  Hematology/Oncology  [facility]

## 2024-03-15 NOTE — DISCHARGE SUMMARY
[facility]    Discharge Summary  Hospitalist    Date of Admission:  3/13/2024  Date of Discharge:  3/15/2024  Discharging Provider: Sylvester Oswald MD, MD    Discharge Diagnoses      New onset seizure   Trace right parietal and occipital subarachnoid hemorrhage  Metastatic Breast Cancer   Leukocytosis  Hx of PE  Hypokalemia   Moderate malnutrition in the context of chronic disease      Hospital Course:    Shaneka Alvarez is a 63 year old female with past medical history significant for metastatic breast cancer, MS, prior pulmonary embolism on anticoagulation admitted on 3/13/2024 with witnessed seizure activity.      New onset seizure   Trace right parietal and occipital subarachnoid hemorrhage  -Pt presented to outside ED after a witnessed seizure.   -CT scan of the head was obtained and showed small areas of hypoattenuation involving the left striatum/centrum semiovale and left thalamus concerning for hemorrhages (hemorrhagic metastases not excluded).   -MRI brain today shows trace right parietal-occipital subarachnoid hemorrhage, known osseous metastasis, no intracranial/parenchymal metastasis  * Neurosurgery was contacted in the ED and recommended transfer to [facility] for higher level of care.   * Pt loaded with 1g of keppra   -Followed by general neurology, neurosurgery and oncology  -Started on Keppra, neurology recommends 500 mg twice a day  -Hold prior to admission Eliquis.  Neurosurgery recommends resuming in 2 weeks if follow-up CT is unremarkable.     Metastatic Breast Cancer   Follows with [facility]. Currently receiving chemotherapy with Doxil and Onpro (neulasta).   -Oncology followed     Leukocytosis most likely from Neulasta  WBC count is markedly elevated to 41.5 on admission (WBC count was 9.3 on 3/6/24).  Most likely 2/2 Neulasta.   -WBC trending down  -Patient with a temperature of 100.8, yesterday, afebrile today, primary infectious workup is negative.  UA  unremarkable.  Chest x-ray without pneumonia.  Blood cultures negative thus far.  Low concern for infection at this time    Hx of PE  -Hold PTA eliquis given SAH above     Mild hypokalemia  Moderate malnutrition in the context of chronic disease  -Replace per protocol        Sylvester Oswald MD, MD    Significant Results and Procedures   See below    Pending Results     Unresulted Labs Ordered in the Past 30 Days of this Admission       Date and Time Order Name Status Description    3/14/2024 12:30 PM Blood Culture Arm, Right Preliminary     3/14/2024 12:30 PM Blood Culture Hand, Left Preliminary             Code Status   Full Code       Primary Care Physician   [facility]    Physical Exam   Temp: 98.7  F (37.1  C) Temp src: Oral BP: (!) 136/96 Pulse: 96   Resp: 18 SpO2: 100 % O2 Device: None (Room air)      Constitutional: AAOX3, NAD  Respiratory: CTA B/L, Normal WOB  Cardiovascular: RRR, No murmur  GI: Soft, Non- tender, BS- normoactive  Neuro: CN- grossly intact, Moving all 4 extremities.     Discharge Disposition   Discharged to home  Condition at discharge: Stable    Consultations This Hospital Stay   NEUROSURGERY IP CONSULT  NEUROLOGY IP CONSULT  HEMATOLOGY & ONCOLOGY IP CONSULT  PALLIATIVE CARE ADULT IP CONSULT    Time Spent on this Encounter   I, Sylvester Oswald MD, personally saw the patient today and spent greater than 30 minutes discharging this patient.    Discharge Orders      CT Head w/o contrast*     Follow-up and recommended labs and tests     Your Neurosurgical follow-up appointments have been scheduled. You may call [phone] to make, confirm or change your appointment dates and/or times.     Activity    Your activity upon discharge: Limit heavy lifting until follow up visit. Ok to walk as tolerated. No high impact activities until follow-up visit.     Reason for your hospital stay    Seizure     Follow-up and recommended labs and tests     Follow up with primary care provider,  [facility], within 7 days for hospital follow- up.    Neurosurgery in 2 weeks with a head CT prior  Neurology in 4 weeks     Activity    Your activity upon discharge: activity as tolerated     Discharge Instructions    No driving for 3 months.  Hold Eliquis for at least 2 weeks until repeat head CT and clearance by neurosurgery     Full Code     Diet    Follow this diet upon discharge: Orders Placed This Encounter      Snacks/Supplements Adult: Ensure Clear; Between Meals      Regular Diet Adult     Discharge Medications   Current Discharge Medication List        START taking these medications    Details   levETIRAcetam (KEPPRA) 500 MG tablet Take 1 tablet (500 mg) by mouth 2 times daily  Qty: 60 tablet, Refills: 1    Comments: [facility] [phone].  Associated Diagnoses: Seizure (H)           CONTINUE these medications which have CHANGED    Details   apixaban ANTICOAGULANT (ELIQUIS ANTICOAGULANT) 5 MG tablet Take 1 tablet (5 mg) by mouth 2 times daily  Qty: 60 tablet, Refills: 11    Comments: Resume on 29th March, 2024 if follow-up CT is negative and cleared by neurosurgery  Associated Diagnoses: Acute pulmonary embolism without acute cor pulmonale, unspecified pulmonary embolism type (H)           CONTINUE these medications which have NOT CHANGED    Details   !! Buprenorphine HCl (BELBUCA) 450 MCG FILM buccal film Place 1 Film (450 mcg) inside cheek every evening Place the 600mcg film every morning  Qty: 30 Film, Refills: 0    Associated Diagnoses: Cancer associated pain; Sacroiliac joint pain      !! Buprenorphine HCl (BELBUCA) 600 MCG FILM buccal film Place 1 Film (600 mcg) inside cheek every morning Continue taking the 450mcg film in the evening  Qty: 30 Film, Refills: 0    Associated Diagnoses: Cancer associated pain; Sacroiliac joint pain      busPIRone (BUSPAR) 15 MG tablet Take 1 tablet (15 mg) by mouth 2 times daily  Qty: 180  tablet, Refills: 3    Associated Diagnoses: Anxiety      calcium citrate-vitamin D (CITRACAL) 315-250 MG-UNIT TABS Take 1 tablet by mouth every morning       Cholecalciferol (VITAMIN D3 PO) Take 2,000 Units by mouth every morning       doxepin (SINEQUAN) 10 MG/ML (HIGH CONC) solution Take 0.3 mLs (3 mg) by mouth at bedtime  Qty: 120 mL, Refills: 0    Comments: Please dispense with a 1 mL syringe. If insurance will not cover, pt will use a Good RX couple code. BIN [id number] [facility] [id number] Group [id number] Member ID [id number]  Associated Diagnoses: Difficulty sleeping      galcanezumab-gnlm (EMGALITY) 120 MG/ML injection Inject 1 mL (120 mg) Subcutaneous every 28 days  Qty: 1 mL, Refills: 11    Associated Diagnoses: Intractable migraine with aura with status migrainosus      LORazepam (ATIVAN) 0.5 MG tablet Take 1-2 tablets (0.5-1 mg) by mouth every 6 hours as needed for anxiety  Qty: 6 tablet, Refills: 0    Associated Diagnoses: Primary malignant neoplasm of breast with metastasis (H)      metoclopramide (REGLAN) 5 MG tablet Take 1-2 tablets (5-10 mg) by mouth every 6 hours as needed (nausea/vomiting/migraine)  Qty: 20 tablet, Refills: 5    Associated Diagnoses: Intractable migraine with aura with status migrainosus      morphine (MSIR) 15 MG IR tablet Take 1-2 tablets (15-30 mg) by mouth every 3 hours as needed for pain  Qty: 60 tablet, Refills: 0    Associated Diagnoses: Cancer associated pain      OLANZapine (ZYPREXA) 5 MG tablet Take 2.5-5 mg by mouth 2 times daily as needed for nausea      ondansetron (ZOFRAN ODT) 4 MG ODT tab Take 1 tablet (4 mg) by mouth every 8 hours as needed for nausea  Qty: 10 tablet, Refills: 0    Associated Diagnoses: Chemotherapy induced nausea and vomiting      propranolol ER (INDERAL LA) 80 MG 24 hr capsule 80 mg every morning and 160 at night  Qty: 270 capsule, Refills: 3    Associated Diagnoses: Migraine without aura and without status migrainosus, not intractable      ubrogepant (UBRELVY)  100 MG tablet Take 1 tablet (100 mg) by mouth at onset of headache (may repeat in 2 hours as needed. Max 200 mg in 24 hours.)  Qty: 16 tablet, Refills: 11    Associated Diagnoses: Migraine without aura and without status migrainosus, not intractable      venlafaxine (EFFEXOR XR) 75 MG 24 hr capsule Take 3 capsules (225 mg) by mouth daily  Qty: 270 capsule, Refills: 3    Associated Diagnoses: Anxiety; Major depressive disorder, recurrent episode, moderate (H)      vitamin B-2 (RIBOFLAVIN) 25 MG TABS tablet Take 1 tablet by mouth daily      ZOLMitriptan (ZOMIG) 5 MG nasal spray Spray 1 spray in nostril at onset of headache for migraine May repeat in 2 hours. Max 2 sprays/24 hours.  Qty: 12 each, Refills: 9    Associated Diagnoses: Intractable migraine with aura with status migrainosus       !! - Potential duplicate medications found. Please discuss with provider.        Allergies   Allergies   Allergen Reactions    Bactrim [Sulfamethoxazole-Trimethoprim] Other (See Comments)     Internal bleeding    Copaxone [Glatiramer] Hives     Data   Most Recent 3 CBC's:  Recent Labs   Lab Test 03/14/24  1359 03/13/24  0856 03/06/24  1251   WBC 38.5* 41.5* 9.3   HGB 10.1* 12.6 11.4*   MCV 84 84 86    197 192      Most Recent 3 BMP's:  Recent Labs   Lab Test 03/15/24  0506 03/13/24  0856 03/06/24  1251 03/01/24  1122 02/07/24  1005   NA  --  137 139  --  139   POTASSIUM 2.6* 3.1* 3.8  --  3.7   CHLORIDE  --  91* 101  --  102   CO2  --  24 29  --  28   BUN  --  10.8 6.5*  --  6.6*   CR  --  0.83 0.73  --  0.75   ANIONGAP  --  22* 9  --  9   RAFAELA  --  9.8 8.9  --  8.6*   GLC  --  168* 99 72 107*     Most Recent 2 LFT's:  Recent Labs   Lab Test 03/13/24  0856 03/06/24  1251   AST 31 29   ALT 12 12   ALKPHOS 225* 101   BILITOTAL 0.5 0.2     Most Recent INR's and Anticoagulation Dosing History:  Anticoagulation Dose History          Latest Ref Rng & Units 8/30/2019 9/4/2019 9/24/2020 2/25/2021 7/31/2023   Recent Dosing and Labs    INR 0.85 - 1.15 1.09  1.04  1.05  - 1.20    ISTAT INR 0.86 - 1.14 - - - 1.1        This test is intended for monitoring Coumadin therapy.  Results are not   accurate in patients with prolonged INR due to factor deficiency.   -     Most Recent 3 Troponin's:No lab results found.  Most Recent Cholesterol Panel:  Recent Labs   Lab Test 06/10/22  1010   CHOL 225*   *   HDL 41*   TRIG 193*     Most Recent 6 Bacteria Isolates From Any Culture (See EPIC Reports for Culture Details):  Recent Labs   Lab Test 12/15/20  1158 12/15/20  0920 09/05/19  1628 08/31/19  1340   CULT No growth No growth Culture negative for acid fast bacilli  Assayed at [facility] [address] [place] [id number] [phone]  No growth after 4 weeks  Culture negative after 4 weeks  Light growth  Mixed respiratory thalia  *  Light growth  Actinomyces odontolyticus  Susceptibility testing not routinely done  *  This organism is part of normal thalia, but on occasion, may be a true pathogen. Clinical   correlation must be applied to interpreting this microbiology result.    Light growth  Normal respiratory thalia   Heavy growth  Normal thalia       Most Recent TSH, T4 and A1c Labs:  Recent Labs   Lab Test 06/10/22  1010   A1C 5.6       Results for orders placed or performed during the hospital encounter of 03/13/24   MR Brain w/o & w Contrast    Narrative    EXAM: MR BRAIN W/O and W CONTRAST  LOCATION: [facility]  DATE: 3/13/2024    INDICATION: Eval of possible intracranial mets, subarachnoid hemorrhage  COMPARISON:  Same day CT head, MRI brain 07/06/2023, PET/CT 8/8/2023 and 3/1/2024.  CONTRAST: 5 mL Gadavist  TECHNIQUE: Routine multiplanar multisequence head MRI without and with intravenous contrast.    FINDINGS:  INTRACRANIAL CONTENTS: No acute or subacute infarct.  Trace right parietal and occipital subarachnoid hemorrhage. No mass effect or midline shift.  Similar appearance of white matter lesions  compared to 07/06/2021 in a patient with a history of multiple   sclerosis. Mild generalized cerebral atrophy. No hydrocephalus. Normal position of the cerebellar tonsils. No pathologic contrast enhancement.    SELLA: No abnormality accounting for technique.    OSSEOUS STRUCTURES/SOFT TISSUES: Diffusely heterogeneous in a patient with osseous metastases. The major intracranial vascular flow voids are maintained.     ORBITS: Prior bilateral cataract surgery. Visualized portions of the orbits are otherwise unremarkable.     SINUSES/MASTOIDS:  Paranasal sinus mucosal thickening in a right ostiomeatal unit pattern of obstruction. No middle ear or mastoid effusion.       Impression    IMPRESSION:  1.  Similar trace right parieto-occipital subarachnoid hemorrhage.  2.  Known osseous metastases. No intracranial/parenchymal metastasis.   XR Chest Port 1 View    Narrative    CHEST PORTABLE ONE VIEW March 14, 2024 1:30 PM       INDICATION: New fever.    COMPARISON: 3/13/2024.       Impression    IMPRESSION: Left subclavian port and multiple bilateral surgical clips  are stable. Old bilateral rib fractures again noted. The lungs are  clear.    LOVE MALDONADO MD         SYSTEM ID:  [id number]     *Note: Due to a large number of results and/or encounters for the requested time period, some results have not been displayed. A complete set of results can be found in Results Review.

## 2024-03-15 NOTE — PROGRESS NOTES
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  Her  called 911, and the patient had a second seizure while with the EMTs.      Patient has no history of seizures in the past.  Has been on anticoagulation for DVT management.  Has a history of breast cancer with metastases in the periosteal bones.     While in the hospital, patient has been stable, slightly more somnolent than usual.  Imaging has shown trace right parietal occipital subarachnoid hemorrhage, along with revisualization of Previously known osseous metastases.     In the emergency room, patient was loaded with 1 g of Keppra, followed by continuation of 500 mg twice daily of Keppra.      Interval history and investigations   Continues to do well.  No recurrence of seizures.  No side effects with Keppra.  No major updates on chart review.        Examination   Nonfocal neurological examination.    No nuchal rigidity, photophobia.  Motor examination equitable, reflexes symmetrical.  Toes downgoing.  Gait slightly ataxic, but otherwise normal.      ASSESSMENT     New onset seizure disorder.  On Keppra 500 mg twice daily.       RECOMMENDATIONS   Continue Keppra 500 mg twice daily.  I do not see indications for performing EEG examination at the moment.  Please cancel order.  Follow neurosurgery guidance on timeline of follow-up imaging, timeline of admission versus discharge, and timeline of resumption of anticoagulation in terms of subarachnoid hemorrhage.     Follow-up: Inpatient neurology will sign off, please recontact if questions emerge.      I have discussed the above details with Ms. Alvarez and the family at great length and they expressed  understanding.  They seemed satisfied with this conversation, and had no further queries as of now.  she has our contact information and has been advised to stay in touch with us regarding any other issues that may arise.     Thank you for allowing me to participate in Ms. Alvarez's care.       Penny Perez MD   Neurologist, [facility]   March 15, 2024 8:29 AM      A total time of 38 minutes was spent on the care of this patient on the date of the visit, outside of time spent performing any procedures. Please excuse any typographical errors, as this note was generated using a Voice Recognition Software.

## 2024-03-15 NOTE — CONSULTS
CLINICAL NUTRITION SERVICES  -  ASSESSMENT NOTE    Recommendations Ordered by Registered Dietitian (RD):   - Encourage adequate intake including meals TID + ensure supplements BID     MALNUTRITION:  % Weight Loss:  > 7.5% in 3 months -- 14.5% in 3 months, in two weeks pt has lost > 5% of body wt.  % Intake:  Decreased intake does not meet criteria for malnutrition   Subcutaneous Fat Loss:  global mild-moderate fat losses   Muscle Loss:  global moderate muscle losses.   Fluid Retention:  None noted    Malnutrition Diagnosis: Moderate malnutrition  In Context of:  Chronic illness or disease        REASON FOR ASSESSMENT  Shaneka Alvarez is a 63 year old female seen by Registered Dietitian for +MST.     Shaneka Alvarez admitted for seizure at home.   Imaging has shown trace right parietal occipital subarachnoid hemorrhage, along with revisualization of previously known osseous metastases.    PMH: met breast cancer (initial dx 2015) - receiving chemo, MS, DVTs  - following outpatient palliative medicine for chronic appetite suppression and nausea management     NUTRITION HISTORY  Information obtained from pt and family member.   - per chart review: poor intake, chronic poor appetite. Pt has also been seen by an oncology RD in 8/22/2023. Pt had been struggling with intake and poor appetite at that time d/t taste/texture aversions (especially to meat). Pt also reported drinking premier proteins shakes 2-3x/week; she would also use protein shakes or smoothies in place of a meal if her appetite was poor. RD provided education on high calorie/high protein diet, alternative sources of protein, discussed methods to cope with taste aversion (Miracle harper), as well as methods to boost calories/protein in foods (olive oil, powered milk).   - Per pt report: she reports losing weight due to her cancer. She has chronic poor appetite but tries her best with eating. She will usually have 1 ensure/boost per day. Pt had no questions  "regarding her diet/nutrition.       CURRENT NUTRITION ORDERS  Diet Order:  Regular, ensure clear between meals    Current Intake/Tolerance:   - Per [facility]: pt ordered 2 meals yesterday and breakfast today   - Per Flowsheet: 75% and 25% intakes noted  - Per pt report: eating is going well, appetite is at baseline.   - RDN d/w pt ONS. Pt states she has been liking the ensure clears and would like to continue with them.     Food Allergies/Intolerances: NKFA    Labs: reviewed; K 2.6 (L) -- replacement noted    Medications: reviewed; potassium chloride    Skin: no edema or wounds noted     I/Os: last BM 3/12, 1 emesis noted yesterday       ANTHROPOMETRICS  Height: 5' 2.008\"  Weight: 106 lbs 9.6 oz  Body mass index is 19.49 kg/m .  IBW: 50 kg  %IBW: 97%  Weight History:   Wt Readings from Last 10 Encounters:   03/15/24 48.4 kg (106 lb 9.6 oz)   03/06/24 51.7 kg (114 lb)   03/05/24 49.9 kg (110 lb)   03/01/24 50.8 kg (112 lb)   02/07/24 54 kg (119 lb)   01/10/24 54.2 kg (119 lb 6.4 oz)   01/09/24 54.4 kg (120 lb)   01/03/24 54.4 kg (120 lb)   12/13/23 56.5 kg (124 lb 8 oz)   12/05/23 53.5 kg (118 lb)    - per chart review, pt has lost 14.5% wt from December 2023.   - In two weeks pt has lost > 5% of body wt.       ASSESSED NUTRITION NEEDS PER APPROVED PRACTICE GUIDELINES:  Dosing Weight 48.4 kg (admit wt)   Estimated Energy Needs: 9599-8539 kcals (30-35 Kcal/Kg)  Justification: repletion and hypercatabolism with critical illness  Estimated Protein Needs: 58-73 grams protein (1.2-1.5 g pro/Kg)  Justification: Repletion and hypercatabolism with critical illness  Estimated Fluid Needs: 1 mL/Kcal  Justification: maintenance OR per provider pending fluid status      NUTRITION DIAGNOSIS:  Inadequate oral intake related to chronic poor appetite, taste/texture aversions as evidenced by pt report of decreased intake, significant wt loss, global muscle and fat losses.       NUTRITION INTERVENTIONS  Recommendations / Nutrition " Prescription  See above.     Implementation  Nutrition education: reviewed education previously received in August 2023.   Medical food supplement - continue       Nutrition Goals  Consume % of meals and/or supplements TID   Maintain wt >48 kg      MONITORING AND EVALUATION:  Progress towards goals will be monitored and evaluated per protocol and Practice Guidelines      Judy Higuera MS, RD, LD  Clinical Dietitian  Pager: [phone]

## 2024-03-15 NOTE — PROVIDER NOTIFICATION
Brief update:    Added PRN oxycodone for bone pain related to cancer.    Johnny Spicer MD  12:38 AM    Paged re: UA resulted.    Mildly abnormal w/ pyuria (6 wbcs), no nitrates, positive ketones.     Not overly convincing for infection, and unclear if pt w/ any symptoms other than noted fever. Will defer any change in management on this to rounding team.    Johnny Spicer MD  5:03 AM    Hypokalemia this AM    Appears that protocol was ordered for prior hypoK, but held up as no documented weight.     Weigh patient, if this does not trigger the K protocol, call for new order.    Johnny Spicer MD  5:46 AM

## 2024-03-15 NOTE — PROGRESS NOTES
"Palliative consult received for chronic appetite suppression and nausea management. Pt is followed outpatient by palliative medicine, Dr. Whitaker, last seen 3/6. In communication with Dr. Whitaker and Dr. Bailey, recommendation is for follow up in the clinic after discharge. No need for inpatient consultation. Will cancel consult at this time. Thanks.    Venice WHITLEY, JONATHAN  Palliative Medicine   [facility]  Securely message with [employer]   *If you are having trouble locating my name, please ensure \"global\" is checked under contacts tab, within the sites button    "

## 2024-03-15 NOTE — PLAN OF CARE
Sybil Cowart RN on 3/15/2024 at 6:00 AM    Reason for Admission: Seizure, SAH  Cognitive/Mentation: A/Ox 4  Neuros/CMS: Intact   VS: VSS on RA. SBP < 140    GI: BS audible, + flatus, Continent.  :  Continent.  Pulmonary: LS clear.  Pain: denies.   Drains/Lines: L chest port SL  Skin: intact  Activity: SBA    Diet: Reg with thin liquids. Takes pills whole. Poor intake.     Therapies recs: pending  Discharge: pending  Aggression Stoplight Tool: Green  End of shift summary: UA results came back and MD notified.

## 2024-03-15 NOTE — PROGRESS NOTES
Pt here with seizure and SAH. A&O x4. Neuros intact. VSS. Regular diet, thin liquids. Takes pills whole. SBA. Denies pain. Pt scoring green on the Aggression Stop Light Tool. Mei curry. Discharge home via daughter.

## 2024-03-15 NOTE — PROGRESS NOTES
Chart check    Metastatic breast cancer  No new input from oncology-please see Dr. Bailey's note from 03/13/2024    Follows with Dr. Zepeda currently on Doxil  Schedule follow-up appointment with Dr. Zepeda After discharge    Intracranial  hemorrhage  Patient with PE history holding Eliquis   Timing of resumption of Eliquis per neurosurgery recommendation    Leukocytosis from Neulasta        I sent staff message to Dr. Zepeda patient's primary oncologist     Oncology will sign off please call with questions      GUTIERREZ Weaver CNP  [facility]

## 2024-03-15 NOTE — PLAN OF CARE
Date/Time: 3/17  Summary: Seizure, SAH  Precautions: Bleeding, fall, seizure  Cognitive Concerns/Orientation: A&Ox4  Behavior and Aggression Color: Calm & cooperative  ABNL VS/O2: SBP elevated, Propranolol restarted, RA  CMS: Intact  Edema: -  Mobility: SBA  Pain Management: Chronic back pain; Oxy prn (refusing Tylenol), & scheduled pain meds  Diet: Regular  Bowel/Bladder: WDL  ABNL Labs/BG: K 2.6; replaced am. Timed redraw late due to port access; K lab drawn (results pending.)  Drain/Devices: Portacath, de-access orders for d/c  Telemetry Rhythm: Keep SBP <140  Skin: WDL's  Tests/Procedures: -  Discharge Date: After K lab resulted, notify MD. Discharge depending on lab value.  Other Info: **waiting for K lab results

## 2024-03-17 NOTE — PROGRESS NOTES
Clinic Care Coordination Contact  [facility]: Post-Discharge Note  SITUATION                                                      Admission:    Admission Date: 03/13/24   Reason for Admission: seizure  Discharge:   Discharge Date: 03/15/24  Discharge Diagnosis: New onset seizure   Trace right parietal and occipital subarachnoid hemorrhage  Metastatic Breast Cancer   Leukocytosis  Hx of PE  Hypokalemia    BACKGROUND                                                      Per hospital discharge summary and inpatient provider notes:  Shaneka Alvarez is a 63 year old female who presented to an outside ED after a witnessed seizure. Pt apparently had seizure at home and then had another seizure after EMS arrived. She does not recall any of this. Currently she feels ok. She denies headache, pain, shortness of breath, fevers, chills, abdominal pain, cough, diarrhea.         ASSESSMENT           Discharge Assessment  How are you doing now that you are home?: better but not all that great  How are your symptoms? (Red Flag symptoms escalate to triage hotline per guidelines): Improved  Do you feel your condition is stable enough to be safe at home until your provider visit?: Yes  Does the patient have their discharge instructions? : Yes  Does the patient have questions regarding their discharge instructions? : No  Were you started on any new medications or were there changes to any of your previous medications? : Yes  Does the patient have all of their medications?: No (see comment) (will  soon)  Do you have questions regarding any of your medications? : No  Do you have all of your needed medical supplies or equipment (DME)?  (i.e. oxygen tank, CPAP, cane, etc.): Yes  Discharge follow-up appointment scheduled within 14 calendar days? : Yes                PLAN                                                      Outpatient Plan:      Follow up with primary care provider, [facility], within 7 days for  hospital follow- up.    Neurosurgery in 2 weeks with a head CT prior  Neurology in 4 weeks       Future Appointments   Date Time Provider Department Center   3[facility]29[facility]2024 11:00 AM [facility]   4/1/2024 10:00 AM Jes Kang, TAY [facility]   4/1/2024  1:00 PM [facility] FAST TRACK LAB [facility] [place]   4/1/2024  1:30 PM [facility] [place]   4/1/2024  2:30 PM MG BAY 7 INFUSION [facility] [place]   4/30/2024 11:45 AM [facility] CAN[facility] FAST TRACK LAB [facility] [place]   4/30/2024 12:30 PM [facility] [place]   4/30/2024  1:00 PM MG BAY 3 INFUSION CI [place]   5/29/2024  8:45 AM MG CANCER FAST TRACK LAB CI [place]   5/29/2024  9:30 AM Dewayne Zepeda MD [facility]   5/29/2024 10:00 AM MG BAY 11 INFUSION CI [place]   6/5/2024  2:30 PM Alva Whitaker MD [facility]   3/5/2025 12:30 PM Emelia Mixon APRN CNP [facility]         For any urgent concerns, please contact our 24 hour nurse triage line: [phone])         KIRBY Hammer

## 2024-03-19 NOTE — TELEPHONE ENCOUNTER
Still getting an error through insurance        Will wait until closer to the end of the week to resubmit.    Insurance may take up to 14 days to be reinstate

## 2024-03-19 NOTE — TELEPHONE ENCOUNTER
Still getting an error through insurance        Will wait until closer to the end of the week to resubmit.    Insurance may take up to 14 days to be reinstated.

## 2024-03-22 NOTE — TELEPHONE ENCOUNTER
Checked again today, this patient's pharmacy benefits still has not been reinstated.  Keeps coming up as inactive.     Will try again on Monday 3/25

## 2024-03-25 NOTE — TELEPHONE ENCOUNTER
Received [employer] message from patient requesting refill of Belbuca.     Last refill: 2/18/24  Last office visit: 3/6/24  Scheduled for follow up 6/5/24     Will route request to MD for review.     Reviewed [place]  Report.

## 2024-03-25 NOTE — TELEPHONE ENCOUNTER
Insurance still not active, tried again today 3/25.     Per  pharmacy in [place] they filled the Eliquis recently but it was with a [employer] copay card.

## 2024-03-25 NOTE — TELEPHONE ENCOUNTER
Insurance still not active, tried again today 3/25.     Per  pharmacy in [place] they filled the Eliquis recently but it was with a [employer] copay card.

## 2024-03-25 NOTE — TELEPHONE ENCOUNTER
Insurance still not active, tried again today 3/25.     Per  pharmacy in [place] they filled the Eliquis recently but it was with a [employer] copay card.

## 2024-03-26 NOTE — TELEPHONE ENCOUNTER
Central Prior Authorization Team   Phone: [phone]    PA Initiation    Medication: Emgality 120mg  Insurance Company: [employer] ([facility]) - Phone [phone] Fax [phone]  Pharmacy Filling the Rx: [facility] [facility] [address] [facility]  Filling Pharmacy Phone: [phone]  Filling Pharmacy Fax:    Start Date: 3/26/2024

## 2024-03-26 NOTE — TELEPHONE ENCOUNTER
Central Prior Authorization Team   Phone: [phone]    PA Initiation    Medication: ZOLMitriptan (ZOMIG) 5 MG nasal spray  Insurance Company: [employer] ([facility]) - Phone [phone] Fax [phone]  Pharmacy Filling the Rx: [facility] [address] [facility] [address] [facility]  Filling Pharmacy Phone: [phone]  Filling Pharmacy Fax:    Start Date: 3/26/2024

## 2024-03-26 NOTE — TELEPHONE ENCOUNTER
Central Prior Authorization Team   Phone: [phone]    PA Initiation    Medication: Ubrelvy 100mg  Insurance Company: [employer] ([facility]) - Phone [phone] Fax [phone]  Pharmacy Filling the Rx: [facility] [address] [place] [address] [facility]  Filling Pharmacy Phone: [phone]  Filling Pharmacy Fax:    Start Date: 3/26/2024

## 2024-03-27 NOTE — TELEPHONE ENCOUNTER
Per call to insurance-    Brand Zomig 5mg nasal spray is covered under patient's plan.  Called pharmacy to run as brand Zomig

## 2024-03-27 NOTE — TELEPHONE ENCOUNTER
PRIOR AUTHORIZATION DENIED    Medication: ZOLMitriptan (ZOMIG) 5 MG nasal spray    Denial Date: 3/27/2024    Denial Rational: Plan exclusion             Appeal Information: This medication was denied. If physician would like to appeal because patient has contraindication or allergy to covered medication please write letter of medical necessity and route back to PA team to initiate.  If no further action is needed please close encounter thank you.

## 2024-03-27 NOTE — TELEPHONE ENCOUNTER
Prior Authorization Not Needed per Insurance    Medication: Zomig brand 5mg nasal spray   Insurance Company: [employer] ([facility]) - Phone [phone] Fax [phone]  Expected CoPay:      Pharmacy Filling the Rx: [facility] [facility] [address] [facility]  Pharmacy Notified:  yes  Patient Notified:  yes- Pharmacy will contact patient when ready to /ship      Brand Zomig is covered under plan. Pharmacy was called and made aware.

## 2024-03-27 NOTE — TELEPHONE ENCOUNTER
[facility] please advise-    [facility] pharmacy was called [phone]  and made aware to process the Rx as brand Zomig.     The technician stated something went wrong on their end trying to reprocess the Rx sent on 3/1/2024.  They are unable to retrieve it in their system and would like the clinic to resend a new Rx for the Zolmitriptan.       They do not need to have this sent as brand Zomig per tech.  They are aware to reprocess as brand but just need a new Rx like the one sent on 3/1.

## 2024-03-27 NOTE — TELEPHONE ENCOUNTER
Prior Authorization Approval    Authorization Effective Date: 3/26/2024  Authorization Expiration Date: 3/26/2025  Medication: Emgality 120mg  Approved Dose/Quantity:   Reference #:     Insurance Company: [employer] ([place]) - Phone [phone] Fax [phone]  Expected CoPay:       CoPay Card Available:      Foundation Assistance Needed:    Which Pharmacy is filling the prescription (Not needed for infusion/clinic administered): [facility] [address] [place] [address] [facility]  Pharmacy Notified:  yes  Patient Notified:  yes- Pharmacy will contact patient when ready to /ship

## 2024-03-27 NOTE — TELEPHONE ENCOUNTER
Prior Authorization Approval    Authorization Effective Date: 3/26/2024  Authorization Expiration Date: 3/26/2025  Medication: Ubrelvy 100mg  Approved Dose/Quantity:   Reference #:     Insurance Company: [employer]umRALEJANDRO ([facility]) - Phone [phone] Fax [phone]  Expected CoPay:       CoPay Card Available:      Foundation Assistance Needed:    Which Pharmacy is filling the prescription (Not needed for infusion/clinic administered): [facility] [address] [place] [address] [facility]  Pharmacy Notified:  yes  Patient Notified:  yes- Pharmacy will contact patient when ready to /ship

## 2024-03-27 NOTE — TELEPHONE ENCOUNTER
Central Prior Authorization Team   Phone: [phone]    PA Initiation    Medication: Zomig brand 5mg nasal spray   Insurance Company: [employer] ([facility]) - Phone [phone] Fax [phone]  Pharmacy Filling the Rx: [facility] [address] [place] [address] [facility]  Filling Pharmacy Phone: [phone]  Filling Pharmacy Fax:    Start Date: 3/27/2024

## 2024-03-28 NOTE — PROGRESS NOTES
Oncology Follow Up Visit: April 1, 2024    Oncologist: Dr. Dewayne Zepdea  PCP: [facility]    Reason for Visit: Follow-up breast cancer     Diagnosis:   Metastatic breast cancer negative breast cancer (androgen receptor positive ) with extensive involvement of the bones.  There is also a left lower lobe hypermetabolic lesion and mediastinal lymph nodes which are hypermetabolic.  The biopsy from the right iliac bone is consistent with metastatic lobular breast cancer but it is hormone receptor and HER-2 negative and PDL 1 is also negative. Foundation 1 testing did not reveal any actionable mutations.    She was intolerant to Xeloda and progressed on Taxol and eribulin.  She was switched to FDA approved sacituzumab govitecan-hziy (Trodelvy) for TNBC, based on the results of the phase II IMMU-132-01 clinical trial. She started this on 11/11/2020. After 10 cycles of Trodelvy, she had PET/CT on 6/11/2021 which showed mildly increased uptake in some of the bone lesions while stability in other bone lesions. After 15 cycles, repeat PET scan showed stable findings.  CA 27-29 has been increasing and it is 1176.       Then she had clear progression of the disease in the bones with increased FDG uptake in both right and left iliac bones and the sternum.    Foundation one showed CDH1 mutation (initially lobular cancer), CCND1 amplification ( equivocal ). FGF3, FGF4, FGF19 amplification ( Equivocal ). Most promising is CCND1 amplification with abemaciclib showing response in 4/10 patients. FGF3,FGF4 and FGF19 are targetable with pan-FGFR inhibitor. As the tumor is diffusely positive for androgen receptor, we decided to start her on androgen receptor blockers.     Started on Casodex 150 mg daily instead of Enzalutamide due to cost issues on 1/6/2022.   She had progressive disease in the bones on the PET scan on 3/31/2022 - changed to single agent Doxil on 4/14/2022.  20% dose-reduced 5/13/2022 for significant nausea  and vomiting.       Repeat PET/CT on 7/1/2022 shows resolution of the FDG avid bony metastasis consistent with treated disease.  No suspicious FDG uptake was seen in the chest abdomen and pelvis. CA 27-29 was also coming down.     CA 15-3 has been slowly rising. Repeat PET/CT on 8/8/2023 showed pretty stable findings. She has been having more pain in the lumbar spine/low back. As the progression has been very slow and mild and overall she has been tolerating chemotherapy well, decision made to continue Doxil with Onpro.      Interval History:  Pt is present in clinic today with her son. She was admitted in the hospital a few weeks ago for new onset seizure. She states she fell and doesn't remember anything after that. She had a follow-up with neurosurgery after a brain CT with reassuring results. She is otherwise feeling okay. She mentions she continues to have unsteadiness with position changes, she gets dizzy. She has fallen a few times. She has worked with PT previously but stopped d/t cost. She also mentions severe fatigue, her sleeping cycle is variable, some days she has a hard time staying asleep and other times she can't fall asleep. She admits to screen time before bed. Her appetite is also poor, she is please to see her weight is up 6 lbs today. She has worked with the nutritionist in the past. Sometimes it is associated with nausea. She has zofran available. She also has issues with constipation and is using senna, which is helpful. Overall she states her QOL is very good and she wants to continue with treatment as long as she can tolerate it. No additional questions or concerns today.    Patient denies any of the following except if noted above: fevers, chills, vision or hearing changes, chest pain, dyspnea, abdominal pain, nausea, vomiting, diarrhea, urinary concerns, headaches, numbness, tingling, issues with sleep or mood. She also denies lumps, bumps, rashes or skin lesions, bleeding or bruising  issues.    Physical Exam:  /68   Pulse 68   Resp 16   Wt 50.9 kg (112 lb 3.2 oz)   SpO2 99%   BMI 20.52 kg/m     BP Readings from Last 6 Encounters:   04/01/24 130/68   04/01/24 120/80   03/15/24 (!) 152/99   03/13/24 128/88   03/06/24 (!) 166/114   02/07/24 (!) 158/99     Wt Readings from Last 6 Encounters:   04/01/24 50.9 kg (112 lb 3.2 oz)   04/01/24 50.8 kg (112 lb)   03/15/24 48.4 kg (106 lb 9.6 oz)   03/06/24 51.7 kg (114 lb)   03/05/24 49.9 kg (110 lb)   03/01/24 50.8 kg (112 lb)      Constitutional: No acute distress, pleasant, appropriately groomed.   ENT: PERRLA, sclera without erythema. Appropriate dentition, no mouth sores.  Neck: Trachea midline, no adenopathy.   Resp: CTA, adequate depth and rate of respirations.   Cardiac: S1/S2, RRR, no murmurs.   Abdomen: BS active, abdomen soft and non-tender. No masses or organomegaly.   MS:  5/5 muscle strength, adequate ROM.   Skin: No rashes, lesions, or wounds on exposed skin.  Neuro: A/O x 4, sensation intact.   Lymph: No palpable anterior/posterior cervical, axillary, or supraclavicular nodes.   Psych: Appropriate mentation and affect.    Laboratory Results:   Results for orders placed or performed in visit on 04/01/24   Comprehensive metabolic panel     Status: Abnormal   Result Value Ref Range    Sodium 138 135 - 145 mmol/L    Potassium 3.6 3.4 - 5.3 mmol/L    Carbon Dioxide (CO2) 32 (H) 22 - 29 mmol/L    Anion Gap 9 7 - 15 mmol/L    Urea Nitrogen 10.0 8.0 - 23.0 mg/dL    Creatinine 0.75 0.51 - 0.95 mg/dL    GFR Estimate 90 >60 mL/min/1.73m2    Calcium 8.9 8.8 - 10.2 mg/dL    Chloride 97 (L) 98 - 107 mmol/L    Glucose 122 (H) 70 - 99 mg/dL    Alkaline Phosphatase 96 40 - 150 U/L    AST 25 0 - 45 U/L    ALT 11 0 - 50 U/L    Protein Total 5.9 (L) 6.4 - 8.3 g/dL    Albumin 3.6 3.5 - 5.2 g/dL    Bilirubin Total 0.2 <=1.2 mg/dL   CBC with platelets and differential     Status: Abnormal   Result Value Ref Range    WBC Count 7.8 4.0 - 11.0 10e3/uL     RBC Count 3.65 (L) 3.80 - 5.20 10e6/uL    Hemoglobin 9.9 (L) 11.7 - 15.7 g/dL    Hematocrit 31.9 (L) 35.0 - 47.0 %    MCV 87 78 - 100 fL    MCH 27.1 26.5 - 33.0 pg    MCHC 31.0 (L) 31.5 - 36.5 g/dL    RDW 16.1 (H) 10.0 - 15.0 %    Platelet Count 168 150 - 450 10e3/uL    % Neutrophils 76 %    % Lymphocytes 8 %    % Monocytes 13 %    % Eosinophils 1 %    % Basophils 1 %    % Immature Granulocytes 1 %    NRBCs per 100 WBC 0 <1 /100    Absolute Neutrophils 6.0 1.6 - 8.3 10e3/uL    Absolute Lymphocytes 0.6 (L) 0.8 - 5.3 10e3/uL    Absolute Monocytes 1.0 0.0 - 1.3 10e3/uL    Absolute Eosinophils 0.1 0.0 - 0.7 10e3/uL    Absolute Basophils 0.1 0.0 - 0.2 10e3/uL    Absolute Immature Granulocytes 0.1 <=0.4 10e3/uL    Absolute NRBCs 0.0 10e3/uL   CBC with platelets differential     Status: Abnormal    Narrative    The following orders were created for panel order CBC with platelets differential.  Procedure                               Abnormality         Status                     ---------                               -----------         ------                     CBC with platelets and d...[[id number]]  Abnormal            Final result                 Please view results for these tests on the individual orders.     I reviewed the above labs today.    Imaging:  CT Head w/o contrast*  Narrative: CT OF THE HEAD WITHOUT CONTRAST 3/29/2024 10:59 AM     COMPARISON: Brain MRI 3/13/2024.    HISTORY: SAH; Subarachnoid hemorrhage (H).    TECHNIQUE: 5 mm thick axial CT images of the head were acquired  without IV contrast material.    FINDINGS: There is mild diffuse cerebral volume loss. There are subtle  patchy areas of decreased density in the cerebral white matter  bilaterally that are consistent with sequela of chronic small vessel  ischemic disease.    The ventricles and basal cisterns are within normal limits in  configuration given the degree of cerebral volume loss.  There is no  midline shift. There are no extra-axial fluid  collections.    No intracranial hemorrhage, mass or recent infarct.    The visualized paranasal sinuses are well-aerated. There is no  mastoiditis. There are no fractures of the visualized bones.  Impression: IMPRESSION: Diffuse cerebral volume loss and cerebral white matter  changes consistent with chronic small vessel ischemic disease. No  evidence for acute intracranial pathology. This represents interval  resolution of the small volume right occipital subarachnoid hemorrhage  since the comparison study.    Radiation dose for this scan was reduced using automated exposure  control, adjustment of the mA and/or kV according to patient size, or  iterative reconstruction technique    LAMONT JACKSON MD         SYSTEM ID:  [id number]    I reviewed the above imaging report today.        Assessment and Plan:   Metastatic breast cancer   - On treatment with Doxil and continues to tolerate treatment well with a good QOL.   - Slow and mild progression based on PET/CT imaging on 3/1 and rising CA 15-3    - Considering chemotherapy with carboplatin or gemcitabine in the future  - Due for C26 today, exam and labs reviewed - continues to meet treatment parameters, can proceed with treatment    New onset seizure  - Admitted from 3/13/24 to 3/15/24 after witnessed seizure  - Brain MRI w/trace right parietal-occipital subarachnoid hemorrhage, known osseous metastasis, no intracranial/parenchymal metastasis   - Now on Keppra 500 mg BID  - Follow up CT on 3/29 with interval resolution of SAH, neurosurgery signed off   - No concerning neurological s/s today  - Neurology following     Chemo-induced neutropenia  - Continue Onpro support  - ANC 6.0  - Continue to monitor     Mild anemia  - s/t chemotherapy  - Mild, Hgb 9.9  - Continue to monitor     Metastatic bone disease  - Received palliative radiation to T12 - L5 3000 cGy in 10 fractions in 2019  - Switched to Xgeva in 2020    Cancer-related pain   - Palliative care following  -  Using buprenorphine buccal film to 600 mcg in the morning and 450 mcg in the evening   - Morphine PRN, using occasionally; using senna appropriately     Bilateral PE  - On Eliquis BID - held recently d/t new onset seizure and SAH, advised today by neurosurgery to resume  - No s/s of bleeding     Chemo-induced nausea  - Hx of migraines and constipation, which are also side effects of zofran  - Will provide prescription for compjose WHITLEY, CNP

## 2024-04-01 NOTE — PATIENT INSTRUCTIONS
Follow-up with Neurology for  seizure and Keppra management    Follow-up with primary care provider for Eliquis management     Call our clinic with any questions or concerns

## 2024-04-01 NOTE — NURSING NOTE
"Shaneka Alvarez is a 63 year old female who presents for:  Chief Complaint   Patient presents with    Neurologic Problem     Subarachnoid hemorrhage         Initial Vitals:  Temp 97.4  F (36.3  C) (Temporal)   Wt 112 lb (50.8 kg)   BMI 20.48 kg/m   Estimated body mass index is 20.48 kg/m  as calculated from the following:    Height as of 3/15/24: 5' 2.01\" (1.575 m).    Weight as of this encounter: 112 lb (50.8 kg).. Body surface area is 1.49 meters squared. BP completed using cuff size: regular  No Pain (0)    Nursing Comments:     Sandi Armstrong    "

## 2024-04-01 NOTE — NURSING NOTE
"Oncology Rooming Note    April 1, 2024 1:19 PM   Shaneka Alvarez is a 63 year old female who presents for:    Chief Complaint   Patient presents with    Oncology Clinic Visit     Initial Vitals: /68   Pulse 68   Resp 16   Wt 50.9 kg (112 lb 3.2 oz)   SpO2 99%   BMI 20.52 kg/m   Estimated body mass index is 20.52 kg/m  as calculated from the following:    Height as of 3/15/24: 1.575 m (5' 2.01\").    Weight as of this encounter: 50.9 kg (112 lb 3.2 oz). Body surface area is 1.49 meters squared.  Data Unavailable Comment: Data Unavailable   No LMP recorded. Patient is postmenopausal.  Allergies reviewed: Yes  Medications reviewed: Yes    Medications: Medication refills not needed today.  Pharmacy name entered into [facility]:    [facility] - [place] - [address] [facility] - [place], [place] - [address] [facility] - [address]  [facility] [place] [address]    Frailty Screening:   Is the patient here for a new oncology consult visit in cancer care? 2. No      Clinical concerns: No Concerns        Jorge Alberto Brar MA            "

## 2024-04-01 NOTE — PROGRESS NOTES
Infusion Nursing Note:  Shaneka Alvarez presents today for C26D1 Doxil/onpro & Xgeva.    Patient seen by provider today: Yes: Jeannie Mixon CNP   present during visit today: Not Applicable.    Note: Patient reports feeling some fatigue lately but overall ok.    ONPRO  Was placed on patient's: right side of abdomen.    Was placed at 4:25 PM    Podpal used: No    ONPRO injector device Lot number: [id number]    Patient education included: what patient can expect after application, what colored lights mean on the device, when to remove device, when and where to call with questions or issues, all patients questions answered, and that Neulasta administration will occur at 1725.    Patient tolerated administration well.  Intravenous Access:  Implanted Port.    Treatment Conditions:  Lab Results   Component Value Date    HGB 9.9 (L) 04/01/2024    WBC 7.8 04/01/2024    ANEU 22.4 (H) 07/27/2023    ANEUTAUTO 6.0 04/01/2024     04/01/2024        Lab Results   Component Value Date     04/01/2024    POTASSIUM 3.6 04/01/2024    MAG 1.8 03/13/2024    CR 0.75 04/01/2024    RAFAELA 8.9 04/01/2024    BILITOTAL 0.2 04/01/2024    ALBUMIN 3.6 04/01/2024    ALT 11 04/01/2024    AST 25 04/01/2024       Results reviewed, labs MET treatment parameters, ok to proceed with treatment.      Post Infusion Assessment:  Patient tolerated infusion without incident.  Patient tolerated injection without incident.  Blood return noted pre and post infusion.  Site patent and intact, free from redness, edema or discomfort.  No evidence of extravasations.  Access discontinued per protocol.       Discharge Plan:   AVS to patient via [facility].  Patient will return 4/30 for next appointment.   Patient discharged in stable condition accompanied by: family.  Departure Mode: Ambulatory.      Alycia Judge RN

## 2024-04-01 NOTE — PROGRESS NOTES
Port accessed with no incident. Blood return noted. Port flushed with saline and heparin. Patient tolerated port flush well. Port was left accessed for infusion to use. Please refer to flow sheets for more information.  Radha Hammond RN on 4/1/2024 at 1:14 PM

## 2024-04-01 NOTE — PROGRESS NOTES
[facility]   Follow Up Visit      HPI: Shaneka Alvarez is a 63 year old female with history of metastatic  breast cancer, MS, prior  PE on Eliquis, who presents for hospital follow-up of subarachnoid hemorrhage. Patient was admitted on 3/13/24 with seizure activity and trace right parieto-occipital subarachnoid hemorrhage. Neurosurgery recommended to hold Eliquis, follow-up in clinic with repeat head CT, and follow-up with Neurology for seizure and Keppra management.     Today, patient reports feeling well. Denies any headaches, dizziness, nausea, vision/speech changes, or other neurological changes. Denies any new symptoms or concerns at this time. She follows with Neurology for chronic migraines but has not yet scheduled a follow-up appt for seizure management. She continues to take Keppra as prescribed.     Past Medical History:   Diagnosis Date    Breast cancer (H)     Age 44    Cataract     left eye    Chemotherapy induced nausea and vomiting 12/15/2020    Common migraine     Esophageal reflux     H/O bilateral mastectomy     Mild major depression (H24)     Multiple sclerosis (H)     Pulmonary embolism (H)          Past Surgical History:   Procedure Laterality Date    CHOLECYSTECTOMY      ENDOBRONCHIAL ULTRASOUND FLEXIBLE N/A 09/05/2019    Procedure: Flexible Bronchoscopy, Endobronchial Ultrasound, Radial Probe;  Surgeon: Sree Sanchez MD;  Location: [facility] OR     REMOVAL OF OVARY/TUBE(S)      HERNIA REPAIR, UMBILICAL      INJECT SACROILIAC JOINT Bilateral 10/13/2023    Procedure: Sacroiliac Joint Injection;  Surgeon: Natalie Yusuf MD;  [place] [facility] [place]ctomy  Bilateral 2006    MASTECTOMY, BILATERAL      PHACOEMULSIFICATION CLEAR CORNEA WITH TORIC INTRAOCULAR LENS IMPLANT Left 2/8/2021    Procedure: PHACOEMULSIFICATION, COMPLEX CATARACT, WITH INTRAOCULAR LENS IMPLANT, RESIDENT TORIC LENS LEFT;  Surgeon: Luis Barkley MD;  [place] [facility] [place]     PHACOEMULSIFICATION CLEAR CORNEA WITH TORIC INTRAOCULAR LENS IMPLANT Right 3/8/2021    Procedure: PHACOEMULSIFICATION, CATARACT, WITH INTRAOCULAR LENS IMPLANT, TORIC LENS RIGHT;  Surgeon: Luis Barkley MD;  Location: [facility] OR       Current Outpatient Medications   Medication    apixaban ANTICOAGULANT (ELIQUIS ANTICOAGULANT) 5 MG tablet    Buprenorphine HCl (BELBUCA) 450 MCG FILM buccal film    Buprenorphine HCl (BELBUCA) 600 MCG FILM buccal film    busPIRone (BUSPAR) 15 MG tablet    calcium citrate-vitamin D (CITRACAL) 315-250 MG-UNIT TABS    Cholecalciferol (VITAMIN D3 PO)    doxepin (SINEQUAN) 10 MG/ML (HIGH CONC) solution    galcanezumab-gnlm (EMGALITY) 120 MG/ML injection    levETIRAcetam (KEPPRA) 500 MG tablet    LORazepam (ATIVAN) 0.5 MG tablet    metoclopramide (REGLAN) 5 MG tablet    morphine (MSIR) 15 MG IR tablet    OLANZapine (ZYPREXA) 5 MG tablet    ondansetron (ZOFRAN ODT) 4 MG ODT tab    propranolol ER (INDERAL LA) 80 MG 24 hr capsule    ubrogepant (UBRELVY) 100 MG tablet    venlafaxine (EFFEXOR XR) 75 MG 24 hr capsule    vitamin B-2 (RIBOFLAVIN) 25 MG TABS tablet    ZOLMitriptan (ZOMIG) 5 MG nasal spray     No current facility-administered medications for this visit.     Facility-Administered Medications Ordered in Other Visits   Medication    heparin 100 unit/mL injection 5 mL    sodium chloride (PF) 0.9% PF flush 10 mL       Allergies   Allergen Reactions    Bactrim [Sulfamethoxazole-Trimethoprim] Other (See Comments)     Internal bleeding    Copaxone [Glatiramer] Hives       Social History     Socioeconomic History    Marital status:      Spouse name: Kash    Number of children: 3    Years of education: None    Highest education level: None   Occupational History     Employer: [email or web address]   Tobacco Use    Smoking status: Former     Types: Cigarettes     Quit date: 2005     Years since quittin.3     Passive exposure: Never (per pt)    Smokeless tobacco: Never   Vaping Use     Vaping Use: Never used   Substance and Sexual Activity    Alcohol use: Not Currently     Alcohol/week: 2.0 standard drinks of alcohol     Types: 1 Glasses of wine, 1 Cans of beer per week    Drug use: No    Sexual activity: Yes     Partners: Male     Birth control/protection: None     Comment: not needed after chemo     Social Determinants of Health     Financial Resource Strain: High Risk (2024)    Financial Resource Strain     Within the past 12 months, have you or your family members you live with been unable to get utilities (heat, electricity) when it was really needed?: Yes   Food Insecurity: Low Risk  (2024)    Food Insecurity     Within the past 12 months, did you worry that your food would run out before you got money to buy more?: No     Within the past 12 months, did the food you bought just not last and you didn t have money to get more?: No   Transportation Needs: Low Risk  (2024)    Transportation Needs     Within the past 12 months, has lack of transportation kept you from medical appointments, getting your medicines, non-medical meetings or appointments, work, or from getting things that you need?: No   Housing Stability: Low Risk  (2024)    Housing Stability     Do you have housing? : Yes     Are you worried about losing your housing?: No       Family History   Problem Relation Age of Onset    Breast Cancer Maternal Aunt 50         at 85    Breast Cancer Cousin 40    Breast Cancer Mother 49        2nd primary, contralateral breast at 69;  at 86    Melanoma Mother     Breast Cancer Maternal Grandmother 40    Ovarian Cancer Maternal Grandmother     Breast Cancer Paternal Grandmother 50         at 60    Brain Cancer Cousin 20    Breast Cancer Paternal Aunt 50         at 60    Breast Cancer Cousin 45        paternal cousin    Anesthesia Reaction No family hx of     Deep Vein Thrombosis No family hx of        ROS: 10 point ROS neg other than the symptoms noted above  in the HPI.    Vital Signs: /80   Pulse 67   Temp 97.4  F (36.3  C) (Temporal)   Resp 16   Wt 112 lb (50.8 kg)   SpO2 99%   BMI 20.48 kg/m      Examination:  Constitutional:  Alert, well nourished, NAD.  HEENT: Normocephalic, atraumatic.   Pulm:  Without shortness of breath or audible respiratory sounds.  CV:  No pitting edema of BLE.      Neurological:  Awake  Alert  Oriented x 3  Speech clear  Cranial nerves II - XII intact  PERRL  EOMI  Face symmetric  Tongue midline  Motor exam: 5/5 strength in all four extremities, moves all extremities  equally   Sensation intact   Finger to Nose smooth   Pronator drift negative   Gait: Able to stand from a seated position. Normal non-antalgic, non-myelopathic gait.    Imaging:   CT OF THE HEAD WITHOUT CONTRAST 3/29/2024 10:59 AM                                                  IMPRESSION: Diffuse cerebral volume loss and cerebral white matter  changes consistent with chronic small vessel ischemic disease. No  evidence for acute intracranial pathology. This represents interval  resolution of the small volume right occipital subarachnoid hemorrhage  since the comparison study.    Assessment/Plan:   Subarachnoid hemorrhage   Repeat head CT on 3/29 shows interval resolution of small volume right occipital subarachnoid hemorrhage, no evidence for acute intracranial  pathology. Patient plans to follow-up with PCP for Eliquis management. Advised patient to follow-up with our clinic as needed.     Seizures   Patient will follow-up with her Neurology clinic for seizure  management. Advised patient to continue with Keppra as prescribed.     Discussed red flag symptoms and advised to seek medical attention with any increased headaches, dizziness, nausea/vomiting, vision/speech changes, weakness, confusion, seizure activity, or other neurological changes. Patient voiced understanding and agreement.      Jes Kang CNP  [facility]  Tel  [phone]  Pager [phone]

## 2024-04-01 NOTE — Clinical Note
4/1/2024         RE: Shaneka Alvarez  [address] [facility] [address]        Dear Colleague,    Thank you for referring your patient, Shaneka Alvarez, to the [facility]. Please see a copy of my visit note below.    Oncology Follow Up Visit: April 1, 2024    Oncologist: Dr. Dewayne Zepeda  PCP: [facility], [facility]    Reason for Visit: Follow-up prior to treatment     Diagnosis:   Metastatic breast cancer negative breast cancer (androgen receptor positive ) with extensive involvement of the bones.  There is also a left lower lobe hypermetabolic lesion and mediastinal lymph nodes which are hypermetabolic.  The biopsy from the right iliac bone is consistent with metastatic lobular breast cancer but it is hormone receptor and HER-2 negative and PDL 1 is also negative. Foundation 1 testing did not reveal any actionable mutations.    She was intolerant to Xeloda and progressed on Taxol and eribulin.  She was switched to FDA approved sacituzumab govitecan-hziy (Trodelvy) for TNBC, based on the results of the phase II IMMU-132-01 clinical trial. She started this on 11/11/2020. After 10 cycles of Trodelvy, she had PET/CT on 6/11/2021 which showed mildly increased uptake in some of the bone lesions while stability in other bone lesions. After 15 cycles, repeat PET scan showed stable findings.  CA 27-29 has been increasing and it is 1176.       Then she had clear progression of the disease in the bones with increased FDG uptake in both right and left iliac bones and the sternum.    Foundation one showed CDH1 mutation (initially lobular cancer), CCND1 amplification ( equivocal ). FGF3, FGF4, FGF19 amplification ( Equivocal ). Most promising is CCND1 amplification with abemaciclib showing response in 4/10 patients. FGF3,FGF4 and FGF19 are targetable with pan-FGFR inhibitor. As the tumor is diffusely positive for androgen receptor, we decided to start her on androgen receptor  blockers.     Started on Casodex 150 mg daily instead of Enzalutamide due to cost issues on 1/6/2022.   She had progressive disease in the bones on the PET scan on 3/31/2022 - changed to single agent Doxil on 4/14/2022.  20% dose-reduced 5/13/2022 for significant nausea and vomiting.       Repeat PET/CT on 7/1/2022 shows resolution of the FDG avid bony metastasis consistent with treated disease.  No suspicious FDG uptake was seen in the chest abdomen and pelvis. CA 27-29 was also coming down.     CA 15-3 has been slowly rising. Repeat PET/CT on 8/8/2023 showed pretty stable findings. She has been having more pain in the lumbar spine/low back. As the progression has been very slow and mild and overall she has been tolerating chemotherapy well, decision made to continue Doxil with Onpro.      Interval History:  ***    Patient denies any of the following except if noted above: fevers, chills, difficulty with energy, vision or hearing changes, chest pain, dyspnea, abdominal pain, nausea, vomiting, diarrhea, constipation, urinary concerns, headaches, numbness, tingling, issues with sleep or mood. She also denies lumps, bumps, rashes or skin lesions, bleeding or bruising issues.    Physical Exam:  There were no vitals taken for this visit.   BP Readings from Last 6 Encounters:   04/01/24 120/80   03/15/24 (!) 152/99   03/13/24 128/88   03/06/24 (!) 166/114   02/07/24 (!) 158/99   01/10/24 (!) 152/98     Wt Readings from Last 6 Encounters:   04/01/24 50.8 kg (112 lb)   03/15/24 48.4 kg (106 lb 9.6 oz)   03/06/24 51.7 kg (114 lb)   03/05/24 49.9 kg (110 lb)   03/01/24 50.8 kg (112 lb)   02/07/24 54 kg (119 lb)        Constitutional: No acute distress, pleasant, appropriately groomed.   ENT: PERRLA, sclera without erythema. Appropriate dentition, no mouth sores.  Neck: Trachea midline, no adenopathy.   Resp: CTA, adequate depth and rate of respirations.   Cardiac: S1/S2, RRR, no murmurs.   Abdomen: BS active, abdomen soft  and non-tender. No masses or organomegaly.   MS:  5/5 muscle strength, adequate ROM.   Skin: No rashes, lesions, or wounds on exposed skin.  Neuro: A/O x 4, sensation intact.   Lymph: No palpable anterior/posterior cervical, axillary, or supraclavicular nodes.   Psych: Appropriate mentation and affect.    Laboratory Results:   Results for orders placed or performed in visit on 04/01/24   CBC with platelets differential     Status: None ()    Narrative    The following orders were created for panel order CBC with platelets differential.  Procedure                               Abnormality         Status                     ---------                               -----------         ------                     CBC with platelets and d...[[id number]]                                                   Please view results for these tests on the individual orders.     I reviewed the above labs today.    Imaging:  CT Head w/o contrast*  Narrative: CT OF THE HEAD WITHOUT CONTRAST 3/29/2024 10:59 AM     COMPARISON: Brain MRI 3/13/2024.    HISTORY: SAH; Subarachnoid hemorrhage (H).    TECHNIQUE: 5 mm thick axial CT images of the head were acquired  without IV contrast material.    FINDINGS: There is mild diffuse cerebral volume loss. There are subtle  patchy areas of decreased density in the cerebral white matter  bilaterally that are consistent with sequela of chronic small vessel  ischemic disease.    The ventricles and basal cisterns are within normal limits in  configuration given the degree of cerebral volume loss.  There is no  midline shift. There are no extra-axial fluid collections.    No intracranial hemorrhage, mass or recent infarct.    The visualized paranasal sinuses are well-aerated. There is no  mastoiditis. There are no fractures of the visualized bones.  Impression: IMPRESSION: Diffuse cerebral volume loss and cerebral white matter  changes consistent with chronic small vessel ischemic disease. No  evidence for  acute intracranial pathology. This represents interval  resolution of the small volume right occipital subarachnoid hemorrhage  since the comparison study.    Radiation dose for this scan was reduced using automated exposure  control, adjustment of the mA and/or kV according to patient size, or  iterative reconstruction technique    LAMONT JACKSON MD         SYSTEM ID:  [id number]    I reviewed the above imaging report today.        Assessment and Plan:   Metastatic breast cancer   - On treatment with Doxil and continues to tolerate treatment well   - Slow and mild progression based on PET/CT imaging on 3/1 and rising CA 15-3    - Considering chemotherapy with carboplatin or gemcitabine in the future  - Due for C26 today, exam and labs reviewed - continues to meet treatment parameters, can proceed with treatment    New onset seizure  - Admitted from 3/13/24 to 3/15/24 after witnessed seizure  - CT w/ concern for hemorrhages and subsequent brain MRI w/trace right parietal-occipital subarachnoid hemorrhage, known osseous metastasis, no intracranial/parenchymal metastasis   - Now on Keppra 500 mg BID  - Follow up CT on 3/29 with interval resolution of SAH  - Neurology following     Chemo-induced neutropenia  - Continue Onpro support  - ANC ***   - Continue to monitor     Mild anemia  - s/t chemotherapy  - Mild, Hgb ***  - Continue to monitor     Metastatic bone disease  - Received palliative radiation to T12 - L5 3000 cGy in 10 fractions in 2019  - Switched to Xgeva in 2020, due for dose today    Cancer-related pain   - Palliative care following  - Using buprenorphine buccal film to 600 mcg in the morning and 450 mcg in the evening   - Morphine PRN, using occasionally     Bilateral PE  - On Eliquis BID - held recently d/t new onset seizure and SAH, will follow-up with PCP regarding when to resume medication  - No s/s of bleeding         Jeannie Mixon - GUTIERREZ, CNP      Again, thank you for allowing me to participate in  the care of your patient.        Sincerely,        GUTIERREZ Luther CNP

## 2024-04-01 NOTE — LETTER
4/1/2024         RE: Shaneka Alvarez  [address] [facility] [address]        Dear Colleague,    Thank you for referring your patient, Shaneka Alvarez, to the [facility]UROS[facility] [place]. Please see a copy of my visit note below.    [facility] Neurosurgery Clinic   Follow Up Visit      HPI: Shaneka Alvarez is a 63 year old female with history of metastatic  breast cancer, MS, prior  PE on Eliquis, who presents for hospital follow-up of subarachnoid hemorrhage. Patient was admitted on 3/13/24 with seizure activity and trace right parieto-occipital subarachnoid hemorrhage. Neurosurgery recommended to hold Eliquis, follow-up in clinic with repeat head CT, and follow-up with Neurology for seizure and Keppra management.     Today, patient reports feeling well. Denies any headaches, dizziness, nausea, vision/speech changes, or other neurological changes. Denies any new symptoms or concerns at this time. She follows with Neurology for chronic migraines but has not yet scheduled a follow-up appt for seizure management. She continues to take Keppra as prescribed.     Past Medical History:   Diagnosis Date     Breast cancer (H)     Age 44     Cataract     left eye     Chemotherapy induced nausea and vomiting 12/15/2020     Common migraine      Esophageal reflux      H/O bilateral mastectomy      Mild major depression (H24)      Multiple sclerosis (H)      Pulmonary embolism (H)          Past Surgical History:   Procedure Laterality Date     CHOLECYSTECTOMY       ENDOBRONCHIAL ULTRASOUND FLEXIBLE N/A 09/05/2019    Procedure: Flexible Bronchoscopy, Endobronchial Ultrasound, Radial Probe;  Surgeon: Sree Sanchez MD;  Location: [facility] OR     HC REMOVAL OF OVARY/TUBE(S)       HERNIA REPAIR, UMBILICAL       INJECT SACROILIAC JOINT Bilateral 10/13/2023    Procedure: Sacroiliac Joint Injection;  Surgeon: Natalie Yusuf MD;  Location: [facility] OR     mastectomy  Bilateral 2006      MASTECTOMY, BILATERAL       PHACOEMULSIFICATION CLEAR CORNEA WITH TORIC INTRAOCULAR LENS IMPLANT Left 2/8/2021    Procedure: PHACOEMULSIFICATION, COMPLEX CATARACT, WITH INTRAOCULAR LENS IMPLANT, RESIDENT TORIC LENS LEFT;  Surgeon: Luis Barkley MD;  [place] [facility] [place]     PHACOEMULSIFICATION CLEAR CORNEA WITH TORIC INTRAOCULAR LENS IMPLANT Right 3/8/2021    Procedure: PHACOEMULSIFICATION, CATARACT, WITH INTRAOCULAR LENS IMPLANT, TORIC LENS RIGHT;  Surgeon: Luis Barkley MD;  [place] [facility] [place]       Current Outpatient Medications   Medication     apixaban ANTICOAGULANT (ELIQUIS ANTICOAGULANT) 5 MG tablet     Buprenorphine HCl (BELBUCA) 450 MCG FILM buccal film     Buprenorphine HCl (BELBUCA) 600 MCG FILM buccal film     busPIRone (BUSPAR) 15 MG tablet     calcium citrate-vitamin D (CITRACAL) 315-250 MG-UNIT TABS     Cholecalciferol (VITAMIN D3 PO)     doxepin (SINEQUAN) 10 MG/ML (HIGH CONC) solution     galcanezumab-gnlm (EMGALITY) 120 MG/ML injection     levETIRAcetam (KEPPRA) 500 MG tablet     LORazepam (ATIVAN) 0.5 MG tablet     metoclopramide (REGLAN) 5 MG tablet     morphine (MSIR) 15 MG IR tablet     OLANZapine (ZYPREXA) 5 MG tablet     ondansetron (ZOFRAN ODT) 4 MG ODT tab     propranolol ER (INDERAL LA) 80 MG 24 hr capsule     ubrogepant (UBRELVY) 100 MG tablet     venlafaxine (EFFEXOR XR) 75 MG 24 hr capsule     vitamin B-2 (RIBOFLAVIN) 25 MG TABS tablet     ZOLMitriptan (ZOMIG) 5 MG nasal spray     No current facility-administered medications for this visit.     Facility-Administered Medications Ordered in Other Visits   Medication     heparin 100 unit/mL injection 5 mL     sodium chloride (PF) 0.9% PF flush 10 mL       Allergies   Allergen Reactions     Bactrim [Sulfamethoxazole-Trimethoprim] Other (See Comments)     Internal bleeding     Copaxone [Glatiramer] Hives       Social History     Socioeconomic History     Marital status:      Spouse name: Kash     Number of children: 3      Years of education: None     Highest education level: None   Occupational History     Employer: [employer]   Tobacco Use     Smoking status: Former     Types: Cigarettes     Quit date: 2005     Years since quittin.3     Passive exposure: Never (per pt)     Smokeless tobacco: Never   Vaping Use     Vaping Use: Never used   Substance and Sexual Activity     Alcohol use: Not Currently     Alcohol/week: 2.0 standard drinks of alcohol     Types: 1 Glasses of wine, 1 Cans of beer per week     Drug use: No     Sexual activity: Yes     Partners: Male     Birth control/protection: None     Comment: not needed after chemo     Social Determinants of Health     Financial Resource Strain: High Risk (2024)    Financial Resource Strain      Within the past 12 months, have you or your family members you live with been unable to get utilities (heat, electricity) when it was really needed?: Yes   Food Insecurity: Low Risk  (2024)    Food Insecurity      Within the past 12 months, did you worry that your food would run out before you got money to buy more?: No      Within the past 12 months, did the food you bought just not last and you didn t have money to get more?: No   Transportation Needs: Low Risk  (2024)    Transportation Needs      Within the past 12 months, has lack of transportation kept you from medical appointments, getting your medicines, non-medical meetings or appointments, work, or from getting things that you need?: No   Housing Stability: Low Risk  (2024)    Housing Stability      Do you have housing? : Yes      Are you worried about losing your housing?: No       Family History   Problem Relation Age of Onset     Breast Cancer Maternal Aunt 50         at 85     Breast Cancer Cousin 40     Breast Cancer Mother 49        2nd primary, contralateral breast at 69;  at 86     Melanoma Mother      Breast Cancer Maternal Grandmother 40     Ovarian Cancer Maternal Grandmother       Breast Cancer Paternal Grandmother 50         at 60     Brain Cancer Cousin 20     Breast Cancer Paternal Aunt 50         at 60     Breast Cancer Cousin 45        paternal cousin     Anesthesia Reaction No family hx of      Deep Vein Thrombosis No family hx of        ROS: 10 point ROS neg other than the symptoms noted above in the HPI.    Vital Signs: /80   Pulse 67   Temp 97.4  F (36.3  C) (Temporal)   Resp 16   Wt 112 lb (50.8 kg)   SpO2 99%   BMI 20.48 kg/m      Examination:  Constitutional:  Alert, well nourished, NAD.  HEENT: Normocephalic, atraumatic.   Pulm:  Without shortness of breath or audible respiratory sounds.  CV:  No pitting edema of BLE.      Neurological:  Awake  Alert  Oriented x 3  Speech clear  Cranial nerves II - XII intact  PERRL  EOMI  Face symmetric  Tongue midline  Motor exam: 5/5 strength in all four extremities, moves all extremities  equally   Sensation intact   Finger to Nose smooth   Pronator drift negative   Gait: Able to stand from a seated position. Normal non-antalgic, non-myelopathic gait.    Imaging:   CT OF THE HEAD WITHOUT CONTRAST 3/29/2024 10:59 AM                                                  IMPRESSION: Diffuse cerebral volume loss and cerebral white matter  changes consistent with chronic small vessel ischemic disease. No  evidence for acute intracranial pathology. This represents interval  resolution of the small volume right occipital subarachnoid hemorrhage  since the comparison study.    Assessment/Plan:   Subarachnoid hemorrhage   Repeat head CT on 3/29 shows interval resolution of small volume right occipital subarachnoid hemorrhage, no evidence for acute intracranial  pathology. Patient plans to follow-up with PCP for Eliquis management. Advised patient to follow-up with our clinic as needed.     Seizures   Patient will follow-up with her Neurology clinic for seizure  management. Advised patient to continue with Keppra as prescribed.      Discussed red flag symptoms and advised to seek medical attention with any increased headaches, dizziness, nausea/vomiting, vision/speech changes, weakness, confusion, seizure activity, or other neurological changes. Patient voiced understanding and agreement.      Jes Kang CNP  [facility] Neurosurgery  Tel [phone]  Pager [phone]      Again, thank you for allowing me to participate in the care of your patient.        Sincerely,        Jes Kang, NP

## 2024-04-04 NOTE — TELEPHONE ENCOUNTER
[facility] Call Center    Phone Message    May a detailed message be left on voicemail: yes     Reason for Call: Medication Refill Request    Has the patient contacted the pharmacy for the refill? Yes   Name of medication being requested: ZOLMitriptan (ZOMIG) 5 MG nasal spray    Pharmacist is asking to substitute  medication to: Sumatriptan as they are out of stock of ZOLMitriptan prescribed medication and they do not know when next supply for this med will come in. Please contact Pharmacy and advise.     Provider who prescribed the medication: Emelia Mixon APRN CNP      Pharmacy: [facility]   [address] [facility]   Phone: [phone]    Date medication is needed: 04/04/2024        Action Taken: Other: Neurology     Travel Screening: Not Applicable

## 2024-04-04 NOTE — TELEPHONE ENCOUNTER
"Prior Authorization Retail Medication Request    Medication/Dose: ZOLMitriptan (ZOMIG) 5 MG nasal spray  Diagnosis and ICD code (if different than what is on RX):    New/renewal/insurance change PA/secondary ins. PA:  Previously Tried and Failed:    Tylenol  Ketoralac  Naratriptan  Zavspret  Ubrelvy    Rationale:  acute migraine, per pt \"Zolmitriptan has been the most effective abortive migraine medication\"    Insurance   Primary: [employer]  Insurance ID:  [id number]    Pharmacy Information (if different than what is on RX)  Name:    Phone:    Fax:    "

## 2024-04-05 NOTE — TELEPHONE ENCOUNTER
"Ok with sumatriptan but per my note she told me \"Sumatriptan nasal and injections and all of the triptans (Maxalt, Relpax) were not effective.\" Is she opened to retry it and what form-nasal sumatriptan?   "

## 2024-04-05 NOTE — TELEPHONE ENCOUNTER
Spoke to patient about this. She has checked other pharmacies, but none have zolmitriptan in stock. We are waiting for prior auth for zolmitriptan anyways.    Pt OK with trying sumatriptan as an alternative while waiting for PA and Zolmitriptan to be in stock.    Routing to [facility] for review and see if OK to use sumatriptan as alternative.

## 2024-04-16 NOTE — TELEPHONE ENCOUNTER
Retail Pharmacy Prior Authorization Team   Phone: [phone]    PA Initiation    Medication: ZOLMITRIPTAN 5 MG NA SOLN  Insurance Company: [employer] ([facility]) - Phone [phone] Fax [phone]  Pharmacy Filling the Rx: [facility] [address] [place], [place] - [address] [facility]  Filling Pharmacy Phone: [phone]  Filling Pharmacy Fax:    Start Date: 4/16/2024

## 2024-04-17 NOTE — TELEPHONE ENCOUNTER
Prior Authorization Not Needed per Insurance    Medication: ZOLMITRIPTAN 5 MG NA SOLN  Insurance Company: [employer] ([facility]) - Phone [phone] Fax [phone]  Expected CoPay: $    Pharmacy Filling the Rx: [facility] [address] [place] [address] [facility]  Pharmacy Notified: Yes  Patient Notified: Per pharmacy, patient picked on 4/5/24    Drug is on formulary per insurance. Pharmacy stated they were able to process medication on 4/5/24 and patient did pick it up. They only processed for #6 per 30 days as the max amount covered per month. Does patient need #12 per month? May need to do PA for quantity limit if that is what patient needs.

## 2024-04-23 NOTE — TELEPHONE ENCOUNTER
Received [employer] message from patient requesting refill of Belbuca 600 mcg and 450 mcg.     Last refill: Belbuca 600 mcg 3/27/2024  Last refill Belbuca 450 mcg 3/27/2024  Last office visit: 3/6/2024  Scheduled for follow up 6/5/2024     Will route request to MD for review.     Reviewed [place]  Report.

## 2024-05-01 ENCOUNTER — TELEPHONE (OUTPATIENT)
Dept: FAMILY MEDICINE | Facility: OTHER | Age: 64
End: 2024-05-01
Payer: COMMERCIAL

## 2024-05-01 NOTE — TELEPHONE ENCOUNTER
INCOMING FORMS    Sender: [place] medical examiner    Type of Form, letter or note (What is requested?): notice of death certificate responsibility     How was the form received?: Fax    How should forms be returned?:  N/A    Form placed in [facility] for review/signature if   appropriate.       2 forms were received. Both from [facility]. One states  home is [place] and other says [email or web address].  ([facility])

## 2024-05-02 NOTE — TELEPHONE ENCOUNTER
Filed cause of death on state site. If [employer] calls back, inform them this has been done. Nothing else needed. Sent to scanning.

## 2024-06-01 NOTE — TELEPHONE ENCOUNTER
Central Prior Authorization Team   Phone: [phone]      PA Initiation    Medication: Aimovig 70 mg every 30 days  Insurance Company:    Pharmacy Filling the Rx: [facility] [address] [facility] [address] [facility]  Filling Pharmacy Phone: [phone]  Filling Pharmacy Fax:    Start Date: 5/24/2022        
Prior Authorization Approval    Authorization Effective Date: 5/24/2022  Authorization Expiration Date:    Medication: Aimovig 70 mg every 30 days-APPROVED  Approved Dose/Quantity:   Reference #:     Insurance Company:    Expected CoPay:       CoPay Card Available:      Foundation Assistance Needed:    Which Pharmacy is filling the prescription (Not needed for infusion/clinic administered): [facility] [address] [place], [place] - [address] [facility]  Pharmacy Notified: Yes  Patient Notified: No      
Prior Authorization Retail Medication Request    Medication/Dose: Aimovig 70 mg every 30 days  ICD code (if different than what is on RX):    [id number]                                                                   Pharmacy Information (if different than what is on RX)  Name:    Phone:    
Home

## 2025-02-28 NOTE — PROGRESS NOTES
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  TLSO bracing and strict bedrest.    - dexamethasone 10 mg IV once today. If effective, can start 4 mg bid for a few days, then taper to 4 mg daily   - encourage oxycodone over IV morphine   - lidocaine patch, diclofenac gel   - bowel regimen      ## Recurrent breast cancer vs. New primary  - history of ER+IA+[id number] T2 N1 M0 breast cancer s/p chemo and radiation in [id number], s/p R mod radical mastectomy and L prophylactic mastectomy in 2016.  Finished 7 years of exemastone until 2014.   - Await PET result for suitable biopsy target.      ## subacute productive cough without fevers, chills, night sweats  ## left lung nodule with GGO  Not coughing at present time but will order sputum cultures in case she brings up anything. In absence of any additional symptoms, signs, or labs concerning for infection will not do a full infection workup as of yet.  sputum culture & gram stain ordered     --- CHRONIC ISSUES ---  ## MS with right UE and LE weakness  ## Migraine  In particular weak in right lower leg with weakness of hip flexion, ankle dorsiflexion, and toe dorsiflexion without actual evidence of foot drop. Recent  Name: Brent Hall      : 1962      MRN: [id number]  Encounter Provider: Azam Carreon MD  Encounter Date: 2025   Encounter department: [facility]  :  Assessment & Plan  Malignant neoplasm of ascending colon (HCC)  1. Malignant neoplasm of ascending colon (HCC)  Assessment & Plan:  Brent returns today, he is ~8months out from robotic assisted right hemicolectomy,24.      A. Terminal ileum, appendix,  right colon (right hemicolectomy):  - Adenocarcinoma with mucinous features (4.0cm)  - Lymph-vascular and perineural invasion present  - Metastatic carcinoma involves one (1) of twenty-six (26) lymph nodes ()  - Margins negative for carcinoma   - See staging synoptic (pT3N1a)     He was discussed at tumor conference, recommendation was made for adjuvant chemotherapy,saw 2nd opinion with Dr. Leyva as he refused chemotherapy which did not change, ctDNA surveillance was ordered instead.     -He states last CT DNA was not drawn due to Pretty, he is trying to set this up again.  -He should continue with surveillance with hematology/oncology due to the above, lab/imaging as well.  -He has colonoscopy scheduled in April with Dr. Ortega  -Will see him in surgical office visit follow-up in 9 months  CC:  MD Dr. Jordan Estevez Dr., RN                  History of Present Illness   HPI    Brent Hall is a 62 y.o. male who presents for a follow up for stage IIIB adenocarcinoma of the ascending colon, pT3N1a . He was last seen in the office on 24.    The patient is status post diagnostic laparoscopy, robotic right hemicolectomy, ileocolic, ileum to mid transverse colon, side-to-side, functional end-to-end stapled anastomosis, intraoperative fluorescence angiography on 24.    The patient denied adjuvant therapy.     He was last seen by Dr. Leyva (Hematology/Oncology) on 24.    Signatera residual disease  "test (MRD) 8/23/24 = Signatera Negative, MTM/mL: Not Detected.    Last CT chest abdomen pelvis with contrast 5/14/24.    He has daily bowel movements with soft and formed stool. He denies rectal bleeding and blood in the stool.     Last colonoscopy was performed on 04/16/2024 by Dr. Demetrius Ortega with a 1 year recall.    Lab Results   Component Value Date    CEA 4.8 (H) 04/27/2024     Lab Results   Component Value Date    IRON 38 (L) 10/17/2024    TIBC 467 (H) 10/17/2024    FERRITIN 10 (L) 10/17/2024     Lab Results   Component Value Date    WBC 5.03 01/14/2025    HGB 13.2 01/14/2025    HCT 41.1 01/14/2025    MCV 79 (L) 01/14/2025     01/14/2025     Lab Results   Component Value Date    SODIUM 139 01/14/2025    K 4.7 01/14/2025     01/14/2025    CO2 28 01/14/2025    AGAP 5 01/14/2025    BUN 17 01/14/2025    CREATININE 1.08 01/14/2025    GLUF 169 (H) 01/14/2025    CALCIUM 9.5 01/14/2025    AST 11 (L) 10/26/2024    ALT 15 10/26/2024    ALKPHOS 58 10/26/2024    TP 7.5 10/26/2024    TBILI 0.67 10/26/2024    EGFR 74 01/14/2025     Review of Systems    Objective   /84   Ht 5' 10\" (1.778 m)   Wt 98.9 kg (218 lb)   BMI 31.28 kg/m²      Physical Exam  HENT:      Head: Normocephalic.   Eyes:      Pupils: Pupils are equal, round, and reactive to light.   Cardiovascular:      Rate and Rhythm: Normal rate.   Pulmonary:      Effort: Pulmonary effort is normal.   Abdominal:      General: There is no distension.      Palpations: Abdomen is soft.      Tenderness: There is no abdominal tenderness.      Hernia: No hernia is present.   Musculoskeletal:      Right lower leg: No edema.   Skin:     General: Skin is warm and dry.   Neurological:      Mental Status: He is alert.     " Brain imaging without evidence of new MS lesions.  -- propranolol and continued for migraine but aimovig, magnesium held for now  -- Not on any controller med for MS.   ## overactive bladder. Continue tolterodine  ## depression. Continue venlafaxine, buspar     FEN: regular diet  Prophylaxis: mechanical and ambulate in case she needs procedure.  Consider pharmacolgic DVT ppx soon.   Code: FULL  Disposition: 1-2 days based on pain control and PET/CT read and biopsy in this stay vs. Outpatient.    The patient's care was discussed with the Attending Physician, Dr. Mejia.    Gabe Lewis DO, MS [facility]  [phone]  ______________________________________________________________________    Interval History   Complains of severe back and rib pain.  Worsening over the last few weeks but going on for over a year.  No chest pain, sob, abd pain.     Data reviewed today: I reviewed all medications, new labs and imaging results over the last 24 hours.    Physical Exam   Vital Signs: Temp: 97.2  F (36.2  C) Temp src: Oral BP: (!) 152/89 Pulse: 84   Resp: 18 SpO2: 95 % O2 Device: None (Room air)    Weight: 164 lbs 6.4 oz  General Appearance: Uncomfortable if moves  Respiratory: CTAB on RA  Cardiovascular: RRR  GI: Soft, NTND  Skin: No rash  Other: Moving all ext, not weakness or sensory deficit    Data   24 hour data reviewed

## (undated) DEVICE — SYR 10ML SLIP TIP W/O NDL 303134

## (undated) DEVICE — EYE KNIFE SLIT XSTAR VISITEC 2.6MM 45DEG 373726

## (undated) DEVICE — DRSG KERLIX 4 1/2"X4YDS ROLL 6715

## (undated) DEVICE — ENDO ADPT BRONCH SWIVEL Y A1002

## (undated) DEVICE — LINEN TOWEL PACK X5 5464

## (undated) DEVICE — NDL 21GA 1.5"

## (undated) DEVICE — SYR 10ML LL W/O NDL 302995

## (undated) DEVICE — GLOVE PROTEXIS MICRO 7.5  2D73PM75

## (undated) DEVICE — LUBRICANT INST KIT ENDO-LUBE 220-90

## (undated) DEVICE — ANTIFOG SOLUTION W/FOAM PAD 31142527

## (undated) DEVICE — EYE CANN IRR 27GA ANTERIOR CHAMBER 581280

## (undated) DEVICE — PACK CATARACT CUSTOM ASC SEY15CPUMC

## (undated) DEVICE — EYE KNIFE STILETTO VISITEC 1.1MM ANG 45DEG SIDEPORT 376620

## (undated) DEVICE — SUCTION MANIFOLD DORNOCH ULTRA CART UL-CL500

## (undated) DEVICE — SOL NACL 0.9% IRRIG 1000ML BOTTLE 2F7124

## (undated) DEVICE — ENDO TOOTH GUARD SAC2001

## (undated) DEVICE — EYE TIP IRRIGATION & ASPIRATION POLYMER CVD 0.3MM 8065751512

## (undated) DEVICE — SOL WATER IRRIG 500ML BOTTLE 2F7113

## (undated) DEVICE — ENDO FORCEP ALLIGATOR JAW BIOPSY 2MMX100CM FB-211D

## (undated) DEVICE — EYE SHIELD PLASTIC

## (undated) DEVICE — EYE CANN IRR 25GA CYSTOTOME 581610

## (undated) DEVICE — EYE RING MALYUGIN PUPIL EXPANDER 6.25MM MAL-000-1

## (undated) DEVICE — ENDO VALVE SYR NDL KIT ULTRASOUND BRONCH NA-201SX-4022-A

## (undated) DEVICE — PREP CHLORAPREP W/ORANGE TINT 10.5ML 260715

## (undated) DEVICE — GLOVE PROTEXIS MICRO 6.0  2D73PM60

## (undated) DEVICE — ENDO VALVE SUCTION BRONCH EVIS MAJ-209

## (undated) DEVICE — SYR 30ML SLIP TIP W/O NDL 302833

## (undated) DEVICE — EYE PACK CUSTOM ANTERIOR 30DEG TIP CENTURION PPK6682-04

## (undated) DEVICE — KIT ENDO FIRST STEP DISINFECTANT 200ML W/POUCH EP-4

## (undated) DEVICE — ENDO VALVE BX EVIS MAJ-210

## (undated) DEVICE — ENDO VALVE SUCTION ULTRASOUND BRONCH MAJ-1414

## (undated) DEVICE — TRAY PAIN INJECTION 97A 640

## (undated) DEVICE — TUBING SUCTION 10'X3/16" N510

## (undated) DEVICE — SPONGE RAY-TEC 4X8" 7318

## (undated) DEVICE — NDL SPINAL 22GA 3.5" QUINCKE 405181

## (undated) DEVICE — SOL WATER 10ML VIAL 6332318510

## (undated) RX ORDER — HEPARIN SODIUM (PORCINE) LOCK FLUSH IV SOLN 100 UNIT/ML 100 UNIT/ML
SOLUTION INTRAVENOUS
Status: DISPENSED
Start: 2021-02-25

## (undated) RX ORDER — HEPARIN SODIUM (PORCINE) LOCK FLUSH IV SOLN 100 UNIT/ML 100 UNIT/ML
SOLUTION INTRAVENOUS
Status: DISPENSED
Start: 2021-02-08

## (undated) RX ORDER — PROPOFOL 10 MG/ML
INJECTION, EMULSION INTRAVENOUS
Status: DISPENSED
Start: 2019-09-05

## (undated) RX ORDER — ONDANSETRON 2 MG/ML
INJECTION INTRAMUSCULAR; INTRAVENOUS
Status: DISPENSED
Start: 2019-09-04

## (undated) RX ORDER — FENTANYL CITRATE 50 UG/ML
INJECTION, SOLUTION INTRAMUSCULAR; INTRAVENOUS
Status: DISPENSED
Start: 2021-03-08

## (undated) RX ORDER — FENTANYL CITRATE 50 UG/ML
INJECTION, SOLUTION INTRAMUSCULAR; INTRAVENOUS
Status: DISPENSED
Start: 2021-02-08

## (undated) RX ORDER — FENTANYL CITRATE 50 UG/ML
INJECTION, SOLUTION INTRAMUSCULAR; INTRAVENOUS
Status: DISPENSED
Start: 2019-09-04

## (undated) RX ORDER — HEPARIN SODIUM (PORCINE) LOCK FLUSH IV SOLN 100 UNIT/ML 100 UNIT/ML
SOLUTION INTRAVENOUS
Status: DISPENSED
Start: 2021-03-08

## (undated) RX ORDER — PHENYLEPHRINE HCL IN 0.9% NACL 1 MG/10 ML
SYRINGE (ML) INTRAVENOUS
Status: DISPENSED
Start: 2019-09-05

## (undated) RX ORDER — SODIUM CHLORIDE 9 MG/ML
INJECTION, SOLUTION INTRAVENOUS
Status: DISPENSED
Start: 2021-02-25

## (undated) RX ORDER — FENTANYL CITRATE 50 UG/ML
INJECTION, SOLUTION INTRAMUSCULAR; INTRAVENOUS
Status: DISPENSED
Start: 2021-02-25

## (undated) RX ORDER — HEPARIN SODIUM (PORCINE) LOCK FLUSH IV SOLN 100 UNIT/ML 100 UNIT/ML
SOLUTION INTRAVENOUS
Status: DISPENSED
Start: 2018-07-10

## (undated) RX ORDER — KETOROLAC TROMETHAMINE 30 MG/ML
INJECTION, SOLUTION INTRAMUSCULAR; INTRAVENOUS
Status: DISPENSED
Start: 2023-01-01

## (undated) RX ORDER — LIDOCAINE HYDROCHLORIDE 10 MG/ML
INJECTION, SOLUTION EPIDURAL; INFILTRATION; INTRACAUDAL; PERINEURAL
Status: DISPENSED
Start: 2019-09-04

## (undated) RX ORDER — LIDOCAINE HYDROCHLORIDE 20 MG/ML
INJECTION, SOLUTION EPIDURAL; INFILTRATION; INTRACAUDAL; PERINEURAL
Status: DISPENSED
Start: 2019-09-05

## (undated) RX ORDER — DEXAMETHASONE SODIUM PHOSPHATE 4 MG/ML
INJECTION, SOLUTION INTRA-ARTICULAR; INTRALESIONAL; INTRAMUSCULAR; INTRAVENOUS; SOFT TISSUE
Status: DISPENSED
Start: 2019-09-05

## (undated) RX ORDER — KETOROLAC TROMETHAMINE 30 MG/ML
INJECTION, SOLUTION INTRAMUSCULAR; INTRAVENOUS
Status: DISPENSED
Start: 2021-02-25

## (undated) RX ORDER — ACETAMINOPHEN 325 MG/1
TABLET ORAL
Status: DISPENSED
Start: 2021-02-08

## (undated) RX ORDER — ONDANSETRON 2 MG/ML
INJECTION INTRAMUSCULAR; INTRAVENOUS
Status: DISPENSED
Start: 2019-09-05

## (undated) RX ORDER — HYDRALAZINE HYDROCHLORIDE 20 MG/ML
INJECTION INTRAMUSCULAR; INTRAVENOUS
Status: DISPENSED
Start: 2019-09-04

## (undated) RX ORDER — HEPARIN SODIUM (PORCINE) LOCK FLUSH IV SOLN 100 UNIT/ML 100 UNIT/ML
SOLUTION INTRAVENOUS
Status: DISPENSED
Start: 2019-09-04

## (undated) RX ORDER — LABETALOL HYDROCHLORIDE 5 MG/ML
INJECTION, SOLUTION INTRAVENOUS
Status: DISPENSED
Start: 2019-09-05

## (undated) RX ORDER — FENTANYL CITRATE 50 UG/ML
INJECTION, SOLUTION INTRAMUSCULAR; INTRAVENOUS
Status: DISPENSED
Start: 2019-09-05

## (undated) RX ORDER — LIDOCAINE HYDROCHLORIDE 10 MG/ML
INJECTION, SOLUTION EPIDURAL; INFILTRATION; INTRACAUDAL; PERINEURAL
Status: DISPENSED
Start: 2021-02-25